# Patient Record
Sex: MALE | Race: OTHER | NOT HISPANIC OR LATINO | URBAN - METROPOLITAN AREA
[De-identification: names, ages, dates, MRNs, and addresses within clinical notes are randomized per-mention and may not be internally consistent; named-entity substitution may affect disease eponyms.]

---

## 2017-01-16 ENCOUNTER — INPATIENT (INPATIENT)
Facility: HOSPITAL | Age: 82
LOS: 3 days | Discharge: NURSING, REHAB OR LONG-TERM CARE | DRG: 193 | End: 2017-01-20
Attending: INTERNAL MEDICINE | Admitting: INTERNAL MEDICINE
Payer: MEDICARE

## 2017-01-16 VITALS
TEMPERATURE: 98 F | RESPIRATION RATE: 22 BRPM | OXYGEN SATURATION: 90 % | HEART RATE: 57 BPM | SYSTOLIC BLOOD PRESSURE: 172 MMHG | DIASTOLIC BLOOD PRESSURE: 81 MMHG

## 2017-01-16 DIAGNOSIS — I50.9 HEART FAILURE, UNSPECIFIED: ICD-10-CM

## 2017-01-16 DIAGNOSIS — Z98.49 CATARACT EXTRACTION STATUS, UNSPECIFIED EYE: Chronic | ICD-10-CM

## 2017-01-16 LAB
ALBUMIN SERPL ELPH-MCNC: 3.7 G/DL — SIGNIFICANT CHANGE UP (ref 3.3–5)
ALP SERPL-CCNC: 77 U/L — SIGNIFICANT CHANGE UP (ref 40–120)
ALT FLD-CCNC: 15 U/L RC — SIGNIFICANT CHANGE UP (ref 10–45)
ANION GAP SERPL CALC-SCNC: 18 MMOL/L — HIGH (ref 5–17)
APPEARANCE UR: ABNORMAL
APTT BLD: 39.4 SEC — HIGH (ref 27.5–37.4)
AST SERPL-CCNC: 21 U/L — SIGNIFICANT CHANGE UP (ref 10–40)
BACTERIA # UR AUTO: ABNORMAL /HPF
BASE EXCESS BLDV CALC-SCNC: 2.2 MMOL/L — HIGH (ref -2–2)
BASOPHILS # BLD AUTO: 0 K/UL — SIGNIFICANT CHANGE UP (ref 0–0.2)
BASOPHILS NFR BLD AUTO: 0.2 % — SIGNIFICANT CHANGE UP (ref 0–2)
BILIRUB SERPL-MCNC: 1.9 MG/DL — HIGH (ref 0.2–1.2)
BILIRUB UR-MCNC: NEGATIVE — SIGNIFICANT CHANGE UP
BUN SERPL-MCNC: 24 MG/DL — HIGH (ref 7–23)
CA-I SERPL-SCNC: 1.23 MMOL/L — SIGNIFICANT CHANGE UP (ref 1.12–1.3)
CALCIUM SERPL-MCNC: 9.3 MG/DL — SIGNIFICANT CHANGE UP (ref 8.4–10.5)
CHLORIDE BLDV-SCNC: 104 MMOL/L — SIGNIFICANT CHANGE UP (ref 96–108)
CHLORIDE SERPL-SCNC: 103 MMOL/L — SIGNIFICANT CHANGE UP (ref 96–108)
CK MB CFR SERPL CALC: 1.9 NG/ML — SIGNIFICANT CHANGE UP (ref 0–6.7)
CO2 BLDV-SCNC: 27 MMOL/L — SIGNIFICANT CHANGE UP (ref 22–30)
CO2 SERPL-SCNC: 21 MMOL/L — LOW (ref 22–31)
COLOR SPEC: YELLOW — SIGNIFICANT CHANGE UP
CREAT SERPL-MCNC: 1.25 MG/DL — SIGNIFICANT CHANGE UP (ref 0.5–1.3)
DIFF PNL FLD: ABNORMAL
EOSINOPHIL # BLD AUTO: 0 K/UL — SIGNIFICANT CHANGE UP (ref 0–0.5)
EOSINOPHIL NFR BLD AUTO: 0.2 % — SIGNIFICANT CHANGE UP (ref 0–6)
EPI CELLS # UR: SIGNIFICANT CHANGE UP /HPF
GAS PNL BLDV: 140 MMOL/L — SIGNIFICANT CHANGE UP (ref 136–145)
GAS PNL BLDV: SIGNIFICANT CHANGE UP
GAS PNL BLDV: SIGNIFICANT CHANGE UP
GLUCOSE BLDV-MCNC: 221 MG/DL — HIGH (ref 70–99)
GLUCOSE SERPL-MCNC: 207 MG/DL — HIGH (ref 70–99)
GLUCOSE UR QL: 100
HCO3 BLDV-SCNC: 26 MMOL/L — SIGNIFICANT CHANGE UP (ref 21–29)
HCT VFR BLD CALC: 47.6 % — SIGNIFICANT CHANGE UP (ref 39–50)
HCT VFR BLDA CALC: 49 % — SIGNIFICANT CHANGE UP (ref 39–50)
HGB BLD CALC-MCNC: 16 G/DL — SIGNIFICANT CHANGE UP (ref 13–17)
HGB BLD-MCNC: 15.9 G/DL — SIGNIFICANT CHANGE UP (ref 13–17)
INR BLD: 2.56 RATIO — HIGH (ref 0.88–1.16)
KETONES UR-MCNC: ABNORMAL
LACTATE BLDV-MCNC: 2.2 MMOL/L — HIGH (ref 0.7–2)
LEUKOCYTE ESTERASE UR-ACNC: ABNORMAL
LYMPHOCYTES # BLD AUTO: 1 K/UL — SIGNIFICANT CHANGE UP (ref 1–3.3)
LYMPHOCYTES # BLD AUTO: 4.5 % — LOW (ref 13–44)
MCHC RBC-ENTMCNC: 31.9 PG — SIGNIFICANT CHANGE UP (ref 27–34)
MCHC RBC-ENTMCNC: 33.5 GM/DL — SIGNIFICANT CHANGE UP (ref 32–36)
MCV RBC AUTO: 95.3 FL — SIGNIFICANT CHANGE UP (ref 80–100)
MONOCYTES # BLD AUTO: 1.7 K/UL — HIGH (ref 0–0.9)
MONOCYTES NFR BLD AUTO: 8 % — SIGNIFICANT CHANGE UP (ref 2–14)
NEUTROPHILS # BLD AUTO: 18.9 K/UL — HIGH (ref 1.8–7.4)
NEUTROPHILS NFR BLD AUTO: 87.1 % — HIGH (ref 43–77)
NITRITE UR-MCNC: NEGATIVE — SIGNIFICANT CHANGE UP
NT-PROBNP SERPL-SCNC: 1117 PG/ML — HIGH (ref 0–300)
OTHER CELLS CSF MANUAL: 18 ML/DL — SIGNIFICANT CHANGE UP (ref 18–22)
PCO2 BLDV: 40 MMHG — SIGNIFICANT CHANGE UP (ref 35–50)
PH BLDV: 7.43 — SIGNIFICANT CHANGE UP (ref 7.35–7.45)
PH UR: 6 — SIGNIFICANT CHANGE UP (ref 4.8–8)
PLAT MORPH BLD: NORMAL — SIGNIFICANT CHANGE UP
PLATELET # BLD AUTO: 133 K/UL — LOW (ref 150–400)
PO2 BLDV: 46 MMHG — HIGH (ref 25–45)
POTASSIUM BLDV-SCNC: 3.5 MMOL/L — SIGNIFICANT CHANGE UP (ref 3.5–5)
POTASSIUM SERPL-MCNC: 3.8 MMOL/L — SIGNIFICANT CHANGE UP (ref 3.5–5.3)
POTASSIUM SERPL-SCNC: 3.8 MMOL/L — SIGNIFICANT CHANGE UP (ref 3.5–5.3)
PROT SERPL-MCNC: 7 G/DL — SIGNIFICANT CHANGE UP (ref 6–8.3)
PROT UR-MCNC: 100 MG/DL
PROTHROM AB SERPL-ACNC: 28.2 SEC — HIGH (ref 10–13.1)
RAPID RVP RESULT: SIGNIFICANT CHANGE UP
RBC # BLD: 4.99 M/UL — SIGNIFICANT CHANGE UP (ref 4.2–5.8)
RBC # FLD: 12.5 % — SIGNIFICANT CHANGE UP (ref 10.3–14.5)
RBC BLD AUTO: SIGNIFICANT CHANGE UP
RBC CASTS # UR COMP ASSIST: ABNORMAL /HPF (ref 0–2)
SAO2 % BLDV: 81 % — SIGNIFICANT CHANGE UP (ref 67–88)
SODIUM SERPL-SCNC: 142 MMOL/L — SIGNIFICANT CHANGE UP (ref 135–145)
SP GR SPEC: 1.02 — SIGNIFICANT CHANGE UP (ref 1.01–1.02)
TROPONIN T SERPL-MCNC: <0.01 NG/ML — SIGNIFICANT CHANGE UP (ref 0–0.06)
UROBILINOGEN FLD QL: NEGATIVE — SIGNIFICANT CHANGE UP
WBC # BLD: 21.6 K/UL — HIGH (ref 3.8–10.5)
WBC # FLD AUTO: 21.6 K/UL — HIGH (ref 3.8–10.5)
WBC UR QL: ABNORMAL /HPF (ref 0–5)

## 2017-01-16 PROCEDURE — 99285 EMERGENCY DEPT VISIT HI MDM: CPT | Mod: 25

## 2017-01-16 PROCEDURE — 71010: CPT | Mod: 26

## 2017-01-16 PROCEDURE — 93010 ELECTROCARDIOGRAM REPORT: CPT

## 2017-01-16 RX ORDER — ASPIRIN/CALCIUM CARB/MAGNESIUM 324 MG
81 TABLET ORAL DAILY
Qty: 0 | Refills: 0 | Status: DISCONTINUED | OUTPATIENT
Start: 2017-01-16 | End: 2017-01-20

## 2017-01-16 RX ORDER — TAMSULOSIN HYDROCHLORIDE 0.4 MG/1
0.4 CAPSULE ORAL AT BEDTIME
Qty: 0 | Refills: 0 | Status: DISCONTINUED | OUTPATIENT
Start: 2017-01-16 | End: 2017-01-20

## 2017-01-16 RX ORDER — WARFARIN SODIUM 2.5 MG/1
5 TABLET ORAL ONCE
Qty: 0 | Refills: 0 | Status: COMPLETED | OUTPATIENT
Start: 2017-01-16 | End: 2017-01-16

## 2017-01-16 RX ORDER — LOSARTAN POTASSIUM 100 MG/1
50 TABLET, FILM COATED ORAL DAILY
Qty: 0 | Refills: 0 | Status: DISCONTINUED | OUTPATIENT
Start: 2017-01-16 | End: 2017-01-20

## 2017-01-16 RX ORDER — ACETAMINOPHEN 500 MG
1000 TABLET ORAL ONCE
Qty: 0 | Refills: 0 | Status: COMPLETED | OUTPATIENT
Start: 2017-01-16 | End: 2017-01-16

## 2017-01-16 RX ORDER — ATORVASTATIN CALCIUM 80 MG/1
20 TABLET, FILM COATED ORAL AT BEDTIME
Qty: 0 | Refills: 0 | Status: DISCONTINUED | OUTPATIENT
Start: 2017-01-16 | End: 2017-01-20

## 2017-01-16 RX ORDER — IPRATROPIUM/ALBUTEROL SULFATE 18-103MCG
3 AEROSOL WITH ADAPTER (GRAM) INHALATION
Qty: 0 | Refills: 0 | Status: COMPLETED | OUTPATIENT
Start: 2017-01-16 | End: 2017-01-16

## 2017-01-16 RX ORDER — SODIUM CHLORIDE 9 MG/ML
3 INJECTION INTRAMUSCULAR; INTRAVENOUS; SUBCUTANEOUS ONCE
Qty: 0 | Refills: 0 | Status: COMPLETED | OUTPATIENT
Start: 2017-01-16 | End: 2017-01-16

## 2017-01-16 RX ORDER — FUROSEMIDE 40 MG
20 TABLET ORAL DAILY
Qty: 0 | Refills: 0 | Status: DISCONTINUED | OUTPATIENT
Start: 2017-01-16 | End: 2017-01-17

## 2017-01-16 RX ORDER — IPRATROPIUM/ALBUTEROL SULFATE 18-103MCG
3 AEROSOL WITH ADAPTER (GRAM) INHALATION EVERY 6 HOURS
Qty: 0 | Refills: 0 | Status: DISCONTINUED | OUTPATIENT
Start: 2017-01-16 | End: 2017-01-18

## 2017-01-16 RX ORDER — BUDESONIDE, MICRONIZED 100 %
0.5 POWDER (GRAM) MISCELLANEOUS
Qty: 0 | Refills: 0 | Status: DISCONTINUED | OUTPATIENT
Start: 2017-01-16 | End: 2017-01-20

## 2017-01-16 RX ORDER — CEFTRIAXONE 500 MG/1
1 INJECTION, POWDER, FOR SOLUTION INTRAMUSCULAR; INTRAVENOUS ONCE
Qty: 0 | Refills: 0 | Status: COMPLETED | OUTPATIENT
Start: 2017-01-16 | End: 2017-01-16

## 2017-01-16 RX ORDER — AZITHROMYCIN 500 MG/1
1000 TABLET, FILM COATED ORAL ONCE
Qty: 0 | Refills: 0 | Status: COMPLETED | OUTPATIENT
Start: 2017-01-16 | End: 2017-01-16

## 2017-01-16 RX ADMIN — AZITHROMYCIN 250 MILLIGRAM(S): 500 TABLET, FILM COATED ORAL at 19:16

## 2017-01-16 RX ADMIN — Medication 3 MILLILITER(S): at 17:23

## 2017-01-16 RX ADMIN — Medication 3 MILLILITER(S): at 17:15

## 2017-01-16 RX ADMIN — Medication 3 MILLILITER(S): at 23:32

## 2017-01-16 RX ADMIN — SODIUM CHLORIDE 3 MILLILITER(S): 9 INJECTION INTRAMUSCULAR; INTRAVENOUS; SUBCUTANEOUS at 16:51

## 2017-01-16 RX ADMIN — Medication 3 MILLILITER(S): at 17:20

## 2017-01-16 RX ADMIN — Medication 400 MILLIGRAM(S): at 18:16

## 2017-01-16 RX ADMIN — WARFARIN SODIUM 5 MILLIGRAM(S): 2.5 TABLET ORAL at 23:32

## 2017-01-16 RX ADMIN — Medication 125 MILLIGRAM(S): at 17:23

## 2017-01-16 RX ADMIN — CEFTRIAXONE 100 GRAM(S): 500 INJECTION, POWDER, FOR SOLUTION INTRAMUSCULAR; INTRAVENOUS at 17:23

## 2017-01-16 RX ADMIN — TAMSULOSIN HYDROCHLORIDE 0.4 MILLIGRAM(S): 0.4 CAPSULE ORAL at 23:32

## 2017-01-16 NOTE — H&P ADULT. - ASSESSMENT
81M pmhx of DVT, HF, Left le pain. x 1 week. pw today because he is no longer able to ambulate. Last night also began having shortness of breath. Pts family at bedside primary concern is SOB. Not responsive to inhaler at home. Has chills but denies fever. Pt has had sciatica in past but pain radiated down back of leg. Pt pain today radiates down front. Denies cp,

## 2017-01-16 NOTE — ED ADULT NURSE NOTE - OBJECTIVE STATEMENT
80 yo male hx COPD not on home O2, CVA, DVT's on coumadin c/o L upper leg pain and fever/chills, unable to ambulate secondary to pain, and not "himself" according to family. pain in the leg x 1 week, worsening progressively, pt was unable to get out of the car without extensive help after driving to and from [place] yesterday with family. on exam no deformity to the leg, states pain is circumferential around the leg. weakness in L leg at baseline secondary to CVA. family states pt seems more lethargic, less talkative and jovial than his baseline. here pt is hypoxic and tachycardic, O2 improves on 4L nasal cannula. very small edema to bilat lower extremities, family states legs look better than usual. CM applied, EKG performed. pt appears to have increased work of breathing, poor lung sounds/air movement to all lung fields. abd rounded, mildly firm, no pain. pt vomited once on arrival here, denies nausea now, no diarrhea. family states low grade fevers/chills at home. no cough, no CP. pt states he feels no more SOB than he does at baseline. evaluated by MD, pending imaging, family at the bedside.

## 2017-01-16 NOTE — H&P ADULT. - PROBLEM SELECTOR PROBLEM 3
Deep vein thrombosis (DVT) of proximal lower extremity, unspecified chronicity, unspecified laterality

## 2017-01-16 NOTE — H&P ADULT. - NEUROLOGICAL DETAILS
sensation intact/alert and oriented x 3/normal strength/responds to verbal commands/deep reflexes intact

## 2017-01-16 NOTE — ED PROVIDER NOTE - OBJECTIVE STATEMENT
Left le pain. x 1 week. pw today because he is no longer able to ambulate. Last night also began having shortness of breath. Left le pain. x 1 week. pw today because he is no longer able to ambulate. Last night also began having shortness of breath. Pts family at bedside primary concern is SOB. Not responsive to inhaler at home. 81M pmhx of DVT, HF, Left le pain. x 1 week. pw today because he is no longer able to ambulate. Last night also began having shortness of breath. Pts family at bedside primary concern is SOB. Not responsive to inhaler at home. Has chills but denies fever. Pt has had sciatica in past but pain radiated down back of leg. Pt pain today radiates down front. Denies cp,

## 2017-01-16 NOTE — ED PROVIDER NOTE - ATTENDING CONTRIBUTION TO CARE
82 yo with sob and left leg pain for the past 2 -3 days, states has chronic back issues but has no been havig pain along the front side of the left, assoc with sob, and slightly ams. for the past 2 days, no cough but family reports chills at home. no documented fever, no nvd, EXAM: slightly sob at rest, rales bilaterally, no evidence of cellulitis to the leg. PLAN: sepsis worke up, r.o dvt as well.

## 2017-01-16 NOTE — H&P ADULT. - NEGATIVE NEUROLOGICAL SYMPTOMS
no paresthesias/no vertigo/no headache/no loss of sensation/no generalized seizures/no facial palsy/no difficulty walking/no tremors/no hemiparesis/no confusion/no weakness

## 2017-01-16 NOTE — ED ADULT NURSE NOTE - PMH
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

## 2017-01-16 NOTE — H&P ADULT. - NEGATIVE MUSCULOSKELETAL SYMPTOMS
no muscle weakness/no stiffness/no arthralgia/no myalgia/no arthritis/no joint swelling/no arm pain L/no arm pain R/no muscle cramps/no neck pain

## 2017-01-17 DIAGNOSIS — J44.9 CHRONIC OBSTRUCTIVE PULMONARY DISEASE, UNSPECIFIED: ICD-10-CM

## 2017-01-17 DIAGNOSIS — I82.4Y9 ACUTE EMBOLISM AND THROMBOSIS OF UNSPECIFIED DEEP VEINS OF UNSPECIFIED PROXIMAL LOWER EXTREMITY: ICD-10-CM

## 2017-01-17 DIAGNOSIS — M54.42 LUMBAGO WITH SCIATICA, LEFT SIDE: ICD-10-CM

## 2017-01-17 DIAGNOSIS — I50.33 ACUTE ON CHRONIC DIASTOLIC (CONGESTIVE) HEART FAILURE: ICD-10-CM

## 2017-01-17 LAB
ANION GAP SERPL CALC-SCNC: 20 MMOL/L — HIGH (ref 5–17)
BUN SERPL-MCNC: 29 MG/DL — HIGH (ref 7–23)
CALCIUM SERPL-MCNC: 8.9 MG/DL — SIGNIFICANT CHANGE UP (ref 8.4–10.5)
CHLORIDE SERPL-SCNC: 99 MMOL/L — SIGNIFICANT CHANGE UP (ref 96–108)
CHOLEST SERPL-MCNC: 118 MG/DL — SIGNIFICANT CHANGE UP (ref 10–199)
CO2 SERPL-SCNC: 21 MMOL/L — LOW (ref 22–31)
CREAT SERPL-MCNC: 1.26 MG/DL — SIGNIFICANT CHANGE UP (ref 0.5–1.3)
FOLATE SERPL-MCNC: >20 NG/ML — SIGNIFICANT CHANGE UP (ref 4.8–24.2)
GLUCOSE SERPL-MCNC: 369 MG/DL — HIGH (ref 70–99)
HCT VFR BLD CALC: 43.7 % — SIGNIFICANT CHANGE UP (ref 39–50)
HDLC SERPL-MCNC: 45 MG/DL — SIGNIFICANT CHANGE UP (ref 40–125)
HGB BLD-MCNC: 14.8 G/DL — SIGNIFICANT CHANGE UP (ref 13–17)
INR BLD: 1.88 RATIO — HIGH (ref 0.88–1.16)
LIPID PNL WITH DIRECT LDL SERPL: 56 MG/DL — SIGNIFICANT CHANGE UP
MAGNESIUM SERPL-MCNC: 1.8 MG/DL — SIGNIFICANT CHANGE UP (ref 1.6–2.6)
MCHC RBC-ENTMCNC: 31.8 PG — SIGNIFICANT CHANGE UP (ref 27–34)
MCHC RBC-ENTMCNC: 33.9 GM/DL — SIGNIFICANT CHANGE UP (ref 32–36)
MCV RBC AUTO: 93.8 FL — SIGNIFICANT CHANGE UP (ref 80–100)
NT-PROBNP SERPL-SCNC: 961 PG/ML — HIGH (ref 0–300)
PHOSPHATE SERPL-MCNC: 3.1 MG/DL — SIGNIFICANT CHANGE UP (ref 2.5–4.5)
PLATELET # BLD AUTO: 124 K/UL — LOW (ref 150–400)
POTASSIUM SERPL-MCNC: 4 MMOL/L — SIGNIFICANT CHANGE UP (ref 3.5–5.3)
POTASSIUM SERPL-SCNC: 4 MMOL/L — SIGNIFICANT CHANGE UP (ref 3.5–5.3)
PROTHROM AB SERPL-ACNC: 21.4 SEC — HIGH (ref 10–13.1)
RBC # BLD: 4.66 M/UL — SIGNIFICANT CHANGE UP (ref 4.2–5.8)
RBC # FLD: 13.9 % — SIGNIFICANT CHANGE UP (ref 10.3–14.5)
SODIUM SERPL-SCNC: 140 MMOL/L — SIGNIFICANT CHANGE UP (ref 135–145)
TOTAL CHOLESTEROL/HDL RATIO MEASUREMENT: 2.6 RATIO — LOW (ref 3.4–9.6)
TRIGL SERPL-MCNC: 83 MG/DL — SIGNIFICANT CHANGE UP (ref 10–149)
TSH SERPL-MCNC: 0.9 UIU/ML — SIGNIFICANT CHANGE UP (ref 0.27–4.2)
VIT B12 SERPL-MCNC: 438 PG/ML — SIGNIFICANT CHANGE UP (ref 243–894)
WBC # BLD: 12.92 K/UL — HIGH (ref 3.8–10.5)
WBC # FLD AUTO: 12.92 K/UL — HIGH (ref 3.8–10.5)

## 2017-01-17 PROCEDURE — 72148 MRI LUMBAR SPINE W/O DYE: CPT | Mod: 26

## 2017-01-17 RX ORDER — INSULIN GLARGINE 100 [IU]/ML
42 INJECTION, SOLUTION SUBCUTANEOUS AT BEDTIME
Qty: 0 | Refills: 0 | Status: DISCONTINUED | OUTPATIENT
Start: 2017-01-17 | End: 2017-01-20

## 2017-01-17 RX ORDER — SODIUM CHLORIDE 9 MG/ML
1000 INJECTION, SOLUTION INTRAVENOUS
Qty: 0 | Refills: 0 | Status: DISCONTINUED | OUTPATIENT
Start: 2017-01-17 | End: 2017-01-20

## 2017-01-17 RX ORDER — DEXTROSE 50 % IN WATER 50 %
25 SYRINGE (ML) INTRAVENOUS ONCE
Qty: 0 | Refills: 0 | Status: DISCONTINUED | OUTPATIENT
Start: 2017-01-17 | End: 2017-01-20

## 2017-01-17 RX ORDER — AZITHROMYCIN 500 MG/1
500 TABLET, FILM COATED ORAL EVERY 24 HOURS
Qty: 0 | Refills: 0 | Status: DISCONTINUED | OUTPATIENT
Start: 2017-01-17 | End: 2017-01-19

## 2017-01-17 RX ORDER — INSULIN LISPRO 100/ML
VIAL (ML) SUBCUTANEOUS
Qty: 0 | Refills: 0 | Status: DISCONTINUED | OUTPATIENT
Start: 2017-01-17 | End: 2017-01-20

## 2017-01-17 RX ORDER — CEFTRIAXONE 500 MG/1
1 INJECTION, POWDER, FOR SOLUTION INTRAMUSCULAR; INTRAVENOUS EVERY 24 HOURS
Qty: 0 | Refills: 0 | Status: DISCONTINUED | OUTPATIENT
Start: 2017-01-17 | End: 2017-01-19

## 2017-01-17 RX ORDER — DEXTROSE 50 % IN WATER 50 %
12.5 SYRINGE (ML) INTRAVENOUS ONCE
Qty: 0 | Refills: 0 | Status: DISCONTINUED | OUTPATIENT
Start: 2017-01-17 | End: 2017-01-20

## 2017-01-17 RX ORDER — INSULIN LISPRO 100/ML
VIAL (ML) SUBCUTANEOUS AT BEDTIME
Qty: 0 | Refills: 0 | Status: DISCONTINUED | OUTPATIENT
Start: 2017-01-17 | End: 2017-01-20

## 2017-01-17 RX ORDER — GLUCAGON INJECTION, SOLUTION 0.5 MG/.1ML
1 INJECTION, SOLUTION SUBCUTANEOUS ONCE
Qty: 0 | Refills: 0 | Status: DISCONTINUED | OUTPATIENT
Start: 2017-01-17 | End: 2017-01-20

## 2017-01-17 RX ORDER — DEXTROSE 50 % IN WATER 50 %
1 SYRINGE (ML) INTRAVENOUS ONCE
Qty: 0 | Refills: 0 | Status: DISCONTINUED | OUTPATIENT
Start: 2017-01-17 | End: 2017-01-20

## 2017-01-17 RX ORDER — WARFARIN SODIUM 2.5 MG/1
5 TABLET ORAL ONCE
Qty: 0 | Refills: 0 | Status: COMPLETED | OUTPATIENT
Start: 2017-01-17 | End: 2017-01-17

## 2017-01-17 RX ORDER — FUROSEMIDE 40 MG
20 TABLET ORAL DAILY
Qty: 0 | Refills: 0 | Status: DISCONTINUED | OUTPATIENT
Start: 2017-01-17 | End: 2017-01-20

## 2017-01-17 RX ORDER — ACETAMINOPHEN 500 MG
650 TABLET ORAL EVERY 6 HOURS
Qty: 0 | Refills: 0 | Status: DISCONTINUED | OUTPATIENT
Start: 2017-01-17 | End: 2017-01-20

## 2017-01-17 RX ADMIN — Medication 2: at 16:58

## 2017-01-17 RX ADMIN — Medication 3 MILLILITER(S): at 20:53

## 2017-01-17 RX ADMIN — Medication 0.5 MILLIGRAM(S): at 18:55

## 2017-01-17 RX ADMIN — AZITHROMYCIN 250 MILLIGRAM(S): 500 TABLET, FILM COATED ORAL at 18:55

## 2017-01-17 RX ADMIN — TAMSULOSIN HYDROCHLORIDE 0.4 MILLIGRAM(S): 0.4 CAPSULE ORAL at 23:14

## 2017-01-17 RX ADMIN — INSULIN GLARGINE 42 UNIT(S): 100 INJECTION, SOLUTION SUBCUTANEOUS at 23:15

## 2017-01-17 RX ADMIN — WARFARIN SODIUM 5 MILLIGRAM(S): 2.5 TABLET ORAL at 23:14

## 2017-01-17 RX ADMIN — CEFTRIAXONE 100 GRAM(S): 500 INJECTION, POWDER, FOR SOLUTION INTRAMUSCULAR; INTRAVENOUS at 17:04

## 2017-01-17 RX ADMIN — Medication: at 13:51

## 2017-01-17 RX ADMIN — Medication 0.5 MILLIGRAM(S): at 05:36

## 2017-01-17 RX ADMIN — LOSARTAN POTASSIUM 50 MILLIGRAM(S): 100 TABLET, FILM COATED ORAL at 05:36

## 2017-01-17 RX ADMIN — Medication 20 MILLIGRAM(S): at 17:04

## 2017-01-17 RX ADMIN — Medication 20 MILLIGRAM(S): at 05:36

## 2017-01-17 RX ADMIN — Medication 81 MILLIGRAM(S): at 11:18

## 2017-01-17 RX ADMIN — ATORVASTATIN CALCIUM 20 MILLIGRAM(S): 80 TABLET, FILM COATED ORAL at 23:14

## 2017-01-17 RX ADMIN — Medication: at 10:10

## 2017-01-18 LAB
ANION GAP SERPL CALC-SCNC: 15 MMOL/L — SIGNIFICANT CHANGE UP (ref 5–17)
BUN SERPL-MCNC: 38 MG/DL — HIGH (ref 7–23)
CALCIUM SERPL-MCNC: 9.3 MG/DL — SIGNIFICANT CHANGE UP (ref 8.4–10.5)
CHLORIDE SERPL-SCNC: 102 MMOL/L — SIGNIFICANT CHANGE UP (ref 96–108)
CO2 SERPL-SCNC: 25 MMOL/L — SIGNIFICANT CHANGE UP (ref 22–31)
CREAT SERPL-MCNC: 1.28 MG/DL — SIGNIFICANT CHANGE UP (ref 0.5–1.3)
GLUCOSE SERPL-MCNC: 237 MG/DL — HIGH (ref 70–99)
HBA1C BLD-MCNC: 8.3 % — HIGH (ref 4–5.6)
HCT VFR BLD CALC: 42.9 % — SIGNIFICANT CHANGE UP (ref 39–50)
HGB BLD-MCNC: 14 G/DL — SIGNIFICANT CHANGE UP (ref 13–17)
INR BLD: 2.04 RATIO — HIGH (ref 0.88–1.16)
MAGNESIUM SERPL-MCNC: 2.1 MG/DL — SIGNIFICANT CHANGE UP (ref 1.6–2.6)
MCHC RBC-ENTMCNC: 31 PG — SIGNIFICANT CHANGE UP (ref 27–34)
MCHC RBC-ENTMCNC: 32.6 GM/DL — SIGNIFICANT CHANGE UP (ref 32–36)
MCV RBC AUTO: 94.9 FL — SIGNIFICANT CHANGE UP (ref 80–100)
PHOSPHATE SERPL-MCNC: 2.6 MG/DL — SIGNIFICANT CHANGE UP (ref 2.5–4.5)
PLATELET # BLD AUTO: 135 K/UL — LOW (ref 150–400)
POTASSIUM SERPL-MCNC: 3.9 MMOL/L — SIGNIFICANT CHANGE UP (ref 3.5–5.3)
POTASSIUM SERPL-SCNC: 3.9 MMOL/L — SIGNIFICANT CHANGE UP (ref 3.5–5.3)
PROTHROM AB SERPL-ACNC: 23.2 SEC — HIGH (ref 10–13.1)
RBC # BLD: 4.52 M/UL — SIGNIFICANT CHANGE UP (ref 4.2–5.8)
RBC # FLD: 13.9 % — SIGNIFICANT CHANGE UP (ref 10.3–14.5)
SODIUM SERPL-SCNC: 142 MMOL/L — SIGNIFICANT CHANGE UP (ref 135–145)
WBC # BLD: 18.41 K/UL — HIGH (ref 3.8–10.5)
WBC # FLD AUTO: 18.41 K/UL — HIGH (ref 3.8–10.5)

## 2017-01-18 PROCEDURE — 93306 TTE W/DOPPLER COMPLETE: CPT | Mod: 26

## 2017-01-18 PROCEDURE — 71250 CT THORAX DX C-: CPT | Mod: 26

## 2017-01-18 RX ORDER — WARFARIN SODIUM 2.5 MG/1
5 TABLET ORAL ONCE
Qty: 0 | Refills: 0 | Status: COMPLETED | OUTPATIENT
Start: 2017-01-18 | End: 2017-01-18

## 2017-01-18 RX ORDER — GABAPENTIN 400 MG/1
100 CAPSULE ORAL THREE TIMES A DAY
Qty: 0 | Refills: 0 | Status: DISCONTINUED | OUTPATIENT
Start: 2017-01-18 | End: 2017-01-19

## 2017-01-18 RX ORDER — IPRATROPIUM/ALBUTEROL SULFATE 18-103MCG
3 AEROSOL WITH ADAPTER (GRAM) INHALATION EVERY 6 HOURS
Qty: 0 | Refills: 0 | Status: DISCONTINUED | OUTPATIENT
Start: 2017-01-18 | End: 2017-01-20

## 2017-01-18 RX ADMIN — TAMSULOSIN HYDROCHLORIDE 0.4 MILLIGRAM(S): 0.4 CAPSULE ORAL at 21:33

## 2017-01-18 RX ADMIN — GABAPENTIN 100 MILLIGRAM(S): 400 CAPSULE ORAL at 13:03

## 2017-01-18 RX ADMIN — Medication 3 MILLILITER(S): at 18:00

## 2017-01-18 RX ADMIN — GABAPENTIN 100 MILLIGRAM(S): 400 CAPSULE ORAL at 21:32

## 2017-01-18 RX ADMIN — Medication 81 MILLIGRAM(S): at 11:11

## 2017-01-18 RX ADMIN — Medication 650 MILLIGRAM(S): at 18:46

## 2017-01-18 RX ADMIN — Medication 3 MILLILITER(S): at 11:53

## 2017-01-18 RX ADMIN — Medication 2: at 07:52

## 2017-01-18 RX ADMIN — Medication 0.5 MILLIGRAM(S): at 07:19

## 2017-01-18 RX ADMIN — Medication 1: at 17:59

## 2017-01-18 RX ADMIN — Medication 1: at 11:53

## 2017-01-18 RX ADMIN — Medication 650 MILLIGRAM(S): at 08:23

## 2017-01-18 RX ADMIN — ATORVASTATIN CALCIUM 20 MILLIGRAM(S): 80 TABLET, FILM COATED ORAL at 21:32

## 2017-01-18 RX ADMIN — LOSARTAN POTASSIUM 50 MILLIGRAM(S): 100 TABLET, FILM COATED ORAL at 05:59

## 2017-01-18 RX ADMIN — Medication 650 MILLIGRAM(S): at 10:31

## 2017-01-18 RX ADMIN — AZITHROMYCIN 250 MILLIGRAM(S): 500 TABLET, FILM COATED ORAL at 18:00

## 2017-01-18 RX ADMIN — Medication 20 MILLIGRAM(S): at 05:59

## 2017-01-18 RX ADMIN — WARFARIN SODIUM 5 MILLIGRAM(S): 2.5 TABLET ORAL at 21:32

## 2017-01-18 RX ADMIN — Medication 0.5 MILLIGRAM(S): at 17:59

## 2017-01-18 RX ADMIN — Medication 650 MILLIGRAM(S): at 19:31

## 2017-01-18 RX ADMIN — INSULIN GLARGINE 42 UNIT(S): 100 INJECTION, SOLUTION SUBCUTANEOUS at 21:32

## 2017-01-18 RX ADMIN — CEFTRIAXONE 100 GRAM(S): 500 INJECTION, POWDER, FOR SOLUTION INTRAMUSCULAR; INTRAVENOUS at 17:59

## 2017-01-18 RX ADMIN — Medication 3 MILLILITER(S): at 21:32

## 2017-01-18 NOTE — DISCHARGE NOTE ADULT - PATIENT PORTAL LINK FT
“You can access the [facility] Patient Portal, offered by [facility], by registering with the following website: [email or web address]”

## 2017-01-18 NOTE — PHYSICAL THERAPY INITIAL EVALUATION ADULT - ADDITIONAL COMMENTS
(cont from above);MRILumbarSpine:Evidence of disc material in the far lateral compartment on the L at 4/5 blending imperceptibly with the L L4 exiting nerve root. Clinical correlation for L L4 radiculopathy. Synovial cyst at 5/1 on the L that causes mass impression on the lateral aspect of the thecal sac and causes mass impression on the intracanal left L5 root. Clinical correlation for a left L5 radiculopathy. CXR (-) (cont from above);MRILumbarSpine:Evidence of disc material in the far lateral compartment on the L at 4/5 blending imperceptibly with the L L4 exiting nerve root. Clinical correlation for L L4 radiculopathy. Synovial cyst at 5/1 on the L that causes mass impression on the lateral aspect of the thecal sac and causes mass impression on the intracanal left L5 root. Clinical correlation for a left L5 radiculopathy. CXR (-)      Pt lives in an apartment with his wife, 19 steps to enter with handrail, no steps inside.Pt states prior to admission he used a RW to ambulate and needed assist at times for transfers/ambulation. Pt required assist for dressing from wife.

## 2017-01-18 NOTE — DISCHARGE NOTE ADULT - MEDICATION SUMMARY - MEDICATIONS TO CHANGE
I will SWITCH the dose or number of times a day I take the medications listed below when I get home from the hospital:    Lantus Solostar Pen 100 units/mL subcutaneous solution  -- 40 unit(s) subcutaneous once a day (at bedtime)

## 2017-01-18 NOTE — DISCHARGE NOTE ADULT - CARE PLAN
Principal Discharge DX:	Sciatica  Instructions for follow-up, activity and diet:	Take all your medications as prescribed by your physician.  If you develop worsening back pain, please contact your physician or go to your nearest emergency room.  Secondary Diagnosis:	Chronic obstructive pulmonary disease, unspecified COPD type  Instructions for follow-up, activity and diet:	Call your Health Care provider upon arrival home to make a follow up appointment within one week.  Take all inhalers as prescribed by your Health Care Provider.  Take steroids as prescribed by your Health Care Provider.  If your cough increases infrequency and severity and/or you have shortness of breath or increased shortness of breath call your Health Care Provider.  If you develop fever, chills, night sweats, malaise, and/or change in mental status call your Health care Provider.  Nutrition is very important.  Eat small frequent meals.  Increase your activity as tolerated.  Do not stay in bed all day  Pt. verbalized an understanding of all instructions.  Secondary Diagnosis:	Deep vein thrombosis (DVT)  Instructions for follow-up, activity and diet:	Continue Coumadin 5 mg po daily; please keep the INR between 2-3.  Take your "blood thinners" as prescribed.  Walking is encouraged, increase activity as tolerated.  If you develop new leg pain, swelling, and/or redness contact your healthcare provider.  If you develop new chest pain with difficulty breathing, a rapid heart rate and/or a feeling of passing out call emergency medical services 911.  Secondary Diagnosis:	Diabetes mellitus  Instructions for follow-up, activity and diet:	HgA1C this admission was 8.3.  Make sure you get your HgA1c checked every three months.  If you take oral diabetes medications, check your blood glucose two times a day.  If you take insulin, check your blood glucose before meals and at bedtime.  It's important not to skip any meals.  Keep a log of your blood glucose results and always take it with you to your doctor appointments.  Keep a list of your current medications including injectables and over the counter medications and bring this medication list with you to all your doctor appointments.  If you have not seen your ophthalmologist this year call for appointment.  Check your feet daily for redness, sores, or openings. Do not self treat. If no improvement in two days call your primary care physician for an appointment.  Low blood sugar (hypoglycemia) is a blood sugar below 70mg/dl. Check your blood sugar if you feel signs/symptoms of hypoglycemia. If your blood sugar is below 70 take 15 grams of carbohydrates (ex 4 oz of apple juice, 3-4 glucose tablets, or 4-6 oz of regular soda) wait 15 minutes and repeat blood sugar to make sure it comes up above 70.  If your blood sugar is above 70 and you are due for a meal, have a meal.  If you are not due for a meal have a snack.  This snack helps keeps your blood sugar at a safe range. Principal Discharge DX:	Sciatica  Goal:	To remain pain free  Instructions for follow-up, activity and diet:	Take all your medications as prescribed by your physician.  If you develop worsening back pain, please contact your physician or go to your nearest emergency room.  Secondary Diagnosis:	Chronic obstructive pulmonary disease, unspecified COPD type  Instructions for follow-up, activity and diet:	Call your Health Care provider upon arrival home to make a follow up appointment within one week.  Take all inhalers as prescribed by your Health Care Provider.  Take steroids as prescribed by your Health Care Provider.  If your cough increases infrequency and severity and/or you have shortness of breath or increased shortness of breath call your Health Care Provider.  If you develop fever, chills, night sweats, malaise, and/or change in mental status call your Health care Provider.  Nutrition is very important.  Eat small frequent meals.  Increase your activity as tolerated.  Do not stay in bed all day  Pt. verbalized an understanding of all instructions.  Secondary Diagnosis:	Deep vein thrombosis (DVT)  Instructions for follow-up, activity and diet:	Continue Coumadin 5 mg po daily; please keep the INR between 2-3.  Take your "blood thinners" as prescribed.  Walking is encouraged, increase activity as tolerated.  If you develop new leg pain, swelling, and/or redness contact your healthcare provider.  If you develop new chest pain with difficulty breathing, a rapid heart rate and/or a feeling of passing out call emergency medical services 911.  Secondary Diagnosis:	Diabetes mellitus  Instructions for follow-up, activity and diet:	HgA1C this admission was 8.3.  Make sure you get your HgA1c checked every three months.  If you take oral diabetes medications, check your blood glucose two times a day.  If you take insulin, check your blood glucose before meals and at bedtime.  It's important not to skip any meals.  Keep a log of your blood glucose results and always take it with you to your doctor appointments.  Keep a list of your current medications including injectables and over the counter medications and bring this medication list with you to all your doctor appointments.  If you have not seen your ophthalmologist this year call for appointment.  Check your feet daily for redness, sores, or openings. Do not self treat. If no improvement in two days call your primary care physician for an appointment.  Low blood sugar (hypoglycemia) is a blood sugar below 70mg/dl. Check your blood sugar if you feel signs/symptoms of hypoglycemia. If your blood sugar is below 70 take 15 grams of carbohydrates (ex 4 oz of apple juice, 3-4 glucose tablets, or 4-6 oz of regular soda) wait 15 minutes and repeat blood sugar to make sure it comes up above 70.  If your blood sugar is above 70 and you are due for a meal, have a meal.  If you are not due for a meal have a snack.  This snack helps keeps your blood sugar at a safe range. Principal Discharge DX:	Sciatica  Goal:	To remain pain free.  Instructions for follow-up, activity and diet:	Take all your medications as prescribed by your physician.  If you develop worsening back pain, please contact your physician or go to your nearest emergency room.  Secondary Diagnosis:	Chronic obstructive pulmonary disease, unspecified COPD type  Instructions for follow-up, activity and diet:	Call your Health Care provider upon arrival home to make a follow up appointment within one week.  Take all inhalers as prescribed by your Health Care Provider.  Take steroids as prescribed by your Health Care Provider.  If your cough increases infrequency and severity and/or you have shortness of breath or increased shortness of breath call your Health Care Provider.  If you develop fever, chills, night sweats, malaise, and/or change in mental status call your Health care Provider.  Nutrition is very important.  Eat small frequent meals.  Increase your activity as tolerated.  Do not stay in bed all day  Pt. verbalized an understanding of all instructions.  Secondary Diagnosis:	Deep vein thrombosis (DVT)  Instructions for follow-up, activity and diet:	Continue Coumadin 5 mg po daily; please keep the INR between 2-3.  Take your "blood thinners" as prescribed.  Walking is encouraged, increase activity as tolerated.  If you develop new leg pain, swelling, and/or redness contact your healthcare provider.  If you develop new chest pain with difficulty breathing, a rapid heart rate and/or a feeling of passing out call emergency medical services 911.  Secondary Diagnosis:	Diabetes mellitus  Instructions for follow-up, activity and diet:	HgA1C this admission was 8.3.  Make sure you get your HgA1c checked every three months.  If you take oral diabetes medications, check your blood glucose two times a day.  If you take insulin, check your blood glucose before meals and at bedtime.  It's important not to skip any meals.  Keep a log of your blood glucose results and always take it with you to your doctor appointments.  Keep a list of your current medications including injectables and over the counter medications and bring this medication list with you to all your doctor appointments.  If you have not seen your ophthalmologist this year call for appointment.  Check your feet daily for redness, sores, or openings. Do not self treat. If no improvement in two days call your primary care physician for an appointment.  Low blood sugar (hypoglycemia) is a blood sugar below 70mg/dl. Check your blood sugar if you feel signs/symptoms of hypoglycemia. If your blood sugar is below 70 take 15 grams of carbohydrates (ex 4 oz of apple juice, 3-4 glucose tablets, or 4-6 oz of regular soda) wait 15 minutes and repeat blood sugar to make sure it comes up above 70.  If your blood sugar is above 70 and you are due for a meal, have a meal.  If you are not due for a meal have a snack.  This snack helps keeps your blood sugar at a safe range.

## 2017-01-18 NOTE — DISCHARGE NOTE ADULT - MEDICATION SUMMARY - MEDICATIONS TO TAKE
I will START or STAY ON the medications listed below when I get home from the hospital:    budesonide 0.5 mg/2 mL inhalation suspension  -- 0.5 milligram(s) inhaled 2 times a day  -- Indication: For COPD    methylPREDNISolone  -- 4 milligram(s) by mouth  before breakfast x 4 more doses  4 mg by mouth after lunch x 2 more doses  4 mg by mouth after dinner x 2 more doses  8 mg by mouth at bedtime x 1 more dose    Starting 1/21  4 mg by mouth at bedtime x 3 doses  -- Indication: For Sciatica pain    aspirin 81 mg oral tablet, chewable  -- 1 tab(s) by mouth once a day  -- Indication: For Heart    acetaminophen 325 mg oral tablet  -- 2 tab(s) by mouth every 6 hours, As needed, Moderate Pain (4 - 6)  -- Indication: For As needed for pain management    oxyCODONE 5 mg oral tablet  -- 1 tab(s) by mouth every 4 hours, As needed, Severe Pain (7 - 10)  -- Indication: For As needed for pain management    losartan 50 mg oral tablet  -- 1 tab(s) by mouth once a day  -- Indication: For High blood pressure    tamsulosin 0.4 mg oral capsule  -- 1 cap(s) by mouth once a day (at bedtime)  -- Indication: For BPH    Coumadin 5 mg oral tablet  -- 1 tab(s) by mouth once a day  -- Indication: For DVT    gabapentin 100 mg oral capsule  -- 2 cap(s) by mouth 3 times a day  -- Indication: For Neuropathy    metformin 500 mg oral tablet  -- 1 tab(s) by mouth once a day  -- Indication: For Diabetes mellitus    glyBURIDE 5 mg oral tablet  -- 1 tab(s) by mouth once a day  -- Indication: For Diabetes mellitus    Lantus Solostar Pen 100 units/mL subcutaneous solution  -- 42 unit(s) subcutaneous once a day (at bedtime)  -- Indication: For Diabetes mellitus    atorvastatin 20 mg oral tablet  -- 1 tab(s) by mouth once a day (at bedtime)  -- Indication: For High cholesterol    albuterol-ipratropium 2.5 mg-0.5 mg/3 mL inhalation solution  -- 3 milliliter(s) inhaled every 6 hours  -- Indication: For COPD    lidocaine 5% topical film  -- Apply on skin to affected area once a day  -- Indication: For Pain management    furosemide 20 mg oral tablet  -- 1 tab(s) by mouth once a day  -- Indication: For CHF    Fish Oil 1200 mg oral capsule  -- 1 cap(s) by mouth once a day  -- Indication: For Supplement    omeprazole 40 mg oral delayed release capsule  -- 1 cap(s) by mouth once a day  -- Indication: For GERD    Centrum Silver oral tablet  -- 1 tab(s) by mouth once a day  -- Indication: For Supplement

## 2017-01-18 NOTE — PHYSICAL THERAPY INITIAL EVALUATION ADULT - GENERAL OBSERVATIONS, REHAB EVAL
Pt received semi-supine in bed +tele monitor, IV lock, 2L O2 NC in NAD, agreeable to PT initial evaluation

## 2017-01-18 NOTE — DISCHARGE NOTE ADULT - CARE PROVIDER_API CALL
Codie Feng), [facility] Medicine; Internal Medicine  [address] [address]  Phone: [phone]    Sarabjit Wright), Internal Medicine; Pulmonary Disease  [address] [place] [address]  Phone: [phone]  Fax: [phone]

## 2017-01-18 NOTE — PHYSICAL THERAPY INITIAL EVALUATION ADULT - PERTINENT HX OF CURRENT PROBLEM, REHAB EVAL
81M Left le pain x 1wk, no longer able to ambulate a/w SOB. Pts family at bedside primary concern is SOB. Not responsive to inhaler at home. Has chills but denies fever. Pt has had sciatica in past but pain radiated down back of leg. Pt pain now radiates down front. Denies cp.(cont below)

## 2017-01-18 NOTE — DISCHARGE NOTE ADULT - PLAN OF CARE
Take all your medications as prescribed by your physician.  If you develop worsening back pain, please contact your physician or go to your nearest emergency room. Call your Health Care provider upon arrival home to make a follow up appointment within one week.  Take all inhalers as prescribed by your Health Care Provider.  Take steroids as prescribed by your Health Care Provider.  If your cough increases infrequency and severity and/or you have shortness of breath or increased shortness of breath call your Health Care Provider.  If you develop fever, chills, night sweats, malaise, and/or change in mental status call your Health care Provider.  Nutrition is very important.  Eat small frequent meals.  Increase your activity as tolerated.  Do not stay in bed all day  Pt. verbalized an understanding of all instructions. Continue Coumadin 5 mg po daily; please keep the INR between 2-3.  Take your "blood thinners" as prescribed.  Walking is encouraged, increase activity as tolerated.  If you develop new leg pain, swelling, and/or redness contact your healthcare provider.  If you develop new chest pain with difficulty breathing, a rapid heart rate and/or a feeling of passing out call emergency medical services 911. HgA1C this admission was 8.3.  Make sure you get your HgA1c checked every three months.  If you take oral diabetes medications, check your blood glucose two times a day.  If you take insulin, check your blood glucose before meals and at bedtime.  It's important not to skip any meals.  Keep a log of your blood glucose results and always take it with you to your doctor appointments.  Keep a list of your current medications including injectables and over the counter medications and bring this medication list with you to all your doctor appointments.  If you have not seen your ophthalmologist this year call for appointment.  Check your feet daily for redness, sores, or openings. Do not self treat. If no improvement in two days call your primary care physician for an appointment.  Low blood sugar (hypoglycemia) is a blood sugar below 70mg/dl. Check your blood sugar if you feel signs/symptoms of hypoglycemia. If your blood sugar is below 70 take 15 grams of carbohydrates (ex 4 oz of apple juice, 3-4 glucose tablets, or 4-6 oz of regular soda) wait 15 minutes and repeat blood sugar to make sure it comes up above 70.  If your blood sugar is above 70 and you are due for a meal, have a meal.  If you are not due for a meal have a snack.  This snack helps keeps your blood sugar at a safe range. To remain pain free To remain pain free.

## 2017-01-18 NOTE — DISCHARGE NOTE ADULT - HOSPITAL COURSE
attending to complete 81M pmhx of DVT, HF, Left le pain. x 1 week. pw today because he is no longer able to ambulate. Last night also began having shortness of breath. Pts family at bedside primary concern is SOB. Not responsive to inhaler at home. Has chills but denies fever. Pt has had sciatica in past but pain radiated down back of leg. Pt pain today radiates down front.     Dx: SOB        Weakness  1/18 chest CT: Centrilobular emphysema with a cluster groundglass nodules in the right lower lobe, some which appear high density may related to prior aspiration. 1 cm left lower lobe cyst with wall thickening is similar to 2014. 3 mm right lower lobe nodule. Recommend follow-up CT in 6 months for complete evaluation.  1/19; pt. now agrees to ALEXANDRA CAMEJO aware. Coumadin ordered

## 2017-01-18 NOTE — DISCHARGE NOTE ADULT - MEDICATION SUMMARY - MEDICATIONS TO STOP TAKING
I will STOP taking the medications listed below when I get home from the hospital:    Benicar 40 mg oral tablet  -- 1 tab(s) by mouth once a day    Crestor 5 mg oral tablet  -- 1 tab(s) by mouth once a day (at bedtime)

## 2017-01-18 NOTE — DISCHARGE NOTE ADULT - SECONDARY DIAGNOSIS.
Chronic obstructive pulmonary disease, unspecified COPD type Deep vein thrombosis (DVT) Diabetes mellitus

## 2017-01-18 NOTE — DISCHARGE NOTE ADULT - HOME CARE AGENCY
A nurse will visit you in your home from [facility].  They will contact you to confirm their visit.  Call [phone] or [phone] with any questions.

## 2017-01-18 NOTE — PHYSICAL THERAPY INITIAL EVALUATION ADULT - BED MOBILITY LIMITATIONS, REHAB EVAL
decreased ability to use arms for pushing/pulling/impaired ability to control trunk for mobility/decreased ability to use legs for bridging/pushing

## 2017-01-18 NOTE — DISCHARGE NOTE ADULT - NS AS DC VTE INSTRUCTION
Coumadin/Warfarin - Potential for adverse drug reactions and interactions/Coumadin/Warfarin - Compliance.../Coumadin/Warfarin - Dietary Advice.../Coumadin/Warfarin - Follow-up monitoring...

## 2017-01-19 LAB
ANION GAP SERPL CALC-SCNC: 14 MMOL/L — SIGNIFICANT CHANGE UP (ref 5–17)
BUN SERPL-MCNC: 32 MG/DL — HIGH (ref 7–23)
CALCIUM SERPL-MCNC: 9.3 MG/DL — SIGNIFICANT CHANGE UP (ref 8.4–10.5)
CHLORIDE SERPL-SCNC: 103 MMOL/L — SIGNIFICANT CHANGE UP (ref 96–108)
CO2 SERPL-SCNC: 27 MMOL/L — SIGNIFICANT CHANGE UP (ref 22–31)
CREAT SERPL-MCNC: 1.07 MG/DL — SIGNIFICANT CHANGE UP (ref 0.5–1.3)
GLUCOSE SERPL-MCNC: 145 MG/DL — HIGH (ref 70–99)
HCT VFR BLD CALC: 42.6 % — SIGNIFICANT CHANGE UP (ref 39–50)
HGB BLD-MCNC: 14.1 G/DL — SIGNIFICANT CHANGE UP (ref 13–17)
INR BLD: 2.65 RATIO — HIGH (ref 0.88–1.16)
MCHC RBC-ENTMCNC: 31.1 PG — SIGNIFICANT CHANGE UP (ref 27–34)
MCHC RBC-ENTMCNC: 33.1 GM/DL — SIGNIFICANT CHANGE UP (ref 32–36)
MCV RBC AUTO: 93.8 FL — SIGNIFICANT CHANGE UP (ref 80–100)
PLATELET # BLD AUTO: 149 K/UL — LOW (ref 150–400)
POTASSIUM SERPL-MCNC: 4 MMOL/L — SIGNIFICANT CHANGE UP (ref 3.5–5.3)
POTASSIUM SERPL-SCNC: 4 MMOL/L — SIGNIFICANT CHANGE UP (ref 3.5–5.3)
PROTHROM AB SERPL-ACNC: 30.2 SEC — HIGH (ref 10–13.1)
RBC # BLD: 4.54 M/UL — SIGNIFICANT CHANGE UP (ref 4.2–5.8)
RBC # FLD: 13.9 % — SIGNIFICANT CHANGE UP (ref 10.3–14.5)
SODIUM SERPL-SCNC: 144 MMOL/L — SIGNIFICANT CHANGE UP (ref 135–145)
WBC # BLD: 10.13 K/UL — SIGNIFICANT CHANGE UP (ref 3.8–10.5)
WBC # FLD AUTO: 10.13 K/UL — SIGNIFICANT CHANGE UP (ref 3.8–10.5)

## 2017-01-19 PROCEDURE — 94010 BREATHING CAPACITY TEST: CPT | Mod: 26

## 2017-01-19 RX ORDER — WARFARIN SODIUM 2.5 MG/1
5 TABLET ORAL ONCE
Qty: 0 | Refills: 0 | Status: COMPLETED | OUTPATIENT
Start: 2017-01-19 | End: 2017-01-19

## 2017-01-19 RX ORDER — GABAPENTIN 400 MG/1
200 CAPSULE ORAL THREE TIMES A DAY
Qty: 0 | Refills: 0 | Status: DISCONTINUED | OUTPATIENT
Start: 2017-01-19 | End: 2017-01-20

## 2017-01-19 RX ORDER — OXYCODONE HYDROCHLORIDE 5 MG/1
5 TABLET ORAL EVERY 4 HOURS
Qty: 0 | Refills: 0 | Status: DISCONTINUED | OUTPATIENT
Start: 2017-01-19 | End: 2017-01-20

## 2017-01-19 RX ORDER — LIDOCAINE 4 G/100G
1 CREAM TOPICAL DAILY
Qty: 0 | Refills: 0 | Status: DISCONTINUED | OUTPATIENT
Start: 2017-01-19 | End: 2017-01-20

## 2017-01-19 RX ADMIN — Medication 3 MILLILITER(S): at 06:26

## 2017-01-19 RX ADMIN — Medication 0.5 MILLIGRAM(S): at 18:42

## 2017-01-19 RX ADMIN — LOSARTAN POTASSIUM 50 MILLIGRAM(S): 100 TABLET, FILM COATED ORAL at 06:26

## 2017-01-19 RX ADMIN — GABAPENTIN 200 MILLIGRAM(S): 400 CAPSULE ORAL at 15:18

## 2017-01-19 RX ADMIN — GABAPENTIN 100 MILLIGRAM(S): 400 CAPSULE ORAL at 06:26

## 2017-01-19 RX ADMIN — ATORVASTATIN CALCIUM 20 MILLIGRAM(S): 80 TABLET, FILM COATED ORAL at 22:31

## 2017-01-19 RX ADMIN — LIDOCAINE 1 PATCH: 4 CREAM TOPICAL at 20:00

## 2017-01-19 RX ADMIN — Medication 20 MILLIGRAM(S): at 06:26

## 2017-01-19 RX ADMIN — Medication 0.5 MILLIGRAM(S): at 06:26

## 2017-01-19 RX ADMIN — INSULIN GLARGINE 42 UNIT(S): 100 INJECTION, SOLUTION SUBCUTANEOUS at 22:31

## 2017-01-19 RX ADMIN — Medication 1: at 18:42

## 2017-01-19 RX ADMIN — Medication 3 MILLILITER(S): at 23:07

## 2017-01-19 RX ADMIN — Medication 81 MILLIGRAM(S): at 12:47

## 2017-01-19 RX ADMIN — Medication 650 MILLIGRAM(S): at 09:40

## 2017-01-19 RX ADMIN — Medication 650 MILLIGRAM(S): at 09:04

## 2017-01-19 RX ADMIN — GABAPENTIN 200 MILLIGRAM(S): 400 CAPSULE ORAL at 22:38

## 2017-01-19 RX ADMIN — OXYCODONE HYDROCHLORIDE 5 MILLIGRAM(S): 5 TABLET ORAL at 23:12

## 2017-01-19 RX ADMIN — Medication 3 MILLILITER(S): at 12:47

## 2017-01-19 RX ADMIN — Medication 24 MILLIGRAM(S): at 22:32

## 2017-01-19 RX ADMIN — WARFARIN SODIUM 5 MILLIGRAM(S): 2.5 TABLET ORAL at 22:31

## 2017-01-19 RX ADMIN — Medication 2: at 12:47

## 2017-01-19 RX ADMIN — OXYCODONE HYDROCHLORIDE 5 MILLIGRAM(S): 5 TABLET ORAL at 18:43

## 2017-01-19 RX ADMIN — OXYCODONE HYDROCHLORIDE 5 MILLIGRAM(S): 5 TABLET ORAL at 19:20

## 2017-01-19 RX ADMIN — TAMSULOSIN HYDROCHLORIDE 0.4 MILLIGRAM(S): 0.4 CAPSULE ORAL at 22:31

## 2017-01-19 RX ADMIN — Medication 3 MILLILITER(S): at 18:42

## 2017-01-20 VITALS
SYSTOLIC BLOOD PRESSURE: 160 MMHG | HEART RATE: 91 BPM | OXYGEN SATURATION: 94 % | DIASTOLIC BLOOD PRESSURE: 80 MMHG | RESPIRATION RATE: 18 BRPM

## 2017-01-20 LAB
INR BLD: 2.38 RATIO — HIGH (ref 0.88–1.16)
PROTHROM AB SERPL-ACNC: 27.1 SEC — HIGH (ref 10–13.1)

## 2017-01-20 PROCEDURE — 83880 ASSAY OF NATRIURETIC PEPTIDE: CPT

## 2017-01-20 PROCEDURE — 82803 BLOOD GASES ANY COMBINATION: CPT

## 2017-01-20 PROCEDURE — 82947 ASSAY GLUCOSE BLOOD QUANT: CPT

## 2017-01-20 PROCEDURE — 84100 ASSAY OF PHOSPHORUS: CPT

## 2017-01-20 PROCEDURE — 83735 ASSAY OF MAGNESIUM: CPT

## 2017-01-20 PROCEDURE — 80053 COMPREHEN METABOLIC PANEL: CPT

## 2017-01-20 PROCEDURE — 97161 PT EVAL LOW COMPLEX 20 MIN: CPT

## 2017-01-20 PROCEDURE — 71045 X-RAY EXAM CHEST 1 VIEW: CPT

## 2017-01-20 PROCEDURE — 87486 CHLMYD PNEUM DNA AMP PROBE: CPT

## 2017-01-20 PROCEDURE — A9585: CPT

## 2017-01-20 PROCEDURE — 87633 RESP VIRUS 12-25 TARGETS: CPT

## 2017-01-20 PROCEDURE — 84484 ASSAY OF TROPONIN QUANT: CPT

## 2017-01-20 PROCEDURE — 87581 M.PNEUMON DNA AMP PROBE: CPT

## 2017-01-20 PROCEDURE — 83605 ASSAY OF LACTIC ACID: CPT

## 2017-01-20 PROCEDURE — 85610 PROTHROMBIN TIME: CPT

## 2017-01-20 PROCEDURE — 84295 ASSAY OF SERUM SODIUM: CPT

## 2017-01-20 PROCEDURE — 82330 ASSAY OF CALCIUM: CPT

## 2017-01-20 PROCEDURE — 84443 ASSAY THYROID STIM HORMONE: CPT

## 2017-01-20 PROCEDURE — 94640 AIRWAY INHALATION TREATMENT: CPT

## 2017-01-20 PROCEDURE — 93005 ELECTROCARDIOGRAM TRACING: CPT

## 2017-01-20 PROCEDURE — 96374 THER/PROPH/DIAG INJ IV PUSH: CPT

## 2017-01-20 PROCEDURE — 85730 THROMBOPLASTIN TIME PARTIAL: CPT

## 2017-01-20 PROCEDURE — 81001 URINALYSIS AUTO W/SCOPE: CPT

## 2017-01-20 PROCEDURE — 82746 ASSAY OF FOLIC ACID SERUM: CPT

## 2017-01-20 PROCEDURE — 82607 VITAMIN B-12: CPT

## 2017-01-20 PROCEDURE — 80061 LIPID PANEL: CPT

## 2017-01-20 PROCEDURE — 94010 BREATHING CAPACITY TEST: CPT

## 2017-01-20 PROCEDURE — 87798 DETECT AGENT NOS DNA AMP: CPT

## 2017-01-20 PROCEDURE — 80048 BASIC METABOLIC PNL TOTAL CA: CPT

## 2017-01-20 PROCEDURE — 85027 COMPLETE CBC AUTOMATED: CPT

## 2017-01-20 PROCEDURE — 93306 TTE W/DOPPLER COMPLETE: CPT

## 2017-01-20 PROCEDURE — 71250 CT THORAX DX C-: CPT

## 2017-01-20 PROCEDURE — 82553 CREATINE MB FRACTION: CPT

## 2017-01-20 PROCEDURE — 93971 EXTREMITY STUDY: CPT | Mod: 26

## 2017-01-20 PROCEDURE — 85014 HEMATOCRIT: CPT

## 2017-01-20 PROCEDURE — 87040 BLOOD CULTURE FOR BACTERIA: CPT

## 2017-01-20 PROCEDURE — 96375 TX/PRO/DX INJ NEW DRUG ADDON: CPT

## 2017-01-20 PROCEDURE — 72148 MRI LUMBAR SPINE W/O DYE: CPT

## 2017-01-20 PROCEDURE — 83036 HEMOGLOBIN GLYCOSYLATED A1C: CPT

## 2017-01-20 PROCEDURE — 82435 ASSAY OF BLOOD CHLORIDE: CPT

## 2017-01-20 PROCEDURE — 84132 ASSAY OF SERUM POTASSIUM: CPT

## 2017-01-20 PROCEDURE — 99285 EMERGENCY DEPT VISIT HI MDM: CPT | Mod: 25

## 2017-01-20 PROCEDURE — 93971 EXTREMITY STUDY: CPT

## 2017-01-20 RX ORDER — ACETAMINOPHEN 500 MG
2 TABLET ORAL
Qty: 0 | Refills: 0 | COMMUNITY
Start: 2017-01-20

## 2017-01-20 RX ORDER — LIDOCAINE 4 G/100G
1 CREAM TOPICAL
Qty: 0 | Refills: 0 | COMMUNITY
Start: 2017-01-20

## 2017-01-20 RX ORDER — LOSARTAN POTASSIUM 100 MG/1
50 TABLET, FILM COATED ORAL ONCE
Qty: 0 | Refills: 0 | Status: COMPLETED | OUTPATIENT
Start: 2017-01-20 | End: 2017-01-20

## 2017-01-20 RX ORDER — LOSARTAN POTASSIUM 100 MG/1
1 TABLET, FILM COATED ORAL
Qty: 0 | Refills: 0 | COMMUNITY
Start: 2017-01-20

## 2017-01-20 RX ORDER — ROSUVASTATIN CALCIUM 5 MG/1
1 TABLET ORAL
Qty: 0 | Refills: 0 | COMMUNITY

## 2017-01-20 RX ORDER — GABAPENTIN 400 MG/1
2 CAPSULE ORAL
Qty: 0 | Refills: 0 | DISCHARGE
Start: 2017-01-20

## 2017-01-20 RX ORDER — IPRATROPIUM/ALBUTEROL SULFATE 18-103MCG
3 AEROSOL WITH ADAPTER (GRAM) INHALATION
Qty: 0 | Refills: 0 | COMMUNITY

## 2017-01-20 RX ORDER — TAMSULOSIN HYDROCHLORIDE 0.4 MG/1
1 CAPSULE ORAL
Qty: 0 | Refills: 0 | COMMUNITY

## 2017-01-20 RX ORDER — MONTELUKAST 4 MG/1
1 TABLET, CHEWABLE ORAL
Qty: 0 | Refills: 0 | COMMUNITY

## 2017-01-20 RX ORDER — TAMSULOSIN HYDROCHLORIDE 0.4 MG/1
1 CAPSULE ORAL
Qty: 0 | Refills: 0 | COMMUNITY
Start: 2017-01-20

## 2017-01-20 RX ORDER — ACETAMINOPHEN 500 MG
2 TABLET ORAL
Qty: 0 | Refills: 0 | COMMUNITY

## 2017-01-20 RX ORDER — OXYCODONE HYDROCHLORIDE 5 MG/1
1 TABLET ORAL
Qty: 0 | Refills: 0 | COMMUNITY
Start: 2017-01-20

## 2017-01-20 RX ORDER — IPRATROPIUM/ALBUTEROL SULFATE 18-103MCG
3 AEROSOL WITH ADAPTER (GRAM) INHALATION
Qty: 0 | Refills: 0 | DISCHARGE
Start: 2017-01-20

## 2017-01-20 RX ORDER — ATORVASTATIN CALCIUM 80 MG/1
1 TABLET, FILM COATED ORAL
Qty: 0 | Refills: 0 | COMMUNITY
Start: 2017-01-20

## 2017-01-20 RX ADMIN — Medication 2: at 08:13

## 2017-01-20 RX ADMIN — GABAPENTIN 200 MILLIGRAM(S): 400 CAPSULE ORAL at 13:13

## 2017-01-20 RX ADMIN — LIDOCAINE 1 PATCH: 4 CREAM TOPICAL at 10:02

## 2017-01-20 RX ADMIN — Medication 4 MILLIGRAM(S): at 05:30

## 2017-01-20 RX ADMIN — Medication 650 MILLIGRAM(S): at 14:28

## 2017-01-20 RX ADMIN — Medication 81 MILLIGRAM(S): at 11:33

## 2017-01-20 RX ADMIN — GABAPENTIN 200 MILLIGRAM(S): 400 CAPSULE ORAL at 05:30

## 2017-01-20 RX ADMIN — Medication 650 MILLIGRAM(S): at 08:41

## 2017-01-20 RX ADMIN — OXYCODONE HYDROCHLORIDE 5 MILLIGRAM(S): 5 TABLET ORAL at 11:33

## 2017-01-20 RX ADMIN — OXYCODONE HYDROCHLORIDE 5 MILLIGRAM(S): 5 TABLET ORAL at 12:13

## 2017-01-20 RX ADMIN — LOSARTAN POTASSIUM 50 MILLIGRAM(S): 100 TABLET, FILM COATED ORAL at 14:31

## 2017-01-20 RX ADMIN — OXYCODONE HYDROCHLORIDE 5 MILLIGRAM(S): 5 TABLET ORAL at 00:12

## 2017-01-20 RX ADMIN — LOSARTAN POTASSIUM 50 MILLIGRAM(S): 100 TABLET, FILM COATED ORAL at 05:30

## 2017-01-20 RX ADMIN — Medication 0.5 MILLIGRAM(S): at 05:31

## 2017-01-20 RX ADMIN — Medication 4: at 11:44

## 2017-01-20 RX ADMIN — Medication 3 MILLILITER(S): at 05:31

## 2017-01-20 RX ADMIN — Medication 650 MILLIGRAM(S): at 08:13

## 2017-01-20 RX ADMIN — Medication 20 MILLIGRAM(S): at 05:31

## 2017-01-20 RX ADMIN — LIDOCAINE 1 PATCH: 4 CREAM TOPICAL at 11:23

## 2017-01-20 RX ADMIN — Medication 650 MILLIGRAM(S): at 15:08

## 2017-01-20 RX ADMIN — Medication 3 MILLILITER(S): at 11:23

## 2017-01-20 RX ADMIN — Medication 4 MILLIGRAM(S): at 13:14

## 2017-01-21 LAB
CULTURE RESULTS: SIGNIFICANT CHANGE UP
CULTURE RESULTS: SIGNIFICANT CHANGE UP
SPECIMEN SOURCE: SIGNIFICANT CHANGE UP
SPECIMEN SOURCE: SIGNIFICANT CHANGE UP

## 2017-03-09 ENCOUNTER — INPATIENT (INPATIENT)
Facility: HOSPITAL | Age: 82
LOS: 7 days | Discharge: NURSING, REHAB OR LONG-TERM CARE | DRG: 871 | End: 2017-03-17
Attending: INTERNAL MEDICINE | Admitting: INTERNAL MEDICINE
Payer: MEDICARE

## 2017-03-09 VITALS
RESPIRATION RATE: 20 BRPM | TEMPERATURE: 97 F | HEART RATE: 99 BPM | OXYGEN SATURATION: 94 % | SYSTOLIC BLOOD PRESSURE: 111 MMHG | DIASTOLIC BLOOD PRESSURE: 67 MMHG

## 2017-03-09 DIAGNOSIS — Z98.49 CATARACT EXTRACTION STATUS, UNSPECIFIED EYE: Chronic | ICD-10-CM

## 2017-03-09 LAB
APTT BLD: 49.1 SEC — HIGH (ref 27.5–37.4)
GAS PNL BLDV: SIGNIFICANT CHANGE UP
HCT VFR BLD CALC: 38.6 % — LOW (ref 39–50)
HGB BLD-MCNC: 12.9 G/DL — LOW (ref 13–17)
INR BLD: 3.64 RATIO — HIGH (ref 0.88–1.16)
MCHC RBC-ENTMCNC: 31.7 PG — SIGNIFICANT CHANGE UP (ref 27–34)
MCHC RBC-ENTMCNC: 33.4 GM/DL — SIGNIFICANT CHANGE UP (ref 32–36)
MCV RBC AUTO: 94.8 FL — SIGNIFICANT CHANGE UP (ref 80–100)
PLATELET # BLD AUTO: 185 K/UL — SIGNIFICANT CHANGE UP (ref 150–400)
PROTHROM AB SERPL-ACNC: 39.8 SEC — HIGH (ref 10–13.1)
RBC # BLD: 4.07 M/UL — LOW (ref 4.2–5.8)
RBC # FLD: 14.1 % — SIGNIFICANT CHANGE UP (ref 10.3–14.5)
WBC # BLD: 26.9 K/UL — HIGH (ref 3.8–10.5)
WBC # FLD AUTO: 26.9 K/UL — HIGH (ref 3.8–10.5)

## 2017-03-09 PROCEDURE — 93010 ELECTROCARDIOGRAM REPORT: CPT

## 2017-03-09 PROCEDURE — 99285 EMERGENCY DEPT VISIT HI MDM: CPT | Mod: 25

## 2017-03-09 RX ORDER — ONDANSETRON 8 MG/1
4 TABLET, FILM COATED ORAL ONCE
Qty: 0 | Refills: 0 | Status: DISCONTINUED | OUTPATIENT
Start: 2017-03-09 | End: 2017-03-09

## 2017-03-09 NOTE — ED PROVIDER NOTE - GASTROINTESTINAL, MLM
Abdomen soft, minimal tenderness in right upper quadrant without guarding. Obese. Small umbilical hernia that is non tender.

## 2017-03-09 NOTE — ED PROVIDER NOTE - MEDICAL DECISION MAKING DETAILS
MD Boo:  82 yo M, bib EMS after family noted him to be febrile and with less energy.  Tmax 102.  Recent admits for PNA/CHF.  Prolonged hospitalization/4wks in rehab.  Was re-treated with Abx at rehab for recurrent PNA.  Only been home for 12d.  Feels globally fatigued.  On warfarin for Hx of CVA.  VS:  HR 99, RR 20, 97.4, 92% on RA.  Physical Exam: adult M, fatigued-appearing, NAD, NCAT, PERRL, EOMI, Neck supple, CTA B, Abd S/ND/NT, trace lower extremity edema.  Impression:  possible recurrent PNA, given fever, fatigue, and borderline O2 sat.  Plan: cxr, labs, vbg, reassess.

## 2017-03-09 NOTE — ED PROVIDER NOTE - PROGRESS NOTE DETAILS
PMD = Dr. Savage.  Cardiologist = Dr. Waqar Patel. Jose Alejandro: CT abdomen/pelvis -findings c/w choledocholithiasis; vanc and zosyn given and will be admitted to medicine for GI eval for ERCP.

## 2017-03-09 NOTE — ED PROVIDER NOTE - OBJECTIVE STATEMENT
80 y/o male with PMH HTN, HLD, CAD on ASA, IDDM, CHF, COPD former smoker on lasix, and hx of DVT 17 years ago on Coumadin p/w vomiting. Per patient, has been vomiting all day. States any times he tries to eat or drink he vomits. Started this morning. Family reports an oral temp today of 101 - been taking tylenol. Denies any associated CP, SOB, abdominal pain, diarrhea, numbness, dysuria, hematuria. No hx of gastroparesis. No hx of abdominal surgeries. Last admitted in January w/ subsequent rehab for new onset CHF, pneumonia, and UTI.  PMD: Dr. Savage

## 2017-03-09 NOTE — ED ADULT NURSE NOTE - OBJECTIVE STATEMENT
82 y/o male presenting to ED for vomiting x today; patient denies any blood in vomit; family states patient was febrile to 100.0 today but afebrile upon arrival to ED; patient tachypneic with crackles throughout all lung fields; nonproductive wet cough noted; SPO2 88 placed on 2 liters of oxygen and SPO2 went to 95%; Patient has hx of CHF with recent admission for pneumonia as per family; no retractions noted; abdomen soft tender upon palpation in lower quadrants; patient denies chest pain, dizziness, nausea, chills; a&ox3; safety and comfort measures provided; family at bedside

## 2017-03-09 NOTE — ED PROVIDER NOTE - ATTENDING CONTRIBUTION TO CARE
MD Boo:  patient seen and evaluated with the resident.  I was present for key portions of the History & Physical, and I agree with the Impression & Plan.  MD Boo:  82 yo M, bib EMS after family noted him to be febrile and with less energy.  Tmax 102.  Recent admits for PNA/CHF.  Prolonged hospitalization/4wks in rehab.  Was re-treated with Abx at rehab for recurrent PNA.  Only been home for 12d.  Feels globally fatigued.  On warfarin for Hx of CVA.  VS:  HR 99, RR 20, 97.4, 92% on RA.  Physical Exam: adult M, fatigued-appearing, NAD, NCAT, PERRL, EOMI, Neck supple, CTA B, Abd S/ND/NT, trace lower extremity edema.  Impression:  possible recurrent PNA, given fever, fatigue, and borderline O2 sat.  Plan: cxr, labs, vbg, reassess.

## 2017-03-10 DIAGNOSIS — K80.50 CALCULUS OF BILE DUCT WITHOUT CHOLANGITIS OR CHOLECYSTITIS WITHOUT OBSTRUCTION: ICD-10-CM

## 2017-03-10 DIAGNOSIS — N17.9 ACUTE KIDNEY FAILURE, UNSPECIFIED: ICD-10-CM

## 2017-03-10 DIAGNOSIS — I10 ESSENTIAL (PRIMARY) HYPERTENSION: ICD-10-CM

## 2017-03-10 DIAGNOSIS — B34.9 VIRAL INFECTION, UNSPECIFIED: ICD-10-CM

## 2017-03-10 DIAGNOSIS — E11.9 TYPE 2 DIABETES MELLITUS WITHOUT COMPLICATIONS: ICD-10-CM

## 2017-03-10 DIAGNOSIS — R79.1 ABNORMAL COAGULATION PROFILE: ICD-10-CM

## 2017-03-10 DIAGNOSIS — A41.9 SEPSIS, UNSPECIFIED ORGANISM: ICD-10-CM

## 2017-03-10 DIAGNOSIS — Z29.9 ENCOUNTER FOR PROPHYLACTIC MEASURES, UNSPECIFIED: ICD-10-CM

## 2017-03-10 DIAGNOSIS — K80.31 CALCULUS OF BILE DUCT WITH CHOLANGITIS, UNSPECIFIED, WITH OBSTRUCTION: ICD-10-CM

## 2017-03-10 LAB
ALBUMIN SERPL ELPH-MCNC: 3.2 G/DL — LOW (ref 3.3–5)
ALBUMIN SERPL ELPH-MCNC: 3.3 G/DL — SIGNIFICANT CHANGE UP (ref 3.3–5)
ALBUMIN SERPL ELPH-MCNC: 3.4 G/DL — SIGNIFICANT CHANGE UP (ref 3.3–5)
ALP SERPL-CCNC: 330 U/L — HIGH (ref 40–120)
ALP SERPL-CCNC: 337 U/L — HIGH (ref 40–120)
ALP SERPL-CCNC: 353 U/L — HIGH (ref 40–120)
ALT FLD-CCNC: 201 U/L RC — HIGH (ref 10–45)
ALT FLD-CCNC: 206 U/L RC — HIGH (ref 10–45)
ALT FLD-CCNC: 235 U/L RC — HIGH (ref 10–45)
ANION GAP SERPL CALC-SCNC: 12 MMOL/L — SIGNIFICANT CHANGE UP (ref 5–17)
ANION GAP SERPL CALC-SCNC: 15 MMOL/L — SIGNIFICANT CHANGE UP (ref 5–17)
ANION GAP SERPL CALC-SCNC: 16 MMOL/L — SIGNIFICANT CHANGE UP (ref 5–17)
APPEARANCE UR: CLEAR — SIGNIFICANT CHANGE UP
APTT BLD: 46.4 SEC — HIGH (ref 27.5–37.4)
AST SERPL-CCNC: 188 U/L — HIGH (ref 10–40)
AST SERPL-CCNC: 193 U/L — HIGH (ref 10–40)
AST SERPL-CCNC: 317 U/L — HIGH (ref 10–40)
BACTERIA # UR AUTO: ABNORMAL /HPF
BASE EXCESS BLDV CALC-SCNC: 4 MMOL/L — HIGH (ref -2–2)
BASOPHILS # BLD AUTO: 0 K/UL — SIGNIFICANT CHANGE UP (ref 0–0.2)
BASOPHILS # BLD AUTO: 0.1 K/UL — SIGNIFICANT CHANGE UP (ref 0–0.2)
BASOPHILS NFR BLD AUTO: 0.4 % — SIGNIFICANT CHANGE UP (ref 0–2)
BILIRUB DIRECT SERPL-MCNC: 2.3 MG/DL — HIGH (ref 0–0.2)
BILIRUB INDIRECT FLD-MCNC: 1.6 MG/DL — HIGH (ref 0.2–1)
BILIRUB SERPL-MCNC: 3.1 MG/DL — HIGH (ref 0.2–1.2)
BILIRUB SERPL-MCNC: 3.9 MG/DL — HIGH (ref 0.2–1.2)
BILIRUB SERPL-MCNC: 4.1 MG/DL — HIGH (ref 0.2–1.2)
BILIRUB UR-MCNC: NEGATIVE — SIGNIFICANT CHANGE UP
BLD GP AB SCN SERPL QL: NEGATIVE — SIGNIFICANT CHANGE UP
BUN SERPL-MCNC: 20 MG/DL — SIGNIFICANT CHANGE UP (ref 7–23)
CA-I SERPL-SCNC: 1.16 MMOL/L — SIGNIFICANT CHANGE UP (ref 1.12–1.3)
CALCIUM SERPL-MCNC: 9.1 MG/DL — SIGNIFICANT CHANGE UP (ref 8.4–10.5)
CALCIUM SERPL-MCNC: 9.1 MG/DL — SIGNIFICANT CHANGE UP (ref 8.4–10.5)
CALCIUM SERPL-MCNC: 9.3 MG/DL — SIGNIFICANT CHANGE UP (ref 8.4–10.5)
CHLORIDE BLDV-SCNC: 107 MMOL/L — SIGNIFICANT CHANGE UP (ref 96–108)
CHLORIDE SERPL-SCNC: 104 MMOL/L — SIGNIFICANT CHANGE UP (ref 96–108)
CO2 BLDV-SCNC: 30 MMOL/L — SIGNIFICANT CHANGE UP (ref 22–30)
CO2 SERPL-SCNC: 24 MMOL/L — SIGNIFICANT CHANGE UP (ref 22–31)
CO2 SERPL-SCNC: 26 MMOL/L — SIGNIFICANT CHANGE UP (ref 22–31)
CO2 SERPL-SCNC: 28 MMOL/L — SIGNIFICANT CHANGE UP (ref 22–31)
COLOR SPEC: YELLOW — SIGNIFICANT CHANGE UP
CREAT SERPL-MCNC: 1.11 MG/DL — SIGNIFICANT CHANGE UP (ref 0.5–1.3)
CREAT SERPL-MCNC: 1.11 MG/DL — SIGNIFICANT CHANGE UP (ref 0.5–1.3)
CREAT SERPL-MCNC: 1.32 MG/DL — HIGH (ref 0.5–1.3)
DIFF PNL FLD: NEGATIVE — SIGNIFICANT CHANGE UP
EOSINOPHIL # BLD AUTO: 0 K/UL — SIGNIFICANT CHANGE UP (ref 0–0.5)
EOSINOPHIL # BLD AUTO: 0.2 K/UL — SIGNIFICANT CHANGE UP (ref 0–0.5)
EOSINOPHIL NFR BLD AUTO: 1 % — SIGNIFICANT CHANGE UP (ref 0–6)
GAS PNL BLDV: 138 MMOL/L — SIGNIFICANT CHANGE UP (ref 136–145)
GAS PNL BLDV: SIGNIFICANT CHANGE UP
GAS PNL BLDV: SIGNIFICANT CHANGE UP
GLUCOSE BLDV-MCNC: 197 MG/DL — HIGH (ref 70–99)
GLUCOSE SERPL-MCNC: 174 MG/DL — HIGH (ref 70–99)
GLUCOSE SERPL-MCNC: 98 MG/DL — SIGNIFICANT CHANGE UP (ref 70–99)
GLUCOSE SERPL-MCNC: 98 MG/DL — SIGNIFICANT CHANGE UP (ref 70–99)
GLUCOSE UR QL: NEGATIVE — SIGNIFICANT CHANGE UP
GRAM STN FLD: SIGNIFICANT CHANGE UP
HCO3 BLDV-SCNC: 29 MMOL/L — SIGNIFICANT CHANGE UP (ref 21–29)
HCOV OC43 RNA SPEC QL NAA+PROBE: DETECTED
HCT VFR BLD CALC: 40 % — SIGNIFICANT CHANGE UP (ref 39–50)
HCT VFR BLD CALC: 40.6 % — SIGNIFICANT CHANGE UP (ref 39–50)
HCT VFR BLDA CALC: 49 % — SIGNIFICANT CHANGE UP (ref 39–50)
HGB BLD CALC-MCNC: 15.9 G/DL — SIGNIFICANT CHANGE UP (ref 13–17)
HGB BLD-MCNC: 13.2 G/DL — SIGNIFICANT CHANGE UP (ref 13–17)
HGB BLD-MCNC: 13.3 G/DL — SIGNIFICANT CHANGE UP (ref 13–17)
HOROWITZ INDEX BLDV+IHG-RTO: SIGNIFICANT CHANGE UP
HYALINE CASTS # UR AUTO: ABNORMAL
INR BLD: 2.91 RATIO — HIGH (ref 0.88–1.16)
KETONES UR-MCNC: NEGATIVE — SIGNIFICANT CHANGE UP
LACTATE BLDV-MCNC: 1.7 MMOL/L — SIGNIFICANT CHANGE UP (ref 0.7–2)
LEUKOCYTE ESTERASE UR-ACNC: NEGATIVE — SIGNIFICANT CHANGE UP
LYMPHOCYTES # BLD AUTO: 0.8 K/UL — LOW (ref 1–3.3)
LYMPHOCYTES # BLD AUTO: 1.4 K/UL — SIGNIFICANT CHANGE UP (ref 1–3.3)
LYMPHOCYTES # BLD AUTO: 4 % — LOW (ref 13–44)
LYMPHOCYTES # BLD AUTO: 8.8 % — LOW (ref 13–44)
MAGNESIUM SERPL-MCNC: 1.7 MG/DL — SIGNIFICANT CHANGE UP (ref 1.6–2.6)
MCHC RBC-ENTMCNC: 31.4 PG — SIGNIFICANT CHANGE UP (ref 27–34)
MCHC RBC-ENTMCNC: 31.6 PG — SIGNIFICANT CHANGE UP (ref 27–34)
MCHC RBC-ENTMCNC: 32.6 GM/DL — SIGNIFICANT CHANGE UP (ref 32–36)
MCHC RBC-ENTMCNC: 33 GM/DL — SIGNIFICANT CHANGE UP (ref 32–36)
MCV RBC AUTO: 95.8 FL — SIGNIFICANT CHANGE UP (ref 80–100)
MCV RBC AUTO: 96.1 FL — SIGNIFICANT CHANGE UP (ref 80–100)
MONOCYTES # BLD AUTO: 0.8 K/UL — SIGNIFICANT CHANGE UP (ref 0–0.9)
MONOCYTES # BLD AUTO: 1.3 K/UL — HIGH (ref 0–0.9)
MONOCYTES NFR BLD AUTO: 4 % — SIGNIFICANT CHANGE UP (ref 2–14)
MONOCYTES NFR BLD AUTO: 4.8 % — SIGNIFICANT CHANGE UP (ref 2–14)
NEUTROPHILS # BLD AUTO: 13.5 K/UL — HIGH (ref 1.8–7.4)
NEUTROPHILS # BLD AUTO: 24.7 K/UL — HIGH (ref 1.8–7.4)
NEUTROPHILS NFR BLD AUTO: 85.1 % — HIGH (ref 43–77)
NEUTROPHILS NFR BLD AUTO: 86 % — HIGH (ref 43–77)
NEUTS BAND # BLD: 6 % — SIGNIFICANT CHANGE UP (ref 0–8)
NITRITE UR-MCNC: NEGATIVE — SIGNIFICANT CHANGE UP
NT-PROBNP SERPL-SCNC: 589 PG/ML — HIGH (ref 0–300)
OVALOCYTES BLD QL SMEAR: SLIGHT — SIGNIFICANT CHANGE UP
PCO2 BLDV: 44 MMHG — SIGNIFICANT CHANGE UP (ref 35–50)
PH BLDV: 7.42 — SIGNIFICANT CHANGE UP (ref 7.35–7.45)
PH UR: 6 — SIGNIFICANT CHANGE UP (ref 4.8–8)
PHOSPHATE SERPL-MCNC: 2.6 MG/DL — SIGNIFICANT CHANGE UP (ref 2.5–4.5)
PLAT MORPH BLD: NORMAL — SIGNIFICANT CHANGE UP
PLATELET # BLD AUTO: 174 K/UL — SIGNIFICANT CHANGE UP (ref 150–400)
PLATELET # BLD AUTO: 177 K/UL — SIGNIFICANT CHANGE UP (ref 150–400)
PO2 BLDV: 66 MMHG — HIGH (ref 25–45)
POLYCHROMASIA BLD QL SMEAR: SLIGHT — SIGNIFICANT CHANGE UP
POTASSIUM BLDV-SCNC: 3.6 MMOL/L — SIGNIFICANT CHANGE UP (ref 3.5–5)
POTASSIUM SERPL-MCNC: 3.6 MMOL/L — SIGNIFICANT CHANGE UP (ref 3.5–5.3)
POTASSIUM SERPL-MCNC: 3.8 MMOL/L — SIGNIFICANT CHANGE UP (ref 3.5–5.3)
POTASSIUM SERPL-MCNC: 3.9 MMOL/L — SIGNIFICANT CHANGE UP (ref 3.5–5.3)
POTASSIUM SERPL-SCNC: 3.6 MMOL/L — SIGNIFICANT CHANGE UP (ref 3.5–5.3)
POTASSIUM SERPL-SCNC: 3.8 MMOL/L — SIGNIFICANT CHANGE UP (ref 3.5–5.3)
POTASSIUM SERPL-SCNC: 3.9 MMOL/L — SIGNIFICANT CHANGE UP (ref 3.5–5.3)
PROT SERPL-MCNC: 6.4 G/DL — SIGNIFICANT CHANGE UP (ref 6–8.3)
PROT SERPL-MCNC: 6.4 G/DL — SIGNIFICANT CHANGE UP (ref 6–8.3)
PROT SERPL-MCNC: 6.5 G/DL — SIGNIFICANT CHANGE UP (ref 6–8.3)
PROT UR-MCNC: SIGNIFICANT CHANGE UP
PROTHROM AB SERPL-ACNC: 32.1 SEC — HIGH (ref 10–13.1)
RAPID RVP RESULT: DETECTED
RBC # BLD: 4.17 M/UL — LOW (ref 4.2–5.8)
RBC # BLD: 4.22 M/UL — SIGNIFICANT CHANGE UP (ref 4.2–5.8)
RBC # FLD: 14 % — SIGNIFICANT CHANGE UP (ref 10.3–14.5)
RBC # FLD: 14 % — SIGNIFICANT CHANGE UP (ref 10.3–14.5)
RBC BLD AUTO: SIGNIFICANT CHANGE UP
RBC CASTS # UR COMP ASSIST: SIGNIFICANT CHANGE UP /HPF (ref 0–2)
RH IG SCN BLD-IMP: POSITIVE — SIGNIFICANT CHANGE UP
SAO2 % BLDV: 92 % — HIGH (ref 67–88)
SODIUM SERPL-SCNC: 144 MMOL/L — SIGNIFICANT CHANGE UP (ref 135–145)
SODIUM SERPL-SCNC: 144 MMOL/L — SIGNIFICANT CHANGE UP (ref 135–145)
SODIUM SERPL-SCNC: 145 MMOL/L — SIGNIFICANT CHANGE UP (ref 135–145)
SP GR SPEC: 1.01 — SIGNIFICANT CHANGE UP (ref 1.01–1.02)
SPECIMEN SOURCE: SIGNIFICANT CHANGE UP
UROBILINOGEN FLD QL: NEGATIVE — SIGNIFICANT CHANGE UP
WBC # BLD: 15.9 K/UL — HIGH (ref 3.8–10.5)
WBC # BLD: 15.9 K/UL — HIGH (ref 3.8–10.5)
WBC # FLD AUTO: 15.9 K/UL — HIGH (ref 3.8–10.5)
WBC # FLD AUTO: 15.9 K/UL — HIGH (ref 3.8–10.5)
WBC UR QL: SIGNIFICANT CHANGE UP /HPF (ref 0–5)

## 2017-03-10 PROCEDURE — 74328 X-RAY BILE DUCT ENDOSCOPY: CPT | Mod: 26,GC

## 2017-03-10 PROCEDURE — 74177 CT ABD & PELVIS W/CONTRAST: CPT | Mod: 26

## 2017-03-10 PROCEDURE — 99223 1ST HOSP IP/OBS HIGH 75: CPT

## 2017-03-10 PROCEDURE — 99233 SBSQ HOSP IP/OBS HIGH 50: CPT

## 2017-03-10 PROCEDURE — 99222 1ST HOSP IP/OBS MODERATE 55: CPT

## 2017-03-10 PROCEDURE — 43274 ERCP DUCT STENT PLACEMENT: CPT | Mod: GC

## 2017-03-10 RX ORDER — DOCUSATE SODIUM 100 MG
100 CAPSULE ORAL THREE TIMES A DAY
Qty: 0 | Refills: 0 | Status: DISCONTINUED | OUTPATIENT
Start: 2017-03-10 | End: 2017-03-17

## 2017-03-10 RX ORDER — DEXTROSE 50 % IN WATER 50 %
25 SYRINGE (ML) INTRAVENOUS ONCE
Qty: 0 | Refills: 0 | Status: DISCONTINUED | OUTPATIENT
Start: 2017-03-10 | End: 2017-03-17

## 2017-03-10 RX ORDER — SENNA PLUS 8.6 MG/1
2 TABLET ORAL AT BEDTIME
Qty: 0 | Refills: 0 | Status: DISCONTINUED | OUTPATIENT
Start: 2017-03-10 | End: 2017-03-17

## 2017-03-10 RX ORDER — VANCOMYCIN HCL 1 G
1000 VIAL (EA) INTRAVENOUS ONCE
Qty: 0 | Refills: 0 | Status: COMPLETED | OUTPATIENT
Start: 2017-03-10 | End: 2017-03-10

## 2017-03-10 RX ORDER — GLUCAGON INJECTION, SOLUTION 0.5 MG/.1ML
1 INJECTION, SOLUTION SUBCUTANEOUS ONCE
Qty: 0 | Refills: 0 | Status: DISCONTINUED | OUTPATIENT
Start: 2017-03-10 | End: 2017-03-17

## 2017-03-10 RX ORDER — FUROSEMIDE 40 MG
20 TABLET ORAL DAILY
Qty: 0 | Refills: 0 | Status: DISCONTINUED | OUTPATIENT
Start: 2017-03-10 | End: 2017-03-10

## 2017-03-10 RX ORDER — GABAPENTIN 400 MG/1
200 CAPSULE ORAL THREE TIMES A DAY
Qty: 0 | Refills: 0 | Status: DISCONTINUED | OUTPATIENT
Start: 2017-03-10 | End: 2017-03-17

## 2017-03-10 RX ORDER — PIPERACILLIN AND TAZOBACTAM 4; .5 G/20ML; G/20ML
3.38 INJECTION, POWDER, LYOPHILIZED, FOR SOLUTION INTRAVENOUS EVERY 8 HOURS
Qty: 0 | Refills: 0 | Status: DISCONTINUED | OUTPATIENT
Start: 2017-03-10 | End: 2017-03-13

## 2017-03-10 RX ORDER — DEXTROSE 50 % IN WATER 50 %
1 SYRINGE (ML) INTRAVENOUS ONCE
Qty: 0 | Refills: 0 | Status: DISCONTINUED | OUTPATIENT
Start: 2017-03-10 | End: 2017-03-17

## 2017-03-10 RX ORDER — VANCOMYCIN HCL 1 G
1000 VIAL (EA) INTRAVENOUS EVERY 24 HOURS
Qty: 0 | Refills: 0 | Status: DISCONTINUED | OUTPATIENT
Start: 2017-03-10 | End: 2017-03-12

## 2017-03-10 RX ORDER — SODIUM CHLORIDE 9 MG/ML
1000 INJECTION, SOLUTION INTRAVENOUS
Qty: 0 | Refills: 0 | Status: DISCONTINUED | OUTPATIENT
Start: 2017-03-10 | End: 2017-03-17

## 2017-03-10 RX ORDER — ATORVASTATIN CALCIUM 80 MG/1
20 TABLET, FILM COATED ORAL AT BEDTIME
Qty: 0 | Refills: 0 | Status: DISCONTINUED | OUTPATIENT
Start: 2017-03-10 | End: 2017-03-17

## 2017-03-10 RX ORDER — PIPERACILLIN AND TAZOBACTAM 4; .5 G/20ML; G/20ML
3.38 INJECTION, POWDER, LYOPHILIZED, FOR SOLUTION INTRAVENOUS ONCE
Qty: 0 | Refills: 0 | Status: COMPLETED | OUTPATIENT
Start: 2017-03-10 | End: 2017-03-10

## 2017-03-10 RX ORDER — IPRATROPIUM/ALBUTEROL SULFATE 18-103MCG
3 AEROSOL WITH ADAPTER (GRAM) INHALATION EVERY 6 HOURS
Qty: 0 | Refills: 0 | Status: DISCONTINUED | OUTPATIENT
Start: 2017-03-10 | End: 2017-03-17

## 2017-03-10 RX ORDER — PHYTONADIONE (VIT K1) 5 MG
2.5 TABLET ORAL ONCE
Qty: 0 | Refills: 0 | Status: COMPLETED | OUTPATIENT
Start: 2017-03-10 | End: 2017-03-10

## 2017-03-10 RX ORDER — PANTOPRAZOLE SODIUM 20 MG/1
40 TABLET, DELAYED RELEASE ORAL
Qty: 0 | Refills: 0 | Status: DISCONTINUED | OUTPATIENT
Start: 2017-03-10 | End: 2017-03-17

## 2017-03-10 RX ORDER — ASPIRIN/CALCIUM CARB/MAGNESIUM 324 MG
81 TABLET ORAL DAILY
Qty: 0 | Refills: 0 | Status: DISCONTINUED | OUTPATIENT
Start: 2017-03-10 | End: 2017-03-17

## 2017-03-10 RX ORDER — SODIUM CHLORIDE 9 MG/ML
1000 INJECTION INTRAMUSCULAR; INTRAVENOUS; SUBCUTANEOUS
Qty: 0 | Refills: 0 | Status: DISCONTINUED | OUTPATIENT
Start: 2017-03-10 | End: 2017-03-10

## 2017-03-10 RX ORDER — ACETAMINOPHEN 500 MG
650 TABLET ORAL EVERY 6 HOURS
Qty: 0 | Refills: 0 | Status: DISCONTINUED | OUTPATIENT
Start: 2017-03-10 | End: 2017-03-17

## 2017-03-10 RX ORDER — ONDANSETRON 8 MG/1
4 TABLET, FILM COATED ORAL EVERY 6 HOURS
Qty: 0 | Refills: 0 | Status: DISCONTINUED | OUTPATIENT
Start: 2017-03-10 | End: 2017-03-17

## 2017-03-10 RX ORDER — TAMSULOSIN HYDROCHLORIDE 0.4 MG/1
0.4 CAPSULE ORAL AT BEDTIME
Qty: 0 | Refills: 0 | Status: DISCONTINUED | OUTPATIENT
Start: 2017-03-10 | End: 2017-03-17

## 2017-03-10 RX ORDER — LOSARTAN POTASSIUM 100 MG/1
50 TABLET, FILM COATED ORAL DAILY
Qty: 0 | Refills: 0 | Status: DISCONTINUED | OUTPATIENT
Start: 2017-03-10 | End: 2017-03-15

## 2017-03-10 RX ORDER — DEXTROSE 50 % IN WATER 50 %
12.5 SYRINGE (ML) INTRAVENOUS ONCE
Qty: 0 | Refills: 0 | Status: DISCONTINUED | OUTPATIENT
Start: 2017-03-10 | End: 2017-03-17

## 2017-03-10 RX ORDER — METOCLOPRAMIDE HCL 10 MG
10 TABLET ORAL EVERY 8 HOURS
Qty: 0 | Refills: 0 | Status: DISCONTINUED | OUTPATIENT
Start: 2017-03-10 | End: 2017-03-17

## 2017-03-10 RX ORDER — INSULIN LISPRO 100/ML
VIAL (ML) SUBCUTANEOUS AT BEDTIME
Qty: 0 | Refills: 0 | Status: DISCONTINUED | OUTPATIENT
Start: 2017-03-10 | End: 2017-03-17

## 2017-03-10 RX ORDER — INSULIN LISPRO 100/ML
VIAL (ML) SUBCUTANEOUS
Qty: 0 | Refills: 0 | Status: DISCONTINUED | OUTPATIENT
Start: 2017-03-10 | End: 2017-03-17

## 2017-03-10 RX ORDER — INSULIN GLARGINE 100 [IU]/ML
30 INJECTION, SOLUTION SUBCUTANEOUS AT BEDTIME
Qty: 0 | Refills: 0 | Status: DISCONTINUED | OUTPATIENT
Start: 2017-03-10 | End: 2017-03-17

## 2017-03-10 RX ORDER — BUDESONIDE, MICRONIZED 100 %
0.5 POWDER (GRAM) MISCELLANEOUS
Qty: 0 | Refills: 0 | Status: DISCONTINUED | OUTPATIENT
Start: 2017-03-10 | End: 2017-03-17

## 2017-03-10 RX ADMIN — Medication 2.5 MILLIGRAM(S): at 05:29

## 2017-03-10 RX ADMIN — LOSARTAN POTASSIUM 50 MILLIGRAM(S): 100 TABLET, FILM COATED ORAL at 08:39

## 2017-03-10 RX ADMIN — GABAPENTIN 200 MILLIGRAM(S): 400 CAPSULE ORAL at 22:40

## 2017-03-10 RX ADMIN — PIPERACILLIN AND TAZOBACTAM 25 GRAM(S): 4; .5 INJECTION, POWDER, LYOPHILIZED, FOR SOLUTION INTRAVENOUS at 08:39

## 2017-03-10 RX ADMIN — Medication 3 MILLILITER(S): at 13:45

## 2017-03-10 RX ADMIN — PANTOPRAZOLE SODIUM 40 MILLIGRAM(S): 20 TABLET, DELAYED RELEASE ORAL at 08:38

## 2017-03-10 RX ADMIN — Medication 0.5 MILLIGRAM(S): at 09:47

## 2017-03-10 RX ADMIN — ATORVASTATIN CALCIUM 20 MILLIGRAM(S): 80 TABLET, FILM COATED ORAL at 22:40

## 2017-03-10 RX ADMIN — Medication 0.5 MILLIGRAM(S): at 18:26

## 2017-03-10 RX ADMIN — PIPERACILLIN AND TAZOBACTAM 200 GRAM(S): 4; .5 INJECTION, POWDER, LYOPHILIZED, FOR SOLUTION INTRAVENOUS at 01:15

## 2017-03-10 RX ADMIN — GABAPENTIN 200 MILLIGRAM(S): 400 CAPSULE ORAL at 12:39

## 2017-03-10 RX ADMIN — TAMSULOSIN HYDROCHLORIDE 0.4 MILLIGRAM(S): 0.4 CAPSULE ORAL at 22:51

## 2017-03-10 RX ADMIN — INSULIN GLARGINE 30 UNIT(S): 100 INJECTION, SOLUTION SUBCUTANEOUS at 22:40

## 2017-03-10 RX ADMIN — Medication 81 MILLIGRAM(S): at 12:38

## 2017-03-10 RX ADMIN — Medication 250 MILLIGRAM(S): at 02:31

## 2017-03-10 RX ADMIN — GABAPENTIN 200 MILLIGRAM(S): 400 CAPSULE ORAL at 08:38

## 2017-03-10 RX ADMIN — PIPERACILLIN AND TAZOBACTAM 25 GRAM(S): 4; .5 INJECTION, POWDER, LYOPHILIZED, FOR SOLUTION INTRAVENOUS at 18:26

## 2017-03-10 NOTE — H&P ADULT. - PROBLEM SELECTOR PLAN 3
- likely in setting of dehydration 2/2 vomiting, decreased PO intake  - fluid hydration  - repeat BMP at this time

## 2017-03-10 NOTE — ED ADULT NURSE REASSESSMENT NOTE - NS ED NURSE REASSESS COMMENT FT1
Patient straight Catheterized for UA and UC under aseptic conditions; urine clear, yellow with no foul odor or sediment; patient off to CT scan; patient denies pain, SOB, difficulty breathing at this time; safety and comfort measures maintained
Per ED rep alberto second type and screen not requested
Patient appears to be resting comfortably in stretcher; awaiting transport upstairs; patient denies any pain at this time; patient denies difficulty breathing; safety and comfort measures maintained

## 2017-03-10 NOTE — H&P ADULT. - RS GEN PE MLT RESP DETAILS PC
respirations non-labored/good air movement/clear to auscultation bilaterally/normal/breath sounds equal/airway patent

## 2017-03-10 NOTE — H&P ADULT. - ASSESSMENT
82 y/o  M with PMHx of  HTN, HLD, CAD, T2DM, CHF (EF 40%), COPD , and hx of DVT 17 years ago on Coumadin p/w nausea and vomiting x 48 hours concerning for cholangitis with choledocholithiasis and coronovirus +

## 2017-03-10 NOTE — H&P ADULT. - PROBLEM SELECTOR PLAN 1
- c/w vanc/zosyn  - GI in am for ERCP  - surgery c/s, no role for surgical intervention at this time  - NPO  - symptomatic relief

## 2017-03-10 NOTE — H&P ADULT. - HISTORY OF PRESENT ILLNESS
82 y/o  M with PMHx of  HTN, HLD, CAD, T2DM, CHF (EF 40%), COPD , and hx of DVT 17 years ago on Coumadin p/w nausea and vomiting x 48 hours. Pt has never had an episode like this before, states it began right after breakfast and any times he tries to eat or drink he vomits. Endorses fevers and chills. Family reports an oral temp today of 101, but pt denies any associated CP, SOB, abdominal pain, diarrhea, dysuria, hematuria.      Pt recently discharged from rehab after being there post hospital stay for UTI.     At baseline, pt needs assistance from wife with daily ADLs, has a chair to go up and down stairs but slips off of it so does not feel comfortable using it.     In the ED:  Pt given Vanc/zosyn, pt afebrile  Vitamin K 2.5 given

## 2017-03-11 LAB
ALBUMIN SERPL ELPH-MCNC: 2.7 G/DL — LOW (ref 3.3–5)
ALP SERPL-CCNC: 284 U/L — HIGH (ref 40–120)
ALT FLD-CCNC: 146 U/L — HIGH (ref 10–45)
ANION GAP SERPL CALC-SCNC: 14 MMOL/L — SIGNIFICANT CHANGE UP (ref 5–17)
AST SERPL-CCNC: 104 U/L — HIGH (ref 10–40)
BILIRUB SERPL-MCNC: 4.3 MG/DL — HIGH (ref 0.2–1.2)
BUN SERPL-MCNC: 19 MG/DL — SIGNIFICANT CHANGE UP (ref 7–23)
CALCIUM SERPL-MCNC: 8.6 MG/DL — SIGNIFICANT CHANGE UP (ref 8.4–10.5)
CHLORIDE SERPL-SCNC: 103 MMOL/L — SIGNIFICANT CHANGE UP (ref 96–108)
CO2 SERPL-SCNC: 24 MMOL/L — SIGNIFICANT CHANGE UP (ref 22–31)
CREAT SERPL-MCNC: 0.98 MG/DL — SIGNIFICANT CHANGE UP (ref 0.5–1.3)
CULTURE RESULTS: NO GROWTH — SIGNIFICANT CHANGE UP
GLUCOSE SERPL-MCNC: 147 MG/DL — HIGH (ref 70–99)
HCT VFR BLD CALC: 36.2 % — LOW (ref 39–50)
HGB BLD-MCNC: 12 G/DL — LOW (ref 13–17)
INR BLD: 1.62 RATIO — HIGH (ref 0.88–1.16)
MCHC RBC-ENTMCNC: 31.3 PG — SIGNIFICANT CHANGE UP (ref 27–34)
MCHC RBC-ENTMCNC: 33.1 GM/DL — SIGNIFICANT CHANGE UP (ref 32–36)
MCV RBC AUTO: 94.5 FL — SIGNIFICANT CHANGE UP (ref 80–100)
PLATELET # BLD AUTO: 161 K/UL — SIGNIFICANT CHANGE UP (ref 150–400)
POTASSIUM SERPL-MCNC: 3.5 MMOL/L — SIGNIFICANT CHANGE UP (ref 3.5–5.3)
POTASSIUM SERPL-SCNC: 3.5 MMOL/L — SIGNIFICANT CHANGE UP (ref 3.5–5.3)
PROT SERPL-MCNC: 5.6 G/DL — LOW (ref 6–8.3)
PROTHROM AB SERPL-ACNC: 18.4 SEC — HIGH (ref 10–13.1)
RBC # BLD: 3.83 M/UL — LOW (ref 4.2–5.8)
RBC # FLD: 16.1 % — HIGH (ref 10.3–14.5)
SODIUM SERPL-SCNC: 142 MMOL/L — SIGNIFICANT CHANGE UP (ref 135–145)
SPECIMEN SOURCE: SIGNIFICANT CHANGE UP
WBC # BLD: 12.27 K/UL — HIGH (ref 3.8–10.5)
WBC # FLD AUTO: 12.27 K/UL — HIGH (ref 3.8–10.5)

## 2017-03-11 PROCEDURE — 99232 SBSQ HOSP IP/OBS MODERATE 35: CPT

## 2017-03-11 PROCEDURE — 99233 SBSQ HOSP IP/OBS HIGH 50: CPT

## 2017-03-11 PROCEDURE — 99232 SBSQ HOSP IP/OBS MODERATE 35: CPT | Mod: GC

## 2017-03-11 RX ORDER — FUROSEMIDE 40 MG
20 TABLET ORAL ONCE
Qty: 0 | Refills: 0 | Status: COMPLETED | OUTPATIENT
Start: 2017-03-11 | End: 2017-03-11

## 2017-03-11 RX ORDER — ENOXAPARIN SODIUM 100 MG/ML
100 INJECTION SUBCUTANEOUS EVERY 12 HOURS
Qty: 0 | Refills: 0 | Status: DISCONTINUED | OUTPATIENT
Start: 2017-03-11 | End: 2017-03-17

## 2017-03-11 RX ADMIN — PANTOPRAZOLE SODIUM 40 MILLIGRAM(S): 20 TABLET, DELAYED RELEASE ORAL at 06:21

## 2017-03-11 RX ADMIN — Medication 2: at 18:03

## 2017-03-11 RX ADMIN — INSULIN GLARGINE 30 UNIT(S): 100 INJECTION, SOLUTION SUBCUTANEOUS at 22:28

## 2017-03-11 RX ADMIN — Medication 81 MILLIGRAM(S): at 13:02

## 2017-03-11 RX ADMIN — Medication 3 MILLILITER(S): at 00:58

## 2017-03-11 RX ADMIN — GABAPENTIN 200 MILLIGRAM(S): 400 CAPSULE ORAL at 06:21

## 2017-03-11 RX ADMIN — PIPERACILLIN AND TAZOBACTAM 25 GRAM(S): 4; .5 INJECTION, POWDER, LYOPHILIZED, FOR SOLUTION INTRAVENOUS at 18:04

## 2017-03-11 RX ADMIN — Medication 250 MILLIGRAM(S): at 06:30

## 2017-03-11 RX ADMIN — ENOXAPARIN SODIUM 100 MILLIGRAM(S): 100 INJECTION SUBCUTANEOUS at 18:18

## 2017-03-11 RX ADMIN — Medication 3 MILLILITER(S): at 13:02

## 2017-03-11 RX ADMIN — PIPERACILLIN AND TAZOBACTAM 25 GRAM(S): 4; .5 INJECTION, POWDER, LYOPHILIZED, FOR SOLUTION INTRAVENOUS at 01:55

## 2017-03-11 RX ADMIN — LOSARTAN POTASSIUM 50 MILLIGRAM(S): 100 TABLET, FILM COATED ORAL at 06:21

## 2017-03-11 RX ADMIN — Medication 0.5 MILLIGRAM(S): at 18:05

## 2017-03-11 RX ADMIN — ATORVASTATIN CALCIUM 20 MILLIGRAM(S): 80 TABLET, FILM COATED ORAL at 22:24

## 2017-03-11 RX ADMIN — Medication 0.5 MILLIGRAM(S): at 06:21

## 2017-03-11 RX ADMIN — GABAPENTIN 200 MILLIGRAM(S): 400 CAPSULE ORAL at 13:01

## 2017-03-11 RX ADMIN — Medication 3 MILLILITER(S): at 23:23

## 2017-03-11 RX ADMIN — GABAPENTIN 200 MILLIGRAM(S): 400 CAPSULE ORAL at 22:24

## 2017-03-11 RX ADMIN — PIPERACILLIN AND TAZOBACTAM 25 GRAM(S): 4; .5 INJECTION, POWDER, LYOPHILIZED, FOR SOLUTION INTRAVENOUS at 08:26

## 2017-03-11 RX ADMIN — TAMSULOSIN HYDROCHLORIDE 0.4 MILLIGRAM(S): 0.4 CAPSULE ORAL at 22:24

## 2017-03-11 RX ADMIN — Medication 3: at 13:00

## 2017-03-11 RX ADMIN — Medication 3 MILLILITER(S): at 06:20

## 2017-03-11 RX ADMIN — Medication 20 MILLIGRAM(S): at 18:08

## 2017-03-11 RX ADMIN — Medication 3 MILLILITER(S): at 18:05

## 2017-03-11 NOTE — PHYSICAL THERAPY INITIAL EVALUATION ADULT - RANGE OF MOTION EXAMINATION, REHAB EVAL
Right LE ROM was WFL (within functional limits)/LLE limited to grossly 3/4 available ROM/bilateral upper extremity ROM was WFL (within functional limits)/deficits as listed below

## 2017-03-11 NOTE — PHYSICAL THERAPY INITIAL EVALUATION ADULT - ASR WT BEARING STATUS EVAL
no weight-bearing restrictions no weight-bearing restrictions/CT abdomen: Cholelithiasis. Dilated common bile duct with small densities in the distal common bile duct, likely representing choledocholithiasis. This can be further characterized on a follow-up MRCP/MRI. Nonspecific, mild prominence of the common bile duct wall. Clinical correlation is recommended to assess superimposed cholangitis.

## 2017-03-11 NOTE — PHYSICAL THERAPY INITIAL EVALUATION ADULT - ADDITIONAL COMMENTS
This is an 81 year old male who was recently DC from a short term rehab. Pt lives in a private house with his wife, house has 3 steps to negotiate + hand rail , Pt admits that he is able to independently ambulate with a walker, however requires assistance with IADL's more independent with BADL's. This is an 81 year old male who was recently DC from a short term rehab. Pt lives in a apartment with his wife, house has 3 steps to negotiate + hand rail on right, Pt then has 19 steps up with + hand rail on R to living floor. Pt admits that he is able to independently ambulate with a walker, however requires assistance with IADL's more independent with BADL's.

## 2017-03-11 NOTE — PHYSICAL THERAPY INITIAL EVALUATION ADULT - CRITERIA FOR SKILLED THERAPEUTIC INTERVENTIONS
anticipated equipment needs at discharge/predicted duration of therapy intervention/therapy frequency/anticipated discharge recommendation/risk reduction/prevention/functional limitations in following categories/rehab potential/impairments found

## 2017-03-11 NOTE — PHYSICAL THERAPY INITIAL EVALUATION ADULT - PERTINENT HX OF CURRENT PROBLEM, REHAB EVAL
80 y/o male with PMH HTN, HLD, CAD on ASA, IDDM, CHF, COPD former smoker on lasix, and hx of DVT 17 years ago on Coumadin p/w vomiting. Per patient, has been vomiting all day. States any times he tries to eat or drink he vomits. Started this morning. Family reports an oral temp today of 101 - been taking tylenol. Denies any associated CP, SOB, abdominal pain, diarrhea, numbness, dysuria, hematuria. Last admitted in January w/ subsequent rehab for new onset CHF, pneumonia, and UTI.

## 2017-03-11 NOTE — PHYSICAL THERAPY INITIAL EVALUATION ADULT - PRECAUTIONS/LIMITATIONS, REHAB EVAL
3/10 	GI-pt went for ERCP -biliary stent placed. *******If pt develops abdominal pain , recurrent vomiting melena, fever- obtain stat 	cbc/cmp/amylase/lipase chest xray/abdxray and page GI stat 3/11/17.  Pt will start on lovenox full dose and adv diet

## 2017-03-11 NOTE — PHYSICAL THERAPY INITIAL EVALUATION ADULT - ACTIVE RANGE OF MOTION EXAMINATION, REHAB EVAL
deficits as listed below/Right LE Active ROM was WFL (within functional limits)/bilateral upper extremity Active ROM was WFL (within functional limits)/LLE limited to grossly 3/4 available ROM

## 2017-03-11 NOTE — PHYSICAL THERAPY INITIAL EVALUATION ADULT - GAIT DEVIATIONS NOTED, PT EVAL
decreased step length/decreased weight-shifting ability/decreased velocity of limb motion/L foot decreased clearance,

## 2017-03-11 NOTE — PHYSICAL THERAPY INITIAL EVALUATION ADULT - DISCHARGE DISPOSITION, PT EVAL
Subacute Rehab. Pt not agreeable, pt will require assistance with mobility (expecially stairs) RW for gait (owned) Home PT for gait and strength progressions if going home./rehabilitation facility

## 2017-03-12 LAB
-  AMIKACIN: SIGNIFICANT CHANGE UP
-  AMPICILLIN/SULBACTAM: SIGNIFICANT CHANGE UP
-  AMPICILLIN: SIGNIFICANT CHANGE UP
-  AZTREONAM: SIGNIFICANT CHANGE UP
-  CEFAZOLIN: SIGNIFICANT CHANGE UP
-  CEFEPIME: SIGNIFICANT CHANGE UP
-  CEFOXITIN: SIGNIFICANT CHANGE UP
-  CEFTAZIDIME: SIGNIFICANT CHANGE UP
-  CEFTRIAXONE: SIGNIFICANT CHANGE UP
-  CIPROFLOXACIN: SIGNIFICANT CHANGE UP
-  ERTAPENEM: SIGNIFICANT CHANGE UP
-  GENTAMICIN: SIGNIFICANT CHANGE UP
-  IMIPENEM: SIGNIFICANT CHANGE UP
-  LEVOFLOXACIN: SIGNIFICANT CHANGE UP
-  MEROPENEM: SIGNIFICANT CHANGE UP
-  PIPERACILLIN/TAZOBACTAM: SIGNIFICANT CHANGE UP
-  TOBRAMYCIN: SIGNIFICANT CHANGE UP
-  TRIMETHOPRIM/SULFAMETHOXAZOLE: SIGNIFICANT CHANGE UP
ALBUMIN SERPL ELPH-MCNC: 2.7 G/DL — LOW (ref 3.3–5)
ALP SERPL-CCNC: 343 U/L — HIGH (ref 40–120)
ALT FLD-CCNC: 133 U/L — HIGH (ref 10–45)
ANION GAP SERPL CALC-SCNC: 16 MMOL/L — SIGNIFICANT CHANGE UP (ref 5–17)
AST SERPL-CCNC: 81 U/L — HIGH (ref 10–40)
BILIRUB DIRECT SERPL-MCNC: 1.5 MG/DL — HIGH (ref 0–0.2)
BILIRUB INDIRECT FLD-MCNC: 1.7 MG/DL — HIGH (ref 0.2–1)
BILIRUB SERPL-MCNC: 3.2 MG/DL — HIGH (ref 0.2–1.2)
BUN SERPL-MCNC: 17 MG/DL — SIGNIFICANT CHANGE UP (ref 7–23)
CALCIUM SERPL-MCNC: 9 MG/DL — SIGNIFICANT CHANGE UP (ref 8.4–10.5)
CHLORIDE SERPL-SCNC: 102 MMOL/L — SIGNIFICANT CHANGE UP (ref 96–108)
CO2 SERPL-SCNC: 24 MMOL/L — SIGNIFICANT CHANGE UP (ref 22–31)
CREAT SERPL-MCNC: 1.06 MG/DL — SIGNIFICANT CHANGE UP (ref 0.5–1.3)
CULTURE RESULTS: SIGNIFICANT CHANGE UP
GLUCOSE SERPL-MCNC: 116 MG/DL — HIGH (ref 70–99)
HCT VFR BLD CALC: 37.8 % — LOW (ref 39–50)
HGB BLD-MCNC: 12.3 G/DL — LOW (ref 13–17)
MCHC RBC-ENTMCNC: 31.1 PG — SIGNIFICANT CHANGE UP (ref 27–34)
MCHC RBC-ENTMCNC: 32.5 GM/DL — SIGNIFICANT CHANGE UP (ref 32–36)
MCV RBC AUTO: 95.5 FL — SIGNIFICANT CHANGE UP (ref 80–100)
METHOD TYPE: SIGNIFICANT CHANGE UP
ORGANISM # SPEC MICROSCOPIC CNT: SIGNIFICANT CHANGE UP
ORGANISM # SPEC MICROSCOPIC CNT: SIGNIFICANT CHANGE UP
PLATELET # BLD AUTO: 181 K/UL — SIGNIFICANT CHANGE UP (ref 150–400)
POTASSIUM SERPL-MCNC: 3.3 MMOL/L — LOW (ref 3.5–5.3)
POTASSIUM SERPL-SCNC: 3.3 MMOL/L — LOW (ref 3.5–5.3)
PROT SERPL-MCNC: 6.3 G/DL — SIGNIFICANT CHANGE UP (ref 6–8.3)
RBC # BLD: 3.96 M/UL — LOW (ref 4.2–5.8)
RBC # FLD: 15.9 % — HIGH (ref 10.3–14.5)
SODIUM SERPL-SCNC: 142 MMOL/L — SIGNIFICANT CHANGE UP (ref 135–145)
SPECIMEN SOURCE: SIGNIFICANT CHANGE UP
VANCOMYCIN TROUGH SERPL-MCNC: 4.7 UG/ML — LOW (ref 10–20)
WBC # BLD: 9.01 K/UL — SIGNIFICANT CHANGE UP (ref 3.8–10.5)
WBC # FLD AUTO: 9.01 K/UL — SIGNIFICANT CHANGE UP (ref 3.8–10.5)

## 2017-03-12 PROCEDURE — 99232 SBSQ HOSP IP/OBS MODERATE 35: CPT

## 2017-03-12 PROCEDURE — 99233 SBSQ HOSP IP/OBS HIGH 50: CPT

## 2017-03-12 RX ORDER — FUROSEMIDE 40 MG
40 TABLET ORAL DAILY
Qty: 0 | Refills: 0 | Status: DISCONTINUED | OUTPATIENT
Start: 2017-03-12 | End: 2017-03-17

## 2017-03-12 RX ORDER — POTASSIUM CHLORIDE 20 MEQ
40 PACKET (EA) ORAL ONCE
Qty: 0 | Refills: 0 | Status: COMPLETED | OUTPATIENT
Start: 2017-03-12 | End: 2017-03-12

## 2017-03-12 RX ADMIN — Medication 40 MILLIEQUIVALENT(S): at 17:54

## 2017-03-12 RX ADMIN — PIPERACILLIN AND TAZOBACTAM 25 GRAM(S): 4; .5 INJECTION, POWDER, LYOPHILIZED, FOR SOLUTION INTRAVENOUS at 01:28

## 2017-03-12 RX ADMIN — ATORVASTATIN CALCIUM 20 MILLIGRAM(S): 80 TABLET, FILM COATED ORAL at 21:45

## 2017-03-12 RX ADMIN — TAMSULOSIN HYDROCHLORIDE 0.4 MILLIGRAM(S): 0.4 CAPSULE ORAL at 21:46

## 2017-03-12 RX ADMIN — ENOXAPARIN SODIUM 100 MILLIGRAM(S): 100 INJECTION SUBCUTANEOUS at 06:40

## 2017-03-12 RX ADMIN — Medication 0.5 MILLIGRAM(S): at 17:54

## 2017-03-12 RX ADMIN — INSULIN GLARGINE 30 UNIT(S): 100 INJECTION, SOLUTION SUBCUTANEOUS at 22:12

## 2017-03-12 RX ADMIN — Medication 40 MILLIGRAM(S): at 17:53

## 2017-03-12 RX ADMIN — Medication 3 MILLILITER(S): at 06:40

## 2017-03-12 RX ADMIN — GABAPENTIN 200 MILLIGRAM(S): 400 CAPSULE ORAL at 21:45

## 2017-03-12 RX ADMIN — Medication 2: at 12:23

## 2017-03-12 RX ADMIN — Medication 81 MILLIGRAM(S): at 11:05

## 2017-03-12 RX ADMIN — Medication 3 MILLILITER(S): at 17:54

## 2017-03-12 RX ADMIN — LOSARTAN POTASSIUM 50 MILLIGRAM(S): 100 TABLET, FILM COATED ORAL at 06:48

## 2017-03-12 RX ADMIN — PIPERACILLIN AND TAZOBACTAM 25 GRAM(S): 4; .5 INJECTION, POWDER, LYOPHILIZED, FOR SOLUTION INTRAVENOUS at 18:27

## 2017-03-12 RX ADMIN — Medication 250 MILLIGRAM(S): at 06:49

## 2017-03-12 RX ADMIN — Medication 3 MILLILITER(S): at 11:05

## 2017-03-12 RX ADMIN — GABAPENTIN 200 MILLIGRAM(S): 400 CAPSULE ORAL at 06:40

## 2017-03-12 RX ADMIN — PANTOPRAZOLE SODIUM 40 MILLIGRAM(S): 20 TABLET, DELAYED RELEASE ORAL at 06:41

## 2017-03-12 RX ADMIN — ENOXAPARIN SODIUM 100 MILLIGRAM(S): 100 INJECTION SUBCUTANEOUS at 17:54

## 2017-03-12 RX ADMIN — Medication 3 MILLILITER(S): at 23:30

## 2017-03-12 RX ADMIN — GABAPENTIN 200 MILLIGRAM(S): 400 CAPSULE ORAL at 13:19

## 2017-03-12 RX ADMIN — PIPERACILLIN AND TAZOBACTAM 25 GRAM(S): 4; .5 INJECTION, POWDER, LYOPHILIZED, FOR SOLUTION INTRAVENOUS at 11:04

## 2017-03-12 RX ADMIN — Medication 1: at 17:54

## 2017-03-12 RX ADMIN — Medication 0.5 MILLIGRAM(S): at 06:40

## 2017-03-13 LAB
ANION GAP SERPL CALC-SCNC: 15 MMOL/L — SIGNIFICANT CHANGE UP (ref 5–17)
BUN SERPL-MCNC: 19 MG/DL — SIGNIFICANT CHANGE UP (ref 7–23)
CALCIUM SERPL-MCNC: 8.8 MG/DL — SIGNIFICANT CHANGE UP (ref 8.4–10.5)
CHLORIDE SERPL-SCNC: 102 MMOL/L — SIGNIFICANT CHANGE UP (ref 96–108)
CO2 SERPL-SCNC: 27 MMOL/L — SIGNIFICANT CHANGE UP (ref 22–31)
CREAT SERPL-MCNC: 1.1 MG/DL — SIGNIFICANT CHANGE UP (ref 0.5–1.3)
GLUCOSE SERPL-MCNC: 167 MG/DL — HIGH (ref 70–99)
POTASSIUM SERPL-MCNC: 3.7 MMOL/L — SIGNIFICANT CHANGE UP (ref 3.5–5.3)
POTASSIUM SERPL-SCNC: 3.7 MMOL/L — SIGNIFICANT CHANGE UP (ref 3.5–5.3)
SODIUM SERPL-SCNC: 144 MMOL/L — SIGNIFICANT CHANGE UP (ref 135–145)

## 2017-03-13 PROCEDURE — 99232 SBSQ HOSP IP/OBS MODERATE 35: CPT

## 2017-03-13 PROCEDURE — 99233 SBSQ HOSP IP/OBS HIGH 50: CPT

## 2017-03-13 RX ORDER — SODIUM CHLORIDE 0.65 %
1 AEROSOL, SPRAY (ML) NASAL
Qty: 0 | Refills: 0 | Status: DISCONTINUED | OUTPATIENT
Start: 2017-03-13 | End: 2017-03-17

## 2017-03-13 RX ORDER — IMIPENEM AND CILASTATIN 250; 250 MG/100ML; MG/100ML
500 INJECTION, POWDER, FOR SOLUTION INTRAVENOUS EVERY 6 HOURS
Qty: 0 | Refills: 0 | Status: DISCONTINUED | OUTPATIENT
Start: 2017-03-13 | End: 2017-03-13

## 2017-03-13 RX ORDER — ERTAPENEM SODIUM 1 G/1
1000 INJECTION, POWDER, LYOPHILIZED, FOR SOLUTION INTRAMUSCULAR; INTRAVENOUS EVERY 24 HOURS
Qty: 0 | Refills: 0 | Status: DISCONTINUED | OUTPATIENT
Start: 2017-03-13 | End: 2017-03-17

## 2017-03-13 RX ADMIN — TAMSULOSIN HYDROCHLORIDE 0.4 MILLIGRAM(S): 0.4 CAPSULE ORAL at 21:11

## 2017-03-13 RX ADMIN — Medication 0.5 MILLIGRAM(S): at 17:25

## 2017-03-13 RX ADMIN — Medication 3 MILLILITER(S): at 13:54

## 2017-03-13 RX ADMIN — LOSARTAN POTASSIUM 50 MILLIGRAM(S): 100 TABLET, FILM COATED ORAL at 06:23

## 2017-03-13 RX ADMIN — Medication 1: at 08:30

## 2017-03-13 RX ADMIN — ERTAPENEM SODIUM 120 MILLIGRAM(S): 1 INJECTION, POWDER, LYOPHILIZED, FOR SOLUTION INTRAMUSCULAR; INTRAVENOUS at 13:55

## 2017-03-13 RX ADMIN — Medication 0.5 MILLIGRAM(S): at 06:21

## 2017-03-13 RX ADMIN — Medication 3 MILLILITER(S): at 06:21

## 2017-03-13 RX ADMIN — PIPERACILLIN AND TAZOBACTAM 25 GRAM(S): 4; .5 INJECTION, POWDER, LYOPHILIZED, FOR SOLUTION INTRAVENOUS at 01:35

## 2017-03-13 RX ADMIN — ENOXAPARIN SODIUM 100 MILLIGRAM(S): 100 INJECTION SUBCUTANEOUS at 17:25

## 2017-03-13 RX ADMIN — Medication 3: at 16:57

## 2017-03-13 RX ADMIN — PANTOPRAZOLE SODIUM 40 MILLIGRAM(S): 20 TABLET, DELAYED RELEASE ORAL at 06:22

## 2017-03-13 RX ADMIN — ATORVASTATIN CALCIUM 20 MILLIGRAM(S): 80 TABLET, FILM COATED ORAL at 21:11

## 2017-03-13 RX ADMIN — INSULIN GLARGINE 30 UNIT(S): 100 INJECTION, SOLUTION SUBCUTANEOUS at 22:36

## 2017-03-13 RX ADMIN — GABAPENTIN 200 MILLIGRAM(S): 400 CAPSULE ORAL at 13:54

## 2017-03-13 RX ADMIN — Medication 3: at 12:35

## 2017-03-13 RX ADMIN — Medication 81 MILLIGRAM(S): at 13:54

## 2017-03-13 RX ADMIN — GABAPENTIN 200 MILLIGRAM(S): 400 CAPSULE ORAL at 21:11

## 2017-03-13 RX ADMIN — ENOXAPARIN SODIUM 100 MILLIGRAM(S): 100 INJECTION SUBCUTANEOUS at 06:21

## 2017-03-13 RX ADMIN — PIPERACILLIN AND TAZOBACTAM 25 GRAM(S): 4; .5 INJECTION, POWDER, LYOPHILIZED, FOR SOLUTION INTRAVENOUS at 08:56

## 2017-03-13 RX ADMIN — Medication 40 MILLIGRAM(S): at 06:24

## 2017-03-13 RX ADMIN — Medication 3 MILLILITER(S): at 17:25

## 2017-03-13 RX ADMIN — GABAPENTIN 200 MILLIGRAM(S): 400 CAPSULE ORAL at 06:22

## 2017-03-14 LAB
ALBUMIN SERPL ELPH-MCNC: 2.5 G/DL — LOW (ref 3.3–5)
ALP SERPL-CCNC: 327 U/L — HIGH (ref 40–120)
ALT FLD-CCNC: 81 U/L — HIGH (ref 10–45)
ANION GAP SERPL CALC-SCNC: 15 MMOL/L — SIGNIFICANT CHANGE UP (ref 5–17)
AST SERPL-CCNC: 39 U/L — SIGNIFICANT CHANGE UP (ref 10–40)
BILIRUB SERPL-MCNC: 1.3 MG/DL — HIGH (ref 0.2–1.2)
BUN SERPL-MCNC: 22 MG/DL — SIGNIFICANT CHANGE UP (ref 7–23)
CALCIUM SERPL-MCNC: 9.1 MG/DL — SIGNIFICANT CHANGE UP (ref 8.4–10.5)
CHLORIDE SERPL-SCNC: 102 MMOL/L — SIGNIFICANT CHANGE UP (ref 96–108)
CO2 SERPL-SCNC: 24 MMOL/L — SIGNIFICANT CHANGE UP (ref 22–31)
CREAT SERPL-MCNC: 1.2 MG/DL — SIGNIFICANT CHANGE UP (ref 0.5–1.3)
GLUCOSE SERPL-MCNC: 145 MG/DL — HIGH (ref 70–99)
HCT VFR BLD CALC: 34.4 % — LOW (ref 39–50)
HGB BLD-MCNC: 11.3 G/DL — LOW (ref 13–17)
INR BLD: 1.18 RATIO — HIGH (ref 0.88–1.16)
MCHC RBC-ENTMCNC: 31 PG — SIGNIFICANT CHANGE UP (ref 27–34)
MCHC RBC-ENTMCNC: 32.8 GM/DL — SIGNIFICANT CHANGE UP (ref 32–36)
MCV RBC AUTO: 94.5 FL — SIGNIFICANT CHANGE UP (ref 80–100)
PLATELET # BLD AUTO: 173 K/UL — SIGNIFICANT CHANGE UP (ref 150–400)
POTASSIUM SERPL-MCNC: 3.4 MMOL/L — LOW (ref 3.5–5.3)
POTASSIUM SERPL-SCNC: 3.4 MMOL/L — LOW (ref 3.5–5.3)
PROT SERPL-MCNC: 6 G/DL — SIGNIFICANT CHANGE UP (ref 6–8.3)
PROTHROM AB SERPL-ACNC: 13.3 SEC — HIGH (ref 10–13.1)
RBC # BLD: 3.64 M/UL — LOW (ref 4.2–5.8)
RBC # FLD: 16.4 % — HIGH (ref 10.3–14.5)
SODIUM SERPL-SCNC: 141 MMOL/L — SIGNIFICANT CHANGE UP (ref 135–145)
WBC # BLD: 7.71 K/UL — SIGNIFICANT CHANGE UP (ref 3.8–10.5)
WBC # FLD AUTO: 7.71 K/UL — SIGNIFICANT CHANGE UP (ref 3.8–10.5)

## 2017-03-14 PROCEDURE — 71010: CPT | Mod: 26

## 2017-03-14 PROCEDURE — 99232 SBSQ HOSP IP/OBS MODERATE 35: CPT

## 2017-03-14 PROCEDURE — 99233 SBSQ HOSP IP/OBS HIGH 50: CPT

## 2017-03-14 RX ORDER — WARFARIN SODIUM 2.5 MG/1
5 TABLET ORAL ONCE
Qty: 0 | Refills: 0 | Status: COMPLETED | OUTPATIENT
Start: 2017-03-14 | End: 2017-03-14

## 2017-03-14 RX ORDER — POTASSIUM CHLORIDE 20 MEQ
10 PACKET (EA) ORAL
Qty: 0 | Refills: 0 | Status: COMPLETED | OUTPATIENT
Start: 2017-03-14 | End: 2017-03-14

## 2017-03-14 RX ORDER — ERTAPENEM SODIUM 1 G/1
1 INJECTION, POWDER, LYOPHILIZED, FOR SOLUTION INTRAMUSCULAR; INTRAVENOUS
Qty: 1 | Refills: 0 | OUTPATIENT
Start: 2017-03-14 | End: 2017-03-23

## 2017-03-14 RX ADMIN — ATORVASTATIN CALCIUM 20 MILLIGRAM(S): 80 TABLET, FILM COATED ORAL at 21:04

## 2017-03-14 RX ADMIN — Medication 100 MILLIEQUIVALENT(S): at 19:39

## 2017-03-14 RX ADMIN — GABAPENTIN 200 MILLIGRAM(S): 400 CAPSULE ORAL at 21:04

## 2017-03-14 RX ADMIN — Medication 3 MILLILITER(S): at 17:37

## 2017-03-14 RX ADMIN — Medication 81 MILLIGRAM(S): at 12:16

## 2017-03-14 RX ADMIN — Medication 2: at 17:36

## 2017-03-14 RX ADMIN — Medication 3 MILLILITER(S): at 23:42

## 2017-03-14 RX ADMIN — Medication 3 MILLILITER(S): at 05:03

## 2017-03-14 RX ADMIN — LOSARTAN POTASSIUM 50 MILLIGRAM(S): 100 TABLET, FILM COATED ORAL at 05:02

## 2017-03-14 RX ADMIN — Medication 4: at 12:15

## 2017-03-14 RX ADMIN — TAMSULOSIN HYDROCHLORIDE 0.4 MILLIGRAM(S): 0.4 CAPSULE ORAL at 21:04

## 2017-03-14 RX ADMIN — Medication 3 MILLILITER(S): at 13:27

## 2017-03-14 RX ADMIN — WARFARIN SODIUM 5 MILLIGRAM(S): 2.5 TABLET ORAL at 21:11

## 2017-03-14 RX ADMIN — ENOXAPARIN SODIUM 100 MILLIGRAM(S): 100 INJECTION SUBCUTANEOUS at 17:37

## 2017-03-14 RX ADMIN — Medication 0.5 MILLIGRAM(S): at 05:03

## 2017-03-14 RX ADMIN — Medication 100 MILLIEQUIVALENT(S): at 21:02

## 2017-03-14 RX ADMIN — GABAPENTIN 200 MILLIGRAM(S): 400 CAPSULE ORAL at 13:27

## 2017-03-14 RX ADMIN — INSULIN GLARGINE 30 UNIT(S): 100 INJECTION, SOLUTION SUBCUTANEOUS at 22:14

## 2017-03-14 RX ADMIN — Medication 100 MILLIEQUIVALENT(S): at 18:49

## 2017-03-14 RX ADMIN — Medication 40 MILLIGRAM(S): at 05:02

## 2017-03-14 RX ADMIN — GABAPENTIN 200 MILLIGRAM(S): 400 CAPSULE ORAL at 05:02

## 2017-03-14 RX ADMIN — Medication 3 MILLILITER(S): at 00:19

## 2017-03-14 RX ADMIN — PANTOPRAZOLE SODIUM 40 MILLIGRAM(S): 20 TABLET, DELAYED RELEASE ORAL at 05:02

## 2017-03-14 RX ADMIN — ERTAPENEM SODIUM 120 MILLIGRAM(S): 1 INJECTION, POWDER, LYOPHILIZED, FOR SOLUTION INTRAMUSCULAR; INTRAVENOUS at 15:18

## 2017-03-14 RX ADMIN — Medication 0.5 MILLIGRAM(S): at 17:37

## 2017-03-14 NOTE — DISCHARGE NOTE ADULT - PLAN OF CARE
IMPROVED Continue iv antibiotics for bacteremia until 3/25/17. treated for cholangitis. HgA1C this admission.  Make sure you get your HgA1c checked every three months.  If you take oral diabetes medications, check your blood glucose two times a day.  If you take insulin, check your blood glucose before meals and at bedtime.  It's important not to skip any meals.  Keep a log of your blood glucose results and always take it with you to your doctor appointments.  Keep a list of your current medications including injectables and over the counter medications and bring this medication list with you to all your doctor appointments.  If you have not seen your ophthalmologist this year call for appointment.  Check your feet daily for redness, sores, or openings. Do not self treat. If no improvement in two days call your primary care physician for an appointment.  Low blood sugar (hypoglycemia) is a blood sugar below 70mg/dl. Check your blood sugar if you feel signs/symptoms of hypoglycemia. If your blood sugar is below 70 take 15 grams of carbohydrates (ex 4 oz of apple juice, 3-4 glucose tablets, or 4-6 oz of regular soda) wait 15 minutes and repeat blood sugar to make sure it comes up above 70.  If your blood sugar is above 70 and you are due for a meal, have a meal.  If you are not due for a meal have a snack.  This snack helps keeps your blood sugar at a safe range. Call your Health Care provider upon arrival home to make a follow up appointment within one week.  Take all inhalers as prescribed by your Health Care Provider.  Take steroids as prescribed by your Health Care Provider.  If your cough increases infrequency and severity and/or you have shortness of breath or increased shortness of breath call your Health Care Provider.  If you develop fever, chills, night sweats, malaise, and/or change in mental status call your Health care Provider.  Nutrition is very important.  Eat small frequent meals.  Increase your activity as tolerated.  Do not stay in bed all day Follow up with your medical doctor to establish long term blood pressure treatment goals. Take your "blood thinners" as prescribed.  Walking is encouraged, increase activity as tolerated.  If you develop new leg pain, swelling, and/or redness contact your healthcare provider.  If you develop new chest pain with difficulty breathing, a rapid heart rate and/or a feeling of passing out call emergency medical services 911.  Keep INR  2-3, Cont Lovenox and coumadin. Today's INR 1.22 Likely secondary to cholangitis/ESBL bacteremia. Continue iv antibiotics until 3/25/17. F/U with ID-

## 2017-03-14 NOTE — DISCHARGE NOTE ADULT - NS AS DC VTE INSTRUCTION
Coumadin/Warfarin - Compliance.../Coumadin/Warfarin - Follow-up monitoring.../Coumadin/Warfarin - Potential for adverse drug reactions and interactions/Coumadin/Warfarin - Dietary Advice...

## 2017-03-14 NOTE — DISCHARGE NOTE ADULT - HOSPITAL COURSE
80 y/o male with PMH HTN, HLD, CAD on ASA, IDDM, CHF, COPD former smoker on lasix, and hx of DVT 17 years ago on Coumadin p/w vomiting. Per patient, has been vomiting all day. States any times he tries to eat or drink he vomits. Started this morning. Family reports an oral temp today of 101 - been taking tylenol. Denies any associated CP, SOB, abdominal pain, diarrhea, numbness, dysuria, hematuria. No hx of gastroparesis. No hx of abdominal surgeries. Last admitted in January w/ subsequent rehab for new onset CHF, pneumonia, and UTI. PMD: Dr. Savage	  Admitted with cholangitis/cholodocholithiasis - No surgical intervention  Vanco/Zosyn  3/10 	GI-pt went for ERCP -biliary stent placed.   *******If pt develops abdominal pain , recurrent vomiting melena, fever- obtain stat 	cbc/cmp/amylase/lipase chest xray/abdxray and page GI stat  	Pulm- Dr. Wright consulted- pt wheezing, started on duonebs, as d/w Dr. Faustin- 	monitor signs of hypercapnia or respiratory distress- avoid narcotic analgesics   	Cards- ? when to restart coumadin - will hold tonight as d/w Dr. Faustin   ID- blood cx:anaerobic -gramvariable rods- continue with zosyn and vanco  f/u ID   3/11/17 Pt will start on lovenox full dose and adv diet  3/13 ID- changes abx to ertapenem - will need until 3/25 - PICC to be placed, ?(  )start coumadin after Picc is placed - D/w Dr. FAUSTIN     3/14: PICC line inserted-- c/w abx till 3/25--will resume lovenox            Dispo: pt refused rehab --d/c home   3/15/17 pt agreed to go to rehab in am , [facility]. , +/- HEART FAILURE VS COPD, H/O DVT ON LOVENOX  Pt's blood pressure persistently elevated , losarten increased to 100 mgs daily 80 y/o male with PMH HTN, HLD, CAD on ASA, IDDM, CHF, COPD former smoker on lasix, and hx of DVT 17 years ago on Coumadin p/w vomiting. Per patient, has been vomiting all day. States any times he tries to eat or drink he vomits. Started this morning. Family reports an oral temp today of 101 - been taking tylenol. Denies any associated CP, SOB, abdominal pain, diarrhea, numbness, dysuria, hematuria. No hx of gastroparesis. No hx of abdominal surgeries. Last admitted in January w/ subsequent rehab for new onset CHF, pneumonia, and UTI. PMD: Dr. Svaage	  Admitted with cholangitis/cholodocholithiasis - No surgical intervention  Vanco/Zosyn  3/10 	GI-pt went for ERCP -biliary stent placed.   *******If pt develops abdominal pain , recurrent vomiting melena, fever- obtain stat 	cbc/cmp/amylase/lipase chest xray/abdxray and page GI stat  	Pulm- Dr. Wright consulted- pt wheezing, started on duonebs, as d/w Dr. Melgoza- 	monitor signs of hypercapnia or respiratory distress- avoid narcotic analgesics   	Cards- ? when to restart coumadin - will hold tonight as d/w Dr. Melgoza   ID- blood cx:anaerobic -gramvariable rods- continue with zosyn and vanco  f/u ID   3/11/17 Pt will start on lovenox full dose and adv diet  3/13 ID- changes abx to ertapenem - will need until 3/25 - PICC to be placed, ?(  )start coumadin after Picc is placed -   3/14: PICC line inserted-- c/w abx till 3/25--will resume lovenox            Dispo: pt refused rehab --d/c home   3/15/17 pt agreed to go to rehab in am , [facility]. , +/- HEART FAILURE VS COPD, H/O DVT ON LOVENOX  Pt's blood pressure persistently elevated , losarten increased to 100 mgs daily

## 2017-03-14 NOTE — DISCHARGE NOTE ADULT - MEDICATION SUMMARY - MEDICATIONS TO STOP TAKING
I will STOP taking the medications listed below when I get home from the hospital:  None I will STOP taking the medications listed below when I get home from the hospital:    oxyCODONE 5 mg oral tablet  -- 1 tab(s) by mouth every 4 hours, As needed, Severe Pain (7 - 10)    lidocaine 5% topical film  -- Apply on skin to affected area once a day

## 2017-03-14 NOTE — DISCHARGE NOTE ADULT - SECONDARY DIAGNOSIS.
Type 2 diabetes mellitus without complication, unspecified long term insulin use status Chronic obstructive pulmonary disease (COPD) Hypertension Deep vein thrombosis (DVT)

## 2017-03-14 NOTE — DISCHARGE NOTE ADULT - CARE PROVIDER_API CALL
Francesco Savage), Family Medicine  [address] [place] [address]  Phone: [phone]  Fax: [phone] Francesco Savage), Family Medicine  [address] [place] [address]  Phone: [phone]  Fax: [phone]    Alicja Marks), Critical Care Medicine; Internal Medicine; [place] [address]  Phone: [phone]  Fax: [phone]

## 2017-03-14 NOTE — DISCHARGE NOTE ADULT - CARE PLAN
Principal Discharge DX:	Sepsis  Goal:	IMPROVED  Instructions for follow-up, activity and diet:	Continue iv antibiotics for bacteremia until 3/25/17. treated for cholangitis.  Secondary Diagnosis:	Type 2 diabetes mellitus without complication, unspecified long term insulin use status  Instructions for follow-up, activity and diet:	HgA1C this admission.  Make sure you get your HgA1c checked every three months.  If you take oral diabetes medications, check your blood glucose two times a day.  If you take insulin, check your blood glucose before meals and at bedtime.  It's important not to skip any meals.  Keep a log of your blood glucose results and always take it with you to your doctor appointments.  Keep a list of your current medications including injectables and over the counter medications and bring this medication list with you to all your doctor appointments.  If you have not seen your ophthalmologist this year call for appointment.  Check your feet daily for redness, sores, or openings. Do not self treat. If no improvement in two days call your primary care physician for an appointment.  Low blood sugar (hypoglycemia) is a blood sugar below 70mg/dl. Check your blood sugar if you feel signs/symptoms of hypoglycemia. If your blood sugar is below 70 take 15 grams of carbohydrates (ex 4 oz of apple juice, 3-4 glucose tablets, or 4-6 oz of regular soda) wait 15 minutes and repeat blood sugar to make sure it comes up above 70.  If your blood sugar is above 70 and you are due for a meal, have a meal.  If you are not due for a meal have a snack.  This snack helps keeps your blood sugar at a safe range.  Secondary Diagnosis:	Chronic obstructive pulmonary disease (COPD)  Instructions for follow-up, activity and diet:	Call your Health Care provider upon arrival home to make a follow up appointment within one week.  Take all inhalers as prescribed by your Health Care Provider.  Take steroids as prescribed by your Health Care Provider.  If your cough increases infrequency and severity and/or you have shortness of breath or increased shortness of breath call your Health Care Provider.  If you develop fever, chills, night sweats, malaise, and/or change in mental status call your Health care Provider.  Nutrition is very important.  Eat small frequent meals.  Increase your activity as tolerated.  Do not stay in bed all day  Secondary Diagnosis:	Hypertension  Instructions for follow-up, activity and diet:	Follow up with your medical doctor to establish long term blood pressure treatment goals. Principal Discharge DX:	Sepsis  Goal:	IMPROVED  Instructions for follow-up, activity and diet:	Continue iv antibiotics for bacteremia until 3/25/17. treated for cholangitis.  Secondary Diagnosis:	Type 2 diabetes mellitus without complication, unspecified long term insulin use status  Instructions for follow-up, activity and diet:	HgA1C this admission.  Make sure you get your HgA1c checked every three months.  If you take oral diabetes medications, check your blood glucose two times a day.  If you take insulin, check your blood glucose before meals and at bedtime.  It's important not to skip any meals.  Keep a log of your blood glucose results and always take it with you to your doctor appointments.  Keep a list of your current medications including injectables and over the counter medications and bring this medication list with you to all your doctor appointments.  If you have not seen your ophthalmologist this year call for appointment.  Check your feet daily for redness, sores, or openings. Do not self treat. If no improvement in two days call your primary care physician for an appointment.  Low blood sugar (hypoglycemia) is a blood sugar below 70mg/dl. Check your blood sugar if you feel signs/symptoms of hypoglycemia. If your blood sugar is below 70 take 15 grams of carbohydrates (ex 4 oz of apple juice, 3-4 glucose tablets, or 4-6 oz of regular soda) wait 15 minutes and repeat blood sugar to make sure it comes up above 70.  If your blood sugar is above 70 and you are due for a meal, have a meal.  If you are not due for a meal have a snack.  This snack helps keeps your blood sugar at a safe range.  Secondary Diagnosis:	Chronic obstructive pulmonary disease (COPD)  Instructions for follow-up, activity and diet:	Call your Health Care provider upon arrival home to make a follow up appointment within one week.  Take all inhalers as prescribed by your Health Care Provider.  Take steroids as prescribed by your Health Care Provider.  If your cough increases infrequency and severity and/or you have shortness of breath or increased shortness of breath call your Health Care Provider.  If you develop fever, chills, night sweats, malaise, and/or change in mental status call your Health care Provider.  Nutrition is very important.  Eat small frequent meals.  Increase your activity as tolerated.  Do not stay in bed all day  Secondary Diagnosis:	Hypertension  Instructions for follow-up, activity and diet:	Follow up with your medical doctor to establish long term blood pressure treatment goals.  Secondary Diagnosis:	Deep vein thrombosis (DVT)  Instructions for follow-up, activity and diet:	Take your "blood thinners" as prescribed.  Walking is encouraged, increase activity as tolerated.  If you develop new leg pain, swelling, and/or redness contact your healthcare provider.  If you develop new chest pain with difficulty breathing, a rapid heart rate and/or a feeling of passing out call emergency medical services 911.  Keep INR  2-3, Cont Lovenox and coumadin. Today's INR 1.22 Principal Discharge DX:	Sepsis  Goal:	IMPROVED  Instructions for follow-up, activity and diet:	Likely secondary to cholangitis/ESBL bacteremia. Continue iv antibiotics until 3/25/17. F/U with ID-  Secondary Diagnosis:	Type 2 diabetes mellitus without complication, unspecified long term insulin use status  Instructions for follow-up, activity and diet:	HgA1C this admission.  Make sure you get your HgA1c checked every three months.  If you take oral diabetes medications, check your blood glucose two times a day.  If you take insulin, check your blood glucose before meals and at bedtime.  It's important not to skip any meals.  Keep a log of your blood glucose results and always take it with you to your doctor appointments.  Keep a list of your current medications including injectables and over the counter medications and bring this medication list with you to all your doctor appointments.  If you have not seen your ophthalmologist this year call for appointment.  Check your feet daily for redness, sores, or openings. Do not self treat. If no improvement in two days call your primary care physician for an appointment.  Low blood sugar (hypoglycemia) is a blood sugar below 70mg/dl. Check your blood sugar if you feel signs/symptoms of hypoglycemia. If your blood sugar is below 70 take 15 grams of carbohydrates (ex 4 oz of apple juice, 3-4 glucose tablets, or 4-6 oz of regular soda) wait 15 minutes and repeat blood sugar to make sure it comes up above 70.  If your blood sugar is above 70 and you are due for a meal, have a meal.  If you are not due for a meal have a snack.  This snack helps keeps your blood sugar at a safe range.  Secondary Diagnosis:	Chronic obstructive pulmonary disease (COPD)  Instructions for follow-up, activity and diet:	Call your Health Care provider upon arrival home to make a follow up appointment within one week.  Take all inhalers as prescribed by your Health Care Provider.  Take steroids as prescribed by your Health Care Provider.  If your cough increases infrequency and severity and/or you have shortness of breath or increased shortness of breath call your Health Care Provider.  If you develop fever, chills, night sweats, malaise, and/or change in mental status call your Health care Provider.  Nutrition is very important.  Eat small frequent meals.  Increase your activity as tolerated.  Do not stay in bed all day  Secondary Diagnosis:	Hypertension  Instructions for follow-up, activity and diet:	Follow up with your medical doctor to establish long term blood pressure treatment goals.  Secondary Diagnosis:	Deep vein thrombosis (DVT)  Instructions for follow-up, activity and diet:	Take your "blood thinners" as prescribed.  Walking is encouraged, increase activity as tolerated.  If you develop new leg pain, swelling, and/or redness contact your healthcare provider.  If you develop new chest pain with difficulty breathing, a rapid heart rate and/or a feeling of passing out call emergency medical services 911.  Keep INR  2-3, Cont Lovenox and coumadin. Today's INR 1.22

## 2017-03-14 NOTE — DISCHARGE NOTE ADULT - MEDICATION SUMMARY - MEDICATIONS TO CHANGE
I will SWITCH the dose or number of times a day I take the medications listed below when I get home from the hospital:    Coumadin 5 mg oral tablet  -- 1 tab(s) by mouth once a day    furosemide 20 mg oral tablet  -- 1 tab(s) by mouth once a day    Lantus Solostar Pen 100 units/mL subcutaneous solution  -- 42 unit(s) subcutaneous once a day (at bedtime)    losartan 50 mg oral tablet  -- 1 tab(s) by mouth once a day    methylPREDNISolone  -- 4 milligram(s) by mouth  before breakfast x 4 more doses  4 mg by mouth after lunch x 2 more doses  4 mg by mouth after dinner x 2 more doses  8 mg by mouth at bedtime x 1 more dose    Starting 1/21  4 mg by mouth at bedtime x 3 doses I will SWITCH the dose or number of times a day I take the medications listed below when I get home from the hospital:    furosemide 20 mg oral tablet  -- 1 tab(s) by mouth once a day    Lantus Solostar Pen 100 units/mL subcutaneous solution  -- 42 unit(s) subcutaneous once a day (at bedtime)    losartan 50 mg oral tablet  -- 1 tab(s) by mouth once a day    methylPREDNISolone  -- 4 milligram(s) by mouth  before breakfast x 4 more doses  4 mg by mouth after lunch x 2 more doses  4 mg by mouth after dinner x 2 more doses  8 mg by mouth at bedtime x 1 more dose    Starting 1/21  4 mg by mouth at bedtime x 3 doses

## 2017-03-15 LAB
ANION GAP SERPL CALC-SCNC: 13 MMOL/L — SIGNIFICANT CHANGE UP (ref 5–17)
APTT BLD: 37.9 SEC — HIGH (ref 27.5–37.4)
BUN SERPL-MCNC: 20 MG/DL — SIGNIFICANT CHANGE UP (ref 7–23)
CALCIUM SERPL-MCNC: 9.5 MG/DL — SIGNIFICANT CHANGE UP (ref 8.4–10.5)
CHLORIDE SERPL-SCNC: 102 MMOL/L — SIGNIFICANT CHANGE UP (ref 96–108)
CO2 SERPL-SCNC: 27 MMOL/L — SIGNIFICANT CHANGE UP (ref 22–31)
CREAT SERPL-MCNC: 0.92 MG/DL — SIGNIFICANT CHANGE UP (ref 0.5–1.3)
CULTURE RESULTS: SIGNIFICANT CHANGE UP
GLUCOSE SERPL-MCNC: 169 MG/DL — HIGH (ref 70–99)
INR BLD: 1.08 RATIO — SIGNIFICANT CHANGE UP (ref 0.88–1.16)
MAGNESIUM SERPL-MCNC: 1.7 MG/DL — SIGNIFICANT CHANGE UP (ref 1.6–2.6)
POTASSIUM SERPL-MCNC: 3.6 MMOL/L — SIGNIFICANT CHANGE UP (ref 3.5–5.3)
POTASSIUM SERPL-SCNC: 3.6 MMOL/L — SIGNIFICANT CHANGE UP (ref 3.5–5.3)
PROTHROM AB SERPL-ACNC: 12.2 SEC — SIGNIFICANT CHANGE UP (ref 10–13.1)
SODIUM SERPL-SCNC: 142 MMOL/L — SIGNIFICANT CHANGE UP (ref 135–145)
SPECIMEN SOURCE: SIGNIFICANT CHANGE UP

## 2017-03-15 PROCEDURE — 99233 SBSQ HOSP IP/OBS HIGH 50: CPT

## 2017-03-15 RX ORDER — LOSARTAN POTASSIUM 100 MG/1
75 TABLET, FILM COATED ORAL DAILY
Qty: 0 | Refills: 0 | Status: DISCONTINUED | OUTPATIENT
Start: 2017-03-15 | End: 2017-03-17

## 2017-03-15 RX ORDER — WARFARIN SODIUM 2.5 MG/1
7 TABLET ORAL ONCE
Qty: 0 | Refills: 0 | Status: COMPLETED | OUTPATIENT
Start: 2017-03-15 | End: 2017-03-15

## 2017-03-15 RX ADMIN — GABAPENTIN 200 MILLIGRAM(S): 400 CAPSULE ORAL at 13:12

## 2017-03-15 RX ADMIN — INSULIN GLARGINE 30 UNIT(S): 100 INJECTION, SOLUTION SUBCUTANEOUS at 21:40

## 2017-03-15 RX ADMIN — Medication 3 MILLILITER(S): at 05:29

## 2017-03-15 RX ADMIN — Medication 3 MILLILITER(S): at 23:56

## 2017-03-15 RX ADMIN — Medication 3 MILLILITER(S): at 13:12

## 2017-03-15 RX ADMIN — WARFARIN SODIUM 7 MILLIGRAM(S): 2.5 TABLET ORAL at 21:39

## 2017-03-15 RX ADMIN — Medication 1: at 21:39

## 2017-03-15 RX ADMIN — ATORVASTATIN CALCIUM 20 MILLIGRAM(S): 80 TABLET, FILM COATED ORAL at 21:39

## 2017-03-15 RX ADMIN — LOSARTAN POTASSIUM 50 MILLIGRAM(S): 100 TABLET, FILM COATED ORAL at 05:43

## 2017-03-15 RX ADMIN — Medication 0.5 MILLIGRAM(S): at 18:04

## 2017-03-15 RX ADMIN — ENOXAPARIN SODIUM 100 MILLIGRAM(S): 100 INJECTION SUBCUTANEOUS at 17:11

## 2017-03-15 RX ADMIN — Medication 0.5 MILLIGRAM(S): at 05:38

## 2017-03-15 RX ADMIN — GABAPENTIN 200 MILLIGRAM(S): 400 CAPSULE ORAL at 05:37

## 2017-03-15 RX ADMIN — TAMSULOSIN HYDROCHLORIDE 0.4 MILLIGRAM(S): 0.4 CAPSULE ORAL at 21:39

## 2017-03-15 RX ADMIN — Medication 81 MILLIGRAM(S): at 12:21

## 2017-03-15 RX ADMIN — GABAPENTIN 200 MILLIGRAM(S): 400 CAPSULE ORAL at 21:39

## 2017-03-15 RX ADMIN — PANTOPRAZOLE SODIUM 40 MILLIGRAM(S): 20 TABLET, DELAYED RELEASE ORAL at 05:38

## 2017-03-15 RX ADMIN — ENOXAPARIN SODIUM 100 MILLIGRAM(S): 100 INJECTION SUBCUTANEOUS at 05:43

## 2017-03-15 RX ADMIN — Medication 24 MILLIGRAM(S): at 13:13

## 2017-03-15 RX ADMIN — ERTAPENEM SODIUM 120 MILLIGRAM(S): 1 INJECTION, POWDER, LYOPHILIZED, FOR SOLUTION INTRAMUSCULAR; INTRAVENOUS at 13:13

## 2017-03-15 RX ADMIN — Medication 3 MILLILITER(S): at 18:04

## 2017-03-15 RX ADMIN — Medication 3: at 12:21

## 2017-03-15 RX ADMIN — Medication 2: at 17:10

## 2017-03-15 RX ADMIN — Medication 1: at 09:00

## 2017-03-15 RX ADMIN — Medication 40 MILLIGRAM(S): at 05:30

## 2017-03-16 LAB
CULTURE RESULTS: SIGNIFICANT CHANGE UP
INR BLD: 1.15 RATIO — SIGNIFICANT CHANGE UP (ref 0.88–1.16)
PROTHROM AB SERPL-ACNC: 13 SEC — SIGNIFICANT CHANGE UP (ref 10–13.1)
SPECIMEN SOURCE: SIGNIFICANT CHANGE UP

## 2017-03-16 PROCEDURE — 99233 SBSQ HOSP IP/OBS HIGH 50: CPT

## 2017-03-16 RX ORDER — LOSARTAN POTASSIUM 100 MG/1
4 TABLET, FILM COATED ORAL
Qty: 0 | Refills: 0 | COMMUNITY
Start: 2017-03-16

## 2017-03-16 RX ORDER — INSULIN GLARGINE 100 [IU]/ML
30 INJECTION, SOLUTION SUBCUTANEOUS
Qty: 0 | Refills: 0 | COMMUNITY
Start: 2017-03-16

## 2017-03-16 RX ORDER — WARFARIN SODIUM 2.5 MG/1
8 TABLET ORAL ONCE
Qty: 0 | Refills: 0 | Status: COMPLETED | OUTPATIENT
Start: 2017-03-16 | End: 2017-03-16

## 2017-03-16 RX ORDER — ENOXAPARIN SODIUM 100 MG/ML
100 INJECTION SUBCUTANEOUS
Qty: 0 | Refills: 0 | COMMUNITY
Start: 2017-03-16

## 2017-03-16 RX ORDER — LOSARTAN POTASSIUM 100 MG/1
3 TABLET, FILM COATED ORAL
Qty: 0 | Refills: 0 | COMMUNITY
Start: 2017-03-16

## 2017-03-16 RX ORDER — ENOXAPARIN SODIUM 100 MG/ML
42 INJECTION SUBCUTANEOUS
Qty: 0 | Refills: 0 | COMMUNITY

## 2017-03-16 RX ORDER — OMEGA-3 ACID ETHYL ESTERS 1 G
1 CAPSULE ORAL
Qty: 0 | Refills: 0 | COMMUNITY

## 2017-03-16 RX ORDER — INSULIN GLARGINE 100 [IU]/ML
25 INJECTION, SOLUTION SUBCUTANEOUS
Qty: 0 | Refills: 0 | DISCHARGE
Start: 2017-03-16

## 2017-03-16 RX ADMIN — LOSARTAN POTASSIUM 75 MILLIGRAM(S): 100 TABLET, FILM COATED ORAL at 05:37

## 2017-03-16 RX ADMIN — ERTAPENEM SODIUM 120 MILLIGRAM(S): 1 INJECTION, POWDER, LYOPHILIZED, FOR SOLUTION INTRAMUSCULAR; INTRAVENOUS at 13:02

## 2017-03-16 RX ADMIN — TAMSULOSIN HYDROCHLORIDE 0.4 MILLIGRAM(S): 0.4 CAPSULE ORAL at 21:36

## 2017-03-16 RX ADMIN — Medication 2: at 22:08

## 2017-03-16 RX ADMIN — Medication 4 MILLIGRAM(S): at 16:59

## 2017-03-16 RX ADMIN — Medication 0.5 MILLIGRAM(S): at 17:00

## 2017-03-16 RX ADMIN — Medication 5: at 12:34

## 2017-03-16 RX ADMIN — WARFARIN SODIUM 8 MILLIGRAM(S): 2.5 TABLET ORAL at 21:36

## 2017-03-16 RX ADMIN — Medication 4 MILLIGRAM(S): at 06:45

## 2017-03-16 RX ADMIN — Medication 8 MILLIGRAM(S): at 21:35

## 2017-03-16 RX ADMIN — Medication 81 MILLIGRAM(S): at 12:07

## 2017-03-16 RX ADMIN — Medication 0.5 MILLIGRAM(S): at 05:36

## 2017-03-16 RX ADMIN — GABAPENTIN 200 MILLIGRAM(S): 400 CAPSULE ORAL at 13:02

## 2017-03-16 RX ADMIN — Medication 3 MILLILITER(S): at 23:08

## 2017-03-16 RX ADMIN — ENOXAPARIN SODIUM 100 MILLIGRAM(S): 100 INJECTION SUBCUTANEOUS at 05:36

## 2017-03-16 RX ADMIN — Medication 1 SPRAY(S): at 05:38

## 2017-03-16 RX ADMIN — PANTOPRAZOLE SODIUM 40 MILLIGRAM(S): 20 TABLET, DELAYED RELEASE ORAL at 05:36

## 2017-03-16 RX ADMIN — Medication 4 MILLIGRAM(S): at 13:02

## 2017-03-16 RX ADMIN — Medication 3 MILLILITER(S): at 12:07

## 2017-03-16 RX ADMIN — GABAPENTIN 200 MILLIGRAM(S): 400 CAPSULE ORAL at 05:37

## 2017-03-16 RX ADMIN — Medication 3 MILLILITER(S): at 17:00

## 2017-03-16 RX ADMIN — Medication 3 MILLILITER(S): at 05:36

## 2017-03-16 RX ADMIN — INSULIN GLARGINE 30 UNIT(S): 100 INJECTION, SOLUTION SUBCUTANEOUS at 22:07

## 2017-03-16 RX ADMIN — ATORVASTATIN CALCIUM 20 MILLIGRAM(S): 80 TABLET, FILM COATED ORAL at 21:35

## 2017-03-16 RX ADMIN — GABAPENTIN 200 MILLIGRAM(S): 400 CAPSULE ORAL at 21:35

## 2017-03-16 RX ADMIN — Medication 4: at 17:00

## 2017-03-16 RX ADMIN — ENOXAPARIN SODIUM 100 MILLIGRAM(S): 100 INJECTION SUBCUTANEOUS at 17:00

## 2017-03-16 RX ADMIN — Medication 3: at 08:25

## 2017-03-16 RX ADMIN — Medication 40 MILLIGRAM(S): at 05:36

## 2017-03-16 NOTE — PROVIDER CONTACT NOTE (OTHER) - ASSESSMENT
radial pulse regular.  Pt. asymptomatic with no complaints of pain.  Pt. continues with PIERCE and expiratory wheezing on duonebs and PO medrol
Pt asymptomatic

## 2017-03-16 NOTE — PROVIDER CONTACT NOTE (OTHER) - ACTION/TREATMENT ORDERED:
Recheck BP in one hour
Retake pulse in 1 hour and report findings; possible EKG if HR irregular and/or pt. continues with tachycardia.

## 2017-03-16 NOTE — PROVIDER CONTACT NOTE (OTHER) - BACKGROUND
Pt. with history of COPD and CHF.  Medrol initiated x 1 day ago;pt. administered 24mg PO medrol 3/15/2017

## 2017-03-17 VITALS
SYSTOLIC BLOOD PRESSURE: 157 MMHG | TEMPERATURE: 97 F | OXYGEN SATURATION: 93 % | HEART RATE: 87 BPM | RESPIRATION RATE: 18 BRPM | DIASTOLIC BLOOD PRESSURE: 81 MMHG

## 2017-03-17 LAB
APTT BLD: 40.8 SEC — HIGH (ref 27.5–37.4)
INR BLD: 1.22 RATIO — HIGH (ref 0.88–1.16)
PROTHROM AB SERPL-ACNC: 13.8 SEC — HIGH (ref 10–13.1)

## 2017-03-17 PROCEDURE — 85610 PROTHROMBIN TIME: CPT

## 2017-03-17 PROCEDURE — 86900 BLOOD TYPING SEROLOGIC ABO: CPT

## 2017-03-17 PROCEDURE — 99233 SBSQ HOSP IP/OBS HIGH 50: CPT

## 2017-03-17 PROCEDURE — 97162 PT EVAL MOD COMPLEX 30 MIN: CPT

## 2017-03-17 PROCEDURE — 87186 SC STD MICRODIL/AGAR DIL: CPT

## 2017-03-17 PROCEDURE — 97116 GAIT TRAINING THERAPY: CPT

## 2017-03-17 PROCEDURE — 87086 URINE CULTURE/COLONY COUNT: CPT

## 2017-03-17 PROCEDURE — 36569 INSJ PICC 5 YR+ W/O IMAGING: CPT

## 2017-03-17 PROCEDURE — 85014 HEMATOCRIT: CPT

## 2017-03-17 PROCEDURE — 83880 ASSAY OF NATRIURETIC PEPTIDE: CPT

## 2017-03-17 PROCEDURE — 74177 CT ABD & PELVIS W/CONTRAST: CPT

## 2017-03-17 PROCEDURE — 96375 TX/PRO/DX INJ NEW DRUG ADDON: CPT | Mod: XU

## 2017-03-17 PROCEDURE — 81001 URINALYSIS AUTO W/SCOPE: CPT

## 2017-03-17 PROCEDURE — 87798 DETECT AGENT NOS DNA AMP: CPT

## 2017-03-17 PROCEDURE — 87633 RESP VIRUS 12-25 TARGETS: CPT

## 2017-03-17 PROCEDURE — 51701 INSERT BLADDER CATHETER: CPT

## 2017-03-17 PROCEDURE — 80053 COMPREHEN METABOLIC PANEL: CPT

## 2017-03-17 PROCEDURE — 80048 BASIC METABOLIC PNL TOTAL CA: CPT

## 2017-03-17 PROCEDURE — 99285 EMERGENCY DEPT VISIT HI MDM: CPT | Mod: 25

## 2017-03-17 PROCEDURE — 84295 ASSAY OF SERUM SODIUM: CPT

## 2017-03-17 PROCEDURE — C1769: CPT

## 2017-03-17 PROCEDURE — 82330 ASSAY OF CALCIUM: CPT

## 2017-03-17 PROCEDURE — 85027 COMPLETE CBC AUTOMATED: CPT

## 2017-03-17 PROCEDURE — 85730 THROMBOPLASTIN TIME PARTIAL: CPT

## 2017-03-17 PROCEDURE — 82803 BLOOD GASES ANY COMBINATION: CPT

## 2017-03-17 PROCEDURE — 97530 THERAPEUTIC ACTIVITIES: CPT

## 2017-03-17 PROCEDURE — 80076 HEPATIC FUNCTION PANEL: CPT

## 2017-03-17 PROCEDURE — 84132 ASSAY OF SERUM POTASSIUM: CPT

## 2017-03-17 PROCEDURE — 71045 X-RAY EXAM CHEST 1 VIEW: CPT

## 2017-03-17 PROCEDURE — 86850 RBC ANTIBODY SCREEN: CPT

## 2017-03-17 PROCEDURE — 93005 ELECTROCARDIOGRAM TRACING: CPT | Mod: XU

## 2017-03-17 PROCEDURE — 74328 X-RAY BILE DUCT ENDOSCOPY: CPT

## 2017-03-17 PROCEDURE — 94640 AIRWAY INHALATION TREATMENT: CPT

## 2017-03-17 PROCEDURE — 80202 ASSAY OF VANCOMYCIN: CPT

## 2017-03-17 PROCEDURE — 82435 ASSAY OF BLOOD CHLORIDE: CPT

## 2017-03-17 PROCEDURE — 87040 BLOOD CULTURE FOR BACTERIA: CPT

## 2017-03-17 PROCEDURE — 84100 ASSAY OF PHOSPHORUS: CPT

## 2017-03-17 PROCEDURE — 87486 CHLMYD PNEUM DNA AMP PROBE: CPT

## 2017-03-17 PROCEDURE — 87581 M.PNEUMON DNA AMP PROBE: CPT

## 2017-03-17 PROCEDURE — 83605 ASSAY OF LACTIC ACID: CPT

## 2017-03-17 PROCEDURE — 82947 ASSAY GLUCOSE BLOOD QUANT: CPT

## 2017-03-17 PROCEDURE — 86901 BLOOD TYPING SEROLOGIC RH(D): CPT

## 2017-03-17 PROCEDURE — C1751: CPT

## 2017-03-17 PROCEDURE — 83735 ASSAY OF MAGNESIUM: CPT

## 2017-03-17 PROCEDURE — 96374 THER/PROPH/DIAG INJ IV PUSH: CPT | Mod: XU

## 2017-03-17 RX ORDER — SENNA PLUS 8.6 MG/1
2 TABLET ORAL
Qty: 0 | Refills: 0 | DISCHARGE
Start: 2017-03-17

## 2017-03-17 RX ORDER — WARFARIN SODIUM 2.5 MG/1
1 TABLET ORAL
Qty: 0 | Refills: 0 | COMMUNITY

## 2017-03-17 RX ORDER — LOSARTAN POTASSIUM 100 MG/1
1 TABLET, FILM COATED ORAL
Qty: 0 | Refills: 0 | COMMUNITY
Start: 2017-03-17

## 2017-03-17 RX ORDER — DOCUSATE SODIUM 100 MG
1 CAPSULE ORAL
Qty: 0 | Refills: 0 | DISCHARGE
Start: 2017-03-17

## 2017-03-17 RX ORDER — LOSARTAN POTASSIUM 100 MG/1
100 TABLET, FILM COATED ORAL DAILY
Qty: 0 | Refills: 0 | Status: DISCONTINUED | OUTPATIENT
Start: 2017-03-17 | End: 2017-03-17

## 2017-03-17 RX ADMIN — Medication 81 MILLIGRAM(S): at 11:37

## 2017-03-17 RX ADMIN — Medication 2: at 08:30

## 2017-03-17 RX ADMIN — Medication 4: at 12:14

## 2017-03-17 RX ADMIN — ENOXAPARIN SODIUM 100 MILLIGRAM(S): 100 INJECTION SUBCUTANEOUS at 05:14

## 2017-03-17 RX ADMIN — Medication 4 MILLIGRAM(S): at 06:36

## 2017-03-17 RX ADMIN — Medication 40 MILLIGRAM(S): at 05:15

## 2017-03-17 RX ADMIN — Medication 0.5 MILLIGRAM(S): at 05:14

## 2017-03-17 RX ADMIN — Medication 3 MILLILITER(S): at 11:41

## 2017-03-17 RX ADMIN — PANTOPRAZOLE SODIUM 40 MILLIGRAM(S): 20 TABLET, DELAYED RELEASE ORAL at 05:16

## 2017-03-17 RX ADMIN — LOSARTAN POTASSIUM 75 MILLIGRAM(S): 100 TABLET, FILM COATED ORAL at 05:15

## 2017-03-17 RX ADMIN — GABAPENTIN 200 MILLIGRAM(S): 400 CAPSULE ORAL at 05:15

## 2017-03-17 RX ADMIN — Medication 3 MILLILITER(S): at 05:14

## 2017-03-17 RX ADMIN — Medication 4 MILLIGRAM(S): at 12:50

## 2017-04-25 ENCOUNTER — APPOINTMENT (OUTPATIENT)
Dept: GASTROENTEROLOGY | Facility: CLINIC | Age: 82
End: 2017-04-25

## 2017-04-25 VITALS
SYSTOLIC BLOOD PRESSURE: 154 MMHG | HEIGHT: 68 IN | TEMPERATURE: 97.5 F | WEIGHT: 213 LBS | DIASTOLIC BLOOD PRESSURE: 78 MMHG | OXYGEN SATURATION: 94 % | RESPIRATION RATE: 16 BRPM | HEART RATE: 95 BPM | BODY MASS INDEX: 32.28 KG/M2

## 2017-04-25 DIAGNOSIS — Z98.890 OTHER SPECIFIED POSTPROCEDURAL STATES: ICD-10-CM

## 2017-04-25 DIAGNOSIS — Z86.718 PERSONAL HISTORY OF OTHER VENOUS THROMBOSIS AND EMBOLISM: ICD-10-CM

## 2017-04-25 DIAGNOSIS — K80.50 CALCULUS OF BILE DUCT W/OUT CHOLANGITIS OR CHOLECYSTITIS W/OUT OBSTRUCTION: ICD-10-CM

## 2017-04-25 DIAGNOSIS — Z46.89 ENCOUNTER FOR FITTING AND ADJUSTMENT OF OTHER SPECIFIED DEVICES: ICD-10-CM

## 2017-04-29 PROBLEM — Z86.718 HISTORY OF DEEP VENOUS THROMBOSIS: Status: RESOLVED | Noted: 2017-04-29 | Resolved: 2017-04-29

## 2017-04-29 RX ORDER — INSULIN GLARGINE 100 [IU]/ML
100 INJECTION, SOLUTION SUBCUTANEOUS
Refills: 0 | Status: ACTIVE | COMMUNITY
Start: 2017-04-29

## 2017-04-29 RX ORDER — ROSUVASTATIN CALCIUM 10 MG/1
10 TABLET, FILM COATED ORAL
Refills: 0 | Status: ACTIVE | COMMUNITY
Start: 2017-04-29

## 2017-04-29 RX ORDER — TAMSULOSIN HYDROCHLORIDE 0.4 MG/1
0.4 CAPSULE ORAL
Refills: 0 | Status: ACTIVE | COMMUNITY
Start: 2017-04-29

## 2017-04-29 RX ORDER — MONTELUKAST SODIUM 10 MG/1
10 TABLET, FILM COATED ORAL
Refills: 0 | Status: ACTIVE | COMMUNITY
Start: 2017-04-29

## 2017-04-29 RX ORDER — FUROSEMIDE 20 MG/1
20 TABLET ORAL DAILY
Refills: 0 | Status: ACTIVE | COMMUNITY
Start: 2017-04-29

## 2017-04-29 RX ORDER — WARFARIN SODIUM 5 MG/1
5 TABLET ORAL
Refills: 0 | Status: ACTIVE | COMMUNITY
Start: 2017-04-29

## 2017-05-17 ENCOUNTER — OUTPATIENT (OUTPATIENT)
Dept: OUTPATIENT SERVICES | Facility: HOSPITAL | Age: 82
LOS: 1 days | End: 2017-05-17

## 2017-05-17 VITALS
TEMPERATURE: 98 F | WEIGHT: 209 LBS | SYSTOLIC BLOOD PRESSURE: 128 MMHG | OXYGEN SATURATION: 96 % | HEART RATE: 89 BPM | DIASTOLIC BLOOD PRESSURE: 83 MMHG | RESPIRATION RATE: 21 BRPM | HEIGHT: 68 IN

## 2017-05-17 DIAGNOSIS — Z46.89 ENCOUNTER FOR FITTING AND ADJUSTMENT OF OTHER SPECIFIED DEVICES: ICD-10-CM

## 2017-05-17 DIAGNOSIS — Z98.890 OTHER SPECIFIED POSTPROCEDURAL STATES: Chronic | ICD-10-CM

## 2017-05-17 DIAGNOSIS — Z98.890 OTHER SPECIFIED POSTPROCEDURAL STATES: ICD-10-CM

## 2017-05-17 DIAGNOSIS — E11.9 TYPE 2 DIABETES MELLITUS WITHOUT COMPLICATIONS: ICD-10-CM

## 2017-05-17 DIAGNOSIS — K80.50 CALCULUS OF BILE DUCT WITHOUT CHOLANGITIS OR CHOLECYSTITIS WITHOUT OBSTRUCTION: ICD-10-CM

## 2017-05-17 DIAGNOSIS — J44.9 CHRONIC OBSTRUCTIVE PULMONARY DISEASE, UNSPECIFIED: ICD-10-CM

## 2017-05-17 DIAGNOSIS — Z98.49 CATARACT EXTRACTION STATUS, UNSPECIFIED EYE: Chronic | ICD-10-CM

## 2017-05-17 DIAGNOSIS — I82.409 ACUTE EMBOLISM AND THROMBOSIS OF UNSPECIFIED DEEP VEINS OF UNSPECIFIED LOWER EXTREMITY: ICD-10-CM

## 2017-05-17 RX ORDER — GLYBURIDE 5 MG
1 TABLET ORAL
Qty: 0 | Refills: 0 | COMMUNITY

## 2017-05-17 RX ORDER — FUROSEMIDE 40 MG
2 TABLET ORAL
Qty: 0 | Refills: 0 | COMMUNITY

## 2017-05-17 NOTE — H&P PST ADULT - PMH
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

## 2017-05-17 NOTE — H&P PST ADULT - NEGATIVE GASTROINTESTINAL SYMPTOMS
no diarrhea/no vomiting/no flatulence/no melena/no constipation/no jaundice/no nausea/no change in bowel habits/no abdominal pain

## 2017-05-17 NOTE — H&P PST ADULT - HISTORY OF PRESENT ILLNESS
81 y/o  M w/c bound accompanied by daughter with PMHx of  HTN, HLD, CAD, T2DM, CHF (EF 40%) CHF exacerbation in 1/2017, COPD , and hx of DVT 17 years ago on Coumadin, s/p recent hospitalization in 3/14/207 for cholangitis s/p ERCP in 3/14/2017 .Now coming in PST for F/U  ERCP on 5/24/2017.    Pt denies any CP, Palpitations ,Nausea vomiting abdominal pain ,fever or chills

## 2017-05-17 NOTE — H&P PST ADULT - RS GEN PE MLT RESP DETAILS PC
normal/clear to auscultation bilaterally/breath sounds equal/good air movement/respirations non-labored/airway patent

## 2017-05-17 NOTE — H&P PST ADULT - PROBLEM SELECTOR PLAN 2
Pt will stop coumadin 5 days before the procedure as per Dr Rowe  Pt will confirm with Dr Patel cardiologist for the coumadin plan ,  PT/INR q 2 weeks pt will bring the results the DOS

## 2017-05-17 NOTE — H&P PST ADULT - NSANTHOSAYNRD_GEN_A_CORE
No. ZACKARY screening performed.  STOP BANG Legend: 0-2 = LOW Risk; 3-4 = INTERMEDIATE Risk; 5-8 = HIGH Risk

## 2017-05-22 NOTE — DISCHARGE NOTE ADULT - PATIENT PORTAL LINK FT
“You can access the Follow[facility] Patient Portal, offered by [facility], by registering with the following website: [email or web address]” normal...

## 2017-05-24 ENCOUNTER — OUTPATIENT (OUTPATIENT)
Dept: OUTPATIENT SERVICES | Facility: HOSPITAL | Age: 82
LOS: 1 days | End: 2017-05-24
Payer: MEDICARE

## 2017-05-24 ENCOUNTER — APPOINTMENT (OUTPATIENT)
Dept: GASTROENTEROLOGY | Facility: HOSPITAL | Age: 82
End: 2017-05-24

## 2017-05-24 DIAGNOSIS — R10.9 UNSPECIFIED ABDOMINAL PAIN: ICD-10-CM

## 2017-05-24 DIAGNOSIS — Z98.890 OTHER SPECIFIED POSTPROCEDURAL STATES: Chronic | ICD-10-CM

## 2017-05-24 DIAGNOSIS — Z98.49 CATARACT EXTRACTION STATUS, UNSPECIFIED EYE: Chronic | ICD-10-CM

## 2017-05-24 PROCEDURE — C1769: CPT

## 2017-05-24 PROCEDURE — 43264 ERCP REMOVE DUCT CALCULI: CPT

## 2017-05-24 PROCEDURE — 43264 ERCP REMOVE DUCT CALCULI: CPT | Mod: GC

## 2017-05-24 PROCEDURE — 74330 X-RAY BILE/PANC ENDOSCOPY: CPT

## 2017-05-24 PROCEDURE — 43262 ENDO CHOLANGIOPANCREATOGRAPH: CPT

## 2017-05-24 PROCEDURE — 74328 X-RAY BILE DUCT ENDOSCOPY: CPT | Mod: 26,GC

## 2017-05-24 PROCEDURE — 43275 ERCP REMOVE FORGN BODY DUCT: CPT

## 2017-05-24 PROCEDURE — 43262 ENDO CHOLANGIOPANCREATOGRAPH: CPT | Mod: 59,GC

## 2017-05-24 PROCEDURE — 43275 ERCP REMOVE FORGN BODY DUCT: CPT | Mod: 59,GC

## 2017-08-01 ENCOUNTER — APPOINTMENT (OUTPATIENT)
Dept: GASTROENTEROLOGY | Facility: CLINIC | Age: 82
End: 2017-08-01

## 2017-12-20 NOTE — PHYSICAL THERAPY INITIAL EVALUATION ADULT - RISK REDUCTION/PREVENTION, PT EVAL
"Oncology Rooming Note    December 20, 2017 11:20 AM   Roc Parkinson is a 89 year old male who presents for:    Chief Complaint   Patient presents with     Oncology Clinic Visit     MDS (myelodysplastic syndrome) (H)     Initial Vitals: BP (P) 120/60  Pulse (P) 80  Temp (P) 98  F (36.7  C) (Tympanic)  Resp (P) 16  Wt (P) 92.4 kg (203 lb 9.6 oz)  BMI (P) 29.21 kg/m2 Estimated body mass index is 29.21 kg/(m^2) (pended) as calculated from the following:    Height as of 12/1/17: 1.778 m (5' 10\").    Weight as of this encounter: (P) 92.4 kg (203 lb 9.6 oz). Body surface area is 2.14 meters squared (pended).  Data Unavailable Comment: Data Unavailable   No LMP for male patient.  Allergies reviewed: Yes  Medications reviewed: Yes    Medications: Medication refills not needed today.  Pharmacy name entered into [facility]:    [facility] [id number] [place] [address] [address] AT [facility] [address] [place] [address] PHARMACY [place], [place] - [address]  [facility] [place], [place] - [address]    Clinical concerns: no    5 minutes for nursing intake (face to face time)       Winsome Diaz MA              " risk factors

## 2018-04-25 ENCOUNTER — RX RENEWAL (OUTPATIENT)
Age: 83
End: 2018-04-25

## 2018-08-07 PROBLEM — I50.9 HEART FAILURE, UNSPECIFIED: Chronic | Status: ACTIVE | Noted: 2017-05-17

## 2018-08-07 PROBLEM — N40.0 BENIGN PROSTATIC HYPERPLASIA WITHOUT LOWER URINARY TRACT SYMPTOMS: Chronic | Status: ACTIVE | Noted: 2017-05-17

## 2018-08-07 PROBLEM — K80.50 CALCULUS OF BILE DUCT WITHOUT CHOLANGITIS OR CHOLECYSTITIS WITHOUT OBSTRUCTION: Chronic | Status: ACTIVE | Noted: 2017-05-17

## 2018-08-07 PROBLEM — K21.9 GASTRO-ESOPHAGEAL REFLUX DISEASE WITHOUT ESOPHAGITIS: Chronic | Status: ACTIVE | Noted: 2017-05-17

## 2018-08-07 PROBLEM — K46.9 UNSPECIFIED ABDOMINAL HERNIA WITHOUT OBSTRUCTION OR GANGRENE: Chronic | Status: ACTIVE | Noted: 2017-05-17

## 2018-08-09 ENCOUNTER — APPOINTMENT (OUTPATIENT)
Dept: CARDIOLOGY | Facility: CLINIC | Age: 83
End: 2018-08-09
Payer: MEDICARE

## 2018-08-09 VITALS
DIASTOLIC BLOOD PRESSURE: 84 MMHG | HEIGHT: 68 IN | HEART RATE: 80 BPM | WEIGHT: 224 LBS | BODY MASS INDEX: 33.95 KG/M2 | SYSTOLIC BLOOD PRESSURE: 136 MMHG | RESPIRATION RATE: 15 BRPM

## 2018-08-09 PROCEDURE — 93000 ELECTROCARDIOGRAM COMPLETE: CPT

## 2018-08-09 PROCEDURE — 99214 OFFICE O/P EST MOD 30 MIN: CPT

## 2018-09-17 ENCOUNTER — RX RENEWAL (OUTPATIENT)
Age: 83
End: 2018-09-17

## 2018-11-27 NOTE — PHYSICAL THERAPY INITIAL EVALUATION ADULT - ONSET DATE, REHAB EVAL
Problem: Falls - Risk of: Intervention: Assess risk factors for falls  Patient is medium fall risk      Problem: Gas Exchange - Impaired:  Intervention: Administer oxygen therapy  Patient wears 2 liters oxygen via nasal cannula at night as needed . 10-Mar-2017

## 2019-02-21 ENCOUNTER — APPOINTMENT (OUTPATIENT)
Dept: CARDIOLOGY | Facility: CLINIC | Age: 84
End: 2019-02-21
Payer: MEDICARE

## 2019-02-21 PROCEDURE — 93000 ELECTROCARDIOGRAM COMPLETE: CPT

## 2019-02-21 PROCEDURE — 99214 OFFICE O/P EST MOD 30 MIN: CPT

## 2019-02-22 VITALS
BODY MASS INDEX: 32.58 KG/M2 | WEIGHT: 220 LBS | HEART RATE: 90 BPM | DIASTOLIC BLOOD PRESSURE: 85 MMHG | RESPIRATION RATE: 15 BRPM | SYSTOLIC BLOOD PRESSURE: 134 MMHG | HEIGHT: 69 IN

## 2019-02-25 ENCOUNTER — CLINICAL ADVICE (OUTPATIENT)
Age: 84
End: 2019-02-25

## 2019-02-25 RX ORDER — OLMESARTAN MEDOXOMIL 40 MG/1
40 TABLET, FILM COATED ORAL DAILY
Qty: 90 | Refills: 0 | Status: DISCONTINUED | COMMUNITY
End: 2019-02-25

## 2019-02-25 RX ORDER — OLMESARTAN MEDOXOMIL 40 MG/1
40 TABLET, FILM COATED ORAL
Qty: 90 | Refills: 0 | Status: DISCONTINUED | COMMUNITY
Start: 2018-04-25 | End: 2019-02-25

## 2019-03-19 DIAGNOSIS — R10.13 EPIGASTRIC PAIN: ICD-10-CM

## 2019-03-19 RX ORDER — GABAPENTIN 100 MG/1
100 CAPSULE ORAL
Refills: 0 | Status: COMPLETED | COMMUNITY
End: 2019-03-19

## 2019-03-19 RX ORDER — OMEPRAZOLE 40 MG/1
40 CAPSULE, DELAYED RELEASE ORAL
Qty: 90 | Refills: 1 | Status: ACTIVE | COMMUNITY

## 2019-03-19 RX ORDER — METFORMIN HYDROCHLORIDE 500 MG/1
500 TABLET, COATED ORAL DAILY
Refills: 0 | Status: ACTIVE | COMMUNITY

## 2019-03-19 RX ORDER — ASPIRIN 81 MG/1
81 TABLET ORAL
Refills: 0 | Status: ACTIVE | COMMUNITY

## 2019-03-19 RX ORDER — POTASSIUM CHLORIDE 750 MG/1
10 CAPSULE, EXTENDED RELEASE ORAL
Qty: 90 | Refills: 0 | Status: ACTIVE | COMMUNITY
Start: 2019-03-19

## 2019-05-13 NOTE — ED ADULT NURSE NOTE - PMH
none
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

## 2019-05-20 ENCOUNTER — APPOINTMENT (OUTPATIENT)
Dept: CARDIOLOGY | Facility: CLINIC | Age: 84
End: 2019-05-20
Payer: MEDICARE

## 2019-05-20 DIAGNOSIS — R01.1 CARDIAC MURMUR, UNSPECIFIED: ICD-10-CM

## 2019-05-20 PROCEDURE — 93306 TTE W/DOPPLER COMPLETE: CPT

## 2019-05-20 PROCEDURE — 93880 EXTRACRANIAL BILAT STUDY: CPT

## 2019-05-30 PROBLEM — R01.1 HEART MURMUR: Status: ACTIVE | Noted: 2018-08-09

## 2019-06-09 ENCOUNTER — INPATIENT (INPATIENT)
Facility: HOSPITAL | Age: 84
LOS: 1 days | Discharge: HOME | DRG: 190 | End: 2019-06-11
Attending: INTERNAL MEDICINE | Admitting: HOSPITALIST
Payer: MEDICARE

## 2019-06-09 VITALS
RESPIRATION RATE: 18 BRPM | DIASTOLIC BLOOD PRESSURE: 103 MMHG | OXYGEN SATURATION: 92 % | HEART RATE: 104 BPM | HEIGHT: 69 IN | SYSTOLIC BLOOD PRESSURE: 187 MMHG | WEIGHT: 229.06 LBS | TEMPERATURE: 98 F

## 2019-06-09 DIAGNOSIS — Z98.890 OTHER SPECIFIED POSTPROCEDURAL STATES: Chronic | ICD-10-CM

## 2019-06-09 DIAGNOSIS — Z98.49 CATARACT EXTRACTION STATUS, UNSPECIFIED EYE: Chronic | ICD-10-CM

## 2019-06-09 LAB
APTT BLD: 40.5 SEC — HIGH (ref 27.5–36.3)
HCT VFR BLD CALC: 44.5 % — SIGNIFICANT CHANGE UP (ref 39–50)
HGB BLD-MCNC: 14.5 G/DL — SIGNIFICANT CHANGE UP (ref 13–17)
INR BLD: 2.06 RATIO — HIGH (ref 0.88–1.16)
MCHC RBC-ENTMCNC: 31.2 PG — SIGNIFICANT CHANGE UP (ref 27–34)
MCHC RBC-ENTMCNC: 32.7 GM/DL — SIGNIFICANT CHANGE UP (ref 32–36)
MCV RBC AUTO: 95.7 FL — SIGNIFICANT CHANGE UP (ref 80–100)
PLATELET # BLD AUTO: 157 K/UL — SIGNIFICANT CHANGE UP (ref 150–400)
PROTHROM AB SERPL-ACNC: 24 SEC — HIGH (ref 10–12.9)
RBC # BLD: 4.65 M/UL — SIGNIFICANT CHANGE UP (ref 4.2–5.8)
RBC # FLD: 12.4 % — SIGNIFICANT CHANGE UP (ref 10.3–14.5)
WBC # BLD: 11.4 K/UL — HIGH (ref 3.8–10.5)
WBC # FLD AUTO: 11.4 K/UL — HIGH (ref 3.8–10.5)

## 2019-06-09 PROCEDURE — 71045 X-RAY EXAM CHEST 1 VIEW: CPT | Mod: 26

## 2019-06-09 PROCEDURE — 41250 REPAIR TONGUE LACERATION: CPT

## 2019-06-09 PROCEDURE — 99285 EMERGENCY DEPT VISIT HI MDM: CPT | Mod: 25

## 2019-06-09 RX ORDER — ALBUTEROL 90 UG/1
2.5 AEROSOL, METERED ORAL ONCE
Refills: 0 | Status: DISCONTINUED | OUTPATIENT
Start: 2019-06-09 | End: 2019-06-09

## 2019-06-09 RX ORDER — PENICILLIN V POTASSIUM 250 MG
1 TABLET ORAL
Qty: 20 | Refills: 0
Start: 2019-06-09 | End: 2019-06-13

## 2019-06-09 RX ORDER — CHLORHEXIDINE GLUCONATE 213 G/1000ML
15 SOLUTION TOPICAL
Qty: 1 | Refills: 0
Start: 2019-06-09 | End: 2019-06-15

## 2019-06-09 RX ORDER — IPRATROPIUM/ALBUTEROL SULFATE 18-103MCG
3 AEROSOL WITH ADAPTER (GRAM) INHALATION ONCE
Refills: 0 | Status: COMPLETED | OUTPATIENT
Start: 2019-06-09 | End: 2019-06-09

## 2019-06-09 RX ORDER — TRANEXAMIC ACID 100 MG/ML
5 INJECTION, SOLUTION INTRAVENOUS ONCE
Refills: 0 | Status: COMPLETED | OUTPATIENT
Start: 2019-06-09 | End: 2019-06-09

## 2019-06-09 RX ORDER — PENICILLIN V POTASSIUM 250 MG
500 TABLET ORAL ONCE
Refills: 0 | Status: COMPLETED | OUTPATIENT
Start: 2019-06-09 | End: 2019-06-09

## 2019-06-09 RX ADMIN — Medication 3 MILLILITER(S): at 23:04

## 2019-06-09 RX ADMIN — Medication 500 MILLIGRAM(S): at 23:54

## 2019-06-09 RX ADMIN — Medication 3 MILLILITER(S): at 23:54

## 2019-06-09 RX ADMIN — Medication 125 MILLIGRAM(S): at 23:04

## 2019-06-09 RX ADMIN — TRANEXAMIC ACID 5 MILLILITER(S): 100 INJECTION, SOLUTION INTRAVENOUS at 22:57

## 2019-06-09 RX ADMIN — Medication 3 MILLILITER(S): at 22:23

## 2019-06-09 NOTE — ED PROVIDER NOTE - OBJECTIVE STATEMENT
84 M w afib on coumadin, chf, copd presents w 3 hour onset of tongue bleeding after biting injury. He is not feeling light headed. He tried to put ice on the tongue to stop the bleeding which did not work. He was not able to get his usual nebulizer treatment at night today because of the tongue bleed. 84 M w afib on coumadin, chf, copd presents w 3 hour onset of tongue bleeding after biting injury. He is not feeling light headed. He tried to put ice on the tongue to stop the bleeding which did not work for past 3 hours. He was not able to get his usual nebulizer treatment at night today because of the tongue bleed. Patient has had URI symptoms for past week including running nose, cough and copd symptoms have been slightly more exacerbated as result.

## 2019-06-09 NOTE — ED PROVIDER NOTE - CLINICAL SUMMARY MEDICAL DECISION MAKING FREE TEXT BOX
Dc: Patient with tongue laceration after biting tongue, continued bleeding on coumadin for past few hours. will attempt lidocaine, txa. Dc: Patient with tongue laceration after biting tongue, continued bleeding on coumadin for past few hours. will attempt lidocaine, txa. patient not able to take regular duonebs at home because of bleeding in mouth. currently wheezing and mildly tachypneic. will get cxr, labs, steroids, duonebs, reassess.

## 2019-06-09 NOTE — ED ADULT NURSE NOTE - NSIMPLEMENTINTERV_GEN_ALL_ED
Implemented All Fall with Harm Risk Interventions:  [place] to call system. Call bell, personal items and telephone within reach. Instruct patient to call for assistance. Room bathroom lighting operational. Non-slip footwear when patient is off stretcher. Physically safe environment: no spills, clutter or unnecessary equipment. Stretcher in lowest position, wheels locked, appropriate side rails in place. Provide visual cue, wrist band, yellow gown, etc. Monitor gait and stability. Monitor for mental status changes and reorient to person, place, and time. Review medications for side effects contributing to fall risk. Reinforce activity limits and safety measures with patient and family. Provide visual clues: red socks.

## 2019-06-09 NOTE — ED PROVIDER NOTE - NSFOLLOWUPCLINICS_GEN_ALL_ED_FT
[facility] Pulmonolgy and Sleep Medicine  Pulmonology  [address], [facility] [address]  [place] [address]  Phone: [phone]  Fax:     [facility] - ENT  Otolaryngology (ENT)  [address] [place] [address]  Phone: [phone]  Fax:   Follow Up Time:

## 2019-06-09 NOTE — ED PROVIDER NOTE - PROGRESS NOTE DETAILS
tongue bleeding controlled with suture. Patient receivng duonebsa nd steroids. Patient wheezing improving and less tachypneic. will give oral abx, peridex, steroids. Attempted to discharge patient and saturations in 87%. will give additional albuterol treatments. Patient does not want to be admitted at this time. will reassess. Attempted to discharge patient and saturations in 87%. will give additional albuterol treatments. Patient does not want to be admitted at this time. will send blood work, will reassess. sign out to night team to reassess for admisison. tongue bleeding controlled with suture. Patient receiving duonebsa nd steroids. Attempted to discharge patient and saturations in 87%. will give additional albuterol treatments. Patient does not want to be admitted at this time. will send blood work, will reassess. sign out to night team to reassess for admission.

## 2019-06-09 NOTE — ED ADULT NURSE NOTE - OBJECTIVE STATEMENT
84M to ED s/p biting tongue. Pt is on coumadin and is bleeding. Patient is alert and oriented x3, calm/cooperative. Pt has 20 ga placed to RAC in ED. Family at bedside. 84M to ED s/p biting tongue. Pt is on coumadin and is bleeding. Patient is alert and oriented x3, calm/cooperative. Pt has 20 ga placed to RAC in ED. Family at bedside. BS rhonchi to auscultation.

## 2019-06-09 NOTE — ED PROVIDER NOTE - CARE PLAN
Principal Discharge DX:	Laceration of tongue, initial encounter  Secondary Diagnosis:	COPD with acute exacerbation

## 2019-06-09 NOTE — ED PROVIDER NOTE - NSFOLLOWUPINSTRUCTIONS_ED_ALL_ED_FT
Take antibiotics four times a day for 5 days as prescribed.   Use peridex solution 4 times a day to rinse mouth after food and before bedtime for one week.   The sutures placed in your tongue will fall out on their own.   Use prednisone as prescribed for the next four days once a day.   Use your nebulizer every 4 hours for wheezing.   Return to the ER immediately for worsening symptoms such as tongue swelling, pus from the wound, fevers, difficulty breathing.

## 2019-06-09 NOTE — ED PROVIDER NOTE - PMH
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

## 2019-06-10 DIAGNOSIS — S01.512A LACERATION WITHOUT FOREIGN BODY OF ORAL CAVITY, INITIAL ENCOUNTER: ICD-10-CM

## 2019-06-10 DIAGNOSIS — I48.2 CHRONIC ATRIAL FIBRILLATION: ICD-10-CM

## 2019-06-10 DIAGNOSIS — B34.1 ENTEROVIRUS INFECTION, UNSPECIFIED: ICD-10-CM

## 2019-06-10 DIAGNOSIS — I10 ESSENTIAL (PRIMARY) HYPERTENSION: ICD-10-CM

## 2019-06-10 DIAGNOSIS — J44.1 CHRONIC OBSTRUCTIVE PULMONARY DISEASE WITH (ACUTE) EXACERBATION: ICD-10-CM

## 2019-06-10 DIAGNOSIS — Z29.9 ENCOUNTER FOR PROPHYLACTIC MEASURES, UNSPECIFIED: ICD-10-CM

## 2019-06-10 DIAGNOSIS — E11.8 TYPE 2 DIABETES MELLITUS WITH UNSPECIFIED COMPLICATIONS: ICD-10-CM

## 2019-06-10 DIAGNOSIS — I63.9 CEREBRAL INFARCTION, UNSPECIFIED: ICD-10-CM

## 2019-06-10 DIAGNOSIS — I50.42 CHRONIC COMBINED SYSTOLIC (CONGESTIVE) AND DIASTOLIC (CONGESTIVE) HEART FAILURE: ICD-10-CM

## 2019-06-10 LAB
ALBUMIN SERPL ELPH-MCNC: 3.4 G/DL — SIGNIFICANT CHANGE UP (ref 3.3–5)
ALP SERPL-CCNC: 70 U/L — SIGNIFICANT CHANGE UP (ref 40–120)
ALT FLD-CCNC: 14 U/L — SIGNIFICANT CHANGE UP (ref 10–45)
ANION GAP SERPL CALC-SCNC: 13 MMOL/L — SIGNIFICANT CHANGE UP (ref 5–17)
AST SERPL-CCNC: 14 U/L — SIGNIFICANT CHANGE UP (ref 10–40)
BILIRUB SERPL-MCNC: 0.6 MG/DL — SIGNIFICANT CHANGE UP (ref 0.2–1.2)
BUN SERPL-MCNC: 26 MG/DL — HIGH (ref 7–23)
CALCIUM SERPL-MCNC: 8.7 MG/DL — SIGNIFICANT CHANGE UP (ref 8.4–10.5)
CHLORIDE SERPL-SCNC: 103 MMOL/L — SIGNIFICANT CHANGE UP (ref 96–108)
CO2 SERPL-SCNC: 25 MMOL/L — SIGNIFICANT CHANGE UP (ref 22–31)
CREAT SERPL-MCNC: 1.5 MG/DL — HIGH (ref 0.5–1.3)
GLUCOSE BLDC GLUCOMTR-MCNC: 251 MG/DL — HIGH (ref 70–99)
GLUCOSE BLDC GLUCOMTR-MCNC: 273 MG/DL — HIGH (ref 70–99)
GLUCOSE BLDC GLUCOMTR-MCNC: 371 MG/DL — HIGH (ref 70–99)
GLUCOSE BLDC GLUCOMTR-MCNC: 398 MG/DL — HIGH (ref 70–99)
GLUCOSE BLDC GLUCOMTR-MCNC: 463 MG/DL — CRITICAL HIGH (ref 70–99)
GLUCOSE BLDC GLUCOMTR-MCNC: 476 MG/DL — CRITICAL HIGH (ref 70–99)
GLUCOSE SERPL-MCNC: 237 MG/DL — HIGH (ref 70–99)
NT-PROBNP SERPL-SCNC: 389 PG/ML — HIGH (ref 0–300)
POTASSIUM SERPL-MCNC: 3.5 MMOL/L — SIGNIFICANT CHANGE UP (ref 3.5–5.3)
POTASSIUM SERPL-SCNC: 3.5 MMOL/L — SIGNIFICANT CHANGE UP (ref 3.5–5.3)
PROT SERPL-MCNC: 6.4 G/DL — SIGNIFICANT CHANGE UP (ref 6–8.3)
RAPID RVP RESULT: DETECTED
RV+EV RNA SPEC QL NAA+PROBE: DETECTED
SODIUM SERPL-SCNC: 141 MMOL/L — SIGNIFICANT CHANGE UP (ref 135–145)

## 2019-06-10 PROCEDURE — 99223 1ST HOSP IP/OBS HIGH 75: CPT

## 2019-06-10 RX ORDER — INSULIN GLARGINE 100 [IU]/ML
15 INJECTION, SOLUTION SUBCUTANEOUS AT BEDTIME
Refills: 0 | Status: DISCONTINUED | OUTPATIENT
Start: 2019-06-10 | End: 2019-06-11

## 2019-06-10 RX ORDER — IPRATROPIUM/ALBUTEROL SULFATE 18-103MCG
3 AEROSOL WITH ADAPTER (GRAM) INHALATION EVERY 6 HOURS
Refills: 0 | Status: DISCONTINUED | OUTPATIENT
Start: 2019-06-10 | End: 2019-06-11

## 2019-06-10 RX ORDER — TAMSULOSIN HYDROCHLORIDE 0.4 MG/1
0.4 CAPSULE ORAL AT BEDTIME
Refills: 0 | Status: DISCONTINUED | OUTPATIENT
Start: 2019-06-10 | End: 2019-06-11

## 2019-06-10 RX ORDER — WARFARIN SODIUM 2.5 MG/1
5 TABLET ORAL DAILY
Refills: 0 | Status: DISCONTINUED | OUTPATIENT
Start: 2019-06-10 | End: 2019-06-10

## 2019-06-10 RX ORDER — DEXTROSE 50 % IN WATER 50 %
12.5 SYRINGE (ML) INTRAVENOUS ONCE
Refills: 0 | Status: DISCONTINUED | OUTPATIENT
Start: 2019-06-10 | End: 2019-06-11

## 2019-06-10 RX ORDER — SODIUM CHLORIDE 9 MG/ML
1000 INJECTION, SOLUTION INTRAVENOUS
Refills: 0 | Status: DISCONTINUED | OUTPATIENT
Start: 2019-06-10 | End: 2019-06-11

## 2019-06-10 RX ORDER — GLYBURIDE 5 MG
1 TABLET ORAL
Qty: 0 | Refills: 0 | DISCHARGE

## 2019-06-10 RX ORDER — WARFARIN SODIUM 2.5 MG/1
5 TABLET ORAL ONCE
Refills: 0 | Status: COMPLETED | OUTPATIENT
Start: 2019-06-10 | End: 2019-06-10

## 2019-06-10 RX ORDER — ACETAMINOPHEN 500 MG
1000 TABLET ORAL EVERY 6 HOURS
Refills: 0 | Status: DISCONTINUED | OUTPATIENT
Start: 2019-06-10 | End: 2019-06-11

## 2019-06-10 RX ORDER — OLMESARTAN MEDOXOMIL 5 MG/1
1 TABLET, FILM COATED ORAL
Qty: 0 | Refills: 0 | DISCHARGE

## 2019-06-10 RX ORDER — BUDESONIDE, MICRONIZED 100 %
0.5 POWDER (GRAM) MISCELLANEOUS
Refills: 0 | Status: DISCONTINUED | OUTPATIENT
Start: 2019-06-10 | End: 2019-06-11

## 2019-06-10 RX ORDER — INSULIN LISPRO 100/ML
VIAL (ML) SUBCUTANEOUS
Refills: 0 | Status: DISCONTINUED | OUTPATIENT
Start: 2019-06-10 | End: 2019-06-11

## 2019-06-10 RX ORDER — ATORVASTATIN CALCIUM 80 MG/1
40 TABLET, FILM COATED ORAL AT BEDTIME
Refills: 0 | Status: DISCONTINUED | OUTPATIENT
Start: 2019-06-10 | End: 2019-06-11

## 2019-06-10 RX ORDER — FINASTERIDE 5 MG/1
5 TABLET, FILM COATED ORAL DAILY
Refills: 0 | Status: DISCONTINUED | OUTPATIENT
Start: 2019-06-10 | End: 2019-06-11

## 2019-06-10 RX ORDER — GLUCAGON INJECTION, SOLUTION 0.5 MG/.1ML
1 INJECTION, SOLUTION SUBCUTANEOUS ONCE
Refills: 0 | Status: DISCONTINUED | OUTPATIENT
Start: 2019-06-10 | End: 2019-06-11

## 2019-06-10 RX ORDER — FUROSEMIDE 40 MG
20 TABLET ORAL
Refills: 0 | Status: DISCONTINUED | OUTPATIENT
Start: 2019-06-10 | End: 2019-06-11

## 2019-06-10 RX ORDER — DEXTROSE 50 % IN WATER 50 %
15 SYRINGE (ML) INTRAVENOUS ONCE
Refills: 0 | Status: DISCONTINUED | OUTPATIENT
Start: 2019-06-10 | End: 2019-06-11

## 2019-06-10 RX ORDER — ALBUTEROL 90 UG/1
1 AEROSOL, METERED ORAL EVERY 4 HOURS
Refills: 0 | Status: DISCONTINUED | OUTPATIENT
Start: 2019-06-10 | End: 2019-06-11

## 2019-06-10 RX ORDER — DEXTROSE 50 % IN WATER 50 %
25 SYRINGE (ML) INTRAVENOUS ONCE
Refills: 0 | Status: DISCONTINUED | OUTPATIENT
Start: 2019-06-10 | End: 2019-06-11

## 2019-06-10 RX ORDER — INSULIN LISPRO 100/ML
VIAL (ML) SUBCUTANEOUS AT BEDTIME
Refills: 0 | Status: DISCONTINUED | OUTPATIENT
Start: 2019-06-10 | End: 2019-06-11

## 2019-06-10 RX ORDER — ASPIRIN/CALCIUM CARB/MAGNESIUM 324 MG
81 TABLET ORAL DAILY
Refills: 0 | Status: DISCONTINUED | OUTPATIENT
Start: 2019-06-10 | End: 2019-06-11

## 2019-06-10 RX ORDER — INSULIN LISPRO 100/ML
10 VIAL (ML) SUBCUTANEOUS ONCE
Refills: 0 | Status: COMPLETED | OUTPATIENT
Start: 2019-06-10 | End: 2019-06-10

## 2019-06-10 RX ORDER — LOSARTAN POTASSIUM 100 MG/1
50 TABLET, FILM COATED ORAL DAILY
Refills: 0 | Status: DISCONTINUED | OUTPATIENT
Start: 2019-06-10 | End: 2019-06-11

## 2019-06-10 RX ADMIN — Medication 3 MILLILITER(S): at 22:46

## 2019-06-10 RX ADMIN — TAMSULOSIN HYDROCHLORIDE 0.4 MILLIGRAM(S): 0.4 CAPSULE ORAL at 22:22

## 2019-06-10 RX ADMIN — ATORVASTATIN CALCIUM 40 MILLIGRAM(S): 80 TABLET, FILM COATED ORAL at 22:22

## 2019-06-10 RX ADMIN — Medication 3: at 22:38

## 2019-06-10 RX ADMIN — FINASTERIDE 5 MILLIGRAM(S): 5 TABLET, FILM COATED ORAL at 13:03

## 2019-06-10 RX ADMIN — LOSARTAN POTASSIUM 50 MILLIGRAM(S): 100 TABLET, FILM COATED ORAL at 13:03

## 2019-06-10 RX ADMIN — Medication 81 MILLIGRAM(S): at 13:03

## 2019-06-10 RX ADMIN — Medication 5: at 18:11

## 2019-06-10 RX ADMIN — Medication 20 MILLIGRAM(S): at 18:10

## 2019-06-10 RX ADMIN — Medication 3 MILLILITER(S): at 18:10

## 2019-06-10 RX ADMIN — Medication 3: at 09:29

## 2019-06-10 RX ADMIN — Medication 0.5 MILLIGRAM(S): at 22:21

## 2019-06-10 RX ADMIN — INSULIN GLARGINE 15 UNIT(S): 100 INJECTION, SOLUTION SUBCUTANEOUS at 22:39

## 2019-06-10 RX ADMIN — Medication 10 UNIT(S): at 13:04

## 2019-06-10 RX ADMIN — WARFARIN SODIUM 5 MILLIGRAM(S): 2.5 TABLET ORAL at 22:22

## 2019-06-10 RX ADMIN — Medication 3 MILLILITER(S): at 11:46

## 2019-06-10 NOTE — H&P ADULT - PROBLEM SELECTOR PLAN 1
-Secondary to acute Enterovirus infections as well as unable to take scheduled inhaler treatment  -Physical exam remarkable for diffuse wheezing with desaturation <90% on RA  -S/p Solumedrol 125mg IV once with DuoNeb x 3 treatment in the ED  -Continue DuoNeb Q6hr ATC with Albuterol Q4hr prn  -Prednisone 50mg daily x 5 days  -Monitor O2 sats

## 2019-06-10 NOTE — H&P ADULT - PROBLEM SELECTOR PLAN 4
-ECHO 2017 EF 40% with Systolic/Diastolic dysfunction  -Euvolemic  -Continue home medication -ECHO 2017 EF 40% with Systolic/Diastolic dysfunction  -Euvolemic  -Continue lasix 20mg BID   -Monitor renal function

## 2019-06-10 NOTE — H&P ADULT - NSICDXPASTSURGICALHX_GEN_ALL_CORE_FT
PAST SURGICAL HISTORY:  S/P cataract surgery, unspecified laterality     S/P ERCP 3/2017    S/P Hernia Repair

## 2019-06-10 NOTE — H&P ADULT - PROBLEM SELECTOR PLAN 7
-Chronic -Chronic  -Home Telmisartan switched to Losartan 50mg daily inpt  -Monitor vitals and renal function

## 2019-06-10 NOTE — H&P ADULT - NSICDXPASTMEDICALHX_GEN_ALL_CORE_FT
PAST MEDICAL HISTORY:  Atrial fibrillation     Calculus of bile duct without cholangitis or cholecystitis without obstruction     CHF (congestive heart failure) last exacerbation in 1/2017    Chronic Obstructive Pulmonary Disease (COPD)     CVA (Cerebral Vascular Accident) X 3 with left side weakness from  i st stroke in 17 yeras ago    Deep Vein Thrombosis (DVT) 17 yeras ago on Coumadin    Diabetes Mellitus     Enlarged prostate     GERD (gastroesophageal reflux disease)     Hernia umblical    Hypertension     Mycobacterium Avium-Intracellulare Infection 6/2009    Obstructive Sleep Apnea

## 2019-06-10 NOTE — H&P ADULT - HISTORY OF PRESENT ILLNESS
83yo male with hx of A fib, COPD, CHF, HTN, DM II, presented to the ED with complaints of persistent tongue bleeding post biting injury. Pt states while eating/chewing, he bit his tongue leading it to start bleeding. Unsuccessful at controlling the bleed with pressure and ice x 3hrs. Due to persistent bleeding, pt came to the ED. He denied symptoms of choking, light headed, dizziness.  Pt states he developed symptoms of sob and dyspnea for the past few days. He endorses his wife have flu like symptoms a week ago after which he developed similar symptoms. He denied cp, palpitations, abd pain, N/V, fever, chills, coughing. Because of his tongue biting incident associated with uncontrolled bleeding, he was not able to take his scheduled inhalers.   In the ED pt was noted to desaturate in the 80s% on RA as well as diffuse wheezing for which he was provided IV steroids + DuoNeb treatment x 3.

## 2019-06-10 NOTE — H&P ADULT - PROBLEM SELECTOR PLAN 3
-S/p repair with sutures in the ED  -Bleeding controlled -S/p repair with sutures in the ED  -Bleeding controlled  -S/p PCN x 1 in the ED  -No indication for Abx at this time

## 2019-06-10 NOTE — H&P ADULT - ASSESSMENT
83yo male with hx of A fib, COPD, CHF, HTN, DM II, presented to the ED with complaints of persistent tongue bleeding post biting injury as well as sob/dyspnea secondary to COPD exacerbation with + Enterovirus.

## 2019-06-11 ENCOUNTER — TRANSCRIPTION ENCOUNTER (OUTPATIENT)
Age: 84
End: 2019-06-11

## 2019-06-11 VITALS
DIASTOLIC BLOOD PRESSURE: 69 MMHG | SYSTOLIC BLOOD PRESSURE: 157 MMHG | HEART RATE: 85 BPM | RESPIRATION RATE: 19 BRPM | TEMPERATURE: 98 F | OXYGEN SATURATION: 91 %

## 2019-06-11 LAB
ANION GAP SERPL CALC-SCNC: 13 MMOL/L — SIGNIFICANT CHANGE UP (ref 5–17)
APTT BLD: 34.9 SEC — SIGNIFICANT CHANGE UP (ref 27.5–36.3)
B-OH-BUTYR SERPL-SCNC: 0.1 MMOL/L — SIGNIFICANT CHANGE UP
BUN SERPL-MCNC: 29 MG/DL — HIGH (ref 7–23)
CALCIUM SERPL-MCNC: 9.4 MG/DL — SIGNIFICANT CHANGE UP (ref 8.4–10.5)
CHLORIDE SERPL-SCNC: 105 MMOL/L — SIGNIFICANT CHANGE UP (ref 96–108)
CO2 SERPL-SCNC: 26 MMOL/L — SIGNIFICANT CHANGE UP (ref 22–31)
CREAT SERPL-MCNC: 1.25 MG/DL — SIGNIFICANT CHANGE UP (ref 0.5–1.3)
GAS PNL BLDV: SIGNIFICANT CHANGE UP
GLUCOSE BLDC GLUCOMTR-MCNC: 233 MG/DL — HIGH (ref 70–99)
GLUCOSE BLDC GLUCOMTR-MCNC: 290 MG/DL — HIGH (ref 70–99)
GLUCOSE BLDC GLUCOMTR-MCNC: 436 MG/DL — HIGH (ref 70–99)
GLUCOSE SERPL-MCNC: 262 MG/DL — HIGH (ref 70–99)
HBA1C BLD-MCNC: 8.6 % — HIGH (ref 4–5.6)
INR BLD: 2.03 RATIO — HIGH (ref 0.88–1.16)
POTASSIUM SERPL-MCNC: 3.8 MMOL/L — SIGNIFICANT CHANGE UP (ref 3.5–5.3)
POTASSIUM SERPL-SCNC: 3.8 MMOL/L — SIGNIFICANT CHANGE UP (ref 3.5–5.3)
PROTHROM AB SERPL-ACNC: 23.9 SEC — HIGH (ref 10–12.9)
SODIUM SERPL-SCNC: 144 MMOL/L — SIGNIFICANT CHANGE UP (ref 135–145)

## 2019-06-11 PROCEDURE — 80053 COMPREHEN METABOLIC PANEL: CPT

## 2019-06-11 PROCEDURE — 83880 ASSAY OF NATRIURETIC PEPTIDE: CPT

## 2019-06-11 PROCEDURE — 82010 KETONE BODYS QUAN: CPT

## 2019-06-11 PROCEDURE — 85027 COMPLETE CBC AUTOMATED: CPT

## 2019-06-11 PROCEDURE — 84295 ASSAY OF SERUM SODIUM: CPT

## 2019-06-11 PROCEDURE — 99285 EMERGENCY DEPT VISIT HI MDM: CPT | Mod: 25

## 2019-06-11 PROCEDURE — 83605 ASSAY OF LACTIC ACID: CPT

## 2019-06-11 PROCEDURE — 82330 ASSAY OF CALCIUM: CPT

## 2019-06-11 PROCEDURE — 84132 ASSAY OF SERUM POTASSIUM: CPT

## 2019-06-11 PROCEDURE — 82947 ASSAY GLUCOSE BLOOD QUANT: CPT

## 2019-06-11 PROCEDURE — 93005 ELECTROCARDIOGRAM TRACING: CPT | Mod: XU

## 2019-06-11 PROCEDURE — 80048 BASIC METABOLIC PNL TOTAL CA: CPT

## 2019-06-11 PROCEDURE — 82803 BLOOD GASES ANY COMBINATION: CPT

## 2019-06-11 PROCEDURE — 85014 HEMATOCRIT: CPT

## 2019-06-11 PROCEDURE — 96374 THER/PROPH/DIAG INJ IV PUSH: CPT | Mod: XU

## 2019-06-11 PROCEDURE — 85610 PROTHROMBIN TIME: CPT

## 2019-06-11 PROCEDURE — 82435 ASSAY OF BLOOD CHLORIDE: CPT

## 2019-06-11 PROCEDURE — 71045 X-RAY EXAM CHEST 1 VIEW: CPT

## 2019-06-11 PROCEDURE — 82962 GLUCOSE BLOOD TEST: CPT

## 2019-06-11 PROCEDURE — 41250 REPAIR TONGUE LACERATION: CPT

## 2019-06-11 PROCEDURE — 87486 CHLMYD PNEUM DNA AMP PROBE: CPT

## 2019-06-11 PROCEDURE — 87633 RESP VIRUS 12-25 TARGETS: CPT

## 2019-06-11 PROCEDURE — 82565 ASSAY OF CREATININE: CPT

## 2019-06-11 PROCEDURE — 87581 M.PNEUMON DNA AMP PROBE: CPT

## 2019-06-11 PROCEDURE — 94640 AIRWAY INHALATION TREATMENT: CPT

## 2019-06-11 PROCEDURE — 99239 HOSP IP/OBS DSCHRG MGMT >30: CPT

## 2019-06-11 PROCEDURE — 87798 DETECT AGENT NOS DNA AMP: CPT

## 2019-06-11 PROCEDURE — 83036 HEMOGLOBIN GLYCOSYLATED A1C: CPT

## 2019-06-11 PROCEDURE — 85730 THROMBOPLASTIN TIME PARTIAL: CPT

## 2019-06-11 RX ORDER — FUROSEMIDE 40 MG
1 TABLET ORAL
Qty: 0 | Refills: 0 | DISCHARGE
Start: 2019-06-11

## 2019-06-11 RX ORDER — FUROSEMIDE 40 MG
1 TABLET ORAL
Qty: 0 | Refills: 0 | DISCHARGE

## 2019-06-11 RX ADMIN — Medication 3 MILLILITER(S): at 12:22

## 2019-06-11 RX ADMIN — Medication 6: at 13:29

## 2019-06-11 RX ADMIN — Medication 3 MILLILITER(S): at 05:23

## 2019-06-11 RX ADMIN — Medication 81 MILLIGRAM(S): at 12:22

## 2019-06-11 RX ADMIN — Medication 20 MILLIGRAM(S): at 05:23

## 2019-06-11 RX ADMIN — FINASTERIDE 5 MILLIGRAM(S): 5 TABLET, FILM COATED ORAL at 12:22

## 2019-06-11 RX ADMIN — Medication 0.5 MILLIGRAM(S): at 05:23

## 2019-06-11 RX ADMIN — Medication 3: at 09:31

## 2019-06-11 RX ADMIN — LOSARTAN POTASSIUM 50 MILLIGRAM(S): 100 TABLET, FILM COATED ORAL at 05:23

## 2019-06-11 RX ADMIN — Medication 50 MILLIGRAM(S): at 05:23

## 2019-06-11 NOTE — PROGRESS NOTE ADULT - PROBLEM SELECTOR PLAN 8
-fs uncontrolled, 2' steroid use   - note: patient received half of his home dose plus steroid use. hba1c of 8.6. he will resume his home dose of lantus tonight and will complete his prednisone in 3 days.  - he can f/u with outpatient primary doctor for management of his diabetes. - Currently, no signs of DKA  -resume Lantus 34 units QHS with PO meds

## 2019-06-11 NOTE — PROGRESS NOTE ADULT - ASSESSMENT
83yo male with hx of A fib, COPD, CHF, HTN, DM II, presented to the ED with complaints of persistent tongue bleeding post biting injury presents with tongue laceration s/p repair. Also, with acute respiratory failure with hypoxia 2' COPD exacerbation with + Enterovirus.

## 2019-06-11 NOTE — PROGRESS NOTE ADULT - SUBJECTIVE AND OBJECTIVE BOX
Patient denies any shortness of breath or wheezing. He is mildly dyspneic with exertion however this is is baseline respiratory status.    GENERAL: No fevers, no chills.  EYES: No blurry vision,  No photophobia  ENT: No sore throat.  No dysphagia  Cardiovascular: No chest pain, palpitations, orthopnea  Pulmonary: No cough, no wheezing. mild shortness of breath with significant exertion   Gastrointestinal: No abdominal pain, no diarrhea, no constipation.    Musculoskeletal: No weakness.  No myalgias.  Dermatology:  No rashes.  Neuro: No Headache.  No vertigo.  No dizziness.  Psych: No anxiety, no depression.  Denies suicidal thoughts.    Vital Signs Last 24 Hrs  T(C): 37.3 (11 Jun 2019 05:21), Max: 37.3 (11 Jun 2019 05:21)  T(F): 99.2 (11 Jun 2019 05:21), Max: 99.2 (11 Jun 2019 05:21)  HR: 90 (11 Jun 2019 12:15) (66 - 90)  BP: 156/80 (11 Jun 2019 12:15) (156/80 - 179/93)  BP(mean): --  RR: 19 (11 Jun 2019 12:15) (18 - 19)  SpO2: 92% (11 Jun 2019 12:15) (92% - 97%)    GENERAL: NAD, well-developed  HEAD:  Atraumatic, Normocephalic  EYES: EOMI, PERRLA, conjunctiva and sclera clear  ENT: Pharynx not erythematous  PULMONARY:  No wheeze, mild rales at b/l lung bases   CARDIOVASCULAR: Regular rate and rhythm; No murmurs, rubs, or gallops  ABDOMEN: Soft, Nontender, Nondistended; Bowel sounds present  EXTREMITIES:  2+ Peripheral Pulses, No clubbing, cyanosis, or edema  MUSCULOSKELETAL: No calf tenderness  PSYCH: AAOx3, normal affect  SKIN: warm and dry, No rashes or lesions

## 2019-06-11 NOTE — DISCHARGE NOTE NURSING/CASE MANAGEMENT/SOCIAL WORK - NSDCPEPTCOWAR_GEN_ALL_CORE
Coumadin/Warfarin - Potential for adverse drug reactions and interactions/Coumadin/Warfarin - Follow up monitoring/Coumadin/Warfarin - Dietary Advice/Coumadin/Warfarin - Compliance

## 2019-06-11 NOTE — PROGRESS NOTE ADULT - ATTENDING COMMENTS
patient is saturating well on room air. stable respiratory status. his fs are uncontrolled but will stabilize once he is on his home dose of insulin and off steroid.    he is stable for discharge. discussed with floor NP Sarah. Patient to be discharged to home with wife and daughter. patient is saturating well on room air. stable respiratory status. his fs are uncontrolled but will stabilize once he is on his home dose of insulin and off steroid.    he is stable for discharge. discussed with floor NP Sarah. Patient to be discharged to home with wife and daughter. 40 minutes spent in preparation of discharge.

## 2019-06-11 NOTE — DISCHARGE NOTE PROVIDER - CARE PROVIDER_API CALL
Francesco Savage)  Family Medicine  [address] [place] [address]  Phone: [phone]  Fax: [phone]  Follow Up Time: 1 week

## 2019-06-11 NOTE — DISCHARGE NOTE PROVIDER - NSDCCPCAREPLAN_GEN_ALL_CORE_FT
PRINCIPAL DISCHARGE DIAGNOSIS  Diagnosis: Laceration of tongue, initial encounter  Assessment and Plan of Treatment: s/p sutering  Avoid eating meals that are too hot or cold  Notify your PMD for increased swelling, worsening pain or difficulty swallowing.      SECONDARY DISCHARGE DIAGNOSES  Diagnosis: COPD with acute exacerbation  Assessment and Plan of Treatment: improved  Continue Prednisone for 4 more days (total 5 days)  Continue your respiratory mediction as ordered.  Notify your PMD for worsening shortness of breathe and wheezing.    Diagnosis: Essential hypertension  Assessment and Plan of Treatment: Controlled  Take your medications as prescribed  Maintain a low low in sodium    Diagnosis: Chronic atrial fibrillation  Assessment and Plan of Treatment: Continue Coumadin as ordered  Check your INR as scheduled    Diagnosis: Type 2 diabetes mellitus with complication, with long-term current use of insulin  Assessment and Plan of Treatment: 06/11: HbA1c 8.6  Please note that your blood sugar may be higher while taking a steriod.  Please contact your PMD for values greater than 300 mg/dl.  For symptoms such as increased thirst, headaches, blurred vision, frequent urination

## 2019-06-11 NOTE — PROGRESS NOTE ADULT - PROBLEM SELECTOR PLAN 1
-Secondary to acute Enterovirus infection 2' missed duoneb treatments x 1 day  - wheezing resolved, can complete 5 day course of PO prednisone at home   - Continue home doses of DuoNeb Q6hr ATC  - 92% at room air

## 2019-06-14 ENCOUNTER — EMERGENCY (EMERGENCY)
Facility: HOSPITAL | Age: 84
LOS: 1 days | Discharge: HOME | End: 2019-06-14
Attending: EMERGENCY MEDICINE
Payer: MEDICARE

## 2019-06-14 VITALS
DIASTOLIC BLOOD PRESSURE: 93 MMHG | SYSTOLIC BLOOD PRESSURE: 182 MMHG | HEART RATE: 88 BPM | TEMPERATURE: 98 F | OXYGEN SATURATION: 96 %

## 2019-06-14 VITALS
HEIGHT: 69 IN | RESPIRATION RATE: 22 BRPM | HEART RATE: 87 BPM | OXYGEN SATURATION: 90 % | WEIGHT: 222.01 LBS | SYSTOLIC BLOOD PRESSURE: 153 MMHG | DIASTOLIC BLOOD PRESSURE: 79 MMHG

## 2019-06-14 DIAGNOSIS — Z98.890 OTHER SPECIFIED POSTPROCEDURAL STATES: Chronic | ICD-10-CM

## 2019-06-14 DIAGNOSIS — Z98.49 CATARACT EXTRACTION STATUS, UNSPECIFIED EYE: Chronic | ICD-10-CM

## 2019-06-14 PROBLEM — I48.91 UNSPECIFIED ATRIAL FIBRILLATION: Chronic | Status: ACTIVE | Noted: 2019-06-10

## 2019-06-14 LAB
ALBUMIN SERPL ELPH-MCNC: 3.4 G/DL — SIGNIFICANT CHANGE UP (ref 3.3–5)
ALP SERPL-CCNC: 77 U/L — SIGNIFICANT CHANGE UP (ref 40–120)
ALT FLD-CCNC: 17 U/L — SIGNIFICANT CHANGE UP (ref 10–45)
ANION GAP SERPL CALC-SCNC: 10 MMOL/L — SIGNIFICANT CHANGE UP (ref 5–17)
APTT BLD: 40.5 SEC — HIGH (ref 27.5–36.3)
AST SERPL-CCNC: 13 U/L — SIGNIFICANT CHANGE UP (ref 10–40)
BASOPHILS # BLD AUTO: 0 K/UL — SIGNIFICANT CHANGE UP (ref 0–0.2)
BASOPHILS NFR BLD AUTO: 0.3 % — SIGNIFICANT CHANGE UP (ref 0–2)
BILIRUB SERPL-MCNC: 0.6 MG/DL — SIGNIFICANT CHANGE UP (ref 0.2–1.2)
BUN SERPL-MCNC: 25 MG/DL — HIGH (ref 7–23)
CALCIUM SERPL-MCNC: 9 MG/DL — SIGNIFICANT CHANGE UP (ref 8.4–10.5)
CHLORIDE SERPL-SCNC: 101 MMOL/L — SIGNIFICANT CHANGE UP (ref 96–108)
CO2 SERPL-SCNC: 28 MMOL/L — SIGNIFICANT CHANGE UP (ref 22–31)
CREAT SERPL-MCNC: 1.18 MG/DL — SIGNIFICANT CHANGE UP (ref 0.5–1.3)
EOSINOPHIL # BLD AUTO: 0.1 K/UL — SIGNIFICANT CHANGE UP (ref 0–0.5)
EOSINOPHIL NFR BLD AUTO: 0.6 % — SIGNIFICANT CHANGE UP (ref 0–6)
GLUCOSE SERPL-MCNC: 201 MG/DL — HIGH (ref 70–99)
HCT VFR BLD CALC: 39 % — SIGNIFICANT CHANGE UP (ref 39–50)
HGB BLD-MCNC: 13.5 G/DL — SIGNIFICANT CHANGE UP (ref 13–17)
INR BLD: 4.12 RATIO — HIGH (ref 0.88–1.16)
LYMPHOCYTES # BLD AUTO: 1.1 K/UL — SIGNIFICANT CHANGE UP (ref 1–3.3)
LYMPHOCYTES # BLD AUTO: 8 % — LOW (ref 13–44)
MCHC RBC-ENTMCNC: 32.5 PG — SIGNIFICANT CHANGE UP (ref 27–34)
MCHC RBC-ENTMCNC: 34.6 GM/DL — SIGNIFICANT CHANGE UP (ref 32–36)
MCV RBC AUTO: 93.8 FL — SIGNIFICANT CHANGE UP (ref 80–100)
MONOCYTES # BLD AUTO: 0.3 K/UL — SIGNIFICANT CHANGE UP (ref 0–0.9)
MONOCYTES NFR BLD AUTO: 2.1 % — SIGNIFICANT CHANGE UP (ref 2–14)
NEUTROPHILS # BLD AUTO: 12.3 K/UL — HIGH (ref 1.8–7.4)
NEUTROPHILS NFR BLD AUTO: 89 % — HIGH (ref 43–77)
PLATELET # BLD AUTO: 212 K/UL — SIGNIFICANT CHANGE UP (ref 150–400)
POTASSIUM SERPL-MCNC: 3.6 MMOL/L — SIGNIFICANT CHANGE UP (ref 3.5–5.3)
POTASSIUM SERPL-SCNC: 3.6 MMOL/L — SIGNIFICANT CHANGE UP (ref 3.5–5.3)
PROT SERPL-MCNC: 6.3 G/DL — SIGNIFICANT CHANGE UP (ref 6–8.3)
PROTHROM AB SERPL-ACNC: 49.5 SEC — HIGH (ref 10–12.9)
RBC # BLD: 4.16 M/UL — LOW (ref 4.2–5.8)
RBC # FLD: 11.9 % — SIGNIFICANT CHANGE UP (ref 10.3–14.5)
SODIUM SERPL-SCNC: 139 MMOL/L — SIGNIFICANT CHANGE UP (ref 135–145)
WBC # BLD: 13.8 K/UL — HIGH (ref 3.8–10.5)
WBC # FLD AUTO: 13.8 K/UL — HIGH (ref 3.8–10.5)

## 2019-06-14 PROCEDURE — 99283 EMERGENCY DEPT VISIT LOW MDM: CPT

## 2019-06-14 PROCEDURE — 99284 EMERGENCY DEPT VISIT MOD MDM: CPT

## 2019-06-14 PROCEDURE — 80053 COMPREHEN METABOLIC PANEL: CPT

## 2019-06-14 PROCEDURE — 85610 PROTHROMBIN TIME: CPT

## 2019-06-14 PROCEDURE — 85730 THROMBOPLASTIN TIME PARTIAL: CPT

## 2019-06-14 PROCEDURE — 85027 COMPLETE CBC AUTOMATED: CPT

## 2019-06-14 NOTE — ED PROVIDER NOTE - CLINICAL SUMMARY MEDICAL DECISION MAKING FREE TEXT BOX
ADRIA Agustin MD: Pt is an 83 y/o male with PMH of of A fib, COPD, CHF, HTN, DM II who p/w c/o tongue bleeding. No obvious laceration or defect seen on tongue. Will check INR, basic labs, apply

## 2019-06-14 NOTE — ED PROVIDER NOTE - ENMT, MLM
Airway patent, Nasal mucosa clear. Mouth with normal mucosa. Throat has no vesicles, no oropharyngeal exudates and uvula is midline. No obvious laceration, +scant dark red blood to surface of tongue, no clots

## 2019-06-14 NOTE — DISCHARGE NOTE ADULT - LAUNCH MEDICATION RECONCILIATION
I will STOP taking the medications listed below when I get home from the hospital:  None <<-----Click here for Discharge Medication Review

## 2019-06-14 NOTE — ED PROVIDER NOTE - PMH
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

## 2019-06-14 NOTE — ED PROVIDER NOTE - PROGRESS NOTE DETAILS
ADRIA Agustin MD: Pt no longer with bleeding from tongue. ADRIA Agustin MD: Surgicell placed, pt observed after 1.5 hr, pt no longer with bleeding from tongue. INR=4. Hgb stable. Pt advised to hold coumadin x 1 night (tonight) and to f/u with PMD in 1-3 days. Spoke with ENT, they stated that since pt no longer bleeding, may f/u in their clinic. Will provide return precautions.

## 2019-06-14 NOTE — ED ADULT NURSE NOTE - OBJECTIVE STATEMENT
85 y/o male a/ox3 wife at bedside with a PMH Of A fib, COPD, CHF, HTN, DM II presenting to ed with tongue bleeding. patient and wife states this occurred this morning. was recently here and admitted for same issue. denies any trauma, biting of tongue. on coumadin, does not know INR level.  Denies: f/c, n/v/d, abd pain, HA, CP, SOB, cough, other bruising or bleeding, hematuria, bloody or black stools 85 y/o male a/ox3 wife at bedside with a PMH Of A fib, COPD, CHF, HTN, DM II presenting to ed with tongue bleeding. patient and wife states this occurred this morning. was recently here and admitted for same issue. denies any trauma, biting of tongue. on coumadin, does not know INR level.  Denies: f/c, n/v/d, abd pain, HA, CP, SOB, cough, other bruising or bleeding, hematuria, bloody or black stools. currently no active bleeding noted

## 2019-06-14 NOTE — ED PROVIDER NOTE - NSFOLLOWUPINSTRUCTIONS_ED_ALL_ED_FT
YOUR INR TODAY = 4.12.  HOLD YOUR COUMADIN DOSE TONIGHT (JUST ONE TIME). RESUME COUMADIN TOMORROW NIGHT. FOLLOW-UP WITH YOUR PRIMARY CARE PHYSICIAN IN 1-3 DAYS.   CALL ENT CLINIC at [phone] TO SCHEDULE AN APPOINTMENT WITHIN 1 WEEK.    IF YOU DEVELOP FURTHER BLEEDING FROM THE TONGUE, APPLY PRESSURE AND RETURN TO THE ER.    WHAT YOU NEED TO KNOW:    What is an elevated INR? The INR, or International Normalized Ratio, is a measure of how long it takes your blood to clot. A prothrombin time (PT) is a another blood test done to help measure your INR. The higher your PT or INR, the longer your blood takes to clot. An elevated PT or INR means your blood is taking longer to clot than your healthcare provider believes is healthy for you. When your PT or INR is too high, you have an increased risk of bleeding.    What increases my risk for an elevated INR?     Too much anticoagulant medicine, a type of blood thinner that helps prevent clots       Other medicines, such as aspirin, NSAIDs, and some antibiotics, when you also are using anticoagulants       Health conditions, such as liver failure or bleeding disorders       A sudden decrease of vitamin K in your diet     What are the signs and symptoms of an elevated INR? You may have small cuts that bleed more than normal, and for longer than normal. You may bruise easily, have frequent nosebleeds, or notice your gums bleeding.    How is an elevated INR treated? Treatment depends on whether you currently have bleeding and how severe it is. If you take an anticoagulant medicine, your healthcare provider may change your dose, or tell you to skip one or more doses. You may need one of the following treatments:     Vitamin K may be given to decrease your INR and bleeding.      Blood components may be given during a transfusion to help stop your bleeding. Blood components are the parts of blood that help it to clot. Examples are clotting factors, platelets, and plasma.    How can I prevent an elevated INR?     Have your INR measured regularly. Your healthcare provider may want your INR to be measured every few days until it is stable, and then only once a month. You may have blood drawn at your healthcare provider's office. Some people can test their blood at home.      If you take medicine, take it as directed. Contact your healthcare provider before you take other medicines or supplements, because they may elevate your INR.      Eat the same amount of vitamin K daily to keep your INR stable. Vitamin K changes how your blood clots and affects your INR. Vitamin K is found in green leafy vegetables, broccoli, grapes, and other foods. Ask your healthcare provider for more information about what to eat when you have an elevated INR.      Limit alcohol. Alcohol increases your INR. Ask your healthcare provider how much alcohol is safe for you.      Do not smoke. If you smoke, it is never too late to quit. Smoking can affect the way your blood clots. Ask for information if you need help quitting.    How can I decrease my risk of bleeding?     Avoid activities that may cause bleeding or bruising.      Brush and shave gently. Use a soft toothbrush and an electric razor to avoid bleeding.      Tell your dentist and other healthcare providers if you take anticoagulant medicine or have a bleeding disorder. Wear medical alert jewelry, or carry a card that gives this information. Ask where you can get these items.    When should I contact my healthcare provider?     Your menstrual period is heavier than normal.      You see blood in your urine.      Your bowel movement is bloody or black.      You bruise or bleed more than normal, your gums bleed, or you have frequent nosebleeds.      You have pain or swelling in your joints.      Your fingers or toes turn dark purple.      You have more headaches than normal, or your headaches are different than before.      You have questions or concerns about your care.    When should I seek immediate care or call 911?     You throw up blood, or your vomit looks like coffee grounds.      You have any kind of bleeding that does not stop in 15 minutes.      Your leg feels warm, tender, and painful. It may look swollen and red.      You have any of the following signs of a stroke:   Numbness or drooping on one side of your face       Weakness in an arm or leg      Confusion or difficulty speaking      Dizziness, a severe headache, or vision loss    CARE AGREEMENT:    You have the right to help plan your care. Learn about your health condition and how it may be treated. Discuss treatment options with your healthcare providers to decide what care you want to receive. You always have the right to refuse treatment.

## 2019-06-14 NOTE — ED PROVIDER NOTE - OBJECTIVE STATEMENT
Pt is an 83 y/o male with PMH of of A fib, COPD, CHF, HTN, DM II who p/w c/o tongue bleeding. Per patient and wife, sx began this AM. Pt does not recall biting or injuring his tongue today. Of note, pt seen here 5 days ago for same, and tongue was sutured to control bleeding. Has not had INR checked since. Denies: f/c, n/v/d, abd pain, HA, CP, SOB, cough, other bruising or bleeding, hematuria, bloody or black stools

## 2019-07-08 ENCOUNTER — RX RENEWAL (OUTPATIENT)
Age: 84
End: 2019-07-08

## 2019-07-11 ENCOUNTER — APPOINTMENT (OUTPATIENT)
Dept: CARDIOLOGY | Facility: CLINIC | Age: 84
End: 2019-07-11
Payer: MEDICARE

## 2019-07-11 VITALS
BODY MASS INDEX: 32.58 KG/M2 | WEIGHT: 220 LBS | HEART RATE: 70 BPM | SYSTOLIC BLOOD PRESSURE: 125 MMHG | DIASTOLIC BLOOD PRESSURE: 78 MMHG | RESPIRATION RATE: 15 BRPM | HEIGHT: 69 IN

## 2019-07-11 DIAGNOSIS — R09.89 OTHER SPECIFIED SYMPTOMS AND SIGNS INVOLVING THE CIRCULATORY AND RESPIRATORY SYSTEMS: ICD-10-CM

## 2019-07-11 PROCEDURE — 99214 OFFICE O/P EST MOD 30 MIN: CPT

## 2019-07-11 RX ORDER — METOLAZONE 2.5 MG/1
2.5 TABLET ORAL DAILY
Qty: 15 | Refills: 1 | Status: ACTIVE | COMMUNITY
Start: 2019-07-11 | End: 1900-01-01

## 2019-07-11 NOTE — REASON FOR VISIT
[Follow-Up - Clinic] : a clinic follow-up of [Atrial Fibrillation] : atrial fibrillation [Coronary Artery Disease] : coronary artery disease [Hyperlipidemia] : hyperlipidemia [Hypertension] : hypertension [Mitral Regurgitation] : mitral regurgitation [FreeTextEntry1] : This is an 83-year-old male with past medical history significant for diabetes mellitus, paroxysmal atrial fibrillation, hypertension, hyperlipidemia, mitral valve regurgitation, history of cerebrovascular event, cholelithiasis viscous bile duct stent, coronary artery disease, who comes in for cardiac followup evaluation.He denies chest pain, chest pressure, PND, dizziness or syncope. The patient is currently mostly wheelchair-bound. Pt presents with bilateral lower extremity edema.

## 2019-07-11 NOTE — DISCUSSION/SUMMARY
[FreeTextEntry1] : This is an 84-year-old male with past medical history significant for diabetes mellitus, paroxysmal atrial fibrillation, hypertension, hyperlipidemia, mitral valve regurgitation, history of cerebrovascular event, cholelithiasis viscous bile duct stent, coronary artery disease, who comes in for cardiac followup evaluation.He denies chest pain, chest pressure, PND, dizziness or syncope. The patient is currently mostly wheelchair-bound.\par The patient has bilateral pedal edema 2+/3 extending from his ankles to the lower tibial area.  He will start on Zaroxolyn 2.5 mg on Monday Wednesday and Friday with an additional potassium tablet on Monday Wednesday and Friday.  He will also increase his Lasix to 40 mg daily.  He will elevate his legs and limit his salt intake.  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  I recommended they see a vascular surgeon.  The wife reports that he has been evaluated for this before the surgeons did not want to operate.  They understand his risk of stroke, and death.\par The patient will followup with me in 3 weeks to reevaluate his edema.  He will have blood work done for electrolytes prior to that visit.\par He is currently hemodynamically stable and asymptomatic cardiac standpoint.\par Electrocardiogram done February 21, 2019 demonstrated normal sinus rhythm rate of 90 beats per minute with evidence for first degree atrioventricular block and nonspecific ST-T wave flattening.\par He has a CHADSVASC SCORE OF 6 and remains on anticoagulation with warfarin. He follows up with you regarding his INR.\par He had a cardiac catheterization 2001 which demonstrated 20-30% lesion the right coronary artery.\par . His LDL cholesterol goal should be less than 70 mg/dL.\par Leg elevation, salt restriction, and compression stockings were recommeneded.

## 2019-07-11 NOTE — PHYSICAL EXAM
[General Appearance - Well Developed] : well developed [Normal Appearance] : normal appearance [General Appearance - Well Nourished] : well nourished [Well Groomed] : well groomed [General Appearance - In No Acute Distress] : no acute distress [No Deformities] : no deformities [Normal Conjunctiva] : the conjunctiva exhibited no abnormalities [Normal Oral Mucosa] : normal oral mucosa [JVD Elevated _____cm] : JVD elevated [unfilled] ~U cm above clavicle [Normal Jugular Venous V Waves Present] : normal jugular venous V waves present [Exaggerated Use Of Accessory Muscles For Inspiration] : no accessory muscle use [Auscultation Breath Sounds / Voice Sounds] : lungs were clear to auscultation bilaterally [Respiration, Rhythm And Depth] : normal respiratory rhythm and effort [Bowel Sounds] : normal bowel sounds [Abdomen Soft] : soft [Abnormal Walk] : normal gait [Nail Clubbing] : no clubbing of the fingernails [Skin Color & Pigmentation] : normal skin color and pigmentation [Cyanosis, Localized] : no localized cyanosis [Skin Turgor] : normal skin turgor [] : no rash [Oriented To Time, Place, And Person] : oriented to person, place, and time [Affect] : the affect was normal [Mood] : the mood was normal [No Anxiety] : not feeling anxious [Normal] : normal [5th Left ICS - MCL] : palpated at the 5th LICS in the midclavicular line [No Precordial Heave] : no precordial heave was noted [Rhythm Regular] : regular [Normal Rate] : normal [Normal S2] : normal S2 [Normal S1] : normal S1 [No Gallop] : no gallop heard [II] : a grade 2 [No Abnormalities] : the abdominal aorta was not enlarged and no bruit was heard [___ +] : bilateral [unfilled]U+ pitting edema to the ankles [Normal Jugular Venous A Waves Present] : normal jugular venous A waves present [No Jugular Venous Moe A Waves] : no jugular venous moe A waves [2+] : left 2+ [S3] : no S3 [S4] : no S4 [Click] : no click [Right Carotid Bruit] : no bruit heard over the right carotid [Pericardial Rub] : no pericardial rub [Left Carotid Bruit] : no bruit heard over the left carotid [Left Femoral Bruit] : no bruit heard over the left femoral artery [Right Femoral Bruit] : no bruit heard over the right femoral artery

## 2019-07-11 NOTE — REVIEW OF SYSTEMS
No deformity or limitation of movement [Lower Ext Edema] : lower extremity edema [Negative] : Heme/Lymph [Shortness Of Breath] : no shortness of breath [Chest  Pressure] : no chest pressure [Dyspnea on exertion] : not dyspnea during exertion [Leg Claudication] : no intermittent leg claudication [Chest Pain] : no chest pain [Palpitations] : no palpitations

## 2019-09-08 ENCOUNTER — INPATIENT (INPATIENT)
Facility: HOSPITAL | Age: 84
LOS: 2 days | Discharge: HOME | DRG: 194 | End: 2019-09-11
Attending: INTERNAL MEDICINE | Admitting: INTERNAL MEDICINE
Payer: MEDICARE

## 2019-09-08 VITALS
TEMPERATURE: 100 F | WEIGHT: 214.95 LBS | SYSTOLIC BLOOD PRESSURE: 129 MMHG | RESPIRATION RATE: 18 BRPM | HEART RATE: 99 BPM | HEIGHT: 68 IN | OXYGEN SATURATION: 93 % | DIASTOLIC BLOOD PRESSURE: 76 MMHG

## 2019-09-08 DIAGNOSIS — Z98.49 CATARACT EXTRACTION STATUS, UNSPECIFIED EYE: Chronic | ICD-10-CM

## 2019-09-08 DIAGNOSIS — J18.9 PNEUMONIA, UNSPECIFIED ORGANISM: ICD-10-CM

## 2019-09-08 DIAGNOSIS — Z98.890 OTHER SPECIFIED POSTPROCEDURAL STATES: Chronic | ICD-10-CM

## 2019-09-08 LAB
ALBUMIN SERPL ELPH-MCNC: 3.6 G/DL — SIGNIFICANT CHANGE UP (ref 3.3–5)
ALP SERPL-CCNC: 76 U/L — SIGNIFICANT CHANGE UP (ref 40–120)
ALT FLD-CCNC: 13 U/L — SIGNIFICANT CHANGE UP (ref 10–45)
ANION GAP SERPL CALC-SCNC: 17 MMOL/L — SIGNIFICANT CHANGE UP (ref 5–17)
APPEARANCE UR: CLEAR — SIGNIFICANT CHANGE UP
APTT BLD: 43 SEC — HIGH (ref 27.5–36.3)
AST SERPL-CCNC: 11 U/L — SIGNIFICANT CHANGE UP (ref 10–40)
BACTERIA # UR AUTO: NEGATIVE — SIGNIFICANT CHANGE UP
BASE EXCESS BLDV CALC-SCNC: 2.5 MMOL/L — HIGH (ref -2–2)
BASE EXCESS BLDV CALC-SCNC: 2.9 MMOL/L — HIGH (ref -2–2)
BASE EXCESS BLDV CALC-SCNC: 4.2 MMOL/L — HIGH (ref -2–2)
BASOPHILS # BLD AUTO: 0.1 K/UL — SIGNIFICANT CHANGE UP (ref 0–0.2)
BILIRUB SERPL-MCNC: 0.6 MG/DL — SIGNIFICANT CHANGE UP (ref 0.2–1.2)
BILIRUB UR-MCNC: NEGATIVE — SIGNIFICANT CHANGE UP
BUN SERPL-MCNC: 57 MG/DL — HIGH (ref 7–23)
CA-I SERPL-SCNC: 1.05 MMOL/L — LOW (ref 1.12–1.3)
CA-I SERPL-SCNC: 1.07 MMOL/L — LOW (ref 1.12–1.3)
CA-I SERPL-SCNC: 1.11 MMOL/L — LOW (ref 1.12–1.3)
CALCIUM SERPL-MCNC: 9.2 MG/DL — SIGNIFICANT CHANGE UP (ref 8.4–10.5)
CHLORIDE BLDV-SCNC: 100 MMOL/L — SIGNIFICANT CHANGE UP (ref 96–108)
CHLORIDE BLDV-SCNC: 102 MMOL/L — SIGNIFICANT CHANGE UP (ref 96–108)
CHLORIDE BLDV-SCNC: 102 MMOL/L — SIGNIFICANT CHANGE UP (ref 96–108)
CHLORIDE SERPL-SCNC: 98 MMOL/L — SIGNIFICANT CHANGE UP (ref 96–108)
CK SERPL-CCNC: 81 U/L — SIGNIFICANT CHANGE UP (ref 30–200)
CO2 BLDV-SCNC: 29 MMOL/L — SIGNIFICANT CHANGE UP (ref 22–30)
CO2 BLDV-SCNC: 29 MMOL/L — SIGNIFICANT CHANGE UP (ref 22–30)
CO2 BLDV-SCNC: 30 MMOL/L — SIGNIFICANT CHANGE UP (ref 22–30)
CO2 SERPL-SCNC: 27 MMOL/L — SIGNIFICANT CHANGE UP (ref 22–31)
COLOR SPEC: YELLOW — SIGNIFICANT CHANGE UP
CREAT SERPL-MCNC: 2.09 MG/DL — HIGH (ref 0.5–1.3)
DIFF PNL FLD: NEGATIVE — SIGNIFICANT CHANGE UP
EOSINOPHIL # BLD AUTO: 0.1 K/UL — SIGNIFICANT CHANGE UP (ref 0–0.5)
EPI CELLS # UR: 0 /HPF — SIGNIFICANT CHANGE UP
GAS PNL BLDV: 138 MMOL/L — SIGNIFICANT CHANGE UP (ref 135–145)
GAS PNL BLDV: 139 MMOL/L — SIGNIFICANT CHANGE UP (ref 135–145)
GAS PNL BLDV: 141 MMOL/L — SIGNIFICANT CHANGE UP (ref 135–145)
GAS PNL BLDV: SIGNIFICANT CHANGE UP
GLUCOSE BLDC GLUCOMTR-MCNC: 174 MG/DL — HIGH (ref 70–99)
GLUCOSE BLDC GLUCOMTR-MCNC: 182 MG/DL — HIGH (ref 70–99)
GLUCOSE BLDC GLUCOMTR-MCNC: 193 MG/DL — HIGH (ref 70–99)
GLUCOSE BLDV-MCNC: 251 MG/DL — HIGH (ref 70–99)
GLUCOSE BLDV-MCNC: 260 MG/DL — HIGH (ref 70–99)
GLUCOSE BLDV-MCNC: 301 MG/DL — HIGH (ref 70–99)
GLUCOSE SERPL-MCNC: 270 MG/DL — HIGH (ref 70–99)
GLUCOSE UR QL: NEGATIVE — SIGNIFICANT CHANGE UP
HCO3 BLDV-SCNC: 28 MMOL/L — SIGNIFICANT CHANGE UP (ref 21–29)
HCT VFR BLD CALC: 42.1 % — SIGNIFICANT CHANGE UP (ref 39–50)
HCT VFR BLDA CALC: 38 % — LOW (ref 39–50)
HCT VFR BLDA CALC: 38 % — LOW (ref 39–50)
HCT VFR BLDA CALC: 44 % — SIGNIFICANT CHANGE UP (ref 39–50)
HGB BLD CALC-MCNC: 12.2 G/DL — LOW (ref 13–17)
HGB BLD CALC-MCNC: 12.5 G/DL — LOW (ref 13–17)
HGB BLD CALC-MCNC: 14.4 G/DL — SIGNIFICANT CHANGE UP (ref 13–17)
HGB BLD-MCNC: 14.1 G/DL — SIGNIFICANT CHANGE UP (ref 13–17)
HYALINE CASTS # UR AUTO: 3 /LPF — HIGH (ref 0–2)
INR BLD: 3.96 RATIO — HIGH (ref 0.88–1.16)
KETONES UR-MCNC: NEGATIVE — SIGNIFICANT CHANGE UP
LACTATE BLDV-MCNC: 2.8 MMOL/L — HIGH (ref 0.7–2)
LACTATE BLDV-MCNC: 3.3 MMOL/L — HIGH (ref 0.7–2)
LACTATE BLDV-MCNC: 3.4 MMOL/L — HIGH (ref 0.7–2)
LEUKOCYTE ESTERASE UR-ACNC: NEGATIVE — SIGNIFICANT CHANGE UP
LIDOCAIN IGE QN: 22 U/L — SIGNIFICANT CHANGE UP (ref 7–60)
LYMPHOCYTES # BLD AUTO: 0.9 K/UL — LOW (ref 1–3.3)
LYMPHOCYTES # BLD AUTO: 8 % — LOW (ref 13–44)
MCHC RBC-ENTMCNC: 31.2 PG — SIGNIFICANT CHANGE UP (ref 27–34)
MCHC RBC-ENTMCNC: 33.5 GM/DL — SIGNIFICANT CHANGE UP (ref 32–36)
MCV RBC AUTO: 93.1 FL — SIGNIFICANT CHANGE UP (ref 80–100)
MONOCYTES # BLD AUTO: 0.6 K/UL — SIGNIFICANT CHANGE UP (ref 0–0.9)
MONOCYTES NFR BLD AUTO: 6 % — SIGNIFICANT CHANGE UP (ref 2–14)
NEUTROPHILS # BLD AUTO: 13.1 K/UL — HIGH (ref 1.8–7.4)
NEUTROPHILS NFR BLD AUTO: 77 % — SIGNIFICANT CHANGE UP (ref 43–77)
NEUTS BAND # BLD: 9 % — HIGH (ref 0–8)
NITRITE UR-MCNC: NEGATIVE — SIGNIFICANT CHANGE UP
OTHER CELLS CSF MANUAL: 10 ML/DL — LOW (ref 18–22)
OTHER CELLS CSF MANUAL: 12 ML/DL — LOW (ref 18–22)
OTHER CELLS CSF MANUAL: 6 ML/DL — LOW (ref 18–22)
PCO2 BLDV: 40 MMHG — SIGNIFICANT CHANGE UP (ref 35–50)
PCO2 BLDV: 46 MMHG — SIGNIFICANT CHANGE UP (ref 35–50)
PCO2 BLDV: 49 MMHG — SIGNIFICANT CHANGE UP (ref 35–50)
PH BLDV: 7.38 — SIGNIFICANT CHANGE UP (ref 7.35–7.45)
PH BLDV: 7.39 — SIGNIFICANT CHANGE UP (ref 7.35–7.45)
PH BLDV: 7.46 — HIGH (ref 7.35–7.45)
PH UR: 6 — SIGNIFICANT CHANGE UP (ref 5–8)
PLAT MORPH BLD: NORMAL — SIGNIFICANT CHANGE UP
PLATELET # BLD AUTO: 172 K/UL — SIGNIFICANT CHANGE UP (ref 150–400)
PO2 BLDV: 24 MMHG — LOW (ref 25–45)
PO2 BLDV: 33 MMHG — SIGNIFICANT CHANGE UP (ref 25–45)
PO2 BLDV: 35 MMHG — SIGNIFICANT CHANGE UP (ref 25–45)
POTASSIUM BLDV-SCNC: 2.7 MMOL/L — CRITICAL LOW (ref 3.5–5.3)
POTASSIUM BLDV-SCNC: 2.9 MMOL/L — CRITICAL LOW (ref 3.5–5.3)
POTASSIUM BLDV-SCNC: 2.9 MMOL/L — CRITICAL LOW (ref 3.5–5.3)
POTASSIUM SERPL-MCNC: 3 MMOL/L — LOW (ref 3.5–5.3)
POTASSIUM SERPL-SCNC: 3 MMOL/L — LOW (ref 3.5–5.3)
PROT SERPL-MCNC: 6.8 G/DL — SIGNIFICANT CHANGE UP (ref 6–8.3)
PROT UR-MCNC: ABNORMAL
PROTHROM AB SERPL-ACNC: 47.5 SEC — HIGH (ref 10–12.9)
RAPID RVP RESULT: SIGNIFICANT CHANGE UP
RBC # BLD: 4.52 M/UL — SIGNIFICANT CHANGE UP (ref 4.2–5.8)
RBC # FLD: 12.8 % — SIGNIFICANT CHANGE UP (ref 10.3–14.5)
RBC BLD AUTO: SIGNIFICANT CHANGE UP
RBC CASTS # UR COMP ASSIST: 1 /HPF — SIGNIFICANT CHANGE UP (ref 0–4)
SAO2 % BLDV: 36 % — LOW (ref 67–88)
SAO2 % BLDV: 56 % — LOW (ref 67–88)
SAO2 % BLDV: 62 % — LOW (ref 67–88)
SODIUM SERPL-SCNC: 142 MMOL/L — SIGNIFICANT CHANGE UP (ref 135–145)
SP GR SPEC: 1.02 — SIGNIFICANT CHANGE UP (ref 1.01–1.02)
UROBILINOGEN FLD QL: NEGATIVE — SIGNIFICANT CHANGE UP
WBC # BLD: 14.9 K/UL — HIGH (ref 3.8–10.5)
WBC # FLD AUTO: 14.9 K/UL — HIGH (ref 3.8–10.5)
WBC UR QL: 1 /HPF — SIGNIFICANT CHANGE UP (ref 0–5)

## 2019-09-08 PROCEDURE — 72125 CT NECK SPINE W/O DYE: CPT | Mod: 26

## 2019-09-08 PROCEDURE — 70450 CT HEAD/BRAIN W/O DYE: CPT | Mod: 26

## 2019-09-08 PROCEDURE — 99291 CRITICAL CARE FIRST HOUR: CPT

## 2019-09-08 PROCEDURE — 71045 X-RAY EXAM CHEST 1 VIEW: CPT | Mod: 26

## 2019-09-08 PROCEDURE — 93010 ELECTROCARDIOGRAM REPORT: CPT

## 2019-09-08 PROCEDURE — 71250 CT THORAX DX C-: CPT | Mod: 26

## 2019-09-08 PROCEDURE — 72170 X-RAY EXAM OF PELVIS: CPT | Mod: 26

## 2019-09-08 RX ORDER — IPRATROPIUM/ALBUTEROL SULFATE 18-103MCG
3 AEROSOL WITH ADAPTER (GRAM) INHALATION EVERY 6 HOURS
Refills: 0 | Status: DISCONTINUED | OUTPATIENT
Start: 2019-09-08 | End: 2019-09-11

## 2019-09-08 RX ORDER — BUDESONIDE, MICRONIZED 100 %
0.25 POWDER (GRAM) MISCELLANEOUS
Refills: 0 | Status: DISCONTINUED | OUTPATIENT
Start: 2019-09-08 | End: 2019-09-10

## 2019-09-08 RX ORDER — INSULIN GLARGINE 100 [IU]/ML
34 INJECTION, SOLUTION SUBCUTANEOUS AT BEDTIME
Refills: 0 | Status: DISCONTINUED | OUTPATIENT
Start: 2019-09-08 | End: 2019-09-09

## 2019-09-08 RX ORDER — INSULIN LISPRO 100/ML
VIAL (ML) SUBCUTANEOUS
Refills: 0 | Status: DISCONTINUED | OUTPATIENT
Start: 2019-09-08 | End: 2019-09-11

## 2019-09-08 RX ORDER — LOSARTAN POTASSIUM 100 MG/1
25 TABLET, FILM COATED ORAL DAILY
Refills: 0 | Status: DISCONTINUED | OUTPATIENT
Start: 2019-09-08 | End: 2019-09-11

## 2019-09-08 RX ORDER — ACETAMINOPHEN 500 MG
975 TABLET ORAL ONCE
Refills: 0 | Status: COMPLETED | OUTPATIENT
Start: 2019-09-08 | End: 2019-09-08

## 2019-09-08 RX ORDER — CEFTRIAXONE 500 MG/1
1000 INJECTION, POWDER, FOR SOLUTION INTRAMUSCULAR; INTRAVENOUS EVERY 24 HOURS
Refills: 0 | Status: DISCONTINUED | OUTPATIENT
Start: 2019-09-08 | End: 2019-09-11

## 2019-09-08 RX ORDER — FUROSEMIDE 40 MG
20 TABLET ORAL
Refills: 0 | Status: DISCONTINUED | OUTPATIENT
Start: 2019-09-08 | End: 2019-09-08

## 2019-09-08 RX ORDER — SODIUM CHLORIDE 9 MG/ML
1000 INJECTION, SOLUTION INTRAVENOUS ONCE
Refills: 0 | Status: COMPLETED | OUTPATIENT
Start: 2019-09-08 | End: 2019-09-08

## 2019-09-08 RX ORDER — ASPIRIN/CALCIUM CARB/MAGNESIUM 324 MG
81 TABLET ORAL DAILY
Refills: 0 | Status: DISCONTINUED | OUTPATIENT
Start: 2019-09-08 | End: 2019-09-11

## 2019-09-08 RX ORDER — FUROSEMIDE 40 MG
20 TABLET ORAL DAILY
Refills: 0 | Status: DISCONTINUED | OUTPATIENT
Start: 2019-09-08 | End: 2019-09-11

## 2019-09-08 RX ORDER — MONTELUKAST 4 MG/1
10 TABLET, CHEWABLE ORAL DAILY
Refills: 0 | Status: DISCONTINUED | OUTPATIENT
Start: 2019-09-08 | End: 2019-09-11

## 2019-09-08 RX ORDER — DEXTROSE 50 % IN WATER 50 %
25 SYRINGE (ML) INTRAVENOUS ONCE
Refills: 0 | Status: DISCONTINUED | OUTPATIENT
Start: 2019-09-08 | End: 2019-09-11

## 2019-09-08 RX ORDER — SODIUM CHLORIDE 9 MG/ML
1000 INJECTION, SOLUTION INTRAVENOUS
Refills: 0 | Status: DISCONTINUED | OUTPATIENT
Start: 2019-09-08 | End: 2019-09-11

## 2019-09-08 RX ORDER — TAMSULOSIN HYDROCHLORIDE 0.4 MG/1
0.4 CAPSULE ORAL AT BEDTIME
Refills: 0 | Status: DISCONTINUED | OUTPATIENT
Start: 2019-09-08 | End: 2019-09-11

## 2019-09-08 RX ORDER — ACETAMINOPHEN 500 MG
2 TABLET ORAL
Qty: 0 | Refills: 0 | DISCHARGE

## 2019-09-08 RX ORDER — POTASSIUM CHLORIDE 20 MEQ
40 PACKET (EA) ORAL ONCE
Refills: 0 | Status: COMPLETED | OUTPATIENT
Start: 2019-09-08 | End: 2019-09-08

## 2019-09-08 RX ORDER — ALBUTEROL 90 UG/1
1 AEROSOL, METERED ORAL EVERY 4 HOURS
Refills: 0 | Status: DISCONTINUED | OUTPATIENT
Start: 2019-09-08 | End: 2019-09-09

## 2019-09-08 RX ORDER — LOSARTAN POTASSIUM 100 MG/1
100 TABLET, FILM COATED ORAL DAILY
Refills: 0 | Status: DISCONTINUED | OUTPATIENT
Start: 2019-09-08 | End: 2019-09-08

## 2019-09-08 RX ORDER — SODIUM CHLORIDE 9 MG/ML
500 INJECTION INTRAMUSCULAR; INTRAVENOUS; SUBCUTANEOUS ONCE
Refills: 0 | Status: COMPLETED | OUTPATIENT
Start: 2019-09-08 | End: 2019-09-08

## 2019-09-08 RX ORDER — CEFTRIAXONE 500 MG/1
1000 INJECTION, POWDER, FOR SOLUTION INTRAMUSCULAR; INTRAVENOUS ONCE
Refills: 0 | Status: COMPLETED | OUTPATIENT
Start: 2019-09-08 | End: 2019-09-08

## 2019-09-08 RX ORDER — IPRATROPIUM/ALBUTEROL SULFATE 18-103MCG
3 AEROSOL WITH ADAPTER (GRAM) INHALATION ONCE
Refills: 0 | Status: COMPLETED | OUTPATIENT
Start: 2019-09-08 | End: 2019-09-08

## 2019-09-08 RX ORDER — FINASTERIDE 5 MG/1
5 TABLET, FILM COATED ORAL DAILY
Refills: 0 | Status: DISCONTINUED | OUTPATIENT
Start: 2019-09-08 | End: 2019-09-11

## 2019-09-08 RX ORDER — AZITHROMYCIN 500 MG/1
500 TABLET, FILM COATED ORAL ONCE
Refills: 0 | Status: COMPLETED | OUTPATIENT
Start: 2019-09-08 | End: 2019-09-08

## 2019-09-08 RX ORDER — SODIUM CHLORIDE 9 MG/ML
500 INJECTION, SOLUTION INTRAVENOUS ONCE
Refills: 0 | Status: DISCONTINUED | OUTPATIENT
Start: 2019-09-08 | End: 2019-09-08

## 2019-09-08 RX ORDER — ATORVASTATIN CALCIUM 80 MG/1
40 TABLET, FILM COATED ORAL AT BEDTIME
Refills: 0 | Status: DISCONTINUED | OUTPATIENT
Start: 2019-09-08 | End: 2019-09-11

## 2019-09-08 RX ORDER — AZITHROMYCIN 500 MG/1
500 TABLET, FILM COATED ORAL DAILY
Refills: 0 | Status: DISCONTINUED | OUTPATIENT
Start: 2019-09-08 | End: 2019-09-11

## 2019-09-08 RX ORDER — TIOTROPIUM BROMIDE 18 UG/1
1 CAPSULE ORAL; RESPIRATORY (INHALATION) DAILY
Refills: 0 | Status: DISCONTINUED | OUTPATIENT
Start: 2019-09-08 | End: 2019-09-11

## 2019-09-08 RX ORDER — DEXTROSE 50 % IN WATER 50 %
15 SYRINGE (ML) INTRAVENOUS ONCE
Refills: 0 | Status: DISCONTINUED | OUTPATIENT
Start: 2019-09-08 | End: 2019-09-11

## 2019-09-08 RX ORDER — DEXTROSE 50 % IN WATER 50 %
12.5 SYRINGE (ML) INTRAVENOUS ONCE
Refills: 0 | Status: DISCONTINUED | OUTPATIENT
Start: 2019-09-08 | End: 2019-09-11

## 2019-09-08 RX ORDER — GLUCAGON INJECTION, SOLUTION 0.5 MG/.1ML
1 INJECTION, SOLUTION SUBCUTANEOUS ONCE
Refills: 0 | Status: DISCONTINUED | OUTPATIENT
Start: 2019-09-08 | End: 2019-09-11

## 2019-09-08 RX ADMIN — Medication 3 MILLILITER(S): at 09:44

## 2019-09-08 RX ADMIN — Medication 975 MILLIGRAM(S): at 09:30

## 2019-09-08 RX ADMIN — AZITHROMYCIN 250 MILLIGRAM(S): 500 TABLET, FILM COATED ORAL at 10:20

## 2019-09-08 RX ADMIN — SODIUM CHLORIDE 500 MILLILITER(S): 9 INJECTION INTRAMUSCULAR; INTRAVENOUS; SUBCUTANEOUS at 20:14

## 2019-09-08 RX ADMIN — Medication 40 MILLIEQUIVALENT(S): at 11:10

## 2019-09-08 RX ADMIN — SODIUM CHLORIDE 500 MILLILITER(S): 9 INJECTION INTRAMUSCULAR; INTRAVENOUS; SUBCUTANEOUS at 09:40

## 2019-09-08 RX ADMIN — FINASTERIDE 5 MILLIGRAM(S): 5 TABLET, FILM COATED ORAL at 22:57

## 2019-09-08 RX ADMIN — ATORVASTATIN CALCIUM 40 MILLIGRAM(S): 80 TABLET, FILM COATED ORAL at 22:57

## 2019-09-08 RX ADMIN — Medication 0.25 MILLIGRAM(S): at 22:58

## 2019-09-08 RX ADMIN — SODIUM CHLORIDE 1000 MILLILITER(S): 9 INJECTION, SOLUTION INTRAVENOUS at 12:12

## 2019-09-08 RX ADMIN — Medication 3 MILLILITER(S): at 09:31

## 2019-09-08 RX ADMIN — Medication 1: at 17:32

## 2019-09-08 RX ADMIN — TAMSULOSIN HYDROCHLORIDE 0.4 MILLIGRAM(S): 0.4 CAPSULE ORAL at 22:57

## 2019-09-08 RX ADMIN — INSULIN GLARGINE 34 UNIT(S): 100 INJECTION, SOLUTION SUBCUTANEOUS at 23:05

## 2019-09-08 RX ADMIN — CEFTRIAXONE 100 MILLIGRAM(S): 500 INJECTION, POWDER, FOR SOLUTION INTRAMUSCULAR; INTRAVENOUS at 09:50

## 2019-09-08 RX ADMIN — SODIUM CHLORIDE 500 MILLILITER(S): 9 INJECTION INTRAMUSCULAR; INTRAVENOUS; SUBCUTANEOUS at 11:10

## 2019-09-08 RX ADMIN — Medication 975 MILLIGRAM(S): at 20:14

## 2019-09-08 RX ADMIN — LOSARTAN POTASSIUM 25 MILLIGRAM(S): 100 TABLET, FILM COATED ORAL at 22:57

## 2019-09-08 RX ADMIN — SODIUM CHLORIDE 500 MILLILITER(S): 9 INJECTION INTRAMUSCULAR; INTRAVENOUS; SUBCUTANEOUS at 20:13

## 2019-09-08 RX ADMIN — Medication 3 MILLILITER(S): at 18:17

## 2019-09-08 RX ADMIN — MONTELUKAST 10 MILLIGRAM(S): 4 TABLET, CHEWABLE ORAL at 16:51

## 2019-09-08 NOTE — ED ADULT NURSE REASSESSMENT NOTE - NS ED NURSE REASSESS COMMENT FT1
pt returned from radiology place on continuous cardiac monitering pt remains alert verbal 3liters nasal canula in use with 2nd iv bolus 500ml in progress family at bedside droplet iso maintained

## 2019-09-08 NOTE — ED PROVIDER NOTE - CARE PLAN
Principal Discharge DX:	Pneumonia  Secondary Diagnosis:	KARLOS (acute kidney injury)  Secondary Diagnosis:	Fever

## 2019-09-08 NOTE — ED PROVIDER NOTE - PROGRESS NOTE DETAILS
Labs with KARLOS, hypokalemia.  Tolerating first 500 cc bolus well.  Will bolus 2nd 500 cc, also PO potassium.  Pending CXR at this time.   Lactate elevated to 2.8.  Will repeat after second 500cc bolus to guide further resuscitation.  Zain Grande M.D. patient with HR 89, bp 112/50, resting comfortably, got 1 L , repeating lactate now. - Jane Oliva PA-C Lactate rising after 1L intravenous fluids.  CXR read as negative however still suspect RLL PNA clinically.  CT chest ordered.  Patient admitted hospitalist, however in setting of rising lactate will continue intravenous fluids resucitation in Emergency Department. Zain Grande M.D.

## 2019-09-08 NOTE — H&P ADULT - HISTORY OF PRESENT ILLNESS
Patient presentiing  with generalized weakness.    h/o  afib/  on coumadin,  ,  htn, chf,  dm 2 , bph     Per patient "pressed the wrong button on my power chair" and slide down to the ground, then couldn't get up.  Family found him in morning and called EMS, believes he was on the ground for hours.   Reporting generalized weakness/malaise, some shortness of breath/cough and some mild generalized abdominal pains.   Family noted him to be vomiting this morning.  No recent travel, no sick contacts.  Slightly slurred speech at baseline per family.

## 2019-09-08 NOTE — ED PROVIDER NOTE - ATTENDING CONTRIBUTION TO CARE
Patient seen and evaluated with resident/NP/PA, however HPI, ROS, PE and MDM as documented authored by myself unless otherwise noted- Zain Grande MD

## 2019-09-08 NOTE — ED PROVIDER NOTE - CONSTITUTIONAL, MLM
normal... Ill appearing but non toxic, awake, alert, oriented to person, place, time/situation.  Febrile rectally, tachycardic, hypoxic on arrival

## 2019-09-08 NOTE — ED ADULT TRIAGE NOTE - CHIEF COMPLAINT QUOTE
witnessed fall off couch, no head injury or LOC. Patient c/o generalized weakness. Patient on coumadin and aspirin 81mg

## 2019-09-08 NOTE — ED PROVIDER NOTE - OBJECTIVE STATEMENT
Patient presenting BIBEMS with generalized weakness.  Per patient "pressed the wrong button on my power chair" and slide down to the ground, then couldn't get up.  Family found him in morning and called EMS, believes he was on the ground for hours.  Reporting generalized weakness/malaise, some shortness of breath/cough and some mild generalized abdominal pains.  Family noted him to be vomiting this morning.  No recent travel, no sick contacts.  Slightly slurred speech at baseline per family.

## 2019-09-08 NOTE — H&P ADULT - NSHPLABSRESULTS_GEN_ALL_CORE
LABS:                        14.1   14.9  )-----------( 172      ( 08 Sep 2019 09:22 )             42.1     09-08    142  |  98  |  57<H>  ----------------------------<  270<H>  3.0<L>   |  27  |  2.09<H>    Ca    9.2      08 Sep 2019 09:22    TPro  6.8  /  Alb  3.6  /  TBili  0.6  /  DBili  x   /  AST  11  /  ALT  13  /  AlkPhos  76  09-08    PT/INR - ( 08 Sep 2019 09:22 )   PT: 47.5 sec;   INR: 3.96 ratio         PTT - ( 08 Sep 2019 09:22 )  PTT:43.0 sec  CARDIAC MARKERS ( 08 Sep 2019 09:22 )  x     / x     / 81 U/L / x     / x                09-08 @ 11:46  2.9  33    Hemoglobin A1C, Whole Blood: 8.6 % (06-11 @ 08:55)

## 2019-09-08 NOTE — ED PROVIDER NOTE - NEUROLOGICAL, MLM
Alert and oriented, L wrist weakness but no arm drift, LE generally weak bilaterally but non focal, slightly slurred speech but understandable

## 2019-09-08 NOTE — ED ADULT NURSE NOTE - OBJECTIVE STATEMENT
pt 83 yo male from home via EMS pt slipped off chair was on floor for couple of hours pt streoke hx left side weaknesss with vomiting through the night per wife hx copd no oxygen use at home pt alert denies any pain pt states he pushed wrong button on chair and slipped to floor pt abd large vomitted x 4 per patient normal bowel movement yesterday wexp wheeze ascultated right side skin warm to touvh rectal temp 102.3 pt placed on continuous cardiac monitering upt placed on 3liters nasal canula on arrival for saturation 90% on room air pt states generalized weakness labs sent as ordered family at bedise

## 2019-09-08 NOTE — ED ADULT NURSE REASSESSMENT NOTE - NS ED NURSE REASSESS COMMENT FT1
pt given hygeine care pt pending room assignment remains alert verbal watching  tv oxygen 2 liters via nasal canula in use pt no complaints at this time no vomiting

## 2019-09-08 NOTE — H&P ADULT - NSHPPHYSICALEXAM_GEN_ALL_CORE
PHYSICAL EXAMINATION:  Vital Signs Last 24 Hrs  T(C): 39.1 (08 Sep 2019 09:33), Max: 39.1 (08 Sep 2019 09:33)  T(F): 102.3 (08 Sep 2019 09:33), Max: 102.3 (08 Sep 2019 09:33)  HR: 89 (08 Sep 2019 12:02) (89 - 107)  BP: 103/56 (08 Sep 2019 12:02) (96/55 - 132/76)  BP(mean): --  RR: 21 (08 Sep 2019 12:02) (18 - 28)  SpO2: 95% (08 Sep 2019 12:02) (90% - 100%)  CAPILLARY BLOOD GLUCOSE      POCT Blood Glucose.: 228 mg/dL (08 Sep 2019 09:03)        GENERAL: NAD, well-groomed,  HEAD:  atraumatic, normocephalic  EYES: sclera anicteric  ENMT: mucous membranes moist  NECK: supple, No JVD  CHEST/LUNG: clear to auscultation bilaterally;    no      rales   ,   no rhonchi,   HEART: normal S1, S2  ABDOMEN: BS+, soft, ND, NT   EXTREMITIES:    no    edema    b/l LEs  NEURO: awake, ,     moves all extremities  SKIN: no     rash

## 2019-09-08 NOTE — ED PROVIDER NOTE - CRITICAL CARE PROVIDED
direct patient care (not related to procedure)/consult w/ pt's family directly relating to pts condition/interpretation of diagnostic studies/documentation/consultation with other physicians/additional history taking

## 2019-09-08 NOTE — ED PROVIDER NOTE - CLINICAL SUMMARY MEDICAL DECISION MAKING FREE TEXT BOX
Patient presenting with generalized weakness and inability to stand, found to be febrile in Emergency Department with focal lung sounds at R base.  Suspect possible URI vs PNA.  Clinically likely septic/bacteremic - however review of prior records reveals CHF history with EF of 40% in 2017, given already demonstrating respiratory distress will hold on 30 cc/kg intravenous fluids bolus to avoid iatrogenic injury, will bolus 500cc at a time a reassess between.  Will treat empirically for PNA with antibiotics, nebulizers, acetaminophen for fevers.  Plan for pan cultures and RVP.  Also will obtain CK given down time and early decubitus noted on exam.  CXR for PNA, CT head/C spine for screening for occult traumatic injury, XR pelvis for same.  Will require admission for further care.

## 2019-09-09 LAB
ANION GAP SERPL CALC-SCNC: 10 MMOL/L — SIGNIFICANT CHANGE UP (ref 5–17)
ANION GAP SERPL CALC-SCNC: 14 MMOL/L — SIGNIFICANT CHANGE UP (ref 5–17)
BUN SERPL-MCNC: 37 MG/DL — HIGH (ref 7–23)
BUN SERPL-MCNC: 47 MG/DL — HIGH (ref 7–23)
CALCIUM SERPL-MCNC: 8.3 MG/DL — LOW (ref 8.4–10.5)
CALCIUM SERPL-MCNC: 8.5 MG/DL — SIGNIFICANT CHANGE UP (ref 8.4–10.5)
CHLORIDE SERPL-SCNC: 102 MMOL/L — SIGNIFICANT CHANGE UP (ref 96–108)
CHLORIDE SERPL-SCNC: 103 MMOL/L — SIGNIFICANT CHANGE UP (ref 96–108)
CO2 SERPL-SCNC: 26 MMOL/L — SIGNIFICANT CHANGE UP (ref 22–31)
CO2 SERPL-SCNC: 29 MMOL/L — SIGNIFICANT CHANGE UP (ref 22–31)
CREAT SERPL-MCNC: 1.39 MG/DL — HIGH (ref 0.5–1.3)
CREAT SERPL-MCNC: 1.61 MG/DL — HIGH (ref 0.5–1.3)
CULTURE RESULTS: SIGNIFICANT CHANGE UP
GLUCOSE BLDC GLUCOMTR-MCNC: 108 MG/DL — HIGH (ref 70–99)
GLUCOSE BLDC GLUCOMTR-MCNC: 135 MG/DL — HIGH (ref 70–99)
GLUCOSE BLDC GLUCOMTR-MCNC: 211 MG/DL — HIGH (ref 70–99)
GLUCOSE BLDC GLUCOMTR-MCNC: 213 MG/DL — HIGH (ref 70–99)
GLUCOSE SERPL-MCNC: 130 MG/DL — HIGH (ref 70–99)
GLUCOSE SERPL-MCNC: 160 MG/DL — HIGH (ref 70–99)
HBA1C BLD-MCNC: 9.2 % — HIGH (ref 4–5.6)
HCT VFR BLD CALC: 37.1 % — LOW (ref 39–50)
HGB BLD-MCNC: 11.7 G/DL — LOW (ref 13–17)
INR BLD: 3.98 RATIO — HIGH (ref 0.88–1.16)
MCHC RBC-ENTMCNC: 29.5 PG — SIGNIFICANT CHANGE UP (ref 27–34)
MCHC RBC-ENTMCNC: 31.5 GM/DL — LOW (ref 32–36)
MCV RBC AUTO: 93.5 FL — SIGNIFICANT CHANGE UP (ref 80–100)
PLATELET # BLD AUTO: 153 K/UL — SIGNIFICANT CHANGE UP (ref 150–400)
POTASSIUM SERPL-MCNC: 3 MMOL/L — LOW (ref 3.5–5.3)
POTASSIUM SERPL-MCNC: 3.7 MMOL/L — SIGNIFICANT CHANGE UP (ref 3.5–5.3)
POTASSIUM SERPL-SCNC: 3 MMOL/L — LOW (ref 3.5–5.3)
POTASSIUM SERPL-SCNC: 3.7 MMOL/L — SIGNIFICANT CHANGE UP (ref 3.5–5.3)
PROTHROM AB SERPL-ACNC: 47.3 SEC — HIGH (ref 10–12.9)
RBC # BLD: 3.97 M/UL — LOW (ref 4.2–5.8)
RBC # FLD: 14 % — SIGNIFICANT CHANGE UP (ref 10.3–14.5)
SODIUM SERPL-SCNC: 142 MMOL/L — SIGNIFICANT CHANGE UP (ref 135–145)
SODIUM SERPL-SCNC: 142 MMOL/L — SIGNIFICANT CHANGE UP (ref 135–145)
SPECIMEN SOURCE: SIGNIFICANT CHANGE UP
WBC # BLD: 10.99 K/UL — HIGH (ref 3.8–10.5)
WBC # FLD AUTO: 10.99 K/UL — HIGH (ref 3.8–10.5)

## 2019-09-09 PROCEDURE — 76770 US EXAM ABDO BACK WALL COMP: CPT | Mod: 26

## 2019-09-09 RX ORDER — POTASSIUM CHLORIDE 20 MEQ
20 PACKET (EA) ORAL ONCE
Refills: 0 | Status: COMPLETED | OUTPATIENT
Start: 2019-09-09 | End: 2019-09-09

## 2019-09-09 RX ORDER — INSULIN GLARGINE 100 [IU]/ML
32 INJECTION, SOLUTION SUBCUTANEOUS AT BEDTIME
Refills: 0 | Status: DISCONTINUED | OUTPATIENT
Start: 2019-09-09 | End: 2019-09-10

## 2019-09-09 RX ORDER — POTASSIUM CHLORIDE 20 MEQ
10 PACKET (EA) ORAL
Refills: 0 | Status: DISCONTINUED | OUTPATIENT
Start: 2019-09-09 | End: 2019-09-09

## 2019-09-09 RX ADMIN — MONTELUKAST 10 MILLIGRAM(S): 4 TABLET, CHEWABLE ORAL at 12:41

## 2019-09-09 RX ADMIN — Medication 3 MILLILITER(S): at 12:42

## 2019-09-09 RX ADMIN — Medication 100 MILLIEQUIVALENT(S): at 10:00

## 2019-09-09 RX ADMIN — Medication 2: at 12:42

## 2019-09-09 RX ADMIN — TAMSULOSIN HYDROCHLORIDE 0.4 MILLIGRAM(S): 0.4 CAPSULE ORAL at 21:22

## 2019-09-09 RX ADMIN — Medication 100 MILLIEQUIVALENT(S): at 08:35

## 2019-09-09 RX ADMIN — Medication 20 MILLIGRAM(S): at 06:04

## 2019-09-09 RX ADMIN — ATORVASTATIN CALCIUM 40 MILLIGRAM(S): 80 TABLET, FILM COATED ORAL at 21:22

## 2019-09-09 RX ADMIN — Medication 20 MILLIEQUIVALENT(S): at 08:35

## 2019-09-09 RX ADMIN — LOSARTAN POTASSIUM 25 MILLIGRAM(S): 100 TABLET, FILM COATED ORAL at 06:04

## 2019-09-09 RX ADMIN — Medication 3 MILLILITER(S): at 19:31

## 2019-09-09 RX ADMIN — AZITHROMYCIN 500 MILLIGRAM(S): 500 TABLET, FILM COATED ORAL at 14:30

## 2019-09-09 RX ADMIN — Medication 3 MILLILITER(S): at 06:03

## 2019-09-09 RX ADMIN — CEFTRIAXONE 100 MILLIGRAM(S): 500 INJECTION, POWDER, FOR SOLUTION INTRAMUSCULAR; INTRAVENOUS at 12:40

## 2019-09-09 RX ADMIN — FINASTERIDE 5 MILLIGRAM(S): 5 TABLET, FILM COATED ORAL at 12:41

## 2019-09-09 RX ADMIN — Medication 81 MILLIGRAM(S): at 12:46

## 2019-09-09 RX ADMIN — INSULIN GLARGINE 32 UNIT(S): 100 INJECTION, SOLUTION SUBCUTANEOUS at 21:29

## 2019-09-09 RX ADMIN — Medication 20 MILLIEQUIVALENT(S): at 12:41

## 2019-09-09 NOTE — PROGRESS NOTE ADULT - SUBJECTIVE AND OBJECTIVE BOX
note re written/  cancelled  in error  by rosmery selby better/ no cough    REVIEW OF SYSTEMS:  GEN: no fever,    no chills  RESP: no SOB,   no cough  CVS: no chest pain,   no palpitations  GI: no abdominal pain,   no nausea,   no vomiting,   no constipation,   no diarrhea  : no dysuria,   no frequency  NEURO: no headache,   no dizziness  PSYCH: no depression,   not anxious  Derm : no rash    MEDICATIONS  (STANDING):  ALBUTerol/ipratropium for Nebulization 3 milliLiter(s) Nebulizer every 6 hours  aspirin  chewable 81 milliGRAM(s) Oral daily  atorvastatin 40 milliGRAM(s) Oral at bedtime  azithromycin   Tablet 500 milliGRAM(s) Oral daily  buDESOnide    Inhalation Suspension 0.25 milliGRAM(s) Inhalation two times a day  cefTRIAXone   IVPB 1000 milliGRAM(s) IV Intermittent every 24 hours  dextrose 5%. 1000 milliLiter(s) (50 mL/Hr) IV Continuous <Continuous>  dextrose 50% Injectable 12.5 Gram(s) IV Push once  dextrose 50% Injectable 25 Gram(s) IV Push once  dextrose 50% Injectable 25 Gram(s) IV Push once  finasteride 5 milliGRAM(s) Oral daily  furosemide    Tablet 20 milliGRAM(s) Oral daily  insulin glargine Injectable (LANTUS) 32 Unit(s) SubCutaneous at bedtime  insulin lispro (HumaLOG) corrective regimen sliding scale   SubCutaneous three times a day before meals  losartan 25 milliGRAM(s) Oral daily  montelukast 10 milliGRAM(s) Oral daily  potassium chloride  10 mEq/100 mL IVPB 10 milliEquivalent(s) IV Intermittent every 1 hour  tamsulosin 0.4 milliGRAM(s) Oral at bedtime  tiotropium 18 MICROgram(s) Capsule 1 Capsule(s) Inhalation daily    MEDICATIONS  (PRN):  dextrose 40% Gel 15 Gram(s) Oral once PRN Blood Glucose LESS THAN 70 milliGRAM(s)/deciliter  glucagon  Injectable 1 milliGRAM(s) IntraMuscular once PRN Glucose LESS THAN 70 milligrams/deciliter      Vital Signs Last 24 Hrs  T(C): 36.6 (09 Sep 2019 04:55), Max: 37.1 (08 Sep 2019 14:39)  T(F): 97.8 (09 Sep 2019 04:55), Max: 98.8 (08 Sep 2019 14:39)  HR: 62 (09 Sep 2019 04:55) (62 - 94)  BP: 120/68 (09 Sep 2019 04:55) (96/55 - 141/74)  BP(mean): --  RR: 20 (09 Sep 2019 04:55) (14 - 24)  SpO2: 93% (09 Sep 2019 04:55) (93% - 100%)  CAPILLARY BLOOD GLUCOSE      POCT Blood Glucose.: 108 mg/dL (09 Sep 2019 09:02)  POCT Blood Glucose.: 182 mg/dL (08 Sep 2019 23:04)  POCT Blood Glucose.: 174 mg/dL (08 Sep 2019 21:19)  POCT Blood Glucose.: 193 mg/dL (08 Sep 2019 17:23)    I&O's Summary      PHYSICAL EXAM:  HEAD:  Atraumatic, Normocephalic  NECK: Supple, No   JVD  CHEST/LUNG:   no     rales,     no,    rhonchi  HEART: Regular rate and rhythm;         murmur  ABDOMEN: Soft, Nontender, ;   EXTREMITIES:     no   edema  NEUROLOGY:  alert    LABS:                        11.7   10.99 )-----------( 153      ( 09 Sep 2019 08:16 )             37.1         142  |  102  |  47<H>  ----------------------------<  130<H>  3.0<L>   |  26  |  1.61<H>    Ca    8.3<L>      09 Sep 2019 04:54    TPro  6.8  /  Alb  3.6  /  TBili  0.6  /  DBili  x   /  AST  11  /  ALT  13  /  AlkPhos  76  0908    PT/INR - ( 09 Sep 2019 09:09 )   PT: 47.3 sec;   INR: 3.98 ratio         PTT - ( 08 Sep 2019 09:22 )  PTT:43.0 sec  CARDIAC MARKERS ( 08 Sep 2019 09:22 )  x     / x     / 81 U/L / x     / x          Urinalysis Basic - ( 08 Sep 2019 13:40 )    Color: Yellow / Appearance: Clear / S.016 / pH: x  Gluc: x / Ketone: Negative  / Bili: Negative / Urobili: Negative   Blood: x / Protein: Trace / Nitrite: Negative   Leuk Esterase: Negative / RBC: 1 /hpf / WBC 1 /HPF   Sq Epi: x / Non Sq Epi: 0 /hpf / Bacteria: Negative           @ 13:45  2.7  24    Hemoglobin A1C, Whole Blood: 9.2 % ( @ 08:16)            Consultant(s) Notes Reviewed:      Care Discussed with Consultants/Other Providers:

## 2019-09-09 NOTE — PROGRESS NOTE ADULT - SUBJECTIVE AND OBJECTIVE BOX
INTERVAL HPI/OVERNIGHT EVENTS:  Pt seen and examined at bedside.     Allergies/Intolerance: No Known Allergies      MEDICATIONS  (STANDING):  ALBUTerol    90 MICROgram(s) HFA Inhaler 1 Puff(s) Inhalation every 4 hours  ALBUTerol/ipratropium for Nebulization 3 milliLiter(s) Nebulizer every 6 hours  aspirin  chewable 81 milliGRAM(s) Oral daily  atorvastatin 40 milliGRAM(s) Oral at bedtime  azithromycin   Tablet 500 milliGRAM(s) Oral daily  buDESOnide    Inhalation Suspension 0.25 milliGRAM(s) Inhalation two times a day  cefTRIAXone   IVPB 1000 milliGRAM(s) IV Intermittent every 24 hours  dextrose 5%. 1000 milliLiter(s) (50 mL/Hr) IV Continuous <Continuous>  dextrose 50% Injectable 12.5 Gram(s) IV Push once  dextrose 50% Injectable 25 Gram(s) IV Push once  dextrose 50% Injectable 25 Gram(s) IV Push once  finasteride 5 milliGRAM(s) Oral daily  furosemide    Tablet 20 milliGRAM(s) Oral daily  insulin glargine Injectable (LANTUS) 34 Unit(s) SubCutaneous at bedtime  insulin lispro (HumaLOG) corrective regimen sliding scale   SubCutaneous three times a day before meals  losartan 25 milliGRAM(s) Oral daily  montelukast 10 milliGRAM(s) Oral daily  potassium chloride    Tablet ER 20 milliEquivalent(s) Oral once  potassium chloride  10 mEq/100 mL IVPB 10 milliEquivalent(s) IV Intermittent every 1 hour  tamsulosin 0.4 milliGRAM(s) Oral at bedtime  tiotropium 18 MICROgram(s) Capsule 1 Capsule(s) Inhalation daily    MEDICATIONS  (PRN):  dextrose 40% Gel 15 Gram(s) Oral once PRN Blood Glucose LESS THAN 70 milliGRAM(s)/deciliter  glucagon  Injectable 1 milliGRAM(s) IntraMuscular once PRN Glucose LESS THAN 70 milligrams/deciliter        ROS: all systems reviewed and wnl      PHYSICAL EXAMINATION:  Vital Signs Last 24 Hrs  T(C): 36.6 (09 Sep 2019 04:55), Max: 39.1 (08 Sep 2019 09:33)  T(F): 97.8 (09 Sep 2019 04:55), Max: 102.3 (08 Sep 2019 09:33)  HR: 62 (09 Sep 2019 04:55) (62 - 107)  BP: 120/68 (09 Sep 2019 04:55) (96/55 - 141/74)  BP(mean): --  RR: 20 (09 Sep 2019 04:55) (14 - 28)  SpO2: 93% (09 Sep 2019 04:55) (90% - 100%)  CAPILLARY BLOOD GLUCOSE      POCT Blood Glucose.: 182 mg/dL (08 Sep 2019 23:04)  POCT Blood Glucose.: 174 mg/dL (08 Sep 2019 21:19)  POCT Blood Glucose.: 193 mg/dL (08 Sep 2019 17:23)  POCT Blood Glucose.: 228 mg/dL (08 Sep 2019 09:03)        GENERAL:   NECK: supple, No JVD  CHEST/LUNG: clear to auscultation bilaterally; no rales, rhonchi, or wheezing b/l  HEART: normal S1, S2  ABDOMEN: BS+, soft, ND, NT   EXTREMITIES:  pulses palpable; no clubbing, cyanosis, or edema b/l LEs  SKIN: no rashes or lesions      LABS:                        14.1   14.9  )-----------( 172      ( 08 Sep 2019 09:22 )             42.1     09    142  |  102  |  47<H>  ----------------------------<  130<H>  3.0<L>   |  26  |  1.61<H>    Ca    8.3<L>      09 Sep 2019 04:54    TPro  6.8  /  Alb  3.6  /  TBili  0.6  /  DBili  x   /  AST  11  /  ALT  13  /  AlkPhos  76  09-08    PT/INR - ( 08 Sep 2019 09:22 )   PT: 47.5 sec;   INR: 3.96 ratio         PTT - ( 08 Sep 2019 09:22 )  PTT:43.0 sec  Urinalysis Basic - ( 08 Sep 2019 13:40 )    Color: Yellow / Appearance: Clear / S.016 / pH: x  Gluc: x / Ketone: Negative  / Bili: Negative / Urobili: Negative   Blood: x / Protein: Trace / Nitrite: Negative   Leuk Esterase: Negative / RBC: 1 /hpf / WBC 1 /HPF   Sq Epi: x / Non Sq Epi: 0 /hpf / Bacteria: Negative

## 2019-09-09 NOTE — PROGRESS NOTE ADULT - SUBJECTIVE AND OBJECTIVE BOX
less  cough    REVIEW OF SYSTEMS:  GEN: no fever,    no chills  RESP: no SOB,   no cough  CVS: no chest pain,   no palpitations  GI: no abdominal pain,   no nausea,   no vomiting,   no constipation,   no diarrhea  : no dysuria,   no frequency  NEURO: no headache,   no dizziness  PSYCH: no depression,   not anxious  Derm : no rash    MEDICATIONS  (STANDING):  ALBUTerol    90 MICROgram(s) HFA Inhaler 1 Puff(s) Inhalation every 4 hours  ALBUTerol/ipratropium for Nebulization 3 milliLiter(s) Nebulizer every 6 hours  aspirin  chewable 81 milliGRAM(s) Oral daily  atorvastatin 40 milliGRAM(s) Oral at bedtime  azithromycin   Tablet 500 milliGRAM(s) Oral daily  buDESOnide    Inhalation Suspension 0.25 milliGRAM(s) Inhalation two times a day  cefTRIAXone   IVPB 1000 milliGRAM(s) IV Intermittent every 24 hours  dextrose 5%. 1000 milliLiter(s) (50 mL/Hr) IV Continuous <Continuous>  dextrose 50% Injectable 12.5 Gram(s) IV Push once  dextrose 50% Injectable 25 Gram(s) IV Push once  dextrose 50% Injectable 25 Gram(s) IV Push once  finasteride 5 milliGRAM(s) Oral daily  furosemide    Tablet 20 milliGRAM(s) Oral daily  insulin glargine Injectable (LANTUS) 34 Unit(s) SubCutaneous at bedtime  insulin lispro (HumaLOG) corrective regimen sliding scale   SubCutaneous three times a day before meals  losartan 25 milliGRAM(s) Oral daily  montelukast 10 milliGRAM(s) Oral daily  potassium chloride    Tablet ER 20 milliEquivalent(s) Oral once  potassium chloride  10 mEq/100 mL IVPB 10 milliEquivalent(s) IV Intermittent every 1 hour  tamsulosin 0.4 milliGRAM(s) Oral at bedtime  tiotropium 18 MICROgram(s) Capsule 1 Capsule(s) Inhalation daily    MEDICATIONS  (PRN):  dextrose 40% Gel 15 Gram(s) Oral once PRN Blood Glucose LESS THAN 70 milliGRAM(s)/deciliter  glucagon  Injectable 1 milliGRAM(s) IntraMuscular once PRN Glucose LESS THAN 70 milligrams/deciliter      Vital Signs Last 24 Hrs  T(C): 36.6 (09 Sep 2019 04:55), Max: 39.1 (08 Sep 2019 09:33)  T(F): 97.8 (09 Sep 2019 04:55), Max: 102.3 (08 Sep 2019 09:33)  HR: 62 (09 Sep 2019 04:55) (62 - 107)  BP: 120/68 (09 Sep 2019 04:55) (96/55 - 141/74)  BP(mean): --  RR: 20 (09 Sep 2019 04:55) (14 - 28)  SpO2: 93% (09 Sep 2019 04:55) (90% - 100%)  CAPILLARY BLOOD GLUCOSE      POCT Blood Glucose.: 182 mg/dL (08 Sep 2019 23:04)  POCT Blood Glucose.: 174 mg/dL (08 Sep 2019 21:19)  POCT Blood Glucose.: 193 mg/dL (08 Sep 2019 17:23)  POCT Blood Glucose.: 228 mg/dL (08 Sep 2019 09:03)    I&O's Summary      PHYSICAL EXAM:  HEAD:  Atraumatic, Normocephalic  NECK: Supple, No   JVD  CHEST/LUNG:   no     rales,     no,    rhonchi  HEART: Regular rate and rhythm;         murmur  ABDOMEN: Soft, Nontender, ;   EXTREMITIES:   no     edema  NEUROLOGY:  alert    LABS:                        14.1   14.9  )-----------( 172      ( 08 Sep 2019 09:22 )             42.1     09    142  |  102  |  47<H>  ----------------------------<  130<H>  3.0<L>   |  26  |  1.61<H>    Ca    8.3<L>      09 Sep 2019 04:54    TPro  6.8  /  Alb  3.6  /  TBili  0.6  /  DBili  x   /  AST  11  /  ALT  13  /  AlkPhos  76  09-08    PT/INR - ( 08 Sep 2019 09:22 )   PT: 47.5 sec;   INR: 3.96 ratio         PTT - ( 08 Sep 2019 09:22 )  PTT:43.0 sec  CARDIAC MARKERS ( 08 Sep 2019 09:22 )  x     / x     / 81 U/L / x     / x          Urinalysis Basic - ( 08 Sep 2019 13:40 )    Color: Yellow / Appearance: Clear / S.016 / pH: x  Gluc: x / Ketone: Negative  / Bili: Negative / Urobili: Negative   Blood: x / Protein: Trace / Nitrite: Negative   Leuk Esterase: Negative / RBC: 1 /hpf / WBC 1 /HPF   Sq Epi: x / Non Sq Epi: 0 /hpf / Bacteria: Negative           @ 13:45  2.7  24    Hemoglobin A1C, Whole Blood: 8.6 % ( @ 08:55)            Consultant(s) Notes Reviewed:      Care Discussed with Consultants/Other Providers:

## 2019-09-10 LAB
ANION GAP SERPL CALC-SCNC: 11 MMOL/L — SIGNIFICANT CHANGE UP (ref 5–17)
APTT BLD: 40 SEC — HIGH (ref 27.5–36.3)
BUN SERPL-MCNC: 32 MG/DL — HIGH (ref 7–23)
CALCIUM SERPL-MCNC: 8.9 MG/DL — SIGNIFICANT CHANGE UP (ref 8.4–10.5)
CHLORIDE SERPL-SCNC: 105 MMOL/L — SIGNIFICANT CHANGE UP (ref 96–108)
CO2 SERPL-SCNC: 30 MMOL/L — SIGNIFICANT CHANGE UP (ref 22–31)
CREAT SERPL-MCNC: 1.39 MG/DL — HIGH (ref 0.5–1.3)
GLUCOSE BLDC GLUCOMTR-MCNC: 155 MG/DL — HIGH (ref 70–99)
GLUCOSE BLDC GLUCOMTR-MCNC: 162 MG/DL — HIGH (ref 70–99)
GLUCOSE BLDC GLUCOMTR-MCNC: 211 MG/DL — HIGH (ref 70–99)
GLUCOSE BLDC GLUCOMTR-MCNC: 84 MG/DL — SIGNIFICANT CHANGE UP (ref 70–99)
GLUCOSE SERPL-MCNC: 73 MG/DL — SIGNIFICANT CHANGE UP (ref 70–99)
HCT VFR BLD CALC: 36.2 % — LOW (ref 39–50)
HGB BLD-MCNC: 11.8 G/DL — LOW (ref 13–17)
INR BLD: 2.7 RATIO — HIGH (ref 0.88–1.16)
MCHC RBC-ENTMCNC: 31.1 PG — SIGNIFICANT CHANGE UP (ref 27–34)
MCHC RBC-ENTMCNC: 32.6 GM/DL — SIGNIFICANT CHANGE UP (ref 32–36)
MCV RBC AUTO: 95.3 FL — SIGNIFICANT CHANGE UP (ref 80–100)
PLATELET # BLD AUTO: 158 K/UL — SIGNIFICANT CHANGE UP (ref 150–400)
POTASSIUM SERPL-MCNC: 3.3 MMOL/L — LOW (ref 3.5–5.3)
POTASSIUM SERPL-SCNC: 3.3 MMOL/L — LOW (ref 3.5–5.3)
PROTHROM AB SERPL-ACNC: 31.5 SEC — HIGH (ref 10–13.1)
RBC # BLD: 3.8 M/UL — LOW (ref 4.2–5.8)
RBC # FLD: 13.9 % — SIGNIFICANT CHANGE UP (ref 10.3–14.5)
SODIUM SERPL-SCNC: 146 MMOL/L — HIGH (ref 135–145)
WBC # BLD: 7.96 K/UL — SIGNIFICANT CHANGE UP (ref 3.8–10.5)
WBC # FLD AUTO: 7.96 K/UL — SIGNIFICANT CHANGE UP (ref 3.8–10.5)

## 2019-09-10 RX ORDER — POTASSIUM CHLORIDE 20 MEQ
20 PACKET (EA) ORAL ONCE
Refills: 0 | Status: DISCONTINUED | OUTPATIENT
Start: 2019-09-10 | End: 2019-09-10

## 2019-09-10 RX ORDER — BUDESONIDE, MICRONIZED 100 %
0.5 POWDER (GRAM) MISCELLANEOUS
Refills: 0 | Status: DISCONTINUED | OUTPATIENT
Start: 2019-09-10 | End: 2019-09-11

## 2019-09-10 RX ORDER — INFLUENZA VIRUS VACCINE 15; 15; 15; 15 UG/.5ML; UG/.5ML; UG/.5ML; UG/.5ML
0.5 SUSPENSION INTRAMUSCULAR ONCE
Refills: 0 | Status: COMPLETED | OUTPATIENT
Start: 2019-09-10 | End: 2019-09-11

## 2019-09-10 RX ORDER — INSULIN GLARGINE 100 [IU]/ML
25 INJECTION, SOLUTION SUBCUTANEOUS AT BEDTIME
Refills: 0 | Status: DISCONTINUED | OUTPATIENT
Start: 2019-09-10 | End: 2019-09-11

## 2019-09-10 RX ORDER — POTASSIUM CHLORIDE 20 MEQ
40 PACKET (EA) ORAL ONCE
Refills: 0 | Status: COMPLETED | OUTPATIENT
Start: 2019-09-10 | End: 2019-09-10

## 2019-09-10 RX ADMIN — Medication 40 MILLIEQUIVALENT(S): at 09:29

## 2019-09-10 RX ADMIN — AZITHROMYCIN 500 MILLIGRAM(S): 500 TABLET, FILM COATED ORAL at 11:48

## 2019-09-10 RX ADMIN — Medication 3 MILLILITER(S): at 17:23

## 2019-09-10 RX ADMIN — MONTELUKAST 10 MILLIGRAM(S): 4 TABLET, CHEWABLE ORAL at 11:47

## 2019-09-10 RX ADMIN — ATORVASTATIN CALCIUM 40 MILLIGRAM(S): 80 TABLET, FILM COATED ORAL at 21:31

## 2019-09-10 RX ADMIN — Medication 81 MILLIGRAM(S): at 11:48

## 2019-09-10 RX ADMIN — Medication 1: at 17:51

## 2019-09-10 RX ADMIN — Medication 1: at 13:54

## 2019-09-10 RX ADMIN — Medication 3 MILLILITER(S): at 11:48

## 2019-09-10 RX ADMIN — Medication 3 MILLILITER(S): at 05:29

## 2019-09-10 RX ADMIN — Medication 20 MILLIGRAM(S): at 05:29

## 2019-09-10 RX ADMIN — LOSARTAN POTASSIUM 25 MILLIGRAM(S): 100 TABLET, FILM COATED ORAL at 05:29

## 2019-09-10 RX ADMIN — INSULIN GLARGINE 25 UNIT(S): 100 INJECTION, SOLUTION SUBCUTANEOUS at 21:31

## 2019-09-10 RX ADMIN — TAMSULOSIN HYDROCHLORIDE 0.4 MILLIGRAM(S): 0.4 CAPSULE ORAL at 21:31

## 2019-09-10 RX ADMIN — CEFTRIAXONE 100 MILLIGRAM(S): 500 INJECTION, POWDER, FOR SOLUTION INTRAMUSCULAR; INTRAVENOUS at 09:29

## 2019-09-10 RX ADMIN — FINASTERIDE 5 MILLIGRAM(S): 5 TABLET, FILM COATED ORAL at 11:47

## 2019-09-10 RX ADMIN — Medication 0.25 MILLIGRAM(S): at 05:30

## 2019-09-10 RX ADMIN — Medication 0.5 MILLIGRAM(S): at 17:36

## 2019-09-10 NOTE — PHYSICAL THERAPY INITIAL EVALUATION ADULT - ADDITIONAL COMMENTS
as per pt, resides in an APT with spouse, bedroom on 2nd floor with railings, PTA, pt amb (I) with RW, distance limited to 50ft, required assist for ADls from spouse

## 2019-09-10 NOTE — PATIENT PROFILE ADULT - TRANSPORTATION
October 3, 2017    [address] [facility]    Dear Arcenio Puente: It was a pleasure speaking with you over the phone recently.  To follow up, I wanted to send you some contact information to utilize when you have a question a
no

## 2019-09-10 NOTE — PHYSICAL THERAPY INITIAL EVALUATION ADULT - PERTINENT HX OF CURRENT PROBLEM, REHAB EVAL
84yoM with h/o afib/on coumadin, htn, chf, dm 2, bph, p/w s/p fall, CT Chest 9/8, R lung PNA, emphysema, CT head/neck, chronic R infarct, CXR nonsignificant, XR hips, nonsignificant.

## 2019-09-10 NOTE — PHYSICAL THERAPY INITIAL EVALUATION ADULT - MODALITIES TREATMENT COMMENTS
pt seen for wound care for upper midline back seborrheic keratosis wound, measured 2.0 cmx2.0 cm, depth of wound nonmeasurable d/t wound bed raised from skin, noted wife has been using gauze to absorb the drainage, unknown for how long, no erythema, no purulent, no malodor, cleansed with NS, cavilon to periwound, applied allevyn foam to wound

## 2019-09-10 NOTE — PHYSICAL THERAPY INITIAL EVALUATION ADULT - PLANNED THERAPY INTERVENTIONS, PT EVAL
bed mobility training/stairs: GOAL: patient will be able to negotiated 12 stairs (I) with one HR in 2 weeks/gait training/transfer training

## 2019-09-10 NOTE — PROGRESS NOTE ADULT - SUBJECTIVE AND OBJECTIVE BOX
INTERVAL HPI/OVERNIGHT EVENTS:  Pt seen and examined at bedside.     Allergies/Intolerance: No Known Allergies      MEDICATIONS  (STANDING):  ALBUTerol/ipratropium for Nebulization 3 milliLiter(s) Nebulizer every 6 hours  aspirin  chewable 81 milliGRAM(s) Oral daily  atorvastatin 40 milliGRAM(s) Oral at bedtime  azithromycin   Tablet 500 milliGRAM(s) Oral daily  buDESOnide    Inhalation Suspension 0.25 milliGRAM(s) Inhalation two times a day  cefTRIAXone   IVPB 1000 milliGRAM(s) IV Intermittent every 24 hours  dextrose 5%. 1000 milliLiter(s) (50 mL/Hr) IV Continuous <Continuous>  dextrose 50% Injectable 12.5 Gram(s) IV Push once  dextrose 50% Injectable 25 Gram(s) IV Push once  dextrose 50% Injectable 25 Gram(s) IV Push once  finasteride 5 milliGRAM(s) Oral daily  furosemide    Tablet 20 milliGRAM(s) Oral daily  insulin glargine Injectable (LANTUS) 32 Unit(s) SubCutaneous at bedtime  insulin lispro (HumaLOG) corrective regimen sliding scale   SubCutaneous three times a day before meals  losartan 25 milliGRAM(s) Oral daily  montelukast 10 milliGRAM(s) Oral daily  tamsulosin 0.4 milliGRAM(s) Oral at bedtime  tiotropium 18 MICROgram(s) Capsule 1 Capsule(s) Inhalation daily    MEDICATIONS  (PRN):  dextrose 40% Gel 15 Gram(s) Oral once PRN Blood Glucose LESS THAN 70 milliGRAM(s)/deciliter  glucagon  Injectable 1 milliGRAM(s) IntraMuscular once PRN Glucose LESS THAN 70 milligrams/deciliter        ROS: all systems reviewed and wnl      PHYSICAL EXAMINATION:  Vital Signs Last 24 Hrs  T(C): 36.4 (10 Sep 2019 04:26), Max: 36.8 (09 Sep 2019 11:55)  T(F): 97.5 (10 Sep 2019 04:26), Max: 98.3 (09 Sep 2019 11:55)  HR: 80 (10 Sep 2019 04:26) (79 - 83)  BP: 114/57 (10 Sep 2019 04:26) (113/55 - 144/72)  BP(mean): --  RR: 18 (10 Sep 2019 04:26) (18 - 18)  SpO2: 98% (10 Sep 2019 04:26) (95% - 98%)  CAPILLARY BLOOD GLUCOSE      POCT Blood Glucose.: 213 mg/dL (09 Sep 2019 21:22)  POCT Blood Glucose.: 135 mg/dL (09 Sep 2019 17:42)  POCT Blood Glucose.: 211 mg/dL (09 Sep 2019 12:38)  POCT Blood Glucose.: 108 mg/dL (09 Sep 2019 09:02)       @ 07:01  -  0910 @ 07:00  --------------------------------------------------------  IN: 0 mL / OUT: 825 mL / NET: -825 mL        GENERAL:   NECK: supple, No JVD  CHEST/LUNG: clear to auscultation bilaterally; no rales, rhonchi, or wheezing b/l  HEART: normal S1, S2  ABDOMEN: BS+, soft, ND, NT   EXTREMITIES:  pulses palpable; no clubbing, cyanosis, or edema b/l LEs  SKIN: no rashes or lesions      LABS:                        11.7   10.99 )-----------( 153      ( 09 Sep 2019 08:16 )             37.1     09-10    146<H>  |  105  |  32<H>  ----------------------------<  73  3.3<L>   |  30  |  1.39<H>    Ca    8.9      10 Sep 2019 06:52    TPro  6.8  /  Alb  3.6  /  TBili  0.6  /  DBili  x   /  AST  11  /  ALT  13  /  AlkPhos  76  09-08    PT/INR - ( 09 Sep 2019 09:09 )   PT: 47.3 sec;   INR: 3.98 ratio         PTT - ( 08 Sep 2019 09:22 )  PTT:43.0 sec  Urinalysis Basic - ( 08 Sep 2019 13:40 )    Color: Yellow / Appearance: Clear / S.016 / pH: x  Gluc: x / Ketone: Negative  / Bili: Negative / Urobili: Negative   Blood: x / Protein: Trace / Nitrite: Negative   Leuk Esterase: Negative / RBC: 1 /hpf / WBC 1 /HPF   Sq Epi: x / Non Sq Epi: 0 /hpf / Bacteria: Negative INTERVAL HPI/OVERNIGHT EVENTS:  Pt seen and examined at bedside.     Allergies/Intolerance: No Known Allergies      MEDICATIONS  (STANDING):  ALBUTerol/ipratropium for Nebulization 3 milliLiter(s) Nebulizer every 6 hours  aspirin  chewable 81 milliGRAM(s) Oral daily  atorvastatin 40 milliGRAM(s) Oral at bedtime  azithromycin   Tablet 500 milliGRAM(s) Oral daily  buDESOnide    Inhalation Suspension 0.25 milliGRAM(s) Inhalation two times a day  cefTRIAXone   IVPB 1000 milliGRAM(s) IV Intermittent every 24 hours  dextrose 5%. 1000 milliLiter(s) (50 mL/Hr) IV Continuous <Continuous>  dextrose 50% Injectable 12.5 Gram(s) IV Push once  dextrose 50% Injectable 25 Gram(s) IV Push once  dextrose 50% Injectable 25 Gram(s) IV Push once  finasteride 5 milliGRAM(s) Oral daily  furosemide    Tablet 20 milliGRAM(s) Oral daily  insulin glargine Injectable (LANTUS) 32 Unit(s) SubCutaneous at bedtime  insulin lispro (HumaLOG) corrective regimen sliding scale   SubCutaneous three times a day before meals  losartan 25 milliGRAM(s) Oral daily  montelukast 10 milliGRAM(s) Oral daily  tamsulosin 0.4 milliGRAM(s) Oral at bedtime  tiotropium 18 MICROgram(s) Capsule 1 Capsule(s) Inhalation daily    MEDICATIONS  (PRN):  dextrose 40% Gel 15 Gram(s) Oral once PRN Blood Glucose LESS THAN 70 milliGRAM(s)/deciliter  glucagon  Injectable 1 milliGRAM(s) IntraMuscular once PRN Glucose LESS THAN 70 milligrams/deciliter        ROS: all systems reviewed and wnl      PHYSICAL EXAMINATION:  Vital Signs Last 24 Hrs  T(C): 36.4 (10 Sep 2019 04:26), Max: 36.8 (09 Sep 2019 11:55)  T(F): 97.5 (10 Sep 2019 04:26), Max: 98.3 (09 Sep 2019 11:55)  HR: 80 (10 Sep 2019 04:26) (79 - 83)  BP: 114/57 (10 Sep 2019 04:26) (113/55 - 144/72)  BP(mean): --  RR: 18 (10 Sep 2019 04:26) (18 - 18)  SpO2: 98% (10 Sep 2019 04:26) (95% - 98%)  CAPILLARY BLOOD GLUCOSE      POCT Blood Glucose.: 213 mg/dL (09 Sep 2019 21:22)  POCT Blood Glucose.: 135 mg/dL (09 Sep 2019 17:42)  POCT Blood Glucose.: 211 mg/dL (09 Sep 2019 12:38)  POCT Blood Glucose.: 108 mg/dL (09 Sep 2019 09:02)       @ 07:01  -  09-10 @ 07:00  --------------------------------------------------------  IN: 0 mL / OUT: 825 mL / NET: -825 mL        GENERAL: stable in bed at rest, no CP, fevers or SOB at rest  NECK: supple, No JVD  CHEST/LUNG: clear to auscultation bilaterally; no rales, rhonchi, or wheezing b/l  HEART: normal S1, S2  ABDOMEN: BS+, soft, ND, NT   EXTREMITIES:  pulses palpable; no clubbing, cyanosis, or edema b/l LEs  SKIN: no rashes or lesions      LABS:                        11.7   10.99 )-----------( 153      ( 09 Sep 2019 08:16 )             37.1     09-10    146<H>  |  105  |  32<H>  ----------------------------<  73  3.3<L>   |  30  |  1.39<H>    Ca    8.9      10 Sep 2019 06:52    TPro  6.8  /  Alb  3.6  /  TBili  0.6  /  DBili  x   /  AST  11  /  ALT  13  /  AlkPhos  76  09-08    PT/INR - ( 09 Sep 2019 09:09 )   PT: 47.3 sec;   INR: 3.98 ratio         PTT - ( 08 Sep 2019 09:22 )  PTT:43.0 sec  Urinalysis Basic - ( 08 Sep 2019 13:40 )    Color: Yellow / Appearance: Clear / S.016 / pH: x  Gluc: x / Ketone: Negative  / Bili: Negative / Urobili: Negative   Blood: x / Protein: Trace / Nitrite: Negative   Leuk Esterase: Negative / RBC: 1 /hpf / WBC 1 /HPF   Sq Epi: x / Non Sq Epi: 0 /hpf / Bacteria: Negative

## 2019-09-10 NOTE — CHART NOTE - NSCHARTNOTEFT_GEN_A_CORE
Np note- skin irritation    called by RN for open wound on the upper back. pt seen and examined in the bed. mili size of painless, opened wound, likely old mole which was scraped off by scratch. second black mole on mid back, armando size. small multiple moles on the upper chest, neck and face. all painless. d/w Dr. Feliz, hold off derm c/s at this time. ordered wound care c/s. will f/u.     NP. Braulio Braswell  [id number]

## 2019-09-11 ENCOUNTER — TRANSCRIPTION ENCOUNTER (OUTPATIENT)
Age: 84
End: 2019-09-11

## 2019-09-11 VITALS
TEMPERATURE: 97 F | RESPIRATION RATE: 18 BRPM | DIASTOLIC BLOOD PRESSURE: 69 MMHG | HEART RATE: 83 BPM | SYSTOLIC BLOOD PRESSURE: 124 MMHG | OXYGEN SATURATION: 96 %

## 2019-09-11 LAB
ANION GAP SERPL CALC-SCNC: 9 MMOL/L — SIGNIFICANT CHANGE UP (ref 5–17)
APTT BLD: 36.9 SEC — HIGH (ref 27.5–36.3)
BUN SERPL-MCNC: 29 MG/DL — HIGH (ref 7–23)
CALCIUM SERPL-MCNC: 8.8 MG/DL — SIGNIFICANT CHANGE UP (ref 8.4–10.5)
CHLORIDE SERPL-SCNC: 105 MMOL/L — SIGNIFICANT CHANGE UP (ref 96–108)
CO2 SERPL-SCNC: 30 MMOL/L — SIGNIFICANT CHANGE UP (ref 22–31)
CREAT SERPL-MCNC: 1.39 MG/DL — HIGH (ref 0.5–1.3)
GLUCOSE BLDC GLUCOMTR-MCNC: 127 MG/DL — HIGH (ref 70–99)
GLUCOSE BLDC GLUCOMTR-MCNC: 265 MG/DL — HIGH (ref 70–99)
GLUCOSE SERPL-MCNC: 153 MG/DL — HIGH (ref 70–99)
HCT VFR BLD CALC: 34.5 % — LOW (ref 39–50)
HGB BLD-MCNC: 11.2 G/DL — LOW (ref 13–17)
INR BLD: 1.81 RATIO — HIGH (ref 0.88–1.16)
MAGNESIUM SERPL-MCNC: 2 MG/DL — SIGNIFICANT CHANGE UP (ref 1.6–2.6)
MCHC RBC-ENTMCNC: 30 PG — SIGNIFICANT CHANGE UP (ref 27–34)
MCHC RBC-ENTMCNC: 32.5 GM/DL — SIGNIFICANT CHANGE UP (ref 32–36)
MCV RBC AUTO: 92.5 FL — SIGNIFICANT CHANGE UP (ref 80–100)
PHOSPHATE SERPL-MCNC: 2.9 MG/DL — SIGNIFICANT CHANGE UP (ref 2.5–4.5)
PLATELET # BLD AUTO: 157 K/UL — SIGNIFICANT CHANGE UP (ref 150–400)
POTASSIUM SERPL-MCNC: 3.3 MMOL/L — LOW (ref 3.5–5.3)
POTASSIUM SERPL-SCNC: 3.3 MMOL/L — LOW (ref 3.5–5.3)
PROTHROM AB SERPL-ACNC: 21.2 SEC — HIGH (ref 10–13.1)
RBC # BLD: 3.73 M/UL — LOW (ref 4.2–5.8)
RBC # FLD: 13.7 % — SIGNIFICANT CHANGE UP (ref 10.3–14.5)
SODIUM SERPL-SCNC: 144 MMOL/L — SIGNIFICANT CHANGE UP (ref 135–145)
WBC # BLD: 7.31 K/UL — SIGNIFICANT CHANGE UP (ref 3.8–10.5)
WBC # FLD AUTO: 7.31 K/UL — SIGNIFICANT CHANGE UP (ref 3.8–10.5)

## 2019-09-11 RX ORDER — WARFARIN SODIUM 2.5 MG/1
1 TABLET ORAL
Qty: 0 | Refills: 0 | DISCHARGE

## 2019-09-11 RX ORDER — WARFARIN SODIUM 2.5 MG/1
1 TABLET ORAL
Qty: 30 | Refills: 0
Start: 2019-09-11 | End: 2019-10-10

## 2019-09-11 RX ORDER — LOSARTAN POTASSIUM 100 MG/1
1 TABLET, FILM COATED ORAL
Qty: 30 | Refills: 0
Start: 2019-09-11 | End: 2019-10-10

## 2019-09-11 RX ORDER — FUROSEMIDE 40 MG
1 TABLET ORAL
Qty: 0 | Refills: 0 | DISCHARGE
Start: 2019-09-11 | End: 2019-10-10

## 2019-09-11 RX ORDER — INSULIN GLARGINE 100 [IU]/ML
34 INJECTION, SOLUTION SUBCUTANEOUS
Qty: 0 | Refills: 0 | DISCHARGE

## 2019-09-11 RX ORDER — FUROSEMIDE 40 MG
1 TABLET ORAL
Qty: 30 | Refills: 0
Start: 2019-09-11 | End: 2019-10-10

## 2019-09-11 RX ORDER — POTASSIUM CHLORIDE 20 MEQ
40 PACKET (EA) ORAL ONCE
Refills: 0 | Status: COMPLETED | OUTPATIENT
Start: 2019-09-11 | End: 2019-09-11

## 2019-09-11 RX ORDER — AZITHROMYCIN 500 MG/1
1 TABLET, FILM COATED ORAL
Qty: 2 | Refills: 0
Start: 2019-09-11 | End: 2019-09-12

## 2019-09-11 RX ORDER — FUROSEMIDE 40 MG
2 TABLET ORAL
Qty: 0 | Refills: 0 | DISCHARGE

## 2019-09-11 RX ORDER — TELMISARTAN 20 MG/1
1 TABLET ORAL
Qty: 0 | Refills: 0 | DISCHARGE

## 2019-09-11 RX ORDER — CEFUROXIME AXETIL 250 MG
1 TABLET ORAL
Qty: 10 | Refills: 0
Start: 2019-09-11 | End: 2019-09-15

## 2019-09-11 RX ADMIN — INFLUENZA VIRUS VACCINE 0.5 MILLILITER(S): 15; 15; 15; 15 SUSPENSION INTRAMUSCULAR at 13:29

## 2019-09-11 RX ADMIN — Medication 3 MILLILITER(S): at 05:22

## 2019-09-11 RX ADMIN — Medication 40 MILLIEQUIVALENT(S): at 13:29

## 2019-09-11 RX ADMIN — Medication 3 MILLILITER(S): at 01:01

## 2019-09-11 RX ADMIN — Medication 20 MILLIGRAM(S): at 05:22

## 2019-09-11 RX ADMIN — LOSARTAN POTASSIUM 25 MILLIGRAM(S): 100 TABLET, FILM COATED ORAL at 05:22

## 2019-09-11 RX ADMIN — MONTELUKAST 10 MILLIGRAM(S): 4 TABLET, CHEWABLE ORAL at 11:41

## 2019-09-11 RX ADMIN — Medication 3 MILLILITER(S): at 11:41

## 2019-09-11 RX ADMIN — Medication 81 MILLIGRAM(S): at 11:41

## 2019-09-11 RX ADMIN — FINASTERIDE 5 MILLIGRAM(S): 5 TABLET, FILM COATED ORAL at 11:41

## 2019-09-11 RX ADMIN — CEFTRIAXONE 100 MILLIGRAM(S): 500 INJECTION, POWDER, FOR SOLUTION INTRAMUSCULAR; INTRAVENOUS at 09:22

## 2019-09-11 RX ADMIN — Medication 3: at 13:33

## 2019-09-11 RX ADMIN — AZITHROMYCIN 500 MILLIGRAM(S): 500 TABLET, FILM COATED ORAL at 11:41

## 2019-09-11 RX ADMIN — Medication 0.5 MILLIGRAM(S): at 05:25

## 2019-09-11 NOTE — DISCHARGE NOTE PROVIDER - HOSPITAL COURSE
Patient presenting  with generalized weakness, PMH chronic afib on coumadin,  HTN, DM(II), BPH, s/p fall from power chair. Family found him in morning and called EMS, believes he was on the ground for hours. CT Chest reveals RML and RLL CAP, s/p course of IV ABX. Will be transitioned to oral antibiotics for another 5 days. RVP panel and blood culture negative. PT eval, recommended rehab but pt refused. Pt stable for discharge home with home PT. Patient presenting  with generalized weakness. Has chronic afib on coumadin,  HTN, DM(II), BPH, s/p fall from power chair. Family found him in morning and called EMS. Believed he was on the ground for hours.         CT chest reveals RML and RLL CAP and emphysema.  Was treated for likely gram negative pneumonia. CT head showed multiple old CVA's, no acute infarct or bleed. Was started on IV course of ABX. Will be transitioned to oral antibiotics for another 5 days. RVP panel and blood culture negative. INR at discharge 1.81. Coumadin level was high on arrival. Dose decreased upon discharge. Has CKD IV but creatinine improved to 1.61 with less diuretics. Was seen by PT who recommended rehab but pt refused. Pt stable for discharge home with home PT. See attached med list. 84 year old male presenting with generalized weakness. Has chronic afib on coumadin,  HTN, DM(II), BPH, s/p fall from power chair. Family found him in morning and called EMS. Believed he was on the ground for a while.          CT chest reveals RML and RLL CAP and emphysema.  Was treated for likely gram negative pneumonia. CT head showed multiple old CVA's, no acute infarct or bleed. Was started on IV course of ABX. Will be transitioned to oral antibiotics for another 5 days. RVP panel and blood culture negative. INR at discharge 1.81. Coumadin level was high on arrival. Dose decreased upon discharge. Has CKD IV but creatinine improved to 1.61 with less diuretics. Was seen by PT who recommended rehab but pt refused. Pt stable for discharge home with home PT. See attached med list.

## 2019-09-11 NOTE — DISCHARGE NOTE NURSING/CASE MANAGEMENT/SOCIAL WORK - PATIENT PORTAL LINK FT
You can access the FollowMyHealth Patient Portal offered by [facility] by registering at the following website: [email or web address]. By joining [facility]’s FollowMyHealth portal, you will also be able to view your health information using other applications (apps) compatible with our system.

## 2019-09-11 NOTE — DISCHARGE NOTE PROVIDER - NSDCCPCAREPLAN_GEN_ALL_CORE_FT
PRINCIPAL DISCHARGE DIAGNOSIS  Diagnosis: Pneumonia  Assessment and Plan of Treatment: Pneumonia is a lung infection that can cause a fever, cough, and trouble breathing.  Continue all antibiotics as ordered until complete.  Nutrition is important, eat small frequent meals.  Get lots of rest and drink fluids.  Call your health care provider upon arrival home from hospital and make a follow up appointment for one week.  If your cough worsens, you develop fever greater than 101', you have shaking chills, a fast heartbeat, trouble breathing and/or feel your are breathing much faster than usual, call your healthcare provider.  Make sure you wash your hands frequently.        SECONDARY DISCHARGE DIAGNOSES  Diagnosis: Diabetes  Assessment and Plan of Treatment: HgA1C this admission; 9.2  Make sure you get your HgA1c checked every three months.  If you take oral diabetes medications, check your blood glucose two times a day.  If you take insulin, check your blood glucose before meals and at bedtime.  It's important not to skip any meals.  Keep a log of your blood glucose results and always take it with you to your doctor appointments.  Keep a list of your current medications including injectables and over the counter medications and bring this medication list with you to all your doctor appointments.  If you have not seen your opthalmologist this year call for appointment.  Check your feet daily for redness, sores, or openings. Do not self treat. If no improvement in two days call your primary care physician for an appointment.  Low blood sugar (hypoglycemia) is a blood sugar below 70mg/dl. Check your blood sugar if you feel signs/symptoms of hypoglycemia. If your blood sugar is below 70 take 15 grams of carbohydrates (ex 4 oz of apple juice, 3-4 glucosr tablets, or 4-6 oz of regular soda) wait 15 minutes and repeat blood sugar to make sure it comes up above 70.  If your blood sugar is above 70 and you are due for a meal, have a meal.  If you are not due for a meal have a snack.  This snack helps keeps your blood sugar at a safe range.      Diagnosis: HTN (hypertension)  Assessment and Plan of Treatment: Low salt diet  Activity as tolerated.  Take all medication as prescribed.  Follow up with your medical doctor for routine blood pressure monitoring at your next visit.  Notify your doctor if you have any of the following symptoms:   Dizziness, Lightheadedness, Blurry vision, Headache, Chest pain, Shortness of breath      Diagnosis: Afib  Assessment and Plan of Treatment: Atrial fibrillation is the most common heart rhythm problem & has the risk of stroke & heart attack  It helps if you control your blood pressure, not drink more than 1-2 alcohol drinks per day, cut down on caffeine, getting treatment for over active thyroid gland, & getting exercise  Call your doctor if you feel your heart racing or beating unusually, chest tightness or pain, lightheaded, faint, shortness of breath especially with exercise  It is important to take your heart medication as prescribed  You may be on anticoagulation which is very important to take as directed - you may need blood work to monitor drug levels

## 2019-09-11 NOTE — PROGRESS NOTE ADULT - SUBJECTIVE AND OBJECTIVE BOX
INTERVAL HPI/OVERNIGHT EVENTS:  Pt seen and examined at bedside.     Allergies/Intolerance: No Known Allergies      MEDICATIONS  (STANDING):  ALBUTerol/ipratropium for Nebulization 3 milliLiter(s) Nebulizer every 6 hours  aspirin  chewable 81 milliGRAM(s) Oral daily  atorvastatin 40 milliGRAM(s) Oral at bedtime  azithromycin   Tablet 500 milliGRAM(s) Oral daily  buDESOnide    Inhalation Suspension 0.5 milliGRAM(s) Inhalation two times a day  cefTRIAXone   IVPB 1000 milliGRAM(s) IV Intermittent every 24 hours  dextrose 5%. 1000 milliLiter(s) (50 mL/Hr) IV Continuous <Continuous>  dextrose 50% Injectable 12.5 Gram(s) IV Push once  dextrose 50% Injectable 25 Gram(s) IV Push once  dextrose 50% Injectable 25 Gram(s) IV Push once  finasteride 5 milliGRAM(s) Oral daily  furosemide    Tablet 20 milliGRAM(s) Oral daily  influenza   Vaccine 0.5 milliLiter(s) IntraMuscular once  insulin glargine Injectable (LANTUS) 25 Unit(s) SubCutaneous at bedtime  insulin lispro (HumaLOG) corrective regimen sliding scale   SubCutaneous three times a day before meals  losartan 25 milliGRAM(s) Oral daily  montelukast 10 milliGRAM(s) Oral daily  tamsulosin 0.4 milliGRAM(s) Oral at bedtime  tiotropium 18 MICROgram(s) Capsule 1 Capsule(s) Inhalation daily    MEDICATIONS  (PRN):  dextrose 40% Gel 15 Gram(s) Oral once PRN Blood Glucose LESS THAN 70 milliGRAM(s)/deciliter  glucagon  Injectable 1 milliGRAM(s) IntraMuscular once PRN Glucose LESS THAN 70 milligrams/deciliter        ROS: all systems reviewed and wnl      PHYSICAL EXAMINATION:  Vital Signs Last 24 Hrs  T(C): 36.5 (11 Sep 2019 04:48), Max: 36.5 (10 Sep 2019 16:35)  T(F): 97.7 (11 Sep 2019 04:48), Max: 97.7 (10 Sep 2019 16:35)  HR: 80 (11 Sep 2019 04:48) (69 - 80)  BP: 121/65 (11 Sep 2019 04:48) (117/65 - 143/74)  BP(mean): --  RR: 18 (11 Sep 2019 04:48) (17 - 18)  SpO2: 99% (11 Sep 2019 04:48) (94% - 100%)  CAPILLARY BLOOD GLUCOSE      POCT Blood Glucose.: 127 mg/dL (11 Sep 2019 09:01)  POCT Blood Glucose.: 211 mg/dL (10 Sep 2019 21:27)  POCT Blood Glucose.: 162 mg/dL (10 Sep 2019 17:45)  POCT Blood Glucose.: 155 mg/dL (10 Sep 2019 13:18)      09-10 @ 07:01  -  09-11 @ 07:00  --------------------------------------------------------  IN: 0 mL / OUT: 1250 mL / NET: -1250 mL        GENERAL:   NECK: supple, No JVD  CHEST/LUNG: clear to auscultation bilaterally; no rales, rhonchi, or wheezing b/l  HEART: normal S1, S2  ABDOMEN: BS+, soft, ND, NT   EXTREMITIES:  pulses palpable; no clubbing, cyanosis, or edema b/l LEs  SKIN: no rashes or lesions      LABS:                        11.2   7.31  )-----------( 157      ( 11 Sep 2019 08:39 )             34.5     09-11    144  |  105  |  29<H>  ----------------------------<  153<H>  3.3<L>   |  30  |  1.39<H>    Ca    8.8      11 Sep 2019 05:47  Phos  2.9     09-11  Mg     2.0     09-11      PT/INR - ( 10 Sep 2019 08:33 )   PT: 31.5 sec;   INR: 2.70 ratio         PTT - ( 10 Sep 2019 08:33 )  PTT:40.0 sec INTERVAL HPI/OVERNIGHT EVENTS:  Pt seen and examined at bedside.     Allergies/Intolerance: No Known Allergies      MEDICATIONS  (STANDING):  ALBUTerol/ipratropium for Nebulization 3 milliLiter(s) Nebulizer every 6 hours  aspirin  chewable 81 milliGRAM(s) Oral daily  atorvastatin 40 milliGRAM(s) Oral at bedtime  azithromycin   Tablet 500 milliGRAM(s) Oral daily  buDESOnide    Inhalation Suspension 0.5 milliGRAM(s) Inhalation two times a day  cefTRIAXone   IVPB 1000 milliGRAM(s) IV Intermittent every 24 hours  dextrose 5%. 1000 milliLiter(s) (50 mL/Hr) IV Continuous <Continuous>  dextrose 50% Injectable 12.5 Gram(s) IV Push once  dextrose 50% Injectable 25 Gram(s) IV Push once  dextrose 50% Injectable 25 Gram(s) IV Push once  finasteride 5 milliGRAM(s) Oral daily  furosemide    Tablet 20 milliGRAM(s) Oral daily  influenza   Vaccine 0.5 milliLiter(s) IntraMuscular once  insulin glargine Injectable (LANTUS) 25 Unit(s) SubCutaneous at bedtime  insulin lispro (HumaLOG) corrective regimen sliding scale   SubCutaneous three times a day before meals  losartan 25 milliGRAM(s) Oral daily  montelukast 10 milliGRAM(s) Oral daily  tamsulosin 0.4 milliGRAM(s) Oral at bedtime  tiotropium 18 MICROgram(s) Capsule 1 Capsule(s) Inhalation daily    MEDICATIONS  (PRN):  dextrose 40% Gel 15 Gram(s) Oral once PRN Blood Glucose LESS THAN 70 milliGRAM(s)/deciliter  glucagon  Injectable 1 milliGRAM(s) IntraMuscular once PRN Glucose LESS THAN 70 milligrams/deciliter        ROS: all systems reviewed and wnl      PHYSICAL EXAMINATION:  Vital Signs Last 24 Hrs  T(C): 36.5 (11 Sep 2019 04:48), Max: 36.5 (10 Sep 2019 16:35)  T(F): 97.7 (11 Sep 2019 04:48), Max: 97.7 (10 Sep 2019 16:35)  HR: 80 (11 Sep 2019 04:48) (69 - 80)  BP: 121/65 (11 Sep 2019 04:48) (117/65 - 143/74)  BP(mean): --  RR: 18 (11 Sep 2019 04:48) (17 - 18)  SpO2: 99% (11 Sep 2019 04:48) (94% - 100%)  CAPILLARY BLOOD GLUCOSE      POCT Blood Glucose.: 127 mg/dL (11 Sep 2019 09:01)  POCT Blood Glucose.: 211 mg/dL (10 Sep 2019 21:27)  POCT Blood Glucose.: 162 mg/dL (10 Sep 2019 17:45)  POCT Blood Glucose.: 155 mg/dL (10 Sep 2019 13:18)      09-10 @ 07:01  -  09-11 @ 07:00  --------------------------------------------------------  IN: 0 mL / OUT: 1250 mL / NET: -1250 mL        GENERAL: stable in chair, no new complaints  NECK: supple, No JVD  CHEST/LUNG: clear to auscultation bilaterally; no rales, rhonchi, or wheezing b/l  HEART: normal S1, S2  ABDOMEN: BS+, soft, ND, NT   EXTREMITIES:  pulses palpable; no clubbing, cyanosis, or edema b/l LEs  SKIN: no rashes or lesions      LABS:                        11.2   7.31  )-----------( 157      ( 11 Sep 2019 08:39 )             34.5     09-11    144  |  105  |  29<H>  ----------------------------<  153<H>  3.3<L>   |  30  |  1.39<H>    Ca    8.8      11 Sep 2019 05:47  Phos  2.9     09-11  Mg     2.0     09-11      PT/INR - ( 10 Sep 2019 08:33 )   PT: 31.5 sec;   INR: 2.70 ratio         PTT - ( 10 Sep 2019 08:33 )  PTT:40.0 sec

## 2019-09-11 NOTE — PROGRESS NOTE ADULT - ASSESSMENT
83 y/o  M           h/o    HTN,   HLD,   CAD,  T2DM,  CHF (EF 40%) , COPD ,.  s/p  bile  duct stone/  removal   and hx of DVT 17 years ago on Coumadin  , complete  Right  ICA  occlusion.  right renal lesion     admitted  with  weakness/  found  on floor  for hours, a s  he had  slid  off  his  chair/  no head injury    in er  with  weakness,  fevers,  ,   elevated wbc  with  bandemia. KARLOS,   s/p  coagulopathy on arrival    cxr  , normal     CAP/  pna ,  afebrile now. less   cough  rocephin/  zmax/  nebs    ct  chest, ,   Right pna      Afib,  on coumadin  daily  inr     KARLOS, resolving    wbc ha s decreased   follow  bp  curve/  meds  lowered.  now sbp has improved  chronic  systolic  and  diastolic   chf/  ef  of  40  . on  lasix    hypoaklemia     renal  u/s.  to  f/o right renal  lesion  daily  INR      < from: CT Chest No Cont (09.08.19 @ 15:45) >  IMPRESSION:   Patchy and nodular opacities within the right medial and lower lobes   likely representing pneumonia. Recommend follow-up chest CT in 6-8 weeks   to determine resolution.  Emphysema.  < end of copied text >       < from: Transthoracic Echocardiogram (01.18.17 @ 22:50) >  onclusions:  1. Moderatly  reduced  left ventricular systolic function,  however, endocardial border definition is limited, and  segmental wall-motion assessment is limited.  2. Mild diastolic dysfunction (Stage I).  3. The right ventricle is not well visualized;       g< from: CT Chest No Cont (01.18.17 @ 15:35) >  IMPRESSION:  Centrilobular emphysema with a cluster groundglass nodules in the right   lower lobe, some which appear high density may related to prior   aspiration.   1 cm left lower lobe cyst with wall thickening is similar to 2014. 3 mm   right lower lobe nodule. Recommend follow-up CT in 6 months for complete   evaluation.  < end of copied text >         < from: CT Angio Head w/Cont (08.09.15 @ 17:48) >  IMPRESSION: Demonstration of complete right ICA occlusion. Cross fill to   the right anterior middle cerebral artery circulations from the left  20% left CCA origin narrowing. 33% narrowing proxima left CCA narrowing.  Atherosclerotic calcifications of left cavernous ICA.  No evidence of acute thrombus.  < end of copied text >
85 y/o  M           h/o    HTN,   HLD,   CAD,  T2DM,  CHF (EF 40%) , COPD ,.  s/p  bile  duct stone/  removal   and hx of DVT 17 years ago on Coumadin  , complete  Right  ICA  occlusion.  right renal lesion     admitted  with  weakness/  found  on floor  for hours, a s  he had  slid  off  his  chair/  no head injury    in er  with  weakness,  fevers,  ,   elevated wbc  with  bandemia. KARLOS,  coagulopathy    cxr  , normal    CAP/  pna  with  fevers/   cough/ sob  rocephin/  zmax/  nebs    ct  chest, pna    Afib, on coumadin  daily  inr    follow  bp  curve/  meds  lowered.  bp has improved  now  chronic  systolic  and  diastolic   chf/  ef  of  40,    on  lasix       renal  u/s.  to  f/o right renal  lesion  labs in am       < from: Transthoracic Echocardiogram (01.18.17 @ 22:50) >  onclusions:  1. Moderatly  reduced  left ventricular systolic function,  however, endocardial border definition is limited, and  segmental wall-motion assessment is limited.  2. Mild diastolic dysfunction (Stage I).  3. The right ventricle is not well visualized;       g< from: CT Chest No Cont (01.18.17 @ 15:35) >  IMPRESSION:  Centrilobular emphysema with a cluster groundglass nodules in the right   lower lobe, some which appear high density may related to prior   aspiration.   1 cm left lower lobe cyst with wall thickening is similar to 2014. 3 mm   right lower lobe nodule. Recommend follow-up CT in 6 months for complete   evaluation.  < end of copied text >         < from: CT Angio Head w/Cont (08.09.15 @ 17:48) >  IMPRESSION: Demonstration of complete right ICA occlusion. Cross fill to   the right anterior middle cerebral artery circulations from the left  20% left CCA origin narrowing. 33% narrowing proxima left CCA narrowing.  Atherosclerotic calcifications of left cavernous ICA.  No evidence of acute thrombus.  < end of copied text >
Patient presentiing  with generalized weakness.  PMH chronic afib on coumadin,  HTN, DM(II), BPH. Per patient "pressed the wrong button on my power chair" and slide down to the ground, then couldn't get up.  Family found him in morning and called EMS, believes he was on the ground for hours.   Reporting generalized weakness/malaise, some shortness of breath/cough and some mild generalized abdominal pains.   Family noted him to be vomiting this morning.  No recent travel, no sick contacts.      Pulmonary: CT chest confirms RML and RLL CAP. Continue Rocephin and Zithromax, Duonebs belen 6, Pulmicort bid and Spiriva. Oxygen   sats on RA > 92 %, no need for home oxygen.   RVP panel and blood culture negative.     CV: Stable, continue Lasix 20 mg/day and Cozaar 25 mg/day. INR 2.70, hold Coumadin, restart when INR < 2.50.     Urology: Continue Flomax .4 mg/day and Proscar 5 mg/day.     Endo: Continue Lantus to 25 units at night, BS this AM < 100. A1C not at goal 9.2. Needs insulin adjustments at home.     PT eval, home with PT in few days.
Patient presentiing  with generalized weakness.  PMH chronic afib on coumadin,  HTN, DM(II), BPH. Per patient "pressed the wrong button on my power chair" and slide down to the ground, then couldn't get up.  Family found him in morning and called EMS, believes he was on the ground for hours.   Reporting generalized weakness/malaise, some shortness of breath/cough and some mild generalized abdominal pains.   Family noted him to be vomiting this morning.  No recent travel, no sick contacts.      Pulmonary: CT chest confirms RML and RLL CAP. Continue Rocephin and Zithromax, Duonebs belen 6, Pulmicort bid and Spiriva. Oxygen   sats on RA > 92 %, no need for home oxygen.   RVP panel and blood culture negative.  Will change to oral ABX and discharge to home  later today.     CV: Stable, continue Lasix 20 mg/day and Cozaar 25 mg/day. INR 2.70, restart Coumadin 3.0 mg/day. INR < 2.50 today.     Urology: Continue Flomax .4 mg/day and Proscar 5 mg/day.     Endo: Continue Lantus to 25 units at night, BS this AM < 100. A1C not at goal 9.2. Needs insulin adjustments at home.     PT edwina, home with PT today. Family decline rehab.

## 2019-10-13 PROCEDURE — 87798 DETECT AGENT NOS DNA AMP: CPT

## 2019-10-13 PROCEDURE — 87633 RESP VIRUS 12-25 TARGETS: CPT

## 2019-10-13 PROCEDURE — 80048 BASIC METABOLIC PNL TOTAL CA: CPT

## 2019-10-13 PROCEDURE — 71250 CT THORAX DX C-: CPT

## 2019-10-13 PROCEDURE — 96374 THER/PROPH/DIAG INJ IV PUSH: CPT

## 2019-10-13 PROCEDURE — 83690 ASSAY OF LIPASE: CPT

## 2019-10-13 PROCEDURE — 83605 ASSAY OF LACTIC ACID: CPT

## 2019-10-13 PROCEDURE — 83036 HEMOGLOBIN GLYCOSYLATED A1C: CPT

## 2019-10-13 PROCEDURE — 85730 THROMBOPLASTIN TIME PARTIAL: CPT

## 2019-10-13 PROCEDURE — 81001 URINALYSIS AUTO W/SCOPE: CPT

## 2019-10-13 PROCEDURE — 80053 COMPREHEN METABOLIC PANEL: CPT

## 2019-10-13 PROCEDURE — 72170 X-RAY EXAM OF PELVIS: CPT

## 2019-10-13 PROCEDURE — 82947 ASSAY GLUCOSE BLOOD QUANT: CPT

## 2019-10-13 PROCEDURE — 99285 EMERGENCY DEPT VISIT HI MDM: CPT | Mod: 25

## 2019-10-13 PROCEDURE — 72125 CT NECK SPINE W/O DYE: CPT

## 2019-10-13 PROCEDURE — 82550 ASSAY OF CK (CPK): CPT

## 2019-10-13 PROCEDURE — 82330 ASSAY OF CALCIUM: CPT

## 2019-10-13 PROCEDURE — 87086 URINE CULTURE/COLONY COUNT: CPT

## 2019-10-13 PROCEDURE — 83735 ASSAY OF MAGNESIUM: CPT

## 2019-10-13 PROCEDURE — 96375 TX/PRO/DX INJ NEW DRUG ADDON: CPT

## 2019-10-13 PROCEDURE — 82962 GLUCOSE BLOOD TEST: CPT

## 2019-10-13 PROCEDURE — 70450 CT HEAD/BRAIN W/O DYE: CPT

## 2019-10-13 PROCEDURE — 93005 ELECTROCARDIOGRAM TRACING: CPT

## 2019-10-13 PROCEDURE — 76770 US EXAM ABDO BACK WALL COMP: CPT

## 2019-10-13 PROCEDURE — 82435 ASSAY OF BLOOD CHLORIDE: CPT

## 2019-10-13 PROCEDURE — 84295 ASSAY OF SERUM SODIUM: CPT

## 2019-10-13 PROCEDURE — 85027 COMPLETE CBC AUTOMATED: CPT

## 2019-10-13 PROCEDURE — 97162 PT EVAL MOD COMPLEX 30 MIN: CPT

## 2019-10-13 PROCEDURE — 87486 CHLMYD PNEUM DNA AMP PROBE: CPT

## 2019-10-13 PROCEDURE — 85610 PROTHROMBIN TIME: CPT

## 2019-10-13 PROCEDURE — 87040 BLOOD CULTURE FOR BACTERIA: CPT

## 2019-10-13 PROCEDURE — 71045 X-RAY EXAM CHEST 1 VIEW: CPT

## 2019-10-13 PROCEDURE — 94640 AIRWAY INHALATION TREATMENT: CPT

## 2019-10-13 PROCEDURE — 84132 ASSAY OF SERUM POTASSIUM: CPT

## 2019-10-13 PROCEDURE — 82803 BLOOD GASES ANY COMBINATION: CPT

## 2019-10-13 PROCEDURE — 85014 HEMATOCRIT: CPT

## 2019-10-13 PROCEDURE — 84100 ASSAY OF PHOSPHORUS: CPT

## 2019-10-13 PROCEDURE — 90686 IIV4 VACC NO PRSV 0.5 ML IM: CPT

## 2019-10-13 PROCEDURE — 87581 M.PNEUMON DNA AMP PROBE: CPT

## 2019-10-18 NOTE — ED PROVIDER NOTE - CPE EDP SKIN NORM
[facility]    History and Physical  Hospitalist       Date of Admission:  10/18/2019    Assessment & Plan   Arden Dee is a 43 year old male with a history of depression and asthma who presented to the ER today with a 3-week history of diarrhea.  He was noted to be hyponatremic, mildly anemic, and dehydrated in the ER.  Stool testing was obtained and is pending.  He was given IV fluids and a dose of Rocephin.  Hospitalist service was contacted to bring the patient in for further evaluation and management.    Diarrhea  -This has been going on for about 3 weeks now.  -He was diagnosed with Shigella at an urgent care center on October 10, 2019 and given a course of Bactrim, of which he completed 5-1/2 of the 7 prescribed days.  -Abdomen is tender to palpation but no rebound or guarding.  -Mildly anemic, white blood cell count within normal limits.  -CT scan shows wall thickening with variable degree of surrounding inflammatory change throughout the majority of the colon involving the ascending, transverse, descending portions of the colon, but without obvious sigmoid or rectal involvement  -Stool testing from the ER is pending, await results.  -He was given a dose of Rocephin in the ER, will hold off on further antibiotics for now.  -Given his recently diagnosed anemia, 40 pound weight loss over the last 3 months, persistent diarrhea, and abnormal CT scan, I am concerned for possible underlying inflammatory bowel disease.  Will ask GI to see the patient, appreciate their assistance.    Asthma  -No acute issues.  -Can order as needed albuterol inhaler as needed.    Anxiety/depression  -Continue PTA Paxil and as needed Klonopin.    Anemia  -Hemoglobin 11.7 today.  Was 13.3 on July 30, 2019.  -Denies seeing any blood in his stool.  -May be related to underlying inflammatory bowel disease as noted above.  -Will recheck in a.m. and monitor for any signs of blood loss.    Hyponatremia  -Mild,  asymptomatic.  -Hypovolemic.  -IV fluids.  -Recheck in a.m.    Weight loss  -Weight was 208 pounds in July 2019, now down to 165 pounds.  -Albumin 1.6.  -Concern for underlying GI disease contributing as noted above.  -GI consulted.    DVT Prophylaxis: Low Risk/Ambulatory with no VTE prophylaxis indicated  Code Status: Full Code  Expected discharge: 1-2 days, recommended to prior living arrangement once stool testing completed, GI evaluation completed.    Gael Nieto MD    Primary Care Physician   Physician No Ref-Primary    Chief Complaint   Diarrhea x 3 weeks    History is obtained from the patient    History of Present Illness   Arden Dee is a 43 year old male with a history of asthma and depression/anxiety who presents to the ER today with diarrhea.  He states he began feeling ill about 3 months ago.  He had an appointment with his primary care provider for a physical and was found to have a low hemoglobin.  He states he felt off ever since then.  He began losing weight.  Appetite was not very good.  Then on the weekend of September 28/29th he developed explosive diarrhea.  This persisted for a few days and then started to get a little better however then got worse again.  He went into an urgent care and was found to have Shigella on October 10, 2019.  He was prescribed Bactrim and took this for 5-1/2 out of 7 prescribed days because he felt like he developed a different diarrhea with abdominal cramping.  This change happened on Tuesday of this week.  He also thought that this was starting to get a little better but has gotten worse again.  He continues to have intermittent abdominal cramping and diarrhea.  He denies seeing any blood in the stool or dark tarry stool.  He was nauseous once but that has not been a persistent symptom for him.  Has had some sweats at night.  Denies any chest pain or shortness of breath.  No urinary symptoms.  No recent changes in his medications.  Denies any recent  antibiotic use other than the Bactrim as noted above.  He has never had a colonoscopy before.  He denies any family history of GI issues.  Due to the persistent symptoms that are not improving, he came into the ER today for evaluation.  In the ER, he was noted to be mildly hyponatremic and mildly anemic.  Platelet count was elevated.  Albumin was low at 1.6.  ALT mildly elevated, other LFTs within normal limits.  CT scan of the abdomen and pelvis showed wall thickening with variable degree of surrounding inflammatory changes of the majority of the colon.  A stool sample was obtained testing on that is pending at the moment.  Due to his persistent diarrhea and hyponatremia, the hospitalist service was contacted to bring the patient in the hospital for further evaluation management.    Past Medical History    I have reviewed this patient's medical history and updated it with pertinent information if needed.   Past Medical History:   Diagnosis Date     Anxiety      Asthma      Depression      Past Surgical History   I have reviewed this patient's surgical history and updated it with pertinent information if needed.  Past Surgical History:   Procedure Laterality Date     ANKLE SURGERY       AS REPAIR OF HYDROCELE,TUNICA       DENTAL SURGERY      wisdom teeth extraction     Prior to Admission Medications   Prior to Admission Medications   Prescriptions Last Dose Informant Patient Reported? Taking?   ALBUTEROL IN prn Self Yes No   Sig: Inhale 1-2 puffs into the lungs every 6 hours as needed    PARoxetine (PAXIL) 10 MG tablet 10/18/2019 at Unknown time Self Yes Yes   Sig: Take 20 mg by mouth every morning   chlorpheniramine (CHLOR-TRIMETON) 4 MG tablet prn Self Yes Yes   Sig: Take 4 mg by mouth every 6 hours as needed for allergies or rhinitis   clonazePAM (KLONOPIN) 0.5 MG tablet 10/18/2019 at Unknown time Self Yes Yes   Sig: Take 0.5 mg by mouth 2 times daily as needed for anxiety   hypromellose (ARTIFICIAL TEARS) 0.5 %  SOLN ophthalmic solution prn Self Yes Yes   Sig: Place 1 drop into both eyes every hour as needed for dry eyes   lactobacillus rhamnosus, GG, (CULTURELL) capsule 10/18/2019 at Unknown time Self Yes Yes   Sig: Take 1 capsule by mouth daily      Facility-Administered Medications: None     Allergies   Allergies   Allergen Reactions     Penicillins Shortness Of Breath and Hives     All cillin's medication.      Social History   I have reviewed this patient's social history and updated it with pertinent information if needed. Arden Dee  reports that he has quit smoking. He smoked 0.00 packs per day. He has never used smokeless tobacco. He reports previous drug use.    Family History   I have reviewed this patient's family history and updated it with pertinent information if needed.   History reviewed. No pertinent family history.    Review of Systems   The 10 point Review of Systems is negative other than noted in the HPI or here.    Physical Exam   Temp: 98.1  F (36.7  C) Temp src: Oral BP: 101/66 Pulse: 98   Resp: 16 SpO2: 97 %      Vital Signs with Ranges  Temp:  [98.1  F (36.7  C)] 98.1  F (36.7  C)  Pulse:  [98] 98  Resp:  [16] 16  BP: (101)/(66) 101/66  SpO2:  [97 %] 97 %  165 lbs 0 oz    Constitutional: awake, alert, cooperative, no apparent distress, and appears stated age  Eyes: pupils equal, round and reactive to light  ENT: oral pharynx with dry mucous membranes  Respiratory: clear to auscultation bilaterally, no crackles or wheezing  Cardiovascular: regular rate and rhythm, normal S1 and S2, and no murmur noted  GI: normal bowel sounds, soft, non-distended, generalized tenderness, no rebound or guarding  Skin: warm, dry  Musculoskeletal: no lower extremity pitting edema present  Neurologic: awake, alert, oriented to name, place and time    Data   Data reviewed today:  I personally reviewed no images or EKG's today.    Recent Labs   Lab 10/18/19  1129   WBC 8.2   HGB 11.7*   MCV 85   *   *    POTASSIUM 3.5   CHLORIDE 95   CO2 29   BUN 9   CR 0.52*   ANIONGAP 6   ALAYNA 7.9*   *   ALBUMIN 1.6*   PROTTOTAL 6.4*   BILITOTAL 0.4   ALKPHOS 86   ALT 87*   AST 40     Recent Results (from the past 24 hour(s))   CT Abdomen Pelvis w Contrast    Narrative    CT ABDOMEN AND PELVIS WITH CONTRAST 10/18/2019 1:06 PM    HISTORY:  43-year-old patient with acute generalized abdominal pain  with fever.    COMPARISON: None.    TECHNIQUE: Axial and coronal CT images obtained from the lung bases  through the abdomen and pelvis after the uneventful administration of  Isovue 370 intravenous contrast given for a total of 83 mL. Radiation  dose for this scan was reduced using automated exposure control,  adjustment of the mA and/or kV according to patient size, or iterative  reconstruction technique.    FINDINGS: The visible lung bases are clear. Heart size is normal. The  liver, gallbladder, spleen, adrenal glands, and pancreas are  unremarkable. Both kidneys are normally perfused. No hydronephrosis or  nephrolithiasis. Trace free fluid in the left paracolic gutter and  pelvis. The bladder is decompressed. No acute osseous abnormality.  There is wall thickening and mild distention in the colon at the  splenic flexure as well as possible involvement of both ascending and  descending portions of the colon. Some inflammatory changes do  surround majority of the colon, though seemingly with rectal sparing.  The appendix is normal in size and appearance. No abnormally dilated  loops of small bowel.      Impression    IMPRESSION:  Wall thickening with variable degree of surrounding  inflammatory change throughout majority of the colon involving  ascending, transverse, descending portions of the colon, though  without obvious sigmoid or rectal involvement. Differential diagnosis  includes inflammatory or infectious etiology as most likely. Ischemic  or neoplastic considered less likely. Appendix is normal in size  and  appearance.    DENISE SHAW MD      normal...

## 2020-01-03 ENCOUNTER — INPATIENT (INPATIENT)
Facility: HOSPITAL | Age: 85
LOS: 5 days | Discharge: HOME | DRG: 193 | End: 2020-01-09
Attending: INTERNAL MEDICINE | Admitting: INTERNAL MEDICINE
Payer: MEDICARE

## 2020-01-03 VITALS
HEIGHT: 70 IN | DIASTOLIC BLOOD PRESSURE: 63 MMHG | TEMPERATURE: 98 F | OXYGEN SATURATION: 94 % | HEART RATE: 100 BPM | RESPIRATION RATE: 22 BRPM | WEIGHT: 209 LBS | SYSTOLIC BLOOD PRESSURE: 94 MMHG

## 2020-01-03 DIAGNOSIS — Z98.49 CATARACT EXTRACTION STATUS, UNSPECIFIED EYE: Chronic | ICD-10-CM

## 2020-01-03 DIAGNOSIS — Z98.890 OTHER SPECIFIED POSTPROCEDURAL STATES: Chronic | ICD-10-CM

## 2020-01-03 DIAGNOSIS — J18.9 PNEUMONIA, UNSPECIFIED ORGANISM: ICD-10-CM

## 2020-01-03 LAB
ALBUMIN SERPL ELPH-MCNC: 3.5 G/DL — SIGNIFICANT CHANGE UP (ref 3.3–5)
ALP SERPL-CCNC: 62 U/L — SIGNIFICANT CHANGE UP (ref 40–120)
ALT FLD-CCNC: 16 U/L — SIGNIFICANT CHANGE UP (ref 10–45)
ANION GAP SERPL CALC-SCNC: 19 MMOL/L — HIGH (ref 5–17)
APPEARANCE UR: CLEAR — SIGNIFICANT CHANGE UP
APTT BLD: 41.4 SEC — HIGH (ref 27.5–36.3)
AST SERPL-CCNC: 16 U/L — SIGNIFICANT CHANGE UP (ref 10–40)
BASOPHILS # BLD AUTO: 0.12 K/UL — SIGNIFICANT CHANGE UP (ref 0–0.2)
BASOPHILS NFR BLD AUTO: 1.6 % — SIGNIFICANT CHANGE UP (ref 0–2)
BILIRUB SERPL-MCNC: 0.4 MG/DL — SIGNIFICANT CHANGE UP (ref 0.2–1.2)
BILIRUB UR-MCNC: NEGATIVE — SIGNIFICANT CHANGE UP
BUN SERPL-MCNC: 44 MG/DL — HIGH (ref 7–23)
CALCIUM SERPL-MCNC: 9.4 MG/DL — SIGNIFICANT CHANGE UP (ref 8.4–10.5)
CHLORIDE SERPL-SCNC: 97 MMOL/L — SIGNIFICANT CHANGE UP (ref 96–108)
CO2 SERPL-SCNC: 26 MMOL/L — SIGNIFICANT CHANGE UP (ref 22–31)
COLOR SPEC: SIGNIFICANT CHANGE UP
CREAT SERPL-MCNC: 1.78 MG/DL — HIGH (ref 0.5–1.3)
DIFF PNL FLD: NEGATIVE — SIGNIFICANT CHANGE UP
EOSINOPHIL # BLD AUTO: 0.16 K/UL — SIGNIFICANT CHANGE UP (ref 0–0.5)
EOSINOPHIL NFR BLD AUTO: 2.1 % — SIGNIFICANT CHANGE UP (ref 0–6)
FLU A RESULT: SIGNIFICANT CHANGE UP
FLU A RESULT: SIGNIFICANT CHANGE UP
FLUAV AG NPH QL: SIGNIFICANT CHANGE UP
FLUBV AG NPH QL: SIGNIFICANT CHANGE UP
GLUCOSE BLDC GLUCOMTR-MCNC: 157 MG/DL — HIGH (ref 70–99)
GLUCOSE SERPL-MCNC: 170 MG/DL — HIGH (ref 70–99)
GLUCOSE UR QL: NEGATIVE — SIGNIFICANT CHANGE UP
HCT VFR BLD CALC: 39.1 % — SIGNIFICANT CHANGE UP (ref 39–50)
HGB BLD-MCNC: 13 G/DL — SIGNIFICANT CHANGE UP (ref 13–17)
IMM GRANULOCYTES NFR BLD AUTO: 0.3 % — SIGNIFICANT CHANGE UP (ref 0–1.5)
INR BLD: 2.66 RATIO — HIGH (ref 0.88–1.16)
KETONES UR-MCNC: NEGATIVE — SIGNIFICANT CHANGE UP
LACTATE BLDV-MCNC: 3.4 MMOL/L — HIGH (ref 0.7–2)
LEUKOCYTE ESTERASE UR-ACNC: NEGATIVE — SIGNIFICANT CHANGE UP
LYMPHOCYTES # BLD AUTO: 1.22 K/UL — SIGNIFICANT CHANGE UP (ref 1–3.3)
LYMPHOCYTES # BLD AUTO: 16.2 % — SIGNIFICANT CHANGE UP (ref 13–44)
MCHC RBC-ENTMCNC: 31 PG — SIGNIFICANT CHANGE UP (ref 27–34)
MCHC RBC-ENTMCNC: 33.2 GM/DL — SIGNIFICANT CHANGE UP (ref 32–36)
MCV RBC AUTO: 93.1 FL — SIGNIFICANT CHANGE UP (ref 80–100)
MONOCYTES # BLD AUTO: 0.81 K/UL — SIGNIFICANT CHANGE UP (ref 0–0.9)
MONOCYTES NFR BLD AUTO: 10.7 % — SIGNIFICANT CHANGE UP (ref 2–14)
NEUTROPHILS # BLD AUTO: 5.21 K/UL — SIGNIFICANT CHANGE UP (ref 1.8–7.4)
NEUTROPHILS NFR BLD AUTO: 69.1 % — SIGNIFICANT CHANGE UP (ref 43–77)
NITRITE UR-MCNC: NEGATIVE — SIGNIFICANT CHANGE UP
NRBC # BLD: 0 /100 WBCS — SIGNIFICANT CHANGE UP (ref 0–0)
PH UR: 6 — SIGNIFICANT CHANGE UP (ref 5–8)
PLATELET # BLD AUTO: 172 K/UL — SIGNIFICANT CHANGE UP (ref 150–400)
POTASSIUM SERPL-MCNC: 3.3 MMOL/L — LOW (ref 3.5–5.3)
POTASSIUM SERPL-SCNC: 3.3 MMOL/L — LOW (ref 3.5–5.3)
PROT SERPL-MCNC: 6.6 G/DL — SIGNIFICANT CHANGE UP (ref 6–8.3)
PROT UR-MCNC: NEGATIVE — SIGNIFICANT CHANGE UP
PROTHROM AB SERPL-ACNC: 31.2 SEC — HIGH (ref 10–12.9)
RBC # BLD: 4.2 M/UL — SIGNIFICANT CHANGE UP (ref 4.2–5.8)
RBC # FLD: 14.3 % — SIGNIFICANT CHANGE UP (ref 10.3–14.5)
RSV RESULT: DETECTED
RSV RNA RESP QL NAA+PROBE: DETECTED
SODIUM SERPL-SCNC: 142 MMOL/L — SIGNIFICANT CHANGE UP (ref 135–145)
SP GR SPEC: 1.01 — SIGNIFICANT CHANGE UP (ref 1.01–1.02)
TROPONIN T, HIGH SENSITIVITY RESULT: 77 NG/L — HIGH (ref 0–51)
UROBILINOGEN FLD QL: NEGATIVE — SIGNIFICANT CHANGE UP
WBC # BLD: 7.54 K/UL — SIGNIFICANT CHANGE UP (ref 3.8–10.5)
WBC # FLD AUTO: 7.54 K/UL — SIGNIFICANT CHANGE UP (ref 3.8–10.5)

## 2020-01-03 PROCEDURE — 99285 EMERGENCY DEPT VISIT HI MDM: CPT

## 2020-01-03 PROCEDURE — 71045 X-RAY EXAM CHEST 1 VIEW: CPT | Mod: 26

## 2020-01-03 RX ORDER — FUROSEMIDE 40 MG
20 TABLET ORAL DAILY
Refills: 0 | Status: DISCONTINUED | OUTPATIENT
Start: 2020-01-04 | End: 2020-01-09

## 2020-01-03 RX ORDER — BUDESONIDE, MICRONIZED 100 %
0.5 POWDER (GRAM) MISCELLANEOUS
Refills: 0 | Status: DISCONTINUED | OUTPATIENT
Start: 2020-01-03 | End: 2020-01-09

## 2020-01-03 RX ORDER — INSULIN LISPRO 100/ML
VIAL (ML) SUBCUTANEOUS
Refills: 0 | Status: DISCONTINUED | OUTPATIENT
Start: 2020-01-03 | End: 2020-01-09

## 2020-01-03 RX ORDER — SODIUM CHLORIDE 9 MG/ML
500 INJECTION INTRAMUSCULAR; INTRAVENOUS; SUBCUTANEOUS ONCE
Refills: 0 | Status: COMPLETED | OUTPATIENT
Start: 2020-01-03 | End: 2020-01-03

## 2020-01-03 RX ORDER — ATORVASTATIN CALCIUM 80 MG/1
20 TABLET, FILM COATED ORAL AT BEDTIME
Refills: 0 | Status: DISCONTINUED | OUTPATIENT
Start: 2020-01-03 | End: 2020-01-09

## 2020-01-03 RX ORDER — DEXTROSE 50 % IN WATER 50 %
12.5 SYRINGE (ML) INTRAVENOUS ONCE
Refills: 0 | Status: DISCONTINUED | OUTPATIENT
Start: 2020-01-03 | End: 2020-01-09

## 2020-01-03 RX ORDER — FINASTERIDE 5 MG/1
5 TABLET, FILM COATED ORAL DAILY
Refills: 0 | Status: DISCONTINUED | OUTPATIENT
Start: 2020-01-03 | End: 2020-01-09

## 2020-01-03 RX ORDER — AZITHROMYCIN 500 MG/1
500 TABLET, FILM COATED ORAL EVERY 24 HOURS
Refills: 0 | Status: DISCONTINUED | OUTPATIENT
Start: 2020-01-04 | End: 2020-01-07

## 2020-01-03 RX ORDER — GLUCAGON INJECTION, SOLUTION 0.5 MG/.1ML
1 INJECTION, SOLUTION SUBCUTANEOUS ONCE
Refills: 0 | Status: DISCONTINUED | OUTPATIENT
Start: 2020-01-03 | End: 2020-01-09

## 2020-01-03 RX ORDER — POTASSIUM CHLORIDE 20 MEQ
10 PACKET (EA) ORAL DAILY
Refills: 0 | Status: DISCONTINUED | OUTPATIENT
Start: 2020-01-03 | End: 2020-01-09

## 2020-01-03 RX ORDER — ASPIRIN/CALCIUM CARB/MAGNESIUM 324 MG
81 TABLET ORAL DAILY
Refills: 0 | Status: DISCONTINUED | OUTPATIENT
Start: 2020-01-03 | End: 2020-01-09

## 2020-01-03 RX ORDER — AZITHROMYCIN 500 MG/1
500 TABLET, FILM COATED ORAL ONCE
Refills: 0 | Status: COMPLETED | OUTPATIENT
Start: 2020-01-03 | End: 2020-01-03

## 2020-01-03 RX ORDER — DEXTROSE 50 % IN WATER 50 %
25 SYRINGE (ML) INTRAVENOUS ONCE
Refills: 0 | Status: DISCONTINUED | OUTPATIENT
Start: 2020-01-03 | End: 2020-01-09

## 2020-01-03 RX ORDER — TAMSULOSIN HYDROCHLORIDE 0.4 MG/1
0.4 CAPSULE ORAL AT BEDTIME
Refills: 0 | Status: DISCONTINUED | OUTPATIENT
Start: 2020-01-03 | End: 2020-01-09

## 2020-01-03 RX ORDER — SODIUM CHLORIDE 9 MG/ML
1000 INJECTION, SOLUTION INTRAVENOUS
Refills: 0 | Status: DISCONTINUED | OUTPATIENT
Start: 2020-01-03 | End: 2020-01-09

## 2020-01-03 RX ORDER — PANTOPRAZOLE SODIUM 20 MG/1
40 TABLET, DELAYED RELEASE ORAL
Refills: 0 | Status: DISCONTINUED | OUTPATIENT
Start: 2020-01-03 | End: 2020-01-09

## 2020-01-03 RX ORDER — IPRATROPIUM/ALBUTEROL SULFATE 18-103MCG
3 AEROSOL WITH ADAPTER (GRAM) INHALATION EVERY 6 HOURS
Refills: 0 | Status: DISCONTINUED | OUTPATIENT
Start: 2020-01-03 | End: 2020-01-09

## 2020-01-03 RX ORDER — LOSARTAN POTASSIUM 100 MG/1
25 TABLET, FILM COATED ORAL DAILY
Refills: 0 | Status: DISCONTINUED | OUTPATIENT
Start: 2020-01-03 | End: 2020-01-09

## 2020-01-03 RX ORDER — DEXTROSE 50 % IN WATER 50 %
15 SYRINGE (ML) INTRAVENOUS ONCE
Refills: 0 | Status: DISCONTINUED | OUTPATIENT
Start: 2020-01-03 | End: 2020-01-09

## 2020-01-03 RX ORDER — INSULIN GLARGINE 100 [IU]/ML
25 INJECTION, SOLUTION SUBCUTANEOUS AT BEDTIME
Refills: 0 | Status: DISCONTINUED | OUTPATIENT
Start: 2020-01-03 | End: 2020-01-09

## 2020-01-03 RX ORDER — CEFTRIAXONE 500 MG/1
1000 INJECTION, POWDER, FOR SOLUTION INTRAMUSCULAR; INTRAVENOUS EVERY 24 HOURS
Refills: 0 | Status: DISCONTINUED | OUTPATIENT
Start: 2020-01-03 | End: 2020-01-08

## 2020-01-03 RX ORDER — MONTELUKAST 4 MG/1
10 TABLET, CHEWABLE ORAL DAILY
Refills: 0 | Status: DISCONTINUED | OUTPATIENT
Start: 2020-01-03 | End: 2020-01-09

## 2020-01-03 RX ORDER — WARFARIN SODIUM 2.5 MG/1
2 TABLET ORAL ONCE
Refills: 0 | Status: COMPLETED | OUTPATIENT
Start: 2020-01-03 | End: 2020-01-03

## 2020-01-03 RX ORDER — CEFTRIAXONE 500 MG/1
1000 INJECTION, POWDER, FOR SOLUTION INTRAMUSCULAR; INTRAVENOUS ONCE
Refills: 0 | Status: COMPLETED | OUTPATIENT
Start: 2020-01-03 | End: 2020-01-03

## 2020-01-03 RX ADMIN — CEFTRIAXONE 100 MILLIGRAM(S): 500 INJECTION, POWDER, FOR SOLUTION INTRAMUSCULAR; INTRAVENOUS at 15:31

## 2020-01-03 RX ADMIN — AZITHROMYCIN 250 MILLIGRAM(S): 500 TABLET, FILM COATED ORAL at 16:00

## 2020-01-03 RX ADMIN — ATORVASTATIN CALCIUM 20 MILLIGRAM(S): 80 TABLET, FILM COATED ORAL at 23:30

## 2020-01-03 RX ADMIN — Medication 3 MILLILITER(S): at 21:40

## 2020-01-03 RX ADMIN — INSULIN GLARGINE 25 UNIT(S): 100 INJECTION, SOLUTION SUBCUTANEOUS at 23:35

## 2020-01-03 RX ADMIN — TAMSULOSIN HYDROCHLORIDE 0.4 MILLIGRAM(S): 0.4 CAPSULE ORAL at 23:30

## 2020-01-03 RX ADMIN — Medication 10 MILLIEQUIVALENT(S): at 23:30

## 2020-01-03 RX ADMIN — SODIUM CHLORIDE 500 MILLILITER(S): 9 INJECTION INTRAMUSCULAR; INTRAVENOUS; SUBCUTANEOUS at 15:31

## 2020-01-03 RX ADMIN — WARFARIN SODIUM 2 MILLIGRAM(S): 2.5 TABLET ORAL at 23:33

## 2020-01-03 NOTE — ED ADULT NURSE NOTE - OBJECTIVE STATEMENT
Pt is an ambulatory 84 yr old male a/o X 3 c/o diff breathing and sob with subjective fevers.  Pt also started on PO keflex for cellulitis of fingers and PNA.  PERRL wnl, ragland withe qual strength.  LUNGS have wheezes expiratory at bases B/L.  NSR on cm at 99 bpm.  No chest pain.  SOB at rest with dry non productive cough.  Abdomen NT ND.  No urinary symptoms.  No N/V/F.  Peripheral pulses +2bl no edema.

## 2020-01-03 NOTE — ED PROVIDER NOTE - NS ED ATTENDING STATEMENT MOD
NO ACTIVE VASCULITIS SEEN - NO UVEITIS - NO CELLS  - CONT METHOTREXATE AND PREDNISONE - IN PAST HAS RECURRED WHEN MEDS WERE REDUCED - DISCUSSED F/U WITH DR GILLIS AND PT TO CONSIDER. I have personally seen and examined this patient.  I have fully participated in the care of this patient. I have reviewed all pertinent clinical information, including history, physical exam, plan and the Resident’s note and agree except as noted.

## 2020-01-03 NOTE — H&P ADULT - ASSESSMENT
The patient is a 84y Male complaining of difficulty breathing for a few days.    PNA:  IV Abx  F/up with Bl Cx    RSV inf:  Contact precautions    A Fib:  Coumadin as per INR    Acute CHf exa:  IV Lasix  [facility]

## 2020-01-03 NOTE — ED PROVIDER NOTE - SKIN COLOR
l hand with scaly rash noted with redness, no warmth or tendernes to l hand circumferential rash index finger

## 2020-01-03 NOTE — H&P ADULT - HISTORY OF PRESENT ILLNESS
Pt is 83 y/o male with h/o afib, chf, dm, htn presents with sob, cough since yesterday thought to have a pna, also started on keflex yesterday for cellulitis to fingers of l hand index finger. Subjective chills, no fevers at home.  RVP + for RSV. CXR: PNA

## 2020-01-03 NOTE — ED PROVIDER NOTE - ATTENDING CONTRIBUTION TO CARE
pt is 85 y/o male with h/o afib, chf, dm, htn presents with sob, cough since yesterday thought to have a pna, also started on keflex yesterday for cellulitis to fingers of l hand index finger with no warmth or tendernss, likely rash dermatitis. afebrile rectal, doesn't meet SS crtieria but hypoxic. lungs cta b/l.

## 2020-01-03 NOTE — ED PROVIDER NOTE - CARE PLAN
Principal Discharge DX:	PNA (pneumonia)  Secondary Diagnosis:	RSV (respiratory syncytial virus infection)  Secondary Diagnosis:	Shortness of breath

## 2020-01-03 NOTE — ED ADULT NURSE NOTE - NSIMPLEMENTINTERV_GEN_ALL_ED
Implemented All Fall Risk Interventions:  [place] to call system. Call bell, personal items and telephone within reach. Instruct patient to call for assistance. Room bathroom lighting operational. Non-slip footwear when patient is off stretcher. Physically safe environment: no spills, clutter or unnecessary equipment. Stretcher in lowest position, wheels locked, appropriate side rails in place. Provide visual cue, wrist band, yellow gown, etc. Monitor gait and stability. Monitor for mental status changes and reorient to person, place, and time. Review medications for side effects contributing to fall risk. Reinforce activity limits and safety measures with patient and family.

## 2020-01-03 NOTE — ED PROVIDER NOTE - OBJECTIVE STATEMENT
Pt is 85 y/o male with h/o afib, chf, dm, htn presents with sob, cough since yesterday thought to have a pna, also started on keflex yesterday for cellulitis to fingers of l hand index finger. Subjective chills, no fevers at home.

## 2020-01-03 NOTE — ED ADULT NURSE REASSESSMENT NOTE - NS ED NURSE REASSESS COMMENT FT1
Heparin started with ORACIO Lara at bedside. Actual weight documented in sunrise, ordered based off of that weight. Pt educated on reason for medication and pt educated not to get up without assistance. Pt aware of signs of bleeding and aware to tell RN if experiencing any of those signs. Safety and comfort measures provided, bed locked and in lowest position, side rails up for safety.

## 2020-01-03 NOTE — ED PROVIDER NOTE - PROGRESS NOTE DETAILS
Kisha, pgy3: pt RSV (+) along with possible RLL pna on CXR, given antibx to cover for CAP, pt tba for optimization given his old age and mult comorbidities Kisha, pgy3: trop 89, likely 2/2 infection/heart strain, no susp for ACS given lack of CP, lack of peripheral edema, EKG WNL, will admit on tele for monitoring and rp trop

## 2020-01-04 LAB
ANION GAP SERPL CALC-SCNC: 10 MMOL/L — SIGNIFICANT CHANGE UP (ref 5–17)
ANION GAP SERPL CALC-SCNC: 13 MMOL/L — SIGNIFICANT CHANGE UP (ref 5–17)
BUN SERPL-MCNC: 43 MG/DL — HIGH (ref 7–23)
BUN SERPL-MCNC: 43 MG/DL — HIGH (ref 7–23)
CALCIUM SERPL-MCNC: 8.6 MG/DL — SIGNIFICANT CHANGE UP (ref 8.4–10.5)
CALCIUM SERPL-MCNC: 9.1 MG/DL — SIGNIFICANT CHANGE UP (ref 8.4–10.5)
CHLORIDE SERPL-SCNC: 100 MMOL/L — SIGNIFICANT CHANGE UP (ref 96–108)
CHLORIDE SERPL-SCNC: 98 MMOL/L — SIGNIFICANT CHANGE UP (ref 96–108)
CO2 SERPL-SCNC: 28 MMOL/L — SIGNIFICANT CHANGE UP (ref 22–31)
CO2 SERPL-SCNC: 31 MMOL/L — SIGNIFICANT CHANGE UP (ref 22–31)
CREAT SERPL-MCNC: 1.69 MG/DL — HIGH (ref 0.5–1.3)
CREAT SERPL-MCNC: 1.74 MG/DL — HIGH (ref 0.5–1.3)
CULTURE RESULTS: NO GROWTH — SIGNIFICANT CHANGE UP
GLUCOSE BLDC GLUCOMTR-MCNC: 140 MG/DL — HIGH (ref 70–99)
GLUCOSE BLDC GLUCOMTR-MCNC: 227 MG/DL — HIGH (ref 70–99)
GLUCOSE BLDC GLUCOMTR-MCNC: 237 MG/DL — HIGH (ref 70–99)
GLUCOSE BLDC GLUCOMTR-MCNC: 281 MG/DL — HIGH (ref 70–99)
GLUCOSE SERPL-MCNC: 165 MG/DL — HIGH (ref 70–99)
GLUCOSE SERPL-MCNC: 187 MG/DL — HIGH (ref 70–99)
HBA1C BLD-MCNC: 7.8 % — HIGH (ref 4–5.6)
INR BLD: 2.26 RATIO — HIGH (ref 0.88–1.16)
LACTATE SERPL-SCNC: 1.4 MMOL/L — SIGNIFICANT CHANGE UP (ref 0.7–2)
MAGNESIUM SERPL-MCNC: 1.7 MG/DL — SIGNIFICANT CHANGE UP (ref 1.6–2.6)
POTASSIUM SERPL-MCNC: 2.6 MMOL/L — CRITICAL LOW (ref 3.5–5.3)
POTASSIUM SERPL-MCNC: 3 MMOL/L — LOW (ref 3.5–5.3)
POTASSIUM SERPL-MCNC: 3.6 MMOL/L — SIGNIFICANT CHANGE UP (ref 3.5–5.3)
POTASSIUM SERPL-SCNC: 2.6 MMOL/L — CRITICAL LOW (ref 3.5–5.3)
POTASSIUM SERPL-SCNC: 3 MMOL/L — LOW (ref 3.5–5.3)
POTASSIUM SERPL-SCNC: 3.6 MMOL/L — SIGNIFICANT CHANGE UP (ref 3.5–5.3)
PROTHROM AB SERPL-ACNC: 26.6 SEC — HIGH (ref 10–12.9)
SODIUM SERPL-SCNC: 139 MMOL/L — SIGNIFICANT CHANGE UP (ref 135–145)
SODIUM SERPL-SCNC: 141 MMOL/L — SIGNIFICANT CHANGE UP (ref 135–145)
SPECIMEN SOURCE: SIGNIFICANT CHANGE UP

## 2020-01-04 PROCEDURE — 93010 ELECTROCARDIOGRAM REPORT: CPT

## 2020-01-04 RX ORDER — POTASSIUM CHLORIDE 20 MEQ
10 PACKET (EA) ORAL ONCE
Refills: 0 | Status: COMPLETED | OUTPATIENT
Start: 2020-01-04 | End: 2020-01-04

## 2020-01-04 RX ORDER — WARFARIN SODIUM 2.5 MG/1
3 TABLET ORAL ONCE
Refills: 0 | Status: COMPLETED | OUTPATIENT
Start: 2020-01-04 | End: 2020-01-04

## 2020-01-04 RX ORDER — POTASSIUM CHLORIDE 20 MEQ
40 PACKET (EA) ORAL ONCE
Refills: 0 | Status: DISCONTINUED | OUTPATIENT
Start: 2020-01-04 | End: 2020-01-04

## 2020-01-04 RX ORDER — POTASSIUM CHLORIDE 20 MEQ
40 PACKET (EA) ORAL ONCE
Refills: 0 | Status: COMPLETED | OUTPATIENT
Start: 2020-01-04 | End: 2020-01-04

## 2020-01-04 RX ORDER — MAGNESIUM SULFATE 500 MG/ML
1 VIAL (ML) INJECTION ONCE
Refills: 0 | Status: COMPLETED | OUTPATIENT
Start: 2020-01-04 | End: 2020-01-04

## 2020-01-04 RX ADMIN — ATORVASTATIN CALCIUM 20 MILLIGRAM(S): 80 TABLET, FILM COATED ORAL at 21:13

## 2020-01-04 RX ADMIN — Medication 3 MILLILITER(S): at 17:08

## 2020-01-04 RX ADMIN — Medication 100 MILLIEQUIVALENT(S): at 03:08

## 2020-01-04 RX ADMIN — FINASTERIDE 5 MILLIGRAM(S): 5 TABLET, FILM COATED ORAL at 11:55

## 2020-01-04 RX ADMIN — Medication 3 MILLILITER(S): at 01:50

## 2020-01-04 RX ADMIN — TAMSULOSIN HYDROCHLORIDE 0.4 MILLIGRAM(S): 0.4 CAPSULE ORAL at 21:13

## 2020-01-04 RX ADMIN — Medication 10 MILLIEQUIVALENT(S): at 11:55

## 2020-01-04 RX ADMIN — Medication 100 MILLIEQUIVALENT(S): at 01:40

## 2020-01-04 RX ADMIN — Medication 50 MILLIEQUIVALENT(S): at 04:28

## 2020-01-04 RX ADMIN — Medication 3 MILLILITER(S): at 22:26

## 2020-01-04 RX ADMIN — Medication 3 MILLILITER(S): at 11:55

## 2020-01-04 RX ADMIN — INSULIN GLARGINE 25 UNIT(S): 100 INJECTION, SOLUTION SUBCUTANEOUS at 22:26

## 2020-01-04 RX ADMIN — Medication 3: at 17:08

## 2020-01-04 RX ADMIN — Medication 3 MILLILITER(S): at 06:31

## 2020-01-04 RX ADMIN — Medication 2: at 13:38

## 2020-01-04 RX ADMIN — AZITHROMYCIN 250 MILLIGRAM(S): 500 TABLET, FILM COATED ORAL at 13:37

## 2020-01-04 RX ADMIN — Medication 40 MILLIEQUIVALENT(S): at 01:41

## 2020-01-04 RX ADMIN — PANTOPRAZOLE SODIUM 40 MILLIGRAM(S): 20 TABLET, DELAYED RELEASE ORAL at 06:29

## 2020-01-04 RX ADMIN — Medication 0.5 MILLIGRAM(S): at 17:08

## 2020-01-04 RX ADMIN — WARFARIN SODIUM 3 MILLIGRAM(S): 2.5 TABLET ORAL at 21:13

## 2020-01-04 RX ADMIN — CEFTRIAXONE 100 MILLIGRAM(S): 500 INJECTION, POWDER, FOR SOLUTION INTRAMUSCULAR; INTRAVENOUS at 13:37

## 2020-01-04 RX ADMIN — Medication 100 GRAM(S): at 03:07

## 2020-01-04 RX ADMIN — Medication 20 MILLIGRAM(S): at 06:29

## 2020-01-04 RX ADMIN — Medication 81 MILLIGRAM(S): at 11:55

## 2020-01-04 RX ADMIN — LOSARTAN POTASSIUM 25 MILLIGRAM(S): 100 TABLET, FILM COATED ORAL at 06:29

## 2020-01-04 RX ADMIN — Medication 0.5 MILLIGRAM(S): at 06:29

## 2020-01-04 RX ADMIN — MONTELUKAST 10 MILLIGRAM(S): 4 TABLET, CHEWABLE ORAL at 11:55

## 2020-01-04 NOTE — PROGRESS NOTE ADULT - SUBJECTIVE AND OBJECTIVE BOX
Patient is a 84y old  Male who presents with a chief complaint of The patient is a 84y Male complaining of difficulty breathing for a few days (2020 22:02)      SUBJECTIVE / OVERNIGHT EVENTS:    Events noted.  CONSTITUTIONAL: No fever,  or fatigue  RESPIRATORY: No cough, wheezing,  SOB  CARDIOVASCULAR: No chest pain, palpitations, dizziness, or leg swelling  GASTROINTESTINAL: No abdominal or epigastric pain. No nausea, vomiting.  NEUROLOGICAL: No headaches,     MEDICATIONS  (STANDING):  albuterol/ipratropium for Nebulization 3 milliLiter(s) Nebulizer every 6 hours  aspirin  chewable 81 milliGRAM(s) Oral daily  atorvastatin 20 milliGRAM(s) Oral at bedtime  azithromycin  IVPB 500 milliGRAM(s) IV Intermittent every 24 hours  buDESOnide    Inhalation Suspension 0.5 milliGRAM(s) Inhalation two times a day  cefTRIAXone   IVPB 1000 milliGRAM(s) IV Intermittent every 24 hours  dextrose 5%. 1000 milliLiter(s) (50 mL/Hr) IV Continuous <Continuous>  dextrose 50% Injectable 12.5 Gram(s) IV Push once  dextrose 50% Injectable 25 Gram(s) IV Push once  dextrose 50% Injectable 25 Gram(s) IV Push once  finasteride 5 milliGRAM(s) Oral daily  furosemide   Injectable 20 milliGRAM(s) IV Push daily  insulin glargine Injectable (LANTUS) 25 Unit(s) SubCutaneous at bedtime  insulin lispro (HumaLOG) corrective regimen sliding scale   SubCutaneous three times a day before meals  losartan 25 milliGRAM(s) Oral daily  montelukast 10 milliGRAM(s) Oral daily  pantoprazole    Tablet 40 milliGRAM(s) Oral before breakfast  potassium chloride    Tablet ER 10 milliEquivalent(s) Oral daily  tamsulosin 0.4 milliGRAM(s) Oral at bedtime    MEDICATIONS  (PRN):  dextrose 40% Gel 15 Gram(s) Oral once PRN Blood Glucose LESS THAN 70 milliGRAM(s)/deciliter  glucagon  Injectable 1 milliGRAM(s) IntraMuscular once PRN Glucose LESS THAN 70 milligrams/deciliter        CAPILLARY BLOOD GLUCOSE      POCT Blood Glucose.: 227 mg/dL (2020 22:25)  POCT Blood Glucose.: 281 mg/dL (2020 17:07)  POCT Blood Glucose.: 237 mg/dL (2020 13:32)  POCT Blood Glucose.: 140 mg/dL (2020 09:53)    I&O's Summary    2020 07:01  -  2020 07:00  --------------------------------------------------------  IN: 0 mL / OUT: 525 mL / NET: -525 mL    2020 07:01  -  2020 00:23  --------------------------------------------------------  IN: 520 mL / OUT: 0 mL / NET: 520 mL        PHYSICAL EXAM:  GENERAL: NAD  NECK: Supple, No JVD  CHEST/LUNG: Clear to auscultation bilaterally; BL rhonchi  HEART: Regular rate and rhythm; No murmurs, rubs, or gallops  ABDOMEN: Soft, Nontender, Nondistended; Bowel sounds present  EXTREMITIES:   No edema  NEUROLOGY: AAO X 3      LABS:                        13.0   7.54  )-----------( 172      ( 2020 15:33 )             39.1     01-04    141  |  100  |  43<H>  ----------------------------<  165<H>  3.6   |  31  |  1.69<H>    Ca    9.1      2020 06:43  Mg     1.7     04    TPro  6.6  /  Alb  3.5  /  TBili  0.4  /  DBili  x   /  AST  16  /  ALT  16  /  AlkPhos  62  01-03    PT/INR - ( 2020 08:25 )   PT: 26.6 sec;   INR: 2.26 ratio         PTT - ( 2020 15:33 )  PTT:41.4 sec      Urinalysis Basic - ( 2020 22:23 )    Color: Light Yellow / Appearance: Clear / S.013 / pH: x  Gluc: x / Ketone: Negative  / Bili: Negative / Urobili: Negative   Blood: x / Protein: Negative / Nitrite: Negative   Leuk Esterase: Negative / RBC: x / WBC x   Sq Epi: x / Non Sq Epi: x / Bacteria: x      CAPILLARY BLOOD GLUCOSE      POCT Blood Glucose.: 227 mg/dL (2020 22:25)  POCT Blood Glucose.: 281 mg/dL (2020 17:07)  POCT Blood Glucose.: 237 mg/dL (2020 13:32)  POCT Blood Glucose.: 140 mg/dL (2020 09:53)     @ 00:37  Culture-urine --  Culture results   No growth  method type --  Organism --  Organism Identification --  Specimen source .Urine Clean Catch (Midstream)   @ 18:03  Culture-urine --  Culture results   No growth to date.  method type --  Organism --  Organism Identification --  Specimen source .Blood Blood-Peripheral       @ 00:37  Culture-CSF --  Culture results   No growth  Gram stain --  Gram stain spinal fluid --  Method type --  Organism --  Organism identification --  Specimen source .Urine Clean Catch (Midstream)        @ 00:37  Culture blood --  Culture results   No growth  Gram stain --  Gram stain blood --  Method type --  Organism --  Organism identification --  Specimen source .Urine Clean Catch (Midstream)    @ 18:03  Culture blood --  Culture results   No growth to date.  Gram stain --  Gram stain blood --  Method type --  Organism --  Organism identification --  Specimen source .Blood Blood-Peripheral      RADIOLOGY & ADDITIONAL TESTS:    Imaging Personally Reviewed:    Consultant(s) Notes Reviewed:      Care Discussed with Consultants/Other Providers:

## 2020-01-04 NOTE — PROGRESS NOTE ADULT - ASSESSMENT
· Assessment	  The patient is a 84y Male complaining of difficulty breathing for a few days.    PNA:  IV Abx  F/up with Bl Cx    RSV inf:  Contact precautions    A Fib:  Coumadin as per INR    Acute CHf exa:  IV Lasix  BMP

## 2020-01-05 LAB
ANION GAP SERPL CALC-SCNC: 14 MMOL/L — SIGNIFICANT CHANGE UP (ref 5–17)
BUN SERPL-MCNC: 40 MG/DL — HIGH (ref 7–23)
CALCIUM SERPL-MCNC: 9 MG/DL — SIGNIFICANT CHANGE UP (ref 8.4–10.5)
CHLORIDE SERPL-SCNC: 98 MMOL/L — SIGNIFICANT CHANGE UP (ref 96–108)
CO2 SERPL-SCNC: 31 MMOL/L — SIGNIFICANT CHANGE UP (ref 22–31)
CREAT SERPL-MCNC: 1.8 MG/DL — HIGH (ref 0.5–1.3)
GLUCOSE BLDC GLUCOMTR-MCNC: 145 MG/DL — HIGH (ref 70–99)
GLUCOSE BLDC GLUCOMTR-MCNC: 233 MG/DL — HIGH (ref 70–99)
GLUCOSE BLDC GLUCOMTR-MCNC: 303 MG/DL — HIGH (ref 70–99)
GLUCOSE SERPL-MCNC: 162 MG/DL — HIGH (ref 70–99)
INR BLD: 2.1 RATIO — HIGH (ref 0.88–1.16)
MAGNESIUM SERPL-MCNC: 1.9 MG/DL — SIGNIFICANT CHANGE UP (ref 1.6–2.6)
POTASSIUM SERPL-MCNC: 3 MMOL/L — LOW (ref 3.5–5.3)
POTASSIUM SERPL-SCNC: 3 MMOL/L — LOW (ref 3.5–5.3)
PROTHROM AB SERPL-ACNC: 24.7 SEC — HIGH (ref 10–13.1)
SODIUM SERPL-SCNC: 143 MMOL/L — SIGNIFICANT CHANGE UP (ref 135–145)

## 2020-01-05 RX ORDER — POTASSIUM CHLORIDE 20 MEQ
20 PACKET (EA) ORAL
Refills: 0 | Status: COMPLETED | OUTPATIENT
Start: 2020-01-05 | End: 2020-01-05

## 2020-01-05 RX ORDER — WARFARIN SODIUM 2.5 MG/1
3 TABLET ORAL ONCE
Refills: 0 | Status: COMPLETED | OUTPATIENT
Start: 2020-01-05 | End: 2020-01-05

## 2020-01-05 RX ADMIN — AZITHROMYCIN 250 MILLIGRAM(S): 500 TABLET, FILM COATED ORAL at 11:22

## 2020-01-05 RX ADMIN — Medication 10 MILLIEQUIVALENT(S): at 11:21

## 2020-01-05 RX ADMIN — INSULIN GLARGINE 25 UNIT(S): 100 INJECTION, SOLUTION SUBCUTANEOUS at 22:31

## 2020-01-05 RX ADMIN — Medication 20 MILLIEQUIVALENT(S): at 20:49

## 2020-01-05 RX ADMIN — TAMSULOSIN HYDROCHLORIDE 0.4 MILLIGRAM(S): 0.4 CAPSULE ORAL at 22:32

## 2020-01-05 RX ADMIN — LOSARTAN POTASSIUM 25 MILLIGRAM(S): 100 TABLET, FILM COATED ORAL at 05:47

## 2020-01-05 RX ADMIN — Medication 0.5 MILLIGRAM(S): at 05:47

## 2020-01-05 RX ADMIN — ATORVASTATIN CALCIUM 20 MILLIGRAM(S): 80 TABLET, FILM COATED ORAL at 22:32

## 2020-01-05 RX ADMIN — FINASTERIDE 5 MILLIGRAM(S): 5 TABLET, FILM COATED ORAL at 11:21

## 2020-01-05 RX ADMIN — Medication 0.5 MILLIGRAM(S): at 17:21

## 2020-01-05 RX ADMIN — PANTOPRAZOLE SODIUM 40 MILLIGRAM(S): 20 TABLET, DELAYED RELEASE ORAL at 05:47

## 2020-01-05 RX ADMIN — Medication 20 MILLIEQUIVALENT(S): at 17:21

## 2020-01-05 RX ADMIN — Medication 81 MILLIGRAM(S): at 11:21

## 2020-01-05 RX ADMIN — Medication 3 MILLILITER(S): at 05:47

## 2020-01-05 RX ADMIN — Medication 4: at 17:27

## 2020-01-05 RX ADMIN — Medication 3 MILLILITER(S): at 17:21

## 2020-01-05 RX ADMIN — CEFTRIAXONE 100 MILLIGRAM(S): 500 INJECTION, POWDER, FOR SOLUTION INTRAMUSCULAR; INTRAVENOUS at 11:22

## 2020-01-05 RX ADMIN — MONTELUKAST 10 MILLIGRAM(S): 4 TABLET, CHEWABLE ORAL at 11:21

## 2020-01-05 RX ADMIN — WARFARIN SODIUM 3 MILLIGRAM(S): 2.5 TABLET ORAL at 22:32

## 2020-01-05 RX ADMIN — Medication 20 MILLIEQUIVALENT(S): at 15:39

## 2020-01-05 RX ADMIN — Medication 3 MILLILITER(S): at 11:20

## 2020-01-05 RX ADMIN — Medication 20 MILLIGRAM(S): at 05:47

## 2020-01-05 RX ADMIN — Medication 3 MILLILITER(S): at 23:23

## 2020-01-05 NOTE — PROGRESS NOTE ADULT - SUBJECTIVE AND OBJECTIVE BOX
Patient is a 84y old  Male who presents with a chief complaint of The patient is a 84y Male complaining of difficulty breathing for a few days (04 Jan 2020 15:22)      SUBJECTIVE / OVERNIGHT EVENTS:    Events noted.  CONSTITUTIONAL: No fever,  or fatigue  RESPIRATORY: No cough, wheezing,  No shortness of breath  CARDIOVASCULAR: No chest pain, palpitations, dizziness, or leg swelling  GASTROINTESTINAL: No abdominal or epigastric pain. No nausea, vomiting.  NEUROLOGICAL: No headaches,     MEDICATIONS  (STANDING):  albuterol/ipratropium for Nebulization 3 milliLiter(s) Nebulizer every 6 hours  aspirin  chewable 81 milliGRAM(s) Oral daily  atorvastatin 20 milliGRAM(s) Oral at bedtime  azithromycin  IVPB 500 milliGRAM(s) IV Intermittent every 24 hours  buDESOnide    Inhalation Suspension 0.5 milliGRAM(s) Inhalation two times a day  cefTRIAXone   IVPB 1000 milliGRAM(s) IV Intermittent every 24 hours  dextrose 5%. 1000 milliLiter(s) (50 mL/Hr) IV Continuous <Continuous>  dextrose 50% Injectable 12.5 Gram(s) IV Push once  dextrose 50% Injectable 25 Gram(s) IV Push once  dextrose 50% Injectable 25 Gram(s) IV Push once  finasteride 5 milliGRAM(s) Oral daily  furosemide   Injectable 20 milliGRAM(s) IV Push daily  insulin glargine Injectable (LANTUS) 25 Unit(s) SubCutaneous at bedtime  insulin lispro (HumaLOG) corrective regimen sliding scale   SubCutaneous three times a day before meals  losartan 25 milliGRAM(s) Oral daily  montelukast 10 milliGRAM(s) Oral daily  pantoprazole    Tablet 40 milliGRAM(s) Oral before breakfast  potassium chloride    Tablet ER 10 milliEquivalent(s) Oral daily  tamsulosin 0.4 milliGRAM(s) Oral at bedtime    MEDICATIONS  (PRN):  dextrose 40% Gel 15 Gram(s) Oral once PRN Blood Glucose LESS THAN 70 milliGRAM(s)/deciliter  glucagon  Injectable 1 milliGRAM(s) IntraMuscular once PRN Glucose LESS THAN 70 milligrams/deciliter        CAPILLARY BLOOD GLUCOSE      POCT Blood Glucose.: 233 mg/dL (05 Jan 2020 21:46)  POCT Blood Glucose.: 303 mg/dL (05 Jan 2020 17:22)  POCT Blood Glucose.: 145 mg/dL (05 Jan 2020 08:39)    I&O's Summary    04 Jan 2020 07:01  -  05 Jan 2020 07:00  --------------------------------------------------------  IN: 520 mL / OUT: 500 mL / NET: 20 mL    05 Jan 2020 07:01  -  05 Jan 2020 22:53  --------------------------------------------------------  IN: 200 mL / OUT: 200 mL / NET: 0 mL        PHYSICAL EXAM:  GENERAL: NAD  NECK: Supple, No JVD  CHEST/LUNG: Clear to auscultation bilaterally; No wheezing.  HEART: Regular rate and rhythm; No murmurs, rubs, or gallops  ABDOMEN: Soft, Nontender, Nondistended; Bowel sounds present  EXTREMITIES:   No edema  NEUROLOGY: AAO X 3      LABS:    01-05    143  |  98  |  40<H>  ----------------------------<  162<H>  3.0<L>   |  31  |  1.80<H>    Ca    9.0      05 Jan 2020 07:29  Mg     1.9     01-05      PT/INR - ( 05 Jan 2020 09:57 )   PT: 24.7 sec;   INR: 2.10 ratio                 CAPILLARY BLOOD GLUCOSE      POCT Blood Glucose.: 233 mg/dL (05 Jan 2020 21:46)  POCT Blood Glucose.: 303 mg/dL (05 Jan 2020 17:22)  POCT Blood Glucose.: 145 mg/dL (05 Jan 2020 08:39)    01-04 @ 00:37  Culture-urine --  Culture results   No growth  method type --  Organism --  Organism Identification --  Specimen source .Urine Clean Catch (Midstream)  01-03 @ 18:03  Culture-urine --  Culture results   No growth to date.  method type --  Organism --  Organism Identification --  Specimen source .Blood Blood-Peripheral           01-04 @ 00:37  Culture blood --  Culture results   No growth  Gram stain --  Gram stain blood --  Method type --  Organism --  Organism identification --  Specimen source .Urine Clean Catch (Midstream)   01-03 @ 18:03  Culture blood --  Culture results   No growth to date.  Gram stain --  Gram stain blood --  Method type --  Organism --  Organism identification --  Specimen source .Blood Blood-Peripheral      RADIOLOGY & ADDITIONAL TESTS:    Imaging Personally Reviewed:    Consultant(s) Notes Reviewed:      Care Discussed with Consultants/Other Providers:

## 2020-01-06 ENCOUNTER — RX RENEWAL (OUTPATIENT)
Age: 85
End: 2020-01-06

## 2020-01-06 LAB
ANION GAP SERPL CALC-SCNC: 14 MMOL/L — SIGNIFICANT CHANGE UP (ref 5–17)
BUN SERPL-MCNC: 44 MG/DL — HIGH (ref 7–23)
CALCIUM SERPL-MCNC: 8.5 MG/DL — SIGNIFICANT CHANGE UP (ref 8.4–10.5)
CHLORIDE SERPL-SCNC: 98 MMOL/L — SIGNIFICANT CHANGE UP (ref 96–108)
CO2 SERPL-SCNC: 28 MMOL/L — SIGNIFICANT CHANGE UP (ref 22–31)
CREAT SERPL-MCNC: 2.04 MG/DL — HIGH (ref 0.5–1.3)
GLUCOSE BLDC GLUCOMTR-MCNC: 176 MG/DL — HIGH (ref 70–99)
GLUCOSE BLDC GLUCOMTR-MCNC: 249 MG/DL — HIGH (ref 70–99)
GLUCOSE BLDC GLUCOMTR-MCNC: 251 MG/DL — HIGH (ref 70–99)
GLUCOSE BLDC GLUCOMTR-MCNC: 279 MG/DL — HIGH (ref 70–99)
GLUCOSE SERPL-MCNC: 175 MG/DL — HIGH (ref 70–99)
INR BLD: 1.88 RATIO — HIGH (ref 0.88–1.16)
POTASSIUM SERPL-MCNC: 3.6 MMOL/L — SIGNIFICANT CHANGE UP (ref 3.5–5.3)
POTASSIUM SERPL-SCNC: 3.6 MMOL/L — SIGNIFICANT CHANGE UP (ref 3.5–5.3)
PROTHROM AB SERPL-ACNC: 21.9 SEC — HIGH (ref 10–13.1)
SODIUM SERPL-SCNC: 140 MMOL/L — SIGNIFICANT CHANGE UP (ref 135–145)

## 2020-01-06 RX ORDER — WARFARIN SODIUM 2.5 MG/1
4 TABLET ORAL ONCE
Refills: 0 | Status: DISCONTINUED | OUTPATIENT
Start: 2020-01-06 | End: 2020-01-06

## 2020-01-06 RX ORDER — WARFARIN SODIUM 2.5 MG/1
5 TABLET ORAL ONCE
Refills: 0 | Status: COMPLETED | OUTPATIENT
Start: 2020-01-06 | End: 2020-01-06

## 2020-01-06 RX ORDER — INSULIN LISPRO 100/ML
2 VIAL (ML) SUBCUTANEOUS
Refills: 0 | Status: DISCONTINUED | OUTPATIENT
Start: 2020-01-06 | End: 2020-01-08

## 2020-01-06 RX ADMIN — FINASTERIDE 5 MILLIGRAM(S): 5 TABLET, FILM COATED ORAL at 12:01

## 2020-01-06 RX ADMIN — WARFARIN SODIUM 5 MILLIGRAM(S): 2.5 TABLET ORAL at 21:34

## 2020-01-06 RX ADMIN — Medication 3 MILLILITER(S): at 17:52

## 2020-01-06 RX ADMIN — Medication 3: at 12:40

## 2020-01-06 RX ADMIN — Medication 3 MILLILITER(S): at 23:27

## 2020-01-06 RX ADMIN — Medication 2 UNIT(S): at 17:51

## 2020-01-06 RX ADMIN — PANTOPRAZOLE SODIUM 40 MILLIGRAM(S): 20 TABLET, DELAYED RELEASE ORAL at 05:51

## 2020-01-06 RX ADMIN — MONTELUKAST 10 MILLIGRAM(S): 4 TABLET, CHEWABLE ORAL at 12:01

## 2020-01-06 RX ADMIN — Medication 81 MILLIGRAM(S): at 12:01

## 2020-01-06 RX ADMIN — INSULIN GLARGINE 25 UNIT(S): 100 INJECTION, SOLUTION SUBCUTANEOUS at 21:34

## 2020-01-06 RX ADMIN — Medication 3 MILLILITER(S): at 12:01

## 2020-01-06 RX ADMIN — Medication 3 MILLILITER(S): at 05:50

## 2020-01-06 RX ADMIN — CEFTRIAXONE 100 MILLIGRAM(S): 500 INJECTION, POWDER, FOR SOLUTION INTRAMUSCULAR; INTRAVENOUS at 12:01

## 2020-01-06 RX ADMIN — Medication 3: at 17:51

## 2020-01-06 RX ADMIN — ATORVASTATIN CALCIUM 20 MILLIGRAM(S): 80 TABLET, FILM COATED ORAL at 21:34

## 2020-01-06 RX ADMIN — Medication 1: at 08:55

## 2020-01-06 RX ADMIN — AZITHROMYCIN 250 MILLIGRAM(S): 500 TABLET, FILM COATED ORAL at 12:34

## 2020-01-06 RX ADMIN — Medication 20 MILLIGRAM(S): at 05:52

## 2020-01-06 RX ADMIN — Medication 0.5 MILLIGRAM(S): at 17:52

## 2020-01-06 RX ADMIN — Medication 0.5 MILLIGRAM(S): at 05:50

## 2020-01-06 RX ADMIN — Medication 10 MILLIEQUIVALENT(S): at 12:02

## 2020-01-06 RX ADMIN — TAMSULOSIN HYDROCHLORIDE 0.4 MILLIGRAM(S): 0.4 CAPSULE ORAL at 21:34

## 2020-01-06 RX ADMIN — LOSARTAN POTASSIUM 25 MILLIGRAM(S): 100 TABLET, FILM COATED ORAL at 05:51

## 2020-01-06 NOTE — PHARMACOTHERAPY INTERVENTION NOTE - COMMENTS
Confirmed home medications with patient and pharmacy, updated in Outpatient Medication Review. Discrepancies communicated with PA. Patient unsure of what Lantus dose he is taking (but fasting BG currently controlled on 25 units QHS, recommended add Humalog 2 units TID for better postprandial BG control as running in 200s and patient takes glipizide and metformin in addition to Lantus at home). Also unclear whether patient is taking losartan or telmisartan, and whether he is taking metolazone 2.5 mg three times a week (filled by pharmacy).    Desi Armas, PharmD   [phone]

## 2020-01-06 NOTE — PROGRESS NOTE ADULT - SUBJECTIVE AND OBJECTIVE BOX
Patient is a 84y old  Male who presents with a chief complaint of The patient is a 84y Male complaining of difficulty breathing for a few days (04 Jan 2020 15:22)      SUBJECTIVE / OVERNIGHT EVENTS:    Events noted.  CONSTITUTIONAL: No fever,  or fatigue  RESPIRATORY: No cough, wheezing,  No shortness of breath  CARDIOVASCULAR: No chest pain, palpitations, dizziness, or leg swelling  GASTROINTESTINAL: No abdominal or epigastric pain. No nausea, vomiting.  NEUROLOGICAL: No headaches,     MEDICATIONS  (STANDING):  albuterol/ipratropium for Nebulization 3 milliLiter(s) Nebulizer every 6 hours  aspirin  chewable 81 milliGRAM(s) Oral daily  atorvastatin 20 milliGRAM(s) Oral at bedtime  azithromycin  IVPB 500 milliGRAM(s) IV Intermittent every 24 hours  buDESOnide    Inhalation Suspension 0.5 milliGRAM(s) Inhalation two times a day  cefTRIAXone   IVPB 1000 milliGRAM(s) IV Intermittent every 24 hours  dextrose 5%. 1000 milliLiter(s) (50 mL/Hr) IV Continuous <Continuous>  dextrose 50% Injectable 12.5 Gram(s) IV Push once  dextrose 50% Injectable 25 Gram(s) IV Push once  dextrose 50% Injectable 25 Gram(s) IV Push once  finasteride 5 milliGRAM(s) Oral daily  furosemide   Injectable 20 milliGRAM(s) IV Push daily  insulin glargine Injectable (LANTUS) 25 Unit(s) SubCutaneous at bedtime  insulin lispro (HumaLOG) corrective regimen sliding scale   SubCutaneous three times a day before meals  losartan 25 milliGRAM(s) Oral daily  montelukast 10 milliGRAM(s) Oral daily  pantoprazole    Tablet 40 milliGRAM(s) Oral before breakfast  potassium chloride    Tablet ER 10 milliEquivalent(s) Oral daily  tamsulosin 0.4 milliGRAM(s) Oral at bedtime    MEDICATIONS  (PRN):  dextrose 40% Gel 15 Gram(s) Oral once PRN Blood Glucose LESS THAN 70 milliGRAM(s)/deciliter  glucagon  Injectable 1 milliGRAM(s) IntraMuscular once PRN Glucose LESS THAN 70 milligrams/deciliter        CAPILLARY BLOOD GLUCOSE      POCT Blood Glucose.: 233 mg/dL (05 Jan 2020 21:46)  POCT Blood Glucose.: 303 mg/dL (05 Jan 2020 17:22)  POCT Blood Glucose.: 145 mg/dL (05 Jan 2020 08:39)    I&O's Summary    04 Jan 2020 07:01  -  05 Jan 2020 07:00  --------------------------------------------------------  IN: 520 mL / OUT: 500 mL / NET: 20 mL    05 Jan 2020 07:01  -  05 Jan 2020 22:53  --------------------------------------------------------  IN: 200 mL / OUT: 200 mL / NET: 0 mL        PHYSICAL EXAM:    NECK: Supple, No JVD  CHEST/LUNG: Clear to auscultation bilaterally; No wheezing.  HEART: Regular rate and rhythm; No murmurs, rubs, or gallops  ABDOMEN: Soft, Nontender, Nondistended; Bowel sounds present  EXTREMITIES:   No edema  NEUROLOGY: AAO X 3      LABS:    01-05    143  |  98  |  40<H>  ----------------------------<  162<H>  3.0<L>   |  31  |  1.80<H>    Ca    9.0      05 Jan 2020 07:29  Mg     1.9     01-05      PT/INR - ( 05 Jan 2020 09:57 )   PT: 24.7 sec;   INR: 2.10 ratio                 CAPILLARY BLOOD GLUCOSE      POCT Blood Glucose.: 233 mg/dL (05 Jan 2020 21:46)  POCT Blood Glucose.: 303 mg/dL (05 Jan 2020 17:22)  POCT Blood Glucose.: 145 mg/dL (05 Jan 2020 08:39)    01-04 @ 00:37  Culture-urine --  Culture results   No growth  method type --  Organism --  Organism Identification --  Specimen source .Urine Clean Catch (Midstream)  01-03 @ 18:03  Culture-urine --  Culture results   No growth to date.  method type --  Organism --  Organism Identification --  Specimen source .Blood Blood-Peripheral           01-04 @ 00:37  Culture blood --  Culture results   No growth  Gram stain --  Gram stain blood --  Method type --  Organism --  Organism identification --  Specimen source .Urine Clean Catch (Midstream)   01-03 @ 18:03  Culture blood --  Culture results   No growth to date.  Gram stain --  Gram stain blood --  Method type --  Organism --  Organism identification --  Specimen source .Blood Blood-Peripheral      RADIOLOGY & ADDITIONAL TESTS:    Imaging Personally Reviewed:    Consultant(s) Notes Reviewed:      Care Discussed with Consultants/Other Providers:

## 2020-01-07 LAB
ANION GAP SERPL CALC-SCNC: 13 MMOL/L — SIGNIFICANT CHANGE UP (ref 5–17)
APTT BLD: 32.7 SEC — SIGNIFICANT CHANGE UP (ref 27.5–36.3)
BUN SERPL-MCNC: 45 MG/DL — HIGH (ref 7–23)
CALCIUM SERPL-MCNC: 8.6 MG/DL — SIGNIFICANT CHANGE UP (ref 8.4–10.5)
CHLORIDE SERPL-SCNC: 100 MMOL/L — SIGNIFICANT CHANGE UP (ref 96–108)
CO2 SERPL-SCNC: 28 MMOL/L — SIGNIFICANT CHANGE UP (ref 22–31)
CREAT SERPL-MCNC: 1.85 MG/DL — HIGH (ref 0.5–1.3)
GLUCOSE BLDC GLUCOMTR-MCNC: 136 MG/DL — HIGH (ref 70–99)
GLUCOSE BLDC GLUCOMTR-MCNC: 208 MG/DL — HIGH (ref 70–99)
GLUCOSE BLDC GLUCOMTR-MCNC: 219 MG/DL — HIGH (ref 70–99)
GLUCOSE BLDC GLUCOMTR-MCNC: 268 MG/DL — HIGH (ref 70–99)
GLUCOSE SERPL-MCNC: 164 MG/DL — HIGH (ref 70–99)
INR BLD: 1.67 RATIO — HIGH (ref 0.88–1.16)
MAGNESIUM SERPL-MCNC: 2.1 MG/DL — SIGNIFICANT CHANGE UP (ref 1.6–2.6)
POTASSIUM SERPL-MCNC: 3.4 MMOL/L — LOW (ref 3.5–5.3)
POTASSIUM SERPL-SCNC: 3.4 MMOL/L — LOW (ref 3.5–5.3)
PROTHROM AB SERPL-ACNC: 19.2 SEC — HIGH (ref 10–13.1)
SODIUM SERPL-SCNC: 141 MMOL/L — SIGNIFICANT CHANGE UP (ref 135–145)

## 2020-01-07 RX ORDER — WARFARIN SODIUM 2.5 MG/1
6 TABLET ORAL ONCE
Refills: 0 | Status: COMPLETED | OUTPATIENT
Start: 2020-01-07 | End: 2020-01-07

## 2020-01-07 RX ORDER — POTASSIUM CHLORIDE 20 MEQ
10 PACKET (EA) ORAL ONCE
Refills: 0 | Status: COMPLETED | OUTPATIENT
Start: 2020-01-07 | End: 2020-01-07

## 2020-01-07 RX ADMIN — MONTELUKAST 10 MILLIGRAM(S): 4 TABLET, CHEWABLE ORAL at 11:56

## 2020-01-07 RX ADMIN — Medication 0.5 MILLIGRAM(S): at 05:40

## 2020-01-07 RX ADMIN — TAMSULOSIN HYDROCHLORIDE 0.4 MILLIGRAM(S): 0.4 CAPSULE ORAL at 21:47

## 2020-01-07 RX ADMIN — Medication 10 MILLIEQUIVALENT(S): at 09:25

## 2020-01-07 RX ADMIN — Medication 20 MILLIGRAM(S): at 05:41

## 2020-01-07 RX ADMIN — Medication 3: at 12:45

## 2020-01-07 RX ADMIN — Medication 2: at 17:12

## 2020-01-07 RX ADMIN — AZITHROMYCIN 250 MILLIGRAM(S): 500 TABLET, FILM COATED ORAL at 11:57

## 2020-01-07 RX ADMIN — Medication 3 MILLILITER(S): at 23:36

## 2020-01-07 RX ADMIN — WARFARIN SODIUM 6 MILLIGRAM(S): 2.5 TABLET ORAL at 21:47

## 2020-01-07 RX ADMIN — Medication 81 MILLIGRAM(S): at 11:56

## 2020-01-07 RX ADMIN — Medication 3 MILLILITER(S): at 11:56

## 2020-01-07 RX ADMIN — INSULIN GLARGINE 25 UNIT(S): 100 INJECTION, SOLUTION SUBCUTANEOUS at 21:48

## 2020-01-07 RX ADMIN — Medication 2 UNIT(S): at 12:45

## 2020-01-07 RX ADMIN — Medication 10 MILLIEQUIVALENT(S): at 11:56

## 2020-01-07 RX ADMIN — Medication 2 UNIT(S): at 17:12

## 2020-01-07 RX ADMIN — ATORVASTATIN CALCIUM 20 MILLIGRAM(S): 80 TABLET, FILM COATED ORAL at 21:48

## 2020-01-07 RX ADMIN — Medication 3 MILLILITER(S): at 17:13

## 2020-01-07 RX ADMIN — PANTOPRAZOLE SODIUM 40 MILLIGRAM(S): 20 TABLET, DELAYED RELEASE ORAL at 05:41

## 2020-01-07 RX ADMIN — Medication 2 UNIT(S): at 08:12

## 2020-01-07 RX ADMIN — CEFTRIAXONE 100 MILLIGRAM(S): 500 INJECTION, POWDER, FOR SOLUTION INTRAMUSCULAR; INTRAVENOUS at 11:58

## 2020-01-07 RX ADMIN — LOSARTAN POTASSIUM 25 MILLIGRAM(S): 100 TABLET, FILM COATED ORAL at 05:41

## 2020-01-07 RX ADMIN — Medication 0.5 MILLIGRAM(S): at 17:13

## 2020-01-07 RX ADMIN — FINASTERIDE 5 MILLIGRAM(S): 5 TABLET, FILM COATED ORAL at 12:00

## 2020-01-07 NOTE — PHYSICAL THERAPY INITIAL EVALUATION ADULT - ADDITIONAL COMMENTS
Lives with his wife and stated he is independent with ADL's and ambulation using a walker. Lives with his wife in an apartment, +17 steps to enter. Per pt, he is independent with ADL's and ambulation using a walker prior to admission. Pt states he was unable to climb 3 steps to enter his daughter's home prompting him to come to the hospital.

## 2020-01-07 NOTE — PROGRESS NOTE ADULT - SUBJECTIVE AND OBJECTIVE BOX
Patient is a 84y old  Male who presents with a chief complaint of The patient is a 84y Male complaining of difficulty breathing for a few days (04 Jan 2020 15:22)      SUBJECTIVE / OVERNIGHT EVENTS:    Events noted.  CONSTITUTIONAL: No fever,  or fatigue  RESPIRATORY: No cough, wheezing,  No shortness of breath  CARDIOVASCULAR: No chest pain, palpitations, dizziness, or leg swelling  GASTROINTESTINAL: No abdominal or epigastric pain.   NEUROLOGICAL: No headaches,     MEDICATIONS  (STANDING):  albuterol/ipratropium for Nebulization 3 milliLiter(s) Nebulizer every 6 hours  aspirin  chewable 81 milliGRAM(s) Oral daily  atorvastatin 20 milliGRAM(s) Oral at bedtime  azithromycin  IVPB 500 milliGRAM(s) IV Intermittent every 24 hours  buDESOnide    Inhalation Suspension 0.5 milliGRAM(s) Inhalation two times a day  cefTRIAXone   IVPB 1000 milliGRAM(s) IV Intermittent every 24 hours  dextrose 5%. 1000 milliLiter(s) (50 mL/Hr) IV Continuous <Continuous>  dextrose 50% Injectable 12.5 Gram(s) IV Push once  dextrose 50% Injectable 25 Gram(s) IV Push once  dextrose 50% Injectable 25 Gram(s) IV Push once  finasteride 5 milliGRAM(s) Oral daily  furosemide   Injectable 20 milliGRAM(s) IV Push daily  insulin glargine Injectable (LANTUS) 25 Unit(s) SubCutaneous at bedtime  insulin lispro (HumaLOG) corrective regimen sliding scale   SubCutaneous three times a day before meals  losartan 25 milliGRAM(s) Oral daily  montelukast 10 milliGRAM(s) Oral daily  pantoprazole    Tablet 40 milliGRAM(s) Oral before breakfast  potassium chloride    Tablet ER 10 milliEquivalent(s) Oral daily  tamsulosin 0.4 milliGRAM(s) Oral at bedtime    MEDICATIONS  (PRN):  dextrose 40% Gel 15 Gram(s) Oral once PRN Blood Glucose LESS THAN 70 milliGRAM(s)/deciliter  glucagon  Injectable 1 milliGRAM(s) IntraMuscular once PRN Glucose LESS THAN 70 milligrams/deciliter        CAPILLARY BLOOD GLUCOSE      POCT Blood Glucose.: 233 mg/dL (05 Jan 2020 21:46)  POCT Blood Glucose.: 303 mg/dL (05 Jan 2020 17:22)  POCT Blood Glucose.: 145 mg/dL (05 Jan 2020 08:39)    I&O's Summary    04 Jan 2020 07:01  -  05 Jan 2020 07:00  --------------------------------------------------------  IN: 520 mL / OUT: 500 mL / NET: 20 mL    05 Jan 2020 07:01  -  05 Jan 2020 22:53  --------------------------------------------------------  IN: 200 mL / OUT: 200 mL / NET: 0 mL        PHYSICAL EXAM:    NECK: Supple, No JVD  CHEST/LUNG: Clear to auscultation bilaterally; No wheezing.  HEART: Regular rate and rhythm; No murmurs, rubs, or gallops  ABDOMEN: Soft, Nontender, Nondistended; Bowel sounds present  EXTREMITIES:   No edema  NEUROLOGY: AAO       LABS:    01-05    143  |  98  |  40<H>  ----------------------------<  162<H>  3.0<L>   |  31  |  1.80<H>    Ca    9.0      05 Jan 2020 07:29  Mg     1.9     01-05      PT/INR - ( 05 Jan 2020 09:57 )   PT: 24.7 sec;   INR: 2.10 ratio                 CAPILLARY BLOOD GLUCOSE      POCT Blood Glucose.: 233 mg/dL (05 Jan 2020 21:46)  POCT Blood Glucose.: 303 mg/dL (05 Jan 2020 17:22)  POCT Blood Glucose.: 145 mg/dL (05 Jan 2020 08:39)    01-04 @ 00:37  Culture-urine --  Culture results   No growth  method type --  Organism --  Organism Identification --  Specimen source .Urine Clean Catch (Midstream)  01-03 @ 18:03  Culture-urine --  Culture results   No growth to date.  method type --  Organism --  Organism Identification --  Specimen source .Blood Blood-Peripheral           01-04 @ 00:37  Culture blood --  Culture results   No growth  Gram stain --  Gram stain blood --  Method type --  Organism --  Organism identification --  Specimen source .Urine Clean Catch (Midstream)   01-03 @ 18:03  Culture blood --  Culture results   No growth to date.  Gram stain --  Gram stain blood --  Method type --  Organism --  Organism identification --  Specimen source .Blood Blood-Peripheral      RADIOLOGY & ADDITIONAL TESTS:    Imaging Personally Reviewed:    Consultant(s) Notes Reviewed:      Care Discussed with Consultants/Other Providers:

## 2020-01-07 NOTE — PROGRESS NOTE ADULT - ASSESSMENT
· Assessment	  The patient is a 84y Male complaining of difficulty breathing for a few days.    PNA:  IV Abx  Will change to po levaquin from tomorrow for 3 days    RSV inf:  Contact precautions    A Fib:  Coumadin as per INR    Acute CHf exa:  IV Lasix  BMP

## 2020-01-07 NOTE — PHYSICAL THERAPY INITIAL EVALUATION ADULT - GAIT DEVIATIONS NOTED, PT EVAL
decreased weight-shifting ability/decreased nancy/decreased step length/increased time in double stance

## 2020-01-07 NOTE — PHYSICAL THERAPY INITIAL EVALUATION ADULT - PERTINENT HX OF CURRENT PROBLEM, REHAB EVAL
83 y/o M PMH afib, chf, dm, htn p/w sob, cough since yesterday thought to have a pna, also started on keflex yesterday for cellulitis to fingers of l hand index finger. Subjective chills, no fevers at home.  RVP + for RSV. CXR: PNA.

## 2020-01-07 NOTE — PHYSICAL THERAPY INITIAL EVALUATION ADULT - PLANNED THERAPY INTERVENTIONS, PT EVAL
gait training/transfer training/stair negotiation: GOAL: Pt will be able to Negotiate up/down 17 steps, w/ CGA assist, w/ rails/and appropriate assistive device, w/reciprocal/step-to gait pattern, in 4 weeks./balance training/bed mobility training

## 2020-01-08 ENCOUNTER — TRANSCRIPTION ENCOUNTER (OUTPATIENT)
Age: 85
End: 2020-01-08

## 2020-01-08 LAB
ANION GAP SERPL CALC-SCNC: 16 MMOL/L — SIGNIFICANT CHANGE UP (ref 5–17)
APTT BLD: 32.5 SEC — SIGNIFICANT CHANGE UP (ref 27.5–36.3)
BUN SERPL-MCNC: 41 MG/DL — HIGH (ref 7–23)
CALCIUM SERPL-MCNC: 8.5 MG/DL — SIGNIFICANT CHANGE UP (ref 8.4–10.5)
CHLORIDE SERPL-SCNC: 101 MMOL/L — SIGNIFICANT CHANGE UP (ref 96–108)
CO2 SERPL-SCNC: 27 MMOL/L — SIGNIFICANT CHANGE UP (ref 22–31)
CREAT SERPL-MCNC: 1.5 MG/DL — HIGH (ref 0.5–1.3)
CULTURE RESULTS: SIGNIFICANT CHANGE UP
CULTURE RESULTS: SIGNIFICANT CHANGE UP
GLUCOSE BLDC GLUCOMTR-MCNC: 141 MG/DL — HIGH (ref 70–99)
GLUCOSE BLDC GLUCOMTR-MCNC: 195 MG/DL — HIGH (ref 70–99)
GLUCOSE BLDC GLUCOMTR-MCNC: 231 MG/DL — HIGH (ref 70–99)
GLUCOSE BLDC GLUCOMTR-MCNC: 276 MG/DL — HIGH (ref 70–99)
GLUCOSE SERPL-MCNC: 162 MG/DL — HIGH (ref 70–99)
INR BLD: 1.54 RATIO — HIGH (ref 0.88–1.16)
MAGNESIUM SERPL-MCNC: 2.2 MG/DL — SIGNIFICANT CHANGE UP (ref 1.6–2.6)
POTASSIUM SERPL-MCNC: 3.6 MMOL/L — SIGNIFICANT CHANGE UP (ref 3.5–5.3)
POTASSIUM SERPL-SCNC: 3.6 MMOL/L — SIGNIFICANT CHANGE UP (ref 3.5–5.3)
PROTHROM AB SERPL-ACNC: 18 SEC — HIGH (ref 10–13.1)
SODIUM SERPL-SCNC: 144 MMOL/L — SIGNIFICANT CHANGE UP (ref 135–145)
SPECIMEN SOURCE: SIGNIFICANT CHANGE UP
SPECIMEN SOURCE: SIGNIFICANT CHANGE UP

## 2020-01-08 RX ORDER — LOSARTAN POTASSIUM 100 MG/1
1 TABLET, FILM COATED ORAL
Qty: 0 | Refills: 0 | DISCHARGE
Start: 2020-01-08

## 2020-01-08 RX ORDER — WARFARIN SODIUM 2.5 MG/1
6 TABLET ORAL ONCE
Refills: 0 | Status: COMPLETED | OUTPATIENT
Start: 2020-01-08 | End: 2020-01-08

## 2020-01-08 RX ORDER — POTASSIUM CHLORIDE 20 MEQ
20 PACKET (EA) ORAL ONCE
Refills: 0 | Status: COMPLETED | OUTPATIENT
Start: 2020-01-08 | End: 2020-01-08

## 2020-01-08 RX ORDER — INSULIN LISPRO 100/ML
4 VIAL (ML) SUBCUTANEOUS
Refills: 0 | Status: DISCONTINUED | OUTPATIENT
Start: 2020-01-08 | End: 2020-01-09

## 2020-01-08 RX ORDER — FINASTERIDE 5 MG/1
1 TABLET, FILM COATED ORAL
Qty: 0 | Refills: 0 | DISCHARGE
Start: 2020-01-08

## 2020-01-08 RX ADMIN — ATORVASTATIN CALCIUM 20 MILLIGRAM(S): 80 TABLET, FILM COATED ORAL at 21:54

## 2020-01-08 RX ADMIN — Medication 20 MILLIGRAM(S): at 05:22

## 2020-01-08 RX ADMIN — FINASTERIDE 5 MILLIGRAM(S): 5 TABLET, FILM COATED ORAL at 11:23

## 2020-01-08 RX ADMIN — Medication 3 MILLILITER(S): at 05:22

## 2020-01-08 RX ADMIN — TAMSULOSIN HYDROCHLORIDE 0.4 MILLIGRAM(S): 0.4 CAPSULE ORAL at 21:54

## 2020-01-08 RX ADMIN — Medication 3 MILLILITER(S): at 17:42

## 2020-01-08 RX ADMIN — Medication 81 MILLIGRAM(S): at 11:23

## 2020-01-08 RX ADMIN — Medication 20 MILLIEQUIVALENT(S): at 10:32

## 2020-01-08 RX ADMIN — WARFARIN SODIUM 6 MILLIGRAM(S): 2.5 TABLET ORAL at 21:54

## 2020-01-08 RX ADMIN — Medication 2 UNIT(S): at 08:34

## 2020-01-08 RX ADMIN — Medication 2 UNIT(S): at 12:29

## 2020-01-08 RX ADMIN — INSULIN GLARGINE 25 UNIT(S): 100 INJECTION, SOLUTION SUBCUTANEOUS at 21:54

## 2020-01-08 RX ADMIN — Medication 0.5 MILLIGRAM(S): at 17:42

## 2020-01-08 RX ADMIN — Medication 4 UNIT(S): at 17:41

## 2020-01-08 RX ADMIN — Medication 2: at 12:30

## 2020-01-08 RX ADMIN — PANTOPRAZOLE SODIUM 40 MILLIGRAM(S): 20 TABLET, DELAYED RELEASE ORAL at 05:21

## 2020-01-08 RX ADMIN — Medication 0.5 MILLIGRAM(S): at 05:22

## 2020-01-08 RX ADMIN — Medication 10 MILLIEQUIVALENT(S): at 11:23

## 2020-01-08 RX ADMIN — Medication 3 MILLILITER(S): at 11:23

## 2020-01-08 RX ADMIN — LOSARTAN POTASSIUM 25 MILLIGRAM(S): 100 TABLET, FILM COATED ORAL at 05:21

## 2020-01-08 RX ADMIN — Medication 1: at 17:41

## 2020-01-08 RX ADMIN — MONTELUKAST 10 MILLIGRAM(S): 4 TABLET, CHEWABLE ORAL at 11:23

## 2020-01-08 NOTE — DISCHARGE NOTE PROVIDER - NSDCCPCAREPLAN_GEN_ALL_CORE_FT
PRINCIPAL DISCHARGE DIAGNOSIS  Diagnosis: PNA (pneumonia)  Assessment and Plan of Treatment: Pneumonia is a lung infection that can cause a fever, cough, and trouble breathing.  Continue all antibiotics as ordered until complete.  Nutrition is important, eat small frequent meals.  Get lots of rest and drink fluids.  Call your health care provider upon arrival home from hospital and make a follow up appointment for one week.  If your cough worsens, you develop fever greater than 101', you have shaking chills, a fast heartbeat, trouble breathing and/or feel your are breathing much faster than usual, call your healthcare provider.  Make sure you wash your hands frequently.        SECONDARY DISCHARGE DIAGNOSES  Diagnosis: Shortness of breath  Assessment and Plan of Treatment: Likely secondary to RSV viral infection with penumonia. Please complete antibiotics as directed and follow up with your PCP in a week    Diagnosis: RSV (respiratory syncytial virus infection)  Assessment and Plan of Treatment: Drinking electrolyte-replacing fluids,not sugary sodas or sports drinks,regularly will prevent dehydration (abnormal loss of body fluids). Acetaminophen (Tylenol, for example) will help to reduce fever and relieve headache.Good handwashing can prevent spread the virus.

## 2020-01-08 NOTE — DISCHARGE NOTE PROVIDER - HOSPITAL COURSE
Pt is 83 y/o male with h/o Afib, CHF, DM, HTN presents with SOB. Positive RSV. CXR shows ?patchy opacity at the R lung base. Completed IV ABX for PNA. Patient wean off O2 and sat well on RA. PT recs VAUGHN, but patient adamantly requesting to DC home. Patient remains stable for DC with close follow up with PCP as per Dr. Pulliam

## 2020-01-08 NOTE — DISCHARGE NOTE PROVIDER - INSTRUCTIONS
Please continue a healthy and balanced diet that is low in sodium (salt) and cholesterol. Please keep your intake of carbohydrates (breads, pasta, rice) consistent. We recommend healthy or generous servings of fruits/vegetables (high-fiber containing foods)

## 2020-01-08 NOTE — DISCHARGE NOTE NURSING/CASE MANAGEMENT/SOCIAL WORK - PATIENT PORTAL LINK FT
You can access the FollowMyHealth Patient Portal offered by [facility] by registering at the following website: [email or web address]. By joining [facility]’s FollowMyHealth portal, you will also be able to view your health information using other applications (apps) compatible with our system.

## 2020-01-08 NOTE — DISCHARGE NOTE NURSING/CASE MANAGEMENT/SOCIAL WORK - NSSCTYPOFSERV_GEN_ALL_CORE
Visiting nurse, PT, RN to assess for HHA. They will call you to set up 1st appointment, requested for 1/9. Visiting nurse, PT, RN to assess for HHA. They will call you to set up 1st appointment, requested for 1/10.

## 2020-01-08 NOTE — DISCHARGE NOTE PROVIDER - NSDCHHHOMEBOUND_GEN_ALL_CORE
Shortness of breath with minimal ambulation/Ataxic gait/Chest pain/weakness during/after ambulation   greater than 20 feet/Pain greater than 7 on scale of 10 on ambulation/Fall risk

## 2020-01-08 NOTE — PROGRESS NOTE ADULT - ASSESSMENT
· Assessment	  The patient is a 84y Male complaining of difficulty breathing for a few days.    PNA:  PO Levaquin    RSV inf:  Contact precautions    A Fib:  Coumadin as per INR    Acute CHf exa:  IV Lasix  BMP    Uncontrolled DM II:  FSSS  Lantus

## 2020-01-08 NOTE — DISCHARGE NOTE PROVIDER - NSDCMRMEDTOKEN_GEN_ALL_CORE_FT
albuterol-ipratropium 2.5 mg-0.5 mg/3 mL inhalation solution: 3 milliliter(s) inhaled 2 times a day  aspirin 81 mg oral tablet, chewable: 1 tab(s) orally once a day  budesonide 0.5 mg/2 mL inhalation suspension: 0.5 milligram(s) inhaled 2 times a day  Centrum Silver oral tablet: 1 tab(s) orally once a day  cephalexin 500 mg oral tablet: 1 tab(s) orally 4 times a day for 14 days (prescribed 1/2/20)  Coumadin 5 mg oral tablet: 1 tab(s) orally once a day  Crestor 10 mg oral tablet: 1 tab(s) orally once a day (at bedtime)  finasteride 5 mg oral tablet: 1 tab(s) orally once a day  furosemide 20 mg oral tablet: 1 tab(s) orally 2 times a day     Note: patient unsure if taking once or twice a day  glipiZIDE 10 mg oral tablet: 1 tab(s) orally 2 times a day  insulin glargine: 25 unit(s) subcutaneous once a day (at bedtime)  losartan 25 mg oral tablet: 1 tab(s) orally once a day    Note: Unclear if patient is currently taking losartan 25 mg daily (filled Sep 2019) or telmisartan 80 mg daily (filled Nov 2019 but patient does not recall)  metformin 500 mg oral tablet: 1 tab(s) orally once a day  metOLazone 2.5 mg oral tablet: 1 tab(s) orally 3 times a week  montelukast 10 mg oral tablet: 1 tab(s) orally once a day  omeprazole 40 mg oral delayed release capsule: 1 cap(s) orally once a day  potassium chloride 10 mEq oral tablet, extended release: 1 tab(s) orally once a day  tamsulosin 0.4 mg oral capsule: 1 cap(s) orally 2 times a day albuterol-ipratropium 2.5 mg-0.5 mg/3 mL inhalation solution: 3 milliliter(s) inhaled 2 times a day  aspirin 81 mg oral tablet, chewable: 1 tab(s) orally once a day  budesonide 0.5 mg/2 mL inhalation suspension: 0.5 milligram(s) inhaled 2 times a day  Centrum Silver oral tablet: 1 tab(s) orally once a day  Coumadin 5 mg oral tablet: 1 tab(s) orally once a day  Crestor 10 mg oral tablet: 1 tab(s) orally once a day (at bedtime)  finasteride 5 mg oral tablet: 1 tab(s) orally once a day  furosemide 20 mg oral tablet: 1 tab(s) orally 2 times a day     Note: patient unsure if taking once or twice a day  glipiZIDE 10 mg oral tablet: 1 tab(s) orally 2 times a day  insulin glargine: 25 unit(s) subcutaneous once a day (at bedtime)  levoFLOXacin 250 mg oral tablet: 1 tab(s) orally every 24 hours  losartan 25 mg oral tablet: 1 tab(s) orally once a day    Note: Unclear if patient is currently taking losartan 25 mg daily (filled Sep 2019) or telmisartan 80 mg daily (filled Nov 2019 but patient does not recall)  metformin 500 mg oral tablet: 1 tab(s) orally once a day  metOLazone 2.5 mg oral tablet: 1 tab(s) orally 3 times a week  montelukast 10 mg oral tablet: 1 tab(s) orally once a day  omeprazole 40 mg oral delayed release capsule: 1 cap(s) orally once a day  potassium chloride 10 mEq oral tablet, extended release: 1 tab(s) orally once a day  tamsulosin 0.4 mg oral capsule: 1 cap(s) orally 2 times a day

## 2020-01-08 NOTE — DISCHARGE NOTE PROVIDER - CARE PROVIDER_API CALL
Francesco Savage)  Family Medicine  [address] [place] [address]  Phone: [phone]  Fax: [phone]  Follow Up Time: 2 weeks

## 2020-01-08 NOTE — PHARMACOTHERAPY INTERVENTION NOTE - COMMENTS
Patient with DM currently on Lantus 25 units QHS and Humalog 2 units TID AC. Fasting BG at goal, however postprandial BG consistently 200s. Recommended increase Humalog to 4 units TID AC.    Desi Armas, PharmD   [phone]

## 2020-01-09 VITALS
RESPIRATION RATE: 20 BRPM | HEART RATE: 86 BPM | DIASTOLIC BLOOD PRESSURE: 86 MMHG | OXYGEN SATURATION: 96 % | SYSTOLIC BLOOD PRESSURE: 150 MMHG

## 2020-01-09 LAB
ANION GAP SERPL CALC-SCNC: 9 MMOL/L — SIGNIFICANT CHANGE UP (ref 5–17)
APTT BLD: 34.2 SEC — SIGNIFICANT CHANGE UP (ref 27.5–36.3)
BUN SERPL-MCNC: 38 MG/DL — HIGH (ref 7–23)
CALCIUM SERPL-MCNC: 9 MG/DL — SIGNIFICANT CHANGE UP (ref 8.4–10.5)
CHLORIDE SERPL-SCNC: 105 MMOL/L — SIGNIFICANT CHANGE UP (ref 96–108)
CO2 SERPL-SCNC: 31 MMOL/L — SIGNIFICANT CHANGE UP (ref 22–31)
CREAT SERPL-MCNC: 1.43 MG/DL — HIGH (ref 0.5–1.3)
GLUCOSE BLDC GLUCOMTR-MCNC: 166 MG/DL — HIGH (ref 70–99)
GLUCOSE BLDC GLUCOMTR-MCNC: 169 MG/DL — HIGH (ref 70–99)
GLUCOSE SERPL-MCNC: 177 MG/DL — HIGH (ref 70–99)
INR BLD: 1.8 RATIO — HIGH (ref 0.88–1.16)
POTASSIUM SERPL-MCNC: 3.9 MMOL/L — SIGNIFICANT CHANGE UP (ref 3.5–5.3)
POTASSIUM SERPL-SCNC: 3.9 MMOL/L — SIGNIFICANT CHANGE UP (ref 3.5–5.3)
PROTHROM AB SERPL-ACNC: 20.7 SEC — HIGH (ref 10–13.1)
SODIUM SERPL-SCNC: 145 MMOL/L — SIGNIFICANT CHANGE UP (ref 135–145)

## 2020-01-09 PROCEDURE — 80053 COMPREHEN METABOLIC PANEL: CPT

## 2020-01-09 PROCEDURE — 94640 AIRWAY INHALATION TREATMENT: CPT

## 2020-01-09 PROCEDURE — 85610 PROTHROMBIN TIME: CPT

## 2020-01-09 PROCEDURE — 87086 URINE CULTURE/COLONY COUNT: CPT

## 2020-01-09 PROCEDURE — 93005 ELECTROCARDIOGRAM TRACING: CPT

## 2020-01-09 PROCEDURE — 83605 ASSAY OF LACTIC ACID: CPT

## 2020-01-09 PROCEDURE — 96374 THER/PROPH/DIAG INJ IV PUSH: CPT

## 2020-01-09 PROCEDURE — 85730 THROMBOPLASTIN TIME PARTIAL: CPT

## 2020-01-09 PROCEDURE — 83036 HEMOGLOBIN GLYCOSYLATED A1C: CPT

## 2020-01-09 PROCEDURE — 71045 X-RAY EXAM CHEST 1 VIEW: CPT

## 2020-01-09 PROCEDURE — 84132 ASSAY OF SERUM POTASSIUM: CPT

## 2020-01-09 PROCEDURE — 83880 ASSAY OF NATRIURETIC PEPTIDE: CPT

## 2020-01-09 PROCEDURE — 99238 HOSP IP/OBS DSCHRG MGMT 30/<: CPT

## 2020-01-09 PROCEDURE — 96375 TX/PRO/DX INJ NEW DRUG ADDON: CPT

## 2020-01-09 PROCEDURE — 99285 EMERGENCY DEPT VISIT HI MDM: CPT | Mod: 25

## 2020-01-09 PROCEDURE — 87040 BLOOD CULTURE FOR BACTERIA: CPT

## 2020-01-09 PROCEDURE — 84484 ASSAY OF TROPONIN QUANT: CPT

## 2020-01-09 PROCEDURE — 82962 GLUCOSE BLOOD TEST: CPT

## 2020-01-09 PROCEDURE — 81003 URINALYSIS AUTO W/O SCOPE: CPT

## 2020-01-09 PROCEDURE — 97162 PT EVAL MOD COMPLEX 30 MIN: CPT

## 2020-01-09 PROCEDURE — 87631 RESP VIRUS 3-5 TARGETS: CPT

## 2020-01-09 PROCEDURE — 85027 COMPLETE CBC AUTOMATED: CPT

## 2020-01-09 PROCEDURE — 83735 ASSAY OF MAGNESIUM: CPT

## 2020-01-09 PROCEDURE — 80048 BASIC METABOLIC PNL TOTAL CA: CPT

## 2020-01-09 RX ORDER — CEPHALEXIN 500 MG
1 CAPSULE ORAL
Qty: 0 | Refills: 0 | DISCHARGE

## 2020-01-09 RX ADMIN — Medication 3 MILLILITER(S): at 05:56

## 2020-01-09 RX ADMIN — Medication 3 MILLILITER(S): at 00:31

## 2020-01-09 RX ADMIN — Medication 0.5 MILLIGRAM(S): at 05:57

## 2020-01-09 RX ADMIN — MONTELUKAST 10 MILLIGRAM(S): 4 TABLET, CHEWABLE ORAL at 11:30

## 2020-01-09 RX ADMIN — Medication 20 MILLIGRAM(S): at 05:56

## 2020-01-09 RX ADMIN — Medication 81 MILLIGRAM(S): at 11:30

## 2020-01-09 RX ADMIN — Medication 3 MILLILITER(S): at 11:30

## 2020-01-09 RX ADMIN — PANTOPRAZOLE SODIUM 40 MILLIGRAM(S): 20 TABLET, DELAYED RELEASE ORAL at 05:56

## 2020-01-09 RX ADMIN — FINASTERIDE 5 MILLIGRAM(S): 5 TABLET, FILM COATED ORAL at 11:29

## 2020-01-09 RX ADMIN — Medication 1: at 08:08

## 2020-01-09 RX ADMIN — Medication 4 UNIT(S): at 08:08

## 2020-01-09 RX ADMIN — LOSARTAN POTASSIUM 25 MILLIGRAM(S): 100 TABLET, FILM COATED ORAL at 05:56

## 2020-01-09 RX ADMIN — Medication 1: at 12:42

## 2020-01-09 RX ADMIN — Medication 4 UNIT(S): at 12:43

## 2020-01-09 RX ADMIN — Medication 10 MILLIEQUIVALENT(S): at 11:29

## 2020-08-11 ENCOUNTER — LABORATORY RESULT (OUTPATIENT)
Age: 85
End: 2020-08-11

## 2020-08-12 ENCOUNTER — APPOINTMENT (OUTPATIENT)
Dept: CARDIOLOGY | Facility: CLINIC | Age: 85
End: 2020-08-12
Payer: MEDICARE

## 2020-08-12 VITALS
BODY MASS INDEX: 30.96 KG/M2 | HEIGHT: 69 IN | WEIGHT: 209 LBS | DIASTOLIC BLOOD PRESSURE: 82 MMHG | SYSTOLIC BLOOD PRESSURE: 126 MMHG | RESPIRATION RATE: 15 BRPM | HEART RATE: 72 BPM

## 2020-08-12 DIAGNOSIS — R60.0 LOCALIZED EDEMA: ICD-10-CM

## 2020-08-12 PROCEDURE — 99214 OFFICE O/P EST MOD 30 MIN: CPT

## 2020-08-12 PROCEDURE — 93000 ELECTROCARDIOGRAM COMPLETE: CPT

## 2020-08-12 NOTE — REVIEW OF SYSTEMS
[Lower Ext Edema] : lower extremity edema [Negative] : Heme/Lymph [Shortness Of Breath] : no shortness of breath [Dyspnea on exertion] : not dyspnea during exertion [Chest  Pressure] : no chest pressure [Chest Pain] : no chest pain [Leg Claudication] : no intermittent leg claudication [Palpitations] : no palpitations

## 2020-08-12 NOTE — PHYSICAL EXAM
[General Appearance - Well Developed] : well developed [Normal Appearance] : normal appearance [Well Groomed] : well groomed [General Appearance - Well Nourished] : well nourished [No Deformities] : no deformities [General Appearance - In No Acute Distress] : no acute distress [Normal Conjunctiva] : the conjunctiva exhibited no abnormalities [Normal Oral Mucosa] : normal oral mucosa [JVD Elevated _____cm] : JVD elevated [unfilled] ~U cm above clavicle [Normal Jugular Venous A Waves Present] : normal jugular venous A waves present [Normal Jugular Venous V Waves Present] : normal jugular venous V waves present [No Jugular Venous Moe A Waves] : no jugular venous moe A waves [Respiration, Rhythm And Depth] : normal respiratory rhythm and effort [Exaggerated Use Of Accessory Muscles For Inspiration] : no accessory muscle use [Auscultation Breath Sounds / Voice Sounds] : lungs were clear to auscultation bilaterally [Bowel Sounds] : normal bowel sounds [Abdomen Soft] : soft [Abnormal Walk] : normal gait [Nail Clubbing] : no clubbing of the fingernails [Skin Color & Pigmentation] : normal skin color and pigmentation [Cyanosis, Localized] : no localized cyanosis [Skin Turgor] : normal skin turgor [] : no rash [Oriented To Time, Place, And Person] : oriented to person, place, and time [Affect] : the affect was normal [Mood] : the mood was normal [5th Left ICS - MCL] : palpated at the 5th LICS in the midclavicular line [No Anxiety] : not feeling anxious [No Precordial Heave] : no precordial heave was noted [Normal] : normal [Normal Rate] : normal [Normal S1] : normal S1 [Normal S2] : normal S2 [Rhythm Regular] : regular [No Gallop] : no gallop heard [II] : a grade 2 [I] : a grade 1 [No Abnormalities] : the abdominal aorta was not enlarged and no bruit was heard [2+] : left 2+ [___ +] : bilateral [unfilled]U+ pretibial pitting edema [S3] : no S3 [S4] : no S4 [Click] : no click [Pericardial Rub] : no pericardial rub [Right Carotid Bruit] : no bruit heard over the right carotid [Left Carotid Bruit] : no bruit heard over the left carotid [Left Femoral Bruit] : no bruit heard over the left femoral artery [Right Femoral Bruit] : no bruit heard over the right femoral artery

## 2020-08-12 NOTE — DISCUSSION/SUMMARY
[FreeTextEntry1] : This is an 85-year-old male with past medical history significant for diabetes mellitus, status post permanent pacemaker in [place] (July 14, 2020 after syncopal episode when the patient was found to have Mobitz type II heart block),, paroxysmal atrial fibrillation, hypertension, hyperlipidemia, mitral valve regurgitation, history of cerebrovascular event, cholelithiasis viscous bile duct stent, coronary artery disease, who comes in for cardiac followup evaluation.He denies chest pain, chest pressure, PND, dizziness or syncope. The patient is currently mostly wheelchair-bound.\par Electrocardiogram done August 12, 2020 demonstrates evidence for atrial fibrillation with a ventricular paced rhythm at a rate of 70 bpm.  There is poor baseline.\par The patient has 2+ bilateral pedal to lower tibial edema.  He is on Zaroxolyn 2.5 mg Monday Wednesday and Friday and Lasix 40 mg daily.  He continues to get blood work for an INR on a regular basis.\par He is instructed to elevate his feet.  His daughter reports he is receiving wound care from skin breakdown above the ankle.\par The patient will see Dr. Snyder electrophysiology, to establish a relationship with electrophysiology team in [place].\par He is currently hemodynamically stable and will go for regular INR blood work on Coumadin.\par \par The patient has bilateral pedal edema 2+/3 extending from his ankles to the lower tibial area.  He will continue on on Zaroxolyn 2.5 mg on Monday Wednesday and Friday with an additional potassium tablet on Monday Wednesday and Friday,and  Lasix to 40 mg daily.  \par He will elevate his legs and limit his salt intake.  This is most likely dependent edema.\par \par I did inform the patient and his wife again carotid Doppler examination demonstrated a stenosis of 80-99% right internal carotid artery stenosis.  I recommended they see a vascular surgeon.  The wife reports that he has been evaluated for this before the surgeons did not want to operate.  They understand his risk of stroke, and death.\par \par He has a CHADSVASC SCORE OF 6 and remains on anticoagulation with warfarin. He follows up with you regarding his INR.\par He had a cardiac catheterization 2001 which demonstrated 20-30% lesion the right coronary artery.\par . His LDL cholesterol goal should be less than 70 mg/dL.\par Leg elevation, salt restriction, and compression stockings were recommeneded.

## 2020-09-03 ENCOUNTER — APPOINTMENT (OUTPATIENT)
Dept: ELECTROPHYSIOLOGY | Facility: CLINIC | Age: 85
End: 2020-09-03
Payer: MEDICARE

## 2020-09-03 ENCOUNTER — NON-APPOINTMENT (OUTPATIENT)
Age: 85
End: 2020-09-03

## 2020-09-03 VITALS
HEART RATE: 79 BPM | OXYGEN SATURATION: 94 % | SYSTOLIC BLOOD PRESSURE: 102 MMHG | HEIGHT: 62 IN | WEIGHT: 209 LBS | BODY MASS INDEX: 38.46 KG/M2 | DIASTOLIC BLOOD PRESSURE: 67 MMHG

## 2020-09-03 PROCEDURE — 99215 OFFICE O/P EST HI 40 MIN: CPT

## 2020-09-03 PROCEDURE — 93000 ELECTROCARDIOGRAM COMPLETE: CPT | Mod: 59

## 2020-09-03 PROCEDURE — 93280 PM DEVICE PROGR EVAL DUAL: CPT

## 2020-09-03 RX ORDER — GLIPIZIDE 10 MG/1
10 TABLET ORAL
Refills: 0 | Status: ACTIVE | COMMUNITY

## 2020-09-03 RX ORDER — BUDESONIDE 0.5 MG/2ML
0.5 SUSPENSION RESPIRATORY (INHALATION)
Refills: 0 | Status: ACTIVE | COMMUNITY

## 2020-09-03 RX ORDER — IPRATROPIUM BROMIDE AND ALBUTEROL SULFATE 2.5; .5 MG/3ML; MG/3ML
0.5-2.5 (3) SOLUTION RESPIRATORY (INHALATION)
Refills: 0 | Status: ACTIVE | COMMUNITY

## 2020-09-03 RX ORDER — FINASTERIDE 5 MG/1
5 TABLET, FILM COATED ORAL
Refills: 0 | Status: ACTIVE | COMMUNITY

## 2020-09-03 RX ORDER — GLYBURIDE 5 MG/1
5 TABLET ORAL TWICE DAILY
Refills: 0 | Status: DISCONTINUED | COMMUNITY
End: 2020-09-03

## 2020-09-04 NOTE — PHYSICAL EXAM
[General Appearance - Well Developed] : well developed [Well Groomed] : well groomed [Normal Appearance] : normal appearance [No Deformities] : no deformities [General Appearance - In No Acute Distress] : no acute distress [General Appearance - Well Nourished] : well nourished [Heart Sounds] : normal S1 and S2 [Heart Rate And Rhythm] : heart rate and rhythm were normal [Arterial Pulses Normal] : the arterial pulses were normal [Respiration, Rhythm And Depth] : normal respiratory rhythm and effort [Healing Well] : healing well [Left Infraclavicular] : left infraclavicular area [Exaggerated Use Of Accessory Muscles For Inspiration] : no accessory muscle use [Abdomen Soft] : soft [Abdomen Tenderness] : non-tender [] : no hepato-splenomegaly [Nail Clubbing] : no clubbing of the fingernails [Abdomen Mass (___ Cm)] : no abdominal mass palpated [Cyanosis, Localized] : no localized cyanosis [FreeTextEntry1] : systolic murmur heard at LLSB, b/l 2+ pitting edema

## 2020-09-04 NOTE — HISTORY OF PRESENT ILLNESS
[de-identified] : This is a 84 y/o male presenting today for heart block and afib evaluation. PMH of paroxysmal atrial fibrillation, CAD, hyperlipidemia, hypertension, cholelithiasis viscous bile duct stent, mitral regurgitation, h/o CVA (no residual symptoms), diabetes, B/L LE cellulitis. In July, while he was in [place] he had an episode of syncope. He was admitted to [facility] on 07/14/2020 and was noted to be in second degree AV block (Mobitz type II), which was most likely the etiology of his syncopal episode. He underwent  Medtronic dual chamber pacemaker on 07/16/2020. During the hospitalization, he was also noted to have mild lower extremities cellulitis and received antibiotic therapy. He is currently under the care of Dr. Patel. He presents today to establish care and management of his atrial fibrillation.\par \par He reports that he has been doing well since the pacemaker implant. He reports that he is usually not symptomatic while in Afib. His activities are limited due his his cellulitis. He denies any complains of chest pain, palpitations, lightheadedness, dizziness, orthopnea and syncope. \par \par Device interrogation revealed normal function with adequate sensing and pacing threshold. There are no events for review. His AT/AF burden is <0.1%.

## 2020-09-04 NOTE — PROCEDURE
[NSR] : normal sinus rhythm [DDDR] : DDDR [Pacemaker] : pacemaker [Longevity: ___ months] : The estimated remaining battery life is [unfilled] months [Lead Imp:  ___ohms] : lead impedance was [unfilled] ohms [Sensing Amplitude ___mv] : sensing amplitude was [unfilled] mv [___V @] : [unfilled] V [___ ms] : [unfilled] ms [de-identified] : with long first degree block [de-identified] : Medtronic [de-identified] : Dannielle EMERY DR MRI [id number] [de-identified] : 07/16/2020 [de-identified] : [id number] [de-identified] : 60 [de-identified] : Apaced - 9.5% and Vpaced 96.9%

## 2020-09-04 NOTE — DISCUSSION/SUMMARY
[FreeTextEntry1] : This is a 84 y/o pleasant male with recent diagnosis of a second degree AV block status post pacemaker. The device is functioning well with good HR distribution. We discussed remote monitoring device for continuous monitoring. As far as his atrial fibrillation is concerned, he is currently anticoagulated and also managed on BB. He is usually not symptomatic during Afib and has very low burden. We discussed that if his Afib becomes progressive or the burden were to increase over time, we would then revisit Afib management/treatment. \par

## 2020-11-03 ENCOUNTER — APPOINTMENT (OUTPATIENT)
Dept: CARDIOLOGY | Facility: CLINIC | Age: 85
End: 2020-11-03

## 2020-11-05 ENCOUNTER — APPOINTMENT (OUTPATIENT)
Dept: CARDIOLOGY | Facility: CLINIC | Age: 85
End: 2020-11-05

## 2020-12-03 ENCOUNTER — APPOINTMENT (OUTPATIENT)
Dept: ELECTROPHYSIOLOGY | Facility: CLINIC | Age: 85
End: 2020-12-03
Payer: MEDICARE

## 2020-12-03 PROCEDURE — 93294 REM INTERROG EVL PM/LDLS PM: CPT

## 2020-12-03 PROCEDURE — 93296 REM INTERROG EVL PM/IDS: CPT

## 2021-01-01 ENCOUNTER — APPOINTMENT (OUTPATIENT)
Dept: VASCULAR SURGERY | Facility: CLINIC | Age: 86
End: 2021-01-01

## 2021-01-01 ENCOUNTER — NON-APPOINTMENT (OUTPATIENT)
Age: 86
End: 2021-01-01

## 2021-01-01 ENCOUNTER — APPOINTMENT (OUTPATIENT)
Dept: CARDIOLOGY | Facility: CLINIC | Age: 86
End: 2021-01-01

## 2021-01-01 ENCOUNTER — APPOINTMENT (OUTPATIENT)
Dept: ELECTROPHYSIOLOGY | Facility: CLINIC | Age: 86
End: 2021-01-01

## 2021-01-01 ENCOUNTER — APPOINTMENT (OUTPATIENT)
Dept: ELECTROPHYSIOLOGY | Facility: CLINIC | Age: 86
End: 2021-01-01
Payer: MEDICARE

## 2021-01-01 ENCOUNTER — APPOINTMENT (OUTPATIENT)
Dept: VASCULAR SURGERY | Facility: CLINIC | Age: 86
End: 2021-01-01
Payer: MEDICARE

## 2021-01-01 VITALS
HEART RATE: 82 BPM | BODY MASS INDEX: 29.62 KG/M2 | DIASTOLIC BLOOD PRESSURE: 63 MMHG | WEIGHT: 200 LBS | SYSTOLIC BLOOD PRESSURE: 119 MMHG | TEMPERATURE: 97.6 F | HEIGHT: 69 IN

## 2021-01-01 PROCEDURE — 99213 OFFICE O/P EST LOW 20 MIN: CPT

## 2021-01-01 PROCEDURE — 93294 REM INTERROG EVL PM/LDLS PM: CPT | Mod: NC

## 2021-01-01 PROCEDURE — 93296 REM INTERROG EVL PM/IDS: CPT | Mod: NC

## 2021-03-03 ENCOUNTER — NON-APPOINTMENT (OUTPATIENT)
Age: 86
End: 2021-03-03

## 2021-03-03 ENCOUNTER — APPOINTMENT (OUTPATIENT)
Dept: ELECTROPHYSIOLOGY | Facility: CLINIC | Age: 86
End: 2021-03-03
Payer: MEDICARE

## 2021-03-03 PROCEDURE — 93294 REM INTERROG EVL PM/LDLS PM: CPT

## 2021-03-03 PROCEDURE — 93296 REM INTERROG EVL PM/IDS: CPT

## 2021-03-10 ENCOUNTER — APPOINTMENT (OUTPATIENT)
Dept: ELECTROPHYSIOLOGY | Facility: CLINIC | Age: 86
End: 2021-03-10

## 2021-03-19 ENCOUNTER — LABORATORY RESULT (OUTPATIENT)
Age: 86
End: 2021-03-19

## 2021-03-19 ENCOUNTER — NON-APPOINTMENT (OUTPATIENT)
Age: 86
End: 2021-03-19

## 2021-03-19 ENCOUNTER — APPOINTMENT (OUTPATIENT)
Dept: CARDIOLOGY | Facility: CLINIC | Age: 86
End: 2021-03-19
Payer: MEDICARE

## 2021-03-19 VITALS
BODY MASS INDEX: 29.92 KG/M2 | WEIGHT: 202 LBS | SYSTOLIC BLOOD PRESSURE: 129 MMHG | HEART RATE: 76 BPM | DIASTOLIC BLOOD PRESSURE: 81 MMHG | HEIGHT: 69 IN | RESPIRATION RATE: 16 BRPM | OXYGEN SATURATION: 97 %

## 2021-03-19 DIAGNOSIS — I48.0 PAROXYSMAL ATRIAL FIBRILLATION: ICD-10-CM

## 2021-03-19 DIAGNOSIS — Z79.01 LONG TERM (CURRENT) USE OF ANTICOAGULANTS: ICD-10-CM

## 2021-03-19 PROCEDURE — 99214 OFFICE O/P EST MOD 30 MIN: CPT

## 2021-03-19 PROCEDURE — 99072 ADDL SUPL MATRL&STAF TM PHE: CPT

## 2021-03-19 PROCEDURE — 93000 ELECTROCARDIOGRAM COMPLETE: CPT

## 2021-03-29 ENCOUNTER — NON-APPOINTMENT (OUTPATIENT)
Age: 86
End: 2021-03-29

## 2021-05-06 RX ORDER — METOPROLOL TARTRATE 25 MG/1
25 TABLET, FILM COATED ORAL
Qty: 90 | Refills: 1 | Status: ACTIVE | COMMUNITY
Start: 2020-08-18 | End: 1900-01-01

## 2021-05-10 ENCOUNTER — APPOINTMENT (OUTPATIENT)
Dept: ORTHOPEDIC SURGERY | Facility: CLINIC | Age: 86
End: 2021-05-10
Payer: MEDICARE

## 2021-05-10 VITALS
HEIGHT: 69 IN | HEART RATE: 87 BPM | OXYGEN SATURATION: 92 % | BODY MASS INDEX: 30.51 KG/M2 | WEIGHT: 206 LBS | DIASTOLIC BLOOD PRESSURE: 68 MMHG | SYSTOLIC BLOOD PRESSURE: 106 MMHG

## 2021-05-10 DIAGNOSIS — S93.402A SPRAIN OF UNSPECIFIED LIGAMENT OF LEFT ANKLE, INITIAL ENCOUNTER: ICD-10-CM

## 2021-05-10 DIAGNOSIS — Z00.00 ENCOUNTER FOR GENERAL ADULT MEDICAL EXAMINATION W/OUT ABNORMAL FINDINGS: ICD-10-CM

## 2021-05-10 PROCEDURE — 99204 OFFICE O/P NEW MOD 45 MIN: CPT

## 2021-05-10 PROCEDURE — 99072 ADDL SUPL MATRL&STAF TM PHE: CPT

## 2021-05-10 RX ORDER — LEVOTHYROXINE SODIUM 25 UG/1
25 TABLET ORAL
Qty: 90 | Refills: 0 | Status: ACTIVE | COMMUNITY
Start: 2021-05-03

## 2021-05-10 RX ORDER — TRAMADOL HYDROCHLORIDE 50 MG/1
50 TABLET, COATED ORAL TWICE DAILY
Qty: 28 | Refills: 0 | Status: ACTIVE | COMMUNITY
Start: 2021-05-10 | End: 1900-01-01

## 2021-05-14 ENCOUNTER — TRANSCRIPTION ENCOUNTER (OUTPATIENT)
Age: 86
End: 2021-05-14

## 2021-05-21 ENCOUNTER — APPOINTMENT (OUTPATIENT)
Dept: UROLOGY | Facility: CLINIC | Age: 86
End: 2021-05-21
Payer: MEDICARE

## 2021-05-21 VITALS
TEMPERATURE: 96 F | RESPIRATION RATE: 16 BRPM | WEIGHT: 206 LBS | HEIGHT: 69 IN | BODY MASS INDEX: 30.51 KG/M2 | DIASTOLIC BLOOD PRESSURE: 72 MMHG | HEART RATE: 80 BPM | SYSTOLIC BLOOD PRESSURE: 120 MMHG

## 2021-05-21 DIAGNOSIS — J44.9 CHRONIC OBSTRUCTIVE PULMONARY DISEASE, UNSPECIFIED: ICD-10-CM

## 2021-05-21 LAB
BILIRUB UR QL STRIP: NORMAL
CLARITY UR: CLEAR
COLLECTION METHOD: NORMAL
GLUCOSE UR-MCNC: NORMAL
HCG UR QL: 0.2 EU/DL
HGB UR QL STRIP.AUTO: NORMAL
KETONES UR-MCNC: NORMAL
LEUKOCYTE ESTERASE UR QL STRIP: NORMAL
NITRITE UR QL STRIP: NORMAL
PH UR STRIP: 5.5
PROT UR STRIP-MCNC: NORMAL
SP GR UR STRIP: 1.02

## 2021-05-21 PROCEDURE — 51798 US URINE CAPACITY MEASURE: CPT

## 2021-05-21 PROCEDURE — 99072 ADDL SUPL MATRL&STAF TM PHE: CPT

## 2021-05-21 PROCEDURE — 99214 OFFICE O/P EST MOD 30 MIN: CPT

## 2021-05-21 NOTE — REVIEW OF SYSTEMS
[Heart Rate Is Slow] : slow heart rate [see HPI] : see HPI [Seen by urologist before (Name)  ___] : Preciously seen by a urologist: [unfilled] [Wake up at night to urinate  How many times?  ___] : wakes up to urinate [unfilled] times during the night [Slow urine stream] : slow urine stream [Dizziness] : dizziness [Negative] : Heme/Lymph

## 2021-05-31 ENCOUNTER — OUTPATIENT (OUTPATIENT)
Dept: OUTPATIENT SERVICES | Facility: HOSPITAL | Age: 86
LOS: 1 days | End: 2021-05-31
Payer: MEDICARE

## 2021-05-31 ENCOUNTER — APPOINTMENT (OUTPATIENT)
Dept: ULTRASOUND IMAGING | Facility: IMAGING CENTER | Age: 86
End: 2021-05-31
Payer: MEDICARE

## 2021-05-31 DIAGNOSIS — Z98.49 CATARACT EXTRACTION STATUS, UNSPECIFIED EYE: Chronic | ICD-10-CM

## 2021-05-31 DIAGNOSIS — Z98.890 OTHER SPECIFIED POSTPROCEDURAL STATES: Chronic | ICD-10-CM

## 2021-05-31 DIAGNOSIS — R33.9 RETENTION OF URINE, UNSPECIFIED: ICD-10-CM

## 2021-05-31 PROCEDURE — 76775 US EXAM ABDO BACK WALL LIM: CPT

## 2021-05-31 PROCEDURE — 76775 US EXAM ABDO BACK WALL LIM: CPT | Mod: 26

## 2021-06-03 ENCOUNTER — APPOINTMENT (OUTPATIENT)
Dept: ELECTROPHYSIOLOGY | Facility: CLINIC | Age: 86
End: 2021-06-03
Payer: MEDICARE

## 2021-06-03 ENCOUNTER — NON-APPOINTMENT (OUTPATIENT)
Age: 86
End: 2021-06-03

## 2021-06-03 PROCEDURE — 93296 REM INTERROG EVL PM/IDS: CPT

## 2021-06-03 PROCEDURE — 93294 REM INTERROG EVL PM/LDLS PM: CPT

## 2021-06-03 NOTE — PHYSICAL EXAM
[General Appearance - Well Developed] : well developed [Edema] : no peripheral edema [Abdomen Soft] : soft [Normal Station and Gait] : the gait and station were normal for the patient's age [] : no rash [No Focal Deficits] : no focal deficits [Oriented To Time, Place, And Person] : oriented to person, place, and time [FreeTextEntry1] : rectal deferred, given age

## 2021-06-03 NOTE — HISTORY OF PRESENT ILLNESS
[None] : None [Urinary Incontinence] : urinary incontinence [Nocturia] : nocturia [Weak Stream] : weak stream [FreeTextEntry1] : 86 yr old male presents to establish care. Pt referred by PCP for elevated creatinine. Pt was seeing Urologist Dr. Enciso- BPH- on Flomax. Last seen 2 years ago. Pt complains of weak stream, incontinence and not emptying after voiding. \par \par Surgical hx: hernia repair, ERCP, pacemaker 2020 \par Medical hx: HTN, HLD, DM II, CVA 1999, COPD, hypothyroid\par Allergies: NKDA\par Social: Alcohol- occasionally, Smoking-quit 25 yrs, smoked 45 years x 0.5 ppd , Drug- none, Occupation- retired meat market \par Family hx: sister- Renal Ca, brother and sister - lung ca, brother- prostate ca \par \par \par Creatinine\par 1.55 - 5/21\par 1.48 -- 4/21\par 1.85 -- 3/21\par 1.41 -- 8/2020

## 2021-06-03 NOTE — ASSESSMENT
[FreeTextEntry1] : PVR- 705 ml \par \par Pt is comfortable- no fullness. \par Pt is taking Flomax 0.4 mg BID \par \par UA dipstick\par Nit- neg, Lillian- neg, pH 5.5, Blood- negative\par \par plan\par 1) Renal US to assess for hydro\par 2) cysto \par 3) UDS

## 2021-06-04 ENCOUNTER — NON-APPOINTMENT (OUTPATIENT)
Age: 86
End: 2021-06-04

## 2021-06-09 NOTE — ED ADULT TRIAGE NOTE - AS TEMP SITE
CCx: COPD follow up    HPI: Ms. Blackburn is a 61 year old female with severe COPD and chronic hypoxic respiratory failure.  She was recently hospitalized from Feb 23-25 and revisited the ER after completion of her prednisone on 03/05.  She was given more prednisone, but ultimately placed on 60 mg of prednisone a day 2 weeks ago and remains on this now.  She says she has good and bad days.  She coughs every day, but has been free of smoking for 4 months now.  She is wearing her oxygen at 4L a day 24/7 but showed up in clinic without it.  She wants a portable concentrator but states none go up to 4.  She uses her nebulizer 3-4 times a day and keeps up with her spiriva and symbicort.    ROS:  Review of Systems - History obtained from the patient  General ROS: negative  Psychological ROS: negative  ENT ROS: negative  Allergy and Immunology ROS: negative  Endocrine ROS: negative  Respiratory ROS: positive for - cough, shortness of breath and sputum changes  negative for - stridor or tachypnea  Cardiovascular ROS: no chest pain or palpitations  Gastrointestinal ROS: no abdominal pain, change in bowel habits, or black or bloody stools  Genito-Urinary ROS: no dysuria, trouble voiding, or hematuria  Musculoskeletal ROS: negative  Neurological ROS: no TIA or stroke symptoms  Dermatological ROS: negative      Current Meds:  Current Outpatient Prescriptions   Medication Sig     albuterol (PROVENTIL HFA;VENTOLIN HFA) 90 mcg/actuation inhaler Inhale 2 puffs every 4 (four) hours as needed for wheezing.     albuterol (PROVENTIL) 2.5 mg /3 mL (0.083 %) nebulizer solution Take 3 mL (2.5 mg total) by nebulization every 6 (six) hours as needed for wheezing.     bisacodyl (DULCOLAX) 5 mg EC tablet Take 5 mg by mouth daily as needed for constipation. Indications: Constipation     budesonide-formoterol (SYMBICORT) 160-4.5 mcg/actuation inhaler Inhale 1 puff 2 (two) times a day.     nebulizer and compressor Guadalupe Use with albuterol neb  solution up to every 4 hours.     OXYGEN-AIR DELIVERY SYSTEMS [facility] Inhale 3-4 L/hr continuous.      polyethylene glycol (GLYCOLAX) 17 gram/dose powder Take 17 g by mouth daily. Indications: Constipation     predniSONE (DELTASONE) 20 MG tablet Take 60 mg by mouth daily. Indications: COPD     tiotropium bromide (SPIRIVA RESPIMAT) 2.5 mcg/actuation Mist Inhale 1 puff daily.       Labs:  No results found for this or any previous visit (from the past 72 hour(s)).    I have personally reviewed all pertinent imaging studies and PFT results unless otherwise noted.    Imaging studies:  Xr Chest Pa And Lateral    Result Date: 3/5/2017  XR CHEST PA AND LATERAL 3/5/2017 10:05 AM INDICATION: Persistent wheezing. COMPARISON: 2/23/2017 FINDINGS: Heart size and pulmonary vascularity normal. Linear strand of fibrosis or atelectasis mid left lung. The lungs otherwise are clear. Scoliosis.        Physical Exam:  /62  Pulse 84  Resp 18  Wt 136 lb 14.4 oz (62.1 kg)  SpO2 (!) 88% Comment: RA-pt on 4L of O2 but she has it off to conserve tank  BMI 25.87 kg/m2  General - Well nourished  Ears/Mouth - TMs clear bilaterally,  OP pink moist, no thrush  Neck - no cervical lymphadenopathy  Lungs - scattered wheezes, poor air movement  CVS - regular rhythm with no murmurs, rubs or gallups  Abdomen - soft, NT, ND, NABS  Ext - no cyanosis, clubbing or edema  Skin - no rash  Psychology - alert and oriented, answers appropriate      Assessment and Plan:Soila Blackburn is a 61 y.o. with a past medical history significant for severe COPD who presents to clinic today for follow up of a hospitalization.  She was recently prescribed prednisone at a dose of 60mg a day, and has remained on this with no taper since the middle of March.  She needs to be on a taper of this to come down, and if she cannot tolerate lower doses, we will need to start PJP prophylaxis.  She has difficulty following complex care plans and I have worked to explain things as  simply as possible.  We have also redetermined her oxygen requirements today.    1) COPD - She needs to taper down her prednisone.  Starting tomorrow she will take 40 mg for a week, then 20 mg for a week, then 10 mg for a week.   2) Chronic hypoxic respiratory failure  Due to desaturation of SaO2 less than or equal to 88% on room air at rest from COPD, home oxygen therapy will benefit my patient's condition.  The patient has tried (or considered) other medications with limited success and oxygen is still required. The patient is mobile in the home and requires portability.She requires 1L of oxygen at rest and 2L with exertion.  3) RTC in one month        Electronically signed by:    Shane Cobos MD PhD  [facility] Medicine   oral

## 2021-06-09 NOTE — H&P ADULT - ASSESSMENT
85 y/o  M           h/o    HTN,   HLD,   CAD,  T2DM,  CHF (EF 40%) , COPD ,.  s/p  bile  duct stone/  removal   and hx of DVT 17 years ago on Coumadin  , complete  Right  ICA  occlusion.  right renal lesion     admitted  with  weakness/  found  on floor  for hours, a s  he had  slid  off  his  chair/  no head injury    in er  with  weakness,  fevers,  ,   elevated wbc  with  bandemia. KARLOS,  coagulopathy    cxr  , normal   probable pna  with  fevers/   cough/ sob  rocephin/  zmax/  nebs    ct c hest, pending    Afib,  hold  coumadin  daily  inr     follow   wbc   and  creatinine  in am  follow  bp  curve/  meds  lowered  chronic  systolic  and  diastolic   chf/  ef  of  40  . on  lasix       renal  u/s.  to  f/o right renal  lesion  labs in am       < from: Transthoracic Echocardiogram (01.18.17 @ 22:50) >  onclusions:  1. Moderatly  reduced  left ventricular systolic function,  however, endocardial border definition is limited, and  segmental wall-motion assessment is limited.  2. Mild diastolic dysfunction (Stage I).  3. The right ventricle is not well visualized;       g< from: CT Chest No Cont (01.18.17 @ 15:35) >  IMPRESSION:  Centrilobular emphysema with a cluster groundglass nodules in the right   lower lobe, some which appear high density may related to prior   aspiration.   1 cm left lower lobe cyst with wall thickening is similar to 2014. 3 mm   right lower lobe nodule. Recommend follow-up CT in 6 months for complete   evaluation.  < end of copied text >         < from: CT Angio Head w/Cont (08.09.15 @ 17:48) >  IMPRESSION: Demonstration of complete right ICA occlusion. Cross fill to   the right anterior middle cerebral artery circulations from the left  20% left CCA origin narrowing. 33% narrowing proxima left CCA narrowing.  Atherosclerotic calcifications of left cavernous ICA.  No evidence of acute thrombus.  < end of copied text > Posterior Auricular Interpolation Flap Division And Inset Text: Division and inset of the posterior auricular interpolation flap was performed to achieve optimal aesthetic result, restore normal anatomic appearance and avoid distortion of normal anatomy, expedite and facilitate wound healing, achieve optimal functional result and because linear closure either not possible or would produce suboptimal result. The patient was prepped and draped in the usual manner. The pedicle was infiltrated with local anesthesia. The pedicle was sectioned with a #15 blade. The pedicle was de-bulked and trimmed to match the shape of the defect. Hemostasis was achieved. The flap donor site and free margin of the flap were secured with deep buried sutures and the wound edges were re-approximated.

## 2021-07-19 NOTE — H&P ADULT - OPHTHALMOLOGIC
PATIENT CALLED AND STATED THAT THE GABAPENTIN IS NOT WORKING, WHAT'S  SOMETHING  ELSE STRONGER THAT YOU  CAN YOU GIVE HER   negative

## 2021-08-04 ENCOUNTER — OUTPATIENT (OUTPATIENT)
Dept: OUTPATIENT SERVICES | Facility: HOSPITAL | Age: 86
LOS: 1 days | End: 2021-08-04
Payer: MEDICARE

## 2021-08-04 ENCOUNTER — APPOINTMENT (OUTPATIENT)
Dept: UROLOGY | Facility: CLINIC | Age: 86
End: 2021-08-04
Payer: MEDICARE

## 2021-08-04 ENCOUNTER — APPOINTMENT (OUTPATIENT)
Dept: UROLOGY | Facility: CLINIC | Age: 86
End: 2021-08-04

## 2021-08-04 VITALS
HEIGHT: 69 IN | HEART RATE: 86 BPM | TEMPERATURE: 98 F | SYSTOLIC BLOOD PRESSURE: 156 MMHG | OXYGEN SATURATION: 95 % | RESPIRATION RATE: 16 BRPM | DIASTOLIC BLOOD PRESSURE: 76 MMHG | BODY MASS INDEX: 30.51 KG/M2 | WEIGHT: 206 LBS

## 2021-08-04 DIAGNOSIS — N40.1 BENIGN PROSTATIC HYPERPLASIA WITH LOWER URINARY TRACT SYMPTOMS: ICD-10-CM

## 2021-08-04 DIAGNOSIS — R33.9 RETENTION OF URINE, UNSPECIFIED: ICD-10-CM

## 2021-08-04 DIAGNOSIS — Z98.890 OTHER SPECIFIED POSTPROCEDURAL STATES: Chronic | ICD-10-CM

## 2021-08-04 DIAGNOSIS — I44.30 UNSPECIFIED ATRIOVENTRICULAR BLOCK: ICD-10-CM

## 2021-08-04 DIAGNOSIS — N40.1 BENIGN PROSTATIC HYPERPLASIA WITH LOWER URINARY TRACT SYMPMS: ICD-10-CM

## 2021-08-04 DIAGNOSIS — I48.91 UNSPECIFIED ATRIAL FIBRILLATION: ICD-10-CM

## 2021-08-04 DIAGNOSIS — Z98.49 CATARACT EXTRACTION STATUS, UNSPECIFIED EYE: Chronic | ICD-10-CM

## 2021-08-04 DIAGNOSIS — N13.8 BENIGN PROSTATIC HYPERPLASIA WITH LOWER URINARY TRACT SYMPMS: ICD-10-CM

## 2021-08-04 DIAGNOSIS — R35.0 FREQUENCY OF MICTURITION: ICD-10-CM

## 2021-08-04 DIAGNOSIS — N18.30 CHRONIC KIDNEY DISEASE, STAGE 3 UNSPECIFIED: ICD-10-CM

## 2021-08-04 PROCEDURE — 99214 OFFICE O/P EST MOD 30 MIN: CPT | Mod: 25

## 2021-08-04 PROCEDURE — 51741 ELECTRO-UROFLOWMETRY FIRST: CPT | Mod: 26

## 2021-08-04 PROCEDURE — 52000 CYSTOURETHROSCOPY: CPT

## 2021-08-04 PROCEDURE — 51784 ANAL/URINARY MUSCLE STUDY: CPT | Mod: 26

## 2021-08-04 PROCEDURE — 51728 CYSTOMETROGRAM W/VP: CPT

## 2021-08-04 PROCEDURE — 51728 CYSTOMETROGRAM W/VP: CPT | Mod: 26

## 2021-08-04 PROCEDURE — 51797 INTRAABDOMINAL PRESSURE TEST: CPT | Mod: 26

## 2021-08-06 ENCOUNTER — APPOINTMENT (OUTPATIENT)
Dept: UROLOGY | Facility: CLINIC | Age: 86
End: 2021-08-06

## 2021-08-07 ENCOUNTER — EMERGENCY (EMERGENCY)
Facility: HOSPITAL | Age: 86
LOS: 1 days | Discharge: HOME | End: 2021-08-07
Attending: EMERGENCY MEDICINE
Payer: MEDICARE

## 2021-08-07 VITALS
WEIGHT: 199.96 LBS | HEART RATE: 82 BPM | OXYGEN SATURATION: 95 % | DIASTOLIC BLOOD PRESSURE: 61 MMHG | TEMPERATURE: 98 F | SYSTOLIC BLOOD PRESSURE: 127 MMHG | HEIGHT: 70 IN | RESPIRATION RATE: 18 BRPM

## 2021-08-07 DIAGNOSIS — Z98.890 OTHER SPECIFIED POSTPROCEDURAL STATES: Chronic | ICD-10-CM

## 2021-08-07 DIAGNOSIS — Z98.49 CATARACT EXTRACTION STATUS, UNSPECIFIED EYE: Chronic | ICD-10-CM

## 2021-08-07 LAB
APTT BLD: 57.4 SEC — HIGH (ref 27.5–35.5)
INR BLD: 8.6 RATIO — CRITICAL HIGH (ref 0.88–1.16)
PROTHROM AB SERPL-ACNC: 93.2 SEC — HIGH (ref 10.6–13.6)

## 2021-08-07 PROCEDURE — 85610 PROTHROMBIN TIME: CPT

## 2021-08-07 PROCEDURE — 85730 THROMBOPLASTIN TIME PARTIAL: CPT

## 2021-08-07 PROCEDURE — 99284 EMERGENCY DEPT VISIT MOD MDM: CPT

## 2021-08-07 PROCEDURE — 99283 EMERGENCY DEPT VISIT LOW MDM: CPT

## 2021-08-07 NOTE — ED PROVIDER NOTE - NS ED ROS FT
CONSTITUTIONAL: No fevers, no chills, no lightheadedness, no dizziness  Eyes: no visual changes  Ears: no ear drainage, no ear pain  Nose: +nasal congestion, epistaxis  Mouth/Throat: no sore throat  CV: No chest pain, no palpitations  PULM: No SOB, no cough  GI: No n/v/d, no abd pain  : no dysuria, no hematuria  SKIN: no rashes  NEURO: no headache, no focal weakness or numbness  LYMPH/VASC: +LE swelling

## 2021-08-07 NOTE — ED PROVIDER NOTE - OBJECTIVE STATEMENT
87 y/o M with PMH of HTN, HLD, afib on warfarin, CHF, DM presenting with L sided epistaxis x3 days. States today had nose bleed for 3 hours and resolved on its own. Patient normally was able to control bleeding by pinching nose and using ice pack but today was unable to. Denies feeling lightheaded, weak. No SOB/difficulty breathing, cough, runny nose. Feels mild congestion of L nostril at this time.  Patient normally has INR checked every 3 weeks, usually well controlled but has not checked in over 1 month as [place].  Has b/l LE swelling at baseline 85 y/o M with PMH of HTN, HLD, afib on warfarin, CHF, DM presenting with L sided epistaxis x3 days. States today had nose bleed for 3 hours (was not holding pressure) and resolved on its own. Patient normally was able to control bleeding by pinching nose for a few seconds and using ice pack. Denies feeling lightheaded, weak. No SOB/difficulty breathing, cough, runny nose. Feels mild congestion of L nostril at this time.  Patient normally has INR checked every 3 weeks, usually well controlled but has not checked in over 1 month as patient has been out of state.  Has b/l LE swelling at baseline

## 2021-08-07 NOTE — ED ADULT NURSE NOTE - OBJECTIVE STATEMENT
87 yo presents to the ED from home. A&Ox4 via wheelchair c/o L sided epistaxis x3 days. States today had nose bleed for 3 hours and resolved on its own. Patient normally was able to control bleeding by pinching nose and using ice pack but today was unable to. Denies feeling lightheaded, weak. No SOB/difficulty breathing, cough, runny nose. Feels mild congestion of L nostril at this time. history of HTN, HLD, afib on warfarin, CHF, DM. Patient normally has INR checked every 3 weeks, usually well controlled but has not checked in over 1 month as patient has been out of state. pr has b/l LE swelling at baseline. wife at bedside. plan of care discussed. safety and comfort measures maintained.

## 2021-08-07 NOTE — ED ADULT NURSE NOTE - NSIMPLEMENTINTERV_GEN_ALL_ED
Implemented All Universal Safety Interventions:  Clint to call system. Call bell, personal items and telephone within reach. Instruct patient to call for assistance. Room bathroom lighting operational. Non-slip footwear when patient is off stretcher. Physically safe environment: no spills, clutter or unnecessary equipment. Stretcher in lowest position, wheels locked, appropriate side rails in place.

## 2021-08-07 NOTE — ED PROVIDER NOTE - CLINICAL SUMMARY MEDICAL DECISION MAKING FREE TEXT BOX
DO Brooks PGY-3: 87 y/o M presenting with epistaxis from left nare, now resolved. Will check INR to see if supratherapeutic. No intervention at this time. Will likely DC with supportive care, ENT clinic information if needed DO Brooks PGY-3: 85 y/o M presenting with epistaxis from left nare, now resolved. Will check INR to see if supratherapeutic. No intervention at this time. Will likely DC with supportive care, ENT clinic information if needed    Jane Lovell MD - Attending Physician: Pt here with intermittent nose bleeds x 3 days, on Warfarin. No active bleeding, no pallor, VSS. No ENT intervention needed at this time. Will check INR

## 2021-08-07 NOTE — ED ADULT NURSE NOTE - NS_SISCREENINGSR_GEN_ALL_ED
Infant'Negative

## 2021-08-07 NOTE — ED PROVIDER NOTE - CARE PLAN
Principal Discharge DX:	Epistaxis   Principal Discharge DX:	Epistaxis  Secondary Diagnosis:	Elevated INR

## 2021-08-07 NOTE — ED PROVIDER NOTE - PHYSICAL EXAMINATION
gen: obese  Mentation: AAO x 3  psych: mood appropriate  ENT: airway patent, no active bleeding noted, scant blood at left nare  Eyes: conjunctivae clear bilaterally  Cardio: RRR, no m/r/g  Resp: normal BS b/l  GI: s/nt/nd  Neuro: sensation and motor function intact  Skin: No evidence of rash  MSK: normal movement of all extremities  Lymph/Vasc: b/l LE edema

## 2021-08-07 NOTE — ED ADULT TRIAGE NOTE - CHIEF COMPLAINT QUOTE
Epistaxis on coumadin X 3 days, intermittent cessation of blood flow, wife reports this am bleeding was profuse

## 2021-08-07 NOTE — ED PROVIDER NOTE - PATIENT PORTAL LINK FT
You can access the FollowMyHealth Patient Portal offered by [facility] by registering at the following website: [email or web address]. By joining [facility]’s FollowMyHealth portal, you will also be able to view your health information using other applications (apps) compatible with our system.

## 2021-08-10 NOTE — ASSESSMENT
[FreeTextEntry1] : See cysto and urod notes.\par \par \par I had a long discussion with the patient about his voiding symptoms, bladder emptying, and medical and surgical treatments for BPH.\par \par The mechanism of action of Alpha blockers (including both medications and herbal supplement saw palmetto), 5 alpha reductase inhibitors (such as finasteride/dutasteride), and office and surgical procedures such as TURP, TUVP, laser enucleation, laser ablation, urolift, open/robotic excision, embolization of the prostate, all reviewed as far as risks, benefits, outcomes, and short and long term expectations. Chronic hudson, CIC, SP tube all options, and reviewed as to risks, benefits given pt comorbidities and frailties.\par \par Based on his symptoms, exam, lab values, and procedural findings.\par \par pt family will review as to how they wish to proceed; will stay with hudson for now.\par \par \par

## 2021-09-02 ENCOUNTER — INPATIENT (INPATIENT)
Facility: HOSPITAL | Age: 86
LOS: 12 days | Discharge: HOME HEALTH | DRG: 300 | End: 2021-09-15
Attending: INTERNAL MEDICINE | Admitting: INTERNAL MEDICINE
Payer: MEDICARE

## 2021-09-02 ENCOUNTER — APPOINTMENT (OUTPATIENT)
Dept: VASCULAR SURGERY | Facility: CLINIC | Age: 86
End: 2021-09-02
Payer: MEDICARE

## 2021-09-02 ENCOUNTER — APPOINTMENT (OUTPATIENT)
Dept: VASCULAR SURGERY | Facility: CLINIC | Age: 86
End: 2021-09-02

## 2021-09-02 VITALS
HEART RATE: 79 BPM | TEMPERATURE: 97.7 F | BODY MASS INDEX: 30.36 KG/M2 | WEIGHT: 205 LBS | DIASTOLIC BLOOD PRESSURE: 75 MMHG | SYSTOLIC BLOOD PRESSURE: 152 MMHG | HEIGHT: 69 IN

## 2021-09-02 VITALS
WEIGHT: 205.03 LBS | DIASTOLIC BLOOD PRESSURE: 60 MMHG | OXYGEN SATURATION: 95 % | HEIGHT: 70 IN | SYSTOLIC BLOOD PRESSURE: 124 MMHG | HEART RATE: 81 BPM | TEMPERATURE: 98 F | RESPIRATION RATE: 18 BRPM

## 2021-09-02 DIAGNOSIS — M79.674 PAIN IN RIGHT TOE(S): ICD-10-CM

## 2021-09-02 DIAGNOSIS — Z98.890 OTHER SPECIFIED POSTPROCEDURAL STATES: Chronic | ICD-10-CM

## 2021-09-02 DIAGNOSIS — I96 GANGRENE, NOT ELSEWHERE CLASSIFIED: ICD-10-CM

## 2021-09-02 DIAGNOSIS — Z98.49 CATARACT EXTRACTION STATUS, UNSPECIFIED EYE: Chronic | ICD-10-CM

## 2021-09-02 LAB
ALBUMIN SERPL ELPH-MCNC: 3.9 G/DL — SIGNIFICANT CHANGE UP (ref 3.3–5)
ALP SERPL-CCNC: 73 U/L — SIGNIFICANT CHANGE UP (ref 40–120)
ALT FLD-CCNC: 10 U/L — SIGNIFICANT CHANGE UP (ref 10–45)
ANION GAP SERPL CALC-SCNC: 18 MMOL/L — HIGH (ref 5–17)
APTT BLD: 49.2 SEC — HIGH (ref 27.5–35.5)
AST SERPL-CCNC: 15 U/L — SIGNIFICANT CHANGE UP (ref 10–40)
BASE EXCESS BLDV CALC-SCNC: 6.8 MMOL/L — HIGH (ref -2–2)
BASOPHILS # BLD AUTO: 0.1 K/UL — SIGNIFICANT CHANGE UP (ref 0–0.2)
BASOPHILS NFR BLD AUTO: 1 % — SIGNIFICANT CHANGE UP (ref 0–2)
BILIRUB SERPL-MCNC: 0.3 MG/DL — SIGNIFICANT CHANGE UP (ref 0.2–1.2)
BUN SERPL-MCNC: 60 MG/DL — HIGH (ref 7–23)
CA-I SERPL-SCNC: 1.17 MMOL/L — SIGNIFICANT CHANGE UP (ref 1.15–1.33)
CALCIUM SERPL-MCNC: 9.5 MG/DL — SIGNIFICANT CHANGE UP (ref 8.4–10.5)
CHLORIDE BLDV-SCNC: 100 MMOL/L — SIGNIFICANT CHANGE UP (ref 96–108)
CHLORIDE SERPL-SCNC: 99 MMOL/L — SIGNIFICANT CHANGE UP (ref 96–108)
CO2 BLDV-SCNC: 34 MMOL/L — HIGH (ref 22–26)
CO2 SERPL-SCNC: 25 MMOL/L — SIGNIFICANT CHANGE UP (ref 22–31)
CREAT SERPL-MCNC: 1.81 MG/DL — HIGH (ref 0.5–1.3)
CRP SERPL-MCNC: 28 MG/L — HIGH (ref 0–4)
EOSINOPHIL # BLD AUTO: 0.41 K/UL — SIGNIFICANT CHANGE UP (ref 0–0.5)
EOSINOPHIL NFR BLD AUTO: 4.2 % — SIGNIFICANT CHANGE UP (ref 0–6)
GAS PNL BLDV: 139 MMOL/L — SIGNIFICANT CHANGE UP (ref 136–145)
GAS PNL BLDV: SIGNIFICANT CHANGE UP
GAS PNL BLDV: SIGNIFICANT CHANGE UP
GLUCOSE BLDV-MCNC: 149 MG/DL — HIGH (ref 70–99)
GLUCOSE SERPL-MCNC: 152 MG/DL — HIGH (ref 70–99)
HCO3 BLDV-SCNC: 33 MMOL/L — HIGH (ref 22–29)
HCT VFR BLD CALC: 33.2 % — LOW (ref 39–50)
HCT VFR BLDA CALC: 33 % — LOW (ref 39–51)
HGB BLD CALC-MCNC: 11.1 G/DL — LOW (ref 12.6–17.4)
HGB BLD-MCNC: 10.3 G/DL — LOW (ref 13–17)
IMM GRANULOCYTES NFR BLD AUTO: 0.3 % — SIGNIFICANT CHANGE UP (ref 0–1.5)
INR BLD: 2.93 RATIO — HIGH (ref 0.88–1.16)
LACTATE BLDV-MCNC: 2.9 MMOL/L — HIGH (ref 0.7–2)
LYMPHOCYTES # BLD AUTO: 1.78 K/UL — SIGNIFICANT CHANGE UP (ref 1–3.3)
LYMPHOCYTES # BLD AUTO: 18.1 % — SIGNIFICANT CHANGE UP (ref 13–44)
MCHC RBC-ENTMCNC: 28 PG — SIGNIFICANT CHANGE UP (ref 27–34)
MCHC RBC-ENTMCNC: 31 GM/DL — LOW (ref 32–36)
MCV RBC AUTO: 90.2 FL — SIGNIFICANT CHANGE UP (ref 80–100)
MONOCYTES # BLD AUTO: 0.88 K/UL — SIGNIFICANT CHANGE UP (ref 0–0.9)
MONOCYTES NFR BLD AUTO: 8.9 % — SIGNIFICANT CHANGE UP (ref 2–14)
NEUTROPHILS # BLD AUTO: 6.64 K/UL — SIGNIFICANT CHANGE UP (ref 1.8–7.4)
NEUTROPHILS NFR BLD AUTO: 67.5 % — SIGNIFICANT CHANGE UP (ref 43–77)
NRBC # BLD: 0 /100 WBCS — SIGNIFICANT CHANGE UP (ref 0–0)
PCO2 BLDV: 53 MMHG — SIGNIFICANT CHANGE UP (ref 42–55)
PH BLDV: 7.4 — SIGNIFICANT CHANGE UP (ref 7.32–7.43)
PLATELET # BLD AUTO: 209 K/UL — SIGNIFICANT CHANGE UP (ref 150–400)
PO2 BLDV: 19 MMHG — LOW (ref 25–45)
POTASSIUM BLDV-SCNC: 3.7 MMOL/L — SIGNIFICANT CHANGE UP (ref 3.5–5.1)
POTASSIUM SERPL-MCNC: 3.5 MMOL/L — SIGNIFICANT CHANGE UP (ref 3.5–5.3)
POTASSIUM SERPL-SCNC: 3.5 MMOL/L — SIGNIFICANT CHANGE UP (ref 3.5–5.3)
PROT SERPL-MCNC: 6.9 G/DL — SIGNIFICANT CHANGE UP (ref 6–8.3)
PROTHROM AB SERPL-ACNC: 33.4 SEC — HIGH (ref 10.6–13.6)
RBC # BLD: 3.68 M/UL — LOW (ref 4.2–5.8)
RBC # FLD: 15.3 % — HIGH (ref 10.3–14.5)
SAO2 % BLDV: 22.6 % — LOW (ref 67–88)
SARS-COV-2 RNA SPEC QL NAA+PROBE: SIGNIFICANT CHANGE UP
SODIUM SERPL-SCNC: 142 MMOL/L — SIGNIFICANT CHANGE UP (ref 135–145)
WBC # BLD: 9.84 K/UL — SIGNIFICANT CHANGE UP (ref 3.8–10.5)
WBC # FLD AUTO: 9.84 K/UL — SIGNIFICANT CHANGE UP (ref 3.8–10.5)

## 2021-09-02 PROCEDURE — 99285 EMERGENCY DEPT VISIT HI MDM: CPT

## 2021-09-02 PROCEDURE — 99203 OFFICE O/P NEW LOW 30 MIN: CPT

## 2021-09-02 PROCEDURE — XXXXX: CPT

## 2021-09-02 PROCEDURE — 73630 X-RAY EXAM OF FOOT: CPT | Mod: 26,RT

## 2021-09-02 RX ORDER — ACETAMINOPHEN 500 MG
650 TABLET ORAL ONCE
Refills: 0 | Status: COMPLETED | OUTPATIENT
Start: 2021-09-02 | End: 2021-09-03

## 2021-09-02 NOTE — REASON FOR VISIT
[Initial Evaluation] : an initial evaluation [FreeTextEntry1] : peripheral vascular disease with right foot wound

## 2021-09-02 NOTE — PROGRESS NOTE ADULT - ASSESSMENT
Pt is 85yo who presents w/ possible early ischemic changes B/L forefoot  -pt was seen and examined  -Afebrile, WBC 9.8, CRP 28  - R forefoot cool to touch, w/ dependent rubor, 3rd digit dorsal erythema w/ no fluctuance, no acute signs of infection. L forefoot cool to touch w/ dependent rubor, small dry eschars at distal digits 1, 2, 3. B/L posterior ankle eschars, dry and stable.  Edema B/L lower extremities.  -No acute podiatric intervention at this time, pod stable for discharge  -Will defer to vascular surgery for further management and disposition  -Discussed w/ attending Pt is 87yo who presents w/ possible early ischemic changes B/L forefoot  -pt was seen and examined  -Afebrile, WBC 9.8, CRP 28  - R forefoot cool to touch, w/ dependent rubor, 3rd digit dorsal erythema w/ no fluctuance, no acute signs of infection. L forefoot cool to touch w/ dependent rubor, small dry eschars at distal digits 1, 2, 3. B/L posterior ankle eschars, dry and stable.  Edema B/L lower extremities.  -No acute podiatric intervention at this time, pod stable for discharge  -Will defer to vascular surgery for further management and disposition  -Can follow up w/ Dr. Lam at [phone]  -Discussed w/ attending Pt is 85yo who presents w/ possible early ischemic changes B/L forefoot  -pt was seen and examined  -Afebrile, WBC 9.8, CRP 28  - R forefoot cool to touch, w/ dependent rubor, 3rd digit dorsal erythema w/ no fluctuance, no acute signs of infection. L forefoot cool to touch w/ dependent rubor, small dry eschars at distal digits 1, 2, 3. B/L posterior ankle eschars, dry and stable.  Edema B/L lower extremities.  -No acute podiatric intervention at this time, pod stable for discharge  -Will defer to vascular surgery for further management and disposition  -Can follow up w/ Dr. Lam at [phone]  -Discussed w/ attending

## 2021-09-02 NOTE — ED PROVIDER NOTE - PHYSICAL EXAMINATION
Gen: WDWN, NAD  HEENT: EOMI, no nasal discharge, mucous membranes moist  CV:  2+ radial pulses b/l.   Resp: no accessory muscle use, no increased work of breathing  GI: Abdomen soft non-distended, NTTP  MSK: 1+ b/l LE edema. extremities warm/well perfused  Derm: + 1cm superficial abraison/ulceration to R 4th toe.   Neuro: A&Ox4, following commands, moving all four extremities spontaneously  Psych: appropriate mood

## 2021-09-02 NOTE — PHYSICAL EXAM
[JVD] : no jugular venous distention  [Respiratory Effort] : normal respiratory effort [Normal Rate and Rhythm] : normal rate and rhythm [2+] : left 2+ [0] : left 0 [Ankle Swelling (On Exam)] : present [Ankle Swelling Bilaterally] : bilaterally  [Varicose Veins Of Lower Extremities] : not present [] : bilaterally [Ankle Swelling On The Left] : moderate [Skin Ulcer] : ulcer [Alert] : alert [Oriented to Person] : oriented to person [Oriented to Place] : oriented to place [Oriented to Time] : oriented to time [Calm] : calm [de-identified] : appears stated age [de-identified] : normocephalic, atraumatic [de-identified] : right 2nd toe ischemic appearing with new blister formation. right third toe with gangrenous changes at tip. right 4th toe with superficial ulceration, clean, no pus.

## 2021-09-02 NOTE — PROGRESS NOTE ADULT - SUBJECTIVE AND OBJECTIVE BOX
Podiatry pager #: [phone]/ [phone]    Patient is a 86y old  Male who presents with a chief complaint of foot pain.    HPI:    85 y/o M with PMH of HTN, HLD, Afib on coumadin, CHF, DM presents to ED sent in by vascular (Dr. Tato Quinn) c/o R toe pain and discoloration. Was seen by vascular today with concern got PAD and gangrene.  Referred to ED for medicine admission. Denies fever, chills.    Patient was seen as a tele QDOC patient. The patient will be seen and further worked up in the main emergency department and their care will be completed by the main emergency department team along with a thorough physical exam. Receiving team will follow up on labs, analgesia, any clinical imaging, reassess and disposition as clinically indicated, all decisions regarding the progression of care will be made at their discretion.    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    Deep Vein Thrombosis (DVT)  17 yeras ago on Coumadin    CHF (congestive heart failure)  last exacerbation in 1/2017    Enlarged prostate    GERD (gastroesophageal reflux disease)    Hernia  umblical    Calculus of bile duct without cholangitis or cholecystitis without obstruction    Atrial fibrillation    S/P Hernia Repair    S/P cataract surgery, unspecified laterality    S/P ERCP  3/2017        MEDICATIONS  (STANDING):    MEDICATIONS  (PRN):      Allergies    No Known Allergies    Intolerances        VITALS:    Vital Signs Last 24 Hrs  T(C): 36.5 (02 Sep 2021 15:22), Max: 36.5 (02 Sep 2021 15:22)  T(F): 97.7 (02 Sep 2021 15:22), Max: 97.7 (02 Sep 2021 15:22)  HR: 81 (02 Sep 2021 15:22) (81 - 81)  BP: 124/60 (02 Sep 2021 15:22) (124/60 - 124/60)  BP(mean): --  RR: 18 (02 Sep 2021 15:22) (18 - 18)  SpO2: 95% (02 Sep 2021 15:22) (95% - 95%)    LABS:                          10.3   9.84  )-----------( 209      ( 02 Sep 2021 16:41 )             33.2       09-02    142  |  99  |  60<H>  ----------------------------<  152<H>  3.5   |  25  |  1.81<H>    Ca    9.5      02 Sep 2021 16:41    TPro  6.9  /  Alb  3.9  /  TBili  0.3  /  DBili  x   /  AST  15  /  ALT  10  /  AlkPhos  73  09-02      CAPILLARY BLOOD GLUCOSE          PT/INR - ( 02 Sep 2021 16:41 )   PT: 33.4 sec;   INR: 2.93 ratio         PTT - ( 02 Sep 2021 16:41 )  PTT:49.2 sec    LOWER EXTREMITY PHYSICAL EXAM:    Vasular: DP/PT NP, B/L, CFT >3s seconds B/L, Temperature gradient B/L forefoot cool to touch   Neuro: Epicritic sensation intact to the level of digits B/L.  Musculoskeletal/Ortho: unremarkable    Skin: R forefoot cool to touch, w/ dependent rubor, 3rd digit dorsal erythema w/ no fluctuance, no acute signs of infection. L forefoot cool to touch w/ dependent rubor, small dry eschars at distal digits 1, 2, 3. B/L posterior ankle eschars, dry and stable.  Edema B/L lower extremities.      RADIOLOGY & ADDITIONAL STUDIES:

## 2021-09-02 NOTE — ED ADULT NURSE NOTE - OBJECTIVE STATEMENT
85 yo M pmh CHF, COPD, CVA brought in via wheelchair to ED from PCP c/o right 3rd and 4rth toe pain and wound with concern for gangrene.  Pt states that he is able to ambulate at home with a walker.  Denies CP, back pain, SOB, fevers/chills, n/v/d, lightheadedness, dizziness, changes in urinary or bowel habits.  A&Ox4, pt requires 2 person assist to get up and move from wheelchair to bed, skin w/d, wound noted on the 3rd and 4rth right toes, tender to palpation.  +2 pitting edema noted BLE, difficulty obtaining peripheral pulses, MD acquiring doppler for further assessment. Abdomen soft, nondistended, obese, nontender, periumbilical hernia noted that is reducible by MD.  Pt has IV established for quick Doc RN.  Safety and comfort maintained.  Will continue to monitor.

## 2021-09-02 NOTE — ED ADULT NURSE REASSESSMENT NOTE - NS ED NURSE REASSESS COMMENT FT1
Pt is resting comfortably, denies needing anything at this time.  Pending recommendations from consult MDs.  Safety and comfort maintained.  will continue to monitor.

## 2021-09-02 NOTE — CONSULT NOTE ADULT - ASSESSMENT
Mr. Heart is a 87 y/o M with PMH of HTN, HLD, Afib on coumadin, CHF, DM presenting with worsening PAD and toe gangrene.    Recommendations  - admit to medicine for medical optimization   - plan for LE angio next week  - elevated lactate likely secondary to HF, less likely from toe wound  - ID consult for gangrenous toe  - cardiology consult for optimization prior to OR  - podiatry consulted - no acute intervention indicated  - please continue ASA and statin  - ALMAZ/PVR with toe pressures ordered  - discussed with fellow, Dr. Wolf Stiles, PGY2  Vascular Surgery  [phone]

## 2021-09-02 NOTE — ED PROVIDER NOTE - NS ED ROS FT
Gen: Denies fevers  Skin: + R 4th toe ulceration  Msk: + R foot pain  all other ROS negative unless indicated in HPI

## 2021-09-02 NOTE — ED PROVIDER NOTE - OBJECTIVE STATEMENT
85 y/o M with PMH of HTN, HLD, Afib on coumadin, CHF, DM presents to ED sent in by vascular (Dr. Tato Quinn) c/o R toe pain and discoloration. endorses chronic b/l feet pain, denies leg pain with ambulation. no fevers. + ulceration R 4th toe.

## 2021-09-02 NOTE — ED PROVIDER NOTE - CLINICAL SUMMARY MEDICAL DECISION MAKING FREE TEXT BOX
Ashwini Foster MD, PGY-2: 87 y/o M with PMH of HTN, HLD, Afib on coumadin, CHF, DM presents to ED sent in by vascular (Dr. Tato Quinn) c/o R toe pain and ulceration/discoloration. extremity warm/well perfused. suspect PAD, low suspicion necrotizing fasciitis given minimal erythema, low suspicion OM but possible given immunocompromised and open wound. toe does not appear consistent with gangrene (wet or dry). low suspicion ischemic foot given foot appears warm/well perfused. plan for vascular/podiatry consult, doppler for b/l pulses, basic labs, xray.

## 2021-09-02 NOTE — CONSULT NOTE ADULT - SUBJECTIVE AND OBJECTIVE BOX
Vascular Surgery Consult Note    History of Present Illness  Mr. Heart is a 85 y/o M with PMH of HTN, HLD, Afib on coumadin, CHF, DM sent in the ED by Dr. Quinn after being seen in office today with concern for worsening PAD and toe gangrene. He reports that he developed a blister on his right     Patient currently denies headache, dizziness, weakness, fevers, chills, shortness of breath, chest pain, abdominal pain, or nausea/vomiting.    PAST MEDICAL HISTORY: Diabetes Mellitus    Hypertension    CVA (Cerebral Vascular Accident)    Chronic Obstructive Pulmonary Disease (COPD)    Obstructive Sleep Apnea    Mycobacterium Avium-Intracellulare Infection    Deep Vein Thrombosis (DVT)    CHF (congestive heart failure)    Enlarged prostate    GERD (gastroesophageal reflux disease)    Hernia    Calculus of bile duct without cholangitis or cholecystitis without obstruction    Atrial fibrillation        PAST SURGICAL HISTORY: S/P Hernia Repair    S/P cataract surgery, unspecified laterality    S/P ERCP        HOME MEDICATIONS:    ALLERGIES: No Known Allergies      FAMILY HISTORY: No pertinent family history in first degree relatives    Family hx of lung cancer        SOCIAL HISTORY:    REVIEW OF SYSTEMS:    VITAL SIGNS:  ICU Vital Signs Last 24 Hrs  T(C): 36.5 (02 Sep 2021 15:22), Max: 36.5 (02 Sep 2021 15:22)  T(F): 97.7 (02 Sep 2021 15:22), Max: 97.7 (02 Sep 2021 15:22)  HR: 81 (02 Sep 2021 15:22) (81 - 81)  BP: 124/60 (02 Sep 2021 15:22) (124/60 - 124/60)  BP(mean): --  ABP: --  ABP(mean): --  RR: 18 (02 Sep 2021 15:22) (18 - 18)  SpO2: 95% (02 Sep 2021 15:22) (95% - 95%)      PHYSICAL EXAMINATION:  General - well-nourished, no acute distress  Neuro - awake, alert, oriented x4, no acute focal deficits  HEENT - normocephalic, PERRL, moist mucous membranes  Lungs - clear to auscultation bilaterally, right chest wall tenderness  Heart - regular rate and rhythm, S1S2 / irregularly irregular  Abdomen - soft, nontender, nondistended  Extremities - all four extremities are warm & pink with 2+ pulses, strength 5/5, sensation intact    LABS:                          10.3   9.84  )-----------( 209      ( 02 Sep 2021 16:41 )             33.2       09-02    142  |  99  |  60<H>  ----------------------------<  152<H>  3.5   |  25  |  1.81<H>    Ca    9.5      02 Sep 2021 16:41    TPro  6.9  /  Alb  3.9  /  TBili  0.3  /  DBili  x   /  AST  15  /  ALT  10  /  AlkPhos  73  09-02      PT/INR - ( 02 Sep 2021 16:41 )   PT: 33.4 sec;   INR: 2.93 ratio         PTT - ( 02 Sep 2021 16:41 )  PTT:49.2 sec        VBG - ( 02 Sep 2021 16:40 )  pH: 7.40  /  pCO2: 53    /  pO2: 19    / HCO3: 33    / Base Excess: 6.8   /  SaO2: 22.6   Lactate: 2.9                RECENT CULTURES:      CAPILLARY BLOOD GLUCOSE          IMAGING STUDIES: Vascular Surgery Consult Note    History of Present Illness  Mr. Heart is a 85 y/o M with PMH of HTN, HLD, Afib on coumadin, CHF, DM sent in the ED by Dr. Quinn after being seen in office today with concern for worsening PAD and toe gangrene. He reports that he has had months of swelling and discoloration of his toes. One month ago he developed a blister on his right fourth toe after his wife stepped on it. His podiatrist opened it up and it has not healed. He reports worsening pain in his right great toe at night. He ambulates with a walker and denies symptoms of claudication in bilateral lower extremities. He has had no changes in sensation but endorses some weakness of the R foot. He has not had fevers or chills. In the ED, he is afebrile and hemodynamically stable, labs notable for normal WBC, CRP 28 and lactate 2.9.    Patient currently denies headache, dizziness, weakness, fevers, chills, shortness of breath, chest pain, abdominal pain, or nausea/vomiting.    PAST MEDICAL HISTORY: Diabetes Mellitus    Hypertension    CVA (Cerebral Vascular Accident)    Chronic Obstructive Pulmonary Disease (COPD)    Obstructive Sleep Apnea    Mycobacterium Avium-Intracellulare Infection    Deep Vein Thrombosis (DVT)    CHF (congestive heart failure)    Enlarged prostate    GERD (gastroesophageal reflux disease)    Hernia    Calculus of bile duct without cholangitis or cholecystitis without obstruction    Atrial fibrillation        PAST SURGICAL HISTORY: S/P Hernia Repair    S/P cataract surgery, unspecified laterality    S/P ERCP        HOME MEDICATIONS:    ALLERGIES: No Known Allergies      FAMILY HISTORY: No pertinent family history in first degree relatives    Family hx of lung cancer        SOCIAL HISTORY:    REVIEW OF SYSTEMS:    VITAL SIGNS:  ICU Vital Signs Last 24 Hrs  T(C): 36.5 (02 Sep 2021 15:22), Max: 36.5 (02 Sep 2021 15:22)  T(F): 97.7 (02 Sep 2021 15:22), Max: 97.7 (02 Sep 2021 15:22)  HR: 81 (02 Sep 2021 15:22) (81 - 81)  BP: 124/60 (02 Sep 2021 15:22) (124/60 - 124/60)  BP(mean): --  ABP: --  ABP(mean): --  RR: 18 (02 Sep 2021 15:22) (18 - 18)  SpO2: 95% (02 Sep 2021 15:22) (95% - 95%)      PHYSICAL EXAMINATION:  General - lying in stretcher, not in acute distress  Neuro - awake, alert, oriented x4, no acute focal deficits  HEENT - normocephalic, PERRL, moist mucous membranes  Lungs - mild wheezing, saturating well on room air   Heart - regular rate on monitor  Abdomen - soft, nontender, umblical hernia present  Extremities - edema and changes of chronic venous insufficiency in bilateral lower extremities                     ischemic changes of toes bilaterally; right 2nd toe blister, gangrenous changes of right 3rd toe, superficial ulcer of right 4th toe; small areas of necrosis of left 1st and 2nd toes  Pulse exam  bilateral femoral pulses palpable  bilateral popliteal signals present  right DP signal present  no L DP or b/l PT signals    LABS:                          10.3   9.84  )-----------( 209      ( 02 Sep 2021 16:41 )             33.2       09-02    142  |  99  |  60<H>  ----------------------------<  152<H>  3.5   |  25  |  1.81<H>    Ca    9.5      02 Sep 2021 16:41    TPro  6.9  /  Alb  3.9  /  TBili  0.3  /  DBili  x   /  AST  15  /  ALT  10  /  AlkPhos  73  09-02      PT/INR - ( 02 Sep 2021 16:41 )   PT: 33.4 sec;   INR: 2.93 ratio         PTT - ( 02 Sep 2021 16:41 )  PTT:49.2 sec        VBG - ( 02 Sep 2021 16:40 )  pH: 7.40  /  pCO2: 53    /  pO2: 19    / HCO3: 33    / Base Excess: 6.8   /  SaO2: 22.6   Lactate: 2.9                RECENT CULTURES:      CAPILLARY BLOOD GLUCOSE          IMAGING STUDIES:

## 2021-09-02 NOTE — ED PROVIDER NOTE - RAPID ASSESSMENT
85 y/o M with PMH of HTN, HLD, Afib on coumadin, CHF, DM presents to ED sent in by vascular (Dr. Tato Quinn) c/o R toe pain and discoloration. Was seen by vascular today and referred to ED for medicine admission. Denies fever, chills.    Patient was seen as a tele QDOC patient. The patient will be seen and further worked up in the main emergency department and their care will be completed by the main emergency department team along with a thorough physical exam. Receiving team will follow up on labs, analgesia, any clinical imaging, reassess and disposition as clinically indicated, all decisions regarding the progression of care will be made at their discretion.    Scribe Statement: I, Beka Anna, attest that this documentation has been prepared under the direction and in the presence of Barry Smith(PA) 85 y/o M with PMH of HTN, HLD, Afib on coumadin, CHF, DM presents to ED sent in by vascular (Dr. Tato Quinn) c/o R toe pain and discoloration. Was seen by vascular today with concern got PAD and gangrene.  Referred to ED for medicine admission. Denies fever, chills.    Patient was seen as a tele QDOC patient. The patient will be seen and further worked up in the main emergency department and their care will be completed by the main emergency department team along with a thorough physical exam. Receiving team will follow up on labs, analgesia, any clinical imaging, reassess and disposition as clinically indicated, all decisions regarding the progression of care will be made at their discretion.    Scribe Statement: I, Beka Anna, attest that this documentation has been prepared under the direction and in the presence of Barry Smith(PA)    Rapid assessment performed by Barry miranda PA-C. Full evaluation to be performed in ED. Above documentation completed by scribe. I was present for and agree with documentation.   Barry Smith PA-C

## 2021-09-02 NOTE — HISTORY OF PRESENT ILLNESS
[FreeTextEntry1] : 86 year old male who presents for initial evaluation of peripheral vascular disease with right toe ischemia and ulceration. He states both his legs have been swollen and discolored for a very long time. However, in the last several months he has developed worsening discoloration of his toes on his right foot. More recently, he had a blister that was opened by his podiatrist and currently that wound is still open. He denies any fevers or chills. He denies pain or numbness in either of his feet. He denies claudication or rest pain.

## 2021-09-02 NOTE — ASSESSMENT
[FreeTextEntry1] : Problem #1 Peripheral vascular disease\par - Eliazar Class 5 chronic limb threatening ischemia\par - Very concerned about the appearance of his toes with new tissue loss and degree of ischemia\par - Mixed arterial and venous disease makes treatment more difficult\par - Encouraged patient to go to [facility] emergency room for medical admission\par - Will need medical and cardiac optimization and risk stratification, podiatry consult, imaging of his foot, cardiology consult, infectious disease consult and non-invasive arterial physiologic studies (ALMAZ/PVRs with toe pressures)\par - Will need right lower extremity angiogram with possible revascularization on this admission\par \par Problem #2 Chronic venous insufficiency\par - Both lower legs are edematous with skin darkening, dry and flaky skin\par - No venous ulcer discovered on exam\par - Cannot compress due to arterial disease\par - Will treat arterial disease first [Arterial/Venous Disease] : arterial/venous disease

## 2021-09-03 ENCOUNTER — TRANSCRIPTION ENCOUNTER (OUTPATIENT)
Age: 86
End: 2021-09-03

## 2021-09-03 DIAGNOSIS — L97.519 NON-PRESSURE CHRONIC ULCER OF OTHER PART OF RIGHT FOOT WITH UNSPECIFIED SEVERITY: ICD-10-CM

## 2021-09-03 DIAGNOSIS — J44.9 CHRONIC OBSTRUCTIVE PULMONARY DISEASE, UNSPECIFIED: ICD-10-CM

## 2021-09-03 DIAGNOSIS — R09.89 OTHER SPECIFIED SYMPTOMS AND SIGNS INVOLVING THE CIRCULATORY AND RESPIRATORY SYSTEMS: ICD-10-CM

## 2021-09-03 DIAGNOSIS — I50.42 CHRONIC COMBINED SYSTOLIC (CONGESTIVE) AND DIASTOLIC (CONGESTIVE) HEART FAILURE: ICD-10-CM

## 2021-09-03 DIAGNOSIS — N40.0 BENIGN PROSTATIC HYPERPLASIA WITHOUT LOWER URINARY TRACT SYMPTOMS: ICD-10-CM

## 2021-09-03 DIAGNOSIS — E11.9 TYPE 2 DIABETES MELLITUS WITHOUT COMPLICATIONS: ICD-10-CM

## 2021-09-03 DIAGNOSIS — I48.20 CHRONIC ATRIAL FIBRILLATION, UNSPECIFIED: ICD-10-CM

## 2021-09-03 DIAGNOSIS — I10 ESSENTIAL (PRIMARY) HYPERTENSION: ICD-10-CM

## 2021-09-03 DIAGNOSIS — I73.9 PERIPHERAL VASCULAR DISEASE, UNSPECIFIED: ICD-10-CM

## 2021-09-03 LAB
A1C WITH ESTIMATED AVERAGE GLUCOSE RESULT: 7.4 % — HIGH (ref 4–5.6)
ANION GAP SERPL CALC-SCNC: 11 MMOL/L — SIGNIFICANT CHANGE UP (ref 5–17)
ANION GAP SERPL CALC-SCNC: 14 MMOL/L — SIGNIFICANT CHANGE UP (ref 5–17)
APTT BLD: 48.3 SEC — HIGH (ref 27.5–35.5)
BUN SERPL-MCNC: 49 MG/DL — HIGH (ref 7–23)
BUN SERPL-MCNC: 50 MG/DL — HIGH (ref 7–23)
CALCIUM SERPL-MCNC: 9.5 MG/DL — SIGNIFICANT CHANGE UP (ref 8.4–10.5)
CALCIUM SERPL-MCNC: 9.6 MG/DL — SIGNIFICANT CHANGE UP (ref 8.4–10.5)
CHLORIDE SERPL-SCNC: 101 MMOL/L — SIGNIFICANT CHANGE UP (ref 96–108)
CHLORIDE SERPL-SCNC: 102 MMOL/L — SIGNIFICANT CHANGE UP (ref 96–108)
CO2 SERPL-SCNC: 27 MMOL/L — SIGNIFICANT CHANGE UP (ref 22–31)
CO2 SERPL-SCNC: 29 MMOL/L — SIGNIFICANT CHANGE UP (ref 22–31)
COVID-19 SPIKE DOMAIN AB INTERP: POSITIVE
COVID-19 SPIKE DOMAIN ANTIBODY RESULT: >250 U/ML — HIGH
CREAT SERPL-MCNC: 1.44 MG/DL — HIGH (ref 0.5–1.3)
CREAT SERPL-MCNC: 1.65 MG/DL — HIGH (ref 0.5–1.3)
ESTIMATED AVERAGE GLUCOSE: 166 MG/DL — HIGH (ref 68–114)
GLUCOSE BLDC GLUCOMTR-MCNC: 103 MG/DL — HIGH (ref 70–99)
GLUCOSE BLDC GLUCOMTR-MCNC: 249 MG/DL — HIGH (ref 70–99)
GLUCOSE BLDC GLUCOMTR-MCNC: 262 MG/DL — HIGH (ref 70–99)
GLUCOSE BLDC GLUCOMTR-MCNC: 283 MG/DL — HIGH (ref 70–99)
GLUCOSE BLDC GLUCOMTR-MCNC: 97 MG/DL — SIGNIFICANT CHANGE UP (ref 70–99)
GLUCOSE SERPL-MCNC: 251 MG/DL — HIGH (ref 70–99)
GLUCOSE SERPL-MCNC: 93 MG/DL — SIGNIFICANT CHANGE UP (ref 70–99)
HCT VFR BLD CALC: 33.1 % — LOW (ref 39–50)
HGB BLD-MCNC: 10.3 G/DL — LOW (ref 13–17)
INR BLD: 3.59 RATIO — HIGH (ref 0.88–1.16)
LACTATE BLDV-MCNC: 1.6 MMOL/L — SIGNIFICANT CHANGE UP (ref 0.7–2)
MAGNESIUM SERPL-MCNC: 1.8 MG/DL — SIGNIFICANT CHANGE UP (ref 1.6–2.6)
MCHC RBC-ENTMCNC: 28 PG — SIGNIFICANT CHANGE UP (ref 27–34)
MCHC RBC-ENTMCNC: 31.1 GM/DL — LOW (ref 32–36)
MCV RBC AUTO: 89.9 FL — SIGNIFICANT CHANGE UP (ref 80–100)
NRBC # BLD: 0 /100 WBCS — SIGNIFICANT CHANGE UP (ref 0–0)
PHOSPHATE SERPL-MCNC: 2.8 MG/DL — SIGNIFICANT CHANGE UP (ref 2.5–4.5)
PLATELET # BLD AUTO: 203 K/UL — SIGNIFICANT CHANGE UP (ref 150–400)
POTASSIUM SERPL-MCNC: 2.9 MMOL/L — CRITICAL LOW (ref 3.5–5.3)
POTASSIUM SERPL-MCNC: 3.8 MMOL/L — SIGNIFICANT CHANGE UP (ref 3.5–5.3)
POTASSIUM SERPL-SCNC: 2.9 MMOL/L — CRITICAL LOW (ref 3.5–5.3)
POTASSIUM SERPL-SCNC: 3.8 MMOL/L — SIGNIFICANT CHANGE UP (ref 3.5–5.3)
PROTHROM AB SERPL-ACNC: 40.5 SEC — HIGH (ref 10.6–13.6)
RBC # BLD: 3.68 M/UL — LOW (ref 4.2–5.8)
RBC # FLD: 15.1 % — HIGH (ref 10.3–14.5)
SARS-COV-2 IGG+IGM SERPL QL IA: >250 U/ML — HIGH
SARS-COV-2 IGG+IGM SERPL QL IA: POSITIVE
SODIUM SERPL-SCNC: 139 MMOL/L — SIGNIFICANT CHANGE UP (ref 135–145)
SODIUM SERPL-SCNC: 145 MMOL/L — SIGNIFICANT CHANGE UP (ref 135–145)
WBC # BLD: 6.96 K/UL — SIGNIFICANT CHANGE UP (ref 3.8–10.5)
WBC # FLD AUTO: 6.96 K/UL — SIGNIFICANT CHANGE UP (ref 3.8–10.5)

## 2021-09-03 PROCEDURE — 99223 1ST HOSP IP/OBS HIGH 75: CPT

## 2021-09-03 PROCEDURE — 93010 ELECTROCARDIOGRAM REPORT: CPT | Mod: 76

## 2021-09-03 PROCEDURE — 99231 SBSQ HOSP IP/OBS SF/LOW 25: CPT

## 2021-09-03 RX ORDER — DEXTROSE 50 % IN WATER 50 %
15 SYRINGE (ML) INTRAVENOUS ONCE
Refills: 0 | Status: DISCONTINUED | OUTPATIENT
Start: 2021-09-03 | End: 2021-09-08

## 2021-09-03 RX ORDER — ACETAMINOPHEN 500 MG
650 TABLET ORAL EVERY 6 HOURS
Refills: 0 | Status: COMPLETED | OUTPATIENT
Start: 2021-09-03 | End: 2021-09-07

## 2021-09-03 RX ORDER — LOSARTAN POTASSIUM 100 MG/1
100 TABLET, FILM COATED ORAL DAILY
Refills: 0 | Status: DISCONTINUED | OUTPATIENT
Start: 2021-09-03 | End: 2021-09-07

## 2021-09-03 RX ORDER — POTASSIUM CHLORIDE 20 MEQ
1 PACKET (EA) ORAL
Qty: 0 | Refills: 0 | DISCHARGE

## 2021-09-03 RX ORDER — IPRATROPIUM/ALBUTEROL SULFATE 18-103MCG
3 AEROSOL WITH ADAPTER (GRAM) INHALATION
Qty: 0 | Refills: 0 | DISCHARGE

## 2021-09-03 RX ORDER — POTASSIUM CHLORIDE 20 MEQ
10 PACKET (EA) ORAL
Refills: 0 | Status: COMPLETED | OUTPATIENT
Start: 2021-09-03 | End: 2021-09-03

## 2021-09-03 RX ORDER — FUROSEMIDE 40 MG
20 TABLET ORAL
Refills: 0 | Status: DISCONTINUED | OUTPATIENT
Start: 2021-09-03 | End: 2021-09-07

## 2021-09-03 RX ORDER — MONTELUKAST 4 MG/1
10 TABLET, CHEWABLE ORAL DAILY
Refills: 0 | Status: DISCONTINUED | OUTPATIENT
Start: 2021-09-03 | End: 2021-09-08

## 2021-09-03 RX ORDER — DEXTROSE 50 % IN WATER 50 %
12.5 SYRINGE (ML) INTRAVENOUS ONCE
Refills: 0 | Status: DISCONTINUED | OUTPATIENT
Start: 2021-09-03 | End: 2021-09-08

## 2021-09-03 RX ORDER — LANOLIN ALCOHOL/MO/W.PET/CERES
3 CREAM (GRAM) TOPICAL AT BEDTIME
Refills: 0 | Status: DISCONTINUED | OUTPATIENT
Start: 2021-09-03 | End: 2021-09-08

## 2021-09-03 RX ORDER — FINASTERIDE 5 MG/1
5 TABLET, FILM COATED ORAL DAILY
Refills: 0 | Status: DISCONTINUED | OUTPATIENT
Start: 2021-09-03 | End: 2021-09-08

## 2021-09-03 RX ORDER — SODIUM CHLORIDE 9 MG/ML
1000 INJECTION, SOLUTION INTRAVENOUS
Refills: 0 | Status: DISCONTINUED | OUTPATIENT
Start: 2021-09-03 | End: 2021-09-08

## 2021-09-03 RX ORDER — POTASSIUM CHLORIDE 20 MEQ
40 PACKET (EA) ORAL EVERY 4 HOURS
Refills: 0 | Status: COMPLETED | OUTPATIENT
Start: 2021-09-03 | End: 2021-09-04

## 2021-09-03 RX ORDER — GLUCAGON INJECTION, SOLUTION 0.5 MG/.1ML
1 INJECTION, SOLUTION SUBCUTANEOUS ONCE
Refills: 0 | Status: DISCONTINUED | OUTPATIENT
Start: 2021-09-03 | End: 2021-09-08

## 2021-09-03 RX ORDER — INSULIN LISPRO 100/ML
VIAL (ML) SUBCUTANEOUS AT BEDTIME
Refills: 0 | Status: DISCONTINUED | OUTPATIENT
Start: 2021-09-03 | End: 2021-09-08

## 2021-09-03 RX ORDER — BUDESONIDE, MICRONIZED 100 %
0.5 POWDER (GRAM) MISCELLANEOUS
Refills: 0 | Status: DISCONTINUED | OUTPATIENT
Start: 2021-09-03 | End: 2021-09-08

## 2021-09-03 RX ORDER — INSULIN GLARGINE 100 [IU]/ML
25 INJECTION, SOLUTION SUBCUTANEOUS
Qty: 0 | Refills: 0 | DISCHARGE

## 2021-09-03 RX ORDER — DEXTROSE 50 % IN WATER 50 %
25 SYRINGE (ML) INTRAVENOUS ONCE
Refills: 0 | Status: DISCONTINUED | OUTPATIENT
Start: 2021-09-03 | End: 2021-09-08

## 2021-09-03 RX ORDER — MUPIROCIN 20 MG/G
1 OINTMENT TOPICAL
Refills: 0 | Status: DISCONTINUED | OUTPATIENT
Start: 2021-09-03 | End: 2021-09-08

## 2021-09-03 RX ORDER — INSULIN GLARGINE 100 [IU]/ML
25 INJECTION, SOLUTION SUBCUTANEOUS AT BEDTIME
Refills: 0 | Status: DISCONTINUED | OUTPATIENT
Start: 2021-09-03 | End: 2021-09-08

## 2021-09-03 RX ORDER — ACETAMINOPHEN 500 MG
650 TABLET ORAL EVERY 6 HOURS
Refills: 0 | Status: DISCONTINUED | OUTPATIENT
Start: 2021-09-03 | End: 2021-09-03

## 2021-09-03 RX ORDER — ONDANSETRON 8 MG/1
4 TABLET, FILM COATED ORAL EVERY 8 HOURS
Refills: 0 | Status: DISCONTINUED | OUTPATIENT
Start: 2021-09-03 | End: 2021-09-08

## 2021-09-03 RX ORDER — ASPIRIN/CALCIUM CARB/MAGNESIUM 324 MG
81 TABLET ORAL DAILY
Refills: 0 | Status: DISCONTINUED | OUTPATIENT
Start: 2021-09-03 | End: 2021-09-08

## 2021-09-03 RX ORDER — LEVOTHYROXINE SODIUM 125 MCG
25 TABLET ORAL DAILY
Refills: 0 | Status: DISCONTINUED | OUTPATIENT
Start: 2021-09-03 | End: 2021-09-08

## 2021-09-03 RX ORDER — LANOLIN ALCOHOL/MO/W.PET/CERES
3 CREAM (GRAM) TOPICAL AT BEDTIME
Refills: 0 | Status: DISCONTINUED | OUTPATIENT
Start: 2021-09-03 | End: 2021-09-03

## 2021-09-03 RX ORDER — METOPROLOL TARTRATE 50 MG
25 TABLET ORAL DAILY
Refills: 0 | Status: DISCONTINUED | OUTPATIENT
Start: 2021-09-03 | End: 2021-09-08

## 2021-09-03 RX ORDER — ATORVASTATIN CALCIUM 80 MG/1
40 TABLET, FILM COATED ORAL AT BEDTIME
Refills: 0 | Status: DISCONTINUED | OUTPATIENT
Start: 2021-09-03 | End: 2021-09-08

## 2021-09-03 RX ORDER — PANTOPRAZOLE SODIUM 20 MG/1
40 TABLET, DELAYED RELEASE ORAL
Refills: 0 | Status: DISCONTINUED | OUTPATIENT
Start: 2021-09-03 | End: 2021-09-08

## 2021-09-03 RX ORDER — MAGNESIUM SULFATE 500 MG/ML
2 VIAL (ML) INJECTION ONCE
Refills: 0 | Status: COMPLETED | OUTPATIENT
Start: 2021-09-03 | End: 2021-09-03

## 2021-09-03 RX ORDER — TAMSULOSIN HYDROCHLORIDE 0.4 MG/1
0.8 CAPSULE ORAL AT BEDTIME
Refills: 0 | Status: DISCONTINUED | OUTPATIENT
Start: 2021-09-03 | End: 2021-09-08

## 2021-09-03 RX ORDER — INSULIN LISPRO 100/ML
VIAL (ML) SUBCUTANEOUS
Refills: 0 | Status: DISCONTINUED | OUTPATIENT
Start: 2021-09-03 | End: 2021-09-08

## 2021-09-03 RX ADMIN — TAMSULOSIN HYDROCHLORIDE 0.8 MILLIGRAM(S): 0.4 CAPSULE ORAL at 21:02

## 2021-09-03 RX ADMIN — Medication 40 MILLIEQUIVALENT(S): at 21:09

## 2021-09-03 RX ADMIN — PANTOPRAZOLE SODIUM 40 MILLIGRAM(S): 20 TABLET, DELAYED RELEASE ORAL at 06:59

## 2021-09-03 RX ADMIN — Medication 650 MILLIGRAM(S): at 14:00

## 2021-09-03 RX ADMIN — FINASTERIDE 5 MILLIGRAM(S): 5 TABLET, FILM COATED ORAL at 12:01

## 2021-09-03 RX ADMIN — Medication 50 GRAM(S): at 20:49

## 2021-09-03 RX ADMIN — Medication 20 MILLIGRAM(S): at 05:43

## 2021-09-03 RX ADMIN — Medication 20 MILLIGRAM(S): at 17:57

## 2021-09-03 RX ADMIN — MONTELUKAST 10 MILLIGRAM(S): 4 TABLET, CHEWABLE ORAL at 12:02

## 2021-09-03 RX ADMIN — Medication 650 MILLIGRAM(S): at 06:58

## 2021-09-03 RX ADMIN — Medication 650 MILLIGRAM(S): at 01:00

## 2021-09-03 RX ADMIN — Medication 25 MICROGRAM(S): at 05:44

## 2021-09-03 RX ADMIN — MUPIROCIN 1 APPLICATION(S): 20 OINTMENT TOPICAL at 17:56

## 2021-09-03 RX ADMIN — Medication 3: at 17:56

## 2021-09-03 RX ADMIN — Medication 100 MILLIEQUIVALENT(S): at 17:57

## 2021-09-03 RX ADMIN — ATORVASTATIN CALCIUM 40 MILLIGRAM(S): 80 TABLET, FILM COATED ORAL at 21:09

## 2021-09-03 RX ADMIN — Medication 650 MILLIGRAM(S): at 00:31

## 2021-09-03 RX ADMIN — Medication 3: at 12:51

## 2021-09-03 RX ADMIN — Medication 0.5 MILLIGRAM(S): at 17:57

## 2021-09-03 RX ADMIN — Medication 25 MILLIGRAM(S): at 05:44

## 2021-09-03 RX ADMIN — Medication 650 MILLIGRAM(S): at 12:51

## 2021-09-03 RX ADMIN — LOSARTAN POTASSIUM 100 MILLIGRAM(S): 100 TABLET, FILM COATED ORAL at 05:44

## 2021-09-03 RX ADMIN — Medication 81 MILLIGRAM(S): at 12:02

## 2021-09-03 RX ADMIN — Medication 0.5 MILLIGRAM(S): at 05:44

## 2021-09-03 RX ADMIN — Medication 100 MILLIEQUIVALENT(S): at 15:54

## 2021-09-03 RX ADMIN — INSULIN GLARGINE 25 UNIT(S): 100 INJECTION, SOLUTION SUBCUTANEOUS at 21:52

## 2021-09-03 RX ADMIN — Medication 650 MILLIGRAM(S): at 07:24

## 2021-09-03 RX ADMIN — Medication 100 MILLIEQUIVALENT(S): at 12:16

## 2021-09-03 NOTE — PROGRESS NOTE ADULT - ASSESSMENT
86M with PMH of HTN, HLD, Afib on coumadin, CHF, T2DM on insulin, COPD, CVA, BPH p/w R toe pain with non healing ulcer c/f PAD.  none

## 2021-09-03 NOTE — PROGRESS NOTE ADULT - SUBJECTIVE AND OBJECTIVE BOX
Patient is a 86y old  Male who presents with a chief complaint of R toe pain (03 Sep 2021 14:47)      SUBJECTIVE / OVERNIGHT EVENTS:    Events noted.  CONSTITUTIONAL: No fever,  or fatigue  RESPIRATORY: No cough, wheezing,  No shortness of breath  CARDIOVASCULAR: No chest pain, palpitations, dizziness, or leg swelling  GASTROINTESTINAL: No abdominal or epigastric pain. No nausea, vomiting.  NEUROLOGICAL: No headaches,     MEDICATIONS  (STANDING):  aspirin  chewable 81 milliGRAM(s) Oral daily  atorvastatin 40 milliGRAM(s) Oral at bedtime  buDESOnide    Inhalation Suspension 0.5 milliGRAM(s) Inhalation two times a day  dextrose 40% Gel 15 Gram(s) Oral once  dextrose 5%. 1000 milliLiter(s) (50 mL/Hr) IV Continuous <Continuous>  dextrose 5%. 1000 milliLiter(s) (100 mL/Hr) IV Continuous <Continuous>  dextrose 50% Injectable 25 Gram(s) IV Push once  dextrose 50% Injectable 12.5 Gram(s) IV Push once  dextrose 50% Injectable 25 Gram(s) IV Push once  finasteride 5 milliGRAM(s) Oral daily  furosemide    Tablet 20 milliGRAM(s) Oral two times a day  glucagon  Injectable 1 milliGRAM(s) IntraMuscular once  insulin glargine Injectable (LANTUS) 25 Unit(s) SubCutaneous at bedtime  insulin lispro (ADMELOG) corrective regimen sliding scale   SubCutaneous three times a day before meals  insulin lispro (ADMELOG) corrective regimen sliding scale   SubCutaneous at bedtime  levothyroxine 25 MICROGram(s) Oral daily  losartan 100 milliGRAM(s) Oral daily  metolazone 2.5 milliGRAM(s) Oral <User Schedule>  metoprolol succinate ER 25 milliGRAM(s) Oral daily  montelukast 10 milliGRAM(s) Oral daily  mupirocin 2% Ointment 1 Application(s) Topical two times a day  pantoprazole    Tablet 40 milliGRAM(s) Oral before breakfast  potassium chloride    Tablet ER 40 milliEquivalent(s) Oral every 4 hours  tamsulosin 0.8 milliGRAM(s) Oral at bedtime    MEDICATIONS  (PRN):  acetaminophen   Tablet .. 650 milliGRAM(s) Oral every 6 hours PRN Temp greater or equal to 38C (100.4F), Mild Pain (1 - 3)  aluminum hydroxide/magnesium hydroxide/simethicone Suspension 30 milliLiter(s) Oral every 4 hours PRN Dyspepsia  melatonin 3 milliGRAM(s) Oral at bedtime PRN Insomnia  ondansetron Injectable 4 milliGRAM(s) IV Push every 8 hours PRN Nausea and/or Vomiting        CAPILLARY BLOOD GLUCOSE      POCT Blood Glucose.: 249 mg/dL (03 Sep 2021 21:40)  POCT Blood Glucose.: 283 mg/dL (03 Sep 2021 17:14)  POCT Blood Glucose.: 262 mg/dL (03 Sep 2021 12:29)  POCT Blood Glucose.: 97 mg/dL (03 Sep 2021 08:37)  POCT Blood Glucose.: 103 mg/dL (03 Sep 2021 02:09)    I&O's Summary    02 Sep 2021 07:01  -  03 Sep 2021 07:00  --------------------------------------------------------  IN: 250 mL / OUT: 250 mL / NET: 0 mL    03 Sep 2021 07:01  -  03 Sep 2021 22:53  --------------------------------------------------------  IN: 480 mL / OUT: 200 mL / NET: 280 mL        T(C): 36.4 (09-03-21 @ 21:08), Max: 36.8 (09-03-21 @ 00:28)  HR: 76 (09-03-21 @ 21:08) (76 - 88)  BP: 139/68 (09-03-21 @ 21:08) (120/63 - 141/56)  RR: 18 (09-03-21 @ 21:08) (18 - 18)  SpO2: 95% (09-03-21 @ 21:08) (95% - 99%)    PHYSICAL EXAM:  GENERAL: NAD  NECK: Supple, No JVD  CHEST/LUNG: Clear to auscultation bilaterally; No wheezing.  HEART: Regular rate and rhythm; No murmurs, rubs, or gallops  ABDOMEN: Soft, Nontender, Nondistended; Bowel sounds present  EXTREMITIES:   No edema  NEUROLOGY: AAO X 3      LABS:                        10.3   6.96  )-----------( 203      ( 03 Sep 2021 11:00 )             33.1     09-03    139  |  101  |  50<H>  ----------------------------<  251<H>  3.8   |  27  |  1.65<H>    Ca    9.6      03 Sep 2021 21:23  Phos  2.8     09-03  Mg     1.8     09-03    TPro  6.9  /  Alb  3.9  /  TBili  0.3  /  DBili  x   /  AST  15  /  ALT  10  /  AlkPhos  73  09-02    PT/INR - ( 03 Sep 2021 11:00 )   PT: 40.5 sec;   INR: 3.59 ratio         PTT - ( 03 Sep 2021 11:00 )  PTT:48.3 sec        CAPILLARY BLOOD GLUCOSE      POCT Blood Glucose.: 249 mg/dL (03 Sep 2021 21:40)  POCT Blood Glucose.: 283 mg/dL (03 Sep 2021 17:14)  POCT Blood Glucose.: 262 mg/dL (03 Sep 2021 12:29)  POCT Blood Glucose.: 97 mg/dL (03 Sep 2021 08:37)  POCT Blood Glucose.: 103 mg/dL (03 Sep 2021 02:09)        RADIOLOGY & ADDITIONAL TESTS:    Imaging Personally Reviewed:    Consultant(s) Notes Reviewed:      Care Discussed with Consultants/Other Providers:    Graham Pulliam MD, CMD, FACP    [id number] [place] [address]  Office Tel: [phone]  Cell: [phone]

## 2021-09-03 NOTE — H&P ADULT - PROBLEM SELECTOR PLAN 1
pt with worsening R great toe pain, dependent rubor on toes of b/l feet and non healing ulcer c/f PAD   - vascular surgery following recs appreciated   - check ALMAZ/PVRs  - tentative plan for LE angiogram next week  - needs cardiology consult for clearance prior to procedure   - c/w ASA, statin  - will c/t follow

## 2021-09-03 NOTE — H&P ADULT - PROBLEM SELECTOR PLAN 2
pt with R 4th toe ulcer, appears clean, no purulence, no e/o overlying cellulitis - low suspicion for active infection   podiatry eval appreciated - no e/o acute infection, no podiatric interventions planned   c/w wound care

## 2021-09-03 NOTE — H&P ADULT - PROBLEM SELECTOR PLAN 4
chronic afib, currently rate controlled   c/w mteoprolol  c/w coumadin 5mg daily, INR 2.9 - trend daily  f/u with vascular if needs to be held prior to LE angiogram  cardiology consult in AM

## 2021-09-03 NOTE — DISCHARGE NOTE PROVIDER - CARE PROVIDERS DIRECT ADDRESSES
[email or web address]

## 2021-09-03 NOTE — DISCHARGE NOTE PROVIDER - NSDCFUADDINST_GEN_ALL_CORE_FT
Podiatry Discharge Instructions:  Follow up: Please follow up with Dr. Schwartz within 1 week of discharge from the hospital, please call [phone] for appointment and discuss that you recently were seen in the hospital.  Wound Care: please apply betadine to R 3rd toe every other day  Weight bearing: Please weight bear as tolerated

## 2021-09-03 NOTE — H&P ADULT - ASSESSMENT
86M with PMH of HTN, HLD, Afib on coumadin, CHF, T2DM on insulin, COPD, CVA, BPH p/w R toe pain with non healing ulcer c/f PAD.

## 2021-09-03 NOTE — DISCHARGE NOTE PROVIDER - HOSPITAL COURSE
86M with PMH of HTN, HLD, Afib on coumadin, CHF, T2DM on insulin, COPD, CVA, BPH p/w R toe pain with non healing ulcer c/f PAD.     Problem/Plan - 1:  ·  Problem: Peripheral artery disease.   ·  Plan: pt with worsening R great toe pain, dependent rubor on toes of b/l feet and non healing ulcer c/f PAD   - vascular surgery follow up appreciated.  - S/p LE angio  - c/w ASA, statin.     Problem/Plan - 2:  ·  Problem: Toe ulcer, right.   ·  Plan: pt with R 4th toe ulcer, appears clean, no purulence, no e/o overlying cellulitis -   podiatry/ID eval appreciated - no e/o acute infection, no podiatric interventions planned   c/w wound care  Monitor off abx.     Problem/Plan - 3:  ·  Problem: Chronic combined systolic and diastolic heart failure.   ·  Plan: h/o CHF with moderately reduced LV systolic dysfunction and stage 1 diastolic dysfunction   currently appears euvolemic  c/w lasix and metolazone   c/w GDMT with metoprolol and losartan.     Problem/Plan - 4:  ·  Problem: Chronic atrial fibrillation.   ·  Plan: chronic afib, currently rate controlled   c/w metoprolol  IV Heparin/Coumadin    Dc planning once INR therapeutic.     Problem/Plan - 5:  ·  Problem: Type 2 diabetes mellitus treated with insulin.   ·  Plan: FSSS  Lantus.     Problem/Plan - 6:  ·  Problem: HTN (hypertension).   ·  Plan: BP stable, at goal  c/w home medications  routine monitoring.     Problem/Plan - 7:  ·  Problem: Chronic obstructive pulmonary disease, unspecified COPD type.   ·  Plan: COPD, no wheezing on exam, denies SOB  c/w pulmicort and singulair.     Problem/Plan - 8:  ·  Problem: BPH (benign prostatic hyperplasia).   ·  Plan: c/w flomax and finasteride.      Medically cleared for discharge. PT recommended VAUGHN, pt requesting home with homecare 86M with PMH of HTN, HLD, Afib on coumadin, CHF, T2DM on insulin, COPD, CVA, BPH p/w R toe pain with non healing ulcer c/f PAD.     Peripheral artery disease.   ·  Plan: pt with worsening R great toe pain, dependent rubor on toes of b/l feet and non healing ulcer c/f PAD   - vascular surgery follow up appreciated.  - S/p LE angio  - c/w ASA, statin.    Toe ulcer, right.   ·  Plan: pt with R 4th toe ulcer, appears clean, no purulence, no e/o overlying cellulitis -   podiatry/ID eval appreciated - no e/o acute infection, no podiatric interventions planned   c/w wound care  Monitor off abx.    Chronic combined systolic and diastolic heart failure.   ·  Plan: h/o CHF with moderately reduced LV systolic dysfunction and stage 1 diastolic dysfunction   currently appears euvolemic  c/w lasix and metolazone   c/w GDMT with metoprolol and losartan.    Chronic atrial fibrillation.   ·  Plan: chronic afib, currently rate controlled   c/w metoprolol  IV Heparin/Coumadin    Type 2 diabetes mellitus treated with insulin.   ·  Plan: FSSS  Lantus.    HTN (hypertension).   ·  Plan: BP stable, at goal  c/w home medications  routine monitoring.    Chronic obstructive pulmonary disease, unspecified COPD type.   ·  Plan: COPD, no wheezing on exam, denies SOB  c/w pulmicort and singulair.    BPH (benign prostatic hyperplasia).   ·  Plan: c/w flomax and finasteride.      Medically cleared for discharge. PT recommended VAUGHN, pt requesting home with homecare 86M with PMH of HTN, HLD, Afib on coumadin, CHF, T2DM on insulin, COPD, CVA, BPH p/w R toe pain with non healing ulcer c/f PAD.     Peripheral artery disease.   ·  Plan: pt with worsening R great toe pain, dependent rubor on toes of b/l feet and non healing ulcer c/f PAD   - vascular surgery follow up appreciated.  - S/p diagnostic LE angio  - c/w ASA, statin.    Toe ulcer, right.   ·  Plan: pt with R 4th toe ulcer, appears clean, no purulence, no e/o overlying cellulitis -   podiatry/ID eval appreciated - no e/o acute infection, no podiatric interventions planned   c/w wound care  Monitor off abx.    Chronic combined systolic and diastolic heart failure.   ·  Plan: h/o CHF with moderately reduced LV systolic dysfunction and stage 1 diastolic dysfunction   currently appears euvolemic  c/w lasix and metolazone   c/w GDMT with metoprolol and losartan.    Chronic atrial fibrillation.   ·  Plan: chronic afib, currently rate controlled   c/w metoprolol  IV Heparin/Coumadin    Type 2 diabetes mellitus treated with insulin.   ·  Plan: FSSS  Lantus.    HTN (hypertension).   ·  Plan: BP stable, at goal  c/w home medications  routine monitoring.    Chronic obstructive pulmonary disease, unspecified COPD type.   ·  Plan: COPD, no wheezing on exam, denies SOB  c/w pulmicort and singulair.    BPH (benign prostatic hyperplasia).   ·  Plan: c/w flomax and finasteride.      Medically cleared for discharge. PT recommended VAUGHN, pt requesting home with homecare

## 2021-09-03 NOTE — PROGRESS NOTE ADULT - ASSESSMENT
86M with PMH of HTN, HLD, Afib on coumadin, CHF, T2DM on insulin, COPD, CVA, BPH p/w R toe pain with non healing ulcer c/f PAD.    Plan:  - Pain control  - Follow cardiology recs  - Follow podiatry recs.   - Follow ID recs  - Continue home meds  - Regular diet  - PT consult  - Fu on ALMAZ/ PCR  exam.       Vascular team    [id number]

## 2021-09-03 NOTE — H&P ADULT - PROBLEM SELECTOR PLAN 3
h/o CHF with moderately reduced LV systolic dysfunction and stage 1 diastolic dysfunction   currently appears euvolemic  c/w lasix and metolazone   c/w GDMT with metoprolol and losartan   cardiology consult in AM

## 2021-09-03 NOTE — H&P ADULT - NSHPPHYSICALEXAM_GEN_ALL_CORE
Vital Signs Last 24 Hrs  T(C): 36.8 (03 Sep 2021 00:28), Max: 36.8 (03 Sep 2021 00:28)  T(F): 98.2 (03 Sep 2021 00:28), Max: 98.2 (03 Sep 2021 00:28)  HR: 86 (03 Sep 2021 00:28) (81 - 88)  BP: 138/70 (03 Sep 2021 00:28) (124/60 - 141/56)  BP(mean): --  RR: 18 (03 Sep 2021 00:28) (18 - 18)  SpO2: 96% (03 Sep 2021 00:28) (95% - 96%)    PHYSICAL EXAM:  GENERAL: NAD, well-developed  HEAD:  Atraumatic, normocephalic  EYES: EOMI, conjunctiva and sclera clear  NECK: Supple, no JVD  CHEST/LUNG: Clear to auscultation bilaterally; no wheezing or rales  HEART: Regular rate and rhythm; no murmurs  ABDOMEN: Soft, nontender, nondistended; bowel sounds present  EXTREMITIES: +b/l LE edema, dependent rugor in b/l toes, +pain on palpation of R great toe, clean ulcer on R 4th toe without purulence or e/o infection    PSYCH: calm affect, not anxious  NEUROLOGY: non-focal, AAOx3, moving all extremities equally   SKIN: No rashes or lesions  MUSCULOSKELETAL: no back pain, no joint swelling or tenderness

## 2021-09-03 NOTE — CONSULT NOTE ADULT - SUBJECTIVE AND OBJECTIVE BOX
Patient is a 86y old  Male who presents with a chief complaint of R toe pain (03 Sep 2021 12:08)      HPI:  86M with PMH of HTN, HLD, Afib on coumadin, CHF, T2DM on insulin, COPD, CVA, BPH p/w R toe pain and ulcer. Pt states he has been having weeks of R great toe pain with discoloration - pain is worse at night, severe, disturbs his sleep, located only along R great toe without radiation; has noted all his toes have been more red during the same time, but denies any pain in his other toes or L foot. Pain is well controlled during the day with tylenol and pt is still able to ambulate as normal. A couple of week ago, pt wife stepped on his toe which led to a blister on his 4th R toe; went to podiatry who lanced the blister but it has not been healing well despite local wound care; denies any significant pain or drainage. Pt endorses intact sensation in b/l LE. Went to see vascular Dr Moy in the office and was referred to ED for c/f PAD and possible gangrene.   Denies any associated fevers, chills, nausea/vomiting, LE swelling outside his baseline, no calf pain, no other skin changes. Rest of ROS negative.  (03 Sep 2021 02:08)      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    Chronic Obstructive Pulmonary Disease (COPD)    Obstructive Sleep Apnea    Mycobacterium Avium-Intracellulare Infection  2009    Deep Vein Thrombosis (DVT)  17 yeras ago on Coumadin    CHF (congestive heart failure)  last exacerbation in 2017    Enlarged prostate    GERD (gastroesophageal reflux disease)    Hernia  umblical    Calculus of bile duct without cholangitis or cholecystitis without obstruction    Atrial fibrillation    S/P Hernia Repair    S/P cataract surgery, unspecified laterality    S/P ERCP  3/2017        Social history:   Marital Status:   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 Fevers absent    Skin: nonhealing ulcer right nares   	  Ophthalmologic:Denies any visual complaints,discharge redness or photophobia  	  ENMT:No nasal discharge,headache,sinus congestion or throat pain.No dental complaints    Respiratory and Thorax:No cough,sputum or chest pain.Denies shortness of breath  	  Cardiovascular:	No chest pain,palpitaions or dizziness    Gastrointestinal:	NO nausea,abdominal pain or diarrhea.    Genitourinary:	No dysuria,frequency. No flank pain    Musculoskeletal:	RLE weakness pain in toe    Neurological:No confusion,diziness.No extremity weakness.No bladder or bowel incontinence	    Psychiatric:No delusions or hallucinations	    Hematology/Lymphatics:	No LN swelling.No gum bleeding     Endocrine:	No recent weight gain or loss.No abnormal heat/cold intolerance    Allergic/Immunologic:	No hives or rash     Allergies    No Known Allergies    Intolerances        Antimicrobials:       MEDICATIONS  (prior antimicrobials ):                 MEDICATIONS  (STANDING):  aspirin  chewable 81 milliGRAM(s) Oral daily  atorvastatin 40 milliGRAM(s) Oral at bedtime  buDESOnide    Inhalation Suspension 0.5 milliGRAM(s) Inhalation two times a day  dextrose 40% Gel 15 Gram(s) Oral once  dextrose 5%. 1000 milliLiter(s) (50 mL/Hr) IV Continuous <Continuous>  dextrose 5%. 1000 milliLiter(s) (100 mL/Hr) IV Continuous <Continuous>  dextrose 50% Injectable 25 Gram(s) IV Push once  dextrose 50% Injectable 12.5 Gram(s) IV Push once  dextrose 50% Injectable 25 Gram(s) IV Push once  finasteride 5 milliGRAM(s) Oral daily  furosemide    Tablet 20 milliGRAM(s) Oral two times a day  glucagon  Injectable 1 milliGRAM(s) IntraMuscular once  insulin glargine Injectable (LANTUS) 25 Unit(s) SubCutaneous at bedtime  insulin lispro (ADMELOG) corrective regimen sliding scale   SubCutaneous three times a day before meals  insulin lispro (ADMELOG) corrective regimen sliding scale   SubCutaneous at bedtime  levothyroxine 25 MICROGram(s) Oral daily  losartan 100 milliGRAM(s) Oral daily  metolazone 2.5 milliGRAM(s) Oral <User Schedule>  metoprolol succinate ER 25 milliGRAM(s) Oral daily  montelukast 10 milliGRAM(s) Oral daily  pantoprazole    Tablet 40 milliGRAM(s) Oral before breakfast  potassium chloride  10 mEq/100 mL IVPB 10 milliEquivalent(s) IV Intermittent every 1 hour  tamsulosin 0.8 milliGRAM(s) Oral at bedtime    MEDICATIONS  (PRN):  acetaminophen   Tablet .. 650 milliGRAM(s) Oral every 6 hours PRN Temp greater or equal to 38C (100.4F), Mild Pain (1 - 3)  aluminum hydroxide/magnesium hydroxide/simethicone Suspension 30 milliLiter(s) Oral every 4 hours PRN Dyspepsia  melatonin 3 milliGRAM(s) Oral at bedtime PRN Insomnia  ondansetron Injectable 4 milliGRAM(s) IV Push every 8 hours PRN Nausea and/or Vomiting        Vital Signs Last 24 Hrs  T(C): 36.3 (03 Sep 2021 04:08), Max: 36.8 (03 Sep 2021 00:28)  T(F): 97.4 (03 Sep 2021 04:08), Max: 98.2 (03 Sep 2021 00:28)  HR: 84 (03 Sep 2021 04:08) (81 - 88)  BP: 129/73 (03 Sep 2021 04:08) (124/60 - 141/56)  BP(mean): --  RR: 18 (03 Sep 2021 04:08) (18 - 18)  SpO2: 99% (03 Sep 2021 04:08) (95% - 99%)    PHYSICAL EXAM:Pleasant patient in no acute distress.      Constitutional:Comfortable.Awake and alert  No cachexia     Eyes:PERRL EOMI.NO discharge or conjunctival injection    ENMT:No sinus tenderness.No thrush.No pharyngeal exudate or erythema.Fair dental hygiene    scab on right side of nose     Neck:Supple,No LN,no JVD      Respiratory:Good air entry bilaterally,CTA    Cardiovascular:S1 S2     Gastrointestinal:Soft BS(+) no tenderness  umbilical hernia    Extremities: bilateral edema, onychomycosis    Vascular:peripheral pulses felt    Neurological:AAO X 3,No grossly focal deficits    Skin: dry scaley erythematous skin, onychomycoiss, right fourth digit with ulcer   all toes are erythematous     Lymph Nodes:No palpable LNs    Musculoskeletal:No joint swelling or LOM    Psychiatric:Affect normal.                                10.3   6.96  )-----------( 203      ( 03 Sep 2021 11:00 )             33.1     LIVER FUNCTIONS - ( 02 Sep 2021 16:41 )  Alb: 3.9 g/dL / Pro: 6.9 g/dL / ALK PHOS: 73 U/L / ALT: 10 U/L / AST: 15 U/L / GGT: x                 145  |  102  |  49<H>  ----------------------------<  93  2.9<LL>   |  29  |  1.44<H>    Ca    9.5      03 Sep 2021 11:00  Phos  2.8       Mg     1.8         TPro  6.9  /  Alb  3.9  /  TBili  0.3  /  DBili  x   /  AST  15  /  ALT  10  /  AlkPhos  73          COVID-19 PCR (21 @ 16:42)    COVID-19 PCR: NotDetec: You can help in the fight against COVID-19. [facility] may contact  you to see if you are interested in voluntarily participating in one of  our clinical trials.  Testing is performed using polymerase chain reaction (PCR) or  transcription mediated amplification (TMA). This COVID-19 (SARS-CoV-2)  nucleic acid amplification test was validated by [facility] and is  in use under the FDA Emergency Use Authorization (EUA) for clinical labs  CLIA-certified to perform high complexity testing. Test results should be  correlated with clinical presentation, patient history, and epidemiology.      < from: Xray Foot AP + Lateral + Oblique, Right (21 @ 17:31) >  EXAM:  XR FOOT COMP MIN 3 VIEWS RT                            PROCEDURE DATE:  2021            INTERPRETATION:  CLINICAL INDICATION: Toe pain. Right second toe ischemic appearing with blister formation. Right third toe with gangrenous changesat the tip. Right fourth toe superficial ulceration. Rule out underlying osteomyelitis.    TECHNIQUE:  X-ray right foot 3 views.    COMPARISON: No previous studies available for comparison.    FINDINGS:  No acute displaced fracture or dislocation.  No subcutaneous gas or cortical bone erosion to suggest osteomyelitis.  Generalized osteopenia.  Joint spaces are maintained.  Superficial right second toe distal soft tissue defect.    IMPRESSION:  Generalized osteopenia limits evaluation, however no visualized cortical bone erosion to suggest osteomyelitis.    No acute displaced fracture or dislocation.    --- End of Report ---              ANITA INFANTE MD; Resident Radiologist  This document has been electronically signed.  CAROL HAYWARD MD; Attending Radiologist  This document has been electronically signed. Sep  2 2021  7:20PM    < end of copied text >    < from: US Renal (21 @ 12:31) >  IMPRESSION:    Small bilateral renal cysts. No hydronephrosis.    < end of copied text >                       Patient is a 86y old  Male who presents with a chief complaint of R toe pain (03 Sep 2021 12:08)      HPI:  86M with PMH of HTN, HLD, Afib on coumadin, CHF, T2DM on insulin, COPD, CVA, BPH p/w R toe pain and ulcer. Pt states he has been having weeks of R great toe pain with discoloration - pain is worse at night, severe, disturbs his sleep, located only along R great toe without radiation; has noted all his toes have been more red during the same time, but denies any pain in his other toes or L foot. Pain is well controlled during the day with tylenol and pt is still able to ambulate as normal. A couple of week ago, pt wife stepped on his toe which led to a blister on his 4th R toe; went to podiatry who lanced the blister but it has not been healing well despite local wound care; denies any significant pain or drainage. Pt endorses intact sensation in b/l LE. Went to see vascular Dr Moy in the office and was referred to ED for c/f PAD and possible gangrene.     Denies any associated fevers, chills, nausea/vomiting, LE swelling outside his baseline, no calf pain, no other skin changes. Rest of ROS negative.  (03 Sep 2021 02:08)      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    Chronic Obstructive Pulmonary Disease (COPD)    Obstructive Sleep Apnea    Mycobacterium Avium-Intracellulare Infection  2009    Deep Vein Thrombosis (DVT)  17 yeras ago on Coumadin    CHF (congestive heart failure)  last exacerbation in 2017    Enlarged prostate    GERD (gastroesophageal reflux disease)    Hernia  umblical    Calculus of bile duct without cholangitis or cholecystitis without obstruction    Atrial fibrillation    S/P Hernia Repair    S/P cataract surgery, unspecified laterality    S/P ERCP  3/2017        Social history:   Marital Status:   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 Fevers absent    Skin: nonhealing ulcer right nares   	  Ophthalmologic:Denies any visual complaints,discharge redness or photophobia  	  ENMT:No nasal discharge,headache,sinus congestion or throat pain.No dental complaints    Respiratory and Thorax:No cough,sputum or chest pain.Denies shortness of breath  	  Cardiovascular:	No chest pain,palpitaions or dizziness    Gastrointestinal:	NO nausea,abdominal pain or diarrhea.    Genitourinary:	No dysuria,frequency. No flank pain    Musculoskeletal:	RLE weakness pain in toe    Neurological:No confusion,diziness.No extremity weakness.No bladder or bowel incontinence	    Psychiatric:No delusions or hallucinations	    Hematology/Lymphatics:	No LN swelling.No gum bleeding     Endocrine:	No recent weight gain or loss.No abnormal heat/cold intolerance    Allergic/Immunologic:	No hives or rash     Allergies    No Known Allergies    Intolerances        Antimicrobials:       MEDICATIONS  (prior antimicrobials ):                 MEDICATIONS  (STANDING):  aspirin  chewable 81 milliGRAM(s) Oral daily  atorvastatin 40 milliGRAM(s) Oral at bedtime  buDESOnide    Inhalation Suspension 0.5 milliGRAM(s) Inhalation two times a day  dextrose 40% Gel 15 Gram(s) Oral once  dextrose 5%. 1000 milliLiter(s) (50 mL/Hr) IV Continuous <Continuous>  dextrose 5%. 1000 milliLiter(s) (100 mL/Hr) IV Continuous <Continuous>  dextrose 50% Injectable 25 Gram(s) IV Push once  dextrose 50% Injectable 12.5 Gram(s) IV Push once  dextrose 50% Injectable 25 Gram(s) IV Push once  finasteride 5 milliGRAM(s) Oral daily  furosemide    Tablet 20 milliGRAM(s) Oral two times a day  glucagon  Injectable 1 milliGRAM(s) IntraMuscular once  insulin glargine Injectable (LANTUS) 25 Unit(s) SubCutaneous at bedtime  insulin lispro (ADMELOG) corrective regimen sliding scale   SubCutaneous three times a day before meals  insulin lispro (ADMELOG) corrective regimen sliding scale   SubCutaneous at bedtime  levothyroxine 25 MICROGram(s) Oral daily  losartan 100 milliGRAM(s) Oral daily  metolazone 2.5 milliGRAM(s) Oral <User Schedule>  metoprolol succinate ER 25 milliGRAM(s) Oral daily  montelukast 10 milliGRAM(s) Oral daily  pantoprazole    Tablet 40 milliGRAM(s) Oral before breakfast  potassium chloride  10 mEq/100 mL IVPB 10 milliEquivalent(s) IV Intermittent every 1 hour  tamsulosin 0.8 milliGRAM(s) Oral at bedtime    MEDICATIONS  (PRN):  acetaminophen   Tablet .. 650 milliGRAM(s) Oral every 6 hours PRN Temp greater or equal to 38C (100.4F), Mild Pain (1 - 3)  aluminum hydroxide/magnesium hydroxide/simethicone Suspension 30 milliLiter(s) Oral every 4 hours PRN Dyspepsia  melatonin 3 milliGRAM(s) Oral at bedtime PRN Insomnia  ondansetron Injectable 4 milliGRAM(s) IV Push every 8 hours PRN Nausea and/or Vomiting        Vital Signs Last 24 Hrs  T(C): 36.3 (03 Sep 2021 04:08), Max: 36.8 (03 Sep 2021 00:28)  T(F): 97.4 (03 Sep 2021 04:08), Max: 98.2 (03 Sep 2021 00:28)  HR: 84 (03 Sep 2021 04:08) (81 - 88)  BP: 129/73 (03 Sep 2021 04:08) (124/60 - 141/56)  BP(mean): --  RR: 18 (03 Sep 2021 04:08) (18 - 18)  SpO2: 99% (03 Sep 2021 04:08) (95% - 99%)    PHYSICAL EXAM:Pleasant patient in no acute distress.      Constitutional:Comfortable.Awake and alert  No cachexia     Eyes:PERRL EOMI.NO discharge or conjunctival injection    ENMT:No sinus tenderness.No thrush.No pharyngeal exudate or erythema.Fair dental hygiene    scab on right side of nose     Neck:Supple,No LN,no JVD      Respiratory:Good air entry bilaterally,CTA    Cardiovascular:S1 S2     Gastrointestinal:Soft BS(+) no tenderness  umbilical hernia    Extremities: bilateral edema, onychomycosis    Vascular:peripheral pulses felt    Neurological:AAO X 3,No grossly focal deficits    Skin: dry scaley erythematous skin, onychomycoiss, right fourth digit with ulcer   all toes are erythematous     Lymph Nodes:No palpable LNs    Musculoskeletal:No joint swelling or LOM    Psychiatric:Affect normal.                                10.3   6.96  )-----------( 203      ( 03 Sep 2021 11:00 )             33.1     LIVER FUNCTIONS - ( 02 Sep 2021 16:41 )  Alb: 3.9 g/dL / Pro: 6.9 g/dL / ALK PHOS: 73 U/L / ALT: 10 U/L / AST: 15 U/L / GGT: x                 145  |  102  |  49<H>  ----------------------------<  93  2.9<LL>   |  29  |  1.44<H>    Ca    9.5      03 Sep 2021 11:00  Phos  2.8       Mg     1.8         TPro  6.9  /  Alb  3.9  /  TBili  0.3  /  DBili  x   /  AST  15  /  ALT  10  /  AlkPhos  73          COVID-19 PCR (21 @ 16:42)    COVID-19 PCR: NotDetec: You can help in the fight against COVID-19. [facility] may contact  you to see if you are interested in voluntarily participating in one of  our clinical trials.  Testing is performed using polymerase chain reaction (PCR) or  transcription mediated amplification (TMA). This COVID-19 (SARS-CoV-2)  nucleic acid amplification test was validated by [facility] and is  in use under the FDA Emergency Use Authorization (EUA) for clinical labs  CLIA-certified to perform high complexity testing. Test results should be  correlated with clinical presentation, patient history, and epidemiology.      < from: Xray Foot AP + Lateral + Oblique, Right (21 @ 17:31) >  EXAM:  XR FOOT COMP MIN 3 VIEWS RT                            PROCEDURE DATE:  2021            INTERPRETATION:  CLINICAL INDICATION: Toe pain. Right second toe ischemic appearing with blister formation. Right third toe with gangrenous changesat the tip. Right fourth toe superficial ulceration. Rule out underlying osteomyelitis.    TECHNIQUE:  X-ray right foot 3 views.    COMPARISON: No previous studies available for comparison.    FINDINGS:  No acute displaced fracture or dislocation.  No subcutaneous gas or cortical bone erosion to suggest osteomyelitis.  Generalized osteopenia.  Joint spaces are maintained.  Superficial right second toe distal soft tissue defect.    IMPRESSION:  Generalized osteopenia limits evaluation, however no visualized cortical bone erosion to suggest osteomyelitis.    No acute displaced fracture or dislocation.    --- End of Report ---              ANITA INFANTE MD; Resident Radiologist  This document has been electronically signed.  CAROL HAYWARD MD; Attending Radiologist  This document has been electronically signed. Sep  2 2021  7:20PM    < end of copied text >    < from: US Renal (21 @ 12:31) >  IMPRESSION:    Small bilateral renal cysts. No hydronephrosis.    < end of copied text >

## 2021-09-03 NOTE — PROGRESS NOTE ADULT - SUBJECTIVE AND OBJECTIVE BOX
Podiatry pager #: [phone] ([place])/ [phone] ([facility])    Patient is a 86y old  Male who presents with a chief complaint of R toe pain (03 Sep 2021 02:08)       INTERVAL HPI/OVERNIGHT EVENTS:  Patient seen and evaluated at bedside.  Pt is resting comfortable in NAD. Denies N/V/F/C.     Allergies    No Known Allergies    Intolerances        Vital Signs Last 24 Hrs  T(C): 36.3 (03 Sep 2021 04:08), Max: 36.8 (03 Sep 2021 00:28)  T(F): 97.4 (03 Sep 2021 04:08), Max: 98.2 (03 Sep 2021 00:28)  HR: 84 (03 Sep 2021 04:08) (81 - 88)  BP: 129/73 (03 Sep 2021 04:08) (124/60 - 141/56)  BP(mean): --  RR: 18 (03 Sep 2021 04:08) (18 - 18)  SpO2: 99% (03 Sep 2021 04:08) (95% - 99%)    LABS:                        10.3   6.96  )-----------( 203      ( 03 Sep 2021 11:00 )             33.1     09-02    142  |  99  |  60<H>  ----------------------------<  152<H>  3.5   |  25  |  1.81<H>    Ca    9.5      02 Sep 2021 16:41    TPro  6.9  /  Alb  3.9  /  TBili  0.3  /  DBili  x   /  AST  15  /  ALT  10  /  AlkPhos  73  09-02    PT/INR - ( 03 Sep 2021 11:00 )   PT: 40.5 sec;   INR: 3.59 ratio         PTT - ( 03 Sep 2021 11:00 )  PTT:48.3 sec    CAPILLARY BLOOD GLUCOSE      POCT Blood Glucose.: 97 mg/dL (03 Sep 2021 08:37)  POCT Blood Glucose.: 103 mg/dL (03 Sep 2021 02:09)      Lower Extremity Physical Exam:    Vascular: DP/PT NP, B/L, CFT >3s seconds B/L, Temperature gradient B/L forefoot cool to touch   Neuro: Epicritic sensation intact to the level of digits B/L.  Musculoskeletal/Ortho: unremarkable    Skin: R forefoot cool to touch, w/ dependent rubor, 3rd digit dorsal erythema w/ no fluctuance, no acute signs of infection. L forefoot cool to touch w/ dependent rubor, small dry eschars at distal digits 1, 2, 3. B/L posterior ankle eschars, dry and stable.  Edema B/L lower extremities.    RADIOLOGY & ADDITIONAL TESTS:

## 2021-09-03 NOTE — H&P ADULT - NSHPLABSRESULTS_GEN_ALL_CORE
Labs, imaging and EKG personally reviewed and interpreted by me.   labs notable for normal WBC, baseline Hb, BUN/cr 60/1.8 (baseline 1.5-1.8), normal LFTs.   XR R foot personally reviewed and interpreted - no fractures noted.   EKG                         10.3   9.84  )-----------( 209      ( 02 Sep 2021 16:41 )             33.2     09-02    142  |  99  |  60<H>  ----------------------------<  152<H>  3.5   |  25  |  1.81<H>    Ca    9.5      02 Sep 2021 16:41    TPro  6.9  /  Alb  3.9  /  TBili  0.3  /  DBili  x   /  AST  15  /  ALT  10  /  AlkPhos  73  09-02        PT/INR - ( 02 Sep 2021 16:41 )   PT: 33.4 sec;   INR: 2.93 ratio         PTT - ( 02 Sep 2021 16:41 )  PTT:49.2 sec      < from: Xray Foot AP + Lateral + Oblique, Right (09.02.21 @ 17:31) >    IMPRESSION:  Generalized osteopenia limits evaluation, however no visualized cortical bone erosion to suggest osteomyelitis.    No acute displaced fracture or dislocation.    < end of copied text >

## 2021-09-03 NOTE — DISCHARGE NOTE PROVIDER - NSDCMRMEDTOKEN_GEN_ALL_CORE_FT
aspirin 81 mg oral tablet, chewable: 1 tab(s) orally once a day  budesonide 0.5 mg/2 mL inhalation suspension: 0.5 milligram(s) inhaled 2 times a day  Centrum Silver oral tablet: 1 tab(s) orally once a day  Coumadin 5 mg oral tablet: 1 tab(s) orally once a day  Crestor 10 mg oral tablet: 1 tab(s) orally once a day (at bedtime)  finasteride 5 mg oral tablet: 1 tab(s) orally once a day  furosemide 20 mg oral tablet: 1 tab(s) orally 2 times a day    glipiZIDE 10 mg oral tablet: 1 tab(s) orally 2 times a day  insulin glargine: 30 unit(s) subcutaneous once a day (at bedtime)  metformin 500 mg oral tablet: 1 tab(s) orally 2 times a day  metOLazone 2.5 mg oral tablet: 1 tab(s) orally 3 times a week  metoprolol succinate 25 mg oral capsule, extended release: 1 cap(s) orally once a day  montelukast 10 mg oral tablet: 1 tab(s) orally once a day  omeprazole 40 mg oral delayed release capsule: 1 cap(s) orally once a day  Synthroid 25 mcg (0.025 mg) oral tablet: 1 tab(s) orally once a day  tamsulosin 0.4 mg oral capsule: 1 cap(s) orally 2 times a day  telmisartan 80 mg oral tablet: 1 tab(s) orally once a day   acetaminophen 325 mg oral tablet: 2 tab(s) orally every 6 hours, As needed, Mild Pain (1 - 3)  aspirin 81 mg oral tablet, chewable: 1 tab(s) orally once a day  budesonide 0.5 mg/2 mL inhalation suspension: 0.5 milligram(s) inhaled 2 times a day  Centrum Silver oral tablet: 1 tab(s) orally once a day  Coumadin 5 mg oral tablet: 1 tab(s) orally once a day (at bedtime)  Crestor 10 mg oral tablet: 1 tab(s) orally once a day (at bedtime)  finasteride 5 mg oral tablet: 1 tab(s) orally once a day  furosemide 20 mg oral tablet: 1 tab(s) orally 2 times a day    glipiZIDE 10 mg oral tablet: 1 tab(s) orally 2 times a day  insulin glargine: 22 unit(s) subcutaneous once a day (at bedtime)  metformin 500 mg oral tablet: 1 tab(s) orally 2 times a day  metOLazone 2.5 mg oral tablet: 1 tab(s) orally 3 times a week  metoprolol succinate 25 mg oral capsule, extended release: 1 cap(s) orally once a day  montelukast 10 mg oral tablet: 1 tab(s) orally once a day  mupirocin 2% topical ointment: 1 application topically 2 times a day  omeprazole 40 mg oral delayed release capsule: 1 cap(s) orally once a day  Synthroid 25 mcg (0.025 mg) oral tablet: 1 tab(s) orally once a day  tamsulosin 0.4 mg oral capsule: 2 cap(s) orally once a day (at bedtime)  telmisartan 80 mg oral tablet: 1 tab(s) orally once a day

## 2021-09-03 NOTE — DISCHARGE NOTE PROVIDER - NSDCFUADDAPPT_GEN_ALL_CORE_FT
APPTS ARE READY TO BE MADE: [x ] YES    Best Family or Patient Contact (if needed):    Additional Information about above appointments (if needed):    1: Podiatry follow up   2: Vascular follow up outpatient for further intervention  3:     Other comments or requests:    APPTS ARE READY TO BE MADE: [x ] YES    Best Family or Patient Contact (if needed):    Additional Information about above appointments (if needed):    1: Podiatry follow up   2: Vascular follow up outpatient for further intervention  3:         Other comments or requests:   Patient has own provider	  	Patient  was advised they currently have a specialist that they will follow up with. Dr [email or web address]-[phone])(Joseph [phone])

## 2021-09-03 NOTE — H&P ADULT - NSHPREVIEWOFSYSTEMS_GEN_ALL_CORE
REVIEW OF SYSTEMS:    CONSTITUTIONAL: No weakness, fevers, chills  EYES/ENT: No visual changes;  no throat pain   NECK: No pain or stiffness  RESPIRATORY: No cough, no shortness of breath  CARDIOVASCULAR: No chest pain or palpitations  GASTROINTESTINAL: no nausea, vomiting, no abdominal pain, no BRBPR  GENITOURINARY: no polyuria, no dysuria  NEUROLOGICAL: no numbness, no headaches, no confusion   MUSCULOSKELETAL: no back pain,  +R toe pain  SKIN: toe discoloration, no rash   PSYCH: no anxiety, depression  HEME: no gum bleeding, no bruising

## 2021-09-03 NOTE — DISCHARGE NOTE PROVIDER - PROVIDER TOKENS
PROVIDER:[TOKEN:[[id number]]] PROVIDER:[TOKEN:[[id number]],FOLLOWUP:[2 weeks]],PROVIDER:[TOKEN:[[id number]],FOLLOWUP:[1 week]]

## 2021-09-03 NOTE — DISCHARGE NOTE PROVIDER - NSDCFUSCHEDAPPT_GEN_ALL_CORE_FT
LIBRA PEÑA ; 09/10/2021 ; NPP Cardio [facility]  LIBRA PEÑA ; 09/14/2021 ; NPP Cardio Electro [address] LIBRA Sands ; 09/17/2021 ; NPP Surg Vasc [address] LIBRA Trejo ; 11/05/2021 ; NPP Rad  Opd Lkvl  LIBRA PEÑA ; 11/05/2021 ; NPP Urology [address] [facility] LIBRA PEÑA ; 09/14/2021 ; NPP Cardio Electro [address] LIBRA Sands ; 11/05/2021 ; NPP Rad  Opd Lkvl  LIBRA PEÑA ; 11/05/2021 ; NPP Urology [facility] LIBRA PEÑA ; 11/05/2021 ; NPP Rad  Opd Lkvl  LIBRA PEÑA ; 11/05/2021 ; NPP Urology [facility]

## 2021-09-03 NOTE — PROGRESS NOTE ADULT - ASSESSMENT
85yo M w/ early ischemic changes B/L forefoot  -pt was seen and examined  -Afebrile, no leukocytosis  - R forefoot cool to touch, w/ dependent rubor, 3rd digit dorsal erythema w/ no fluctuance, no acute signs of infection. L forefoot cool to touch w/ dependent rubor, small dry eschars at distal digits 1, 2, 3. B/L posterior ankle eschars, dry and stable.  Edema B/L lower extremities.  -No acute podiatric intervention at this time, pod stable for discharge  -Will defer to vascular surgery for further management and disposition  -Can follow up w/ Dr. Lam at [phone]  -Seen w/ attending

## 2021-09-03 NOTE — CONSULT NOTE ADULT - ASSESSMENT
86M with PMH of HTN, HLD, Afib on coumadin, CHF, T2DM on insulin, COPD, CVA, BPH p/w R toe pain and ulcer. Pt states he has been having weeks of R great toe pain with discoloration - pain is worse at night, severe, disturbs his sleep, located only along R great toe without radiation; has noted all his toes have been more red during the same time, but denies any pain in his other toes or L foot. Pain is well controlled during the day with tylenol and pt is still able to ambulate as normal. A couple of week ago, pt wife stepped on his toe which led to a blister on his 4th R toe; went to podiatry who lanced the blister but it has not been healing well despite local wound care; denies any significant pain or drainage. Pt endorses intact sensation in b/l LE. Went to see vascular Dr Moy in the office and was referred to ED for c/f PAD and possible gangrene.     Denies any associated fevers, chills, nausea/vomiting, LE swelling outside his baseline, no calf pain, no other skin changes. Rest of ROS negative.     A/P  #foot ulcer  DIscussed case with podiatry attending  no evidence of gangrene  yes- dependent rubor and superficial ulcer in the right fourth toe  no evidence of cellulitis  dry scaley skin  no fever and no elevated WBC  for now, will just use topical bactroban but will start systemic treatment if change in status    #PVD  check arterial dopplers    #LE edema  check venous dopplers  check echo    #onychomycosis  podiatry to comment    #NONHEALING ulcer right NARES  suggest dermatology input for right nares- has been there for months and for dry scaley skin in general    # right toe pain  from ulcer  feet are sensitive in general- likely from scaley cracked skin but would also check a uric acid    Sasha Macias M.D. ,   Pager [phone]     after 5PM/ weekends [phone]    Assessment and plan discussed with the primary team .    I will be away as of tomorrow. My associates will be covering. Please call [phone] with acute issues, questions.

## 2021-09-03 NOTE — PROGRESS NOTE ADULT - SUBJECTIVE AND OBJECTIVE BOX
SUBJECTIVE: No acute overnight events. Patient seen and examined at bedside by vascular team. Patient denies pain, nausea or vomiting.     Objective:    Vital Signs Last 24 Hrs  T(C): 36.7 (03 Sep 2021 13:02), Max: 36.8 (03 Sep 2021 00:28)  T(F): 98 (03 Sep 2021 13:02), Max: 98.2 (03 Sep 2021 00:28)  HR: 76 (03 Sep 2021 13:02) (76 - 88)  BP: 120/63 (03 Sep 2021 13:02) (120/63 - 141/56)  BP(mean): --  RR: 18 (03 Sep 2021 13:02) (18 - 18)  SpO2: 99% (03 Sep 2021 13:02) (95% - 99%)    I&O's Detail    02 Sep 2021 07:01  -  03 Sep 2021 07:00  --------------------------------------------------------  IN:    Oral Fluid: 250 mL  Total IN: 250 mL    OUT:    Voided (mL): 250 mL  Total OUT: 250 mL    Total NET: 0 mL      03 Sep 2021 07:01  -  03 Sep 2021 14:51  --------------------------------------------------------  IN:    Oral Fluid: 480 mL  Total IN: 480 mL    OUT:    Voided (mL): 200 mL  Total OUT: 200 mL    Total NET: 280 mL      PHYSICAL EXAM:    GENERAL: NAD, well-developed  CHEST/LUNG: Unlabored breathing.   EXTREMITIES: b/l LE edema, dependent rubor in b/l toes, pain on palpation of R great toe, clean ulcer on R 4th toe no signs of infection.            LABS:                        10.3   6.96  )-----------( 203      ( 03 Sep 2021 11:00 )             33.1     09-03    145  |  102  |  49<H>  ----------------------------<  93  2.9<LL>   |  29  |  1.44<H>    Ca    9.5      03 Sep 2021 11:00  Phos  2.8     09-03  Mg     1.8     09-03    TPro  6.9  /  Alb  3.9  /  TBili  0.3  /  DBili  x   /  AST  15  /  ALT  10  /  AlkPhos  73  09-02    PT/INR - ( 03 Sep 2021 11:00 )   PT: 40.5 sec;   INR: 3.59 ratio         PTT - ( 03 Sep 2021 11:00 )  PTT:48.3 sec        MEDS  acetaminophen   Tablet .. 650 milliGRAM(s) Oral every 6 hours PRN  aluminum hydroxide/magnesium hydroxide/simethicone Suspension 30 milliLiter(s) Oral every 4 hours PRN  aspirin  chewable 81 milliGRAM(s) Oral daily  atorvastatin 40 milliGRAM(s) Oral at bedtime  buDESOnide    Inhalation Suspension 0.5 milliGRAM(s) Inhalation two times a day  dextrose 40% Gel 15 Gram(s) Oral once  dextrose 5%. 1000 milliLiter(s) IV Continuous <Continuous>  dextrose 5%. 1000 milliLiter(s) IV Continuous <Continuous>  dextrose 50% Injectable 25 Gram(s) IV Push once  dextrose 50% Injectable 12.5 Gram(s) IV Push once  dextrose 50% Injectable 25 Gram(s) IV Push once  finasteride 5 milliGRAM(s) Oral daily  furosemide    Tablet 20 milliGRAM(s) Oral two times a day  glucagon  Injectable 1 milliGRAM(s) IntraMuscular once  insulin glargine Injectable (LANTUS) 25 Unit(s) SubCutaneous at bedtime  insulin lispro (ADMELOG) corrective regimen sliding scale   SubCutaneous three times a day before meals  insulin lispro (ADMELOG) corrective regimen sliding scale   SubCutaneous at bedtime  levothyroxine 25 MICROGram(s) Oral daily  losartan 100 milliGRAM(s) Oral daily  melatonin 3 milliGRAM(s) Oral at bedtime PRN  metolazone 2.5 milliGRAM(s) Oral <User Schedule>  metoprolol succinate ER 25 milliGRAM(s) Oral daily  montelukast 10 milliGRAM(s) Oral daily  mupirocin 2% Ointment 1 Application(s) Topical two times a day  ondansetron Injectable 4 milliGRAM(s) IV Push every 8 hours PRN  pantoprazole    Tablet 40 milliGRAM(s) Oral before breakfast  potassium chloride  10 mEq/100 mL IVPB 10 milliEquivalent(s) IV Intermittent every 1 hour  tamsulosin 0.8 milliGRAM(s) Oral at bedtime

## 2021-09-03 NOTE — H&P ADULT - PROBLEM SELECTOR PLAN 5
check A1c  hold oral home medications  on lantus 30units at home - dose reduce to 25units, titrate as needed  low sliding scale and monitor FS ac and hs

## 2021-09-03 NOTE — DISCHARGE NOTE PROVIDER - CARE PROVIDER_API CALL
Palomo Lam (DPM)  Podiatric Medicine and Surgery  [address] [place] [address]  Phone: [phone]  Fax: [phone]  Follow Up Time:    Palomo Lam (DPM)  Podiatric Medicine and Surgery  [address] [place] [address]  Phone: [phone]  Fax: [phone]  Follow Up Time: 2 weeks    Anmol Quinn)  Surgery  Vascular  [address] [place] [address]  Phone: [phone]  Fax: [phone]  Follow Up Time: 1 week

## 2021-09-03 NOTE — DISCHARGE NOTE PROVIDER - NSDCCPCAREPLAN_GEN_ALL_CORE_FT
Lab PRINCIPAL DISCHARGE DIAGNOSIS  Diagnosis: Toe pain, right  Assessment and Plan of Treatment: R 3rd toe ulcer, clean, no purulence, no e/o overlying cellulitis, no acute infection, no podiatric interventions planned   Wound care: please apply mupirocin to right 3rd toe followed by 4x4 guaze and my daily.      SECONDARY DISCHARGE DIAGNOSES  Diagnosis: Peripheral artery disease  Assessment and Plan of Treatment:     Diagnosis: Chronic atrial fibrillation  Assessment and Plan of Treatment: Atrial fibrillation is the most common heart rhythm problem.  The condition puts you at risk for has stroke and heart attack  It helps if you control your blood pressure, not drink more than 1-2 alcohol drinks per day, cut down on caffeine, getting treatment for over active thyroid gland, and get regular exercise  Call your doctor if you feel your heart racing or beating unusually, chest tightness or pain, lightheaded, faint, shortness of breath especially with exercise  It is important to take your heart medication as prescribed  You may be on anticoagulation which is very important to take as directed - you may need blood work to monitor drug levels     PRINCIPAL DISCHARGE DIAGNOSIS  Diagnosis: Toe pain, right  Assessment and Plan of Treatment: R 3rd toe ulcer, clean, no purulence, no e/o overlying cellulitis, no acute infection, no podiatric interventions planned   Wound care: please apply mupirocin to right 3rd toe followed by 4x4 guaze and my daily.      SECONDARY DISCHARGE DIAGNOSES  Diagnosis: Peripheral artery disease  Assessment and Plan of Treatment: Follow up with Dr. Quinn outpatient for further intervention.    Diagnosis: Chronic atrial fibrillation  Assessment and Plan of Treatment: Atrial fibrillation is the most common heart rhythm problem.  The condition puts you at risk for has stroke and heart attack  It helps if you control your blood pressure, not drink more than 1-2 alcohol drinks per day, cut down on caffeine, getting treatment for over active thyroid gland, and get regular exercise  Call your doctor if you feel your heart racing or beating unusually, chest tightness or pain, lightheaded, faint, shortness of breath especially with exercise  It is important to take your heart medication as prescribed  You may be on anticoagulation which is very important to take as directed - you may need blood work to monitor drug levels

## 2021-09-03 NOTE — H&P ADULT - HISTORY OF PRESENT ILLNESS
86M with PMH of HTN, HLD, Afib on coumadin, CHF, T2DM on insulin, COPD, CVA, BPH p/w R toe pain and ulcer. Pt states he has been having weeks of R great toe pain with discoloration - pain is worse at night, severe, disturbs his sleep, located only along R great toe without radiation; has noted all his toes have been more red during the same time, but denies any pain in his other toes or L foot. Pain is well controlled during the day with tylenol and pt is still able to ambulate as normal. A couple of week ago, pt wife stepped on his toe which led to a blister on his 4th R toe; went to podiatry who lanced the blister but it has not been healing well despite local wound care; denies any significant pain or drainage. Pt endorses intact sensation in b/l LE. Went to see vascular Dr Moy in the office and was referred to ED for c/f PAD and possible gangrene.   Denies any associated fevers, chills, nausea/vomiting, LE swelling outside his baseline, no calf pain, no other skin changes. Rest of ROS negative.

## 2021-09-04 LAB
-  STAPHYLOCOCCUS EPIDERMIDIS, METHICILLIN RESISTANT: SIGNIFICANT CHANGE UP
ANION GAP SERPL CALC-SCNC: 10 MMOL/L — SIGNIFICANT CHANGE UP (ref 5–17)
BUN SERPL-MCNC: 45 MG/DL — HIGH (ref 7–23)
CALCIUM SERPL-MCNC: 9.8 MG/DL — SIGNIFICANT CHANGE UP (ref 8.4–10.5)
CHLORIDE SERPL-SCNC: 104 MMOL/L — SIGNIFICANT CHANGE UP (ref 96–108)
CO2 SERPL-SCNC: 30 MMOL/L — SIGNIFICANT CHANGE UP (ref 22–31)
CREAT SERPL-MCNC: 1.51 MG/DL — HIGH (ref 0.5–1.3)
CULTURE RESULTS: SIGNIFICANT CHANGE UP
ERYTHROCYTE [SEDIMENTATION RATE] IN BLOOD: 80 MM/HR — HIGH (ref 0–20)
GLUCOSE BLDC GLUCOMTR-MCNC: 137 MG/DL — HIGH (ref 70–99)
GLUCOSE BLDC GLUCOMTR-MCNC: 174 MG/DL — HIGH (ref 70–99)
GLUCOSE BLDC GLUCOMTR-MCNC: 209 MG/DL — HIGH (ref 70–99)
GLUCOSE BLDC GLUCOMTR-MCNC: 258 MG/DL — HIGH (ref 70–99)
GLUCOSE SERPL-MCNC: 122 MG/DL — HIGH (ref 70–99)
GRAM STN FLD: SIGNIFICANT CHANGE UP
HCT VFR BLD CALC: 32.9 % — LOW (ref 39–50)
HGB BLD-MCNC: 10.2 G/DL — LOW (ref 13–17)
INR BLD: 2.15 RATIO — HIGH (ref 0.88–1.16)
MCHC RBC-ENTMCNC: 27.6 PG — SIGNIFICANT CHANGE UP (ref 27–34)
MCHC RBC-ENTMCNC: 31 GM/DL — LOW (ref 32–36)
MCV RBC AUTO: 89.2 FL — SIGNIFICANT CHANGE UP (ref 80–100)
METHOD TYPE: SIGNIFICANT CHANGE UP
NRBC # BLD: 0 /100 WBCS — SIGNIFICANT CHANGE UP (ref 0–0)
ORGANISM # SPEC MICROSCOPIC CNT: SIGNIFICANT CHANGE UP
ORGANISM # SPEC MICROSCOPIC CNT: SIGNIFICANT CHANGE UP
PLATELET # BLD AUTO: 184 K/UL — SIGNIFICANT CHANGE UP (ref 150–400)
POTASSIUM SERPL-MCNC: 3.9 MMOL/L — SIGNIFICANT CHANGE UP (ref 3.5–5.3)
POTASSIUM SERPL-SCNC: 3.9 MMOL/L — SIGNIFICANT CHANGE UP (ref 3.5–5.3)
PROTHROM AB SERPL-ACNC: 24.9 SEC — HIGH (ref 10.6–13.6)
RBC # BLD: 3.69 M/UL — LOW (ref 4.2–5.8)
RBC # FLD: 15.1 % — HIGH (ref 10.3–14.5)
SODIUM SERPL-SCNC: 144 MMOL/L — SIGNIFICANT CHANGE UP (ref 135–145)
SPECIMEN SOURCE: SIGNIFICANT CHANGE UP
WBC # BLD: 7.6 K/UL — SIGNIFICANT CHANGE UP (ref 3.8–10.5)
WBC # FLD AUTO: 7.6 K/UL — SIGNIFICANT CHANGE UP (ref 3.8–10.5)

## 2021-09-04 RX ORDER — WARFARIN SODIUM 2.5 MG/1
5 TABLET ORAL ONCE
Refills: 0 | Status: COMPLETED | OUTPATIENT
Start: 2021-09-04 | End: 2021-09-04

## 2021-09-04 RX ADMIN — Medication 81 MILLIGRAM(S): at 11:01

## 2021-09-04 RX ADMIN — TAMSULOSIN HYDROCHLORIDE 0.8 MILLIGRAM(S): 0.4 CAPSULE ORAL at 21:29

## 2021-09-04 RX ADMIN — ATORVASTATIN CALCIUM 40 MILLIGRAM(S): 80 TABLET, FILM COATED ORAL at 21:29

## 2021-09-04 RX ADMIN — Medication 650 MILLIGRAM(S): at 21:29

## 2021-09-04 RX ADMIN — Medication 1: at 17:43

## 2021-09-04 RX ADMIN — Medication 2: at 13:18

## 2021-09-04 RX ADMIN — Medication 20 MILLIGRAM(S): at 16:33

## 2021-09-04 RX ADMIN — Medication 1: at 22:26

## 2021-09-04 RX ADMIN — WARFARIN SODIUM 5 MILLIGRAM(S): 2.5 TABLET ORAL at 21:29

## 2021-09-04 RX ADMIN — FINASTERIDE 5 MILLIGRAM(S): 5 TABLET, FILM COATED ORAL at 11:02

## 2021-09-04 RX ADMIN — Medication 0.5 MILLIGRAM(S): at 16:33

## 2021-09-04 RX ADMIN — Medication 0.5 MILLIGRAM(S): at 05:47

## 2021-09-04 RX ADMIN — Medication 650 MILLIGRAM(S): at 22:37

## 2021-09-04 RX ADMIN — PANTOPRAZOLE SODIUM 40 MILLIGRAM(S): 20 TABLET, DELAYED RELEASE ORAL at 05:47

## 2021-09-04 RX ADMIN — Medication 650 MILLIGRAM(S): at 11:01

## 2021-09-04 RX ADMIN — MUPIROCIN 1 APPLICATION(S): 20 OINTMENT TOPICAL at 16:08

## 2021-09-04 RX ADMIN — MONTELUKAST 10 MILLIGRAM(S): 4 TABLET, CHEWABLE ORAL at 11:01

## 2021-09-04 RX ADMIN — Medication 25 MILLIGRAM(S): at 05:47

## 2021-09-04 RX ADMIN — LOSARTAN POTASSIUM 100 MILLIGRAM(S): 100 TABLET, FILM COATED ORAL at 05:47

## 2021-09-04 RX ADMIN — Medication 20 MILLIGRAM(S): at 05:47

## 2021-09-04 RX ADMIN — MUPIROCIN 1 APPLICATION(S): 20 OINTMENT TOPICAL at 05:47

## 2021-09-04 RX ADMIN — Medication 40 MILLIEQUIVALENT(S): at 01:48

## 2021-09-04 RX ADMIN — Medication 25 MICROGRAM(S): at 05:47

## 2021-09-04 RX ADMIN — INSULIN GLARGINE 25 UNIT(S): 100 INJECTION, SOLUTION SUBCUTANEOUS at 22:04

## 2021-09-04 RX ADMIN — Medication 650 MILLIGRAM(S): at 11:40

## 2021-09-04 NOTE — PROGRESS NOTE ADULT - SUBJECTIVE AND OBJECTIVE BOX
Patient is a 86y old  Male who presents with a chief complaint of R toe pain (04 Sep 2021 09:13)      SUBJECTIVE / OVERNIGHT EVENTS:    Events noted.  CONSTITUTIONAL: No fever,  or fatigue  RESPIRATORY: No cough, wheezing,  No shortness of breath  CARDIOVASCULAR: No chest pain, palpitations, dizziness, or leg swelling  GASTROINTESTINAL: No abdominal or epigastric pain.   NEUROLOGICAL: No headaches,     MEDICATIONS  (STANDING):  aspirin  chewable 81 milliGRAM(s) Oral daily  atorvastatin 40 milliGRAM(s) Oral at bedtime  buDESOnide    Inhalation Suspension 0.5 milliGRAM(s) Inhalation two times a day  dextrose 40% Gel 15 Gram(s) Oral once  dextrose 5%. 1000 milliLiter(s) (50 mL/Hr) IV Continuous <Continuous>  dextrose 5%. 1000 milliLiter(s) (100 mL/Hr) IV Continuous <Continuous>  dextrose 50% Injectable 25 Gram(s) IV Push once  dextrose 50% Injectable 12.5 Gram(s) IV Push once  dextrose 50% Injectable 25 Gram(s) IV Push once  finasteride 5 milliGRAM(s) Oral daily  furosemide    Tablet 20 milliGRAM(s) Oral two times a day  glucagon  Injectable 1 milliGRAM(s) IntraMuscular once  insulin glargine Injectable (LANTUS) 25 Unit(s) SubCutaneous at bedtime  insulin lispro (ADMELOG) corrective regimen sliding scale   SubCutaneous three times a day before meals  insulin lispro (ADMELOG) corrective regimen sliding scale   SubCutaneous at bedtime  levothyroxine 25 MICROGram(s) Oral daily  losartan 100 milliGRAM(s) Oral daily  metolazone 2.5 milliGRAM(s) Oral <User Schedule>  metoprolol succinate ER 25 milliGRAM(s) Oral daily  montelukast 10 milliGRAM(s) Oral daily  mupirocin 2% Ointment 1 Application(s) Topical two times a day  pantoprazole    Tablet 40 milliGRAM(s) Oral before breakfast  potassium chloride    Tablet ER 40 milliEquivalent(s) Oral once  tamsulosin 0.8 milliGRAM(s) Oral at bedtime  warfarin 5 milliGRAM(s) Oral once    MEDICATIONS  (PRN):  acetaminophen   Tablet .. 650 milliGRAM(s) Oral every 6 hours PRN Temp greater or equal to 38C (100.4F), Mild Pain (1 - 3)  aluminum hydroxide/magnesium hydroxide/simethicone Suspension 30 milliLiter(s) Oral every 4 hours PRN Dyspepsia  melatonin 3 milliGRAM(s) Oral at bedtime PRN Insomnia  ondansetron Injectable 4 milliGRAM(s) IV Push every 8 hours PRN Nausea and/or Vomiting        CAPILLARY BLOOD GLUCOSE      POCT Blood Glucose.: 124 mg/dL (05 Sep 2021 08:24)  POCT Blood Glucose.: 258 mg/dL (04 Sep 2021 21:55)  POCT Blood Glucose.: 174 mg/dL (04 Sep 2021 17:33)  POCT Blood Glucose.: 209 mg/dL (04 Sep 2021 13:15)    I&O's Summary    04 Sep 2021 07:01  -  05 Sep 2021 07:00  --------------------------------------------------------  IN: 120 mL / OUT: 1650 mL / NET: -1530 mL        T(C): 36.7 (09-05-21 @ 04:43), Max: 36.7 (09-05-21 @ 04:43)  HR: 74 (09-05-21 @ 04:43) (74 - 85)  BP: 135/80 (09-05-21 @ 04:43) (107/84 - 139/69)  RR: 17 (09-05-21 @ 04:43) (17 - 18)  SpO2: 93% (09-05-21 @ 04:43) (93% - 94%)    PHYSICAL EXAM:  GENERAL: NAD  NECK: Supple, No JVD  CHEST/LUNG: Clear to auscultation bilaterally; No wheezing.  HEART: Regular rate and rhythm; No murmurs, rubs, or gallops  ABDOMEN: Soft, Nontender, Nondistended; Bowel sounds present  EXTREMITIES:   No edema  NEUROLOGY: AAO X 3      LABS:                        9.3    7.35  )-----------( 181      ( 05 Sep 2021 07:19 )             29.9     09-05    145  |  105  |  49<H>  ----------------------------<  145<H>  3.4<L>   |  29  |  1.72<H>    Ca    9.8      05 Sep 2021 07:19  Phos  2.8     09-03  Mg     1.8     09-03      PT/INR - ( 05 Sep 2021 07:19 )   PT: 17.5 sec;   INR: 1.49 ratio         PTT - ( 03 Sep 2021 11:00 )  PTT:48.3 sec        CAPILLARY BLOOD GLUCOSE      POCT Blood Glucose.: 124 mg/dL (05 Sep 2021 08:24)  POCT Blood Glucose.: 258 mg/dL (04 Sep 2021 21:55)  POCT Blood Glucose.: 174 mg/dL (04 Sep 2021 17:33)  POCT Blood Glucose.: 209 mg/dL (04 Sep 2021 13:15)        RADIOLOGY & ADDITIONAL TESTS:    Imaging Personally Reviewed:    Consultant(s) Notes Reviewed:      Care Discussed with Consultants/Other Providers:    Graham Pulliam MD, CMD, FACP    [address] [address]  Office Tel: [phone]  Cell: [phone]

## 2021-09-04 NOTE — PHYSICAL THERAPY INITIAL EVALUATION ADULT - ADDITIONAL COMMENTS
patient lives with her  spouse in a apartment on the first floor with 4 steps to enter and no steps to  bedroom and bathroom, patient  needs assistance in ADL's, personal care and  uses a walker for  ambulation. patient lives with her spouse in a apartment on the first floor with 4 steps to enter and no steps to  bedroom and bathroom, patient  needs assistance in ADL's, personal care and  uses a walker for  ambulation. Spouse assisted him as needed.

## 2021-09-04 NOTE — PROGRESS NOTE ADULT - SUBJECTIVE AND OBJECTIVE BOX
SURGERY PROGRESS NOTE    SUBJECTIVE / 24H EVENTS:  Patient seen and examined on morning rounds. No acute events overnight.      OBJECTIVE:  VITAL SIGNS:  T(C): 36.7 (09-04-21 @ 05:02), Max: 36.7 (09-03-21 @ 13:02)  HR: 78 (09-04-21 @ 05:02) (76 - 78)  BP: 127/73 (09-04-21 @ 05:02) (120/63 - 139/68)  RR: 18 (09-04-21 @ 05:02) (18 - 18)  SpO2: 95% (09-04-21 @ 05:02) (95% - 99%)  Daily     Daily   POCT Blood Glucose.: 137 mg/dL (09-04-21 @ 08:59)  POCT Blood Glucose.: 249 mg/dL (09-03-21 @ 21:40)  POCT Blood Glucose.: 283 mg/dL (09-03-21 @ 17:14)      PHYSICAL EXAM:  GENERAL: NAD, well-developed  CHEST/LUNG: Unlabored breathing.   EXTREMITIES: b/l LE edema, dependent rubor in b/l toes, pain on palpation of R great toe, clean ulcer on R 4th toe no signs of infection.        09-03-21 @ 07:01  -  09-04-21 @ 07:00  --------------------------------------------------------  IN:    Oral Fluid: 480 mL  Total IN: 480 mL    OUT:    Voided (mL): 200 mL  Total OUT: 200 mL    Total NET: 280 mL          LAB VALUES:  09-04    144  |  104  |  45<H>  ----------------------------<  122<H>  3.9   |  30  |  1.51<H>    Ca    9.8      04 Sep 2021 07:12  Phos  2.8     09-03  Mg     1.8     09-03    TPro  6.9  /  Alb  3.9  /  TBili  0.3  /  DBili  x   /  AST  15  /  ALT  10  /  AlkPhos  73  09-02                               10.2   7.60  )-----------( 184      ( 04 Sep 2021 07:12 )             32.9     LIVER FUNCTIONS - ( 02 Sep 2021 16:41 )  Alb: 3.9 g/dL / Pro: 6.9 g/dL / ALK PHOS: 73 U/L / ALT: 10 U/L / AST: 15 U/L / GGT: x           PT/INR - ( 03 Sep 2021 11:00 )   PT: 40.5 sec;   INR: 3.59 ratio         PTT - ( 03 Sep 2021 11:00 )  PTT:48.3 sec            MICROBIOLOGY:    Culture - Blood (collected 02 Sep 2021 21:00)  Source: .Blood Blood-Venous  Gram Stain (04 Sep 2021 01:16):    Growth in aerobic bottle: Gram Positive Cocci in Clusters  Preliminary Report (04 Sep 2021 01:17):    Growth in aerobic bottle: Gram Positive Cocci in Clusters    ***Blood Panel PCR results on this specimen are available    approximately 3 hours after the Gram stain result.***    Gram stain, PCR, and/or culture results may not always    correspond due to difference in methodologies.    ************************************************************    This PCR assay was performed by multiplex PCR. This    Assay tests for 66 bacterial and resistance gene targets.    Please refer to the [facility] test directory    at [email or web address] for details.  Organism: Blood Culture PCR (04 Sep 2021 04:17)  Organism: Blood Culture PCR (04 Sep 2021 04:17)    Culture - Blood (collected 02 Sep 2021 19:02)  Source: .Blood Blood-Venous  Preliminary Report (03 Sep 2021 20:01):    No growth to date.        RADIOLOGY:        MEDICATIONS  (STANDING):  aspirin  chewable 81 milliGRAM(s) Oral daily  atorvastatin 40 milliGRAM(s) Oral at bedtime  buDESOnide    Inhalation Suspension 0.5 milliGRAM(s) Inhalation two times a day  dextrose 40% Gel 15 Gram(s) Oral once  dextrose 5%. 1000 milliLiter(s) (50 mL/Hr) IV Continuous <Continuous>  dextrose 5%. 1000 milliLiter(s) (100 mL/Hr) IV Continuous <Continuous>  dextrose 50% Injectable 25 Gram(s) IV Push once  dextrose 50% Injectable 12.5 Gram(s) IV Push once  dextrose 50% Injectable 25 Gram(s) IV Push once  finasteride 5 milliGRAM(s) Oral daily  furosemide    Tablet 20 milliGRAM(s) Oral two times a day  glucagon  Injectable 1 milliGRAM(s) IntraMuscular once  insulin glargine Injectable (LANTUS) 25 Unit(s) SubCutaneous at bedtime  insulin lispro (ADMELOG) corrective regimen sliding scale   SubCutaneous three times a day before meals  insulin lispro (ADMELOG) corrective regimen sliding scale   SubCutaneous at bedtime  levothyroxine 25 MICROGram(s) Oral daily  losartan 100 milliGRAM(s) Oral daily  metolazone 2.5 milliGRAM(s) Oral <User Schedule>  metoprolol succinate ER 25 milliGRAM(s) Oral daily  montelukast 10 milliGRAM(s) Oral daily  mupirocin 2% Ointment 1 Application(s) Topical two times a day  pantoprazole    Tablet 40 milliGRAM(s) Oral before breakfast  tamsulosin 0.8 milliGRAM(s) Oral at bedtime    MEDICATIONS  (PRN):  acetaminophen   Tablet .. 650 milliGRAM(s) Oral every 6 hours PRN Temp greater or equal to 38C (100.4F), Mild Pain (1 - 3)  aluminum hydroxide/magnesium hydroxide/simethicone Suspension 30 milliLiter(s) Oral every 4 hours PRN Dyspepsia  melatonin 3 milliGRAM(s) Oral at bedtime PRN Insomnia  ondansetron Injectable 4 milliGRAM(s) IV Push every 8 hours PRN Nausea and/or Vomiting

## 2021-09-04 NOTE — PROGRESS NOTE ADULT - ASSESSMENT
86M with PMH of HTN, HLD, Afib on coumadin, CHF, T2DM on insulin, COPD, CVA, BPH p/w R toe pain with non healing ulcer c/f PAD.    Plan:  - f/u ALMAZ/PVR with toe pressures  - Pain control  - Follow cardiology recs  - Follow podiatry recs.   - Follow ID recs  - plan for LE angio next week  - please continue ASA and statin      Vascular team    [id number]

## 2021-09-04 NOTE — PHYSICAL THERAPY INITIAL EVALUATION ADULT - PERTINENT HX OF CURRENT PROBLEM, REHAB EVAL
86M with PMH of HTN, HLD, Afib on coumadin, CHF, T2DM on insulin, COPD, CVA, BPH p/w R toe pain with non healing ulcer c/f PAD.   XRay R foot: Generalized osteopenia limits evaluation, however no visualized cortical bone erosion to suggest osteomyelitis. No acute displaced fracture or dislocation.

## 2021-09-05 LAB
ANION GAP SERPL CALC-SCNC: 11 MMOL/L — SIGNIFICANT CHANGE UP (ref 5–17)
APTT BLD: 30.3 SEC — SIGNIFICANT CHANGE UP (ref 27.5–35.5)
BUN SERPL-MCNC: 49 MG/DL — HIGH (ref 7–23)
CALCIUM SERPL-MCNC: 9.8 MG/DL — SIGNIFICANT CHANGE UP (ref 8.4–10.5)
CHLORIDE SERPL-SCNC: 105 MMOL/L — SIGNIFICANT CHANGE UP (ref 96–108)
CO2 SERPL-SCNC: 29 MMOL/L — SIGNIFICANT CHANGE UP (ref 22–31)
CREAT SERPL-MCNC: 1.72 MG/DL — HIGH (ref 0.5–1.3)
GLUCOSE BLDC GLUCOMTR-MCNC: 124 MG/DL — HIGH (ref 70–99)
GLUCOSE BLDC GLUCOMTR-MCNC: 204 MG/DL — HIGH (ref 70–99)
GLUCOSE BLDC GLUCOMTR-MCNC: 210 MG/DL — HIGH (ref 70–99)
GLUCOSE BLDC GLUCOMTR-MCNC: 234 MG/DL — HIGH (ref 70–99)
GLUCOSE BLDC GLUCOMTR-MCNC: 251 MG/DL — HIGH (ref 70–99)
GLUCOSE SERPL-MCNC: 145 MG/DL — HIGH (ref 70–99)
HCT VFR BLD CALC: 29.9 % — LOW (ref 39–50)
HGB BLD-MCNC: 9.3 G/DL — LOW (ref 13–17)
INR BLD: 1.49 RATIO — HIGH (ref 0.88–1.16)
MCHC RBC-ENTMCNC: 28.5 PG — SIGNIFICANT CHANGE UP (ref 27–34)
MCHC RBC-ENTMCNC: 31.1 GM/DL — LOW (ref 32–36)
MCV RBC AUTO: 91.7 FL — SIGNIFICANT CHANGE UP (ref 80–100)
NRBC # BLD: 0 /100 WBCS — SIGNIFICANT CHANGE UP (ref 0–0)
PLATELET # BLD AUTO: 181 K/UL — SIGNIFICANT CHANGE UP (ref 150–400)
POTASSIUM SERPL-MCNC: 3.4 MMOL/L — LOW (ref 3.5–5.3)
POTASSIUM SERPL-SCNC: 3.4 MMOL/L — LOW (ref 3.5–5.3)
PROTHROM AB SERPL-ACNC: 17.5 SEC — HIGH (ref 10.6–13.6)
RBC # BLD: 3.26 M/UL — LOW (ref 4.2–5.8)
RBC # FLD: 15.2 % — HIGH (ref 10.3–14.5)
SODIUM SERPL-SCNC: 145 MMOL/L — SIGNIFICANT CHANGE UP (ref 135–145)
WBC # BLD: 7.35 K/UL — SIGNIFICANT CHANGE UP (ref 3.8–10.5)
WBC # FLD AUTO: 7.35 K/UL — SIGNIFICANT CHANGE UP (ref 3.8–10.5)

## 2021-09-05 PROCEDURE — 71045 X-RAY EXAM CHEST 1 VIEW: CPT | Mod: 26

## 2021-09-05 PROCEDURE — 99232 SBSQ HOSP IP/OBS MODERATE 35: CPT

## 2021-09-05 RX ORDER — WARFARIN SODIUM 2.5 MG/1
5 TABLET ORAL ONCE
Refills: 0 | Status: DISCONTINUED | OUTPATIENT
Start: 2021-09-05 | End: 2021-09-05

## 2021-09-05 RX ORDER — HEPARIN SODIUM 5000 [USP'U]/ML
7500 INJECTION INTRAVENOUS; SUBCUTANEOUS EVERY 6 HOURS
Refills: 0 | Status: DISCONTINUED | OUTPATIENT
Start: 2021-09-05 | End: 2021-09-07

## 2021-09-05 RX ORDER — HEPARIN SODIUM 5000 [USP'U]/ML
INJECTION INTRAVENOUS; SUBCUTANEOUS
Qty: 25000 | Refills: 0 | Status: DISCONTINUED | OUTPATIENT
Start: 2021-09-05 | End: 2021-09-07

## 2021-09-05 RX ORDER — HEPARIN SODIUM 5000 [USP'U]/ML
3500 INJECTION INTRAVENOUS; SUBCUTANEOUS EVERY 6 HOURS
Refills: 0 | Status: DISCONTINUED | OUTPATIENT
Start: 2021-09-05 | End: 2021-09-07

## 2021-09-05 RX ORDER — POTASSIUM CHLORIDE 20 MEQ
40 PACKET (EA) ORAL ONCE
Refills: 0 | Status: COMPLETED | OUTPATIENT
Start: 2021-09-05 | End: 2021-09-05

## 2021-09-05 RX ADMIN — HEPARIN SODIUM 1700 UNIT(S)/HR: 5000 INJECTION INTRAVENOUS; SUBCUTANEOUS at 18:12

## 2021-09-05 RX ADMIN — TAMSULOSIN HYDROCHLORIDE 0.8 MILLIGRAM(S): 0.4 CAPSULE ORAL at 21:24

## 2021-09-05 RX ADMIN — Medication 1: at 22:27

## 2021-09-05 RX ADMIN — Medication 2: at 13:14

## 2021-09-05 RX ADMIN — Medication 2: at 19:36

## 2021-09-05 RX ADMIN — Medication 650 MILLIGRAM(S): at 21:25

## 2021-09-05 RX ADMIN — FINASTERIDE 5 MILLIGRAM(S): 5 TABLET, FILM COATED ORAL at 11:41

## 2021-09-05 RX ADMIN — Medication 0.5 MILLIGRAM(S): at 18:12

## 2021-09-05 RX ADMIN — Medication 25 MILLIGRAM(S): at 06:23

## 2021-09-05 RX ADMIN — Medication 650 MILLIGRAM(S): at 11:41

## 2021-09-05 RX ADMIN — MUPIROCIN 1 APPLICATION(S): 20 OINTMENT TOPICAL at 06:53

## 2021-09-05 RX ADMIN — PANTOPRAZOLE SODIUM 40 MILLIGRAM(S): 20 TABLET, DELAYED RELEASE ORAL at 06:23

## 2021-09-05 RX ADMIN — Medication 81 MILLIGRAM(S): at 11:40

## 2021-09-05 RX ADMIN — MUPIROCIN 1 APPLICATION(S): 20 OINTMENT TOPICAL at 18:12

## 2021-09-05 RX ADMIN — Medication 650 MILLIGRAM(S): at 12:41

## 2021-09-05 RX ADMIN — INSULIN GLARGINE 25 UNIT(S): 100 INJECTION, SOLUTION SUBCUTANEOUS at 22:26

## 2021-09-05 RX ADMIN — Medication 0: at 09:06

## 2021-09-05 RX ADMIN — LOSARTAN POTASSIUM 100 MILLIGRAM(S): 100 TABLET, FILM COATED ORAL at 06:23

## 2021-09-05 RX ADMIN — Medication 0.5 MILLIGRAM(S): at 06:23

## 2021-09-05 RX ADMIN — Medication 650 MILLIGRAM(S): at 22:28

## 2021-09-05 RX ADMIN — Medication 40 MILLIEQUIVALENT(S): at 10:59

## 2021-09-05 RX ADMIN — ATORVASTATIN CALCIUM 40 MILLIGRAM(S): 80 TABLET, FILM COATED ORAL at 21:25

## 2021-09-05 RX ADMIN — Medication 25 MICROGRAM(S): at 06:23

## 2021-09-05 RX ADMIN — MONTELUKAST 10 MILLIGRAM(S): 4 TABLET, CHEWABLE ORAL at 11:40

## 2021-09-05 RX ADMIN — Medication 20 MILLIGRAM(S): at 06:23

## 2021-09-05 RX ADMIN — Medication 20 MILLIGRAM(S): at 18:12

## 2021-09-05 NOTE — PROGRESS NOTE ADULT - SUBJECTIVE AND OBJECTIVE BOX
Follow Up:  Toe Wounds    Interval History: afebrile. no change in appearance of toes.     REVIEW OF SYSTEMS  [  ] ROS unobtainable because:    [ x ] All other systems negative except as noted below    Constitutional:  [ ] fever [ ] chills  [ ] weight loss  [ ] weakness  Skin:  [x ] rash [ ] phlebitis	  Eyes: [ ] icterus [ ] pain  [ ] discharge	  ENMT: [ ] sore throat  [ ] thrush [ ] ulcers [ ] exudates  Respiratory: [ ] dyspnea [ ] hemoptysis [ ] cough [ ] sputum	  Cardiovascular:  [ ] chest pain [ ] palpitations [ ] edema	  Gastrointestinal:  [ ] nausea [ ] vomiting [ ] diarrhea [ ] constipation [ ] pain	  Genitourinary:  [ ] dysuria [ ] frequency [ ] hematuria [ ] discharge [ ] flank pain  [ ] incontinence  Musculoskeletal:  [ ] myalgias [ ] arthralgias [ ] arthritis  [ ] back pain  Neurological:  [ ] headache [ ] seizures  [ ] confusion/altered mental status    Allergies  No Known Allergies        ANTIMICROBIALS:      OTHER MEDS:  MEDICATIONS  (STANDING):  acetaminophen   Tablet .. 650 every 6 hours PRN  aluminum hydroxide/magnesium hydroxide/simethicone Suspension 30 every 4 hours PRN  aspirin  chewable 81 daily  atorvastatin 40 at bedtime  buDESOnide    Inhalation Suspension 0.5 two times a day  dextrose 40% Gel 15 once  dextrose 50% Injectable 25 once  dextrose 50% Injectable 12.5 once  dextrose 50% Injectable 25 once  finasteride 5 daily  furosemide    Tablet 20 two times a day  glucagon  Injectable 1 once  insulin glargine Injectable (LANTUS) 25 at bedtime  insulin lispro (ADMELOG) corrective regimen sliding scale  three times a day before meals  insulin lispro (ADMELOG) corrective regimen sliding scale  at bedtime  levothyroxine 25 daily  losartan 100 daily  melatonin 3 at bedtime PRN  metolazone 2.5 <User Schedule>  metoprolol succinate ER 25 daily  montelukast 10 daily  ondansetron Injectable 4 every 8 hours PRN  pantoprazole    Tablet 40 before breakfast  tamsulosin 0.8 at bedtime  warfarin 5 once      Vital Signs Last 24 Hrs  T(C): 36.2 (05 Sep 2021 11:54), Max: 36.7 (05 Sep 2021 04:43)  T(F): 97.1 (05 Sep 2021 11:54), Max: 98 (05 Sep 2021 04:43)  HR: 76 (05 Sep 2021 11:54) (74 - 92)  BP: 103/65 (05 Sep 2021 11:54) (103/65 - 139/69)  BP(mean): --  RR: 17 (05 Sep 2021 11:54) (17 - 18)  SpO2: 95% (05 Sep 2021 11:54) (93% - 95%)    PHYSICAL EXAMINATION:  General: Alert and Awake, NAD  Cardiac: RRR, No M/R/G  Resp: CTAB, No Wh/Rh/Ra  Abdomen: NBS, NT/ND, No HSM, No rigidity or guarding  MSK: dry scaling, erythematous skin, onychomycosis, right fourth digit with ulcer, all toes are erythematous. No LE edema. No Calf tenderness  : No hudson  Skin: No rashes or lesions. Skin is warm and dry to the touch.   Neuro: Alert and Awake. CN 2-12 Grossly intact. Moves all four extremities spontaneously.  Psych: Calm, Pleasant, Cooperative                          9.3    7.35  )-----------( 181      ( 05 Sep 2021 07:19 )             29.9       09-05    145  |  105  |  49<H>  ----------------------------<  145<H>  3.4<L>   |  29  |  1.72<H>    Ca    9.8      05 Sep 2021 07:19      MICROBIOLOGY:    .Blood Blood-Venous  09-02-21   Growth in aerobic bottle: Staphylococcus epidermidis  Coag Negative Staphylococcus  Single set isolate, possible contaminant. Contact  Microbiology if susceptibility testing clinically  indicated.  ***Blood Panel PCR results on this specimen are available  approximately 3 hours after the Gram stain result.***  Gram stain, PCR, and/or culture results may not always  correspond due to difference in methodologies.  ************************************************************  This PCR assay was performed by multiplex PCR. This  Assay tests for 66 bacterial and resistance gene targets.  Please refer to the [facility] [employer] test directory  at [email or web address] for details.  --  Blood Culture PCR    .Blood Blood-Venous  09-02-21   No growth to date.  --  --    RADIOLOGY:    <The imaging below has been reviewed and visualized by me independently. Findings as detailed in report below>    EXAM:  XR FOOT COMP MIN 3 VIEWS RT                        PROCEDURE DATE:  09/02/2021    Generalized osteopenia limits evaluation, however no visualized cortical bone erosion to suggest osteomyelitis.  No acute displaced fracture or dislocation.

## 2021-09-05 NOTE — PROGRESS NOTE ADULT - ASSESSMENT
86M with PMH of HTN, HLD, Afib on coumadin, CHF, T2DM on insulin, COPD, CVA, BPH p/w R toe pain and ulcer. Pt states he has been having weeks of R great toe pain with discoloration - pain is worse at night, severe, disturbs his sleep, located only along R great toe without radiation; has noted all his toes have been more red during the same time, but denies any pain in his other toes or L foot. Pain is well controlled during the day with tylenol and pt is still able to ambulate as normal. A couple of week ago, pt wife stepped on his toe which led to a blister on his 4th R toe; went to podiatry who lanced the blister but it has not been healing well despite local wound care; denies any significant pain or drainage. Pt endorses intact sensation in b/l LE. Went to see vascular Dr Moy in the office and was referred to ED for c/f PAD and possible gangrene.     Denies any associated fevers, chills, nausea/vomiting, LE swelling outside his baseline, no calf pain, no other skin changes. Rest of ROS negative.     A/P    #Positive Blood Culture (Staph epi)  --Likely procurement contaminant  --Follow up on repeat BCx     #Foot Ulcer  Discussed case with podiatry attending  no evidence of gangrene  yes- dependent rubor and superficial ulcer in the right fourth toe  no evidence of cellulitis  dry scaley skin  no fever and no elevated WBC  for now, will just use topical Bactroban but will start systemic treatment if change in status    #PVD  check arterial dopplers    #LE edema  check venous dopplers  check echo    #onychomycosis  podiatry to comment    #NONHEALING ulcer right NARES  suggest dermatology input for right nares- has been there for months and for dry scaley skin in general    # right toe pain  from ulcer  feet are sensitive in general- likely from scaley cracked skin but would also check a uric acid    Omer Brewer M.D.  [facility] Division of Infectious Disease  8AM-5PM: Pager Number [phone]  After Hours (or if no response): Please contact the Infectious Diseases Office at [phone]     The above assessment and plan were discussed with Dr Pulliam

## 2021-09-05 NOTE — PROGRESS NOTE ADULT - SUBJECTIVE AND OBJECTIVE BOX
Patient is a 86y old  Male who presents with a chief complaint of R toe pain (05 Sep 2021 15:09)      SUBJECTIVE / OVERNIGHT EVENTS:    Events noted.  CONSTITUTIONAL: No fever,  or fatigue  RESPIRATORY: No cough, wheezing,  No shortness of breath  CARDIOVASCULAR: No chest pain, palpitations, dizziness, or leg swelling  GASTROINTESTINAL: No abdominal or epigastric pain. No nausea, vomiting.  NEUROLOGICAL: No headaches,     MEDICATIONS  (STANDING):  aspirin  chewable 81 milliGRAM(s) Oral daily  atorvastatin 40 milliGRAM(s) Oral at bedtime  buDESOnide    Inhalation Suspension 0.5 milliGRAM(s) Inhalation two times a day  dextrose 40% Gel 15 Gram(s) Oral once  dextrose 5%. 1000 milliLiter(s) (50 mL/Hr) IV Continuous <Continuous>  dextrose 5%. 1000 milliLiter(s) (100 mL/Hr) IV Continuous <Continuous>  dextrose 50% Injectable 25 Gram(s) IV Push once  dextrose 50% Injectable 12.5 Gram(s) IV Push once  dextrose 50% Injectable 25 Gram(s) IV Push once  finasteride 5 milliGRAM(s) Oral daily  furosemide    Tablet 20 milliGRAM(s) Oral two times a day  glucagon  Injectable 1 milliGRAM(s) IntraMuscular once  heparin  Infusion.  Unit(s)/Hr (17 mL/Hr) IV Continuous <Continuous>  insulin glargine Injectable (LANTUS) 25 Unit(s) SubCutaneous at bedtime  insulin lispro (ADMELOG) corrective regimen sliding scale   SubCutaneous three times a day before meals  insulin lispro (ADMELOG) corrective regimen sliding scale   SubCutaneous at bedtime  levothyroxine 25 MICROGram(s) Oral daily  losartan 100 milliGRAM(s) Oral daily  metolazone 2.5 milliGRAM(s) Oral <User Schedule>  metoprolol succinate ER 25 milliGRAM(s) Oral daily  montelukast 10 milliGRAM(s) Oral daily  mupirocin 2% Ointment 1 Application(s) Topical two times a day  pantoprazole    Tablet 40 milliGRAM(s) Oral before breakfast  tamsulosin 0.8 milliGRAM(s) Oral at bedtime    MEDICATIONS  (PRN):  acetaminophen   Tablet .. 650 milliGRAM(s) Oral every 6 hours PRN Temp greater or equal to 38C (100.4F), Mild Pain (1 - 3)  aluminum hydroxide/magnesium hydroxide/simethicone Suspension 30 milliLiter(s) Oral every 4 hours PRN Dyspepsia  heparin   Injectable 7500 Unit(s) IV Push every 6 hours PRN For aPTT less than 40  heparin   Injectable 3500 Unit(s) IV Push every 6 hours PRN For aPTT between 40 - 57  melatonin 3 milliGRAM(s) Oral at bedtime PRN Insomnia  ondansetron Injectable 4 milliGRAM(s) IV Push every 8 hours PRN Nausea and/or Vomiting        CAPILLARY BLOOD GLUCOSE      POCT Blood Glucose.: 251 mg/dL (05 Sep 2021 22:08)  POCT Blood Glucose.: 234 mg/dL (05 Sep 2021 19:34)  POCT Blood Glucose.: 204 mg/dL (05 Sep 2021 18:07)  POCT Blood Glucose.: 210 mg/dL (05 Sep 2021 12:37)  POCT Blood Glucose.: 124 mg/dL (05 Sep 2021 08:24)    I&O's Summary    04 Sep 2021 07:01  -  05 Sep 2021 07:00  --------------------------------------------------------  IN: 120 mL / OUT: 1650 mL / NET: -1530 mL        T(C): 36.4 (09-05-21 @ 21:02), Max: 36.7 (09-05-21 @ 04:43)  HR: 69 (09-05-21 @ 21:02) (69 - 92)  BP: 119/63 (09-05-21 @ 21:02) (103/65 - 135/80)  RR: 18 (09-05-21 @ 21:02) (17 - 18)  SpO2: 96% (09-05-21 @ 21:02) (93% - 96%)    PHYSICAL EXAM:  GENERAL: NAD  NECK: Supple, No JVD  CHEST/LUNG: Clear to auscultation bilaterally; No wheezing.  HEART: Regular rate and rhythm; No murmurs, rubs, or gallops  ABDOMEN: Soft, Nontender, Nondistended; Bowel sounds present  EXTREMITIES:   Pedal edema/Redness toes  NEUROLOGY: AAO X 3      LABS:                        9.3    7.35  )-----------( 181      ( 05 Sep 2021 07:19 )             29.9     09-05    145  |  105  |  49<H>  ----------------------------<  145<H>  3.4<L>   |  29  |  1.72<H>    Ca    9.8      05 Sep 2021 07:19      PT/INR - ( 05 Sep 2021 07:19 )   PT: 17.5 sec;   INR: 1.49 ratio         PTT - ( 05 Sep 2021 17:43 )  PTT:30.3 sec        CAPILLARY BLOOD GLUCOSE      POCT Blood Glucose.: 251 mg/dL (05 Sep 2021 22:08)  POCT Blood Glucose.: 234 mg/dL (05 Sep 2021 19:34)  POCT Blood Glucose.: 204 mg/dL (05 Sep 2021 18:07)  POCT Blood Glucose.: 210 mg/dL (05 Sep 2021 12:37)  POCT Blood Glucose.: 124 mg/dL (05 Sep 2021 08:24)        RADIOLOGY & ADDITIONAL TESTS:    Imaging Personally Reviewed:    Consultant(s) Notes Reviewed:      Care Discussed with Consultants/Other Providers:    Graham Pulliam MD, CMD, FACP    [address] [place] [address]  Office Tel: [phone]  Cell: [phone]

## 2021-09-06 LAB
ANION GAP SERPL CALC-SCNC: 13 MMOL/L — SIGNIFICANT CHANGE UP (ref 5–17)
APTT BLD: 103 SEC — HIGH (ref 27.5–35.5)
APTT BLD: 159.8 SEC — CRITICAL HIGH (ref 27.5–35.5)
APTT BLD: 172.2 SEC — CRITICAL HIGH (ref 27.5–35.5)
BUN SERPL-MCNC: 66 MG/DL — HIGH (ref 7–23)
CALCIUM SERPL-MCNC: 9.6 MG/DL — SIGNIFICANT CHANGE UP (ref 8.4–10.5)
CHLORIDE SERPL-SCNC: 103 MMOL/L — SIGNIFICANT CHANGE UP (ref 96–108)
CO2 SERPL-SCNC: 28 MMOL/L — SIGNIFICANT CHANGE UP (ref 22–31)
CREAT SERPL-MCNC: 1.98 MG/DL — HIGH (ref 0.5–1.3)
GLUCOSE BLDC GLUCOMTR-MCNC: 167 MG/DL — HIGH (ref 70–99)
GLUCOSE BLDC GLUCOMTR-MCNC: 178 MG/DL — HIGH (ref 70–99)
GLUCOSE BLDC GLUCOMTR-MCNC: 286 MG/DL — HIGH (ref 70–99)
GLUCOSE BLDC GLUCOMTR-MCNC: 312 MG/DL — HIGH (ref 70–99)
GLUCOSE SERPL-MCNC: 171 MG/DL — HIGH (ref 70–99)
HCT VFR BLD CALC: 30.5 % — LOW (ref 39–50)
HCT VFR BLD CALC: 31.6 % — LOW (ref 39–50)
HGB BLD-MCNC: 9.3 G/DL — LOW (ref 13–17)
HGB BLD-MCNC: 9.6 G/DL — LOW (ref 13–17)
MCHC RBC-ENTMCNC: 27.9 PG — SIGNIFICANT CHANGE UP (ref 27–34)
MCHC RBC-ENTMCNC: 28.3 PG — SIGNIFICANT CHANGE UP (ref 27–34)
MCHC RBC-ENTMCNC: 30.4 GM/DL — LOW (ref 32–36)
MCHC RBC-ENTMCNC: 30.5 GM/DL — LOW (ref 32–36)
MCV RBC AUTO: 91.6 FL — SIGNIFICANT CHANGE UP (ref 80–100)
MCV RBC AUTO: 93.2 FL — SIGNIFICANT CHANGE UP (ref 80–100)
NRBC # BLD: 0 /100 WBCS — SIGNIFICANT CHANGE UP (ref 0–0)
NRBC # BLD: 0 /100 WBCS — SIGNIFICANT CHANGE UP (ref 0–0)
PLATELET # BLD AUTO: 168 K/UL — SIGNIFICANT CHANGE UP (ref 150–400)
PLATELET # BLD AUTO: 174 K/UL — SIGNIFICANT CHANGE UP (ref 150–400)
POTASSIUM SERPL-MCNC: 3.7 MMOL/L — SIGNIFICANT CHANGE UP (ref 3.5–5.3)
POTASSIUM SERPL-SCNC: 3.7 MMOL/L — SIGNIFICANT CHANGE UP (ref 3.5–5.3)
RBC # BLD: 3.33 M/UL — LOW (ref 4.2–5.8)
RBC # BLD: 3.39 M/UL — LOW (ref 4.2–5.8)
RBC # FLD: 15.4 % — HIGH (ref 10.3–14.5)
RBC # FLD: 15.5 % — HIGH (ref 10.3–14.5)
SODIUM SERPL-SCNC: 144 MMOL/L — SIGNIFICANT CHANGE UP (ref 135–145)
WBC # BLD: 7.12 K/UL — SIGNIFICANT CHANGE UP (ref 3.8–10.5)
WBC # BLD: 7.73 K/UL — SIGNIFICANT CHANGE UP (ref 3.8–10.5)
WBC # FLD AUTO: 7.12 K/UL — SIGNIFICANT CHANGE UP (ref 3.8–10.5)
WBC # FLD AUTO: 7.73 K/UL — SIGNIFICANT CHANGE UP (ref 3.8–10.5)

## 2021-09-06 RX ADMIN — Medication 4: at 12:13

## 2021-09-06 RX ADMIN — Medication 650 MILLIGRAM(S): at 14:23

## 2021-09-06 RX ADMIN — HEPARIN SODIUM 0 UNIT(S)/HR: 5000 INJECTION INTRAVENOUS; SUBCUTANEOUS at 16:55

## 2021-09-06 RX ADMIN — Medication 25 MILLIGRAM(S): at 06:09

## 2021-09-06 RX ADMIN — Medication 650 MILLIGRAM(S): at 13:23

## 2021-09-06 RX ADMIN — Medication 20 MILLIGRAM(S): at 17:34

## 2021-09-06 RX ADMIN — HEPARIN SODIUM 1500 UNIT(S)/HR: 5000 INJECTION INTRAVENOUS; SUBCUTANEOUS at 00:50

## 2021-09-06 RX ADMIN — HEPARIN SODIUM 900 UNIT(S)/HR: 5000 INJECTION INTRAVENOUS; SUBCUTANEOUS at 18:13

## 2021-09-06 RX ADMIN — Medication 1: at 08:55

## 2021-09-06 RX ADMIN — Medication 81 MILLIGRAM(S): at 11:36

## 2021-09-06 RX ADMIN — Medication 0.5 MILLIGRAM(S): at 17:34

## 2021-09-06 RX ADMIN — HEPARIN SODIUM 0 UNIT(S)/HR: 5000 INJECTION INTRAVENOUS; SUBCUTANEOUS at 08:25

## 2021-09-06 RX ADMIN — HEPARIN SODIUM 1200 UNIT(S)/HR: 5000 INJECTION INTRAVENOUS; SUBCUTANEOUS at 09:45

## 2021-09-06 RX ADMIN — Medication 20 MILLIGRAM(S): at 06:14

## 2021-09-06 RX ADMIN — TAMSULOSIN HYDROCHLORIDE 0.8 MILLIGRAM(S): 0.4 CAPSULE ORAL at 22:39

## 2021-09-06 RX ADMIN — Medication 1: at 17:33

## 2021-09-06 RX ADMIN — MONTELUKAST 10 MILLIGRAM(S): 4 TABLET, CHEWABLE ORAL at 11:36

## 2021-09-06 RX ADMIN — INSULIN GLARGINE 25 UNIT(S): 100 INJECTION, SOLUTION SUBCUTANEOUS at 22:39

## 2021-09-06 RX ADMIN — Medication 25 MICROGRAM(S): at 06:10

## 2021-09-06 RX ADMIN — MUPIROCIN 1 APPLICATION(S): 20 OINTMENT TOPICAL at 17:34

## 2021-09-06 RX ADMIN — MUPIROCIN 1 APPLICATION(S): 20 OINTMENT TOPICAL at 06:10

## 2021-09-06 RX ADMIN — Medication 650 MILLIGRAM(S): at 06:09

## 2021-09-06 RX ADMIN — PANTOPRAZOLE SODIUM 40 MILLIGRAM(S): 20 TABLET, DELAYED RELEASE ORAL at 06:09

## 2021-09-06 RX ADMIN — ATORVASTATIN CALCIUM 40 MILLIGRAM(S): 80 TABLET, FILM COATED ORAL at 22:39

## 2021-09-06 RX ADMIN — Medication 0.5 MILLIGRAM(S): at 06:33

## 2021-09-06 RX ADMIN — LOSARTAN POTASSIUM 100 MILLIGRAM(S): 100 TABLET, FILM COATED ORAL at 06:10

## 2021-09-06 RX ADMIN — Medication 1: at 22:40

## 2021-09-06 RX ADMIN — Medication 650 MILLIGRAM(S): at 06:37

## 2021-09-06 RX ADMIN — FINASTERIDE 5 MILLIGRAM(S): 5 TABLET, FILM COATED ORAL at 11:37

## 2021-09-06 NOTE — PROGRESS NOTE ADULT - SUBJECTIVE AND OBJECTIVE BOX
Patient is a 86y old  Male who presents with a chief complaint of R toe pain (05 Sep 2021 16:07)      SUBJECTIVE / OVERNIGHT EVENTS:    Events noted.  CONSTITUTIONAL: No fever,  or fatigue  RESPIRATORY: No cough, wheezing,  No shortness of breath  CARDIOVASCULAR: No chest pain, palpitations,   GASTROINTESTINAL: No abdominal or epigastric pain.   NEUROLOGICAL: No headaches,     MEDICATIONS  (STANDING):  aspirin  chewable 81 milliGRAM(s) Oral daily  atorvastatin 40 milliGRAM(s) Oral at bedtime  buDESOnide    Inhalation Suspension 0.5 milliGRAM(s) Inhalation two times a day  dextrose 40% Gel 15 Gram(s) Oral once  dextrose 5%. 1000 milliLiter(s) (50 mL/Hr) IV Continuous <Continuous>  dextrose 5%. 1000 milliLiter(s) (100 mL/Hr) IV Continuous <Continuous>  dextrose 50% Injectable 25 Gram(s) IV Push once  dextrose 50% Injectable 12.5 Gram(s) IV Push once  dextrose 50% Injectable 25 Gram(s) IV Push once  finasteride 5 milliGRAM(s) Oral daily  furosemide    Tablet 20 milliGRAM(s) Oral two times a day  glucagon  Injectable 1 milliGRAM(s) IntraMuscular once  heparin  Infusion.  Unit(s)/Hr (17 mL/Hr) IV Continuous <Continuous>  insulin glargine Injectable (LANTUS) 25 Unit(s) SubCutaneous at bedtime  insulin lispro (ADMELOG) corrective regimen sliding scale   SubCutaneous three times a day before meals  insulin lispro (ADMELOG) corrective regimen sliding scale   SubCutaneous at bedtime  levothyroxine 25 MICROGram(s) Oral daily  losartan 100 milliGRAM(s) Oral daily  metolazone 2.5 milliGRAM(s) Oral <User Schedule>  metoprolol succinate ER 25 milliGRAM(s) Oral daily  montelukast 10 milliGRAM(s) Oral daily  mupirocin 2% Ointment 1 Application(s) Topical two times a day  pantoprazole    Tablet 40 milliGRAM(s) Oral before breakfast  tamsulosin 0.8 milliGRAM(s) Oral at bedtime    MEDICATIONS  (PRN):  acetaminophen   Tablet .. 650 milliGRAM(s) Oral every 6 hours PRN Temp greater or equal to 38C (100.4F), Mild Pain (1 - 3)  aluminum hydroxide/magnesium hydroxide/simethicone Suspension 30 milliLiter(s) Oral every 4 hours PRN Dyspepsia  heparin   Injectable 3500 Unit(s) IV Push every 6 hours PRN For aPTT between 40 - 57  heparin   Injectable 7500 Unit(s) IV Push every 6 hours PRN For aPTT less than 40  melatonin 3 milliGRAM(s) Oral at bedtime PRN Insomnia  ondansetron Injectable 4 milliGRAM(s) IV Push every 8 hours PRN Nausea and/or Vomiting        CAPILLARY BLOOD GLUCOSE      POCT Blood Glucose.: 286 mg/dL (06 Sep 2021 22:06)  POCT Blood Glucose.: 178 mg/dL (06 Sep 2021 17:26)  POCT Blood Glucose.: 312 mg/dL (06 Sep 2021 12:02)  POCT Blood Glucose.: 167 mg/dL (06 Sep 2021 08:38)    I&O's Summary    06 Sep 2021 07:01  -  06 Sep 2021 22:53  --------------------------------------------------------  IN: 0 mL / OUT: 400 mL / NET: -400 mL        T(C): 36.4 (09-06-21 @ 21:45), Max: 36.7 (09-06-21 @ 13:47)  HR: 84 (09-06-21 @ 21:45) (71 - 84)  BP: 122/72 (09-06-21 @ 21:45) (122/72 - 126/74)  RR: 18 (09-06-21 @ 21:45) (18 - 18)  SpO2: 97% (09-06-21 @ 21:45) (96% - 97%)    PHYSICAL EXAM:  GENERAL: NAD  NECK: Supple, No JVD  CHEST/LUNG: Clear to auscultation bilaterally; No wheezing.  HEART: Regular rate and rhythm; No murmurs, rubs, or gallops  ABDOMEN: Soft, Nontender, Nondistended; Bowel sounds present  EXTREMITIES:   No edema  NEUROLOGY: AAO X 3      LABS:                        9.6    7.12  )-----------( 168      ( 06 Sep 2021 07:10 )             31.6     09-06    144  |  103  |  66<H>  ----------------------------<  171<H>  3.7   |  28  |  1.98<H>    Ca    9.6      06 Sep 2021 07:09      PT/INR - ( 05 Sep 2021 07:19 )   PT: 17.5 sec;   INR: 1.49 ratio         PTT - ( 06 Sep 2021 16:19 )  PTT:159.8 sec        CAPILLARY BLOOD GLUCOSE      POCT Blood Glucose.: 286 mg/dL (06 Sep 2021 22:06)  POCT Blood Glucose.: 178 mg/dL (06 Sep 2021 17:26)  POCT Blood Glucose.: 312 mg/dL (06 Sep 2021 12:02)  POCT Blood Glucose.: 167 mg/dL (06 Sep 2021 08:38)        RADIOLOGY & ADDITIONAL TESTS:    Imaging Personally Reviewed:    Consultant(s) Notes Reviewed:      Care Discussed with Consultants/Other Providers:    Graham Pulliam MD, CMD, FACP    [address] [place] [address]  Office Tel: [phone]  Cell: [phone]

## 2021-09-07 ENCOUNTER — TRANSCRIPTION ENCOUNTER (OUTPATIENT)
Age: 86
End: 2021-09-07

## 2021-09-07 LAB
ANION GAP SERPL CALC-SCNC: 14 MMOL/L — SIGNIFICANT CHANGE UP (ref 5–17)
APPEARANCE UR: CLEAR — SIGNIFICANT CHANGE UP
APTT BLD: 105.4 SEC — HIGH (ref 27.5–35.5)
APTT BLD: 49.8 SEC — HIGH (ref 27.5–35.5)
APTT BLD: 55.4 SEC — HIGH (ref 27.5–35.5)
APTT BLD: 64.8 SEC — HIGH (ref 27.5–35.5)
BILIRUB UR-MCNC: NEGATIVE — SIGNIFICANT CHANGE UP
BLD GP AB SCN SERPL QL: NEGATIVE — SIGNIFICANT CHANGE UP
BUN SERPL-MCNC: 59 MG/DL — HIGH (ref 7–23)
CALCIUM SERPL-MCNC: 9.4 MG/DL — SIGNIFICANT CHANGE UP (ref 8.4–10.5)
CHLORIDE SERPL-SCNC: 103 MMOL/L — SIGNIFICANT CHANGE UP (ref 96–108)
CHLORIDE UR-SCNC: 57 MMOL/L — SIGNIFICANT CHANGE UP
CO2 SERPL-SCNC: 28 MMOL/L — SIGNIFICANT CHANGE UP (ref 22–31)
COLOR SPEC: SIGNIFICANT CHANGE UP
CREAT ?TM UR-MCNC: 60 MG/DL — SIGNIFICANT CHANGE UP
CREAT SERPL-MCNC: 1.75 MG/DL — HIGH (ref 0.5–1.3)
CULTURE RESULTS: SIGNIFICANT CHANGE UP
DIFF PNL FLD: NEGATIVE — SIGNIFICANT CHANGE UP
GLUCOSE BLDC GLUCOMTR-MCNC: 165 MG/DL — HIGH (ref 70–99)
GLUCOSE BLDC GLUCOMTR-MCNC: 189 MG/DL — HIGH (ref 70–99)
GLUCOSE BLDC GLUCOMTR-MCNC: 213 MG/DL — HIGH (ref 70–99)
GLUCOSE BLDC GLUCOMTR-MCNC: 229 MG/DL — HIGH (ref 70–99)
GLUCOSE BLDC GLUCOMTR-MCNC: 239 MG/DL — HIGH (ref 70–99)
GLUCOSE SERPL-MCNC: 245 MG/DL — HIGH (ref 70–99)
GLUCOSE UR QL: NEGATIVE — SIGNIFICANT CHANGE UP
HCT VFR BLD CALC: 30.9 % — LOW (ref 39–50)
HGB BLD-MCNC: 9.5 G/DL — LOW (ref 13–17)
KETONES UR-MCNC: NEGATIVE — SIGNIFICANT CHANGE UP
LEUKOCYTE ESTERASE UR-ACNC: NEGATIVE — SIGNIFICANT CHANGE UP
MCHC RBC-ENTMCNC: 28.2 PG — SIGNIFICANT CHANGE UP (ref 27–34)
MCHC RBC-ENTMCNC: 30.7 GM/DL — LOW (ref 32–36)
MCV RBC AUTO: 91.7 FL — SIGNIFICANT CHANGE UP (ref 80–100)
NITRITE UR-MCNC: NEGATIVE — SIGNIFICANT CHANGE UP
NRBC # BLD: 0 /100 WBCS — SIGNIFICANT CHANGE UP (ref 0–0)
OSMOLALITY UR: 478 MOS/KG — SIGNIFICANT CHANGE UP (ref 300–900)
PH UR: 6 — SIGNIFICANT CHANGE UP (ref 5–8)
PLATELET # BLD AUTO: 179 K/UL — SIGNIFICANT CHANGE UP (ref 150–400)
POTASSIUM SERPL-MCNC: 3.7 MMOL/L — SIGNIFICANT CHANGE UP (ref 3.5–5.3)
POTASSIUM SERPL-SCNC: 3.7 MMOL/L — SIGNIFICANT CHANGE UP (ref 3.5–5.3)
PROT UR-MCNC: NEGATIVE — SIGNIFICANT CHANGE UP
RBC # BLD: 3.37 M/UL — LOW (ref 4.2–5.8)
RBC # FLD: 15.2 % — HIGH (ref 10.3–14.5)
RH IG SCN BLD-IMP: POSITIVE — SIGNIFICANT CHANGE UP
SARS-COV-2 RNA SPEC QL NAA+PROBE: SIGNIFICANT CHANGE UP
SODIUM SERPL-SCNC: 145 MMOL/L — SIGNIFICANT CHANGE UP (ref 135–145)
SODIUM UR-SCNC: 65 MMOL/L — SIGNIFICANT CHANGE UP
SP GR SPEC: 1.02 — SIGNIFICANT CHANGE UP (ref 1.01–1.02)
SPECIMEN SOURCE: SIGNIFICANT CHANGE UP
UROBILINOGEN FLD QL: NEGATIVE — SIGNIFICANT CHANGE UP
WBC # BLD: 7.34 K/UL — SIGNIFICANT CHANGE UP (ref 3.8–10.5)
WBC # FLD AUTO: 7.34 K/UL — SIGNIFICANT CHANGE UP (ref 3.8–10.5)

## 2021-09-07 PROCEDURE — 99232 SBSQ HOSP IP/OBS MODERATE 35: CPT

## 2021-09-07 PROCEDURE — 99231 SBSQ HOSP IP/OBS SF/LOW 25: CPT | Mod: 57

## 2021-09-07 RX ORDER — HEPARIN SODIUM 5000 [USP'U]/ML
3500 INJECTION INTRAVENOUS; SUBCUTANEOUS EVERY 6 HOURS
Refills: 0 | Status: DISCONTINUED | OUTPATIENT
Start: 2021-09-07 | End: 2021-09-08

## 2021-09-07 RX ORDER — INSULIN LISPRO 100/ML
4 VIAL (ML) SUBCUTANEOUS
Refills: 0 | Status: DISCONTINUED | OUTPATIENT
Start: 2021-09-07 | End: 2021-09-08

## 2021-09-07 RX ORDER — ACETYLCYSTEINE 200 MG/ML
1200 VIAL (ML) MISCELLANEOUS EVERY 12 HOURS
Refills: 0 | Status: COMPLETED | OUTPATIENT
Start: 2021-09-07 | End: 2021-09-08

## 2021-09-07 RX ORDER — ACETYLCYSTEINE 200 MG/ML
1200 VIAL (ML) MISCELLANEOUS EVERY 12 HOURS
Refills: 0 | Status: DISCONTINUED | OUTPATIENT
Start: 2021-09-08 | End: 2021-09-08

## 2021-09-07 RX ORDER — HEPARIN SODIUM 5000 [USP'U]/ML
7000 INJECTION INTRAVENOUS; SUBCUTANEOUS EVERY 6 HOURS
Refills: 0 | Status: DISCONTINUED | OUTPATIENT
Start: 2021-09-07 | End: 2021-09-08

## 2021-09-07 RX ORDER — SODIUM CHLORIDE 9 MG/ML
1000 INJECTION INTRAMUSCULAR; INTRAVENOUS; SUBCUTANEOUS
Refills: 0 | Status: DISCONTINUED | OUTPATIENT
Start: 2021-09-07 | End: 2021-09-08

## 2021-09-07 RX ORDER — ACETAMINOPHEN 500 MG
650 TABLET ORAL ONCE
Refills: 0 | Status: COMPLETED | OUTPATIENT
Start: 2021-09-07 | End: 2021-09-07

## 2021-09-07 RX ORDER — HEPARIN SODIUM 5000 [USP'U]/ML
700 INJECTION INTRAVENOUS; SUBCUTANEOUS
Qty: 25000 | Refills: 0 | Status: DISCONTINUED | OUTPATIENT
Start: 2021-09-07 | End: 2021-09-08

## 2021-09-07 RX ORDER — SODIUM CHLORIDE 9 MG/ML
1000 INJECTION INTRAMUSCULAR; INTRAVENOUS; SUBCUTANEOUS
Refills: 0 | Status: COMPLETED | OUTPATIENT
Start: 2021-09-08 | End: 2021-09-08

## 2021-09-07 RX ADMIN — Medication 650 MILLIGRAM(S): at 11:04

## 2021-09-07 RX ADMIN — Medication 0.5 MILLIGRAM(S): at 05:56

## 2021-09-07 RX ADMIN — FINASTERIDE 5 MILLIGRAM(S): 5 TABLET, FILM COATED ORAL at 12:47

## 2021-09-07 RX ADMIN — Medication 1: at 13:04

## 2021-09-07 RX ADMIN — HEPARIN SODIUM 900 UNIT(S)/HR: 5000 INJECTION INTRAVENOUS; SUBCUTANEOUS at 10:35

## 2021-09-07 RX ADMIN — TAMSULOSIN HYDROCHLORIDE 0.8 MILLIGRAM(S): 0.4 CAPSULE ORAL at 22:39

## 2021-09-07 RX ADMIN — MUPIROCIN 1 APPLICATION(S): 20 OINTMENT TOPICAL at 18:02

## 2021-09-07 RX ADMIN — Medication 650 MILLIGRAM(S): at 17:47

## 2021-09-07 RX ADMIN — Medication 0.5 MILLIGRAM(S): at 17:08

## 2021-09-07 RX ADMIN — HEPARIN SODIUM 900 UNIT(S)/HR: 5000 INJECTION INTRAVENOUS; SUBCUTANEOUS at 18:01

## 2021-09-07 RX ADMIN — MUPIROCIN 1 APPLICATION(S): 20 OINTMENT TOPICAL at 05:56

## 2021-09-07 RX ADMIN — Medication 4 UNIT(S): at 18:16

## 2021-09-07 RX ADMIN — PANTOPRAZOLE SODIUM 40 MILLIGRAM(S): 20 TABLET, DELAYED RELEASE ORAL at 10:05

## 2021-09-07 RX ADMIN — Medication 2: at 18:02

## 2021-09-07 RX ADMIN — HEPARIN SODIUM 700 UNIT(S)/HR: 5000 INJECTION INTRAVENOUS; SUBCUTANEOUS at 03:04

## 2021-09-07 RX ADMIN — HEPARIN SODIUM 700 UNIT(S)/HR: 5000 INJECTION INTRAVENOUS; SUBCUTANEOUS at 02:51

## 2021-09-07 RX ADMIN — Medication 25 MICROGRAM(S): at 05:56

## 2021-09-07 RX ADMIN — Medication 1: at 08:44

## 2021-09-07 RX ADMIN — Medication 25 MILLIGRAM(S): at 05:57

## 2021-09-07 RX ADMIN — INSULIN GLARGINE 25 UNIT(S): 100 INJECTION, SOLUTION SUBCUTANEOUS at 22:38

## 2021-09-07 RX ADMIN — Medication 81 MILLIGRAM(S): at 12:47

## 2021-09-07 RX ADMIN — MONTELUKAST 10 MILLIGRAM(S): 4 TABLET, CHEWABLE ORAL at 12:47

## 2021-09-07 RX ADMIN — Medication 650 MILLIGRAM(S): at 10:04

## 2021-09-07 RX ADMIN — Medication 1200 MILLIGRAM(S): at 17:53

## 2021-09-07 RX ADMIN — Medication 650 MILLIGRAM(S): at 23:40

## 2021-09-07 RX ADMIN — ATORVASTATIN CALCIUM 40 MILLIGRAM(S): 80 TABLET, FILM COATED ORAL at 22:40

## 2021-09-07 RX ADMIN — Medication 20 MILLIGRAM(S): at 05:56

## 2021-09-07 RX ADMIN — LOSARTAN POTASSIUM 100 MILLIGRAM(S): 100 TABLET, FILM COATED ORAL at 05:56

## 2021-09-07 NOTE — PROGRESS NOTE ADULT - SUBJECTIVE AND OBJECTIVE BOX
Follow Up:  Toe Wounds    Interval History:  denies any fever.  occasional toe pain but none at the moment.  denies n/v/d.  Remainder of ROS otherwise negative.    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  Deep Vein Thrombosis (DVT) 17 years ago on Coumadin  CHF (congestive heart failure) last exacerbation in 1/2017  Enlarged prostate  GERD (gastroesophageal reflux disease)  Hernia umbilical  Calculus of bile duct without cholangitis or cholecystitis without obstruction  Atrial fibrillation  S/P Hernia Repair  S/P cataract surgery, unspecified laterality  S/P ERCP 3/2017    Allergies  No Known Allergies    ANTIMICROBIALS:    none    MEDICATIONS  (STANDING):  aspirin  chewable 81 daily  atorvastatin 40 at bedtime  buDESOnide    Inhalation Suspension 0.5 two times a day  finasteride 5 daily  furosemide    Tablet 20 two times a day  glucagon  Injectable 1 once  heparin  Infusion. 700 <Continuous>  insulin glargine Injectable (LANTUS) 25 at bedtime  insulin lispro (ADMELOG) corrective regimen sliding scale  three times a day before meals  insulin lispro (ADMELOG) corrective regimen sliding scale  at bedtime  levothyroxine 25 daily  losartan 100 daily  metolazone 2.5 <User Schedule>  metoprolol succinate ER 25 daily  montelukast 10 daily  pantoprazole    Tablet 40 before breakfast  tamsulosin 0.8 at bedtime    Vital Signs Last 24 Hrs  T(F): 97.8 (09-07-21 @ 07:05), Max: 98 (09-06-21 @ 13:47)  HR: 66 (09-07-21 @ 07:05)  BP: 111/74 (09-07-21 @ 07:05)  RR: 18 (09-07-21 @ 07:05)  SpO2: 97% (09-07-21 @ 07:05) (97% - 97%)    PHYSICAL EXAM:  Constitutional: non-toxic, in bed  HEAD/EYES: anicteric  ENT:  supple  Cardiovascular:   normal S1, S2,  Respiratory:  clear BS bilaterally  GI:  soft, distended but non-tender, normal bowel sounds  :  no hudson  Musculoskeletal:  right foot 4th toe with ulcer, toes 2-4 erythematous but not tender  Neurologic: awake and alert, grossly non-focal  Skin:  no rash, no erythema, no phlebitis  Psychiatric:  awake, alert, appropriate mood                        9.5    7.34  )-----------( 179      ( 07 Sep 2021 09:47 )             30.9 09-07    145  |  103  |  59  ----------------------------<  245  3.7   |  28  |  1.75  Ca    9.4      07 Sep 2021 09:47    Sedimentation Rate, Erythrocyte: 80 (09-04 @ 01:56)  C-Reactive Protein, Serum: 28 (09-02 @ 16:41)    MICROBIOLOGY:  Culture - Blood (collected 09-05-21 @ 10:17)  Source: .Blood Blood-Peripheral  Preliminary Report (09-06-21 @ 11:01):    No growth to date.    Culture - Blood (collected 09-05-21 @ 10:17)  Source: .Blood Blood-Peripheral  Preliminary Report (09-06-21 @ 11:01):    No growth to date.    Culture - Blood (collected 09-02-21 @ 21:00)  Source: .Blood Blood-Venous  Gram Stain (09-04-21 @ 01:16):    Growth in aerobic bottle: Gram Positive Cocci in Clusters  Final Report (09-04-21 @ 21:35):    Growth in aerobic bottle: Staphylococcus epidermidis    Coag Negative Staphylococcus    Single set isolate, possible contaminant. Contact    Microbiology if susceptibility testing clinically   indicated.  Organism: Blood Culture PCR (09-04-21 @ 21:35)      -  Staphylococcus epidermidis, Methicillin resistant: Detec      Method Type: PCR    Culture - Blood (collected 09-02-21 @ 19:02)  Source: .Blood Blood-Venous  Preliminary Report (09-03-21 @ 20:01):    No growth to date.    RADIOLOGY:  below radiology personally reviewed and agree with finding\    Xray Chest 1 View- PORTABLE-Routine (Xray Chest 1 View- PORTABLE-Routine in AM.) (09.05.21 @ 08:31) >  IMPRESSION:  New left cardiac device.  Atelectasis lung bases. No pleural effusion  Heart size cannot be accurately assessed in this projection.    Xray Foot AP + Lateral + Oblique, Right (09.02.21 @ 17:31) >  IMPRESSION:  Generalized osteopenia limits evaluation, however no visualized cortical bone erosion to suggest osteomyelitis.  No acute displaced fracture or dislocation.

## 2021-09-07 NOTE — PROGRESS NOTE ADULT - ASSESSMENT
86M with PMH of HTN, HLD, Afib on coumadin, CHF, T2DM on insulin, COPD, CVA, BPH p/w R toe pain with non healing ulcer c/f PAD.     Considering pt's medical condition and nature of the procedure, pt is at intermediate risk for the procedure. Pt is medically optimized for the procedure.

## 2021-09-07 NOTE — PHARMACOTHERAPY INTERVENTION NOTE - COMMENTS
85 yo M with DM A1C 7.4 on glipizide, meformin, and lantus 30 HS at home, currently admitted for R toe pain. Currently on lantus 25 units SQ HS and admelog low scale. BG in past 24 hours [id number], 286, 178, 312, 167. Recommend adding admelog 4 units SQ TID.    Wilder Bangura, PharmD, BCPS  [phone]  Available on [employer] Teams

## 2021-09-07 NOTE — CONSULT NOTE ADULT - ASSESSMENT
86M with PMH of HTN, HLD, Afib on coumadin, CHF, T2DM on insulin, COPD, CVA, BPH p/w R toe pain and ulcer after trauma.  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

## 2021-09-07 NOTE — PROGRESS NOTE ADULT - SUBJECTIVE AND OBJECTIVE BOX
Patient is a 86y old  Male who presents with a chief complaint of R toe pain (07 Sep 2021 13:17)      SUBJECTIVE / OVERNIGHT EVENTS:    Events noted.  CONSTITUTIONAL: No fever,  or fatigue  RESPIRATORY: No cough, wheezing,  No shortness of breath  CARDIOVASCULAR: No chest pain, palpitations,  GASTROINTESTINAL: No abdominal or epigastric pain.   NEUROLOGICAL: No headaches,     MEDICATIONS  (STANDING):  acetaminophen   Tablet .. 650 milliGRAM(s) Oral once  acetylcysteine  Oral Solution 1200 milliGRAM(s) Oral every 12 hours  aspirin  chewable 81 milliGRAM(s) Oral daily  atorvastatin 40 milliGRAM(s) Oral at bedtime  buDESOnide    Inhalation Suspension 0.5 milliGRAM(s) Inhalation two times a day  dextrose 40% Gel 15 Gram(s) Oral once  dextrose 5%. 1000 milliLiter(s) (50 mL/Hr) IV Continuous <Continuous>  dextrose 5%. 1000 milliLiter(s) (100 mL/Hr) IV Continuous <Continuous>  dextrose 50% Injectable 25 Gram(s) IV Push once  dextrose 50% Injectable 12.5 Gram(s) IV Push once  dextrose 50% Injectable 25 Gram(s) IV Push once  finasteride 5 milliGRAM(s) Oral daily  glucagon  Injectable 1 milliGRAM(s) IntraMuscular once  heparin  Infusion. 700 Unit(s)/Hr (7 mL/Hr) IV Continuous <Continuous>  insulin glargine Injectable (LANTUS) 25 Unit(s) SubCutaneous at bedtime  insulin lispro (ADMELOG) corrective regimen sliding scale   SubCutaneous three times a day before meals  insulin lispro (ADMELOG) corrective regimen sliding scale   SubCutaneous at bedtime  insulin lispro Injectable (ADMELOG) 4 Unit(s) SubCutaneous three times a day before meals  levothyroxine 25 MICROGram(s) Oral daily  metoprolol succinate ER 25 milliGRAM(s) Oral daily  montelukast 10 milliGRAM(s) Oral daily  mupirocin 2% Ointment 1 Application(s) Topical two times a day  pantoprazole    Tablet 40 milliGRAM(s) Oral before breakfast  sodium chloride 0.9%. 1000 milliLiter(s) (75 mL/Hr) IV Continuous <Continuous>  tamsulosin 0.8 milliGRAM(s) Oral at bedtime    MEDICATIONS  (PRN):  aluminum hydroxide/magnesium hydroxide/simethicone Suspension 30 milliLiter(s) Oral every 4 hours PRN Dyspepsia  heparin   Injectable 7000 Unit(s) IV Push every 6 hours PRN For aPTT less than 40  heparin   Injectable 3500 Unit(s) IV Push every 6 hours PRN For aPTT between 40 - 57  melatonin 3 milliGRAM(s) Oral at bedtime PRN Insomnia  ondansetron Injectable 4 milliGRAM(s) IV Push every 8 hours PRN Nausea and/or Vomiting        CAPILLARY BLOOD GLUCOSE      POCT Blood Glucose.: 239 mg/dL (07 Sep 2021 22:04)  POCT Blood Glucose.: 213 mg/dL (07 Sep 2021 17:51)  POCT Blood Glucose.: 189 mg/dL (07 Sep 2021 12:56)  POCT Blood Glucose.: 165 mg/dL (07 Sep 2021 08:23)    I&O's Summary    06 Sep 2021 07:01  -  07 Sep 2021 07:00  --------------------------------------------------------  IN: 0 mL / OUT: 400 mL / NET: -400 mL    07 Sep 2021 07:01  -  07 Sep 2021 23:35  --------------------------------------------------------  IN: 360 mL / OUT: 0 mL / NET: 360 mL        T(C): 36.7 (21 @ 21:32), Max: 36.7 (21 @ 21:32)  HR: 81 (21 @ 21:32) (66 - 81)  BP: 126/76 (21 @ 21:32) (111/74 - 126/76)  RR: 18 (21 @ 21:32) (18 - 18)  SpO2: 100% (21 @ 21:32) (97% - 100%)    PHYSICAL EXAM:  GENERAL: NAD  NECK: Supple, No JVD  CHEST/LUNG: Clear to auscultation bilaterally; No wheezing.  HEART: Regular rate and rhythm; No murmurs, rubs, or gallops  ABDOMEN: Soft, Nontender, Nondistended; Bowel sounds present  EXTREMITIES:  Redness toes  NEUROLOGY: AAO       LABS:                        9.5    7.34  )-----------( 179      ( 07 Sep 2021 09:47 )             30.9     -    145  |  103  |  59<H>  ----------------------------<  245<H>  3.7   |  28  |  1.75<H>    Ca    9.4      07 Sep 2021 09:47      PTT - ( 07 Sep 2021 16:45 )  PTT:64.8 sec      Urinalysis Basic - ( 07 Sep 2021 17:24 )    Color: Light Yellow / Appearance: Clear / S.016 / pH: x  Gluc: x / Ketone: Negative  / Bili: Negative / Urobili: Negative   Blood: x / Protein: Negative / Nitrite: Negative   Leuk Esterase: Negative / RBC: x / WBC x   Sq Epi: x / Non Sq Epi: x / Bacteria: x      CAPILLARY BLOOD GLUCOSE      POCT Blood Glucose.: 239 mg/dL (07 Sep 2021 22:04)  POCT Blood Glucose.: 213 mg/dL (07 Sep 2021 17:51)  POCT Blood Glucose.: 189 mg/dL (07 Sep 2021 12:56)  POCT Blood Glucose.: 165 mg/dL (07 Sep 2021 08:23)        RADIOLOGY & ADDITIONAL TESTS:    Imaging Personally Reviewed:    Consultant(s) Notes Reviewed:      Care Discussed with Consultants/Other Providers:    Graham Pulliam MD, CMD, FACP    [address] [place] [address]  Office Tel: [phone]  Cell: [phone]

## 2021-09-07 NOTE — CONSULT NOTE ADULT - SUBJECTIVE AND OBJECTIVE BOX
[facility] Consult Note Nephrology - CONSULTATION NOTE    86M with PMH of HTN, HLD, Afib on coumadin, CHF, T2DM on insulin, COPD, CVA, BPH p/w R toe pain and ulcer. Pt states he has been having weeks of R great toe pain with discoloration - pain is worse at night, severe, disturbs his sleep, located only along R great toe without radiation; has noted all his toes have been more red during the same time, but denies any pain in his other toes or L foot. Pain is well controlled during the day with tylenol and pt is still able to ambulate as normal. A couple of week ago, pt wife stepped on his toe which led to a blister on his 4th R toe; went to podiatry who lanced the blister but it has not been healing well despite local wound care; denies any significant pain or drainage. Pt endorses intact sensation in b/l LE. Went to see vascular Dr Moy in the office and was referred to ED for c/f PAD and possible gangrene.     Denies any associated fevers, chills, nausea/vomiting, LE swelling outside his baseline, no calf pain, no other skin changes. Rest of ROS negative.    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    Deep Vein Thrombosis (DVT)  17 yeras ago on Coumadin    CHF (congestive heart failure)  last exacerbation in 1/2017    Enlarged prostate    GERD (gastroesophageal reflux disease)    Hernia  umblical    Calculus of bile duct without cholangitis or cholecystitis without obstruction    Atrial fibrillation    S/P Hernia Repair    S/P cataract surgery, unspecified laterality    S/P ERCP  3/2017      No Known Allergies    Home Medications Reviewed  Hospital Medications:   MEDICATIONS  (STANDING):  aspirin  chewable 81 milliGRAM(s) Oral daily  atorvastatin 40 milliGRAM(s) Oral at bedtime  buDESOnide    Inhalation Suspension 0.5 milliGRAM(s) Inhalation two times a day  dextrose 40% Gel 15 Gram(s) Oral once  dextrose 5%. 1000 milliLiter(s) (50 mL/Hr) IV Continuous <Continuous>  dextrose 5%. 1000 milliLiter(s) (100 mL/Hr) IV Continuous <Continuous>  dextrose 50% Injectable 25 Gram(s) IV Push once  dextrose 50% Injectable 12.5 Gram(s) IV Push once  dextrose 50% Injectable 25 Gram(s) IV Push once  finasteride 5 milliGRAM(s) Oral daily  furosemide    Tablet 20 milliGRAM(s) Oral two times a day  glucagon  Injectable 1 milliGRAM(s) IntraMuscular once  heparin  Infusion. 700 Unit(s)/Hr (7 mL/Hr) IV Continuous <Continuous>  insulin glargine Injectable (LANTUS) 25 Unit(s) SubCutaneous at bedtime  insulin lispro (ADMELOG) corrective regimen sliding scale   SubCutaneous three times a day before meals  insulin lispro (ADMELOG) corrective regimen sliding scale   SubCutaneous at bedtime  insulin lispro Injectable (ADMELOG) 4 Unit(s) SubCutaneous three times a day before meals  levothyroxine 25 MICROGram(s) Oral daily  losartan 100 milliGRAM(s) Oral daily  metolazone 2.5 milliGRAM(s) Oral <User Schedule>  metoprolol succinate ER 25 milliGRAM(s) Oral daily  montelukast 10 milliGRAM(s) Oral daily  mupirocin 2% Ointment 1 Application(s) Topical two times a day  pantoprazole    Tablet 40 milliGRAM(s) Oral before breakfast  tamsulosin 0.8 milliGRAM(s) Oral at bedtime    SOCIAL HISTORY:  Denies ETOh,Smoking,   FAMILY HISTORY:  No pertinent family history in first degree relatives      REVIEW OF SYSTEMS:  CONSTITUTIONAL: No weakness, fevers or chills  EYES/ENT: No visual changes;  No vertigo or throat pain   NECK: No pain or stiffness  RESPIRATORY: No cough, wheezing, hemoptysis; No shortness of breath  CARDIOVASCULAR: No chest pain or palpitations.  GASTROINTESTINAL: No abdominal or epigastric pain. No nausea, vomiting, or hematemesis; No diarrhea or constipation. No melena or hematochezia.  GENITOURINARY: No dysuria, frequency, foamy urine, urinary urgency, incontinence or hematuria  NEUROLOGICAL: No numbness or weakness  SKIN: No itching, burning, rashes, or lesions   VASCULAR: No bilateral lower extremity edema.   All other review of systems is negative unless indicated above.    VITALS:  T(F): 97.7 (09-07-21 @ 11:30), Max: 98 (09-06-21 @ 13:47)  HR: 80 (09-07-21 @ 11:30)  BP: 115/67 (09-07-21 @ 11:30)  RR: 18 (09-07-21 @ 11:30)  SpO2: 100% (09-07-21 @ 11:30)  Wt(kg): --    09-06 @ 07:01  -  09-07 @ 07:00  --------------------------------------------------------  IN: 0 mL / OUT: 400 mL / NET: -400 mL    09-07 @ 07:01  -  09-07 @ 13:17  --------------------------------------------------------  IN: 120 mL / OUT: 0 mL / NET: 120 mL        Weight (kg): 89.4 (09-07 @ 02:11)  PHYSICAL EXAM:  Constitutional: NAD  HEENT: hard of hearing  Neck: No JVD  Respiratory: CTAB, no wheezes, rales or rhonchi  Cardiovascular: S1, S2, RRR  Gastrointestinal: BS+, soft, NT/ND  Extremities: +peripheral edema  Neurological: A/O x 3, no focal deficits  Psychiatric: Normal mood, normal affect  : No CVA tenderness. No hudson.       LABS:  09-07    145  |  103  |  59<H>  ----------------------------<  245<H>  3.7   |  28  |  1.75<H>    Ca    9.4      07 Sep 2021 09:47      Creatinine Trend: 1.75 <--, 1.98 <--, 1.72 <--, 1.51 <--, 1.65 <--, 1.44 <--, 1.81 <--                        9.5    7.34  )-----------( 179      ( 07 Sep 2021 09:47 )             30.9     Urine Studies:      RADIOLOGY & ADDITIONAL STUDIES:

## 2021-09-07 NOTE — PROGRESS NOTE ADULT - ASSESSMENT
86M with PMH of HTN, HLD, Afib on coumadin, CHF, T2DM on insulin, COPD, CVA, BPH p/w R toe pain and ulcer after trauma.  Xray negative for OM.  No fever.  No drainage.  Stable off antimicrobials     Toe Ulcer  - xray negative  - no definite infection  - monitor off antimicrobials  - podiatry f/u    Positive Blood Culture (Staph epi)  - Likely procurement contaminant  - repeat BCx negative

## 2021-09-08 ENCOUNTER — NON-APPOINTMENT (OUTPATIENT)
Age: 86
End: 2021-09-08

## 2021-09-08 LAB
ANION GAP SERPL CALC-SCNC: 12 MMOL/L — SIGNIFICANT CHANGE UP (ref 5–17)
APTT BLD: 55.3 SEC — HIGH (ref 27.5–35.5)
APTT BLD: >200 SEC — CRITICAL HIGH (ref 27.5–35.5)
BLD GP AB SCN SERPL QL: NEGATIVE — SIGNIFICANT CHANGE UP
BUN SERPL-MCNC: 58 MG/DL — HIGH (ref 7–23)
CALCIUM SERPL-MCNC: 9.7 MG/DL — SIGNIFICANT CHANGE UP (ref 8.4–10.5)
CHLORIDE SERPL-SCNC: 104 MMOL/L — SIGNIFICANT CHANGE UP (ref 96–108)
CO2 SERPL-SCNC: 29 MMOL/L — SIGNIFICANT CHANGE UP (ref 22–31)
CREAT SERPL-MCNC: 1.6 MG/DL — HIGH (ref 0.5–1.3)
GAS PNL BLDA: SIGNIFICANT CHANGE UP
GLUCOSE BLDC GLUCOMTR-MCNC: 211 MG/DL — HIGH (ref 70–99)
GLUCOSE BLDC GLUCOMTR-MCNC: 358 MG/DL — HIGH (ref 70–99)
GLUCOSE BLDC GLUCOMTR-MCNC: 364 MG/DL — HIGH (ref 70–99)
GLUCOSE SERPL-MCNC: 190 MG/DL — HIGH (ref 70–99)
HCT VFR BLD CALC: 30.1 % — LOW (ref 39–50)
HGB BLD-MCNC: 9.4 G/DL — LOW (ref 13–17)
INR BLD: 1.3 RATIO — HIGH (ref 0.88–1.16)
MAGNESIUM SERPL-MCNC: 2.2 MG/DL — SIGNIFICANT CHANGE UP (ref 1.6–2.6)
MCHC RBC-ENTMCNC: 28.3 PG — SIGNIFICANT CHANGE UP (ref 27–34)
MCHC RBC-ENTMCNC: 31.2 GM/DL — LOW (ref 32–36)
MCV RBC AUTO: 90.7 FL — SIGNIFICANT CHANGE UP (ref 80–100)
NRBC # BLD: 0 /100 WBCS — SIGNIFICANT CHANGE UP (ref 0–0)
PHOSPHATE SERPL-MCNC: 3.1 MG/DL — SIGNIFICANT CHANGE UP (ref 2.5–4.5)
PLATELET # BLD AUTO: 188 K/UL — SIGNIFICANT CHANGE UP (ref 150–400)
POTASSIUM SERPL-MCNC: 3.7 MMOL/L — SIGNIFICANT CHANGE UP (ref 3.5–5.3)
POTASSIUM SERPL-SCNC: 3.7 MMOL/L — SIGNIFICANT CHANGE UP (ref 3.5–5.3)
PROTHROM AB SERPL-ACNC: 15.4 SEC — HIGH (ref 10.6–13.6)
RBC # BLD: 3.32 M/UL — LOW (ref 4.2–5.8)
RBC # FLD: 15.2 % — HIGH (ref 10.3–14.5)
RH IG SCN BLD-IMP: POSITIVE — SIGNIFICANT CHANGE UP
SODIUM SERPL-SCNC: 145 MMOL/L — SIGNIFICANT CHANGE UP (ref 135–145)
WBC # BLD: 8.05 K/UL — SIGNIFICANT CHANGE UP (ref 3.8–10.5)
WBC # FLD AUTO: 8.05 K/UL — SIGNIFICANT CHANGE UP (ref 3.8–10.5)

## 2021-09-08 PROCEDURE — 99231 SBSQ HOSP IP/OBS SF/LOW 25: CPT

## 2021-09-08 PROCEDURE — 75710 ARTERY X-RAYS ARM/LEG: CPT | Mod: 26,RT

## 2021-09-08 PROCEDURE — 93923 UPR/LXTR ART STDY 3+ LVLS: CPT | Mod: 26

## 2021-09-08 PROCEDURE — 36246 INS CATH ABD/L-EXT ART 2ND: CPT

## 2021-09-08 PROCEDURE — 75625 CONTRAST EXAM ABDOMINL AORTA: CPT | Mod: 26

## 2021-09-08 RX ORDER — MUPIROCIN 20 MG/G
1 OINTMENT TOPICAL
Refills: 0 | Status: DISCONTINUED | OUTPATIENT
Start: 2021-09-08 | End: 2021-09-15

## 2021-09-08 RX ORDER — SODIUM CHLORIDE 9 MG/ML
1000 INJECTION INTRAMUSCULAR; INTRAVENOUS; SUBCUTANEOUS
Refills: 0 | Status: DISCONTINUED | OUTPATIENT
Start: 2021-09-08 | End: 2021-09-15

## 2021-09-08 RX ORDER — ONDANSETRON 8 MG/1
4 TABLET, FILM COATED ORAL EVERY 8 HOURS
Refills: 0 | Status: DISCONTINUED | OUTPATIENT
Start: 2021-09-08 | End: 2021-09-15

## 2021-09-08 RX ORDER — ATORVASTATIN CALCIUM 80 MG/1
40 TABLET, FILM COATED ORAL AT BEDTIME
Refills: 0 | Status: DISCONTINUED | OUTPATIENT
Start: 2021-09-08 | End: 2021-09-15

## 2021-09-08 RX ORDER — LEVOTHYROXINE SODIUM 125 MCG
25 TABLET ORAL DAILY
Refills: 0 | Status: DISCONTINUED | OUTPATIENT
Start: 2021-09-08 | End: 2021-09-15

## 2021-09-08 RX ORDER — DEXTROSE 50 % IN WATER 50 %
12.5 SYRINGE (ML) INTRAVENOUS ONCE
Refills: 0 | Status: DISCONTINUED | OUTPATIENT
Start: 2021-09-08 | End: 2021-09-15

## 2021-09-08 RX ORDER — ACETYLCYSTEINE 200 MG/ML
1200 VIAL (ML) MISCELLANEOUS EVERY 12 HOURS
Refills: 0 | Status: COMPLETED | OUTPATIENT
Start: 2021-09-08 | End: 2021-09-09

## 2021-09-08 RX ORDER — ACETAMINOPHEN 500 MG
650 TABLET ORAL ONCE
Refills: 0 | Status: COMPLETED | OUTPATIENT
Start: 2021-09-08 | End: 2021-09-08

## 2021-09-08 RX ORDER — ACETAMINOPHEN 500 MG
650 TABLET ORAL EVERY 6 HOURS
Refills: 0 | Status: DISCONTINUED | OUTPATIENT
Start: 2021-09-08 | End: 2021-09-15

## 2021-09-08 RX ORDER — SODIUM CHLORIDE 9 MG/ML
1000 INJECTION INTRAMUSCULAR; INTRAVENOUS; SUBCUTANEOUS
Refills: 0 | Status: COMPLETED | OUTPATIENT
Start: 2021-09-08

## 2021-09-08 RX ORDER — ACETYLCYSTEINE 200 MG/ML
1200 VIAL (ML) MISCELLANEOUS EVERY 12 HOURS
Refills: 0 | Status: COMPLETED | OUTPATIENT
Start: 2021-09-08

## 2021-09-08 RX ORDER — LANOLIN ALCOHOL/MO/W.PET/CERES
3 CREAM (GRAM) TOPICAL AT BEDTIME
Refills: 0 | Status: DISCONTINUED | OUTPATIENT
Start: 2021-09-08 | End: 2021-09-15

## 2021-09-08 RX ORDER — HEPARIN SODIUM 5000 [USP'U]/ML
700 INJECTION INTRAVENOUS; SUBCUTANEOUS
Qty: 25000 | Refills: 0 | Status: DISCONTINUED | OUTPATIENT
Start: 2021-09-08 | End: 2021-09-09

## 2021-09-08 RX ORDER — BUDESONIDE, MICRONIZED 100 %
0.5 POWDER (GRAM) MISCELLANEOUS
Refills: 0 | Status: DISCONTINUED | OUTPATIENT
Start: 2021-09-08 | End: 2021-09-15

## 2021-09-08 RX ORDER — INSULIN LISPRO 100/ML
VIAL (ML) SUBCUTANEOUS AT BEDTIME
Refills: 0 | Status: DISCONTINUED | OUTPATIENT
Start: 2021-09-08 | End: 2021-09-15

## 2021-09-08 RX ORDER — MONTELUKAST 4 MG/1
10 TABLET, CHEWABLE ORAL DAILY
Refills: 0 | Status: DISCONTINUED | OUTPATIENT
Start: 2021-09-08 | End: 2021-09-15

## 2021-09-08 RX ORDER — INSULIN LISPRO 100/ML
4 VIAL (ML) SUBCUTANEOUS
Refills: 0 | Status: DISCONTINUED | OUTPATIENT
Start: 2021-09-08 | End: 2021-09-09

## 2021-09-08 RX ORDER — INSULIN LISPRO 100/ML
VIAL (ML) SUBCUTANEOUS
Refills: 0 | Status: DISCONTINUED | OUTPATIENT
Start: 2021-09-08 | End: 2021-09-15

## 2021-09-08 RX ORDER — DEXTROSE 50 % IN WATER 50 %
25 SYRINGE (ML) INTRAVENOUS ONCE
Refills: 0 | Status: DISCONTINUED | OUTPATIENT
Start: 2021-09-08 | End: 2021-09-15

## 2021-09-08 RX ORDER — METOPROLOL TARTRATE 50 MG
25 TABLET ORAL DAILY
Refills: 0 | Status: DISCONTINUED | OUTPATIENT
Start: 2021-09-08 | End: 2021-09-15

## 2021-09-08 RX ORDER — INSULIN GLARGINE 100 [IU]/ML
25 INJECTION, SOLUTION SUBCUTANEOUS AT BEDTIME
Refills: 0 | Status: DISCONTINUED | OUTPATIENT
Start: 2021-09-08 | End: 2021-09-13

## 2021-09-08 RX ORDER — FINASTERIDE 5 MG/1
5 TABLET, FILM COATED ORAL DAILY
Refills: 0 | Status: DISCONTINUED | OUTPATIENT
Start: 2021-09-08 | End: 2021-09-15

## 2021-09-08 RX ORDER — TAMSULOSIN HYDROCHLORIDE 0.4 MG/1
0.8 CAPSULE ORAL AT BEDTIME
Refills: 0 | Status: DISCONTINUED | OUTPATIENT
Start: 2021-09-08 | End: 2021-09-15

## 2021-09-08 RX ORDER — PANTOPRAZOLE SODIUM 20 MG/1
40 TABLET, DELAYED RELEASE ORAL
Refills: 0 | Status: DISCONTINUED | OUTPATIENT
Start: 2021-09-08 | End: 2021-09-15

## 2021-09-08 RX ADMIN — MUPIROCIN 1 APPLICATION(S): 20 OINTMENT TOPICAL at 18:09

## 2021-09-08 RX ADMIN — PANTOPRAZOLE SODIUM 40 MILLIGRAM(S): 20 TABLET, DELAYED RELEASE ORAL at 06:18

## 2021-09-08 RX ADMIN — Medication 650 MILLIGRAM(S): at 00:10

## 2021-09-08 RX ADMIN — SODIUM CHLORIDE 75 MILLILITER(S): 9 INJECTION INTRAMUSCULAR; INTRAVENOUS; SUBCUTANEOUS at 06:29

## 2021-09-08 RX ADMIN — Medication 0.5 MILLIGRAM(S): at 06:07

## 2021-09-08 RX ADMIN — TAMSULOSIN HYDROCHLORIDE 0.8 MILLIGRAM(S): 0.4 CAPSULE ORAL at 22:15

## 2021-09-08 RX ADMIN — Medication 4 UNIT(S): at 18:09

## 2021-09-08 RX ADMIN — MONTELUKAST 10 MILLIGRAM(S): 4 TABLET, CHEWABLE ORAL at 15:28

## 2021-09-08 RX ADMIN — Medication 2: at 13:04

## 2021-09-08 RX ADMIN — INSULIN GLARGINE 25 UNIT(S): 100 INJECTION, SOLUTION SUBCUTANEOUS at 22:13

## 2021-09-08 RX ADMIN — Medication 650 MILLIGRAM(S): at 22:44

## 2021-09-08 RX ADMIN — Medication 25 MILLIGRAM(S): at 06:04

## 2021-09-08 RX ADMIN — Medication 5: at 18:08

## 2021-09-08 RX ADMIN — MUPIROCIN 1 APPLICATION(S): 20 OINTMENT TOPICAL at 06:24

## 2021-09-08 RX ADMIN — Medication 1200 MILLIGRAM(S): at 18:10

## 2021-09-08 RX ADMIN — Medication 650 MILLIGRAM(S): at 15:27

## 2021-09-08 RX ADMIN — HEPARIN SODIUM 700 UNIT(S)/HR: 5000 INJECTION INTRAVENOUS; SUBCUTANEOUS at 18:21

## 2021-09-08 RX ADMIN — Medication 0.5 MILLIGRAM(S): at 18:08

## 2021-09-08 RX ADMIN — Medication 25 MICROGRAM(S): at 06:04

## 2021-09-08 RX ADMIN — ATORVASTATIN CALCIUM 40 MILLIGRAM(S): 80 TABLET, FILM COATED ORAL at 22:16

## 2021-09-08 RX ADMIN — Medication 650 MILLIGRAM(S): at 16:27

## 2021-09-08 RX ADMIN — HEPARIN SODIUM 1100 UNIT(S)/HR: 5000 INJECTION INTRAVENOUS; SUBCUTANEOUS at 01:22

## 2021-09-08 RX ADMIN — Medication 650 MILLIGRAM(S): at 06:03

## 2021-09-08 RX ADMIN — HEPARIN SODIUM 3500 UNIT(S): 5000 INJECTION INTRAVENOUS; SUBCUTANEOUS at 01:25

## 2021-09-08 RX ADMIN — Medication 1200 MILLIGRAM(S): at 06:21

## 2021-09-08 RX ADMIN — FINASTERIDE 5 MILLIGRAM(S): 5 TABLET, FILM COATED ORAL at 15:28

## 2021-09-08 RX ADMIN — Medication 650 MILLIGRAM(S): at 06:33

## 2021-09-08 RX ADMIN — Medication 650 MILLIGRAM(S): at 22:14

## 2021-09-08 RX ADMIN — Medication 3: at 22:12

## 2021-09-08 NOTE — PROVIDER CONTACT NOTE (CRITICAL VALUE NOTIFICATION) - ASSESSMENT
Pt is A/Ox3. VS are WDL.
Patient alert and oriented in no distress
Pt is alert, VS are stable, Heparin drip at 12cc/hr, no acute distress noted
pt A&Ox4, VS WDL, no c/o pain, afebrile

## 2021-09-08 NOTE — PROGRESS NOTE ADULT - SUBJECTIVE AND OBJECTIVE BOX
Patient is a 86y old  Male who presents with a chief complaint of R toe pain (08 Sep 2021 14:01)      SUBJECTIVE / OVERNIGHT EVENTS:    Events noted.  CONSTITUTIONAL: S/p Diagnostic angio  RESPIRATORY: No cough, wheezing,  No shortness of breath  CARDIOVASCULAR: No chest pain, palpitations, dizziness, or leg swelling  GASTROINTESTINAL: No abdominal or epigastric pain. No nausea, vomiting.  NEUROLOGICAL: No headaches,     MEDICATIONS  (STANDING):  acetylcysteine  Oral Solution 1200 milliGRAM(s) Oral every 12 hours  atorvastatin 40 milliGRAM(s) Oral at bedtime  buDESOnide    Inhalation Suspension 0.5 milliGRAM(s) Inhalation two times a day  dextrose 50% Injectable 25 Gram(s) IV Push once  dextrose 50% Injectable 12.5 Gram(s) IV Push once  finasteride 5 milliGRAM(s) Oral daily  heparin  Infusion. 700 Unit(s)/Hr (7 mL/Hr) IV Continuous <Continuous>  insulin glargine Injectable (LANTUS) 25 Unit(s) SubCutaneous at bedtime  insulin lispro (ADMELOG) corrective regimen sliding scale   SubCutaneous at bedtime  insulin lispro (ADMELOG) corrective regimen sliding scale   SubCutaneous three times a day before meals  insulin lispro Injectable (ADMELOG) 4 Unit(s) SubCutaneous three times a day before meals  levothyroxine 25 MICROGram(s) Oral daily  metoprolol succinate ER 25 milliGRAM(s) Oral daily  montelukast 10 milliGRAM(s) Oral daily  mupirocin 2% Ointment 1 Application(s) Topical two times a day  pantoprazole    Tablet 40 milliGRAM(s) Oral before breakfast  sodium chloride 0.9%. 1000 milliLiter(s) (75 mL/Hr) IV Continuous <Continuous>  tamsulosin 0.8 milliGRAM(s) Oral at bedtime    MEDICATIONS  (PRN):  acetaminophen   Tablet .. 650 milliGRAM(s) Oral every 6 hours PRN Mild Pain (1 - 3)  aluminum hydroxide/magnesium hydroxide/simethicone Suspension 30 milliLiter(s) Oral every 4 hours PRN Dyspepsia  melatonin 3 milliGRAM(s) Oral at bedtime PRN Insomnia  ondansetron Injectable 4 milliGRAM(s) IV Push every 8 hours PRN Nausea and/or Vomiting        CAPILLARY BLOOD GLUCOSE      POCT Blood Glucose.: 358 mg/dL (08 Sep 2021 21:44)  POCT Blood Glucose.: 364 mg/dL (08 Sep 2021 17:41)  POCT Blood Glucose.: 211 mg/dL (08 Sep 2021 12:24)    I&O's Summary    07 Sep 2021 07:01  -  08 Sep 2021 07:00  --------------------------------------------------------  IN: 360 mL / OUT: 0 mL / NET: 360 mL    08 Sep 2021 07:01  -  08 Sep 2021 23:37  --------------------------------------------------------  IN: 0 mL / OUT: 350 mL / NET: -350 mL        T(C): 36.5 (21 @ 20:57), Max: 36.7 (21 @ 07:34)  HR: 80 (21 @ 20:57) (68 - 83)  BP: 111/64 (21 @ 20:57) (106/64 - 151/67)  RR: 17 (21 @ 20:57) (16 - 18)  SpO2: 96% (21 @ 20:57) (93% - 100%)    PHYSICAL EXAM:    NECK: Supple, No JVD  CHEST/LUNG: Clear to auscultation bilaterally; No wheezing.  HEART: Regular rate and rhythm; No murmurs, rubs, or gallops  ABDOMEN: Soft, Nontender, Nondistended; Bowel sounds present  EXTREMITIES: Redness feet  NEUROLOGY: AAO X 3      LABS:                        9.4    8.05  )-----------( 188      ( 08 Sep 2021 05:17 )             30.1         145  |  104  |  58<H>  ----------------------------<  190<H>  3.7   |  29  |  1.60<H>    Ca    9.7      08 Sep 2021 05:17  Phos  3.1     09-08  Mg     2.2     09-08      PT/INR - ( 08 Sep 2021 05:17 )   PT: 15.4 sec;   INR: 1.30 ratio         PTT - ( 08 Sep 2021 05:17 )  PTT:>200.0 sec      Urinalysis Basic - ( 07 Sep 2021 17:24 )    Color: Light Yellow / Appearance: Clear / S.016 / pH: x  Gluc: x / Ketone: Negative  / Bili: Negative / Urobili: Negative   Blood: x / Protein: Negative / Nitrite: Negative   Leuk Esterase: Negative / RBC: x / WBC x   Sq Epi: x / Non Sq Epi: x / Bacteria: x      CAPILLARY BLOOD GLUCOSE      POCT Blood Glucose.: 358 mg/dL (08 Sep 2021 21:44)  POCT Blood Glucose.: 364 mg/dL (08 Sep 2021 17:41)  POCT Blood Glucose.: 211 mg/dL (08 Sep 2021 12:24)        RADIOLOGY & ADDITIONAL TESTS:    Imaging Personally Reviewed:    Consultant(s) Notes Reviewed:      Care Discussed with Consultants/Other Providers:    Graham Pulliam MD, CMD, FACP    [address] [place] [address]  Office Tel: [phone]  Cell: [phone]

## 2021-09-08 NOTE — BRIEF OPERATIVE NOTE - NSICDXBRIEFPROCEDURE_GEN_ALL_CORE_FT
PROCEDURES:  Selective catheterization of first order branch of artery of lower extremity 08-Sep-2021 10:24:54  Jane Quan

## 2021-09-08 NOTE — PROGRESS NOTE ADULT - SUBJECTIVE AND OBJECTIVE BOX
Patient seen and examined  no complaints  In PACU s/p lower ext angio    No Known Allergies    Hospital Medications:   MEDICATIONS  (STANDING):  acetylcysteine  Oral Solution 1200 milliGRAM(s) Oral every 12 hours  atorvastatin 40 milliGRAM(s) Oral at bedtime  buDESOnide    Inhalation Suspension 0.5 milliGRAM(s) Inhalation two times a day  dextrose 50% Injectable 12.5 Gram(s) IV Push once  dextrose 50% Injectable 25 Gram(s) IV Push once  finasteride 5 milliGRAM(s) Oral daily  insulin glargine Injectable (LANTUS) 25 Unit(s) SubCutaneous at bedtime  insulin lispro (ADMELOG) corrective regimen sliding scale   SubCutaneous at bedtime  insulin lispro (ADMELOG) corrective regimen sliding scale   SubCutaneous three times a day before meals  insulin lispro Injectable (ADMELOG) 4 Unit(s) SubCutaneous three times a day before meals  levothyroxine 25 MICROGram(s) Oral daily  metoprolol succinate ER 25 milliGRAM(s) Oral daily  montelukast 10 milliGRAM(s) Oral daily  mupirocin 2% Ointment 1 Application(s) Topical two times a day  pantoprazole    Tablet 40 milliGRAM(s) Oral before breakfast  sodium chloride 0.9%. 1000 milliLiter(s) (75 mL/Hr) IV Continuous <Continuous>  tamsulosin 0.8 milliGRAM(s) Oral at bedtime        VITALS:  T(F): 96.8 (21 @ 10:03), Max: 98.1 (21 @ 07:34)  HR: 72 (21 @ 11:00)  BP: 142/64 (21 @ 10:30)  RR: 16 (21 @ 11:00)  SpO2: 96% (21 @ 11:00)  Wt(kg): --     @ 07:01  -   07:00  --------------------------------------------------------  IN: 360 mL / OUT: 0 mL / NET: 360 mL      Height (cm): 172.7 ( @ 07:32)  Weight (kg): 89.4 ( @ 07:32)  BMI (kg/m2): 30 ( @ 07:32)  BSA (m2): 2.03 ( @ 07:32)  PHYSICAL EXAM:  Constitutional: NAD  HEENT: hard of hearing  Neck: No JVD  Respiratory: CTAB, no wheezes, rales or rhonchi  Cardiovascular: S1, S2, RRR  Gastrointestinal: BS+, soft, NT/ND  Extremities: +peripheral edema  Neurological: A/O x 3, no focal deficits  Psychiatric: Normal mood, normal affect  : No CVA tenderness. No hudson.   LABS:      145  |  104  |  58<H>  ----------------------------<  190<H>  3.7   |  29  |  1.60<H>    Ca    9.7      08 Sep 2021 05:17  Phos  3.1       Mg     2.2           Creatinine Trend: 1.60 <--, 1.75 <--, 1.98 <--, 1.72 <--, 1.51 <--, 1.65 <--, 1.44 <--, 1.81 <--                        9.4    8.05  )-----------( 188      ( 08 Sep 2021 05:17 )             30.1     Urine Studies:  Urinalysis Basic - ( 07 Sep 2021 17:24 )    Color: Light Yellow / Appearance: Clear / S.016 / pH:   Gluc:  / Ketone: Negative  / Bili: Negative / Urobili: Negative   Blood:  / Protein: Negative / Nitrite: Negative   Leuk Esterase: Negative / RBC:  / WBC    Sq Epi:  / Non Sq Epi:  / Bacteria:       Creatinine, Random Urine: 60 mg/dL ( @ 17:26)  Osmolality, Random Urine: 478 mos/kg ( @ 17:26)  Sodium, Random Urine: 65 mmol/L ( @ 17:26)  Chloride, Random Urine: 57 mmol/L ( @ 17:26)    RADIOLOGY & ADDITIONAL STUDIES:

## 2021-09-08 NOTE — PROGRESS NOTE ADULT - SUBJECTIVE AND OBJECTIVE BOX
Patient is a 86y old  Male who presents with a chief complaint of R toe pain (08 Sep 2021 12:32)    Being followed by ID for        Interval history:  No other acute events      ROS:  No cough,SOB,CP  No N/V/D  No abd pain  No urinary complaints  No HA  No joint or limb pain  No other complaints    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    Chronic Obstructive Pulmonary Disease (COPD)    Obstructive Sleep Apnea    Mycobacterium Avium-Intracellulare Infection  2009    Deep Vein Thrombosis (DVT)  17 yeras ago on Coumadin    CHF (congestive heart failure)  last exacerbation in 2017    Enlarged prostate    GERD (gastroesophageal reflux disease)    Hernia  umblical    Calculus of bile duct without cholangitis or cholecystitis without obstruction    Atrial fibrillation    S/P Hernia Repair    S/P cataract surgery, unspecified laterality    S/P ERCP  3/2017      Allergies    No Known Allergies    Intolerances      Antimicrobials:      MEDICATIONS  (STANDING):  acetylcysteine  Oral Solution 1200 milliGRAM(s) Oral every 12 hours  atorvastatin 40 milliGRAM(s) Oral at bedtime  buDESOnide    Inhalation Suspension 0.5 milliGRAM(s) Inhalation two times a day  dextrose 50% Injectable 25 Gram(s) IV Push once  dextrose 50% Injectable 12.5 Gram(s) IV Push once  finasteride 5 milliGRAM(s) Oral daily  insulin glargine Injectable (LANTUS) 25 Unit(s) SubCutaneous at bedtime  insulin lispro (ADMELOG) corrective regimen sliding scale   SubCutaneous at bedtime  insulin lispro (ADMELOG) corrective regimen sliding scale   SubCutaneous three times a day before meals  insulin lispro Injectable (ADMELOG) 4 Unit(s) SubCutaneous three times a day before meals  levothyroxine 25 MICROGram(s) Oral daily  metoprolol succinate ER 25 milliGRAM(s) Oral daily  montelukast 10 milliGRAM(s) Oral daily  mupirocin 2% Ointment 1 Application(s) Topical two times a day  pantoprazole    Tablet 40 milliGRAM(s) Oral before breakfast  sodium chloride 0.9%. 1000 milliLiter(s) (75 mL/Hr) IV Continuous <Continuous>  tamsulosin 0.8 milliGRAM(s) Oral at bedtime      Vital Signs Last 24 Hrs  T(C): 36.1 (21 @ 13:30), Max: 36.7 (21 @ 21:32)  T(F): 97 (21 @ 13:30), Max: 98.1 (21 @ 07:34)  HR: 74 (21 @ 13:30) (68 - 83)  BP: 151/67 (21 @ 13:30) (106/64 - 151/67)  BP(mean): 96 (21 @ 13:30) (94 - 97)  RR: 16 (21 @ 13:30) (16 - 18)  SpO2: 97% (21 @ 13:30) (93% - 100%)    Physical Exam:    Constitutional well preserved,comfortable,pleasant    HEENT PERRLA EOMI,No pallor or icterus    No oral exudate or erythema    Neck supple no JVD or LN    Chest Good AE,CTA    CVS RRR S1 S2 WNl No murmur or rub or gallop    Abd soft BS normal No tenderness no masses    Ext No cyanosis clubbing or edema    IV site no erythema tenderness or discharge    Joints no swelling or LOM    CNS AAO X 3 no focal    Lab Data:                          9.4    8.05  )-----------( 188      ( 08 Sep 2021 05:17 )             30.1           145  |  104  |  58<H>  ----------------------------<  190<H>  3.7   |  29  |  1.60<H>    Ca    9.7      08 Sep 2021 05:17  Phos  3.1       Mg     2.2             Urinalysis Basic - ( 07 Sep 2021 17:24 )    Color: Light Yellow / Appearance: Clear / S.016 / pH: x  Gluc: x / Ketone: Negative  / Bili: Negative / Urobili: Negative   Blood: x / Protein: Negative / Nitrite: Negative   Leuk Esterase: Negative / RBC: x / WBC x   Sq Epi: x / Non Sq Epi: x / Bacteria: x        .Blood Blood-Peripheral  21   No growth to date.  --  --      .Blood Blood-Venous  21   Growth in aerobic bottle: Staphylococcus epidermidis  Coag Negative Staphylococcus  Single set isolate, possible contaminant. Contact  Microbiology if susceptibility testing clinically  indicated.  ***Blood Panel PCR results on this specimen are available  approximately 3 hours after the Gram stain result.***  Gram stain, PCR, and/or culture results may not always  correspond due to difference in methodologies.  ************************************************************  This PCR assay was performed by multiplex PCR. This  Assay tests for 66 bacterial and resistance gene targets.  Please refer to the [facility] test directory  at [email or web address] for details.  --  Blood Culture PCR      .Blood Blood-Venous  21   No Growth Final  --  --                    WBC Count: 8.05 (21 @ 05:17)  WBC Count: 7.34 (21 @ 09:47)  WBC Count: 7.12 (21 @ 07:10)  WBC Count: 7.73 (21 @ 00:16)  WBC Count: 7.35 (21 @ 07:19)  WBC Count: 7.60 (21 @ 07:12)  WBC Count: 6.96 (21 @ 11:00)  WBC Count: 9.84 (21 @ 16:41)             Patient is a 86y old  Male who presents with a chief complaint of R toe pain (08 Sep 2021 12:32)    Being followed by ID for        Interval history:  pt seen in the recovery room  s/p   · Operative Findings	Diagnostic angiogram of RLE via Left groin access. 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    Chronic Obstructive Pulmonary Disease (COPD)    Obstructive Sleep Apnea    Mycobacterium Avium-Intracellulare Infection  2009    Deep Vein Thrombosis (DVT)  17 yeras ago on Coumadin    CHF (congestive heart failure)  last exacerbation in 2017    Enlarged prostate    GERD (gastroesophageal reflux disease)    Hernia  umblical    Calculus of bile duct without cholangitis or cholecystitis without obstruction    Atrial fibrillation    S/P Hernia Repair    S/P cataract surgery, unspecified laterality    S/P ERCP  3/2017      Allergies    No Known Allergies    Intolerances      Antimicrobials:      MEDICATIONS  (STANDING):  acetylcysteine  Oral Solution 1200 milliGRAM(s) Oral every 12 hours  atorvastatin 40 milliGRAM(s) Oral at bedtime  buDESOnide    Inhalation Suspension 0.5 milliGRAM(s) Inhalation two times a day  dextrose 50% Injectable 25 Gram(s) IV Push once  dextrose 50% Injectable 12.5 Gram(s) IV Push once  finasteride 5 milliGRAM(s) Oral daily  insulin glargine Injectable (LANTUS) 25 Unit(s) SubCutaneous at bedtime  insulin lispro (ADMELOG) corrective regimen sliding scale   SubCutaneous at bedtime  insulin lispro (ADMELOG) corrective regimen sliding scale   SubCutaneous three times a day before meals  insulin lispro Injectable (ADMELOG) 4 Unit(s) SubCutaneous three times a day before meals  levothyroxine 25 MICROGram(s) Oral daily  metoprolol succinate ER 25 milliGRAM(s) Oral daily  montelukast 10 milliGRAM(s) Oral daily  mupirocin 2% Ointment 1 Application(s) Topical two times a day  pantoprazole    Tablet 40 milliGRAM(s) Oral before breakfast  sodium chloride 0.9%. 1000 milliLiter(s) (75 mL/Hr) IV Continuous <Continuous>  tamsulosin 0.8 milliGRAM(s) Oral at bedtime      Vital Signs Last 24 Hrs  T(C): 36.1 (21 @ 13:30), Max: 36.7 (21 @ 21:32)  T(F): 97 (21 @ 13:30), Max: 98.1 (21 @ 07:34)  HR: 74 (21 @ 13:30) (68 - 83)  BP: 151/67 (21 @ 13:30) (106/64 - 151/67)  BP(mean): 96 (21 @ 13:30) (94 - 97)  RR: 16 (21 @ 13:30) (16 - 18)  SpO2: 97% (21 @ 13:30) (93% - 100%)    Physical Exam:    Constitutional well preserved,comfortable,pleasant    HEENT PERRLA EOMI,No pallor or icterus    No oral exudate or erythema    Neck supple no JVD or LN    Chest Good AE,CTA    CVS  S1 S2     Abd soft BS normal No tenderness     Ext dependent rubor  warm    IV site no erythema tenderness or discharge      Lab Data:                          9.4    8.05  )-----------( 188      ( 08 Sep 2021 05:17 )             30.1           145  |  104  |  58<H>  ----------------------------<  190<H>  3.7   |  29  |  1.60<H>    Ca    9.7      08 Sep 2021 05:17  Phos  3.1       Mg     2.2             Urinalysis Basic - ( 07 Sep 2021 17:24 )    Color: Light Yellow / Appearance: Clear / S.016 / pH: x  Gluc: x / Ketone: Negative  / Bili: Negative / Urobili: Negative   Blood: x / Protein: Negative / Nitrite: Negative   Leuk Esterase: Negative / RBC: x / WBC x   Sq Epi: x / Non Sq Epi: x / Bacteria: x        .Blood Blood-Peripheral  21   No growth to date.  --  --      .Blood Blood-Venous  21   Growth in aerobic bottle: Staphylococcus epidermidis  Coag Negative Staphylococcus  Single set isolate, possible contaminant. Contact  Microbiology if susceptibility testing clinically  indicated.  ***Blood Panel PCR results on this specimen are available  approximately 3 hours after the Gram stain result.***  Gram stain, PCR, and/or culture results may not always  correspond due to difference in methodologies.  ************************************************************  This PCR assay was performed by multiplex PCR. This  Assay tests for 66 bacterial and resistance gene targets.  Please refer to the [facility] Labs test directory  at [email or web address] for details.  --  Blood Culture PCR      .Blood Blood-Venous  21   No Growth Final  --  --                    WBC Count: 8.05 (21 @ 05:17)  WBC Count: 7.34 (21 @ 09:47)  WBC Count: 7.12 (21 @ 07:10)  WBC Count: 7.73 (21 @ 00:16)  WBC Count: 7.35 (21 @ 07:19)  WBC Count: 7.60 (21 @ 07:12)  WBC Count: 6.96 (21 @ 11:00)  WBC Count: 9.84 (21 @ 16:41)

## 2021-09-08 NOTE — PROGRESS NOTE ADULT - SUBJECTIVE AND OBJECTIVE BOX
SURGERY PROGRESS NOTE    SUBJECTIVE / 24H EVENTS:  Patient seen and examined on morning rounds. ALMAZ/PVR with toe pressures performed last night, awaiting final read.       OBJECTIVE:  VITAL SIGNS:  T(C): 36 (21 @ 10:03), Max: 36.7 (21 @ 21:32)  HR: 72 (21 @ 10:08) (71 - 83)  BP: 144/67 (21 @ 10:08) (106/64 - 148/67)  RR: 16 (21 @ 10:08) (16 - 18)  SpO2: 98% (21 @ 10:08) (93% - 100%)  Daily Height in cm: 172.72 (08 Sep 2021 07:32)    Daily   POCT Blood Glucose.: 239 mg/dL (21 @ 22:04)  POCT Blood Glucose.: 213 mg/dL (21 @ 17:51)  POCT Blood Glucose.: 189 mg/dL (21 @ 12:56)      PHYSICAL EXAM:  Gen: NAD  LS: Respirations unlabored on RA  GI: Soft. Nontender. Nondistended  Ext: BLE edema, dependent rubor in b/l toes, pain on palpation of R great toe, clean ulcer on R 4th toe no signs of infection.          21 @ 07:01  -  21 @ 07:00  --------------------------------------------------------  IN:    Oral Fluid: 360 mL  Total IN: 360 mL    OUT:  Total OUT: 0 mL    Total NET: 360 mL          LAB VALUES:      145  |  104  |  58<H>  ----------------------------<  190<H>  3.7   |  29  |  1.60<H>    Ca    9.7      08 Sep 2021 05:17  Phos  3.1       Mg     2.2                                      9.4    8.05  )-----------( 188      ( 08 Sep 2021 05:17 )             30.1       PT/INR - ( 08 Sep 2021 05:17 )   PT: 15.4 sec;   INR: 1.30 ratio         PTT - ( 08 Sep 2021 05:17 )  PTT:>200.0 sec  ABG - ( 08 Sep 2021 09:02 )  pH, Arterial: 7.41  pH, Blood: x     /  pCO2: 53    /  pO2: 200   / HCO3: 34    / Base Excess: 7.9   /  SaO2: 100.0                 Urinalysis Basic - ( 07 Sep 2021 17:24 )    Color: Light Yellow / Appearance: Clear / S.016 / pH: x  Gluc: x / Ketone: Negative  / Bili: Negative / Urobili: Negative   Blood: x / Protein: Negative / Nitrite: Negative   Leuk Esterase: Negative / RBC: x / WBC x   Sq Epi: x / Non Sq Epi: x / Bacteria: x        MICROBIOLOGY:      RADIOLOGY:        MEDICATIONS  (STANDING):  acetylcysteine  Oral Solution 1200 milliGRAM(s) Oral every 12 hours  atorvastatin 40 milliGRAM(s) Oral at bedtime  buDESOnide    Inhalation Suspension 0.5 milliGRAM(s) Inhalation two times a day  dextrose 50% Injectable 12.5 Gram(s) IV Push once  dextrose 50% Injectable 25 Gram(s) IV Push once  finasteride 5 milliGRAM(s) Oral daily  insulin glargine Injectable (LANTUS) 25 Unit(s) SubCutaneous at bedtime  insulin lispro (ADMELOG) corrective regimen sliding scale   SubCutaneous at bedtime  insulin lispro (ADMELOG) corrective regimen sliding scale   SubCutaneous three times a day before meals  insulin lispro Injectable (ADMELOG) 4 Unit(s) SubCutaneous three times a day before meals  levothyroxine 25 MICROGram(s) Oral daily  metoprolol succinate ER 25 milliGRAM(s) Oral daily  montelukast 10 milliGRAM(s) Oral daily  mupirocin 2% Ointment 1 Application(s) Topical two times a day  pantoprazole    Tablet 40 milliGRAM(s) Oral before breakfast  sodium chloride 0.9%. 1000 milliLiter(s) (75 mL/Hr) IV Continuous <Continuous>  tamsulosin 0.8 milliGRAM(s) Oral at bedtime    MEDICATIONS  (PRN):  aluminum hydroxide/magnesium hydroxide/simethicone Suspension 30 milliLiter(s) Oral every 4 hours PRN Dyspepsia  melatonin 3 milliGRAM(s) Oral at bedtime PRN Insomnia  ondansetron Injectable 4 milliGRAM(s) IV Push every 8 hours PRN Nausea and/or Vomiting

## 2021-09-08 NOTE — PROVIDER CONTACT NOTE (CRITICAL VALUE NOTIFICATION) - BACKGROUND
Pt has hx of HTN, HLD, DM type 2
Pt admitted with pain of 3rd toe of right foot, history of AFib, CHF, DVT,COPD,sleep apnea, pt is on Heparin drip at 12cc/hr, not therapeutic
Patient admitted for R toe pain, history of Afib, CHF, calculus, DVT, hernia, ZACKARY
pt admitted for R foot pain

## 2021-09-08 NOTE — PROGRESS NOTE ADULT - ASSESSMENT
86M with PMH of HTN, HLD, Afib on coumadin, CHF, T2DM on insulin, COPD, CVA, BPH p/w R toe pain with non healing ulcer c/f PAD.    Plan:  - OR today for RLE Angiogram   - HD in PM after OR  - Pain control  - Follow cardiology recs  - Follow podiatry recs.   - Follow ID recs  - please continue ASA and statin      Vascular team   [id number]

## 2021-09-08 NOTE — BRIEF OPERATIVE NOTE - OPERATION/FINDINGS
Diagnostic angiogram of RLE via Left groin access. Single vessel AT runoff to foot  R AT signal at end of case

## 2021-09-08 NOTE — PROGRESS NOTE ADULT - ASSESSMENT
86M with PMH of HTN, HLD, Afib on coumadin, CHF, T2DM on insulin, COPD, CVA, BPH p/w R toe pain and ulcer after trauma.  Xray negative for OM.  No fever.  No drainage.  Stable off antimicrobials     Toe Ulcer  - xray negative  - no definite infection  - monitor off antimicrobials  - podiatry f/u    Positive Blood Culture (Staph epi)  - Likely procurement contaminant  - repeat BCx negative    Peripheral Vascular disease  -s/p angiogram today  await further vascular follow up    Sasha Macias M.D. ,   Pager [phone]     after 5PM/ weekends [phone]

## 2021-09-08 NOTE — PROGRESS NOTE ADULT - ASSESSMENT
86M with PMH of HTN, HLD, Afib on coumadin, CHF, T2DM on insulin, COPD, CVA, BPH p/w R toe pain and ulcer after trauma.  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

## 2021-09-09 LAB
ALBUMIN SERPL ELPH-MCNC: 3.2 G/DL — LOW (ref 3.3–5)
ALP SERPL-CCNC: 67 U/L — SIGNIFICANT CHANGE UP (ref 40–120)
ALT FLD-CCNC: 9 U/L — LOW (ref 10–45)
ANION GAP SERPL CALC-SCNC: 13 MMOL/L — SIGNIFICANT CHANGE UP (ref 5–17)
APTT BLD: 132.1 SEC — CRITICAL HIGH (ref 27.5–35.5)
APTT BLD: 43.5 SEC — HIGH (ref 27.5–35.5)
APTT BLD: 50.6 SEC — HIGH (ref 27.5–35.5)
APTT BLD: 80.1 SEC — HIGH (ref 27.5–35.5)
AST SERPL-CCNC: 10 U/L — SIGNIFICANT CHANGE UP (ref 10–40)
BILIRUB SERPL-MCNC: 0.3 MG/DL — SIGNIFICANT CHANGE UP (ref 0.2–1.2)
BUN SERPL-MCNC: 52 MG/DL — HIGH (ref 7–23)
CALCIUM SERPL-MCNC: 9.2 MG/DL — SIGNIFICANT CHANGE UP (ref 8.4–10.5)
CHLORIDE SERPL-SCNC: 105 MMOL/L — SIGNIFICANT CHANGE UP (ref 96–108)
CO2 SERPL-SCNC: 27 MMOL/L — SIGNIFICANT CHANGE UP (ref 22–31)
CREAT SERPL-MCNC: 1.45 MG/DL — HIGH (ref 0.5–1.3)
GLUCOSE BLDC GLUCOMTR-MCNC: 171 MG/DL — HIGH (ref 70–99)
GLUCOSE BLDC GLUCOMTR-MCNC: 195 MG/DL — HIGH (ref 70–99)
GLUCOSE BLDC GLUCOMTR-MCNC: 197 MG/DL — HIGH (ref 70–99)
GLUCOSE BLDC GLUCOMTR-MCNC: 272 MG/DL — HIGH (ref 70–99)
GLUCOSE SERPL-MCNC: 216 MG/DL — HIGH (ref 70–99)
HCT VFR BLD CALC: 28.2 % — LOW (ref 39–50)
HCT VFR BLD CALC: 28.6 % — LOW (ref 39–50)
HGB BLD-MCNC: 8.6 G/DL — LOW (ref 13–17)
HGB BLD-MCNC: 8.9 G/DL — LOW (ref 13–17)
INR BLD: 1.2 RATIO — HIGH (ref 0.88–1.16)
MCHC RBC-ENTMCNC: 28.2 PG — SIGNIFICANT CHANGE UP (ref 27–34)
MCHC RBC-ENTMCNC: 28.3 PG — SIGNIFICANT CHANGE UP (ref 27–34)
MCHC RBC-ENTMCNC: 30.5 GM/DL — LOW (ref 32–36)
MCHC RBC-ENTMCNC: 31.1 GM/DL — LOW (ref 32–36)
MCV RBC AUTO: 90.8 FL — SIGNIFICANT CHANGE UP (ref 80–100)
MCV RBC AUTO: 92.5 FL — SIGNIFICANT CHANGE UP (ref 80–100)
NRBC # BLD: 0 /100 WBCS — SIGNIFICANT CHANGE UP (ref 0–0)
NRBC # BLD: 0 /100 WBCS — SIGNIFICANT CHANGE UP (ref 0–0)
PLATELET # BLD AUTO: 183 K/UL — SIGNIFICANT CHANGE UP (ref 150–400)
PLATELET # BLD AUTO: 189 K/UL — SIGNIFICANT CHANGE UP (ref 150–400)
POTASSIUM SERPL-MCNC: 4 MMOL/L — SIGNIFICANT CHANGE UP (ref 3.5–5.3)
POTASSIUM SERPL-SCNC: 4 MMOL/L — SIGNIFICANT CHANGE UP (ref 3.5–5.3)
PROT SERPL-MCNC: 5.7 G/DL — LOW (ref 6–8.3)
PROTHROM AB SERPL-ACNC: 14.3 SEC — HIGH (ref 10.6–13.6)
RBC # BLD: 3.05 M/UL — LOW (ref 4.2–5.8)
RBC # BLD: 3.15 M/UL — LOW (ref 4.2–5.8)
RBC # FLD: 15.4 % — HIGH (ref 10.3–14.5)
RBC # FLD: 15.5 % — HIGH (ref 10.3–14.5)
SODIUM SERPL-SCNC: 145 MMOL/L — SIGNIFICANT CHANGE UP (ref 135–145)
WBC # BLD: 7.88 K/UL — SIGNIFICANT CHANGE UP (ref 3.8–10.5)
WBC # BLD: 8.16 K/UL — SIGNIFICANT CHANGE UP (ref 3.8–10.5)
WBC # FLD AUTO: 7.88 K/UL — SIGNIFICANT CHANGE UP (ref 3.8–10.5)
WBC # FLD AUTO: 8.16 K/UL — SIGNIFICANT CHANGE UP (ref 3.8–10.5)

## 2021-09-09 PROCEDURE — 99232 SBSQ HOSP IP/OBS MODERATE 35: CPT | Mod: GC

## 2021-09-09 PROCEDURE — 99231 SBSQ HOSP IP/OBS SF/LOW 25: CPT

## 2021-09-09 RX ORDER — INSULIN LISPRO 100/ML
6 VIAL (ML) SUBCUTANEOUS
Refills: 0 | Status: DISCONTINUED | OUTPATIENT
Start: 2021-09-09 | End: 2021-09-15

## 2021-09-09 RX ORDER — WARFARIN SODIUM 2.5 MG/1
5 TABLET ORAL AT BEDTIME
Refills: 0 | Status: DISCONTINUED | OUTPATIENT
Start: 2021-09-09 | End: 2021-09-10

## 2021-09-09 RX ORDER — HEPARIN SODIUM 5000 [USP'U]/ML
3500 INJECTION INTRAVENOUS; SUBCUTANEOUS EVERY 6 HOURS
Refills: 0 | Status: DISCONTINUED | OUTPATIENT
Start: 2021-09-09 | End: 2021-09-13

## 2021-09-09 RX ORDER — HEPARIN SODIUM 5000 [USP'U]/ML
900 INJECTION INTRAVENOUS; SUBCUTANEOUS
Qty: 25000 | Refills: 0 | Status: DISCONTINUED | OUTPATIENT
Start: 2021-09-09 | End: 2021-09-13

## 2021-09-09 RX ADMIN — Medication 650 MILLIGRAM(S): at 06:03

## 2021-09-09 RX ADMIN — HEPARIN SODIUM 900 UNIT(S)/HR: 5000 INJECTION INTRAVENOUS; SUBCUTANEOUS at 12:16

## 2021-09-09 RX ADMIN — Medication 1200 MILLIGRAM(S): at 05:14

## 2021-09-09 RX ADMIN — Medication 6 UNIT(S): at 17:55

## 2021-09-09 RX ADMIN — Medication 650 MILLIGRAM(S): at 07:32

## 2021-09-09 RX ADMIN — Medication 0.5 MILLIGRAM(S): at 05:19

## 2021-09-09 RX ADMIN — Medication 650 MILLIGRAM(S): at 21:53

## 2021-09-09 RX ADMIN — HEPARIN SODIUM 0 UNIT(S)/HR: 5000 INJECTION INTRAVENOUS; SUBCUTANEOUS at 09:27

## 2021-09-09 RX ADMIN — Medication 25 MICROGRAM(S): at 05:16

## 2021-09-09 RX ADMIN — Medication 650 MILLIGRAM(S): at 22:23

## 2021-09-09 RX ADMIN — HEPARIN SODIUM 1100 UNIT(S)/HR: 5000 INJECTION INTRAVENOUS; SUBCUTANEOUS at 17:56

## 2021-09-09 RX ADMIN — WARFARIN SODIUM 5 MILLIGRAM(S): 2.5 TABLET ORAL at 21:55

## 2021-09-09 RX ADMIN — FINASTERIDE 5 MILLIGRAM(S): 5 TABLET, FILM COATED ORAL at 12:52

## 2021-09-09 RX ADMIN — HEPARIN SODIUM 3500 UNIT(S): 5000 INJECTION INTRAVENOUS; SUBCUTANEOUS at 02:52

## 2021-09-09 RX ADMIN — INSULIN GLARGINE 25 UNIT(S): 100 INJECTION, SOLUTION SUBCUTANEOUS at 21:52

## 2021-09-09 RX ADMIN — HEPARIN SODIUM 900 UNIT(S)/HR: 5000 INJECTION INTRAVENOUS; SUBCUTANEOUS at 02:10

## 2021-09-09 RX ADMIN — MONTELUKAST 10 MILLIGRAM(S): 4 TABLET, CHEWABLE ORAL at 12:52

## 2021-09-09 RX ADMIN — MUPIROCIN 1 APPLICATION(S): 20 OINTMENT TOPICAL at 05:20

## 2021-09-09 RX ADMIN — Medication 1: at 21:51

## 2021-09-09 RX ADMIN — Medication 1: at 08:36

## 2021-09-09 RX ADMIN — TAMSULOSIN HYDROCHLORIDE 0.8 MILLIGRAM(S): 0.4 CAPSULE ORAL at 21:54

## 2021-09-09 RX ADMIN — Medication 4 UNIT(S): at 08:36

## 2021-09-09 RX ADMIN — Medication 0.5 MILLIGRAM(S): at 17:41

## 2021-09-09 RX ADMIN — MUPIROCIN 1 APPLICATION(S): 20 OINTMENT TOPICAL at 17:41

## 2021-09-09 RX ADMIN — Medication 1: at 17:39

## 2021-09-09 RX ADMIN — Medication 25 MILLIGRAM(S): at 05:12

## 2021-09-09 RX ADMIN — PANTOPRAZOLE SODIUM 40 MILLIGRAM(S): 20 TABLET, DELAYED RELEASE ORAL at 05:16

## 2021-09-09 RX ADMIN — HEPARIN SODIUM 3500 UNIT(S): 5000 INJECTION INTRAVENOUS; SUBCUTANEOUS at 17:56

## 2021-09-09 RX ADMIN — Medication 650 MILLIGRAM(S): at 06:33

## 2021-09-09 RX ADMIN — ATORVASTATIN CALCIUM 40 MILLIGRAM(S): 80 TABLET, FILM COATED ORAL at 21:55

## 2021-09-09 NOTE — PROVIDER CONTACT NOTE (CRITICAL VALUE NOTIFICATION) - ACTION/TREATMENT ORDERED:
Heparin was D/C
stop for 1 hour and lower rate by 3, new rate = 6.
Replete Potassium
awaiting order
follow Heparin protocol

## 2021-09-09 NOTE — PROGRESS NOTE ADULT - SUBJECTIVE AND OBJECTIVE BOX
SURGERY PROGRESS NOTE    SUBJECTIVE / 24H EVENTS:  Patient seen and examined on morning rounds. No acute events overnight.      OBJECTIVE:  VITAL SIGNS:  T(C): 36.4 (21 @ 04:15), Max: 36.5 (21 @ 20:57)  HR: 74 (21 @ 04:15) (70 - 80)  BP: 121/74 (21 @ 04:15) (111/64 - 151/67)  RR: 17 (21 @ 04:15) (16 - 17)  SpO2: 100% (21 @ 04:15) (96% - 100%)  Daily     Daily   POCT Blood Glucose.: 197 mg/dL (21 @ 12:24)  POCT Blood Glucose.: 195 mg/dL (21 @ 08:23)  POCT Blood Glucose.: 358 mg/dL (21 @ 21:44)      PHYSICAL EXAM:  Gen: NAD  LS: Respirations unlabored   GI: Soft. Nontender. Nondistended. BS+.  Ext: Warm, well perfused      21 @ 07:01  -  21 @ 07:00  --------------------------------------------------------  IN:  Total IN: 0 mL    OUT:    Voided (mL): 350 mL  Total OUT: 350 mL    Total NET: -350 mL          LAB VALUES:      145  |  105  |  52<H>  ----------------------------<  216<H>  4.0   |  27  |  1.45<H>    Ca    9.2      09 Sep 2021 07:07  Phos  3.1       Mg     2.2         TPro  5.7<L>  /  Alb  3.2<L>  /  TBili  0.3  /  DBili  x   /  AST  10  /  ALT  9<L>  /  AlkPhos  67                                 8.6    8.16  )-----------( 183      ( 09 Sep 2021 07:07 )             28.2     LIVER FUNCTIONS - ( 09 Sep 2021 07:07 )  Alb: 3.2 g/dL / Pro: 5.7 g/dL / ALK PHOS: 67 U/L / ALT: 9 U/L / AST: 10 U/L / GGT: x           PT/INR - ( 09 Sep 2021 07:08 )   PT: 14.3 sec;   INR: 1.20 ratio         PTT - ( 09 Sep 2021 09:52 )  PTT:80.1 sec  ABG - ( 08 Sep 2021 09:02 )  pH, Arterial: 7.41  pH, Blood: x     /  pCO2: 53    /  pO2: 200   / HCO3: 34    / Base Excess: 7.9   /  SaO2: 100.0                 Urinalysis Basic - ( 07 Sep 2021 17:24 )    Color: Light Yellow / Appearance: Clear / S.016 / pH: x  Gluc: x / Ketone: Negative  / Bili: Negative / Urobili: Negative   Blood: x / Protein: Negative / Nitrite: Negative   Leuk Esterase: Negative / RBC: x / WBC x   Sq Epi: x / Non Sq Epi: x / Bacteria: x        MICROBIOLOGY:      RADIOLOGY:  PACS Image: Image(s) Available (21 @ 17:00)        MEDICATIONS  (STANDING):  atorvastatin 40 milliGRAM(s) Oral at bedtime  buDESOnide    Inhalation Suspension 0.5 milliGRAM(s) Inhalation two times a day  dextrose 50% Injectable 25 Gram(s) IV Push once  dextrose 50% Injectable 12.5 Gram(s) IV Push once  finasteride 5 milliGRAM(s) Oral daily  heparin  Infusion. 900 Unit(s)/Hr (9 mL/Hr) IV Continuous <Continuous>  insulin glargine Injectable (LANTUS) 25 Unit(s) SubCutaneous at bedtime  insulin lispro (ADMELOG) corrective regimen sliding scale   SubCutaneous three times a day before meals  insulin lispro (ADMELOG) corrective regimen sliding scale   SubCutaneous at bedtime  insulin lispro Injectable (ADMELOG) 6 Unit(s) SubCutaneous three times a day before meals  levothyroxine 25 MICROGram(s) Oral daily  metoprolol succinate ER 25 milliGRAM(s) Oral daily  montelukast 10 milliGRAM(s) Oral daily  mupirocin 2% Ointment 1 Application(s) Topical two times a day  pantoprazole    Tablet 40 milliGRAM(s) Oral before breakfast  sodium chloride 0.9%. 1000 milliLiter(s) (75 mL/Hr) IV Continuous <Continuous>  tamsulosin 0.8 milliGRAM(s) Oral at bedtime  warfarin 5 milliGRAM(s) Oral at bedtime    MEDICATIONS  (PRN):  acetaminophen   Tablet .. 650 milliGRAM(s) Oral every 6 hours PRN Mild Pain (1 - 3)  aluminum hydroxide/magnesium hydroxide/simethicone Suspension 30 milliLiter(s) Oral every 4 hours PRN Dyspepsia  heparin   Injectable 3500 Unit(s) IV Push every 6 hours PRN For aPTT between 40 - 57  melatonin 3 milliGRAM(s) Oral at bedtime PRN Insomnia  ondansetron Injectable 4 milliGRAM(s) IV Push every 8 hours PRN Nausea and/or Vomiting        SURGERY PROGRESS NOTE    SUBJECTIVE / 24H EVENTS:  Patient seen and examined on morning rounds.     OBJECTIVE:  VITAL SIGNS:  T(C): 36.4 (21 @ 04:15), Max: 36.5 (21 @ 20:57)  HR: 74 (21 @ 04:15) (70 - 80)  BP: 121/74 (21 @ 04:15) (111/64 - 151/67)  RR: 17 (21 @ 04:15) (16 - 17)  SpO2: 100% (21 @ 04:15) (96% - 100%)  Daily     Daily   POCT Blood Glucose.: 197 mg/dL (21 @ 12:24)  POCT Blood Glucose.: 195 mg/dL (21 @ 08:23)  POCT Blood Glucose.: 358 mg/dL (21 @ 21:44)      PHYSICAL EXAM:  Gen: NAD  LS: Respirations unlabored on RA  GI: Soft. Nontender. Nondistended  Ext: BLE edema, dependent rubor in b/l toes, pain on palpation of R great toe, clean ulcer on R 4th toe no signs of infection.  2+ Fem b/l.       21 @ 07:01  -  21 @ 07:00  --------------------------------------------------------  IN:  Total IN: 0 mL    OUT:    Voided (mL): 350 mL  Total OUT: 350 mL    Total NET: -350 mL          LAB VALUES:      145  |  105  |  52<H>  ----------------------------<  216<H>  4.0   |  27  |  1.45<H>    Ca    9.2      09 Sep 2021 07:07  Phos  3.1       Mg     2.2         TPro  5.7<L>  /  Alb  3.2<L>  /  TBili  0.3  /  DBili  x   /  AST  10  /  ALT  9<L>  /  AlkPhos  67  09-09                               8.6    8.16  )-----------( 183      ( 09 Sep 2021 07:07 )             28.2     LIVER FUNCTIONS - ( 09 Sep 2021 07:07 )  Alb: 3.2 g/dL / Pro: 5.7 g/dL / ALK PHOS: 67 U/L / ALT: 9 U/L / AST: 10 U/L / GGT: x           PT/INR - ( 09 Sep 2021 07:08 )   PT: 14.3 sec;   INR: 1.20 ratio         PTT - ( 09 Sep 2021 09:52 )  PTT:80.1 sec  ABG - ( 08 Sep 2021 09:02 )  pH, Arterial: 7.41  pH, Blood: x     /  pCO2: 53    /  pO2: 200   / HCO3: 34    / Base Excess: 7.9   /  SaO2: 100.0                 Urinalysis Basic - ( 07 Sep 2021 17:24 )    Color: Light Yellow / Appearance: Clear / S.016 / pH: x  Gluc: x / Ketone: Negative  / Bili: Negative / Urobili: Negative   Blood: x / Protein: Negative / Nitrite: Negative   Leuk Esterase: Negative / RBC: x / WBC x   Sq Epi: x / Non Sq Epi: x / Bacteria: x        MICROBIOLOGY:      RADIOLOGY:  PACS Image: Image(s) Available (21 @ 17:00)        MEDICATIONS  (STANDING):  atorvastatin 40 milliGRAM(s) Oral at bedtime  buDESOnide    Inhalation Suspension 0.5 milliGRAM(s) Inhalation two times a day  dextrose 50% Injectable 25 Gram(s) IV Push once  dextrose 50% Injectable 12.5 Gram(s) IV Push once  finasteride 5 milliGRAM(s) Oral daily  heparin  Infusion. 900 Unit(s)/Hr (9 mL/Hr) IV Continuous <Continuous>  insulin glargine Injectable (LANTUS) 25 Unit(s) SubCutaneous at bedtime  insulin lispro (ADMELOG) corrective regimen sliding scale   SubCutaneous three times a day before meals  insulin lispro (ADMELOG) corrective regimen sliding scale   SubCutaneous at bedtime  insulin lispro Injectable (ADMELOG) 6 Unit(s) SubCutaneous three times a day before meals  levothyroxine 25 MICROGram(s) Oral daily  metoprolol succinate ER 25 milliGRAM(s) Oral daily  montelukast 10 milliGRAM(s) Oral daily  mupirocin 2% Ointment 1 Application(s) Topical two times a day  pantoprazole    Tablet 40 milliGRAM(s) Oral before breakfast  sodium chloride 0.9%. 1000 milliLiter(s) (75 mL/Hr) IV Continuous <Continuous>  tamsulosin 0.8 milliGRAM(s) Oral at bedtime  warfarin 5 milliGRAM(s) Oral at bedtime    MEDICATIONS  (PRN):  acetaminophen   Tablet .. 650 milliGRAM(s) Oral every 6 hours PRN Mild Pain (1 - 3)  aluminum hydroxide/magnesium hydroxide/simethicone Suspension 30 milliLiter(s) Oral every 4 hours PRN Dyspepsia  heparin   Injectable 3500 Unit(s) IV Push every 6 hours PRN For aPTT between 40 - 57  melatonin 3 milliGRAM(s) Oral at bedtime PRN Insomnia  ondansetron Injectable 4 milliGRAM(s) IV Push every 8 hours PRN Nausea and/or Vomiting

## 2021-09-09 NOTE — PHARMACOTHERAPY INTERVENTION NOTE - COMMENTS
87 yo M with DM A1C 7.4 on glipizide, meformin, and lantus 30 HS at home, currently admitted for R toe pain. Currently on lantus 25 units SQ HS, admelog 4 units SQ TID, and admelog low scale. BG in past 24 hours were 195, 358, 364, 211. Recommend increasing admelog to 6 units SQ TID.    Wilder Bangura, PharmD, BCPS  [phone]  Available on [employer] Teams

## 2021-09-09 NOTE — PROGRESS NOTE ADULT - SUBJECTIVE AND OBJECTIVE BOX
Patient is a 86y old  Male who presents with a chief complaint of R toe pain (09 Sep 2021 09:36)    Being followed by ID for        Interval history:  No other acute events      ROS:  No cough,SOB,CP  No N/V/D  No abd pain  No urinary complaints  No HA  No joint or limb pain  No other complaints    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    Chronic Obstructive Pulmonary Disease (COPD)    Obstructive Sleep Apnea    Mycobacterium Avium-Intracellulare Infection  2009    Deep Vein Thrombosis (DVT)  17 yeras ago on Coumadin    CHF (congestive heart failure)  last exacerbation in 2017    Enlarged prostate    GERD (gastroesophageal reflux disease)    Hernia  umblical    Calculus of bile duct without cholangitis or cholecystitis without obstruction    Atrial fibrillation    S/P Hernia Repair    S/P cataract surgery, unspecified laterality    S/P ERCP  3/2017      Allergies    No Known Allergies    Intolerances      Antimicrobials:      MEDICATIONS  (STANDING):  atorvastatin 40 milliGRAM(s) Oral at bedtime  buDESOnide    Inhalation Suspension 0.5 milliGRAM(s) Inhalation two times a day  dextrose 50% Injectable 25 Gram(s) IV Push once  dextrose 50% Injectable 12.5 Gram(s) IV Push once  finasteride 5 milliGRAM(s) Oral daily  heparin  Infusion. 700 Unit(s)/Hr (7 mL/Hr) IV Continuous <Continuous>  insulin glargine Injectable (LANTUS) 25 Unit(s) SubCutaneous at bedtime  insulin lispro (ADMELOG) corrective regimen sliding scale   SubCutaneous three times a day before meals  insulin lispro (ADMELOG) corrective regimen sliding scale   SubCutaneous at bedtime  insulin lispro Injectable (ADMELOG) 6 Unit(s) SubCutaneous three times a day before meals  levothyroxine 25 MICROGram(s) Oral daily  metoprolol succinate ER 25 milliGRAM(s) Oral daily  montelukast 10 milliGRAM(s) Oral daily  mupirocin 2% Ointment 1 Application(s) Topical two times a day  pantoprazole    Tablet 40 milliGRAM(s) Oral before breakfast  sodium chloride 0.9%. 1000 milliLiter(s) (75 mL/Hr) IV Continuous <Continuous>  tamsulosin 0.8 milliGRAM(s) Oral at bedtime  warfarin 5 milliGRAM(s) Oral at bedtime      Vital Signs Last 24 Hrs  T(C): 36.4 (21 @ 04:15), Max: 36.5 (21 @ 20:57)  T(F): 97.5 (21 @ 04:15), Max: 97.7 (21 @ 20:57)  HR: 74 (21 @ 04:15) (68 - 80)  BP: 121/74 (21 @ 04:15) (111/64 - 151/67)  BP(mean): 96 (21 @ 13:30) (94 - 97)  RR: 17 (21 @ 04:15) (16 - 17)  SpO2: 100% (21 @ 04:15) (93% - 100%)    Physical Exam:    Constitutional well preserved,comfortable,pleasant    HEENT PERRLA EOMI,No pallor or icterus    No oral exudate or erythema    Neck supple no JVD or LN    Chest Good AE,CTA    CVS RRR S1 S2 WNl No murmur or rub or gallop    Abd soft BS normal No tenderness no masses    Ext No cyanosis clubbing or edema    IV site no erythema tenderness or discharge    Joints no swelling or LOM    CNS AAO X 3 no focal    Lab Data:                          8.6    8.16  )-----------( 183      ( 09 Sep 2021 07:07 )             28.2           145  |  105  |  52<H>  ----------------------------<  216<H>  4.0   |  27  |  1.45<H>    Ca    9.2      09 Sep 2021 07:07  Phos  3.1       Mg     2.2         TPro  5.7<L>  /  Alb  3.2<L>  /  TBili  0.3  /  DBili  x   /  AST  10  /  ALT  9<L>  /  AlkPhos  67        Urinalysis Basic - ( 07 Sep 2021 17:24 )    Color: Light Yellow / Appearance: Clear / S.016 / pH: x  Gluc: x / Ketone: Negative  / Bili: Negative / Urobili: Negative   Blood: x / Protein: Negative / Nitrite: Negative   Leuk Esterase: Negative / RBC: x / WBC x   Sq Epi: x / Non Sq Epi: x / Bacteria: x        .Blood Blood-Peripheral  21   No growth to date.  --  --      .Blood Blood-Venous  21   Growth in aerobic bottle: Staphylococcus epidermidis  Coag Negative Staphylococcus  Single set isolate, possible contaminant. Contact  Microbiology if susceptibility testing clinically  indicated.  ***Blood Panel PCR results on this specimen are available  approximately 3 hours after the Gram stain result.***  Gram stain, PCR, and/or culture results may not always  correspond due to difference in methodologies.  ************************************************************  This PCR assay was performed by multiplex PCR. This  Assay tests for 66 bacterial and resistance gene targets.  Please refer to the [facility] Labs test directory  at [email or web address] for details.  --  Blood Culture PCR      .Blood Blood-Venous  21   No Growth Final  --  --      WBC Count: 8.16 (21 @ 07:07)  WBC Count: 7.88 (21 @ 01:17)  WBC Count: 8.05 (21 @ 05:17)  WBC Count: 7.34 (21 @ 09:47)  WBC Count: 7.12 (21 @ 07:10)  WBC Count: 7.73 (21 @ 00:16)  WBC Count: 7.35 (21 @ 07:19)  WBC Count: 7.60 (21 @ 07:12)  WBC Count: 6.96 (21 @ 11:00)  WBC Count: 9.84 (21 @ 16:41)             Patient is a 86y old  Male who presents with a chief complaint of R toe pain (09 Sep 2021 09:36)    Being followed by ID for        Interval history:  pt resting quietly  no specific complaints  awaiting for vascular plans  No other acute events        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    Chronic Obstructive Pulmonary Disease (COPD)    Obstructive Sleep Apnea    Mycobacterium Avium-Intracellulare Infection  2009    Deep Vein Thrombosis (DVT)  17 yeras ago on Coumadin    CHF (congestive heart failure)  last exacerbation in 2017    Enlarged prostate    GERD (gastroesophageal reflux disease)    Hernia  umblical    Calculus of bile duct without cholangitis or cholecystitis without obstruction    Atrial fibrillation    S/P Hernia Repair    S/P cataract surgery, unspecified laterality    S/P ERCP  3/2017      Allergies    No Known Allergies    Intolerances      Antimicrobials:      MEDICATIONS  (STANDING):  atorvastatin 40 milliGRAM(s) Oral at bedtime  buDESOnide    Inhalation Suspension 0.5 milliGRAM(s) Inhalation two times a day  dextrose 50% Injectable 25 Gram(s) IV Push once  dextrose 50% Injectable 12.5 Gram(s) IV Push once  finasteride 5 milliGRAM(s) Oral daily  heparin  Infusion. 700 Unit(s)/Hr (7 mL/Hr) IV Continuous <Continuous>  insulin glargine Injectable (LANTUS) 25 Unit(s) SubCutaneous at bedtime  insulin lispro (ADMELOG) corrective regimen sliding scale   SubCutaneous three times a day before meals  insulin lispro (ADMELOG) corrective regimen sliding scale   SubCutaneous at bedtime  insulin lispro Injectable (ADMELOG) 6 Unit(s) SubCutaneous three times a day before meals  levothyroxine 25 MICROGram(s) Oral daily  metoprolol succinate ER 25 milliGRAM(s) Oral daily  montelukast 10 milliGRAM(s) Oral daily  mupirocin 2% Ointment 1 Application(s) Topical two times a day  pantoprazole    Tablet 40 milliGRAM(s) Oral before breakfast  sodium chloride 0.9%. 1000 milliLiter(s) (75 mL/Hr) IV Continuous <Continuous>  tamsulosin 0.8 milliGRAM(s) Oral at bedtime  warfarin 5 milliGRAM(s) Oral at bedtime      Vital Signs Last 24 Hrs  T(C): 36.4 (21 @ 04:15), Max: 36.5 (21 @ 20:57)  T(F): 97.5 (21 @ 04:15), Max: 97.7 (21 @ 20:57)  HR: 74 (21 @ 04:15) (68 - 80)  BP: 121/74 (21 @ 04:15) (111/64 - 151/67)  BP(mean): 96 (21 @ 13:30) (94 - 97)  RR: 17 (21 @ 04:15) (16 - 17)  SpO2: 100% (21 @ 04:15) (93% - 100%)    Physical Exam:    Constitutional  resting quietly wearing nasal cannula    HEENT PERRLA EOMI,No pallor or icterus    No oral exudate or erythema    Neck supple no JVD or LN    Chest Good AE,CTA    CVS  S1 S2     Abd soft BS normal No tenderness     Ext No cyanosis clubbing or edema  feet dressed    IV site no erythema tenderness or discharge      Lab Data:                          8.6    8.16  )-----------( 183      ( 09 Sep 2021 07:07 )             28.2           145  |  105  |  52<H>  ----------------------------<  216<H>  4.0   |  27  |  1.45<H>    Ca    9.2      09 Sep 2021 07:07  Phos  3.1       Mg     2.2         TPro  5.7<L>  /  Alb  3.2<L>  /  TBili  0.3  /  DBili  x   /  AST  10  /  ALT  9<L>  /  AlkPhos  67        Urinalysis Basic - ( 07 Sep 2021 17:24 )    Color: Light Yellow / Appearance: Clear / S.016 / pH: x  Gluc: x / Ketone: Negative  / Bili: Negative / Urobili: Negative   Blood: x / Protein: Negative / Nitrite: Negative   Leuk Esterase: Negative / RBC: x / WBC x   Sq Epi: x / Non Sq Epi: x / Bacteria: x        .Blood Blood-Peripheral  21   No growth to date.  --  --      .Blood Blood-Venous  21   Growth in aerobic bottle: Staphylococcus epidermidis  Coag Negative Staphylococcus  Single set isolate, possible contaminant. Contact  Microbiology if susceptibility testing clinically  indicated.  ***Blood Panel PCR results on this specimen are available  approximately 3 hours after the Gram stain result.***  Gram stain, PCR, and/or culture results may not always  correspond due to difference in methodologies.  ************************************************************  This PCR assay was performed by multiplex PCR. This  Assay tests for 66 bacterial and resistance gene targets.  Please refer to the [facility] Labs test directory  at [email or web address] for details.  --  Blood Culture PCR      .Blood Blood-Venous  21   No Growth Final  --  --      WBC Count: 8.16 (21 @ 07:07)  WBC Count: 7.88 (21 @ 01:17)  WBC Count: 8.05 (21 @ 05:17)  WBC Count: 7.34 (21 @ 09:47)  WBC Count: 7.12 (21 @ 07:10)  WBC Count: 7.73 (21 @ 00:16)  WBC Count: 7.35 (21 @ 07:19)  WBC Count: 7.60 (21 @ 07:12)  WBC Count: 6.96 (21 @ 11:00)  WBC Count: 9.84 (21 @ 16:41)

## 2021-09-09 NOTE — PROGRESS NOTE ADULT - ASSESSMENT
86M with PMH of HTN, HLD, Afib on coumadin, CHF, T2DM on insulin, COPD, CVA, BPH p/w R toe pain with non healing ulcer c/f PAD.    Plan:   - Pain control  - Follow cardiology recs  - Follow podiatry recs.   - Follow ID recs  - please continue ASA and statin  - local wound care  - Will hold off on SAFARI for now, pt should follow up with Dr. Quinn outpatient.   - Care per primary team     Call for appointment   Vascular Surgery   Tato Quinn MD   Phone: [phone]    Pippa Zaragoza PGY3   Vascular Surgery   [id number] 86M with PMH of HTN, HLD, Afib on coumadin, CHF, T2DM on insulin, COPD, CVA, BPH p/w R toe pain with non healing ulcer c/f PAD.    Plan:   - Pain control  - Follow cardiology recs  - Follow podiatry recs.   - Follow ID recs  - please continue ASA and statin  - local wound care  - Pt should follow up with Dr. Quinn outpatient for further intervention.    - Care per primary team     Call for appointment   Vascular Surgery   Tato Quinn MD   Phone: [phone]    Pippa Zaragoza PGY3   Vascular Surgery   [id number]

## 2021-09-09 NOTE — PROGRESS NOTE ADULT - ASSESSMENT
86M with PMH of HTN, HLD, Afib on coumadin, CHF, T2DM on insulin, COPD, CVA, BPH p/w R toe pain and ulcer after trauma.  Xray negative for OM.  No fever.  No drainage.  Stable off antimicrobials     Toe Ulcer  - xray negative  - no definite infection  - monitor off antimicrobials  - podiatry f/u    Positive Blood Culture (Staph epi)  - Likely procurement contaminant  - repeat BCx negative    Peripheral Vascular disease  -s/p angiogram yesterday  await further vascular follow up    Sasha Macias M.D. ,   Pager [phone]     after 5PM/ weekends [phone]    ID will follow the patient PRN. Please recontact ID if we can be of further assistance . [phone]   86M with PMH of HTN, HLD, Afib on coumadin, CHF, T2DM on insulin, COPD, CVA, BPH p/w R toe pain and ulcer after trauma.  Xray negative for OM.  No fever.  No drainage.  Stable off antimicrobials     Toe Ulcer  - xray negative  - no definite infection  - monitor off antimicrobials  - podiatry f/u    Positive Blood Culture (Staph epi)  - Likely procurement contaminant  - repeat BCx negative    Peripheral Vascular disease  -s/p angiogram yesterday  await further vascular follow up    ANEMIA  - check B12, folate, iron studies, and guiac and haptoglobin  trend    Sasha Macias M.D. ,   Pager [phone]     after 5PM/ weekends [phone]    ID will follow the patient PRN. Please recontact ID if we can be of further assistance . [phone]

## 2021-09-09 NOTE — PROGRESS NOTE ADULT - SUBJECTIVE AND OBJECTIVE BOX
[facility] - ORESTES Wall / ORESTES Wynne / KARIE Melendez/ ORESTES Knight/ ORESTES Jang/ ADRIA Lee / TOÑITO Mora / ROBB Kapoor  ---------------------------------------------------------------------------------------------------------------  seen and examined today for CKD  Interval : serum creatinine stable to improving  VITALS:  T(F): 97.5 (09-09-21 @ 04:15), Max: 97.7 (09-08-21 @ 20:57)  HR: 74 (09-09-21 @ 04:15)  BP: 121/74 (09-09-21 @ 04:15)  RR: 17 (09-09-21 @ 04:15)  SpO2: 100% (09-09-21 @ 04:15)  Wt(kg): --    09-08 @ 07:01  -  09-09 @ 07:00  --------------------------------------------------------  IN: 0 mL / OUT: 350 mL / NET: -350 mL      Physical Exam :-  Constitutional: NAD  Neck: Supple.  Respiratory: Bilateral equal breath sounds,  Cardiovascular: S1, S2 normal,  Gastrointestinal: Bowel Sounds present, soft, non tender.  Extremities: No edema  Neurological: Alert and Oriented x 3, no focal deficits  Psychiatric: Normal mood, normal affect  Data:-  Allergies :   No Known Allergies    Hospital Medications:   MEDICATIONS  (STANDING):  atorvastatin 40 milliGRAM(s) Oral at bedtime  buDESOnide    Inhalation Suspension 0.5 milliGRAM(s) Inhalation two times a day  dextrose 50% Injectable 25 Gram(s) IV Push once  dextrose 50% Injectable 12.5 Gram(s) IV Push once  finasteride 5 milliGRAM(s) Oral daily  heparin  Infusion. 700 Unit(s)/Hr (7 mL/Hr) IV Continuous <Continuous>  insulin glargine Injectable (LANTUS) 25 Unit(s) SubCutaneous at bedtime  insulin lispro (ADMELOG) corrective regimen sliding scale   SubCutaneous three times a day before meals  insulin lispro (ADMELOG) corrective regimen sliding scale   SubCutaneous at bedtime  insulin lispro Injectable (ADMELOG) 6 Unit(s) SubCutaneous three times a day before meals  levothyroxine 25 MICROGram(s) Oral daily  metoprolol succinate ER 25 milliGRAM(s) Oral daily  montelukast 10 milliGRAM(s) Oral daily  mupirocin 2% Ointment 1 Application(s) Topical two times a day  pantoprazole    Tablet 40 milliGRAM(s) Oral before breakfast  sodium chloride 0.9%. 1000 milliLiter(s) (75 mL/Hr) IV Continuous <Continuous>  tamsulosin 0.8 milliGRAM(s) Oral at bedtime  warfarin 5 milliGRAM(s) Oral at bedtime    09-09    145  |  105  |  52<H>  ----------------------------<  216<H>  4.0   |  27  |  1.45<H>    Ca    9.2      09 Sep 2021 07:07  Phos  3.1     09-08  Mg     2.2     09-08    TPro  5.7<L>  /  Alb  3.2<L>  /  TBili  0.3  /  DBili      /  AST  10  /  ALT  9<L>  /  AlkPhos  67  09-09    Creatinine Trend: 1.45 <--, 1.60 <--, 1.75 <--, 1.98 <--, 1.72 <--, 1.51 <--, 1.65 <--, 1.44 <--, 1.81 <--                        8.6    8.16  )-----------( 183      ( 09 Sep 2021 07:07 )             28.2

## 2021-09-09 NOTE — PROVIDER CONTACT NOTE (CRITICAL VALUE NOTIFICATION) - PERSON GIVING RESULT:
Laura King from core labs
Lab елена Gomez
sunny
BLANCA Cerna
Mack Sauceda/ [facility]

## 2021-09-09 NOTE — PROGRESS NOTE ADULT - SUBJECTIVE AND OBJECTIVE BOX
Patient is a 86y old  Male who presents with a chief complaint of R toe pain (09 Sep 2021 12:50)      SUBJECTIVE / OVERNIGHT EVENTS:    Events noted.  CONSTITUTIONAL: No fever,  or fatigue  RESPIRATORY: No cough, wheezing,  No shortness of breath  CARDIOVASCULAR: No chest pain, palpitations  GASTROINTESTINAL: No abdominal or epigastric pain.   NEUROLOGICAL: No headaches,     MEDICATIONS  (STANDING):  atorvastatin 40 milliGRAM(s) Oral at bedtime  buDESOnide    Inhalation Suspension 0.5 milliGRAM(s) Inhalation two times a day  dextrose 50% Injectable 25 Gram(s) IV Push once  dextrose 50% Injectable 12.5 Gram(s) IV Push once  finasteride 5 milliGRAM(s) Oral daily  heparin  Infusion. 900 Unit(s)/Hr (9 mL/Hr) IV Continuous <Continuous>  insulin glargine Injectable (LANTUS) 25 Unit(s) SubCutaneous at bedtime  insulin lispro (ADMELOG) corrective regimen sliding scale   SubCutaneous at bedtime  insulin lispro (ADMELOG) corrective regimen sliding scale   SubCutaneous three times a day before meals  insulin lispro Injectable (ADMELOG) 6 Unit(s) SubCutaneous three times a day before meals  levothyroxine 25 MICROGram(s) Oral daily  metoprolol succinate ER 25 milliGRAM(s) Oral daily  montelukast 10 milliGRAM(s) Oral daily  mupirocin 2% Ointment 1 Application(s) Topical two times a day  pantoprazole    Tablet 40 milliGRAM(s) Oral before breakfast  sodium chloride 0.9%. 1000 milliLiter(s) (75 mL/Hr) IV Continuous <Continuous>  tamsulosin 0.8 milliGRAM(s) Oral at bedtime  warfarin 5 milliGRAM(s) Oral at bedtime    MEDICATIONS  (PRN):  acetaminophen   Tablet .. 650 milliGRAM(s) Oral every 6 hours PRN Mild Pain (1 - 3)  aluminum hydroxide/magnesium hydroxide/simethicone Suspension 30 milliLiter(s) Oral every 4 hours PRN Dyspepsia  heparin   Injectable 3500 Unit(s) IV Push every 6 hours PRN For aPTT between 40 - 57  melatonin 3 milliGRAM(s) Oral at bedtime PRN Insomnia  ondansetron Injectable 4 milliGRAM(s) IV Push every 8 hours PRN Nausea and/or Vomiting        CAPILLARY BLOOD GLUCOSE      POCT Blood Glucose.: 272 mg/dL (09 Sep 2021 21:35)  POCT Blood Glucose.: 171 mg/dL (09 Sep 2021 17:28)  POCT Blood Glucose.: 197 mg/dL (09 Sep 2021 12:24)  POCT Blood Glucose.: 195 mg/dL (09 Sep 2021 08:23)    I&O's Summary    08 Sep 2021 07:01  -  09 Sep 2021 07:00  --------------------------------------------------------  IN: 0 mL / OUT: 350 mL / NET: -350 mL    09 Sep 2021 07:01  -  09 Sep 2021 23:38  --------------------------------------------------------  IN: 240 mL / OUT: 400 mL / NET: -160 mL        T(C): 36.3 (09-09-21 @ 20:14), Max: 36.4 (09-09-21 @ 04:15)  HR: 82 (09-09-21 @ 20:14) (74 - 82)  BP: 132/82 (09-09-21 @ 20:14) (121/74 - 132/82)  RR: 18 (09-09-21 @ 20:14) (17 - 18)  SpO2: 100% (09-09-21 @ 20:14) (100% - 100%)    PHYSICAL EXAM:  GENERAL: NAD  NECK: Supple, No JVD  CHEST/LUNG: Clear to auscultation bilaterally; No wheezing.  HEART: Regular rate and rhythm; No murmurs, rubs, or gallops  ABDOMEN: Soft, Nontender, Nondistended; Bowel sounds present  EXTREMITIES:   No edema  NEUROLOGY: AAO X 3      LABS:                        8.6    8.16  )-----------( 183      ( 09 Sep 2021 07:07 )             28.2     09-09    145  |  105  |  52<H>  ----------------------------<  216<H>  4.0   |  27  |  1.45<H>    Ca    9.2      09 Sep 2021 07:07  Phos  3.1     09-08  Mg     2.2     09-08    TPro  5.7<L>  /  Alb  3.2<L>  /  TBili  0.3  /  DBili  x   /  AST  10  /  ALT  9<L>  /  AlkPhos  67  09-09    PT/INR - ( 09 Sep 2021 07:08 )   PT: 14.3 sec;   INR: 1.20 ratio         PTT - ( 09 Sep 2021 17:23 )  PTT:50.6 sec        CAPILLARY BLOOD GLUCOSE      POCT Blood Glucose.: 272 mg/dL (09 Sep 2021 21:35)  POCT Blood Glucose.: 171 mg/dL (09 Sep 2021 17:28)  POCT Blood Glucose.: 197 mg/dL (09 Sep 2021 12:24)  POCT Blood Glucose.: 195 mg/dL (09 Sep 2021 08:23)        RADIOLOGY & ADDITIONAL TESTS:    Imaging Personally Reviewed:    Consultant(s) Notes Reviewed:      Care Discussed with Consultants/Other Providers:    Graham Pulliam MD, CMD, FACP    [address] [place] [address]  Office Tel: [phone]  Cell: [phone]

## 2021-09-09 NOTE — PROGRESS NOTE ADULT - ATTENDING COMMENTS
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 AT reconstitutes in mid calf and gives rise to patent DP  poor surgical bypass candidate  SAFARI high risk given single vessel runoff  WIfI: 1 3 0, Clinical Stage 3, Amputation Risk: Moderate  Discussed with family who agree to manage conservatively for now, will discharge patient home and if rest pain becomes unbearable or patient develops worsening tissue loss will discuss further options as outpatient

## 2021-09-09 NOTE — PROVIDER CONTACT NOTE (CRITICAL VALUE NOTIFICATION) - SITUATION
as above
on heparin drip bridging to coumadin/.
BC + cocci in cluster
Potassium 2.9
Pt was admitted with cellulitis of low ext ,5th toes

## 2021-09-10 ENCOUNTER — APPOINTMENT (OUTPATIENT)
Dept: CARDIOLOGY | Facility: CLINIC | Age: 86
End: 2021-09-10

## 2021-09-10 ENCOUNTER — TRANSCRIPTION ENCOUNTER (OUTPATIENT)
Age: 86
End: 2021-09-10

## 2021-09-10 LAB
ANION GAP SERPL CALC-SCNC: 13 MMOL/L — SIGNIFICANT CHANGE UP (ref 5–17)
APTT BLD: 74.3 SEC — HIGH (ref 27.5–35.5)
APTT BLD: 81.1 SEC — HIGH (ref 27.5–35.5)
BUN SERPL-MCNC: 53 MG/DL — HIGH (ref 7–23)
CALCIUM SERPL-MCNC: 9.3 MG/DL — SIGNIFICANT CHANGE UP (ref 8.4–10.5)
CHLORIDE SERPL-SCNC: 107 MMOL/L — SIGNIFICANT CHANGE UP (ref 96–108)
CO2 SERPL-SCNC: 27 MMOL/L — SIGNIFICANT CHANGE UP (ref 22–31)
CREAT SERPL-MCNC: 1.65 MG/DL — HIGH (ref 0.5–1.3)
CULTURE RESULTS: SIGNIFICANT CHANGE UP
CULTURE RESULTS: SIGNIFICANT CHANGE UP
GLUCOSE BLDC GLUCOMTR-MCNC: 119 MG/DL — HIGH (ref 70–99)
GLUCOSE BLDC GLUCOMTR-MCNC: 204 MG/DL — HIGH (ref 70–99)
GLUCOSE BLDC GLUCOMTR-MCNC: 204 MG/DL — HIGH (ref 70–99)
GLUCOSE BLDC GLUCOMTR-MCNC: 93 MG/DL — SIGNIFICANT CHANGE UP (ref 70–99)
GLUCOSE SERPL-MCNC: 123 MG/DL — HIGH (ref 70–99)
HCT VFR BLD CALC: 28.6 % — LOW (ref 39–50)
HGB BLD-MCNC: 8.6 G/DL — LOW (ref 13–17)
INR BLD: 1.13 RATIO — SIGNIFICANT CHANGE UP (ref 0.88–1.16)
MCHC RBC-ENTMCNC: 27.7 PG — SIGNIFICANT CHANGE UP (ref 27–34)
MCHC RBC-ENTMCNC: 30.1 GM/DL — LOW (ref 32–36)
MCV RBC AUTO: 92 FL — SIGNIFICANT CHANGE UP (ref 80–100)
NRBC # BLD: 0 /100 WBCS — SIGNIFICANT CHANGE UP (ref 0–0)
PLATELET # BLD AUTO: 185 K/UL — SIGNIFICANT CHANGE UP (ref 150–400)
POTASSIUM SERPL-MCNC: 4.1 MMOL/L — SIGNIFICANT CHANGE UP (ref 3.5–5.3)
POTASSIUM SERPL-SCNC: 4.1 MMOL/L — SIGNIFICANT CHANGE UP (ref 3.5–5.3)
PROTHROM AB SERPL-ACNC: 13.5 SEC — SIGNIFICANT CHANGE UP (ref 10.6–13.6)
RBC # BLD: 3.11 M/UL — LOW (ref 4.2–5.8)
RBC # FLD: 15.6 % — HIGH (ref 10.3–14.5)
SODIUM SERPL-SCNC: 147 MMOL/L — HIGH (ref 135–145)
SPECIMEN SOURCE: SIGNIFICANT CHANGE UP
SPECIMEN SOURCE: SIGNIFICANT CHANGE UP
WBC # BLD: 8.29 K/UL — SIGNIFICANT CHANGE UP (ref 3.8–10.5)
WBC # FLD AUTO: 8.29 K/UL — SIGNIFICANT CHANGE UP (ref 3.8–10.5)

## 2021-09-10 PROCEDURE — 99231 SBSQ HOSP IP/OBS SF/LOW 25: CPT

## 2021-09-10 RX ORDER — WARFARIN SODIUM 2.5 MG/1
5 TABLET ORAL ONCE
Refills: 0 | Status: COMPLETED | OUTPATIENT
Start: 2021-09-10 | End: 2021-09-10

## 2021-09-10 RX ADMIN — TAMSULOSIN HYDROCHLORIDE 0.8 MILLIGRAM(S): 0.4 CAPSULE ORAL at 21:16

## 2021-09-10 RX ADMIN — HEPARIN SODIUM 1100 UNIT(S)/HR: 5000 INJECTION INTRAVENOUS; SUBCUTANEOUS at 08:58

## 2021-09-10 RX ADMIN — Medication 2: at 12:03

## 2021-09-10 RX ADMIN — WARFARIN SODIUM 5 MILLIGRAM(S): 2.5 TABLET ORAL at 21:17

## 2021-09-10 RX ADMIN — ATORVASTATIN CALCIUM 40 MILLIGRAM(S): 80 TABLET, FILM COATED ORAL at 21:17

## 2021-09-10 RX ADMIN — INSULIN GLARGINE 25 UNIT(S): 100 INJECTION, SOLUTION SUBCUTANEOUS at 21:51

## 2021-09-10 RX ADMIN — Medication 650 MILLIGRAM(S): at 22:13

## 2021-09-10 RX ADMIN — Medication 0.5 MILLIGRAM(S): at 17:37

## 2021-09-10 RX ADMIN — MONTELUKAST 10 MILLIGRAM(S): 4 TABLET, CHEWABLE ORAL at 12:02

## 2021-09-10 RX ADMIN — Medication 6 UNIT(S): at 08:18

## 2021-09-10 RX ADMIN — FINASTERIDE 5 MILLIGRAM(S): 5 TABLET, FILM COATED ORAL at 12:02

## 2021-09-10 RX ADMIN — Medication 650 MILLIGRAM(S): at 22:45

## 2021-09-10 RX ADMIN — Medication 25 MICROGRAM(S): at 05:39

## 2021-09-10 RX ADMIN — Medication 0.5 MILLIGRAM(S): at 05:42

## 2021-09-10 RX ADMIN — Medication 25 MILLIGRAM(S): at 05:39

## 2021-09-10 RX ADMIN — PANTOPRAZOLE SODIUM 40 MILLIGRAM(S): 20 TABLET, DELAYED RELEASE ORAL at 05:39

## 2021-09-10 RX ADMIN — Medication 650 MILLIGRAM(S): at 03:28

## 2021-09-10 RX ADMIN — Medication 6 UNIT(S): at 12:03

## 2021-09-10 RX ADMIN — MUPIROCIN 1 APPLICATION(S): 20 OINTMENT TOPICAL at 05:42

## 2021-09-10 RX ADMIN — Medication 6 UNIT(S): at 17:35

## 2021-09-10 RX ADMIN — HEPARIN SODIUM 1100 UNIT(S)/HR: 5000 INJECTION INTRAVENOUS; SUBCUTANEOUS at 01:33

## 2021-09-10 RX ADMIN — MUPIROCIN 1 APPLICATION(S): 20 OINTMENT TOPICAL at 17:37

## 2021-09-10 RX ADMIN — Medication 650 MILLIGRAM(S): at 02:58

## 2021-09-10 NOTE — PROGRESS NOTE ADULT - SUBJECTIVE AND OBJECTIVE BOX
Patient is a 86y old  Male who presents with a chief complaint of R toe pain (10 Sep 2021 11:06)       INTERVAL HPI/OVERNIGHT EVENTS:  Patient seen and evaluated at bedside.  Pt is resting comfortable in NAD.    Allergies    No Known Allergies    Intolerances        Vital Signs Last 24 Hrs  T(C): 36.5 (10 Sep 2021 05:03), Max: 36.5 (10 Sep 2021 05:03)  T(F): 97.7 (10 Sep 2021 05:03), Max: 97.7 (10 Sep 2021 05:03)  HR: 69 (10 Sep 2021 05:03) (69 - 82)  BP: 104/60 (10 Sep 2021 05:03) (104/60 - 132/82)  BP(mean): --  RR: 18 (10 Sep 2021 05:03) (18 - 18)  SpO2: 96% (10 Sep 2021 05:03) (96% - 100%)    LABS:                        8.6    8.29  )-----------( 185      ( 10 Sep 2021 07:27 )             28.6     09-10    147<H>  |  107  |  53<H>  ----------------------------<  123<H>  4.1   |  27  |  1.65<H>    Ca    9.3      10 Sep 2021 07:27    TPro  5.7<L>  /  Alb  3.2<L>  /  TBili  0.3  /  DBili  x   /  AST  10  /  ALT  9<L>  /  AlkPhos  67  09-09    PT/INR - ( 10 Sep 2021 07:27 )   PT: 13.5 sec;   INR: 1.13 ratio         PTT - ( 10 Sep 2021 07:27 )  PTT:81.1 sec    CAPILLARY BLOOD GLUCOSE      POCT Blood Glucose.: 93 mg/dL (10 Sep 2021 17:30)  POCT Blood Glucose.: 204 mg/dL (10 Sep 2021 11:55)  POCT Blood Glucose.: 119 mg/dL (10 Sep 2021 08:16)  POCT Blood Glucose.: 272 mg/dL (09 Sep 2021 21:35)      Lower Extremity Physical Exam:  Vascular: DP/PT NP, B/L, CFT >3s seconds B/L, Temperature gradient B/L forefoot cool to touch   Neuro: Epicritic sensation intact to the level of digits B/L.  Musculoskeletal/Ortho: unremarkable    Skin: R forefoot cool to touch, w/ dependent rubor, 3rd digit dorsal erythema w/ no fluctuance, no acute signs of infection. L forefoot w/ dependent rubor, small dry eschars at distal digits 1, 2, 3. B/L posterior ankle eschars, dry and stable.  Edema B/L lower extremities.

## 2021-09-10 NOTE — PROGRESS NOTE ADULT - SUBJECTIVE AND OBJECTIVE BOX
[facility] - ORESTES Wall / ORESTES Wynne / KARIE Melendez/ ORESTES Knight/ ORESTES Jang/ ADRIA Lee / TOÑITO Mora / ROBB Kapoor  ---------------------------------------------------------------------------------------------------------------  seen and examined today for CKD  Interval : ANd  VITALS:  T(F): 97.7 (09-10-21 @ 05:03), Max: 97.7 (09-10-21 @ 05:03)  HR: 69 (09-10-21 @ 05:03)  BP: 104/60 (09-10-21 @ 05:03)  RR: 18 (09-10-21 @ 05:03)  SpO2: 96% (09-10-21 @ 05:03)  Wt(kg): --    09-09 @ 07:01  -  09-10 @ 07:00  --------------------------------------------------------  IN: 240 mL / OUT: 500 mL / NET: -260 mL    09-10 @ 07:01  -  09-10 @ 11:07  --------------------------------------------------------  IN: 240 mL / OUT: 0 mL / NET: 240 mL      Physical Exam :-  Constitutional: NAD  Neck: Supple.  Respiratory: Bilateral equal breath sounds,  Cardiovascular: S1, S2 normal,  Gastrointestinal: Bowel Sounds present, soft, non tender.  Extremities: No edema  Neurological: Alert and Oriented x 3, no focal deficits  Psychiatric: Normal mood, normal affect  Data:-  Allergies :   No Known Allergies    Hospital Medications:   MEDICATIONS  (STANDING):  atorvastatin 40 milliGRAM(s) Oral at bedtime  buDESOnide    Inhalation Suspension 0.5 milliGRAM(s) Inhalation two times a day  dextrose 50% Injectable 25 Gram(s) IV Push once  dextrose 50% Injectable 12.5 Gram(s) IV Push once  finasteride 5 milliGRAM(s) Oral daily  heparin  Infusion. 900 Unit(s)/Hr (9 mL/Hr) IV Continuous <Continuous>  insulin glargine Injectable (LANTUS) 25 Unit(s) SubCutaneous at bedtime  insulin lispro (ADMELOG) corrective regimen sliding scale   SubCutaneous at bedtime  insulin lispro (ADMELOG) corrective regimen sliding scale   SubCutaneous three times a day before meals  insulin lispro Injectable (ADMELOG) 6 Unit(s) SubCutaneous three times a day before meals  levothyroxine 25 MICROGram(s) Oral daily  metoprolol succinate ER 25 milliGRAM(s) Oral daily  montelukast 10 milliGRAM(s) Oral daily  mupirocin 2% Ointment 1 Application(s) Topical two times a day  pantoprazole    Tablet 40 milliGRAM(s) Oral before breakfast  sodium chloride 0.9%. 1000 milliLiter(s) (75 mL/Hr) IV Continuous <Continuous>  tamsulosin 0.8 milliGRAM(s) Oral at bedtime  warfarin 5 milliGRAM(s) Oral once    09-10    147<H>  |  107  |  53<H>  ----------------------------<  123<H>  4.1   |  27  |  1.65<H>    Ca    9.3      10 Sep 2021 07:27    TPro  5.7<L>  /  Alb  3.2<L>  /  TBili  0.3  /  DBili      /  AST  10  /  ALT  9<L>  /  AlkPhos  67  09-09    Creatinine Trend: 1.65 <--, 1.45 <--, 1.60 <--, 1.75 <--, 1.98 <--, 1.72 <--, 1.51 <--, 1.65 <--                        8.6    8.29  )-----------( 185      ( 10 Sep 2021 07:27 )             28.6

## 2021-09-10 NOTE — PROGRESS NOTE ADULT - ASSESSMENT
86M with PMH of HTN, HLD, Afib on coumadin, CHF, T2DM on insulin, COPD, CVA, BPH p/w R toe pain and ulcer after trauma.  Xray negative for OM with CKD stage 3 ( b/l cr around 1.7mg/dl) per records awaiting lower ext angio    1) CKD stage 3  Likely Hypertensive /Diabetic nephropathy  b/l cr appears to be ranging around 1.7mg/dl  Cr stable s/p Angio on 9/8/21  ua and urine lytes reviewed  pt was taken off lasix/metolazone/losartan/LR to optimize for Angio -> can resume on DC  trend cr daily  hypernatremia noted, encouraged to increase fluid intake    2) Hypertension  bp stable  monitor blood pressure      For any question, call:  Cell # [phone]  Pager # [phone]  Callback # [phone]

## 2021-09-10 NOTE — DISCHARGE NOTE NURSING/CASE MANAGEMENT/SOCIAL WORK - NSDCPEPTCAREGIVEDUMATLIST _GEN_ALL_CORE
Heart Failure/Diabetes/Influenza Vaccination Heart Failure/Diabetes/Warfarin/Coumadin/Influenza Vaccination

## 2021-09-10 NOTE — DISCHARGE NOTE NURSING/CASE MANAGEMENT/SOCIAL WORK - NSDCFUADDAPPT_GEN_ALL_CORE_FT
APPTS ARE READY TO BE MADE: [x ] YES    Best Family or Patient Contact (if needed):    Additional Information about above appointments (if needed):    1: Podiatry follow up   2: Vascular follow up outpatient for further intervention  3:     Other comments or requests:

## 2021-09-10 NOTE — DISCHARGE NOTE NURSING/CASE MANAGEMENT/SOCIAL WORK - NSDCVIVACCINE_GEN_ALL_CORE_FT
influenza, injectable, quadrivalent, preservative free; 11-Sep-2019 13:29; Jerry Modi (ORACIO); [employer]; [id number] (Exp. Date: 30-Jun-2020); IntraMuscular; Deltoid Left.; 0.5 milliLiter(s); VIS (VIS Published: 15-Aug-2019, VIS Presented: 11-Sep-2019);

## 2021-09-10 NOTE — DISCHARGE NOTE NURSING/CASE MANAGEMENT/SOCIAL WORK - PATIENT PORTAL LINK FT
You can access the FollowMyHealth Patient Portal offered by [facility] by registering at the following website: [email or web address]. By joining [employer]’s FollowMyHealth portal, you will also be able to view your health information using other applications (apps) compatible with our system.

## 2021-09-10 NOTE — PROGRESS NOTE ADULT - SUBJECTIVE AND OBJECTIVE BOX
Patient is a 86y old  Male who presents with a chief complaint of R toe pain (09 Sep 2021 12:50)      SUBJECTIVE / OVERNIGHT EVENTS:    Events noted.  CONSTITUTIONAL: No fever,  or fatigue  RESPIRATORY: No cough, wheezing,  No shortness of breath  CARDIOVASCULAR: No chest pain, palpitations  GASTROINTESTINAL: No abdominal or epigastric pain.   NEUROLOGICAL: No headaches,     MEDICATIONS  (STANDING):  atorvastatin 40 milliGRAM(s) Oral at bedtime  buDESOnide    Inhalation Suspension 0.5 milliGRAM(s) Inhalation two times a day  dextrose 50% Injectable 25 Gram(s) IV Push once  dextrose 50% Injectable 12.5 Gram(s) IV Push once  finasteride 5 milliGRAM(s) Oral daily  heparin  Infusion. 900 Unit(s)/Hr (9 mL/Hr) IV Continuous <Continuous>  insulin glargine Injectable (LANTUS) 25 Unit(s) SubCutaneous at bedtime  insulin lispro (ADMELOG) corrective regimen sliding scale   SubCutaneous at bedtime  insulin lispro (ADMELOG) corrective regimen sliding scale   SubCutaneous three times a day before meals  insulin lispro Injectable (ADMELOG) 6 Unit(s) SubCutaneous three times a day before meals  levothyroxine 25 MICROGram(s) Oral daily  metoprolol succinate ER 25 milliGRAM(s) Oral daily  montelukast 10 milliGRAM(s) Oral daily  mupirocin 2% Ointment 1 Application(s) Topical two times a day  pantoprazole    Tablet 40 milliGRAM(s) Oral before breakfast  sodium chloride 0.9%. 1000 milliLiter(s) (75 mL/Hr) IV Continuous <Continuous>  tamsulosin 0.8 milliGRAM(s) Oral at bedtime  warfarin 5 milliGRAM(s) Oral at bedtime    MEDICATIONS  (PRN):  acetaminophen   Tablet .. 650 milliGRAM(s) Oral every 6 hours PRN Mild Pain (1 - 3)  aluminum hydroxide/magnesium hydroxide/simethicone Suspension 30 milliLiter(s) Oral every 4 hours PRN Dyspepsia  heparin   Injectable 3500 Unit(s) IV Push every 6 hours PRN For aPTT between 40 - 57  melatonin 3 milliGRAM(s) Oral at bedtime PRN Insomnia  ondansetron Injectable 4 milliGRAM(s) IV Push every 8 hours PRN Nausea and/or Vomiting        CAPILLARY BLOOD GLUCOSE      POCT Blood Glucose.: 272 mg/dL (09 Sep 2021 21:35)  POCT Blood Glucose.: 171 mg/dL (09 Sep 2021 17:28)  POCT Blood Glucose.: 197 mg/dL (09 Sep 2021 12:24)  POCT Blood Glucose.: 195 mg/dL (09 Sep 2021 08:23)    I&O's Summary    08 Sep 2021 07:01  -  09 Sep 2021 07:00  --------------------------------------------------------  IN: 0 mL / OUT: 350 mL / NET: -350 mL    09 Sep 2021 07:01  -  09 Sep 2021 23:38  --------------------------------------------------------  IN: 240 mL / OUT: 400 mL / NET: -160 mL        T(C): 36.3 (09-09-21 @ 20:14), Max: 36.4 (09-09-21 @ 04:15)  HR: 82 (09-09-21 @ 20:14) (74 - 82)  BP: 132/82 (09-09-21 @ 20:14) (121/74 - 132/82)  RR: 18 (09-09-21 @ 20:14) (17 - 18)  SpO2: 100% (09-09-21 @ 20:14) (100% - 100%)    PHYSICAL EXAM:  GENERAL: NAD  NECK: Supple, No JVD  CHEST/LUNG: Clear to auscultation bilaterally; No wheezing.  HEART: Regular rate and rhythm; No murmurs, rubs, or gallops  ABDOMEN: Soft, Nontender, Nondistended; Bowel sounds present  EXTREMITIES:   No edema  NEUROLOGY: AAO X 3      LABS:                        8.6    8.16  )-----------( 183      ( 09 Sep 2021 07:07 )             28.2     09-09    145  |  105  |  52<H>  ----------------------------<  216<H>  4.0   |  27  |  1.45<H>    Ca    9.2      09 Sep 2021 07:07  Phos  3.1     09-08  Mg     2.2     09-08    TPro  5.7<L>  /  Alb  3.2<L>  /  TBili  0.3  /  DBili  x   /  AST  10  /  ALT  9<L>  /  AlkPhos  67  09-09    PT/INR - ( 09 Sep 2021 07:08 )   PT: 14.3 sec;   INR: 1.20 ratio         PTT - ( 09 Sep 2021 17:23 )  PTT:50.6 sec        CAPILLARY BLOOD GLUCOSE      POCT Blood Glucose.: 272 mg/dL (09 Sep 2021 21:35)  POCT Blood Glucose.: 171 mg/dL (09 Sep 2021 17:28)  POCT Blood Glucose.: 197 mg/dL (09 Sep 2021 12:24)  POCT Blood Glucose.: 195 mg/dL (09 Sep 2021 08:23)        RADIOLOGY & ADDITIONAL TESTS:    Imaging Personally Reviewed:    Consultant(s) Notes Reviewed:      Care Discussed with Consultants/Other Providers:    Graham Pulliam MD, CMD, FACP    [address] [place] [address]  Office Tel: [phone]  Cell: [phone]

## 2021-09-10 NOTE — PROGRESS NOTE ADULT - ASSESSMENT
87yo M w/ early ischemic changes B/L forefoot  -pt was seen and examined  - R forefoot with ischemic changes, w/ dependent rubor, 3rd digit dorsal erythema w/ no fluctuance, no acute signs of infection. L forefoot cool to touch w/ dependent rubor, small dry eschars at distal digits 1, 2, 3. B/L posterior ankle eschars, dry and stable.  Edema B/L lower extremities.  -No acute podiatric intervention at this time, pod stable for discharge  -Vasc recs appreciated  -follow up as outpatient upon discharge

## 2021-09-11 LAB
ANION GAP SERPL CALC-SCNC: 12 MMOL/L — SIGNIFICANT CHANGE UP (ref 5–17)
APTT BLD: 80.1 SEC — HIGH (ref 27.5–35.5)
BUN SERPL-MCNC: 44 MG/DL — HIGH (ref 7–23)
CALCIUM SERPL-MCNC: 9.3 MG/DL — SIGNIFICANT CHANGE UP (ref 8.4–10.5)
CHLORIDE SERPL-SCNC: 108 MMOL/L — SIGNIFICANT CHANGE UP (ref 96–108)
CO2 SERPL-SCNC: 26 MMOL/L — SIGNIFICANT CHANGE UP (ref 22–31)
CREAT SERPL-MCNC: 1.28 MG/DL — SIGNIFICANT CHANGE UP (ref 0.5–1.3)
GLUCOSE BLDC GLUCOMTR-MCNC: 140 MG/DL — HIGH (ref 70–99)
GLUCOSE BLDC GLUCOMTR-MCNC: 152 MG/DL — HIGH (ref 70–99)
GLUCOSE BLDC GLUCOMTR-MCNC: 204 MG/DL — HIGH (ref 70–99)
GLUCOSE BLDC GLUCOMTR-MCNC: 74 MG/DL — SIGNIFICANT CHANGE UP (ref 70–99)
GLUCOSE SERPL-MCNC: 73 MG/DL — SIGNIFICANT CHANGE UP (ref 70–99)
HCT VFR BLD CALC: 29.3 % — LOW (ref 39–50)
HGB BLD-MCNC: 8.9 G/DL — LOW (ref 13–17)
INR BLD: 1.18 RATIO — HIGH (ref 0.88–1.16)
MCHC RBC-ENTMCNC: 27.7 PG — SIGNIFICANT CHANGE UP (ref 27–34)
MCHC RBC-ENTMCNC: 30.4 GM/DL — LOW (ref 32–36)
MCV RBC AUTO: 91.3 FL — SIGNIFICANT CHANGE UP (ref 80–100)
NRBC # BLD: 0 /100 WBCS — SIGNIFICANT CHANGE UP (ref 0–0)
PLATELET # BLD AUTO: 166 K/UL — SIGNIFICANT CHANGE UP (ref 150–400)
POTASSIUM SERPL-MCNC: 4.1 MMOL/L — SIGNIFICANT CHANGE UP (ref 3.5–5.3)
POTASSIUM SERPL-SCNC: 4.1 MMOL/L — SIGNIFICANT CHANGE UP (ref 3.5–5.3)
PROTHROM AB SERPL-ACNC: 14.1 SEC — HIGH (ref 10.6–13.6)
RBC # BLD: 3.21 M/UL — LOW (ref 4.2–5.8)
RBC # FLD: 15.4 % — HIGH (ref 10.3–14.5)
SODIUM SERPL-SCNC: 146 MMOL/L — HIGH (ref 135–145)
WBC # BLD: 7.11 K/UL — SIGNIFICANT CHANGE UP (ref 3.8–10.5)
WBC # FLD AUTO: 7.11 K/UL — SIGNIFICANT CHANGE UP (ref 3.8–10.5)

## 2021-09-11 RX ORDER — WARFARIN SODIUM 2.5 MG/1
5 TABLET ORAL ONCE
Refills: 0 | Status: COMPLETED | OUTPATIENT
Start: 2021-09-11 | End: 2021-09-11

## 2021-09-11 RX ADMIN — INSULIN GLARGINE 25 UNIT(S): 100 INJECTION, SOLUTION SUBCUTANEOUS at 22:01

## 2021-09-11 RX ADMIN — Medication 25 MILLIGRAM(S): at 06:09

## 2021-09-11 RX ADMIN — Medication 25 MICROGRAM(S): at 06:09

## 2021-09-11 RX ADMIN — Medication 0.5 MILLIGRAM(S): at 17:38

## 2021-09-11 RX ADMIN — TAMSULOSIN HYDROCHLORIDE 0.8 MILLIGRAM(S): 0.4 CAPSULE ORAL at 21:59

## 2021-09-11 RX ADMIN — Medication 650 MILLIGRAM(S): at 20:06

## 2021-09-11 RX ADMIN — Medication 650 MILLIGRAM(S): at 06:45

## 2021-09-11 RX ADMIN — Medication 0.5 MILLIGRAM(S): at 06:13

## 2021-09-11 RX ADMIN — PANTOPRAZOLE SODIUM 40 MILLIGRAM(S): 20 TABLET, DELAYED RELEASE ORAL at 06:09

## 2021-09-11 RX ADMIN — MONTELUKAST 10 MILLIGRAM(S): 4 TABLET, CHEWABLE ORAL at 12:57

## 2021-09-11 RX ADMIN — MUPIROCIN 1 APPLICATION(S): 20 OINTMENT TOPICAL at 17:39

## 2021-09-11 RX ADMIN — Medication 6 UNIT(S): at 12:56

## 2021-09-11 RX ADMIN — Medication 6 UNIT(S): at 17:38

## 2021-09-11 RX ADMIN — Medication 650 MILLIGRAM(S): at 06:15

## 2021-09-11 RX ADMIN — Medication 1: at 12:56

## 2021-09-11 RX ADMIN — HEPARIN SODIUM 1100 UNIT(S)/HR: 5000 INJECTION INTRAVENOUS; SUBCUTANEOUS at 07:35

## 2021-09-11 RX ADMIN — ATORVASTATIN CALCIUM 40 MILLIGRAM(S): 80 TABLET, FILM COATED ORAL at 21:59

## 2021-09-11 RX ADMIN — FINASTERIDE 5 MILLIGRAM(S): 5 TABLET, FILM COATED ORAL at 12:56

## 2021-09-11 RX ADMIN — Medication 650 MILLIGRAM(S): at 20:51

## 2021-09-11 RX ADMIN — MUPIROCIN 1 APPLICATION(S): 20 OINTMENT TOPICAL at 06:13

## 2021-09-11 RX ADMIN — WARFARIN SODIUM 5 MILLIGRAM(S): 2.5 TABLET ORAL at 21:59

## 2021-09-11 NOTE — PROGRESS NOTE ADULT - SUBJECTIVE AND OBJECTIVE BOX
Patient is a 86y old  Male who presents with a chief complaint of R toe pain (11 Sep 2021 11:20)      SUBJECTIVE / OVERNIGHT EVENTS:    Events noted.  CONSTITUTIONAL: No fever,  or fatigue  RESPIRATORY: No cough, wheezing,  No shortness of breath  CARDIOVASCULAR: No chest pain, palpitations  GASTROINTESTINAL: No abdominal or epigastric pain.   NEUROLOGICAL: No headaches,     MEDICATIONS  (STANDING):  atorvastatin 40 milliGRAM(s) Oral at bedtime  buDESOnide    Inhalation Suspension 0.5 milliGRAM(s) Inhalation two times a day  dextrose 50% Injectable 25 Gram(s) IV Push once  dextrose 50% Injectable 12.5 Gram(s) IV Push once  finasteride 5 milliGRAM(s) Oral daily  heparin  Infusion. 900 Unit(s)/Hr (9 mL/Hr) IV Continuous <Continuous>  insulin glargine Injectable (LANTUS) 25 Unit(s) SubCutaneous at bedtime  insulin lispro (ADMELOG) corrective regimen sliding scale   SubCutaneous at bedtime  insulin lispro (ADMELOG) corrective regimen sliding scale   SubCutaneous three times a day before meals  insulin lispro Injectable (ADMELOG) 6 Unit(s) SubCutaneous three times a day before meals  levothyroxine 25 MICROGram(s) Oral daily  metoprolol succinate ER 25 milliGRAM(s) Oral daily  montelukast 10 milliGRAM(s) Oral daily  mupirocin 2% Ointment 1 Application(s) Topical two times a day  pantoprazole    Tablet 40 milliGRAM(s) Oral before breakfast  sodium chloride 0.9%. 1000 milliLiter(s) (75 mL/Hr) IV Continuous <Continuous>  tamsulosin 0.8 milliGRAM(s) Oral at bedtime  warfarin 5 milliGRAM(s) Oral once    MEDICATIONS  (PRN):  acetaminophen   Tablet .. 650 milliGRAM(s) Oral every 6 hours PRN Mild Pain (1 - 3)  aluminum hydroxide/magnesium hydroxide/simethicone Suspension 30 milliLiter(s) Oral every 4 hours PRN Dyspepsia  heparin   Injectable 3500 Unit(s) IV Push every 6 hours PRN For aPTT between 40 - 57  melatonin 3 milliGRAM(s) Oral at bedtime PRN Insomnia  ondansetron Injectable 4 milliGRAM(s) IV Push every 8 hours PRN Nausea and/or Vomiting        CAPILLARY BLOOD GLUCOSE      POCT Blood Glucose.: 140 mg/dL (11 Sep 2021 17:24)  POCT Blood Glucose.: 152 mg/dL (11 Sep 2021 12:41)  POCT Blood Glucose.: 74 mg/dL (11 Sep 2021 08:24)  POCT Blood Glucose.: 204 mg/dL (10 Sep 2021 21:29)    I&O's Summary    10 Sep 2021 07:01  -  11 Sep 2021 07:00  --------------------------------------------------------  IN: 762 mL / OUT: 350 mL / NET: 412 mL    11 Sep 2021 07:01  -  11 Sep 2021 19:01  --------------------------------------------------------  IN: 600 mL / OUT: 450 mL / NET: 150 mL        T(C): 36.6 (09-11-21 @ 13:25), Max: 36.6 (09-11-21 @ 13:25)  HR: 72 (09-11-21 @ 13:25) (69 - 74)  BP: 145/78 (09-11-21 @ 13:25) (145/78 - 152/67)  RR: 18 (09-11-21 @ 13:25) (18 - 18)  SpO2: 98% (09-11-21 @ 13:25) (97% - 99%)    PHYSICAL EXAM:    NECK: Supple, No JVD  CHEST/LUNG: Clear to auscultation bilaterally; No wheezing.  HEART: Regular rate and rhythm; No murmurs, rubs, or gallops  ABDOMEN: Soft, Nontender, Nondistended; Bowel sounds present  EXTREMITIES:   No edema  NEUROLOGY: AAO       LABS:                        8.9    7.11  )-----------( 166      ( 11 Sep 2021 06:46 )             29.3     09-11    146<H>  |  108  |  44<H>  ----------------------------<  73  4.1   |  26  |  1.28    Ca    9.3      11 Sep 2021 06:45      PT/INR - ( 11 Sep 2021 06:46 )   PT: 14.1 sec;   INR: 1.18 ratio         PTT - ( 11 Sep 2021 06:46 )  PTT:80.1 sec        CAPILLARY BLOOD GLUCOSE      POCT Blood Glucose.: 140 mg/dL (11 Sep 2021 17:24)  POCT Blood Glucose.: 152 mg/dL (11 Sep 2021 12:41)  POCT Blood Glucose.: 74 mg/dL (11 Sep 2021 08:24)  POCT Blood Glucose.: 204 mg/dL (10 Sep 2021 21:29)        RADIOLOGY & ADDITIONAL TESTS:    Imaging Personally Reviewed:    Consultant(s) Notes Reviewed:      Care Discussed with Consultants/Other Providers:    Graham Pulliam MD, CMD, FACP    [address] [place] [address]  Office Tel: [phone]  Cell: [phone]

## 2021-09-11 NOTE — PROGRESS NOTE ADULT - SUBJECTIVE AND OBJECTIVE BOX
[facility] : Ans Serv [phone], Office [phone]  Dr Melendez/Dr Wall  /Dr Alex butt /Dr SHAHANA Knight/Dr Joshua Jang/Dr Jeremi Lee /Dr KENDALL Mora  _______________________________________________________________________________________________    seen and examined today for renal failure  Interval : improving Serum Creatinine level   VITALS:  T(F): 97.7 (09-11-21 @ 05:21), Max: 97.7 (09-11-21 @ 05:21)  HR: 69 (09-11-21 @ 05:21)  BP: 152/66 (09-11-21 @ 05:21)  RR: 18 (09-11-21 @ 05:21)  SpO2: 97% (09-11-21 @ 05:21)    09-10 @ 07:01  -  09-11 @ 07:00  --------------------------------------------------------  IN: 762 mL / OUT: 350 mL / NET: 412 mL    Physical Exam :-  Constitutional: NAD  Neck: Supple.  Respiratory: Bilateral equal breath sounds, few Crackles present.  Cardiovascular: S1, S2 normal, positive Murmur  Gastrointestinal: Bowel Sounds present, soft, non tender.  Extremities: no Edema Feet  Neurological: Alert   Psychiatric: Normal mood, normal affect  Data:-  Allergies :   No Known Allergies    Hospital Medications:   MEDICATIONS  (STANDING):  atorvastatin 40 milliGRAM(s) Oral at bedtime  buDESOnide    Inhalation Suspension 0.5 milliGRAM(s) Inhalation two times a day  dextrose 50% Injectable 25 Gram(s) IV Push once  dextrose 50% Injectable 12.5 Gram(s) IV Push once  finasteride 5 milliGRAM(s) Oral daily  heparin  Infusion. 900 Unit(s)/Hr (9 mL/Hr) IV Continuous <Continuous>  insulin glargine Injectable (LANTUS) 25 Unit(s) SubCutaneous at bedtime  insulin lispro (ADMELOG) corrective regimen sliding scale   SubCutaneous at bedtime  insulin lispro (ADMELOG) corrective regimen sliding scale   SubCutaneous three times a day before meals  insulin lispro Injectable (ADMELOG) 6 Unit(s) SubCutaneous three times a day before meals  levothyroxine 25 MICROGram(s) Oral daily  metoprolol succinate ER 25 milliGRAM(s) Oral daily  montelukast 10 milliGRAM(s) Oral daily  mupirocin 2% Ointment 1 Application(s) Topical two times a day  pantoprazole    Tablet 40 milliGRAM(s) Oral before breakfast  sodium chloride 0.9%. 1000 milliLiter(s) (75 mL/Hr) IV Continuous <Continuous>  tamsulosin 0.8 milliGRAM(s) Oral at bedtime  warfarin 5 milliGRAM(s) Oral once    09-11    146<H>  |  108  |  44<H>  ----------------------------<  73  4.1   |  26  |  1.28    Ca    9.3      11 Sep 2021 06:45      Creatinine Trend: 1.28 <--, 1.65 <--, 1.45 <--, 1.60 <--, 1.75 <--, 1.98 <--, 1.72 <--                        8.9    7.11  )-----------( 166      ( 11 Sep 2021 06:46 )             29.3

## 2021-09-11 NOTE — PROGRESS NOTE ADULT - ASSESSMENT
86M with PMH of HTN, HLD, Afib on coumadin, CHF, T2DM on insulin, COPD, CVA, BPH p/w R toe pain and ulcer after trauma.  Xray negative for OM with CKD stage 3 ( b/l cr around 1.7mg/dl) per records awaiting lower ext angio    1) CKD stage 3  Likely Hypertensive /Diabetic nephropathy  b/l cr appears to be ranging around 1.7mg/dl  Cr stable s/p Angio on 9/8/21  ua and urine lytes reviewed    Plan  pt was taken off lasix/metolazone/losartan/LR to optimize for Angio -> can resume on DC  monitor basic metabolic panel daily   hypernatremia improving serum na level,     2) Hypertension  bp stable  monitor blood pressure

## 2021-09-12 LAB
APTT BLD: 107.2 SEC — HIGH (ref 27.5–35.5)
APTT BLD: 39.7 SEC — HIGH (ref 27.5–35.5)
APTT BLD: 60.8 SEC — HIGH (ref 27.5–35.5)
GLUCOSE BLDC GLUCOMTR-MCNC: 129 MG/DL — HIGH (ref 70–99)
GLUCOSE BLDC GLUCOMTR-MCNC: 140 MG/DL — HIGH (ref 70–99)
GLUCOSE BLDC GLUCOMTR-MCNC: 265 MG/DL — HIGH (ref 70–99)
GLUCOSE BLDC GLUCOMTR-MCNC: 72 MG/DL — SIGNIFICANT CHANGE UP (ref 70–99)
HCT VFR BLD CALC: 31.1 % — LOW (ref 39–50)
HGB BLD-MCNC: 9.3 G/DL — LOW (ref 13–17)
INR BLD: 1.22 RATIO — HIGH (ref 0.88–1.16)
MCHC RBC-ENTMCNC: 27.5 PG — SIGNIFICANT CHANGE UP (ref 27–34)
MCHC RBC-ENTMCNC: 29.9 GM/DL — LOW (ref 32–36)
MCV RBC AUTO: 92 FL — SIGNIFICANT CHANGE UP (ref 80–100)
NRBC # BLD: 0 /100 WBCS — SIGNIFICANT CHANGE UP (ref 0–0)
PLATELET # BLD AUTO: 163 K/UL — SIGNIFICANT CHANGE UP (ref 150–400)
PROTHROM AB SERPL-ACNC: 14.5 SEC — HIGH (ref 10.6–13.6)
RBC # BLD: 3.38 M/UL — LOW (ref 4.2–5.8)
RBC # FLD: 15.7 % — HIGH (ref 10.3–14.5)
WBC # BLD: 7.05 K/UL — SIGNIFICANT CHANGE UP (ref 3.8–10.5)
WBC # FLD AUTO: 7.05 K/UL — SIGNIFICANT CHANGE UP (ref 3.8–10.5)

## 2021-09-12 RX ORDER — WARFARIN SODIUM 2.5 MG/1
10 TABLET ORAL ONCE
Refills: 0 | Status: DISCONTINUED | OUTPATIENT
Start: 2021-09-12 | End: 2021-09-12

## 2021-09-12 RX ORDER — WARFARIN SODIUM 2.5 MG/1
7.5 TABLET ORAL ONCE
Refills: 0 | Status: COMPLETED | OUTPATIENT
Start: 2021-09-12 | End: 2021-09-12

## 2021-09-12 RX ADMIN — HEPARIN SODIUM 3500 UNIT(S): 5000 INJECTION INTRAVENOUS; SUBCUTANEOUS at 21:28

## 2021-09-12 RX ADMIN — PANTOPRAZOLE SODIUM 40 MILLIGRAM(S): 20 TABLET, DELAYED RELEASE ORAL at 06:18

## 2021-09-12 RX ADMIN — HEPARIN SODIUM 900 UNIT(S)/HR: 5000 INJECTION INTRAVENOUS; SUBCUTANEOUS at 06:34

## 2021-09-12 RX ADMIN — Medication 650 MILLIGRAM(S): at 15:43

## 2021-09-12 RX ADMIN — Medication 0.5 MILLIGRAM(S): at 05:08

## 2021-09-12 RX ADMIN — MUPIROCIN 1 APPLICATION(S): 20 OINTMENT TOPICAL at 05:08

## 2021-09-12 RX ADMIN — MONTELUKAST 10 MILLIGRAM(S): 4 TABLET, CHEWABLE ORAL at 12:33

## 2021-09-12 RX ADMIN — HEPARIN SODIUM 1100 UNIT(S)/HR: 5000 INJECTION INTRAVENOUS; SUBCUTANEOUS at 21:28

## 2021-09-12 RX ADMIN — TAMSULOSIN HYDROCHLORIDE 0.8 MILLIGRAM(S): 0.4 CAPSULE ORAL at 21:22

## 2021-09-12 RX ADMIN — HEPARIN SODIUM 900 UNIT(S)/HR: 5000 INJECTION INTRAVENOUS; SUBCUTANEOUS at 14:34

## 2021-09-12 RX ADMIN — Medication 0.5 MILLIGRAM(S): at 17:42

## 2021-09-12 RX ADMIN — Medication 25 MICROGRAM(S): at 05:08

## 2021-09-12 RX ADMIN — Medication 650 MILLIGRAM(S): at 22:52

## 2021-09-12 RX ADMIN — ATORVASTATIN CALCIUM 40 MILLIGRAM(S): 80 TABLET, FILM COATED ORAL at 21:22

## 2021-09-12 RX ADMIN — Medication 650 MILLIGRAM(S): at 23:37

## 2021-09-12 RX ADMIN — Medication 1: at 21:24

## 2021-09-12 RX ADMIN — Medication 650 MILLIGRAM(S): at 17:41

## 2021-09-12 RX ADMIN — Medication 6 UNIT(S): at 17:40

## 2021-09-12 RX ADMIN — FINASTERIDE 5 MILLIGRAM(S): 5 TABLET, FILM COATED ORAL at 12:33

## 2021-09-12 RX ADMIN — Medication 25 MILLIGRAM(S): at 05:08

## 2021-09-12 RX ADMIN — WARFARIN SODIUM 7.5 MILLIGRAM(S): 2.5 TABLET ORAL at 21:22

## 2021-09-12 RX ADMIN — INSULIN GLARGINE 25 UNIT(S): 100 INJECTION, SOLUTION SUBCUTANEOUS at 21:22

## 2021-09-12 NOTE — PROGRESS NOTE ADULT - SUBJECTIVE AND OBJECTIVE BOX
[facility] : Ans Serv [phone], Office [phone]  Dr Melendez/Dr Wall  /Dr Alex butt /Dr SHAHANA Knight/Dr Joshua Jang/Dr Jeremi Lee /Dr KENDALL Mora  _______________________________________________________________________________________________    seen and examined today for Chronic Kidney Disease   Interval : Serum Creatinine stable  VITALS:  T(F): 97.6 (09-12-21 @ 04:42), Max: 97.8 (09-11-21 @ 13:25)  HR: 84 (09-12-21 @ 04:42)  BP: 158/77 (09-12-21 @ 04:42)  RR: 18 (09-12-21 @ 04:42)  SpO2: 98% (09-12-21 @ 04:42)    09-11 @ 07:01  -  09-12 @ 07:00  --------------------------------------------------------  IN: 792 mL / OUT: 650 mL / NET: 142 mL    Physical Exam :-  Constitutional: NAD  Neck: Supple.  Respiratory: Bilateral equal breath sounds, no Crackles present.  Cardiovascular: S1, S2 normal, positive Murmur  Gastrointestinal: Bowel Sounds present, soft, non tender.  Extremities: no Edema Feet  Neurological: Alert   Psychiatric: Normal mood, normal affect  Data:-  Allergies :   No Known Allergies    Hospital Medications:   MEDICATIONS  (STANDING):  atorvastatin 40 milliGRAM(s) Oral at bedtime  buDESOnide    Inhalation Suspension 0.5 milliGRAM(s) Inhalation two times a day  dextrose 50% Injectable 25 Gram(s) IV Push once  dextrose 50% Injectable 12.5 Gram(s) IV Push once  finasteride 5 milliGRAM(s) Oral daily  heparin  Infusion. 900 Unit(s)/Hr (9 mL/Hr) IV Continuous <Continuous>  insulin glargine Injectable (LANTUS) 25 Unit(s) SubCutaneous at bedtime  insulin lispro (ADMELOG) corrective regimen sliding scale   SubCutaneous three times a day before meals  insulin lispro (ADMELOG) corrective regimen sliding scale   SubCutaneous at bedtime  insulin lispro Injectable (ADMELOG) 6 Unit(s) SubCutaneous three times a day before meals  levothyroxine 25 MICROGram(s) Oral daily  metoprolol succinate ER 25 milliGRAM(s) Oral daily  montelukast 10 milliGRAM(s) Oral daily  mupirocin 2% Ointment 1 Application(s) Topical two times a day  pantoprazole    Tablet 40 milliGRAM(s) Oral before breakfast  sodium chloride 0.9%. 1000 milliLiter(s) (75 mL/Hr) IV Continuous <Continuous>  tamsulosin 0.8 milliGRAM(s) Oral at bedtime  warfarin 7.5 milliGRAM(s) Oral once    09-11    146<H>  |  108  |  44<H>  ----------------------------<  73  4.1   |  26  |  1.28    Ca    9.3      11 Sep 2021 06:45      Creatinine Trend: 1.28 <--, 1.65 <--, 1.45 <--, 1.60 <--, 1.75 <--, 1.98 <--                        9.3    7.05  )-----------( 163      ( 12 Sep 2021 07:18 )             31.1

## 2021-09-12 NOTE — PROGRESS NOTE ADULT - NSPROGADDITIONALINFOA_GEN_ALL_CORE
contact with question   cell [phone]  [facility]- [phone]
Dr Melendez  Nephrology   cell [phone]  office [phone]  [facility]- [phone]  [email or web address] ( wkend coverage for [facility])
Dw family.

## 2021-09-12 NOTE — PROGRESS NOTE ADULT - SUBJECTIVE AND OBJECTIVE BOX
Patient is a 86y old  Male who presents with a chief complaint of R toe pain (12 Sep 2021 11:59)      SUBJECTIVE / OVERNIGHT EVENTS:    Events noted.  CONSTITUTIONAL: No fever,  or fatigue  RESPIRATORY: No cough, wheezing,  No shortness of breath  CARDIOVASCULAR: No chest pain, palpitations, dizziness, or leg swelling  GASTROINTESTINAL: No abdominal or epigastric pain. No nausea, vomiting.  NEUROLOGICAL: No headaches,     MEDICATIONS  (STANDING):  atorvastatin 40 milliGRAM(s) Oral at bedtime  buDESOnide    Inhalation Suspension 0.5 milliGRAM(s) Inhalation two times a day  dextrose 50% Injectable 25 Gram(s) IV Push once  dextrose 50% Injectable 12.5 Gram(s) IV Push once  finasteride 5 milliGRAM(s) Oral daily  heparin  Infusion. 900 Unit(s)/Hr (9 mL/Hr) IV Continuous <Continuous>  insulin glargine Injectable (LANTUS) 25 Unit(s) SubCutaneous at bedtime  insulin lispro (ADMELOG) corrective regimen sliding scale   SubCutaneous three times a day before meals  insulin lispro (ADMELOG) corrective regimen sliding scale   SubCutaneous at bedtime  insulin lispro Injectable (ADMELOG) 6 Unit(s) SubCutaneous three times a day before meals  levothyroxine 25 MICROGram(s) Oral daily  metoprolol succinate ER 25 milliGRAM(s) Oral daily  montelukast 10 milliGRAM(s) Oral daily  mupirocin 2% Ointment 1 Application(s) Topical two times a day  pantoprazole    Tablet 40 milliGRAM(s) Oral before breakfast  sodium chloride 0.9%. 1000 milliLiter(s) (75 mL/Hr) IV Continuous <Continuous>  tamsulosin 0.8 milliGRAM(s) Oral at bedtime    MEDICATIONS  (PRN):  acetaminophen   Tablet .. 650 milliGRAM(s) Oral every 6 hours PRN Mild Pain (1 - 3)  aluminum hydroxide/magnesium hydroxide/simethicone Suspension 30 milliLiter(s) Oral every 4 hours PRN Dyspepsia  heparin   Injectable 3500 Unit(s) IV Push every 6 hours PRN For aPTT between 40 - 57  melatonin 3 milliGRAM(s) Oral at bedtime PRN Insomnia  ondansetron Injectable 4 milliGRAM(s) IV Push every 8 hours PRN Nausea and/or Vomiting        CAPILLARY BLOOD GLUCOSE      POCT Blood Glucose.: 265 mg/dL (12 Sep 2021 21:15)  POCT Blood Glucose.: 129 mg/dL (12 Sep 2021 17:27)  POCT Blood Glucose.: 140 mg/dL (12 Sep 2021 12:39)  POCT Blood Glucose.: 72 mg/dL (12 Sep 2021 08:38)    I&O's Summary    11 Sep 2021 07:01  -  12 Sep 2021 07:00  --------------------------------------------------------  IN: 792 mL / OUT: 650 mL / NET: 142 mL        T(C): 36.8 (09-12-21 @ 21:40), Max: 36.8 (09-12-21 @ 21:40)  HR: 81 (09-12-21 @ 21:40) (81 - 87)  BP: 141/65 (09-12-21 @ 21:40) (141/65 - 158/77)  RR: 18 (09-12-21 @ 21:40) (18 - 18)  SpO2: 96% (09-12-21 @ 21:40) (96% - 98%)    PHYSICAL EXAM:  GENERAL: NAD  NECK: Supple, No JVD  CHEST/LUNG: Clear to auscultation bilaterally; No wheezing.  HEART: Regular rate and rhythm; No murmurs, rubs, or gallops  ABDOMEN: Soft, Nontender, Nondistended; Bowel sounds present  EXTREMITIES:   No edema  NEUROLOGY: AAO       LABS:                        9.3    7.05  )-----------( 163      ( 12 Sep 2021 07:18 )             31.1     09-11    146<H>  |  108  |  44<H>  ----------------------------<  73  4.1   |  26  |  1.28    Ca    9.3      11 Sep 2021 06:45      PT/INR - ( 12 Sep 2021 07:20 )   PT: 14.5 sec;   INR: 1.22 ratio         PTT - ( 12 Sep 2021 21:00 )  PTT:39.7 sec        CAPILLARY BLOOD GLUCOSE      POCT Blood Glucose.: 265 mg/dL (12 Sep 2021 21:15)  POCT Blood Glucose.: 129 mg/dL (12 Sep 2021 17:27)  POCT Blood Glucose.: 140 mg/dL (12 Sep 2021 12:39)  POCT Blood Glucose.: 72 mg/dL (12 Sep 2021 08:38)        RADIOLOGY & ADDITIONAL TESTS:    Imaging Personally Reviewed:    Consultant(s) Notes Reviewed:      Care Discussed with Consultants/Other Providers:    Graham Pulliam MD, CMD, FACP    [address] [place] [address]  Office Tel: [phone]  Cell: [phone]

## 2021-09-13 LAB
ANION GAP SERPL CALC-SCNC: 11 MMOL/L — SIGNIFICANT CHANGE UP (ref 5–17)
APTT BLD: 83 SEC — HIGH (ref 27.5–35.5)
APTT BLD: 85.2 SEC — HIGH (ref 27.5–35.5)
BUN SERPL-MCNC: 42 MG/DL — HIGH (ref 7–23)
CALCIUM SERPL-MCNC: 9.4 MG/DL — SIGNIFICANT CHANGE UP (ref 8.4–10.5)
CHLORIDE SERPL-SCNC: 106 MMOL/L — SIGNIFICANT CHANGE UP (ref 96–108)
CO2 SERPL-SCNC: 25 MMOL/L — SIGNIFICANT CHANGE UP (ref 22–31)
CREAT SERPL-MCNC: 1.46 MG/DL — HIGH (ref 0.5–1.3)
GLUCOSE BLDC GLUCOMTR-MCNC: 116 MG/DL — HIGH (ref 70–99)
GLUCOSE BLDC GLUCOMTR-MCNC: 134 MG/DL — HIGH (ref 70–99)
GLUCOSE BLDC GLUCOMTR-MCNC: 188 MG/DL — HIGH (ref 70–99)
GLUCOSE BLDC GLUCOMTR-MCNC: 256 MG/DL — HIGH (ref 70–99)
GLUCOSE SERPL-MCNC: 148 MG/DL — HIGH (ref 70–99)
HCT VFR BLD CALC: 28.9 % — LOW (ref 39–50)
HGB BLD-MCNC: 8.8 G/DL — LOW (ref 13–17)
INR BLD: 1.35 RATIO — HIGH (ref 0.88–1.16)
MCHC RBC-ENTMCNC: 28 PG — SIGNIFICANT CHANGE UP (ref 27–34)
MCHC RBC-ENTMCNC: 30.4 GM/DL — LOW (ref 32–36)
MCV RBC AUTO: 92 FL — SIGNIFICANT CHANGE UP (ref 80–100)
NRBC # BLD: 0 /100 WBCS — SIGNIFICANT CHANGE UP (ref 0–0)
PLATELET # BLD AUTO: 170 K/UL — SIGNIFICANT CHANGE UP (ref 150–400)
POTASSIUM SERPL-MCNC: 4.2 MMOL/L — SIGNIFICANT CHANGE UP (ref 3.5–5.3)
POTASSIUM SERPL-SCNC: 4.2 MMOL/L — SIGNIFICANT CHANGE UP (ref 3.5–5.3)
PROTHROM AB SERPL-ACNC: 16 SEC — HIGH (ref 10.6–13.6)
RBC # BLD: 3.14 M/UL — LOW (ref 4.2–5.8)
RBC # FLD: 15.6 % — HIGH (ref 10.3–14.5)
SODIUM SERPL-SCNC: 142 MMOL/L — SIGNIFICANT CHANGE UP (ref 135–145)
WBC # BLD: 8.85 K/UL — SIGNIFICANT CHANGE UP (ref 3.8–10.5)
WBC # FLD AUTO: 8.85 K/UL — SIGNIFICANT CHANGE UP (ref 3.8–10.5)

## 2021-09-13 PROCEDURE — 93283 PRGRMG EVAL IMPLANTABLE DFB: CPT | Mod: 26

## 2021-09-13 RX ORDER — WARFARIN SODIUM 2.5 MG/1
7.5 TABLET ORAL ONCE
Refills: 0 | Status: COMPLETED | OUTPATIENT
Start: 2021-09-13 | End: 2021-09-13

## 2021-09-13 RX ORDER — INSULIN GLARGINE 100 [IU]/ML
22 INJECTION, SOLUTION SUBCUTANEOUS AT BEDTIME
Refills: 0 | Status: DISCONTINUED | OUTPATIENT
Start: 2021-09-13 | End: 2021-09-15

## 2021-09-13 RX ORDER — ENOXAPARIN SODIUM 100 MG/ML
90 INJECTION SUBCUTANEOUS
Refills: 0 | Status: DISCONTINUED | OUTPATIENT
Start: 2021-09-13 | End: 2021-09-15

## 2021-09-13 RX ADMIN — FINASTERIDE 5 MILLIGRAM(S): 5 TABLET, FILM COATED ORAL at 11:56

## 2021-09-13 RX ADMIN — Medication 25 MILLIGRAM(S): at 05:02

## 2021-09-13 RX ADMIN — INSULIN GLARGINE 22 UNIT(S): 100 INJECTION, SOLUTION SUBCUTANEOUS at 21:52

## 2021-09-13 RX ADMIN — MUPIROCIN 1 APPLICATION(S): 20 OINTMENT TOPICAL at 17:43

## 2021-09-13 RX ADMIN — HEPARIN SODIUM 1100 UNIT(S)/HR: 5000 INJECTION INTRAVENOUS; SUBCUTANEOUS at 04:38

## 2021-09-13 RX ADMIN — ENOXAPARIN SODIUM 90 MILLIGRAM(S): 100 INJECTION SUBCUTANEOUS at 23:42

## 2021-09-13 RX ADMIN — MUPIROCIN 1 APPLICATION(S): 20 OINTMENT TOPICAL at 05:09

## 2021-09-13 RX ADMIN — MONTELUKAST 10 MILLIGRAM(S): 4 TABLET, CHEWABLE ORAL at 11:53

## 2021-09-13 RX ADMIN — WARFARIN SODIUM 7.5 MILLIGRAM(S): 2.5 TABLET ORAL at 21:56

## 2021-09-13 RX ADMIN — ENOXAPARIN SODIUM 90 MILLIGRAM(S): 100 INJECTION SUBCUTANEOUS at 12:13

## 2021-09-13 RX ADMIN — Medication 0.5 MILLIGRAM(S): at 05:03

## 2021-09-13 RX ADMIN — Medication 3: at 12:12

## 2021-09-13 RX ADMIN — ATORVASTATIN CALCIUM 40 MILLIGRAM(S): 80 TABLET, FILM COATED ORAL at 21:52

## 2021-09-13 RX ADMIN — Medication 6 UNIT(S): at 09:00

## 2021-09-13 RX ADMIN — Medication 6 UNIT(S): at 18:10

## 2021-09-13 RX ADMIN — Medication 650 MILLIGRAM(S): at 16:33

## 2021-09-13 RX ADMIN — Medication 0.5 MILLIGRAM(S): at 17:37

## 2021-09-13 RX ADMIN — Medication 650 MILLIGRAM(S): at 17:35

## 2021-09-13 RX ADMIN — Medication 650 MILLIGRAM(S): at 23:00

## 2021-09-13 RX ADMIN — Medication 6 UNIT(S): at 12:12

## 2021-09-13 RX ADMIN — PANTOPRAZOLE SODIUM 40 MILLIGRAM(S): 20 TABLET, DELAYED RELEASE ORAL at 05:02

## 2021-09-13 RX ADMIN — Medication 650 MILLIGRAM(S): at 22:28

## 2021-09-13 RX ADMIN — Medication 25 MICROGRAM(S): at 05:02

## 2021-09-13 RX ADMIN — TAMSULOSIN HYDROCHLORIDE 0.8 MILLIGRAM(S): 0.4 CAPSULE ORAL at 21:53

## 2021-09-13 NOTE — PHARMACOTHERAPY INTERVENTION NOTE - COMMENTS
87 yo M with DM A1C 7.4 on glipizide, meformin, and lantus 30 HS at home, admitted for R toe pain. Currently on lantus 25 units SQ HS, admelog 6 units SQ TID, and admelog low scale. BG in past 24 hours were 134, 265, 129, 140; AM BGs have been trending low in past 3 days 134, 72, 74. Recommend decreasing lantus to 22 units SQ TID.    Wilder Bangura, PharmD, BCPS  [phone]

## 2021-09-13 NOTE — CHART NOTE - NSCHARTNOTEFT_GEN_A_CORE
-Received call from RN to state that PTT was 49.8 sec. with  Heparin dose @ 9 mL/H, vs. .8 at same dose 9/6 eight hrs. earlier  -RN requested to re-weigh pt., and to send a rpt STAT PTT  -Pt's new wt. is now 89.4 kg, less than what was entered on admission   Rpt PTT is 105.4  -Based on Heparin Full Anticoagulation nomogram, based on pt's wt. & PTT value, instructions are to decrease drip by 2 mL/H  -As such, new orders entered for Heparin gtt - Full Anticoagulation, to start @ 7 mL/H, and will rpt PTT in 6 hrs. to maintain it bet. 58-99 sec.    -Above discussed with RN  -Will endorse to day team
Patient seen and examined at bedside. Clinically stable. Complains of intermittent right toe pain.    Physical Exam:  General: AAOx2. Appears stated age. No acute distress  Neck: Supple  Respiratory: Non labored  Chest: Regular rate, rhythm  Vascular: 2+ femoral pulses palpable bilaterally  Extremities: Stable ulceration of right 4th toe. Ischemic appearing right 2nd toe. Gangrenous changes to right 3rd toe.    Assessment: CLTI of RLE, Eliazar V    Plan:  - Nephrology consult for recommendations for perioperative optimization in setting of chronic kidney disease   - Awaiting ALMAZ/PVRs with toe pressures  - Tentatively scheduled for OR tomorrow for RLE angiogram, possible revascularization
SURGERY POST-OP NOTE:    S: Patient underwent and tolerated procedure without issue and sent to PACU then floor. Patient denies chest pain, shortness of breath, nausea, vomiting, lightheadedness, or dizziness. Pain was well controlled.      O:  T(C): 36.1 (09-08-21 @ 13:30), Max: 36.1 (09-08-21 @ 13:30)  HR: 72 (09-08-21 @ 14:19) (68 - 78)  BP: 138/80 (09-08-21 @ 14:19) (138/80 - 151/67)  RR: 17 (09-08-21 @ 14:19) (16 - 17)  SpO2: 100% (09-08-21 @ 14:19) (93% - 100%)  Wt(kg): --                        9.4    8.05  )-----------( 188      ( 08 Sep 2021 05:17 )             30.1        09-08    145  |  104  |  58<H>  ----------------------------<  190<H>  3.7   |  29  |  1.60<H>    Ca    9.7      08 Sep 2021 05:17  Phos  3.1     09-08  Mg     2.2     09-08      Gen: NAD, resting in bed, alert and responding appropriately  Resp: Non-labored respirations on RA  Abd: Soft, nontender, nondistended  Ext: L groin soft, dressing c/d/i. No swelling, ecchymosis, erythema, or other evidence of hematoma    Assessment/Plan:  86y Male now POD#0 s/p RLE diagnostic angiogram via L groin access    - Ok to restart heparin gtt  - No vascular contraindication to transition back to coumadin as early as tonight or whenver cleared by primary team  - continu prophylactic mucomyst, hydration  - Pain control  - Regular diet  - DVT ppx: Lovenox  - Out of bed and encourage early ambulation  - Incentive spirometry    Dispo: [facility]    Vascular Surgery  [id number]
Discussed with Dr. Pulliam and Heparin gtt discontinued. Lovenox 90mg BID ordered and Coumadin dosed at 7.5mg PO tonight. Will monitor INR in the AM. Discussed with Floor Pharmacist.     JONATHAN Pondc  [id number]
[place]    Notified by RN patient O2 sat 89% on RA. No c/o cp or sob. Other VSS.      Interventions taken     O2 2L via NC and O2 sat back to 95%  f/u CXR  cont assess and monitor  f/u with am team.                  Gage Ruth ANP-BC  Spectralink #[id number]
LIB[place]    Notified by RN patient with critical lab result: blood Cx positive for G+ cocci in clusters on 9/2/21 specimen. VSS. no fever.      Interventions taken     Repeat Blood cultures in Am.  cont assess and monitor  f/u with am team.                  Gage Ruth [facility] #[id number]
Patient seen and examined. He is only complaining of pain in the right foot and toes. He denies any fevers or chills. He denies numbness in his right foot or toes.    Physical exam:  General: No acute distress. AAOx3.  Neck: Supple  Respirations: Non labored  Cardiac: Regular rate and rhythm  Vascular: 2+ femoral pulses bilaterally  Extremities: Right 2-4 toes appear ischemic, foot is warm, brisk capillary refill.    A/P: Eliazar RODRIGEZ 5  - Appreciate input from podiatry and infectious disease  - Appreciate medical and cardiac optimization and risk stratification for OR  - Needs RLE angiogram and possible revascularization next week  - Will follow
Vascular Surgery Preop Note    Patient is a 86y old  Male who presents with a chief complaint of R toe pain (06 Sep 2021 15:44)    Diagnosis: R foot non healing wound  Procedure: RLE Angiogram  Surgeon: Cheyenne                          [id number]    7.12  )-----------( 168      ( 06 Sep 2021 07:10 )             31.6     09-06    144  |  103  |  66<H>  ----------------------------<  171<H>  3.7   |  28  |  1.98<H>    Ca    9.6      06 Sep 2021 07:09      PTT - ( 07 Sep 2021 02:21 )  PTT:105.4 sec         MEDICATIONS  (STANDING):  aspirin  chewable 81 milliGRAM(s) Oral daily  atorvastatin 40 milliGRAM(s) Oral at bedtime  buDESOnide    Inhalation Suspension 0.5 milliGRAM(s) Inhalation two times a day  dextrose 40% Gel 15 Gram(s) Oral once  dextrose 5%. 1000 milliLiter(s) (50 mL/Hr) IV Continuous <Continuous>  dextrose 5%. 1000 milliLiter(s) (100 mL/Hr) IV Continuous <Continuous>  dextrose 50% Injectable 25 Gram(s) IV Push once  dextrose 50% Injectable 12.5 Gram(s) IV Push once  dextrose 50% Injectable 25 Gram(s) IV Push once  finasteride 5 milliGRAM(s) Oral daily  furosemide    Tablet 20 milliGRAM(s) Oral two times a day  glucagon  Injectable 1 milliGRAM(s) IntraMuscular once  heparin  Infusion. 700 Unit(s)/Hr (7 mL/Hr) IV Continuous <Continuous>  insulin glargine Injectable (LANTUS) 25 Unit(s) SubCutaneous at bedtime  insulin lispro (ADMELOG) corrective regimen sliding scale   SubCutaneous three times a day before meals  insulin lispro (ADMELOG) corrective regimen sliding scale   SubCutaneous at bedtime  levothyroxine 25 MICROGram(s) Oral daily  losartan 100 milliGRAM(s) Oral daily  metolazone 2.5 milliGRAM(s) Oral <User Schedule>  metoprolol succinate ER 25 milliGRAM(s) Oral daily  montelukast 10 milliGRAM(s) Oral daily  mupirocin 2% Ointment 1 Application(s) Topical two times a day  pantoprazole    Tablet 40 milliGRAM(s) Oral before breakfast  tamsulosin 0.8 milliGRAM(s) Oral at bedtime    MEDICATIONS  (PRN):  acetaminophen   Tablet .. 650 milliGRAM(s) Oral every 6 hours PRN Temp greater or equal to 38C (100.4F), Mild Pain (1 - 3)  aluminum hydroxide/magnesium hydroxide/simethicone Suspension 30 milliLiter(s) Oral every 4 hours PRN Dyspepsia  heparin   Injectable 7000 Unit(s) IV Push every 6 hours PRN For aPTT less than 40  heparin   Injectable 3500 Unit(s) IV Push every 6 hours PRN For aPTT between 40 - 57  melatonin 3 milliGRAM(s) Oral at bedtime PRN Insomnia  ondansetron Injectable 4 milliGRAM(s) IV Push every 8 hours PRN Nausea and/or Vomiting      Assessment & Plan:  86y Male with HTN, HLD, Afib on coumadin, CHF, T2DM on insulin, COPD, CVA, non healing R foot ulcer. Patient is planned for RLE angiogram tomorrow.     [x] NPO after midnight  [x] IVF  [x] CXR -- done 09/05/21  [x] EKG -- done 09/03/21  [x] T&S -- 1 ordered now, 1 ordered AM  [x] AM CBC -- ordered  [x] AM BMP -- ordered  [x] AM PT, PTT, INR -- ordered  [X] COVID negative 09/02, repeat pending  [ ] Documentation of medical optimization -- IM note 09/07/21  [ ] Consent signed and in chart    Vascular Surgery  [phone]

## 2021-09-13 NOTE — PROGRESS NOTE ADULT - ASSESSMENT
86M with PMH of HTN, HLD, Afib on coumadin, CHF, T2DM on insulin, COPD, CVA, BPH p/w R toe pain and ulcer after trauma.  Xray negative for OM with CKD stage 3 ( b/l cr around 1.7mg/dl) per records awaiting lower ext angio    1) CKD stage 3  Likely Hypertensive /Diabetic nephropathy  b/l cr appears to be ranging around 1.7mg/dl  Cr stable s/p Angio on 9/8/21  ua and urine lytes reviewed    Plan  pt was taken off lasix/metolazone/losartan/LR to optimize for Angio -> can resume on DC  monitor basic metabolic panel daily   hypernatremia resolved    2) Hypertension  bp stable  monitor blood pressure      For any question, call:  Cell # [phone]  Pager # [phone]  Callback # [phone]

## 2021-09-13 NOTE — PROCEDURE NOTE - ADDITIONAL PROCEDURE DETAILS
1. Battery longevity 11.2 years  2. Lead impedances WNL  3. Sensing and pacing thresholds WNL  4. Apaced 9.9%, Vpaced 98.3%  5. One nonsustained episode stored on 7/15/21 lasting 5 secs, marker channel most likely NSVT at 162 bpm  6. Normal pacemaker function  7. Follow up with Dr Jayden Snyder in 6 weeks    Missy Velázquez, ANP-C  #[id number]

## 2021-09-13 NOTE — CHART NOTE - NSCHARTNOTESELECT_GEN_ALL_CORE
Event Note
Pre-Op
Vascular Surgery/Event Note
Heparin Dose adjustment/Event Note
Post-Op Check
Vascular Surgery/Event Note

## 2021-09-13 NOTE — PROGRESS NOTE ADULT - SUBJECTIVE AND OBJECTIVE BOX
[facility] - ORESTES Wall / ORESTES Wynne / KARIE Melendez/ ORESTES Knight/ ORESTES Jang/ ADRIA Lee / TOÑITO Mora / ROBB Kapoor  ---------------------------------------------------------------------------------------------------------------  seen and examined today for CKD  Interval : NAD, serum creatinine stable  VITALS:  T(F): 97.6 (09-13-21 @ 04:40), Max: 98.2 (09-12-21 @ 21:40)  HR: 66 (09-13-21 @ 11:20)  BP: 139/74 (09-13-21 @ 04:40)  RR: 20 (09-13-21 @ 11:20)  SpO2: 95% (09-13-21 @ 11:20)  Wt(kg): --    09-12 @ 07:01  -  09-13 @ 07:00  --------------------------------------------------------  IN: 0 mL / OUT: 400 mL / NET: -400 mL      Physical Exam :-  Constitutional: NAD  Neck: Supple.  Respiratory: Bilateral equal breath sounds,  Cardiovascular: S1, S2 normal,  Gastrointestinal: Bowel Sounds present, soft, non tender.  Extremities: 1+ edema  Neurological: Alert and Oriented x 3, no focal deficits  Psychiatric: Normal mood, normal affect  Data:-  Allergies :   No Known Allergies    Hospital Medications:   MEDICATIONS  (STANDING):  atorvastatin 40 milliGRAM(s) Oral at bedtime  buDESOnide    Inhalation Suspension 0.5 milliGRAM(s) Inhalation two times a day  dextrose 50% Injectable 25 Gram(s) IV Push once  dextrose 50% Injectable 12.5 Gram(s) IV Push once  enoxaparin Injectable 90 milliGRAM(s) SubCutaneous two times a day  finasteride 5 milliGRAM(s) Oral daily  insulin glargine Injectable (LANTUS) 22 Unit(s) SubCutaneous at bedtime  insulin lispro (ADMELOG) corrective regimen sliding scale   SubCutaneous at bedtime  insulin lispro (ADMELOG) corrective regimen sliding scale   SubCutaneous three times a day before meals  insulin lispro Injectable (ADMELOG) 6 Unit(s) SubCutaneous three times a day before meals  levothyroxine 25 MICROGram(s) Oral daily  metoprolol succinate ER 25 milliGRAM(s) Oral daily  montelukast 10 milliGRAM(s) Oral daily  mupirocin 2% Ointment 1 Application(s) Topical two times a day  pantoprazole    Tablet 40 milliGRAM(s) Oral before breakfast  sodium chloride 0.9%. 1000 milliLiter(s) (75 mL/Hr) IV Continuous <Continuous>  tamsulosin 0.8 milliGRAM(s) Oral at bedtime  warfarin 7.5 milliGRAM(s) Oral once    09-13    142  |  106  |  42<H>  ----------------------------<  148<H>  4.2   |  25  |  1.46<H>    Ca    9.4      13 Sep 2021 04:11      Creatinine Trend: 1.46 <--, 1.28 <--, 1.65 <--, 1.45 <--, 1.60 <--, 1.75 <--                        8.8    8.85  )-----------( 170      ( 13 Sep 2021 04:11 )             28.9

## 2021-09-13 NOTE — PROGRESS NOTE ADULT - SUBJECTIVE AND OBJECTIVE BOX
Patient is a 86y old  Male who presents with a chief complaint of R toe pain (12 Sep 2021 11:59)      SUBJECTIVE / OVERNIGHT EVENTS:    Events noted.  CONSTITUTIONAL: No fever,  or fatigue  RESPIRATORY: No cough, wheezing,  No shortness of breath  CARDIOVASCULAR: No chest pain, palpitations, dizziness, or leg swelling  GASTROINTESTINAL: No abdominal or epigastric pain. No nausea, vomiting.  NEUROLOGICAL: No headaches,     MEDICATIONS  (STANDING):  atorvastatin 40 milliGRAM(s) Oral at bedtime  buDESOnide    Inhalation Suspension 0.5 milliGRAM(s) Inhalation two times a day  dextrose 50% Injectable 25 Gram(s) IV Push once  dextrose 50% Injectable 12.5 Gram(s) IV Push once  finasteride 5 milliGRAM(s) Oral daily  heparin  Infusion. 900 Unit(s)/Hr (9 mL/Hr) IV Continuous <Continuous>  insulin glargine Injectable (LANTUS) 25 Unit(s) SubCutaneous at bedtime  insulin lispro (ADMELOG) corrective regimen sliding scale   SubCutaneous three times a day before meals  insulin lispro (ADMELOG) corrective regimen sliding scale   SubCutaneous at bedtime  insulin lispro Injectable (ADMELOG) 6 Unit(s) SubCutaneous three times a day before meals  levothyroxine 25 MICROGram(s) Oral daily  metoprolol succinate ER 25 milliGRAM(s) Oral daily  montelukast 10 milliGRAM(s) Oral daily  mupirocin 2% Ointment 1 Application(s) Topical two times a day  pantoprazole    Tablet 40 milliGRAM(s) Oral before breakfast  sodium chloride 0.9%. 1000 milliLiter(s) (75 mL/Hr) IV Continuous <Continuous>  tamsulosin 0.8 milliGRAM(s) Oral at bedtime    MEDICATIONS  (PRN):  acetaminophen   Tablet .. 650 milliGRAM(s) Oral every 6 hours PRN Mild Pain (1 - 3)  aluminum hydroxide/magnesium hydroxide/simethicone Suspension 30 milliLiter(s) Oral every 4 hours PRN Dyspepsia  heparin   Injectable 3500 Unit(s) IV Push every 6 hours PRN For aPTT between 40 - 57  melatonin 3 milliGRAM(s) Oral at bedtime PRN Insomnia  ondansetron Injectable 4 milliGRAM(s) IV Push every 8 hours PRN Nausea and/or Vomiting        CAPILLARY BLOOD GLUCOSE      POCT Blood Glucose.: 265 mg/dL (12 Sep 2021 21:15)  POCT Blood Glucose.: 129 mg/dL (12 Sep 2021 17:27)  POCT Blood Glucose.: 140 mg/dL (12 Sep 2021 12:39)  POCT Blood Glucose.: 72 mg/dL (12 Sep 2021 08:38)    I&O's Summary    11 Sep 2021 07:01  -  12 Sep 2021 07:00  --------------------------------------------------------  IN: 792 mL / OUT: 650 mL / NET: 142 mL        T(C): 36.8 (09-12-21 @ 21:40), Max: 36.8 (09-12-21 @ 21:40)  HR: 81 (09-12-21 @ 21:40) (81 - 87)  BP: 141/65 (09-12-21 @ 21:40) (141/65 - 158/77)  RR: 18 (09-12-21 @ 21:40) (18 - 18)  SpO2: 96% (09-12-21 @ 21:40) (96% - 98%)    PHYSICAL EXAM:  GENERAL: NAD  NECK: Supple, No JVD  CHEST/LUNG: Clear to auscultation bilaterally; No wheezing.  HEART: Regular rate and rhythm; No murmurs, rubs, or gallops  ABDOMEN: Soft, Nontender, Nondistended; Bowel sounds present  EXTREMITIES:   No edema  NEUROLOGY: AAO       LABS:                        9.3    7.05  )-----------( 163      ( 12 Sep 2021 07:18 )             31.1     09-11    146<H>  |  108  |  44<H>  ----------------------------<  73  4.1   |  26  |  1.28    Ca    9.3      11 Sep 2021 06:45      PT/INR - ( 12 Sep 2021 07:20 )   PT: 14.5 sec;   INR: 1.22 ratio         PTT - ( 12 Sep 2021 21:00 )  PTT:39.7 sec        CAPILLARY BLOOD GLUCOSE      POCT Blood Glucose.: 265 mg/dL (12 Sep 2021 21:15)  POCT Blood Glucose.: 129 mg/dL (12 Sep 2021 17:27)  POCT Blood Glucose.: 140 mg/dL (12 Sep 2021 12:39)  POCT Blood Glucose.: 72 mg/dL (12 Sep 2021 08:38)        RADIOLOGY & ADDITIONAL TESTS:    Imaging Personally Reviewed:    Consultant(s) Notes Reviewed:      Care Discussed with Consultants/Other Providers:    Graham Pulliam MD, CMD, FACP    [address] [place] [address]  Office Tel: [phone]  Cell: [phone]

## 2021-09-14 ENCOUNTER — APPOINTMENT (OUTPATIENT)
Dept: ELECTROPHYSIOLOGY | Facility: CLINIC | Age: 86
End: 2021-09-14

## 2021-09-14 LAB
ANION GAP SERPL CALC-SCNC: 13 MMOL/L — SIGNIFICANT CHANGE UP (ref 5–17)
BUN SERPL-MCNC: 41 MG/DL — HIGH (ref 7–23)
CALCIUM SERPL-MCNC: 10 MG/DL — SIGNIFICANT CHANGE UP (ref 8.4–10.5)
CHLORIDE SERPL-SCNC: 106 MMOL/L — SIGNIFICANT CHANGE UP (ref 96–108)
CO2 SERPL-SCNC: 25 MMOL/L — SIGNIFICANT CHANGE UP (ref 22–31)
CREAT SERPL-MCNC: 1.35 MG/DL — HIGH (ref 0.5–1.3)
GLUCOSE BLDC GLUCOMTR-MCNC: 104 MG/DL — HIGH (ref 70–99)
GLUCOSE BLDC GLUCOMTR-MCNC: 125 MG/DL — HIGH (ref 70–99)
GLUCOSE BLDC GLUCOMTR-MCNC: 155 MG/DL — HIGH (ref 70–99)
GLUCOSE BLDC GLUCOMTR-MCNC: 184 MG/DL — HIGH (ref 70–99)
GLUCOSE SERPL-MCNC: 113 MG/DL — HIGH (ref 70–99)
HCT VFR BLD CALC: 31.4 % — LOW (ref 39–50)
HGB BLD-MCNC: 9.5 G/DL — LOW (ref 13–17)
INR BLD: 1.64 RATIO — HIGH (ref 0.88–1.16)
MCHC RBC-ENTMCNC: 27.9 PG — SIGNIFICANT CHANGE UP (ref 27–34)
MCHC RBC-ENTMCNC: 30.3 GM/DL — LOW (ref 32–36)
MCV RBC AUTO: 92.4 FL — SIGNIFICANT CHANGE UP (ref 80–100)
NRBC # BLD: 0 /100 WBCS — SIGNIFICANT CHANGE UP (ref 0–0)
PLATELET # BLD AUTO: 188 K/UL — SIGNIFICANT CHANGE UP (ref 150–400)
POTASSIUM SERPL-MCNC: 4.3 MMOL/L — SIGNIFICANT CHANGE UP (ref 3.5–5.3)
POTASSIUM SERPL-SCNC: 4.3 MMOL/L — SIGNIFICANT CHANGE UP (ref 3.5–5.3)
PROTHROM AB SERPL-ACNC: 19.2 SEC — HIGH (ref 10.6–13.6)
RBC # BLD: 3.4 M/UL — LOW (ref 4.2–5.8)
RBC # FLD: 16 % — HIGH (ref 10.3–14.5)
SODIUM SERPL-SCNC: 144 MMOL/L — SIGNIFICANT CHANGE UP (ref 135–145)
WBC # BLD: 7.57 K/UL — SIGNIFICANT CHANGE UP (ref 3.8–10.5)
WBC # FLD AUTO: 7.57 K/UL — SIGNIFICANT CHANGE UP (ref 3.8–10.5)

## 2021-09-14 RX ORDER — WARFARIN SODIUM 2.5 MG/1
7.5 TABLET ORAL ONCE
Refills: 0 | Status: COMPLETED | OUTPATIENT
Start: 2021-09-14 | End: 2021-09-14

## 2021-09-14 RX ADMIN — Medication 25 MICROGRAM(S): at 05:30

## 2021-09-14 RX ADMIN — Medication 650 MILLIGRAM(S): at 08:39

## 2021-09-14 RX ADMIN — Medication 0.5 MILLIGRAM(S): at 05:31

## 2021-09-14 RX ADMIN — Medication 650 MILLIGRAM(S): at 21:46

## 2021-09-14 RX ADMIN — Medication 650 MILLIGRAM(S): at 16:00

## 2021-09-14 RX ADMIN — MUPIROCIN 1 APPLICATION(S): 20 OINTMENT TOPICAL at 05:36

## 2021-09-14 RX ADMIN — FINASTERIDE 5 MILLIGRAM(S): 5 TABLET, FILM COATED ORAL at 11:36

## 2021-09-14 RX ADMIN — PANTOPRAZOLE SODIUM 40 MILLIGRAM(S): 20 TABLET, DELAYED RELEASE ORAL at 05:30

## 2021-09-14 RX ADMIN — Medication 0.5 MILLIGRAM(S): at 17:55

## 2021-09-14 RX ADMIN — Medication 1: at 12:57

## 2021-09-14 RX ADMIN — ENOXAPARIN SODIUM 90 MILLIGRAM(S): 100 INJECTION SUBCUTANEOUS at 11:35

## 2021-09-14 RX ADMIN — Medication 650 MILLIGRAM(S): at 15:03

## 2021-09-14 RX ADMIN — ATORVASTATIN CALCIUM 40 MILLIGRAM(S): 80 TABLET, FILM COATED ORAL at 21:47

## 2021-09-14 RX ADMIN — Medication 25 MILLIGRAM(S): at 05:30

## 2021-09-14 RX ADMIN — Medication 6 UNIT(S): at 08:38

## 2021-09-14 RX ADMIN — ENOXAPARIN SODIUM 90 MILLIGRAM(S): 100 INJECTION SUBCUTANEOUS at 23:18

## 2021-09-14 RX ADMIN — INSULIN GLARGINE 22 UNIT(S): 100 INJECTION, SOLUTION SUBCUTANEOUS at 21:47

## 2021-09-14 RX ADMIN — Medication 650 MILLIGRAM(S): at 22:15

## 2021-09-14 RX ADMIN — MONTELUKAST 10 MILLIGRAM(S): 4 TABLET, CHEWABLE ORAL at 11:35

## 2021-09-14 RX ADMIN — Medication 6 UNIT(S): at 17:53

## 2021-09-14 RX ADMIN — Medication 650 MILLIGRAM(S): at 09:40

## 2021-09-14 RX ADMIN — Medication 6 UNIT(S): at 12:57

## 2021-09-14 RX ADMIN — WARFARIN SODIUM 7.5 MILLIGRAM(S): 2.5 TABLET ORAL at 21:46

## 2021-09-14 RX ADMIN — TAMSULOSIN HYDROCHLORIDE 0.8 MILLIGRAM(S): 0.4 CAPSULE ORAL at 21:46

## 2021-09-14 RX ADMIN — MUPIROCIN 1 APPLICATION(S): 20 OINTMENT TOPICAL at 17:55

## 2021-09-14 NOTE — PROGRESS NOTE ADULT - PROBLEM SELECTOR PLAN 6
BP stable, at goal  c/w home medications  routine monitoring

## 2021-09-14 NOTE — PROGRESS NOTE ADULT - SUBJECTIVE AND OBJECTIVE BOX
Patient is a 86y old  Male who presents with a chief complaint of R toe pain (14 Sep 2021 13:59)      SUBJECTIVE / OVERNIGHT EVENTS:    Events noted.  CONSTITUTIONAL: No fever,  or fatigue  RESPIRATORY: No cough, wheezing,  No shortness of breath  CARDIOVASCULAR: No chest pain, palpitations  GASTROINTESTINAL: No abdominal or epigastric pain.   NEUROLOGICAL: No headaches,     MEDICATIONS  (STANDING):  atorvastatin 40 milliGRAM(s) Oral at bedtime  buDESOnide    Inhalation Suspension 0.5 milliGRAM(s) Inhalation two times a day  dextrose 50% Injectable 25 Gram(s) IV Push once  dextrose 50% Injectable 12.5 Gram(s) IV Push once  enoxaparin Injectable 90 milliGRAM(s) SubCutaneous two times a day  finasteride 5 milliGRAM(s) Oral daily  insulin glargine Injectable (LANTUS) 22 Unit(s) SubCutaneous at bedtime  insulin lispro (ADMELOG) corrective regimen sliding scale   SubCutaneous three times a day before meals  insulin lispro (ADMELOG) corrective regimen sliding scale   SubCutaneous at bedtime  insulin lispro Injectable (ADMELOG) 6 Unit(s) SubCutaneous three times a day before meals  levothyroxine 25 MICROGram(s) Oral daily  metoprolol succinate ER 25 milliGRAM(s) Oral daily  montelukast 10 milliGRAM(s) Oral daily  mupirocin 2% Ointment 1 Application(s) Topical two times a day  pantoprazole    Tablet 40 milliGRAM(s) Oral before breakfast  sodium chloride 0.9%. 1000 milliLiter(s) (75 mL/Hr) IV Continuous <Continuous>  tamsulosin 0.8 milliGRAM(s) Oral at bedtime    MEDICATIONS  (PRN):  acetaminophen   Tablet .. 650 milliGRAM(s) Oral every 6 hours PRN Mild Pain (1 - 3)  aluminum hydroxide/magnesium hydroxide/simethicone Suspension 30 milliLiter(s) Oral every 4 hours PRN Dyspepsia  melatonin 3 milliGRAM(s) Oral at bedtime PRN Insomnia  ondansetron Injectable 4 milliGRAM(s) IV Push every 8 hours PRN Nausea and/or Vomiting        CAPILLARY BLOOD GLUCOSE      POCT Blood Glucose.: 184 mg/dL (14 Sep 2021 21:30)  POCT Blood Glucose.: 125 mg/dL (14 Sep 2021 17:47)  POCT Blood Glucose.: 155 mg/dL (14 Sep 2021 12:18)  POCT Blood Glucose.: 104 mg/dL (14 Sep 2021 08:21)    I&O's Summary    13 Sep 2021 07:01  -  14 Sep 2021 07:00  --------------------------------------------------------  IN: 480 mL / OUT: 250 mL / NET: 230 mL    14 Sep 2021 07:01  -  15 Sep 2021 00:53  --------------------------------------------------------  IN: 0 mL / OUT: 250 mL / NET: -250 mL        T(C): 36.7 (09-14-21 @ 21:44), Max: 36.7 (09-14-21 @ 21:44)  HR: 84 (09-14-21 @ 21:44) (67 - 84)  BP: 169/68 (09-14-21 @ 21:44) (137/75 - 169/68)  RR: 18 (09-14-21 @ 21:44) (18 - 20)  SpO2: 98% (09-14-21 @ 21:44) (94% - 99%)    PHYSICAL EXAM:    NECK: Supple, No JVD  CHEST/LUNG: Clear to auscultation bilaterally; No wheezing.  HEART: Regular rate and rhythm; No murmurs, rubs, or gallops  ABDOMEN: Soft, Nontender, Nondistended; Bowel sounds present  EXTREMITIES:   No edema  NEUROLOGY: AAO       LABS:                        9.5    7.57  )-----------( 188      ( 14 Sep 2021 07:07 )             31.4     09-14    144  |  106  |  41<H>  ----------------------------<  113<H>  4.3   |  25  |  1.35<H>    Ca    10.0      14 Sep 2021 07:06      PT/INR - ( 14 Sep 2021 07:07 )   PT: 19.2 sec;   INR: 1.64 ratio         PTT - ( 13 Sep 2021 11:29 )  PTT:83.0 sec        CAPILLARY BLOOD GLUCOSE      POCT Blood Glucose.: 184 mg/dL (14 Sep 2021 21:30)  POCT Blood Glucose.: 125 mg/dL (14 Sep 2021 17:47)  POCT Blood Glucose.: 155 mg/dL (14 Sep 2021 12:18)  POCT Blood Glucose.: 104 mg/dL (14 Sep 2021 08:21)        RADIOLOGY & ADDITIONAL TESTS:    Imaging Personally Reviewed:    Consultant(s) Notes Reviewed:      Care Discussed with Consultants/Other Providers:    Graham Pulliam MD, CMD, FACP    [address] [place] [address]  Office Tel: [phone]  Cell: [phone]

## 2021-09-14 NOTE — PROGRESS NOTE ADULT - ASSESSMENT
86M with PMH of HTN, HLD, Afib on coumadin, CHF, T2DM on insulin, COPD, CVA, BPH p/w R toe pain and ulcer after trauma.  Xray negative for OM with CKD stage 3 ( b/l cr around 1.7mg/dl) per records awaiting lower ext angio    1) CKD stage 3  Likely Hypertensive /Diabetic nephropathy  b/l cr appears to be ranging around 1.7mg/dl  Cr stable s/p Angio on 9/8/21  ua and urine lytes reviewed    Plan  pt was taken off lasix/metolazone/losartan/LR to optimize for Angio -> can resume on DC  monitor basic metabolic panel daily   hypernatremia resolved    2) Hypertension  bp stable  monitor blood pressure      For any question, call:  Cell # [phone]  Pager # [phone]  Callback # [phone]

## 2021-09-14 NOTE — PROGRESS NOTE ADULT - PROBLEM SELECTOR PROBLEM 5
Type 2 diabetes mellitus treated with insulin

## 2021-09-14 NOTE — PROGRESS NOTE ADULT - PROBLEM SELECTOR PROBLEM 2
Toe ulcer, right

## 2021-09-14 NOTE — PROGRESS NOTE ADULT - PROBLEM SELECTOR PLAN 7
COPD, no wheezing on exam, denies SOB  c/w pulmicort and singulair

## 2021-09-14 NOTE — PROGRESS NOTE ADULT - PROBLEM SELECTOR PLAN 2
pt with R 4th toe ulcer, appears clean, no purulence, no e/o overlying cellulitis -   podiatry/ID eval appreciated - no e/o acute infection, no podiatric interventions planned   c/w wound care  Monitor off abx

## 2021-09-14 NOTE — PROGRESS NOTE ADULT - PROBLEM SELECTOR PLAN 3
h/o CHF with moderately reduced LV systolic dysfunction and stage 1 diastolic dysfunction   currently appears euvolemic  c/w lasix and metolazone   c/w GDMT with metoprolol and losartan

## 2021-09-14 NOTE — PROGRESS NOTE ADULT - PROBLEM SELECTOR PROBLEM 1
Peripheral artery disease

## 2021-09-14 NOTE — PROGRESS NOTE ADULT - PROBLEM SELECTOR PROBLEM 7
Chronic obstructive pulmonary disease, unspecified COPD type

## 2021-09-14 NOTE — PROGRESS NOTE ADULT - SUBJECTIVE AND OBJECTIVE BOX
[facility] - ORESTES Wall / ORESTES Wynne / KARIE Melendez/ ORESTES Knight/ ORESTES Jang/ ADRIA Lee / TOÑITO Mora / ROBB Kapoor  ---------------------------------------------------------------------------------------------------------------  seen and examined today for CKD 3  Interval : serum creatinine stable  VITALS:  T(F): 97.5 (09-14-21 @ 05:40), Max: 97.5 (09-13-21 @ 21:05)  HR: 67 (09-14-21 @ 05:40)  BP: 163/71 (09-14-21 @ 05:40)  RR: 18 (09-14-21 @ 09:00)  SpO2: 96% (09-14-21 @ 09:00)  Wt(kg): --    09-13 @ 07:01  -  09-14 @ 07:00  --------------------------------------------------------  IN: 480 mL / OUT: 250 mL / NET: 230 mL    09-14 @ 07:01  -  09-14 @ 13:59  --------------------------------------------------------  IN: 0 mL / OUT: 250 mL / NET: -250 mL      Physical Exam :-  Constitutional: NAD  Neck: Supple.  Respiratory: Bilateral equal breath sounds,  Cardiovascular: S1, S2 normal,  Gastrointestinal: Bowel Sounds present, soft, non tender.  Extremities: trace edema  Neurological: Alert and Oriented x 3, no focal deficits  Psychiatric: Normal mood, normal affect  Data:-  Allergies :   No Known Allergies    Hospital Medications:   MEDICATIONS  (STANDING):  atorvastatin 40 milliGRAM(s) Oral at bedtime  buDESOnide    Inhalation Suspension 0.5 milliGRAM(s) Inhalation two times a day  dextrose 50% Injectable 25 Gram(s) IV Push once  dextrose 50% Injectable 12.5 Gram(s) IV Push once  enoxaparin Injectable 90 milliGRAM(s) SubCutaneous two times a day  finasteride 5 milliGRAM(s) Oral daily  insulin glargine Injectable (LANTUS) 22 Unit(s) SubCutaneous at bedtime  insulin lispro (ADMELOG) corrective regimen sliding scale   SubCutaneous three times a day before meals  insulin lispro (ADMELOG) corrective regimen sliding scale   SubCutaneous at bedtime  insulin lispro Injectable (ADMELOG) 6 Unit(s) SubCutaneous three times a day before meals  levothyroxine 25 MICROGram(s) Oral daily  metoprolol succinate ER 25 milliGRAM(s) Oral daily  montelukast 10 milliGRAM(s) Oral daily  mupirocin 2% Ointment 1 Application(s) Topical two times a day  pantoprazole    Tablet 40 milliGRAM(s) Oral before breakfast  sodium chloride 0.9%. 1000 milliLiter(s) (75 mL/Hr) IV Continuous <Continuous>  tamsulosin 0.8 milliGRAM(s) Oral at bedtime  warfarin 7.5 milliGRAM(s) Oral once    09-14    144  |  106  |  41<H>  ----------------------------<  113<H>  4.3   |  25  |  1.35<H>    Ca    10.0      14 Sep 2021 07:06      Creatinine Trend: 1.35 <--, 1.46 <--, 1.28 <--, 1.65 <--, 1.45 <--, 1.60 <--                        9.5    7.57  )-----------( 188      ( 14 Sep 2021 07:07 )             31.4

## 2021-09-14 NOTE — PROGRESS NOTE ADULT - PROBLEM SELECTOR PROBLEM 8
BPH (benign prostatic hyperplasia)

## 2021-09-14 NOTE — PROGRESS NOTE ADULT - PROBLEM SELECTOR PROBLEM 3
Chronic combined systolic and diastolic heart failure

## 2021-09-14 NOTE — PROGRESS NOTE ADULT - PROBLEM SELECTOR PLAN 4
chronic afib, currently rate controlled   c/w metoprolol  IV Heparin
chronic afib, currently rate controlled   c/w metoprolol  IV Heparin/Coumadin    Dc planning
chronic afib, currently rate controlled   c/w mteoprolol  c/w coumadin 5mg daily, INR 2.9 - trend daily  f/u with vascular if needs to be held prior to LE angiogram
chronic afib, currently rate controlled   c/w metoprolol  SQ Lovenox/Coumadin    Dc planning once INR therapeutic.
chronic afib, currently rate controlled   c/w metoprolol  IV Heparin
chronic afib, currently rate controlled   c/w metoprolol  IV Heparin/Coumadin    Dc planning once INR therapeutic.
chronic afib, currently rate controlled   c/w metoprolol  IV Heparin/Coumadin    Dc planning
chronic afib, currently rate controlled   c/w metoprolol  IV Heparin
chronic afib, currently rate controlled   c/w metoprolol  IV Heparin
chronic afib, currently rate controlled   c/w mteoprolol  c/w coumadin 5mg daily, INR 2.9 - trend daily  f/u with vascular if needs to be held prior to LE angiogram

## 2021-09-14 NOTE — PROGRESS NOTE ADULT - PROBLEM SELECTOR PLAN 8
c/w flomax and finasteride

## 2021-09-14 NOTE — PROGRESS NOTE ADULT - PROBLEM SELECTOR PLAN 1
pt with worsening R great toe pain, dependent rubor on toes of b/l feet and non healing ulcer c/f PAD   - vascular surgery follow up appreciated.  - S/p LE angio  - c/w ASA, statin
pt with worsening R great toe pain, dependent rubor on toes of b/l feet and non healing ulcer c/f PAD   - vascular surgery follow up appreciated.  - S/p LE angio  - c/w ASA, statin
pt with worsening R great toe pain, dependent rubor on toes of b/l feet and non healing ulcer c/f PAD   - vascular surgery following recs appreciated / LE dopplers  - tentative plan for LE angiogram next week  - c/w ASA, statin
pt with worsening R great toe pain, dependent rubor on toes of b/l feet and non healing ulcer c/f PAD   - vascular surgery follow up appreciated.  - S/p LE angio  - c/w ASA, statin
pt with worsening R great toe pain, dependent rubor on toes of b/l feet and non healing ulcer c/f PAD   - vascular surgery follow up appreciated.  - S/p LE angio  - c/w ASA, statin
pt with worsening R great toe pain, dependent rubor on toes of b/l feet and non healing ulcer c/f PAD   - vascular surgery following recs appreciated / LE dopplers  - tentative plan for LE angiogram wednesday  - c/w ASA, statin
pt with worsening R great toe pain, dependent rubor on toes of b/l feet and non healing ulcer c/f PAD   - vascular surgery follow up appreciated.  - S/p LE angio  - c/w ASA, statin
pt with worsening R great toe pain, dependent rubor on toes of b/l feet and non healing ulcer c/f PAD   - vascular surgery follow up appreciated.  - S/p LE angio  - c/w ASA, statin
pt with worsening R great toe pain, dependent rubor on toes of b/l feet and non healing ulcer c/f PAD   - vascular surgery following recs appreciated   - check ALMAZ/PVRs  - tentative plan for LE angiogram next week  - c/w ASA, statin
pt with worsening R great toe pain, dependent rubor on toes of b/l feet and non healing ulcer c/f PAD   - vascular surgery following recs appreciated / LE dopplers  - tentative plan for LE angiogram wednesday  - c/w ASA, statin
pt with worsening R great toe pain, dependent rubor on toes of b/l feet and non healing ulcer c/f PAD   - vascular surgery following recs appreciated   - tentative plan for LE angiogram next week  - c/w ASA, statin
pt with worsening R great toe pain, dependent rubor on toes of b/l feet and non healing ulcer c/f PAD   - vascular surgery follow up appreciated.  - S/p LE angio  - c/w ASA, statin

## 2021-09-14 NOTE — PROGRESS NOTE ADULT - PROBLEM SELECTOR PLAN 5
[facility]  Lantus
[facility]  Lantus
hold oral home medications  on lantus 30units at home - dose reduce to 25units, titrate as needed  low sliding scale and monitor FS ac and hs
[facility]  Lantus
[facility]  Lantus
hold oral home medications  on lantus 30units at home - dose reduce to 25units, titrate as needed  low sliding scale and monitor FS ac and hs
[facility]  Lantus

## 2021-09-14 NOTE — PROGRESS NOTE ADULT - PROBLEM SELECTOR PROBLEM 4
Chronic atrial fibrillation

## 2021-09-15 VITALS
DIASTOLIC BLOOD PRESSURE: 73 MMHG | SYSTOLIC BLOOD PRESSURE: 155 MMHG | OXYGEN SATURATION: 97 % | TEMPERATURE: 98 F | RESPIRATION RATE: 18 BRPM | HEART RATE: 74 BPM

## 2021-09-15 LAB
ANION GAP SERPL CALC-SCNC: 14 MMOL/L — SIGNIFICANT CHANGE UP (ref 5–17)
BUN SERPL-MCNC: 35 MG/DL — HIGH (ref 7–23)
CALCIUM SERPL-MCNC: 9.5 MG/DL — SIGNIFICANT CHANGE UP (ref 8.4–10.5)
CHLORIDE SERPL-SCNC: 108 MMOL/L — SIGNIFICANT CHANGE UP (ref 96–108)
CO2 SERPL-SCNC: 23 MMOL/L — SIGNIFICANT CHANGE UP (ref 22–31)
CREAT SERPL-MCNC: 1.17 MG/DL — SIGNIFICANT CHANGE UP (ref 0.5–1.3)
GLUCOSE BLDC GLUCOMTR-MCNC: 137 MG/DL — HIGH (ref 70–99)
GLUCOSE BLDC GLUCOMTR-MCNC: 183 MG/DL — HIGH (ref 70–99)
GLUCOSE SERPL-MCNC: 123 MG/DL — HIGH (ref 70–99)
HCT VFR BLD CALC: 32 % — LOW (ref 39–50)
HGB BLD-MCNC: 9.7 G/DL — LOW (ref 13–17)
INR BLD: 1.99 RATIO — HIGH (ref 0.88–1.16)
MCHC RBC-ENTMCNC: 27.9 PG — SIGNIFICANT CHANGE UP (ref 27–34)
MCHC RBC-ENTMCNC: 30.3 GM/DL — LOW (ref 32–36)
MCV RBC AUTO: 92 FL — SIGNIFICANT CHANGE UP (ref 80–100)
NRBC # BLD: 0 /100 WBCS — SIGNIFICANT CHANGE UP (ref 0–0)
PLATELET # BLD AUTO: 210 K/UL — SIGNIFICANT CHANGE UP (ref 150–400)
POTASSIUM SERPL-MCNC: 4.3 MMOL/L — SIGNIFICANT CHANGE UP (ref 3.5–5.3)
POTASSIUM SERPL-SCNC: 4.3 MMOL/L — SIGNIFICANT CHANGE UP (ref 3.5–5.3)
PROTHROM AB SERPL-ACNC: 23.1 SEC — HIGH (ref 10.6–13.6)
RBC # BLD: 3.48 M/UL — LOW (ref 4.2–5.8)
RBC # FLD: 16 % — HIGH (ref 10.3–14.5)
SARS-COV-2 RNA SPEC QL NAA+PROBE: SIGNIFICANT CHANGE UP
SODIUM SERPL-SCNC: 145 MMOL/L — SIGNIFICANT CHANGE UP (ref 135–145)
WBC # BLD: 7.51 K/UL — SIGNIFICANT CHANGE UP (ref 3.8–10.5)
WBC # FLD AUTO: 7.51 K/UL — SIGNIFICANT CHANGE UP (ref 3.8–10.5)

## 2021-09-15 PROCEDURE — U0005: CPT

## 2021-09-15 PROCEDURE — 73630 X-RAY EXAM OF FOOT: CPT

## 2021-09-15 PROCEDURE — 82570 ASSAY OF URINE CREATININE: CPT

## 2021-09-15 PROCEDURE — 86850 RBC ANTIBODY SCREEN: CPT

## 2021-09-15 PROCEDURE — 99285 EMERGENCY DEPT VISIT HI MDM: CPT | Mod: 25

## 2021-09-15 PROCEDURE — 97530 THERAPEUTIC ACTIVITIES: CPT

## 2021-09-15 PROCEDURE — 71045 X-RAY EXAM CHEST 1 VIEW: CPT

## 2021-09-15 PROCEDURE — C1760: CPT

## 2021-09-15 PROCEDURE — 86769 SARS-COV-2 COVID-19 ANTIBODY: CPT

## 2021-09-15 PROCEDURE — 82803 BLOOD GASES ANY COMBINATION: CPT

## 2021-09-15 PROCEDURE — C1769: CPT

## 2021-09-15 PROCEDURE — 85014 HEMATOCRIT: CPT

## 2021-09-15 PROCEDURE — 80048 BASIC METABOLIC PNL TOTAL CA: CPT

## 2021-09-15 PROCEDURE — 84295 ASSAY OF SERUM SODIUM: CPT

## 2021-09-15 PROCEDURE — 84300 ASSAY OF URINE SODIUM: CPT

## 2021-09-15 PROCEDURE — 86901 BLOOD TYPING SEROLOGIC RH(D): CPT

## 2021-09-15 PROCEDURE — 80053 COMPREHEN METABOLIC PANEL: CPT

## 2021-09-15 PROCEDURE — 97116 GAIT TRAINING THERAPY: CPT

## 2021-09-15 PROCEDURE — 83735 ASSAY OF MAGNESIUM: CPT

## 2021-09-15 PROCEDURE — 83605 ASSAY OF LACTIC ACID: CPT

## 2021-09-15 PROCEDURE — 85018 HEMOGLOBIN: CPT

## 2021-09-15 PROCEDURE — 93005 ELECTROCARDIOGRAM TRACING: CPT

## 2021-09-15 PROCEDURE — 82330 ASSAY OF CALCIUM: CPT

## 2021-09-15 PROCEDURE — 87150 DNA/RNA AMPLIFIED PROBE: CPT

## 2021-09-15 PROCEDURE — 87077 CULTURE AEROBIC IDENTIFY: CPT

## 2021-09-15 PROCEDURE — U0003: CPT

## 2021-09-15 PROCEDURE — 85025 COMPLETE CBC W/AUTO DIFF WBC: CPT

## 2021-09-15 PROCEDURE — 97162 PT EVAL MOD COMPLEX 30 MIN: CPT

## 2021-09-15 PROCEDURE — 84100 ASSAY OF PHOSPHORUS: CPT

## 2021-09-15 PROCEDURE — 83935 ASSAY OF URINE OSMOLALITY: CPT

## 2021-09-15 PROCEDURE — 86900 BLOOD TYPING SEROLOGIC ABO: CPT

## 2021-09-15 PROCEDURE — 82962 GLUCOSE BLOOD TEST: CPT

## 2021-09-15 PROCEDURE — 85730 THROMBOPLASTIN TIME PARTIAL: CPT

## 2021-09-15 PROCEDURE — 87040 BLOOD CULTURE FOR BACTERIA: CPT

## 2021-09-15 PROCEDURE — 81003 URINALYSIS AUTO W/O SCOPE: CPT

## 2021-09-15 PROCEDURE — C1894: CPT

## 2021-09-15 PROCEDURE — 84132 ASSAY OF SERUM POTASSIUM: CPT

## 2021-09-15 PROCEDURE — 76000 FLUOROSCOPY <1 HR PHYS/QHP: CPT

## 2021-09-15 PROCEDURE — C1887: CPT

## 2021-09-15 PROCEDURE — 86140 C-REACTIVE PROTEIN: CPT

## 2021-09-15 PROCEDURE — 85027 COMPLETE CBC AUTOMATED: CPT

## 2021-09-15 PROCEDURE — 94640 AIRWAY INHALATION TREATMENT: CPT

## 2021-09-15 PROCEDURE — 85610 PROTHROMBIN TIME: CPT

## 2021-09-15 PROCEDURE — 93923 UPR/LXTR ART STDY 3+ LVLS: CPT

## 2021-09-15 PROCEDURE — 83036 HEMOGLOBIN GLYCOSYLATED A1C: CPT

## 2021-09-15 PROCEDURE — 82435 ASSAY OF BLOOD CHLORIDE: CPT

## 2021-09-15 PROCEDURE — 82436 ASSAY OF URINE CHLORIDE: CPT

## 2021-09-15 PROCEDURE — 82947 ASSAY GLUCOSE BLOOD QUANT: CPT

## 2021-09-15 PROCEDURE — 85652 RBC SED RATE AUTOMATED: CPT

## 2021-09-15 PROCEDURE — 82565 ASSAY OF CREATININE: CPT

## 2021-09-15 RX ORDER — TAMSULOSIN HYDROCHLORIDE 0.4 MG/1
2 CAPSULE ORAL
Qty: 0 | Refills: 0 | DISCHARGE
Start: 2021-09-15

## 2021-09-15 RX ORDER — WARFARIN SODIUM 2.5 MG/1
1 TABLET ORAL
Qty: 0 | Refills: 0 | DISCHARGE

## 2021-09-15 RX ORDER — MUPIROCIN 20 MG/G
1 OINTMENT TOPICAL
Qty: 0 | Refills: 0 | DISCHARGE
Start: 2021-09-15

## 2021-09-15 RX ORDER — TAMSULOSIN HYDROCHLORIDE 0.4 MG/1
1 CAPSULE ORAL
Qty: 0 | Refills: 0 | DISCHARGE

## 2021-09-15 RX ORDER — ACETAMINOPHEN 500 MG
2 TABLET ORAL
Qty: 0 | Refills: 0 | DISCHARGE
Start: 2021-09-15

## 2021-09-15 RX ORDER — INSULIN GLARGINE 100 [IU]/ML
30 INJECTION, SOLUTION SUBCUTANEOUS
Qty: 0 | Refills: 0 | DISCHARGE

## 2021-09-15 RX ADMIN — MONTELUKAST 10 MILLIGRAM(S): 4 TABLET, CHEWABLE ORAL at 11:21

## 2021-09-15 RX ADMIN — PANTOPRAZOLE SODIUM 40 MILLIGRAM(S): 20 TABLET, DELAYED RELEASE ORAL at 05:19

## 2021-09-15 RX ADMIN — Medication 0.5 MILLIGRAM(S): at 05:20

## 2021-09-15 RX ADMIN — Medication 650 MILLIGRAM(S): at 08:45

## 2021-09-15 RX ADMIN — ENOXAPARIN SODIUM 90 MILLIGRAM(S): 100 INJECTION SUBCUTANEOUS at 11:21

## 2021-09-15 RX ADMIN — FINASTERIDE 5 MILLIGRAM(S): 5 TABLET, FILM COATED ORAL at 11:21

## 2021-09-15 RX ADMIN — Medication 1: at 12:32

## 2021-09-15 RX ADMIN — Medication 25 MICROGRAM(S): at 05:20

## 2021-09-15 RX ADMIN — Medication 6 UNIT(S): at 12:32

## 2021-09-15 RX ADMIN — MUPIROCIN 1 APPLICATION(S): 20 OINTMENT TOPICAL at 05:21

## 2021-09-15 RX ADMIN — Medication 25 MILLIGRAM(S): at 05:20

## 2021-09-15 RX ADMIN — Medication 6 UNIT(S): at 08:44

## 2021-09-15 NOTE — PROGRESS NOTE ADULT - PROVIDER SPECIALTY LIST ADULT
Nephrology
Nephrology
Podiatry
Vascular Surgery
Nephrology
Nephrology
Podiatry
Vascular Surgery
Infectious Disease
Internal Medicine
Internal Medicine
Nephrology
Nephrology
Surgery
Infectious Disease
Nephrology
Nephrology
Podiatry
Surgery
Internal Medicine

## 2021-09-15 NOTE — PROGRESS NOTE ADULT - SUBJECTIVE AND OBJECTIVE BOX
[facility] - ORESTES Wall / ORESTES Wynne / KARIE Melendez/ ORESTES Knight/ ORESTES Jang/ ADRIA Lee / TOÑITO Mora / ROBB Kapoor  ---------------------------------------------------------------------------------------------------------------  seen and examined today for KARLOS on CKD  Interval : serum creatinine improved  VITALS:  T(F): 97.5 (09-15-21 @ 10:29), Max: 98 (09-14-21 @ 21:44)  HR: 74 (09-15-21 @ 10:29)  BP: 155/73 (09-15-21 @ 10:29)  RR: 18 (09-15-21 @ 10:29)  SpO2: 97% (09-15-21 @ 10:29)  Wt(kg): --    09-14 @ 07:01  -  09-15 @ 07:00  --------------------------------------------------------  IN: 100 mL / OUT: 550 mL / NET: -450 mL    09-15 @ 07:01  -  09-15 @ 12:58  --------------------------------------------------------  IN: 240 mL / OUT: 0 mL / NET: 240 mL      Physical Exam :-  Constitutional: NAD  Neck: Supple.  Respiratory: Bilateral equal breath sounds,  Cardiovascular: S1, S2 normal,  Gastrointestinal: Bowel Sounds present, soft, non tender.  Extremities: No edema  Neurological: Alert and Oriented x 3, no focal deficits  Psychiatric: Normal mood, normal affect  Data:-  Allergies :   No Known Allergies    Hospital Medications:   MEDICATIONS  (STANDING):  atorvastatin 40 milliGRAM(s) Oral at bedtime  buDESOnide    Inhalation Suspension 0.5 milliGRAM(s) Inhalation two times a day  dextrose 50% Injectable 25 Gram(s) IV Push once  dextrose 50% Injectable 12.5 Gram(s) IV Push once  enoxaparin Injectable 90 milliGRAM(s) SubCutaneous two times a day  finasteride 5 milliGRAM(s) Oral daily  insulin glargine Injectable (LANTUS) 22 Unit(s) SubCutaneous at bedtime  insulin lispro (ADMELOG) corrective regimen sliding scale   SubCutaneous at bedtime  insulin lispro (ADMELOG) corrective regimen sliding scale   SubCutaneous three times a day before meals  insulin lispro Injectable (ADMELOG) 6 Unit(s) SubCutaneous three times a day before meals  levothyroxine 25 MICROGram(s) Oral daily  metoprolol succinate ER 25 milliGRAM(s) Oral daily  montelukast 10 milliGRAM(s) Oral daily  mupirocin 2% Ointment 1 Application(s) Topical two times a day  pantoprazole    Tablet 40 milliGRAM(s) Oral before breakfast  sodium chloride 0.9%. 1000 milliLiter(s) (75 mL/Hr) IV Continuous <Continuous>  tamsulosin 0.8 milliGRAM(s) Oral at bedtime    09-15    145  |  108  |  35<H>  ----------------------------<  123<H>  4.3   |  23  |  1.17    Ca    9.5      15 Sep 2021 07:07      Creatinine Trend: 1.17 <--, 1.35 <--, 1.46 <--, 1.28 <--, 1.65 <--, 1.45 <--                        9.7    7.51  )-----------( 210      ( 15 Sep 2021 07:07 )             32.0

## 2021-09-15 NOTE — PROGRESS NOTE ADULT - REASON FOR ADMISSION
R toe pain

## 2021-09-15 NOTE — PROGRESS NOTE ADULT - ASSESSMENT
86M with PMH of HTN, HLD, Afib on coumadin, CHF, T2DM on insulin, COPD, CVA, BPH p/w R toe pain and ulcer after trauma.  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

## 2021-09-15 NOTE — PHARMACOTHERAPY INTERVENTION NOTE - COMMENTS
87 yo M with PAD, CHF, afib on coumandin here for toe ulcer. Currently bridging with lovenox 90mg SQ Q12H and warfarin. INR 1.99 today (from 1.64). Home dose of warfarin 5mg daily, but now therapeutic with 7.5mg dosing. Recommend discontinuing lovenox and discharge with warfarin 7.5mg PO HS (alternatively can give next of lovenox dose at noon prior to discharge).    Wilder Bangura, PharmD, BCPS  [phone]  Available on [employer] 85 yo M with PAD, CHF, afib on coumandin here for toe ulcer. Currently bridging with lovenox 90mg SQ Q12H and warfarin. INR 1.99 today (from 1.64). Home dose of warfarin 5mg daily, but now therapeutic with 7.5mg dosing. Recommend discontinuing lovenox and discharge with warfarin 7.5mg PO HS (alternatively can give next lovenox dose at noon prior to discharge).    Wilder Bangura, PharmD, BCPS  [phone]  Available on [employer]

## 2021-09-15 NOTE — PHARMACOTHERAPY INTERVENTION NOTE - INTERVENTION TYPE RECOOMEND
Dose Optimization/Non-renal Dose Adjustment
Therapy Discontinuation Recommended - No indication
Therapy Recommended - Drug indicated but not ordered
Dose Optimization/Non-renal Dose Adjustment

## 2021-09-17 ENCOUNTER — APPOINTMENT (OUTPATIENT)
Dept: VASCULAR SURGERY | Facility: CLINIC | Age: 86
End: 2021-09-17

## 2021-09-30 ENCOUNTER — APPOINTMENT (OUTPATIENT)
Dept: VASCULAR SURGERY | Facility: CLINIC | Age: 86
End: 2021-09-30
Payer: MEDICARE

## 2021-09-30 VITALS
WEIGHT: 206 LBS | HEART RATE: 93 BPM | BODY MASS INDEX: 30.51 KG/M2 | DIASTOLIC BLOOD PRESSURE: 71 MMHG | TEMPERATURE: 97.6 F | SYSTOLIC BLOOD PRESSURE: 119 MMHG | HEIGHT: 69 IN

## 2021-09-30 PROCEDURE — 99212 OFFICE O/P EST SF 10 MIN: CPT

## 2021-09-30 NOTE — REASON FOR VISIT
[de-identified] : RLE diagnostic angiogram [de-identified] : 09/08/21 [de-identified] : Ongoing moderate to severe pain in right toes. Worsening discoloration of right toes. No new wounds [Spouse] : spouse

## 2021-09-30 NOTE — PHYSICAL EXAM
[JVD] : no jugular venous distention  [Respiratory Effort] : normal respiratory effort [Normal Rate and Rhythm] : normal rate and rhythm [2+] : left 2+ [0] : left 0 [Ankle Swelling (On Exam)] : present [Ankle Swelling Bilaterally] : bilaterally  [Varicose Veins Of Lower Extremities] : not present [] : bilaterally [Ankle Swelling On The Left] : moderate [Skin Ulcer] : ulcer [Alert] : alert [Oriented to Person] : oriented to person [Oriented to Place] : oriented to place [Oriented to Time] : oriented to time [Calm] : calm [de-identified] : appears stated age [de-identified] : normocephalic, atraumatic [de-identified] : worsening ischemic changes to right 2-4th toes, forefoot ischemia. no signs of infection

## 2021-09-30 NOTE — DISCUSSION/SUMMARY
[FreeTextEntry1] : Problem #1 PVD\par - Pt is seeing his podiatrist tomorrow\par - Reasonable to allow local wound care for now\par - However if there is suspicion or diagnosis of osteomyelitis I have counseled the patient and his family that a right below knee amputation is inevitable given his degree of ischemia without revascularization options as he is too high risk for surgical bypass and not a candidate for endovascular revascularization

## 2021-10-14 ENCOUNTER — APPOINTMENT (OUTPATIENT)
Dept: VASCULAR SURGERY | Facility: CLINIC | Age: 86
End: 2021-10-14
Payer: MEDICARE

## 2021-10-14 VITALS
HEIGHT: 69 IN | WEIGHT: 200 LBS | DIASTOLIC BLOOD PRESSURE: 67 MMHG | BODY MASS INDEX: 29.62 KG/M2 | TEMPERATURE: 97.5 F | SYSTOLIC BLOOD PRESSURE: 128 MMHG | HEART RATE: 73 BPM

## 2021-10-14 DIAGNOSIS — Z80.9 FAMILY HISTORY OF MALIGNANT NEOPLASM, UNSPECIFIED: ICD-10-CM

## 2021-10-14 DIAGNOSIS — Z78.9 OTHER SPECIFIED HEALTH STATUS: ICD-10-CM

## 2021-10-14 DIAGNOSIS — Z87.891 PERSONAL HISTORY OF NICOTINE DEPENDENCE: ICD-10-CM

## 2021-10-14 PROCEDURE — 99213 OFFICE O/P EST LOW 20 MIN: CPT

## 2021-10-14 NOTE — HISTORY OF PRESENT ILLNESS
[FreeTextEntry1] : 86 year old male with hx of PVD\par presented with RLE ischemia affecting his R 2nd toe\par underwent diagnostic angiogram\par not a candidate for revascularization\par  [de-identified] : presents for routine follow-up without any new complaints. denies pain in his right foot. denies fevers or chills.

## 2021-10-14 NOTE — ASSESSMENT
[FreeTextEntry1] : Problem #1 Peripheral vascular disease\par - Stable right foot ischemia\par - no ulcerations present\par - patient was seen by his podiatrist who reportedly wants to manage conservatively \par - no evidence of infection\par - follow up in one month for re-evaluation\par - instructed to call back sooner if pt develops worsening right foot pain, discoloration, drainage, fevers, or chills

## 2021-10-14 NOTE — PHYSICAL EXAM
[JVD] : no jugular venous distention  [Respiratory Effort] : normal respiratory effort [Normal Rate and Rhythm] : normal rate and rhythm [2+] : left 2+ [0] : left 0 [Ankle Swelling (On Exam)] : present [Ankle Swelling Bilaterally] : bilaterally  [Varicose Veins Of Lower Extremities] : not present [] : bilaterally [Ankle Swelling On The Left] : moderate [Skin Ulcer] : ulcer [Alert] : alert [Oriented to Person] : oriented to person [Oriented to Place] : oriented to place [Oriented to Time] : oriented to time [Calm] : calm [de-identified] : appears stated age [de-identified] : normocephalic, atraumatic [de-identified] : stable ischemic changes to right toes. slight increase in right foot edema without erythema. no signs of infection

## 2021-11-02 NOTE — PHYSICAL THERAPY INITIAL EVALUATION ADULT - ASSISTIVE DEVICE FOR TRANSFER: GAIT, REHAB EVAL
rolling walker Split-Thickness Skin Graft Text: The defect edges were debeveled with a #15 scalpel blade.  Given the location of the defect, shape of the defect and the proximity to free margins a split thickness skin graft was deemed most appropriate.  Using a sterile surgical marker, the primary defect shape was transferred to the donor site. The split thickness graft was then harvested.  The skin graft was then placed in the primary defect and oriented appropriately.

## 2021-11-05 ENCOUNTER — APPOINTMENT (OUTPATIENT)
Dept: ULTRASOUND IMAGING | Facility: IMAGING CENTER | Age: 86
End: 2021-11-05
Payer: MEDICARE

## 2021-11-05 ENCOUNTER — APPOINTMENT (OUTPATIENT)
Dept: UROLOGY | Facility: CLINIC | Age: 86
End: 2021-11-05
Payer: MEDICARE

## 2021-11-05 ENCOUNTER — OUTPATIENT (OUTPATIENT)
Dept: OUTPATIENT SERVICES | Facility: HOSPITAL | Age: 86
LOS: 1 days | End: 2021-11-05
Payer: MEDICARE

## 2021-11-05 ENCOUNTER — APPOINTMENT (OUTPATIENT)
Dept: CARDIOLOGY | Facility: CLINIC | Age: 86
End: 2021-11-05

## 2021-11-05 DIAGNOSIS — N40.0 BENIGN PROSTATIC HYPERPLASIA WITHOUT LOWER URINARY TRACT SYMPMS: ICD-10-CM

## 2021-11-05 DIAGNOSIS — R33.9 RETENTION OF URINE, UNSPECIFIED: ICD-10-CM

## 2021-11-05 DIAGNOSIS — N18.30 CHRONIC KIDNEY DISEASE, STAGE 3 UNSPECIFIED: ICD-10-CM

## 2021-11-05 DIAGNOSIS — Z98.49 CATARACT EXTRACTION STATUS, UNSPECIFIED EYE: Chronic | ICD-10-CM

## 2021-11-05 DIAGNOSIS — Z98.890 OTHER SPECIFIED POSTPROCEDURAL STATES: Chronic | ICD-10-CM

## 2021-11-05 DIAGNOSIS — N40.1 BENIGN PROSTATIC HYPERPLASIA WITH LOWER URINARY TRACT SYMPTOMS: ICD-10-CM

## 2021-11-05 PROCEDURE — 76775 US EXAM ABDO BACK WALL LIM: CPT

## 2021-11-05 PROCEDURE — 76775 US EXAM ABDO BACK WALL LIM: CPT | Mod: 26

## 2021-11-05 PROCEDURE — 99213 OFFICE O/P EST LOW 20 MIN: CPT

## 2021-11-05 NOTE — HISTORY OF PRESENT ILLNESS
[FreeTextEntry1] : 86 yr old male presents to establish care. Pt referred by PCP for elevated creatinine, however has been stable.\par \par retention, but managing well- returns today for renal US at radiology to eval upper tracts. O/w comfortable. No hematuria, dysuria, fever, discomfort/pain.\par \par Creatinine\par 1.55 - 5/21\par 1.48 -- 4/21\par 1.85 -- 3/21\par 1.41 -- 8/2020 \par

## 2021-11-05 NOTE — PHYSICAL EXAM
[General Appearance - Well Developed] : well developed [General Appearance - Well Nourished] : well nourished [Normal Appearance] : normal appearance [Well Groomed] : well groomed [General Appearance - In No Acute Distress] : no acute distress [Abdomen Soft] : soft [Abdomen Tenderness] : non-tender [Costovertebral Angle Tenderness] : no ~M costovertebral angle tenderness [Urinary Bladder Findings] : the bladder was normal on palpation [Edema] : no peripheral edema [] : no respiratory distress [Respiration, Rhythm And Depth] : normal respiratory rhythm and effort [Exaggerated Use Of Accessory Muscles For Inspiration] : no accessory muscle use [Oriented To Time, Place, And Person] : oriented to person, place, and time [Affect] : the affect was normal [Mood] : the mood was normal [Not Anxious] : not anxious [No Focal Deficits] : no focal deficits

## 2021-11-18 NOTE — PHYSICAL EXAM
[JVD] : no jugular venous distention  [Respiratory Effort] : normal respiratory effort [Normal Rate and Rhythm] : normal rate and rhythm [2+] : left 2+ [0] : left 0 [Ankle Swelling (On Exam)] : present [Ankle Swelling Bilaterally] : bilaterally  [Varicose Veins Of Lower Extremities] : not present [] : bilaterally [Ankle Swelling On The Left] : moderate [Skin Ulcer] : ulcer [Alert] : alert [Oriented to Person] : oriented to person [Oriented to Place] : oriented to place [Oriented to Time] : oriented to time [Calm] : calm [de-identified] : appears stated age [de-identified] : normocephalic, atraumatic [de-identified] : stable ischemic changes to right toes. stable right foot edema without erythema. no signs of infection.

## 2021-11-18 NOTE — END OF VISIT
[facility]  INFECTIOUS DISEASES   [address] [place] [address]  Tel: [phone]     Fax: [phone]  =======================================================    MRN-[id number]  DAVID DRISCOLL     Follow up; Pancreatitis    Liver enzymes and Amylase normalizing, but leukocytosis is worse today 19k and had fever over the weekend.   Pain in epigastric area.     PAST MEDICAL & SURGICAL HISTORY:  H/O cholecystitis  History of cholestasis during pregnancy  S/P D&amp;C (status post dilation and curettage)  x2 for miscarriages  History of cholecystectomy    Social Hx: no smoking, ETOH or drugs     FAMILY HISTORY:  No pertinent family history in first degree relatives    Allergies  cephalosporins (Rash (Moderate))  Cipro (Rash (Moderate))    Antibiotics:  None      REVIEW OF SYSTEMS:  CONSTITUTIONAL:  No Fever or chills  HEENT:  No diplopia or blurred vision.  No sore throat or runny nose.  CARDIOVASCULAR:  No chest pain or SOB.  RESPIRATORY:  No cough, shortness of breath, PND or orthopnea.  GASTROINTESTINAL:  No nausea, vomiting or diarrhea. + abdominal pain   GENITOURINARY:  No dysuria, frequency or urgency. No Blood in urine  MUSCULOSKELETAL:  no joint aches, no muscle pain  SKIN:  No change in skin, hair or nails.  NEUROLOGIC:  No paresthesias, fasciculations, seizures or weakness.  PSYCHIATRIC:  No disorder of thought or mood.  ENDOCRINE:  No heat or cold intolerance, polyuria or polydipsia.  HEMATOLOGICAL:  No easy bruising or bleeding.     Physical Exam:  Vital Signs Last 24 Hrs  T(C): 37.6 (16 Nov 2020 14:04), Max: 37.6 (16 Nov 2020 14:04)  T(F): 99.6 (16 Nov 2020 14:04), Max: 99.6 (16 Nov 2020 14:04)  HR: 107 (16 Nov 2020 14:04) (95 - 108)  BP: 105/69 (16 Nov 2020 14:04) (98/62 - 105/69)  BP(mean): --  RR: 18 (16 Nov 2020 14:04) (17 - 18)  SpO2: 94% (16 Nov 2020 14:04) (93% - 94%)  GEN: NAD  HEENT: normocephalic and atraumatic. EOMI. PERRL.    NECK: Supple.  No lymphadenopathy   LUNGS: Clear to auscultation.  HEART: Regular rate and rhythm without murmur.  ABDOMEN: Soft, epigastric tenderness better, nondistended.  Positive bowel sounds.    : No CVA tenderness  EXTREMITIES: Without any cyanosis, clubbing, rash, lesions or edema.  NEUROLOGIC: grossly intact.  PSYCHIATRIC: Appropriate affect .  SKIN: No ulceration or induration present.    Labs:                        11.0   19.91 )-----------( 506      ( 16 Nov 2020 09:39 )             33.2      11-16    134<L>  |  99  |  4<L>  ----------------------------<  73  3.3<L>   |  23  |  0.44<L>    Ca    8.6      16 Nov 2020 09:39    TPro  6.3  /  Alb  2.0<L>  /  TBili  1.0  /  DBili  x   /  AST  37  /  ALT  65  /  AlkPhos  395<H>  11-15    Culture - Urine (collected 11-15-20 @ 05:15)  Source: .Urine Clean Catch (Midstream)  Final Report (11-16-20 @ 08:19):    <10,000 CFU/mL Normal Urogenital Megan    Culture - Blood (collected 11-15-20 @ 00:35)  Source: .Blood Blood-Peripheral    Culture - Blood (collected 11-15-20 @ 00:35)  Source: .Blood Blood-Peripheral    Culture - Blood (collected 11-12-20 @ 18:44)  Source: .Blood Blood-Peripheral 2anaerobic    Culture - Blood (collected 11-12-20 @ 18:44)  Source: .Blood Blood-Peripheral    Culture - Urine (collected 11-11-20 @ 19:10)  Source: .Urine Clean Catch (Midstream)  Final Report (11-12-20 @ 15:13):    <10,000 CFU/mL Normal Urogenital Megan    WBC Count: 19.91 K/uL (11-16-20 @ 09:39)  WBC Count: 16.89 K/uL (11-15-20 @ 09:18)  WBC Count: 16.54 K/uL (11-14-20 @ 21:19)  WBC Count: 17.24 K/uL (11-14-20 @ 09:32)  WBC Count: 15.58 K/uL (11-13-20 @ 08:11)  WBC Count: 14.83 K/uL (11-12-20 @ 08:50)    Creatinine, Serum: 0.44 mg/dL (11-16-20 @ 09:39)  Creatinine, Serum: 0.43 mg/dL (11-15-20 @ 09:18)  Creatinine, Serum: 0.49 mg/dL (11-14-20 @ 09:32)  Creatinine, Serum: 0.35 mg/dL (11-13-20 @ 08:11)  Creatinine, Serum: 0.60 mg/dL (11-12-20 @ 08:50)    Procalcitonin, Serum: <0.05 (11-09-20 @ 08:38)    COVID-19 IgG Antibody Index: 0.09 Index (11-08-20 @ 14:09)  COVID-19 IgG Antibody Interpretation: Negative (11-08-20 @ 14:09)  COVID-19 PCR: NotDetec (11-08-20 @ 06:03)    All imaging and other data have been reviewed.  < from: CT Abdomen and Pelvis No Cont (11.10.20 @ 01:36) >  IMPRESSION:  Status post cholecystectomy.  Findings compatible with acute interstitial pancreatitis.  The evaluation for pancreatic necrosis is limited without intravenous contrast.  Free intraperitoneal air, likely related to recent postoperative status.  Small amount of free fluid.    Assessment and Plan:   23 T/o woman with PMH of cholestasis of pregnancy 2 years ago, cholecystitis s/p cholecystectomy on 11/6 in [place], was admitted with acute RUQ pain on 11/8. She was diagnosed with retained CBD stone and had ERCP for remove stone with stent placement on 11/9. After procedure she had pancreatitis with severe epigastric pain, lipase went up to >69108. The same day had leukocytosis as well.   I believe leukocytosis is reactional to inflammation in pancreas, as lipase is going down and inflammation is resolving, WBC will start trending down as well. If leukocytosis continues with persistent fever or any positive culture will start her on ABx otherwise will watch off antibiotics.     11/16: had fever 2 days ago, leukocytosis worse. CT with multiple collections around pancreas, encapsulated.     Pancreatitis, CBD stone s/p ERCP and stent   - Blood and urine cultures are NGTD  - Will follow leukocytosis   - Liver enzymes are normalizing  - Amylase is normal   - Repeat CT with collection but no necrosis   - Will start zosyn 3.375gm q8h   - GI following     Will follow PRN.     Ting Olea MD  Division of Infectious Diseases   Cell [phone] between 8am and 6pm      [Time Spent: ___ minutes] : I have spent [unfilled] minutes of time on the encounter. [facility]  INFECTIOUS DISEASES   [address] [place] [address]  Tel: [phone]     Fax: [phone]  =======================================================    MRN-[id number]  DAVID DRISCOLL     Follow up; Pancreatitis    Liver enzymes and Amylase normalizing, but leukocytosis is worse today 19k and had fever over the weekend.   Pain in epigastric area.     PAST MEDICAL & SURGICAL HISTORY:  H/O cholecystitis  History of cholestasis during pregnancy  S/P D&amp;C (status post dilation and curettage)  x2 for miscarriages  History of cholecystectomy    Social Hx: no smoking, ETOH or drugs     FAMILY HISTORY:  No pertinent family history in first degree relatives    Allergies  cephalosporins (Rash (Moderate))  Cipro (Rash (Moderate))    Antibiotics:  None      REVIEW OF SYSTEMS:  CONSTITUTIONAL:  No Fever or chills  HEENT:  No diplopia or blurred vision.  No sore throat or runny nose.  CARDIOVASCULAR:  No chest pain or SOB.  RESPIRATORY:  No cough, shortness of breath, PND or orthopnea.  GASTROINTESTINAL:  No nausea, vomiting or diarrhea. + abdominal pain   GENITOURINARY:  No dysuria, frequency or urgency. No Blood in urine  MUSCULOSKELETAL:  no joint aches, no muscle pain  SKIN:  No change in skin, hair or nails.  NEUROLOGIC:  No paresthesias, fasciculations, seizures or weakness.  PSYCHIATRIC:  No disorder of thought or mood.  ENDOCRINE:  No heat or cold intolerance, polyuria or polydipsia.  HEMATOLOGICAL:  No easy bruising or bleeding.     Physical Exam:  Vital Signs Last 24 Hrs  T(C): 37.6 (16 Nov 2020 14:04), Max: 37.6 (16 Nov 2020 14:04)  T(F): 99.6 (16 Nov 2020 14:04), Max: 99.6 (16 Nov 2020 14:04)  HR: 107 (16 Nov 2020 14:04) (95 - 108)  BP: 105/69 (16 Nov 2020 14:04) (98/62 - 105/69)  BP(mean): --  RR: 18 (16 Nov 2020 14:04) (17 - 18)  SpO2: 94% (16 Nov 2020 14:04) (93% - 94%)  GEN: NAD  HEENT: normocephalic and atraumatic. EOMI. PERRL.    NECK: Supple.  No lymphadenopathy   LUNGS: Clear to auscultation.  HEART: Regular rate and rhythm without murmur.  ABDOMEN: Soft, epigastric tenderness better, nondistended.  Positive bowel sounds.    : No CVA tenderness  EXTREMITIES: Without any cyanosis, clubbing, rash, lesions or edema.  NEUROLOGIC: grossly intact.  PSYCHIATRIC: Appropriate affect .  SKIN: No ulceration or induration present.    Labs:                        11.0   19.91 )-----------( 506      ( 16 Nov 2020 09:39 )             33.2      11-16    134<L>  |  99  |  4<L>  ----------------------------<  73  3.3<L>   |  23  |  0.44<L>    Ca    8.6      16 Nov 2020 09:39    TPro  6.3  /  Alb  2.0<L>  /  TBili  1.0  /  DBili  x   /  AST  37  /  ALT  65  /  AlkPhos  395<H>  11-15    Culture - Urine (collected 11-15-20 @ 05:15)  Source: .Urine Clean Catch (Midstream)  Final Report (11-16-20 @ 08:19):    <10,000 CFU/mL Normal Urogenital Megan    Culture - Blood (collected 11-15-20 @ 00:35)  Source: .Blood Blood-Peripheral    Culture - Blood (collected 11-15-20 @ 00:35)  Source: .Blood Blood-Peripheral    Culture - Blood (collected 11-12-20 @ 18:44)  Source: .Blood Blood-Peripheral 2anaerobic    Culture - Blood (collected 11-12-20 @ 18:44)  Source: .Blood Blood-Peripheral    Culture - Urine (collected 11-11-20 @ 19:10)  Source: .Urine Clean Catch (Midstream)  Final Report (11-12-20 @ 15:13):    <10,000 CFU/mL Normal Urogenital Megan    WBC Count: 19.91 K/uL (11-16-20 @ 09:39)  WBC Count: 16.89 K/uL (11-15-20 @ 09:18)  WBC Count: 16.54 K/uL (11-14-20 @ 21:19)  WBC Count: 17.24 K/uL (11-14-20 @ 09:32)  WBC Count: 15.58 K/uL (11-13-20 @ 08:11)  WBC Count: 14.83 K/uL (11-12-20 @ 08:50)    Creatinine, Serum: 0.44 mg/dL (11-16-20 @ 09:39)  Creatinine, Serum: 0.43 mg/dL (11-15-20 @ 09:18)  Creatinine, Serum: 0.49 mg/dL (11-14-20 @ 09:32)  Creatinine, Serum: 0.35 mg/dL (11-13-20 @ 08:11)  Creatinine, Serum: 0.60 mg/dL (11-12-20 @ 08:50)    Procalcitonin, Serum: <0.05 (11-09-20 @ 08:38)    COVID-19 IgG Antibody Index: 0.09 Index (11-08-20 @ 14:09)  COVID-19 IgG Antibody Interpretation: Negative (11-08-20 @ 14:09)  COVID-19 PCR: NotDetec (11-08-20 @ 06:03)    All imaging and other data have been reviewed.  < from: CT Abdomen and Pelvis No Cont (11.10.20 @ 01:36) >  IMPRESSION:  Status post cholecystectomy.  Findings compatible with acute interstitial pancreatitis.  The evaluation for pancreatic necrosis is limited without intravenous contrast.  Free intraperitoneal air, likely related to recent postoperative status.  Small amount of free fluid.    Assessment and Plan:   23 T/o woman with PMH of cholestasis of pregnancy 2 years ago, cholecystitis s/p cholecystectomy on 11/6 in [place], was admitted with acute RUQ pain on 11/8. She was diagnosed with retained CBD stone and had ERCP for remove stone with stent placement on 11/9. After procedure she had pancreatitis with severe epigastric pain, lipase went up to >74752. The same day had leukocytosis as well.   I believe leukocytosis is reactional to inflammation in pancreas, as lipase is going down and inflammation is resolving, WBC will start trending down as well. If leukocytosis continues with persistent fever or any positive culture will start her on ABx otherwise will watch off antibiotics.     11/16: had fever 2 days ago, leukocytosis worse. CT with multiple collections around pancreas, no necrosis, clinically better on regular food with no nausea or vomiting.     Pancreatitis, CBD stone s/p ERCP and stent   - Blood and urine cultures are NGTD  - Will follow leukocytosis   - Liver enzymes are normalizing  - Amylase is normal   - Repeat CT with collection but no necrosis   - Will watch off antibiotics   - GI following     Will follow PRN.     Ting Olea MD  Division of Infectious Diseases   Cell [phone] between 8am and 6pm

## 2021-11-18 NOTE — HISTORY OF PRESENT ILLNESS
[FreeTextEntry1] : 86 year old male with hx of PVD\par presented with RLE ischemia affecting his R 2nd toe\par underwent diagnostic angiogram\par not a candidate for revascularization\par  [de-identified] : presents for routine follow-up. now having rest pain in right foot on some nights but not others. denies fevers or chills.

## 2021-11-18 NOTE — ASSESSMENT
[FreeTextEntry1] : Problem #1 Chronic limb threatening ischemia of right lower extremity\par - stable ischemic changes to right forefoot and toes\par - no signs of infection\par - now has developed intermittent rest pain at night\par - does not want to pursue amputation at this time\par - remains very high risk for limb loss\par - will see his podiatrist in 2 weeks\par - follow up with me in one month. encouraged to call office sooner if he develops any signs of local or systemic infection or progression of rest pain that becomes unbearable

## 2022-01-01 ENCOUNTER — APPOINTMENT (OUTPATIENT)
Dept: MEDICATION MANAGEMENT | Facility: CLINIC | Age: 87
End: 2022-01-01

## 2022-01-01 ENCOUNTER — LABORATORY RESULT (OUTPATIENT)
Age: 87
End: 2022-01-01

## 2022-01-01 ENCOUNTER — APPOINTMENT (OUTPATIENT)
Dept: CARDIOLOGY | Facility: CLINIC | Age: 87
End: 2022-01-01

## 2022-01-01 ENCOUNTER — INPATIENT (INPATIENT)
Facility: HOSPITAL | Age: 87
LOS: 8 days | Discharge: NURSING, REHAB OR LONG-TERM CARE | DRG: 291 | End: 2022-09-23
Attending: INTERNAL MEDICINE | Admitting: INTERNAL MEDICINE
Payer: MEDICARE

## 2022-01-01 ENCOUNTER — APPOINTMENT (OUTPATIENT)
Dept: VASCULAR SURGERY | Facility: CLINIC | Age: 87
End: 2022-01-01
Payer: MEDICARE

## 2022-01-01 ENCOUNTER — APPOINTMENT (OUTPATIENT)
Dept: ELECTROPHYSIOLOGY | Facility: CLINIC | Age: 87
End: 2022-01-01
Payer: MEDICARE

## 2022-01-01 ENCOUNTER — APPOINTMENT (OUTPATIENT)
Dept: VASCULAR SURGERY | Facility: CLINIC | Age: 87
End: 2022-01-01

## 2022-01-01 ENCOUNTER — OUTPATIENT (OUTPATIENT)
Dept: OUTPATIENT SERVICES | Facility: HOSPITAL | Age: 87
LOS: 1 days | Discharge: HOME | End: 2022-01-01

## 2022-01-01 ENCOUNTER — APPOINTMENT (OUTPATIENT)
Dept: CARDIOLOGY | Facility: CLINIC | Age: 87
End: 2022-01-01
Payer: MEDICARE

## 2022-01-01 ENCOUNTER — NON-APPOINTMENT (OUTPATIENT)
Age: 87
End: 2022-01-01

## 2022-01-01 ENCOUNTER — TRANSCRIPTION ENCOUNTER (OUTPATIENT)
Age: 87
End: 2022-01-01

## 2022-01-01 ENCOUNTER — RESULT REVIEW (OUTPATIENT)
Age: 87
End: 2022-01-01

## 2022-01-01 ENCOUNTER — INPATIENT (INPATIENT)
Facility: HOSPITAL | Age: 87
LOS: 31 days | Discharge: NURSING, REHAB OR LONG-TERM CARE | DRG: 239 | End: 2022-05-03
Attending: STUDENT IN AN ORGANIZED HEALTH CARE EDUCATION/TRAINING PROGRAM
Payer: MEDICARE

## 2022-01-01 ENCOUNTER — APPOINTMENT (OUTPATIENT)
Dept: THORACIC SURGERY | Facility: HOSPITAL | Age: 87
End: 2022-01-01

## 2022-01-01 ENCOUNTER — OUTPATIENT (OUTPATIENT)
Dept: OUTPATIENT SERVICES | Facility: HOSPITAL | Age: 87
LOS: 1 days | End: 2022-01-01
Payer: MEDICARE

## 2022-01-01 ENCOUNTER — INPATIENT (INPATIENT)
Facility: HOSPITAL | Age: 87
LOS: 19 days | Discharge: EXPIRED | End: 2022-11-13
Attending: HOSPITALIST | Admitting: HOSPITALIST

## 2022-01-01 ENCOUNTER — APPOINTMENT (OUTPATIENT)
Dept: UROLOGY | Facility: CLINIC | Age: 87
End: 2022-01-01

## 2022-01-01 ENCOUNTER — APPOINTMENT (OUTPATIENT)
Dept: ELECTROPHYSIOLOGY | Facility: CLINIC | Age: 87
End: 2022-01-01

## 2022-01-01 ENCOUNTER — RX RENEWAL (OUTPATIENT)
Age: 87
End: 2022-01-01

## 2022-01-01 VITALS
HEIGHT: 69 IN | TEMPERATURE: 97.6 F | DIASTOLIC BLOOD PRESSURE: 68 MMHG | WEIGHT: 206 LBS | BODY MASS INDEX: 30.51 KG/M2 | SYSTOLIC BLOOD PRESSURE: 144 MMHG | HEART RATE: 77 BPM

## 2022-01-01 VITALS
WEIGHT: 195 LBS | DIASTOLIC BLOOD PRESSURE: 56 MMHG | SYSTOLIC BLOOD PRESSURE: 130 MMHG | HEART RATE: 76 BPM | HEIGHT: 69 IN | BODY MASS INDEX: 28.88 KG/M2 | TEMPERATURE: 95.5 F

## 2022-01-01 VITALS
TEMPERATURE: 97 F | SYSTOLIC BLOOD PRESSURE: 90 MMHG | HEART RATE: 61 BPM | RESPIRATION RATE: 18 BRPM | OXYGEN SATURATION: 98 % | DIASTOLIC BLOOD PRESSURE: 50 MMHG

## 2022-01-01 VITALS
TEMPERATURE: 98 F | RESPIRATION RATE: 18 BRPM | OXYGEN SATURATION: 96 % | SYSTOLIC BLOOD PRESSURE: 154 MMHG | DIASTOLIC BLOOD PRESSURE: 80 MMHG | HEART RATE: 88 BPM

## 2022-01-01 VITALS
DIASTOLIC BLOOD PRESSURE: 77 MMHG | BODY MASS INDEX: 29.03 KG/M2 | SYSTOLIC BLOOD PRESSURE: 132 MMHG | HEART RATE: 80 BPM | HEIGHT: 69 IN | TEMPERATURE: 94.8 F | WEIGHT: 196 LBS

## 2022-01-01 VITALS
OXYGEN SATURATION: 97 % | SYSTOLIC BLOOD PRESSURE: 118 MMHG | TEMPERATURE: 98 F | RESPIRATION RATE: 20 BRPM | HEART RATE: 77 BPM | WEIGHT: 190.04 LBS | HEIGHT: 68 IN | DIASTOLIC BLOOD PRESSURE: 69 MMHG

## 2022-01-01 VITALS
WEIGHT: 196 LBS | HEIGHT: 69 IN | DIASTOLIC BLOOD PRESSURE: 56 MMHG | TEMPERATURE: 98 F | BODY MASS INDEX: 29.03 KG/M2 | SYSTOLIC BLOOD PRESSURE: 129 MMHG | HEART RATE: 87 BPM

## 2022-01-01 VITALS
SYSTOLIC BLOOD PRESSURE: 136 MMHG | BODY MASS INDEX: 28.88 KG/M2 | TEMPERATURE: 95.9 F | WEIGHT: 195 LBS | HEART RATE: 86 BPM | HEIGHT: 69 IN | DIASTOLIC BLOOD PRESSURE: 63 MMHG

## 2022-01-01 VITALS
OXYGEN SATURATION: 94 % | DIASTOLIC BLOOD PRESSURE: 67 MMHG | SYSTOLIC BLOOD PRESSURE: 129 MMHG | RESPIRATION RATE: 18 BRPM | HEART RATE: 70 BPM | TEMPERATURE: 98 F

## 2022-01-01 VITALS
SYSTOLIC BLOOD PRESSURE: 145 MMHG | TEMPERATURE: 98 F | DIASTOLIC BLOOD PRESSURE: 77 MMHG | OXYGEN SATURATION: 95 % | RESPIRATION RATE: 20 BRPM | HEART RATE: 86 BPM

## 2022-01-01 VITALS
RESPIRATION RATE: 16 BRPM | TEMPERATURE: 97.7 F | BODY MASS INDEX: 30.51 KG/M2 | DIASTOLIC BLOOD PRESSURE: 79 MMHG | HEART RATE: 100 BPM | HEIGHT: 69 IN | OXYGEN SATURATION: 95 % | WEIGHT: 206 LBS | SYSTOLIC BLOOD PRESSURE: 122 MMHG

## 2022-01-01 VITALS
OXYGEN SATURATION: 98 % | HEART RATE: 118 BPM | DIASTOLIC BLOOD PRESSURE: 63 MMHG | TEMPERATURE: 100 F | RESPIRATION RATE: 18 BRPM | SYSTOLIC BLOOD PRESSURE: 116 MMHG

## 2022-01-01 VITALS
DIASTOLIC BLOOD PRESSURE: 69 MMHG | HEIGHT: 69 IN | TEMPERATURE: 97.2 F | HEART RATE: 98 BPM | WEIGHT: 195 LBS | BODY MASS INDEX: 28.88 KG/M2 | SYSTOLIC BLOOD PRESSURE: 120 MMHG

## 2022-01-01 VITALS
HEART RATE: 90 BPM | BODY MASS INDEX: 29.03 KG/M2 | SYSTOLIC BLOOD PRESSURE: 111 MMHG | DIASTOLIC BLOOD PRESSURE: 60 MMHG | HEIGHT: 69 IN | WEIGHT: 196 LBS | TEMPERATURE: 97.6 F

## 2022-01-01 VITALS — HEART RATE: 80 BPM | TEMPERATURE: 96.2 F | SYSTOLIC BLOOD PRESSURE: 131 MMHG | DIASTOLIC BLOOD PRESSURE: 66 MMHG

## 2022-01-01 VITALS
OXYGEN SATURATION: 98 % | WEIGHT: 206 LBS | SYSTOLIC BLOOD PRESSURE: 133 MMHG | BODY MASS INDEX: 30.51 KG/M2 | DIASTOLIC BLOOD PRESSURE: 73 MMHG | HEART RATE: 68 BPM | HEIGHT: 69 IN | RESPIRATION RATE: 15 BRPM

## 2022-01-01 DIAGNOSIS — Z79.01 LONG TERM (CURRENT) USE OF ANTICOAGULANTS: ICD-10-CM

## 2022-01-01 DIAGNOSIS — Z98.890 OTHER SPECIFIED POSTPROCEDURAL STATES: Chronic | ICD-10-CM

## 2022-01-01 DIAGNOSIS — Z86.73 PERSONAL HISTORY OF TRANSIENT ISCHEMIC ATTACK (TIA), AND CEREBRAL INFARCTION WITHOUT RESIDUAL DEFICITS: ICD-10-CM

## 2022-01-01 DIAGNOSIS — Z98.49 CATARACT EXTRACTION STATUS, UNSPECIFIED EYE: Chronic | ICD-10-CM

## 2022-01-01 DIAGNOSIS — Z71.89 OTHER SPECIFIED COUNSELING: ICD-10-CM

## 2022-01-01 DIAGNOSIS — I10 ESSENTIAL (PRIMARY) HYPERTENSION: ICD-10-CM

## 2022-01-01 DIAGNOSIS — I50.9 HEART FAILURE, UNSPECIFIED: ICD-10-CM

## 2022-01-01 DIAGNOSIS — I25.10 ATHEROSCLEROTIC HEART DISEASE OF NATIVE CORONARY ARTERY W/OUT ANGINA PECTORIS: ICD-10-CM

## 2022-01-01 DIAGNOSIS — Z95.0 PRESENCE OF CARDIAC PACEMAKER: ICD-10-CM

## 2022-01-01 DIAGNOSIS — E78.5 HYPERLIPIDEMIA, UNSPECIFIED: ICD-10-CM

## 2022-01-01 DIAGNOSIS — Z51.81 ENCOUNTER FOR THERAPEUTIC DRUG LEVEL MONITORING: ICD-10-CM

## 2022-01-01 DIAGNOSIS — J90 PLEURAL EFFUSION, NOT ELSEWHERE CLASSIFIED: ICD-10-CM

## 2022-01-01 DIAGNOSIS — Z51.81 ENCOUNTER FOR THERAPEUTIC DRUG LVL MONITORING: ICD-10-CM

## 2022-01-01 DIAGNOSIS — I48.91 UNSPECIFIED ATRIAL FIBRILLATION: ICD-10-CM

## 2022-01-01 DIAGNOSIS — I48.20 CHRONIC ATRIAL FIBRILLATION, UNSPECIFIED: ICD-10-CM

## 2022-01-01 DIAGNOSIS — N40.0 BENIGN PROSTATIC HYPERPLASIA WITHOUT LOWER URINARY TRACT SYMPTOMS: ICD-10-CM

## 2022-01-01 DIAGNOSIS — N18.30 CHRONIC KIDNEY DISEASE, STAGE 3 UNSPECIFIED: ICD-10-CM

## 2022-01-01 DIAGNOSIS — R73.9 HYPERGLYCEMIA, UNSPECIFIED: ICD-10-CM

## 2022-01-01 DIAGNOSIS — I73.9 PERIPHERAL VASCULAR DISEASE, UNSPECIFIED: ICD-10-CM

## 2022-01-01 DIAGNOSIS — U07.1 COVID-19: ICD-10-CM

## 2022-01-01 DIAGNOSIS — I96 GANGRENE, NOT ELSEWHERE CLASSIFIED: ICD-10-CM

## 2022-01-01 DIAGNOSIS — J44.9 CHRONIC OBSTRUCTIVE PULMONARY DISEASE, UNSPECIFIED: ICD-10-CM

## 2022-01-01 DIAGNOSIS — E11.9 TYPE 2 DIABETES MELLITUS W/OUT COMPLICATIONS: ICD-10-CM

## 2022-01-01 DIAGNOSIS — I34.0 NONRHEUMATIC MITRAL (VALVE) INSUFFICIENCY: ICD-10-CM

## 2022-01-01 DIAGNOSIS — E03.9 HYPOTHYROIDISM, UNSPECIFIED: ICD-10-CM

## 2022-01-01 DIAGNOSIS — Z51.5 ENCOUNTER FOR PALLIATIVE CARE: ICD-10-CM

## 2022-01-01 DIAGNOSIS — J96.01 ACUTE RESPIRATORY FAILURE WITH HYPOXIA: ICD-10-CM

## 2022-01-01 DIAGNOSIS — Z86.79 PERSONAL HISTORY OF OTHER DISEASES OF THE CIRCULATORY SYSTEM: ICD-10-CM

## 2022-01-01 DIAGNOSIS — Z11.52 ENCOUNTER FOR SCREENING FOR COVID-19: ICD-10-CM

## 2022-01-01 DIAGNOSIS — E11.65 TYPE 2 DIABETES MELLITUS WITH HYPERGLYCEMIA: ICD-10-CM

## 2022-01-01 DIAGNOSIS — R63.8 OTHER SYMPTOMS AND SIGNS CONCERNING FOOD AND FLUID INTAKE: ICD-10-CM

## 2022-01-01 DIAGNOSIS — R53.81 OTHER MALAISE: ICD-10-CM

## 2022-01-01 DIAGNOSIS — Z79.01 ENCOUNTER FOR THERAPEUTIC DRUG LVL MONITORING: ICD-10-CM

## 2022-01-01 DIAGNOSIS — I65.23 OCCLUSION AND STENOSIS OF BILATERAL CAROTID ARTERIES: ICD-10-CM

## 2022-01-01 DIAGNOSIS — Z86.73 PERSONAL HISTORY OF TRANSIENT ISCHEMIC ATTACK (TIA), AND CEREBRAL INFARCTION W/OUT RESIDUAL DEFICITS: ICD-10-CM

## 2022-01-01 DIAGNOSIS — I44.30 UNSPECIFIED ATRIOVENTRICULAR BLOCK: ICD-10-CM

## 2022-01-01 DIAGNOSIS — R52 PAIN, UNSPECIFIED: ICD-10-CM

## 2022-01-01 LAB
A1C WITH ESTIMATED AVERAGE GLUCOSE RESULT: 6.8 % — HIGH (ref 4–5.6)
A1C WITH ESTIMATED AVERAGE GLUCOSE RESULT: 9.1 % — HIGH (ref 4–5.6)
ALBUMIN FLD-MCNC: 1.2 G/DL — SIGNIFICANT CHANGE UP
ALBUMIN SERPL ELPH-MCNC: 2.2 G/DL — LOW (ref 3.3–5)
ALBUMIN SERPL ELPH-MCNC: 2.5 G/DL — LOW (ref 3.3–5)
ALBUMIN SERPL ELPH-MCNC: 2.5 G/DL — LOW (ref 3.3–5)
ALBUMIN SERPL ELPH-MCNC: 2.6 G/DL — LOW (ref 3.3–5)
ALP SERPL-CCNC: 55 U/L — SIGNIFICANT CHANGE UP (ref 40–120)
ALP SERPL-CCNC: 79 U/L — SIGNIFICANT CHANGE UP (ref 40–120)
ALP SERPL-CCNC: 90 U/L — SIGNIFICANT CHANGE UP (ref 40–120)
ALP SERPL-CCNC: 93 U/L — SIGNIFICANT CHANGE UP (ref 40–120)
ALT FLD-CCNC: 11 U/L — SIGNIFICANT CHANGE UP (ref 10–45)
ALT FLD-CCNC: 14 U/L — SIGNIFICANT CHANGE UP (ref 10–45)
ALT FLD-CCNC: 7 U/L — SIGNIFICANT CHANGE UP (ref 4–41)
ALT FLD-CCNC: 8 U/L — SIGNIFICANT CHANGE UP (ref 4–41)
ANION GAP SERPL CALC-SCNC: 10 MMOL/L — SIGNIFICANT CHANGE UP (ref 5–17)
ANION GAP SERPL CALC-SCNC: 10 MMOL/L — SIGNIFICANT CHANGE UP (ref 5–17)
ANION GAP SERPL CALC-SCNC: 10 MMOL/L — SIGNIFICANT CHANGE UP (ref 7–14)
ANION GAP SERPL CALC-SCNC: 11 MMOL/L — SIGNIFICANT CHANGE UP (ref 5–17)
ANION GAP SERPL CALC-SCNC: 11 MMOL/L — SIGNIFICANT CHANGE UP (ref 7–14)
ANION GAP SERPL CALC-SCNC: 12 MMOL/L — SIGNIFICANT CHANGE UP (ref 5–17)
ANION GAP SERPL CALC-SCNC: 13 MMOL/L — SIGNIFICANT CHANGE UP (ref 5–17)
ANION GAP SERPL CALC-SCNC: 13 MMOL/L — SIGNIFICANT CHANGE UP (ref 7–14)
ANION GAP SERPL CALC-SCNC: 13 MMOL/L — SIGNIFICANT CHANGE UP (ref 7–14)
ANION GAP SERPL CALC-SCNC: 14 MMOL/L — SIGNIFICANT CHANGE UP (ref 5–17)
ANION GAP SERPL CALC-SCNC: 15 MMOL/L — HIGH (ref 7–14)
ANION GAP SERPL CALC-SCNC: 15 MMOL/L — SIGNIFICANT CHANGE UP (ref 5–17)
ANION GAP SERPL CALC-SCNC: 16 MMOL/L — SIGNIFICANT CHANGE UP (ref 5–17)
ANION GAP SERPL CALC-SCNC: 16 MMOL/L — SIGNIFICANT CHANGE UP (ref 5–17)
ANION GAP SERPL CALC-SCNC: 17 MMOL/L — HIGH (ref 7–14)
ANION GAP SERPL CALC-SCNC: 17 MMOL/L — SIGNIFICANT CHANGE UP (ref 5–17)
ANION GAP SERPL CALC-SCNC: 18 MMOL/L — HIGH (ref 5–17)
ANION GAP SERPL CALC-SCNC: 18 MMOL/L — HIGH (ref 7–14)
ANION GAP SERPL CALC-SCNC: 2 MMOL/L — LOW (ref 7–14)
ANION GAP SERPL CALC-SCNC: 6 MMOL/L — LOW (ref 7–14)
ANION GAP SERPL CALC-SCNC: 7 MMOL/L — SIGNIFICANT CHANGE UP (ref 7–14)
ANION GAP SERPL CALC-SCNC: 8 MMOL/L — SIGNIFICANT CHANGE UP (ref 5–17)
ANION GAP SERPL CALC-SCNC: 8 MMOL/L — SIGNIFICANT CHANGE UP (ref 5–17)
ANION GAP SERPL CALC-SCNC: 8 MMOL/L — SIGNIFICANT CHANGE UP (ref 7–14)
ANION GAP SERPL CALC-SCNC: 9 MMOL/L — SIGNIFICANT CHANGE UP (ref 5–17)
ANION GAP SERPL CALC-SCNC: 9 MMOL/L — SIGNIFICANT CHANGE UP (ref 7–14)
APPEARANCE UR: ABNORMAL
APPEARANCE UR: CLEAR — SIGNIFICANT CHANGE UP
APTT BLD: 100.8 SEC — HIGH (ref 27.5–35.5)
APTT BLD: 104.9 SEC — HIGH (ref 27.5–35.5)
APTT BLD: 104.9 SEC — HIGH (ref 27.5–35.5)
APTT BLD: 115.4 SEC — HIGH (ref 27.5–35.5)
APTT BLD: 133.4 SEC — CRITICAL HIGH (ref 27.5–35.5)
APTT BLD: 147.3 SEC — CRITICAL HIGH (ref 27.5–35.5)
APTT BLD: 191.3 SEC — CRITICAL HIGH (ref 27.5–35.5)
APTT BLD: 27.6 SEC — SIGNIFICANT CHANGE UP (ref 27.5–35.5)
APTT BLD: 28 SEC — SIGNIFICANT CHANGE UP (ref 27.5–35.5)
APTT BLD: 28 SEC — SIGNIFICANT CHANGE UP (ref 27.5–35.5)
APTT BLD: 30 SEC — SIGNIFICANT CHANGE UP (ref 27.5–35.5)
APTT BLD: 30.1 SEC — SIGNIFICANT CHANGE UP (ref 27.5–35.5)
APTT BLD: 30.4 SEC — SIGNIFICANT CHANGE UP (ref 27.5–35.5)
APTT BLD: 31 SEC — SIGNIFICANT CHANGE UP (ref 27.5–35.5)
APTT BLD: 33.4 SEC — SIGNIFICANT CHANGE UP (ref 27–36.3)
APTT BLD: 33.6 SEC — SIGNIFICANT CHANGE UP (ref 27–36.3)
APTT BLD: 34.1 SEC — SIGNIFICANT CHANGE UP (ref 27–36.3)
APTT BLD: 34.2 SEC — SIGNIFICANT CHANGE UP (ref 27–36.3)
APTT BLD: 34.2 SEC — SIGNIFICANT CHANGE UP (ref 27–36.3)
APTT BLD: 34.5 SEC — SIGNIFICANT CHANGE UP (ref 27–36.3)
APTT BLD: 34.8 SEC — SIGNIFICANT CHANGE UP (ref 27.5–35.5)
APTT BLD: 35 SEC — SIGNIFICANT CHANGE UP (ref 27.5–35.5)
APTT BLD: 35.1 SEC — SIGNIFICANT CHANGE UP (ref 27.5–35.5)
APTT BLD: 35.4 SEC — SIGNIFICANT CHANGE UP (ref 27.5–35.5)
APTT BLD: 35.6 SEC — SIGNIFICANT CHANGE UP (ref 27–36.3)
APTT BLD: 36.5 SEC — HIGH (ref 27–36.3)
APTT BLD: 36.7 SEC — HIGH (ref 27.5–35.5)
APTT BLD: 40.7 SEC — HIGH (ref 27.5–35.5)
APTT BLD: 45 SEC — HIGH (ref 27.5–35.5)
APTT BLD: 48.2 SEC — HIGH (ref 27.5–35.5)
APTT BLD: 48.7 SEC — HIGH (ref 27.5–35.5)
APTT BLD: 49 SEC — HIGH (ref 27.5–35.5)
APTT BLD: 49.2 SEC — HIGH (ref 27.5–35.5)
APTT BLD: 49.4 SEC — HIGH (ref 27.5–35.5)
APTT BLD: 50.9 SEC — HIGH (ref 27.5–35.5)
APTT BLD: 51.8 SEC — HIGH (ref 27.5–35.5)
APTT BLD: 52.1 SEC — HIGH (ref 27.5–35.5)
APTT BLD: 53 SEC — HIGH (ref 27.5–35.5)
APTT BLD: 53.6 SEC — HIGH (ref 27.5–35.5)
APTT BLD: 57.5 SEC — HIGH (ref 27.5–35.5)
APTT BLD: 58.3 SEC — HIGH (ref 27.5–35.5)
APTT BLD: 59.1 SEC — HIGH (ref 27.5–35.5)
APTT BLD: 60 SEC — HIGH (ref 27.5–35.5)
APTT BLD: 60.3 SEC — HIGH (ref 27.5–35.5)
APTT BLD: 61.2 SEC — HIGH (ref 27.5–35.5)
APTT BLD: 61.5 SEC — HIGH (ref 27.5–35.5)
APTT BLD: 61.6 SEC — HIGH (ref 27.5–35.5)
APTT BLD: 63.7 SEC — HIGH (ref 27.5–35.5)
APTT BLD: 66.3 SEC — HIGH (ref 27.5–35.5)
APTT BLD: 66.4 SEC — HIGH (ref 27.5–35.5)
APTT BLD: 66.6 SEC — HIGH (ref 27.5–35.5)
APTT BLD: 67.3 SEC — HIGH (ref 27.5–35.5)
APTT BLD: 67.9 SEC — HIGH (ref 27.5–35.5)
APTT BLD: 71.2 SEC — HIGH (ref 27.5–35.5)
APTT BLD: 72.5 SEC — HIGH (ref 27.5–35.5)
APTT BLD: 72.9 SEC — HIGH (ref 27.5–35.5)
APTT BLD: 74.6 SEC — HIGH (ref 27.5–35.5)
APTT BLD: 74.7 SEC — HIGH (ref 27.5–35.5)
APTT BLD: 74.8 SEC — HIGH (ref 27.5–35.5)
APTT BLD: 75.9 SEC — HIGH (ref 27.5–35.5)
APTT BLD: 76.2 SEC — HIGH (ref 27.5–35.5)
APTT BLD: 76.8 SEC — HIGH (ref 27.5–35.5)
APTT BLD: 77.6 SEC — HIGH (ref 27.5–35.5)
APTT BLD: 79.9 SEC — HIGH (ref 27.5–35.5)
APTT BLD: 81.9 SEC — HIGH (ref 27.5–35.5)
APTT BLD: 82 SEC — HIGH (ref 27.5–35.5)
APTT BLD: 82 SEC — HIGH (ref 27.5–35.5)
APTT BLD: 82.1 SEC — HIGH (ref 27.5–35.5)
APTT BLD: 83.6 SEC — HIGH (ref 27.5–35.5)
APTT BLD: 84.4 SEC — HIGH (ref 27.5–35.5)
APTT BLD: 85.6 SEC — HIGH (ref 27.5–35.5)
APTT BLD: 85.8 SEC — HIGH (ref 27.5–35.5)
APTT BLD: 86.4 SEC — HIGH (ref 27.5–35.5)
APTT BLD: 88 SEC — HIGH (ref 27.5–35.5)
APTT BLD: 96.3 SEC — HIGH (ref 27.5–35.5)
AST SERPL-CCNC: 12 U/L — SIGNIFICANT CHANGE UP (ref 10–40)
AST SERPL-CCNC: 13 U/L — SIGNIFICANT CHANGE UP (ref 4–40)
AST SERPL-CCNC: 31 U/L — SIGNIFICANT CHANGE UP (ref 4–40)
AST SERPL-CCNC: 9 U/L — LOW (ref 10–40)
B PERT DNA SPEC QL NAA+PROBE: SIGNIFICANT CHANGE UP
B PERT IGG+IGM PNL SER: ABNORMAL
B PERT+PARAPERT DNA PNL SPEC NAA+PROBE: SIGNIFICANT CHANGE UP
BACTERIA # UR AUTO: ABNORMAL
BACTERIA # UR AUTO: NEGATIVE — SIGNIFICANT CHANGE UP
BASE EXCESS BLDA CALC-SCNC: 6.4 MMOL/L — HIGH (ref -2–3)
BASE EXCESS BLDA CALC-SCNC: 8.8 MMOL/L — HIGH (ref -2–3)
BASE EXCESS BLDV CALC-SCNC: -0.5 MMOL/L — SIGNIFICANT CHANGE UP (ref -2–2)
BASE EXCESS BLDV CALC-SCNC: 0.3 MMOL/L — SIGNIFICANT CHANGE UP (ref -2–2)
BASE EXCESS BLDV CALC-SCNC: 12.7 MMOL/L — HIGH (ref -2–3)
BASE EXCESS BLDV CALC-SCNC: 5 MMOL/L — HIGH (ref -2–2)
BASE EXCESS BLDV CALC-SCNC: 5.7 MMOL/L — HIGH (ref -2–2)
BASE EXCESS BLDV CALC-SCNC: 7.6 MMOL/L — HIGH (ref -2–3)
BASE EXCESS BLDV CALC-SCNC: 7.6 MMOL/L — HIGH (ref -2–3)
BASE EXCESS BLDV CALC-SCNC: 9 MMOL/L — HIGH (ref -2–3)
BASOPHILS # BLD AUTO: 0.02 K/UL — SIGNIFICANT CHANGE UP (ref 0–0.2)
BASOPHILS # BLD AUTO: 0.02 K/UL — SIGNIFICANT CHANGE UP (ref 0–0.2)
BASOPHILS # BLD AUTO: 0.03 K/UL — SIGNIFICANT CHANGE UP (ref 0–0.2)
BASOPHILS # BLD AUTO: 0.07 K/UL — SIGNIFICANT CHANGE UP (ref 0–0.2)
BASOPHILS # BLD AUTO: 0.07 K/UL — SIGNIFICANT CHANGE UP (ref 0–0.2)
BASOPHILS # BLD AUTO: 0.09 K/UL — SIGNIFICANT CHANGE UP (ref 0–0.2)
BASOPHILS NFR BLD AUTO: 0.1 % — SIGNIFICANT CHANGE UP (ref 0–2)
BASOPHILS NFR BLD AUTO: 0.3 % — SIGNIFICANT CHANGE UP (ref 0–2)
BASOPHILS NFR BLD AUTO: 0.4 % — SIGNIFICANT CHANGE UP (ref 0–2)
BASOPHILS NFR BLD AUTO: 0.8 % — SIGNIFICANT CHANGE UP (ref 0–2)
BASOPHILS NFR BLD AUTO: 0.9 % — SIGNIFICANT CHANGE UP (ref 0–2)
BASOPHILS NFR BLD AUTO: 1 % — SIGNIFICANT CHANGE UP (ref 0–2)
BILIRUB SERPL-MCNC: 0.3 MG/DL — SIGNIFICANT CHANGE UP (ref 0.2–1.2)
BILIRUB SERPL-MCNC: 0.5 MG/DL — SIGNIFICANT CHANGE UP (ref 0.2–1.2)
BILIRUB UR-MCNC: NEGATIVE — SIGNIFICANT CHANGE UP
BLD GP AB SCN SERPL QL: NEGATIVE — SIGNIFICANT CHANGE UP
BLOOD GAS VENOUS - CREATININE: SIGNIFICANT CHANGE UP MG/DL (ref 0.5–1.3)
BLOOD GAS VENOUS COMPREHENSIVE RESULT: SIGNIFICANT CHANGE UP
BORDETELLA PARAPERTUSSIS (RAPRVP): SIGNIFICANT CHANGE UP
BUN SERPL-MCNC: 100 MG/DL — HIGH (ref 7–23)
BUN SERPL-MCNC: 102 MG/DL — HIGH (ref 7–23)
BUN SERPL-MCNC: 103 MG/DL — HIGH (ref 7–23)
BUN SERPL-MCNC: 14 MG/DL — SIGNIFICANT CHANGE UP (ref 7–23)
BUN SERPL-MCNC: 15 MG/DL — SIGNIFICANT CHANGE UP (ref 7–23)
BUN SERPL-MCNC: 18 MG/DL — SIGNIFICANT CHANGE UP (ref 7–23)
BUN SERPL-MCNC: 19 MG/DL — SIGNIFICANT CHANGE UP (ref 7–23)
BUN SERPL-MCNC: 20 MG/DL — SIGNIFICANT CHANGE UP (ref 7–23)
BUN SERPL-MCNC: 21 MG/DL — SIGNIFICANT CHANGE UP (ref 7–23)
BUN SERPL-MCNC: 22 MG/DL — SIGNIFICANT CHANGE UP (ref 7–23)
BUN SERPL-MCNC: 22 MG/DL — SIGNIFICANT CHANGE UP (ref 7–23)
BUN SERPL-MCNC: 23 MG/DL — SIGNIFICANT CHANGE UP (ref 7–23)
BUN SERPL-MCNC: 24 MG/DL — HIGH (ref 7–23)
BUN SERPL-MCNC: 24 MG/DL — HIGH (ref 7–23)
BUN SERPL-MCNC: 26 MG/DL — HIGH (ref 7–23)
BUN SERPL-MCNC: 26 MG/DL — HIGH (ref 7–23)
BUN SERPL-MCNC: 27 MG/DL — HIGH (ref 7–23)
BUN SERPL-MCNC: 27 MG/DL — HIGH (ref 7–23)
BUN SERPL-MCNC: 29 MG/DL — HIGH (ref 7–23)
BUN SERPL-MCNC: 30 MG/DL — HIGH (ref 7–23)
BUN SERPL-MCNC: 31 MG/DL — HIGH (ref 7–23)
BUN SERPL-MCNC: 34 MG/DL — HIGH (ref 7–23)
BUN SERPL-MCNC: 35 MG/DL — HIGH (ref 7–23)
BUN SERPL-MCNC: 36 MG/DL — HIGH (ref 7–23)
BUN SERPL-MCNC: 37 MG/DL — HIGH (ref 7–23)
BUN SERPL-MCNC: 38 MG/DL — HIGH (ref 7–23)
BUN SERPL-MCNC: 39 MG/DL — HIGH (ref 7–23)
BUN SERPL-MCNC: 40 MG/DL — HIGH (ref 7–23)
BUN SERPL-MCNC: 40 MG/DL — HIGH (ref 7–23)
BUN SERPL-MCNC: 41 MG/DL — HIGH (ref 7–23)
BUN SERPL-MCNC: 41 MG/DL — HIGH (ref 7–23)
BUN SERPL-MCNC: 42 MG/DL — HIGH (ref 7–23)
BUN SERPL-MCNC: 43 MG/DL — HIGH (ref 7–23)
BUN SERPL-MCNC: 44 MG/DL — HIGH (ref 7–23)
BUN SERPL-MCNC: 45 MG/DL — HIGH (ref 7–23)
BUN SERPL-MCNC: 49 MG/DL — HIGH (ref 7–23)
BUN SERPL-MCNC: 49 MG/DL — HIGH (ref 7–23)
BUN SERPL-MCNC: 51 MG/DL — HIGH (ref 7–23)
BUN SERPL-MCNC: 53 MG/DL — HIGH (ref 7–23)
BUN SERPL-MCNC: 53 MG/DL — HIGH (ref 7–23)
BUN SERPL-MCNC: 56 MG/DL — HIGH (ref 7–23)
BUN SERPL-MCNC: 56 MG/DL — HIGH (ref 7–23)
BUN SERPL-MCNC: 60 MG/DL — HIGH (ref 7–23)
BUN SERPL-MCNC: 63 MG/DL — HIGH (ref 7–23)
BUN SERPL-MCNC: 64 MG/DL — HIGH (ref 7–23)
BUN SERPL-MCNC: 70 MG/DL — HIGH (ref 7–23)
BUN SERPL-MCNC: 71 MG/DL — HIGH (ref 7–23)
BUN SERPL-MCNC: 78 MG/DL — HIGH (ref 7–23)
BUN SERPL-MCNC: 78 MG/DL — HIGH (ref 7–23)
BUN SERPL-MCNC: 79 MG/DL — HIGH (ref 7–23)
BUN SERPL-MCNC: 80 MG/DL — HIGH (ref 7–23)
BUN SERPL-MCNC: 82 MG/DL — HIGH (ref 7–23)
BUN SERPL-MCNC: 84 MG/DL — HIGH (ref 7–23)
BUN SERPL-MCNC: 86 MG/DL — HIGH (ref 7–23)
BUN SERPL-MCNC: 88 MG/DL — HIGH (ref 7–23)
BUN SERPL-MCNC: 91 MG/DL — HIGH (ref 7–23)
BUN SERPL-MCNC: 94 MG/DL — HIGH (ref 7–23)
BUN SERPL-MCNC: 96 MG/DL — HIGH (ref 7–23)
BUN SERPL-MCNC: 98 MG/DL — HIGH (ref 7–23)
BUN SERPL-MCNC: 98 MG/DL — HIGH (ref 7–23)
BUN SERPL-MCNC: 99 MG/DL — HIGH (ref 7–23)
C PNEUM DNA SPEC QL NAA+PROBE: SIGNIFICANT CHANGE UP
CA-I SERPL-SCNC: 1.15 MMOL/L — SIGNIFICANT CHANGE UP (ref 1.15–1.33)
CA-I SERPL-SCNC: 1.18 MMOL/L — SIGNIFICANT CHANGE UP (ref 1.15–1.33)
CA-I SERPL-SCNC: 1.19 MMOL/L — SIGNIFICANT CHANGE UP (ref 1.15–1.33)
CA-I SERPL-SCNC: 1.2 MMOL/L — SIGNIFICANT CHANGE UP (ref 1.15–1.33)
CA-I SERPL-SCNC: 1.24 MMOL/L — SIGNIFICANT CHANGE UP (ref 1.15–1.33)
CA-I SERPL-SCNC: 1.26 MMOL/L — SIGNIFICANT CHANGE UP (ref 1.15–1.33)
CA-I SERPL-SCNC: 1.28 MMOL/L — SIGNIFICANT CHANGE UP (ref 1.15–1.33)
CALCIUM SERPL-MCNC: 7.5 MG/DL — LOW (ref 8.4–10.5)
CALCIUM SERPL-MCNC: 7.7 MG/DL — LOW (ref 8.4–10.5)
CALCIUM SERPL-MCNC: 7.8 MG/DL — LOW (ref 8.4–10.5)
CALCIUM SERPL-MCNC: 7.9 MG/DL — LOW (ref 8.4–10.5)
CALCIUM SERPL-MCNC: 8 MG/DL — LOW (ref 8.4–10.5)
CALCIUM SERPL-MCNC: 8 MG/DL — LOW (ref 8.4–10.5)
CALCIUM SERPL-MCNC: 8.1 MG/DL — LOW (ref 8.4–10.5)
CALCIUM SERPL-MCNC: 8.2 MG/DL — LOW (ref 8.4–10.5)
CALCIUM SERPL-MCNC: 8.3 MG/DL — LOW (ref 8.4–10.5)
CALCIUM SERPL-MCNC: 8.4 MG/DL — SIGNIFICANT CHANGE UP (ref 8.4–10.5)
CALCIUM SERPL-MCNC: 8.5 MG/DL — SIGNIFICANT CHANGE UP (ref 8.4–10.5)
CALCIUM SERPL-MCNC: 8.6 MG/DL — SIGNIFICANT CHANGE UP (ref 8.4–10.5)
CALCIUM SERPL-MCNC: 8.7 MG/DL — SIGNIFICANT CHANGE UP (ref 8.4–10.5)
CALCIUM SERPL-MCNC: 8.8 MG/DL — SIGNIFICANT CHANGE UP (ref 8.4–10.5)
CALCIUM SERPL-MCNC: 8.8 MG/DL — SIGNIFICANT CHANGE UP (ref 8.4–10.5)
CALCIUM SERPL-MCNC: 8.9 MG/DL — SIGNIFICANT CHANGE UP (ref 8.4–10.5)
CALCIUM SERPL-MCNC: 9 MG/DL — SIGNIFICANT CHANGE UP (ref 8.4–10.5)
CALCIUM SERPL-MCNC: 9.1 MG/DL — SIGNIFICANT CHANGE UP (ref 8.4–10.5)
CALCIUM SERPL-MCNC: 9.2 MG/DL — SIGNIFICANT CHANGE UP (ref 8.4–10.5)
CALCIUM SERPL-MCNC: 9.2 MG/DL — SIGNIFICANT CHANGE UP (ref 8.4–10.5)
CALCIUM SERPL-MCNC: 9.3 MG/DL — SIGNIFICANT CHANGE UP (ref 8.4–10.5)
CALCIUM SERPL-MCNC: 9.5 MG/DL — SIGNIFICANT CHANGE UP (ref 8.4–10.5)
CHLORIDE BLDV-SCNC: 102 MMOL/L — SIGNIFICANT CHANGE UP (ref 96–108)
CHLORIDE BLDV-SCNC: 103 MMOL/L — SIGNIFICANT CHANGE UP (ref 96–108)
CHLORIDE BLDV-SCNC: 104 MMOL/L — SIGNIFICANT CHANGE UP (ref 96–108)
CHLORIDE BLDV-SCNC: 106 MMOL/L — SIGNIFICANT CHANGE UP (ref 96–108)
CHLORIDE BLDV-SCNC: 107 MMOL/L — SIGNIFICANT CHANGE UP (ref 96–108)
CHLORIDE BLDV-SCNC: 109 MMOL/L — HIGH (ref 96–108)
CHLORIDE BLDV-SCNC: 112 MMOL/L — HIGH (ref 96–108)
CHLORIDE BLDV-SCNC: 112 MMOL/L — HIGH (ref 96–108)
CHLORIDE SERPL-SCNC: 100 MMOL/L — SIGNIFICANT CHANGE UP (ref 96–108)
CHLORIDE SERPL-SCNC: 100 MMOL/L — SIGNIFICANT CHANGE UP (ref 98–107)
CHLORIDE SERPL-SCNC: 101 MMOL/L — SIGNIFICANT CHANGE UP (ref 96–108)
CHLORIDE SERPL-SCNC: 101 MMOL/L — SIGNIFICANT CHANGE UP (ref 98–107)
CHLORIDE SERPL-SCNC: 101 MMOL/L — SIGNIFICANT CHANGE UP (ref 98–107)
CHLORIDE SERPL-SCNC: 102 MMOL/L — SIGNIFICANT CHANGE UP (ref 96–108)
CHLORIDE SERPL-SCNC: 102 MMOL/L — SIGNIFICANT CHANGE UP (ref 98–107)
CHLORIDE SERPL-SCNC: 102 MMOL/L — SIGNIFICANT CHANGE UP (ref 98–107)
CHLORIDE SERPL-SCNC: 103 MMOL/L — SIGNIFICANT CHANGE UP (ref 96–108)
CHLORIDE SERPL-SCNC: 103 MMOL/L — SIGNIFICANT CHANGE UP (ref 98–107)
CHLORIDE SERPL-SCNC: 103 MMOL/L — SIGNIFICANT CHANGE UP (ref 98–107)
CHLORIDE SERPL-SCNC: 104 MMOL/L — SIGNIFICANT CHANGE UP (ref 96–108)
CHLORIDE SERPL-SCNC: 105 MMOL/L — SIGNIFICANT CHANGE UP (ref 96–108)
CHLORIDE SERPL-SCNC: 105 MMOL/L — SIGNIFICANT CHANGE UP (ref 98–107)
CHLORIDE SERPL-SCNC: 106 MMOL/L — SIGNIFICANT CHANGE UP (ref 96–108)
CHLORIDE SERPL-SCNC: 106 MMOL/L — SIGNIFICANT CHANGE UP (ref 98–107)
CHLORIDE SERPL-SCNC: 107 MMOL/L — SIGNIFICANT CHANGE UP (ref 96–108)
CHLORIDE SERPL-SCNC: 109 MMOL/L — HIGH (ref 96–108)
CHLORIDE SERPL-SCNC: 109 MMOL/L — HIGH (ref 96–108)
CHLORIDE SERPL-SCNC: 109 MMOL/L — HIGH (ref 98–107)
CHLORIDE SERPL-SCNC: 110 MMOL/L — HIGH (ref 96–108)
CHLORIDE SERPL-SCNC: 110 MMOL/L — HIGH (ref 98–107)
CHLORIDE SERPL-SCNC: 111 MMOL/L — HIGH (ref 98–107)
CHLORIDE SERPL-SCNC: 112 MMOL/L — HIGH (ref 96–108)
CHLORIDE SERPL-SCNC: 112 MMOL/L — HIGH (ref 98–107)
CHLORIDE SERPL-SCNC: 112 MMOL/L — HIGH (ref 98–107)
CHLORIDE SERPL-SCNC: 96 MMOL/L — LOW (ref 98–107)
CHLORIDE SERPL-SCNC: 99 MMOL/L — SIGNIFICANT CHANGE UP (ref 96–108)
CHLORIDE SERPL-SCNC: 99 MMOL/L — SIGNIFICANT CHANGE UP (ref 98–107)
CHLORIDE UR-SCNC: 21 MMOL/L — SIGNIFICANT CHANGE UP
CHOLEST SERPL-MCNC: 91 MG/DL — SIGNIFICANT CHANGE UP
CK MB CFR SERPL CALC: 2.9 NG/ML — SIGNIFICANT CHANGE UP (ref 0–6.7)
CO2 BLDA-SCNC: 32 MMOL/L — HIGH (ref 19–24)
CO2 BLDA-SCNC: 34 MMOL/L — HIGH (ref 19–24)
CO2 BLDV-SCNC: 25 MMOL/L — SIGNIFICANT CHANGE UP (ref 22–26)
CO2 BLDV-SCNC: 27 MMOL/L — HIGH (ref 22–26)
CO2 BLDV-SCNC: 32 MMOL/L — HIGH (ref 22–26)
CO2 BLDV-SCNC: 33 MMOL/L — HIGH (ref 22–26)
CO2 BLDV-SCNC: 35 MMOL/L — HIGH (ref 22–26)
CO2 BLDV-SCNC: 35.9 MMOL/L — HIGH (ref 22–26)
CO2 BLDV-SCNC: 37.3 MMOL/L — HIGH (ref 22–26)
CO2 BLDV-SCNC: 41.3 MMOL/L — HIGH (ref 22–26)
CO2 SERPL-SCNC: 19 MMOL/L — LOW (ref 22–31)
CO2 SERPL-SCNC: 19 MMOL/L — LOW (ref 22–31)
CO2 SERPL-SCNC: 20 MMOL/L — LOW (ref 22–31)
CO2 SERPL-SCNC: 21 MMOL/L — LOW (ref 22–31)
CO2 SERPL-SCNC: 22 MMOL/L — SIGNIFICANT CHANGE UP (ref 22–31)
CO2 SERPL-SCNC: 23 MMOL/L — SIGNIFICANT CHANGE UP (ref 22–31)
CO2 SERPL-SCNC: 23 MMOL/L — SIGNIFICANT CHANGE UP (ref 22–31)
CO2 SERPL-SCNC: 24 MMOL/L — SIGNIFICANT CHANGE UP (ref 22–31)
CO2 SERPL-SCNC: 25 MMOL/L — SIGNIFICANT CHANGE UP (ref 22–31)
CO2 SERPL-SCNC: 25 MMOL/L — SIGNIFICANT CHANGE UP (ref 22–31)
CO2 SERPL-SCNC: 26 MMOL/L — SIGNIFICANT CHANGE UP (ref 22–31)
CO2 SERPL-SCNC: 27 MMOL/L — SIGNIFICANT CHANGE UP (ref 22–31)
CO2 SERPL-SCNC: 28 MMOL/L — SIGNIFICANT CHANGE UP (ref 22–31)
CO2 SERPL-SCNC: 29 MMOL/L — SIGNIFICANT CHANGE UP (ref 22–31)
CO2 SERPL-SCNC: 30 MMOL/L — SIGNIFICANT CHANGE UP (ref 22–31)
CO2 SERPL-SCNC: 31 MMOL/L — SIGNIFICANT CHANGE UP (ref 22–31)
CO2 SERPL-SCNC: 31 MMOL/L — SIGNIFICANT CHANGE UP (ref 22–31)
CO2 SERPL-SCNC: 32 MMOL/L — HIGH (ref 22–31)
CO2 SERPL-SCNC: 33 MMOL/L — HIGH (ref 22–31)
CO2 SERPL-SCNC: 33 MMOL/L — HIGH (ref 22–31)
CO2 SERPL-SCNC: 34 MMOL/L — HIGH (ref 22–31)
CO2 SERPL-SCNC: 35 MMOL/L — HIGH (ref 22–31)
CO2 SERPL-SCNC: 36 MMOL/L — HIGH (ref 22–31)
CO2 SERPL-SCNC: 37 MMOL/L — HIGH (ref 22–31)
CO2 SERPL-SCNC: 38 MMOL/L — HIGH (ref 22–31)
CO2 SERPL-SCNC: 39 MMOL/L — HIGH (ref 22–31)
COLOR FLD: YELLOW
COLOR SPEC: SIGNIFICANT CHANGE UP
COLOR SPEC: SIGNIFICANT CHANGE UP
COLOR SPEC: YELLOW — SIGNIFICANT CHANGE UP
COLOR SPEC: YELLOW — SIGNIFICANT CHANGE UP
COMMENT - FLUIDS: SIGNIFICANT CHANGE UP
CREAT ?TM UR-MCNC: 42 MG/DL — SIGNIFICANT CHANGE UP
CREAT SERPL-MCNC: 0.76 MG/DL — SIGNIFICANT CHANGE UP (ref 0.5–1.3)
CREAT SERPL-MCNC: 0.82 MG/DL — SIGNIFICANT CHANGE UP (ref 0.5–1.3)
CREAT SERPL-MCNC: 0.88 MG/DL — SIGNIFICANT CHANGE UP (ref 0.5–1.3)
CREAT SERPL-MCNC: 0.89 MG/DL — SIGNIFICANT CHANGE UP (ref 0.5–1.3)
CREAT SERPL-MCNC: 0.9 MG/DL — SIGNIFICANT CHANGE UP (ref 0.5–1.3)
CREAT SERPL-MCNC: 0.9 MG/DL — SIGNIFICANT CHANGE UP (ref 0.5–1.3)
CREAT SERPL-MCNC: 0.91 MG/DL — SIGNIFICANT CHANGE UP (ref 0.5–1.3)
CREAT SERPL-MCNC: 0.92 MG/DL — SIGNIFICANT CHANGE UP (ref 0.5–1.3)
CREAT SERPL-MCNC: 0.92 MG/DL — SIGNIFICANT CHANGE UP (ref 0.5–1.3)
CREAT SERPL-MCNC: 0.93 MG/DL — SIGNIFICANT CHANGE UP (ref 0.5–1.3)
CREAT SERPL-MCNC: 0.93 MG/DL — SIGNIFICANT CHANGE UP (ref 0.5–1.3)
CREAT SERPL-MCNC: 0.94 MG/DL — SIGNIFICANT CHANGE UP (ref 0.5–1.3)
CREAT SERPL-MCNC: 0.95 MG/DL — SIGNIFICANT CHANGE UP (ref 0.5–1.3)
CREAT SERPL-MCNC: 0.95 MG/DL — SIGNIFICANT CHANGE UP (ref 0.5–1.3)
CREAT SERPL-MCNC: 0.96 MG/DL — SIGNIFICANT CHANGE UP (ref 0.5–1.3)
CREAT SERPL-MCNC: 0.97 MG/DL — SIGNIFICANT CHANGE UP (ref 0.5–1.3)
CREAT SERPL-MCNC: 0.98 MG/DL — SIGNIFICANT CHANGE UP (ref 0.5–1.3)
CREAT SERPL-MCNC: 0.99 MG/DL — SIGNIFICANT CHANGE UP (ref 0.5–1.3)
CREAT SERPL-MCNC: 0.99 MG/DL — SIGNIFICANT CHANGE UP (ref 0.5–1.3)
CREAT SERPL-MCNC: 1 MG/DL — SIGNIFICANT CHANGE UP (ref 0.5–1.3)
CREAT SERPL-MCNC: 1.01 MG/DL — SIGNIFICANT CHANGE UP (ref 0.5–1.3)
CREAT SERPL-MCNC: 1.01 MG/DL — SIGNIFICANT CHANGE UP (ref 0.5–1.3)
CREAT SERPL-MCNC: 1.04 MG/DL — SIGNIFICANT CHANGE UP (ref 0.5–1.3)
CREAT SERPL-MCNC: 1.06 MG/DL — SIGNIFICANT CHANGE UP (ref 0.5–1.3)
CREAT SERPL-MCNC: 1.08 MG/DL — SIGNIFICANT CHANGE UP (ref 0.5–1.3)
CREAT SERPL-MCNC: 1.1 MG/DL — SIGNIFICANT CHANGE UP (ref 0.5–1.3)
CREAT SERPL-MCNC: 1.1 MG/DL — SIGNIFICANT CHANGE UP (ref 0.5–1.3)
CREAT SERPL-MCNC: 1.11 MG/DL — SIGNIFICANT CHANGE UP (ref 0.5–1.3)
CREAT SERPL-MCNC: 1.11 MG/DL — SIGNIFICANT CHANGE UP (ref 0.5–1.3)
CREAT SERPL-MCNC: 1.24 MG/DL — SIGNIFICANT CHANGE UP (ref 0.5–1.3)
CREAT SERPL-MCNC: 1.27 MG/DL — SIGNIFICANT CHANGE UP (ref 0.5–1.3)
CREAT SERPL-MCNC: 1.27 MG/DL — SIGNIFICANT CHANGE UP (ref 0.5–1.3)
CREAT SERPL-MCNC: 1.29 MG/DL — SIGNIFICANT CHANGE UP (ref 0.5–1.3)
CREAT SERPL-MCNC: 1.33 MG/DL — HIGH (ref 0.5–1.3)
CREAT SERPL-MCNC: 1.33 MG/DL — HIGH (ref 0.5–1.3)
CREAT SERPL-MCNC: 1.44 MG/DL — HIGH (ref 0.5–1.3)
CREAT SERPL-MCNC: 1.47 MG/DL — HIGH (ref 0.5–1.3)
CREAT SERPL-MCNC: 1.48 MG/DL — HIGH (ref 0.5–1.3)
CREAT SERPL-MCNC: 1.48 MG/DL — HIGH (ref 0.5–1.3)
CREAT SERPL-MCNC: 1.52 MG/DL — HIGH (ref 0.5–1.3)
CREAT SERPL-MCNC: 1.56 MG/DL — HIGH (ref 0.5–1.3)
CREAT SERPL-MCNC: 1.56 MG/DL — HIGH (ref 0.5–1.3)
CREAT SERPL-MCNC: 1.59 MG/DL — HIGH (ref 0.5–1.3)
CREAT SERPL-MCNC: 1.62 MG/DL — HIGH (ref 0.5–1.3)
CREAT SERPL-MCNC: 1.66 MG/DL — HIGH (ref 0.5–1.3)
CREAT SERPL-MCNC: 1.67 MG/DL — HIGH (ref 0.5–1.3)
CREAT SERPL-MCNC: 1.68 MG/DL — HIGH (ref 0.5–1.3)
CREAT SERPL-MCNC: 1.7 MG/DL — HIGH (ref 0.5–1.3)
CREAT SERPL-MCNC: 1.72 MG/DL — HIGH (ref 0.5–1.3)
CREAT SERPL-MCNC: 1.73 MG/DL — HIGH (ref 0.5–1.3)
CREAT SERPL-MCNC: 1.73 MG/DL — HIGH (ref 0.5–1.3)
CREAT SERPL-MCNC: 1.75 MG/DL — HIGH (ref 0.5–1.3)
CREAT SERPL-MCNC: 1.75 MG/DL — HIGH (ref 0.5–1.3)
CREAT SERPL-MCNC: 1.76 MG/DL — HIGH (ref 0.5–1.3)
CREAT SERPL-MCNC: 1.78 MG/DL — HIGH (ref 0.5–1.3)
CREAT SERPL-MCNC: 1.82 MG/DL — HIGH (ref 0.5–1.3)
CREAT SERPL-MCNC: 1.82 MG/DL — HIGH (ref 0.5–1.3)
CREAT SERPL-MCNC: 1.85 MG/DL — HIGH (ref 0.5–1.3)
CREAT SERPL-MCNC: 1.87 MG/DL — HIGH (ref 0.5–1.3)
CREAT SERPL-MCNC: 1.87 MG/DL — HIGH (ref 0.5–1.3)
CREAT SERPL-MCNC: 1.89 MG/DL — HIGH (ref 0.5–1.3)
CREAT SERPL-MCNC: 2.24 MG/DL — HIGH (ref 0.5–1.3)
CREAT SERPL-MCNC: 2.25 MG/DL — HIGH (ref 0.5–1.3)
CREAT SERPL-MCNC: 2.28 MG/DL — HIGH (ref 0.5–1.3)
CREAT SERPL-MCNC: 2.44 MG/DL — HIGH (ref 0.5–1.3)
CULTURE RESULTS: SIGNIFICANT CHANGE UP
D DIMER BLD IA.RAPID-MCNC: 399 NG/ML DDU — HIGH
DIFF PNL FLD: ABNORMAL
DIFF PNL FLD: NEGATIVE — SIGNIFICANT CHANGE UP
EGFR: 25 ML/MIN/1.73M2 — LOW
EGFR: 27 ML/MIN/1.73M2 — LOW
EGFR: 28 ML/MIN/1.73M2 — LOW
EGFR: 28 ML/MIN/1.73M2 — LOW
EGFR: 34 ML/MIN/1.73M2 — LOW
EGFR: 34 ML/MIN/1.73M2 — LOW
EGFR: 35 ML/MIN/1.73M2 — LOW
EGFR: 35 ML/MIN/1.73M2 — LOW
EGFR: 36 ML/MIN/1.73M2 — LOW
EGFR: 36 ML/MIN/1.73M2 — LOW
EGFR: 37 ML/MIN/1.73M2 — LOW
EGFR: 38 ML/MIN/1.73M2 — LOW
EGFR: 39 ML/MIN/1.73M2 — LOW
EGFR: 40 ML/MIN/1.73M2 — LOW
EGFR: 41 ML/MIN/1.73M2 — LOW
EGFR: 42 ML/MIN/1.73M2 — LOW
EGFR: 43 ML/MIN/1.73M2 — LOW
EGFR: 43 ML/MIN/1.73M2 — LOW
EGFR: 44 ML/MIN/1.73M2 — LOW
EGFR: 46 ML/MIN/1.73M2 — LOW
EGFR: 47 ML/MIN/1.73M2 — LOW
EGFR: 52 ML/MIN/1.73M2 — LOW
EGFR: 52 ML/MIN/1.73M2 — LOW
EGFR: 54 ML/MIN/1.73M2 — LOW
EGFR: 55 ML/MIN/1.73M2 — LOW
EGFR: 55 ML/MIN/1.73M2 — LOW
EGFR: 56 ML/MIN/1.73M2 — LOW
EGFR: 64 ML/MIN/1.73M2 — SIGNIFICANT CHANGE UP
EGFR: 64 ML/MIN/1.73M2 — SIGNIFICANT CHANGE UP
EGFR: 65 ML/MIN/1.73M2 — SIGNIFICANT CHANGE UP
EGFR: 65 ML/MIN/1.73M2 — SIGNIFICANT CHANGE UP
EGFR: 66 ML/MIN/1.73M2 — SIGNIFICANT CHANGE UP
EGFR: 68 ML/MIN/1.73M2 — SIGNIFICANT CHANGE UP
EGFR: 69 ML/MIN/1.73M2 — SIGNIFICANT CHANGE UP
EGFR: 72 ML/MIN/1.73M2 — SIGNIFICANT CHANGE UP
EGFR: 72 ML/MIN/1.73M2 — SIGNIFICANT CHANGE UP
EGFR: 73 ML/MIN/1.73M2 — SIGNIFICANT CHANGE UP
EGFR: 74 ML/MIN/1.73M2 — SIGNIFICANT CHANGE UP
EGFR: 74 ML/MIN/1.73M2 — SIGNIFICANT CHANGE UP
EGFR: 75 ML/MIN/1.73M2 — SIGNIFICANT CHANGE UP
EGFR: 76 ML/MIN/1.73M2 — SIGNIFICANT CHANGE UP
EGFR: 76 ML/MIN/1.73M2 — SIGNIFICANT CHANGE UP
EGFR: 77 ML/MIN/1.73M2 — SIGNIFICANT CHANGE UP
EGFR: 77 ML/MIN/1.73M2 — SIGNIFICANT CHANGE UP
EGFR: 78 ML/MIN/1.73M2 — SIGNIFICANT CHANGE UP
EGFR: 79 ML/MIN/1.73M2 — SIGNIFICANT CHANGE UP
EGFR: 79 ML/MIN/1.73M2 — SIGNIFICANT CHANGE UP
EGFR: 81 ML/MIN/1.73M2 — SIGNIFICANT CHANGE UP
EGFR: 81 ML/MIN/1.73M2 — SIGNIFICANT CHANGE UP
EGFR: 82 ML/MIN/1.73M2 — SIGNIFICANT CHANGE UP
EGFR: 83 ML/MIN/1.73M2 — SIGNIFICANT CHANGE UP
EGFR: 85 ML/MIN/1.73M2 — SIGNIFICANT CHANGE UP
EGFR: 87 ML/MIN/1.73M2 — SIGNIFICANT CHANGE UP
EOSINOPHIL # BLD AUTO: 0 K/UL — SIGNIFICANT CHANGE UP (ref 0–0.5)
EOSINOPHIL # BLD AUTO: 0.09 K/UL — SIGNIFICANT CHANGE UP (ref 0–0.5)
EOSINOPHIL # BLD AUTO: 0.09 K/UL — SIGNIFICANT CHANGE UP (ref 0–0.5)
EOSINOPHIL # BLD AUTO: 0.17 K/UL — SIGNIFICANT CHANGE UP (ref 0–0.5)
EOSINOPHIL # BLD AUTO: 0.26 K/UL — SIGNIFICANT CHANGE UP (ref 0–0.5)
EOSINOPHIL # BLD AUTO: 0.41 K/UL — SIGNIFICANT CHANGE UP (ref 0–0.5)
EOSINOPHIL NFR BLD AUTO: 0 % — SIGNIFICANT CHANGE UP (ref 0–6)
EOSINOPHIL NFR BLD AUTO: 0.5 % — SIGNIFICANT CHANGE UP (ref 0–6)
EOSINOPHIL NFR BLD AUTO: 1 % — SIGNIFICANT CHANGE UP (ref 0–6)
EOSINOPHIL NFR BLD AUTO: 2 % — SIGNIFICANT CHANGE UP (ref 0–6)
EOSINOPHIL NFR BLD AUTO: 3.5 % — SIGNIFICANT CHANGE UP (ref 0–6)
EOSINOPHIL NFR BLD AUTO: 4.4 % — SIGNIFICANT CHANGE UP (ref 0–6)
EPI CELLS # UR: 0 /HPF — SIGNIFICANT CHANGE UP
EPI CELLS # UR: 1 /HPF — SIGNIFICANT CHANGE UP (ref 0–5)
ESTIMATED AVERAGE GLUCOSE: 148 MG/DL — HIGH (ref 68–114)
ESTIMATED AVERAGE GLUCOSE: 214 MG/DL — HIGH (ref 68–114)
FERRITIN SERPL-MCNC: 44 NG/ML — SIGNIFICANT CHANGE UP (ref 30–400)
FLUAV SUBTYP SPEC NAA+PROBE: SIGNIFICANT CHANGE UP
FLUBV RNA SPEC QL NAA+PROBE: SIGNIFICANT CHANGE UP
FLUID INTAKE SUBSTANCE CLASS: SIGNIFICANT CHANGE UP
GAS PNL BLDA: SIGNIFICANT CHANGE UP
GAS PNL BLDV: 137 MMOL/L — SIGNIFICANT CHANGE UP (ref 136–145)
GAS PNL BLDV: 139 MMOL/L — SIGNIFICANT CHANGE UP (ref 136–145)
GAS PNL BLDV: 139 MMOL/L — SIGNIFICANT CHANGE UP (ref 136–145)
GAS PNL BLDV: 142 MMOL/L — SIGNIFICANT CHANGE UP (ref 136–145)
GAS PNL BLDV: 150 MMOL/L — HIGH (ref 136–145)
GAS PNL BLDV: 151 MMOL/L — HIGH (ref 136–145)
GAS PNL BLDV: SIGNIFICANT CHANGE UP
GIANT PLATELETS BLD QL SMEAR: PRESENT — SIGNIFICANT CHANGE UP
GLUCOSE BLDC GLUCOMTR-MCNC: 101 MG/DL — HIGH (ref 70–99)
GLUCOSE BLDC GLUCOMTR-MCNC: 102 MG/DL — HIGH (ref 70–99)
GLUCOSE BLDC GLUCOMTR-MCNC: 102 MG/DL — HIGH (ref 70–99)
GLUCOSE BLDC GLUCOMTR-MCNC: 103 MG/DL — HIGH (ref 70–99)
GLUCOSE BLDC GLUCOMTR-MCNC: 103 MG/DL — HIGH (ref 70–99)
GLUCOSE BLDC GLUCOMTR-MCNC: 107 MG/DL — HIGH (ref 70–99)
GLUCOSE BLDC GLUCOMTR-MCNC: 108 MG/DL — HIGH (ref 70–99)
GLUCOSE BLDC GLUCOMTR-MCNC: 109 MG/DL — HIGH (ref 70–99)
GLUCOSE BLDC GLUCOMTR-MCNC: 109 MG/DL — HIGH (ref 70–99)
GLUCOSE BLDC GLUCOMTR-MCNC: 110 MG/DL — HIGH (ref 70–99)
GLUCOSE BLDC GLUCOMTR-MCNC: 111 MG/DL — HIGH (ref 70–99)
GLUCOSE BLDC GLUCOMTR-MCNC: 111 MG/DL — HIGH (ref 70–99)
GLUCOSE BLDC GLUCOMTR-MCNC: 112 MG/DL — HIGH (ref 70–99)
GLUCOSE BLDC GLUCOMTR-MCNC: 113 MG/DL — HIGH (ref 70–99)
GLUCOSE BLDC GLUCOMTR-MCNC: 113 MG/DL — HIGH (ref 70–99)
GLUCOSE BLDC GLUCOMTR-MCNC: 114 MG/DL — HIGH (ref 70–99)
GLUCOSE BLDC GLUCOMTR-MCNC: 115 MG/DL — HIGH (ref 70–99)
GLUCOSE BLDC GLUCOMTR-MCNC: 115 MG/DL — HIGH (ref 70–99)
GLUCOSE BLDC GLUCOMTR-MCNC: 116 MG/DL — HIGH (ref 70–99)
GLUCOSE BLDC GLUCOMTR-MCNC: 117 MG/DL — HIGH (ref 70–99)
GLUCOSE BLDC GLUCOMTR-MCNC: 117 MG/DL — HIGH (ref 70–99)
GLUCOSE BLDC GLUCOMTR-MCNC: 118 MG/DL — HIGH (ref 70–99)
GLUCOSE BLDC GLUCOMTR-MCNC: 119 MG/DL — HIGH (ref 70–99)
GLUCOSE BLDC GLUCOMTR-MCNC: 120 MG/DL — HIGH (ref 70–99)
GLUCOSE BLDC GLUCOMTR-MCNC: 121 MG/DL — HIGH (ref 70–99)
GLUCOSE BLDC GLUCOMTR-MCNC: 122 MG/DL — HIGH (ref 70–99)
GLUCOSE BLDC GLUCOMTR-MCNC: 123 MG/DL — HIGH (ref 70–99)
GLUCOSE BLDC GLUCOMTR-MCNC: 125 MG/DL — HIGH (ref 70–99)
GLUCOSE BLDC GLUCOMTR-MCNC: 125 MG/DL — HIGH (ref 70–99)
GLUCOSE BLDC GLUCOMTR-MCNC: 126 MG/DL — HIGH (ref 70–99)
GLUCOSE BLDC GLUCOMTR-MCNC: 127 MG/DL — HIGH (ref 70–99)
GLUCOSE BLDC GLUCOMTR-MCNC: 128 MG/DL — HIGH (ref 70–99)
GLUCOSE BLDC GLUCOMTR-MCNC: 129 MG/DL — HIGH (ref 70–99)
GLUCOSE BLDC GLUCOMTR-MCNC: 130 MG/DL — HIGH (ref 70–99)
GLUCOSE BLDC GLUCOMTR-MCNC: 131 MG/DL — HIGH (ref 70–99)
GLUCOSE BLDC GLUCOMTR-MCNC: 132 MG/DL — HIGH (ref 70–99)
GLUCOSE BLDC GLUCOMTR-MCNC: 133 MG/DL — HIGH (ref 70–99)
GLUCOSE BLDC GLUCOMTR-MCNC: 134 MG/DL — HIGH (ref 70–99)
GLUCOSE BLDC GLUCOMTR-MCNC: 135 MG/DL — HIGH (ref 70–99)
GLUCOSE BLDC GLUCOMTR-MCNC: 136 MG/DL — HIGH (ref 70–99)
GLUCOSE BLDC GLUCOMTR-MCNC: 138 MG/DL — HIGH (ref 70–99)
GLUCOSE BLDC GLUCOMTR-MCNC: 139 MG/DL — HIGH (ref 70–99)
GLUCOSE BLDC GLUCOMTR-MCNC: 140 MG/DL — HIGH (ref 70–99)
GLUCOSE BLDC GLUCOMTR-MCNC: 141 MG/DL — HIGH (ref 70–99)
GLUCOSE BLDC GLUCOMTR-MCNC: 141 MG/DL — HIGH (ref 70–99)
GLUCOSE BLDC GLUCOMTR-MCNC: 142 MG/DL — HIGH (ref 70–99)
GLUCOSE BLDC GLUCOMTR-MCNC: 142 MG/DL — HIGH (ref 70–99)
GLUCOSE BLDC GLUCOMTR-MCNC: 143 MG/DL — HIGH (ref 70–99)
GLUCOSE BLDC GLUCOMTR-MCNC: 144 MG/DL — HIGH (ref 70–99)
GLUCOSE BLDC GLUCOMTR-MCNC: 145 MG/DL — HIGH (ref 70–99)
GLUCOSE BLDC GLUCOMTR-MCNC: 146 MG/DL — HIGH (ref 70–99)
GLUCOSE BLDC GLUCOMTR-MCNC: 147 MG/DL — HIGH (ref 70–99)
GLUCOSE BLDC GLUCOMTR-MCNC: 148 MG/DL — HIGH (ref 70–99)
GLUCOSE BLDC GLUCOMTR-MCNC: 149 MG/DL — HIGH (ref 70–99)
GLUCOSE BLDC GLUCOMTR-MCNC: 149 MG/DL — HIGH (ref 70–99)
GLUCOSE BLDC GLUCOMTR-MCNC: 150 MG/DL — HIGH (ref 70–99)
GLUCOSE BLDC GLUCOMTR-MCNC: 151 MG/DL — HIGH (ref 70–99)
GLUCOSE BLDC GLUCOMTR-MCNC: 151 MG/DL — HIGH (ref 70–99)
GLUCOSE BLDC GLUCOMTR-MCNC: 152 MG/DL — HIGH (ref 70–99)
GLUCOSE BLDC GLUCOMTR-MCNC: 153 MG/DL — HIGH (ref 70–99)
GLUCOSE BLDC GLUCOMTR-MCNC: 154 MG/DL — HIGH (ref 70–99)
GLUCOSE BLDC GLUCOMTR-MCNC: 155 MG/DL — HIGH (ref 70–99)
GLUCOSE BLDC GLUCOMTR-MCNC: 156 MG/DL — HIGH (ref 70–99)
GLUCOSE BLDC GLUCOMTR-MCNC: 157 MG/DL — HIGH (ref 70–99)
GLUCOSE BLDC GLUCOMTR-MCNC: 158 MG/DL — HIGH (ref 70–99)
GLUCOSE BLDC GLUCOMTR-MCNC: 159 MG/DL — HIGH (ref 70–99)
GLUCOSE BLDC GLUCOMTR-MCNC: 160 MG/DL — HIGH (ref 70–99)
GLUCOSE BLDC GLUCOMTR-MCNC: 162 MG/DL — HIGH (ref 70–99)
GLUCOSE BLDC GLUCOMTR-MCNC: 162 MG/DL — HIGH (ref 70–99)
GLUCOSE BLDC GLUCOMTR-MCNC: 164 MG/DL — HIGH (ref 70–99)
GLUCOSE BLDC GLUCOMTR-MCNC: 165 MG/DL — HIGH (ref 70–99)
GLUCOSE BLDC GLUCOMTR-MCNC: 166 MG/DL — HIGH (ref 70–99)
GLUCOSE BLDC GLUCOMTR-MCNC: 167 MG/DL — HIGH (ref 70–99)
GLUCOSE BLDC GLUCOMTR-MCNC: 168 MG/DL — HIGH (ref 70–99)
GLUCOSE BLDC GLUCOMTR-MCNC: 169 MG/DL — HIGH (ref 70–99)
GLUCOSE BLDC GLUCOMTR-MCNC: 170 MG/DL — HIGH (ref 70–99)
GLUCOSE BLDC GLUCOMTR-MCNC: 170 MG/DL — HIGH (ref 70–99)
GLUCOSE BLDC GLUCOMTR-MCNC: 171 MG/DL — HIGH (ref 70–99)
GLUCOSE BLDC GLUCOMTR-MCNC: 172 MG/DL — HIGH (ref 70–99)
GLUCOSE BLDC GLUCOMTR-MCNC: 172 MG/DL — HIGH (ref 70–99)
GLUCOSE BLDC GLUCOMTR-MCNC: 173 MG/DL — HIGH (ref 70–99)
GLUCOSE BLDC GLUCOMTR-MCNC: 174 MG/DL — HIGH (ref 70–99)
GLUCOSE BLDC GLUCOMTR-MCNC: 175 MG/DL — HIGH (ref 70–99)
GLUCOSE BLDC GLUCOMTR-MCNC: 175 MG/DL — HIGH (ref 70–99)
GLUCOSE BLDC GLUCOMTR-MCNC: 176 MG/DL — HIGH (ref 70–99)
GLUCOSE BLDC GLUCOMTR-MCNC: 176 MG/DL — HIGH (ref 70–99)
GLUCOSE BLDC GLUCOMTR-MCNC: 177 MG/DL — HIGH (ref 70–99)
GLUCOSE BLDC GLUCOMTR-MCNC: 178 MG/DL — HIGH (ref 70–99)
GLUCOSE BLDC GLUCOMTR-MCNC: 179 MG/DL — HIGH (ref 70–99)
GLUCOSE BLDC GLUCOMTR-MCNC: 180 MG/DL — HIGH (ref 70–99)
GLUCOSE BLDC GLUCOMTR-MCNC: 181 MG/DL — HIGH (ref 70–99)
GLUCOSE BLDC GLUCOMTR-MCNC: 182 MG/DL — HIGH (ref 70–99)
GLUCOSE BLDC GLUCOMTR-MCNC: 183 MG/DL — HIGH (ref 70–99)
GLUCOSE BLDC GLUCOMTR-MCNC: 185 MG/DL — HIGH (ref 70–99)
GLUCOSE BLDC GLUCOMTR-MCNC: 186 MG/DL — HIGH (ref 70–99)
GLUCOSE BLDC GLUCOMTR-MCNC: 187 MG/DL — HIGH (ref 70–99)
GLUCOSE BLDC GLUCOMTR-MCNC: 190 MG/DL — HIGH (ref 70–99)
GLUCOSE BLDC GLUCOMTR-MCNC: 191 MG/DL — HIGH (ref 70–99)
GLUCOSE BLDC GLUCOMTR-MCNC: 193 MG/DL — HIGH (ref 70–99)
GLUCOSE BLDC GLUCOMTR-MCNC: 195 MG/DL — HIGH (ref 70–99)
GLUCOSE BLDC GLUCOMTR-MCNC: 195 MG/DL — HIGH (ref 70–99)
GLUCOSE BLDC GLUCOMTR-MCNC: 196 MG/DL — HIGH (ref 70–99)
GLUCOSE BLDC GLUCOMTR-MCNC: 197 MG/DL — HIGH (ref 70–99)
GLUCOSE BLDC GLUCOMTR-MCNC: 199 MG/DL — HIGH (ref 70–99)
GLUCOSE BLDC GLUCOMTR-MCNC: 200 MG/DL — HIGH (ref 70–99)
GLUCOSE BLDC GLUCOMTR-MCNC: 201 MG/DL — HIGH (ref 70–99)
GLUCOSE BLDC GLUCOMTR-MCNC: 201 MG/DL — HIGH (ref 70–99)
GLUCOSE BLDC GLUCOMTR-MCNC: 202 MG/DL — HIGH (ref 70–99)
GLUCOSE BLDC GLUCOMTR-MCNC: 204 MG/DL — HIGH (ref 70–99)
GLUCOSE BLDC GLUCOMTR-MCNC: 205 MG/DL — HIGH (ref 70–99)
GLUCOSE BLDC GLUCOMTR-MCNC: 206 MG/DL — HIGH (ref 70–99)
GLUCOSE BLDC GLUCOMTR-MCNC: 206 MG/DL — HIGH (ref 70–99)
GLUCOSE BLDC GLUCOMTR-MCNC: 207 MG/DL — HIGH (ref 70–99)
GLUCOSE BLDC GLUCOMTR-MCNC: 207 MG/DL — HIGH (ref 70–99)
GLUCOSE BLDC GLUCOMTR-MCNC: 208 MG/DL — HIGH (ref 70–99)
GLUCOSE BLDC GLUCOMTR-MCNC: 211 MG/DL — HIGH (ref 70–99)
GLUCOSE BLDC GLUCOMTR-MCNC: 212 MG/DL — HIGH (ref 70–99)
GLUCOSE BLDC GLUCOMTR-MCNC: 212 MG/DL — HIGH (ref 70–99)
GLUCOSE BLDC GLUCOMTR-MCNC: 213 MG/DL — HIGH (ref 70–99)
GLUCOSE BLDC GLUCOMTR-MCNC: 214 MG/DL — HIGH (ref 70–99)
GLUCOSE BLDC GLUCOMTR-MCNC: 215 MG/DL — HIGH (ref 70–99)
GLUCOSE BLDC GLUCOMTR-MCNC: 217 MG/DL — HIGH (ref 70–99)
GLUCOSE BLDC GLUCOMTR-MCNC: 217 MG/DL — HIGH (ref 70–99)
GLUCOSE BLDC GLUCOMTR-MCNC: 220 MG/DL — HIGH (ref 70–99)
GLUCOSE BLDC GLUCOMTR-MCNC: 220 MG/DL — HIGH (ref 70–99)
GLUCOSE BLDC GLUCOMTR-MCNC: 229 MG/DL — HIGH (ref 70–99)
GLUCOSE BLDC GLUCOMTR-MCNC: 230 MG/DL — HIGH (ref 70–99)
GLUCOSE BLDC GLUCOMTR-MCNC: 230 MG/DL — HIGH (ref 70–99)
GLUCOSE BLDC GLUCOMTR-MCNC: 231 MG/DL — HIGH (ref 70–99)
GLUCOSE BLDC GLUCOMTR-MCNC: 231 MG/DL — HIGH (ref 70–99)
GLUCOSE BLDC GLUCOMTR-MCNC: 232 MG/DL — HIGH (ref 70–99)
GLUCOSE BLDC GLUCOMTR-MCNC: 234 MG/DL — HIGH (ref 70–99)
GLUCOSE BLDC GLUCOMTR-MCNC: 234 MG/DL — HIGH (ref 70–99)
GLUCOSE BLDC GLUCOMTR-MCNC: 235 MG/DL — HIGH (ref 70–99)
GLUCOSE BLDC GLUCOMTR-MCNC: 235 MG/DL — HIGH (ref 70–99)
GLUCOSE BLDC GLUCOMTR-MCNC: 237 MG/DL — HIGH (ref 70–99)
GLUCOSE BLDC GLUCOMTR-MCNC: 237 MG/DL — HIGH (ref 70–99)
GLUCOSE BLDC GLUCOMTR-MCNC: 238 MG/DL — HIGH (ref 70–99)
GLUCOSE BLDC GLUCOMTR-MCNC: 240 MG/DL — HIGH (ref 70–99)
GLUCOSE BLDC GLUCOMTR-MCNC: 242 MG/DL — HIGH (ref 70–99)
GLUCOSE BLDC GLUCOMTR-MCNC: 242 MG/DL — HIGH (ref 70–99)
GLUCOSE BLDC GLUCOMTR-MCNC: 247 MG/DL — HIGH (ref 70–99)
GLUCOSE BLDC GLUCOMTR-MCNC: 247 MG/DL — HIGH (ref 70–99)
GLUCOSE BLDC GLUCOMTR-MCNC: 251 MG/DL — HIGH (ref 70–99)
GLUCOSE BLDC GLUCOMTR-MCNC: 251 MG/DL — HIGH (ref 70–99)
GLUCOSE BLDC GLUCOMTR-MCNC: 252 MG/DL — HIGH (ref 70–99)
GLUCOSE BLDC GLUCOMTR-MCNC: 254 MG/DL — HIGH (ref 70–99)
GLUCOSE BLDC GLUCOMTR-MCNC: 256 MG/DL — HIGH (ref 70–99)
GLUCOSE BLDC GLUCOMTR-MCNC: 258 MG/DL — HIGH (ref 70–99)
GLUCOSE BLDC GLUCOMTR-MCNC: 258 MG/DL — HIGH (ref 70–99)
GLUCOSE BLDC GLUCOMTR-MCNC: 259 MG/DL — HIGH (ref 70–99)
GLUCOSE BLDC GLUCOMTR-MCNC: 262 MG/DL — HIGH (ref 70–99)
GLUCOSE BLDC GLUCOMTR-MCNC: 266 MG/DL — HIGH (ref 70–99)
GLUCOSE BLDC GLUCOMTR-MCNC: 267 MG/DL — HIGH (ref 70–99)
GLUCOSE BLDC GLUCOMTR-MCNC: 268 MG/DL — HIGH (ref 70–99)
GLUCOSE BLDC GLUCOMTR-MCNC: 270 MG/DL — HIGH (ref 70–99)
GLUCOSE BLDC GLUCOMTR-MCNC: 271 MG/DL — HIGH (ref 70–99)
GLUCOSE BLDC GLUCOMTR-MCNC: 278 MG/DL — HIGH (ref 70–99)
GLUCOSE BLDC GLUCOMTR-MCNC: 282 MG/DL — HIGH (ref 70–99)
GLUCOSE BLDC GLUCOMTR-MCNC: 283 MG/DL — HIGH (ref 70–99)
GLUCOSE BLDC GLUCOMTR-MCNC: 285 MG/DL — HIGH (ref 70–99)
GLUCOSE BLDC GLUCOMTR-MCNC: 286 MG/DL — HIGH (ref 70–99)
GLUCOSE BLDC GLUCOMTR-MCNC: 288 MG/DL — HIGH (ref 70–99)
GLUCOSE BLDC GLUCOMTR-MCNC: 293 MG/DL — HIGH (ref 70–99)
GLUCOSE BLDC GLUCOMTR-MCNC: 298 MG/DL — HIGH (ref 70–99)
GLUCOSE BLDC GLUCOMTR-MCNC: 299 MG/DL — HIGH (ref 70–99)
GLUCOSE BLDC GLUCOMTR-MCNC: 299 MG/DL — HIGH (ref 70–99)
GLUCOSE BLDC GLUCOMTR-MCNC: 301 MG/DL — HIGH (ref 70–99)
GLUCOSE BLDC GLUCOMTR-MCNC: 302 MG/DL — HIGH (ref 70–99)
GLUCOSE BLDC GLUCOMTR-MCNC: 305 MG/DL — HIGH (ref 70–99)
GLUCOSE BLDC GLUCOMTR-MCNC: 308 MG/DL — HIGH (ref 70–99)
GLUCOSE BLDC GLUCOMTR-MCNC: 309 MG/DL — HIGH (ref 70–99)
GLUCOSE BLDC GLUCOMTR-MCNC: 311 MG/DL — HIGH (ref 70–99)
GLUCOSE BLDC GLUCOMTR-MCNC: 319 MG/DL — HIGH (ref 70–99)
GLUCOSE BLDC GLUCOMTR-MCNC: 319 MG/DL — HIGH (ref 70–99)
GLUCOSE BLDC GLUCOMTR-MCNC: 327 MG/DL — HIGH (ref 70–99)
GLUCOSE BLDC GLUCOMTR-MCNC: 328 MG/DL — HIGH (ref 70–99)
GLUCOSE BLDC GLUCOMTR-MCNC: 328 MG/DL — HIGH (ref 70–99)
GLUCOSE BLDC GLUCOMTR-MCNC: 330 MG/DL — HIGH (ref 70–99)
GLUCOSE BLDC GLUCOMTR-MCNC: 331 MG/DL — HIGH (ref 70–99)
GLUCOSE BLDC GLUCOMTR-MCNC: 337 MG/DL — HIGH (ref 70–99)
GLUCOSE BLDC GLUCOMTR-MCNC: 339 MG/DL — HIGH (ref 70–99)
GLUCOSE BLDC GLUCOMTR-MCNC: 342 MG/DL — HIGH (ref 70–99)
GLUCOSE BLDC GLUCOMTR-MCNC: 343 MG/DL — HIGH (ref 70–99)
GLUCOSE BLDC GLUCOMTR-MCNC: 343 MG/DL — HIGH (ref 70–99)
GLUCOSE BLDC GLUCOMTR-MCNC: 349 MG/DL — HIGH (ref 70–99)
GLUCOSE BLDC GLUCOMTR-MCNC: 358 MG/DL — HIGH (ref 70–99)
GLUCOSE BLDC GLUCOMTR-MCNC: 361 MG/DL — HIGH (ref 70–99)
GLUCOSE BLDC GLUCOMTR-MCNC: 364 MG/DL — HIGH (ref 70–99)
GLUCOSE BLDC GLUCOMTR-MCNC: 366 MG/DL — HIGH (ref 70–99)
GLUCOSE BLDC GLUCOMTR-MCNC: 366 MG/DL — HIGH (ref 70–99)
GLUCOSE BLDC GLUCOMTR-MCNC: 371 MG/DL — HIGH (ref 70–99)
GLUCOSE BLDC GLUCOMTR-MCNC: 381 MG/DL — HIGH (ref 70–99)
GLUCOSE BLDC GLUCOMTR-MCNC: 385 MG/DL — HIGH (ref 70–99)
GLUCOSE BLDC GLUCOMTR-MCNC: 387 MG/DL — HIGH (ref 70–99)
GLUCOSE BLDC GLUCOMTR-MCNC: 392 MG/DL — HIGH (ref 70–99)
GLUCOSE BLDC GLUCOMTR-MCNC: 393 MG/DL — HIGH (ref 70–99)
GLUCOSE BLDC GLUCOMTR-MCNC: 409 MG/DL — HIGH (ref 70–99)
GLUCOSE BLDC GLUCOMTR-MCNC: 427 MG/DL — HIGH (ref 70–99)
GLUCOSE BLDC GLUCOMTR-MCNC: 442 MG/DL — HIGH (ref 70–99)
GLUCOSE BLDC GLUCOMTR-MCNC: 462 MG/DL — CRITICAL HIGH (ref 70–99)
GLUCOSE BLDC GLUCOMTR-MCNC: 53 MG/DL — CRITICAL LOW (ref 70–99)
GLUCOSE BLDC GLUCOMTR-MCNC: 53 MG/DL — CRITICAL LOW (ref 70–99)
GLUCOSE BLDC GLUCOMTR-MCNC: 54 MG/DL — CRITICAL LOW (ref 70–99)
GLUCOSE BLDC GLUCOMTR-MCNC: 56 MG/DL — LOW (ref 70–99)
GLUCOSE BLDC GLUCOMTR-MCNC: 57 MG/DL — LOW (ref 70–99)
GLUCOSE BLDC GLUCOMTR-MCNC: 73 MG/DL — SIGNIFICANT CHANGE UP (ref 70–99)
GLUCOSE BLDC GLUCOMTR-MCNC: 73 MG/DL — SIGNIFICANT CHANGE UP (ref 70–99)
GLUCOSE BLDC GLUCOMTR-MCNC: 85 MG/DL — SIGNIFICANT CHANGE UP (ref 70–99)
GLUCOSE BLDC GLUCOMTR-MCNC: 87 MG/DL — SIGNIFICANT CHANGE UP (ref 70–99)
GLUCOSE BLDC GLUCOMTR-MCNC: 93 MG/DL — SIGNIFICANT CHANGE UP (ref 70–99)
GLUCOSE BLDC GLUCOMTR-MCNC: 94 MG/DL — SIGNIFICANT CHANGE UP (ref 70–99)
GLUCOSE BLDC GLUCOMTR-MCNC: 96 MG/DL — SIGNIFICANT CHANGE UP (ref 70–99)
GLUCOSE BLDC GLUCOMTR-MCNC: 97 MG/DL — SIGNIFICANT CHANGE UP (ref 70–99)
GLUCOSE BLDV-MCNC: 126 MG/DL — HIGH (ref 70–99)
GLUCOSE BLDV-MCNC: 144 MG/DL — HIGH (ref 70–99)
GLUCOSE BLDV-MCNC: 162 MG/DL — HIGH (ref 70–99)
GLUCOSE BLDV-MCNC: 174 MG/DL — HIGH (ref 70–99)
GLUCOSE BLDV-MCNC: 181 MG/DL — HIGH (ref 70–99)
GLUCOSE BLDV-MCNC: 333 MG/DL — HIGH (ref 70–99)
GLUCOSE BLDV-MCNC: 63 MG/DL — LOW (ref 70–99)
GLUCOSE BLDV-MCNC: 97 MG/DL — SIGNIFICANT CHANGE UP (ref 70–99)
GLUCOSE FLD-MCNC: 169 MG/DL — SIGNIFICANT CHANGE UP
GLUCOSE SERPL-MCNC: 105 MG/DL — HIGH (ref 70–99)
GLUCOSE SERPL-MCNC: 106 MG/DL — HIGH (ref 70–99)
GLUCOSE SERPL-MCNC: 111 MG/DL — HIGH (ref 70–99)
GLUCOSE SERPL-MCNC: 115 MG/DL — HIGH (ref 70–99)
GLUCOSE SERPL-MCNC: 117 MG/DL — HIGH (ref 70–99)
GLUCOSE SERPL-MCNC: 119 MG/DL — HIGH (ref 70–99)
GLUCOSE SERPL-MCNC: 127 MG/DL — HIGH (ref 70–99)
GLUCOSE SERPL-MCNC: 128 MG/DL — HIGH (ref 70–99)
GLUCOSE SERPL-MCNC: 128 MG/DL — HIGH (ref 70–99)
GLUCOSE SERPL-MCNC: 129 MG/DL — HIGH (ref 70–99)
GLUCOSE SERPL-MCNC: 129 MG/DL — HIGH (ref 70–99)
GLUCOSE SERPL-MCNC: 135 MG/DL — HIGH (ref 70–99)
GLUCOSE SERPL-MCNC: 135 MG/DL — HIGH (ref 70–99)
GLUCOSE SERPL-MCNC: 136 MG/DL — HIGH (ref 70–99)
GLUCOSE SERPL-MCNC: 141 MG/DL — HIGH (ref 70–99)
GLUCOSE SERPL-MCNC: 143 MG/DL — HIGH (ref 70–99)
GLUCOSE SERPL-MCNC: 148 MG/DL — HIGH (ref 70–99)
GLUCOSE SERPL-MCNC: 150 MG/DL — HIGH (ref 70–99)
GLUCOSE SERPL-MCNC: 151 MG/DL — HIGH (ref 70–99)
GLUCOSE SERPL-MCNC: 152 MG/DL — HIGH (ref 70–99)
GLUCOSE SERPL-MCNC: 155 MG/DL — HIGH (ref 70–99)
GLUCOSE SERPL-MCNC: 157 MG/DL — HIGH (ref 70–99)
GLUCOSE SERPL-MCNC: 158 MG/DL — HIGH (ref 70–99)
GLUCOSE SERPL-MCNC: 161 MG/DL — HIGH (ref 70–99)
GLUCOSE SERPL-MCNC: 164 MG/DL — HIGH (ref 70–99)
GLUCOSE SERPL-MCNC: 164 MG/DL — HIGH (ref 70–99)
GLUCOSE SERPL-MCNC: 167 MG/DL — HIGH (ref 70–99)
GLUCOSE SERPL-MCNC: 168 MG/DL — HIGH (ref 70–99)
GLUCOSE SERPL-MCNC: 175 MG/DL — HIGH (ref 70–99)
GLUCOSE SERPL-MCNC: 175 MG/DL — HIGH (ref 70–99)
GLUCOSE SERPL-MCNC: 177 MG/DL — HIGH (ref 70–99)
GLUCOSE SERPL-MCNC: 180 MG/DL — HIGH (ref 70–99)
GLUCOSE SERPL-MCNC: 186 MG/DL — HIGH (ref 70–99)
GLUCOSE SERPL-MCNC: 187 MG/DL — HIGH (ref 70–99)
GLUCOSE SERPL-MCNC: 190 MG/DL — HIGH (ref 70–99)
GLUCOSE SERPL-MCNC: 191 MG/DL — HIGH (ref 70–99)
GLUCOSE SERPL-MCNC: 193 MG/DL — HIGH (ref 70–99)
GLUCOSE SERPL-MCNC: 194 MG/DL — HIGH (ref 70–99)
GLUCOSE SERPL-MCNC: 196 MG/DL — HIGH (ref 70–99)
GLUCOSE SERPL-MCNC: 207 MG/DL — HIGH (ref 70–99)
GLUCOSE SERPL-MCNC: 209 MG/DL — HIGH (ref 70–99)
GLUCOSE SERPL-MCNC: 211 MG/DL — HIGH (ref 70–99)
GLUCOSE SERPL-MCNC: 215 MG/DL — HIGH (ref 70–99)
GLUCOSE SERPL-MCNC: 218 MG/DL — HIGH (ref 70–99)
GLUCOSE SERPL-MCNC: 219 MG/DL — HIGH (ref 70–99)
GLUCOSE SERPL-MCNC: 222 MG/DL — HIGH (ref 70–99)
GLUCOSE SERPL-MCNC: 232 MG/DL — HIGH (ref 70–99)
GLUCOSE SERPL-MCNC: 240 MG/DL — HIGH (ref 70–99)
GLUCOSE SERPL-MCNC: 241 MG/DL — HIGH (ref 70–99)
GLUCOSE SERPL-MCNC: 245 MG/DL — HIGH (ref 70–99)
GLUCOSE SERPL-MCNC: 252 MG/DL — HIGH (ref 70–99)
GLUCOSE SERPL-MCNC: 252 MG/DL — HIGH (ref 70–99)
GLUCOSE SERPL-MCNC: 280 MG/DL — HIGH (ref 70–99)
GLUCOSE SERPL-MCNC: 326 MG/DL — HIGH (ref 70–99)
GLUCOSE SERPL-MCNC: 338 MG/DL — HIGH (ref 70–99)
GLUCOSE SERPL-MCNC: 342 MG/DL — HIGH (ref 70–99)
GLUCOSE SERPL-MCNC: 343 MG/DL — HIGH (ref 70–99)
GLUCOSE SERPL-MCNC: 400 MG/DL — HIGH (ref 70–99)
GLUCOSE SERPL-MCNC: 41 MG/DL — CRITICAL LOW (ref 70–99)
GLUCOSE SERPL-MCNC: 51 MG/DL — CRITICAL LOW (ref 70–99)
GLUCOSE SERPL-MCNC: 519 MG/DL — CRITICAL HIGH (ref 70–99)
GLUCOSE SERPL-MCNC: 58 MG/DL — LOW (ref 70–99)
GLUCOSE SERPL-MCNC: 59 MG/DL — LOW (ref 70–99)
GLUCOSE SERPL-MCNC: 63 MG/DL — LOW (ref 70–99)
GLUCOSE SERPL-MCNC: 665 MG/DL — CRITICAL HIGH (ref 70–99)
GLUCOSE SERPL-MCNC: 71 MG/DL — SIGNIFICANT CHANGE UP (ref 70–99)
GLUCOSE SERPL-MCNC: 75 MG/DL — SIGNIFICANT CHANGE UP (ref 70–99)
GLUCOSE SERPL-MCNC: 75 MG/DL — SIGNIFICANT CHANGE UP (ref 70–99)
GLUCOSE SERPL-MCNC: 96 MG/DL — SIGNIFICANT CHANGE UP (ref 70–99)
GLUCOSE UR QL: ABNORMAL
GLUCOSE UR QL: NEGATIVE — SIGNIFICANT CHANGE UP
GRAM STN FLD: SIGNIFICANT CHANGE UP
HADV DNA SPEC QL NAA+PROBE: SIGNIFICANT CHANGE UP
HCO3 BLDA-SCNC: 30 MMOL/L — HIGH (ref 21–28)
HCO3 BLDA-SCNC: 33 MMOL/L — HIGH (ref 21–28)
HCO3 BLDV-SCNC: 24 MMOL/L — SIGNIFICANT CHANGE UP (ref 22–29)
HCO3 BLDV-SCNC: 25 MMOL/L — SIGNIFICANT CHANGE UP (ref 22–29)
HCO3 BLDV-SCNC: 30 MMOL/L — HIGH (ref 22–29)
HCO3 BLDV-SCNC: 32 MMOL/L — HIGH (ref 22–29)
HCO3 BLDV-SCNC: 34 MMOL/L — HIGH (ref 22–29)
HCO3 BLDV-SCNC: 34 MMOL/L — HIGH (ref 22–29)
HCO3 BLDV-SCNC: 36 MMOL/L — HIGH (ref 22–29)
HCO3 BLDV-SCNC: 39 MMOL/L — HIGH (ref 22–29)
HCOV 229E RNA SPEC QL NAA+PROBE: SIGNIFICANT CHANGE UP
HCOV HKU1 RNA SPEC QL NAA+PROBE: SIGNIFICANT CHANGE UP
HCOV NL63 RNA SPEC QL NAA+PROBE: SIGNIFICANT CHANGE UP
HCOV OC43 RNA SPEC QL NAA+PROBE: SIGNIFICANT CHANGE UP
HCT VFR BLD CALC: 23.5 % — LOW (ref 39–50)
HCT VFR BLD CALC: 23.5 % — LOW (ref 39–50)
HCT VFR BLD CALC: 24 % — LOW (ref 39–50)
HCT VFR BLD CALC: 24.1 % — LOW (ref 39–50)
HCT VFR BLD CALC: 24.5 % — LOW (ref 39–50)
HCT VFR BLD CALC: 24.7 % — LOW (ref 39–50)
HCT VFR BLD CALC: 24.9 % — LOW (ref 39–50)
HCT VFR BLD CALC: 25.2 % — LOW (ref 39–50)
HCT VFR BLD CALC: 25.3 % — LOW (ref 39–50)
HCT VFR BLD CALC: 25.4 % — LOW (ref 39–50)
HCT VFR BLD CALC: 25.5 % — LOW (ref 39–50)
HCT VFR BLD CALC: 25.6 % — LOW (ref 39–50)
HCT VFR BLD CALC: 25.9 % — LOW (ref 39–50)
HCT VFR BLD CALC: 26.1 % — LOW (ref 39–50)
HCT VFR BLD CALC: 26.1 % — LOW (ref 39–50)
HCT VFR BLD CALC: 26.2 % — LOW (ref 39–50)
HCT VFR BLD CALC: 26.3 % — LOW (ref 39–50)
HCT VFR BLD CALC: 26.4 % — LOW (ref 39–50)
HCT VFR BLD CALC: 26.5 % — LOW (ref 39–50)
HCT VFR BLD CALC: 26.6 % — LOW (ref 39–50)
HCT VFR BLD CALC: 26.7 % — LOW (ref 39–50)
HCT VFR BLD CALC: 26.7 % — LOW (ref 39–50)
HCT VFR BLD CALC: 27 % — LOW (ref 39–50)
HCT VFR BLD CALC: 27.2 % — LOW (ref 39–50)
HCT VFR BLD CALC: 27.5 % — LOW (ref 39–50)
HCT VFR BLD CALC: 27.5 % — LOW (ref 39–50)
HCT VFR BLD CALC: 27.6 % — LOW (ref 39–50)
HCT VFR BLD CALC: 27.8 % — LOW (ref 39–50)
HCT VFR BLD CALC: 27.8 % — LOW (ref 39–50)
HCT VFR BLD CALC: 28.3 % — LOW (ref 39–50)
HCT VFR BLD CALC: 28.4 % — LOW (ref 39–50)
HCT VFR BLD CALC: 28.5 % — LOW (ref 39–50)
HCT VFR BLD CALC: 28.7 % — LOW (ref 39–50)
HCT VFR BLD CALC: 29 % — LOW (ref 39–50)
HCT VFR BLD CALC: 29.1 % — LOW (ref 39–50)
HCT VFR BLD CALC: 29.5 % — LOW (ref 39–50)
HCT VFR BLD CALC: 30 % — LOW (ref 39–50)
HCT VFR BLD CALC: 30.1 % — LOW (ref 39–50)
HCT VFR BLD CALC: 30.2 % — LOW (ref 39–50)
HCT VFR BLD CALC: 30.4 % — LOW (ref 39–50)
HCT VFR BLD CALC: 31.3 % — LOW (ref 39–50)
HCT VFR BLD CALC: 31.4 % — LOW (ref 39–50)
HCT VFR BLD CALC: 31.4 % — LOW (ref 39–50)
HCT VFR BLD CALC: 31.7 % — LOW (ref 39–50)
HCT VFR BLD CALC: 33.5 % — LOW (ref 39–50)
HCT VFR BLD CALC: 34.4 % — LOW (ref 39–50)
HCT VFR BLD CALC: 34.5 % — LOW (ref 39–50)
HCT VFR BLD CALC: 38.4 % — LOW (ref 39–50)
HCT VFR BLDA CALC: 22 % — LOW (ref 39–51)
HCT VFR BLDA CALC: 23 % — LOW (ref 39–51)
HCT VFR BLDA CALC: 24 % — LOW (ref 39–51)
HCT VFR BLDA CALC: 25 % — LOW (ref 39–51)
HCT VFR BLDA CALC: 26 % — LOW (ref 39–51)
HCT VFR BLDA CALC: 26 % — LOW (ref 39–51)
HCT VFR BLDA CALC: 27 % — LOW (ref 39–51)
HCT VFR BLDA CALC: 28 % — LOW (ref 39–51)
HDLC SERPL-MCNC: 26 MG/DL — LOW
HGB BLD CALC-MCNC: 7.3 G/DL — LOW (ref 12.6–17.4)
HGB BLD CALC-MCNC: 7.5 G/DL — LOW (ref 12.6–17.4)
HGB BLD CALC-MCNC: 8.1 G/DL — LOW (ref 12.6–17.4)
HGB BLD CALC-MCNC: 8.2 G/DL — LOW (ref 13–17)
HGB BLD CALC-MCNC: 8.7 G/DL — LOW (ref 12.6–17.4)
HGB BLD CALC-MCNC: 8.8 G/DL — LOW (ref 13–17)
HGB BLD CALC-MCNC: 9.1 G/DL — LOW (ref 13–17)
HGB BLD CALC-MCNC: 9.2 G/DL — LOW (ref 12.6–17.4)
HGB BLD-MCNC: 10.5 G/DL — LOW (ref 13–17)
HGB BLD-MCNC: 6.9 G/DL — CRITICAL LOW (ref 13–17)
HGB BLD-MCNC: 6.9 G/DL — CRITICAL LOW (ref 13–17)
HGB BLD-MCNC: 7.1 G/DL — LOW (ref 13–17)
HGB BLD-MCNC: 7.1 G/DL — LOW (ref 13–17)
HGB BLD-MCNC: 7.2 G/DL — LOW (ref 13–17)
HGB BLD-MCNC: 7.3 G/DL — LOW (ref 13–17)
HGB BLD-MCNC: 7.3 G/DL — LOW (ref 13–17)
HGB BLD-MCNC: 7.4 G/DL — LOW (ref 13–17)
HGB BLD-MCNC: 7.5 G/DL — LOW (ref 13–17)
HGB BLD-MCNC: 7.7 G/DL — LOW (ref 13–17)
HGB BLD-MCNC: 7.8 G/DL — LOW (ref 13–17)
HGB BLD-MCNC: 7.9 G/DL — LOW (ref 13–17)
HGB BLD-MCNC: 8 G/DL — LOW (ref 13–17)
HGB BLD-MCNC: 8.1 G/DL — LOW (ref 13–17)
HGB BLD-MCNC: 8.1 G/DL — LOW (ref 13–17)
HGB BLD-MCNC: 8.2 G/DL — LOW (ref 13–17)
HGB BLD-MCNC: 8.2 G/DL — LOW (ref 13–17)
HGB BLD-MCNC: 8.3 G/DL — LOW (ref 13–17)
HGB BLD-MCNC: 8.3 G/DL — LOW (ref 13–17)
HGB BLD-MCNC: 8.4 G/DL — LOW (ref 13–17)
HGB BLD-MCNC: 8.5 G/DL — LOW (ref 13–17)
HGB BLD-MCNC: 8.6 G/DL — LOW (ref 13–17)
HGB BLD-MCNC: 8.7 G/DL — LOW (ref 13–17)
HGB BLD-MCNC: 8.7 G/DL — LOW (ref 13–17)
HGB BLD-MCNC: 8.9 G/DL — LOW (ref 13–17)
HGB BLD-MCNC: 8.9 G/DL — LOW (ref 13–17)
HGB BLD-MCNC: 9 G/DL — LOW (ref 13–17)
HGB BLD-MCNC: 9.4 G/DL — LOW (ref 13–17)
HGB BLD-MCNC: 9.8 G/DL — LOW (ref 13–17)
HGB BLD-MCNC: 9.8 G/DL — LOW (ref 13–17)
HMPV RNA SPEC QL NAA+PROBE: SIGNIFICANT CHANGE UP
HOROWITZ INDEX BLDA+IHG-RTO: 28 — SIGNIFICANT CHANGE UP
HOROWITZ INDEX BLDV+IHG-RTO: 25 — SIGNIFICANT CHANGE UP
HOROWITZ INDEX BLDV+IHG-RTO: SIGNIFICANT CHANGE UP
HPIV1 RNA SPEC QL NAA+PROBE: SIGNIFICANT CHANGE UP
HPIV2 RNA SPEC QL NAA+PROBE: SIGNIFICANT CHANGE UP
HPIV3 RNA SPEC QL NAA+PROBE: SIGNIFICANT CHANGE UP
HPIV4 RNA SPEC QL NAA+PROBE: SIGNIFICANT CHANGE UP
HYALINE CASTS # UR AUTO: 1 /LPF — SIGNIFICANT CHANGE UP (ref 0–2)
HYALINE CASTS # UR AUTO: 6 /LPF — SIGNIFICANT CHANGE UP (ref 0–7)
IANC: 6.11 K/UL — SIGNIFICANT CHANGE UP (ref 1.8–7.4)
IANC: 7.37 K/UL — SIGNIFICANT CHANGE UP (ref 1.8–7.4)
IMM GRANULOCYTES NFR BLD AUTO: 0.3 % — SIGNIFICANT CHANGE UP (ref 0–0.9)
IMM GRANULOCYTES NFR BLD AUTO: 0.3 % — SIGNIFICANT CHANGE UP (ref 0–1.5)
IMM GRANULOCYTES NFR BLD AUTO: 0.4 % — SIGNIFICANT CHANGE UP (ref 0–0.9)
IMM GRANULOCYTES NFR BLD AUTO: 0.5 % — SIGNIFICANT CHANGE UP (ref 0–1.5)
IMM GRANULOCYTES NFR BLD AUTO: 1.7 % — HIGH (ref 0–1.5)
INR BLD: 1.01 RATIO — SIGNIFICANT CHANGE UP (ref 0.88–1.16)
INR BLD: 1.05 RATIO — SIGNIFICANT CHANGE UP (ref 0.88–1.16)
INR BLD: 1.07 RATIO — SIGNIFICANT CHANGE UP (ref 0.88–1.16)
INR BLD: 1.09 RATIO — SIGNIFICANT CHANGE UP (ref 0.88–1.16)
INR BLD: 1.13 RATIO — SIGNIFICANT CHANGE UP (ref 0.88–1.16)
INR BLD: 1.14 RATIO — SIGNIFICANT CHANGE UP (ref 0.88–1.16)
INR BLD: 1.16 RATIO — SIGNIFICANT CHANGE UP (ref 0.88–1.16)
INR BLD: 1.16 RATIO — SIGNIFICANT CHANGE UP (ref 0.88–1.16)
INR BLD: 1.18 RATIO — HIGH (ref 0.88–1.16)
INR BLD: 1.2 RATIO — HIGH (ref 0.88–1.16)
INR BLD: 1.2 RATIO — HIGH (ref 0.88–1.16)
INR BLD: 1.21 RATIO — HIGH (ref 0.88–1.16)
INR BLD: 1.24 RATIO — HIGH (ref 0.88–1.16)
INR BLD: 1.25 RATIO — HIGH (ref 0.88–1.16)
INR BLD: 1.3 RATIO — HIGH (ref 0.88–1.16)
INR BLD: 1.32 RATIO — HIGH (ref 0.88–1.16)
INR BLD: 1.4 RATIO — HIGH (ref 0.88–1.16)
INR BLD: 1.45 RATIO — HIGH (ref 0.88–1.16)
INR BLD: 1.57 RATIO — HIGH (ref 0.88–1.16)
INR BLD: 1.58 RATIO — HIGH (ref 0.88–1.16)
INR BLD: 1.63 RATIO — HIGH (ref 0.88–1.16)
INR BLD: 1.63 RATIO — HIGH (ref 0.88–1.16)
INR BLD: 1.71 RATIO — HIGH (ref 0.88–1.16)
INR BLD: 1.93 RATIO — HIGH (ref 0.88–1.16)
INR BLD: 2 RATIO — HIGH (ref 0.88–1.16)
INR BLD: 2.05 RATIO — HIGH (ref 0.88–1.16)
INR BLD: 2.07 RATIO — HIGH (ref 0.88–1.16)
INR BLD: 2.12 RATIO — HIGH (ref 0.88–1.16)
INR BLD: 2.27 RATIO — HIGH (ref 0.88–1.16)
INR BLD: 2.43 RATIO — HIGH (ref 0.88–1.16)
INR BLD: 2.52 RATIO — HIGH (ref 0.88–1.16)
INR BLD: 2.53 RATIO — HIGH (ref 0.88–1.16)
INR BLD: 2.63 RATIO — HIGH (ref 0.88–1.16)
INR BLD: 2.95 RATIO — HIGH (ref 0.88–1.16)
INR BLD: 3.22 RATIO — HIGH (ref 0.88–1.16)
INR BLD: 3.67 RATIO — HIGH (ref 0.88–1.16)
INR BLD: 3.97 RATIO — HIGH (ref 0.88–1.16)
INR BLD: 4.58 RATIO — HIGH (ref 0.88–1.16)
INR PPP: 3 RATIO
INR PPP: 4.94
IRON SATN MFR SERPL: 21 UG/DL — LOW (ref 45–165)
IRON SATN MFR SERPL: 8 % — LOW (ref 14–50)
KETONES UR-MCNC: ABNORMAL
KETONES UR-MCNC: NEGATIVE — SIGNIFICANT CHANGE UP
LACTATE BLDV-MCNC: 1 MMOL/L — SIGNIFICANT CHANGE UP (ref 0.7–2)
LACTATE BLDV-MCNC: 1.2 MMOL/L — SIGNIFICANT CHANGE UP (ref 0.5–2)
LACTATE BLDV-MCNC: 1.5 MMOL/L — SIGNIFICANT CHANGE UP (ref 0.7–2)
LACTATE BLDV-MCNC: 1.6 MMOL/L — SIGNIFICANT CHANGE UP (ref 0.5–2)
LACTATE BLDV-MCNC: 1.7 MMOL/L — SIGNIFICANT CHANGE UP (ref 0.5–2)
LACTATE BLDV-MCNC: 2.2 MMOL/L — HIGH (ref 0.5–2)
LACTATE BLDV-MCNC: 2.4 MMOL/L — HIGH (ref 0.7–2)
LACTATE BLDV-MCNC: 2.5 MMOL/L — HIGH (ref 0.7–2)
LDH SERPL L TO P-CCNC: 124 U/L — SIGNIFICANT CHANGE UP
LDH SERPL L TO P-CCNC: 189 U/L — SIGNIFICANT CHANGE UP (ref 135–225)
LEUKOCYTE ESTERASE UR-ACNC: ABNORMAL
LEUKOCYTE ESTERASE UR-ACNC: ABNORMAL
LEUKOCYTE ESTERASE UR-ACNC: NEGATIVE — SIGNIFICANT CHANGE UP
LEUKOCYTE ESTERASE UR-ACNC: NEGATIVE — SIGNIFICANT CHANGE UP
LIDOCAIN IGE QN: 14 U/L — SIGNIFICANT CHANGE UP (ref 7–60)
LIPID PNL WITH DIRECT LDL SERPL: 44 MG/DL — SIGNIFICANT CHANGE UP
LYMPHOCYTES # BLD AUTO: 0.4 K/UL — LOW (ref 1–3.3)
LYMPHOCYTES # BLD AUTO: 0.98 K/UL — LOW (ref 1–3.3)
LYMPHOCYTES # BLD AUTO: 0.99 K/UL — LOW (ref 1–3.3)
LYMPHOCYTES # BLD AUTO: 1.55 K/UL — SIGNIFICANT CHANGE UP (ref 1–3.3)
LYMPHOCYTES # BLD AUTO: 1.84 K/UL — SIGNIFICANT CHANGE UP (ref 1–3.3)
LYMPHOCYTES # BLD AUTO: 1.92 K/UL — SIGNIFICANT CHANGE UP (ref 1–3.3)
LYMPHOCYTES # BLD AUTO: 11.6 % — LOW (ref 13–44)
LYMPHOCYTES # BLD AUTO: 13.2 % — SIGNIFICANT CHANGE UP (ref 13–44)
LYMPHOCYTES # BLD AUTO: 17.9 % — SIGNIFICANT CHANGE UP (ref 13–44)
LYMPHOCYTES # BLD AUTO: 19.6 % — SIGNIFICANT CHANGE UP (ref 13–44)
LYMPHOCYTES # BLD AUTO: 5.1 % — LOW (ref 13–44)
LYMPHOCYTES # BLD AUTO: 9.6 % — LOW (ref 13–44)
LYMPHOCYTES # FLD: 25 % — SIGNIFICANT CHANGE UP
M PNEUMO DNA SPEC QL NAA+PROBE: SIGNIFICANT CHANGE UP
MAGNESIUM SERPL-MCNC: 1.5 MG/DL — LOW (ref 1.6–2.6)
MAGNESIUM SERPL-MCNC: 1.5 MG/DL — LOW (ref 1.6–2.6)
MAGNESIUM SERPL-MCNC: 1.6 MG/DL — SIGNIFICANT CHANGE UP (ref 1.6–2.6)
MAGNESIUM SERPL-MCNC: 1.7 MG/DL — SIGNIFICANT CHANGE UP (ref 1.6–2.6)
MAGNESIUM SERPL-MCNC: 1.8 MG/DL — SIGNIFICANT CHANGE UP (ref 1.6–2.6)
MAGNESIUM SERPL-MCNC: 1.9 MG/DL — SIGNIFICANT CHANGE UP (ref 1.6–2.6)
MAGNESIUM SERPL-MCNC: 1.9 MG/DL — SIGNIFICANT CHANGE UP (ref 1.6–2.6)
MAGNESIUM SERPL-MCNC: 2 MG/DL — SIGNIFICANT CHANGE UP (ref 1.6–2.6)
MAGNESIUM SERPL-MCNC: 2.1 MG/DL — SIGNIFICANT CHANGE UP (ref 1.6–2.6)
MAGNESIUM SERPL-MCNC: 2.2 MG/DL — SIGNIFICANT CHANGE UP (ref 1.6–2.6)
MAGNESIUM SERPL-MCNC: 2.3 MG/DL — SIGNIFICANT CHANGE UP (ref 1.6–2.6)
MAGNESIUM SERPL-MCNC: 2.4 MG/DL — SIGNIFICANT CHANGE UP (ref 1.6–2.6)
MAGNESIUM SERPL-MCNC: 2.5 MG/DL — SIGNIFICANT CHANGE UP (ref 1.6–2.6)
MAGNESIUM SERPL-MCNC: 2.5 MG/DL — SIGNIFICANT CHANGE UP (ref 1.6–2.6)
MAGNESIUM SERPL-MCNC: 2.6 MG/DL — SIGNIFICANT CHANGE UP (ref 1.6–2.6)
MANUAL SMEAR VERIFICATION: SIGNIFICANT CHANGE UP
MCHC RBC-ENTMCNC: 22.5 PG — LOW (ref 27–34)
MCHC RBC-ENTMCNC: 22.7 PG — LOW (ref 27–34)
MCHC RBC-ENTMCNC: 22.7 PG — LOW (ref 27–34)
MCHC RBC-ENTMCNC: 22.8 PG — LOW (ref 27–34)
MCHC RBC-ENTMCNC: 23.1 PG — LOW (ref 27–34)
MCHC RBC-ENTMCNC: 23.7 PG — LOW (ref 27–34)
MCHC RBC-ENTMCNC: 23.8 PG — LOW (ref 27–34)
MCHC RBC-ENTMCNC: 23.8 PG — LOW (ref 27–34)
MCHC RBC-ENTMCNC: 23.9 PG — LOW (ref 27–34)
MCHC RBC-ENTMCNC: 23.9 PG — LOW (ref 27–34)
MCHC RBC-ENTMCNC: 24 PG — LOW (ref 27–34)
MCHC RBC-ENTMCNC: 24 PG — LOW (ref 27–34)
MCHC RBC-ENTMCNC: 24.1 PG — LOW (ref 27–34)
MCHC RBC-ENTMCNC: 24.2 PG — LOW (ref 27–34)
MCHC RBC-ENTMCNC: 24.3 PG — LOW (ref 27–34)
MCHC RBC-ENTMCNC: 24.4 PG — LOW (ref 27–34)
MCHC RBC-ENTMCNC: 24.4 PG — LOW (ref 27–34)
MCHC RBC-ENTMCNC: 24.5 PG — LOW (ref 27–34)
MCHC RBC-ENTMCNC: 24.6 PG — LOW (ref 27–34)
MCHC RBC-ENTMCNC: 24.7 PG — LOW (ref 27–34)
MCHC RBC-ENTMCNC: 24.7 PG — LOW (ref 27–34)
MCHC RBC-ENTMCNC: 24.8 PG — LOW (ref 27–34)
MCHC RBC-ENTMCNC: 24.9 PG — LOW (ref 27–34)
MCHC RBC-ENTMCNC: 25 PG — LOW (ref 27–34)
MCHC RBC-ENTMCNC: 25 PG — LOW (ref 27–34)
MCHC RBC-ENTMCNC: 25.1 PG — LOW (ref 27–34)
MCHC RBC-ENTMCNC: 25.2 PG — LOW (ref 27–34)
MCHC RBC-ENTMCNC: 25.2 PG — LOW (ref 27–34)
MCHC RBC-ENTMCNC: 25.3 PG — LOW (ref 27–34)
MCHC RBC-ENTMCNC: 25.4 PG — LOW (ref 27–34)
MCHC RBC-ENTMCNC: 25.5 PG — LOW (ref 27–34)
MCHC RBC-ENTMCNC: 25.6 PG — LOW (ref 27–34)
MCHC RBC-ENTMCNC: 25.8 PG — LOW (ref 27–34)
MCHC RBC-ENTMCNC: 25.8 PG — LOW (ref 27–34)
MCHC RBC-ENTMCNC: 25.9 PG — LOW (ref 27–34)
MCHC RBC-ENTMCNC: 25.9 PG — LOW (ref 27–34)
MCHC RBC-ENTMCNC: 26 PG — LOW (ref 27–34)
MCHC RBC-ENTMCNC: 26.1 PG — LOW (ref 27–34)
MCHC RBC-ENTMCNC: 26.8 GM/DL — LOW (ref 32–36)
MCHC RBC-ENTMCNC: 27.3 GM/DL — LOW (ref 32–36)
MCHC RBC-ENTMCNC: 27.3 GM/DL — LOW (ref 32–36)
MCHC RBC-ENTMCNC: 27.6 GM/DL — LOW (ref 32–36)
MCHC RBC-ENTMCNC: 27.8 GM/DL — LOW (ref 32–36)
MCHC RBC-ENTMCNC: 27.8 GM/DL — LOW (ref 32–36)
MCHC RBC-ENTMCNC: 28 GM/DL — LOW (ref 32–36)
MCHC RBC-ENTMCNC: 28.1 GM/DL — LOW (ref 32–36)
MCHC RBC-ENTMCNC: 28.2 GM/DL — LOW (ref 32–36)
MCHC RBC-ENTMCNC: 28.3 GM/DL — LOW (ref 32–36)
MCHC RBC-ENTMCNC: 28.3 GM/DL — LOW (ref 32–36)
MCHC RBC-ENTMCNC: 28.4 GM/DL — LOW (ref 32–36)
MCHC RBC-ENTMCNC: 28.4 GM/DL — LOW (ref 32–36)
MCHC RBC-ENTMCNC: 28.5 GM/DL — LOW (ref 32–36)
MCHC RBC-ENTMCNC: 28.5 GM/DL — LOW (ref 32–36)
MCHC RBC-ENTMCNC: 28.7 GM/DL — LOW (ref 32–36)
MCHC RBC-ENTMCNC: 28.7 GM/DL — LOW (ref 32–36)
MCHC RBC-ENTMCNC: 28.8 GM/DL — LOW (ref 32–36)
MCHC RBC-ENTMCNC: 28.8 GM/DL — LOW (ref 32–36)
MCHC RBC-ENTMCNC: 29 GM/DL — LOW (ref 32–36)
MCHC RBC-ENTMCNC: 29 GM/DL — LOW (ref 32–36)
MCHC RBC-ENTMCNC: 29.1 GM/DL — LOW (ref 32–36)
MCHC RBC-ENTMCNC: 29.2 GM/DL — LOW (ref 32–36)
MCHC RBC-ENTMCNC: 29.3 GM/DL — LOW (ref 32–36)
MCHC RBC-ENTMCNC: 29.4 GM/DL — LOW (ref 32–36)
MCHC RBC-ENTMCNC: 29.4 GM/DL — LOW (ref 32–36)
MCHC RBC-ENTMCNC: 29.5 GM/DL — LOW (ref 32–36)
MCHC RBC-ENTMCNC: 29.6 GM/DL — LOW (ref 32–36)
MCHC RBC-ENTMCNC: 29.7 GM/DL — LOW (ref 32–36)
MCHC RBC-ENTMCNC: 29.8 GM/DL — LOW (ref 32–36)
MCHC RBC-ENTMCNC: 30 GM/DL — LOW (ref 32–36)
MCHC RBC-ENTMCNC: 30.1 GM/DL — LOW (ref 32–36)
MCHC RBC-ENTMCNC: 30.2 GM/DL — LOW (ref 32–36)
MCHC RBC-ENTMCNC: 30.2 GM/DL — LOW (ref 32–36)
MCHC RBC-ENTMCNC: 30.3 GM/DL — LOW (ref 32–36)
MCHC RBC-ENTMCNC: 30.5 GM/DL — LOW (ref 32–36)
MCHC RBC-ENTMCNC: 30.6 GM/DL — LOW (ref 32–36)
MCHC RBC-ENTMCNC: 30.6 GM/DL — LOW (ref 32–36)
MCHC RBC-ENTMCNC: 30.7 GM/DL — LOW (ref 32–36)
MCHC RBC-ENTMCNC: 30.9 GM/DL — LOW (ref 32–36)
MCHC RBC-ENTMCNC: 31.1 GM/DL — LOW (ref 32–36)
MCHC RBC-ENTMCNC: 31.2 GM/DL — LOW (ref 32–36)
MCV RBC AUTO: 78.9 FL — LOW (ref 80–100)
MCV RBC AUTO: 80 FL — SIGNIFICANT CHANGE UP (ref 80–100)
MCV RBC AUTO: 80.5 FL — SIGNIFICANT CHANGE UP (ref 80–100)
MCV RBC AUTO: 81.4 FL — SIGNIFICANT CHANGE UP (ref 80–100)
MCV RBC AUTO: 81.6 FL — SIGNIFICANT CHANGE UP (ref 80–100)
MCV RBC AUTO: 81.9 FL — SIGNIFICANT CHANGE UP (ref 80–100)
MCV RBC AUTO: 82.2 FL — SIGNIFICANT CHANGE UP (ref 80–100)
MCV RBC AUTO: 82.4 FL — SIGNIFICANT CHANGE UP (ref 80–100)
MCV RBC AUTO: 82.5 FL — SIGNIFICANT CHANGE UP (ref 80–100)
MCV RBC AUTO: 82.6 FL — SIGNIFICANT CHANGE UP (ref 80–100)
MCV RBC AUTO: 83.1 FL — SIGNIFICANT CHANGE UP (ref 80–100)
MCV RBC AUTO: 83.1 FL — SIGNIFICANT CHANGE UP (ref 80–100)
MCV RBC AUTO: 83.2 FL — SIGNIFICANT CHANGE UP (ref 80–100)
MCV RBC AUTO: 83.2 FL — SIGNIFICANT CHANGE UP (ref 80–100)
MCV RBC AUTO: 83.3 FL — SIGNIFICANT CHANGE UP (ref 80–100)
MCV RBC AUTO: 83.3 FL — SIGNIFICANT CHANGE UP (ref 80–100)
MCV RBC AUTO: 83.4 FL — SIGNIFICANT CHANGE UP (ref 80–100)
MCV RBC AUTO: 83.4 FL — SIGNIFICANT CHANGE UP (ref 80–100)
MCV RBC AUTO: 83.5 FL — SIGNIFICANT CHANGE UP (ref 80–100)
MCV RBC AUTO: 83.7 FL — SIGNIFICANT CHANGE UP (ref 80–100)
MCV RBC AUTO: 83.8 FL — SIGNIFICANT CHANGE UP (ref 80–100)
MCV RBC AUTO: 84 FL — SIGNIFICANT CHANGE UP (ref 80–100)
MCV RBC AUTO: 84 FL — SIGNIFICANT CHANGE UP (ref 80–100)
MCV RBC AUTO: 84.1 FL — SIGNIFICANT CHANGE UP (ref 80–100)
MCV RBC AUTO: 84.1 FL — SIGNIFICANT CHANGE UP (ref 80–100)
MCV RBC AUTO: 84.3 FL — SIGNIFICANT CHANGE UP (ref 80–100)
MCV RBC AUTO: 84.5 FL — SIGNIFICANT CHANGE UP (ref 80–100)
MCV RBC AUTO: 84.5 FL — SIGNIFICANT CHANGE UP (ref 80–100)
MCV RBC AUTO: 84.6 FL — SIGNIFICANT CHANGE UP (ref 80–100)
MCV RBC AUTO: 84.7 FL — SIGNIFICANT CHANGE UP (ref 80–100)
MCV RBC AUTO: 84.8 FL — SIGNIFICANT CHANGE UP (ref 80–100)
MCV RBC AUTO: 85.4 FL — SIGNIFICANT CHANGE UP (ref 80–100)
MCV RBC AUTO: 85.6 FL — SIGNIFICANT CHANGE UP (ref 80–100)
MCV RBC AUTO: 85.6 FL — SIGNIFICANT CHANGE UP (ref 80–100)
MCV RBC AUTO: 85.8 FL — SIGNIFICANT CHANGE UP (ref 80–100)
MCV RBC AUTO: 85.8 FL — SIGNIFICANT CHANGE UP (ref 80–100)
MCV RBC AUTO: 85.9 FL — SIGNIFICANT CHANGE UP (ref 80–100)
MCV RBC AUTO: 85.9 FL — SIGNIFICANT CHANGE UP (ref 80–100)
MCV RBC AUTO: 86 FL — SIGNIFICANT CHANGE UP (ref 80–100)
MCV RBC AUTO: 86.2 FL — SIGNIFICANT CHANGE UP (ref 80–100)
MCV RBC AUTO: 86.9 FL — SIGNIFICANT CHANGE UP (ref 80–100)
MCV RBC AUTO: 87.1 FL — SIGNIFICANT CHANGE UP (ref 80–100)
MCV RBC AUTO: 87.2 FL — SIGNIFICANT CHANGE UP (ref 80–100)
MCV RBC AUTO: 87.2 FL — SIGNIFICANT CHANGE UP (ref 80–100)
MCV RBC AUTO: 87.3 FL — SIGNIFICANT CHANGE UP (ref 80–100)
MCV RBC AUTO: 87.7 FL — SIGNIFICANT CHANGE UP (ref 80–100)
MCV RBC AUTO: 88.5 FL — SIGNIFICANT CHANGE UP (ref 80–100)
MESOTHL CELL # FLD: 17 % — SIGNIFICANT CHANGE UP
MONOCYTES # BLD AUTO: 0.03 K/UL — SIGNIFICANT CHANGE UP (ref 0–0.9)
MONOCYTES # BLD AUTO: 0.26 K/UL — SIGNIFICANT CHANGE UP (ref 0–0.9)
MONOCYTES # BLD AUTO: 0.58 K/UL — SIGNIFICANT CHANGE UP (ref 0–0.9)
MONOCYTES # BLD AUTO: 0.58 K/UL — SIGNIFICANT CHANGE UP (ref 0–0.9)
MONOCYTES # BLD AUTO: 0.82 K/UL — SIGNIFICANT CHANGE UP (ref 0–0.9)
MONOCYTES # BLD AUTO: 1.22 K/UL — HIGH (ref 0–0.9)
MONOCYTES NFR BLD AUTO: 0.4 % — LOW (ref 2–14)
MONOCYTES NFR BLD AUTO: 3.5 % — SIGNIFICANT CHANGE UP (ref 2–14)
MONOCYTES NFR BLD AUTO: 6.1 % — SIGNIFICANT CHANGE UP (ref 2–14)
MONOCYTES NFR BLD AUTO: 6.2 % — SIGNIFICANT CHANGE UP (ref 2–14)
MONOCYTES NFR BLD AUTO: 6.9 % — SIGNIFICANT CHANGE UP (ref 2–14)
MONOCYTES NFR BLD AUTO: 9.5 % — SIGNIFICANT CHANGE UP (ref 2–14)
MONOS+MACROS # FLD: 37 % — SIGNIFICANT CHANGE UP
MRSA PCR RESULT.: SIGNIFICANT CHANGE UP
NEUTROPHILS # BLD AUTO: 16.41 K/UL — HIGH (ref 1.8–7.4)
NEUTROPHILS # BLD AUTO: 5.91 K/UL — SIGNIFICANT CHANGE UP (ref 1.8–7.4)
NEUTROPHILS # BLD AUTO: 6.11 K/UL — SIGNIFICANT CHANGE UP (ref 1.8–7.4)
NEUTROPHILS # BLD AUTO: 6.45 K/UL — SIGNIFICANT CHANGE UP (ref 1.8–7.4)
NEUTROPHILS # BLD AUTO: 6.62 K/UL — SIGNIFICANT CHANGE UP (ref 1.8–7.4)
NEUTROPHILS # BLD AUTO: 7.37 K/UL — SIGNIFICANT CHANGE UP (ref 1.8–7.4)
NEUTROPHILS NFR BLD AUTO: 68.5 % — SIGNIFICANT CHANGE UP (ref 43–77)
NEUTROPHILS NFR BLD AUTO: 70.5 % — SIGNIFICANT CHANGE UP (ref 43–77)
NEUTROPHILS NFR BLD AUTO: 78.6 % — HIGH (ref 43–77)
NEUTROPHILS NFR BLD AUTO: 78.9 % — HIGH (ref 43–77)
NEUTROPHILS NFR BLD AUTO: 82 % — HIGH (ref 43–77)
NEUTROPHILS NFR BLD AUTO: 93.8 % — HIGH (ref 43–77)
NEUTROPHILS-BODY FLUID: 21 % — SIGNIFICANT CHANGE UP
NITRITE UR-MCNC: NEGATIVE — SIGNIFICANT CHANGE UP
NON HDL CHOLESTEROL: 65 MG/DL — SIGNIFICANT CHANGE UP
NON-GYNECOLOGICAL CYTOLOGY STUDY: SIGNIFICANT CHANGE UP
NRBC # BLD: 0 /100 WBCS — SIGNIFICANT CHANGE UP (ref 0–0)
NRBC # FLD: 0 K/UL — SIGNIFICANT CHANGE UP (ref 0–0)
NT-PROBNP SERPL-SCNC: 2388 PG/ML — HIGH (ref 0–300)
NT-PROBNP SERPL-SCNC: 4737 PG/ML — HIGH
OSMOLALITY UR: 455 MOS/KG — SIGNIFICANT CHANGE UP (ref 300–900)
PCO2 BLDA: 41 MMHG — SIGNIFICANT CHANGE UP (ref 35–48)
PCO2 BLDA: 41 MMHG — SIGNIFICANT CHANGE UP (ref 35–48)
PCO2 BLDV: 39 MMHG — LOW (ref 42–55)
PCO2 BLDV: 42 MMHG — SIGNIFICANT CHANGE UP (ref 42–55)
PCO2 BLDV: 49 MMHG — SIGNIFICANT CHANGE UP (ref 42–55)
PCO2 BLDV: 52 MMHG — SIGNIFICANT CHANGE UP (ref 42–55)
PCO2 BLDV: 57 MMHG — HIGH (ref 42–55)
PCO2 BLDV: 59 MMHG — HIGH (ref 42–55)
PCO2 BLDV: 60 MMHG — HIGH (ref 42–55)
PCO2 BLDV: 65 MMHG — HIGH (ref 42–55)
PH BLDA: 7.48 — HIGH (ref 7.35–7.45)
PH BLDA: 7.51 — HIGH (ref 7.35–7.45)
PH BLDV: 7.37 — SIGNIFICANT CHANGE UP (ref 7.32–7.43)
PH BLDV: 7.38 — SIGNIFICANT CHANGE UP (ref 7.32–7.43)
PH BLDV: 7.38 — SIGNIFICANT CHANGE UP (ref 7.32–7.43)
PH BLDV: 7.39 — SIGNIFICANT CHANGE UP (ref 7.32–7.43)
PH BLDV: 7.4 — SIGNIFICANT CHANGE UP (ref 7.32–7.43)
PH BLDV: 7.4 — SIGNIFICANT CHANGE UP (ref 7.32–7.43)
PH FLD: 8.4 — SIGNIFICANT CHANGE UP
PH UR: 6 — SIGNIFICANT CHANGE UP (ref 5–8)
PH UR: 7 — SIGNIFICANT CHANGE UP (ref 5–8)
PHOSPHATE SERPL-MCNC: 2 MG/DL — LOW (ref 2.5–4.5)
PHOSPHATE SERPL-MCNC: 2.2 MG/DL — LOW (ref 2.5–4.5)
PHOSPHATE SERPL-MCNC: 2.3 MG/DL — LOW (ref 2.5–4.5)
PHOSPHATE SERPL-MCNC: 2.4 MG/DL — LOW (ref 2.5–4.5)
PHOSPHATE SERPL-MCNC: 2.6 MG/DL — SIGNIFICANT CHANGE UP (ref 2.5–4.5)
PHOSPHATE SERPL-MCNC: 2.7 MG/DL — SIGNIFICANT CHANGE UP (ref 2.5–4.5)
PHOSPHATE SERPL-MCNC: 2.7 MG/DL — SIGNIFICANT CHANGE UP (ref 2.5–4.5)
PHOSPHATE SERPL-MCNC: 2.8 MG/DL — SIGNIFICANT CHANGE UP (ref 2.5–4.5)
PHOSPHATE SERPL-MCNC: 3 MG/DL — SIGNIFICANT CHANGE UP (ref 2.5–4.5)
PHOSPHATE SERPL-MCNC: 3.1 MG/DL — SIGNIFICANT CHANGE UP (ref 2.5–4.5)
PHOSPHATE SERPL-MCNC: 3.1 MG/DL — SIGNIFICANT CHANGE UP (ref 2.5–4.5)
PHOSPHATE SERPL-MCNC: 3.2 MG/DL — SIGNIFICANT CHANGE UP (ref 2.5–4.5)
PHOSPHATE SERPL-MCNC: 3.3 MG/DL — SIGNIFICANT CHANGE UP (ref 2.5–4.5)
PHOSPHATE SERPL-MCNC: 3.4 MG/DL — SIGNIFICANT CHANGE UP (ref 2.5–4.5)
PHOSPHATE SERPL-MCNC: 3.5 MG/DL — SIGNIFICANT CHANGE UP (ref 2.5–4.5)
PHOSPHATE SERPL-MCNC: 3.6 MG/DL — SIGNIFICANT CHANGE UP (ref 2.5–4.5)
PHOSPHATE SERPL-MCNC: 3.7 MG/DL — SIGNIFICANT CHANGE UP (ref 2.5–4.5)
PHOSPHATE SERPL-MCNC: 3.7 MG/DL — SIGNIFICANT CHANGE UP (ref 2.5–4.5)
PHOSPHATE SERPL-MCNC: 3.8 MG/DL — SIGNIFICANT CHANGE UP (ref 2.5–4.5)
PHOSPHATE SERPL-MCNC: 4 MG/DL — SIGNIFICANT CHANGE UP (ref 2.5–4.5)
PHOSPHATE SERPL-MCNC: 4.2 MG/DL — SIGNIFICANT CHANGE UP (ref 2.5–4.5)
PHOSPHATE SERPL-MCNC: 4.4 MG/DL — SIGNIFICANT CHANGE UP (ref 2.5–4.5)
PHOSPHATE SERPL-MCNC: 4.4 MG/DL — SIGNIFICANT CHANGE UP (ref 2.5–4.5)
PHOSPHATE SERPL-MCNC: 4.6 MG/DL — HIGH (ref 2.5–4.5)
PHOSPHATE SERPL-MCNC: 4.6 MG/DL — HIGH (ref 2.5–4.5)
PHOSPHATE SERPL-MCNC: 4.7 MG/DL — HIGH (ref 2.5–4.5)
PHOSPHATE SERPL-MCNC: 4.7 MG/DL — HIGH (ref 2.5–4.5)
PHOSPHATE SERPL-MCNC: 4.9 MG/DL — HIGH (ref 2.5–4.5)
PHOSPHATE SERPL-MCNC: 5 MG/DL — HIGH (ref 2.5–4.5)
PHOSPHATE SERPL-MCNC: 5 MG/DL — HIGH (ref 2.5–4.5)
PLAT MORPH BLD: ABNORMAL
PLATELET # BLD AUTO: 104 K/UL — LOW (ref 150–400)
PLATELET # BLD AUTO: 105 K/UL — LOW (ref 150–400)
PLATELET # BLD AUTO: 106 K/UL — LOW (ref 150–400)
PLATELET # BLD AUTO: 115 K/UL — LOW (ref 150–400)
PLATELET # BLD AUTO: 115 K/UL — LOW (ref 150–400)
PLATELET # BLD AUTO: 118 K/UL — LOW (ref 150–400)
PLATELET # BLD AUTO: 123 K/UL — LOW (ref 150–400)
PLATELET # BLD AUTO: 125 K/UL — LOW (ref 150–400)
PLATELET # BLD AUTO: 127 K/UL — LOW (ref 150–400)
PLATELET # BLD AUTO: 127 K/UL — LOW (ref 150–400)
PLATELET # BLD AUTO: 137 K/UL — LOW (ref 150–400)
PLATELET # BLD AUTO: 138 K/UL — LOW (ref 150–400)
PLATELET # BLD AUTO: 140 K/UL — LOW (ref 150–400)
PLATELET # BLD AUTO: 141 K/UL — LOW (ref 150–400)
PLATELET # BLD AUTO: 152 K/UL — SIGNIFICANT CHANGE UP (ref 150–400)
PLATELET # BLD AUTO: 169 K/UL — SIGNIFICANT CHANGE UP (ref 150–400)
PLATELET # BLD AUTO: 180 K/UL — SIGNIFICANT CHANGE UP (ref 150–400)
PLATELET # BLD AUTO: 185 K/UL — SIGNIFICANT CHANGE UP (ref 150–400)
PLATELET # BLD AUTO: 193 K/UL — SIGNIFICANT CHANGE UP (ref 150–400)
PLATELET # BLD AUTO: 206 K/UL — SIGNIFICANT CHANGE UP (ref 150–400)
PLATELET # BLD AUTO: 222 K/UL — SIGNIFICANT CHANGE UP (ref 150–400)
PLATELET # BLD AUTO: 228 K/UL — SIGNIFICANT CHANGE UP (ref 150–400)
PLATELET # BLD AUTO: 230 K/UL — SIGNIFICANT CHANGE UP (ref 150–400)
PLATELET # BLD AUTO: 231 K/UL — SIGNIFICANT CHANGE UP (ref 150–400)
PLATELET # BLD AUTO: 237 K/UL — SIGNIFICANT CHANGE UP (ref 150–400)
PLATELET # BLD AUTO: 238 K/UL — SIGNIFICANT CHANGE UP (ref 150–400)
PLATELET # BLD AUTO: 240 K/UL — SIGNIFICANT CHANGE UP (ref 150–400)
PLATELET # BLD AUTO: 242 K/UL — SIGNIFICANT CHANGE UP (ref 150–400)
PLATELET # BLD AUTO: 243 K/UL — SIGNIFICANT CHANGE UP (ref 150–400)
PLATELET # BLD AUTO: 246 K/UL — SIGNIFICANT CHANGE UP (ref 150–400)
PLATELET # BLD AUTO: 250 K/UL — SIGNIFICANT CHANGE UP (ref 150–400)
PLATELET # BLD AUTO: 250 K/UL — SIGNIFICANT CHANGE UP (ref 150–400)
PLATELET # BLD AUTO: 252 K/UL — SIGNIFICANT CHANGE UP (ref 150–400)
PLATELET # BLD AUTO: 255 K/UL — SIGNIFICANT CHANGE UP (ref 150–400)
PLATELET # BLD AUTO: 256 K/UL — SIGNIFICANT CHANGE UP (ref 150–400)
PLATELET # BLD AUTO: 258 K/UL — SIGNIFICANT CHANGE UP (ref 150–400)
PLATELET # BLD AUTO: 264 K/UL — SIGNIFICANT CHANGE UP (ref 150–400)
PLATELET # BLD AUTO: 265 K/UL — SIGNIFICANT CHANGE UP (ref 150–400)
PLATELET # BLD AUTO: 267 K/UL — SIGNIFICANT CHANGE UP (ref 150–400)
PLATELET # BLD AUTO: 277 K/UL — SIGNIFICANT CHANGE UP (ref 150–400)
PLATELET # BLD AUTO: 279 K/UL — SIGNIFICANT CHANGE UP (ref 150–400)
PLATELET # BLD AUTO: 281 K/UL — SIGNIFICANT CHANGE UP (ref 150–400)
PLATELET # BLD AUTO: 285 K/UL — SIGNIFICANT CHANGE UP (ref 150–400)
PLATELET # BLD AUTO: 287 K/UL — SIGNIFICANT CHANGE UP (ref 150–400)
PLATELET # BLD AUTO: 293 K/UL — SIGNIFICANT CHANGE UP (ref 150–400)
PLATELET # BLD AUTO: 293 K/UL — SIGNIFICANT CHANGE UP (ref 150–400)
PLATELET # BLD AUTO: 297 K/UL — SIGNIFICANT CHANGE UP (ref 150–400)
PLATELET # BLD AUTO: 298 K/UL — SIGNIFICANT CHANGE UP (ref 150–400)
PLATELET # BLD AUTO: 299 K/UL — SIGNIFICANT CHANGE UP (ref 150–400)
PLATELET # BLD AUTO: 302 K/UL — SIGNIFICANT CHANGE UP (ref 150–400)
PLATELET # BLD AUTO: 320 K/UL — SIGNIFICANT CHANGE UP (ref 150–400)
PLATELET # BLD AUTO: 324 K/UL — SIGNIFICANT CHANGE UP (ref 150–400)
PLATELET # BLD AUTO: 96 K/UL — LOW (ref 150–400)
PO2 BLDA: 106 MMHG — SIGNIFICANT CHANGE UP (ref 83–108)
PO2 BLDA: 71 MMHG — LOW (ref 83–108)
PO2 BLDV: 141 MMHG — HIGH (ref 25–45)
PO2 BLDV: 31 MMHG — SIGNIFICANT CHANGE UP (ref 25–45)
PO2 BLDV: 35 MMHG — SIGNIFICANT CHANGE UP
PO2 BLDV: 38 MMHG — SIGNIFICANT CHANGE UP (ref 25–45)
PO2 BLDV: 40 MMHG — SIGNIFICANT CHANGE UP
PO2 BLDV: 44 MMHG — SIGNIFICANT CHANGE UP (ref 25–45)
PO2 BLDV: 45 MMHG — SIGNIFICANT CHANGE UP (ref 25–45)
PO2 BLDV: 73 MMHG — SIGNIFICANT CHANGE UP
POTASSIUM BLDV-SCNC: 3.2 MMOL/L — LOW (ref 3.5–5.1)
POTASSIUM BLDV-SCNC: 3.4 MMOL/L — LOW (ref 3.5–5.1)
POTASSIUM BLDV-SCNC: 3.4 MMOL/L — LOW (ref 3.5–5.1)
POTASSIUM BLDV-SCNC: 3.7 MMOL/L — SIGNIFICANT CHANGE UP (ref 3.5–5.1)
POTASSIUM BLDV-SCNC: 4 MMOL/L — SIGNIFICANT CHANGE UP (ref 3.5–5.1)
POTASSIUM BLDV-SCNC: 4 MMOL/L — SIGNIFICANT CHANGE UP (ref 3.5–5.1)
POTASSIUM BLDV-SCNC: 4.5 MMOL/L — SIGNIFICANT CHANGE UP (ref 3.5–5.1)
POTASSIUM BLDV-SCNC: 4.7 MMOL/L — SIGNIFICANT CHANGE UP (ref 3.5–5.1)
POTASSIUM SERPL-MCNC: 2.9 MMOL/L — CRITICAL LOW (ref 3.5–5.3)
POTASSIUM SERPL-MCNC: 3 MMOL/L — LOW (ref 3.5–5.3)
POTASSIUM SERPL-MCNC: 3.1 MMOL/L — LOW (ref 3.5–5.3)
POTASSIUM SERPL-MCNC: 3.2 MMOL/L — LOW (ref 3.5–5.3)
POTASSIUM SERPL-MCNC: 3.3 MMOL/L — LOW (ref 3.5–5.3)
POTASSIUM SERPL-MCNC: 3.4 MMOL/L — LOW (ref 3.5–5.3)
POTASSIUM SERPL-MCNC: 3.5 MMOL/L — SIGNIFICANT CHANGE UP (ref 3.5–5.3)
POTASSIUM SERPL-MCNC: 3.6 MMOL/L — SIGNIFICANT CHANGE UP (ref 3.5–5.3)
POTASSIUM SERPL-MCNC: 3.7 MMOL/L — SIGNIFICANT CHANGE UP (ref 3.5–5.3)
POTASSIUM SERPL-MCNC: 3.8 MMOL/L — SIGNIFICANT CHANGE UP (ref 3.5–5.3)
POTASSIUM SERPL-MCNC: 3.9 MMOL/L — SIGNIFICANT CHANGE UP (ref 3.5–5.3)
POTASSIUM SERPL-MCNC: 4 MMOL/L — SIGNIFICANT CHANGE UP (ref 3.5–5.3)
POTASSIUM SERPL-MCNC: 4.1 MMOL/L — SIGNIFICANT CHANGE UP (ref 3.5–5.3)
POTASSIUM SERPL-MCNC: 4.2 MMOL/L — SIGNIFICANT CHANGE UP (ref 3.5–5.3)
POTASSIUM SERPL-MCNC: 4.3 MMOL/L — SIGNIFICANT CHANGE UP (ref 3.5–5.3)
POTASSIUM SERPL-MCNC: 4.3 MMOL/L — SIGNIFICANT CHANGE UP (ref 3.5–5.3)
POTASSIUM SERPL-MCNC: 4.4 MMOL/L — SIGNIFICANT CHANGE UP (ref 3.5–5.3)
POTASSIUM SERPL-MCNC: 4.6 MMOL/L — SIGNIFICANT CHANGE UP (ref 3.5–5.3)
POTASSIUM SERPL-MCNC: 4.7 MMOL/L — SIGNIFICANT CHANGE UP (ref 3.5–5.3)
POTASSIUM SERPL-MCNC: 4.9 MMOL/L — SIGNIFICANT CHANGE UP (ref 3.5–5.3)
POTASSIUM SERPL-MCNC: 5 MMOL/L — SIGNIFICANT CHANGE UP (ref 3.5–5.3)
POTASSIUM SERPL-MCNC: 5.1 MMOL/L — SIGNIFICANT CHANGE UP (ref 3.5–5.3)
POTASSIUM SERPL-MCNC: 5.2 MMOL/L — SIGNIFICANT CHANGE UP (ref 3.5–5.3)
POTASSIUM SERPL-MCNC: 5.3 MMOL/L — SIGNIFICANT CHANGE UP (ref 3.5–5.3)
POTASSIUM SERPL-MCNC: 6.8 MMOL/L — CRITICAL HIGH (ref 3.5–5.3)
POTASSIUM SERPL-SCNC: 2.9 MMOL/L — CRITICAL LOW (ref 3.5–5.3)
POTASSIUM SERPL-SCNC: 3 MMOL/L — LOW (ref 3.5–5.3)
POTASSIUM SERPL-SCNC: 3.1 MMOL/L — LOW (ref 3.5–5.3)
POTASSIUM SERPL-SCNC: 3.2 MMOL/L — LOW (ref 3.5–5.3)
POTASSIUM SERPL-SCNC: 3.3 MMOL/L — LOW (ref 3.5–5.3)
POTASSIUM SERPL-SCNC: 3.4 MMOL/L — LOW (ref 3.5–5.3)
POTASSIUM SERPL-SCNC: 3.5 MMOL/L — SIGNIFICANT CHANGE UP (ref 3.5–5.3)
POTASSIUM SERPL-SCNC: 3.6 MMOL/L — SIGNIFICANT CHANGE UP (ref 3.5–5.3)
POTASSIUM SERPL-SCNC: 3.7 MMOL/L — SIGNIFICANT CHANGE UP (ref 3.5–5.3)
POTASSIUM SERPL-SCNC: 3.8 MMOL/L — SIGNIFICANT CHANGE UP (ref 3.5–5.3)
POTASSIUM SERPL-SCNC: 3.9 MMOL/L — SIGNIFICANT CHANGE UP (ref 3.5–5.3)
POTASSIUM SERPL-SCNC: 4 MMOL/L — SIGNIFICANT CHANGE UP (ref 3.5–5.3)
POTASSIUM SERPL-SCNC: 4.1 MMOL/L — SIGNIFICANT CHANGE UP (ref 3.5–5.3)
POTASSIUM SERPL-SCNC: 4.2 MMOL/L — SIGNIFICANT CHANGE UP (ref 3.5–5.3)
POTASSIUM SERPL-SCNC: 4.3 MMOL/L — SIGNIFICANT CHANGE UP (ref 3.5–5.3)
POTASSIUM SERPL-SCNC: 4.3 MMOL/L — SIGNIFICANT CHANGE UP (ref 3.5–5.3)
POTASSIUM SERPL-SCNC: 4.4 MMOL/L — SIGNIFICANT CHANGE UP (ref 3.5–5.3)
POTASSIUM SERPL-SCNC: 4.6 MMOL/L — SIGNIFICANT CHANGE UP (ref 3.5–5.3)
POTASSIUM SERPL-SCNC: 4.7 MMOL/L — SIGNIFICANT CHANGE UP (ref 3.5–5.3)
POTASSIUM SERPL-SCNC: 4.9 MMOL/L — SIGNIFICANT CHANGE UP (ref 3.5–5.3)
POTASSIUM SERPL-SCNC: 5 MMOL/L — SIGNIFICANT CHANGE UP (ref 3.5–5.3)
POTASSIUM SERPL-SCNC: 5.1 MMOL/L — SIGNIFICANT CHANGE UP (ref 3.5–5.3)
POTASSIUM SERPL-SCNC: 5.2 MMOL/L — SIGNIFICANT CHANGE UP (ref 3.5–5.3)
POTASSIUM SERPL-SCNC: 5.3 MMOL/L — SIGNIFICANT CHANGE UP (ref 3.5–5.3)
POTASSIUM SERPL-SCNC: 6.8 MMOL/L — CRITICAL HIGH (ref 3.5–5.3)
POTASSIUM UR-SCNC: 35 MMOL/L — SIGNIFICANT CHANGE UP
PROCALCITONIN SERPL-MCNC: 0.33 NG/ML — HIGH (ref 0.02–0.1)
PROCALCITONIN SERPL-MCNC: 0.37 NG/ML — HIGH (ref 0.02–0.1)
PROT ?TM UR-MCNC: <7 MG/DL — SIGNIFICANT CHANGE UP (ref 0–12)
PROT FLD-MCNC: 2.5 G/DL — SIGNIFICANT CHANGE UP
PROT SERPL-MCNC: 5.3 G/DL — LOW (ref 6–8.3)
PROT SERPL-MCNC: 5.6 G/DL — LOW (ref 6–8.3)
PROT SERPL-MCNC: 5.7 G/DL — LOW (ref 6–8.3)
PROT SERPL-MCNC: 5.9 G/DL — LOW (ref 6–8.3)
PROT SERPL-MCNC: 6 G/DL — SIGNIFICANT CHANGE UP (ref 6–8.3)
PROT UR-MCNC: ABNORMAL
PROT UR-MCNC: NEGATIVE — SIGNIFICANT CHANGE UP
PROT/CREAT UR-RTO: <0.2 RATIO — SIGNIFICANT CHANGE UP (ref 0–0.2)
PROTHROM AB SERPL-ACNC: 11.6 SEC — SIGNIFICANT CHANGE UP (ref 10.5–13.4)
PROTHROM AB SERPL-ACNC: 12.1 SEC — SIGNIFICANT CHANGE UP (ref 10.5–13.4)
PROTHROM AB SERPL-ACNC: 12.1 SEC — SIGNIFICANT CHANGE UP (ref 10.5–13.4)
PROTHROM AB SERPL-ACNC: 12.2 SEC — SIGNIFICANT CHANGE UP (ref 10.5–13.4)
PROTHROM AB SERPL-ACNC: 12.2 SEC — SIGNIFICANT CHANGE UP (ref 10.5–13.4)
PROTHROM AB SERPL-ACNC: 12.3 SEC — SIGNIFICANT CHANGE UP (ref 10.5–13.4)
PROTHROM AB SERPL-ACNC: 12.5 SEC — SIGNIFICANT CHANGE UP (ref 10.5–13.4)
PROTHROM AB SERPL-ACNC: 12.6 SEC — SIGNIFICANT CHANGE UP (ref 10.5–13.4)
PROTHROM AB SERPL-ACNC: 12.6 SEC — SIGNIFICANT CHANGE UP (ref 10.5–13.4)
PROTHROM AB SERPL-ACNC: 13.1 SEC — SIGNIFICANT CHANGE UP (ref 10.5–13.4)
PROTHROM AB SERPL-ACNC: 13.1 SEC — SIGNIFICANT CHANGE UP (ref 10.5–13.4)
PROTHROM AB SERPL-ACNC: 13.4 SEC — SIGNIFICANT CHANGE UP (ref 10.5–13.4)
PROTHROM AB SERPL-ACNC: 13.5 SEC — HIGH (ref 10.5–13.4)
PROTHROM AB SERPL-ACNC: 13.7 SEC — HIGH (ref 10.5–13.4)
PROTHROM AB SERPL-ACNC: 13.8 SEC — HIGH (ref 10.5–13.4)
PROTHROM AB SERPL-ACNC: 13.9 SEC — HIGH (ref 10.5–13.4)
PROTHROM AB SERPL-ACNC: 14.1 SEC — HIGH (ref 10.5–13.4)
PROTHROM AB SERPL-ACNC: 14.4 SEC — HIGH (ref 10.5–13.4)
PROTHROM AB SERPL-ACNC: 14.5 SEC — HIGH (ref 10.5–13.4)
PROTHROM AB SERPL-ACNC: 15 SEC — HIGH (ref 10.5–13.4)
PROTHROM AB SERPL-ACNC: 15.3 SEC — HIGH (ref 10.5–13.4)
PROTHROM AB SERPL-ACNC: 16.3 SEC — HIGH (ref 10.5–13.4)
PROTHROM AB SERPL-ACNC: 16.9 SEC — HIGH (ref 10.5–13.4)
PROTHROM AB SERPL-ACNC: 18.2 SEC — HIGH (ref 10.5–13.4)
PROTHROM AB SERPL-ACNC: 18.3 SEC — HIGH (ref 10.5–13.4)
PROTHROM AB SERPL-ACNC: 18.8 SEC — HIGH (ref 10.5–13.4)
PROTHROM AB SERPL-ACNC: 19 SEC — HIGH (ref 10.5–13.4)
PROTHROM AB SERPL-ACNC: 19.9 SEC — HIGH (ref 10.5–13.4)
PROTHROM AB SERPL-ACNC: 22.4 SEC — HIGH (ref 10.5–13.4)
PROTHROM AB SERPL-ACNC: 23.2 SEC — HIGH (ref 10.5–13.4)
PROTHROM AB SERPL-ACNC: 24 SEC — HIGH (ref 10.5–13.4)
PROTHROM AB SERPL-ACNC: 24.2 SEC — HIGH (ref 10.5–13.4)
PROTHROM AB SERPL-ACNC: 24.8 SEC — HIGH (ref 10.5–13.4)
PROTHROM AB SERPL-ACNC: 26.6 SEC — HIGH (ref 10.5–13.4)
PROTHROM AB SERPL-ACNC: 28.4 SEC — HIGH (ref 10.5–13.4)
PROTHROM AB SERPL-ACNC: 29.5 SEC — HIGH (ref 10.5–13.4)
PROTHROM AB SERPL-ACNC: 29.6 SEC — HIGH (ref 10.5–13.4)
PROTHROM AB SERPL-ACNC: 30.8 SEC — HIGH (ref 10.5–13.4)
PROTHROM AB SERPL-ACNC: 34.3 SEC — HIGH (ref 10.5–13.4)
PROTHROM AB SERPL-ACNC: 37.8 SEC — HIGH (ref 10.5–13.4)
PROTHROM AB SERPL-ACNC: 42.8 SEC — HIGH (ref 10.5–13.4)
PROTHROM AB SERPL-ACNC: 46.3 SEC — HIGH (ref 10.5–13.4)
PROTHROM AB SERPL-ACNC: 53.5 SEC — HIGH (ref 10.5–13.4)
PT BLD: 45.9
QUALITY CONTROL: YES
RAPID RVP RESULT: SIGNIFICANT CHANGE UP
RAPID RVP RESULT: SIGNIFICANT CHANGE UP
RBC # BLD: 2.74 M/UL — LOW (ref 4.2–5.8)
RBC # BLD: 2.85 M/UL — LOW (ref 4.2–5.8)
RBC # BLD: 2.86 M/UL — LOW (ref 4.2–5.8)
RBC # BLD: 2.9 M/UL — LOW (ref 4.2–5.8)
RBC # BLD: 2.92 M/UL — LOW (ref 4.2–5.8)
RBC # BLD: 2.93 M/UL — LOW (ref 4.2–5.8)
RBC # BLD: 2.96 M/UL — LOW (ref 4.2–5.8)
RBC # BLD: 2.97 M/UL — LOW (ref 4.2–5.8)
RBC # BLD: 2.98 M/UL — LOW (ref 4.2–5.8)
RBC # BLD: 2.98 M/UL — LOW (ref 4.2–5.8)
RBC # BLD: 2.99 M/UL — LOW (ref 4.2–5.8)
RBC # BLD: 3.04 M/UL — LOW (ref 4.2–5.8)
RBC # BLD: 3.04 M/UL — LOW (ref 4.2–5.8)
RBC # BLD: 3.05 M/UL — LOW (ref 4.2–5.8)
RBC # BLD: 3.07 M/UL — LOW (ref 4.2–5.8)
RBC # BLD: 3.1 M/UL — LOW (ref 4.2–5.8)
RBC # BLD: 3.12 M/UL — LOW (ref 4.2–5.8)
RBC # BLD: 3.12 M/UL — LOW (ref 4.2–5.8)
RBC # BLD: 3.14 M/UL — LOW (ref 4.2–5.8)
RBC # BLD: 3.16 M/UL — LOW (ref 4.2–5.8)
RBC # BLD: 3.16 M/UL — LOW (ref 4.2–5.8)
RBC # BLD: 3.19 M/UL — LOW (ref 4.2–5.8)
RBC # BLD: 3.2 M/UL — LOW (ref 4.2–5.8)
RBC # BLD: 3.21 M/UL — LOW (ref 4.2–5.8)
RBC # BLD: 3.25 M/UL — LOW (ref 4.2–5.8)
RBC # BLD: 3.29 M/UL — LOW (ref 4.2–5.8)
RBC # BLD: 3.31 M/UL — LOW (ref 4.2–5.8)
RBC # BLD: 3.31 M/UL — LOW (ref 4.2–5.8)
RBC # BLD: 3.32 M/UL — LOW (ref 4.2–5.8)
RBC # BLD: 3.32 M/UL — LOW (ref 4.2–5.8)
RBC # BLD: 3.35 M/UL — LOW (ref 4.2–5.8)
RBC # BLD: 3.37 M/UL — LOW (ref 4.2–5.8)
RBC # BLD: 3.38 M/UL — LOW (ref 4.2–5.8)
RBC # BLD: 3.38 M/UL — LOW (ref 4.2–5.8)
RBC # BLD: 3.48 M/UL — LOW (ref 4.2–5.8)
RBC # BLD: 3.52 M/UL — LOW (ref 4.2–5.8)
RBC # BLD: 3.54 M/UL — LOW (ref 4.2–5.8)
RBC # BLD: 3.55 M/UL — LOW (ref 4.2–5.8)
RBC # BLD: 3.55 M/UL — LOW (ref 4.2–5.8)
RBC # BLD: 3.58 M/UL — LOW (ref 4.2–5.8)
RBC # BLD: 3.59 M/UL — LOW (ref 4.2–5.8)
RBC # BLD: 3.61 M/UL — LOW (ref 4.2–5.8)
RBC # BLD: 3.74 M/UL — LOW (ref 4.2–5.8)
RBC # BLD: 3.74 M/UL — LOW (ref 4.2–5.8)
RBC # BLD: 3.75 M/UL — LOW (ref 4.2–5.8)
RBC # BLD: 3.9 M/UL — LOW (ref 4.2–5.8)
RBC # BLD: 4.31 M/UL — SIGNIFICANT CHANGE UP (ref 4.2–5.8)
RBC # BLD: 4.36 M/UL — SIGNIFICANT CHANGE UP (ref 4.2–5.8)
RBC # BLD: 4.41 M/UL — SIGNIFICANT CHANGE UP (ref 4.2–5.8)
RBC # FLD: 18.6 % — HIGH (ref 10.3–14.5)
RBC # FLD: 18.8 % — HIGH (ref 10.3–14.5)
RBC # FLD: 19.2 % — HIGH (ref 10.3–14.5)
RBC # FLD: 19.2 % — HIGH (ref 10.3–14.5)
RBC # FLD: 19.3 % — HIGH (ref 10.3–14.5)
RBC # FLD: 19.8 % — HIGH (ref 10.3–14.5)
RBC # FLD: 19.9 % — HIGH (ref 10.3–14.5)
RBC # FLD: 20 % — HIGH (ref 10.3–14.5)
RBC # FLD: 20.1 % — HIGH (ref 10.3–14.5)
RBC # FLD: 20.2 % — HIGH (ref 10.3–14.5)
RBC # FLD: 20.2 % — HIGH (ref 10.3–14.5)
RBC # FLD: 20.3 % — HIGH (ref 10.3–14.5)
RBC # FLD: 20.3 % — HIGH (ref 10.3–14.5)
RBC # FLD: 20.4 % — HIGH (ref 10.3–14.5)
RBC # FLD: 21 % — HIGH (ref 10.3–14.5)
RBC # FLD: 21.1 % — HIGH (ref 10.3–14.5)
RBC # FLD: 21.2 % — HIGH (ref 10.3–14.5)
RBC # FLD: 21.2 % — HIGH (ref 10.3–14.5)
RBC # FLD: 21.6 % — HIGH (ref 10.3–14.5)
RBC # FLD: 21.7 % — HIGH (ref 10.3–14.5)
RBC # FLD: 21.8 % — HIGH (ref 10.3–14.5)
RBC # FLD: 22.1 % — HIGH (ref 10.3–14.5)
RBC # FLD: 22.2 % — HIGH (ref 10.3–14.5)
RBC # FLD: 22.2 % — HIGH (ref 10.3–14.5)
RBC # FLD: 22.3 % — HIGH (ref 10.3–14.5)
RBC # FLD: 22.7 % — HIGH (ref 10.3–14.5)
RBC # FLD: 22.9 % — HIGH (ref 10.3–14.5)
RBC # FLD: 23 % — HIGH (ref 10.3–14.5)
RBC # FLD: 23.1 % — HIGH (ref 10.3–14.5)
RBC # FLD: 23.2 % — HIGH (ref 10.3–14.5)
RBC # FLD: 23.3 % — HIGH (ref 10.3–14.5)
RBC # FLD: 23.3 % — HIGH (ref 10.3–14.5)
RBC # FLD: 23.4 % — HIGH (ref 10.3–14.5)
RBC # FLD: 23.4 % — HIGH (ref 10.3–14.5)
RBC # FLD: 23.5 % — HIGH (ref 10.3–14.5)
RBC # FLD: 23.5 % — HIGH (ref 10.3–14.5)
RBC # FLD: 23.7 % — HIGH (ref 10.3–14.5)
RBC # FLD: 23.9 % — HIGH (ref 10.3–14.5)
RBC # FLD: 24.1 % — HIGH (ref 10.3–14.5)
RBC # FLD: 24.2 % — HIGH (ref 10.3–14.5)
RBC # FLD: 25.4 % — HIGH (ref 10.3–14.5)
RBC BLD AUTO: SIGNIFICANT CHANGE UP
RBC CASTS # UR COMP ASSIST: 12 /HPF — HIGH (ref 0–4)
RBC CASTS # UR COMP ASSIST: 2 /HPF — SIGNIFICANT CHANGE UP (ref 0–4)
RCV VOL RI: 8000 CELLS/UL — HIGH (ref 0–5)
RH IG SCN BLD-IMP: POSITIVE — SIGNIFICANT CHANGE UP
RSV RNA SPEC QL NAA+PROBE: SIGNIFICANT CHANGE UP
RV+EV RNA SPEC QL NAA+PROBE: SIGNIFICANT CHANGE UP
S AUREUS DNA NOSE QL NAA+PROBE: SIGNIFICANT CHANGE UP
SAO2 % BLDA: 97.1 % — SIGNIFICANT CHANGE UP (ref 94–98)
SAO2 % BLDA: 99.3 % — HIGH (ref 94–98)
SAO2 % BLDV: 45.9 % — LOW (ref 67–88)
SAO2 % BLDV: 49.6 % — SIGNIFICANT CHANGE UP
SAO2 % BLDV: 61.8 % — LOW (ref 67–88)
SAO2 % BLDV: 63.2 % — SIGNIFICANT CHANGE UP
SAO2 % BLDV: 72.2 % — SIGNIFICANT CHANGE UP (ref 67–88)
SAO2 % BLDV: 78.1 % — SIGNIFICANT CHANGE UP (ref 67–88)
SAO2 % BLDV: 95.3 % — SIGNIFICANT CHANGE UP
SAO2 % BLDV: 99.5 % — HIGH (ref 67–88)
SARS-COV-2 RNA SPEC QL NAA+PROBE: DETECTED
SARS-COV-2 RNA SPEC QL NAA+PROBE: DETECTED
SARS-COV-2 RNA SPEC QL NAA+PROBE: SIGNIFICANT CHANGE UP
SODIUM SERPL-SCNC: 136 MMOL/L — SIGNIFICANT CHANGE UP (ref 135–145)
SODIUM SERPL-SCNC: 137 MMOL/L — SIGNIFICANT CHANGE UP (ref 135–145)
SODIUM SERPL-SCNC: 137 MMOL/L — SIGNIFICANT CHANGE UP (ref 135–145)
SODIUM SERPL-SCNC: 138 MMOL/L — SIGNIFICANT CHANGE UP (ref 135–145)
SODIUM SERPL-SCNC: 139 MMOL/L — SIGNIFICANT CHANGE UP (ref 135–145)
SODIUM SERPL-SCNC: 140 MMOL/L — SIGNIFICANT CHANGE UP (ref 135–145)
SODIUM SERPL-SCNC: 141 MMOL/L — SIGNIFICANT CHANGE UP (ref 135–145)
SODIUM SERPL-SCNC: 142 MMOL/L — SIGNIFICANT CHANGE UP (ref 135–145)
SODIUM SERPL-SCNC: 143 MMOL/L — SIGNIFICANT CHANGE UP (ref 135–145)
SODIUM SERPL-SCNC: 144 MMOL/L — SIGNIFICANT CHANGE UP (ref 135–145)
SODIUM SERPL-SCNC: 145 MMOL/L — SIGNIFICANT CHANGE UP (ref 135–145)
SODIUM SERPL-SCNC: 146 MMOL/L — HIGH (ref 135–145)
SODIUM SERPL-SCNC: 147 MMOL/L — HIGH (ref 135–145)
SODIUM SERPL-SCNC: 148 MMOL/L — HIGH (ref 135–145)
SODIUM SERPL-SCNC: 149 MMOL/L — HIGH (ref 135–145)
SODIUM SERPL-SCNC: 149 MMOL/L — HIGH (ref 135–145)
SODIUM SERPL-SCNC: 152 MMOL/L — HIGH (ref 135–145)
SODIUM SERPL-SCNC: 152 MMOL/L — HIGH (ref 135–145)
SODIUM SERPL-SCNC: 153 MMOL/L — HIGH (ref 135–145)
SODIUM SERPL-SCNC: 153 MMOL/L — HIGH (ref 135–145)
SODIUM SERPL-SCNC: 154 MMOL/L — HIGH (ref 135–145)
SODIUM SERPL-SCNC: 155 MMOL/L — HIGH (ref 135–145)
SODIUM SERPL-SCNC: 156 MMOL/L — HIGH (ref 135–145)
SODIUM SERPL-SCNC: 157 MMOL/L — HIGH (ref 135–145)
SODIUM UR-SCNC: 22 MMOL/L — SIGNIFICANT CHANGE UP
SP GR SPEC: 1.01 — SIGNIFICANT CHANGE UP (ref 1.01–1.05)
SP GR SPEC: 1.02 — SIGNIFICANT CHANGE UP (ref 1.01–1.02)
SPECIMEN SOURCE: SIGNIFICANT CHANGE UP
T4 FREE SERPL-MCNC: 0.9 NG/DL — SIGNIFICANT CHANGE UP (ref 0.9–1.8)
TIBC SERPL-MCNC: 279 UG/DL — SIGNIFICANT CHANGE UP (ref 220–430)
TOTAL NUCLEATED CELL COUNT, BODY FLUID: 152 CELLS/UL — HIGH (ref 0–5)
TRANSFERRIN SERPL-MCNC: 199 MG/DL — LOW (ref 200–360)
TRIGL SERPL-MCNC: 107 MG/DL — SIGNIFICANT CHANGE UP
TROPONIN T, HIGH SENSITIVITY RESULT: 145 NG/L — HIGH (ref 0–51)
TROPONIN T, HIGH SENSITIVITY RESULT: 154 NG/L — HIGH (ref 0–51)
TROPONIN T, HIGH SENSITIVITY RESULT: 165 NG/L — HIGH (ref 0–51)
TROPONIN T, HIGH SENSITIVITY RESULT: 204 NG/L — HIGH (ref 0–51)
TROPONIN T, HIGH SENSITIVITY RESULT: 228 NG/L — CRITICAL HIGH
TROPONIN T, HIGH SENSITIVITY RESULT: 288 NG/L — CRITICAL HIGH
TROPONIN T, HIGH SENSITIVITY RESULT: 290 NG/L — CRITICAL HIGH
TSH SERPL-MCNC: 1.66 UIU/ML — SIGNIFICANT CHANGE UP (ref 0.27–4.2)
TSH SERPL-MCNC: 1.71 UIU/ML — SIGNIFICANT CHANGE UP (ref 0.27–4.2)
TUBE TYPE: SIGNIFICANT CHANGE UP
UIBC SERPL-MCNC: 258 UG/DL — SIGNIFICANT CHANGE UP (ref 110–370)
UROBILINOGEN FLD QL: NEGATIVE — SIGNIFICANT CHANGE UP
UROBILINOGEN FLD QL: SIGNIFICANT CHANGE UP
UUN UR-MCNC: 756 MG/DL — SIGNIFICANT CHANGE UP
WBC # BLD: 10.57 K/UL — HIGH (ref 3.8–10.5)
WBC # BLD: 10.58 K/UL — HIGH (ref 3.8–10.5)
WBC # BLD: 10.71 K/UL — HIGH (ref 3.8–10.5)
WBC # BLD: 10.93 K/UL — HIGH (ref 3.8–10.5)
WBC # BLD: 11.11 K/UL — HIGH (ref 3.8–10.5)
WBC # BLD: 11.13 K/UL — HIGH (ref 3.8–10.5)
WBC # BLD: 11.16 K/UL — HIGH (ref 3.8–10.5)
WBC # BLD: 11.82 K/UL — HIGH (ref 3.8–10.5)
WBC # BLD: 12.65 K/UL — HIGH (ref 3.8–10.5)
WBC # BLD: 13.12 K/UL — HIGH (ref 3.8–10.5)
WBC # BLD: 15.11 K/UL — HIGH (ref 3.8–10.5)
WBC # BLD: 15.28 K/UL — HIGH (ref 3.8–10.5)
WBC # BLD: 15.37 K/UL — HIGH (ref 3.8–10.5)
WBC # BLD: 17.02 K/UL — HIGH (ref 3.8–10.5)
WBC # BLD: 17.15 K/UL — HIGH (ref 3.8–10.5)
WBC # BLD: 17.75 K/UL — HIGH (ref 3.8–10.5)
WBC # BLD: 18.18 K/UL — HIGH (ref 3.8–10.5)
WBC # BLD: 19.73 K/UL — HIGH (ref 3.8–10.5)
WBC # BLD: 19.99 K/UL — HIGH (ref 3.8–10.5)
WBC # BLD: 20.97 K/UL — HIGH (ref 3.8–10.5)
WBC # BLD: 21.2 K/UL — HIGH (ref 3.8–10.5)
WBC # BLD: 21.4 K/UL — HIGH (ref 3.8–10.5)
WBC # BLD: 21.9 K/UL — HIGH (ref 3.8–10.5)
WBC # BLD: 22.51 K/UL — HIGH (ref 3.8–10.5)
WBC # BLD: 22.61 K/UL — HIGH (ref 3.8–10.5)
WBC # BLD: 22.85 K/UL — HIGH (ref 3.8–10.5)
WBC # BLD: 4.15 K/UL — SIGNIFICANT CHANGE UP (ref 3.8–10.5)
WBC # BLD: 4.38 K/UL — SIGNIFICANT CHANGE UP (ref 3.8–10.5)
WBC # BLD: 5.42 K/UL — SIGNIFICANT CHANGE UP (ref 3.8–10.5)
WBC # BLD: 5.64 K/UL — SIGNIFICANT CHANGE UP (ref 3.8–10.5)
WBC # BLD: 5.9 K/UL — SIGNIFICANT CHANGE UP (ref 3.8–10.5)
WBC # BLD: 6.03 K/UL — SIGNIFICANT CHANGE UP (ref 3.8–10.5)
WBC # BLD: 6.63 K/UL — SIGNIFICANT CHANGE UP (ref 3.8–10.5)
WBC # BLD: 6.97 K/UL — SIGNIFICANT CHANGE UP (ref 3.8–10.5)
WBC # BLD: 6.99 K/UL — SIGNIFICANT CHANGE UP (ref 3.8–10.5)
WBC # BLD: 7.27 K/UL — SIGNIFICANT CHANGE UP (ref 3.8–10.5)
WBC # BLD: 7.33 K/UL — SIGNIFICANT CHANGE UP (ref 3.8–10.5)
WBC # BLD: 7.44 K/UL — SIGNIFICANT CHANGE UP (ref 3.8–10.5)
WBC # BLD: 7.49 K/UL — SIGNIFICANT CHANGE UP (ref 3.8–10.5)
WBC # BLD: 7.61 K/UL — SIGNIFICANT CHANGE UP (ref 3.8–10.5)
WBC # BLD: 7.62 K/UL — SIGNIFICANT CHANGE UP (ref 3.8–10.5)
WBC # BLD: 7.69 K/UL — SIGNIFICANT CHANGE UP (ref 3.8–10.5)
WBC # BLD: 7.7 K/UL — SIGNIFICANT CHANGE UP (ref 3.8–10.5)
WBC # BLD: 7.73 K/UL — SIGNIFICANT CHANGE UP (ref 3.8–10.5)
WBC # BLD: 7.85 K/UL — SIGNIFICANT CHANGE UP (ref 3.8–10.5)
WBC # BLD: 8.18 K/UL — SIGNIFICANT CHANGE UP (ref 3.8–10.5)
WBC # BLD: 8.42 K/UL — SIGNIFICANT CHANGE UP (ref 3.8–10.5)
WBC # BLD: 8.66 K/UL — SIGNIFICANT CHANGE UP (ref 3.8–10.5)
WBC # BLD: 8.67 K/UL — SIGNIFICANT CHANGE UP (ref 3.8–10.5)
WBC # BLD: 9.15 K/UL — SIGNIFICANT CHANGE UP (ref 3.8–10.5)
WBC # BLD: 9.29 K/UL — SIGNIFICANT CHANGE UP (ref 3.8–10.5)
WBC # BLD: 9.32 K/UL — SIGNIFICANT CHANGE UP (ref 3.8–10.5)
WBC # BLD: 9.4 K/UL — SIGNIFICANT CHANGE UP (ref 3.8–10.5)
WBC # FLD AUTO: 10.57 K/UL — HIGH (ref 3.8–10.5)
WBC # FLD AUTO: 10.58 K/UL — HIGH (ref 3.8–10.5)
WBC # FLD AUTO: 10.71 K/UL — HIGH (ref 3.8–10.5)
WBC # FLD AUTO: 10.93 K/UL — HIGH (ref 3.8–10.5)
WBC # FLD AUTO: 11.11 K/UL — HIGH (ref 3.8–10.5)
WBC # FLD AUTO: 11.13 K/UL — HIGH (ref 3.8–10.5)
WBC # FLD AUTO: 11.16 K/UL — HIGH (ref 3.8–10.5)
WBC # FLD AUTO: 11.82 K/UL — HIGH (ref 3.8–10.5)
WBC # FLD AUTO: 12.65 K/UL — HIGH (ref 3.8–10.5)
WBC # FLD AUTO: 13.12 K/UL — HIGH (ref 3.8–10.5)
WBC # FLD AUTO: 15.11 K/UL — HIGH (ref 3.8–10.5)
WBC # FLD AUTO: 15.28 K/UL — HIGH (ref 3.8–10.5)
WBC # FLD AUTO: 15.37 K/UL — HIGH (ref 3.8–10.5)
WBC # FLD AUTO: 17.02 K/UL — HIGH (ref 3.8–10.5)
WBC # FLD AUTO: 17.15 K/UL — HIGH (ref 3.8–10.5)
WBC # FLD AUTO: 17.75 K/UL — HIGH (ref 3.8–10.5)
WBC # FLD AUTO: 18.18 K/UL — HIGH (ref 3.8–10.5)
WBC # FLD AUTO: 19.73 K/UL — HIGH (ref 3.8–10.5)
WBC # FLD AUTO: 19.99 K/UL — HIGH (ref 3.8–10.5)
WBC # FLD AUTO: 20.97 K/UL — HIGH (ref 3.8–10.5)
WBC # FLD AUTO: 21.2 K/UL — HIGH (ref 3.8–10.5)
WBC # FLD AUTO: 21.4 K/UL — HIGH (ref 3.8–10.5)
WBC # FLD AUTO: 21.9 K/UL — HIGH (ref 3.8–10.5)
WBC # FLD AUTO: 22.51 K/UL — HIGH (ref 3.8–10.5)
WBC # FLD AUTO: 22.61 K/UL — HIGH (ref 3.8–10.5)
WBC # FLD AUTO: 22.85 K/UL — HIGH (ref 3.8–10.5)
WBC # FLD AUTO: 4.15 K/UL — SIGNIFICANT CHANGE UP (ref 3.8–10.5)
WBC # FLD AUTO: 4.38 K/UL — SIGNIFICANT CHANGE UP (ref 3.8–10.5)
WBC # FLD AUTO: 5.42 K/UL — SIGNIFICANT CHANGE UP (ref 3.8–10.5)
WBC # FLD AUTO: 5.64 K/UL — SIGNIFICANT CHANGE UP (ref 3.8–10.5)
WBC # FLD AUTO: 5.9 K/UL — SIGNIFICANT CHANGE UP (ref 3.8–10.5)
WBC # FLD AUTO: 6.03 K/UL — SIGNIFICANT CHANGE UP (ref 3.8–10.5)
WBC # FLD AUTO: 6.63 K/UL — SIGNIFICANT CHANGE UP (ref 3.8–10.5)
WBC # FLD AUTO: 6.97 K/UL — SIGNIFICANT CHANGE UP (ref 3.8–10.5)
WBC # FLD AUTO: 6.99 K/UL — SIGNIFICANT CHANGE UP (ref 3.8–10.5)
WBC # FLD AUTO: 7.27 K/UL — SIGNIFICANT CHANGE UP (ref 3.8–10.5)
WBC # FLD AUTO: 7.33 K/UL — SIGNIFICANT CHANGE UP (ref 3.8–10.5)
WBC # FLD AUTO: 7.44 K/UL — SIGNIFICANT CHANGE UP (ref 3.8–10.5)
WBC # FLD AUTO: 7.49 K/UL — SIGNIFICANT CHANGE UP (ref 3.8–10.5)
WBC # FLD AUTO: 7.61 K/UL — SIGNIFICANT CHANGE UP (ref 3.8–10.5)
WBC # FLD AUTO: 7.62 K/UL — SIGNIFICANT CHANGE UP (ref 3.8–10.5)
WBC # FLD AUTO: 7.69 K/UL — SIGNIFICANT CHANGE UP (ref 3.8–10.5)
WBC # FLD AUTO: 7.7 K/UL — SIGNIFICANT CHANGE UP (ref 3.8–10.5)
WBC # FLD AUTO: 7.73 K/UL — SIGNIFICANT CHANGE UP (ref 3.8–10.5)
WBC # FLD AUTO: 7.85 K/UL — SIGNIFICANT CHANGE UP (ref 3.8–10.5)
WBC # FLD AUTO: 8.18 K/UL — SIGNIFICANT CHANGE UP (ref 3.8–10.5)
WBC # FLD AUTO: 8.42 K/UL — SIGNIFICANT CHANGE UP (ref 3.8–10.5)
WBC # FLD AUTO: 8.66 K/UL — SIGNIFICANT CHANGE UP (ref 3.8–10.5)
WBC # FLD AUTO: 8.67 K/UL — SIGNIFICANT CHANGE UP (ref 3.8–10.5)
WBC # FLD AUTO: 9.15 K/UL — SIGNIFICANT CHANGE UP (ref 3.8–10.5)
WBC # FLD AUTO: 9.29 K/UL — SIGNIFICANT CHANGE UP (ref 3.8–10.5)
WBC # FLD AUTO: 9.32 K/UL — SIGNIFICANT CHANGE UP (ref 3.8–10.5)
WBC # FLD AUTO: 9.4 K/UL — SIGNIFICANT CHANGE UP (ref 3.8–10.5)
WBC UR QL: 2 /HPF — SIGNIFICANT CHANGE UP (ref 0–5)
WBC UR QL: 99 /HPF — HIGH (ref 0–5)

## 2022-01-01 PROCEDURE — 82962 GLUCOSE BLOOD TEST: CPT

## 2022-01-01 PROCEDURE — 82570 ASSAY OF URINE CREATININE: CPT

## 2022-01-01 PROCEDURE — 82330 ASSAY OF CALCIUM: CPT

## 2022-01-01 PROCEDURE — 80053 COMPREHEN METABOLIC PANEL: CPT

## 2022-01-01 PROCEDURE — 80048 BASIC METABOLIC PNL TOTAL CA: CPT

## 2022-01-01 PROCEDURE — 84484 ASSAY OF TROPONIN QUANT: CPT

## 2022-01-01 PROCEDURE — 99221 1ST HOSP IP/OBS SF/LOW 40: CPT

## 2022-01-01 PROCEDURE — 71045 X-RAY EXAM CHEST 1 VIEW: CPT | Mod: 26

## 2022-01-01 PROCEDURE — 84295 ASSAY OF SERUM SODIUM: CPT

## 2022-01-01 PROCEDURE — 93306 TTE W/DOPPLER COMPLETE: CPT | Mod: 26

## 2022-01-01 PROCEDURE — 93296 REM INTERROG EVL PM/IDS: CPT

## 2022-01-01 PROCEDURE — 80061 LIPID PANEL: CPT

## 2022-01-01 PROCEDURE — 82803 BLOOD GASES ANY COMBINATION: CPT

## 2022-01-01 PROCEDURE — 83880 ASSAY OF NATRIURETIC PEPTIDE: CPT

## 2022-01-01 PROCEDURE — 92526 ORAL FUNCTION THERAPY: CPT

## 2022-01-01 PROCEDURE — 83874 ASSAY OF MYOGLOBIN: CPT

## 2022-01-01 PROCEDURE — 99232 SBSQ HOSP IP/OBS MODERATE 35: CPT

## 2022-01-01 PROCEDURE — 82553 CREATINE MB FRACTION: CPT

## 2022-01-01 PROCEDURE — 88307 TISSUE EXAM BY PATHOLOGIST: CPT

## 2022-01-01 PROCEDURE — 71250 CT THORAX DX C-: CPT | Mod: 26

## 2022-01-01 PROCEDURE — 99222 1ST HOSP IP/OBS MODERATE 55: CPT

## 2022-01-01 PROCEDURE — 82947 ASSAY GLUCOSE BLOOD QUANT: CPT

## 2022-01-01 PROCEDURE — 86901 BLOOD TYPING SEROLOGIC RH(D): CPT

## 2022-01-01 PROCEDURE — 71045 X-RAY EXAM CHEST 1 VIEW: CPT

## 2022-01-01 PROCEDURE — 99223 1ST HOSP IP/OBS HIGH 75: CPT

## 2022-01-01 PROCEDURE — 84132 ASSAY OF SERUM POTASSIUM: CPT

## 2022-01-01 PROCEDURE — 93971 EXTREMITY STUDY: CPT

## 2022-01-01 PROCEDURE — 95816 EEG AWAKE AND DROWSY: CPT

## 2022-01-01 PROCEDURE — 99222 1ST HOSP IP/OBS MODERATE 55: CPT | Mod: GC

## 2022-01-01 PROCEDURE — 97112 NEUROMUSCULAR REEDUCATION: CPT

## 2022-01-01 PROCEDURE — 88307 TISSUE EXAM BY PATHOLOGIST: CPT | Mod: 26

## 2022-01-01 PROCEDURE — 97110 THERAPEUTIC EXERCISES: CPT

## 2022-01-01 PROCEDURE — 84540 ASSAY OF URINE/UREA-N: CPT

## 2022-01-01 PROCEDURE — 93010 ELECTROCARDIOGRAM REPORT: CPT

## 2022-01-01 PROCEDURE — 99214 OFFICE O/P EST MOD 30 MIN: CPT

## 2022-01-01 PROCEDURE — 36430 TRANSFUSION BLD/BLD COMPNT: CPT

## 2022-01-01 PROCEDURE — 84133 ASSAY OF URINE POTASSIUM: CPT

## 2022-01-01 PROCEDURE — 84300 ASSAY OF URINE SODIUM: CPT

## 2022-01-01 PROCEDURE — 86850 RBC ANTIBODY SCREEN: CPT

## 2022-01-01 PROCEDURE — 99213 OFFICE O/P EST LOW 20 MIN: CPT

## 2022-01-01 PROCEDURE — 99285 EMERGENCY DEPT VISIT HI MDM: CPT

## 2022-01-01 PROCEDURE — 99232 SBSQ HOSP IP/OBS MODERATE 35: CPT | Mod: GC

## 2022-01-01 PROCEDURE — 82436 ASSAY OF URINE CHLORIDE: CPT

## 2022-01-01 PROCEDURE — 70450 CT HEAD/BRAIN W/O DYE: CPT

## 2022-01-01 PROCEDURE — 93005 ELECTROCARDIOGRAM TRACING: CPT

## 2022-01-01 PROCEDURE — 97530 THERAPEUTIC ACTIVITIES: CPT

## 2022-01-01 PROCEDURE — 70450 CT HEAD/BRAIN W/O DYE: CPT | Mod: 26

## 2022-01-01 PROCEDURE — U0005: CPT

## 2022-01-01 PROCEDURE — 84439 ASSAY OF FREE THYROXINE: CPT

## 2022-01-01 PROCEDURE — 99024 POSTOP FOLLOW-UP VISIT: CPT

## 2022-01-01 PROCEDURE — P9016: CPT

## 2022-01-01 PROCEDURE — 99291 CRITICAL CARE FIRST HOUR: CPT

## 2022-01-01 PROCEDURE — 83935 ASSAY OF URINE OSMOLALITY: CPT

## 2022-01-01 PROCEDURE — 97161 PT EVAL LOW COMPLEX 20 MIN: CPT

## 2022-01-01 PROCEDURE — 83036 HEMOGLOBIN GLYCOSYLATED A1C: CPT

## 2022-01-01 PROCEDURE — 85025 COMPLETE CBC W/AUTO DIFF WBC: CPT

## 2022-01-01 PROCEDURE — 84100 ASSAY OF PHOSPHORUS: CPT

## 2022-01-01 PROCEDURE — 85018 HEMOGLOBIN: CPT

## 2022-01-01 PROCEDURE — 71275 CT ANGIOGRAPHY CHEST: CPT | Mod: 26,MG

## 2022-01-01 PROCEDURE — C1769: CPT

## 2022-01-01 PROCEDURE — 87040 BLOOD CULTURE FOR BACTERIA: CPT

## 2022-01-01 PROCEDURE — U0003: CPT

## 2022-01-01 PROCEDURE — 93000 ELECTROCARDIOGRAM COMPLETE: CPT

## 2022-01-01 PROCEDURE — 27884 AMPUTATION FOLLOW-UP SURGERY: CPT | Mod: RT,58

## 2022-01-01 PROCEDURE — 83735 ASSAY OF MAGNESIUM: CPT

## 2022-01-01 PROCEDURE — 81001 URINALYSIS AUTO W/SCOPE: CPT

## 2022-01-01 PROCEDURE — 93306 TTE W/DOPPLER COMPLETE: CPT

## 2022-01-01 PROCEDURE — 93280 PM DEVICE PROGR EVAL DUAL: CPT

## 2022-01-01 PROCEDURE — 88112 CYTOPATH CELL ENHANCE TECH: CPT | Mod: 26

## 2022-01-01 PROCEDURE — 93294 REM INTERROG EVL PM/LDLS PM: CPT

## 2022-01-01 PROCEDURE — 85014 HEMATOCRIT: CPT

## 2022-01-01 PROCEDURE — 94640 AIRWAY INHALATION TREATMENT: CPT

## 2022-01-01 PROCEDURE — 82435 ASSAY OF BLOOD CHLORIDE: CPT

## 2022-01-01 PROCEDURE — 83605 ASSAY OF LACTIC ACID: CPT

## 2022-01-01 PROCEDURE — C9399: CPT

## 2022-01-01 PROCEDURE — 85027 COMPLETE CBC AUTOMATED: CPT

## 2022-01-01 PROCEDURE — 84145 PROCALCITONIN (PCT): CPT

## 2022-01-01 PROCEDURE — 88305 TISSUE EXAM BY PATHOLOGIST: CPT | Mod: 26

## 2022-01-01 PROCEDURE — 86923 COMPATIBILITY TEST ELECTRIC: CPT

## 2022-01-01 PROCEDURE — 99497 ADVNCD CARE PLAN 30 MIN: CPT | Mod: 25

## 2022-01-01 PROCEDURE — 84156 ASSAY OF PROTEIN URINE: CPT

## 2022-01-01 PROCEDURE — C8929: CPT

## 2022-01-01 PROCEDURE — G1004: CPT

## 2022-01-01 PROCEDURE — 92610 EVALUATE SWALLOWING FUNCTION: CPT

## 2022-01-01 PROCEDURE — 86900 BLOOD TYPING SEROLOGIC ABO: CPT

## 2022-01-01 PROCEDURE — 95816 EEG AWAKE AND DROWSY: CPT | Mod: 26

## 2022-01-01 PROCEDURE — 85610 PROTHROMBIN TIME: CPT

## 2022-01-01 PROCEDURE — 87640 STAPH A DNA AMP PROBE: CPT

## 2022-01-01 PROCEDURE — 97116 GAIT TRAINING THERAPY: CPT

## 2022-01-01 PROCEDURE — C1889: CPT

## 2022-01-01 PROCEDURE — 71250 CT THORAX DX C-: CPT

## 2022-01-01 PROCEDURE — 32555 ASPIRATE PLEURA W/ IMAGING: CPT

## 2022-01-01 PROCEDURE — 84443 ASSAY THYROID STIM HORMONE: CPT

## 2022-01-01 PROCEDURE — 99215 OFFICE O/P EST HI 40 MIN: CPT

## 2022-01-01 PROCEDURE — 99233 SBSQ HOSP IP/OBS HIGH 50: CPT

## 2022-01-01 PROCEDURE — 93010 ELECTROCARDIOGRAM REPORT: CPT | Mod: 76

## 2022-01-01 PROCEDURE — 73630 X-RAY EXAM OF FOOT: CPT | Mod: 26,LT

## 2022-01-01 PROCEDURE — 96374 THER/PROPH/DIAG INJ IV PUSH: CPT

## 2022-01-01 PROCEDURE — 36600 WITHDRAWAL OF ARTERIAL BLOOD: CPT

## 2022-01-01 PROCEDURE — 97168 OT RE-EVAL EST PLAN CARE: CPT

## 2022-01-01 PROCEDURE — 97606 NEG PRS WND THER DME>50 SQCM: CPT

## 2022-01-01 PROCEDURE — 36415 COLL VENOUS BLD VENIPUNCTURE: CPT

## 2022-01-01 PROCEDURE — 97167 OT EVAL HIGH COMPLEX 60 MIN: CPT

## 2022-01-01 PROCEDURE — 71275 CT ANGIOGRAPHY CHEST: CPT | Mod: MG

## 2022-01-01 PROCEDURE — 99233 SBSQ HOSP IP/OBS HIGH 50: CPT | Mod: GC

## 2022-01-01 PROCEDURE — 97535 SELF CARE MNGMENT TRAINING: CPT

## 2022-01-01 PROCEDURE — 99214 OFFICE O/P EST MOD 30 MIN: CPT | Mod: 24

## 2022-01-01 PROCEDURE — 85730 THROMBOPLASTIN TIME PARTIAL: CPT

## 2022-01-01 PROCEDURE — C9803: CPT

## 2022-01-01 PROCEDURE — 87641 MR-STAPH DNA AMP PROBE: CPT

## 2022-01-01 PROCEDURE — 93971 EXTREMITY STUDY: CPT | Mod: 26

## 2022-01-01 PROCEDURE — 81003 URINALYSIS AUTO W/O SCOPE: CPT

## 2022-01-01 PROCEDURE — 27882 AMPUTATION OF LOWER LEG: CPT | Mod: RT

## 2022-01-01 PROCEDURE — 82565 ASSAY OF CREATININE: CPT

## 2022-01-01 PROCEDURE — 0225U NFCT DS DNA&RNA 21 SARSCOV2: CPT

## 2022-01-01 PROCEDURE — 93971 EXTREMITY STUDY: CPT | Mod: 26,LT

## 2022-01-01 PROCEDURE — 97163 PT EVAL HIGH COMPLEX 45 MIN: CPT

## 2022-01-01 DEVICE — SURGICEL NU-KNIT 6 X 9": Type: IMPLANTABLE DEVICE | Site: RIGHT | Status: FUNCTIONAL

## 2022-01-01 DEVICE — SURGICEL FIBRILLAR 2 X 4": Type: IMPLANTABLE DEVICE | Site: RIGHT | Status: FUNCTIONAL

## 2022-01-01 DEVICE — BONE WAX 2.5GM: Type: IMPLANTABLE DEVICE | Site: RIGHT | Status: FUNCTIONAL

## 2022-01-01 DEVICE — KIT A-LINE 1LUM 20G X 12CM SAFE KIT: Type: IMPLANTABLE DEVICE | Site: RIGHT | Status: FUNCTIONAL

## 2022-01-01 RX ORDER — PANTOPRAZOLE SODIUM 20 MG/1
40 TABLET, DELAYED RELEASE ORAL
Refills: 0 | Status: DISCONTINUED | OUTPATIENT
Start: 2022-01-01 | End: 2022-01-01

## 2022-01-01 RX ORDER — CEFTRIAXONE 500 MG/1
INJECTION, POWDER, FOR SOLUTION INTRAMUSCULAR; INTRAVENOUS
Refills: 0 | Status: COMPLETED | OUTPATIENT
Start: 2022-01-01 | End: 2022-01-01

## 2022-01-01 RX ORDER — MULTIVIT-MIN/FERROUS GLUCONATE 9 MG/15 ML
1 LIQUID (ML) ORAL DAILY
Refills: 0 | Status: DISCONTINUED | OUTPATIENT
Start: 2022-01-01 | End: 2022-01-01

## 2022-01-01 RX ORDER — WARFARIN SODIUM 2.5 MG/1
5 TABLET ORAL ONCE
Refills: 0 | Status: DISCONTINUED | OUTPATIENT
Start: 2022-01-01 | End: 2022-01-01

## 2022-01-01 RX ORDER — INSULIN LISPRO 100/ML
14 VIAL (ML) SUBCUTANEOUS
Qty: 0 | Refills: 0 | DISCHARGE

## 2022-01-01 RX ORDER — MULTIVIT-MIN/FERROUS GLUCONATE 9 MG/15 ML
1 LIQUID (ML) ORAL
Qty: 0 | Refills: 0 | DISCHARGE
Start: 2022-01-01

## 2022-01-01 RX ORDER — INSULIN LISPRO 100/ML
15 VIAL (ML) SUBCUTANEOUS
Refills: 0 | Status: DISCONTINUED | OUTPATIENT
Start: 2022-01-01 | End: 2022-01-01

## 2022-01-01 RX ORDER — ASPIRIN/CALCIUM CARB/MAGNESIUM 324 MG
1 TABLET ORAL
Qty: 0 | Refills: 0 | DISCHARGE

## 2022-01-01 RX ORDER — SENNA PLUS 8.6 MG/1
2 TABLET ORAL
Qty: 0 | Refills: 0 | DISCHARGE
Start: 2022-01-01

## 2022-01-01 RX ORDER — ASPIRIN/CALCIUM CARB/MAGNESIUM 324 MG
1 TABLET ORAL
Qty: 0 | Refills: 0 | DISCHARGE
Start: 2022-01-01

## 2022-01-01 RX ORDER — ZINC OXIDE 200 MG/G
1 OINTMENT TOPICAL
Qty: 0 | Refills: 0 | DISCHARGE

## 2022-01-01 RX ORDER — ACETAMINOPHEN 500 MG
1000 TABLET ORAL ONCE
Refills: 0 | Status: COMPLETED | OUTPATIENT
Start: 2022-01-01 | End: 2022-01-01

## 2022-01-01 RX ORDER — POTASSIUM CHLORIDE 20 MEQ
10 PACKET (EA) ORAL ONCE
Refills: 0 | Status: COMPLETED | OUTPATIENT
Start: 2022-01-01 | End: 2022-01-01

## 2022-01-01 RX ORDER — FINASTERIDE 5 MG/1
1 TABLET, FILM COATED ORAL
Qty: 0 | Refills: 0 | DISCHARGE

## 2022-01-01 RX ORDER — POLYETHYLENE GLYCOL 3350 17 G/17G
17 POWDER, FOR SOLUTION ORAL DAILY
Refills: 0 | Status: DISCONTINUED | OUTPATIENT
Start: 2022-01-01 | End: 2022-01-01

## 2022-01-01 RX ORDER — FUROSEMIDE 40 MG
20 TABLET ORAL
Refills: 0 | Status: DISCONTINUED | OUTPATIENT
Start: 2022-01-01 | End: 2022-01-01

## 2022-01-01 RX ORDER — OXYCODONE AND ACETAMINOPHEN 5; 325 MG/1; MG/1
2 TABLET ORAL ONCE
Refills: 0 | Status: DISCONTINUED | OUTPATIENT
Start: 2022-01-01 | End: 2022-01-01

## 2022-01-01 RX ORDER — ENOXAPARIN SODIUM 100 MG/ML
90 INJECTION SUBCUTANEOUS EVERY 12 HOURS
Refills: 0 | Status: DISCONTINUED | OUTPATIENT
Start: 2022-01-01 | End: 2022-01-01

## 2022-01-01 RX ORDER — INSULIN LISPRO 100/ML
8 VIAL (ML) SUBCUTANEOUS
Refills: 0 | Status: DISCONTINUED | OUTPATIENT
Start: 2022-01-01 | End: 2022-01-01

## 2022-01-01 RX ORDER — INSULIN LISPRO 100/ML
1 VIAL (ML) SUBCUTANEOUS
Refills: 0 | Status: DISCONTINUED | OUTPATIENT
Start: 2022-01-01 | End: 2022-01-01

## 2022-01-01 RX ORDER — SODIUM,POTASSIUM PHOSPHATES 278-250MG
1 POWDER IN PACKET (EA) ORAL
Refills: 0 | Status: DISCONTINUED | OUTPATIENT
Start: 2022-01-01 | End: 2022-01-01

## 2022-01-01 RX ORDER — FINASTERIDE 5 MG/1
5 TABLET, FILM COATED ORAL DAILY
Refills: 0 | Status: DISCONTINUED | OUTPATIENT
Start: 2022-01-01 | End: 2022-01-01

## 2022-01-01 RX ORDER — SODIUM CHLORIDE 9 MG/ML
1000 INJECTION, SOLUTION INTRAVENOUS
Refills: 0 | Status: DISCONTINUED | OUTPATIENT
Start: 2022-01-01 | End: 2022-01-01

## 2022-01-01 RX ORDER — DEXTROSE 50 % IN WATER 50 %
15 SYRINGE (ML) INTRAVENOUS ONCE
Refills: 0 | Status: DISCONTINUED | OUTPATIENT
Start: 2022-01-01 | End: 2022-01-01

## 2022-01-01 RX ORDER — ROBINUL 0.2 MG/ML
1 INJECTION INTRAMUSCULAR; INTRAVENOUS
Refills: 0 | Status: DISCONTINUED | OUTPATIENT
Start: 2022-01-01 | End: 2022-01-01

## 2022-01-01 RX ORDER — PIPERACILLIN AND TAZOBACTAM 4; .5 G/20ML; G/20ML
3.38 INJECTION, POWDER, LYOPHILIZED, FOR SOLUTION INTRAVENOUS EVERY 8 HOURS
Refills: 0 | Status: DISCONTINUED | OUTPATIENT
Start: 2022-01-01 | End: 2022-01-01

## 2022-01-01 RX ORDER — WARFARIN SODIUM 2.5 MG/1
7.5 TABLET ORAL ONCE
Refills: 0 | Status: COMPLETED | OUTPATIENT
Start: 2022-01-01 | End: 2022-01-01

## 2022-01-01 RX ORDER — METOPROLOL TARTRATE 50 MG
25 TABLET ORAL DAILY
Refills: 0 | Status: DISCONTINUED | OUTPATIENT
Start: 2022-01-01 | End: 2022-01-01

## 2022-01-01 RX ORDER — INSULIN LISPRO 100/ML
13 VIAL (ML) SUBCUTANEOUS
Refills: 0 | Status: DISCONTINUED | OUTPATIENT
Start: 2022-01-01 | End: 2022-01-01

## 2022-01-01 RX ORDER — HEPARIN SODIUM 5000 [USP'U]/ML
1250 INJECTION INTRAVENOUS; SUBCUTANEOUS
Qty: 25000 | Refills: 0 | Status: DISCONTINUED | OUTPATIENT
Start: 2022-01-01 | End: 2022-01-01

## 2022-01-01 RX ORDER — SODIUM CHLORIDE 0.65 %
1 AEROSOL, SPRAY (ML) NASAL EVERY 4 HOURS
Refills: 0 | Status: DISCONTINUED | OUTPATIENT
Start: 2022-01-01 | End: 2022-01-01

## 2022-01-01 RX ORDER — INSULIN LISPRO 100/ML
VIAL (ML) SUBCUTANEOUS
Refills: 0 | Status: DISCONTINUED | OUTPATIENT
Start: 2022-01-01 | End: 2022-01-01

## 2022-01-01 RX ORDER — DEXTROSE 50 % IN WATER 50 %
25 SYRINGE (ML) INTRAVENOUS ONCE
Refills: 0 | Status: COMPLETED | OUTPATIENT
Start: 2022-01-01 | End: 2022-01-01

## 2022-01-01 RX ORDER — LEVOTHYROXINE SODIUM 125 MCG
25 TABLET ORAL DAILY
Refills: 0 | Status: DISCONTINUED | OUTPATIENT
Start: 2022-01-01 | End: 2022-01-01

## 2022-01-01 RX ORDER — FUROSEMIDE 40 MG
40 TABLET ORAL ONCE
Refills: 0 | Status: COMPLETED | OUTPATIENT
Start: 2022-01-01 | End: 2022-01-01

## 2022-01-01 RX ORDER — WARFARIN SODIUM 2.5 MG/1
5 TABLET ORAL ONCE
Refills: 0 | Status: COMPLETED | OUTPATIENT
Start: 2022-01-01 | End: 2022-01-01

## 2022-01-01 RX ORDER — GLUCAGON INJECTION, SOLUTION 0.5 MG/.1ML
1 INJECTION, SOLUTION SUBCUTANEOUS ONCE
Refills: 0 | Status: DISCONTINUED | OUTPATIENT
Start: 2022-01-01 | End: 2022-01-01

## 2022-01-01 RX ORDER — PANTOPRAZOLE SODIUM 20 MG/1
1 TABLET, DELAYED RELEASE ORAL
Qty: 0 | Refills: 0 | DISCHARGE
Start: 2022-01-01

## 2022-01-01 RX ORDER — OXYCODONE HYDROCHLORIDE 5 MG/1
5 TABLET ORAL EVERY 6 HOURS
Refills: 0 | Status: DISCONTINUED | OUTPATIENT
Start: 2022-01-01 | End: 2022-01-01

## 2022-01-01 RX ORDER — FUROSEMIDE 40 MG
1 TABLET ORAL
Qty: 0 | Refills: 0 | DISCHARGE

## 2022-01-01 RX ORDER — INSULIN GLARGINE 100 [IU]/ML
40 INJECTION, SOLUTION SUBCUTANEOUS AT BEDTIME
Refills: 0 | Status: DISCONTINUED | OUTPATIENT
Start: 2022-01-01 | End: 2022-01-01

## 2022-01-01 RX ORDER — INSULIN LISPRO 100/ML
20 VIAL (ML) SUBCUTANEOUS
Refills: 0 | Status: DISCONTINUED | OUTPATIENT
Start: 2022-01-01 | End: 2022-01-01

## 2022-01-01 RX ORDER — PEN NEEDLE, DIABETIC 32GX 5/32"
32G X 6 MM NEEDLE, DISPOSABLE MISCELLANEOUS
Qty: 100 | Refills: 0 | Status: COMPLETED | COMMUNITY
Start: 2021-01-22 | End: 2022-01-01

## 2022-01-01 RX ORDER — IPRATROPIUM/ALBUTEROL SULFATE 18-103MCG
3 AEROSOL WITH ADAPTER (GRAM) INHALATION EVERY 6 HOURS
Refills: 0 | Status: DISCONTINUED | OUTPATIENT
Start: 2022-01-01 | End: 2022-01-01

## 2022-01-01 RX ORDER — FUROSEMIDE 40 MG
40 TABLET ORAL EVERY 12 HOURS
Refills: 0 | Status: DISCONTINUED | OUTPATIENT
Start: 2022-01-01 | End: 2022-01-01

## 2022-01-01 RX ORDER — INSULIN LISPRO 100/ML
14 VIAL (ML) SUBCUTANEOUS
Refills: 0 | Status: DISCONTINUED | OUTPATIENT
Start: 2022-01-01 | End: 2022-01-01

## 2022-01-01 RX ORDER — CADEXOMER IODINE 0.9 %
1 PADS, MEDICATED (EA) TOPICAL DAILY
Refills: 0 | Status: DISCONTINUED | OUTPATIENT
Start: 2022-01-01 | End: 2022-01-01

## 2022-01-01 RX ORDER — MAGNESIUM SULFATE 500 MG/ML
1 VIAL (ML) INJECTION ONCE
Refills: 0 | Status: COMPLETED | OUTPATIENT
Start: 2022-01-01 | End: 2022-01-01

## 2022-01-01 RX ORDER — FUROSEMIDE 40 MG
80 TABLET ORAL
Refills: 0 | Status: DISCONTINUED | OUTPATIENT
Start: 2022-01-01 | End: 2022-01-01

## 2022-01-01 RX ORDER — HYDROMORPHONE HYDROCHLORIDE 2 MG/ML
0.5 INJECTION INTRAMUSCULAR; INTRAVENOUS; SUBCUTANEOUS ONCE
Refills: 0 | Status: DISCONTINUED | OUTPATIENT
Start: 2022-01-01 | End: 2022-01-01

## 2022-01-01 RX ORDER — WARFARIN SODIUM 2.5 MG/1
10 TABLET ORAL ONCE
Refills: 0 | Status: COMPLETED | OUTPATIENT
Start: 2022-01-01 | End: 2022-01-01

## 2022-01-01 RX ORDER — INSULIN LISPRO 100/ML
5 VIAL (ML) SUBCUTANEOUS
Refills: 0 | Status: DISCONTINUED | OUTPATIENT
Start: 2022-01-01 | End: 2022-01-01

## 2022-01-01 RX ORDER — METOPROLOL TARTRATE 50 MG
25 TABLET ORAL ONCE
Refills: 0 | Status: COMPLETED | OUTPATIENT
Start: 2022-01-01 | End: 2022-01-01

## 2022-01-01 RX ORDER — INSULIN LISPRO 100/ML
VIAL (ML) SUBCUTANEOUS
Refills: 0 | Status: ACTIVE | OUTPATIENT
Start: 2022-01-01 | End: 2023-08-13

## 2022-01-01 RX ORDER — INSULIN GLARGINE 100 [IU]/ML
10 INJECTION, SOLUTION SUBCUTANEOUS AT BEDTIME
Refills: 0 | Status: DISCONTINUED | OUTPATIENT
Start: 2022-01-01 | End: 2022-01-01

## 2022-01-01 RX ORDER — TELMISARTAN 80 MG/1
80 TABLET ORAL DAILY
Qty: 90 | Refills: 1 | Status: ACTIVE | COMMUNITY
Start: 2019-02-25 | End: 1900-01-01

## 2022-01-01 RX ORDER — TAMSULOSIN HYDROCHLORIDE 0.4 MG/1
0.8 CAPSULE ORAL AT BEDTIME
Refills: 0 | Status: DISCONTINUED | OUTPATIENT
Start: 2022-01-01 | End: 2022-01-01

## 2022-01-01 RX ORDER — INSULIN LISPRO 100/ML
10 VIAL (ML) SUBCUTANEOUS
Refills: 0 | Status: DISCONTINUED | OUTPATIENT
Start: 2022-01-01 | End: 2022-01-01

## 2022-01-01 RX ORDER — POTASSIUM CHLORIDE 20 MEQ
40 PACKET (EA) ORAL EVERY 4 HOURS
Refills: 0 | Status: COMPLETED | OUTPATIENT
Start: 2022-01-01 | End: 2022-01-01

## 2022-01-01 RX ORDER — METOPROLOL TARTRATE 50 MG
25 TABLET ORAL
Refills: 0 | Status: DISCONTINUED | OUTPATIENT
Start: 2022-01-01 | End: 2022-01-01

## 2022-01-01 RX ORDER — FUROSEMIDE 40 MG
15 TABLET ORAL ONCE
Refills: 0 | Status: DISCONTINUED | OUTPATIENT
Start: 2022-01-01 | End: 2022-01-01

## 2022-01-01 RX ORDER — SODIUM CHLORIDE 9 MG/ML
4 INJECTION INTRAMUSCULAR; INTRAVENOUS; SUBCUTANEOUS THREE TIMES A DAY
Refills: 0 | Status: DISCONTINUED | OUTPATIENT
Start: 2022-01-01 | End: 2022-01-01

## 2022-01-01 RX ORDER — BACITRACIN ZINC 500 UNIT/G
1 OINTMENT IN PACKET (EA) TOPICAL
Qty: 0 | Refills: 0 | DISCHARGE

## 2022-01-01 RX ORDER — ALBUTEROL 90 UG/1
2 AEROSOL, METERED ORAL EVERY 6 HOURS
Refills: 0 | Status: DISCONTINUED | OUTPATIENT
Start: 2022-01-01 | End: 2022-01-01

## 2022-01-01 RX ORDER — POTASSIUM PHOSPHATE, MONOBASIC POTASSIUM PHOSPHATE, DIBASIC 236; 224 MG/ML; MG/ML
30 INJECTION, SOLUTION INTRAVENOUS ONCE
Refills: 0 | Status: COMPLETED | OUTPATIENT
Start: 2022-01-01 | End: 2022-01-01

## 2022-01-01 RX ORDER — MAGNESIUM SULFATE 500 MG/ML
2 VIAL (ML) INJECTION ONCE
Refills: 0 | Status: COMPLETED | OUTPATIENT
Start: 2022-01-01 | End: 2022-01-01

## 2022-01-01 RX ORDER — INSULIN LISPRO 100/ML
8 VIAL (ML) SUBCUTANEOUS ONCE
Refills: 0 | Status: COMPLETED | OUTPATIENT
Start: 2022-01-01 | End: 2022-01-01

## 2022-01-01 RX ORDER — OXYCODONE HYDROCHLORIDE 5 MG/1
2.5 TABLET ORAL ONCE
Refills: 0 | Status: DISCONTINUED | OUTPATIENT
Start: 2022-01-01 | End: 2022-01-01

## 2022-01-01 RX ORDER — IPRATROPIUM/ALBUTEROL SULFATE 18-103MCG
3 AEROSOL WITH ADAPTER (GRAM) INHALATION
Refills: 0 | Status: DISCONTINUED | OUTPATIENT
Start: 2022-01-01 | End: 2022-01-01

## 2022-01-01 RX ORDER — ASPIRIN/CALCIUM CARB/MAGNESIUM 324 MG
81 TABLET ORAL DAILY
Refills: 0 | Status: DISCONTINUED | OUTPATIENT
Start: 2022-01-01 | End: 2022-01-01

## 2022-01-01 RX ORDER — DEXTROSE 50 % IN WATER 50 %
12.5 SYRINGE (ML) INTRAVENOUS ONCE
Refills: 0 | Status: DISCONTINUED | OUTPATIENT
Start: 2022-01-01 | End: 2022-01-01

## 2022-01-01 RX ORDER — INSULIN GLARGINE 100 [IU]/ML
26 INJECTION, SOLUTION SUBCUTANEOUS AT BEDTIME
Refills: 0 | Status: DISCONTINUED | OUTPATIENT
Start: 2022-01-01 | End: 2022-01-01

## 2022-01-01 RX ORDER — CEFTRIAXONE 500 MG/1
1000 INJECTION, POWDER, FOR SOLUTION INTRAMUSCULAR; INTRAVENOUS EVERY 24 HOURS
Refills: 0 | Status: DISCONTINUED | OUTPATIENT
Start: 2022-01-01 | End: 2022-01-01

## 2022-01-01 RX ORDER — TAMSULOSIN HYDROCHLORIDE 0.4 MG/1
1 CAPSULE ORAL
Qty: 0 | Refills: 0 | DISCHARGE

## 2022-01-01 RX ORDER — INSULIN GLARGINE 100 [IU]/ML
37 INJECTION, SOLUTION SUBCUTANEOUS AT BEDTIME
Refills: 0 | Status: DISCONTINUED | OUTPATIENT
Start: 2022-01-01 | End: 2022-01-01

## 2022-01-01 RX ORDER — METOPROLOL TARTRATE 50 MG
1 TABLET ORAL
Qty: 0 | Refills: 0 | DISCHARGE

## 2022-01-01 RX ORDER — FUROSEMIDE 40 MG
40 TABLET ORAL
Refills: 0 | Status: DISCONTINUED | OUTPATIENT
Start: 2022-01-01 | End: 2022-01-01

## 2022-01-01 RX ORDER — GABAPENTIN 400 MG/1
100 CAPSULE ORAL ONCE
Refills: 0 | Status: COMPLETED | OUTPATIENT
Start: 2022-01-01 | End: 2022-01-01

## 2022-01-01 RX ORDER — OXYCODONE HYDROCHLORIDE 5 MG/1
5 TABLET ORAL ONCE
Refills: 0 | Status: DISCONTINUED | OUTPATIENT
Start: 2022-01-01 | End: 2022-01-01

## 2022-01-01 RX ORDER — POTASSIUM CHLORIDE 20 MEQ
40 PACKET (EA) ORAL ONCE
Refills: 0 | Status: COMPLETED | OUTPATIENT
Start: 2022-01-01 | End: 2022-01-01

## 2022-01-01 RX ORDER — INSULIN LISPRO 100/ML
VIAL (ML) SUBCUTANEOUS AT BEDTIME
Refills: 0 | Status: DISCONTINUED | OUTPATIENT
Start: 2022-01-01 | End: 2022-01-01

## 2022-01-01 RX ORDER — INSULIN GLARGINE 100 [IU]/ML
30 INJECTION, SOLUTION SUBCUTANEOUS AT BEDTIME
Refills: 0 | Status: DISCONTINUED | OUTPATIENT
Start: 2022-01-01 | End: 2022-01-01

## 2022-01-01 RX ORDER — AZITHROMYCIN 500 MG/1
500 TABLET, FILM COATED ORAL ONCE
Refills: 0 | Status: COMPLETED | OUTPATIENT
Start: 2022-01-01 | End: 2022-01-01

## 2022-01-01 RX ORDER — INSULIN HUMAN 100 [IU]/ML
3 INJECTION, SOLUTION SUBCUTANEOUS ONCE
Refills: 0 | Status: COMPLETED | OUTPATIENT
Start: 2022-01-01 | End: 2022-01-01

## 2022-01-01 RX ORDER — POTASSIUM CHLORIDE 20 MEQ
10 PACKET (EA) ORAL
Refills: 0 | Status: COMPLETED | OUTPATIENT
Start: 2022-01-01 | End: 2022-01-01

## 2022-01-01 RX ORDER — GABAPENTIN 400 MG/1
300 CAPSULE ORAL EVERY 8 HOURS
Refills: 0 | Status: DISCONTINUED | OUTPATIENT
Start: 2022-01-01 | End: 2022-01-01

## 2022-01-01 RX ORDER — LIDOCAINE HCL 20 MG/ML
10 VIAL (ML) INJECTION ONCE
Refills: 0 | Status: DISCONTINUED | OUTPATIENT
Start: 2022-01-01 | End: 2022-01-01

## 2022-01-01 RX ORDER — PANTOPRAZOLE SODIUM 20 MG/1
1 TABLET, DELAYED RELEASE ORAL
Qty: 0 | Refills: 0 | DISCHARGE

## 2022-01-01 RX ORDER — FUROSEMIDE 40 MG
40 TABLET ORAL EVERY 24 HOURS
Refills: 0 | Status: DISCONTINUED | OUTPATIENT
Start: 2022-01-01 | End: 2022-01-01

## 2022-01-01 RX ORDER — BUDESONIDE, MICRONIZED 100 %
0.5 POWDER (GRAM) MISCELLANEOUS
Refills: 0 | Status: DISCONTINUED | OUTPATIENT
Start: 2022-01-01 | End: 2022-01-01

## 2022-01-01 RX ORDER — DEXTROSE 50 % IN WATER 50 %
12.5 SYRINGE (ML) INTRAVENOUS ONCE
Refills: 0 | Status: COMPLETED | OUTPATIENT
Start: 2022-01-01 | End: 2022-01-01

## 2022-01-01 RX ORDER — DEXTROSE 50 % IN WATER 50 %
25 SYRINGE (ML) INTRAVENOUS ONCE
Refills: 0 | Status: DISCONTINUED | OUTPATIENT
Start: 2022-01-01 | End: 2022-01-01

## 2022-01-01 RX ORDER — TELMISARTAN 20 MG/1
1 TABLET ORAL
Qty: 0 | Refills: 0 | DISCHARGE

## 2022-01-01 RX ORDER — ACETAMINOPHEN 500 MG
650 TABLET ORAL EVERY 6 HOURS
Refills: 0 | Status: DISCONTINUED | OUTPATIENT
Start: 2022-01-01 | End: 2022-01-01

## 2022-01-01 RX ORDER — FUROSEMIDE 40 MG
20 TABLET ORAL ONCE
Refills: 0 | Status: COMPLETED | OUTPATIENT
Start: 2022-01-01 | End: 2022-01-01

## 2022-01-01 RX ORDER — INSULIN LISPRO 100/ML
12 VIAL (ML) SUBCUTANEOUS
Qty: 0 | Refills: 0 | DISCHARGE
Start: 2022-01-01

## 2022-01-01 RX ORDER — CEFTRIAXONE 500 MG/1
1000 INJECTION, POWDER, FOR SOLUTION INTRAMUSCULAR; INTRAVENOUS ONCE
Refills: 0 | Status: COMPLETED | OUTPATIENT
Start: 2022-01-01 | End: 2022-01-01

## 2022-01-01 RX ORDER — POLYETHYLENE GLYCOL 3350 17 G/17G
17 POWDER, FOR SOLUTION ORAL EVERY 12 HOURS
Refills: 0 | Status: DISCONTINUED | OUTPATIENT
Start: 2022-01-01 | End: 2022-01-01

## 2022-01-01 RX ORDER — WARFARIN SODIUM 2.5 MG/1
1 TABLET ORAL
Qty: 0 | Refills: 0 | DISCHARGE

## 2022-01-01 RX ORDER — INSULIN LISPRO 100/ML
3 VIAL (ML) SUBCUTANEOUS
Qty: 0 | Refills: 0 | DISCHARGE

## 2022-01-01 RX ORDER — OMEPRAZOLE 10 MG/1
1 CAPSULE, DELAYED RELEASE ORAL
Qty: 0 | Refills: 0 | DISCHARGE

## 2022-01-01 RX ORDER — INSULIN GLARGINE 100 [IU]/ML
22 INJECTION, SOLUTION SUBCUTANEOUS
Qty: 0 | Refills: 0 | DISCHARGE

## 2022-01-01 RX ORDER — PIPERACILLIN AND TAZOBACTAM 4; .5 G/20ML; G/20ML
3.38 INJECTION, POWDER, LYOPHILIZED, FOR SOLUTION INTRAVENOUS ONCE
Refills: 0 | Status: COMPLETED | OUTPATIENT
Start: 2022-01-01 | End: 2022-01-01

## 2022-01-01 RX ORDER — INSULIN LISPRO 100/ML
14 VIAL (ML) SUBCUTANEOUS
Qty: 0 | Refills: 0 | DISCHARGE
Start: 2022-01-01

## 2022-01-01 RX ORDER — ACETAMINOPHEN 500 MG
975 TABLET ORAL EVERY 8 HOURS
Refills: 0 | Status: DISCONTINUED | OUTPATIENT
Start: 2022-01-01 | End: 2022-01-01

## 2022-01-01 RX ORDER — POLYETHYLENE GLYCOL 3350 17 G/17G
17 POWDER, FOR SOLUTION ORAL
Qty: 0 | Refills: 0 | DISCHARGE
Start: 2022-01-01

## 2022-01-01 RX ORDER — GABAPENTIN 400 MG/1
100 CAPSULE ORAL EVERY 12 HOURS
Refills: 0 | Status: DISCONTINUED | OUTPATIENT
Start: 2022-01-01 | End: 2022-01-01

## 2022-01-01 RX ORDER — HEPARIN SODIUM 5000 [USP'U]/ML
1550 INJECTION INTRAVENOUS; SUBCUTANEOUS
Qty: 25000 | Refills: 0 | Status: DISCONTINUED | OUTPATIENT
Start: 2022-01-01 | End: 2022-01-01

## 2022-01-01 RX ORDER — WARFARIN SODIUM 2.5 MG/1
5 TABLET ORAL AT BEDTIME
Refills: 0 | Status: COMPLETED | OUTPATIENT
Start: 2022-01-01 | End: 2022-01-01

## 2022-01-01 RX ORDER — ACETYLCYSTEINE 200 MG/ML
4 VIAL (ML) MISCELLANEOUS THREE TIMES A DAY
Refills: 0 | Status: DISCONTINUED | OUTPATIENT
Start: 2022-01-01 | End: 2022-01-01

## 2022-01-01 RX ORDER — TRAMADOL HYDROCHLORIDE 50 MG/1
50 TABLET ORAL EVERY 12 HOURS
Refills: 0 | Status: DISCONTINUED | OUTPATIENT
Start: 2022-01-01 | End: 2022-01-01

## 2022-01-01 RX ORDER — GABAPENTIN 400 MG/1
600 CAPSULE ORAL EVERY 8 HOURS
Refills: 0 | Status: DISCONTINUED | OUTPATIENT
Start: 2022-01-01 | End: 2022-01-01

## 2022-01-01 RX ORDER — TRAMADOL HYDROCHLORIDE 50 MG/1
25 TABLET ORAL EVERY 4 HOURS
Refills: 0 | Status: DISCONTINUED | OUTPATIENT
Start: 2022-01-01 | End: 2022-01-01

## 2022-01-01 RX ORDER — INSULIN LISPRO 100/ML
3 VIAL (ML) SUBCUTANEOUS
Refills: 0 | Status: DISCONTINUED | OUTPATIENT
Start: 2022-01-01 | End: 2022-01-01

## 2022-01-01 RX ORDER — CEFTRIAXONE 500 MG/1
INJECTION, POWDER, FOR SOLUTION INTRAMUSCULAR; INTRAVENOUS
Refills: 0 | Status: DISCONTINUED | OUTPATIENT
Start: 2022-01-01 | End: 2022-01-01

## 2022-01-01 RX ORDER — POTASSIUM CHLORIDE 20 MEQ
10 PACKET (EA) ORAL
Refills: 0 | Status: DISCONTINUED | OUTPATIENT
Start: 2022-01-01 | End: 2022-01-01

## 2022-01-01 RX ORDER — INSULIN LISPRO 100/ML
2 VIAL (ML) SUBCUTANEOUS
Refills: 0 | Status: DISCONTINUED | OUTPATIENT
Start: 2022-01-01 | End: 2022-01-01

## 2022-01-01 RX ORDER — AZITHROMYCIN 500 MG/1
TABLET, FILM COATED ORAL
Refills: 0 | Status: DISCONTINUED | OUTPATIENT
Start: 2022-01-01 | End: 2022-01-01

## 2022-01-01 RX ORDER — IPRATROPIUM/ALBUTEROL SULFATE 18-103MCG
3 AEROSOL WITH ADAPTER (GRAM) INHALATION ONCE
Refills: 0 | Status: COMPLETED | OUTPATIENT
Start: 2022-01-01 | End: 2022-01-01

## 2022-01-01 RX ORDER — FENTANYL CITRATE 50 UG/ML
50 INJECTION INTRAVENOUS
Refills: 0 | Status: DISCONTINUED | OUTPATIENT
Start: 2022-01-01 | End: 2022-01-01

## 2022-01-01 RX ORDER — HEPARIN SODIUM 5000 [USP'U]/ML
800 INJECTION INTRAVENOUS; SUBCUTANEOUS
Qty: 25000 | Refills: 0 | Status: DISCONTINUED | OUTPATIENT
Start: 2022-01-01 | End: 2022-01-01

## 2022-01-01 RX ORDER — INSULIN GLARGINE 100 [IU]/ML
20 INJECTION, SOLUTION SUBCUTANEOUS AT BEDTIME
Refills: 0 | Status: DISCONTINUED | OUTPATIENT
Start: 2022-01-01 | End: 2022-01-01

## 2022-01-01 RX ORDER — GABAPENTIN 400 MG/1
300 CAPSULE ORAL THREE TIMES A DAY
Refills: 0 | Status: DISCONTINUED | OUTPATIENT
Start: 2022-01-01 | End: 2022-01-01

## 2022-01-01 RX ORDER — WARFARIN SODIUM 2.5 MG/1
7.5 TABLET ORAL ONCE
Refills: 0 | Status: DISCONTINUED | OUTPATIENT
Start: 2022-01-01 | End: 2022-01-01

## 2022-01-01 RX ORDER — HYDROMORPHONE HYDROCHLORIDE 2 MG/ML
0.25 INJECTION INTRAMUSCULAR; INTRAVENOUS; SUBCUTANEOUS
Refills: 0 | Status: DISCONTINUED | OUTPATIENT
Start: 2022-01-01 | End: 2022-01-01

## 2022-01-01 RX ORDER — TRAMADOL HYDROCHLORIDE 50 MG/1
50 TABLET ORAL EVERY 4 HOURS
Refills: 0 | Status: DISCONTINUED | OUTPATIENT
Start: 2022-01-01 | End: 2022-01-01

## 2022-01-01 RX ORDER — ONDANSETRON 8 MG/1
4 TABLET, FILM COATED ORAL ONCE
Refills: 0 | Status: COMPLETED | OUTPATIENT
Start: 2022-01-01 | End: 2022-01-01

## 2022-01-01 RX ORDER — INSULIN GLARGINE 100 [IU]/ML
34 INJECTION, SOLUTION SUBCUTANEOUS AT BEDTIME
Refills: 0 | Status: DISCONTINUED | OUTPATIENT
Start: 2022-01-01 | End: 2022-01-01

## 2022-01-01 RX ORDER — TAMSULOSIN HYDROCHLORIDE 0.4 MG/1
0.4 CAPSULE ORAL AT BEDTIME
Refills: 0 | Status: DISCONTINUED | OUTPATIENT
Start: 2022-01-01 | End: 2022-01-01

## 2022-01-01 RX ORDER — INSULIN LISPRO 100/ML
20 VIAL (ML) SUBCUTANEOUS
Qty: 0 | Refills: 0 | DISCHARGE
Start: 2022-01-01

## 2022-01-01 RX ORDER — IPRATROPIUM BROMIDE 0.2 MG/ML
2.5 SOLUTION, NON-ORAL INHALATION
Qty: 0 | Refills: 0 | DISCHARGE

## 2022-01-01 RX ORDER — INSULIN GLARGINE 100 [IU]/ML
10 INJECTION, SOLUTION SUBCUTANEOUS
Qty: 0 | Refills: 0 | DISCHARGE

## 2022-01-01 RX ORDER — HEPARIN SODIUM 5000 [USP'U]/ML
1650 INJECTION INTRAVENOUS; SUBCUTANEOUS
Qty: 25000 | Refills: 0 | Status: DISCONTINUED | OUTPATIENT
Start: 2022-01-01 | End: 2022-01-01

## 2022-01-01 RX ORDER — GABAPENTIN 400 MG/1
100 CAPSULE ORAL
Refills: 0 | Status: DISCONTINUED | OUTPATIENT
Start: 2022-01-01 | End: 2022-01-01

## 2022-01-01 RX ORDER — HEPARIN SODIUM 5000 [USP'U]/ML
1450 INJECTION INTRAVENOUS; SUBCUTANEOUS
Qty: 25000 | Refills: 0 | Status: DISCONTINUED | OUTPATIENT
Start: 2022-01-01 | End: 2022-01-01

## 2022-01-01 RX ORDER — LOSARTAN POTASSIUM 100 MG/1
1 TABLET, FILM COATED ORAL
Qty: 0 | Refills: 0 | DISCHARGE

## 2022-01-01 RX ORDER — WARFARIN SODIUM 2.5 MG/1
3 TABLET ORAL ONCE
Refills: 0 | Status: COMPLETED | OUTPATIENT
Start: 2022-01-01 | End: 2022-01-01

## 2022-01-01 RX ORDER — INSULIN DETEMIR 100/ML (3)
30 INSULIN PEN (ML) SUBCUTANEOUS
Qty: 0 | Refills: 0 | DISCHARGE

## 2022-01-01 RX ORDER — POTASSIUM CHLORIDE 20 MEQ
20 PACKET (EA) ORAL ONCE
Refills: 0 | Status: COMPLETED | OUTPATIENT
Start: 2022-01-01 | End: 2022-01-01

## 2022-01-01 RX ORDER — INSULIN GLARGINE 100 [IU]/ML
13 INJECTION, SOLUTION SUBCUTANEOUS AT BEDTIME
Refills: 0 | Status: DISCONTINUED | OUTPATIENT
Start: 2022-01-01 | End: 2022-01-01

## 2022-01-01 RX ORDER — CEFTRIAXONE 500 MG/1
1000 INJECTION, POWDER, FOR SOLUTION INTRAMUSCULAR; INTRAVENOUS EVERY 24 HOURS
Refills: 0 | Status: COMPLETED | OUTPATIENT
Start: 2022-01-01 | End: 2022-01-01

## 2022-01-01 RX ORDER — MULTIVIT-MIN/FERROUS GLUCONATE 9 MG/15 ML
1 LIQUID (ML) ORAL
Qty: 0 | Refills: 0 | DISCHARGE

## 2022-01-01 RX ORDER — METOPROLOL TARTRATE 50 MG
50 TABLET ORAL
Refills: 0 | Status: DISCONTINUED | OUTPATIENT
Start: 2022-01-01 | End: 2022-01-01

## 2022-01-01 RX ORDER — ACETAMINOPHEN 500 MG
975 TABLET ORAL EVERY 6 HOURS
Refills: 0 | Status: DISCONTINUED | OUTPATIENT
Start: 2022-01-01 | End: 2022-01-01

## 2022-01-01 RX ORDER — LEVOTHYROXINE SODIUM 125 MCG
18.75 TABLET ORAL AT BEDTIME
Refills: 0 | Status: DISCONTINUED | OUTPATIENT
Start: 2022-01-01 | End: 2022-01-01

## 2022-01-01 RX ORDER — INSULIN LISPRO 100/ML
18 VIAL (ML) SUBCUTANEOUS
Refills: 0 | Status: DISCONTINUED | OUTPATIENT
Start: 2022-01-01 | End: 2022-01-01

## 2022-01-01 RX ORDER — ACETAMINOPHEN 500 MG
325 TABLET ORAL ONCE
Refills: 0 | Status: COMPLETED | OUTPATIENT
Start: 2022-01-01 | End: 2022-01-01

## 2022-01-01 RX ORDER — ERYTHROPOIETIN 10000 [IU]/ML
20000 INJECTION, SOLUTION INTRAVENOUS; SUBCUTANEOUS ONCE
Refills: 0 | Status: COMPLETED | OUTPATIENT
Start: 2022-01-01 | End: 2022-01-01

## 2022-01-01 RX ORDER — OXYCODONE HYDROCHLORIDE 5 MG/1
5 TABLET ORAL EVERY 4 HOURS
Refills: 0 | Status: DISCONTINUED | OUTPATIENT
Start: 2022-01-01 | End: 2022-01-01

## 2022-01-01 RX ORDER — INSULIN GLARGINE 100 [IU]/ML
12 INJECTION, SOLUTION SUBCUTANEOUS AT BEDTIME
Refills: 0 | Status: DISCONTINUED | OUTPATIENT
Start: 2022-01-01 | End: 2022-01-01

## 2022-01-01 RX ORDER — HYDROMORPHONE HYDROCHLORIDE 2 MG/ML
0.5 INJECTION INTRAMUSCULAR; INTRAVENOUS; SUBCUTANEOUS DAILY
Refills: 0 | Status: DISCONTINUED | OUTPATIENT
Start: 2022-01-01 | End: 2022-01-01

## 2022-01-01 RX ORDER — IPRATROPIUM/ALBUTEROL SULFATE 18-103MCG
3 AEROSOL WITH ADAPTER (GRAM) INHALATION
Qty: 0 | Refills: 0 | DISCHARGE
Start: 2022-01-01

## 2022-01-01 RX ORDER — VANCOMYCIN HCL 1 G
500 VIAL (EA) INTRAVENOUS EVERY 12 HOURS
Refills: 0 | Status: DISCONTINUED | OUTPATIENT
Start: 2022-01-01 | End: 2022-01-01

## 2022-01-01 RX ORDER — ACETAMINOPHEN 500 MG
650 TABLET ORAL ONCE
Refills: 0 | Status: COMPLETED | OUTPATIENT
Start: 2022-01-01 | End: 2022-01-01

## 2022-01-01 RX ORDER — FUROSEMIDE 40 MG
80 TABLET ORAL ONCE
Refills: 0 | Status: COMPLETED | OUTPATIENT
Start: 2022-01-01 | End: 2022-01-01

## 2022-01-01 RX ORDER — SODIUM,POTASSIUM PHOSPHATES 278-250MG
1 POWDER IN PACKET (EA) ORAL ONCE
Refills: 0 | Status: COMPLETED | OUTPATIENT
Start: 2022-01-01 | End: 2022-01-01

## 2022-01-01 RX ORDER — INSULIN LISPRO 100/ML
12 VIAL (ML) SUBCUTANEOUS
Refills: 0 | Status: DISCONTINUED | OUTPATIENT
Start: 2022-01-01 | End: 2022-01-01

## 2022-01-01 RX ORDER — BACITRACIN ZINC 500 UNIT/G
1 OINTMENT IN PACKET (EA) TOPICAL DAILY
Refills: 0 | Status: DISCONTINUED | OUTPATIENT
Start: 2022-01-01 | End: 2022-01-01

## 2022-01-01 RX ORDER — SENNA PLUS 8.6 MG/1
2 TABLET ORAL AT BEDTIME
Refills: 0 | Status: DISCONTINUED | OUTPATIENT
Start: 2022-01-01 | End: 2022-01-01

## 2022-01-01 RX ORDER — MAGNESIUM OXIDE 400 MG ORAL TABLET 241.3 MG
400 TABLET ORAL ONCE
Refills: 0 | Status: COMPLETED | OUTPATIENT
Start: 2022-01-01 | End: 2022-01-01

## 2022-01-01 RX ORDER — GABAPENTIN 400 MG/1
1 CAPSULE ORAL
Qty: 0 | Refills: 0 | DISCHARGE

## 2022-01-01 RX ORDER — COLLAGENASE CLOSTRIDIUM HIST. 250 UNIT/G
1 OINTMENT (GRAM) TOPICAL DAILY
Refills: 0 | Status: DISCONTINUED | OUTPATIENT
Start: 2022-01-01 | End: 2022-01-01

## 2022-01-01 RX ORDER — VANCOMYCIN HCL 1 G
1250 VIAL (EA) INTRAVENOUS EVERY 12 HOURS
Refills: 0 | Status: DISCONTINUED | OUTPATIENT
Start: 2022-01-01 | End: 2022-01-01

## 2022-01-01 RX ORDER — IRON SUCROSE 20 MG/ML
200 INJECTION, SOLUTION INTRAVENOUS ONCE
Refills: 0 | Status: COMPLETED | OUTPATIENT
Start: 2022-01-01 | End: 2022-01-01

## 2022-01-01 RX ORDER — INSULIN GLARGINE 100 [IU]/ML
24 INJECTION, SOLUTION SUBCUTANEOUS AT BEDTIME
Refills: 0 | Status: ACTIVE | OUTPATIENT
Start: 2022-01-01 | End: 2023-08-14

## 2022-01-01 RX ORDER — INSULIN GLARGINE 100 [IU]/ML
36 INJECTION, SOLUTION SUBCUTANEOUS AT BEDTIME
Refills: 0 | Status: DISCONTINUED | OUTPATIENT
Start: 2022-01-01 | End: 2022-01-01

## 2022-01-01 RX ORDER — TRAMADOL HYDROCHLORIDE 50 MG/1
1 TABLET ORAL
Qty: 0 | Refills: 0 | DISCHARGE

## 2022-01-01 RX ORDER — TRAMADOL HYDROCHLORIDE 50 MG/1
25 TABLET ORAL ONCE
Refills: 0 | Status: DISCONTINUED | OUTPATIENT
Start: 2022-01-01 | End: 2022-01-01

## 2022-01-01 RX ORDER — INSULIN GLARGINE 100 [IU]/ML
30 INJECTION, SOLUTION SUBCUTANEOUS ONCE
Refills: 0 | Status: COMPLETED | OUTPATIENT
Start: 2022-01-01 | End: 2022-01-01

## 2022-01-01 RX ORDER — BUDESONIDE, MICRONIZED 100 %
0.5 POWDER (GRAM) MISCELLANEOUS
Qty: 0 | Refills: 0 | DISCHARGE

## 2022-01-01 RX ORDER — MONTELUKAST 4 MG/1
1 TABLET, CHEWABLE ORAL
Qty: 0 | Refills: 0 | DISCHARGE

## 2022-01-01 RX ORDER — INSULIN GLARGINE 100 [IU]/ML
7 INJECTION, SOLUTION SUBCUTANEOUS AT BEDTIME
Refills: 0 | Status: DISCONTINUED | OUTPATIENT
Start: 2022-01-01 | End: 2022-01-01

## 2022-01-01 RX ORDER — VANCOMYCIN HCL 1 G
1000 VIAL (EA) INTRAVENOUS ONCE
Refills: 0 | Status: DISCONTINUED | OUTPATIENT
Start: 2022-01-01 | End: 2022-01-01

## 2022-01-01 RX ORDER — INSULIN LISPRO 100/ML
11 VIAL (ML) SUBCUTANEOUS
Refills: 0 | Status: DISCONTINUED | OUTPATIENT
Start: 2022-01-01 | End: 2022-01-01

## 2022-01-01 RX ORDER — METFORMIN HYDROCHLORIDE 850 MG/1
1 TABLET ORAL
Qty: 0 | Refills: 0 | DISCHARGE

## 2022-01-01 RX ORDER — POTASSIUM CHLORIDE 20 MEQ
40 PACKET (EA) ORAL EVERY 4 HOURS
Refills: 0 | Status: DISCONTINUED | OUTPATIENT
Start: 2022-01-01 | End: 2022-01-01

## 2022-01-01 RX ORDER — LOSARTAN POTASSIUM 100 MG/1
100 TABLET, FILM COATED ORAL DAILY
Refills: 0 | Status: DISCONTINUED | OUTPATIENT
Start: 2022-01-01 | End: 2022-01-01

## 2022-01-01 RX ORDER — INSULIN LISPRO 100/ML
18 VIAL (ML) SUBCUTANEOUS
Qty: 0 | Refills: 0 | DISCHARGE
Start: 2022-01-01

## 2022-01-01 RX ORDER — POTASSIUM PHOSPHATE, MONOBASIC POTASSIUM PHOSPHATE, DIBASIC 236; 224 MG/ML; MG/ML
15 INJECTION, SOLUTION INTRAVENOUS ONCE
Refills: 0 | Status: COMPLETED | OUTPATIENT
Start: 2022-01-01 | End: 2022-01-01

## 2022-01-01 RX ORDER — ACETAMINOPHEN 500 MG
2 TABLET ORAL
Qty: 0 | Refills: 0 | DISCHARGE
Start: 2022-01-01

## 2022-01-01 RX ORDER — MONTELUKAST 4 MG/1
10 TABLET, CHEWABLE ORAL AT BEDTIME
Refills: 0 | Status: DISCONTINUED | OUTPATIENT
Start: 2022-01-01 | End: 2022-01-01

## 2022-01-01 RX ORDER — GUAIFENESIN/DEXTROMETHORPHAN 600MG-30MG
10 TABLET, EXTENDED RELEASE 12 HR ORAL EVERY 6 HOURS
Refills: 0 | Status: DISCONTINUED | OUTPATIENT
Start: 2022-01-01 | End: 2022-01-01

## 2022-01-01 RX ORDER — TIOTROPIUM BROMIDE 18 UG/1
1 CAPSULE ORAL; RESPIRATORY (INHALATION) DAILY
Refills: 0 | Status: DISCONTINUED | OUTPATIENT
Start: 2022-01-01 | End: 2022-01-01

## 2022-01-01 RX ORDER — METOPROLOL TARTRATE 50 MG
1 TABLET ORAL
Qty: 0 | Refills: 0 | DISCHARGE
Start: 2022-01-01

## 2022-01-01 RX ORDER — CADEXOMER IODINE 0.9 %
1 PADS, MEDICATED (EA) TOPICAL
Qty: 0 | Refills: 0 | DISCHARGE
Start: 2022-01-01

## 2022-01-01 RX ORDER — LEVOTHYROXINE SODIUM 125 MCG
1 TABLET ORAL
Qty: 0 | Refills: 0 | DISCHARGE

## 2022-01-01 RX ORDER — SODIUM CHLORIDE 9 MG/ML
4 INJECTION INTRAMUSCULAR; INTRAVENOUS; SUBCUTANEOUS
Qty: 0 | Refills: 0 | DISCHARGE
Start: 2022-01-01

## 2022-01-01 RX ORDER — MONTELUKAST 4 MG/1
10 TABLET, CHEWABLE ORAL DAILY
Refills: 0 | Status: DISCONTINUED | OUTPATIENT
Start: 2022-01-01 | End: 2022-01-01

## 2022-01-01 RX ORDER — INSULIN GLARGINE 100 [IU]/ML
15 INJECTION, SOLUTION SUBCUTANEOUS AT BEDTIME
Refills: 0 | Status: DISCONTINUED | OUTPATIENT
Start: 2022-01-01 | End: 2022-01-01

## 2022-01-01 RX ORDER — INSULIN HUMAN 100 [IU]/ML
3 INJECTION, SOLUTION SUBCUTANEOUS
Qty: 100 | Refills: 0 | Status: DISCONTINUED | OUTPATIENT
Start: 2022-01-01 | End: 2022-01-01

## 2022-01-01 RX ORDER — INSULIN LISPRO 100/ML
6 VIAL (ML) SUBCUTANEOUS
Refills: 0 | Status: DISCONTINUED | OUTPATIENT
Start: 2022-01-01 | End: 2022-01-01

## 2022-01-01 RX ORDER — ROSUVASTATIN CALCIUM 5 MG/1
1 TABLET ORAL
Qty: 0 | Refills: 0 | DISCHARGE

## 2022-01-01 RX ORDER — INSULIN GLARGINE 100 [IU]/ML
10 INJECTION, SOLUTION SUBCUTANEOUS
Qty: 0 | Refills: 0 | DISCHARGE
Start: 2022-01-01

## 2022-01-01 RX ORDER — ALBUTEROL 90 UG/1
3 AEROSOL, METERED ORAL
Qty: 0 | Refills: 0 | DISCHARGE

## 2022-01-01 RX ORDER — METOPROLOL SUCCINATE 25 MG/1
25 TABLET, EXTENDED RELEASE ORAL
Qty: 90 | Refills: 0 | Status: ACTIVE | COMMUNITY
Start: 2022-01-01

## 2022-01-01 RX ORDER — BUDESONIDE, MICRONIZED 100 %
0.5 POWDER (GRAM) MISCELLANEOUS EVERY 12 HOURS
Refills: 0 | Status: DISCONTINUED | OUTPATIENT
Start: 2022-01-01 | End: 2022-01-01

## 2022-01-01 RX ORDER — FUROSEMIDE 40 MG
40 TABLET ORAL ONCE
Refills: 0 | Status: DISCONTINUED | OUTPATIENT
Start: 2022-01-01 | End: 2022-01-01

## 2022-01-01 RX ORDER — CHLORHEXIDINE GLUCONATE 213 G/1000ML
1 SOLUTION TOPICAL
Refills: 0 | Status: DISCONTINUED | OUTPATIENT
Start: 2022-01-01 | End: 2022-01-01

## 2022-01-01 RX ORDER — MORPHINE SULFATE 50 MG/1
2 CAPSULE, EXTENDED RELEASE ORAL EVERY 4 HOURS
Refills: 0 | Status: DISCONTINUED | OUTPATIENT
Start: 2022-01-01 | End: 2022-01-01

## 2022-01-01 RX ORDER — HEPARIN SODIUM 5000 [USP'U]/ML
1500 INJECTION INTRAVENOUS; SUBCUTANEOUS
Qty: 25000 | Refills: 0 | Status: DISCONTINUED | OUTPATIENT
Start: 2022-01-01 | End: 2022-01-01

## 2022-01-01 RX ORDER — MONTELUKAST 4 MG/1
1 TABLET, CHEWABLE ORAL
Qty: 0 | Refills: 0 | DISCHARGE
Start: 2022-01-01

## 2022-01-01 RX ORDER — INSULIN LISPRO 100/ML
7 VIAL (ML) SUBCUTANEOUS
Refills: 0 | Status: DISCONTINUED | OUTPATIENT
Start: 2022-01-01 | End: 2022-01-01

## 2022-01-01 RX ORDER — LEVOTHYROXINE SODIUM 125 MCG
20 TABLET ORAL AT BEDTIME
Refills: 0 | Status: DISCONTINUED | OUTPATIENT
Start: 2022-01-01 | End: 2022-01-01

## 2022-01-01 RX ORDER — COLLAGENASE CLOSTRIDIUM HIST. 250 UNIT/G
1 OINTMENT (GRAM) TOPICAL
Qty: 0 | Refills: 0 | DISCHARGE

## 2022-01-01 RX ORDER — INSULIN LISPRO 100/ML
4 VIAL (ML) SUBCUTANEOUS
Refills: 0 | Status: DISCONTINUED | OUTPATIENT
Start: 2022-01-01 | End: 2022-01-01

## 2022-01-01 RX ORDER — ENOXAPARIN SODIUM 100 MG/ML
30 INJECTION SUBCUTANEOUS EVERY 24 HOURS
Refills: 0 | Status: DISCONTINUED | OUTPATIENT
Start: 2022-01-01 | End: 2022-01-01

## 2022-01-01 RX ORDER — INSULIN GLARGINE 100 [IU]/ML
23 INJECTION, SOLUTION SUBCUTANEOUS AT BEDTIME
Refills: 0 | Status: DISCONTINUED | OUTPATIENT
Start: 2022-01-01 | End: 2022-01-01

## 2022-01-01 RX ORDER — PIPERACILLIN AND TAZOBACTAM 4; .5 G/20ML; G/20ML
3.38 INJECTION, POWDER, LYOPHILIZED, FOR SOLUTION INTRAVENOUS EVERY 8 HOURS
Refills: 0 | Status: COMPLETED | OUTPATIENT
Start: 2022-01-01 | End: 2022-01-01

## 2022-01-01 RX ORDER — HEPARIN SODIUM 5000 [USP'U]/ML
1800 INJECTION INTRAVENOUS; SUBCUTANEOUS
Qty: 25000 | Refills: 0 | Status: DISCONTINUED | OUTPATIENT
Start: 2022-01-01 | End: 2022-01-01

## 2022-01-01 RX ORDER — ATORVASTATIN CALCIUM 80 MG/1
40 TABLET, FILM COATED ORAL AT BEDTIME
Refills: 0 | Status: DISCONTINUED | OUTPATIENT
Start: 2022-01-01 | End: 2022-01-01

## 2022-01-01 RX ORDER — FENTANYL CITRATE 50 UG/ML
25 INJECTION INTRAVENOUS
Refills: 0 | Status: DISCONTINUED | OUTPATIENT
Start: 2022-01-01 | End: 2022-01-01

## 2022-01-01 RX ORDER — GABAPENTIN 400 MG/1
100 CAPSULE ORAL EVERY 8 HOURS
Refills: 0 | Status: DISCONTINUED | OUTPATIENT
Start: 2022-01-01 | End: 2022-01-01

## 2022-01-01 RX ORDER — ACETAMINOPHEN 500 MG
975 TABLET ORAL ONCE
Refills: 0 | Status: COMPLETED | OUTPATIENT
Start: 2022-01-01 | End: 2022-01-01

## 2022-01-01 RX ORDER — POTASSIUM CHLORIDE 20 MEQ
10 PACKET (EA) ORAL ONCE
Refills: 0 | Status: DISCONTINUED | OUTPATIENT
Start: 2022-01-01 | End: 2022-01-01

## 2022-01-01 RX ORDER — ROBINUL 0.2 MG/ML
0.1 INJECTION INTRAMUSCULAR; INTRAVENOUS
Refills: 0 | Status: COMPLETED | OUTPATIENT
Start: 2022-01-01 | End: 2022-01-01

## 2022-01-01 RX ORDER — HEPARIN SODIUM 5000 [USP'U]/ML
1200 INJECTION INTRAVENOUS; SUBCUTANEOUS
Qty: 25000 | Refills: 0 | Status: DISCONTINUED | OUTPATIENT
Start: 2022-01-01 | End: 2022-01-01

## 2022-01-01 RX ORDER — WARFARIN SODIUM 2.5 MG/1
3 TABLET ORAL ONCE
Refills: 0 | Status: DISCONTINUED | OUTPATIENT
Start: 2022-01-01 | End: 2022-01-01

## 2022-01-01 RX ORDER — OXYCODONE HYDROCHLORIDE 5 MG/1
10 TABLET ORAL EVERY 4 HOURS
Refills: 0 | Status: DISCONTINUED | OUTPATIENT
Start: 2022-01-01 | End: 2022-01-01

## 2022-01-01 RX ORDER — ATORVASTATIN CALCIUM 80 MG/1
80 TABLET, FILM COATED ORAL AT BEDTIME
Refills: 0 | Status: DISCONTINUED | OUTPATIENT
Start: 2022-01-01 | End: 2022-01-01

## 2022-01-01 RX ORDER — ASPIRIN/CALCIUM CARB/MAGNESIUM 324 MG
162 TABLET ORAL ONCE
Refills: 0 | Status: COMPLETED | OUTPATIENT
Start: 2022-01-01 | End: 2022-01-01

## 2022-01-01 RX ORDER — DEXTROSE MONOHYDRATE, SODIUM CHLORIDE, AND POTASSIUM CHLORIDE 50; .745; 4.5 G/1000ML; G/1000ML; G/1000ML
1000 INJECTION, SOLUTION INTRAVENOUS
Refills: 0 | Status: DISCONTINUED | OUTPATIENT
Start: 2022-01-01 | End: 2022-01-01

## 2022-01-01 RX ORDER — INSULIN LISPRO 100/ML
15 VIAL (ML) SUBCUTANEOUS ONCE
Refills: 0 | Status: COMPLETED | OUTPATIENT
Start: 2022-01-01 | End: 2022-01-01

## 2022-01-01 RX ORDER — ROBINUL 0.2 MG/ML
1 INJECTION INTRAMUSCULAR; INTRAVENOUS
Qty: 0 | Refills: 0 | DISCHARGE
Start: 2022-01-01

## 2022-01-01 RX ORDER — GABAPENTIN 100 MG/1
100 CAPSULE ORAL DAILY
Refills: 0 | Status: ACTIVE | COMMUNITY
Start: 2022-01-01

## 2022-01-01 RX ORDER — INSULIN GLARGINE 100 [IU]/ML
10 INJECTION, SOLUTION SUBCUTANEOUS ONCE
Refills: 0 | Status: COMPLETED | OUTPATIENT
Start: 2022-01-01 | End: 2022-01-01

## 2022-01-01 RX ORDER — ONDANSETRON 8 MG/1
4 TABLET, FILM COATED ORAL ONCE
Refills: 0 | Status: DISCONTINUED | OUTPATIENT
Start: 2022-01-01 | End: 2022-01-01

## 2022-01-01 RX ORDER — ROBINUL 0.2 MG/ML
0.4 INJECTION INTRAMUSCULAR; INTRAVENOUS EVERY 6 HOURS
Refills: 0 | Status: DISCONTINUED | OUTPATIENT
Start: 2022-01-01 | End: 2022-01-01

## 2022-01-01 RX ORDER — CADEXOMER IODINE 0.9 %
0 PADS, MEDICATED (EA) TOPICAL
Qty: 0 | Refills: 0 | DISCHARGE

## 2022-01-01 RX ORDER — PANTOPRAZOLE SODIUM 20 MG/1
40 TABLET, DELAYED RELEASE ORAL EVERY 24 HOURS
Refills: 0 | Status: DISCONTINUED | OUTPATIENT
Start: 2022-01-01 | End: 2022-01-01

## 2022-01-01 RX ORDER — POTASSIUM CHLORIDE 20 MEQ
20 PACKET (EA) ORAL
Refills: 0 | Status: COMPLETED | OUTPATIENT
Start: 2022-01-01 | End: 2022-01-01

## 2022-01-01 RX ORDER — SODIUM CHLORIDE 9 MG/ML
4 INJECTION INTRAMUSCULAR; INTRAVENOUS; SUBCUTANEOUS EVERY 12 HOURS
Refills: 0 | Status: DISCONTINUED | OUTPATIENT
Start: 2022-01-01 | End: 2022-01-01

## 2022-01-01 RX ORDER — ACETYLCYSTEINE 200 MG/ML
4 VIAL (ML) MISCELLANEOUS
Qty: 0 | Refills: 0 | DISCHARGE
Start: 2022-01-01

## 2022-01-01 RX ORDER — ACETYLCYSTEINE 200 MG/ML
4 VIAL (ML) MISCELLANEOUS
Qty: 0 | Refills: 0 | DISCHARGE

## 2022-01-01 RX ORDER — ATORVASTATIN CALCIUM 80 MG/1
1 TABLET, FILM COATED ORAL
Qty: 0 | Refills: 0 | DISCHARGE

## 2022-01-01 RX ORDER — BUDESONIDE, MICRONIZED 100 %
2 POWDER (GRAM) MISCELLANEOUS
Qty: 0 | Refills: 0 | DISCHARGE

## 2022-01-01 RX ORDER — MORPHINE SULFATE 50 MG/1
2 CAPSULE, EXTENDED RELEASE ORAL
Refills: 0 | Status: DISCONTINUED | OUTPATIENT
Start: 2022-01-01 | End: 2022-01-01

## 2022-01-01 RX ORDER — ATORVASTATIN CALCIUM 80 MG/1
1 TABLET, FILM COATED ORAL
Qty: 0 | Refills: 0 | DISCHARGE
Start: 2022-01-01

## 2022-01-01 RX ORDER — AMOXICILLIN AND CLAVULANATE POTASSIUM 875; 125 MG/1; MG/1
875-125 TABLET, COATED ORAL
Qty: 14 | Refills: 0 | Status: ACTIVE | COMMUNITY
Start: 2022-01-01

## 2022-01-01 RX ORDER — ACETAMINOPHEN 500 MG
1000 TABLET ORAL ONCE
Refills: 0 | Status: DISCONTINUED | OUTPATIENT
Start: 2022-01-01 | End: 2022-01-01

## 2022-01-01 RX ORDER — INSULIN GLARGINE 100 [IU]/ML
40 INJECTION, SOLUTION SUBCUTANEOUS ONCE
Refills: 0 | Status: COMPLETED | OUTPATIENT
Start: 2022-01-01 | End: 2022-01-01

## 2022-01-01 RX ORDER — INSULIN LISPRO 100/ML
24 VIAL (ML) SUBCUTANEOUS
Refills: 0 | Status: DISCONTINUED | OUTPATIENT
Start: 2022-01-01 | End: 2022-01-01

## 2022-01-01 RX ORDER — GABAPENTIN 400 MG/1
100 CAPSULE ORAL THREE TIMES A DAY
Refills: 0 | Status: DISCONTINUED | OUTPATIENT
Start: 2022-01-01 | End: 2022-01-01

## 2022-01-01 RX ORDER — HEPARIN SODIUM 5000 [USP'U]/ML
5000 INJECTION INTRAVENOUS; SUBCUTANEOUS EVERY 8 HOURS
Refills: 0 | Status: DISCONTINUED | OUTPATIENT
Start: 2022-01-01 | End: 2022-01-01

## 2022-01-01 RX ORDER — GUAIFENESIN/DEXTROMETHORPHAN 600MG-30MG
10 TABLET, EXTENDED RELEASE 12 HR ORAL
Qty: 0 | Refills: 0 | DISCHARGE
Start: 2022-01-01

## 2022-01-01 RX ORDER — PHYTONADIONE (VIT K1) 5 MG
5 TABLET ORAL ONCE
Refills: 0 | Status: COMPLETED | OUTPATIENT
Start: 2022-01-01 | End: 2022-01-01

## 2022-01-01 RX ORDER — SACUBITRIL AND VALSARTAN 24; 26 MG/1; MG/1
1 TABLET, FILM COATED ORAL
Refills: 0 | Status: DISCONTINUED | OUTPATIENT
Start: 2022-01-01 | End: 2022-01-01

## 2022-01-01 RX ORDER — INSULIN GLARGINE 100 [IU]/ML
22 INJECTION, SOLUTION SUBCUTANEOUS AT BEDTIME
Refills: 0 | Status: DISCONTINUED | OUTPATIENT
Start: 2022-01-01 | End: 2022-01-01

## 2022-01-01 RX ORDER — INSULIN GLARGINE 100 [IU]/ML
28 INJECTION, SOLUTION SUBCUTANEOUS AT BEDTIME
Refills: 0 | Status: DISCONTINUED | OUTPATIENT
Start: 2022-01-01 | End: 2022-01-01

## 2022-01-01 RX ORDER — INSULIN LISPRO 100/ML
7 VIAL (ML) SUBCUTANEOUS
Refills: 0 | Status: ACTIVE | OUTPATIENT
Start: 2022-01-01 | End: 2023-08-14

## 2022-01-01 RX ORDER — INSULIN GLARGINE 100 [IU]/ML
30 INJECTION, SOLUTION SUBCUTANEOUS
Qty: 0 | Refills: 0 | DISCHARGE

## 2022-01-01 RX ORDER — IPRATROPIUM BROMIDE 0.2 MG/ML
0 SOLUTION, NON-ORAL INHALATION
Qty: 0 | Refills: 0 | DISCHARGE

## 2022-01-01 RX ORDER — OXYCODONE HYDROCHLORIDE 5 MG/1
1 TABLET ORAL
Qty: 0 | Refills: 0 | DISCHARGE
Start: 2022-01-01

## 2022-01-01 RX ORDER — HEPARIN SODIUM 5000 [USP'U]/ML
400 INJECTION INTRAVENOUS; SUBCUTANEOUS
Qty: 25000 | Refills: 0 | Status: DISCONTINUED | OUTPATIENT
Start: 2022-01-01 | End: 2022-01-01

## 2022-01-01 RX ORDER — CEFEPIME 1 G/1
2000 INJECTION, POWDER, FOR SOLUTION INTRAMUSCULAR; INTRAVENOUS ONCE
Refills: 0 | Status: DISCONTINUED | OUTPATIENT
Start: 2022-01-01 | End: 2022-01-01

## 2022-01-01 RX ADMIN — Medication 975 MILLIGRAM(S): at 14:40

## 2022-01-01 RX ADMIN — Medication 3: at 08:57

## 2022-01-01 RX ADMIN — SODIUM CHLORIDE 4 MILLILITER(S): 9 INJECTION INTRAMUSCULAR; INTRAVENOUS; SUBCUTANEOUS at 22:09

## 2022-01-01 RX ADMIN — Medication 3 MILLILITER(S): at 18:05

## 2022-01-01 RX ADMIN — INSULIN GLARGINE 37 UNIT(S): 100 INJECTION, SOLUTION SUBCUTANEOUS at 21:55

## 2022-01-01 RX ADMIN — INSULIN GLARGINE 20 UNIT(S): 100 INJECTION, SOLUTION SUBCUTANEOUS at 21:43

## 2022-01-01 RX ADMIN — PIPERACILLIN AND TAZOBACTAM 25 GRAM(S): 4; .5 INJECTION, POWDER, LYOPHILIZED, FOR SOLUTION INTRAVENOUS at 05:15

## 2022-01-01 RX ADMIN — Medication 25 MICROGRAM(S): at 04:28

## 2022-01-01 RX ADMIN — HEPARIN SODIUM 11 UNIT(S)/HR: 5000 INJECTION INTRAVENOUS; SUBCUTANEOUS at 01:21

## 2022-01-01 RX ADMIN — Medication 975 MILLIGRAM(S): at 05:11

## 2022-01-01 RX ADMIN — ATORVASTATIN CALCIUM 80 MILLIGRAM(S): 80 TABLET, FILM COATED ORAL at 23:34

## 2022-01-01 RX ADMIN — Medication 20 MILLIGRAM(S): at 00:28

## 2022-01-01 RX ADMIN — TAMSULOSIN HYDROCHLORIDE 0.8 MILLIGRAM(S): 0.4 CAPSULE ORAL at 21:02

## 2022-01-01 RX ADMIN — Medication 975 MILLIGRAM(S): at 05:37

## 2022-01-01 RX ADMIN — Medication 40 MILLIGRAM(S): at 18:26

## 2022-01-01 RX ADMIN — INSULIN GLARGINE 20 UNIT(S): 100 INJECTION, SOLUTION SUBCUTANEOUS at 21:12

## 2022-01-01 RX ADMIN — Medication 24 UNIT(S): at 18:14

## 2022-01-01 RX ADMIN — OXYCODONE HYDROCHLORIDE 5 MILLIGRAM(S): 5 TABLET ORAL at 20:08

## 2022-01-01 RX ADMIN — Medication 80 MILLIGRAM(S): at 18:17

## 2022-01-01 RX ADMIN — Medication 1200 MILLIGRAM(S): at 17:46

## 2022-01-01 RX ADMIN — Medication 0.5 MILLIGRAM(S): at 09:06

## 2022-01-01 RX ADMIN — Medication 10 MILLILITER(S): at 05:50

## 2022-01-01 RX ADMIN — TAMSULOSIN HYDROCHLORIDE 0.4 MILLIGRAM(S): 0.4 CAPSULE ORAL at 21:43

## 2022-01-01 RX ADMIN — SODIUM CHLORIDE 50 MILLILITER(S): 9 INJECTION, SOLUTION INTRAVENOUS at 09:12

## 2022-01-01 RX ADMIN — Medication 6 UNIT(S): at 09:03

## 2022-01-01 RX ADMIN — TAMSULOSIN HYDROCHLORIDE 0.4 MILLIGRAM(S): 0.4 CAPSULE ORAL at 22:48

## 2022-01-01 RX ADMIN — GABAPENTIN 100 MILLIGRAM(S): 400 CAPSULE ORAL at 18:18

## 2022-01-01 RX ADMIN — Medication 975 MILLIGRAM(S): at 11:15

## 2022-01-01 RX ADMIN — Medication 25 MILLIGRAM(S): at 06:18

## 2022-01-01 RX ADMIN — Medication 1 TABLET(S): at 12:13

## 2022-01-01 RX ADMIN — Medication 650 MILLIGRAM(S): at 02:14

## 2022-01-01 RX ADMIN — GABAPENTIN 600 MILLIGRAM(S): 400 CAPSULE ORAL at 21:36

## 2022-01-01 RX ADMIN — ATORVASTATIN CALCIUM 80 MILLIGRAM(S): 80 TABLET, FILM COATED ORAL at 21:35

## 2022-01-01 RX ADMIN — Medication 3 MILLILITER(S): at 05:42

## 2022-01-01 RX ADMIN — GABAPENTIN 300 MILLIGRAM(S): 400 CAPSULE ORAL at 22:56

## 2022-01-01 RX ADMIN — OXYCODONE HYDROCHLORIDE 5 MILLIGRAM(S): 5 TABLET ORAL at 15:37

## 2022-01-01 RX ADMIN — HEPARIN SODIUM 18 UNIT(S)/HR: 5000 INJECTION INTRAVENOUS; SUBCUTANEOUS at 07:40

## 2022-01-01 RX ADMIN — Medication 650 MILLIGRAM(S): at 11:00

## 2022-01-01 RX ADMIN — Medication 100 MILLIGRAM(S): at 23:35

## 2022-01-01 RX ADMIN — Medication 25 MILLIGRAM(S): at 05:16

## 2022-01-01 RX ADMIN — Medication 650 MILLIGRAM(S): at 05:00

## 2022-01-01 RX ADMIN — Medication 1200 MILLIGRAM(S): at 17:49

## 2022-01-01 RX ADMIN — Medication 25 MICROGRAM(S): at 05:49

## 2022-01-01 RX ADMIN — Medication 0.5 MILLIGRAM(S): at 10:17

## 2022-01-01 RX ADMIN — Medication 3 MILLILITER(S): at 12:50

## 2022-01-01 RX ADMIN — TRAMADOL HYDROCHLORIDE 50 MILLIGRAM(S): 50 TABLET ORAL at 14:06

## 2022-01-01 RX ADMIN — Medication 0.5 MILLIGRAM(S): at 17:49

## 2022-01-01 RX ADMIN — Medication 10 MILLILITER(S): at 11:24

## 2022-01-01 RX ADMIN — Medication 3 MILLILITER(S): at 17:06

## 2022-01-01 RX ADMIN — GABAPENTIN 100 MILLIGRAM(S): 400 CAPSULE ORAL at 22:17

## 2022-01-01 RX ADMIN — Medication 40 MILLIGRAM(S): at 12:37

## 2022-01-01 RX ADMIN — Medication 975 MILLIGRAM(S): at 22:56

## 2022-01-01 RX ADMIN — Medication 0.5 MILLIGRAM(S): at 05:49

## 2022-01-01 RX ADMIN — Medication 1 TABLET(S): at 11:18

## 2022-01-01 RX ADMIN — Medication 975 MILLIGRAM(S): at 06:26

## 2022-01-01 RX ADMIN — Medication 20 MICROGRAM(S): at 22:23

## 2022-01-01 RX ADMIN — Medication 975 MILLIGRAM(S): at 06:10

## 2022-01-01 RX ADMIN — Medication 2: at 21:40

## 2022-01-01 RX ADMIN — Medication 50 MILLIGRAM(S): at 17:45

## 2022-01-01 RX ADMIN — Medication 81 MILLIGRAM(S): at 10:32

## 2022-01-01 RX ADMIN — Medication 50 MILLIGRAM(S): at 17:52

## 2022-01-01 RX ADMIN — Medication 0.5 MILLIGRAM(S): at 05:47

## 2022-01-01 RX ADMIN — TAMSULOSIN HYDROCHLORIDE 0.8 MILLIGRAM(S): 0.4 CAPSULE ORAL at 21:36

## 2022-01-01 RX ADMIN — Medication 10 MILLILITER(S): at 14:20

## 2022-01-01 RX ADMIN — Medication 20 MILLIGRAM(S): at 18:48

## 2022-01-01 RX ADMIN — Medication 3 MILLILITER(S): at 15:10

## 2022-01-01 RX ADMIN — Medication 3 MILLILITER(S): at 00:21

## 2022-01-01 RX ADMIN — OXYCODONE HYDROCHLORIDE 5 MILLIGRAM(S): 5 TABLET ORAL at 04:01

## 2022-01-01 RX ADMIN — PIPERACILLIN AND TAZOBACTAM 25 GRAM(S): 4; .5 INJECTION, POWDER, LYOPHILIZED, FOR SOLUTION INTRAVENOUS at 21:00

## 2022-01-01 RX ADMIN — SODIUM CHLORIDE 75 MILLILITER(S): 9 INJECTION, SOLUTION INTRAVENOUS at 09:05

## 2022-01-01 RX ADMIN — OXYCODONE HYDROCHLORIDE 10 MILLIGRAM(S): 5 TABLET ORAL at 15:30

## 2022-01-01 RX ADMIN — GABAPENTIN 300 MILLIGRAM(S): 400 CAPSULE ORAL at 14:37

## 2022-01-01 RX ADMIN — Medication 80 MILLIGRAM(S): at 18:05

## 2022-01-01 RX ADMIN — TRAMADOL HYDROCHLORIDE 50 MILLIGRAM(S): 50 TABLET ORAL at 11:30

## 2022-01-01 RX ADMIN — INSULIN GLARGINE 26 UNIT(S): 100 INJECTION, SOLUTION SUBCUTANEOUS at 22:59

## 2022-01-01 RX ADMIN — Medication 3 MILLILITER(S): at 12:41

## 2022-01-01 RX ADMIN — Medication 0.5 MILLIGRAM(S): at 05:25

## 2022-01-01 RX ADMIN — Medication 975 MILLIGRAM(S): at 05:07

## 2022-01-01 RX ADMIN — FINASTERIDE 5 MILLIGRAM(S): 5 TABLET, FILM COATED ORAL at 11:18

## 2022-01-01 RX ADMIN — OXYCODONE HYDROCHLORIDE 5 MILLIGRAM(S): 5 TABLET ORAL at 06:01

## 2022-01-01 RX ADMIN — Medication 975 MILLIGRAM(S): at 22:57

## 2022-01-01 RX ADMIN — PANTOPRAZOLE SODIUM 40 MILLIGRAM(S): 20 TABLET, DELAYED RELEASE ORAL at 05:17

## 2022-01-01 RX ADMIN — Medication 975 MILLIGRAM(S): at 12:13

## 2022-01-01 RX ADMIN — FINASTERIDE 5 MILLIGRAM(S): 5 TABLET, FILM COATED ORAL at 12:06

## 2022-01-01 RX ADMIN — MONTELUKAST 10 MILLIGRAM(S): 4 TABLET, CHEWABLE ORAL at 22:53

## 2022-01-01 RX ADMIN — POLYETHYLENE GLYCOL 3350 17 GRAM(S): 17 POWDER, FOR SOLUTION ORAL at 17:23

## 2022-01-01 RX ADMIN — SACUBITRIL AND VALSARTAN 1 TABLET(S): 24; 26 TABLET, FILM COATED ORAL at 06:25

## 2022-01-01 RX ADMIN — GABAPENTIN 600 MILLIGRAM(S): 400 CAPSULE ORAL at 21:38

## 2022-01-01 RX ADMIN — Medication 40 MILLIGRAM(S): at 18:17

## 2022-01-01 RX ADMIN — Medication 1 TABLET(S): at 13:52

## 2022-01-01 RX ADMIN — Medication 2: at 13:40

## 2022-01-01 RX ADMIN — PANTOPRAZOLE SODIUM 40 MILLIGRAM(S): 20 TABLET, DELAYED RELEASE ORAL at 05:03

## 2022-01-01 RX ADMIN — Medication 0.5 MILLIGRAM(S): at 17:34

## 2022-01-01 RX ADMIN — Medication 1 PACKET(S): at 13:12

## 2022-01-01 RX ADMIN — Medication 3 MILLILITER(S): at 05:34

## 2022-01-01 RX ADMIN — PANTOPRAZOLE SODIUM 40 MILLIGRAM(S): 20 TABLET, DELAYED RELEASE ORAL at 06:11

## 2022-01-01 RX ADMIN — POLYETHYLENE GLYCOL 3350 17 GRAM(S): 17 POWDER, FOR SOLUTION ORAL at 11:02

## 2022-01-01 RX ADMIN — Medication 20 MILLIGRAM(S): at 12:53

## 2022-01-01 RX ADMIN — Medication 1 APPLICATION(S): at 10:48

## 2022-01-01 RX ADMIN — Medication 1 APPLICATION(S): at 11:33

## 2022-01-01 RX ADMIN — ENOXAPARIN SODIUM 90 MILLIGRAM(S): 100 INJECTION SUBCUTANEOUS at 17:52

## 2022-01-01 RX ADMIN — PIPERACILLIN AND TAZOBACTAM 25 GRAM(S): 4; .5 INJECTION, POWDER, LYOPHILIZED, FOR SOLUTION INTRAVENOUS at 21:34

## 2022-01-01 RX ADMIN — Medication 40 MILLIGRAM(S): at 17:11

## 2022-01-01 RX ADMIN — Medication 975 MILLIGRAM(S): at 13:15

## 2022-01-01 RX ADMIN — Medication 1 APPLICATION(S): at 12:33

## 2022-01-01 RX ADMIN — Medication 20 MILLIGRAM(S): at 23:35

## 2022-01-01 RX ADMIN — OXYCODONE HYDROCHLORIDE 5 MILLIGRAM(S): 5 TABLET ORAL at 17:34

## 2022-01-01 RX ADMIN — PANTOPRAZOLE SODIUM 40 MILLIGRAM(S): 20 TABLET, DELAYED RELEASE ORAL at 06:00

## 2022-01-01 RX ADMIN — FINASTERIDE 5 MILLIGRAM(S): 5 TABLET, FILM COATED ORAL at 12:33

## 2022-01-01 RX ADMIN — Medication 650 MILLIGRAM(S): at 01:45

## 2022-01-01 RX ADMIN — Medication 20 MILLIGRAM(S): at 00:10

## 2022-01-01 RX ADMIN — MONTELUKAST 10 MILLIGRAM(S): 4 TABLET, CHEWABLE ORAL at 11:14

## 2022-01-01 RX ADMIN — Medication 3 MILLILITER(S): at 13:05

## 2022-01-01 RX ADMIN — Medication 40 MILLIGRAM(S): at 02:14

## 2022-01-01 RX ADMIN — POLYETHYLENE GLYCOL 3350 17 GRAM(S): 17 POWDER, FOR SOLUTION ORAL at 05:47

## 2022-01-01 RX ADMIN — OXYCODONE HYDROCHLORIDE 5 MILLIGRAM(S): 5 TABLET ORAL at 06:05

## 2022-01-01 RX ADMIN — TAMSULOSIN HYDROCHLORIDE 0.8 MILLIGRAM(S): 0.4 CAPSULE ORAL at 21:34

## 2022-01-01 RX ADMIN — Medication 25 MICROGRAM(S): at 06:24

## 2022-01-01 RX ADMIN — PIPERACILLIN AND TAZOBACTAM 200 GRAM(S): 4; .5 INJECTION, POWDER, LYOPHILIZED, FOR SOLUTION INTRAVENOUS at 22:21

## 2022-01-01 RX ADMIN — TRAMADOL HYDROCHLORIDE 25 MILLIGRAM(S): 50 TABLET ORAL at 01:42

## 2022-01-01 RX ADMIN — Medication 40 MILLIGRAM(S): at 10:21

## 2022-01-01 RX ADMIN — Medication 25 MILLIGRAM(S): at 06:24

## 2022-01-01 RX ADMIN — CEFTRIAXONE 100 MILLIGRAM(S): 500 INJECTION, POWDER, FOR SOLUTION INTRAMUSCULAR; INTRAVENOUS at 18:54

## 2022-01-01 RX ADMIN — Medication 80 MILLIGRAM(S): at 05:21

## 2022-01-01 RX ADMIN — TAMSULOSIN HYDROCHLORIDE 0.8 MILLIGRAM(S): 0.4 CAPSULE ORAL at 21:12

## 2022-01-01 RX ADMIN — Medication 975 MILLIGRAM(S): at 23:00

## 2022-01-01 RX ADMIN — ATORVASTATIN CALCIUM 80 MILLIGRAM(S): 80 TABLET, FILM COATED ORAL at 21:46

## 2022-01-01 RX ADMIN — OXYCODONE HYDROCHLORIDE 5 MILLIGRAM(S): 5 TABLET ORAL at 18:01

## 2022-01-01 RX ADMIN — TAMSULOSIN HYDROCHLORIDE 0.8 MILLIGRAM(S): 0.4 CAPSULE ORAL at 21:35

## 2022-01-01 RX ADMIN — ALBUTEROL 2 PUFF(S): 90 AEROSOL, METERED ORAL at 06:17

## 2022-01-01 RX ADMIN — Medication 10 MILLILITER(S): at 12:50

## 2022-01-01 RX ADMIN — Medication 3 MILLILITER(S): at 11:17

## 2022-01-01 RX ADMIN — Medication 975 MILLIGRAM(S): at 06:02

## 2022-01-01 RX ADMIN — Medication 10 MILLILITER(S): at 05:33

## 2022-01-01 RX ADMIN — POLYETHYLENE GLYCOL 3350 17 GRAM(S): 17 POWDER, FOR SOLUTION ORAL at 17:41

## 2022-01-01 RX ADMIN — Medication 650 MILLIGRAM(S): at 17:34

## 2022-01-01 RX ADMIN — Medication 5 MILLIGRAM(S): at 22:09

## 2022-01-01 RX ADMIN — PANTOPRAZOLE SODIUM 40 MILLIGRAM(S): 20 TABLET, DELAYED RELEASE ORAL at 05:06

## 2022-01-01 RX ADMIN — Medication 1 TABLET(S): at 11:11

## 2022-01-01 RX ADMIN — PANTOPRAZOLE SODIUM 40 MILLIGRAM(S): 20 TABLET, DELAYED RELEASE ORAL at 03:56

## 2022-01-01 RX ADMIN — Medication 81 MILLIGRAM(S): at 13:45

## 2022-01-01 RX ADMIN — TAMSULOSIN HYDROCHLORIDE 0.8 MILLIGRAM(S): 0.4 CAPSULE ORAL at 22:56

## 2022-01-01 RX ADMIN — TRAMADOL HYDROCHLORIDE 25 MILLIGRAM(S): 50 TABLET ORAL at 08:18

## 2022-01-01 RX ADMIN — SODIUM CHLORIDE 4 MILLILITER(S): 9 INJECTION INTRAMUSCULAR; INTRAVENOUS; SUBCUTANEOUS at 17:52

## 2022-01-01 RX ADMIN — INSULIN GLARGINE 30 UNIT(S): 100 INJECTION, SOLUTION SUBCUTANEOUS at 21:10

## 2022-01-01 RX ADMIN — Medication 260 MILLIGRAM(S): at 21:59

## 2022-01-01 RX ADMIN — HEPARIN SODIUM 15 UNIT(S)/HR: 5000 INJECTION INTRAVENOUS; SUBCUTANEOUS at 19:30

## 2022-01-01 RX ADMIN — Medication 25 MICROGRAM(S): at 05:41

## 2022-01-01 RX ADMIN — Medication 7 UNIT(S): at 08:36

## 2022-01-01 RX ADMIN — Medication 20 MILLIEQUIVALENT(S): at 10:33

## 2022-01-01 RX ADMIN — PANTOPRAZOLE SODIUM 40 MILLIGRAM(S): 20 TABLET, DELAYED RELEASE ORAL at 04:19

## 2022-01-01 RX ADMIN — Medication 650 MILLIGRAM(S): at 15:33

## 2022-01-01 RX ADMIN — PIPERACILLIN AND TAZOBACTAM 200 GRAM(S): 4; .5 INJECTION, POWDER, LYOPHILIZED, FOR SOLUTION INTRAVENOUS at 20:53

## 2022-01-01 RX ADMIN — Medication 3 MILLILITER(S): at 20:40

## 2022-01-01 RX ADMIN — MONTELUKAST 10 MILLIGRAM(S): 4 TABLET, CHEWABLE ORAL at 14:13

## 2022-01-01 RX ADMIN — HEPARIN SODIUM 16 UNIT(S)/HR: 5000 INJECTION INTRAVENOUS; SUBCUTANEOUS at 08:02

## 2022-01-01 RX ADMIN — ATORVASTATIN CALCIUM 40 MILLIGRAM(S): 80 TABLET, FILM COATED ORAL at 21:08

## 2022-01-01 RX ADMIN — Medication 25 MILLIGRAM(S): at 18:54

## 2022-01-01 RX ADMIN — FINASTERIDE 5 MILLIGRAM(S): 5 TABLET, FILM COATED ORAL at 11:33

## 2022-01-01 RX ADMIN — HEPARIN SODIUM 14.5 UNIT(S)/HR: 5000 INJECTION INTRAVENOUS; SUBCUTANEOUS at 01:06

## 2022-01-01 RX ADMIN — MAGNESIUM OXIDE 400 MG ORAL TABLET 400 MILLIGRAM(S): 241.3 TABLET ORAL at 12:50

## 2022-01-01 RX ADMIN — Medication 975 MILLIGRAM(S): at 18:09

## 2022-01-01 RX ADMIN — Medication 25 MILLIGRAM(S): at 06:16

## 2022-01-01 RX ADMIN — Medication 3 MILLILITER(S): at 09:00

## 2022-01-01 RX ADMIN — Medication 0.5 MILLIGRAM(S): at 18:29

## 2022-01-01 RX ADMIN — Medication 975 MILLIGRAM(S): at 05:42

## 2022-01-01 RX ADMIN — Medication 50 MILLIGRAM(S): at 05:47

## 2022-01-01 RX ADMIN — Medication 4 MILLILITER(S): at 13:28

## 2022-01-01 RX ADMIN — FINASTERIDE 5 MILLIGRAM(S): 5 TABLET, FILM COATED ORAL at 12:21

## 2022-01-01 RX ADMIN — Medication 0.5 MILLIGRAM(S): at 21:15

## 2022-01-01 RX ADMIN — LOSARTAN POTASSIUM 100 MILLIGRAM(S): 100 TABLET, FILM COATED ORAL at 05:16

## 2022-01-01 RX ADMIN — GABAPENTIN 600 MILLIGRAM(S): 400 CAPSULE ORAL at 06:17

## 2022-01-01 RX ADMIN — HEPARIN SODIUM 12 UNIT(S)/HR: 5000 INJECTION INTRAVENOUS; SUBCUTANEOUS at 02:43

## 2022-01-01 RX ADMIN — Medication 20 MILLIGRAM(S): at 05:30

## 2022-01-01 RX ADMIN — HEPARIN SODIUM 12.5 UNIT(S)/HR: 5000 INJECTION INTRAVENOUS; SUBCUTANEOUS at 08:51

## 2022-01-01 RX ADMIN — GABAPENTIN 300 MILLIGRAM(S): 400 CAPSULE ORAL at 21:04

## 2022-01-01 RX ADMIN — Medication 3 MILLILITER(S): at 18:11

## 2022-01-01 RX ADMIN — Medication 3 MILLILITER(S): at 17:25

## 2022-01-01 RX ADMIN — Medication 650 MILLIGRAM(S): at 01:27

## 2022-01-01 RX ADMIN — Medication 50 MILLIGRAM(S): at 17:54

## 2022-01-01 RX ADMIN — PANTOPRAZOLE SODIUM 40 MILLIGRAM(S): 20 TABLET, DELAYED RELEASE ORAL at 06:52

## 2022-01-01 RX ADMIN — SENNA PLUS 2 TABLET(S): 8.6 TABLET ORAL at 21:08

## 2022-01-01 RX ADMIN — SENNA PLUS 2 TABLET(S): 8.6 TABLET ORAL at 21:34

## 2022-01-01 RX ADMIN — Medication 40 MILLIGRAM(S): at 03:07

## 2022-01-01 RX ADMIN — FINASTERIDE 5 MILLIGRAM(S): 5 TABLET, FILM COATED ORAL at 11:26

## 2022-01-01 RX ADMIN — Medication 100 MILLIGRAM(S): at 09:09

## 2022-01-01 RX ADMIN — GABAPENTIN 100 MILLIGRAM(S): 400 CAPSULE ORAL at 05:51

## 2022-01-01 RX ADMIN — ATORVASTATIN CALCIUM 40 MILLIGRAM(S): 80 TABLET, FILM COATED ORAL at 21:07

## 2022-01-01 RX ADMIN — Medication 650 MILLIGRAM(S): at 10:59

## 2022-01-01 RX ADMIN — Medication 4 MILLILITER(S): at 13:30

## 2022-01-01 RX ADMIN — INSULIN GLARGINE 13 UNIT(S): 100 INJECTION, SOLUTION SUBCUTANEOUS at 21:57

## 2022-01-01 RX ADMIN — Medication 2: at 09:01

## 2022-01-01 RX ADMIN — Medication 3 MILLILITER(S): at 17:04

## 2022-01-01 RX ADMIN — Medication 25 MILLIGRAM(S): at 05:21

## 2022-01-01 RX ADMIN — Medication 10 MILLILITER(S): at 12:20

## 2022-01-01 RX ADMIN — Medication 20 MICROGRAM(S): at 21:24

## 2022-01-01 RX ADMIN — Medication 10 MILLILITER(S): at 12:53

## 2022-01-01 RX ADMIN — ATORVASTATIN CALCIUM 40 MILLIGRAM(S): 80 TABLET, FILM COATED ORAL at 21:35

## 2022-01-01 RX ADMIN — SENNA PLUS 2 TABLET(S): 8.6 TABLET ORAL at 22:18

## 2022-01-01 RX ADMIN — TAMSULOSIN HYDROCHLORIDE 0.4 MILLIGRAM(S): 0.4 CAPSULE ORAL at 21:46

## 2022-01-01 RX ADMIN — PIPERACILLIN AND TAZOBACTAM 25 GRAM(S): 4; .5 INJECTION, POWDER, LYOPHILIZED, FOR SOLUTION INTRAVENOUS at 21:16

## 2022-01-01 RX ADMIN — MORPHINE SULFATE 2 MILLIGRAM(S): 50 CAPSULE, EXTENDED RELEASE ORAL at 17:20

## 2022-01-01 RX ADMIN — Medication 2: at 12:31

## 2022-01-01 RX ADMIN — GABAPENTIN 300 MILLIGRAM(S): 400 CAPSULE ORAL at 13:20

## 2022-01-01 RX ADMIN — Medication 0.5 MILLIGRAM(S): at 06:06

## 2022-01-01 RX ADMIN — MONTELUKAST 10 MILLIGRAM(S): 4 TABLET, CHEWABLE ORAL at 11:21

## 2022-01-01 RX ADMIN — LOSARTAN POTASSIUM 100 MILLIGRAM(S): 100 TABLET, FILM COATED ORAL at 05:29

## 2022-01-01 RX ADMIN — TAMSULOSIN HYDROCHLORIDE 0.8 MILLIGRAM(S): 0.4 CAPSULE ORAL at 21:17

## 2022-01-01 RX ADMIN — Medication 81 MILLIGRAM(S): at 11:03

## 2022-01-01 RX ADMIN — Medication 3 MILLILITER(S): at 21:51

## 2022-01-01 RX ADMIN — Medication 15 UNIT(S): at 17:14

## 2022-01-01 RX ADMIN — Medication 0.5 MILLIGRAM(S): at 17:46

## 2022-01-01 RX ADMIN — FINASTERIDE 5 MILLIGRAM(S): 5 TABLET, FILM COATED ORAL at 12:19

## 2022-01-01 RX ADMIN — PANTOPRAZOLE SODIUM 40 MILLIGRAM(S): 20 TABLET, DELAYED RELEASE ORAL at 03:09

## 2022-01-01 RX ADMIN — Medication 25 MICROGRAM(S): at 05:31

## 2022-01-01 RX ADMIN — Medication 3 MILLILITER(S): at 23:29

## 2022-01-01 RX ADMIN — HEPARIN SODIUM 14.5 UNIT(S)/HR: 5000 INJECTION INTRAVENOUS; SUBCUTANEOUS at 17:46

## 2022-01-01 RX ADMIN — Medication 975 MILLIGRAM(S): at 18:01

## 2022-01-01 RX ADMIN — Medication 1 ENEMA: at 00:57

## 2022-01-01 RX ADMIN — MONTELUKAST 10 MILLIGRAM(S): 4 TABLET, CHEWABLE ORAL at 11:43

## 2022-01-01 RX ADMIN — Medication 3 MILLILITER(S): at 15:28

## 2022-01-01 RX ADMIN — Medication 3 MILLILITER(S): at 18:18

## 2022-01-01 RX ADMIN — GABAPENTIN 300 MILLIGRAM(S): 400 CAPSULE ORAL at 22:47

## 2022-01-01 RX ADMIN — Medication 1 TABLET(S): at 13:21

## 2022-01-01 RX ADMIN — Medication 975 MILLIGRAM(S): at 17:45

## 2022-01-01 RX ADMIN — HEPARIN SODIUM 18 UNIT(S)/HR: 5000 INJECTION INTRAVENOUS; SUBCUTANEOUS at 09:37

## 2022-01-01 RX ADMIN — MONTELUKAST 10 MILLIGRAM(S): 4 TABLET, CHEWABLE ORAL at 11:32

## 2022-01-01 RX ADMIN — TAMSULOSIN HYDROCHLORIDE 0.4 MILLIGRAM(S): 0.4 CAPSULE ORAL at 22:51

## 2022-01-01 RX ADMIN — GABAPENTIN 300 MILLIGRAM(S): 400 CAPSULE ORAL at 06:33

## 2022-01-01 RX ADMIN — Medication 1200 MILLIGRAM(S): at 05:39

## 2022-01-01 RX ADMIN — Medication 100 MILLIEQUIVALENT(S): at 08:07

## 2022-01-01 RX ADMIN — Medication 15 UNIT(S): at 13:45

## 2022-01-01 RX ADMIN — Medication 650 MILLIGRAM(S): at 11:25

## 2022-01-01 RX ADMIN — Medication 3: at 13:14

## 2022-01-01 RX ADMIN — MONTELUKAST 10 MILLIGRAM(S): 4 TABLET, CHEWABLE ORAL at 12:33

## 2022-01-01 RX ADMIN — Medication 3 MILLILITER(S): at 22:57

## 2022-01-01 RX ADMIN — Medication 20 MILLIEQUIVALENT(S): at 09:53

## 2022-01-01 RX ADMIN — Medication 1200 MILLIGRAM(S): at 05:28

## 2022-01-01 RX ADMIN — Medication 3 MILLILITER(S): at 03:38

## 2022-01-01 RX ADMIN — Medication 3 MILLILITER(S): at 06:15

## 2022-01-01 RX ADMIN — PANTOPRAZOLE SODIUM 40 MILLIGRAM(S): 20 TABLET, DELAYED RELEASE ORAL at 05:30

## 2022-01-01 RX ADMIN — Medication 5 UNIT(S): at 12:33

## 2022-01-01 RX ADMIN — Medication 25 MILLIGRAM(S): at 05:10

## 2022-01-01 RX ADMIN — POLYETHYLENE GLYCOL 3350 17 GRAM(S): 17 POWDER, FOR SOLUTION ORAL at 18:29

## 2022-01-01 RX ADMIN — Medication 975 MILLIGRAM(S): at 06:15

## 2022-01-01 RX ADMIN — FINASTERIDE 5 MILLIGRAM(S): 5 TABLET, FILM COATED ORAL at 13:22

## 2022-01-01 RX ADMIN — Medication 7 UNIT(S): at 08:38

## 2022-01-01 RX ADMIN — Medication 20 MILLIGRAM(S): at 18:43

## 2022-01-01 RX ADMIN — Medication 3 UNIT(S): at 18:07

## 2022-01-01 RX ADMIN — GABAPENTIN 300 MILLIGRAM(S): 400 CAPSULE ORAL at 13:39

## 2022-01-01 RX ADMIN — Medication 40 MILLIGRAM(S): at 05:42

## 2022-01-01 RX ADMIN — Medication 1200 MILLIGRAM(S): at 17:38

## 2022-01-01 RX ADMIN — Medication 10 MILLILITER(S): at 01:13

## 2022-01-01 RX ADMIN — Medication 975 MILLIGRAM(S): at 23:35

## 2022-01-01 RX ADMIN — Medication 975 MILLIGRAM(S): at 05:06

## 2022-01-01 RX ADMIN — Medication 3 MILLILITER(S): at 14:09

## 2022-01-01 RX ADMIN — ATORVASTATIN CALCIUM 40 MILLIGRAM(S): 80 TABLET, FILM COATED ORAL at 22:47

## 2022-01-01 RX ADMIN — Medication 650 MILLIGRAM(S): at 21:45

## 2022-01-01 RX ADMIN — Medication 3: at 12:30

## 2022-01-01 RX ADMIN — Medication 1 APPLICATION(S): at 13:03

## 2022-01-01 RX ADMIN — MONTELUKAST 10 MILLIGRAM(S): 4 TABLET, CHEWABLE ORAL at 22:14

## 2022-01-01 RX ADMIN — SACUBITRIL AND VALSARTAN 1 TABLET(S): 24; 26 TABLET, FILM COATED ORAL at 18:01

## 2022-01-01 RX ADMIN — Medication 25 MICROGRAM(S): at 05:08

## 2022-01-01 RX ADMIN — Medication 100 MILLIGRAM(S): at 05:13

## 2022-01-01 RX ADMIN — OXYCODONE HYDROCHLORIDE 5 MILLIGRAM(S): 5 TABLET ORAL at 16:04

## 2022-01-01 RX ADMIN — OXYCODONE HYDROCHLORIDE 5 MILLIGRAM(S): 5 TABLET ORAL at 13:40

## 2022-01-01 RX ADMIN — Medication 20 MILLIGRAM(S): at 06:33

## 2022-01-01 RX ADMIN — Medication 0.5 MILLIGRAM(S): at 06:17

## 2022-01-01 RX ADMIN — INSULIN GLARGINE 10 UNIT(S): 100 INJECTION, SOLUTION SUBCUTANEOUS at 22:50

## 2022-01-01 RX ADMIN — Medication 25 MILLIGRAM(S): at 05:06

## 2022-01-01 RX ADMIN — Medication 650 MILLIGRAM(S): at 09:50

## 2022-01-01 RX ADMIN — HEPARIN SODIUM 14 UNIT(S)/HR: 5000 INJECTION INTRAVENOUS; SUBCUTANEOUS at 02:55

## 2022-01-01 RX ADMIN — POLYETHYLENE GLYCOL 3350 17 GRAM(S): 17 POWDER, FOR SOLUTION ORAL at 18:59

## 2022-01-01 RX ADMIN — Medication 975 MILLIGRAM(S): at 14:13

## 2022-01-01 RX ADMIN — Medication 650 MILLIGRAM(S): at 16:59

## 2022-01-01 RX ADMIN — Medication 25 MICROGRAM(S): at 05:04

## 2022-01-01 RX ADMIN — Medication 20 MILLIGRAM(S): at 05:14

## 2022-01-01 RX ADMIN — Medication 1200 MILLIGRAM(S): at 18:12

## 2022-01-01 RX ADMIN — Medication 975 MILLIGRAM(S): at 11:20

## 2022-01-01 RX ADMIN — Medication 100 MILLIEQUIVALENT(S): at 09:28

## 2022-01-01 RX ADMIN — Medication 650 MILLIGRAM(S): at 23:04

## 2022-01-01 RX ADMIN — INSULIN HUMAN 3 UNIT(S)/HR: 100 INJECTION, SOLUTION SUBCUTANEOUS at 08:02

## 2022-01-01 RX ADMIN — OXYCODONE HYDROCHLORIDE 5 MILLIGRAM(S): 5 TABLET ORAL at 08:30

## 2022-01-01 RX ADMIN — Medication 2: at 17:13

## 2022-01-01 RX ADMIN — Medication 650 MILLIGRAM(S): at 00:00

## 2022-01-01 RX ADMIN — Medication 3 MILLILITER(S): at 17:13

## 2022-01-01 RX ADMIN — Medication 25 MILLIGRAM(S): at 05:39

## 2022-01-01 RX ADMIN — Medication 975 MILLIGRAM(S): at 12:08

## 2022-01-01 RX ADMIN — Medication 975 MILLIGRAM(S): at 17:51

## 2022-01-01 RX ADMIN — MONTELUKAST 10 MILLIGRAM(S): 4 TABLET, CHEWABLE ORAL at 13:10

## 2022-01-01 RX ADMIN — HEPARIN SODIUM 4 UNIT(S)/HR: 5000 INJECTION INTRAVENOUS; SUBCUTANEOUS at 20:08

## 2022-01-01 RX ADMIN — Medication 2: at 13:23

## 2022-01-01 RX ADMIN — Medication 25 MICROGRAM(S): at 06:53

## 2022-01-01 RX ADMIN — OXYCODONE HYDROCHLORIDE 5 MILLIGRAM(S): 5 TABLET ORAL at 20:10

## 2022-01-01 RX ADMIN — ATORVASTATIN CALCIUM 40 MILLIGRAM(S): 80 TABLET, FILM COATED ORAL at 21:25

## 2022-01-01 RX ADMIN — Medication 20 MILLIGRAM(S): at 11:29

## 2022-01-01 RX ADMIN — Medication 975 MILLIGRAM(S): at 14:35

## 2022-01-01 RX ADMIN — Medication 3 MILLILITER(S): at 05:14

## 2022-01-01 RX ADMIN — Medication 2: at 18:23

## 2022-01-01 RX ADMIN — MONTELUKAST 10 MILLIGRAM(S): 4 TABLET, CHEWABLE ORAL at 12:40

## 2022-01-01 RX ADMIN — Medication 81 MILLIGRAM(S): at 11:17

## 2022-01-01 RX ADMIN — Medication 12 UNIT(S): at 12:53

## 2022-01-01 RX ADMIN — Medication 5 UNIT(S): at 13:36

## 2022-01-01 RX ADMIN — ATORVASTATIN CALCIUM 40 MILLIGRAM(S): 80 TABLET, FILM COATED ORAL at 21:01

## 2022-01-01 RX ADMIN — Medication 25 MICROGRAM(S): at 05:14

## 2022-01-01 RX ADMIN — Medication 20 MILLIGRAM(S): at 05:06

## 2022-01-01 RX ADMIN — Medication 650 MILLIGRAM(S): at 10:03

## 2022-01-01 RX ADMIN — TAMSULOSIN HYDROCHLORIDE 0.8 MILLIGRAM(S): 0.4 CAPSULE ORAL at 21:50

## 2022-01-01 RX ADMIN — Medication 650 MILLIGRAM(S): at 16:25

## 2022-01-01 RX ADMIN — HEPARIN SODIUM 15.5 UNIT(S)/HR: 5000 INJECTION INTRAVENOUS; SUBCUTANEOUS at 23:17

## 2022-01-01 RX ADMIN — POLYETHYLENE GLYCOL 3350 17 GRAM(S): 17 POWDER, FOR SOLUTION ORAL at 11:49

## 2022-01-01 RX ADMIN — MORPHINE SULFATE 2 MILLIGRAM(S): 50 CAPSULE, EXTENDED RELEASE ORAL at 23:43

## 2022-01-01 RX ADMIN — PIPERACILLIN AND TAZOBACTAM 25 GRAM(S): 4; .5 INJECTION, POWDER, LYOPHILIZED, FOR SOLUTION INTRAVENOUS at 16:00

## 2022-01-01 RX ADMIN — Medication 325 MILLIGRAM(S): at 00:28

## 2022-01-01 RX ADMIN — Medication 1 TABLET(S): at 11:26

## 2022-01-01 RX ADMIN — PANTOPRAZOLE SODIUM 40 MILLIGRAM(S): 20 TABLET, DELAYED RELEASE ORAL at 06:13

## 2022-01-01 RX ADMIN — GABAPENTIN 300 MILLIGRAM(S): 400 CAPSULE ORAL at 13:51

## 2022-01-01 RX ADMIN — PIPERACILLIN AND TAZOBACTAM 200 GRAM(S): 4; .5 INJECTION, POWDER, LYOPHILIZED, FOR SOLUTION INTRAVENOUS at 09:04

## 2022-01-01 RX ADMIN — Medication 0.5 MILLIGRAM(S): at 21:18

## 2022-01-01 RX ADMIN — Medication 975 MILLIGRAM(S): at 11:19

## 2022-01-01 RX ADMIN — Medication 1200 MILLIGRAM(S): at 17:57

## 2022-01-01 RX ADMIN — Medication 25 MILLIGRAM(S): at 06:43

## 2022-01-01 RX ADMIN — SENNA PLUS 2 TABLET(S): 8.6 TABLET ORAL at 22:55

## 2022-01-01 RX ADMIN — Medication 975 MILLIGRAM(S): at 17:04

## 2022-01-01 RX ADMIN — Medication 3 MILLILITER(S): at 18:06

## 2022-01-01 RX ADMIN — OXYCODONE HYDROCHLORIDE 5 MILLIGRAM(S): 5 TABLET ORAL at 14:11

## 2022-01-01 RX ADMIN — PANTOPRAZOLE SODIUM 40 MILLIGRAM(S): 20 TABLET, DELAYED RELEASE ORAL at 05:23

## 2022-01-01 RX ADMIN — FINASTERIDE 5 MILLIGRAM(S): 5 TABLET, FILM COATED ORAL at 12:58

## 2022-01-01 RX ADMIN — Medication 40 MILLIGRAM(S): at 06:23

## 2022-01-01 RX ADMIN — PANTOPRAZOLE SODIUM 40 MILLIGRAM(S): 20 TABLET, DELAYED RELEASE ORAL at 05:22

## 2022-01-01 RX ADMIN — OXYCODONE HYDROCHLORIDE 5 MILLIGRAM(S): 5 TABLET ORAL at 15:10

## 2022-01-01 RX ADMIN — Medication 3 MILLILITER(S): at 11:30

## 2022-01-01 RX ADMIN — Medication 1200 MILLIGRAM(S): at 05:08

## 2022-01-01 RX ADMIN — Medication 1200 MILLIGRAM(S): at 05:06

## 2022-01-01 RX ADMIN — Medication 975 MILLIGRAM(S): at 11:30

## 2022-01-01 RX ADMIN — OXYCODONE HYDROCHLORIDE 5 MILLIGRAM(S): 5 TABLET ORAL at 18:31

## 2022-01-01 RX ADMIN — Medication 25 MILLIGRAM(S): at 18:01

## 2022-01-01 RX ADMIN — FINASTERIDE 5 MILLIGRAM(S): 5 TABLET, FILM COATED ORAL at 15:10

## 2022-01-01 RX ADMIN — SENNA PLUS 2 TABLET(S): 8.6 TABLET ORAL at 22:15

## 2022-01-01 RX ADMIN — Medication 1000 MILLIGRAM(S): at 22:34

## 2022-01-01 RX ADMIN — Medication 0.5 MILLIGRAM(S): at 06:42

## 2022-01-01 RX ADMIN — OXYCODONE HYDROCHLORIDE 5 MILLIGRAM(S): 5 TABLET ORAL at 00:15

## 2022-01-01 RX ADMIN — Medication 2: at 06:58

## 2022-01-01 RX ADMIN — Medication 1200 MILLIGRAM(S): at 05:15

## 2022-01-01 RX ADMIN — Medication 975 MILLIGRAM(S): at 00:02

## 2022-01-01 RX ADMIN — Medication 10 MILLILITER(S): at 23:35

## 2022-01-01 RX ADMIN — Medication 975 MILLIGRAM(S): at 11:33

## 2022-01-01 RX ADMIN — Medication 15 UNIT(S): at 17:50

## 2022-01-01 RX ADMIN — Medication 3 MILLILITER(S): at 17:46

## 2022-01-01 RX ADMIN — Medication 50 MILLIGRAM(S): at 10:41

## 2022-01-01 RX ADMIN — Medication 7 UNIT(S): at 08:47

## 2022-01-01 RX ADMIN — Medication 40 MILLIGRAM(S): at 21:28

## 2022-01-01 RX ADMIN — Medication 10 UNIT(S): at 13:05

## 2022-01-01 RX ADMIN — INSULIN GLARGINE 24 UNIT(S): 100 INJECTION, SOLUTION SUBCUTANEOUS at 22:11

## 2022-01-01 RX ADMIN — FENTANYL CITRATE 25 MICROGRAM(S): 50 INJECTION INTRAVENOUS at 15:12

## 2022-01-01 RX ADMIN — GABAPENTIN 300 MILLIGRAM(S): 400 CAPSULE ORAL at 15:03

## 2022-01-01 RX ADMIN — Medication 1200 MILLIGRAM(S): at 17:07

## 2022-01-01 RX ADMIN — Medication 400 MILLIGRAM(S): at 11:35

## 2022-01-01 RX ADMIN — Medication 25 MICROGRAM(S): at 05:06

## 2022-01-01 RX ADMIN — GABAPENTIN 300 MILLIGRAM(S): 400 CAPSULE ORAL at 21:16

## 2022-01-01 RX ADMIN — FINASTERIDE 5 MILLIGRAM(S): 5 TABLET, FILM COATED ORAL at 11:21

## 2022-01-01 RX ADMIN — Medication 20 MILLIEQUIVALENT(S): at 14:10

## 2022-01-01 RX ADMIN — Medication 20 UNIT(S): at 09:26

## 2022-01-01 RX ADMIN — Medication 0.5 MILLIGRAM(S): at 18:27

## 2022-01-01 RX ADMIN — GABAPENTIN 300 MILLIGRAM(S): 400 CAPSULE ORAL at 13:15

## 2022-01-01 RX ADMIN — Medication 1200 MILLIGRAM(S): at 17:45

## 2022-01-01 RX ADMIN — Medication 3 MILLILITER(S): at 18:40

## 2022-01-01 RX ADMIN — Medication 50 MILLIGRAM(S): at 05:30

## 2022-01-01 RX ADMIN — PIPERACILLIN AND TAZOBACTAM 25 GRAM(S): 4; .5 INJECTION, POWDER, LYOPHILIZED, FOR SOLUTION INTRAVENOUS at 14:00

## 2022-01-01 RX ADMIN — GABAPENTIN 300 MILLIGRAM(S): 400 CAPSULE ORAL at 12:13

## 2022-01-01 RX ADMIN — TAMSULOSIN HYDROCHLORIDE 0.8 MILLIGRAM(S): 0.4 CAPSULE ORAL at 23:04

## 2022-01-01 RX ADMIN — Medication 12 UNIT(S): at 17:50

## 2022-01-01 RX ADMIN — Medication 975 MILLIGRAM(S): at 11:18

## 2022-01-01 RX ADMIN — Medication 975 MILLIGRAM(S): at 18:22

## 2022-01-01 RX ADMIN — Medication 1 TABLET(S): at 13:53

## 2022-01-01 RX ADMIN — Medication 3 MILLILITER(S): at 12:26

## 2022-01-01 RX ADMIN — PIPERACILLIN AND TAZOBACTAM 25 GRAM(S): 4; .5 INJECTION, POWDER, LYOPHILIZED, FOR SOLUTION INTRAVENOUS at 06:57

## 2022-01-01 RX ADMIN — Medication 20 MILLIGRAM(S): at 06:23

## 2022-01-01 RX ADMIN — Medication 2: at 21:34

## 2022-01-01 RX ADMIN — Medication 3 MILLILITER(S): at 05:41

## 2022-01-01 RX ADMIN — INSULIN GLARGINE 24 UNIT(S): 100 INJECTION, SOLUTION SUBCUTANEOUS at 23:35

## 2022-01-01 RX ADMIN — Medication 1 APPLICATION(S): at 12:22

## 2022-01-01 RX ADMIN — Medication 0.5 MILLIGRAM(S): at 21:50

## 2022-01-01 RX ADMIN — TRAMADOL HYDROCHLORIDE 50 MILLIGRAM(S): 50 TABLET ORAL at 01:17

## 2022-01-01 RX ADMIN — GABAPENTIN 300 MILLIGRAM(S): 400 CAPSULE ORAL at 21:01

## 2022-01-01 RX ADMIN — HEPARIN SODIUM 14.5 UNIT(S)/HR: 5000 INJECTION INTRAVENOUS; SUBCUTANEOUS at 08:46

## 2022-01-01 RX ADMIN — Medication 1200 MILLIGRAM(S): at 05:37

## 2022-01-01 RX ADMIN — Medication 3: at 09:01

## 2022-01-01 RX ADMIN — Medication 1 TABLET(S): at 11:50

## 2022-01-01 RX ADMIN — PIPERACILLIN AND TAZOBACTAM 25 GRAM(S): 4; .5 INJECTION, POWDER, LYOPHILIZED, FOR SOLUTION INTRAVENOUS at 22:46

## 2022-01-01 RX ADMIN — ENOXAPARIN SODIUM 90 MILLIGRAM(S): 100 INJECTION SUBCUTANEOUS at 17:33

## 2022-01-01 RX ADMIN — Medication 3 MILLILITER(S): at 22:29

## 2022-01-01 RX ADMIN — Medication 12 UNIT(S): at 12:20

## 2022-01-01 RX ADMIN — Medication 10 MILLILITER(S): at 06:54

## 2022-01-01 RX ADMIN — Medication 650 MILLIGRAM(S): at 12:55

## 2022-01-01 RX ADMIN — ATORVASTATIN CALCIUM 80 MILLIGRAM(S): 80 TABLET, FILM COATED ORAL at 22:00

## 2022-01-01 RX ADMIN — ROBINUL 0.1 MILLIGRAM(S): 0.2 INJECTION INTRAMUSCULAR; INTRAVENOUS at 18:38

## 2022-01-01 RX ADMIN — INSULIN GLARGINE 10 UNIT(S): 100 INJECTION, SOLUTION SUBCUTANEOUS at 22:08

## 2022-01-01 RX ADMIN — Medication 0.5 MILLIGRAM(S): at 18:18

## 2022-01-01 RX ADMIN — Medication 1: at 10:10

## 2022-01-01 RX ADMIN — Medication 6: at 13:29

## 2022-01-01 RX ADMIN — SACUBITRIL AND VALSARTAN 1 TABLET(S): 24; 26 TABLET, FILM COATED ORAL at 17:43

## 2022-01-01 RX ADMIN — OXYCODONE HYDROCHLORIDE 5 MILLIGRAM(S): 5 TABLET ORAL at 07:01

## 2022-01-01 RX ADMIN — TAMSULOSIN HYDROCHLORIDE 0.4 MILLIGRAM(S): 0.4 CAPSULE ORAL at 22:33

## 2022-01-01 RX ADMIN — Medication 1 TABLET(S): at 12:21

## 2022-01-01 RX ADMIN — Medication 1 APPLICATION(S): at 13:07

## 2022-01-01 RX ADMIN — Medication 20 MILLIGRAM(S): at 05:16

## 2022-01-01 RX ADMIN — TAMSULOSIN HYDROCHLORIDE 0.8 MILLIGRAM(S): 0.4 CAPSULE ORAL at 21:38

## 2022-01-01 RX ADMIN — POLYETHYLENE GLYCOL 3350 17 GRAM(S): 17 POWDER, FOR SOLUTION ORAL at 12:21

## 2022-01-01 RX ADMIN — Medication 650 MILLIGRAM(S): at 01:07

## 2022-01-01 RX ADMIN — Medication 0.5 MILLIGRAM(S): at 09:22

## 2022-01-01 RX ADMIN — Medication 1 TABLET(S): at 12:00

## 2022-01-01 RX ADMIN — Medication 2: at 12:25

## 2022-01-01 RX ADMIN — GABAPENTIN 100 MILLIGRAM(S): 400 CAPSULE ORAL at 05:16

## 2022-01-01 RX ADMIN — Medication 975 MILLIGRAM(S): at 05:14

## 2022-01-01 RX ADMIN — Medication 4 MILLILITER(S): at 05:47

## 2022-01-01 RX ADMIN — Medication 25 MICROGRAM(S): at 06:10

## 2022-01-01 RX ADMIN — Medication 650 MILLIGRAM(S): at 12:24

## 2022-01-01 RX ADMIN — Medication 10 MILLILITER(S): at 13:22

## 2022-01-01 RX ADMIN — TRAMADOL HYDROCHLORIDE 50 MILLIGRAM(S): 50 TABLET ORAL at 05:10

## 2022-01-01 RX ADMIN — PIPERACILLIN AND TAZOBACTAM 25 GRAM(S): 4; .5 INJECTION, POWDER, LYOPHILIZED, FOR SOLUTION INTRAVENOUS at 05:11

## 2022-01-01 RX ADMIN — Medication 81 MILLIGRAM(S): at 10:49

## 2022-01-01 RX ADMIN — Medication 10 MILLILITER(S): at 22:58

## 2022-01-01 RX ADMIN — Medication 3 MILLILITER(S): at 17:12

## 2022-01-01 RX ADMIN — Medication 25 MILLIGRAM(S): at 06:52

## 2022-01-01 RX ADMIN — GABAPENTIN 600 MILLIGRAM(S): 400 CAPSULE ORAL at 21:43

## 2022-01-01 RX ADMIN — Medication 3 MILLILITER(S): at 08:40

## 2022-01-01 RX ADMIN — Medication 10 MILLILITER(S): at 17:43

## 2022-01-01 RX ADMIN — Medication 50 MILLIGRAM(S): at 05:50

## 2022-01-01 RX ADMIN — Medication 1: at 09:33

## 2022-01-01 RX ADMIN — Medication 1 APPLICATION(S): at 13:18

## 2022-01-01 RX ADMIN — Medication 40 MILLIGRAM(S): at 17:38

## 2022-01-01 RX ADMIN — Medication 20 UNIT(S): at 08:07

## 2022-01-01 RX ADMIN — TAMSULOSIN HYDROCHLORIDE 0.8 MILLIGRAM(S): 0.4 CAPSULE ORAL at 22:08

## 2022-01-01 RX ADMIN — HEPARIN SODIUM 15 UNIT(S)/HR: 5000 INJECTION INTRAVENOUS; SUBCUTANEOUS at 16:01

## 2022-01-01 RX ADMIN — Medication 3 MILLILITER(S): at 09:22

## 2022-01-01 RX ADMIN — HEPARIN SODIUM 18 UNIT(S)/HR: 5000 INJECTION INTRAVENOUS; SUBCUTANEOUS at 07:16

## 2022-01-01 RX ADMIN — Medication 20 MILLIGRAM(S): at 13:44

## 2022-01-01 RX ADMIN — Medication 1: at 21:59

## 2022-01-01 RX ADMIN — Medication 0.5 MILLIGRAM(S): at 05:32

## 2022-01-01 RX ADMIN — ENOXAPARIN SODIUM 90 MILLIGRAM(S): 100 INJECTION SUBCUTANEOUS at 05:22

## 2022-01-01 RX ADMIN — HEPARIN SODIUM 16 UNIT(S)/HR: 5000 INJECTION INTRAVENOUS; SUBCUTANEOUS at 13:54

## 2022-01-01 RX ADMIN — Medication 20 MICROGRAM(S): at 23:17

## 2022-01-01 RX ADMIN — Medication 3 MILLILITER(S): at 23:00

## 2022-01-01 RX ADMIN — Medication 20 MILLIGRAM(S): at 05:08

## 2022-01-01 RX ADMIN — HEPARIN SODIUM 15.5 UNIT(S)/HR: 5000 INJECTION INTRAVENOUS; SUBCUTANEOUS at 10:55

## 2022-01-01 RX ADMIN — Medication 3 MILLILITER(S): at 18:03

## 2022-01-01 RX ADMIN — HEPARIN SODIUM 15.5 UNIT(S)/HR: 5000 INJECTION INTRAVENOUS; SUBCUTANEOUS at 07:43

## 2022-01-01 RX ADMIN — HEPARIN SODIUM 12 UNIT(S)/HR: 5000 INJECTION INTRAVENOUS; SUBCUTANEOUS at 19:52

## 2022-01-01 RX ADMIN — WARFARIN SODIUM 3 MILLIGRAM(S): 2.5 TABLET ORAL at 22:59

## 2022-01-01 RX ADMIN — Medication 975 MILLIGRAM(S): at 12:20

## 2022-01-01 RX ADMIN — Medication 3 MILLILITER(S): at 15:15

## 2022-01-01 RX ADMIN — CEFTRIAXONE 100 MILLIGRAM(S): 500 INJECTION, POWDER, FOR SOLUTION INTRAMUSCULAR; INTRAVENOUS at 09:04

## 2022-01-01 RX ADMIN — ENOXAPARIN SODIUM 90 MILLIGRAM(S): 100 INJECTION SUBCUTANEOUS at 18:11

## 2022-01-01 RX ADMIN — Medication 5: at 09:08

## 2022-01-01 RX ADMIN — ENOXAPARIN SODIUM 90 MILLIGRAM(S): 100 INJECTION SUBCUTANEOUS at 18:19

## 2022-01-01 RX ADMIN — PIPERACILLIN AND TAZOBACTAM 25 GRAM(S): 4; .5 INJECTION, POWDER, LYOPHILIZED, FOR SOLUTION INTRAVENOUS at 06:32

## 2022-01-01 RX ADMIN — MONTELUKAST 10 MILLIGRAM(S): 4 TABLET, CHEWABLE ORAL at 22:57

## 2022-01-01 RX ADMIN — WARFARIN SODIUM 7.5 MILLIGRAM(S): 2.5 TABLET ORAL at 22:02

## 2022-01-01 RX ADMIN — Medication 20 MILLIGRAM(S): at 21:25

## 2022-01-01 RX ADMIN — GABAPENTIN 100 MILLIGRAM(S): 400 CAPSULE ORAL at 09:07

## 2022-01-01 RX ADMIN — LOSARTAN POTASSIUM 100 MILLIGRAM(S): 100 TABLET, FILM COATED ORAL at 05:25

## 2022-01-01 RX ADMIN — Medication 6: at 13:39

## 2022-01-01 RX ADMIN — Medication 30 MILLIGRAM(S): at 05:21

## 2022-01-01 RX ADMIN — Medication 975 MILLIGRAM(S): at 14:17

## 2022-01-01 RX ADMIN — Medication 3 MILLILITER(S): at 09:06

## 2022-01-01 RX ADMIN — FINASTERIDE 5 MILLIGRAM(S): 5 TABLET, FILM COATED ORAL at 12:35

## 2022-01-01 RX ADMIN — Medication 40 MILLIEQUIVALENT(S): at 17:33

## 2022-01-01 RX ADMIN — Medication 50 MILLIGRAM(S): at 17:51

## 2022-01-01 RX ADMIN — Medication 1 TABLET(S): at 13:14

## 2022-01-01 RX ADMIN — Medication 25 MILLIGRAM(S): at 06:45

## 2022-01-01 RX ADMIN — Medication 20 MILLIGRAM(S): at 18:16

## 2022-01-01 RX ADMIN — PANTOPRAZOLE SODIUM 40 MILLIGRAM(S): 20 TABLET, DELAYED RELEASE ORAL at 06:33

## 2022-01-01 RX ADMIN — Medication 20 MILLIGRAM(S): at 05:03

## 2022-01-01 RX ADMIN — Medication 3 MILLILITER(S): at 04:20

## 2022-01-01 RX ADMIN — Medication 0.5 MILLIGRAM(S): at 17:29

## 2022-01-01 RX ADMIN — Medication 20 MILLIGRAM(S): at 19:25

## 2022-01-01 RX ADMIN — CHLORHEXIDINE GLUCONATE 1 APPLICATION(S): 213 SOLUTION TOPICAL at 08:20

## 2022-01-01 RX ADMIN — PANTOPRAZOLE SODIUM 40 MILLIGRAM(S): 20 TABLET, DELAYED RELEASE ORAL at 05:25

## 2022-01-01 RX ADMIN — Medication 975 MILLIGRAM(S): at 17:52

## 2022-01-01 RX ADMIN — Medication 50 MILLIGRAM(S): at 05:16

## 2022-01-01 RX ADMIN — Medication 1: at 17:58

## 2022-01-01 RX ADMIN — LOSARTAN POTASSIUM 100 MILLIGRAM(S): 100 TABLET, FILM COATED ORAL at 06:06

## 2022-01-01 RX ADMIN — Medication 1 TABLET(S): at 18:48

## 2022-01-01 RX ADMIN — HEPARIN SODIUM 12 UNIT(S)/HR: 5000 INJECTION INTRAVENOUS; SUBCUTANEOUS at 05:23

## 2022-01-01 RX ADMIN — Medication 0.5 MILLIGRAM(S): at 05:53

## 2022-01-01 RX ADMIN — Medication 100 MILLIGRAM(S): at 18:28

## 2022-01-01 RX ADMIN — Medication 25 MICROGRAM(S): at 06:52

## 2022-01-01 RX ADMIN — GABAPENTIN 600 MILLIGRAM(S): 400 CAPSULE ORAL at 13:58

## 2022-01-01 RX ADMIN — Medication 3 MILLILITER(S): at 06:02

## 2022-01-01 RX ADMIN — SODIUM CHLORIDE 4 MILLILITER(S): 9 INJECTION INTRAMUSCULAR; INTRAVENOUS; SUBCUTANEOUS at 17:43

## 2022-01-01 RX ADMIN — TAMSULOSIN HYDROCHLORIDE 0.8 MILLIGRAM(S): 0.4 CAPSULE ORAL at 21:08

## 2022-01-01 RX ADMIN — Medication 975 MILLIGRAM(S): at 12:25

## 2022-01-01 RX ADMIN — INSULIN GLARGINE 10 UNIT(S): 100 INJECTION, SOLUTION SUBCUTANEOUS at 22:48

## 2022-01-01 RX ADMIN — PANTOPRAZOLE SODIUM 40 MILLIGRAM(S): 20 TABLET, DELAYED RELEASE ORAL at 06:23

## 2022-01-01 RX ADMIN — POLYETHYLENE GLYCOL 3350 17 GRAM(S): 17 POWDER, FOR SOLUTION ORAL at 11:15

## 2022-01-01 RX ADMIN — Medication 25 MICROGRAM(S): at 05:17

## 2022-01-01 RX ADMIN — Medication 20 MICROGRAM(S): at 21:54

## 2022-01-01 RX ADMIN — Medication 1 TABLET(S): at 11:43

## 2022-01-01 RX ADMIN — PIPERACILLIN AND TAZOBACTAM 25 GRAM(S): 4; .5 INJECTION, POWDER, LYOPHILIZED, FOR SOLUTION INTRAVENOUS at 14:40

## 2022-01-01 RX ADMIN — TAMSULOSIN HYDROCHLORIDE 0.8 MILLIGRAM(S): 0.4 CAPSULE ORAL at 22:47

## 2022-01-01 RX ADMIN — Medication 975 MILLIGRAM(S): at 05:28

## 2022-01-01 RX ADMIN — Medication 6 UNIT(S): at 09:21

## 2022-01-01 RX ADMIN — Medication 975 MILLIGRAM(S): at 06:00

## 2022-01-01 RX ADMIN — Medication 50 MILLIGRAM(S): at 06:54

## 2022-01-01 RX ADMIN — PIPERACILLIN AND TAZOBACTAM 25 GRAM(S): 4; .5 INJECTION, POWDER, LYOPHILIZED, FOR SOLUTION INTRAVENOUS at 06:52

## 2022-01-01 RX ADMIN — PANTOPRAZOLE SODIUM 40 MILLIGRAM(S): 20 TABLET, DELAYED RELEASE ORAL at 06:30

## 2022-01-01 RX ADMIN — Medication 975 MILLIGRAM(S): at 05:22

## 2022-01-01 RX ADMIN — Medication 1200 MILLIGRAM(S): at 06:52

## 2022-01-01 RX ADMIN — Medication 3 MILLILITER(S): at 00:37

## 2022-01-01 RX ADMIN — Medication 20 MICROGRAM(S): at 22:43

## 2022-01-01 RX ADMIN — Medication 3 MILLILITER(S): at 04:04

## 2022-01-01 RX ADMIN — Medication 20 MICROGRAM(S): at 01:34

## 2022-01-01 RX ADMIN — HEPARIN SODIUM 12 UNIT(S)/HR: 5000 INJECTION INTRAVENOUS; SUBCUTANEOUS at 13:04

## 2022-01-01 RX ADMIN — Medication 3 MILLILITER(S): at 05:13

## 2022-01-01 RX ADMIN — Medication 24 UNIT(S): at 09:12

## 2022-01-01 RX ADMIN — PANTOPRAZOLE SODIUM 40 MILLIGRAM(S): 20 TABLET, DELAYED RELEASE ORAL at 05:10

## 2022-01-01 RX ADMIN — PIPERACILLIN AND TAZOBACTAM 25 GRAM(S): 4; .5 INJECTION, POWDER, LYOPHILIZED, FOR SOLUTION INTRAVENOUS at 22:59

## 2022-01-01 RX ADMIN — SACUBITRIL AND VALSARTAN 1 TABLET(S): 24; 26 TABLET, FILM COATED ORAL at 06:36

## 2022-01-01 RX ADMIN — Medication 6: at 18:05

## 2022-01-01 RX ADMIN — Medication 20 MILLIGRAM(S): at 05:51

## 2022-01-01 RX ADMIN — IRON SUCROSE 200 MILLIGRAM(S): 20 INJECTION, SOLUTION INTRAVENOUS at 22:51

## 2022-01-01 RX ADMIN — PIPERACILLIN AND TAZOBACTAM 25 GRAM(S): 4; .5 INJECTION, POWDER, LYOPHILIZED, FOR SOLUTION INTRAVENOUS at 05:27

## 2022-01-01 RX ADMIN — Medication 3 MILLILITER(S): at 22:21

## 2022-01-01 RX ADMIN — PIPERACILLIN AND TAZOBACTAM 25 GRAM(S): 4; .5 INJECTION, POWDER, LYOPHILIZED, FOR SOLUTION INTRAVENOUS at 13:27

## 2022-01-01 RX ADMIN — PIPERACILLIN AND TAZOBACTAM 25 GRAM(S): 4; .5 INJECTION, POWDER, LYOPHILIZED, FOR SOLUTION INTRAVENOUS at 22:31

## 2022-01-01 RX ADMIN — PIPERACILLIN AND TAZOBACTAM 25 GRAM(S): 4; .5 INJECTION, POWDER, LYOPHILIZED, FOR SOLUTION INTRAVENOUS at 06:31

## 2022-01-01 RX ADMIN — Medication 1: at 08:32

## 2022-01-01 RX ADMIN — OXYCODONE HYDROCHLORIDE 10 MILLIGRAM(S): 5 TABLET ORAL at 10:28

## 2022-01-01 RX ADMIN — Medication 40 MILLIGRAM(S): at 01:48

## 2022-01-01 RX ADMIN — MONTELUKAST 10 MILLIGRAM(S): 4 TABLET, CHEWABLE ORAL at 16:07

## 2022-01-01 RX ADMIN — Medication 650 MILLIGRAM(S): at 18:31

## 2022-01-01 RX ADMIN — TRAMADOL HYDROCHLORIDE 50 MILLIGRAM(S): 50 TABLET ORAL at 20:16

## 2022-01-01 RX ADMIN — Medication 20 MILLIGRAM(S): at 05:28

## 2022-01-01 RX ADMIN — Medication 25 MILLIGRAM(S): at 05:29

## 2022-01-01 RX ADMIN — Medication 975 MILLIGRAM(S): at 07:22

## 2022-01-01 RX ADMIN — Medication 3 MILLILITER(S): at 23:35

## 2022-01-01 RX ADMIN — MONTELUKAST 10 MILLIGRAM(S): 4 TABLET, CHEWABLE ORAL at 12:59

## 2022-01-01 RX ADMIN — OXYCODONE HYDROCHLORIDE 5 MILLIGRAM(S): 5 TABLET ORAL at 05:55

## 2022-01-01 RX ADMIN — Medication 2: at 17:42

## 2022-01-01 RX ADMIN — Medication 0.5 MILLIGRAM(S): at 20:40

## 2022-01-01 RX ADMIN — Medication 650 MILLIGRAM(S): at 21:51

## 2022-01-01 RX ADMIN — GABAPENTIN 300 MILLIGRAM(S): 400 CAPSULE ORAL at 05:43

## 2022-01-01 RX ADMIN — TRAMADOL HYDROCHLORIDE 50 MILLIGRAM(S): 50 TABLET ORAL at 11:32

## 2022-01-01 RX ADMIN — HYDROMORPHONE HYDROCHLORIDE 0.5 MILLIGRAM(S): 2 INJECTION INTRAMUSCULAR; INTRAVENOUS; SUBCUTANEOUS at 10:31

## 2022-01-01 RX ADMIN — TAMSULOSIN HYDROCHLORIDE 0.4 MILLIGRAM(S): 0.4 CAPSULE ORAL at 21:39

## 2022-01-01 RX ADMIN — Medication 25 MILLIGRAM(S): at 05:51

## 2022-01-01 RX ADMIN — GABAPENTIN 100 MILLIGRAM(S): 400 CAPSULE ORAL at 13:30

## 2022-01-01 RX ADMIN — ATORVASTATIN CALCIUM 40 MILLIGRAM(S): 80 TABLET, FILM COATED ORAL at 22:09

## 2022-01-01 RX ADMIN — TRAMADOL HYDROCHLORIDE 25 MILLIGRAM(S): 50 TABLET ORAL at 05:37

## 2022-01-01 RX ADMIN — HEPARIN SODIUM 12 UNIT(S)/HR: 5000 INJECTION INTRAVENOUS; SUBCUTANEOUS at 08:26

## 2022-01-01 RX ADMIN — OXYCODONE HYDROCHLORIDE 5 MILLIGRAM(S): 5 TABLET ORAL at 08:00

## 2022-01-01 RX ADMIN — Medication 20 MICROGRAM(S): at 22:48

## 2022-01-01 RX ADMIN — ATORVASTATIN CALCIUM 80 MILLIGRAM(S): 80 TABLET, FILM COATED ORAL at 21:24

## 2022-01-01 RX ADMIN — TRAMADOL HYDROCHLORIDE 50 MILLIGRAM(S): 50 TABLET ORAL at 07:01

## 2022-01-01 RX ADMIN — Medication 4 MILLILITER(S): at 22:09

## 2022-01-01 RX ADMIN — GABAPENTIN 100 MILLIGRAM(S): 400 CAPSULE ORAL at 05:25

## 2022-01-01 RX ADMIN — Medication 25 MICROGRAM(S): at 05:07

## 2022-01-01 RX ADMIN — Medication 3 MILLILITER(S): at 13:39

## 2022-01-01 RX ADMIN — ATORVASTATIN CALCIUM 40 MILLIGRAM(S): 80 TABLET, FILM COATED ORAL at 22:56

## 2022-01-01 RX ADMIN — PIPERACILLIN AND TAZOBACTAM 25 GRAM(S): 4; .5 INJECTION, POWDER, LYOPHILIZED, FOR SOLUTION INTRAVENOUS at 13:11

## 2022-01-01 RX ADMIN — Medication 20 MILLIGRAM(S): at 06:11

## 2022-01-01 RX ADMIN — Medication 20 MILLIGRAM(S): at 11:20

## 2022-01-01 RX ADMIN — OXYCODONE HYDROCHLORIDE 5 MILLIGRAM(S): 5 TABLET ORAL at 02:17

## 2022-01-01 RX ADMIN — INSULIN GLARGINE 30 UNIT(S): 100 INJECTION, SOLUTION SUBCUTANEOUS at 21:58

## 2022-01-01 RX ADMIN — TRAMADOL HYDROCHLORIDE 50 MILLIGRAM(S): 50 TABLET ORAL at 13:14

## 2022-01-01 RX ADMIN — Medication 0.5 MILLIGRAM(S): at 05:35

## 2022-01-01 RX ADMIN — FINASTERIDE 5 MILLIGRAM(S): 5 TABLET, FILM COATED ORAL at 11:50

## 2022-01-01 RX ADMIN — PANTOPRAZOLE SODIUM 40 MILLIGRAM(S): 20 TABLET, DELAYED RELEASE ORAL at 05:44

## 2022-01-01 RX ADMIN — PIPERACILLIN AND TAZOBACTAM 25 GRAM(S): 4; .5 INJECTION, POWDER, LYOPHILIZED, FOR SOLUTION INTRAVENOUS at 22:56

## 2022-01-01 RX ADMIN — Medication 3 MILLILITER(S): at 11:45

## 2022-01-01 RX ADMIN — TRAMADOL HYDROCHLORIDE 50 MILLIGRAM(S): 50 TABLET ORAL at 23:11

## 2022-01-01 RX ADMIN — TAMSULOSIN HYDROCHLORIDE 0.8 MILLIGRAM(S): 0.4 CAPSULE ORAL at 21:00

## 2022-01-01 RX ADMIN — HYDROMORPHONE HYDROCHLORIDE 0.5 MILLIGRAM(S): 2 INJECTION INTRAMUSCULAR; INTRAVENOUS; SUBCUTANEOUS at 15:30

## 2022-01-01 RX ADMIN — Medication 0.5 MILLIGRAM(S): at 17:14

## 2022-01-01 RX ADMIN — Medication 20 MICROGRAM(S): at 22:12

## 2022-01-01 RX ADMIN — HEPARIN SODIUM 18 UNIT(S)/HR: 5000 INJECTION INTRAVENOUS; SUBCUTANEOUS at 22:33

## 2022-01-01 RX ADMIN — INSULIN HUMAN 3 UNIT(S)/HR: 100 INJECTION, SOLUTION SUBCUTANEOUS at 19:36

## 2022-01-01 RX ADMIN — Medication 0.5 MILLIGRAM(S): at 17:11

## 2022-01-01 RX ADMIN — Medication 4: at 13:13

## 2022-01-01 RX ADMIN — Medication 1 APPLICATION(S): at 17:33

## 2022-01-01 RX ADMIN — GABAPENTIN 300 MILLIGRAM(S): 400 CAPSULE ORAL at 06:16

## 2022-01-01 RX ADMIN — GABAPENTIN 100 MILLIGRAM(S): 400 CAPSULE ORAL at 06:24

## 2022-01-01 RX ADMIN — FINASTERIDE 5 MILLIGRAM(S): 5 TABLET, FILM COATED ORAL at 14:11

## 2022-01-01 RX ADMIN — GABAPENTIN 100 MILLIGRAM(S): 400 CAPSULE ORAL at 22:15

## 2022-01-01 RX ADMIN — Medication 1 APPLICATION(S): at 12:55

## 2022-01-01 RX ADMIN — OXYCODONE HYDROCHLORIDE 5 MILLIGRAM(S): 5 TABLET ORAL at 21:00

## 2022-01-01 RX ADMIN — Medication 40 MILLIEQUIVALENT(S): at 01:24

## 2022-01-01 RX ADMIN — Medication 20 MILLIGRAM(S): at 17:49

## 2022-01-01 RX ADMIN — Medication 3 MILLILITER(S): at 06:06

## 2022-01-01 RX ADMIN — PANTOPRAZOLE SODIUM 40 MILLIGRAM(S): 20 TABLET, DELAYED RELEASE ORAL at 06:34

## 2022-01-01 RX ADMIN — GABAPENTIN 600 MILLIGRAM(S): 400 CAPSULE ORAL at 12:07

## 2022-01-01 RX ADMIN — Medication 25 MICROGRAM(S): at 05:22

## 2022-01-01 RX ADMIN — FINASTERIDE 5 MILLIGRAM(S): 5 TABLET, FILM COATED ORAL at 11:02

## 2022-01-01 RX ADMIN — CEFTRIAXONE 100 MILLIGRAM(S): 500 INJECTION, POWDER, FOR SOLUTION INTRAMUSCULAR; INTRAVENOUS at 10:31

## 2022-01-01 RX ADMIN — Medication 975 MILLIGRAM(S): at 03:16

## 2022-01-01 RX ADMIN — Medication 1 TABLET(S): at 17:50

## 2022-01-01 RX ADMIN — Medication 0.5 MILLIGRAM(S): at 06:12

## 2022-01-01 RX ADMIN — HEPARIN SODIUM 18 UNIT(S)/HR: 5000 INJECTION INTRAVENOUS; SUBCUTANEOUS at 15:52

## 2022-01-01 RX ADMIN — Medication 25 MILLIGRAM(S): at 06:01

## 2022-01-01 RX ADMIN — Medication 3 MILLILITER(S): at 05:48

## 2022-01-01 RX ADMIN — Medication 20 MICROGRAM(S): at 23:32

## 2022-01-01 RX ADMIN — Medication 3 MILLILITER(S): at 22:12

## 2022-01-01 RX ADMIN — Medication 40 MILLIGRAM(S): at 06:53

## 2022-01-01 RX ADMIN — Medication 25 MICROGRAM(S): at 05:10

## 2022-01-01 RX ADMIN — Medication 1 TABLET(S): at 12:50

## 2022-01-01 RX ADMIN — Medication 4: at 18:50

## 2022-01-01 RX ADMIN — Medication 20 MILLIGRAM(S): at 00:17

## 2022-01-01 RX ADMIN — TRAMADOL HYDROCHLORIDE 50 MILLIGRAM(S): 50 TABLET ORAL at 21:42

## 2022-01-01 RX ADMIN — Medication 3 MILLILITER(S): at 08:32

## 2022-01-01 RX ADMIN — Medication 4: at 13:22

## 2022-01-01 RX ADMIN — SENNA PLUS 2 TABLET(S): 8.6 TABLET ORAL at 21:43

## 2022-01-01 RX ADMIN — Medication 1 APPLICATION(S): at 17:43

## 2022-01-01 RX ADMIN — Medication 1200 MILLIGRAM(S): at 18:08

## 2022-01-01 RX ADMIN — Medication 650 MILLIGRAM(S): at 22:30

## 2022-01-01 RX ADMIN — Medication 1200 MILLIGRAM(S): at 05:38

## 2022-01-01 RX ADMIN — POLYETHYLENE GLYCOL 3350 17 GRAM(S): 17 POWDER, FOR SOLUTION ORAL at 12:20

## 2022-01-01 RX ADMIN — Medication 1200 MILLIGRAM(S): at 18:21

## 2022-01-01 RX ADMIN — Medication 1 TABLET(S): at 11:04

## 2022-01-01 RX ADMIN — HEPARIN SODIUM 14 UNIT(S)/HR: 5000 INJECTION INTRAVENOUS; SUBCUTANEOUS at 22:51

## 2022-01-01 RX ADMIN — Medication 975 MILLIGRAM(S): at 13:50

## 2022-01-01 RX ADMIN — SENNA PLUS 2 TABLET(S): 8.6 TABLET ORAL at 21:37

## 2022-01-01 RX ADMIN — Medication 1200 MILLIGRAM(S): at 17:08

## 2022-01-01 RX ADMIN — GABAPENTIN 100 MILLIGRAM(S): 400 CAPSULE ORAL at 18:25

## 2022-01-01 RX ADMIN — Medication 2 UNIT(S): at 18:07

## 2022-01-01 RX ADMIN — INSULIN GLARGINE 23 UNIT(S): 100 INJECTION, SOLUTION SUBCUTANEOUS at 21:35

## 2022-01-01 RX ADMIN — MONTELUKAST 10 MILLIGRAM(S): 4 TABLET, CHEWABLE ORAL at 11:24

## 2022-01-01 RX ADMIN — INSULIN GLARGINE 15 UNIT(S): 100 INJECTION, SOLUTION SUBCUTANEOUS at 21:41

## 2022-01-01 RX ADMIN — Medication 20 MILLIGRAM(S): at 12:58

## 2022-01-01 RX ADMIN — Medication 650 MILLIGRAM(S): at 02:15

## 2022-01-01 RX ADMIN — Medication 0.5 MILLIGRAM(S): at 06:53

## 2022-01-01 RX ADMIN — Medication 100 GRAM(S): at 12:39

## 2022-01-01 RX ADMIN — Medication 10 MILLILITER(S): at 00:38

## 2022-01-01 RX ADMIN — Medication 20 MILLIGRAM(S): at 05:10

## 2022-01-01 RX ADMIN — ATORVASTATIN CALCIUM 80 MILLIGRAM(S): 80 TABLET, FILM COATED ORAL at 21:07

## 2022-01-01 RX ADMIN — OXYCODONE HYDROCHLORIDE 10 MILLIGRAM(S): 5 TABLET ORAL at 14:30

## 2022-01-01 RX ADMIN — Medication 1 APPLICATION(S): at 12:20

## 2022-01-01 RX ADMIN — Medication 3 MILLILITER(S): at 06:51

## 2022-01-01 RX ADMIN — Medication 100 MILLIGRAM(S): at 06:25

## 2022-01-01 RX ADMIN — TAMSULOSIN HYDROCHLORIDE 0.8 MILLIGRAM(S): 0.4 CAPSULE ORAL at 21:19

## 2022-01-01 RX ADMIN — POLYETHYLENE GLYCOL 3350 17 GRAM(S): 17 POWDER, FOR SOLUTION ORAL at 12:30

## 2022-01-01 RX ADMIN — ATORVASTATIN CALCIUM 40 MILLIGRAM(S): 80 TABLET, FILM COATED ORAL at 21:04

## 2022-01-01 RX ADMIN — Medication 0.5 MILLIGRAM(S): at 09:00

## 2022-01-01 RX ADMIN — MONTELUKAST 10 MILLIGRAM(S): 4 TABLET, CHEWABLE ORAL at 12:13

## 2022-01-01 RX ADMIN — Medication 975 MILLIGRAM(S): at 12:26

## 2022-01-01 RX ADMIN — HEPARIN SODIUM 14 UNIT(S)/HR: 5000 INJECTION INTRAVENOUS; SUBCUTANEOUS at 19:12

## 2022-01-01 RX ADMIN — Medication 81 MILLIGRAM(S): at 12:50

## 2022-01-01 RX ADMIN — Medication 3 MILLILITER(S): at 13:10

## 2022-01-01 RX ADMIN — Medication 100 MILLIGRAM(S): at 18:27

## 2022-01-01 RX ADMIN — Medication 975 MILLIGRAM(S): at 23:53

## 2022-01-01 RX ADMIN — Medication 975 MILLIGRAM(S): at 12:10

## 2022-01-01 RX ADMIN — HEPARIN SODIUM 15 UNIT(S)/HR: 5000 INJECTION INTRAVENOUS; SUBCUTANEOUS at 19:45

## 2022-01-01 RX ADMIN — PANTOPRAZOLE SODIUM 40 MILLIGRAM(S): 20 TABLET, DELAYED RELEASE ORAL at 05:39

## 2022-01-01 RX ADMIN — HYDROMORPHONE HYDROCHLORIDE 0.5 MILLIGRAM(S): 2 INJECTION INTRAMUSCULAR; INTRAVENOUS; SUBCUTANEOUS at 10:51

## 2022-01-01 RX ADMIN — MORPHINE SULFATE 2 MILLIGRAM(S): 50 CAPSULE, EXTENDED RELEASE ORAL at 08:50

## 2022-01-01 RX ADMIN — Medication 3 MILLILITER(S): at 15:36

## 2022-01-01 RX ADMIN — Medication 15 UNIT(S): at 13:22

## 2022-01-01 RX ADMIN — Medication 40 MILLIGRAM(S): at 05:03

## 2022-01-01 RX ADMIN — Medication 1 PACKET(S): at 09:28

## 2022-01-01 RX ADMIN — OXYCODONE HYDROCHLORIDE 5 MILLIGRAM(S): 5 TABLET ORAL at 22:19

## 2022-01-01 RX ADMIN — Medication 400 MILLIGRAM(S): at 16:43

## 2022-01-01 RX ADMIN — Medication 40 MILLIGRAM(S): at 01:24

## 2022-01-01 RX ADMIN — GABAPENTIN 100 MILLIGRAM(S): 400 CAPSULE ORAL at 06:06

## 2022-01-01 RX ADMIN — Medication 20 MICROGRAM(S): at 22:14

## 2022-01-01 RX ADMIN — Medication 975 MILLIGRAM(S): at 17:16

## 2022-01-01 RX ADMIN — SODIUM CHLORIDE 100 MILLILITER(S): 9 INJECTION, SOLUTION INTRAVENOUS at 17:29

## 2022-01-01 RX ADMIN — MONTELUKAST 10 MILLIGRAM(S): 4 TABLET, CHEWABLE ORAL at 12:00

## 2022-01-01 RX ADMIN — Medication 20 MILLIGRAM(S): at 15:03

## 2022-01-01 RX ADMIN — Medication 1000 MILLIGRAM(S): at 17:03

## 2022-01-01 RX ADMIN — OXYCODONE HYDROCHLORIDE 2.5 MILLIGRAM(S): 5 TABLET ORAL at 03:03

## 2022-01-01 RX ADMIN — INSULIN HUMAN 3 UNIT(S): 100 INJECTION, SOLUTION SUBCUTANEOUS at 20:17

## 2022-01-01 RX ADMIN — FINASTERIDE 5 MILLIGRAM(S): 5 TABLET, FILM COATED ORAL at 18:12

## 2022-01-01 RX ADMIN — POLYETHYLENE GLYCOL 3350 17 GRAM(S): 17 POWDER, FOR SOLUTION ORAL at 11:26

## 2022-01-01 RX ADMIN — FINASTERIDE 5 MILLIGRAM(S): 5 TABLET, FILM COATED ORAL at 10:33

## 2022-01-01 RX ADMIN — Medication 25 MILLIGRAM(S): at 05:07

## 2022-01-01 RX ADMIN — Medication 650 MILLIGRAM(S): at 13:43

## 2022-01-01 RX ADMIN — HEPARIN SODIUM 15.5 UNIT(S)/HR: 5000 INJECTION INTRAVENOUS; SUBCUTANEOUS at 07:25

## 2022-01-01 RX ADMIN — Medication 50 MILLIGRAM(S): at 05:42

## 2022-01-01 RX ADMIN — FINASTERIDE 5 MILLIGRAM(S): 5 TABLET, FILM COATED ORAL at 12:27

## 2022-01-01 RX ADMIN — SACUBITRIL AND VALSARTAN 1 TABLET(S): 24; 26 TABLET, FILM COATED ORAL at 18:57

## 2022-01-01 RX ADMIN — Medication 1200 MILLIGRAM(S): at 18:05

## 2022-01-01 RX ADMIN — Medication 3 UNIT(S): at 13:13

## 2022-01-01 RX ADMIN — Medication 100 MILLIGRAM(S): at 05:33

## 2022-01-01 RX ADMIN — Medication 2 UNIT(S): at 13:41

## 2022-01-01 RX ADMIN — MORPHINE SULFATE 2 MILLIGRAM(S): 50 CAPSULE, EXTENDED RELEASE ORAL at 00:00

## 2022-01-01 RX ADMIN — ALBUTEROL 2 PUFF(S): 90 AEROSOL, METERED ORAL at 09:18

## 2022-01-01 RX ADMIN — OXYCODONE HYDROCHLORIDE 2.5 MILLIGRAM(S): 5 TABLET ORAL at 01:13

## 2022-01-01 RX ADMIN — Medication 3 MILLILITER(S): at 21:30

## 2022-01-01 RX ADMIN — HEPARIN SODIUM 14.5 UNIT(S)/HR: 5000 INJECTION INTRAVENOUS; SUBCUTANEOUS at 16:32

## 2022-01-01 RX ADMIN — INSULIN HUMAN 3 UNIT(S)/HR: 100 INJECTION, SOLUTION SUBCUTANEOUS at 01:15

## 2022-01-01 RX ADMIN — POLYETHYLENE GLYCOL 3350 17 GRAM(S): 17 POWDER, FOR SOLUTION ORAL at 12:51

## 2022-01-01 RX ADMIN — HYDROMORPHONE HYDROCHLORIDE 0.5 MILLIGRAM(S): 2 INJECTION INTRAMUSCULAR; INTRAVENOUS; SUBCUTANEOUS at 06:43

## 2022-01-01 RX ADMIN — Medication 50 MILLIGRAM(S): at 05:03

## 2022-01-01 RX ADMIN — OXYCODONE HYDROCHLORIDE 5 MILLIGRAM(S): 5 TABLET ORAL at 03:17

## 2022-01-01 RX ADMIN — Medication 975 MILLIGRAM(S): at 01:15

## 2022-01-01 RX ADMIN — Medication 20 MILLIGRAM(S): at 05:22

## 2022-01-01 RX ADMIN — PIPERACILLIN AND TAZOBACTAM 25 GRAM(S): 4; .5 INJECTION, POWDER, LYOPHILIZED, FOR SOLUTION INTRAVENOUS at 13:04

## 2022-01-01 RX ADMIN — Medication 10 MILLILITER(S): at 06:34

## 2022-01-01 RX ADMIN — Medication 20 UNIT(S): at 13:46

## 2022-01-01 RX ADMIN — SENNA PLUS 2 TABLET(S): 8.6 TABLET ORAL at 22:09

## 2022-01-01 RX ADMIN — GABAPENTIN 100 MILLIGRAM(S): 400 CAPSULE ORAL at 05:04

## 2022-01-01 RX ADMIN — MONTELUKAST 10 MILLIGRAM(S): 4 TABLET, CHEWABLE ORAL at 12:11

## 2022-01-01 RX ADMIN — Medication 100 MILLIGRAM(S): at 05:52

## 2022-01-01 RX ADMIN — Medication 80 MILLIGRAM(S): at 17:51

## 2022-01-01 RX ADMIN — Medication 4 MILLILITER(S): at 06:54

## 2022-01-01 RX ADMIN — Medication 650 MILLIGRAM(S): at 18:34

## 2022-01-01 RX ADMIN — Medication 20 MILLIGRAM(S): at 18:41

## 2022-01-01 RX ADMIN — Medication 0.5 MILLIGRAM(S): at 21:48

## 2022-01-01 RX ADMIN — Medication 1 APPLICATION(S): at 12:52

## 2022-01-01 RX ADMIN — Medication 975 MILLIGRAM(S): at 06:22

## 2022-01-01 RX ADMIN — POLYETHYLENE GLYCOL 3350 17 GRAM(S): 17 POWDER, FOR SOLUTION ORAL at 05:09

## 2022-01-01 RX ADMIN — Medication 50 MILLIGRAM(S): at 05:49

## 2022-01-01 RX ADMIN — OXYCODONE HYDROCHLORIDE 5 MILLIGRAM(S): 5 TABLET ORAL at 14:10

## 2022-01-01 RX ADMIN — OXYCODONE HYDROCHLORIDE 5 MILLIGRAM(S): 5 TABLET ORAL at 01:00

## 2022-01-01 RX ADMIN — HEPARIN SODIUM 11 UNIT(S)/HR: 5000 INJECTION INTRAVENOUS; SUBCUTANEOUS at 17:28

## 2022-01-01 RX ADMIN — Medication 3 MILLILITER(S): at 06:24

## 2022-01-01 RX ADMIN — PIPERACILLIN AND TAZOBACTAM 25 GRAM(S): 4; .5 INJECTION, POWDER, LYOPHILIZED, FOR SOLUTION INTRAVENOUS at 06:22

## 2022-01-01 RX ADMIN — Medication 975 MILLIGRAM(S): at 22:50

## 2022-01-01 RX ADMIN — Medication 4 MILLILITER(S): at 06:00

## 2022-01-01 RX ADMIN — Medication 81 MILLIGRAM(S): at 13:23

## 2022-01-01 RX ADMIN — INSULIN GLARGINE 20 UNIT(S): 100 INJECTION, SOLUTION SUBCUTANEOUS at 22:19

## 2022-01-01 RX ADMIN — Medication 6: at 14:44

## 2022-01-01 RX ADMIN — Medication 2: at 17:43

## 2022-01-01 RX ADMIN — Medication 20 UNIT(S): at 18:19

## 2022-01-01 RX ADMIN — Medication 975 MILLIGRAM(S): at 22:48

## 2022-01-01 RX ADMIN — Medication 3 MILLILITER(S): at 03:50

## 2022-01-01 RX ADMIN — HEPARIN SODIUM 15.5 UNIT(S)/HR: 5000 INJECTION INTRAVENOUS; SUBCUTANEOUS at 19:37

## 2022-01-01 RX ADMIN — Medication 3 MILLILITER(S): at 01:00

## 2022-01-01 RX ADMIN — Medication 20 MILLIGRAM(S): at 00:04

## 2022-01-01 RX ADMIN — Medication 81 MILLIGRAM(S): at 12:32

## 2022-01-01 RX ADMIN — MONTELUKAST 10 MILLIGRAM(S): 4 TABLET, CHEWABLE ORAL at 12:31

## 2022-01-01 RX ADMIN — HEPARIN SODIUM 18 UNIT(S)/HR: 5000 INJECTION INTRAVENOUS; SUBCUTANEOUS at 08:17

## 2022-01-01 RX ADMIN — PIPERACILLIN AND TAZOBACTAM 25 GRAM(S): 4; .5 INJECTION, POWDER, LYOPHILIZED, FOR SOLUTION INTRAVENOUS at 22:17

## 2022-01-01 RX ADMIN — Medication 50 MILLIGRAM(S): at 18:06

## 2022-01-01 RX ADMIN — FINASTERIDE 5 MILLIGRAM(S): 5 TABLET, FILM COATED ORAL at 13:56

## 2022-01-01 RX ADMIN — SENNA PLUS 2 TABLET(S): 8.6 TABLET ORAL at 21:00

## 2022-01-01 RX ADMIN — INSULIN GLARGINE 7 UNIT(S): 100 INJECTION, SOLUTION SUBCUTANEOUS at 21:46

## 2022-01-01 RX ADMIN — SENNA PLUS 2 TABLET(S): 8.6 TABLET ORAL at 21:26

## 2022-01-01 RX ADMIN — Medication 3 MILLILITER(S): at 13:28

## 2022-01-01 RX ADMIN — Medication 7 UNIT(S): at 18:05

## 2022-01-01 RX ADMIN — HEPARIN SODIUM 9 UNIT(S)/HR: 5000 INJECTION INTRAVENOUS; SUBCUTANEOUS at 10:33

## 2022-01-01 RX ADMIN — HEPARIN SODIUM 15 UNIT(S)/HR: 5000 INJECTION INTRAVENOUS; SUBCUTANEOUS at 07:59

## 2022-01-01 RX ADMIN — Medication 650 MILLIGRAM(S): at 19:31

## 2022-01-01 RX ADMIN — Medication 25 MILLIGRAM(S): at 05:25

## 2022-01-01 RX ADMIN — Medication 25 MICROGRAM(S): at 04:38

## 2022-01-01 RX ADMIN — ATORVASTATIN CALCIUM 40 MILLIGRAM(S): 80 TABLET, FILM COATED ORAL at 21:44

## 2022-01-01 RX ADMIN — Medication 25 MICROGRAM(S): at 06:43

## 2022-01-01 RX ADMIN — GABAPENTIN 300 MILLIGRAM(S): 400 CAPSULE ORAL at 06:10

## 2022-01-01 RX ADMIN — HEPARIN SODIUM 16 UNIT(S)/HR: 5000 INJECTION INTRAVENOUS; SUBCUTANEOUS at 20:16

## 2022-01-01 RX ADMIN — HYDROMORPHONE HYDROCHLORIDE 0.5 MILLIGRAM(S): 2 INJECTION INTRAMUSCULAR; INTRAVENOUS; SUBCUTANEOUS at 06:05

## 2022-01-01 RX ADMIN — CHLORHEXIDINE GLUCONATE 1 APPLICATION(S): 213 SOLUTION TOPICAL at 08:29

## 2022-01-01 RX ADMIN — ATORVASTATIN CALCIUM 80 MILLIGRAM(S): 80 TABLET, FILM COATED ORAL at 22:17

## 2022-01-01 RX ADMIN — Medication 975 MILLIGRAM(S): at 05:25

## 2022-01-01 RX ADMIN — Medication 3 MILLILITER(S): at 22:20

## 2022-01-01 RX ADMIN — ROBINUL 1 MILLIGRAM(S): 0.2 INJECTION INTRAMUSCULAR; INTRAVENOUS at 09:04

## 2022-01-01 RX ADMIN — MONTELUKAST 10 MILLIGRAM(S): 4 TABLET, CHEWABLE ORAL at 13:14

## 2022-01-01 RX ADMIN — POLYETHYLENE GLYCOL 3350 17 GRAM(S): 17 POWDER, FOR SOLUTION ORAL at 05:32

## 2022-01-01 RX ADMIN — Medication 20 MILLIGRAM(S): at 05:50

## 2022-01-01 RX ADMIN — Medication 10 MILLILITER(S): at 18:02

## 2022-01-01 RX ADMIN — HEPARIN SODIUM 10 UNIT(S)/HR: 5000 INJECTION INTRAVENOUS; SUBCUTANEOUS at 02:46

## 2022-01-01 RX ADMIN — Medication 1200 MILLIGRAM(S): at 17:15

## 2022-01-01 RX ADMIN — SODIUM CHLORIDE 60 MILLILITER(S): 9 INJECTION, SOLUTION INTRAVENOUS at 10:03

## 2022-01-01 RX ADMIN — ATORVASTATIN CALCIUM 40 MILLIGRAM(S): 80 TABLET, FILM COATED ORAL at 21:53

## 2022-01-01 RX ADMIN — Medication 2: at 17:25

## 2022-01-01 RX ADMIN — PIPERACILLIN AND TAZOBACTAM 25 GRAM(S): 4; .5 INJECTION, POWDER, LYOPHILIZED, FOR SOLUTION INTRAVENOUS at 14:02

## 2022-01-01 RX ADMIN — Medication 0.5 MILLIGRAM(S): at 06:33

## 2022-01-01 RX ADMIN — MONTELUKAST 10 MILLIGRAM(S): 4 TABLET, CHEWABLE ORAL at 11:18

## 2022-01-01 RX ADMIN — Medication 1: at 12:54

## 2022-01-01 RX ADMIN — LOSARTAN POTASSIUM 100 MILLIGRAM(S): 100 TABLET, FILM COATED ORAL at 06:23

## 2022-01-01 RX ADMIN — Medication 3 MILLILITER(S): at 13:14

## 2022-01-01 RX ADMIN — Medication 1200 MILLIGRAM(S): at 18:30

## 2022-01-01 RX ADMIN — FINASTERIDE 5 MILLIGRAM(S): 5 TABLET, FILM COATED ORAL at 12:13

## 2022-01-01 RX ADMIN — Medication 40 MILLIGRAM(S): at 17:37

## 2022-01-01 RX ADMIN — Medication 5 UNIT(S): at 18:09

## 2022-01-01 RX ADMIN — Medication 3 MILLILITER(S): at 23:37

## 2022-01-01 RX ADMIN — ONDANSETRON 4 MILLIGRAM(S): 8 TABLET, FILM COATED ORAL at 15:59

## 2022-01-01 RX ADMIN — Medication 3: at 09:24

## 2022-01-01 RX ADMIN — GABAPENTIN 300 MILLIGRAM(S): 400 CAPSULE ORAL at 14:43

## 2022-01-01 RX ADMIN — TAMSULOSIN HYDROCHLORIDE 0.8 MILLIGRAM(S): 0.4 CAPSULE ORAL at 22:17

## 2022-01-01 RX ADMIN — OXYCODONE HYDROCHLORIDE 5 MILLIGRAM(S): 5 TABLET ORAL at 17:04

## 2022-01-01 RX ADMIN — PIPERACILLIN AND TAZOBACTAM 25 GRAM(S): 4; .5 INJECTION, POWDER, LYOPHILIZED, FOR SOLUTION INTRAVENOUS at 05:07

## 2022-01-01 RX ADMIN — Medication 650 MILLIGRAM(S): at 23:32

## 2022-01-01 RX ADMIN — ENOXAPARIN SODIUM 90 MILLIGRAM(S): 100 INJECTION SUBCUTANEOUS at 18:13

## 2022-01-01 RX ADMIN — FINASTERIDE 5 MILLIGRAM(S): 5 TABLET, FILM COATED ORAL at 12:07

## 2022-01-01 RX ADMIN — Medication 5 UNIT(S): at 18:46

## 2022-01-01 RX ADMIN — TAMSULOSIN HYDROCHLORIDE 0.8 MILLIGRAM(S): 0.4 CAPSULE ORAL at 22:15

## 2022-01-01 RX ADMIN — PANTOPRAZOLE SODIUM 40 MILLIGRAM(S): 20 TABLET, DELAYED RELEASE ORAL at 05:15

## 2022-01-01 RX ADMIN — Medication 20 MILLIGRAM(S): at 05:17

## 2022-01-01 RX ADMIN — ATORVASTATIN CALCIUM 80 MILLIGRAM(S): 80 TABLET, FILM COATED ORAL at 22:57

## 2022-01-01 RX ADMIN — Medication 4: at 11:55

## 2022-01-01 RX ADMIN — Medication 40 MILLIGRAM(S): at 17:21

## 2022-01-01 RX ADMIN — Medication 40 MILLIEQUIVALENT(S): at 10:21

## 2022-01-01 RX ADMIN — INSULIN HUMAN 3 UNIT(S): 100 INJECTION, SOLUTION SUBCUTANEOUS at 01:14

## 2022-01-01 RX ADMIN — PIPERACILLIN AND TAZOBACTAM 25 GRAM(S): 4; .5 INJECTION, POWDER, LYOPHILIZED, FOR SOLUTION INTRAVENOUS at 22:16

## 2022-01-01 RX ADMIN — TAMSULOSIN HYDROCHLORIDE 0.8 MILLIGRAM(S): 0.4 CAPSULE ORAL at 22:57

## 2022-01-01 RX ADMIN — Medication 1200 MILLIGRAM(S): at 05:11

## 2022-01-01 RX ADMIN — Medication 975 MILLIGRAM(S): at 18:14

## 2022-01-01 RX ADMIN — Medication 20 MILLIGRAM(S): at 05:37

## 2022-01-01 RX ADMIN — Medication 2: at 22:07

## 2022-01-01 RX ADMIN — Medication 2: at 16:07

## 2022-01-01 RX ADMIN — Medication 100 MILLIEQUIVALENT(S): at 05:12

## 2022-01-01 RX ADMIN — Medication 100 MILLIGRAM(S): at 06:53

## 2022-01-01 RX ADMIN — PIPERACILLIN AND TAZOBACTAM 25 GRAM(S): 4; .5 INJECTION, POWDER, LYOPHILIZED, FOR SOLUTION INTRAVENOUS at 21:41

## 2022-01-01 RX ADMIN — Medication 40 MILLIEQUIVALENT(S): at 09:28

## 2022-01-01 RX ADMIN — Medication 650 MILLIGRAM(S): at 12:10

## 2022-01-01 RX ADMIN — Medication 3 MILLILITER(S): at 05:25

## 2022-01-01 RX ADMIN — Medication 50 MILLIGRAM(S): at 06:52

## 2022-01-01 RX ADMIN — INSULIN GLARGINE 26 UNIT(S): 100 INJECTION, SOLUTION SUBCUTANEOUS at 22:31

## 2022-01-01 RX ADMIN — ATORVASTATIN CALCIUM 80 MILLIGRAM(S): 80 TABLET, FILM COATED ORAL at 22:53

## 2022-01-01 RX ADMIN — ATORVASTATIN CALCIUM 40 MILLIGRAM(S): 80 TABLET, FILM COATED ORAL at 22:15

## 2022-01-01 RX ADMIN — Medication 3 MILLILITER(S): at 05:07

## 2022-01-01 RX ADMIN — Medication 4 MILLILITER(S): at 17:51

## 2022-01-01 RX ADMIN — FINASTERIDE 5 MILLIGRAM(S): 5 TABLET, FILM COATED ORAL at 11:24

## 2022-01-01 RX ADMIN — Medication 25 GRAM(S): at 19:35

## 2022-01-01 RX ADMIN — INSULIN GLARGINE 7 UNIT(S): 100 INJECTION, SOLUTION SUBCUTANEOUS at 22:28

## 2022-01-01 RX ADMIN — Medication 15 UNIT(S): at 09:10

## 2022-01-01 RX ADMIN — Medication 3 MILLILITER(S): at 17:29

## 2022-01-01 RX ADMIN — Medication 20 MICROGRAM(S): at 22:10

## 2022-01-01 RX ADMIN — OXYCODONE HYDROCHLORIDE 5 MILLIGRAM(S): 5 TABLET ORAL at 11:33

## 2022-01-01 RX ADMIN — TAMSULOSIN HYDROCHLORIDE 0.4 MILLIGRAM(S): 0.4 CAPSULE ORAL at 21:38

## 2022-01-01 RX ADMIN — Medication 3 MILLILITER(S): at 12:07

## 2022-01-01 RX ADMIN — SODIUM CHLORIDE 75 MILLILITER(S): 9 INJECTION, SOLUTION INTRAVENOUS at 23:13

## 2022-01-01 RX ADMIN — Medication 975 MILLIGRAM(S): at 18:00

## 2022-01-01 RX ADMIN — Medication 2: at 10:32

## 2022-01-01 RX ADMIN — Medication 0.5 MILLIGRAM(S): at 05:26

## 2022-01-01 RX ADMIN — FINASTERIDE 5 MILLIGRAM(S): 5 TABLET, FILM COATED ORAL at 17:51

## 2022-01-01 RX ADMIN — Medication 3 MILLILITER(S): at 12:55

## 2022-01-01 RX ADMIN — Medication 30 MILLIGRAM(S): at 05:28

## 2022-01-01 RX ADMIN — Medication 81 MILLIGRAM(S): at 12:28

## 2022-01-01 RX ADMIN — PANTOPRAZOLE SODIUM 40 MILLIGRAM(S): 20 TABLET, DELAYED RELEASE ORAL at 18:15

## 2022-01-01 RX ADMIN — Medication 975 MILLIGRAM(S): at 23:30

## 2022-01-01 RX ADMIN — Medication 1200 MILLIGRAM(S): at 05:16

## 2022-01-01 RX ADMIN — Medication 2: at 21:47

## 2022-01-01 RX ADMIN — Medication 1200 MILLIGRAM(S): at 06:02

## 2022-01-01 RX ADMIN — TRAMADOL HYDROCHLORIDE 25 MILLIGRAM(S): 50 TABLET ORAL at 22:08

## 2022-01-01 RX ADMIN — Medication 40 MILLIGRAM(S): at 23:34

## 2022-01-01 RX ADMIN — Medication 1200 MILLIGRAM(S): at 17:13

## 2022-01-01 RX ADMIN — Medication 20 MILLIGRAM(S): at 21:00

## 2022-01-01 RX ADMIN — Medication 2: at 13:09

## 2022-01-01 RX ADMIN — Medication 40 MILLIGRAM(S): at 13:23

## 2022-01-01 RX ADMIN — OXYCODONE HYDROCHLORIDE 5 MILLIGRAM(S): 5 TABLET ORAL at 14:17

## 2022-01-01 RX ADMIN — TRAMADOL HYDROCHLORIDE 50 MILLIGRAM(S): 50 TABLET ORAL at 10:05

## 2022-01-01 RX ADMIN — MONTELUKAST 10 MILLIGRAM(S): 4 TABLET, CHEWABLE ORAL at 12:27

## 2022-01-01 RX ADMIN — Medication 3 MILLILITER(S): at 05:20

## 2022-01-01 RX ADMIN — OXYCODONE HYDROCHLORIDE 5 MILLIGRAM(S): 5 TABLET ORAL at 19:37

## 2022-01-01 RX ADMIN — MONTELUKAST 10 MILLIGRAM(S): 4 TABLET, CHEWABLE ORAL at 12:06

## 2022-01-01 RX ADMIN — ROBINUL 1 MILLIGRAM(S): 0.2 INJECTION INTRAMUSCULAR; INTRAVENOUS at 05:22

## 2022-01-01 RX ADMIN — HEPARIN SODIUM 14 UNIT(S)/HR: 5000 INJECTION INTRAVENOUS; SUBCUTANEOUS at 19:59

## 2022-01-01 RX ADMIN — Medication 975 MILLIGRAM(S): at 14:03

## 2022-01-01 RX ADMIN — OXYCODONE HYDROCHLORIDE 5 MILLIGRAM(S): 5 TABLET ORAL at 14:41

## 2022-01-01 RX ADMIN — HEPARIN SODIUM 12 UNIT(S)/HR: 5000 INJECTION INTRAVENOUS; SUBCUTANEOUS at 18:37

## 2022-01-01 RX ADMIN — Medication 8: at 13:46

## 2022-01-01 RX ADMIN — Medication 40 MILLIEQUIVALENT(S): at 13:45

## 2022-01-01 RX ADMIN — Medication 1: at 23:34

## 2022-01-01 RX ADMIN — HEPARIN SODIUM 14 UNIT(S)/HR: 5000 INJECTION INTRAVENOUS; SUBCUTANEOUS at 07:33

## 2022-01-01 RX ADMIN — Medication 20 MILLIGRAM(S): at 17:16

## 2022-01-01 RX ADMIN — Medication 25 MICROGRAM(S): at 05:15

## 2022-01-01 RX ADMIN — Medication 3 MILLILITER(S): at 20:35

## 2022-01-01 RX ADMIN — Medication 1: at 22:49

## 2022-01-01 RX ADMIN — FINASTERIDE 5 MILLIGRAM(S): 5 TABLET, FILM COATED ORAL at 11:03

## 2022-01-01 RX ADMIN — Medication 40 MILLIGRAM(S): at 05:16

## 2022-01-01 RX ADMIN — SACUBITRIL AND VALSARTAN 1 TABLET(S): 24; 26 TABLET, FILM COATED ORAL at 18:27

## 2022-01-01 RX ADMIN — Medication 1200 MILLIGRAM(S): at 05:22

## 2022-01-01 RX ADMIN — Medication 1200 MILLIGRAM(S): at 18:16

## 2022-01-01 RX ADMIN — Medication 1: at 13:13

## 2022-01-01 RX ADMIN — Medication 50 MILLIGRAM(S): at 05:15

## 2022-01-01 RX ADMIN — Medication 25 MICROGRAM(S): at 05:29

## 2022-01-01 RX ADMIN — Medication 162 MILLIGRAM(S): at 16:13

## 2022-01-01 RX ADMIN — MONTELUKAST 10 MILLIGRAM(S): 4 TABLET, CHEWABLE ORAL at 12:07

## 2022-01-01 RX ADMIN — Medication 3 MILLILITER(S): at 11:41

## 2022-01-01 RX ADMIN — Medication 50 MILLIGRAM(S): at 18:07

## 2022-01-01 RX ADMIN — ROBINUL 0.1 MILLIGRAM(S): 0.2 INJECTION INTRAMUSCULAR; INTRAVENOUS at 05:55

## 2022-01-01 RX ADMIN — Medication 975 MILLIGRAM(S): at 21:35

## 2022-01-01 RX ADMIN — Medication 4: at 18:08

## 2022-01-01 RX ADMIN — HEPARIN SODIUM 18 UNIT(S)/HR: 5000 INJECTION INTRAVENOUS; SUBCUTANEOUS at 10:13

## 2022-01-01 RX ADMIN — ALBUTEROL 2 PUFF(S): 90 AEROSOL, METERED ORAL at 22:11

## 2022-01-01 RX ADMIN — Medication 1200 MILLIGRAM(S): at 07:35

## 2022-01-01 RX ADMIN — Medication 1 TABLET(S): at 14:10

## 2022-01-01 RX ADMIN — INSULIN GLARGINE 7 UNIT(S): 100 INJECTION, SOLUTION SUBCUTANEOUS at 21:59

## 2022-01-01 RX ADMIN — TAMSULOSIN HYDROCHLORIDE 0.8 MILLIGRAM(S): 0.4 CAPSULE ORAL at 21:16

## 2022-01-01 RX ADMIN — Medication 81 MILLIGRAM(S): at 12:05

## 2022-01-01 RX ADMIN — PIPERACILLIN AND TAZOBACTAM 25 GRAM(S): 4; .5 INJECTION, POWDER, LYOPHILIZED, FOR SOLUTION INTRAVENOUS at 05:34

## 2022-01-01 RX ADMIN — SENNA PLUS 2 TABLET(S): 8.6 TABLET ORAL at 21:17

## 2022-01-01 RX ADMIN — HEPARIN SODIUM 15.5 UNIT(S)/HR: 5000 INJECTION INTRAVENOUS; SUBCUTANEOUS at 08:34

## 2022-01-01 RX ADMIN — INSULIN GLARGINE 10 UNIT(S): 100 INJECTION, SOLUTION SUBCUTANEOUS at 22:26

## 2022-01-01 RX ADMIN — HYDROMORPHONE HYDROCHLORIDE 0.5 MILLIGRAM(S): 2 INJECTION INTRAMUSCULAR; INTRAVENOUS; SUBCUTANEOUS at 12:08

## 2022-01-01 RX ADMIN — OXYCODONE HYDROCHLORIDE 5 MILLIGRAM(S): 5 TABLET ORAL at 01:30

## 2022-01-01 RX ADMIN — PANTOPRAZOLE SODIUM 40 MILLIGRAM(S): 20 TABLET, DELAYED RELEASE ORAL at 05:35

## 2022-01-01 RX ADMIN — Medication 20 MILLIGRAM(S): at 17:45

## 2022-01-01 RX ADMIN — Medication 975 MILLIGRAM(S): at 17:30

## 2022-01-01 RX ADMIN — WARFARIN SODIUM 7.5 MILLIGRAM(S): 2.5 TABLET ORAL at 21:44

## 2022-01-01 RX ADMIN — Medication 3 MILLILITER(S): at 23:18

## 2022-01-01 RX ADMIN — MORPHINE SULFATE 2 MILLIGRAM(S): 50 CAPSULE, EXTENDED RELEASE ORAL at 08:35

## 2022-01-01 RX ADMIN — INSULIN GLARGINE 10 UNIT(S): 100 INJECTION, SOLUTION SUBCUTANEOUS at 22:09

## 2022-01-01 RX ADMIN — PANTOPRAZOLE SODIUM 40 MILLIGRAM(S): 20 TABLET, DELAYED RELEASE ORAL at 06:45

## 2022-01-01 RX ADMIN — Medication 25 GRAM(S): at 06:20

## 2022-01-01 RX ADMIN — Medication 0.5 MILLIGRAM(S): at 05:05

## 2022-01-01 RX ADMIN — POLYETHYLENE GLYCOL 3350 17 GRAM(S): 17 POWDER, FOR SOLUTION ORAL at 11:33

## 2022-01-01 RX ADMIN — Medication 40 MILLIGRAM(S): at 18:01

## 2022-01-01 RX ADMIN — Medication 10 MILLIGRAM(S): at 06:43

## 2022-01-01 RX ADMIN — SENNA PLUS 2 TABLET(S): 8.6 TABLET ORAL at 22:47

## 2022-01-01 RX ADMIN — Medication 975 MILLIGRAM(S): at 15:08

## 2022-01-01 RX ADMIN — TRAMADOL HYDROCHLORIDE 25 MILLIGRAM(S): 50 TABLET ORAL at 02:25

## 2022-01-01 RX ADMIN — Medication 100 MILLIEQUIVALENT(S): at 05:10

## 2022-01-01 RX ADMIN — ENOXAPARIN SODIUM 90 MILLIGRAM(S): 100 INJECTION SUBCUTANEOUS at 17:51

## 2022-01-01 RX ADMIN — MONTELUKAST 10 MILLIGRAM(S): 4 TABLET, CHEWABLE ORAL at 13:53

## 2022-01-01 RX ADMIN — Medication 975 MILLIGRAM(S): at 19:05

## 2022-01-01 RX ADMIN — Medication 0.5 MILLIGRAM(S): at 20:35

## 2022-01-01 RX ADMIN — TRAMADOL HYDROCHLORIDE 50 MILLIGRAM(S): 50 TABLET ORAL at 20:03

## 2022-01-01 RX ADMIN — Medication 3 MILLILITER(S): at 05:53

## 2022-01-01 RX ADMIN — SODIUM CHLORIDE 4 MILLILITER(S): 9 INJECTION INTRAMUSCULAR; INTRAVENOUS; SUBCUTANEOUS at 09:11

## 2022-01-01 RX ADMIN — Medication 975 MILLIGRAM(S): at 06:48

## 2022-01-01 RX ADMIN — Medication 81 MILLIGRAM(S): at 12:20

## 2022-01-01 RX ADMIN — HEPARIN SODIUM 14.5 UNIT(S)/HR: 5000 INJECTION INTRAVENOUS; SUBCUTANEOUS at 19:29

## 2022-01-01 RX ADMIN — Medication 0.5 MILLIGRAM(S): at 11:17

## 2022-01-01 RX ADMIN — FINASTERIDE 5 MILLIGRAM(S): 5 TABLET, FILM COATED ORAL at 12:25

## 2022-01-01 RX ADMIN — Medication 40 MILLIGRAM(S): at 18:05

## 2022-01-01 RX ADMIN — Medication 975 MILLIGRAM(S): at 13:45

## 2022-01-01 RX ADMIN — Medication 3 MILLILITER(S): at 13:47

## 2022-01-01 RX ADMIN — ATORVASTATIN CALCIUM 80 MILLIGRAM(S): 80 TABLET, FILM COATED ORAL at 21:39

## 2022-01-01 RX ADMIN — Medication 50 MILLIGRAM(S): at 18:59

## 2022-01-01 RX ADMIN — TRAMADOL HYDROCHLORIDE 50 MILLIGRAM(S): 50 TABLET ORAL at 11:00

## 2022-01-01 RX ADMIN — Medication 81 MILLIGRAM(S): at 12:49

## 2022-01-01 RX ADMIN — Medication 975 MILLIGRAM(S): at 17:06

## 2022-01-01 RX ADMIN — Medication 25 MILLIGRAM(S): at 06:23

## 2022-01-01 RX ADMIN — Medication 20 MILLIGRAM(S): at 17:29

## 2022-01-01 RX ADMIN — Medication 975 MILLIGRAM(S): at 10:10

## 2022-01-01 RX ADMIN — GABAPENTIN 300 MILLIGRAM(S): 400 CAPSULE ORAL at 05:14

## 2022-01-01 RX ADMIN — Medication 100 MILLIEQUIVALENT(S): at 10:43

## 2022-01-01 RX ADMIN — Medication 4: at 17:22

## 2022-01-01 RX ADMIN — MONTELUKAST 10 MILLIGRAM(S): 4 TABLET, CHEWABLE ORAL at 12:19

## 2022-01-01 RX ADMIN — Medication 4: at 13:04

## 2022-01-01 RX ADMIN — Medication 4 MILLILITER(S): at 22:27

## 2022-01-01 RX ADMIN — ATORVASTATIN CALCIUM 80 MILLIGRAM(S): 80 TABLET, FILM COATED ORAL at 22:08

## 2022-01-01 RX ADMIN — Medication 650 MILLIGRAM(S): at 23:37

## 2022-01-01 RX ADMIN — Medication 100 MILLIGRAM(S): at 00:11

## 2022-01-01 RX ADMIN — HEPARIN SODIUM 16 UNIT(S)/HR: 5000 INJECTION INTRAVENOUS; SUBCUTANEOUS at 19:37

## 2022-01-01 RX ADMIN — Medication 7 UNIT(S): at 09:09

## 2022-01-01 RX ADMIN — ATORVASTATIN CALCIUM 80 MILLIGRAM(S): 80 TABLET, FILM COATED ORAL at 22:50

## 2022-01-01 RX ADMIN — Medication 975 MILLIGRAM(S): at 22:16

## 2022-01-01 RX ADMIN — Medication 3 MILLILITER(S): at 23:53

## 2022-01-01 RX ADMIN — HYDROMORPHONE HYDROCHLORIDE 0.5 MILLIGRAM(S): 2 INJECTION INTRAMUSCULAR; INTRAVENOUS; SUBCUTANEOUS at 05:50

## 2022-01-01 RX ADMIN — Medication 50 MILLIGRAM(S): at 06:53

## 2022-01-01 RX ADMIN — Medication 100 MILLIEQUIVALENT(S): at 06:43

## 2022-01-01 RX ADMIN — GABAPENTIN 300 MILLIGRAM(S): 400 CAPSULE ORAL at 21:08

## 2022-01-01 RX ADMIN — MONTELUKAST 10 MILLIGRAM(S): 4 TABLET, CHEWABLE ORAL at 13:12

## 2022-01-01 RX ADMIN — Medication 25 MICROGRAM(S): at 06:14

## 2022-01-01 RX ADMIN — Medication 975 MILLIGRAM(S): at 23:27

## 2022-01-01 RX ADMIN — SODIUM CHLORIDE 4 MILLILITER(S): 9 INJECTION INTRAMUSCULAR; INTRAVENOUS; SUBCUTANEOUS at 06:17

## 2022-01-01 RX ADMIN — SENNA PLUS 2 TABLET(S): 8.6 TABLET ORAL at 21:15

## 2022-01-01 RX ADMIN — Medication 650 MILLIGRAM(S): at 19:00

## 2022-01-01 RX ADMIN — TRAMADOL HYDROCHLORIDE 50 MILLIGRAM(S): 50 TABLET ORAL at 19:28

## 2022-01-01 RX ADMIN — GABAPENTIN 300 MILLIGRAM(S): 400 CAPSULE ORAL at 05:07

## 2022-01-01 RX ADMIN — Medication 975 MILLIGRAM(S): at 12:15

## 2022-01-01 RX ADMIN — Medication 81 MILLIGRAM(S): at 13:12

## 2022-01-01 RX ADMIN — PANTOPRAZOLE SODIUM 40 MILLIGRAM(S): 20 TABLET, DELAYED RELEASE ORAL at 08:37

## 2022-01-01 RX ADMIN — INSULIN GLARGINE 10 UNIT(S): 100 INJECTION, SOLUTION SUBCUTANEOUS at 23:57

## 2022-01-01 RX ADMIN — Medication 20 MILLIGRAM(S): at 10:31

## 2022-01-01 RX ADMIN — MONTELUKAST 10 MILLIGRAM(S): 4 TABLET, CHEWABLE ORAL at 12:35

## 2022-01-01 RX ADMIN — Medication 5: at 09:17

## 2022-01-01 RX ADMIN — Medication 3 MILLILITER(S): at 18:37

## 2022-01-01 RX ADMIN — Medication 81 MILLIGRAM(S): at 12:34

## 2022-01-01 RX ADMIN — Medication 125 MILLIGRAM(S): at 21:28

## 2022-01-01 RX ADMIN — Medication 1 TABLET(S): at 13:22

## 2022-01-01 RX ADMIN — Medication 10 MILLIGRAM(S): at 05:40

## 2022-01-01 RX ADMIN — PIPERACILLIN AND TAZOBACTAM 25 GRAM(S): 4; .5 INJECTION, POWDER, LYOPHILIZED, FOR SOLUTION INTRAVENOUS at 21:19

## 2022-01-01 RX ADMIN — OXYCODONE HYDROCHLORIDE 5 MILLIGRAM(S): 5 TABLET ORAL at 20:36

## 2022-01-01 RX ADMIN — Medication 25 GRAM(S): at 09:31

## 2022-01-01 RX ADMIN — FINASTERIDE 5 MILLIGRAM(S): 5 TABLET, FILM COATED ORAL at 14:30

## 2022-01-01 RX ADMIN — TRAMADOL HYDROCHLORIDE 50 MILLIGRAM(S): 50 TABLET ORAL at 17:10

## 2022-01-01 RX ADMIN — Medication 10 MILLILITER(S): at 12:22

## 2022-01-01 RX ADMIN — HEPARIN SODIUM 19 UNIT(S)/HR: 5000 INJECTION INTRAVENOUS; SUBCUTANEOUS at 16:25

## 2022-01-01 RX ADMIN — OXYCODONE HYDROCHLORIDE 5 MILLIGRAM(S): 5 TABLET ORAL at 19:38

## 2022-01-01 RX ADMIN — FINASTERIDE 5 MILLIGRAM(S): 5 TABLET, FILM COATED ORAL at 18:07

## 2022-01-01 RX ADMIN — MORPHINE SULFATE 2 MILLIGRAM(S): 50 CAPSULE, EXTENDED RELEASE ORAL at 13:33

## 2022-01-01 RX ADMIN — Medication 650 MILLIGRAM(S): at 00:21

## 2022-01-01 RX ADMIN — OXYCODONE HYDROCHLORIDE 5 MILLIGRAM(S): 5 TABLET ORAL at 06:54

## 2022-01-01 RX ADMIN — HYDROMORPHONE HYDROCHLORIDE 0.5 MILLIGRAM(S): 2 INJECTION INTRAMUSCULAR; INTRAVENOUS; SUBCUTANEOUS at 15:01

## 2022-01-01 RX ADMIN — Medication 10 MILLILITER(S): at 17:34

## 2022-01-01 RX ADMIN — HEPARIN SODIUM 16 UNIT(S)/HR: 5000 INJECTION INTRAVENOUS; SUBCUTANEOUS at 01:15

## 2022-01-01 RX ADMIN — FINASTERIDE 5 MILLIGRAM(S): 5 TABLET, FILM COATED ORAL at 11:20

## 2022-01-01 RX ADMIN — FINASTERIDE 5 MILLIGRAM(S): 5 TABLET, FILM COATED ORAL at 12:51

## 2022-01-01 RX ADMIN — OXYCODONE HYDROCHLORIDE 5 MILLIGRAM(S): 5 TABLET ORAL at 06:45

## 2022-01-01 RX ADMIN — HEPARIN SODIUM 16 UNIT(S)/HR: 5000 INJECTION INTRAVENOUS; SUBCUTANEOUS at 21:01

## 2022-01-01 RX ADMIN — TAMSULOSIN HYDROCHLORIDE 0.8 MILLIGRAM(S): 0.4 CAPSULE ORAL at 21:15

## 2022-01-01 RX ADMIN — CEFTRIAXONE 100 MILLIGRAM(S): 500 INJECTION, POWDER, FOR SOLUTION INTRAMUSCULAR; INTRAVENOUS at 13:02

## 2022-01-01 RX ADMIN — ATORVASTATIN CALCIUM 80 MILLIGRAM(S): 80 TABLET, FILM COATED ORAL at 22:33

## 2022-01-01 RX ADMIN — PANTOPRAZOLE SODIUM 40 MILLIGRAM(S): 20 TABLET, DELAYED RELEASE ORAL at 07:16

## 2022-01-01 RX ADMIN — Medication 3 MILLILITER(S): at 17:28

## 2022-01-01 RX ADMIN — Medication 12.5 GRAM(S): at 17:52

## 2022-01-01 RX ADMIN — ROBINUL 0.1 MILLIGRAM(S): 0.2 INJECTION INTRAMUSCULAR; INTRAVENOUS at 05:12

## 2022-01-01 RX ADMIN — HEPARIN SODIUM 18 UNIT(S)/HR: 5000 INJECTION INTRAVENOUS; SUBCUTANEOUS at 15:05

## 2022-01-01 RX ADMIN — Medication 1 TABLET(S): at 12:19

## 2022-01-01 RX ADMIN — ATORVASTATIN CALCIUM 80 MILLIGRAM(S): 80 TABLET, FILM COATED ORAL at 21:43

## 2022-01-01 RX ADMIN — Medication 400 MILLIGRAM(S): at 21:38

## 2022-01-01 RX ADMIN — GABAPENTIN 600 MILLIGRAM(S): 400 CAPSULE ORAL at 15:08

## 2022-01-01 RX ADMIN — Medication 3 MILLILITER(S): at 23:28

## 2022-01-01 RX ADMIN — OXYCODONE HYDROCHLORIDE 5 MILLIGRAM(S): 5 TABLET ORAL at 21:30

## 2022-01-01 RX ADMIN — Medication 1200 MILLIGRAM(S): at 06:22

## 2022-01-01 RX ADMIN — GABAPENTIN 300 MILLIGRAM(S): 400 CAPSULE ORAL at 21:33

## 2022-01-01 RX ADMIN — Medication 1 APPLICATION(S): at 13:16

## 2022-01-01 RX ADMIN — Medication 3 MILLILITER(S): at 15:03

## 2022-01-01 RX ADMIN — FINASTERIDE 5 MILLIGRAM(S): 5 TABLET, FILM COATED ORAL at 11:05

## 2022-01-01 RX ADMIN — GABAPENTIN 300 MILLIGRAM(S): 400 CAPSULE ORAL at 14:16

## 2022-01-01 RX ADMIN — HYDROMORPHONE HYDROCHLORIDE 0.5 MILLIGRAM(S): 2 INJECTION INTRAMUSCULAR; INTRAVENOUS; SUBCUTANEOUS at 14:22

## 2022-01-01 RX ADMIN — Medication 81 MILLIGRAM(S): at 12:23

## 2022-01-01 RX ADMIN — OXYCODONE HYDROCHLORIDE 5 MILLIGRAM(S): 5 TABLET ORAL at 20:07

## 2022-01-01 RX ADMIN — Medication 3 UNIT(S): at 18:43

## 2022-01-01 RX ADMIN — HYDROMORPHONE HYDROCHLORIDE 0.5 MILLIGRAM(S): 2 INJECTION INTRAMUSCULAR; INTRAVENOUS; SUBCUTANEOUS at 06:59

## 2022-01-01 RX ADMIN — Medication 975 MILLIGRAM(S): at 21:43

## 2022-01-01 RX ADMIN — Medication 50 MILLIGRAM(S): at 18:18

## 2022-01-01 RX ADMIN — PIPERACILLIN AND TAZOBACTAM 25 GRAM(S): 4; .5 INJECTION, POWDER, LYOPHILIZED, FOR SOLUTION INTRAVENOUS at 05:50

## 2022-01-01 RX ADMIN — HEPARIN SODIUM 15.5 UNIT(S)/HR: 5000 INJECTION INTRAVENOUS; SUBCUTANEOUS at 07:14

## 2022-01-01 RX ADMIN — Medication 3 MILLILITER(S): at 05:29

## 2022-01-01 RX ADMIN — Medication 975 MILLIGRAM(S): at 12:18

## 2022-01-01 RX ADMIN — Medication 975 MILLIGRAM(S): at 06:34

## 2022-01-01 RX ADMIN — MORPHINE SULFATE 2 MILLIGRAM(S): 50 CAPSULE, EXTENDED RELEASE ORAL at 04:47

## 2022-01-01 RX ADMIN — Medication 10 MILLILITER(S): at 18:17

## 2022-01-01 RX ADMIN — Medication 40 MILLIGRAM(S): at 18:29

## 2022-01-01 RX ADMIN — Medication 81 MILLIGRAM(S): at 12:13

## 2022-01-01 RX ADMIN — GABAPENTIN 300 MILLIGRAM(S): 400 CAPSULE ORAL at 13:47

## 2022-01-01 RX ADMIN — HEPARIN SODIUM 8 UNIT(S)/HR: 5000 INJECTION INTRAVENOUS; SUBCUTANEOUS at 08:00

## 2022-01-01 RX ADMIN — Medication 20 MICROGRAM(S): at 22:29

## 2022-01-01 RX ADMIN — OXYCODONE HYDROCHLORIDE 5 MILLIGRAM(S): 5 TABLET ORAL at 23:00

## 2022-01-01 RX ADMIN — Medication 0.5 MILLIGRAM(S): at 06:00

## 2022-01-01 RX ADMIN — HYDROMORPHONE HYDROCHLORIDE 0.5 MILLIGRAM(S): 2 INJECTION INTRAMUSCULAR; INTRAVENOUS; SUBCUTANEOUS at 19:30

## 2022-01-01 RX ADMIN — Medication 10 MILLIEQUIVALENT(S): at 06:49

## 2022-01-01 RX ADMIN — HYDROMORPHONE HYDROCHLORIDE 0.5 MILLIGRAM(S): 2 INJECTION INTRAMUSCULAR; INTRAVENOUS; SUBCUTANEOUS at 19:45

## 2022-01-01 RX ADMIN — OXYCODONE HYDROCHLORIDE 5 MILLIGRAM(S): 5 TABLET ORAL at 13:49

## 2022-01-01 RX ADMIN — PANTOPRAZOLE SODIUM 40 MILLIGRAM(S): 20 TABLET, DELAYED RELEASE ORAL at 06:16

## 2022-01-01 RX ADMIN — MORPHINE SULFATE 2 MILLIGRAM(S): 50 CAPSULE, EXTENDED RELEASE ORAL at 03:54

## 2022-01-01 RX ADMIN — HEPARIN SODIUM 16 UNIT(S)/HR: 5000 INJECTION INTRAVENOUS; SUBCUTANEOUS at 22:08

## 2022-01-01 RX ADMIN — PANTOPRAZOLE SODIUM 40 MILLIGRAM(S): 20 TABLET, DELAYED RELEASE ORAL at 07:04

## 2022-01-01 RX ADMIN — Medication 80 MILLIGRAM(S): at 05:14

## 2022-01-01 RX ADMIN — Medication 20 MILLIGRAM(S): at 06:56

## 2022-01-01 RX ADMIN — Medication 50 MILLIGRAM(S): at 05:17

## 2022-01-01 RX ADMIN — Medication 1 APPLICATION(S): at 13:06

## 2022-01-01 RX ADMIN — GABAPENTIN 300 MILLIGRAM(S): 400 CAPSULE ORAL at 13:22

## 2022-01-01 RX ADMIN — MONTELUKAST 10 MILLIGRAM(S): 4 TABLET, CHEWABLE ORAL at 12:49

## 2022-01-01 RX ADMIN — ATORVASTATIN CALCIUM 80 MILLIGRAM(S): 80 TABLET, FILM COATED ORAL at 21:33

## 2022-01-01 RX ADMIN — Medication 3 MILLILITER(S): at 00:06

## 2022-01-01 RX ADMIN — Medication 0.5 MILLIGRAM(S): at 18:11

## 2022-01-01 RX ADMIN — Medication 4: at 09:43

## 2022-01-01 RX ADMIN — POLYETHYLENE GLYCOL 3350 17 GRAM(S): 17 POWDER, FOR SOLUTION ORAL at 05:17

## 2022-01-01 RX ADMIN — HEPARIN SODIUM 14.5 UNIT(S)/HR: 5000 INJECTION INTRAVENOUS; SUBCUTANEOUS at 19:33

## 2022-01-01 RX ADMIN — Medication 50 MILLIGRAM(S): at 06:33

## 2022-01-01 RX ADMIN — Medication 20 MILLIGRAM(S): at 17:46

## 2022-01-01 RX ADMIN — Medication 3 MILLILITER(S): at 11:02

## 2022-01-01 RX ADMIN — Medication 1 APPLICATION(S): at 13:45

## 2022-01-01 RX ADMIN — Medication 6 UNIT(S): at 12:31

## 2022-01-01 RX ADMIN — MORPHINE SULFATE 2 MILLIGRAM(S): 50 CAPSULE, EXTENDED RELEASE ORAL at 04:00

## 2022-01-01 RX ADMIN — Medication 3 MILLILITER(S): at 06:12

## 2022-01-01 RX ADMIN — Medication 975 MILLIGRAM(S): at 11:03

## 2022-01-01 RX ADMIN — OXYCODONE HYDROCHLORIDE 5 MILLIGRAM(S): 5 TABLET ORAL at 10:30

## 2022-01-01 RX ADMIN — Medication 40 MILLIEQUIVALENT(S): at 13:10

## 2022-01-01 RX ADMIN — Medication 18 UNIT(S): at 09:05

## 2022-01-01 RX ADMIN — Medication 1: at 22:23

## 2022-01-01 RX ADMIN — Medication 0.5 MILLIGRAM(S): at 18:02

## 2022-01-01 RX ADMIN — Medication 3 MILLILITER(S): at 10:14

## 2022-01-01 RX ADMIN — Medication 975 MILLIGRAM(S): at 21:01

## 2022-01-01 RX ADMIN — OXYCODONE HYDROCHLORIDE 5 MILLIGRAM(S): 5 TABLET ORAL at 21:39

## 2022-01-01 RX ADMIN — Medication 2: at 12:53

## 2022-01-01 RX ADMIN — Medication 0.5 MILLIGRAM(S): at 17:52

## 2022-01-01 RX ADMIN — Medication 3 MILLILITER(S): at 17:05

## 2022-01-01 RX ADMIN — Medication 650 MILLIGRAM(S): at 16:32

## 2022-01-01 RX ADMIN — Medication 0.5 MILLIGRAM(S): at 18:37

## 2022-01-01 RX ADMIN — Medication 20 MILLIGRAM(S): at 13:51

## 2022-01-01 RX ADMIN — Medication 975 MILLIGRAM(S): at 17:34

## 2022-01-01 RX ADMIN — Medication 6 UNIT(S): at 18:15

## 2022-01-01 RX ADMIN — Medication 50 MILLIGRAM(S): at 06:39

## 2022-01-01 RX ADMIN — Medication 40 MILLIEQUIVALENT(S): at 12:50

## 2022-01-01 RX ADMIN — TRAMADOL HYDROCHLORIDE 50 MILLIGRAM(S): 50 TABLET ORAL at 21:35

## 2022-01-01 RX ADMIN — ATORVASTATIN CALCIUM 40 MILLIGRAM(S): 80 TABLET, FILM COATED ORAL at 23:05

## 2022-01-01 RX ADMIN — GABAPENTIN 100 MILLIGRAM(S): 400 CAPSULE ORAL at 05:07

## 2022-01-01 RX ADMIN — Medication 975 MILLIGRAM(S): at 00:21

## 2022-01-01 RX ADMIN — GABAPENTIN 100 MILLIGRAM(S): 400 CAPSULE ORAL at 06:30

## 2022-01-01 RX ADMIN — POLYETHYLENE GLYCOL 3350 17 GRAM(S): 17 POWDER, FOR SOLUTION ORAL at 05:20

## 2022-01-01 RX ADMIN — Medication 3 MILLILITER(S): at 10:52

## 2022-01-01 RX ADMIN — Medication 3 MILLILITER(S): at 23:20

## 2022-01-01 RX ADMIN — ENOXAPARIN SODIUM 90 MILLIGRAM(S): 100 INJECTION SUBCUTANEOUS at 05:14

## 2022-01-01 RX ADMIN — Medication 1 TABLET(S): at 11:24

## 2022-01-01 RX ADMIN — TAMSULOSIN HYDROCHLORIDE 0.8 MILLIGRAM(S): 0.4 CAPSULE ORAL at 21:43

## 2022-01-01 RX ADMIN — ATORVASTATIN CALCIUM 80 MILLIGRAM(S): 80 TABLET, FILM COATED ORAL at 21:40

## 2022-01-01 RX ADMIN — MONTELUKAST 10 MILLIGRAM(S): 4 TABLET, CHEWABLE ORAL at 12:17

## 2022-01-01 RX ADMIN — FINASTERIDE 5 MILLIGRAM(S): 5 TABLET, FILM COATED ORAL at 12:08

## 2022-01-01 RX ADMIN — Medication 250 MILLIGRAM(S): at 05:38

## 2022-01-01 RX ADMIN — GABAPENTIN 100 MILLIGRAM(S): 400 CAPSULE ORAL at 17:52

## 2022-01-01 RX ADMIN — FINASTERIDE 5 MILLIGRAM(S): 5 TABLET, FILM COATED ORAL at 12:11

## 2022-01-01 RX ADMIN — Medication 20 MILLIGRAM(S): at 18:02

## 2022-01-01 RX ADMIN — Medication 650 MILLIGRAM(S): at 12:04

## 2022-01-01 RX ADMIN — OXYCODONE HYDROCHLORIDE 5 MILLIGRAM(S): 5 TABLET ORAL at 06:32

## 2022-01-01 RX ADMIN — Medication 0.5 MILLIGRAM(S): at 17:05

## 2022-01-01 RX ADMIN — MORPHINE SULFATE 2 MILLIGRAM(S): 50 CAPSULE, EXTENDED RELEASE ORAL at 17:00

## 2022-01-01 RX ADMIN — Medication 80 MILLIGRAM(S): at 00:40

## 2022-01-01 RX ADMIN — TAMSULOSIN HYDROCHLORIDE 0.8 MILLIGRAM(S): 0.4 CAPSULE ORAL at 21:04

## 2022-01-01 RX ADMIN — OXYCODONE HYDROCHLORIDE 2.5 MILLIGRAM(S): 5 TABLET ORAL at 02:33

## 2022-01-01 RX ADMIN — Medication 3 MILLILITER(S): at 12:11

## 2022-01-01 RX ADMIN — POLYETHYLENE GLYCOL 3350 17 GRAM(S): 17 POWDER, FOR SOLUTION ORAL at 13:22

## 2022-01-01 RX ADMIN — PIPERACILLIN AND TAZOBACTAM 25 GRAM(S): 4; .5 INJECTION, POWDER, LYOPHILIZED, FOR SOLUTION INTRAVENOUS at 06:09

## 2022-01-01 RX ADMIN — PIPERACILLIN AND TAZOBACTAM 25 GRAM(S): 4; .5 INJECTION, POWDER, LYOPHILIZED, FOR SOLUTION INTRAVENOUS at 22:01

## 2022-01-01 RX ADMIN — HEPARIN SODIUM 14 UNIT(S)/HR: 5000 INJECTION INTRAVENOUS; SUBCUTANEOUS at 07:21

## 2022-01-01 RX ADMIN — POLYETHYLENE GLYCOL 3350 17 GRAM(S): 17 POWDER, FOR SOLUTION ORAL at 12:12

## 2022-01-01 RX ADMIN — Medication 2: at 09:25

## 2022-01-01 RX ADMIN — Medication 975 MILLIGRAM(S): at 00:15

## 2022-01-01 RX ADMIN — Medication 975 MILLIGRAM(S): at 15:30

## 2022-01-01 RX ADMIN — Medication 1: at 13:33

## 2022-01-01 RX ADMIN — MORPHINE SULFATE 2 MILLIGRAM(S): 50 CAPSULE, EXTENDED RELEASE ORAL at 04:15

## 2022-01-01 RX ADMIN — Medication 1 TABLET(S): at 12:26

## 2022-01-01 RX ADMIN — PIPERACILLIN AND TAZOBACTAM 25 GRAM(S): 4; .5 INJECTION, POWDER, LYOPHILIZED, FOR SOLUTION INTRAVENOUS at 14:14

## 2022-01-01 RX ADMIN — POLYETHYLENE GLYCOL 3350 17 GRAM(S): 17 POWDER, FOR SOLUTION ORAL at 18:19

## 2022-01-01 RX ADMIN — Medication 25 MICROGRAM(S): at 05:16

## 2022-01-01 RX ADMIN — Medication 1: at 09:04

## 2022-01-01 RX ADMIN — OXYCODONE HYDROCHLORIDE 5 MILLIGRAM(S): 5 TABLET ORAL at 07:02

## 2022-01-01 RX ADMIN — Medication 975 MILLIGRAM(S): at 06:33

## 2022-01-01 RX ADMIN — Medication 1 APPLICATION(S): at 18:34

## 2022-01-01 RX ADMIN — Medication 400 MILLIGRAM(S): at 22:04

## 2022-01-01 RX ADMIN — Medication 650 MILLIGRAM(S): at 13:24

## 2022-01-01 RX ADMIN — Medication 3: at 12:53

## 2022-01-01 RX ADMIN — Medication 20 MICROGRAM(S): at 22:34

## 2022-01-01 RX ADMIN — AZITHROMYCIN 255 MILLIGRAM(S): 500 TABLET, FILM COATED ORAL at 19:31

## 2022-01-01 RX ADMIN — HEPARIN SODIUM 15.5 UNIT(S)/HR: 5000 INJECTION INTRAVENOUS; SUBCUTANEOUS at 18:58

## 2022-01-01 RX ADMIN — SENNA PLUS 2 TABLET(S): 8.6 TABLET ORAL at 21:02

## 2022-01-01 RX ADMIN — PANTOPRAZOLE SODIUM 40 MILLIGRAM(S): 20 TABLET, DELAYED RELEASE ORAL at 04:15

## 2022-01-01 RX ADMIN — Medication 3 MILLILITER(S): at 12:10

## 2022-01-01 RX ADMIN — OXYCODONE HYDROCHLORIDE 5 MILLIGRAM(S): 5 TABLET ORAL at 04:31

## 2022-01-01 RX ADMIN — Medication 40 MILLIEQUIVALENT(S): at 18:16

## 2022-01-01 RX ADMIN — HEPARIN SODIUM 16 UNIT(S)/HR: 5000 INJECTION INTRAVENOUS; SUBCUTANEOUS at 18:23

## 2022-01-01 RX ADMIN — SENNA PLUS 2 TABLET(S): 8.6 TABLET ORAL at 21:01

## 2022-01-01 RX ADMIN — TAMSULOSIN HYDROCHLORIDE 0.8 MILLIGRAM(S): 0.4 CAPSULE ORAL at 22:14

## 2022-01-01 RX ADMIN — TRAMADOL HYDROCHLORIDE 50 MILLIGRAM(S): 50 TABLET ORAL at 05:00

## 2022-01-01 RX ADMIN — Medication 0.5 MILLIGRAM(S): at 05:07

## 2022-01-01 RX ADMIN — GABAPENTIN 100 MILLIGRAM(S): 400 CAPSULE ORAL at 11:08

## 2022-01-01 RX ADMIN — HEPARIN SODIUM 5000 UNIT(S): 5000 INJECTION INTRAVENOUS; SUBCUTANEOUS at 23:26

## 2022-01-01 RX ADMIN — Medication 6 UNIT(S): at 17:42

## 2022-01-01 RX ADMIN — Medication 20 MILLIGRAM(S): at 13:21

## 2022-01-01 RX ADMIN — Medication 25 MILLIGRAM(S): at 05:37

## 2022-01-01 RX ADMIN — TRAMADOL HYDROCHLORIDE 50 MILLIGRAM(S): 50 TABLET ORAL at 20:00

## 2022-01-01 RX ADMIN — Medication 1 APPLICATION(S): at 11:58

## 2022-01-01 RX ADMIN — OXYCODONE HYDROCHLORIDE 5 MILLIGRAM(S): 5 TABLET ORAL at 21:49

## 2022-01-01 RX ADMIN — TRAMADOL HYDROCHLORIDE 50 MILLIGRAM(S): 50 TABLET ORAL at 04:28

## 2022-01-01 RX ADMIN — POLYETHYLENE GLYCOL 3350 17 GRAM(S): 17 POWDER, FOR SOLUTION ORAL at 11:43

## 2022-01-01 RX ADMIN — OXYCODONE HYDROCHLORIDE 5 MILLIGRAM(S): 5 TABLET ORAL at 16:34

## 2022-01-01 RX ADMIN — Medication 3 MILLILITER(S): at 03:51

## 2022-01-01 RX ADMIN — LOSARTAN POTASSIUM 100 MILLIGRAM(S): 100 TABLET, FILM COATED ORAL at 05:04

## 2022-01-01 RX ADMIN — INSULIN GLARGINE 26 UNIT(S): 100 INJECTION, SOLUTION SUBCUTANEOUS at 22:57

## 2022-01-01 RX ADMIN — Medication 20 MICROGRAM(S): at 21:56

## 2022-01-01 RX ADMIN — Medication 3: at 21:55

## 2022-01-01 RX ADMIN — Medication 3 MILLILITER(S): at 00:20

## 2022-01-01 RX ADMIN — INSULIN HUMAN 3 UNIT(S)/HR: 100 INJECTION, SOLUTION SUBCUTANEOUS at 05:26

## 2022-01-01 RX ADMIN — Medication 250 MILLIGRAM(S): at 23:00

## 2022-01-01 RX ADMIN — Medication 10 UNIT(S): at 18:29

## 2022-01-01 RX ADMIN — ATORVASTATIN CALCIUM 40 MILLIGRAM(S): 80 TABLET, FILM COATED ORAL at 21:49

## 2022-01-01 RX ADMIN — HEPARIN SODIUM 15.5 UNIT(S)/HR: 5000 INJECTION INTRAVENOUS; SUBCUTANEOUS at 09:06

## 2022-01-01 RX ADMIN — Medication 0.5 MILLIGRAM(S): at 09:20

## 2022-01-01 RX ADMIN — WARFARIN SODIUM 7.5 MILLIGRAM(S): 2.5 TABLET ORAL at 22:09

## 2022-01-01 RX ADMIN — Medication 975 MILLIGRAM(S): at 14:58

## 2022-01-01 RX ADMIN — Medication 25 MILLIGRAM(S): at 06:15

## 2022-01-01 RX ADMIN — TRAMADOL HYDROCHLORIDE 25 MILLIGRAM(S): 50 TABLET ORAL at 17:17

## 2022-01-01 RX ADMIN — Medication 650 MILLIGRAM(S): at 06:33

## 2022-01-01 RX ADMIN — Medication 13 UNIT(S): at 18:04

## 2022-01-01 RX ADMIN — ROBINUL 0.1 MILLIGRAM(S): 0.2 INJECTION INTRAMUSCULAR; INTRAVENOUS at 17:46

## 2022-01-01 RX ADMIN — Medication 3 MILLILITER(S): at 23:04

## 2022-01-01 RX ADMIN — Medication 400 MILLIGRAM(S): at 06:38

## 2022-01-01 RX ADMIN — MONTELUKAST 10 MILLIGRAM(S): 4 TABLET, CHEWABLE ORAL at 22:59

## 2022-01-01 RX ADMIN — Medication 650 MILLIGRAM(S): at 13:12

## 2022-01-01 RX ADMIN — GABAPENTIN 100 MILLIGRAM(S): 400 CAPSULE ORAL at 18:04

## 2022-01-01 RX ADMIN — Medication 650 MILLIGRAM(S): at 18:12

## 2022-01-01 RX ADMIN — Medication 3 MILLILITER(S): at 10:31

## 2022-01-01 RX ADMIN — TRAMADOL HYDROCHLORIDE 25 MILLIGRAM(S): 50 TABLET ORAL at 04:16

## 2022-01-01 RX ADMIN — Medication 650 MILLIGRAM(S): at 22:59

## 2022-01-01 RX ADMIN — POLYETHYLENE GLYCOL 3350 17 GRAM(S): 17 POWDER, FOR SOLUTION ORAL at 12:40

## 2022-01-01 RX ADMIN — Medication 2: at 18:47

## 2022-01-01 RX ADMIN — Medication 650 MILLIGRAM(S): at 08:41

## 2022-01-01 RX ADMIN — Medication 650 MILLIGRAM(S): at 18:27

## 2022-01-01 RX ADMIN — Medication 1200 MILLIGRAM(S): at 17:14

## 2022-01-01 RX ADMIN — Medication 20 UNIT(S): at 18:24

## 2022-01-01 RX ADMIN — Medication 975 MILLIGRAM(S): at 05:30

## 2022-01-01 RX ADMIN — HEPARIN SODIUM 10 UNIT(S)/HR: 5000 INJECTION INTRAVENOUS; SUBCUTANEOUS at 10:31

## 2022-01-01 RX ADMIN — Medication 80 MILLIGRAM(S): at 18:12

## 2022-01-01 RX ADMIN — GABAPENTIN 300 MILLIGRAM(S): 400 CAPSULE ORAL at 05:23

## 2022-01-01 RX ADMIN — CHLORHEXIDINE GLUCONATE 1 APPLICATION(S): 213 SOLUTION TOPICAL at 06:10

## 2022-01-01 RX ADMIN — ALBUTEROL 2 PUFF(S): 90 AEROSOL, METERED ORAL at 17:00

## 2022-01-01 RX ADMIN — Medication 0.5 MILLIGRAM(S): at 22:20

## 2022-01-01 RX ADMIN — MONTELUKAST 10 MILLIGRAM(S): 4 TABLET, CHEWABLE ORAL at 11:26

## 2022-01-01 RX ADMIN — PIPERACILLIN AND TAZOBACTAM 25 GRAM(S): 4; .5 INJECTION, POWDER, LYOPHILIZED, FOR SOLUTION INTRAVENOUS at 13:07

## 2022-01-01 RX ADMIN — ENOXAPARIN SODIUM 90 MILLIGRAM(S): 100 INJECTION SUBCUTANEOUS at 05:03

## 2022-01-01 RX ADMIN — POLYETHYLENE GLYCOL 3350 17 GRAM(S): 17 POWDER, FOR SOLUTION ORAL at 17:51

## 2022-01-01 RX ADMIN — SENNA PLUS 2 TABLET(S): 8.6 TABLET ORAL at 20:42

## 2022-01-01 RX ADMIN — Medication 975 MILLIGRAM(S): at 21:37

## 2022-01-01 RX ADMIN — Medication 3 MILLILITER(S): at 17:49

## 2022-01-01 RX ADMIN — POLYETHYLENE GLYCOL 3350 17 GRAM(S): 17 POWDER, FOR SOLUTION ORAL at 06:35

## 2022-01-01 RX ADMIN — Medication 1 PACKET(S): at 11:23

## 2022-01-01 RX ADMIN — INSULIN GLARGINE 7 UNIT(S): 100 INJECTION, SOLUTION SUBCUTANEOUS at 21:40

## 2022-01-01 RX ADMIN — TRAMADOL HYDROCHLORIDE 50 MILLIGRAM(S): 50 TABLET ORAL at 13:18

## 2022-01-01 RX ADMIN — FINASTERIDE 5 MILLIGRAM(S): 5 TABLET, FILM COATED ORAL at 12:28

## 2022-01-01 RX ADMIN — Medication 3 MILLILITER(S): at 10:32

## 2022-01-01 RX ADMIN — Medication 50 MILLIGRAM(S): at 18:28

## 2022-01-01 RX ADMIN — Medication 3 MILLILITER(S): at 05:27

## 2022-01-01 RX ADMIN — Medication 25 GRAM(S): at 01:20

## 2022-01-01 RX ADMIN — GABAPENTIN 300 MILLIGRAM(S): 400 CAPSULE ORAL at 14:57

## 2022-01-01 RX ADMIN — Medication 0.5 MILLIGRAM(S): at 08:32

## 2022-01-01 RX ADMIN — Medication 3 MILLILITER(S): at 13:53

## 2022-01-01 RX ADMIN — Medication 975 MILLIGRAM(S): at 00:16

## 2022-01-01 RX ADMIN — Medication 0.5 MILLIGRAM(S): at 08:02

## 2022-01-01 RX ADMIN — Medication 25 MILLIGRAM(S): at 05:54

## 2022-01-01 RX ADMIN — Medication 50 MILLIGRAM(S): at 17:34

## 2022-01-01 RX ADMIN — Medication 3 MILLILITER(S): at 13:18

## 2022-01-01 RX ADMIN — TRAMADOL HYDROCHLORIDE 50 MILLIGRAM(S): 50 TABLET ORAL at 21:16

## 2022-01-01 RX ADMIN — FINASTERIDE 5 MILLIGRAM(S): 5 TABLET, FILM COATED ORAL at 11:43

## 2022-01-01 RX ADMIN — Medication 0.5 MILLIGRAM(S): at 05:41

## 2022-01-01 RX ADMIN — SODIUM CHLORIDE 75 MILLILITER(S): 9 INJECTION, SOLUTION INTRAVENOUS at 12:59

## 2022-01-01 RX ADMIN — ROBINUL 1 MILLIGRAM(S): 0.2 INJECTION INTRAMUSCULAR; INTRAVENOUS at 17:05

## 2022-01-01 RX ADMIN — Medication 2: at 18:46

## 2022-01-01 RX ADMIN — Medication 0.5 MILLIGRAM(S): at 17:25

## 2022-01-01 RX ADMIN — Medication 100 MILLIEQUIVALENT(S): at 03:43

## 2022-01-01 RX ADMIN — Medication 50 MILLIGRAM(S): at 05:27

## 2022-01-01 RX ADMIN — Medication 20 MILLIGRAM(S): at 06:24

## 2022-01-01 RX ADMIN — GABAPENTIN 100 MILLIGRAM(S): 400 CAPSULE ORAL at 06:53

## 2022-01-01 RX ADMIN — INSULIN GLARGINE 20 UNIT(S): 100 INJECTION, SOLUTION SUBCUTANEOUS at 21:37

## 2022-01-01 RX ADMIN — Medication 6 UNIT(S): at 09:05

## 2022-01-01 RX ADMIN — POTASSIUM PHOSPHATE, MONOBASIC POTASSIUM PHOSPHATE, DIBASIC 62.5 MILLIMOLE(S): 236; 224 INJECTION, SOLUTION INTRAVENOUS at 17:30

## 2022-01-01 RX ADMIN — INSULIN HUMAN 3 UNIT(S)/HR: 100 INJECTION, SOLUTION SUBCUTANEOUS at 12:32

## 2022-01-01 RX ADMIN — TRAMADOL HYDROCHLORIDE 50 MILLIGRAM(S): 50 TABLET ORAL at 23:45

## 2022-01-01 RX ADMIN — FINASTERIDE 5 MILLIGRAM(S): 5 TABLET, FILM COATED ORAL at 14:13

## 2022-01-01 RX ADMIN — Medication 3 MILLILITER(S): at 00:14

## 2022-01-01 RX ADMIN — MONTELUKAST 10 MILLIGRAM(S): 4 TABLET, CHEWABLE ORAL at 11:19

## 2022-01-01 RX ADMIN — TRAMADOL HYDROCHLORIDE 50 MILLIGRAM(S): 50 TABLET ORAL at 13:50

## 2022-01-01 RX ADMIN — HYDROMORPHONE HYDROCHLORIDE 0.5 MILLIGRAM(S): 2 INJECTION INTRAMUSCULAR; INTRAVENOUS; SUBCUTANEOUS at 12:15

## 2022-01-01 RX ADMIN — Medication 975 MILLIGRAM(S): at 11:26

## 2022-01-01 RX ADMIN — Medication 1200 MILLIGRAM(S): at 17:50

## 2022-01-01 RX ADMIN — Medication 4: at 09:26

## 2022-01-01 RX ADMIN — TAMSULOSIN HYDROCHLORIDE 0.4 MILLIGRAM(S): 0.4 CAPSULE ORAL at 21:06

## 2022-01-01 RX ADMIN — TRAMADOL HYDROCHLORIDE 25 MILLIGRAM(S): 50 TABLET ORAL at 12:10

## 2022-01-01 RX ADMIN — FINASTERIDE 5 MILLIGRAM(S): 5 TABLET, FILM COATED ORAL at 11:15

## 2022-01-01 RX ADMIN — Medication 20 MILLIGRAM(S): at 21:01

## 2022-01-01 RX ADMIN — Medication 81 MILLIGRAM(S): at 12:35

## 2022-01-01 RX ADMIN — Medication 2 UNIT(S): at 10:11

## 2022-01-01 RX ADMIN — Medication 8 UNIT(S): at 22:00

## 2022-01-01 RX ADMIN — Medication 3 MILLILITER(S): at 22:19

## 2022-01-01 RX ADMIN — HYDROMORPHONE HYDROCHLORIDE 0.5 MILLIGRAM(S): 2 INJECTION INTRAMUSCULAR; INTRAVENOUS; SUBCUTANEOUS at 07:00

## 2022-01-01 RX ADMIN — ATORVASTATIN CALCIUM 40 MILLIGRAM(S): 80 TABLET, FILM COATED ORAL at 21:37

## 2022-01-01 RX ADMIN — Medication 3 MILLILITER(S): at 12:20

## 2022-01-01 RX ADMIN — Medication 975 MILLIGRAM(S): at 13:03

## 2022-01-01 RX ADMIN — Medication 3 MILLILITER(S): at 01:14

## 2022-01-01 RX ADMIN — TRAMADOL HYDROCHLORIDE 50 MILLIGRAM(S): 50 TABLET ORAL at 09:45

## 2022-01-01 RX ADMIN — GABAPENTIN 100 MILLIGRAM(S): 400 CAPSULE ORAL at 06:01

## 2022-01-01 RX ADMIN — MONTELUKAST 10 MILLIGRAM(S): 4 TABLET, CHEWABLE ORAL at 11:03

## 2022-01-01 RX ADMIN — SENNA PLUS 2 TABLET(S): 8.6 TABLET ORAL at 23:04

## 2022-01-01 RX ADMIN — Medication 50 MILLIGRAM(S): at 17:58

## 2022-01-01 RX ADMIN — Medication 1200 MILLIGRAM(S): at 12:13

## 2022-01-01 RX ADMIN — TRAMADOL HYDROCHLORIDE 50 MILLIGRAM(S): 50 TABLET ORAL at 12:02

## 2022-01-01 RX ADMIN — Medication 975 MILLIGRAM(S): at 06:05

## 2022-01-01 RX ADMIN — TAMSULOSIN HYDROCHLORIDE 0.4 MILLIGRAM(S): 0.4 CAPSULE ORAL at 22:00

## 2022-01-01 RX ADMIN — HYDROMORPHONE HYDROCHLORIDE 0.5 MILLIGRAM(S): 2 INJECTION INTRAMUSCULAR; INTRAVENOUS; SUBCUTANEOUS at 11:38

## 2022-01-01 RX ADMIN — SENNA PLUS 2 TABLET(S): 8.6 TABLET ORAL at 21:39

## 2022-01-01 RX ADMIN — Medication 650 MILLIGRAM(S): at 03:47

## 2022-01-01 RX ADMIN — Medication 3 MILLILITER(S): at 05:28

## 2022-01-01 RX ADMIN — INSULIN GLARGINE 7 UNIT(S): 100 INJECTION, SOLUTION SUBCUTANEOUS at 21:39

## 2022-01-01 RX ADMIN — ENOXAPARIN SODIUM 90 MILLIGRAM(S): 100 INJECTION SUBCUTANEOUS at 05:24

## 2022-01-01 RX ADMIN — Medication 3 MILLILITER(S): at 18:17

## 2022-01-01 RX ADMIN — Medication 1: at 17:41

## 2022-01-01 RX ADMIN — Medication 975 MILLIGRAM(S): at 05:10

## 2022-01-01 RX ADMIN — Medication 40 MILLIGRAM(S): at 17:49

## 2022-01-01 RX ADMIN — Medication 25 MILLIGRAM(S): at 05:30

## 2022-01-01 RX ADMIN — Medication 100 MILLIEQUIVALENT(S): at 01:53

## 2022-01-01 RX ADMIN — Medication 975 MILLIGRAM(S): at 06:01

## 2022-01-01 RX ADMIN — Medication 1 TABLET(S): at 13:45

## 2022-01-01 RX ADMIN — OXYCODONE HYDROCHLORIDE 10 MILLIGRAM(S): 5 TABLET ORAL at 11:28

## 2022-01-01 RX ADMIN — Medication 4: at 18:05

## 2022-01-01 RX ADMIN — Medication 975 MILLIGRAM(S): at 06:16

## 2022-01-01 RX ADMIN — Medication 81 MILLIGRAM(S): at 12:21

## 2022-01-01 RX ADMIN — Medication 975 MILLIGRAM(S): at 18:05

## 2022-01-01 RX ADMIN — ATORVASTATIN CALCIUM 40 MILLIGRAM(S): 80 TABLET, FILM COATED ORAL at 21:33

## 2022-01-01 RX ADMIN — Medication 6 UNIT(S): at 13:14

## 2022-01-01 RX ADMIN — TAMSULOSIN HYDROCHLORIDE 0.8 MILLIGRAM(S): 0.4 CAPSULE ORAL at 20:42

## 2022-01-01 RX ADMIN — Medication 20 MILLIGRAM(S): at 05:23

## 2022-01-01 RX ADMIN — POLYETHYLENE GLYCOL 3350 17 GRAM(S): 17 POWDER, FOR SOLUTION ORAL at 18:01

## 2022-01-01 RX ADMIN — Medication 40 MILLIEQUIVALENT(S): at 09:11

## 2022-01-01 RX ADMIN — INSULIN GLARGINE 10 UNIT(S): 100 INJECTION, SOLUTION SUBCUTANEOUS at 22:24

## 2022-01-01 RX ADMIN — Medication 50 MILLIGRAM(S): at 05:25

## 2022-01-01 RX ADMIN — Medication 40 MILLIGRAM(S): at 17:14

## 2022-01-01 RX ADMIN — Medication 975 MILLIGRAM(S): at 04:26

## 2022-01-01 RX ADMIN — Medication 650 MILLIGRAM(S): at 00:37

## 2022-01-01 RX ADMIN — Medication 40 MILLIEQUIVALENT(S): at 17:54

## 2022-01-01 RX ADMIN — HEPARIN SODIUM 18 UNIT(S)/HR: 5000 INJECTION INTRAVENOUS; SUBCUTANEOUS at 09:16

## 2022-01-01 RX ADMIN — Medication 81 MILLIGRAM(S): at 12:06

## 2022-01-01 RX ADMIN — Medication 650 MILLIGRAM(S): at 08:29

## 2022-01-01 RX ADMIN — Medication 25 MICROGRAM(S): at 05:51

## 2022-01-01 RX ADMIN — Medication 1200 MILLIGRAM(S): at 17:25

## 2022-01-01 RX ADMIN — MONTELUKAST 10 MILLIGRAM(S): 4 TABLET, CHEWABLE ORAL at 11:02

## 2022-01-01 RX ADMIN — POLYETHYLENE GLYCOL 3350 17 GRAM(S): 17 POWDER, FOR SOLUTION ORAL at 05:42

## 2022-01-01 RX ADMIN — Medication 40 MILLIGRAM(S): at 06:30

## 2022-01-01 RX ADMIN — Medication 1 TABLET(S): at 12:54

## 2022-01-01 RX ADMIN — PIPERACILLIN AND TAZOBACTAM 25 GRAM(S): 4; .5 INJECTION, POWDER, LYOPHILIZED, FOR SOLUTION INTRAVENOUS at 14:57

## 2022-01-01 RX ADMIN — Medication 1 TABLET(S): at 11:33

## 2022-01-01 RX ADMIN — Medication 1 TABLET(S): at 11:06

## 2022-01-01 RX ADMIN — Medication 1 TABLET(S): at 13:28

## 2022-01-01 RX ADMIN — Medication 50 MILLIGRAM(S): at 06:11

## 2022-01-01 RX ADMIN — FINASTERIDE 5 MILLIGRAM(S): 5 TABLET, FILM COATED ORAL at 13:23

## 2022-01-01 RX ADMIN — Medication 975 MILLIGRAM(S): at 23:04

## 2022-01-01 RX ADMIN — WARFARIN SODIUM 5 MILLIGRAM(S): 2.5 TABLET ORAL at 23:45

## 2022-01-01 RX ADMIN — PANTOPRAZOLE SODIUM 40 MILLIGRAM(S): 20 TABLET, DELAYED RELEASE ORAL at 09:10

## 2022-01-01 RX ADMIN — Medication 3 MILLILITER(S): at 12:31

## 2022-01-01 RX ADMIN — HEPARIN SODIUM 18 UNIT(S)/HR: 5000 INJECTION INTRAVENOUS; SUBCUTANEOUS at 06:24

## 2022-01-01 RX ADMIN — HEPARIN SODIUM 5000 UNIT(S): 5000 INJECTION INTRAVENOUS; SUBCUTANEOUS at 16:07

## 2022-01-01 RX ADMIN — Medication 975 MILLIGRAM(S): at 01:24

## 2022-01-01 RX ADMIN — OXYCODONE AND ACETAMINOPHEN 2 TABLET(S): 5; 325 TABLET ORAL at 22:18

## 2022-01-01 RX ADMIN — Medication 1200 MILLIGRAM(S): at 06:15

## 2022-01-01 RX ADMIN — SENNA PLUS 2 TABLET(S): 8.6 TABLET ORAL at 21:35

## 2022-01-01 RX ADMIN — Medication 3 MILLILITER(S): at 06:29

## 2022-01-01 RX ADMIN — Medication 15 UNIT(S): at 08:01

## 2022-01-01 RX ADMIN — ALBUTEROL 2 PUFF(S): 90 AEROSOL, METERED ORAL at 22:33

## 2022-01-01 RX ADMIN — OXYCODONE HYDROCHLORIDE 5 MILLIGRAM(S): 5 TABLET ORAL at 06:25

## 2022-01-01 RX ADMIN — Medication 3 MILLILITER(S): at 00:38

## 2022-01-01 RX ADMIN — GABAPENTIN 600 MILLIGRAM(S): 400 CAPSULE ORAL at 06:44

## 2022-01-01 RX ADMIN — SENNA PLUS 2 TABLET(S): 8.6 TABLET ORAL at 22:14

## 2022-01-01 RX ADMIN — Medication 3 MILLILITER(S): at 12:21

## 2022-01-01 RX ADMIN — Medication 10 MILLILITER(S): at 18:24

## 2022-01-01 RX ADMIN — TAMSULOSIN HYDROCHLORIDE 0.4 MILLIGRAM(S): 0.4 CAPSULE ORAL at 21:23

## 2022-01-01 RX ADMIN — TRAMADOL HYDROCHLORIDE 50 MILLIGRAM(S): 50 TABLET ORAL at 07:31

## 2022-01-01 RX ADMIN — GABAPENTIN 300 MILLIGRAM(S): 400 CAPSULE ORAL at 21:25

## 2022-01-01 RX ADMIN — Medication 975 MILLIGRAM(S): at 05:39

## 2022-01-01 RX ADMIN — Medication 1 TABLET(S): at 12:08

## 2022-01-01 RX ADMIN — Medication 1 APPLICATION(S): at 13:00

## 2022-01-01 RX ADMIN — POLYETHYLENE GLYCOL 3350 17 GRAM(S): 17 POWDER, FOR SOLUTION ORAL at 14:15

## 2022-01-01 RX ADMIN — MONTELUKAST 10 MILLIGRAM(S): 4 TABLET, CHEWABLE ORAL at 22:17

## 2022-01-01 RX ADMIN — CEFTRIAXONE 100 MILLIGRAM(S): 500 INJECTION, POWDER, FOR SOLUTION INTRAMUSCULAR; INTRAVENOUS at 09:00

## 2022-01-01 RX ADMIN — Medication 20 MILLIGRAM(S): at 23:18

## 2022-01-01 RX ADMIN — POTASSIUM PHOSPHATE, MONOBASIC POTASSIUM PHOSPHATE, DIBASIC 83.33 MILLIMOLE(S): 236; 224 INJECTION, SOLUTION INTRAVENOUS at 10:32

## 2022-01-01 RX ADMIN — INSULIN GLARGINE 7 UNIT(S): 100 INJECTION, SOLUTION SUBCUTANEOUS at 21:03

## 2022-01-01 RX ADMIN — OXYCODONE HYDROCHLORIDE 5 MILLIGRAM(S): 5 TABLET ORAL at 19:40

## 2022-01-01 RX ADMIN — Medication 0.5 MILLIGRAM(S): at 21:23

## 2022-01-01 RX ADMIN — SACUBITRIL AND VALSARTAN 1 TABLET(S): 24; 26 TABLET, FILM COATED ORAL at 06:53

## 2022-01-01 RX ADMIN — Medication 25 MILLIGRAM(S): at 06:06

## 2022-01-01 RX ADMIN — Medication 650 MILLIGRAM(S): at 18:25

## 2022-01-01 RX ADMIN — INSULIN HUMAN 3 UNIT(S)/HR: 100 INJECTION, SOLUTION SUBCUTANEOUS at 19:38

## 2022-01-01 RX ADMIN — Medication 1 APPLICATION(S): at 18:29

## 2022-01-01 RX ADMIN — MONTELUKAST 10 MILLIGRAM(S): 4 TABLET, CHEWABLE ORAL at 11:50

## 2022-01-01 RX ADMIN — PANTOPRAZOLE SODIUM 40 MILLIGRAM(S): 20 TABLET, DELAYED RELEASE ORAL at 05:04

## 2022-01-01 RX ADMIN — Medication 20 MILLIGRAM(S): at 23:06

## 2022-01-01 RX ADMIN — GABAPENTIN 300 MILLIGRAM(S): 400 CAPSULE ORAL at 21:17

## 2022-01-01 RX ADMIN — Medication 250 MILLIGRAM(S): at 22:11

## 2022-01-01 RX ADMIN — SACUBITRIL AND VALSARTAN 1 TABLET(S): 24; 26 TABLET, FILM COATED ORAL at 06:57

## 2022-01-01 RX ADMIN — Medication 0.5 MILLIGRAM(S): at 18:38

## 2022-01-01 RX ADMIN — Medication 50 MILLIGRAM(S): at 05:22

## 2022-01-01 RX ADMIN — GABAPENTIN 100 MILLIGRAM(S): 400 CAPSULE ORAL at 05:23

## 2022-01-01 RX ADMIN — Medication 10 MILLILITER(S): at 05:15

## 2022-01-01 RX ADMIN — INSULIN GLARGINE 40 UNIT(S): 100 INJECTION, SOLUTION SUBCUTANEOUS at 03:25

## 2022-01-01 RX ADMIN — Medication 3 MILLILITER(S): at 00:28

## 2022-01-01 RX ADMIN — Medication 975 MILLIGRAM(S): at 06:45

## 2022-01-01 RX ADMIN — Medication 3 UNIT(S): at 12:35

## 2022-01-01 RX ADMIN — TRAMADOL HYDROCHLORIDE 50 MILLIGRAM(S): 50 TABLET ORAL at 20:33

## 2022-01-01 RX ADMIN — HEPARIN SODIUM 15.5 UNIT(S)/HR: 5000 INJECTION INTRAVENOUS; SUBCUTANEOUS at 19:40

## 2022-01-01 RX ADMIN — Medication 975 MILLIGRAM(S): at 22:15

## 2022-01-01 RX ADMIN — PIPERACILLIN AND TAZOBACTAM 25 GRAM(S): 4; .5 INJECTION, POWDER, LYOPHILIZED, FOR SOLUTION INTRAVENOUS at 06:19

## 2022-01-01 RX ADMIN — Medication 3 MILLILITER(S): at 21:40

## 2022-01-01 RX ADMIN — Medication 3 MILLILITER(S): at 18:25

## 2022-01-01 RX ADMIN — MONTELUKAST 10 MILLIGRAM(S): 4 TABLET, CHEWABLE ORAL at 11:05

## 2022-01-01 RX ADMIN — Medication 10 MILLILITER(S): at 06:07

## 2022-01-01 RX ADMIN — Medication 975 MILLIGRAM(S): at 05:36

## 2022-01-01 RX ADMIN — SODIUM CHLORIDE 4 MILLILITER(S): 9 INJECTION INTRAMUSCULAR; INTRAVENOUS; SUBCUTANEOUS at 07:04

## 2022-01-01 RX ADMIN — Medication 650 MILLIGRAM(S): at 18:44

## 2022-01-01 RX ADMIN — TAMSULOSIN HYDROCHLORIDE 0.4 MILLIGRAM(S): 0.4 CAPSULE ORAL at 21:40

## 2022-01-01 RX ADMIN — TRAMADOL HYDROCHLORIDE 50 MILLIGRAM(S): 50 TABLET ORAL at 06:26

## 2022-01-01 RX ADMIN — PANTOPRAZOLE SODIUM 40 MILLIGRAM(S): 20 TABLET, DELAYED RELEASE ORAL at 06:25

## 2022-01-01 RX ADMIN — Medication 13 UNIT(S): at 08:20

## 2022-01-01 RX ADMIN — ROBINUL 0.4 MILLIGRAM(S): 0.2 INJECTION INTRAMUSCULAR; INTRAVENOUS at 16:58

## 2022-01-01 RX ADMIN — Medication 25 MICROGRAM(S): at 05:25

## 2022-01-01 RX ADMIN — GABAPENTIN 300 MILLIGRAM(S): 400 CAPSULE ORAL at 21:49

## 2022-01-01 RX ADMIN — Medication 3 MILLILITER(S): at 13:22

## 2022-01-01 RX ADMIN — Medication 1000 MILLIGRAM(S): at 23:49

## 2022-01-01 RX ADMIN — INSULIN GLARGINE 30 UNIT(S): 100 INJECTION, SOLUTION SUBCUTANEOUS at 21:49

## 2022-01-01 RX ADMIN — MONTELUKAST 10 MILLIGRAM(S): 4 TABLET, CHEWABLE ORAL at 10:32

## 2022-01-01 RX ADMIN — INSULIN GLARGINE 30 UNIT(S): 100 INJECTION, SOLUTION SUBCUTANEOUS at 16:09

## 2022-01-01 RX ADMIN — Medication 975 MILLIGRAM(S): at 17:15

## 2022-01-01 RX ADMIN — Medication 0.5 MILLIGRAM(S): at 08:40

## 2022-01-01 RX ADMIN — Medication 1: at 09:53

## 2022-01-01 RX ADMIN — GABAPENTIN 100 MILLIGRAM(S): 400 CAPSULE ORAL at 17:05

## 2022-01-01 RX ADMIN — HEPARIN SODIUM 20 UNIT(S)/HR: 5000 INJECTION INTRAVENOUS; SUBCUTANEOUS at 13:37

## 2022-01-01 RX ADMIN — OXYCODONE HYDROCHLORIDE 5 MILLIGRAM(S): 5 TABLET ORAL at 05:00

## 2022-01-01 RX ADMIN — TAMSULOSIN HYDROCHLORIDE 0.8 MILLIGRAM(S): 0.4 CAPSULE ORAL at 23:33

## 2022-01-01 RX ADMIN — Medication 1200 MILLIGRAM(S): at 05:56

## 2022-01-01 RX ADMIN — TRAMADOL HYDROCHLORIDE 50 MILLIGRAM(S): 50 TABLET ORAL at 01:54

## 2022-01-01 RX ADMIN — Medication 40 MILLIGRAM(S): at 06:52

## 2022-01-01 RX ADMIN — Medication 1: at 12:26

## 2022-01-01 RX ADMIN — Medication 3 MILLILITER(S): at 05:22

## 2022-01-01 RX ADMIN — Medication 20 MILLIGRAM(S): at 06:05

## 2022-01-01 RX ADMIN — PANTOPRAZOLE SODIUM 40 MILLIGRAM(S): 20 TABLET, DELAYED RELEASE ORAL at 05:41

## 2022-01-01 RX ADMIN — Medication 7 UNIT(S): at 13:31

## 2022-01-01 RX ADMIN — PANTOPRAZOLE SODIUM 40 MILLIGRAM(S): 20 TABLET, DELAYED RELEASE ORAL at 05:53

## 2022-01-01 RX ADMIN — GABAPENTIN 600 MILLIGRAM(S): 400 CAPSULE ORAL at 05:28

## 2022-01-01 RX ADMIN — Medication 3 MILLILITER(S): at 20:52

## 2022-01-01 RX ADMIN — Medication 81 MILLIGRAM(S): at 12:12

## 2022-01-01 RX ADMIN — Medication 3 MILLILITER(S): at 06:43

## 2022-01-01 RX ADMIN — ATORVASTATIN CALCIUM 80 MILLIGRAM(S): 80 TABLET, FILM COATED ORAL at 22:48

## 2022-01-01 RX ADMIN — TRAMADOL HYDROCHLORIDE 25 MILLIGRAM(S): 50 TABLET ORAL at 09:18

## 2022-01-01 RX ADMIN — ENOXAPARIN SODIUM 90 MILLIGRAM(S): 100 INJECTION SUBCUTANEOUS at 05:29

## 2022-01-01 RX ADMIN — Medication 10 MILLIGRAM(S): at 02:39

## 2022-01-01 RX ADMIN — HEPARIN SODIUM 18 UNIT(S)/HR: 5000 INJECTION INTRAVENOUS; SUBCUTANEOUS at 01:34

## 2022-01-01 RX ADMIN — TRAMADOL HYDROCHLORIDE 50 MILLIGRAM(S): 50 TABLET ORAL at 04:38

## 2022-01-01 RX ADMIN — LOSARTAN POTASSIUM 100 MILLIGRAM(S): 100 TABLET, FILM COATED ORAL at 05:07

## 2022-01-01 RX ADMIN — PIPERACILLIN AND TAZOBACTAM 25 GRAM(S): 4; .5 INJECTION, POWDER, LYOPHILIZED, FOR SOLUTION INTRAVENOUS at 05:36

## 2022-01-01 RX ADMIN — GABAPENTIN 100 MILLIGRAM(S): 400 CAPSULE ORAL at 17:50

## 2022-01-01 RX ADMIN — Medication 2: at 09:36

## 2022-01-01 RX ADMIN — OXYCODONE HYDROCHLORIDE 5 MILLIGRAM(S): 5 TABLET ORAL at 14:40

## 2022-01-01 RX ADMIN — OXYCODONE HYDROCHLORIDE 5 MILLIGRAM(S): 5 TABLET ORAL at 22:37

## 2022-01-01 RX ADMIN — MONTELUKAST 10 MILLIGRAM(S): 4 TABLET, CHEWABLE ORAL at 22:02

## 2022-01-01 RX ADMIN — Medication 0.5 MILLIGRAM(S): at 05:14

## 2022-01-01 RX ADMIN — Medication 3 MILLILITER(S): at 00:08

## 2022-01-01 RX ADMIN — Medication 0.5 MILLIGRAM(S): at 18:17

## 2022-01-01 RX ADMIN — TAMSULOSIN HYDROCHLORIDE 0.8 MILLIGRAM(S): 0.4 CAPSULE ORAL at 22:59

## 2022-01-01 RX ADMIN — Medication 2: at 13:36

## 2022-01-01 RX ADMIN — Medication 1 TABLET(S): at 12:20

## 2022-01-01 RX ADMIN — PANTOPRAZOLE SODIUM 40 MILLIGRAM(S): 20 TABLET, DELAYED RELEASE ORAL at 05:46

## 2022-01-01 RX ADMIN — PANTOPRAZOLE SODIUM 40 MILLIGRAM(S): 20 TABLET, DELAYED RELEASE ORAL at 06:06

## 2022-01-01 RX ADMIN — Medication 250 MILLIGRAM(S): at 06:16

## 2022-01-01 RX ADMIN — PANTOPRAZOLE SODIUM 40 MILLIGRAM(S): 20 TABLET, DELAYED RELEASE ORAL at 05:07

## 2022-01-01 RX ADMIN — GABAPENTIN 300 MILLIGRAM(S): 400 CAPSULE ORAL at 21:00

## 2022-01-01 RX ADMIN — POLYETHYLENE GLYCOL 3350 17 GRAM(S): 17 POWDER, FOR SOLUTION ORAL at 13:15

## 2022-01-01 RX ADMIN — Medication 25 MICROGRAM(S): at 06:01

## 2022-01-01 RX ADMIN — POLYETHYLENE GLYCOL 3350 17 GRAM(S): 17 POWDER, FOR SOLUTION ORAL at 12:07

## 2022-01-01 RX ADMIN — Medication 50 MILLIGRAM(S): at 18:19

## 2022-01-01 RX ADMIN — Medication 25 MILLIGRAM(S): at 18:18

## 2022-01-01 RX ADMIN — PIPERACILLIN AND TAZOBACTAM 25 GRAM(S): 4; .5 INJECTION, POWDER, LYOPHILIZED, FOR SOLUTION INTRAVENOUS at 22:57

## 2022-01-01 RX ADMIN — Medication 20 MICROGRAM(S): at 01:24

## 2022-01-01 RX ADMIN — HEPARIN SODIUM 16 UNIT(S)/HR: 5000 INJECTION INTRAVENOUS; SUBCUTANEOUS at 19:27

## 2022-01-01 RX ADMIN — Medication 1 APPLICATION(S): at 16:58

## 2022-01-01 RX ADMIN — Medication 20 MILLIGRAM(S): at 18:18

## 2022-01-01 RX ADMIN — Medication 50 MILLIGRAM(S): at 18:26

## 2022-01-01 RX ADMIN — INSULIN GLARGINE 20 UNIT(S): 100 INJECTION, SOLUTION SUBCUTANEOUS at 22:57

## 2022-01-01 RX ADMIN — Medication 3 MILLILITER(S): at 06:57

## 2022-01-01 RX ADMIN — Medication 40 MILLIGRAM(S): at 02:00

## 2022-01-01 RX ADMIN — TRAMADOL HYDROCHLORIDE 25 MILLIGRAM(S): 50 TABLET ORAL at 11:14

## 2022-01-01 RX ADMIN — GABAPENTIN 300 MILLIGRAM(S): 400 CAPSULE ORAL at 21:43

## 2022-01-01 RX ADMIN — Medication 40 MILLIGRAM(S): at 17:13

## 2022-01-01 RX ADMIN — Medication 650 MILLIGRAM(S): at 09:15

## 2022-01-01 RX ADMIN — INSULIN HUMAN 3 UNIT(S): 100 INJECTION, SOLUTION SUBCUTANEOUS at 05:25

## 2022-01-01 RX ADMIN — Medication 20 MILLIEQUIVALENT(S): at 16:32

## 2022-01-01 RX ADMIN — ATORVASTATIN CALCIUM 80 MILLIGRAM(S): 80 TABLET, FILM COATED ORAL at 22:02

## 2022-01-01 RX ADMIN — TIOTROPIUM BROMIDE 1 CAPSULE(S): 18 CAPSULE ORAL; RESPIRATORY (INHALATION) at 09:18

## 2022-01-01 RX ADMIN — CEFTRIAXONE 100 MILLIGRAM(S): 500 INJECTION, POWDER, FOR SOLUTION INTRAMUSCULAR; INTRAVENOUS at 09:08

## 2022-01-01 RX ADMIN — Medication 1 TABLET(S): at 12:31

## 2022-01-01 RX ADMIN — Medication 0.5 MILLIGRAM(S): at 08:58

## 2022-01-01 RX ADMIN — INSULIN GLARGINE 12 UNIT(S): 100 INJECTION, SOLUTION SUBCUTANEOUS at 22:16

## 2022-01-01 RX ADMIN — Medication 5 UNIT(S): at 12:31

## 2022-01-01 RX ADMIN — PIPERACILLIN AND TAZOBACTAM 25 GRAM(S): 4; .5 INJECTION, POWDER, LYOPHILIZED, FOR SOLUTION INTRAVENOUS at 14:36

## 2022-01-01 RX ADMIN — Medication 975 MILLIGRAM(S): at 13:05

## 2022-01-01 RX ADMIN — Medication 25 MICROGRAM(S): at 05:24

## 2022-01-01 RX ADMIN — Medication 3 MILLILITER(S): at 21:16

## 2022-01-01 RX ADMIN — GABAPENTIN 300 MILLIGRAM(S): 400 CAPSULE ORAL at 05:17

## 2022-01-01 RX ADMIN — TAMSULOSIN HYDROCHLORIDE 0.4 MILLIGRAM(S): 0.4 CAPSULE ORAL at 21:35

## 2022-01-01 RX ADMIN — Medication 40 MILLIGRAM(S): at 18:16

## 2022-01-01 RX ADMIN — ATORVASTATIN CALCIUM 40 MILLIGRAM(S): 80 TABLET, FILM COATED ORAL at 21:17

## 2022-01-01 RX ADMIN — Medication 975 MILLIGRAM(S): at 18:30

## 2022-01-01 RX ADMIN — ATORVASTATIN CALCIUM 40 MILLIGRAM(S): 80 TABLET, FILM COATED ORAL at 21:15

## 2022-01-01 RX ADMIN — Medication 0.5 MILLIGRAM(S): at 05:54

## 2022-01-01 RX ADMIN — Medication 0.5 MILLIGRAM(S): at 05:27

## 2022-01-01 RX ADMIN — Medication 3 MILLILITER(S): at 21:48

## 2022-01-01 RX ADMIN — Medication 15 UNIT(S): at 17:24

## 2022-01-01 RX ADMIN — MONTELUKAST 10 MILLIGRAM(S): 4 TABLET, CHEWABLE ORAL at 12:25

## 2022-01-01 RX ADMIN — Medication 0.5 MILLIGRAM(S): at 09:01

## 2022-01-01 RX ADMIN — Medication 20 MILLIGRAM(S): at 06:15

## 2022-01-01 RX ADMIN — Medication 975 MILLIGRAM(S): at 13:20

## 2022-01-01 RX ADMIN — HEPARIN SODIUM 15 UNIT(S)/HR: 5000 INJECTION INTRAVENOUS; SUBCUTANEOUS at 12:06

## 2022-01-01 RX ADMIN — OXYCODONE HYDROCHLORIDE 5 MILLIGRAM(S): 5 TABLET ORAL at 07:39

## 2022-01-01 RX ADMIN — INSULIN GLARGINE 36 UNIT(S): 100 INJECTION, SOLUTION SUBCUTANEOUS at 22:39

## 2022-01-01 RX ADMIN — Medication 81 MILLIGRAM(S): at 13:21

## 2022-01-01 RX ADMIN — Medication 3 MILLILITER(S): at 05:46

## 2022-01-01 RX ADMIN — GABAPENTIN 600 MILLIGRAM(S): 400 CAPSULE ORAL at 12:21

## 2022-01-01 RX ADMIN — MONTELUKAST 10 MILLIGRAM(S): 4 TABLET, CHEWABLE ORAL at 21:39

## 2022-01-01 RX ADMIN — Medication 1 TABLET(S): at 11:25

## 2022-01-01 RX ADMIN — CHLORHEXIDINE GLUCONATE 1 APPLICATION(S): 213 SOLUTION TOPICAL at 06:44

## 2022-01-01 RX ADMIN — Medication 25 GRAM(S): at 11:23

## 2022-01-01 RX ADMIN — HEPARIN SODIUM 15.5 UNIT(S)/HR: 5000 INJECTION INTRAVENOUS; SUBCUTANEOUS at 06:53

## 2022-01-01 RX ADMIN — POLYETHYLENE GLYCOL 3350 17 GRAM(S): 17 POWDER, FOR SOLUTION ORAL at 13:45

## 2022-01-01 RX ADMIN — Medication 1 TABLET(S): at 12:07

## 2022-01-01 RX ADMIN — SACUBITRIL AND VALSARTAN 1 TABLET(S): 24; 26 TABLET, FILM COATED ORAL at 18:25

## 2022-01-01 RX ADMIN — Medication 20 MICROGRAM(S): at 21:38

## 2022-01-01 RX ADMIN — Medication 3 MILLILITER(S): at 05:55

## 2022-01-01 RX ADMIN — Medication 12 UNIT(S): at 12:18

## 2022-01-01 RX ADMIN — Medication 3 MILLILITER(S): at 00:29

## 2022-01-01 RX ADMIN — Medication 50 MILLIGRAM(S): at 05:09

## 2022-01-01 RX ADMIN — Medication 3 MILLILITER(S): at 17:32

## 2022-01-01 RX ADMIN — HEPARIN SODIUM 16 UNIT(S)/HR: 5000 INJECTION INTRAVENOUS; SUBCUTANEOUS at 09:07

## 2022-01-01 RX ADMIN — Medication 975 MILLIGRAM(S): at 21:34

## 2022-01-01 RX ADMIN — ATORVASTATIN CALCIUM 40 MILLIGRAM(S): 80 TABLET, FILM COATED ORAL at 21:43

## 2022-01-01 RX ADMIN — Medication 975 MILLIGRAM(S): at 03:46

## 2022-01-01 RX ADMIN — Medication 166.67 MILLIGRAM(S): at 05:36

## 2022-01-01 RX ADMIN — HEPARIN SODIUM 18 UNIT(S)/HR: 5000 INJECTION INTRAVENOUS; SUBCUTANEOUS at 15:31

## 2022-01-01 RX ADMIN — Medication 80 MILLIGRAM(S): at 17:34

## 2022-01-01 RX ADMIN — Medication 20 MILLIGRAM(S): at 00:38

## 2022-01-01 RX ADMIN — Medication 10 MILLILITER(S): at 06:23

## 2022-01-01 RX ADMIN — Medication 20 MILLIGRAM(S): at 21:17

## 2022-01-01 RX ADMIN — Medication 1 APPLICATION(S): at 12:05

## 2022-01-01 RX ADMIN — Medication 4 UNIT(S): at 11:27

## 2022-01-01 RX ADMIN — INSULIN HUMAN 3 UNIT(S)/HR: 100 INJECTION, SOLUTION SUBCUTANEOUS at 21:01

## 2022-01-01 RX ADMIN — INSULIN GLARGINE 40 UNIT(S): 100 INJECTION, SOLUTION SUBCUTANEOUS at 21:50

## 2022-01-01 RX ADMIN — Medication 7 UNIT(S): at 09:18

## 2022-01-01 RX ADMIN — INSULIN HUMAN 3 UNIT(S)/HR: 100 INJECTION, SOLUTION SUBCUTANEOUS at 09:08

## 2022-01-01 RX ADMIN — OXYCODONE HYDROCHLORIDE 5 MILLIGRAM(S): 5 TABLET ORAL at 20:41

## 2022-01-01 RX ADMIN — GABAPENTIN 300 MILLIGRAM(S): 400 CAPSULE ORAL at 21:36

## 2022-01-01 RX ADMIN — INSULIN GLARGINE 40 UNIT(S): 100 INJECTION, SOLUTION SUBCUTANEOUS at 23:04

## 2022-01-01 RX ADMIN — GABAPENTIN 300 MILLIGRAM(S): 400 CAPSULE ORAL at 06:23

## 2022-01-01 RX ADMIN — Medication 20 MILLIGRAM(S): at 17:59

## 2022-01-01 RX ADMIN — Medication 650 MILLIGRAM(S): at 05:56

## 2022-01-01 RX ADMIN — GABAPENTIN 300 MILLIGRAM(S): 400 CAPSULE ORAL at 06:51

## 2022-01-01 RX ADMIN — CHLORHEXIDINE GLUCONATE 1 APPLICATION(S): 213 SOLUTION TOPICAL at 09:14

## 2022-01-01 RX ADMIN — PIPERACILLIN AND TAZOBACTAM 25 GRAM(S): 4; .5 INJECTION, POWDER, LYOPHILIZED, FOR SOLUTION INTRAVENOUS at 12:24

## 2022-01-01 RX ADMIN — Medication 1: at 14:14

## 2022-01-01 RX ADMIN — Medication 975 MILLIGRAM(S): at 05:15

## 2022-01-01 RX ADMIN — Medication 650 MILLIGRAM(S): at 19:12

## 2022-01-01 RX ADMIN — Medication 20 MILLIGRAM(S): at 17:07

## 2022-01-01 RX ADMIN — POLYETHYLENE GLYCOL 3350 17 GRAM(S): 17 POWDER, FOR SOLUTION ORAL at 11:23

## 2022-01-01 RX ADMIN — SENNA PLUS 2 TABLET(S): 8.6 TABLET ORAL at 22:17

## 2022-01-01 RX ADMIN — Medication 975 MILLIGRAM(S): at 12:19

## 2022-01-01 RX ADMIN — Medication 10 MILLILITER(S): at 17:51

## 2022-01-01 RX ADMIN — Medication 8: at 13:21

## 2022-01-01 RX ADMIN — ENOXAPARIN SODIUM 90 MILLIGRAM(S): 100 INJECTION SUBCUTANEOUS at 05:46

## 2022-01-01 RX ADMIN — Medication 25 MILLIGRAM(S): at 05:14

## 2022-01-01 RX ADMIN — Medication 3 MILLILITER(S): at 17:16

## 2022-01-01 RX ADMIN — Medication 81 MILLIGRAM(S): at 11:20

## 2022-01-01 RX ADMIN — Medication 650 MILLIGRAM(S): at 09:59

## 2022-01-01 RX ADMIN — PANTOPRAZOLE SODIUM 40 MILLIGRAM(S): 20 TABLET, DELAYED RELEASE ORAL at 06:21

## 2022-01-01 RX ADMIN — Medication 2: at 08:19

## 2022-01-01 RX ADMIN — Medication 3 MILLILITER(S): at 22:09

## 2022-01-01 RX ADMIN — FENTANYL CITRATE 25 MICROGRAM(S): 50 INJECTION INTRAVENOUS at 15:56

## 2022-01-01 RX ADMIN — Medication 24 UNIT(S): at 13:22

## 2022-01-01 RX ADMIN — TAMSULOSIN HYDROCHLORIDE 0.4 MILLIGRAM(S): 0.4 CAPSULE ORAL at 21:33

## 2022-01-01 RX ADMIN — HEPARIN SODIUM 14 UNIT(S)/HR: 5000 INJECTION INTRAVENOUS; SUBCUTANEOUS at 09:39

## 2022-01-01 RX ADMIN — ATORVASTATIN CALCIUM 80 MILLIGRAM(S): 80 TABLET, FILM COATED ORAL at 21:41

## 2022-01-01 RX ADMIN — Medication 100 MILLIGRAM(S): at 06:31

## 2022-01-01 RX ADMIN — PANTOPRAZOLE SODIUM 40 MILLIGRAM(S): 20 TABLET, DELAYED RELEASE ORAL at 05:40

## 2022-01-01 RX ADMIN — Medication 1200 MILLIGRAM(S): at 18:41

## 2022-01-01 RX ADMIN — Medication 3: at 13:06

## 2022-01-01 RX ADMIN — OXYCODONE HYDROCHLORIDE 10 MILLIGRAM(S): 5 TABLET ORAL at 05:37

## 2022-01-01 RX ADMIN — ALBUTEROL 2 PUFF(S): 90 AEROSOL, METERED ORAL at 05:23

## 2022-01-01 RX ADMIN — Medication 40 MILLIEQUIVALENT(S): at 17:23

## 2022-01-01 RX ADMIN — PANTOPRAZOLE SODIUM 40 MILLIGRAM(S): 20 TABLET, DELAYED RELEASE ORAL at 05:31

## 2022-01-01 RX ADMIN — Medication 3 MILLILITER(S): at 12:19

## 2022-01-01 RX ADMIN — Medication 20 MILLIGRAM(S): at 16:13

## 2022-01-01 RX ADMIN — MONTELUKAST 10 MILLIGRAM(S): 4 TABLET, CHEWABLE ORAL at 12:23

## 2022-01-01 RX ADMIN — OXYCODONE HYDROCHLORIDE 5 MILLIGRAM(S): 5 TABLET ORAL at 21:11

## 2022-01-01 RX ADMIN — Medication 975 MILLIGRAM(S): at 11:05

## 2022-01-01 RX ADMIN — Medication 5: at 12:16

## 2022-01-01 RX ADMIN — Medication 1 APPLICATION(S): at 12:51

## 2022-01-01 RX ADMIN — Medication 1 APPLICATION(S): at 16:59

## 2022-01-01 RX ADMIN — Medication 6 UNIT(S): at 17:55

## 2022-01-01 RX ADMIN — GABAPENTIN 100 MILLIGRAM(S): 400 CAPSULE ORAL at 18:48

## 2022-01-01 RX ADMIN — PANTOPRAZOLE SODIUM 40 MILLIGRAM(S): 20 TABLET, DELAYED RELEASE ORAL at 05:16

## 2022-01-01 RX ADMIN — HEPARIN SODIUM 16.5 UNIT(S)/HR: 5000 INJECTION INTRAVENOUS; SUBCUTANEOUS at 09:16

## 2022-01-01 RX ADMIN — ATORVASTATIN CALCIUM 40 MILLIGRAM(S): 80 TABLET, FILM COATED ORAL at 21:19

## 2022-01-01 RX ADMIN — Medication 3 MILLILITER(S): at 17:37

## 2022-01-01 RX ADMIN — GABAPENTIN 300 MILLIGRAM(S): 400 CAPSULE ORAL at 13:09

## 2022-01-01 RX ADMIN — Medication 975 MILLIGRAM(S): at 21:44

## 2022-01-01 RX ADMIN — HYDROMORPHONE HYDROCHLORIDE 0.25 MILLIGRAM(S): 2 INJECTION INTRAMUSCULAR; INTRAVENOUS; SUBCUTANEOUS at 13:00

## 2022-01-01 RX ADMIN — TRAMADOL HYDROCHLORIDE 50 MILLIGRAM(S): 50 TABLET ORAL at 18:15

## 2022-01-01 RX ADMIN — PIPERACILLIN AND TAZOBACTAM 25 GRAM(S): 4; .5 INJECTION, POWDER, LYOPHILIZED, FOR SOLUTION INTRAVENOUS at 23:41

## 2022-01-01 RX ADMIN — TRAMADOL HYDROCHLORIDE 25 MILLIGRAM(S): 50 TABLET ORAL at 18:17

## 2022-01-01 RX ADMIN — HEPARIN SODIUM 16 UNIT(S)/HR: 5000 INJECTION INTRAVENOUS; SUBCUTANEOUS at 11:06

## 2022-01-01 RX ADMIN — FINASTERIDE 5 MILLIGRAM(S): 5 TABLET, FILM COATED ORAL at 12:49

## 2022-01-01 RX ADMIN — OXYCODONE HYDROCHLORIDE 5 MILLIGRAM(S): 5 TABLET ORAL at 16:30

## 2022-01-01 RX ADMIN — INSULIN HUMAN 3 UNIT(S)/HR: 100 INJECTION, SOLUTION SUBCUTANEOUS at 11:06

## 2022-01-01 RX ADMIN — SODIUM CHLORIDE 4 MILLILITER(S): 9 INJECTION INTRAMUSCULAR; INTRAVENOUS; SUBCUTANEOUS at 13:28

## 2022-01-01 RX ADMIN — Medication 1200 MILLIGRAM(S): at 17:42

## 2022-01-01 RX ADMIN — Medication 3 MILLILITER(S): at 12:08

## 2022-01-01 RX ADMIN — Medication 0.5 MILLIGRAM(S): at 06:54

## 2022-01-01 RX ADMIN — ATORVASTATIN CALCIUM 40 MILLIGRAM(S): 80 TABLET, FILM COATED ORAL at 20:41

## 2022-01-01 RX ADMIN — Medication 975 MILLIGRAM(S): at 07:18

## 2022-01-01 RX ADMIN — Medication 3 MILLILITER(S): at 09:52

## 2022-01-01 RX ADMIN — Medication 0.5 MILLIGRAM(S): at 05:24

## 2022-01-01 RX ADMIN — Medication 50 MILLIGRAM(S): at 05:52

## 2022-01-01 RX ADMIN — Medication 975 MILLIGRAM(S): at 14:37

## 2022-01-01 RX ADMIN — Medication 1 TABLET(S): at 13:10

## 2022-01-01 RX ADMIN — Medication 50 MILLIGRAM(S): at 17:49

## 2022-01-01 RX ADMIN — Medication 3 MILLILITER(S): at 17:35

## 2022-01-01 RX ADMIN — PANTOPRAZOLE SODIUM 40 MILLIGRAM(S): 20 TABLET, DELAYED RELEASE ORAL at 06:10

## 2022-01-01 RX ADMIN — HEPARIN SODIUM 12 UNIT(S)/HR: 5000 INJECTION INTRAVENOUS; SUBCUTANEOUS at 19:33

## 2022-01-01 RX ADMIN — Medication 10 MILLILITER(S): at 00:28

## 2022-01-01 RX ADMIN — PIPERACILLIN AND TAZOBACTAM 25 GRAM(S): 4; .5 INJECTION, POWDER, LYOPHILIZED, FOR SOLUTION INTRAVENOUS at 05:52

## 2022-01-01 RX ADMIN — Medication 3 MILLILITER(S): at 06:14

## 2022-01-01 RX ADMIN — Medication 4 MILLILITER(S): at 13:47

## 2022-01-01 RX ADMIN — OXYCODONE HYDROCHLORIDE 5 MILLIGRAM(S): 5 TABLET ORAL at 20:45

## 2022-01-01 RX ADMIN — Medication 3 MILLILITER(S): at 04:53

## 2022-01-01 RX ADMIN — Medication 650 MILLIGRAM(S): at 21:40

## 2022-01-01 RX ADMIN — Medication 3 MILLILITER(S): at 14:21

## 2022-01-01 RX ADMIN — Medication 25 MICROGRAM(S): at 06:31

## 2022-01-01 RX ADMIN — Medication 0.5 MILLIGRAM(S): at 21:52

## 2022-01-01 RX ADMIN — Medication 10 MILLILITER(S): at 18:27

## 2022-01-01 RX ADMIN — MORPHINE SULFATE 2 MILLIGRAM(S): 50 CAPSULE, EXTENDED RELEASE ORAL at 13:18

## 2022-01-01 RX ADMIN — Medication 3: at 18:04

## 2022-01-01 RX ADMIN — INSULIN GLARGINE 30 UNIT(S): 100 INJECTION, SOLUTION SUBCUTANEOUS at 21:55

## 2022-01-01 RX ADMIN — WARFARIN SODIUM 5 MILLIGRAM(S): 2.5 TABLET ORAL at 21:40

## 2022-01-01 RX ADMIN — TRAMADOL HYDROCHLORIDE 50 MILLIGRAM(S): 50 TABLET ORAL at 02:26

## 2022-01-01 RX ADMIN — Medication 15 UNIT(S): at 08:00

## 2022-01-01 RX ADMIN — Medication 650 MILLIGRAM(S): at 12:30

## 2022-01-01 RX ADMIN — TAMSULOSIN HYDROCHLORIDE 0.8 MILLIGRAM(S): 0.4 CAPSULE ORAL at 21:25

## 2022-01-01 RX ADMIN — HEPARIN SODIUM 15.5 UNIT(S)/HR: 5000 INJECTION INTRAVENOUS; SUBCUTANEOUS at 21:11

## 2022-01-01 RX ADMIN — IRON SUCROSE 200 MILLIGRAM(S): 20 INJECTION, SOLUTION INTRAVENOUS at 18:31

## 2022-01-01 RX ADMIN — Medication 2: at 09:11

## 2022-01-01 RX ADMIN — POLYETHYLENE GLYCOL 3350 17 GRAM(S): 17 POWDER, FOR SOLUTION ORAL at 18:06

## 2022-01-01 RX ADMIN — Medication 4: at 18:18

## 2022-01-01 RX ADMIN — Medication 25 MICROGRAM(S): at 06:25

## 2022-01-01 RX ADMIN — ENOXAPARIN SODIUM 90 MILLIGRAM(S): 100 INJECTION SUBCUTANEOUS at 06:33

## 2022-01-01 RX ADMIN — GABAPENTIN 300 MILLIGRAM(S): 400 CAPSULE ORAL at 05:39

## 2022-01-01 RX ADMIN — Medication 975 MILLIGRAM(S): at 12:30

## 2022-01-01 RX ADMIN — Medication 81 MILLIGRAM(S): at 12:54

## 2022-01-01 RX ADMIN — Medication 0.5 MILLIGRAM(S): at 17:51

## 2022-01-01 RX ADMIN — HEPARIN SODIUM 14.5 UNIT(S)/HR: 5000 INJECTION INTRAVENOUS; SUBCUTANEOUS at 01:33

## 2022-01-01 RX ADMIN — FINASTERIDE 5 MILLIGRAM(S): 5 TABLET, FILM COATED ORAL at 12:00

## 2022-01-01 RX ADMIN — HEPARIN SODIUM 10 UNIT(S)/HR: 5000 INJECTION INTRAVENOUS; SUBCUTANEOUS at 18:47

## 2022-01-01 RX ADMIN — FINASTERIDE 5 MILLIGRAM(S): 5 TABLET, FILM COATED ORAL at 12:56

## 2022-01-01 RX ADMIN — Medication 20 MICROGRAM(S): at 22:49

## 2022-01-01 RX ADMIN — GABAPENTIN 100 MILLIGRAM(S): 400 CAPSULE ORAL at 23:39

## 2022-01-01 RX ADMIN — INSULIN GLARGINE 30 UNIT(S): 100 INJECTION, SOLUTION SUBCUTANEOUS at 21:50

## 2022-01-01 RX ADMIN — Medication 25 MICROGRAM(S): at 06:16

## 2022-01-01 RX ADMIN — Medication 81 MILLIGRAM(S): at 13:10

## 2022-01-01 RX ADMIN — Medication 25 MICROGRAM(S): at 06:33

## 2022-01-01 RX ADMIN — Medication 0.5 MILLIGRAM(S): at 22:12

## 2022-01-01 RX ADMIN — Medication 650 MILLIGRAM(S): at 22:51

## 2022-01-01 RX ADMIN — PIPERACILLIN AND TAZOBACTAM 25 GRAM(S): 4; .5 INJECTION, POWDER, LYOPHILIZED, FOR SOLUTION INTRAVENOUS at 05:24

## 2022-01-01 RX ADMIN — HEPARIN SODIUM 18 UNIT(S)/HR: 5000 INJECTION INTRAVENOUS; SUBCUTANEOUS at 07:35

## 2022-01-01 RX ADMIN — GABAPENTIN 300 MILLIGRAM(S): 400 CAPSULE ORAL at 05:29

## 2022-01-01 RX ADMIN — Medication 0.5 MILLIGRAM(S): at 08:14

## 2022-01-01 RX ADMIN — SENNA PLUS 2 TABLET(S): 8.6 TABLET ORAL at 21:07

## 2022-01-01 RX ADMIN — Medication 1 APPLICATION(S): at 12:16

## 2022-01-01 RX ADMIN — Medication 5 UNIT(S): at 18:24

## 2022-01-01 RX ADMIN — Medication 81 MILLIGRAM(S): at 12:17

## 2022-01-01 RX ADMIN — INSULIN GLARGINE 30 UNIT(S): 100 INJECTION, SOLUTION SUBCUTANEOUS at 21:26

## 2022-01-01 RX ADMIN — ENOXAPARIN SODIUM 90 MILLIGRAM(S): 100 INJECTION SUBCUTANEOUS at 18:02

## 2022-01-01 RX ADMIN — CEFTRIAXONE 100 MILLIGRAM(S): 500 INJECTION, POWDER, FOR SOLUTION INTRAMUSCULAR; INTRAVENOUS at 09:02

## 2022-01-01 RX ADMIN — INSULIN GLARGINE 7 UNIT(S): 100 INJECTION, SOLUTION SUBCUTANEOUS at 21:33

## 2022-01-01 RX ADMIN — Medication 260 MILLIGRAM(S): at 04:24

## 2022-01-01 RX ADMIN — MONTELUKAST 10 MILLIGRAM(S): 4 TABLET, CHEWABLE ORAL at 14:11

## 2022-01-01 RX ADMIN — Medication 0.5 MILLIGRAM(S): at 21:42

## 2022-01-01 RX ADMIN — GABAPENTIN 300 MILLIGRAM(S): 400 CAPSULE ORAL at 17:37

## 2022-01-01 RX ADMIN — SENNA PLUS 2 TABLET(S): 8.6 TABLET ORAL at 23:33

## 2022-01-01 RX ADMIN — Medication 1200 MILLIGRAM(S): at 06:48

## 2022-01-01 RX ADMIN — Medication 1 APPLICATION(S): at 12:27

## 2022-01-01 RX ADMIN — Medication 975 MILLIGRAM(S): at 22:08

## 2022-01-01 RX ADMIN — Medication 166.67 MILLIGRAM(S): at 17:30

## 2022-01-01 RX ADMIN — PIPERACILLIN AND TAZOBACTAM 200 GRAM(S): 4; .5 INJECTION, POWDER, LYOPHILIZED, FOR SOLUTION INTRAVENOUS at 07:19

## 2022-01-01 RX ADMIN — Medication 1 APPLICATION(S): at 12:11

## 2022-01-01 RX ADMIN — TAMSULOSIN HYDROCHLORIDE 0.4 MILLIGRAM(S): 0.4 CAPSULE ORAL at 22:08

## 2022-01-01 RX ADMIN — Medication 1 APPLICATION(S): at 15:14

## 2022-01-01 RX ADMIN — Medication 1 APPLICATION(S): at 19:08

## 2022-01-01 RX ADMIN — Medication 975 MILLIGRAM(S): at 10:03

## 2022-01-01 RX ADMIN — Medication 3 MILLILITER(S): at 17:14

## 2022-01-01 RX ADMIN — Medication 4: at 09:16

## 2022-01-01 RX ADMIN — HYDROMORPHONE HYDROCHLORIDE 0.5 MILLIGRAM(S): 2 INJECTION INTRAMUSCULAR; INTRAVENOUS; SUBCUTANEOUS at 12:00

## 2022-01-01 RX ADMIN — Medication 20 MILLIGRAM(S): at 17:34

## 2022-01-01 RX ADMIN — Medication 8 UNIT(S): at 14:45

## 2022-01-01 RX ADMIN — Medication 40 MILLIEQUIVALENT(S): at 05:15

## 2022-01-01 RX ADMIN — GABAPENTIN 100 MILLIGRAM(S): 400 CAPSULE ORAL at 18:00

## 2022-01-01 RX ADMIN — Medication 1200 MILLIGRAM(S): at 05:40

## 2022-01-01 RX ADMIN — FINASTERIDE 5 MILLIGRAM(S): 5 TABLET, FILM COATED ORAL at 11:32

## 2022-01-01 RX ADMIN — TAMSULOSIN HYDROCHLORIDE 0.8 MILLIGRAM(S): 0.4 CAPSULE ORAL at 22:01

## 2022-01-01 RX ADMIN — Medication 25 MILLIGRAM(S): at 06:21

## 2022-01-01 RX ADMIN — INSULIN HUMAN 3 UNIT(S)/HR: 100 INJECTION, SOLUTION SUBCUTANEOUS at 20:15

## 2022-01-01 RX ADMIN — Medication 1 UNIT(S): at 09:03

## 2022-01-01 RX ADMIN — Medication 1: at 22:27

## 2022-01-01 RX ADMIN — Medication 400 MILLIGRAM(S): at 23:19

## 2022-01-01 RX ADMIN — Medication 2: at 18:14

## 2022-01-01 RX ADMIN — Medication 25 MICROGRAM(S): at 06:00

## 2022-01-01 RX ADMIN — Medication 1 UNIT(S): at 18:41

## 2022-01-01 RX ADMIN — Medication 0.5 MILLIGRAM(S): at 11:03

## 2022-01-01 RX ADMIN — POLYETHYLENE GLYCOL 3350 17 GRAM(S): 17 POWDER, FOR SOLUTION ORAL at 14:13

## 2022-01-01 RX ADMIN — Medication 975 MILLIGRAM(S): at 05:40

## 2022-01-01 RX ADMIN — Medication 3 MILLILITER(S): at 17:51

## 2022-01-01 RX ADMIN — PIPERACILLIN AND TAZOBACTAM 25 GRAM(S): 4; .5 INJECTION, POWDER, LYOPHILIZED, FOR SOLUTION INTRAVENOUS at 14:16

## 2022-01-01 RX ADMIN — Medication 1 APPLICATION(S): at 12:31

## 2022-01-01 RX ADMIN — Medication 3 MILLILITER(S): at 17:42

## 2022-01-01 RX ADMIN — Medication 10 MILLILITER(S): at 23:19

## 2022-01-01 RX ADMIN — Medication 20 MILLIGRAM(S): at 17:06

## 2022-01-01 RX ADMIN — Medication 975 MILLIGRAM(S): at 19:00

## 2022-01-01 RX ADMIN — Medication 20 MILLIGRAM(S): at 05:31

## 2022-01-01 RX ADMIN — Medication 40 MILLIEQUIVALENT(S): at 07:06

## 2022-01-01 RX ADMIN — Medication 81 MILLIGRAM(S): at 11:25

## 2022-01-01 RX ADMIN — OXYCODONE HYDROCHLORIDE 5 MILLIGRAM(S): 5 TABLET ORAL at 19:44

## 2022-01-01 RX ADMIN — Medication 7 UNIT(S): at 13:24

## 2022-01-01 RX ADMIN — GABAPENTIN 100 MILLIGRAM(S): 400 CAPSULE ORAL at 05:29

## 2022-01-01 RX ADMIN — Medication 0.5 MILLIGRAM(S): at 06:25

## 2022-01-01 RX ADMIN — Medication 975 MILLIGRAM(S): at 11:27

## 2022-01-01 RX ADMIN — TRAMADOL HYDROCHLORIDE 50 MILLIGRAM(S): 50 TABLET ORAL at 22:15

## 2022-01-01 RX ADMIN — POLYETHYLENE GLYCOL 3350 17 GRAM(S): 17 POWDER, FOR SOLUTION ORAL at 17:54

## 2022-01-01 RX ADMIN — Medication 20 MICROGRAM(S): at 21:40

## 2022-01-01 RX ADMIN — ATORVASTATIN CALCIUM 40 MILLIGRAM(S): 80 TABLET, FILM COATED ORAL at 21:12

## 2022-01-01 RX ADMIN — PANTOPRAZOLE SODIUM 40 MILLIGRAM(S): 20 TABLET, DELAYED RELEASE ORAL at 05:28

## 2022-01-01 RX ADMIN — Medication 80 MILLIGRAM(S): at 06:09

## 2022-01-01 RX ADMIN — TAMSULOSIN HYDROCHLORIDE 0.8 MILLIGRAM(S): 0.4 CAPSULE ORAL at 21:07

## 2022-01-01 RX ADMIN — GABAPENTIN 100 MILLIGRAM(S): 400 CAPSULE ORAL at 17:06

## 2022-01-01 RX ADMIN — GABAPENTIN 300 MILLIGRAM(S): 400 CAPSULE ORAL at 05:06

## 2022-01-01 RX ADMIN — HEPARIN SODIUM 16 UNIT(S)/HR: 5000 INJECTION INTRAVENOUS; SUBCUTANEOUS at 09:06

## 2022-01-01 RX ADMIN — Medication 650 MILLIGRAM(S): at 01:14

## 2022-01-01 RX ADMIN — TAMSULOSIN HYDROCHLORIDE 0.8 MILLIGRAM(S): 0.4 CAPSULE ORAL at 21:49

## 2022-01-01 RX ADMIN — MONTELUKAST 10 MILLIGRAM(S): 4 TABLET, CHEWABLE ORAL at 22:31

## 2022-01-01 RX ADMIN — TRAMADOL HYDROCHLORIDE 25 MILLIGRAM(S): 50 TABLET ORAL at 01:09

## 2022-01-01 RX ADMIN — HEPARIN SODIUM 14.5 UNIT(S)/HR: 5000 INJECTION INTRAVENOUS; SUBCUTANEOUS at 08:12

## 2022-01-01 RX ADMIN — MORPHINE SULFATE 2 MILLIGRAM(S): 50 CAPSULE, EXTENDED RELEASE ORAL at 22:18

## 2022-01-01 RX ADMIN — Medication 3 MILLILITER(S): at 21:23

## 2022-01-01 RX ADMIN — Medication 12 UNIT(S): at 17:13

## 2022-01-01 RX ADMIN — Medication 975 MILLIGRAM(S): at 17:07

## 2022-01-01 RX ADMIN — SODIUM CHLORIDE 60 MILLILITER(S): 9 INJECTION, SOLUTION INTRAVENOUS at 20:41

## 2022-01-01 RX ADMIN — HEPARIN SODIUM 8 UNIT(S)/HR: 5000 INJECTION INTRAVENOUS; SUBCUTANEOUS at 02:57

## 2022-01-01 RX ADMIN — Medication 1: at 09:06

## 2022-01-01 RX ADMIN — Medication 2: at 18:40

## 2022-01-01 RX ADMIN — Medication 50 MILLIGRAM(S): at 06:34

## 2022-01-01 RX ADMIN — ATORVASTATIN CALCIUM 80 MILLIGRAM(S): 80 TABLET, FILM COATED ORAL at 22:58

## 2022-01-01 RX ADMIN — SODIUM CHLORIDE 4 MILLILITER(S): 9 INJECTION INTRAMUSCULAR; INTRAVENOUS; SUBCUTANEOUS at 13:46

## 2022-01-01 RX ADMIN — Medication 1000 MILLIGRAM(S): at 22:08

## 2022-01-01 RX ADMIN — PIPERACILLIN AND TAZOBACTAM 25 GRAM(S): 4; .5 INJECTION, POWDER, LYOPHILIZED, FOR SOLUTION INTRAVENOUS at 05:03

## 2022-01-01 RX ADMIN — Medication 20 MILLIGRAM(S): at 00:06

## 2022-01-01 RX ADMIN — PIPERACILLIN AND TAZOBACTAM 25 GRAM(S): 4; .5 INJECTION, POWDER, LYOPHILIZED, FOR SOLUTION INTRAVENOUS at 05:19

## 2022-01-01 RX ADMIN — Medication 40 MILLIGRAM(S): at 18:08

## 2022-01-01 RX ADMIN — Medication 40 MILLIGRAM(S): at 17:26

## 2022-01-01 RX ADMIN — Medication 3 MILLILITER(S): at 12:12

## 2022-01-01 RX ADMIN — Medication 1200 MILLIGRAM(S): at 17:12

## 2022-01-01 RX ADMIN — GABAPENTIN 100 MILLIGRAM(S): 400 CAPSULE ORAL at 18:17

## 2022-01-01 RX ADMIN — FINASTERIDE 5 MILLIGRAM(S): 5 TABLET, FILM COATED ORAL at 13:46

## 2022-01-01 RX ADMIN — Medication 3 MILLILITER(S): at 10:50

## 2022-01-01 RX ADMIN — Medication 40 MILLIGRAM(S): at 09:07

## 2022-01-01 RX ADMIN — Medication 4 MILLILITER(S): at 22:59

## 2022-01-01 RX ADMIN — Medication 4: at 12:17

## 2022-01-01 RX ADMIN — Medication 1: at 13:03

## 2022-01-01 RX ADMIN — POLYETHYLENE GLYCOL 3350 17 GRAM(S): 17 POWDER, FOR SOLUTION ORAL at 13:52

## 2022-01-01 RX ADMIN — Medication 2 UNIT(S): at 12:26

## 2022-01-01 RX ADMIN — Medication 0.5 MILLIGRAM(S): at 05:16

## 2022-01-01 RX ADMIN — Medication 3 MILLILITER(S): at 05:15

## 2022-01-01 RX ADMIN — HYDROMORPHONE HYDROCHLORIDE 0.25 MILLIGRAM(S): 2 INJECTION INTRAMUSCULAR; INTRAVENOUS; SUBCUTANEOUS at 12:43

## 2022-01-01 RX ADMIN — Medication 650 MILLIGRAM(S): at 02:24

## 2022-01-01 RX ADMIN — MONTELUKAST 10 MILLIGRAM(S): 4 TABLET, CHEWABLE ORAL at 23:33

## 2022-01-01 RX ADMIN — PIPERACILLIN AND TAZOBACTAM 25 GRAM(S): 4; .5 INJECTION, POWDER, LYOPHILIZED, FOR SOLUTION INTRAVENOUS at 23:05

## 2022-01-01 RX ADMIN — Medication 975 MILLIGRAM(S): at 00:00

## 2022-01-01 RX ADMIN — Medication 975 MILLIGRAM(S): at 11:48

## 2022-01-01 RX ADMIN — Medication 20 MILLIGRAM(S): at 13:22

## 2022-01-01 RX ADMIN — INSULIN GLARGINE 7 UNIT(S): 100 INJECTION, SOLUTION SUBCUTANEOUS at 22:49

## 2022-01-01 RX ADMIN — INSULIN GLARGINE 26 UNIT(S): 100 INJECTION, SOLUTION SUBCUTANEOUS at 22:02

## 2022-01-01 RX ADMIN — Medication 3 MILLILITER(S): at 00:39

## 2022-01-01 RX ADMIN — GABAPENTIN 300 MILLIGRAM(S): 400 CAPSULE ORAL at 13:45

## 2022-01-01 RX ADMIN — Medication 975 MILLIGRAM(S): at 06:51

## 2022-01-01 RX ADMIN — PIPERACILLIN AND TAZOBACTAM 25 GRAM(S): 4; .5 INJECTION, POWDER, LYOPHILIZED, FOR SOLUTION INTRAVENOUS at 22:10

## 2022-01-01 RX ADMIN — Medication 3 MILLILITER(S): at 06:16

## 2022-01-01 RX ADMIN — OXYCODONE HYDROCHLORIDE 5 MILLIGRAM(S): 5 TABLET ORAL at 10:00

## 2022-01-01 RX ADMIN — Medication 3 MILLILITER(S): at 11:33

## 2022-01-01 RX ADMIN — Medication 1200 MILLIGRAM(S): at 05:41

## 2022-01-01 RX ADMIN — Medication 40 MILLIGRAM(S): at 05:51

## 2022-01-01 RX ADMIN — Medication 3 MILLILITER(S): at 08:58

## 2022-01-01 RX ADMIN — GABAPENTIN 300 MILLIGRAM(S): 400 CAPSULE ORAL at 22:15

## 2022-01-01 RX ADMIN — Medication 0.5 MILLIGRAM(S): at 18:06

## 2022-01-01 RX ADMIN — POLYETHYLENE GLYCOL 3350 17 GRAM(S): 17 POWDER, FOR SOLUTION ORAL at 17:33

## 2022-01-01 RX ADMIN — TRAMADOL HYDROCHLORIDE 50 MILLIGRAM(S): 50 TABLET ORAL at 10:33

## 2022-01-01 RX ADMIN — Medication 20 UNIT(S): at 09:11

## 2022-01-01 RX ADMIN — PANTOPRAZOLE SODIUM 40 MILLIGRAM(S): 20 TABLET, DELAYED RELEASE ORAL at 05:09

## 2022-01-01 RX ADMIN — Medication 3 MILLILITER(S): at 05:44

## 2022-01-01 RX ADMIN — Medication 4: at 17:49

## 2022-01-01 RX ADMIN — Medication 0.5 MILLIGRAM(S): at 06:16

## 2022-01-01 RX ADMIN — Medication 1: at 14:49

## 2022-01-01 RX ADMIN — Medication 975 MILLIGRAM(S): at 22:46

## 2022-01-01 RX ADMIN — SACUBITRIL AND VALSARTAN 1 TABLET(S): 24; 26 TABLET, FILM COATED ORAL at 05:16

## 2022-01-01 RX ADMIN — Medication 6 UNIT(S): at 13:32

## 2022-01-01 RX ADMIN — Medication 975 MILLIGRAM(S): at 23:57

## 2022-01-01 RX ADMIN — OXYCODONE HYDROCHLORIDE 5 MILLIGRAM(S): 5 TABLET ORAL at 13:17

## 2022-01-01 RX ADMIN — OXYCODONE HYDROCHLORIDE 10 MILLIGRAM(S): 5 TABLET ORAL at 05:07

## 2022-01-01 RX ADMIN — Medication 1 TABLET(S): at 12:40

## 2022-01-01 RX ADMIN — Medication 650 MILLIGRAM(S): at 06:26

## 2022-01-01 RX ADMIN — Medication 650 MILLIGRAM(S): at 16:33

## 2022-01-01 RX ADMIN — GABAPENTIN 300 MILLIGRAM(S): 400 CAPSULE ORAL at 05:30

## 2022-01-01 RX ADMIN — OXYCODONE HYDROCHLORIDE 5 MILLIGRAM(S): 5 TABLET ORAL at 05:33

## 2022-01-01 RX ADMIN — FINASTERIDE 5 MILLIGRAM(S): 5 TABLET, FILM COATED ORAL at 12:41

## 2022-01-01 RX ADMIN — HEPARIN SODIUM 20 UNIT(S)/HR: 5000 INJECTION INTRAVENOUS; SUBCUTANEOUS at 17:17

## 2022-01-01 RX ADMIN — TRAMADOL HYDROCHLORIDE 25 MILLIGRAM(S): 50 TABLET ORAL at 01:45

## 2022-01-01 RX ADMIN — ATORVASTATIN CALCIUM 80 MILLIGRAM(S): 80 TABLET, FILM COATED ORAL at 22:14

## 2022-01-01 RX ADMIN — Medication 1 APPLICATION(S): at 12:06

## 2022-01-01 RX ADMIN — Medication 975 MILLIGRAM(S): at 14:09

## 2022-01-01 RX ADMIN — SODIUM CHLORIDE 4 MILLILITER(S): 9 INJECTION INTRAMUSCULAR; INTRAVENOUS; SUBCUTANEOUS at 06:03

## 2022-01-01 RX ADMIN — Medication 2: at 09:40

## 2022-01-01 RX ADMIN — Medication 6: at 18:01

## 2022-01-01 RX ADMIN — Medication 81 MILLIGRAM(S): at 16:06

## 2022-01-01 RX ADMIN — MONTELUKAST 10 MILLIGRAM(S): 4 TABLET, CHEWABLE ORAL at 11:27

## 2022-01-01 RX ADMIN — Medication 1 APPLICATION(S): at 12:18

## 2022-01-01 RX ADMIN — PANTOPRAZOLE SODIUM 40 MILLIGRAM(S): 20 TABLET, DELAYED RELEASE ORAL at 03:58

## 2022-01-01 RX ADMIN — MONTELUKAST 10 MILLIGRAM(S): 4 TABLET, CHEWABLE ORAL at 11:33

## 2022-01-01 RX ADMIN — PANTOPRAZOLE SODIUM 40 MILLIGRAM(S): 20 TABLET, DELAYED RELEASE ORAL at 06:53

## 2022-01-01 RX ADMIN — ATORVASTATIN CALCIUM 80 MILLIGRAM(S): 80 TABLET, FILM COATED ORAL at 21:38

## 2022-01-01 RX ADMIN — Medication 650 MILLIGRAM(S): at 09:41

## 2022-01-01 RX ADMIN — Medication 0.5 MILLIGRAM(S): at 17:17

## 2022-01-01 RX ADMIN — Medication 650 MILLIGRAM(S): at 09:27

## 2022-01-01 RX ADMIN — Medication 40 MILLIGRAM(S): at 12:33

## 2022-01-01 RX ADMIN — Medication 650 MILLIGRAM(S): at 13:19

## 2022-01-01 RX ADMIN — POLYETHYLENE GLYCOL 3350 17 GRAM(S): 17 POWDER, FOR SOLUTION ORAL at 12:27

## 2022-01-01 RX ADMIN — SODIUM CHLORIDE 75 MILLILITER(S): 9 INJECTION, SOLUTION INTRAVENOUS at 11:38

## 2022-01-01 RX ADMIN — Medication 975 MILLIGRAM(S): at 09:03

## 2022-01-01 RX ADMIN — HEPARIN SODIUM 12 UNIT(S)/HR: 5000 INJECTION INTRAVENOUS; SUBCUTANEOUS at 07:51

## 2022-01-01 RX ADMIN — Medication 25 MILLIGRAM(S): at 05:42

## 2022-01-01 RX ADMIN — Medication 2: at 08:16

## 2022-01-01 RX ADMIN — OXYCODONE HYDROCHLORIDE 5 MILLIGRAM(S): 5 TABLET ORAL at 06:24

## 2022-01-01 RX ADMIN — CEFTRIAXONE 100 MILLIGRAM(S): 500 INJECTION, POWDER, FOR SOLUTION INTRAMUSCULAR; INTRAVENOUS at 08:58

## 2022-01-01 RX ADMIN — Medication 4 MILLILITER(S): at 09:11

## 2022-01-01 RX ADMIN — INSULIN GLARGINE 34 UNIT(S): 100 INJECTION, SOLUTION SUBCUTANEOUS at 21:16

## 2022-01-01 RX ADMIN — Medication 3: at 21:03

## 2022-01-01 RX ADMIN — Medication 3 MILLILITER(S): at 11:43

## 2022-01-01 RX ADMIN — Medication 0.5 MILLIGRAM(S): at 06:15

## 2022-01-01 RX ADMIN — Medication 1 APPLICATION(S): at 12:34

## 2022-01-01 RX ADMIN — Medication 0.5 MILLIGRAM(S): at 13:20

## 2022-01-01 RX ADMIN — Medication 4: at 17:50

## 2022-01-01 RX ADMIN — Medication 20 MILLIGRAM(S): at 06:21

## 2022-01-01 RX ADMIN — Medication 50 MILLIGRAM(S): at 05:32

## 2022-01-01 RX ADMIN — GABAPENTIN 100 MILLIGRAM(S): 400 CAPSULE ORAL at 14:37

## 2022-01-01 RX ADMIN — PIPERACILLIN AND TAZOBACTAM 25 GRAM(S): 4; .5 INJECTION, POWDER, LYOPHILIZED, FOR SOLUTION INTRAVENOUS at 05:53

## 2022-01-01 RX ADMIN — Medication 0.5 MILLIGRAM(S): at 06:52

## 2022-01-01 RX ADMIN — Medication 25 MICROGRAM(S): at 06:06

## 2022-01-01 RX ADMIN — HEPARIN SODIUM 12 UNIT(S)/HR: 5000 INJECTION INTRAVENOUS; SUBCUTANEOUS at 08:07

## 2022-01-01 RX ADMIN — INSULIN GLARGINE 10 UNIT(S): 100 INJECTION, SOLUTION SUBCUTANEOUS at 22:37

## 2022-01-01 RX ADMIN — Medication 2: at 10:10

## 2022-01-01 RX ADMIN — Medication 81 MILLIGRAM(S): at 12:59

## 2022-01-01 RX ADMIN — Medication 20 MILLIGRAM(S): at 18:17

## 2022-01-01 RX ADMIN — Medication 0.5 MILLIGRAM(S): at 17:06

## 2022-01-01 RX ADMIN — TRAMADOL HYDROCHLORIDE 50 MILLIGRAM(S): 50 TABLET ORAL at 11:20

## 2022-01-01 RX ADMIN — MONTELUKAST 10 MILLIGRAM(S): 4 TABLET, CHEWABLE ORAL at 12:08

## 2022-01-01 RX ADMIN — SACUBITRIL AND VALSARTAN 1 TABLET(S): 24; 26 TABLET, FILM COATED ORAL at 05:51

## 2022-01-01 RX ADMIN — Medication 100 MILLIEQUIVALENT(S): at 01:48

## 2022-01-01 RX ADMIN — GABAPENTIN 300 MILLIGRAM(S): 400 CAPSULE ORAL at 13:05

## 2022-01-01 RX ADMIN — ATORVASTATIN CALCIUM 40 MILLIGRAM(S): 80 TABLET, FILM COATED ORAL at 21:03

## 2022-01-01 RX ADMIN — HEPARIN SODIUM 12 UNIT(S)/HR: 5000 INJECTION INTRAVENOUS; SUBCUTANEOUS at 01:29

## 2022-01-01 RX ADMIN — Medication 1 APPLICATION(S): at 17:44

## 2022-01-01 RX ADMIN — Medication 975 MILLIGRAM(S): at 18:31

## 2022-01-01 RX ADMIN — PANTOPRAZOLE SODIUM 40 MILLIGRAM(S): 20 TABLET, DELAYED RELEASE ORAL at 05:37

## 2022-01-01 RX ADMIN — Medication 650 MILLIGRAM(S): at 21:12

## 2022-01-01 RX ADMIN — TRAMADOL HYDROCHLORIDE 50 MILLIGRAM(S): 50 TABLET ORAL at 09:04

## 2022-01-01 RX ADMIN — Medication 4: at 12:36

## 2022-01-01 RX ADMIN — Medication 5 UNIT(S): at 14:17

## 2022-01-01 RX ADMIN — Medication 15 UNIT(S): at 13:19

## 2022-01-01 RX ADMIN — ATORVASTATIN CALCIUM 80 MILLIGRAM(S): 80 TABLET, FILM COATED ORAL at 22:31

## 2022-01-01 RX ADMIN — OXYCODONE HYDROCHLORIDE 5 MILLIGRAM(S): 5 TABLET ORAL at 23:33

## 2022-01-01 RX ADMIN — HEPARIN SODIUM 12 UNIT(S)/HR: 5000 INJECTION INTRAVENOUS; SUBCUTANEOUS at 11:24

## 2022-01-01 RX ADMIN — Medication 10 UNIT(S): at 18:06

## 2022-01-01 RX ADMIN — PIPERACILLIN AND TAZOBACTAM 25 GRAM(S): 4; .5 INJECTION, POWDER, LYOPHILIZED, FOR SOLUTION INTRAVENOUS at 13:44

## 2022-01-01 RX ADMIN — Medication 650 MILLIGRAM(S): at 04:35

## 2022-01-01 RX ADMIN — Medication 3 MILLILITER(S): at 05:32

## 2022-01-01 RX ADMIN — OXYCODONE HYDROCHLORIDE 5 MILLIGRAM(S): 5 TABLET ORAL at 06:02

## 2022-01-01 RX ADMIN — Medication 975 MILLIGRAM(S): at 23:05

## 2022-01-01 RX ADMIN — GABAPENTIN 100 MILLIGRAM(S): 400 CAPSULE ORAL at 12:41

## 2022-01-01 RX ADMIN — ATORVASTATIN CALCIUM 40 MILLIGRAM(S): 80 TABLET, FILM COATED ORAL at 21:00

## 2022-01-01 RX ADMIN — Medication 10 MILLILITER(S): at 06:51

## 2022-01-01 RX ADMIN — Medication 3 MILLILITER(S): at 06:05

## 2022-01-01 RX ADMIN — Medication 3 MILLILITER(S): at 17:11

## 2022-01-01 RX ADMIN — Medication 6: at 13:24

## 2022-01-01 RX ADMIN — TRAMADOL HYDROCHLORIDE 50 MILLIGRAM(S): 50 TABLET ORAL at 21:02

## 2022-01-01 RX ADMIN — FINASTERIDE 5 MILLIGRAM(S): 5 TABLET, FILM COATED ORAL at 13:10

## 2022-01-01 RX ADMIN — Medication 1: at 17:59

## 2022-01-01 RX ADMIN — GABAPENTIN 300 MILLIGRAM(S): 400 CAPSULE ORAL at 05:40

## 2022-01-01 RX ADMIN — Medication 4: at 13:19

## 2022-01-01 RX ADMIN — HEPARIN SODIUM 12 UNIT(S)/HR: 5000 INJECTION INTRAVENOUS; SUBCUTANEOUS at 07:02

## 2022-01-01 RX ADMIN — Medication 3: at 18:11

## 2022-01-01 RX ADMIN — Medication 3 MILLILITER(S): at 18:48

## 2022-01-01 RX ADMIN — TAMSULOSIN HYDROCHLORIDE 0.8 MILLIGRAM(S): 0.4 CAPSULE ORAL at 22:31

## 2022-01-01 RX ADMIN — PANTOPRAZOLE SODIUM 40 MILLIGRAM(S): 20 TABLET, DELAYED RELEASE ORAL at 05:14

## 2022-01-01 RX ADMIN — Medication 0.5 MILLIGRAM(S): at 21:17

## 2022-01-01 RX ADMIN — HEPARIN SODIUM 18 UNIT(S)/HR: 5000 INJECTION INTRAVENOUS; SUBCUTANEOUS at 19:28

## 2022-01-01 RX ADMIN — WARFARIN SODIUM 10 MILLIGRAM(S): 2.5 TABLET ORAL at 22:17

## 2022-01-01 RX ADMIN — INSULIN GLARGINE 40 UNIT(S): 100 INJECTION, SOLUTION SUBCUTANEOUS at 21:33

## 2022-01-01 RX ADMIN — PANTOPRAZOLE SODIUM 40 MILLIGRAM(S): 20 TABLET, DELAYED RELEASE ORAL at 06:01

## 2022-01-01 RX ADMIN — Medication 3 MILLILITER(S): at 18:29

## 2022-01-01 RX ADMIN — Medication 1 TABLET(S): at 14:14

## 2022-01-01 RX ADMIN — Medication 25 MICROGRAM(S): at 05:05

## 2022-01-01 RX ADMIN — Medication 3 MILLILITER(S): at 09:34

## 2022-01-01 RX ADMIN — Medication 0.5 MILLIGRAM(S): at 21:49

## 2022-01-01 RX ADMIN — WARFARIN SODIUM 5 MILLIGRAM(S): 2.5 TABLET ORAL at 21:43

## 2022-01-01 RX ADMIN — POLYETHYLENE GLYCOL 3350 17 GRAM(S): 17 POWDER, FOR SOLUTION ORAL at 05:52

## 2022-01-01 RX ADMIN — FINASTERIDE 5 MILLIGRAM(S): 5 TABLET, FILM COATED ORAL at 16:07

## 2022-01-01 RX ADMIN — POLYETHYLENE GLYCOL 3350 17 GRAM(S): 17 POWDER, FOR SOLUTION ORAL at 17:15

## 2022-01-01 RX ADMIN — Medication 3 MILLILITER(S): at 06:33

## 2022-01-01 RX ADMIN — Medication 0.5 MILLIGRAM(S): at 18:39

## 2022-01-01 RX ADMIN — Medication 20 MILLIGRAM(S): at 11:14

## 2022-01-01 RX ADMIN — Medication 10 MILLILITER(S): at 00:06

## 2022-01-01 RX ADMIN — Medication 25 MICROGRAM(S): at 06:05

## 2022-01-01 RX ADMIN — Medication 3: at 09:12

## 2022-01-01 RX ADMIN — Medication 50 MILLIGRAM(S): at 17:15

## 2022-01-01 RX ADMIN — POLYETHYLENE GLYCOL 3350 17 GRAM(S): 17 POWDER, FOR SOLUTION ORAL at 05:30

## 2022-01-01 RX ADMIN — Medication 975 MILLIGRAM(S): at 06:21

## 2022-01-01 RX ADMIN — Medication 650 MILLIGRAM(S): at 09:08

## 2022-01-01 RX ADMIN — Medication 1 TABLET(S): at 12:27

## 2022-01-01 RX ADMIN — Medication 1 TABLET(S): at 12:47

## 2022-01-01 RX ADMIN — Medication 250 MILLIGRAM(S): at 06:34

## 2022-01-01 RX ADMIN — Medication 20 UNIT(S): at 13:17

## 2022-01-01 RX ADMIN — OXYCODONE HYDROCHLORIDE 5 MILLIGRAM(S): 5 TABLET ORAL at 11:03

## 2022-01-01 RX ADMIN — SENNA PLUS 2 TABLET(S): 8.6 TABLET ORAL at 22:58

## 2022-01-01 RX ADMIN — Medication 650 MILLIGRAM(S): at 17:33

## 2022-01-01 RX ADMIN — Medication 3 MILLILITER(S): at 04:12

## 2022-01-01 RX ADMIN — Medication 40 MILLIEQUIVALENT(S): at 16:27

## 2022-01-01 RX ADMIN — FINASTERIDE 5 MILLIGRAM(S): 5 TABLET, FILM COATED ORAL at 13:14

## 2022-01-01 RX ADMIN — Medication 975 MILLIGRAM(S): at 21:15

## 2022-01-01 RX ADMIN — LOSARTAN POTASSIUM 100 MILLIGRAM(S): 100 TABLET, FILM COATED ORAL at 06:02

## 2022-01-01 RX ADMIN — Medication 20 MILLIGRAM(S): at 05:54

## 2022-01-01 RX ADMIN — TRAMADOL HYDROCHLORIDE 50 MILLIGRAM(S): 50 TABLET ORAL at 12:11

## 2022-01-01 RX ADMIN — Medication 650 MILLIGRAM(S): at 18:04

## 2022-01-01 RX ADMIN — TRAMADOL HYDROCHLORIDE 50 MILLIGRAM(S): 50 TABLET ORAL at 13:36

## 2022-01-01 RX ADMIN — Medication 2: at 18:19

## 2022-01-01 RX ADMIN — Medication 3 MILLILITER(S): at 15:50

## 2022-01-01 RX ADMIN — POLYETHYLENE GLYCOL 3350 17 GRAM(S): 17 POWDER, FOR SOLUTION ORAL at 06:09

## 2022-01-01 RX ADMIN — Medication 10 MILLIGRAM(S): at 17:47

## 2022-01-01 RX ADMIN — Medication 40 MILLIGRAM(S): at 05:50

## 2022-01-01 RX ADMIN — ERYTHROPOIETIN 20000 UNIT(S): 10000 INJECTION, SOLUTION INTRAVENOUS; SUBCUTANEOUS at 13:58

## 2022-01-01 RX ADMIN — WARFARIN SODIUM 5 MILLIGRAM(S): 2.5 TABLET ORAL at 21:08

## 2022-01-01 RX ADMIN — ATORVASTATIN CALCIUM 40 MILLIGRAM(S): 80 TABLET, FILM COATED ORAL at 21:36

## 2022-01-01 RX ADMIN — Medication 3 MILLILITER(S): at 09:14

## 2022-01-01 RX ADMIN — GABAPENTIN 300 MILLIGRAM(S): 400 CAPSULE ORAL at 13:42

## 2022-01-01 RX ADMIN — GABAPENTIN 100 MILLIGRAM(S): 400 CAPSULE ORAL at 17:45

## 2022-01-01 RX ADMIN — HEPARIN SODIUM 16 UNIT(S)/HR: 5000 INJECTION INTRAVENOUS; SUBCUTANEOUS at 19:38

## 2022-01-01 RX ADMIN — INSULIN HUMAN 3 UNIT(S)/HR: 100 INJECTION, SOLUTION SUBCUTANEOUS at 18:03

## 2022-01-01 RX ADMIN — HYDROMORPHONE HYDROCHLORIDE 0.5 MILLIGRAM(S): 2 INJECTION INTRAMUSCULAR; INTRAVENOUS; SUBCUTANEOUS at 14:07

## 2022-01-01 RX ADMIN — Medication 100 MILLIEQUIVALENT(S): at 02:17

## 2022-01-01 RX ADMIN — GABAPENTIN 300 MILLIGRAM(S): 400 CAPSULE ORAL at 05:11

## 2022-01-01 RX ADMIN — Medication 650 MILLIGRAM(S): at 04:05

## 2022-01-01 RX ADMIN — Medication 3 MILLILITER(S): at 17:52

## 2022-01-01 RX ADMIN — Medication 25 MILLIGRAM(S): at 05:04

## 2022-01-01 RX ADMIN — Medication 25 MICROGRAM(S): at 05:37

## 2022-01-01 RX ADMIN — Medication 2: at 17:54

## 2022-01-01 RX ADMIN — Medication 0.5 MILLIGRAM(S): at 05:44

## 2022-01-01 RX ADMIN — TAMSULOSIN HYDROCHLORIDE 0.8 MILLIGRAM(S): 0.4 CAPSULE ORAL at 22:18

## 2022-01-01 RX ADMIN — OXYCODONE AND ACETAMINOPHEN 2 TABLET(S): 5; 325 TABLET ORAL at 23:05

## 2022-01-01 RX ADMIN — Medication 0.5 MILLIGRAM(S): at 17:37

## 2022-01-01 RX ADMIN — Medication 975 MILLIGRAM(S): at 18:16

## 2022-01-01 RX ADMIN — Medication 80 MILLIGRAM(S): at 05:28

## 2022-01-01 RX ADMIN — Medication 1 TABLET(S): at 12:11

## 2022-01-01 RX ADMIN — Medication 20 MILLIGRAM(S): at 05:29

## 2022-01-01 RX ADMIN — ENOXAPARIN SODIUM 90 MILLIGRAM(S): 100 INJECTION SUBCUTANEOUS at 18:06

## 2022-01-01 RX ADMIN — Medication 40 MILLIEQUIVALENT(S): at 06:32

## 2022-01-01 RX ADMIN — Medication 1: at 11:28

## 2022-01-01 RX ADMIN — Medication 20 MILLIGRAM(S): at 01:01

## 2022-01-01 RX ADMIN — Medication 100 MILLIGRAM(S): at 18:15

## 2022-01-01 RX ADMIN — ATORVASTATIN CALCIUM 40 MILLIGRAM(S): 80 TABLET, FILM COATED ORAL at 22:17

## 2022-01-01 RX ADMIN — MONTELUKAST 10 MILLIGRAM(S): 4 TABLET, CHEWABLE ORAL at 10:50

## 2022-01-01 RX ADMIN — Medication 25 MICROGRAM(S): at 06:21

## 2022-01-01 RX ADMIN — PANTOPRAZOLE SODIUM 40 MILLIGRAM(S): 20 TABLET, DELAYED RELEASE ORAL at 03:11

## 2022-01-01 RX ADMIN — HEPARIN SODIUM 18 UNIT(S)/HR: 5000 INJECTION INTRAVENOUS; SUBCUTANEOUS at 20:47

## 2022-01-01 RX ADMIN — MONTELUKAST 10 MILLIGRAM(S): 4 TABLET, CHEWABLE ORAL at 12:12

## 2022-01-01 RX ADMIN — Medication 3 MILLILITER(S): at 09:41

## 2022-01-01 RX ADMIN — Medication 3 MILLILITER(S): at 11:32

## 2022-01-01 RX ADMIN — Medication 3 MILLILITER(S): at 11:22

## 2022-01-01 RX ADMIN — TRAMADOL HYDROCHLORIDE 25 MILLIGRAM(S): 50 TABLET ORAL at 22:45

## 2022-01-01 RX ADMIN — TRAMADOL HYDROCHLORIDE 50 MILLIGRAM(S): 50 TABLET ORAL at 06:56

## 2022-01-01 RX ADMIN — GABAPENTIN 300 MILLIGRAM(S): 400 CAPSULE ORAL at 23:05

## 2022-01-01 RX ADMIN — Medication 1 APPLICATION(S): at 12:32

## 2022-01-01 RX ADMIN — Medication 25 MILLIGRAM(S): at 05:11

## 2022-01-01 RX ADMIN — Medication 0.5 MILLIGRAM(S): at 06:30

## 2022-01-01 RX ADMIN — Medication 25 MILLIGRAM(S): at 05:23

## 2022-01-01 RX ADMIN — Medication 5 UNIT(S): at 09:07

## 2022-01-01 RX ADMIN — TRAMADOL HYDROCHLORIDE 50 MILLIGRAM(S): 50 TABLET ORAL at 00:47

## 2022-01-01 RX ADMIN — PANTOPRAZOLE SODIUM 40 MILLIGRAM(S): 20 TABLET, DELAYED RELEASE ORAL at 05:29

## 2022-01-01 RX ADMIN — Medication 650 MILLIGRAM(S): at 21:06

## 2022-01-01 RX ADMIN — Medication 3 MILLILITER(S): at 20:41

## 2022-01-01 RX ADMIN — Medication 10: at 18:04

## 2022-01-01 RX ADMIN — TRAMADOL HYDROCHLORIDE 50 MILLIGRAM(S): 50 TABLET ORAL at 10:38

## 2022-01-01 RX ADMIN — Medication 1200 MILLIGRAM(S): at 06:26

## 2022-01-01 RX ADMIN — Medication 13 UNIT(S): at 09:43

## 2022-01-01 RX ADMIN — Medication 975 MILLIGRAM(S): at 23:06

## 2022-01-01 RX ADMIN — Medication 975 MILLIGRAM(S): at 22:00

## 2022-01-01 RX ADMIN — Medication 0.5 MILLIGRAM(S): at 05:42

## 2022-01-01 RX ADMIN — Medication 3 MILLILITER(S): at 15:04

## 2022-01-01 RX ADMIN — Medication 3 MILLILITER(S): at 22:16

## 2022-01-01 RX ADMIN — FINASTERIDE 5 MILLIGRAM(S): 5 TABLET, FILM COATED ORAL at 12:24

## 2022-01-01 RX ADMIN — ENOXAPARIN SODIUM 90 MILLIGRAM(S): 100 INJECTION SUBCUTANEOUS at 06:10

## 2022-01-01 RX ADMIN — Medication 20 MILLIGRAM(S): at 12:21

## 2022-01-01 RX ADMIN — INSULIN GLARGINE 30 UNIT(S): 100 INJECTION, SOLUTION SUBCUTANEOUS at 21:24

## 2022-01-01 RX ADMIN — Medication 650 MILLIGRAM(S): at 13:04

## 2022-01-01 RX ADMIN — CHLORHEXIDINE GLUCONATE 1 APPLICATION(S): 213 SOLUTION TOPICAL at 05:15

## 2022-01-01 RX ADMIN — Medication 2: at 08:28

## 2022-01-01 RX ADMIN — Medication 1 APPLICATION(S): at 13:21

## 2022-01-01 RX ADMIN — HEPARIN SODIUM 14.5 UNIT(S)/HR: 5000 INJECTION INTRAVENOUS; SUBCUTANEOUS at 09:31

## 2022-01-01 RX ADMIN — HEPARIN SODIUM 20 UNIT(S)/HR: 5000 INJECTION INTRAVENOUS; SUBCUTANEOUS at 21:24

## 2022-01-01 RX ADMIN — Medication 20 MILLIGRAM(S): at 05:39

## 2022-01-01 RX ADMIN — MORPHINE SULFATE 2 MILLIGRAM(S): 50 CAPSULE, EXTENDED RELEASE ORAL at 22:03

## 2022-01-01 RX ADMIN — TAMSULOSIN HYDROCHLORIDE 0.8 MILLIGRAM(S): 0.4 CAPSULE ORAL at 22:53

## 2022-01-01 RX ADMIN — Medication 10 UNIT(S): at 17:22

## 2022-01-01 RX ADMIN — Medication 975 MILLIGRAM(S): at 21:02

## 2022-01-01 RX ADMIN — Medication 1: at 13:14

## 2022-01-01 RX ADMIN — GABAPENTIN 300 MILLIGRAM(S): 400 CAPSULE ORAL at 21:03

## 2022-01-01 RX ADMIN — PANTOPRAZOLE SODIUM 40 MILLIGRAM(S): 20 TABLET, DELAYED RELEASE ORAL at 06:15

## 2022-01-01 RX ADMIN — Medication 3 MILLILITER(S): at 05:05

## 2022-01-01 RX ADMIN — GABAPENTIN 300 MILLIGRAM(S): 400 CAPSULE ORAL at 06:01

## 2022-01-01 RX ADMIN — Medication 40 MILLIGRAM(S): at 18:55

## 2022-01-01 RX ADMIN — Medication 1 APPLICATION(S): at 12:48

## 2022-01-01 RX ADMIN — Medication 100 MILLIEQUIVALENT(S): at 05:36

## 2022-01-01 RX ADMIN — PIPERACILLIN AND TAZOBACTAM 25 GRAM(S): 4; .5 INJECTION, POWDER, LYOPHILIZED, FOR SOLUTION INTRAVENOUS at 20:40

## 2022-01-01 RX ADMIN — Medication 100 MILLIGRAM(S): at 18:29

## 2022-01-01 RX ADMIN — GABAPENTIN 300 MILLIGRAM(S): 400 CAPSULE ORAL at 13:03

## 2022-02-15 PROBLEM — I10 HYPERTENSION: Status: ACTIVE | Noted: 2018-04-25

## 2022-02-15 PROBLEM — Z95.0 S/P PLACEMENT OF CARDIAC PACEMAKER: Status: ACTIVE | Noted: 2020-08-12

## 2022-02-15 PROBLEM — E78.5 HYPERLIPIDEMIA: Status: ACTIVE | Noted: 2018-08-09

## 2022-02-15 PROBLEM — N18.30 CKD (CHRONIC KIDNEY DISEASE) STAGE 3, GFR 30-59 ML/MIN: Status: ACTIVE | Noted: 2021-05-21

## 2022-02-15 PROBLEM — Z86.73 CEREBROVASCULAR ACCIDENT, OLD: Status: ACTIVE | Noted: 2018-08-09

## 2022-02-15 PROBLEM — I34.0 MITRAL REGURGITATION: Status: ACTIVE | Noted: 2019-07-11

## 2022-02-15 PROBLEM — I25.10 CAD (CORONARY ARTERY DISEASE): Status: ACTIVE | Noted: 2017-04-29

## 2022-02-15 PROBLEM — E11.9 DIABETES: Status: ACTIVE | Noted: 2017-04-29

## 2022-02-16 PROBLEM — I65.23 BILATERAL CAROTID ARTERY STENOSIS: Status: ACTIVE | Noted: 2019-07-11

## 2022-02-16 NOTE — DISCUSSION/SUMMARY
[FreeTextEntry1] : This is an 86-year-old male with past medical history significant for diabetes mellitus, status post permanent pacemaker in [place] (July 14, 2020 after syncopal episode when the patient was found to have Mobitz type II heart block),, paroxysmal atrial fibrillation, hypertension, hyperlipidemia, mitral valve regurgitation, history of cerebrovascular event, cholelithiasis viscous bile duct stent, coronary artery disease, who comes in for cardiac followup evaluation.\par He denies chest pain, chest pressure, PND, dizziness or syncope. The patient is currently mostly wheelchair-bound.\par Electrocardiogram done February 15, 2022 demonstrated evidence for ventricular pacemaker at a rate of 100 bpm.\par Patient gets regular INR blood test through his primary care physician in [place].\par He will continue on his current medications at this point in time.  He will have new blood work done for lipid panel, SMA-20, and TSH prior to his next visit.\par \par He will continue on his current dose of Crestor 10 mg/day in addition to his pulmonary medication.  \par Electrocardiogram done March 19, 2021 demonstrates sinus rhythm at a rate of 76 bpm with ventricular pacing.\par The patient will see Dr. 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  I recommended they see a vascular surgeon.  The wife reports that he has been evaluated for this before the surgeons did not want to operate.  They understand his risk of stroke, and death.\par \par He has a CHADSVASC SCORE OF 6 and remains on anticoagulation with warfarin. He follows up with you regarding his INR.\par He had a cardiac catheterization 2001 which demonstrated 20-30% lesion the right coronary artery.\par His LDL cholesterol goal should be less than 70 mg/dL.\par Leg elevation, salt restriction, and compression stockings were recommeneded.

## 2022-02-16 NOTE — PHYSICAL EXAM
[General Appearance - Well Developed] : well developed [Normal Appearance] : normal appearance [Well Groomed] : well groomed [General Appearance - Well Nourished] : well nourished [No Deformities] : no deformities [General Appearance - In No Acute Distress] : no acute distress [Normal Conjunctiva] : the conjunctiva exhibited no abnormalities [Normal Oral Mucosa] : normal oral mucosa [JVD Elevated _____cm] : JVD elevated [unfilled] ~U cm above clavicle [Normal Jugular Venous A Waves Present] : normal jugular venous A waves present [Normal Jugular Venous V Waves Present] : normal jugular venous V waves present [No Jugular Venous Moe A Waves] : no jugular venous moe A waves [Respiration, Rhythm And Depth] : normal respiratory rhythm and effort [Exaggerated Use Of Accessory Muscles For Inspiration] : no accessory muscle use [Auscultation Breath Sounds / Voice Sounds] : lungs were clear to auscultation bilaterally [Bowel Sounds] : normal bowel sounds [Abdomen Soft] : soft [Abnormal Walk] : normal gait [Nail Clubbing] : no clubbing of the fingernails [Cyanosis, Localized] : no localized cyanosis [Skin Color & Pigmentation] : normal skin color and pigmentation [Skin Turgor] : normal skin turgor [] : no rash [Oriented To Time, Place, And Person] : oriented to person, place, and time [Affect] : the affect was normal [Mood] : the mood was normal [No Anxiety] : not feeling anxious [5th Left ICS - MCL] : palpated at the 5th LICS in the midclavicular line [Normal] : normal [No Precordial Heave] : no precordial heave was noted [Normal Rate] : normal [Rhythm Regular] : regular [Normal S1] : normal S1 [Normal S2] : normal S2 [No Gallop] : no gallop heard [II] : a grade 2 [I] : a grade 1 [2+] : left 2+ [No Abnormalities] : the abdominal aorta was not enlarged and no bruit was heard [___ +] : bilateral [unfilled]U+ pretibial pitting edema [S3] : no S3 [S4] : no S4 [Click] : no click [Pericardial Rub] : no pericardial rub [Right Carotid Bruit] : no bruit heard over the right carotid [Left Carotid Bruit] : no bruit heard over the left carotid [Right Femoral Bruit] : no bruit heard over the right femoral artery [Left Femoral Bruit] : no bruit heard over the left femoral artery

## 2022-02-16 NOTE — REASON FOR VISIT
[Follow-Up - Clinic] : a clinic follow-up of [Atrial Fibrillation] : atrial fibrillation [Coronary Artery Disease] : coronary artery disease [Hyperlipidemia] : hyperlipidemia [Hypertension] : hypertension [Mitral Regurgitation] : mitral regurgitation [FreeTextEntry1] : bilateral lower extremity edema, h/o CVA, Diabetes

## 2022-02-18 PROBLEM — Z51.81 ANTICOAGULATION GOAL OF INR 2 TO 3: Status: ACTIVE | Noted: 2022-01-01

## 2022-02-18 PROBLEM — Z79.01 LONG TERM (CURRENT) USE OF ANTICOAGULANTS: Status: ACTIVE | Noted: 2022-01-01

## 2022-03-10 NOTE — PHYSICAL EXAM
[JVD] : no jugular venous distention  [Respiratory Effort] : normal respiratory effort [Normal Rate and Rhythm] : normal rate and rhythm [2+] : left 2+ [0] : left 0 [Ankle Swelling (On Exam)] : present [Ankle Swelling Bilaterally] : bilaterally  [Varicose Veins Of Lower Extremities] : not present [] : bilaterally [Ankle Swelling On The Left] : moderate [Skin Ulcer] : ulcer [Alert] : alert [Oriented to Person] : oriented to person [Oriented to Place] : oriented to place [Oriented to Time] : oriented to time [Calm] : calm [de-identified] : appears stated age [de-identified] : normocephalic, atraumatic [de-identified] : progression of right 1-3 toe gangrene, new anterior shin ulceration, right leg very edematous

## 2022-03-10 NOTE — HISTORY OF PRESENT ILLNESS
[FreeTextEntry1] : 86 year old male with hx of PVD presented with RLE ischemia affecting his R 2nd toe underwent diagnostic angiogram not a candidate for revascularization d [de-identified] : Rest pain in right toes and foot getting worse. Podiatrist now concerned foot is now longer salvageable. Patient presents for evaluation for amputation.

## 2022-03-10 NOTE — ASSESSMENT
[FreeTextEntry1] : Problem #1 chronic limb threatening ischemia of right leg\par - over 30 minutes spent face to face discussing right lower extremity amputation below the knee\par - will likely need to perform right open guillotine BKA given amount of edema \par - right deep femoral artery patent with in line flow so I suspect he will heal a BKA\par - will refer to [facility] \par - over 30 minutes spent in chart review and coordination of care

## 2022-03-15 PROBLEM — I48.91 ATRIAL FIBRILLATION: Status: ACTIVE | Noted: 2020-08-18

## 2022-03-15 PROBLEM — I44.30 AV BLOCK: Status: ACTIVE | Noted: 2020-09-04

## 2022-03-15 NOTE — DISCUSSION/SUMMARY
[FreeTextEntry1] : 85 y/o pleasant male with recent diagnosis of a second degree AV block status post pacemaker. The device is functioning well with good HR distribution. We discussed remote monitoring device for continuous monitoring. As far as his atrial fibrillation is concerned, he is currently anticoagulated and also managed on BB. He is usually not symptomatic during Afib and has very low burden. We discussed that if his Afib becomes progressive or the burden were to increase over time, we would then revisit Afib management/treatment. \par \par Interrogation today shows a slow underlying rhythm.  No AF. Normal function with adequate safety margins.  Follow up remote check in 6 months, in the office in 1 year. \par \par

## 2022-03-15 NOTE — HISTORY OF PRESENT ILLNESS
[de-identified] : 85 y/o male presenting today for heart block and afib evaluation. PMH of paroxysmal atrial fibrillation, CAD, hyperlipidemia, hypertension, cholelithiasis viscous bile duct stent, mitral regurgitation, h/o CVA (no residual symptoms), diabetes, B/L LE cellulitis. For observed type II second degree AV block and associated syncope, he underwent  Medtronic dual chamber pacemaker on 07/16/2020. \par \par He is anticipation partial RLE amputation in 2 weeks.  No other complaints. \par

## 2022-03-15 NOTE — PHYSICAL EXAM
[General Appearance - Well Developed] : well developed [Normal Appearance] : normal appearance [Well Groomed] : well groomed [General Appearance - Well Nourished] : well nourished [No Deformities] : no deformities [General Appearance - In No Acute Distress] : no acute distress [Heart Rate And Rhythm] : heart rate and rhythm were normal [Heart Sounds] : normal S1 and S2 [Arterial Pulses Normal] : the arterial pulses were normal [Respiration, Rhythm And Depth] : normal respiratory rhythm and effort [Exaggerated Use Of Accessory Muscles For Inspiration] : no accessory muscle use [Left Infraclavicular] : left infraclavicular area [Healing Well] : healing well [Abdomen Soft] : soft [Abdomen Tenderness] : non-tender [] : no hepato-splenomegaly [Abdomen Mass (___ Cm)] : no abdominal mass palpated [Nail Clubbing] : no clubbing of the fingernails [Cyanosis, Localized] : no localized cyanosis [FreeTextEntry1] : systolic murmur heard at LLSB, b/l 2+ pitting edema

## 2022-03-15 NOTE — PROCEDURE
[NSR] : normal sinus rhythm [Pacemaker] : pacemaker [DDDR] : DDDR [Longevity: ___ months] : The estimated remaining battery life is [unfilled] months [Lead Imp:  ___ohms] : lead impedance was [unfilled] ohms [Sensing Amplitude ___mv] : sensing amplitude was [unfilled] mv [___V @] : [unfilled] V [___ ms] : [unfilled] ms [de-identified] : with long first degree block [de-identified] : Medtronic [de-identified] : Dannielle EMERY DR MRI [id number] [de-identified] : [id number] [de-identified] : 07/16/2020 [de-identified] : 60

## 2022-04-01 NOTE — H&P ADULT - NSHPPHYSICALEXAM_GEN_ALL_CORE
Gen: NAD pleasant and conversant  HEENT: NC/AT EOMI  Neck: supple, FROM  Chest: symmetric chest rise, non-agonal on Room air  Abd: Soft, nontender, nondistended  Vascular: Unable to appreciate RLE pulses, 1+LLE pulses, pitting edema to knees bilaterally

## 2022-04-01 NOTE — H&P ADULT - ATTENDING COMMENTS
late entry  87 yo M w/ hx of PAD   long standing right foot ischemia  not a candidate for revascularization  tissue loss has progressed as outpatient  now being admitted for preoperative optimization for right above knee amputation on this admission  appreciate medical and cardiac risk stratification and clearance late entry  85 yo M w/ hx of PAD   long standing right foot ischemia  not a candidate for revascularization  tissue loss has progressed as outpatient  now being admitted for preoperative optimization for right below knee amputation on this admission  appreciate medical and cardiac risk stratification and clearance

## 2022-04-01 NOTE — PATIENT PROFILE ADULT - FALL HARM RISK - HARM RISK INTERVENTIONS

## 2022-04-01 NOTE — H&P ADULT - HISTORY OF PRESENT ILLNESS
CC: Sent in from office for right above knee amputation    HPI: Mr. Heart is an 86 year old gentleman with a PMH DM, HTN, HLD who has been followed for the past 6 months for foot wounds and leg pain. He underwent and angiogram with Dr. Quinn in september of 2021 and has been followed in the office. He reports having pain in the RLE mostly in the toes and has open wounds with some purulence and gangrene. The pain is worse at night when lying in bed, and improves with tylenol and dangling the foot off the bed. He is non-ambulatory at home.  He presents today from the office for preoperative planning and optimization for a RLE AKA on Monday 4/4 with Dr. Quinn.

## 2022-04-01 NOTE — H&P ADULT - TIME BILLING
right above knee amputation planning  prosthetic discussion  medical and cardiac risk stratification and optimization right below knee amputation planning  prosthetic discussion  medical and cardiac risk stratification and optimization

## 2022-04-01 NOTE — H&P ADULT - ASSESSMENT
Assessment: 86 year old man with HTN HLD DM and nonhealing wounds of RLE who is non-ambulatory at home presents for preoperative assessment and right above knee amputation.     Plan:  - Resume home meds  - Diabetic diet   - Holding coumadin, hep gtt  - Cardiology consult for perioperative risk assessment  - Medicine consult for perioperative risk assessment  - Plan for OR Monday 4/4 for right above knee amputation with Dr. Cheyenne Sultana MD, PGY1  Vascular Surgery   [id number]

## 2022-04-02 NOTE — CONSULT NOTE ADULT - ATTENDING COMMENTS
I have examined pt and agree with above exam and plan.  85 yo male ex smoker > 20 years with COPD for R AKA due PVD. Pt says he has had difficulty walking since last May. Prior to May he could walk one block on flat surface without SOB chest pain or difficulty. He continues do minor chores around the house without sob cough or wheezing. He has never been admitted to hospital or intubated for COPD. He does not have a chronic cough , wheezes or abnormal sputum production. He does not use bronchodilators. No h/o asthma. Pt does not remember having pulmonary function tests.      A/P. 85 yo ex smoker with h/o COPD for AKA.. Pt clinically with mild COPD. Recommend: 1. ABG to make sure pt not hypercapnic. 2. CXR.    Will follow with you.

## 2022-04-02 NOTE — PROGRESS NOTE ADULT - SUBJECTIVE AND OBJECTIVE BOX
Surgery Progress Note     Subjective/24hour Events: Patient seen and examined at the bedside this morning. No acute events overnight. Pain controlled.     Vital Signs:  Vital Signs Last 24 Hrs  T(C): 36.5 (02 Apr 2022 01:02), Max: 36.7 (01 Apr 2022 16:50)  T(F): 97.7 (02 Apr 2022 01:02), Max: 98 (01 Apr 2022 16:50)  HR: 95 (02 Apr 2022 01:02) (88 - 95)  BP: 131/64 (02 Apr 2022 01:02) (131/64 - 160/68)  BP(mean): --  RR: 18 (02 Apr 2022 01:02) (18 - 18)  SpO2: 92% (02 Apr 2022 01:02) (92% - 96%)        I&O's Detail    01 Apr 2022 07:01  -  02 Apr 2022 01:41  --------------------------------------------------------  IN:    Oral Fluid: 440 mL  Total IN: 440 mL    OUT:    Voided (mL): 500 mL  Total OUT: 500 mL    Total NET: -60 mL        Physical Exam: Gen: NAD pleasant and conversant  HEENT: NC/AT EOMI  Neck: supple, FROM  Chest: symmetric chest rise, non-agonal on Room air  Abd: Soft, nontender, nondistended  Vascular: Unable to appreciate RLE pulses, 1+LLE pulses, pitting edema to knees bilaterally    Labs:          CAPILLARY BLOOD GLUCOSE      POCT Blood Glucose.: 170 mg/dL (01 Apr 2022 21:38)  POCT Blood Glucose.: 136 mg/dL (01 Apr 2022 18:39)        PT/INR - ( 01 Apr 2022 18:23 )   PT: 24.0 sec;   INR: 2.05 ratio         PTT - ( 01 Apr 2022 18:23 )  PTT:35.1 sec

## 2022-04-02 NOTE — CONSULT NOTE ADULT - SUBJECTIVE AND OBJECTIVE BOX
PULMONARY SERVICE INITIAL CONSULT NOTE    HPI  86M with PMH of COPD (not on home o2), prior smoking history (40 pack years), HTN, HLD, Afib, CHF, T2DM, CVA, BPH, and PAD admitted for elective RLE AKA. The patient denies dyspnea or chest pain. Denies any fever, chills, nausea vomiting or diarrhea. Reports having a PPM place prior without issue. Denies any other primary pulmonary issue. Denies ever having a pneumonia. No recent hospitalizations.      REVIEW OF SYSTEMS:  All additional ROS negative.    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    MEDICATIONS:  Pulmonary:  albuterol/ipratropium for Nebulization 3 milliLiter(s) Nebulizer every 6 hours  buDESOnide    Inhalation Suspension 0.5 milliGRAM(s) Inhalation every 12 hours  montelukast 10 milliGRAM(s) Oral daily    Antimicrobials:    Anticoagulants:  aspirin  chewable 81 milliGRAM(s) Oral daily  heparin  Infusion 800 Unit(s)/Hr IV Continuous <Continuous>    Onc:    GI/:  pantoprazole    Tablet 40 milliGRAM(s) Oral before breakfast    Endocrine:  atorvastatin 40 milliGRAM(s) Oral at bedtime  dextrose 50% Injectable 25 Gram(s) IV Push once  dextrose 50% Injectable 12.5 Gram(s) IV Push once  dextrose 50% Injectable 25 Gram(s) IV Push once  dextrose 50% Injectable 25 Gram(s) IV Push once  dextrose Oral Gel 15 Gram(s) Oral once PRN  dextrose Oral Gel 15 Gram(s) Oral once PRN  finasteride 5 milliGRAM(s) Oral daily  glucagon  Injectable 1 milliGRAM(s) IntraMuscular once  glucagon  Injectable 1 milliGRAM(s) IntraMuscular once  insulin glargine Injectable (LANTUS) 30 Unit(s) SubCutaneous at bedtime  insulin lispro (ADMELOG) corrective regimen sliding scale   SubCutaneous three times a day before meals  levothyroxine 25 MICROGram(s) Oral daily    Cardiac:  furosemide    Tablet 20 milliGRAM(s) Oral two times a day  losartan 100 milliGRAM(s) Oral daily  metolazone 2.5 milliGRAM(s) Oral <User Schedule>  metoprolol succinate ER 25 milliGRAM(s) Oral daily  tamsulosin 0.8 milliGRAM(s) Oral at bedtime    Other Medications:  acetaminophen     Tablet .. 650 milliGRAM(s) Oral every 6 hours PRN  dextrose 5%. 1000 milliLiter(s) IV Continuous <Continuous>  dextrose 5%. 1000 milliLiter(s) IV Continuous <Continuous>  gabapentin 100 milliGRAM(s) Oral every 12 hours  multivitamin/minerals 1 Tablet(s) Oral daily  traMADol 50 milliGRAM(s) Oral every 12 hours PRN      Allergies    No Known Allergies    Intolerances        PHYSICAL EXAM  Vital Signs Last 24 Hrs  T(C): 36.4 (02 Apr 2022 13:23), Max: 36.7 (01 Apr 2022 16:50)  T(F): 97.6 (02 Apr 2022 13:23), Max: 98 (01 Apr 2022 16:50)  HR: 88 (02 Apr 2022 13:23) (81 - 95)  BP: 120/64 (02 Apr 2022 13:23) (116/56 - 160/68)  BP(mean): --  RR: 18 (02 Apr 2022 13:23) (18 - 18)  SpO2: 92% (02 Apr 2022 13:23) (92% - 96%)    04-01 @ 07:01 - 04-02 @ 07:00  --------------------------------------------------------  IN: 540 mL / OUT: 800 mL / NET: -260 mL    04-02 @ 07:01 - 04-02 @ 16:43  --------------------------------------------------------  IN: 240 mL / OUT: 300 mL / NET: -60 mL          CONSTITUTIONAL: No acute distress.   HEENT:  Conjunctiva clear B/L.  Moist oral mucosa.   Cardiovascular: RRR with no murmurs. No JVD noted. 2+ Lower extremity edema B/L. Extremities are warm and well perfused.    Respiratory: Dec bs at bases. Moving air well b/l. No wheezing or rales or rhonchi. No accessory muscle use.   Gastrointestinal:  Soft, nontender. Non-distended. Non-rigid.    Neurologic:  Alert and awake. Moving all extremities. Following commands.        LABS:      CBC Full  -  ( 02 Apr 2022 08:22 )  WBC Count : 7.27 K/uL  RBC Count : 3.21 M/uL  Hemoglobin : 7.9 g/dL  Hematocrit : 26.7 %  Platelet Count - Automated : 246 K/uL  Mean Cell Volume : 83.2 fl  Mean Cell Hemoglobin : 24.6 pg  Mean Cell Hemoglobin Concentration : 29.6 gm/dL  Auto Neutrophil # : x  Auto Lymphocyte # : x  Auto Monocyte # : x  Auto Eosinophil # : x  Auto Basophil # : x  Auto Neutrophil % : x  Auto Lymphocyte % : x  Auto Monocyte % : x  Auto Eosinophil % : x  Auto Basophil % : x    04-02    146<H>  |  104  |  41<H>  ----------------------------<  105<H>  3.4<L>   |  29  |  1.33<H>    Ca    9.3      02 Apr 2022 08:12  Phos  3.1     04-02  Mg     2.0     04-02      PT/INR - ( 01 Apr 2022 18:23 )   PT: 24.0 sec;   INR: 2.05 ratio         PTT - ( 02 Apr 2022 08:12 )  PTT:52.1 sec                  RADIOLOGY & ADDITIONAL STUDIES:

## 2022-04-02 NOTE — CONSULT NOTE ADULT - ASSESSMENT
86M with PMH of COPD (not on home o2), prior smoking history (40 pack years), HTN, HLD, Afib, CHF, T2DM, CVA, BPH, and PAD admitted for elective RLE AKA. Pulmonary called for preoperative evaluation.    Recommendations  -Not in acute copd exacerbation. Doesn't appear to have any acute pulmonary process at this time.   -Please obtain ABG and CXR  -C/w home montelukast and budesonide  -Can c/w duonebs q6 standing.   -All consultant contribution to care greatly appreciated.   -AKA planned for 4/4.   -Pulmonary will comment prior to OR date.     Dr. Jack Bertrand, DO  Pulmonary and Critical Care Medicine Fellow   Available via [employer] Teams - **Preferred**  Pulmonary [facility] #[id number] ([facility]) / [phone] ([facility])  Pager:  [phone]

## 2022-04-02 NOTE — CONSULT NOTE ADULT - ASSESSMENT
EKG - not in chart   Echo - pending     a/p     1) Preop clearance for AKA - pt has h/o moderate LV dysfunction as per echo 2017, no chest pains would get 2d echo and 12 lead EKG , will get PPM interrogated     2) HTN - continue losartan and metoprolol     3) Chronic systolic CHF - continue lasix, toprol and losartan     4) ?Atrial fib - coumadin on hold on IV heparin

## 2022-04-02 NOTE — CONSULT NOTE ADULT - SUBJECTIVE AND OBJECTIVE BOX
Ritesh Bell MD  Interventional Cardiology / Advance Heart Failure and Cardiac Transplant Specialist  [place] Office : [phone] [place] [address] [place] [address]  Tel:   [place] Office : [phone] [facility] [place] [address]  Tel: [phone]         HISTORY OF PRESENTING ILLNESS:  HPI:  HPI: Mr. Heart is an 86 year old gentleman with a PMH DM, HTN, HLD who has been followed for the past 6 months for foot wounds and leg pain. He underwent and angiogram with Dr. Quinn in september of 2021 and has been followed in the office. He reports having pain in the RLE mostly in the toes and has open wounds with some purulence and gangrene. The pain is worse at night when lying in bed, and improves with tylenol and dangling the foot off the bed. He is non-ambulatory at home.  He presents today from the office for preoperative planning and optimization for a RLE AKA on Monday 4/4 with Dr. Quinn.   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    Deep Vein Thrombosis (DVT)  17 yeras ago on Coumadin    CHF (congestive heart failure)  last exacerbation in 1/2017    Enlarged prostate    GERD (gastroesophageal reflux disease)    Hernia  umblical    Calculus of bile duct without cholangitis or cholecystitis without obstruction    Atrial fibrillation    S/P Hernia Repair    S/P cataract surgery, unspecified laterality    S/P ERCP  3/2017        SOCIAL HISTORY: Substance Use (street drugs): ( x ) never used  (  ) other:    FAMILY HISTORY:      REVIEW OF SYSTEMS:  CONSTITUTIONAL: No fever, weight loss, or fatigue  EYES: No eye pain, visual disturbances, or discharge  ENMT:  No difficulty hearing, tinnitus, vertigo; No sinus or throat pain  BREASTS: No pain, masses, or nipple discharge  GASTROINTESTINAL: No abdominal or epigastric pain. No nausea, vomiting, or hematemesis; No diarrhea or constipation. No melena or hematochezia.  GENITOURINARY: No dysuria, frequency, hematuria, or incontinence  NEUROLOGICAL: No headaches, memory loss, loss of strength, numbness, or tremors  ENDOCRINE: No heat or cold intolerance; No hair loss  MUSCULOSKELETAL: right toe gangrene   PSYCHIATRIC: No depression, anxiety, mood swings, or difficulty sleeping  HEME/LYMPH: No easy bruising, or bleeding gums  All others negative    MEDICATIONS:  aspirin  chewable 81 milliGRAM(s) Oral daily  furosemide    Tablet 20 milliGRAM(s) Oral two times a day  heparin  Infusion 800 Unit(s)/Hr IV Continuous <Continuous>  losartan 100 milliGRAM(s) Oral daily  metolazone 2.5 milliGRAM(s) Oral <User Schedule>  metoprolol succinate ER 25 milliGRAM(s) Oral daily  tamsulosin 0.8 milliGRAM(s) Oral at bedtime      albuterol/ipratropium for Nebulization 3 milliLiter(s) Nebulizer every 6 hours  buDESOnide    Inhalation Suspension 0.5 milliGRAM(s) Inhalation every 12 hours  montelukast 10 milliGRAM(s) Oral daily    acetaminophen     Tablet .. 650 milliGRAM(s) Oral every 6 hours PRN  gabapentin 100 milliGRAM(s) Oral every 12 hours  traMADol 50 milliGRAM(s) Oral every 12 hours PRN    pantoprazole    Tablet 40 milliGRAM(s) Oral before breakfast    atorvastatin 40 milliGRAM(s) Oral at bedtime  dextrose 50% Injectable 25 Gram(s) IV Push once  dextrose 50% Injectable 12.5 Gram(s) IV Push once  dextrose 50% Injectable 25 Gram(s) IV Push once  dextrose 50% Injectable 25 Gram(s) IV Push once  dextrose Oral Gel 15 Gram(s) Oral once PRN  dextrose Oral Gel 15 Gram(s) Oral once PRN  finasteride 5 milliGRAM(s) Oral daily  glucagon  Injectable 1 milliGRAM(s) IntraMuscular once  glucagon  Injectable 1 milliGRAM(s) IntraMuscular once  insulin glargine Injectable (LANTUS) 30 Unit(s) SubCutaneous at bedtime  insulin lispro (ADMELOG) corrective regimen sliding scale   SubCutaneous three times a day before meals  levothyroxine 25 MICROGram(s) Oral daily    dextrose 5%. 1000 milliLiter(s) IV Continuous <Continuous>  dextrose 5%. 1000 milliLiter(s) IV Continuous <Continuous>  multivitamin/minerals 1 Tablet(s) Oral daily      FAMILY HISTORY:        Allergies    No Known Allergies    Intolerances    	      PHYSICAL EXAM:  T(C): 36.4 (04-02-22 @ 13:23), Max: 36.7 (04-01-22 @ 16:50)  HR: 88 (04-02-22 @ 13:23) (81 - 95)  BP: 120/64 (04-02-22 @ 13:23) (116/56 - 160/68)  RR: 18 (04-02-22 @ 13:23) (18 - 18)  SpO2: 92% (04-02-22 @ 13:23) (92% - 96%)  Wt(kg): --  I&O's Summary    01 Apr 2022 07:01  -  02 Apr 2022 07:00  --------------------------------------------------------  IN: 540 mL / OUT: 800 mL / NET: -260 mL    02 Apr 2022 07:01  -  02 Apr 2022 14:57  --------------------------------------------------------  IN: 240 mL / OUT: 300 mL / NET: -60 mL        GENERAL: NAD   EYES: EOMI, PERRLA, conjunctiva and sclera clear  ENMT: No tonsillar erythema, exudates, or enlargement; Moist mucous membranes, Good dentition, No lesions  Cardiovascular: Normal S1 S2, No JVD, No murmurs, No edema  Respiratory: mild b/l scattered wheezing   Gastrointestinal:  Soft, Non-tender, + BS	  Extremities: right foot wound covered       LABS:	 	    CARDIAC MARKERS:                                  7.9    7.27  )-----------( 246      ( 02 Apr 2022 08:22 )             26.7     04-02    146<H>  |  104  |  41<H>  ----------------------------<  105<H>  3.4<L>   |  29  |  1.33<H>    Ca    9.3      02 Apr 2022 08:12  Phos  3.1     04-02  Mg     2.0     04-02      proBNP:   Lipid Profile:   HgA1c:   TSH:     Consultant(s) Notes Reviewed:  [x ] YES  [ ] NO    Care Discussed with Consultants/Other Providers [ x] YES  [ ] NO    Imaging Personally Reviewed independently:  [x] YES  [ ] NO    All labs, radiologic studies, vitals, orders and medications list reviewed. Patient is seen and examined at bedside. Case discussed with medical team.    ASSESSMENT/PLAN:

## 2022-04-02 NOTE — CONSULT NOTE ADULT - SUBJECTIVE AND OBJECTIVE BOX
[facility] - [facility] / Ricci S /D Nora/ S Antonia/ S Suhail/ Jeremi Lee / KENDALL Haynesu/ O Antwon  service -[phone], office [phone]  ---------------------------------------------------------------------------------------------------------------    Patient is a 86y Male whom presented to the hospital with   Denied recent NSAID use/Abx use/iv contrast studies.    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    Deep Vein Thrombosis (DVT)  17 yeras ago on Coumadin    CHF (congestive heart failure)  last exacerbation in 1/2017    Enlarged prostate    GERD (gastroesophageal reflux disease)    Hernia  umblical    Calculus of bile duct without cholangitis or cholecystitis without obstruction    Atrial fibrillation    S/P Hernia Repair    S/P cataract surgery, unspecified laterality    S/P ERCP  3/2017        Allergies: No Known Allergies    Home Medications Reviewed  Hospital Medications:   MEDICATIONS  (STANDING):  albuterol/ipratropium for Nebulization 3 milliLiter(s) Nebulizer every 6 hours  aspirin  chewable 81 milliGRAM(s) Oral daily  atorvastatin 40 milliGRAM(s) Oral at bedtime  buDESOnide    Inhalation Suspension 0.5 milliGRAM(s) Inhalation every 12 hours  dextrose 5%. 1000 milliLiter(s) (100 mL/Hr) IV Continuous <Continuous>  dextrose 5%. 1000 milliLiter(s) (50 mL/Hr) IV Continuous <Continuous>  dextrose 50% Injectable 25 Gram(s) IV Push once  dextrose 50% Injectable 12.5 Gram(s) IV Push once  dextrose 50% Injectable 25 Gram(s) IV Push once  dextrose 50% Injectable 25 Gram(s) IV Push once  finasteride 5 milliGRAM(s) Oral daily  furosemide    Tablet 20 milliGRAM(s) Oral two times a day  gabapentin 100 milliGRAM(s) Oral every 12 hours  glucagon  Injectable 1 milliGRAM(s) IntraMuscular once  glucagon  Injectable 1 milliGRAM(s) IntraMuscular once  heparin  Infusion 800 Unit(s)/Hr (10 mL/Hr) IV Continuous <Continuous>  insulin glargine Injectable (LANTUS) 30 Unit(s) SubCutaneous at bedtime  insulin lispro (ADMELOG) corrective regimen sliding scale   SubCutaneous three times a day before meals  levothyroxine 25 MICROGram(s) Oral daily  losartan 100 milliGRAM(s) Oral daily  metolazone 2.5 milliGRAM(s) Oral <User Schedule>  metoprolol succinate ER 25 milliGRAM(s) Oral daily  montelukast 10 milliGRAM(s) Oral daily  multivitamin/minerals 1 Tablet(s) Oral daily  pantoprazole    Tablet 40 milliGRAM(s) Oral before breakfast  tamsulosin 0.8 milliGRAM(s) Oral at bedtime    SOCIAL HISTORY:  Denies ETOh,Smoking, illicit drug use  FAMILY HISTORY:      REVIEW OF SYSTEMS:  CONSTITUTIONAL: No weakness, fevers or chills  EYES/ENT: No visual changes;  No vertigo or throat pain   NECK: No pain or stiffness  RESPIRATORY: No cough, wheezing, hemoptysis; No shortness of breath  CARDIOVASCULAR: No chest pain or palpitations.  GASTROINTESTINAL: No abdominal or epigastric pain. No nausea, vomiting, or hematemesis; No diarrhea or constipation. No melena or hematochezia.  GENITOURINARY: No dysuria, frequency, foamy urine, urinary urgency, incontinence or hematuria  NEUROLOGICAL: No numbness or weakness  SKIN: No itching, burning, rashes, or lesions   VASCULAR: No bilateral lower extremity edema.   All other review of systems is negative unless indicated above.    VITALS:  T(F): 97.8 (04-02-22 @ 09:56), Max: 98 (04-01-22 @ 16:50)  HR: 81 (04-02-22 @ 09:56)  BP: 116/56 (04-02-22 @ 09:56)  RR: 18 (04-02-22 @ 09:56)  SpO2: 92% (04-02-22 @ 09:56)  Wt(kg): --    04-01 @ 07:01  -  04-02 @ 07:00  --------------------------------------------------------  IN: 540 mL / OUT: 800 mL / NET: -260 mL    04-02 @ 07:01  -  04-02 @ 12:14  --------------------------------------------------------  IN: 240 mL / OUT: 0 mL / NET: 240 mL      Height (cm): 172.7 (04-01 @ 18:22)  Weight (kg): 92.4 (04-01 @ 18:22)  BMI (kg/m2): 31 (04-01 @ 18:22)  BSA (m2): 2.06 (04-01 @ 18:22)    PHYSICAL EXAM:  Constitutional: NAD  HEENT: anicteric sclera, oropharynx clear, MMM  Neck: No JVD  Respiratory: CTAB, no wheezes, rales or rhonchi  Cardiovascular: S1, S2, RRR  Gastrointestinal: BS+, soft, NT/ND  Extremities: No cyanosis or clubbing. No peripheral edema  Neurological: A/O x 3, no focal deficits  Psychiatric: Normal mood, normal affect  : No CVA tenderness. No hudson.   Skin: No rashes  Vascular Access:    LABS:  04-02    146<H>  |  104  |  41<H>  ----------------------------<  105<H>  3.4<L>   |  29  |  1.33<H>    Ca    9.3      02 Apr 2022 08:12  Phos  3.1     04-02  Mg     2.0     04-02      Creatinine Trend: 1.33 <--                        7.9    7.27  )-----------( 246      ( 02 Apr 2022 08:22 )             26.7     Urine Studies:        RADIOLOGY & ADDITIONAL STUDIES:                 [facility] - [facility] / Ricci S /D Nora/ S Antonia/ S Suhail/ Jeremi Lee / KENDALL Haynesu/ O Antwon  service -[phone], office [phone]  ---------------------------------------------------------------------------------------------------------------  Patient seen and examined bedside    86 year old gentleman with a PMH DM, HTN, HLD, w/ foot wounds and leg pain. He underwent and angiogram with Dr. Quinn in september of 2021 and has been followed in the office. He reports having pain in the RLE mostly in the toes and has open wounds with some purulence and gangrene. The pain is worse at night when lying in bed, and improves with tylenol and dangling the foot off the bed. He is non-ambulatory at home.  He presented 4/1 from the office for preoperative planning and optimization for a RLE AKA on Monday. Renal consulted for CKD Mx. Pt known to our gp from last hospitalization. has known h/o CKD. currently denies sob/cp/urinary sxs/V/D.     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    Deep Vein Thrombosis (DVT)  17 yeras ago on Coumadin    CHF (congestive heart failure)  last exacerbation in 1/2017    Enlarged prostate    GERD (gastroesophageal reflux disease)    Hernia  umblical    Calculus of bile duct without cholangitis or cholecystitis without obstruction    Atrial fibrillation    S/P Hernia Repair    S/P cataract surgery, unspecified laterality    S/P ERCP  3/2017    Allergies: No Known Allergies    Home Medications Reviewed  Hospital Medications:   MEDICATIONS  (STANDING):  albuterol/ipratropium for Nebulization 3 milliLiter(s) Nebulizer every 6 hours  aspirin  chewable 81 milliGRAM(s) Oral daily  atorvastatin 40 milliGRAM(s) Oral at bedtime  buDESOnide    Inhalation Suspension 0.5 milliGRAM(s) Inhalation every 12 hours  dextrose 5%. 1000 milliLiter(s) (100 mL/Hr) IV Continuous <Continuous>  dextrose 5%. 1000 milliLiter(s) (50 mL/Hr) IV Continuous <Continuous>  dextrose 50% Injectable 25 Gram(s) IV Push once  dextrose 50% Injectable 12.5 Gram(s) IV Push once  dextrose 50% Injectable 25 Gram(s) IV Push once  dextrose 50% Injectable 25 Gram(s) IV Push once  finasteride 5 milliGRAM(s) Oral daily  furosemide    Tablet 20 milliGRAM(s) Oral two times a day  gabapentin 100 milliGRAM(s) Oral every 12 hours  glucagon  Injectable 1 milliGRAM(s) IntraMuscular once  glucagon  Injectable 1 milliGRAM(s) IntraMuscular once  heparin  Infusion 800 Unit(s)/Hr (10 mL/Hr) IV Continuous <Continuous>  insulin glargine Injectable (LANTUS) 30 Unit(s) SubCutaneous at bedtime  insulin lispro (ADMELOG) corrective regimen sliding scale   SubCutaneous three times a day before meals  levothyroxine 25 MICROGram(s) Oral daily  losartan 100 milliGRAM(s) Oral daily  metolazone 2.5 milliGRAM(s) Oral <User Schedule>  metoprolol succinate ER 25 milliGRAM(s) Oral daily  montelukast 10 milliGRAM(s) Oral daily  multivitamin/minerals 1 Tablet(s) Oral daily  pantoprazole    Tablet 40 milliGRAM(s) Oral before breakfast  tamsulosin 0.8 milliGRAM(s) Oral at bedtime    SOCIAL HISTORY:  Denies ETOh,Smoking, illicit drug use  FAMILY HISTORY:    REVIEW OF SYSTEMS:  CONSTITUTIONAL: No weakness, fevers or chills  EYES/ENT: No visual changes;  No vertigo or throat pain   NECK: No pain or stiffness  RESPIRATORY: No cough, wheezing, hemoptysis; No shortness of breath  CARDIOVASCULAR: No chest pain or palpitations.  GASTROINTESTINAL: No abdominal or epigastric pain. No nausea, vomiting, or hematemesis; No diarrhea or constipation. No melena or hematochezia.  GENITOURINARY: No dysuria, frequency, foamy urine, urinary urgency, incontinence or hematuria  NEUROLOGICAL: No numbness or weakness  VASCULAR: + bilateral lower extremity edema.   All other review of systems is negative unless indicated above.    VITALS:  T(F): 97.8 (04-02-22 @ 09:56), Max: 98 (04-01-22 @ 16:50)  HR: 81 (04-02-22 @ 09:56)  BP: 116/56 (04-02-22 @ 09:56)  RR: 18 (04-02-22 @ 09:56)  SpO2: 92% (04-02-22 @ 09:56)  Wt(kg): --    04-01 @ 07:01  -  04-02 @ 07:00  --------------------------------------------------------  IN: 540 mL / OUT: 800 mL / NET: -260 mL    04-02 @ 07:01  -  04-02 @ 12:14  --------------------------------------------------------  IN: 240 mL / OUT: 0 mL / NET: 240 mL      Height (cm): 172.7 (04-01 @ 18:22)  Weight (kg): 92.4 (04-01 @ 18:22)  BMI (kg/m2): 31 (04-01 @ 18:22)  BSA (m2): 2.06 (04-01 @ 18:22)    PHYSICAL EXAM:  Constitutional: NAD  HEENT: anicteric sclera  Neck: No JVD  Respiratory: CTAB, no wheezes, rales or rhonchi  Cardiovascular: S1, S2, RRR  Gastrointestinal: BS+, soft, NT/ND  Extremities: 3+ peripheral edema b/l; Rt foot dressing+   Neurological: A/O x 3, no focal deficits  Psychiatric: Normal mood, normal affect  : No hudson.       LABS:  04-02    146<H>  |  104  |  41<H>  ----------------------------<  105<H>  3.4<L>   |  29  |  1.33<H>    Ca    9.3      02 Apr 2022 08:12  Phos  3.1     04-02  Mg     2.0     04-02      Creatinine Trend: 1.33 <--                        7.9    7.27  )-----------( 246      ( 02 Apr 2022 08:22 )             26.7     Urine Studies:    RADIOLOGY & ADDITIONAL STUDIES:

## 2022-04-02 NOTE — CONSULT NOTE ADULT - SUBJECTIVE AND OBJECTIVE BOX
Patient is a 86y old  Male who presents with a chief complaint of Preoperative Planning for Right above knee amputation (02 Apr 2022 15:43)      HPI:  CC: Sent in from office for right above knee amputation    HPI: Mr. Heart is an 86 year old gentleman with a PMH DM, HTN, HLD who has been followed for the past 6 months for foot wounds and leg pain. He underwent and angiogram with Dr. Quinn in september of 2021 and has been followed in the office. He reports having pain in the RLE mostly in the toes and has open wounds with some purulence and gangrene. The pain is worse at night when lying in bed, and improves with tylenol and dangling the foot off the bed. He is non-ambulatory at home.  He presents today from the office for preoperative planning and optimization for a RLE AKA on Monday.       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    Deep Vein Thrombosis (DVT)  17 yeras ago on Coumadin    CHF (congestive heart failure)  last exacerbation in 1/2017    Enlarged prostate    GERD (gastroesophageal reflux disease)    Hernia  umblical    Calculus of bile duct without cholangitis or cholecystitis without obstruction    Atrial fibrillation    S/P Hernia Repair    S/P cataract surgery, unspecified laterality    S/P ERCP  3/2017        Review of Systems:   CONSTITUTIONAL: No fever,  or fatigue  RESPIRATORY: No cough,  No shortness of breath  CARDIOVASCULAR: No chest pain, palpitations, dizziness, or leg swelling  GASTROINTESTINAL: No abdominal  pain. No nausea, vomiting.            Allergies    No Known Allergies    Intolerances        Social History:     FAMILY HISTORY:      MEDICATIONS  (STANDING):  albuterol/ipratropium for Nebulization 3 milliLiter(s) Nebulizer every 6 hours  aspirin  chewable 81 milliGRAM(s) Oral daily  atorvastatin 40 milliGRAM(s) Oral at bedtime  buDESOnide    Inhalation Suspension 0.5 milliGRAM(s) Inhalation every 12 hours  dextrose 5%. 1000 milliLiter(s) (100 mL/Hr) IV Continuous <Continuous>  dextrose 5%. 1000 milliLiter(s) (50 mL/Hr) IV Continuous <Continuous>  dextrose 50% Injectable 25 Gram(s) IV Push once  dextrose 50% Injectable 12.5 Gram(s) IV Push once  dextrose 50% Injectable 25 Gram(s) IV Push once  dextrose 50% Injectable 25 Gram(s) IV Push once  finasteride 5 milliGRAM(s) Oral daily  furosemide    Tablet 20 milliGRAM(s) Oral two times a day  gabapentin 100 milliGRAM(s) Oral every 12 hours  glucagon  Injectable 1 milliGRAM(s) IntraMuscular once  glucagon  Injectable 1 milliGRAM(s) IntraMuscular once  heparin  Infusion 800 Unit(s)/Hr (10 mL/Hr) IV Continuous <Continuous>  insulin glargine Injectable (LANTUS) 30 Unit(s) SubCutaneous at bedtime  insulin lispro (ADMELOG) corrective regimen sliding scale   SubCutaneous three times a day before meals  levothyroxine 25 MICROGram(s) Oral daily  losartan 100 milliGRAM(s) Oral daily  metolazone 2.5 milliGRAM(s) Oral <User Schedule>  metoprolol succinate ER 25 milliGRAM(s) Oral daily  montelukast 10 milliGRAM(s) Oral daily  multivitamin/minerals 1 Tablet(s) Oral daily  pantoprazole    Tablet 40 milliGRAM(s) Oral before breakfast  tamsulosin 0.8 milliGRAM(s) Oral at bedtime    MEDICATIONS  (PRN):  acetaminophen     Tablet .. 650 milliGRAM(s) Oral every 6 hours PRN Mild Pain (1 - 3), Moderate Pain (4 - 6)  dextrose Oral Gel 15 Gram(s) Oral once PRN Blood Glucose LESS THAN 70 milliGRAM(s)/deciliter  dextrose Oral Gel 15 Gram(s) Oral once PRN Blood Glucose LESS THAN 70 milliGRAM(s)/deciliter  traMADol 50 milliGRAM(s) Oral every 12 hours PRN Moderate Pain (4 - 6)      CAPILLARY BLOOD GLUCOSE      POCT Blood Glucose.: 220 mg/dL (02 Apr 2022 14:46)  POCT Blood Glucose.: 110 mg/dL (02 Apr 2022 08:30)  POCT Blood Glucose.: 170 mg/dL (01 Apr 2022 21:38)  POCT Blood Glucose.: 136 mg/dL (01 Apr 2022 18:39)    I&O's Summary    01 Apr 2022 07:01  -  02 Apr 2022 07:00  --------------------------------------------------------  IN: 540 mL / OUT: 800 mL / NET: -260 mL    02 Apr 2022 07:01  -  02 Apr 2022 16:52  --------------------------------------------------------  IN: 240 mL / OUT: 300 mL / NET: -60 mL        T(C): 36.4 (04-02-22 @ 13:23), Max: 36.7 (04-02-22 @ 05:27)  HR: 88 (04-02-22 @ 13:23) (81 - 95)  BP: 120/64 (04-02-22 @ 13:23) (116/56 - 160/68)  RR: 18 (04-02-22 @ 13:23) (18 - 18)  SpO2: 92% (04-02-22 @ 13:23) (92% - 94%)    PHYSICAL EXAM:      NECK: Supple, No JVD  CHEST/LUNG: Clear to auscultation bilaterally; No wheezing.  HEART: Regular rate and rhythm; No murmurs, rubs, or gallops  ABDOMEN: Soft, Nontender, Nondistended; Bowel sounds present  EXTREMITIES: RLE wound  NEUROLOGY: AAO      LABS:                        7.9    7.27  )-----------( 246      ( 02 Apr 2022 08:22 )             26.7     04-02    146<H>  |  104  |  41<H>  ----------------------------<  105<H>  3.4<L>   |  29  |  1.33<H>    Ca    9.3      02 Apr 2022 08:12  Phos  3.1     04-02  Mg     2.0     04-02      PT/INR - ( 01 Apr 2022 18:23 )   PT: 24.0 sec;   INR: 2.05 ratio         PTT - ( 02 Apr 2022 08:12 )  PTT:52.1 sec        CAPILLARY BLOOD GLUCOSE      POCT Blood Glucose.: 220 mg/dL (02 Apr 2022 14:46)  POCT Blood Glucose.: 110 mg/dL (02 Apr 2022 08:30)  POCT Blood Glucose.: 170 mg/dL (01 Apr 2022 21:38)  POCT Blood Glucose.: 136 mg/dL (01 Apr 2022 18:39)        RADIOLOGY & ADDITIONAL TESTS:    Imaging Personally Reviewed:    Consultant(s) Notes Reviewed:      Care Discussed with Consultants/Other Providers:    Thanks for consult. Will follow.

## 2022-04-02 NOTE — PROGRESS NOTE ADULT - ASSESSMENT
Assessment: 86 year old man with HTN HLD DM and nonhealing wounds of RLE who is non-ambulatory at home presents for preoperative assessment and right above knee amputation.     Plan:  - Resume home meds  - Diabetic diet   - Holding coumadin, hep gtt  - Cardiology consult for perioperative risk assessment  - Medicine consult for perioperative risk assessment  - Plan for OR Monday 4/4 for right above knee amputation with Dr. Quinn      Vascular Surgery   [id number] Assessment: 86 year old man with HTN HLD DM and nonhealing wounds of RLE who is non-ambulatory at home presents for preoperative assessment and right BELOW knee amputation.     Plan:  - Resume home meds  - Diabetic diet   - Holding coumadin, hep gtt  - Cardiology consult for perioperative risk assessment, TTE and EKG  - Medicine consult for perioperative risk assessment  - Pulm consult for perioperative risk assessment  - Plan for OR Monday 4/4 for right BELOW knee amputation with Dr. Quinn      Vascular Surgery   [id number]

## 2022-04-02 NOTE — CONSULT NOTE ADULT - ASSESSMENT
86M with PMH of COPD (not on home o2), prior smoking history (40 pack years), HTN, HLD, Afib, CHF, T2DM, CVA, BPH, and PAD admitted for elective RLE AKA. Renal consulted for CKD Mx.    CKD 3, stable  baseline Cr ~1.7  k low  Hypervolemic clinically    c/w losartan 100mg daily  c/w lasix 20mg bid po w/metolazone  monitor BMP daily and u/o   dose all meds for eGFR  avoid NSAIDs/Nephrotoxics.    Hypokalemia -magnesium adequate. s/p po kcl 20meq x1  HTN, controlled. bp optimal   continue ARB, lasix, BB. monitor bp    Rt LE nonhealing wound:  Vascular/Cardio f/up noted.  ECHO  For Rt AKA Monday  optimized renal stand point for OR. keep K ~4.0    A Fib: on IV Heparin  Cw Metoprolol. f/u w/cardio    poc d/w pt  Thanks for consulting. will closely follow up.   labs, rad, chart reviewed  For any question, call:  Cell # [phone]  Pager # [phone]

## 2022-04-02 NOTE — CONSULT NOTE ADULT - ASSESSMENT
Patient is a 86y old  Male who presents with a chief complaint of Preoperative Planning for Right above knee amputation.    Rt LE nonhealing wound:    Vascular/Cardio  Patient is a 86y old  Male who presents with a chief complaint of Preoperative Planning for Right above knee amputation.    Rt LE nonhealing wound:    Vascular/Cardio f/up noted.  ECHO  For Rt AKA    A Fib:    IV Heparin  Cw Metoprolol    COPD:    Cw Duoneb  Pulmicort    Considering pt's medical condition and nature of Sx, Pt is at intermediate risk for the surgery pending ECHO.

## 2022-04-03 NOTE — PROGRESS NOTE ADULT - SUBJECTIVE AND OBJECTIVE BOX
Surgery Progress Note     Subjective/24hour Events: No acute events overnight, seen and examined at bedside this am.     Vital Signs Last 24 Hrs  T(C): 36.8 (02 Apr 2022 21:41), Max: 36.8 (02 Apr 2022 21:41)  T(F): 98.2 (02 Apr 2022 21:41), Max: 98.2 (02 Apr 2022 21:41)  HR: 78 (02 Apr 2022 21:41) (74 - 93)  BP: 134/58 (02 Apr 2022 21:41) (116/56 - 148/68)  BP(mean): --  RR: 18 (02 Apr 2022 21:41) (18 - 18)  SpO2: 92% (02 Apr 2022 21:41) (92% - 93%)    I&O's Detail    01 Apr 2022 07:01  -  02 Apr 2022 07:00  --------------------------------------------------------  IN:    Oral Fluid: 540 mL  Total IN: 540 mL    OUT:    Voided (mL): 800 mL  Total OUT: 800 mL    Total NET: -260 mL      02 Apr 2022 07:01  -  03 Apr 2022 01:15  --------------------------------------------------------  IN:    Oral Fluid: 480 mL  Total IN: 480 mL    OUT:    Voided (mL): 300 mL  Total OUT: 300 mL    Total NET: 180 mL    Physical Exam: Gen: NAD pleasant and conversant  HEENT: NC/AT EOMI  Neck: supple, FROM  Chest: symmetric chest rise, non-agonal on Room air  Abd: Soft, nontender, nondistended  Vascular: Unable to appreciate RLE pulses, 1+LLE pulses, pitting edema to knees bilaterally    Labs:                        7.9    7.27  )-----------( 246      ( 02 Apr 2022 08:22 )             26.7   04-02    146<H>  |  104  |  41<H>  ----------------------------<  105<H>  3.4<L>   |  29  |  1.33<H>    Ca    9.3      02 Apr 2022 08:12  Phos  3.1     04-02  Mg     2.0     04-02      PT/INR - ( 01 Apr 2022 18:23 )   PT: 24.0 sec;   INR: 2.05 ratio         PTT - ( 02 Apr 2022 17:47 )  PTT:61.2 sec

## 2022-04-03 NOTE — PROGRESS NOTE ADULT - ASSESSMENT
86M with PMH of COPD (not on home o2), prior smoking history (40 pack years), HTN, HLD, Afib, CHF, T2DM, CVA, BPH, and PAD admitted for elective RLE AKA. Pulmonary called for preoperative evaluation.    Recommendations  -Not in acute copd exacerbation. Doesn't appear to have any acute pulmonary process at this time.   -CXR with linear atelectasis. Continue incentive spirometer pre and post-op.   -ABG suggestive of metabolic alkalosis. Favor holding lasix for now.   -C/w home montelukast and budesonide  -CW duonebs q6 standing.   -AKA planned for 4/4.   -Patient overall low risk for post operative pulmonary compilations including respiratory failure, infection, pleural effusion, atelectasis, ptx, and bronchospasm. Although the patient is at elevated pulmonary risk, there is no absolute pulmonary contraindication to proceed.   -opioid-sparing analgesic techniques, and avoidance of concurrent administration of sedatives.  -a restrictive or goal-directed strategy for perioperative fluid therapy using fluids with relatively lower salt content (ie, Ringer's Lactate or Plasmalyte rather than normal saline) is preferred to avoid rostral fluid shifts in the neck.    -Extubation to BIPAP  -Full perioperative tele monitoring including end tidal co2.  -Routine anesthesia input into the case is appreciated.  -Pulmonary to sign off. Please reconsult if needed.     Dr. Jack Bertrand, DO  Pulmonary and Critical Care Medicine Fellow   Available via [employer] Teams - **Preferred**  [facility] #[id number] ([facility]) / [phone] ([facility])  Pager:  [phone]

## 2022-04-03 NOTE — PROGRESS NOTE ADULT - SUBJECTIVE AND OBJECTIVE BOX
PULMONARY SERVICE FOLLOW UP CONSULT NOTE    SUBJECTIVE:  Denies chest pain, dyspnea, cough, or wheezing.  No acute complaints.     REVIEW OF SYSTEMS:  All additional ROS negative.    MEDICATIONS:  Pulmonary:  albuterol/ipratropium for Nebulization 3 milliLiter(s) Nebulizer every 6 hours  buDESOnide    Inhalation Suspension 0.5 milliGRAM(s) Inhalation every 12 hours  montelukast 10 milliGRAM(s) Oral daily    Antimicrobials:    Anticoagulants:  aspirin  chewable 81 milliGRAM(s) Oral daily  heparin  Infusion 800 Unit(s)/Hr IV Continuous <Continuous>    Onc:    GI/:  pantoprazole    Tablet 40 milliGRAM(s) Oral before breakfast    Endocrine:  atorvastatin 40 milliGRAM(s) Oral at bedtime  dextrose 50% Injectable 25 Gram(s) IV Push once  dextrose 50% Injectable 12.5 Gram(s) IV Push once  dextrose 50% Injectable 25 Gram(s) IV Push once  dextrose 50% Injectable 25 Gram(s) IV Push once  dextrose Oral Gel 15 Gram(s) Oral once PRN  dextrose Oral Gel 15 Gram(s) Oral once PRN  finasteride 5 milliGRAM(s) Oral daily  glucagon  Injectable 1 milliGRAM(s) IntraMuscular once  glucagon  Injectable 1 milliGRAM(s) IntraMuscular once  insulin glargine Injectable (LANTUS) 30 Unit(s) SubCutaneous at bedtime  insulin lispro (ADMELOG) corrective regimen sliding scale   SubCutaneous three times a day before meals  levothyroxine 25 MICROGram(s) Oral daily    Cardiac:  furosemide    Tablet 20 milliGRAM(s) Oral two times a day  losartan 100 milliGRAM(s) Oral daily  metolazone 2.5 milliGRAM(s) Oral <User Schedule>  metoprolol succinate ER 25 milliGRAM(s) Oral daily  tamsulosin 0.8 milliGRAM(s) Oral at bedtime    Other Medications:  acetaminophen     Tablet .. 650 milliGRAM(s) Oral every 6 hours PRN  dextrose 5%. 1000 milliLiter(s) IV Continuous <Continuous>  dextrose 5%. 1000 milliLiter(s) IV Continuous <Continuous>  gabapentin 100 milliGRAM(s) Oral every 12 hours  multivitamin/minerals 1 Tablet(s) Oral daily  potassium chloride    Tablet ER 20 milliEquivalent(s) Oral once  traMADol 50 milliGRAM(s) Oral every 12 hours PRN      PHYSICAL EXAM  Vital Signs Last 24 Hrs  T(C): 36.4 (03 Apr 2022 14:14), Max: 37.1 (03 Apr 2022 01:24)  T(F): 97.5 (03 Apr 2022 14:14), Max: 98.8 (03 Apr 2022 01:24)  HR: 80 (03 Apr 2022 14:14) (74 - 98)  BP: 137/62 (03 Apr 2022 14:14) (115/70 - 149/76)  BP(mean): --  RR: 18 (03 Apr 2022 14:14) (16 - 18)  SpO2: 92% (03 Apr 2022 14:14) (92% - 98%)    04-02 @ 07:01  -  04-03 @ 07:00  --------------------------------------------------------  IN: 860 mL / OUT: 300 mL / NET: 560 mL    04-03 @ 07:01 - 04-03 @ 15:27  --------------------------------------------------------  IN: 549 mL / OUT: 400 mL / NET: 149 mL          CONSTITUTIONAL: No acute distress.   HEENT:  Conjunctiva clear B/L.  Moist oral mucosa.   Cardiovascular: RRR with no murmurs. No JVD noted. 2+ Lower extremity edema B/L. Extremities are warm and well perfused.    Respiratory: Dec bs at bases. Moving air well b/l. No wheezing or rales or rhonchi. No accessory muscle use.   Gastrointestinal:  Soft, nontender. Non-distended. Non-rigid.    Neurologic:  Alert and awake. Moving all extremities. Following commands.      LABS:  ABG - ( 03 Apr 2022 11:22 )  pH, Arterial: 7.51  pH, Blood: x     /  pCO2: 41    /  pO2: 71    / HCO3: 33    / Base Excess: 8.8   /  SaO2: 97.1                CBC Full  -  ( 03 Apr 2022 07:53 )  WBC Count : 7.62 K/uL  RBC Count : 3.61 M/uL  Hemoglobin : 8.7 g/dL  Hematocrit : 30.2 %  Platelet Count - Automated : 265 K/uL  Mean Cell Volume : 83.7 fl  Mean Cell Hemoglobin : 24.1 pg  Mean Cell Hemoglobin Concentration : 28.8 gm/dL  Auto Neutrophil # : x  Auto Lymphocyte # : x  Auto Monocyte # : x  Auto Eosinophil # : x  Auto Basophil # : x  Auto Neutrophil % : x  Auto Lymphocyte % : x  Auto Monocyte % : x  Auto Eosinophil % : x  Auto Basophil % : x    04-03    146<H>  |  104  |  39<H>  ----------------------------<  96  3.5   |  32<H>  |  1.48<H>    Ca    9.2      03 Apr 2022 07:53  Phos  3.0     04-03  Mg     2.1     04-03      PT/INR - ( 01 Apr 2022 18:23 )   PT: 24.0 sec;   INR: 2.05 ratio         PTT - ( 03 Apr 2022 07:53 )  PTT:191.3 sec                  RADIOLOGY & ADDITIONAL STUDIES:

## 2022-04-03 NOTE — PROGRESS NOTE ADULT - ASSESSMENT
Assessment: 86 year old man with HTN HLD DM and nonhealing wounds of RLE who is non-ambulatory at home presents for preoperative assessment and right BELOW knee amputation.     Plan:  - c/w home meds  - Diabetic diet   - Holding coumadin, hep gtt, f/u ptt  - Cardiology consult for perioperative risk assessment, TTE and EKG  - Medicine consult for perioperative risk assessment, recs appreciated   - Pulm consult for perioperative risk assessment, CXR and ABG   - Plan for OR Monday 4/4 for right BELOW knee amputation with Dr. Quinn    Vascular Surgery   [id number] Assessment: 86 year old man with HTN HLD DM and nonhealing wounds of RLE who is non-ambulatory at home presents for preoperative assessment and right BELOW knee amputation.     Plan:  - c/w home meds  - Diabetic diet   - Holding coumadin, hep gtt, f/u ptt  - f/u Cardiology consult for perioperative risk assessment, TTE pending and EKG done  - Medicine consult for perioperative risk assessment, recs appreciated   - f/u Pulm consult for perioperative risk assessment, CXR and ABG done  - Plan for OR Monday 4/4 for right BELOW knee amputation with Dr. Quinn    Vascular Surgery   [id number]

## 2022-04-03 NOTE — PROGRESS NOTE ADULT - ATTENDING COMMENTS
I have examined pt and agree with above exam and plan above.    85 yo male with clinically mild COPD for AKA. CXR is without infiltrates or hyperinflation. ABG without hypercapnia. Pt with metabolic alkalosis. Agree with holding diuretics.  Pt is a low risk for  perioperative pulmonary complications such as pneumonia or prolonged mechanical ventilation.  Continue inhaled steroids and bronchodilators.

## 2022-04-03 NOTE — PROGRESS NOTE ADULT - SUBJECTIVE AND OBJECTIVE BOX
Patient is a 86y old  Male who presents with a chief complaint of Preoperative Planning for Right above knee amputation (03 Apr 2022 15:25)      SUBJECTIVE / OVERNIGHT EVENTS:    Events noted.  CONSTITUTIONAL: No fever,  or fatigue  RESPIRATORY: No cough, wheezing,  No shortness of breath  CARDIOVASCULAR: No chest pain, palpitations, dizziness, or leg swelling  GASTROINTESTINAL: No abdominal or epigastric pain. No nausea, vomiting.  NEUROLOGICAL: No headache    MEDICATIONS  (STANDING):  albuterol/ipratropium for Nebulization 3 milliLiter(s) Nebulizer every 6 hours  aspirin  chewable 81 milliGRAM(s) Oral daily  atorvastatin 40 milliGRAM(s) Oral at bedtime  buDESOnide    Inhalation Suspension 0.5 milliGRAM(s) Inhalation every 12 hours  dextrose 5%. 1000 milliLiter(s) (100 mL/Hr) IV Continuous <Continuous>  dextrose 5%. 1000 milliLiter(s) (50 mL/Hr) IV Continuous <Continuous>  dextrose 50% Injectable 25 Gram(s) IV Push once  dextrose 50% Injectable 12.5 Gram(s) IV Push once  dextrose 50% Injectable 25 Gram(s) IV Push once  dextrose 50% Injectable 25 Gram(s) IV Push once  finasteride 5 milliGRAM(s) Oral daily  gabapentin 100 milliGRAM(s) Oral every 12 hours  glucagon  Injectable 1 milliGRAM(s) IntraMuscular once  glucagon  Injectable 1 milliGRAM(s) IntraMuscular once  heparin  Infusion 800 Unit(s)/Hr (11 mL/Hr) IV Continuous <Continuous>  insulin glargine Injectable (LANTUS) 30 Unit(s) SubCutaneous at bedtime  insulin lispro (ADMELOG) corrective regimen sliding scale   SubCutaneous three times a day before meals  levothyroxine 25 MICROGram(s) Oral daily  losartan 100 milliGRAM(s) Oral daily  metolazone 2.5 milliGRAM(s) Oral <User Schedule>  metoprolol succinate ER 25 milliGRAM(s) Oral daily  montelukast 10 milliGRAM(s) Oral daily  multivitamin/minerals 1 Tablet(s) Oral daily  pantoprazole    Tablet 40 milliGRAM(s) Oral before breakfast  tamsulosin 0.8 milliGRAM(s) Oral at bedtime    MEDICATIONS  (PRN):  acetaminophen     Tablet .. 650 milliGRAM(s) Oral every 6 hours PRN Mild Pain (1 - 3), Moderate Pain (4 - 6)  dextrose Oral Gel 15 Gram(s) Oral once PRN Blood Glucose LESS THAN 70 milliGRAM(s)/deciliter  dextrose Oral Gel 15 Gram(s) Oral once PRN Blood Glucose LESS THAN 70 milliGRAM(s)/deciliter  traMADol 50 milliGRAM(s) Oral every 12 hours PRN Moderate Pain (4 - 6)        CAPILLARY BLOOD GLUCOSE      POCT Blood Glucose.: 231 mg/dL (03 Apr 2022 21:20)  POCT Blood Glucose.: 187 mg/dL (03 Apr 2022 17:54)  POCT Blood Glucose.: 156 mg/dL (03 Apr 2022 13:08)  POCT Blood Glucose.: 108 mg/dL (03 Apr 2022 08:25)    I&O's Summary    02 Apr 2022 07:01  -  03 Apr 2022 07:00  --------------------------------------------------------  IN: 860 mL / OUT: 300 mL / NET: 560 mL    03 Apr 2022 07:01  -  03 Apr 2022 22:11  --------------------------------------------------------  IN: 729 mL / OUT: 700 mL / NET: 29 mL        T(C): 36.6 (04-03-22 @ 21:25), Max: 37.1 (04-03-22 @ 01:24)  HR: 95 (04-03-22 @ 21:25) (80 - 98)  BP: 142/63 (04-03-22 @ 21:25) (111/58 - 149/76)  RR: 18 (04-03-22 @ 21:25) (16 - 18)  SpO2: 92% (04-03-22 @ 21:25) (92% - 98%)    PHYSICAL EXAM:    NECK: Supple, No JVD  CHEST/LUNG: Clear to auscultation bilaterally; No wheezing.  HEART: Regular rate and rhythm; No murmurs, rubs, or gallops  ABDOMEN: Soft, Nontender, Nondistended; Bowel sounds present  EXTREMITIES:   RT LE wound  NEUROLOGY: AAO X 3      LABS:                        8.7    7.62  )-----------( 265      ( 03 Apr 2022 07:53 )             30.2     04-03    146<H>  |  104  |  39<H>  ----------------------------<  96  3.5   |  32<H>  |  1.48<H>    Ca    9.2      03 Apr 2022 07:53  Phos  3.0     04-03  Mg     2.1     04-03      PTT - ( 03 Apr 2022 16:36 )  PTT:50.9 sec        CAPILLARY BLOOD GLUCOSE      POCT Blood Glucose.: 231 mg/dL (03 Apr 2022 21:20)  POCT Blood Glucose.: 187 mg/dL (03 Apr 2022 17:54)  POCT Blood Glucose.: 156 mg/dL (03 Apr 2022 13:08)  POCT Blood Glucose.: 108 mg/dL (03 Apr 2022 08:25)        RADIOLOGY & ADDITIONAL TESTS:    Imaging Personally Reviewed:    Consultant(s) Notes Reviewed:      Care Discussed with Consultants/Other Providers:    Graham Pulliam MD, CMD, FACP    [address] [place] [address]  Office Tel: [phone]  Cell: [phone]

## 2022-04-03 NOTE — PROGRESS NOTE ADULT - SUBJECTIVE AND OBJECTIVE BOX
Ritesh Bell MD  Interventional Cardiology / Advance Heart Failure and Cardiac Transplant Specialist  [place] Office : [address] [place] [address] [place] [address]  Tel:   [place] Office : [address] [facility] [place] [address]  Tel: [phone]       Pt is lying in bed comfortable not in distress, no chest pains no SOB no palpitations  	  MEDICATIONS:  aspirin  chewable 81 milliGRAM(s) Oral daily  furosemide    Tablet 20 milliGRAM(s) Oral two times a day  heparin  Infusion 800 Unit(s)/Hr IV Continuous <Continuous>  losartan 100 milliGRAM(s) Oral daily  metolazone 2.5 milliGRAM(s) Oral <User Schedule>  metoprolol succinate ER 25 milliGRAM(s) Oral daily  tamsulosin 0.8 milliGRAM(s) Oral at bedtime      albuterol/ipratropium for Nebulization 3 milliLiter(s) Nebulizer every 6 hours  buDESOnide    Inhalation Suspension 0.5 milliGRAM(s) Inhalation every 12 hours  montelukast 10 milliGRAM(s) Oral daily    acetaminophen     Tablet .. 650 milliGRAM(s) Oral every 6 hours PRN  gabapentin 100 milliGRAM(s) Oral every 12 hours  traMADol 50 milliGRAM(s) Oral every 12 hours PRN    pantoprazole    Tablet 40 milliGRAM(s) Oral before breakfast    atorvastatin 40 milliGRAM(s) Oral at bedtime  dextrose 50% Injectable 25 Gram(s) IV Push once  dextrose 50% Injectable 12.5 Gram(s) IV Push once  dextrose 50% Injectable 25 Gram(s) IV Push once  dextrose 50% Injectable 25 Gram(s) IV Push once  dextrose Oral Gel 15 Gram(s) Oral once PRN  dextrose Oral Gel 15 Gram(s) Oral once PRN  finasteride 5 milliGRAM(s) Oral daily  glucagon  Injectable 1 milliGRAM(s) IntraMuscular once  glucagon  Injectable 1 milliGRAM(s) IntraMuscular once  insulin glargine Injectable (LANTUS) 30 Unit(s) SubCutaneous at bedtime  insulin lispro (ADMELOG) corrective regimen sliding scale   SubCutaneous three times a day before meals  levothyroxine 25 MICROGram(s) Oral daily    dextrose 5%. 1000 milliLiter(s) IV Continuous <Continuous>  dextrose 5%. 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    Deep Vein Thrombosis (DVT)  17 yeras ago on Coumadin    CHF (congestive heart failure)  last exacerbation in 1/2017    Enlarged prostate    GERD (gastroesophageal reflux disease)    Hernia  umblical    Calculus of bile duct without cholangitis or cholecystitis without obstruction    Atrial fibrillation    S/P Hernia Repair    S/P cataract surgery, unspecified laterality    S/P ERCP  3/2017        SOCIAL HISTORY: Substance Use (street drugs): ( x ) never used  (  ) other:    FAMILY HISTORY:      REVIEW OF SYSTEMS:  CONSTITUTIONAL: No fever, weight loss, or fatigue  EYES: No eye pain, visual disturbances, or discharge  ENMT:  No difficulty hearing, tinnitus, vertigo; No sinus or throat pain  BREASTS: No pain, masses, or nipple discharge  GASTROINTESTINAL: No abdominal or epigastric pain. No nausea, vomiting, or hematemesis; No diarrhea or constipation. No melena or hematochezia.  GENITOURINARY: No dysuria, frequency, hematuria, or incontinence  NEUROLOGICAL: No headaches, memory loss, loss of strength, numbness, or tremors  ENDOCRINE: No heat or cold intolerance; No hair loss  MUSCULOSKELETAL: No joint pain or swelling; No muscle, back, or extremity pain  PSYCHIATRIC: No depression, anxiety, mood swings, or difficulty sleeping  HEME/LYMPH: No easy bruising, or bleeding gums       PHYSICAL EXAM:  T(C): 36.6 (04-03-22 @ 09:06), Max: 37.1 (04-03-22 @ 01:24)  HR: 86 (04-03-22 @ 09:06) (74 - 98)  BP: 115/70 (04-03-22 @ 09:06) (115/70 - 149/76)  RR: 16 (04-03-22 @ 09:06) (16 - 18)  SpO2: 92% (04-03-22 @ 09:06) (92% - 98%)  Wt(kg): --  I&O's Summary    02 Apr 2022 07:01  -  03 Apr 2022 07:00  --------------------------------------------------------  IN: 860 mL / OUT: 300 mL / NET: 560 mL    03 Apr 2022 07:01  -  03 Apr 2022 13:55  --------------------------------------------------------  IN: 282 mL / OUT: 400 mL / NET: -118 mL          GENERAL: NAD  EYES:   PERRLA   ENMT:   Moist mucous membranes, Good dentition, No lesions  Cardiovascular: Normal S1 S2, No JVD, No murmurs, No edema  Respiratory: mild scattered wheezing   Gastrointestinal:  Soft, Non-tender, + BS	  Extremities: right foot wound covered                                    8.7    7.62  )-----------( 265      ( 03 Apr 2022 07:53 )             30.2     04-03    146<H>  |  104  |  39<H>  ----------------------------<  96  3.5   |  32<H>  |  1.48<H>    Ca    9.2      03 Apr 2022 07:53  Phos  3.0     04-03  Mg     2.1     04-03      proBNP:   Lipid Profile:   HgA1c:   TSH:     Consultant(s) Notes Reviewed:  [x ] YES  [ ] NO    Care Discussed with Consultants/Other Providers [ x] YES  [ ] NO    Imaging Personally Reviewed independently:  [x] YES  [ ] NO    All labs, radiologic studies, vitals, orders and medications list reviewed. Patient is seen and examined at bedside. Case discussed with medical team.

## 2022-04-03 NOTE — CHART NOTE - NSCHARTNOTEFT_GEN_A_CORE
Pre-operative Note    Preop Dx: Right foot wound  Surgeon: Dr. Quinn  Procedure: R ERIKA    Vital Signs Last 24 Hrs  T(C): 36.6 (03 Apr 2022 09:06), Max: 37.1 (03 Apr 2022 01:24)  T(F): 97.8 (03 Apr 2022 09:06), Max: 98.8 (03 Apr 2022 01:24)  HR: 86 (03 Apr 2022 09:06) (74 - 98)  BP: 115/70 (03 Apr 2022 09:06) (115/70 - 149/76)  BP(mean): --  RR: 16 (03 Apr 2022 09:06) (16 - 18)  SpO2: 92% (03 Apr 2022 09:06) (92% - 98%)                        8.7    7.62  )-----------( 265      ( 03 Apr 2022 07:53 )             30.2     04-03    146<H>  |  104  |  39<H>  ----------------------------<  96  3.5   |  32<H>  |  1.48<H>    Ca    9.2      03 Apr 2022 07:53  Phos  3.0     04-03  Mg     2.1     04-03      PT/INR - ( 01 Apr 2022 18:23 )   PT: 24.0 sec;   INR: 2.05 ratio         PTT - ( 03 Apr 2022 07:53 )  PTT:191.3 sec  Daily     Daily     EKG: in chart  CXR:  in chart  Type and Screen: 2 pending        A/P: 86y Male     - OR 4/4/2022 for ___  R ERIKA__________ with  __[place]___  - NPO past midnight, except medications  - Consent signed and in chart  - cardiology clearance for OR pending

## 2022-04-03 NOTE — PROGRESS NOTE ADULT - ASSESSMENT
Patient is a 86y old  Male who presents with a chief complaint of Preoperative Planning for Right above knee amputation.    Rt LE nonhealing wound:    Vascular/Cardio f/up noted.  ECHO  For Rt AKA    A Fib:    IV Heparin  Cw Metoprolol    COPD:    Cw Duoneb  Pulmicort    Considering pt's medical condition and nature of Sx, Pt is at intermediate risk for the surgery pending ECHO.

## 2022-04-03 NOTE — PROGRESS NOTE ADULT - ASSESSMENT
EKG -  A sense V paced PVC's  Echo - pending     a/p     1) Preop clearance for AKA - pt has h/o moderate LV dysfunction as per echo 2017, no chest pains would get 2d echo , 12 lead EKG ok, get PPM interrogated     2) HTN - continue losartan and metoprolol     3) Chronic systolic CHF - continue lasix, toprol and losartan     4) ?Atrial fib - coumadin on hold on IV heparin  EKG -  A sense V paced PVC's  Echo - Mild LV dysfunction mild aortic stenosis    a/p     1) Preop clearance for AKA - pt has h/o moderate LV dysfunction as per echo 2017, no chest pains 2d echo now shows mild LV dysfunction  , 12 lead EKG ok, get PPM interrogated , Pt is moderate risk for AKA tomorrow    2) HTN - continue losartan and metoprolol     3) Chronic systolic CHF - continue lasix, toprol and losartan     4) ?Atrial fib - coumadin on hold on IV heparin

## 2022-04-04 NOTE — PROGRESS NOTE ADULT - SUBJECTIVE AND OBJECTIVE BOX
Vascular Surgery Progress Note    INTERVAL EVENTS:   No acute events overnight.    SUBJECTIVE: Patient seen and examined at bedside with surgical team    OBJECTIVE:    Vital Signs Last 24 Hrs  T(C): 36.3 (04 Apr 2022 01:12), Max: 36.7 (03 Apr 2022 17:58)  T(F): 97.4 (04 Apr 2022 01:12), Max: 98.1 (03 Apr 2022 17:58)  HR: 86 (04 Apr 2022 01:12) (80 - 95)  BP: 161/72 (04 Apr 2022 01:12) (111/58 - 161/72)  BP(mean): --  RR: 18 (04 Apr 2022 01:12) (16 - 18)  SpO2: 92% (04 Apr 2022 01:12) (92% - 94%)I&O's Detail    02 Apr 2022 07:01  -  03 Apr 2022 07:00  --------------------------------------------------------  IN:    Heparin: 140 mL    Oral Fluid: 720 mL  Total IN: 860 mL    OUT:    Voided (mL): 300 mL  Total OUT: 300 mL    Total NET: 560 mL      03 Apr 2022 07:01  -  04 Apr 2022 04:11  --------------------------------------------------------  IN:    Heparin: 162 mL    Oral Fluid: 720 mL  Total IN: 882 mL    OUT:    Voided (mL): 900 mL  Total OUT: 900 mL    Total NET: -18 mL      MEDICATIONS  (STANDING):  albuterol/ipratropium for Nebulization 3 milliLiter(s) Nebulizer every 6 hours  aspirin  chewable 81 milliGRAM(s) Oral daily  atorvastatin 40 milliGRAM(s) Oral at bedtime  buDESOnide    Inhalation Suspension 0.5 milliGRAM(s) Inhalation every 12 hours  dextrose 5%. 1000 milliLiter(s) (100 mL/Hr) IV Continuous <Continuous>  dextrose 5%. 1000 milliLiter(s) (50 mL/Hr) IV Continuous <Continuous>  dextrose 50% Injectable 25 Gram(s) IV Push once  dextrose 50% Injectable 12.5 Gram(s) IV Push once  dextrose 50% Injectable 25 Gram(s) IV Push once  dextrose 50% Injectable 25 Gram(s) IV Push once  finasteride 5 milliGRAM(s) Oral daily  gabapentin 100 milliGRAM(s) Oral every 12 hours  glucagon  Injectable 1 milliGRAM(s) IntraMuscular once  glucagon  Injectable 1 milliGRAM(s) IntraMuscular once  heparin  Infusion 800 Unit(s)/Hr (12 mL/Hr) IV Continuous <Continuous>  insulin glargine Injectable (LANTUS) 30 Unit(s) SubCutaneous at bedtime  insulin lispro (ADMELOG) corrective regimen sliding scale   SubCutaneous three times a day before meals  levothyroxine 25 MICROGram(s) Oral daily  losartan 100 milliGRAM(s) Oral daily  metolazone 2.5 milliGRAM(s) Oral <User Schedule>  metoprolol succinate ER 25 milliGRAM(s) Oral daily  montelukast 10 milliGRAM(s) Oral daily  multivitamin/minerals 1 Tablet(s) Oral daily  pantoprazole    Tablet 40 milliGRAM(s) Oral before breakfast  tamsulosin 0.8 milliGRAM(s) Oral at bedtime    MEDICATIONS  (PRN):  acetaminophen     Tablet .. 650 milliGRAM(s) Oral every 6 hours PRN Mild Pain (1 - 3), Moderate Pain (4 - 6)  dextrose Oral Gel 15 Gram(s) Oral once PRN Blood Glucose LESS THAN 70 milliGRAM(s)/deciliter  dextrose Oral Gel 15 Gram(s) Oral once PRN Blood Glucose LESS THAN 70 milliGRAM(s)/deciliter  traMADol 50 milliGRAM(s) Oral every 12 hours PRN Moderate Pain (4 - 6)      PHYSICAL EXAM:  Constitutional:   Respiratory:   Abdomen:  Extremities:     LABS:                        8.7    7.62  )-----------( 265      ( 03 Apr 2022 07:53 )             30.2     04-03    146<H>  |  104  |  39<H>  ----------------------------<  96  3.5   |  32<H>  |  1.48<H>    Ca    9.2      03 Apr 2022 07:53  Phos  3.0     04-03  Mg     2.1     04-03      PTT - ( 04 Apr 2022 00:20 )  PTT:45.0 sec      ABO Interpretation: B (04-03-22 @ 16:29)      IMAGING:     Vascular Surgery Progress Note    INTERVAL EVENTS:   No acute events overnight.    SUBJECTIVE: Patient seen and examined at bedside with surgical team    OBJECTIVE:    Vital Signs Last 24 Hrs  T(C): 36.3 (04 Apr 2022 01:12), Max: 36.7 (03 Apr 2022 17:58)  T(F): 97.4 (04 Apr 2022 01:12), Max: 98.1 (03 Apr 2022 17:58)  HR: 86 (04 Apr 2022 01:12) (80 - 95)  BP: 161/72 (04 Apr 2022 01:12) (111/58 - 161/72)  BP(mean): --  RR: 18 (04 Apr 2022 01:12) (16 - 18)  SpO2: 92% (04 Apr 2022 01:12) (92% - 94%)I&O's Detail    02 Apr 2022 07:01  -  03 Apr 2022 07:00  --------------------------------------------------------  IN:    Heparin: 140 mL    Oral Fluid: 720 mL  Total IN: 860 mL    OUT:    Voided (mL): 300 mL  Total OUT: 300 mL    Total NET: 560 mL      03 Apr 2022 07:01  -  04 Apr 2022 04:11  --------------------------------------------------------  IN:    Heparin: 162 mL    Oral Fluid: 720 mL  Total IN: 882 mL    OUT:    Voided (mL): 900 mL  Total OUT: 900 mL    Total NET: -18 mL      MEDICATIONS  (STANDING):  albuterol/ipratropium for Nebulization 3 milliLiter(s) Nebulizer every 6 hours  aspirin  chewable 81 milliGRAM(s) Oral daily  atorvastatin 40 milliGRAM(s) Oral at bedtime  buDESOnide    Inhalation Suspension 0.5 milliGRAM(s) Inhalation every 12 hours  dextrose 5%. 1000 milliLiter(s) (100 mL/Hr) IV Continuous <Continuous>  dextrose 5%. 1000 milliLiter(s) (50 mL/Hr) IV Continuous <Continuous>  dextrose 50% Injectable 25 Gram(s) IV Push once  dextrose 50% Injectable 12.5 Gram(s) IV Push once  dextrose 50% Injectable 25 Gram(s) IV Push once  dextrose 50% Injectable 25 Gram(s) IV Push once  finasteride 5 milliGRAM(s) Oral daily  gabapentin 100 milliGRAM(s) Oral every 12 hours  glucagon  Injectable 1 milliGRAM(s) IntraMuscular once  glucagon  Injectable 1 milliGRAM(s) IntraMuscular once  heparin  Infusion 800 Unit(s)/Hr (12 mL/Hr) IV Continuous <Continuous>  insulin glargine Injectable (LANTUS) 30 Unit(s) SubCutaneous at bedtime  insulin lispro (ADMELOG) corrective regimen sliding scale   SubCutaneous three times a day before meals  levothyroxine 25 MICROGram(s) Oral daily  losartan 100 milliGRAM(s) Oral daily  metolazone 2.5 milliGRAM(s) Oral <User Schedule>  metoprolol succinate ER 25 milliGRAM(s) Oral daily  montelukast 10 milliGRAM(s) Oral daily  multivitamin/minerals 1 Tablet(s) Oral daily  pantoprazole    Tablet 40 milliGRAM(s) Oral before breakfast  tamsulosin 0.8 milliGRAM(s) Oral at bedtime    MEDICATIONS  (PRN):  acetaminophen     Tablet .. 650 milliGRAM(s) Oral every 6 hours PRN Mild Pain (1 - 3), Moderate Pain (4 - 6)  dextrose Oral Gel 15 Gram(s) Oral once PRN Blood Glucose LESS THAN 70 milliGRAM(s)/deciliter  dextrose Oral Gel 15 Gram(s) Oral once PRN Blood Glucose LESS THAN 70 milliGRAM(s)/deciliter  traMADol 50 milliGRAM(s) Oral every 12 hours PRN Moderate Pain (4 - 6)      PHYSICAL EXAM:  Gen: NAD pleasant and conversant  HEENT: NC/AT EOMI  Neck: supple, FROM  Chest: symmetric chest rise, non-agonal on Room air  Abd: Soft, nontender, nondistended  Vascular: Unable to appreciate RLE pulses, 1+LLE pulses, pitting edema to knees bilaterally      LABS:                          7.7    6.99  )-----------( 250      ( 04 Apr 2022 04:36 )             26.5     04-04    148<H>  |  107  |  43<H>  ----------------------------<  127<H>  3.9   |  28  |  1.52<H>    Ca    9.0      04 Apr 2022 04:36  Phos  3.3     04-04  Mg     2.2     04-04        PTT - ( 04 Apr 2022 00:20 )  PTT:45.0 sec      ABO Interpretation: JAMILAH (04-03-22 @ 16:29)      IMAGING:

## 2022-04-04 NOTE — PROGRESS NOTE ADULT - SUBJECTIVE AND OBJECTIVE BOX
Patient is a 86y old  Male who presents with a chief complaint of Preoperative Planning for Right above knee amputation (04 Apr 2022 17:01)      SUBJECTIVE / OVERNIGHT EVENTS:    Events noted. S/p right guillotine below knee amputation  CONSTITUTIONAL: No fever,  or fatigue  RESPIRATORY: No cough, wheezing,  No shortness of breath  CARDIOVASCULAR: No chest pain, palpitations, dizziness, or leg swelling  GASTROINTESTINAL: No abdominal or epigastric pain. No nausea, vomiting.      MEDICATIONS  (STANDING):  acetaminophen     Tablet .. 975 milliGRAM(s) Oral every 6 hours  albuterol/ipratropium for Nebulization 3 milliLiter(s) Nebulizer every 6 hours  aspirin  chewable 81 milliGRAM(s) Oral daily  atorvastatin 40 milliGRAM(s) Oral at bedtime  buDESOnide    Inhalation Suspension 0.5 milliGRAM(s) Inhalation every 12 hours  dextrose 5%. 1000 milliLiter(s) (100 mL/Hr) IV Continuous <Continuous>  dextrose 5%. 1000 milliLiter(s) (50 mL/Hr) IV Continuous <Continuous>  dextrose 50% Injectable 25 Gram(s) IV Push once  dextrose 50% Injectable 25 Gram(s) IV Push once  dextrose 50% Injectable 12.5 Gram(s) IV Push once  dextrose 50% Injectable 25 Gram(s) IV Push once  finasteride 5 milliGRAM(s) Oral daily  gabapentin 100 milliGRAM(s) Oral every 12 hours  glucagon  Injectable 1 milliGRAM(s) IntraMuscular once  glucagon  Injectable 1 milliGRAM(s) IntraMuscular once  heparin   Injectable 5000 Unit(s) SubCutaneous every 8 hours  insulin glargine Injectable (LANTUS) 30 Unit(s) SubCutaneous at bedtime  insulin lispro (ADMELOG) corrective regimen sliding scale   SubCutaneous three times a day before meals  levothyroxine 25 MICROGram(s) Oral daily  losartan 100 milliGRAM(s) Oral daily  metolazone 2.5 milliGRAM(s) Oral <User Schedule>  metoprolol succinate ER 25 milliGRAM(s) Oral daily  montelukast 10 milliGRAM(s) Oral daily  multivitamin/minerals 1 Tablet(s) Oral daily  pantoprazole    Tablet 40 milliGRAM(s) Oral before breakfast  tamsulosin 0.8 milliGRAM(s) Oral at bedtime    MEDICATIONS  (PRN):  dextrose Oral Gel 15 Gram(s) Oral once PRN Blood Glucose LESS THAN 70 milliGRAM(s)/deciliter  dextrose Oral Gel 15 Gram(s) Oral once PRN Blood Glucose LESS THAN 70 milliGRAM(s)/deciliter  oxyCODONE    IR 5 milliGRAM(s) Oral every 4 hours PRN Severe Pain (7 - 10)  traMADol 50 milliGRAM(s) Oral every 12 hours PRN Moderate Pain (4 - 6)        CAPILLARY BLOOD GLUCOSE      POCT Blood Glucose.: 242 mg/dL (04 Apr 2022 21:26)  POCT Blood Glucose.: 186 mg/dL (04 Apr 2022 16:05)  POCT Blood Glucose.: 144 mg/dL (04 Apr 2022 12:30)  POCT Blood Glucose.: 115 mg/dL (04 Apr 2022 05:50)    I&O's Summary    03 Apr 2022 07:01  -  04 Apr 2022 07:00  --------------------------------------------------------  IN: 963 mL / OUT: 1050 mL / NET: -87 mL    04 Apr 2022 07:01  -  04 Apr 2022 22:23  --------------------------------------------------------  IN: 1000 mL / OUT: 800 mL / NET: 200 mL        T(C): 36.3 (04-04-22 @ 21:49), Max: 36.8 (04-04-22 @ 05:04)  HR: 76 (04-04-22 @ 21:49) (59 - 92)  BP: 105/66 (04-04-22 @ 21:49) (100/54 - 161/72)  RR: 18 (04-04-22 @ 21:49) (12 - 18)  SpO2: 94% (04-04-22 @ 21:49) (90% - 100%)    PHYSICAL EXAM:    NECK: Supple, No JVD  CHEST/LUNG: Clear to auscultation bilaterally; No wheezing.  HEART: Regular rate and rhythm; No murmurs, rubs, or gallops  ABDOMEN: Soft, Nontender, Nondistended; Bowel sounds present  EXTREMITIES: S/p right guillotine below knee amputation  NEUROLOGY: AAO X 3      LABS:                        7.7    6.99  )-----------( 250      ( 04 Apr 2022 04:36 )             26.5     04-04    148<H>  |  107  |  43<H>  ----------------------------<  127<H>  3.9   |  28  |  1.52<H>    Ca    9.0      04 Apr 2022 04:36  Phos  3.3     04-04  Mg     2.2     04-04      PT/INR - ( 04 Apr 2022 04:36 )   PT: 15.3 sec;   INR: 1.32 ratio         PTT - ( 04 Apr 2022 04:36 )  PTT:61.6 sec        CAPILLARY BLOOD GLUCOSE      POCT Blood Glucose.: 242 mg/dL (04 Apr 2022 21:26)  POCT Blood Glucose.: 186 mg/dL (04 Apr 2022 16:05)  POCT Blood Glucose.: 144 mg/dL (04 Apr 2022 12:30)  POCT Blood Glucose.: 115 mg/dL (04 Apr 2022 05:50)        RADIOLOGY & ADDITIONAL TESTS:    Imaging Personally Reviewed:    Consultant(s) Notes Reviewed:      Care Discussed with Consultants/Other Providers:    Graham Pulliam MD, CMD, FACP    [address] [place] [address]  Office Tel: [phone]  Cell: [phone]

## 2022-04-04 NOTE — PROGRESS NOTE ADULT - ASSESSMENT
86M with PMH of COPD (not on home o2), prior smoking history (40 pack years), HTN, HLD, Afib, CHF, T2DM, CVA, BPH, and PAD admitted for elective RLE AKA. Renal consulted for CKD Mx.    CKD 3, stable  baseline Cr ~1.7  k stable  Hypervolemic clinically  c/w losartan 100mg daily  off lasix and on metolazone  monitor BMP daily and u/o   dose all meds for eGFR  avoid NSAIDs/Nephrotoxics.    HYpernatremia- encourage free h20 intake  Hypokalemia -magnesium  stabke  HTN, controlled. bp optimal   continue ARB BB. monitor bp    Rt LE nonhealing wound:  Vascular/Cardio f/up noted.  ECHO  For Rt BKA today  optimized renal stand point for OR. keep K ~4.0    Dr Knight  [phone]

## 2022-04-04 NOTE — PROGRESS NOTE ADULT - SUBJECTIVE AND OBJECTIVE BOX
Ritesh Bell MD  Interventional Cardiology / Endovascular Specialist  [place] Office : [phone] [place] [address] [place] [address]  Tel:   [place] Office : [phone] [facility] [place] [address]  Tel: [phone]    Pt is lying in bed comfortable not in distress, no chest pains no SOB no palpitations  	  MEDICATIONS:  aspirin  chewable 81 milliGRAM(s) Oral daily  heparin   Injectable 5000 Unit(s) SubCutaneous every 8 hours  losartan 100 milliGRAM(s) Oral daily  metolazone 2.5 milliGRAM(s) Oral <User Schedule>  metoprolol succinate ER 25 milliGRAM(s) Oral daily  tamsulosin 0.8 milliGRAM(s) Oral at bedtime      albuterol/ipratropium for Nebulization 3 milliLiter(s) Nebulizer every 6 hours  buDESOnide    Inhalation Suspension 0.5 milliGRAM(s) Inhalation every 12 hours  montelukast 10 milliGRAM(s) Oral daily    acetaminophen     Tablet .. 975 milliGRAM(s) Oral every 6 hours  gabapentin 100 milliGRAM(s) Oral every 12 hours  oxyCODONE    IR 5 milliGRAM(s) Oral every 4 hours PRN  traMADol 50 milliGRAM(s) Oral every 12 hours PRN    pantoprazole    Tablet 40 milliGRAM(s) Oral before breakfast    atorvastatin 40 milliGRAM(s) Oral at bedtime  dextrose 50% Injectable 12.5 Gram(s) IV Push once  dextrose 50% Injectable 25 Gram(s) IV Push once  dextrose 50% Injectable 25 Gram(s) IV Push once  dextrose 50% Injectable 25 Gram(s) IV Push once  dextrose Oral Gel 15 Gram(s) Oral once PRN  dextrose Oral Gel 15 Gram(s) Oral once PRN  finasteride 5 milliGRAM(s) Oral daily  glucagon  Injectable 1 milliGRAM(s) IntraMuscular once  glucagon  Injectable 1 milliGRAM(s) IntraMuscular once  insulin glargine Injectable (LANTUS) 30 Unit(s) SubCutaneous at bedtime  insulin lispro (ADMELOG) corrective regimen sliding scale   SubCutaneous three times a day before meals  levothyroxine 25 MICROGram(s) Oral daily    dextrose 5%. 1000 milliLiter(s) IV Continuous <Continuous>  dextrose 5%. 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    Deep Vein Thrombosis (DVT)  17 yeras ago on Coumadin    CHF (congestive heart failure)  last exacerbation in 1/2017    Enlarged prostate    GERD (gastroesophageal reflux disease)    Hernia  umblical    Calculus of bile duct without cholangitis or cholecystitis without obstruction    Atrial fibrillation    S/P Hernia Repair    S/P cataract surgery, unspecified laterality    S/P ERCP  3/2017        SOCIAL HISTORY: Substance Use (street drugs): ( x ) never used  (  ) other:    FAMILY HISTORY:      REVIEW OF SYSTEMS:  CONSTITUTIONAL: No fever, weight loss, or fatigue  EYES: No eye pain, visual disturbances, or discharge  ENMT:  No difficulty hearing, tinnitus, vertigo; No sinus or throat pain  BREASTS: No pain, masses, or nipple discharge  GASTROINTESTINAL: No abdominal or epigastric pain. No nausea, vomiting, or hematemesis; No diarrhea or constipation. No melena or hematochezia.  GENITOURINARY: No dysuria, frequency, hematuria, or incontinence  NEUROLOGICAL: No headaches, memory loss, loss of strength, numbness, or tremors  ENDOCRINE: No heat or cold intolerance; No hair loss  MUSCULOSKELETAL: No joint pain or swelling; No muscle, back, or extremity pain  PSYCHIATRIC: No depression, anxiety, mood swings, or difficulty sleeping  HEME/LYMPH: No easy bruising, or bleeding gums  All others negative    PHYSICAL EXAM:  T(C): 36.3 (04-05-22 @ 05:26), Max: 36.7 (04-04-22 @ 10:18)  HR: 93 (04-05-22 @ 05:26) (59 - 93)  BP: 156/64 (04-05-22 @ 05:26) (100/54 - 156/67)  RR: 18 (04-05-22 @ 05:26) (12 - 18)  SpO2: 92% (04-05-22 @ 05:26) (90% - 100%)  Wt(kg): --  I&O's Summary    03 Apr 2022 07:01  -  04 Apr 2022 07:00  --------------------------------------------------------  IN: 963 mL / OUT: 1050 mL / NET: -87 mL    04 Apr 2022 07:01  -  05 Apr 2022 05:37  --------------------------------------------------------  IN: 1120 mL / OUT: 1200 mL / NET: -80 mL      Height (cm): 172.7 (04-04 @ 11:00)  Weight (kg): 92.4 (04-04 @ 11:00)  BMI (kg/m2): 31 (04-04 @ 11:00)  BSA (m2): 2.06 (04-04 @ 11:00)        GENERAL: NAD  EYES:   PERRLA   ENMT:   Moist mucous membranes, Good dentition, No lesions  Cardiovascular: Normal S1 S2, No JVD, No murmurs, No edema  Respiratory: mild scattered wheezing   Gastrointestinal:  Soft, Non-tender, + BS	  Extremities: right foot wound covered                                  7.7    6.99  )-----------( 250      ( 04 Apr 2022 04:36 )             26.5     04-04    148<H>  |  107  |  43<H>  ----------------------------<  127<H>  3.9   |  28  |  1.52<H>    Ca    9.0      04 Apr 2022 04:36  Phos  3.3     04-04  Mg     2.2     04-04      proBNP:   Lipid Profile:   HgA1c:   TSH:     Consultant(s) Notes Reviewed:  [x ] YES  [ ] NO    Care Discussed with Consultants/Other Providers [ x] YES  [ ] NO    Imaging Personally Reviewed independently:  [x] YES  [ ] NO    All labs, radiologic studies, vitals, orders and medications list reviewed. Patient is seen and examined at bedside. Case discussed with medical team.

## 2022-04-04 NOTE — PROGRESS NOTE ADULT - ASSESSMENT
Patient is a 86y old  Male who presents with a chief complaint of Preoperative Planning for Right above knee amputation.    Rt LE nonhealing wound:    Vascular/Cardio f/up noted.  S/p Rt AKA    A Fib:    SQ Heparin  Cw Metoprolol    COPD:    Cw Duoneb  Pulmicort

## 2022-04-04 NOTE — CHART NOTE - NSCHARTNOTEFT_GEN_A_CORE
POST-OP NOTE    LIBRA PEÑA | [id number] | [facility] 9MON 910 D1    Procedure: s/p R Aliya JAMES    Subjective: Patient seen and examined at bedside. Complains of mild-moderate leg pain, denies nausea, vomit, SOB or chest pain. Patient tolerating diet. Voided properly.      Vital Signs Last 24 Hrs  T(C): 36.3 (04 Apr 2022 18:07), Max: 36.8 (04 Apr 2022 05:04)  T(F): 97.3 (04 Apr 2022 18:07), Max: 98.3 (04 Apr 2022 05:04)  HR: 73 (04 Apr 2022 18:07) (59 - 95)  BP: 152/82 (04 Apr 2022 18:07) (100/54 - 161/72)  BP(mean): 97 (04 Apr 2022 14:30) (74 - 102)  RR: 18 (04 Apr 2022 18:07) (12 - 18)  SpO2: 96% (04 Apr 2022 18:07) (90% - 100%)  I&O's Summary    03 Apr 2022 07:01  -  04 Apr 2022 07:00  --------------------------------------------------------  IN: 963 mL / OUT: 1050 mL / NET: -87 mL    04 Apr 2022 07:01  -  04 Apr 2022 20:10  --------------------------------------------------------  IN: 760 mL / OUT: 800 mL / NET: -40 mL                            7.7    6.99  )-----------( 250      ( 04 Apr 2022 04:36 )             26.5     04-04    148<H>  |  107  |  43<H>  ----------------------------<  127<H>  3.9   |  28  |  1.52<H>    Ca    9.0      04 Apr 2022 04:36  Phos  3.3     04-04  Mg     2.2     04-04     PT/INR - ( 04 Apr 2022 04:36 )   PT: 15.3 sec;   INR: 1.32 ratio         PTT - ( 04 Apr 2022 04:36 )  PTT:61.6 sec    PHYSICAL EXAM:  Gen: NAD  Resp: breathing easily, no stridor  CV: RRR  Abdomen: soft, nontender, nondistended  Extremity: RLE BKA with clean dry, intact dressing. No bleeding noted on dressing.         Vascular surgery   [id number] POST-OP NOTE    LIBRA PEÑA | [id number] | [facility] 9MON 910 D1    Procedure: s/p R Aliya JAMES    Subjective: Patient seen and examined at bedside. Complains of mild-moderate leg pain, denies nausea, vomit, SOB or chest pain. Patient tolerating diet. Voided properly.      Vital Signs Last 24 Hrs  T(C): 36.3 (04 Apr 2022 18:07), Max: 36.8 (04 Apr 2022 05:04)  T(F): 97.3 (04 Apr 2022 18:07), Max: 98.3 (04 Apr 2022 05:04)  HR: 73 (04 Apr 2022 18:07) (59 - 95)  BP: 152/82 (04 Apr 2022 18:07) (100/54 - 161/72)  BP(mean): 97 (04 Apr 2022 14:30) (74 - 102)  RR: 18 (04 Apr 2022 18:07) (12 - 18)  SpO2: 96% (04 Apr 2022 18:07) (90% - 100%)  I&O's Summary    03 Apr 2022 07:01  -  04 Apr 2022 07:00  --------------------------------------------------------  IN: 963 mL / OUT: 1050 mL / NET: -87 mL    04 Apr 2022 07:01  -  04 Apr 2022 20:10  --------------------------------------------------------  IN: 760 mL / OUT: 800 mL / NET: -40 mL                            7.7    6.99  )-----------( 250      ( 04 Apr 2022 04:36 )             26.5     04-04    148<H>  |  107  |  43<H>  ----------------------------<  127<H>  3.9   |  28  |  1.52<H>    Ca    9.0      04 Apr 2022 04:36  Phos  3.3     04-04  Mg     2.2     04-04     PT/INR - ( 04 Apr 2022 04:36 )   PT: 15.3 sec;   INR: 1.32 ratio         PTT - ( 04 Apr 2022 04:36 )  PTT:61.6 sec    PHYSICAL EXAM:  Gen: NAD  Resp: breathing easily, no stridor  CV: RRR  Abdomen: soft, nontender, nondistended  Extremity: RLE BKA with clean dry, intact dressing. No bleeding noted on dressing. Knee immobilizer in place.         Vascular surgery   [id number]

## 2022-04-04 NOTE — PROGRESS NOTE ADULT - ASSESSMENT
Assessment: 86 year old man with HTN HLD DM and nonhealing wounds of RLE who is non-ambulatory at home presents for preoperative assessment and right BELOW knee amputation.     Plan:  - OR Today  for right BELOW knee amputation with Dr. Quinn    Vascular Surgery   [id number] Assessment: 86 year old man with HTN HLD DM and nonhealing wounds of RLE who is non-ambulatory at home presents for preoperative assessment and right BELOW knee amputation.     Plan:  - OR Today  for right BELOW knee amputation with Dr. Quinn  - Vern EP, Jour XT DR Warner PPM interrogated von 3/15/22.  - NPO for OR  - cont baby Asa  - Pain control      Vascular Surgery   [id number]

## 2022-04-04 NOTE — PROGRESS NOTE ADULT - ASSESSMENT
EKG -  A sense V paced PVC's  Echo - Mild LV dysfunction mild aortic stenosis    a/p     1) Preop clearance for AKA - pt has h/o moderate LV dysfunction as per echo 2017, no chest pains 2d echo now shows mild LV dysfunction  , 12 lead EKG ok,  PPM interrogated , s/p AKA no events     2) HTN - continue losartan and metoprolol     3) Chronic systolic CHF - continue lasix, toprol and losartan     4) ?Atrial fib - coumadin on hold on IV heparin

## 2022-04-04 NOTE — PROGRESS NOTE ADULT - SUBJECTIVE AND OBJECTIVE BOX
Patient seen and examined  no complaints    No Known Allergies    Hospital Medications:   MEDICATIONS  (STANDING):  acetaminophen     Tablet .. 975 milliGRAM(s) Oral every 6 hours  albuterol/ipratropium for Nebulization 3 milliLiter(s) Nebulizer every 6 hours  aspirin  chewable 81 milliGRAM(s) Oral daily  atorvastatin 40 milliGRAM(s) Oral at bedtime  buDESOnide    Inhalation Suspension 0.5 milliGRAM(s) Inhalation every 12 hours  dextrose 5%. 1000 milliLiter(s) (100 mL/Hr) IV Continuous <Continuous>  dextrose 5%. 1000 milliLiter(s) (50 mL/Hr) IV Continuous <Continuous>  dextrose 50% Injectable 25 Gram(s) IV Push once  dextrose 50% Injectable 12.5 Gram(s) IV Push once  dextrose 50% Injectable 25 Gram(s) IV Push once  dextrose 50% Injectable 25 Gram(s) IV Push once  finasteride 5 milliGRAM(s) Oral daily  gabapentin 100 milliGRAM(s) Oral every 12 hours  glucagon  Injectable 1 milliGRAM(s) IntraMuscular once  glucagon  Injectable 1 milliGRAM(s) IntraMuscular once  heparin   Injectable 5000 Unit(s) SubCutaneous every 8 hours  insulin glargine Injectable (LANTUS) 30 Unit(s) SubCutaneous at bedtime  insulin lispro (ADMELOG) corrective regimen sliding scale   SubCutaneous three times a day before meals  levothyroxine 25 MICROGram(s) Oral daily  losartan 100 milliGRAM(s) Oral daily  metolazone 2.5 milliGRAM(s) Oral <User Schedule>  metoprolol succinate ER 25 milliGRAM(s) Oral daily  montelukast 10 milliGRAM(s) Oral daily  multivitamin/minerals 1 Tablet(s) Oral daily  pantoprazole    Tablet 40 milliGRAM(s) Oral before breakfast  tamsulosin 0.8 milliGRAM(s) Oral at bedtime        VITALS:  T(F): 97.4 (04-04-22 @ 16:15), Max: 98.3 (04-04-22 @ 05:04)  HR: 73 (04-04-22 @ 16:15)  BP: 119/70 (04-04-22 @ 16:15)  RR: 18 (04-04-22 @ 16:15)  SpO2: 93% (04-04-22 @ 16:15)  Wt(kg): --    04-03 @ 07:01  -  04-04 @ 07:00  --------------------------------------------------------  IN: 963 mL / OUT: 1050 mL / NET: -87 mL    04-04 @ 07:01  -  04-04 @ 17:02  --------------------------------------------------------  IN: 520 mL / OUT: 700 mL / NET: -180 mL      Height (cm): 172.7 (04-04 @ 11:00)  Weight (kg): 92.4 (04-04 @ 11:00)  BMI (kg/m2): 31 (04-04 @ 11:00)  BSA (m2): 2.06 (04-04 @ 11:00)  PHYSICAL EXAM:  Constitutional: NAD  HEENT: anicteric sclera  Neck: No JVD  Respiratory: CTAB, no wheezes, rales or rhonchi  Cardiovascular: S1, S2, RRR  Gastrointestinal: BS+, soft, NT/ND  Extremities: 3+ peripheral edema b/l; Rt foot dressing+   Neurological: A/O x 3, no focal deficits  Psychiatric: Normal mood, normal affect  : No hudson.     LABS:  04-04    148<H>  |  107  |  43<H>  ----------------------------<  127<H>  3.9   |  28  |  1.52<H>    Ca    9.0      04 Apr 2022 04:36  Phos  3.3     04-04  Mg     2.2     04-04      Creatinine Trend: 1.52 <--, 1.48 <--, 1.33 <--                        7.7    6.99  )-----------( 250      ( 04 Apr 2022 04:36 )             26.5     Urine Studies:      RADIOLOGY & ADDITIONAL STUDIES:

## 2022-04-05 NOTE — PROGRESS NOTE ADULT - SUBJECTIVE AND OBJECTIVE BOX
Patient is a 86y old  Male who presents with a chief complaint of Preoperative Planning for Right above knee amputation (04 Apr 2022 17:01)      SUBJECTIVE / OVERNIGHT EVENTS:    Events noted. S/p right guillotine below knee amputation  CONSTITUTIONAL: No fever,  or fatigue  RESPIRATORY: No cough, wheezing,  No shortness of breath  CARDIOVASCULAR: No chest pain, palpitations, dizziness, or leg swelling  GASTROINTESTINAL: No abdominal or epigastric pain.       MEDICATIONS  (STANDING):  acetaminophen     Tablet .. 975 milliGRAM(s) Oral every 6 hours  albuterol/ipratropium for Nebulization 3 milliLiter(s) Nebulizer every 6 hours  aspirin  chewable 81 milliGRAM(s) Oral daily  atorvastatin 40 milliGRAM(s) Oral at bedtime  buDESOnide    Inhalation Suspension 0.5 milliGRAM(s) Inhalation every 12 hours  dextrose 5%. 1000 milliLiter(s) (100 mL/Hr) IV Continuous <Continuous>  dextrose 5%. 1000 milliLiter(s) (50 mL/Hr) IV Continuous <Continuous>  dextrose 50% Injectable 25 Gram(s) IV Push once  dextrose 50% Injectable 25 Gram(s) IV Push once  dextrose 50% Injectable 12.5 Gram(s) IV Push once  dextrose 50% Injectable 25 Gram(s) IV Push once  finasteride 5 milliGRAM(s) Oral daily  gabapentin 100 milliGRAM(s) Oral every 12 hours  glucagon  Injectable 1 milliGRAM(s) IntraMuscular once  glucagon  Injectable 1 milliGRAM(s) IntraMuscular once  heparin   Injectable 5000 Unit(s) SubCutaneous every 8 hours  insulin glargine Injectable (LANTUS) 30 Unit(s) SubCutaneous at bedtime  insulin lispro (ADMELOG) corrective regimen sliding scale   SubCutaneous three times a day before meals  levothyroxine 25 MICROGram(s) Oral daily  losartan 100 milliGRAM(s) Oral daily  metolazone 2.5 milliGRAM(s) Oral <User Schedule>  metoprolol succinate ER 25 milliGRAM(s) Oral daily  montelukast 10 milliGRAM(s) Oral daily  multivitamin/minerals 1 Tablet(s) Oral daily  pantoprazole    Tablet 40 milliGRAM(s) Oral before breakfast  tamsulosin 0.8 milliGRAM(s) Oral at bedtime    MEDICATIONS  (PRN):  dextrose Oral Gel 15 Gram(s) Oral once PRN Blood Glucose LESS THAN 70 milliGRAM(s)/deciliter  dextrose Oral Gel 15 Gram(s) Oral once PRN Blood Glucose LESS THAN 70 milliGRAM(s)/deciliter  oxyCODONE    IR 5 milliGRAM(s) Oral every 4 hours PRN Severe Pain (7 - 10)  traMADol 50 milliGRAM(s) Oral every 12 hours PRN Moderate Pain (4 - 6)        CAPILLARY BLOOD GLUCOSE      POCT Blood Glucose.: 242 mg/dL (04 Apr 2022 21:26)  POCT Blood Glucose.: 186 mg/dL (04 Apr 2022 16:05)  POCT Blood Glucose.: 144 mg/dL (04 Apr 2022 12:30)  POCT Blood Glucose.: 115 mg/dL (04 Apr 2022 05:50)    I&O's Summary    03 Apr 2022 07:01  -  04 Apr 2022 07:00  --------------------------------------------------------  IN: 963 mL / OUT: 1050 mL / NET: -87 mL    04 Apr 2022 07:01  -  04 Apr 2022 22:23  --------------------------------------------------------  IN: 1000 mL / OUT: 800 mL / NET: 200 mL        T(C): 36.3 (04-04-22 @ 21:49), Max: 36.8 (04-04-22 @ 05:04)  HR: 76 (04-04-22 @ 21:49) (59 - 92)  BP: 105/66 (04-04-22 @ 21:49) (100/54 - 161/72)  RR: 18 (04-04-22 @ 21:49) (12 - 18)  SpO2: 94% (04-04-22 @ 21:49) (90% - 100%)    PHYSICAL EXAM:    NECK: Supple, No JVD  CHEST/LUNG: Clear to auscultation bilaterally; No wheezing.  HEART: Regular rate and rhythm; No murmurs, rubs, or gallops  ABDOMEN: Soft, Nontender, Nondistended; Bowel sounds present  EXTREMITIES: S/p right guillotine below knee amputation  NEUROLOGY: AAO X 3      LABS:                        7.7    6.99  )-----------( 250      ( 04 Apr 2022 04:36 )             26.5     04-04    148<H>  |  107  |  43<H>  ----------------------------<  127<H>  3.9   |  28  |  1.52<H>    Ca    9.0      04 Apr 2022 04:36  Phos  3.3     04-04  Mg     2.2     04-04      PT/INR - ( 04 Apr 2022 04:36 )   PT: 15.3 sec;   INR: 1.32 ratio         PTT - ( 04 Apr 2022 04:36 )  PTT:61.6 sec        CAPILLARY BLOOD GLUCOSE      POCT Blood Glucose.: 242 mg/dL (04 Apr 2022 21:26)  POCT Blood Glucose.: 186 mg/dL (04 Apr 2022 16:05)  POCT Blood Glucose.: 144 mg/dL (04 Apr 2022 12:30)  POCT Blood Glucose.: 115 mg/dL (04 Apr 2022 05:50)        RADIOLOGY & ADDITIONAL TESTS:    Imaging Personally Reviewed:    Consultant(s) Notes Reviewed:      Care Discussed with Consultants/Other Providers:    Graham Pulliam MD, CMD, FACP    [address] [place] [address]  Office Tel: [phone]  Cell: [phone]

## 2022-04-05 NOTE — PHYSICAL THERAPY INITIAL EVALUATION ADULT - GENERAL OBSERVATIONS, REHAB EVAL
Pt s/p R BKA on 4/4, NWB RLE. Pt was receievd semi-supine in bed in NAD, on 2L O2 NC saturating at 85%, +IV, +R Knee immobilizer, A&Ox4, wife and daughter at bedside, agreeable to PT eval-ORACIO Childers made aware of Spo2%-increased O2 to 4L, O2 stats increased to 87-88%.

## 2022-04-05 NOTE — PROGRESS NOTE ADULT - SUBJECTIVE AND OBJECTIVE BOX
Ritesh Bell MD  Interventional Cardiology / Endovascular Specialist  [place] Office : [address] [place] [address] [place] [address]  Tel:   [place] Office : [address] [facility] [place] [address]  Tel: [phone]    Pt is lying in bed comfortable not in distress, no chest pains no SOB no palpitations  	  MEDICATIONS:  aspirin  chewable 81 milliGRAM(s) Oral daily  heparin  Infusion 1200 Unit(s)/Hr IV Continuous <Continuous>  losartan 100 milliGRAM(s) Oral daily  metolazone 2.5 milliGRAM(s) Oral <User Schedule>  metoprolol succinate ER 25 milliGRAM(s) Oral daily  tamsulosin 0.8 milliGRAM(s) Oral at bedtime      albuterol/ipratropium for Nebulization 3 milliLiter(s) Nebulizer every 6 hours  buDESOnide    Inhalation Suspension 0.5 milliGRAM(s) Inhalation every 12 hours  montelukast 10 milliGRAM(s) Oral daily    acetaminophen     Tablet .. 975 milliGRAM(s) Oral every 6 hours  gabapentin 100 milliGRAM(s) Oral every 12 hours  oxyCODONE    IR 5 milliGRAM(s) Oral every 4 hours PRN  traMADol 50 milliGRAM(s) Oral every 12 hours PRN    pantoprazole    Tablet 40 milliGRAM(s) Oral before breakfast    atorvastatin 40 milliGRAM(s) Oral at bedtime  dextrose 50% Injectable 25 Gram(s) IV Push once  dextrose 50% Injectable 12.5 Gram(s) IV Push once  dextrose 50% Injectable 25 Gram(s) IV Push once  dextrose 50% Injectable 25 Gram(s) IV Push once  dextrose Oral Gel 15 Gram(s) Oral once PRN  dextrose Oral Gel 15 Gram(s) Oral once PRN  finasteride 5 milliGRAM(s) Oral daily  glucagon  Injectable 1 milliGRAM(s) IntraMuscular once  glucagon  Injectable 1 milliGRAM(s) IntraMuscular once  insulin glargine Injectable (LANTUS) 30 Unit(s) SubCutaneous at bedtime  insulin lispro (ADMELOG) corrective regimen sliding scale   SubCutaneous three times a day before meals  levothyroxine 25 MICROGram(s) Oral daily    dextrose 5%. 1000 milliLiter(s) IV Continuous <Continuous>  dextrose 5%. 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    Deep Vein Thrombosis (DVT)  17 yeras ago on Coumadin    CHF (congestive heart failure)  last exacerbation in 1/2017    Enlarged prostate    GERD (gastroesophageal reflux disease)    Hernia  umblical    Calculus of bile duct without cholangitis or cholecystitis without obstruction    Atrial fibrillation    S/P Hernia Repair    S/P cataract surgery, unspecified laterality    S/P ERCP  3/2017        SOCIAL HISTORY: Substance Use (street drugs): ( x ) never used  (  ) other:    FAMILY HISTORY:      REVIEW OF SYSTEMS:  CONSTITUTIONAL: No fever, weight loss, or fatigue  EYES: No eye pain, visual disturbances, or discharge  ENMT:  No difficulty hearing, tinnitus, vertigo; No sinus or throat pain  BREASTS: No pain, masses, or nipple discharge  GASTROINTESTINAL: No abdominal or epigastric pain. No nausea, vomiting, or hematemesis; No diarrhea or constipation. No melena or hematochezia.  GENITOURINARY: No dysuria, frequency, hematuria, or incontinence  NEUROLOGICAL: No headaches, memory loss, loss of strength, numbness, or tremors  ENDOCRINE: No heat or cold intolerance; No hair loss  MUSCULOSKELETAL: No joint pain or swelling; No muscle, back, or extremity pain  PSYCHIATRIC: No depression, anxiety, mood swings, or difficulty sleeping  HEME/LYMPH: No easy bruising, or bleeding gums  All others negative    PHYSICAL EXAM:  T(C): 36.8 (04-05-22 @ 21:11), Max: 36.8 (04-05-22 @ 11:00)  HR: 96 (04-05-22 @ 21:11) (71 - 96)  BP: 154/69 (04-05-22 @ 21:11) (96/56 - 156/64)  RR: 18 (04-05-22 @ 21:11) (17 - 20)  SpO2: 91% (04-05-22 @ 21:11) (84% - 93%)  Wt(kg): --  I&O's Summary    04 Apr 2022 07:01  -  05 Apr 2022 07:00  --------------------------------------------------------  IN: 1120 mL / OUT: 1300 mL / NET: -180 mL    05 Apr 2022 07:01  -  06 Apr 2022 00:24  --------------------------------------------------------  IN: 904 mL / OUT: 150 mL / NET: 754 mL      GENERAL: NAD  EYES:   PERRLA   ENMT:   Moist mucous membranes, Good dentition, No lesions  Cardiovascular: Normal S1 S2, No JVD, No murmurs, No edema  Respiratory: scattered wheezing   Gastrointestinal:  Soft, Non-tender, + BS	  Extremities: right bka                               7.2    9.15  )-----------( 230      ( 05 Apr 2022 19:17 )             25.9     04-05    143  |  105  |  41<H>  ----------------------------<  196<H>  4.2   |  28  |  1.56<H>    Ca    8.6      05 Apr 2022 07:24  Phos  3.5     04-05  Mg     2.2     04-05      proBNP:   Lipid Profile:   HgA1c:   TSH:     Consultant(s) Notes Reviewed:  [x ] YES  [ ] NO    Care Discussed with Consultants/Other Providers [ x] YES  [ ] NO    Imaging Personally Reviewed independently:  [x] YES  [ ] NO    All labs, radiologic studies, vitals, orders and medications list reviewed. Patient is seen and examined at bedside. Case discussed with medical team.

## 2022-04-05 NOTE — PHYSICAL THERAPY INITIAL EVALUATION ADULT - ADDITIONAL COMMENTS
PTA pt has not ambulated, transfers stand pivot to/from bed to/from wheelchair wth his wife and son in laws assistance, wife sponge bathes patient, pt has a normal bed with a rail to assist with bed mobility, pt receives assistance for all mobility and ADLs. Pt owns rolling walker & wheelchair

## 2022-04-05 NOTE — PROGRESS NOTE ADULT - ASSESSMENT
86M with PMH of COPD (not on home o2), prior smoking history (40 pack years), HTN, HLD, Afib, CHF, T2DM, CVA, BPH, and PAD admitted for elective RLE AKA. Renal consulted for CKD Mx.    CKD 3, stable  baseline Cr ~1.7  k stable  Hypervolemic clinically  c/w losartan 100mg daily  off lasix and on metolazone  monitor BMP daily and u/o   dose all meds for eGFR  avoid NSAIDs/Nephrotoxics.    HYpernatremia- encourage free h20 intake  Hypokalemia -magnesium  stabke  HTN, controlled. bp optimal   continue ARB BB. monitor bp    Rt LE nonhealing wound:  Vascular/Cardio f/up noted.  ECHO  awaiting AKA  optimized renal stand point for OR. keep K ~4.0    Dr Knight  [phone]

## 2022-04-05 NOTE — PROGRESS NOTE ADULT - SUBJECTIVE AND OBJECTIVE BOX
Patient seen and examined  no complaints      No Known Allergies    Hospital Medications:   MEDICATIONS  (STANDING):  acetaminophen     Tablet .. 975 milliGRAM(s) Oral every 6 hours  albuterol/ipratropium for Nebulization 3 milliLiter(s) Nebulizer every 6 hours  aspirin  chewable 81 milliGRAM(s) Oral daily  atorvastatin 40 milliGRAM(s) Oral at bedtime  buDESOnide    Inhalation Suspension 0.5 milliGRAM(s) Inhalation every 12 hours  dextrose 5%. 1000 milliLiter(s) (100 mL/Hr) IV Continuous <Continuous>  dextrose 5%. 1000 milliLiter(s) (50 mL/Hr) IV Continuous <Continuous>  dextrose 50% Injectable 25 Gram(s) IV Push once  dextrose 50% Injectable 12.5 Gram(s) IV Push once  dextrose 50% Injectable 25 Gram(s) IV Push once  dextrose 50% Injectable 25 Gram(s) IV Push once  finasteride 5 milliGRAM(s) Oral daily  gabapentin 100 milliGRAM(s) Oral every 12 hours  glucagon  Injectable 1 milliGRAM(s) IntraMuscular once  glucagon  Injectable 1 milliGRAM(s) IntraMuscular once  heparin  Infusion 1200 Unit(s)/Hr (12 mL/Hr) IV Continuous <Continuous>  insulin glargine Injectable (LANTUS) 30 Unit(s) SubCutaneous at bedtime  insulin lispro (ADMELOG) corrective regimen sliding scale   SubCutaneous three times a day before meals  levothyroxine 25 MICROGram(s) Oral daily  losartan 100 milliGRAM(s) Oral daily  metolazone 2.5 milliGRAM(s) Oral <User Schedule>  metoprolol succinate ER 25 milliGRAM(s) Oral daily  montelukast 10 milliGRAM(s) Oral daily  multivitamin/minerals 1 Tablet(s) Oral daily  pantoprazole    Tablet 40 milliGRAM(s) Oral before breakfast  tamsulosin 0.8 milliGRAM(s) Oral at bedtime        VITALS:  T(F): 98.2 (04-05-22 @ 11:00), Max: 98.2 (04-05-22 @ 11:00)  HR: 71 (04-05-22 @ 11:00)  BP: 144/64 (04-05-22 @ 11:00)  RR: 18 (04-05-22 @ 11:00)  SpO2: 92% (04-05-22 @ 11:00)  Wt(kg): --    04-04 @ 07:01  -  04-05 @ 07:00  --------------------------------------------------------  IN: 1120 mL / OUT: 1300 mL / NET: -180 mL    04-05 @ 07:01  -  04-05 @ 12:40  --------------------------------------------------------  IN: 0 mL / OUT: 150 mL / NET: -150 mL      PHYSICAL EXAM:  Constitutional: NAD  HEENT: anicteric sclera  Neck: No JVD  Respiratory: CTAB, no wheezes, rales or rhonchi  Cardiovascular: S1, S2, RRR  Gastrointestinal: BS+, soft, NT/ND  Extremities: 3+ peripheral edema b/l; Rt foot dressing+   Neurological: A/O x 3, no focal deficits  Psychiatric: Normal mood, normal affect  : No hudson    LABS:  04-05    143  |  105  |  41<H>  ----------------------------<  196<H>  4.2   |  28  |  1.56<H>    Ca    8.6      05 Apr 2022 07:24  Phos  3.5     04-05  Mg     2.2     04-05      Creatinine Trend: 1.56 <--, 1.52 <--, 1.48 <--, 1.33 <--                        7.4    7.49  )-----------( 237      ( 05 Apr 2022 07:24 )             26.2     Urine Studies:      RADIOLOGY & ADDITIONAL STUDIES:

## 2022-04-05 NOTE — PHYSICAL THERAPY INITIAL EVALUATION ADULT - PERTINENT HX OF CURRENT PROBLEM, REHAB EVAL
86 year old man with HTN HLD DM and nonhealing wounds of RLE who is non-ambulatory at home. Now s/p R BKA on 04/05

## 2022-04-05 NOTE — PROGRESS NOTE ADULT - ASSESSMENT
Patient is a 86y old  Male who presents with a chief complaint of Preoperative Planning for Right above knee amputation.    Rt LE nonhealing wound:    Vascular/Cardio f/up noted.  S/p Rt AKA    A Fib:    SQ Heparin  Cw Metoprolol    COPD:    Cw Duoneb  Pulmicort       Patient is a 86y old  Male who presents with a chief complaint of Preoperative Planning for Right above knee amputation.    Rt LE nonhealing wound:    Vascular/Cardio f/up noted.  S/p Rt AKA    A Fib:    IV Heparin  Cw Metoprolol    COPD:    Cw Duoneb  Pulmicort

## 2022-04-05 NOTE — PHYSICAL THERAPY INITIAL EVALUATION ADULT - RANGE OF MOTION EXAMINATION, REHAB EVAL
except LLE limited hip flex, knee ext 2/2 residual weakness from prior CVAs, limited L ankle DF/bilateral upper extremity ROM was WFL (within functional limits)/bilateral lower extremity ROM was WFL (within functional limits)

## 2022-04-05 NOTE — PHYSICAL THERAPY INITIAL EVALUATION ADULT - IMPAIRMENTS FOUND, PT EVAL
COVID-19 Test Result   Telephone Encounter    Patient Name: Maya Fields   : 1988   MRN: [id number]     COVID19   Date Value Ref Range Status   2020 Not Detected Not Detected - Ref. Range Final        Patient was counseled as follows:  • (-) negative COVID-19 test result with or without symptoms   • The test is not perfect, so there is a chance it could be falsely negative or the virus level is too low for detection due to being very early in the infectious process.   • The optimal duration of home isolation is uncertain. The [place] Centers for Disease Control and Prevention (CDC) has issued recommendations on discontinuation of home isolation.   • For this reason, Maya is strongly encouraged to practice the safest standards in protecting their health and others given the current pandemic concerns. She is advised to:   o Practice social distancing in the community by staying at least 6 feet away from people   o Encouraged to use face mask while out in public   o Continue to wash their hands frequently with soap and hot water, and cover their mouth while coughing.   • If Maya is asymptomatic, she should self isolate for a total of 14 days from time of potential contact with Covid-19.   • If Maya is symptomatic then she may discontinue home isolation when the following criteria are met:   o At least seven days have passed since symptoms first appeared AND   o At least three days (72 hours) have passed since recovery of symptoms (defined as resolution of fever without the use of fever-reducing medications and improvement in respiratory symptoms [e.g., cough, shortness of breath])   • If Maya has been asymptomatic but then develops non-emergent symptoms such as mild increased shortness of breath, fever, cough, or for other questions, she  was asked to please call their primary care physician’s office or the [place] [facility] at [phone].   • Patient is feeling OK at time of telephone encounter.   She was asleep yesterday when I tried to contact her with results.  · Questions were engaged and answered to the best of my ability. She         expressed verbal understanding of their test results and my advice.    Primary Care Physician verified as being: Anand Zimmer MD      Electronically signed by MARGOT Fleming, 07/03/20, 8:46 AM.     aerobic capacity/endurance/gait, locomotion, and balance/muscle strength/posture/ROM aerobic capacity/endurance/gait, locomotion, and balance/integumentary integrity/muscle strength/posture/ROM

## 2022-04-05 NOTE — PHYSICAL THERAPY INITIAL EVALUATION ADULT - MANUAL MUSCLE TESTING RESULTS, REHAB EVAL
UE all grossly assessed to be at least 3+/5, LLE hip & knee 2/5, ankle DF 2-/5, PF 2/5, R hip flex2/5, knee ext 2/5/grossly assessed due to

## 2022-04-05 NOTE — CHART NOTE - NSCHARTNOTEFT_GEN_A_CORE
Pt seen for wheezing. Pt denies any sob beyond baseline. States that he has been wheezing since working with PT. Per family at bedside, pt baseline 90-94% at home. Went down to mid 80s oxygen saturation. NC requirements increasing from 2-4L. VS otherwise wnl. On exam, pt is in NAD, breathing on NC 4L, mild wheezing heard, no accessory muscle use; on auscultation, end expiratory wheezing heard throughout all lobes. In chronic COPD, likely baseline oxygen sat goal is 88-92%. Will get CXR. Will put on continuous pulse ox and tele (required for continuous pulse ox). If pt desaturates to less than goal, will increase oxygenation via HFNC and call Pulmonology.    ORESTES Barry, PGY-1  Vascular Surg  [id number]

## 2022-04-05 NOTE — PHYSICAL THERAPY INITIAL EVALUATION ADULT - MODALITIES TREATMENT COMMENTS
pt regine VAC application fair. pt flagged by SICU team for VAC application. pt rec'd in bed, premedicated by TANYA Montejo, seen w/ Vascular MD Barry. pt presents s/p R BKA 4/4. dressing rec'd C/D/I, no erythema, purulence, induration, or malodor. wound measured 8 x 7 x 2 cm. wound 30% slough, rest mixture of cauterized tissue vs pink tissue. wound cleansed w/ NS, cavilon to periwound, black foam w/ good seal at 125mmHg continuous at provider orders, secured via tape to anterior tibia, wrapped w/ my to secure. Pt left in bed, alarm, RN chana, NAD, all lines intact, cb in reach, all needs met/5Ps.

## 2022-04-05 NOTE — PROGRESS NOTE ADULT - ASSESSMENT
EKG -  A sense V paced PVC's  Echo - Mild LV dysfunction mild aortic stenosis    a/p     1) Preop clearance for AKA - pt has h/o moderate LV dysfunction as per echo 2017, no chest pains 2d echo now shows mild LV dysfunction  , 12 lead EKG ok,  PPM interrogated , s/p BKA , pt desaturating today and wheezing b/l f/u pulm recs can hold metoprolol if needed     2) HTN - continue losartan and metoprolol     3) Chronic systolic CHF - continue lasix, toprol and losartan     4) ?Atrial fib - coumadin on hold on IV heparin

## 2022-04-05 NOTE — PROGRESS NOTE ADULT - ASSESSMENT
Assessment: 86 year old man with HTN HLD DM and nonhealing wounds of RLE who is non-ambulatory at home. 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     Plan:                Vascular Surgery   [id number] Assessment: 86 year old man with HTN HLD DM and nonhealing wounds of RLE who is non-ambulatory at home. 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     Plan:  - SQH, baby ASA  - restart hep gtt  - dressing takedown Wednesday 4/6  - AKA planning next week  - pain control  - Reg diet  - AM labs      Vascular Surgery   [id number] Assessment: 86 year old man with HTN HLD DM and nonhealing wounds of RLE who is non-ambulatory at home. 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     Plan:  - SQH, baby ASA  - restart hep gtt  - dressing takedown Wednesday 4/6  - AKA planning next week  - pain control  - Reg diet  - AM labs  - PT eval      Vascular Surgery   [id number]

## 2022-04-05 NOTE — PHYSICAL THERAPY INITIAL EVALUATION ADULT - LIVES WITH, PROFILE
Pt lives with his daughter and her family and his wife in a 1 level home with ramp entry/children/spouse

## 2022-04-06 NOTE — DIETITIAN INITIAL EVALUATION ADULT - PERTINENT LABORATORY DATA
04-06    144  |  107  |  49<H>  ----------------------------<  152<H>  4.4   |  29  |  1.68<H>    Ca    8.7      06 Apr 2022 08:51  Phos  3.4     04-06  Mg     2.4     04-06    POCT Blood Glucose.: 268 mg/dL (04-06-22 @ 13:38)  A1C with Estimated Average Glucose Result: 6.8 % (04-02-22 @ 12:21)  A1C with Estimated Average Glucose Result: 7.4 % (09-03-21 @ 19:03)

## 2022-04-06 NOTE — CONSULT NOTE ADULT - SUBJECTIVE AND OBJECTIVE BOX
[facility] - [facility] - CONSULT NOTE    HPI: 86 year old gentleman, former smoker, followed by Dr. Alicja Rob of our practice for asthma/COPD overlap  syndrome. He has been stable from a pulmonary perspective and maintained on budesonide and duoneb once daily adn if needed. He also has a history of HTN, HLD, DM, CKD, CVA, CHF (mild systolic dysfunction) and atrial fibrillation with sick sinus syndrome s/p pacemaker implantation. He has been followed for many months for chronic foot wounds and leg pain now with some purulence and gangrene. The pain is exacerbated when laying in bed and improves with tylenol and when hanging the legs off of the bed. He is no longer able to ambulate. 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 The patient has no shortness of breath or hypoxemia on room air. He has a cough productive of scant sputum. He has chest congestion and wheeze. No fevers, chills or sweats. No chest pain/pressure or palpitations. Called by the patient's family and asked to be involved with his pulmonary care josh-operatively.    PMHX:  Chronic Obstructive Pulmonary Disease (COPD)  Asthma  Mycobacterium Avium-Intracellulare Infection  Obesity (mild)  Obstructive Sleep Apnea  Deep Vein Thrombosis (DVT)  Atrial fibrillation  Sick sinus syndrome  Hypertension  Hyperlipidemia  Diabetes Mellitus  Chronic kidney disease  CVA (Cerebral Vascular Accident) x 2  CHF (congestive heart failure)  BPH (benign prostatic hypertrophy)  GERD (gastroesophageal reflux disease)  Hernia  Biliary stones    PSHX:  Hernia Repair  Cataract surgery  ERCP for biliary stent  Pacemaker implanation    FAMILY HISTORY:  no pertinent past medical history in first degree relatives    SOCIAL HISTORY:  former smoker    Pulmonary Medications:   albuterol/ipratropium for Nebulization 3 milliLiter(s) Nebulizer every 6 hours  buDESOnide    Inhalation Suspension 0.5 milliGRAM(s) Inhalation every 12 hours  montelukast 10 milliGRAM(s) Oral daily    Antimicrobials:      Cardiology:  losartan 100 milliGRAM(s) Oral daily  metolazone 2.5 milliGRAM(s) Oral <User Schedule>  metoprolol succinate ER 25 milliGRAM(s) Oral daily  tamsulosin 0.8 milliGRAM(s) Oral at bedtime      Other:  acetaminophen     Tablet .. 975 milliGRAM(s) Oral every 6 hours  aspirin  chewable 81 milliGRAM(s) Oral daily  atorvastatin 40 milliGRAM(s) Oral at bedtime  dextrose 5%. 1000 milliLiter(s) IV Continuous <Continuous>  dextrose 5%. 1000 milliLiter(s) IV Continuous <Continuous>  dextrose 50% Injectable 25 Gram(s) IV Push once  dextrose 50% Injectable 12.5 Gram(s) IV Push once  dextrose 50% Injectable 25 Gram(s) IV Push once  dextrose 50% Injectable 25 Gram(s) IV Push once  finasteride 5 milliGRAM(s) Oral daily  gabapentin 300 milliGRAM(s) Oral every 8 hours  glucagon  Injectable 1 milliGRAM(s) IntraMuscular once  glucagon  Injectable 1 milliGRAM(s) IntraMuscular once  heparin  Infusion 1200 Unit(s)/Hr IV Continuous <Continuous>  insulin glargine Injectable (LANTUS) 30 Unit(s) SubCutaneous at bedtime  insulin lispro (ADMELOG) corrective regimen sliding scale   SubCutaneous three times a day before meals  levothyroxine 25 MICROGram(s) Oral daily  multivitamin/minerals 1 Tablet(s) Oral daily  pantoprazole    Tablet 40 milliGRAM(s) Oral before breakfast      Prn:  MEDICATIONS  (PRN):  dextrose Oral Gel 15 Gram(s) Oral once PRN Blood Glucose LESS THAN 70 milliGRAM(s)/deciliter  dextrose Oral Gel 15 Gram(s) Oral once PRN Blood Glucose LESS THAN 70 milliGRAM(s)/deciliter  HYDROmorphone  Injectable 0.5 milliGRAM(s) IV Push daily PRN AM dressing change  oxyCODONE    IR 5 milliGRAM(s) Oral every 4 hours PRN Severe Pain (7 - 10)  traMADol 50 milliGRAM(s) Oral every 12 hours PRN Moderate Pain (4 - 6)      Allergies    No Known Allergies    Intolerances        HOME MEDICATIONS: see  H & P    REVIEW OF SYSTEMS:  Constitutional: As per HPI  HEENT: Within normal limits  CV: As per HPI  Resp: As per HPI  GI: Within normal limits   : Within normal limits  Musculoskeletal: Within normal limits  Skin: Within normal limits  Neurological: Within normal limits  Psychiatric: Within normal limits  Endocrine: Within normal limits  Hematologic/Lymphatic: Within normal limits  Allergic/Immunologic: Within normal limits    [x] All other systems negative    OBJECTIVE:    Daily Weight in k.1 (2022 10:12)    POCT Blood Glucose.: 278 mg/dL (2022 17:55)  POCT Blood Glucose.: 268 mg/dL (2022 13:38)  POCT Blood Glucose.: 147 mg/dL (2022 09:05)  POCT Blood Glucose.: 266 mg/dL (2022 21:20)      PHYSICAL EXAM:  ICU Vital Signs Last 24 Hrs  T(C): 37.2 (2022 17:05), Max: 37.2 (2022 10:12)  T(F): 99 (2022 17:05), Max: 99 (2022 10:12)  HR: 74 (2022 17:05) (74 - 96)  BP: 128/61 (2022 17:05) (106/55 - 154/69)  BP(mean): --  ABP: --  ABP(mean): --  RR: 18 (2022 17:05) (18 - 18)  SpO2: 98% (2022 17:05) (91% - 98%)    General: Awake. Alert. Cooperative. No distress. Appears stated age 	  HEENT:   Atraumatic. Normocephalic. Anicteric. Normal oral mucosa. PERRL. EOMI.  Neck: Supple. Trachea midline. Thyroid without enlargement/tenderness/nodules. No carotid bruit. No JVD.	  Cardiovascular: Regular rate and rhythm. S1 S2 normal. No murmurs, rubs or gallops.  Respiratory: Respirations unlabored. Clear to auscultation and percussion bilaterally. No curvature.  Abdomen: Soft. Non-tender. Non-distended. No organomegaly. No masses. Normal bowel sounds.  Extremities: Warm to touch. No clubbing or cyanosis. No pedal edema.  Pulses: 2+ peripheral pulses all extremities.	  Skin: Normal skin color. No rashes or lesions. No ecchymoses. No cyanosis. Warm to touch.  Lymph Nodes: Cervical, supraclavicular and axillary nodes normal  Neurological: Motor and sensory examination equal and normal. A and O x 3  Psychiatry: Appropriate mood and affect.      LABS:                          7.4    10.93 )-----------( 228      ( 2022 08:51 )             26.1     CBC    WBC  10.93 <==, 9.15 <==, 7.49 <==, 6.99 <==, 7.62 <==, 7.27 <==    Hemoglobin  7.4 <<==, 7.2 <<==, 7.4 <<==, 7.7 <<==, 8.7 <<==, 7.9 <<==    Hematocrit  26.1 <==, 25.9 <==, 26.2 <==, 26.5 <==, 30.2 <==, 26.7 <==    Platelets  228 <==, 230 <==, 237 <==, 250 <==, 265 <==, 246 <==      144  |  107  |  49<H>  ----------------------------<  152<H>    04-06  4.4   |  29  |  1.68<H>    LYTES    sodium  144 <==, 143 <==, 148 <==, 146 <==, 146 <==    potassium   4.4 <==, 4.2 <==, 3.9 <==, 3.5 <==, 3.4 <==    chloride  107 <==, 105 <==, 107 <==, 104 <==, 104 <==    carbon dioxide  29 <==, 28 <==, 28 <==, 32 <==, 29 <==    =============================================================================================  RENAL FUNCTION:    Creatinine:   1.68  <<==, 1.56  <<==, 1.52  <<==, 1.48  <<==, 1.33  <<==    BUN:   49 <==, 41 <==, 43 <==, 39 <==, 41 <==    ============================================================================================    calcium   8.7 <==, 8.6 <==, 9.0 <==, 9.2 <==, 9.3 <==    phos   3.4 <==, 3.5 <==, 3.3 <==, 3.0 <==, 3.1 <==    mag   2.4 <==, 2.2 <==, 2.2 <==, 2.1 <==, 2.0 <==    ============================================================================================  PT/INR - ( 2022 10:30 )   PT: 14.4 sec;   INR: 1.24 ratio       PTT - ( 2022 08:52 )  PTT:70.7 sec    < from: TTE with Doppler (w/Cont) (22 @ 15:07) >    Patient name: Martir Heart  YOB: 1935   Age: 86 (M)   MR#: [id number]  Study Date: 4/3/2022  Location: [address] [place], [place] [id number]: [facility]  Study quality: Technically difficult  Referring Physician: Anmol Quinn MD  BloodPressure: 115/70 mmHg  Height: 173 cm  Weight: 92 kg  BSA: 2.1 m2  ------------------------------------------------------------------------  PROCEDURE: Transthoracic echocardiogram with 2-D, M-Mode  and complete spectral and color flow Doppler. Verbal  consent was obtained for injection of  Ultrasonic Enhancing  Agent following a discussion of risks and benefits.  Following intravenous injection of Ultrasonic Enhancing  Agent, harmonic imaging was performed.  INDICATION: Cardiomyopathy, unspecified (I42.9)  ------------------------------------------------------------------------  Dimensions:    Normal Values:  LA:     3.1    2.0 - 4.0 cm  Ao:     3.5    2.0 - 3.8 cm  SEPTUM: 1.1    0.6 - 1.2 cm  PWT:    1.3    0.6 - 1.1 cm  LVIDd:  4.3    3.0- 5.6 cm  LVIDs:  3.2    1.8 - 4.0 cm  Derived variables:  LVMI: 90 g/m2  RWT: 0.60  Fractional short: 26 %  EF (Visual Estimate): NWV %  Doppler Peak Velocity (m/sec): MV=1.6 AoV=1.4  ------------------------------------------------------------------------  Observations:  Mitral Valve: Mitral valve not well visualized. Mitral  annular calcification. Peak mitral valve gradient equals 10  mm Hg, mean transmitral valve gradient equals 4 mm Hg,  consistent with mild mitral stenosis.  Aortic Valve/Aorta: Aortic valve not well visualized;  appears calcified. Peak transaortic valve gradient equals 8  mm Hg, mean transaortic valve gradient equals 3 mm Hg,  estimated aortic valve area equals 2 sqcm (by continuity  equation), aortic valve velocity time integral equals 22  cm, consistent with mild aortic stenosis. Peak left  ventricular outflow tract gradient equals 3 mm Hg, mean  gradient is equal to 1 mm Hg, LVOT velocity time integral  equals 12 cm.  Aortic Root: 3.5 cm.  Left Atrium: Normal left atrium.  Left Ventricle: Endocardial visualization enhanced with  intravenous injection of Ultrasonic Enhancing Agent  (Definity). Mild left ventricular systolic dysfunction. The  inferior wall, and the inferoseptum are hypokinetic.  Normal left ventricular internal dimensions and wall  thicknesses. Unable to evaluate diastology.  Right Heart: A device wire is noted in the right heart. The  right ventricle is not well visualized; grossly normal  right ventricular systolic function. Tricuspid valve not  well visualized. Pulmonic valve not well visualized,  probably normal.  Pericardium/Pleura: Normal pericardium with trace  pericardial effusion.  Hemodynamic: Estimated right atrial pressure is 8 mm Hg.  ------------------------------------------------------------------------  Conclusions:  1. Aortic valve not well visualized; appears calcified.  Peak transaortic valve gradient equals 8 mm Hg, mean  transaortic valve gradient equals 3 mm Hg, estimated aortic  valve area equals 2 sqcm (by continuity equation), aortic  valve velocity time integral equals 22 cm, consistent with  mild aortic stenosis.  2. Endocardial visualization enhanced with intravenous  injection of Ultrasonic Enhancing Agent (Definity). Mild  left ventricular systolic dysfunction. The inferior wall,  and the inferoseptum are hypokinetic.  3. The right ventricle is not well visualized; grossly  normal right ventricular systolic function.  ------------------------------------------------------------------------  Confirmed on  4/3/2022 - 17:12:14 by BRITTANY Giraldo  ------------------------------------------------------------------------  --------------------------------------------------------------------------------------------------------------  ---------------------------------------------------------------------------------------------------------------  MICROBIOLOGY:     COVID-19 PCR . (22 @ 18:23)   COVID-19 PCR: NotDetec:       RADIOLOGY:  [x ] Chest radiographs reviewed and interpreted by me    EXAM:  XR CHEST PORTABLE URGENT 1V                          PROCEDURE DATE:  2022      FINDINGS:  Left chest wall dual-lead pacemaker.  The heart is normal in size.  The lungs are clear.  There is no pneumothorax or pleural effusion.    IMPRESSION:  Clear lungs.    KENA CARRINGTON MD; Resident Radiologist  This document has been electronically signed.  SATURNINO CHERRY MD; Attending Radiologist  This document has been electronically signed. 2022 11:40AM  ---------------------------------------------------------------------------------------------------------------  EXAM:  XR CHEST PORTABLE URGENT 1V                          PROCEDURE DATE:  2022      FINDINGS:    Appropriate course of dual-chamber pacemaker. Unremarkable   cardiomediastinal silhouette. Minimal left basilar atelectasis. No   pleural effusion or pneumothorax.      IMPRESSION:    Mild left basilar atelectasis.    JOSEPH GAMINO M.D., ATTENDING RADIOGIST  This document has been electronically signed. Apr  3 2022  9:00AM  ---------------------------------------------------------------------------------------------------------------           [facility] - [facility] - CONSULT NOTE    HPI: 86 year old gentleman, former smoker, followed by Dr. Alicja Rob of our practice for asthma/COPD overlap  syndrome and obstructive sleep apnea being treated conservatively. He has no history of TOM colonization/infection as listed in the "past medical history". He has been stable from a pulmonary perspective and maintained on budesonide and duoneb once daily and if needed. He also has a history of HTN, HLD, DM, CKD, CVA, CHF (mild systolic dysfunction) and atrial fibrillation with sick sinus syndrome s/p pacemaker implantation. He has been followed for many months for chronic foot wounds and leg pain now with some purulence and gangrene. The pain is exacerbated when laying in bed and improves with tylenol and when hanging the legs off of the bed. He is no longer able to ambulate. 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 The patient has developed marked shortness of breath with severe hypoxemia currently requiring a nasal canula @ 5lpm to maintain saturation @ 90%. He has a cough with copious mucous in his chest which he is unable to expectorate. He has chest congestion and wheeze. No fevers, chills or sweats. No chest pain/pressure or palpitations. Called by the patient's family and asked to be involved with his pulmonary care.    PMHX:  Chronic Obstructive Pulmonary Disease (COPD)  Asthma  Mycobacterium Avium-Intracellulare Infection (?)  Obesity (mild)  Obstructive Sleep Apnea  Deep Vein Thrombosis (DVT)  Atrial fibrillation  Sick sinus syndrome  Hypertension  Hyperlipidemia  Diabetes Mellitus  Chronic kidney disease  CVA (Cerebral Vascular Accident) x 2  CHF (congestive heart failure)  BPH (benign prostatic hypertrophy)  GERD (gastroesophageal reflux disease)  Hernia  Biliary stones    PSHX:  Hernia Repair  Cataract surgery  ERCP for biliary stent  Pacemaker implanation    FAMILY HISTORY:  no pertinent past medical history in first degree relatives    SOCIAL HISTORY:  former smoker;  - lives with his wife    Pulmonary Medications:   albuterol/ipratropium for Nebulization 3 milliLiter(s) Nebulizer every 6 hours  buDESOnide    Inhalation Suspension 0.5 milliGRAM(s) Inhalation every 12 hours  montelukast 10 milliGRAM(s) Oral daily    Antimicrobials:      Cardiology:  losartan 100 milliGRAM(s) Oral daily  metolazone 2.5 milliGRAM(s) Oral <User Schedule>  metoprolol succinate ER 25 milliGRAM(s) Oral daily  tamsulosin 0.8 milliGRAM(s) Oral at bedtime      Other:  acetaminophen     Tablet .. 975 milliGRAM(s) Oral every 6 hours  aspirin  chewable 81 milliGRAM(s) Oral daily  atorvastatin 40 milliGRAM(s) Oral at bedtime  dextrose 5%. 1000 milliLiter(s) IV Continuous <Continuous>  dextrose 5%. 1000 milliLiter(s) IV Continuous <Continuous>  dextrose 50% Injectable 25 Gram(s) IV Push once  dextrose 50% Injectable 12.5 Gram(s) IV Push once  dextrose 50% Injectable 25 Gram(s) IV Push once  dextrose 50% Injectable 25 Gram(s) IV Push once  finasteride 5 milliGRAM(s) Oral daily  gabapentin 300 milliGRAM(s) Oral every 8 hours  glucagon  Injectable 1 milliGRAM(s) IntraMuscular once  glucagon  Injectable 1 milliGRAM(s) IntraMuscular once  heparin  Infusion 1200 Unit(s)/Hr IV Continuous <Continuous>  insulin glargine Injectable (LANTUS) 30 Unit(s) SubCutaneous at bedtime  insulin lispro (ADMELOG) corrective regimen sliding scale   SubCutaneous three times a day before meals  levothyroxine 25 MICROGram(s) Oral daily  multivitamin/minerals 1 Tablet(s) Oral daily  pantoprazole    Tablet 40 milliGRAM(s) Oral before breakfast      Prn:  MEDICATIONS  (PRN):  dextrose Oral Gel 15 Gram(s) Oral once PRN Blood Glucose LESS THAN 70 milliGRAM(s)/deciliter  dextrose Oral Gel 15 Gram(s) Oral once PRN Blood Glucose LESS THAN 70 milliGRAM(s)/deciliter  HYDROmorphone  Injectable 0.5 milliGRAM(s) IV Push daily PRN AM dressing change  oxyCODONE    IR 5 milliGRAM(s) Oral every 4 hours PRN Severe Pain (7 - 10)  traMADol 50 milliGRAM(s) Oral every 12 hours PRN Moderate Pain (4 - 6)    Allergies    No Known Allergies    HOME MEDICATIONS: see  H & P    REVIEW OF SYSTEMS:  Constitutional: As per HPI  HEENT: Within normal limits  CV: As per HPI  Resp: As per HPI  GI: Within normal limits   : Within normal limits  Musculoskeletal: s/p right BKA  Skin: right foot gangrene  Neurological: Within normal limits  Psychiatric: Within normal limits  Endocrine: Within normal limits  Hematologic/Lymphatic: Within normal limits  Allergic/Immunologic: Within normal limits    [x] All other systems negative    OBJECTIVE:    Daily Weight in k.1 (2022 10:12)    POCT Blood Glucose.: 278 mg/dL (2022 17:55)  POCT Blood Glucose.: 268 mg/dL (2022 13:38)  POCT Blood Glucose.: 147 mg/dL (2022 09:05)  POCT Blood Glucose.: 266 mg/dL (2022 21:20)      PHYSICAL EXAM:  ICU Vital Signs Last 24 Hrs  T(C): 37.2 (2022 17:05), Max: 37.2 (2022 10:12)  T(F): 99 (2022 17:05), Max: 99 (2022 10:12)  HR: 74 (2022 17:05) (74 - 96)  BP: 128/61 (2022 17:05) (106/55 - 154/69)  BP(mean): --  ABP: --  ABP(mean): --  RR: 18 (2022 17:05) (18 - 18)  SpO2: 98% (2022 17:05) 87% on 4lpm nasal canula -> 90% on 5lpm nasal canula    General: Awake. Alert. Cooperative. Tachypneic. Appears stated age. Audible wheeze. Obese  HEENT: Atraumatic. Normocephalic. Anicteric. Normal oral mucosa. PERRL. EOMI.  Neck: Supple. Trachea midline. Thyroid without enlargement/tenderness/nodules. No carotid bruit. No JVD.	  Cardiovascular: Regular rate and rhythm. Distant S1 S2. No murmurs, rubs or gallops.  Respiratory: Using accessory muscles of respiration. Diffuse rhonchi and wheeze. No curvature.  Abdomen: Soft. Non-tender. Non-distended. No organomegaly. No masses. Normal bowel sounds.  Extremities: Warm to touch. No clubbing or cyanosis. No pedal edema. Right BKA  Pulses: Decreased peripheral pulses LLE  Skin: Venous stasis changes left lower extremity  Lymph Nodes: Cervical, supraclavicular and axillary nodes normal  Neurological: Motor and sensory examination equal and normal. A and O x 3  Psychiatry: Appropriate mood and affect.      LABS:                          7.4    10.93 )-----------( 228      ( 2022 08:51 )             26.1     CBC    WBC  10.93 <==, 9.15 <==, 7.49 <==, 6.99 <==, 7.62 <==, 7.27 <==    Hemoglobin  7.4 <<==, 7.2 <<==, 7.4 <<==, 7.7 <<==, 8.7 <<==, 7.9 <<==    Hematocrit  26.1 <==, 25.9 <==, 26.2 <==, 26.5 <==, 30.2 <==, 26.7 <==    Platelets  228 <==, 230 <==, 237 <==, 250 <==, 265 <==, 246 <==      144  |  107  |  49<H>  ----------------------------<  152<H>      4.4   |  29  |  1.68<H>    LYTES    sodium  144 <==, 143 <==, 148 <==, 146 <==, 146 <==    potassium   4.4 <==, 4.2 <==, 3.9 <==, 3.5 <==, 3.4 <==    chloride  107 <==, 105 <==, 107 <==, 104 <==, 104 <==    carbon dioxide  29 <==, 28 <==, 28 <==, 32 <==, 29 <==    =============================================================================================  RENAL FUNCTION:    Creatinine:   1.68  <<==, 1.56  <<==, 1.52  <<==, 1.48  <<==, 1.33  <<==    BUN:   49 <==, 41 <==, 43 <==, 39 <==, 41 <==    ============================================================================================    calcium   8.7 <==, 8.6 <==, 9.0 <==, 9.2 <==, 9.3 <==    phos   3.4 <==, 3.5 <==, 3.3 <==, 3.0 <==, 3.1 <==    mag   2.4 <==, 2.2 <==, 2.2 <==, 2.1 <==, 2.0 <==    ============================================================================================  PT/INR - ( 2022 10:30 )   PT: 14.4 sec;   INR: 1.24 ratio       PTT - ( 2022 08:52 )  PTT:70.7 sec    < from: TTE with Doppler (w/Cont) (22 @ 15:07) >    Patient name: Martir Heart  YOB: 1935   Age: 86 (M)   MR#: [id number]  Study Date: 4/3/2022  Location: [address] [place], [place] [id number]: Angie Olivarez RDCS  Study quality: Technically difficult  Referring Physician: Anmol Quinn MD  BloodPressure: 115/70 mmHg  Height: 173 cm  Weight: 92 kg  BSA: 2.1 m2  ------------------------------------------------------------------------  PROCEDURE: Transthoracic echocardiogram with 2-D, M-Mode  and complete spectral and color flow Doppler. Verbal  consent was obtained for injection of  Ultrasonic Enhancing  Agent following a discussion of risks and benefits.  Following intravenous injection of Ultrasonic Enhancing  Agent, harmonic imaging was performed.  INDICATION: Cardiomyopathy, unspecified (I42.9)  ------------------------------------------------------------------------  Dimensions:    Normal Values:  LA:     3.1    2.0 - 4.0 cm  Ao:     3.5    2.0 - 3.8 cm  SEPTUM: 1.1    0.6 - 1.2 cm  PWT:    1.3    0.6 - 1.1 cm  LVIDd:  4.3    3.0- 5.6 cm  LVIDs:  3.2    1.8 - 4.0 cm  Derived variables:  LVMI: 90 g/m2  RWT: 0.60  Fractional short: 26 %  EF (Visual Estimate): NWV %  Doppler Peak Velocity (m/sec): MV=1.6 AoV=1.4  ------------------------------------------------------------------------  Observations:  Mitral Valve: Mitral valve not well visualized. Mitral  annular calcification. Peak mitral valve gradient equals 10  mm Hg, mean transmitral valve gradient equals 4 mm Hg,  consistent with mild mitral stenosis.  Aortic Valve/Aorta: Aortic valve not well visualized;  appears calcified. Peak transaortic valve gradient equals 8  mm Hg, mean transaortic valve gradient equals 3 mm Hg,  estimated aortic valve area equals 2 sqcm (by continuity  equation), aortic valve velocity time integral equals 22  cm, consistent with mild aortic stenosis. Peak left  ventricular outflow tract gradient equals 3 mm Hg, mean  gradient is equal to 1 mm Hg, LVOT velocity time integral  equals 12 cm.  Aortic Root: 3.5 cm.  Left Atrium: Normal left atrium.  Left Ventricle: Endocardial visualization enhanced with  intravenous injection of Ultrasonic Enhancing Agent  (Definity). Mild left ventricular systolic dysfunction. The  inferior wall, and the inferoseptum are hypokinetic.  Normal left ventricular internal dimensions and wall  thicknesses. Unable to evaluate diastology.  Right Heart: A device wire is noted in the right heart. The  right ventricle is not well visualized; grossly normal  right ventricular systolic function. Tricuspid valve not  well visualized. Pulmonic valve not well visualized,  probably normal.  Pericardium/Pleura: Normal pericardium with trace  pericardial effusion.  Hemodynamic: Estimated right atrial pressure is 8 mm Hg.  ------------------------------------------------------------------------  Conclusions:  1. Aortic valve not well visualized; appears calcified.  Peak transaortic valve gradient equals 8 mm Hg, mean  transaortic valve gradient equals 3 mm Hg, estimated aortic  valve area equals 2 sqcm (by continuity equation), aortic  valve velocity time integral equals 22 cm, consistent with  mild aortic stenosis.  2. Endocardial visualization enhanced with intravenous  injection of Ultrasonic Enhancing Agent (Definity). Mild  left ventricular systolic dysfunction. The inferior wall,  and the inferoseptum are hypokinetic.  3. The right ventricle is not well visualized; grossly  normal right ventricular systolic function.  ------------------------------------------------------------------------  Confirmed on  4/3/2022 - 17:12:14 by BRITTANY Giraldo  ------------------------------------------------------------------------  --------------------------------------------------------------------------------------------------------------  ---------------------------------------------------------------------------------------------------------------  MICROBIOLOGY:     COVID-19 PCR . (22 @ 18:23)   COVID-19 PCR: NotDetec:       RADIOLOGY:  [x ] Chest radiographs reviewed and interpreted by me    EXAM:  XR CHEST PORTABLE URGENT 1V                          PROCEDURE DATE:  2022      FINDINGS:  Left chest wall dual-lead pacemaker.  The heart is normal in size.  The lungs are clear.  There is no pneumothorax or pleural effusion.    IMPRESSION:  Clear lungs.    KENA CARRINGTON MD; Resident Radiologist  This document has been electronically signed.  SATURNINO CHERRY MD; Attending Radiologist  This document has been electronically signed. 2022 11:40AM  ---------------------------------------------------------------------------------------------------------------  EXAM:  XR CHEST PORTABLE URGENT 1V                          PROCEDURE DATE:  2022      FINDINGS:    Appropriate course of dual-chamber pacemaker. Unremarkable   cardiomediastinal silhouette. Minimal left basilar atelectasis. No   pleural effusion or pneumothorax.      IMPRESSION:    Mild left basilar atelectasis.    JOSEPH GAMINO M.D., ATTENDING RADIOGIST  This document has been electronically signed. Apr  3 2022  9:00AM  ---------------------------------------------------------------------------------------------------------------

## 2022-04-06 NOTE — PROGRESS NOTE ADULT - SUBJECTIVE AND OBJECTIVE BOX
Vascular Surgery Progress Note    INTERVAL EVENTS:   No acute events overnight.    SUBJECTIVE: Patient seen and examined at bedside with surgical team    OBJECTIVE:    Vital Signs Last 24 Hrs  T(C): 37 (06 Apr 2022 01:02), Max: 37 (06 Apr 2022 01:02)  T(F): 98.6 (06 Apr 2022 01:02), Max: 98.6 (06 Apr 2022 01:02)  HR: 91 (06 Apr 2022 01:02) (71 - 96)  BP: 121/60 (06 Apr 2022 01:02) (96/56 - 156/64)  BP(mean): --  RR: 18 (06 Apr 2022 01:02) (17 - 20)  SpO2: 94% (06 Apr 2022 01:02) (84% - 94%)I&O's Detail    04 Apr 2022 07:01  -  05 Apr 2022 07:00  --------------------------------------------------------  IN:    Oral Fluid: 1120 mL  Total IN: 1120 mL    OUT:    Voided (mL): 1300 mL  Total OUT: 1300 mL    Total NET: -180 mL      05 Apr 2022 07:01  -  06 Apr 2022 03:06  --------------------------------------------------------  IN:    Heparin: 84 mL    Oral Fluid: 940 mL  Total IN: 1024 mL    OUT:    Voided (mL): 350 mL  Total OUT: 350 mL    Total NET: 674 mL      MEDICATIONS  (STANDING):  acetaminophen     Tablet .. 975 milliGRAM(s) Oral every 6 hours  albuterol/ipratropium for Nebulization 3 milliLiter(s) Nebulizer every 6 hours  aspirin  chewable 81 milliGRAM(s) Oral daily  atorvastatin 40 milliGRAM(s) Oral at bedtime  buDESOnide    Inhalation Suspension 0.5 milliGRAM(s) Inhalation every 12 hours  dextrose 5%. 1000 milliLiter(s) (100 mL/Hr) IV Continuous <Continuous>  dextrose 5%. 1000 milliLiter(s) (50 mL/Hr) IV Continuous <Continuous>  dextrose 50% Injectable 25 Gram(s) IV Push once  dextrose 50% Injectable 12.5 Gram(s) IV Push once  dextrose 50% Injectable 25 Gram(s) IV Push once  dextrose 50% Injectable 25 Gram(s) IV Push once  finasteride 5 milliGRAM(s) Oral daily  gabapentin 100 milliGRAM(s) Oral every 12 hours  glucagon  Injectable 1 milliGRAM(s) IntraMuscular once  glucagon  Injectable 1 milliGRAM(s) IntraMuscular once  heparin  Infusion 1200 Unit(s)/Hr (14 mL/Hr) IV Continuous <Continuous>  insulin glargine Injectable (LANTUS) 30 Unit(s) SubCutaneous at bedtime  insulin lispro (ADMELOG) corrective regimen sliding scale   SubCutaneous three times a day before meals  levothyroxine 25 MICROGram(s) Oral daily  losartan 100 milliGRAM(s) Oral daily  metolazone 2.5 milliGRAM(s) Oral <User Schedule>  metoprolol succinate ER 25 milliGRAM(s) Oral daily  montelukast 10 milliGRAM(s) Oral daily  multivitamin/minerals 1 Tablet(s) Oral daily  pantoprazole    Tablet 40 milliGRAM(s) Oral before breakfast  tamsulosin 0.8 milliGRAM(s) Oral at bedtime    MEDICATIONS  (PRN):  dextrose Oral Gel 15 Gram(s) Oral once PRN Blood Glucose LESS THAN 70 milliGRAM(s)/deciliter  dextrose Oral Gel 15 Gram(s) Oral once PRN Blood Glucose LESS THAN 70 milliGRAM(s)/deciliter  oxyCODONE    IR 5 milliGRAM(s) Oral every 4 hours PRN Severe Pain (7 - 10)  traMADol 50 milliGRAM(s) Oral every 12 hours PRN Moderate Pain (4 - 6)      PHYSICAL EXAM:  Constitutional:   Respiratory:   Abdomen:   Extremities:    LABS:                        7.2    9.15  )-----------( 230      ( 05 Apr 2022 19:17 )             25.9     04-05    143  |  105  |  41<H>  ----------------------------<  196<H>  4.2   |  28  |  1.56<H>    Ca    8.6      05 Apr 2022 07:24  Phos  3.5     04-05  Mg     2.2     04-05      PT/INR - ( 05 Apr 2022 10:30 )   PT: 14.4 sec;   INR: 1.24 ratio         PTT - ( 06 Apr 2022 02:15 )  PTT:66.6 sec          IMAGING:     INTERVAL EVENTS:   No acute events overnight.    SUBJECTIVE: Patient seen and examined at bedside with surgical team. Denies any new complaints.     OBJECTIVE:    Vital Signs Last 24 Hrs  T(C): 37 (06 Apr 2022 01:02), Max: 37 (06 Apr 2022 01:02)  T(F): 98.6 (06 Apr 2022 01:02), Max: 98.6 (06 Apr 2022 01:02)  HR: 91 (06 Apr 2022 01:02) (71 - 96)  BP: 121/60 (06 Apr 2022 01:02) (96/56 - 156/64)  BP(mean): --  RR: 18 (06 Apr 2022 01:02) (17 - 20)  SpO2: 94% (06 Apr 2022 01:02) (84% - 94%)I&O's Detail    04 Apr 2022 07:01  -  05 Apr 2022 07:00  --------------------------------------------------------  IN:    Oral Fluid: 1120 mL  Total IN: 1120 mL    OUT:    Voided (mL): 1300 mL  Total OUT: 1300 mL    Total NET: -180 mL      05 Apr 2022 07:01  -  06 Apr 2022 03:06  --------------------------------------------------------  IN:    Heparin: 84 mL    Oral Fluid: 940 mL  Total IN: 1024 mL    OUT:    Voided (mL): 350 mL  Total OUT: 350 mL    Total NET: 674 mL      MEDICATIONS  (STANDING):  acetaminophen     Tablet .. 975 milliGRAM(s) Oral every 6 hours  albuterol/ipratropium for Nebulization 3 milliLiter(s) Nebulizer every 6 hours  aspirin  chewable 81 milliGRAM(s) Oral daily  atorvastatin 40 milliGRAM(s) Oral at bedtime  buDESOnide    Inhalation Suspension 0.5 milliGRAM(s) Inhalation every 12 hours  dextrose 5%. 1000 milliLiter(s) (100 mL/Hr) IV Continuous <Continuous>  dextrose 5%. 1000 milliLiter(s) (50 mL/Hr) IV Continuous <Continuous>  dextrose 50% Injectable 25 Gram(s) IV Push once  dextrose 50% Injectable 12.5 Gram(s) IV Push once  dextrose 50% Injectable 25 Gram(s) IV Push once  dextrose 50% Injectable 25 Gram(s) IV Push once  finasteride 5 milliGRAM(s) Oral daily  gabapentin 100 milliGRAM(s) Oral every 12 hours  glucagon  Injectable 1 milliGRAM(s) IntraMuscular once  glucagon  Injectable 1 milliGRAM(s) IntraMuscular once  heparin  Infusion 1200 Unit(s)/Hr (14 mL/Hr) IV Continuous <Continuous>  insulin glargine Injectable (LANTUS) 30 Unit(s) SubCutaneous at bedtime  insulin lispro (ADMELOG) corrective regimen sliding scale   SubCutaneous three times a day before meals  levothyroxine 25 MICROGram(s) Oral daily  losartan 100 milliGRAM(s) Oral daily  metolazone 2.5 milliGRAM(s) Oral <User Schedule>  metoprolol succinate ER 25 milliGRAM(s) Oral daily  montelukast 10 milliGRAM(s) Oral daily  multivitamin/minerals 1 Tablet(s) Oral daily  pantoprazole    Tablet 40 milliGRAM(s) Oral before breakfast  tamsulosin 0.8 milliGRAM(s) Oral at bedtime    MEDICATIONS  (PRN):  dextrose Oral Gel 15 Gram(s) Oral once PRN Blood Glucose LESS THAN 70 milliGRAM(s)/deciliter  dextrose Oral Gel 15 Gram(s) Oral once PRN Blood Glucose LESS THAN 70 milliGRAM(s)/deciliter  oxyCODONE    IR 5 milliGRAM(s) Oral every 4 hours PRN Severe Pain (7 - 10)  traMADol 50 milliGRAM(s) Oral every 12 hours PRN Moderate Pain (4 - 6)      PHYSICAL EXAM:  Constitutional: NAD  Respiratory: breathing on 4L NC, slight wheeze persists, no accessory muscle use  Abdomen: Soft, nd, nttp, no g/r  Extremities: RLE amputation site clean, no purulence, few drops of discharge. Pain when leg was unwrapped.     LABS:                        7.2    9.15  )-----------( 230      ( 05 Apr 2022 19:17 )             25.9     04-05    143  |  105  |  41<H>  ----------------------------<  196<H>  4.2   |  28  |  1.56<H>    Ca    8.6      05 Apr 2022 07:24  Phos  3.5     04-05  Mg     2.2     04-05      PT/INR - ( 05 Apr 2022 10:30 )   PT: 14.4 sec;   INR: 1.24 ratio         PTT - ( 06 Apr 2022 02:15 )  PTT:66.6 sec          IMAGING:

## 2022-04-06 NOTE — CONSULT NOTE ADULT - ASSESSMENT
ASSESSMENT:    86 year old gentleman, former smoker, followed by Dr. Alicja Rob of our practice for asthma/COPD overlap  syndrome. He has been stable from a pulmonary perspective and maintained on budesonide and duoneb once daily adn if needed. He also has a history of HTN, HLD, DM, CKD, CVA, CHF (mild systolic dysfunction) and atrial fibrillation with sick sinus syndrome s/p pacemaker implantation. He has been followed for many months for chronic foot wounds and leg pain now with some purulence and gangrene. The pain is exacerbated when laying in bed and improves with tylenol and when hanging the legs off of the bed. He is no longer able to ambulate. 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 The patient has no shortness of breath or hypoxemia on room air. He has a cough productive of scant sputum. He has chest congestion and wheeze. No fevers, chills or sweats. No chest pain/pressure or palpitations. Called by the patient's family and asked to be involved with his pulmonary care josh-operatively. ASSESSMENT:    86 year old gentleman, former smoker, followed by Dr. Alicja Rob of our practice for asthma/COPD overlap  syndrome and obstructive sleep apnea being treated conservatively. He has no history of TOM colonization/infection as listed in the "past medical history". He has been stable from a pulmonary perspective and maintained on budesonide and duoneb once daily and if needed. He also has a history of HTN, HLD, DM, CKD, CVA, CHF (mild systolic dysfunction) and atrial fibrillation with sick sinus syndrome s/p pacemaker implantation. He has been followed for many months for chronic foot wounds and leg pain now with some purulence and gangrene. The pain is exacerbated when laying in bed and improves with tylenol and when hanging the legs off of the bed. He is no longer able to ambulate. 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 The patient has developed marked shortness of breath with severe hypoxemia currently requiring a nasal canula @ 5lpm to maintain saturation @ 90%. He has a cough with copious mucous in his chest which he is unable to expectorate. He has chest congestion and wheeze. No fevers, chills or sweats. No chest pain/pressure or palpitations. Called by the patient's family and asked to be involved with his pulmonary care.    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 limiting diaphragmatic excursion and respiratory muscle weakness decreasing chest wall expansion -> atelectasis  3) no evidence of pulmonary edema, pleural effusions or pneumonia on the most recent CXR    PLAN/RECOMMENDATIONS:    oxygen supplementation to keep saturation greater than 92% to avoid limb ischemia - may need a high flow nasal canula   ceftriaxone x 5 days  solumedrol 20mg IVP q6h - glucose control on steroids  albuterol/atrovent nebs q4h holding 2am dose  pulmicort 0.5mg nebs q12h - give after duoneb - rinse mouth after use  mucinex 1200mg 2 times daily  singulair 10mg @ bedtime  acapella device/incentive spirometer  cardiac meds: ASA/lipitor/losartan/toprol XL/metolazone  flomax/proscar  vascular surgery follow-up    needs to be further optimized prior to surgery    heparin gtt    needs better pain control  GI prophylaxis - protonix    Thank you for the courtesy of this referral. Plan of care discussed with the patient at bedside     Sarabjit Wright MD, [facility]  [phone]  Pulmonary Medicine       ASSESSMENT:    86 year old gentleman, former smoker, followed by Dr. Alicja Rob of our practice for asthma/COPD overlap  syndrome and obstructive sleep apnea being treated conservatively. He has no history of TOM colonization/infection as listed in the "past medical history". He has been stable from a pulmonary perspective and maintained on budesonide and duoneb once daily and if needed. He also has a history of HTN, HLD, DM, CKD, CVA, CHF (mild systolic dysfunction) and atrial fibrillation with sick sinus syndrome s/p pacemaker implantation. He has been followed for many months for chronic foot wounds and leg pain now with some purulence and gangrene. The pain is exacerbated when laying in bed and improves with tylenol and when hanging the legs off of the bed. He is no longer able to ambulate. 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 The patient has developed marked shortness of breath with severe hypoxemia currently requiring a nasal canula @ 5lpm to maintain saturation @ 90%. He has a cough with copious mucous in his chest which he is unable to expectorate. He has chest congestion and wheeze. No fevers, chills or sweats. No chest pain/pressure or palpitations. Called by the patient's family and asked to be involved with his pulmonary care.    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 limiting diaphragmatic excursion and respiratory muscle weakness decreasing chest wall expansion -> atelectasis  3) no evidence of pulmonary edema, pleural effusions or pneumonia on the most recent CXR    PLAN/RECOMMENDATIONS:    oxygen supplementation to keep saturation greater than 92% to avoid limb ischemia - may need a high flow nasal canula   no indication for diuresis especially in the setting of chronic kidney disease and euvolemia  ceftriaxone x 5 days  solumedrol 20mg IVP q6h - glucose control on steroids  albuterol/atrovent nebs q4h holding 2am dose  pulmicort 0.5mg nebs q12h - give after duoneb - rinse mouth after use  mucinex 1200mg 2 times daily  singulair 10mg @ bedtime  acapella device/incentive spirometer  cardiac meds: ASA/lipitor/losartan/toprol XL/metolazone  flomax/proscar  vascular surgery follow-up    needs to be further optimized prior to surgery    heparin gtt    needs better pain control  GI prophylaxis - protonix    Thank you for the courtesy of this referral. Plan of care discussed with the patient at bedside     Sarabjit Wright MD, [facility]  [phone]  Pulmonary Medicine

## 2022-04-06 NOTE — PROGRESS NOTE ADULT - ASSESSMENT
Assessment: 86 year old man with HTN HLD DM and nonhealing wounds of RLE who is non-ambulatory at home. 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     Plan:  - SQH, baby ASA  - Continue hep gtt, f/u PTT  - dressing takedown TODAY  - AKA planning next week  - pain control  - Reg diet  - AM labs  - PT eval      Vascular Surgery   [id number] Assessment: 86 year old man with HTN HLD DM and nonhealing wounds of RLE who is non-ambulatory at home. 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     - SQH, baby ASA  - Continue hep gtt, PTT therapeutic x2  - AKA planning next week  - pain control  - Reg diet  - PT eval post AKA    Vascular Surgery   [id number]

## 2022-04-06 NOTE — PROGRESS NOTE ADULT - SUBJECTIVE AND OBJECTIVE BOX
Patient is a 86y old  Male who presents with a chief complaint of Preoperative Planning for Right above knee amputation (06 Apr 2022 14:35)      SUBJECTIVE / OVERNIGHT EVENTS:    Events noted.  CONSTITUTIONAL: No fever,  or fatigue  RESPIRATORY: No cough, wheezing,  No shortness of breath  CARDIOVASCULAR: No chest pain, palpitations, dizziness, or leg swelling  GASTROINTESTINAL: No abdominal or epigastric pain. No nausea, vomiting.      MEDICATIONS  (STANDING):  acetaminophen     Tablet .. 975 milliGRAM(s) Oral every 6 hours  albuterol/ipratropium for Nebulization 3 milliLiter(s) Nebulizer every 6 hours  aspirin  chewable 81 milliGRAM(s) Oral daily  atorvastatin 40 milliGRAM(s) Oral at bedtime  buDESOnide    Inhalation Suspension 0.5 milliGRAM(s) Inhalation every 12 hours  dextrose 5%. 1000 milliLiter(s) (100 mL/Hr) IV Continuous <Continuous>  dextrose 5%. 1000 milliLiter(s) (50 mL/Hr) IV Continuous <Continuous>  dextrose 50% Injectable 25 Gram(s) IV Push once  dextrose 50% Injectable 12.5 Gram(s) IV Push once  dextrose 50% Injectable 25 Gram(s) IV Push once  dextrose 50% Injectable 25 Gram(s) IV Push once  finasteride 5 milliGRAM(s) Oral daily  gabapentin 300 milliGRAM(s) Oral every 8 hours  glucagon  Injectable 1 milliGRAM(s) IntraMuscular once  glucagon  Injectable 1 milliGRAM(s) IntraMuscular once  heparin  Infusion 1200 Unit(s)/Hr (14 mL/Hr) IV Continuous <Continuous>  insulin glargine Injectable (LANTUS) 30 Unit(s) SubCutaneous at bedtime  insulin lispro (ADMELOG) corrective regimen sliding scale   SubCutaneous three times a day before meals  levothyroxine 25 MICROGram(s) Oral daily  losartan 100 milliGRAM(s) Oral daily  metolazone 2.5 milliGRAM(s) Oral <User Schedule>  metoprolol succinate ER 25 milliGRAM(s) Oral daily  montelukast 10 milliGRAM(s) Oral daily  multivitamin/minerals 1 Tablet(s) Oral daily  pantoprazole    Tablet 40 milliGRAM(s) Oral before breakfast  tamsulosin 0.8 milliGRAM(s) Oral at bedtime    MEDICATIONS  (PRN):  dextrose Oral Gel 15 Gram(s) Oral once PRN Blood Glucose LESS THAN 70 milliGRAM(s)/deciliter  dextrose Oral Gel 15 Gram(s) Oral once PRN Blood Glucose LESS THAN 70 milliGRAM(s)/deciliter  HYDROmorphone  Injectable 0.5 milliGRAM(s) IV Push daily PRN AM dressing change  oxyCODONE    IR 5 milliGRAM(s) Oral every 4 hours PRN Severe Pain (7 - 10)  traMADol 50 milliGRAM(s) Oral every 12 hours PRN Moderate Pain (4 - 6)        CAPILLARY BLOOD GLUCOSE      POCT Blood Glucose.: 283 mg/dL (06 Apr 2022 21:19)  POCT Blood Glucose.: 278 mg/dL (06 Apr 2022 17:55)  POCT Blood Glucose.: 268 mg/dL (06 Apr 2022 13:38)  POCT Blood Glucose.: 147 mg/dL (06 Apr 2022 09:05)    I&O's Summary    05 Apr 2022 07:01  -  06 Apr 2022 07:00  --------------------------------------------------------  IN: 1312 mL / OUT: 450 mL / NET: 862 mL    06 Apr 2022 07:01  -  06 Apr 2022 21:54  --------------------------------------------------------  IN: 854 mL / OUT: 200 mL / NET: 654 mL        T(C): 37.1 (04-06-22 @ 21:39), Max: 37.2 (04-06-22 @ 10:12)  HR: 87 (04-06-22 @ 21:39) (74 - 92)  BP: 111/70 (04-06-22 @ 21:39) (106/55 - 128/61)  RR: 18 (04-06-22 @ 21:39) (18 - 18)  SpO2: 93% (04-06-22 @ 21:39) (91% - 98%)    PHYSICAL EXAM:    NECK: Supple, No JVD  CHEST/LUNG: Clear to auscultation bilaterally; No wheezing.  HEART: Regular rate and rhythm; No murmurs, rubs, or gallops  ABDOMEN: Soft, Nontender, Nondistended; Bowel sounds present  EXTREMITIES:   No edema  NEUROLOGY: AAO       LABS:                        7.4    10.93 )-----------( 228      ( 06 Apr 2022 08:51 )             26.1     04-06    144  |  107  |  49<H>  ----------------------------<  152<H>  4.4   |  29  |  1.68<H>    Ca    8.7      06 Apr 2022 08:51  Phos  3.4     04-06  Mg     2.4     04-06      PT/INR - ( 05 Apr 2022 10:30 )   PT: 14.4 sec;   INR: 1.24 ratio         PTT - ( 06 Apr 2022 08:52 )  PTT:70.7 sec        CAPILLARY BLOOD GLUCOSE      POCT Blood Glucose.: 283 mg/dL (06 Apr 2022 21:19)  POCT Blood Glucose.: 278 mg/dL (06 Apr 2022 17:55)  POCT Blood Glucose.: 268 mg/dL (06 Apr 2022 13:38)  POCT Blood Glucose.: 147 mg/dL (06 Apr 2022 09:05)        RADIOLOGY & ADDITIONAL TESTS:    Imaging Personally Reviewed:    Consultant(s) Notes Reviewed:      Care Discussed with Consultants/Other Providers:    Graham Pulliam MD, CMD, FACP    [id number] [id number]  Office Tel: [phone]  Cell: [phone]

## 2022-04-06 NOTE — PROGRESS NOTE ADULT - SUBJECTIVE AND OBJECTIVE BOX
Patient seen and examined  no complaints    No Known Allergies    Hospital Medications:   MEDICATIONS  (STANDING):  acetaminophen     Tablet .. 975 milliGRAM(s) Oral every 6 hours  albuterol/ipratropium for Nebulization 3 milliLiter(s) Nebulizer every 6 hours  aspirin  chewable 81 milliGRAM(s) Oral daily  atorvastatin 40 milliGRAM(s) Oral at bedtime  buDESOnide    Inhalation Suspension 0.5 milliGRAM(s) Inhalation every 12 hours  dextrose 5%. 1000 milliLiter(s) (100 mL/Hr) IV Continuous <Continuous>  dextrose 5%. 1000 milliLiter(s) (50 mL/Hr) IV Continuous <Continuous>  dextrose 50% Injectable 25 Gram(s) IV Push once  dextrose 50% Injectable 12.5 Gram(s) IV Push once  dextrose 50% Injectable 25 Gram(s) IV Push once  dextrose 50% Injectable 25 Gram(s) IV Push once  finasteride 5 milliGRAM(s) Oral daily  gabapentin 300 milliGRAM(s) Oral every 8 hours  glucagon  Injectable 1 milliGRAM(s) IntraMuscular once  glucagon  Injectable 1 milliGRAM(s) IntraMuscular once  heparin  Infusion 1200 Unit(s)/Hr (14 mL/Hr) IV Continuous <Continuous>  insulin glargine Injectable (LANTUS) 30 Unit(s) SubCutaneous at bedtime  insulin lispro (ADMELOG) corrective regimen sliding scale   SubCutaneous three times a day before meals  levothyroxine 25 MICROGram(s) Oral daily  losartan 100 milliGRAM(s) Oral daily  metolazone 2.5 milliGRAM(s) Oral <User Schedule>  metoprolol succinate ER 25 milliGRAM(s) Oral daily  montelukast 10 milliGRAM(s) Oral daily  multivitamin/minerals 1 Tablet(s) Oral daily  pantoprazole    Tablet 40 milliGRAM(s) Oral before breakfast  tamsulosin 0.8 milliGRAM(s) Oral at bedtime        VITALS:  T(F): 98.6 (04-06-22 @ 13:07), Max: 99 (04-06-22 @ 10:12)  HR: 86 (04-06-22 @ 13:07)  BP: 124/66 (04-06-22 @ 13:07)  RR: 18 (04-06-22 @ 13:07)  SpO2: 91% (04-06-22 @ 13:07)  Wt(kg): --    04-05 @ 07:01 - 04-06 @ 07:00  --------------------------------------------------------  IN: 1312 mL / OUT: 450 mL / NET: 862 mL    04-06 @ 07:01  -  04-06 @ 14:35  --------------------------------------------------------  IN: 480 mL / OUT: 200 mL / NET: 280 mL      PHYSICAL EXAM:  Constitutional: NAD  HEENT: anicteric sclera  Neck: No JVD  Respiratory: CTAB, no wheezes, rales or rhonchi  Cardiovascular: S1, S2, RRR  Gastrointestinal: BS+, soft, NT/ND  Extremities: 3+ peripheral edema b/l; Rt foot dressing+   Neurological: A/O x 3, no focal deficits  Psychiatric: Normal mood, normal affect  : No hudson    LABS:  04-06    144  |  107  |  49<H>  ----------------------------<  152<H>  4.4   |  29  |  1.68<H>    Ca    8.7      06 Apr 2022 08:51  Phos  3.4     04-06  Mg     2.4     04-06      Creatinine Trend: 1.68 <--, 1.56 <--, 1.52 <--, 1.48 <--, 1.33 <--                        7.4    10.93 )-----------( 228      ( 06 Apr 2022 08:51 )             26.1     Urine Studies:      RADIOLOGY & ADDITIONAL STUDIES:

## 2022-04-06 NOTE — DIETITIAN INITIAL EVALUATION ADULT - PERSON TAUGHT/METHOD
Discussed importance of PO intake and intake of adequate protein for wound healing. Reviewed sources of protein-rich foods. Discussed importance of BG control for wound healing./verbal instruction/patient instructed/daughter instructed

## 2022-04-06 NOTE — DIETITIAN INITIAL EVALUATION ADULT - ORAL INTAKE PTA/DIET HISTORY
Pt seen with daughter at bedside, reports good PO intake and appetite, consumes regular diet at home. Tries to limit intake of concentrated sweets. On coumadin at home. NKFA

## 2022-04-06 NOTE — DIETITIAN INITIAL EVALUATION ADULT - NS FNS DIET ORDER
Diet, Regular:   Consistent Carbohydrate {Evening Snack} (CSTCHOSN) (04-01-22 @ 17:53) [Active]

## 2022-04-06 NOTE — DIETITIAN INITIAL EVALUATION ADULT - PERTINENT MEDS FT
MEDICATIONS  (STANDING):  acetaminophen     Tablet .. 975 milliGRAM(s) Oral every 6 hours  albuterol/ipratropium for Nebulization 3 milliLiter(s) Nebulizer every 6 hours  aspirin  chewable 81 milliGRAM(s) Oral daily  atorvastatin 40 milliGRAM(s) Oral at bedtime  buDESOnide    Inhalation Suspension 0.5 milliGRAM(s) Inhalation every 12 hours  dextrose 5%. 1000 milliLiter(s) (100 mL/Hr) IV Continuous <Continuous>  dextrose 5%. 1000 milliLiter(s) (50 mL/Hr) IV Continuous <Continuous>  dextrose 50% Injectable 25 Gram(s) IV Push once  dextrose 50% Injectable 12.5 Gram(s) IV Push once  dextrose 50% Injectable 25 Gram(s) IV Push once  dextrose 50% Injectable 25 Gram(s) IV Push once  finasteride 5 milliGRAM(s) Oral daily  gabapentin 300 milliGRAM(s) Oral every 8 hours  glucagon  Injectable 1 milliGRAM(s) IntraMuscular once  glucagon  Injectable 1 milliGRAM(s) IntraMuscular once  heparin  Infusion 1200 Unit(s)/Hr (14 mL/Hr) IV Continuous <Continuous>  insulin glargine Injectable (LANTUS) 30 Unit(s) SubCutaneous at bedtime  insulin lispro (ADMELOG) corrective regimen sliding scale   SubCutaneous three times a day before meals  levothyroxine 25 MICROGram(s) Oral daily  losartan 100 milliGRAM(s) Oral daily  metolazone 2.5 milliGRAM(s) Oral <User Schedule>  metoprolol succinate ER 25 milliGRAM(s) Oral daily  montelukast 10 milliGRAM(s) Oral daily  multivitamin/minerals 1 Tablet(s) Oral daily  pantoprazole    Tablet 40 milliGRAM(s) Oral before breakfast  tamsulosin 0.8 milliGRAM(s) Oral at bedtime    MEDICATIONS  (PRN):  dextrose Oral Gel 15 Gram(s) Oral once PRN Blood Glucose LESS THAN 70 milliGRAM(s)/deciliter  dextrose Oral Gel 15 Gram(s) Oral once PRN Blood Glucose LESS THAN 70 milliGRAM(s)/deciliter  HYDROmorphone  Injectable 0.5 milliGRAM(s) IV Push daily PRN AM dressing change  oxyCODONE    IR 5 milliGRAM(s) Oral every 4 hours PRN Severe Pain (7 - 10)  traMADol 50 milliGRAM(s) Oral every 12 hours PRN Moderate Pain (4 - 6)

## 2022-04-06 NOTE — CONSULT NOTE ADULT - CONSULT REASON
pulmonary management josh right AKA; acute hypoxic respiratory failure; COPD exacerbation; mucous plugging; atelectasis; weak cough acute hypoxic respiratory failure; COPD exacerbation; mucous plugging; pleural effusion; atelectasis; weak cough

## 2022-04-06 NOTE — DIETITIAN INITIAL EVALUATION ADULT - ADD RECOMMEND
1) Continue current diet as tolerated. 2) Recommend addition of Glucerna BID to supplement PO intake. 3) Encourage PO intake of protein-rich foods. 4) Continue Multivitamin once daily, recommend addition of ascorbic acid daily 5) RD to remain available and follow-up as medically appropriate.

## 2022-04-06 NOTE — PROGRESS NOTE ADULT - ASSESSMENT
Patient is a 86y old  Male who presents with a chief complaint of Preoperative Planning for Right above knee amputation.    Rt LE nonhealing wound:    Vascular/Cardio f/up noted.  S/p Rt AKA    A Fib:    IV Heparin  Cw Metoprolol    COPD:    Cw Duoneb  Pulmicort

## 2022-04-06 NOTE — PROGRESS NOTE ADULT - SUBJECTIVE AND OBJECTIVE BOX
Ritesh Bell MD  Interventional Cardiology / Endovascular Specialist  [place] Office : [phone] [place] [address] [place] [address]  Tel:   [place] Office : [phone] [facility] [place] [address]  Tel: [phone]    Pt is lying in bed comfortable not in distress, no chest pains no SOB no palpitations  	  MEDICATIONS:  aspirin  chewable 81 milliGRAM(s) Oral daily  heparin  Infusion 1200 Unit(s)/Hr IV Continuous <Continuous>  losartan 100 milliGRAM(s) Oral daily  metolazone 2.5 milliGRAM(s) Oral <User Schedule>  metoprolol succinate ER 25 milliGRAM(s) Oral daily  tamsulosin 0.8 milliGRAM(s) Oral at bedtime      albuterol/ipratropium for Nebulization 3 milliLiter(s) Nebulizer every 6 hours  buDESOnide    Inhalation Suspension 0.5 milliGRAM(s) Inhalation every 12 hours  montelukast 10 milliGRAM(s) Oral daily    acetaminophen     Tablet .. 975 milliGRAM(s) Oral every 6 hours  gabapentin 300 milliGRAM(s) Oral every 8 hours  HYDROmorphone  Injectable 0.5 milliGRAM(s) IV Push daily PRN  oxyCODONE    IR 5 milliGRAM(s) Oral every 4 hours PRN  traMADol 50 milliGRAM(s) Oral every 12 hours PRN    pantoprazole    Tablet 40 milliGRAM(s) Oral before breakfast    atorvastatin 40 milliGRAM(s) Oral at bedtime  dextrose 50% Injectable 25 Gram(s) IV Push once  dextrose 50% Injectable 12.5 Gram(s) IV Push once  dextrose 50% Injectable 25 Gram(s) IV Push once  dextrose 50% Injectable 25 Gram(s) IV Push once  dextrose Oral Gel 15 Gram(s) Oral once PRN  dextrose Oral Gel 15 Gram(s) Oral once PRN  finasteride 5 milliGRAM(s) Oral daily  glucagon  Injectable 1 milliGRAM(s) IntraMuscular once  glucagon  Injectable 1 milliGRAM(s) IntraMuscular once  insulin glargine Injectable (LANTUS) 30 Unit(s) SubCutaneous at bedtime  insulin lispro (ADMELOG) corrective regimen sliding scale   SubCutaneous three times a day before meals  levothyroxine 25 MICROGram(s) Oral daily    dextrose 5%. 1000 milliLiter(s) IV Continuous <Continuous>  dextrose 5%. 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    Deep Vein Thrombosis (DVT)  17 yeras ago on Coumadin    CHF (congestive heart failure)  last exacerbation in 1/2017    Enlarged prostate    GERD (gastroesophageal reflux disease)    Hernia  umblical    Calculus of bile duct without cholangitis or cholecystitis without obstruction    Atrial fibrillation    S/P Hernia Repair    S/P cataract surgery, unspecified laterality    S/P ERCP  3/2017        SOCIAL HISTORY: Substance Use (street drugs): ( x ) never used  (  ) other:    FAMILY HISTORY:      REVIEW OF SYSTEMS:  CONSTITUTIONAL: No fever, weight loss, or fatigue  EYES: No eye pain, visual disturbances, or discharge  ENMT:  No difficulty hearing, tinnitus, vertigo; No sinus or throat pain  BREASTS: No pain, masses, or nipple discharge  GASTROINTESTINAL: No abdominal or epigastric pain. No nausea, vomiting, or hematemesis; No diarrhea or constipation. No melena or hematochezia.  GENITOURINARY: No dysuria, frequency, hematuria, or incontinence  NEUROLOGICAL: No headaches, memory loss, loss of strength, numbness, or tremors  ENDOCRINE: No heat or cold intolerance; No hair loss  MUSCULOSKELETAL: No joint pain or swelling; No muscle, back, or extremity pain  PSYCHIATRIC: No depression, anxiety, mood swings, or difficulty sleeping  HEME/LYMPH: No easy bruising, or bleeding gums  All others negative    PHYSICAL EXAM:  T(C): 37.1 (04-06-22 @ 21:39), Max: 37.2 (04-06-22 @ 10:12)  HR: 87 (04-06-22 @ 21:39) (74 - 92)  BP: 111/70 (04-06-22 @ 21:39) (106/55 - 128/61)  RR: 18 (04-06-22 @ 21:39) (18 - 18)  SpO2: 93% (04-06-22 @ 21:39) (91% - 98%)  Wt(kg): --  I&O's Summary    05 Apr 2022 07:01 - 06 Apr 2022 07:00  --------------------------------------------------------  IN: 1312 mL / OUT: 450 mL / NET: 862 mL    06 Apr 2022 07:01  -  07 Apr 2022 00:31  --------------------------------------------------------  IN: 854 mL / OUT: 200 mL / NET: 654 mL    GENERAL: NAD  EYES:   PERRLA   ENMT:   Moist mucous membranes, Good dentition, No lesions  Cardiovascular: Normal S1 S2, No JVD, No murmurs, No edema  Respiratory: scattered wheezing   Gastrointestinal:  Soft, Non-tender, + BS	  Extremities: right bka                             7.4    10.93 )-----------( 228      ( 06 Apr 2022 08:51 )             26.1     04-06    144  |  107  |  49<H>  ----------------------------<  152<H>  4.4   |  29  |  1.68<H>    Ca    8.7      06 Apr 2022 08:51  Phos  3.4     04-06  Mg     2.4     04-06      proBNP:   Lipid Profile:   HgA1c:   TSH:     Consultant(s) Notes Reviewed:  [x ] YES  [ ] NO    Care Discussed with Consultants/Other Providers [ x] YES  [ ] NO    Imaging Personally Reviewed independently:  [x] YES  [ ] NO    All labs, radiologic studies, vitals, orders and medications list reviewed. Patient is seen and examined at bedside. Case discussed with medical team.

## 2022-04-06 NOTE — PROGRESS NOTE ADULT - ASSESSMENT
86M with PMH of COPD (not on home o2), prior smoking history (40 pack years), HTN, HLD, Afib, CHF, T2DM, CVA, BPH, and PAD admitted for elective RLE AKA. Renal consulted for CKD Mx.    CKD 3, stable  baseline Cr ~1.7  k stable  Hypervolemic clinically  c/w losartan 100mg daily  off lasix and on metolazone  monitor BMP daily and u/o   dose all meds for eGFR  avoid NSAIDs/Nephrotoxics.    HYpernatremia- encourage free h20 intake  Hypokalemia -magnesium  stabke  HTN, controlled. bp optimal   continue ARB BB. monitor bp    Rt LE nonhealing wound:  Vascular/Cardio f/up noted.  ECHO  awaiting AKA  optimized renal stand point for OR. keep K ~4.0    Dr Knight  [phone]

## 2022-04-06 NOTE — DIETITIAN INITIAL EVALUATION ADULT - ENERGY INTAKE
Pt reports fair PO intake and appetite in-house, requesting fruit for dinner. Discussed importance of adequate protein intake. Daughter at bedside to encourage pt to consume some chicken for lunch. Reviewed menu ordering procedures, pt encouraged to participate in meal selection. Daughter to assist with meal selections as able. Pt receptive to consuming Glucerna shakes to supplement PO intake.

## 2022-04-06 NOTE — PROGRESS NOTE ADULT - ASSESSMENT
EKG -  A sense V paced PVC's  Echo - Mild LV dysfunction mild aortic stenosis    a/p     1) Preop clearance for AKA - pt has h/o moderate LV dysfunction as per echo 2017, no chest pains 2d echo now shows mild LV dysfunction  , 12 lead EKG ok,  PPM interrogated , s/p BKA , pt wheezing b/l f/u pulm recs can hold metoprolol if needed consider steroids     2) HTN - continue losartan and metoprolol     3) Chronic systolic CHF - continue lasix, toprol and losartan     4) ?Atrial fib - coumadin on hold on IV heparin

## 2022-04-06 NOTE — DIETITIAN INITIAL EVALUATION ADULT - OTHER INFO
Weight: weight noted as 233lbs (6/11/14), current dosing weight is 203lbs (prior to BKA), weight today is 216lbs --? accuracy. Pt denies any significant weight changes.

## 2022-04-06 NOTE — DIETITIAN INITIAL EVALUATION ADULT - REASON FOR ADMISSION
This is a "86 year old man with HTN HLD DM and nonhealing wounds of RLE who is non-ambulatory at home. Now s/p R BKA on 04/05"

## 2022-04-07 NOTE — CONSULT NOTE ADULT - SUBJECTIVE AND OBJECTIVE BOX
SICU Consultation Note  =====================================================  HPI: 86M with a PMH DM, HTN, HLD who has been followed for the past 6 months for foot wounds and leg pain. He underwent and angiogram with Dr. Quinn in september of 2021 and has been followed in the office. He reports having pain in the RLE mostly in the toes and has open wounds with some purulence and gangrene. The pain is worse at night when lying in bed, and improves with Tylenol and dangling the foot off the bed. He is non-ambulatory at home.  He presents today from the office for preoperative planning and optimization for a RLE AKA on Monday 4/4 with Dr. Quinn.     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    Deep Vein Thrombosis (DVT)  17 yeras ago on Coumadin    CHF (congestive heart failure)  last exacerbation in 1/2017    Enlarged prostate    GERD (gastroesophageal reflux disease)    Hernia  umblical    Calculus of bile duct without cholangitis or cholecystitis without obstruction    Atrial fibrillation    S/P Hernia Repair    S/P cataract surgery, unspecified laterality    S/P ERCP  3/2017      Home Meds: Home Medications:  acetaminophen 325 mg oral tablet: 2 tab(s) orally every 6 hours, As needed, Mild Pain (1 - 3) (15 Sep 2021 09:47)  aspirin 81 mg oral tablet, chewable: 1 tab(s) orally once a day (03 Sep 2021 00:27)  budesonide 0.5 mg/2 mL inhalation suspension: 0.5 milligram(s) inhaled 2 times a day (03 Sep 2021 00:27)  Centrum Silver oral tablet: 1 tab(s) orally once a day (03 Sep 2021 00:27)  Coumadin 5 mg oral tablet: 1 tab(s) orally once a day (at bedtime) (15 Sep 2021 09:47)  Crestor 10 mg oral tablet: 1 tab(s) orally once a day (at bedtime) (03 Sep 2021 00:27)  finasteride 5 mg oral tablet: 1 tab(s) orally once a day (03 Sep 2021 00:27)  furosemide 20 mg oral tablet: 1 tab(s) orally 2 times a day   (03 Sep 2021 00:27)  gabapentin 100 mg oral tablet: 1 tab(s) orally 2 times a day (04 Apr 2022 13:25)  glipiZIDE 10 mg oral tablet: 1 tab(s) orally 2 times a day (03 Sep 2021 00:27)  insulin glargine: 30 unit(s) subcutaneous once a day (at bedtime) (04 Apr 2022 13:26)  metformin 500 mg oral tablet: 1 tab(s) orally 2 times a day (03 Sep 2021 00:27)  metOLazone 2.5 mg oral tablet: 1 tab(s) orally 3 times a week (03 Sep 2021 00:27)  metoprolol succinate 25 mg oral capsule, extended release: 1 cap(s) orally once a day (03 Sep 2021 00:27)  montelukast 10 mg oral tablet: 1 tab(s) orally once a day (03 Sep 2021 00:27)  omeprazole 40 mg oral delayed release capsule: 1 cap(s) orally once a day (03 Sep 2021 00:27)  Synthroid 25 mcg (0.025 mg) oral tablet: 1 tab(s) orally once a day (03 Sep 2021 00:27)  tamsulosin 0.4 mg oral capsule: 2 cap(s) orally once a day (at bedtime) (15 Sep 2021 09:47)  telmisartan 80 mg oral tablet: 1 tab(s) orally once a day (03 Sep 2021 00:27)  traMADol 50 mg oral tablet: 1 tab(s) orally every 6 hours, As Needed (04 Apr 2022 13:27)    Allergies: Allergies    No Known Allergies    Intolerances      Soc:   Advanced Directives: Presumed Full Code     ROS:    REVIEW OF SYSTEMS    [X] A ten-point review of systems was otherwise negative except as noted.  [ ] Due to altered mental status/intubation, subjective information were not able to be obtained from the patient. History was obtained, to the extent possible, from review of the chart and collateral sources of information.      CURRENT MEDICATIONS:   --------------------------------------------------------------------------------------  Neurologic Medications  acetaminophen     Tablet .. 975 milliGRAM(s) Oral every 6 hours  gabapentin 300 milliGRAM(s) Oral every 8 hours  HYDROmorphone  Injectable 0.5 milliGRAM(s) IV Push daily PRN AM dressing change  oxyCODONE    IR 5 milliGRAM(s) Oral every 4 hours PRN Moderate Pain (4 - 6)  oxyCODONE    IR 10 milliGRAM(s) Oral every 4 hours PRN Severe Pain (7 - 10)  traMADol 50 milliGRAM(s) Oral every 12 hours PRN Moderate Pain (4 - 6)    Respiratory Medications  albuterol/ipratropium for Nebulization 3 milliLiter(s) Nebulizer <User Schedule>  buDESOnide    Inhalation Suspension 0.5 milliGRAM(s) Inhalation every 12 hours  guaiFENesin ER 1200 milliGRAM(s) Oral every 12 hours  montelukast 10 milliGRAM(s) Oral daily    Cardiovascular Medications  losartan 100 milliGRAM(s) Oral daily  metolazone 2.5 milliGRAM(s) Oral <User Schedule>  metoprolol succinate ER 25 milliGRAM(s) Oral daily  tamsulosin 0.8 milliGRAM(s) Oral at bedtime    Gastrointestinal Medications  dextrose 5%. 1000 milliLiter(s) IV Continuous <Continuous>  dextrose 5%. 1000 milliLiter(s) IV Continuous <Continuous>  multivitamin/minerals 1 Tablet(s) Oral daily  pantoprazole    Tablet 40 milliGRAM(s) Oral before breakfast    Genitourinary Medications    Hematologic/Oncologic Medications  aspirin  chewable 81 milliGRAM(s) Oral daily  heparin  Infusion 1200 Unit(s)/Hr IV Continuous <Continuous>    Antimicrobial/Immunologic Medications  cefTRIAXone   IVPB        Endocrine/Metabolic Medications  atorvastatin 40 milliGRAM(s) Oral at bedtime  dextrose 50% Injectable 25 Gram(s) IV Push once  dextrose 50% Injectable 25 Gram(s) IV Push once  dextrose 50% Injectable 12.5 Gram(s) IV Push once  dextrose 50% Injectable 25 Gram(s) IV Push once  dextrose Oral Gel 15 Gram(s) Oral once PRN Blood Glucose LESS THAN 70 milliGRAM(s)/deciliter  dextrose Oral Gel 15 Gram(s) Oral once PRN Blood Glucose LESS THAN 70 milliGRAM(s)/deciliter  finasteride 5 milliGRAM(s) Oral daily  glucagon  Injectable 1 milliGRAM(s) IntraMuscular once  glucagon  Injectable 1 milliGRAM(s) IntraMuscular once  insulin glargine Injectable (LANTUS) 30 Unit(s) SubCutaneous at bedtime  insulin lispro (ADMELOG) corrective regimen sliding scale   SubCutaneous three times a day before meals  levothyroxine 25 MICROGram(s) Oral daily  methylPREDNISolone sodium succinate Injectable 20 milliGRAM(s) IV Push every 6 hours    Topical/Other Medications    --------------------------------------------------------------------------------------  VITAL SIGNS, INS/OUTS (last 24 hours):  --------------------------------------------------------------------------------------  ICU Vital Signs Last 24 Hrs  T(C): 36.3 (07 Apr 2022 14:27), Max: 37.8 (07 Apr 2022 11:11)  T(F): 97.4 (07 Apr 2022 14:27), Max: 100 (07 Apr 2022 11:11)  HR: 68 (07 Apr 2022 14:27) (67 - 98)  BP: 110/50 (07 Apr 2022 14:27) (100/44 - 123/67)  BP(mean): --  ABP: --  ABP(mean): --  RR: 18 (07 Apr 2022 14:27) (18 - 18)  SpO2: 96% (07 Apr 2022 14:27) (92% - 100%)    I&O's Summary    06 Apr 2022 07:01  -  07 Apr 2022 07:00  --------------------------------------------------------  IN: 1122 mL / OUT: 200 mL / NET: 922 mL      --------------------------------------------------------------------------------------  EXAM:  General/Neuro  Exam: Normal, NAD, alert, oriented x 3, no focal deficits. PERRLA     Respiratory  Exam: Lungs clear to auscultation, Normal expansion/effort.   HFNC 40/40    Cardiovascular  Exam: S1, S2.  Regular rate and rhythm.  Peripheral edema    Cardiac Rhythm: Normal Sinus Rhythm    GI  Exam: Abdomen soft, Non-tender, Non-distended.     Tubes/Lines/Drains  [x] Peripheral IV  [] Central Venous Line     	[] R	[] L	[] IJ	[] Fem	[] SC        Type:	    Date Placed:   [] Arterial Line		[] R	[] L	[] Fem	[] Rad	[] Ax	Date Placed:   [] PICC:         	[] Midline		[] Mediport           [] Urinary Catheter		Date Placed:     Extremities  Exam: Extremities warm, pink, well-perfused.      Derm:  Exam: Good skin turgor, no skin breakdown.      :   Exam: Adair catheter in place.     LABS  --------------------------------------------------------------------------------------                        7.3    11.16 )-----------( 242      ( 07 Apr 2022 07:16 )             25.2   04-07    141  |  102  |  60<H>  ----------------------------<  177<H>  4.2   |  25  |  1.89<H>    Ca    9.0      07 Apr 2022 07:18  Phos  3.4     04-07  Mg     2.4     04-07    PTT - ( 07 Apr 2022 16:11 )  PTT:66.3 sec  -------------------------------------------------------------------------------------- SICU Consultation Note  =====================================================  HPI: 86M with a PMH DM, HTN, HLD who has been followed for the past 6 months for foot wounds and leg pain. He underwent and angiogram with Dr. Quinn in september of 2021 and has been followed in the office. He reports having pain in the RLE mostly in the toes and has open wounds with some purulence and gangrene. 4/4/22 patient was taken to the operating room for a right guillotine BKA with plans for a stages AKA formalization. Postoperative course c/b severe COPD exacerbation requiring excalation of O2 supplementation with HFNC. SICU consulted for close respiratory monitoring in the setting of COPD exacerbation.    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  Deep Vein Thrombosis (DVT)  17 yeras ago on Coumadin  CHF (congestive heart failure)  last exacerbation in 1/2017  Enlarged prostate  GERD (gastroesophageal reflux disease)  Hernia  umblical  Calculus of bile duct without cholangitis or cholecystitis without obstruction  Atrial fibrillation  S/P Hernia Repair  S/P cataract surgery, unspecified laterality  S/P ERCP  3/2017    Home Meds: Home Medications:  acetaminophen 325 mg oral tablet: 2 tab(s) orally every 6 hours, As needed, Mild Pain (1 - 3) (15 Sep 2021 09:47)  aspirin 81 mg oral tablet, chewable: 1 tab(s) orally once a day (03 Sep 2021 00:27)  budesonide 0.5 mg/2 mL inhalation suspension: 0.5 milligram(s) inhaled 2 times a day (03 Sep 2021 00:27)  Centrum Silver oral tablet: 1 tab(s) orally once a day (03 Sep 2021 00:27)  Coumadin 5 mg oral tablet: 1 tab(s) orally once a day (at bedtime) (15 Sep 2021 09:47)  Crestor 10 mg oral tablet: 1 tab(s) orally once a day (at bedtime) (03 Sep 2021 00:27)  finasteride 5 mg oral tablet: 1 tab(s) orally once a day (03 Sep 2021 00:27)  furosemide 20 mg oral tablet: 1 tab(s) orally 2 times a day   (03 Sep 2021 00:27)  gabapentin 100 mg oral tablet: 1 tab(s) orally 2 times a day (04 Apr 2022 13:25)  glipiZIDE 10 mg oral tablet: 1 tab(s) orally 2 times a day (03 Sep 2021 00:27)  insulin glargine: 30 unit(s) subcutaneous once a day (at bedtime) (04 Apr 2022 13:26)  metformin 500 mg oral tablet: 1 tab(s) orally 2 times a day (03 Sep 2021 00:27)  metOLazone 2.5 mg oral tablet: 1 tab(s) orally 3 times a week (03 Sep 2021 00:27)  metoprolol succinate 25 mg oral capsule, extended release: 1 cap(s) orally once a day (03 Sep 2021 00:27)  montelukast 10 mg oral tablet: 1 tab(s) orally once a day (03 Sep 2021 00:27)  omeprazole 40 mg oral delayed release capsule: 1 cap(s) orally once a day (03 Sep 2021 00:27)  Synthroid 25 mcg (0.025 mg) oral tablet: 1 tab(s) orally once a day (03 Sep 2021 00:27)  tamsulosin 0.4 mg oral capsule: 2 cap(s) orally once a day (at bedtime) (15 Sep 2021 09:47)  telmisartan 80 mg oral tablet: 1 tab(s) orally once a day (03 Sep 2021 00:27)  traMADol 50 mg oral tablet: 1 tab(s) orally every 6 hours, As Needed (04 Apr 2022 13:27)    Allergies: Allergies    No Known Allergies    Intolerances    Soc:   Advanced Directives: Presumed Full Code     ROS:    REVIEW OF SYSTEMS  [X] A ten-point review of systems was otherwise negative except as noted.  [ ] Due to altered mental status/intubation, subjective information were not able to be obtained from the patient. History was obtained, to the extent possible, from review of the chart and collateral sources of information.      CURRENT MEDICATIONS:   --------------------------------------------------------------------------------------  Neurologic Medications  acetaminophen     Tablet .. 975 milliGRAM(s) Oral every 6 hours  gabapentin 300 milliGRAM(s) Oral every 8 hours  HYDROmorphone  Injectable 0.5 milliGRAM(s) IV Push daily PRN AM dressing change  oxyCODONE    IR 5 milliGRAM(s) Oral every 4 hours PRN Moderate Pain (4 - 6)  oxyCODONE    IR 10 milliGRAM(s) Oral every 4 hours PRN Severe Pain (7 - 10)  traMADol 50 milliGRAM(s) Oral every 12 hours PRN Moderate Pain (4 - 6)    Respiratory Medications  albuterol/ipratropium for Nebulization 3 milliLiter(s) Nebulizer <User Schedule>  buDESOnide    Inhalation Suspension 0.5 milliGRAM(s) Inhalation every 12 hours  guaiFENesin ER 1200 milliGRAM(s) Oral every 12 hours  montelukast 10 milliGRAM(s) Oral daily    Cardiovascular Medications  losartan 100 milliGRAM(s) Oral daily  metolazone 2.5 milliGRAM(s) Oral <User Schedule>  metoprolol succinate ER 25 milliGRAM(s) Oral daily  tamsulosin 0.8 milliGRAM(s) Oral at bedtime    Gastrointestinal Medications  dextrose 5%. 1000 milliLiter(s) IV Continuous <Continuous>  dextrose 5%. 1000 milliLiter(s) IV Continuous <Continuous>  multivitamin/minerals 1 Tablet(s) Oral daily  pantoprazole    Tablet 40 milliGRAM(s) Oral before breakfast    Genitourinary Medications    Hematologic/Oncologic Medications  aspirin  chewable 81 milliGRAM(s) Oral daily  heparin  Infusion 1200 Unit(s)/Hr IV Continuous <Continuous>    Antimicrobial/Immunologic Medications  cefTRIAXone   IVPB        Endocrine/Metabolic Medications  atorvastatin 40 milliGRAM(s) Oral at bedtime  dextrose 50% Injectable 25 Gram(s) IV Push once  dextrose 50% Injectable 25 Gram(s) IV Push once  dextrose 50% Injectable 12.5 Gram(s) IV Push once  dextrose 50% Injectable 25 Gram(s) IV Push once  dextrose Oral Gel 15 Gram(s) Oral once PRN Blood Glucose LESS THAN 70 milliGRAM(s)/deciliter  dextrose Oral Gel 15 Gram(s) Oral once PRN Blood Glucose LESS THAN 70 milliGRAM(s)/deciliter  finasteride 5 milliGRAM(s) Oral daily  glucagon  Injectable 1 milliGRAM(s) IntraMuscular once  glucagon  Injectable 1 milliGRAM(s) IntraMuscular once  insulin glargine Injectable (LANTUS) 30 Unit(s) SubCutaneous at bedtime  insulin lispro (ADMELOG) corrective regimen sliding scale   SubCutaneous three times a day before meals  levothyroxine 25 MICROGram(s) Oral daily  methylPREDNISolone sodium succinate Injectable 20 milliGRAM(s) IV Push every 6 hours    Topical/Other Medications    --------------------------------------------------------------------------------------  VITAL SIGNS, INS/OUTS (last 24 hours):  --------------------------------------------------------------------------------------  ICU Vital Signs Last 24 Hrs  T(C): 36.3 (07 Apr 2022 14:27), Max: 37.8 (07 Apr 2022 11:11)  T(F): 97.4 (07 Apr 2022 14:27), Max: 100 (07 Apr 2022 11:11)  HR: 68 (07 Apr 2022 14:27) (67 - 98)  BP: 110/50 (07 Apr 2022 14:27) (100/44 - 123/67)  BP(mean): --  ABP: --  ABP(mean): --  RR: 18 (07 Apr 2022 14:27) (18 - 18)  SpO2: 96% (07 Apr 2022 14:27) (92% - 100%)    I&O's Summary    06 Apr 2022 07:01  -  07 Apr 2022 07:00  --------------------------------------------------------  IN: 1122 mL / OUT: 200 mL / NET: 922 mL    --------------------------------------------------------------------------------------  EXAM:  General/Neuro  Exam: Normal, NAD, alert, oriented x 3, no focal deficits. PERRLA     Respiratory  Exam: Lungs clear to auscultation, Normal expansion/effort.   HFNC 40/40    Cardiovascular  Exam: S1, S2.  Regular rate and rhythm.  Peripheral edema    Cardiac Rhythm: Normal Sinus Rhythm    GI  Exam: Abdomen soft, Non-tender, Non-distended.     Tubes/Lines/Drains  [x] Peripheral IV  [] Central Venous Line     	[] R	[] L	[] IJ	[] Fem	[] SC        Type:	    Date Placed:   [] Arterial Line		[] R	[] L	[] Fem	[] Rad	[] Ax	Date Placed:   [] PICC:         	[] Midline		[] Mediport           [] Urinary Catheter		Date Placed:     Extremities  Exam: Extremities warm, pink, well-perfused.  s/p R laura JAMES    Derm:  Exam: Good skin turgor, no skin breakdown.      :   Exam: Adair catheter in place.     LABS  --------------------------------------------------------------------------------------                        7.3    11.16 )-----------( 242      ( 07 Apr 2022 07:16 )             25.2   04-07    141  |  102  |  60<H>  ----------------------------<  177<H>  4.2   |  25  |  1.89<H>    Ca    9.0      07 Apr 2022 07:18  Phos  3.4     04-07  Mg     2.4     04-07    PTT - ( 07 Apr 2022 16:11 )  PTT:66.3 sec  --------------------------------------------------------------------------------------

## 2022-04-07 NOTE — PROGRESS NOTE ADULT - SUBJECTIVE AND OBJECTIVE BOX
Patient is a 86y old  Male who presents with a chief complaint of Preoperative Planning for Right above knee amputation (07 Apr 2022 17:06)      SUBJECTIVE / OVERNIGHT EVENTS:    Events noted.  CONSTITUTIONAL: No fever,  or fatigue  RESPIRATORY: Cough/On supp O2  CARDIOVASCULAR: No chest pain, palpitations, dizziness, or leg swelling  GASTROINTESTINAL: No abdominal or epigastric pain. No nausea, vomiting.      MEDICATIONS  (STANDING):  acetaminophen     Tablet .. 975 milliGRAM(s) Oral every 6 hours  albuterol/ipratropium for Nebulization 3 milliLiter(s) Nebulizer <User Schedule>  aspirin  chewable 81 milliGRAM(s) Oral daily  atorvastatin 40 milliGRAM(s) Oral at bedtime  buDESOnide    Inhalation Suspension 0.5 milliGRAM(s) Inhalation every 12 hours  cefTRIAXone   IVPB      dextrose 5%. 1000 milliLiter(s) (50 mL/Hr) IV Continuous <Continuous>  dextrose 5%. 1000 milliLiter(s) (100 mL/Hr) IV Continuous <Continuous>  dextrose 50% Injectable 25 Gram(s) IV Push once  dextrose 50% Injectable 25 Gram(s) IV Push once  dextrose 50% Injectable 12.5 Gram(s) IV Push once  dextrose 50% Injectable 25 Gram(s) IV Push once  finasteride 5 milliGRAM(s) Oral daily  gabapentin 300 milliGRAM(s) Oral every 8 hours  glucagon  Injectable 1 milliGRAM(s) IntraMuscular once  glucagon  Injectable 1 milliGRAM(s) IntraMuscular once  guaiFENesin ER 1200 milliGRAM(s) Oral every 12 hours  heparin  Infusion 1200 Unit(s)/Hr (16 mL/Hr) IV Continuous <Continuous>  insulin glargine Injectable (LANTUS) 30 Unit(s) SubCutaneous at bedtime  insulin lispro (ADMELOG) corrective regimen sliding scale   SubCutaneous three times a day before meals  insulin lispro (ADMELOG) corrective regimen sliding scale   SubCutaneous at bedtime  levothyroxine 25 MICROGram(s) Oral daily  losartan 100 milliGRAM(s) Oral daily  methylPREDNISolone sodium succinate Injectable 20 milliGRAM(s) IV Push every 6 hours  metolazone 2.5 milliGRAM(s) Oral <User Schedule>  metoprolol succinate ER 25 milliGRAM(s) Oral daily  montelukast 10 milliGRAM(s) Oral daily  multivitamin/minerals 1 Tablet(s) Oral daily  pantoprazole    Tablet 40 milliGRAM(s) Oral before breakfast  tamsulosin 0.8 milliGRAM(s) Oral at bedtime    MEDICATIONS  (PRN):  dextrose Oral Gel 15 Gram(s) Oral once PRN Blood Glucose LESS THAN 70 milliGRAM(s)/deciliter  dextrose Oral Gel 15 Gram(s) Oral once PRN Blood Glucose LESS THAN 70 milliGRAM(s)/deciliter  HYDROmorphone  Injectable 0.5 milliGRAM(s) IV Push daily PRN AM dressing change  oxyCODONE    IR 10 milliGRAM(s) Oral every 4 hours PRN Severe Pain (7 - 10)  oxyCODONE    IR 5 milliGRAM(s) Oral every 4 hours PRN Moderate Pain (4 - 6)  traMADol 50 milliGRAM(s) Oral every 12 hours PRN Moderate Pain (4 - 6)        CAPILLARY BLOOD GLUCOSE      POCT Blood Glucose.: 366 mg/dL (07 Apr 2022 21:54)  POCT Blood Glucose.: 364 mg/dL (07 Apr 2022 21:53)  POCT Blood Glucose.: 247 mg/dL (07 Apr 2022 18:07)  POCT Blood Glucose.: 199 mg/dL (07 Apr 2022 13:25)  POCT Blood Glucose.: 187 mg/dL (07 Apr 2022 09:56)    I&O's Summary    06 Apr 2022 07:01  -  07 Apr 2022 07:00  --------------------------------------------------------  IN: 1122 mL / OUT: 200 mL / NET: 922 mL    07 Apr 2022 07:01  -  07 Apr 2022 22:18  --------------------------------------------------------  IN: 630 mL / OUT: 300 mL / NET: 330 mL        T(C): 36.9 (04-07-22 @ 21:56), Max: 37.8 (04-07-22 @ 11:11)  HR: 78 (04-07-22 @ 21:56) (67 - 98)  BP: 133/68 (04-07-22 @ 21:56) (100/44 - 133/68)  RR: 19 (04-07-22 @ 21:56) (18 - 19)  SpO2: 93% (04-07-22 @ 21:56) (91% - 100%)    PHYSICAL EXAM:  GENERAL: NAD  NECK: Supple, No JVD  CHEST/LUNG: Clear to auscultation bilaterally; No wheezing.  HEART: Regular rate and rhythm; No murmurs, rubs, or gallops  ABDOMEN: Soft, Nontender, Nondistended; Bowel sounds present  EXTREMITIES:  Rt AKA  NEUROLOGY: AAO       LABS:                        7.3    11.16 )-----------( 242      ( 07 Apr 2022 07:16 )             25.2     04-07    141  |  102  |  60<H>  ----------------------------<  177<H>  4.2   |  25  |  1.89<H>    Ca    9.0      07 Apr 2022 07:18  Phos  3.4     04-07  Mg     2.4     04-07      PTT - ( 07 Apr 2022 16:11 )  PTT:66.3 sec        CAPILLARY BLOOD GLUCOSE      POCT Blood Glucose.: 366 mg/dL (07 Apr 2022 21:54)  POCT Blood Glucose.: 364 mg/dL (07 Apr 2022 21:53)  POCT Blood Glucose.: 247 mg/dL (07 Apr 2022 18:07)  POCT Blood Glucose.: 199 mg/dL (07 Apr 2022 13:25)  POCT Blood Glucose.: 187 mg/dL (07 Apr 2022 09:56)        RADIOLOGY & ADDITIONAL TESTS:    Imaging Personally Reviewed:    Consultant(s) Notes Reviewed:      Care Discussed with Consultants/Other Providers:    Graham Pulliam MD, CMD, FACP    [address] [place] [address]  Office Tel: [phone]  Cell: [phone]

## 2022-04-07 NOTE — PROGRESS NOTE ADULT - ASSESSMENT
EKG -  A sense V paced PVC's  Echo - Mild LV dysfunction mild aortic stenosis    a/p     1) Preop clearance for AKA - pt has h/o moderate LV dysfunction as per echo 2017, no chest pains 2d echo now shows mild LV dysfunction  , 12 lead EKG ok,  PPM interrogated , s/p BKA , pt wheezing b/l f/u pulm recs now on steroids, needs ABG to assess PCO2     2) HTN - continue losartan and metoprolol     3) Chronic systolic CHF - euvolemic on exam hold metolazone     4) ?Atrial fib - coumadin on hold on IV heparin     5) KARLOS : would hold losartan and metolazone

## 2022-04-07 NOTE — PROGRESS NOTE ADULT - ASSESSMENT
Patient is a 86y old  Male who presents with a chief complaint of Preoperative Planning for Right above knee amputation.    Rt LE nonhealing wound:    Vascular/Cardio f/up noted.  S/p Rt AKA    A Fib:    IV Heparin  Cw Metoprolol    COPD Exa:    Cw Duoneb  Pulmicort  On High flow NC  Pul eval appreciated

## 2022-04-07 NOTE — PROGRESS NOTE ADULT - ASSESSMENT
86M with PMH of COPD (not on home o2), prior smoking history (40 pack years), HTN, HLD, Afib, CHF, T2DM, CVA, BPH, and PAD admitted for elective RLE AKA. Renal consulted for CKD Mx.    CKD 3, stable  baseline Cr ~1.7  k stable  Hypervolemic clinically  c/w losartan 100mg daily  off lasix and  will also d/c metolazone  has been tolerating po well--> watch off IVF  Fluctuations in cr expected  monitor BMP daily and u/o   dose all meds for eGFR  avoid NSAIDs/Nephrotoxics.    HYpernatremia- encourage free h20 intake  Hypokalemia -magnesium  stable; k stable  HTN, controlled. bp optimal   continue ARB BB. monitor bp    Rt LE nonhealing wound:  Vascular/Cardio f/up noted.  ECHO  awaiting AKA  optimized renal stand point for OR. keep K ~4.0    Dr Knight  [phone]

## 2022-04-07 NOTE — PROGRESS NOTE ADULT - SUBJECTIVE AND OBJECTIVE BOX
Vascular  Surgery Progress Note    INTERVAL EVENTS:   No acute events overnight.    SUBJECTIVE: Patient seen and examined at bedside with surgical team    OBJECTIVE:    Vital Signs Last 24 Hrs  T(C): 37.2 (07 Apr 2022 01:09), Max: 37.2 (06 Apr 2022 10:12)  T(F): 99 (07 Apr 2022 01:09), Max: 99 (06 Apr 2022 10:12)  HR: 88 (07 Apr 2022 01:09) (74 - 92)  BP: 122/59 (07 Apr 2022 01:09) (106/55 - 128/61)  BP(mean): --  RR: 18 (07 Apr 2022 01:09) (18 - 18)  SpO2: 93% (07 Apr 2022 01:09) (91% - 98%)I&O's Detail    05 Apr 2022 07:01  -  06 Apr 2022 07:00  --------------------------------------------------------  IN:    Heparin: 252 mL    Oral Fluid: 1060 mL  Total IN: 1312 mL    OUT:    Voided (mL): 450 mL  Total OUT: 450 mL    Total NET: 862 mL      06 Apr 2022 07:01  -  07 Apr 2022 01:33  --------------------------------------------------------  IN:    Heparin: 154 mL    Oral Fluid: 700 mL  Total IN: 854 mL    OUT:    Voided (mL): 200 mL  Total OUT: 200 mL    Total NET: 654 mL      MEDICATIONS  (STANDING):  acetaminophen     Tablet .. 975 milliGRAM(s) Oral every 6 hours  albuterol/ipratropium for Nebulization 3 milliLiter(s) Nebulizer every 6 hours  aspirin  chewable 81 milliGRAM(s) Oral daily  atorvastatin 40 milliGRAM(s) Oral at bedtime  buDESOnide    Inhalation Suspension 0.5 milliGRAM(s) Inhalation every 12 hours  dextrose 5%. 1000 milliLiter(s) (100 mL/Hr) IV Continuous <Continuous>  dextrose 5%. 1000 milliLiter(s) (50 mL/Hr) IV Continuous <Continuous>  dextrose 50% Injectable 25 Gram(s) IV Push once  dextrose 50% Injectable 12.5 Gram(s) IV Push once  dextrose 50% Injectable 25 Gram(s) IV Push once  dextrose 50% Injectable 25 Gram(s) IV Push once  finasteride 5 milliGRAM(s) Oral daily  gabapentin 300 milliGRAM(s) Oral every 8 hours  glucagon  Injectable 1 milliGRAM(s) IntraMuscular once  glucagon  Injectable 1 milliGRAM(s) IntraMuscular once  heparin  Infusion 1200 Unit(s)/Hr (14 mL/Hr) IV Continuous <Continuous>  insulin glargine Injectable (LANTUS) 30 Unit(s) SubCutaneous at bedtime  insulin lispro (ADMELOG) corrective regimen sliding scale   SubCutaneous three times a day before meals  levothyroxine 25 MICROGram(s) Oral daily  losartan 100 milliGRAM(s) Oral daily  metolazone 2.5 milliGRAM(s) Oral <User Schedule>  metoprolol succinate ER 25 milliGRAM(s) Oral daily  montelukast 10 milliGRAM(s) Oral daily  multivitamin/minerals 1 Tablet(s) Oral daily  pantoprazole    Tablet 40 milliGRAM(s) Oral before breakfast  tamsulosin 0.8 milliGRAM(s) Oral at bedtime    MEDICATIONS  (PRN):  dextrose Oral Gel 15 Gram(s) Oral once PRN Blood Glucose LESS THAN 70 milliGRAM(s)/deciliter  dextrose Oral Gel 15 Gram(s) Oral once PRN Blood Glucose LESS THAN 70 milliGRAM(s)/deciliter  HYDROmorphone  Injectable 0.5 milliGRAM(s) IV Push daily PRN AM dressing change  oxyCODONE    IR 5 milliGRAM(s) Oral every 4 hours PRN Severe Pain (7 - 10)  traMADol 50 milliGRAM(s) Oral every 12 hours PRN Moderate Pain (4 - 6)      PHYSICAL EXAM:  Constitutional:   Respiratory:   Abdomen:  Extremities:     LABS:                        7.4    10.93 )-----------( 228      ( 06 Apr 2022 08:51 )             26.1     04-06    144  |  107  |  49<H>  ----------------------------<  152<H>  4.4   |  29  |  1.68<H>    Ca    8.7      06 Apr 2022 08:51  Phos  3.4     04-06  Mg     2.4     04-06      PT/INR - ( 05 Apr 2022 10:30 )   PT: 14.4 sec;   INR: 1.24 ratio         PTT - ( 06 Apr 2022 08:52 )  PTT:70.7 sec          IMAGING:     Vascular  Surgery Progress Note    INTERVAL EVENTS:   No acute events overnight.    SUBJECTIVE: Patient seen and examined at bedside with surgical team. Denies any new complaints.     OBJECTIVE:    Vital Signs Last 24 Hrs  T(C): 37.2 (07 Apr 2022 01:09), Max: 37.2 (06 Apr 2022 10:12)  T(F): 99 (07 Apr 2022 01:09), Max: 99 (06 Apr 2022 10:12)  HR: 88 (07 Apr 2022 01:09) (74 - 92)  BP: 122/59 (07 Apr 2022 01:09) (106/55 - 128/61)  BP(mean): --  RR: 18 (07 Apr 2022 01:09) (18 - 18)  SpO2: 93% (07 Apr 2022 01:09) (91% - 98%)I&O's Detail    05 Apr 2022 07:01  -  06 Apr 2022 07:00  --------------------------------------------------------  IN:    Heparin: 252 mL    Oral Fluid: 1060 mL  Total IN: 1312 mL    OUT:    Voided (mL): 450 mL  Total OUT: 450 mL    Total NET: 862 mL      06 Apr 2022 07:01  -  07 Apr 2022 01:33  --------------------------------------------------------  IN:    Heparin: 154 mL    Oral Fluid: 700 mL  Total IN: 854 mL    OUT:    Voided (mL): 200 mL  Total OUT: 200 mL    Total NET: 654 mL      MEDICATIONS  (STANDING):  acetaminophen     Tablet .. 975 milliGRAM(s) Oral every 6 hours  albuterol/ipratropium for Nebulization 3 milliLiter(s) Nebulizer every 6 hours  aspirin  chewable 81 milliGRAM(s) Oral daily  atorvastatin 40 milliGRAM(s) Oral at bedtime  buDESOnide    Inhalation Suspension 0.5 milliGRAM(s) Inhalation every 12 hours  dextrose 5%. 1000 milliLiter(s) (100 mL/Hr) IV Continuous <Continuous>  dextrose 5%. 1000 milliLiter(s) (50 mL/Hr) IV Continuous <Continuous>  dextrose 50% Injectable 25 Gram(s) IV Push once  dextrose 50% Injectable 12.5 Gram(s) IV Push once  dextrose 50% Injectable 25 Gram(s) IV Push once  dextrose 50% Injectable 25 Gram(s) IV Push once  finasteride 5 milliGRAM(s) Oral daily  gabapentin 300 milliGRAM(s) Oral every 8 hours  glucagon  Injectable 1 milliGRAM(s) IntraMuscular once  glucagon  Injectable 1 milliGRAM(s) IntraMuscular once  heparin  Infusion 1200 Unit(s)/Hr (14 mL/Hr) IV Continuous <Continuous>  insulin glargine Injectable (LANTUS) 30 Unit(s) SubCutaneous at bedtime  insulin lispro (ADMELOG) corrective regimen sliding scale   SubCutaneous three times a day before meals  levothyroxine 25 MICROGram(s) Oral daily  losartan 100 milliGRAM(s) Oral daily  metolazone 2.5 milliGRAM(s) Oral <User Schedule>  metoprolol succinate ER 25 milliGRAM(s) Oral daily  montelukast 10 milliGRAM(s) Oral daily  multivitamin/minerals 1 Tablet(s) Oral daily  pantoprazole    Tablet 40 milliGRAM(s) Oral before breakfast  tamsulosin 0.8 milliGRAM(s) Oral at bedtime    MEDICATIONS  (PRN):  dextrose Oral Gel 15 Gram(s) Oral once PRN Blood Glucose LESS THAN 70 milliGRAM(s)/deciliter  dextrose Oral Gel 15 Gram(s) Oral once PRN Blood Glucose LESS THAN 70 milliGRAM(s)/deciliter  HYDROmorphone  Injectable 0.5 milliGRAM(s) IV Push daily PRN AM dressing change  oxyCODONE    IR 5 milliGRAM(s) Oral every 4 hours PRN Severe Pain (7 - 10)  traMADol 50 milliGRAM(s) Oral every 12 hours PRN Moderate Pain (4 - 6)      PHYSICAL EXAM:  Constitutional: NAD  Respiratory: Using nebulizer during exam, saturating well w/ 4L NC, no accessory muscle use. Less wheezing heard than yesterday.   Abdomen: soft, nd, nttp, no g/r  Extremities: RLE dressing undone w/premedication, amputation site clean, no purulence or bleeding. Dressing changed.     LABS:                        7.4    10.93 )-----------( 228      ( 06 Apr 2022 08:51 )             26.1     04-06    144  |  107  |  49<H>  ----------------------------<  152<H>  4.4   |  29  |  1.68<H>    Ca    8.7      06 Apr 2022 08:51  Phos  3.4     04-06  Mg     2.4     04-06      PT/INR - ( 05 Apr 2022 10:30 )   PT: 14.4 sec;   INR: 1.24 ratio         PTT - ( 06 Apr 2022 08:52 )  PTT:70.7 sec          IMAGING:

## 2022-04-07 NOTE — PROGRESS NOTE ADULT - SUBJECTIVE AND OBJECTIVE BOX
Patient seen and examined  feels well  denies any sob or cp    No Known Allergies    Hospital Medications:   MEDICATIONS  (STANDING):  acetaminophen     Tablet .. 975 milliGRAM(s) Oral every 6 hours  albuterol/ipratropium for Nebulization 3 milliLiter(s) Nebulizer <User Schedule>  aspirin  chewable 81 milliGRAM(s) Oral daily  atorvastatin 40 milliGRAM(s) Oral at bedtime  buDESOnide    Inhalation Suspension 0.5 milliGRAM(s) Inhalation every 12 hours  cefTRIAXone   IVPB 1000 milliGRAM(s) IV Intermittent once  cefTRIAXone   IVPB      dextrose 5%. 1000 milliLiter(s) (100 mL/Hr) IV Continuous <Continuous>  dextrose 5%. 1000 milliLiter(s) (50 mL/Hr) IV Continuous <Continuous>  dextrose 50% Injectable 25 Gram(s) IV Push once  dextrose 50% Injectable 12.5 Gram(s) IV Push once  dextrose 50% Injectable 25 Gram(s) IV Push once  dextrose 50% Injectable 25 Gram(s) IV Push once  finasteride 5 milliGRAM(s) Oral daily  gabapentin 300 milliGRAM(s) Oral every 8 hours  glucagon  Injectable 1 milliGRAM(s) IntraMuscular once  glucagon  Injectable 1 milliGRAM(s) IntraMuscular once  guaiFENesin ER 1200 milliGRAM(s) Oral every 12 hours  heparin  Infusion 1200 Unit(s)/Hr (16 mL/Hr) IV Continuous <Continuous>  insulin glargine Injectable (LANTUS) 30 Unit(s) SubCutaneous at bedtime  insulin lispro (ADMELOG) corrective regimen sliding scale   SubCutaneous three times a day before meals  levothyroxine 25 MICROGram(s) Oral daily  losartan 100 milliGRAM(s) Oral daily  methylPREDNISolone sodium succinate Injectable 20 milliGRAM(s) IV Push every 6 hours  metolazone 2.5 milliGRAM(s) Oral <User Schedule>  metoprolol succinate ER 25 milliGRAM(s) Oral daily  montelukast 10 milliGRAM(s) Oral daily  multivitamin/minerals 1 Tablet(s) Oral daily  pantoprazole    Tablet 40 milliGRAM(s) Oral before breakfast  tamsulosin 0.8 milliGRAM(s) Oral at bedtime      VITALS:  T(F): 98.1 (04-07-22 @ 05:52), Max: 99 (04-06-22 @ 10:12)  HR: 98 (04-07-22 @ 05:52)  BP: 123/67 (04-07-22 @ 05:52)  RR: 18 (04-07-22 @ 05:52)  SpO2: 92% (04-07-22 @ 05:52)  Wt(kg): --    04-06 @ 07:01  -  04-07 @ 07:00  --------------------------------------------------------  IN: 1122 mL / OUT: 200 mL / NET: 922 mL    PHYSICAL EXAM:  Constitutional: NAD  HEENT: anicteric sclera  Neck: No JVD  Respiratory: CTAB, no wheezes, rales or rhonchi  Cardiovascular: S1, S2, RRR  Gastrointestinal: BS+, soft, NT/ND  Extremities: 3+ peripheral edema b/l; Rt foot dressing+   Neurological: A/O x 3, no focal deficits  Psychiatric: Normal mood, normal affect  : No hudson    LABS:  04-07    141  |  102  |  60<H>  ----------------------------<  177<H>  4.2   |  25  |  1.89<H>    Ca    9.0      07 Apr 2022 07:18  Phos  3.4     04-07  Mg     2.4     04-07      Creatinine Trend: 1.89 <--, 1.68 <--, 1.56 <--, 1.52 <--, 1.48 <--, 1.33 <--                        7.3    11.16 )-----------( 242      ( 07 Apr 2022 07:16 )             25.2     Urine Studies:      RADIOLOGY & ADDITIONAL STUDIES:

## 2022-04-07 NOTE — PROGRESS NOTE ADULT - SUBJECTIVE AND OBJECTIVE BOX
Ritesh Bell MD  Interventional Cardiology / Endovascular Specialist  [place] Office : [phone] [place] [address] [place] [address]  Tel:   [place] Office : [address] [facility] [place] [address]  Tel: [phone]    Pt is lying in bed lethargic on HFNC   	  MEDICATIONS:  aspirin  chewable 81 milliGRAM(s) Oral daily  heparin  Infusion 1200 Unit(s)/Hr IV Continuous <Continuous>  losartan 100 milliGRAM(s) Oral daily  metolazone 2.5 milliGRAM(s) Oral <User Schedule>  metoprolol succinate ER 25 milliGRAM(s) Oral daily  tamsulosin 0.8 milliGRAM(s) Oral at bedtime    cefTRIAXone   IVPB        albuterol/ipratropium for Nebulization 3 milliLiter(s) Nebulizer <User Schedule>  buDESOnide    Inhalation Suspension 0.5 milliGRAM(s) Inhalation every 12 hours  guaiFENesin ER 1200 milliGRAM(s) Oral every 12 hours  montelukast 10 milliGRAM(s) Oral daily    acetaminophen     Tablet .. 975 milliGRAM(s) Oral every 6 hours  gabapentin 300 milliGRAM(s) Oral every 8 hours  HYDROmorphone  Injectable 0.5 milliGRAM(s) IV Push daily PRN  oxyCODONE    IR 5 milliGRAM(s) Oral every 4 hours PRN  oxyCODONE    IR 10 milliGRAM(s) Oral every 4 hours PRN  traMADol 50 milliGRAM(s) Oral every 12 hours PRN    pantoprazole    Tablet 40 milliGRAM(s) Oral before breakfast    atorvastatin 40 milliGRAM(s) Oral at bedtime  dextrose 50% Injectable 25 Gram(s) IV Push once  dextrose 50% Injectable 25 Gram(s) IV Push once  dextrose 50% Injectable 12.5 Gram(s) IV Push once  dextrose 50% Injectable 25 Gram(s) IV Push once  dextrose Oral Gel 15 Gram(s) Oral once PRN  dextrose Oral Gel 15 Gram(s) Oral once PRN  finasteride 5 milliGRAM(s) Oral daily  glucagon  Injectable 1 milliGRAM(s) IntraMuscular once  glucagon  Injectable 1 milliGRAM(s) IntraMuscular once  insulin glargine Injectable (LANTUS) 30 Unit(s) SubCutaneous at bedtime  insulin lispro (ADMELOG) corrective regimen sliding scale   SubCutaneous three times a day before meals  levothyroxine 25 MICROGram(s) Oral daily  methylPREDNISolone sodium succinate Injectable 20 milliGRAM(s) IV Push every 6 hours    dextrose 5%. 1000 milliLiter(s) IV Continuous <Continuous>  dextrose 5%. 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    Deep Vein Thrombosis (DVT)  17 yeras ago on Coumadin    CHF (congestive heart failure)  last exacerbation in 1/2017    Enlarged prostate    GERD (gastroesophageal reflux disease)    Hernia  umblical    Calculus of bile duct without cholangitis or cholecystitis without obstruction    Atrial fibrillation    S/P Hernia Repair    S/P cataract surgery, unspecified laterality    S/P ERCP  3/2017        SOCIAL HISTORY: Substance Use (street drugs): ( x ) never used  (  ) other:    FAMILY HISTORY:      REVIEW OF SYSTEMS:  unable to accurately obtain as pt lethargic     PHYSICAL EXAM:  T(C): 36.3 (04-07-22 @ 14:27), Max: 37.8 (04-07-22 @ 11:11)  HR: 68 (04-07-22 @ 14:27) (67 - 98)  BP: 110/50 (04-07-22 @ 14:27) (100/44 - 128/61)  RR: 18 (04-07-22 @ 14:27) (18 - 18)  SpO2: 96% (04-07-22 @ 14:27) (92% - 100%)  Wt(kg): --  I&O's Summary    06 Apr 2022 07:01  -  07 Apr 2022 07:00  --------------------------------------------------------  IN: 1122 mL / OUT: 200 mL / NET: 922 mL      GENERAL: NAD  EYES:   PERRLA   ENMT:   Moist mucous membranes, Good dentition, No lesions  Cardiovascular: Normal S1 S2, No JVD, No murmurs, No edema  Respiratory: scattered wheezing   Gastrointestinal:  Soft, Non-tender, + BS	  Extremities: right bka                               7.3    11.16 )-----------( 242      ( 07 Apr 2022 07:16 )             25.2     04-07    141  |  102  |  60<H>  ----------------------------<  177<H>  4.2   |  25  |  1.89<H>    Ca    9.0      07 Apr 2022 07:18  Phos  3.4     04-07  Mg     2.4     04-07      proBNP:   Lipid Profile:   HgA1c:   TSH:     Consultant(s) Notes Reviewed:  [x ] YES  [ ] NO    Care Discussed with Consultants/Other Providers [ x] YES  [ ] NO    Imaging Personally Reviewed independently:  [x] YES  [ ] NO    All labs, radiologic studies, vitals, orders and medications list reviewed. Patient is seen and examined at bedside. Case discussed with medical team.

## 2022-04-07 NOTE — PROGRESS NOTE ADULT - ASSESSMENT
Assessment: 86 year old man with HTN HLD DM and nonhealing wounds of RLE who is non-ambulatory at home. 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     PLAN:  - SQH, baby ASA  - Continue hep gtt, PTT therapeutic x2  - AKA planning next week  - pain control  - Reg diet  - PT eval post AKA    Vascular Surgery   [id number] 86M with HTN HLD DM and nonhealing wounds of RLE who is non-ambulatory at home. 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 Course complicated by wheezing, using 4L NC and nebulizer.     - SQH, baby ASA  - Continue hep gtt, PTT therapeutic   - AKA planning next week  - pain control  - Reg diet  - PT eval post AKA  -Tylenol, Oxycodone, dilaudid for dressing changes  -F/U Pulm recs    Vascular Surgery   [id number]

## 2022-04-07 NOTE — CONSULT NOTE ADULT - ATTENDING COMMENTS
86yr M hx of DM and COPD s/o Rt BKA SICU consulted for hypoxia and AMS. per wife at bedside, pt was sleepy earlier the day and now back to his baseline. confusion resolved. denies cp, sob, dizziness, abd pain.    on exam, HFNC 40/40, mildly increased work of breathing, diffuse crackles. no peripheral edema. aaox4, and non focal neuro exam.  per family pt is on lasix at home.  treated with bronchodilators.   CXR today non focal however indicates pulm edema.  recommend IV lasix, monitor for urine output, re eval in 3 hours, and attempt to wean off HFNC. likely pulm edema. low concern for intra cranial etiology (?poly pharmacy, post op, vs hypoxia) that would warrant CT imaging. repeat ABG is recommended.  Patient may remain on the floor as there is no ICU needs at this time. however if the patient's status changes or you do have any questions please don't hesitate to contact us. 86yr M hx of DM and COPD s/o Rt BKA SICU consulted for hypoxia and AMS. per wife at bedside, pt was sleepy earlier the day and now back to his baseline. confusion resolved. denies cp, sob, dizziness, abd pain.    on exam, HFNC 40/40, mildly increased work of breathing, diffuse crackles. no peripheral edema. aaox4, and non focal neuro exam.  per family pt is on lasix at home.  treated with bronchodilators.   CXR today non focal however indicates pulm edema.  recommend IV lasix, monitor for urine output, re eval in 3 hours, and attempt to wean off HFNC. likely pulm edema. low concern for intra cranial etiology (?poly pharmacy, post op, vs hypoxia) that would warrant CT imaging. repeat ABG is recommended.  Patient may remain on the floor as there is no ICU needs at this time. however if the patient's status changes or you do have any questions please don't hesitate to contact us.    addendum: on re eval, pt is again hyperglycemic and disoriented.  transferred to SICU for closer monitoring given increased O2 requirements.  cont to monitor for mental status change  repeat labs  insulin correction  lasix 80 mg, crackles bilat and didn't respond to 20mg lasix IV  will do bedside echo for volume assessment

## 2022-04-08 NOTE — PROGRESS NOTE ADULT - SUBJECTIVE AND OBJECTIVE BOX
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    INTERVAL HISTORY: transferred to the ICU after developing lethargy and confusion in the setting of a COPD exacerbation with acute hypoxic respiratory failure and hyperglycemia; the patient is now awake and alert eating breakfast; he has no shortness of breath or hypoxemia on a 50% high flow nasal canula; decreasing cough but still with difficulty expectorating mucous; improved chest congestion and wheeze; no fevers, chills or sweats; no chest pain/pressure or palpitations; he is still having severe right lower extremity pain; CXR is with a small left pleural effusion with atelectasis - there is no evidence of pulmonary edema (given diuretics x2 in the ICU thus far and has developed KARLOS); ABG is without an acute respiratory acidosis and with adequate oxygenation    REVIEW OF SYSTEMS:  Constitutional: As per interval history  HEENT: Within normal limits  CV: As per interval history  Resp: As per interval history  GI: Within normal limits   : KARLOS  Musculoskeletal: s/p right BKA  Skin: Within normal limits  Neurological: Within normal limits  Psychiatric: Within normal limits  Endocrine: Within normal limits  Hematologic/Lymphatic: Within normal limits  Allergic/Immunologic: Within normal limits    MEDICATIONS:     Pulmonary "  albuterol/ipratropium for Nebulization 3 milliLiter(s) Nebulizer <User Schedule>  buDESOnide    Inhalation Suspension 0.5 milliGRAM(s) Inhalation every 12 hours  guaiFENesin ER 1200 milliGRAM(s) Oral every 12 hours  montelukast 10 milliGRAM(s) Oral daily    Anti-microbials:  cefTRIAXone   IVPB 1000 milliGRAM(s) IV Intermittent every 24 hours  cefTRIAXone   IVPB        Cardiovascular:  losartan 100 milliGRAM(s) Oral daily  metolazone 2.5 milliGRAM(s) Oral <User Schedule>  metoprolol succinate ER 25 milliGRAM(s) Oral daily  tamsulosin 0.8 milliGRAM(s) Oral at bedtime    Other:  acetaminophen     Tablet .. 975 milliGRAM(s) Oral every 6 hours  aspirin  chewable 81 milliGRAM(s) Oral daily  atorvastatin 40 milliGRAM(s) Oral at bedtime  finasteride 5 milliGRAM(s) Oral daily  gabapentin 300 milliGRAM(s) Oral every 8 hours  heparin  Infusion 1200 Unit(s)/Hr IV Continuous <Continuous>  insulin regular Infusion 3 Unit(s)/Hr IV Continuous <Continuous>  levothyroxine 25 MICROGram(s) Oral daily  methylPREDNISolone sodium succinate Injectable 20 milliGRAM(s) IV Push every 6 hours  multivitamin/minerals 1 Tablet(s) Oral daily  pantoprazole    Tablet 40 milliGRAM(s) Oral before breakfast    OBJECTIVE:    Daily Weight in k.9 (2022 02:00)    POCT Blood Glucose.: 169 mg/dL (2022 08:08)  POCT Blood Glucose.: 205 mg/dL (2022 06:57)  POCT Blood Glucose.: 252 mg/dL (2022 05:58)  POCT Blood Glucose.: 270 mg/dL (2022 05:02)  POCT Blood Glucose.: 301 mg/dL (2022 04:01)  POCT Blood Glucose.: 328 mg/dL (2022 02:58)  POCT Blood Glucose.: 328 mg/dL (2022 01:57)  POCT Blood Glucose.: 305 mg/dL (2022 01:08)  POCT Blood Glucose.: 361 mg/dL (2022 23:20)  POCT Blood Glucose.: 366 mg/dL (2022 21:54)  POCT Blood Glucose.: 364 mg/dL (2022 21:53)  POCT Blood Glucose.: 247 mg/dL (2022 18:07)  POCT Blood Glucose.: 199 mg/dL (2022 13:25)  POCT Blood Glucose.: 187 mg/dL (2022 09:56)      PHYSICAL EXAM:       ICU Vital Signs Last 24 Hrs  T(C): 37.2 (2022 03:00), Max: 37.8 (2022 11:11)  T(F): 99 (2022 03:00), Max: 100 (2022 11:11)  HR: 80 (2022 07:00) (67 - 92)  BP: 116/56 (2022 07:00) (100/44 - 158/65)  BP(mean): 81 (2022 07:00) (81 - 101)  ABP: --  ABP(mean): --  RR: 17 (2022 07:00) (17 - 33)  SpO2: 94% (2022 07:00) (91% - 100%) on 50% high flow nasal canula     General: Awake. Alert. Cooperative. No distress. Appears stated age. Obese  HEENT: Atraumatic. Normocephalic. Anicteric. Normal oral mucosa. PERRL. EOMI.  Neck: Supple. Trachea midline. Thyroid without enlargement/tenderness/nodules. No carotid bruit. No JVD.	  Cardiovascular: Regular rate and rhythm. Distant S1 S2. No murmurs, rubs or gallops.  Respiratory: Using accessory muscles of respiration. Improved bilateral rhonchi and wheeze. No curvature.  Abdomen: Soft. Non-tender. Non-distended. No organomegaly. No masses. Normal bowel sounds.  Extremities: Warm to touch. No clubbing or cyanosis. No pedal edema. Right BKA  Pulses: Decreased peripheral pulses LLE  Skin: Venous stasis changes left lower extremity  Lymph Nodes: Cervical, supraclavicular and axillary nodes normal  Neurological: Motor and sensory examination equal and normal. A and O x 3  Psychiatry: Appropriate mood and affect.    LABS:                          7.4    17.02 )-----------( 240      ( 2022 07:55 )             25.3     CBC    WBC  17.02 <==, 10.58 <==, 11.16 <==, 10.93 <==, 9.15 <==, 7.49 <==, 6.99 <==    Hemoglobin  7.4 <<==, 7.7 <<==, 7.3 <<==, 7.4 <<==, 7.2 <<==, 7.4 <<==, 7.7 <<==    Hematocrit  25.3 <==, 26.4 <==, 25.2 <==, 26.1 <==, 25.9 <==, 26.2 <==, 26.5 <==    Platelets  240 <==, 231 <==, 242 <==, 228 <==, 230 <==, 237 <==, 250 <==      141  |  102  |  78<H>  ----------------------------<  222<H>    04-08  4.0   |  24  |  2.28<H>      LYTES    sodium  141 <==, 138 <==, 141 <==, 144 <==, 143 <==, 148 <==, 146 <==    potassium   4.0 <==, 4.6 <==, 4.2 <==, 4.4 <==, 4.2 <==, 3.9 <==, 3.5 <==    chloride  102 <==, 99 <==, 102 <==, 107 <==, 105 <==, 107 <==, 104 <==    carbon dioxide  24 <==, 21 <==, 25 <==, 29 <==, 28 <==, 28 <==, 32 <==    =============================================================================================  RENAL FUNCTION:    Creatinine:   2.28  <<==, 2.25  <<==, 1.89  <<==, 1.68  <<==, 1.56  <<==, 1.52  <<==, 1.48  <<==    BUN:   78 <==, 71 <==, 60 <==, 49 <==, 41 <==, 43 <==, 39 <==    ============================================================================================    calcium   9.1 <==, 9.1 <==, 9.0 <==, 8.7 <==, 8.6 <==, 9.0 <==, 9.2 <==    phos   4.2 <==, 5.0 <==, 3.4 <==, 3.4 <==, 3.5 <==, 3.3 <==, 3.0 <==    mag   2.4 <==, 2.4 <==, 2.4 <==, 2.4 <==, 2.2 <==, 2.2 <==, 2.1 <==    ============================================================================================  PT/INR - ( 2022 07:55 )   PT: 13.8 sec;   INR: 1.20 ratio       PTT - ( 2022 07:55 )  PTT:27.6 sec    ABG - ( 2022 06:20 )  pH: 7.47  /  pCO2: 38    /  pO2: 67    / HCO3: 28    / Base Excess: 3.8   /  SaO2: 94.3      ABG - ( 2022 23:24 )  pH: 7.43  /  pCO2: 36    /  pO2: 79    / HCO3: 24    / Base Excess: -0.1  /  SaO2: 97.2      ABG - ( 2022 18:53 )  pH: 7.44  /  pCO2: 39    /  pO2: 61    / HCO3: 26    / Base Excess: 2.2   /  SaO2: 92.4      < from: TTE with Doppler (w/Cont) (22 @ 15:07) >    Patient name: Martir Heart  YOB: 1935   Age: 86 (M)   MR#: [id number]  Study Date: 4/3/2022  Location: [address] [place], [place] [id number]: Angie Olivarez RDCS  Study quality: Technically difficult  Referring Physician: Anmol Quinn MD  BloodPressure: 115/70 mmHg  Height: 173 cm  Weight: 92 kg  BSA: 2.1 m2  ------------------------------------------------------------------------  PROCEDURE: Transthoracic echocardiogram with 2-D, M-Mode  and complete spectral and color flow Doppler. Verbal  consent was obtained for injection of  Ultrasonic Enhancing  Agent following a discussion of risks and benefits.  Following intravenous injection of Ultrasonic Enhancing  Agent, harmonic imaging was performed.  INDICATION: Cardiomyopathy, unspecified (I42.9)  ------------------------------------------------------------------------  Dimensions:    Normal Values:  LA:     3.1    2.0 - 4.0 cm  Ao:     3.5    2.0 - 3.8 cm  SEPTUM: 1.1    0.6 - 1.2 cm  PWT:    1.3    0.6 - 1.1 cm  LVIDd:  4.3    3.0- 5.6 cm  LVIDs:  3.2    1.8 - 4.0 cm  Derived variables:  LVMI: 90 g/m2  RWT: 0.60  Fractional short: 26 %  EF (Visual Estimate): NWV %  Doppler Peak Velocity (m/sec): MV=1.6 AoV=1.4  ------------------------------------------------------------------------  Observations:  Mitral Valve: Mitral valve not well visualized. Mitral  annular calcification. Peak mitral valve gradient equals 10  mm Hg, mean transmitral valve gradient equals 4 mm Hg,  consistent with mild mitral stenosis.  Aortic Valve/Aorta: Aortic valve not well visualized;  appears calcified. Peak transaortic valve gradient equals 8  mm Hg, mean transaortic valve gradient equals 3 mm Hg,  estimated aortic valve area equals 2 sqcm (by continuity  equation), aortic valve velocity time integral equals 22  cm, consistent with mild aortic stenosis. Peak left  ventricular outflow tract gradient equals 3 mm Hg, mean  gradient is equal to 1 mm Hg, LVOT velocity time integral  equals 12 cm.  Aortic Root: 3.5 cm.  Left Atrium: Normal left atrium.  Left Ventricle: Endocardial visualization enhanced with  intravenous injection of Ultrasonic Enhancing Agent  (Definity). Mild left ventricular systolic dysfunction. The  inferior wall, and the inferoseptum are hypokinetic.  Normal left ventricular internal dimensions and wall  thicknesses. Unable to evaluate diastology.  Right Heart: A device wire is noted in the right heart. The  right ventricle is not well visualized; grossly normal  right ventricular systolic function. Tricuspid valve not  well visualized. Pulmonic valve not well visualized,  probably normal.  Pericardium/Pleura: Normal pericardium with trace  pericardial effusion.  Hemodynamic: Estimated right atrial pressure is 8 mm Hg.  ------------------------------------------------------------------------  Conclusions:  1. Aortic valve not well visualized; appears calcified.  Peak transaortic valve gradient equals 8 mm Hg, mean  transaortic valve gradient equals 3 mm Hg, estimated aortic  valve area equals 2 sqcm (by continuity equation), aortic  valve velocity time integral equals 22 cm, consistent with  mild aortic stenosis.  2. Endocardial visualization enhanced with intravenous  injection of Ultrasonic Enhancing Agent (Definity). Mild  left ventricular systolic dysfunction. The inferior wall,  and the inferoseptum are hypokinetic.  3. The right ventricle is not well visualized; grossly  normal right ventricular systolic function.  ------------------------------------------------------------------------  Confirmed on  4/3/2022 - 17:12:14 by BRITTANY Giraldo  ------------------------------------------------------------------------  --------------------------------------------------------------------------------------------------------------  ---------------------------------------------------------------------------------------------------------------  MICROBIOLOGY:     COVID-19 PCR . (22 @ 18:23)   COVID-19 PCR: NotDetec:       RADIOLOGY:  [x ] Chest radiographs reviewed and interpreted by me    EXAM:  XR CHEST PORTABLE URGENT 1V                          PROCEDURE DATE:  2022      FINDINGS:  Left chest wall dual-lead pacemaker. Rotated exam limits assessment for   lead tip.    Small left pleural effusion with adjacent opacity. No pneumothorax.    Cardiac size cannot accurately be assessed in this projection.  Aortic   calcifications.      IMPRESSION: Small left pleural effusion with adjacent atelectasis   obscuring evaluation of the underlying lung.    DER SANCHEZ MD; Resident Radiology  This document has been electronically signed.  WAGNER LAM MD; Attending Radiologist  This document has been electronically signed. 2022  5:54PM  ---------------------------------------------------------------------------------------------------------------  EXAM:  XR CHEST PORTABLE URGENT 1V                          PROCEDURE DATE:  2022      FINDINGS:  Left chest wall dual-lead pacemaker.  The heart is normal in size.  The lungs are clear.  There is no pneumothorax or pleural effusion.    IMPRESSION:  Clear lungs.    KENA CARRINGTON MD; Resident Radiologist  This document has been electronically signed.  SATURNINO CHERRY MD; Attending Radiologist  This document has been electronically signed. 2022 11:40AM  ---------------------------------------------------------------------------------------------------------------  EXAM:  XR CHEST PORTABLE URGENT 1V                          PROCEDURE DATE:  2022      FINDINGS:    Appropriate course of dual-chamber pacemaker. Unremarkable   cardiomediastinal silhouette. Minimal left basilar atelectasis. No   pleural effusion or pneumothorax.      IMPRESSION:    Mild left basilar atelectasis.    JOSEPH GAMINO M.D., ATTENDING RADIOGIST  This document has been electronically signed. Apr  3 2022  9:00AM  ---------------------------------------------------------------------------------------------------------------

## 2022-04-08 NOTE — PROGRESS NOTE ADULT - SUBJECTIVE AND OBJECTIVE BOX
HPI:  86y M with Hx DM, HTN, COPD, and HLD who presented with RLE pain (s/p angio 9/2021), found to have wet gangrene s/p R laura JAMES 4/4. Post-op course c/b severe COPD exacerbation requiring HFNC. SICU consulted for respiratory monitoring.     24 HOUR EVENTS:  - Started on HFNC for desaturation on the floor  - Diuresed with Lasix 20mg x1, then 80mg x1   - Adair placed for strict I/O monitoring   - Started on insulin gtt    SUBJECTIVE/ROS:  Patient seen at bedside this AM. Denies nausea, vomiting, chest pain, or dizziness.     OBJECTIVE:    VITALS:  Vital Signs Last 24 Hrs  T(C): 37.2 (08 Apr 2022 03:00), Max: 37.8 (07 Apr 2022 11:11)  T(F): 99 (08 Apr 2022 03:00), Max: 100 (07 Apr 2022 11:11)  HR: 84 (08 Apr 2022 04:00) (67 - 98)  BP: 138/64 (08 Apr 2022 04:00) (100/44 - 158/65)  BP(mean): 92 (08 Apr 2022 04:00) (89 - 101)  RR: 18 (08 Apr 2022 04:00) (18 - 33)  SpO2: 95% (08 Apr 2022 04:00) (91% - 100%)    I&O's Summary    06 Apr 2022 07:01  -  07 Apr 2022 07:00  --------------------------------------------------------  IN: 1122 mL / OUT: 200 mL / NET: 922 mL    07 Apr 2022 07:01  -  08 Apr 2022 04:51  --------------------------------------------------------  IN: 861 mL / OUT: 2105 mL / NET: -1244 mL      PHYSICAL EXAM:    NEURO  Exam: awake, alert, oriented    RESPIRATORY  Exam: no increased WOB on HFNC 40L/50%    CARDIOVASCULAR  Exam: warm, well-perfused; RLE BKA dressing with sanguineous strikethrough   Cardiac Rhythm: AF noted on cardiac monitor    GI/NUTRITION  Exam: soft, non-distended, non-tender    GENITOURINARY  Exam: Adair catheter in place, draining clear yellow urine     ACCESS DEVICES:  [2] Peripheral IV  [ ] Central Venous Line	[ ] R	[ ] L	[ ] IJ	[ ] Fem	[ ] SC	Placed:   [ ] Arterial Line		[ ] R	[ ] L	[ ] Fem	[ ] Rad	[ ] Ax	Placed:   [ ] PICC:					[ ] Mediport  [x] Urinary Catheter, Date Placed: 4/7  [x] Necessity of urinary, arterial, and venous catheters discussed      LABS:             7.7    10.58 )-----------( 231      ( 07 Apr 2022 23:25 )             26.4     138  |  99  |  71<H>  ----------------------------<  343<H>  4.6   |  21<L>  |  2.25<H>    Ca    9.1      07 Apr 2022 23:25  Phos  5.0     04-07  Mg     2.4     04-07    POCT Blood Glucose.: 301 mg/dL (08 Apr 2022 04:01)  POCT Blood Glucose.: 328 mg/dL (08 Apr 2022 02:58)  POCT Blood Glucose.: 328 mg/dL (08 Apr 2022 01:57)  POCT Blood Glucose.: 305 mg/dL (08 Apr 2022 01:08)  POCT Blood Glucose.: 361 mg/dL (07 Apr 2022 23:20)  POCT Blood Glucose.: 366 mg/dL (07 Apr 2022 21:54)  POCT Blood Glucose.: 364 mg/dL (07 Apr 2022 21:53)  POCT Blood Glucose.: 247 mg/dL (07 Apr 2022 18:07)  POCT Blood Glucose.: 199 mg/dL (07 Apr 2022 13:25)  POCT Blood Glucose.: 187 mg/dL (07 Apr 2022 09:56)      MEDICATIONS:   MEDICATIONS  (STANDING):  acetaminophen     Tablet .. 975 milliGRAM(s) Oral every 6 hours  albuterol/ipratropium for Nebulization 3 milliLiter(s) Nebulizer <User Schedule>  aspirin  chewable 81 milliGRAM(s) Oral daily  atorvastatin 40 milliGRAM(s) Oral at bedtime  buDESOnide    Inhalation Suspension 0.5 milliGRAM(s) Inhalation every 12 hours  cefTRIAXone   IVPB 1000 milliGRAM(s) IV Intermittent every 24 hours  cefTRIAXone   IVPB      finasteride 5 milliGRAM(s) Oral daily  gabapentin 300 milliGRAM(s) Oral every 8 hours  guaiFENesin ER 1200 milliGRAM(s) Oral every 12 hours  heparin  Infusion 1200 Unit(s)/Hr (16 mL/Hr) IV Continuous <Continuous>  insulin regular Infusion 3 Unit(s)/Hr (3 mL/Hr) IV Continuous <Continuous>  levothyroxine 25 MICROGram(s) Oral daily  losartan 100 milliGRAM(s) Oral daily  methylPREDNISolone sodium succinate Injectable 20 milliGRAM(s) IV Push every 6 hours  metolazone 2.5 milliGRAM(s) Oral <User Schedule>  metoprolol succinate ER 25 milliGRAM(s) Oral daily  montelukast 10 milliGRAM(s) Oral daily  multivitamin/minerals 1 Tablet(s) Oral daily  pantoprazole    Tablet 40 milliGRAM(s) Oral before breakfast  tamsulosin 0.8 milliGRAM(s) Oral at bedtime

## 2022-04-08 NOTE — PROGRESS NOTE ADULT - ASSESSMENT
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 Post-op course c/b severe COPD exacerbation requiring HFNC. SICU consulted for respiratory monitoring.     PLAN:   Neuro: post-op pain  - Pain control: Tylenol ATC, gabapentin,     Respiratory: COPD exacerbation   - HFNC 40L/50%   - Monitor respiratory status  - Duonebs, mucinex, pulmicort, montelukast, methylprenidsolone 20mg IV q6h for COPD exacerbation    Cardiovascular: h/o HTN, HLD, AF  - Monitor vitals signs  - C/w home losartan, metoprolol, metolazone for BP control  - C/w home atorvastatin for HLD    Gastrointestinal: no active issues   - Diet: Regular    Genitourinary/Renal: h/o urinary retention  - IVL'd  - C/w home flomax, finasteride  - Monitor UOP, Adair for strict I/O monitoring  - Trend electrolytes on BMP qD, replete PRN    Heme: no active issues   - Hep gtt for AF, monitor PTT qD  - ASA 81  - Trend H/H on CBC qD    ID: RLE wet gangrene s/p guillotine R BKA 4/4  - Abx: ceftriaxone  - Trend WBC on CBC qD  - Monitor fever curve    Endocrine: h/o DM, hypothyroidism  - Insulin gtt, wean as tolerated  - C/w home Synthroid 25mcg PO qD    Lines:   - PIV x 2  - Adair    Code Status: Full Code  Dispo: RICHARD Santiago, PGY-2  Surgical ICU  #[id number]

## 2022-04-08 NOTE — PROGRESS NOTE ADULT - SUBJECTIVE AND OBJECTIVE BOX
Ritesh Bell MD  Interventional Cardiology / Endovascular Specialist  [place] Office : [phone] [place] [address] [place] [address]  Tel:   [place] Office : [address] [facility] [place] [address]  Tel: [phone]    Pt is lying in bed better MS on HFNC transferred to SICU     	  MEDICATIONS:  heparin  Infusion 1200 Unit(s)/Hr IV Continuous <Continuous>  metoprolol succinate ER 25 milliGRAM(s) Oral daily  tamsulosin 0.8 milliGRAM(s) Oral at bedtime    cefTRIAXone   IVPB      cefTRIAXone   IVPB 1000 milliGRAM(s) IV Intermittent every 24 hours    albuterol/ipratropium for Nebulization 3 milliLiter(s) Nebulizer <User Schedule>  buDESOnide    Inhalation Suspension 0.5 milliGRAM(s) Inhalation every 12 hours  guaiFENesin ER 1200 milliGRAM(s) Oral every 12 hours  montelukast 10 milliGRAM(s) Oral daily    acetaminophen     Tablet .. 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    Deep Vein Thrombosis (DVT)  17 yeras ago on Coumadin    CHF (congestive heart failure)  last exacerbation in 1/2017    Enlarged prostate    GERD (gastroesophageal reflux disease)    Hernia  umblical    Calculus of bile duct without cholangitis or cholecystitis without obstruction    Atrial fibrillation    S/P Hernia Repair    S/P cataract surgery, unspecified laterality    S/P ERCP  3/2017        SOCIAL HISTORY: Substance Use (street drugs): ( x ) never used  (  ) other:    FAMILY HISTORY:      REVIEW OF SYSTEMS:  CONSTITUTIONAL: No fever, weight loss, or fatigue  EYES: No eye pain, visual disturbances, or discharge  ENMT:  No difficulty hearing, tinnitus, vertigo; No sinus or throat pain  BREASTS: No pain, masses, or nipple discharge  GASTROINTESTINAL: No abdominal or epigastric pain. No nausea, vomiting, or hematemesis; No diarrhea or constipation. No melena or hematochezia.  GENITOURINARY: No dysuria, frequency, hematuria, or incontinence  NEUROLOGICAL: No headaches, memory loss, loss of strength, numbness, or tremors  ENDOCRINE: No heat or cold intolerance; No hair loss  MUSCULOSKELETAL: No joint pain or swelling; No muscle, back, or extremity pain  PSYCHIATRIC: No depression, anxiety, mood swings, or difficulty sleeping  HEME/LYMPH: No easy bruising, or bleeding gums  All others negative    PHYSICAL EXAM:  T(C): 37.3 (04-08-22 @ 19:00), Max: 37.3 (04-08-22 @ 08:00)  HR: 81 (04-08-22 @ 22:01) (68 - 90)  BP: 121/60 (04-08-22 @ 22:00) (92/61 - 158/65)  RR: 16 (04-08-22 @ 22:00) (13 - 32)  SpO2: 98% (04-08-22 @ 22:01) (87% - 99%)  Wt(kg): --  I&O's Summary    07 Apr 2022 07:01  -  08 Apr 2022 07:00  --------------------------------------------------------  IN: 1056 mL / OUT: 2430 mL / NET: -1374 mL    08 Apr 2022 07:01  -  08 Apr 2022 23:01  --------------------------------------------------------  IN: 1233.5 mL / OUT: 855 mL / NET: 378.5 mL        GENERAL: NAD  EYES:   PERRLA   ENMT:   Moist mucous membranes, Good dentition, No lesions  Cardiovascular: Normal S1 S2, No JVD, No murmurs, No edema  Respiratory: scattered wheezing   Gastrointestinal:  Soft, Non-tender, + BS	  Extremities: right bka                             7.4    17.02 )-----------( 240      ( 08 Apr 2022 07:55 )             25.3     04-08    140  |  102  |  88<H>  ----------------------------<  194<H>  4.4   |  24  |  2.44<H>    Ca    9.2      08 Apr 2022 16:26  Phos  4.2     04-08  Mg     2.4     04-08      proBNP:   Lipid Profile:   HgA1c:   TSH:     Consultant(s) Notes Reviewed:  [x ] YES  [ ] NO    Care Discussed with Consultants/Other Providers [ x] YES  [ ] NO    Imaging Personally Reviewed independently:  [x] YES  [ ] NO    All labs, radiologic studies, vitals, orders and medications list reviewed. Patient is seen and examined at bedside. Case discussed with medical team.

## 2022-04-08 NOTE — CONSULT NOTE ADULT - PROBLEM SELECTOR RECOMMENDATION 9
Diabetes Education and Nutrition Eval  Glucose and insulin requirements are extremely variable with high doses of steroids. Would continue with insulin gtt for now and assess insulin requirements once more stable.   Goal glucose 100-180  Outpt. endo follow-up  Outpt. optho, podiatry, nephrology  Plan to d/c on basal + orals including SGLT2 given CKD. Would avoid metformin and sulfonylurea.

## 2022-04-08 NOTE — PROGRESS NOTE ADULT - CRITICAL CARE ATTENDING COMMENT
I have personally seen and examined the patient.  I fully participated in the care of this patient.  I have made amendments to the documentation where necessary, and agree with the history, physical exam, and plan as documented by the Resident.     86M admitted to the unit overnight for COPD exacerbation POD 3 s/p R laura JAMES. Respiratory status improved s/p 80 of lasix and HiFLO nasal cannula.     A&Ox4. Pain controlled with Tylenol and gabapentin  COPD exacerbation improving s/p nebs, steroids, 80 lasix and currently on HiFlO at 40/45%. Will titrate to SpO2 > 92% according to pulmonary recs  Tolerating regular diet   Hx of Afib on home metoprolol   KARLOS - Cr 2.28 from 1.8. Will continue to trend.  On heparin drip for hx of Afib on coumadin at home. Plan to return to OR for formal RLE closure next week.   On ceftriaxone for COPD exacerbation. Afebrile, WBC 10  Hx of diabetes, on insulin drip at 10. Will obtain endocrine consult. I have personally seen and examined the patient.  I fully participated in the care of this patient.  I have made amendments to the documentation where necessary, and agree with the history, physical exam, and plan as documented by the Resident.     86M admitted to the unit overnight for COPD exacerbation POD 3 s/p R laura JAMES. Respiratory status improved s/p 80 of lasix and HiFLO nasal cannula.     A&Ox4. Pain controlled with Tylenol and gabapentin  COPD exacerbation requiring high degree of supplemental oxygen   s/p nebs, steroids, 80 lasix and currently on HiFlO at 40/45%. Will titrate to SpO2 > 92% according to pulmonary recs  Tolerating regular diet   Hx of Afib on home metoprolol   KARLOS - Cr 2.28 from 1.8. Will continue to trend.  On heparin drip for hx of Afib on coumadin at home. Plan to return to OR for formal RLE closure next week.   On ceftriaxone for COPD exacerbation. Afebrile, WBC 10  Hx of diabetes, on insulin drip for hyperglycemia in the setting of critical illness. Will obtain endocrine consult.

## 2022-04-08 NOTE — CONSULT NOTE ADULT - SUBJECTIVE AND OBJECTIVE BOX
HPI:  87 y/o M w/ hx of Type 2 DM x 25 years complicated by neuropathy, nephropathy, cataract. At home on Lantus 28 units    HPI: Mr. Heart is an 86 year old gentleman with a PMH DM, HTN, HLD who has been followed for the past 6 months for foot wounds and leg pain. He underwent and angiogram with Dr. Quinn in september of 2021 and has been followed in the office. He reports having pain in the RLE mostly in the toes and has open wounds with some purulence and gangrene. The pain is worse at night when lying in bed, and improves with tylenol and dangling the foot off the bed. He is non-ambulatory at home.  He presents today from the office for preoperative planning and optimization for a RLE AKA on Monday 4/4 with Dr. Quinn.      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 975 milliGRAM(s) Oral every 6 hours  albuterol/ipratropium for Nebulization 3 milliLiter(s) Nebulizer <User Schedule>  atorvastatin 40 milliGRAM(s) Oral at bedtime  buDESOnide    Inhalation Suspension 0.5 milliGRAM(s) Inhalation every 12 hours  cefTRIAXone   IVPB 1000 milliGRAM(s) IV Intermittent every 24 hours  cefTRIAXone   IVPB      finasteride 5 milliGRAM(s) Oral daily  gabapentin 300 milliGRAM(s) Oral every 8 hours  guaiFENesin ER 1200 milliGRAM(s) Oral every 12 hours  heparin  Infusion 1200 Unit(s)/Hr (20 mL/Hr) IV Continuous <Continuous>  insulin regular Infusion 3 Unit(s)/Hr (3 mL/Hr) IV Continuous <Continuous>  levothyroxine 25 MICROGram(s) Oral daily  losartan 100 milliGRAM(s) Oral daily  methylPREDNISolone sodium succinate Injectable 20 milliGRAM(s) IV Push every 6 hours  metoprolol succinate ER 25 milliGRAM(s) Oral daily  montelukast 10 milliGRAM(s) Oral daily  multivitamin/minerals 1 Tablet(s) Oral daily  pantoprazole    Tablet 40 milliGRAM(s) Oral before breakfast  tamsulosin 0.8 milliGRAM(s) Oral at bedtime    MEDICATIONS  (PRN):      Allergies    No Known Allergies    Intolerances      Review of Systems:  Constitutional: No fever, No change in weight  Eyes: No blurry vision  Neuro: No headache, No paresthesias  HEENT: No throat pain  Cardiovascular: No chest pain  Respiratory: No SOB  GI: No nausea or vomiting  : No polyuria  Skin: no rash  Psych: no depression  Endocrine: No polydipsia, No heat or cold intolerance, rest as noted in HPI  Hem/lymph: no swelling    All other review of systems negative      PHYSICAL EXAM:  VITALS: T(C): 37.3 (04-08-22 @ 19:00)  T(F): 99.1 (04-08-22 @ 19:00), Max: 99.1 (04-08-22 @ 08:00)  HR: 82 (04-08-22 @ 19:00) (68 - 92)  BP: 114/58 (04-08-22 @ 19:00) (112/58 - 158/65)  RR:  (13 - 33)  SpO2:  (87% - 99%)  Wt(kg): --  GENERAL: NAD at this time  EYES: No proptosis, EOMI  HEENT:  Atraumatic, Normocephalic,   THYROID: Normal size, no palpable nodules  RESPIRATORY: Clear to auscultation bilaterally, full excursion, non-labored  CARDIOVASCULAR: Regular rhythm; No murmurs; no peripheral edema  GI: Soft, nontender, non distended, normal bowel sounds  SKIN: Dry, intact, No rashes or lesions  MUSCULOSKELETAL: normal strength  NEURO: follows commands, no tremor, normal reflexes  PSYCH: Alert and oriented x 3, normal affect, normal mood  CUSHING'S SIGNS: no striae      POCT Blood Glucose.: 349 mg/dL (04-08-22 @ 20:01)  POCT Blood Glucose.: 299 mg/dL (04-08-22 @ 18:56)  POCT Blood Glucose.: 247 mg/dL (04-08-22 @ 18:01)  POCT Blood Glucose.: 208 mg/dL (04-08-22 @ 17:04)  POCT Blood Glucose.: 204 mg/dL (04-08-22 @ 15:58)  POCT Blood Glucose.: 181 mg/dL (04-08-22 @ 15:24)  POCT Blood Glucose.: 155 mg/dL (04-08-22 @ 14:10)  POCT Blood Glucose.: 93 mg/dL (04-08-22 @ 12:57)  POCT Blood Glucose.: 87 mg/dL (04-08-22 @ 11:58)  POCT Blood Glucose.: 121 mg/dL (04-08-22 @ 11:08)  POCT Blood Glucose.: 153 mg/dL (04-08-22 @ 10:10)  POCT Blood Glucose.: 165 mg/dL (04-08-22 @ 09:10)  POCT Blood Glucose.: 169 mg/dL (04-08-22 @ 08:08)  POCT Blood Glucose.: 205 mg/dL (04-08-22 @ 06:57)  POCT Blood Glucose.: 252 mg/dL (04-08-22 @ 05:58)  POCT Blood Glucose.: 270 mg/dL (04-08-22 @ 05:02)  POCT Blood Glucose.: 301 mg/dL (04-08-22 @ 04:01)  POCT Blood Glucose.: 328 mg/dL (04-08-22 @ 02:58)  POCT Blood Glucose.: 328 mg/dL (04-08-22 @ 01:57)  POCT Blood Glucose.: 305 mg/dL (04-08-22 @ 01:08)  POCT Blood Glucose.: 361 mg/dL (04-07-22 @ 23:20)  POCT Blood Glucose.: 366 mg/dL (04-07-22 @ 21:54)  POCT Blood Glucose.: 364 mg/dL (04-07-22 @ 21:53)  POCT Blood Glucose.: 247 mg/dL (04-07-22 @ 18:07)  POCT Blood Glucose.: 199 mg/dL (04-07-22 @ 13:25)  POCT Blood Glucose.: 187 mg/dL (04-07-22 @ 09:56)  POCT Blood Glucose.: 283 mg/dL (04-06-22 @ 21:19)  POCT Blood Glucose.: 278 mg/dL (04-06-22 @ 17:55)  POCT Blood Glucose.: 268 mg/dL (04-06-22 @ 13:38)  POCT Blood Glucose.: 147 mg/dL (04-06-22 @ 09:05)  POCT Blood Glucose.: 266 mg/dL (04-05-22 @ 21:20)                              7.4    17.02 )-----------( 240      ( 08 Apr 2022 07:55 )             25.3       04-08    140  |  102  |  88<H>  ----------------------------<  194<H>  4.4   |  24  |  2.44<H>    EGFR if : x   EGFR if non : x     Ca    9.2      04-08  Mg     2.4     04-08  Phos  4.2     04-08      Thyroid Function Tests:            Radiology:                  HPI:  87 y/o M w/ hx of Type 2 DM x 25 years complicated by neuropathy, nephropathy, cataract. At home on Lantus 28 units, metformin and glipizide. Managed by PCP. Glucose values at home generally 90's-120 although he does have symptoms in the morning of hypoglycemia, but does not check. Wife will empirically treat with juice or food and symptoms improve. Eats a lot of carbs but in small portions. Sister with Type 2 DM. No polyuria or polydipsia. No nausea or vomiting. Remains on insulin gtt on high dose steroids with hyperglycemia.     Hx of hypothyroidism on levothyroxine 25 mcg daily    Also hx of HTN, HLD who has been followed for the past 6 months for foot wounds and leg pain. He underwent and angiogram with Dr. Quinn in september of 2021 and has been followed in the office. He reports having pain in the RLE mostly in the toes and has open wounds with some purulence and gangrene. The pain is worse at night when lying in bed, and improves with tylenol and dangling the foot off the bed. He is non-ambulatory at home.  He presents today from the office for preoperative planning and optimization for a RLE AKA on Monday 4/4 with Dr. Quinn.      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 No alcohol use    Outpatient Medications:  · 	acetaminophen 325 mg oral tablet: 2 tab(s) orally every 6 hours, As needed, Mild Pain (1 - 3)  · 	tamsulosin 0.4 mg oral capsule: 2 cap(s) orally once a day (at bedtime)  · 	furosemide 20 mg oral tablet: 1 tab(s) orally 2 times a day  	  · 	aspirin 81 mg oral tablet, chewable: 1 tab(s) orally once a day  · 	omeprazole 40 mg oral delayed release capsule: 1 cap(s) orally once a day  · 	Centrum Silver oral tablet: 1 tab(s) orally once a day  · 	budesonide 0.5 mg/2 mL inhalation suspension: 0.5 milligram(s) inhaled 2 times a day  · 	Crestor 10 mg oral tablet: 1 tab(s) orally once a day (at bedtime)  · 	finasteride 5 mg oral tablet: 1 tab(s) orally once a day  · 	glipiZIDE 10 mg oral tablet: 1 tab(s) orally 2 times a day  · 	montelukast 10 mg oral tablet: 1 tab(s) orally once a day  · 	metOLazone 2.5 mg oral tablet: 1 tab(s) orally 3 times a week  · 	Coumadin 5 mg oral tablet: 1 tab(s) orally once a day (at bedtime)  · 	metformin 500 mg oral tablet: 1 tab(s) orally 2 times a day  · 	telmisartan 80 mg oral tablet: 1 tab(s) orally once a day  · 	metoprolol succinate 25 mg oral capsule, extended release: 1 cap(s) orally once a day  · 	Synthroid 25 mcg (0.025 mg) oral tablet: 1 tab(s) orally once a day    MEDICATIONS  (STANDING):  acetaminophen     Tablet .. 975 milliGRAM(s) Oral every 6 hours  albuterol/ipratropium for Nebulization 3 milliLiter(s) Nebulizer <User Schedule>  atorvastatin 40 milliGRAM(s) Oral at bedtime  buDESOnide    Inhalation Suspension 0.5 milliGRAM(s) Inhalation every 12 hours  cefTRIAXone   IVPB 1000 milliGRAM(s) IV Intermittent every 24 hours  cefTRIAXone   IVPB      finasteride 5 milliGRAM(s) Oral daily  gabapentin 300 milliGRAM(s) Oral every 8 hours  guaiFENesin ER 1200 milliGRAM(s) Oral every 12 hours  heparin  Infusion 1200 Unit(s)/Hr (20 mL/Hr) IV Continuous <Continuous>  insulin regular Infusion 3 Unit(s)/Hr (3 mL/Hr) IV Continuous <Continuous>  levothyroxine 25 MICROGram(s) Oral daily  losartan 100 milliGRAM(s) Oral daily  methylPREDNISolone sodium succinate Injectable 20 milliGRAM(s) IV Push every 6 hours  metoprolol succinate ER 25 milliGRAM(s) Oral daily  montelukast 10 milliGRAM(s) Oral daily  multivitamin/minerals 1 Tablet(s) Oral daily  pantoprazole    Tablet 40 milliGRAM(s) Oral before breakfast  tamsulosin 0.8 milliGRAM(s) Oral at bedtime    MEDICATIONS  (PRN):      Allergies    No Known Allergies    Intolerances      Review of Systems:  Constitutional: No fever, No change in weight  Eyes: +cataract  Neuro: No headache, +neuropathy  HEENT: No throat pain  Cardiovascular: No chest pain  Respiratory: No SOB  GI: No nausea or vomiting  : No polyuria  Skin: no rash  Psych: no depression  Endocrine: No polydipsia, No heat or cold intolerance, rest as noted in HPI  Hem/lymph: no swelling    All other review of systems negative      PHYSICAL EXAM:  VITALS: T(C): 37.3 (04-08-22 @ 19:00)  T(F): 99.1 (04-08-22 @ 19:00), Max: 99.1 (04-08-22 @ 08:00)  HR: 82 (04-08-22 @ 19:00) (68 - 92)  BP: 114/58 (04-08-22 @ 19:00) (112/58 - 158/65)  RR:  (13 - 33)  SpO2:  (87% - 99%)  Wt(kg): --  GENERAL: NAD at this time  EYES: No proptosis, EOMI  HEENT:  Atraumatic, Normocephalic,   THYROID: Normal size, no palpable nodules  RESPIRATORY: Clear to auscultation bilaterally, full excursion, non-labored  CARDIOVASCULAR: Regular rhythm; No murmurs; R AKA  GI: Soft, nontender, non distended, normal bowel sounds  SKIN: Dry, intact  MUSCULOSKELETAL: Right AKA  NEURO: follows commands  PSYCH: Alert and oriented x 3, normal affect, normal mood  CUSHING'S SIGNS: no striae      POCT Blood Glucose.: 349 mg/dL (04-08-22 @ 20:01)  POCT Blood Glucose.: 299 mg/dL (04-08-22 @ 18:56)  POCT Blood Glucose.: 247 mg/dL (04-08-22 @ 18:01)  POCT Blood Glucose.: 208 mg/dL (04-08-22 @ 17:04)  POCT Blood Glucose.: 204 mg/dL (04-08-22 @ 15:58)  POCT Blood Glucose.: 181 mg/dL (04-08-22 @ 15:24)  POCT Blood Glucose.: 155 mg/dL (04-08-22 @ 14:10)  POCT Blood Glucose.: 93 mg/dL (04-08-22 @ 12:57)  POCT Blood Glucose.: 87 mg/dL (04-08-22 @ 11:58)  POCT Blood Glucose.: 121 mg/dL (04-08-22 @ 11:08)  POCT Blood Glucose.: 153 mg/dL (04-08-22 @ 10:10)  POCT Blood Glucose.: 165 mg/dL (04-08-22 @ 09:10)  POCT Blood Glucose.: 169 mg/dL (04-08-22 @ 08:08)  POCT Blood Glucose.: 205 mg/dL (04-08-22 @ 06:57)  POCT Blood Glucose.: 252 mg/dL (04-08-22 @ 05:58)  POCT Blood Glucose.: 270 mg/dL (04-08-22 @ 05:02)  POCT Blood Glucose.: 301 mg/dL (04-08-22 @ 04:01)  POCT Blood Glucose.: 328 mg/dL (04-08-22 @ 02:58)  POCT Blood Glucose.: 328 mg/dL (04-08-22 @ 01:57)  POCT Blood Glucose.: 305 mg/dL (04-08-22 @ 01:08)  POCT Blood Glucose.: 361 mg/dL (04-07-22 @ 23:20)  POCT Blood Glucose.: 366 mg/dL (04-07-22 @ 21:54)  POCT Blood Glucose.: 364 mg/dL (04-07-22 @ 21:53)  POCT Blood Glucose.: 247 mg/dL (04-07-22 @ 18:07)  POCT Blood Glucose.: 199 mg/dL (04-07-22 @ 13:25)  POCT Blood Glucose.: 187 mg/dL (04-07-22 @ 09:56)  POCT Blood Glucose.: 283 mg/dL (04-06-22 @ 21:19)  POCT Blood Glucose.: 278 mg/dL (04-06-22 @ 17:55)  POCT Blood Glucose.: 268 mg/dL (04-06-22 @ 13:38)  POCT Blood Glucose.: 147 mg/dL (04-06-22 @ 09:05)  POCT Blood Glucose.: 266 mg/dL (04-05-22 @ 21:20)                              7.4    17.02 )-----------( 240      ( 08 Apr 2022 07:55 )             25.3       04-08    140  |  102  |  88<H>  ----------------------------<  194<H>  4.4   |  24  |  2.44<H>    EGFR if : x   EGFR if non : x     Ca    9.2      04-08  Mg     2.4     04-08  Phos  4.2     04-08      Thyroid Function Tests:            Radiology:

## 2022-04-08 NOTE — PROGRESS NOTE ADULT - ASSESSMENT
86y male with PMHx of HTN, HLD, DM2 and nonhealing wounds of RLE who is non-ambulatory at home now s/p right guillotine BELOW knee amputation. Postoperative course c/b severe COPD exacerbation requiring excalation of O2 supplementation with HFNC. SICU consulted for close respiratory monitoring in the setting of COPD exacerbation.      PLAN  - heparin gtt  - ceftriaxone  - insulin infusion  - AKA planning next week  - pain control  - PT eval post AKA  - f/u pulm recs  - appreciate SICU care      VASCULAR  [phone]    86y male with PMHx of HTN, HLD, DM2 and nonhealing wounds of RLE who is non-ambulatory at home now s/p right guillotine BELOW knee amputation. Postoperative course c/b severe COPD exacerbation requiring excalation of O2 supplementation with HFNC. SICU consulted for close respiratory monitoring in the setting of COPD exacerbation. Improved saturation and work of breathing, on HFNC.     - heparin gtt  - ceftriaxone  - insulin infusion  - AKA planning next week  - pain control  - PT eval post AKA  - f/u pulm recs  - would suggest wound care for vac placement, premedicate before changes for pain  - appreciate SICU care    VASCULAR  0900

## 2022-04-08 NOTE — PROGRESS NOTE ADULT - ASSESSMENT
86M with PMH of COPD (not on home o2), prior smoking history (40 pack years), HTN, HLD, Afib, CHF, T2DM, CVA, BPH, and PAD admitted for elective RLE AKA. Renal consulted for CKD Mx.    KARLOS on CKD 3,   baseline Cr ~1.7  k and bicarb stable  rise likely 2/2 hemodynamic changes--> ATN  s/p hudson --> 40 to 50cc urineoutput  s/p lasix iv earlier  now in sicu--> monitor strict i/o's--> pt is tolerating po intake--> monitor off fluids  IF cr continues to rise then discontinue losartan  monitor BMP daily and u/o   dose all meds for eGFR  avoid NSAIDs/Nephrotoxics.    HYpernatremia- improved; trend NA  Hypokalemia -magnesium  stable; k stable  HTN, controlled. bp optimal   continue ARB BB. monitor bp    Rt LE nonhealing wound:  Vascular/Cardio f/up noted.  ECHO  awaiting AKA  optimized renal stand point for OR. keep K ~4.0    Dr Knight  [phone]

## 2022-04-08 NOTE — CONSULT NOTE ADULT - ASSESSMENT
87 y/o M w/ uncontrolled Type 2 DM complicated by neuropathy and nephropathy with hyperglycemia exacerbated by steroids, HTN, HLD, hypothyroidism admitted for right LE AKA

## 2022-04-08 NOTE — PROGRESS NOTE ADULT - ASSESSMENT
ASSESSMENT:    86 year old gentleman, former smoker, followed by Dr. Alicja Rob of our practice for asthma/COPD overlap  syndrome and obstructive sleep apnea being treated conservatively. He has no history of TOM colonization/infection as listed in the "past medical history". He has been stable from a pulmonary perspective and maintained on budesonide and duoneb once daily and if needed. He also has a history of HTN, HLD, DM, CKD, CVA, CHF (mild systolic dysfunction) and atrial fibrillation with sick sinus syndrome s/p pacemaker implantation. He has been followed for many months for chronic foot wounds and leg pain now with some purulence and gangrene. The pain is exacerbated when laying in bed and improves with tylenol and when hanging the legs off of the bed. He is no longer able to ambulate. 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 The patient has developed marked shortness of breath with severe hypoxemia currently requiring a nasal canula @ 5lpm to maintain saturation @ 90%. He has a cough with copious mucous in his chest which he is unable to expectorate. He has chest congestion and wheeze. No fevers, chills or sweats. No chest pain/pressure or palpitations. Called by the patient's family and asked to be involved with his pulmonary care.    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 limiting diaphragmatic excursion and respiratory muscle weakness decreasing chest wall expansion -> atelectasis  3) no evidence of pulmonary edema, pleural effusions or pneumonia on the most recent CXR    leukocytosis more likely related to systemic steroids than infection    CKD/KARLOS due to diuresis in the setting of euvolemia    PLAN/RECOMMENDATIONS:    oxygen supplementation to keep saturation greater than 92% to avoid limb ischemia - currently on a 50% high flow nasal canula with borderline oxygenation - may need to increase to improve oxygen delivery to an ischemic limb  no further diuresis  ceftriaxone x 5 days  solumedrol 20mg IVP q6h - glucose control on steroids  albuterol/atrovent nebs q6h  pulmicort 0.5mg nebs q12h - give after duoneb - rinse mouth after use  mucinex 1200mg 2 times daily  singulair 10mg @ bedtime  acapella device/incentive spirometer  cardiac meds: ASA/lipitor/losartan/toprol XL/metolazone (would discontinue)  flomax/proscar  vascular surgery follow-up    respiratory status needs to be further optimized prior to surgery    heparin gtt    needs better pain control  GI prophylaxis - protonix    Will follow with you. Plan of care discussed with the patient at bedside and with the ICU team.    Sarabjit Wright MD, [facility]  [phone]  Pulmonary Medicine

## 2022-04-08 NOTE — PROGRESS NOTE ADULT - ASSESSMENT
EKG -  A sense V paced PVC's  Echo - Mild LV dysfunction mild aortic stenosis    a/p     1) Preop clearance for AKA - pt has h/o moderate LV dysfunction as per echo 2017, no chest pains 2d echo now shows mild LV dysfunction  , 12 lead EKG ok,  PPM interrogated , s/p BKA , pt wheezing b/l f/u pulm recs now on steroids, On HFNC consider CT ctest non con     2) HTN - continue losartan and metoprolol     3) Chronic systolic CHF - euvolemic on exam hold metolazone     4) ?Atrial fib - coumadin on hold on IV heparin     5) KARLOS : would hold losartan and metolazone

## 2022-04-08 NOTE — PROGRESS NOTE ADULT - SUBJECTIVE AND OBJECTIVE BOX
Patient seen and examiend  no complaints      No Known Allergies    Hospital Medications:   MEDICATIONS  (STANDING):  acetaminophen     Tablet .. 975 milliGRAM(s) Oral every 6 hours  albuterol/ipratropium for Nebulization 3 milliLiter(s) Nebulizer <User Schedule>  atorvastatin 40 milliGRAM(s) Oral at bedtime  buDESOnide    Inhalation Suspension 0.5 milliGRAM(s) Inhalation every 12 hours  cefTRIAXone   IVPB 1000 milliGRAM(s) IV Intermittent every 24 hours  cefTRIAXone   IVPB      finasteride 5 milliGRAM(s) Oral daily  gabapentin 300 milliGRAM(s) Oral every 8 hours  guaiFENesin ER 1200 milliGRAM(s) Oral every 12 hours  heparin  Infusion 1200 Unit(s)/Hr (18 mL/Hr) IV Continuous <Continuous>  insulin regular Infusion 3 Unit(s)/Hr (3 mL/Hr) IV Continuous <Continuous>  levothyroxine 25 MICROGram(s) Oral daily  losartan 100 milliGRAM(s) Oral daily  methylPREDNISolone sodium succinate Injectable 20 milliGRAM(s) IV Push every 6 hours  metolazone 2.5 milliGRAM(s) Oral <User Schedule>  metoprolol succinate ER 25 milliGRAM(s) Oral daily  montelukast 10 milliGRAM(s) Oral daily  multivitamin/minerals 1 Tablet(s) Oral daily  pantoprazole    Tablet 40 milliGRAM(s) Oral before breakfast  tamsulosin 0.8 milliGRAM(s) Oral at bedtime      VITALS:  T(F): 98.6 (04-08-22 @ 11:00), Max: 99.1 (04-08-22 @ 08:00)  HR: 68 (04-08-22 @ 12:00)  BP: 124/57 (04-08-22 @ 12:00)  RR: 15 (04-08-22 @ 12:00)  SpO2: 95% (04-08-22 @ 12:00)  Wt(kg): --    04-07 @ 07:01  -  04-08 @ 07:00  --------------------------------------------------------  IN: 1056 mL / OUT: 2430 mL / NET: -1374 mL    04-08 @ 07:01  -  04-08 @ 12:35  --------------------------------------------------------  IN: 428 mL / OUT: 215 mL / NET: 213 mL        PHYSICAL EXAM:  Constitutional: NAD  HEENT: anicteric sclera  Neck: No JVD  Respiratory: CTAB, no wheezes, rales or rhonchi  Cardiovascular: S1, S2, RRR  Gastrointestinal: BS+, soft, NT/ND  Extremities: 3+ peripheral edema b/l; right bka   Neurological: A/O x 3, no focal deficits  Psychiatric: Normal mood, normal affect  :+ indwelling hudson    LABS:  04-08    141  |  102  |  78<H>  ----------------------------<  222<H>  4.0   |  24  |  2.28<H>    Ca    9.1      08 Apr 2022 06:32  Phos  4.2     04-08  Mg     2.4     04-08      Creatinine Trend: 2.28 <--, 2.25 <--, 1.89 <--, 1.68 <--, 1.56 <--, 1.52 <--, 1.48 <--, 1.33 <--                        7.4    17.02 )-----------( 240      ( 08 Apr 2022 07:55 )             25.3     Urine Studies:      RADIOLOGY & ADDITIONAL STUDIES:

## 2022-04-08 NOTE — PROGRESS NOTE ADULT - SUBJECTIVE AND OBJECTIVE BOX
VASCULAR SURGERY PROGRESS NOTE  POD#4 Guillotine amputation below knee      SUBJECTIVE  Pt seen and examined at bedside. No complaints.  No acute events overnight.         OBJECTIVE:    PHYSICAL EXAM   General Appearance: Appears well, NAD  Resp: HFNC 40/40, mildly increased work of breathing, diffuse crackles  CV: RRR  Abdomen: Soft, Nontender, Nondistended  Extremity: RLE BKA with clean dry, intact dressing. No bleeding noted on dressing. Knee immobilizer in place      Vital Signs Last 24 Hrs  T(C): 37.2 (08 Apr 2022 03:00), Max: 37.8 (07 Apr 2022 11:11)  T(F): 99 (08 Apr 2022 03:00), Max: 100 (07 Apr 2022 11:11)  HR: 84 (08 Apr 2022 03:00) (67 - 98)  BP: 152/67 (08 Apr 2022 03:00) (100/44 - 158/65)  BP(mean): 96 (08 Apr 2022 03:00) (89 - 101)  RR: 19 (08 Apr 2022 03:00) (18 - 33)  SpO2: 95% (08 Apr 2022 03:00) (91% - 100%)    I's & O's    04-06-22 @ 07:01  -  04-07-22 @ 07:00  --------------------------------------------------------  IN:    Heparin: 322 mL    Oral Fluid: 800 mL  Total IN: 1122 mL    OUT:    Voided (mL): 200 mL  Total OUT: 200 mL    Total NET: 922 mL      04-07-22 @ 07:01  -  04-08-22 @ 03:56  --------------------------------------------------------  IN:    Heparin: 320 mL    Insulin: 8 mL    IV PiggyBack: 50 mL    Oral Fluid: 460 mL  Total IN: 838 mL    OUT:    Indwelling Catheter - Urethral (mL): 1630 mL    Voided (mL): 300 mL  Total OUT: 1930 mL    Total NET: -1092 mL      MEDICATIONS:  ·	DVT PROPHYLAXIS: heparin  Infusion 1200 Unit(s)/Hr  ·	GI PROPHYLAXIS: pantoprazole    Tablet 40 milliGRAM(s)  ·	ANTIBIOTICS: cefTRIAXone   IVPB 1000 milliGRAM(s)      LAB/STUDIES:                        7.7    10.58 )-----------( 231      ( 07 Apr 2022 23:25 )             26.4     138  |  99  |  71<H>  ----------------------------<  343<H>  4.6   |  21<L>  |  2.25<H>    Ca    9.1      07 Apr 2022 23:25  Phos  5.0     04-07  Mg     2.4     04-07      PT/INR - ( 07 Apr 2022 23:25 )   PT: 14.1 sec;   INR: 1.21 ratio    PTT - ( 07 Apr 2022 23:25 )  PTT:84.4 sec      ABG - ( 07 Apr 2022 23:24 )  pH, Arterial: 7.43  pH, Blood: x     /  pCO2: 36    /  pO2: 79    / HCO3: 24    / Base Excess: -0.1  /  SaO2: 97.2         VASCULAR SURGERY PROGRESS NOTE  POD#4 Guillotine amputation below knee    SUBJECTIVE  Pt seen and examined at bedside. No complaints. Endorses improved breathing.   No acute events overnight.       OBJECTIVE:    PHYSICAL EXAM   General Appearance: NAD  Resp: HFNC 40/40, mildly increased work of breathing, diffuse crackles  CV: RRR  Abdomen: Soft, Nontender, Nondistended  Extremity: RLE BKA with clean dry, intact dressing. No bleeding noted on dressing. Knee immobilizer in place      Vital Signs Last 24 Hrs  T(C): 37.2 (08 Apr 2022 03:00), Max: 37.8 (07 Apr 2022 11:11)  T(F): 99 (08 Apr 2022 03:00), Max: 100 (07 Apr 2022 11:11)  HR: 84 (08 Apr 2022 03:00) (67 - 98)  BP: 152/67 (08 Apr 2022 03:00) (100/44 - 158/65)  BP(mean): 96 (08 Apr 2022 03:00) (89 - 101)  RR: 19 (08 Apr 2022 03:00) (18 - 33)  SpO2: 95% (08 Apr 2022 03:00) (91% - 100%)    I's & O's    04-06-22 @ 07:01  -  04-07-22 @ 07:00  --------------------------------------------------------  IN:    Heparin: 322 mL    Oral Fluid: 800 mL  Total IN: 1122 mL    OUT:    Voided (mL): 200 mL  Total OUT: 200 mL    Total NET: 922 mL      04-07-22 @ 07:01  -  04-08-22 @ 03:56  --------------------------------------------------------  IN:    Heparin: 320 mL    Insulin: 8 mL    IV PiggyBack: 50 mL    Oral Fluid: 460 mL  Total IN: 838 mL    OUT:    Indwelling Catheter - Urethral (mL): 1630 mL    Voided (mL): 300 mL  Total OUT: 1930 mL    Total NET: -1092 mL      MEDICATIONS:  ·	DVT PROPHYLAXIS: heparin  Infusion 1200 Unit(s)/Hr  ·	GI PROPHYLAXIS: pantoprazole    Tablet 40 milliGRAM(s)  ·	ANTIBIOTICS: cefTRIAXone   IVPB 1000 milliGRAM(s)      LAB/STUDIES:                        7.7    10.58 )-----------( 231      ( 07 Apr 2022 23:25 )             26.4     138  |  99  |  71<H>  ----------------------------<  343<H>  4.6   |  21<L>  |  2.25<H>    Ca    9.1      07 Apr 2022 23:25  Phos  5.0     04-07  Mg     2.4     04-07      PT/INR - ( 07 Apr 2022 23:25 )   PT: 14.1 sec;   INR: 1.21 ratio    PTT - ( 07 Apr 2022 23:25 )  PTT:84.4 sec      ABG - ( 07 Apr 2022 23:24 )  pH, Arterial: 7.43  pH, Blood: x     /  pCO2: 36    /  pO2: 79    / HCO3: 24    / Base Excess: -0.1  /  SaO2: 97.2

## 2022-04-09 NOTE — CHART NOTE - NSCHARTNOTEFT_GEN_A_CORE
Endocrine following for diabetes management. Pt. remains on steroids with extremely high insulin gtt rates. Continue with insulin gtt until rates come down.      Jaxon Pereira D.O  [phone]

## 2022-04-09 NOTE — PROGRESS NOTE ADULT - SUBJECTIVE AND OBJECTIVE BOX
Ritesh Bell MD  Interventional Cardiology / Endovascular Specialist  [place] Office : [phone] [place] [address] [place] [address]  Tel:   [place] Office : [address] [facility] [place] [address]  Tel: [phone]    Pt is lying in bed better MS on HFNC euvolemic on exam     MEDICATIONS:  heparin  Infusion 1200 Unit(s)/Hr IV Continuous <Continuous>  metoprolol succinate ER 25 milliGRAM(s) Oral daily  tamsulosin 0.8 milliGRAM(s) Oral at bedtime    cefTRIAXone   IVPB      cefTRIAXone   IVPB 1000 milliGRAM(s) IV Intermittent every 24 hours    albuterol/ipratropium for Nebulization 3 milliLiter(s) Nebulizer <User Schedule>  buDESOnide    Inhalation Suspension 0.5 milliGRAM(s) Inhalation every 12 hours  guaiFENesin ER 1200 milliGRAM(s) Oral every 12 hours  montelukast 10 milliGRAM(s) Oral daily    acetaminophen     Tablet .. 975 milliGRAM(s) Oral every 6 hours  gabapentin 300 milliGRAM(s) Oral every 8 hours    pantoprazole    Tablet 40 milliGRAM(s) Oral before breakfast  polyethylene glycol 3350 17 Gram(s) Oral daily  senna 2 Tablet(s) Oral at bedtime    atorvastatin 40 milliGRAM(s) Oral at bedtime  finasteride 5 milliGRAM(s) Oral daily  insulin regular Infusion 3 Unit(s)/Hr IV Continuous <Continuous>  levothyroxine 25 MICROGram(s) Oral daily  methylPREDNISolone sodium succinate Injectable 20 milliGRAM(s) IV Push every 6 hours    multivitamin/minerals 1 Tablet(s) Oral daily      PAST MEDICAL/SURGICAL HISTORY  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    Deep Vein Thrombosis (DVT)  17 yeras ago on Coumadin    CHF (congestive heart failure)  last exacerbation in 1/2017    Enlarged prostate    GERD (gastroesophageal reflux disease)    Hernia  umblical    Calculus of bile duct without cholangitis or cholecystitis without obstruction    Atrial fibrillation    S/P Hernia Repair    S/P cataract surgery, unspecified laterality    S/P ERCP  3/2017        SOCIAL HISTORY: Substance Use (street drugs): ( x ) never used  (  ) other:    FAMILY HISTORY:      REVIEW OF SYSTEMS:  CONSTITUTIONAL: No fever, weight loss, or fatigue  EYES: No eye pain, visual disturbances, or discharge  ENMT:  No difficulty hearing, tinnitus, vertigo; No sinus or throat pain  BREASTS: No pain, masses, or nipple discharge  GASTROINTESTINAL: No abdominal or epigastric pain. No nausea, vomiting, or hematemesis; No diarrhea or constipation. No melena or hematochezia.  GENITOURINARY: No dysuria, frequency, hematuria, or incontinence  NEUROLOGICAL: No headaches, memory loss, loss of strength, numbness, or tremors  ENDOCRINE: No heat or cold intolerance; No hair loss  MUSCULOSKELETAL: No joint pain or swelling; No muscle, back, or extremity pain  PSYCHIATRIC: No depression, anxiety, mood swings, or difficulty sleeping  HEME/LYMPH: No easy bruising, or bleeding gums  All others negative    PHYSICAL EXAM:  T(C): 36.4 (04-09-22 @ 15:00), Max: 37.3 (04-08-22 @ 19:00)  HR: 63 (04-09-22 @ 15:00) (60 - 99)  BP: 143/65 (04-09-22 @ 15:00) (92/61 - 158/70)  RR: 16 (04-09-22 @ 15:00) (14 - 50)  SpO2: 94% (04-09-22 @ 15:00) (89% - 99%)  Wt(kg): --  I&O's Summary    08 Apr 2022 07:01  -  09 Apr 2022 07:00  --------------------------------------------------------  IN: 1682.5 mL / OUT: 1445 mL / NET: 237.5 mL    09 Apr 2022 07:01  -  09 Apr 2022 16:00  --------------------------------------------------------  IN: 275 mL / OUT: 495 mL / NET: -220 mL      GENERAL: NAD  EYES:   PERRLA   ENMT:   Moist mucous membranes, Good dentition, No lesions  Cardiovascular: Normal S1 S2, No JVD, No murmurs, No edema  Respiratory: scattered wheezing   Gastrointestinal:  Soft, Non-tender, + BS	  Extremities: right bka                           7.1    13.12 )-----------( 252      ( 09 Apr 2022 04:03 )             24.0     04-09    139  |  101  |  98<H>  ----------------------------<  241<H>  3.9   |  21<L>  |  2.24<H>    Ca    9.1      09 Apr 2022 04:03  Phos  4.4     04-09  Mg     2.5     04-09      proBNP:   Lipid Profile:   HgA1c:   TSH:     Consultant(s) Notes Reviewed:  [x ] YES  [ ] NO    Care Discussed with Consultants/Other Providers [ x] YES  [ ] NO    Imaging Personally Reviewed independently:  [x] YES  [ ] NO    All labs, radiologic studies, vitals, orders and medications list reviewed. Patient is seen and examined at bedside. Case discussed with medical team.

## 2022-04-09 NOTE — PROGRESS NOTE ADULT - ASSESSMENT
86y M with Hx DM, AF, HTN, COPD, and HLD who presented with RLE pain (s/p angio 9/2021), found to have wet gangrene s/p R laura JAMES 4/4. Post-op course c/b severe COPD exacerbation requiring HFNC. SICU consulted for respiratory monitoring.     PLAN:   Neuro: post-op pain  - Pain control: Tylenol ATC, gabapentin    Respiratory: COPD exacerbation   - HFNC 40L/50%   - Monitor respiratory status  - Duonebs, mucinex, pulmicort, montelukast, methylprenidsolone 20mg IV q6h for COPD exacerbation    Cardiovascular: h/o HTN, HLD, AF  - Monitor vitals signs  - C/w home metoprolol for BP control -- metolazone, losartan HELD iso worsening KARLOS  - C/w home atorvastatin for HLD    Gastrointestinal: no active issues   - Diet: Regular    Genitourinary/Renal: h/o urinary retention  - IVL'd  - C/w home flomax, finasteride  - Monitor UOP, [place] for strict I/O monitoring  - Trend electrolytes on BMP qD, replete PRN    Heme: no active issues   - Hep gtt for AF, monitor PTT qD  - ASA 81 HELD  - Trend H/H on CBC qD    ID: RLE wet gangrene s/p laura R ERIKA 4/4  - Abx: ceftriaxone  - Trend WBC on CBC qD  - Monitor fever curve    Endocrine: h/o DM, hypothyroidism  - Insulin gtt, wean as tolerated  - C/w home Synthroid 25mcg PO qD    Lines:   - PIV x 2  - [place]air    Code Status: Full Code  Dispo: RICHARD Santiago, PGY-2  Surgical ICU  #[id number]

## 2022-04-09 NOTE — PROGRESS NOTE ADULT - ASSESSMENT
86y male with PMHx of HTN, HLD, DM2 and nonhealing wounds of RLE who is non-ambulatory at home now s/p right guillotine BELOW knee amputation. Postoperative course c/b severe COPD exacerbation requiring excalation of O2 supplementation with HFNC. SICU consulted for close respiratory monitoring in the setting of COPD exacerbation. Improved saturation and work of breathing, on HFNC.     PLAN:  - heparin gtt  - ceftriaxone  - insulin infusion  - AKA planning next week  - pain control  - PT eval post AKA  - f/u pulm recs  - would suggest wound care for vac placement, premedicate before changes for pain  - appreciate SICU care    VASCULAR  [id number]    86y male with PMHx of HTN, HLD, DM2 and nonhealing wounds of RLE who is non-ambulatory at home now s/p right guillotine BELOW knee amputation. Postoperative course c/b severe COPD exacerbation requiring excalation of O2 supplementation with HFNC. SICU consulted for close respiratory monitoring in the setting of COPD exacerbation. Improved saturation and work of breathing, on HFNC.     Plan:  - Continue hep gtt, goal aPTT 59-99  - Continue IV abx: Rocephin  - Pain control  - Regular diet  - Glucose control  - Planning for completion AKA next week  - Appreciate pulmonary recs  - Appreciate excellent SICU care      Vascular Surgery  [id number] 86y male with PMHx of HTN, HLD, DM2 and nonhealing wounds of RLE who is non-ambulatory at home now s/p right guillotine BELOW knee amputation. Postoperative course c/b severe COPD exacerbation requiring excalation of O2 supplementation with HFNC. SICU consulted for close respiratory monitoring in the setting of COPD exacerbation. Improved saturation and work of breathing, on HFNC.     Plan:  - Maintain wound vac, next vac change Monday  - Continue hep gtt, goal aPTT 59-99  - Continue IV abx: Rocephin  - Pain control  - Regular diet  - Glucose control  - Planning for completion AKA next week  - Appreciate pulmonary recs  - Appreciate excellent SICU care      Vascular Surgery  [id number]

## 2022-04-09 NOTE — PROGRESS NOTE ADULT - SUBJECTIVE AND OBJECTIVE BOX
[facility] : [facility] [phone], Office [phone]  Dr Melendez/Dr Wall  /Dr Alex butt /Dr SHAHANA Knight/Dr Joshua Jang/Dr Jeremi Lee /Dr KENDALL Mora  _______________________________________________________________________________________________    seen and examined today for renal failure  Interval : last 3 days Serum Creatinine similar    VITALS:  T(F): 98.1 (04-09-22 @ 11:00), Max: 99.1 (04-08-22 @ 19:00)  HR: 73 (04-09-22 @ 11:02)  BP: 129/71 (04-09-22 @ 11:00)  RR: 22 (04-09-22 @ 11:00)  SpO2: 94% (04-09-22 @ 11:02)  Wt(kg): --    04-08 @ 07:01  -  04-09 @ 07:00  --------------------------------------------------------  IN: 1682.5 mL / OUT: 1445 mL / NET: 237.5 mL    04-09 @ 07:01  -  04-09 @ 12:26  --------------------------------------------------------  IN: 165 mL / OUT: 235 mL / NET: -70 mL    Physical Exam :-  Constitutional: NAD  Respiratory: Bilateral equal breath sounds, few Crackles present.  Cardiovascular: S1, S2 normal, positive Murmur  Gastrointestinal: Bowel Sounds present, soft, non tender.  Extremities: no Edema Feet, right leg amputation status   Neurological: Alert and Oriented x 3  Psychiatric: Normal mood, normal affect  Data:-  Allergies :   No Known Allergies    Hospital Medications:   MEDICATIONS  (STANDING):  acetaminophen     Tablet .. 975 milliGRAM(s) Oral every 6 hours  albuterol/ipratropium for Nebulization 3 milliLiter(s) Nebulizer <User Schedule>  atorvastatin 40 milliGRAM(s) Oral at bedtime  buDESOnide    Inhalation Suspension 0.5 milliGRAM(s) Inhalation every 12 hours  cefTRIAXone   IVPB      cefTRIAXone   IVPB 1000 milliGRAM(s) IV Intermittent every 24 hours  finasteride 5 milliGRAM(s) Oral daily  gabapentin 300 milliGRAM(s) Oral every 8 hours  guaiFENesin ER 1200 milliGRAM(s) Oral every 12 hours  heparin  Infusion 1200 Unit(s)/Hr (16 mL/Hr) IV Continuous <Continuous>  insulin regular Infusion 3 Unit(s)/Hr (3 mL/Hr) IV Continuous <Continuous>  levothyroxine 25 MICROGram(s) Oral daily  methylPREDNISolone sodium succinate Injectable 20 milliGRAM(s) IV Push every 6 hours  metoprolol succinate ER 25 milliGRAM(s) Oral daily  montelukast 10 milliGRAM(s) Oral daily  multivitamin/minerals 1 Tablet(s) Oral daily  pantoprazole    Tablet 40 milliGRAM(s) Oral before breakfast  polyethylene glycol 3350 17 Gram(s) Oral daily  senna 2 Tablet(s) Oral at bedtime  tamsulosin 0.8 milliGRAM(s) Oral at bedtime    04-09    139  |  101  |  98<H>  ----------------------------<  241<H>  3.9   |  21<L>  |  2.24<H>    Ca    9.1      09 Apr 2022 04:03  Phos  4.4     04-09  Mg     2.5     04-09      Creatinine Trend: 2.24 <--, 2.44 <--, 2.28 <--, 2.25 <--, 1.89 <--, 1.68 <--, 1.56 <--, 1.52 <--, 1.48 <--                        7.1    13.12 )-----------( 252      ( 09 Apr 2022 04:03 )             24.0

## 2022-04-09 NOTE — PROGRESS NOTE ADULT - SUBJECTIVE AND OBJECTIVE BOX
Follow-up Pulm Progress Note    The patient was seen and examined. Notes reviewed and discussed with staff/team as applicable      Pt is arousable. On HFNC    ROS limited but denies chest pain or dyspnea    Vital Signs Last 24 Hrs  T(C): 36.7 (09 Apr 2022 11:00), Max: 37.3 (08 Apr 2022 19:00)  T(F): 98.1 (09 Apr 2022 11:00), Max: 99.1 (08 Apr 2022 19:00)  HR: 77 (09 Apr 2022 13:00) (60 - 99)  BP: 129/76 (09 Apr 2022 13:00) (92/61 - 158/70)  BP(mean): 98 (09 Apr 2022 13:00) (70 - 101)  RR: 24 (09 Apr 2022 13:00) (13 - 50)  SpO2: 92% (09 Apr 2022 13:00) (87% - 99%)    ABG - ( 09 Apr 2022 03:35 )  pH, Arterial: 7.43  pH, Blood: x     /  pCO2: 37    /  pO2: 103   / HCO3: 25    / Base Excess: 0.3   /  SaO2: 99.6                  04-08 @ 07:01  -  04-09 @ 07:00  --------------------------------------------------------  IN: 1682.5 mL / OUT: 1445 mL / NET: 237.5 mL          Medications:  MEDICATIONS  (STANDING):  acetaminophen     Tablet .. 975 milliGRAM(s) Oral every 6 hours  albuterol/ipratropium for Nebulization 3 milliLiter(s) Nebulizer <User Schedule>  atorvastatin 40 milliGRAM(s) Oral at bedtime  buDESOnide    Inhalation Suspension 0.5 milliGRAM(s) Inhalation every 12 hours  cefTRIAXone   IVPB      cefTRIAXone   IVPB 1000 milliGRAM(s) IV Intermittent every 24 hours  finasteride 5 milliGRAM(s) Oral daily  gabapentin 300 milliGRAM(s) Oral every 8 hours  guaiFENesin ER 1200 milliGRAM(s) Oral every 12 hours  heparin  Infusion 1200 Unit(s)/Hr (16 mL/Hr) IV Continuous <Continuous>  insulin regular Infusion 3 Unit(s)/Hr (3 mL/Hr) IV Continuous <Continuous>  levothyroxine 25 MICROGram(s) Oral daily  methylPREDNISolone sodium succinate Injectable 20 milliGRAM(s) IV Push every 6 hours  metoprolol succinate ER 25 milliGRAM(s) Oral daily  montelukast 10 milliGRAM(s) Oral daily  multivitamin/minerals 1 Tablet(s) Oral daily  pantoprazole    Tablet 40 milliGRAM(s) Oral before breakfast  polyethylene glycol 3350 17 Gram(s) Oral daily  senna 2 Tablet(s) Oral at bedtime  tamsulosin 0.8 milliGRAM(s) Oral at bedtime    MEDICATIONS  (PRN):      Allergies    No Known Allergies    Intolerances      Physical Examination:    Elderly white man, in SICU on HFNC  Neck: no JVD, LAD, accessory muscle use  PULM: Clear to auscultation bilaterally anteriorly  CVS: Regular rate and rhythm, S1S2, no murmurs, rubs, or gallops  Abdomen: soft,   Extremities: s/p amputation R BKA  Neuro: arousable, moves extrem      LABS:                        7.1    13.12 )-----------( 252      ( 09 Apr 2022 04:03 )             24.0     04-09    139  |  101  |  98<H>  ----------------------------<  241<H>  3.9   |  21<L>  |  2.24<H>    Ca    9.1      09 Apr 2022 04:03  Phos  4.4     04-09  Mg     2.5     04-09            CAPILLARY BLOOD GLUCOSE      POCT Blood Glucose.: 238 mg/dL (09 Apr 2022 13:00)    PT/INR - ( 09 Apr 2022 10:33 )   PT: 13.9 sec;   INR: 1.20 ratio         PTT - ( 09 Apr 2022 10:33 )  PTT:82.1 sec      RADIOLOGY REVIEWED    CXR:    < from: Xray Chest 1 View AP/PA (04.09.22 @ 06:52) >    ACC: [id number] EXAM:  XR CHEST AP OR PA 1V                          PROCEDURE DATE:  04/09/2022          INTERPRETATION:  CHEST X-RAY: AP PORTABLE    INDICATION: eval atelectasis.    COMPARISON: Chest x-ray 04/07/2022.    FINDINGS:  Left chest wall cardiac pacemaker with its leads only partially imaged.    Heart size cannot be accurately assessed in this projection.    The left lung base and costophrenic angle are excluded from the image.   There is otherwise no focal consolidation.    IMPRESSION:  Limited evaluation of the left lung base. Within this limitation, there   is no focal consolidation.    --- End of Report ---          ANITA SMITH MD; Resident Radiologist  This document has been electronically signed.  ESCOBAR MILLER M.D., ATTENDING INTERVENTIONAL RADIOLOGIST  This document has been electronically signed. Apr 9 2022 12:36PM

## 2022-04-09 NOTE — PROGRESS NOTE ADULT - SUBJECTIVE AND OBJECTIVE BOX
HPI:  86y Male    24 HOUR EVENTS:    SUBJECTIVE/ROS:  Patient seen at bedside this AM.     [ ] Due to altered mental status/intubation, subjective information were not able to be obtained from the patient. History was obtained, to the extent possible, from review of the chart and collateral sources of information.    OBJECTIVE:    VITALS:  Vital Signs Last 24 Hrs  T(C): 37.2 (08 Apr 2022 23:00), Max: 37.3 (08 Apr 2022 08:00)  T(F): 99 (08 Apr 2022 23:00), Max: 99.1 (08 Apr 2022 08:00)  HR: 87 (09 Apr 2022 02:00) (68 - 90)  BP: 158/70 (09 Apr 2022 02:00) (92/61 - 158/70)  BP(mean): 101 (09 Apr 2022 02:00) (70 - 101)  RR: 25 (09 Apr 2022 02:00) (13 - 32)  SpO2: 94% (09 Apr 2022 02:00) (87% - 99%)    I&O's Summary    07 Apr 2022 07:01  -  08 Apr 2022 07:00  --------------------------------------------------------  IN: 1056 mL / OUT: 2430 mL / NET: -1374 mL    08 Apr 2022 07:01  -  09 Apr 2022 03:00  --------------------------------------------------------  IN: 1442.5 mL / OUT: 1135 mL / NET: 307.5 mL        PHYSICAL EXAM:    NEURO  RASS:     GCS:     CAM ICU:  Exam: awake, alert, oriented    RESPIRATORY  Exam: no increased WOB on RA  Mechanical Ventilation:     CARDIOVASCULAR  Exam: warm, well-perfused  Cardiac Rhythm: normal sinus rhythm noted on cardiac monitor    GI/NUTRITION  Exam: soft, non-distended, non-tender    GENITOURINARY  Exam:     ACCESS DEVICES:  [ ] Peripheral IV  [ ] Central Venous Line	[ ] R	[ ] L	[ ] IJ	[ ] Fem	[ ] SC	Placed:   [ ] Arterial Line		[ ] R	[ ] L	[ ] Fem	[ ] Rad	[ ] Ax	Placed:   [ ] PICC:					[ ] Mediport  [ ] Urinary Catheter, Date Placed:   [x] Necessity of urinary, arterial, and venous catheters discussed      LABS:                        7.4    17.02 )-----------( 240      ( 08 Apr 2022 07:55 )             25.3   04-08    140  |  102  |  88<H>  ----------------------------<  194<H>  4.4   |  24  |  2.44<H>    Ca    9.2      08 Apr 2022 16:26  Phos  4.2     04-08  Mg     2.4     04-08      CAPILLARY BLOOD GLUCOSE      POCT Blood Glucose.: 299 mg/dL (09 Apr 2022 02:00)  POCT Blood Glucose.: 327 mg/dL (09 Apr 2022 01:06)  POCT Blood Glucose.: 309 mg/dL (09 Apr 2022 00:04)  POCT Blood Glucose.: 319 mg/dL (08 Apr 2022 22:58)  POCT Blood Glucose.: 371 mg/dL (08 Apr 2022 22:00)  POCT Blood Glucose.: 358 mg/dL (08 Apr 2022 21:02)  POCT Blood Glucose.: 349 mg/dL (08 Apr 2022 20:01)  POCT Blood Glucose.: 299 mg/dL (08 Apr 2022 18:56)  POCT Blood Glucose.: 247 mg/dL (08 Apr 2022 18:01)  POCT Blood Glucose.: 208 mg/dL (08 Apr 2022 17:04)  POCT Blood Glucose.: 204 mg/dL (08 Apr 2022 15:58)  POCT Blood Glucose.: 181 mg/dL (08 Apr 2022 15:24)  POCT Blood Glucose.: 155 mg/dL (08 Apr 2022 14:10)  POCT Blood Glucose.: 93 mg/dL (08 Apr 2022 12:57)  POCT Blood Glucose.: 87 mg/dL (08 Apr 2022 11:58)  POCT Blood Glucose.: 121 mg/dL (08 Apr 2022 11:08)  POCT Blood Glucose.: 153 mg/dL (08 Apr 2022 10:10)  POCT Blood Glucose.: 165 mg/dL (08 Apr 2022 09:10)  POCT Blood Glucose.: 169 mg/dL (08 Apr 2022 08:08)  POCT Blood Glucose.: 205 mg/dL (08 Apr 2022 06:57)  POCT Blood Glucose.: 252 mg/dL (08 Apr 2022 05:58)  POCT Blood Glucose.: 270 mg/dL (08 Apr 2022 05:02)  POCT Blood Glucose.: 301 mg/dL (08 Apr 2022 04:01)    MEDICATIONS:   MEDICATIONS  (STANDING):  acetaminophen     Tablet .. 975 milliGRAM(s) Oral every 6 hours  albuterol/ipratropium for Nebulization 3 milliLiter(s) Nebulizer <User Schedule>  atorvastatin 40 milliGRAM(s) Oral at bedtime  buDESOnide    Inhalation Suspension 0.5 milliGRAM(s) Inhalation every 12 hours  cefTRIAXone   IVPB      cefTRIAXone   IVPB 1000 milliGRAM(s) IV Intermittent every 24 hours  finasteride 5 milliGRAM(s) Oral daily  gabapentin 300 milliGRAM(s) Oral every 8 hours  guaiFENesin ER 1200 milliGRAM(s) Oral every 12 hours  heparin  Infusion 1200 Unit(s)/Hr (16 mL/Hr) IV Continuous <Continuous>  insulin regular Infusion 3 Unit(s)/Hr (3 mL/Hr) IV Continuous <Continuous>  levothyroxine 25 MICROGram(s) Oral daily  methylPREDNISolone sodium succinate Injectable 20 milliGRAM(s) IV Push every 6 hours  metoprolol succinate ER 25 milliGRAM(s) Oral daily  montelukast 10 milliGRAM(s) Oral daily  multivitamin/minerals 1 Tablet(s) Oral daily  pantoprazole    Tablet 40 milliGRAM(s) Oral before breakfast  tamsulosin 0.8 milliGRAM(s) Oral at bedtime    MEDICATIONS  (PRN):      IMAGING: HPI:  86y M with Hx DM, HTN, COPD, and HLD who presented with RLE pain (s/p angio 9/2021), found to have wet gangrene s/p R laura JAMES 4/4. Post-op course c/b severe COPD exacerbation requiring HFNC. SICU consulted for respiratory monitoring.     24 HOUR EVENTS:  - Metolazone and losartan d/c'd due to nephrotoxicity iso worsening KARLOS     SUBJECTIVE/ROS:  Patient seen at bedside this AM.     [x] Due to altered mental status/intubation, subjective information were not able to be obtained from the patient. History was obtained, to the extent possible, from review of the chart and collateral sources of information.    OBJECTIVE:    VITALS:  Vital Signs Last 24 Hrs  T(C): 37.2 (08 Apr 2022 23:00), Max: 37.3 (08 Apr 2022 08:00)  T(F): 99 (08 Apr 2022 23:00), Max: 99.1 (08 Apr 2022 08:00)  HR: 87 (09 Apr 2022 02:00) (68 - 90)  BP: 158/70 (09 Apr 2022 02:00) (92/61 - 158/70)  BP(mean): 101 (09 Apr 2022 02:00) (70 - 101)  RR: 25 (09 Apr 2022 02:00) (13 - 32)  SpO2: 94% (09 Apr 2022 02:00) (87% - 99%)    I&O's Summary    07 Apr 2022 07:01  -  08 Apr 2022 07:00  --------------------------------------------------------  IN: 1056 mL / OUT: 2430 mL / NET: -1374 mL    08 Apr 2022 07:01  -  09 Apr 2022 03:00  --------------------------------------------------------  IN: 1442.5 mL / OUT: 1135 mL / NET: 307.5 mL      PHYSICAL EXAM:    NEURO  Exam: awake, alert, oriented    RESPIRATORY  Exam: no increased WOB on HFNC 40L/50%    CARDIOVASCULAR  Exam: warm, well-perfused; RLE BKA dressing with sanguineous strikethrough   Cardiac Rhythm: AF noted on cardiac monitor    GI/NUTRITION  Exam: soft, non-distended, non-tender    GENITOURINARY  Exam: Adair catheter in place, draining clear yellow urine     ACCESS DEVICES:  [2] Peripheral IV  [ ] Central Venous Line	[ ] R	[ ] L	[ ] IJ	[ ] Fem	[ ] SC	Placed:   [ ] Arterial Line		[ ] R	[ ] L	[ ] Fem	[ ] Rad	[ ] Ax	Placed:   [ ] PICC:					[ ] Mediport  [x] Urinary Catheter, Date Placed: 4/7  [x] Necessity of urinary, arterial, and venous catheters discussed      LABS:             7.4    17.02 )-----------( 240      ( 08 Apr 2022 07:55 )             25.3     140  |  102  |  88<H>  ----------------------------<  194<H>  4.4   |  24  |  2.44<H>    Ca    9.2      08 Apr 2022 16:26  Phos  4.2     04-08  Mg     2.4     04-08    POCT Blood Glucose.: 299 mg/dL (09 Apr 2022 02:00)  POCT Blood Glucose.: 327 mg/dL (09 Apr 2022 01:06)  POCT Blood Glucose.: 309 mg/dL (09 Apr 2022 00:04)  POCT Blood Glucose.: 319 mg/dL (08 Apr 2022 22:58)  POCT Blood Glucose.: 371 mg/dL (08 Apr 2022 22:00)  POCT Blood Glucose.: 358 mg/dL (08 Apr 2022 21:02)  POCT Blood Glucose.: 349 mg/dL (08 Apr 2022 20:01)  POCT Blood Glucose.: 299 mg/dL (08 Apr 2022 18:56)  POCT Blood Glucose.: 247 mg/dL (08 Apr 2022 18:01)  POCT Blood Glucose.: 208 mg/dL (08 Apr 2022 17:04)  POCT Blood Glucose.: 204 mg/dL (08 Apr 2022 15:58)  POCT Blood Glucose.: 181 mg/dL (08 Apr 2022 15:24)  POCT Blood Glucose.: 155 mg/dL (08 Apr 2022 14:10)  POCT Blood Glucose.: 93 mg/dL (08 Apr 2022 12:57)  POCT Blood Glucose.: 87 mg/dL (08 Apr 2022 11:58)  POCT Blood Glucose.: 121 mg/dL (08 Apr 2022 11:08)  POCT Blood Glucose.: 153 mg/dL (08 Apr 2022 10:10)  POCT Blood Glucose.: 165 mg/dL (08 Apr 2022 09:10)  POCT Blood Glucose.: 169 mg/dL (08 Apr 2022 08:08)  POCT Blood Glucose.: 205 mg/dL (08 Apr 2022 06:57)  POCT Blood Glucose.: 252 mg/dL (08 Apr 2022 05:58)  POCT Blood Glucose.: 270 mg/dL (08 Apr 2022 05:02)  POCT Blood Glucose.: 301 mg/dL (08 Apr 2022 04:01)    MEDICATIONS:   MEDICATIONS  (STANDING):  acetaminophen     Tablet .. 975 milliGRAM(s) Oral every 6 hours  albuterol/ipratropium for Nebulization 3 milliLiter(s) Nebulizer <User Schedule>  atorvastatin 40 milliGRAM(s) Oral at bedtime  buDESOnide    Inhalation Suspension 0.5 milliGRAM(s) Inhalation every 12 hours  cefTRIAXone   IVPB      cefTRIAXone   IVPB 1000 milliGRAM(s) IV Intermittent every 24 hours  finasteride 5 milliGRAM(s) Oral daily  gabapentin 300 milliGRAM(s) Oral every 8 hours  guaiFENesin ER 1200 milliGRAM(s) Oral every 12 hours  heparin  Infusion 1200 Unit(s)/Hr (16 mL/Hr) IV Continuous <Continuous>  insulin regular Infusion 3 Unit(s)/Hr (3 mL/Hr) IV Continuous <Continuous>  levothyroxine 25 MICROGram(s) Oral daily  methylPREDNISolone sodium succinate Injectable 20 milliGRAM(s) IV Push every 6 hours  metoprolol succinate ER 25 milliGRAM(s) Oral daily  montelukast 10 milliGRAM(s) Oral daily  multivitamin/minerals 1 Tablet(s) Oral daily  pantoprazole    Tablet 40 milliGRAM(s) Oral before breakfast  tamsulosin 0.8 milliGRAM(s) Oral at bedtime

## 2022-04-09 NOTE — PROGRESS NOTE ADULT - ASSESSMENT
86M with PMH of COPD (not on home o2), prior smoking history (40 pack years), HTN, HLD, Afib, CHF, T2DM, CVA, BPH, and PAD admitted for elective RLE AKA. Renal consulted for CKD Mx.    KARLOS on CKD 3,   baseline Cr ~1.7  serum k stable  rise likely 2/2 hemodynamic changes--> ATN  s/p hudson --> 40 to 50cc urineoutput  today Serum Creatinine improving    Plan  cont monitor Serum Creatinine   off losartan   monitor BMP daily and u/o   dose all meds for eGFR  avoid NSAIDs/Nephrotoxics.    HTN, controlled. bp optimal   continue ARB BB. monitor bp    Rt LE nonhealing wound:  s/p amputation  follow up with vascular

## 2022-04-09 NOTE — PROGRESS NOTE ADULT - ASSESSMENT
ASSESSMENT:    86 year old gentleman, former smoker, followed by Dr. Alicja Rob of our practice for asthma/COPD overlap  syndrome and obstructive sleep apnea being treated conservatively. He has no history of TOM colonization/infection as listed in the "past medical history". He has been stable from a pulmonary perspective and maintained on budesonide and duoneb once daily and if needed. He also has a history of HTN, HLD, DM, CKD, CVA, CHF (mild systolic dysfunction) and atrial fibrillation with sick sinus syndrome s/p pacemaker implantation. He has been followed for many months for chronic foot wounds and leg pain now with some purulence and gangrene. The pain is exacerbated when laying in bed and improves with tylenol and when hanging the legs off of the bed. He is no longer able to ambulate. 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 The patient has developed marked shortness of breath with severe hypoxemia currently requiring a nasal canula @ 5lpm to maintain saturation @ 90%. He has a cough with copious mucous in his chest which he is unable to expectorate. He has chest congestion and wheeze. No fevers, chills or sweats. No chest pain/pressure or palpitations. Called by the patient's family and asked to be involved with his pulmonary care.    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 limiting diaphragmatic excursion and respiratory muscle weakness decreasing chest wall expansion -> atelectasis  3) no evidence of pulmonary edema, pleural effusions or pneumonia on the most recent CXR    leukocytosis more likely related to systemic steroids than infection    CKD/KARLOS due to diuresis in the setting of euvolemia    PLAN/RECOMMENDATIONS:    oxygen supplementation to keep saturation greater than 92% to avoid limb ischemia - continue HFNC for sat >92%  ceftriaxone x 5 days  solumedrol 20mg IVP q6h - glucose control on steroids, hopefully taper tomorrow  albuterol/atrovent nebs q6h  pulmicort 0.5mg nebs q12h - give after duoneb - rinse mouth after use  mucinex 1200mg 2 times daily  singulair 10mg @ bedtime  acapella device/incentive spirometer  vascular surgery follow-up noted    respiratory status needs to be further optimized prior to surgery    heparin gtt  pain control  GI prophylaxis - protonix    Javier Bazzi MD  Pulmonary Medicine  [phone]

## 2022-04-09 NOTE — PROGRESS NOTE ADULT - ASSESSMENT
EKG -  A sense V paced PVC's  Echo - Mild LV dysfunction mild aortic stenosis    a/p     1) Preop clearance for AKA - pt has h/o moderate LV dysfunction as per echo 2017, no chest pains 2d echo now shows mild LV dysfunction  , 12 lead EKG ok,  PPM interrogated , s/p BKA , pt wheezing b/l f/u pulm recs now on steroids, On HFNC consider CT ctest non con     2) HTN - continue losartan and metoprolol     3) Chronic systolic CHF - euvolemic on exam hold metolazone     4) ?Atrial fib - coumadin on hold on IV heparin     5) KARLOS : would hold losartan and metolazone renal function improving

## 2022-04-09 NOTE — PROGRESS NOTE ADULT - SUBJECTIVE AND OBJECTIVE BOX
Vascular Surgery Progress Note    INTERVAL EVENTS:   No acute events overnight    SUBJECTIVE: Patient seen and examined at bedside with surgical team    OBJECTIVE:    Vital Signs Last 24 Hrs  T(C): 36.2 (09 Apr 2022 03:00), Max: 37.3 (08 Apr 2022 08:00)  T(F): 97.2 (09 Apr 2022 03:00), Max: 99.1 (08 Apr 2022 08:00)  HR: 85 (09 Apr 2022 04:32) (68 - 90)  BP: 158/70 (09 Apr 2022 02:00) (92/61 - 158/70)  BP(mean): 101 (09 Apr 2022 02:00) (70 - 101)  RR: 26 (09 Apr 2022 04:32) (13 - 32)  SpO2: 96% (09 Apr 2022 04:32) (87% - 99%)I&O's Detail    07 Apr 2022 07:01  -  08 Apr 2022 07:00  --------------------------------------------------------  IN:    Heparin: 384 mL    Insulin: 42 mL    IV PiggyBack: 50 mL    Oral Fluid: 580 mL  Total IN: 1056 mL    OUT:    Indwelling Catheter - Urethral (mL): 2130 mL    Voided (mL): 300 mL  Total OUT: 2430 mL    Total NET: -1374 mL      08 Apr 2022 07:01  -  09 Apr 2022 04:44  --------------------------------------------------------  IN:    Heparin: 346 mL    Insulin: 110.5 mL    IV PiggyBack: 50 mL    Oral Fluid: 960 mL  Total IN: 1466.5 mL    OUT:    Indwelling Catheter - Urethral (mL): 1195 mL  Total OUT: 1195 mL    Total NET: 271.5 mL      MEDICATIONS  (STANDING):  acetaminophen     Tablet .. 975 milliGRAM(s) Oral every 6 hours  albuterol/ipratropium for Nebulization 3 milliLiter(s) Nebulizer <User Schedule>  atorvastatin 40 milliGRAM(s) Oral at bedtime  buDESOnide    Inhalation Suspension 0.5 milliGRAM(s) Inhalation every 12 hours  cefTRIAXone   IVPB 1000 milliGRAM(s) IV Intermittent every 24 hours  cefTRIAXone   IVPB      finasteride 5 milliGRAM(s) Oral daily  gabapentin 300 milliGRAM(s) Oral every 8 hours  guaiFENesin ER 1200 milliGRAM(s) Oral every 12 hours  heparin  Infusion 1200 Unit(s)/Hr (16 mL/Hr) IV Continuous <Continuous>  insulin regular Infusion 3 Unit(s)/Hr (3 mL/Hr) IV Continuous <Continuous>  levothyroxine 25 MICROGram(s) Oral daily  methylPREDNISolone sodium succinate Injectable 20 milliGRAM(s) IV Push every 6 hours  metoprolol succinate ER 25 milliGRAM(s) Oral daily  montelukast 10 milliGRAM(s) Oral daily  multivitamin/minerals 1 Tablet(s) Oral daily  pantoprazole    Tablet 40 milliGRAM(s) Oral before breakfast  tamsulosin 0.8 milliGRAM(s) Oral at bedtime    MEDICATIONS  (PRN):      PHYSICAL EXAM:  Constitutional:  Respiratory:   Abdomen:   Extremities:     LABS:                        7.4    17.02 )-----------( 240      ( 08 Apr 2022 07:55 )             25.3     04-08    140  |  102  |  88<H>  ----------------------------<  194<H>  4.4   |  24  |  2.44<H>    Ca    9.2      08 Apr 2022 16:26  Phos  4.2     04-08  Mg     2.4     04-08      PT/INR - ( 08 Apr 2022 16:26 )   PT: 13.4 sec;   INR: 1.16 ratio         PTT - ( 08 Apr 2022 16:26 )  PTT:28.0 sec      ABO Interpretation: B (04-08-22 @ 07:08)      IMAGING:     Vascular Surgery Progress Note    INTERVAL EVENTS:   - Metolazone and losartan d/c'd due to nephrotoxicity iso worsening KARLOS     SUBJECTIVE: Patient seen and examined at bedside with surgical team    OBJECTIVE:    Vital Signs Last 24 Hrs  T(C): 36.2 (09 Apr 2022 03:00), Max: 37.3 (08 Apr 2022 08:00)  T(F): 97.2 (09 Apr 2022 03:00), Max: 99.1 (08 Apr 2022 08:00)  HR: 85 (09 Apr 2022 04:32) (68 - 90)  BP: 158/70 (09 Apr 2022 02:00) (92/61 - 158/70)  BP(mean): 101 (09 Apr 2022 02:00) (70 - 101)  RR: 26 (09 Apr 2022 04:32) (13 - 32)  SpO2: 96% (09 Apr 2022 04:32) (87% - 99%)I&O's Detail    07 Apr 2022 07:01  -  08 Apr 2022 07:00  --------------------------------------------------------  IN:    Heparin: 384 mL    Insulin: 42 mL    IV PiggyBack: 50 mL    Oral Fluid: 580 mL  Total IN: 1056 mL    OUT:    Indwelling Catheter - Urethral (mL): 2130 mL    Voided (mL): 300 mL  Total OUT: 2430 mL    Total NET: -1374 mL      08 Apr 2022 07:01  -  09 Apr 2022 04:44  --------------------------------------------------------  IN:    Heparin: 346 mL    Insulin: 110.5 mL    IV PiggyBack: 50 mL    Oral Fluid: 960 mL  Total IN: 1466.5 mL    OUT:    Indwelling Catheter - Urethral (mL): 1195 mL  Total OUT: 1195 mL    Total NET: 271.5 mL      MEDICATIONS  (STANDING):  acetaminophen     Tablet .. 975 milliGRAM(s) Oral every 6 hours  albuterol/ipratropium for Nebulization 3 milliLiter(s) Nebulizer <User Schedule>  atorvastatin 40 milliGRAM(s) Oral at bedtime  buDESOnide    Inhalation Suspension 0.5 milliGRAM(s) Inhalation every 12 hours  cefTRIAXone   IVPB 1000 milliGRAM(s) IV Intermittent every 24 hours  cefTRIAXone   IVPB      finasteride 5 milliGRAM(s) Oral daily  gabapentin 300 milliGRAM(s) Oral every 8 hours  guaiFENesin ER 1200 milliGRAM(s) Oral every 12 hours  heparin  Infusion 1200 Unit(s)/Hr (16 mL/Hr) IV Continuous <Continuous>  insulin regular Infusion 3 Unit(s)/Hr (3 mL/Hr) IV Continuous <Continuous>  levothyroxine 25 MICROGram(s) Oral daily  methylPREDNISolone sodium succinate Injectable 20 milliGRAM(s) IV Push every 6 hours  metoprolol succinate ER 25 milliGRAM(s) Oral daily  montelukast 10 milliGRAM(s) Oral daily  multivitamin/minerals 1 Tablet(s) Oral daily  pantoprazole    Tablet 40 milliGRAM(s) Oral before breakfast  tamsulosin 0.8 milliGRAM(s) Oral at bedtime    MEDICATIONS  (PRN):      PHYSICAL EXAM:  Constitutional:  Respiratory:   Abdomen:   Extremities:     LABS:                        7.4    17.02 )-----------( 240      ( 08 Apr 2022 07:55 )             25.3     04-08    140  |  102  |  88<H>  ----------------------------<  194<H>  4.4   |  24  |  2.44<H>    Ca    9.2      08 Apr 2022 16:26  Phos  4.2     04-08  Mg     2.4     04-08      PT/INR - ( 08 Apr 2022 16:26 )   PT: 13.4 sec;   INR: 1.16 ratio         PTT - ( 08 Apr 2022 16:26 )  PTT:28.0 sec      ABO Interpretation: B (04-08-22 @ 07:08)      IMAGING:     Vascular Surgery Progress Note    Subjective/24hour Events: Patient seen and examined at bedside on AM rounds. No acute events overnight. Pain better.      Vital Signs:  Vital Signs Last 24 Hrs  T(C): 36.7 (09 Apr 2022 11:00), Max: 37.3 (08 Apr 2022 19:00)  T(F): 98.1 (09 Apr 2022 11:00), Max: 99.1 (08 Apr 2022 19:00)  HR: 73 (09 Apr 2022 11:02) (60 - 99)  BP: 129/71 (09 Apr 2022 11:00) (92/61 - 158/70)  BP(mean): 93 (09 Apr 2022 11:00) (70 - 101)  RR: 22 (09 Apr 2022 11:00) (13 - 50)  SpO2: 94% (09 Apr 2022 11:02) (87% - 99%)    CAPILLARY BLOOD GLUCOSE      POCT Blood Glucose.: 254 mg/dL (09 Apr 2022 12:12)  POCT Blood Glucose.: 267 mg/dL (09 Apr 2022 11:19)  POCT Blood Glucose.: 197 mg/dL (09 Apr 2022 10:17)  POCT Blood Glucose.: 129 mg/dL (09 Apr 2022 09:03)  POCT Blood Glucose.: 147 mg/dL (09 Apr 2022 08:13)  POCT Blood Glucose.: 170 mg/dL (09 Apr 2022 06:51)  POCT Blood Glucose.: 185 mg/dL (09 Apr 2022 06:05)  POCT Blood Glucose.: 190 mg/dL (09 Apr 2022 05:00)  POCT Blood Glucose.: 235 mg/dL (09 Apr 2022 03:58)  POCT Blood Glucose.: 267 mg/dL (09 Apr 2022 03:01)  POCT Blood Glucose.: 299 mg/dL (09 Apr 2022 02:00)  POCT Blood Glucose.: 327 mg/dL (09 Apr 2022 01:06)  POCT Blood Glucose.: 309 mg/dL (09 Apr 2022 00:04)  POCT Blood Glucose.: 319 mg/dL (08 Apr 2022 22:58)  POCT Blood Glucose.: 371 mg/dL (08 Apr 2022 22:00)  POCT Blood Glucose.: 358 mg/dL (08 Apr 2022 21:02)  POCT Blood Glucose.: 349 mg/dL (08 Apr 2022 20:01)  POCT Blood Glucose.: 299 mg/dL (08 Apr 2022 18:56)  POCT Blood Glucose.: 247 mg/dL (08 Apr 2022 18:01)  POCT Blood Glucose.: 208 mg/dL (08 Apr 2022 17:04)  POCT Blood Glucose.: 204 mg/dL (08 Apr 2022 15:58)  POCT Blood Glucose.: 181 mg/dL (08 Apr 2022 15:24)  POCT Blood Glucose.: 155 mg/dL (08 Apr 2022 14:10)  POCT Blood Glucose.: 93 mg/dL (08 Apr 2022 12:57)      I&O's Detail    08 Apr 2022 07:01  -  09 Apr 2022 07:00  --------------------------------------------------------  IN:    Heparin: 410 mL    Insulin: 142.5 mL    IV PiggyBack: 50 mL    Oral Fluid: 1080 mL  Total IN: 1682.5 mL    OUT:    Indwelling Catheter - Urethral (mL): 1445 mL  Total OUT: 1445 mL    Total NET: 237.5 mL      09 Apr 2022 07:01  -  09 Apr 2022 12:24  --------------------------------------------------------  IN:    Heparin: 80 mL    Insulin: 35 mL    IV PiggyBack: 50 mL  Total IN: 165 mL    OUT:    Indwelling Catheter - Urethral (mL): 235 mL  Total OUT: 235 mL    Total NET: -70 mL          Physical Exam:  Gen: NAD, resting comfortably in bed  CV: Regular rate  Resp: High flow nasal canula in place  Ext: RLE BKA stump with wound vac in place holding appropriate suction      Labs:    04-09    139  |  101  |  98<H>  ----------------------------<  241<H>  3.9   |  21<L>  |  2.24<H>    Ca    9.1      09 Apr 2022 04:03  Phos  4.4     04-09  Mg     2.5     04-09                              7.1    13.12 )-----------( 252      ( 09 Apr 2022 04:03 )             24.0     PT/INR - ( 09 Apr 2022 10:33 )   PT: 13.9 sec;   INR: 1.20 ratio         PTT - ( 09 Apr 2022 10:33 )  PTT:82.1 sec

## 2022-04-10 NOTE — CHART NOTE - NSCHARTNOTEFT_GEN_A_CORE
85 y/o M w/ uncontrolled Type 2 DM complicated by neuropathy and nephropathy with hyperglycemia exacerbated by steroids, HTN, HLD, hypothyroidism admitted for right LE AKA      Endocrine following for diabetes management.  POC glucose, insulin requirements, lab values reviewed.    Uncontrolled type 2 diabetes mellitus with hyperglycemia.   Glucose and insulin requirements are extremely variable with high doses of steroids( past 24 hours requiring 6-21 units per hr on gtt). Would continue with insulin gtt for now and assess insulin requirements once more stable.   per d/w team plan to discontinue steroids tomorrow, endocrine to f/u   Goal glucose 100-180  Outpt. endo follow-up  Outpt. optho, podiatry, nephrology  Plan to d/c on basal + orals including SGLT2 given CKD. Would avoid metformin and sulfonylurea.      d/w SICU Provider Cassandra        CAPILLARY BLOOD GLUCOSE      POCT Blood Glucose.: 193 mg/dL (10 Apr 2022 14:53)  POCT Blood Glucose.: 190 mg/dL (10 Apr 2022 13:17)  POCT Blood Glucose.: 190 mg/dL (10 Apr 2022 12:00)  POCT Blood Glucose.: 212 mg/dL (10 Apr 2022 11:08)  POCT Blood Glucose.: 237 mg/dL (10 Apr 2022 10:01)  POCT Blood Glucose.: 205 mg/dL (10 Apr 2022 08:58)  POCT Blood Glucose.: 134 mg/dL (10 Apr 2022 07:51)  POCT Blood Glucose.: 133 mg/dL (10 Apr 2022 06:51)  POCT Blood Glucose.: 150 mg/dL (10 Apr 2022 06:03)  POCT Blood Glucose.: 108 mg/dL (10 Apr 2022 05:08)  POCT Blood Glucose.: 127 mg/dL (10 Apr 2022 04:02)  POCT Blood Glucose.: 120 mg/dL (10 Apr 2022 02:58)  POCT Blood Glucose.: 152 mg/dL (10 Apr 2022 02:09)  POCT Blood Glucose.: 155 mg/dL (10 Apr 2022 01:06)  POCT Blood Glucose.: 154 mg/dL (10 Apr 2022 00:00)  POCT Blood Glucose.: 205 mg/dL (09 Apr 2022 22:57)  POCT Blood Glucose.: 213 mg/dL (09 Apr 2022 22:03)  POCT Blood Glucose.: 240 mg/dL (09 Apr 2022 20:56)  POCT Blood Glucose.: 251 mg/dL (09 Apr 2022 20:03)  POCT Blood Glucose.: 204 mg/dL (09 Apr 2022 18:53)  POCT Blood Glucose.: 165 mg/dL (09 Apr 2022 18:01)  POCT Blood Glucose.: 125 mg/dL (09 Apr 2022 17:11)  POCT Blood Glucose.: 153 mg/dL (09 Apr 2022 16:01)  POCT Blood Glucose.: 179 mg/dL (09 Apr 2022 15:07)

## 2022-04-10 NOTE — PROGRESS NOTE ADULT - ASSESSMENT
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 Post-op course c/b severe COPD exacerbation requiring HFNC. SICU consulted for respiratory monitoring.     PLAN:   Neuro: post-op pain  - Pain control: Tylenol ATC, gabapentin    Respiratory: COPD exacerbation   - 3L NC  - Monitor respiratory status  - Duonebs, mucinex, pulmicort, montelukast, methylprenidsolone 20mg IV q6h for COPD exacerbation    Cardiovascular: h/o HTN, HLD, AF  - Monitor vitals signs  - C/w home metoprolol for BP control -- metolazone, losartan HELD iso worsening KARLOS  - C/w home atorvastatin for HLD    Gastrointestinal: no active issues   - Diet: Regular  - Protonix 40mg PO qD'  - Bowel regimen: senna, miralax    Genitourinary/Renal: h/o urinary retention  - IVL'd  - C/w home flomax, finasteride  - Monitor UOP, Adair for strict I/O monitoring  - Trend electrolytes on BMP qD, replete PRN    Heme: no active issues   - Hep gtt for AF, monitor PTT qD  - ASA 81 HELD  - Trend H/H on CBC qD    ID: RLE 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

## 2022-04-10 NOTE — PROGRESS NOTE ADULT - SUBJECTIVE AND OBJECTIVE BOX
Ritesh Bell MD  Interventional Cardiology / Endovascular Specialist  [place] Office : [phone] [place] [address] [place] [address]  Tel:   [place] Office : [address] [facility] [place] [address]  Tel: [phone]    Pt is lying in bed in NAD   	  MEDICATIONS:  heparin  Infusion 1200 Unit(s)/Hr IV Continuous <Continuous>  metoprolol succinate ER 25 milliGRAM(s) Oral daily  tamsulosin 0.8 milliGRAM(s) Oral at bedtime    cefTRIAXone   IVPB      cefTRIAXone   IVPB 1000 milliGRAM(s) IV Intermittent every 24 hours    albuterol/ipratropium for Nebulization 3 milliLiter(s) Nebulizer <User Schedule>  buDESOnide    Inhalation Suspension 0.5 milliGRAM(s) Inhalation every 12 hours  guaiFENesin ER 1200 milliGRAM(s) Oral every 12 hours  montelukast 10 milliGRAM(s) Oral daily    acetaminophen     Tablet .. 975 milliGRAM(s) Oral every 6 hours  gabapentin 300 milliGRAM(s) Oral every 8 hours    pantoprazole    Tablet 40 milliGRAM(s) Oral before breakfast  polyethylene glycol 3350 17 Gram(s) Oral daily  senna 2 Tablet(s) Oral at bedtime    atorvastatin 40 milliGRAM(s) Oral at bedtime  finasteride 5 milliGRAM(s) Oral daily  insulin regular Infusion 3 Unit(s)/Hr IV Continuous <Continuous>  levothyroxine 25 MICROGram(s) Oral daily  methylPREDNISolone sodium succinate Injectable 20 milliGRAM(s) IV Push every 8 hours    multivitamin/minerals 1 Tablet(s) Oral daily      PAST MEDICAL/SURGICAL HISTORY  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    Deep Vein Thrombosis (DVT)  17 yeras ago on Coumadin    CHF (congestive heart failure)  last exacerbation in 1/2017    Enlarged prostate    GERD (gastroesophageal reflux disease)    Hernia  umblical    Calculus of bile duct without cholangitis or cholecystitis without obstruction    Atrial fibrillation    S/P Hernia Repair    S/P cataract surgery, unspecified laterality    S/P ERCP  3/2017        SOCIAL HISTORY: Substance Use (street drugs): ( x ) never used  (  ) other:    FAMILY HISTORY:      REVIEW OF SYSTEMS:  CONSTITUTIONAL: No fever, weight loss, or fatigue  EYES: No eye pain, visual disturbances, or discharge  ENMT:  No difficulty hearing, tinnitus, vertigo; No sinus or throat pain  BREASTS: No pain, masses, or nipple discharge  GASTROINTESTINAL: No abdominal or epigastric pain. No nausea, vomiting, or hematemesis; No diarrhea or constipation. No melena or hematochezia.  GENITOURINARY: No dysuria, frequency, hematuria, or incontinence  NEUROLOGICAL: No headaches, memory loss, loss of strength, numbness, or tremors  ENDOCRINE: No heat or cold intolerance; No hair loss  MUSCULOSKELETAL: No joint pain or swelling; No muscle, back, or extremity pain  PSYCHIATRIC: No depression, anxiety, mood swings, or difficulty sleeping  HEME/LYMPH: No easy bruising, or bleeding gums  All others negative    PHYSICAL EXAM:  T(C): 36.3 (04-10-22 @ 11:00), Max: 36.6 (04-09-22 @ 19:00)  HR: 72 (04-10-22 @ 15:12) (66 - 100)  BP: 131/61 (04-10-22 @ 14:00) (107/54 - 158/69)  RR: 23 (04-10-22 @ 14:00) (14 - 32)  SpO2: 94% (04-10-22 @ 15:12) (88% - 98%)  Wt(kg): --  I&O's Summary    09 Apr 2022 07:01  -  10 Apr 2022 07:00  --------------------------------------------------------  IN: 1817.5 mL / OUT: 1410 mL / NET: 407.5 mL    10 Apr 2022 07:01  -  10 Apr 2022 15:34  --------------------------------------------------------  IN: 617 mL / OUT: 775 mL / NET: -158 mL        GENERAL: NAD  EYES:   PERRLA   ENMT:   Moist mucous membranes, Good dentition, No lesions  Cardiovascular: Normal S1 S2, No JVD, No murmurs, No edema  Respiratory: scattered wheezing   Gastrointestinal:  Soft, Non-tender, + BS	  Extremities: right bka                               8.6    11.11 )-----------( 255      ( 10 Apr 2022 06:11 )             29.0     04-10    141  |  103  |  103<H>  ----------------------------<  151<H>  3.6   |  22  |  1.82<H>    Ca    9.1      10 Apr 2022 00:45  Phos  3.6     04-10  Mg     2.4     04-10      proBNP:   Lipid Profile:   HgA1c:   TSH:     Consultant(s) Notes Reviewed:  [x ] YES  [ ] NO    Care Discussed with Consultants/Other Providers [ x] YES  [ ] NO    Imaging Personally Reviewed independently:  [x] YES  [ ] NO    All labs, radiologic studies, vitals, orders and medications list reviewed. Patient is seen and examined at bedside. Case discussed with medical team.

## 2022-04-10 NOTE — PROGRESS NOTE ADULT - SUBJECTIVE AND OBJECTIVE BOX
HPI:  86y M with Hx DM, HTN, COPD, and HLD who presented with RLE pain (s/p angio 9/2021), found to have wet gangrene s/p R laura JAMES 4/4. Post-op course c/b severe COPD exacerbation requiring HFNC. SICU consulted for respiratory monitoring.     24 HOUR EVENTS:  - Weaned from HFNC to 3L NC   - 1u pRBC for H/H 6.9/23.5    SUBJECTIVE/ROS:  Patient seen at bedside this AM.     OBJECTIVE:    VITALS:  Vital Signs Last 24 Hrs  T(C): 36.6 (09 Apr 2022 23:00), Max: 36.7 (09 Apr 2022 11:00)  T(F): 97.9 (09 Apr 2022 23:00), Max: 98.1 (09 Apr 2022 11:00)  HR: 72 (10 Apr 2022 02:00) (60 - 100)  BP: 107/54 (10 Apr 2022 02:00) (107/54 - 158/69)  BP(mean): 78 (10 Apr 2022 02:00) (78 - 99)  RR: 14 (10 Apr 2022 02:00) (14 - 50)  SpO2: 91% (10 Apr 2022 02:00) (90% - 99%)    I&O's Summary    08 Apr 2022 07:01  -  09 Apr 2022 07:00  --------------------------------------------------------  IN: 1682.5 mL / OUT: 1445 mL / NET: 237.5 mL    09 Apr 2022 07:01  -  10 Apr 2022 02:52  --------------------------------------------------------  IN: 1282 mL / OUT: 1410 mL / NET: -128 mL      PHYSICAL EXAM:    NEURO  Exam: awake, alert, oriented    RESPIRATORY  Exam: no increased WOB on HFNC 40L/50%    CARDIOVASCULAR  Exam: warm, well-perfused; RLE BKA dressing with sanguineous strikethrough   Cardiac Rhythm: AF noted on cardiac monitor    GI/NUTRITION  Exam: soft, non-distended, non-tender    GENITOURINARY  Exam: Adair catheter in place, draining clear yellow urine     ACCESS DEVICES:  [2] Peripheral IV  [ ] Central Venous Line	[ ] R	[ ] L	[ ] IJ	[ ] Fem	[ ] SC	Placed:   [ ] Arterial Line		[ ] R	[ ] L	[ ] Fem	[ ] Rad	[ ] Ax	Placed:   [ ] PICC:					[ ] Mediport  [x] Urinary Catheter, Date Placed: 4/7  [x] Necessity of urinary, arterial, and venous catheters discussed      LABS:             6.9    11.13 )-----------( 243      ( 10 Apr 2022 00:45 )             23.5     141  |  103  |  103<H>  ----------------------------<  151<H>  3.6   |  22  |  1.82<H>    Ca    9.1      10 Apr 2022 00:45  Phos  3.6     04-10  Mg     2.4     04-10    POCT Blood Glucose.: 152 mg/dL (10 Apr 2022 02:09)  POCT Blood Glucose.: 155 mg/dL (10 Apr 2022 01:06)  POCT Blood Glucose.: 154 mg/dL (10 Apr 2022 00:00)  POCT Blood Glucose.: 205 mg/dL (09 Apr 2022 22:57)  POCT Blood Glucose.: 213 mg/dL (09 Apr 2022 22:03)  POCT Blood Glucose.: 240 mg/dL (09 Apr 2022 20:56)  POCT Blood Glucose.: 251 mg/dL (09 Apr 2022 20:03)  POCT Blood Glucose.: 204 mg/dL (09 Apr 2022 18:53)  POCT Blood Glucose.: 165 mg/dL (09 Apr 2022 18:01)  POCT Blood Glucose.: 125 mg/dL (09 Apr 2022 17:11)  POCT Blood Glucose.: 153 mg/dL (09 Apr 2022 16:01)  POCT Blood Glucose.: 179 mg/dL (09 Apr 2022 15:07)  POCT Blood Glucose.: 202 mg/dL (09 Apr 2022 14:00)  POCT Blood Glucose.: 238 mg/dL (09 Apr 2022 13:00)  POCT Blood Glucose.: 254 mg/dL (09 Apr 2022 12:12)  POCT Blood Glucose.: 267 mg/dL (09 Apr 2022 11:19)  POCT Blood Glucose.: 197 mg/dL (09 Apr 2022 10:17)  POCT Blood Glucose.: 129 mg/dL (09 Apr 2022 09:03)  POCT Blood Glucose.: 147 mg/dL (09 Apr 2022 08:13)  POCT Blood Glucose.: 170 mg/dL (09 Apr 2022 06:51)  POCT Blood Glucose.: 185 mg/dL (09 Apr 2022 06:05)  POCT Blood Glucose.: 190 mg/dL (09 Apr 2022 05:00)  POCT Blood Glucose.: 235 mg/dL (09 Apr 2022 03:58)  POCT Blood Glucose.: 267 mg/dL (09 Apr 2022 03:01)      MEDICATIONS:   MEDICATIONS  (STANDING):  acetaminophen     Tablet .. 975 milliGRAM(s) Oral every 6 hours  albuterol/ipratropium for Nebulization 3 milliLiter(s) Nebulizer <User Schedule>  atorvastatin 40 milliGRAM(s) Oral at bedtime  buDESOnide    Inhalation Suspension 0.5 milliGRAM(s) Inhalation every 12 hours  cefTRIAXone   IVPB 1000 milliGRAM(s) IV Intermittent every 24 hours  cefTRIAXone   IVPB      finasteride 5 milliGRAM(s) Oral daily  gabapentin 300 milliGRAM(s) Oral every 8 hours  guaiFENesin ER 1200 milliGRAM(s) Oral every 12 hours  heparin  Infusion 1200 Unit(s)/Hr (16 mL/Hr) IV Continuous <Continuous>  insulin regular Infusion 3 Unit(s)/Hr (3 mL/Hr) IV Continuous <Continuous>  levothyroxine 25 MICROGram(s) Oral daily  methylPREDNISolone sodium succinate Injectable 20 milliGRAM(s) IV Push every 6 hours  metoprolol succinate ER 25 milliGRAM(s) Oral daily  montelukast 10 milliGRAM(s) Oral daily  multivitamin/minerals 1 Tablet(s) Oral daily  pantoprazole    Tablet 40 milliGRAM(s) Oral before breakfast  polyethylene glycol 3350 17 Gram(s) Oral daily  potassium chloride    Tablet ER 40 milliEquivalent(s) Oral once  senna 2 Tablet(s) Oral at bedtime  tamsulosin 0.8 milliGRAM(s) Oral at bedtime

## 2022-04-10 NOTE — PROGRESS NOTE ADULT - ASSESSMENT
86M with PMH of COPD (not on home o2), prior smoking history (40 pack years), HTN, HLD, Afib, CHF, T2DM, CVA, BPH, and PAD admitted for elective RLE AKA. Renal consulted for CKD Mx.    KARLOS on CKD 3,   baseline Cr ~1.7  serum k stable  rise likely 2/2 hemodynamic changes--> ATN  s/p hudson --> 40 to 50cc urineoutput  today Serum Creatinine improving    Plan  cont monitor Serum Creatinine   plan to add diuretics once Serum Creatinine stable  off losartan   monitor BMP daily and u/o   dose all meds for eGFR  avoid NSAIDs/Nephrotoxics.    HTN, controlled. bp optimal   continue ARB BB. monitor bp    Rt LE nonhealing wound:  s/p amputation  follow up with vascular

## 2022-04-10 NOTE — PROGRESS NOTE ADULT - ASSESSMENT
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 limiting diaphragmatic excursion and respiratory muscle weakness decreasing chest wall expansion -> atelectasis  3) no evidence of pulmonary edema, pleural effusions or pneumonia on the most recent CXR    leukocytosis more likely related to systemic steroids than infection    CKD/KARLOS due to diuresis in the setting of euvolemia    PLAN/RECOMMENDATIONS:    oxygen supplementation to keep saturation greater than 92% to avoid limb ischemia - continue HFNC for sat >92%  ceftriaxone x 5 days  Decrease solumedrol 20mg IVP q8h - glucose control on steroids,   albuterol/atrovent nebs q6h  pulmicort 0.5mg nebs q12h - give after duoneb - rinse mouth after use  mucinex 1200mg 2 times daily  singulair 10mg @ bedtime  acapella device/incentive spirometer  vascular surgery follow-up noted    respiratory status needs to be further optimized prior to surgery    heparin gtt  pain control  GI prophylaxis - protonix    Javier Bazzi MD  Pulmonary Medicine  [phone]   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 limiting diaphragmatic excursion and respiratory muscle weakness decreasing chest wall expansion -> atelectasis  3) no evidence of pulmonary edema, pleural effusions or pneumonia on the most recent CXR    leukocytosis more likely related to systemic steroids than infection    CKD/KARLOS due to diuresis in the setting of euvolemia    PLAN/RECOMMENDATIONS:    oxygen supplementation to keep saturation greater than 92% to avoid limb ischemia - continue HFNC for sat >92%  ceftriaxone x 5 days  Decrease solumedrol 20mg IVP q8h - glucose control on steroids,   albuterol/atrovent nebs q6h  pulmicort 0.5mg nebs q12h - give after duoneb - rinse mouth after use  mucinex 1200mg 2 times daily  singulair 10mg @ bedtime  acapella device/incentive spirometer  vascular surgery follow-up noted    respiratory status needs to be further optimized prior to surgery    heparin gtt  pain control  GI prophylaxis - protonix  CXR looks similar to 4/9    Javier Bazzi MD  Pulmonary Medicine  [phone]

## 2022-04-10 NOTE — PROGRESS NOTE ADULT - SUBJECTIVE AND OBJECTIVE BOX
Vascular Surgery Progress Note    Subjective/24hour Events:       Vital Signs:  Vital Signs Last 24 Hrs  T(C): 36 (10 Apr 2022 03:00), Max: 36.7 (09 Apr 2022 11:00)  T(F): 96.8 (10 Apr 2022 03:00), Max: 98.1 (09 Apr 2022 11:00)  HR: 70 (10 Apr 2022 04:00) (60 - 100)  BP: 147/64 (10 Apr 2022 04:00) (107/54 - 158/69)  BP(mean): 92 (10 Apr 2022 04:00) (78 - 99)  RR: 14 (10 Apr 2022 04:00) (14 - 50)  SpO2: 95% (10 Apr 2022 04:00) (90% - 99%)    CAPILLARY BLOOD GLUCOSE      POCT Blood Glucose.: 127 mg/dL (10 Apr 2022 04:02)  POCT Blood Glucose.: 120 mg/dL (10 Apr 2022 02:58)  POCT Blood Glucose.: 152 mg/dL (10 Apr 2022 02:09)  POCT Blood Glucose.: 155 mg/dL (10 Apr 2022 01:06)  POCT Blood Glucose.: 154 mg/dL (10 Apr 2022 00:00)  POCT Blood Glucose.: 205 mg/dL (09 Apr 2022 22:57)  POCT Blood Glucose.: 213 mg/dL (09 Apr 2022 22:03)  POCT Blood Glucose.: 240 mg/dL (09 Apr 2022 20:56)  POCT Blood Glucose.: 251 mg/dL (09 Apr 2022 20:03)  POCT Blood Glucose.: 204 mg/dL (09 Apr 2022 18:53)  POCT Blood Glucose.: 165 mg/dL (09 Apr 2022 18:01)  POCT Blood Glucose.: 125 mg/dL (09 Apr 2022 17:11)  POCT Blood Glucose.: 153 mg/dL (09 Apr 2022 16:01)  POCT Blood Glucose.: 179 mg/dL (09 Apr 2022 15:07)  POCT Blood Glucose.: 202 mg/dL (09 Apr 2022 14:00)  POCT Blood Glucose.: 238 mg/dL (09 Apr 2022 13:00)  POCT Blood Glucose.: 254 mg/dL (09 Apr 2022 12:12)  POCT Blood Glucose.: 267 mg/dL (09 Apr 2022 11:19)  POCT Blood Glucose.: 197 mg/dL (09 Apr 2022 10:17)  POCT Blood Glucose.: 129 mg/dL (09 Apr 2022 09:03)  POCT Blood Glucose.: 147 mg/dL (09 Apr 2022 08:13)  POCT Blood Glucose.: 170 mg/dL (09 Apr 2022 06:51)  POCT Blood Glucose.: 185 mg/dL (09 Apr 2022 06:05)      I&O's Detail    08 Apr 2022 07:01  -  09 Apr 2022 07:00  --------------------------------------------------------  IN:    Heparin: 410 mL    Insulin: 142.5 mL    IV PiggyBack: 50 mL    Oral Fluid: 1080 mL  Total IN: 1682.5 mL    OUT:    Indwelling Catheter - Urethral (mL): 1445 mL  Total OUT: 1445 mL    Total NET: 237.5 mL      09 Apr 2022 07:01  -  10 Apr 2022 05:07  --------------------------------------------------------  IN:    Heparin: 352 mL    Insulin: 234.5 mL    IV PiggyBack: 50 mL    Oral Fluid: 720 mL    PRBCs (Packed Red Blood Cells): 300 mL  Total IN: 1656.5 mL    OUT:    Indwelling Catheter - Urethral (mL): 1410 mL  Total OUT: 1410 mL    Total NET: 246.5 mL          Physical Exam:  Gen: NAD, resting comfortably in bed  CV: Regular rate  Resp: High flow nasal canula in place  Ext: RLE BKA stump with wound vac in place holding appropriate suction      Labs:    04-10    141  |  103  |  103<H>  ----------------------------<  151<H>  3.6   |  22  |  1.82<H>    Ca    9.1      10 Apr 2022 00:45  Phos  3.6     04-10  Mg     2.4     04-10                              6.9    11.13 )-----------( 243      ( 10 Apr 2022 00:45 )             23.5     PT/INR - ( 10 Apr 2022 00:43 )   PT: 12.6 sec;   INR: 1.09 ratio         PTT - ( 10 Apr 2022 00:43 )  PTT:79.9 sec   Vascular Surgery Progress Note    Subjective/24hour Events: Denies any new complaints. Tolerating breakfast.       Vital Signs:  Vital Signs Last 24 Hrs  T(C): 36 (10 Apr 2022 03:00), Max: 36.7 (09 Apr 2022 11:00)  T(F): 96.8 (10 Apr 2022 03:00), Max: 98.1 (09 Apr 2022 11:00)  HR: 70 (10 Apr 2022 04:00) (60 - 100)  BP: 147/64 (10 Apr 2022 04:00) (107/54 - 158/69)  BP(mean): 92 (10 Apr 2022 04:00) (78 - 99)  RR: 14 (10 Apr 2022 04:00) (14 - 50)  SpO2: 95% (10 Apr 2022 04:00) (90% - 99%)    CAPILLARY BLOOD GLUCOSE      POCT Blood Glucose.: 127 mg/dL (10 Apr 2022 04:02)  POCT Blood Glucose.: 120 mg/dL (10 Apr 2022 02:58)  POCT Blood Glucose.: 152 mg/dL (10 Apr 2022 02:09)  POCT Blood Glucose.: 155 mg/dL (10 Apr 2022 01:06)  POCT Blood Glucose.: 154 mg/dL (10 Apr 2022 00:00)  POCT Blood Glucose.: 205 mg/dL (09 Apr 2022 22:57)  POCT Blood Glucose.: 213 mg/dL (09 Apr 2022 22:03)  POCT Blood Glucose.: 240 mg/dL (09 Apr 2022 20:56)  POCT Blood Glucose.: 251 mg/dL (09 Apr 2022 20:03)  POCT Blood Glucose.: 204 mg/dL (09 Apr 2022 18:53)  POCT Blood Glucose.: 165 mg/dL (09 Apr 2022 18:01)  POCT Blood Glucose.: 125 mg/dL (09 Apr 2022 17:11)  POCT Blood Glucose.: 153 mg/dL (09 Apr 2022 16:01)  POCT Blood Glucose.: 179 mg/dL (09 Apr 2022 15:07)  POCT Blood Glucose.: 202 mg/dL (09 Apr 2022 14:00)  POCT Blood Glucose.: 238 mg/dL (09 Apr 2022 13:00)  POCT Blood Glucose.: 254 mg/dL (09 Apr 2022 12:12)  POCT Blood Glucose.: 267 mg/dL (09 Apr 2022 11:19)  POCT Blood Glucose.: 197 mg/dL (09 Apr 2022 10:17)  POCT Blood Glucose.: 129 mg/dL (09 Apr 2022 09:03)  POCT Blood Glucose.: 147 mg/dL (09 Apr 2022 08:13)  POCT Blood Glucose.: 170 mg/dL (09 Apr 2022 06:51)  POCT Blood Glucose.: 185 mg/dL (09 Apr 2022 06:05)      I&O's Detail    08 Apr 2022 07:01  -  09 Apr 2022 07:00  --------------------------------------------------------  IN:    Heparin: 410 mL    Insulin: 142.5 mL    IV PiggyBack: 50 mL    Oral Fluid: 1080 mL  Total IN: 1682.5 mL    OUT:    Indwelling Catheter - Urethral (mL): 1445 mL  Total OUT: 1445 mL    Total NET: 237.5 mL      09 Apr 2022 07:01  -  10 Apr 2022 05:07  --------------------------------------------------------  IN:    Heparin: 352 mL    Insulin: 234.5 mL    IV PiggyBack: 50 mL    Oral Fluid: 720 mL    PRBCs (Packed Red Blood Cells): 300 mL  Total IN: 1656.5 mL    OUT:    Indwelling Catheter - Urethral (mL): 1410 mL  Total OUT: 1410 mL    Total NET: 246.5 mL          Physical Exam:  Gen: NAD  CV: Regular rate  Resp: breathing comfortably on room air  Ext: RLE BKA stump with wound vac in place holding appropriate suction      Labs:    04-10    141  |  103  |  103<H>  ----------------------------<  151<H>  3.6   |  22  |  1.82<H>    Ca    9.1      10 Apr 2022 00:45  Phos  3.6     04-10  Mg     2.4     04-10                              6.9    11.13 )-----------( 243      ( 10 Apr 2022 00:45 )             23.5     PT/INR - ( 10 Apr 2022 00:43 )   PT: 12.6 sec;   INR: 1.09 ratio         PTT - ( 10 Apr 2022 00:43 )  PTT:79.9 sec

## 2022-04-10 NOTE — PROGRESS NOTE ADULT - ASSESSMENT
EKG -  A sense V paced PVC's  Echo - Mild LV dysfunction mild aortic stenosis    a/p     1) Preop clearance for AKA - pt has h/o moderate LV dysfunction as per echo 2017, no chest pains 2d echo now shows mild LV dysfunction  , 12 lead EKG ok,  PPM interrogated , s/p BKA , pt wheezing b/l f/u pulm recs now on steroids,  consider CT ctest non con     2) HTN - continue losartan and metoprolol     3) Chronic systolic CHF - euvolemic on exam hold metolazone     4) ?Atrial fib - coumadin on hold on IV heparin     5) KARLOS : held losartan and metolazone renal function improving

## 2022-04-10 NOTE — PROGRESS NOTE ADULT - ASSESSMENT
86y male with PMHx of HTN, HLD, DM2 and nonhealing wounds of RLE who is non-ambulatory at home now s/p right guillotine BELOW knee amputation. Postoperative course c/b severe COPD exacerbation requiring excalation of O2 supplementation with HFNC. SICU consulted for close respiratory monitoring in the setting of COPD exacerbation. Improved saturation and work of breathing, on HFNC.     Plan:  - Maintain wound vac, next vac change Monday  - Continue hep gtt, goal aPTT 59-99  - Continue IV abx: Rocephin  - Pain control  - Regular diet  - Glucose control  - Planning for completion AKA next week  - Appreciate pulmonary recs  - Appreciate excellent SICU care      Vascular Surgery  [id number] 86y male with PMHx of HTN, HLD, DM2 and nonhealing wounds of RLE who is non-ambulatory at home now s/p right guillotine BELOW knee amputation. Postoperative course c/b severe COPD exacerbation requiring excalation of O2 supplementation with HFNC. SICU consulted for close respiratory monitoring in the setting of COPD exacerbation. Improved saturation and work of breathing, now on room air.     - Maintain wound vac, next vac change Monday  - Continue hep gtt, goal aPTT 59-99  - Continue IV abx: Rocephin  - Pain control  - Regular diet  - Glucose control  - Planning for completion AKA next week  - Appreciate pulmonary recs  - Appreciate excellent SICU care    Vascular Surgery  [id number]

## 2022-04-10 NOTE — PROGRESS NOTE ADULT - SUBJECTIVE AND OBJECTIVE BOX
Follow-up Pulm Progress Note    The patient was seen and examined. Notes reviewed and discussed with staff/team as applicable      No new respiratory events overnight.  Sitting in chair, no distress on NC O2. Oriented to place    Denies: increased SOB, Chest pain, increased cough, colored phlegm, hemoptysis, N/V/D,       Vital Signs Last 24 Hrs  T(C): 36.3 (10 Apr 2022 08:00), Max: 36.7 (09 Apr 2022 11:00)  T(F): 97.3 (10 Apr 2022 08:00), Max: 98.1 (09 Apr 2022 11:00)  HR: 84 (10 Apr 2022 09:00) (60 - 100)  BP: 132/60 (10 Apr 2022 09:00) (107/54 - 158/69)  BP(mean): 87 (10 Apr 2022 09:00) (78 - 99)  RR: 22 (10 Apr 2022 09:00) (14 - 50)  SpO2: 88% (10 Apr 2022 09:00) (88% - 98%)    ABG - ( 09 Apr 2022 03:35 )  pH, Arterial: 7.43  pH, Blood: x     /  pCO2: 37    /  pO2: 103   / HCO3: 25    / Base Excess: 0.3   /  SaO2: 99.6                  04-09 @ 07:01  -  04-10 @ 07:00  --------------------------------------------------------  IN: 1817.5 mL / OUT: 1410 mL / NET: 407.5 mL          Medications:  MEDICATIONS  (STANDING):  acetaminophen     Tablet .. 975 milliGRAM(s) Oral every 6 hours  albuterol/ipratropium for Nebulization 3 milliLiter(s) Nebulizer <User Schedule>  atorvastatin 40 milliGRAM(s) Oral at bedtime  buDESOnide    Inhalation Suspension 0.5 milliGRAM(s) Inhalation every 12 hours  cefTRIAXone   IVPB      cefTRIAXone   IVPB 1000 milliGRAM(s) IV Intermittent every 24 hours  finasteride 5 milliGRAM(s) Oral daily  gabapentin 300 milliGRAM(s) Oral every 8 hours  guaiFENesin ER 1200 milliGRAM(s) Oral every 12 hours  heparin  Infusion 1200 Unit(s)/Hr (16 mL/Hr) IV Continuous <Continuous>  insulin regular Infusion 3 Unit(s)/Hr (3 mL/Hr) IV Continuous <Continuous>  levothyroxine 25 MICROGram(s) Oral daily  methylPREDNISolone sodium succinate Injectable 20 milliGRAM(s) IV Push every 6 hours  metoprolol succinate ER 25 milliGRAM(s) Oral daily  montelukast 10 milliGRAM(s) Oral daily  multivitamin/minerals 1 Tablet(s) Oral daily  pantoprazole    Tablet 40 milliGRAM(s) Oral before breakfast  polyethylene glycol 3350 17 Gram(s) Oral daily  senna 2 Tablet(s) Oral at bedtime  tamsulosin 0.8 milliGRAM(s) Oral at bedtime    MEDICATIONS  (PRN):      Allergies    No Known Allergies    Intolerances    Physical Examination:    Pleasant elderly man, in chair on O2  Neck: no JVD, LAD, accessory muscle use  PULM: Decreased BS bases, no wheezes  CVS: Regular rate and rhythm, S1S2, no murmurs, rubs, or gallops  Abdomen: obese, soft, NT  Extremities: s/p R BKA,         LABS:                        8.6    11.11 )-----------( 255      ( 10 Apr 2022 06:11 )             29.0     04-10    141  |  103  |  103<H>  ----------------------------<  151<H>  3.6   |  22  |  1.82<H>    Ca    9.1      10 Apr 2022 00:45  Phos  3.6     04-10  Mg     2.4     04-10            CAPILLARY BLOOD GLUCOSE      POCT Blood Glucose.: 205 mg/dL (10 Apr 2022 08:58)    PT/INR - ( 10 Apr 2022 00:43 )   PT: 12.6 sec;   INR: 1.09 ratio         PTT - ( 10 Apr 2022 00:43 )  PTT:79.9 sec    RADIOLOGY REVIEWED    CXR:    < from: Xray Chest 1 View- PORTABLE-Routine (Xray Chest 1 View- PORTABLE-Routine in AM.) (04.10.22 @ 07:06) >  ACC: [id number] EXAM:  XR CHEST PORTABLE ROUTINE 1V                          PROCEDURE DATE:  04/10/2022          INTERPRETATION:  EXAMINATION: XR CHEST    CLINICAL INDICATION: Evaluate atelectasis.    TECHNIQUE: Single frontal portable view of the chest from 4/10/2022 7:06   AM    COMPARISON: Chest x-ray 4/9/2022.    FINDINGS:  Left pacemaker with leads in the right atrium and ventricle.    The heart size is not accurately assessed on this projection.  Bilateral lower lung field patchy opacities, left greater than right.  There is no pneumothorax. Trace left pleural effusion.    IMPRESSION:  Bilateral patchy opacities, left greater than right, differential   includes atelectasis or infection.    --- End of Report ---          JAYDON MONTEMAYOR MD; Resident Radiologist  This document has been electronically signed.  SATURNINO CHERRY MD; Attending Radiologist  This document has been electronically signed. Apr 10 2022  9:14AM    < end of copied text >

## 2022-04-10 NOTE — PROGRESS NOTE ADULT - SUBJECTIVE AND OBJECTIVE BOX
[facility] : Ans Serv [phone], Office [phone]  Dr Melendez/Dr Wall  /Dr Alex butt /Dr SHAHANA Knight/Dr Johsua Jang/Dr Jeremi Lee /Dr KENDALL Mora  _______________________________________________________________________________________________    seen and examined today for renal failure  Interval : Serum Creatinine improving   VITALS:  T(F): 97.3 (04-10-22 @ 08:00), Max: 97.9 (04-09-22 @ 19:00)  HR: 78 (04-10-22 @ 11:00)  BP: 134/61 (04-10-22 @ 11:00)  RR: 19 (04-10-22 @ 11:00)  SpO2: 91% (04-10-22 @ 11:00)    04-09 @ 07:01  -  04-10 @ 07:00  --------------------------------------------------------  IN: 1817.5 mL / OUT: 1410 mL / NET: 407.5 mL    04-10 @ 07:01  -  04-10 @ 11:17  --------------------------------------------------------  IN: 164 mL / OUT: 775 mL / NET: -611 mL    Physical Exam :-  Constitutional: NAD  Respiratory: Bilateral equal breath sounds, no Crackles present.  Cardiovascular: S1, S2 normal, positive Murmur  Gastrointestinal: Bowel Sounds present, soft, non tender.  Extremities: + Edema Feet left lower ext   Neurological: Alert and Oriented x 3  Psychiatric: Normal mood, normal affect  Data:-  Allergies :   No Known Allergies    Hospital Medications:   MEDICATIONS  (STANDING):  acetaminophen     Tablet .. 975 milliGRAM(s) Oral every 6 hours  albuterol/ipratropium for Nebulization 3 milliLiter(s) Nebulizer <User Schedule>  atorvastatin 40 milliGRAM(s) Oral at bedtime  buDESOnide    Inhalation Suspension 0.5 milliGRAM(s) Inhalation every 12 hours  cefTRIAXone   IVPB 1000 milliGRAM(s) IV Intermittent every 24 hours  cefTRIAXone   IVPB      finasteride 5 milliGRAM(s) Oral daily  gabapentin 300 milliGRAM(s) Oral every 8 hours  guaiFENesin ER 1200 milliGRAM(s) Oral every 12 hours  heparin  Infusion 1200 Unit(s)/Hr (16 mL/Hr) IV Continuous <Continuous>  insulin regular Infusion 3 Unit(s)/Hr (3 mL/Hr) IV Continuous <Continuous>  levothyroxine 25 MICROGram(s) Oral daily  methylPREDNISolone sodium succinate Injectable 20 milliGRAM(s) IV Push every 8 hours  metoprolol succinate ER 25 milliGRAM(s) Oral daily  montelukast 10 milliGRAM(s) Oral daily  multivitamin/minerals 1 Tablet(s) Oral daily  pantoprazole    Tablet 40 milliGRAM(s) Oral before breakfast  polyethylene glycol 3350 17 Gram(s) Oral daily  senna 2 Tablet(s) Oral at bedtime  tamsulosin 0.8 milliGRAM(s) Oral at bedtime    04-10    141  |  103  |  103<H>  ----------------------------<  151<H>  3.6   |  22  |  1.82<H>    Ca    9.1      10 Apr 2022 00:45  Phos  3.6     04-10  Mg     2.4     04-10      Creatinine Trend: 1.82 <--, 2.24 <--, 2.44 <--, 2.28 <--, 2.25 <--, 1.89 <--, 1.68 <--, 1.56 <--, 1.52 <--                        8.6    11.11 )-----------( 255      ( 10 Apr 2022 06:11 )             29.0    No

## 2022-04-11 NOTE — PROGRESS NOTE ADULT - SUBJECTIVE AND OBJECTIVE BOX
Ritesh Bell MD  Interventional Cardiology / Advance Heart Failure and Cardiac Transplant Specialist  [place] Office : [phone] [place] [address] [place] [address]  Tel:   [place] Office : [address] [facility] [place] [address]  Tel: [phone]       Pt is lying in bed comfortable some SOB  	  MEDICATIONS:  heparin  Infusion 1200 Unit(s)/Hr IV Continuous <Continuous>  metoprolol succinate ER 25 milliGRAM(s) Oral daily  tamsulosin 0.8 milliGRAM(s) Oral at bedtime    cefTRIAXone   IVPB 1000 milliGRAM(s) IV Intermittent every 24 hours  cefTRIAXone   IVPB        albuterol/ipratropium for Nebulization 3 milliLiter(s) Nebulizer <User Schedule>  buDESOnide    Inhalation Suspension 0.5 milliGRAM(s) Inhalation every 12 hours  guaiFENesin ER 1200 milliGRAM(s) Oral every 12 hours  montelukast 10 milliGRAM(s) Oral daily    acetaminophen     Tablet .. 975 milliGRAM(s) Oral every 6 hours  gabapentin 300 milliGRAM(s) Oral every 8 hours  oxyCODONE    IR 5 milliGRAM(s) Oral every 4 hours PRN    pantoprazole    Tablet 40 milliGRAM(s) Oral before breakfast  polyethylene glycol 3350 17 Gram(s) Oral daily  senna 2 Tablet(s) Oral at bedtime    atorvastatin 40 milliGRAM(s) Oral at bedtime  finasteride 5 milliGRAM(s) Oral daily  insulin regular Infusion 3 Unit(s)/Hr IV Continuous <Continuous>  levothyroxine 25 MICROGram(s) Oral daily  methylPREDNISolone sodium succinate Injectable 20 milliGRAM(s) IV Push every 8 hours    multivitamin/minerals 1 Tablet(s) Oral daily      PAST MEDICAL/SURGICAL HISTORY  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    Deep Vein Thrombosis (DVT)  17 yeras ago on Coumadin    CHF (congestive heart failure)  last exacerbation in 1/2017    Enlarged prostate    GERD (gastroesophageal reflux disease)    Hernia  umblical    Calculus of bile duct without cholangitis or cholecystitis without obstruction    Atrial fibrillation    S/P Hernia Repair    S/P cataract surgery, unspecified laterality    S/P ERCP  3/2017        SOCIAL HISTORY: Substance Use (street drugs): ( x ) never used  (  ) other:    FAMILY HISTORY:         PHYSICAL EXAM:  T(C): 36.9 (04-11-22 @ 19:00), Max: 36.9 (04-11-22 @ 19:00)  HR: 80 (04-11-22 @ 22:23) (60 - 119)  BP: 139/65 (04-11-22 @ 21:00) (114/55 - 184/81)  RR: 24 (04-11-22 @ 21:00) (12 - 26)  SpO2: 97% (04-11-22 @ 22:23) (90% - 99%)  Wt(kg): --  I&O's Summary    10 Apr 2022 07:01  -  11 Apr 2022 07:00  --------------------------------------------------------  IN: 1153 mL / OUT: 3022 mL / NET: -1869 mL    11 Apr 2022 07:01  -  11 Apr 2022 22:25  --------------------------------------------------------  IN: 478.5 mL / OUT: 922 mL / NET: -443.5 mL             EYES:   PERRLA   ENMT:   Moist mucous membranes, Good dentition, No lesions  Cardiovascular: Normal S1 S2, No JVD, No murmurs, No edema  Respiratory: b/l rhonchi   Gastrointestinal:  Soft, Non-tender, + BS	  Extremities: s/p BKA                                    8.9    15.11 )-----------( 285      ( 10 Apr 2022 23:21 )             29.0     04-10    141  |  104  |  98<H>  ----------------------------<  155<H>  4.1   |  20<L>  |  1.73<H>    Ca    9.5      10 Apr 2022 23:21  Phos  3.3     04-10  Mg     2.3     04-10      proBNP:   Lipid Profile:   HgA1c:   TSH:     Consultant(s) Notes Reviewed:  [x ] YES  [ ] NO    Care Discussed with Consultants/Other Providers [ x] YES  [ ] NO    Imaging Personally Reviewed independently:  [x] YES  [ ] NO    All labs, radiologic studies, vitals, orders and medications list reviewed. Patient is seen and examined at bedside. Case discussed with medical team.

## 2022-04-11 NOTE — PROGRESS NOTE ADULT - SUBJECTIVE AND OBJECTIVE BOX
Patient  seen and examined  no complaints    No Known Allergies    Hospital Medications:   MEDICATIONS  (STANDING):  acetaminophen     Tablet .. 975 milliGRAM(s) Oral every 6 hours  albuterol/ipratropium for Nebulization 3 milliLiter(s) Nebulizer <User Schedule>  atorvastatin 40 milliGRAM(s) Oral at bedtime  buDESOnide    Inhalation Suspension 0.5 milliGRAM(s) Inhalation every 12 hours  cefTRIAXone   IVPB 1000 milliGRAM(s) IV Intermittent every 24 hours  cefTRIAXone   IVPB      finasteride 5 milliGRAM(s) Oral daily  gabapentin 300 milliGRAM(s) Oral every 8 hours  guaiFENesin ER 1200 milliGRAM(s) Oral every 12 hours  heparin  Infusion 1200 Unit(s)/Hr (16 mL/Hr) IV Continuous <Continuous>  insulin regular Infusion 3 Unit(s)/Hr (3 mL/Hr) IV Continuous <Continuous>  levothyroxine 25 MICROGram(s) Oral daily  methylPREDNISolone sodium succinate Injectable 20 milliGRAM(s) IV Push every 12 hours  metoprolol succinate ER 25 milliGRAM(s) Oral daily  montelukast 10 milliGRAM(s) Oral daily  multivitamin/minerals 1 Tablet(s) Oral daily  pantoprazole    Tablet 40 milliGRAM(s) Oral before breakfast  polyethylene glycol 3350 17 Gram(s) Oral daily  senna 2 Tablet(s) Oral at bedtime  tamsulosin 0.8 milliGRAM(s) Oral at bedtime        VITALS:  T(F): 96.8 (04-11-22 @ 11:00), Max: 97.5 (04-10-22 @ 15:00)  HR: 110 (04-11-22 @ 14:25)  BP: 184/81 (04-11-22 @ 12:00)  RR: 20 (04-11-22 @ 12:00)  SpO2: 99% (04-11-22 @ 14:25)  Wt(kg): --    04-10 @ 07:01  -  04-11 @ 07:00  --------------------------------------------------------  IN: 1153 mL / OUT: 3022 mL / NET: -1869 mL    04-11 @ 07:01  -  04-11 @ 14:32  --------------------------------------------------------  IN: 236 mL / OUT: 210 mL / NET: 26 mL      Physical Exam :-  Constitutional: NAD  Respiratory: Bilateral equal breath sounds, no Crackles present.  Cardiovascular: S1, S2 normal, positive Murmur  Gastrointestinal: Bowel Sounds present, soft, non tender.  Extremities: + Edema Feet left lower ext ; right bka- with wound vac  Neurological: Alert and Oriented x 3  Psychiatric: Normal mood, normal affect  LABS:  04-10    141  |  104  |  98<H>  ----------------------------<  155<H>  4.1   |  20<L>  |  1.73<H>    Ca    9.5      10 Apr 2022 23:21  Phos  3.3     04-10  Mg     2.3     04-10      Creatinine Trend: 1.73 <--, 1.82 <--, 2.24 <--, 2.44 <--, 2.28 <--, 2.25 <--, 1.89 <--, 1.68 <--, 1.56 <--                        8.9    15.11 )-----------( 285      ( 10 Apr 2022 23:21 )             29.0     Urine Studies:      RADIOLOGY & ADDITIONAL STUDIES:

## 2022-04-11 NOTE — PROGRESS NOTE ADULT - ASSESSMENT
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 Post-op course c/b severe COPD exacerbation requiring HFNC. SICU consulted for respiratory monitoring.     PLAN:   Neuro: post-op pain  - Pain control: Tylenol 975mg PO q6hr ATC, Gabapentin 300mg PO q8hr  - Multivitamin    Respiratory: COPD exacerbation   - 3L NC  - Monitor respiratory status  - Duonebs, mucinex, pulmicort, montelukast,    Cardiovascular: h/o HTN, HLD, AF  - Monitor vitals signs  - C/w home metoprolol 25mg PO daily for BP control -- metolazone, losartan HELD iso worsening KARLOS  - C/w home atorvastatin 40mg PO HS for HLD    Gastrointestinal: no active issues   - Diet: Regular  - Protonix 40mg PO qD  - Bowel regimen: Senna, Miralax    Genitourinary/Renal: h/o urinary retention  - C/w home flomax, finasteride  - Monitor UOP, Adair for strict I/O monitoring  - Trend electrolytes on BMP qD, replete PRN    Heme: no active issues   - Hep gtt for AF, monitor PTT qD  - ASA 81 HELD  - Trend H/H on CBC qD    ID: RLE wet gangrene s/p laura R ERIKA 4/4  - Abx: ceftriaxone  - Trend WBC on CBC qD  - Monitor fever curve    Endocrine: h/o DM, hypothyroidism  - Insulin gtt, wean as tolerated  - Solumedrol 20mg IV q8h for COPD exacerbation   - C/w home Synthroid 25mcg PO qD    Lines:   - PIV x 2  - Adair    Code Status: Full Code  Dispo: SICU

## 2022-04-11 NOTE — PROGRESS NOTE ADULT - ASSESSMENT
86y male with PMHx of HTN, HLD, DM2 and nonhealing wounds of RLE who is non-ambulatory at home now s/p right guillotine BELOW knee amputation. Postoperative course c/b severe COPD exacerbation requiring excalation of O2 supplementation with HFNC. SICU consulted for close respiratory monitoring in the setting of COPD exacerbation. Improved saturation and work of breathing, now on room air.     PLAN:            - Appreciate excellent SICU care        Vascular Surgery  [id number] 86y male with PMHx of HTN, HLD, DM2 and nonhealing wounds of RLE who is non-ambulatory at home now s/p right guillotine BELOW knee amputation. Postoperative course c/b severe COPD exacerbation requiring excalation of O2 supplementation with HFNC. SICU consulted for close respiratory monitoring in the setting of COPD exacerbation. Improved saturation and work of breathing, now on room air.     PLAN:  - Maintain wound vac, next vac change today  - Continue hep gtt, goal aPTT 59-99  - Continue IV abx: Rocephin  - Pain control  - Regular diet  - Glucose control  - Planning for completion AKA TBD  - Will speak to pulmonary about medical clearance for completion of BKA  - Appreciate excellent SICU care    Vascular Surgery  [id number]        - Appreciate excellent SICU care        Vascular Surgery  [id number] 86y male with PMHx of HTN, HLD, DM2 and nonhealing wounds of RLE who is non-ambulatory at home now s/p right guillotine BELOW knee amputation. Postoperative course c/b severe COPD exacerbation requiring excalation of O2 supplementation with HFNC. SICU consulted for close respiratory monitoring in the setting of COPD exacerbation. Improved saturation and work of breathing, now on room air.     PLAN:  - Maintain wound vac, next vac change today  - Continue hep gtt, goal aPTT 59-99  - Continue IV abx: Rocephin  - Pain control  - Regular diet  - Glucose control  - Planning for completion AKA TBD  - Will speak to pulmonary about medical clearance for completion of BKA  - Appreciate excellent SICU care    Vascular Surgery  [id number]

## 2022-04-11 NOTE — PROGRESS NOTE ADULT - ASSESSMENT
ASSESSMENT:    86 year old gentleman, former smoker, followed by Dr. Alicja Rob of our practice for asthma/COPD overlap  syndrome and obstructive sleep apnea being treated conservatively. He has no history of TOM colonization/infection as listed in the "past medical history". He has been stable from a pulmonary perspective and maintained on budesonide and duoneb once daily and if needed. He also has a history of HTN, HLD, DM, CKD, CVA, CHF (mild systolic dysfunction) and atrial fibrillation with sick sinus syndrome s/p pacemaker implantation. He has been followed for many months for chronic foot wounds and leg pain now with some purulence and gangrene. The pain is exacerbated when laying in bed and improves with tylenol and when hanging the legs off of the bed. He is no longer able to ambulate. 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 The patient has developed marked shortness of breath with severe hypoxemia currently requiring a nasal canula @ 5lpm to maintain saturation @ 90%. He has a cough with copious mucous in his chest which he is unable to expectorate. He has chest congestion and wheeze. No fevers, chills or sweats. No chest pain/pressure or palpitations. Called by the patient's family and asked to be involved with his pulmonary care.    acute hypoxic respiratory failure -> improved    1) severe COPD exacerbation  2) 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 limiting diaphragmatic excursion and respiratory muscle weakness decreasing chest wall expansion -> bibasilar atelectasis  3) no evidence of pulmonary edema or pneumonia on the most recent CXR    leukocytosis more likely related to systemic steroids than infection    CKD/KARLOS due to diuresis in the setting of euvolemia -> improved    PLAN/RECOMMENDATIONS:    oxygen supplementation to keep saturation greater than 92% to avoid limb ischemia - currently on a 1lpm nasal canula  no further diuresis  ceftriaxone x 5 days  solumedrol 20mg IVP q12h - please do not taper further - the patient is quite bronchospastic - glucose control on steroids  albuterol/atrovent nebs q4h holding 2am dose  pulmicort 0.5mg nebs q12h - give after duoneb - rinse mouth after use  mucinex 1200mg 2 times daily  singulair 10mg @ bedtime  acapella device/incentive spirometer  cardiac meds: lipitor/toprol XL  flomax/proscar  vascular surgery follow-up    respiratory status needs to be further optimized prior to surgery - i do not anticipate that the patient's bronchospasm will resolve quickly - i think that he should be discharged to rehab until his respiratory status improves in order to tolerate an AKA    heparin gtt    insulin gtt for hyperglycemioa    needs better pain control  GI prophylaxis - protonix  proscar/flomax  bowel regimen    Will follow with you. Plan of care discussed with the patient at bedside and with the vascular team.    Sarabjit Wright MD, [facility]  [phone]  Pulmonary Medicine     ASSESSMENT:    86 year old gentleman, former smoker, followed by Dr. Alicja Rob of our practice for asthma/COPD overlap  syndrome and obstructive sleep apnea being treated conservatively. He has no history of TOM colonization/infection as listed in the "past medical history". He has been stable from a pulmonary perspective and maintained on budesonide and duoneb once daily and if needed. He also has a history of HTN, HLD, DM, CKD, CVA, CHF (mild systolic dysfunction) and atrial fibrillation with sick sinus syndrome s/p pacemaker implantation. He has been followed for many months for chronic foot wounds and leg pain now with some purulence and gangrene. The pain is exacerbated when laying in bed and improves with tylenol and when hanging the legs off of the bed. He is no longer able to ambulate. 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 The patient has developed marked shortness of breath with severe hypoxemia currently requiring a nasal canula @ 5lpm to maintain saturation @ 90%. He has a cough with copious mucous in his chest which he is unable to expectorate. He has chest congestion and wheeze. No fevers, chills or sweats. No chest pain/pressure or palpitations. Called by the patient's family and asked to be involved with his pulmonary care.    acute hypoxic respiratory failure -> improved    1) severe COPD exacerbation  2) 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 limiting diaphragmatic excursion and respiratory muscle weakness decreasing chest wall expansion -> bibasilar atelectasis  3) no evidence of pulmonary edema or pneumonia on the most recent CXR    leukocytosis more likely related to systemic steroids than infection    CKD/KARLOS due to diuresis in the setting of euvolemia -> improved    PLAN/RECOMMENDATIONS:    oxygen supplementation to keep saturation greater than 92% to avoid limb ischemia - currently on a 1lpm nasal canula  no further diuresis  ceftriaxone x 5 days  solumedrol 20mg IVP q8h - please do not taper further - the patient is quite bronchospastic - glucose control on steroids  albuterol/atrovent nebs q4h holding 2am dose  pulmicort 0.5mg nebs q12h - give after duoneb - rinse mouth after use  mucinex 1200mg 2 times daily  singulair 10mg @ bedtime  acapella device/incentive spirometer  cardiac meds: lipitor/toprol XL  flomax/proscar  vascular surgery follow-up    respiratory status needs to be further optimized prior to surgery - i do not anticipate that the patient's bronchospasm will resolve quickly - i think that he should be discharged to rehab until his respiratory status improves in order to tolerate an AKA    heparin gtt    insulin gtt for hyperglycemioa    needs better pain control  GI prophylaxis - protonix  proscar/flomax  bowel regimen    Will follow with you. Plan of care discussed with the patient at bedside and with the vascular team.    Sarabjit Wright MD, [facility]  [phone]  Pulmonary Medicine

## 2022-04-11 NOTE — PROGRESS NOTE ADULT - ASSESSMENT
85 y/o M w/ uncontrolled Type 2 DM complicated by neuropathy and nephropathy with hyperglycemia exacerbated by steroids, HTN, HLD, hypothyroidism admitted for right LE AKA       Problem/Recommendation - 1:  ·  Problem: Uncontrolled type 2 diabetes mellitus with hyperglycemia.   ·  Recommendation: Diabetes Education and Nutrition Eval  Glucose and insulin requirements are extremely variable with high doses of steroids. Would continue with insulin gtt for now and assess insulin requirements once more stable. Tight glucose control especially important in setting of recent AKA.  Goal glucose 140-180  Outpt. endo follow-up  Outpt. optho, podiatry, nephrology  Plan to d/c on basal + orals including SGLT2 given CKD. Would avoid metformin and sulfonylurea.     Problem/Recommendation - 2:  ·  Problem: Essential hypertension.   ·  Recommendation: Goal BP <140/90, on metoprolol     Problem/Recommendation - 3:  ·  Problem: Hyperlipidemia.   ·  Recommendation: on statin.     Problem/Recommendation - 4:  ·  Problem: Hypothyroidism.   ·  Recommendation: continue levothyroxine. Repeat TFTs as outpatient. 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   ·  Recommendation: Diabetes Education and Nutrition Eval  Glucose and insulin requirements are extremely variable with high doses of steroids. Steroids getting tapered. Would continue with insulin gtt for now and assess insulin requirements once more stable. Tight glucose control especially important in setting of recent AKA.  Goal glucose 140-180  Outpt. endo follow-up  Outpt. optho, podiatry, nephrology  Plan to d/c on basal + orals including SGLT2 given CKD. Would avoid metformin and sulfonylurea.     Problem/Recommendation - 2:  ·  Problem: Essential hypertension.   ·  Recommendation: Goal BP <140/90, on metoprolol     Problem/Recommendation - 3:  ·  Problem: Hyperlipidemia.   ·  Recommendation: on statin.     Problem/Recommendation - 4:  ·  Problem: Hypothyroidism.   ·  Recommendation: continue levothyroxine. Repeat TFTs as outpatient.

## 2022-04-11 NOTE — PROGRESS NOTE ADULT - SUBJECTIVE AND OBJECTIVE BOX
Vascular Surgery Progress Note      SUBJECTIVE: Patient seen and examined at bedside with surgical team    OBJECTIVE:    Vital Signs Last 24 Hrs  T(C): 36.1 (11 Apr 2022 03:00), Max: 36.4 (10 Apr 2022 15:00)  T(F): 97 (11 Apr 2022 03:00), Max: 97.5 (10 Apr 2022 15:00)  HR: 71 (11 Apr 2022 04:00) (67 - 89)  BP: 135/60 (11 Apr 2022 04:00) (114/55 - 162/58)  BP(mean): 86 (11 Apr 2022 04:00) (79 - 100)  RR: 14 (11 Apr 2022 04:00) (14 - 32)  SpO2: 93% (11 Apr 2022 04:00) (88% - 98%)I&O's Detail    09 Apr 2022 07:01  -  10 Apr 2022 07:00  --------------------------------------------------------  IN:    Heparin: 384 mL    Insulin: 243.5 mL    IV PiggyBack: 50 mL    Oral Fluid: 840 mL    PRBCs (Packed Red Blood Cells): 300 mL  Total IN: 1817.5 mL    OUT:    Indwelling Catheter - Urethral (mL): 1410 mL  Total OUT: 1410 mL    Total NET: 407.5 mL      10 Apr 2022 07:01  -  11 Apr 2022 04:52  --------------------------------------------------------  IN:    Heparin: 336 mL    Insulin: 225 mL    IV PiggyBack: 50 mL    Oral Fluid: 480 mL  Total IN: 1091 mL    OUT:    Indwelling Catheter - Urethral (mL): 1205 mL    Intermittent Catheterization - Urethral (mL): 1575 mL    Stool (mL): 1 mL  Total OUT: 2781 mL    Total NET: -1690 mL      MEDICATIONS  (STANDING):  acetaminophen     Tablet .. 975 milliGRAM(s) Oral every 6 hours  albuterol/ipratropium for Nebulization 3 milliLiter(s) Nebulizer <User Schedule>  atorvastatin 40 milliGRAM(s) Oral at bedtime  buDESOnide    Inhalation Suspension 0.5 milliGRAM(s) Inhalation every 12 hours  cefTRIAXone   IVPB 1000 milliGRAM(s) IV Intermittent every 24 hours  cefTRIAXone   IVPB      finasteride 5 milliGRAM(s) Oral daily  gabapentin 300 milliGRAM(s) Oral every 8 hours  guaiFENesin ER 1200 milliGRAM(s) Oral every 12 hours  heparin  Infusion 1200 Unit(s)/Hr (16 mL/Hr) IV Continuous <Continuous>  insulin regular Infusion 3 Unit(s)/Hr (3 mL/Hr) IV Continuous <Continuous>  levothyroxine 25 MICROGram(s) Oral daily  methylPREDNISolone sodium succinate Injectable 20 milliGRAM(s) IV Push every 8 hours  metoprolol succinate ER 25 milliGRAM(s) Oral daily  montelukast 10 milliGRAM(s) Oral daily  multivitamin/minerals 1 Tablet(s) Oral daily  pantoprazole    Tablet 40 milliGRAM(s) Oral before breakfast  polyethylene glycol 3350 17 Gram(s) Oral daily  senna 2 Tablet(s) Oral at bedtime  tamsulosin 0.8 milliGRAM(s) Oral at bedtime    MEDICATIONS  (PRN):      PHYSICAL EXAM:  Constitutional: A&Ox3, NAD  Respiratory: Unlabored breathing  Abdomen:   Extremities:     LABS:                        8.9    15.11 )-----------( 285      ( 10 Apr 2022 23:21 )             29.0     04-10    141  |  104  |  98<H>  ----------------------------<  155<H>  4.1   |  20<L>  |  1.73<H>    Ca    9.5      10 Apr 2022 23:21  Phos  3.3     04-10  Mg     2.3     04-10      PT/INR - ( 10 Apr 2022 23:21 )   PT: 12.3 sec;   INR: 1.07 ratio         PTT - ( 10 Apr 2022 23:21 )  PTT:75.9 sec          IMAGING:     Vascular Surgery Progress Note    Interval/Overnight Events:     - Failed trial of void x3, hudson catheter reinserted  - No acute events overnight    SUBJECTIVE: Patient seen and examined at bedside. He is sitting up in his chair comfortably. He is tolerating a regular diet. His pain is controlled. He has no complaints this am. He denies any CP, SOB, numbness/tingling in b/l limbs, loss of sensation or motor function, n/v/d      OBJECTIVE:    Vital Signs Last 24 Hrs  T(C): 36.1 (11 Apr 2022 03:00), Max: 36.4 (10 Apr 2022 15:00)  T(F): 97 (11 Apr 2022 03:00), Max: 97.5 (10 Apr 2022 15:00)  HR: 71 (11 Apr 2022 04:00) (67 - 89)  BP: 135/60 (11 Apr 2022 04:00) (114/55 - 162/58)  BP(mean): 86 (11 Apr 2022 04:00) (79 - 100)  RR: 14 (11 Apr 2022 04:00) (14 - 32)  SpO2: 93% (11 Apr 2022 04:00) (88% - 98%)I&O's Detail    09 Apr 2022 07:01  -  10 Apr 2022 07:00  --------------------------------------------------------  IN:    Heparin: 384 mL    Insulin: 243.5 mL    IV PiggyBack: 50 mL    Oral Fluid: 840 mL    PRBCs (Packed Red Blood Cells): 300 mL  Total IN: 1817.5 mL    OUT:    Indwelling Catheter - Urethral (mL): 1410 mL  Total OUT: 1410 mL    Total NET: 407.5 mL      10 Apr 2022 07:01  -  11 Apr 2022 04:52  --------------------------------------------------------  IN:    Heparin: 336 mL    Insulin: 225 mL    IV PiggyBack: 50 mL    Oral Fluid: 480 mL  Total IN: 1091 mL    OUT:    Indwelling Catheter - Urethral (mL): 1205 mL    Intermittent Catheterization - Urethral (mL): 1575 mL    Stool (mL): 1 mL  Total OUT: 2781 mL    Total NET: -1690 mL      MEDICATIONS  (STANDING):  acetaminophen     Tablet .. 975 milliGRAM(s) Oral every 6 hours  albuterol/ipratropium for Nebulization 3 milliLiter(s) Nebulizer <User Schedule>  atorvastatin 40 milliGRAM(s) Oral at bedtime  buDESOnide    Inhalation Suspension 0.5 milliGRAM(s) Inhalation every 12 hours  cefTRIAXone   IVPB 1000 milliGRAM(s) IV Intermittent every 24 hours  cefTRIAXone   IVPB      finasteride 5 milliGRAM(s) Oral daily  gabapentin 300 milliGRAM(s) Oral every 8 hours  guaiFENesin ER 1200 milliGRAM(s) Oral every 12 hours  heparin  Infusion 1200 Unit(s)/Hr (16 mL/Hr) IV Continuous <Continuous>  insulin regular Infusion 3 Unit(s)/Hr (3 mL/Hr) IV Continuous <Continuous>  levothyroxine 25 MICROGram(s) Oral daily  methylPREDNISolone sodium succinate Injectable 20 milliGRAM(s) IV Push every 8 hours  metoprolol succinate ER 25 milliGRAM(s) Oral daily  montelukast 10 milliGRAM(s) Oral daily  multivitamin/minerals 1 Tablet(s) Oral daily  pantoprazole    Tablet 40 milliGRAM(s) Oral before breakfast  polyethylene glycol 3350 17 Gram(s) Oral daily  senna 2 Tablet(s) Oral at bedtime  tamsulosin 0.8 milliGRAM(s) Oral at bedtime    MEDICATIONS  (PRN):      Physical Exam:  Gen: NAD  CV: Regular rate  Resp: breathing comfortably on room air  Ext: RLE BKA stump with wound vac in place holding appropriate suction    LABS:                        8.9    15.11 )-----------( 285      ( 10 Apr 2022 23:21 )             29.0     04-10    141  |  104  |  98<H>  ----------------------------<  155<H>  4.1   |  20<L>  |  1.73<H>    Ca    9.5      10 Apr 2022 23:21  Phos  3.3     04-10  Mg     2.3     04-10      PT/INR - ( 10 Apr 2022 23:21 )   PT: 12.3 sec;   INR: 1.07 ratio         PTT - ( 10 Apr 2022 23:21 )  PTT:75.9 sec          IMAGING:

## 2022-04-11 NOTE — PROGRESS NOTE ADULT - ATTENDING COMMENTS
86M initially admitted to vascular service for R BKA. Admitted to SICU for post operative respiratory failure due to CODP exacerbation    NEURO: Mental status baseline. Pain controlled on Tylenol and gabapentin  RESP: Weaned off hIFLO, now satting well on 3L NC. CXR unremarkable. Will continue nebs    CVS: HD stable. On metoprolol for Afib   GI: Tolerating regular diet,   : KARLOS on CKD. Cr 1.7 (baseline 1.3). Adequate urine output, normal. -1.8L/24 hours   HEME: Hb 8.9, Heparin drip for Afib    ID: WBC 15 up from 11, Afebrile, on Ceftriaxone day 3/5 for COPD exacerbation   ENDO: History of DM exacerbated by steroids for COPD exacerbation. On insulin drip for hyperglycemia. Will wean steroids     Patient's respiratory status significantly improved. Main ICU requirement is insulin. Likely to improve as steroids are weakened. 86M initially admitted to vascular service for R BKA. Admitted to SICU for post operative respiratory failure due to CODP exacerbation    NEURO: Mental status baseline. Pain controlled on Tylenol and gabapentin  RESP: Weaned off HFNC, now satting well on 3L NC. CXR unremarkable. Will continue nebs    CVS: HD stable. On metoprolol for Afib   GI: Tolerating regular diet,   : KARLOS on CKD. Cr 1.7 (baseline 1.3). Adequate urine output, normal. -1.8L/24 hours   HEME: Hb 8.9, Heparin drip for Afib    ID: WBC 15 up from 11, Afebrile, on Ceftriaxone day 3/5 for COPD exacerbation   ENDO: History of DM exacerbated by steroids for COPD exacerbation. On insulin drip for hyperglycemia. Will wean steroids

## 2022-04-11 NOTE — PROGRESS NOTE ADULT - ASSESSMENT
86M with PMH of COPD (not on home o2), prior smoking history (40 pack years), HTN, HLD, Afib, CHF, T2DM, CVA, BPH, and PAD admitted for elective RLE AKA. Renal consulted for CKD Mx.    KARLOS on CKD 3,   baseline Cr ~1.7  serum k stable  rise likely 2/2 hemodynamic changes--> ATN  c/w hudson-- good urine output  today Serum Creatinine pending  cont monitor Serum Creatinine   plan to add diuretics once Serum Creatinine stable  off losartan   monitor BMP daily and u/o   dose all meds for eGFR  avoid NSAIDs/Nephrotoxics.    HTN, controlled. bp optimal   continue  BB. monitor bp    Rt LE nonhealing wound:  s/p amputation  follow up with vascular    Dr Knight  [phone]

## 2022-04-11 NOTE — PROGRESS NOTE ADULT - SUBJECTIVE AND OBJECTIVE BOX
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    INTERVAL HISTORY: transferred to the ICU after developing lethargy and confusion in the setting of a COPD exacerbation with acute hypoxic respiratory failure and hyperglycemia; now awake and alert; no shortness of breath or hypoxemia on a 1lpm nasal canula; decreasing cough but still with difficulty expectorating mucous; ongoing chest congestion and wheeze; no fevers, chills or sweats; no chest pain/pressure or palpitations; he is still having severe right lower extremity pain especially with the VAC change this morning; CXR is with a small left pleural effusion with bibasilar atelectasis - there is no evidence of pulmonary edema; insulin gtt for hyperglycemia; heparin gtt for PAD    REVIEW OF SYSTEMS:  Constitutional: As per interval history  HEENT: Within normal limits  CV: As per interval history  Resp: As per interval history  GI: Within normal limits   : KARLOS  Musculoskeletal: s/p right BKA  Skin: Within normal limits  Neurological: Within normal limits  Psychiatric: Within normal limits  Endocrine: hyperglycemia  Hematologic/Lymphatic: Within normal limits  Allergic/Immunologic: Within normal limits    MEDICATIONS:     Pulmonary "  albuterol/ipratropium for Nebulization 3 milliLiter(s) Nebulizer <User Schedule>  buDESOnide    Inhalation Suspension 0.5 milliGRAM(s) Inhalation every 12 hours  guaiFENesin ER 1200 milliGRAM(s) Oral every 12 hours  montelukast 10 milliGRAM(s) Oral daily    Anti-microbials:  cefTRIAXone   IVPB 1000 milliGRAM(s) IV Intermittent every 24 hours  cefTRIAXone   IVPB        Cardiovascular:  metoprolol succinate ER 25 milliGRAM(s) Oral daily  tamsulosin 0.8 milliGRAM(s) Oral at bedtime    Other:  acetaminophen     Tablet .. 975 milliGRAM(s) Oral every 6 hours  atorvastatin 40 milliGRAM(s) Oral at bedtime  finasteride 5 milliGRAM(s) Oral daily  gabapentin 300 milliGRAM(s) Oral every 8 hours  heparin  Infusion 1200 Unit(s)/Hr IV Continuous <Continuous>  insulin regular Infusion 3 Unit(s)/Hr IV Continuous <Continuous>  levothyroxine 25 MICROGram(s) Oral daily  methylPREDNISolone sodium succinate Injectable 20 milliGRAM(s) IV Push every 12 hours  multivitamin/minerals 1 Tablet(s) Oral daily  pantoprazole    Tablet 40 milliGRAM(s) Oral before breakfast  polyethylene glycol 3350 17 Gram(s) Oral daily  senna 2 Tablet(s) Oral at bedtime    MEDICATIONS  (PRN):  oxyCODONE    IR 5 milliGRAM(s) Oral every 4 hours PRN Moderate Pain (4 - 6)    OBJECTIVE:    POCT Blood Glucose.: 166 mg/dL (11 Apr 2022 14:09)  POCT Blood Glucose.: 131 mg/dL (11 Apr 2022 13:11)  POCT Blood Glucose.: 162 mg/dL (11 Apr 2022 12:02)  POCT Blood Glucose.: 175 mg/dL (11 Apr 2022 10:57)  POCT Blood Glucose.: 200 mg/dL (11 Apr 2022 10:04)  POCT Blood Glucose.: 206 mg/dL (11 Apr 2022 09:02)  POCT Blood Glucose.: 113 mg/dL (11 Apr 2022 08:30)  POCT Blood Glucose.: 118 mg/dL (11 Apr 2022 06:54)  POCT Blood Glucose.: 126 mg/dL (11 Apr 2022 05:59)  POCT Blood Glucose.: 112 mg/dL (11 Apr 2022 05:02)  POCT Blood Glucose.: 138 mg/dL (11 Apr 2022 04:07)  POCT Blood Glucose.: 147 mg/dL (11 Apr 2022 02:58)  POCT Blood Glucose.: 141 mg/dL (11 Apr 2022 01:57)  POCT Blood Glucose.: 120 mg/dL (11 Apr 2022 00:59)  POCT Blood Glucose.: 151 mg/dL (10 Apr 2022 23:54)  POCT Blood Glucose.: 136 mg/dL (10 Apr 2022 23:03)  POCT Blood Glucose.: 168 mg/dL (10 Apr 2022 22:02)  POCT Blood Glucose.: 178 mg/dL (10 Apr 2022 20:58)  POCT Blood Glucose.: 179 mg/dL (10 Apr 2022 19:57)  POCT Blood Glucose.: 159 mg/dL (10 Apr 2022 18:53)  POCT Blood Glucose.: 139 mg/dL (10 Apr 2022 17:54)  POCT Blood Glucose.: 154 mg/dL (10 Apr 2022 16:59)  POCT Blood Glucose.: 160 mg/dL (10 Apr 2022 16:06)  POCT Blood Glucose.: 193 mg/dL (10 Apr 2022 14:53)      PHYSICAL EXAM:       ICU Vital Signs Last 24 Hrs  T(C): 36 (11 Apr 2022 11:00), Max: 36.4 (10 Apr 2022 15:00)  T(F): 96.8 (11 Apr 2022 11:00), Max: 97.5 (10 Apr 2022 15:00)  HR: 80 (11 Apr 2022 12:00) (60 - 89)  BP: 184/81 (11 Apr 2022 12:00) (114/55 - 184/81)  BP(mean): 116 (11 Apr 2022 12:00) (79 - 116)  ABP: --  ABP(mean): --  RR: 20 (11 Apr 2022 12:00) (12 - 32)  SpO2: 96% (11 Apr 2022 12:00) (90% - 99%) on 1lpm     General: Awake. Alert. Cooperative. No distress. Appears stated age. Obese  HEENT: Atraumatic. Normocephalic. Anicteric. Normal oral mucosa. PERRL. EOMI.  Neck: Supple. Trachea midline. Thyroid without enlargement/tenderness/nodules. No carotid bruit. No JVD.	  Cardiovascular: Regular rate and rhythm. Distant S1 S2. No murmurs, rubs or gallops.  Respiratory: Respirations unlaboured. Bilateral rhonchi and wheeze. No curvature.  Abdomen: Soft. Non-tender. Non-distended. No organomegaly. No masses. Normal bowel sounds.  Extremities: Warm to touch. No clubbing or cyanosis. No pedal edema. Right BKA with VAC dressing  Pulses: Decreased peripheral pulses LLE  Skin: Venous stasis changes left lower extremity  Lymph Nodes: Cervical, supraclavicular and axillary nodes normal  Neurological: Motor and sensory examination equal and normal. A and O x 3  Psychiatry: Appropriate mood and affect.    LABS:                          8.9    15.11 )-----------( 285      ( 10 Apr 2022 23:21 )             29.0     CBC    WBC  15.11 <==, 11.11 <==, 11.13 <==, 13.12 <==, 17.02 <==, 10.58 <==, 11.16 <==    Hemoglobin  8.9 <<==, 8.6 <<==, 6.9 <<==, 7.1 <<==, 7.4 <<==, 7.7 <<==, 7.3 <<==    Hematocrit  29.0 <==, 29.0 <==, 23.5 <==, 24.0 <==, 25.3 <==, 26.4 <==, 25.2 <==    Platelets  285 <==, 255 <==, 243 <==, 252 <==, 240 <==, 231 <==, 242 <==      141  |  104  |  98<H>  ----------------------------<  155<H>    04-10  4.1   |  20<L>  |  1.73<H>      LYTES    sodium  141 <==, 141 <==, 139 <==, 140 <==, 141 <==, 138 <==, 141 <==    potassium   4.1 <==, 3.6 <==, 3.9 <==, 4.4 <==, 4.0 <==, 4.6 <==, 4.2 <==    chloride  104 <==, 103 <==, 101 <==, 102 <==, 102 <==, 99 <==, 102 <==    carbon dioxide  20 <==, 22 <==, 21 <==, 24 <==, 24 <==, 21 <==, 25 <==    =============================================================================================  RENAL FUNCTION:    Creatinine:   1.73  <<==, 1.82  <<==, 2.24  <<==, 2.44  <<==, 2.28  <<==, 2.25  <<==, 1.89  <<==    BUN:   98 <==, 103 <==, 98 <==, 88 <==, 78 <==, 71 <==, 60 <==    ============================================================================================    calcium   9.5 <==, 9.1 <==, 9.1 <==, 9.2 <==, 9.1 <==, 9.1 <==, 9.0 <==    phos   3.3 <==, 3.6 <==, 4.4 <==, 4.2 <==, 5.0 <==, 3.4 <==, 3.4 <==    mag   2.3 <==, 2.4 <==, 2.5 <==, 2.4 <==, 2.4 <==, 2.4 <==, 2.4 <==    ============================================================================================  PT/INR - ( 10 Apr 2022 23:21 )   PT: 12.3 sec;   INR: 1.07 ratio      PTT - ( 10 Apr 2022 23:21 )  PTT:75.9 sec    Venous Blood Gas:  04-10 @ 23:19  7.39/42/44/25/72.2  VBG Lactate: 2.5    ABG - ( 08 Apr 2022 06:20 )  pH: 7.47  /  pCO2: 38    /  pO2: 67    / HCO3: 28    / Base Excess: 3.8   /  SaO2: 94.3      ABG - ( 07 Apr 2022 23:24 )  pH: 7.43  /  pCO2: 36    /  pO2: 79    / HCO3: 24    / Base Excess: -0.1  /  SaO2: 97.2      ABG - ( 07 Apr 2022 18:53 )  pH: 7.44  /  pCO2: 39    /  pO2: 61    / HCO3: 26    / Base Excess: 2.2   /  SaO2: 92.4      < from: TTE with Doppler (w/Cont) (04.03.22 @ 15:07) >    Patient name: Martir Heart  YOB: 1935   Age: 86 (M)   MR#: [id number]  Study Date: 4/3/2022  Location: [address] [place], [place] [id number]: Angie Olivarez RDCS  Study quality: Technically difficult  Referring Physician: Anmol Quinn MD  BloodPressure: 115/70 mmHg  Height: 173 cm  Weight: 92 kg  BSA: 2.1 m2  ------------------------------------------------------------------------  PROCEDURE: Transthoracic echocardiogram with 2-D, M-Mode  and complete spectral and color flow Doppler. Verbal  consent was obtained for injection of  Ultrasonic Enhancing  Agent following a discussion of risks and benefits.  Following intravenous injection of Ultrasonic Enhancing  Agent, harmonic imaging was performed.  INDICATION: Cardiomyopathy, unspecified (I42.9)  ------------------------------------------------------------------------  Dimensions:    Normal Values:  LA:     3.1    2.0 - 4.0 cm  Ao:     3.5    2.0 - 3.8 cm  SEPTUM: 1.1    0.6 - 1.2 cm  PWT:    1.3    0.6 - 1.1 cm  LVIDd:  4.3    3.0- 5.6 cm  LVIDs:  3.2    1.8 - 4.0 cm  Derived variables:  LVMI: 90 g/m2  RWT: 0.60  Fractional short: 26 %  EF (Visual Estimate): NWV %  Doppler Peak Velocity (m/sec): MV=1.6 AoV=1.4  ------------------------------------------------------------------------  Observations:  Mitral Valve: Mitral valve not well visualized. Mitral  annular calcification. Peak mitral valve gradient equals 10  mm Hg, mean transmitral valve gradient equals 4 mm Hg,  consistent with mild mitral stenosis.  Aortic Valve/Aorta: Aortic valve not well visualized;  appears calcified. Peak transaortic valve gradient equals 8  mm Hg, mean transaortic valve gradient equals 3 mm Hg,  estimated aortic valve area equals 2 sqcm (by continuity  equation), aortic valve velocity time integral equals 22  cm, consistent with mild aortic stenosis. Peak left  ventricular outflow tract gradient equals 3 mm Hg, mean  gradient is equal to 1 mm Hg, LVOT velocity time integral  equals 12 cm.  Aortic Root: 3.5 cm.  Left Atrium: Normal left atrium.  Left Ventricle: Endocardial visualization enhanced with  intravenous injection of Ultrasonic Enhancing Agent  (Definity). Mild left ventricular systolic dysfunction. The  inferior wall, and the inferoseptum are hypokinetic.  Normal left ventricular internal dimensions and wall  thicknesses. Unable to evaluate diastology.  Right Heart: A device wire is noted in the right heart. The  right ventricle is not well visualized; grossly normal  right ventricular systolic function. Tricuspid valve not  well visualized. Pulmonic valve not well visualized,  probably normal.  Pericardium/Pleura: Normal pericardium with trace  pericardial effusion.  Hemodynamic: Estimated right atrial pressure is 8 mm Hg.  ------------------------------------------------------------------------  Conclusions:  1. Aortic valve not well visualized; appears calcified.  Peak transaortic valve gradient equals 8 mm Hg, mean  transaortic valve gradient equals 3 mm Hg, estimated aortic  valve area equals 2 sqcm (by continuity equation), aortic  valve velocity time integral equals 22 cm, consistent with  mild aortic stenosis.  2. Endocardial visualization enhanced with intravenous  injection of Ultrasonic Enhancing Agent (Definity). Mild  left ventricular systolic dysfunction. The inferior wall,  and the inferoseptum are hypokinetic.  3. The right ventricle is not well visualized; grossly  normal right ventricular systolic function.  ------------------------------------------------------------------------  Confirmed on  4/3/2022 - 17:12:14 by BRITTANY Giraldo  ------------------------------------------------------------------------  --------------------------------------------------------------------------------------------------------------  ---------------------------------------------------------------------------------------------------------------  MICROBIOLOGY:     COVID-19 PCR . (04.01.22 @ 18:23)   COVID-19 PCR: NotDete:     RADIOLOGY:  [x] Chest radiographs reviewed and interpreted by me    EXAM:  XR CHEST PORTABLE ROUTINE 1V                          PROCEDURE DATE:  04/10/2022      FINDINGS:  Left pacemaker with leads in the right atrium and ventricle.    The heart size is not accurately assessed on this projection.  Bilateral lower lung field patchy opacities, left greater than right.  There is no pneumothorax. Trace left pleural effusion.    IMPRESSION:  Bilateral patchy opacities, left greater than right, differential   includes atelectasis or infection.    JAYDON MONTEMAYOR MD; Resident Radiologist  This document has been electronically signed.  SATURNINO CHERRY MD; Attending Radiologist  This document has been electronically signed. Apr 10 2022  9:14AM  --------------------------------------------------------------------------------------------  EXAM:  XR CHEST PORTABLE URGENT 1V                          PROCEDURE DATE:  04/07/2022      FINDINGS:  Left chest wall dual-lead pacemaker. Rotated exam limits assessment for   lead tip.    Small left pleural effusion with adjacent opacity. No pneumothorax.    Cardiac size cannot accurately be assessed in this projection.  Aortic   calcifications.      IMPRESSION: Small left pleural effusion with adjacent atelectasis   obscuring evaluation of the underlying lung.    DRE SANCHEZ MD; Resident Radiology  This document has been electronically signed.  WAGNER LAM MD; Attending Radiologist  This document has been electronically signed. Apr 7 2022  5:54PM  ---------------------------------------------------------------------------------------------------------------  EXAM:  XR CHEST PORTABLE URGENT 1V                          PROCEDURE DATE:  04/05/2022      FINDINGS:  Left chest wall dual-lead pacemaker.  The heart is normal in size.  The lungs are clear.  There is no pneumothorax or pleural effusion.    IMPRESSION:  Clear lungs.    KENA CARRINGTON MD; Resident Radiologist  This document has been electronically signed.  SATURNINO CHERRY MD; Attending Radiologist  This document has been electronically signed. Apr 6 2022 11:40AM  ---------------------------------------------------------------------------------------------------------------  EXAM:  XR CHEST PORTABLE URGENT 1V                          PROCEDURE DATE:  04/02/2022      FINDINGS:    Appropriate course of dual-chamber pacemaker. Unremarkable   cardiomediastinal silhouette. Minimal left basilar atelectasis. No   pleural effusion or pneumothorax.      IMPRESSION:    Mild left basilar atelectasis.    JOSEPH GAMINO M.D., ATTENDING RADIOGIST  This document has been electronically signed. Apr  3 2022  9:00AM  ---------------------------------------------------------------------------------------------------------------

## 2022-04-11 NOTE — PROGRESS NOTE ADULT - ASSESSMENT
EKG -  A sense V paced PVC's  Echo - Mild LV dysfunction mild aortic stenosis    a/p     1) Preop clearance for AKA - pt has h/o moderate LV dysfunction as per echo 2017, no chest pains 2d echo now shows mild LV dysfunction  , 12 lead EKG ok,  PPM interrogated , s/p BKA , pt wheezing b/l f/u pulm recs now on steroids,  consider CT ctest non con     2) HTN - continue losartan and metoprolol     3) Chronic systolic CHF - euvolemic on exam hold metolazone     4) ?Atrial fib - coumadin on hold on IV heparin     5) KALROS : held losartan and metolazone renal function improving

## 2022-04-11 NOTE — PROGRESS NOTE ADULT - SUBJECTIVE AND OBJECTIVE BOX
Chief Complaint: Hyperglycemia    History: Eating.    MEDICATIONS  (STANDING):  acetaminophen     Tablet .. 975 milliGRAM(s) Oral every 6 hours  albuterol/ipratropium for Nebulization 3 milliLiter(s) Nebulizer <User Schedule>  atorvastatin 40 milliGRAM(s) Oral at bedtime  buDESOnide    Inhalation Suspension 0.5 milliGRAM(s) Inhalation every 12 hours  cefTRIAXone   IVPB 1000 milliGRAM(s) IV Intermittent every 24 hours  cefTRIAXone   IVPB      finasteride 5 milliGRAM(s) Oral daily  gabapentin 300 milliGRAM(s) Oral every 8 hours  guaiFENesin ER 1200 milliGRAM(s) Oral every 12 hours  heparin  Infusion 1200 Unit(s)/Hr (16 mL/Hr) IV Continuous <Continuous>  insulin regular Infusion 3 Unit(s)/Hr (3 mL/Hr) IV Continuous <Continuous>  levothyroxine 25 MICROGram(s) Oral daily  methylPREDNISolone sodium succinate Injectable 20 milliGRAM(s) IV Push every 8 hours  metoprolol succinate ER 25 milliGRAM(s) Oral daily  montelukast 10 milliGRAM(s) Oral daily  multivitamin/minerals 1 Tablet(s) Oral daily  pantoprazole    Tablet 40 milliGRAM(s) Oral before breakfast  polyethylene glycol 3350 17 Gram(s) Oral daily  senna 2 Tablet(s) Oral at bedtime  tamsulosin 0.8 milliGRAM(s) Oral at bedtime    MEDICATIONS  (PRN):  oxyCODONE    IR 5 milliGRAM(s) Oral every 4 hours PRN Moderate Pain (4 - 6)      PHYSICAL EXAM:  VITALS: T(C): 36.5 (04-11-22 @ 15:00)  T(F): 97.7 (04-11-22 @ 15:00), Max: 97.7 (04-11-22 @ 15:00)  HR: 74 (04-11-22 @ 15:00) (60 - 118)  BP: 145/65 (04-11-22 @ 15:00) (114/55 - 184/81)  RR:  (12 - 32)  SpO2:  (90% - 99%)  Wt(kg): --  General: Well-developed male, No acute distress, Speaking full sentences.   Eye:  Extraocular movements are intact, No proptosis or lid lag, No scleral icterus.   Respiratory:  Respirations are non-labored, Symmetric chest wall expansion.  Cardiovascular:  Normal rate, Regular rhythm, No edema.  Gastrointestinal:  Soft, Non-tender, severely distended.   Integumentary:  Warm, dry.    POCT Blood Glucose.: 259 mg/dL (04-11-22 @ 17:20)  POCT Blood Glucose.: 183 mg/dL (04-11-22 @ 16:20)  POCT Blood Glucose.: 181 mg/dL (04-11-22 @ 14:55)  POCT Blood Glucose.: 166 mg/dL (04-11-22 @ 14:09)  POCT Blood Glucose.: 131 mg/dL (04-11-22 @ 13:11)  POCT Blood Glucose.: 162 mg/dL (04-11-22 @ 12:02)  POCT Blood Glucose.: 175 mg/dL (04-11-22 @ 10:57)  POCT Blood Glucose.: 200 mg/dL (04-11-22 @ 10:04)  POCT Blood Glucose.: 206 mg/dL (04-11-22 @ 09:02)  POCT Blood Glucose.: 113 mg/dL (04-11-22 @ 08:30)  POCT Blood Glucose.: 118 mg/dL (04-11-22 @ 06:54)  POCT Blood Glucose.: 126 mg/dL (04-11-22 @ 05:59)  POCT Blood Glucose.: 112 mg/dL (04-11-22 @ 05:02)  POCT Blood Glucose.: 138 mg/dL (04-11-22 @ 04:07)  POCT Blood Glucose.: 147 mg/dL (04-11-22 @ 02:58)  POCT Blood Glucose.: 141 mg/dL (04-11-22 @ 01:57)  POCT Blood Glucose.: 120 mg/dL (04-11-22 @ 00:59)  POCT Blood Glucose.: 151 mg/dL (04-10-22 @ 23:54)  POCT Blood Glucose.: 136 mg/dL (04-10-22 @ 23:03)  POCT Blood Glucose.: 168 mg/dL (04-10-22 @ 22:02)  POCT Blood Glucose.: 178 mg/dL (04-10-22 @ 20:58)  POCT Blood Glucose.: 179 mg/dL (04-10-22 @ 19:57)  POCT Blood Glucose.: 159 mg/dL (04-10-22 @ 18:53)  POCT Blood Glucose.: 139 mg/dL (04-10-22 @ 17:54)  POCT Blood Glucose.: 154 mg/dL (04-10-22 @ 16:59)  POCT Blood Glucose.: 160 mg/dL (04-10-22 @ 16:06)  POCT Blood Glucose.: 193 mg/dL (04-10-22 @ 14:53)  POCT Blood Glucose.: 190 mg/dL (04-10-22 @ 13:17)  POCT Blood Glucose.: 190 mg/dL (04-10-22 @ 12:00)  POCT Blood Glucose.: 212 mg/dL (04-10-22 @ 11:08)  POCT Blood Glucose.: 237 mg/dL (04-10-22 @ 10:01)  POCT Blood Glucose.: 205 mg/dL (04-10-22 @ 08:58)  POCT Blood Glucose.: 134 mg/dL (04-10-22 @ 07:51)  POCT Blood Glucose.: 133 mg/dL (04-10-22 @ 06:51)  POCT Blood Glucose.: 150 mg/dL (04-10-22 @ 06:03)  POCT Blood Glucose.: 108 mg/dL (04-10-22 @ 05:08)  POCT Blood Glucose.: 127 mg/dL (04-10-22 @ 04:02)  POCT Blood Glucose.: 120 mg/dL (04-10-22 @ 02:58)  POCT Blood Glucose.: 152 mg/dL (04-10-22 @ 02:09)  POCT Blood Glucose.: 155 mg/dL (04-10-22 @ 01:06)  POCT Blood Glucose.: 154 mg/dL (04-10-22 @ 00:00)  POCT Blood Glucose.: 205 mg/dL (04-09-22 @ 22:57)  POCT Blood Glucose.: 213 mg/dL (04-09-22 @ 22:03)  POCT Blood Glucose.: 240 mg/dL (04-09-22 @ 20:56)  POCT Blood Glucose.: 251 mg/dL (04-09-22 @ 20:03)  POCT Blood Glucose.: 204 mg/dL (04-09-22 @ 18:53)  POCT Blood Glucose.: 165 mg/dL (04-09-22 @ 18:01)  POCT Blood Glucose.: 125 mg/dL (04-09-22 @ 17:11)  POCT Blood Glucose.: 153 mg/dL (04-09-22 @ 16:01)  POCT Blood Glucose.: 179 mg/dL (04-09-22 @ 15:07)  POCT Blood Glucose.: 202 mg/dL (04-09-22 @ 14:00)  POCT Blood Glucose.: 238 mg/dL (04-09-22 @ 13:00)  POCT Blood Glucose.: 254 mg/dL (04-09-22 @ 12:12)  POCT Blood Glucose.: 267 mg/dL (04-09-22 @ 11:19)  POCT Blood Glucose.: 197 mg/dL (04-09-22 @ 10:17)  POCT Blood Glucose.: 129 mg/dL (04-09-22 @ 09:03)  POCT Blood Glucose.: 147 mg/dL (04-09-22 @ 08:13)  POCT Blood Glucose.: 170 mg/dL (04-09-22 @ 06:51)  POCT Blood Glucose.: 185 mg/dL (04-09-22 @ 06:05)  POCT Blood Glucose.: 190 mg/dL (04-09-22 @ 05:00)  POCT Blood Glucose.: 235 mg/dL (04-09-22 @ 03:58)  POCT Blood Glucose.: 267 mg/dL (04-09-22 @ 03:01)  POCT Blood Glucose.: 299 mg/dL (04-09-22 @ 02:00)  POCT Blood Glucose.: 327 mg/dL (04-09-22 @ 01:06)  POCT Blood Glucose.: 309 mg/dL (04-09-22 @ 00:04)  POCT Blood Glucose.: 319 mg/dL (04-08-22 @ 22:58)  POCT Blood Glucose.: 371 mg/dL (04-08-22 @ 22:00)  POCT Blood Glucose.: 358 mg/dL (04-08-22 @ 21:02)  POCT Blood Glucose.: 349 mg/dL (04-08-22 @ 20:01)  POCT Blood Glucose.: 299 mg/dL (04-08-22 @ 18:56)  POCT Blood Glucose.: 247 mg/dL (04-08-22 @ 18:01)      04-10    141  |  104  |  98<H>  ----------------------------<  155<H>  4.1   |  20<L>  |  1.73<H>    EGFR if : x   EGFR if non : x     Ca    9.5      04-10  Mg     2.3     04-10  Phos  3.3     04-10            Thyroid Function Tests:

## 2022-04-11 NOTE — PROGRESS NOTE ADULT - SUBJECTIVE AND OBJECTIVE BOX
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 Post-op course c/b severe COPD exacerbation requiring HFNC. SICU consulted for respiratory monitoring.     Allergies:   No Known Allergies    MEDICATIONS:   --------------------------------------------------------------------------------------  Neurologic Medications  acetaminophen     Tablet .. 975 milliGRAM(s) Oral every 6 hours  gabapentin 300 milliGRAM(s) Oral every 8 hours    Respiratory Medications  albuterol/ipratropium for Nebulization 3 milliLiter(s) Nebulizer <User Schedule>  buDESOnide    Inhalation Suspension 0.5 milliGRAM(s) Inhalation every 12 hours  guaiFENesin ER 1200 milliGRAM(s) Oral every 12 hours  montelukast 10 milliGRAM(s) Oral daily    Cardiovascular Medications  metoprolol succinate ER 25 milliGRAM(s) Oral daily  tamsulosin 0.8 milliGRAM(s) Oral at bedtime    Gastrointestinal Medications  multivitamin/minerals 1 Tablet(s) Oral daily  pantoprazole    Tablet 40 milliGRAM(s) Oral before breakfast  polyethylene glycol 3350 17 Gram(s) Oral daily  senna 2 Tablet(s) Oral at bedtime    Genitourinary Medications    Hematologic/Oncologic Medications  heparin  Infusion 1200 Unit(s)/Hr IV Continuous <Continuous>    Antimicrobial/Immunologic Medications  cefTRIAXone   IVPB 1000 milliGRAM(s) IV Intermittent every 24 hours  cefTRIAXone   IVPB        Endocrine/Metabolic Medications  atorvastatin 40 milliGRAM(s) Oral at bedtime  finasteride 5 milliGRAM(s) Oral daily  insulin regular Infusion 3 Unit(s)/Hr IV Continuous <Continuous>  levothyroxine 25 MICROGram(s) Oral daily  methylPREDNISolone sodium succinate Injectable 20 milliGRAM(s) IV Push every 8 hours    Topical/Other Medications    --------------------------------------------------------------------------------------    VITAL SIGNS, INS/OUTS (last 24 hours):  --------------------------------------------------------------------------------------  T(C): 36.1 (04-11-22 @ 03:00), Max: 36.4 (04-10-22 @ 15:00)  HR: 71 (04-11-22 @ 04:00) (67 - 89)  BP: 135/60 (04-11-22 @ 04:00) (114/55 - 162/58)  RR: 14 (04-11-22 @ 04:00) (14 - 32)  SpO2: 93% (04-11-22 @ 04:00) (88% - 98%)    04-09-22 @ 07:01  -  04-10-22 @ 07:00  --------------------------------------------------------  IN: 1817.5 mL / OUT: 1410 mL / NET: 407.5 mL    04-10-22 @ 07:01  -  04-11-22 @ 05:17  --------------------------------------------------------  IN: 1091 mL / OUT: 2781 mL / NET: -1690 mL      --------------------------------------------------------------------------------------  EXAM  NEUROLOGY  Exam: Normal, NAD, alert, oriented x3, no focal deficits.    HEENT  Exam: Normocephalic, atraumatic, EOMI.     RESPIRATORY  Exam: Normal expansion/effort.  Mechanical Ventilation:     CARDIOVASCULAR  Exam: Regular rate and rhythm.       GI/NUTRITION  Exam: Abdomen soft, Non-tender, Non-distended.     VASCULAR  Exam: Extremities warm, pink, well-perfused.     MUSCULOSKELETAL  Exam: All extremities moving spontaneously without limitations.     SKIN  Exam: Good skin turgor, no skin breakdown.     LABS  --------------------------------------------------------------------------------------                        8.9    15.11 )-----------( 285      ( 10 Apr 2022 23:21 )             29.0   04-10    141  |  104  |  98<H>  ----------------------------<  155<H>  4.1   |  20<L>  |  1.73<H>    Ca    9.5      10 Apr 2022 23:21  Phos  3.3     04-10  Mg     2.3     04-10    PT/INR - ( 10 Apr 2022 23:21 )   PT: 12.3 sec;   INR: 1.07 ratio         PTT - ( 10 Apr 2022 23:21 )  PTT:75.9 sec  --------------------------------------------------------------------------------------

## 2022-04-12 NOTE — PROGRESS NOTE ADULT - SUBJECTIVE AND OBJECTIVE BOX
Patient seen and examined  no complaints    No Known Allergies    Hospital Medications:   MEDICATIONS  (STANDING):  acetaminophen     Tablet .. 975 milliGRAM(s) Oral every 6 hours  acetylcysteine 20%  Inhalation 4 milliLiter(s) Inhalation three times a day  albuterol/ipratropium for Nebulization 3 milliLiter(s) Nebulizer <User Schedule>  atorvastatin 40 milliGRAM(s) Oral at bedtime  buDESOnide    Inhalation Suspension 0.5 milliGRAM(s) Inhalation every 12 hours  finasteride 5 milliGRAM(s) Oral daily  gabapentin 300 milliGRAM(s) Oral every 8 hours  guaiFENesin ER 1200 milliGRAM(s) Oral every 12 hours  heparin  Infusion 1200 Unit(s)/Hr (16 mL/Hr) IV Continuous <Continuous>  insulin regular Infusion 3 Unit(s)/Hr (3 mL/Hr) IV Continuous <Continuous>  levothyroxine 25 MICROGram(s) Oral daily  methylPREDNISolone sodium succinate Injectable 20 milliGRAM(s) IV Push every 8 hours  metoprolol succinate ER 25 milliGRAM(s) Oral daily  montelukast 10 milliGRAM(s) Oral daily  multivitamin/minerals 1 Tablet(s) Oral daily  pantoprazole    Tablet 40 milliGRAM(s) Oral before breakfast  polyethylene glycol 3350 17 Gram(s) Oral daily  senna 2 Tablet(s) Oral at bedtime  sodium chloride 3%  Inhalation 4 milliLiter(s) Inhalation three times a day  tamsulosin 0.8 milliGRAM(s) Oral at bedtime      VITALS:  T(F): 96.8 (04-12-22 @ 07:00), Max: 98.4 (04-11-22 @ 19:00)  HR: 73 (04-12-22 @ 11:00)  BP: 149/65 (04-12-22 @ 11:00)  RR: 17 (04-12-22 @ 11:00)  SpO2: 96% (04-12-22 @ 11:00)  Wt(kg): --    04-11 @ 07:01  -  04-12 @ 07:00  --------------------------------------------------------  IN: 1124.5 mL / OUT: 1683 mL / NET: -558.5 mL    04-12 @ 07:01  -  04-12 @ 13:17  --------------------------------------------------------  IN: 286 mL / OUT: 200 mL / NET: 86 mL      Physical Exam :-  Constitutional: NAD  Respiratory: Bilateral equal breath sounds, no Crackles present.  Cardiovascular: S1, S2 normal, positive Murmur  Gastrointestinal: Bowel Sounds present, soft, non tender.  Extremities: + Edema Feet left lower ext ; right bka- with wound vac  Neurological: Alert and Oriented x 3  Psychiatric: Normal mood, normal affect    LABS:  04-11    139  |  103  |  102<H>  ----------------------------<  342<H>  4.7   |  21<L>  |  1.78<H>    Ca    9.1      11 Apr 2022 22:22  Phos  3.4     04-11  Mg     2.4     04-11      Creatinine Trend: 1.78 <--, 1.73 <--, 1.82 <--, 2.24 <--, 2.44 <--, 2.28 <--, 2.25 <--, 1.89 <--, 1.68 <--                        8.4    17.15 )-----------( 258      ( 11 Apr 2022 22:22 )             27.8     Urine Studies:      RADIOLOGY & ADDITIONAL STUDIES:

## 2022-04-12 NOTE — PROGRESS NOTE ADULT - ATTENDING COMMENTS
86M initially admitted to vascular service for R BKA. Admitted to SICU for post operative respiratory failure due to CODP exacerbation    NEURO: Mental status baseline. On Tylenol and gabapentin. Will add Oxy for better multimodal pain control   RESP: COPD exacerbation significantly improved. Weaned off HFNC, now satting well >88% on room air. CXR unremarkable. Will continue nebs    CVS: HD stable. On metoprolol 25Qd for Afib   GI: Tolerating regular diet,   : KARLOS on CKD. Cr lateral at 1.7 (baseline 1.3). Adequate urine output. NET -500cc/24hrs   HEME: Hb 8.9, On coumadin at home afib. Currently on Heparin drip. PTT therapeutic    ID: WBC 17 from 15, Afebrile, last dose of ceftriaxone for COPD today  ENDO: History of DM exacerbated by steroids for COPD exacerbation. On insulin drip for hyperglycemia. Will wean steroids. 86M initially admitted to vascular service for R BKA. Admitted to SICU for post operative respiratory failure due to CODP exacerbation    NEURO: Mental status baseline. On Tylenol and gabapentin. Will add Oxycodone for better multimodal pain control.  RESP: COPD exacerbation significantly improved. Weaned off HFNC, now satting well 90% on room air. CXR unremarkable. Will continue nebs.    CVS: On metoprolol 25Qd for Afib.  GI: Tolerating carb diet.  : KARLOS on CKD. Cr lateral at 1.7 (baseline 1.3). Adequate urine output. NET -500cc/24hrs   HEME: Hb 8.9, On coumadin at home for afib. Currently on Heparin drip. PTT therapeutic    ID: WBC 17 from 15, Afebrile, last dose of ceftriaxone for COPD today  ENDO: History of DM exacerbated by steroids for COPD exacerbation. On insulin drip for hyperglycemia, change to ISS. Will wean steroids.

## 2022-04-12 NOTE — PROGRESS NOTE ADULT - ASSESSMENT
ASSESSMENT:    86 year old gentleman, former smoker, followed by Dr. Alicja Rob of our practice for asthma/COPD overlap  syndrome and obstructive sleep apnea being treated conservatively. He has no history of TOM colonization/infection as listed in the "past medical history". He has been stable from a pulmonary perspective and maintained on budesonide and duoneb once daily and if needed. He also has a history of HTN, HLD, DM, CKD, CVA, CHF (mild systolic dysfunction) and atrial fibrillation with sick sinus syndrome s/p pacemaker implantation. He has been followed for many months for chronic foot wounds and leg pain now with some purulence and gangrene. The pain is exacerbated when laying in bed and improves with tylenol and when hanging the legs off of the bed. He is no longer able to ambulate. 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 The patient has developed marked shortness of breath with severe hypoxemia currently requiring a nasal canula @ 5lpm to maintain saturation @ 90%. He has a cough with copious mucous in his chest which he is unable to expectorate. He has chest congestion and wheeze. No fevers, chills or sweats. No chest pain/pressure or palpitations. Called by the patient's family and asked to be involved with his pulmonary care.    acute hypoxic respiratory failure -> improved    1) severe COPD exacerbation -> slowly improving  2) 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 limiting diaphragmatic excursion and respiratory muscle weakness decreasing chest wall expansion -> bibasilar atelectasis  3) no evidence of pulmonary edema or pneumonia on the most recent CXR    leukocytosis more likely related to systemic steroids than infection    CKD/KARLOS due to diuresis in the setting of euvolemia -> improved    PLAN/RECOMMENDATIONS:    oxygen supplementation to keep saturation greater than 92% to avoid limb ischemia - trial on room air  no further diuresis  ceftriaxone x 5 days  solumedrol 20mg IVP q8h - please do not taper further - the patient remains bronchospastic - glucose control on steroids  albuterol/atrovent nebs q4h holding 2am dose  pulmicort 0.5mg nebs q12h - give after duoneb - rinse mouth after use  add mucomyst and hypertonic saline nebs to facilitate mucous clearance  mucinex 1200mg 2 times daily  singulair 10mg @ bedtime  acapella device/incentive spirometer  respiratory status continues to slowly improve - his hypoxemia has resolved and bronchospasm continues to improve - while he needs further respiratory optimization prior to surgery i now believe he should be able to have the proposed AKA near the end of the week  cardiac meds: lipitor/toprol XL  flomax/proscar  vascular surgery follow-up    heparin gtt    insulin gtt for hyperglycemia - perhaps this should converted to SQ insulin    pain control  GI prophylaxis - protonix  proscar/flomax  bowel regimen  out of bed and into the chair    Will follow with you. Plan of care discussed with the patient at bedside and with the vascular team.    Sarabjit Wright MD, [facility]  [phone]  Pulmonary Medicine

## 2022-04-12 NOTE — PROGRESS NOTE ADULT - ASSESSMENT
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 Post-op course c/b severe COPD exacerbation requiring HFNC. SICU consulted for respiratory monitoring.     PLAN:   Neuro: post-op pain  - Pain control: Tylenol 975mg PO q6hr ATC, Oxycodone 5mg q4hr PRN, Gabapentin 300mg PO q8hr  - Multivitamin    Respiratory: COPD exacerbation   - 3L NC  - Monitor respiratory status  - Duonebs, mucinex, pulmicort, montelukast,    Cardiovascular: h/o HTN, HLD, AF  - Monitor vitals signs  - C/w home metoprolol 25mg PO daily for BP control -- metolazone, losartan HELD iso worsening KARLOS  - C/w home atorvastatin 40mg PO HS for HLD    Gastrointestinal: no active issues   - Diet: Regular CC  - Protonix 40mg PO qD  - Bowel regimen: Senna, Miralax    Genitourinary/Renal: h/o urinary retention  - C/w home flomax, finasteride  - Monitor UOP, Adair for strict I/O monitoring  - Trend electrolytes on BMP qD, replete PRN    Heme: no active issues   - Hep gtt for AF, monitor PTT qD  - ASA 81 HELD  - Trend H/H on CBC qD    ID: RLE wet gangrene s/p guilyunierine R BKA 4/4  - Abx: ceftriaxone 1g IV daily  - Trend WBC on CBC qD  - Monitor fever curve    Endocrine: h/o DM, hypothyroidism  - Insulin gtt, wean as tolerated  - Solumedrol 20mg IV q8h for COPD exacerbation   - C/w home Synthroid 25mcg PO qD    Lines:   - PIV x 2  - Adair    Code Status: Full Code  Dispo: SICU

## 2022-04-12 NOTE — PROGRESS NOTE ADULT - SUBJECTIVE AND OBJECTIVE BOX
SICU Daily Progress Note  =====================================================  Interval/Overnight Events:     - C/w insulin gtt requirements  - No acute events overnight    HPI: 86y M with Hx DM, HTN, COPD, and HLD who presented with RLE pain (s/p angio 9/2021), found to have wet gangrene s/p R laura JAMES 4/4. Post-op course c/b severe COPD exacerbation requiring HFNC. SICU consulted for respiratory monitoring.     Allergies:   No Known Allergies    MEDICATIONS:   --------------------------------------------------------------------------------------  Neurologic Medications  acetaminophen     Tablet .. 975 milliGRAM(s) Oral every 6 hours  gabapentin 300 milliGRAM(s) Oral every 8 hours  oxyCODONE    IR 5 milliGRAM(s) Oral every 4 hours PRN Moderate Pain (4 - 6)    Respiratory Medications  albuterol/ipratropium for Nebulization 3 milliLiter(s) Nebulizer <User Schedule>  buDESOnide    Inhalation Suspension 0.5 milliGRAM(s) Inhalation every 12 hours  guaiFENesin ER 1200 milliGRAM(s) Oral every 12 hours  montelukast 10 milliGRAM(s) Oral daily    Cardiovascular Medications  metoprolol succinate ER 25 milliGRAM(s) Oral daily  tamsulosin 0.8 milliGRAM(s) Oral at bedtime    Gastrointestinal Medications  multivitamin/minerals 1 Tablet(s) Oral daily  pantoprazole    Tablet 40 milliGRAM(s) Oral before breakfast  polyethylene glycol 3350 17 Gram(s) Oral daily  senna 2 Tablet(s) Oral at bedtime    Genitourinary Medications    Hematologic/Oncologic Medications  heparin  Infusion 1200 Unit(s)/Hr IV Continuous <Continuous>    Antimicrobial/Immunologic Medications  cefTRIAXone   IVPB 1000 milliGRAM(s) IV Intermittent every 24 hours  cefTRIAXone   IVPB        Endocrine/Metabolic Medications  atorvastatin 40 milliGRAM(s) Oral at bedtime  finasteride 5 milliGRAM(s) Oral daily  insulin regular Infusion 3 Unit(s)/Hr IV Continuous <Continuous>  levothyroxine 25 MICROGram(s) Oral daily  methylPREDNISolone sodium succinate Injectable 20 milliGRAM(s) IV Push every 8 hours    Topical/Other Medications    --------------------------------------------------------------------------------------  VITAL SIGNS, INS/OUTS (last 24 hours):  --------------------------------------------------------------------------------------  T(C): 36.7 (04-12-22 @ 03:00), Max: 36.9 (04-11-22 @ 19:00)  HR: 80 (04-12-22 @ 04:00) (60 - 119)  BP: 156/68 (04-12-22 @ 04:00) (119/58 - 184/81)  RR: 17 (04-12-22 @ 04:00) (12 - 26)  SpO2: 95% (04-12-22 @ 04:00) (90% - 99%)    04-10-22 @ 07:01  -  04-11-22 @ 07:00  --------------------------------------------------------  IN: 1153 mL / OUT: 3022 mL / NET: -1869 mL    04-11-22 @ 07:01  -  04-12-22 @ 04:27  --------------------------------------------------------  IN: 638 mL / OUT: 1477 mL / NET: -839 mL      --------------------------------------------------------------------------------------  EXAM  NEUROLOGY  Exam: Normal, NAD, alert, oriented x3, no focal deficits.    HEENT  Exam: Normocephalic, atraumatic, EOMI.     RESPIRATORY  Exam: Normal expansion/effort.  Mechanical Ventilation:     CARDIOVASCULAR  Exam: Regular rate and rhythm.       GI/NUTRITION  Exam: Abdomen soft, Non-tender, Non-distended.     VASCULAR  Exam: Extremities warm, pink, well-perfused.     MUSCULOSKELETAL  Exam: All extremities moving spontaneously without limitations.     SKIN  Exam: Good skin turgor, no skin breakdown.     LABS  --------------------------------------------------------------------------------------                        8.4    17.15 )-----------( 258      ( 11 Apr 2022 22:22 )             27.8   04-11    139  |  103  |  102<H>  ----------------------------<  342<H>  4.7   |  21<L>  |  1.78<H>    Ca    9.1      11 Apr 2022 22:22  Phos  3.4     04-11  Mg     2.4     04-11    PT/INR - ( 11 Apr 2022 22:22 )   PT: 12.2 sec;   INR: 1.05 ratio         PTT - ( 11 Apr 2022 22:22 )  PTT:63.7 sec  --------------------------------------------------------------------------------------

## 2022-04-12 NOTE — PROGRESS NOTE ADULT - SUBJECTIVE AND OBJECTIVE BOX
Ritesh Bell MD  Interventional Cardiology / Advance Heart Failure and Cardiac Transplant Specialist  [place] Office : [phone] [place] [address] [place]. [address]  Tel:   [place] Office : [address] [facility] [place] [address]  Tel: [phone]      Subjective/Overnight events: Pt is sitting up in chair. denies CP. SOB improving   	  MEDICATIONS:  heparin  Infusion 1200 Unit(s)/Hr IV Continuous <Continuous>  metoprolol succinate ER 25 milliGRAM(s) Oral daily  tamsulosin 0.8 milliGRAM(s) Oral at bedtime      acetylcysteine 20%  Inhalation 4 milliLiter(s) Inhalation three times a day  albuterol/ipratropium for Nebulization 3 milliLiter(s) Nebulizer <User Schedule>  buDESOnide    Inhalation Suspension 0.5 milliGRAM(s) Inhalation every 12 hours  guaiFENesin ER 1200 milliGRAM(s) Oral every 12 hours  montelukast 10 milliGRAM(s) Oral daily  sodium chloride 3%  Inhalation 4 milliLiter(s) Inhalation three times a day    acetaminophen     Tablet .. 975 milliGRAM(s) Oral every 6 hours  gabapentin 300 milliGRAM(s) Oral every 8 hours  oxyCODONE    IR 5 milliGRAM(s) Oral every 4 hours PRN    pantoprazole    Tablet 40 milliGRAM(s) Oral before breakfast  polyethylene glycol 3350 17 Gram(s) Oral daily  senna 2 Tablet(s) Oral at bedtime    atorvastatin 40 milliGRAM(s) Oral at bedtime  finasteride 5 milliGRAM(s) Oral daily  insulin regular Infusion 3 Unit(s)/Hr IV Continuous <Continuous>  levothyroxine 25 MICROGram(s) Oral daily  methylPREDNISolone sodium succinate Injectable 20 milliGRAM(s) IV Push every 8 hours    multivitamin/minerals 1 Tablet(s) Oral daily      PAST MEDICAL/SURGICAL HISTORY  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    Deep Vein Thrombosis (DVT)  17 yeras ago on Coumadin    CHF (congestive heart failure)  last exacerbation in 1/2017    Enlarged prostate    GERD (gastroesophageal reflux disease)    Hernia  umblical    Calculus of bile duct without cholangitis or cholecystitis without obstruction    Atrial fibrillation    S/P Hernia Repair    S/P cataract surgery, unspecified laterality    S/P ERCP  3/2017        SOCIAL HISTORY: Substance Use (street drugs): ( x ) never used  (  ) other:    FAMILY HISTORY:          PHYSICAL EXAM:  T(C): 36 (04-12-22 @ 07:00), Max: 36.9 (04-11-22 @ 19:00)  HR: 86 (04-12-22 @ 09:00) (67 - 119)  BP: 151/68 (04-12-22 @ 09:00) (119/58 - 184/81)  RR: 26 (04-12-22 @ 09:00) (12 - 26)  SpO2: 92% (04-12-22 @ 09:00) (89% - 99%)  Wt(kg): --  I&O's Summary    11 Apr 2022 07:01  -  12 Apr 2022 07:00  --------------------------------------------------------  IN: 1124.5 mL / OUT: 1683 mL / NET: -558.5 mL    12 Apr 2022 07:01  -  12 Apr 2022 10:31  --------------------------------------------------------  IN: 20.5 mL / OUT: 50 mL / NET: -29.5 mL         EYES:   PERRLA   ENMT:   Moist mucous membranes, Good dentition, No lesions  Cardiovascular: Normal S1 S2, No JVD, No murmurs, No edema  Respiratory: b/l rhonchi   Gastrointestinal:  Soft, Non-tender, + BS	  Extremities: s/p BKA                                  8.4    17.15 )-----------( 258      ( 11 Apr 2022 22:22 )             27.8     04-11    139  |  103  |  102<H>  ----------------------------<  342<H>  4.7   |  21<L>  |  1.78<H>    Ca    9.1      11 Apr 2022 22:22  Phos  3.4     04-11  Mg     2.4     04-11      proBNP:   Lipid Profile:   HgA1c:   TSH:     Consultant(s) Notes Reviewed:  [x ] YES  [ ] NO    Care Discussed with Consultants/Other Providers [ x] YES  [ ] NO    Imaging Personally Reviewed independently:  [x] YES  [ ] NO    All labs, radiologic studies, vitals, orders and medications list reviewed. Patient is seen and examined at bedside. Case discussed with medical team.

## 2022-04-12 NOTE — PROGRESS NOTE ADULT - ASSESSMENT
86M with PMH of COPD (not on home o2), prior smoking history (40 pack years), HTN, HLD, Afib, CHF, T2DM, CVA, BPH, and PAD admitted for elective RLE AKA. Renal consulted for CKD Mx.    KARLOS on CKD 3,   baseline Cr ~1.7  serum k stable  rise likely 2/2 hemodynamic changes--> ATN  c/w hudson-- good urine output  today Serum Creatinine pending  cont monitor Serum Creatinine   plan to add diuretics once Serum Creatinine stable  off losartan   monitor BMP daily and u/o   dose all meds for eGFR  avoid NSAIDs/Nephrotoxics.    HTN, controlled. bp optimal   continue  BB. monitor bp    Rt LE nonhealing wound:  s/p amputation  follow up with vascular    Dr Knight  [phone]

## 2022-04-12 NOTE — PROGRESS NOTE ADULT - ASSESSMENT
86 year old gentleman with a PMH DM, HTN, HLD who has been followed for the past 6 months for foot wounds and leg pain.      EKG -  A sense V paced PVC's  Echo - Mild LV dysfunction mild aortic stenosis    1) Post op assessment   -pt has h/o moderate LV dysfunction as per echo 2017, no chest pains 2d echo now shows mild LV dysfunction  -12 lead EKG ok,  PPM interrogated  -s/p BKA , pt wheezing b/l f/u pulm recs now on steroids,  consider CT chest non con     2) HTN  -controlled  -c/w metoprolol  -continue to monitor BP    3) Chronic systolic CHF   - euvolemic on exam  - hold metolazone     4) ?Atrial fib   - coumadin on hold on IV heparin     5) KARLOS  - held losartan and metolazone  - renal function improving   -f/u labs today

## 2022-04-12 NOTE — PROGRESS NOTE ADULT - SUBJECTIVE AND OBJECTIVE BOX
Vascular Surgery Progress Note    INTERVAL EVENTS:   No acute events overnight.    SUBJECTIVE: Patient seen and examined at bedside with surgical team    OBJECTIVE:    Vital Signs Last 24 Hrs  T(C): 36.4 (11 Apr 2022 23:00), Max: 36.9 (11 Apr 2022 19:00)  T(F): 97.6 (11 Apr 2022 23:00), Max: 98.4 (11 Apr 2022 19:00)  HR: 85 (12 Apr 2022 00:00) (60 - 119)  BP: 143/66 (12 Apr 2022 00:00) (119/58 - 184/81)  BP(mean): 95 (12 Apr 2022 00:00) (80 - 116)  RR: 23 (12 Apr 2022 00:00) (12 - 26)  SpO2: 92% (12 Apr 2022 00:00) (90% - 99%)I&O's Detail    10 Apr 2022 07:01  -  11 Apr 2022 07:00  --------------------------------------------------------  IN:    Heparin: 384 mL    Insulin: 239 mL    IV PiggyBack: 50 mL    Oral Fluid: 480 mL  Total IN: 1153 mL    OUT:    Indwelling Catheter - Urethral (mL): 1445 mL    Intermittent Catheterization - Urethral (mL): 1575 mL    Stool (mL): 2 mL  Total OUT: 3022 mL    Total NET: -1869 mL      11 Apr 2022 07:01  -  12 Apr 2022 00:32  --------------------------------------------------------  IN:    Heparin: 272 mL    Insulin: 125.5 mL    IV PiggyBack: 50 mL    Oral Fluid: 100 mL  Total IN: 547.5 mL    OUT:    Indwelling Catheter - Urethral (mL): 1100 mL    Stool (mL): 2 mL  Total OUT: 1102 mL    Total NET: -554.5 mL      MEDICATIONS  (STANDING):  acetaminophen     Tablet .. 975 milliGRAM(s) Oral every 6 hours  albuterol/ipratropium for Nebulization 3 milliLiter(s) Nebulizer <User Schedule>  atorvastatin 40 milliGRAM(s) Oral at bedtime  buDESOnide    Inhalation Suspension 0.5 milliGRAM(s) Inhalation every 12 hours  cefTRIAXone   IVPB 1000 milliGRAM(s) IV Intermittent every 24 hours  cefTRIAXone   IVPB      finasteride 5 milliGRAM(s) Oral daily  gabapentin 300 milliGRAM(s) Oral every 8 hours  guaiFENesin ER 1200 milliGRAM(s) Oral every 12 hours  heparin  Infusion 1200 Unit(s)/Hr (16 mL/Hr) IV Continuous <Continuous>  insulin regular Infusion 3 Unit(s)/Hr (3 mL/Hr) IV Continuous <Continuous>  levothyroxine 25 MICROGram(s) Oral daily  methylPREDNISolone sodium succinate Injectable 20 milliGRAM(s) IV Push every 8 hours  metoprolol succinate ER 25 milliGRAM(s) Oral daily  montelukast 10 milliGRAM(s) Oral daily  multivitamin/minerals 1 Tablet(s) Oral daily  pantoprazole    Tablet 40 milliGRAM(s) Oral before breakfast  polyethylene glycol 3350 17 Gram(s) Oral daily  senna 2 Tablet(s) Oral at bedtime  tamsulosin 0.8 milliGRAM(s) Oral at bedtime    MEDICATIONS  (PRN):  oxyCODONE    IR 5 milliGRAM(s) Oral every 4 hours PRN Moderate Pain (4 - 6)      PHYSICAL EXAM:  Constitutional: A&Ox3, NAD  Respiratory:  Abdomen:  Extremities:     LABS:                        8.4    17.15 )-----------( 258      ( 11 Apr 2022 22:22 )             27.8     04-11    139  |  103  |  102<H>  ----------------------------<  342<H>  4.7   |  21<L>  |  1.78<H>    Ca    9.1      11 Apr 2022 22:22  Phos  3.4     04-11  Mg     2.4     04-11      PT/INR - ( 11 Apr 2022 22:22 )   PT: 12.2 sec;   INR: 1.05 ratio         PTT - ( 11 Apr 2022 22:22 )  PTT:63.7 sec          IMAGING:     Vascular Surgery Progress Note    Interval/Overnight Events:     - C/w insulin gtt requirements  - No acute events overnight    SUBJECTIVE: Patient seen and examined at bedside. He was sitting up in his chair comfortably He is off o2 and is breathing comfortably on RA. He has no complaints. His pain is controlled. He denies any CP, SOB, numbness/tingling in b/l limbs, loss of sensation or motor function, n/v/d      OBJECTIVE:    Vital Signs Last 24 Hrs  T(C): 36.4 (11 Apr 2022 23:00), Max: 36.9 (11 Apr 2022 19:00)  T(F): 97.6 (11 Apr 2022 23:00), Max: 98.4 (11 Apr 2022 19:00)  HR: 85 (12 Apr 2022 00:00) (60 - 119)  BP: 143/66 (12 Apr 2022 00:00) (119/58 - 184/81)  BP(mean): 95 (12 Apr 2022 00:00) (80 - 116)  RR: 23 (12 Apr 2022 00:00) (12 - 26)  SpO2: 92% (12 Apr 2022 00:00) (90% - 99%)I&O's Detail    10 Apr 2022 07:01  -  11 Apr 2022 07:00  --------------------------------------------------------  IN:    Heparin: 384 mL    Insulin: 239 mL    IV PiggyBack: 50 mL    Oral Fluid: 480 mL  Total IN: 1153 mL    OUT:    Indwelling Catheter - Urethral (mL): 1445 mL    Intermittent Catheterization - Urethral (mL): 1575 mL    Stool (mL): 2 mL  Total OUT: 3022 mL    Total NET: -1869 mL      11 Apr 2022 07:01  -  12 Apr 2022 00:32  --------------------------------------------------------  IN:    Heparin: 272 mL    Insulin: 125.5 mL    IV PiggyBack: 50 mL    Oral Fluid: 100 mL  Total IN: 547.5 mL    OUT:    Indwelling Catheter - Urethral (mL): 1100 mL    Stool (mL): 2 mL  Total OUT: 1102 mL    Total NET: -554.5 mL      MEDICATIONS  (STANDING):  acetaminophen     Tablet .. 975 milliGRAM(s) Oral every 6 hours  albuterol/ipratropium for Nebulization 3 milliLiter(s) Nebulizer <User Schedule>  atorvastatin 40 milliGRAM(s) Oral at bedtime  buDESOnide    Inhalation Suspension 0.5 milliGRAM(s) Inhalation every 12 hours  cefTRIAXone   IVPB 1000 milliGRAM(s) IV Intermittent every 24 hours  cefTRIAXone   IVPB      finasteride 5 milliGRAM(s) Oral daily  gabapentin 300 milliGRAM(s) Oral every 8 hours  guaiFENesin ER 1200 milliGRAM(s) Oral every 12 hours  heparin  Infusion 1200 Unit(s)/Hr (16 mL/Hr) IV Continuous <Continuous>  insulin regular Infusion 3 Unit(s)/Hr (3 mL/Hr) IV Continuous <Continuous>  levothyroxine 25 MICROGram(s) Oral daily  methylPREDNISolone sodium succinate Injectable 20 milliGRAM(s) IV Push every 8 hours  metoprolol succinate ER 25 milliGRAM(s) Oral daily  montelukast 10 milliGRAM(s) Oral daily  multivitamin/minerals 1 Tablet(s) Oral daily  pantoprazole    Tablet 40 milliGRAM(s) Oral before breakfast  polyethylene glycol 3350 17 Gram(s) Oral daily  senna 2 Tablet(s) Oral at bedtime  tamsulosin 0.8 milliGRAM(s) Oral at bedtime    MEDICATIONS  (PRN):  oxyCODONE    IR 5 milliGRAM(s) Oral every 4 hours PRN Moderate Pain (4 - 6)      PHYSICAL EXAM:  Gen: NAD  CV: Regular rate  Resp: breathing comfortably on room air  Ext: RLE BKA stump with wound vac in place holding appropriate suction    LABS:                        8.4    17.15 )-----------( 258      ( 11 Apr 2022 22:22 )             27.8     04-11    139  |  103  |  102<H>  ----------------------------<  342<H>  4.7   |  21<L>  |  1.78<H>    Ca    9.1      11 Apr 2022 22:22  Phos  3.4     04-11  Mg     2.4     04-11      PT/INR - ( 11 Apr 2022 22:22 )   PT: 12.2 sec;   INR: 1.05 ratio         PTT - ( 11 Apr 2022 22:22 )  PTT:63.7 sec          IMAGING:

## 2022-04-12 NOTE — PROGRESS NOTE ADULT - ASSESSMENT
86y male with PMHx of HTN, HLD, DM2 and nonhealing wounds of RLE who is non-ambulatory at home now s/p right guillotine BELOW knee amputation. Postoperative course c/b severe COPD exacerbation requiring excalation of O2 supplementation with HFNC. SICU consulted for close respiratory monitoring in the setting of COPD exacerbation. Improved saturation and work of breathing, now on room air.     PLAN:    - Continue hep gtt, goal aPTT 59-99  - Continue IV abx: Rocephin  - Pain control  - Regular diet  - Glucose control  - Planning for completion AKA TBD  - Appreciate excellent SICU care    Vascular Surgery  [id number]       86y male with PMHx of HTN, HLD, DM2 and nonhealing wounds of RLE who is non-ambulatory at home now s/p right guillotine BELOW knee amputation. Postoperative course c/b severe COPD exacerbation requiring excalation of O2 supplementation with HFNC. SICU consulted for close respiratory monitoring in the setting of COPD exacerbation. Improved saturation and work of breathing, now on room air.     PLAN:  - Continue hep gtt, goal aPTT 59-99  - Continue IV abx: Rocephin  - Pain control  - Regular diet  - Glucose control  - No plan for AKA on this admission, pt will need to be optimized from a pulmonary standpoint before formalization   -Wean off steriods  -Wean off insulin gtt  - Pt can go to the floor once off insulin gtt   - Appreciate excellent SICU care    Vascular Surgery  [id number]

## 2022-04-12 NOTE — PROGRESS NOTE ADULT - SUBJECTIVE AND OBJECTIVE BOX
[facility] - PROGRESS NOTE    CHIEF COMPLAINT: acute hypoxic respiratory failure; COPD exacerbation; mucous plugging; atelectasis; weak cough; pleural effusion; right foot gangrene s/p BKA and awaiting AKA    INTERVAL HISTORY: transferred to the ICU after developing lethargy and confusion in the setting of a COPD exacerbation with acute hypoxic respiratory failure and hyperglycemia; not yet transferred back to the floor due to ongoing use of a insulin infusion; awake and alert sitting in the chair eating breakfast and suctioning secretions out of his mouth; no shortness of breath or hypoxemia on room air; decreasing cough but still with difficulty expectorating mucous; some improvement in chest congestion and wheeze; no fevers, chills or sweats; no chest pain/pressure or palpitations; he is still having severe right lower extremity pain; CXR is with a small left pleural effusion with bibasilar atelectasis - there is no evidence of pulmonary edema; heparin gtt for PAD    REVIEW OF SYSTEMS:  Constitutional: As per interval history  HEENT: Within normal limits  CV: As per interval history  Resp: As per interval history  GI: Within normal limits   : KARLOS ->resolved  Musculoskeletal: s/p right BKA  Skin: Within normal limits  Neurological: Within normal limits  Psychiatric: Within normal limits  Endocrine: hyperglycemia  Hematologic/Lymphatic: Within normal limits  Allergic/Immunologic: Within normal limits    MEDICATIONS:     Pulmonary "  albuterol/ipratropium for Nebulization 3 milliLiter(s) Nebulizer <User Schedule>  buDESOnide    Inhalation Suspension 0.5 milliGRAM(s) Inhalation every 12 hours  guaiFENesin ER 1200 milliGRAM(s) Oral every 12 hours  montelukast 10 milliGRAM(s) Oral daily    Anti-microbials:  cefTRIAXone   IVPB 1000 milliGRAM(s) IV Intermittent every 24 hours  cefTRIAXone   IVPB        Cardiovascular:  metoprolol succinate ER 25 milliGRAM(s) Oral daily  tamsulosin 0.8 milliGRAM(s) Oral at bedtime    Other:  acetaminophen     Tablet .. 975 milliGRAM(s) Oral every 6 hours  atorvastatin 40 milliGRAM(s) Oral at bedtime  finasteride 5 milliGRAM(s) Oral daily  gabapentin 300 milliGRAM(s) Oral every 8 hours  heparin  Infusion 1200 Unit(s)/Hr IV Continuous <Continuous>  insulin regular Infusion 3 Unit(s)/Hr IV Continuous <Continuous>  levothyroxine 25 MICROGram(s) Oral daily  methylPREDNISolone sodium succinate Injectable 20 milliGRAM(s) IV Push every 8 hours  multivitamin/minerals 1 Tablet(s) Oral daily  pantoprazole    Tablet 40 milliGRAM(s) Oral before breakfast  polyethylene glycol 3350 17 Gram(s) Oral daily  senna 2 Tablet(s) Oral at bedtime    MEDICATIONS  (PRN):  oxyCODONE    IR 5 milliGRAM(s) Oral every 4 hours PRN Moderate Pain (4 - 6)    OBJECTIVE:    Daily Weight in k.6 (2022 04:00)    POCT Blood Glucose.: 160 mg/dL (2022 07:58)  POCT Blood Glucose.: 171 mg/dL (2022 06:52)  POCT Blood Glucose.: 110 mg/dL (2022 05:57)  POCT Blood Glucose.: 135 mg/dL (2022 04:59)  POCT Blood Glucose.: 146 mg/dL (2022 03:58)  POCT Blood Glucose.: 172 mg/dL (2022 02:59)  POCT Blood Glucose.: 201 mg/dL (2022 01:53)  POCT Blood Glucose.: 231 mg/dL (2022 00:57)  POCT Blood Glucose.: 256 mg/dL (2022 00:07)  POCT Blood Glucose.: 293 mg/dL (2022 23:03)  POCT Blood Glucose.: 342 mg/dL (2022 21:57)  POCT Blood Glucose.: 393 mg/dL (2022 20:59)  POCT Blood Glucose.: 385 mg/dL (2022 19:56)  POCT Blood Glucose.: 330 mg/dL (2022 19:11)  POCT Blood Glucose.: 286 mg/dL (2022 18:09)  POCT Blood Glucose.: 259 mg/dL (2022 17:20)  POCT Blood Glucose.: 183 mg/dL (2022 16:20)  POCT Blood Glucose.: 181 mg/dL (2022 14:55)  POCT Blood Glucose.: 166 mg/dL (2022 14:09)  POCT Blood Glucose.: 131 mg/dL (2022 13:11)  POCT Blood Glucose.: 162 mg/dL (2022 12:02)  POCT Blood Glucose.: 175 mg/dL (2022 10:57)  POCT Blood Glucose.: 200 mg/dL (2022 10:04)  POCT Blood Glucose.: 206 mg/dL (2022 09:02)      PHYSICAL EXAM:       ICU Vital Signs Last 24 Hrs  T(C): 36.7 (2022 03:00), Max: 36.9 (2022 19:00)  T(F): 98.1 (2022 03:00), Max: 98.4 (2022 19:00)  HR: 71 (2022 08:00) (61 - 119)  BP: 139/65 (2022 08:00) (119/58 - 184/81)  BP(mean): 94 (2022 08:00) (82 - 116)  ABP: --  ABP(mean): --  RR: 14 (2022 08:00) (12 - 26)  SpO2: 91% (2022 08:00) (89% - 99%) on 1lpm nasal canula -> room air     General: Awake. Alert. Cooperative. No distress. Appears stated age. Obese. Sitting in the chair eating breakfast. Suctioning secretions from his mouth  HEENT: Atraumatic. Normocephalic. Anicteric. Normal oral mucosa. PERRL. EOMI.  Neck: Supple. Trachea midline. Thyroid without enlargement/tenderness/nodules. No carotid bruit. No JVD.	  Cardiovascular: Regular rate and rhythm. Distant S1 S2. No murmurs, rubs or gallops.  Respiratory: Respirations unlabored. Improved bilateral rhonchi and wheeze. No curvature.  Abdomen: Soft. Non-tender. Non-distended. No organomegaly. No masses. Normal bowel sounds.  Extremities: Warm to touch. No clubbing or cyanosis. No pedal edema. Right BKA with VAC dressing  Pulses: Decreased peripheral pulses LLE  Skin: Venous stasis changes left lower extremity  Lymph Nodes: Cervical, supraclavicular and axillary nodes normal  Neurological: Motor and sensory examination equal and normal. A and O x 3  Psychiatry: Appropriate mood and affect.    LABS:                          8.4    17.15 )-----------( 258      ( 2022 22:22 )             27.8     CBC    WBC  17.15 <==, 15.11 <==, 11.11 <==, 11.13 <==, 13.12 <==, 17.02 <==, 10.58 <==    Hemoglobin  8.4 <<==, 8.9 <<==, 8.6 <<==, 6.9 <<==, 7.1 <<==, 7.4 <<==, 7.7 <<==    Hematocrit  27.8 <==, 29.0 <==, 29.0 <==, 23.5 <==, 24.0 <==, 25.3 <==, 26.4 <==    Platelets  258 <==, 285 <==, 255 <==, 243 <==, 252 <==, 240 <==, 231 <==      139  |  103  |  102<H>  ----------------------------<  342<H>    04-11  4.7   |  21<L>  |  1.78<H>      LYTES    sodium  139 <==, 141 <==, 141 <==, 139 <==, 140 <==, 141 <==, 138 <==    potassium   4.7 <==, 4.1 <==, 3.6 <==, 3.9 <==, 4.4 <==, 4.0 <==, 4.6 <==    chloride  103 <==, 104 <==, 103 <==, 101 <==, 102 <==, 102 <==, 99 <==    carbon dioxide  21 <==, 20 <==, 22 <==, 21 <==, 24 <==, 24 <==, 21 <==    =============================================================================================  RENAL FUNCTION:    Creatinine:   1.78  <<==, 1.73  <<==, 1.82  <<==, 2.24  <<==, 2.44  <<==, 2.28  <<==, 2.25  <<==    BUN:   102 <==, 98 <==, 103 <==, 98 <==, 88 <==, 78 <==, 71 <==    ============================================================================================    calcium   9.1 <==, 9.5 <==, 9.1 <==, 9.1 <==, 9.2 <==, 9.1 <==, 9.1 <==    phos   3.4 <==, 3.3 <==, 3.6 <==, 4.4 <==, 4.2 <==, 5.0 <==, 3.4 <==    mag   2.4 <==, 2.3 <==, 2.4 <==, 2.5 <==, 2.4 <==, 2.4 <==, 2.4 <==    ============================================================================================  PT/INR - ( 2022 22:22 )   PT: 12.2 sec;   INR: 1.05 ratio       PTT - ( 2022 22:22 )  PTT:63.7 sec    Venous Blood Gas:   @ 22:23  7.40/39/45/24/78.1  VBG Lactate: 2.4    Venous Blood Gas:  04-10 @ 23:19  7.39/42/44/25/72.2  VBG Lactate: 2.5    ABG - ( 2022 06:20 )  pH: 7.47  /  pCO2: 38    /  pO2: 67    / HCO3: 28    / Base Excess: 3.8   /  SaO2: 94.3      ABG - ( 2022 23:24 )  pH: 7.43  /  pCO2: 36    /  pO2: 79    / HCO3: 24    / Base Excess: -0.1  /  SaO2: 97.2      ABG - ( 2022 18:53 )  pH: 7.44  /  pCO2: 39    /  pO2: 61    / HCO3: 26    / Base Excess: 2.2   /  SaO2: 92.4      < from: TTE with Doppler (w/Cont) (22 @ 15:07) >    Patient name: Martir Heart  YOB: 1935   Age: 86 (M)   MR#: [id number]  Study Date: 4/3/2022  Location: [address] [place], [place] [id number]: Angie Olivarez [facility]  Study quality: Technically difficult  Referring Physician: Anmol Quinn MD  BloodPressure: 115/70 mmHg  Height: 173 cm  Weight: 92 kg  BSA: 2.1 m2  ------------------------------------------------------------------------  PROCEDURE: Transthoracic echocardiogram with 2-D, M-Mode  and complete spectral and color flow Doppler. Verbal  consent was obtained for injection of  Ultrasonic Enhancing  Agent following a discussion of risks and benefits.  Following intravenous injection of Ultrasonic Enhancing  Agent, harmonic imaging was performed.  INDICATION: Cardiomyopathy, unspecified (I42.9)  ------------------------------------------------------------------------  Dimensions:    Normal Values:  LA:     3.1    2.0 - 4.0 cm  Ao:     3.5    2.0 - 3.8 cm  SEPTUM: 1.1    0.6 - 1.2 cm  PWT:    1.3    0.6 - 1.1 cm  LVIDd:  4.3    3.0- 5.6 cm  LVIDs:  3.2    1.8 - 4.0 cm  Derived variables:  LVMI: 90 g/m2  RWT: 0.60  Fractional short: 26 %  EF (Visual Estimate): NWV %  Doppler Peak Velocity (m/sec): MV=1.6 AoV=1.4  ------------------------------------------------------------------------  Observations:  Mitral Valve: Mitral valve not well visualized. Mitral  annular calcification. Peak mitral valve gradient equals 10  mm Hg, mean transmitral valve gradient equals 4 mm Hg,  consistent with mild mitral stenosis.  Aortic Valve/Aorta: Aortic valve not well visualized;  appears calcified. Peak transaortic valve gradient equals 8  mm Hg, mean transaortic valve gradient equals 3 mm Hg,  estimated aortic valve area equals 2 sqcm (by continuity  equation), aortic valve velocity time integral equals 22  cm, consistent with mild aortic stenosis. Peak left  ventricular outflow tract gradient equals 3 mm Hg, mean  gradient is equal to 1 mm Hg, LVOT velocity time integral  equals 12 cm.  Aortic Root: 3.5 cm.  Left Atrium: Normal left atrium.  Left Ventricle: Endocardial visualization enhanced with  intravenous injection of Ultrasonic Enhancing Agent  (Definity). Mild left ventricular systolic dysfunction. The  inferior wall, and the inferoseptum are hypokinetic.  Normal left ventricular internal dimensions and wall  thicknesses. Unable to evaluate diastology.  Right Heart: A device wire is noted in the right heart. The  right ventricle is not well visualized; grossly normal  right ventricular systolic function. Tricuspid valve not  well visualized. Pulmonic valve not well visualized,  probably normal.  Pericardium/Pleura: Normal pericardium with trace  pericardial effusion.  Hemodynamic: Estimated right atrial pressure is 8 mm Hg.  ------------------------------------------------------------------------  Conclusions:  1. Aortic valve not well visualized; appears calcified.  Peak transaortic valve gradient equals 8 mm Hg, mean  transaortic valve gradient equals 3 mm Hg, estimated aortic  valve area equals 2 sqcm (by continuity equation), aortic  valve velocity time integral equals 22 cm, consistent with  mild aortic stenosis.  2. Endocardial visualization enhanced with intravenous  injection of Ultrasonic Enhancing Agent (Definity). Mild  left ventricular systolic dysfunction. The inferior wall,  and the inferoseptum are hypokinetic.  3. The right ventricle is not well visualized; grossly  normal right ventricular systolic function.  ------------------------------------------------------------------------  Confirmed on  4/3/2022 - 17:12:14 by BRITTANY Giraldo  ------------------------------------------------------------------------  --------------------------------------------------------------------------------------------------------------  ---------------------------------------------------------------------------------------------------------------  MICROBIOLOGY:     COVID-19 PCR . (22 @ 18:23)   COVID-19 PCR: [facility][1]:     RADIOLOGY:  [x] Chest radiographs reviewed and interpreted by me     EXAM:  XR CHEST PORTABLE ROUTINE 1V                          PROCEDURE DATE:  2022      FINDINGS:    Support devices: A pacemaker overlies the left chest wall with its leads   intact.    Cardiac/mediastinum/hilum: Heart size cannot be accurately assessed on   this projection.    Lung parenchyma/Pleura: Right lower lung hazy opacities. Bibasilar   atelectasis. Trace left pleural effusion, unchanged. There is no   pneumothorax.    Skeleton/soft tissues: No acute osseous abnormalities.    IMPRESSION:    Right lower lung hazy opacities, which may represent atelectasis versus   infection.    Unchanged trace left pleural effusion.    EDITH HER MD; Resident Radiologist  This document has been electronically signed.  EVON MAN MD; Attending Radiologist  This document has been electronically signed. 2022  5:11PM  ---------------------------------------------------------------------------------------------------------------  EXAM:  XR CHEST PORTABLE ROUTINE 1V                          PROCEDURE DATE:  04/10/2022      FINDINGS:  Left pacemaker with leads in the right atrium and ventricle.    The heart size is not accurately assessed on this projection.  Bilateral lower lung field patchy opacities, left greater than right.  There is no pneumothorax. Trace left pleural effusion.    IMPRESSION:  Bilateral patchy opacities, left greater than right, differential   includes atelectasis or infection.    JAYDON MONTEMAYOR MD; Resident Radiologist  This document has been electronically signed.  SATURNINO CHERRY MD; Attending Radiologist  This document has been electronically signed. Apr 10 2022  9:14AM  --------------------------------------------------------------------------------------------  EXAM:  XR CHEST PORTABLE URGENT 1V                          PROCEDURE DATE:  2022      FINDINGS:  Left chest wall dual-lead pacemaker. Rotated exam limits assessment for   lead tip.    Small left pleural effusion with adjacent opacity. No pneumothorax.    Cardiac size cannot accurately be assessed in this projection.  Aortic   calcifications.      IMPRESSION: Small left pleural effusion with adjacent atelectasis   obscuring evaluation of the underlying lung.    DRE SANCHEZ MD; Resident Radiology  This document has been electronically signed.  WAGNER LAM MD; Attending Radiologist  This document has been electronically signed. 2022  5:54PM  ---------------------------------------------------------------------------------------------------------------  EXAM:  XR CHEST PORTABLE URGENT 1V                          PROCEDURE DATE:  2022      FINDINGS:  Left chest wall dual-lead pacemaker.  The heart is normal in size.  The lungs are clear.  There is no pneumothorax or pleural effusion.    IMPRESSION:  Clear lungs.    KENA CARRINGTON MD; Resident Radiologist  This document has been electronically signed.  SATURNINO CHERRY MD; Attending Radiologist  This document has been electronically signed. 2022 11:40AM  ---------------------------------------------------------------------------------------------------------------  EXAM:  XR CHEST PORTABLE URGENT 1V                          PROCEDURE DATE:  2022      FINDINGS:    Appropriate course of dual-chamber pacemaker. Unremarkable   cardiomediastinal silhouette. Minimal left basilar atelectasis. No   pleural effusion or pneumothorax.      IMPRESSION:    Mild left basilar atelectasis.    JOSEPH GAMINO M.D., ATTENDING RADIOGIST  This document has been electronically signed. Apr  3 2022  9:00AM  ---------------------------------------------------------------------------------------------------------------

## 2022-04-13 NOTE — PROGRESS NOTE ADULT - ASSESSMENT
86M with PMH of COPD (not on home o2), prior smoking history (40 pack years), HTN, HLD, Afib, CHF, T2DM, CVA, BPH, and PAD admitted for elective RLE AKA. Renal consulted for CKD Mx.    KARLOS on CKD 3,   baseline Cr ~1.7  serum k stable  rise likely 2/2 hemodynamic changes--> ATN  c/w hudson-- good urine output  cont monitor Serum Creatinine   eventual diuretics  off losartan   monitor BMP daily and u/o   dose all meds for eGFR  avoid NSAIDs/Nephrotoxics.    HTN, controlled. bp optimal   continue  BB. monitor bp    Rt LE nonhealing wound:  s/p amputation  follow up with vascular    Dr Knight  [phone]

## 2022-04-13 NOTE — PROGRESS NOTE ADULT - ASSESSMENT
ASSESSMENT:    86 year old gentleman, former smoker, followed by Dr. Alicja Rob of our practice for asthma/COPD overlap  syndrome and obstructive sleep apnea being treated conservatively. He has no history of TOM colonization/infection as listed in the "past medical history". He has been stable from a pulmonary perspective and maintained on budesonide and duoneb once daily and if needed. He also has a history of HTN, HLD, DM, CKD, CVA, CHF (mild systolic dysfunction) and atrial fibrillation with sick sinus syndrome s/p pacemaker implantation. He has been followed for many months for chronic foot wounds and leg pain now with some purulence and gangrene. The pain is exacerbated when laying in bed and improves with tylenol and when hanging the legs off of the bed. He is no longer able to ambulate. 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 The patient has developed marked shortness of breath with severe hypoxemia currently requiring a nasal canula @ 5lpm to maintain saturation @ 90%. He has a cough with copious mucous in his chest which he is unable to expectorate. He has chest congestion and wheeze. No fevers, chills or sweats. No chest pain/pressure or palpitations. Called by the patient's family and asked to be involved with his pulmonary care.    acute hypoxic respiratory failure -> resolved    1) severe COPD exacerbation -> slowly improving  2) 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 limiting diaphragmatic excursion and respiratory muscle weakness decreasing chest wall expansion -> bibasilar atelectasis  3) no evidence of pulmonary edema or pneumonia on the most recent CXR    leukocytosis more likely related to systemic steroids than infection    steroid induced hyperglycemia    CKD/KARLOS due to diuresis in the setting of euvolemia -> improved    PLAN/RECOMMENDATIONS:    stable oxygenation on room air  no further diuresis  ceftriaxone x 5 days  transition solumedrol -> prednisone 50mg daily - glucose control on steroids  albuterol/atrovent nebs q4h holding 2am dose  pulmicort 0.5mg nebs q12h - give after duoneb - rinse mouth after use  discontinue mucomyst and hypertonic saline nebs  mucinex 1200mg 2 times daily  singulair 10mg @ bedtime  acapella device/incentive spirometer  respiratory status continues to slowly improve - his hypoxemia has resolved and bronchospasm continues to improve - at this point, there is no pulmonary contraindication to the proposed AKA  cardiac meds: lipitor/toprol XL  flomax/proscar  vascular surgery follow-up    heparin gtt    insulin gtt for hyperglycemia - perhaps this can converted to SQ insulin with reduced dose of steroids    pain control  GI prophylaxis - protonix  proscar/flomax  bowel regimen  out of bed and into the chair    Will follow with you. Plan of care discussed with the patient and his family at bedside and with the ICU team.    Sarabjit Wright MD, [facility]  [phone]  Pulmonary Medicine

## 2022-04-13 NOTE — PROGRESS NOTE ADULT - SUBJECTIVE AND OBJECTIVE BOX
Vascular surgery Surgery Progress Note    INTERVAL EVENTS:   No acute events overnight.    SUBJECTIVE: Patient seen and examined at bedside with surgical team    OBJECTIVE:    Vital Signs Last 24 Hrs  T(C): 36.9 (12 Apr 2022 23:00), Max: 36.9 (12 Apr 2022 19:00)  T(F): 98.5 (12 Apr 2022 23:00), Max: 98.5 (12 Apr 2022 23:00)  HR: 79 (13 Apr 2022 00:00) (67 - 91)  BP: 161/70 (13 Apr 2022 00:00) (119/59 - 169/72)  BP(mean): 101 (13 Apr 2022 00:00) (84 - 119)  RR: 14 (13 Apr 2022 00:00) (12 - 34)  SpO2: 91% (13 Apr 2022 00:00) (84% - 98%)I&O's Detail    11 Apr 2022 07:01  -  12 Apr 2022 07:00  --------------------------------------------------------  IN:    Heparin: 384 mL    Insulin: 165.5 mL    IV PiggyBack: 50 mL    Oral Fluid: 525 mL  Total IN: 1124.5 mL    OUT:    Indwelling Catheter - Urethral (mL): 1680 mL    Stool (mL): 3 mL  Total OUT: 1683 mL    Total NET: -558.5 mL      12 Apr 2022 07:01  -  13 Apr 2022 00:09  --------------------------------------------------------  IN:    Heparin: 274 mL    Insulin: 130 mL    IV PiggyBack: 50 mL    Oral Fluid: 1150 mL  Total IN: 1604 mL    OUT:    Indwelling Catheter - Urethral (mL): 1215 mL    Stool (mL): 1 mL  Total OUT: 1216 mL    Total NET: 388 mL      MEDICATIONS  (STANDING):  acetaminophen     Tablet .. 975 milliGRAM(s) Oral every 6 hours  acetylcysteine 20%  Inhalation 4 milliLiter(s) Inhalation three times a day  albuterol/ipratropium for Nebulization 3 milliLiter(s) Nebulizer <User Schedule>  atorvastatin 40 milliGRAM(s) Oral at bedtime  buDESOnide    Inhalation Suspension 0.5 milliGRAM(s) Inhalation every 12 hours  finasteride 5 milliGRAM(s) Oral daily  gabapentin 300 milliGRAM(s) Oral every 8 hours  guaiFENesin ER 1200 milliGRAM(s) Oral every 12 hours  heparin  Infusion 1200 Unit(s)/Hr (18 mL/Hr) IV Continuous <Continuous>  insulin regular Infusion 3 Unit(s)/Hr (3 mL/Hr) IV Continuous <Continuous>  levothyroxine 25 MICROGram(s) Oral daily  methylPREDNISolone sodium succinate Injectable 20 milliGRAM(s) IV Push every 8 hours  metoprolol succinate ER 25 milliGRAM(s) Oral daily  montelukast 10 milliGRAM(s) Oral daily  multivitamin/minerals 1 Tablet(s) Oral daily  pantoprazole    Tablet 40 milliGRAM(s) Oral before breakfast  polyethylene glycol 3350 17 Gram(s) Oral daily  senna 2 Tablet(s) Oral at bedtime  sodium chloride 3%  Inhalation 4 milliLiter(s) Inhalation three times a day  tamsulosin 0.8 milliGRAM(s) Oral at bedtime    MEDICATIONS  (PRN):  oxyCODONE    IR 5 milliGRAM(s) Oral every 4 hours PRN Moderate Pain (4 - 6)  oxyCODONE    IR 10 milliGRAM(s) Oral every 4 hours PRN Severe Pain (7 - 10)      PHYSICAL EXAM:  Constitutional:   Respiratory:   Abdomen:   Extremities:     LABS:                        8.3    19.73 )-----------( 264      ( 12 Apr 2022 22:24 )             27.6     04-12    140  |  105  |  100<H>  ----------------------------<  129<H>  5.1   |  22  |  1.87<H>    Ca    9.0      12 Apr 2022 22:24  Phos  3.6     04-12  Mg     2.4     04-12      PT/INR - ( 12 Apr 2022 22:24 )   PT: 11.6 sec;   INR: 1.01 ratio         PTT - ( 12 Apr 2022 22:24 )  PTT:49.4 sec      ABO Interpretation: B (04-12-22 @ 22:24)      IMAGING:     Vascular surgery Surgery Progress Note    INTERVAL EVENTS:   No acute events overnight.    SUBJECTIVE: Patient seen and examined at bedside with surgical team. Patient noted to be sitting oob to chair. He reports minimal pain and well controlled. Denies any complaints.     OBJECTIVE:  Vital Signs Last 24 Hrs  T(C): 36.9 (12 Apr 2022 23:00), Max: 36.9 (12 Apr 2022 19:00)  T(F): 98.5 (12 Apr 2022 23:00), Max: 98.5 (12 Apr 2022 23:00)  HR: 79 (13 Apr 2022 00:00) (67 - 91)  BP: 161/70 (13 Apr 2022 00:00) (119/59 - 169/72)  BP(mean): 101 (13 Apr 2022 00:00) (84 - 119)  RR: 14 (13 Apr 2022 00:00) (12 - 34)  SpO2: 91% (13 Apr 2022 00:00) (84% - 98%)I&O's Detail    11 Apr 2022 07:01  -  12 Apr 2022 07:00  --------------------------------------------------------  IN:    Heparin: 384 mL    Insulin: 165.5 mL    IV PiggyBack: 50 mL    Oral Fluid: 525 mL  Total IN: 1124.5 mL    OUT:    Indwelling Catheter - Urethral (mL): 1680 mL    Stool (mL): 3 mL  Total OUT: 1683 mL    Total NET: -558.5 mL      12 Apr 2022 07:01  -  13 Apr 2022 00:09  --------------------------------------------------------  IN:    Heparin: 274 mL    Insulin: 130 mL    IV PiggyBack: 50 mL    Oral Fluid: 1150 mL  Total IN: 1604 mL    OUT:    Indwelling Catheter - Urethral (mL): 1215 mL    Stool (mL): 1 mL  Total OUT: 1216 mL    Total NET: 388 mL      MEDICATIONS  (STANDING):  acetaminophen     Tablet .. 975 milliGRAM(s) Oral every 6 hours  acetylcysteine 20%  Inhalation 4 milliLiter(s) Inhalation three times a day  albuterol/ipratropium for Nebulization 3 milliLiter(s) Nebulizer <User Schedule>  atorvastatin 40 milliGRAM(s) Oral at bedtime  buDESOnide    Inhalation Suspension 0.5 milliGRAM(s) Inhalation every 12 hours  finasteride 5 milliGRAM(s) Oral daily  gabapentin 300 milliGRAM(s) Oral every 8 hours  guaiFENesin ER 1200 milliGRAM(s) Oral every 12 hours  heparin  Infusion 1200 Unit(s)/Hr (18 mL/Hr) IV Continuous <Continuous>  insulin regular Infusion 3 Unit(s)/Hr (3 mL/Hr) IV Continuous <Continuous>  levothyroxine 25 MICROGram(s) Oral daily  methylPREDNISolone sodium succinate Injectable 20 milliGRAM(s) IV Push every 8 hours  metoprolol succinate ER 25 milliGRAM(s) Oral daily  montelukast 10 milliGRAM(s) Oral daily  multivitamin/minerals 1 Tablet(s) Oral daily  pantoprazole    Tablet 40 milliGRAM(s) Oral before breakfast  polyethylene glycol 3350 17 Gram(s) Oral daily  senna 2 Tablet(s) Oral at bedtime  sodium chloride 3%  Inhalation 4 milliLiter(s) Inhalation three times a day  tamsulosin 0.8 milliGRAM(s) Oral at bedtime    MEDICATIONS  (PRN):  oxyCODONE    IR 5 milliGRAM(s) Oral every 4 hours PRN Moderate Pain (4 - 6)  oxyCODONE    IR 10 milliGRAM(s) Oral every 4 hours PRN Severe Pain (7 - 10)      PHYSICAL EXAM:  Gen: NAD  CV: Regular rate  Resp: breathing comfortably on room air  Ext: RLE BKA stump with wound vac in place holding appropriate suctio    LABS:                        8.3    19.73 )-----------( 264      ( 12 Apr 2022 22:24 )             27.6     04-12    140  |  105  |  100<H>  ----------------------------<  129<H>  5.1   |  22  |  1.87<H>    Ca    9.0      12 Apr 2022 22:24  Phos  3.6     04-12  Mg     2.4     04-12      PT/INR - ( 12 Apr 2022 22:24 )   PT: 11.6 sec;   INR: 1.01 ratio         PTT - ( 12 Apr 2022 22:24 )  PTT:49.4 sec      ABO Interpretation: B (04-12-22 @ 22:24)      IMAGING:

## 2022-04-13 NOTE — PROGRESS NOTE ADULT - ATTENDING COMMENTS
85 yo m, s/p laura ADAMES for wet gangrene, pending revision. COPD exacerbation.    - Multimodal pain management. On parenteral Dilaudid PRN.  - AAOx3.   - COPD, solumedrol q8h. 3L NC sat >90. Back on COPD home meds. CXR B atelectasis.  - Lactate 1.8. Monitor hemodynamics.  - Ramez PO.  - PPI ppx.  - Heparin gtt per protocol.  - Net -114. Cr 1.87, at baseline.  - Hgb 8.3. Monitor.  - HR 60-90. NSR. Continue metoprolol.  - WBC 19, continue ceftriaxone.    - Insulin gtt wean. Endocrinology following.

## 2022-04-13 NOTE — OCCUPATIONAL THERAPY INITIAL EVALUATION ADULT - IMPAIRED TRANSFERS: BED/CHAIR, REHAB EVAL
impaired balance/cognition/impaired coordination/impaired motor control/pain/impaired postural control/decreased ROM/impaired sensory feedback/decreased strength

## 2022-04-13 NOTE — PROGRESS NOTE ADULT - SUBJECTIVE AND OBJECTIVE BOX
Patient seen and examined  no complaints    No Known Allergies    Hospital Medications:   MEDICATIONS  (STANDING):  acetaminophen     Tablet .. 975 milliGRAM(s) Oral every 6 hours  acetylcysteine 20%  Inhalation 4 milliLiter(s) Inhalation three times a day  albuterol/ipratropium for Nebulization 3 milliLiter(s) Nebulizer <User Schedule>  atorvastatin 40 milliGRAM(s) Oral at bedtime  buDESOnide    Inhalation Suspension 0.5 milliGRAM(s) Inhalation every 12 hours  cefTRIAXone   IVPB 1000 milliGRAM(s) IV Intermittent every 24 hours  finasteride 5 milliGRAM(s) Oral daily  gabapentin 300 milliGRAM(s) Oral every 8 hours  guaiFENesin ER 1200 milliGRAM(s) Oral every 12 hours  heparin  Infusion 1200 Unit(s)/Hr (18 mL/Hr) IV Continuous <Continuous>  insulin regular Infusion 3 Unit(s)/Hr (3 mL/Hr) IV Continuous <Continuous>  levothyroxine 25 MICROGram(s) Oral daily  methylPREDNISolone sodium succinate Injectable 20 milliGRAM(s) IV Push every 8 hours  metoprolol succinate ER 25 milliGRAM(s) Oral daily  montelukast 10 milliGRAM(s) Oral daily  multivitamin/minerals 1 Tablet(s) Oral daily  pantoprazole    Tablet 40 milliGRAM(s) Oral before breakfast  polyethylene glycol 3350 17 Gram(s) Oral daily  senna 2 Tablet(s) Oral at bedtime  sodium chloride 3%  Inhalation 4 milliLiter(s) Inhalation three times a day  tamsulosin 0.8 milliGRAM(s) Oral at bedtime        VITALS:  T(F): 97.5 (04-13-22 @ 12:00), Max: 98.5 (04-12-22 @ 23:00)  HR: 88 (04-13-22 @ 13:00)  BP: 167/70 (04-13-22 @ 13:00)  RR: 26 (04-13-22 @ 13:00)  SpO2: 91% (04-13-22 @ 13:00)  Wt(kg): --    04-12 @ 07:01  -  04-13 @ 07:00  --------------------------------------------------------  IN: 1767 mL / OUT: 2047 mL / NET: -280 mL    04-13 @ 07:01  -  04-13 @ 13:09  --------------------------------------------------------  IN: 46 mL / OUT: 220 mL / NET: -174 mL      Physical Exam :-  Constitutional: NAD  Respiratory: Bilateral equal breath sounds, no Crackles present.  Cardiovascular: S1, S2 normal, positive Murmur  Gastrointestinal: Bowel Sounds present, soft, non tender.  Extremities: + Edema Feet left lower ext ; right bka- with wound vac  Neurological: Alert and Oriented x 3  Psychiatric: Normal mood, normal affect    LABS:  04-12    140  |  105  |  100<H>  ----------------------------<  129<H>  5.1   |  22  |  1.87<H>    Ca    9.0      12 Apr 2022 22:24  Phos  3.6     04-12  Mg     2.4     04-12      Creatinine Trend: 1.87 <--, 1.78 <--, 1.73 <--, 1.82 <--, 2.24 <--, 2.44 <--, 2.28 <--, 2.25 <--, 1.89 <--                        8.3    19.73 )-----------( 264      ( 12 Apr 2022 22:24 )             27.6     Urine Studies:      RADIOLOGY & ADDITIONAL STUDIES:

## 2022-04-13 NOTE — PROGRESS NOTE ADULT - ASSESSMENT
86y male with PMHx of HTN, HLD, DM2 and nonhealing wounds of RLE who is non-ambulatory at home now s/p right guillotine BELOW knee amputation. Postoperative course c/b severe COPD exacerbation requiring excalation of O2 supplementation with HFNC. SICU consulted for close respiratory monitoring in the setting of COPD exacerbation. Improved saturation and work of breathing, now on room air.     PLAN:              - Appreciate excellent SICU care    Vascular Surgery  [id number]       86y male with PMHx of HTN, HLD, DM2 and nonhealing wounds of RLE who is non-ambulatory at home now s/p right guillotine BELOW knee amputation. Postoperative course c/b severe COPD exacerbation requiring excalation of O2 supplementation with HFNC. SICU consulted for close respiratory monitoring in the setting of COPD exacerbation. Improved saturation and work of breathing, now on room air.     PLAN:  - Continue hep gtt, goal aPTT 59-99  - Continue IV abx: Rocephin  - Pain control  - Regular diet  - Glucose control  - No plan for AKA on this admission.  - Wean off steriods  - Wean off insulin gtt hopefully today. appreciate endo recs  - Pt can go to the floor once off insulin gtt   - Appreciate excellent SICU care    Vascular Surgery  [id number]              - Appreciate excellent SICU care    Vascular Surgery  900

## 2022-04-13 NOTE — PROGRESS NOTE ADULT - SUBJECTIVE AND OBJECTIVE BOX
SICU Daily Progress Note  =====================================================  Interval/Overnight Events:     - C/w insulin gtt requirements  - No acute events overnight    HPI: 86y M with Hx DM, HTN, COPD, and HLD who presented with RLE pain (s/p angio 9/2021), found to have wet gangrene s/p R laura GOODMANA 4/4. Post-op course c/b severe COPD exacerbation requiring HFNC. SICU consulted for respiratory monitoring.     Allergies:   No Known Allergies    MEDICATIONS:   --------------------------------------------------------------------------------------  Neurologic Medications  acetaminophen     Tablet .. 975 milliGRAM(s) Oral every 6 hours  gabapentin 300 milliGRAM(s) Oral every 8 hours  oxyCODONE    IR 5 milliGRAM(s) Oral every 4 hours PRN Moderate Pain (4 - 6)  oxyCODONE    IR 10 milliGRAM(s) Oral every 4 hours PRN Severe Pain (7 - 10)    Respiratory Medications  acetylcysteine 20%  Inhalation 4 milliLiter(s) Inhalation three times a day  albuterol/ipratropium for Nebulization 3 milliLiter(s) Nebulizer <User Schedule>  buDESOnide    Inhalation Suspension 0.5 milliGRAM(s) Inhalation every 12 hours  guaiFENesin ER 1200 milliGRAM(s) Oral every 12 hours  montelukast 10 milliGRAM(s) Oral daily  sodium chloride 3%  Inhalation 4 milliLiter(s) Inhalation three times a day    Cardiovascular Medications  metoprolol succinate ER 25 milliGRAM(s) Oral daily  tamsulosin 0.8 milliGRAM(s) Oral at bedtime    Gastrointestinal Medications  multivitamin/minerals 1 Tablet(s) Oral daily  pantoprazole    Tablet 40 milliGRAM(s) Oral before breakfast  polyethylene glycol 3350 17 Gram(s) Oral daily  senna 2 Tablet(s) Oral at bedtime    Genitourinary Medications    Hematologic/Oncologic Medications  heparin  Infusion 1200 Unit(s)/Hr IV Continuous <Continuous>    Antimicrobial/Immunologic Medications    Endocrine/Metabolic Medications  atorvastatin 40 milliGRAM(s) Oral at bedtime  finasteride 5 milliGRAM(s) Oral daily  insulin regular Infusion 3 Unit(s)/Hr IV Continuous <Continuous>  levothyroxine 25 MICROGram(s) Oral daily  methylPREDNISolone sodium succinate Injectable 20 milliGRAM(s) IV Push every 8 hours    Topical/Other Medications    --------------------------------------------------------------------------------------  VITAL SIGNS, INS/OUTS (last 24 hours):  --------------------------------------------------------------------------------------  T(C): 36.9 (04-12-22 @ 23:00), Max: 36.9 (04-12-22 @ 19:00)  HR: 76 (04-13-22 @ 02:00) (67 - 91)  BP: 171/72 (04-13-22 @ 02:00) (119/59 - 171/72)  RR: 14 (04-13-22 @ 02:00) (12 - 34)  SpO2: 91% (04-13-22 @ 02:00) (84% - 98%)    04-11-22 @ 07:01  -  04-12-22 @ 07:00  --------------------------------------------------------  IN: 1124.5 mL / OUT: 1683 mL / NET: -558.5 mL    04-12-22 @ 07:01  -  04-13-22 @ 03:37  --------------------------------------------------------  IN: 1674 mL / OUT: 1591 mL / NET: 83 mL      --------------------------------------------------------------------------------------  EXAM  NEUROLOGY  Exam: Normal, NAD, alert, oriented x3, no focal deficits.    HEENT  Exam: Normocephalic, atraumatic, EOMI.     RESPIRATORY  Exam: Normal expansion/effort.  Mechanical Ventilation:     CARDIOVASCULAR  Exam: Regular rate and rhythm.       GI/NUTRITION  Exam: Abdomen soft, Non-tender, Non-distended.     VASCULAR  Exam: Extremities warm, pink, well-perfused.     MUSCULOSKELETAL  Exam: All extremities moving spontaneously without limitations.     SKIN  Exam: Good skin turgor, no skin breakdown.     LABS  --------------------------------------------------------------------------------------                        8.3    19.73 )-----------( 264      ( 12 Apr 2022 22:24 )             27.6   04-12    140  |  105  |  100<H>  ----------------------------<  129<H>  5.1   |  22  |  1.87<H>    Ca    9.0      12 Apr 2022 22:24  Phos  3.6     04-12  Mg     2.4     04-12    PT/INR - ( 12 Apr 2022 22:24 )   PT: 11.6 sec;   INR: 1.01 ratio         PTT - ( 12 Apr 2022 22:24 )  PTT:49.4 sec  --------------------------------------------------------------------------------------

## 2022-04-13 NOTE — OCCUPATIONAL THERAPY INITIAL EVALUATION ADULT - PERTINENT HX OF CURRENT PROBLEM, REHAB EVAL
86y M with Hx DM, HTN, COPD, and HLD who presented with RLE pain (s/p angio 9/2021), found to have wet gangrene s/p R laura JAMES 4/4. Post-op course c/b severe COPD exacerbation requiring HFNC. SICU consulted for respiratory monitoring.

## 2022-04-13 NOTE — PROGRESS NOTE ADULT - SUBJECTIVE AND OBJECTIVE BOX
Ritesh Bell MD  Interventional Cardiology / Advance Heart Failure and Cardiac Transplant Specialist  [place] Office : [phone] [place] [address] [place] [address]  Tel:   [place] Office : [address] [facility] [place] [address]  Tel: [phone]       Pt is lying in bed comfortable not in distress, some SOB   	  MEDICATIONS:  heparin  Infusion 1200 Unit(s)/Hr IV Continuous <Continuous>  metoprolol succinate ER 25 milliGRAM(s) Oral daily  tamsulosin 0.8 milliGRAM(s) Oral at bedtime    cefTRIAXone   IVPB 1000 milliGRAM(s) IV Intermittent every 24 hours    albuterol/ipratropium for Nebulization 3 milliLiter(s) Nebulizer every 6 hours  buDESOnide    Inhalation Suspension 0.5 milliGRAM(s) Inhalation every 12 hours  guaiFENesin ER 1200 milliGRAM(s) Oral every 12 hours  montelukast 10 milliGRAM(s) Oral daily    acetaminophen     Tablet .. 975 milliGRAM(s) Oral every 6 hours  gabapentin 300 milliGRAM(s) Oral every 8 hours  oxyCODONE    IR 5 milliGRAM(s) Oral every 4 hours PRN  oxyCODONE    IR 10 milliGRAM(s) Oral every 4 hours PRN    pantoprazole    Tablet 40 milliGRAM(s) Oral before breakfast  polyethylene glycol 3350 17 Gram(s) Oral daily  senna 2 Tablet(s) Oral at bedtime    atorvastatin 40 milliGRAM(s) Oral at bedtime  finasteride 5 milliGRAM(s) Oral daily  insulin glargine Injectable (LANTUS) 10 Unit(s) SubCutaneous once  insulin lispro (ADMELOG) corrective regimen sliding scale   SubCutaneous three times a day before meals  insulin lispro (ADMELOG) corrective regimen sliding scale   SubCutaneous at bedtime  insulin lispro Injectable (ADMELOG) 12 Unit(s) SubCutaneous three times a day before meals  insulin lispro Injectable (ADMELOG) 8 Unit(s) SubCutaneous once  levothyroxine 25 MICROGram(s) Oral daily  predniSONE   Tablet 50 milliGRAM(s) Oral daily    multivitamin/minerals 1 Tablet(s) Oral daily      PAST MEDICAL/SURGICAL HISTORY  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    Deep Vein Thrombosis (DVT)  17 yeras ago on Coumadin    CHF (congestive heart failure)  last exacerbation in 1/2017    Enlarged prostate    GERD (gastroesophageal reflux disease)    Hernia  umblical    Calculus of bile duct without cholangitis or cholecystitis without obstruction    Atrial fibrillation    S/P Hernia Repair    S/P cataract surgery, unspecified laterality    S/P ERCP  3/2017        SOCIAL HISTORY: Substance Use (street drugs): ( x ) never used  (  ) other:    FAMILY HISTORY:         PHYSICAL EXAM:  T(C): 36.2 (04-13-22 @ 20:00), Max: 36.9 (04-12-22 @ 23:00)  HR: 83 (04-13-22 @ 22:00) (67 - 98)  BP: 154/68 (04-13-22 @ 22:00) (134/72 - 179/79)  RR: 16 (04-13-22 @ 22:00) (10 - 30)  SpO2: 92% (04-13-22 @ 22:00) (88% - 100%)  Wt(kg): --  I&O's Summary    12 Apr 2022 07:01  -  13 Apr 2022 07:00  --------------------------------------------------------  IN: 1767 mL / OUT: 2047 mL / NET: -280 mL    13 Apr 2022 07:01  -  13 Apr 2022 22:50  --------------------------------------------------------  IN: 1289 mL / OUT: 1240 mL / NET: 49 mL             EYES:   PERRLA   ENMT:   Moist mucous membranes, Good dentition, No lesions  Cardiovascular: Normal S1 S2, No JVD, No murmurs, No edema  Respiratory: b/l rhonchi   Gastrointestinal:  Soft, Non-tender, + BS	  Extremities: s/p BKA                                     8.3    19.73 )-----------( 264      ( 12 Apr 2022 22:24 )             27.6     04-12    140  |  105  |  100<H>  ----------------------------<  129<H>  5.1   |  22  |  1.87<H>    Ca    9.0      12 Apr 2022 22:24  Phos  3.6     04-12  Mg     2.4     04-12      proBNP:   Lipid Profile:   HgA1c:   TSH:     Consultant(s) Notes Reviewed:  [x ] YES  [ ] NO    Care Discussed with Consultants/Other Providers [ x] YES  [ ] NO    Imaging Personally Reviewed independently:  [x] YES  [ ] NO    All labs, radiologic studies, vitals, orders and medications list reviewed. Patient is seen and examined at bedside. Case discussed with medical team.

## 2022-04-13 NOTE — PROGRESS NOTE ADULT - ASSESSMENT
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 Post-op course c/b severe COPD exacerbation requiring HFNC. SICU consulted for respiratory monitoring.     PLAN:   Neuro: post-op pain  - Pain control: Tylenol 975mg PO q6hr ATC, Oxycodone 5/10mg q4hr PRN, Gabapentin 300mg PO q8hr  - Multivitamin    Respiratory: COPD exacerbation   - 3L NC  - Monitor respiratory status  - Duonebs, mucinex, pulmicort, montelukast,    Cardiovascular: h/o HTN, HLD, AF  - Monitor vitals signs  - C/w home metoprolol 25mg PO daily for BP control -- metolazone, losartan HELD iso worsening KARLOS  - C/w home atorvastatin 40mg PO HS for HLD    Gastrointestinal: no active issues   - Diet: Regular CC  - Protonix 40mg PO qD  - Bowel regimen: Senna, Miralax    Genitourinary/Renal: h/o urinary retention  - C/w home flomax, finasteride  - Monitor UOP, [place] for strict I/O monitoring  - Trend electrolytes on BMP qD, replete PRN    Heme: no active issues   - Hep gtt for AF, monitor PTT qD  - ASA 81 HELD  - Trend H/H on CBC qD    ID: RLE wet gangrene s/p guilyunierine R BKA 4/4  - Abx: None  - Trend WBC on CBC qD  - Monitor fever curve    Endocrine: h/o DM, hypothyroidism  - Insulin gtt, wean as tolerated  - Solumedrol 20mg IV q8h for COPD exacerbation   - C/w home Synthroid 25mcg PO qD    Lines:   - PIV x 2  - [place]    Code Status: Full Code  Dispo: SICU

## 2022-04-13 NOTE — PROGRESS NOTE ADULT - SUBJECTIVE AND OBJECTIVE BOX
[facility] - PROGRESS NOTE    CHIEF COMPLAINT: acute hypoxic respiratory failure; COPD exacerbation; mucous plugging; atelectasis; weak cough; pleural effusion; right foot gangrene s/p BKA and awaiting AKA    INTERVAL HISTORY: remains in the ICU due to the ongoing use of an insulin infusion for steroid induced hyperglycemia; awake and alert sitting in the chair and suctioning secretions out of his mouth; awaiting VAC dressing exchange; diffuse pain; no shortness of breath or hypoxemia on room air; decreasing cough but still with difficulty expectorating mucous; improved chest congestion and wheeze; no fevers, chills or sweats; no chest pain/pressure or palpitations; CXR is with a small left pleural effusion with bibasilar atelectasis - there is no evidence of pulmonary edema; heparin gtt for PAD    REVIEW OF SYSTEMS:  Constitutional: As per interval history  HEENT: Within normal limits  CV: As per interval history  Resp: As per interval history  GI: Within normal limits   : KARLOS -> resolved  Musculoskeletal: s/p right BKA  Skin: Within normal limits  Neurological: Within normal limits  Psychiatric: Within normal limits  Endocrine: hyperglycemia  Hematologic/Lymphatic: Within normal limits  Allergic/Immunologic: Within normal limits        MEDICATIONS:     Pulmonary "  albuterol/ipratropium for Nebulization 3 milliLiter(s) Nebulizer <User Schedule>  buDESOnide    Inhalation Suspension 0.5 milliGRAM(s) Inhalation every 12 hours  guaiFENesin ER 1200 milliGRAM(s) Oral every 12 hours  montelukast 10 milliGRAM(s) Oral daily    Anti-microbials:  cefTRIAXone   IVPB 1000 milliGRAM(s) IV Intermittent every 24 hours    Cardiovascular:  metoprolol succinate ER 25 milliGRAM(s) Oral daily  tamsulosin 0.8 milliGRAM(s) Oral at bedtime    Other:  acetaminophen     Tablet .. 975 milliGRAM(s) Oral every 6 hours  atorvastatin 40 milliGRAM(s) Oral at bedtime  finasteride 5 milliGRAM(s) Oral daily  gabapentin 300 milliGRAM(s) Oral every 8 hours  heparin  Infusion 1200 Unit(s)/Hr IV Continuous <Continuous>  insulin glargine Injectable (LANTUS) 30 Unit(s) SubCutaneous once  insulin lispro (ADMELOG) corrective regimen sliding scale   SubCutaneous at bedtime  insulin lispro (ADMELOG) corrective regimen sliding scale   SubCutaneous three times a day before meals  insulin lispro Injectable (ADMELOG) 12 Unit(s) SubCutaneous three times a day before meals  levothyroxine 25 MICROGram(s) Oral daily  multivitamin/minerals 1 Tablet(s) Oral daily  pantoprazole    Tablet 40 milliGRAM(s) Oral before breakfast  polyethylene glycol 3350 17 Gram(s) Oral daily  senna 2 Tablet(s) Oral at bedtime    MEDICATIONS  (PRN):  oxyCODONE    IR 10 milliGRAM(s) Oral every 4 hours PRN Severe Pain (7 - 10)  oxyCODONE    IR 5 milliGRAM(s) Oral every 4 hours PRN Moderate Pain (4 - 6)        OBJECTIVE:    POCT Blood Glucose.: 123 mg/dL (13 Apr 2022 13:56)  POCT Blood Glucose.: 120 mg/dL (13 Apr 2022 12:59)  POCT Blood Glucose.: 130 mg/dL (13 Apr 2022 11:57)  POCT Blood Glucose.: 151 mg/dL (13 Apr 2022 10:54)  POCT Blood Glucose.: 153 mg/dL (13 Apr 2022 10:01)  POCT Blood Glucose.: 135 mg/dL (13 Apr 2022 09:02)  POCT Blood Glucose.: 130 mg/dL (13 Apr 2022 07:57)  POCT Blood Glucose.: 142 mg/dL (13 Apr 2022 06:56)  POCT Blood Glucose.: 103 mg/dL (13 Apr 2022 06:03)  POCT Blood Glucose.: 132 mg/dL (13 Apr 2022 04:59)  POCT Blood Glucose.: 155 mg/dL (13 Apr 2022 04:03)  POCT Blood Glucose.: 162 mg/dL (13 Apr 2022 02:55)  POCT Blood Glucose.: 148 mg/dL (13 Apr 2022 01:58)  POCT Blood Glucose.: 146 mg/dL (13 Apr 2022 00:55)  POCT Blood Glucose.: 144 mg/dL (12 Apr 2022 23:58)  POCT Blood Glucose.: 109 mg/dL (12 Apr 2022 23:04)  POCT Blood Glucose.: 126 mg/dL (12 Apr 2022 21:59)  POCT Blood Glucose.: 140 mg/dL (12 Apr 2022 20:57)  POCT Blood Glucose.: 139 mg/dL (12 Apr 2022 20:01)  POCT Blood Glucose.: 183 mg/dL (12 Apr 2022 19:04)  POCT Blood Glucose.: 174 mg/dL (12 Apr 2022 18:18)  POCT Blood Glucose.: 148 mg/dL (12 Apr 2022 17:14)  POCT Blood Glucose.: 145 mg/dL (12 Apr 2022 16:12)  POCT Blood Glucose.: 207 mg/dL (12 Apr 2022 15:09)      PHYSICAL EXAM:       ICU Vital Signs Last 24 Hrs  T(C): 36.4 (13 Apr 2022 12:00), Max: 36.9 (12 Apr 2022 19:00)  T(F): 97.5 (13 Apr 2022 12:00), Max: 98.5 (12 Apr 2022 23:00)  HR: 85 (13 Apr 2022 14:00) (67 - 91)  BP: 162/70 (13 Apr 2022 14:00) (134/72 - 179/79)  BP(mean): 100 (13 Apr 2022 14:00) (85 - 113)  ABP: --  ABP(mean): --  RR: 24 (13 Apr 2022 14:00) (11 - 34)  SpO2: 89% (13 Apr 2022 14:00) (84% - 100%) on room air    General: Awake. Alert. Cooperative. No distress. Appears stated age. Obese. Sitting in the chair eating breakfast. Suctioning secretions from his mouth  HEENT: Atraumatic. Normocephalic. Anicteric. Normal oral mucosa. PERRL. EOMI.  Neck: Supple. Trachea midline. Thyroid without enlargement/tenderness/nodules. No carotid bruit. No JVD.	  Cardiovascular: Regular rate and rhythm. Distant S1 S2. No murmurs, rubs or gallops.  Respiratory: Respirations unlabored. Improved bilateral rhonchi and wheeze. No curvature.  Abdomen: Soft. Non-tender. Non-distended. No organomegaly. No masses. Normal bowel sounds.  Extremities: Warm to touch. No clubbing or cyanosis. No pedal edema. Right BKA with VAC dressing  Pulses: Decreased peripheral pulses LLE  Skin: Venous stasis changes left lower extremity  Lymph Nodes: Cervical, supraclavicular and axillary nodes normal  Neurological: Motor and sensory examination equal and normal. A and O x 3  Psychiatry: Appropriate mood and affect.    LABS:                          8.3    19.73 )-----------( 264      ( 12 Apr 2022 22:24 )             27.6     CBC    WBC  19.73 <==, 17.15 <==, 15.11 <==, 11.11 <==, 11.13 <==, 13.12 <==, 17.02 <==    Hemoglobin  8.3 <<==, 8.4 <<==, 8.9 <<==, 8.6 <<==, 6.9 <<==, 7.1 <<==, 7.4 <<==    Hematocrit  27.6 <==, 27.8 <==, 29.0 <==, 29.0 <==, 23.5 <==, 24.0 <==, 25.3 <==    Platelets  264 <==, 258 <==, 285 <==, 255 <==, 243 <==, 252 <==, 240 <==      140  |  105  |  100<H>  ----------------------------<  129<H>    04-12  5.1   |  22  |  1.87<H>      LYTES    sodium  140 <==, 139 <==, 141 <==, 141 <==, 139 <==, 140 <==, 141 <==    potassium   5.1 <==, 4.7 <==, 4.1 <==, 3.6 <==, 3.9 <==, 4.4 <==, 4.0 <==    chloride  105 <==, 103 <==, 104 <==, 103 <==, 101 <==, 102 <==, 102 <==    carbon dioxide  22 <==, 21 <==, 20 <==, 22 <==, 21 <==, 24 <==, 24 <==    =============================================================================================  RENAL FUNCTION:    Creatinine:   1.87  <<==, 1.78  <<==, 1.73  <<==, 1.82  <<==, 2.24  <<==, 2.44  <<==, 2.28  <<==    BUN:   100 <==, 102 <==, 98 <==, 103 <==, 98 <==, 88 <==, 78 <==    ============================================================================================    calcium   9.0 <==, 9.1 <==, 9.5 <==, 9.1 <==, 9.1 <==, 9.2 <==, 9.1 <==    phos   3.6 <==, 3.4 <==, 3.3 <==, 3.6 <==, 4.4 <==, 4.2 <==, 5.0 <==    mag   2.4 <==, 2.4 <==, 2.3 <==, 2.4 <==, 2.5 <==, 2.4 <==, 2.4 <==    ============================================================================================  PT/INR - ( 12 Apr 2022 22:24 )   PT: 11.6 sec;   INR: 1.01 ratio         PTT - ( 13 Apr 2022 12:14 )  PTT:53.6 sec    Venous Blood Gas:  04-12 @ 22:10  7.40/41/52/25/84.4  VBG Lactate: 1.8    Venous Blood Gas:  04-11 @ 22:23  7.40/39/45/24/78.1  VBG Lactate: 2.4    Venous Blood Gas:  04-11 @ 22:23  7.40/39/45/24/78.1  VBG Lactate: 2.4    Venous Blood Gas:  04-10 @ 23:19  7.39/42/44/25/72.2  VBG Lactate: 2.5    < from: TTE with Doppler (w/Cont) (04.03.22 @ 15:07) >    Patient name: Martir Heart  YOB: 1935   Age: 86 (M)   MR#: [id number]  Study Date: 4/3/2022  Location: [address] [place], [place] [id number]: Angie Olivarez RDCS  Study quality: Technically difficult  Referring Physician: Anmol Quinn MD  BloodPressure: 115/70 mmHg  Height: 173 cm  Weight: 92 kg  BSA: 2.1 m2  ------------------------------------------------------------------------  PROCEDURE: Transthoracic echocardiogram with 2-D, M-Mode  and complete spectral and color flow Doppler. Verbal  consent was obtained for injection of  Ultrasonic Enhancing  Agent following a discussion of risks and benefits.  Following intravenous injection of Ultrasonic Enhancing  Agent, harmonic imaging was performed.  INDICATION: Cardiomyopathy, unspecified (I42.9)  ------------------------------------------------------------------------  Dimensions:    Normal Values:  LA:     3.1    2.0 - 4.0 cm  Ao:     3.5    2.0 - 3.8 cm  SEPTUM: 1.1    0.6 - 1.2 cm  PWT:    1.3    0.6 - 1.1 cm  LVIDd:  4.3    3.0- 5.6 cm  LVIDs:  3.2    1.8 - 4.0 cm  Derived variables:  LVMI: 90 g/m2  RWT: 0.60  Fractional short: 26 %  EF (Visual Estimate): NWV %  Doppler Peak Velocity (m/sec): MV=1.6 AoV=1.4  ------------------------------------------------------------------------  Observations:  Mitral Valve: Mitral valve not well visualized. Mitral  annular calcification. Peak mitral valve gradient equals 10  mm Hg, mean transmitral valve gradient equals 4 mm Hg,  consistent with mild mitral stenosis.  Aortic Valve/Aorta: Aortic valve not well visualized;  appears calcified. Peak transaortic valve gradient equals 8  mm Hg, mean transaortic valve gradient equals 3 mm Hg,  estimated aortic valve area equals 2 sqcm (by continuity  equation), aortic valve velocity time integral equals 22  cm, consistent with mild aortic stenosis. Peak left  ventricular outflow tract gradient equals 3 mm Hg, mean  gradient is equal to 1 mm Hg, LVOT velocity time integral  equals 12 cm.  Aortic Root: 3.5 cm.  Left Atrium: Normal left atrium.  Left Ventricle: Endocardial visualization enhanced with  intravenous injection of Ultrasonic Enhancing Agent  (Definity). Mild left ventricular systolic dysfunction. The  inferior wall, and the inferoseptum are hypokinetic.  Normal left ventricular internal dimensions and wall  thicknesses. Unable to evaluate diastology.  Right Heart: A device wire is noted in the right heart. The  right ventricle is not well visualized; grossly normal  right ventricular systolic function. Tricuspid valve not  well visualized. Pulmonic valve not well visualized,  probably normal.  Pericardium/Pleura: Normal pericardium with trace  pericardial effusion.  Hemodynamic: Estimated right atrial pressure is 8 mm Hg.  ------------------------------------------------------------------------  Conclusions:  1. Aortic valve not well visualized; appears calcified.  Peak transaortic valve gradient equals 8 mm Hg, mean  transaortic valve gradient equals 3 mm Hg, estimated aortic  valve area equals 2 sqcm (by continuity equation), aortic  valve velocity time integral equals 22 cm, consistent with  mild aortic stenosis.  2. Endocardial visualization enhanced with intravenous  injection of Ultrasonic Enhancing Agent (Definity). Mild  left ventricular systolic dysfunction. The inferior wall,  and the inferoseptum are hypokinetic.  3. The right ventricle is not well visualized; grossly  normal right ventricular systolic function.  ------------------------------------------------------------------------  Confirmed on  4/3/2022 - 17:12:14 by BRITTANY Giraldo  ------------------------------------------------------------------------  --------------------------------------------------------------------------------------------------------------  ---------------------------------------------------------------------------------------------------------------  MICROBIOLOGY:     COVID-19 PCR . (04.01.22 @ 18:23)   COVID-19 PCR: NotDetec:     RADIOLOGY:  [x] Chest radiographs reviewed and interpreted by me    EXAM:  XR CHEST PORTABLE ROUTINE 1V                          PROCEDURE DATE:  04/13/2022      FINDINGS:    Support devices: A pacemaker overlies left chest wall with its leads   intact.    Cardiac/mediastinum/hilum: Heart size cannot be accurately assessed on   this projection.    Lung parenchyma/Pleura: Bilateral lower lung hazy opacities, unchanged.   There is no pneumothorax.    Skeleton/soft tissues: No acute osseous abnormalities.    IMPRESSION:    Unchanged bilateral lower lung hazy opacities may represent atelectasis   versus infection.    EDITH HER MD; Resident Radiologist  This document has been electronically signed.  SATURNINO CHERRY MD; Attending Radiologist  This document has been electronically signed. Apr 13 2022 10:54AM  ---------------------------------------------------------------------------------------------------------------   EXAM:  XR CHEST PORTABLE ROUTINE 1V                          PROCEDURE DATE:  04/11/2022      FINDINGS:    Support devices: A pacemaker overlies the left chest wall with its leads   intact.    Cardiac/mediastinum/hilum: Heart size cannot be accurately assessed on   this projection.    Lung parenchyma/Pleura: Right lower lung hazy opacities. Bibasilar   atelectasis. Trace left pleural effusion, unchanged. There is no   pneumothorax.    Skeleton/soft tissues: No acute osseous abnormalities.    IMPRESSION:    Right lower lung hazy opacities, which may represent atelectasis versus   infection.    Unchanged trace left pleural effusion.    EDITH HER MD; Resident Radiologist  This document has been electronically signed.  EVON MAN MD; Attending Radiologist  This document has been electronically signed. Apr 11 2022  5:11PM  ---------------------------------------------------------------------------------------------------------------  EXAM:  XR CHEST PORTABLE ROUTINE 1V                          PROCEDURE DATE:  04/10/2022      FINDINGS:  Left pacemaker with leads in the right atrium and ventricle.    The heart size is not accurately assessed on this projection.  Bilateral lower lung field patchy opacities, left greater than right.  There is no pneumothorax. Trace left pleural effusion.    IMPRESSION:  Bilateral patchy opacities, left greater than right, differential   includes atelectasis or infection.    JAYDON MONETMAYOR MD; Resident Radiologist  This document has been electronically signed.  SATURNINO CHERRY MD; Attending Radiologist  This document has been electronically signed. Apr 10 2022  9:14AM  --------------------------------------------------------------------------------------------  EXAM:  XR CHEST PORTABLE URGENT 1V                          PROCEDURE DATE:  04/07/2022      FINDINGS:  Left chest wall dual-lead pacemaker. Rotated exam limits assessment for   lead tip.    Small left pleural effusion with adjacent opacity. No pneumothorax.    Cardiac size cannot accurately be assessed in this projection.  Aortic   calcifications.      IMPRESSION: Small left pleural effusion with adjacent atelectasis   obscuring evaluation of the underlying lung.    DRE SANCHEZ MD; Resident Radiology  This document has been electronically signed.  WAGNER LAM MD; Attending Radiologist  This document has been electronically signed. Apr 7 2022  5:54PM  ---------------------------------------------------------------------------------------------------------------  EXAM:  XR CHEST PORTABLE URGENT 1V                          PROCEDURE DATE:  04/05/2022      FINDINGS:  Left chest wall dual-lead pacemaker.  The heart is normal in size.  The lungs are clear.  There is no pneumothorax or pleural effusion.    IMPRESSION:  Clear lungs.    KENA CARRINGTON MD; Resident Radiologist  This document has been electronically signed.  SATURNINO CHERRY MD; Attending Radiologist  This document has been electronically signed. Apr 6 2022 11:40AM  ---------------------------------------------------------------------------------------------------------------  EXAM:  XR CHEST PORTABLE URGENT 1V                          PROCEDURE DATE:  04/02/2022      FINDINGS:    Appropriate course of dual-chamber pacemaker. Unremarkable   cardiomediastinal silhouette. Minimal left basilar atelectasis. No   pleural effusion or pneumothorax.      IMPRESSION:    Mild left basilar atelectasis.    JOSEPH GAMINO M.D., ATTENDING RADIOGIST  This document has been electronically signed. Apr  3 2022  9:00AM  ---------------------------------------------------------------------------------------------------------------

## 2022-04-14 NOTE — PROGRESS NOTE ADULT - SUBJECTIVE AND OBJECTIVE BOX
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    INTERVAL HISTORY: remains in the ICU; continues on an insulin infusion for steroid induced hyperglycemia; worsening of his mental status and chronic left sided weakness and left facial droop after analgesics prior to the VAC change yesterday; CT scan and EEG are unremarkable; started on zosyn for possible "sepsis"; now awake and alert sitting in the chair and back to his baseline mental status; no shortness of breath or hypoxemia on room air; occasional cough productive of scant sputum; minimal chest congestion and wheeze; no fevers, chills or sweats; no chest pain/pressure or palpitations; CXR is with a small left pleural effusion with bibasilar atelectasis - there is no evidence of pulmonary edema; on heparin gtt for PAD    REVIEW OF SYSTEMS:  Constitutional: As per interval history  HEENT: Within normal limits  CV: As per interval history  Resp: As per interval history  GI: Within normal limits   : KARLOS -> resolved  Musculoskeletal: s/p right BKA  Skin: Within normal limits  Neurological: Within normal limits  Psychiatric: Within normal limits  Endocrine: hyperglycemia  Hematologic/Lymphatic: Within normal limits  Allergic/Immunologic: Within normal limits    MEDICATIONS:     Pulmonary "  albuterol/ipratropium for Nebulization 3 milliLiter(s) Nebulizer every 6 hours  buDESOnide    Inhalation Suspension 0.5 milliGRAM(s) Inhalation every 12 hours  guaiFENesin ER 1200 milliGRAM(s) Oral every 12 hours  montelukast 10 milliGRAM(s) Oral daily    Anti-microbials:  piperacillin/tazobactam IVPB.. 3.375 Gram(s) IV Intermittent every 8 hours    Cardiovascular:  metoprolol succinate ER 25 milliGRAM(s) Oral daily  tamsulosin 0.8 milliGRAM(s) Oral at bedtime    Other:  acetaminophen     Tablet .. 975 milliGRAM(s) Oral every 8 hours  atorvastatin 40 milliGRAM(s) Oral at bedtime  chlorhexidine 2% Cloths 1 Application(s) Topical <User Schedule>  finasteride 5 milliGRAM(s) Oral daily  gabapentin 300 milliGRAM(s) Oral every 8 hours  heparin  Infusion 1200 Unit(s)/Hr IV Continuous <Continuous>  insulin regular Infusion 3 Unit(s)/Hr IV Continuous <Continuous>  levothyroxine 25 MICROGram(s) Oral daily  multivitamin/minerals 1 Tablet(s) Oral daily  pantoprazole    Tablet 40 milliGRAM(s) Oral before breakfast  polyethylene glycol 3350 17 Gram(s) Oral daily  predniSONE   Tablet 50 milliGRAM(s) Oral daily  senna 2 Tablet(s) Oral at bedtime    MEDICATIONS  (PRN):  oxyCODONE    IR 10 milliGRAM(s) Oral every 4 hours PRN Severe Pain (7 - 10)  oxyCODONE    IR 5 milliGRAM(s) Oral every 4 hours PRN Moderate Pain (4 - 6)    OBJECTIVE:    POCT Blood Glucose.: 153 mg/dL (2022 15:13)  POCT Blood Glucose.: 174 mg/dL (2022 14:02)  POCT Blood Glucose.: 122 mg/dL (2022 12:57)  POCT Blood Glucose.: 181 mg/dL (2022 12:04)  POCT Blood Glucose.: 258 mg/dL (2022 10:55)  POCT Blood Glucose.: 251 mg/dL (2022 10:00)  POCT Blood Glucose.: 258 mg/dL (2022 08:59)  POCT Blood Glucose.: 302 mg/dL (2022 08:11)  POCT Blood Glucose.: 282 mg/dL (2022 06:51)  POCT Blood Glucose.: 311 mg/dL (2022 05:24)  POCT Blood Glucose.: 331 mg/dL (2022 21:46)  POCT Blood Glucose.: 149 mg/dL (2022 17:15)  POCT Blood Glucose.: 150 mg/dL (2022 16:06)      PHYSICAL EXAM:       ICU Vital Signs Last 24 Hrs  T(C): 37 (2022 12:00), Max: 37 (2022 12:00)  T(F): 98.6 (2022 12:00), Max: 98.6 (2022 12:00)  HR: 74 (2022 15:00) (69 - 99)  BP: 156/67 (2022 15:00) (125/58 - 161/69)  BP(mean): 97 (2022 15:00) (83 - 107)  ABP: --  ABP(mean): --  RR: 21 (2022 15:00) (10 - 35)  SpO2: 91% (2022 15:00) (90% - 100%) on room air     General: Awake. Alert. Cooperative. No distress. Appears stated age. Obese. Sitting in the chair talking with family.  HEENT: Atraumatic. Normocephalic. Anicteric. Normal oral mucosa. PERRL. EOMI.  Neck: Supple. Trachea midline. Thyroid without enlargement/tenderness/nodules. No carotid bruit. No JVD.	  Cardiovascular: Regular rate and rhythm. Distant S1 S2. No murmurs, rubs or gallops.  Respiratory: Respirations unlabored. Mild bilateral rhonchi and wheeze. No curvature.  Abdomen: Soft. Non-tender. Non-distended. No organomegaly. No masses. Normal bowel sounds.  Extremities: Warm to touch. No clubbing or cyanosis. No pedal edema. Right BKA with VAC dressing  Pulses: Decreased peripheral pulses LLE  Skin: Venous stasis changes left lower extremity  Lymph Nodes: Cervical, supraclavicular and axillary nodes normal  Neurological: Left sided weakness left > arm. Left facial droop. A and O x 3  Psychiatry: Appropriate mood and affect.    LABS:                          7.8    21.40 )-----------( 281      ( 2022 03:55 )             25.6     CBC    WBC  21.40 <==, 19.73 <==, 17.15 <==, 15.11 <==, 11.11 <==, 11.13 <==, 13.12 <==    Hemoglobin  7.8 <<==, 8.3 <<==, 8.4 <<==, 8.9 <<==, 8.6 <<==, 6.9 <<==, 7.1 <<==    Hematocrit  25.6 <==, 27.6 <==, 27.8 <==, 29.0 <==, 29.0 <==, 23.5 <==, 24.0 <==    Platelets  281 <==, 264 <==, 258 <==, 285 <==, 255 <==, 243 <==, 252 <==      144  |  109<H>  |  99<H>  ----------------------------<  180<H>    04-14  4.6   |  21<L>  |  1.82<H>      LYTES    sodium  144 <==, 140 <==, 141 <==, 140 <==, 139 <==, 141 <==, 141 <==    potassium   4.6 <==, 6.8 <==, 5.2 <==, 5.1 <==, 4.7 <==, 4.1 <==, 3.6 <==    chloride  109 <==, 106 <==, 106 <==, 105 <==, 103 <==, 104 <==, 103 <==    carbon dioxide  21 <==, 20 <==, 21 <==, 22 <==, 21 <==, 20 <==, 22 <==    =============================================================================================  RENAL FUNCTION:    Creatinine:   1.82  <<==, 1.59  <<==, 1.75  <<==, 1.87  <<==, 1.78  <<==, 1.73  <<==, 1.82  <<==    BUN:   99 <==, 99 <==, 99 <==, 100 <==, 102 <==, 98 <==, 103 <==    ============================================================================================    calcium   9.0 <==, 8.9 <==, 9.0 <==, 9.0 <==, 9.1 <==, 9.5 <==, 9.1 <==    phos   4.2 <==, 4.6 <==, 3.6 <==, 3.4 <==, 3.3 <==, 3.6 <==, 4.4 <==    mag   2.5 <==, 2.6 <==, 2.4 <==, 2.4 <==, 2.3 <==, 2.4 <==, 2.5 <==    ============================================================================================  PT/INR - ( 2022 03:55 )   PT: 13.1 sec;   INR: 1.13 ratio       PTT - ( 2022 10:13 )  PTT:104.9 sec    ABG - ( 2022 18:43 )  pH: 7.43  /  pCO2: 35    /  pO2: 80    / HCO3: 23    / Base Excess: -0.8  /  SaO2: 99.0      Venous Blood Gas:   @ 22:10  7.40/41/52/25/84.4  VBG Lactate: 1.8    Venous Blood Gas:   @ 22:23  7.40/39/45/24/78.1  VBG Lactate: 2.4    Venous Blood Gas:   @ 22:23  7.40/39/45/24/78.1  VBG Lactate: 2.4    Venous Blood Gas:  04-10 @ 23:19  7.39/42/44/25/72.2  VBG Lactate: 2.5    < from: TTE with Doppler (w/Cont) (22 @ 15:07) >    Patient name: Martir Heart  YOB: 1935   Age: 86 (M)   MR#: [id number]  Study Date: 4/3/2022  Location: [address] [place], [place] [id number]: Angie Olivarez [facility]  Study quality: Technically difficult  Referring Physician: Anmol Quinn MD  BloodPressure: 115/70 mmHg  Height: 173 cm  Weight: 92 kg  BSA: 2.1 m2  ------------------------------------------------------------------------  PROCEDURE: Transthoracic echocardiogram with 2-D, M-Mode  and complete spectral and color flow Doppler. Verbal  consent was obtained for injection of  Ultrasonic Enhancing  Agent following a discussion of risks and benefits.  Following intravenous injection of Ultrasonic Enhancing  Agent, harmonic imaging was performed.  INDICATION: Cardiomyopathy, unspecified (I42.9)  ------------------------------------------------------------------------  Dimensions:    Normal Values:  LA:     3.1    2.0 - 4.0 cm  Ao:     3.5    2.0 - 3.8 cm  SEPTUM: 1.1    0.6 - 1.2 cm  PWT:    1.3    0.6 - 1.1 cm  LVIDd:  4.3    3.0- 5.6 cm  LVIDs:  3.2    1.8 - 4.0 cm  Derived variables:  LVMI: 90 g/m2  RWT: 0.60  Fractional short: 26 %  EF (Visual Estimate): NWV %  Doppler Peak Velocity (m/sec): MV=1.6 AoV=1.4  ------------------------------------------------------------------------  Observations:  Mitral Valve: Mitral valve not well visualized. Mitral  annular calcification. Peak mitral valve gradient equals 10  mm Hg, mean transmitral valve gradient equals 4 mm Hg,  consistent with mild mitral stenosis.  Aortic Valve/Aorta: Aortic valve not well visualized;  appears calcified. Peak transaortic valve gradient equals 8  mm Hg, mean transaortic valve gradient equals 3 mm Hg,  estimated aortic valve area equals 2 sqcm (by continuity  equation), aortic valve velocity time integral equals 22  cm, consistent with mild aortic stenosis. Peak left  ventricular outflow tract gradient equals 3 mm Hg, mean  gradient is equal to 1 mm Hg, LVOT velocity time integral  equals 12 cm.  Aortic Root: 3.5 cm.  Left Atrium: Normal left atrium.  Left Ventricle: Endocardial visualization enhanced with  intravenous injection of Ultrasonic Enhancing Agent  (Definity). Mild left ventricular systolic dysfunction. The  inferior wall, and the inferoseptum are hypokinetic.  Normal left ventricular internal dimensions and wall  thicknesses. Unable to evaluate diastology.  Right Heart: A device wire is noted in the right heart. The  right ventricle is not well visualized; grossly normal  right ventricular systolic function. Tricuspid valve not  well visualized. Pulmonic valve not well visualized,  probably normal.  Pericardium/Pleura: Normal pericardium with trace  pericardial effusion.  Hemodynamic: Estimated right atrial pressure is 8 mm Hg.  ------------------------------------------------------------------------  Conclusions:  1. Aortic valve not well visualized; appears calcified.  Peak transaortic valve gradient equals 8 mm Hg, mean  transaortic valve gradient equals 3 mm Hg, estimated aortic  valve area equals 2 sqcm (by continuity equation), aortic  valve velocity time integral equals 22 cm, consistent with  mild aortic stenosis.  2. Endocardial visualization enhanced with intravenous  injection of Ultrasonic Enhancing Agent (Definity). Mild  left ventricular systolic dysfunction. The inferior wall,  and the inferoseptum are hypokinetic.  3. The right ventricle is not well visualized; grossly  normal right ventricular systolic function.  ------------------------------------------------------------------------  Confirmed on  4/3/2022 - 17:12:14 by Krupa Nelson M.D.FASE  ------------------------------------------------------------------------  --------------------------------------------------------------------------------------------------------------  ---------------------------------------------------------------------------------------------------------------  MICROBIOLOGY:     COVID-19 PCR . (22 @ 18:23)   COVID-19 PCR: NotDetec:     Urinalysis Basic - ( 2022 06:05 )    Color: Light Yellow / Appearance: Clear / S.021 / pH: x  Gluc: x / Ketone: Negative  / Bili: Negative / Urobili: Negative   Blood: x / Protein: Trace / Nitrite: Negative   Leuk Esterase: Moderate / RBC: 185 /hpf / WBC 12 /HPF   Sq Epi: x / Non Sq Epi: 2 /hpf / Bacteria: Negative    RADIOLOGY:  [x] Chest radiographs reviewed and interpreted by me    EXAM:  XR CHEST PORTABLE ROUTINE 1V                          PROCEDURE DATE:  2022      FINDINGS:    Support devices: A pacemaker overlies left chest wall with its leads   intact.    Cardiac/mediastinum/hilum: Heart size cannot be accurately assessed on   this projection.    Lung parenchyma/Pleura: Bilateral lower lung hazy opacities, unchanged.   There is no pneumothorax.    Skeleton/soft tissues: No acute osseous abnormalities.    IMPRESSION:    Unchanged bilateral lower lung hazy opacities may represent atelectasis   versus infection.    EDITH HER MD; Resident Radiologist  This document has been electronically signed.  SATURNINO CHERRY MD; Attending Radiologist  This document has been electronically signed. 2022 10:54AM  ---------------------------------------------------------------------------------------------------------------   EXAM:  CT BRAIN                          PROCEDURE DATE:  2022      FINDINGS:    Prominence of ventricles and subarachnoid spaces, compatible with   atrophy. Periventricular small vessel white matter ischemic changes.   Encephalomalacia and gliosis is appreciated bilaterally compatible with   old regions of infarction, noted in a high left posterior frontal   location, right posterior parietal/occipital location, and right   cerebellar location. Old ischemic event is also appreciated along the   anterior limb of the internal capsule on the left and along the external   capsular region on the right.    There is prominence of the bifrontal extra-axial regions, suggestive of   bifrontal subdural hygromas, more prominent on the left than the   right-stable compared with prior. Prominent deposition of calcified   plaque within the bilateral carotid siphons, as on prior.    Deposition of calcium appreciated within the bilateral basal ganglia, as   on prior.    No acute intracranial hemorrhage. No intraparenchymal mass lesion, mass   effect, or midline shift.    Appearance of the bilateral optic lenses suggests the patient has   previously undergone prior cataract surgery.    Imaged paranasal sinuses, bilateral mastoid air cells, middle ear   cavities are clear.    IMPRESSION:  1. No acute intracranial hemorrhage.    2. 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 No evidence of an acute ischemic event.    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    DAWN BEHR-VENTURA MD; Attending Radiologist  This document has been electronically signed. 2022  8:25PM  ---------------------------------------------------------------------------------------------------------------   EXAM:  XR CHEST PORTABLE ROUTINE 1V                          PROCEDURE DATE:  2022      FINDINGS:    Support devices: A pacemaker overlies the left chest wall with its leads   intact.    Cardiac/mediastinum/hilum: Heart size cannot be accurately assessed on   this projection.    Lung parenchyma/Pleura: Right lower lung hazy opacities. Bibasilar   atelectasis. Trace left pleural effusion, unchanged. There is no   pneumothorax.    Skeleton/soft tissues: No acute osseous abnormalities.    IMPRESSION:    Right lower lung hazy opacities, which may represent atelectasis versus   infection.    Unchanged trace left pleural effusion.    EDITH HER MD; Resident Radiologist  This document has been electronically signed.  EVON MAN MD; Attending Radiologist  This document has been electronically signed. 2022  5:11PM  ---------------------------------------------------------------------------------------------------------------  EXAM:  XR CHEST PORTABLE ROUTINE 1V                          PROCEDURE DATE:  04/10/2022      FINDINGS:  Left pacemaker with leads in the right atrium and ventricle.    The heart size is not accurately assessed on this projection.  Bilateral lower lung field patchy opacities, left greater than right.  There is no pneumothorax. Trace left pleural effusion.    IMPRESSION:  Bilateral patchy opacities, left greater than right, differential   includes atelectasis or infection.    JAYDON MONTEMAYOR MD; Resident Radiologist  This document has been electronically signed.  SATURNINO CHERRY MD; Attending Radiologist  This document has been electronically signed. Apr 10 2022  9:14AM  --------------------------------------------------------------------------------------------  EXAM:  XR CHEST PORTABLE URGENT 1V                          PROCEDURE DATE:  2022      FINDINGS:  Left chest wall dual-lead pacemaker. Rotated exam limits assessment for   lead tip.    Small left pleural effusion with adjacent opacity. No pneumothorax.    Cardiac size cannot accurately be assessed in this projection.  Aortic   calcifications.      IMPRESSION: Small left pleural effusion with adjacent atelectasis   obscuring evaluation of the underlying lung.    DRE SANCHEZ MD; Resident Radiology  This document has been electronically signed.  WAGNER LAM MD; Attending Radiologist  This document has been electronically signed. 2022  5:54PM  ---------------------------------------------------------------------------------------------------------------  EXAM:  XR CHEST PORTABLE URGENT 1V                          PROCEDURE DATE:  2022      FINDINGS:  Left chest wall dual-lead pacemaker.  The heart is normal in size.  The lungs are clear.  There is no pneumothorax or pleural effusion.    IMPRESSION:  Clear lungs.    KENA CARRINGTON MD; Resident Radiologist  This document has been electronically signed.  SATURNINO CHERRY MD; Attending Radiologist  This document has been electronically signed. 2022 11:40AM  ---------------------------------------------------------------------------------------------------------------  EXAM:  XR CHEST PORTABLE URGENT 1V                          PROCEDURE DATE:  2022      FINDINGS:    Appropriate course of dual-chamber pacemaker. Unremarkable   cardiomediastinal silhouette. Minimal left basilar atelectasis. No   pleural effusion or pneumothorax.      IMPRESSION:    Mild left basilar atelectasis.    JOSEPH GAMINO M.D., ATTENDING RADIOGIST  This document has been electronically signed. Apr  3 2022  9:00AM  ---------------------------------------------------------------------------------------------------------------

## 2022-04-14 NOTE — PROGRESS NOTE ADULT - ASSESSMENT
86 year old gentleman with a PMH DM, HTN, HLD who has been followed for the past 6 months for foot wounds and leg pain.      EKG -  A sense V paced PVC's  Echo - Mild LV dysfunction mild aortic stenosis    1) Post op assessment   -pt has h/o moderate LV dysfunction as per echo 2017, no chest pains 2d echo now shows mild LV dysfunction  -12 lead EKG ok,  PPM interrogated  -s/p BKA , pt wheezing b/l f/u pulm recs now on steroids,  consider CT chest non con     2) HTN  -controlled  -c/w metoprolol  -continue to monitor BP    3) Chronic systolic CHF   - hold metolazone   -monitor fluid status closesly     4) ?Atrial fib   - coumadin on hold on IV heparin     5) KARLOS  - held losartan and metolazone  - renal function improving

## 2022-04-14 NOTE — EEG REPORT - NS EEG TEXT BOX
*** [facility] ***   COMPREHENSIVE EPILEPSY CENTER   REPORT OF ROUTINE VIDEO EEG     [facility]: [address], [address], [place] [address], Ph#: [phone]  [facility]: [address], [place] [address], Ph#: [phone]  [facility]: [address] [place] [address], Ph#: [phone]    Patient Name: LIBRA PEÑA  Age and : 87y (35)  MRN #: [id number]  Location: [place] [address]  Referring Physician: Anmol Quinn    Study Date: 22    _____________________________________________________________  TECHNICAL INFORMATION    Placement and Labeling of Electrodes:  The EEG was performed utilizing 20 channels referential EEG connections (coronal over temporal over parasagittal montage) using all standard 10-20 electrode placements with EKG.  Recording was at a sampling rate of 256 samples per second per channel.  Time synchronized digital video recording was done simultaneously with EEG recording.  A low light infrared camera was used for low light recording.  Son and seizure detection algorithms were utilized.    _____________________________________________________________  HISTORY    Patient is a 87y old  Male who presents with a chief complaint of Preoperative Planning for Right above knee amputation (2022 10:30)      PERTINENT MEDICATION:  gabapentin 300 milliGRAM(s) Oral every 8 hours    _____________________________________________________________  STUDY INTERPRETATION    Findings: The background was continuous and reactive. During wakefulness, the posterior dominant rhythm consisted of symmetric, poorly-modulated 6.5Hz activity.    Background Slowing:  Background predominantly consisted of theta, delta and faster activities.    Focal Slowing:   None were present.    Sleep Background:  Drowsiness was characterized by fragmentation, attenuation, and slowing of the background activity.    Stage II sleep transients were not recorded.    Other Non-Epileptiform Findings:  None were present.    Interictal Epileptiform Activity:   None were present.    Events:  Clinical events: None recorded.  Seizures: None recorded.    Activation Procedures:   Hyperventilation was not performed.    Photic stimulation was performed and did not elicit any abnormality.     Artifacts:  Intermittent myogenic and movement artifacts were noted.    ECG:  The heart rate on single channel ECG was predominantly between 60-80 BPM.    _____________________________________________________________  **EEG SUMMARY/CLASSIFICATION**    Abnormal EEG in an encephalopathic patient.     - Background slowing, generalized.  __________________________________________________________  **EEG IMPRESSION/CLINICAL CORRELATE**    Abnormal EEG study.    - Mild to moderate nonspecific diffuse or multifocal cerebral dysfunction.   - No epileptiform patterns or seizures recorded.  _____________________________________________________________    Justin Ray MD, MARIO  Fellow | [facility] [place] *** [facility] ***   COMPREHENSIVE EPILEPSY CENTER   REPORT OF ROUTINE VIDEO EEG     [facility]: [address], [place] [address], Ph#: [phone]  [facility]: [address], [place] [address], Ph#: [phone]  [facility]: [address] [place] [address], Ph#: [phone]    Patient Name: LIBRA PEÑA  Age and : 87y (35)  MRN #: [id number]  Location: [place] [address]  Referring Physician: Anmol Quinn    Study Date: 22    _____________________________________________________________  TECHNICAL INFORMATION    Placement and Labeling of Electrodes:  The EEG was performed utilizing 20 channels referential EEG connections (coronal over temporal over parasagittal montage) using all standard 10-20 electrode placements with EKG.  Recording was at a sampling rate of 256 samples per second per channel.  Time synchronized digital video recording was done simultaneously with EEG recording.  A low light infrared camera was used for low light recording.  Son and seizure detection algorithms were utilized.    _____________________________________________________________  HISTORY    Patient is a 87y old  Male who presents with a chief complaint of Preoperative Planning for Right above knee amputation (2022 10:30)      PERTINENT MEDICATION:  gabapentin 300 milliGRAM(s) Oral every 8 hours    _____________________________________________________________  STUDY INTERPRETATION    Findings: The background was continuous and reactive. During wakefulness, the posterior dominant rhythm consisted of symmetric, poorly-modulated 6.5Hz activity.    Background Slowing:  Background predominantly consisted of theta, delta and faster activities.    Focal Slowing:   None were present.    Sleep Background:  Drowsiness was characterized by fragmentation, attenuation, and slowing of the background activity.    Stage II sleep transients were not recorded.    Other Non-Epileptiform Findings:  None were present.    Interictal Epileptiform Activity:   None were present.    Events:  Clinical events: None recorded.  Seizures: None recorded.    Activation Procedures:   Hyperventilation was not performed.    Photic stimulation was performed and did not elicit any abnormality.     Artifacts:  Intermittent myogenic and movement artifacts were noted.    ECG:  The heart rate on single channel ECG was predominantly between 60-80 BPM.    _____________________________________________________________  **EEG SUMMARY/CLASSIFICATION**    Abnormal EEG in an encephalopathic patient.     - Background slowing, generalized.  __________________________________________________________  **EEG IMPRESSION/CLINICAL CORRELATE**    Abnormal EEG study.    - Mild to moderate nonspecific diffuse or multifocal cerebral dysfunction.   - No epileptiform patterns or seizures recorded.  _____________________________________________________________    Justin Ray MD, MARIO  Fellow | [facility]    Palomo Phipps MD  EEG/Epilepsy Attending

## 2022-04-14 NOTE — PROGRESS NOTE ADULT - ASSESSMENT
86y male with PMHx of HTN, HLD, DM2 and nonhealing wounds of RLE who is non-ambulatory at home now s/p right guillotine BELOW knee amputation. Postoperative course c/b severe COPD exacerbation requiring excalation of O2 supplementation with HFNC. SICU consulted for close respiratory monitoring in the setting of COPD exacerbation. Improved saturation and work of breathing, now on room air.     PLAN:  - Continue hep gtt, goal aPTT 59-99  - Continue IV abx: Rocephin  - Pain control  - Regular diet  - Glucose control  - No plan for AKA on this admission.  - Wean off steriods  - Wean off insulin gtt, appreciate endo recs  - Pt can go to the floor once off insulin gtt   - Appreciate excellent SICU care    Vascular Surgery  900              - Appreciate excellent SICU care    Vascular Surgery  9002       86y male with PMHx of HTN, HLD, DM2 and nonhealing wounds of RLE who is non-ambulatory at home now s/p right guillotine BELOW knee amputation. Postoperative course c/b severe COPD exacerbation requiring excalation of O2 supplementation with HFNC. SICU consulted for close respiratory monitoring in the setting of COPD exacerbation. Improved saturation and work of breathing, now on room air.     PLAN:  - Continue hep gtt, goal aPTT 59-99  - Continue IV abx: Rocephin  - Pain control  - Regular diet  - Glucose control  - No plan for AKA on this admission.  - Wean off steriods  - Appreciate excellent SICU care    Vascular Surgery  9008              - Appreciate excellent SICU care    Vascular Surgery  900       86M PMH HTN, HLD, DM2 and nonhealing wounds of RLE who is non-ambulatory at home now s/p right guillotine BELOW knee amputation. Postoperative course c/b severe COPD exacerbation requiring excalation of O2 supplementation with HFNC. SICU consulted for close respiratory monitoring in the setting of COPD exacerbation. Overnight attempted weaning of insulin gtt but unsuccessful. Concern for CVA however CT negative. Blood cx sent, zosyn started.     - Continue hep gtt, goal aPTT 59-99  - zosyn  - Pain control  - Regular diet  - Glucose control  - No plan for AKA on this admission.  - Wean off steriods  - Appreciate excellent SICU care    Vascular Surgery  9007              - Appreciate excellent SICU care    Vascular Surgery  9002

## 2022-04-14 NOTE — PROGRESS NOTE ADULT - SUBJECTIVE AND OBJECTIVE BOX
DIABETES FOLLOW UP : Seen earlier today    INTERVAL HX:received MTP 20mg in evening x1, now on prednisone 50mg po qd, received a total of 40 units lantus (30 units at 1600 and 10 units at 2300) as well as admelog in attempt to transition off insulin gtt overnight, then w/ hyperglycemia to 300s over night, received humulin R, and eventual restarting of insulin gtt this am per team.  per d/w team would like to c/w insulin gtt for another 24 hours and reassess ability to wean to subq insulin tomorrow , pt also having eeg set up at bedside , had facial droop and CTH this am. per RN at bedside pt tolerating po      Review of Systems:  General: As above  Cardiovascular: No chest pain  Respiratory: No SOB  GI: No nausea, vomiting  Endocrine: no  S&Sx of hypoglycemia    Allergies    No Known Allergies    Intolerances      MEDICATIONS  (STANDING):  acetaminophen     Tablet .. 975 milliGRAM(s) Oral every 8 hours  albuterol/ipratropium for Nebulization 3 milliLiter(s) Nebulizer every 6 hours  atorvastatin 40 milliGRAM(s) Oral at bedtime  buDESOnide    Inhalation Suspension 0.5 milliGRAM(s) Inhalation every 12 hours  chlorhexidine 2% Cloths 1 Application(s) Topical <User Schedule>  finasteride 5 milliGRAM(s) Oral daily  gabapentin 300 milliGRAM(s) Oral every 8 hours  guaiFENesin ER 1200 milliGRAM(s) Oral every 12 hours  heparin  Infusion 1200 Unit(s)/Hr (19 mL/Hr) IV Continuous <Continuous>  insulin regular Infusion 3 Unit(s)/Hr (3 mL/Hr) IV Continuous <Continuous>  levothyroxine 25 MICROGram(s) Oral daily  metoprolol succinate ER 25 milliGRAM(s) Oral daily  montelukast 10 milliGRAM(s) Oral daily  multivitamin/minerals 1 Tablet(s) Oral daily  pantoprazole    Tablet 40 milliGRAM(s) Oral before breakfast  piperacillin/tazobactam IVPB.. 3.375 Gram(s) IV Intermittent every 8 hours  polyethylene glycol 3350 17 Gram(s) Oral daily  predniSONE   Tablet 50 milliGRAM(s) Oral daily  senna 2 Tablet(s) Oral at bedtime  tamsulosin 0.8 milliGRAM(s) Oral at bedtime      PHYSICAL EXAM:  VITALS: T(C): 37 (04-14-22 @ 12:00)  T(F): 98.6 (04-14-22 @ 12:00), Max: 98.6 (04-14-22 @ 12:00)  HR: 76 (04-14-22 @ 14:00) (69 - 99)  BP: 146/66 (04-14-22 @ 14:00) (125/58 - 170/77)  RR:  (10 - 35)  SpO2:  (88% - 100%)  Wt(kg): --  GENERAL: male laying in bed in NAD  Abdomen: Soft, nontender, non distended  Extremities: Warm, R BKA immobilizer present  NEURO: Alert appropriate    LABS:  POCT Blood Glucose.: 174 mg/dL (04-14-22 @ 14:02)  POCT Blood Glucose.: 122 mg/dL (04-14-22 @ 12:57)  POCT Blood Glucose.: 181 mg/dL (04-14-22 @ 12:04)  POCT Blood Glucose.: 258 mg/dL (04-14-22 @ 10:55)  POCT Blood Glucose.: 251 mg/dL (04-14-22 @ 10:00)  POCT Blood Glucose.: 258 mg/dL (04-14-22 @ 08:59)  POCT Blood Glucose.: 302 mg/dL (04-14-22 @ 08:11)  POCT Blood Glucose.: 282 mg/dL (04-14-22 @ 06:51)  POCT Blood Glucose.: 311 mg/dL (04-14-22 @ 05:24)  POCT Blood Glucose.: 331 mg/dL (04-13-22 @ 21:46)  POCT Blood Glucose.: 149 mg/dL (04-13-22 @ 17:15)  POCT Blood Glucose.: 150 mg/dL (04-13-22 @ 16:06)  POCT Blood Glucose.: 164 mg/dL (04-13-22 @ 15:02)  POCT Blood Glucose.: 123 mg/dL (04-13-22 @ 13:56)  POCT Blood Glucose.: 120 mg/dL (04-13-22 @ 12:59)  POCT Blood Glucose.: 130 mg/dL (04-13-22 @ 11:57)  POCT Blood Glucose.: 151 mg/dL (04-13-22 @ 10:54)  POCT Blood Glucose.: 153 mg/dL (04-13-22 @ 10:01)  POCT Blood Glucose.: 135 mg/dL (04-13-22 @ 09:02)  POCT Blood Glucose.: 130 mg/dL (04-13-22 @ 07:57)  POCT Blood Glucose.: 142 mg/dL (04-13-22 @ 06:56)  POCT Blood Glucose.: 103 mg/dL (04-13-22 @ 06:03)  POCT Blood Glucose.: 132 mg/dL (04-13-22 @ 04:59)  POCT Blood Glucose.: 155 mg/dL (04-13-22 @ 04:03)  POCT Blood Glucose.: 162 mg/dL (04-13-22 @ 02:55)  POCT Blood Glucose.: 148 mg/dL (04-13-22 @ 01:58)  POCT Blood Glucose.: 146 mg/dL (04-13-22 @ 00:55)  POCT Blood Glucose.: 144 mg/dL (04-12-22 @ 23:58)  POCT Blood Glucose.: 109 mg/dL (04-12-22 @ 23:04)  POCT Blood Glucose.: 126 mg/dL (04-12-22 @ 21:59)  POCT Blood Glucose.: 140 mg/dL (04-12-22 @ 20:57)  POCT Blood Glucose.: 139 mg/dL (04-12-22 @ 20:01)  POCT Blood Glucose.: 183 mg/dL (04-12-22 @ 19:04)  POCT Blood Glucose.: 174 mg/dL (04-12-22 @ 18:18)  POCT Blood Glucose.: 148 mg/dL (04-12-22 @ 17:14)  POCT Blood Glucose.: 145 mg/dL (04-12-22 @ 16:12)  POCT Blood Glucose.: 207 mg/dL (04-12-22 @ 15:09)  POCT Blood Glucose.: 232 mg/dL (04-12-22 @ 14:13)  POCT Blood Glucose.: 235 mg/dL (04-12-22 @ 13:20)  POCT Blood Glucose.: 196 mg/dL (04-12-22 @ 12:12)  POCT Blood Glucose.: 175 mg/dL (04-12-22 @ 11:12)  POCT Blood Glucose.: 237 mg/dL (04-12-22 @ 10:10)  POCT Blood Glucose.: 179 mg/dL (04-12-22 @ 09:08)  POCT Blood Glucose.: 160 mg/dL (04-12-22 @ 07:58)  POCT Blood Glucose.: 171 mg/dL (04-12-22 @ 06:52)  POCT Blood Glucose.: 110 mg/dL (04-12-22 @ 05:57)  POCT Blood Glucose.: 135 mg/dL (04-12-22 @ 04:59)  POCT Blood Glucose.: 146 mg/dL (04-12-22 @ 03:58)  POCT Blood Glucose.: 172 mg/dL (04-12-22 @ 02:59)  POCT Blood Glucose.: 201 mg/dL (04-12-22 @ 01:53)  POCT Blood Glucose.: 231 mg/dL (04-12-22 @ 00:57)  POCT Blood Glucose.: 256 mg/dL (04-12-22 @ 00:07)  POCT Blood Glucose.: 293 mg/dL (04-11-22 @ 23:03)  POCT Blood Glucose.: 342 mg/dL (04-11-22 @ 21:57)  POCT Blood Glucose.: 393 mg/dL (04-11-22 @ 20:59)  POCT Blood Glucose.: 385 mg/dL (04-11-22 @ 19:56)  POCT Blood Glucose.: 330 mg/dL (04-11-22 @ 19:11)  POCT Blood Glucose.: 286 mg/dL (04-11-22 @ 18:09)  POCT Blood Glucose.: 259 mg/dL (04-11-22 @ 17:20)  POCT Blood Glucose.: 183 mg/dL (04-11-22 @ 16:20)  POCT Blood Glucose.: 181 mg/dL (04-11-22 @ 14:55)                            7.8    21.40 )-----------( 281      ( 14 Apr 2022 03:55 )             25.6       04-14    140  |  106  |  99<H>  ----------------------------<  129<H>  6.8<HH>   |  20<L>  |  1.59<H>    Ca    8.9      14 Apr 2022 12:56  Phos  4.6     04-14  Mg     2.6     04-14        eGFR: 42 mL/min/1.73m2 (14 Apr 2022 12:56)  eGFR: 37 mL/min/1.73m2 (14 Apr 2022 03:55)          Thyroid Function Tests:          A1C with Estimated Average Glucose Result: 6.8 % (04-02-22 @ 12:21)      Estimated Average Glucose: 148 mg/dL (04-02-22 @ 12:21)                         DIABETES FOLLOW UP : Seen earlier today    INTERVAL HX:was on MTP 20mg q8h last dose received MTP 20mg  x1 4/13 2100, now on prednisone 50mg po qd, received a total of 40 units lantus (30 units at 1600 and 10 units at 2300) as well as admelog in attempt to transition off insulin gtt overnight, then w/ hyperglycemia to 300s over night, received humulin R, and eventual restarting of insulin gtt this am per team.  per d/w team would like to c/w insulin gtt for another 24 hours and reassess ability to wean to subq insulin tomorrow , pt also having eeg set up at bedside , had facial droop and CTH this am. per RN at bedside pt tolerating po      Review of Systems:  General: As above  Cardiovascular: No chest pain  Respiratory: No SOB  GI: No nausea, vomiting  Endocrine: no  S&Sx of hypoglycemia    Allergies    No Known Allergies    Intolerances      MEDICATIONS  (STANDING):  acetaminophen     Tablet .. 975 milliGRAM(s) Oral every 8 hours  albuterol/ipratropium for Nebulization 3 milliLiter(s) Nebulizer every 6 hours  atorvastatin 40 milliGRAM(s) Oral at bedtime  buDESOnide    Inhalation Suspension 0.5 milliGRAM(s) Inhalation every 12 hours  chlorhexidine 2% Cloths 1 Application(s) Topical <User Schedule>  finasteride 5 milliGRAM(s) Oral daily  gabapentin 300 milliGRAM(s) Oral every 8 hours  guaiFENesin ER 1200 milliGRAM(s) Oral every 12 hours  heparin  Infusion 1200 Unit(s)/Hr (19 mL/Hr) IV Continuous <Continuous>  insulin regular Infusion 3 Unit(s)/Hr (3 mL/Hr) IV Continuous <Continuous>  levothyroxine 25 MICROGram(s) Oral daily  metoprolol succinate ER 25 milliGRAM(s) Oral daily  montelukast 10 milliGRAM(s) Oral daily  multivitamin/minerals 1 Tablet(s) Oral daily  pantoprazole    Tablet 40 milliGRAM(s) Oral before breakfast  piperacillin/tazobactam IVPB.. 3.375 Gram(s) IV Intermittent every 8 hours  polyethylene glycol 3350 17 Gram(s) Oral daily  predniSONE   Tablet 50 milliGRAM(s) Oral daily  senna 2 Tablet(s) Oral at bedtime  tamsulosin 0.8 milliGRAM(s) Oral at bedtime      PHYSICAL EXAM:  VITALS: T(C): 37 (04-14-22 @ 12:00)  T(F): 98.6 (04-14-22 @ 12:00), Max: 98.6 (04-14-22 @ 12:00)  HR: 76 (04-14-22 @ 14:00) (69 - 99)  BP: 146/66 (04-14-22 @ 14:00) (125/58 - 170/77)  RR:  (10 - 35)  SpO2:  (88% - 100%)  Wt(kg): --  GENERAL: male laying in bed in NAD  Abdomen: Soft, nontender, non distended  Extremities: Warm, R BKA immobilizer present  NEURO: Alert appropriate    LABS:  POCT Blood Glucose.: 174 mg/dL (04-14-22 @ 14:02)  POCT Blood Glucose.: 122 mg/dL (04-14-22 @ 12:57)  POCT Blood Glucose.: 181 mg/dL (04-14-22 @ 12:04)  POCT Blood Glucose.: 258 mg/dL (04-14-22 @ 10:55)  POCT Blood Glucose.: 251 mg/dL (04-14-22 @ 10:00)  POCT Blood Glucose.: 258 mg/dL (04-14-22 @ 08:59)  POCT Blood Glucose.: 302 mg/dL (04-14-22 @ 08:11)  POCT Blood Glucose.: 282 mg/dL (04-14-22 @ 06:51)  POCT Blood Glucose.: 311 mg/dL (04-14-22 @ 05:24)  POCT Blood Glucose.: 331 mg/dL (04-13-22 @ 21:46)  POCT Blood Glucose.: 149 mg/dL (04-13-22 @ 17:15)  POCT Blood Glucose.: 150 mg/dL (04-13-22 @ 16:06)  POCT Blood Glucose.: 164 mg/dL (04-13-22 @ 15:02)  POCT Blood Glucose.: 123 mg/dL (04-13-22 @ 13:56)  POCT Blood Glucose.: 120 mg/dL (04-13-22 @ 12:59)  POCT Blood Glucose.: 130 mg/dL (04-13-22 @ 11:57)  POCT Blood Glucose.: 151 mg/dL (04-13-22 @ 10:54)  POCT Blood Glucose.: 153 mg/dL (04-13-22 @ 10:01)  POCT Blood Glucose.: 135 mg/dL (04-13-22 @ 09:02)  POCT Blood Glucose.: 130 mg/dL (04-13-22 @ 07:57)  POCT Blood Glucose.: 142 mg/dL (04-13-22 @ 06:56)  POCT Blood Glucose.: 103 mg/dL (04-13-22 @ 06:03)  POCT Blood Glucose.: 132 mg/dL (04-13-22 @ 04:59)  POCT Blood Glucose.: 155 mg/dL (04-13-22 @ 04:03)  POCT Blood Glucose.: 162 mg/dL (04-13-22 @ 02:55)  POCT Blood Glucose.: 148 mg/dL (04-13-22 @ 01:58)  POCT Blood Glucose.: 146 mg/dL (04-13-22 @ 00:55)  POCT Blood Glucose.: 144 mg/dL (04-12-22 @ 23:58)  POCT Blood Glucose.: 109 mg/dL (04-12-22 @ 23:04)  POCT Blood Glucose.: 126 mg/dL (04-12-22 @ 21:59)  POCT Blood Glucose.: 140 mg/dL (04-12-22 @ 20:57)  POCT Blood Glucose.: 139 mg/dL (04-12-22 @ 20:01)  POCT Blood Glucose.: 183 mg/dL (04-12-22 @ 19:04)  POCT Blood Glucose.: 174 mg/dL (04-12-22 @ 18:18)  POCT Blood Glucose.: 148 mg/dL (04-12-22 @ 17:14)  POCT Blood Glucose.: 145 mg/dL (04-12-22 @ 16:12)  POCT Blood Glucose.: 207 mg/dL (04-12-22 @ 15:09)  POCT Blood Glucose.: 232 mg/dL (04-12-22 @ 14:13)  POCT Blood Glucose.: 235 mg/dL (04-12-22 @ 13:20)  POCT Blood Glucose.: 196 mg/dL (04-12-22 @ 12:12)  POCT Blood Glucose.: 175 mg/dL (04-12-22 @ 11:12)  POCT Blood Glucose.: 237 mg/dL (04-12-22 @ 10:10)  POCT Blood Glucose.: 179 mg/dL (04-12-22 @ 09:08)  POCT Blood Glucose.: 160 mg/dL (04-12-22 @ 07:58)  POCT Blood Glucose.: 171 mg/dL (04-12-22 @ 06:52)  POCT Blood Glucose.: 110 mg/dL (04-12-22 @ 05:57)  POCT Blood Glucose.: 135 mg/dL (04-12-22 @ 04:59)  POCT Blood Glucose.: 146 mg/dL (04-12-22 @ 03:58)  POCT Blood Glucose.: 172 mg/dL (04-12-22 @ 02:59)  POCT Blood Glucose.: 201 mg/dL (04-12-22 @ 01:53)  POCT Blood Glucose.: 231 mg/dL (04-12-22 @ 00:57)  POCT Blood Glucose.: 256 mg/dL (04-12-22 @ 00:07)  POCT Blood Glucose.: 293 mg/dL (04-11-22 @ 23:03)  POCT Blood Glucose.: 342 mg/dL (04-11-22 @ 21:57)  POCT Blood Glucose.: 393 mg/dL (04-11-22 @ 20:59)  POCT Blood Glucose.: 385 mg/dL (04-11-22 @ 19:56)  POCT Blood Glucose.: 330 mg/dL (04-11-22 @ 19:11)  POCT Blood Glucose.: 286 mg/dL (04-11-22 @ 18:09)  POCT Blood Glucose.: 259 mg/dL (04-11-22 @ 17:20)  POCT Blood Glucose.: 183 mg/dL (04-11-22 @ 16:20)  POCT Blood Glucose.: 181 mg/dL (04-11-22 @ 14:55)                            7.8    21.40 )-----------( 281      ( 14 Apr 2022 03:55 )             25.6       04-14    140  |  106  |  99<H>  ----------------------------<  129<H>  6.8<HH>   |  20<L>  |  1.59<H>    Ca    8.9      14 Apr 2022 12:56  Phos  4.6     04-14  Mg     2.6     04-14        eGFR: 42 mL/min/1.73m2 (14 Apr 2022 12:56)  eGFR: 37 mL/min/1.73m2 (14 Apr 2022 03:55)          Thyroid Function Tests:          A1C with Estimated Average Glucose Result: 6.8 % (04-02-22 @ 12:21)      Estimated Average Glucose: 148 mg/dL (04-02-22 @ 12:21)

## 2022-04-14 NOTE — PROGRESS NOTE ADULT - ASSESSMENT
86M with PMH of COPD (not on home o2), prior smoking history (40 pack years), HTN, HLD, Afib, CHF, T2DM, CVA, BPH, and PAD admitted for elective RLE AKA. Renal consulted for CKD Mx.    KARLOS on CKD 3,   baseline Cr ~1.7  serum k stable  rise likely 2/2 hemodynamic changes--> ATN  c/w hudson-- good urine output  cont monitor Serum Creatinine   eventual diuretics  off losartan   monitor BMP daily and u/o   dose all meds for eGFR  avoid NSAIDs/Nephrotoxics.    HTN, controlled. bp optimal   continue  BB. monitor bp    Rt LE nonhealing wound:  s/p amputation  follow up with vascular    Hyperkalemia  would give lokelma 10 grams po x 1  trend k  Dr Knight  [phone]

## 2022-04-14 NOTE — PROGRESS NOTE ADULT - SUBJECTIVE AND OBJECTIVE BOX
Patient is a 87y old  Male who presents with a chief complaint of Preoperative Planning for Right above knee amputation (2022 14:21)      SUBJECTIVE / OVERNIGHT EVENTS:    MEDICATIONS  (STANDING):  acetaminophen     Tablet .. 975 milliGRAM(s) Oral every 8 hours  albuterol/ipratropium for Nebulization 3 milliLiter(s) Nebulizer every 6 hours  atorvastatin 40 milliGRAM(s) Oral at bedtime  buDESOnide    Inhalation Suspension 0.5 milliGRAM(s) Inhalation every 12 hours  chlorhexidine 2% Cloths 1 Application(s) Topical <User Schedule>  finasteride 5 milliGRAM(s) Oral daily  gabapentin 300 milliGRAM(s) Oral every 8 hours  guaiFENesin ER 1200 milliGRAM(s) Oral every 12 hours  heparin  Infusion 1200 Unit(s)/Hr (19 mL/Hr) IV Continuous <Continuous>  insulin regular Infusion 3 Unit(s)/Hr (3 mL/Hr) IV Continuous <Continuous>  levothyroxine 25 MICROGram(s) Oral daily  metoprolol succinate ER 25 milliGRAM(s) Oral daily  montelukast 10 milliGRAM(s) Oral daily  multivitamin/minerals 1 Tablet(s) Oral daily  pantoprazole    Tablet 40 milliGRAM(s) Oral before breakfast  piperacillin/tazobactam IVPB.. 3.375 Gram(s) IV Intermittent every 8 hours  polyethylene glycol 3350 17 Gram(s) Oral daily  predniSONE   Tablet   Oral   predniSONE   Tablet 50 milliGRAM(s) Oral daily  senna 2 Tablet(s) Oral at bedtime  tamsulosin 0.8 milliGRAM(s) Oral at bedtime    MEDICATIONS  (PRN):  oxyCODONE    IR 10 milliGRAM(s) Oral every 4 hours PRN Severe Pain (7 - 10)  oxyCODONE    IR 5 milliGRAM(s) Oral every 4 hours PRN Moderate Pain (4 - 6)      Vital Signs Last 24 Hrs  T(F): 98.8 (22 @ 16:00), Max: 98.8 (22 @ 16:00)  HR: 75 (22 @ 18:00) (65 - 99)  BP: 154/68 (22 @ 18:00) (125/58 - 161/69)  RR: 24 (22 @ 18:00) (10 - 35)  SpO2: 91% (22 @ 18:00) (90% - 100%)  Telemetry:   CAPILLARY BLOOD GLUCOSE      POCT Blood Glucose.: 131 mg/dL (2022 18:03)  POCT Blood Glucose.: 149 mg/dL (2022 17:06)  POCT Blood Glucose.: 138 mg/dL (2022 16:06)  POCT Blood Glucose.: 153 mg/dL (2022 15:13)  POCT Blood Glucose.: 174 mg/dL (2022 14:02)  POCT Blood Glucose.: 122 mg/dL (2022 12:57)  POCT Blood Glucose.: 181 mg/dL (2022 12:04)  POCT Blood Glucose.: 258 mg/dL (2022 10:55)  POCT Blood Glucose.: 251 mg/dL (2022 10:00)  POCT Blood Glucose.: 258 mg/dL (2022 08:59)  POCT Blood Glucose.: 302 mg/dL (2022 08:11)  POCT Blood Glucose.: 282 mg/dL (2022 06:51)  POCT Blood Glucose.: 311 mg/dL (2022 05:24)  POCT Blood Glucose.: 331 mg/dL (2022 21:46)    I&O's Summary    2022 07:01  -  2022 07:00  --------------------------------------------------------  IN: 1455 mL / OUT: 2565 mL / NET: -1110 mL    2022 07:01  -  2022 18:24  --------------------------------------------------------  IN: 788 mL / OUT: 920 mL / NET: -132 mL        PHYSICAL EXAM:  GENERAL: NAD, well-developed  HEAD:  Atraumatic, Normocephalic  EYES: EOMI, PERRLA, conjunctiva and sclera clear  NECK: Supple, No JVD  CHEST/LUNG: Clear to auscultation bilaterally; No wheeze  HEART: Regular rate and rhythm; No murmurs, rubs, or gallops  ABDOMEN: Soft, Nontender, Nondistended; Bowel sounds present  EXTREMITIES:  2+ Peripheral Pulses, No clubbing, cyanosis, or edema  PSYCH: AAOx3  NEUROLOGY: non-focal  SKIN: No rashes or lesions    LABS:                        7.8    21.40 )-----------( 281      ( 2022 03:55 )             25.6     04-14    144  |  109<H>  |  99<H>  ----------------------------<  180<H>  4.6   |  21<L>  |  1.82<H>    Ca    9.0      2022 14:08  Phos  4.2     04-14  Mg     2.5     04-14      PT/INR - ( 2022 03:55 )   PT: 13.1 sec;   INR: 1.13 ratio         PTT - ( 2022 10:13 )  PTT:104.9 sec      Urinalysis Basic - ( 2022 06:05 )    Color: Light Yellow / Appearance: Clear / S.021 / pH: x  Gluc: x / Ketone: Negative  / Bili: Negative / Urobili: Negative   Blood: x / Protein: Trace / Nitrite: Negative   Leuk Esterase: Moderate / RBC: 185 /hpf / WBC 12 /HPF   Sq Epi: x / Non Sq Epi: 2 /hpf / Bacteria: Negative        RADIOLOGY & ADDITIONAL TESTS:    Imaging Personally Reviewed:    Consultant(s) Notes Reviewed:      Care Discussed with Consultants/Other Providers:

## 2022-04-14 NOTE — PROGRESS NOTE ADULT - SUBJECTIVE AND OBJECTIVE BOX
Vascular Surgery Progress Note    INTERVAL EVENTS:   No acute events overnight.    SUBJECTIVE: Patient seen and examined at bedside with surgical team,    OBJECTIVE:    Vital Signs Last 24 Hrs  T(C): 36.2 (14 Apr 2022 00:00), Max: 36.7 (13 Apr 2022 16:00)  T(F): 97.1 (14 Apr 2022 00:00), Max: 98 (13 Apr 2022 16:00)  HR: 99 (14 Apr 2022 00:00) (67 - 99)  BP: 144/65 (14 Apr 2022 00:00) (134/72 - 179/79)  BP(mean): 94 (14 Apr 2022 00:00) (88 - 113)  RR: 14 (14 Apr 2022 00:00) (10 - 30)  SpO2: 93% (14 Apr 2022 00:00) (88% - 100%)I&O's Detail    12 Apr 2022 07:01  -  13 Apr 2022 07:00  --------------------------------------------------------  IN:    Heparin: 400 mL    Insulin: 167 mL    IV PiggyBack: 50 mL    Oral Fluid: 1150 mL  Total IN: 1767 mL    OUT:    Indwelling Catheter - Urethral (mL): 2045 mL    Stool (mL): 2 mL  Total OUT: 2047 mL    Total NET: -280 mL      13 Apr 2022 07:01  -  14 Apr 2022 03:05  --------------------------------------------------------  IN:    Heparin: 326 mL    Insulin: 43 mL    IV PiggyBack: 50 mL    Oral Fluid: 950 mL  Total IN: 1369 mL    OUT:    Indwelling Catheter - Urethral (mL): 640 mL    Intermittent Catheterization - Urethral (mL): 1150 mL  Total OUT: 1790 mL    Total NET: -421 mL      MEDICATIONS  (STANDING):  acetaminophen     Tablet .. 975 milliGRAM(s) Oral every 8 hours  albuterol/ipratropium for Nebulization 3 milliLiter(s) Nebulizer every 6 hours  atorvastatin 40 milliGRAM(s) Oral at bedtime  buDESOnide    Inhalation Suspension 0.5 milliGRAM(s) Inhalation every 12 hours  cefTRIAXone   IVPB 1000 milliGRAM(s) IV Intermittent every 24 hours  chlorhexidine 2% Cloths 1 Application(s) Topical <User Schedule>  finasteride 5 milliGRAM(s) Oral daily  gabapentin 300 milliGRAM(s) Oral every 8 hours  guaiFENesin ER 1200 milliGRAM(s) Oral every 12 hours  heparin  Infusion 1200 Unit(s)/Hr (20 mL/Hr) IV Continuous <Continuous>  insulin glargine Injectable (LANTUS) 40 Unit(s) SubCutaneous at bedtime  insulin lispro (ADMELOG) corrective regimen sliding scale   SubCutaneous three times a day before meals  insulin lispro (ADMELOG) corrective regimen sliding scale   SubCutaneous at bedtime  insulin lispro Injectable (ADMELOG) 12 Unit(s) SubCutaneous three times a day before meals  levothyroxine 25 MICROGram(s) Oral daily  metoprolol succinate ER 25 milliGRAM(s) Oral daily  montelukast 10 milliGRAM(s) Oral daily  multivitamin/minerals 1 Tablet(s) Oral daily  pantoprazole    Tablet 40 milliGRAM(s) Oral before breakfast  polyethylene glycol 3350 17 Gram(s) Oral daily  predniSONE   Tablet 50 milliGRAM(s) Oral daily  senna 2 Tablet(s) Oral at bedtime  tamsulosin 0.8 milliGRAM(s) Oral at bedtime    MEDICATIONS  (PRN):  oxyCODONE    IR 5 milliGRAM(s) Oral every 4 hours PRN Moderate Pain (4 - 6)  oxyCODONE    IR 10 milliGRAM(s) Oral every 4 hours PRN Severe Pain (7 - 10)      PHYSICAL EXAM:  Constitutional:   Respiratory:   Abdomen:   Extremities:    LABS:                        8.3    19.73 )-----------( 264      ( 12 Apr 2022 22:24 )             27.6     04-12    140  |  105  |  100<H>  ----------------------------<  129<H>  5.1   |  22  |  1.87<H>    Ca    9.0      12 Apr 2022 22:24  Phos  3.6     04-12  Mg     2.4     04-12      PT/INR - ( 12 Apr 2022 22:24 )   PT: 11.6 sec;   INR: 1.01 ratio         PTT - ( 13 Apr 2022 22:00 )  PTT:51.8 sec          IMAGING:     Vascular Surgery Progress Note    INTERVAL EVENTS:   Episode of left side face drop, normal CT head. Off insulin drip.     SUBJECTIVE: Patient seen and examined at bedside with surgical team,    OBJECTIVE:    Vital Signs Last 24 Hrs  T(C): 36.2 (14 Apr 2022 00:00), Max: 36.7 (13 Apr 2022 16:00)  T(F): 97.1 (14 Apr 2022 00:00), Max: 98 (13 Apr 2022 16:00)  HR: 99 (14 Apr 2022 00:00) (67 - 99)  BP: 144/65 (14 Apr 2022 00:00) (134/72 - 179/79)  BP(mean): 94 (14 Apr 2022 00:00) (88 - 113)  RR: 14 (14 Apr 2022 00:00) (10 - 30)  SpO2: 93% (14 Apr 2022 00:00) (88% - 100%)I&O's Detail    12 Apr 2022 07:01  -  13 Apr 2022 07:00  --------------------------------------------------------  IN:    Heparin: 400 mL    Insulin: 167 mL    IV PiggyBack: 50 mL    Oral Fluid: 1150 mL  Total IN: 1767 mL    OUT:    Indwelling Catheter - Urethral (mL): 2045 mL    Stool (mL): 2 mL  Total OUT: 2047 mL    Total NET: -280 mL      13 Apr 2022 07:01  -  14 Apr 2022 03:05  --------------------------------------------------------  IN:    Heparin: 326 mL    Insulin: 43 mL    IV PiggyBack: 50 mL    Oral Fluid: 950 mL  Total IN: 1369 mL    OUT:    Indwelling Catheter - Urethral (mL): 640 mL    Intermittent Catheterization - Urethral (mL): 1150 mL  Total OUT: 1790 mL    Total NET: -421 mL      MEDICATIONS  (STANDING):  acetaminophen     Tablet .. 975 milliGRAM(s) Oral every 8 hours  albuterol/ipratropium for Nebulization 3 milliLiter(s) Nebulizer every 6 hours  atorvastatin 40 milliGRAM(s) Oral at bedtime  buDESOnide    Inhalation Suspension 0.5 milliGRAM(s) Inhalation every 12 hours  cefTRIAXone   IVPB 1000 milliGRAM(s) IV Intermittent every 24 hours  chlorhexidine 2% Cloths 1 Application(s) Topical <User Schedule>  finasteride 5 milliGRAM(s) Oral daily  gabapentin 300 milliGRAM(s) Oral every 8 hours  guaiFENesin ER 1200 milliGRAM(s) Oral every 12 hours  heparin  Infusion 1200 Unit(s)/Hr (20 mL/Hr) IV Continuous <Continuous>  insulin glargine Injectable (LANTUS) 40 Unit(s) SubCutaneous at bedtime  insulin lispro (ADMELOG) corrective regimen sliding scale   SubCutaneous three times a day before meals  insulin lispro (ADMELOG) corrective regimen sliding scale   SubCutaneous at bedtime  insulin lispro Injectable (ADMELOG) 12 Unit(s) SubCutaneous three times a day before meals  levothyroxine 25 MICROGram(s) Oral daily  metoprolol succinate ER 25 milliGRAM(s) Oral daily  montelukast 10 milliGRAM(s) Oral daily  multivitamin/minerals 1 Tablet(s) Oral daily  pantoprazole    Tablet 40 milliGRAM(s) Oral before breakfast  polyethylene glycol 3350 17 Gram(s) Oral daily  predniSONE   Tablet 50 milliGRAM(s) Oral daily  senna 2 Tablet(s) Oral at bedtime  tamsulosin 0.8 milliGRAM(s) Oral at bedtime    MEDICATIONS  (PRN):  oxyCODONE    IR 5 milliGRAM(s) Oral every 4 hours PRN Moderate Pain (4 - 6)  oxyCODONE    IR 10 milliGRAM(s) Oral every 4 hours PRN Severe Pain (7 - 10)      PHYSICAL EXAM:  Constitutional:   Respiratory:   Abdomen:   Extremities:    LABS:                        8.3    19.73 )-----------( 264      ( 12 Apr 2022 22:24 )             27.6     04-12    140  |  105  |  100<H>  ----------------------------<  129<H>  5.1   |  22  |  1.87<H>    Ca    9.0      12 Apr 2022 22:24  Phos  3.6     04-12  Mg     2.4     04-12      PT/INR - ( 12 Apr 2022 22:24 )   PT: 11.6 sec;   INR: 1.01 ratio         PTT - ( 13 Apr 2022 22:00 )  PTT:51.8 sec          IMAGING:     Vascular Surgery Progress Note    INTERVAL EVENTS:   Episode of left side face drop, normal CT head. Off insulin drip.     SUBJECTIVE: Patient seen and examined at bedside with surgical team, denies any new complaints.    OBJECTIVE:    Vital Signs Last 24 Hrs  T(C): 36.2 (14 Apr 2022 00:00), Max: 36.7 (13 Apr 2022 16:00)  T(F): 97.1 (14 Apr 2022 00:00), Max: 98 (13 Apr 2022 16:00)  HR: 99 (14 Apr 2022 00:00) (67 - 99)  BP: 144/65 (14 Apr 2022 00:00) (134/72 - 179/79)  BP(mean): 94 (14 Apr 2022 00:00) (88 - 113)  RR: 14 (14 Apr 2022 00:00) (10 - 30)  SpO2: 93% (14 Apr 2022 00:00) (88% - 100%)I&O's Detail    12 Apr 2022 07:01  -  13 Apr 2022 07:00  --------------------------------------------------------  IN:    Heparin: 400 mL    Insulin: 167 mL    IV PiggyBack: 50 mL    Oral Fluid: 1150 mL  Total IN: 1767 mL    OUT:    Indwelling Catheter - Urethral (mL): 2045 mL    Stool (mL): 2 mL  Total OUT: 2047 mL    Total NET: -280 mL      13 Apr 2022 07:01  -  14 Apr 2022 03:05  --------------------------------------------------------  IN:    Heparin: 326 mL    Insulin: 43 mL    IV PiggyBack: 50 mL    Oral Fluid: 950 mL  Total IN: 1369 mL    OUT:    Indwelling Catheter - Urethral (mL): 640 mL    Intermittent Catheterization - Urethral (mL): 1150 mL  Total OUT: 1790 mL    Total NET: -421 mL      MEDICATIONS  (STANDING):  acetaminophen     Tablet .. 975 milliGRAM(s) Oral every 8 hours  albuterol/ipratropium for Nebulization 3 milliLiter(s) Nebulizer every 6 hours  atorvastatin 40 milliGRAM(s) Oral at bedtime  buDESOnide    Inhalation Suspension 0.5 milliGRAM(s) Inhalation every 12 hours  cefTRIAXone   IVPB 1000 milliGRAM(s) IV Intermittent every 24 hours  chlorhexidine 2% Cloths 1 Application(s) Topical <User Schedule>  finasteride 5 milliGRAM(s) Oral daily  gabapentin 300 milliGRAM(s) Oral every 8 hours  guaiFENesin ER 1200 milliGRAM(s) Oral every 12 hours  heparin  Infusion 1200 Unit(s)/Hr (20 mL/Hr) IV Continuous <Continuous>  insulin glargine Injectable (LANTUS) 40 Unit(s) SubCutaneous at bedtime  insulin lispro (ADMELOG) corrective regimen sliding scale   SubCutaneous three times a day before meals  insulin lispro (ADMELOG) corrective regimen sliding scale   SubCutaneous at bedtime  insulin lispro Injectable (ADMELOG) 12 Unit(s) SubCutaneous three times a day before meals  levothyroxine 25 MICROGram(s) Oral daily  metoprolol succinate ER 25 milliGRAM(s) Oral daily  montelukast 10 milliGRAM(s) Oral daily  multivitamin/minerals 1 Tablet(s) Oral daily  pantoprazole    Tablet 40 milliGRAM(s) Oral before breakfast  polyethylene glycol 3350 17 Gram(s) Oral daily  predniSONE   Tablet 50 milliGRAM(s) Oral daily  senna 2 Tablet(s) Oral at bedtime  tamsulosin 0.8 milliGRAM(s) Oral at bedtime    MEDICATIONS  (PRN):  oxyCODONE    IR 5 milliGRAM(s) Oral every 4 hours PRN Moderate Pain (4 - 6)  oxyCODONE    IR 10 milliGRAM(s) Oral every 4 hours PRN Severe Pain (7 - 10)      PHYSICAL EXAM:  Constitutional: NAD  Respiratory: breathing comfortably on 1L NC  Abdomen: soft, nd, nttp, no g/r  Extremities: RLE ace wrap in place w/o strikethrough. Both legs in boots.     LABS:                        8.3    19.73 )-----------( 264      ( 12 Apr 2022 22:24 )             27.6     04-12    140  |  105  |  100<H>  ----------------------------<  129<H>  5.1   |  22  |  1.87<H>    Ca    9.0      12 Apr 2022 22:24  Phos  3.6     04-12  Mg     2.4     04-12      PT/INR - ( 12 Apr 2022 22:24 )   PT: 11.6 sec;   INR: 1.01 ratio         PTT - ( 13 Apr 2022 22:00 )  PTT:51.8 sec          IMAGING:

## 2022-04-14 NOTE — PROGRESS NOTE ADULT - SUBJECTIVE AND OBJECTIVE BOX
Patient seen and examined  no complaints    No Known Allergies    Hospital Medications:   MEDICATIONS  (STANDING):  acetaminophen     Tablet .. 975 milliGRAM(s) Oral every 8 hours  albuterol/ipratropium for Nebulization 3 milliLiter(s) Nebulizer every 6 hours  atorvastatin 40 milliGRAM(s) Oral at bedtime  buDESOnide    Inhalation Suspension 0.5 milliGRAM(s) Inhalation every 12 hours  chlorhexidine 2% Cloths 1 Application(s) Topical <User Schedule>  finasteride 5 milliGRAM(s) Oral daily  gabapentin 300 milliGRAM(s) Oral every 8 hours  guaiFENesin ER 1200 milliGRAM(s) Oral every 12 hours  heparin  Infusion 1200 Unit(s)/Hr (20 mL/Hr) IV Continuous <Continuous>  insulin regular Infusion 3 Unit(s)/Hr (3 mL/Hr) IV Continuous <Continuous>  levothyroxine 25 MICROGram(s) Oral daily  metoprolol succinate ER 25 milliGRAM(s) Oral daily  montelukast 10 milliGRAM(s) Oral daily  multivitamin/minerals 1 Tablet(s) Oral daily  pantoprazole    Tablet 40 milliGRAM(s) Oral before breakfast  piperacillin/tazobactam IVPB.. 3.375 Gram(s) IV Intermittent every 8 hours  polyethylene glycol 3350 17 Gram(s) Oral daily  predniSONE   Tablet 50 milliGRAM(s) Oral daily  senna 2 Tablet(s) Oral at bedtime  tamsulosin 0.8 milliGRAM(s) Oral at bedtime      VITALS:  T(F): 98.1 (22 @ 08:00), Max: 98.1 (22 @ 08:00)  HR: 79 (22 @ 09:00)  BP: 151/70 (22 @ 09:00)  RR: 19 (22 @ 09:00)  SpO2: 92% (22 @ 09:00)  Wt(kg): --     @ 07:01  -   @ 07:00  --------------------------------------------------------  IN: 1455 mL / OUT: 2565 mL / NET: -1110 mL     @ 07:01  -   @ 09:28  --------------------------------------------------------  IN: 148 mL / OUT: 175 mL / NET: -27 mL        Physical Exam :-  Constitutional: NAD  Respiratory: Bilateral equal breath sounds, no Crackles present.  Cardiovascular: S1, S2 normal, positive Murmur  Gastrointestinal: Bowel Sounds present, soft, non tender.  Extremities: + Edema Feet left lower ext ; right bka- with wound vac  Neurological: Alert and Oriented x 3  Psychiatric: Normal mood, normal affect    LABS:      141  |  106  |  99<H>  ----------------------------<  326<H>  5.2   |  21<L>  |  1.75<H>    Ca    9.0      2022 03:55  Phos  4.6       Mg     2.6           Creatinine Trend: 1.75 <--, 1.87 <--, 1.78 <--, 1.73 <--, 1.82 <--, 2.24 <--, 2.44 <--, 2.28 <--, 2.25 <--                        7.8    21.40 )-----------( 281      ( 2022 03:55 )             25.6     Urine Studies:  Urinalysis Basic - ( 2022 06:05 )    Color: Light Yellow / Appearance: Clear / S.021 / pH:   Gluc:  / Ketone: Negative  / Bili: Negative / Urobili: Negative   Blood:  / Protein: Trace / Nitrite: Negative   Leuk Esterase: Moderate / RBC: 185 /hpf / WBC 12 /HPF   Sq Epi:  / Non Sq Epi: 2 /hpf / Bacteria: Negative        RADIOLOGY & ADDITIONAL STUDIES:

## 2022-04-14 NOTE — PROGRESS NOTE ADULT - ASSESSMENT
ASSESSMENT:    86 year old gentleman, former smoker, followed by Dr. Alicja Rob of our practice for asthma/COPD overlap  syndrome and obstructive sleep apnea being treated conservatively. He has no history of TOM colonization/infection as listed in the "past medical history". He has been stable from a pulmonary perspective and maintained on budesonide and duoneb once daily and if needed. He also has a history of HTN, HLD, DM, CKD, CVA, CHF (mild systolic dysfunction) and atrial fibrillation with sick sinus syndrome s/p pacemaker implantation. He has been followed for many months for chronic foot wounds and leg pain now with some purulence and gangrene. The pain is exacerbated when laying in bed and improves with tylenol and when hanging the legs off of the bed. He is no longer able to ambulate. 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 The patient has developed marked shortness of breath with severe hypoxemia currently requiring a nasal canula @ 5lpm to maintain saturation @ 90%. He has a cough with copious mucous in his chest which he is unable to expectorate. He has chest congestion and wheeze. No fevers, chills or sweats. No chest pain/pressure or palpitations. Called by the patient's family and asked to be involved with his pulmonary care.    acute hypoxic respiratory failure -> resolved    1) severe COPD exacerbation -> slowly improving  2) 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 limiting diaphragmatic excursion and respiratory muscle weakness decreasing chest wall expansion -> bibasilar atelectasis  3) no evidence of pulmonary edema or pneumonia on the most recent CXR    leukocytosis more likely related to systemic steroids than infection    steroid induced hyperglycemia    CKD/KARLOS due to diuresis in the setting of euvolemia -> improved    PLAN/RECOMMENDATIONS:    stable oxygenation on room air  EEG -> mild to moderate nonspecific diffuse or multifocal cerebral dysfunction -  no epileptiform patterns or seizures recorded.  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, stable in appearance compared with prior dated 9/8/2019  started on zosyn for "sepsis" given increasing leukocytosis - await blood and urine cultures  no further diuresis  prednisone 50mg daily - taper by 10mg every 2 days - glucose control on steroids  albuterol/atrovent nebs q4h holding 2am dose  pulmicort 0.5mg nebs q12h - give after duoneb - rinse mouth after use  discontinue mucomyst and hypertonic saline nebs  mucinex 1200mg 2 times daily  singulair 10mg @ bedtime  acapella device/incentive spirometer  respiratory status continues to improve - his hypoxemia has resolved and bronchospasm has improved - at this point, there is no pulmonary contraindication to the proposed AKA  cardiac meds: lipitor/toprol XL  flomax/proscar  vascular surgery follow-up    heparin gtt    insulin gtt for hyperglycemia - perhaps this can converted to SQ insulin with reduced dose of steroids    pain control  GI prophylaxis - protonix  proscar/flomax  bowel regimen  out of bed and into the chair    Will follow with you. Plan of care discussed with the patient and his family at bedside and with the ICU team.    Sarabjit Wright MD, [facility]  [phone]  Pulmonary Medicine     ASSESSMENT:    86 year old gentleman, former smoker, followed by Dr. Alicja Rob of our practice for asthma/COPD overlap  syndrome and obstructive sleep apnea being treated conservatively. He has no history of TOM colonization/infection as listed in the "past medical history". He has been stable from a pulmonary perspective and maintained on budesonide and duoneb once daily and if needed. He also has a history of HTN, HLD, DM, CKD, CVA, CHF (mild systolic dysfunction) and atrial fibrillation with sick sinus syndrome s/p pacemaker implantation. He has been followed for many months for chronic foot wounds and leg pain now with some purulence and gangrene. The pain is exacerbated when laying in bed and improves with tylenol and when hanging the legs off of the bed. He is no longer able to ambulate. 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 The patient has developed marked shortness of breath with severe hypoxemia currently requiring a nasal canula @ 5lpm to maintain saturation @ 90%. He has a cough with copious mucous in his chest which he is unable to expectorate. He has chest congestion and wheeze. No fevers, chills or sweats. No chest pain/pressure or palpitations. Called by the patient's family and asked to be involved with his pulmonary care.    acute hypoxic respiratory failure -> resolved    1) severe COPD exacerbation -> slowly improving  2) 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 limiting diaphragmatic excursion and respiratory muscle weakness decreasing chest wall expansion -> bibasilar atelectasis  3) no evidence of pulmonary edema or pneumonia on the most recent CXR    leukocytosis more likely related to systemic steroids than infection    steroid induced hyperglycemia    CKD/KARLOS due to diuresis in the setting of euvolemia -> improved    4/14 - remains in the ICU; continues on an insulin infusion for steroid induced hyperglycemia; worsening of his mental status and chronic left sided weakness and left facial droop after analgesics prior to the VAC change yesterday; CT scan and EEG are unremarkable; started on zosyn for possible "sepsis"; now awake and alert sitting in the chair and back to his baseline mental status; no shortness of breath or hypoxemia on room air; occasional cough productive of scant sputum; minimal chest congestion and wheeze; no fevers, chills or sweats; no chest pain/pressure or palpitations; CXR now has bibasilar atelectasis - there is no evidence of pulmonary edema; on heparin gtt for PAD    PLAN/RECOMMENDATIONS:    stable oxygenation on room air  EEG -> mild to moderate nonspecific diffuse or multifocal cerebral dysfunction -  no epileptiform patterns or seizures recorded.  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, stable in appearance compared with prior dated 9/8/2019  started on zosyn for "sepsis" given increasing leukocytosis - await blood and urine cultures  no further diuresis  prednisone 50mg daily - taper by 10mg every 2 days - glucose control on steroids  albuterol/atrovent nebs q4h holding 2am dose  pulmicort 0.5mg nebs q12h - give after duoneb - rinse mouth after use  discontinue mucomyst and hypertonic saline nebs  mucinex 1200mg 2 times daily  singulair 10mg @ bedtime  acapella device/incentive spirometer  respiratory status continues to improve - his hypoxemia has resolved and bronchospasm has improved - at this point, there is no pulmonary contraindication to the proposed AKA  cardiac meds: lipitor/toprol XL  flomax/proscar  vascular surgery follow-up    heparin gtt    insulin gtt for hyperglycemia - perhaps this can converted to SQ insulin with reduced dose of steroids    pain control  GI prophylaxis - protonix  proscar/flomax  bowel regimen  out of bed and into the chair    Will follow with you. Plan of care discussed with the patient and his family at bedside and with the ICU team.    Sarabjit Wright MD, [facility]  [phone]  Pulmonary Medicine

## 2022-04-14 NOTE — PROGRESS NOTE ADULT - ASSESSMENT
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 likely marked hyperglycemia overnight as pt was on MTP 20mg q8h received last dose 4/13 2100, now transitioned to prednisone 50mg so insulin requirements can be expected to fluctuate.      Problem/Recommendation - 1:  ·  Problem: Uncontrolled type 2 diabetes mellitus with hyperglycemia.   ·  Recommendation: Diabetes Education and Nutrition Eval  Per d/w SICU Team team wishes to continue insulin gtt for another 24 hours before attempting to transition off gtt due to hyperglycemia  -c/w insulin gtt at this time  -On prednisone 50mg po qd   -Endocrine to assist w/ transition to basal bolus based on insulin requirements over next 24 hours   Goal glucose 140-180  Outpt. endo follow-up  Outpt. optho, podiatry, nephrology  Plan to d/c on basal + orals including SGLT2 given CKD. Would avoid metformin and sulfonylurea.     Problem/Recommendation - 2:  ·  Problem: Essential hypertension.   ·  Recommendation: Goal BP <140/90, on metoprolol     Problem/Recommendation - 3:  ·  Problem: Hyperlipidemia.   ·  Recommendation: on statin.     Problem/Recommendation - 4:  ·  Problem: Hypothyroidism.   ·  Recommendation: continue levothyroxine. Repeat TFTs as outpatient.    Discussed with patient and primary  team SICDARRION Toro   Contact via [employer] Teams during business hours  On evenings and weekends, please call [phone] or page endocrine fellow on call.   Please note that this patient may be followed by different provider tomorrow.    Time spent on encounter: 28  minutes spent on total encounter; The necessity of the time spent during the encounter on this date of service was due to development of plan of care/coordination of care/glycemic control through review of labs, blood glucose values and vital signs.  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 likely marked hyperglycemia overnight as pt was on MTP 20mg q8h received last dose 4/13 2100, now transitioned to prednisone 50mg so insulin requirements can be expected to fluctuate.      Problem/Recommendation - 1:  ·  Problem: Uncontrolled type 2 diabetes mellitus with hyperglycemia.   ·  Recommendation: Diabetes Education and Nutrition Eval  -consider changing IVABx to a solution other than D5  Per d/w SICU Team team wishes to continue insulin gtt for another 24 hours before attempting to transition off gtt due to hyperglycemia  -c/w insulin gtt at this time  -On prednisone 50mg po qd   -Endocrine to assist w/ transition to basal bolus based on insulin requirements over next 24 hours   Goal glucose 140-180  Outpt. endo follow-up  Outpt. optho, podiatry, nephrology  Plan to d/c on basal + orals including SGLT2 given CKD. Would avoid metformin and sulfonylurea.     Problem/Recommendation - 2:  ·  Problem: Essential hypertension.   ·  Recommendation: Goal BP <140/90, on metoprolol     Problem/Recommendation - 3:  ·  Problem: Hyperlipidemia.   ·  Recommendation: on statin.     Problem/Recommendation - 4:  ·  Problem: Hypothyroidism.   ·  Recommendation: continue levothyroxine. Repeat TFTs as outpatient.    Discussed with patient and primary  team RICHARD Toro   Contact via [employer] Teams during business hours  On evenings and weekends, please call [phone] or page endocrine fellow on call.   Please note that this patient may be followed by different provider tomorrow.    Time spent on encounter: 28  minutes spent on total encounter; The necessity of the time spent during the encounter on this date of service was due to development of plan of care/coordination of care/glycemic control through review of labs, blood glucose values and vital signs.

## 2022-04-14 NOTE — PROGRESS NOTE ADULT - SUBJECTIVE AND OBJECTIVE BOX
SICU Daily Progress Note  =====================================================  Interval/Overnight Events:     - Transitioned off insulin gtt, started on Admelog 12 and Lantus 30  - Hyperglycemic overnight, extra 10U Lantus and 8U Admelog given  - Left labial facial droop, CTH overnight negative.    HPI: 86y M with Hx DM, HTN, COPD, and HLD who presented with RLE pain (s/p angio 9/2021), found to have wet gangrene s/p R guilyunierine BKA 4/4. Post-op course c/b severe COPD exacerbation requiring HFNC. SICU consulted for respiratory monitoring.     Allergies:   No Known Allergies    MEDICATIONS:   --------------------------------------------------------------------------------------  Neurologic Medications  acetaminophen     Tablet .. 975 milliGRAM(s) Oral every 8 hours  gabapentin 300 milliGRAM(s) Oral every 8 hours  oxyCODONE    IR 5 milliGRAM(s) Oral every 4 hours PRN Moderate Pain (4 - 6)  oxyCODONE    IR 10 milliGRAM(s) Oral every 4 hours PRN Severe Pain (7 - 10)    Respiratory Medications  albuterol/ipratropium for Nebulization 3 milliLiter(s) Nebulizer every 6 hours  buDESOnide    Inhalation Suspension 0.5 milliGRAM(s) Inhalation every 12 hours  guaiFENesin ER 1200 milliGRAM(s) Oral every 12 hours  montelukast 10 milliGRAM(s) Oral daily    Cardiovascular Medications  metoprolol succinate ER 25 milliGRAM(s) Oral daily  tamsulosin 0.8 milliGRAM(s) Oral at bedtime    Gastrointestinal Medications  multivitamin/minerals 1 Tablet(s) Oral daily  pantoprazole    Tablet 40 milliGRAM(s) Oral before breakfast  polyethylene glycol 3350 17 Gram(s) Oral daily  senna 2 Tablet(s) Oral at bedtime    Genitourinary Medications    Hematologic/Oncologic Medications  heparin  Infusion 1200 Unit(s)/Hr IV Continuous <Continuous>    Antimicrobial/Immunologic Medications  cefTRIAXone   IVPB 1000 milliGRAM(s) IV Intermittent every 24 hours    Endocrine/Metabolic Medications  atorvastatin 40 milliGRAM(s) Oral at bedtime  finasteride 5 milliGRAM(s) Oral daily  insulin glargine Injectable (LANTUS) 40 Unit(s) SubCutaneous at bedtime  insulin lispro (ADMELOG) corrective regimen sliding scale   SubCutaneous three times a day before meals  insulin lispro (ADMELOG) corrective regimen sliding scale   SubCutaneous at bedtime  insulin lispro Injectable (ADMELOG) 12 Unit(s) SubCutaneous three times a day before meals  levothyroxine 25 MICROGram(s) Oral daily  predniSONE   Tablet 50 milliGRAM(s) Oral daily    Topical/Other Medications  chlorhexidine 2% Cloths 1 Application(s) Topical <User Schedule>    --------------------------------------------------------------------------------------  VITAL SIGNS, INS/OUTS (last 24 hours):  --------------------------------------------------------------------------------------  T(C): 36.2 (04-14-22 @ 00:00), Max: 36.7 (04-13-22 @ 16:00)  HR: 99 (04-14-22 @ 00:00) (67 - 99)  BP: 144/65 (04-14-22 @ 00:00) (134/72 - 179/79)  RR: 14 (04-14-22 @ 00:00) (10 - 30)  SpO2: 93% (04-14-22 @ 00:00) (88% - 100%)    04-12-22 @ 07:01  -  04-13-22 @ 07:00  --------------------------------------------------------  IN: 1767 mL / OUT: 2047 mL / NET: -280 mL    04-13-22 @ 07:01  -  04-14-22 @ 04:20  --------------------------------------------------------  IN: 1369 mL / OUT: 1790 mL / NET: -421 mL      --------------------------------------------------------------------------------------  EXAM  NEUROLOGY  Exam: Normal, NAD, alert, oriented x3, no focal deficits.    HEENT  Exam: Normocephalic, atraumatic, EOMI.     RESPIRATORY  Exam: Normal expansion/effort.    CARDIOVASCULAR  Exam: Regular rate and rhythm.       GI/NUTRITION  Exam: Abdomen soft, Non-tender, Non-distended.     VASCULAR  Exam: Extremities warm, pink, well-perfused.     MUSCULOSKELETAL  Exam: All extremities moving spontaneously without limitations.     SKIN  Exam: Good skin turgor, no skin breakdown.     LABS  --------------------------------------------------------------------------------------                        7.8    21.40 )-----------( 281      ( 14 Apr 2022 03:55 )             25.6   04-12    140  |  105  |  100<H>  ----------------------------<  129<H>  5.1   |  22  |  1.87<H>    Ca    9.0      12 Apr 2022 22:24  Phos  3.6     04-12  Mg     2.4     04-12    ABG - ( 13 Apr 2022 18:43 )  pH, Arterial: 7.43  pH, Blood: x     /  pCO2: 35    /  pO2: 80    / HCO3: 23    / Base Excess: -0.8  /  SaO2: 99.0            PT/INR - ( 14 Apr 2022 03:55 )   PT: 13.1 sec;   INR: 1.13 ratio         PTT - ( 14 Apr 2022 03:55 )  PTT:81.9 sec  --------------------------------------------------------------------------------------     SICU Daily Progress Note  =====================================================  Interval/Overnight Events:     - Transitioned off insulin gtt, started on Admelog 12 and Lantus 30  - Hyperglycemic overnight, extra 10U Lantus and 8U Admelog given  - Insulin gtt restarted 2/2 persistent hyperglycemia  - Increasing leukocytosis, BCx sent and started on zZosyn  - Left labial facial droop, CTH overnight negative.    HPI: 86y M with Hx DM, HTN, COPD, and HLD who presented with RLE pain (s/p angio 9/2021), found to have wet gangrene s/p R laura JAMES 4/4. Post-op course c/b severe COPD exacerbation requiring HFNC. SICU consulted for respiratory monitoring.     Allergies:   No Known Allergies    MEDICATIONS:   --------------------------------------------------------------------------------------  Neurologic Medications  acetaminophen     Tablet .. 975 milliGRAM(s) Oral every 8 hours  gabapentin 300 milliGRAM(s) Oral every 8 hours  oxyCODONE    IR 5 milliGRAM(s) Oral every 4 hours PRN Moderate Pain (4 - 6)  oxyCODONE    IR 10 milliGRAM(s) Oral every 4 hours PRN Severe Pain (7 - 10)    Respiratory Medications  albuterol/ipratropium for Nebulization 3 milliLiter(s) Nebulizer every 6 hours  buDESOnide    Inhalation Suspension 0.5 milliGRAM(s) Inhalation every 12 hours  guaiFENesin ER 1200 milliGRAM(s) Oral every 12 hours  montelukast 10 milliGRAM(s) Oral daily    Cardiovascular Medications  metoprolol succinate ER 25 milliGRAM(s) Oral daily  tamsulosin 0.8 milliGRAM(s) Oral at bedtime    Gastrointestinal Medications  multivitamin/minerals 1 Tablet(s) Oral daily  pantoprazole    Tablet 40 milliGRAM(s) Oral before breakfast  polyethylene glycol 3350 17 Gram(s) Oral daily  senna 2 Tablet(s) Oral at bedtime    Genitourinary Medications    Hematologic/Oncologic Medications  heparin  Infusion 1200 Unit(s)/Hr IV Continuous <Continuous>    Antimicrobial/Immunologic Medications  cefTRIAXone   IVPB 1000 milliGRAM(s) IV Intermittent every 24 hours    Endocrine/Metabolic Medications  atorvastatin 40 milliGRAM(s) Oral at bedtime  finasteride 5 milliGRAM(s) Oral daily  insulin glargine Injectable (LANTUS) 40 Unit(s) SubCutaneous at bedtime  insulin lispro (ADMELOG) corrective regimen sliding scale   SubCutaneous three times a day before meals  insulin lispro (ADMELOG) corrective regimen sliding scale   SubCutaneous at bedtime  insulin lispro Injectable (ADMELOG) 12 Unit(s) SubCutaneous three times a day before meals  levothyroxine 25 MICROGram(s) Oral daily  predniSONE   Tablet 50 milliGRAM(s) Oral daily    Topical/Other Medications  chlorhexidine 2% Cloths 1 Application(s) Topical <User Schedule>    --------------------------------------------------------------------------------------  VITAL SIGNS, INS/OUTS (last 24 hours):  --------------------------------------------------------------------------------------  T(C): 36.2 (04-14-22 @ 00:00), Max: 36.7 (04-13-22 @ 16:00)  HR: 99 (04-14-22 @ 00:00) (67 - 99)  BP: 144/65 (04-14-22 @ 00:00) (134/72 - 179/79)  RR: 14 (04-14-22 @ 00:00) (10 - 30)  SpO2: 93% (04-14-22 @ 00:00) (88% - 100%)    04-12-22 @ 07:01  -  04-13-22 @ 07:00  --------------------------------------------------------  IN: 1767 mL / OUT: 2047 mL / NET: -280 mL    04-13-22 @ 07:01  -  04-14-22 @ 04:20  --------------------------------------------------------  IN: 1369 mL / OUT: 1790 mL / NET: -421 mL      --------------------------------------------------------------------------------------  EXAM  NEUROLOGY  Exam: Normal, NAD, alert, oriented x3, no focal deficits.    HEENT  Exam: Normocephalic, atraumatic, EOMI.     RESPIRATORY  Exam: Normal expansion/effort.    CARDIOVASCULAR  Exam: Regular rate and rhythm.       GI/NUTRITION  Exam: Abdomen soft, Non-tender, Non-distended.     VASCULAR  Exam: Extremities warm, pink, well-perfused.     MUSCULOSKELETAL  Exam: All extremities moving spontaneously without limitations.     SKIN  Exam: Good skin turgor, no skin breakdown.     LABS  --------------------------------------------------------------------------------------                        7.8    21.40 )-----------( 281      ( 14 Apr 2022 03:55 )             25.6   04-12    140  |  105  |  100<H>  ----------------------------<  129<H>  5.1   |  22  |  1.87<H>    Ca    9.0      12 Apr 2022 22:24  Phos  3.6     04-12  Mg     2.4     04-12    ABG - ( 13 Apr 2022 18:43 )  pH, Arterial: 7.43  pH, Blood: x     /  pCO2: 35    /  pO2: 80    / HCO3: 23    / Base Excess: -0.8  /  SaO2: 99.0            PT/INR - ( 14 Apr 2022 03:55 )   PT: 13.1 sec;   INR: 1.13 ratio         PTT - ( 14 Apr 2022 03:55 )  PTT:81.9 sec  --------------------------------------------------------------------------------------

## 2022-04-14 NOTE — PROGRESS NOTE ADULT - ATTENDING COMMENTS
- On insulin gtt, endocrinology consult.  - Head CT overnight negative.   - Started Zosyn, AMS, cultures pending.

## 2022-04-14 NOTE — CHART NOTE - NSCHARTNOTEFT_GEN_A_CORE
at 7pm patient was noted to have left sided facial droop, otherwise neuro exam at baseline per family (left sided mild weakness), given being on heparin infusion, it was stopped and emergent CT of head was performed which showed no ICH, and no signs of acute ischemic stroke. This is likely recrudescence. if symptoms persist or worsen, will consult neurology.

## 2022-04-14 NOTE — PROGRESS NOTE ADULT - ASSESSMENT
87 yo male ex  smoker, h/o HTN, HLD, DM, CKD, CVA, CHF (mild systolic dysfunction) and atrial fibrillation with sick sinus syndrome s/p pacemaker implantation.  h/o COPD/ZACKARY, TOM colonization/infection, admitted on 4/1 with RLE wet gangrene, s/p R guillotine BKA, and is awaiting a staged right lower extremity AKA. on heparin gtt for PAD  post op course complicated by acute hypoxic respiratory failure 2/2 COPD exacerbation. now resolved  ptn seen in 8ICU  Ptn is now stable on po prednisone 50mg daily - taper by 10mg every 2 days   albuterol/atrovent nebs   pulmicort 0.5mg nebs q12h   mucinex 1200mg 2 times daily  singulair 10mg @ bedtime  acapella device/incentive spirometer  on RA 91-92% pulse Ox  steroid induced hyperglycemia, on insulin drip  placed on Zosyn 2/2 possible sepsis after he was found with worsening mental status   its possible AMS on 4/13 was 2/2 analgesics prior to the VAC change yesterday. today MS back at baseline  CKD3/s/p KARLOS due to diuresis in the setting of euvolemia -> improved, renal following  HTN, systolic CHF, Afib stable, cardiology following

## 2022-04-14 NOTE — PROGRESS NOTE ADULT - ASSESSMENT
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 Post-op course c/b severe COPD exacerbation requiring HFNC. SICU consulted for respiratory monitoring.     PLAN:   Neuro: post-op pain  - Pain control: Tylenol 975mg PO q6hr ATC, Oxycodone 5/10mg q4hr PRN, Gabapentin 300mg PO q8hr  - Multivitamin    Respiratory: COPD exacerbation   - 3L NC  - Monitor respiratory status  - Duonebs, mucinex, pulmicort, montelukast,    Cardiovascular: h/o HTN, HLD, AF  - Monitor vitals signs  - C/w home Metoprolol 25mg PO daily for BP control  - C/w home Atorvastatin 40mg PO HS for HLD    Gastrointestinal: no active issues   - Diet: Regular CC  - Protonix 40mg PO qD  - Bowel regimen: Senna, Miralax    Genitourinary/Renal: h/o urinary retention  - C/w home Flomax and Finasteride  - Monitor UOP, [place] for strict I/O monitoring  - Trend electrolytes on BMP qD, replete PRN    Heme: no active issues   - Hep gtt for AF, monitor PTT qD  - Trend H/H on CBC qD    ID: RLE wet gangrene s/p guilbutch R MAXINEA 4/4  - Abx: Ceftriaxone  - Trend WBC on CBC qD  - Monitor fever curve    Endocrine: h/o DM, hypothyroidism  - Admelog 12U TID and Lantus 40U HS  - Moderate ISS w/ Meals and HS  - Prednisone 50mg PO daily for COPD exacerbation   - C/w home Synthroid 25mcg PO qD    Lines:   - PIV x 2  - Intermittently straight cathed    Code Status: Full Code  Dispo: SICU 86y M with Hx DM, AF, HTN, COPD, and HLD who presented with RLE pain (s/p angio 9/2021), 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 Post-op course c/b severe COPD exacerbation requiring HFNC. SICU consulted for respiratory monitoring.     PLAN:   Neuro: post-op pain  - Pain control: Tylenol 975mg PO q6hr ATC, Oxycodone 5/10mg q4hr PRN, Gabapentin 300mg PO q8hr  - Multivitamin    Respiratory: COPD exacerbation   - 3L NC  - Monitor respiratory status  - Duonebs, mucinex, pulmicort, montelukast,    Cardiovascular: h/o HTN, HLD, AF  - Monitor vitals signs  - C/w home Metoprolol 25mg PO daily for BP control  - C/w home Atorvastatin 40mg PO HS for HLD    Gastrointestinal: no active issues   - Diet: Regular CC  - Protonix 40mg PO qD  - Bowel regimen: Senna, Miralax    Genitourinary/Renal: h/o urinary retention  - C/w home Flomax and Finasteride  - Monitor UOP, [place] for strict I/O monitoring  - Trend electrolytes on BMP qD, replete PRN    Heme: no active issues   - Hep gtt for AF, monitor PTT qD  - Trend H/H on CBC qD    ID: RLE wet gangrene s/p laura R ERIKA 4/4  - Abx: Zosyn 3.375g IV q8hr  - Trend WBC on CBC qD  - Monitor fever curve    Endocrine: h/o DM, hypothyroidism  - Insulin gtt  - Prednisone 50mg PO daily for COPD exacerbation   - C/w home Synthroid 25mcg PO qD    Lines:   - PIV x 2  - Intermittently straight cathed    Code Status: Full Code  Dispo: SICU

## 2022-04-15 NOTE — PROGRESS NOTE ADULT - SUBJECTIVE AND OBJECTIVE BOX
Vascular Surgery Progress Note    INTERVAL EVENTS:   No acute events overnight.    SUBJECTIVE: Patient seen and examined at bedside with surgical team,    OBJECTIVE:    Vital Signs Last 24 Hrs  T(C): 36.8 (2022 22:00), Max: 37.1 (2022 16:00)  T(F): 98.2 (2022 22:00), Max: 98.8 (2022 16:00)  HR: 75 (15 Apr 2022 00:44) (65 - 85)  BP: 176/79 (2022 22:00) (125/58 - 176/79)  BP(mean): 113 (2022 22:00) (83 - 113)  RR: 20 (:00) (13 - 35)  SpO2: 100% (15 Apr 2022 00:44) (90% - 100%)I&O's Detail    2022 07:01  -  2022 07:00  --------------------------------------------------------  IN:    Heparin: 406 mL    Insulin: 43 mL    Insulin: 6 mL    IV PiggyBack: 50 mL    Oral Fluid: 950 mL  Total IN: 1455 mL    OUT:    Indwelling Catheter - Urethral (mL): 715 mL    Intermittent Catheterization - Urethral (mL): 1850 mL  Total OUT: 2565 mL    Total NET: -1110 mL      2022 07:01  -  15 Apr 2022 02:12  --------------------------------------------------------  IN:    Heparin: 271 mL    Insulin: 65 mL    IV PiggyBack: 240 mL    Oral Fluid: 500 mL  Total IN: 1076 mL    OUT:    Indwelling Catheter - Urethral (mL): 1195 mL  Total OUT: 1195 mL    Total NET: -119 mL      MEDICATIONS  (STANDING):  acetaminophen     Tablet .. 975 milliGRAM(s) Oral every 8 hours  albuterol/ipratropium for Nebulization 3 milliLiter(s) Nebulizer every 6 hours  atorvastatin 40 milliGRAM(s) Oral at bedtime  buDESOnide    Inhalation Suspension 0.5 milliGRAM(s) Inhalation every 12 hours  chlorhexidine 2% Cloths 1 Application(s) Topical <User Schedule>  finasteride 5 milliGRAM(s) Oral daily  gabapentin 300 milliGRAM(s) Oral every 8 hours  guaiFENesin ER 1200 milliGRAM(s) Oral every 12 hours  heparin  Infusion 1800 Unit(s)/Hr (18 mL/Hr) IV Continuous <Continuous>  insulin regular Infusion 3 Unit(s)/Hr (3 mL/Hr) IV Continuous <Continuous>  levothyroxine 25 MICROGram(s) Oral daily  metoprolol succinate ER 25 milliGRAM(s) Oral daily  montelukast 10 milliGRAM(s) Oral daily  multivitamin/minerals 1 Tablet(s) Oral daily  pantoprazole    Tablet 40 milliGRAM(s) Oral before breakfast  piperacillin/tazobactam IVPB.. 3.375 Gram(s) IV Intermittent every 8 hours  polyethylene glycol 3350 17 Gram(s) Oral daily  predniSONE   Tablet   Oral   predniSONE   Tablet 50 milliGRAM(s) Oral daily  senna 2 Tablet(s) Oral at bedtime  tamsulosin 0.8 milliGRAM(s) Oral at bedtime    MEDICATIONS  (PRN):  oxyCODONE    IR 10 milliGRAM(s) Oral every 4 hours PRN Severe Pain (7 - 10)  oxyCODONE    IR 5 milliGRAM(s) Oral every 4 hours PRN Moderate Pain (4 - 6)      PHYSICAL EXAM:  Constitutional: A&Ox3, NAD  Respiratory: Unlabored breathing  Abdomen:   Extremities:     LABS:                        7.9    20.97 )-----------( 320      ( 15 Apr 2022 01:11 )             25.4     04-15    145  |  112<H>  |  96<H>  ----------------------------<  111<H>  5.0   |  19<L>  |  1.87<H>    Ca    9.1      15 Apr 2022 01:11  Phos  4.4     04-15  Mg     2.6     04-15      PT/INR - ( 15 Apr 2022 01:11 )   PT: 12.6 sec;   INR: 1.09 ratio         PTT - ( 15 Apr 2022 01:11 )  PTT:104.9 sec    Urinalysis Basic - ( 2022 06:05 )    Color: Light Yellow / Appearance: Clear / S.021 / pH: x  Gluc: x / Ketone: Negative  / Bili: Negative / Urobili: Negative   Blood: x / Protein: Trace / Nitrite: Negative   Leuk Esterase: Moderate / RBC: 185 /hpf / WBC 12 /HPF   Sq Epi: x / Non Sq Epi: 2 /hpf / Bacteria: Negative          IMAGING:     Vascular Surgery Progress Note    INTERVAL EVENTS:   No acute events overnight.  - d/c insulin gtt, now on ISS and lantus pre meal    SUBJECTIVE: Patient seen and examined at bedside with surgical team,    OBJECTIVE:    Vital Signs Last 24 Hrs  T(C): 36.8 (2022 22:00), Max: 37.1 (2022 16:00)  T(F): 98.2 (2022 22:00), Max: 98.8 (2022 16:00)  HR: 75 (15 Apr 2022 00:44) (65 - 85)  BP: 176/79 (2022 22:00) (125/58 - 176/79)  BP(mean): 113 (:00) (83 - 113)  RR: 20 (2022 22:00) (13 - 35)  SpO2: 100% (15 Apr 2022 00:44) (90% - 100%)I&O's Detail    2022 07:01  -  2022 07:00  --------------------------------------------------------  IN:    Heparin: 406 mL    Insulin: 43 mL    Insulin: 6 mL    IV PiggyBack: 50 mL    Oral Fluid: 950 mL  Total IN: 1455 mL    OUT:    Indwelling Catheter - Urethral (mL): 715 mL    Intermittent Catheterization - Urethral (mL): 1850 mL  Total OUT: 2565 mL    Total NET: -1110 mL      2022 07:01  -  15 Apr 2022 02:12  --------------------------------------------------------  IN:    Heparin: 271 mL    Insulin: 65 mL    IV PiggyBack: 240 mL    Oral Fluid: 500 mL  Total IN: 1076 mL    OUT:    Indwelling Catheter - Urethral (mL): 1195 mL  Total OUT: 1195 mL    Total NET: -119 mL      MEDICATIONS  (STANDING):  acetaminophen     Tablet .. 975 milliGRAM(s) Oral every 8 hours  albuterol/ipratropium for Nebulization 3 milliLiter(s) Nebulizer every 6 hours  atorvastatin 40 milliGRAM(s) Oral at bedtime  buDESOnide    Inhalation Suspension 0.5 milliGRAM(s) Inhalation every 12 hours  chlorhexidine 2% Cloths 1 Application(s) Topical <User Schedule>  finasteride 5 milliGRAM(s) Oral daily  gabapentin 300 milliGRAM(s) Oral every 8 hours  guaiFENesin ER 1200 milliGRAM(s) Oral every 12 hours  heparin  Infusion 1800 Unit(s)/Hr (18 mL/Hr) IV Continuous <Continuous>  insulin regular Infusion 3 Unit(s)/Hr (3 mL/Hr) IV Continuous <Continuous>  levothyroxine 25 MICROGram(s) Oral daily  metoprolol succinate ER 25 milliGRAM(s) Oral daily  montelukast 10 milliGRAM(s) Oral daily  multivitamin/minerals 1 Tablet(s) Oral daily  pantoprazole    Tablet 40 milliGRAM(s) Oral before breakfast  piperacillin/tazobactam IVPB.. 3.375 Gram(s) IV Intermittent every 8 hours  polyethylene glycol 3350 17 Gram(s) Oral daily  predniSONE   Tablet   Oral   predniSONE   Tablet 50 milliGRAM(s) Oral daily  senna 2 Tablet(s) Oral at bedtime  tamsulosin 0.8 milliGRAM(s) Oral at bedtime    MEDICATIONS  (PRN):  oxyCODONE    IR 10 milliGRAM(s) Oral every 4 hours PRN Severe Pain (7 - 10)  oxyCODONE    IR 5 milliGRAM(s) Oral every 4 hours PRN Moderate Pain (4 - 6)      PHYSICAL EXAM:  Constitutional: A&Ox3, NAD  Respiratory: Unlabored breathing  Abdomen: soft, nd, nttp, no g/r  Extremities: RLE ace wrap in place w/o strikethrough. Both legs in boots. Vac holding suction.    LABS:                        7.9    20.97 )-----------( 320      ( 15 Apr 2022 01:11 )             25.4     04-15    145  |  112<H>  |  96<H>  ----------------------------<  111<H>  5.0   |  19<L>  |  1.87<H>    Ca    9.1      15 Apr 2022 01:11  Phos  4.4     04-15  Mg     2.6     04-15      PT/INR - ( 15 Apr 2022 01:11 )   PT: 12.6 sec;   INR: 1.09 ratio         PTT - ( 15 Apr 2022 01:11 )  PTT:104.9 sec    Urinalysis Basic - ( 2022 06:05 )    Color: Light Yellow / Appearance: Clear / S.021 / pH: x  Gluc: x / Ketone: Negative  / Bili: Negative / Urobili: Negative   Blood: x / Protein: Trace / Nitrite: Negative   Leuk Esterase: Moderate / RBC: 185 /hpf / WBC 12 /HPF   Sq Epi: x / Non Sq Epi: 2 /hpf / Bacteria: Negative          IMAGING:

## 2022-04-15 NOTE — PROGRESS NOTE ADULT - ASSESSMENT
86 year old gentleman with a PMH DM, HTN, HLD who has been followed for the past 6 months for foot wounds and leg pain.      EKG -  A sense V paced PVC's  Echo - Mild LV dysfunction mild aortic stenosis    1) Post op assessment   -pt has h/o moderate LV dysfunction as per echo 2017, no chest pains 2d echo now shows mild LV dysfunction  -12 lead EKG ok,  PPM interrogated  -s/p BKA , pt wheezing b/l f/u pulm recs now on steroids,       2) HTN  -controlled  -c/w metoprolol  -continue to monitor BP    3) Chronic systolic CHF   - hold metolazone   -monitor fluid status closesly     4) ?Atrial fib   - coumadin on hold on IV heparin     5) KARLOS  - held losartan and metolazone  - renal function improving

## 2022-04-15 NOTE — PROGRESS NOTE ADULT - ASSESSMENT
86M with PMH of COPD (not on home o2), prior smoking history (40 pack years), HTN, HLD, Afib, CHF, T2DM, CVA, BPH, and PAD admitted for elective RLE AKA. Renal consulted for CKD Mx.    KARLOS on CKD 3,   baseline Cr ~1.7  serum k stable  rise likely 2/2 hemodynamic changes--> ATN  c/w hudson-- good urine output  cont monitor Serum Creatinine   will start lasix 40mg iv qd for now given lower ext edema  off losartan   monitor BMP daily and u/o   dose all meds for eGFR  avoid NSAIDs/Nephrotoxics.    HTN, controlled. bp optimal   continue  BB. monitor bp    Rt LE nonhealing wound:  s/p amputation  follow up with vascular    Hyperkalemia  s/p lokelma  k better  trend k      Dr Knight  [phone]

## 2022-04-15 NOTE — PROGRESS NOTE ADULT - SUBJECTIVE AND OBJECTIVE BOX
Patient seen and examined  no complaints    No Known Allergies    Hospital Medications:   MEDICATIONS  (STANDING):  acetaminophen     Tablet .. 975 milliGRAM(s) Oral every 8 hours  albuterol/ipratropium for Nebulization 3 milliLiter(s) Nebulizer every 6 hours  atorvastatin 40 milliGRAM(s) Oral at bedtime  buDESOnide    Inhalation Suspension 0.5 milliGRAM(s) Inhalation every 12 hours  chlorhexidine 2% Cloths 1 Application(s) Topical <User Schedule>  finasteride 5 milliGRAM(s) Oral daily  gabapentin 300 milliGRAM(s) Oral every 8 hours  guaiFENesin ER 1200 milliGRAM(s) Oral every 12 hours  heparin  Infusion 1500 Unit(s)/Hr (15 mL/Hr) IV Continuous <Continuous>  insulin glargine Injectable (LANTUS) 40 Unit(s) SubCutaneous at bedtime  insulin lispro (ADMELOG) corrective regimen sliding scale   SubCutaneous three times a day before meals  insulin lispro (ADMELOG) corrective regimen sliding scale   SubCutaneous at bedtime  insulin lispro Injectable (ADMELOG) 15 Unit(s) SubCutaneous three times a day before meals  levothyroxine 25 MICROGram(s) Oral daily  metoprolol succinate ER 25 milliGRAM(s) Oral daily  montelukast 10 milliGRAM(s) Oral daily  multivitamin/minerals 1 Tablet(s) Oral daily  pantoprazole    Tablet 40 milliGRAM(s) Oral before breakfast  piperacillin/tazobactam IVPB.. 3.375 Gram(s) IV Intermittent every 8 hours  polyethylene glycol 3350 17 Gram(s) Oral daily  predniSONE   Tablet 50 milliGRAM(s) Oral daily  predniSONE   Tablet   Oral   senna 2 Tablet(s) Oral at bedtime  tamsulosin 0.8 milliGRAM(s) Oral at bedtime      VITALS:  T(F): 97.4 (04-15-22 @ 13:07), Max: 98.8 (22 @ 16:00)  HR: 71 (04-15-22 @ 13:07)  BP: 153/69 (04-15-22 @ 13:07)  RR: 16 (04-15-22 @ 13:07)  SpO2: 95% (04-15-22 @ 13:07)  Wt(kg): --     @ 07:01  -  04-15 @ 07:00  --------------------------------------------------------  IN: 1279 mL / OUT: 1470 mL / NET: -191 mL    04-15 @ 07:01  -  04-15 @ 13:29  --------------------------------------------------------  IN: 638 mL / OUT: 450 mL / NET: 188 mL    Physical Exam :-  Constitutional: NAD  Respiratory: Bilateral equal breath sounds, no Crackles present.  Cardiovascular: S1, S2 normal, positive Murmur  Gastrointestinal: Bowel Sounds present, soft, non tender.  Extremities: + Edema Feet left lower ext ; right bka- with wound vac  Neurological: Alert and Oriented x 3  Psychiatric: Normal mood, normal affect    LABS:  04-15    145  |  112<H>  |  96<H>  ----------------------------<  111<H>  5.0   |  19<L>  |  1.87<H>    Ca    9.1      15 Apr 2022 01:11  Phos  4.4     04-15  Mg     2.6     04-15      Creatinine Trend: 1.87 <--, 1.82 <--, 1.59 <--, 1.75 <--, 1.87 <--, 1.78 <--, 1.73 <--, 1.82 <--, 2.24 <--, 2.44 <--                        7.9    20.97 )-----------( 320      ( 15 Apr 2022 01:11 )             25.4     Urine Studies:  Urinalysis Basic - ( 2022 06:05 )    Color: Light Yellow / Appearance: Clear / S.021 / pH:   Gluc:  / Ketone: Negative  / Bili: Negative / Urobili: Negative   Blood:  / Protein: Trace / Nitrite: Negative   Leuk Esterase: Moderate / RBC: 185 /hpf / WBC 12 /HPF   Sq Epi:  / Non Sq Epi: 2 /hpf / Bacteria: Negative        RADIOLOGY & ADDITIONAL STUDIES:

## 2022-04-15 NOTE — PROGRESS NOTE ADULT - SUBJECTIVE AND OBJECTIVE BOX
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    INTERVAL HISTORY: transferred to the surgical floor; mental status is back to baseline; left facial droop and left sided weakness are no worse than usual; continues on zosyn for possible "sepsis"; awake and alert sitting in the chair; no shortness of breath or hypoxemia on room air; occasional cough productive of scant sputum; minimal chest congestion and wheeze; no fevers, chills or sweats; no chest pain/pressure or palpitations; CXR is with a small left pleural effusion and bibasilar atelectasis - there is no evidence of pulmonary edema; on heparin gtt for PAD    REVIEW OF SYSTEMS:  Constitutional: As per interval history  HEENT: Within normal limits  CV: As per interval history  Resp: As per interval history  GI: Within normal limits   : KARLOS -> resolved  Musculoskeletal: s/p right BKA  Skin: Within normal limits  Neurological: Within normal limits  Psychiatric: Within normal limits  Endocrine: hyperglycemia  Hematologic/Lymphatic: Within normal limits  Allergic/Immunologic: Within normal limits    MEDICATIONS:     Pulmonary "  albuterol/ipratropium for Nebulization 3 milliLiter(s) Nebulizer every 6 hours  buDESOnide    Inhalation Suspension 0.5 milliGRAM(s) Inhalation every 12 hours  guaiFENesin ER 1200 milliGRAM(s) Oral every 12 hours  montelukast 10 milliGRAM(s) Oral daily    Anti-microbials:  piperacillin/tazobactam IVPB.. 3.375 Gram(s) IV Intermittent every 8 hours    Cardiovascular:  furosemide   Injectable 40 milliGRAM(s) IV Push every 24 hours  metoprolol succinate ER 25 milliGRAM(s) Oral daily  tamsulosin 0.8 milliGRAM(s) Oral at bedtime    Other:  acetaminophen     Tablet .. 975 milliGRAM(s) Oral every 8 hours  atorvastatin 40 milliGRAM(s) Oral at bedtime  chlorhexidine 2% Cloths 1 Application(s) Topical <User Schedule>  finasteride 5 milliGRAM(s) Oral daily  gabapentin 300 milliGRAM(s) Oral every 8 hours  heparin  Infusion 1500 Unit(s)/Hr IV Continuous <Continuous>  insulin glargine Injectable (LANTUS) 40 Unit(s) SubCutaneous at bedtime  insulin lispro (ADMELOG) corrective regimen sliding scale   SubCutaneous three times a day before meals  insulin lispro (ADMELOG) corrective regimen sliding scale   SubCutaneous at bedtime  insulin lispro Injectable (ADMELOG) 15 Unit(s) SubCutaneous three times a day before meals  levothyroxine 25 MICROGram(s) Oral daily  multivitamin/minerals 1 Tablet(s) Oral daily  pantoprazole    Tablet 40 milliGRAM(s) Oral before breakfast  polyethylene glycol 3350 17 Gram(s) Oral daily  predniSONE   Tablet   Oral   predniSONE   Tablet 50 milliGRAM(s) Oral daily  senna 2 Tablet(s) Oral at bedtime    MEDICATIONS  (PRN):  oxyCODONE    IR 5 milliGRAM(s) Oral every 4 hours PRN Moderate Pain (4 - 6)    OBJECTIVE:    PHYSICAL EXAM:       ICU Vital Signs Last 24 Hrs  T(C): 36.3 (15 Apr 2022 13:07), Max: 37.1 (2022 16:00)  T(F): 97.4 (15 Apr 2022 13:07), Max: 98.8 (2022 16:00)  HR: 71 (15 Apr 2022 13:07) (60 - 93)  BP: 153/69 (15 Apr 2022 13:07) (142/65 - 195/80)  BP(mean): 103 (15 Apr 2022 12:00) (94 - 115)  ABP: --  ABP(mean): --  RR: 16 (15 Apr 2022 13:07) (13 - 25)  SpO2: 95% (15 Apr 2022 13:07) (88% - 100%) on room air     General: Awake. Alert. Cooperative. No distress. Appears stated age. Obese. Sitting in the chair talking with family.  HEENT: Atraumatic. Normocephalic. Anicteric. Normal oral mucosa. PERRL. EOMI.  Neck: Supple. Trachea midline. Thyroid without enlargement/tenderness/nodules. No carotid bruit. No JVD.	  Cardiovascular: Regular rate and rhythm. Distant S1 S2. No murmurs, rubs or gallops.  Respiratory: Respirations unlabored. Mild bilateral rhonchi and wheeze. No curvature.  Abdomen: Soft. Non-tender. Non-distended. No organomegaly. No masses. Normal bowel sounds.  Extremities: Warm to touch. No clubbing or cyanosis. No pedal edema. Right BKA with VAC dressing  Pulses: Decreased peripheral pulses LLE  Skin: Venous stasis changes left lower extremity  Lymph Nodes: Cervical, supraclavicular and axillary nodes normal  Neurological: Left sided weakness left > arm. Left facial droop. A and O x 3  Psychiatry: Appropriate mood and affect.    LABS:                          7.9    20.97 )-----------( 320      ( 15 Apr 2022 01:11 )             25.4     CBC    WBC  20.97 <==, 21.40 <==, 19.73 <==, 17.15 <==, 15.11 <==, 11.11 <==, 11.13 <==    Hemoglobin  7.9 <<==, 7.8 <<==, 8.3 <<==, 8.4 <<==, 8.9 <<==, 8.6 <<==, 6.9 <<==    Hematocrit  25.4 <==, 25.6 <==, 27.6 <==, 27.8 <==, 29.0 <==, 29.0 <==, 23.5 <==    Platelets  320 <==, 281 <==, 264 <==, 258 <==, 285 <==, 255 <==, 243 <==      145  |  112<H>  |  96<H>  ----------------------------<  111<H>    04-15  5.0   |  19<L>  |  1.87<H>      LYTES    sodium  145 <==, 144 <==, 140 <==, 141 <==, 140 <==, 139 <==, 141 <==    potassium   5.0 <==, 4.6 <==, 6.8 <==, 5.2 <==, 5.1 <==, 4.7 <==, 4.1 <==    chloride  112 <==, 109 <==, 106 <==, 106 <==, 105 <==, 103 <==, 104 <==    carbon dioxide  19 <==, 21 <==, 20 <==, 21 <==, 22 <==, 21 <==, 20 <==    =============================================================================================  RENAL FUNCTION:    Creatinine:   1.87  <<==, 1.82  <<==, 1.59  <<==, 1.75  <<==, 1.87  <<==, 1.78  <<==, 1.73  <<==    BUN:   96 <==, 99 <==, 99 <==, 99 <==, 100 <==, 102 <==, 98 <==    ============================================================================================    calcium   9.1 <==, 9.0 <==, 8.9 <==, 9.0 <==, 9.0 <==, 9.1 <==, 9.5 <==    phos   4.4 <==, 4.2 <==, 4.6 <==, 3.6 <==, 3.4 <==, 3.3 <==, 3.6 <==    mag   2.6 <==, 2.5 <==, 2.6 <==, 2.4 <==, 2.4 <==, 2.3 <==, 2.4 <==    ============================================================================================  PT/INR - ( 15 Apr 2022 01:11 )   PT: 12.6 sec;   INR: 1.09 ratio       PTT - ( 15 Apr 2022 07:12 )  PTT:115.4 sec    ABG - ( 2022 18:43 )  pH: 7.43  /  pCO2: 35    /  pO2: 80    / HCO3: 23    / Base Excess: -0.8  /  SaO2: 99.0      Venous Blood Gas:   @ 22:10  7.40/41/52/25/84.4  VBG Lactate: 1.8    Venous Blood Gas:   @ 22:23  7.40/39/45/24/78.1  VBG Lactate: 2.4    Venous Blood Gas:   @ 22:23  7.40/39/45/24/78.1  VBG Lactate: 2.4    Venous Blood Gas:  04-10 @ 23:19  7.39/42/44/25/72.2  VBG Lactate: 2.5    < from: TTE with Doppler (w/Cont) (22 @ 15:07) >    Patient name: Martir Heart  YOB: 1935   Age: 86 (M)   MR#: [id number]  Study Date: 4/3/2022  Location: [address] [place], [place] [id number]: Angie Olivarez RDCS  Study quality: Technically difficult  Referring Physician: Anmol Quinn MD  BloodPressure: 115/70 mmHg  Height: 173 cm  Weight: 92 kg  BSA: 2.1 m2  ------------------------------------------------------------------------  PROCEDURE: Transthoracic echocardiogram with 2-D, M-Mode  and complete spectral and color flow Doppler. Verbal  consent was obtained for injection of  Ultrasonic Enhancing  Agent following a discussion of risks and benefits.  Following intravenous injection of Ultrasonic Enhancing  Agent, harmonic imaging was performed.  INDICATION: Cardiomyopathy, unspecified (I42.9)  ------------------------------------------------------------------------  Dimensions:    Normal Values:  LA:     3.1    2.0 - 4.0 cm  Ao:     3.5    2.0 - 3.8 cm  SEPTUM: 1.1    0.6 - 1.2 cm  PWT:    1.3    0.6 - 1.1 cm  LVIDd:  4.3    3.0- 5.6 cm  LVIDs:  3.2    1.8 - 4.0 cm  Derived variables:  LVMI: 90 g/m2  RWT: 0.60  Fractional short: 26 %  EF (Visual Estimate): NWV %  Doppler Peak Velocity (m/sec): MV=1.6 AoV=1.4  ------------------------------------------------------------------------  Observations:  Mitral Valve: Mitral valve not well visualized. Mitral  annular calcification. Peak mitral valve gradient equals 10  mm Hg, mean transmitral valve gradient equals 4 mm Hg,  consistent with mild mitral stenosis.  Aortic Valve/Aorta: Aortic valve not well visualized;  appears calcified. Peak transaortic valve gradient equals 8  mm Hg, mean transaortic valve gradient equals 3 mm Hg,  estimated aortic valve area equals 2 sqcm (by continuity  equation), aortic valve velocity time integral equals 22  cm, consistent with mild aortic stenosis. Peak left  ventricular outflow tract gradient equals 3 mm Hg, mean  gradient is equal to 1 mm Hg, LVOT velocity time integral  equals 12 cm.  Aortic Root: 3.5 cm.  Left Atrium: Normal left atrium.  Left Ventricle: Endocardial visualization enhanced with  intravenous injection of Ultrasonic Enhancing Agent  (Definity). Mild left ventricular systolic dysfunction. The  inferior wall, and the inferoseptum are hypokinetic.  Normal left ventricular internal dimensions and wall  thicknesses. Unable to evaluate diastology.  Right Heart: A device wire is noted in the right heart. The  right ventricle is not well visualized; grossly normal  right ventricular systolic function. Tricuspid valve not  well visualized. Pulmonic valve not well visualized,  probably normal.  Pericardium/Pleura: Normal pericardium with trace  pericardial effusion.  Hemodynamic: Estimated right atrial pressure is 8 mm Hg.  ------------------------------------------------------------------------  Conclusions:  1. Aortic valve not well visualized; appears calcified.  Peak transaortic valve gradient equals 8 mm Hg, mean  transaortic valve gradient equals 3 mm Hg, estimated aortic  valve area equals 2 sqcm (by continuity equation), aortic  valve velocity time integral equals 22 cm, consistent with  mild aortic stenosis.  2. Endocardial visualization enhanced with intravenous  injection of Ultrasonic Enhancing Agent (Definity). Mild  left ventricular systolic dysfunction. The inferior wall,  and the inferoseptum are hypokinetic.  3. The right ventricle is not well visualized; grossly  normal right ventricular systolic function.  ------------------------------------------------------------------------  Confirmed on  4/3/2022 - 17:12:14 by BRITTANY Giraldo  ------------------------------------------------------------------------  --------------------------------------------------------------------------------------------------------------  ---------------------------------------------------------------------------------------------------------------  MICROBIOLOGY:     COVID-19 PCR . (22 @ 18:23)   COVID-19 PCR: NotDetec:     Urinalysis Basic - ( 2022 06:05 )    Color: Light Yellow / Appearance: Clear / S.021 / pH: x  Gluc: x / Ketone: Negative  / Bili: Negative / Urobili: Negative   Blood: x / Protein: Trace / Nitrite: Negative   Leuk Esterase: Moderate / RBC: 185 /hpf / WBC 12 /HPF   Sq Epi: x / Non Sq Epi: 2 /hpf / Bacteria: Negative    Culture - Blood (22 @ 11:16)   Specimen Source: .Blood Blood-Peripheral   Culture Results:   No growth to date.     Culture - Blood (22 @ 11:16)   Specimen Source: .Blood Blood-Peripheral   Culture Results:   No growth to date.     RADIOLOGY:  [x] Chest radiographs reviewed and interpreted by me    EXAM:  XR CHEST PORTABLE ROUTINE 1V                          PROCEDURE DATE:  2022      FINDINGS:    Support devices: A pacemaker overlies left chest wall with its leads   intact.    Cardiac/mediastinum/hilum: Heart size cannot be accurately assessed on   this projection.    Lung parenchyma/Pleura: Bilateral lower lung hazy opacities, unchanged.   There is no pneumothorax.    Skeleton/soft tissues: No acute osseous abnormalities.    IMPRESSION:    Unchanged bilateral lower lung hazy opacities may represent atelectasis   versus infection.    EDITH HER MD; Resident Radiologist  This document has been electronically signed.  SATURNINO CHERRY MD; Attending Radiologist  This document has been electronically signed. 2022 10:54AM  ---------------------------------------------------------------------------------------------------------------   EXAM:  CT BRAIN                          PROCEDURE DATE:  2022      FINDINGS:    Prominence of ventricles and subarachnoid spaces, compatible with   atrophy. Periventricular small vessel white matter ischemic changes.   Encephalomalacia and gliosis is appreciated bilaterally compatible with   old regions of infarction, noted in a high left posterior frontal   location, right posterior parietal/occipital location, and right   cerebellar location. Old ischemic event is also appreciated along the   anterior limb of the internal capsule on the left and along the external   capsular region on the right.    There is prominence of the bifrontal extra-axial regions, suggestive of   bifrontal subdural hygromas, more prominent on the left than the   right-stable compared with prior. Prominent deposition of calcified   plaque within the bilateral carotid siphons, as on prior.    Deposition of calcium appreciated within the bilateral basal ganglia, as   on prior.    No acute intracranial hemorrhage. No intraparenchymal mass lesion, mass   effect, or midline shift.    Appearance of the bilateral optic lenses suggests the patient has   previously undergone prior cataract surgery.    Imaged paranasal sinuses, bilateral mastoid air cells, middle ear   cavities are clear.    IMPRESSION:  1. No acute intracranial hemorrhage.    2. 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 No evidence of an acute ischemic event.    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    DAWN BEHR-VENTURA MD; Attending Radiologist  This document has been electronically signed. 2022  8:25PM  ---------------------------------------------------------------------------------------------------------------   EXAM:  XR CHEST PORTABLE ROUTINE 1V                          PROCEDURE DATE:  2022      FINDINGS:    Support devices: A pacemaker overlies the left chest wall with its leads   intact.    Cardiac/mediastinum/hilum: Heart size cannot be accurately assessed on   this projection.    Lung parenchyma/Pleura: Right lower lung hazy opacities. Bibasilar   atelectasis. Trace left pleural effusion, unchanged. There is no   pneumothorax.    Skeleton/soft tissues: No acute osseous abnormalities.    IMPRESSION:    Right lower lung hazy opacities, which may represent atelectasis versus   infection.    Unchanged trace left pleural effusion.    EDITH HER MD; Resident Radiologist  This document has been electronically signed.  EVON MAN MD; Attending Radiologist  This document has been electronically signed. 2022  5:11PM  ---------------------------------------------------------------------------------------------------------------  EXAM:  XR CHEST PORTABLE ROUTINE 1V                          PROCEDURE DATE:  04/10/2022      FINDINGS:  Left pacemaker with leads in the right atrium and ventricle.    The heart size is not accurately assessed on this projection.  Bilateral lower lung field patchy opacities, left greater than right.  There is no pneumothorax. Trace left pleural effusion.    IMPRESSION:  Bilateral patchy opacities, left greater than right, differential   includes atelectasis or infection.    JAYDON MONTEMAYOR MD; Resident Radiologist  This document has been electronically signed.  SATURNINO CHERRY MD; Attending Radiologist  This document has been electronically signed. Apr 10 2022  9:14AM  --------------------------------------------------------------------------------------------  EXAM:  XR CHEST PORTABLE URGENT 1V                          PROCEDURE DATE:  2022      FINDINGS:  Left chest wall dual-lead pacemaker. Rotated exam limits assessment for   lead tip.    Small left pleural effusion with adjacent opacity. No pneumothorax.    Cardiac size cannot accurately be assessed in this projection.  Aortic   calcifications.      IMPRESSION: Small left pleural effusion with adjacent atelectasis   obscuring evaluation of the underlying lung.    DRE SANCHEZ MD; Resident Radiology  This document has been electronically signed.  WAGNER LAM MD; Attending Radiologist  This document has been electronically signed. 2022  5:54PM  ---------------------------------------------------------------------------------------------------------------  EXAM:  XR CHEST PORTABLE URGENT 1V                          PROCEDURE DATE:  2022      FINDINGS:  Left chest wall dual-lead pacemaker.  The heart is normal in size.  The lungs are clear.  There is no pneumothorax or pleural effusion.    IMPRESSION:  Clear lungs.    KENA CARRINGTON MD; Resident Radiologist  This document has been electronically signed.  SATURNINO CHERRY MD; Attending Radiologist  This document has been electronically signed. 2022 11:40AM  ---------------------------------------------------------------------------------------------------------------  EXAM:  XR CHEST PORTABLE URGENT 1V                          PROCEDURE DATE:  2022      FINDINGS:    Appropriate course of dual-chamber pacemaker. Unremarkable   cardiomediastinal silhouette. Minimal left basilar atelectasis. No   pleural effusion or pneumothorax.      IMPRESSION:    Mild left basilar atelectasis.    JOSEPH GAMINO M.D., ATTENDING RADIOGIST  This document has been electronically signed. Apr  3 2022  9:00AM  ---------------------------------------------------------------------------------------------------------------

## 2022-04-15 NOTE — PROGRESS NOTE ADULT - SUBJECTIVE AND OBJECTIVE BOX
DIABETES FOLLOW UP : Seen earlier today    INTERVAL HX: pt tolerating po   transitioned off gtt by sicu over night, based on insulin gtt rates spoke with team to lower prandial insulin dose to 15 units . pt reports he is ok , RN also at bedside endorses he is tolerating po, planning to move to floor today .     Review of Systems:  General: As above  Cardiovascular: No chest pain  Respiratory: No SOB  GI: No nausea, vomiting  Endocrine: no  S&Sx of hypoglycemia    Allergies    No Known Allergies    Intolerances      MEDICATIONS  (STANDING):  acetaminophen     Tablet .. 975 milliGRAM(s) Oral every 8 hours  albuterol/ipratropium for Nebulization 3 milliLiter(s) Nebulizer every 6 hours  atorvastatin 40 milliGRAM(s) Oral at bedtime  buDESOnide    Inhalation Suspension 0.5 milliGRAM(s) Inhalation every 12 hours  chlorhexidine 2% Cloths 1 Application(s) Topical <User Schedule>  finasteride 5 milliGRAM(s) Oral daily  furosemide   Injectable 40 milliGRAM(s) IV Push every 24 hours  gabapentin 300 milliGRAM(s) Oral every 8 hours  guaiFENesin ER 1200 milliGRAM(s) Oral every 12 hours  heparin  Infusion 1500 Unit(s)/Hr (15 mL/Hr) IV Continuous <Continuous>  insulin glargine Injectable (LANTUS) 40 Unit(s) SubCutaneous at bedtime  insulin lispro (ADMELOG) corrective regimen sliding scale   SubCutaneous three times a day before meals  insulin lispro (ADMELOG) corrective regimen sliding scale   SubCutaneous at bedtime  insulin lispro Injectable (ADMELOG) 15 Unit(s) SubCutaneous three times a day before meals  levothyroxine 25 MICROGram(s) Oral daily  metoprolol succinate ER 25 milliGRAM(s) Oral daily  montelukast 10 milliGRAM(s) Oral daily  multivitamin/minerals 1 Tablet(s) Oral daily  pantoprazole    Tablet 40 milliGRAM(s) Oral before breakfast  piperacillin/tazobactam IVPB.. 3.375 Gram(s) IV Intermittent every 8 hours  polyethylene glycol 3350 17 Gram(s) Oral daily  predniSONE   Tablet   Oral   predniSONE   Tablet 50 milliGRAM(s) Oral daily  senna 2 Tablet(s) Oral at bedtime  tamsulosin 0.8 milliGRAM(s) Oral at bedtime      PHYSICAL EXAM:  VITALS: T(C): 36.3 (04-15-22 @ 13:07)  T(F): 97.4 (04-15-22 @ 13:07), Max: 98.4 (04-14-22 @ 20:00)  HR: 71 (04-15-22 @ 13:07) (60 - 93)  BP: 153/69 (04-15-22 @ 13:07) (142/65 - 195/80)  RR:  (13 - 25)  SpO2:  (88% - 100%)  Wt(kg): --  GENERAL: male sitting in chair in NAD  Abdomen: Soft, nontender, non distended  Extremities: Warm, R bka immobilizer present   NEURO: A&O X3    LABS:  POCT Blood Glucose.: 103 mg/dL (04-15-22 @ 13:12)  POCT Blood Glucose.: 111 mg/dL (04-15-22 @ 11:40)  POCT Blood Glucose.: 173 mg/dL (04-15-22 @ 06:56)  POCT Blood Glucose.: 109 mg/dL (04-15-22 @ 03:29)  POCT Blood Glucose.: 96 mg/dL (04-15-22 @ 02:38)  POCT Blood Glucose.: 110 mg/dL (04-15-22 @ 01:20)  POCT Blood Glucose.: 119 mg/dL (04-15-22 @ 00:39)  POCT Blood Glucose.: 134 mg/dL (04-14-22 @ 23:04)  POCT Blood Glucose.: 154 mg/dL (04-14-22 @ 22:22)  POCT Blood Glucose.: 150 mg/dL (04-14-22 @ 21:08)  POCT Blood Glucose.: 158 mg/dL (04-14-22 @ 20:10)  POCT Blood Glucose.: 154 mg/dL (04-14-22 @ 18:56)  POCT Blood Glucose.: 131 mg/dL (04-14-22 @ 18:03)  POCT Blood Glucose.: 149 mg/dL (04-14-22 @ 17:06)  POCT Blood Glucose.: 138 mg/dL (04-14-22 @ 16:06)  POCT Blood Glucose.: 153 mg/dL (04-14-22 @ 15:13)  POCT Blood Glucose.: 174 mg/dL (04-14-22 @ 14:02)  POCT Blood Glucose.: 122 mg/dL (04-14-22 @ 12:57)  POCT Blood Glucose.: 181 mg/dL (04-14-22 @ 12:04)  POCT Blood Glucose.: 258 mg/dL (04-14-22 @ 10:55)  POCT Blood Glucose.: 251 mg/dL (04-14-22 @ 10:00)  POCT Blood Glucose.: 258 mg/dL (04-14-22 @ 08:59)  POCT Blood Glucose.: 302 mg/dL (04-14-22 @ 08:11)  POCT Blood Glucose.: 282 mg/dL (04-14-22 @ 06:51)  POCT Blood Glucose.: 311 mg/dL (04-14-22 @ 05:24)  POCT Blood Glucose.: 331 mg/dL (04-13-22 @ 21:46)  POCT Blood Glucose.: 149 mg/dL (04-13-22 @ 17:15)  POCT Blood Glucose.: 150 mg/dL (04-13-22 @ 16:06)  POCT Blood Glucose.: 164 mg/dL (04-13-22 @ 15:02)  POCT Blood Glucose.: 123 mg/dL (04-13-22 @ 13:56)  POCT Blood Glucose.: 120 mg/dL (04-13-22 @ 12:59)  POCT Blood Glucose.: 130 mg/dL (04-13-22 @ 11:57)  POCT Blood Glucose.: 151 mg/dL (04-13-22 @ 10:54)  POCT Blood Glucose.: 153 mg/dL (04-13-22 @ 10:01)  POCT Blood Glucose.: 135 mg/dL (04-13-22 @ 09:02)  POCT Blood Glucose.: 130 mg/dL (04-13-22 @ 07:57)  POCT Blood Glucose.: 142 mg/dL (04-13-22 @ 06:56)  POCT Blood Glucose.: 103 mg/dL (04-13-22 @ 06:03)  POCT Blood Glucose.: 132 mg/dL (04-13-22 @ 04:59)  POCT Blood Glucose.: 155 mg/dL (04-13-22 @ 04:03)  POCT Blood Glucose.: 162 mg/dL (04-13-22 @ 02:55)  POCT Blood Glucose.: 148 mg/dL (04-13-22 @ 01:58)  POCT Blood Glucose.: 146 mg/dL (04-13-22 @ 00:55)  POCT Blood Glucose.: 144 mg/dL (04-12-22 @ 23:58)  POCT Blood Glucose.: 109 mg/dL (04-12-22 @ 23:04)  POCT Blood Glucose.: 126 mg/dL (04-12-22 @ 21:59)  POCT Blood Glucose.: 140 mg/dL (04-12-22 @ 20:57)  POCT Blood Glucose.: 139 mg/dL (04-12-22 @ 20:01)  POCT Blood Glucose.: 183 mg/dL (04-12-22 @ 19:04)  POCT Blood Glucose.: 174 mg/dL (04-12-22 @ 18:18)  POCT Blood Glucose.: 148 mg/dL (04-12-22 @ 17:14)  POCT Blood Glucose.: 145 mg/dL (04-12-22 @ 16:12)                            7.9    20.97 )-----------( 320      ( 15 Apr 2022 01:11 )             25.4       04-15    145  |  112<H>  |  96<H>  ----------------------------<  111<H>  5.0   |  19<L>  |  1.87<H>    Ca    9.1      15 Apr 2022 01:11  Phos  4.4     04-15  Mg     2.6     04-15        eGFR: 34 mL/min/1.73m2 (15 Apr 2022 01:11)          Thyroid Function Tests:          A1C with Estimated Average Glucose Result: 6.8 % (04-02-22 @ 12:21)      Estimated Average Glucose: 148 mg/dL (04-02-22 @ 12:21)

## 2022-04-15 NOTE — PROGRESS NOTE ADULT - ASSESSMENT
87 y/o M w/ uncontrolled Type 2 DM complicated by neuropathy and nephropathy with hyperglycemia exacerbated by steroids, HTN, HLD, hypothyroidism admitted for right LE AKA  , transitioned to basal bolus overnight by team, now w/ tightly controlled BG at lunch, lowered prandial admelog, noted plan to decrease prednisone to 40mg on SUNDAY 4/17       Problem/Recommendation - 1:  ·  Problem: Uncontrolled type 2 diabetes mellitus with hyperglycemia.   ·  Recommendation: Diabetes Education and Nutrition Eval  -consider changing IVABx to a solution other than D5  -On prednisone 50mg po qd last dose 4/16  -c/w Lantus 40 units qhs   -Admelog 15 units TID AC   -MDSS TID AC and MDSS qHS   Goal glucose 100-180  Outpt. endo follow-up  Outpt. optho, podiatry, nephrology  Plan to d/c on basal + orals including SGLT2 given CKD. Would avoid metformin and sulfonylurea.     Problem/Recommendation - 2:  ·  Problem: Essential hypertension.   ·  Recommendation: Goal BP <140/90, on metoprolol     Problem/Recommendation - 3:  ·  Problem: Hyperlipidemia.   ·  Recommendation: on statin.     Problem/Recommendation - 4:  ·  Problem: Hypothyroidism.   ·  Recommendation: continue levothyroxine. Repeat TFTs as outpatient.    Discussed with patient and primary  team RICHARD Bradley  Contact via [employer] Teams during business hours  On evenings and weekends, please call [phone] or page endocrine fellow on call.   Please note that this patient may be followed by different provider tomorrow.    Time spent on encounter: 28  minutes spent on total encounter; The necessity of the time spent during the encounter on this date of service was due to development of plan of care/coordination of care/glycemic control through review of labs, blood glucose values and vital signs.

## 2022-04-15 NOTE — PROGRESS NOTE ADULT - ASSESSMENT
85 yo male ex  smoker, h/o HTN, HLD, DM, CKD, CVA, CHF (mild systolic dysfunction) and atrial fibrillation with sick sinus syndrome s/p pacemaker implantation.  h/o COPD/ZACKARY, TOM colonization/infection, admitted on 4/1 with RLE wet gangrene, s/p R guillotine BKA, and is awaiting a staged right lower extremity AKA. on heparin gtt for PAD  post op course complicated by acute hypoxic respiratory failure 2/2 COPD exacerbation. now resolved  ptn seen in 8ICU  Ptn is now stable on po prednisone 50mg daily - taper by 10mg every 2 days   albuterol/atrovent nebs   pulmicort 0.5mg nebs q12h   mucinex 1200mg 2 times daily  singulair 10mg @ bedtime  acapella device/incentive spirometer  on RA 91-92% pulse Ox  steroid induced hyperglycemia, now off insulin drip, on Lantus and prandial Admelog  placed on Zosyn 2/2 possible sepsis after he was found with worsening mental status   its possible AMS on 4/13 was 2/2 analgesics prior to the VAC change on 4/13. since 4/14 MS back at baseline  CKD3/s/p KARLOS due to diuresis in the setting of euvolemia -> improved, renal following  HTN, systolic CHF, Afib stable, cardiology following

## 2022-04-15 NOTE — PROGRESS NOTE ADULT - SUBJECTIVE AND OBJECTIVE BOX
SICU Daily Progress Note  =====================================================  Interval/Overnight Events:     - d/c insulin gtt, now on ISS and lantus pre meal    HPI: 86y M with Hx DM, HTN, COPD, and HLD who presented with RLE pain (s/p angio 2021), found to have wet gangrene s/p R laura JAMES . Post-op course c/b severe COPD exacerbation requiring HFNC. SICU consulted for respiratory monitoring.     Allergies: No Known Allergies      MEDICATIONS:   --------------------------------------------------------------------------------------  Neurologic Medications  acetaminophen     Tablet .. 975 milliGRAM(s) Oral every 8 hours  gabapentin 300 milliGRAM(s) Oral every 8 hours  oxyCODONE    IR 10 milliGRAM(s) Oral every 4 hours PRN Severe Pain (7 - 10)  oxyCODONE    IR 5 milliGRAM(s) Oral every 4 hours PRN Moderate Pain (4 - 6)    Respiratory Medications  albuterol/ipratropium for Nebulization 3 milliLiter(s) Nebulizer every 6 hours  buDESOnide    Inhalation Suspension 0.5 milliGRAM(s) Inhalation every 12 hours  guaiFENesin ER 1200 milliGRAM(s) Oral every 12 hours  montelukast 10 milliGRAM(s) Oral daily    Cardiovascular Medications  metoprolol succinate ER 25 milliGRAM(s) Oral daily  tamsulosin 0.8 milliGRAM(s) Oral at bedtime    Gastrointestinal Medications  multivitamin/minerals 1 Tablet(s) Oral daily  pantoprazole    Tablet 40 milliGRAM(s) Oral before breakfast  polyethylene glycol 3350 17 Gram(s) Oral daily  senna 2 Tablet(s) Oral at bedtime    Genitourinary Medications    Hematologic/Oncologic Medications  heparin  Infusion 1800 Unit(s)/Hr IV Continuous <Continuous>    Antimicrobial/Immunologic Medications  piperacillin/tazobactam IVPB.. 3.375 Gram(s) IV Intermittent every 8 hours    Endocrine/Metabolic Medications  atorvastatin 40 milliGRAM(s) Oral at bedtime  finasteride 5 milliGRAM(s) Oral daily  insulin glargine Injectable (LANTUS) 40 Unit(s) SubCutaneous once  insulin lispro (ADMELOG) corrective regimen sliding scale   SubCutaneous three times a day before meals  insulin lispro (ADMELOG) corrective regimen sliding scale   SubCutaneous at bedtime  insulin lispro Injectable (ADMELOG) 20 Unit(s) SubCutaneous three times a day before meals  levothyroxine 25 MICROGram(s) Oral daily  predniSONE   Tablet   Oral   predniSONE   Tablet 50 milliGRAM(s) Oral daily    Topical/Other Medications  chlorhexidine 2% Cloths 1 Application(s) Topical <User Schedule>    --------------------------------------------------------------------------------------    VITAL SIGNS, INS/OUTS (last 24 hours):  --------------------------------------------------------------------------------------  T(C): 36.3 (04-15-22 @ 02:00), Max: 37.1 (22 @ 16:00)  HR: 66 (04-15-22 @ 02:00) (60 - 85)  BP: 163/72 (04-15-22 @ 02:00) (125/58 - 176/79)  RR: 13 (04-15-22 @ 02:00) (13 - 35)  SpO2: 96% (04-15-22 @ 02:00) (90% - 100%)    22 @ 07:01  -  22 @ 07:00  --------------------------------------------------------  IN: 1455 mL / OUT: 2565 mL / NET: -1110 mL    22 @ 07:01  -  04-15-22 @ 03:01  --------------------------------------------------------  IN: 1207 mL / OUT: 1470 mL / NET: -263 mL      --------------------------------------------------------------------------------------    EXAM  NEUROLOGY  Exam: Normal, NAD, alert, oriented x3, no focal deficits.    HEENT  Exam: Normocephalic, atraumatic, EOMI.     RESPIRATORY  Exam: Normal expansion/effort.  Mechanical Ventilation:     CARDIOVASCULAR  Exam: Regular rate and rhythm.       GI/NUTRITION  Exam: Abdomen soft, Non-tender, Non-distended.     VASCULAR  Exam: Extremities warm, pink, well-perfused.     MUSCULOSKELETAL  Exam: All extremities moving spontaneously without limitations.     SKIN  Exam: Good skin turgor, no skin breakdown.       LABS  --------------------------------------------------------------------------------------                        7.9    20.97 )-----------( 320      ( 15 Apr 2022 01:11 )             25.4   04-15    145  |  112<H>  |  96<H>  ----------------------------<  111<H>  5.0   |  19<L>  |  1.87<H>    Ca    9.1      15 Apr 2022 01:11  Phos  4.4     -15  Mg     2.6     -15    ABG - ( 2022 18:43 )  pH, Arterial: 7.43  pH, Blood: x     /  pCO2: 35    /  pO2: 80    / HCO3: 23    / Base Excess: -0.8  /  SaO2: 99.0            Urinalysis Basic - ( 2022 06:05 )    Color: Light Yellow / Appearance: Clear / S.021 / pH: x  Gluc: x / Ketone: Negative  / Bili: Negative / Urobili: Negative   Blood: x / Protein: Trace / Nitrite: Negative   Leuk Esterase: Moderate / RBC: 185 /hpf / WBC 12 /HPF   Sq Epi: x / Non Sq Epi: 2 /hpf / Bacteria: Negative    PT/INR - ( 15 Apr 2022 01:11 )   PT: 12.6 sec;   INR: 1.09 ratio         PTT - ( 15 Apr 2022 01:11 )  PTT:104.9 sec  --------------------------------------------------------------------------------------

## 2022-04-15 NOTE — PROGRESS NOTE ADULT - ASSESSMENT
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 Post-op course c/b severe COPD exacerbation requiring HFNC. SICU consulted for respiratory monitoring.     PLAN:   Neuro: post-op pain  - Pain control: Tylenol 975mg PO q6hr ATC, Oxycodone 5/10mg q4hr PRN, Gabapentin 300mg PO q8hr  - Multivitamin    Respiratory: COPD exacerbation   - 3L NC  - Monitor respiratory status  - Duonebs, mucinex, pulmicort, montelukast,  - Incentive spirometry  - NC 3 L/min    Cardiovascular: h/o HTN, HLD, AF  - Monitor vitals signs  - C/w home Metoprolol 25mg PO daily for BP control  - C/w home Atorvastatin 40mg PO HS for HLD    Gastrointestinal: no active issues   - Diet: Regular CC  - Protonix 40mg PO qD  - Bowel regimen: Senna, Miralax    Genitourinary/Renal: h/o urinary retention  - C/w home Flomax and Finasteride  - Monitor UOP, Adair for strict I/O monitoring  - Trend electrolytes on BMP qD, replete PRN    Heme: no active issues   - Hep gtt for AF, monitor PTT qD  - Trend H/H on CBC qD    ID: RLE wet gangrene s/p laura R ERIKA 4/4  - Abx: Zosyn 3.375g IV q8hr  - Trend WBC on CBC qD  - Monitor fever curve    Endocrine: h/o DM, hypothyroidism  - ISS  - Prednisone 50mg PO daily for COPD exacerbation   - C/w home Synthroid 25mcg PO qD    Lines:   - PIV x 2  - Intermittently straight cathed    Code Status: Full Code  Dispo: SICU

## 2022-04-15 NOTE — PROGRESS NOTE ADULT - ASSESSMENT
86M PMH HTN, HLD, DM2 and nonhealing wounds of RLE who is non-ambulatory at home now s/p right guillotine BELOW knee amputation. Postoperative course c/b severe COPD exacerbation requiring excalation of O2 supplementation with HFNC. SICU consulted for close respiratory monitoring in the setting of COPD exacerbation. Overnight attempted weaning of insulin gtt but unsuccessful. Concern for CVA however CT negative. Blood cx sent, zosyn started.     PLAN  - Continue hep gtt, goal aPTT 59-99  - zosyn  - Pain control  - Regular diet  - Glucose control  - No plan for AKA on this admission.  - Wean off steriods  - Appreciate excellent SICU care    Vascular Surgery  9007              - Appreciate excellent SICU care    Vascular Surgery  9005       86M PMH HTN, HLD, DM2 and nonhealing wounds of RLE who is non-ambulatory at home now s/p right guillotine BELOW knee amputation. Postoperative course c/b severe COPD exacerbation requiring excalation of O2 supplementation with HFNC. SICU consulted for close respiratory monitoring in the setting of COPD exacerbation. Overnight attempted weaning of insulin gtt but unsuccessful. Concern for CVA however CT negative. Blood cx sent, zosyn started.     PLAN  - Continue hep gtt, goal aPTT 59-99  - zosyn  - Pain control  - Regular diet  - Glucose control  - Cont vac changes M W F  - No plan for AKA on this admission.  - Wean off steriods  - Appreciate excellent SICU care    Vascular Surgery  9001              - Appreciate excellent SICU care    Vascular Surgery  900

## 2022-04-15 NOTE — PROGRESS NOTE ADULT - ATTENDING COMMENTS
88 yo m, s/p R laura JAMES, here for glycemic control on insulin gtt.    N Baseline, new focal findings.  P RA sat >90.  C Off pressors, paced at 60.  G Ramez PO, thick liquids.  R Cr 1.87, UOP 1.5L/24h. Net even. Urinary retention, failing void trials, on Flomax, Proscar, f/u Urology, continue Adair.  DVT heparin gtt.  E Off insulin gtt. ISS moderate. Endocrinology following. Monitor glycemia.  MSK pending plan for amputation revision.

## 2022-04-15 NOTE — PROGRESS NOTE ADULT - ASSESSMENT
ASSESSMENT:    86 year old gentleman, former smoker, followed by Dr. Alicja Rob of our practice for asthma/COPD overlap  syndrome and obstructive sleep apnea being treated conservatively. He has no history of TOM colonization/infection as listed in the "past medical history". He has been stable from a pulmonary perspective and maintained on budesonide and duoneb once daily and if needed. He also has a history of HTN, HLD, DM, CKD, CVA, CHF (mild systolic dysfunction) and atrial fibrillation with sick sinus syndrome s/p pacemaker implantation. He has been followed for many months for chronic foot wounds and leg pain now with some purulence and gangrene. The pain is exacerbated when laying in bed and improves with tylenol and when hanging the legs off of the bed. He is no longer able to ambulate. 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 The patient has developed marked shortness of breath with severe hypoxemia currently requiring a nasal canula @ 5lpm to maintain saturation @ 90%. He has a cough with copious mucous in his chest which he is unable to expectorate. He has chest congestion and wheeze. No fevers, chills or sweats. No chest pain/pressure or palpitations. Called by the patient's family and asked to be involved with his pulmonary care.    acute hypoxic respiratory failure -> resolved    1) severe COPD exacerbation -> slowly improving  2) 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 limiting diaphragmatic excursion and respiratory muscle weakness decreasing chest wall expansion -> bibasilar atelectasis  3) no evidence of pulmonary edema or pneumonia on the most recent CXR    leukocytosis more likely related to systemic steroids than infection    steroid induced hyperglycemia    CKD/KARLOS due to diuresis in the setting of euvolemia -> improved    4/14 - remains in the ICU; continues on an insulin infusion for steroid induced hyperglycemia; worsening of his mental status and chronic left sided weakness and left facial droop after analgesics prior to the VAC change yesterday; CT scan and EEG are unremarkable; started on zosyn for possible "sepsis"; now awake and alert sitting in the chair and back to his baseline mental status; no shortness of breath or hypoxemia on room air; occasional cough productive of scant sputum; minimal chest congestion and wheeze; no fevers, chills or sweats; no chest pain/pressure or palpitations; CXR now has bibasilar atelectasis - there is no evidence of pulmonary edema; on heparin gtt for PAD    4/15 - transferred to the surgical floor; mental status is back to baseline; left facial droop and left sided weakness are no worse than usual; continues on zosyn for possible "sepsis"; awake and alert sitting in the chair; no shortness of breath or hypoxemia on room air; occasional cough productive of scant sputum; minimal chest congestion and wheeze; no fevers, chills or sweats; no chest pain/pressure or palpitations; CXR is with a small left pleural effusion and bibasilar atelectasis - there is no evidence of pulmonary edema; on heparin gtt for PAD    PLAN/RECOMMENDATIONS:    stable oxygenation on room air  EEG -> mild to moderate nonspecific diffuse or multifocal cerebral dysfunction -  no epileptiform patterns or seizures recorded.  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, stable in appearance compared with prior dated 9/8/2019  started on zosyn for "sepsis" given increasing leukocytosis - blood cultures are negative - no evidence of a UTI on U/A  prednisone 50mg daily - taper by 10mg every 2 days - glucose control on steroids has improved - off an insulin gtt  albuterol/atrovent nebs q6h  pulmicort 0.5mg nebs q12h - give after duoneb - rinse mouth after use  mucinex 1200mg 2 times daily  singulair 10mg @ bedtime  acapella device/incentive spirometer  respiratory status continues to improve - his hypoxemia has resolved and bronchospasm has improved - at this point, there is no pulmonary contraindication to the proposed AKA  cardiac meds: lipitor/toprol XL/lasix  flomax/proscar  vascular surgery follow-up    heparin gtt    pain control  GI prophylaxis - protonix  proscar/flomax  bowel regimen  out of bed and into the chair    Will follow with you. Plan of care discussed with the patient and his family at bedside.    The patient's respiratory status remains stable. Please call [phone] over the weekend with questions or clinical changes. Dr. Abelardo John be covering the service.      Sarabjit Wright MD, [facility]  [phone]  Pulmonary Medicine

## 2022-04-15 NOTE — PROGRESS NOTE ADULT - SUBJECTIVE AND OBJECTIVE BOX
Ritesh Bell MD  Interventional Cardiology / Advance Heart Failure and Cardiac Transplant Specialist  [place] Office : [phone] [place] [address] [place]. [address]  Tel:   [place] Office : [address] [facility] [place] [address]  Tel: [phone]       Pt is lying in bed comfortable not in distress, no chest pains some SOB no palpitations, pain at BKA site   	  MEDICATIONS:  furosemide   Injectable 40 milliGRAM(s) IV Push every 24 hours  heparin  Infusion 1500 Unit(s)/Hr IV Continuous <Continuous>  metoprolol succinate ER 25 milliGRAM(s) Oral daily  tamsulosin 0.8 milliGRAM(s) Oral at bedtime    piperacillin/tazobactam IVPB.. 3.375 Gram(s) IV Intermittent every 8 hours    albuterol/ipratropium for Nebulization 3 milliLiter(s) Nebulizer every 6 hours  buDESOnide    Inhalation Suspension 0.5 milliGRAM(s) Inhalation every 12 hours  guaiFENesin ER 1200 milliGRAM(s) Oral every 12 hours  montelukast 10 milliGRAM(s) Oral daily    acetaminophen     Tablet .. 975 milliGRAM(s) Oral every 8 hours  gabapentin 300 milliGRAM(s) Oral every 8 hours  oxyCODONE    IR 5 milliGRAM(s) Oral every 4 hours PRN    pantoprazole    Tablet 40 milliGRAM(s) Oral before breakfast  polyethylene glycol 3350 17 Gram(s) Oral daily  senna 2 Tablet(s) Oral at bedtime    atorvastatin 40 milliGRAM(s) Oral at bedtime  finasteride 5 milliGRAM(s) Oral daily  insulin glargine Injectable (LANTUS) 40 Unit(s) SubCutaneous at bedtime  insulin lispro (ADMELOG) corrective regimen sliding scale   SubCutaneous three times a day before meals  insulin lispro (ADMELOG) corrective regimen sliding scale   SubCutaneous at bedtime  insulin lispro Injectable (ADMELOG) 15 Unit(s) SubCutaneous three times a day before meals  levothyroxine 25 MICROGram(s) Oral daily  predniSONE   Tablet   Oral   predniSONE   Tablet 50 milliGRAM(s) Oral daily    chlorhexidine 2% Cloths 1 Application(s) Topical <User Schedule>  multivitamin/minerals 1 Tablet(s) Oral daily      PAST MEDICAL/SURGICAL HISTORY  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    Deep Vein Thrombosis (DVT)  17 yeras ago on Coumadin    CHF (congestive heart failure)  last exacerbation in 1/2017    Enlarged prostate    GERD (gastroesophageal reflux disease)    Hernia  umblical    Calculus of bile duct without cholangitis or cholecystitis without obstruction    Atrial fibrillation    S/P Hernia Repair    S/P cataract surgery, unspecified laterality    S/P ERCP  3/2017        SOCIAL HISTORY: Substance Use (street drugs): ( x ) never used  (  ) other:    FAMILY HISTORY:            PHYSICAL EXAM:  T(C): 36.4 (04-15-22 @ 21:00), Max: 36.6 (04-15-22 @ 17:42)  HR: 76 (04-15-22 @ 21:00) (60 - 93)  BP: 151/73 (04-15-22 @ 21:00) (150/66 - 195/80)  RR: 18 (04-15-22 @ 21:00) (13 - 25)  SpO2: 95% (04-15-22 @ 21:00) (88% - 100%)  Wt(kg): --  I&O's Summary    14 Apr 2022 07:01  -  15 Apr 2022 07:00  --------------------------------------------------------  IN: 1279 mL / OUT: 1470 mL / NET: -191 mL    15 Apr 2022 07:01  -  15 Apr 2022 22:54  --------------------------------------------------------  IN: 1583 mL / OUT: 1850 mL / NET: -267 mL             EYES:   PERRLA   ENMT:   Moist mucous membranes, Good dentition, No lesions  Cardiovascular: Normal S1 S2, No JVD, No murmurs, No edema  Respiratory: b/l rhonchi   Gastrointestinal:  Soft, Non-tender, + BS	  Extremities: s/p BKA                                     7.9    20.97 )-----------( 320      ( 15 Apr 2022 01:11 )             25.4     04-15    145  |  112<H>  |  96<H>  ----------------------------<  111<H>  5.0   |  19<L>  |  1.87<H>    Ca    9.1      15 Apr 2022 01:11  Phos  4.4     04-15  Mg     2.6     04-15      proBNP:   Lipid Profile:   HgA1c:   TSH:     Consultant(s) Notes Reviewed:  [x ] YES  [ ] NO    Care Discussed with Consultants/Other Providers [ x] YES  [ ] NO    Imaging Personally Reviewed independently:  [x] YES  [ ] NO    All labs, radiologic studies, vitals, orders and medications list reviewed. Patient is seen and examined at bedside. Case discussed with medical team.

## 2022-04-15 NOTE — PROGRESS NOTE ADULT - SUBJECTIVE AND OBJECTIVE BOX
Patient is a 87y old  Male who presents with a chief complaint of Preoperative Planning for Right above knee amputation (15 Apr 2022 15:59)      SUBJECTIVE / OVERNIGHT EVENTS: off insulin drip, now on lantus and prandial admelog. awaiting transfer to floor, on prednisone taper    MEDICATIONS  (STANDING):  acetaminophen     Tablet .. 975 milliGRAM(s) Oral every 8 hours  albuterol/ipratropium for Nebulization 3 milliLiter(s) Nebulizer every 6 hours  atorvastatin 40 milliGRAM(s) Oral at bedtime  buDESOnide    Inhalation Suspension 0.5 milliGRAM(s) Inhalation every 12 hours  chlorhexidine 2% Cloths 1 Application(s) Topical <User Schedule>  finasteride 5 milliGRAM(s) Oral daily  furosemide   Injectable 40 milliGRAM(s) IV Push every 24 hours  gabapentin 300 milliGRAM(s) Oral every 8 hours  guaiFENesin ER 1200 milliGRAM(s) Oral every 12 hours  heparin  Infusion 1500 Unit(s)/Hr (15 mL/Hr) IV Continuous <Continuous>  insulin glargine Injectable (LANTUS) 40 Unit(s) SubCutaneous at bedtime  insulin glargine Injectable (LANTUS) 40 Unit(s) SubCutaneous at bedtime  insulin lispro (ADMELOG) corrective regimen sliding scale   SubCutaneous three times a day before meals  insulin lispro (ADMELOG) corrective regimen sliding scale   SubCutaneous at bedtime  insulin lispro Injectable (ADMELOG) 15 Unit(s) SubCutaneous three times a day before meals  levothyroxine 25 MICROGram(s) Oral daily  metoprolol succinate ER 25 milliGRAM(s) Oral daily  montelukast 10 milliGRAM(s) Oral daily  multivitamin/minerals 1 Tablet(s) Oral daily  pantoprazole    Tablet 40 milliGRAM(s) Oral before breakfast  piperacillin/tazobactam IVPB.. 3.375 Gram(s) IV Intermittent every 8 hours  polyethylene glycol 3350 17 Gram(s) Oral daily  predniSONE   Tablet   Oral   predniSONE   Tablet 50 milliGRAM(s) Oral daily  senna 2 Tablet(s) Oral at bedtime  tamsulosin 0.8 milliGRAM(s) Oral at bedtime    MEDICATIONS  (PRN):  oxyCODONE    IR 5 milliGRAM(s) Oral every 4 hours PRN Moderate Pain (4 - 6)      Vital Signs Last 24 Hrs  T(F): 97.4 (04-15-22 @ 13:07), Max: 98.4 (22 @ 20:00)  HR: 71 (04-15-22 @ 13:07) (60 - 93)  BP: 153/69 (04-15-22 @ 13:07) (142/65 - 195/80)  RR: 16 (04-15-22 @ 13:07) (13 - 25)  SpO2: 95% (04-15-22 @ 13:07) (88% - 100%)  Telemetry:   CAPILLARY BLOOD GLUCOSE      POCT Blood Glucose.: 103 mg/dL (15 Apr 2022 13:12)  POCT Blood Glucose.: 111 mg/dL (15 Apr 2022 11:40)  POCT Blood Glucose.: 173 mg/dL (15 Apr 2022 06:56)  POCT Blood Glucose.: 109 mg/dL (15 Apr 2022 03:29)  POCT Blood Glucose.: 96 mg/dL (15 Apr 2022 02:38)  POCT Blood Glucose.: 110 mg/dL (15 Apr 2022 01:20)  POCT Blood Glucose.: 119 mg/dL (15 Apr 2022 00:39)  POCT Blood Glucose.: 134 mg/dL (2022 23:04)  POCT Blood Glucose.: 154 mg/dL (2022 22:22)  POCT Blood Glucose.: 150 mg/dL (2022 21:08)  POCT Blood Glucose.: 158 mg/dL (2022 20:10)  POCT Blood Glucose.: 154 mg/dL (2022 18:56)  POCT Blood Glucose.: 131 mg/dL (2022 18:03)  POCT Blood Glucose.: 149 mg/dL (2022 17:06)    I&O's Summary    2022 07:01  -  15 Apr 2022 07:00  --------------------------------------------------------  IN: 1279 mL / OUT: 1470 mL / NET: -191 mL    15 Apr 2022 07:01  -  15 Apr 2022 16:17  --------------------------------------------------------  IN: 698 mL / OUT: 450 mL / NET: 248 mL        PHYSICAL EXAM:  GENERAL: NAD, well-developed  HEAD:  Atraumatic, Normocephalic  EYES: EOMI, PERRLA, conjunctiva and sclera clear  NECK: Supple, No JVD  CHEST/LUNG: Clear to auscultation bilaterally; No wheeze  HEART: Regular rate and rhythm; No murmurs, rubs, or gallops  ABDOMEN: Soft, Nontender, Nondistended; Bowel sounds present  EXTREMITIES:  2+ Peripheral Pulses, No clubbing, cyanosis, or edema  PSYCH: AAOx3  NEUROLOGY: non-focal  SKIN: No rashes or lesions    LABS:                        7.9    20.97 )-----------( 320      ( 15 Apr 2022 01:11 )             25.4     04-15    145  |  112<H>  |  96<H>  ----------------------------<  111<H>  5.0   |  19<L>  |  1.87<H>    Ca    9.1      15 Apr 2022 01:11  Phos  4.4     04-15  Mg     2.6     04-15      PT/INR - ( 15 Apr 2022 01:11 )   PT: 12.6 sec;   INR: 1.09 ratio         PTT - ( 15 Apr 2022 15:20 )  PTT:72.9 sec      Urinalysis Basic - ( 2022 06:05 )    Color: Light Yellow / Appearance: Clear / S.021 / pH: x  Gluc: x / Ketone: Negative  / Bili: Negative / Urobili: Negative   Blood: x / Protein: Trace / Nitrite: Negative   Leuk Esterase: Moderate / RBC: 185 /hpf / WBC 12 /HPF   Sq Epi: x / Non Sq Epi: 2 /hpf / Bacteria: Negative        RADIOLOGY & ADDITIONAL TESTS:    Imaging Personally Reviewed:    Consultant(s) Notes Reviewed:      Care Discussed with Consultants/Other Providers:

## 2022-04-16 NOTE — PROGRESS NOTE ADULT - SUBJECTIVE AND OBJECTIVE BOX
[facility] : Progress Note :: [phone], (office [phone]),   Dr Mora / Dr Lee / Dr Jang / Dr Wynne / Dr Antonia HARKINS / Dr Melendez / Dr Wall / Dr Den butt  _____________________________________________________________________________________________    offers no complains     No Known Allergies    Hospital Medications:   MEDICATIONS  (STANDING):  acetaminophen     Tablet .. 975 milliGRAM(s) Oral every 8 hours  albuterol/ipratropium for Nebulization 3 milliLiter(s) Nebulizer every 6 hours  atorvastatin 40 milliGRAM(s) Oral at bedtime  buDESOnide    Inhalation Suspension 0.5 milliGRAM(s) Inhalation every 12 hours  chlorhexidine 2% Cloths 1 Application(s) Topical <User Schedule>  finasteride 5 milliGRAM(s) Oral daily  furosemide   Injectable 40 milliGRAM(s) IV Push every 24 hours  gabapentin 300 milliGRAM(s) Oral every 8 hours  guaiFENesin ER 1200 milliGRAM(s) Oral every 12 hours  heparin  Infusion 1500 Unit(s)/Hr (15 mL/Hr) IV Continuous <Continuous>  insulin glargine Injectable (LANTUS) 40 Unit(s) SubCutaneous at bedtime  insulin lispro (ADMELOG) corrective regimen sliding scale   SubCutaneous three times a day before meals  insulin lispro (ADMELOG) corrective regimen sliding scale   SubCutaneous at bedtime  insulin lispro Injectable (ADMELOG) 15 Unit(s) SubCutaneous three times a day before meals  levothyroxine 25 MICROGram(s) Oral daily  metoprolol succinate ER 25 milliGRAM(s) Oral daily  montelukast 10 milliGRAM(s) Oral daily  multivitamin/minerals 1 Tablet(s) Oral daily  pantoprazole    Tablet 40 milliGRAM(s) Oral before breakfast  piperacillin/tazobactam IVPB.. 3.375 Gram(s) IV Intermittent every 8 hours  polyethylene glycol 3350 17 Gram(s) Oral daily  predniSONE   Tablet   Oral   senna 2 Tablet(s) Oral at bedtime  tamsulosin 0.8 milliGRAM(s) Oral at bedtime        VITALS:  T(F): 97.5 (22 @ 09:07), Max: 97.8 (04-15-22 @ 17:42)  HR: 67 (22 @ 09:07)  BP: 131/76 (22 @ 09:07)  RR: 18 (22 @ 09:07)  SpO2: 95% (22 @ 09:07)  Wt(kg): --    04-15 @ 07:01  -   @ 07:00  --------------------------------------------------------  IN: 1803 mL / OUT: 3210 mL / NET: -1407 mL     @ 07:01  -   @ 11:51  --------------------------------------------------------  IN: 180 mL / OUT: 250 mL / NET: -70 mL        PHYSICAL EXAM:  Constitutional: NAD  HEENT: anicteric sclera, oropharynx clear.  Neck: No JVD  Respiratory: CTAB, no wheezes, rales or rhonchi  Cardiovascular: S1, S2, RRR  Gastrointestinal: BS+, soft, NT/ND  Extremities: No peripheral edema  Neurological: A/O x 3, no focal deficits  : No CVA tenderness.  hudson+      LABS:      149<H>  |  112<H>  |  91<H>  ----------------------------<  136<H>  4.1   |  20<L>  |  1.62<H>    Ca    8.8      2022 07:11  Phos  5.0       Mg     2.6           Creatinine Trend: 1.62 <--, 1.87 <--, 1.82 <--, 1.59 <--, 1.75 <--, 1.87 <--, 1.78 <--, 1.73 <--, 1.82 <--                        8.4    17.75 )-----------( 287      ( 2022 07:11 )             29.0     Urine Studies:  Urinalysis Basic - ( 2022 06:05 )    Color: Light Yellow / Appearance: Clear / S.021 / pH:   Gluc:  / Ketone: Negative  / Bili: Negative / Urobili: Negative   Blood:  / Protein: Trace / Nitrite: Negative   Leuk Esterase: Moderate / RBC: 185 /hpf / WBC 12 /HPF   Sq Epi:  / Non Sq Epi: 2 /hpf / Bacteria: Negative        RADIOLOGY & ADDITIONAL STUDIES:

## 2022-04-16 NOTE — PROGRESS NOTE ADULT - SUBJECTIVE AND OBJECTIVE BOX
Patient is a 87y old  Male who presents with a chief complaint of Preoperative Planning for Right above knee amputation (16 Apr 2022 11:51)      SUBJECTIVE / OVERNIGHT EVENTS:    MEDICATIONS  (STANDING):  acetaminophen     Tablet .. 975 milliGRAM(s) Oral every 8 hours  albuterol/ipratropium for Nebulization 3 milliLiter(s) Nebulizer every 6 hours  atorvastatin 40 milliGRAM(s) Oral at bedtime  buDESOnide    Inhalation Suspension 0.5 milliGRAM(s) Inhalation every 12 hours  chlorhexidine 2% Cloths 1 Application(s) Topical <User Schedule>  finasteride 5 milliGRAM(s) Oral daily  furosemide   Injectable 40 milliGRAM(s) IV Push every 24 hours  gabapentin 300 milliGRAM(s) Oral every 8 hours  guaiFENesin ER 1200 milliGRAM(s) Oral every 12 hours  heparin  Infusion 1500 Unit(s)/Hr (15 mL/Hr) IV Continuous <Continuous>  insulin glargine Injectable (LANTUS) 40 Unit(s) SubCutaneous at bedtime  insulin lispro (ADMELOG) corrective regimen sliding scale   SubCutaneous three times a day before meals  insulin lispro (ADMELOG) corrective regimen sliding scale   SubCutaneous at bedtime  levothyroxine 25 MICROGram(s) Oral daily  metoprolol succinate ER 25 milliGRAM(s) Oral daily  montelukast 10 milliGRAM(s) Oral daily  multivitamin/minerals 1 Tablet(s) Oral daily  pantoprazole    Tablet 40 milliGRAM(s) Oral before breakfast  piperacillin/tazobactam IVPB.. 3.375 Gram(s) IV Intermittent every 8 hours  polyethylene glycol 3350 17 Gram(s) Oral daily  predniSONE   Tablet   Oral   senna 2 Tablet(s) Oral at bedtime  tamsulosin 0.8 milliGRAM(s) Oral at bedtime    MEDICATIONS  (PRN):  traMADol 25 milliGRAM(s) Oral every 4 hours PRN Moderate Pain (4 - 6)  traMADol 50 milliGRAM(s) Oral every 4 hours PRN Severe Pain (7 - 10)      Vital Signs Last 24 Hrs  T(F): 98.4 (04-16-22 @ 17:45), Max: 98.4 (04-16-22 @ 17:45)  HR: 83 (04-16-22 @ 17:45) (67 - 83)  BP: 158/71 (04-16-22 @ 17:45) (131/76 - 159/69)  RR: 18 (04-16-22 @ 17:45) (18 - 20)  SpO2: 92% (04-16-22 @ 17:45) (92% - 97%)  Telemetry:   CAPILLARY BLOOD GLUCOSE      POCT Blood Glucose.: 117 mg/dL (16 Apr 2022 17:10)  POCT Blood Glucose.: 206 mg/dL (16 Apr 2022 13:15)  POCT Blood Glucose.: 177 mg/dL (16 Apr 2022 08:56)  POCT Blood Glucose.: 148 mg/dL (15 Apr 2022 21:12)    I&O's Summary    15 Apr 2022 07:01  -  16 Apr 2022 07:00  --------------------------------------------------------  IN: 1803 mL / OUT: 3210 mL / NET: -1407 mL    16 Apr 2022 07:01  -  16 Apr 2022 18:47  --------------------------------------------------------  IN: 360 mL / OUT: 1251 mL / NET: -891 mL        PHYSICAL EXAM:  GENERAL: NAD, well-developed  HEAD:  Atraumatic, Normocephalic  EYES: EOMI, PERRLA, conjunctiva and sclera clear  NECK: Supple, No JVD  CHEST/LUNG: Clear to auscultation bilaterally; No wheeze  HEART: Regular rate and rhythm; No murmurs, rubs, or gallops  ABDOMEN: Soft, Nontender, Nondistended; Bowel sounds present  EXTREMITIES:  2+ Peripheral Pulses, No clubbing, cyanosis, or edema  PSYCH: AAOx3  NEUROLOGY: non-focal  SKIN: No rashes or lesions    LABS:                        8.4    17.75 )-----------( 287      ( 16 Apr 2022 07:11 )             29.0     04-16    149<H>  |  112<H>  |  91<H>  ----------------------------<  136<H>  4.1   |  20<L>  |  1.62<H>    Ca    8.8      16 Apr 2022 07:11  Phos  5.0     04-16  Mg     2.6     04-16      PT/INR - ( 16 Apr 2022 07:11 )   PT: 12.1 sec;   INR: 1.05 ratio         PTT - ( 16 Apr 2022 07:11 )  PTT:85.6 sec          RADIOLOGY & ADDITIONAL TESTS:    Imaging Personally Reviewed:    Consultant(s) Notes Reviewed:      Care Discussed with Consultants/Other Providers:

## 2022-04-16 NOTE — PROGRESS NOTE ADULT - SUBJECTIVE AND OBJECTIVE BOX
Ritesh Bell MD  Interventional Cardiology / Advance Heart Failure and Cardiac Transplant Specialist  [place] Office : [phone] [place] [address] [place]. [address]  Tel:   [place] Office : [address] [facility] [place] [address]  Tel: [phone]      Subjective/Overnight events: Pt is lying in bed comfortable not in distress  	  MEDICATIONS:  furosemide   Injectable 40 milliGRAM(s) IV Push every 24 hours  heparin  Infusion 1500 Unit(s)/Hr IV Continuous <Continuous>  metoprolol succinate ER 25 milliGRAM(s) Oral daily  tamsulosin 0.8 milliGRAM(s) Oral at bedtime    piperacillin/tazobactam IVPB.. 3.375 Gram(s) IV Intermittent every 8 hours    albuterol/ipratropium for Nebulization 3 milliLiter(s) Nebulizer every 6 hours  buDESOnide    Inhalation Suspension 0.5 milliGRAM(s) Inhalation every 12 hours  guaiFENesin ER 1200 milliGRAM(s) Oral every 12 hours  montelukast 10 milliGRAM(s) Oral daily    acetaminophen     Tablet .. 975 milliGRAM(s) Oral every 8 hours  gabapentin 300 milliGRAM(s) Oral every 8 hours  oxyCODONE    IR 5 milliGRAM(s) Oral every 4 hours PRN    pantoprazole    Tablet 40 milliGRAM(s) Oral before breakfast  polyethylene glycol 3350 17 Gram(s) Oral daily  senna 2 Tablet(s) Oral at bedtime    atorvastatin 40 milliGRAM(s) Oral at bedtime  finasteride 5 milliGRAM(s) Oral daily  insulin glargine Injectable (LANTUS) 40 Unit(s) SubCutaneous at bedtime  insulin lispro (ADMELOG) corrective regimen sliding scale   SubCutaneous three times a day before meals  insulin lispro (ADMELOG) corrective regimen sliding scale   SubCutaneous at bedtime  insulin lispro Injectable (ADMELOG) 15 Unit(s) SubCutaneous three times a day before meals  levothyroxine 25 MICROGram(s) Oral daily  predniSONE   Tablet   Oral     chlorhexidine 2% Cloths 1 Application(s) Topical <User Schedule>  multivitamin/minerals 1 Tablet(s) Oral daily      PAST MEDICAL/SURGICAL HISTORY  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    Deep Vein Thrombosis (DVT)  17 yeras ago on Coumadin    CHF (congestive heart failure)  last exacerbation in 1/2017    Enlarged prostate    GERD (gastroesophageal reflux disease)    Hernia  umblical    Calculus of bile duct without cholangitis or cholecystitis without obstruction    Atrial fibrillation    S/P Hernia Repair    S/P cataract surgery, unspecified laterality    S/P ERCP  3/2017        SOCIAL HISTORY: Substance Use (street drugs): ( x ) never used  (  ) other:    FAMILY HISTORY:          PHYSICAL EXAM:  T(C): 36.4 (04-16-22 @ 09:07), Max: 36.6 (04-15-22 @ 17:42)  HR: 67 (04-16-22 @ 09:07) (60 - 76)  BP: 131/76 (04-16-22 @ 09:07) (131/76 - 164/69)  RR: 18 (04-16-22 @ 09:07) (13 - 20)  SpO2: 95% (04-16-22 @ 09:07) (92% - 100%)  Wt(kg): --  I&O's Summary    15 Apr 2022 07:01  -  16 Apr 2022 07:00  --------------------------------------------------------  IN: 1803 mL / OUT: 3210 mL / NET: -1407 mL    16 Apr 2022 07:01  -  16 Apr 2022 10:06  --------------------------------------------------------  IN: 180 mL / OUT: 250 mL / NET: -70 mL        EYES:   PERRLA   ENMT:   Moist mucous membranes, Good dentition, No lesions  Cardiovascular: Normal S1 S2, No JVD, No murmurs, No edema  Respiratory: b/l rhonchi   Gastrointestinal:  Soft, Non-tender, + BS	  Extremities: s/p right BKA                                     8.4    17.75 )-----------( 287      ( 16 Apr 2022 07:11 )             29.0     04-16    149<H>  |  112<H>  |  91<H>  ----------------------------<  136<H>  4.1   |  20<L>  |  1.62<H>    Ca    8.8      16 Apr 2022 07:11  Phos  5.0     04-16  Mg     2.6     04-16      proBNP:   Lipid Profile:   HgA1c:   TSH:     Consultant(s) Notes Reviewed:  [x ] YES  [ ] NO    Care Discussed with Consultants/Other Providers [ x] YES  [ ] NO    Imaging Personally Reviewed independently:  [x] YES  [ ] NO    All labs, radiologic studies, vitals, orders and medications list reviewed. Patient is seen and examined at bedside. Case discussed with medical team.

## 2022-04-16 NOTE — CHART NOTE - NSCHARTNOTEFT_GEN_A_CORE
BG values at/close to goal. Prednisone to be reduced to 40mg daily starting 4/17.   Recommend:  c/w Lantus 40 units QHS  Adjust Admelog 15-12-12 on 4/17  -c/w Admelog moderate correction scale AC and mod HS scale  Outpt. endo follow-up  Outpt. optho, podiatry, nephrology  Plan to d/c on basal + orals including SGLT2 given CKD. Would avoid metformin and sulfonylurea.      Can be reached via TEAMS/pager: [phone]   office:  [phone] (M-F 9a-5pm)               [phone] (nights/weekends)   [email or web address] password [email or web address]

## 2022-04-16 NOTE — PROGRESS NOTE ADULT - ASSESSMENT
86M PMH HTN, HLD, DM2 and nonhealing wounds of RLE who is non-ambulatory at home now s/p right guillotine BELOW knee amputation. Postoperative course c/b severe COPD exacerbation requiring excalation of O2 supplementation with HFNC. SICU consulted for close respiratory monitoring in the setting of COPD exacerbation. Overnight attempted weaning of insulin gtt but unsuccessful. Concern for CVA however CT negative. Blood cx sent, zosyn started.     PLAN  - Continue hep gtt, goal aPTT 59-99  - zosyn  - Pain control  - Regular diet  - Glucose control  - Cont vac changes M W F  - No plan for AKA on this admission.      Vascular Surgery  [id number]              - Appreciate excellent SICU care    Vascular Surgery  [id number]       86M PMH HTN, HLD, DM2 and nonhealing wounds of RLE who is non-ambulatory at home now s/p right guillotine BELOW knee amputation. Postoperative course c/b severe COPD exacerbation requiring excalation of O2 supplementation with HFNC. SICU consulted for close respiratory monitoring in the setting of COPD exacerbation. Off insulin gtt, on room air.     - Continue hep gtt, goal aPTT 59-99  - zosyn  - Pain control  - Regular diet  - Glucose control  - Cont vac changes M W F  - No plan for AKA on this admission.    Vascular Surgery  [id number]              - Appreciate excellent SICU care    Vascular Surgery  [id number]

## 2022-04-16 NOTE — PROGRESS NOTE ADULT - ASSESSMENT
86 year old gentleman with a PMH DM, HTN, HLD who has been followed for the past 6 months for foot wounds and leg pain.      EKG -  A sense V paced PVC's  Echo - Mild LV dysfunction mild aortic stenosis    1) Post op assessment   -pt has h/o moderate LV dysfunction as per echo 2017, no chest pains 2d echo now shows mild LV dysfunction  -12 lead EKG ok,  PPM interrogated  -s/p BKA , pt wheezing b/l f/u pulm recs now on steroids,       2) HTN  -controlled  -c/w metoprolol  -continue to monitor BP    3) Chronic systolic CHF   - hold metolazone   -c/w IV lasix 40mg daily. monitor I&Os    4) ?Atrial fib   - coumadin on hold on IV heparin     5) KARLOS  - held losartan and metolazone  - renal function improving

## 2022-04-16 NOTE — PROGRESS NOTE ADULT - ASSESSMENT
87 yo male ex  smoker, h/o HTN, HLD, DM, CKD, CVA, CHF (mild systolic dysfunction) and atrial fibrillation with sick sinus syndrome s/p pacemaker implantation.  h/o COPD/ZACKARY, TOM colonization/infection, admitted on 4/1 with RLE wet gangrene, s/p R guillotine BKA, and is awaiting a staged right lower extremity AKA. on heparin gtt for PAD  post op course complicated by acute hypoxic respiratory failure 2/2 COPD exacerbation. now resolved  ptn seen in 8ICU  Ptn is now stable on po prednisone 50mg daily - taper by 10mg every 2 days   albuterol/atrovent nebs   pulmicort 0.5mg nebs q12h   mucinex 1200mg 2 times daily  singulair 10mg @ bedtime  acapella device/incentive spirometer  on RA 91-92% pulse Ox  steroid induced hyperglycemia, now off insulin drip, on Lantus and prandial Admelog  placed on Zosyn 2/2 possible sepsis after he was found with worsening mental status   its possible AMS on 4/13 was 2/2 analgesics prior to the VAC change on 4/13. since 4/14 MS back at baseline  CKD3/s/p KARLOS due to diuresis in the setting of euvolemia -> improved, renal following  HTN, systolic CHF, Afib stable, cardiology following

## 2022-04-16 NOTE — PROGRESS NOTE ADULT - SUBJECTIVE AND OBJECTIVE BOX
Vascular Surgery Progress Note    INTERVAL EVENTS:   No acute events overnight.    SUBJECTIVE: Patient seen and examined at bedside with surgical team    OBJECTIVE:    Vital Signs Last 24 Hrs  T(C): 36.6 (2022 01:34), Max: 36.6 (15 Apr 2022 17:42)  T(F): 97.8 (:34), Max: 97.8 (15 Apr 2022 17:42)  HR: 71 (:34) (60 - 93)  BP: 148/74 (:34) (148/74 - 195/80)  BP(mean): 103 (15 Apr 2022 12:00) (98 - 115)  RR: 18 (:34) (13 - 25)  SpO2: 93% (:34) (88% - 100%)I&O's Detail    2022 07:01  -  15 Apr 2022 07:00  --------------------------------------------------------  IN:    Heparin: 108 mL    Heparin: 309 mL    Insulin: 82 mL    IV PiggyBack: 280 mL    Oral Fluid: 500 mL  Total IN: 1279 mL    OUT:    Indwelling Catheter - Urethral (mL): 1470 mL  Total OUT: 1470 mL    Total NET: -191 mL      15 Apr 2022 07:01  -  2022 03:34  --------------------------------------------------------  IN:    Heparin: 33 mL    Heparin: 165 mL    IV PiggyBack: 175 mL    Oral Fluid: 1210 mL  Total IN: 1583 mL    OUT:    Indwelling Catheter - Urethral (mL): 2850 mL    Stool (mL): 0 mL    VAC (Vacuum Assisted Closure) System (mL): 0 mL  Total OUT: 2850 mL    Total NET: -1267 mL      MEDICATIONS  (STANDING):  acetaminophen     Tablet .. 975 milliGRAM(s) Oral every 8 hours  albuterol/ipratropium for Nebulization 3 milliLiter(s) Nebulizer every 6 hours  atorvastatin 40 milliGRAM(s) Oral at bedtime  buDESOnide    Inhalation Suspension 0.5 milliGRAM(s) Inhalation every 12 hours  chlorhexidine 2% Cloths 1 Application(s) Topical <User Schedule>  finasteride 5 milliGRAM(s) Oral daily  furosemide   Injectable 40 milliGRAM(s) IV Push every 24 hours  gabapentin 300 milliGRAM(s) Oral every 8 hours  guaiFENesin ER 1200 milliGRAM(s) Oral every 12 hours  heparin  Infusion 1500 Unit(s)/Hr (15 mL/Hr) IV Continuous <Continuous>  insulin glargine Injectable (LANTUS) 40 Unit(s) SubCutaneous at bedtime  insulin lispro (ADMELOG) corrective regimen sliding scale   SubCutaneous three times a day before meals  insulin lispro (ADMELOG) corrective regimen sliding scale   SubCutaneous at bedtime  insulin lispro Injectable (ADMELOG) 15 Unit(s) SubCutaneous three times a day before meals  levothyroxine 25 MICROGram(s) Oral daily  metoprolol succinate ER 25 milliGRAM(s) Oral daily  montelukast 10 milliGRAM(s) Oral daily  multivitamin/minerals 1 Tablet(s) Oral daily  pantoprazole    Tablet 40 milliGRAM(s) Oral before breakfast  piperacillin/tazobactam IVPB.. 3.375 Gram(s) IV Intermittent every 8 hours  polyethylene glycol 3350 17 Gram(s) Oral daily  predniSONE   Tablet   Oral   predniSONE   Tablet 50 milliGRAM(s) Oral daily  senna 2 Tablet(s) Oral at bedtime  tamsulosin 0.8 milliGRAM(s) Oral at bedtime    MEDICATIONS  (PRN):  oxyCODONE    IR 5 milliGRAM(s) Oral every 4 hours PRN Moderate Pain (4 - 6)      PHYSICAL EXAM:  Constitutional: A&Ox3, NAD  Respiratory: Unlabored breathing  Abdomen:   Extremities:     LABS:                        7.9    20.97 )-----------( 320      ( 15 Apr 2022 01:11 )             25.4     04-15    145  |  112<H>  |  96<H>  ----------------------------<  111<H>  5.0   |  19<L>  |  1.87<H>    Ca    9.1      15 Apr 2022 01:11  Phos  4.4     04-15  Mg     2.6     04-15      PT/INR - ( 15 Apr 2022 01:11 )   PT: 12.6 sec;   INR: 1.09 ratio         PTT - ( 15 Apr 2022 22:42 )  PTT:77.6 sec    Urinalysis Basic - ( 2022 06:05 )    Color: Light Yellow / Appearance: Clear / S.021 / pH: x  Gluc: x / Ketone: Negative  / Bili: Negative / Urobili: Negative   Blood: x / Protein: Trace / Nitrite: Negative   Leuk Esterase: Moderate / RBC: 185 /hpf / WBC 12 /HPF   Sq Epi: x / Non Sq Epi: 2 /hpf / Bacteria: Negative          IMAGING:     Vascular Surgery Progress Note    INTERVAL EVENTS:   No acute events overnight.    SUBJECTIVE: Patient seen and examined at bedside with surgical team. Denies any new complaints.     OBJECTIVE:    Vital Signs Last 24 Hrs  T(C): 36.6 (2022 01:34), Max: 36.6 (15 Apr 2022 17:42)  T(F): 97.8 (:34), Max: 97.8 (15 Apr 2022 17:42)  HR: 71 (:34) (60 - 93)  BP: 148/74 (:34) (148/74 - 195/80)  BP(mean): 103 (15 Apr 2022 12:00) (98 - 115)  RR: 18 (:34) (13 - 25)  SpO2: 93% (:34) (88% - 100%)I&O's Detail    2022 07:01  -  15 Apr 2022 07:00  --------------------------------------------------------  IN:    Heparin: 108 mL    Heparin: 309 mL    Insulin: 82 mL    IV PiggyBack: 280 mL    Oral Fluid: 500 mL  Total IN: 1279 mL    OUT:    Indwelling Catheter - Urethral (mL): 1470 mL  Total OUT: 1470 mL    Total NET: -191 mL      15 Apr 2022 07:01  -  2022 03:34  --------------------------------------------------------  IN:    Heparin: 33 mL    Heparin: 165 mL    IV PiggyBack: 175 mL    Oral Fluid: 1210 mL  Total IN: 1583 mL    OUT:    Indwelling Catheter - Urethral (mL): 2850 mL    Stool (mL): 0 mL    VAC (Vacuum Assisted Closure) System (mL): 0 mL  Total OUT: 2850 mL    Total NET: -1267 mL      MEDICATIONS  (STANDING):  acetaminophen     Tablet .. 975 milliGRAM(s) Oral every 8 hours  albuterol/ipratropium for Nebulization 3 milliLiter(s) Nebulizer every 6 hours  atorvastatin 40 milliGRAM(s) Oral at bedtime  buDESOnide    Inhalation Suspension 0.5 milliGRAM(s) Inhalation every 12 hours  chlorhexidine 2% Cloths 1 Application(s) Topical <User Schedule>  finasteride 5 milliGRAM(s) Oral daily  furosemide   Injectable 40 milliGRAM(s) IV Push every 24 hours  gabapentin 300 milliGRAM(s) Oral every 8 hours  guaiFENesin ER 1200 milliGRAM(s) Oral every 12 hours  heparin  Infusion 1500 Unit(s)/Hr (15 mL/Hr) IV Continuous <Continuous>  insulin glargine Injectable (LANTUS) 40 Unit(s) SubCutaneous at bedtime  insulin lispro (ADMELOG) corrective regimen sliding scale   SubCutaneous three times a day before meals  insulin lispro (ADMELOG) corrective regimen sliding scale   SubCutaneous at bedtime  insulin lispro Injectable (ADMELOG) 15 Unit(s) SubCutaneous three times a day before meals  levothyroxine 25 MICROGram(s) Oral daily  metoprolol succinate ER 25 milliGRAM(s) Oral daily  montelukast 10 milliGRAM(s) Oral daily  multivitamin/minerals 1 Tablet(s) Oral daily  pantoprazole    Tablet 40 milliGRAM(s) Oral before breakfast  piperacillin/tazobactam IVPB.. 3.375 Gram(s) IV Intermittent every 8 hours  polyethylene glycol 3350 17 Gram(s) Oral daily  predniSONE   Tablet   Oral   predniSONE   Tablet 50 milliGRAM(s) Oral daily  senna 2 Tablet(s) Oral at bedtime  tamsulosin 0.8 milliGRAM(s) Oral at bedtime    MEDICATIONS  (PRN):  oxyCODONE    IR 5 milliGRAM(s) Oral every 4 hours PRN Moderate Pain (4 - 6)      PHYSICAL EXAM:  Constitutional: A&Ox3, NAD  Respiratory: Unlabored breathing  Abdomen: abd soft, nd, nttp  Extremities: RLE ace dressing clean, vac in place, b/l boots in place    LABS:                        7.9    20.97 )-----------( 320      ( 15 Apr 2022 01:11 )             25.4     04-15    145  |  112<H>  |  96<H>  ----------------------------<  111<H>  5.0   |  19<L>  |  1.87<H>    Ca    9.1      15 Apr 2022 01:11  Phos  4.4     04-15  Mg     2.6     04-15      PT/INR - ( 15 Apr 2022 01:11 )   PT: 12.6 sec;   INR: 1.09 ratio         PTT - ( 15 Apr 2022 22:42 )  PTT:77.6 sec    Urinalysis Basic - ( 2022 06:05 )    Color: Light Yellow / Appearance: Clear / S.021 / pH: x  Gluc: x / Ketone: Negative  / Bili: Negative / Urobili: Negative   Blood: x / Protein: Trace / Nitrite: Negative   Leuk Esterase: Moderate / RBC: 185 /hpf / WBC 12 /HPF   Sq Epi: x / Non Sq Epi: 2 /hpf / Bacteria: Negative          IMAGING:

## 2022-04-16 NOTE — PROGRESS NOTE ADULT - ASSESSMENT
86M with PMH of COPD (not on home o2), prior smoking history (40 pack years), HTN, HLD, Afib, CHF, T2DM, CVA, BPH, and PAD admitted for elective RLE AKA. Renal consulted for CKD Mx.    KARLOS on CKD 3,   baseline Cr ~1.7  serum k stable  bicarb stable  rise likely 2/2 hemodynamic changes--> ATN  c/w hudson-- good urine output ( 3L/ in past 24 hrs)  cont monitor Serum Creatinine   cont  lasix 40mg iv qd for now given lower ext edema  mild hypernatremia- tolerating PO intake, will monitor with AM labs   off losartan   monitor BMP daily and u/o   dose all meds for eGFR  avoid NSAIDs/Nephrotoxics.      Rt LE nonhealing wound:  s/p amputation  follow up with vascular          Dr Mora  [phone]

## 2022-04-17 NOTE — PROGRESS NOTE ADULT - ASSESSMENT
86M PMH HTN, HLD, DM2 and nonhealing wounds of RLE who is non-ambulatory at home now s/p right guillotine BELOW knee amputation. Postoperative course c/b severe COPD exacerbation requiring excalation of O2 supplementation with HFNC. SICU consulted for close respiratory monitoring in the setting of COPD exacerbation. Off insulin gtt, on room air.     - Continue hep gtt, goal aPTT 59-99  - zosyn  - Pain control  - Regular diet  - Glucose control  - Cont vac changes M W F  - No plan for AKA on this admission.    Vascular Surgery  [id number]              - Appreciate excellent SICU care    Vascular Surgery  2379       86M PMH HTN, HLD, DM2 and nonhealing wounds of RLE who is non-ambulatory at home now s/p right guillotine BELOW knee amputation. Postoperative course c/b severe COPD exacerbation requiring excalation of O2 supplementation with HFNC. SICU consulted for close respiratory monitoring in the setting of COPD exacerbation. Off insulin gtt, on room air.     Plan:  - Continue wound vac changes M/W/F  - Continue hep gtt, goal aPTT 59-99  - Continue IV abx: Zosyn  - Pain control  - Consistent carb diet  - Insulin sliding scale, basal + pre-meal insulin  - Bowel regimen  - Continue steroid taper, respiratory treatment  - Replete electrolytes prn  - Appreciate cards, pulm, neprho, and medicine recs  - Dispo: Will discuss transitioning to oral AC and abx in preparation for discharge planning soon; no plan for AKA on this admission, will plan for outpatient formalization      Vascular Surgery  [id number]

## 2022-04-17 NOTE — PROGRESS NOTE ADULT - SUBJECTIVE AND OBJECTIVE BOX
Ritesh Bell MD  Interventional Cardiology / Advance Heart Failure and Cardiac Transplant Specialist  [place] Office : [address] [place] [address] [place]. [address]  Tel:   [place] Office : [phone] [facility] [place] [address]  Tel: [phone]      Subjective/Overnight events: Pt is lying in bed comfortable not in distress  	  MEDICATIONS:  furosemide   Injectable 40 milliGRAM(s) IV Push every 24 hours  heparin  Infusion 1500 Unit(s)/Hr IV Continuous <Continuous>  metoprolol succinate ER 25 milliGRAM(s) Oral daily  tamsulosin 0.8 milliGRAM(s) Oral at bedtime    piperacillin/tazobactam IVPB.. 3.375 Gram(s) IV Intermittent every 8 hours    albuterol/ipratropium for Nebulization 3 milliLiter(s) Nebulizer every 6 hours  buDESOnide    Inhalation Suspension 0.5 milliGRAM(s) Inhalation every 12 hours  guaiFENesin ER 1200 milliGRAM(s) Oral every 12 hours  montelukast 10 milliGRAM(s) Oral daily    acetaminophen     Tablet .. 975 milliGRAM(s) Oral every 8 hours  gabapentin 300 milliGRAM(s) Oral every 8 hours  traMADol 25 milliGRAM(s) Oral every 4 hours PRN  traMADol 50 milliGRAM(s) Oral every 4 hours PRN    pantoprazole    Tablet 40 milliGRAM(s) Oral before breakfast  polyethylene glycol 3350 17 Gram(s) Oral daily  senna 2 Tablet(s) Oral at bedtime    atorvastatin 40 milliGRAM(s) Oral at bedtime  finasteride 5 milliGRAM(s) Oral daily  insulin glargine Injectable (LANTUS) 40 Unit(s) SubCutaneous at bedtime  insulin lispro (ADMELOG) corrective regimen sliding scale   SubCutaneous three times a day before meals  insulin lispro (ADMELOG) corrective regimen sliding scale   SubCutaneous at bedtime  insulin lispro Injectable (ADMELOG) 15 Unit(s) SubCutaneous before breakfast  insulin lispro Injectable (ADMELOG) 12 Unit(s) SubCutaneous before lunch  insulin lispro Injectable (ADMELOG) 12 Unit(s) SubCutaneous before dinner  levothyroxine 25 MICROGram(s) Oral daily  predniSONE   Tablet   Oral   predniSONE   Tablet 40 milliGRAM(s) Oral daily    chlorhexidine 2% Cloths 1 Application(s) Topical <User Schedule>  multivitamin/minerals 1 Tablet(s) Oral daily      PAST MEDICAL/SURGICAL HISTORY  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    Deep Vein Thrombosis (DVT)  17 yeras ago on Coumadin    CHF (congestive heart failure)  last exacerbation in 1/2017    Enlarged prostate    GERD (gastroesophageal reflux disease)    Hernia  umblical    Calculus of bile duct without cholangitis or cholecystitis without obstruction    Atrial fibrillation    S/P Hernia Repair    S/P cataract surgery, unspecified laterality    S/P ERCP  3/2017        SOCIAL HISTORY: Substance Use (street drugs): ( x ) never used  (  ) other:    FAMILY HISTORY:          PHYSICAL EXAM:  T(C): 36.3 (04-17-22 @ 05:08), Max: 36.9 (04-16-22 @ 17:45)  HR: 69 (04-17-22 @ 05:08) (69 - 83)  BP: 144/72 (04-17-22 @ 05:08) (144/72 - 159/69)  RR: 18 (04-17-22 @ 05:08) (18 - 18)  SpO2: 95% (04-17-22 @ 05:08) (91% - 97%)  Wt(kg): --  I&O's Summary    16 Apr 2022 07:01  -  17 Apr 2022 07:00  --------------------------------------------------------  IN: 1440 mL / OUT: 3201 mL / NET: -1761 mL          EYES:   PERRLA   ENMT:   Moist mucous membranes, Good dentition, No lesions  Cardiovascular: Normal S1 S2, No JVD, No murmurs, No edema  Respiratory: b/l rhonchi   Gastrointestinal:  Soft, Non-tender, + BS	  Extremities: s/p right BKA                                       8.4    19.99 )-----------( 302      ( 17 Apr 2022 07:30 )             28.5     04-17    149<H>  |  112<H>  |  86<H>  ----------------------------<  115<H>  3.9   |  20<L>  |  1.75<H>    Ca    8.7      17 Apr 2022 07:37  Phos  4.9     04-17  Mg     2.5     04-17      proBNP:   Lipid Profile:   HgA1c:   TSH:     Consultant(s) Notes Reviewed:  [x ] YES  [ ] NO    Care Discussed with Consultants/Other Providers [ x] YES  [ ] NO    Imaging Personally Reviewed independently:  [x] YES  [ ] NO    All labs, radiologic studies, vitals, orders and medications list reviewed. Patient is seen and examined at bedside. Case discussed with medical team.

## 2022-04-17 NOTE — PROGRESS NOTE ADULT - SUBJECTIVE AND OBJECTIVE BOX
VASCULAR SURGERY PROGRESS NOTE   ___________________________________________________________________    LIBRA PEÑA | 87y Male   [facility] 9MON [address]   1935 | [id number]     LOS 16d    Attending: Anmol Quinn    ___________________________________________________________________      SUBJECTIVE:   Patient seen today during morning rounds at bedside and found to be without acute distress. Denies any new complaints.     Allergies:  NKDA    OBJECTIVE:  Vitals:    T(C): 36.4 (22 @ 01:05), Max: 36.9 (22 @ 17:45)  HR: 76 (22 @ 01:05) (67 - 83)  BP: 150/70 (22 @ 01:05) (131/76 - 159/69)  RR: 18 (22 @ 01:05) (18 - 20)  SpO2: 91% (22 @ 01:05) (91% - 97%)      OUT:    Indwelling Catheter - Urethral (mL): 3210 mL    Stool (mL): 0 mL    VAC (Vacuum Assisted Closure) System (mL): 0 mL  Total OUT: 3210 mL      OUT:    Indwelling Catheter - Urethral (mL): 2650 mL    Stool (mL): 1 mL  Total OUT: 2651 mL          Medications:  acetaminophen     Tablet .. 975 milliGRAM(s) Oral every 8 hours  albuterol/ipratropium for Nebulization 3 milliLiter(s) Nebulizer every 6 hours  buDESOnide    Inhalation Suspension 0.5 milliGRAM(s) Inhalation every 12 hours  furosemide   Injectable 40 milliGRAM(s) IV Push every 24 hours  gabapentin 300 milliGRAM(s) Oral every 8 hours  guaiFENesin ER 1200 milliGRAM(s) Oral every 12 hours  metoprolol succinate ER 25 milliGRAM(s) Oral daily  montelukast 10 milliGRAM(s) Oral daily  pantoprazole    Tablet 40 milliGRAM(s) Oral before breakfast  polyethylene glycol 3350 17 Gram(s) Oral daily  senna 2 Tablet(s) Oral at bedtime  tamsulosin 0.8 milliGRAM(s) Oral at bedtime          Laboratory:  WBC: 17.75 H&H: 8.4/29.0 Plt: 287  WBC: 20.97 H&H: 7.9/25.4 Plt: 320    Chemistry:                               Phos: 5.0 M.6  149  |  112  |  91  ----------------------------<  136  4.1   |  20  |  1.62        , 04-15                             Phos: 4.4 M.6  145  |  112  |  96  ----------------------------<  111  5.0   |  19  |  1.87          PTT 85.6 PT/INR 12.1/1.05          Reviewed laboratory and imaging    heparin  Infusion 1500 IV Continuous <Continuous>  piperacillin/tazobactam IVPB.. 3.375 IV Intermittent every 8 hours      COVID-19 PCR: NotDetec (2022 18:23)        PHYSICAL EXAM:  Constitutional: A&Ox3, NAD  Respiratory: Unlabored breathing  Abdomen: abd soft, nd, nttp  Extremities: RLE ace dressing clean, vac in place, b/l boots in place     Vascular Surgery Progress Note    Subjective/24hour Events: Patient seen and examined at bedside on AM rounds. No acute events overnight. Pain controlled.      Vital Signs:  Vital Signs Last 24 Hrs  T(C): 36.3 (17 Apr 2022 05:08), Max: 36.9 (16 Apr 2022 17:45)  T(F): 97.4 (17 Apr 2022 05:08), Max: 98.4 (16 Apr 2022 17:45)  HR: 69 (17 Apr 2022 05:08) (69 - 83)  BP: 144/72 (17 Apr 2022 05:08) (144/72 - 159/69)  BP(mean): --  RR: 18 (17 Apr 2022 05:08) (18 - 18)  SpO2: 95% (17 Apr 2022 05:08) (91% - 97%)    CAPILLARY BLOOD GLUCOSE      POCT Blood Glucose.: 133 mg/dL (17 Apr 2022 07:55)  POCT Blood Glucose.: 139 mg/dL (16 Apr 2022 21:41)  POCT Blood Glucose.: 117 mg/dL (16 Apr 2022 17:10)  POCT Blood Glucose.: 206 mg/dL (16 Apr 2022 13:15)      I&O's Detail    16 Apr 2022 07:01  -  17 Apr 2022 07:00  --------------------------------------------------------  IN:    Heparin: 360 mL    Oral Fluid: 1080 mL  Total IN: 1440 mL    OUT:    Indwelling Catheter - Urethral (mL): 3200 mL    Stool (mL): 1 mL  Total OUT: 3201 mL    Total NET: -1761 mL          Physical Exam:  Gen: NAD, resting comfortably in bed  CV: Regular rate  Resp: Nonlabored breathing on room air  Ext: MARIAMA JAMES stump with wound vac in place holding appropriate suction, knee immobilizer in place, erythema improved around stump site        Labs:    04-17    149<H>  |  112<H>  |  86<H>  ----------------------------<  115<H>  3.9   |  20<L>  |  1.75<H>    Ca    8.7      17 Apr 2022 07:37  Phos  4.9     04-17  Mg     2.5     04-17                              8.4    19.99 )-----------( 302      ( 17 Apr 2022 07:30 )             28.5     PT/INR - ( 16 Apr 2022 07:11 )   PT: 12.1 sec;   INR: 1.05 ratio         PTT - ( 16 Apr 2022 07:11 )  PTT:85.6 sec

## 2022-04-17 NOTE — PROGRESS NOTE ADULT - ASSESSMENT
86 year old gentleman with a PMH DM, HTN, HLD who has been followed for the past 6 months for foot wounds and leg pain.      EKG -  A sense V paced PVC's  Echo - Mild LV dysfunction mild aortic stenosis    1) Post op assessment   -pt has h/o moderate LV dysfunction as per echo 2017, no chest pains 2d echo now shows mild LV dysfunction  -12 lead EKG ok,  PPM interrogated  -s/p BKA , pt had wheezing b/l f/u pulm recs now on steroids,       2) HTN  -controlled  -c/w metoprolol  -continue to monitor BP    3) Chronic systolic CHF   - hold metolazone   -c/w IV lasix 40mg daily. monitor I&Os    4) ?Atrial fib   - coumadin on hold on IV heparin     5) KARLOS  - held losartan and metolazone  -f/u renal

## 2022-04-17 NOTE — PROGRESS NOTE ADULT - ASSESSMENT
85 yo male ex  smoker, h/o HTN, HLD, DM, CKD, CVA, CHF (mild systolic dysfunction) and atrial fibrillation with sick sinus syndrome s/p pacemaker implantation.  h/o COPD/ZACKARY, TOM colonization/infection, admitted on 4/1 with RLE wet gangrene, s/p R guillotine BKA, and is awaiting a staged right lower extremity AKA. on heparin gtt for PAD  post op course complicated by acute hypoxic respiratory failure 2/2 COPD exacerbation. now resolved  ptn seen in 8ICU  Ptn is now stable on po prednisone 50mg daily - taper by 10mg every 2 days   albuterol/atrovent nebs   pulmicort 0.5mg nebs q12h   mucinex 1200mg 2 times daily  singulair 10mg @ bedtime  acapella device/incentive spirometer  on RA 91-92% pulse Ox  steroid induced hyperglycemia, now off insulin drip, on Lantus and prandial Admelog  placed on Zosyn 2/2 possible sepsis after he was found with worsening mental status   wbc remains elevated, would get ID consult  its possible AMS on 4/13 was 2/2 analgesics prior to the VAC change on 4/13. since 4/14 MS back at baseline  CKD3/s/p KARLOS due to diuresis in the setting of euvolemia -> improved, renal following  HTN, systolic CHF, Afib stable, cardiology following

## 2022-04-17 NOTE — PROGRESS NOTE ADULT - SUBJECTIVE AND OBJECTIVE BOX
Patient is a 87y old  Male who presents with a chief complaint of Preoperative Planning for Right above knee amputation (17 Apr 2022 09:42)      SUBJECTIVE / OVERNIGHT EVENTS: no new c/o, wbc remains elevated    MEDICATIONS  (STANDING):  acetaminophen     Tablet .. 975 milliGRAM(s) Oral every 8 hours  albuterol/ipratropium for Nebulization 3 milliLiter(s) Nebulizer every 6 hours  atorvastatin 40 milliGRAM(s) Oral at bedtime  buDESOnide    Inhalation Suspension 0.5 milliGRAM(s) Inhalation every 12 hours  chlorhexidine 2% Cloths 1 Application(s) Topical <User Schedule>  finasteride 5 milliGRAM(s) Oral daily  furosemide   Injectable 40 milliGRAM(s) IV Push every 24 hours  gabapentin 300 milliGRAM(s) Oral every 8 hours  guaiFENesin ER 1200 milliGRAM(s) Oral every 12 hours  heparin  Infusion 1500 Unit(s)/Hr (12 mL/Hr) IV Continuous <Continuous>  insulin glargine Injectable (LANTUS) 40 Unit(s) SubCutaneous at bedtime  insulin lispro (ADMELOG) corrective regimen sliding scale   SubCutaneous three times a day before meals  insulin lispro (ADMELOG) corrective regimen sliding scale   SubCutaneous at bedtime  insulin lispro Injectable (ADMELOG) 15 Unit(s) SubCutaneous before breakfast  insulin lispro Injectable (ADMELOG) 12 Unit(s) SubCutaneous before lunch  insulin lispro Injectable (ADMELOG) 12 Unit(s) SubCutaneous before dinner  levothyroxine 25 MICROGram(s) Oral daily  metoprolol succinate ER 25 milliGRAM(s) Oral daily  montelukast 10 milliGRAM(s) Oral daily  multivitamin/minerals 1 Tablet(s) Oral daily  pantoprazole    Tablet 40 milliGRAM(s) Oral before breakfast  piperacillin/tazobactam IVPB.. 3.375 Gram(s) IV Intermittent every 8 hours  polyethylene glycol 3350 17 Gram(s) Oral daily  predniSONE   Tablet   Oral   predniSONE   Tablet 40 milliGRAM(s) Oral daily  senna 2 Tablet(s) Oral at bedtime  tamsulosin 0.8 milliGRAM(s) Oral at bedtime    MEDICATIONS  (PRN):  traMADol 50 milliGRAM(s) Oral every 4 hours PRN Severe Pain (7 - 10)  traMADol 25 milliGRAM(s) Oral every 4 hours PRN Moderate Pain (4 - 6)      Vital Signs Last 24 Hrs  T(F): 97.4 (04-17-22 @ 13:43), Max: 98.4 (04-16-22 @ 17:45)  HR: 74 (04-17-22 @ 13:43) (66 - 83)  BP: 148/60 (04-17-22 @ 13:43) (128/64 - 158/71)  RR: 18 (04-17-22 @ 13:43) (18 - 18)  SpO2: 94% (04-17-22 @ 13:43) (91% - 95%)  Telemetry:   CAPILLARY BLOOD GLUCOSE      POCT Blood Glucose.: 146 mg/dL (17 Apr 2022 12:16)  POCT Blood Glucose.: 133 mg/dL (17 Apr 2022 07:55)  POCT Blood Glucose.: 139 mg/dL (16 Apr 2022 21:41)  POCT Blood Glucose.: 117 mg/dL (16 Apr 2022 17:10)    I&O's Summary    16 Apr 2022 07:01  -  17 Apr 2022 07:00  --------------------------------------------------------  IN: 1440 mL / OUT: 3201 mL / NET: -1761 mL    17 Apr 2022 07:01  -  17 Apr 2022 15:42  --------------------------------------------------------  IN: 582 mL / OUT: 750 mL / NET: -168 mL        PHYSICAL EXAM:  GENERAL: NAD, well-developed  HEAD:  Atraumatic, Normocephalic  EYES: EOMI, PERRLA, conjunctiva and sclera clear  NECK: Supple, No JVD  CHEST/LUNG: Clear to auscultation bilaterally; No wheeze  HEART: Regular rate and rhythm; No murmurs, rubs, or gallops  ABDOMEN: Soft, Nontender, Nondistended; Bowel sounds present  EXTREMITIES:  2+ Peripheral Pulses, No clubbing, cyanosis, or edema  PSYCH: AAOx3  NEUROLOGY: non-focal  SKIN: No rashes or lesions    LABS:                        8.4    19.99 )-----------( 302      ( 17 Apr 2022 07:30 )             28.5     04-17    149<H>  |  112<H>  |  86<H>  ----------------------------<  115<H>  3.9   |  20<L>  |  1.75<H>    Ca    8.7      17 Apr 2022 07:37  Phos  4.9     04-17  Mg     2.5     04-17      PT/INR - ( 16 Apr 2022 07:11 )   PT: 12.1 sec;   INR: 1.05 ratio         PTT - ( 17 Apr 2022 09:28 )  PTT:147.3 sec          RADIOLOGY & ADDITIONAL TESTS:    Imaging Personally Reviewed:    Consultant(s) Notes Reviewed:      Care Discussed with Consultants/Other Providers:

## 2022-04-18 NOTE — CONSULT NOTE ADULT - SUBJECTIVE AND OBJECTIVE BOX
Patient is a 87y old  Male who presents with a chief complaint of Preoperative Planning for Right above knee amputation (18 Apr 2022 15:42)      HPI:  CC: Sent in from office for right above knee amputation    HPI: Mr. Heart is an 86 year old gentleman with a PMH DM, HTN, HLD who has been followed for the past 6 months for foot wounds and leg pain. He underwent and angiogram with Dr. Quinn in september of 2021 and has been followed in the office. He reports having pain in the RLE mostly in the toes and has open wounds with some purulence and gangrene. The pain is worse at night when lying in bed, and improves with tylenol and dangling the foot off the bed. He is non-ambulatory at home.  He presents today from the office for preoperative planning and optimization for a RLE AKA on Monday 4/4 with Dr. Quinn.      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 975 milliGRAM(s) Oral every 8 hours  albuterol/ipratropium for Nebulization 3 milliLiter(s) Nebulizer every 6 hours  atorvastatin 40 milliGRAM(s) Oral at bedtime  buDESOnide    Inhalation Suspension 0.5 milliGRAM(s) Inhalation every 12 hours  chlorhexidine 2% Cloths 1 Application(s) Topical <User Schedule>  finasteride 5 milliGRAM(s) Oral daily  furosemide   Injectable 40 milliGRAM(s) IV Push every 24 hours  gabapentin 300 milliGRAM(s) Oral every 8 hours  guaiFENesin ER 1200 milliGRAM(s) Oral every 12 hours  heparin  Infusion 1500 Unit(s)/Hr (12 mL/Hr) IV Continuous <Continuous>  insulin glargine Injectable (LANTUS) 36 Unit(s) SubCutaneous at bedtime  insulin lispro (ADMELOG) corrective regimen sliding scale   SubCutaneous three times a day before meals  insulin lispro (ADMELOG) corrective regimen sliding scale   SubCutaneous at bedtime  insulin lispro Injectable (ADMELOG) 10 Unit(s) SubCutaneous before dinner  levothyroxine 25 MICROGram(s) Oral daily  metoprolol succinate ER 25 milliGRAM(s) Oral daily  montelukast 10 milliGRAM(s) Oral daily  multivitamin/minerals 1 Tablet(s) Oral daily  pantoprazole    Tablet 40 milliGRAM(s) Oral before breakfast  piperacillin/tazobactam IVPB.. 3.375 Gram(s) IV Intermittent every 8 hours  polyethylene glycol 3350 17 Gram(s) Oral daily  predniSONE   Tablet   Oral   senna 2 Tablet(s) Oral at bedtime  tamsulosin 0.8 milliGRAM(s) Oral at bedtime    MEDICATIONS  (PRN):  traMADol 25 milliGRAM(s) Oral every 4 hours PRN Moderate Pain (4 - 6)  traMADol 50 milliGRAM(s) Oral every 4 hours PRN Severe Pain (7 - 10)      Allergies    No Known Allergies    Intolerances        VITALS:    Vital Signs Last 24 Hrs  T(C): 36.7 (18 Apr 2022 13:55), Max: 36.7 (17 Apr 2022 22:20)  T(F): 98.1 (18 Apr 2022 13:55), Max: 98.1 (18 Apr 2022 13:55)  HR: 71 (18 Apr 2022 13:55) (62 - 71)  BP: 127/69 (18 Apr 2022 13:55) (114/52 - 158/67)  BP(mean): --  RR: 16 (18 Apr 2022 13:55) (16 - 19)  SpO2: 93% (18 Apr 2022 13:55) (92% - 95%)    LABS:                          8.5    22.51 )-----------( 297      ( 18 Apr 2022 08:07 )             27.8       04-18    148<H>  |  110<H>  |  78<H>  ----------------------------<  71  4.2   |  22  |  1.67<H>    Ca    8.7      18 Apr 2022 08:07  Phos  4.6     04-18  Mg     2.4     04-18        CAPILLARY BLOOD GLUCOSE      POCT Blood Glucose.: 107 mg/dL (18 Apr 2022 12:01)  POCT Blood Glucose.: 85 mg/dL (18 Apr 2022 07:53)  POCT Blood Glucose.: 141 mg/dL (17 Apr 2022 22:27)  POCT Blood Glucose.: 187 mg/dL (17 Apr 2022 17:12)      PT/INR - ( 18 Apr 2022 08:07 )   PT: 12.5 sec;   INR: 1.09 ratio         PTT - ( 18 Apr 2022 08:07 )  PTT:67.3 sec    LOWER EXTREMITY PHYSICAL EXAM:  s/p RLE BKA amputation  left foot exam:  Vasular: DP/PT 0/4, CFT <3 seconds, Temperature gradient wnl.   Neuro: Epicritic sensation decreased to the level of foot  Musculoskeletal/Ortho: hammertoes 2-5   Skin: left foot heel partial thickness discoloration secondary to ischemia with no signs of infection    Patient is a 87y old  Male who presents with a chief complaint of Preoperative Planning for Right above knee amputation (18 Apr 2022 15:42)      HPI:  CC: Sent in from office for right above knee amputation    HPI: Mr. Heart is an 86 year old gentleman with a PMH DM, HTN, HLD who has been followed for the past 6 months for foot wounds and leg pain. He underwent and angiogram with Dr. Quinn in september of 2021 and has been followed in the office. He reports having pain in the RLE mostly in the toes and has open wounds with some purulence and gangrene. The pain is worse at night when lying in bed, and improves with tylenol and dangling the foot off the bed. He is non-ambulatory at home.  He presents today from the office for preoperative planning and optimization for a RLE AKA on Monday 4/4 with Dr. Quinn.      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 975 milliGRAM(s) Oral every 8 hours  albuterol/ipratropium for Nebulization 3 milliLiter(s) Nebulizer every 6 hours  atorvastatin 40 milliGRAM(s) Oral at bedtime  buDESOnide    Inhalation Suspension 0.5 milliGRAM(s) Inhalation every 12 hours  chlorhexidine 2% Cloths 1 Application(s) Topical <User Schedule>  finasteride 5 milliGRAM(s) Oral daily  furosemide   Injectable 40 milliGRAM(s) IV Push every 24 hours  gabapentin 300 milliGRAM(s) Oral every 8 hours  guaiFENesin ER 1200 milliGRAM(s) Oral every 12 hours  heparin  Infusion 1500 Unit(s)/Hr (12 mL/Hr) IV Continuous <Continuous>  insulin glargine Injectable (LANTUS) 36 Unit(s) SubCutaneous at bedtime  insulin lispro (ADMELOG) corrective regimen sliding scale   SubCutaneous three times a day before meals  insulin lispro (ADMELOG) corrective regimen sliding scale   SubCutaneous at bedtime  insulin lispro Injectable (ADMELOG) 10 Unit(s) SubCutaneous before dinner  levothyroxine 25 MICROGram(s) Oral daily  metoprolol succinate ER 25 milliGRAM(s) Oral daily  montelukast 10 milliGRAM(s) Oral daily  multivitamin/minerals 1 Tablet(s) Oral daily  pantoprazole    Tablet 40 milliGRAM(s) Oral before breakfast  piperacillin/tazobactam IVPB.. 3.375 Gram(s) IV Intermittent every 8 hours  polyethylene glycol 3350 17 Gram(s) Oral daily  predniSONE   Tablet   Oral   senna 2 Tablet(s) Oral at bedtime  tamsulosin 0.8 milliGRAM(s) Oral at bedtime    MEDICATIONS  (PRN):  traMADol 25 milliGRAM(s) Oral every 4 hours PRN Moderate Pain (4 - 6)  traMADol 50 milliGRAM(s) Oral every 4 hours PRN Severe Pain (7 - 10)      Allergies    No Known Allergies    Intolerances        VITALS:    Vital Signs Last 24 Hrs  T(C): 36.7 (18 Apr 2022 13:55), Max: 36.7 (17 Apr 2022 22:20)  T(F): 98.1 (18 Apr 2022 13:55), Max: 98.1 (18 Apr 2022 13:55)  HR: 71 (18 Apr 2022 13:55) (62 - 71)  BP: 127/69 (18 Apr 2022 13:55) (114/52 - 158/67)  BP(mean): --  RR: 16 (18 Apr 2022 13:55) (16 - 19)  SpO2: 93% (18 Apr 2022 13:55) (92% - 95%)    LABS:                          8.5    22.51 )-----------( 297      ( 18 Apr 2022 08:07 )             27.8       04-18    148<H>  |  110<H>  |  78<H>  ----------------------------<  71  4.2   |  22  |  1.67<H>    Ca    8.7      18 Apr 2022 08:07  Phos  4.6     04-18  Mg     2.4     04-18        CAPILLARY BLOOD GLUCOSE      POCT Blood Glucose.: 107 mg/dL (18 Apr 2022 12:01)  POCT Blood Glucose.: 85 mg/dL (18 Apr 2022 07:53)  POCT Blood Glucose.: 141 mg/dL (17 Apr 2022 22:27)  POCT Blood Glucose.: 187 mg/dL (17 Apr 2022 17:12)      PT/INR - ( 18 Apr 2022 08:07 )   PT: 12.5 sec;   INR: 1.09 ratio         PTT - ( 18 Apr 2022 08:07 )  PTT:67.3 sec    LOWER EXTREMITY PHYSICAL EXAM:  s/p RLE BKA amputation  left foot exam:  Vasular: DP 1/4, PT 0/4 CFT <3 seconds, Temperature gradient wnl.   Neuro: Epicritic sensation decreased to the level of foot  Musculoskeletal/Ortho: hammertoes 2-5   Skin: left foot heel partial thickness discoloration secondary to ischemia with no signs of infection

## 2022-04-18 NOTE — PROGRESS NOTE ADULT - ASSESSMENT
ASSESSMENT:    86 year old gentleman, former smoker, followed by Dr. Alicja Rob of our practice for asthma/COPD overlap  syndrome and obstructive sleep apnea being treated conservatively. He has no history of TOM colonization/infection as listed in the "past medical history". He has been stable from a pulmonary perspective and maintained on budesonide and duoneb once daily and if needed. He also has a history of HTN, HLD, DM, CKD, CVA, CHF (mild systolic dysfunction) and atrial fibrillation with sick sinus syndrome s/p pacemaker implantation. He has been followed for many months for chronic foot wounds and leg pain now with some purulence and gangrene. The pain is exacerbated when laying in bed and improves with tylenol and when hanging the legs off of the bed. He is no longer able to ambulate. 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 The patient has developed marked shortness of breath with severe hypoxemia currently requiring a nasal canula @ 5lpm to maintain saturation @ 90%. He has a cough with copious mucous in his chest which he is unable to expectorate. He has chest congestion and wheeze. No fevers, chills or sweats. No chest pain/pressure or palpitations. Called by the patient's family and asked to be involved with his pulmonary care.    acute hypoxic respiratory failure -> resolved    1) severe COPD exacerbation -> slowly improving  2) 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 limiting diaphragmatic excursion and respiratory muscle weakness decreasing chest wall expansion -> bibasilar atelectasis  3) no evidence of pulmonary edema or pneumonia on the most recent CXR  4) dysphagia with dyspnea, cough and chest gurgling after eating    leukocytosis more likely related to systemic steroids than infection    steroid induced hyperglycemia    CKD/KARLOS due to diuresis in the setting of euvolemia -> improved    4/14 - remains in the ICU; continues on an insulin infusion for steroid induced hyperglycemia; worsening of his mental status and chronic left sided weakness and left facial droop after analgesics prior to the VAC change yesterday; CT scan and EEG are unremarkable; started on zosyn for possible "sepsis"; now awake and alert sitting in the chair and back to his baseline mental status; no shortness of breath or hypoxemia on room air; occasional cough productive of scant sputum; minimal chest congestion and wheeze; no fevers, chills or sweats; no chest pain/pressure or palpitations; CXR now has bibasilar atelectasis - there is no evidence of pulmonary edema; on heparin gtt for PAD    4/15 - transferred to the surgical floor; mental status is back to baseline; left facial droop and left sided weakness are no worse than usual; continues on zosyn for possible "sepsis"; awake and alert sitting in the chair; no shortness of breath or hypoxemia on room air; occasional cough productive of scant sputum; minimal chest congestion and wheeze; no fevers, chills or sweats; no chest pain/pressure or palpitations; CXR is with a small left pleural effusion and bibasilar atelectasis - there is no evidence of pulmonary edema; on heparin gtt for PAD    PLAN/RECOMMENDATIONS:    stable oxygenation on room air  EEG -> mild to moderate nonspecific diffuse or multifocal cerebral dysfunction -  no epileptiform patterns or seizures recorded.  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, stable in appearance compared with prior dated 9/8/2019  started on zosyn for "sepsis" given increasing leukocytosis - blood cultures are negative - no evidence of a UTI on U/A - would consider observing off antibiotics - ID evaluation has been called  prednisone 50mg daily - taper by 10mg every 2 days - glucose control on steroids has improved - off an insulin gtt  albuterol/atrovent nebs q6h  pulmicort 0.5mg nebs q12h - give after duoneb - rinse mouth after use  mucinex 1200mg 2 times daily  singulair 10mg @ bedtime  acapella device/incentive spirometer  speech and swallow evaluation if not previously done  respiratory status continues to improve - his hypoxemia has resolved and bronchospasm has improved - at this point, there is no pulmonary contraindication to the proposed AKA  cardiac meds: lipitor/toprol XL/lasix  flomax/proscar  vascular surgery follow-up    heparin gtt    pain control    AKA scheduled for 4/22  GI prophylaxis - protonix  proscar/flomax  bowel regimen  out of bed and into the chair    Will follow with you. Plan of care discussed with the patient and his family at bedside and with Dr. Spaulding.    [place]abjit Wright MD, [facility] [place]  [phone]  Pulmonary Medicine

## 2022-04-18 NOTE — PROGRESS NOTE ADULT - SUBJECTIVE AND OBJECTIVE BOX
Patient is a 87y old  Male who presents with a chief complaint of Preoperative Planning for Right above knee amputation (18 Apr 2022 16:55)      SUBJECTIVE / OVERNIGHT EVENTS: no new developments    MEDICATIONS  (STANDING):  acetaminophen     Tablet .. 975 milliGRAM(s) Oral every 8 hours  albuterol/ipratropium for Nebulization 3 milliLiter(s) Nebulizer every 6 hours  atorvastatin 40 milliGRAM(s) Oral at bedtime  buDESOnide    Inhalation Suspension 0.5 milliGRAM(s) Inhalation every 12 hours  chlorhexidine 2% Cloths 1 Application(s) Topical <User Schedule>  finasteride 5 milliGRAM(s) Oral daily  furosemide   Injectable 40 milliGRAM(s) IV Push every 24 hours  gabapentin 300 milliGRAM(s) Oral every 8 hours  guaiFENesin ER 1200 milliGRAM(s) Oral every 12 hours  heparin  Infusion 1500 Unit(s)/Hr (12 mL/Hr) IV Continuous <Continuous>  insulin glargine Injectable (LANTUS) 36 Unit(s) SubCutaneous at bedtime  insulin lispro (ADMELOG) corrective regimen sliding scale   SubCutaneous three times a day before meals  insulin lispro (ADMELOG) corrective regimen sliding scale   SubCutaneous at bedtime  insulin lispro Injectable (ADMELOG) 10 Unit(s) SubCutaneous before dinner  levothyroxine 25 MICROGram(s) Oral daily  metoprolol succinate ER 25 milliGRAM(s) Oral daily  montelukast 10 milliGRAM(s) Oral daily  multivitamin/minerals 1 Tablet(s) Oral daily  pantoprazole    Tablet 40 milliGRAM(s) Oral before breakfast  piperacillin/tazobactam IVPB.. 3.375 Gram(s) IV Intermittent every 8 hours  polyethylene glycol 3350 17 Gram(s) Oral daily  predniSONE   Tablet   Oral   senna 2 Tablet(s) Oral at bedtime  tamsulosin 0.8 milliGRAM(s) Oral at bedtime    MEDICATIONS  (PRN):  traMADol 25 milliGRAM(s) Oral every 4 hours PRN Moderate Pain (4 - 6)  traMADol 50 milliGRAM(s) Oral every 4 hours PRN Severe Pain (7 - 10)      Vital Signs Last 24 Hrs  T(F): 97.5 (04-18-22 @ 17:09), Max: 98.1 (04-18-22 @ 13:55)  HR: 67 (04-18-22 @ 17:09) (62 - 71)  BP: 128/52 (04-18-22 @ 17:09) (114/52 - 158/67)  RR: 18 (04-18-22 @ 17:09) (16 - 19)  SpO2: 91% (04-18-22 @ 17:09) (91% - 94%)  Telemetry:   CAPILLARY BLOOD GLUCOSE      POCT Blood Glucose.: 142 mg/dL (18 Apr 2022 17:20)  POCT Blood Glucose.: 107 mg/dL (18 Apr 2022 12:01)  POCT Blood Glucose.: 85 mg/dL (18 Apr 2022 07:53)  POCT Blood Glucose.: 141 mg/dL (17 Apr 2022 22:27)    I&O's Summary    17 Apr 2022 07:01  -  18 Apr 2022 07:00  --------------------------------------------------------  IN: 1622 mL / OUT: 3450 mL / NET: -1828 mL    18 Apr 2022 07:01  -  18 Apr 2022 21:23  --------------------------------------------------------  IN: 864 mL / OUT: 825 mL / NET: 39 mL        PHYSICAL EXAM:  GENERAL: NAD, well-developed  HEAD:  Atraumatic, Normocephalic  EYES: EOMI, PERRLA, conjunctiva and sclera clear  NECK: Supple, No JVD  CHEST/LUNG: Clear to auscultation bilaterally; No wheeze  HEART: Regular rate and rhythm; No murmurs, rubs, or gallops  ABDOMEN: Soft, Nontender, Nondistended; Bowel sounds present  EXTREMITIES:  2+ Peripheral Pulses, No clubbing, cyanosis, or edema  PSYCH: AAOx3  NEUROLOGY: non-focal  SKIN: No rashes or lesions    LABS:                        8.5    22.51 )-----------( 297      ( 18 Apr 2022 08:07 )             27.8     04-18    148<H>  |  110<H>  |  78<H>  ----------------------------<  71  4.2   |  22  |  1.67<H>    Ca    8.7      18 Apr 2022 08:07  Phos  4.6     04-18  Mg     2.4     04-18      PT/INR - ( 18 Apr 2022 08:07 )   PT: 12.5 sec;   INR: 1.09 ratio         PTT - ( 18 Apr 2022 08:07 )  PTT:67.3 sec          RADIOLOGY & ADDITIONAL TESTS:    Imaging Personally Reviewed:    Consultant(s) Notes Reviewed:      Care Discussed with Consultants/Other Providers:

## 2022-04-18 NOTE — PROGRESS NOTE ADULT - ASSESSMENT
86M PMH HTN, HLD, DM2 and nonhealing wounds of RLE who is non-ambulatory at home now s/p right guillotine BELOW knee amputation. Postoperative course c/b severe COPD exacerbation requiring excalation of O2 supplementation with HFNC. SICU consulted for close respiratory monitoring in the setting of COPD exacerbation. Off insulin gtt, on room air.     Plan:  - Continue wound vac changes M/W/F  - Continue hep gtt, goal aPTT 59-99  - Continue IV abx: Zosyn  - Pain control  - Consistent carb diet  - Insulin sliding scale, basal + pre-meal insulin  - Bowel regimen  - Continue steroid taper, respiratory treatment  - Replete electrolytes prn  - Appreciate cards, pulm, neprho, and medicine recs  - Dispo: Will discuss transitioning to oral AC and abx in preparation for discharge planning soon; no plan for AKA on this admission, will plan for outpatient formalization      Vascular Surgery  [id number] 86M PMH HTN, HLD, DM2 and nonhealing wounds of RLE who is non-ambulatory at home now s/p right guillotine BELOW knee amputation. Postoperative course c/b severe COPD exacerbation requiring excalation of O2 supplementation with HFNC. SICU consulted for close respiratory monitoring in the setting of COPD exacerbation. Off insulin gtt, on room air.     Plan:  - OR for R AKA completion on Friday.   - Needs cardiology and medicine clearance.   - Continue wound vac changes M/W/F  - Continue hep gtt, goal aPTT 59-99  - Continue IV abx: Zosyn  - ID consult for AB regimen.   - Pain control  - Consistent carb diet  - Insulin sliding scale, basal + pre-meal insulin  - Bowel regimen  - Continue steroid taper, respiratory treatment  - Replete electrolytes prn  - Appreciate cards, pulm, neprho, and medicine recs        Vascular Surgery  [id number]

## 2022-04-18 NOTE — CONSULT NOTE ADULT - ASSESSMENT
86 year old with PVD, COPD, admitted with progressive infected wounds for right BKA. Also with history of TOM (not being treated at present AF, ERCP for stones. Pt had guillotine BKA. Post op had exacerbation of COPD. Received Prednisone in the icu. Has had increasing wbc last several days  (22K)    wound had vac over it but was described as having some erythema .  Also is coughing has rhonchi and lower lobe infiltrates.    WBC could be due to steroids, pna or open wd       continue zosyn for now  WBC may come down off steroids  would get follow up chest x-ray

## 2022-04-18 NOTE — PROGRESS NOTE ADULT - SUBJECTIVE AND OBJECTIVE BOX
Vascular Surgery Progress Note    Subjective/24hour Events: Patient seen and examined on AM rounds. No acute events overnight. Pain controlled.      Vital Signs:  Vital Signs Last 24 Hrs  T(C): 36.4 (18 Apr 2022 00:15), Max: 36.7 (17 Apr 2022 22:20)  T(F): 97.5 (18 Apr 2022 00:15), Max: 98 (17 Apr 2022 22:20)  HR: 70 (18 Apr 2022 00:15) (66 - 76)  BP: 158/67 (18 Apr 2022 00:15) (128/64 - 158/67)  BP(mean): --  RR: 18 (18 Apr 2022 00:15) (16 - 18)  SpO2: 93% (18 Apr 2022 00:15) (91% - 95%)    CAPILLARY BLOOD GLUCOSE      POCT Blood Glucose.: 141 mg/dL (17 Apr 2022 22:27)  POCT Blood Glucose.: 187 mg/dL (17 Apr 2022 17:12)  POCT Blood Glucose.: 146 mg/dL (17 Apr 2022 12:16)  POCT Blood Glucose.: 133 mg/dL (17 Apr 2022 07:55)      I&O's Detail    16 Apr 2022 07:01  -  17 Apr 2022 07:00  --------------------------------------------------------  IN:    Heparin: 360 mL    Oral Fluid: 1080 mL  Total IN: 1440 mL    OUT:    Indwelling Catheter - Urethral (mL): 3200 mL    Stool (mL): 1 mL  Total OUT: 3201 mL    Total NET: -1761 mL      17 Apr 2022 07:01  -  18 Apr 2022 00:52  --------------------------------------------------------  IN:    Heparin: 138 mL    Oral Fluid: 900 mL  Total IN: 1038 mL    OUT:    Indwelling Catheter - Urethral (mL): 2850 mL  Total OUT: 2850 mL    Total NET: -1812 mL          Physical Exam:  Gen: NAD, resting comfortably in bed  CV: Regular rate  Resp: Nonlabored breathing on room air  Ext: MARIAMA JAMES stump with wound vac in place holding appropriate suction, knee immobilizer in place, erythema improved around stump site      Labs:    04-17    149<H>  |  112<H>  |  86<H>  ----------------------------<  115<H>  3.9   |  20<L>  |  1.75<H>    Ca    8.7      17 Apr 2022 07:37  Phos  4.9     04-17  Mg     2.5     04-17                              8.4    19.99 )-----------( 302      ( 17 Apr 2022 07:30 )             28.5     PT/INR - ( 16 Apr 2022 07:11 )   PT: 12.1 sec;   INR: 1.05 ratio         PTT - ( 17 Apr 2022 18:13 )  PTT:85.8 sec

## 2022-04-18 NOTE — PROGRESS NOTE ADULT - ASSESSMENT
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 Pt on prednisone taper, being reduced to 30mg daily starting 4/19. BG values tightly controlled today. Tolerating POs. BG goal (100-180mg/dl).

## 2022-04-18 NOTE — CONSULT NOTE ADULT - SUBJECTIVE AND OBJECTIVE BOX
Patient is a 87y old  Male who presents with a chief complaint of Preoperative Planning for Right above knee amputation (18 Apr 2022 00:52)    HPI:  CC: Sent in from office for right above knee amputation    HPI: Mr. Heart is an 86 year old gentleman with a PMH DM, HTN, HLD who has been followed for the past 6 months for foot wounds and leg pain. He underwent and angiogram with Dr. Quinn in september of 2021 and has been followed in the office. He reports having pain in the RLE mostly in the toes and has open wounds with some purulence and gangrene. The pain is worse at night when lying in bed, and improves with tylenol and dangling the foot off the bed. He is non-ambulatory at home.  He presents today from the office for preoperative planning and optimization for a RLE AKA on Monday 4/4 with Dr. Quinn.      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 3.375 Gram(s) IV Intermittent every 8 hours        Vital Signs Last 24 Hrs  T(C): 36.4 (18 Apr 2022 10:11), Max: 36.7 (17 Apr 2022 22:20)  T(F): 97.5 (18 Apr 2022 10:11), Max: 98 (17 Apr 2022 22:20)  HR: 66 (18 Apr 2022 10:11) (62 - 74)  BP: 114/52 (18 Apr 2022 10:11) (114/52 - 158/67)  BP(mean): --  RR: 19 (18 Apr 2022 10:11) (16 - 19)  SpO2: 92% (18 Apr 2022 10:11) (92% - 95%)              No cachexia     Eyes:PERRL EOMI.NO discharge or conjunctival injection    ENMT:No sinus tenderness.No thrush.No pharyngeal exudate or erythema.Fair dental hygiene    Neck:Supple,No LN,no JVD      Respiratory:Good rhonchi    Cardiovascular:S1 S2 wnl, No murmurs,rub or gallops    Gastrointestinal:Soft BS(+) no tenderness no masses ,No rebound or guarding    Genitourinary:No CVA tendereness                                         8.5    22.51 )-----------( 297      ( 18 Apr 2022 08:07 )             27.8         04-18    148<H>  |  110<H>  |  78<H>  ----------------------------<  71  4.2   |  22  |  1.67<H>    Ca    8.7      18 Apr 2022 08:07  Phos  4.6     04-18  Mg     2.4     04-18        RECENT CULTURES:  04-14 @ 11:16  .Blood Blood-Peripheral  --  --  --    No growth to date.  --      MICROBIOLOGY:  Culture Results:   No growth to date. (04-14 @ 11:16)  Culture Results:   No growth to date. (04-14 @ 11:16)          Radiology:      Assessment:        Recommendations and Plan:    Pager [phone]  After 5 pm/weekends or if no response :[phone]

## 2022-04-18 NOTE — PROGRESS NOTE ADULT - ASSESSMENT
85 yo male ex  smoker, h/o HTN, HLD, DM, CKD, CVA, CHF (mild systolic dysfunction) and atrial fibrillation with sick sinus syndrome s/p pacemaker implantation.  h/o COPD/ZACKARY, TOM colonization/infection, admitted on 4/1 with RLE wet gangrene, s/p R guillotine BKA, and is awaiting a staged right lower extremity AKA. on heparin gtt for PAD  post op course complicated by acute hypoxic respiratory failure 2/2 COPD exacerbation. now resolved  ptn seen on 9 Jaime  Ptn is now stable on po prednisone taper, pulm following  albuterol/atrovent nebs   pulmicort 0.5mg nebs q12h   mucinex 1200mg 2 times daily  singulair 10mg @ bedtime  acapella device/incentive spirometer  on RA 91-92% pulse Ox  steroid induced hyperglycemia, now off insulin drip, on Lantus and prandial Admelog  placed on Zosyn 2/2 possible sepsis after he was found with worsening mental status   wbc remains elevated, ID on board, recommend cont ZOsyn  its possible AMS on 4/13 was 2/2 analgesics prior to the VAC change on 4/13. since 4/14 MS back at baseline  CKD3/s/p KARLOS due to diuresis in the setting of euvolemia -> improved, renal following  HTN, systolic CHF, Afib stable, cardiology following  awaiting a staged right lower extremity AKA. medically cleared for surgery pending ID clearance

## 2022-04-18 NOTE — PROGRESS NOTE ADULT - SUBJECTIVE AND OBJECTIVE BOX
Diabetes Follow up note:    Chief complaint: T2DM w/steroid induced hyperglycemia    Interval Hx: Fasting glucose in 80s this AM. Pt seen at bedside this afternoon. Reports able to tolerate POs.     Review of Systems:  General: + R leg pain  GI: Tolerating POs. Denies N/V/D/Abd pain  Resp: + SOB  ENDO: No S&Sx of hypoglycemia    MEDS:  atorvastatin 40 milliGRAM(s) Oral at bedtime  finasteride 5 milliGRAM(s) Oral daily  insulin glargine Injectable (LANTUS) 40 Unit(s) SubCutaneous at bedtime  insulin lispro (ADMELOG) corrective regimen sliding scale   SubCutaneous three times a day before meals  insulin lispro (ADMELOG) corrective regimen sliding scale   SubCutaneous at bedtime  insulin lispro Injectable (ADMELOG) 15 Unit(s) SubCutaneous before breakfast  insulin lispro Injectable (ADMELOG) 12 Unit(s) SubCutaneous before lunch  insulin lispro Injectable (ADMELOG) 12 Unit(s) SubCutaneous before dinner  levothyroxine 25 MICROGram(s) Oral daily  predniSONE   Tablet   Oral     piperacillin/tazobactam IVPB.. 3.375 Gram(s) IV Intermittent every 8 hours    Allergies    No Known Allergies          PE:  General: Male lying in bed. NAD.   Vital Signs Last 24 Hrs  T(C): 36.7 (18 Apr 2022 13:55), Max: 36.7 (17 Apr 2022 22:20)  T(F): 98.1 (18 Apr 2022 13:55), Max: 98.1 (18 Apr 2022 13:55)  HR: 71 (18 Apr 2022 13:55) (62 - 71)  BP: 127/69 (18 Apr 2022 13:55) (114/52 - 158/67)  BP(mean): --  RR: 16 (18 Apr 2022 13:55) (16 - 19)  SpO2: 93% (18 Apr 2022 13:55) (92% - 95%)  Resp: + accessory muscle use.   Abd: Soft, NT,ND, Central adiposity.   Extremities: Warm. R Ankle disarticulation w/wound vac c/d/i  Neuro: A&O X3    LABS:  POCT Blood Glucose.: 107 mg/dL (04-18-22 @ 12:01)  POCT Blood Glucose.: 85 mg/dL (04-18-22 @ 07:53)  POCT Blood Glucose.: 141 mg/dL (04-17-22 @ 22:27)  POCT Blood Glucose.: 187 mg/dL (04-17-22 @ 17:12)  POCT Blood Glucose.: 146 mg/dL (04-17-22 @ 12:16)  POCT Blood Glucose.: 133 mg/dL (04-17-22 @ 07:55)  POCT Blood Glucose.: 139 mg/dL (04-16-22 @ 21:41)  POCT Blood Glucose.: 117 mg/dL (04-16-22 @ 17:10)  POCT Blood Glucose.: 206 mg/dL (04-16-22 @ 13:15)  POCT Blood Glucose.: 177 mg/dL (04-16-22 @ 08:56)  POCT Blood Glucose.: 148 mg/dL (04-15-22 @ 21:12)  POCT Blood Glucose.: 167 mg/dL (04-15-22 @ 17:03)                            8.5    22.51 )-----------( 297      ( 18 Apr 2022 08:07 )             27.8       04-18    148<H>  |  110<H>  |  78<H>  ----------------------------<  71  4.2   |  22  |  1.67<H>    Ca    8.7      18 Apr 2022 08:07  Phos  4.6     04-18  Mg     2.4     04-18      A1C with Estimated Average Glucose Result: 6.8 % (04-02-22 @ 12:21)  A1C with Estimated Average Glucose Result: 7.4 % (09-03-21 @ 19:03)          Contact number: daily [phone] or [phone]

## 2022-04-18 NOTE — PROGRESS NOTE ADULT - ASSESSMENT
86M with PMH of COPD (not on home o2), prior smoking history (40 pack years), HTN, HLD, Afib, CHF, T2DM, CVA, BPH, and PAD admitted for elective RLE AKA. Renal consulted for CKD Mx.    KARLOS on CKD 3,   baseline Cr ~1.7  serum k stable  bicarb stable  rise likely 2/2 hemodynamic changes--> ATN  c/w hudson-- good urine output   cont monitor Serum Creatinine   cont  lasix 40mg iv qd for now given lower ext edema  mild hypernatremia- tolerating PO intake, will monitor with AM labs   off losartan   monitor BMP daily and u/o   dose all meds for eGFR  avoid NSAIDs/Nephrotoxics.      Rt LE nonhealing wound:  s/p amputation  follow up with vascular      Dr Knight  [phone]

## 2022-04-18 NOTE — PROGRESS NOTE ADULT - SUBJECTIVE AND OBJECTIVE BOX
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    INTERVAL HISTORY: short of breath, coughing and gurgling after eating chicken salad for lunch -> resolved over several minutes; mental status is back to baseline; left facial droop and left sided weakness are no worse than usual; continues on zosyn for possible "sepsis"; awake and alert sitting in the chair; no shortness of breath or hypoxemia on room air; occasional cough productive of scant sputum; minimal chest congestion and wheeze; no fevers, chills or sweats; no chest pain/pressure or palpitations; CXR is with a small left pleural effusion and bibasilar atelectasis - there is no evidence of pulmonary edema; on heparin gtt for PAD    REVIEW OF SYSTEMS:  Constitutional: As per interval history  HEENT: Within normal limits  CV: As per interval history  Resp: As per interval history  GI: dysphagia  : KARLOS -> resolved  Musculoskeletal: s/p right BKA  Skin: Within normal limits  Neurological: Within normal limits  Psychiatric: Within normal limits  Endocrine: hyperglycemia  Hematologic/Lymphatic: Within normal limits  Allergic/Immunologic: Within normal limits    MEDICATIONS:     Pulmonary "  albuterol/ipratropium for Nebulization 3 milliLiter(s) Nebulizer every 6 hours  buDESOnide    Inhalation Suspension 0.5 milliGRAM(s) Inhalation every 12 hours  guaiFENesin ER 1200 milliGRAM(s) Oral every 12 hours  montelukast 10 milliGRAM(s) Oral daily    Anti-microbials:  piperacillin/tazobactam IVPB.. 3.375 Gram(s) IV Intermittent every 8 hours    Cardiovascular:  furosemide   Injectable 40 milliGRAM(s) IV Push every 24 hours  metoprolol succinate ER 25 milliGRAM(s) Oral daily  tamsulosin 0.8 milliGRAM(s) Oral at bedtime    Other:  acetaminophen     Tablet .. 975 milliGRAM(s) Oral every 8 hours  atorvastatin 40 milliGRAM(s) Oral at bedtime  chlorhexidine 2% Cloths 1 Application(s) Topical <User Schedule>  finasteride 5 milliGRAM(s) Oral daily  gabapentin 300 milliGRAM(s) Oral every 8 hours  heparin  Infusion 1500 Unit(s)/Hr IV Continuous <Continuous>  insulin glargine Injectable (LANTUS) 40 Unit(s) SubCutaneous at bedtime  insulin lispro (ADMELOG) corrective regimen sliding scale   SubCutaneous three times a day before meals  insulin lispro (ADMELOG) corrective regimen sliding scale   SubCutaneous at bedtime  insulin lispro Injectable (ADMELOG) 15 Unit(s) SubCutaneous before breakfast  insulin lispro Injectable (ADMELOG) 12 Unit(s) SubCutaneous before lunch  insulin lispro Injectable (ADMELOG) 12 Unit(s) SubCutaneous before dinner  levothyroxine 25 MICROGram(s) Oral daily  multivitamin/minerals 1 Tablet(s) Oral daily  pantoprazole    Tablet 40 milliGRAM(s) Oral before breakfast  polyethylene glycol 3350 17 Gram(s) Oral daily  predniSONE   Tablet   Oral   senna 2 Tablet(s) Oral at bedtime    MEDICATIONS  (PRN):  traMADol 25 milliGRAM(s) Oral every 4 hours PRN Moderate Pain (4 - 6)  traMADol 50 milliGRAM(s) Oral every 4 hours PRN Severe Pain (7 - 10)    OBJECTIVE:    POCT Blood Glucose.: 107 mg/dL (2022 12:01)  POCT Blood Glucose.: 85 mg/dL (2022 07:53)  POCT Blood Glucose.: 141 mg/dL (2022 22:27)  POCT Blood Glucose.: 187 mg/dL (2022 17:12)    PHYSICAL EXAM:       ICU Vital Signs Last 24 Hrs  T(C): 36.4 (2022 10:11), Max: 36.7 (2022 22:20)  T(F): 97.5 (2022 10:11), Max: 98 (2022 22:20)  HR: 66 (2022 10:11) (62 - 71)  BP: 114/52 (2022 10:11) (114/52 - 158/67)  BP(mean): --  ABP: --  ABP(mean): --  RR: 19 (2022 10:11) (16 - 19)  SpO2: 92% (2022 10:11) (92% - 95%) on room air     General: Awake. Alert. Cooperative. Coughing and gurgling after eating chicken salad. Appears stated age. Obese.   HEENT: Atraumatic. Normocephalic. Anicteric. Normal oral mucosa. PERRL. EOMI.  Neck: Supple. Trachea midline. Thyroid without enlargement/tenderness/nodules. No carotid bruit. No JVD.	  Cardiovascular: Regular rate and rhythm. Distant S1 S2. No murmurs, rubs or gallops.  Respiratory: Respirations unlabored. Bilateral rhonchi and wheeze. No curvature.  Abdomen: Soft. Non-tender. Non-distended. No organomegaly. No masses. Normal bowel sounds.  Extremities: Warm to touch. No clubbing or cyanosis. No pedal edema. Right BKA with VAC dressing  Pulses: Decreased peripheral pulses LLE  Skin: Venous stasis changes left lower extremity  Lymph Nodes: Cervical, supraclavicular and axillary nodes normal  Neurological: Left sided weakness left > arm. Left facial droop. A and O x 3  Psychiatry: Appropriate mood and affect.    LABS:                          8.5    22.51 )-----------( 297      ( 2022 08:07 )             27.8     CBC    WBC  22.51 <==, 19.99 <==, 17.75 <==, 20.97 <==, 21.40 <==, 19.73 <==, 17.15 <==    Hemoglobin  8.5 <<==, 8.4 <<==, 8.4 <<==, 7.9 <<==, 7.8 <<==, 8.3 <<==, 8.4 <<==    Hematocrit  27.8 <==, 28.5 <==, 29.0 <==, 25.4 <==, 25.6 <==, 27.6 <==, 27.8 <==    Platelets  297 <==, 302 <==, 287 <==, 320 <==, 281 <==, 264 <==, 258 <==      148<H>  |  110<H>  |  78<H>  ----------------------------<  71    04-18  4.2   |  22  |  1.67<H>      LYTES    sodium  148 <==, 149 <==, 149 <==, 145 <==, 144 <==, 140 <==, 141 <==    potassium   4.2 <==, 3.9 <==, 4.1 <==, 5.0 <==, 4.6 <==, 6.8 <==, 5.2 <==    chloride  110 <==, 112 <==, 112 <==, 112 <==, 109 <==, 106 <==, 106 <==    carbon dioxide  22 <==, 20 <==, 20 <==, 19 <==, 21 <==, 20 <==, 21 <==    =============================================================================================  RENAL FUNCTION:    Creatinine:   1.67  <<==, 1.75  <<==, 1.62  <<==, 1.87  <<==, 1.82  <<==, 1.59  <<==, 1.75  <<==    BUN:   78 <==, 86 <==, 91 <==, 96 <==, 99 <==, 99 <==, 99 <==    ============================================================================================    calcium   8.7 <==, 8.7 <==, 8.8 <==, 9.1 <==, 9.0 <==, 8.9 <==, 9.0 <==    phos   4.6 <==, 4.9 <==, 5.0 <==, 4.4 <==, 4.2 <==, 4.6 <==, 3.6 <==    mag   2.4 <==, 2.5 <==, 2.6 <==, 2.6 <==, 2.5 <==, 2.6 <==, 2.4 <==    ============================================================================================  PT/INR - ( 2022 08:07 )   PT: 12.5 sec;   INR: 1.09 ratio       PTT - ( 2022 08:07 )  PTT: 67.3 sec    ABG - ( 2022 18:43 )  pH: 7.43  /  pCO2: 35    /  pO2: 80    / HCO3: 23    / Base Excess: -0.8  /  SaO2: 99.0      Venous Blood Gas:   @ 22:10  7.40/41/52/25/84.4  VBG Lactate: 1.8    Venous Blood Gas:   @ 22:23  7.40/39/45/24/78.1  VBG Lactate: 2.4    Venous Blood Gas:   @ 22:23  7.40/39/45/24/78.1  VBG Lactate: 2.4    Venous Blood Gas:  04-10 @ 23:19  7.39/42/44/25/72.2  VBG Lactate: 2.5    < from: TTE with Doppler (w/Cont) (22 @ 15:07) >    Patient name: Martir Heart  YOB: 1935   Age: 86 (M)   MR#: [id number]  Study Date: 4/3/2022  Location: [address] [place], [place] [id number]: Angie Olivarez RDCS  Study quality: Technically difficult  Referring Physician: Anmol Quinn MD  BloodPressure: 115/70 mmHg  Height: 173 cm  Weight: 92 kg  BSA: 2.1 m2  ------------------------------------------------------------------------  PROCEDURE: Transthoracic echocardiogram with 2-D, M-Mode  and complete spectral and color flow Doppler. Verbal  consent was obtained for injection of  Ultrasonic Enhancing  Agent following a discussion of risks and benefits.  Following intravenous injection of Ultrasonic Enhancing  Agent, harmonic imaging was performed.  INDICATION: Cardiomyopathy, unspecified (I42.9)  ------------------------------------------------------------------------  Dimensions:    Normal Values:  LA:     3.1    2.0 - 4.0 cm  Ao:     3.5    2.0 - 3.8 cm  SEPTUM: 1.1    0.6 - 1.2 cm  PWT:    1.3    0.6 - 1.1 cm  LVIDd:  4.3    3.0- 5.6 cm  LVIDs:  3.2    1.8 - 4.0 cm  Derived variables:  LVMI: 90 g/m2  RWT: 0.60  Fractional short: 26 %  EF (Visual Estimate): NWV %  Doppler Peak Velocity (m/sec): MV=1.6 AoV=1.4  ------------------------------------------------------------------------  Observations:  Mitral Valve: Mitral valve not well visualized. Mitral  annular calcification. Peak mitral valve gradient equals 10  mm Hg, mean transmitral valve gradient equals 4 mm Hg,  consistent with mild mitral stenosis.  Aortic Valve/Aorta: Aortic valve not well visualized;  appears calcified. Peak transaortic valve gradient equals 8  mm Hg, mean transaortic valve gradient equals 3 mm Hg,  estimated aortic valve area equals 2 sqcm (by continuity  equation), aortic valve velocity time integral equals 22  cm, consistent with mild aortic stenosis. Peak left  ventricular outflow tract gradient equals 3 mm Hg, mean  gradient is equal to 1 mm Hg, LVOT velocity time integral  equals 12 cm.  Aortic Root: 3.5 cm.  Left Atrium: Normal left atrium.  Left Ventricle: Endocardial visualization enhanced with  intravenous injection of Ultrasonic Enhancing Agent  (Definity). Mild left ventricular systolic dysfunction. The  inferior wall, and the inferoseptum are hypokinetic.  Normal left ventricular internal dimensions and wall  thicknesses. Unable to evaluate diastology.  Right Heart: A device wire is noted in the right heart. The  right ventricle is not well visualized; grossly normal  right ventricular systolic function. Tricuspid valve not  well visualized. Pulmonic valve not well visualized,  probably normal.  Pericardium/Pleura: Normal pericardium with trace  pericardial effusion.  Hemodynamic: Estimated right atrial pressure is 8 mm Hg.  ------------------------------------------------------------------------  Conclusions:  1. Aortic valve not well visualized; appears calcified.  Peak transaortic valve gradient equals 8 mm Hg, mean  transaortic valve gradient equals 3 mm Hg, estimated aortic  valve area equals 2 sqcm (by continuity equation), aortic  valve velocity time integral equals 22 cm, consistent with  mild aortic stenosis.  2. Endocardial visualization enhanced with intravenous  injection of Ultrasonic Enhancing Agent (Definity). Mild  left ventricular systolic dysfunction. The inferior wall,  and the inferoseptum are hypokinetic.  3. The right ventricle is not well visualized; grossly  normal right ventricular systolic function.  ------------------------------------------------------------------------  Confirmed on  4/3/2022 - 17:12:14 by BRITTANY Giraldo  ------------------------------------------------------------------------  --------------------------------------------------------------------------------------------------------------  ---------------------------------------------------------------------------------------------------------------  MICROBIOLOGY:     COVID-19 PCR . (22 @ 18:23)   COVID-19 PCR: NotDetec:     Urinalysis Basic - ( 2022 06:05 )    Color: Light Yellow / Appearance: Clear / S.021 / pH: x  Gluc: x / Ketone: Negative  / Bili: Negative / Urobili: Negative   Blood: x / Protein: Trace / Nitrite: Negative   Leuk Esterase: Moderate / RBC: 185 /hpf / WBC 12 /HPF   Sq Epi: x / Non Sq Epi: 2 /hpf / Bacteria: Negative    Culture - Blood (22 @ 11:16)   Specimen Source: .Blood Blood-Peripheral   Culture Results:   No growth to date.     Culture - Blood (22 @ 11:16)   Specimen Source: .Blood Blood-Peripheral   Culture Results:   No growth to date.     RADIOLOGY:  [x] Chest radiographs reviewed and interpreted by me    EXAM:  XR CHEST PORTABLE ROUTINE 1V                          PROCEDURE DATE:  2022      FINDINGS:    Support devices: A pacemaker overlies left chest wall with its leads   intact.    Cardiac/mediastinum/hilum: Heart size cannot be accurately assessed on   this projection.    Lung parenchyma/Pleura: Bilateral lower lung hazy opacities, unchanged.   There is no pneumothorax.    Skeleton/soft tissues: No acute osseous abnormalities.    IMPRESSION:    Unchanged bilateral lower lung hazy opacities may represent atelectasis   versus infection.    EDITH HER MD; Resident Radiologist  This document has been electronically signed.  SATURNINO CHERRY MD; Attending Radiologist  This document has been electronically signed. 2022 10:54AM  ---------------------------------------------------------------------------------------------------------------   EXAM:  CT BRAIN                          PROCEDURE DATE:  2022      FINDINGS:    Prominence of ventricles and subarachnoid spaces, compatible with   atrophy. Periventricular small vessel white matter ischemic changes.   Encephalomalacia and gliosis is appreciated bilaterally compatible with   old regions of infarction, noted in a high left posterior frontal   location, right posterior parietal/occipital location, and right   cerebellar location. Old ischemic event is also appreciated along the   anterior limb of the internal capsule on the left and along the external   capsular region on the right.    There is prominence of the bifrontal extra-axial regions, suggestive of   bifrontal subdural hygromas, more prominent on the left than the   right-stable compared with prior. Prominent deposition of calcified   plaque within the bilateral carotid siphons, as on prior.    Deposition of calcium appreciated within the bilateral basal ganglia, as   on prior.    No acute intracranial hemorrhage. No intraparenchymal mass lesion, mass   effect, or midline shift.    Appearance of the bilateral optic lenses suggests the patient has   previously undergone prior cataract surgery.    Imaged paranasal sinuses, bilateral mastoid air cells, middle ear   cavities are clear.    IMPRESSION:  1. No acute intracranial hemorrhage.    2. 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 No evidence of an acute ischemic event.    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    DAWN BEHR-VENTURA MD; Attending Radiologist  This document has been electronically signed. 2022  8:25PM  ---------------------------------------------------------------------------------------------------------------   EXAM:  XR CHEST PORTABLE ROUTINE 1V                          PROCEDURE DATE:  2022      FINDINGS:    Support devices: A pacemaker overlies the left chest wall with its leads   intact.    Cardiac/mediastinum/hilum: Heart size cannot be accurately assessed on   this projection.    Lung parenchyma/Pleura: Right lower lung hazy opacities. Bibasilar   atelectasis. Trace left pleural effusion, unchanged. There is no   pneumothorax.    Skeleton/soft tissues: No acute osseous abnormalities.    IMPRESSION:    Right lower lung hazy opacities, which may represent atelectasis versus   infection.    Unchanged trace left pleural effusion.    EDITH HER MD; Resident Radiologist  This document has been electronically signed.  EVON MAN MD; Attending Radiologist  This document has been electronically signed. 2022  5:11PM  ---------------------------------------------------------------------------------------------------------------  EXAM:  XR CHEST PORTABLE ROUTINE 1V                          PROCEDURE DATE:  04/10/2022      FINDINGS:  Left pacemaker with leads in the right atrium and ventricle.    The heart size is not accurately assessed on this projection.  Bilateral lower lung field patchy opacities, left greater than right.  There is no pneumothorax. Trace left pleural effusion.    IMPRESSION:  Bilateral patchy opacities, left greater than right, differential   includes atelectasis or infection.    JAYDON MONTEMAYOR MD; Resident Radiologist  This document has been electronically signed.  SATURNINO CHERRY MD; Attending Radiologist  This document has been electronically signed. Apr 10 2022  9:14AM  --------------------------------------------------------------------------------------------  EXAM:  XR CHEST PORTABLE URGENT 1V                          PROCEDURE DATE:  2022      FINDINGS:  Left chest wall dual-lead pacemaker. Rotated exam limits assessment for   lead tip.    Small left pleural effusion with adjacent opacity. No pneumothorax.    Cardiac size cannot accurately be assessed in this projection.  Aortic   calcifications.      IMPRESSION: Small left pleural effusion with adjacent atelectasis   obscuring evaluation of the underlying lung.    DRE SANCHEZ MD; Resident Radiology  This document has been electronically signed.  WAGNER LAM MD; Attending Radiologist  This document has been electronically signed. 2022  5:54PM  ---------------------------------------------------------------------------------------------------------------  EXAM:  XR CHEST PORTABLE URGENT 1V                          PROCEDURE DATE:  2022      FINDINGS:  Left chest wall dual-lead pacemaker.  The heart is normal in size.  The lungs are clear.  There is no pneumothorax or pleural effusion.    IMPRESSION:  Clear lungs.    KENA CARRINGTON MD; Resident Radiologist  This document has been electronically signed.  SATURNINO CHERRY MD; Attending Radiologist  This document has been electronically signed. 2022 11:40AM  ---------------------------------------------------------------------------------------------------------------  EXAM:  XR CHEST PORTABLE URGENT 1V                          PROCEDURE DATE:  2022      FINDINGS:    Appropriate course of dual-chamber pacemaker. Unremarkable   cardiomediastinal silhouette. Minimal left basilar atelectasis. No   pleural effusion or pneumothorax.      IMPRESSION:    Mild left basilar atelectasis.    JOSEPH GAMINO M.D., ATTENDING RADIOGIST  This document has been electronically signed. Apr  3 2022  9:00AM  ---------------------------------------------------------------------------------------------------------------

## 2022-04-18 NOTE — PROGRESS NOTE ADULT - SUBJECTIVE AND OBJECTIVE BOX
Patient seen and examined  no complaints    No Known Allergies    Hospital Medications:   MEDICATIONS  (STANDING):  acetaminophen     Tablet .. 975 milliGRAM(s) Oral every 8 hours  albuterol/ipratropium for Nebulization 3 milliLiter(s) Nebulizer every 6 hours  atorvastatin 40 milliGRAM(s) Oral at bedtime  buDESOnide    Inhalation Suspension 0.5 milliGRAM(s) Inhalation every 12 hours  chlorhexidine 2% Cloths 1 Application(s) Topical <User Schedule>  finasteride 5 milliGRAM(s) Oral daily  furosemide   Injectable 40 milliGRAM(s) IV Push every 24 hours  gabapentin 300 milliGRAM(s) Oral every 8 hours  guaiFENesin ER 1200 milliGRAM(s) Oral every 12 hours  heparin  Infusion 1500 Unit(s)/Hr (12 mL/Hr) IV Continuous <Continuous>  insulin glargine Injectable (LANTUS) 40 Unit(s) SubCutaneous at bedtime  insulin lispro (ADMELOG) corrective regimen sliding scale   SubCutaneous three times a day before meals  insulin lispro (ADMELOG) corrective regimen sliding scale   SubCutaneous at bedtime  insulin lispro Injectable (ADMELOG) 15 Unit(s) SubCutaneous before breakfast  insulin lispro Injectable (ADMELOG) 12 Unit(s) SubCutaneous before lunch  insulin lispro Injectable (ADMELOG) 12 Unit(s) SubCutaneous before dinner  levothyroxine 25 MICROGram(s) Oral daily  metoprolol succinate ER 25 milliGRAM(s) Oral daily  montelukast 10 milliGRAM(s) Oral daily  multivitamin/minerals 1 Tablet(s) Oral daily  pantoprazole    Tablet 40 milliGRAM(s) Oral before breakfast  piperacillin/tazobactam IVPB.. 3.375 Gram(s) IV Intermittent every 8 hours  polyethylene glycol 3350 17 Gram(s) Oral daily  predniSONE   Tablet   Oral   senna 2 Tablet(s) Oral at bedtime  tamsulosin 0.8 milliGRAM(s) Oral at bedtime        VITALS:  T(F): 97.5 (22 @ 10:11), Max: 98 (22 @ 22:20)  HR: 66 (22 @ 10:11)  BP: 114/52 (22 @ 10:11)  RR: 19 (22 @ 10:11)  SpO2: 92% (22 @ 10:11)  Wt(kg): --     @ 07:01  -   @ 07:00  --------------------------------------------------------  IN: 1622 mL / OUT: 3450 mL / NET: -1828 mL     @ 07:01  -   @ 13:48  --------------------------------------------------------  IN: 564 mL / OUT: 550 mL / NET: 14 mL      PHYSICAL EXAM:  Constitutional: NAD  HEENT: anicteric sclera, oropharynx clear.  Neck: No JVD  Respiratory: CTAB, no wheezes, rales or rhonchi  Cardiovascular: S1, S2, RRR  Gastrointestinal: BS+, soft, NT/ND  Extremities: right BKA  Neurological: A/O x 3, no focal deficits  : No CVA tenderness.  hudson+    LABS:      148<H>  |  110<H>  |  78<H>  ----------------------------<  71  4.2   |  22  |  1.67<H>    Ca    8.7      2022 08:07  Phos  4.6     18  Mg     2.4     18      Creatinine Trend: 1.67 <--, 1.75 <--, 1.62 <--, 1.87 <--, 1.82 <--, 1.59 <--, 1.75 <--, 1.87 <--, 1.78 <--                        8.5    22.51 )-----------( 297      ( 2022 08:07 )             27.8     Urine Studies:  Urinalysis Basic - ( 2022 06:05 )    Color: Light Yellow / Appearance: Clear / S.021 / pH:   Gluc:  / Ketone: Negative  / Bili: Negative / Urobili: Negative   Blood:  / Protein: Trace / Nitrite: Negative   Leuk Esterase: Moderate / RBC: 185 /hpf / WBC 12 /HPF   Sq Epi:  / Non Sq Epi: 2 /hpf / Bacteria: Negative        RADIOLOGY & ADDITIONAL STUDIES:

## 2022-04-18 NOTE — CONSULT NOTE ADULT - ASSESSMENT
88 y/o male pt with left heel discoloration   - pt seen and evaluated  - discoloration, changes to skin of left heel secondary to ischemic changes   - rec cont with z flow boot in bed at all times  - leave open to air at this time  - will monitor periodically for any signs of worsening during this admission

## 2022-04-19 NOTE — PROGRESS NOTE ADULT - ASSESSMENT
86 year old gentleman with a PMH DM, HTN, HLD who has been followed for the past 6 months for foot wounds and leg pain.      EKG -  A sense V paced PVC's  Echo - Mild LV dysfunction mild aortic stenosis    1) pre-op assessment   -pt has h/o moderate LV dysfunction as per echo 2017, no chest pains 2d echo now shows mild LV dysfunction  -12 lead EKG ok,  PPM interrogated  -s/p BKA, optimized from cardiac standpoint, for BKA formalization , recommend pulm optimization as well planned for friday      2) HTN  -controlled  -c/w metoprolol  -continue to monitor BP    3) Chronic systolic CHF   - hold metolazone   -c/w IV lasix 40mg daily. monitor I&Os    4) ?Atrial fib   - coumadin on hold on IV heparin     5) KARLOS  - held losartan and metolazone  -f/u renal

## 2022-04-19 NOTE — SWALLOW BEDSIDE ASSESSMENT ADULT - SLP PERTINENT HISTORY OF CURRENT PROBLEM
86M with a PMH DM, HTN, HLD who has been followed for the past 6 months for foot wounds and leg pain. He underwent and angiogram with Dr. Quinn in september of 2021 and has been followed in the office. He reports having pain in the RLE mostly in the toes and has open wounds with some purulence and gangrene. 4/4/22 patient was taken to the operating room for a right guillotine BKA with plans for a stages AKA formalization. Postoperative course c/b severe COPD exacerbation requiring excalation of O2 supplementation with HFNC. SICU consulted for close respiratory monitoring in the setting of COPD exacerbation.

## 2022-04-19 NOTE — PROGRESS NOTE ADULT - SUBJECTIVE AND OBJECTIVE BOX
VASCULAR SURGERY DAILY PROGRESS NOTE    SUBJECTIVE: Pt seen and examined at bedside. No complaints. Denies chest pain, SOB, palpitations, HA, fever, chills, N/V.      OBJECTIVE:  Vital Signs Last 24 Hrs  T(C): 36.7 (19 Apr 2022 05:20), Max: 36.7 (18 Apr 2022 13:55)  T(F): 98.1 (19 Apr 2022 05:20), Max: 98.1 (18 Apr 2022 13:55)  HR: 69 (19 Apr 2022 05:20) (66 - 78)  BP: 153/74 (19 Apr 2022 05:20) (114/52 - 164/58)  BP(mean): --  RR: 18 (19 Apr 2022 05:20) (16 - 19)  SpO2: 91% (19 Apr 2022 05:20) (91% - 93%)    I&O's Summary    18 Apr 2022 07:01  -  19 Apr 2022 07:00  --------------------------------------------------------  IN: 2008 mL / OUT: 2575 mL / NET: -567 mL      Physical Exam:  Gen: NAD, resting comfortably in bed  CV: Regular rate  Resp: Nonlabored breathing on room air  Ext: MARIAMA JAMES stump with wound vac in place holding appropriate suction, knee immobilizer in place, erythema improved around stump site    LABS:                        7.8    22.85 )-----------( 298      ( 19 Apr 2022 07:26 )             26.4     04-19    144  |  107  |  82<H>  ----------------------------<  175<H>  4.3   |  22  |  1.66<H>    Ca    8.6      19 Apr 2022 07:24  Phos  4.7     04-19  Mg     2.3     04-19      PT/INR - ( 19 Apr 2022 07:26 )   PT: 12.2 sec;   INR: 1.05 ratio         PTT - ( 19 Apr 2022 07:26 )  PTT:57.5 sec      RADIOLOGY & ADDITIONAL STUDIES: VASCULAR SURGERY DAILY PROGRESS NOTE    SUBJECTIVE: Pt seen and examined at bedside. c/o LUE swelling. Denies chest pain, SOB, palpitations, HA, fever, chills, N/V.      OBJECTIVE:  Vital Signs Last 24 Hrs  T(C): 36.7 (19 Apr 2022 05:20), Max: 36.7 (18 Apr 2022 13:55)  T(F): 98.1 (19 Apr 2022 05:20), Max: 98.1 (18 Apr 2022 13:55)  HR: 69 (19 Apr 2022 05:20) (66 - 78)  BP: 153/74 (19 Apr 2022 05:20) (114/52 - 164/58)  BP(mean): --  RR: 18 (19 Apr 2022 05:20) (16 - 19)  SpO2: 91% (19 Apr 2022 05:20) (91% - 93%)    I&O's Summary    18 Apr 2022 07:01  -  19 Apr 2022 07:00  --------------------------------------------------------  IN: 2008 mL / OUT: 2575 mL / NET: -567 mL      Physical Exam:  Gen: NAD, resting comfortably in bed  CV: Regular rate  Resp: Nonlabored breathing on room air  Ext: RLE laura BKA stump with wound vac in place holding appropriate suction, knee immobilizer in place, erythema improved around stump site    LABS:                        7.8    22.85 )-----------( 298      ( 19 Apr 2022 07:26 )             26.4     04-19    144  |  107  |  82<H>  ----------------------------<  175<H>  4.3   |  22  |  1.66<H>    Ca    8.6      19 Apr 2022 07:24  Phos  4.7     04-19  Mg     2.3     04-19      PT/INR - ( 19 Apr 2022 07:26 )   PT: 12.2 sec;   INR: 1.05 ratio         PTT - ( 19 Apr 2022 07:26 )  PTT:57.5 sec      RADIOLOGY & ADDITIONAL STUDIES:

## 2022-04-19 NOTE — CHART NOTE - NSCHARTNOTEFT_GEN_A_CORE
Wound Care Team Note:    Request for wound care consult for foot wound received and referred to wound care team Podiatrists, Marina Charlton/Genaro/Tiffanie. Will defer to Podiatry for management.    Nena Montalvo NP-C, [id number]

## 2022-04-19 NOTE — SWALLOW BEDSIDE ASSESSMENT ADULT - PHARYNGEAL PHASE
increased wheezing/Delayed cough post oral intake increased wheezing -> resolved after ~30 seconds and pt left in NAD/Delayed cough post oral intake

## 2022-04-19 NOTE — SWALLOW BEDSIDE ASSESSMENT ADULT - COMMENTS
4/15: Overnight attempted weaning of insulin gtt but unsuccessful. Concern for CVA however CT negative. Blood cx sent, zosyn started. Transferred to floor.  4/18: ID consulted for elevated WBC - wound had vac over it but was described as having some erythema; Also is coughing has rhonchi and lower lobe infiltrates; WBC could be due to steroids, pna or open wd.    **Swallow hx: patient unknown to this service

## 2022-04-19 NOTE — PROGRESS NOTE ADULT - ASSESSMENT
ASSESSMENT:    86 year old gentleman, former smoker, followed by Dr. Alicja Rob of our practice for asthma/COPD overlap  syndrome and obstructive sleep apnea being treated conservatively. He has no history of TOM colonization/infection as listed in the "past medical history". He has been stable from a pulmonary perspective and maintained on budesonide and duoneb once daily and if needed. He also has a history of HTN, HLD, DM, CKD, CVA, CHF (mild systolic dysfunction) and atrial fibrillation with sick sinus syndrome s/p pacemaker implantation. He has been followed for many months for chronic foot wounds and leg pain now with some purulence and gangrene. The pain is exacerbated when laying in bed and improves with tylenol and when hanging the legs off of the bed. He is no longer able to ambulate. 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 The patient has developed marked shortness of breath with severe hypoxemia currently requiring a nasal canula @ 5lpm to maintain saturation @ 90%. He has a cough with copious mucous in his chest which he is unable to expectorate. He has chest congestion and wheeze. No fevers, chills or sweats. No chest pain/pressure or palpitations. Called by the patient's family and asked to be involved with his pulmonary care.    acute hypoxic respiratory failure -> resolved    1) severe COPD exacerbation -> slowly improving  2) 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 limiting diaphragmatic excursion and respiratory muscle weakness decreasing chest wall expansion -> bibasilar atelectasis  3) no evidence of pulmonary edema or pneumonia on the most recent CXR  4) dysphagia with dyspnea, cough and chest gurgling after eating    leukocytosis more likely related to systemic steroids than infection    steroid induced hyperglycemia    CKD/KARLOS due to diuresis in the setting of euvolemia -> improved    4/14 - remains in the ICU; continues on an insulin infusion for steroid induced hyperglycemia; worsening of his mental status and chronic left sided weakness and left facial droop after analgesics prior to the VAC change yesterday; CT scan and EEG are unremarkable; started on zosyn for possible "sepsis"; now awake and alert sitting in the chair and back to his baseline mental status; no shortness of breath or hypoxemia on room air; occasional cough productive of scant sputum; minimal chest congestion and wheeze; no fevers, chills or sweats; no chest pain/pressure or palpitations; CXR now has bibasilar atelectasis - there is no evidence of pulmonary edema; on heparin gtt for PAD    4/15 - transferred to the surgical floor; mental status is back to baseline; left facial droop and left sided weakness are no worse than usual; continues on zosyn for possible "sepsis"; awake and alert sitting in the chair; no shortness of breath or hypoxemia on room air; occasional cough productive of scant sputum; minimal chest congestion and wheeze; no fevers, chills or sweats; no chest pain/pressure or palpitations; CXR is with a small left pleural effusion and bibasilar atelectasis - there is no evidence of pulmonary edema; on heparin gtt for PAD    PLAN/RECOMMENDATIONS:    stable oxygenation on room air  EEG -> mild to moderate nonspecific diffuse or multifocal cerebral dysfunction -  no epileptiform patterns or seizures recorded.  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, stable in appearance compared with prior dated 9/8/2019  CXR 4/18 - improving bilateral lower lobe opacaities  started on zosyn for "sepsis" given increasing leukocytosis - blood cultures are negative - no evidence of a UTI on U/A - ID evaluation noted  prednisone 30mg daily - taper by 10mg every 2 days - glucose control on steroids has improved - off an insulin gtt  albuterol/atrovent nebs q6h  pulmicort 0.5mg nebs q12h - give after duoneb - rinse mouth after use  mucinex 1200mg 2 times daily  singulair 10mg @ bedtime  acapella device/incentive spirometer  speech and swallow evaluation ongoing  respiratory status continues to improve - his hypoxemia has resolved and bronchospasm has improved - at this point, there is no pulmonary contraindication to the proposed AKA  cardiac meds: lipitor/toprol XL/lasix  flomax/proscar  vascular surgery follow-up    heparin gtt    pain control    AKA scheduled for 4/22  GI prophylaxis - protonix  proscar/flomax  bowel regimen  out of bed and into the chair  LUE Duplex    Will follow with you. Plan of care discussed with the patient and his family at bedside and with Dr. Spaulding.    Sarabjit Wright MD, [facility]  [phone]  Pulmonary Medicine

## 2022-04-19 NOTE — PROVIDER CONTACT NOTE (OTHER) - ACTION/TREATMENT ORDERED:
Continue to monitor Ho Barry MD stated he will follow up with the day team. Continue to monitor at this time

## 2022-04-19 NOTE — SWALLOW BEDSIDE ASSESSMENT ADULT - SWALLOW EVAL: DIAGNOSIS
Patient is s/p BKA; postoperative course c/b severe COPD exacerbation. Scheduled for AKA completion on 4/22. Patient seen for bedside swallow evaluation 2/2 reports of coughing/gurgling during PO intake. Patient presents with a suspected pharyngeal dysphagia characterized by delayed congested coughing and increased wheezing post intake of most conservative PO textures.

## 2022-04-19 NOTE — SWALLOW BEDSIDE ASSESSMENT ADULT - SLP GENERAL OBSERVATIONS
Patient encountered awake and alert, OOB in chair, on RA, leaning to L side despite being propped with pillows (left sided weakness 2/2 hx of CVA). A&Ox4, +wheezing at baseline (pt with COPD). Lunch tray present and pt noted with food on chest/stomach - SLP removed food debris. Pt denied feeling SOB despite obvious wheezing. Able to follow commands and make wants/needs known. Vocal quality strained/hypophonic likely 2/2 poor breath support.

## 2022-04-19 NOTE — PROGRESS NOTE ADULT - SUBJECTIVE AND OBJECTIVE BOX
[facility] - PROGRESS NOTE    CHIEF COMPLAINT: acute hypoxic respiratory failure; COPD exacerbation; mucous plugging; atelectasis; weak cough; pleural effusion; dysphagia; right foot gangrene s/p BKA and awaiting AKA    INTERVAL HISTORY: left arm swelling -> awaiting a Duplex of the left upper extremity; FEEST scheduled for tomorrow to better evaluate dysphagia; mental status is back to baseline; left facial droop and left sided weakness are no worse than usual; continues on zosyn for possible "sepsis"; awake and alert sitting in the chair; no shortness of breath or hypoxemia on room air; occasional cough productive of scant sputum; minimal chest congestion and wheeze; no fevers, chills or sweats; no chest pain/pressure or palpitations; CXR is with a small left pleural effusion and bibasilar atelectasis - there is no evidence of pulmonary edema; on heparin gtt for PAD    REVIEW OF SYSTEMS:  Constitutional: As per interval history  HEENT: Within normal limits  CV: As per interval history  Resp: As per interval history  GI: dysphagia  : KARLOS -> resolved  Musculoskeletal: s/p right BKA  Skin: Within normal limits  Neurological: Within normal limits  Psychiatric: Within normal limits  Endocrine: hyperglycemia  Hematologic/Lymphatic: Within normal limits  Allergic/Immunologic: Within normal limits    MEDICATIONS:     Pulmonary "  albuterol/ipratropium for Nebulization 3 milliLiter(s) Nebulizer every 6 hours  buDESOnide    Inhalation Suspension 0.5 milliGRAM(s) Inhalation every 12 hours  guaiFENesin ER 1200 milliGRAM(s) Oral every 12 hours  montelukast 10 milliGRAM(s) Oral daily    Anti-microbials:  piperacillin/tazobactam IVPB.. 3.375 Gram(s) IV Intermittent every 8 hours    Cardiovascular:  furosemide   Injectable 40 milliGRAM(s) IV Push every 24 hours  metoprolol succinate ER 25 milliGRAM(s) Oral daily  tamsulosin 0.8 milliGRAM(s) Oral at bedtime    Other:  acetaminophen     Tablet .. 975 milliGRAM(s) Oral every 8 hours  atorvastatin 40 milliGRAM(s) Oral at bedtime  chlorhexidine 2% Cloths 1 Application(s) Topical <User Schedule>  finasteride 5 milliGRAM(s) Oral daily  gabapentin 300 milliGRAM(s) Oral every 8 hours  heparin  Infusion 1250 Unit(s)/Hr IV Continuous <Continuous>  insulin glargine Injectable (LANTUS) 37 Unit(s) SubCutaneous at bedtime  insulin lispro (ADMELOG) corrective regimen sliding scale   SubCutaneous three times a day before meals  insulin lispro (ADMELOG) corrective regimen sliding scale   SubCutaneous at bedtime  insulin lispro Injectable (ADMELOG) 13 Unit(s) SubCutaneous before breakfast  insulin lispro Injectable (ADMELOG) 10 Unit(s) SubCutaneous before lunch  insulin lispro Injectable (ADMELOG) 10 Unit(s) SubCutaneous before dinner  levothyroxine 25 MICROGram(s) Oral daily  multivitamin/minerals 1 Tablet(s) Oral daily  pantoprazole    Tablet 40 milliGRAM(s) Oral before breakfast  polyethylene glycol 3350 17 Gram(s) Oral daily  predniSONE   Tablet 30 milliGRAM(s) Oral daily  predniSONE   Tablet   Oral   senna 2 Tablet(s) Oral at bedtime    MEDICATIONS  (PRN):  traMADol 25 milliGRAM(s) Oral every 4 hours PRN Moderate Pain (4 - 6)  traMADol 50 milliGRAM(s) Oral every 4 hours PRN Severe Pain (7 - 10)    OBJECTIVE:    POCT Blood Glucose.: 131 mg/dL (2022 13:03)  POCT Blood Glucose.: 190 mg/dL (2022 07:44)  POCT Blood Glucose.: 205 mg/dL (2022 22:13)      PHYSICAL EXAM:       ICU Vital Signs Last 24 Hrs  T(C): 36.7 (2022 13:08), Max: 36.9 (2022 10:02)  T(F): 98.1 (2022 13:08), Max: 98.5 (2022 10:02)  HR: 59 (2022 13:08) (59 - 78)  BP: 140/71 (2022 13:08) (123/63 - 164/58)  BP(mean): --  ABP: --  ABP(mean): --  RR: 18 (2022 13:08) (18 - 18)  SpO2: 94% (2022 13:08) (91% - 94%) on room air     General: Awake. Alert. Cooperative. No distress. Appears stated age. Obese.   HEENT: Atraumatic. Normocephalic. Anicteric. Normal oral mucosa. PERRL. EOMI.  Neck: Supple. Trachea midline. Thyroid without enlargement/tenderness/nodules. No carotid bruit. No JVD.	  Cardiovascular: Regular rate and rhythm. Distant S1 S2. No murmurs, rubs or gallops.  Respiratory: Respirations unlabored. Bilateral rhonchi and wheeze. No curvature.  Abdomen: Soft. Non-tender. Non-distended. No organomegaly. No masses. Normal bowel sounds.  Extremities: Warm to touch. No clubbing or cyanosis. No pedal edema. Right BKA with VAC dressing. Swelling and pitting edema of the left upper extremity  Pulses: Decreased peripheral pulses LLE  Skin: Venous stasis changes left lower extremity  Lymph Nodes: Cervical, supraclavicular and axillary nodes normal  Neurological: Left sided weakness left > arm. Left facial droop. A and O x 3  Psychiatry: Appropriate mood and affect.    LABS:                          7.8    22.85 )-----------( 298      ( 2022 07:26 )             26.4     CBC    WBC  22.85 <==, 22.51 <==, 19.99 <==, 17.75 <==, 20.97 <==, 21.40 <==, 19.73 <==    Hemoglobin  7.8 <<==, 8.5 <<==, 8.4 <<==, 8.4 <<==, 7.9 <<==, 7.8 <<==, 8.3 <<==    Hematocrit  26.4 <==, 27.8 <==, 28.5 <==, 29.0 <==, 25.4 <==, 25.6 <==, 27.6 <==    Platelets  298 <==, 297 <==, 302 <==, 287 <==, 320 <==, 281 <==, 264 <==      144  |  107  |  82<H>  ----------------------------<  175<H>    04-19  4.3   |  22  |  1.66<H>      LYTES    sodium  144 <==, 148 <==, 149 <==, 149 <==, 145 <==, 144 <==, 140 <==    potassium   4.3 <==, 4.2 <==, 3.9 <==, 4.1 <==, 5.0 <==, 4.6 <==, 6.8 <==    chloride  107 <==, 110 <==, 112 <==, 112 <==, 112 <==, 109 <==, 106 <==    carbon dioxide  22 <==, 22 <==, 20 <==, 20 <==, 19 <==, 21 <==, 20 <==    =============================================================================================  RENAL FUNCTION:    Creatinine:   1.66  <<==, 1.67  <<==, 1.75  <<==, 1.62  <<==, 1.87  <<==, 1.82  <<==, 1.59  <<==    BUN:   82 <==, 78 <==, 86 <==, 91 <==, 96 <==, 99 <==, 99 <==    ============================================================================================    calcium   8.6 <==, 8.7 <==, 8.7 <==, 8.8 <==, 9.1 <==, 9.0 <==, 8.9 <==    phos   4.7 <==, 4.6 <==, 4.9 <==, 5.0 <==, 4.4 <==, 4.2 <==, 4.6 <==    mag   2.3 <==, 2.4 <==, 2.5 <==, 2.6 <==, 2.6 <==, 2.5 <==, 2.6 <==    ============================================================================================  PT/INR - ( 2022 07:26 )   PT: 12.2 sec;   INR: 1.05 ratio       PTT - ( 2022 16:11 )  PTT:49.2 sec    ABG - ( 2022 18:43 )  pH: 7.43  /  pCO2: 35    /  pO2: 80    / HCO3: 23    / Base Excess: -0.8  /  SaO2: 99.0      Venous Blood Gas:   @ 22:10  7.40/41/52/25/84.4  VBG Lactate: 1.8    Venous Blood Gas:   @ 22:23  7.40/39/45/24/78.1  VBG Lactate: 2.4    Venous Blood Gas:   @ 22:23  7.40/39/45/24/78.1  VBG Lactate: 2.4    Venous Blood Gas:  04-10 @ 23:19  7.39/42/44/25/72.2  VBG Lactate: 2.5    < from: TTE with Doppler (w/Cont) (22 @ 15:07) >    Patient name: Martir Heart  YOB: 1935   Age: 86 (M)   MR#: [id number]  Study Date: 4/3/2022  Location: [address] [place], [place] [id number]: Angie Olivarez RDCS  Study quality: Technically difficult  Referring Physician: Anmol Quinn MD  BloodPressure: 115/70 mmHg  Height: 173 cm  Weight: 92 kg  BSA: 2.1 m2  ------------------------------------------------------------------------  PROCEDURE: Transthoracic echocardiogram with 2-D, M-Mode  and complete spectral and color flow Doppler. Verbal  consent was obtained for injection of  Ultrasonic Enhancing  Agent following a discussion of risks and benefits.  Following intravenous injection of Ultrasonic Enhancing  Agent, harmonic imaging was performed.  INDICATION: Cardiomyopathy, unspecified (I42.9)  ------------------------------------------------------------------------  Dimensions:    Normal Values:  LA:     3.1    2.0 - 4.0 cm  Ao:     3.5    2.0 - 3.8 cm  SEPTUM: 1.1    0.6 - 1.2 cm  PWT:    1.3    0.6 - 1.1 cm  LVIDd:  4.3    3.0- 5.6 cm  LVIDs:  3.2    1.8 - 4.0 cm  Derived variables:  LVMI: 90 g/m2  RWT: 0.60  Fractional short: 26 %  EF (Visual Estimate): NWV %  Doppler Peak Velocity (m/sec): MV=1.6 AoV=1.4  ------------------------------------------------------------------------  Observations:  Mitral Valve: Mitral valve not well visualized. Mitral  annular calcification. Peak mitral valve gradient equals 10  mm Hg, mean transmitral valve gradient equals 4 mm Hg,  consistent with mild mitral stenosis.  Aortic Valve/Aorta: Aortic valve not well visualized;  appears calcified. Peak transaortic valve gradient equals 8  mm Hg, mean transaortic valve gradient equals 3 mm Hg,  estimated aortic valve area equals 2 sqcm (by continuity  equation), aortic valve velocity time integral equals 22  cm, consistent with mild aortic stenosis. Peak left  ventricular outflow tract gradient equals 3 mm Hg, mean  gradient is equal to 1 mm Hg, LVOT velocity time integral  equals 12 cm.  Aortic Root: 3.5 cm.  Left Atrium: Normal left atrium.  Left Ventricle: Endocardial visualization enhanced with  intravenous injection of Ultrasonic Enhancing Agent  (Definity). Mild left ventricular systolic dysfunction. The  inferior wall, and the inferoseptum are hypokinetic.  Normal left ventricular internal dimensions and wall  thicknesses. Unable to evaluate diastology.  Right Heart: A device wire is noted in the right heart. The  right ventricle is not well visualized; grossly normal  right ventricular systolic function. Tricuspid valve not  well visualized. Pulmonic valve not well visualized,  probably normal.  Pericardium/Pleura: Normal pericardium with trace  pericardial effusion.  Hemodynamic: Estimated right atrial pressure is 8 mm Hg.  ------------------------------------------------------------------------  Conclusions:  1. Aortic valve not well visualized; appears calcified.  Peak transaortic valve gradient equals 8 mm Hg, mean  transaortic valve gradient equals 3 mm Hg, estimated aortic  valve area equals 2 sqcm (by continuity equation), aortic  valve velocity time integral equals 22 cm, consistent with  mild aortic stenosis.  2. Endocardial visualization enhanced with intravenous  injection of Ultrasonic Enhancing Agent (Definity). Mild  left ventricular systolic dysfunction. The inferior wall,  and the inferoseptum are hypokinetic.  3. The right ventricle is not well visualized; grossly  normal right ventricular systolic function.  ------------------------------------------------------------------------  Confirmed on  4/3/2022 - 17:12:14 by BRITTANY Giraldo  ------------------------------------------------------------------------  --------------------------------------------------------------------------------------------------------------  ---------------------------------------------------------------------------------------------------------------  MICROBIOLOGY:     COVID-19 PCR . (22 @ 18:23)   COVID-19 PCR: NotDetec:     Urinalysis Basic - ( 2022 06:05 )    Color: Light Yellow / Appearance: Clear / S.021 / pH: x  Gluc: x / Ketone: Negative  / Bili: Negative / Urobili: Negative   Blood: x / Protein: Trace / Nitrite: Negative   Leuk Esterase: Moderate / RBC: 185 /hpf / WBC 12 /HPF   Sq Epi: x / Non Sq Epi: 2 /hpf / Bacteria: Negative    Culture - Blood (22 @ 11:16)   Specimen Source: .Blood Blood-Peripheral   Culture Results:   No growth to date.     Culture - Blood (22 @ 11:16)   Specimen Source: .Blood Blood-Peripheral   Culture Results:   No growth to date.     RADIOLOGY:  [x] Chest radiographs reviewed and interpreted by me    EXAM:  XR CHEST PORTABLE URGENT 1V                          PROCEDURE DATE:  2022      FINDINGS:  Left chest wall pacemaker.  The heart size cannot be accurately evaluated on this projection.  Bilateral lower lung hazy opacities, reduced since 2022.  There is no pneumothorax.    IMPRESSION:  Partial clearing of the bases and left effusion.    ANITA INFANTE MD; Resident Radiologist  This document has been electronically signed.  HORACIO HELMS MD; Attending Interventional Radiologist  Thisdocument has been electronically signed. 2022  3:57PM  --------------------------------------------------------------------------------------------------------------  EXAM:  CT BRAIN                          PROCEDURE DATE:  2022      FINDINGS:    Prominence of ventricles and subarachnoid spaces, compatible with   atrophy. Periventricular small vessel white matter ischemic changes.   Encephalomalacia and gliosis is appreciated bilaterally compatible with   old regions of infarction, noted in a high left posterior frontal   location, right posterior parietal/occipital location, and right   cerebellar location. Old ischemic event is also appreciated along the   anterior limb of the internal capsule on the left and along the external   capsular region on the right.    There is prominence of the bifrontal extra-axial regions, suggestive of   bifrontal subdural hygromas, more prominent on the left than the   right-stable compared with prior. Prominent deposition of calcified   plaque within the bilateral carotid siphons, as on prior.    Deposition of calcium appreciated within the bilateral basal ganglia, as   on prior.    No acute intracranial hemorrhage. No intraparenchymal mass lesion, mass   effect, or midline shift.    Appearance of the bilateral optic lenses suggests the patient has   previously undergone prior cataract surgery.    Imaged paranasal sinuses, bilateral mastoid air cells, middle ear   cavities are clear.    IMPRESSION:  1. No acute intracranial hemorrhage.    2. 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 No evidence of an acute ischemic event.    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    DAWN BEHR-VENTURA MD; Attending Radiologist  This document has been electronically signed. 2022  8:25PM  ---------------------------------------------------------------------------------------------------------------   EXAM:  XR CHEST PORTABLE ROUTINE 1V                          PROCEDURE DATE:  2022      FINDINGS:    Support devices: A pacemaker overlies the left chest wall with its leads   intact.    Cardiac/mediastinum/hilum: Heart size cannot be accurately assessed on   this projection.    Lung parenchyma/Pleura: Right lower lung hazy opacities. Bibasilar   atelectasis. Trace left pleural effusion, unchanged. There is no   pneumothorax.    Skeleton/soft tissues: No acute osseous abnormalities.    IMPRESSION:    Right lower lung hazy opacities, which may represent atelectasis versus   infection.    Unchanged trace left pleural effusion.    EDITH HER MD; Resident Radiologist  This document has been electronically signed.  EVON MAN MD; Attending Radiologist  This document has been electronically signed. 2022  5:11PM  ---------------------------------------------------------------------------------------------------------------  EXAM:  XR CHEST PORTABLE ROUTINE 1V                          PROCEDURE DATE:  04/10/2022      FINDINGS:  Left pacemaker with leads in the right atrium and ventricle.    The heart size is not accurately assessed on this projection.  Bilateral lower lung field patchy opacities, left greater than right.  There is no pneumothorax. Trace left pleural effusion.    IMPRESSION:  Bilateral patchy opacities, left greater than right, differential   includes atelectasis or infection.    JAYDON MONTEMAYOR MD; Resident Radiologist  This document has been electronically signed.  SATURNINO CHERRY MD; Attending Radiologist  This document has been electronically signed. Apr 10 2022  9:14AM  --------------------------------------------------------------------------------------------  EXAM:  XR CHEST PORTABLE URGENT 1V                          PROCEDURE DATE:  2022      FINDINGS:  Left chest wall dual-lead pacemaker. Rotated exam limits assessment for   lead tip.    Small left pleural effusion with adjacent opacity. No pneumothorax.    Cardiac size cannot accurately be assessed in this projection.  Aortic   calcifications.      IMPRESSION: Small left pleural effusion with adjacent atelectasis   obscuring evaluation of the underlying lung.    DRE SANCHEZ MD; Resident Radiology  This document has been electronically signed.  WAGNER LAM MD; Attending Radiologist  This document has been electronically signed. 2022  5:54PM  ---------------------------------------------------------------------------------------------------------------  EXAM:  XR CHEST PORTABLE URGENT 1V                          PROCEDURE DATE:  2022      FINDINGS:  Left chest wall dual-lead pacemaker.  The heart is normal in size.  The lungs are clear.  There is no pneumothorax or pleural effusion.    IMPRESSION:  Clear lungs.    KENA CARRINGTON MD; Resident Radiologist  This document has been electronically signed.  SATURNINO CHERRY MD; Attending Radiologist  This document has been electronically signed. 2022 11:40AM  ---------------------------------------------------------------------------------------------------------------  EXAM:  XR CHEST PORTABLE URGENT 1V                          PROCEDURE DATE:  2022      FINDINGS:    Appropriate course of dual-chamber pacemaker. Unremarkable   cardiomediastinal silhouette. Minimal left basilar atelectasis. No   pleural effusion or pneumothorax.      IMPRESSION:    Mild left basilar atelectasis.    JOSEPH GAMINO M.D., ATTENDING RADIOGIST  This document has been electronically signed. Apr  3 2022  9:00AM  ---------------------------------------------------------------------------------------------------------------

## 2022-04-19 NOTE — SWALLOW BEDSIDE ASSESSMENT ADULT - SWALLOW EVAL: PATIENT/FAMILY GOALS STATEMENT
Questionable historian. Pt endorsed coughing during PO intake at baseline, but denied dysphagia being related to hx of CVA. Stated he never "wheezed this much" prior to hospitalization.

## 2022-04-19 NOTE — PROGRESS NOTE ADULT - ASSESSMENT
87 yo male ex  smoker, h/o HTN, HLD, DM, CKD, CVA, CHF (mild systolic dysfunction) and atrial fibrillation with sick sinus syndrome s/p pacemaker implantation.  h/o COPD/ZACKARY, TOM colonization/infection, admitted on 4/1 with RLE wet gangrene, s/p R guillotine BKA, and is awaiting a staged right lower extremity AKA. on heparin gtt for PAD  post op course complicated by acute hypoxic respiratory failure 2/2 COPD exacerbation. now resolved  ptn seen on 9 Jaime  Ptn is now stable on po prednisone taper, pulm following  albuterol/atrovent nebs   pulmicort 0.5mg nebs q12h   mucinex 1200mg 2 times daily  singulair 10mg @ bedtime  acapella device/incentive spirometer  on RA 91-92% pulse Ox  steroid induced hyperglycemia, now off insulin drip, on Lantus and prandial Admelog  on IV Zosyn, cleared by Pulm, card, ID for planned for staged RLE AKA. medically cleared  wbc remains elevated, prob 2/2 steroids. ID on board,   MS back at baseline  CKD3/s/p KARLOS due to diuresis in the setting of euvolemia -> improved, renal following  HTN, systolic CHF, Afib stable, cardiology following  awaiting a staged right lower extremity AKA. medically cleared for surgery pending ID clearance

## 2022-04-19 NOTE — SWALLOW BEDSIDE ASSESSMENT ADULT - ASR SWALLOW RECOMMEND DIAG
Recommend FEES to further assess the swallow mechanism given evidence of potentially severe pharyngeal dysphagia during bedside swallow evaluation and reduced trunk control in chair resulting in significant leaning to L side. Explained procedure and rationale - pt agreeable./FEES

## 2022-04-19 NOTE — PROGRESS NOTE ADULT - SUBJECTIVE AND OBJECTIVE BOX
Patient seen and examined  no complaints    No Known Allergies    Hospital Medications:   MEDICATIONS  (STANDING):  acetaminophen     Tablet .. 975 milliGRAM(s) Oral every 8 hours  albuterol/ipratropium for Nebulization 3 milliLiter(s) Nebulizer every 6 hours  atorvastatin 40 milliGRAM(s) Oral at bedtime  buDESOnide    Inhalation Suspension 0.5 milliGRAM(s) Inhalation every 12 hours  chlorhexidine 2% Cloths 1 Application(s) Topical <User Schedule>  finasteride 5 milliGRAM(s) Oral daily  furosemide   Injectable 40 milliGRAM(s) IV Push every 24 hours  gabapentin 300 milliGRAM(s) Oral every 8 hours  guaiFENesin ER 1200 milliGRAM(s) Oral every 12 hours  heparin  Infusion 1250 Unit(s)/Hr (12.5 mL/Hr) IV Continuous <Continuous>  insulin glargine Injectable (LANTUS) 36 Unit(s) SubCutaneous at bedtime  insulin lispro (ADMELOG) corrective regimen sliding scale   SubCutaneous three times a day before meals  insulin lispro (ADMELOG) corrective regimen sliding scale   SubCutaneous at bedtime  insulin lispro Injectable (ADMELOG) 13 Unit(s) SubCutaneous before breakfast  insulin lispro Injectable (ADMELOG) 10 Unit(s) SubCutaneous before lunch  insulin lispro Injectable (ADMELOG) 10 Unit(s) SubCutaneous before dinner  levothyroxine 25 MICROGram(s) Oral daily  metoprolol succinate ER 25 milliGRAM(s) Oral daily  montelukast 10 milliGRAM(s) Oral daily  multivitamin/minerals 1 Tablet(s) Oral daily  pantoprazole    Tablet 40 milliGRAM(s) Oral before breakfast  piperacillin/tazobactam IVPB.. 3.375 Gram(s) IV Intermittent every 8 hours  polyethylene glycol 3350 17 Gram(s) Oral daily  predniSONE   Tablet 30 milliGRAM(s) Oral daily  predniSONE   Tablet   Oral   senna 2 Tablet(s) Oral at bedtime  tamsulosin 0.8 milliGRAM(s) Oral at bedtime        VITALS:  T(F): 98.5 (22 @ 10:02), Max: 98.5 (22 @ 10:02)  HR: 65 (22 @ 10:02)  BP: 123/63 (22 @ 10:02)  RR: 18 (22 @ 10:02)  SpO2: 93% (22 @ 12:00)  Wt(kg): --     @ 07:01  -   @ 07:00  --------------------------------------------------------  IN: 2008 mL / OUT: 2575 mL / NET: -567 mL     @ 07:01  -   @ 13:08  --------------------------------------------------------  IN: 480 mL / OUT: 1000 mL / NET: -520 mL    PHYSICAL EXAM:  Constitutional: NAD  HEENT: anicteric sclera, oropharynx clear.  Neck: No JVD  Respiratory: CTAB, no wheezes, rales or rhonchi  Cardiovascular: S1, S2, RRR  Gastrointestinal: BS+, soft, NT/ND  Extremities: right BKA  Neurological: A/O x 3, no focal deficits      LABS:      144  |  107  |  82<H>  ----------------------------<  175<H>  4.3   |  22  |  1.66<H>    Ca    8.6      2022 07:24  Phos  4.7       Mg     2.3           Creatinine Trend: 1.66 <--, 1.67 <--, 1.75 <--, 1.62 <--, 1.87 <--, 1.82 <--, 1.59 <--, 1.75 <--, 1.87 <--                        7.8    22.85 )-----------( 298      ( 2022 07:26 )             26.4     Urine Studies:  Urinalysis Basic - ( 2022 06:05 )    Color: Light Yellow / Appearance: Clear / S.021 / pH:   Gluc:  / Ketone: Negative  / Bili: Negative / Urobili: Negative   Blood:  / Protein: Trace / Nitrite: Negative   Leuk Esterase: Moderate / RBC: 185 /hpf / WBC 12 /HPF   Sq Epi:  / Non Sq Epi: 2 /hpf / Bacteria: Negative        RADIOLOGY & ADDITIONAL STUDIES:

## 2022-04-19 NOTE — PROGRESS NOTE ADULT - ASSESSMENT
86 year old with PVD, COPD, admitted with progressive infected wounds for right BKA. Also with history of TOM (not being treated at present AF, ERCP for stones. Pt had guillotine BKA. Post op had exacerbation of COPD. Received Prednisone in the icu. Has had increasing wbc last several days  (22K)    wound had vac over it but was described as having some erythema .  Also is coughing has rhonchi and lower lobe infiltrates.    WBC could be due to steroids, pna or open wd       continue zosyn for now      follow up chest x-ray better  can we taper  steroids ?

## 2022-04-19 NOTE — PROGRESS NOTE ADULT - SUBJECTIVE AND OBJECTIVE BOX
Patient is a 87y old  Male who presents with a chief complaint of Preoperative Planning for Right above knee amputation (19 Apr 2022 16:31)      SUBJECTIVE / OVERNIGHT EVENTS: no new events, on prednisone taper, on IV Zosyn, cleared by Pulm, card, ID for planned for staged RLE AKA    MEDICATIONS  (STANDING):  acetaminophen     Tablet .. 975 milliGRAM(s) Oral every 8 hours  albuterol/ipratropium for Nebulization 3 milliLiter(s) Nebulizer every 6 hours  atorvastatin 40 milliGRAM(s) Oral at bedtime  buDESOnide    Inhalation Suspension 0.5 milliGRAM(s) Inhalation every 12 hours  chlorhexidine 2% Cloths 1 Application(s) Topical <User Schedule>  finasteride 5 milliGRAM(s) Oral daily  furosemide   Injectable 40 milliGRAM(s) IV Push every 24 hours  gabapentin 300 milliGRAM(s) Oral every 8 hours  guaiFENesin ER 1200 milliGRAM(s) Oral every 12 hours  heparin  Infusion 1250 Unit(s)/Hr (14.5 mL/Hr) IV Continuous <Continuous>  insulin glargine Injectable (LANTUS) 37 Unit(s) SubCutaneous at bedtime  insulin lispro (ADMELOG) corrective regimen sliding scale   SubCutaneous three times a day before meals  insulin lispro (ADMELOG) corrective regimen sliding scale   SubCutaneous at bedtime  insulin lispro Injectable (ADMELOG) 13 Unit(s) SubCutaneous before breakfast  insulin lispro Injectable (ADMELOG) 10 Unit(s) SubCutaneous before lunch  insulin lispro Injectable (ADMELOG) 10 Unit(s) SubCutaneous before dinner  levothyroxine 25 MICROGram(s) Oral daily  metoprolol succinate ER 25 milliGRAM(s) Oral daily  montelukast 10 milliGRAM(s) Oral daily  multivitamin/minerals 1 Tablet(s) Oral daily  pantoprazole    Tablet 40 milliGRAM(s) Oral before breakfast  piperacillin/tazobactam IVPB.. 3.375 Gram(s) IV Intermittent every 8 hours  polyethylene glycol 3350 17 Gram(s) Oral daily  predniSONE   Tablet 30 milliGRAM(s) Oral daily  predniSONE   Tablet   Oral   senna 2 Tablet(s) Oral at bedtime  tamsulosin 0.8 milliGRAM(s) Oral at bedtime    MEDICATIONS  (PRN):  traMADol 25 milliGRAM(s) Oral every 4 hours PRN Moderate Pain (4 - 6)  traMADol 50 milliGRAM(s) Oral every 4 hours PRN Severe Pain (7 - 10)      Vital Signs Last 24 Hrs  T(F): 98.2 (04-19-22 @ 21:34), Max: 98.5 (04-19-22 @ 10:02)  HR: 73 (04-19-22 @ 21:34) (59 - 78)  BP: 167/74 (04-19-22 @ 21:34) (123/63 - 167/74)  RR: 18 (04-19-22 @ 21:34) (18 - 18)  SpO2: 96% (04-19-22 @ 21:34) (91% - 96%)  Telemetry:   CAPILLARY BLOOD GLUCOSE      POCT Blood Glucose.: 152 mg/dL (19 Apr 2022 21:12)  POCT Blood Glucose.: 97 mg/dL (19 Apr 2022 17:51)  POCT Blood Glucose.: 131 mg/dL (19 Apr 2022 13:03)  POCT Blood Glucose.: 190 mg/dL (19 Apr 2022 07:44)  POCT Blood Glucose.: 205 mg/dL (18 Apr 2022 22:13)    I&O's Summary    18 Apr 2022 07:01  -  19 Apr 2022 07:00  --------------------------------------------------------  IN: 2008 mL / OUT: 2575 mL / NET: -567 mL    19 Apr 2022 07:01  -  19 Apr 2022 21:55  --------------------------------------------------------  IN: 1060 mL / OUT: 2800 mL / NET: -1740 mL        PHYSICAL EXAM:  GENERAL: NAD, well-developed  HEAD:  Atraumatic, Normocephalic  EYES: EOMI, PERRLA, conjunctiva and sclera clear  NECK: Supple, No JVD  CHEST/LUNG: Clear to auscultation bilaterally; No wheeze  HEART: Regular rate and rhythm; No murmurs, rubs, or gallops  ABDOMEN: Soft, Nontender, Nondistended; Bowel sounds present  EXTREMITIES:  2+ Peripheral Pulses, No clubbing, cyanosis, or edema  PSYCH: AAOx3  NEUROLOGY: non-focal  SKIN: No rashes or lesions    LABS:                        7.8    22.85 )-----------( 298      ( 19 Apr 2022 07:26 )             26.4     04-19    144  |  107  |  82<H>  ----------------------------<  175<H>  4.3   |  22  |  1.66<H>    Ca    8.6      19 Apr 2022 07:24  Phos  4.7     04-19  Mg     2.3     04-19      PT/INR - ( 19 Apr 2022 07:26 )   PT: 12.2 sec;   INR: 1.05 ratio         PTT - ( 19 Apr 2022 16:11 )  PTT:49.2 sec          RADIOLOGY & ADDITIONAL TESTS:    Imaging Personally Reviewed:    Consultant(s) Notes Reviewed:      Care Discussed with Consultants/Other Providers:

## 2022-04-19 NOTE — PROGRESS NOTE ADULT - SUBJECTIVE AND OBJECTIVE BOX
DIABETES FOLLOW UP : Seen earlier today    INTERVAL HX:  spoke with team pt for FEES tomorrow, will remain on diet today per team; BG mostly at goal 131-205 past 24 hours.  pt reports tolerating po , PT also at bedside during visit , OR planning friday    Review of Systems:  General: As above  Cardiovascular: No chest pain  Respiratory: No SOB  GI: No nausea, vomiting  Endocrine: no  S&Sx of hypoglycemia    Allergies    No Known Allergies    Intolerances      MEDICATIONS  (STANDING):  acetaminophen     Tablet .. 975 milliGRAM(s) Oral every 8 hours  albuterol/ipratropium for Nebulization 3 milliLiter(s) Nebulizer every 6 hours  atorvastatin 40 milliGRAM(s) Oral at bedtime  buDESOnide    Inhalation Suspension 0.5 milliGRAM(s) Inhalation every 12 hours  chlorhexidine 2% Cloths 1 Application(s) Topical <User Schedule>  finasteride 5 milliGRAM(s) Oral daily  furosemide   Injectable 40 milliGRAM(s) IV Push every 24 hours  gabapentin 300 milliGRAM(s) Oral every 8 hours  guaiFENesin ER 1200 milliGRAM(s) Oral every 12 hours  heparin  Infusion 1250 Unit(s)/Hr (12.5 mL/Hr) IV Continuous <Continuous>  insulin glargine Injectable (LANTUS) 36 Unit(s) SubCutaneous at bedtime  insulin lispro (ADMELOG) corrective regimen sliding scale   SubCutaneous three times a day before meals  insulin lispro (ADMELOG) corrective regimen sliding scale   SubCutaneous at bedtime  insulin lispro Injectable (ADMELOG) 13 Unit(s) SubCutaneous before breakfast  insulin lispro Injectable (ADMELOG) 10 Unit(s) SubCutaneous before lunch  insulin lispro Injectable (ADMELOG) 10 Unit(s) SubCutaneous before dinner  levothyroxine 25 MICROGram(s) Oral daily  metoprolol succinate ER 25 milliGRAM(s) Oral daily  montelukast 10 milliGRAM(s) Oral daily  multivitamin/minerals 1 Tablet(s) Oral daily  pantoprazole    Tablet 40 milliGRAM(s) Oral before breakfast  piperacillin/tazobactam IVPB.. 3.375 Gram(s) IV Intermittent every 8 hours  polyethylene glycol 3350 17 Gram(s) Oral daily  predniSONE   Tablet 30 milliGRAM(s) Oral daily  predniSONE   Tablet   Oral   senna 2 Tablet(s) Oral at bedtime  tamsulosin 0.8 milliGRAM(s) Oral at bedtime      PHYSICAL EXAM:  VITALS: T(C): 36.7 (04-19-22 @ 13:08)  T(F): 98.1 (04-19-22 @ 13:08), Max: 98.5 (04-19-22 @ 10:02)  HR: 59 (04-19-22 @ 13:08) (59 - 78)  BP: 140/71 (04-19-22 @ 13:08) (123/63 - 164/58)  RR:  (18 - 18)  SpO2:  (91% - 94%)  Wt(kg): --  GENERAL: male laying in bed in NAD  Abdomen: Soft, nontender, non distended  Extremities: Warm; right AKA immobilizer present  NEURO: Alert appropriate     LABS:  POCT Blood Glucose.: 131 mg/dL (04-19-22 @ 13:03)  POCT Blood Glucose.: 190 mg/dL (04-19-22 @ 07:44)  POCT Blood Glucose.: 205 mg/dL (04-18-22 @ 22:13)  POCT Blood Glucose.: 142 mg/dL (04-18-22 @ 17:20)  POCT Blood Glucose.: 107 mg/dL (04-18-22 @ 12:01)  POCT Blood Glucose.: 85 mg/dL (04-18-22 @ 07:53)  POCT Blood Glucose.: 141 mg/dL (04-17-22 @ 22:27)  POCT Blood Glucose.: 187 mg/dL (04-17-22 @ 17:12)  POCT Blood Glucose.: 146 mg/dL (04-17-22 @ 12:16)  POCT Blood Glucose.: 133 mg/dL (04-17-22 @ 07:55)  POCT Blood Glucose.: 139 mg/dL (04-16-22 @ 21:41)  POCT Blood Glucose.: 117 mg/dL (04-16-22 @ 17:10)                            7.8    22.85 )-----------( 298      ( 19 Apr 2022 07:26 )             26.4       04-19    144  |  107  |  82<H>  ----------------------------<  175<H>  4.3   |  22  |  1.66<H>    Ca    8.6      19 Apr 2022 07:24  Phos  4.7     04-19  Mg     2.3     04-19        eGFR: 40 mL/min/1.73m2 (19 Apr 2022 07:24)          Thyroid Function Tests:          A1C with Estimated Average Glucose Result: 6.8 % (04-02-22 @ 12:21)      Estimated Average Glucose: 148 mg/dL (04-02-22 @ 12:21)

## 2022-04-19 NOTE — PROGRESS NOTE ADULT - ASSESSMENT
86M with PMH of COPD (not on home o2), prior smoking history (40 pack years), HTN, HLD, Afib, CHF, T2DM, CVA, BPH, and PAD admitted for elective RLE AKA. Renal consulted for CKD Mx.    KARLOS on CKD 3,   baseline Cr ~1.7  serum k stable  bicarb stable  rise likely 2/2 hemodynamic changes--> ATN  cont monitor Serum Creatinine   cont  lasix 40mg iv qd for now given lower ext edema  Na is 144meq  off losartan   monitor BMP daily and u/o   dose all meds for eGFR  avoid NSAIDs/Nephrotoxics.      Rt LE nonhealing wound:  s/p amputation  follow up with vascular  Plan AKA friday      Dr Knight  [phone]

## 2022-04-19 NOTE — PROGRESS NOTE ADULT - SUBJECTIVE AND OBJECTIVE BOX
Ritesh Bell MD  Interventional Cardiology / Endovascular Specialist  [place] Office : [phone] [place] [address] [place] [address]  Tel:   [place] Office : [phone] [facility] [place] [address]  Tel: [phone]    Subjective/Overnight events: Pt is lying in bed comfortable not in distress  	  MEDICATIONS:  furosemide   Injectable 40 milliGRAM(s) IV Push every 24 hours  heparin  Infusion 1250 Unit(s)/Hr IV Continuous <Continuous>  metoprolol succinate ER 25 milliGRAM(s) Oral daily  tamsulosin 0.8 milliGRAM(s) Oral at bedtime    piperacillin/tazobactam IVPB.. 3.375 Gram(s) IV Intermittent every 8 hours    albuterol/ipratropium for Nebulization 3 milliLiter(s) Nebulizer every 6 hours  buDESOnide    Inhalation Suspension 0.5 milliGRAM(s) Inhalation every 12 hours  guaiFENesin ER 1200 milliGRAM(s) Oral every 12 hours  montelukast 10 milliGRAM(s) Oral daily    acetaminophen     Tablet .. 975 milliGRAM(s) Oral every 8 hours  gabapentin 300 milliGRAM(s) Oral every 8 hours  traMADol 25 milliGRAM(s) Oral every 4 hours PRN  traMADol 50 milliGRAM(s) Oral every 4 hours PRN    pantoprazole    Tablet 40 milliGRAM(s) Oral before breakfast  polyethylene glycol 3350 17 Gram(s) Oral daily  senna 2 Tablet(s) Oral at bedtime    atorvastatin 40 milliGRAM(s) Oral at bedtime  finasteride 5 milliGRAM(s) Oral daily  insulin glargine Injectable (LANTUS) 37 Unit(s) SubCutaneous at bedtime  insulin lispro (ADMELOG) corrective regimen sliding scale   SubCutaneous three times a day before meals  insulin lispro (ADMELOG) corrective regimen sliding scale   SubCutaneous at bedtime  insulin lispro Injectable (ADMELOG) 13 Unit(s) SubCutaneous before breakfast  insulin lispro Injectable (ADMELOG) 10 Unit(s) SubCutaneous before lunch  insulin lispro Injectable (ADMELOG) 10 Unit(s) SubCutaneous before dinner  levothyroxine 25 MICROGram(s) Oral daily  predniSONE   Tablet 30 milliGRAM(s) Oral daily  predniSONE   Tablet   Oral     chlorhexidine 2% Cloths 1 Application(s) Topical <User Schedule>  multivitamin/minerals 1 Tablet(s) Oral daily      PAST MEDICAL/SURGICAL HISTORY  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    Deep Vein Thrombosis (DVT)  17 yeras ago on Coumadin    CHF (congestive heart failure)  last exacerbation in 1/2017    Enlarged prostate    GERD (gastroesophageal reflux disease)    Hernia  umblical    Calculus of bile duct without cholangitis or cholecystitis without obstruction    Atrial fibrillation    S/P Hernia Repair    S/P cataract surgery, unspecified laterality    S/P ERCP  3/2017        SOCIAL HISTORY: Substance Use (street drugs): ( x ) never used  (  ) other:    FAMILY HISTORY:      REVIEW OF SYSTEMS:  CONSTITUTIONAL: No fever, weight loss, or fatigue  EYES: No eye pain, visual disturbances, or discharge  ENMT:  No difficulty hearing, tinnitus, vertigo; No sinus or throat pain  BREASTS: No pain, masses, or nipple discharge  GASTROINTESTINAL: No abdominal or epigastric pain. No nausea, vomiting, or hematemesis; No diarrhea or constipation. No melena or hematochezia.  GENITOURINARY: No dysuria, frequency, hematuria, or incontinence  NEUROLOGICAL: No headaches, memory loss, loss of strength, numbness, or tremors  ENDOCRINE: No heat or cold intolerance; No hair loss  MUSCULOSKELETAL: No joint pain or swelling; No muscle, back, or extremity pain  PSYCHIATRIC: No depression, anxiety, mood swings, or difficulty sleeping  HEME/LYMPH: No easy bruising, or bleeding gums  All others negative    PHYSICAL EXAM:  T(C): 36.7 (04-19-22 @ 13:08), Max: 36.9 (04-19-22 @ 10:02)  HR: 59 (04-19-22 @ 13:08) (59 - 78)  BP: 140/71 (04-19-22 @ 13:08) (123/63 - 164/58)  RR: 18 (04-19-22 @ 13:08) (18 - 18)  SpO2: 94% (04-19-22 @ 13:08) (91% - 94%)  Wt(kg): --  I&O's Summary    18 Apr 2022 07:01  -  19 Apr 2022 07:00  --------------------------------------------------------  IN: 2008 mL / OUT: 2575 mL / NET: -567 mL    19 Apr 2022 07:01  -  19 Apr 2022 16:31  --------------------------------------------------------  IN: 720 mL / OUT: 1000 mL / NET: -280 mL        EYES:   PERRLA   ENMT:   Moist mucous membranes, Good dentition, No lesions  Cardiovascular: Normal S1 S2, No JVD, No murmurs, No edema  Respiratory: b/l rhonchi   Gastrointestinal:  Soft, Non-tender, + BS	  Extremities: s/p right BKA                             7.8    22.85 )-----------( 298      ( 19 Apr 2022 07:26 )             26.4     04-19    144  |  107  |  82<H>  ----------------------------<  175<H>  4.3   |  22  |  1.66<H>    Ca    8.6      19 Apr 2022 07:24  Phos  4.7     04-19  Mg     2.3     04-19      proBNP:   Lipid Profile:   HgA1c:   TSH:     Consultant(s) Notes Reviewed:  [x ] YES  [ ] NO    Care Discussed with Consultants/Other Providers [ x] YES  [ ] NO    Imaging Personally Reviewed independently:  [x] YES  [ ] NO    All labs, radiologic studies, vitals, orders and medications list reviewed. Patient is seen and examined at bedside. Case discussed with medical team.

## 2022-04-19 NOTE — PROGRESS NOTE ADULT - ASSESSMENT
87 y/o M w/ uncontrolled Type 2 DM complicated by neuropathy and nephropathy with hyperglycemia exacerbated by steroids, HTN, HLD, hypothyroidism admitted for right LE AKA now on basal/bolus. BG goal (100-180mg/dl).       Uncontrolled type 2 diabetes mellitus with hyperglycemia.    Pt on prednisone taper reduced to 30mg daily 4/19-4/21. Tolerating POs; OR planning Friday  -test BG AC/HS  -Increase Lantus 37 units QHS  -c/w Admelog 13-10-10 w/meals  -c/w Admelog moderate correction scale AC and mod HS scale  -cons carb diet  -Prednisone taper ongoing  Outpt. endo follow-up  Outpt. optho, podiatry, nephrology  Plan to d/c on basal + orals including SGLT2 given CKD. Would avoid metformin and sulfonylurea.    Essential hypertension.   ·  Plan: Goal BP <140/90, on metoprolol.    Hyperlipidemia.   ·  Plan: on statin.    Hypothyroidism.   ·  Plan: continue levothyroxine. Repeat TFTs as outpatient    Discussed with patient and primary  team Vascular   Contact via [employer] Teams during business hours  On evenings and weekends, please call [phone] or page endocrine fellow on call.   Please note that this patient may be followed by different provider tomorrow.    Time spent on encounter: 28  minutes spent on total encounter; The necessity of the time spent during the encounter on this date of service was due to development of plan of care/coordination of care/glycemic control through review of labs, blood glucose values and vital signs.

## 2022-04-19 NOTE — PROGRESS NOTE ADULT - SUBJECTIVE AND OBJECTIVE BOX
infectious diseases progress note:    Patient is a 87y old  Male who presents with a chief complaint of Preoperative Planning for Right above knee amputation (18 Apr 2022 21:23)        Gangrene, not elsewhere classified    Peripheral vascular disease               Allergies    No Known Allergies    Intolerances        ANTIBIOTICS/RELEVANT:  antimicrobials  piperacillin/tazobactam IVPB.. 3.375 Gram(s) IV Intermittent every 8 hours    immunologic:    OTHER:  acetaminophen     Tablet .. 975 milliGRAM(s) Oral every 8 hours  albuterol/ipratropium for Nebulization 3 milliLiter(s) Nebulizer every 6 hours  atorvastatin 40 milliGRAM(s) Oral at bedtime  buDESOnide    Inhalation Suspension 0.5 milliGRAM(s) Inhalation every 12 hours  chlorhexidine 2% Cloths 1 Application(s) Topical <User Schedule>  finasteride 5 milliGRAM(s) Oral daily  furosemide   Injectable 40 milliGRAM(s) IV Push every 24 hours  gabapentin 300 milliGRAM(s) Oral every 8 hours  guaiFENesin ER 1200 milliGRAM(s) Oral every 12 hours  heparin  Infusion 1250 Unit(s)/Hr IV Continuous <Continuous>  insulin glargine Injectable (LANTUS) 36 Unit(s) SubCutaneous at bedtime  insulin lispro (ADMELOG) corrective regimen sliding scale   SubCutaneous three times a day before meals  insulin lispro (ADMELOG) corrective regimen sliding scale   SubCutaneous at bedtime  insulin lispro Injectable (ADMELOG) 13 Unit(s) SubCutaneous before breakfast  insulin lispro Injectable (ADMELOG) 10 Unit(s) SubCutaneous before lunch  insulin lispro Injectable (ADMELOG) 10 Unit(s) SubCutaneous before dinner  levothyroxine 25 MICROGram(s) Oral daily  metoprolol succinate ER 25 milliGRAM(s) Oral daily  montelukast 10 milliGRAM(s) Oral daily  multivitamin/minerals 1 Tablet(s) Oral daily  pantoprazole    Tablet 40 milliGRAM(s) Oral before breakfast  polyethylene glycol 3350 17 Gram(s) Oral daily  predniSONE   Tablet 30 milliGRAM(s) Oral daily  predniSONE   Tablet   Oral   senna 2 Tablet(s) Oral at bedtime  tamsulosin 0.8 milliGRAM(s) Oral at bedtime  traMADol 25 milliGRAM(s) Oral every 4 hours PRN  traMADol 50 milliGRAM(s) Oral every 4 hours PRN      Objective:  Vital Signs Last 24 Hrs  T(C): 36.7 (19 Apr 2022 05:20), Max: 36.7 (18 Apr 2022 13:55)  T(F): 98.1 (19 Apr 2022 05:20), Max: 98.1 (18 Apr 2022 13:55)  HR: 69 (19 Apr 2022 05:20) (66 - 78)  BP: 153/74 (19 Apr 2022 05:20) (114/52 - 164/58)  BP(mean): --  RR: 18 (19 Apr 2022 05:20) (16 - 19)  SpO2: 91% (19 Apr 2022 05:20) (91% - 93%)       Eyes:PETRONA, EOMI  Ear/Nose/Throat: no oral lesion, no sinus tenderness on percussion	  Neck:no JVD, no lymphadenopathy, supple  Respiratory: CTA leatha  Cardiovascular: S1S2 RRR, no murmurs  Gastrointestinal:soft, (+) BS, no HSM  Extremities:no e/e/c        LABS:                        7.8    22.85 )-----------( 298      ( 19 Apr 2022 07:26 )             26.4     04-19    144  |  107  |  82<H>  ----------------------------<  175<H>  4.3   |  22  |  1.66<H>    Ca    8.6      19 Apr 2022 07:24  Phos  4.7     04-19  Mg     2.3     04-19      PT/INR - ( 19 Apr 2022 07:26 )   PT: 12.2 sec;   INR: 1.05 ratio         PTT - ( 19 Apr 2022 07:26 )  PTT:57.5 sec        MICROBIOLOGY:    RECENT CULTURES:  04-14 @ 11:16 .Blood Blood-Peripheral                No growth to date.          RESPIRATORY CULTURES:              RADIOLOGY & ADDITIONAL STUDIES:        Pager [phone]  After 5 pm/weekends or if no response :[phone]

## 2022-04-19 NOTE — PROVIDER CONTACT NOTE (OTHER) - SITUATION
Ho Barry MD was informed of 3+ pitting edema to the left arm and index finger pain. MD came to access pt, he will follow up with plan of care.

## 2022-04-19 NOTE — PROGRESS NOTE ADULT - ASSESSMENT
ASSESSMENT/PLAN: 86M PMH HTN, HLD, DM2 and nonhealing wounds of RLE who is non-ambulatory at home now s/p right guillotine BELOW knee amputation. Postoperative course c/b severe COPD exacerbation requiring excalation of O2 supplementation with HFNC. SICU consulted for close respiratory monitoring in the setting of COPD exacerbation. Off insulin gtt, on room air.     - OR for R AKA completion on Friday.   - Needs cardiology and medicine clearance.   - Continue wound vac changes M/W/F  - Continue hep gtt, goal aPTT 59-99  - Continue IV abx: Zosyn per ID rec  - Pain control  - Consistent carb diet  - Insulin sliding scale, basal + pre-meal insulin  - Bowel regimen  - Continue steroid taper, respiratory treatment  - Replete electrolytes prn  - Per PODS re: left heel discoloration- cont with z flow boot in bed at all times, leave open to air  - Appreciate cards, pulm, nephro, and medicine recs      Vascular Surgery  [id number]       ASSESSMENT/PLAN: 86M PMH HTN, HLD, DM2 and nonhealing wounds of RLE who is non-ambulatory at home now s/p right guillotine BELOW knee amputation. Postoperative course c/b severe COPD exacerbation requiring excalation of O2 supplementation with HFNC. SICU consulted for close respiratory monitoring in the setting of COPD exacerbation. Off insulin gtt, on room air.     - f/u LUE duplex r/o dvt  - OR for R AKA completion on Friday.   - Needs cardiology and medicine clearance.   - Continue wound vac changes M/W/F  - Continue hep gtt, goal aPTT 59-99  - Continue IV abx: Zosyn per ID rec  - Pain control  - Consistent carb diet  - Insulin sliding scale, basal + pre-meal insulin  - Bowel regimen  - Continue steroid taper, respiratory treatment  - Replete electrolytes prn  - Per PODS re: left heel discoloration- cont with z flow boot in bed at all times, leave open to air  - Appreciate cards, pulm, nephro, and medicine recs      Vascular Surgery  [id number]

## 2022-04-20 NOTE — PROGRESS NOTE ADULT - SUBJECTIVE AND OBJECTIVE BOX
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    INTERVAL HISTORY: left arm swelling ->no evidence of DVT on Duplex; FEEST revealed severe dysphagia -> non oral nutrition recommended -> the family has elected to continue oral feeding understanding the risks of aspiration; mental status is back to baseline; left facial droop and left sided weakness are no worse than usual; continues on zosyn for possible "sepsis"; awake and alert sitting in the chair; no shortness of breath or hypoxemia on room air; occasional cough productive of scant sputum; mild chest congestion and wheeze especially after eating; no fevers, chills or sweats; no chest pain/pressure or palpitations; CXR reveals improved bibasilar atelectasis - there is no evidence of pulmonary edema; on heparin gtt for PAD    REVIEW OF SYSTEMS:  Constitutional: As per interval history  HEENT: Within normal limits  CV: As per interval history  Resp: As per interval history  GI: dysphagia  : KARLOS -> resolved  Musculoskeletal: s/p right BKA  Skin: Within normal limits  Neurological: Within normal limits  Psychiatric: Within normal limits  Endocrine: hyperglycemia  Hematologic/Lymphatic: Within normal limits  Allergic/Immunologic: Within normal limits    MEDICATIONS:     Pulmonary "  albuterol/ipratropium for Nebulization 3 milliLiter(s) Nebulizer every 6 hours  buDESOnide    Inhalation Suspension 0.5 milliGRAM(s) Inhalation every 12 hours  guaiFENesin ER 1200 milliGRAM(s) Oral every 12 hours  montelukast 10 milliGRAM(s) Oral daily    Anti-microbials:  piperacillin/tazobactam IVPB.. 3.375 Gram(s) IV Intermittent every 8 hours    Cardiovascular:  furosemide   Injectable 40 milliGRAM(s) IV Push every 24 hours  metoprolol succinate ER 25 milliGRAM(s) Oral daily  tamsulosin 0.8 milliGRAM(s) Oral at bedtime    Other:  acetaminophen     Tablet .. 975 milliGRAM(s) Oral every 8 hours  atorvastatin 40 milliGRAM(s) Oral at bedtime  chlorhexidine 2% Cloths 1 Application(s) Topical <User Schedule>  finasteride 5 milliGRAM(s) Oral daily  gabapentin 300 milliGRAM(s) Oral every 8 hours  heparin  Infusion 1250 Unit(s)/Hr IV Continuous <Continuous>  insulin glargine Injectable (LANTUS) 34 Unit(s) SubCutaneous at bedtime  insulin lispro (ADMELOG) corrective regimen sliding scale   SubCutaneous three times a day before meals  insulin lispro (ADMELOG) corrective regimen sliding scale   SubCutaneous at bedtime  insulin lispro Injectable (ADMELOG) 10 Unit(s) SubCutaneous before dinner  insulin lispro Injectable (ADMELOG) 8 Unit(s) SubCutaneous before lunch  levothyroxine 25 MICROGram(s) Oral daily  multivitamin/minerals 1 Tablet(s) Oral daily  pantoprazole    Tablet 40 milliGRAM(s) Oral before breakfast  polyethylene glycol 3350 17 Gram(s) Oral daily  predniSONE   Tablet   Oral   senna 2 Tablet(s) Oral at bedtime    MEDICATIONS  (PRN):  traMADol 25 milliGRAM(s) Oral every 4 hours PRN Moderate Pain (4 - 6)  traMADol 50 milliGRAM(s) Oral every 4 hours PRN Severe Pain (7 - 10)      OBJECTIVE:    Daily Weight in k.1 (2022 10:25)    POCT Blood Glucose.: 285 mg/dL (2022 13:46)  POCT Blood Glucose.: 138 mg/dL (2022 08:00)  POCT Blood Glucose.: 152 mg/dL (2022 21:12)  POCT Blood Glucose.: 97 mg/dL (2022 17:51)      PHYSICAL EXAM:       ICU Vital Signs Last 24 Hrs  T(C): 36.3 (2022 14:04), Max: 36.8 (2022 18:10)  T(F): 97.4 (2022 14:04), Max: 98.3 (2022 18:10)  HR: 75 (2022 14:04) (71 - 75)  BP: 165/69 (2022 14:04) (145/79 - 167/74)  BP(mean): --  ABP: --  ABP(mean): --  RR: 18 (2022 14:04) (17 - 18)  SpO2: 93% (2022 14:04) (91% - 96%) on room air     General: Awake. Alert. Cooperative. Severe cough after eating thickened fluids. Appears stated age. Obese. Sitting in the chair  HEENT: Atraumatic. Normocephalic. Anicteric. Normal oral mucosa. PERRL. EOMI.  Neck: Supple. Trachea midline. Thyroid without enlargement/tenderness/nodules. No carotid bruit. No JVD.	  Cardiovascular: Regular rate and rhythm. Distant S1 S2. No murmurs, rubs or gallops.  Respiratory: Respirations unlabored. Bilateral rhonchi and wheeze. No curvature.  Abdomen: Soft. Non-tender. Non-distended. No organomegaly. No masses. Normal bowel sounds.  Extremities: Warm to touch. No clubbing or cyanosis. No pedal edema. Right BKA with VAC dressing. Swelling and pitting edema of the left upper extremity  Pulses: Decreased peripheral pulses LLE  Skin: Venous stasis changes left lower extremity  Lymph Nodes: Cervical, supraclavicular and axillary nodes normal  Neurological: Left sided weakness left > arm. Left facial droop. A and O x 3  Psychiatry: Appropriate mood and affect.    LABS:                          7.8    21.90 )-----------( 279      ( 2022 07:33 )             26.6     CBC    WBC  21.90 <==, 22.85 <==, 22.51 <==, 19.99 <==, 17.75 <==, 20.97 <==, 21.40 <==    Hemoglobin  7.8 <<==, 7.8 <<==, 8.5 <<==, 8.4 <<==, 8.4 <<==, 7.9 <<==, 7.8 <<==    Hematocrit  26.6 <==, 26.4 <==, 27.8 <==, 28.5 <==, 29.0 <==, 25.4 <==, 25.6 <==    Platelets  279 <==, 298 <==, 297 <==, 302 <==, 287 <==, 320 <==, 281 <==      143  |  107  |  79<H>  ----------------------------<  128<H>    04-20  4.2   |  22  |  1.72<H>      LYTES    sodium  143 <==, 144 <==, 148 <==, 149 <==, 149 <==, 145 <==, 144 <==    potassium   4.2 <==, 4.3 <==, 4.2 <==, 3.9 <==, 4.1 <==, 5.0 <==, 4.6 <==    chloride  107 <==, 107 <==, 110 <==, 112 <==, 112 <==, 112 <==, 109 <==    carbon dioxide  22 <==, 22 <==, 22 <==, 20 <==, 20 <==, 19 <==, 21 <==    =============================================================================================  RENAL FUNCTION:    Creatinine:   1.72  <<==, 1.66  <<==, 1.67  <<==, 1.75  <<==, 1.62  <<==, 1.87  <<==, 1.82  <<==    BUN:   79 <==, 82 <==, 78 <==, 86 <==, 91 <==, 96 <==, 99 <==    ============================================================================================    calcium   8.4 <==, 8.6 <==, 8.7 <==, 8.7 <==, 8.8 <==, 9.1 <==, 9.0 <==    phos   4.7 <==, 4.7 <==, 4.6 <==, 4.9 <==, 5.0 <==, 4.4 <==, 4.2 <==    mag   2.4 <==, 2.3 <==, 2.4 <==, 2.5 <==, 2.6 <==, 2.6 <==, 2.5 <==    ============================================================================================  PT/INR - ( 2022 07:26 )   PT: 12.2 sec;   INR: 1.05 ratio       PTT - ( 2022 08:58 )  PTT: 74.7 sec    ABG - ( 2022 18:43 )  pH: 7.43  /  pCO2: 35    /  pO2: 80    / HCO3: 23    / Base Excess: -0.8  /  SaO2: 99.0      Venous Blood Gas:   @ 22:10  7.40/41/52/25/84.4  VBG Lactate: 1.8    Venous Blood Gas:   @ 22:23  7.40/39/45/24/78.1  VBG Lactate: 2.4    Venous Blood Gas:   @ 22:23  7.40/39/45/24/78.1  VBG Lactate: 2.4    Venous Blood Gas:  04-10 @ 23:19  7.39/42/44/25/72.2  VBG Lactate: 2.5    < from: TTE with Doppler (w/Cont) (22 @ 15:07) >    Patient name: Martir Heart  YOB: 1935   Age: 86 (M)   MR#: [id number]  Study Date: 4/3/2022  Location: [address] [place], [place] [id number]: Angie Olivarez RDCS  Study quality: Technically difficult  Referring Physician: Anmol Quinn MD  BloodPressure: 115/70 mmHg  Height: 173 cm  Weight: 92 kg  BSA: 2.1 m2  ------------------------------------------------------------------------  PROCEDURE: Transthoracic echocardiogram with 2-D, M-Mode  and complete spectral and color flow Doppler. Verbal  consent was obtained for injection of  Ultrasonic Enhancing  Agent following a discussion of risks and benefits.  Following intravenous injection of Ultrasonic Enhancing  Agent, harmonic imaging was performed.  INDICATION: Cardiomyopathy, unspecified (I42.9)  ------------------------------------------------------------------------  Dimensions:    Normal Values:  LA:     3.1    2.0 - 4.0 cm  Ao:     3.5    2.0 - 3.8 cm  SEPTUM: 1.1    0.6 - 1.2 cm  PWT:    1.3    0.6 - 1.1 cm  LVIDd:  4.3    3.0- 5.6 cm  LVIDs:  3.2    1.8 - 4.0 cm  Derived variables:  LVMI: 90 g/m2  RWT: 0.60  Fractional short: 26 %  EF (Visual Estimate): NWV %  Doppler Peak Velocity (m/sec): MV=1.6 AoV=1.4  ------------------------------------------------------------------------  Conclusions:  1. Aortic valve not well visualized; appears calcified.  Peak transaortic valve gradient equals 8 mm Hg, mean  transaortic valve gradient equals 3 mm Hg, estimated aortic  valve area equals 2 sqcm (by continuity equation), aortic  valve velocity time integral equals 22 cm, consistent with  mild aortic stenosis.  2. Endocardial visualization enhanced with intravenous  injection of Ultrasonic Enhancing Agent (Definity). Mild  left ventricular systolic dysfunction. The inferior wall,  and the inferoseptum are hypokinetic.  3. The right ventricle is not well visualized; grossly  normal right ventricular systolic function.  ------------------------------------------------------------------------  Confirmed on  4/3/2022 - 17:12:14 by BRITTANY Giraldo  ------------------------------------------------------------------------  -----------------------------------------------------------  MICROBIOLOGY:     COVID-19 PCR . (22 @ 18:23)   COVID-19 PCR: NotDetec:     Urinalysis Basic - ( 2022 06:05 )    Color: Light Yellow / Appearance: Clear / S.021 / pH: x  Gluc: x / Ketone: Negative  / Bili: Negative / Urobili: Negative   Blood: x / Protein: Trace / Nitrite: Negative   Leuk Esterase: Moderate / RBC: 185 /hpf / WBC 12 /HPF   Sq Epi: x / Non Sq Epi: 2 /hpf / Bacteria: Negative    Culture - Blood (22 @ 11:16)   Specimen Source: .Blood Blood-Peripheral   Culture Results:   No growth to date.     Culture - Blood (22 @ 11:16)   Specimen Source: .Blood Blood-Peripheral   Culture Results:   No growth to date.     RADIOLOGY:  [x] Chest radiographs reviewed and interpreted by me    EXAM:  DUPLEX EXT VEINS UPPER LT                          PROCEDURE DATE:  2022      IMPRESSION:  No evidence of left upper extremity deep venous thrombosis.    NARCISO MUÑOZ MD; Attending Radiologist  This document has been electronically signed. 2022  8:48PM  ---------------------------------------------------------------------------------------------------------------  EXAM:  XR CHEST PORTABLE URGENT 1V                          PROCEDURE DATE:  2022      FINDINGS:  Left chest wall pacemaker.  The heart size cannot be accurately evaluated on this projection.  Bilateral lower lung hazy opacities, reduced since 2022.  There is no pneumothorax.    IMPRESSION:  Partial clearing of the bases and left effusion.    ANITA INFANTE MD; Resident Radiologist  This document has been electronically signed.  HORACIO HELMS MD; Attending Interventional Radiologist  Thisdocument has been electronically signed. 2022  3:57PM  --------------------------------------------------------------------------------------------------------------  EXAM:  CT BRAIN                          PROCEDURE DATE:  2022      FINDINGS:    Prominence of ventricles and subarachnoid spaces, compatible with   atrophy. Periventricular small vessel white matter ischemic changes.   Encephalomalacia and gliosis is appreciated bilaterally compatible with   old regions of infarction, noted in a high left posterior frontal   location, right posterior parietal/occipital location, and right   cerebellar location. Old ischemic event is also appreciated along the   anterior limb of the internal capsule on the left and along the external   capsular region on the right.    There is prominence of the bifrontal extra-axial regions, suggestive of   bifrontal subdural hygromas, more prominent on the left than the   right-stable compared with prior. Prominent deposition of calcified   plaque within the bilateral carotid siphons, as on prior.    Deposition of calcium appreciated within the bilateral basal ganglia, as   on prior.    No acute intracranial hemorrhage. No intraparenchymal mass lesion, mass   effect, or midline shift.    Appearance of the bilateral optic lenses suggests the patient has   previously undergone prior cataract surgery.    Imaged paranasal sinuses, bilateral mastoid air cells, middle ear   cavities are clear.    IMPRESSION:  1. No acute intracranial hemorrhage.    2. 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 No evidence of an acute ischemic event.    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    DAWN BEHR-VENTURA MD; Attending Radiologist  This document has been electronically signed. 2022  8:25PM  ---------------------------------------------------------------------------------------------------------------   EXAM:  XR CHEST PORTABLE ROUTINE 1V                          PROCEDURE DATE:  2022      FINDINGS:    Support devices: A pacemaker overlies the left chest wall with its leads   intact.    Cardiac/mediastinum/hilum: Heart size cannot be accurately assessed on   this projection.    Lung parenchyma/Pleura: Right lower lung hazy opacities. Bibasilar   atelectasis. Trace left pleural effusion, unchanged. There is no   pneumothorax.    Skeleton/soft tissues: No acute osseous abnormalities.    IMPRESSION:    Right lower lung hazy opacities, which may represent atelectasis versus   infection.    Unchanged trace left pleural effusion.    EDITH HER MD; Resident Radiologist  This document has been electronically signed.  EVON MAN MD; Attending Radiologist  This document has been electronically signed. 2022  5:11PM  ---------------------------------------------------------------------------------------------------------------  EXAM:  XR CHEST PORTABLE ROUTINE 1V                          PROCEDURE DATE:  04/10/2022      FINDINGS:  Left pacemaker with leads in the right atrium and ventricle.    The heart size is not accurately assessed on this projection.  Bilateral lower lung field patchy opacities, left greater than right.  There is no pneumothorax. Trace left pleural effusion.    IMPRESSION:  Bilateral patchy opacities, left greater than right, differential   includes atelectasis or infection.    JAYDON MONTEMAYOR MD; Resident Radiologist  This document has been electronically signed.  SATURNINO CHERRY MD; Attending Radiologist  This document has been electronically signed. Apr 10 2022  9:14AM  --------------------------------------------------------------------------------------------  EXAM:  XR CHEST PORTABLE URGENT 1V                          PROCEDURE DATE:  2022      FINDINGS:  Left chest wall dual-lead pacemaker. Rotated exam limits assessment for   lead tip.    Small left pleural effusion with adjacent opacity. No pneumothorax.    Cardiac size cannot accurately be assessed in this projection.  Aortic   calcifications.      IMPRESSION: Small left pleural effusion with adjacent atelectasis   obscuring evaluation of the underlying lung.    DRE SANCHEZ MD; Resident Radiology  This document has been electronically signed.  WAGNER LAM MD; Attending Radiologist  This document has been electronically signed. 2022  5:54PM  ---------------------------------------------------------------------------------------------------------------  EXAM:  XR CHEST PORTABLE URGENT 1V                          PROCEDURE DATE:  2022      FINDINGS:  Left chest wall dual-lead pacemaker.  The heart is normal in size.  The lungs are clear.  There is no pneumothorax or pleural effusion.    IMPRESSION:  Clear lungs.    KENA CARRINGTON MD; Resident Radiologist  This document has been electronically signed.  SATURNINO CHERRY MD; Attending Radiologist  This document has been electronically signed. 2022 11:40AM  ---------------------------------------------------------------------------------------------------------------  EXAM:  XR CHEST PORTABLE URGENT 1V                          PROCEDURE DATE:  2022      FINDINGS:    Appropriate course of dual-chamber pacemaker. Unremarkable   cardiomediastinal silhouette. Minimal left basilar atelectasis. No   pleural effusion or pneumothorax.      IMPRESSION:    Mild left basilar atelectasis.    JOSEPH GAMINO M.D., ATTENDING RADIOGIST  This document has been electronically signed. Apr  3 2022  9:00AM  ---------------------------------------------------------------------------------------------------------------

## 2022-04-20 NOTE — SWALLOW FEES ASSESSMENT ADULT - COMMENTS
4/15: Overnight attempted weaning of insulin gtt but unsuccessful. Concern for CVA however CT negative. Blood cx sent, zosyn started. Transferred to floor.  4/18: ID consulted for elevated WBC - wound had vac over it but was described as having some erythema; Also is coughing has rhonchi and lower lobe infiltrates; WBC could be due to steroids, pna or open wd.    **Swallow hx: patient unknown to this service +trace dried yellow secretions on posterior pharyngeal wall   +small glottic gap

## 2022-04-20 NOTE — SWALLOW FEES ASSESSMENT ADULT - UNSUCCESSFUL STRATEGIES TRIALED DURING PROCEDURE
Chin tuck was unsuccessful in improving airway protection. There was deep penetration to the vocal folds with trace residue remaining on b/l vocal folds, laryngeal surface of the arytenoids, b/l AE folds, and in the interarytenoid space

## 2022-04-20 NOTE — CHART NOTE - NSCHARTNOTEFT_GEN_A_CORE
87 y/o M w/ uncontrolled Type 2 DM complicated by neuropathy and nephropathy with hyperglycemia exacerbated by steroids, HTN, HLD, hypothyroidism admitted for right LE AKA now on basal/bolus. BG goal (100-180mg/dl).      POC glucose, insulin requirements, lab values reviewed. BG  past 24 hours, noted tight control at dinner, lunch admelog adjusted,  steroids to be decreased to prednisone 20mg starting tomorrow, now on puree diet after FEES and GOC discussion. Hyperglycemic at lunch as breakfast prandial insulin held, pt received puree/thick for FEES testing.  Noted lunch admelog held as pt NPO at lunch time, expect BG at dinner to be elevated if pt now eating lunch without coverage.  Lower regimen to prevent tightly controlled BG as steroids decreased.     Uncontrolled type 2 diabetes mellitus with hyperglycemia.    Pt on prednisone taper ordered for reduction to 20mg daily to start 4/21. OR planning Friday for R AKA completion  -test BG AC/HS  -Decrease Lantus 34 units QHS  -c/w Admelog 11-8-10 w/meals> after pt receives dinner admelog dose today lower admelog to 8 units at dinner to start tomorrow 4/21  -c/w Admelog moderate correction scale AC and mod HS scale  -on puree diet, please  add CHO   -Prednisone taper ongoing  Outpt. endo follow-up  Outpt. optho, podiatry, nephrology  Plan to d/c on basal + orals including SGLT2 given CKD. Would avoid metformin and sulfonylurea.    d/w Resident To [facility]iss    CAPILLARY BLOOD GLUCOSE      POCT Blood Glucose.: 285 mg/dL (20 Apr 2022 13:46)  POCT Blood Glucose.: 138 mg/dL (20 Apr 2022 08:00)  POCT Blood Glucose.: 152 mg/dL (19 Apr 2022 21:12)  POCT Blood Glucose.: 97 mg/dL (19 Apr 2022 17:51) 87 y/o M w/ uncontrolled Type 2 DM complicated by neuropathy and nephropathy with hyperglycemia exacerbated by steroids, HTN, HLD, hypothyroidism admitted for right LE AKA now on basal/bolus. BG goal (100-180mg/dl).      POC glucose, insulin requirements, lab values reviewed. BG  past 24 hours, noted tight control at dinner, lunch admelog adjusted,  steroids to be decreased to prednisone 20mg starting tomorrow, now on puree diet after FEES and GOC discussion. Hyperglycemic at lunch as breakfast prandial insulin held, pt received puree/thick for FEES testing.  Noted lunch admelog held as pt NPO at lunch time, expect BG at dinner to be elevated if pt now eating lunch without coverage.  Noted D5 1/2NS gtt started this am .Lower regimen to prevent tightly controlled BG as steroids decreased.     Uncontrolled type 2 diabetes mellitus with hyperglycemia.    Pt on prednisone taper ordered for reduction to 20mg daily to start 4/21. OR planning Friday for R AKA completion  -pt on D5 1/2NS + Diet, consider changing to different IVF If IVF is needed to prevent hyperglycemia  -test BG AC/HS  -Decrease Lantus 34 units QHS  -c/w Admelog 11-8-10 w/meals> after pt receives dinner admelog dose today lower admelog to 8 units at dinner to start tomorrow 4/21  -c/w Admelog moderate correction scale AC and mod HS scale  -on puree diet, please  add CHO   -Prednisone taper ongoing  Outpt. endo follow-up  Outpt. optho, podiatry, nephrology  Plan to d/c on basal + orals including SGLT2 given CKD. Would avoid metformin and sulfonylurea.    d/w Resident To [facility]iss    CAPILLARY BLOOD GLUCOSE      POCT Blood Glucose.: 285 mg/dL (20 Apr 2022 13:46)  POCT Blood Glucose.: 138 mg/dL (20 Apr 2022 08:00)  POCT Blood Glucose.: 152 mg/dL (19 Apr 2022 21:12)  POCT Blood Glucose.: 97 mg/dL (19 Apr 2022 17:51)

## 2022-04-20 NOTE — SWALLOW FEES ASSESSMENT ADULT - ORAL PHASE
Uncontrolled spillover of >50% of bolus from the laryngeal surface of the epiglottis Uncontrolled spillover of mild amount of material to the pyriform sinus Uncontrolled spillover to AE fold from R channel

## 2022-04-20 NOTE — PROGRESS NOTE ADULT - ASSESSMENT
86M PMH HTN, HLD, DM2 and nonhealing wounds of RLE who is non-ambulatory at home now s/p right guillotine BELOW knee amputation. Postoperative course c/b severe COPD exacerbation requiring excalation of O2 supplementation with HFNC.     - LUE duplex neg dvt  - OR for R AKA completion on Friday.   - Needs cardiology and medicine clearance.   - vac changes M/W/F  - hep gtt  - Zosyn per ID rec  - Pain control  - CC diet  - Insulin sliding scale, basal + pre-meal insulin  - Continue steroid taper, respiratory treatment  - Per PODS re: left heel discoloration- cont with z flow boot in bed at all times, leave open to air  - Appreciate cards, pulm, nephro, and medicine recs    Vascular Surgery  [id number]       86M PMH HTN, HLD, DM2 and nonhealing wounds of RLE who is non-ambulatory at home now s/p right guillotine BELOW knee amputation. Postoperative course c/b severe COPD exacerbation requiring excalation of O2 supplementation with HFNC.     - LUE duplex neg dvt  - OR for R AKA completion on Friday.   - Needs cardiology and medicine clearance.   - ID rec: cont zosyn, cleared for OR  - Per PODS re: left heel discoloration- cont with z flow boot in bed at all times, leave open to air  - Per cards: post op ok to do coumadin or eliquis AND aspirin  - vac changes M/W/F  - hep gtt  - Pain control  - CC diet  - Insulin sliding scale, basal + pre-meal insulin  - Continue steroid taper, respiratory treatment  - Appreciate cards, pulm, nephro, and medicine recs    Vascular Surgery  [id number]       86M PMH HTN, HLD, DM2 and nonhealing wounds of RLE who is non-ambulatory at home now s/p right guillotine BELOW knee amputation. Postoperative course c/b severe COPD exacerbation requiring excalation of O2 supplementation with HFNC.     - FEES today  - LUE duplex neg dvt  - OR for R AKA completion on Friday.   - Needs cardiology and medicine clearance.   - ID rec: cont zosyn, cleared for OR  - Per PODS re: left heel discoloration- cont with z flow boot in bed at all times, leave open to air  - Per cards: post op ok to do coumadin or eliquis AND aspirin  - vac changes M/W/F  - hep gtt  - Pain control  - CC diet  - Insulin sliding scale, basal + pre-meal insulin  - Continue steroid taper, respiratory treatment  - Appreciate cards, pulm, nephro, and medicine recs    Vascular Surgery  [id number]

## 2022-04-20 NOTE — PROGRESS NOTE ADULT - SUBJECTIVE AND OBJECTIVE BOX
infectious diseases progress note:    Patient is a 87y old  Male who presents with a chief complaint of Preoperative Planning for Right above knee amputation (20 Apr 2022 00:37)        Gangrene, not elsewhere classified    Peripheral vascular disease               Allergies    No Known Allergies    Intolerances        ANTIBIOTICS/RELEVANT:  antimicrobials  piperacillin/tazobactam IVPB.. 3.375 Gram(s) IV Intermittent every 8 hours    immunologic:    OTHER:  acetaminophen     Tablet .. 975 milliGRAM(s) Oral every 8 hours  albuterol/ipratropium for Nebulization 3 milliLiter(s) Nebulizer every 6 hours  atorvastatin 40 milliGRAM(s) Oral at bedtime  buDESOnide    Inhalation Suspension 0.5 milliGRAM(s) Inhalation every 12 hours  chlorhexidine 2% Cloths 1 Application(s) Topical <User Schedule>  finasteride 5 milliGRAM(s) Oral daily  furosemide   Injectable 40 milliGRAM(s) IV Push every 24 hours  gabapentin 300 milliGRAM(s) Oral every 8 hours  guaiFENesin ER 1200 milliGRAM(s) Oral every 12 hours  heparin  Infusion 1250 Unit(s)/Hr IV Continuous <Continuous>  insulin glargine Injectable (LANTUS) 37 Unit(s) SubCutaneous at bedtime  insulin lispro (ADMELOG) corrective regimen sliding scale   SubCutaneous three times a day before meals  insulin lispro (ADMELOG) corrective regimen sliding scale   SubCutaneous at bedtime  insulin lispro Injectable (ADMELOG) 13 Unit(s) SubCutaneous before breakfast  insulin lispro Injectable (ADMELOG) 10 Unit(s) SubCutaneous before lunch  insulin lispro Injectable (ADMELOG) 10 Unit(s) SubCutaneous before dinner  levothyroxine 25 MICROGram(s) Oral daily  metoprolol succinate ER 25 milliGRAM(s) Oral daily  montelukast 10 milliGRAM(s) Oral daily  multivitamin/minerals 1 Tablet(s) Oral daily  pantoprazole    Tablet 40 milliGRAM(s) Oral before breakfast  polyethylene glycol 3350 17 Gram(s) Oral daily  predniSONE   Tablet   Oral   senna 2 Tablet(s) Oral at bedtime  tamsulosin 0.8 milliGRAM(s) Oral at bedtime  traMADol 25 milliGRAM(s) Oral every 4 hours PRN  traMADol 50 milliGRAM(s) Oral every 4 hours PRN      Objective:  Vital Signs Last 24 Hrs  T(C): 36.6 (20 Apr 2022 06:39), Max: 36.9 (19 Apr 2022 10:02)  T(F): 97.9 (20 Apr 2022 06:39), Max: 98.5 (19 Apr 2022 10:02)  HR: 75 (20 Apr 2022 06:39) (59 - 75)  BP: 162/77 (20 Apr 2022 06:39) (123/63 - 167/74)  BP(mean): --  RR: 17 (20 Apr 2022 06:39) (17 - 18)  SpO2: 93% (20 Apr 2022 06:39) (91% - 96%)       Eyes:PETRONA, EOMI  Ear/Nose/Throat: no oral lesion, no sinus tenderness on percussion	  Neck:no JVD, no lymphadenopathy, supple  Respiratory: CTA leatha  Cardiovascular: S1S2 RRR, no murmurs  Gastrointestinal:soft, (+) BS, no HSM  Extremities: no cellulitis  vac over wound         LABS:                        7.8    21.90 )-----------( 279      ( 20 Apr 2022 07:33 )             26.6     04-20    143  |  107  |  79<H>  ----------------------------<  128<H>  4.2   |  22  |  1.72<H>    Ca    8.4      20 Apr 2022 07:31  Phos  4.7     04-20  Mg     2.4     04-20      PT/INR - ( 19 Apr 2022 07:26 )   PT: 12.2 sec;   INR: 1.05 ratio         PTT - ( 20 Apr 2022 00:57 )  PTT:67.9 sec        MICROBIOLOGY:    RECENT CULTURES:  04-14 @ 11:16 .Blood Blood-Peripheral                No Growth Final          RESPIRATORY CULTURES:              RADIOLOGY & ADDITIONAL STUDIES:        Pager [phone]  After 5 pm/weekends or if no response :[phone]

## 2022-04-20 NOTE — SWALLOW FEES ASSESSMENT ADULT - LARYNGEAL PENETRATION BEFORE THE SWALLOW - SILENT
Penetration before the swallow from over the AE folds (L>R) Deep penetration of >50% of bolus from the laryngeal surface of the epiglottis before the swallow

## 2022-04-20 NOTE — SWALLOW FEES ASSESSMENT ADULT - RECOMMENDED CONSISTENCY
NPO, with non-oral nutrition/hydration/medications. Recommend GOC discussion with pt/family/medical team regarding pt's wishes for long term means of nutrition/hydration/medication.    If pt were to elect to consume a PO diet despite risks of aspiration, would recommend a conservative PO diet (puree and moderately thick liquids) which may assist with reducing, though would not eliminate aspiration.

## 2022-04-20 NOTE — SWALLOW FEES ASSESSMENT ADULT - SLP GENERAL OBSERVATIONS
Pt was received at bedside awake and alert. +room air, +R BKA with wound VAC. He was AAOx3 to self, location, and reason for hospital admission. Unable to recall date (stated it was February of 2020). He was mildly confused. Able to follow basic commands with cueing. Vocal quality deemed hoarse. Exam was completed in conjunction with Lauren Caceres MS, CCC-SLP.

## 2022-04-20 NOTE — PROGRESS NOTE ADULT - ASSESSMENT
86M with PMH of COPD (not on home o2), prior smoking history (40 pack years), HTN, HLD, Afib, CHF, T2DM, CVA, BPH, and PAD admitted for elective RLE AKA. Renal consulted for CKD Mx.    KARLOS on CKD 3,   baseline Cr ~1.7  serum k stable  bicarb stable  rise likely 2/2 hemodynamic changes--> ATN  cont monitor Serum Creatinine   cont  lasix 40mg iv qd for now given lower ext edema  off losartan   monitor BMP daily and u/o   dose all meds for eGFR  avoid NSAIDs/Nephrotoxics.      Rt LE nonhealing wound:  s/p amputation  follow up with vascular  Plan AKA friday      Dr Knight  [phone]

## 2022-04-20 NOTE — PROGRESS NOTE ADULT - SUBJECTIVE AND OBJECTIVE BOX
Ritesh Bell MD  Interventional Cardiology / Endovascular Specialist  [place] Office : [phone] [place] [address] [place] [address]  Tel:   [place] Office : [address] [facility] [place] [address]  Tel: [phone]    Subjective/Overnight events: Pt is lying in bed comfortable not in distress  	  MEDICATIONS:  furosemide   Injectable 40 milliGRAM(s) IV Push every 24 hours  heparin  Infusion 1250 Unit(s)/Hr IV Continuous <Continuous>  metoprolol succinate ER 25 milliGRAM(s) Oral daily  tamsulosin 0.8 milliGRAM(s) Oral at bedtime    piperacillin/tazobactam IVPB.. 3.375 Gram(s) IV Intermittent every 8 hours    albuterol/ipratropium for Nebulization 3 milliLiter(s) Nebulizer every 6 hours  buDESOnide    Inhalation Suspension 0.5 milliGRAM(s) Inhalation every 12 hours  guaiFENesin ER 1200 milliGRAM(s) Oral every 12 hours  montelukast 10 milliGRAM(s) Oral daily    acetaminophen     Tablet .. 975 milliGRAM(s) Oral every 8 hours  gabapentin 300 milliGRAM(s) Oral every 8 hours  traMADol 25 milliGRAM(s) Oral every 4 hours PRN  traMADol 50 milliGRAM(s) Oral every 4 hours PRN    pantoprazole    Tablet 40 milliGRAM(s) Oral before breakfast  polyethylene glycol 3350 17 Gram(s) Oral daily  senna 2 Tablet(s) Oral at bedtime    atorvastatin 40 milliGRAM(s) Oral at bedtime  finasteride 5 milliGRAM(s) Oral daily  insulin glargine Injectable (LANTUS) 34 Unit(s) SubCutaneous at bedtime  insulin lispro (ADMELOG) corrective regimen sliding scale   SubCutaneous three times a day before meals  insulin lispro (ADMELOG) corrective regimen sliding scale   SubCutaneous at bedtime  insulin lispro Injectable (ADMELOG) 10 Unit(s) SubCutaneous before dinner  insulin lispro Injectable (ADMELOG) 8 Unit(s) SubCutaneous before lunch  levothyroxine 25 MICROGram(s) Oral daily  predniSONE   Tablet   Oral     chlorhexidine 2% Cloths 1 Application(s) Topical <User Schedule>  multivitamin/minerals 1 Tablet(s) Oral daily      PAST MEDICAL/SURGICAL HISTORY  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    Deep Vein Thrombosis (DVT)  17 yeras ago on Coumadin    CHF (congestive heart failure)  last exacerbation in 1/2017    Enlarged prostate    GERD (gastroesophageal reflux disease)    Hernia  umblical    Calculus of bile duct without cholangitis or cholecystitis without obstruction    Atrial fibrillation    S/P Hernia Repair    S/P cataract surgery, unspecified laterality    S/P ERCP  3/2017        SOCIAL HISTORY: Substance Use (street drugs): ( x ) never used  (  ) other:    FAMILY HISTORY:      REVIEW OF SYSTEMS:  CONSTITUTIONAL: No fever, weight loss, or fatigue  EYES: No eye pain, visual disturbances, or discharge  ENMT:  No difficulty hearing, tinnitus, vertigo; No sinus or throat pain  BREASTS: No pain, masses, or nipple discharge  GASTROINTESTINAL: No abdominal or epigastric pain. No nausea, vomiting, or hematemesis; No diarrhea or constipation. No melena or hematochezia.  GENITOURINARY: No dysuria, frequency, hematuria, or incontinence  NEUROLOGICAL: No headaches, memory loss, loss of strength, numbness, or tremors  ENDOCRINE: No heat or cold intolerance; No hair loss  MUSCULOSKELETAL: No joint pain or swelling; No muscle, back, or extremity pain  PSYCHIATRIC: No depression, anxiety, mood swings, or difficulty sleeping  HEME/LYMPH: No easy bruising, or bleeding gums  All others negative    PHYSICAL EXAM:  T(C): 36.8 (04-20-22 @ 17:04), Max: 36.8 (04-19-22 @ 18:10)  HR: 73 (04-20-22 @ 17:04) (71 - 75)  BP: 142/67 (04-20-22 @ 17:04) (142/67 - 167/74)  RR: 18 (04-20-22 @ 17:04) (17 - 18)  SpO2: 94% (04-20-22 @ 17:04) (91% - 96%)  Wt(kg): --  I&O's Summary    19 Apr 2022 07:01  -  20 Apr 2022 07:00  --------------------------------------------------------  IN: 1234 mL / OUT: 4200 mL / NET: -2966 mL    20 Apr 2022 07:01  -  20 Apr 2022 17:33  --------------------------------------------------------  IN: 120 mL / OUT: 950 mL / NET: -830 mL      EYES:   PERRLA   ENMT:   Moist mucous membranes, Good dentition, No lesions  Cardiovascular: Normal S1 S2, No JVD, No murmurs, No edema  Respiratory: b/l expiratory wheeze   Gastrointestinal:  Soft, Non-tender, + BS	  Extremities: s/p right BKA                            7.8    21.90 )-----------( 279      ( 20 Apr 2022 07:33 )             26.6     04-20    143  |  107  |  79<H>  ----------------------------<  128<H>  4.2   |  22  |  1.72<H>    Ca    8.4      20 Apr 2022 07:31  Phos  4.7     04-20  Mg     2.4     04-20      proBNP:   Lipid Profile:   HgA1c:   TSH:     Consultant(s) Notes Reviewed:  [x ] YES  [ ] NO    Care Discussed with Consultants/Other Providers [ x] YES  [ ] NO    Imaging Personally Reviewed independently:  [x] YES  [ ] NO    All labs, radiologic studies, vitals, orders and medications list reviewed. Patient is seen and examined at bedside. Case discussed with medical team.

## 2022-04-20 NOTE — CHART NOTE - NSCHARTNOTEFT_GEN_A_CORE
Per Missy CAMEJO, Vascular Surgery: "Family had goals of care discussion about swallowing difficulty, does not wish to proceed with a PEG tube at this time. They are aware of risks of aspiration, such as aspiration pneumonia or death. Will start patient on pureed diet and moderately thickened liquids." This service will f/u for dysphagia rehab.

## 2022-04-20 NOTE — PROGRESS NOTE ADULT - SUBJECTIVE AND OBJECTIVE BOX
Patient is a 87y old  Male who presents with a chief complaint of Preoperative Planning for Right above knee amputation (20 Apr 2022 17:32)      SUBJECTIVE / OVERNIGHT EVENTS: cont prednisone taper    MEDICATIONS  (STANDING):  acetaminophen     Tablet .. 975 milliGRAM(s) Oral every 8 hours  albuterol/ipratropium for Nebulization 3 milliLiter(s) Nebulizer every 6 hours  atorvastatin 40 milliGRAM(s) Oral at bedtime  buDESOnide    Inhalation Suspension 0.5 milliGRAM(s) Inhalation every 12 hours  chlorhexidine 2% Cloths 1 Application(s) Topical <User Schedule>  finasteride 5 milliGRAM(s) Oral daily  furosemide   Injectable 40 milliGRAM(s) IV Push every 24 hours  gabapentin 300 milliGRAM(s) Oral every 8 hours  guaiFENesin ER 1200 milliGRAM(s) Oral every 12 hours  heparin  Infusion 1250 Unit(s)/Hr (14.5 mL/Hr) IV Continuous <Continuous>  insulin glargine Injectable (LANTUS) 34 Unit(s) SubCutaneous at bedtime  insulin lispro (ADMELOG) corrective regimen sliding scale   SubCutaneous three times a day before meals  insulin lispro (ADMELOG) corrective regimen sliding scale   SubCutaneous at bedtime  insulin lispro Injectable (ADMELOG) 10 Unit(s) SubCutaneous before dinner  insulin lispro Injectable (ADMELOG) 8 Unit(s) SubCutaneous before lunch  levothyroxine 25 MICROGram(s) Oral daily  metoprolol succinate ER 25 milliGRAM(s) Oral daily  montelukast 10 milliGRAM(s) Oral daily  multivitamin/minerals 1 Tablet(s) Oral daily  pantoprazole    Tablet 40 milliGRAM(s) Oral before breakfast  piperacillin/tazobactam IVPB.. 3.375 Gram(s) IV Intermittent every 8 hours  polyethylene glycol 3350 17 Gram(s) Oral daily  predniSONE   Tablet   Oral   senna 2 Tablet(s) Oral at bedtime  tamsulosin 0.8 milliGRAM(s) Oral at bedtime    MEDICATIONS  (PRN):  traMADol 25 milliGRAM(s) Oral every 4 hours PRN Moderate Pain (4 - 6)  traMADol 50 milliGRAM(s) Oral every 4 hours PRN Severe Pain (7 - 10)      Vital Signs Last 24 Hrs  T(F): 98.3 (04-20-22 @ 17:04), Max: 98.3 (04-20-22 @ 10:25)  HR: 73 (04-20-22 @ 17:04) (72 - 75)  BP: 142/67 (04-20-22 @ 17:04) (142/67 - 167/74)  RR: 18 (04-20-22 @ 17:04) (17 - 18)  SpO2: 94% (04-20-22 @ 17:04) (91% - 96%)  Telemetry:   CAPILLARY BLOOD GLUCOSE      POCT Blood Glucose.: 271 mg/dL (20 Apr 2022 18:01)  POCT Blood Glucose.: 285 mg/dL (20 Apr 2022 13:46)  POCT Blood Glucose.: 138 mg/dL (20 Apr 2022 08:00)  POCT Blood Glucose.: 152 mg/dL (19 Apr 2022 21:12)    I&O's Summary    19 Apr 2022 07:01  -  20 Apr 2022 07:00  --------------------------------------------------------  IN: 1234 mL / OUT: 4200 mL / NET: -2966 mL    20 Apr 2022 07:01  -  20 Apr 2022 19:18  --------------------------------------------------------  IN: 120 mL / OUT: 950 mL / NET: -830 mL        PHYSICAL EXAM:  GENERAL: NAD, well-developed  HEAD:  Atraumatic, Normocephalic  EYES: EOMI, PERRLA, conjunctiva and sclera clear  NECK: Supple, No JVD  CHEST/LUNG: Clear to auscultation bilaterally; No wheeze  HEART: Regular rate and rhythm; No murmurs, rubs, or gallops  ABDOMEN: Soft, Nontender, Nondistended; Bowel sounds present  EXTREMITIES:  2+ Peripheral Pulses, No clubbing, cyanosis, or edema  PSYCH: AAOx3  NEUROLOGY: non-focal  SKIN: No rashes or lesions    LABS:                        7.8    21.90 )-----------( 279      ( 20 Apr 2022 07:33 )             26.6     04-20    143  |  107  |  79<H>  ----------------------------<  128<H>  4.2   |  22  |  1.72<H>    Ca    8.4      20 Apr 2022 07:31  Phos  4.7     04-20  Mg     2.4     04-20      PT/INR - ( 19 Apr 2022 07:26 )   PT: 12.2 sec;   INR: 1.05 ratio         PTT - ( 20 Apr 2022 08:58 )  PTT:74.7 sec          RADIOLOGY & ADDITIONAL TESTS:    Imaging Personally Reviewed:    Consultant(s) Notes Reviewed:      Care Discussed with Consultants/Other Providers:

## 2022-04-20 NOTE — PROGRESS NOTE ADULT - SUBJECTIVE AND OBJECTIVE BOX
LIBRA PEÑA | [id number] | 87y Male | [facility] 9MON 909 W1 | LOS 19d    Attending: Anmol Quinn    ___________________________________________________________________    CC: Patient is a 87y old  Male who presents with a chief complaint of Preoperative Planning for Right above knee amputation (2022 21:55)      SUBJECTIVE:   Patient seen today during morning rounds at bedside and found to be without acute distress. Denies chest pain, fever, severe pain, or SOB.     Overnight: Unremarkable    Allegies:  NKDA    OBJECTIVE:  Vitals:    T(C): 36.8 (22 @ 21:34), Max: 36.9 (22 @ 10:02)  HR: 73 (22 @ 21:34) (59 - 76)  BP: 167/74 (22 @ 21:34) (123/63 - 167/74)  RR: 18 (22 @ 21:34) (18 - 18)  SpO2: 96% (22 @ 21:34) (91% - 96%)      OUT:    Indwelling Catheter - Urethral (mL): 2575 mL    VAC (Vacuum Assisted Closure) System (mL): 0 mL  Total OUT: 2575 mL      OUT:    Indwelling Catheter - Urethral (mL): 3700 mL  Total OUT: 3700 mL        Physical Exam:  Gen: NAD, resting comfortably in bed  CV: Regular rate  Resp: Nonlabored breathing on room air  Ext: RLE laura JAMES stump with wound vac in place holding appropriate suction, knee immobilizer in place, erythema improved around stump site      Medications:  heparin  Infusion 1250 IV Continuous <Continuous>  piperacillin/tazobactam IVPB.. 3.375 IV Intermittent every 8 hours    acetaminophen     Tablet .. 975 milliGRAM(s) Oral every 8 hours  albuterol/ipratropium for Nebulization 3 milliLiter(s) Nebulizer every 6 hours  buDESOnide    Inhalation Suspension 0.5 milliGRAM(s) Inhalation every 12 hours  furosemide   Injectable 40 milliGRAM(s) IV Push every 24 hours  gabapentin 300 milliGRAM(s) Oral every 8 hours  guaiFENesin ER 1200 milliGRAM(s) Oral every 12 hours  metoprolol succinate ER 25 milliGRAM(s) Oral daily  montelukast 10 milliGRAM(s) Oral daily  pantoprazole    Tablet 40 milliGRAM(s) Oral before breakfast  polyethylene glycol 3350 17 Gram(s) Oral daily  senna 2 Tablet(s) Oral at bedtime  tamsulosin 0.8 milliGRAM(s) Oral at bedtime        Laboratory:  WBC: 22.85 H&H: 7.8/26.4 Plt: 298  WBC: 22.51 H&H: 8.5/27.8 Plt: 297    Chemistry:                               Phos: 4.7 M.3  144  |  107  |  82  ----------------------------<  175  4.3   |  22  |  1.66        ,                              Phos: 4.6 M.4  148  |  110  |  78  ----------------------------<  71  4.2   |  22  |  1.67          PTT 49.2 PT/INR ---/---          Reviewed laboratory and imaging     LIBRA PEÑA | [id number] | 87y Male | [facility] 9MON 909 W1 | LOS 19d    Attending: Anmol Quinn    ___________________________________________________________________    CC: Patient is a 87y old  Male who presents with a chief complaint of Preoperative Planning for Right above knee amputation (19 Apr 2022 21:55)      SUBJECTIVE:   Patient seen today during morning rounds at bedside and found to be without acute distress. Denies chest pain, fever, severe pain, or SOB.     Overnight: Unremarkable    Allegies:  NKDA    OBJECTIVE:  Vitals:    T(C): 36.8 (04-19-22 @ 21:34), Max: 36.9 (04-19-22 @ 10:02)  HR: 73 (04-19-22 @ 21:34) (59 - 76)  BP: 167/74 (04-19-22 @ 21:34) (123/63 - 167/74)  RR: 18 (04-19-22 @ 21:34) (18 - 18)  SpO2: 96% (04-19-22 @ 21:34) (91% - 96%)      OUT:    Indwelling Catheter - Urethral (mL): 2575 mL    VAC (Vacuum Assisted Closure) System (mL): 0 mL  Total OUT: 2575 mL      OUT:    Indwelling Catheter - Urethral (mL): 3700 mL  Total OUT: 3700 mL        Physical Exam:  Gen: NAD, resting comfortably in bed  CV: Regular rate  Resp: Nonlabored breathing on room air  Ext: RLE laura JAMES stump with wound vac in place holding appropriate suction, knee immobilizer in place, erythema improved around stump site      Medications:  heparin  Infusion 1250 IV Continuous <Continuous>  piperacillin/tazobactam IVPB.. 3.375 IV Intermittent every 8 hours    acetaminophen     Tablet .. 975 milliGRAM(s) Oral every 8 hours  albuterol/ipratropium for Nebulization 3 milliLiter(s) Nebulizer every 6 hours  buDESOnide    Inhalation Suspension 0.5 milliGRAM(s) Inhalation every 12 hours  furosemide   Injectable 40 milliGRAM(s) IV Push every 24 hours  gabapentin 300 milliGRAM(s) Oral every 8 hours  guaiFENesin ER 1200 milliGRAM(s) Oral every 12 hours  metoprolol succinate ER 25 milliGRAM(s) Oral daily  montelukast 10 milliGRAM(s) Oral daily  pantoprazole    Tablet 40 milliGRAM(s) Oral before breakfast  polyethylene glycol 3350 17 Gram(s) Oral daily  senna 2 Tablet(s) Oral at bedtime  tamsulosin 0.8 milliGRAM(s) Oral at bedtime        Laboratory:                          7.8    21.90 )-----------( 279      ( 20 Apr 2022 07:33 )             26.6     04-20    143  |  107  |  79<H>  ----------------------------<  128<H>  4.2   |  22  |  1.72<H>    Ca    8.4      20 Apr 2022 07:31  Phos  4.7     04-20  Mg     2.4     04-20    PT/INR - ( 19 Apr 2022 07:26 )   PT: 12.2 sec;   INR: 1.05 ratio         PTT - ( 20 Apr 2022 00:57 )  PTT:67.9 sec   LIBRA PEÑA | [id number] | 87y Male | [facility] 9MON 909 W1 | LOS 19d    Attending: Anmol Quinn    ___________________________________________________________________    CC: Patient is a 87y old  Male who presents with a chief complaint of Preoperative Planning for Right above knee amputation (19 Apr 2022 21:55)      SUBJECTIVE:   Patient seen today during morning rounds at bedside and found to be without acute distress. Denies chest pain, fever, severe pain, or SOB.  Patient had coughing/wheeze during eating and Speech&Swallow eval recommended.    Overnight: Unremarkable    Allegies:  NKDA    OBJECTIVE:  Vitals:    T(C): 36.8 (04-19-22 @ 21:34), Max: 36.9 (04-19-22 @ 10:02)  HR: 73 (04-19-22 @ 21:34) (59 - 76)  BP: 167/74 (04-19-22 @ 21:34) (123/63 - 167/74)  RR: 18 (04-19-22 @ 21:34) (18 - 18)  SpO2: 96% (04-19-22 @ 21:34) (91% - 96%)      OUT:    Indwelling Catheter - Urethral (mL): 2575 mL    VAC (Vacuum Assisted Closure) System (mL): 0 mL  Total OUT: 2575 mL      OUT:    Indwelling Catheter - Urethral (mL): 3700 mL  Total OUT: 3700 mL        Physical Exam:  Gen: NAD, resting comfortably in bed  CV: Regular rate  Resp: Nonlabored breathing on room air  Ext: RLE guillotine BKA stump with wound vac in place holding appropriate suction, knee immobilizer in place, erythema improved around stump site      Medications:  heparin  Infusion 1250 IV Continuous <Continuous>  piperacillin/tazobactam IVPB.. 3.375 IV Intermittent every 8 hours    acetaminophen     Tablet .. 975 milliGRAM(s) Oral every 8 hours  albuterol/ipratropium for Nebulization 3 milliLiter(s) Nebulizer every 6 hours  buDESOnide    Inhalation Suspension 0.5 milliGRAM(s) Inhalation every 12 hours  furosemide   Injectable 40 milliGRAM(s) IV Push every 24 hours  gabapentin 300 milliGRAM(s) Oral every 8 hours  guaiFENesin ER 1200 milliGRAM(s) Oral every 12 hours  metoprolol succinate ER 25 milliGRAM(s) Oral daily  montelukast 10 milliGRAM(s) Oral daily  pantoprazole    Tablet 40 milliGRAM(s) Oral before breakfast  polyethylene glycol 3350 17 Gram(s) Oral daily  senna 2 Tablet(s) Oral at bedtime  tamsulosin 0.8 milliGRAM(s) Oral at bedtime        Laboratory:                          7.8    21.90 )-----------( 279      ( 20 Apr 2022 07:33 )             26.6     04-20    143  |  107  |  79<H>  ----------------------------<  128<H>  4.2   |  22  |  1.72<H>    Ca    8.4      20 Apr 2022 07:31  Phos  4.7     04-20  Mg     2.4     04-20    PT/INR - ( 19 Apr 2022 07:26 )   PT: 12.2 sec;   INR: 1.05 ratio         PTT - ( 20 Apr 2022 00:57 )  PTT:67.9 sec

## 2022-04-20 NOTE — CHART NOTE - NSCHARTNOTEFT_GEN_A_CORE
Family had goals of care discussion about swallowing difficulty, does not wish to proceed with a PEG tube at this time. They are aware of risks of aspiration, such as aspiration pneumonia or death. Will start patient of pureed, moderately thickened liquids, per speech and swallow recommendations.       Missy Ariza PGY1  Vascular Surgery  [id number] Family had goals of care discussion about swallowing difficulty, does not wish to proceed with a PEG tube at this time. They are aware of risks of aspiration, such as aspiration pneumonia or death. Will start patient on pureed diet, moderately thickened liquids, per speech and swallow recommendations.       Missy Ariza PGY1  Vascular Surgery  [id number]

## 2022-04-20 NOTE — PROGRESS NOTE ADULT - ASSESSMENT
86 year old with PVD, COPD, admitted with progressive infected wounds for right BKA. Also with history of TOM (not being treated at present AF, ERCP for stones. Pt had guillotine BKA. Post op had exacerbation of COPD. Received Prednisone in the icu. Has had increasing wbc last several days  (22K)    wound had vac over it but was described as having some erythema .  Also is coughing has rhonchi and lower lobe infiltrates.    WBC could be due to steroids, pna or open wd       continue zosyn for now      follow up chest x-ray better  steroids being  tapered   no contraindication to second stage of amputaiton

## 2022-04-20 NOTE — PROGRESS NOTE ADULT - SUBJECTIVE AND OBJECTIVE BOX
Patient seen and examined  no complaints    No Known Allergies    Hospital Medications:   MEDICATIONS  (STANDING):  acetaminophen     Tablet .. 975 milliGRAM(s) Oral every 8 hours  albuterol/ipratropium for Nebulization 3 milliLiter(s) Nebulizer every 6 hours  atorvastatin 40 milliGRAM(s) Oral at bedtime  buDESOnide    Inhalation Suspension 0.5 milliGRAM(s) Inhalation every 12 hours  chlorhexidine 2% Cloths 1 Application(s) Topical <User Schedule>  finasteride 5 milliGRAM(s) Oral daily  furosemide   Injectable 40 milliGRAM(s) IV Push every 24 hours  gabapentin 300 milliGRAM(s) Oral every 8 hours  guaiFENesin ER 1200 milliGRAM(s) Oral every 12 hours  heparin  Infusion 1250 Unit(s)/Hr (14.5 mL/Hr) IV Continuous <Continuous>  insulin glargine Injectable (LANTUS) 34 Unit(s) SubCutaneous at bedtime  insulin lispro (ADMELOG) corrective regimen sliding scale   SubCutaneous three times a day before meals  insulin lispro (ADMELOG) corrective regimen sliding scale   SubCutaneous at bedtime  insulin lispro Injectable (ADMELOG) 10 Unit(s) SubCutaneous before dinner  insulin lispro Injectable (ADMELOG) 8 Unit(s) SubCutaneous before lunch  levothyroxine 25 MICROGram(s) Oral daily  metoprolol succinate ER 25 milliGRAM(s) Oral daily  montelukast 10 milliGRAM(s) Oral daily  multivitamin/minerals 1 Tablet(s) Oral daily  pantoprazole    Tablet 40 milliGRAM(s) Oral before breakfast  piperacillin/tazobactam IVPB.. 3.375 Gram(s) IV Intermittent every 8 hours  polyethylene glycol 3350 17 Gram(s) Oral daily  predniSONE   Tablet   Oral   senna 2 Tablet(s) Oral at bedtime  tamsulosin 0.8 milliGRAM(s) Oral at bedtime        VITALS:  T(F): 97.4 (22 @ 14:04), Max: 98.3 (22 @ 18:10)  HR: 75 (22 @ 14:04)  BP: 165/69 (22 @ 14:04)  RR: 18 (22 @ 14:04)  SpO2: 93% (22 @ 14:04)  Wt(kg): --     @ 07: @ 07:00  --------------------------------------------------------  IN: 1234 mL / OUT: 4200 mL / NET: -2966 mL     @ 07: @ 15:19  --------------------------------------------------------  IN: 120 mL / OUT: 550 mL / NET: -430 mL        PHYSICAL EXAM:  Constitutional: NAD  HEENT: anicteric sclera, oropharynx clear.  Neck: No JVD  Respiratory: CTAB, no wheezes, rales or rhonchi  Cardiovascular: S1, S2, RRR  Gastrointestinal: BS+, soft, NT/ND  Extremities: right BKA  Neurological: A/O x 3, no focal deficits    LABS:      143  |  107  |  79<H>  ----------------------------<  128<H>  4.2   |  22  |  1.72<H>    Ca    8.4      2022 07:31  Phos  4.7     -20  Mg     2.4     -20      Creatinine Trend: 1.72 <--, 1.66 <--, 1.67 <--, 1.75 <--, 1.62 <--, 1.87 <--, 1.82 <--, 1.59 <--, 1.75 <--                        7.8    21.90 )-----------( 279      ( 2022 07:33 )             26.6     Urine Studies:  Urinalysis Basic - ( 2022 06:05 )    Color: Light Yellow / Appearance: Clear / S.021 / pH:   Gluc:  / Ketone: Negative  / Bili: Negative / Urobili: Negative   Blood:  / Protein: Trace / Nitrite: Negative   Leuk Esterase: Moderate / RBC: 185 /hpf / WBC 12 /HPF   Sq Epi:  / Non Sq Epi: 2 /hpf / Bacteria: Negative        RADIOLOGY & ADDITIONAL STUDIES:

## 2022-04-20 NOTE — PROGRESS NOTE ADULT - ASSESSMENT
ASSESSMENT:    86 year old gentleman, former smoker, followed by Dr. Alicja Rob of our practice for asthma/COPD overlap  syndrome and obstructive sleep apnea being treated conservatively. He has no history of TOM colonization/infection as listed in the "past medical history". He has been stable from a pulmonary perspective and maintained on budesonide and duoneb once daily and if needed. He also has a history of HTN, HLD, DM, CKD, CVA, CHF (mild systolic dysfunction) and atrial fibrillation with sick sinus syndrome s/p pacemaker implantation. He has been followed for many months for chronic foot wounds and leg pain now with some purulence and gangrene. The pain is exacerbated when laying in bed and improves with tylenol and when hanging the legs off of the bed. He is no longer able to ambulate. 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 The patient has developed marked shortness of breath with severe hypoxemia currently requiring a nasal canula @ 5lpm to maintain saturation @ 90%. He has a cough with copious mucous in his chest which he is unable to expectorate. He has chest congestion and wheeze. No fevers, chills or sweats. No chest pain/pressure or palpitations. Called by the patient's family and asked to be involved with his pulmonary care.    acute hypoxic respiratory failure -> resolved    1) severe COPD exacerbation -> slowly improving but exacerbated by ongoing aspiratipm  2) 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 limiting diaphragmatic excursion and respiratory muscle weakness decreasing chest wall expansion -> bibasilar atelectasis  3) no evidence of pulmonary edema or pneumonia on the most recent CXR  4) dysphagia with dyspnea, cough and chest gurgling after eating    leukocytosis more likely related to systemic steroids than infection    steroid induced hyperglycemia    CKD/KARLOS due to diuresis in the setting of euvolemia -> improved    4/14 - remains in the ICU; continues on an insulin infusion for steroid induced hyperglycemia; worsening of his mental status and chronic left sided weakness and left facial droop after analgesics prior to the VAC change yesterday; CT scan and EEG are unremarkable; started on zosyn for possible "sepsis"; now awake and alert sitting in the chair and back to his baseline mental status; no shortness of breath or hypoxemia on room air; occasional cough productive of scant sputum; minimal chest congestion and wheeze; no fevers, chills or sweats; no chest pain/pressure or palpitations; CXR now has bibasilar atelectasis - there is no evidence of pulmonary edema; on heparin gtt for PAD    4/15 - transferred to the surgical floor; mental status is back to baseline; left facial droop and left sided weakness are no worse than usual; continues on zosyn for possible "sepsis"; awake and alert sitting in the chair; no shortness of breath or hypoxemia on room air; occasional cough productive of scant sputum; minimal chest congestion and wheeze; no fevers, chills or sweats; no chest pain/pressure or palpitations; CXR is with a small left pleural effusion and bibasilar atelectasis - there is no evidence of pulmonary edema; on heparin gtt for PAD    4/20 -  left arm swelling ->no evidence of DVT on Duplex; FEEST revealed severe dysphagia -> non oral nutrition recommended -> the family has elected to continue oral feeding understanding the risks of aspiration; mental status is back to baseline; left facial droop and left sided weakness are no worse than usual; continues on zosyn for possible "sepsis"; awake and alert sitting in the chair; no shortness of breath or hypoxemia on room air; occasional cough productive of scant sputum; mild chest congestion and wheeze especially after eating; no fevers, chills or sweats; no chest pain/pressure or palpitations; CXR reveals improved bibasilar atelectasis - there is no evidence of pulmonary edema; on heparin gtt for PAD    PLAN/RECOMMENDATIONS:    stable oxygenation on room air  EEG -> mild to moderate nonspecific diffuse or multifocal cerebral dysfunction -  no epileptiform patterns or seizures recorded.  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, stable in appearance compared with prior dated 9/8/2019  CXR 4/18 - improving bilateral lower lobe opacities (atelectasis)  started on zosyn for "sepsis" given increasing leukocytosis - blood cultures are negative - no evidence of a UTI on U/A - ID evaluation noted  prednisone 30mg daily - taper by 10mg every 2 days - glucose control on steroids has improved - off an insulin gtt  albuterol/atrovent nebs q6h  pulmicort 0.5mg nebs q12h - give after duoneb - rinse mouth after use  mucinex 1200mg 2 times daily  singulair 10mg @ bedtime  acapella device/incentive spirometer  speech and swallow evaluation noted - severe dysphagia - NPO with non-oral feeding recommended - I explained the ongoing risk of aspiration with possible pneumonia, worsening bronchospasm, hypoxemia, etc to the patient and family - they do not want placement of a NGT or PEG and wish to continue an oral diet - written for a bite sized and easy to chew diet with moderately thickened fluids - small bites - no straws - chin tuck maneuver explained and demonstated  cardiac meds: lipitor/toprol XL/lasix  flomax/proscar  vascular surgery follow-up    heparin gtt    pain control    AKA scheduled for 4/22    respiratory status continues to improve - his hypoxemia has resolved and bronchospasm has improved - at this point, there is no pulmonary contraindication to the proposed AKA  GI prophylaxis - protonix  proscar/flomax  bowel regimen  out of bed and into the chair  LUE Duplex is without DVT    Will follow with you. Plan of care discussed with the patient and his family at bedside and with Dr. Spaulding.    Sarabjit Wright MD, [facility]  [phone]  Pulmonary Medicine

## 2022-04-21 NOTE — PROGRESS NOTE ADULT - ASSESSMENT
86 year old with PVD, COPD, admitted with progressive infected wounds for right BKA. Also with history of TOM (not being treated at present AF, ERCP for stones. Pt had guillotine BKA. Post op had exacerbation of COPD. Received Prednisone in the icu. Has had increasing wbc last several days  (22K)    wound had vac over it but was described as having some erythema .  Also is coughing has rhonchi and lower lobe infiltrates.    WBC could be due to steroids, pna or open wd       continue zosyn for now      follow up chest x-ray better  steroids being  tapered   no contraindication to second stage of amputation   wbc benign followed  perhaps related to steroids

## 2022-04-21 NOTE — PROGRESS NOTE ADULT - ASSESSMENT
87 yo male ex  smoker, h/o HTN, HLD, DM, CKD, CVA, CHF (mild systolic dysfunction) and atrial fibrillation with sick sinus syndrome s/p pacemaker implantation.  h/o COPD/ZACKARY, TOM colonization/infection, admitted on 4/1 with RLE wet gangrene, s/p R guillotine BKA, and is awaiting a staged right lower extremity AKA. on heparin gtt for PAD  post op course complicated by acute hypoxic respiratory failure 2/2 COPD exacerbation. now resolved  ptn seen on 9 Jaime  Ptn is now stable on po prednisone taper, pulm following  albuterol/atrovent nebs   pulmicort 0.5mg nebs q12h   mucinex 1200mg 2 times daily  singulair 10mg @ bedtime  acapella device/incentive spirometer  on RA 91-92% pulse Ox  steroid induced hyperglycemia, now off insulin drip, on Lantus and prandial Admelog  off ABx, cleared by Pulm, card, ID for planned for staged RLE AKA. medically cleared  wbc remains elevated, prob 2/2 steroids. ID on board,   MS back at baseline  CKD3/s/p KARLOS due to diuresis in the setting of euvolemia -> improved, renal following  HTN, systolic CHF, Afib stable, cardiology following

## 2022-04-21 NOTE — PROGRESS NOTE ADULT - SUBJECTIVE AND OBJECTIVE BOX
Patient  seen and examined  no complaints    No Known Allergies    Hospital Medications:   MEDICATIONS  (STANDING):  acetaminophen     Tablet .. 975 milliGRAM(s) Oral every 8 hours  albuterol/ipratropium for Nebulization 3 milliLiter(s) Nebulizer every 6 hours  atorvastatin 40 milliGRAM(s) Oral at bedtime  buDESOnide    Inhalation Suspension 0.5 milliGRAM(s) Inhalation every 12 hours  chlorhexidine 2% Cloths 1 Application(s) Topical <User Schedule>  finasteride 5 milliGRAM(s) Oral daily  furosemide   Injectable 40 milliGRAM(s) IV Push every 24 hours  gabapentin 300 milliGRAM(s) Oral every 8 hours  guaiFENesin ER 1200 milliGRAM(s) Oral every 12 hours  heparin  Infusion 1650 Unit(s)/Hr (16.5 mL/Hr) IV Continuous <Continuous>  insulin glargine Injectable (LANTUS) 34 Unit(s) SubCutaneous at bedtime  insulin lispro (ADMELOG) corrective regimen sliding scale   SubCutaneous three times a day before meals  insulin lispro (ADMELOG) corrective regimen sliding scale   SubCutaneous at bedtime  insulin lispro Injectable (ADMELOG) 10 Unit(s) SubCutaneous before dinner  insulin lispro Injectable (ADMELOG) 8 Unit(s) SubCutaneous before lunch  insulin lispro Injectable (ADMELOG) 11 Unit(s) SubCutaneous before breakfast  levothyroxine 25 MICROGram(s) Oral daily  metoprolol succinate ER 25 milliGRAM(s) Oral daily  montelukast 10 milliGRAM(s) Oral daily  multivitamin/minerals 1 Tablet(s) Oral daily  pantoprazole    Tablet 40 milliGRAM(s) Oral before breakfast  polyethylene glycol 3350 17 Gram(s) Oral daily  predniSONE   Tablet   Oral   predniSONE   Tablet 20 milliGRAM(s) Oral daily  senna 2 Tablet(s) Oral at bedtime  tamsulosin 0.8 milliGRAM(s) Oral at bedtime        VITALS:  T(F): 97.5 (04-21-22 @ 05:27), Max: 98.3 (04-20-22 @ 10:25)  HR: 66 (04-21-22 @ 05:27)  BP: 126/67 (04-21-22 @ 05:27)  RR: 18 (04-21-22 @ 05:27)  SpO2: 94% (04-21-22 @ 05:27)  Wt(kg): --    04-20 @ 07:01  -  04-21 @ 07:00  --------------------------------------------------------  IN: 694 mL / OUT: 2650 mL / NET: -1956 mL    PHYSICAL EXAM:  Constitutional: NAD  HEENT: anicteric sclera, oropharynx clear.  Neck: No JVD  Respiratory: CTAB, no wheezes, rales or rhonchi  Cardiovascular: S1, S2, RRR  Gastrointestinal: BS+, soft, NT/ND  Extremities: right BKA  Neurological: A/O x 3, no focal deficits    LABS:  04-21    146<H>  |  109<H>  |  70<H>  ----------------------------<  59<L>  4.3   |  22  |  1.47<H>    Ca    8.6      21 Apr 2022 07:09  Phos  4.2     04-21  Mg     2.3     04-21      Creatinine Trend: 1.47 <--, 1.72 <--, 1.66 <--, 1.67 <--, 1.75 <--, 1.62 <--, 1.87 <--, 1.82 <--, 1.59 <--                        8.1    22.61 )-----------( 277      ( 21 Apr 2022 07:07 )             27.2     Urine Studies:      RADIOLOGY & ADDITIONAL STUDIES:

## 2022-04-21 NOTE — PROGRESS NOTE ADULT - SUBJECTIVE AND OBJECTIVE BOX
Patient is a 87y old  Male who presents with a chief complaint of Preoperative Planning for Right above knee amputation (21 Apr 2022 18:45)      SUBJECTIVE / OVERNIGHT EVENTS: cleared by card and pulm for AKA    MEDICATIONS  (STANDING):  acetaminophen     Tablet .. 975 milliGRAM(s) Oral every 8 hours  albuterol/ipratropium for Nebulization 3 milliLiter(s) Nebulizer every 6 hours  atorvastatin 40 milliGRAM(s) Oral at bedtime  buDESOnide    Inhalation Suspension 0.5 milliGRAM(s) Inhalation every 12 hours  chlorhexidine 2% Cloths 1 Application(s) Topical <User Schedule>  finasteride 5 milliGRAM(s) Oral daily  furosemide   Injectable 40 milliGRAM(s) IV Push every 24 hours  gabapentin 300 milliGRAM(s) Oral every 8 hours  guaiFENesin ER 1200 milliGRAM(s) Oral every 12 hours  heparin  Infusion 1450 Unit(s)/Hr (14.5 mL/Hr) IV Continuous <Continuous>  insulin glargine Injectable (LANTUS) 23 Unit(s) SubCutaneous at bedtime  insulin lispro (ADMELOG) corrective regimen sliding scale   SubCutaneous three times a day before meals  insulin lispro (ADMELOG) corrective regimen sliding scale   SubCutaneous at bedtime  insulin lispro Injectable (ADMELOG) 5 Unit(s) SubCutaneous before lunch  insulin lispro Injectable (ADMELOG) 5 Unit(s) SubCutaneous before dinner  levothyroxine 25 MICROGram(s) Oral daily  metoprolol succinate ER 25 milliGRAM(s) Oral daily  montelukast 10 milliGRAM(s) Oral daily  multivitamin/minerals 1 Tablet(s) Oral daily  pantoprazole    Tablet 40 milliGRAM(s) Oral before breakfast  polyethylene glycol 3350 17 Gram(s) Oral daily  predniSONE   Tablet   Oral   predniSONE   Tablet 20 milliGRAM(s) Oral daily  senna 2 Tablet(s) Oral at bedtime  tamsulosin 0.8 milliGRAM(s) Oral at bedtime    MEDICATIONS  (PRN):  traMADol 25 milliGRAM(s) Oral every 4 hours PRN Moderate Pain (4 - 6)  traMADol 50 milliGRAM(s) Oral every 4 hours PRN Severe Pain (7 - 10)      Vital Signs Last 24 Hrs  T(F): 97.6 (04-21-22 @ 17:10), Max: 97.9 (04-21-22 @ 10:01)  HR: 64 (04-21-22 @ 17:10) (64 - 79)  BP: 139/67 (04-21-22 @ 17:10) (126/67 - 149/69)  RR: 18 (04-21-22 @ 17:10) (17 - 18)  SpO2: 96% (04-21-22 @ 17:10) (92% - 96%)  Telemetry:   CAPILLARY BLOOD GLUCOSE      POCT Blood Glucose.: 204 mg/dL (21 Apr 2022 18:03)  POCT Blood Glucose.: 123 mg/dL (21 Apr 2022 13:34)  POCT Blood Glucose.: 122 mg/dL (21 Apr 2022 10:26)  POCT Blood Glucose.: 73 mg/dL (21 Apr 2022 08:17)  POCT Blood Glucose.: 196 mg/dL (20 Apr 2022 21:13)    I&O's Summary    20 Apr 2022 07:01  -  21 Apr 2022 07:00  --------------------------------------------------------  IN: 694 mL / OUT: 2650 mL / NET: -1956 mL    21 Apr 2022 07:01  -  21 Apr 2022 20:00  --------------------------------------------------------  IN: 561 mL / OUT: 1400 mL / NET: -839 mL        PHYSICAL EXAM:  GENERAL: NAD, well-developed  HEAD:  Atraumatic, Normocephalic  EYES: EOMI, PERRLA, conjunctiva and sclera clear  NECK: Supple, No JVD  CHEST/LUNG: Clear to auscultation bilaterally; No wheeze  HEART: Regular rate and rhythm; No murmurs, rubs, or gallops  ABDOMEN: Soft, Nontender, Nondistended; Bowel sounds present  EXTREMITIES:  2+ Peripheral Pulses, No clubbing, cyanosis, or edema  PSYCH: AAOx3  NEUROLOGY: non-focal  SKIN: No rashes or lesions    LABS:                        8.1    22.61 )-----------( 277      ( 21 Apr 2022 07:07 )             27.2     04-21    146<H>  |  109<H>  |  70<H>  ----------------------------<  59<L>  4.3   |  22  |  1.47<H>    Ca    8.6      21 Apr 2022 07:09  Phos  4.2     04-21  Mg     2.3     04-21      PTT - ( 21 Apr 2022 15:10 )  PTT:100.8 sec          RADIOLOGY & ADDITIONAL TESTS:    Imaging Personally Reviewed:    Consultant(s) Notes Reviewed:      Care Discussed with Consultants/Other Providers:

## 2022-04-21 NOTE — PROGRESS NOTE ADULT - SUBJECTIVE AND OBJECTIVE BOX
DIABETES FOLLOW UP : Seen earlier today    INTERVAL HX: noted pt receiving synthroid at same time at protonix , d/w RN and team to change timing of sythroid to 1 hour prior to all meds team aware. pt reports RLE pain  RN made aware . FBG tightly controlled this am 73 prandial insulin held this am although received prednisone 20mg today , npo p mn for AKA completion tomorrow . tolerating puree diet       Review of Systems:  General: As above  Cardiovascular: No chest pain  Respiratory: No SOB  GI: no nausea no vomiting   Endocrine: no  S&Sx of hypoglycemia    Allergies    No Known Allergies    Intolerances      MEDICATIONS  (STANDING):  acetaminophen     Tablet .. 975 milliGRAM(s) Oral every 8 hours  albuterol/ipratropium for Nebulization 3 milliLiter(s) Nebulizer every 6 hours  atorvastatin 40 milliGRAM(s) Oral at bedtime  buDESOnide    Inhalation Suspension 0.5 milliGRAM(s) Inhalation every 12 hours  chlorhexidine 2% Cloths 1 Application(s) Topical <User Schedule>  finasteride 5 milliGRAM(s) Oral daily  furosemide   Injectable 40 milliGRAM(s) IV Push every 24 hours  gabapentin 300 milliGRAM(s) Oral every 8 hours  guaiFENesin ER 1200 milliGRAM(s) Oral every 12 hours  heparin  Infusion 1650 Unit(s)/Hr (16.5 mL/Hr) IV Continuous <Continuous>  insulin glargine Injectable (LANTUS) 26 Unit(s) SubCutaneous at bedtime  insulin lispro (ADMELOG) corrective regimen sliding scale   SubCutaneous three times a day before meals  insulin lispro (ADMELOG) corrective regimen sliding scale   SubCutaneous at bedtime  insulin lispro Injectable (ADMELOG) 8 Unit(s) SubCutaneous before dinner  insulin lispro Injectable (ADMELOG) 8 Unit(s) SubCutaneous before lunch  insulin lispro Injectable (ADMELOG) 11 Unit(s) SubCutaneous before breakfast  levothyroxine 25 MICROGram(s) Oral daily  metoprolol succinate ER 25 milliGRAM(s) Oral daily  montelukast 10 milliGRAM(s) Oral daily  multivitamin/minerals 1 Tablet(s) Oral daily  pantoprazole    Tablet 40 milliGRAM(s) Oral before breakfast  polyethylene glycol 3350 17 Gram(s) Oral daily  predniSONE   Tablet   Oral   predniSONE   Tablet 20 milliGRAM(s) Oral daily  senna 2 Tablet(s) Oral at bedtime  tamsulosin 0.8 milliGRAM(s) Oral at bedtime      PHYSICAL EXAM:  VITALS: T(C): 36.6 (04-21-22 @ 10:01)  T(F): 97.9 (04-21-22 @ 10:01), Max: 98.3 (04-20-22 @ 17:04)  HR: 66 (04-21-22 @ 10:01) (66 - 79)  BP: 135/74 (04-21-22 @ 10:01) (126/67 - 165/69)  RR:  (17 - 18)  SpO2:  (92% - 95%)  Wt(kg): --  GENERAL: male laying in bed  in NAD  Abdomen: Soft, nontender, non distended  Extremities: Warm,R AKA  NEURO: A&O X3    LABS:  POCT Blood Glucose.: 122 mg/dL (04-21-22 @ 10:26)  POCT Blood Glucose.: 73 mg/dL (04-21-22 @ 08:17)  POCT Blood Glucose.: 196 mg/dL (04-20-22 @ 21:13)  POCT Blood Glucose.: 271 mg/dL (04-20-22 @ 18:01)  POCT Blood Glucose.: 285 mg/dL (04-20-22 @ 13:46)  POCT Blood Glucose.: 138 mg/dL (04-20-22 @ 08:00)  POCT Blood Glucose.: 152 mg/dL (04-19-22 @ 21:12)  POCT Blood Glucose.: 97 mg/dL (04-19-22 @ 17:51)  POCT Blood Glucose.: 131 mg/dL (04-19-22 @ 13:03)  POCT Blood Glucose.: 190 mg/dL (04-19-22 @ 07:44)  POCT Blood Glucose.: 205 mg/dL (04-18-22 @ 22:13)  POCT Blood Glucose.: 142 mg/dL (04-18-22 @ 17:20)                            8.1    22.61 )-----------( 277      ( 21 Apr 2022 07:07 )             27.2       04-21    146<H>  |  109<H>  |  70<H>  ----------------------------<  59<L>  4.3   |  22  |  1.47<H>    Ca    8.6      21 Apr 2022 07:09  Phos  4.2     04-21  Mg     2.3     04-21        eGFR: 46 mL/min/1.73m2 (21 Apr 2022 07:09)          Thyroid Function Tests:          A1C with Estimated Average Glucose Result: 6.8 % (04-02-22 @ 12:21)      Estimated Average Glucose: 148 mg/dL (04-02-22 @ 12:21)                         DIABETES FOLLOW UP : Seen earlier today    INTERVAL HX: noted pt receiving synthroid at same time at [facility] , d/w RN and team to change timing of sythroid to 1 hour prior to all meds team aware. pt reports RLE pain  RN made aware . FBG tightly controlled this am 73 prandial insulin held this am although received prednisone 20mg today with lunch BG at goal , pt tolerating puree diet  , npo p mn for AKA completion tomorrow .      Review of Systems:  General: As above  Cardiovascular: No chest pain  Respiratory: No SOB  GI: no nausea no vomiting   Endocrine: no  S&Sx of hypoglycemia    Allergies    No Known Allergies    Intolerances      MEDICATIONS  (STANDING):  acetaminophen     Tablet .. 975 milliGRAM(s) Oral every 8 hours  albuterol/ipratropium for Nebulization 3 milliLiter(s) Nebulizer every 6 hours  atorvastatin 40 milliGRAM(s) Oral at bedtime  buDESOnide    Inhalation Suspension 0.5 milliGRAM(s) Inhalation every 12 hours  chlorhexidine 2% Cloths 1 Application(s) Topical <User Schedule>  finasteride 5 milliGRAM(s) Oral daily  furosemide   Injectable 40 milliGRAM(s) IV Push every 24 hours  gabapentin 300 milliGRAM(s) Oral every 8 hours  guaiFENesin ER 1200 milliGRAM(s) Oral every 12 hours  heparin  Infusion 1650 Unit(s)/Hr (16.5 mL/Hr) IV Continuous <Continuous>  insulin glargine Injectable (LANTUS) 26 Unit(s) SubCutaneous at bedtime  insulin lispro (ADMELOG) corrective regimen sliding scale   SubCutaneous three times a day before meals  insulin lispro (ADMELOG) corrective regimen sliding scale   SubCutaneous at bedtime  insulin lispro Injectable (ADMELOG) 8 Unit(s) SubCutaneous before dinner  insulin lispro Injectable (ADMELOG) 8 Unit(s) SubCutaneous before lunch  insulin lispro Injectable (ADMELOG) 11 Unit(s) SubCutaneous before breakfast  levothyroxine 25 MICROGram(s) Oral daily  metoprolol succinate ER 25 milliGRAM(s) Oral daily  montelukast 10 milliGRAM(s) Oral daily  multivitamin/minerals 1 Tablet(s) Oral daily  pantoprazole    Tablet 40 milliGRAM(s) Oral before breakfast  polyethylene glycol 3350 17 Gram(s) Oral daily  predniSONE   Tablet   Oral   predniSONE   Tablet 20 milliGRAM(s) Oral daily  senna 2 Tablet(s) Oral at bedtime  tamsulosin 0.8 milliGRAM(s) Oral at bedtime      PHYSICAL EXAM:  VITALS: T(C): 36.6 (04-21-22 @ 10:01)  T(F): 97.9 (04-21-22 @ 10:01), Max: 98.3 (04-20-22 @ 17:04)  HR: 66 (04-21-22 @ 10:01) (66 - 79)  BP: 135/74 (04-21-22 @ 10:01) (126/67 - 165/69)  RR:  (17 - 18)  SpO2:  (92% - 95%)  Wt(kg): --  GENERAL: male laying in bed  in NAD  Abdomen: Soft, nontender, non distended  Extremities: Warm,R AKA  NEURO: A&O X3    LABS:  POCT Blood Glucose.: 122 mg/dL (04-21-22 @ 10:26)  POCT Blood Glucose.: 73 mg/dL (04-21-22 @ 08:17)  POCT Blood Glucose.: 196 mg/dL (04-20-22 @ 21:13)  POCT Blood Glucose.: 271 mg/dL (04-20-22 @ 18:01)  POCT Blood Glucose.: 285 mg/dL (04-20-22 @ 13:46)  POCT Blood Glucose.: 138 mg/dL (04-20-22 @ 08:00)  POCT Blood Glucose.: 152 mg/dL (04-19-22 @ 21:12)  POCT Blood Glucose.: 97 mg/dL (04-19-22 @ 17:51)  POCT Blood Glucose.: 131 mg/dL (04-19-22 @ 13:03)  POCT Blood Glucose.: 190 mg/dL (04-19-22 @ 07:44)  POCT Blood Glucose.: 205 mg/dL (04-18-22 @ 22:13)  POCT Blood Glucose.: 142 mg/dL (04-18-22 @ 17:20)                            8.1    22.61 )-----------( 277      ( 21 Apr 2022 07:07 )             27.2       04-21    146<H>  |  109<H>  |  70<H>  ----------------------------<  59<L>  4.3   |  22  |  1.47<H>    Ca    8.6      21 Apr 2022 07:09  Phos  4.2     04-21  Mg     2.3     04-21        eGFR: 46 mL/min/1.73m2 (21 Apr 2022 07:09)          Thyroid Function Tests:          A1C with Estimated Average Glucose Result: 6.8 % (04-02-22 @ 12:21)      Estimated Average Glucose: 148 mg/dL (04-02-22 @ 12:21)

## 2022-04-21 NOTE — PROGRESS NOTE ADULT - SUBJECTIVE AND OBJECTIVE BOX
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    INTERVAL HISTORY: decreased left arm swelling ->no evidence of DVT on Duplex; FEEST revealed severe dysphagia -> non oral nutrition recommended -> the family has elected to continue oral feeding understanding the risks of aspiration (small bites and easy to chew diet with moderately thickened fluids ordered); mental status is back to baseline; left facial droop and left sided weakness are no worse than usual; continues on zosyn for possible "sepsis"; awake and alert sitting in the chair; no shortness of breath or hypoxemia on room air; occasional cough productive of scant sputum; mild chest congestion and wheeze especially after eating; no fevers, chills or sweats; no chest pain/pressure or palpitations; CXR reveals improved bibasilar atelectasis - there is no evidence of pulmonary edema; on heparin gtt for PAD    REVIEW OF SYSTEMS:  Constitutional: As per interval history  HEENT: Within normal limits  CV: As per interval history  Resp: As per interval history  GI: dysphagia  : KARLOS -> resolved  Musculoskeletal: s/p right BKA  Skin: Within normal limits  Neurological: Within normal limits  Psychiatric: Within normal limits  Endocrine: hyperglycemia  Hematologic/Lymphatic: Within normal limits  Allergic/Immunologic: Within normal limits    MEDICATIONS:     Pulmonary "  albuterol/ipratropium for Nebulization 3 milliLiter(s) Nebulizer every 6 hours  buDESOnide    Inhalation Suspension 0.5 milliGRAM(s) Inhalation every 12 hours  guaiFENesin ER 1200 milliGRAM(s) Oral every 12 hours  montelukast 10 milliGRAM(s) Oral daily    Anti-microbials:    Cardiovascular:  furosemide   Injectable 40 milliGRAM(s) IV Push every 24 hours  metoprolol succinate ER 25 milliGRAM(s) Oral daily  tamsulosin 0.8 milliGRAM(s) Oral at bedtime    Other:  acetaminophen     Tablet .. 975 milliGRAM(s) Oral every 8 hours  atorvastatin 40 milliGRAM(s) Oral at bedtime  chlorhexidine 2% Cloths 1 Application(s) Topical <User Schedule>  finasteride 5 milliGRAM(s) Oral daily  gabapentin 300 milliGRAM(s) Oral every 8 hours  heparin  Infusion 1450 Unit(s)/Hr IV Continuous <Continuous>  insulin glargine Injectable (LANTUS) 23 Unit(s) SubCutaneous at bedtime  insulin lispro (ADMELOG) corrective regimen sliding scale   SubCutaneous three times a day before meals  insulin lispro (ADMELOG) corrective regimen sliding scale   SubCutaneous at bedtime  insulin lispro Injectable (ADMELOG) 5 Unit(s) SubCutaneous before lunch  insulin lispro Injectable (ADMELOG) 5 Unit(s) SubCutaneous before dinner  levothyroxine 25 MICROGram(s) Oral daily  multivitamin/minerals 1 Tablet(s) Oral daily  pantoprazole    Tablet 40 milliGRAM(s) Oral before breakfast  polyethylene glycol 3350 17 Gram(s) Oral daily  predniSONE   Tablet   Oral   predniSONE   Tablet 20 milliGRAM(s) Oral daily  senna 2 Tablet(s) Oral at bedtime    MEDICATIONS  (PRN):  traMADol 25 milliGRAM(s) Oral every 4 hours PRN Moderate Pain (4 - 6)  traMADol 50 milliGRAM(s) Oral every 4 hours PRN Severe Pain (7 - 10)    OBJECTIVE:    POCT Blood Glucose.: 204 mg/dL (2022 18:03)  POCT Blood Glucose.: 123 mg/dL (2022 13:34)  POCT Blood Glucose.: 122 mg/dL (2022 10:26)  POCT Blood Glucose.: 73 mg/dL (2022 08:17)  POCT Blood Glucose.: 196 mg/dL (2022 21:13)      PHYSICAL EXAM:       ICU Vital Signs Last 24 Hrs  T(C): 36.4 (2022 17:10), Max: 36.6 (2022 10:01)  T(F): 97.6 (2022 17:10), Max: 97.9 (2022 10:01)  HR: 64 (2022 17:10) (64 - 79)  BP: 139/67 (2022 17:10) (126/67 - 149/69)  BP(mean): --  ABP: --  ABP(mean): --  RR: 18 (2022 17:10) (17 - 18)  SpO2: 96% (2022 17:10) (92% - 96%) on room air     General: Awake. Alert. Cooperative. Severe cough after eating thickened fluids. Appears stated age. Obese. Sitting in the chair  HEENT: Atraumatic. Normocephalic. Anicteric. Normal oral mucosa. PERRL. EOMI.  Neck: Supple. Trachea midline. Thyroid without enlargement/tenderness/nodules. No carotid bruit. No JVD.	  Cardiovascular: Regular rate and rhythm. Distant S1 S2. No murmurs, rubs or gallops.  Respiratory: Respirations unlabored. Mild bilateral rhonchi and wheeze. No curvature.  Abdomen: Soft. Non-tender. Non-distended. No organomegaly. No masses. Normal bowel sounds.  Extremities: Warm to touch. No clubbing or cyanosis. No pedal edema. Right BKA with VAC dressing. Swelling and pitting edema of the left upper extremity  Pulses: Decreased peripheral pulses LLE  Skin: Venous stasis changes left lower extremity  Lymph Nodes: Cervical, supraclavicular and axillary nodes normal  Neurological: Left sided weakness left > arm. Left facial droop. A and O x 3  Psychiatry: Appropriate mood and affect.    LABS:                          8.1    22.61 )-----------( 277      ( 2022 07:07 )             27.2     CBC    WBC  22.61 <==, 21.90 <==, 22.85 <==, 22.51 <==, 19.99 <==, 17.75 <==, 20.97 <==    Hemoglobin  8.1 <<==, 7.8 <<==, 7.8 <<==, 8.5 <<==, 8.4 <<==, 8.4 <<==, 7.9 <<==    Hematocrit  27.2 <==, 26.6 <==, 26.4 <==, 27.8 <==, 28.5 <==, 29.0 <==, 25.4 <==    Platelets  277 <==, 279 <==, 298 <==, 297 <==, 302 <==, 287 <==, 320 <==      146<H>  |  109<H>  |  70<H>  ----------------------------<  59<L>    04-21  4.3   |  22  |  1.47<H>      LYTES    sodium  146 <==, 143 <==, 144 <==, 148 <==, 149 <==, 149 <==, 145 <==    potassium   4.3 <==, 4.2 <==, 4.3 <==, 4.2 <==, 3.9 <==, 4.1 <==, 5.0 <==    chloride  109 <==, 107 <==, 107 <==, 110 <==, 112 <==, 112 <==, 112 <==    carbon dioxide  22 <==, 22 <==, 22 <==, 22 <==, 20 <==, 20 <==, 19 <==    =============================================================================================  RENAL FUNCTION:    Creatinine:   1.47  <<==, 1.72  <<==, 1.66  <<==, 1.67  <<==, 1.75  <<==, 1.62  <<==, 1.87  <<==    BUN:   70 <==, 79 <==, 82 <==, 78 <==, 86 <==, 91 <==, 96 <==    ============================================================================================    calcium   8.6 <==, 8.4 <==, 8.6 <==, 8.7 <==, 8.7 <==, 8.8 <==, 9.1 <==    phos   4.2 <==, 4.7 <==, 4.7 <==, 4.6 <==, 4.9 <==, 5.0 <==, 4.4 <==    mag   2.3 <==, 2.4 <==, 2.3 <==, 2.4 <==, 2.5 <==, 2.6 <==, 2.6 <==    ============================================================================================  PT/INR - ( 2022 07:26 )   PT: 12.2 sec;   INR: 1.05 ratio       PTT - ( 2022 15:10 )  PTT:100.8 sec    ABG - ( 2022 18:43 )  pH: 7.43  /  pCO2: 35    /  pO2: 80    / HCO3: 23    / Base Excess: -0.8  /  SaO2: 99.0      Venous Blood Gas:   @ 22:10  7.40/41/52/25/84.4  VBG Lactate: 1.8    Venous Blood Gas:   @ 22:23  7.40/39/45/24/78.1  VBG Lactate: 2.4    Venous Blood Gas:   @ 22:23  7.40/39/45/24/78.1  VBG Lactate: 2.4    Venous Blood Gas:  04-10 @ 23:19  7.39/42/44/25/72.2  VBG Lactate: 2.5    < from: TTE with Doppler (w/Cont) (22 @ 15:07) >    Patient name: Martir Heart  YOB: 1935   Age: 86 (M)   MR#: [id number]  Study Date: 4/3/2022  Location: [address] [place], [place] [id number]: Angie Olivarez RDCS  Study quality: Technically difficult  Referring Physician: Anmol Quinn MD  BloodPressure: 115/70 mmHg  Height: 173 cm  Weight: 92 kg  BSA: 2.1 m2  ------------------------------------------------------------------------  PROCEDURE: Transthoracic echocardiogram with 2-D, M-Mode  and complete spectral and color flow Doppler. Verbal  consent was obtained for injection of  Ultrasonic Enhancing  Agent following a discussion of risks and benefits.  Following intravenous injection of Ultrasonic Enhancing  Agent, harmonic imaging was performed.  INDICATION: Cardiomyopathy, unspecified (I42.9)  ------------------------------------------------------------------------  Dimensions:    Normal Values:  LA:     3.1    2.0 - 4.0 cm  Ao:     3.5    2.0 - 3.8 cm  SEPTUM: 1.1    0.6 - 1.2 cm  PWT:    1.3    0.6 - 1.1 cm  LVIDd:  4.3    3.0- 5.6 cm  LVIDs:  3.2    1.8 - 4.0 cm  Derived variables:  LVMI: 90 g/m2  RWT: 0.60  Fractional short: 26 %  EF (Visual Estimate): NWV %  Doppler Peak Velocity (m/sec): MV=1.6 AoV=1.4  ------------------------------------------------------------------------  Conclusions:  1. Aortic valve not well visualized; appears calcified.  Peak transaortic valve gradient equals 8 mm Hg, mean  transaortic valve gradient equals 3 mm Hg, estimated aortic  valve area equals 2 sqcm (by continuity equation), aortic  valve velocity time integral equals 22 cm, consistent with  mild aortic stenosis.  2. Endocardial visualization enhanced with intravenous  injection of Ultrasonic Enhancing Agent (Definity). Mild  left ventricular systolic dysfunction. The inferior wall,  and the inferoseptum are hypokinetic.  3. The right ventricle is not well visualized; grossly  normal right ventricular systolic function.  ------------------------------------------------------------------------  Confirmed on  4/3/2022 - 17:12:14 by BRITTANY Giraldo  ------------------------------------------------------------------------  -----------------------------------------------------------  MICROBIOLOGY:     COVID-19 PCR . (22 @ 18:23)   COVID-19 PCR: NotDetec:     Urinalysis Basic - ( 2022 06:05 )    Color: Light Yellow / Appearance: Clear / S.021 / pH: x  Gluc: x / Ketone: Negative  / Bili: Negative / Urobili: Negative   Blood: x / Protein: Trace / Nitrite: Negative   Leuk Esterase: Moderate / RBC: 185 /hpf / WBC 12 /HPF   Sq Epi: x / Non Sq Epi: 2 /hpf / Bacteria: Negative    Culture - Blood (22 @ 11:16)   Specimen Source: .Blood Blood-Peripheral   Culture Results:   No growth to date.     Culture - Blood (22 @ 11:16)   Specimen Source: .Blood Blood-Peripheral   Culture Results:   No growth to date.     RADIOLOGY:  [x] Chest radiographs reviewed and interpreted by me    EXAM:  DUPLEX EXT VEINS UPPER LT                          PROCEDURE DATE:  2022      IMPRESSION:  No evidence of left upper extremity deep venous thrombosis.    NARCISO MUÑOZ MD; Attending Radiologist  This document has been electronically signed. 2022  8:48PM  ---------------------------------------------------------------------------------------------------------------  EXAM:  XR CHEST PORTABLE URGENT 1V                          PROCEDURE DATE:  2022      FINDINGS:  Left chest wall pacemaker.  The heart size cannot be accurately evaluated on this projection.  Bilateral lower lung hazy opacities, reduced since 2022.  There is no pneumothorax.    IMPRESSION:  Partial clearing of the bases and left effusion.    ANITA INFANTE MD; Resident Radiologist  This document has been electronically signed.  HORACIO HELMS MD; Attending Interventional Radiologist  Thisdocument has been electronically signed. 2022  3:57PM  --------------------------------------------------------------------------------------------------------------  EXAM:  CT BRAIN                          PROCEDURE DATE:  2022      FINDINGS:    Prominence of ventricles and subarachnoid spaces, compatible with   atrophy. Periventricular small vessel white matter ischemic changes.   Encephalomalacia and gliosis is appreciated bilaterally compatible with   old regions of infarction, noted in a high left posterior frontal   location, right posterior parietal/occipital location, and right   cerebellar location. Old ischemic event is also appreciated along the   anterior limb of the internal capsule on the left and along the external   capsular region on the right.    There is prominence of the bifrontal extra-axial regions, suggestive of   bifrontal subdural hygromas, more prominent on the left than the   right-stable compared with prior. Prominent deposition of calcified   plaque within the bilateral carotid siphons, as on prior.    Deposition of calcium appreciated within the bilateral basal ganglia, as   on prior.    No acute intracranial hemorrhage. No intraparenchymal mass lesion, mass   effect, or midline shift.    Appearance of the bilateral optic lenses suggests the patient has   previously undergone prior cataract surgery.    Imaged paranasal sinuses, bilateral mastoid air cells, middle ear   cavities are clear.    IMPRESSION:  1. No acute intracranial hemorrhage.    2. 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 No evidence of an acute ischemic event.    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    DAWN BEHR-VENTURA MD; Attending Radiologist  This document has been electronically signed. 2022  8:25PM  ---------------------------------------------------------------------------------------------------------------   EXAM:  XR CHEST PORTABLE ROUTINE 1V                          PROCEDURE DATE:  2022      FINDINGS:    Support devices: A pacemaker overlies the left chest wall with its leads   intact.    Cardiac/mediastinum/hilum: Heart size cannot be accurately assessed on   this projection.    Lung parenchyma/Pleura: Right lower lung hazy opacities. Bibasilar   atelectasis. Trace left pleural effusion, unchanged. There is no   pneumothorax.    Skeleton/soft tissues: No acute osseous abnormalities.    IMPRESSION:    Right lower lung hazy opacities, which may represent atelectasis versus   infection.    Unchanged trace left pleural effusion.    EDITH HER MD; Resident Radiologist  This document has been electronically signed.  EVON MAN MD; Attending Radiologist  This document has been electronically signed. 2022  5:11PM  ---------------------------------------------------------------------------------------------------------------  EXAM:  XR CHEST PORTABLE ROUTINE 1V                          PROCEDURE DATE:  04/10/2022      FINDINGS:  Left pacemaker with leads in the right atrium and ventricle.    The heart size is not accurately assessed on this projection.  Bilateral lower lung field patchy opacities, left greater than right.  There is no pneumothorax. Trace left pleural effusion.    IMPRESSION:  Bilateral patchy opacities, left greater than right, differential   includes atelectasis or infection.    JAYDON MONTEMAYOR MD; Resident Radiologist  This document has been electronically signed.  SATURNINO CHERRY MD; Attending Radiologist  This document has been electronically signed. Apr 10 2022  9:14AM  --------------------------------------------------------------------------------------------  EXAM:  XR CHEST PORTABLE URGENT 1V                          PROCEDURE DATE:  2022      FINDINGS:  Left chest wall dual-lead pacemaker. Rotated exam limits assessment for   lead tip.    Small left pleural effusion with adjacent opacity. No pneumothorax.    Cardiac size cannot accurately be assessed in this projection.  Aortic   calcifications.      IMPRESSION: Small left pleural effusion with adjacent atelectasis   obscuring evaluation of the underlying lung.    DRE SANCHEZ MD; Resident Radiology  This document has been electronically signed.  WAGNER LAM MD; Attending Radiologist  This document has been electronically signed. 2022  5:54PM  ---------------------------------------------------------------------------------------------------------------  EXAM:  XR CHEST PORTABLE URGENT 1V                          PROCEDURE DATE:  2022      FINDINGS:  Left chest wall dual-lead pacemaker.  The heart is normal in size.  The lungs are clear.  There is no pneumothorax or pleural effusion.    IMPRESSION:  Clear lungs.    KENA CARRINGTON MD; Resident Radiologist  This document has been electronically signed.  SATURNINO CHERRY MD; Attending Radiologist  This document has been electronically signed. 2022 11:40AM  ---------------------------------------------------------------------------------------------------------------  EXAM:  XR CHEST PORTABLE URGENT 1V                          PROCEDURE DATE:  2022      FINDINGS:    Appropriate course of dual-chamber pacemaker. Unremarkable   cardiomediastinal silhouette. Minimal left basilar atelectasis. No   pleural effusion or pneumothorax.      IMPRESSION:    Mild left basilar atelectasis.    JOSEPH GAMINO M.D., ATTENDING RADIOGIST  This document has been electronically signed. Apr  3 2022  9:00AM  ---------------------------------------------------------------------------------------------------------------

## 2022-04-21 NOTE — PROGRESS NOTE ADULT - SUBJECTIVE AND OBJECTIVE BOX
infectious diseases progress note:    Patient is a 87y old  Male who presents with a chief complaint of Preoperative Planning for Right above knee amputation (21 Apr 2022 04:15)        Gangrene, not elsewhere classified    Peripheral vascular disease               Allergies    No Known Allergies    Intolerances        ANTIBIOTICS/RELEVANT:  antimicrobials    immunologic:    OTHER:  acetaminophen     Tablet .. 975 milliGRAM(s) Oral every 8 hours  albuterol/ipratropium for Nebulization 3 milliLiter(s) Nebulizer every 6 hours  atorvastatin 40 milliGRAM(s) Oral at bedtime  buDESOnide    Inhalation Suspension 0.5 milliGRAM(s) Inhalation every 12 hours  chlorhexidine 2% Cloths 1 Application(s) Topical <User Schedule>  finasteride 5 milliGRAM(s) Oral daily  furosemide   Injectable 40 milliGRAM(s) IV Push every 24 hours  gabapentin 300 milliGRAM(s) Oral every 8 hours  guaiFENesin ER 1200 milliGRAM(s) Oral every 12 hours  heparin  Infusion 1250 Unit(s)/Hr IV Continuous <Continuous>  insulin glargine Injectable (LANTUS) 34 Unit(s) SubCutaneous at bedtime  insulin lispro (ADMELOG) corrective regimen sliding scale   SubCutaneous three times a day before meals  insulin lispro (ADMELOG) corrective regimen sliding scale   SubCutaneous at bedtime  insulin lispro Injectable (ADMELOG) 10 Unit(s) SubCutaneous before dinner  insulin lispro Injectable (ADMELOG) 8 Unit(s) SubCutaneous before lunch  insulin lispro Injectable (ADMELOG) 11 Unit(s) SubCutaneous before breakfast  levothyroxine 25 MICROGram(s) Oral daily  metoprolol succinate ER 25 milliGRAM(s) Oral daily  montelukast 10 milliGRAM(s) Oral daily  multivitamin/minerals 1 Tablet(s) Oral daily  pantoprazole    Tablet 40 milliGRAM(s) Oral before breakfast  polyethylene glycol 3350 17 Gram(s) Oral daily  predniSONE   Tablet   Oral   predniSONE   Tablet 20 milliGRAM(s) Oral daily  senna 2 Tablet(s) Oral at bedtime  tamsulosin 0.8 milliGRAM(s) Oral at bedtime  traMADol 50 milliGRAM(s) Oral every 4 hours PRN  traMADol 25 milliGRAM(s) Oral every 4 hours PRN      Objective:  Vital Signs Last 24 Hrs  T(C): 36.4 (21 Apr 2022 05:27), Max: 36.8 (20 Apr 2022 10:25)  T(F): 97.5 (21 Apr 2022 05:27), Max: 98.3 (20 Apr 2022 10:25)  HR: 66 (21 Apr 2022 05:27) (66 - 79)  BP: 126/67 (21 Apr 2022 05:27) (126/67 - 165/69)  BP(mean): --  RR: 18 (21 Apr 2022 05:27) (17 - 18)  SpO2: 94% (21 Apr 2022 05:27) (92% - 95%)       Ear/Nose/Throat: no oral lesion, no sinus tenderness on percussion	  Neck:no JVD, no lymphadenopathy, supple  Respiratory: CTA leatha  Cardiovascular: S1S2 RRR, no murmurs  Gastrointestinal:soft, (+) BS, no HSM  Extremities:no e/e/c        LABS:                        8.1    22.61 )-----------( 277      ( 21 Apr 2022 07:07 )             27.2     04-21    146<H>  |  109<H>  |  70<H>  ----------------------------<  59<L>  4.3   |  22  |  1.47<H>    Ca    8.6      21 Apr 2022 07:09  Phos  4.2     04-21  Mg     2.3     04-21      PTT - ( 20 Apr 2022 08:58 )  PTT:74.7 sec        MICROBIOLOGY:    RECENT CULTURES:  04-14 @ 11:16 .Blood Blood-Peripheral                No Growth Final          RESPIRATORY CULTURES:              RADIOLOGY & ADDITIONAL STUDIES:        Pager [phone]  After 5 pm/weekends or if no response :[phone]

## 2022-04-21 NOTE — PROGRESS NOTE ADULT - SUBJECTIVE AND OBJECTIVE BOX
GENERAL SURGERY PROGRESS NOTE   ___________________________________________________________________    LIBRA PEÑA | [id number] | 87y Male | [facility] 9MON [address] W1 | LOS 20d    Attending: Anmol Quinn    ___________________________________________________________________    CC: Patient is a 87y old  Male who presents with a chief complaint of Preoperative Planning for Right above knee amputation (2022 19:18)      SUBJECTIVE:   Patient seen today during morning rounds at bedside and found to be without acute distress. Denies chest pain, fever, severe pain, or SOB.     Overnight: Unremarkable    Allegies:  NKDA    OBJECTIVE:  Vitals:    T(C): 36.3 (22 @ 01:03), Max: 36.8 (22 @ 10:25)  HR: 69 (22 @ 01:03) (69 - 79)  BP: 140/65 (22 @ 01:03) (140/65 - 165/69)  RR: 17 (22 @ 01:03) (17 - 18)  SpO2: 95% (22 @ 01:03) (92% - 95%)      OUT:    Indwelling Catheter - Urethral (mL): 4200 mL  Total OUT: 4200 mL      OUT:    Indwelling Catheter - Urethral (mL): 2150 mL  Total OUT: 2150 mL        Physical Exam:   Gen: NAD, resting comfortably in bed  CV: Regular rate  Resp: Nonlabored breathing on room air  Ext: RLE laura GOODMANA stump with wound vac in place holding appropriate suction, knee immobilizer in place, erythema improved around stump site    Medications:  heparin  Infusion 1250 IV Continuous <Continuous>  piperacillin/tazobactam IVPB.. 3.375 IV Intermittent every 8 hours    acetaminophen     Tablet .. 975 milliGRAM(s) Oral every 8 hours  albuterol/ipratropium for Nebulization 3 milliLiter(s) Nebulizer every 6 hours  buDESOnide    Inhalation Suspension 0.5 milliGRAM(s) Inhalation every 12 hours  furosemide   Injectable 40 milliGRAM(s) IV Push every 24 hours  gabapentin 300 milliGRAM(s) Oral every 8 hours  guaiFENesin ER 1200 milliGRAM(s) Oral every 12 hours  metoprolol succinate ER 25 milliGRAM(s) Oral daily  montelukast 10 milliGRAM(s) Oral daily  pantoprazole    Tablet 40 milliGRAM(s) Oral before breakfast  polyethylene glycol 3350 17 Gram(s) Oral daily  senna 2 Tablet(s) Oral at bedtime  tamsulosin 0.8 milliGRAM(s) Oral at bedtime        Laboratory:  WBC: 21.90 H&H: 7.8/26.6 Plt: 279  WBC: 22.85 H&H: 7.8/26.4 Plt: 298    Chemistry:                               Phos: 4.7 M.4  143  |  107  |  79  ----------------------------<  128  4.2   |  22  |  1.72        ,                              Phos: 4.7 M.3  144  |  107  |  82  ----------------------------<  175  4.3   |  22  |  1.66          PTT 74.7 PT/INR ---/---          Reviewed laboratory and imaging     VASCULAR SURGERY PROGRESS NOTE   ___________________________________________________________________    LIBRA PEÑA | [id number] | 87y Male | [facility] [address] W1 | LOS 20d    Attending: Anmol Quinn    ___________________________________________________________________    CC: Patient is a 87y old  Male who presents with a chief complaint of Preoperative Planning for Right above knee amputation (2022 19:18)      SUBJECTIVE:   Patient seen today during morning rounds at bedside and found to be without acute distress. Denies chest pain, fever, severe pain, or SOB.     Overnight: Unremarkable    Allegies:  NKDA    OBJECTIVE:  Vitals:  T(C): 36.3 (22 @ 01:03), Max: 36.8 (22 @ 10:25)  HR: 69 (22 @ 01:03) (69 - 79)  BP: 140/65 (22 @ 01:03) (140/65 - 165/69)  RR: 17 (22 @ 01:03) (17 - 18)  SpO2: 95% (22 @ 01:03) (92% - 95%)      OUT:    Indwelling Catheter - Urethral (mL): 4200 mL  Total OUT: 4200 mL      OUT:    Indwelling Catheter - Urethral (mL): 2150 mL  Total OUT: 2150 mL    Physical Exam:   Gen: NAD, resting comfortably in bed  CV: Regular rate  Resp: Nonlabored breathing on room air  Ext: RLE laura GOODMANA stump with wound vac in place holding appropriate suction, knee immobilizer in place, erythema improved around stump site    Medications:  heparin  Infusion 1250 IV Continuous <Continuous>  piperacillin/tazobactam IVPB.. 3.375 IV Intermittent every 8 hours    acetaminophen     Tablet .. 975 milliGRAM(s) Oral every 8 hours  albuterol/ipratropium for Nebulization 3 milliLiter(s) Nebulizer every 6 hours  buDESOnide    Inhalation Suspension 0.5 milliGRAM(s) Inhalation every 12 hours  furosemide   Injectable 40 milliGRAM(s) IV Push every 24 hours  gabapentin 300 milliGRAM(s) Oral every 8 hours  guaiFENesin ER 1200 milliGRAM(s) Oral every 12 hours  metoprolol succinate ER 25 milliGRAM(s) Oral daily  montelukast 10 milliGRAM(s) Oral daily  pantoprazole    Tablet 40 milliGRAM(s) Oral before breakfast  polyethylene glycol 3350 17 Gram(s) Oral daily  senna 2 Tablet(s) Oral at bedtime  tamsulosin 0.8 milliGRAM(s) Oral at bedtime        Laboratory:  WBC: 21.90 H&H: 7.8/26.6 Plt: 279  WBC: 22.85 H&H: 7.8/26.4 Plt: 298    Chemistry:                               Phos: 4.7 M.4  143  |  107  |  79  ----------------------------<  128  4.2   |  22  |  1.72        ,                              Phos: 4.7 M.3  144  |  107  |  82  ----------------------------<  175  4.3   |  22  |  1.66      PTT 74.7 PT/INR ---/---    Reviewed laboratory and imaging

## 2022-04-21 NOTE — PROGRESS NOTE ADULT - ASSESSMENT
ASSESSMENT:    86 year old gentleman, former smoker, followed by Dr. Alicja Rob of our practice for asthma/COPD overlap  syndrome and obstructive sleep apnea being treated conservatively. He has no history of TOM colonization/infection as listed in the "past medical history". He has been stable from a pulmonary perspective and maintained on budesonide and duoneb once daily and if needed. He also has a history of HTN, HLD, DM, CKD, CVA, CHF (mild systolic dysfunction) and atrial fibrillation with sick sinus syndrome s/p pacemaker implantation. He has been followed for many months for chronic foot wounds and leg pain now with some purulence and gangrene. The pain is exacerbated when laying in bed and improves with tylenol and when hanging the legs off of the bed. He is no longer able to ambulate. 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 The patient has developed marked shortness of breath with severe hypoxemia currently requiring a nasal canula @ 5lpm to maintain saturation @ 90%. He has a cough with copious mucous in his chest which he is unable to expectorate. He has chest congestion and wheeze. No fevers, chills or sweats. No chest pain/pressure or palpitations. Called by the patient's family and asked to be involved with his pulmonary care.    acute hypoxic respiratory failure -> resolved    1) severe COPD exacerbation -> slowly improving but exacerbated by ongoing aspiratipm  2) 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 limiting diaphragmatic excursion and respiratory muscle weakness decreasing chest wall expansion -> bibasilar atelectasis  3) no evidence of pulmonary edema or pneumonia on the most recent CXR  4) dysphagia with dyspnea, cough and chest gurgling after eating    leukocytosis more likely related to systemic steroids than infection    steroid induced hyperglycemia    CKD/KARLOS due to diuresis in the setting of euvolemia -> improved    4/14 - remains in the ICU; continues on an insulin infusion for steroid induced hyperglycemia; worsening of his mental status and chronic left sided weakness and left facial droop after analgesics prior to the VAC change yesterday; CT scan and EEG are unremarkable; started on zosyn for possible "sepsis"; now awake and alert sitting in the chair and back to his baseline mental status; no shortness of breath or hypoxemia on room air; occasional cough productive of scant sputum; minimal chest congestion and wheeze; no fevers, chills or sweats; no chest pain/pressure or palpitations; CXR now has bibasilar atelectasis - there is no evidence of pulmonary edema; on heparin gtt for PAD    4/15 - transferred to the surgical floor; mental status is back to baseline; left facial droop and left sided weakness are no worse than usual; continues on zosyn for possible "sepsis"; awake and alert sitting in the chair; no shortness of breath or hypoxemia on room air; occasional cough productive of scant sputum; minimal chest congestion and wheeze; no fevers, chills or sweats; no chest pain/pressure or palpitations; CXR is with a small left pleural effusion and bibasilar atelectasis - there is no evidence of pulmonary edema; on heparin gtt for PAD    4/20 -  left arm swelling ->no evidence of DVT on Duplex; FEEST revealed severe dysphagia -> non oral nutrition recommended -> the family has elected to continue oral feeding understanding the risks of aspiration; mental status is back to baseline; left facial droop and left sided weakness are no worse than usual; continues on zosyn for possible "sepsis"; awake and alert sitting in the chair; no shortness of breath or hypoxemia on room air; occasional cough productive of scant sputum; mild chest congestion and wheeze especially after eating; no fevers, chills or sweats; no chest pain/pressure or palpitations; CXR reveals improved bibasilar atelectasis - there is no evidence of pulmonary edema; on heparin gtt for PAD    PLAN/RECOMMENDATIONS:    stable oxygenation on room air  EEG -> mild to moderate nonspecific diffuse or multifocal cerebral dysfunction -  no epileptiform patterns or seizures recorded.  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, stable in appearance compared with prior dated 9/8/2019  CXR 4/18 - improving bilateral lower lobe opacities (atelectasis)  started on zosyn for "sepsis" given increasing leukocytosis - blood cultures are negative - no evidence of a UTI on U/A - ID evaluation noted  prednisone 20mg daily - taper by 10mg every 2 days - glucose control on steroids has improved - off an insulin gtt  albuterol/atrovent nebs q6h  pulmicort 0.5mg nebs q12h - give after duoneb - rinse mouth after use  mucinex 1200mg 2 times daily  singulair 10mg @ bedtime  acapella device/incentive spirometer  speech and swallow evaluation noted - severe dysphagia - NPO with non-oral feeding recommended - I explained the ongoing risk of aspiration with possible pneumonia, worsening bronchospasm, hypoxemia, etc to the patient and family - they do not want placement of a NGT or PEG and wish to continue an oral diet - written for a bite sized and easy to chew diet with moderately thickened fluids - small bites - no straws - chin tuck maneuver explained and demonstated  cardiac meds: lipitor/toprol XL/lasix  flomax/proscar  vascular surgery follow-up    heparin gtt    pain control    AKA scheduled for 4/22    respiratory status continues to improve - his hypoxemia has resolved and bronchospasm has improved - at this point, there is no pulmonary contraindication to the proposed AKA  GI prophylaxis - protonix  proscar/flomax  bowel regimen  out of bed and into the chair  LUE Duplex is without DVT    Will follow with you. Plan of care discussed with the patient and his family at bedside and with Dr. Spaulding.    Sarabjit Wright MD, [facility]  [phone]  Pulmonary Medicine

## 2022-04-21 NOTE — CHART NOTE - NSCHARTNOTEFT_GEN_A_CORE
VASCULAR SURGERY PRE-OP NOTE    Preop Diagnosis: Non healing wounds, RLE  Planned Procedure: BKA completion    Pertinent Labs:                        8.1    22.61 )-----------( 277      ( 21 Apr 2022 07:07 )             27.2       04-21    146<H>  |  109<H>  |  70<H>  ----------------------------<  59<L>  4.3   |  22  |  1.47<H>    Ca    8.6      21 Apr 2022 07:09  Phos  4.2     04-21  Mg     2.3     04-21    PTT - ( 21 Apr 2022 07:06 )  PTT:53.0 sec  ------------------------------------------------------------   12 Lead ECG (04.02.22 @ 16:02)   Ventricular Rate 82 BPM  Atrial Rate 82 BPM  P-R Interval 232 ms  QRS Duration 170 ms  Q-T Interval 448 ms  QTC Calculation(Bazett) 523 ms  P Axis 45 degrees  R Axis -36 degrees  T Axis 90 degrees    Diagnosis Line Atrial-sensed ventricular-paced rhythm with prolonged AV conduction WITH OCCASIONAL PREMATURE VENTRICULAR COMPLEXES  ABNORMAL ECG  WHEN COMPARED WITH ECG OF `9/3/21 06:38  PVCs noted  Confirmed by VALENCIA SALAZAR ADAM ([id number]) on 4/4/2022 9:27:24 PM    ------------------------------------------------------------  COVID-19 PCR (04.20.22 @ 07:27)    COVID-19 PCR: NotDetec:   ------------------------------------------------------------  Type + Screen (04.12.22 @ 22:24)    ABO Interpretation: B    Rh Interpretation: Positive    Antibody Screen: Negative  ------------------------------------------------------------    TTE with Doppler (w/Cont) (04.03.22 @ 15:07)   Conclusions:  1. Aortic valve not well visualized; appears calcified.  Peak transaortic valve gradient equals 8 mm Hg, mean  transaortic valve gradient equals 3 mm Hg, estimated aortic  valve area equals 2 sqcm (by continuity equation), aortic  valve velocity time integral equals 22 cm, consistent with  mild aortic stenosis.  2. Endocardial visualization enhanced with intravenous  injection of Ultrasonic Enhancing Agent (Definity). Mild  left ventricular systolicdysfunction. The inferior wall,  and the inferoseptum are hypokinetic.  3. The right ventricle is not well visualized; grossly  normal right ventricular systolic function.  ------------------------------------------------------------    Pulmonology clearance as per : "at this point, there is no pulmonary contraindication to the proposed AKA"  Medicine clearance as per : pt cleared  Cardiology clearance: "s/p BKA, optimized from cardiac standpoint, for BKA formalization"      Plan:  86M PMH HTN, HLD, DM2 and nonhealing wounds of RLE who is non-ambulatory at home now s/p right guillotine BELOW knee amputation. Now scheduled for BKA, completion 4/22.     - AM labs ordered, cbc, bmp, mag, phos, type&s   - NPO/IVF after midnight  - Consent obtained    [phone]  Vascular Surgery

## 2022-04-21 NOTE — PROGRESS NOTE ADULT - ASSESSMENT
87 y/o M w/ uncontrolled Type 2 DM complicated by neuropathy and nephropathy with hyperglycemia exacerbated by steroids, HTN, HLD, hypothyroidism admitted for right LE AKA now on basal/bolus. BG goal (100-180mg/dl).       Uncontrolled type 2 diabetes mellitus with hyperglycemia.    Pt on prednisone taper 20mg daily4/21-4/23. Tolerating POs; OR planning Friday  -test BG AC/HS  -decrease Lantus 26 units QHS for npo status  -Adjusted Admelog 11-8-8 w/meals  -c/w Admelog moderate correction scale AC and mod HS scale  -on soft /bite sized diet ADD CHO restriction  -Prednisone taper ongoing  Outpt. endo follow-up  Outpt. optho, podiatry, nephrology  Plan to d/c on basal + orals including SGLT2 given CKD. Would avoid metformin and sulfonylurea.    Essential hypertension.   ·  Plan: Goal BP <140/90, on metoprolol.    Hyperlipidemia.   ·  Plan: on statin.    Hypothyroidism.   ·  Plan: continue levothyroxine. Repeat TFTs as outpatient  pt has been admitted for prolonged length of time, noted pt has been receiving synthroid at same time as protonix & other medications which alters synthroid absorption  please ensure pt is receiving synthroid 1 hour prior to all other medications on an empty stomach   repeat TFT as outpatient as pt on steroids which can make interpretation difficult so no need to check inpatient at this time     Discussed with patient and primary  team Vascular PA  Contact via [employer] Teams during business hours  On evenings and weekends, please call [phone] or page endocrine fellow on call.   Please note that this patient may be followed by different provider tomorrow.    Time spent on encounter: 28  minutes spent on total encounter; The necessity of the time spent during the encounter on this date of service was due to development of plan of care/coordination of care/glycemic control through review of labs, blood glucose values and vital signs.  87 y/o M w/ uncontrolled Type 2 DM complicated by neuropathy and nephropathy with hyperglycemia exacerbated by steroids, HTN, HLD, hypothyroidism admitted for right LE AKA now on basal/bolus. BG goal (100-180mg/dl).       Uncontrolled type 2 diabetes mellitus with hyperglycemia.    Pt on prednisone taper 20mg daily4/21-4/23. Tolerating POs; OR planning Friday  pt with decreasing insulin requirements past 24 hours , may be due to diet change, also decrease of steroids. tolerating po. decrease regimen   -test BG AC/HS  -decrease Lantus 23 units QHS for npo status  -Adjusted Admelog 7-5-5 w/meals  -c/w Admelog moderate correction scale AC and mod HS scale  -on soft /bite sized diet ADD CHO restriction  -Prednisone taper ongoing  Outpt. endo follow-up  Outpt. optho, podiatry, nephrology  Plan to d/c on basal + orals including SGLT2 given CKD. Would avoid metformin and sulfonylurea.    Essential hypertension.   ·  Plan: Goal BP <140/90, on metoprolol.    Hyperlipidemia.   ·  Plan: on statin.    Hypothyroidism.   ·  Plan: continue levothyroxine. Repeat TFTs as outpatient  pt has been admitted for prolonged length of time, noted pt has been receiving synthroid at same time as protonix & other medications which alters synthroid absorption  please ensure pt is receiving synthroid 1 hour prior to all other medications on an empty stomach   repeat TFT as outpatient as pt on steroids which can make interpretation difficult so no need to check inpatient at this time     Discussed with patient and primary  team Vascular PA  Contact via [employer] Teams during business hours  On evenings and weekends, please call [phone] or page endocrine fellow on call.   Please note that this patient may be followed by different provider tomorrow.    Time spent on encounter: 28  minutes spent on total encounter; The necessity of the time spent during the encounter on this date of service was due to development of plan of care/coordination of care/glycemic control through review of labs, blood glucose values and vital signs.

## 2022-04-21 NOTE — PROGRESS NOTE ADULT - ASSESSMENT
86M with PMH of COPD (not on home o2), prior smoking history (40 pack years), HTN, HLD, Afib, CHF, T2DM, CVA, BPH, and PAD admitted for elective RLE AKA. Renal consulted for CKD Mx.    KARLOS on CKD 3,   baseline Cr ~1.7  serum k stable  bicarb stable  cont monitor Serum Creatinine   cont  lasix 40mg iv qd for now given lower ext edema  off losartan   monitor BMP daily and u/o   dose all meds for eGFR  avoid NSAIDs/Nephrotoxics.      Rt LE nonhealing wound:  s/p amputation  follow up with vascular  Plan AKA friday      Dr Knight  [phone]

## 2022-04-21 NOTE — PROGRESS NOTE ADULT - ASSESSMENT
86M PMH HTN, HLD, DM2 and nonhealing wounds of RLE who is non-ambulatory at home now s/p right guillotine BELOW knee amputation. Postoperative course c/b severe COPD exacerbation requiring excalation of O2 supplementation with HFNC.     - FEES today  - LUE duplex neg dvt  - OR for R AKA completion on Friday.   - Needs cardiology and medicine clearance.   - ID rec: cont [email or web address], cleared for OR  - Per PODS re: left heel discoloration- cont with z flow boot in bed at all times, leave open to air  - Per cards: post op ok to do coumadin or eliquis AND aspirin  - vac changes M/W/F  - hep gtt  - Pain control  - CC diet  - Insulin sliding scale, basal + pre-meal insulin  - Continue steroid taper, respiratory treatment  - Appreciate cards, pulm, nephro, and medicine recs    Vascular Surgery  [id number]       86M PMH HTN, HLD, DM2 and nonhealing wounds of RLE who is non-ambulatory at home now s/p right guillotine BELOW knee amputation. Postoperative course c/b severe COPD exacerbation requiring excalation of O2 supplementation with HFNC.     - LUE duplex neg dvt  - OR for R AKA completion tomorrow  - Clearances obtained  - ID rec: cont [email or web address], cleared for OR  - Per PODS re: left heel discoloration- cont with z flow boot in bed at all times, leave open to air  - Per cards: post op ok to do coumadin or eliquis AND aspirin  - vac changes M/W/F  - Continue heparin drip  - Pain control  - CC diet  - Insulin sliding scale, basal + pre-meal insulin  - Continue steroid taper, respiratory treatment  - Appreciate cards, pulm, nephro, and medicine recs    Vascular Surgery  [id number]

## 2022-04-21 NOTE — PROGRESS NOTE ADULT - SUBJECTIVE AND OBJECTIVE BOX
Ritesh Bell MD  Interventional Cardiology / Endovascular Specialist  [place] Office : [address] [place] [address] [place] [address]  Tel:   [place] Office : [address] [facility] [place] [address]  Tel: [phone]    Subjective/Overnight events: Pt is lying in bed comfortable not in distress  	  MEDICATIONS:  furosemide   Injectable 40 milliGRAM(s) IV Push every 24 hours  heparin  Infusion 1450 Unit(s)/Hr IV Continuous <Continuous>  metoprolol succinate ER 25 milliGRAM(s) Oral daily  tamsulosin 0.8 milliGRAM(s) Oral at bedtime      albuterol/ipratropium for Nebulization 3 milliLiter(s) Nebulizer every 6 hours  buDESOnide    Inhalation Suspension 0.5 milliGRAM(s) Inhalation every 12 hours  guaiFENesin ER 1200 milliGRAM(s) Oral every 12 hours  montelukast 10 milliGRAM(s) Oral daily    acetaminophen     Tablet .. 975 milliGRAM(s) Oral every 8 hours  gabapentin 300 milliGRAM(s) Oral every 8 hours  traMADol 25 milliGRAM(s) Oral every 4 hours PRN  traMADol 50 milliGRAM(s) Oral every 4 hours PRN    pantoprazole    Tablet 40 milliGRAM(s) Oral before breakfast  polyethylene glycol 3350 17 Gram(s) Oral daily  senna 2 Tablet(s) Oral at bedtime    atorvastatin 40 milliGRAM(s) Oral at bedtime  finasteride 5 milliGRAM(s) Oral daily  insulin glargine Injectable (LANTUS) 23 Unit(s) SubCutaneous at bedtime  insulin lispro (ADMELOG) corrective regimen sliding scale   SubCutaneous three times a day before meals  insulin lispro (ADMELOG) corrective regimen sliding scale   SubCutaneous at bedtime  insulin lispro Injectable (ADMELOG) 5 Unit(s) SubCutaneous before lunch  insulin lispro Injectable (ADMELOG) 5 Unit(s) SubCutaneous before dinner  levothyroxine 25 MICROGram(s) Oral daily  predniSONE   Tablet   Oral   predniSONE   Tablet 20 milliGRAM(s) Oral daily    chlorhexidine 2% Cloths 1 Application(s) Topical <User Schedule>  multivitamin/minerals 1 Tablet(s) Oral daily      PAST MEDICAL/SURGICAL HISTORY  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    Deep Vein Thrombosis (DVT)  17 yeras ago on Coumadin    CHF (congestive heart failure)  last exacerbation in 1/2017    Enlarged prostate    GERD (gastroesophageal reflux disease)    Hernia  umblical    Calculus of bile duct without cholangitis or cholecystitis without obstruction    Atrial fibrillation    S/P Hernia Repair    S/P cataract surgery, unspecified laterality    S/P ERCP  3/2017        SOCIAL HISTORY: Substance Use (street drugs): ( x ) never used  (  ) other:    FAMILY HISTORY:      REVIEW OF SYSTEMS:  CONSTITUTIONAL: No fever, weight loss, or fatigue  EYES: No eye pain, visual disturbances, or discharge  ENMT:  No difficulty hearing, tinnitus, vertigo; No sinus or throat pain  BREASTS: No pain, masses, or nipple discharge  GASTROINTESTINAL: No abdominal or epigastric pain. No nausea, vomiting, or hematemesis; No diarrhea or constipation. No melena or hematochezia.  GENITOURINARY: No dysuria, frequency, hematuria, or incontinence  NEUROLOGICAL: No headaches, memory loss, loss of strength, numbness, or tremors  ENDOCRINE: No heat or cold intolerance; No hair loss  MUSCULOSKELETAL: No joint pain or swelling; No muscle, back, or extremity pain  PSYCHIATRIC: No depression, anxiety, mood swings, or difficulty sleeping  HEME/LYMPH: No easy bruising, or bleeding gums  All others negative    PHYSICAL EXAM:  T(C): 36.4 (04-21-22 @ 17:10), Max: 36.6 (04-21-22 @ 10:01)  HR: 64 (04-21-22 @ 17:10) (64 - 79)  BP: 139/67 (04-21-22 @ 17:10) (126/67 - 149/69)  RR: 18 (04-21-22 @ 17:10) (17 - 18)  SpO2: 96% (04-21-22 @ 17:10) (92% - 96%)  Wt(kg): --  I&O's Summary    20 Apr 2022 07:01  -  21 Apr 2022 07:00  --------------------------------------------------------  IN: 694 mL / OUT: 2650 mL / NET: -1956 mL    21 Apr 2022 07:01  -  21 Apr 2022 18:45  --------------------------------------------------------  IN: 561 mL / OUT: 1200 mL / NET: -639 mL    EYES:   PERRLA   ENMT:   Moist mucous membranes, Good dentition, No lesions  Cardiovascular: Normal S1 S2, No JVD, No murmurs, No edema  Respiratory: b/l expiratory wheeze   Gastrointestinal:  Soft, Non-tender, + BS	  Extremities: s/p right BKA                              8.1    22.61 )-----------( 277      ( 21 Apr 2022 07:07 )             27.2     04-21    146<H>  |  109<H>  |  70<H>  ----------------------------<  59<L>  4.3   |  22  |  1.47<H>    Ca    8.6      21 Apr 2022 07:09  Phos  4.2     04-21  Mg     2.3     04-21      proBNP:   Lipid Profile:   HgA1c:   TSH:     Consultant(s) Notes Reviewed:  [x ] YES  [ ] NO    Care Discussed with Consultants/Other Providers [ x] YES  [ ] NO    Imaging Personally Reviewed independently:  [x] YES  [ ] NO    All labs, radiologic studies, vitals, orders and medications list reviewed. Patient is seen and examined at bedside. Case discussed with medical team.

## 2022-04-22 NOTE — PRE-ANESTHESIA EVALUATION ADULT - NSANTHLABRESULTSFT_GEN_ALL_CORE
COVID-19 PCR: NotDetec (20 Apr 2022 07:27)  COVID-19 PCR: NotDetec (01 Apr 2022 18:23)  COVID-19 PCR: NotDetec (30 Mar 2022 16:23)

## 2022-04-22 NOTE — PROGRESS NOTE ADULT - ASSESSMENT
86 year old with PVD, COPD, admitted with progressive infected wounds for right BKA. Also with history of TOM (not being treated at present AF, ERCP for stones. Pt had guillotine BKA. Post op had exacerbation of COPD. Received Prednisone in the icu. Has had increasing wbc last several days  (22K)    wound had vac over it but was described as having some erythema .  Also is coughing has rhonchi and lower lobe infiltrates.    WBC could be due to steroids, pna or open wd       continue zosyn for now can stop 48 hr post op if stable       follow up chest x-ray better  steroids being  tapered   no contraindication to second stage of amputation   wbc being  followed  perhaps related to steroids

## 2022-04-22 NOTE — PROGRESS NOTE ADULT - ASSESSMENT
ASSESSMENT:    86 year old gentleman, former smoker, followed by Dr. Alicja Rob of our practice for asthma/COPD overlap  syndrome and obstructive sleep apnea being treated conservatively. He has no history of TOM colonization/infection as listed in the "past medical history". He has been stable from a pulmonary perspective and maintained on budesonide and duoneb once daily and if needed. He also has a history of HTN, HLD, DM, CKD, CVA, CHF (mild systolic dysfunction) and atrial fibrillation with sick sinus syndrome s/p pacemaker implantation. He has been followed for many months for chronic foot wounds and leg pain now with some purulence and gangrene. The pain is exacerbated when laying in bed and improves with tylenol and when hanging the legs off of the bed. He is no longer able to ambulate. 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 The patient has developed marked shortness of breath with severe hypoxemia currently requiring a nasal canula @ 5lpm to maintain saturation @ 90%. He has a cough with copious mucous in his chest which he is unable to expectorate. He has chest congestion and wheeze. No fevers, chills or sweats. No chest pain/pressure or palpitations. Called by the patient's family and asked to be involved with his pulmonary care.    acute hypoxic respiratory failure -> resolved    1) severe COPD exacerbation -> slowly improving but exacerbated by ongoing aspiration  2) 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 and respiratory muscle weakness limiting diaphragmatic excursion and chest wall expansion -> bibasilar atelectasis has improved on repeat CXR  3) no evidence of pulmonary edema or pneumonia    leukocytosis more likely related to systemic steroids than infection - wonder about an underlying hematologic process    steroid induced hyperglycemia    CKD/KARLOS due to diuresis in the setting of euvolemia -> improved    4/14 - remains in the ICU; continues on an insulin infusion for steroid induced hyperglycemia; worsening of his mental status and chronic left sided weakness and left facial droop after analgesics prior to the VAC change yesterday; CT scan and EEG are unremarkable; started on zosyn for possible "sepsis"; now awake and alert sitting in the chair and back to his baseline mental status; no shortness of breath or hypoxemia on room air; occasional cough productive of scant sputum; minimal chest congestion and wheeze; no fevers, chills or sweats; no chest pain/pressure or palpitations; CXR now has bibasilar atelectasis - there is no evidence of pulmonary edema; on heparin gtt for PAD    4/15 - transferred to the surgical floor; mental status is back to baseline; left facial droop and left sided weakness are no worse than usual; continues on zosyn for possible "sepsis"; awake and alert sitting in the chair; no shortness of breath or hypoxemia on room air; occasional cough productive of scant sputum; minimal chest congestion and wheeze; no fevers, chills or sweats; no chest pain/pressure or palpitations; CXR is with a small left pleural effusion and bibasilar atelectasis - there is no evidence of pulmonary edema; on heparin gtt for PAD    4/20 -  left arm swelling ->no evidence of DVT on Duplex; FEEST revealed severe dysphagia -> non oral nutrition recommended -> the family has elected to continue oral feeding understanding the risks of aspiration; mental status is back to baseline; left facial droop and left sided weakness are no worse than usual; continues on zosyn for possible "sepsis"; awake and alert sitting in the chair; no shortness of breath or hypoxemia on room air; occasional cough productive of scant sputum; mild chest congestion and wheeze especially after eating; no fevers, chills or sweats; no chest pain/pressure or palpitations; CXR reveals improved bibasilar atelectasis - there is no evidence of pulmonary edema; on heparin gtt for PAD    4/22 - NPO and off heparin gtt awaiting AKA; awake and alert sitting up in bed; no shortness of breath or hypoxemia on room air; occasional cough productive of scant sputum; chest congestion and wheeze iis much improved and exacerbated when eating; no fevers, chills or sweats; no chest pain/pressure or palpitations; decreased left arm swelling ->no evidence of DVT on Duplex;     PLAN/RECOMMENDATIONS:    stable oxygenation on room air  prednisone 20mg daily - taper by 10mg every 2 days - glucose control on steroids has improved - off an insulin gtt  albuterol/atrovent nebs q6h  pulmicort 0.5mg nebs q12h - give after duoneb - rinse mouth after use  mucinex 1200mg 2 times daily  singulair 10mg @ bedtime  acapella device/incentive spirometer  speech and swallow evaluation noted - severe dysphagia - NPO with non-oral feeding recommended - I explained the ongoing risk of aspiration with possible pneumonia, worsening bronchospasm, hypoxemia, respiratory failure etc to the patient and family - they do not want placement of a NGT or PEG and wish to continue an oral diet - written for a bite sized and easy to chew diet with moderately thickened fluids - small bites - no straws - chin tuck maneuver explained and demonstrated  cardiac meds: lipitor/toprol XL/lasix  flomax/proscar  EEG -> mild to moderate nonspecific diffuse or multifocal cerebral dysfunction -  no epileptiform patterns or seizures recorded.  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, stable in appearance compared with prior dated 9/8/2019  CXR 4/18 - improving bilateral lower lobe opacities (atelectasis)  off zosyn - ID evaluation noted - would consider a hematology evaluation for persistent leukocytosis in the absence of obvious infection and on decreasing dose of steroids  vascular surgery follow-up    heparin gtt -> on hold for OR    pain control    AKA today    respiratory status is stable - his hypoxemia has resolved and bronchospasm has improved - at this point, there is no pulmonary contraindication to the proposed AKA  GI prophylaxis - protonix  proscar/flomax  bowel regimen  out of bed and into the chair  LUE Duplex is without DVT    Will follow with you. Plan of care discussed with the patient and his family at bedside and with Dr. Spaulding.    Sarabjit Wright MD, [facility]  [phone]  Pulmonary Medicine

## 2022-04-22 NOTE — PROGRESS NOTE ADULT - SUBJECTIVE AND OBJECTIVE BOX
Vascular Surgery Progress Note    Subjective/24hour Events:   Patient seen and examined at bedside on AM rounds. No acute events overnight. Pain controlled.     Vital Signs:  Vital Signs Last 24 Hrs  T(C): 36.4 (22 Apr 2022 08:25), Max: 36.8 (21 Apr 2022 21:43)  T(F): 97.5 (22 Apr 2022 08:25), Max: 98.3 (21 Apr 2022 21:43)  HR: 62 (22 Apr 2022 08:25) (62 - 72)  BP: 106/57 (22 Apr 2022 08:25) (106/57 - 149/74)  BP(mean): --  RR: 18 (22 Apr 2022 08:25) (18 - 18)  SpO2: 90% (22 Apr 2022 08:25) (90% - 96%)    CAPILLARY BLOOD GLUCOSE      POCT Blood Glucose.: 176 mg/dL (21 Apr 2022 21:25)  POCT Blood Glucose.: 204 mg/dL (21 Apr 2022 18:03)  POCT Blood Glucose.: 123 mg/dL (21 Apr 2022 13:34)  POCT Blood Glucose.: 122 mg/dL (21 Apr 2022 10:26)      I&O's Detail    21 Apr 2022 07:01  -  22 Apr 2022 07:00  --------------------------------------------------------  IN:    Heparin: 29 mL    Heparin: 148.5 mL    Heparin: 217.5 mL    Oral Fluid: 460 mL  Total IN: 855 mL    OUT:    Indwelling Catheter - Urethral (mL): 3300 mL    VAC (Vacuum Assisted Closure) System (mL): 0 mL  Total OUT: 3300 mL    Total NET: -2445 mL          Physical Exam:  Gen: NAD, resting comfortably in bed  CV: Regular rate  Resp: Nonlabored breathing on room air  Ext: ILANE laura JAMES stump with wound vac in place holding appropriate suction, knee immobilizer in place, erythema improved around stump site      Labs:    04-22    141  |  105  |  64<H>  ----------------------------<  75  4.1   |  23  |  1.33<H>    Ca    8.7      22 Apr 2022 06:53  Phos  3.8     04-22  Mg     2.2     04-22                              8.0    21.20 )-----------( 250      ( 22 Apr 2022 06:53 )             26.4     PT/INR - ( 22 Apr 2022 06:53 )   PT: 12.1 sec;   INR: 1.05 ratio         PTT - ( 22 Apr 2022 06:53 )  PTT:76.8 sec

## 2022-04-22 NOTE — BRIEF OPERATIVE NOTE - NSICDXBRIEFPROCEDURE_GEN_ALL_CORE_FT
PROCEDURES:  Below knee amputation 22-Apr-2022 15:31:23  Jane Quan  
PROCEDURES:  Guilyunierine amputation below knee 04-Apr-2022 12:30:56  Felicitas Arboleda

## 2022-04-22 NOTE — PROGRESS NOTE ADULT - ASSESSMENT
86M PMH HTN, HLD, DM2 and nonhealing wounds of RLE who is non-ambulatory at home now s/p right guillotine BELOW knee amputation. Postoperative course c/b severe COPD exacerbation requiring excalation of O2 supplementation with HFNC.    Plan:  - OR today for R AKA completion  - Hold hep gtt for OR  - Continue Abx per ID recs: Zosyn  - NPO  - Insulin sliding scale, basal + pre-meal insulin  - Per PODS re: left heel discoloration- cont with z flow boot in bed at all times, leave open to air  - Per cards: post op ok to do coumadin or eliquis AND aspirin  - Pain control      Vascular Surgery  [id number]

## 2022-04-22 NOTE — CHART NOTE - NSCHARTNOTEFT_GEN_A_CORE
GENERAL SURGERY POST-OPERATIVE NOTE    LIBRA PEÑA | [id number] | [facility] 9MON [address] W[address]    Procedure: s/p RLE completion BKA    SUBJECTIVE: Patient seen after procedure. Patient tolerated procedure well. Denies any new complaints.   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    Deep Vein Thrombosis (DVT)  17 yeras ago on Coumadin    CHF (congestive heart failure)  last exacerbation in 1/2017    Enlarged prostate    GERD (gastroesophageal reflux disease)    Hernia  umblical    Calculus of bile duct without cholangitis or cholecystitis without obstruction    Atrial fibrillation    S/P Hernia Repair    S/P cataract surgery, unspecified laterality    S/P ERCP  3/2017        PHYSICAL EXAM:  Gen: NAD   Resp: breathing easily   CV: RRR   Abdomen: soft, nontender, nondistended  Vasc: RLE in ace, c/d/i, boots in place b/l  Skin: Normal color, no rashes or cyanosis    Vital Signs Last 24 Hrs  T(C): 36.3 (22 Apr 2022 22:25), Max: 36.6 (22 Apr 2022 01:00)  T(F): 97.4 (22 Apr 2022 22:25), Max: 97.8 (22 Apr 2022 01:00)  HR: 72 (22 Apr 2022 22:25) (59 - 85)  BP: 151/69 (22 Apr 2022 22:25) (106/57 - 181/83)  BP(mean): 92 (22 Apr 2022 16:45) (85 - 119)  RR: 18 (22 Apr 2022 22:25) (16 - 24)  SpO2: 94% (22 Apr 2022 22:25) (89% - 100%)  I&O's Summary    21 Apr 2022 07:01  -  22 Apr 2022 07:00  --------------------------------------------------------  IN: 855 mL / OUT: 3300 mL / NET: -2445 mL    22 Apr 2022 07:01  -  23 Apr 2022 00:22  --------------------------------------------------------  IN: 360 mL / OUT: 1975 mL / NET: -1615 mL      I&O's Detail    21 Apr 2022 07:01  -  22 Apr 2022 07:00  --------------------------------------------------------  IN:    Heparin: 29 mL    Heparin: 148.5 mL    Heparin: 217.5 mL    Oral Fluid: 460 mL  Total IN: 855 mL    OUT:    Indwelling Catheter - Urethral (mL): 3300 mL    VAC (Vacuum Assisted Closure) System (mL): 0 mL  Total OUT: 3300 mL    Total NET: -2445 mL      22 Apr 2022 07:01  -  23 Apr 2022 00:22  --------------------------------------------------------  IN:    Oral Fluid: 360 mL  Total IN: 360 mL    OUT:    Indwelling Catheter - Urethral (mL): 1975 mL  Total OUT: 1975 mL    Total NET: -1615 mL                              8.0    21.20 )-----------( 250      ( 22 Apr 2022 06:53 )             26.4     04-22    141  |  105  |  64<H>  ----------------------------<  75  4.1   |  23  |  1.33<H>    Ca    8.7      22 Apr 2022 06:53  Phos  3.8     04-22  Mg     2.2     04-22     PT/INR - ( 22 Apr 2022 06:53 )   PT: 12.1 sec;   INR: 1.05 ratio         PTT - ( 22 Apr 2022 06:53 )  PTT:76.8 sec    Assessment:  The patient is a 87y M who is now several hours post-op from a completion BKA. Recovering.     Plan:  - Pain control as needed  - Diet: Regs  - Hep GTT tomorrow  - F/U Labs  - IS    ORESTES Barry, PGY-1  Vascular  [id number] GENERAL SURGERY POST-OPERATIVE NOTE    LIBRA PEÑA | [id number] | [facility] 9MON [address]09 W1    Procedure: s/p RLE completion BKA    SUBJECTIVE: Patient seen after procedure. Patient tolerated procedure well. Denies any new complaints.   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    Deep Vein Thrombosis (DVT)  17 yeras ago on Coumadin    CHF (congestive heart failure)  last exacerbation in 1/2017    Enlarged prostate    GERD (gastroesophageal reflux disease)    Hernia  umblical    Calculus of bile duct without cholangitis or cholecystitis without obstruction    Atrial fibrillation    S/P Hernia Repair    S/P cataract surgery, unspecified laterality    S/P ERCP  3/2017        PHYSICAL EXAM:  Gen: NAD   Resp: breathing easily   CV: RRR   Abdomen: soft, nontender, nondistended  Vasc: RLE skin well healing, gauze c/d/i, boots in place  Skin: Normal color, no rashes or cyanosis    Vital Signs Last 24 Hrs  T(C): 36.3 (22 Apr 2022 22:25), Max: 36.6 (22 Apr 2022 01:00)  T(F): 97.4 (22 Apr 2022 22:25), Max: 97.8 (22 Apr 2022 01:00)  HR: 72 (22 Apr 2022 22:25) (59 - 85)  BP: 151/69 (22 Apr 2022 22:25) (106/57 - 181/83)  BP(mean): 92 (22 Apr 2022 16:45) (85 - 119)  RR: 18 (22 Apr 2022 22:25) (16 - 24)  SpO2: 94% (22 Apr 2022 22:25) (89% - 100%)  I&O's Summary    21 Apr 2022 07:01  -  22 Apr 2022 07:00  --------------------------------------------------------  IN: 855 mL / OUT: 3300 mL / NET: -2445 mL    22 Apr 2022 07:01  -  23 Apr 2022 00:22  --------------------------------------------------------  IN: 360 mL / OUT: 1975 mL / NET: -1615 mL      I&O's Detail    21 Apr 2022 07:01  -  22 Apr 2022 07:00  --------------------------------------------------------  IN:    Heparin: 29 mL    Heparin: 148.5 mL    Heparin: 217.5 mL    Oral Fluid: 460 mL  Total IN: 855 mL    OUT:    Indwelling Catheter - Urethral (mL): 3300 mL    VAC (Vacuum Assisted Closure) System (mL): 0 mL  Total OUT: 3300 mL    Total NET: -2445 mL      22 Apr 2022 07:01  -  23 Apr 2022 00:22  --------------------------------------------------------  IN:    Oral Fluid: 360 mL  Total IN: 360 mL    OUT:    Indwelling Catheter - Urethral (mL): 1975 mL  Total OUT: 1975 mL    Total NET: -1615 mL                              8.0    21.20 )-----------( 250      ( 22 Apr 2022 06:53 )             26.4     04-22    141  |  105  |  64<H>  ----------------------------<  75  4.1   |  23  |  1.33<H>    Ca    8.7      22 Apr 2022 06:53  Phos  3.8     04-22  Mg     2.2     04-22     PT/INR - ( 22 Apr 2022 06:53 )   PT: 12.1 sec;   INR: 1.05 ratio         PTT - ( 22 Apr 2022 06:53 )  PTT:76.8 sec    Assessment:  The patient is a 87y M who is now several hours post-op from a completion BKA. Recovering.     Plan:  - Pain control as needed  - Diet: Regs  - Hep GTT tomorrow  - F/U Labs  - IS    ORESTES Barry, PGY-1  Vascular  [id number]

## 2022-04-22 NOTE — BRIEF OPERATIVE NOTE - NSICDXBRIEFPREOP_GEN_ALL_CORE_FT
PRE-OP DIAGNOSIS:  Gangrene of right foot 04-Apr-2022 12:32:09  Felicitas Arboleda  
PRE-OP DIAGNOSIS:  Gangrene of right foot 04-Apr-2022 12:32:09  Felicitas Arboleda

## 2022-04-22 NOTE — PRE-ANESTHESIA EVALUATION ADULT - NSANTHPEFT_GEN_ALL_CORE
GENERAL: NAD  HEAD:  Atraumatic, Normocephalic  CHEST/LUNG: Rhonchi throughout  HEART: Normal S1/S2  PSYCH: AAOx3  NEUROLOGY: decreased sensation and strength LUE and LLE  SKIN: No obvious rashes or lesions

## 2022-04-22 NOTE — PROGRESS NOTE ADULT - SUBJECTIVE AND OBJECTIVE BOX
infectious diseases progress note:    Patient is a 87y old  Male who presents with a chief complaint of Preoperative Planning for Right above knee amputation (21 Apr 2022 20:00)        Gangrene, not elsewhere classified    Peripheral vascular disease             Allergies    No Known Allergies    Intolerances        ANTIBIOTICS/RELEVANT:  antimicrobials    immunologic:    OTHER:  acetaminophen     Tablet .. 975 milliGRAM(s) Oral every 8 hours  albuterol/ipratropium for Nebulization 3 milliLiter(s) Nebulizer every 6 hours  atorvastatin 40 milliGRAM(s) Oral at bedtime  buDESOnide    Inhalation Suspension 0.5 milliGRAM(s) Inhalation every 12 hours  chlorhexidine 2% Cloths 1 Application(s) Topical <User Schedule>  finasteride 5 milliGRAM(s) Oral daily  furosemide   Injectable 40 milliGRAM(s) IV Push every 24 hours  gabapentin 300 milliGRAM(s) Oral every 8 hours  guaiFENesin ER 1200 milliGRAM(s) Oral every 12 hours  insulin glargine Injectable (LANTUS) 23 Unit(s) SubCutaneous at bedtime  insulin lispro (ADMELOG) corrective regimen sliding scale   SubCutaneous three times a day before meals  insulin lispro (ADMELOG) corrective regimen sliding scale   SubCutaneous at bedtime  insulin lispro Injectable (ADMELOG) 5 Unit(s) SubCutaneous before lunch  insulin lispro Injectable (ADMELOG) 5 Unit(s) SubCutaneous before dinner  insulin lispro Injectable (ADMELOG) 7 Unit(s) SubCutaneous before breakfast  levothyroxine 25 MICROGram(s) Oral daily  metoprolol succinate ER 25 milliGRAM(s) Oral daily  montelukast 10 milliGRAM(s) Oral daily  multivitamin/minerals 1 Tablet(s) Oral daily  pantoprazole    Tablet 40 milliGRAM(s) Oral before breakfast  polyethylene glycol 3350 17 Gram(s) Oral daily  predniSONE   Tablet   Oral   senna 2 Tablet(s) Oral at bedtime  tamsulosin 0.8 milliGRAM(s) Oral at bedtime  traMADol 25 milliGRAM(s) Oral every 4 hours PRN  traMADol 50 milliGRAM(s) Oral every 4 hours PRN      Objective:  Vital Signs Last 24 Hrs  T(C): 36.4 (22 Apr 2022 08:25), Max: 36.8 (21 Apr 2022 21:43)  T(F): 97.5 (22 Apr 2022 08:25), Max: 98.3 (21 Apr 2022 21:43)  HR: 62 (22 Apr 2022 08:25) (62 - 72)  BP: 106/57 (22 Apr 2022 08:25) (106/57 - 149/74)  BP(mean): --  RR: 18 (22 Apr 2022 08:25) (18 - 18)  SpO2: 90% (22 Apr 2022 08:25) (90% - 96%)       Eyes:PETRONA, EOMI  Ear/Nose/Throat: no oral lesion, no sinus tenderness on percussion	  Neck:no JVD, no lymphadenopathy, supple  Respiratory: CTA leatha  Cardiovascular: S1S2 RRR, no murmurs  Gastrointestinal:soft, (+) BS, no HSM  Extremities:no e/e/c        LABS:                        8.0    21.20 )-----------( 250      ( 22 Apr 2022 06:53 )             26.4     04-22    141  |  105  |  64<H>  ----------------------------<  75  4.1   |  23  |  1.33<H>    Ca    8.7      22 Apr 2022 06:53  Phos  3.8     04-22  Mg     2.2     04-22      PT/INR - ( 22 Apr 2022 06:53 )   PT: 12.1 sec;   INR: 1.05 ratio         PTT - ( 22 Apr 2022 06:53 )  PTT:76.8 sec        MICROBIOLOGY:    RECENT CULTURES:        RESPIRATORY CULTURES:              RADIOLOGY & ADDITIONAL STUDIES:        Pager [phone]  After 5 pm/weekends or if no response :[phone]

## 2022-04-22 NOTE — PRE-ANESTHESIA EVALUATION ADULT - NSANTHPMHFT_GEN_ALL_CORE
Postop course from initial right BKA c/b hypoxic respiratory failure secondary to a COPD exacerbation that has since resolved as per pulmonary physician  9/13/2021 PPM Interrogation in SCM: DDDR at 60 bpm, 11.2 years of battery, underlying SB in 40s  h/o CVA - residual left hemiparesis, both upper and lower extremity    4/21 Note from Dr. Padmaja Pedroza in SCM: "off ABx, cleared by Pulm, card, ID for planned for staged RLE AKA. medically cleared"  4/22 Note from Dr. Sarabjit Wright (Pulm) in SCM: "respiratory status is stable - his hypoxemia has resolved and bronchospasm has improved - at this point, there is no pulmonary contraindication to the proposed AKA"  4/22 Note from Dr. Ritesh Bell (Cardiology) in SCM: "s/p BKA, optimized from cardiac standpoint, for BKA formalization , recommend pulm optimization as well planned for friday"

## 2022-04-22 NOTE — PROGRESS NOTE ADULT - ASSESSMENT
85 yo male ex  smoker, h/o HTN, HLD, DM, CKD, CVA, CHF (mild systolic dysfunction) and atrial fibrillation with sick sinus syndrome s/p pacemaker implantation.  h/o COPD/ZACKARY, TOM colonization/infection, admitted on 4/1 with RLE wet gangrene, s/p R guillotine BKA, and is awaiting a staged right lower extremity AKA. on heparin gtt for PAD  post op course complicated by acute hypoxic respiratory failure 2/2 COPD exacerbation. now resolved  ptn seen on 9 Jaime  Ptn is now stable on po prednisone taper, pulm following  albuterol/atrovent nebs   pulmicort 0.5mg nebs q12h   mucinex 1200mg 2 times daily  singulair 10mg @ bedtime  acapella device/incentive spirometer  on RA 91-92% pulse Ox  steroid induced hyperglycemia, now off insulin drip, on Lantus and prandial Admelog  off ABx, cleared by Pulm, card, ID for planned for staged RLE AKA. medically cleared  wbc remains elevated, prob 2/2 steroids. ID on board,   MS back at baseline  CKD3/s/p KARLOS due to diuresis in the setting of euvolemia -> improved, renal following  HTN, systolic CHF, Afib stable, cardiology following

## 2022-04-22 NOTE — PROGRESS NOTE ADULT - SUBJECTIVE AND OBJECTIVE BOX
[facility] - PROGRESS NOTE    CHIEF COMPLAINT: acute hypoxic respiratory failure; COPD exacerbation; mucous plugging; atelectasis; weak cough; pleural effusion; dysphagia; right foot gangrene s/p BKA and awaiting AKA    INTERVAL HISTORY: NPO and off heparin gtt awaiting AKA; awake and alert sitting up in bed; no shortness of breath or hypoxemia on room air; occasional cough productive of scant sputum; chest congestion and wheeze iis much improved and exacerbated when eating; no fevers, chills or sweats; no chest pain/pressure or palpitations; decreased left arm swelling ->no evidence of DVT on Duplex; FEEST revealed severe dysphagia -> non oral nutrition recommended -> the family has elected to continue oral feeding understanding the risks of aspiration (small bites and easy to chew diet with moderately thickened fluids ordered); mental status is back to baseline; left facial droop and left sided weakness are no worse than usual;     REVIEW OF SYSTEMS:  Constitutional: As per interval history  HEENT: Within normal limits  CV: As per interval history  Resp: As per interval history  GI: dysphagia  : KARLOS -> resolved  Musculoskeletal: s/p right BKA  Skin: Within normal limits  Neurological: Within normal limits  Psychiatric: Within normal limits  Endocrine: hyperglycemia  Hematologic/Lymphatic: Within normal limits  Allergic/Immunologic: Within normal limits      MEDICATIONS:     Pulmonary "  albuterol/ipratropium for Nebulization 3 milliLiter(s) Nebulizer every 6 hours  buDESOnide    Inhalation Suspension 0.5 milliGRAM(s) Inhalation every 12 hours  guaiFENesin ER 1200 milliGRAM(s) Oral every 12 hours  montelukast 10 milliGRAM(s) Oral daily    Anti-microbials:    Cardiovascular:  furosemide   Injectable 40 milliGRAM(s) IV Push every 24 hours  metoprolol succinate ER 25 milliGRAM(s) Oral daily  tamsulosin 0.8 milliGRAM(s) Oral at bedtime    Other:  acetaminophen     Tablet .. 975 milliGRAM(s) Oral every 8 hours  atorvastatin 40 milliGRAM(s) Oral at bedtime  chlorhexidine 2% Cloths 1 Application(s) Topical <User Schedule>  finasteride 5 milliGRAM(s) Oral daily  gabapentin 300 milliGRAM(s) Oral every 8 hours  insulin glargine Injectable (LANTUS) 23 Unit(s) SubCutaneous at bedtime  insulin lispro (ADMELOG) corrective regimen sliding scale   SubCutaneous three times a day before meals  insulin lispro (ADMELOG) corrective regimen sliding scale   SubCutaneous at bedtime  insulin lispro Injectable (ADMELOG) 5 Unit(s) SubCutaneous before lunch  insulin lispro Injectable (ADMELOG) 5 Unit(s) SubCutaneous before dinner  insulin lispro Injectable (ADMELOG) 7 Unit(s) SubCutaneous before breakfast  levothyroxine 25 MICROGram(s) Oral daily  multivitamin/minerals 1 Tablet(s) Oral daily  pantoprazole    Tablet 40 milliGRAM(s) Oral before breakfast  polyethylene glycol 3350 17 Gram(s) Oral daily  predniSONE   Tablet   Oral   senna 2 Tablet(s) Oral at bedtime    MEDICATIONS  (PRN):  traMADol 50 milliGRAM(s) Oral every 4 hours PRN Severe Pain (7 - 10)  traMADol 25 milliGRAM(s) Oral every 4 hours PRN Moderate Pain (4 - 6)    OBJECTIVE:    POCT Blood Glucose.: 176 mg/dL (2022 21:25)  POCT Blood Glucose.: 204 mg/dL (2022 18:03)  POCT Blood Glucose.: 123 mg/dL (2022 13:34)  POCT Blood Glucose.: 122 mg/dL (2022 10:26)  POCT Blood Glucose.: 73 mg/dL (2022 08:17)    PHYSICAL EXAM:       ICU Vital Signs Last 24 Hrs  T(C): 36.4 (2022 05:29), Max: 36.8 (2022 21:43)  T(F): 97.6 (2022 05:29), Max: 98.3 (2022 21:43)  HR: 62 (2022 05:29) (62 - 72)  BP: 112/67 (2022 05:29) (112/67 - 149/74)  BP(mean): --  ABP: --  ABP(mean): --  RR: 18 (2022 05:29) (18 - 18)  SpO2: 91% (2022 05:29) (91% - 96%) on room air     General: Awake. Alert. Cooperative. Severe cough and wheeze when eating. Appears stated age. Obese. Sitting up in bed  HEENT: Atraumatic. Normocephalic. Anicteric. Normal oral mucosa. PERRL. EOMI.  Neck: Supple. Trachea midline. Thyroid without enlargement/tenderness/nodules. No carotid bruit. No JVD.	  Cardiovascular: Regular rate and rhythm. Distant S1 S2. No murmurs, rubs or gallops.  Respiratory: Respirations unlabored. Minimal bilateral rhonchi and wheeze. No curvature.  Abdomen: Soft. Non-tender. Non-distended. No organomegaly. No masses. Normal bowel sounds.  Extremities: Warm to touch. No clubbing or cyanosis. No pedal edema. Right BKA with VAC dressing in hard brace. Decreased swelling and pitting edema of the left upper extremity  Pulses: Decreased peripheral pulses LLE  Skin: Venous stasis changes left lower extremity  Lymph Nodes: Cervical, supraclavicular and axillary nodes normal  Neurological: Left sided weakness left > arm. Left facial droop. A and O x 3  Psychiatry: Appropriate mood and affect.    LABS:                          8.0    21.20 )-----------( 250      ( 2022 06:53 )             26.4     CBC    WBC  21.20 <==, 22.61 <==, 21.90 <==, 22.85 <==, 22.51 <==, 19.99 <==, 17.75 <==    Hemoglobin  8.0 <<==, 8.1 <<==, 7.8 <<==, 7.8 <<==, 8.5 <<==, 8.4 <<==, 8.4 <<==    Hematocrit  26.4 <==, 27.2 <==, 26.6 <==, 26.4 <==, 27.8 <==, 28.5 <==, 29.0 <==    Platelets  250 <==, 277 <==, 279 <==, 298 <==, 297 <==, 302 <==, 287 <==      146<H>  |  109<H>  |  70<H>  ----------------------------<  59<L>    04-  4.3   |  22  |  1.47<H>      LYTES    sodium  146 <==, 143 <==, 144 <==, 148 <==, 149 <==, 149 <==    potassium   4.3 <==, 4.2 <==, 4.3 <==, 4.2 <==, 3.9 <==, 4.1 <==    chloride  109 <==, 107 <==, 107 <==, 110 <==, 112 <==, 112 <==    carbon dioxide  22 <==, 22 <==, 22 <==, 22 <==, 20 <==, 20 <==    =============================================================================================  RENAL FUNCTION:    Creatinine:   1.47  <<==, 1.72  <<==, 1.66  <<==, 1.67  <<==, 1.75  <<==, 1.62  <<==    BUN:   70 <==, 79 <==, 82 <==, 78 <==, 86 <==, 91 <==    ============================================================================================    calcium   8.6 <==, 8.4 <==, 8.6 <==, 8.7 <==, 8.7 <==, 8.8 <==    phos   4.2 <==, 4.7 <==, 4.7 <==, 4.6 <==, 4.9 <==, 5.0 <==    mag   2.3 <==, 2.4 <==, 2.3 <==, 2.4 <==, 2.5 <==, 2.6 <==    ============================================================================================  PT/INR - ( 2022 07:26 )   PT: 12.2 sec;   INR: 1.05 ratio       PTT - ( 2022 23:50 )  PTT:88.0 sec    ABG - ( 2022 18:43 )  pH: 7.43  /  pCO2: 35    /  pO2: 80    / HCO3: 23    / Base Excess: -0.8  /  SaO2: 99.0      Venous Blood Gas:   @ 22:10  7.40/41/52/25/84.4  VBG Lactate: 1.8    Venous Blood Gas:   @ 22:23  7.40/39/45/24/78.1  VBG Lactate: 2.4    Venous Blood Gas:   @ 22:23  7.40/39/45/24/78.1  VBG Lactate: 2.4    Venous Blood Gas:  04-10 @ 23:19  7.39/42/44/25/72.2  VBG Lactate: 2.5    < from: TTE with Doppler (w/Cont) (22 @ 15:07) >    Patient name: Martir Heart  YOB: 1935   Age: 86 (M)   MR#: [id number]  Study Date: 4/3/2022  Location: [address] [place], [place] [id number]: Angie Olivarez RDCS  Study quality: Technically difficult  Referring Physician: Anmol Quinn MD  BloodPressure: 115/70 mmHg  Height: 173 cm  Weight: 92 kg  BSA: 2.1 m2  ------------------------------------------------------------------------  PROCEDURE: Transthoracic echocardiogram with 2-D, M-Mode  and complete spectral and color flow Doppler. Verbal  consent was obtained for injection of  Ultrasonic Enhancing  Agent following a discussion of risks and benefits.  Following intravenous injection of Ultrasonic Enhancing  Agent, harmonic imaging was performed.  INDICATION: Cardiomyopathy, unspecified (I42.9)  ------------------------------------------------------------------------  Dimensions:    Normal Values:  LA:     3.1    2.0 - 4.0 cm  Ao:     3.5    2.0 - 3.8 cm  SEPTUM: 1.1    0.6 - 1.2 cm  PWT:    1.3    0.6 - 1.1 cm  LVIDd:  4.3    3.0- 5.6 cm  LVIDs:  3.2    1.8 - 4.0 cm  Derived variables:  LVMI: 90 g/m2  RWT: 0.60  Fractional short: 26 %  EF (Visual Estimate): NWV %  Doppler Peak Velocity (m/sec): MV=1.6 AoV=1.4  ------------------------------------------------------------------------  Conclusions:  1. Aortic valve not well visualized; appears calcified.  Peak transaortic valve gradient equals 8 mm Hg, mean  transaortic valve gradient equals 3 mm Hg, estimated aortic  valve area equals 2 sqcm (by continuity equation), aortic  valve velocity time integral equals 22 cm, consistent with  mild aortic stenosis.  2. Endocardial visualization enhanced with intravenous  injection of Ultrasonic Enhancing Agent (Definity). Mild  left ventricular systolic dysfunction. The inferior wall,  and the inferoseptum are hypokinetic.  3. The right ventricle is not well visualized; grossly  normal right ventricular systolic function.  ------------------------------------------------------------------------  Confirmed on  4/3/2022 - 17:12:14 by Krupa Nelson M.D.FASE  ------------------------------------------------------------------------  -----------------------------------------------------------  MICROBIOLOGY:     COVID-19 PCR . (22 @ 18:23)   COVID-19 PCR: NotDetec:     Urinalysis Basic - ( 2022 06:05 )    Color: Light Yellow / Appearance: Clear / S.021 / pH: x  Gluc: x / Ketone: Negative  / Bili: Negative / Urobili: Negative   Blood: x / Protein: Trace / Nitrite: Negative   Leuk Esterase: Moderate / RBC: 185 /hpf / WBC 12 /HPF   Sq Epi: x / Non Sq Epi: 2 /hpf / Bacteria: Negative    Culture - Blood (22 @ 11:16)   Specimen Source: .Blood Blood-Peripheral   Culture Results:   No growth to date.     Culture - Blood (22 @ 11:16)   Specimen Source: .Blood Blood-Peripheral   Culture Results:   No growth to date.     RADIOLOGY:  [x] Chest radiographs reviewed and interpreted by me    EXAM:  DUPLEX EXT VEINS UPPER LT                          PROCEDURE DATE:  2022      IMPRESSION:  No evidence of left upper extremity deep venous thrombosis.    NARCISO MUÑOZ MD; Attending Radiologist  This document has been electronically signed. 2022  8:48PM  ---------------------------------------------------------------------------------------------------------------  EXAM:  XR CHEST PORTABLE URGENT 1V                          PROCEDURE DATE:  2022      FINDINGS:  Left chest wall pacemaker.  The heart size cannot be accurately evaluated on this projection.  Bilateral lower lung hazy opacities, reduced since 2022.  There is no pneumothorax.    IMPRESSION:  Partial clearing of the bases and left effusion.    ANITA INFANTE MD; Resident Radiologist  This document has been electronically signed.  HORACIO HELMS MD; Attending Interventional Radiologist  Thisdocument has been electronically signed. 2022  3:57PM  --------------------------------------------------------------------------------------------------------------  EXAM:  CT BRAIN                          PROCEDURE DATE:  2022      FINDINGS:    Prominence of ventricles and subarachnoid spaces, compatible with   atrophy. Periventricular small vessel white matter ischemic changes.   Encephalomalacia and gliosis is appreciated bilaterally compatible with   old regions of infarction, noted in a high left posterior frontal   location, right posterior parietal/occipital location, and right   cerebellar location. Old ischemic event is also appreciated along the   anterior limb of the internal capsule on the left and along the external   capsular region on the right.    There is prominence of the bifrontal extra-axial regions, suggestive of   bifrontal subdural hygromas, more prominent on the left than the   right-stable compared with prior. Prominent deposition of calcified   plaque within the bilateral carotid siphons, as on prior.    Deposition of calcium appreciated within the bilateral basal ganglia, as   on prior.    No acute intracranial hemorrhage. No intraparenchymal mass lesion, mass   effect, or midline shift.    Appearance of the bilateral optic lenses suggests the patient has   previously undergone prior cataract surgery.    Imaged paranasal sinuses, bilateral mastoid air cells, middle ear   cavities are clear.    IMPRESSION:  1. No acute intracranial hemorrhage.    2. 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 No evidence of an acute ischemic event.    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    DAWN BEHR-VENTURA MD; Attending Radiologist  This document has been electronically signed. 2022  8:25PM  ---------------------------------------------------------------------------------------------------------------   EXAM:  XR CHEST PORTABLE ROUTINE 1V                          PROCEDURE DATE:  2022      FINDINGS:    Support devices: A pacemaker overlies the left chest wall with its leads   intact.    Cardiac/mediastinum/hilum: Heart size cannot be accurately assessed on   this projection.    Lung parenchyma/Pleura: Right lower lung hazy opacities. Bibasilar   atelectasis. Trace left pleural effusion, unchanged. There is no   pneumothorax.    Skeleton/soft tissues: No acute osseous abnormalities.    IMPRESSION:    Right lower lung hazy opacities, which may represent atelectasis versus   infection.    Unchanged trace left pleural effusion.    EDITH HER MD; Resident Radiologist  This document has been electronically signed.  EVON MAN MD; Attending Radiologist  This document has been electronically signed. 2022  5:11PM  ---------------------------------------------------------------------------------------------------------------  EXAM:  XR CHEST PORTABLE ROUTINE 1V                          PROCEDURE DATE:  04/10/2022      FINDINGS:  Left pacemaker with leads in the right atrium and ventricle.    The heart size is not accurately assessed on this projection.  Bilateral lower lung field patchy opacities, left greater than right.  There is no pneumothorax. Trace left pleural effusion.    IMPRESSION:  Bilateral patchy opacities, left greater than right, differential   includes atelectasis or infection.    JAYDON MONTEMAYOR MD; Resident Radiologist  This document has been electronically signed.  SATURNINO CHERRY MD; Attending Radiologist  This document has been electronically signed. Apr 10 2022  9:14AM  --------------------------------------------------------------------------------------------  EXAM:  XR CHEST PORTABLE URGENT 1V                          PROCEDURE DATE:  2022      FINDINGS:  Left chest wall dual-lead pacemaker. Rotated exam limits assessment for   lead tip.    Small left pleural effusion with adjacent opacity. No pneumothorax.    Cardiac size cannot accurately be assessed in this projection.  Aortic   calcifications.      IMPRESSION: Small left pleural effusion with adjacent atelectasis   obscuring evaluation of the underlying lung.    DRE SANCHEZ MD; Resident Radiology  This document has been electronically signed.  WAGNER LAM MD; Attending Radiologist  This document has been electronically signed. 2022  5:54PM  ---------------------------------------------------------------------------------------------------------------  EXAM:  XR CHEST PORTABLE URGENT 1V                          PROCEDURE DATE:  2022      FINDINGS:  Left chest wall dual-lead pacemaker.  The heart is normal in size.  The lungs are clear.  There is no pneumothorax or pleural effusion.    IMPRESSION:  Clear lungs.    KENA CARRINGTON MD; Resident Radiologist  This document has been electronically signed.  SATURNINO CHERRY MD; Attending Radiologist  This document has been electronically signed. 2022 11:40AM  ---------------------------------------------------------------------------------------------------------------  EXAM:  XR CHEST PORTABLE URGENT 1V                          PROCEDURE DATE:  2022      FINDINGS:    Appropriate course of dual-chamber pacemaker. Unremarkable   cardiomediastinal silhouette. Minimal left basilar atelectasis. No   pleural effusion or pneumothorax.      IMPRESSION:    Mild left basilar atelectasis.    JOSEPH GAMINO M.D., ATTENDING RADIOGIST  This document has been electronically signed. Apr  3 2022  9:00AM  ---------------------------------------------------------------------------------------------------------------

## 2022-04-22 NOTE — CHART NOTE - NSCHARTNOTEFT_GEN_A_CORE
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 s/p RLE completion BKA 4/22. BG goal (100-180mg/dl).       Uncontrolled type 2 diabetes mellitus with hyperglycemia.    Pt on prednisone taper to be decreased to 10mg daily x2 days starting 4/23 , s/p RLE Completion BKA today with BG tightly controlled on BMP (Serum BG 75 w/o correlating FS) . Steroids tapered to prednisone 10mg qd x2days starting tomorrow, pt had decreasing insulin requirements yesterday despite  regine PO yesterday; adjust insulin regimen to prevent tightly controlled BG <100   -test BG AC/HS  -decrease Lantus 20 units QHS , given tightly controlled serum FBG while NPO + Steroid decrease  -c/w Admelog 7-5-5 w/meals HOLD IF NOT TOLERATING PO; doses were reduced signficantly yesterday, pt has only received lunch/dinner dose w/o glycemic pattern for adjustment; may need further adjustment tomorrow;   -c/w Admelog moderate correction scale AC and mod HS scale  -on soft /bite sized diet + CHO restriction  -Prednisone taper ongoing  Outpt. endo follow-up  Outpt. optho, podiatry, nephrology  Plan to d/c on basal + orals including SGLT2 given CKD. Would avoid metformin and sulfonylurea.    d/w Vascular    CAPILLARY BLOOD GLUCOSE      POCT Blood Glucose.: 111 mg/dL (22 Apr 2022 15:54)  POCT Blood Glucose.: 176 mg/dL (21 Apr 2022 21:25)  POCT Blood Glucose.: 204 mg/dL (21 Apr 2022 18:03)

## 2022-04-22 NOTE — PROGRESS NOTE ADULT - SUBJECTIVE AND OBJECTIVE BOX
Patient is a 87y old  Male who presents with a chief complaint of Preoperative Planning for Right above knee amputation (22 Apr 2022 09:03)      SUBJECTIVE / OVERNIGHT EVENTS: awaiting AKA    MEDICATIONS  (STANDING):  acetaminophen     Tablet .. 975 milliGRAM(s) Oral every 8 hours  albuterol/ipratropium for Nebulization 3 milliLiter(s) Nebulizer every 6 hours  atorvastatin 40 milliGRAM(s) Oral at bedtime  buDESOnide    Inhalation Suspension 0.5 milliGRAM(s) Inhalation every 12 hours  finasteride 5 milliGRAM(s) Oral daily  furosemide   Injectable 40 milliGRAM(s) IV Push every 24 hours  gabapentin 300 milliGRAM(s) Oral every 8 hours  guaiFENesin ER 1200 milliGRAM(s) Oral every 12 hours  insulin glargine Injectable (LANTUS) 20 Unit(s) SubCutaneous at bedtime  insulin lispro (ADMELOG) corrective regimen sliding scale   SubCutaneous at bedtime  insulin lispro (ADMELOG) corrective regimen sliding scale   SubCutaneous three times a day before meals  insulin lispro Injectable (ADMELOG) 7 Unit(s) SubCutaneous before breakfast  insulin lispro Injectable (ADMELOG) 5 Unit(s) SubCutaneous before lunch  insulin lispro Injectable (ADMELOG) 5 Unit(s) SubCutaneous before dinner  levothyroxine 25 MICROGram(s) Oral daily  metoprolol succinate ER 25 milliGRAM(s) Oral daily  montelukast 10 milliGRAM(s) Oral daily  multivitamin/minerals 1 Tablet(s) Oral daily  pantoprazole    Tablet 40 milliGRAM(s) Oral before breakfast  polyethylene glycol 3350 17 Gram(s) Oral daily  senna 2 Tablet(s) Oral at bedtime  tamsulosin 0.8 milliGRAM(s) Oral at bedtime    MEDICATIONS  (PRN):  fentaNYL    Injectable 25 MICROGram(s) IV Push every 15 minutes PRN Moderate Pain (4 - 6)  fentaNYL    Injectable 50 MICROGram(s) IV Push every 15 minutes PRN Severe Pain (7 - 10)  traMADol 25 milliGRAM(s) Oral every 4 hours PRN Moderate Pain (4 - 6)  traMADol 50 milliGRAM(s) Oral every 4 hours PRN Severe Pain (7 - 10)      Vital Signs Last 24 Hrs  T(F): 97 (04-22-22 @ 14:17), Max: 98.3 (04-21-22 @ 21:43)  HR: 60 (04-22-22 @ 15:45) (59 - 76)  BP: 154/71 (04-22-22 @ 15:00) (106/57 - 181/83)  RR: 16 (04-22-22 @ 15:45) (16 - 24)  SpO2: 96% (04-22-22 @ 15:45) (89% - 100%)  Telemetry:   CAPILLARY BLOOD GLUCOSE      POCT Blood Glucose.: 111 mg/dL (22 Apr 2022 15:54)  POCT Blood Glucose.: 176 mg/dL (21 Apr 2022 21:25)  POCT Blood Glucose.: 204 mg/dL (21 Apr 2022 18:03)    I&O's Summary    21 Apr 2022 07:01  -  22 Apr 2022 07:00  --------------------------------------------------------  IN: 855 mL / OUT: 3300 mL / NET: -2445 mL    22 Apr 2022 07:01  -  22 Apr 2022 16:13  --------------------------------------------------------  IN: 0 mL / OUT: 450 mL / NET: -450 mL        PHYSICAL EXAM:  GENERAL: NAD, well-developed  HEAD:  Atraumatic, Normocephalic  EYES: EOMI, PERRLA, conjunctiva and sclera clear  NECK: Supple, No JVD  CHEST/LUNG: Clear to auscultation bilaterally; No wheeze  HEART: Regular rate and rhythm; No murmurs, rubs, or gallops  ABDOMEN: Soft, Nontender, Nondistended; Bowel sounds present  EXTREMITIES:  2+ Peripheral Pulses, No clubbing, cyanosis, or edema  PSYCH: AAOx3  NEUROLOGY: non-focal  SKIN: No rashes or lesions    LABS:                        8.0    21.20 )-----------( 250      ( 22 Apr 2022 06:53 )             26.4     04-22    141  |  105  |  64<H>  ----------------------------<  75  4.1   |  23  |  1.33<H>    Ca    8.7      22 Apr 2022 06:53  Phos  3.8     04-22  Mg     2.2     04-22      PT/INR - ( 22 Apr 2022 06:53 )   PT: 12.1 sec;   INR: 1.05 ratio         PTT - ( 22 Apr 2022 06:53 )  PTT:76.8 sec          RADIOLOGY & ADDITIONAL TESTS:    Imaging Personally Reviewed:    Consultant(s) Notes Reviewed:      Care Discussed with Consultants/Other Providers:

## 2022-04-22 NOTE — PRE-ANESTHESIA EVALUATION ADULT - NSRADCARDRESULTSFT_GEN_ALL_CORE
4/3/22 TTE:   Conclusions:  1. Aortic valve not well visualized; appears calcified.  Peak transaortic valve gradient equals 8 mm Hg, mean  transaortic valve gradient equals 3 mm Hg, estimated aortic  valve area equals 2 sqcm (by continuity equation), aortic  valve velocity time integral equals 22 cm, consistent with  mild aortic stenosis.  2. Endocardial visualization enhanced with intravenous  injection of Ultrasonic Enhancing Agent (Definity). Mild  left ventricular systolicdysfunction. The inferior wall,  and the inferoseptum are hypokinetic.  3. The right ventricle is not well visualized; grossly  normal right ventricular systolic function.

## 2022-04-23 NOTE — PROGRESS NOTE ADULT - ASSESSMENT
86M PMH HTN, HLD, DM2 and nonhealing wounds of RLE who is non-ambulatory at home now s/p right guillotine BELOW knee amputation. Postoperative course c/b severe COPD exacerbation requiring excalation of O2 supplementation with HFNC. S/p R. AKA completion 4/22.     - hep gtt to resume today  - Continue Abx per ID recs: Zosyn  - regs  - Insulin sliding scale, basal + pre-meal insulin  - Per PODS re: left heel discoloration- cont with z flow boot in bed at all times, leave open to air  - Per cards: post op ok to do coumadin or eliquis AND aspirin  - Pain control      Vascular Surgery  [id number] 86M PMH HTN, HLD, DM2 and nonhealing wounds of RLE who is non-ambulatory at home now s/p right guillotine BELOW knee amputation. Postoperative course c/b severe COPD exacerbation requiring excalation of O2 supplementation with HFNC. S/p R. AKA completion 4/22.     Plan:  - hep gtt to resume today  - Continue Abx per ID recs: Zosyn  - regs  - Insulin sliding scale, basal + pre-meal insulin  - Per PODS re: left heel discoloration- cont with z flow boot in bed at all times, leave open to air  - Per cards: post op ok to do coumadin or eliquis AND aspirin  - Pain control  - Increase dose of Gabapentin to 600 mg TID      Vascular Surgery  [id number] 86M PMH HTN, HLD, DM2 and nonhealing wounds of RLE who is non-ambulatory at home now s/p right guillotine BELOW knee amputation. Postoperative course c/b severe COPD exacerbation requiring excalation of O2 supplementation with HFNC. S/p R. AKA completion 4/22.     Plan:  - hep gtt to resume today  - regs  - Insulin sliding scale, basal + pre-meal insulin  - Per PODS re: left heel discoloration- cont with z flow boot in bed at all times, leave open to air  - Per cards: post op ok to do coumadin or eliquis AND aspirin  - Pain control  - Increase dose of Gabapentin to 600 mg TID      Vascular Surgery  [id number]

## 2022-04-23 NOTE — OCCUPATIONAL THERAPY INITIAL EVALUATION ADULT - ORIENTATION, REHAB EVAL
person/place/time
pt incorrectly reports year (2020) and unable to name [facility]. able to recall pt in hospital/person/place/situation

## 2022-04-23 NOTE — OCCUPATIONAL THERAPY INITIAL EVALUATION ADULT - IMPAIRMENTS CONTRIBUTING IMPAIRED BED MOBILITY, REHAB EVAL
impaired balance/cognition/impaired coordination/impaired motor control/pain/decreased ROM/decreased strength

## 2022-04-23 NOTE — OCCUPATIONAL THERAPY INITIAL EVALUATION ADULT - BED MOBILITY TRAINING, PT EVAL
GOAL: Patient will perform bed mobility with MOD A in 4 weeks
pt will perform supine<>sit with ModAx2 in 4 weeks

## 2022-04-23 NOTE — OCCUPATIONAL THERAPY INITIAL EVALUATION ADULT - DIAGNOSIS, OT EVAL
Pt presents with pain, decreased command following, impaired cognition, vision, decreased ROM, strength, endurance, balance, and coordination, all impacting ability to perform ADLs and functional mobility.
Pt presents with pain, decreased command following, impaired cognition, vision, decreased ROM, strength, endurance, balance, and coordination, all impacting ability to perform ADLs and functional mobility.

## 2022-04-23 NOTE — OCCUPATIONAL THERAPY INITIAL EVALUATION ADULT - RANGE OF MOTION EXAMINATION, UPPER EXTREMITY
R shoulder limited to ~90*/bilateral UE Active ROM was WFL  (within functional limits)
R shoulder limited to ~90*/bilateral UE Active ROM was WFL  (within functional limits)

## 2022-04-23 NOTE — PROGRESS NOTE ADULT - SUBJECTIVE AND OBJECTIVE BOX
Follow-up Pulm Progress Note    No new respiratory events overnight.  Denies increased SOB, chest pain, cough or mucus.    Medications:  MEDICATIONS  (STANDING):  acetaminophen     Tablet .. 975 milliGRAM(s) Oral every 8 hours  albuterol/ipratropium for Nebulization 3 milliLiter(s) Nebulizer every 6 hours  atorvastatin 40 milliGRAM(s) Oral at bedtime  buDESOnide    Inhalation Suspension 0.5 milliGRAM(s) Inhalation every 12 hours  finasteride 5 milliGRAM(s) Oral daily  furosemide   Injectable 40 milliGRAM(s) IV Push every 24 hours  gabapentin 600 milliGRAM(s) Oral every 8 hours  guaiFENesin ER 1200 milliGRAM(s) Oral every 12 hours  heparin  Infusion 1450 Unit(s)/Hr (14.5 mL/Hr) IV Continuous <Continuous>  insulin glargine Injectable (LANTUS) 20 Unit(s) SubCutaneous at bedtime  insulin lispro (ADMELOG) corrective regimen sliding scale   SubCutaneous at bedtime  insulin lispro (ADMELOG) corrective regimen sliding scale   SubCutaneous three times a day before meals  insulin lispro Injectable (ADMELOG) 7 Unit(s) SubCutaneous before breakfast  insulin lispro Injectable (ADMELOG) 5 Unit(s) SubCutaneous before lunch  insulin lispro Injectable (ADMELOG) 5 Unit(s) SubCutaneous before dinner  levothyroxine 25 MICROGram(s) Oral daily  metoprolol succinate ER 25 milliGRAM(s) Oral daily  montelukast 10 milliGRAM(s) Oral daily  multivitamin/minerals 1 Tablet(s) Oral daily  pantoprazole    Tablet 40 milliGRAM(s) Oral before breakfast  polyethylene glycol 3350 17 Gram(s) Oral daily  predniSONE   Tablet 10 milliGRAM(s) Oral daily  senna 2 Tablet(s) Oral at bedtime  tamsulosin 0.8 milliGRAM(s) Oral at bedtime    MEDICATIONS  (PRN):  traMADol 25 milliGRAM(s) Oral every 4 hours PRN Moderate Pain (4 - 6)  traMADol 50 milliGRAM(s) Oral every 4 hours PRN Severe Pain (7 - 10)      Vent settings (if applicable)      Vital Signs Last 24 Hrs  T(C): 36.3 (23 Apr 2022 13:27), Max: 36.6 (23 Apr 2022 01:29)  T(F): 97.4 (23 Apr 2022 13:27), Max: 97.8 (23 Apr 2022 01:29)  HR: 74 (23 Apr 2022 13:27) (59 - 85)  BP: 115/55 (23 Apr 2022 13:27) (108/64 - 181/83)  BP(mean): 92 (22 Apr 2022 16:45) (85 - 119)  RR: 18 (23 Apr 2022 13:27) (16 - 20)  SpO2: 91% (23 Apr 2022 13:27) (90% - 100%)    ABG - ( 22 Apr 2022 12:05 )  pH, Arterial: 7.46  pH, Blood: x     /  pCO2: 36    /  pO2: 66    / HCO3: 26    / Base Excess: 1.8   /  SaO2: 95.4                  04-22 @ 07:01  -  04-23 @ 07:00  --------------------------------------------------------  IN: 480 mL / OUT: 2825 mL / NET: -2345 mL          LABS:                        7.8    18.18 )-----------( 256      ( 23 Apr 2022 06:41 )             26.7     04-23    143  |  106  |  53<H>  ----------------------------<  117<H>  4.4   |  24  |  1.27    Ca    8.8      23 Apr 2022 06:41  Phos  4.2     04-23  Mg     2.2     04-23            CAPILLARY BLOOD GLUCOSE      POCT Blood Glucose.: 135 mg/dL (23 Apr 2022 12:13)    PT/INR - ( 22 Apr 2022 06:53 )   PT: 12.1 sec;   INR: 1.05 ratio         PTT - ( 22 Apr 2022 06:53 )  PTT:76.8 sec          CULTURES:        Physical Examination:  Awake and alert, generally comfortable  HEENT: unremarkable  PULM: Clear to auscultation bilaterally, no significant sputum production  CVS: Regular rate and rhythm, no murmurs, rubs, or gallops  Abd:  soft, non tender  Extrem:s/p right BKA    RADIOLOGY REVIEWED  CXR:    CT chest:     Follow-up Pulm Progress Note    No new respiratory events overnight.  Denies increased SOB, chest pain, cough or mucus.    Medications:  MEDICATIONS  (STANDING):  acetaminophen     Tablet .. 975 milliGRAM(s) Oral every 8 hours  albuterol/ipratropium for Nebulization 3 milliLiter(s) Nebulizer every 6 hours  atorvastatin 40 milliGRAM(s) Oral at bedtime  buDESOnide    Inhalation Suspension 0.5 milliGRAM(s) Inhalation every 12 hours  finasteride 5 milliGRAM(s) Oral daily  furosemide   Injectable 40 milliGRAM(s) IV Push every 24 hours  gabapentin 600 milliGRAM(s) Oral every 8 hours  guaiFENesin ER 1200 milliGRAM(s) Oral every 12 hours  heparin  Infusion 1450 Unit(s)/Hr (14.5 mL/Hr) IV Continuous <Continuous>  insulin glargine Injectable (LANTUS) 20 Unit(s) SubCutaneous at bedtime  insulin lispro (ADMELOG) corrective regimen sliding scale   SubCutaneous at bedtime  insulin lispro (ADMELOG) corrective regimen sliding scale   SubCutaneous three times a day before meals  insulin lispro Injectable (ADMELOG) 7 Unit(s) SubCutaneous before breakfast  insulin lispro Injectable (ADMELOG) 5 Unit(s) SubCutaneous before lunch  insulin lispro Injectable (ADMELOG) 5 Unit(s) SubCutaneous before dinner  levothyroxine 25 MICROGram(s) Oral daily  metoprolol succinate ER 25 milliGRAM(s) Oral daily  montelukast 10 milliGRAM(s) Oral daily  multivitamin/minerals 1 Tablet(s) Oral daily  pantoprazole    Tablet 40 milliGRAM(s) Oral before breakfast  polyethylene glycol 3350 17 Gram(s) Oral daily  predniSONE   Tablet 10 milliGRAM(s) Oral daily  senna 2 Tablet(s) Oral at bedtime  tamsulosin 0.8 milliGRAM(s) Oral at bedtime    MEDICATIONS  (PRN):  traMADol 25 milliGRAM(s) Oral every 4 hours PRN Moderate Pain (4 - 6)  traMADol 50 milliGRAM(s) Oral every 4 hours PRN Severe Pain (7 - 10)      Vent settings (if applicable)      Vital Signs Last 24 Hrs  T(C): 36.3 (23 Apr 2022 13:27), Max: 36.6 (23 Apr 2022 01:29)  T(F): 97.4 (23 Apr 2022 13:27), Max: 97.8 (23 Apr 2022 01:29)  HR: 74 (23 Apr 2022 13:27) (59 - 85)  BP: 115/55 (23 Apr 2022 13:27) (108/64 - 181/83)  BP(mean): 92 (22 Apr 2022 16:45) (85 - 119)  RR: 18 (23 Apr 2022 13:27) (16 - 20)  SpO2: 91% (23 Apr 2022 13:27) (90% - 100%)    ABG - ( 22 Apr 2022 12:05 )  pH, Arterial: 7.46  pH, Blood: x     /  pCO2: 36    /  pO2: 66    / HCO3: 26    / Base Excess: 1.8   /  SaO2: 95.4                  04-22 @ 07:01  -  04-23 @ 07:00  --------------------------------------------------------  IN: 480 mL / OUT: 2825 mL / NET: -2345 mL          LABS:                        7.8    18.18 )-----------( 256      ( 23 Apr 2022 06:41 )             26.7     04-23    143  |  106  |  53<H>  ----------------------------<  117<H>  4.4   |  24  |  1.27    Ca    8.8      23 Apr 2022 06:41  Phos  4.2     04-23  Mg     2.2     04-23            CAPILLARY BLOOD GLUCOSE      POCT Blood Glucose.: 135 mg/dL (23 Apr 2022 12:13)    PT/INR - ( 22 Apr 2022 06:53 )   PT: 12.1 sec;   INR: 1.05 ratio         PTT - ( 22 Apr 2022 06:53 )  PTT:76.8 sec          CULTURES:        Physical Examination:  Awake and alert, generally comfortable  HEENT: unremarkable  PULM: Clear to auscultation bilaterally, no significant sputum production  CVS: Regular rate and rhythm, no murmurs, rubs, or gallops  Abd:  soft, non tender  Extrem:s/p right AKA    RADIOLOGY REVIEWED  CXR:    CT chest:

## 2022-04-23 NOTE — PROGRESS NOTE ADULT - ASSESSMENT
85 yo male ex  smoker, h/o HTN, HLD, DM, CKD, CVA, CHF (mild systolic dysfunction) and atrial fibrillation with sick sinus syndrome s/p pacemaker implantation.  h/o COPD/ZACKARY, TOM colonization/infection, admitted on 4/1 with RLE wet gangrene, s/p R VALERIA MagdalenoA completed on 4/22  on heparin gtt for PAD, transition to DOAC as per Cardiology/Vascular  s/p COPD exacerbation. now stable  on  prednisone taper, pulm following  albuterol/atrovent nebs   pulmicort 0.5mg nebs q12h   mucinex 1200mg 2 times daily  singulair 10mg @ bedtime  acapella device/incentive spirometer  on RA 91-92% pulse Ox  steroid induced hyperglycemia, now off insulin drip, on Lantus and prandial Admelog  CKD3/s/p KARLOS due to diuresis in the setting of euvolemia -> improved, renal following  HTN, systolic CHF, Afib stable, cardiology following

## 2022-04-23 NOTE — OCCUPATIONAL THERAPY INITIAL EVALUATION ADULT - ADL RETRAINING, OT EVAL
GOAL: Patient will require set-up assist for UB dressing within 4 weeks  GOAL: Patient will perform grooming standing sink level independently in 4 weeks
pt will perform feeding using AE/techniques with Mariza in 4 weeks

## 2022-04-23 NOTE — PROGRESS NOTE ADULT - SUBJECTIVE AND OBJECTIVE BOX
Ritesh Bell MD  Interventional Cardiology / Endovascular Specialist  [place] Office : [phone] [place] [address] [place] [address]  Tel:   [place] Office : [address] [facility] [place] [address]  Tel: [phone]    Subjective/Overnight events: Pt is lying in bed comfortable not in distress  	  MEDICATIONS:  furosemide   Injectable 40 milliGRAM(s) IV Push every 24 hours  heparin  Infusion 1450 Unit(s)/Hr IV Continuous <Continuous>  metoprolol succinate ER 25 milliGRAM(s) Oral daily  tamsulosin 0.8 milliGRAM(s) Oral at bedtime      albuterol/ipratropium for Nebulization 3 milliLiter(s) Nebulizer every 6 hours  buDESOnide    Inhalation Suspension 0.5 milliGRAM(s) Inhalation every 12 hours  guaiFENesin ER 1200 milliGRAM(s) Oral every 12 hours  montelukast 10 milliGRAM(s) Oral daily    acetaminophen     Tablet .. 975 milliGRAM(s) Oral every 8 hours  gabapentin 600 milliGRAM(s) Oral every 8 hours  traMADol 25 milliGRAM(s) Oral every 4 hours PRN  traMADol 50 milliGRAM(s) Oral every 4 hours PRN    pantoprazole    Tablet 40 milliGRAM(s) Oral before breakfast  polyethylene glycol 3350 17 Gram(s) Oral daily  senna 2 Tablet(s) Oral at bedtime    atorvastatin 40 milliGRAM(s) Oral at bedtime  finasteride 5 milliGRAM(s) Oral daily  insulin glargine Injectable (LANTUS) 20 Unit(s) SubCutaneous at bedtime  insulin lispro (ADMELOG) corrective regimen sliding scale   SubCutaneous at bedtime  insulin lispro (ADMELOG) corrective regimen sliding scale   SubCutaneous three times a day before meals  insulin lispro Injectable (ADMELOG) 7 Unit(s) SubCutaneous before breakfast  insulin lispro Injectable (ADMELOG) 5 Unit(s) SubCutaneous before lunch  insulin lispro Injectable (ADMELOG) 5 Unit(s) SubCutaneous before dinner  levothyroxine 25 MICROGram(s) Oral daily  predniSONE   Tablet 10 milliGRAM(s) Oral daily    multivitamin/minerals 1 Tablet(s) Oral daily      PAST MEDICAL/SURGICAL HISTORY  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    Deep Vein Thrombosis (DVT)  17 yeras ago on Coumadin    CHF (congestive heart failure)  last exacerbation in 1/2017    Enlarged prostate    GERD (gastroesophageal reflux disease)    Hernia  umblical    Calculus of bile duct without cholangitis or cholecystitis without obstruction    Atrial fibrillation    S/P Hernia Repair    S/P cataract surgery, unspecified laterality    S/P ERCP  3/2017        SOCIAL HISTORY: Substance Use (street drugs): ( x ) never used  (  ) other:    FAMILY HISTORY:      REVIEW OF SYSTEMS:  CONSTITUTIONAL: No fever, weight loss, or fatigue  EYES: No eye pain, visual disturbances, or discharge  ENMT:  No difficulty hearing, tinnitus, vertigo; No sinus or throat pain  BREASTS: No pain, masses, or nipple discharge  GASTROINTESTINAL: No abdominal or epigastric pain. No nausea, vomiting, or hematemesis; No diarrhea or constipation. No melena or hematochezia.  GENITOURINARY: No dysuria, frequency, hematuria, or incontinence  NEUROLOGICAL: No headaches, memory loss, loss of strength, numbness, or tremors  ENDOCRINE: No heat or cold intolerance; No hair loss  MUSCULOSKELETAL: No joint pain or swelling; No muscle, back, or extremity pain  PSYCHIATRIC: No depression, anxiety, mood swings, or difficulty sleeping  HEME/LYMPH: No easy bruising, or bleeding gums  All others negative    PHYSICAL EXAM:  T(C): 36.3 (04-23-22 @ 13:27), Max: 36.6 (04-23-22 @ 01:29)  HR: 74 (04-23-22 @ 13:27) (60 - 85)  BP: 115/55 (04-23-22 @ 13:27) (108/64 - 152/73)  RR: 18 (04-23-22 @ 13:27) (16 - 18)  SpO2: 91% (04-23-22 @ 13:27) (90% - 97%)  Wt(kg): --  I&O's Summary    22 Apr 2022 07:01  -  23 Apr 2022 07:00  --------------------------------------------------------  IN: 480 mL / OUT: 2825 mL / NET: -2345 mL    23 Apr 2022 07:01  -  23 Apr 2022 15:34  --------------------------------------------------------  IN: 480 mL / OUT: 350 mL / NET: 130 mL      EYES:   PERRLA   ENMT:   Moist mucous membranes, Good dentition, No lesions  Cardiovascular: Normal S1 S2, No JVD, No murmurs, No edema  Respiratory: b/l expiratory wheeze   Gastrointestinal:  Soft, Non-tender, + BS	  Extremities: s/p right BKA                              7.8    18.18 )-----------( 256      ( 23 Apr 2022 06:41 )             26.7     04-23    143  |  106  |  53<H>  ----------------------------<  117<H>  4.4   |  24  |  1.27    Ca    8.8      23 Apr 2022 06:41  Phos  4.2     04-23  Mg     2.2     04-23      proBNP:   Lipid Profile:   HgA1c:   TSH:     Consultant(s) Notes Reviewed:  [x ] YES  [ ] NO    Care Discussed with Consultants/Other Providers [ x] YES  [ ] NO    Imaging Personally Reviewed independently:  [x] YES  [ ] NO    All labs, radiologic studies, vitals, orders and medications list reviewed. Patient is seen and examined at bedside. Case discussed with medical team.

## 2022-04-23 NOTE — OCCUPATIONAL THERAPY INITIAL EVALUATION ADULT - RANGE OF MOTION EXAMINATION, LOWER EXTREMITY
bilateral LE Active Assistive ROM was WFL  (within functional limits)
bilateral LE Active Assistive ROM was WFL  (within functional limits)

## 2022-04-23 NOTE — OCCUPATIONAL THERAPY INITIAL EVALUATION ADULT - PERTINENT HX OF CURRENT PROBLEM, REHAB EVAL
86y M with Hx DM, HTN, COPD, and HLD who presented with RLE pain (s/p angio 9/2021), found to have wet gangrene s/p R laura JAMES 4/4. Post-op course c/b severe COPD exacerbation requiring HFNC. SICU consulted for respiratory monitoring. 86y M with Hx DM, HTN, COPD, and HLD who presented with RLE pain (s/p angio 9/2021), found to have wet gangrene s/p R laura JAMES 4/4. Post-op course c/b severe COPD exacerbation requiring HFNC. SICU consulted for respiratory monitoring. Now s/p 4/22 R AKA. 86y M with Hx DM, HTN, COPD, and HLD who presented with RLE pain (s/p angio 9/2021), found to have wet gangrene s/p R laura JAMES 4/4. Post-op course c/b severe COPD exacerbation requiring HFNC. SICU consulted for respiratory monitoring. Now s/p 4/22 RLE completion ERIKA

## 2022-04-23 NOTE — OCCUPATIONAL THERAPY INITIAL EVALUATION ADULT - BALANCE DISTURBANCE, IDENTIFIED IMPAIRMENT CONTRIBUTE, REHAB EVAL
pain/impaired postural control/decreased ROM/decreased strength
pain/impaired postural control/decreased ROM/decreased strength

## 2022-04-23 NOTE — OCCUPATIONAL THERAPY INITIAL EVALUATION ADULT - PLANNED THERAPY INTERVENTIONS, OT EVAL
ADL retraining/balance training/bed mobility training/fine motor coordination training/transfer training
ADL retraining/bed mobility training/cognitive, visual perceptual/neuromuscular re-education/transfer training

## 2022-04-23 NOTE — PROGRESS NOTE ADULT - ASSESSMENT
86M with PMH of COPD (not on home o2), prior smoking history (40 pack years), HTN, HLD, Afib, CHF, T2DM, CVA, BPH, and PAD admitted for elective RLE AKA. Renal consulted for CKD Mx.    KARLOS on CKD 3,   baseline Cr ~1.7  serum k stable  bicarb stable  cont monitor Serum Creatinine   cont  lasix 40mg iv qd for now given lower ext edema  off losartan   monitor BMP daily and u/o   dose all meds for eGFR  avoid NSAIDs/Nephrotoxics.      Rt LE nonhealing wound:  s/p amputation  follow up with vascular  S/P BKA      For any question, call:  Cell # [phone]  Pager # [phone]  Callback # [phone]

## 2022-04-23 NOTE — PROGRESS NOTE ADULT - SUBJECTIVE AND OBJECTIVE BOX
Patient is a 87y old  Male who presents with a chief complaint of Preoperative Planning for Right above knee amputation (23 Apr 2022 15:34)      SUBJECTIVE / OVERNIGHT EVENTS: s/p R AKA 4/22, POD1    MEDICATIONS  (STANDING):  acetaminophen     Tablet .. 975 milliGRAM(s) Oral every 8 hours  albuterol/ipratropium for Nebulization 3 milliLiter(s) Nebulizer every 6 hours  atorvastatin 40 milliGRAM(s) Oral at bedtime  buDESOnide    Inhalation Suspension 0.5 milliGRAM(s) Inhalation every 12 hours  finasteride 5 milliGRAM(s) Oral daily  furosemide   Injectable 40 milliGRAM(s) IV Push every 24 hours  gabapentin 600 milliGRAM(s) Oral every 8 hours  guaiFENesin ER 1200 milliGRAM(s) Oral every 12 hours  heparin  Infusion 1450 Unit(s)/Hr (18 mL/Hr) IV Continuous <Continuous>  insulin glargine Injectable (LANTUS) 20 Unit(s) SubCutaneous at bedtime  insulin lispro (ADMELOG) corrective regimen sliding scale   SubCutaneous at bedtime  insulin lispro (ADMELOG) corrective regimen sliding scale   SubCutaneous three times a day before meals  insulin lispro Injectable (ADMELOG) 7 Unit(s) SubCutaneous before breakfast  insulin lispro Injectable (ADMELOG) 5 Unit(s) SubCutaneous before lunch  insulin lispro Injectable (ADMELOG) 5 Unit(s) SubCutaneous before dinner  levothyroxine 25 MICROGram(s) Oral daily  metoprolol succinate ER 25 milliGRAM(s) Oral daily  montelukast 10 milliGRAM(s) Oral daily  multivitamin/minerals 1 Tablet(s) Oral daily  pantoprazole    Tablet 40 milliGRAM(s) Oral before breakfast  polyethylene glycol 3350 17 Gram(s) Oral daily  predniSONE   Tablet 10 milliGRAM(s) Oral daily  senna 2 Tablet(s) Oral at bedtime  tamsulosin 0.8 milliGRAM(s) Oral at bedtime    MEDICATIONS  (PRN):  traMADol 25 milliGRAM(s) Oral every 4 hours PRN Moderate Pain (4 - 6)  traMADol 50 milliGRAM(s) Oral every 4 hours PRN Severe Pain (7 - 10)      Vital Signs Last 24 Hrs  T(F): 97.4 (04-23-22 @ 13:27), Max: 97.8 (04-23-22 @ 01:29)  HR: 70 (04-23-22 @ 14:45) (60 - 74)  BP: 151/57 (04-23-22 @ 14:45) (108/64 - 152/73)  RR: 18 (04-23-22 @ 13:27) (17 - 18)  SpO2: 98% (04-23-22 @ 14:45) (90% - 98%)  Telemetry:   CAPILLARY BLOOD GLUCOSE      POCT Blood Glucose.: 135 mg/dL (23 Apr 2022 12:13)  POCT Blood Glucose.: 130 mg/dL (23 Apr 2022 08:29)  POCT Blood Glucose.: 181 mg/dL (22 Apr 2022 21:11)  POCT Blood Glucose.: 102 mg/dL (22 Apr 2022 17:53)    I&O's Summary    22 Apr 2022 07:01  -  23 Apr 2022 07:00  --------------------------------------------------------  IN: 480 mL / OUT: 2825 mL / NET: -2345 mL    23 Apr 2022 07:01  -  23 Apr 2022 16:53  --------------------------------------------------------  IN: 480 mL / OUT: 350 mL / NET: 130 mL        PHYSICAL EXAM:  GENERAL: NAD, well-developed  HEAD:  Atraumatic, Normocephalic  EYES: EOMI, PERRLA, conjunctiva and sclera clear  NECK: Supple, No JVD  CHEST/LUNG: Clear to auscultation bilaterally; No wheeze  HEART: Regular rate and rhythm; No murmurs, rubs, or gallops  ABDOMEN: Soft, Nontender, Nondistended; Bowel sounds present  EXTREMITIES:  2+ Peripheral Pulses, No clubbing, cyanosis, or edema  PSYCH: AAOx3  NEUROLOGY: non-focal  SKIN: No rashes or lesions    LABS:                        7.8    18.18 )-----------( 256      ( 23 Apr 2022 06:41 )             26.7     04-23    143  |  106  |  53<H>  ----------------------------<  117<H>  4.4   |  24  |  1.27    Ca    8.8      23 Apr 2022 06:41  Phos  4.2     04-23  Mg     2.2     04-23      PT/INR - ( 22 Apr 2022 06:53 )   PT: 12.1 sec;   INR: 1.05 ratio         PTT - ( 23 Apr 2022 14:50 )  PTT:30.0 sec          RADIOLOGY & ADDITIONAL TESTS:    Imaging Personally Reviewed:    Consultant(s) Notes Reviewed:      Care Discussed with Consultants/Other Providers:

## 2022-04-23 NOTE — PROGRESS NOTE ADULT - ASSESSMENT
ASSESSMENT:    86 year old gentleman, former smoker, followed by Dr. Alicja Rob of our practice for asthma/COPD overlap  syndrome and obstructive sleep apnea being treated conservatively. He has no history of TOM colonization/infection as listed in the "past medical history". He has been stable from a pulmonary perspective and maintained on budesonide and duoneb once daily and if needed. He also has a history of HTN, HLD, DM, CKD, CVA, CHF (mild systolic dysfunction) and atrial fibrillation with sick sinus syndrome s/p pacemaker implantation. He has been followed for many months for chronic foot wounds and leg pain now with some purulence and gangrene. The pain is exacerbated when laying in bed and improves with tylenol and when hanging the legs off of the bed. He is no longer able to ambulate. 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 The patient has developed marked shortness of breath with severe hypoxemia currently requiring a nasal canula @ 5lpm to maintain saturation @ 90%. He has a cough with copious mucous in his chest which he is unable to expectorate. He has chest congestion and wheeze. No fevers, chills or sweats. No chest pain/pressure or palpitations. Called by the patient's family and asked to be involved with his pulmonary care.    acute hypoxic respiratory failure -> resolved    1) severe COPD exacerbation -> slowly improving but exacerbated by ongoing aspiration  2) 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 and respiratory muscle weakness limiting diaphragmatic excursion and chest wall expansion -> bibasilar atelectasis has improved on repeat CXR  3) no evidence of pulmonary edema or pneumonia    leukocytosis more likely related to systemic steroids than infection - wonder about an underlying hematologic process    steroid induced hyperglycemia    CKD/KARLOS due to diuresis in the setting of euvolemia -> improved    4/14 - remains in the ICU; continues on an insulin infusion for steroid induced hyperglycemia; worsening of his mental status and chronic left sided weakness and left facial droop after analgesics prior to the VAC change yesterday; CT scan and EEG are unremarkable; started on zosyn for possible "sepsis"; now awake and alert sitting in the chair and back to his baseline mental status; no shortness of breath or hypoxemia on room air; occasional cough productive of scant sputum; minimal chest congestion and wheeze; no fevers, chills or sweats; no chest pain/pressure or palpitations; CXR now has bibasilar atelectasis - there is no evidence of pulmonary edema; on heparin gtt for PAD    4/15 - transferred to the surgical floor; mental status is back to baseline; left facial droop and left sided weakness are no worse than usual; continues on zosyn for possible "sepsis"; awake and alert sitting in the chair; no shortness of breath or hypoxemia on room air; occasional cough productive of scant sputum; minimal chest congestion and wheeze; no fevers, chills or sweats; no chest pain/pressure or palpitations; CXR is with a small left pleural effusion and bibasilar atelectasis - there is no evidence of pulmonary edema; on heparin gtt for PAD    4/20 -  left arm swelling ->no evidence of DVT on Duplex; FEEST revealed severe dysphagia -> non oral nutrition recommended -> the family has elected to continue oral feeding understanding the risks of aspiration; mental status is back to baseline; left facial droop and left sided weakness are no worse than usual; continues on zosyn for possible "sepsis"; awake and alert sitting in the chair; no shortness of breath or hypoxemia on room air; occasional cough productive of scant sputum; mild chest congestion and wheeze especially after eating; no fevers, chills or sweats; no chest pain/pressure or palpitations; CXR reveals improved bibasilar atelectasis - there is no evidence of pulmonary edema; on heparin gtt for PAD    4/22 - NPO and off heparin gtt awaiting AKA; awake and alert sitting up in bed; no shortness of breath or hypoxemia on room air; occasional cough productive of scant sputum; chest congestion and wheeze iis much improved and exacerbated when eating; no fevers, chills or sweats; no chest pain/pressure or palpitations; decreased left arm swelling ->no evidence of DVT on Duplex;     PLAN/RECOMMENDATIONS:    stable oxygenation on room air  prednisone 20mg daily - taper by 10mg every 2 days - glucose control on steroids has improved - off an insulin gtt  albuterol/atrovent nebs q6h  pulmicort 0.5mg nebs q12h - give after duoneb - rinse mouth after use  mucinex 1200mg 2 times daily  singulair 10mg @ bedtime  acapella device/incentive spirometer  speech and swallow evaluation noted - severe dysphagia - NPO with non-oral feeding recommended -  pt and familt do not want placement of a NGT or PEG and wish to continue an oral diet - written for a bite sized and easy to chew diet with moderately thickened fluids - small bites - no straws - chin tuck maneuver explained and demonstrated  cardiac meds: lipitor/toprol XL/lasix  flomax/proscar  EEG -> mild to moderate nonspecific diffuse or multifocal cerebral dysfunction -  no epileptiform patterns or seizures recorded.  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, stable in appearance compared with prior dated 9/8/2019  CXR 4/18 - improving bilateral lower lobe opacities (atelectasis)  off zosyn - ID evaluation noted - would consider a hematology evaluation for persistent leukocytosis in the absence of obvious infection and on decreasing dose of steroids  vascular surgery follow-up    heparin gtt -> on hold for OR    pain control    AKA today    respiratory status is stable - his hypoxemia has resolved and bronchospasm has improved - at this point, there is no pulmonary contraindication to the proposed AKA  GI prophylaxis - protonix  proscar/flomax  bowel regimen  out of bed and into the chair  LUE Duplex is without DVT    Nia Rice MD, [facility]  [phone]  Pulmonary Medicine

## 2022-04-23 NOTE — OCCUPATIONAL THERAPY INITIAL EVALUATION ADULT - LIVES WITH, PROFILE
Pt lives in a house with his daughter and her family, +ramp to enter. PTA pt would pivot from bed<>w/c, required assistance for all ADL, would wear diapers and do sponge bathing
Pt lives in a house with his daughter and her family, +ramp to enter. PTA pt would pivot from bed<>w/c, required assistance for all ADL, would wear diapers and do sponge bathing

## 2022-04-23 NOTE — OCCUPATIONAL THERAPY INITIAL EVALUATION ADULT - VISUAL ACUITY
Pt reports h/o cataracts. Able to read time on wall clock however difficulty focusing vision t/o session (eyes wandering)
not tested

## 2022-04-23 NOTE — PROGRESS NOTE ADULT - SUBJECTIVE AND OBJECTIVE BOX
GENERAL SURGERY PROGRESS NOTE   ___________________________________________________________________    LIBRA PEÑA | [id number] | 87y Male | [facility] 9MON 909 W1 | LOS 22d    Attending: Anmol Quinn    ___________________________________________________________________    CC: Patient is a 87y old  Male who presents with a chief complaint of Preoperative Planning for Right above knee amputation (2022 16:13)      SUBJECTIVE:   Patient seen today during morning rounds at bedside and found to be without acute distress. Denies chest pain, fever, severe pain, or SOB.     Overnight: Unremarkable    Allegies:  NKDA    OBJECTIVE:  Vitals:  Height (cm): 172.7  Weight (kg): 92.4  BMI (kg/m2): 31  T(C): 36.6 (22 @ 01:29), Max: 36.6 (22 @ 01:29)  HR: 72 (22 @ 01:29) (59 - 85)  BP: 152/73 (22 @ 01:29) (106/57 - 181/83)  RR: 18 (22 @ 01:29) (16 - 24)  SpO2: 95% (22 @ 01:29) (89% - 100%)      OUT:    Indwelling Catheter - Urethral (mL): 3300 mL    VAC (Vacuum Assisted Closure) System (mL): 0 mL  Total OUT: 3300 mL      OUT:    Indwelling Catheter - Urethral (mL): 2425 mL  Total OUT: 2425 mL        Physical Exam:   Constitutional: resting in bed with no acute distress  Respiratory: unlabored breathing, clear respiration  Gastrointestinal: Abdomen soft, non distended, non-tender  Extremities:  No edema, no calf tenderness  Skin: no cyanosis or rash observed    Medications:    acetaminophen     Tablet .. 975 milliGRAM(s) Oral every 8 hours  albuterol/ipratropium for Nebulization 3 milliLiter(s) Nebulizer every 6 hours  buDESOnide    Inhalation Suspension 0.5 milliGRAM(s) Inhalation every 12 hours  furosemide   Injectable 40 milliGRAM(s) IV Push every 24 hours  gabapentin 300 milliGRAM(s) Oral every 8 hours  guaiFENesin ER 1200 milliGRAM(s) Oral every 12 hours  metoprolol succinate ER 25 milliGRAM(s) Oral daily  montelukast 10 milliGRAM(s) Oral daily  pantoprazole    Tablet 40 milliGRAM(s) Oral before breakfast  polyethylene glycol 3350 17 Gram(s) Oral daily  senna 2 Tablet(s) Oral at bedtime  tamsulosin 0.8 milliGRAM(s) Oral at bedtime        Laboratory:  WBC: 21.20 H&H: 8.0/26.4 Plt: 250  WBC: 22.61 H&H: 8.1/27.2 Plt: 277    Chemistry:                               Phos: 3.8 M.2  141  |  105  |  64  ----------------------------<  75  4.1   |  23  |  1.33        ,                              Phos: 4.2 M.3  146  |  109  |  70  ----------------------------<  59  4.3   |  22  |  1.47          PTT 76.8 PT/INR 12.1/1.05    ABG - ( 2022 12:05 )  pH, Arterial: 7.46  pH, Blood: x     /  pCO2: 36    /  pO2: 66    / HCO3: 26    / Base Excess: 1.8   /  SaO2: 95.4                  Reviewed laboratory and imaging     GENERAL SURGERY PROGRESS NOTE   ___________________________________________________________________    LIBRA PEÑA | [id number] | 87y Male | [facility] 9MON 909 W1 | LOS 22d    Attending: Anmol Quinn    ___________________________________________________________________    CC: Patient is a 87y old  Male who presents with a chief complaint of Preoperative Planning for Right above knee amputation (2022 16:13)      SUBJECTIVE:   Patient seen today during morning rounds at bedside and found to be without acute distress. Complains of pain in RLE. Denies chest pain, fever, severe pain, or SOB.     Overnight: Unremarkable    Allegies:  NKDA    OBJECTIVE:  Vitals:  Height (cm): 172.7  Weight (kg): 92.4  BMI (kg/m2): 31  T(C): 36.6 (22 @ 01:29), Max: 36.6 (22 @ 01:29)  HR: 72 (22 @ 01:29) (59 - 85)  BP: 152/73 (22 @ 01:29) (106/57 - 181/83)  RR: 18 (22 @ 01:29) (16 - 24)  SpO2: 95% (22 @ 01:29) (89% - 100%)      OUT:    Indwelling Catheter - Urethral (mL): 3300 mL    VAC (Vacuum Assisted Closure) System (mL): 0 mL  Total OUT: 3300 mL      OUT:    Indwelling Catheter - Urethral (mL): 2425 mL  Total OUT: 2425 mL        Physical Exam:   Constitutional: resting in bed with no acute distress  Respiratory: unlabored breathing, clear respiration  Gastrointestinal: Abdomen soft, non distended, non-tender  Extremities:  Right BKA with c/i/d dressing. Knee brace in place.   Skin: no cyanosis or rash observed    Medications:    acetaminophen     Tablet .. 975 milliGRAM(s) Oral every 8 hours  albuterol/ipratropium for Nebulization 3 milliLiter(s) Nebulizer every 6 hours  buDESOnide    Inhalation Suspension 0.5 milliGRAM(s) Inhalation every 12 hours  furosemide   Injectable 40 milliGRAM(s) IV Push every 24 hours  gabapentin 300 milliGRAM(s) Oral every 8 hours  guaiFENesin ER 1200 milliGRAM(s) Oral every 12 hours  metoprolol succinate ER 25 milliGRAM(s) Oral daily  montelukast 10 milliGRAM(s) Oral daily  pantoprazole    Tablet 40 milliGRAM(s) Oral before breakfast  polyethylene glycol 3350 17 Gram(s) Oral daily  senna 2 Tablet(s) Oral at bedtime  tamsulosin 0.8 milliGRAM(s) Oral at bedtime        Laboratory:  WBC: 21.20 H&H: 8.0/26.4 Plt: 250  WBC: 22.61 H&H: 8.1/27.2 Plt: 277    Chemistry:                               Phos: 3.8 M.2  141  |  105  |  64  ----------------------------<  75  4.1   |  23  |  1.33        ,                              Phos: 4.2 M.3  146  |  109  |  70  ----------------------------<  59  4.3   |  22  |  1.47          PTT 76.8 PT/INR 12.1/1.05    ABG - ( 2022 12:05 )  pH, Arterial: 7.46  pH, Blood: x     /  pCO2: 36    /  pO2: 66    / HCO3: 26    / Base Excess: 1.8   /  SaO2: 95.4                  Reviewed laboratory and imaging

## 2022-04-23 NOTE — PROGRESS NOTE ADULT - SUBJECTIVE AND OBJECTIVE BOX
[facility] - ORESTES Wall / ORESTES Wynne / KARIE Melendez/ ORESTES Knight/ ORESTES Jang/ ADRIA Lee / TOÑITO Mora / ROBB Kapoor  ---------------------------------------------------------------------------------------------------------------  seen and examined today for KARLOS  Interval : serum creatinine improving  VITALS:  T(F): 97.4 (04-23-22 @ 13:27), Max: 97.8 (04-23-22 @ 01:29)  HR: 74 (04-23-22 @ 13:27)  BP: 115/55 (04-23-22 @ 13:27)  RR: 18 (04-23-22 @ 13:27)  SpO2: 91% (04-23-22 @ 13:27)  Wt(kg): --    04-22 @ 07:01  -  04-23 @ 07:00  --------------------------------------------------------  IN: 480 mL / OUT: 2825 mL / NET: -2345 mL    04-23 @ 07:01  -  04-23 @ 13:44  --------------------------------------------------------  IN: 480 mL / OUT: 350 mL / NET: 130 mL      Physical Exam :-  Constitutional: NAD  Neck: Supple.  Respiratory: Bilateral equal breath sounds,  Cardiovascular: S1, S2 normal,  Gastrointestinal: Bowel Sounds present, soft, non tender.  Extremities: No edema, right BKA  Neurological: Alert and Oriented  Psychiatric: Normal mood, normal affect  Data:-  Allergies :   No Known Allergies    Hospital Medications:   MEDICATIONS  (STANDING):  acetaminophen     Tablet .. 975 milliGRAM(s) Oral every 8 hours  albuterol/ipratropium for Nebulization 3 milliLiter(s) Nebulizer every 6 hours  atorvastatin 40 milliGRAM(s) Oral at bedtime  buDESOnide    Inhalation Suspension 0.5 milliGRAM(s) Inhalation every 12 hours  finasteride 5 milliGRAM(s) Oral daily  furosemide   Injectable 40 milliGRAM(s) IV Push every 24 hours  gabapentin 600 milliGRAM(s) Oral every 8 hours  guaiFENesin ER 1200 milliGRAM(s) Oral every 12 hours  heparin  Infusion 1450 Unit(s)/Hr (14.5 mL/Hr) IV Continuous <Continuous>  insulin glargine Injectable (LANTUS) 20 Unit(s) SubCutaneous at bedtime  insulin lispro (ADMELOG) corrective regimen sliding scale   SubCutaneous at bedtime  insulin lispro (ADMELOG) corrective regimen sliding scale   SubCutaneous three times a day before meals  insulin lispro Injectable (ADMELOG) 7 Unit(s) SubCutaneous before breakfast  insulin lispro Injectable (ADMELOG) 5 Unit(s) SubCutaneous before lunch  insulin lispro Injectable (ADMELOG) 5 Unit(s) SubCutaneous before dinner  levothyroxine 25 MICROGram(s) Oral daily  metoprolol succinate ER 25 milliGRAM(s) Oral daily  montelukast 10 milliGRAM(s) Oral daily  multivitamin/minerals 1 Tablet(s) Oral daily  pantoprazole    Tablet 40 milliGRAM(s) Oral before breakfast  polyethylene glycol 3350 17 Gram(s) Oral daily  predniSONE   Tablet 10 milliGRAM(s) Oral daily  senna 2 Tablet(s) Oral at bedtime  tamsulosin 0.8 milliGRAM(s) Oral at bedtime    04-23    143  |  106  |  53<H>  ----------------------------<  117<H>  4.4   |  24  |  1.27    Ca    8.8      23 Apr 2022 06:41  Phos  4.2     04-23  Mg     2.2     04-23      Creatinine Trend: 1.27 <--, 1.33 <--, 1.47 <--, 1.72 <--, 1.66 <--, 1.67 <--, 1.75 <--                        7.8    18.18 )-----------( 256      ( 23 Apr 2022 06:41 )             26.7

## 2022-04-23 NOTE — OCCUPATIONAL THERAPY INITIAL EVALUATION ADULT - GENERAL OBSERVATIONS, REHAB EVAL
Pt received seated in chair, +IV, +ICU monitoring, +wound vac to R LE, +hudson
Pt received semi-supine in bed +ROSANGELA jaime.

## 2022-04-24 NOTE — PROGRESS NOTE ADULT - SUBJECTIVE AND OBJECTIVE BOX
Vascular Surgery Progress Note    Subjective/24hour Events: Patient seen and examined at bedside on AM rounds. No acute events overnight. Pain controlled.      Vital Signs:  Vital Signs Last 24 Hrs  T(C): 36.4 (24 Apr 2022 10:38), Max: 37.2 (24 Apr 2022 01:32)  T(F): 97.5 (24 Apr 2022 10:38), Max: 98.9 (24 Apr 2022 01:32)  HR: 71 (24 Apr 2022 10:38) (70 - 80)  BP: 128/61 (24 Apr 2022 10:38) (111/50 - 156/70)  BP(mean): --  RR: 18 (24 Apr 2022 10:38) (18 - 18)  SpO2: 91% (24 Apr 2022 10:38) (91% - 98%)    CAPILLARY BLOOD GLUCOSE      POCT Blood Glucose.: 150 mg/dL (24 Apr 2022 08:33)  POCT Blood Glucose.: 113 mg/dL (23 Apr 2022 21:31)  POCT Blood Glucose.: 125 mg/dL (23 Apr 2022 17:51)  POCT Blood Glucose.: 135 mg/dL (23 Apr 2022 12:13)      I&O's Detail    23 Apr 2022 07:01  -  24 Apr 2022 07:00  --------------------------------------------------------  IN:    Heparin: 270 mL    Oral Fluid: 680 mL  Total IN: 950 mL    OUT:    Indwelling Catheter - Urethral (mL): 2250 mL  Total OUT: 2250 mL    Total NET: -1300 mL      24 Apr 2022 07:01  -  24 Apr 2022 11:45  --------------------------------------------------------  IN:    Oral Fluid: 240 mL  Total IN: 240 mL    OUT:  Total OUT: 0 mL    Total NET: 240 mL          Physical Exam:  Gen: NAD, resting comfortably in bed  CV: Regular rate  Resp: Nonlabored breathing with face mask in place  Ext: R BKA stump with operative dressing in place that is c/d/i, no strikethrough noted, stump appears to be healing well, knee immobilizer in place      Labs:    04-24    143  |  105  |  51<H>  ----------------------------<  141<H>  4.2   |  26  |  1.29    Ca    8.6      24 Apr 2022 04:45  Phos  3.7     04-24  Mg     2.1     04-24                              7.1    15.37 )-----------( 206      ( 24 Apr 2022 04:45 )             24.1     PTT - ( 24 Apr 2022 04:45 )  PTT:83.6 sec

## 2022-04-24 NOTE — PROGRESS NOTE ADULT - SUBJECTIVE AND OBJECTIVE BOX
Follow-up Pulm Progress Note    No new respiratory events overnight.  Denies increased SOB, chest pain, cough or mucus.    Medications:  MEDICATIONS  (STANDING):  acetaminophen     Tablet .. 975 milliGRAM(s) Oral every 8 hours  albuterol/ipratropium for Nebulization 3 milliLiter(s) Nebulizer every 6 hours  atorvastatin 40 milliGRAM(s) Oral at bedtime  buDESOnide    Inhalation Suspension 0.5 milliGRAM(s) Inhalation every 12 hours  finasteride 5 milliGRAM(s) Oral daily  furosemide   Injectable 40 milliGRAM(s) IV Push every 24 hours  gabapentin 600 milliGRAM(s) Oral every 8 hours  guaiFENesin ER 1200 milliGRAM(s) Oral every 12 hours  heparin  Infusion 1450 Unit(s)/Hr (18 mL/Hr) IV Continuous <Continuous>  insulin glargine Injectable (LANTUS) 20 Unit(s) SubCutaneous at bedtime  insulin lispro (ADMELOG) corrective regimen sliding scale   SubCutaneous three times a day before meals  insulin lispro (ADMELOG) corrective regimen sliding scale   SubCutaneous at bedtime  insulin lispro Injectable (ADMELOG) 5 Unit(s) SubCutaneous before lunch  insulin lispro Injectable (ADMELOG) 5 Unit(s) SubCutaneous before dinner  insulin lispro Injectable (ADMELOG) 7 Unit(s) SubCutaneous before breakfast  levothyroxine 25 MICROGram(s) Oral daily  metoprolol succinate ER 25 milliGRAM(s) Oral daily  montelukast 10 milliGRAM(s) Oral daily  multivitamin/minerals 1 Tablet(s) Oral daily  pantoprazole    Tablet 40 milliGRAM(s) Oral before breakfast  polyethylene glycol 3350 17 Gram(s) Oral daily  senna 2 Tablet(s) Oral at bedtime  tamsulosin 0.8 milliGRAM(s) Oral at bedtime    MEDICATIONS  (PRN):  traMADol 25 milliGRAM(s) Oral every 4 hours PRN Moderate Pain (4 - 6)  traMADol 50 milliGRAM(s) Oral every 4 hours PRN Severe Pain (7 - 10)      Vent settings (if applicable)      Vital Signs Last 24 Hrs  T(C): 36.8 (24 Apr 2022 13:22), Max: 37.2 (24 Apr 2022 01:32)  T(F): 98.2 (24 Apr 2022 13:22), Max: 98.9 (24 Apr 2022 01:32)  HR: 75 (24 Apr 2022 13:22) (70 - 80)  BP: 124/65 (24 Apr 2022 13:22) (111/50 - 156/70)  BP(mean): --  RR: 18 (24 Apr 2022 13:22) (18 - 18)  SpO2: 91% (24 Apr 2022 13:22) (91% - 98%)          04-23 @ 07:01  -  04-24 @ 07:00  --------------------------------------------------------  IN: 950 mL / OUT: 2250 mL / NET: -1300 mL          LABS:                        7.1    15.37 )-----------( 206      ( 24 Apr 2022 04:45 )             24.1     04-24    143  |  105  |  51<H>  ----------------------------<  141<H>  4.2   |  26  |  1.29    Ca    8.6      24 Apr 2022 04:45  Phos  3.7     04-24  Mg     2.1     04-24            CAPILLARY BLOOD GLUCOSE      POCT Blood Glucose.: 143 mg/dL (24 Apr 2022 13:32)    PTT - ( 24 Apr 2022 04:45 )  PTT:83.6 sec          CULTURES:        Physical Examination:  Awake and alert, generally comfortable  HEENT: unremarkable  PULM: Clear to auscultation bilaterally, no significant sputum production  CVS: Regular rate and rhythm, no murmurs, rubs, or gallops  Abd:  soft, non tender  Extrem: No CCE, R AKA    RADIOLOGY REVIEWED  CXR:    CT chest:

## 2022-04-24 NOTE — PROGRESS NOTE ADULT - SUBJECTIVE AND OBJECTIVE BOX
Patient is a 87y old  Male who presents with a chief complaint of Preoperative Planning for Right above knee amputation (24 Apr 2022 11:45)      SUBJECTIVE / OVERNIGHT EVENTS: POD 2 post R AKA, wound healing well, pain controlled, as per surgery, will start DOAC. podiatry following left heel discoloration, wearing z flow boots in bed    MEDICATIONS  (STANDING):  acetaminophen     Tablet .. 975 milliGRAM(s) Oral every 8 hours  albuterol/ipratropium for Nebulization 3 milliLiter(s) Nebulizer every 6 hours  atorvastatin 40 milliGRAM(s) Oral at bedtime  buDESOnide    Inhalation Suspension 0.5 milliGRAM(s) Inhalation every 12 hours  finasteride 5 milliGRAM(s) Oral daily  furosemide   Injectable 40 milliGRAM(s) IV Push every 24 hours  gabapentin 600 milliGRAM(s) Oral every 8 hours  guaiFENesin ER 1200 milliGRAM(s) Oral every 12 hours  heparin  Infusion 1450 Unit(s)/Hr (18 mL/Hr) IV Continuous <Continuous>  insulin glargine Injectable (LANTUS) 20 Unit(s) SubCutaneous at bedtime  insulin lispro (ADMELOG) corrective regimen sliding scale   SubCutaneous three times a day before meals  insulin lispro (ADMELOG) corrective regimen sliding scale   SubCutaneous at bedtime  insulin lispro Injectable (ADMELOG) 5 Unit(s) SubCutaneous before lunch  insulin lispro Injectable (ADMELOG) 5 Unit(s) SubCutaneous before dinner  insulin lispro Injectable (ADMELOG) 7 Unit(s) SubCutaneous before breakfast  levothyroxine 25 MICROGram(s) Oral daily  metoprolol succinate ER 25 milliGRAM(s) Oral daily  montelukast 10 milliGRAM(s) Oral daily  multivitamin/minerals 1 Tablet(s) Oral daily  pantoprazole    Tablet 40 milliGRAM(s) Oral before breakfast  polyethylene glycol 3350 17 Gram(s) Oral daily  senna 2 Tablet(s) Oral at bedtime  tamsulosin 0.8 milliGRAM(s) Oral at bedtime    MEDICATIONS  (PRN):  traMADol 25 milliGRAM(s) Oral every 4 hours PRN Moderate Pain (4 - 6)  traMADol 50 milliGRAM(s) Oral every 4 hours PRN Severe Pain (7 - 10)      Vital Signs Last 24 Hrs  T(F): 97.5 (04-24-22 @ 10:38), Max: 98.9 (04-24-22 @ 01:32)  HR: 71 (04-24-22 @ 10:38) (70 - 80)  BP: 128/61 (04-24-22 @ 10:38) (111/50 - 156/70)  RR: 18 (04-24-22 @ 10:38) (18 - 18)  SpO2: 91% (04-24-22 @ 10:38) (91% - 98%)  Telemetry:   CAPILLARY BLOOD GLUCOSE      POCT Blood Glucose.: 150 mg/dL (24 Apr 2022 08:33)  POCT Blood Glucose.: 113 mg/dL (23 Apr 2022 21:31)  POCT Blood Glucose.: 125 mg/dL (23 Apr 2022 17:51)    I&O's Summary    23 Apr 2022 07:01  -  24 Apr 2022 07:00  --------------------------------------------------------  IN: 950 mL / OUT: 2250 mL / NET: -1300 mL    24 Apr 2022 07:01  -  24 Apr 2022 13:18  --------------------------------------------------------  IN: 240 mL / OUT: 0 mL / NET: 240 mL        PHYSICAL EXAM:  GENERAL: NAD, well-developed  HEAD:  Atraumatic, Normocephalic  EYES: EOMI, PERRLA, conjunctiva and sclera clear  NECK: Supple, No JVD  CHEST/LUNG: Clear to auscultation bilaterally; No wheeze  HEART: Regular rate and rhythm; No murmurs, rubs, or gallops  ABDOMEN: Soft, Nontender, Nondistended; Bowel sounds present  EXTREMITIES:  2+ Peripheral Pulses, No clubbing, cyanosis, or edema  PSYCH: AAOx3  NEUROLOGY: non-focal  SKIN: No rashes or lesions    LABS:                        7.1    15.37 )-----------( 206      ( 24 Apr 2022 04:45 )             24.1     04-24    143  |  105  |  51<H>  ----------------------------<  141<H>  4.2   |  26  |  1.29    Ca    8.6      24 Apr 2022 04:45  Phos  3.7     04-24  Mg     2.1     04-24      PTT - ( 24 Apr 2022 04:45 )  PTT:83.6 sec          RADIOLOGY & ADDITIONAL TESTS:    Imaging Personally Reviewed:    Consultant(s) Notes Reviewed:      Care Discussed with Consultants/Other Providers:

## 2022-04-24 NOTE — PROGRESS NOTE ADULT - ASSESSMENT
85 yo male ex  smoker, h/o HTN, HLD, DM, CKD, CVA, CHF (mild systolic dysfunction) and atrial fibrillation with sick sinus syndrome s/p pacemaker implantation.  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   podiatry following left heel discoloration, wearing z flow boots in bed  on heparin gtt for PAD, transition to DOAC as per surgery  s/p COPD exacerbation. now stable  prednisone taper completed  albuterol/atrovent nebs   pulmicort 0.5mg nebs q12h   mucinex 1200mg 2 times daily  singulair 10mg @ bedtime  acapella device/incentive spirometer  on RA 91-92% pulse Ox  steroid induced hyperglycemia, now off insulin drip, on Lantus and prandial Admelog  CKD3/s/p KARLOS due to diuresis in the setting of euvolemia -> improved, renal following  HTN, systolic CHF, Afib stable, cardiology following

## 2022-04-24 NOTE — PROGRESS NOTE ADULT - ASSESSMENT
86M with PMH of COPD (not on home o2), prior smoking history (40 pack years), HTN, HLD, Afib, CHF, T2DM, CVA, BPH, and PAD admitted for elective RLE AKA. Renal consulted for CKD Mx.    KARLOS on CKD 3,   baseline Cr ~1.7  serum k stable  bicarb stable  cont monitor Serum Creatinine   cont  lasix 40mg iv qd for now given lower ext edema  off losartan   monitor BMP daily and u/o   dose all meds for eGFR  avoid NSAIDs/Nephrotoxics.    Rt LE nonhealing wound:  follow up with vascular  S/P BKA      For any question, call:  Cell # [phone]  Pager # [phone]  Callback # [phone]

## 2022-04-24 NOTE — PROGRESS NOTE ADULT - ASSESSMENT
86 year old gentleman with a PMH DM, HTN, HLD who has been followed for the past 6 months for foot wounds and leg pain.      EKG -  A sense V paced PVC's  Echo - Mild LV dysfunction mild aortic stenosis    1) pre-op assessment   -pt has h/o moderate LV dysfunction as per echo 2017, no chest pains 2d echo now shows mild LV dysfunction  -12 lead EKG ok,  PPM interrogated  -s/p BKA     2) HTN  -controlled  -c/w metoprolol  -continue to monitor BP    3) Chronic systolic CHF   - hold metolazone   -c/w IV lasix 40mg daily. monitor I&Os    4) ?Atrial fib   - coumadin on hold on IV heparin     5) KARLOS  - held losartan and metolazone  -f/u renal

## 2022-04-24 NOTE — PROGRESS NOTE ADULT - ASSESSMENT
86M PMH HTN, HLD, DM2 and nonhealing wounds of RLE who is non-ambulatory at home now s/p right guillotine BELOW knee amputation. Postoperative course c/b severe COPD exacerbation requiring excalation of O2 supplementation with HFNC. Now s/p R BKA completion 4/22.     Plan:  - Will change dressing tomorrow  - Pain control, gabapentin 600 TID  - Continue hep gtt, goal aPTT 58-99  - Consistent carb diet  - Insulin sliding scale, basal + pre-meal insulin  - Bowel regimen  - Per podiatry re: left heel discoloration- cont with z flow boot in bed at all times, leave open to air  - Per cards: post op ok to transition to coumadin or Eliquis AND aspirin  - Continue home meds as ordered      Vascular Surgery  [id number]

## 2022-04-24 NOTE — PROGRESS NOTE ADULT - ASSESSMENT
ASSESSMENT:    86 year old gentleman, former smoker, followed by Dr. Alicja Rob of our practice for asthma/COPD overlap  syndrome and obstructive sleep apnea being treated conservatively. He has no history of TOM colonization/infection as listed in the "past medical history". He has been stable from a pulmonary perspective and maintained on budesonide and duoneb once daily and if needed. He also has a history of HTN, HLD, DM, CKD, CVA, CHF (mild systolic dysfunction) and atrial fibrillation with sick sinus syndrome s/p pacemaker implantation. He has been followed for many months for chronic foot wounds and leg pain now with some purulence and gangrene. The pain is exacerbated when laying in bed and improves with tylenol and when hanging the legs off of the bed. He is no longer able to ambulate. 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 The patient has developed marked shortness of breath with severe hypoxemia currently requiring a nasal canula @ 5lpm to maintain saturation @ 90%. He has a cough with copious mucous in his chest which he is unable to expectorate. He has chest congestion and wheeze. No fevers, chills or sweats. No chest pain/pressure or palpitations. Called by the patient's family and asked to be involved with his pulmonary care.    acute hypoxic respiratory failure -> resolved    1) severe COPD exacerbation -> slowly improving but exacerbated by ongoing aspiration  2) 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 and respiratory muscle weakness limiting diaphragmatic excursion and chest wall expansion -> bibasilar atelectasis has improved on repeat CXR  3) no evidence of pulmonary edema or pneumonia    leukocytosis more likely related to systemic steroids than infection - wonder about an underlying hematologic process    steroid induced hyperglycemia    CKD/KARLOS due to diuresis in the setting of euvolemia -> improved    4/14 - remains in the ICU; continues on an insulin infusion for steroid induced hyperglycemia; worsening of his mental status and chronic left sided weakness and left facial droop after analgesics prior to the VAC change yesterday; CT scan and EEG are unremarkable; started on zosyn for possible "sepsis"; now awake and alert sitting in the chair and back to his baseline mental status; no shortness of breath or hypoxemia on room air; occasional cough productive of scant sputum; minimal chest congestion and wheeze; no fevers, chills or sweats; no chest pain/pressure or palpitations; CXR now has bibasilar atelectasis - there is no evidence of pulmonary edema; on heparin gtt for PAD    4/15 - transferred to the surgical floor; mental status is back to baseline; left facial droop and left sided weakness are no worse than usual; continues on zosyn for possible "sepsis"; awake and alert sitting in the chair; no shortness of breath or hypoxemia on room air; occasional cough productive of scant sputum; minimal chest congestion and wheeze; no fevers, chills or sweats; no chest pain/pressure or palpitations; CXR is with a small left pleural effusion and bibasilar atelectasis - there is no evidence of pulmonary edema; on heparin gtt for PAD    4/20 -  left arm swelling ->no evidence of DVT on Duplex; FEEST revealed severe dysphagia -> non oral nutrition recommended -> the family has elected to continue oral feeding understanding the risks of aspiration; mental status is back to baseline; left facial droop and left sided weakness are no worse than usual; continues on zosyn for possible "sepsis"; awake and alert sitting in the chair; no shortness of breath or hypoxemia on room air; occasional cough productive of scant sputum; mild chest congestion and wheeze especially after eating; no fevers, chills or sweats; no chest pain/pressure or palpitations; CXR reveals improved bibasilar atelectasis - there is no evidence of pulmonary edema; on heparin gtt for PAD    4/22 - NPO and off heparin gtt awaiting AKA; awake and alert sitting up in bed; no shortness of breath or hypoxemia on room air; occasional cough productive of scant sputum; chest congestion and wheeze iis much improved and exacerbated when eating; no fevers, chills or sweats; no chest pain/pressure or palpitations; decreased left arm swelling ->no evidence of DVT on Duplex;     PLAN/RECOMMENDATIONS:    stable oxygenation on room air  prednisone 20mg daily - taper by 10mg every 2 days - glucose control on steroids has improved - off an insulin gtt  albuterol/atrovent nebs q6h  pulmicort 0.5mg nebs q12h - give after duoneb - rinse mouth after use  mucinex 1200mg 2 times daily  singulair 10mg @ bedtime  acapella device/incentive spirometer  speech and swallow evaluation noted - severe dysphagia - NPO with non-oral feeding recommended -  pt and familt do not want placement of a NGT or PEG and wish to continue an oral diet - written for a bite sized and easy to chew diet with moderately thickened fluids - small bites - no straws - chin tuck maneuver explained and demonstrated  cardiac meds: lipitor/toprol XL/lasix  flomax/proscar  EEG -> mild to moderate nonspecific diffuse or multifocal cerebral dysfunction -  no epileptiform patterns or seizures recorded.  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, stable in appearance compared with prior dated 9/8/2019  CXR 4/18 - improving bilateral lower lobe opacities (atelectasis)  off zosyn - ID evaluation noted - would consider a hematology evaluation for persistent leukocytosis in the absence of obvious infection and on decreasing dose of steroids  vascular surgery follow-up    heparin gtt -> on hold for OR    pain control    AKA today    respiratory status is stable - his hypoxemia has resolved and bronchospasm has improved - at this point, there is no pulmonary contraindication to the proposed AKA  GI prophylaxis - protonix  proscar/flomax  bowel regimen  out of bed and into the chair  LUE Duplex is without DVT    Nia Rice MD, [facility]  [phone]  Pulmonary Medicine

## 2022-04-24 NOTE — PROGRESS NOTE ADULT - SUBJECTIVE AND OBJECTIVE BOX
Ritesh Bell MD  Interventional Cardiology / Endovascular Specialist  [employer] Office : [phone] [place] [address] [place] [address]  Tel:   [place] Office : [address] [facility] [place] [address]  Tel: [phone]    Subjective/Overnight events: Pt is lying in bed comfortable not in distress  	  MEDICATIONS:  furosemide   Injectable 40 milliGRAM(s) IV Push every 24 hours  heparin  Infusion 1450 Unit(s)/Hr IV Continuous <Continuous>  metoprolol succinate ER 25 milliGRAM(s) Oral daily  tamsulosin 0.8 milliGRAM(s) Oral at bedtime      albuterol/ipratropium for Nebulization 3 milliLiter(s) Nebulizer every 6 hours  buDESOnide    Inhalation Suspension 0.5 milliGRAM(s) Inhalation every 12 hours  guaiFENesin ER 1200 milliGRAM(s) Oral every 12 hours  montelukast 10 milliGRAM(s) Oral daily    acetaminophen     Tablet .. 975 milliGRAM(s) Oral every 8 hours  gabapentin 600 milliGRAM(s) Oral every 8 hours  traMADol 25 milliGRAM(s) Oral every 4 hours PRN  traMADol 50 milliGRAM(s) Oral every 4 hours PRN    pantoprazole    Tablet 40 milliGRAM(s) Oral before breakfast  polyethylene glycol 3350 17 Gram(s) Oral daily  senna 2 Tablet(s) Oral at bedtime    atorvastatin 40 milliGRAM(s) Oral at bedtime  finasteride 5 milliGRAM(s) Oral daily  insulin glargine Injectable (LANTUS) 20 Unit(s) SubCutaneous at bedtime  insulin lispro (ADMELOG) corrective regimen sliding scale   SubCutaneous at bedtime  insulin lispro (ADMELOG) corrective regimen sliding scale   SubCutaneous three times a day before meals  insulin lispro Injectable (ADMELOG) 7 Unit(s) SubCutaneous before breakfast  insulin lispro Injectable (ADMELOG) 5 Unit(s) SubCutaneous before lunch  insulin lispro Injectable (ADMELOG) 5 Unit(s) SubCutaneous before dinner  levothyroxine 25 MICROGram(s) Oral daily    multivitamin/minerals 1 Tablet(s) Oral daily      PAST MEDICAL/SURGICAL HISTORY  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    Deep Vein Thrombosis (DVT)  17 yeras ago on Coumadin    CHF (congestive heart failure)  last exacerbation in 1/2017    Enlarged prostate    GERD (gastroesophageal reflux disease)    Hernia  umblical    Calculus of bile duct without cholangitis or cholecystitis without obstruction    Atrial fibrillation    S/P Hernia Repair    S/P cataract surgery, unspecified laterality    S/P ERCP  3/2017        SOCIAL HISTORY: Substance Use (street drugs): ( x ) never used  (  ) other:    FAMILY HISTORY:      REVIEW OF SYSTEMS:  CONSTITUTIONAL: No fever, weight loss, or fatigue  EYES: No eye pain, visual disturbances, or discharge  ENMT:  No difficulty hearing, tinnitus, vertigo; No sinus or throat pain  BREASTS: No pain, masses, or nipple discharge  GASTROINTESTINAL: No abdominal or epigastric pain. No nausea, vomiting, or hematemesis; No diarrhea or constipation. No melena or hematochezia.  GENITOURINARY: No dysuria, frequency, hematuria, or incontinence  NEUROLOGICAL: No headaches, memory loss, loss of strength, numbness, or tremors  ENDOCRINE: No heat or cold intolerance; No hair loss  MUSCULOSKELETAL: No joint pain or swelling; No muscle, back, or extremity pain  PSYCHIATRIC: No depression, anxiety, mood swings, or difficulty sleeping  HEME/LYMPH: No easy bruising, or bleeding gums  All others negative    PHYSICAL EXAM:  T(C): 36.8 (04-24-22 @ 21:56), Max: 37.2 (04-24-22 @ 01:32)  HR: 98 (04-24-22 @ 21:56) (71 - 98)  BP: 129/75 (04-24-22 @ 21:56) (111/50 - 130/62)  RR: 18 (04-24-22 @ 21:56) (18 - 18)  SpO2: 92% (04-24-22 @ 21:56) (91% - 93%)  Wt(kg): --  I&O's Summary    23 Apr 2022 07:01  -  24 Apr 2022 07:00  --------------------------------------------------------  IN: 950 mL / OUT: 2250 mL / NET: -1300 mL    24 Apr 2022 07:01  -  24 Apr 2022 22:22  --------------------------------------------------------  IN: 1186 mL / OUT: 1250 mL / NET: -64 mL      EYES:   PERRLA   ENMT:   Moist mucous membranes, Good dentition, No lesions  Cardiovascular: Normal S1 S2, No JVD, No murmurs, No edema  Respiratory: b/l expiratory wheeze   Gastrointestinal:  Soft, Non-tender, + BS	  Extremities: s/p right BKA                            7.1    15.37 )-----------( 206      ( 24 Apr 2022 04:45 )             24.1     04-24    143  |  105  |  51<H>  ----------------------------<  141<H>  4.2   |  26  |  1.29    Ca    8.6      24 Apr 2022 04:45  Phos  3.7     04-24  Mg     2.1     04-24      proBNP:   Lipid Profile:   HgA1c:   TSH:     Consultant(s) Notes Reviewed:  [x ] YES  [ ] NO    Care Discussed with Consultants/Other Providers [ x] YES  [ ] NO    Imaging Personally Reviewed independently:  [x] YES  [ ] NO    All labs, radiologic studies, vitals, orders and medications list reviewed. Patient is seen and examined at bedside. Case discussed with medical team.

## 2022-04-24 NOTE — PROGRESS NOTE ADULT - SUBJECTIVE AND OBJECTIVE BOX
[facility] - ORESTES Wall / ORESTES Wynne / KARIE Melendez/ ORESTES Knight/ ORESTES Jang/ ADRIA Lee / TOÑITO Mora / ROBB Kapoor  ---------------------------------------------------------------------------------------------------------------  seen and examined today for KARLOS  Interval : serum creatinine stable  VITALS:  T(F): 97.5 (04-24-22 @ 10:38), Max: 98.9 (04-24-22 @ 01:32)  HR: 71 (04-24-22 @ 10:38)  BP: 128/61 (04-24-22 @ 10:38)  RR: 18 (04-24-22 @ 10:38)  SpO2: 91% (04-24-22 @ 10:38)  Wt(kg): --    04-23 @ 07:01  -  04-24 @ 07:00  --------------------------------------------------------  IN: 950 mL / OUT: 2250 mL / NET: -1300 mL    04-24 @ 07:01  -  04-24 @ 10:42  --------------------------------------------------------  IN: 240 mL / OUT: 0 mL / NET: 240 mL      Physical Exam :-  Constitutional: NAD  Neck: Supple.  Respiratory: Bilateral equal breath sounds,  Cardiovascular: S1, S2 normal,  Gastrointestinal: Bowel Sounds present, soft, non tender.  Extremities: Right BKA  Neurological: Alert and Oriented x 3, no focal deficits  Psychiatric: Normal mood, normal affect  Data:-  Allergies :   No Known Allergies    Hospital Medications:   MEDICATIONS  (STANDING):  acetaminophen     Tablet .. 975 milliGRAM(s) Oral every 8 hours  albuterol/ipratropium for Nebulization 3 milliLiter(s) Nebulizer every 6 hours  atorvastatin 40 milliGRAM(s) Oral at bedtime  buDESOnide    Inhalation Suspension 0.5 milliGRAM(s) Inhalation every 12 hours  finasteride 5 milliGRAM(s) Oral daily  furosemide   Injectable 40 milliGRAM(s) IV Push every 24 hours  gabapentin 600 milliGRAM(s) Oral every 8 hours  guaiFENesin ER 1200 milliGRAM(s) Oral every 12 hours  heparin  Infusion 1450 Unit(s)/Hr (18 mL/Hr) IV Continuous <Continuous>  insulin glargine Injectable (LANTUS) 20 Unit(s) SubCutaneous at bedtime  insulin lispro (ADMELOG) corrective regimen sliding scale   SubCutaneous at bedtime  insulin lispro (ADMELOG) corrective regimen sliding scale   SubCutaneous three times a day before meals  insulin lispro Injectable (ADMELOG) 7 Unit(s) SubCutaneous before breakfast  insulin lispro Injectable (ADMELOG) 5 Unit(s) SubCutaneous before lunch  insulin lispro Injectable (ADMELOG) 5 Unit(s) SubCutaneous before dinner  levothyroxine 25 MICROGram(s) Oral daily  metoprolol succinate ER 25 milliGRAM(s) Oral daily  montelukast 10 milliGRAM(s) Oral daily  multivitamin/minerals 1 Tablet(s) Oral daily  pantoprazole    Tablet 40 milliGRAM(s) Oral before breakfast  polyethylene glycol 3350 17 Gram(s) Oral daily  senna 2 Tablet(s) Oral at bedtime  tamsulosin 0.8 milliGRAM(s) Oral at bedtime    04-24    143  |  105  |  51<H>  ----------------------------<  141<H>  4.2   |  26  |  1.29    Ca    8.6      24 Apr 2022 04:45  Phos  3.7     04-24  Mg     2.1     04-24      Creatinine Trend: 1.29 <--, 1.27 <--, 1.33 <--, 1.47 <--, 1.72 <--, 1.66 <--, 1.67 <--                        7.1    15.37 )-----------( 206      ( 24 Apr 2022 04:45 )             24.1

## 2022-04-25 NOTE — CONSULT NOTE ADULT - CONSULT REQUESTED DATE/TIME
02-Apr-2022
06-Apr-2022 18:48
02-Apr-2022 15:25
07-Apr-2022 17:06
08-Apr-2022 16:06
18-Apr-2022
18-Apr-2022 16:56
25-Apr-2022 14:31
02-Apr-2022 11:57
02-Apr-2022 16:42

## 2022-04-25 NOTE — PROGRESS NOTE ADULT - SUBJECTIVE AND OBJECTIVE BOX
infectious diseases progress note:    Patient is a 87y old  Male who presents with a chief complaint of Preoperative Planning for Right above knee amputation (25 Apr 2022 01:54)        Gangrene, not elsewhere classified    Peripheral vascular disease          R     Allergies    No Known Allergies    Intolerances        ANTIBIOTICS/RELEVANT:  antimicrobials    immunologic:    OTHER:  acetaminophen     Tablet .. 975 milliGRAM(s) Oral every 8 hours  albuterol/ipratropium for Nebulization 3 milliLiter(s) Nebulizer every 6 hours  atorvastatin 40 milliGRAM(s) Oral at bedtime  buDESOnide    Inhalation Suspension 0.5 milliGRAM(s) Inhalation every 12 hours  finasteride 5 milliGRAM(s) Oral daily  furosemide   Injectable 40 milliGRAM(s) IV Push every 24 hours  gabapentin 600 milliGRAM(s) Oral every 8 hours  guaiFENesin ER 1200 milliGRAM(s) Oral every 12 hours  heparin  Infusion 1450 Unit(s)/Hr IV Continuous <Continuous>  insulin glargine Injectable (LANTUS) 20 Unit(s) SubCutaneous at bedtime  insulin lispro (ADMELOG) corrective regimen sliding scale   SubCutaneous at bedtime  insulin lispro (ADMELOG) corrective regimen sliding scale   SubCutaneous three times a day before meals  insulin lispro Injectable (ADMELOG) 7 Unit(s) SubCutaneous before breakfast  insulin lispro Injectable (ADMELOG) 5 Unit(s) SubCutaneous before lunch  insulin lispro Injectable (ADMELOG) 5 Unit(s) SubCutaneous before dinner  levothyroxine 25 MICROGram(s) Oral daily  metoprolol succinate ER 25 milliGRAM(s) Oral daily  montelukast 10 milliGRAM(s) Oral daily  multivitamin/minerals 1 Tablet(s) Oral daily  pantoprazole    Tablet 40 milliGRAM(s) Oral before breakfast  polyethylene glycol 3350 17 Gram(s) Oral daily  senna 2 Tablet(s) Oral at bedtime  tamsulosin 0.8 milliGRAM(s) Oral at bedtime  traMADol 25 milliGRAM(s) Oral every 4 hours PRN  traMADol 50 milliGRAM(s) Oral every 4 hours PRN  warfarin 5 milliGRAM(s) Oral once      Objective:  Vital Signs Last 24 Hrs  T(C): 36.6 (25 Apr 2022 05:26), Max: 36.9 (25 Apr 2022 01:11)  T(F): 97.9 (25 Apr 2022 05:26), Max: 98.4 (25 Apr 2022 01:11)  HR: 80 (25 Apr 2022 05:26) (71 - 98)  BP: 130/71 (25 Apr 2022 05:26) (114/60 - 130/71)  BP(mean): --  RR: 18 (25 Apr 2022 05:26) (18 - 18)  SpO2: 95% (25 Apr 2022 05:26) (91% - 95%)       Eyes:PETRONA, EOMI  Ear/Nose/Throat: no oral lesion, no sinus tenderness on percussion	  Neck:no JVD, no lymphadenopathy, supple  Respiratory: CTA leatha  Cardiovascular: S1S2 RRR, no murmurs  Gastrointestinal:soft, (+) BS, no HSM  Extremities:no e/e/c        LABS:                        7.3    15.28 )-----------( 180      ( 25 Apr 2022 07:29 )             24.7     04-25    145  |  106  |  53<H>  ----------------------------<  128<H>  4.0   |  26  |  1.24    Ca    8.4      25 Apr 2022 07:29  Phos  3.1     04-25  Mg     2.0     04-25      PTT - ( 24 Apr 2022 04:45 )  PTT:83.6 sec        MICROBIOLOGY:    RECENT CULTURES:        RESPIRATORY CULTURES:              RADIOLOGY & ADDITIONAL STUDIES:        Pager [phone]  After 5 pm/weekends or if no response :[phone]

## 2022-04-25 NOTE — PROGRESS NOTE ADULT - ASSESSMENT
ASSESSMENT:    86 year old gentleman, former smoker, followed by Dr. Alicja Rob of our practice for asthma/COPD overlap  syndrome and obstructive sleep apnea being treated conservatively. He has no history of TOM colonization/infection as listed in the "past medical history". He has been stable from a pulmonary perspective and maintained on budesonide and duoneb once daily and if needed. He also has a history of HTN, HLD, DM, CKD, CVA, CHF (mild systolic dysfunction) and atrial fibrillation with sick sinus syndrome s/p pacemaker implantation. He has been followed for many months for chronic foot wounds and leg pain now with some purulence and gangrene. The pain is exacerbated when laying in bed and improves with tylenol and when hanging the legs off of the bed. He is no longer able to ambulate. 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 The patient has developed marked shortness of breath with severe hypoxemia currently requiring a nasal canula @ 5lpm to maintain saturation @ 90%. He has a cough with copious mucous in his chest which he is unable to expectorate. He has chest congestion and wheeze. No fevers, chills or sweats. No chest pain/pressure or palpitations. Called by the patient's family and asked to be involved with his pulmonary care.    acute hypoxic respiratory failure -> resolved    1) severe COPD exacerbation -> slowly improving but exacerbated by ongoing aspiration  2) 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 and respiratory muscle weakness limiting diaphragmatic excursion and chest wall expansion -> bibasilar atelectasis has improved on repeat CXR  3) no evidence of pulmonary edema or pneumonia    leukocytosis more likely related to systemic steroids and physiologic stress than infection - wonder about an underlying hematologic process    steroid induced hyperglycemia    CKD/KARLOS due to diuresis in the setting of euvolemia -> improved    4/14 - remains in the ICU; continues on an insulin infusion for steroid induced hyperglycemia; worsening of his mental status and chronic left sided weakness and left facial droop after analgesics prior to the VAC change yesterday; CT scan and EEG are unremarkable; started on zosyn for possible "sepsis"; now awake and alert sitting in the chair and back to his baseline mental status; no shortness of breath or hypoxemia on room air; occasional cough productive of scant sputum; minimal chest congestion and wheeze; no fevers, chills or sweats; no chest pain/pressure or palpitations; CXR now has bibasilar atelectasis - there is no evidence of pulmonary edema; on heparin gtt for PAD    4/15 - transferred to the surgical floor; mental status is back to baseline; left facial droop and left sided weakness are no worse than usual; continues on zosyn for possible "sepsis"; awake and alert sitting in the chair; no shortness of breath or hypoxemia on room air; occasional cough productive of scant sputum; minimal chest congestion and wheeze; no fevers, chills or sweats; no chest pain/pressure or palpitations; CXR is with a small left pleural effusion and bibasilar atelectasis - there is no evidence of pulmonary edema; on heparin gtt for PAD    4/20 -  left arm swelling ->no evidence of DVT on Duplex; FEEST revealed severe dysphagia -> non oral nutrition recommended -> the family has elected to continue oral feeding understanding the risks of aspiration; mental status is back to baseline; left facial droop and left sided weakness are no worse than usual; continues on zosyn for possible "sepsis"; awake and alert sitting in the chair; no shortness of breath or hypoxemia on room air; occasional cough productive of scant sputum; mild chest congestion and wheeze especially after eating; no fevers, chills or sweats; no chest pain/pressure or palpitations; CXR reveals improved bibasilar atelectasis - there is no evidence of pulmonary edema; on heparin gtt for PAD    4/22 - NPO and off heparin gtt awaiting AKA; awake and alert sitting up in bed; no shortness of breath or hypoxemia on room air; occasional cough productive of scant sputum; chest congestion and wheeze iis much improved and exacerbated when eating; no fevers, chills or sweats; no chest pain/pressure or palpitations; decreased left arm swelling ->no evidence of DVT on Duplex    4/25 - 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; seems sedated on an increased dose of gabapentin and "as needed" ultram for ongoing leg pain; remains on a heparin gtt now being bridged to coumadin; no shortness of breath or hypoxemia on a 2lpm nasal canula; occasional weak cough productive of scant sputum; chest congestion and wheeze is much improved and exacerbated when eating;    PLAN/RECOMMENDATIONS:    stable oxygenation on a nasal canula @ 2lpm -> trial on room air  has completed a steroid taper  albuterol/atrovent nebs q6h  pulmicort 0.5mg nebs q12h - give after duoneb - rinse mouth after use  mucinex 1200mg 2 times daily  singulair 10mg @ bedtime  acapella device/incentive spirometer  speech and swallow evaluation noted - severe dysphagia - NPO with non-oral feeding recommended - I explained the ongoing risk of aspiration with possible pneumonia, worsening bronchospasm, hypoxemia, respiratory failure etc to the patient and family - they do not want placement of a NGT or PEG and wish to continue an oral diet - written for a bite sized and easy to chew diet with moderately thickened fluids - small bites - no straws - chin tuck maneuver explained and demonstrated  cardiac meds: lipitor/toprol XL/lasix - still holding cozaar and metolazone  flomax/proscar  EEG -> mild to moderate nonspecific diffuse or multifocal cerebral dysfunction -  no epileptiform patterns or seizures recorded.  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, stable in appearance compared with prior dated 9/8/2019  CXR 4/18 - improving bilateral lower lobe opacities (atelectasis)  off zosyn - ID evaluation noted - would consider a hematology evaluation for persistent leukocytosis in the absence of obvious infection and on decreasing dose of steroids  vascular surgery follow-up    heparin gtt -> bridging to coumadin (or ASA and eliquis)    pain control - gabapentin/ultram as needed - watch for sedation    s/p completion BKA today    wound care  GI prophylaxis - protonix  proscar/flomax  bowel regimen  glucose control  out of bed and into the chair  LUE Duplex is without DVT    Will follow with you. Plan of care discussed with the patient and his family at bedside and with Dr. Spaulding.    Sarabjit Wright MD, [facility]  [phone]  Pulmonary Medicine

## 2022-04-25 NOTE — PROGRESS NOTE ADULT - ASSESSMENT
86M with PMH of COPD (not on home o2), prior smoking history (40 pack years), HTN, HLD, Afib, CHF, T2DM, CVA, BPH, and PAD admitted for elective RLE AKA. Renal consulted for CKD Mx.    KARLOS on CKD 3,   baseline Cr ~1.7  serum k stable  bicarb stable  cont monitor Serum Creatinine   cont  lasix 40mg iv qd for now given lower ext edema  off losartan   monitor BMP daily and u/o   dose all meds for eGFR  avoid NSAIDs/Nephrotoxics.    Rt LE nonhealing wound:  follow up with vascular  S/P BKA      For any question, call:  Cell # [phone]  Dr Knight

## 2022-04-25 NOTE — PROGRESS NOTE ADULT - SUBJECTIVE AND OBJECTIVE BOX
Ritesh Bell MD  Interventional Cardiology / Advance Heart Failure and Cardiac Transplant Specialist  [place] Office : [phone] [place] [address] [place] [address]  Tel:   [place] Office : [address] [facility] [place] [address]  Tel: [phone]      Subjective/Overnight events: Pt is lying in bed comfortable not in distress  	  MEDICATIONS:  furosemide   Injectable 40 milliGRAM(s) IV Push every 24 hours  heparin  Infusion 1450 Unit(s)/Hr IV Continuous <Continuous>  metoprolol succinate ER 25 milliGRAM(s) Oral daily  tamsulosin 0.8 milliGRAM(s) Oral at bedtime  warfarin 5 milliGRAM(s) Oral once      albuterol/ipratropium for Nebulization 3 milliLiter(s) Nebulizer every 6 hours  buDESOnide    Inhalation Suspension 0.5 milliGRAM(s) Inhalation every 12 hours  guaiFENesin ER 1200 milliGRAM(s) Oral every 12 hours  montelukast 10 milliGRAM(s) Oral daily    acetaminophen     Tablet .. 975 milliGRAM(s) Oral every 8 hours  gabapentin 600 milliGRAM(s) Oral every 8 hours  traMADol 50 milliGRAM(s) Oral every 4 hours PRN  traMADol 25 milliGRAM(s) Oral every 4 hours PRN    pantoprazole    Tablet 40 milliGRAM(s) Oral before breakfast  polyethylene glycol 3350 17 Gram(s) Oral daily  senna 2 Tablet(s) Oral at bedtime    atorvastatin 40 milliGRAM(s) Oral at bedtime  finasteride 5 milliGRAM(s) Oral daily  insulin glargine Injectable (LANTUS) 20 Unit(s) SubCutaneous at bedtime  insulin lispro (ADMELOG) corrective regimen sliding scale   SubCutaneous three times a day before meals  insulin lispro (ADMELOG) corrective regimen sliding scale   SubCutaneous at bedtime  insulin lispro Injectable (ADMELOG) 7 Unit(s) SubCutaneous before breakfast  insulin lispro Injectable (ADMELOG) 5 Unit(s) SubCutaneous before lunch  insulin lispro Injectable (ADMELOG) 5 Unit(s) SubCutaneous before dinner  levothyroxine 25 MICROGram(s) Oral daily    multivitamin/minerals 1 Tablet(s) Oral daily      PAST MEDICAL/SURGICAL HISTORY  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    Deep Vein Thrombosis (DVT)  17 yeras ago on Coumadin    CHF (congestive heart failure)  last exacerbation in 1/2017    Enlarged prostate    GERD (gastroesophageal reflux disease)    Hernia  umblical    Calculus of bile duct without cholangitis or cholecystitis without obstruction    Atrial fibrillation    S/P Hernia Repair    S/P cataract surgery, unspecified laterality    S/P ERCP  3/2017        SOCIAL HISTORY: Substance Use (street drugs): ( x ) never used  (  ) other:    FAMILY HISTORY:          PHYSICAL EXAM:  T(C): 36.6 (04-25-22 @ 09:32), Max: 36.9 (04-25-22 @ 01:11)  HR: 92 (04-25-22 @ 09:32) (71 - 98)  BP: 118/73 (04-25-22 @ 09:32) (114/60 - 130/71)  RR: 19 (04-25-22 @ 09:32) (18 - 19)  SpO2: 93% (04-25-22 @ 09:32) (91% - 95%)  Wt(kg): --  I&O's Summary    24 Apr 2022 07:01  -  25 Apr 2022 07:00  --------------------------------------------------------  IN: 1744 mL / OUT: 2000 mL / NET: -256 mL          EYES:   PERRLA   ENMT:   Moist mucous membranes, Good dentition, No lesions  Cardiovascular: Normal S1 S2, No JVD, No murmurs, No edema  Respiratory: b/l expiratory wheeze   Gastrointestinal:  Soft, Non-tender, + BS	  Extremities: s/p right BKA                                7.3    15.28 )-----------( 180      ( 25 Apr 2022 07:29 )             24.7     04-25    145  |  106  |  53<H>  ----------------------------<  128<H>  4.0   |  26  |  1.24    Ca    8.4      25 Apr 2022 07:29  Phos  3.1     04-25  Mg     2.0     04-25      proBNP:   Lipid Profile:   HgA1c:   TSH:     Consultant(s) Notes Reviewed:  [x ] YES  [ ] NO    Care Discussed with Consultants/Other Providers [ x] YES  [ ] NO    Imaging Personally Reviewed independently:  [x] YES  [ ] NO    All labs, radiologic studies, vitals, orders and medications list reviewed. Patient is seen and examined at bedside. Case discussed with medical team.

## 2022-04-25 NOTE — CHART NOTE - NSCHARTNOTEFT_GEN_A_CORE
Diabetes Follow up note: Chart note-non-billable visit    Chief complaint: T2DM    Interval Hx: BG values at goal over past 3 days.     MEDS:  atorvastatin 40 milliGRAM(s) Oral at bedtime  finasteride 5 milliGRAM(s) Oral daily  insulin glargine Injectable (LANTUS) 20 Unit(s) SubCutaneous at bedtime  insulin lispro (ADMELOG) corrective regimen sliding scale   SubCutaneous three times a day before meals  insulin lispro (ADMELOG) corrective regimen sliding scale   SubCutaneous at bedtime  insulin lispro Injectable (ADMELOG) 7 Unit(s) SubCutaneous before breakfast  insulin lispro Injectable (ADMELOG) 5 Unit(s) SubCutaneous before lunch  insulin lispro Injectable (ADMELOG) 5 Unit(s) SubCutaneous before dinner  levothyroxine 25 MICROGram(s) Oral daily      Allergies    No Known Allergies        LABS:  POCT Blood Glucose.: 114 mg/dL (04-25-22 @ 12:26)  POCT Blood Glucose.: 150 mg/dL (04-25-22 @ 08:26)  POCT Blood Glucose.: 208 mg/dL (04-24-22 @ 21:39)  POCT Blood Glucose.: 181 mg/dL (04-24-22 @ 18:42)  POCT Blood Glucose.: 143 mg/dL (04-24-22 @ 13:32)  POCT Blood Glucose.: 150 mg/dL (04-24-22 @ 08:33)  POCT Blood Glucose.: 113 mg/dL (04-23-22 @ 21:31)  POCT Blood Glucose.: 125 mg/dL (04-23-22 @ 17:51)  POCT Blood Glucose.: 135 mg/dL (04-23-22 @ 12:13)  POCT Blood Glucose.: 130 mg/dL (04-23-22 @ 08:29)  POCT Blood Glucose.: 181 mg/dL (04-22-22 @ 21:11)  POCT Blood Glucose.: 102 mg/dL (04-22-22 @ 17:53)  POCT Blood Glucose.: 111 mg/dL (04-22-22 @ 15:54)                            7.3    15.28 )-----------( 180      ( 25 Apr 2022 07:29 )             24.7       04-25    145  |  106  |  53<H>  ----------------------------<  128<H>  4.0   |  26  |  1.24    Ca    8.4      25 Apr 2022 07:29  Phos  3.1     04-25  Mg     2.0     04-25        A1C with Estimated Average Glucose Result: 6.8 % (04-02-22 @ 12:21)  A1C with Estimated Average Glucose Result: 7.4 % (09-03-21 @ 19:03)      Plan:  T2DM:  Pt now off Prednisone taper. Adjust insulin as follows.   -test BG AC/HS  -Decrease Lantus 15 units QHS  -Decrease Admelog 5-3-3 w/meals  -change Admelog to low correction scale AC and low HS scale  -cons carb diet  Outpt. endo follow-up  Outpt. optho, podiatry, nephrology  Plan to d/c on basal + orals including SGLT2 given CKD. Would avoid metformin and sulfonylurea.      Can be reached via TEAMS/pager: [phone]   office:  [phone] (M-F 9a-5pm)               [phone] (nights/weekends)   [email or web address] password [email or web address]

## 2022-04-25 NOTE — PROGRESS NOTE ADULT - SUBJECTIVE AND OBJECTIVE BOX
Patient seen and examined  no complaints    No Known Allergies    Hospital Medications:   MEDICATIONS  (STANDING):  acetaminophen     Tablet .. 975 milliGRAM(s) Oral every 8 hours  albuterol/ipratropium for Nebulization 3 milliLiter(s) Nebulizer every 6 hours  atorvastatin 40 milliGRAM(s) Oral at bedtime  buDESOnide    Inhalation Suspension 0.5 milliGRAM(s) Inhalation every 12 hours  finasteride 5 milliGRAM(s) Oral daily  furosemide   Injectable 40 milliGRAM(s) IV Push every 24 hours  gabapentin 600 milliGRAM(s) Oral every 8 hours  guaiFENesin ER 1200 milliGRAM(s) Oral every 12 hours  heparin  Infusion 1450 Unit(s)/Hr (18 mL/Hr) IV Continuous <Continuous>  insulin glargine Injectable (LANTUS) 15 Unit(s) SubCutaneous at bedtime  insulin lispro (ADMELOG) corrective regimen sliding scale   SubCutaneous at bedtime  insulin lispro (ADMELOG) corrective regimen sliding scale   SubCutaneous three times a day before meals  insulin lispro Injectable (ADMELOG) 3 Unit(s) SubCutaneous before dinner  levothyroxine 25 MICROGram(s) Oral daily  metoprolol succinate ER 25 milliGRAM(s) Oral daily  montelukast 10 milliGRAM(s) Oral daily  multivitamin/minerals 1 Tablet(s) Oral daily  pantoprazole    Tablet 40 milliGRAM(s) Oral before breakfast  polyethylene glycol 3350 17 Gram(s) Oral daily  senna 2 Tablet(s) Oral at bedtime  tamsulosin 0.8 milliGRAM(s) Oral at bedtime  warfarin 5 milliGRAM(s) Oral once      VITALS:  T(F): 98.2 (04-25-22 @ 13:05), Max: 98.4 (04-25-22 @ 01:11)  HR: 77 (04-25-22 @ 13:05)  BP: 130/70 (04-25-22 @ 13:05)  RR: 18 (04-25-22 @ 13:05)  SpO2: 96% (04-25-22 @ 13:05)  Wt(kg): --    04-24 @ 07:01  -  04-25 @ 07:00  --------------------------------------------------------  IN: 1744 mL / OUT: 2000 mL / NET: -256 mL    04-25 @ 07:01  -  04-25 @ 15:53  --------------------------------------------------------  IN: 240 mL / OUT: 0 mL / NET: 240 mL        Physical Exam :-  Constitutional: NAD  Neck: Supple.  Respiratory: Bilateral equal breath sounds,  Cardiovascular: S1, S2 normal,  Gastrointestinal: Bowel Sounds present, soft, non tender.  Extremities: Right BKA    LABS:  04-25    145  |  106  |  53<H>  ----------------------------<  128<H>  4.0   |  26  |  1.24    Ca    8.4      25 Apr 2022 07:29  Phos  3.1     04-25  Mg     2.0     04-25      Creatinine Trend: 1.24 <--, 1.29 <--, 1.27 <--, 1.33 <--, 1.47 <--, 1.72 <--, 1.66 <--                        7.3    15.28 )-----------( 180      ( 25 Apr 2022 07:29 )             24.7     Urine Studies:      RADIOLOGY & ADDITIONAL STUDIES:

## 2022-04-25 NOTE — CONSULT NOTE ADULT - REASON FOR ADMISSION
Preoperative Planning for Right above knee amputation
right foot gangrene s/p BKA and awaiting AKA

## 2022-04-25 NOTE — PROGRESS NOTE ADULT - SUBJECTIVE AND OBJECTIVE BOX
[facility] - PROGRESS NOTE    CHIEF COMPLAINT: acute hypoxic respiratory failure; COPD exacerbation; mucous plugging; atelectasis; weak cough; pleural effusion; dysphagia; right foot gangrene s/p BKA    INTERVAL HISTORY: s/p completion right BKA ; seems sedated on an increased dose of gabapentin and "as needed" ultram for ongoing leg pain; remains on a heparin gtt now being bridged to coumadin; no shortness of breath or hypoxemia on a 2lpm nasal canula; occasional weak cough productive of scant sputum; chest congestion and wheeze is much improved and exacerbated when eating; no fevers, chills or sweats; no chest pain/pressure or palpitations; FEEST revealed severe dysphagia -> non oral nutrition recommended -> the family has elected to continue oral feeding understanding the risks of aspiration (small bites and easy to chew diet with moderately thickened fluids ordered); mental status is back to baseline; left facial droop and left sided weakness are no worse than usual;       REVIEW OF SYSTEMS:  Constitutional: As per interval history  HEENT: Within normal limits  CV: As per interval history  Resp: As per interval history  GI: Within normal limits   : Within normal limits  Musculoskeletal: Within normal limits  Skin: Within normal limits  Neurological: Within normal limits  Psychiatric: Within normal limits  Endocrine: Within normal limits  Hematologic/Lymphatic: Within normal limits  Allergic/Immunologic: Within normal limits    MEDICATIONS:     Pulmonary "  albuterol/ipratropium for Nebulization 3 milliLiter(s) Nebulizer every 6 hours  buDESOnide    Inhalation Suspension 0.5 milliGRAM(s) Inhalation every 12 hours  guaiFENesin ER 1200 milliGRAM(s) Oral every 12 hours  montelukast 10 milliGRAM(s) Oral daily    Anti-microbials:    Cardiovascular:  furosemide   Injectable 40 milliGRAM(s) IV Push every 24 hours  metoprolol succinate ER 25 milliGRAM(s) Oral daily  tamsulosin 0.8 milliGRAM(s) Oral at bedtime    Other:  acetaminophen     Tablet .. 975 milliGRAM(s) Oral every 8 hours  atorvastatin 40 milliGRAM(s) Oral at bedtime  finasteride 5 milliGRAM(s) Oral daily  gabapentin 600 milliGRAM(s) Oral every 8 hours  heparin  Infusion 1450 Unit(s)/Hr IV Continuous <Continuous>  insulin glargine Injectable (LANTUS) 15 Unit(s) SubCutaneous at bedtime  insulin lispro (ADMELOG) corrective regimen sliding scale   SubCutaneous at bedtime  insulin lispro (ADMELOG) corrective regimen sliding scale   SubCutaneous three times a day before meals  insulin lispro Injectable (ADMELOG) 3 Unit(s) SubCutaneous before dinner  levothyroxine 25 MICROGram(s) Oral daily  multivitamin/minerals 1 Tablet(s) Oral daily  pantoprazole    Tablet 40 milliGRAM(s) Oral before breakfast  polyethylene glycol 3350 17 Gram(s) Oral daily  senna 2 Tablet(s) Oral at bedtime  warfarin 5 milliGRAM(s) Oral once    MEDICATIONS  (PRN):  traMADol 25 milliGRAM(s) Oral every 4 hours PRN Moderate Pain (4 - 6)  traMADol 50 milliGRAM(s) Oral every 4 hours PRN Severe Pain (7 - 10)        OBJECTIVE:    POCT Blood Glucose.: 114 mg/dL (2022 12:26)  POCT Blood Glucose.: 150 mg/dL (2022 08:26)  POCT Blood Glucose.: 208 mg/dL (2022 21:39)  POCT Blood Glucose.: 181 mg/dL (2022 18:42)      PHYSICAL EXAM:       ICU Vital Signs Last 24 Hrs  T(C): 37 (2022 16:05), Max: 37 (2022 16:05)  T(F): 98.6 (2022 16:05), Max: 98.6 (2022 16:05)  HR: 69 (2022 16:05) (69 - 98)  BP: 129/70 (2022 16:05) (114/60 - 130/71)  BP(mean): --  ABP: --  ABP(mean): --  RR: 18 (2022 16:05) (18 - 19)  SpO2: 97% (2022 16:05) (92% - 97%) on 2lpm nasal canula     General: Awake. Alert. Mildly sedated. Cooperative. No distress. Appears stated age. Obese. Sitting up in bed  HEENT: Atraumatic. Normocephalic. Anicteric. Normal oral mucosa. PERRL. EOMI.  Neck: Supple. Trachea midline. Thyroid without enlargement/tenderness/nodules. No carotid bruit. No JVD.	  Cardiovascular: Regular rate and rhythm. Distant S1 S2. No murmurs, rubs or gallops.  Respiratory: Respirations unlabored. Minimal bilateral rhonchi and wheeze. No curvature.  Abdomen: Soft. Non-tender. Non-distended. No organomegaly. No masses. Normal bowel sounds.  Extremities: Warm to touch. No clubbing or cyanosis. No pedal edema. Right BKA bandaged. Decreased swelling and pitting edema of the left upper extremity  Pulses: Decreased peripheral pulses LLE  Skin: Venous stasis changes left lower extremity  Lymph Nodes: Cervical, supraclavicular and axillary nodes normal  Neurological: Left sided weakness left > arm. Left facial droop. A and O x 3  Psychiatry: Appropriate mood and affect.    LABS:                          7.3    15.28 )-----------( 180      ( 2022 07:29 )             24.7     CBC    WBC  15.28 <==, 15.37 <==, 18.18 <==, 21.20 <==, 22.61 <==, 21.90 <==, 22.85 <==    Hemoglobin  7.3 <<==, 7.1 <<==, 7.8 <<==, 8.0 <<==, 8.1 <<==, 7.8 <<==, 7.8 <<==    Hematocrit  24.7 <==, 24.1 <==, 26.7 <==, 26.4 <==, 27.2 <==, 26.6 <==, 26.4 <==    Platelets  180 <==, 206 <==, 256 <==, 250 <==, 277 <==, 279 <==, 298 <==      145  |  106  |  53<H>  ----------------------------<  128<H>    04-25  4.0   |  26  |  1.24      LYTES    sodium  145 <==, 143 <==, 143 <==, 141 <==, 146 <==, 143 <==, 144 <==    potassium   4.0 <==, 4.2 <==, 4.4 <==, 4.1 <==, 4.3 <==, 4.2 <==, 4.3 <==    chloride  106 <==, 105 <==, 106 <==, 105 <==, 109 <==, 107 <==, 107 <==    carbon dioxide  26 <==, 26 <==, 24 <==, 23 <==, 22 <==, 22 <==, 22 <==    =============================================================================================  RENAL FUNCTION:    Creatinine:   1.24  <<==, 1.29  <<==, 1.27  <<==, 1.33  <<==, 1.47  <<==, 1.72  <<==, 1.66  <<==    BUN:   53 <==, 51 <==, 53 <==, 64 <==, 70 <==, 79 <==, 82 <==    ============================================================================================    calcium   8.4 <==, 8.6 <==, 8.8 <==, 8.7 <==, 8.6 <==, 8.4 <==, 8.6 <==    phos   3.1 <==, 3.7 <==, 4.2 <==, 3.8 <==, 4.2 <==, 4.7 <==, 4.7 <==    mag   2.0 <==, 2.1 <==, 2.2 <==, 2.2 <==, 2.3 <==, 2.4 <==, 2.3 <==    ============================================================================================  PT/INR - ( 2022 07:29 )   PT: 13.5 sec;   INR: 1.16 ratio       PTT - ( 2022 07:29 )  PTT: 59.1 sec    ABG - ( 2022 18:43 )  pH: 7.43  /  pCO2: 35    /  pO2: 80    / HCO3: 23    / Base Excess: -0.8  /  SaO2: 99.0      Venous Blood Gas:   @ 22:10  7.40/41/52/25/84.4  VBG Lactate: 1.8    Venous Blood Gas:   @ 22:23  7.40/39/45/24/78.1  VBG Lactate: 2.4    Venous Blood Gas:   @ 22:23  7.40/39/45/24/78.1  VBG Lactate: 2.4    Venous Blood Gas:  04-10 @ 23:19  7.39/42/44/25/72.2  VBG Lactate: 2.5    < from: TTE with Doppler (w/Cont) (22 @ 15:07) >    Patient name: Martir Heart  YOB: 1935   Age: 86 (M)   MR#: [id number]  Study Date: 4/3/2022  Location: [address] [place], [place] [id number]: Angie Olivarez RDCS  Study quality: Technically difficult  Referring Physician: Anmol Quinn MD  BloodPressure: 115/70 mmHg  Height: 173 cm  Weight: 92 kg  BSA: 2.1 m2  ------------------------------------------------------------------------  PROCEDURE: Transthoracic echocardiogram with 2-D, M-Mode  and complete spectral and color flow Doppler. Verbal  consent was obtained for injection of  Ultrasonic Enhancing  Agent following a discussion of risks and benefits.  Following intravenous injection of Ultrasonic Enhancing  Agent, harmonic imaging was performed.  INDICATION: Cardiomyopathy, unspecified (I42.9)  ------------------------------------------------------------------------  Dimensions:    Normal Values:  LA:     3.1    2.0 - 4.0 cm  Ao:     3.5    2.0 - 3.8 cm  SEPTUM: 1.1    0.6 - 1.2 cm  PWT:    1.3    0.6 - 1.1 cm  LVIDd:  4.3    3.0- 5.6 cm  LVIDs:  3.2    1.8 - 4.0 cm  Derived variables:  LVMI: 90 g/m2  RWT: 0.60  Fractional short: 26 %  EF (Visual Estimate): NWV %  Doppler Peak Velocity (m/sec): MV=1.6 AoV=1.4  ------------------------------------------------------------------------  Conclusions:  1. Aortic valve not well visualized; appears calcified.  Peak transaortic valve gradient equals 8 mm Hg, mean  transaortic valve gradient equals 3 mm Hg, estimated aortic  valve area equals 2 sqcm (by continuity equation), aortic  valve velocity time integral equals 22 cm, consistent with  mild aortic stenosis.  2. Endocardial visualization enhanced with intravenous  injection of Ultrasonic Enhancing Agent (Definity). Mild  left ventricular systolic dysfunction. The inferior wall,  and the inferoseptum are hypokinetic.  3. The right ventricle is not well visualized; grossly  normal right ventricular systolic function.  ------------------------------------------------------------------------  Confirmed on  4/3/2022 - 17:12:14 by BRITTANY Giraldo  ------------------------------------------------------------------------  -----------------------------------------------------------  MICROBIOLOGY:     COVID-19 PCR . (22 @ 18:23)   COVID-19 PCR: NotDetec:     Urinalysis Basic - ( 2022 06:05 )    Color: Light Yellow / Appearance: Clear / S.021 / pH: x  Gluc: x / Ketone: Negative  / Bili: Negative / Urobili: Negative   Blood: x / Protein: Trace / Nitrite: Negative   Leuk Esterase: Moderate / RBC: 185 /hpf / WBC 12 /HPF   Sq Epi: x / Non Sq Epi: 2 /hpf / Bacteria: Negative    Culture - Blood (22 @ 11:16)   Specimen Source: .Blood Blood-Peripheral   Culture Results:   No growth to date.     Culture - Blood (22 @ 11:16)   Specimen Source: .Blood Blood-Peripheral   Culture Results:   No growth to date.     RADIOLOGY:  [x] Chest radiographs reviewed and interpreted by me    EXAM:  DUPLEX EXT VEINS UPPER LT                          PROCEDURE DATE:  2022      IMPRESSION:  No evidence of left upper extremity deep venous thrombosis.    NARCISO MUÑOZ MD; Attending Radiologist  This document has been electronically signed. 2022  8:48PM  ---------------------------------------------------------------------------------------------------------------  EXAM:  XR CHEST PORTABLE URGENT 1V                          PROCEDURE DATE:  2022      FINDINGS:  Left chest wall pacemaker.  The heart size cannot be accurately evaluated on this projection.  Bilateral lower lung hazy opacities, reduced since 2022.  There is no pneumothorax.    IMPRESSION:  Partial clearing of the bases and left effusion.    ANITA INFANTE MD; Resident Radiologist  This document has been electronically signed.  HORACIO HELMS MD; Attending Interventional Radiologist  Thisdocument has been electronically signed. 2022  3:57PM  --------------------------------------------------------------------------------------------------------------  EXAM:  CT BRAIN                          PROCEDURE DATE:  2022      FINDINGS:    Prominence of ventricles and subarachnoid spaces, compatible with   atrophy. Periventricular small vessel white matter ischemic changes.   Encephalomalacia and gliosis is appreciated bilaterally compatible with   old regions of infarction, noted in a high left posterior frontal   location, right posterior parietal/occipital location, and right   cerebellar location. Old ischemic event is also appreciated along the   anterior limb of the internal capsule on the left and along the external   capsular region on the right.    There is prominence of the bifrontal extra-axial regions, suggestive of   bifrontal subdural hygromas, more prominent on the left than the   right-stable compared with prior. Prominent deposition of calcified   plaque within the bilateral carotid siphons, as on prior.    Deposition of calcium appreciated within the bilateral basal ganglia, as   on prior.    No acute intracranial hemorrhage. No intraparenchymal mass lesion, mass   effect, or midline shift.    Appearance of the bilateral optic lenses suggests the patient has   previously undergone prior cataract surgery.    Imaged paranasal sinuses, bilateral mastoid air cells, middle ear   cavities are clear.    IMPRESSION:  1. No acute intracranial hemorrhage.    2. 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 No evidence of an acute ischemic event.    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    DAWN BEHR-VENTURA MD; Attending Radiologist  This document has been electronically signed. 2022  8:25PM  ---------------------------------------------------------------------------------------------------------------   EXAM:  XR CHEST PORTABLE ROUTINE 1V                          PROCEDURE DATE:  2022      FINDINGS:    Support devices: A pacemaker overlies the left chest wall with its leads   intact.    Cardiac/mediastinum/hilum: Heart size cannot be accurately assessed on   this projection.    Lung parenchyma/Pleura: Right lower lung hazy opacities. Bibasilar   atelectasis. Trace left pleural effusion, unchanged. There is no   pneumothorax.    Skeleton/soft tissues: No acute osseous abnormalities.    IMPRESSION:    Right lower lung hazy opacities, which may represent atelectasis versus   infection.    Unchanged trace left pleural effusion.    EDITH HER MD; Resident Radiologist  This document has been electronically signed.  EVON MAN MD; Attending Radiologist  This document has been electronically signed. 2022  5:11PM  ---------------------------------------------------------------------------------------------------------------  EXAM:  XR CHEST PORTABLE ROUTINE 1V                          PROCEDURE DATE:  04/10/2022      FINDINGS:  Left pacemaker with leads in the right atrium and ventricle.    The heart size is not accurately assessed on this projection.  Bilateral lower lung field patchy opacities, left greater than right.  There is no pneumothorax. Trace left pleural effusion.    IMPRESSION:  Bilateral patchy opacities, left greater than right, differential   includes atelectasis or infection.    JAYDON MONTEMAYOR MD; Resident Radiologist  This document has been electronically signed.  SATURNINO CHERRY MD; Attending Radiologist  This document has been electronically signed. Apr 10 2022  9:14AM  --------------------------------------------------------------------------------------------  EXAM:  XR CHEST PORTABLE URGENT 1V                          PROCEDURE DATE:  2022      FINDINGS:  Left chest wall dual-lead pacemaker. Rotated exam limits assessment for   lead tip.    Small left pleural effusion with adjacent opacity. No pneumothorax.    Cardiac size cannot accurately be assessed in this projection.  Aortic   calcifications.      IMPRESSION: Small left pleural effusion with adjacent atelectasis   obscuring evaluation of the underlying lung.    DRE SANCHEZ MD; Resident Radiology  This document has been electronically signed.  WAGNER LAM MD; Attending Radiologist  This document has been electronically signed. 2022  5:54PM  ---------------------------------------------------------------------------------------------------------------  EXAM:  XR CHEST PORTABLE URGENT 1V                          PROCEDURE DATE:  2022      FINDINGS:  Left chest wall dual-lead pacemaker.  The heart is normal in size.  The lungs are clear.  There is no pneumothorax or pleural effusion.    IMPRESSION:  Clear lungs.    KENA CARRINGTON MD; Resident Radiologist  This document has been electronically signed.  SATURNINO CHERRY MD; Attending Radiologist  This document has been electronically signed. 2022 11:40AM  ---------------------------------------------------------------------------------------------------------------  EXAM:  XR CHEST PORTABLE URGENT 1V                          PROCEDURE DATE:  2022      FINDINGS:    Appropriate course of dual-chamber pacemaker. Unremarkable   cardiomediastinal silhouette. Minimal left basilar atelectasis. No   pleural effusion or pneumothorax.      IMPRESSION:    Mild left basilar atelectasis.    JOSEPH GAMINO M.D., ATTENDING RADIOGIST  This document has been electronically signed. Apr  3 2022  9:00AM  ---------------------------------------------------------------------------------------------------------------

## 2022-04-25 NOTE — PROGRESS NOTE ADULT - ASSESSMENT
86 y/o male pt with left heel discoloration   - pt seen and evaluated  - discoloration, changes to skin of left heel secondary to ischemic changes   - rec cont with z flow boot in bed at all times  - leave open to air at this time  - will monitor periodically for any signs of worsening during this admission

## 2022-04-25 NOTE — PROGRESS NOTE ADULT - ASSESSMENT
87 yo male ex  smoker, h/o HTN, HLD, DM, CKD, CVA, CHF (mild systolic dysfunction) and atrial fibrillation with sick sinus syndrome s/p pacemaker implantation.  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   podiatry following left heel discoloration, wearing z flow boots in bed  on heparin gtt for PAD, transition to coumadin as per surgery  s/p COPD exacerbation. now stable  prednisone taper completed  albuterol/atrovent nebs   pulmicort 0.5mg nebs q12h   mucinex 1200mg 2 times daily  singulair 10mg @ bedtime  acapella device/incentive spirometer  on RA 91-92% pulse Ox  steroid induced hyperglycemia, now off insulin drip, on Lantus and prandial Admelog  CKD3/s/p KARLOS due to diuresis in the setting of euvolemia -> improved, renal following  HTN, systolic CHF, Afib stable, cardiology following

## 2022-04-25 NOTE — PROGRESS NOTE ADULT - ASSESSMENT
86M PMH HTN, HLD, DM2 and nonhealing wounds of RLE who is non-ambulatory at home now s/p right guillotine BELOW knee amputation. Postoperative course c/b severe COPD exacerbation requiring excalation of O2 supplementation with HFNC. Now s/p R BKA completion 4/22.     - Dressing change today  - Pain control   - Continue hep gtt   - CC Regs  - ISS  - Per podiatry re: left heel discoloration- cont with z flow boot in bed at all times, leave open to air  - Per cards: post op ok to transition to coumadin or Eliquis AND aspirin  - Home meds     Vascular Surgery  [id number]

## 2022-04-25 NOTE — CONSULT NOTE ADULT - PROVIDER SPECIALTY LIST ADULT
Internal Medicine
SICU
Nephrology
Pulmonology
Endocrinology
Infectious Disease
Pulmonology
Rehab Medicine
Podiatry
Cardiology

## 2022-04-25 NOTE — CHART NOTE - NSCHARTNOTEFT_GEN_A_CORE
Pt seen at bedside. Has not urinated since removal of hudson. Bladder scan performed, retained 552 ml. Will straight cath x1. Will follow.    ORESTES Barry, PGY-1  Vascular Surgery  [id number]

## 2022-04-25 NOTE — PROGRESS NOTE ADULT - ASSESSMENT
86 year old gentleman with a PMH DM, HTN, HLD who has been followed for the past 6 months for foot wounds and leg pain.      EKG -  A sense V paced PVC's  Echo - Mild LV dysfunction mild aortic stenosis    1) Post-op assessment   -pt has h/o moderate LV dysfunction as per echo 2017, no chest pains 2d echo now shows mild LV dysfunction  -12 lead EKG ok,  PPM interrogated  -s/p BKA     2) HTN  -controlled  -c/w metoprolol  -continue to monitor BP    3) Chronic systolic CHF   - hold metolazone   -c/w IV lasix 40mg daily. monitor I&Os    4) ?Atrial fib   -c/w hep to coumadin bridge   -monitor INR     5) KARLOS  -improving  -continue to hold losartan and metolazone  -f/u renal

## 2022-04-25 NOTE — CONSULT NOTE ADULT - SUBJECTIVE AND OBJECTIVE BOX
HPI: Mr. Heart is an 86 year old gentleman with a PMH DM, HTN, HLD who has been followed for the past 6 months for foot wounds and leg pain. He underwent and angiogram with Dr. Quinn in september of 2021 and has been followed in the office. He reports having pain in the RLE mostly in the toes and has open wounds with some purulence and gangrene. The pain is worse at night when lying in bed, and improves with tylenol and dangling the foot off the bed. He is non-ambulatory at home.  He presents today from the office for preoperative planning and optimization for a RLE AKA on Monday 4/4 with Dr. Quinn.     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 Patient seen today, denies pain.     REVIEW OF SYSTEMS  Constitutional - No fever, No weight loss, No fatigue  HEENT - No eye pain, No visual disturbances, No difficulty hearing, No tinnitus, No vertigo, No neck pain  Respiratory - No cough, No wheezing, No shortness of breath  Cardiovascular - No chest pain, No palpitations  Gastrointestinal - No abdominal pain, No nausea, No vomiting, No diarrhea, No constipation  Genitourinary - No dysuria, No frequency, No hematuria, No incontinence  Psychiatric - No depression, No anxiety    VITALS  T(C): 36.8 (04-25-22 @ 13:05), Max: 36.9 (04-25-22 @ 01:11)  HR: 77 (04-25-22 @ 13:05) (77 - 98)  BP: 130/70 (04-25-22 @ 13:05) (114/60 - 130/71)  RR: 18 (04-25-22 @ 13:05) (18 - 19)  SpO2: 96% (04-25-22 @ 13:05) (92% - 96%)  Wt(kg): --    PAST MEDICAL & SURGICAL HISTORY  Diabetes Mellitus    Hypertension    CVA (Cerebral Vascular Accident)    Chronic Obstructive Pulmonary Disease (COPD)    Obstructive Sleep Apnea    Mycobacterium Avium-Intracellulare Infection    Deep Vein Thrombosis (DVT)    CHF (congestive heart failure)    Enlarged prostate    GERD (gastroesophageal reflux disease)    Hernia    Calculus of bile duct without cholangitis or cholecystitis without obstruction    Atrial fibrillation    S/P Hernia Repair    S/P cataract surgery, unspecified laterality    S/P ERCP        SOCIAL HISTORY  Smoking - Denied  EtOH - Denied   Drugs - Denied    FUNCTIONAL HISTORY  Lives with family  needs assist with ADLs, transfers to chair     CURRENT FUNCTIONAL STATUS  4/24 SLP  oropharyngeal dysphagia  NPO  patient and family accepting risk of PO diet     4/23 OT  bed mobility max assist x 2      4/19 PT  sitting EOB x 10 min  lateral left lean  [facility]     FAMILY HISTORY   No pertinent family history in first degree relatives    Family hx of lung cancer      RECENT LABS/IMAGING  CBC Full  -  ( 25 Apr 2022 07:29 )  WBC Count : 15.28 K/uL  RBC Count : 2.92 M/uL  Hemoglobin : 7.3 g/dL  Hematocrit : 24.7 %  Platelet Count - Automated : 180 K/uL  Mean Cell Volume : 84.6 fl  Mean Cell Hemoglobin : 25.0 pg  Mean Cell Hemoglobin Concentration : 29.6 gm/dL  Auto Neutrophil # : x  Auto Lymphocyte # : x  Auto Monocyte # : x  Auto Eosinophil # : x  Auto Basophil # : x  Auto Neutrophil % : x  Auto Lymphocyte % : x  Auto Monocyte % : x  Auto Eosinophil % : x  Auto Basophil % : x    04-25    145  |  106  |  53<H>  ----------------------------<  128<H>  4.0   |  26  |  1.24    Ca    8.4      25 Apr 2022 07:29  Phos  3.1     04-25  Mg     2.0     04-25    < from: CT Head No Cont (04.13.22 @ 19:54) >    IMPRESSION:  1. No acute intracranial hemorrhage.    2. 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 No evidence of an acute ischemic event.    3.Bifrontal subdural hygromas, more prominent on the left than the   right, stable in appearance compared with prior dated 9/8/2019.      < end of copied text >        ALLERGIES  No Known Allergies      MEDICATIONS   acetaminophen     Tablet .. 975 milliGRAM(s) Oral every 8 hours  albuterol/ipratropium for Nebulization 3 milliLiter(s) Nebulizer every 6 hours  atorvastatin 40 milliGRAM(s) Oral at bedtime  buDESOnide    Inhalation Suspension 0.5 milliGRAM(s) Inhalation every 12 hours  finasteride 5 milliGRAM(s) Oral daily  furosemide   Injectable 40 milliGRAM(s) IV Push every 24 hours  gabapentin 600 milliGRAM(s) Oral every 8 hours  guaiFENesin ER 1200 milliGRAM(s) Oral every 12 hours  heparin  Infusion 1450 Unit(s)/Hr IV Continuous <Continuous>  insulin glargine Injectable (LANTUS) 20 Unit(s) SubCutaneous at bedtime  insulin lispro (ADMELOG) corrective regimen sliding scale   SubCutaneous at bedtime  insulin lispro (ADMELOG) corrective regimen sliding scale   SubCutaneous three times a day before meals  insulin lispro Injectable (ADMELOG) 7 Unit(s) SubCutaneous before breakfast  insulin lispro Injectable (ADMELOG) 5 Unit(s) SubCutaneous before lunch  insulin lispro Injectable (ADMELOG) 5 Unit(s) SubCutaneous before dinner  levothyroxine 25 MICROGram(s) Oral daily  metoprolol succinate ER 25 milliGRAM(s) Oral daily  montelukast 10 milliGRAM(s) Oral daily  multivitamin/minerals 1 Tablet(s) Oral daily  pantoprazole    Tablet 40 milliGRAM(s) Oral before breakfast  polyethylene glycol 3350 17 Gram(s) Oral daily  senna 2 Tablet(s) Oral at bedtime  tamsulosin 0.8 milliGRAM(s) Oral at bedtime  traMADol 25 milliGRAM(s) Oral every 4 hours PRN  traMADol 50 milliGRAM(s) Oral every 4 hours PRN  warfarin 5 milliGRAM(s) Oral once      ----------------------------------------------------------------------------------------  PHYSICAL EXAM  Constitutional - NAD, Comfortable, in bed   Chest - Breathing comfortably, room air   Cardiovascular - S1S2   Abdomen - Soft   Extremities - right BKA, in knee immobilizer   Neurologic Exam -                    Cognitive - Awake, Alert, AAO to self, place, date, year, situation     Communication - Fluent, No dysarthria        Motor - general weakness, 3/5      Sensory - Intact to LT     Psychiatric - Mood stable, Affect WNL  ----------------------------------------------------------------------------------------  ASSESSMENT/PLAN  87yMale h/o PVD, COPD with functional deficits after right BKA  COPD exacerbation, steroid taper   left heel discoloration, zflow boot in bed   dysphagia, NPO, patient and family refusing PEG   Pain - Tylenol, tramadol, gabapentin   DVT PPX - SCDs, heparin gtt   Rehab - Will continue to follow for ongoing rehab needs and recommendations.   continue bedside therapy  out of bed to chair daily  patient would benefit from VAUGHN, requires max assist with bed mobility, would not be able to tolerate three hours of therapy daily and needs a longer period of inpatient rehabilitation

## 2022-04-25 NOTE — PROGRESS NOTE ADULT - SUBJECTIVE AND OBJECTIVE BOX
Patient is a 87y old  Male who presents with a chief complaint of Preoperative Planning for Right above knee amputation (25 Apr 2022 19:29)      SUBJECTIVE / OVERNIGHT EVENTS:    Events noted.  CONSTITUTIONAL: No fever,  or fatigue  RESPIRATORY: On supp O2  CARDIOVASCULAR: No chest pain, palpitations.  GASTROINTESTINAL: No abdominal or epigastric pain.       MEDICATIONS  (STANDING):  acetaminophen     Tablet .. 975 milliGRAM(s) Oral every 8 hours  albuterol/ipratropium for Nebulization 3 milliLiter(s) Nebulizer every 6 hours  atorvastatin 40 milliGRAM(s) Oral at bedtime  buDESOnide    Inhalation Suspension 0.5 milliGRAM(s) Inhalation every 12 hours  finasteride 5 milliGRAM(s) Oral daily  furosemide   Injectable 40 milliGRAM(s) IV Push every 24 hours  gabapentin 600 milliGRAM(s) Oral every 8 hours  guaiFENesin ER 1200 milliGRAM(s) Oral every 12 hours  heparin  Infusion 1450 Unit(s)/Hr (18 mL/Hr) IV Continuous <Continuous>  insulin glargine Injectable (LANTUS) 15 Unit(s) SubCutaneous at bedtime  insulin lispro (ADMELOG) corrective regimen sliding scale   SubCutaneous at bedtime  insulin lispro (ADMELOG) corrective regimen sliding scale   SubCutaneous three times a day before meals  insulin lispro Injectable (ADMELOG) 3 Unit(s) SubCutaneous before dinner  levothyroxine 25 MICROGram(s) Oral daily  metoprolol succinate ER 25 milliGRAM(s) Oral daily  montelukast 10 milliGRAM(s) Oral daily  multivitamin/minerals 1 Tablet(s) Oral daily  pantoprazole    Tablet 40 milliGRAM(s) Oral before breakfast  polyethylene glycol 3350 17 Gram(s) Oral daily  senna 2 Tablet(s) Oral at bedtime  tamsulosin 0.8 milliGRAM(s) Oral at bedtime    MEDICATIONS  (PRN):  traMADol 25 milliGRAM(s) Oral every 4 hours PRN Moderate Pain (4 - 6)  traMADol 50 milliGRAM(s) Oral every 4 hours PRN Severe Pain (7 - 10)        CAPILLARY BLOOD GLUCOSE      POCT Blood Glucose.: 191 mg/dL (25 Apr 2022 21:19)  POCT Blood Glucose.: 146 mg/dL (25 Apr 2022 17:12)  POCT Blood Glucose.: 114 mg/dL (25 Apr 2022 12:26)  POCT Blood Glucose.: 150 mg/dL (25 Apr 2022 08:26)    I&O's Summary    24 Apr 2022 07:01  -  25 Apr 2022 07:00  --------------------------------------------------------  IN: 1744 mL / OUT: 2000 mL / NET: -256 mL    25 Apr 2022 07:01  -  25 Apr 2022 22:47  --------------------------------------------------------  IN: 696 mL / OUT: 700 mL / NET: -4 mL        T(C): 36.7 (04-25-22 @ 21:21), Max: 37 (04-25-22 @ 16:05)  HR: 91 (04-25-22 @ 21:21) (69 - 92)  BP: 134/68 (04-25-22 @ 21:21) (114/60 - 134/68)  RR: 20 (04-25-22 @ 21:21) (18 - 20)  SpO2: 94% (04-25-22 @ 21:21) (93% - 97%)    PHYSICAL EXAM:  GENERAL: NAD  NECK: Supple, No JVD  CHEST/LUNG: Clear to auscultation bilaterally; No wheezing.  HEART: Regular rate and rhythm; No murmurs, rubs, or gallops  ABDOMEN: Soft, Nontender, Nondistended; Bowel sounds present  EXTREMITIES:   Rt BKA  NEUROLOGY: Awake      LABS:                        7.3    15.28 )-----------( 180      ( 25 Apr 2022 07:29 )             24.7     04-25    145  |  106  |  53<H>  ----------------------------<  128<H>  4.0   |  26  |  1.24    Ca    8.4      25 Apr 2022 07:29  Phos  3.1     04-25  Mg     2.0     04-25      PT/INR - ( 25 Apr 2022 07:29 )   PT: 13.5 sec;   INR: 1.16 ratio         PTT - ( 25 Apr 2022 07:29 )  PTT:59.1 sec        CAPILLARY BLOOD GLUCOSE      POCT Blood Glucose.: 191 mg/dL (25 Apr 2022 21:19)  POCT Blood Glucose.: 146 mg/dL (25 Apr 2022 17:12)  POCT Blood Glucose.: 114 mg/dL (25 Apr 2022 12:26)  POCT Blood Glucose.: 150 mg/dL (25 Apr 2022 08:26)        RADIOLOGY & ADDITIONAL TESTS:    Imaging Personally Reviewed:    Consultant(s) Notes Reviewed:      Care Discussed with Consultants/Other Providers:    Graham Pulliam MD, CMD, FACP    [address] [place] [address]  Office Tel: [phone]  Cell: [phone]

## 2022-04-25 NOTE — PROGRESS NOTE ADULT - SUBJECTIVE AND OBJECTIVE BOX
GENERAL SURGERY PROGRESS NOTE   ___________________________________________________________________    LIBRA PEÑA | [id number] | 87y Male | [facility] 9MON 909 W1 | LOS 24d    Attending: Anmol Quinn    ___________________________________________________________________    CC: Patient is a 87y old  Male who presents with a chief complaint of Preoperative Planning for Right above knee amputation (2022 14:22)      SUBJECTIVE:   Patient seen today during morning rounds at bedside and found to be without acute distress. Denies chest pain, fever, severe pain, or SOB.     Overnight: Unremarkable    Allegies:  NKDA    OBJECTIVE:  Vitals:    T(C): 36.8 (22 @ 21:56), Max: 37 (22 @ 05:00)  HR: 98 (22 @ 21:56) (71 - 98)  BP: 129/75 (22 @ 21:56) (124/65 - 130/62)  RR: 18 (22 @ 21:56) (18 - 18)  SpO2: 92% (22 @ 21:56) (91% - 93%)      OUT:    Indwelling Catheter - Urethral (mL): 2250 mL  Total OUT: 2250 mL      OUT:    Indwelling Catheter - Urethral (mL): 1250 mL  Total OUT: 1250 mL        Physical Exam:   Gen: NAD   CV: Regular rate  Resp: Nonlabored breathing with face mask in place  Ext: R BKA stump with operative dressing in place that is c/d/i, no strikethrough noted, stump appears to be healing well, knee immobilizer in place    Medications:  heparin  Infusion 1450 IV Continuous <Continuous>    acetaminophen     Tablet .. 975 milliGRAM(s) Oral every 8 hours  albuterol/ipratropium for Nebulization 3 milliLiter(s) Nebulizer every 6 hours  buDESOnide    Inhalation Suspension 0.5 milliGRAM(s) Inhalation every 12 hours  furosemide   Injectable 40 milliGRAM(s) IV Push every 24 hours  gabapentin 600 milliGRAM(s) Oral every 8 hours  guaiFENesin ER 1200 milliGRAM(s) Oral every 12 hours  metoprolol succinate ER 25 milliGRAM(s) Oral daily  montelukast 10 milliGRAM(s) Oral daily  pantoprazole    Tablet 40 milliGRAM(s) Oral before breakfast  polyethylene glycol 3350 17 Gram(s) Oral daily  senna 2 Tablet(s) Oral at bedtime  tamsulosin 0.8 milliGRAM(s) Oral at bedtime        Laboratory:  WBC: 15.37 H&H: 7.1/24.1 Plt: 206  WBC: 18.18 H&H: 7.8/26.7 Plt: 256    Chemistry:                               Phos: 3.7 M.1  143  |  105  |  51  ----------------------------<  141  4.2   |  26  |  1.29        ,                              Phos: 4.2 M.2  143  |  106  |  53  ----------------------------<  117  4.4   |  24  |  1.27          PTT 83.6 PT/INR ---/---          Reviewed laboratory and imaging

## 2022-04-25 NOTE — PROGRESS NOTE ADULT - SUBJECTIVE AND OBJECTIVE BOX
Patient is a 87y old  Male who presents with a chief complaint of Preoperative Planning for Right above knee amputation (25 Apr 2022 15:53)       INTERVAL HPI/OVERNIGHT EVENTS:  Patient seen and evaluated at bedside.  Pt is resting comfortable in NAD.     Allergies    No Known Allergies    Intolerances        Vital Signs Last 24 Hrs  T(C): 37 (25 Apr 2022 16:05), Max: 37 (25 Apr 2022 16:05)  T(F): 98.6 (25 Apr 2022 16:05), Max: 98.6 (25 Apr 2022 16:05)  HR: 69 (25 Apr 2022 16:05) (69 - 98)  BP: 129/70 (25 Apr 2022 16:05) (114/60 - 130/71)  BP(mean): --  RR: 18 (25 Apr 2022 16:05) (18 - 19)  SpO2: 97% (25 Apr 2022 16:05) (92% - 97%)    LABS:                        7.3    15.28 )-----------( 180      ( 25 Apr 2022 07:29 )             24.7     04-25    145  |  106  |  53<H>  ----------------------------<  128<H>  4.0   |  26  |  1.24    Ca    8.4      25 Apr 2022 07:29  Phos  3.1     04-25  Mg     2.0     04-25      PT/INR - ( 25 Apr 2022 07:29 )   PT: 13.5 sec;   INR: 1.16 ratio         PTT - ( 25 Apr 2022 07:29 )  PTT:59.1 sec    CAPILLARY BLOOD GLUCOSE      POCT Blood Glucose.: 146 mg/dL (25 Apr 2022 17:12)  POCT Blood Glucose.: 114 mg/dL (25 Apr 2022 12:26)  POCT Blood Glucose.: 150 mg/dL (25 Apr 2022 08:26)  POCT Blood Glucose.: 208 mg/dL (24 Apr 2022 21:39)      Lower Extremity Physical Exam:  s/p RLE BKA amputation  left foot exam:  Vasular: DP 1/4, PT 0/4 CFT <3 seconds, Temperature gradient wnl.   Neuro: Epicritic sensation decreased to the level of foot  Musculoskeletal/Ortho: hammertoes 2-5   Skin: left foot heel discoloration secondary to ischemia with no signs of infection, ischemic changes noted at plantar submet 5 as well

## 2022-04-25 NOTE — PROGRESS NOTE ADULT - ASSESSMENT
86 year old with PVD, COPD, admitted with progressive infected wounds for right BKA. Also with history of TOM (not being treated at present AF, ERCP for stones. Pt had guillotine BKA. Post op had exacerbation of COPD. Received Prednisone in the icu. Has had increasing wbc last several days  (22K)    wound had vac over it but was described as having some erythema .  Also is coughing has rhonchi and lower lobe infiltrates.    WBC could be due to steroids, pna or open wd                steroids being  tapered    yanci off ab after closure of wound   wd is clean  wbc still elevated   continue to watch off ab  please call for any clinical change

## 2022-04-25 NOTE — CONSULT NOTE ADULT - CONSULT REQUESTED BY NAME
Dr. Quinn
Anmol Quinn
Anmol Moy  [address]
medicine
Dr Quinn Worley
Dr. Quinn
Vascular
[place]
Dr Quinn
primary
Unknown

## 2022-04-26 NOTE — PROGRESS NOTE ADULT - SUBJECTIVE AND OBJECTIVE BOX
GENERAL SURGERY PROGRESS NOTE   ___________________________________________________________________    LIBRA PEÑA | [id number] | 87y Male | [facility] 909 W1 | LOS 25d    Attending: Anmol Quinn    ___________________________________________________________________    CC: Patient is a 87y old  Male who presents with a chief complaint of Preoperative Planning for Right above knee amputation (2022 19:29)      SUBJECTIVE:   Patient seen today during morning rounds at bedside and found to be without acute distress. Denies chest pain, fever, severe pain, or SOB.     Overnight: Unremarkable    Allegies:  NKDA    OBJECTIVE:  Vitals:    T(C): 36.7 (22 @ 21:21), Max: 37 (22 @ 16:05)  HR: 91 (22 @ 21:21) (69 - 92)  BP: 134/68 (22 @ 21:21) (114/60 - 134/68)  RR: 20 (22 @ 21:21) (18 - 20)  SpO2: 94% (22 @ 21:21) (93% - 97%)      OUT:    Indwelling Catheter - Urethral (mL): 2000 mL  Total OUT: 2000 mL      OUT:    Post-Void Residual per Intermittent Catheterization (mL): 700 mL  Total OUT: 700 mL        Physical Exam:   Gen: NAD   CV: Regular rate  Resp: Nonlabored breathing with face mask in place  Ext: R BKA stump with operative dressing in place that is c/d/i, no strikethrough noted, stump appears to be healing well, knee immobilizer in place      Medications:  heparin  Infusion 1450 IV Continuous <Continuous>    acetaminophen     Tablet .. 975 milliGRAM(s) Oral every 8 hours  albuterol/ipratropium for Nebulization 3 milliLiter(s) Nebulizer every 6 hours  buDESOnide    Inhalation Suspension 0.5 milliGRAM(s) Inhalation every 12 hours  furosemide   Injectable 40 milliGRAM(s) IV Push every 24 hours  gabapentin 600 milliGRAM(s) Oral every 8 hours  guaiFENesin ER 1200 milliGRAM(s) Oral every 12 hours  metoprolol succinate ER 25 milliGRAM(s) Oral daily  montelukast 10 milliGRAM(s) Oral daily  pantoprazole    Tablet 40 milliGRAM(s) Oral before breakfast  polyethylene glycol 3350 17 Gram(s) Oral daily  senna 2 Tablet(s) Oral at bedtime  tamsulosin 0.8 milliGRAM(s) Oral at bedtime        Laboratory:  WBC: 15.28 H&H: 7.3/24.7 Plt: 180  WBC: 15.37 H&H: 7.1/24.1 Plt: 206    Chemistry:                               Phos: 3.1 M.0  145  |  106  |  53  ----------------------------<  128  4.0   |  26  |  1.24        ,                              Phos: 3.7 M.1  143  |  105  |  51  ----------------------------<  141  4.2   |  26  |  1.29          PTT 59.1 PT/INR 13.5/1.16          Reviewed laboratory and imaging     VASCULAR SURGERY PROGRESS NOTE   ___________________________________________________________________    LIBRA PEÑA | [id number] | 87y Male | [facility] 909 W1 | LOS 25d    Attending: Anmol Quinn    ___________________________________________________________________    CC: Patient is a 87y old  Male who presents with a chief complaint of Preoperative Planning for Right above knee amputation (2022 19:29)      SUBJECTIVE:   Patient seen today during morning rounds at bedside and found to be without acute distress. Denies chest pain, fever, severe pain, or SOB.     Overnight: Unremarkable    Allegies:  NKDA    OBJECTIVE:  Vitals:    T(C): 36.7 (22 @ 21:21), Max: 37 (22 @ 16:05)  HR: 91 (22 @ 21:21) (69 - 92)  BP: 134/68 (22 @ 21:21) (114/60 - 134/68)  RR: 20 (22 @ 21:21) (18 - 20)  SpO2: 94% (22 @ 21:21) (93% - 97%)      OUT:    Indwelling Catheter - Urethral (mL): 2000 mL  Total OUT: 2000 mL      OUT:    Post-Void Residual per Intermittent Catheterization (mL): 700 mL  Total OUT: 700 mL        Physical Exam:   Gen: NAD   CV: Regular rate  Resp: Nonlabored breathing with face mask in place  Ext: R BKA stump with operative dressing in place that is c/d/i, no strikethrough noted, stump appears to be healing well, knee immobilizer in place      Medications:  heparin  Infusion 1450 IV Continuous <Continuous>    acetaminophen     Tablet .. 975 milliGRAM(s) Oral every 8 hours  albuterol/ipratropium for Nebulization 3 milliLiter(s) Nebulizer every 6 hours  buDESOnide    Inhalation Suspension 0.5 milliGRAM(s) Inhalation every 12 hours  furosemide   Injectable 40 milliGRAM(s) IV Push every 24 hours  gabapentin 600 milliGRAM(s) Oral every 8 hours  guaiFENesin ER 1200 milliGRAM(s) Oral every 12 hours  metoprolol succinate ER 25 milliGRAM(s) Oral daily  montelukast 10 milliGRAM(s) Oral daily  pantoprazole    Tablet 40 milliGRAM(s) Oral before breakfast  polyethylene glycol 3350 17 Gram(s) Oral daily  senna 2 Tablet(s) Oral at bedtime  tamsulosin 0.8 milliGRAM(s) Oral at bedtime        Laboratory:      WBC: 15.28 H&H: 7.3/24.7 Plt: 180  WBC: 15.37 H&H: 7.1/24.1 Plt: 206    Chemistry:                               Phos: 3.1 M.0  145  |  106  |  53  ----------------------------<  128  4.0   |  26  |  1.24        ,                              Phos: 3.7 M.1  143  |  105  |  51  ----------------------------<  141  4.2   |  26  |  1.29          PTT 59.1 PT/INR 13.5/1.16          Reviewed laboratory and imaging

## 2022-04-26 NOTE — PROGRESS NOTE ADULT - SUBJECTIVE AND OBJECTIVE BOX
DIABETES FOLLOW UP : Seen earlier today    INTERVAL HX: bg mostly at goal past3-4 days, spoke with student nurse on floor reports he fed pt breakfast and lunch pt ate 100% of meal , wife/daughter at bedside report pt has been tolerating PO . pt just received gabapentin and is sleeping currently       Review of Systems:  sleeping , unable     Allergies    No Known Allergies    Intolerances      MEDICATIONS  (STANDING):  acetaminophen     Tablet .. 975 milliGRAM(s) Oral every 8 hours  albuterol/ipratropium for Nebulization 3 milliLiter(s) Nebulizer every 6 hours  atorvastatin 40 milliGRAM(s) Oral at bedtime  buDESOnide    Inhalation Suspension 0.5 milliGRAM(s) Inhalation every 12 hours  finasteride 5 milliGRAM(s) Oral daily  furosemide    Tablet 20 milliGRAM(s) Oral two times a day  gabapentin 600 milliGRAM(s) Oral every 8 hours  guaiFENesin ER 1200 milliGRAM(s) Oral every 12 hours  heparin  Infusion 1450 Unit(s)/Hr (18 mL/Hr) IV Continuous <Continuous>  insulin glargine Injectable (LANTUS) 15 Unit(s) SubCutaneous at bedtime  insulin lispro (ADMELOG) corrective regimen sliding scale   SubCutaneous at bedtime  insulin lispro (ADMELOG) corrective regimen sliding scale   SubCutaneous three times a day before meals  insulin lispro Injectable (ADMELOG) 3 Unit(s) SubCutaneous before lunch  insulin lispro Injectable (ADMELOG) 3 Unit(s) SubCutaneous before dinner  insulin lispro Injectable (ADMELOG) 5 Unit(s) SubCutaneous before breakfast  levothyroxine 25 MICROGram(s) Oral daily  metoprolol succinate ER 25 milliGRAM(s) Oral daily  montelukast 10 milliGRAM(s) Oral daily  multivitamin/minerals 1 Tablet(s) Oral daily  pantoprazole    Tablet 40 milliGRAM(s) Oral before breakfast  polyethylene glycol 3350 17 Gram(s) Oral daily  senna 2 Tablet(s) Oral at bedtime  tamsulosin 0.8 milliGRAM(s) Oral at bedtime  warfarin 7.5 milliGRAM(s) Oral once      PHYSICAL EXAM:  VITALS: T(C): 36.4 (04-26-22 @ 13:18)  T(F): 97.5 (04-26-22 @ 13:18), Max: 98.6 (04-25-22 @ 16:05)  HR: 71 (04-26-22 @ 13:18) (69 - 91)  BP: 138/71 (04-26-22 @ 13:18) (122/68 - 138/71)  RR:  (18 - 20)  SpO2:  (94% - 97%)  Wt(kg): --  GENERAL: male laying in bed in NAD  Abdomen: Soft, nontender, non distended  Extremities: Warm AKA   NEURO: sleeping     LABS:  POCT Blood Glucose.: 117 mg/dL (04-26-22 @ 12:31)  POCT Blood Glucose.: 122 mg/dL (04-26-22 @ 08:41)  POCT Blood Glucose.: 191 mg/dL (04-25-22 @ 21:19)  POCT Blood Glucose.: 146 mg/dL (04-25-22 @ 17:12)  POCT Blood Glucose.: 114 mg/dL (04-25-22 @ 12:26)  POCT Blood Glucose.: 150 mg/dL (04-25-22 @ 08:26)  POCT Blood Glucose.: 208 mg/dL (04-24-22 @ 21:39)  POCT Blood Glucose.: 181 mg/dL (04-24-22 @ 18:42)  POCT Blood Glucose.: 143 mg/dL (04-24-22 @ 13:32)  POCT Blood Glucose.: 150 mg/dL (04-24-22 @ 08:33)  POCT Blood Glucose.: 113 mg/dL (04-23-22 @ 21:31)  POCT Blood Glucose.: 125 mg/dL (04-23-22 @ 17:51)                            6.9    11.82 )-----------( 152      ( 26 Apr 2022 07:23 )             23.5       04-26    144  |  105  |  45<H>  ----------------------------<  106<H>  3.9   |  26  |  1.10    Ca    8.5      26 Apr 2022 07:13  Phos  3.0     04-26  Mg     2.0     04-26        eGFR: 65 mL/min/1.73m2 (26 Apr 2022 07:13)        A1C with Estimated Average Glucose Result: 6.8 % (04-02-22 @ 12:21)      Estimated Average Glucose: 148 mg/dL (04-02-22 @ 12:21)

## 2022-04-26 NOTE — PROGRESS NOTE ADULT - SUBJECTIVE AND OBJECTIVE BOX
Patient seen and examined  no complaints    No Known Allergies    Hospital Medications:   MEDICATIONS  (STANDING):  acetaminophen     Tablet .. 975 milliGRAM(s) Oral every 8 hours  albuterol/ipratropium for Nebulization 3 milliLiter(s) Nebulizer every 6 hours  atorvastatin 40 milliGRAM(s) Oral at bedtime  buDESOnide    Inhalation Suspension 0.5 milliGRAM(s) Inhalation every 12 hours  finasteride 5 milliGRAM(s) Oral daily  furosemide   Injectable 40 milliGRAM(s) IV Push every 24 hours  gabapentin 600 milliGRAM(s) Oral every 8 hours  guaiFENesin ER 1200 milliGRAM(s) Oral every 12 hours  heparin  Infusion 1450 Unit(s)/Hr (18 mL/Hr) IV Continuous <Continuous>  insulin glargine Injectable (LANTUS) 15 Unit(s) SubCutaneous at bedtime  insulin lispro (ADMELOG) corrective regimen sliding scale   SubCutaneous at bedtime  insulin lispro (ADMELOG) corrective regimen sliding scale   SubCutaneous three times a day before meals  insulin lispro Injectable (ADMELOG) 3 Unit(s) SubCutaneous before lunch  insulin lispro Injectable (ADMELOG) 3 Unit(s) SubCutaneous before dinner  insulin lispro Injectable (ADMELOG) 5 Unit(s) SubCutaneous before breakfast  levothyroxine 25 MICROGram(s) Oral daily  metoprolol succinate ER 25 milliGRAM(s) Oral daily  montelukast 10 milliGRAM(s) Oral daily  multivitamin/minerals 1 Tablet(s) Oral daily  pantoprazole    Tablet 40 milliGRAM(s) Oral before breakfast  polyethylene glycol 3350 17 Gram(s) Oral daily  senna 2 Tablet(s) Oral at bedtime  tamsulosin 0.8 milliGRAM(s) Oral at bedtime  warfarin 7.5 milliGRAM(s) Oral once        VITALS:  T(F): 98.2 (04-26-22 @ 09:46), Max: 98.6 (04-25-22 @ 16:05)  HR: 74 (04-26-22 @ 09:46)  BP: 138/68 (04-26-22 @ 09:46)  RR: 18 (04-26-22 @ 09:46)  SpO2: 97% (04-26-22 @ 09:46)  Wt(kg): --    04-25 @ 07:01  -  04-26 @ 07:00  --------------------------------------------------------  IN: 696 mL / OUT: 1900 mL / NET: -1204 mL    04-26 @ 07:01  -  04-26 @ 12:32  --------------------------------------------------------  IN: 240 mL / OUT: 450 mL / NET: -210 mL      Physical Exam :-  Constitutional: NAD  Neck: Supple.  Respiratory: Bilateral equal breath sounds,  Cardiovascular: S1, S2 normal,  Gastrointestinal: Bowel Sounds present, soft, non tender.  Extremities: Right BKA    LABS:  04-26    144  |  105  |  45<H>  ----------------------------<  106<H>  3.9   |  26  |  1.10    Ca    8.5      26 Apr 2022 07:13  Phos  3.0     04-26  Mg     2.0     04-26      Creatinine Trend: 1.10 <--, 1.24 <--, 1.29 <--, 1.27 <--, 1.33 <--, 1.47 <--, 1.72 <--                        6.9    11.82 )-----------( 152      ( 26 Apr 2022 07:23 )             23.5     Urine Studies:      RADIOLOGY & ADDITIONAL STUDIES:

## 2022-04-26 NOTE — PROGRESS NOTE ADULT - ASSESSMENT
87 y/o M w/ uncontrolled Type 2 DM complicated by neuropathy and nephropathy with hyperglycemia exacerbated by steroids, HTN, HLD, hypothyroidism admitted for right LE AKA now on basal/bolus. BG goal (100-180mg/dl). s/p completed prednisone taper       Uncontrolled type 2 diabetes mellitus with hyperglycemia. -test BG AC/HS  -adjust Lantus 13 units   -Admelog 4                                          -3- 3 w/meals  -c/w Admelog moderate correction scale AC and mod HS scale  -on soft /bite sized diet ADD CHO restriction  Outpt. endo follow-up  Outpt. optho, podiatry, nephrology  Plan to d/c on basal + orals including SGLT2 given CKD. Would avoid metformin and sulfonylurea.  -dispo planning to rehab- at rehab continue basal bolus following doses: Lantus 13 units sq qhs; and admelog 4-3-3 at rehab    Essential hypertension.   ·  Plan: Goal BP <140/90, on metoprolol.    Hyperlipidemia.   ·  Plan: on statin.    Hypothyroidism.   ·  Plan: continue levothyroxine. Repeat TFTs as outpatient  pt has been admitted for prolonged length of time, noted pt has been receiving synthroid at same time as protonix & other medications which alters synthroid absorption  please ensure pt is receiving synthroid 1 hour prior to all other medications on an empty stomach   repeat TFT as outpatient as pt on steroids which can make interpretation difficult so no need to check inpatient at this time     Discussed with patient and primary  team Vascular PA  Contact via [employer] Teams during business hours  On evenings and weekends, please call [phone] or page endocrine fellow on call.   Please note that this patient may be followed by different provider tomorrow.    Time spent on encounter: 28  minutes spent on total encounter; The necessity of the time spent during the encounter on this date of service was due to development of plan of care/coordination of care/glycemic control through review of labs, blood glucose values and vital signs.  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 BG goal (100-180mg/dl). s/p completed prednisone taper       Uncontrolled type 2 diabetes mellitus with hyperglycemia. -test BG AC/HS  -adjust Lantus 13 units   -Admelog 4                                          -3- 3 w/meals  -c/w Admelog moderate correction scale AC and mod HS scale  -on soft /bite sized diet ADD CHO restriction  Outpt. endo follow-up  Outpt. optho, podiatry, nephrology  Plan to d/c on basal + orals including SGLT2 given CKD. Would avoid metformin and sulfonylurea.  -dispo planning to rehab- at rehab continue basal bolus following doses: Lantus 13 units sq qhs; and admelog 4-3-3 at rehab    ENDOCRINE FOLLOW UP  CALL PATIENT ACCESS SERVICES  [phone]  For all [facility] phone numbers and locations throughout [facility], and [place].      If patient wishes to follow up with [facility] at [facility]    [facility]  [address] [place] [address], [address], [place] [address]  [phone]   Please call to schedule appointment with CDE/Nutritionist and MD appointment next available     Essential hypertension.   ·  Plan: Goal BP <140/90, on metoprolol.    Hyperlipidemia.   ·  Plan: on statin.    Hypothyroidism.   ·  Plan: continue levothyroxine. Repeat TFTs as outpatient  pt has been admitted for prolonged length of time, noted pt has been receiving synthroid at same time as protonix & other medications which alters synthroid absorption  please ensure pt is receiving synthroid 1 hour prior to all other medications on an empty stomach   repeat TFT as outpatient as pt on steroids which can make interpretation difficult so no need to check inpatient at this time     Discussed with patient and primary  team Vascular PA  Contact via [employer] Teams during business hours  On evenings and weekends, please call [phone] or page endocrine fellow on call.   Please note that this patient may be followed by different provider tomorrow.    Time spent on encounter: 28  minutes spent on total encounter; The necessity of the time spent during the encounter on this date of service was due to development of plan of care/coordination of care/glycemic control through review of labs, blood glucose values and vital signs.

## 2022-04-26 NOTE — PROGRESS NOTE ADULT - ASSESSMENT
87 yo male ex  smoker, h/o HTN, HLD, DM, CKD, CVA, CHF (mild systolic dysfunction) and atrial fibrillation with sick sinus syndrome s/p pacemaker implantation.  h/o COPD/ZACKARY, TOM colonization/infection, admitted on 4/1 with RLE wet gangrene, s/p R guillotine BKA,  AKA completed on 4/22  POD4 post R AKA, wound healing well,   pain controlled, as per surgery, will start DOAC.   podiatry following left heel discoloration, wearing z flow boots in bed  off heparin gtt for PAD, transitioned to coumadin, INR subtherapeutic  s/p COPD exacerbation. now stable  prednisone taper completed  albuterol/atrovent nebs   pulmicort 0.5mg nebs q12h   mucinex 1200mg 2 times daily  singulair 10mg @ bedtime  acapella device/incentive spirometer  on RA 91-92% pulse Ox  steroid induced hyperglycemia, now off insulin drip, on Lantus and prandial Admelog  CKD3/s/p KARLOS due to diuresis in the setting of euvolemia -> improved, renal following  HTN, systolic CHF, Afib stable, cardiology following  dispo to [facility]

## 2022-04-26 NOTE — PROGRESS NOTE ADULT - ASSESSMENT
86M with PMH of COPD (not on home o2), prior smoking history (40 pack years), HTN, HLD, Afib, CHF, T2DM, CVA, BPH, and PAD admitted for elective RLE AKA. Renal consulted for CKD Mx.    KARLOS on CKD 3,   baseline Cr ~1.7  serum k stable  bicarb stable  cont monitor Serum Creatinine   cont  lasix 40mg iv qd for now given lower ext edema  off losartan   monitor BMP daily and u/o   dose all meds for eGFR  avoid NSAIDs/Nephrotoxics.    Rt LE nonhealing wound:  follow up with vascular  S/P BKA      Anemia  will give epo 20k sub x 1  prbc per primary team    For any question, call:  Cell # [phone]  Dr Knight

## 2022-04-26 NOTE — PROGRESS NOTE ADULT - SUBJECTIVE AND OBJECTIVE BOX
Patient is a 87y old  Male who presents with a chief complaint of Preoperative Planning for Right above knee amputation (26 Apr 2022 15:37)      SUBJECTIVE / OVERNIGHT EVENTS:    MEDICATIONS  (STANDING):  acetaminophen     Tablet .. 975 milliGRAM(s) Oral every 8 hours  albuterol/ipratropium for Nebulization 3 milliLiter(s) Nebulizer every 6 hours  atorvastatin 40 milliGRAM(s) Oral at bedtime  buDESOnide    Inhalation Suspension 0.5 milliGRAM(s) Inhalation every 12 hours  finasteride 5 milliGRAM(s) Oral daily  furosemide    Tablet 20 milliGRAM(s) Oral two times a day  gabapentin 300 milliGRAM(s) Oral three times a day  guaiFENesin ER 1200 milliGRAM(s) Oral every 12 hours  heparin  Infusion 1450 Unit(s)/Hr (18 mL/Hr) IV Continuous <Continuous>  insulin glargine Injectable (LANTUS) 13 Unit(s) SubCutaneous at bedtime  insulin lispro (ADMELOG) corrective regimen sliding scale   SubCutaneous at bedtime  insulin lispro (ADMELOG) corrective regimen sliding scale   SubCutaneous three times a day before meals  insulin lispro Injectable (ADMELOG) 3 Unit(s) SubCutaneous before lunch  insulin lispro Injectable (ADMELOG) 3 Unit(s) SubCutaneous before dinner  levothyroxine 25 MICROGram(s) Oral daily  metoprolol succinate ER 25 milliGRAM(s) Oral daily  montelukast 10 milliGRAM(s) Oral daily  multivitamin/minerals 1 Tablet(s) Oral daily  naproxen 250 milliGRAM(s) Oral every 12 hours  pantoprazole    Tablet 40 milliGRAM(s) Oral before breakfast  polyethylene glycol 3350 17 Gram(s) Oral daily  senna 2 Tablet(s) Oral at bedtime  tamsulosin 0.8 milliGRAM(s) Oral at bedtime  warfarin 7.5 milliGRAM(s) Oral once    MEDICATIONS  (PRN):  traMADol 25 milliGRAM(s) Oral every 4 hours PRN Moderate Pain (4 - 6)      Vital Signs Last 24 Hrs  T(F): 98 (04-26-22 @ 17:53), Max: 98.3 (04-26-22 @ 01:04)  HR: 81 (04-26-22 @ 17:53) (71 - 91)  BP: 151/74 (04-26-22 @ 17:53) (122/68 - 151/74)  RR: 18 (04-26-22 @ 17:53) (18 - 20)  SpO2: 94% (04-26-22 @ 17:53) (94% - 97%)  Telemetry:   CAPILLARY BLOOD GLUCOSE      POCT Blood Glucose.: 130 mg/dL (26 Apr 2022 18:25)  POCT Blood Glucose.: 117 mg/dL (26 Apr 2022 12:31)  POCT Blood Glucose.: 122 mg/dL (26 Apr 2022 08:41)  POCT Blood Glucose.: 191 mg/dL (25 Apr 2022 21:19)    I&O's Summary    25 Apr 2022 07:01  -  26 Apr 2022 07:00  --------------------------------------------------------  IN: 696 mL / OUT: 1900 mL / NET: -1204 mL    26 Apr 2022 07:01  -  26 Apr 2022 19:20  --------------------------------------------------------  IN: 720 mL / OUT: 450 mL / NET: 270 mL        PHYSICAL EXAM:  GENERAL: NAD, well-developed  HEAD:  Atraumatic, Normocephalic  EYES: EOMI, PERRLA, conjunctiva and sclera clear  NECK: Supple, No JVD  CHEST/LUNG: Clear to auscultation bilaterally; No wheeze  HEART: Regular rate and rhythm; No murmurs, rubs, or gallops  ABDOMEN: Soft, Nontender, Nondistended; Bowel sounds present  EXTREMITIES:  2+ Peripheral Pulses, No clubbing, cyanosis, or edema  PSYCH: AAOx3  NEUROLOGY: non-focal  SKIN: No rashes or lesions    LABS:                        6.9    11.82 )-----------( 152      ( 26 Apr 2022 07:23 )             23.5     04-26    144  |  105  |  45<H>  ----------------------------<  106<H>  3.9   |  26  |  1.10    Ca    8.5      26 Apr 2022 07:13  Phos  3.0     04-26  Mg     2.0     04-26      PT/INR - ( 26 Apr 2022 07:23 )   PT: 13.7 sec;   INR: 1.18 ratio         PTT - ( 26 Apr 2022 07:23 )  PTT:76.2 sec          RADIOLOGY & ADDITIONAL TESTS:    Imaging Personally Reviewed:    Consultant(s) Notes Reviewed:      Care Discussed with Consultants/Other Providers:

## 2022-04-26 NOTE — PROGRESS NOTE ADULT - SUBJECTIVE AND OBJECTIVE BOX
[facility] - [facility] - PROGRESS NOTE    CHIEF COMPLAINT: acute hypoxic respiratory failure; COPD exacerbation; mucous plugging; atelectasis; weak cough; pleural effusion; dysphagia; right foot gangrene s/p BKA    INTERVAL HISTORY: s/p completion right BKA ; sedated and confused on an increased dose of gabapentin and "as needed" ultram for ongoing leg pain; remains on a heparin gtt now being bridged to coumadin; no shortness of breath or hypoxemia on a 2lpm nasal canula; occasional weak cough productive of scant sputum; chest congestion and wheeze is much improved and exacerbated when eating; no fevers, chills or sweats; no chest pain/pressure or palpitations; FEEST revealed severe dysphagia -> non oral nutrition recommended -> the family has elected to continue oral feeding understanding the risks of aspiration (small bites and easy to chew diet with moderately thickened fluids ordered); left facial droop and left sided weakness are no worse than usual;       REVIEW OF SYSTEMS:  Constitutional: As per interval history  HEENT: Within normal limits  CV: As per interval history  Resp: As per interval history  GI: Within normal limits   : Within normal limits  Musculoskeletal: Within normal limits  Skin: Within normal limits  Neurological: confused - lethargic  Psychiatric: Within normal limits  Endocrine: Within normal limits  Hematologic/Lymphatic: Within normal limits  Allergic/Immunologic: Within normal limits    MEDICATIONS:     Pulmonary "  albuterol/ipratropium for Nebulization 3 milliLiter(s) Nebulizer every 6 hours  buDESOnide    Inhalation Suspension 0.5 milliGRAM(s) Inhalation every 12 hours  guaiFENesin ER 1200 milliGRAM(s) Oral every 12 hours  montelukast 10 milliGRAM(s) Oral daily    Anti-microbials:    Cardiovascular:  furosemide    Tablet 20 milliGRAM(s) Oral two times a day  metoprolol succinate ER 25 milliGRAM(s) Oral daily  tamsulosin 0.8 milliGRAM(s) Oral at bedtime    Other:  acetaminophen     Tablet .. 975 milliGRAM(s) Oral every 8 hours  atorvastatin 40 milliGRAM(s) Oral at bedtime  finasteride 5 milliGRAM(s) Oral daily  gabapentin 300 milliGRAM(s) Oral three times a day  heparin  Infusion 1450 Unit(s)/Hr IV Continuous <Continuous>  insulin glargine Injectable (LANTUS) 13 Unit(s) SubCutaneous at bedtime  insulin lispro (ADMELOG) corrective regimen sliding scale   SubCutaneous three times a day before meals  insulin lispro (ADMELOG) corrective regimen sliding scale   SubCutaneous at bedtime  insulin lispro Injectable (ADMELOG) 3 Unit(s) SubCutaneous before dinner  insulin lispro Injectable (ADMELOG) 3 Unit(s) SubCutaneous before lunch  levothyroxine 25 MICROGram(s) Oral daily  multivitamin/minerals 1 Tablet(s) Oral daily  naproxen 250 milliGRAM(s) Oral every 12 hours  pantoprazole    Tablet 40 milliGRAM(s) Oral before breakfast  polyethylene glycol 3350 17 Gram(s) Oral daily  senna 2 Tablet(s) Oral at bedtime  warfarin 7.5 milliGRAM(s) Oral once    MEDICATIONS  (PRN):  traMADol 25 milliGRAM(s) Oral every 4 hours PRN Moderate Pain (4 - 6)    OBJECTIVE:    POCT Blood Glucose.: 117 mg/dL (2022 12:31)  POCT Blood Glucose.: 122 mg/dL (2022 08:41)  POCT Blood Glucose.: 191 mg/dL (2022 21:19)  POCT Blood Glucose.: 146 mg/dL (2022 17:12)    PHYSICAL EXAM:       ICU Vital Signs Last 24 Hrs  T(C): 36.4 (2022 13:18), Max: 36.8 (2022 01:04)  T(F): 97.5 (2022 13:18), Max: 98.3 (2022 01:04)  HR: 71 (2022 13:18) (71 - 91)  BP: 138/71 (2022 13:18) (122/68 - 138/71)  BP(mean): --  ABP: --  ABP(mean): --  RR: 18 (2022 13:18) (18 - 20)  SpO2: 96% (2022 13:18) (94% - 97%) on 2lpm nasal canula     General: Awake. Alert. Sedated. Confused. Not cooperative. No distress. Appears stated age. Obese. Laying flat in bed  HEENT: Atraumatic. Normocephalic. Anicteric. Normal oral mucosa. PERRL. EOMI.  Neck: Supple. Trachea midline. Thyroid without enlargement/tenderness/nodules. No carotid bruit. No JVD.	  Cardiovascular: Regular rate and rhythm. Distant S1 S2. No murmurs, rubs or gallops.  Respiratory: Respirations unlabored. Minimal bilateral rhonchi and wheeze. No curvature.  Abdomen: Soft. Non-tender. Non-distended. No organomegaly. No masses. Normal bowel sounds.  Extremities: Warm to touch. No clubbing or cyanosis. No pedal edema. Right BKA bandaged. Decreased swelling and pitting edema of the left upper extremity  Pulses: Decreased peripheral pulses LLE  Skin: Venous stasis changes left lower extremity  Lymph Nodes: Cervical, supraclavicular and axillary nodes normal  Neurological: Left sided weakness left > arm. Left facial droop. A and O x 1  Psychiatry: Appropriate mood and affect.    LABS:                          6.9    11.82 )-----------( 152      ( 2022 07:23 )             23.5     CBC    WBC  11.82 <==, 15.28 <==, 15.37 <==, 18.18 <==, 21.20 <==, 22.61 <==, 21.90 <==    Hemoglobin  6.9 <<==, 7.3 <<==, 7.1 <<==, 7.8 <<==, 8.0 <<==, 8.1 <<==, 7.8 <<==    Hematocrit  23.5 <==, 24.7 <==, 24.1 <==, 26.7 <==, 26.4 <==, 27.2 <==, 26.6 <==    Platelets  152 <==, 180 <==, 206 <==, 256 <==, 250 <==, 277 <==, 279 <==      144  |  105  |  45<H>  ----------------------------<  106<H>    04-26  3.9   |  26  |  1.10      LYTES    sodium  144 <==, 145 <==, 143 <==, 143 <==, 141 <==, 146 <==, 143 <==    potassium   3.9 <==, 4.0 <==, 4.2 <==, 4.4 <==, 4.1 <==, 4.3 <==, 4.2 <==    chloride  105 <==, 106 <==, 105 <==, 106 <==, 105 <==, 109 <==, 107 <==    carbon dioxide  26 <==, 26 <==, 26 <==, 24 <==, 23 <==, 22 <==, 22 <==    =============================================================================================  RENAL FUNCTION:    Creatinine:   1.10  <<==, 1.24  <<==, 1.29  <<==, 1.27  <<==, 1.33  <<==, 1.47  <<==, 1.72  <<==    BUN:   45 <==, 53 <==, 51 <==, 53 <==, 64 <==, 70 <==, 79 <==  ============================================================================================  calcium   8.5 <==, 8.4 <==, 8.6 <==, 8.8 <==, 8.7 <==, 8.6 <==, 8.4 <==    phos   3.0 <==, 3.1 <==, 3.7 <==, 4.2 <==, 3.8 <==, 4.2 <==, 4.7 <==    mag   2.0 <==, 2.0 <==, 2.1 <==, 2.2 <==, 2.2 <==, 2.3 <==, 2.4 <==  ============================================================================================  PT/INR - ( 2022 07:23 )   PT: 13.7 sec;   INR: 1.18 ratio       PTT - ( 2022 07:23 )  PTT:76.2 sec    ABG - ( 2022 18:43 )  pH: 7.43  /  pCO2: 35    /  pO2: 80    / HCO3: 23    / Base Excess: -0.8  /  SaO2: 99.0      Venous Blood Gas:   @ 22:10  7.40/41/52/25/84.4  VBG Lactate: 1.8    Venous Blood Gas:   @ 22:23  7.40/39/45/24/78.1  VBG Lactate: 2.4    Venous Blood Gas:   @ 22:23  7.40/39/45/24/78.1  VBG Lactate: 2.4    Venous Blood Gas:  04-10 @ 23:19  7.39/42/44/25/72.2  VBG Lactate: 2.5    < from: TTE with Doppler (w/Cont) (22 @ 15:07) >    Patient name: Martir Heart  YOB: 1935   Age: 86 (M)   MR#: [id number]  Study Date: 4/3/2022  Location: [address] [place], [place] [id number]: Angie Olivarez RDCS  Study quality: Technically difficult  Referring Physician: Anmol Quinn MD  BloodPressure: 115/70 mmHg  Height: 173 cm  Weight: 92 kg  BSA: 2.1 m2  ------------------------------------------------------------------------  PROCEDURE: Transthoracic echocardiogram with 2-D, M-Mode  and complete spectral and color flow Doppler. Verbal  consent was obtained for injection of  Ultrasonic Enhancing  Agent following a discussion of risks and benefits.  Following intravenous injection of Ultrasonic Enhancing  Agent, harmonic imaging was performed.  INDICATION: Cardiomyopathy, unspecified (I42.9)  ------------------------------------------------------------------------  Dimensions:    Normal Values:  LA:     3.1    2.0 - 4.0 cm  Ao:     3.5    2.0 - 3.8 cm  SEPTUM: 1.1    0.6 - 1.2 cm  PWT:    1.3    0.6 - 1.1 cm  LVIDd:  4.3    3.0- 5.6 cm  LVIDs:  3.2    1.8 - 4.0 cm  Derived variables:  LVMI: 90 g/m2  RWT: 0.60  Fractional short: 26 %  EF (Visual Estimate): NWV %  Doppler Peak Velocity (m/sec): MV=1.6 AoV=1.4  ------------------------------------------------------------------------  Conclusions:  1. Aortic valve not well visualized; appears calcified.  Peak transaortic valve gradient equals 8 mm Hg, mean  transaortic valve gradient equals 3 mm Hg, estimated aortic  valve area equals 2 sqcm (by continuity equation), aortic  valve velocity time integral equals 22 cm, consistent with  mild aortic stenosis.  2. Endocardial visualization enhanced with intravenous  injection of Ultrasonic Enhancing Agent (Definity). Mild  left ventricular systolic dysfunction. The inferior wall,  and the inferoseptum are hypokinetic.  3. The right ventricle is not well visualized; grossly  normal right ventricular systolic function.  ------------------------------------------------------------------------  Confirmed on  4/3/2022 - 17:12:14 by BRITTANY Giraldo  ------------------------------------------------------------------------  -----------------------------------------------------------  MICROBIOLOGY:     COVID-19 PCR . (22 @ 18:23)   COVID-19 PCR: NotDetec:     Urinalysis Basic - ( 2022 06:05 )    Color: Light Yellow / Appearance: Clear / S.021 / pH: x  Gluc: x / Ketone: Negative  / Bili: Negative / Urobili: Negative   Blood: x / Protein: Trace / Nitrite: Negative   Leuk Esterase: Moderate / RBC: 185 /hpf / WBC 12 /HPF   Sq Epi: x / Non Sq Epi: 2 /hpf / Bacteria: Negative    Culture - Blood (22 @ 11:16)   Specimen Source: .Blood Blood-Peripheral   Culture Results:   No growth to date.     Culture - Blood (22 @ 11:16)   Specimen Source: .Blood Blood-Peripheral   Culture Results:   No growth to date.     RADIOLOGY:  [x] Chest radiographs reviewed and interpreted by me    EXAM:  DUPLEX EXT VEINS UPPER LT                          PROCEDURE DATE:  2022      IMPRESSION:  No evidence of left upper extremity deep venous thrombosis.    NARCISO MUÑOZ MD; Attending Radiologist  This document has been electronically signed. 2022  8:48PM  ---------------------------------------------------------------------------------------------------------------  EXAM:  XR CHEST PORTABLE URGENT 1V                          PROCEDURE DATE:  2022      FINDINGS:  Left chest wall pacemaker.  The heart size cannot be accurately evaluated on this projection.  Bilateral lower lung hazy opacities, reduced since 2022.  There is no pneumothorax.    IMPRESSION:  Partial clearing of the bases and left effusion.    ANITA INFANTE MD; Resident Radiologist  This document has been electronically signed.  HORACIO HELMS MD; Attending Interventional Radiologist  Thisdocument has been electronically signed. 2022  3:57PM  --------------------------------------------------------------------------------------------------------------  EXAM:  CT BRAIN                          PROCEDURE DATE:  2022      FINDINGS:    Prominence of ventricles and subarachnoid spaces, compatible with   atrophy. Periventricular small vessel white matter ischemic changes.   Encephalomalacia and gliosis is appreciated bilaterally compatible with   old regions of infarction, noted in a high left posterior frontal   location, right posterior parietal/occipital location, and right   cerebellar location. Old ischemic event is also appreciated along the   anterior limb of the internal capsule on the left and along the external   capsular region on the right.    There is prominence of the bifrontal extra-axial regions, suggestive of   bifrontal subdural hygromas, more prominent on the left than the   right-stable compared with prior. Prominent deposition of calcified   plaque within the bilateral carotid siphons, as on prior.    Deposition of calcium appreciated within the bilateral basal ganglia, as   on prior.    No acute intracranial hemorrhage. No intraparenchymal mass lesion, mass   effect, or midline shift.    Appearance of the bilateral optic lenses suggests the patient has   previously undergone prior cataract surgery.    Imaged paranasal sinuses, bilateral mastoid air cells, middle ear   cavities are clear.    IMPRESSION:  1. No acute intracranial hemorrhage.    2. 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 No evidence of an acute ischemic event.    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    DAWN BEHR-VENTURA MD; Attending Radiologist  This document has been electronically signed. 2022  8:25PM  ---------------------------------------------------------------------------------------------------------------   EXAM:  XR CHEST PORTABLE ROUTINE 1V                          PROCEDURE DATE:  2022      FINDINGS:    Support devices: A pacemaker overlies the left chest wall with its leads   intact.    Cardiac/mediastinum/hilum: Heart size cannot be accurately assessed on   this projection.    Lung parenchyma/Pleura: Right lower lung hazy opacities. Bibasilar   atelectasis. Trace left pleural effusion, unchanged. There is no   pneumothorax.    Skeleton/soft tissues: No acute osseous abnormalities.    IMPRESSION:    Right lower lung hazy opacities, which may represent atelectasis versus   infection.    Unchanged trace left pleural effusion.    EDITH HER MD; Resident Radiologist  This document has been electronically signed.  EVON MAN MD; Attending Radiologist  This document has been electronically signed. 2022  5:11PM  ---------------------------------------------------------------------------------------------------------------  EXAM:  XR CHEST PORTABLE ROUTINE 1V                          PROCEDURE DATE:  04/10/2022      FINDINGS:  Left pacemaker with leads in the right atrium and ventricle.    The heart size is not accurately assessed on this projection.  Bilateral lower lung field patchy opacities, left greater than right.  There is no pneumothorax. Trace left pleural effusion.    IMPRESSION:  Bilateral patchy opacities, left greater than right, differential   includes atelectasis or infection.    JAYDON MONTEMAYOR MD; Resident Radiologist  This document has been electronically signed.  SATURNINO CHERRY MD; Attending Radiologist  This document has been electronically signed. Apr 10 2022  9:14AM  --------------------------------------------------------------------------------------------  EXAM:  XR CHEST PORTABLE URGENT 1V                          PROCEDURE DATE:  2022      FINDINGS:  Left chest wall dual-lead pacemaker. Rotated exam limits assessment for   lead tip.    Small left pleural effusion with adjacent opacity. No pneumothorax.    Cardiac size cannot accurately be assessed in this projection.  Aortic   calcifications.      IMPRESSION: Small left pleural effusion with adjacent atelectasis   obscuring evaluation of the underlying lung.    DRE SANCHEZ MD; Resident Radiology  This document has been electronically signed.  WAGNER LAM MD; Attending Radiologist  This document has been electronically signed. 2022  5:54PM  ---------------------------------------------------------------------------------------------------------------  EXAM:  XR CHEST PORTABLE URGENT 1V                          PROCEDURE DATE:  2022      FINDINGS:  Left chest wall dual-lead pacemaker.  The heart is normal in size.  The lungs are clear.  There is no pneumothorax or pleural effusion.    IMPRESSION:  Clear lungs.    KENA CARRINGTON MD; Resident Radiologist  This document has been electronically signed.  SATURNINO CHERRY MD; Attending Radiologist  This document has been electronically signed. 2022 11:40AM  ---------------------------------------------------------------------------------------------------------------  EXAM:  XR CHEST PORTABLE URGENT 1V                          PROCEDURE DATE:  2022      FINDINGS:    Appropriate course of dual-chamber pacemaker. Unremarkable   cardiomediastinal silhouette. Minimal left basilar atelectasis. No   pleural effusion or pneumothorax.      IMPRESSION:    Mild left basilar atelectasis.    JOSEPH GAMINO M.D., ATTENDING RADIOGIST  This document has been electronically signed. Apr  3 2022  9:00AM  ---------------------------------------------------------------------------------------------------------------

## 2022-04-26 NOTE — CHART NOTE - NSCHARTNOTEFT_GEN_A_CORE
Pt Hb 6.9 mg/dl this AM (range over past 3 days 6.9-7.3 mg/dl). These values confidence intervals may overlap and thus may not reflect different values. However, given prior to admission hx of anemia, is certainly prudent to transfuse pt. RN staff states that new consent is necessary for transfusion. Pt has been consented for multiple operations during this admission and has agreed to transfusions if necessary. RN staff states that this consent is not valid however and pt must be reconsented for transfusion. Spoke w/ pt and family at bedside. Explained why transfusion was being suggested as well as risks and benefits. Pt did consent to transfusion however AOx2 and wife was consented for transfusion. Personally witnessed unit being hung.    Additionally, pt has failed third TOV earlier in AM. 4th TOV due at 18:30. RN staff states that pt has not urinated at this time and they will bladder scan the pt. Recommended to place hudson catheter if TOV failed. RN Laura Dacosta states that it is policy that hudson cannot be placed in calender day of removal. It has been greater than 24 hrs since the hudson was removed at the time of this writing. 9MONTI NM is adamant that a hudson cannot be placed the same calender day to decrease "the risk of CAUTI". Senior resident mentioned that instrumentation of bladder with catheter is also a risk factor for CAUTI and that additional instrumentations if hudson is necessary may not be in pt best interest. Additionally, there may be pt benefit in placing a hudson during the evening while pt is awake and oriented rather than waking him up in the middle of the night to place the hudson catheter. NM insists on proceeding with hudson placement after midnight.     ORESTES Barry, PGY-1  Vascular Surgery  [id number]

## 2022-04-26 NOTE — PROGRESS NOTE ADULT - SUBJECTIVE AND OBJECTIVE BOX
Ritesh Bell MD  Interventional Cardiology / Advance Heart Failure and Cardiac Transplant Specialist  [place] Office : [phone] [place] [address] [place] [address]  Tel:   [place] Office : [address] [facility] [place] [address]  Tel: [phone]      Subjective/Overnight events: Pt is lying in bed comfortable not in distress  	  MEDICATIONS:  furosemide   Injectable 40 milliGRAM(s) IV Push every 24 hours  heparin  Infusion 1450 Unit(s)/Hr IV Continuous <Continuous>  metoprolol succinate ER 25 milliGRAM(s) Oral daily  tamsulosin 0.8 milliGRAM(s) Oral at bedtime  warfarin 7.5 milliGRAM(s) Oral once      albuterol/ipratropium for Nebulization 3 milliLiter(s) Nebulizer every 6 hours  buDESOnide    Inhalation Suspension 0.5 milliGRAM(s) Inhalation every 12 hours  guaiFENesin ER 1200 milliGRAM(s) Oral every 12 hours  montelukast 10 milliGRAM(s) Oral daily    acetaminophen     Tablet .. 975 milliGRAM(s) Oral every 8 hours  gabapentin 600 milliGRAM(s) Oral every 8 hours  traMADol 25 milliGRAM(s) Oral every 4 hours PRN  traMADol 50 milliGRAM(s) Oral every 4 hours PRN    pantoprazole    Tablet 40 milliGRAM(s) Oral before breakfast  polyethylene glycol 3350 17 Gram(s) Oral daily  senna 2 Tablet(s) Oral at bedtime    atorvastatin 40 milliGRAM(s) Oral at bedtime  finasteride 5 milliGRAM(s) Oral daily  insulin glargine Injectable (LANTUS) 15 Unit(s) SubCutaneous at bedtime  insulin lispro (ADMELOG) corrective regimen sliding scale   SubCutaneous at bedtime  insulin lispro (ADMELOG) corrective regimen sliding scale   SubCutaneous three times a day before meals  insulin lispro Injectable (ADMELOG) 3 Unit(s) SubCutaneous before lunch  insulin lispro Injectable (ADMELOG) 3 Unit(s) SubCutaneous before dinner  insulin lispro Injectable (ADMELOG) 5 Unit(s) SubCutaneous before breakfast  levothyroxine 25 MICROGram(s) Oral daily    multivitamin/minerals 1 Tablet(s) Oral daily      PAST MEDICAL/SURGICAL HISTORY  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    Deep Vein Thrombosis (DVT)  17 yeras ago on Coumadin    CHF (congestive heart failure)  last exacerbation in 1/2017    Enlarged prostate    GERD (gastroesophageal reflux disease)    Hernia  umblical    Calculus of bile duct without cholangitis or cholecystitis without obstruction    Atrial fibrillation    S/P Hernia Repair    S/P cataract surgery, unspecified laterality    S/P ERCP  3/2017        SOCIAL HISTORY: Substance Use (street drugs): ( x ) never used  (  ) other:    FAMILY HISTORY:          PHYSICAL EXAM:  T(C): 36.4 (04-26-22 @ 13:18), Max: 37 (04-25-22 @ 16:05)  HR: 71 (04-26-22 @ 13:18) (69 - 91)  BP: 138/71 (04-26-22 @ 13:18) (122/68 - 138/71)  RR: 18 (04-26-22 @ 13:18) (18 - 20)  SpO2: 96% (04-26-22 @ 13:18) (94% - 97%)  Wt(kg): --  I&O's Summary    25 Apr 2022 07:01  -  26 Apr 2022 07:00  --------------------------------------------------------  IN: 696 mL / OUT: 1900 mL / NET: -1204 mL    26 Apr 2022 07:01  -  26 Apr 2022 14:26  --------------------------------------------------------  IN: 480 mL / OUT: 450 mL / NET: 30 mL          EYES:   PERRLA   ENMT:   Moist mucous membranes, Good dentition, No lesions  Cardiovascular: Normal S1 S2, No JVD, No murmurs, No edema  Respiratory: b/l expiratory wheeze   Gastrointestinal:  Soft, Non-tender, + BS	  Extremities: s/p right BKA                                6.9    11.82 )-----------( 152      ( 26 Apr 2022 07:23 )             23.5     04-26    144  |  105  |  45<H>  ----------------------------<  106<H>  3.9   |  26  |  1.10    Ca    8.5      26 Apr 2022 07:13  Phos  3.0     04-26  Mg     2.0     04-26      proBNP:   Lipid Profile:   HgA1c:   TSH:     Consultant(s) Notes Reviewed:  [x ] YES  [ ] NO    Care Discussed with Consultants/Other Providers [ x] YES  [ ] NO    Imaging Personally Reviewed independently:  [x] YES  [ ] NO    All labs, radiologic studies, vitals, orders and medications list reviewed. Patient is seen and examined at bedside. Case discussed with medical team.

## 2022-04-26 NOTE — PROGRESS NOTE ADULT - ASSESSMENT
86M PMH HTN, HLD, DM2 and nonhealing wounds of RLE who is non-ambulatory at home now s/p right guillotine BELOW knee amputation. Postoperative course c/b severe COPD exacerbation requiring excalation of O2 supplementation with HFNC. Now s/p R BKA completion 4/22.     - Dressing change today  - Pain control   - Continue hep gtt, bridging to warfarin  - CC Regs  - ISS  - Per podiatry re: left heel discoloration- cont with z flow boot in bed at all times, leave open to air  - Per cards: post op ok to transition to coumadin or Eliquis AND aspirin  - Home meds     Vascular Surgery  [id number]   86M PMH HTN, HLD, DM2 and nonhealing wounds of RLE who is non-ambulatory at home now s/p right guillotine BELOW knee amputation. Postoperative course c/b severe COPD exacerbation requiring excalation of O2 supplementation with HFNC. Now s/p R BKA completion 4/22.     - If patient fails TOV, replace hudson  - Dressing change today  - Pain control   - Continue hep gtt, bridging to warfarin (dosed 5mg 4/25)  - CC Regs  - ISS  - Per podiatry re: left heel discoloration- cont with z flow boot in bed at all times, leave open to air  - Per cards: post op ok to transition to coumadin or Eliquis AND aspirin  - Home meds   - PT Re-eval: VAUGHN  - PM&R eval: agree on VAUGHN  - Dispo: VAUGHN, family given choices    Vascular Surgery  [id number]

## 2022-04-26 NOTE — PROGRESS NOTE ADULT - ASSESSMENT
ASSESSMENT:    86 year old gentleman, former smoker, followed by Dr. Alicja Rob of our practice for asthma/COPD overlap  syndrome and obstructive sleep apnea being treated conservatively. He has no history of TOM colonization/infection as listed in the "past medical history". He has been stable from a pulmonary perspective and maintained on budesonide and duoneb once daily and if needed. He also has a history of HTN, HLD, DM, CKD, CVA, CHF (mild systolic dysfunction) and atrial fibrillation with sick sinus syndrome s/p pacemaker implantation. He has been followed for many months for chronic foot wounds and leg pain now with some purulence and gangrene. The pain is exacerbated when laying in bed and improves with tylenol and when hanging the legs off of the bed. He is no longer able to ambulate. 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 The patient has developed marked shortness of breath with severe hypoxemia currently requiring a nasal canula @ 5lpm to maintain saturation @ 90%. He has a cough with copious mucous in his chest which he is unable to expectorate. He has chest congestion and wheeze. No fevers, chills or sweats. No chest pain/pressure or palpitations. Called by the patient's family and asked to be involved with his pulmonary care.    acute hypoxic respiratory failure -> resolved    1) severe COPD exacerbation -> slowly improving but exacerbated by ongoing aspiration  2) 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 and respiratory muscle weakness limiting diaphragmatic excursion and chest wall expansion -> bibasilar atelectasis has improved on repeat CXR  3) no evidence of pulmonary edema or pneumonia    leukocytosis more likely related to systemic steroids and physiologic stress than infection - wonder about an underlying hematologic process    steroid induced hyperglycemia    CKD/KARLOS due to diuresis in the setting of euvolemia -> improved    4/14 - remains in the ICU; continues on an insulin infusion for steroid induced hyperglycemia; worsening of his mental status and chronic left sided weakness and left facial droop after analgesics prior to the VAC change yesterday; CT scan and EEG are unremarkable; started on zosyn for possible "sepsis"; now awake and alert sitting in the chair and back to his baseline mental status; no shortness of breath or hypoxemia on room air; occasional cough productive of scant sputum; minimal chest congestion and wheeze; no fevers, chills or sweats; no chest pain/pressure or palpitations; CXR now has bibasilar atelectasis - there is no evidence of pulmonary edema; on heparin gtt for PAD    4/15 - transferred to the surgical floor; mental status is back to baseline; left facial droop and left sided weakness are no worse than usual; continues on zosyn for possible "sepsis"; awake and alert sitting in the chair; no shortness of breath or hypoxemia on room air; occasional cough productive of scant sputum; minimal chest congestion and wheeze; no fevers, chills or sweats; no chest pain/pressure or palpitations; CXR is with a small left pleural effusion and bibasilar atelectasis - there is no evidence of pulmonary edema; on heparin gtt for PAD    4/20 -  left arm swelling ->no evidence of DVT on Duplex; FEEST revealed severe dysphagia -> non oral nutrition recommended -> the family has elected to continue oral feeding understanding the risks of aspiration; mental status is back to baseline; left facial droop and left sided weakness are no worse than usual; continues on zosyn for possible "sepsis"; awake and alert sitting in the chair; no shortness of breath or hypoxemia on room air; occasional cough productive of scant sputum; mild chest congestion and wheeze especially after eating; no fevers, chills or sweats; no chest pain/pressure or palpitations; CXR reveals improved bibasilar atelectasis - there is no evidence of pulmonary edema; on heparin gtt for PAD    4/22 - NPO and off heparin gtt awaiting AKA; awake and alert sitting up in bed; no shortness of breath or hypoxemia on room air; occasional cough productive of scant sputum; chest congestion and wheeze iis much improved and exacerbated when eating; no fevers, chills or sweats; no chest pain/pressure or palpitations; decreased left arm swelling ->no evidence of DVT on Duplex    4/25 - 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; seems sedated on an increased dose of gabapentin and "as needed" ultram for ongoing leg pain; remains on a heparin gtt now being bridged to coumadin; no shortness of breath or hypoxemia on a 2lpm nasal canula; occasional weak cough productive of scant sputum; chest congestion and wheeze is much improved and exacerbated when eating;    PLAN/RECOMMENDATIONS:    stable oxygenation on a nasal canula @ 2lpm -> trial on room air  has completed a steroid taper  albuterol/atrovent nebs q6h  pulmicort 0.5mg nebs q12h - give after duoneb - rinse mouth after use  mucinex 1200mg 2 times daily  singulair 10mg @ bedtime  acapella device/incentive spirometer  speech and swallow evaluation noted - severe dysphagia - NPO with non-oral feeding recommended - I explained the ongoing risk of aspiration with possible pneumonia, worsening bronchospasm, hypoxemia, respiratory failure etc to the patient and family - they do not want placement of a NGT or PEG and wish to continue an oral diet - written for a bite sized and easy to chew diet with moderately thickened fluids - small bites - no straws - chin tuck maneuver explained and demonstrated  cardiac meds: lipitor/toprol XL/lasix - still holding cozaar and metolazone  flomax/proscar  EEG -> mild to moderate nonspecific diffuse or multifocal cerebral dysfunction -  no epileptiform patterns or seizures recorded.  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, stable in appearance compared with prior dated 9/8/2019  CXR 4/18 - improving bilateral lower lobe opacities (atelectasis)  off zosyn - ID evaluation noted - leukocytosis has resolved off steroids  vascular surgery follow-up    heparin gtt -> bridging to coumadin (or ASA and eliquis)    pain control - gabapentin (dose decreased)/tylenol/naprosyn - patient is sedated and confused on an increased dose of gabapentin    s/p completion BKA    wound care  GI prophylaxis - protonix  proscar/flomax  bowel regimen  glucose control  out of bed and into the chair  LUE Duplex is without DVT    Will follow with you. Plan of care discussed with the patient and his family at bedside and with Dr. Spaulding.    Sarabjit Wright MD, [facility]  [phone]  Pulmonary Medicine

## 2022-04-27 NOTE — PROVIDER CONTACT NOTE (CRITICAL VALUE NOTIFICATION) - RECOMMENDATIONS
Notify provider, suggest holding heparin for an hour and changing rate per nomogram guidelines
hold heparin drip for 1 hr and decrease rate

## 2022-04-27 NOTE — PROGRESS NOTE ADULT - ASSESSMENT
86M PMH HTN, HLD, DM2 and nonhealing wounds of RLE who is non-ambulatory at home now s/p right guillotine BELOW knee amputation. Postoperative course c/b severe COPD exacerbation requiring excalation of O2 supplementation with HFNC. Now s/p R BKA completion 4/22. Received 1U yesterday. Pt has failed numerous TOV. Eventual placement of hudson catheter.     - F/u Hb  - Dressing change today  - Pain control   - Continue hep gtt, bridging to warfarin (dosed 7.55mg 4/26)  - CC Regs  - ISS  - Per podiatry re: left heel discoloration- cont with z flow boot in bed at all times, leave open to air  - Per cards: post op ok to transition to coumadin or Eliquis AND aspirin  - Home meds   - PT Re-eval: VAUGHN  - PM&R eval: agree on VAUGHN  - Dispo: VAUGHN, family given choices    Vascular Surgery  [id number]   86M PMH HTN, HLD, DM2 and nonhealing wounds of RLE who is non-ambulatory at home now s/p right guillotine BELOW knee amputation. Postoperative course c/b severe COPD exacerbation requiring excalation of O2 supplementation with HFNC. Now s/p R BKA completion 4/22. Received 1U yesterday. Pt has failed numerous TOV. Eventual placement of hudson catheter.     - F/u Hb  - Dressing change today  - Pain control   - Continue hep gtt, bridging to warfarin (dosed 7.55mg 4/26)  - CC Regs  - Hudson in place will be dc with Hudson   - ISS  - Per podiatry re: left heel discoloration- cont with z flow boot in bed at all times, leave open to air  - Per cards: post op ok to transition to coumadin or Eliquis AND aspirin  - Home meds   - PT Re-eval: VAUGHN  - PM&R eval: agree on VAUGHN  - Dispo: VAUGHN, family given choices    Vascular Surgery  [id number]

## 2022-04-27 NOTE — PROGRESS NOTE ADULT - SUBJECTIVE AND OBJECTIVE BOX
Ritesh Bell MD  Interventional Cardiology / Advance Heart Failure and Cardiac Transplant Specialist  [place] Office : [address] [place] [address] [place]. [address]  Tel:   [place] Office : [address] [facility] [place] [address]  Tel: [phone]      Subjective/Overnight events: Pt is lying in bed comfortable not in distress    MEDICATIONS:  furosemide    Tablet 20 milliGRAM(s) Oral two times a day  heparin  Infusion 1450 Unit(s)/Hr IV Continuous <Continuous>  metoprolol succinate ER 25 milliGRAM(s) Oral daily  tamsulosin 0.8 milliGRAM(s) Oral at bedtime      albuterol/ipratropium for Nebulization 3 milliLiter(s) Nebulizer every 6 hours  buDESOnide    Inhalation Suspension 0.5 milliGRAM(s) Inhalation every 12 hours  guaiFENesin ER 1200 milliGRAM(s) Oral every 12 hours  montelukast 10 milliGRAM(s) Oral daily    acetaminophen     Tablet .. 975 milliGRAM(s) Oral every 8 hours  gabapentin 300 milliGRAM(s) Oral three times a day  naproxen 250 milliGRAM(s) Oral every 12 hours  traMADol 25 milliGRAM(s) Oral every 4 hours PRN    pantoprazole    Tablet 40 milliGRAM(s) Oral before breakfast  polyethylene glycol 3350 17 Gram(s) Oral daily  senna 2 Tablet(s) Oral at bedtime    atorvastatin 40 milliGRAM(s) Oral at bedtime  finasteride 5 milliGRAM(s) Oral daily  insulin glargine Injectable (LANTUS) 13 Unit(s) SubCutaneous at bedtime  insulin lispro (ADMELOG) corrective regimen sliding scale   SubCutaneous at bedtime  insulin lispro (ADMELOG) corrective regimen sliding scale   SubCutaneous three times a day before meals  insulin lispro Injectable (ADMELOG) 3 Unit(s) SubCutaneous before lunch  insulin lispro Injectable (ADMELOG) 3 Unit(s) SubCutaneous before dinner  insulin lispro Injectable (ADMELOG) 4 Unit(s) SubCutaneous before breakfast  levothyroxine 25 MICROGram(s) Oral daily    multivitamin/minerals 1 Tablet(s) Oral daily  potassium phosphate / sodium phosphate Powder (PHOS-NaK) 1 Packet(s) Oral once      PAST MEDICAL/SURGICAL HISTORY  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    Deep Vein Thrombosis (DVT)  17 yeras ago on Coumadin    CHF (congestive heart failure)  last exacerbation in 1/2017    Enlarged prostate    GERD (gastroesophageal reflux disease)    Hernia  umblical    Calculus of bile duct without cholangitis or cholecystitis without obstruction    Atrial fibrillation    S/P Hernia Repair    S/P cataract surgery, unspecified laterality    S/P ERCP  3/2017        SOCIAL HISTORY: Substance Use (street drugs): ( x ) never used  (  ) other:    FAMILY HISTORY:        PHYSICAL EXAM:  T(C): 36.3 (04-27-22 @ 06:19), Max: 37 (04-26-22 @ 21:33)  HR: 86 (04-27-22 @ 06:19) (71 - 90)  BP: 149/71 (04-27-22 @ 06:19) (133/72 - 151/74)  RR: 18 (04-27-22 @ 06:19) (18 - 18)  SpO2: 97% (04-27-22 @ 06:19) (94% - 97%)  Wt(kg): --  I&O's Summary    26 Apr 2022 07:01  -  27 Apr 2022 07:00  --------------------------------------------------------  IN: 1536 mL / OUT: 2050 mL / NET: -514 mL          EYES:   PERRLA   ENMT:   Moist mucous membranes, Good dentition, No lesions  Cardiovascular: Normal S1 S2, No JVD, No murmurs, No edema  Respiratory: b/l expiratory wheeze   Gastrointestinal:  Soft, Non-tender, + BS	  Extremities: s/p right BKA                                8.2    10.71 )-----------( 127      ( 27 Apr 2022 06:47 )             26.3     04-27    142  |  105  |  39<H>  ----------------------------<  164<H>  3.9   |  26  |  0.99    Ca    8.5      27 Apr 2022 06:44  Phos  2.8     04-27  Mg     2.0     04-27      proBNP:   Lipid Profile:   HgA1c:   TSH:     Consultant(s) Notes Reviewed:  [x ] YES  [ ] NO    Care Discussed with Consultants/Other Providers [ x] YES  [ ] NO    Imaging Personally Reviewed independently:  [x] YES  [ ] NO    All labs, radiologic studies, vitals, orders and medications list reviewed. Patient is seen and examined at bedside. Case discussed with medical team.

## 2022-04-27 NOTE — PROVIDER CONTACT NOTE (CRITICAL VALUE NOTIFICATION) - ACTION/TREATMENT ORDERED:
hold heparin for 1 hr and decrease rate
Dr zayas, pending orders for new rate/adjustment.
MD aware, reviewed other lab values drawn during RRT.  Requesting RN to hold heparin gtt for one hour and restart at new ordered rate. MD to come to bedside to evaluate.

## 2022-04-27 NOTE — PROVIDER CONTACT NOTE (CRITICAL VALUE NOTIFICATION) - SITUATION
MD Osborn
Aptt 191.3
Pt with critical aPTT level of 133.4 - pt is s/p RRT this evening for hypoglycemia, nonresponsive
HG 6.9 and HCT 23.5

## 2022-04-27 NOTE — PROGRESS NOTE ADULT - SUBJECTIVE AND OBJECTIVE BOX
GENERAL SURGERY PROGRESS NOTE   ___________________________________________________________________    LIBRA PEÑA | [id number] | 87y Male | [facility] 9MON [address] W1 | LOS 26d    Attending: Anmol Quinn    ___________________________________________________________________    CC: Patient is a 87y old  Male who presents with a chief complaint of Preoperative Planning for Right above knee amputation (2022 19:20)      SUBJECTIVE:   Patient seen today during morning rounds at bedside and found to be without acute distress. Denies chest pain, fever, severe pain, or SOB.     Overnight: Unremarkable    Allegies:  NKDA    OBJECTIVE:  Vitals:    T(C): 36.8 (22 @ 00:44), Max: 37 (22 @ 21:33)  HR: 86 (22 @ 00:44) (71 - 90)  BP: 133/72 (22 @ 00:44) (133/72 - 151/74)  RR: 18 (22 @ 00:44) (18 - 18)  SpO2: 97% (22 @ 00:44) (94% - 97%)      OUT:    Post-Void Residual per Intermittent Catheterization (mL): 1900 mL  Total OUT: 1900 mL      OUT:    Post-Void Residual per Intermittent Catheterization (mL): 1450 mL  Total OUT: 1450 mL        Physical Exam:   Gen: NAD   CV: Regular rate  Resp: Nonlabored breathing with face mask in place  Ext: R BKA stump with operative dressing in place that is c/d/i, no strikethrough noted, stump appears to be healing well, knee immobilizer in place    Medications:  heparin  Infusion 1450 IV Continuous <Continuous>    acetaminophen     Tablet .. 975 milliGRAM(s) Oral every 8 hours  albuterol/ipratropium for Nebulization 3 milliLiter(s) Nebulizer every 6 hours  buDESOnide    Inhalation Suspension 0.5 milliGRAM(s) Inhalation every 12 hours  furosemide    Tablet 20 milliGRAM(s) Oral two times a day  gabapentin 300 milliGRAM(s) Oral three times a day  guaiFENesin ER 1200 milliGRAM(s) Oral every 12 hours  metoprolol succinate ER 25 milliGRAM(s) Oral daily  montelukast 10 milliGRAM(s) Oral daily  naproxen 250 milliGRAM(s) Oral every 12 hours  pantoprazole    Tablet 40 milliGRAM(s) Oral before breakfast  polyethylene glycol 3350 17 Gram(s) Oral daily  senna 2 Tablet(s) Oral at bedtime  tamsulosin 0.8 milliGRAM(s) Oral at bedtime        Laboratory:  WBC: 11.82 H&H: 6.9/23.5 Plt: 152  WBC: 15.28 H&H: 7.3/24.7 Plt: 180    Chemistry:                               Phos: 3.0 M.0  144  |  105  |  45  ----------------------------<  106  3.9   |  26  |  1.10        ,                              Phos: 3.1 M.0  145  |  106  |  53  ----------------------------<  128  4.0   |  26  |  1.24          PTT 76.2 PT/INR 13.7/1.18          Reviewed laboratory and imaging     GENERAL SURGERY PROGRESS NOTE   ___________________________________________________________________    LIBRA PEÑA | [id number] | 87y Male | [facility] 9MON 909 W1 | LOS 26d    Attending: Anmol Quinn    ___________________________________________________________________    CC: Patient is a 87y old  Male who presents with a chief complaint of Preoperative Planning for Right above knee amputation (2022 19:20)      SUBJECTIVE:   Patient seen today during morning rounds at bedside and found to be without acute distress. He states he does not feel great this am. He Denies chest pain, fever, severe pain, or SOB.     Overnight: Unremarkable    Allegies:  NKDA    OBJECTIVE:  Vitals:    T(C): 36.8 (22 @ 00:44), Max: 37 (22 @ 21:33)  HR: 86 (22 @ 00:44) (71 - 90)  BP: 133/72 (22 @ 00:44) (133/72 - 151/74)  RR: 18 (22 @ 00:44) (18 - 18)  SpO2: 97% (22 @ 00:44) (94% - 97%)      OUT:    Post-Void Residual per Intermittent Catheterization (mL): 1900 mL  Total OUT: 1900 mL      OUT:    Post-Void Residual per Intermittent Catheterization (mL): 1450 mL  Total OUT: 1450 mL        Physical Exam:   Gen: NAD   CV: Regular rate  Resp: Nonlabored breathing with face mask in place  Ext: R BKA stump with operative dressing in place that is c/d/i, no strikethrough noted, stump appears swollen, erythematous and with some skin discoloration along the stump line, staples are intact with out any purulent or serosanguinous discharge, knee immobilizer in place    Medications:  heparin  Infusion 1450 IV Continuous <Continuous>    acetaminophen     Tablet .. 975 milliGRAM(s) Oral every 8 hours  albuterol/ipratropium for Nebulization 3 milliLiter(s) Nebulizer every 6 hours  buDESOnide    Inhalation Suspension 0.5 milliGRAM(s) Inhalation every 12 hours  furosemide    Tablet 20 milliGRAM(s) Oral two times a day  gabapentin 300 milliGRAM(s) Oral three times a day  guaiFENesin ER 1200 milliGRAM(s) Oral every 12 hours  metoprolol succinate ER 25 milliGRAM(s) Oral daily  montelukast 10 milliGRAM(s) Oral daily  naproxen 250 milliGRAM(s) Oral every 12 hours  pantoprazole    Tablet 40 milliGRAM(s) Oral before breakfast  polyethylene glycol 3350 17 Gram(s) Oral daily  senna 2 Tablet(s) Oral at bedtime  tamsulosin 0.8 milliGRAM(s) Oral at bedtime        Laboratory:  WBC: 11.82 H&H: 6.9/23.5 Plt: 152  WBC: 15.28 H&H: 7.3/24.7 Plt: 180    Chemistry:                               Phos: 3.0 M.0  144  |  105  |  45  ----------------------------<  106  3.9   |  26  |  1.10        ,                              Phos: 3.1 M.0  145  |  106  |  53  ----------------------------<  128  4.0   |  26  |  1.24          PTT 76.2 PT/INR 13.7/1.18          Reviewed laboratory and imaging

## 2022-04-27 NOTE — PROGRESS NOTE ADULT - SUBJECTIVE AND OBJECTIVE BOX
Patient is a 87y old  Male who presents with a chief complaint of Preoperative Planning for Right above knee amputation (2022 09:25)      SUBJECTIVE / OVERNIGHT EVENTS:    Events noted.  CONSTITUTIONAL: No fever,  or fatigue  RESPIRATORY: On supp O2  CARDIOVASCULAR: No chest pain, palpitations  GASTROINTESTINAL: No abdominal or epigastric pain.       MEDICATIONS  (STANDING):  acetaminophen     Tablet .. 975 milliGRAM(s) Oral every 6 hours  albuterol/ipratropium for Nebulization 3 milliLiter(s) Nebulizer every 6 hours  atorvastatin 40 milliGRAM(s) Oral at bedtime  buDESOnide    Inhalation Suspension 0.5 milliGRAM(s) Inhalation every 12 hours  finasteride 5 milliGRAM(s) Oral daily  furosemide    Tablet 20 milliGRAM(s) Oral two times a day  furosemide   Injectable 15 milliGRAM(s) IV Push once  guaiFENesin ER 1200 milliGRAM(s) Oral every 12 hours  heparin  Infusion 1550 Unit(s)/Hr (15.5 mL/Hr) IV Continuous <Continuous>  insulin glargine Injectable (LANTUS) 10 Unit(s) SubCutaneous at bedtime  insulin lispro (ADMELOG) corrective regimen sliding scale   SubCutaneous at bedtime  insulin lispro (ADMELOG) corrective regimen sliding scale   SubCutaneous three times a day before meals  levothyroxine 25 MICROGram(s) Oral daily  metoprolol succinate ER 25 milliGRAM(s) Oral daily  montelukast 10 milliGRAM(s) Oral daily  multivitamin/minerals 1 Tablet(s) Oral daily  naproxen 250 milliGRAM(s) Oral every 12 hours  pantoprazole    Tablet 40 milliGRAM(s) Oral before breakfast  piperacillin/tazobactam IVPB.. 3.375 Gram(s) IV Intermittent every 8 hours  polyethylene glycol 3350 17 Gram(s) Oral daily  senna 2 Tablet(s) Oral at bedtime  tamsulosin 0.8 milliGRAM(s) Oral at bedtime  vancomycin  IVPB 1250 milliGRAM(s) IV Intermittent every 12 hours  warfarin 7.5 milliGRAM(s) Oral once    MEDICATIONS  (PRN):  traMADol 25 milliGRAM(s) Oral every 4 hours PRN Moderate Pain (4 - 6)        CAPILLARY BLOOD GLUCOSE      POCT Blood Glucose.: 123 mg/dL (2022 21:17)  POCT Blood Glucose.: 108 mg/dL (2022 19:30)  POCT Blood Glucose.: 144 mg/dL (2022 18:39)  POCT Blood Glucose.: 217 mg/dL (2022 17:58)  POCT Blood Glucose.: 53 mg/dL (2022 17:43)  POCT Blood Glucose.: 54 mg/dL (2022 17:42)  POCT Blood Glucose.: 160 mg/dL (2022 13:50)  POCT Blood Glucose.: 166 mg/dL (2022 13:12)  POCT Blood Glucose.: 183 mg/dL (2022 10:13)    I&O's Summary    2022 07:01  -  2022 07:00  --------------------------------------------------------  IN: 1536 mL / OUT: 2050 mL / NET: -514 mL    2022 07:01  -  2022 22:02  --------------------------------------------------------  IN: 120 mL / OUT: 800 mL / NET: -680 mL        T(C): 36.4 (22 @ 20:50), Max: 37.1 (22 @ 10:15)  HR: 77 (22 @ 20:50) (60 - 86)  BP: 148/67 (22 @ 20:50) (122/62 - 149/71)  RR: 18 (22 @ 20:50) (18 - 18)  SpO2: 97% (22 @ 20:50) (94% - 97%)    PHYSICAL EXAM:    NECK: Supple, No JVD  CHEST/LUNG: Clear to auscultation bilaterally; No wheezing.  HEART: Regular rate and rhythm; No murmurs, rubs, or gallops  ABDOMEN: Soft, Nontender, Nondistended; Bowel sounds present  EXTREMITIES:   No edema  NEUROLOGY: AAO       LABS:                        7.8    8.42  )-----------( 104      ( 2022 18:27 )             26.4         144  |  106  |  39<H>  ----------------------------<  157<H>  3.7   |  26  |  0.98    Ca    8.4      2022 18:27  Phos  3.6       Mg     2.0         TPro  5.3<L>  /  Alb  2.2<L>  /  TBili  0.5  /  DBili  x   /  AST  9<L>  /  ALT  14  /  AlkPhos  55      PT/INR - ( 2022 18:27 )   PT: 16.9 sec;   INR: 1.45 ratio         PTT - ( 2022 18:27 )  PTT:133.4 sec  CARDIAC MARKERS ( 2022 20:08 )  x     / x     / x     / x     / 2.9 ng/mL      Urinalysis Basic - ( 2022 02:47 )    Color: Yellow / Appearance: Clear / S.021 / pH: x  Gluc: x / Ketone: Negative  / Bili: Negative / Urobili: Negative   Blood: x / Protein: 30 mg/dL / Nitrite: Negative   Leuk Esterase: Negative / RBC: 12 /hpf / WBC 2 /HPF   Sq Epi: x / Non Sq Epi: 0 /hpf / Bacteria: Negative      CAPILLARY BLOOD GLUCOSE      POCT Blood Glucose.: 123 mg/dL (2022 21:17)  POCT Blood Glucose.: 108 mg/dL (2022 19:30)  POCT Blood Glucose.: 144 mg/dL (2022 18:39)  POCT Blood Glucose.: 217 mg/dL (2022 17:58)  POCT Blood Glucose.: 53 mg/dL (2022 17:43)  POCT Blood Glucose.: 54 mg/dL (2022 17:42)  POCT Blood Glucose.: 160 mg/dL (2022 13:50)  POCT Blood Glucose.: 166 mg/dL (2022 13:12)  POCT Blood Glucose.: 183 mg/dL (2022 10:13)        RADIOLOGY & ADDITIONAL TESTS:    Imaging Personally Reviewed:    Consultant(s) Notes Reviewed:      Care Discussed with Consultants/Other Providers:    Graham Pulliam MD, CMD, FACP    [address] [place] [address]  Office Tel: [phone]  Cell: [phone]

## 2022-04-27 NOTE — PROVIDER CONTACT NOTE (CRITICAL VALUE NOTIFICATION) - ASSESSMENT
Aptt 191.3. heparin gtt currently ordered for 12ml/hr, pt specific nomogram. no signs of bleeding noted.
NO INTERVENTION AT THIS TIME
Pt remains lethargic, heparin gtt in progress at 18ml/hr, currently on coumadin bridge ordered for 7.5mg this evening
VSS, no s/s of bleeding noted

## 2022-04-27 NOTE — PROGRESS NOTE ADULT - ASSESSMENT
ASSESSMENT:    86 year old gentleman, former smoker, followed by Dr. Alicja Rob of our practice for asthma/COPD overlap  syndrome and obstructive sleep apnea being treated conservatively. He has no history of TOM colonization/infection as listed in the "past medical history". He has been stable from a pulmonary perspective and maintained on budesonide and duoneb once daily and if needed. He also has a history of HTN, HLD, DM, CKD, CVA, CHF (mild systolic dysfunction) and atrial fibrillation with sick sinus syndrome s/p pacemaker implantation. He has been followed for many months for chronic foot wounds and leg pain now with some purulence and gangrene. The pain is exacerbated when laying in bed and improves with tylenol and when hanging the legs off of the bed. He is no longer able to ambulate. 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 The patient has developed marked shortness of breath with severe hypoxemia currently requiring a nasal canula @ 5lpm to maintain saturation @ 90%. He has a cough with copious mucous in his chest which he is unable to expectorate. He has chest congestion and wheeze. No fevers, chills or sweats. No chest pain/pressure or palpitations. Called by the patient's family and asked to be involved with his pulmonary care.    acute hypoxic respiratory failure -> resolved    1) severe COPD exacerbation -> slowly improving but exacerbated by ongoing aspiration  2) 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 and respiratory muscle weakness limiting diaphragmatic excursion and chest wall expansion -> bibasilar atelectasis has improved on repeat CXR  3) no evidence of pulmonary edema or pneumonia    leukocytosis     steroid induced hyperglycemia    CKD/KARLOS due to diuresis in the setting of euvolemia -> improved    4/14 - remains in the ICU; continues on an insulin infusion for steroid induced hyperglycemia; worsening of his mental status and chronic left sided weakness and left facial droop after analgesics prior to the VAC change yesterday; CT scan and EEG are unremarkable; started on zosyn for possible "sepsis"; now awake and alert sitting in the chair and back to his baseline mental status; no shortness of breath or hypoxemia on room air; occasional cough productive of scant sputum; minimal chest congestion and wheeze; no fevers, chills or sweats; no chest pain/pressure or palpitations; CXR now has bibasilar atelectasis - there is no evidence of pulmonary edema; on heparin gtt for PAD    4/15 - transferred to the surgical floor; mental status is back to baseline; left facial droop and left sided weakness are no worse than usual; continues on zosyn for possible "sepsis"; awake and alert sitting in the chair; no shortness of breath or hypoxemia on room air; occasional cough productive of scant sputum; minimal chest congestion and wheeze; no fevers, chills or sweats; no chest pain/pressure or palpitations; CXR is with a small left pleural effusion and bibasilar atelectasis - there is no evidence of pulmonary edema; on heparin gtt for PAD    4/20 -  left arm swelling ->no evidence of DVT on Duplex; FEEST revealed severe dysphagia -> non oral nutrition recommended -> the family has elected to continue oral feeding understanding the risks of aspiration; mental status is back to baseline; left facial droop and left sided weakness are no worse than usual; continues on zosyn for possible "sepsis"; awake and alert sitting in the chair; no shortness of breath or hypoxemia on room air; occasional cough productive of scant sputum; mild chest congestion and wheeze especially after eating; no fevers, chills or sweats; no chest pain/pressure or palpitations; CXR reveals improved bibasilar atelectasis - there is no evidence of pulmonary edema; on heparin gtt for PAD    4/22 - NPO and off heparin gtt awaiting AKA; awake and alert sitting up in bed; no shortness of breath or hypoxemia on room air; occasional cough productive of scant sputum; chest congestion and wheeze iis much improved and exacerbated when eating; no fevers, chills or sweats; no chest pain/pressure or palpitations; decreased left arm swelling ->no evidence of DVT on Duplex    4/25 - 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; seems sedated on an increased dose of gabapentin and "as needed" ultram for ongoing leg pain; remains on a heparin gtt now being bridged to coumadin; no shortness of breath or hypoxemia on a 2lpm nasal canula; occasional weak cough productive of scant sputum; chest congestion and wheeze is much improved and exacerbated when eating;    PLAN/RECOMMENDATIONS:    stable oxygenation on a nasal canula @ 2lpm -> trial on room air  has completed a steroid taper  albuterol/atrovent nebs q6h  pulmicort 0.5mg nebs q12h - give after duoneb - rinse mouth after use  mucinex 1200mg 2 times daily  singulair 10mg @ bedtime  acapella device/incentive spirometer  speech and swallow evaluation noted - severe dysphagia - NPO with non-oral feeding recommended - I explained the ongoing risk of aspiration with possible pneumonia, worsening bronchospasm, hypoxemia, respiratory failure etc to the patient and family - they do not want placement of a NGT or PEG and wish to continue an oral diet - written for a bite sized and easy to chew diet with moderately thickened fluids - small bites - no straws - chin tuck maneuver explained and demonstrated  cardiac meds: lipitor/toprol XL/lasix - still holding cozaar and metolazone  flomax/proscar -> hudson replaced due to urinary retention  EEG -> mild to moderate nonspecific diffuse or multifocal cerebral dysfunction -  no epileptiform patterns or seizures recorded.  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, stable in appearance compared with prior dated 9/8/2019  CXR 4/18 - improving bilateral lower lobe opacities (atelectasis)  off zosyn - ID evaluation noted - leukocytosis has resolved off steroids  vascular surgery follow-up    heparin gtt -> bridging to coumadin (or ASA and eliquis)    pain control - gabapentin (dose decreased)/tylenol/naprosyn - following mental status, liver function test, hemodynamics and H/H    s/p completion BKA    wound care  GI prophylaxis - protonix  bowel regimen  glucose control  out of bed and into the chair  LUE Duplex is without DVT    Will follow with you. Plan of care discussed with the patient and his family at bedside and with vascular surgery. Discharge planning.    Sarabjit Wright MD, [facility]  [phone]  Pulmonary Medicine

## 2022-04-27 NOTE — CHART NOTE - NSCHARTNOTEFT_GEN_A_CORE
Nutrition Follow Up Note  Patient seen for: nutrition follow-up    Chart reviewed, events noted. This is a "86M PMH HTN, HLD, DM2 and nonhealing wounds of RLE who is non-ambulatory at home now s/p right guillotine BELOW knee amputation. Postoperative course c/b severe COPD exacerbation requiring excalation of O2 supplementation with HFNC. Now s/p R BKA completion ."    Source: [x] Patient --confused at times       [x] EMR        [] RN        [] Family at bedside       [] Other:    -If unable to interview patient: [] Trach/Vent/BiPAP  [] Disoriented/confused/inappropriate to interview    Diet Order: Soft and Bit Sized Diet with Moderately thick liquids, + carbohydrate consistent with evening snack     - Is current order appropriate/adequate? [x Yes  []  No:     - PO intake :   [x] >75%  Adequate    [] 50-75%  Fair       [] <50%  Poor    - Nutrition-related concerns:  -pt initially on regular textured diet with thin liquids from -, noted with AMS, placed on regular diet with moderately thick liquid on -. Pt seen by SLP on  due to coughing during PO intake, recommend to remain NPO pending FEES. Pt s/p FEES on , recommended to remain NPO with alternative means for nutrition.   -Per resident chart note  Family had goals of care discussion about swallowing difficulty, does not wish to proceed with a PEG tube at this time" family in agreement for PO diet despite risks of aspiration.   - Pt on pureed diet with moderately thick liquids on , now on soft and bite sized diet with moderately thick liquids since . Pt tolerating diet well, requires assistance with meals.     GI:  Last BM ___.   Bowel Regimen? [] Yes   [] No      Weights:   Daily Weight: 205lbs (-), Weight: 217.6lbs (-)  dosing weight 203lbs--prior to BKA      Nutritionally Pertinent MEDICATIONS  (STANDING):  atorvastatin  finasteride  furosemide    Tablet  insulin glargine Injectable (LANTUS)  insulin lispro (ADMELOG) corrective regimen sliding scale  insulin lispro (ADMELOG) corrective regimen sliding scale  insulin lispro Injectable (ADMELOG)  insulin lispro Injectable (ADMELOG)  insulin lispro Injectable (ADMELOG)  levothyroxine  metoprolol succinate ER  multivitamin/minerals  pantoprazole    Tablet  polyethylene glycol 3350  senna  tamsulosin    Pertinent Labs:  @ 06:44: Na 142, BUN 39<H>, Cr 0.99, <H>, K+ 3.9, Phos 2.8, Mg 2.0, Alk Phos --, ALT/SGPT --, AST/SGOT --, HbA1c --    A1C with Estimated Average Glucose Result: 6.8 % (22 @ 12:21)    Finger Sticks:  POCT Blood Glucose.: 160 mg/dL ( @ 13:50)  POCT Blood Glucose.: 166 mg/dL ( @ 13:12)  POCT Blood Glucose.: 183 mg/dL ( @ 10:13)  POCT Blood Glucose.: 174 mg/dL ( @ 21:33)  POCT Blood Glucose.: 130 mg/dL ( @ 18:25)      Skin per nursing documentation: noted with "blanchable erythema" to ccocyx, right BKA  Edema: +1 generalized, +2 left hand     Estimated Needs:   [] no change since previous assessment  [x] recalculated: with consideration for BKA, IBW adjusted for BKA used for calculations   30-35Kcal/kKcal   1.2-1.4gm/k-94gm     Previous Nutrition Diagnosis: Increased Nutrient Needs  Nutrition Diagnosis is: [x] ongoing  [] resolved [] not applicable     New Nutrition Diagnosis: [x] Not applicable    Nutrition Care Plan:  [x] In Progress  [] Achieved  [] Not applicable    Nutrition Interventions:     Education Provided:       [] Yes:  [x] No:        Recommendations:         [x] Continue current diet order. Defer diet texture/consistency to team      [x] Add oral nutrition supplement:  Mighty Shake (honey thick) TID      [x] Continue current micronutrient supplementation: multivitamin, consider addition of vitamin C     [x] RD to remain available and follow-up as medically appropriate.     Monitoring and Evaluation:   Continue to monitor nutritional intake, tolerance to diet prescription, weights, labs, skin integrity      RD remains available upon request and will follow up per protocol  Latisha Mitchell RD, CDN, Pager # [phone]

## 2022-04-27 NOTE — CHART NOTE - NSCHARTNOTEFT_GEN_A_CORE
BG stable until this pm with hypoglycemia.  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 BG goal (100-180mg/dl). s/p completed prednisone taper. Noted hypoglycemic event this pm with BG 50s corrected to 100s. Spoke to RN who stated pt didn't eat much for lunch but he received premeal insulin doses (4 units). Will discontinue premeal doses for now until pt able to eat consistently . Will preventively decrease Lantus dose as well.     POCT Blood Glucose.: 144 mg/dL (04-27-22 @ 18:39)  POCT Blood Glucose.: 217 mg/dL (04-27-22 @ 17:58)  POCT Blood Glucose.: 53 mg/dL (04-27-22 @ 17:43)  POCT Blood Glucose.: 54 mg/dL (04-27-22 @ 17:42)  POCT Blood Glucose.: 160 mg/dL (04-27-22 @ 13:50)  POCT Blood Glucose.: 166 mg/dL (04-27-22 @ 13:12)  POCT Blood Glucose.: 183 mg/dL (04-27-22 @ 10:13)  POCT Blood Glucose.: 174 mg/dL (04-26-22 @ 21:33)  POCT Blood Glucose.: 130 mg/dL (04-26-22 @ 18:25)  POCT Blood Glucose.: 117 mg/dL (04-26-22 @ 12:31)  POCT Blood Glucose.: 122 mg/dL (04-26-22 @ 08:41)  POCT Blood Glucose.: 191 mg/dL (04-25-22 @ 21:19)         PLAN:  -test BG AC/HS  -Decrease Lantus dose to 10 units  -Discontinue Admelog ac meals for now  -c/w Admelog low dose correction scale AC and HS scale  Discharge:   Outpt. endo follow-up  Outpt. optho, podiatry, nephrology  Plan to d/c on basal + orals including SGLT2 given CKD. Would avoid metformin and sulfonylurea.  -dispo planning to rehab- insulin TBD

## 2022-04-27 NOTE — PROVIDER CONTACT NOTE (OTHER) - SITUATION
Adair Catheter inserted prior to DTV time at 0300. Provider was made aware that next DTV time was at 0300 as per last straight cath insertion.

## 2022-04-27 NOTE — PROGRESS NOTE ADULT - SUBJECTIVE AND OBJECTIVE BOX
[facility] - PROGRESS NOTE    CHIEF COMPLAINT: acute hypoxic respiratory failure; COPD exacerbation; mucous plugging; atelectasis; weak cough; pleural effusion; dysphagia; right foot gangrene s/p BKA    INTERVAL HISTORY: s/p completion right BKA ; awake and alert on a reduced dose of gabapentin; significant right leg pain continues; remains on a heparin gtt being bridged to coumadin; no shortness of breath or hypoxemia on a 2lpm nasal canula; occasional weak cough productive of scant sputum; persistent chest congestion and wheeze is exacerbated after eating; no fevers, chills or sweats; no chest pain/pressure or palpitations; FEEST revealed severe dysphagia -> non oral nutrition recommended -> the family has elected to continue oral feeding understanding the risks of aspiration (small bites and easy to chew diet with moderately thickened fluids ordered); left facial droop and left sided weakness are at baseline); received 1 unit PRBCs  for post-operative anemia; hudson remains in place due to urinary retention      REVIEW OF SYSTEMS:  Constitutional: As per interval history  HEENT: Within normal limits  CV: As per interval history  Resp: As per interval history  GI: dysphagia  : Within normal limits  Musculoskeletal: Within normal limits  Skin: Within normal limits  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 - left facial droop - dysphagia  Psychiatric: Within normal limits  Endocrine: Within normal limits  Hematologic/Lymphatic: Within normal limits  Allergic/Immunologic: Within normal limits    MEDICATIONS:     Pulmonary "  albuterol/ipratropium for Nebulization 3 milliLiter(s) Nebulizer every 6 hours  buDESOnide    Inhalation Suspension 0.5 milliGRAM(s) Inhalation every 12 hours  guaiFENesin ER 1200 milliGRAM(s) Oral every 12 hours  montelukast 10 milliGRAM(s) Oral daily    Anti-microbials:    Cardiovascular:  furosemide    Tablet 20 milliGRAM(s) Oral two times a day  metoprolol succinate ER 25 milliGRAM(s) Oral daily  tamsulosin 0.8 milliGRAM(s) Oral at bedtime    Other:  acetaminophen     Tablet .. 975 milliGRAM(s) Oral every 8 hours  atorvastatin 40 milliGRAM(s) Oral at bedtime  finasteride 5 milliGRAM(s) Oral daily  gabapentin 300 milliGRAM(s) Oral three times a day  heparin  Infusion 1450 Unit(s)/Hr IV Continuous <Continuous>  insulin glargine Injectable (LANTUS) 13 Unit(s) SubCutaneous at bedtime  insulin lispro (ADMELOG) corrective regimen sliding scale   SubCutaneous at bedtime  insulin lispro (ADMELOG) corrective regimen sliding scale   SubCutaneous three times a day before meals  insulin lispro Injectable (ADMELOG) 4 Unit(s) SubCutaneous before breakfast  insulin lispro Injectable (ADMELOG) 3 Unit(s) SubCutaneous before lunch  insulin lispro Injectable (ADMELOG) 3 Unit(s) SubCutaneous before dinner  levothyroxine 25 MICROGram(s) Oral daily  multivitamin/minerals 1 Tablet(s) Oral daily  naproxen 250 milliGRAM(s) Oral every 12 hours  pantoprazole    Tablet 40 milliGRAM(s) Oral before breakfast  polyethylene glycol 3350 17 Gram(s) Oral daily  senna 2 Tablet(s) Oral at bedtime    MEDICATIONS  (PRN):  traMADol 25 milliGRAM(s) Oral every 4 hours PRN Moderate Pain (4 - 6)    OBJECTIVE:    POCT Blood Glucose.: 174 mg/dL (2022 21:33)  POCT Blood Glucose.: 130 mg/dL (2022 18:25)  POCT Blood Glucose.: 117 mg/dL (2022 12:31)  POCT Blood Glucose.: 122 mg/dL (2022 08:41)      PHYSICAL EXAM:       ICU Vital Signs Last 24 Hrs  T(C): 36.3 (2022 06:19), Max: 37 (2022 21:33)  T(F): 97.3 (2022 06:19), Max: 98.6 (2022 21:33)  HR: 86 (2022 06:19) (71 - 90)  BP: 149/71 (2022 06:19) (133/72 - 151/74)  BP(mean): --  ABP: --  ABP(mean): --  RR: 18 (2022 06:19) (18 - 18)  SpO2: 97% (2022 06:19) (94% - 97%) on 2lpm nasal canula     General: Awake. Alert. Cooperative. No distress. Appears stated age. Obese. Laying flat in bed  HEENT: Atraumatic. Normocephalic. Anicteric. Normal oral mucosa. PERRL. EOMI.  Neck: Supple. Trachea midline. Thyroid without enlargement/tenderness/nodules. No carotid bruit. No JVD.	  Cardiovascular: Regular rate and rhythm. Distant S1 S2. No murmurs, rubs or gallops.  Respiratory: Respirations unlabored. Bilateral rhonchi and wheeze. No curvature.  Abdomen: Soft. Non-tender. Non-distended. No organomegaly. No masses. Normal bowel sounds.  Extremities: Warm to touch. No clubbing or cyanosis. No pedal edema. Right BKA bandaged. Decreased swelling and pitting edema of the left upper extremity  Pulses: Decreased peripheral pulses LLE  Skin: Venous stasis changes left lower extremity  Lymph Nodes: Cervical, supraclavicular and axillary nodes normal  Neurological: Left sided weakness left > arm. Left facial droop. A and O x 2  Psychiatry: Appropriate mood and affect.    LABS:                          6.9    11.82 )-----------( 152      ( 2022 07:23 )             23.5     CBC    WBC  11.82 <==, 15.28 <==, 15.37 <==, 18.18 <==, 21.20 <==, 22.61 <==, 21.90 <==    Hemoglobin  6.9 <<==, 7.3 <<==, 7.1 <<==, 7.8 <<==, 8.0 <<==, 8.1 <<==, 7.8 <<==    Hematocrit  23.5 <==, 24.7 <==, 24.1 <==, 26.7 <==, 26.4 <==, 27.2 <==, 26.6 <==    Platelets  152 <==, 180 <==, 206 <==, 256 <==, 250 <==, 277 <==, 279 <==      144  |  105  |  45<H>  ----------------------------<  106<H>    04-26  3.9   |  26  |  1.10      LYTES    sodium  144 <==, 145 <==, 143 <==, 143 <==, 141 <==, 146 <==, 143 <==    potassium   3.9 <==, 4.0 <==, 4.2 <==, 4.4 <==, 4.1 <==, 4.3 <==, 4.2 <==    chloride  105 <==, 106 <==, 105 <==, 106 <==, 105 <==, 109 <==, 107 <==    carbon dioxide  26 <==, 26 <==, 26 <==, 24 <==, 23 <==, 22 <==, 22 <==    =============================================================================================  RENAL FUNCTION:    Creatinine:   1.10  <<==, 1.24  <<==, 1.29  <<==, 1.27  <<==, 1.33  <<==, 1.47  <<==, 1.72  <<==    BUN:   45 <==, 53 <==, 51 <==, 53 <==, 64 <==, 70 <==, 79 <==    ============================================================================================    calcium   8.5 <==, 8.4 <==, 8.6 <==, 8.8 <==, 8.7 <==, 8.6 <==, 8.4 <==    phos   3.0 <==, 3.1 <==, 3.7 <==, 4.2 <==, 3.8 <==, 4.2 <==, 4.7 <==    mag   2.0 <==, 2.0 <==, 2.1 <==, 2.2 <==, 2.2 <==, 2.3 <==, 2.4 <==    ============================================================================================  PT/INR - ( 2022 07:23 )   PT: 13.7 sec;   INR: 1.18 ratio       PTT - ( 2022 07:23 )  PTT:76.2 sec    ABG - ( 2022 18:43 )  pH: 7.43  /  pCO2: 35    /  pO2: 80    / HCO3: 23    / Base Excess: -0.8  /  SaO2: 99.0      Venous Blood Gas:   @ 22:10  7.40/41/52/25/84.4  VBG Lactate: 1.8    Venous Blood Gas:   @ 22:23  7.40/39/45/24/78.1  VBG Lactate: 2.4    Venous Blood Gas:   @ 22:23  7.40/39/45/24/78.1  VBG Lactate: 2.4    Venous Blood Gas:  04-10 @ 23:19  7.39/42/44/25/72.2  VBG Lactate: 2.5    < from: TTE with Doppler (w/Cont) (22 @ 15:07) >    Patient name: Martir Heart  YOB: 1935   Age: 86 (M)   MR#: [id number]  Study Date: 4/3/2022  Location: [address] [place], [place] [id number]: Angie Olivarez RDCS  Study quality: Technically difficult  Referring Physician: Anmol Quinn MD  BloodPressure: 115/70 mmHg  Height: 173 cm  Weight: 92 kg  BSA: 2.1 m2  ------------------------------------------------------------------------  PROCEDURE: Transthoracic echocardiogram with 2-D, M-Mode  and complete spectral and color flow Doppler. Verbal  consent was obtained for injection of  Ultrasonic Enhancing  Agent following a discussion of risks and benefits.  Following intravenous injection of Ultrasonic Enhancing  Agent, harmonic imaging was performed.  INDICATION: Cardiomyopathy, unspecified (I42.9)  ------------------------------------------------------------------------  Dimensions:    Normal Values:  LA:     3.1    2.0 - 4.0 cm  Ao:     3.5    2.0 - 3.8 cm  SEPTUM: 1.1    0.6 - 1.2 cm  PWT:    1.3    0.6 - 1.1 cm  LVIDd:  4.3    3.0- 5.6 cm  LVIDs:  3.2    1.8 - 4.0 cm  Derived variables:  LVMI: 90 g/m2  RWT: 0.60  Fractional short: 26 %  EF (Visual Estimate): NWV %  Doppler Peak Velocity (m/sec): MV=1.6 AoV=1.4  ------------------------------------------------------------------------  Conclusions:  1. Aortic valve not well visualized; appears calcified.  Peak transaortic valve gradient equals 8 mm Hg, mean  transaortic valve gradient equals 3 mm Hg, estimated aortic  valve area equals 2 sqcm (by continuity equation), aortic  valve velocity time integral equals 22 cm, consistent with  mild aortic stenosis.  2. Endocardial visualization enhanced with intravenous  injection of Ultrasonic Enhancing Agent (Definity). Mild  left ventricular systolic dysfunction. The inferior wall,  and the inferoseptum are hypokinetic.  3. The right ventricle is not well visualized; grossly  normal right ventricular systolic function.  ------------------------------------------------------------------------  Confirmed on  4/3/2022 - 17:12:14 by BRITTANY Giraldo  ------------------------------------------------------------------------  -----------------------------------------------------------  MICROBIOLOGY:     COVID-19 PCR . (22 @ 18:23)   COVID-19 PCR: NotDetec:     Urinalysis Basic - ( 2022 02:47 )    Color: Yellow / Appearance: Clear / S.021 / pH: x  Gluc: x / Ketone: Negative  / Bili: Negative / Urobili: Negative   Blood: x / Protein: 30 mg/dL / Nitrite: Negative   Leuk Esterase: Negative / RBC: 12 /hpf / WBC 2 /HPF   Sq Epi: x / Non Sq Epi: 0 /hpf / Bacteria: Negative    Culture - Blood (22 @ 11:16)   Specimen Source: .Blood Blood-Peripheral   Culture Results:   No growth to date.     Culture - Blood (22 @ 11:16)   Specimen Source: .Blood Blood-Peripheral   Culture Results:   No growth to date.     RADIOLOGY:  [x] Chest radiographs reviewed and interpreted by me    EXAM:  DUPLEX EXT VEINS UPPER LT                          PROCEDURE DATE:  2022      IMPRESSION:  No evidence of left upper extremity deep venous thrombosis.    NARCISO MUÑOZ MD; Attending Radiologist  This document has been electronically signed. 2022  8:48PM  ---------------------------------------------------------------------------------------------------------------  EXAM:  XR CHEST PORTABLE URGENT 1V                          PROCEDURE DATE:  2022      FINDINGS:  Left chest wall pacemaker.  The heart size cannot be accurately evaluated on this projection.  Bilateral lower lung hazy opacities, reduced since 2022.  There is no pneumothorax.    IMPRESSION:  Partial clearing of the bases and left effusion.    ANITA INFANTE MD; Resident Radiologist  This document has been electronically signed.  HORACIO HELMS MD; Attending Interventional Radiologist  Thisdocument has been electronically signed. 2022  3:57PM  --------------------------------------------------------------------------------------------------------------  EXAM:  CT BRAIN                          PROCEDURE DATE:  2022      FINDINGS:    Prominence of ventricles and subarachnoid spaces, compatible with   atrophy. Periventricular small vessel white matter ischemic changes.   Encephalomalacia and gliosis is appreciated bilaterally compatible with   old regions of infarction, noted in a high left posterior frontal   location, right posterior parietal/occipital location, and right   cerebellar location. Old ischemic event is also appreciated along the   anterior limb of the internal capsule on the left and along the external   capsular region on the right.    There is prominence of the bifrontal extra-axial regions, suggestive of   bifrontal subdural hygromas, more prominent on the left than the   right-stable compared with prior. Prominent deposition of calcified   plaque within the bilateral carotid siphons, as on prior.    Deposition of calcium appreciated within the bilateral basal ganglia, as   on prior.    No acute intracranial hemorrhage. No intraparenchymal mass lesion, mass   effect, or midline shift.    Appearance of the bilateral optic lenses suggests the patient has   previously undergone prior cataract surgery.    Imaged paranasal sinuses, bilateral mastoid air cells, middle ear   cavities are clear.    IMPRESSION:  1. No acute intracranial hemorrhage.    2. 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 No evidence of an acute ischemic event.    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    DAWN BEHR-VENTURA MD; Attending Radiologist  This document has been electronically signed. 2022  8:25PM  ---------------------------------------------------------------------------------------------------------------   EXAM:  XR CHEST PORTABLE ROUTINE 1V                          PROCEDURE DATE:  2022      FINDINGS:    Support devices: A pacemaker overlies the left chest wall with its leads   intact.    Cardiac/mediastinum/hilum: Heart size cannot be accurately assessed on   this projection.    Lung parenchyma/Pleura: Right lower lung hazy opacities. Bibasilar   atelectasis. Trace left pleural effusion, unchanged. There is no   pneumothorax.    Skeleton/soft tissues: No acute osseous abnormalities.    IMPRESSION:    Right lower lung hazy opacities, which may represent atelectasis versus   infection.    Unchanged trace left pleural effusion.    EDITH HER MD; Resident Radiologist  This document has been electronically signed.  EVON MAN MD; Attending Radiologist  This document has been electronically signed. 2022  5:11PM  ---------------------------------------------------------------------------------------------------------------  EXAM:  XR CHEST PORTABLE ROUTINE 1V                          PROCEDURE DATE:  04/10/2022      FINDINGS:  Left pacemaker with leads in the right atrium and ventricle.    The heart size is not accurately assessed on this projection.  Bilateral lower lung field patchy opacities, left greater than right.  There is no pneumothorax. Trace left pleural effusion.    IMPRESSION:  Bilateral patchy opacities, left greater than right, differential   includes atelectasis or infection.    JAYDON MONTEMAYOR MD; Resident Radiologist  This document has been electronically signed.  SATURNINO CHERRY MD; Attending Radiologist  This document has been electronically signed. Apr 10 2022  9:14AM  --------------------------------------------------------------------------------------------  EXAM:  XR CHEST PORTABLE URGENT 1V                          PROCEDURE DATE:  2022      FINDINGS:  Left chest wall dual-lead pacemaker. Rotated exam limits assessment for   lead tip.    Small left pleural effusion with adjacent opacity. No pneumothorax.    Cardiac size cannot accurately be assessed in this projection.  Aortic   calcifications.      IMPRESSION: Small left pleural effusion with adjacent atelectasis   obscuring evaluation of the underlying lung.    DRE SANCHEZ MD; Resident Radiology  This document has been electronically signed.  WAGNER LAM MD; Attending Radiologist  This document has been electronically signed. 2022  5:54PM  ---------------------------------------------------------------------------------------------------------------  EXAM:  XR CHEST PORTABLE URGENT 1V                          PROCEDURE DATE:  2022      FINDINGS:  Left chest wall dual-lead pacemaker.  The heart is normal in size.  The lungs are clear.  There is no pneumothorax or pleural effusion.    IMPRESSION:  Clear lungs.    KENA CARRINGTON MD; Resident Radiologist  This document has been electronically signed.  SATURNINO CHERRY MD; Attending Radiologist  This document has been electronically signed. 2022 11:40AM  ---------------------------------------------------------------------------------------------------------------  EXAM:  XR CHEST PORTABLE URGENT 1V                          PROCEDURE DATE:  2022      FINDINGS:    Appropriate course of dual-chamber pacemaker. Unremarkable   cardiomediastinal silhouette. Minimal left basilar atelectasis. No   pleural effusion or pneumothorax.      IMPRESSION:    Mild left basilar atelectasis.    JOSEPH GAMINO M.D., ATTENDING RADIOGIST  This document has been electronically signed. Apr  3 2022  9:00AM  ---------------------------------------------------------------------------------------------------------------

## 2022-04-27 NOTE — CHART NOTE - NSCHARTNOTEFT_GEN_A_CORE
RRT called around 17:30 for AMS; pt found to be hypoglycemic to 53 mg/dl. Per RN, pt had received premeal insulin however did not eat much lunch. Vitals wnl on 2l NC. Pt arousable w/sternal rub. Pt sounds congested by breathing on 2L NC. RLE stump w/some dark fringes around erythematous base, no drainage. Labs notable for troponin of 204, .4. EKG w/o ischemic changes. Gave amp of D50 with improvement of glucose to 217 mg/dl. RRT ended.     Around 1.5hrs later, a further RRT was called for AMS. Pt arousable again to  sternal rub. POC glucose in 100's. ABG drawn, unremarkable. CXR ordered, no signs of pulm edema or pleural effusion. No FND. Pt responsive however AOx1, (baseline AOx2). CKMB and Myoglobin sent. RRT ended, per discussion w/fellow and attending, will obtain CT head non con, Abx, aspiration precautions, chest pt, and ICU consult.     ORESTES Barry, PGY-1  Vascular Surgery  [id number]

## 2022-04-28 NOTE — PROGRESS NOTE ADULT - SUBJECTIVE AND OBJECTIVE BOX
[facility] - PROGRESS NOTE    CHIEF COMPLAINT: acute hypoxic respiratory failure; COPD exacerbation; mucous plugging; atelectasis; weak cough; pleural effusion; dysphagia; right foot gangrene s/p BKA    INTERVAL HISTORY: confusion yesterday initially due to hypoglycemia off steroids -> improved after glucose -> insulin adjusted; a second episode of altered mental followed with normal glucose -> started on antibiotics due to concern of a stump infection and transferred to the ICU for observation; now more awake and alert but not back to baseline mental status sitting in the chair; s/p completion right BKA  -> significant right leg pain continues now off narcotics and gabapentin due to confusion; no shortness of breath or hypoxemia on a 2lpm nasal canula; occasional weak cough productive of scant sputum; persistent chest congestion and wheeze exacerbated when eating; no fevers, chills or sweats; no chest pain/pressure or palpitations; FEEST revealed severe dysphagia -> non oral nutrition recommended -> the family has elected to continue oral feeding understanding the risks of aspiration (small bites and easy to chew diet with moderately thickened fluids ordered); left facial droop and left sided weakness are at baseline); received 1 unit PRBCs  for post-operative anemia; hudson remains in place due to urinary retention      REVIEW OF SYSTEMS:  Constitutional: As per interval history  HEENT: Within normal limits  CV: As per interval history  Resp: As per interval history  GI: dysphagia  : Within normal limits  Musculoskeletal: Within normal limits  Skin: Within normal limits  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 3.375 Gram(s) IV Intermittent every 8 hours  vancomycin  IVPB 1250 milliGRAM(s) IV Intermittent every 12 hours    Cardiovascular:  furosemide    Tablet 20 milliGRAM(s) Oral two times a day  metoprolol succinate ER 25 milliGRAM(s) Oral daily  tamsulosin 0.8 milliGRAM(s) Oral at bedtime    Other:  acetaminophen     Tablet .. 975 milliGRAM(s) Oral every 6 hours  atorvastatin 40 milliGRAM(s) Oral at bedtime  finasteride 5 milliGRAM(s) Oral daily  heparin  Infusion 1550 Unit(s)/Hr IV Continuous <Continuous>  insulin lispro (ADMELOG) corrective regimen sliding scale   SubCutaneous at bedtime  insulin lispro (ADMELOG) corrective regimen sliding scale   SubCutaneous three times a day before meals  levothyroxine 25 MICROGram(s) Oral daily  multivitamin/minerals 1 Tablet(s) Oral daily  pantoprazole    Tablet 40 milliGRAM(s) Oral before breakfast  polyethylene glycol 3350 17 Gram(s) Oral daily  senna 2 Tablet(s) Oral at bedtime    OBJECTIVE:    POCT Blood Glucose.: 166 mg/dL (2022 12:22)  POCT Blood Glucose.: 193 mg/dL (2022 08:28)  POCT Blood Glucose.: 133 mg/dL (2022 23:26)  POCT Blood Glucose.: 123 mg/dL (2022 21:17)  POCT Blood Glucose.: 108 mg/dL (2022 19:30)  POCT Blood Glucose.: 144 mg/dL (2022 18:39)  POCT Blood Glucose.: 217 mg/dL (2022 17:58)  POCT Blood Glucose.: 53 mg/dL (2022 17:43)  POCT Blood Glucose.: 54 mg/dL (2022 17:42)      PHYSICAL EXAM:       ICU Vital Signs Last 24 Hrs  T(C): 36.3 (2022 12:00), Max: 36.6 (2022 16:29)  T(F): 97.4 (2022 12:00), Max: 97.9 (2022 16:29)  HR: 85 (2022 14:00) (60 - 86)  BP: 122/59 (2022 14:00) (122/59 - 174/73)  BP(mean): 85 (2022 14:00) (84 - 105)  ABP: --  ABP(mean): --  RR: 20 (2022 14:00) (12 - 39)  SpO2: 93% (2022 14:00) (86% - 100%) on 2lpm nasal canula     General: Awake. Alert. Confused. Cooperative. No distress. Appears stated age. Obese. Sitting in the chair  HEENT: Atraumatic. Normocephalic. Anicteric. Normal oral mucosa. PERRL. EOMI.  Neck: Supple. Trachea midline. Thyroid without enlargement/tenderness/nodules. No carotid bruit. No JVD.	  Cardiovascular: Regular rate and rhythm. Distant S1 S2. No murmurs, rubs or gallops.  Respiratory: Respirations unlabored. Bilateral rhonchi and wheeze. No curvature.  Abdomen: Soft. Non-tender. Non-distended. No organomegaly. No masses. Normal bowel sounds.  Extremities: R BKA stump with operative dressing in place that is c/d/i - no strikethrough noted - stump appears swollen, erythematous and discolored along the stump line - staples are intact with out any purulent or serosanguinous discharge - knee immobilizer in place. Decreased swelling and pitting edema of the left upper extremity  Pulses: Decreased peripheral pulses LLE  Skin: Venous stasis changes left lower extremity  Lymph Nodes: Cervical, supraclavicular and axillary nodes normal  Neurological: Left sided weakness left > arm. Left facial droop. A and O x 1  Psychiatry: Appropriate mood and affect.    LABS:                          8.5    9.29  )-----------( 118      ( 2022 03:35 )             28.7     CBC    WBC  9.29 <==, 8.42 <==, 10.71 <==, 11.82 <==, 15.28 <==, 15.37 <==, 18.18 <==    Hemoglobin  8.5 <<==, 7.8 <<==, 8.2 <<==, 6.9 <<==, 7.3 <<==, 7.1 <<==, 7.8 <<==    Hematocrit  28.7 <==, 26.4 <==, 26.3 <==, 23.5 <==, 24.7 <==, 24.1 <==, 26.7 <==    Platelets  118 <==, 104 <==, 127 <==, 152 <==, 180 <==, 206 <==, 256 <==      145  |  106  |  34<H>  ----------------------------<  135<H>    04-28  3.7   |  26  |  0.93      LYTES    sodium  145 <==, 144 <==, 142 <==, 144 <==, 145 <==, 143 <==, 143 <==    potassium   3.7 <==, 3.7 <==, 3.9 <==, 3.9 <==, 4.0 <==, 4.2 <==, 4.4 <==    chloride  106 <==, 106 <==, 105 <==, 105 <==, 106 <==, 105 <==, 106 <==    carbon dioxide  26 <==, 26 <==, 26 <==, 26 <==, 26 <==, 26 <==, 24 <==    =============================================================================================  RENAL FUNCTION:    Creatinine:   0.93  <<==, 0.98  <<==, 0.99  <<==, 1.10  <<==, 1.24  <<==, 1.29  <<==, 1.27  <<==    BUN:   34 <==, 39 <==, 39 <==, 45 <==, 53 <==, 51 <==, 53 <==    ============================================================================================    calcium   8.9 <==, 8.4 <==, 8.5 <==, 8.5 <==, 8.4 <==, 8.6 <==, 8.8 <==    phos   3.7 <==, 3.6 <==, 2.8 <==, 3.0 <==, 3.1 <==, 3.7 <==, 4.2 <==    mag   2.0 <==, 2.0 <==, 2.0 <==, 2.0 <==, 2.0 <==, 2.1 <==, 2.2 <==    ============================================================================================  LFTs    AST:   9 <==     ALT:  14  <==     AP:  55  <=    Bili:  0.5  <=    PT/INR - ( 2022 03:35 )   PT: 16.3 sec;   INR: 1.40 ratio       PTT - ( 2022 09:57 )  PTT:82.0 sec    Venous Blood Gas:   @ 03:32  7.39/52/38/32/61.8  VBG Lactate: 1.0    ABG - ( 2022 19:45 )  pH: 7.48  /  pCO2: 41    /  pO2: 106   / HCO3: 30    / Base Excess: 6.4   /  SaO2: 99.3      Venous Blood Gas:   @ 22:10  7.40/41/52/25/84.4  VBG Lactate: 1.8    Venous Blood Gas:   @ 22:23  7.40/39/45/24/78.1  VBG Lactate: 2.4    Venous Blood Gas:   @ 22:23  7.40/39/45/24/78.1  VBG Lactate: 2.4    Venous Blood Gas:  04-10 @ 23:19  7.39/42/44/25/72.2  VBG Lactate: 2.5    Procalcitonin, Serum: 0.33 ng/mL ( @ 03:35)    CARDIAC MARKERS ( 2022 20:08 )  CPK x     /CKMB x     /CKMB Units 2.9 ng/mL    troponin x        CARDIAC MARKERS ( 2022 18:27 )  CPK x     /CKMB x     /CKMB Units x        troponin 204 ng/L    < from: TTE with Doppler (w/Cont) (22 @ 15:07) >    Patient name: Martir Heart  YOB: 1935   Age: 86 (M)   MR#: [id number]  Study Date: 4/3/2022  Location: [address] [place], [place] [id number]: Angie Olivarez MAGO  Study quality: Technically difficult  Referring Physician: Anmol Quinn MD  BloodPressure: 115/70 mmHg  Height: 173 cm  Weight: 92 kg  BSA: 2.1 m2  ------------------------------------------------------------------------  PROCEDURE: Transthoracic echocardiogram with 2-D, M-Mode  and complete spectral and color flow Doppler. Verbal  consent was obtained for injection of  Ultrasonic Enhancing  Agent following a discussion of risks and benefits.  Following intravenous injection of Ultrasonic Enhancing  Agent, harmonic imaging was performed.  INDICATION: Cardiomyopathy, unspecified (I42.9)  ------------------------------------------------------------------------  Dimensions:    Normal Values:  LA:     3.1    2.0 - 4.0 cm  Ao:     3.5    2.0 - 3.8 cm  SEPTUM: 1.1    0.6 - 1.2 cm  PWT:    1.3    0.6 - 1.1 cm  LVIDd:  4.3    3.0- 5.6 cm  LVIDs:  3.2    1.8 - 4.0 cm  Derived variables:  LVMI: 90 g/m2  RWT: 0.60  Fractional short: 26 %  EF (Visual Estimate): NWV %  Doppler Peak Velocity (m/sec): MV=1.6 AoV=1.4  ------------------------------------------------------------------------  Conclusions:  1. Aortic valve not well visualized; appears calcified.  Peak transaortic valve gradient equals 8 mm Hg, mean  transaortic valve gradient equals 3 mm Hg, estimated aortic  valve area equals 2 sqcm (by continuity equation), aortic  valve velocity time integral equals 22 cm, consistent with  mild aortic stenosis.  2. Endocardial visualization enhanced with intravenous  injection of Ultrasonic Enhancing Agent (Definity). Mild  left ventricular systolic dysfunction. The inferior wall,  and the inferoseptum are hypokinetic.  3. The right ventricle is not well visualized; grossly  normal right ventricular systolic function.  ------------------------------------------------------------------------  Confirmed on  4/3/2022 - 17:12:14 by BRITTANY Giraldo  ------------------------------------------------------------------------  -----------------------------------------------------------  MICROBIOLOGY:     COVID-19 PCR . (22 @ 18:23)   COVID-19 PCR: NotDetec:     Urinalysis Basic - ( 2022 02:47 )    Color: Yellow / Appearance: Clear / S.021 / pH: x  Gluc: x / Ketone: Negative  / Bili: Negative / Urobili: Negative   Blood: x / Protein: 30 mg/dL / Nitrite: Negative   Leuk Esterase: Negative / RBC: 12 /hpf / WBC 2 /HPF   Sq Epi: x / Non Sq Epi: 0 /hpf / Bacteria: Negative    RADIOLOGY:  [x] Chest radiographs reviewed and interpreted by me    EXAM:  XR CHEST PORTABLE URGENT 1V                          PROCEDURE DATE:  2022      FINDINGS:    The cardiomediastinal silhouette is not well evaluated in this   projection. Thoracic aortic calcifications. 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  New patchy right basilar opacities.  Worsening patchy left perihilar opacities, most pronounced in the mid to   lower lung.  No right pleural effusion.  There is continued left basilar and retrocardiac opacity.  No pneumothorax.  No acute bony abnormality.    IMPRESSION:  New patchy right basilar opacities which could be due to subsegmental   atelectasis or pneumonia.    Worsening patchy left perihilar opacities, most pronounced in the mid to   lower lung, possibly asymmetric pulmonary edema, atelectasis, and/or   pneumonia.    Continued left basilar and retrocardiac opacity which may be due to a   left pleural effusion with passive atelectasis, atelectasis of other   cause, and/or pneumonia.    DUSTY ZUÑIGA DO;Resident Radiologist  This document has been electronically signed.  IGLESIA DEL ROSARIO MD; Attending Radiologist  This document has been electronically signed. 2022  1:32PM  ---------------------------------------------------------------------------------------------------------------  EXAM:  CT BRAIN                          PROCEDURE DATE:  2022      FINDINGS:    Redemonstration of right cerebellar hemisphere, right greater than left   occipital, and high left posterior frontal chronic infarcts    Similar low density left lateral convexity subdural collection measuring   1 cm in greatest depth.    No midline shift or effacement of basal cisterns. No hydrocephalus.    No acute intracranial hemorrhage or brain edema. Moderate white matter   microvascular ischemic disease.    IMPRESSION:    No hydrocephalus, acute intracranial hemorrhage, mass effect, or brain   edema.    Similar low density left lateral convexity subdural collection measuring   1 cm in greatest depth.    No midline shift or effacement of basal cisterns. No hydrocephalus.    Chronic right cerebellar hemisphere, right greater than left occipital,   and high left posterior frontal infarcts.    KENA CASAS MD; Attending Radiologist  This document has been electronically signed. 2022  9:00AM  ---------------------------------------------------------------------------------------------------------------  EXAM:  DUPLEX EXT VEINS UPPER LT                          PROCEDURE DATE:  2022      IMPRESSION:  No evidence of left upper extremity deep venous thrombosis.    NARCISO MUÑOZ MD; Attending Radiologist  This document has been electronically signed. 2022  8:48PM  ---------------------------------------------------------------------------------------------------------------  EXAM:  XR CHEST PORTABLE URGENT 1V                          PROCEDURE DATE:  2022      FINDINGS:  Left chest wall pacemaker.  The heart size cannot be accurately evaluated on this projection.  Bilateral lower lung hazy opacities, reduced since 2022.  There is no pneumothorax.    IMPRESSION:  Partial clearing of the bases and left effusion.    ANITA INFANTE MD; Resident Radiologist  This document has been electronically signed.  HORACIO HELMS MD; Attending Interventional Radiologist  Thisdocument has been electronically signed. 2022  3:57PM  --------------------------------------------------------------------------------------------------------------

## 2022-04-28 NOTE — PROGRESS NOTE ADULT - ASSESSMENT
86 year old gentleman with a PMH DM, HTN, HLD who has been followed for the past 6 months for foot wounds and leg pain.      EKG -  A sense V paced PVC's  Echo - Mild LV dysfunction mild aortic stenosis    1) Post-op assessment   -pt has h/o moderate LV dysfunction as per echo 2017, no chest pains 2d echo now shows mild LV dysfunction  -12 lead EKG ok,  PPM interrogated  -s/p BKA     2) HTN  -controlled  -c/w metoprolol  -continue to monitor BP    3) Chronic systolic CHF   - hold metolazone   -c/w PO lasix 20mg BID  -monitor I&Os    4) ?Atrial fib   -c/w hep to coumadin bridge   -monitor INR     5) KARLOS  -improving  -continue to hold losartan and metolazone  -f/u renal

## 2022-04-28 NOTE — PROGRESS NOTE ADULT - SUBJECTIVE AND OBJECTIVE BOX
Vascular Surgery     SUBJECTIVE: Pt seen and examined at bedside. He was laying in bed comfortably. He is more alert today. He states he has pain in his right leg. He denies any CP, SOB, numbness/tingling in b/l limbs, loss of sensation or motor function, n/v/d      Vital Signs Last 24 Hrs  T(C): 36.1 (2022 03:00), Max: 37.1 (2022 10:15)  T(F): 97 (2022 03:00), Max: 98.8 (2022 10:15)  HR: 71 (2022 07:00) (60 - 86)  BP: 129/60 (2022 07:00) (122/62 - 174/73)  BP(mean): 86 (2022 07:00) (86 - 105)  RR: 22 (2022 07:00) (15 - 39)  SpO2: 93% (2022 07:00) (86% - 100%)  I&O's Summary    2022 07:01  -  2022 07:00  --------------------------------------------------------  IN: 894 mL / OUT: 1315 mL / NET: -421 mL      I&O's Detail    2022 07:01  -  2022 07:00  --------------------------------------------------------  IN:    Heparin: 124 mL    IV PiggyBack: 650 mL    Oral Fluid: 120 mL  Total IN: 894 mL    OUT:    Indwelling Catheter - Urethral (mL): 1315 mL  Total OUT: 1315 mL    Total NET: -421 mL          Labs:                         8.5    9.29  )-----------( 118      ( 2022 03:35 )             28.7     04-28    145  |  106  |  34<H>  ----------------------------<  135<H>  3.7   |  26  |  0.93    Ca    8.9      2022 03:35  Phos  3.7     -  Mg     2.0         TPro  5.3<L>  /  Alb  2.2<L>  /  TBili  0.5  /  DBili  x   /  AST  9<L>  /  ALT  14  /  AlkPhos  55  04-27    PT/INR - ( 2022 03:35 )   PT: 16.3 sec;   INR: 1.40 ratio         PTT - ( 2022 03:35 )  PTT:66.4 sec  Urinalysis Basic - ( 2022 02:47 )    Color: Yellow / Appearance: Clear / S.021 / pH: x  Gluc: x / Ketone: Negative  / Bili: Negative / Urobili: Negative   Blood: x / Protein: 30 mg/dL / Nitrite: Negative   Leuk Esterase: Negative / RBC: 12 /hpf / WBC 2 /HPF   Sq Epi: x / Non Sq Epi: 0 /hpf / Bacteria: Negative      Physical Exam:  Gen: NAD   CV: Regular rate  Resp: Nonlabored breathing with face mask in place  Ext: R BKA stump with operative dressing in place that is c/d/i, no strikethrough noted, stump appears swollen, erythematous and with increase skin discoloration along the stump line, staples are intact with out any purulent or serosanguinous discharge, knee immobilizer in place

## 2022-04-28 NOTE — PROVIDER CONTACT NOTE (MEDICATION) - REASON
Infiltrated IV for heparin w/ subtherapeutic aPTT x2.
Insulin gtt disconnected from pt w/ increasing hyperglycemia
22:00 medications not given on 9Monti

## 2022-04-28 NOTE — PROGRESS NOTE ADULT - ASSESSMENT
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 Post-op course c/b severe COPD exacerbation requiring respiratory support and continuous monitoring in SICU. After improvement, pt sent to the floor where the patient's pulmonary status was optimized by pulmonology and the RLE BKA was formalized on 4/22. On 4/27, rapid response called for lethargy 2/2 hypoglycemia which improved upon glucose administration. Shortly afterwards, pt with additional episode of lethargy despite normal blood glucose and stable vital signs/labs. Pt taken for CTH, then transferred to SICU for close monitoring.     PLAN:  Neuro: post-operative pain, lethargy 2/2 sepsis 2/2 RLE stump infection?  - Monitor mental status  - Pain control as needed with acetaminophen and naproxen  - Avoiding opioids and gabapentin iso lethargy  -  Q4hrs neuro checks iso lethargy    Resp: hx COPD  - Monitor pulse oximeter  - Continue Duoneb, Pulmicort, montelukast for COPD  - Diuresis with PO furosemide 20mg BID  - Chest PT, robitussin for secretions  - Out of bed to chair, ambulate as tolerated, and incentive spirometry to prevent atelectasis    CV: hx HLD  - Continue metoprolol ER 50mg daily  - Monitor vital signs    GI: no acute issues  - Soft and Bite sized (consistent carb) diet as tolerated  - Bowel regimen with senna & Miralax  - Continue home protonix    Renal: urinary retention  - Monitor I&Os  - Monitor electrolytes and replete as necessary  - Adair replaced 4/27 for retention; continue tamsulosin and finasteride    Heme:  - Monitor CBC and coags  - Heparin infusion for a-fib  - Was being transitioned to warfarin, although held due to concern for infected RLE stump with potential for RTOR    ID: possible RLE stump infection  - Monitor for clinical evidence of active infection  - Empiric vancomycin and zosyn  - F/u MRSA swab  - BCx sent 4/28  - UA negative 4/27  - F/u procal    Endo: hx DM, hx HLD, hx hypothyroidism  - Home statin  - Monitor glucose qACHS with ISS    Disposition: Transfer to SICU    Code Status: Full code

## 2022-04-28 NOTE — PROVIDER CONTACT NOTE (OTHER) - BACKGROUND
Pt s/p BKA
admitted with ROSANGELA JAMES, hx of COPD
Informed Ho Barry MD of 3+ pitting edema to the left arm and index finger pain.
Pt is AOx3, confused at times. Pt is s/p AKA. Pt has been unable to void since last hudson catheter was removed on 4/25.
S/p Right BKA

## 2022-04-28 NOTE — PROGRESS NOTE ADULT - ASSESSMENT
ASSESSMENT:    86 year old gentleman, former smoker, followed by Dr. Alicja Rob of our practice for asthma/COPD overlap  syndrome and obstructive sleep apnea being treated conservatively. He has no history of TOM colonization/infection as listed in the "past medical history". He has been stable from a pulmonary perspective and maintained on budesonide and duoneb once daily and if needed. He also has a history of HTN, HLD, DM, CKD, CVA, CHF (mild systolic dysfunction) and atrial fibrillation with sick sinus syndrome s/p pacemaker implantation. He has been followed for many months for chronic foot wounds and leg pain now with some purulence and gangrene. The pain is exacerbated when laying in bed and improves with tylenol and when hanging the legs off of the bed. He is no longer able to ambulate. 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 The patient has developed marked shortness of breath with severe hypoxemia currently requiring a nasal canula @ 5lpm to maintain saturation @ 90%. He has a cough with copious mucous in his chest which he is unable to expectorate. He has chest congestion and wheeze. No fevers, chills or sweats. No chest pain/pressure or palpitations. Called by the patient's family and asked to be involved with his pulmonary care.    acute hypoxic respiratory failure    1) severe COPD exacerbation -> slowly improving but exacerbated by ongoing aspiration  2) 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 and respiratory muscle weakness limiting diaphragmatic excursion and chest wall expansion -> bibasilar atelectasis has improved on repeat CXR  3) no evidence of pulmonary edema or pneumonia  4) 4/28 -> possible aspiration pneumonia    leukocytosis     steroid induced hyperglycemia    CKD/KARLOS due to diuresis in the setting of euvolemia -> improved    4/14 - remains in the ICU; continues on an insulin infusion for steroid induced hyperglycemia; worsening of his mental status and chronic left sided weakness and left facial droop after analgesics prior to the VAC change yesterday; CT scan and EEG are unremarkable; started on zosyn for possible "sepsis"; now awake and alert sitting in the chair and back to his baseline mental status; no shortness of breath or hypoxemia on room air; occasional cough productive of scant sputum; minimal chest congestion and wheeze; no fevers, chills or sweats; no chest pain/pressure or palpitations; CXR now has bibasilar atelectasis - there is no evidence of pulmonary edema; on heparin gtt for PAD    4/15 - transferred to the surgical floor; mental status is back to baseline; left facial droop and left sided weakness are no worse than usual; continues on zosyn for possible "sepsis"; awake and alert sitting in the chair; no shortness of breath or hypoxemia on room air; occasional cough productive of scant sputum; minimal chest congestion and wheeze; no fevers, chills or sweats; no chest pain/pressure or palpitations; CXR is with a small left pleural effusion and bibasilar atelectasis - there is no evidence of pulmonary edema; on heparin gtt for PAD    4/20 -  left arm swelling ->no evidence of DVT on Duplex; FEEST revealed severe dysphagia -> non oral nutrition recommended -> the family has elected to continue oral feeding understanding the risks of aspiration; mental status is back to baseline; left facial droop and left sided weakness are no worse than usual; continues on zosyn for possible "sepsis"; awake and alert sitting in the chair; no shortness of breath or hypoxemia on room air; occasional cough productive of scant sputum; mild chest congestion and wheeze especially after eating; no fevers, chills or sweats; no chest pain/pressure or palpitations; CXR reveals improved bibasilar atelectasis - there is no evidence of pulmonary edema; on heparin gtt for PAD    4/22 - NPO and off heparin gtt awaiting AKA; awake and alert sitting up in bed; no shortness of breath or hypoxemia on room air; occasional cough productive of scant sputum; chest congestion and wheeze iis much improved and exacerbated when eating; no fevers, chills or sweats; no chest pain/pressure or palpitations; decreased left arm swelling ->no evidence of DVT on Duplex    4/25 - 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; seems sedated on an increased dose of gabapentin and "as needed" ultram for ongoing leg pain; remains on a heparin gtt now being bridged to coumadin; no shortness of breath or hypoxemia on a 2lpm nasal canula; occasional weak cough productive of scant sputum; chest congestion and wheeze is much improved and exacerbated when eating;    4/28 -  confusion yesterday initially due to hypoglycemia off steroids -> improved after glucose -> insulin adjusted; a second episode of altered mental followed with normal glucose -> started on antibiotics due to concern of a stump infection and transferred to the ICU for observation; now more awake and alert but not back to baseline mental status sitting in the chair; 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 -> significant right leg pain continues now off narcotics and gabapentin due to confusion; no shortness of breath or hypoxemia on a 2lpm nasal canula; occasional weak cough productive of scant sputum; persistent chest congestion and wheeze exacerbated when eating; no fevers, chills or sweats; no chest pain/pressure or palpitations; FEEST revealed severe dysphagia -> non oral nutrition recommended -> the family has elected to continue oral feeding understanding the risks of aspiration (small bites and easy to chew diet with moderately thickened fluids ordered); left facial droop and left sided weakness are at baseline); received 1 unit PRBCs 4/26 for post-operative anemia; hudson remains in place due to urinary retention      PLAN/RECOMMENDATIONS:    stable oxygenation on a nasal canula @ 2lpm  has completed a steroid taper  albuterol/atrovent nebs q6h  pulmicort 0.5mg nebs q12h - give after duoneb - rinse mouth after use  mucinex 1200mg 2 times daily  singulair 10mg @ bedtime  acapella device/incentive spirometer  vancomycin/zosyn for possible aspiration pneumonia and/or right BKD stump infection - await blood and wound cultures  speech and swallow evaluation noted - severe dysphagia - NPO with non-oral feeding recommended - I explained the ongoing risk of aspiration with possible pneumonia, worsening bronchospasm, hypoxemia, respiratory failure etc to the patient and family - they do not want placement of a NGT or PEG and wish to continue an oral diet - written for a bite sized and easy to chew diet with moderately thickened fluids - small bites - no straws - chin tuck maneuver explained and demonstrated  cardiac meds: lipitor/toprol XL/lasix - still holding cozaar and metolazone  flomax/proscar -> hudson replaced due to urinary retention  EEG -> mild to moderate nonspecific diffuse or multifocal cerebral dysfunction -  no epileptiform patterns or seizures recorded.  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, stable in appearance compared with prior dated 9/8/2019  CXR 4/18 - improving bilateral lower lobe opacities (atelectasis)  vascular surgery follow-up    anticoagulation discontinued in case RTOR is necessar    pain control - tylenol - following mental status, liver function test, hemodynamics and H/H    s/p completion BKA    wound care  GI prophylaxis - protonix  bowel regimen  glucose control  out of bed and into the chair  LUE Duplex is without DVT    Will follow with you. Plan of care discussed with the patient and his family at bedside and with Dr. Spaulding. Discharge planning.    Sarabjit Wright MD, [facility]  [phone]  Pulmonary Medicine

## 2022-04-28 NOTE — PROVIDER CONTACT NOTE (MEDICATION) - RECOMMENDATIONS
Reassess aPTT at 0000 as per pt specific normogram order.
D/c insulin gtt at current rate, restart at lower rate w/ 1x IVP regular insulin.
DWAYNE Landers notified.

## 2022-04-28 NOTE — PROGRESS NOTE ADULT - SUBJECTIVE AND OBJECTIVE BOX
HISTORY  86y M with Hx DM, HTN, COPD, and HLD who presented with RLE pain (s/p angio 9/2021), found to have wet gangrene s/p R laura BKA 4/4. Post-op course c/b severe COPD exacerbation requiring respiratory support and continuous monitoring in SICU. After improvement, pt sent to the floor where the patient's pulmonary status was optimized by pulmonology and the RLE BKA was formalized on 4/22.    On 4/27, rapid response called for lethargy 2/2 hypoglycemia which improved upon glucose administration. Shortly afterwards, pt with additional episode of lethargy despite normal blood glucose and stable vital signs/labs. Pt taken for CTH, then transferred to SICU for close monitoring.       NEURO  Exam: AOx3, intermittently confused. Following commands. GCS 14 (M6 V4 E4)  Meds: acetaminophen     Tablet .. 975 milliGRAM(s) Oral every 6 hours  naproxen 250 milliGRAM(s) Oral every 12 hours  [x] Adequacy of sedation and pain control has been assessed and adjusted      RESPIRATORY  RR: 15 (04-28-22 @ 01:03) (15 - 20)  SpO2: 98% (04-28-22 @ 01:03) (86% - 98%)  Exam: unlabored respirations on 2L via nasal cannula  ABG - ( 27 Apr 2022 19:45 )  pH: 7.48  /  pCO2: 41    /  pO2: 106   / HCO3: 30    / Base Excess: 6.4   /  SaO2: 99.3    Meds: albuterol/ipratropium for Nebulization 3 milliLiter(s) Nebulizer every 6 hours  buDESOnide    Inhalation Suspension 0.5 milliGRAM(s) Inhalation every 12 hours  guaiFENesin ER 1200 milliGRAM(s) Oral every 12 hours  montelukast 10 milliGRAM(s) Oral daily      CARDIOVASCULAR  HR: 69 (04-28-22 @ 01:03) (60 - 86)  BP: 152/67 (04-28-22 @ 01:03) (122/62 - 152/67)  BP(mean): 97 (04-28-22 @ 01:03) (97 - 97)  Exam: RRR. RLE stump erythematous and tender. No drainage from wound, which is intact  Cardiac Rhythm: normal sinus  Perfusion     [x]Adequate   [ ]Inadequate  Mentation   [x]Baseline       [ ]Reduced  Extremities  [x]Warm         [ ]Cool  Volume Status [ ]Hypervolemic [x]Euvolemic [ ]Hypovolemic  Meds: furosemide    Tablet 20 milliGRAM(s) Oral two times a day  metoprolol succinate ER 25 milliGRAM(s) Oral daily  tamsulosin 0.8 milliGRAM(s) Oral at bedtime      GI/NUTRITION  Exam: soft, nontender, nondistended  Diet: Soft and bite-sized  Meds: pantoprazole    Tablet 40 milliGRAM(s) Oral before breakfast  polyethylene glycol 3350 17 Gram(s) Oral daily  senna 2 Tablet(s) Oral at bedtime      GENITOURINARY  I&O's Detail    04-26 @ 07:01 - 04-27 @ 07:00  --------------------------------------------------------  IN:    Heparin: 216 mL    Oral Fluid: 1020 mL    PRBCs (Packed Red Blood Cells): 300 mL  Total IN: 1536 mL    OUT:    Indwelling Catheter - Urethral (mL): 600 mL    Post-Void Residual per Intermittent Catheterization (mL): 1450 mL  Total OUT: 2050 mL    Total NET: -514 mL    04-27 @ 07:01 - 04-28 @ 01:51  --------------------------------------------------------  IN:    Heparin: 31 mL    IV PiggyBack: 100 mL    Oral Fluid: 120 mL  Total IN: 251 mL    OUT:    Indwelling Catheter - Urethral (mL): 1050 mL  Total OUT: 1050 mL    Total NET: -799 mL    04-27    144  |  106  |  39<H>  ----------------------------<  157<H>  3.7   |  26  |  0.98    Ca    8.4      27 Apr 2022 18:27  Phos  3.6     04-27  Mg     2.0     04-27    TPro  5.3<L>  /  Alb  2.2<L>  /  TBili  0.5  /  DBili  x   /  AST  9<L>  /  ALT  14  /  AlkPhos  55  04-27    [x] Adair catheter, indication: retention  Meds: multivitamin/minerals 1 Tablet(s) Oral daily      HEMATOLOGIC  Meds: heparin  Infusion 1550 Unit(s)/Hr IV Continuous <Continuous>  [x] VTE Prophylaxis                        7.8    8.42  )-----------( 104      ( 27 Apr 2022 18:27 )             26.4     PT/INR - ( 27 Apr 2022 18:27 )   PT: 16.9 sec;   INR: 1.45 ratio    PTT - ( 27 Apr 2022 18:27 )  PTT:133.4 sec  Transfusion     [ ] PRBC   [ ] Platelets   [ ] FFP   [ ] Cryoprecipitate      INFECTIOUS DISEASES  T(C): 36.4 (04-28-22 @ 00:10), Max: 37.1 (04-27-22 @ 10:15)  WBC Count: 8.42 K/uL (04-27 @ 18:27)  WBC Count: 10.71 K/uL (04-27 @ 06:47)    Recent Cultures:    Meds: piperacillin/tazobactam IVPB.. 3.375 Gram(s) IV Intermittent every 8 hours  vancomycin  IVPB 1250 milliGRAM(s) IV Intermittent every 12 hours      ENDOCRINE  Capillary Blood Glucose    Meds: atorvastatin 40 milliGRAM(s) Oral at bedtime  finasteride 5 milliGRAM(s) Oral daily  insulin lispro (ADMELOG) corrective regimen sliding scale   SubCutaneous at bedtime  insulin lispro (ADMELOG) corrective regimen sliding scale   SubCutaneous three times a day before meals  levothyroxine 25 MICROGram(s) Oral daily      ACCESS DEVICES:  [x] Peripheral IV  [ ] Central Venous Line	[ ] R	[ ] L	[ ] IJ	[ ] Fem	[ ] SC	Placed:   [ ] Arterial Line		[ ] R	[ ] L	[ ] Fem	[ ] Rad	[ ] Ax	Placed:   [ ] PICC:					[ ] Mediport  [x] Urinary Catheter, Date Placed: 4/27 for retention  [x] Necessity of urinary, arterial, and venous catheters discussed      OTHER MEDICATIONS:    CODE STATUS: full code    IMAGING:

## 2022-04-28 NOTE — PROVIDER CONTACT NOTE (MEDICATION) - ASSESSMENT
Pt shows no active s/s bleeding, infiltrated IV d/c. No c/o pain or discomfort at former IV site. Heparin gtt re-initiated at alternate IV site.
See VS flowsheet. No c/o pain, adequate urine output, last FS @ approx 23:40 133.
Pt shows no active s/s hyperglycemia, IV in which insulin had been infusing last night is patent w/ blood return.

## 2022-04-28 NOTE — PROVIDER CONTACT NOTE (MEDICATION) - ACTION/TREATMENT ORDERED:
3 units regular insulin IVP, restart insulin gtt @3 units/hr. Will continue to monitor per policy.
As per PA Anshul do not given 22:00 meds that were missed. Will restart medications on 4/28 in am. Will continue to monitor pt.
Restart heparin gtt @1600 units/hr, as pt was therapeutic at that dose, draw aPTT 6 hours after change in rate, ~0200. Will continue to monitor.

## 2022-04-28 NOTE — PROVIDER CONTACT NOTE (MEDICATION) - SITUATION
Pt's PIV in which heparin was infusing was infiltrated for unknown amount of time, currently @2000 units/hr.
22:00 medications not given on 9 Jaime d/t RRT. Meds not given include lipitor, flowmax, coumadin, lantus, senna, tylenol, IVP Lasix. Should I give medications now.
Pt's insulin gtt was disconnected from IV for unknown amount of time, had been reconnected ~1930 @ 7 units/hr. Pt has increasing hyperglycemia for several hours, now in 300s for 2000.

## 2022-04-28 NOTE — PROGRESS NOTE ADULT - SUBJECTIVE AND OBJECTIVE BOX
Ritesh Bell MD  Interventional Cardiology / Advance Heart Failure and Cardiac Transplant Specialist  [place] Office : [phone] [place] [address] [place] [address]  Tel:   [place] Office : [address] [facility] [place] [address]  Tel: [phone]      Subjective/Overnight events: Pt is lying in bed s/p RRTs 4/27 for AMS now in SICU. patient awake and alert today. denies CP, SOB or palpitations  	  MEDICATIONS:  furosemide    Tablet 20 milliGRAM(s) Oral two times a day  heparin  Infusion 1550 Unit(s)/Hr IV Continuous <Continuous>  metoprolol succinate ER 25 milliGRAM(s) Oral daily  tamsulosin 0.8 milliGRAM(s) Oral at bedtime    piperacillin/tazobactam IVPB.. 3.375 Gram(s) IV Intermittent every 8 hours  vancomycin  IVPB 1250 milliGRAM(s) IV Intermittent every 12 hours    albuterol/ipratropium for Nebulization 3 milliLiter(s) Nebulizer every 6 hours  buDESOnide    Inhalation Suspension 0.5 milliGRAM(s) Inhalation every 12 hours  guaiFENesin ER 1200 milliGRAM(s) Oral every 12 hours  montelukast 10 milliGRAM(s) Oral daily    acetaminophen     Tablet .. 975 milliGRAM(s) Oral every 6 hours    pantoprazole    Tablet 40 milliGRAM(s) Oral before breakfast  polyethylene glycol 3350 17 Gram(s) Oral daily  senna 2 Tablet(s) Oral at bedtime    atorvastatin 40 milliGRAM(s) Oral at bedtime  finasteride 5 milliGRAM(s) Oral daily  insulin lispro (ADMELOG) corrective regimen sliding scale   SubCutaneous at bedtime  insulin lispro (ADMELOG) corrective regimen sliding scale   SubCutaneous three times a day before meals  levothyroxine 25 MICROGram(s) Oral daily    multivitamin/minerals 1 Tablet(s) Oral daily      PAST MEDICAL/SURGICAL HISTORY  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    Deep Vein Thrombosis (DVT)  17 yeras ago on Coumadin    CHF (congestive heart failure)  last exacerbation in 1/2017    Enlarged prostate    GERD (gastroesophageal reflux disease)    Hernia  umblical    Calculus of bile duct without cholangitis or cholecystitis without obstruction    Atrial fibrillation    S/P Hernia Repair    S/P cataract surgery, unspecified laterality    S/P ERCP  3/2017        SOCIAL HISTORY: Substance Use (street drugs): ( x ) never used  (  ) other:    FAMILY HISTORY:          PHYSICAL EXAM:  T(C): 36.6 (04-28-22 @ 08:00), Max: 36.6 (04-27-22 @ 16:29)  HR: 64 (04-28-22 @ 10:00) (60 - 86)  BP: 133/60 (04-28-22 @ 10:00) (122/62 - 174/73)  RR: 18 (04-28-22 @ 10:00) (12 - 39)  SpO2: 95% (04-28-22 @ 10:00) (86% - 100%)  Wt(kg): --  I&O's Summary    27 Apr 2022 07:01  -  28 Apr 2022 07:00  --------------------------------------------------------  IN: 894 mL / OUT: 1315 mL / NET: -421 mL    28 Apr 2022 07:01  -  28 Apr 2022 11:00  --------------------------------------------------------  IN: 146.5 mL / OUT: 105 mL / NET: 41.5 mL          EYES:   PERRLA   ENMT:   Moist mucous membranes, Good dentition, No lesions  Cardiovascular: Normal S1 S2, No JVD, No murmurs, No edema  Respiratory: b/l expiratory wheeze   Gastrointestinal:  Soft, Non-tender, + BS	  Extremities: s/p right BKA                                    8.5    9.29  )-----------( 118      ( 28 Apr 2022 03:35 )             28.7     04-28    145  |  106  |  34<H>  ----------------------------<  135<H>  3.7   |  26  |  0.93    Ca    8.9      28 Apr 2022 03:35  Phos  3.7     04-28  Mg     2.0     04-28    TPro  5.3<L>  /  Alb  2.2<L>  /  TBili  0.5  /  DBili  x   /  AST  9<L>  /  ALT  14  /  AlkPhos  55  04-27    proBNP:   Lipid Profile:   HgA1c:   TSH:     Consultant(s) Notes Reviewed:  [x ] YES  [ ] NO    Care Discussed with Consultants/Other Providers [ x] YES  [ ] NO    Imaging Personally Reviewed independently:  [x] YES  [ ] NO    All labs, radiologic studies, vitals, orders and medications list reviewed. Patient is seen and examined at bedside. Case discussed with medical team.

## 2022-04-28 NOTE — PROVIDER CONTACT NOTE (MEDICATION) - BACKGROUND
See H+P.
Pt was therapeutic x1 on 4/7 @1600 units/hr.
Pt is maintained on insulin gtt for uncontrolled hyperglycemia for 1 day. Pt had become increasingly hyperglycemic throughout day after blood glucose came under control during the day.

## 2022-04-28 NOTE — PROGRESS NOTE ADULT - TIME BILLING
continues to make gradual improvements  continuing to wean down on oxygen  encouraging mobilization  is on an insulin drip - this is likely in relation to the steroids needed for COPD exacerbation
patient sustained two RRT activations both for lethargy  overall etiology unclear but patient markedly deconditioned, weak.  has underlying COPD and recently admitted to SICU for COPD exacerbation  vascular team notified to evaluate surgical site if this could be source of septic response  will tightly regulate glucose levels  will continue to monitor in SICU setting
patient extubated this morning after performing a spontaneous breathing trial  patient is breathing comfortably  I spoke with patients daughter and updated with events  local wound care to right lower extremity amputation site  will monitor breathing closely in view of recent ARDS

## 2022-04-28 NOTE — PROVIDER CONTACT NOTE (OTHER) - REASON
RRT
3+ pitting edema to the left arm, index finger pain, distal to L20G IV insertion site.
Pt with expiratory wheezes and decreased oxygen saturation
insulin gtt not infusing
Adair Catheter inserted prior to DTV time at 0300

## 2022-04-28 NOTE — PROGRESS NOTE ADULT - ASSESSMENT
86M PMH HTN, HLD, DM2 and nonhealing wounds of RLE who is non-ambulatory at home now s/p right guillotine BELOW knee amputation. Postoperative course c/b severe COPD exacerbation requiring excalation of O2 supplementation with HFNC. Now s/p R BKA completion 4/22. Received 1U yesterday. Pt has failed numerous TOV. Eventual placement of hudson catheter. On 4/27 he had 2 rapids called on him and was transferred to the SICU for monitoring. He is stable and doing better.    Plan:  - F/u Hb  - Daily dressing changes  - Pain control   - Continue hep gtt, bridging to warfarin (dosed 7.55mg 4/26)  - CC Regs  - Hudson in place will be dc with Hudson   - ISS  - Per podiatry re: left heel discoloration- cont with z flow boot in bed at all times, leave open to air  - Per cards: post op ok to transition to coumadin or Eliquis AND aspirin  - Home meds   - PT Re-eval: VAUGHN  - PM&R eval: agree on VAUGHN  - Dispo: VAUGHN, family given choices    Vascular Surgery  2225

## 2022-04-28 NOTE — PROVIDER CONTACT NOTE (OTHER) - ASSESSMENT
FS currently 330 on glucometer. Insulin gtt infusing bed.
Pt was bladder scanned prior to hudson insertion and was retaining 452.
Flushed L20g IV insertion site - no resistant present, no redness, no tenderness.
Pt was not responsive to sternal rub, wouldn't open eyes and wound not respond verbally.
Pt with expiratory wheezes noted, oxygen saturation 84% on 2L NC, improved to 90% with 4L, pt denies chest pain or SOB

## 2022-04-29 NOTE — PROGRESS NOTE ADULT - ASSESSMENT
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 Post-op course c/b severe COPD exacerbation requiring respiratory support and continuous monitoring in SICU. After improvement, pt sent to the floor where the patient's pulmonary status was optimized by pulmonology and the RLE BKA was formalized on 4/22. On 4/27, rapid response called for lethargy 2/2 hypoglycemia which improved upon glucose administration. Shortly afterwards, pt with additional episode of lethargy despite normal blood glucose and stable vital signs/labs. Pt taken for CTH, then transferred to SICU for close monitoring.     PLAN:  Neuro: post-operative pain, lethargy 2/2 sepsis 2/2 RLE stump infection?  - Monitor mental status  - Pain control as needed with acetaminophen, oxycodone prn q6hrs  - Avoiding opioids and gabapentin iso lethargy  - Q4hrs neuro checks iso lethargy    Resp: hx COPD  - Monitor pulse oximeter  - Continue Duoneb, Pulmicort, montelukast for COPD  - Diuresis with PO furosemide 20mg BID  - Chest PT, robitussin for secretions  - Out of bed to chair, ambulate as tolerated, and incentive spirometry to prevent atelectasis    CV: hx HLD  - Continue metoprolol ER 25mg daily  - Monitor vital signs    GI: no acute issues  - Soft and Bite sized (consistent carb) diet as tolerated  - Bowel regimen with senna & Miralax  - Continue home protonix    Renal: urinary retention  - Monitor I&Os  - Monitor electrolytes and replete as necessary  - Adair replaced 4/27 for retention; continue tamsulosin and finasteride    Heme:  - Monitor CBC and coags  - Heparin infusion for a-fib  - Was being transitioned to warfarin, although held due to concern for infected RLE stump with potential for RTOR    ID: possible RLE stump infection  - Monitor for clinical evidence of active infection  - Empiric zosyn (4/27 - ?)  - MRSA swab negative, vancomycin discontinued (4/27 - 4/28)  - BCx sent 4/28  - UA negative 4/27  - Procal 0.37 on readmission to SICU    Endo: hx DM, hx HLD, hx hypothyroidism  - Home statin  - Monitor glucose qACHS with ISS  - Home synthroid    Disposition: Transfer to SICU    Code Status: Full code

## 2022-04-29 NOTE — PROGRESS NOTE ADULT - SUBJECTIVE AND OBJECTIVE BOX
[facility] - PROGRESS NOTE    CHIEF COMPLAINT: acute hypoxic respiratory failure; COPD exacerbation; mucous plugging; atelectasis; weak cough; pleural effusion; dysphagia; right foot gangrene s/p BKA    INTERVAL HISTORY: out of bed and into the chair for many hours earlier - now up in bed; much more awake and alert but still with some confusion; continues on antibiotics due to concern of a stump infection; s/p completion right BKA  -> right leg pain is improved despite being off narcotics and gabapentin; no shortness of breath or hypoxemia on room air; occasional weak cough productive of scant sputum; persistent chest congestion and wheeze exacerbated when eating; no fevers, chills or sweats; no chest pain/pressure or palpitations; FEEST revealed severe dysphagia -> non oral nutrition recommended -> the family has elected to continue oral feeding understanding the risks of aspiration (small bites and easy to chew diet with moderately thickened fluids ordered); left facial droop and left sided weakness are at baseline); received 1 unit PRBCs  for post-operative anemia; hudson remains in place due to urinary retention      REVIEW OF SYSTEMS:  Constitutional: As per interval history  HEENT: Within normal limits  CV: As per interval history  Resp: As per interval history  GI: dysphagia  : Within normal limits  Musculoskeletal: Within normal limits  Skin: Within normal limits  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 3.375 Gram(s) IV Intermittent every 8 hours    Cardiovascular:  furosemide    Tablet 20 milliGRAM(s) Oral two times a day  metoprolol succinate ER 25 milliGRAM(s) Oral daily  tamsulosin 0.8 milliGRAM(s) Oral at bedtime    Other:  acetaminophen     Tablet .. 975 milliGRAM(s) Oral every 6 hours  atorvastatin 40 milliGRAM(s) Oral at bedtime  finasteride 5 milliGRAM(s) Oral daily  heparin  Infusion 1550 Unit(s)/Hr IV Continuous <Continuous>  insulin lispro (ADMELOG) corrective regimen sliding scale   SubCutaneous at bedtime  insulin lispro (ADMELOG) corrective regimen sliding scale   SubCutaneous three times a day before meals  levothyroxine 25 MICROGram(s) Oral daily  multivitamin/minerals 1 Tablet(s) Oral daily  pantoprazole    Tablet 40 milliGRAM(s) Oral before breakfast  polyethylene glycol 3350 17 Gram(s) Oral daily  senna 2 Tablet(s) Oral at bedtime  warfarin 5 milliGRAM(s) Oral once    MEDICATIONS  (PRN):  oxyCODONE    IR 5 milliGRAM(s) Oral every 6 hours PRN Severe Pain (7 - 10)    OBJECTIVE:    POCT Blood Glucose.: 133 mg/dL (2022 17:40)  POCT Blood Glucose.: 230 mg/dL (2022 11:54)  POCT Blood Glucose.: 230 mg/dL (2022 21:53)      PHYSICAL EXAM:       ICU Vital Signs Last 24 Hrs  T(C): 36.6 (2022 11:00), Max: 36.7 (2022 20:00)  T(F): 97.9 (2022 11:00), Max: 98.1 (2022 20:00)  HR: 78 (2022 17:44) (63 - 102)  BP: 109/70 (2022 15:00) (105/51 - 156/70)  BP(mean): 85 (2022 15:00) (74 - 127)  ABP: --  ABP(mean): --  RR: 13 (2022 15:00) (13 - 48)  SpO2: 96% (2022 17:44) (87% - 99%) on room air     General: Awake. Alert. Much less confused. Cooperative. No distress. Appears stated age. Obese.   HEENT: Atraumatic. Normocephalic. Anicteric. Normal oral mucosa. PERRL. EOMI.  Neck: Supple. Trachea midline. Thyroid without enlargement/tenderness/nodules. No carotid bruit. No JVD.	  Cardiovascular: Regular rate and rhythm. Distant S1 S2. No murmurs, rubs or gallops.  Respiratory: Respirations unlabored. Bilateral rhonchi and wheeze. No curvature.  Abdomen: Soft. Non-tender. Non-distended. No organomegaly. No masses. Normal bowel sounds.  Extremities: R BKA stump with operative dressing in place that is c/d/i - no strikethrough noted - stump appears swollen, erythematous and discolored along the stump line - staples are intact with out any purulent or serosanguinous discharge - knee immobilizer in place. Decreased swelling and pitting edema of the left upper extremity  Pulses: Decreased peripheral pulses LLE  Skin: Venous stasis changes left lower extremity  Lymph Nodes: Cervical, supraclavicular and axillary nodes normal  Neurological: Left sided weakness left > arm. Left facial droop. A and O x 1  Psychiatry: Appropriate mood and affect.    LABS:                          7.5    9.32  )-----------( 105      ( 2022 00:36 )             24.5     CBC    WBC  9.32 <==, 9.29 <==, 8.42 <==, 10.71 <==, 11.82 <==, 15.28 <==, 15.37 <==    Hemoglobin  7.5 <<==, 8.5 <<==, 7.8 <<==, 8.2 <<==, 6.9 <<==, 7.3 <<==, 7.1 <<==    Hematocrit  24.5 <==, 28.7 <==, 26.4 <==, 26.3 <==, 23.5 <==, 24.7 <==, 24.1 <==    Platelets  105 <==, 118 <==, 104 <==, 127 <==, 152 <==, 180 <==, 206 <==      141  |  104  |  42<H>  ----------------------------<  232<H>      4.0   |  22  |  1.27      LYTES    sodium  141 <==, 145 <==, 144 <==, 142 <==, 144 <==, 145 <==, 143 <==    potassium   4.0 <==, 3.7 <==, 3.7 <==, 3.9 <==, 3.9 <==, 4.0 <==, 4.2 <==    chloride  104 <==, 106 <==, 106 <==, 105 <==, 105 <==, 106 <==, 105 <==    carbon dioxide  22 <==, 26 <==, 26 <==, 26 <==, 26 <==, 26 <==, 26 <==    =============================================================================================  RENAL FUNCTION:    Creatinine:   1.27  <<==, 0.93  <<==, 0.98  <<==, 0.99  <<==, 1.10  <<==, 1.24  <<==, 1.29  <<==    BUN:   42 <==, 34 <==, 39 <==, 39 <==, 45 <==, 53 <==, 51 <==    ============================================================================================    calcium   8.2 <==, 8.9 <==, 8.4 <==, 8.5 <==, 8.5 <==, 8.4 <==, 8.6 <==    phos   3.0 <==, 3.7 <==, 3.6 <==, 2.8 <==, 3.0 <==, 3.1 <==, 3.7 <==    mag   1.8 <==, 2.0 <==, 2.0 <==, 2.0 <==, 2.0 <==, 2.0 <==, 2.1 <==    ============================================================================================  LFTs    AST:   9 <==     ALT:  14  <==     AP:  55  <=    Bili:  0.5  <=    PT/INR - ( 2022 00:36 )   PT: 18.2 sec;   INR: 1.58 ratio       PTT - ( 2022 00:36 )  PTT: 74.6 sec    Venous Blood Gas:   @ 03:32  7.39/52/38/32/61.8  VBG Lactate: 1.0    ABG - ( 2022 19:45 )  pH: 7.48  /  pCO2: 41    /  pO2: 106   / HCO3: 30    / Base Excess: 6.4   /  SaO2: 99.3      Venous Blood Gas:   @ 22:10  7.40/41/52/25/84.4  VBG Lactate: 1.8    Venous Blood Gas:   @ 22:23  7.40/39/45/24/78.1  VBG Lactate: 2.4    Venous Blood Gas:   @ 22:23  7.40/39/45/24/78.1  VBG Lactate: 2.4    Venous Blood Gas:  04-10 @ 23:19  7.39/42/44/25/72.2  VBG Lactate: 2.5    Procalcitonin, Serum: 0.33 ng/mL ( @ 03:35)    CARDIAC MARKERS ( 2022 20:08 )  CPK x     /CKMB x     /CKMB Units 2.9 ng/mL    troponin x        CARDIAC MARKERS ( 2022 18:27 )  CPK x     /CKMB x     /CKMB Units x        troponin 204 ng/L    < from: TTE with Doppler (w/Cont) (22 @ 15:07) >    Patient name: Martir Heart  YOB: 1935   Age: 86 (M)   MR#: [id number]  Study Date: 4/3/2022  Location: [address] [place], [place] [id number]: Angie Olivarez MAGO  Study quality: Technically difficult  Referring Physician: Anmol Quinn MD  BloodPressure: 115/70 mmHg  Height: 173 cm  Weight: 92 kg  BSA: 2.1 m2  ------------------------------------------------------------------------  PROCEDURE: Transthoracic echocardiogram with 2-D, M-Mode  and complete spectral and color flow Doppler. Verbal  consent was obtained for injection of  Ultrasonic Enhancing  Agent following a discussion of risks and benefits.  Following intravenous injection of Ultrasonic Enhancing  Agent, harmonic imaging was performed.  INDICATION: Cardiomyopathy, unspecified (I42.9)  ------------------------------------------------------------------------  Dimensions:    Normal Values:  LA:     3.1    2.0 - 4.0 cm  Ao:     3.5    2.0 - 3.8 cm  SEPTUM: 1.1    0.6 - 1.2 cm  PWT:    1.3    0.6 - 1.1 cm  LVIDd:  4.3    3.0- 5.6 cm  LVIDs:  3.2    1.8 - 4.0 cm  Derived variables:  LVMI: 90 g/m2  RWT: 0.60  Fractional short: 26 %  EF (Visual Estimate): NWV %  Doppler Peak Velocity (m/sec): MV=1.6 AoV=1.4  ------------------------------------------------------------------------  Conclusions:  1. Aortic valve not well visualized; appears calcified.  Peak transaortic valve gradient equals 8 mm Hg, mean  transaortic valve gradient equals 3 mm Hg, estimated aortic  valve area equals 2 sqcm (by continuity equation), aortic  valve velocity time integral equals 22 cm, consistent with  mild aortic stenosis.  2. Endocardial visualization enhanced with intravenous  injection of Ultrasonic Enhancing Agent (Definity). Mild  left ventricular systolic dysfunction. The inferior wall,  and the inferoseptum are hypokinetic.  3. The right ventricle is not well visualized; grossly  normal right ventricular systolic function.  ------------------------------------------------------------------------  Confirmed on  4/3/2022 - 17:12:14 by BRITTANY Giraldo  ------------------------------------------------------------------------  -----------------------------------------------------------  MICROBIOLOGY:     COVID-19 PCR . (22 @ 18:23)   COVID-19 PCR: NotDetec:     Urinalysis Basic - ( 2022 02:47 )    Color: Yellow / Appearance: Clear / S.021 / pH: x  Gluc: x / Ketone: Negative  / Bili: Negative / Urobili: Negative   Blood: x / Protein: 30 mg/dL / Nitrite: Negative   Leuk Esterase: Negative / RBC: 12 /hpf / WBC 2 /HPF   Sq Epi: x / Non Sq Epi: 0 /hpf / Bacteria: Negative    Culture - Blood (22 @ 09:14)   Specimen Source: .Blood Blood-Peripheral   Culture Results:   No growth to date.     Culture - Blood (22 @ 09:14)   Specimen Source: .Blood Blood-Peripheral   Culture Results:   No growth to date.     RADIOLOGY:  [x] Chest radiographs reviewed and interpreted by me    EXAM:  XR CHEST PORTABLE URGENT 1V                          PROCEDURE DATE:  2022      FINDINGS:    The cardiomediastinal silhouette is not well evaluated in this   projection. Thoracic aortic calcifications. 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  New patchy right basilar opacities.  Worsening patchy left perihilar opacities, most pronounced in the mid to   lower lung.  No right pleural effusion.  There is continued left basilar and retrocardiac opacity.  No pneumothorax.  No acute bony abnormality.    IMPRESSION:  New patchy right basilar opacities which could be due to subsegmental   atelectasis or pneumonia.    Worsening patchy left perihilar opacities, most pronounced in the mid to   lower lung, possibly asymmetric pulmonary edema, atelectasis, and/or   pneumonia.    Continued left basilar and retrocardiac opacity which may be due to a   left pleural effusion with passive atelectasis, atelectasis of other   cause, and/or pneumonia.    DUSTY ZUÑIGA DO;Resident Radiologist  This document has been electronically signed.  IGLESIA DEL ROSARIO MD; Attending Radiologist  This document has been electronically signed. 2022  1:32PM  ---------------------------------------------------------------------------------------------------------------  EXAM:  CT BRAIN                          PROCEDURE DATE:  2022      FINDINGS:    Redemonstration of right cerebellar hemisphere, right greater than left   occipital, and high left posterior frontal chronic infarcts    Similar low density left lateral convexity subdural collection measuring   1 cm in greatest depth.    No midline shift or effacement of basal cisterns. No hydrocephalus.    No acute intracranial hemorrhage or brain edema. Moderate white matter   microvascular ischemic disease.    IMPRESSION:    No hydrocephalus, acute intracranial hemorrhage, mass effect, or brain   edema.    Similar low density left lateral convexity subdural collection measuring   1 cm in greatest depth.    No midline shift or effacement of basal cisterns. No hydrocephalus.    Chronic right cerebellar hemisphere, right greater than left occipital,   and high left posterior frontal infarcts.    KENA CASAS MD; Attending Radiologist  This document has been electronically signed. 2022  9:00AM  ---------------------------------------------------------------------------------------------------------------  EXAM:  DUPLEX EXT VEINS UPPER LT                          PROCEDURE DATE:  2022      IMPRESSION:  No evidence of left upper extremity deep venous thrombosis.    NARCISO MUÑOZ MD; Attending Radiologist  This document has been electronically signed. 2022  8:48PM  ---------------------------------------------------------------------------------------------------------------  EXAM:  XR CHEST PORTABLE URGENT 1V                          PROCEDURE DATE:  2022      FINDINGS:  Left chest wall pacemaker.  The heart size cannot be accurately evaluated on this projection.  Bilateral lower lung hazy opacities, reduced since 2022.  There is no pneumothorax.    IMPRESSION:  Partial clearing of the bases and left effusion.    ANITA INFANTE MD; Resident Radiologist  This document has been electronically signed.  HORACIO HELMS MD; Attending Interventional Radiologist  Thisdocument has been electronically signed. 2022  3:57PM  --------------------------------------------------------------------------------------------------------------

## 2022-04-29 NOTE — PROGRESS NOTE ADULT - ASSESSMENT
86M PMH HTN, HLD, DM2 and nonhealing wounds of RLE who is non-ambulatory at home now s/p right guillotine BELOW knee amputation. Postoperative course c/b severe COPD exacerbation requiring excalation of O2 supplementation with HFNC. Now s/p R BKA completion 4/22. Received 1U yesterday. Pt has failed numerous TOV. Eventual placement of hudson catheter. On 4/27 he had 2 rapids called on him and was transferred to the SICU for monitoring. He is stable and doing better.    - Daily dressing changes  - Pain control   - Continue hep gtt, bridging to warfarin   - CC Regs  - Hudson in place will be dc with Hudson   - ISS  - Per podiatry re: left heel discoloration- cont with z flow boot in bed at all times, leave open to air  - Per cards: post op ok to transition to coumadin or Eliquis AND aspirin  - Home meds   - PT Re-eval: VAUGHN  - PM&R eval: agree on VAUGHN  - Dispo: VAUGHN, family given choices    Vascular Surgery  [id number] 86M PMH HTN, HLD, DM2 and nonhealing wounds of RLE who is non-ambulatory at home now s/p right guillotine BELOW knee amputation. Postoperative course c/b severe COPD exacerbation requiring excalation of O2 supplementation with HFNC. Now s/p R BKA completion 4/22. Received 1U yesterday. Pt has failed numerous TOV. Eventual placement of hudson catheter. On 4/27 he had 2 rapids called on him and was transferred to the SICU for monitoring. He is stable and doing better.    PLAN:  - Daily dressing changes  - Pain control   - Continue hep gtt, bridging to warfarin   - CC Regs  - Hudson in place will be dc with Hudson   - ISS  - Per podiatry re: left heel discoloration- cont with z flow boot in bed at all times, leave open to air  - Per cards: post op ok to transition to coumadin or Eliquis AND aspirin  - Home meds   - PT Re-eval: VAUGHN  - PM&R eval: agree on VAUGHN  - Dispo: VAUGHN, family given [facility]    Vascular Surgery  [id number]

## 2022-04-29 NOTE — PROGRESS NOTE ADULT - ATTENDING COMMENTS
clinically improving  pain control  physical therapy  monitor ischemia to stump  offload stump, knee immobilizer  discharge planning

## 2022-04-29 NOTE — PROGRESS NOTE ADULT - ASSESSMENT
ASSESSMENT:    86 year old gentleman, former smoker, followed by Dr. Alicja Rob of our practice for asthma/COPD overlap  syndrome and obstructive sleep apnea being treated conservatively. He has no history of TOM colonization/infection as listed in the "past medical history". He has been stable from a pulmonary perspective and maintained on budesonide and duoneb once daily and if needed. He also has a history of HTN, HLD, DM, CKD, CVA, CHF (mild systolic dysfunction) and atrial fibrillation with sick sinus syndrome s/p pacemaker implantation. He has been followed for many months for chronic foot wounds and leg pain now with some purulence and gangrene. The pain is exacerbated when laying in bed and improves with tylenol and when hanging the legs off of the bed. He is no longer able to ambulate. 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 The patient has developed marked shortness of breath with severe hypoxemia currently requiring a nasal canula @ 5lpm to maintain saturation @ 90%. He has a cough with copious mucous in his chest which he is unable to expectorate. He has chest congestion and wheeze. No fevers, chills or sweats. No chest pain/pressure or palpitations. Called by the patient's family and asked to be involved with his pulmonary care.    acute hypoxic respiratory failure    1) severe COPD exacerbation -> slowly improving but exacerbated by ongoing aspiration  2) 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 and respiratory muscle weakness limiting diaphragmatic excursion and chest wall expansion -> bibasilar atelectasis has improved on repeat CXR  3) no evidence of pulmonary edema or pneumonia  4) 4/28 -> possible aspiration pneumonia    leukocytosis     steroid induced hyperglycemia    CKD/KARLOS due to diuresis in the setting of euvolemia -> improved    4/14 - remains in the ICU; continues on an insulin infusion for steroid induced hyperglycemia; worsening of his mental status and chronic left sided weakness and left facial droop after analgesics prior to the VAC change yesterday; CT scan and EEG are unremarkable; started on zosyn for possible "sepsis"; now awake and alert sitting in the chair and back to his baseline mental status; no shortness of breath or hypoxemia on room air; occasional cough productive of scant sputum; minimal chest congestion and wheeze; no fevers, chills or sweats; no chest pain/pressure or palpitations; CXR now has bibasilar atelectasis - there is no evidence of pulmonary edema; on heparin gtt for PAD    4/15 - transferred to the surgical floor; mental status is back to baseline; left facial droop and left sided weakness are no worse than usual; continues on zosyn for possible "sepsis"; awake and alert sitting in the chair; no shortness of breath or hypoxemia on room air; occasional cough productive of scant sputum; minimal chest congestion and wheeze; no fevers, chills or sweats; no chest pain/pressure or palpitations; CXR is with a small left pleural effusion and bibasilar atelectasis - there is no evidence of pulmonary edema; on heparin gtt for PAD    4/20 -  left arm swelling ->no evidence of DVT on Duplex; FEEST revealed severe dysphagia -> non oral nutrition recommended -> the family has elected to continue oral feeding understanding the risks of aspiration; mental status is back to baseline; left facial droop and left sided weakness are no worse than usual; continues on zosyn for possible "sepsis"; awake and alert sitting in the chair; no shortness of breath or hypoxemia on room air; occasional cough productive of scant sputum; mild chest congestion and wheeze especially after eating; no fevers, chills or sweats; no chest pain/pressure or palpitations; CXR reveals improved bibasilar atelectasis - there is no evidence of pulmonary edema; on heparin gtt for PAD    4/22 - NPO and off heparin gtt awaiting AKA; awake and alert sitting up in bed; no shortness of breath or hypoxemia on room air; occasional cough productive of scant sputum; chest congestion and wheeze iis much improved and exacerbated when eating; no fevers, chills or sweats; no chest pain/pressure or palpitations; decreased left arm swelling ->no evidence of DVT on Duplex    4/25 - 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; seems sedated on an increased dose of gabapentin and "as needed" ultram for ongoing leg pain; remains on a heparin gtt now being bridged to coumadin; no shortness of breath or hypoxemia on a 2lpm nasal canula; occasional weak cough productive of scant sputum; chest congestion and wheeze is much improved and exacerbated when eating;    4/28 -  confusion yesterday initially due to hypoglycemia off steroids -> improved after glucose -> insulin adjusted; a second episode of altered mental followed with normal glucose -> started on antibiotics due to concern of a stump infection and transferred to the ICU for observation; now more awake and alert but not back to baseline mental status sitting in the chair; 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 -> significant right leg pain continues now off narcotics and gabapentin due to confusion; no shortness of breath or hypoxemia on a 2lpm nasal canula; occasional weak cough productive of scant sputum; persistent chest congestion and wheeze exacerbated when eating; no fevers, chills or sweats; no chest pain/pressure or palpitations; FEEST revealed severe dysphagia -> non oral nutrition recommended -> the family has elected to continue oral feeding understanding the risks of aspiration (small bites and easy to chew diet with moderately thickened fluids ordered); left facial droop and left sided weakness are at baseline); received 1 unit PRBCs 4/26 for post-operative anemia; hudson remains in place due to urinary retention      PLAN/RECOMMENDATIONS:    stable oxygenation on room air  has completed a steroid taper  albuterol/atrovent nebs q6h  pulmicort 0.5mg nebs q12h - give after duoneb - rinse mouth after use  mucinex 1200mg 2 times daily  singulair 10mg @ bedtime  trial of glycopyrrolate 0.1mg IVP q12h x 4 doses - watch for tachycardia, extreme dryness, urinary retention, etc  acapella device/incentive spirometer  zosyn for possible aspiration pneumonia and/or right BKD stump infection - blood cultures are negative - MRSA nasal swab is negative - vancomycin discontinue  speech and swallow evaluation noted - severe dysphagia - NPO with non-oral feeding recommended - I explained the ongoing risk of aspiration with possible pneumonia, worsening bronchospasm, hypoxemia, respiratory failure etc to the patient and family - they do not want placement of a NGT or PEG and wish to continue an oral diet - written for a bite sized and easy to chew diet with moderately thickened fluids - small bites - no straws - chin tuck maneuver explained and demonstrated  cardiac meds: lipitor/toprol XL/lasix - still holding cozaar and metolazone  flomax/proscar -> hudson replaced due to urinary retention  EEG -> mild to moderate nonspecific diffuse or multifocal cerebral dysfunction -  no epileptiform patterns or seizures recorded.  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, stable in appearance compared with prior dated 9/8/2019  CXR 4/18 - improving bilateral lower lobe opacities (atelectasis)  vascular surgery follow-up    heparin gtt bridging to coumadin    pain control - tylenol - oxycodone as needed    s/p completion BKA    wound care  GI prophylaxis - protonix  bowel regimen  glucose control  out of bed and into the chair  LUE Duplex is without DVT    Will follow with you. Plan of care discussed with the patient and his family at bedside and with Dr. Spaulding. Discharge planning.    Sarabjit Wright MD, [facility]  [phone]  Pulmonary Medicine

## 2022-04-29 NOTE — PROGRESS NOTE ADULT - SUBJECTIVE AND OBJECTIVE BOX
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 Post-op course c/b severe COPD exacerbation requiring respiratory support and continuous monitoring in SICU. After improvement, pt sent to the floor where the patient's pulmonary status was optimized by pulmonology and the RLE BKA was formalized on 4/22.    On 4/27, rapid response called for lethargy 2/2 hypoglycemia which improved upon glucose administration. Shortly afterwards, pt with additional episode of lethargy despite normal blood glucose and stable vital signs/labs. Pt taken for CTH, then transferred to SICU for close monitoring.      24 HOUR EVENTS:  - Metoprolol decreased from 50mg to 25mg ER daily  - Naproxen discontinued to preserve kidney function      NEURO  Exam: AOx4, intermittently confused, following commands  Meds: acetaminophen     Tablet .. 975 milliGRAM(s) Oral every 6 hours  oxyCODONE    IR 5 milliGRAM(s) Oral every 6 hours PRN Severe Pain (7 - 10)  [x] Adequacy of sedation and pain control has been assessed and adjusted      RESPIRATORY  RR: 19 (04-29-22 @ 00:00) (12 - 39)  SpO2: 88% (04-29-22 @ 00:00) (88% - 100%)  Exam:   ABG - ( 27 Apr 2022 19:45 )  pH: 7.48  /  pCO2: 41    /  pO2: 106   / HCO3: 30    / Base Excess: 6.4   /  SaO2: 99.3    Meds: albuterol/ipratropium for Nebulization 3 milliLiter(s) Nebulizer every 6 hours  buDESOnide    Inhalation Suspension 0.5 milliGRAM(s) Inhalation every 12 hours  guaiFENesin ER 1200 milliGRAM(s) Oral every 12 hours  montelukast 10 milliGRAM(s) Oral daily      CARDIOVASCULAR  HR: 79 (04-29-22 @ 00:00) (60 - 102)  BP: 135/64 (04-29-22 @ 00:00) (122/59 - 174/73)  BP(mean): 92 (04-29-22 @ 00:00) (84 - 110)  VBG - ( 28 Apr 2022 03:32 )  pH: 7.39  /  pCO2: 52    /  pO2: 38    / HCO3: 32    / Base Excess: 5.7   /  SaO2: 61.8   Lactate: 1.0    Exam: RRR. RLE stump erythematous and tender. No drainage from wound, which is intact  Cardiac Rhythm: normal sinus  Perfusion     [x]Adequate   [ ]Inadequate  Mentation   [x]Baseline       [ ]Reduced  Extremities  [x]Warm         [ ]Cool  Volume Status [ ]Hypervolemic [x]Euvolemic [ ]Hypovolemic  Meds: furosemide    Tablet 20 milliGRAM(s) Oral two times a day  metoprolol succinate ER 25 milliGRAM(s) Oral daily  tamsulosin 0.8 milliGRAM(s) Oral at bedtime      GI/NUTRITION  Exam: soft, nontender, nondistended  Diet: Soft and bite-sized, consistent carb  Meds: pantoprazole    Tablet 40 milliGRAM(s) Oral before breakfast  polyethylene glycol 3350 17 Gram(s) Oral daily  senna 2 Tablet(s) Oral at bedtime      GENITOURINARY  I&O's Detail    04-27 @ 07:01  -  04-28 @ 07:00  --------------------------------------------------------  IN:    Heparin: 124 mL    IV PiggyBack: 650 mL    Oral Fluid: 120 mL  Total IN: 894 mL    OUT:    Indwelling Catheter - Urethral (mL): 1315 mL  Total OUT: 1315 mL    Total NET: -421 mL    04-28 @ 07:01 - 04-29 @ 01:47  --------------------------------------------------------  IN:    Heparin: 263.5 mL    IV PiggyBack: 550 mL    Oral Fluid: 470 mL  Total IN: 1283.5 mL    OUT:    Indwelling Catheter - Urethral (mL): 495 mL  Total OUT: 495 mL    Total NET: 788.5 mL    04-29    141  |  104  |  42<H>  ----------------------------<  232<H>  4.0   |  22  |  1.27    Ca    8.2<L>      29 Apr 2022 00:36  Phos  3.0     04-29  Mg     1.8     04-29    TPro  5.3<L>  /  Alb  2.2<L>  /  TBili  0.5  /  DBili  x   /  AST  9<L>  /  ALT  14  /  AlkPhos  55  04-27    [x] Adair catheter, indication: retention  Meds: magnesium sulfate  IVPB 2 Gram(s) IV Intermittent once  multivitamin/minerals 1 Tablet(s) Oral daily      HEMATOLOGIC  Meds: heparin  Infusion 1550 Unit(s)/Hr IV Continuous <Continuous>  [x] VTE Prophylaxis                        7.5    9.32  )-----------( 105      ( 29 Apr 2022 00:36 )             24.5     PT/INR - ( 29 Apr 2022 00:36 )   PT: 18.2 sec;   INR: 1.58 ratio    PTT - ( 29 Apr 2022 00:36 )  PTT:74.6 sec  Transfusion     [ ] PRBC   [ ] Platelets   [ ] FFP   [ ] Cryoprecipitate      INFECTIOUS DISEASES  T(C): 36.7 (04-29-22 @ 00:00), Max: 36.7 (04-28-22 @ 20:00)  WBC Count: 9.32 K/uL (04-29 @ 00:36)  WBC Count: 9.29 K/uL (04-28 @ 03:35)    Recent Cultures:    Meds: piperacillin/tazobactam IVPB.. 3.375 Gram(s) IV Intermittent every 8 hours      ENDOCRINE  Capillary Blood Glucose    Meds: atorvastatin 40 milliGRAM(s) Oral at bedtime  finasteride 5 milliGRAM(s) Oral daily  insulin lispro (ADMELOG) corrective regimen sliding scale   SubCutaneous at bedtime  insulin lispro (ADMELOG) corrective regimen sliding scale   SubCutaneous three times a day before meals  levothyroxine 25 MICROGram(s) Oral daily      ACCESS DEVICES:  [x] Peripheral IV  [ ] Central Venous Line	[ ] R	[ ] L	[ ] IJ	[ ] Fem	[ ] SC	Placed:   [ ] Arterial Line		[ ] R	[ ] L	[ ] Fem	[ ] Rad	[ ] Ax	Placed:   [ ] PICC:					[ ] Mediport  [x] Urinary Catheter, Date Placed: 4/27 for retention  [x] Necessity of urinary, arterial, and venous catheters discussed      OTHER MEDICATIONS:    CODE STATUS: full code    IMAGING:

## 2022-04-29 NOTE — PROGRESS NOTE ADULT - SUBJECTIVE AND OBJECTIVE BOX
Ritesh Bell MD  Interventional Cardiology / Advance Heart Failure and Cardiac Transplant Specialist  [place] Office : [address] [place] [address] [place] [address]  Tel:   [place] Office : [address] [facility] [place] [address]  Tel: [phone]       Pt is lying in bed slightly confused  	  MEDICATIONS:  furosemide    Tablet 20 milliGRAM(s) Oral two times a day  heparin  Infusion 1550 Unit(s)/Hr IV Continuous <Continuous>  metoprolol succinate ER 25 milliGRAM(s) Oral daily  tamsulosin 0.8 milliGRAM(s) Oral at bedtime    piperacillin/tazobactam IVPB.. 3.375 Gram(s) IV Intermittent every 8 hours    albuterol/ipratropium for Nebulization 3 milliLiter(s) Nebulizer every 6 hours  buDESOnide    Inhalation Suspension 0.5 milliGRAM(s) Inhalation every 12 hours  guaiFENesin ER 1200 milliGRAM(s) Oral every 12 hours  montelukast 10 milliGRAM(s) Oral daily    acetaminophen     Tablet .. 975 milliGRAM(s) Oral every 6 hours  oxyCODONE    IR 5 milliGRAM(s) Oral every 6 hours PRN    glycopyrrolate Injectable 0.1 milliGRAM(s) IV Push two times a day  pantoprazole    Tablet 40 milliGRAM(s) Oral before breakfast  polyethylene glycol 3350 17 Gram(s) Oral daily  senna 2 Tablet(s) Oral at bedtime    atorvastatin 40 milliGRAM(s) Oral at bedtime  finasteride 5 milliGRAM(s) Oral daily  insulin glargine Injectable (LANTUS) 10 Unit(s) SubCutaneous at bedtime  insulin lispro (ADMELOG) corrective regimen sliding scale   SubCutaneous three times a day before meals  insulin lispro (ADMELOG) corrective regimen sliding scale   SubCutaneous at bedtime  levothyroxine 25 MICROGram(s) Oral daily    multivitamin/minerals 1 Tablet(s) Oral daily      PAST MEDICAL/SURGICAL HISTORY  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    Deep Vein Thrombosis (DVT)  17 yeras ago on Coumadin    CHF (congestive heart failure)  last exacerbation in 1/2017    Enlarged prostate    GERD (gastroesophageal reflux disease)    Hernia  umblical    Calculus of bile duct without cholangitis or cholecystitis without obstruction    Atrial fibrillation    S/P Hernia Repair    S/P cataract surgery, unspecified laterality    S/P ERCP  3/2017        SOCIAL HISTORY: Substance Use (street drugs): ( x ) never used  (  ) other:    FAMILY HISTORY:         PHYSICAL EXAM:  T(C): 36.8 (04-29-22 @ 23:00), Max: 36.9 (04-29-22 @ 19:00)  HR: 85 (04-29-22 @ 23:00) (63 - 90)  BP: 147/80 (04-29-22 @ 23:00) (105/51 - 156/70)  RR: 18 (04-29-22 @ 23:00) (13 - 48)  SpO2: 92% (04-29-22 @ 23:00) (87% - 99%)  Wt(kg): --  I&O's Summary    28 Apr 2022 07:01  -  29 Apr 2022 07:00  --------------------------------------------------------  IN: 1592 mL / OUT: 745 mL / NET: 847 mL    29 Apr 2022 07:01  -  30 Apr 2022 00:04  --------------------------------------------------------  IN: 232.5 mL / OUT: 705 mL / NET: -472.5 mL          GENERAL: NAD  EYES:   PERRLA   ENMT:   Moist mucous membranes, Good dentition, No lesions  Cardiovascular: Normal S1 S2, No JVD, No murmurs, No edema  Respiratory: b/l rhonchi   Gastrointestinal:  Soft, Non-tender, + BS	  Extremities: s/p AKA                                    7.5    9.32  )-----------( 105      ( 29 Apr 2022 00:36 )             24.5     04-29    141  |  104  |  42<H>  ----------------------------<  232<H>  4.0   |  22  |  1.27    Ca    8.2<L>      29 Apr 2022 00:36  Phos  3.0     04-29  Mg     1.8     04-29      proBNP:   Lipid Profile:   HgA1c:   TSH:     Consultant(s) Notes Reviewed:  [x ] YES  [ ] NO    Care Discussed with Consultants/Other Providers [ x] YES  [ ] NO    Imaging Personally Reviewed independently:  [x] YES  [ ] NO    All labs, radiologic studies, vitals, orders and medications list reviewed. Patient is seen and examined at bedside. Case discussed with medical team.

## 2022-04-29 NOTE — PROGRESS NOTE ADULT - SUBJECTIVE AND OBJECTIVE BOX
GENERAL SURGERY PROGRESS NOTE   ___________________________________________________________________    LIBRA PEÑA | [id number] | 87y Male | [facility] 8ICU 14 | LOS 28d    Attending: Anmol Quinn    ___________________________________________________________________    CC: Patient is a 87y old  Male who presents with a chief complaint of Preoperative Planning for Right above knee amputation (2022 01:47)      SUBJECTIVE:   Patient seen today during morning rounds at bedside and found to be without acute distress. Denies chest pain, fever, severe pain, or SOB.     Overnight: Unremarkable    Allegies:  NKDA    OBJECTIVE:  Vitals:    T(C): 36.7 (22 @ 00:00), Max: 36.7 (22 @ 20:00)  HR: 72 (22 @ 03:00) (60 - 102)  BP: 122/56 (22 @ 03:00) (116/59 - 153/72)  RR: 14 (22 @ 03:00) (12 - 25)  SpO2: 87% (22 @ 03:00) (87% - 100%)      OUT:    Indwelling Catheter - Urethral (mL): 1315 mL  Total OUT: 1315 mL      OUT:    Indwelling Catheter - Urethral (mL): 580 mL  Total OUT: 580 mL        Physical Exam:  Gen: NAD   CV: Regular rate  Resp: Nonlabored breathing with face mask in place  Ext: R BKA stump with operative dressing in place that is c/d/i, no strikethrough noted, stump appears swollen, erythematous and with increase skin discoloration along the stump line, staples are intact with out any purulent or serosanguinous discharge, knee immobilizer in place    Medications:  heparin  Infusion 1550 IV Continuous <Continuous>  piperacillin/tazobactam IVPB.. 3.375 IV Intermittent every 8 hours    acetaminophen     Tablet .. 975 milliGRAM(s) Oral every 6 hours  albuterol/ipratropium for Nebulization 3 milliLiter(s) Nebulizer every 6 hours  buDESOnide    Inhalation Suspension 0.5 milliGRAM(s) Inhalation every 12 hours  furosemide    Tablet 20 milliGRAM(s) Oral two times a day  guaiFENesin ER 1200 milliGRAM(s) Oral every 12 hours  metoprolol succinate ER 25 milliGRAM(s) Oral daily  montelukast 10 milliGRAM(s) Oral daily  pantoprazole    Tablet 40 milliGRAM(s) Oral before breakfast  polyethylene glycol 3350 17 Gram(s) Oral daily  senna 2 Tablet(s) Oral at bedtime  tamsulosin 0.8 milliGRAM(s) Oral at bedtime        Laboratory:  WBC: 9.32 H&H: 7.5/24.5 Plt: 105  WBC: 9.29 H&H: 8.5/28.7 Plt: 118    Chemistry:                               Phos: 3.0 M.8  141  |  104  |  42  ----------------------------<  232  4.0   |  22  |  1.27        ,                              Phos: 3.7 M.0  145  |  106  |  34  ----------------------------<  135  3.7   |  26  |  0.93             TPro 5.3<L> / Alb 2.2<L> / TBili 0.5 / DBili x  / AST/AST 9<L>/14 / AlkPhos 55  PTT 74.6 PT/INR 18.2/1.58    ABG - ( 2022 19:45 )  pH, Arterial: 7.48  pH, Blood: x     /  pCO2: 41    /  pO2: 106   / HCO3: 30    / Base Excess: 6.4   /  SaO2: 99.3                  Reviewed laboratory and imaging     GENERAL SURGERY PROGRESS NOTE   ___________________________________________________________________    LIBRA PEÑA | [id number] | 87y Male | [facility] 8[facility] 14 | LOS 28d    Attending: Anmol Quinn    ___________________________________________________________________    CC: Patient is a 87y old  Male who presents with a chief complaint of Preoperative Planning for Right above knee amputation (2022 01:47)      SUBJECTIVE:   Patient seen today during morning rounds at bedside and found to be without acute distress. Complains of mild pain in the RLE. Patient OOB in chair.   Overnight: Unremarkable    Allegies:  NKDA    OBJECTIVE:  Vitals:    T(C): 36.7 (22 @ 00:00), Max: 36.7 (22 @ 20:00)  HR: 72 (22 @ 03:00) (60 - 102)  BP: 122/56 (22 @ 03:00) (116/59 - 153/72)  RR: 14 (22 @ 03:00) (12 - 25)  SpO2: 87% (22 @ 03:00) (87% - 100%)      OUT:    Indwelling Catheter - Urethral (mL): 1315 mL  Total OUT: 1315 mL      OUT:    Indwelling Catheter - Urethral (mL): 580 mL  Total OUT: 580 mL        Physical Exam:  Gen: NAD   CV: Regular rate  Resp: Nonlabored breathing with face mask in place  Ext: R BKA stump with operative dressing in place that is c/d/i, no strikethrough noted, stump appears swollen, erythematous and with increase skin discoloration along the stump line, staples are intact with out any purulent or serosanguinous discharge, knee immobilizer in place    Medications:  heparin  Infusion 1550 IV Continuous <Continuous>  piperacillin/tazobactam IVPB.. 3.375 IV Intermittent every 8 hours    acetaminophen     Tablet .. 975 milliGRAM(s) Oral every 6 hours  albuterol/ipratropium for Nebulization 3 milliLiter(s) Nebulizer every 6 hours  buDESOnide    Inhalation Suspension 0.5 milliGRAM(s) Inhalation every 12 hours  furosemide    Tablet 20 milliGRAM(s) Oral two times a day  guaiFENesin ER 1200 milliGRAM(s) Oral every 12 hours  metoprolol succinate ER 25 milliGRAM(s) Oral daily  montelukast 10 milliGRAM(s) Oral daily  pantoprazole    Tablet 40 milliGRAM(s) Oral before breakfast  polyethylene glycol 3350 17 Gram(s) Oral daily  senna 2 Tablet(s) Oral at bedtime  tamsulosin 0.8 milliGRAM(s) Oral at bedtime        Laboratory:  WBC: 9.32 H&H: 7.5/24.5 Plt: 105  WBC: 9.29 H&H: 8.5/28.7 Plt: 118    Chemistry:                               Phos: 3.0 M.8  141  |  104  |  42  ----------------------------<  232  4.0   |  22  |  1.27        ,                              Phos: 3.7 M.0  145  |  106  |  34  ----------------------------<  135  3.7   |  26  |  0.93             TPro 5.3<L> / Alb 2.2<L> / TBili 0.5 / DBili x  / AST/AST 9<L>/14 / AlkPhos 55  PTT 74.6 PT/INR 18.2/1.58    ABG - ( 2022 19:45 )  pH, Arterial: 7.48  pH, Blood: x     /  pCO2: 41    /  pO2: 106   / HCO3: 30    / Base Excess: 6.4   /  SaO2: 99.3                  Reviewed laboratory and imaging

## 2022-04-29 NOTE — PROGRESS NOTE ADULT - ASSESSMENT
85 y/o M w/ uncontrolled Type 2 DM complicated by neuropathy and nephropathy with hyperglycemia exacerbated by steroids> off steroids now. Alos HTN, HLD, hypothyroidism admitted for R BKA completion 4/22. On 4/27 he had 2 rapids called on him and was transferred to the SICU for monitoring. He is stable and doing better. Tolerating POs but remains with poor PO intake. BG in last 24 high 100s to low 200s while on correction scale. Would  c/w present regimen for now [facility] ebased on pt's age and comorbidities his BG goal should be 100s to low 200s. Recommending to administer antibiotic in NS instead of D5 and administer Synthroid  at least one hour apart from food and other meds to ensure absorption. Discussed with ICU team

## 2022-04-29 NOTE — PROGRESS NOTE ADULT - SUBJECTIVE AND OBJECTIVE BOX
DIABETES FOLLOW UP NOTE: Saw pt earlier today    Chief Complaint: Endocrine consult requested for management of T2DM    INTERVAL HX: Pt stable, saw pt in SICU. Per pt and RN eating <50% of meals. Noted no POC done ac breakfast even though pt ate> 12MN glucose 232 by BMP and  AC lunch 230. No further hypoglycemia. Pt noted to be off basal insulin at this time and on correctional scale requiring  1 unit correction most of the time with BG mid 100s to low 200s. Pt states feeling slightly better but weak with surgical pain in RLE on and off   Also noted Synthroid given with Protonix in am. On Zosyn given in D5 infusion      Review of Systems:  General: As above  Cardiovascular: No chest pain, palpitations  Respiratory: No SOB, no cough  GI: No nausea, vomiting, abdominal pain  Endocrine: No S&Sx of hypoglycemia    Allergies    No Known Allergies    Intolerances      MEDICATIONS:  atorvastatin 40 milliGRAM(s) Oral at bedtime  finasteride 5 milliGRAM(s) Oral daily  insulin lispro (ADMELOG) corrective regimen sliding scale   SubCutaneous at bedtime  insulin lispro (ADMELOG) corrective regimen sliding scale   SubCutaneous three times a day before meals  levothyroxine 25 MICROGram(s) Oral daily  piperacillin/tazobactam IVPB.. 3.375 Gram(s) IV Intermittent every 8 hours      PHYSICAL EXAM:  VITALS: T(C): 36.6 (04-29-22 @ 11:00)  T(F): 97.9 (04-29-22 @ 11:00), Max: 98.1 (04-28-22 @ 20:00)  HR: 65 (04-29-22 @ 15:00) (63 - 102)  BP: 109/70 (04-29-22 @ 15:00) (105/51 - 156/70)  RR:  (13 - 48)  SpO2:  (87% - 100%)  Wt(kg): --  GENERAL: Male laying in bed in NAD. Team just moved pt from chair to bed at time of visit  Abdomen: Soft, nontender, non distended, obese  Extremities: Warm, R AKA on ace bandage with immobilizer   NEURO: Alert and able to answer simple questions.     LABS:  POCT Blood Glucose.: 230 mg/dL (04-29-22 @ 11:54)  POCT Blood Glucose.: 230 mg/dL (04-28-22 @ 21:53)  POCT Blood Glucose.: 193 mg/dL (04-28-22 @ 17:36)  POCT Blood Glucose.: 166 mg/dL (04-28-22 @ 12:22)  POCT Blood Glucose.: 193 mg/dL (04-28-22 @ 08:28)  POCT Blood Glucose.: 133 mg/dL (04-27-22 @ 23:26)  POCT Blood Glucose.: 123 mg/dL (04-27-22 @ 21:17)  POCT Blood Glucose.: 108 mg/dL (04-27-22 @ 19:30)  POCT Blood Glucose.: 144 mg/dL (04-27-22 @ 18:39)  POCT Blood Glucose.: 217 mg/dL (04-27-22 @ 17:58)  POCT Blood Glucose.: 53 mg/dL (04-27-22 @ 17:43)  POCT Blood Glucose.: 54 mg/dL (04-27-22 @ 17:42)  POCT Blood Glucose.: 160 mg/dL (04-27-22 @ 13:50)  POCT Blood Glucose.: 166 mg/dL (04-27-22 @ 13:12)  POCT Blood Glucose.: 183 mg/dL (04-27-22 @ 10:13)  POCT Blood Glucose.: 174 mg/dL (04-26-22 @ 21:33)  POCT Blood Glucose.: 130 mg/dL (04-26-22 @ 18:25)                            7.5    9.32  )-----------( 105      ( 29 Apr 2022 00:36 )             24.5       04-29    141  |  104  |  42<H>  ----------------------------<  232<H>  4.0   |  22  |  1.27      Ca    8.2<L>      04-29  Mg     1.8     04-29  Phos  3.0     04-29    TPro  5.3<L>  /  Alb  2.2<L>  /  TBili  0.5  /  DBili  x   /  AST  9<L>  /  ALT  14  /  AlkPhos  55  04-27      eGFR: 55 mL/min/1.73m2 (04-29-22 @ 00:36)        A1C with Estimated Average Glucose Result: 6.8 % (04-02-22 @ 12:21)      Estimated Average Glucose: 148 mg/dL (04-02-22 @ 12:21)

## 2022-04-29 NOTE — PROGRESS NOTE ADULT - SUBJECTIVE AND OBJECTIVE BOX
[facility] - ORESTES Wall / ORESTES Wynne / KARIE Melendez/ ORESTES Knight/ ORESTES Jang/ ADRIA Lee / TOÑITO Mora / ROBB Kapoor  ---------------------------------------------------------------------------------------------------------------  seen and examined today for KARLOS  Interval : serum creatinine and BUN rising  VITALS:  T(F): 97.7 (04-29-22 @ 07:00), Max: 98.1 (04-28-22 @ 20:00)  HR: 65 (04-29-22 @ 10:00)  BP: 120/60 (04-29-22 @ 10:00)  RR: 16 (04-29-22 @ 10:00)  SpO2: 94% (04-29-22 @ 10:00)  Wt(kg): --    04-28 @ 07:01  -  04-29 @ 07:00  --------------------------------------------------------  IN: 1592 mL / OUT: 745 mL / NET: 847 mL    04-29 @ 07:01  -  04-29 @ 11:11  --------------------------------------------------------  IN: 62 mL / OUT: 0 mL / NET: 62 mL      Physical Exam :-  Constitutional: NAD  Neck: Supple.  Respiratory: Bilateral equal breath sounds,  Cardiovascular: S1, S2 normal,  Gastrointestinal: Bowel Sounds present, soft, non tender.  Extremities: 2+ edema, right BKA  Neurological: Alert and Oriented  Psychiatric: Normal mood, normal affect  Data:-  Allergies :   No Known Allergies    Hospital Medications:   MEDICATIONS  (STANDING):  acetaminophen     Tablet .. 975 milliGRAM(s) Oral every 6 hours  albuterol/ipratropium for Nebulization 3 milliLiter(s) Nebulizer every 6 hours  atorvastatin 40 milliGRAM(s) Oral at bedtime  buDESOnide    Inhalation Suspension 0.5 milliGRAM(s) Inhalation every 12 hours  finasteride 5 milliGRAM(s) Oral daily  furosemide    Tablet 20 milliGRAM(s) Oral two times a day  guaiFENesin ER 1200 milliGRAM(s) Oral every 12 hours  heparin  Infusion 1550 Unit(s)/Hr (15.5 mL/Hr) IV Continuous <Continuous>  insulin lispro (ADMELOG) corrective regimen sliding scale   SubCutaneous at bedtime  insulin lispro (ADMELOG) corrective regimen sliding scale   SubCutaneous three times a day before meals  levothyroxine 25 MICROGram(s) Oral daily  metoprolol succinate ER 25 milliGRAM(s) Oral daily  montelukast 10 milliGRAM(s) Oral daily  multivitamin/minerals 1 Tablet(s) Oral daily  pantoprazole    Tablet 40 milliGRAM(s) Oral before breakfast  piperacillin/tazobactam IVPB.. 3.375 Gram(s) IV Intermittent every 8 hours  polyethylene glycol 3350 17 Gram(s) Oral daily  senna 2 Tablet(s) Oral at bedtime  tamsulosin 0.8 milliGRAM(s) Oral at bedtime    04-29    141  |  104  |  42<H>  ----------------------------<  232<H>  4.0   |  22  |  1.27    Ca    8.2<L>      29 Apr 2022 00:36  Phos  3.0     04-29  Mg     1.8     04-29    TPro  5.3<L>  /  Alb  2.2<L>  /  TBili  0.5  /  DBili      /  AST  9<L>  /  ALT  14  /  AlkPhos  55  04-27    Creatinine Trend: 1.27 <--, 0.93 <--, 0.98 <--, 0.99 <--, 1.10 <--, 1.24 <--, 1.29 <--, 1.27 <--                        7.5    9.32  )-----------( 105      ( 29 Apr 2022 00:36 )             24.5

## 2022-04-29 NOTE — PROGRESS NOTE ADULT - ASSESSMENT
86M with PMH of COPD (not on home o2), prior smoking history (40 pack years), HTN, HLD, Afib, CHF, T2DM, CVA, BPH, and PAD admitted for elective RLE AKA. Renal consulted for CKD Mx.    KARLOS on CKD 3,   serum k stable  bicarb stable  cont monitor Serum Creatinine   Edema present, on lasix 20 IV BID  off losartan  rising serum creatinine noted  BP decreasing  Advised to hold lasix for now in light of decreasing blood pressure   monitor BMP daily and u/o   dose all meds for eGFR  avoid NSAIDs/Nephrotoxics.    Rt LE nonhealing wound:  follow up with vascular  S/P BKA    Anemia  prbc per primary team      For any question, call:  Cell # [phone]  Pager # [phone]  Callback # [phone]

## 2022-04-30 NOTE — PROGRESS NOTE ADULT - ASSESSMENT
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 Post-op course c/b severe COPD exacerbation requiring respiratory support and continuous monitoring in SICU. After improvement, pt sent to the floor where the patient's pulmonary status was optimized by pulmonology and the RLE BKA was formalized on 4/22. On 4/27, rapid response called for lethargy 2/2 hypoglycemia which improved upon glucose administration. Shortly afterwards, pt with additional episode of lethargy despite normal blood glucose and stable vital signs/labs. Pt taken for CTH, then transferred to SICU for close monitoring.     PLAN:  Neuro: post-operative pain, lethargy 2/2 sepsis 2/2 RLE stump infection?  - Mental status improving. Will continue to monitor mental status  - Pain control as needed with acetaminophen, oxycodone prn q6hrs  - Avoiding opioids and gabapentin iso lethargy  - Q4hrs neuro checks iso lethargy    Resp: hx COPD  - Monitor pulse oximeter  - Continue Duoneb, Pulmicort, montelukast for COPD  - Diuresis with PO furosemide 20mg BID  - Chest PT, robitussin for secretions  - Out of bed to chair, ambulate as tolerated, and incentive spirometry to prevent atelectasis    CV: hx HLD  - Continue metoprolol ER 25mg daily  - Monitor vital signs    GI: no acute issues  - Soft and Bite sized (consistent carb) diet as tolerated  - Bowel regimen with senna & Miralax  - Continue home protonix    Renal: urinary retention  - Monitor I&Os  - Monitor electrolytes and replete as necessary  - Adair replaced 4/27 for retention; continue tamsulosin and finasteride    Heme:  - Monitor CBC and coags  - Heparin infusion for a-fib  - Per cards: post op ok to transition to coumadin or Eliquis AND aspirin    ID: possible RLE stump infection  - Monitor for clinical evidence of active infection  - Empiric zosyn (4/27 - ?)  - MRSA swab negative, vancomycin discontinued (4/27 - 4/28)  - BCx sent 4/28  - UA negative 4/27  - Procal 0.37 on readmission to SICU    Endo: hx DM, hx HLD, hx hypothyroidism  - Home statin  - Hyperglycemic now receiving Lantus 10u qHS, ISS pre meals and ISS HS. Abx now administered with NS.   - Home synthroid    Disposition: Transfer to SICU    Code Status: Full code

## 2022-04-30 NOTE — PROGRESS NOTE ADULT - SUBJECTIVE AND OBJECTIVE BOX
GENERAL SURGERY PROGRESS NOTE   ___________________________________________________________________    LIBRA PEÑA | [id number] | 87y Male | [facility] 8ICU 14 | LOS 29d    Attending: Anmol Quinn    ___________________________________________________________________    CC: Patient is a 87y old  Male who presents with a chief complaint of Right above knee amputation (2022 16:09)      SUBJECTIVE:   Patient seen today during morning rounds at bedside and found to be without acute distress. Denies chest pain, fever, severe pain, or SOB.     Overnight: Unremarkable    Allegies:  NKDA    OBJECTIVE:  Vitals:    T(C): 37 (22 @ 03:00), Max: 37 (22 @ 03:00)  HR: 82 (22 @ 03:00) (63 - 90)  BP: 147/63 (22 @ 03:00) (105/51 - 156/70)  RR: 17 (22 @ 03:00) (13 - 48)  SpO2: 93% (22 @ 03:00) (88% - 99%)      OUT:    Indwelling Catheter - Urethral (mL): 745 mL  Total OUT: 745 mL      OUT:    Indwelling Catheter - Urethral (mL): 1015 mL  Total OUT: 1015 mL        Physical Exam:  Gen: NAD   CV: Regular rate  Resp: Nonlabored breathing with face mask in place  Ext: R BKA stump with operative dressing in place that is c/d/i, no strikethrough noted, stump appears swollen, erythematous and with increase skin discoloration along the stump line, staples are intact with out any purulent or serosanguinous discharge, knee immobilizer in place    Medications:  heparin  Infusion 1550 IV Continuous <Continuous>  piperacillin/tazobactam IVPB.. 3.375 IV Intermittent every 8 hours    acetaminophen     Tablet .. 975 milliGRAM(s) Oral every 6 hours  albuterol/ipratropium for Nebulization 3 milliLiter(s) Nebulizer every 6 hours  buDESOnide    Inhalation Suspension 0.5 milliGRAM(s) Inhalation every 12 hours  furosemide    Tablet 20 milliGRAM(s) Oral two times a day  glycopyrrolate Injectable 0.1 milliGRAM(s) IV Push two times a day  guaiFENesin ER 1200 milliGRAM(s) Oral every 12 hours  metoprolol succinate ER 25 milliGRAM(s) Oral daily  montelukast 10 milliGRAM(s) Oral daily  pantoprazole    Tablet 40 milliGRAM(s) Oral before breakfast  polyethylene glycol 3350 17 Gram(s) Oral daily  senna 2 Tablet(s) Oral at bedtime  tamsulosin 0.8 milliGRAM(s) Oral at bedtime        Laboratory:  WBC: 7.69 H&H: 7.7/24.9 Plt: 96  WBC: 9.32 H&H: 7.5/24.5 Plt: 105    Chemistry:                               Phos: 3.5 M.1  138  |  102  |  38  ----------------------------<  186  3.9   |  22  |  1.10        ,                              Phos: 3.0 M.8  141  |  104  |  42  ----------------------------<  232  4.0   |  22  |  1.27             TPro 5.3<L> / Alb 2.2<L> / TBili 0.5 / DBili x  / AST/AST 9<L>/14 / AlkPhos 55  PTT 71.2 PT/INR 19.9/1.71          Reviewed laboratory and imaging     VASCULAR SURGERY PROGRESS NOTE     CC: Patient is a 87y old  Male who presents with a chief complaint of Right above knee amputation (2022 16:09)      SUBJECTIVE:   Patient seen today during morning rounds at bedside. Complains of mild leg pain. Dressing changed this AM.     Overnight: Unremarkable    Allegies:  NKDA    OBJECTIVE:  Vitals:    T(C): 37 (22 @ 03:00), Max: 37 (22 @ 03:00)  HR: 82 (22 @ 03:00) (63 - 90)  BP: 147/63 (22 @ 03:00) (105/51 - 156/70)  RR: 17 (22 @ 03:00) (13 - 48)  SpO2: 93% (22 @ 03:00) (88% - 99%)      OUT:    Indwelling Catheter - Urethral (mL): 745 mL  Total OUT: 745 mL      OUT:    Indwelling Catheter - Urethral (mL): 1015 mL  Total OUT: 1015 mL        Physical Exam:  Gen: NAD   CV: Regular rate  Resp: Nonlabored breathing with face mask in place  Ext: R BKA stump with operative dressing in place that is c/d/i, no strikethrough noted, stump appears swollen, erythematous and with increase skin discoloration along the stump line, staples are intact with out any purulent or serosanguinous discharge, knee immobilizer in place    Medications:  heparin  Infusion 1550 IV Continuous <Continuous>  piperacillin/tazobactam IVPB.. 3.375 IV Intermittent every 8 hours    acetaminophen     Tablet .. 975 milliGRAM(s) Oral every 6 hours  albuterol/ipratropium for Nebulization 3 milliLiter(s) Nebulizer every 6 hours  buDESOnide    Inhalation Suspension 0.5 milliGRAM(s) Inhalation every 12 hours  furosemide    Tablet 20 milliGRAM(s) Oral two times a day  glycopyrrolate Injectable 0.1 milliGRAM(s) IV Push two times a day  guaiFENesin ER 1200 milliGRAM(s) Oral every 12 hours  metoprolol succinate ER 25 milliGRAM(s) Oral daily  montelukast 10 milliGRAM(s) Oral daily  pantoprazole    Tablet 40 milliGRAM(s) Oral before breakfast  polyethylene glycol 3350 17 Gram(s) Oral daily  senna 2 Tablet(s) Oral at bedtime  tamsulosin 0.8 milliGRAM(s) Oral at bedtime        Laboratory:  WBC: 7.69 H&H: 7.7/24.9 Plt: 96  WBC: 9.32 H&H: 7.5/24.5 Plt: 105    Chemistry:                               Phos: 3.5 M.1  138  |  102  |  38  ----------------------------<  186  3.9   |  22  |  1.10        ,                              Phos: 3.0 M.8  141  |  104  |  42  ----------------------------<  232  4.0   |  22  |  1.27             TPro 5.3<L> / Alb 2.2<L> / TBili 0.5 / DBili x  / AST/AST 9<L>/14 / AlkPhos 55  PTT 71.2 PT/INR 19.9/1.71          Reviewed laboratory and imaging

## 2022-04-30 NOTE — PROGRESS NOTE ADULT - ASSESSMENT
86 year old gentleman with a PMH DM, HTN, HLD who has been followed for the past 6 months for foot wounds and leg pain.      EKG -  A sense V paced PVC's  Echo - Mild LV dysfunction mild aortic stenosis    1) Post-op assessment   -pt has h/o moderate LV dysfunction as per echo 2017, no chest pains 2d echo now shows mild LV dysfunction  -12 lead EKG ok,  PPM interrogated  -s/p BKA     2) HTN  -controlled  -c/w metoprolol  -continue to monitor BP    3) Chronic systolic CHF   - hold metolazone   -c/w PO lasix 20mg BID  -monitor I&Os    4) ?Atrial fib   -c/w  coumadin bridge   -monitor INR     5) KARLOS  -improving  -continue to hold losartan and metolazone  -f/u renal

## 2022-04-30 NOTE — PROGRESS NOTE ADULT - ASSESSMENT
86M PMH HTN, HLD, DM2 and nonhealing wounds of RLE who is non-ambulatory at home now s/p right guillotine BELOW knee amputation. Postoperative course c/b severe COPD exacerbation requiring excalation of O2 supplementation with HFNC. Now s/p R BKA completion 4/22. Received 1U yesterday. Pt has failed numerous TOV. Eventual placement of hudson catheter. On 4/27 he had 2 rapids called on him and was transferred to the SICU for monitoring. He is stable and doing better.    - Daily dressing changes  - Pain control   - Continue hep gtt   - CC Regs  - Hudson in place will be dc with Hudson   - ISS  - Per podiatry re: left heel discoloration- cont with z flow boot in bed at all times, leave open to air  - Per cards: post op ok to transition to coumadin or Eliquis AND aspirin  - Home meds   - PT Re-eval: [facility]  - PM&R eval: agree on [facility]  - Dispo: [facility], family given choices    Vascular Surgery  [id number] 86M PMH HTN, HLD, DM2 and nonhealing wounds of RLE who is non-ambulatory at home now s/p right guillotine BELOW knee amputation. Postoperative course c/b severe COPD exacerbation requiring excalation of O2 supplementation with HFNC. Now s/p R BKA completion 4/22. Received 1U yesterday. Pt has failed numerous TOV. Eventual placement of hudson catheter. On 4/27 he had 2 rapids called on him and was transferred to the SICU for monitoring. He is stable and doing better.    PLAN:  - Daily dressing changes  - Pain control   - Continue hep gtt, Coumadin 7.5 mg today.   - CC Regs  - ISS  - Per podiatry re: left heel discoloration- cont with z flow boot in bed at all times, leave open to air  - Per cards: post op ok to transition to coumadin or Eliquis AND aspirin  - Home meds   - PT Re-eval: [facility]  - PM&R eval: agree on [facility]  - Dispo: [facility], family given choices    Vascular Surgery  [id number]

## 2022-04-30 NOTE — PROGRESS NOTE ADULT - ASSESSMENT
85 yo male ex  smoker, h/o HTN, HLD, DM, CKD, CVA, CHF (mild systolic dysfunction) and atrial fibrillation with sick sinus syndrome s/p pacemaker implantation.  h/o COPD/ZACKARY, TOM colonization/infection, admitted on 4/1 with RLE wet gangrene, s/p R guillotine BKA,  AKA completed on 4/22  POD8 post R AKA, on ZOsyn for stump infection   pain controlled,   podiatry following left heel discoloration, wearing z flow boots in bed  s/p COPD exacerbation. completed Prednisone taper  albuterol/atrovent nebs   pulmicort 0.5mg nebs q12h   mucinex 1200mg 2 times daily  singulair 10mg @ bedtime  acapella device/incentive spirometer  on RA 91-92% pulse Ox  steroid induced hyperglycemia, now resolved and had hypoglycemia  CKD3/s/p KARLOS due to diuresis in the setting of euvolemia   has Adair for urinary retention  ptn transferred to the floor out of SICU, vitals are stable, glucose is stable, remains on dysphagia diet, would probably advance it to dysphgagia 1 diet since ptn failed FEEST. has a high risk of aspiration.   on Abx for poss ASP PNA and/or stump infection ( Zosyn),   on Hep drip being bridged to COumadin  HTN, systolic CHF, on lasix  Afib stable, cardiology following  dispo to [facility]

## 2022-04-30 NOTE — PROGRESS NOTE ADULT - SUBJECTIVE AND OBJECTIVE BOX
HISTORY:  87y M with Hx DM, HTN, COPD, and HLD who presented with RLE pain (s/p angio 9/2021), found to have wet gangrene s/p R laura BKA 4/4. Post-op course c/b severe COPD exacerbation requiring respiratory support and continuous monitoring in SICU. After improvement, pt sent to the floor where the patient's pulmonary status was optimized by pulmonology and the RLE BKA was formalized on 4/22.    On 4/27, rapid response called for lethargy 2/2 hypoglycemia which improved upon glucose administration. Shortly afterwards, pt with additional episode of lethargy despite normal blood glucose and stable vital signs/labs. Pt taken for CTH, then transferred to SICU for close monitoring.      24 HOUR EVENTS:  - Mental status improving  - Per Cards ok to transition to coumadin or Eliquis AND aspirin  - Hyperglycemic-> started on Lantus and Abx now administered with NS    NEURO  Exam: AOx4, following commands  Meds: acetaminophen     Tablet .. 975 milliGRAM(s) Oral every 6 hours  oxyCODONE    IR 5 milliGRAM(s) Oral every 6 hours PRN Severe Pain (7 - 10)      RESPIRATORY  RR: 17 (04-30-22 @ 03:00) (13 - 48)  SpO2: 93% (04-30-22 @ 03:00) (88% - 99%)  Wt(kg): --  Exam: BL clear lungs      Meds: albuterol/ipratropium for Nebulization 3 milliLiter(s) Nebulizer every 6 hours  buDESOnide    Inhalation Suspension 0.5 milliGRAM(s) Inhalation every 12 hours  guaiFENesin ER 1200 milliGRAM(s) Oral every 12 hours  montelukast 10 milliGRAM(s) Oral daily      CARDIOVASCULAR  HR: 82 (04-30-22 @ 03:00) (63 - 90)  BP: 147/63 (04-30-22 @ 03:00) (105/51 - 156/70)  BP(mean): 91 (04-30-22 @ 03:00) (74 - 127)  ABP: --  ABP(mean): --  Wt(kg): --  CVP(cm H2O): --      Exam: RRR. RLE stump erythematous and tender. No drainage from wound, which is intact  Cardiac Rhythm: normal sinus  Perfusion     [x]Adequate   [ ]Inadequate  Mentation   [x]Baseline       [ ]Reduced  Extremities  [x]Warm         [ ]Cool  Volume Status [ ]Hypervolemic [x]Euvolemic [ ]Hypovolemic  Meds: furosemide    Tablet 20 milliGRAM(s) Oral two times a day  metoprolol succinate ER 25 milliGRAM(s) Oral daily  tamsulosin 0.8 milliGRAM(s) Oral at bedtime      GI/NUTRITION  Exam: soft, nontender, nondistended  Diet: Soft and bite-sized, consistent carb  Meds: glycopyrrolate Injectable 0.1 milliGRAM(s) IV Push two times a day  pantoprazole    Tablet 40 milliGRAM(s) Oral before breakfast  polyethylene glycol 3350 17 Gram(s) Oral daily  senna 2 Tablet(s) Oral at bedtime      GENITOURINARY  I&O's Detail    04-28 @ 07:01  -  04-29 @ 07:00  --------------------------------------------------------  IN:    Heparin: 372 mL    IV PiggyBack: 650 mL    Oral Fluid: 570 mL  Total IN: 1592 mL    OUT:    Indwelling Catheter - Urethral (mL): 745 mL  Total OUT: 745 mL    Total NET: 847 mL      04-29 @ 07:01 - 04-30 @ 03:53  --------------------------------------------------------  IN:    Heparin: 294.5 mL    IV PiggyBack: 100 mL  Total IN: 394.5 mL    OUT:    Indwelling Catheter - Urethral (mL): 1015 mL  Total OUT: 1015 mL    Total NET: -620.5 mL          04-30    138  |  102  |  38<H>  ----------------------------<  186<H>  3.9   |  22  |  1.10    Ca    8.3<L>      30 Apr 2022 00:33  Phos  3.5     04-30  Mg     2.1     04-30      Meds: multivitamin/minerals 1 Tablet(s) Oral daily      HEMATOLOGIC  Meds: heparin  Infusion 1550 Unit(s)/Hr IV Continuous <Continuous>                          7.7    7.69  )-----------( 96       ( 30 Apr 2022 00:33 )             24.9     PT/INR - ( 30 Apr 2022 00:33 )   PT: 19.9 sec;   INR: 1.71 ratio         PTT - ( 30 Apr 2022 00:33 )  PTT:71.2 sec    INFECTIOUS DISEASES  T(C): 37 (04-30-22 @ 03:00), Max: 37 (04-30-22 @ 03:00)  Wt(kg): --  WBC Count: 7.69 K/uL (04-30 @ 00:33)    Recent Cultures:  Specimen Source: .Blood Blood-Peripheral, 04-28 @ 09:14; Results   No growth to date.; Gram Stain: --; Organism: --    Meds: piperacillin/tazobactam IVPB.. 3.375 Gram(s) IV Intermittent every 8 hours      ENDOCRINE  Capillary Blood Glucose    Meds: atorvastatin 40 milliGRAM(s) Oral at bedtime  finasteride 5 milliGRAM(s) Oral daily  insulin glargine Injectable (LANTUS) 10 Unit(s) SubCutaneous at bedtime  insulin lispro (ADMELOG) corrective regimen sliding scale   SubCutaneous at bedtime  insulin lispro (ADMELOG) corrective regimen sliding scale   SubCutaneous three times a day before meals  levothyroxine 25 MICROGram(s) Oral daily      ACCESS DEVICES:  [x] Peripheral IV  [ ] Central Venous Line	[ ] R	[ ] L	[ ] IJ	[ ] Fem	[ ] SC	Placed:   [ ] Arterial Line		[ ] R	[ ] L	[ ] Fem	[ ] Rad	[ ] Ax	Placed:   [ ] PICC:					[ ] Mediport  [x] Urinary Catheter, Date Placed: 4/27 for retention  [x] Necessity of urinary, arterial, and venous catheters discussed    OTHER MEDICATIONS:      IMAGING:

## 2022-04-30 NOTE — PROGRESS NOTE ADULT - SUBJECTIVE AND OBJECTIVE BOX
Ritesh Bell MD  Interventional Cardiology / Advance Heart Failure and Cardiac Transplant Specialist  [place] Office : [phone] [place] [address] [place] [address]  Tel:   [place] Office : [address] [facility] [place] [address]  Tel: [phone]       Pt is lying in bed comfortable not in distress, no chest pains some SOB no palpitations  	  MEDICATIONS:  furosemide    Tablet 20 milliGRAM(s) Oral two times a day  heparin  Infusion 1550 Unit(s)/Hr IV Continuous <Continuous>  metoprolol succinate ER 25 milliGRAM(s) Oral daily  tamsulosin 0.8 milliGRAM(s) Oral at bedtime    piperacillin/tazobactam IVPB.. 3.375 Gram(s) IV Intermittent every 8 hours    albuterol/ipratropium for Nebulization 3 milliLiter(s) Nebulizer every 6 hours  buDESOnide    Inhalation Suspension 0.5 milliGRAM(s) Inhalation every 12 hours  guaiFENesin ER 1200 milliGRAM(s) Oral every 12 hours  montelukast 10 milliGRAM(s) Oral daily    acetaminophen     Tablet .. 975 milliGRAM(s) Oral every 6 hours  oxyCODONE    IR 5 milliGRAM(s) Oral every 6 hours PRN    glycopyrrolate Injectable 0.1 milliGRAM(s) IV Push two times a day  pantoprazole    Tablet 40 milliGRAM(s) Oral before breakfast  polyethylene glycol 3350 17 Gram(s) Oral daily  senna 2 Tablet(s) Oral at bedtime    atorvastatin 40 milliGRAM(s) Oral at bedtime  finasteride 5 milliGRAM(s) Oral daily  insulin glargine Injectable (LANTUS) 10 Unit(s) SubCutaneous at bedtime  insulin lispro (ADMELOG) corrective regimen sliding scale   SubCutaneous three times a day before meals  insulin lispro (ADMELOG) corrective regimen sliding scale   SubCutaneous at bedtime    multivitamin/minerals 1 Tablet(s) Oral daily      PAST MEDICAL/SURGICAL HISTORY  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    Deep Vein Thrombosis (DVT)  17 yeras ago on Coumadin    CHF (congestive heart failure)  last exacerbation in 1/2017    Enlarged prostate    GERD (gastroesophageal reflux disease)    Hernia  umblical    Calculus of bile duct without cholangitis or cholecystitis without obstruction    Atrial fibrillation    S/P Hernia Repair    S/P cataract surgery, unspecified laterality    S/P ERCP  3/2017        SOCIAL HISTORY: Substance Use (street drugs): ( x ) never used  (  ) other:    FAMILY HISTORY:      gums       PHYSICAL EXAM:  T(C): 36.9 (04-30-22 @ 21:07), Max: 37.1 (04-30-22 @ 17:42)  HR: 87 (04-30-22 @ 21:07) (70 - 94)  BP: 134/65 (04-30-22 @ 21:07) (131/63 - 165/69)  RR: 18 (04-30-22 @ 21:07) (13 - 28)  SpO2: 95% (04-30-22 @ 21:07) (88% - 96%)  Wt(kg): --  I&O's Summary    29 Apr 2022 07:01  -  30 Apr 2022 07:00  --------------------------------------------------------  IN: 550.5 mL / OUT: 1120 mL / NET: -569.5 mL    30 Apr 2022 07:01  -  30 Apr 2022 22:16  --------------------------------------------------------  IN: 540 mL / OUT: 1050 mL / NET: -510 mL             EYES:   PERRLA   ENMT:   Moist mucous membranes, Good dentition, No lesions  Cardiovascular: Normal S1 S2, No JVD, No murmurs, No edema  Respiratory: b/l rhonchi   Gastrointestinal:  Soft, Non-tender, + BS	  Extremities: no edema                                    7.7    7.69  )-----------( 96       ( 30 Apr 2022 00:33 )             24.9     04-30    138  |  102  |  38<H>  ----------------------------<  186<H>  3.9   |  22  |  1.10    Ca    8.3<L>      30 Apr 2022 00:33  Phos  3.5     04-30  Mg     2.1     04-30      proBNP:   Lipid Profile:   HgA1c:   TSH:     Consultant(s) Notes Reviewed:  [x ] YES  [ ] NO    Care Discussed with Consultants/Other Providers [ x] YES  [ ] NO    Imaging Personally Reviewed independently:  [x] YES  [ ] NO    All labs, radiologic studies, vitals, orders and medications list reviewed. Patient is seen and examined at bedside. Case discussed with medical team.

## 2022-04-30 NOTE — PROGRESS NOTE ADULT - SUBJECTIVE AND OBJECTIVE BOX
[facility] : [facility] [phone], Office [phone]  Dr Melendez/Dr Wall  /Dr Alex butt /Dr SHAHANA Knight/Dr Joshua Jang/Dr Jeremi Lee /Dr KENDALL Mora  _______________________________________________________________________________________________    seen and examined today for renal function  Interval : Serum Creatinine stable  VITALS:  T(F): 97.8 (04-30-22 @ 09:10), Max: 98.6 (04-30-22 @ 03:00)  HR: 70 (04-30-22 @ 09:10)  BP: 148/70 (04-30-22 @ 09:10)  RR: 20 (04-30-22 @ 09:10)  SpO2: 93% (04-30-22 @ 09:10)    04-29 @ 07:01  -  04-30 @ 07:00  --------------------------------------------------------  IN: 550.5 mL / OUT: 1120 mL / NET: -569.5 mL    Physical Exam :-  Constitutional: NAD  Respiratory: Bilateral equal breath sounds, no Crackles present.  Cardiovascular: S1, S2 normal, positive Murmur  Gastrointestinal: Bowel Sounds present, soft, non tender.  Extremities: + Edema Feet  Data:-  Allergies :   No Known Allergies    Hospital Medications:   MEDICATIONS  (STANDING):  acetaminophen     Tablet .. 975 milliGRAM(s) Oral every 6 hours  albuterol/ipratropium for Nebulization 3 milliLiter(s) Nebulizer every 6 hours  atorvastatin 40 milliGRAM(s) Oral at bedtime  buDESOnide    Inhalation Suspension 0.5 milliGRAM(s) Inhalation every 12 hours  finasteride 5 milliGRAM(s) Oral daily  furosemide    Tablet 20 milliGRAM(s) Oral two times a day  glycopyrrolate Injectable 0.1 milliGRAM(s) IV Push two times a day  guaiFENesin ER 1200 milliGRAM(s) Oral every 12 hours  heparin  Infusion 1550 Unit(s)/Hr (15.5 mL/Hr) IV Continuous <Continuous>  insulin glargine Injectable (LANTUS) 10 Unit(s) SubCutaneous at bedtime  insulin lispro (ADMELOG) corrective regimen sliding scale   SubCutaneous at bedtime  insulin lispro (ADMELOG) corrective regimen sliding scale   SubCutaneous three times a day before meals  levothyroxine 25 MICROGram(s) Oral daily  metoprolol succinate ER 25 milliGRAM(s) Oral daily  montelukast 10 milliGRAM(s) Oral daily  multivitamin/minerals 1 Tablet(s) Oral daily  pantoprazole    Tablet 40 milliGRAM(s) Oral before breakfast  piperacillin/tazobactam IVPB.. 3.375 Gram(s) IV Intermittent every 8 hours  polyethylene glycol 3350 17 Gram(s) Oral daily  senna 2 Tablet(s) Oral at bedtime  tamsulosin 0.8 milliGRAM(s) Oral at bedtime  warfarin 7.5 milliGRAM(s) Oral once    04-30    138  |  102  |  38<H>  ----------------------------<  186<H>  3.9   |  22  |  1.10    Ca    8.3<L>      30 Apr 2022 00:33  Phos  3.5     04-30  Mg     2.1     04-30      Creatinine Trend: 1.10 <--, 1.27 <--, 0.93 <--, 0.98 <--, 0.99 <--, 1.10 <--, 1.24 <--, 1.29 <--                        7.7    7.69  )-----------( 96       ( 30 Apr 2022 00:33 )             24.9

## 2022-04-30 NOTE — PROGRESS NOTE ADULT - SUBJECTIVE AND OBJECTIVE BOX
DIABETES FOLLOW UP : Seen earlier today    INTERVAL HX: d/w pca & RN pt tolerated about 60% of breakfast, pt reports he is feeling ok and eating good . -242 since dinner on scale/basal only . c/o right leg pain Rn notified , does not remember last time he had bowel movement       Review of Systems:  General: As above  Cardiovascular: No chest pain  Respiratory: No SOB  GI: No nausea, vomiting  Endocrine: no  S&Sx of hypoglycemia    Allergies    No Known Allergies    Intolerances      MEDICATIONS  (STANDING):  acetaminophen     Tablet .. 975 milliGRAM(s) Oral every 6 hours  albuterol/ipratropium for Nebulization 3 milliLiter(s) Nebulizer every 6 hours  atorvastatin 40 milliGRAM(s) Oral at bedtime  buDESOnide    Inhalation Suspension 0.5 milliGRAM(s) Inhalation every 12 hours  finasteride 5 milliGRAM(s) Oral daily  furosemide    Tablet 20 milliGRAM(s) Oral two times a day  glycopyrrolate Injectable 0.1 milliGRAM(s) IV Push two times a day  guaiFENesin ER 1200 milliGRAM(s) Oral every 12 hours  heparin  Infusion 1550 Unit(s)/Hr (15.5 mL/Hr) IV Continuous <Continuous>  insulin glargine Injectable (LANTUS) 10 Unit(s) SubCutaneous at bedtime  insulin lispro (ADMELOG) corrective regimen sliding scale   SubCutaneous at bedtime  insulin lispro (ADMELOG) corrective regimen sliding scale   SubCutaneous three times a day before meals  levothyroxine 25 MICROGram(s) Oral daily  metoprolol succinate ER 25 milliGRAM(s) Oral daily  montelukast 10 milliGRAM(s) Oral daily  multivitamin/minerals 1 Tablet(s) Oral daily  pantoprazole    Tablet 40 milliGRAM(s) Oral before breakfast  piperacillin/tazobactam IVPB.. 3.375 Gram(s) IV Intermittent every 8 hours  polyethylene glycol 3350 17 Gram(s) Oral daily  senna 2 Tablet(s) Oral at bedtime  tamsulosin 0.8 milliGRAM(s) Oral at bedtime  warfarin 7.5 milliGRAM(s) Oral once      PHYSICAL EXAM:  VITALS: T(C): 36.6 (04-30-22 @ 13:01)  T(F): 97.9 (04-30-22 @ 13:01), Max: 98.6 (04-30-22 @ 03:00)  HR: 75 (04-30-22 @ 13:01) (65 - 94)  BP: 165/69 (04-30-22 @ 13:01) (108/51 - 165/69)  RR:  (13 - 38)  SpO2:  (88% - 97%)  Wt(kg): --  GENERAL: male laying in bed  in NAD  Abdomen: distended  Extremities: Warm, R bka  NEURO: alert appropriate    LABS:  POCT Blood Glucose.: 157 mg/dL (04-30-22 @ 09:20)  POCT Blood Glucose.: 242 mg/dL (04-29-22 @ 21:42)  POCT Blood Glucose.: 133 mg/dL (04-29-22 @ 17:40)  POCT Blood Glucose.: 230 mg/dL (04-29-22 @ 11:54)  POCT Blood Glucose.: 230 mg/dL (04-28-22 @ 21:53)  POCT Blood Glucose.: 193 mg/dL (04-28-22 @ 17:36)  POCT Blood Glucose.: 166 mg/dL (04-28-22 @ 12:22)  POCT Blood Glucose.: 193 mg/dL (04-28-22 @ 08:28)  POCT Blood Glucose.: 133 mg/dL (04-27-22 @ 23:26)  POCT Blood Glucose.: 123 mg/dL (04-27-22 @ 21:17)  POCT Blood Glucose.: 108 mg/dL (04-27-22 @ 19:30)  POCT Blood Glucose.: 144 mg/dL (04-27-22 @ 18:39)  POCT Blood Glucose.: 217 mg/dL (04-27-22 @ 17:58)  POCT Blood Glucose.: 53 mg/dL (04-27-22 @ 17:43)  POCT Blood Glucose.: 54 mg/dL (04-27-22 @ 17:42)  POCT Blood Glucose.: 160 mg/dL (04-27-22 @ 13:50)                            7.7    7.69  )-----------( 96       ( 30 Apr 2022 00:33 )             24.9       04-30    138  |  102  |  38<H>  ----------------------------<  186<H>  3.9   |  22  |  1.10    Ca    8.3<L>      30 Apr 2022 00:33  Phos  3.5     04-30  Mg     2.1     04-30        eGFR: 65 mL/min/1.73m2 (30 Apr 2022 00:33)        A1C with Estimated Average Glucose Result: 6.8 % (04-02-22 @ 12:21)      Estimated Average Glucose: 148 mg/dL (04-02-22 @ 12:21)

## 2022-04-30 NOTE — PROGRESS NOTE ADULT - NS ATTEND AMEND GEN_ALL_CORE FT
I reviewed the overnight course of events on the unit, re-confirming the patient history. I discussed the care with the patient and their family. The plan of care was discussed with the ACP team and modifications were made to the notation where appropriate. Differential diagnosis and plan of care discussed with patient after the evaluation. Advanced care planning was discussed with patient and family.  Advanced care planning forms were reviewed and discussed.  Risks, benefits and alternatives of cardiac procedures were discussed in detail and all questions were answered. 35 minutes spent on total encounter of which more than fifty percent of the encounter was spent counseling and/or coordinating care by the attending physician.
Patient seen and examined and agree with above.     Current management includes:  Neuro-improved neurologic status   AAO x2-3  Oxycodone PRN added overnight and controlled at this time.   CV- Hemodynamically stable   Pulm - room air with SpO2 95%  COPD- continue duonebs  -goal SpO2 92%  GI-continue consistent carb diet with thick liquids   -last BM 04/28  ID- continue zosyn   WBC 9  - euvolemic  -slight increase in creatinine  Endocrine - BG > 200  Currently on heparin drip and therapeutic - will restart coumadin.   Out of bed to chair.
I reviewed the overnight course of events on the unit, re-confirming the patient history. I discussed the care with the patient and their family. The plan of care was discussed with the ACP team and modifications were made to the notation where appropriate. Differential diagnosis and plan of care discussed with patient after the evaluation. Advanced care planning was discussed with patient and family.  Advanced care planning forms were reviewed and discussed.  Risks, benefits and alternatives of cardiac procedures were discussed in detail and all questions were answered. 35 minutes spent on total encounter of which more than fifty percent of the encounter was spent counseling and/or coordinating care by the attending physician.

## 2022-04-30 NOTE — PROGRESS NOTE ADULT - ASSESSMENT
ASSESSMENT:    86 year old gentleman, former smoker, followed by Dr. Alicja Rob of our practice for asthma/COPD overlap  syndrome and obstructive sleep apnea being treated conservatively. He has no history of TOM colonization/infection as listed in the "past medical history". He has been stable from a pulmonary perspective and maintained on budesonide and duoneb once daily and if needed. He also has a history of HTN, HLD, DM, CKD, CVA, CHF (mild systolic dysfunction) and atrial fibrillation with sick sinus syndrome s/p pacemaker implantation. He has been followed for many months for chronic foot wounds and leg pain now with some purulence and gangrene. The pain is exacerbated when laying in bed and improves with tylenol and when hanging the legs off of the bed. He is no longer able to ambulate. 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 The patient has developed marked shortness of breath with severe hypoxemia currently requiring a nasal canula @ 5lpm to maintain saturation @ 90%. He has a cough with copious mucous in his chest which he is unable to expectorate. He has chest congestion and wheeze. No fevers, chills or sweats. No chest pain/pressure or palpitations. Called by the patient's family and asked to be involved with his pulmonary care.    acute hypoxic respiratory failure    1) severe COPD exacerbation -> slowly improving but exacerbated by ongoing aspiration  2) 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 and respiratory muscle weakness limiting diaphragmatic excursion and chest wall expansion -> bibasilar atelectasis has improved on repeat CXR  3) no evidence of pulmonary edema or pneumonia  4) 4/28 -> possible aspiration pneumonia    leukocytosis -> resolved    steroid induced hyperglycemia    CKD/KARLOS due to diuresis in the setting of euvolemia -> improved    4/14 - remains in the ICU; continues on an insulin infusion for steroid induced hyperglycemia; worsening of his mental status and chronic left sided weakness and left facial droop after analgesics prior to the VAC change yesterday; CT scan and EEG are unremarkable; started on zosyn for possible "sepsis"; now awake and alert sitting in the chair and back to his baseline mental status; no shortness of breath or hypoxemia on room air; occasional cough productive of scant sputum; minimal chest congestion and wheeze; no fevers, chills or sweats; no chest pain/pressure or palpitations; CXR now has bibasilar atelectasis - there is no evidence of pulmonary edema; on heparin gtt for PAD    4/15 - transferred to the surgical floor; mental status is back to baseline; left facial droop and left sided weakness are no worse than usual; continues on zosyn for possible "sepsis"; awake and alert sitting in the chair; no shortness of breath or hypoxemia on room air; occasional cough productive of scant sputum; minimal chest congestion and wheeze; no fevers, chills or sweats; no chest pain/pressure or palpitations; CXR is with a small left pleural effusion and bibasilar atelectasis - there is no evidence of pulmonary edema; on heparin gtt for PAD    4/20 -  left arm swelling ->no evidence of DVT on Duplex; FEEST revealed severe dysphagia -> non oral nutrition recommended -> the family has elected to continue oral feeding understanding the risks of aspiration; mental status is back to baseline; left facial droop and left sided weakness are no worse than usual; continues on zosyn for possible "sepsis"; awake and alert sitting in the chair; no shortness of breath or hypoxemia on room air; occasional cough productive of scant sputum; mild chest congestion and wheeze especially after eating; no fevers, chills or sweats; no chest pain/pressure or palpitations; CXR reveals improved bibasilar atelectasis - there is no evidence of pulmonary edema; on heparin gtt for PAD    4/22 - NPO and off heparin gtt awaiting AKA; awake and alert sitting up in bed; no shortness of breath or hypoxemia on room air; occasional cough productive of scant sputum; chest congestion and wheeze iis much improved and exacerbated when eating; no fevers, chills or sweats; no chest pain/pressure or palpitations; decreased left arm swelling ->no evidence of DVT on Duplex    4/25 - 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; seems sedated on an increased dose of gabapentin and "as needed" ultram for ongoing leg pain; remains on a heparin gtt now being bridged to coumadin; no shortness of breath or hypoxemia on a 2lpm nasal canula; occasional weak cough productive of scant sputum; chest congestion and wheeze is much improved and exacerbated when eating;    4/28 -  confusion yesterday initially due to hypoglycemia off steroids -> improved after glucose -> insulin adjusted; a second episode of altered mental followed with normal glucose -> started on antibiotics due to concern of a stump infection and transferred to the ICU for observation; now more awake and alert but not back to baseline mental status sitting in the chair; 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 -> significant right leg pain continues now off narcotics and gabapentin due to confusion; no shortness of breath or hypoxemia on a 2lpm nasal canula; occasional weak cough productive of scant sputum; persistent chest congestion and wheeze exacerbated when eating; no fevers, chills or sweats; no chest pain/pressure or palpitations; FEEST revealed severe dysphagia -> non oral nutrition recommended -> the family has elected to continue oral feeding understanding the risks of aspiration (small bites and easy to chew diet with moderately thickened fluids ordered); left facial droop and left sided weakness are at baseline); received 1 unit PRBCs 4/26 for post-operative anemia; hudson remains in place due to urinary retention      PLAN/RECOMMENDATIONS:    stable oxygenation on room air  has completed a steroid taper  albuterol/atrovent nebs q6h  pulmicort 0.5mg nebs q12h - give after duoneb - rinse mouth after use  mucinex 1200mg 2 times daily  singulair 10mg @ bedtime  trial of glycopyrrolate 0.1mg IVP q12h x 4 doses - watch for tachycardia, extreme dryness, urinary retention, etc  acapella device/incentive spirometer  zosyn for possible aspiration pneumonia and/or right BKD stump infection - blood cultures are negative - MRSA nasal swab is negative - vancomycin discontinue  speech and swallow evaluation noted - severe dysphagia - NPO with non-oral feeding recommended - I explained the ongoing risk of aspiration with possible pneumonia, worsening bronchospasm, hypoxemia, respiratory failure etc to the patient and family - they do not want placement of a NGT or PEG and wish to continue an oral diet - written for a bite sized and easy to chew diet with moderately thickened fluids - small bites - no straws - chin tuck maneuver explained and demonstrated  cardiac meds: lipitor/toprol XL/lasix - still holding cozaar and metolazone  flomax/proscar -> hudson replaced due to urinary retention  EEG -> mild to moderate nonspecific diffuse or multifocal cerebral dysfunction -  no epileptiform patterns or seizures recorded.  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, stable in appearance compared with prior dated 9/8/2019  CXR 4/18 - improving bilateral lower lobe opacities (atelectasis)  vascular surgery follow-up    heparin gtt bridging to coumadin    pain control - tylenol - oxycodone as needed    s/p completion BKA    wound care  GI prophylaxis - protonix  bowel regimen  glucose control  out of bed and into the chair  LUE Duplex is without DVT    Will follow with you. Plan of care discussed with the patient and his family at bedside and with Dr. Spaulding. Discharge planning.    Sarabjit Wright MD, [facility]  [phone]  Pulmonary Medicine

## 2022-04-30 NOTE — PROGRESS NOTE ADULT - SUBJECTIVE AND OBJECTIVE BOX
[facility] - PROGRESS NOTE    INTERVAL HISTORY: transferred from the surgical ICU to the surgical floor; up in bed; much more awake and alert with minimal confusion; continues on antibiotics due to concern of a stump infection and aspiration pneumonia; s/p completion right BKA  -> right leg pain is improved; no shortness of breath or hypoxemia on room air; occasional weak cough productive of scant sputum; persistent chest congestion and wheeze exacerbated when eating; no fevers, chills or sweats; no chest pain/pressure or palpitations; FEEST revealed severe dysphagia -> non oral nutrition recommended -> the family has elected to continue oral feeding understanding the risks of aspiration (small bites and easy to chew diet with moderately thickened fluids ordered); left facial droop and left sided weakness are at baseline); hudson remains in place due to urinary retention      REVIEW OF SYSTEMS:  Constitutional: As per interval history  HEENT: Within normal limits  CV: As per interval history  Resp: As per interval history  GI: dysphagia  : Within normal limits  Musculoskeletal: Within normal limits  Skin: Within normal limits  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 - left facial droop - dysphagia - confusion  Psychiatric: Within normal limits  Endocrine: diabetes -> hyperglycemia -> symptomatic hypoglycemia  Hematologic/Lymphatic: Within normal limits  Allergic/Immunologic: Within normal limits      MEDICATIONS:     Pulmonary "  albuterol/ipratropium for Nebulization 3 milliLiter(s) Nebulizer every 6 hours  buDESOnide    Inhalation Suspension 0.5 milliGRAM(s) Inhalation every 12 hours  guaiFENesin ER 1200 milliGRAM(s) Oral every 12 hours  montelukast 10 milliGRAM(s) Oral daily    Anti-microbials:  piperacillin/tazobactam IVPB.. 3.375 Gram(s) IV Intermittent every 8 hours    Cardiovascular:  furosemide    Tablet 20 milliGRAM(s) Oral two times a day  metoprolol succinate ER 25 milliGRAM(s) Oral daily  tamsulosin 0.8 milliGRAM(s) Oral at bedtime    Other:  acetaminophen     Tablet .. 975 milliGRAM(s) Oral every 6 hours  atorvastatin 40 milliGRAM(s) Oral at bedtime  finasteride 5 milliGRAM(s) Oral daily  glycopyrrolate Injectable 0.1 milliGRAM(s) IV Push two times a day  heparin  Infusion 1550 Unit(s)/Hr IV Continuous <Continuous>  insulin glargine Injectable (LANTUS) 10 Unit(s) SubCutaneous at bedtime  insulin lispro (ADMELOG) corrective regimen sliding scale   SubCutaneous three times a day before meals  insulin lispro (ADMELOG) corrective regimen sliding scale   SubCutaneous at bedtime  levothyroxine 25 MICROGram(s) Oral daily  multivitamin/minerals 1 Tablet(s) Oral daily  pantoprazole    Tablet 40 milliGRAM(s) Oral before breakfast  polyethylene glycol 3350 17 Gram(s) Oral daily  senna 2 Tablet(s) Oral at bedtime    MEDICATIONS  (PRN):  oxyCODONE    IR 5 milliGRAM(s) Oral every 6 hours PRN Severe Pain (7 - 10)        OBJECTIVE:    POCT Blood Glucose.: 157 mg/dL (2022 09:20)  POCT Blood Glucose.: 242 mg/dL (2022 21:42)  POCT Blood Glucose.: 133 mg/dL (2022 17:40)  POCT Blood Glucose.: 230 mg/dL (2022 11:54)    PHYSICAL EXAM:       ICU Vital Signs Last 24 Hrs  T(C): 36.6 (2022 09:10), Max: 37 (2022 03:00)  T(F): 97.8 (2022 09:10), Max: 98.6 (2022 03:00)  HR: 70 (2022 09:10) (63 - 94)  BP: 148/70 (2022 09:10) (108/51 - 159/76)  BP(mean): 94 (2022 05:00) (74 - 127)  ABP: --  ABP(mean): --  RR: 20 (2022 09:10) (13 - 38)  SpO2: 93% (2022 09:10) (88% - 99%) on room air     General: Awake. Alert. Much less confused. Cooperative. No distress. Appears stated age. Obese.   HEENT: Atraumatic. Normocephalic. Anicteric. Normal oral mucosa. PERRL. EOMI.  Neck: Supple. Trachea midline. Thyroid without enlargement/tenderness/nodules. No carotid bruit. No JVD.	  Cardiovascular: Regular rate and rhythm. Distant S1 S2. No murmurs, rubs or gallops.  Respiratory: Respirations unlabored. Bilateral rhonchi and wheeze. No curvature.  Abdomen: Soft. Non-tender. Non-distended. No organomegaly. No masses. Normal bowel sounds.  Extremities: R BKA stump with operative dressing in place that is c/d/i - no strikethrough noted - stump appears swollen, erythematous and discolored along the stump line - staples are intact with out any purulent or serosanguinous discharge - knee immobilizer in place. Decreased swelling and pitting edema of the left upper extremity  Pulses: Decreased peripheral pulses LLE  Skin: Venous stasis changes left lower extremity  Lymph Nodes: Cervical, supraclavicular and axillary nodes normal  Neurological: Left sided weakness left > arm. Left facial droop. A and O x 1  Psychiatry: Appropriate mood and affect.    LABS:                          7.7    7.69  )-----------( 96       ( 2022 00:33 )             24.9     CBC    WBC  7.69 <==, 9.32 <==, 9.29 <==, 8.42 <==, 10.71 <==, 11.82 <==, 15.28 <==    Hemoglobin  7.7 <<==, 7.5 <<==, 8.5 <<==, 7.8 <<==, 8.2 <<==, 6.9 <<==, 7.3 <<==    Hematocrit  24.9 <==, 24.5 <==, 28.7 <==, 26.4 <==, 26.3 <==, 23.5 <==, 24.7 <==    Platelets  96 <==, 105 <==, 118 <==, 104 <==, 127 <==, 152 <==, 180 <==      138  |  102  |  38<H>  ----------------------------<  186<H>    04-30  3.9   |  22  |  1.10      LYTES    sodium  138 <==, 141 <==, 145 <==, 144 <==, 142 <==, 144 <==, 145 <==    potassium   3.9 <==, 4.0 <==, 3.7 <==, 3.7 <==, 3.9 <==, 3.9 <==, 4.0 <==    chloride  102 <==, 104 <==, 106 <==, 106 <==, 105 <==, 105 <==, 106 <==    carbon dioxide  22 <==, 22 <==, 26 <==, 26 <==, 26 <==, 26 <==, 26 <==    =============================================================================================  RENAL FUNCTION:    Creatinine:   1.10  <<==, 1.27  <<==, 0.93  <<==, 0.98  <<==, 0.99  <<==, 1.10  <<==, 1.24  <<==    BUN:   38 <==, 42 <==, 34 <==, 39 <==, 39 <==, 45 <==, 53 <==    ============================================================================================    calcium   8.3 <==, 8.2 <==, 8.9 <==, 8.4 <==, 8.5 <==, 8.5 <==, 8.4 <==    phos   3.5 <==, 3.0 <==, 3.7 <==, 3.6 <==, 2.8 <==, 3.0 <==, 3.1 <==    mag   2.1 <==, 1.8 <==, 2.0 <==, 2.0 <==, 2.0 <==, 2.0 <==, 2.0 <==    ============================================================================================  LFTs    AST:   9 <==     ALT:  14  <==     AP:  55  <=    Bili:  0.5  <=    PT/INR - ( 2022 00:33 )   PT: 19.9 sec;   INR: 1.71 ratio       PTT - ( 2022 00:33 )  PTT: 71.2 sec    Venous Blood Gas:   @ 03:32  7.39/52/38/32/61.8  VBG Lactate: 1.0    ABG - ( 2022 19:45 )  pH: 7.48  /  pCO2: 41    /  pO2: 106   / HCO3: 30    / Base Excess: 6.4   /  SaO2: 99.3      Venous Blood Gas:   @ 22:10  7.40/41/52/25/84.4  VBG Lactate: 1.8    Venous Blood Gas:   @ 22:23  7.40/39/45/24/78.1  VBG Lactate: 2.4    Venous Blood Gas:   @ 22:23  7.40/39/45/24/78.1  VBG Lactate: 2.4    Venous Blood Gas:  04-10 @ 23:19  7.39/42/44/25/72.2  VBG Lactate: 2.5    Procalcitonin, Serum: 0.33 ng/mL ( @ 03:35)    CARDIAC MARKERS ( 2022 20:08 )  CPK x     /CKMB x     /CKMB Units 2.9 ng/mL    troponin x        CARDIAC MARKERS ( 2022 18:27 )  CPK x     /CKMB x     /CKMB Units x        troponin 204 ng/L    < from: TTE with Doppler (w/Cont) (22 @ 15:07) >    Patient name: Martir Heart  YOB: 1935   Age: 86 (M)   MR#: [id number]  Study Date: 4/3/2022  Location: [address] [place], [place] [id number]: [facility]  Study quality: Technically difficult  Referring Physician: Anmol Quinn MD  BloodPressure: 115/70 mmHg  Height: 173 cm  Weight: 92 kg  BSA: 2.1 m2  ------------------------------------------------------------------------  PROCEDURE: Transthoracic echocardiogram with 2-D, M-Mode  and complete spectral and color flow Doppler. Verbal  consent was obtained for injection of  Ultrasonic Enhancing  Agent following a discussion of risks and benefits.  Following intravenous injection of Ultrasonic Enhancing  Agent, harmonic imaging was performed.  INDICATION: Cardiomyopathy, unspecified (I42.9)  ------------------------------------------------------------------------  Dimensions:    Normal Values:  LA:     3.1    2.0 - 4.0 cm  Ao:     3.5    2.0 - 3.8 cm  SEPTUM: 1.1    0.6 - 1.2 cm  PWT:    1.3    0.6 - 1.1 cm  LVIDd:  4.3    3.0- 5.6 cm  LVIDs:  3.2    1.8 - 4.0 cm  Derived variables:  LVMI: 90 g/m2  RWT: 0.60  Fractional short: 26 %  EF (Visual Estimate): NWV %  Doppler Peak Velocity (m/sec): MV=1.6 AoV=1.4  ------------------------------------------------------------------------  Conclusions:  1. Aortic valve not well visualized; appears calcified.  Peak transaortic valve gradient equals 8 mm Hg, mean  transaortic valve gradient equals 3 mm Hg, estimated aortic  valve area equals 2 sqcm (by continuity equation), aortic  valve velocity time integral equals 22 cm, consistent with  mild aortic stenosis.  2. Endocardial visualization enhanced with intravenous  injection of Ultrasonic Enhancing Agent (Definity). Mild  left ventricular systolic dysfunction. The inferior wall,  and the inferoseptum are hypokinetic.  3. The right ventricle is not well visualized; grossly  normal right ventricular systolic function.  ------------------------------------------------------------------------  Confirmed on  4/3/2022 - 17:12:14 by BRITTANY Giraldo  ------------------------------------------------------------------------  -----------------------------------------------------------  MICROBIOLOGY:     COVID-19 PCR . (22 @ 18:23)   COVID-19 PCR: NotDetec:     Urinalysis Basic - ( 2022 02:47 )    Color: Yellow / Appearance: Clear / S.021 / pH: x  Gluc: x / Ketone: Negative  / Bili: Negative / Urobili: Negative   Blood: x / Protein: 30 mg/dL / Nitrite: Negative   Leuk Esterase: Negative / RBC: 12 /hpf / WBC 2 /HPF   Sq Epi: x / Non Sq Epi: 0 /hpf / Bacteria: Negative    Culture - Blood (22 @ 09:14)   Specimen Source: .Blood Blood-Peripheral   Culture Results:   No growth to date.     Culture - Blood (22 @ 09:14)   Specimen Source: .Blood Blood-Peripheral   Culture Results:   No growth to date.     RADIOLOGY:  [x] Chest radiographs reviewed and interpreted by me    EXAM:  XR CHEST PORTABLE URGENT 1V                          PROCEDURE DATE:  2022      FINDINGS:    The cardiomediastinal silhouette is not well evaluated in this   projection. Thoracic aortic calcifications. 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  New patchy right basilar opacities.  Worsening patchy left perihilar opacities, most pronounced in the mid to   lower lung.  No right pleural effusion.  There is continued left basilar and retrocardiac opacity.  No pneumothorax.  No acute bony abnormality.    IMPRESSION:  New patchy right basilar opacities which could be due to subsegmental   atelectasis or pneumonia.    Worsening patchy left perihilar opacities, most pronounced in the mid to   lower lung, possibly asymmetric pulmonary edema, atelectasis, and/or   pneumonia.    Continued left basilar and retrocardiac opacity which may be due to a   left pleural effusion with passive atelectasis, atelectasis of other   cause, and/or pneumonia.    DUSTY ZUÑIGA DO;Resident Radiologist  This document has been electronically signed.  IGLESIA DEL ROSARIO MD; Attending Radiologist  This document has been electronically signed. 2022  1:32PM  ---------------------------------------------------------------------------------------------------------------  EXAM:  CT BRAIN                          PROCEDURE DATE:  2022      FINDINGS:    Redemonstration of right cerebellar hemisphere, right greater than left   occipital, and high left posterior frontal chronic infarcts    Similar low density left lateral convexity subdural collection measuring   1 cm in greatest depth.    No midline shift or effacement of basal cisterns. No hydrocephalus.    No acute intracranial hemorrhage or brain edema. Moderate white matter   microvascular ischemic disease.    IMPRESSION:    No hydrocephalus, acute intracranial hemorrhage, mass effect, or brain   edema.    Similar low density left lateral convexity subdural collection measuring   1 cm in greatest depth.    No midline shift or effacement of basal cisterns. No hydrocephalus.    Chronic right cerebellar hemisphere, right greater than left occipital,   and high left posterior frontal infarcts.    KENA CASAS MD; Attending Radiologist  This document has been electronically signed. 2022  9:00AM  ---------------------------------------------------------------------------------------------------------------  EXAM:  DUPLEX EXT VEINS UPPER LT                          PROCEDURE DATE:  2022      IMPRESSION:  No evidence of left upper extremity deep venous thrombosis.    NARCISO MUÑOZ MD; Attending Radiologist  This document has been electronically signed. 2022  8:48PM  ---------------------------------------------------------------------------------------------------------------  EXAM:  XR CHEST PORTABLE URGENT 1V                          PROCEDURE DATE:  2022      FINDINGS:  Left chest wall pacemaker.  The heart size cannot be accurately evaluated on this projection.  Bilateral lower lung hazy opacities, reduced since 2022.  There is no pneumothorax.    IMPRESSION:  Partial clearing of the bases and left effusion.    ANITA INFANTE MD; Resident Radiologist  This document has been electronically signed.  HORACIO HELMS MD; Attending Interventional Radiologist  Thisdocument has been electronically signed. 2022  3:57PM  --------------------------------------------------------------------------------------------------------------

## 2022-04-30 NOTE — PROGRESS NOTE ADULT - NS ATTEND OPT1 GEN_ALL_CORE
I attest my time as attending is greater than 50% of the total combined time spent on qualifying patient care activities by the PA/NP and attending.

## 2022-04-30 NOTE — PROGRESS NOTE ADULT - ASSESSMENT
86M with PMH of COPD (not on home o2), prior smoking history (40 pack years), HTN, HLD, Afib, CHF, T2DM, CVA, BPH, and PAD admitted for elective RLE AKA. Renal consulted for CKD Mx.    KARLOS on CKD 3,   serum k stable  bicarb stable    Plan  cont monitor Serum Creatinine   Edema present, on lasix 20 IV BID  off losartan  Advised to hold lasix for now in light of decreasing blood pressure   monitor BMP daily and u/o   dose all meds for eGFR  avoid NSAIDs/Nephrotoxics.    Rt LE nonhealing wound:  follow up with vascular  S/P MAXINEA

## 2022-04-30 NOTE — PROGRESS NOTE ADULT - ASSESSMENT
85 y/o M w/ uncontrolled Type 2 DM complicated by neuropathy and nephropathy with hyperglycemia exacerbated by steroids> off steroids now. Also HTN, HLD, hypothyroidism admitted for R BKA completion 4/22. On 4/27 he had 2 rapids called on him and was transferred to the SICU for monitoring. He is now stable and doing better and back on floors. Tolerating POs but remains with poor PO intake.  Would  c/w present regimen for now since based on pt's age and comorbidities his BG goal should be 100s to low 200s.        Problem/Plan - 1:  ·  Problem: Uncontrolled type 2 diabetes mellitus with hyperglycemia.   ·  Plan: -test BG AC/HS  -c/w Admelog low dose correction scale AC and HS scale  -c/w lantus 10 units sq qhs   Discharge:   Will depend on PO intake/bg levels and insulin needs. Likely going to rehab  Outpt. endo follow-up  Outpt. optho, podiatry, nephrology.     Problem/Plan - 2:  ·  Problem: Essential hypertension.   ·  Plan: Goal BP <140/90, on metoprolol.  Manage per primary team.     Problem/Plan - 3:  ·  Problem: Hyperlipidemia.   ·  Plan: C/w atorvastatin 40 milliGRAM(s) Oral at bedtime  -F/u levels as out pt.     Problem/Plan - 4:  ·  Problem: Hypothyroidism.   ·  Plan: -C/w levothyroxine 25 MICROGram(s) Oral daily    Repeat TFTs as outpatient  -Pt still receiving synthroid at same time as protonix & other medications which alters synthroid absorption - please ensure pt is receiving synthroid 1 hour prior to all other medications on an empty stomach       Discussed with patient and primary  team Vascular regarding retiming synthroid and consider laxative if appropriate since abd is distended, vascular to assess   Contact via [employer] Teams during business hours  On evenings and weekends, please call [phone] or page endocrine fellow on call.   Please note that this patient may be followed by different provider tomorrow.    Time spent on encounter: 28  minutes spent on total encounter; The necessity of the time spent during the encounter on this date of service was due to development of plan of care/coordination of care/glycemic control through review of labs, blood glucose values and vital signs.

## 2022-05-01 NOTE — PROGRESS NOTE ADULT - ASSESSMENT
ASSESSMENT:    86 year old gentleman, former smoker, followed by Dr. Alicja Rob of our practice for asthma/COPD overlap  syndrome and obstructive sleep apnea being treated conservatively. He has no history of TOM colonization/infection as listed in the "past medical history". He has been stable from a pulmonary perspective and maintained on budesonide and duoneb once daily and if needed. He also has a history of HTN, HLD, DM, CKD, CVA, CHF (mild systolic dysfunction) and atrial fibrillation with sick sinus syndrome s/p pacemaker implantation. He has been followed for many months for chronic foot wounds and leg pain now with some purulence and gangrene. The pain is exacerbated when laying in bed and improves with tylenol and when hanging the legs off of the bed. He is no longer able to ambulate. 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 The patient has developed marked shortness of breath with severe hypoxemia currently requiring a nasal canula @ 5lpm to maintain saturation @ 90%. He has a cough with copious mucous in his chest which he is unable to expectorate. He has chest congestion and wheeze. No fevers, chills or sweats. No chest pain/pressure or palpitations. Called by the patient's family and asked to be involved with his pulmonary care.    acute hypoxic respiratory failure    1) severe COPD exacerbation -> slowly improving but exacerbated by ongoing aspiration  2) 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 and respiratory muscle weakness limiting diaphragmatic excursion and chest wall expansion -> bibasilar atelectasis has improved on repeat CXR  3) no evidence of pulmonary edema or pneumonia  4) 4/28 -> possible aspiration pneumonia    leukocytosis -> resolved    steroid induced hyperglycemia -> resolved    CKD/KARLOS due to diuresis in the setting of euvolemia -> improved    4/14 - remains in the ICU; continues on an insulin infusion for steroid induced hyperglycemia; worsening of his mental status and chronic left sided weakness and left facial droop after analgesics prior to the VAC change yesterday; CT scan and EEG are unremarkable; started on zosyn for possible "sepsis"; now awake and alert sitting in the chair and back to his baseline mental status; no shortness of breath or hypoxemia on room air; occasional cough productive of scant sputum; minimal chest congestion and wheeze; no fevers, chills or sweats; no chest pain/pressure or palpitations; CXR now has bibasilar atelectasis - there is no evidence of pulmonary edema; on heparin gtt for PAD    4/15 - transferred to the surgical floor; mental status is back to baseline; left facial droop and left sided weakness are no worse than usual; continues on zosyn for possible "sepsis"; awake and alert sitting in the chair; no shortness of breath or hypoxemia on room air; occasional cough productive of scant sputum; minimal chest congestion and wheeze; no fevers, chills or sweats; no chest pain/pressure or palpitations; CXR is with a small left pleural effusion and bibasilar atelectasis - there is no evidence of pulmonary edema; on heparin gtt for PAD    4/20 -  left arm swelling ->no evidence of DVT on Duplex; FEEST revealed severe dysphagia -> non oral nutrition recommended -> the family has elected to continue oral feeding understanding the risks of aspiration; mental status is back to baseline; left facial droop and left sided weakness are no worse than usual; continues on zosyn for possible "sepsis"; awake and alert sitting in the chair; no shortness of breath or hypoxemia on room air; occasional cough productive of scant sputum; mild chest congestion and wheeze especially after eating; no fevers, chills or sweats; no chest pain/pressure or palpitations; CXR reveals improved bibasilar atelectasis - there is no evidence of pulmonary edema; on heparin gtt for PAD    4/22 - NPO and off heparin gtt awaiting AKA; awake and alert sitting up in bed; no shortness of breath or hypoxemia on room air; occasional cough productive of scant sputum; chest congestion and wheeze iis much improved and exacerbated when eating; no fevers, chills or sweats; no chest pain/pressure or palpitations; decreased left arm swelling ->no evidence of DVT on Duplex    4/25 - 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; seems sedated on an increased dose of gabapentin and "as needed" ultram for ongoing leg pain; remains on a heparin gtt now being bridged to coumadin; no shortness of breath or hypoxemia on a 2lpm nasal canula; occasional weak cough productive of scant sputum; chest congestion and wheeze is much improved and exacerbated when eating;    4/28 -  confusion yesterday initially due to hypoglycemia off steroids -> improved after glucose -> insulin adjusted; a second episode of altered mental followed with normal glucose -> started on antibiotics due to concern of a stump infection and transferred to the ICU for observation; now more awake and alert but not back to baseline mental status sitting in the chair; 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 -> significant right leg pain continues now off narcotics and gabapentin due to confusion; no shortness of breath or hypoxemia on a 2lpm nasal canula; occasional weak cough productive of scant sputum; persistent chest congestion and wheeze exacerbated when eating; no fevers, chills or sweats; no chest pain/pressure or palpitations; FEEST revealed severe dysphagia -> non oral nutrition recommended -> the family has elected to continue oral feeding understanding the risks of aspiration (small bites and easy to chew diet with moderately thickened fluids ordered); left facial droop and left sided weakness are at baseline); received 1 unit PRBCs 4/26 for post-operative anemia; hudson remains in place due to urinary retention      PLAN/RECOMMENDATIONS:    stable oxygenation on room air  has completed a steroid taper - glucose control is improved  albuterol/atrovent nebs q6h  pulmicort 0.5mg nebs q12h - give after duoneb - rinse mouth after use  mucinex 1200mg 2 times daily  singulair 10mg @ bedtime  glycopyrrolate 0.1mg IVP q12h x 4 doses -> secretions seem improved -> transition to glycopyrrolate 1mg 2 times daily po in AM - watch for tachycardia, extreme dryness, urinary retention, etc  acapella device/incentive spirometer  zosyn for possible aspiration pneumonia and/or right BKD stump infection - blood cultures are negative - MRSA nasal swab is negative - vancomycin discontinued  cardiac meds: lipitor/toprol XL/lasix - still holding cozaar and metolazone  flomax/proscar -> hudson replaced due to urinary retention  GI prophylaxis - protonix  bowel regimen  glucose control    speech and swallow evaluation noted - severe dysphagia - NPO with non-oral feeding recommended - I explained the ongoing risk of aspiration with possible pneumonia, worsening bronchospasm, hypoxemia, respiratory failure etc to the patient and family - they do not want placement of a NGT or PEG and wish to continue an oral diet - written for a bite sized and easy to chew diet with moderately thickened fluids - small bites - no straws - chin tuck maneuver explained and demonstrated    EEG -> mild to moderate nonspecific diffuse or multifocal cerebral dysfunction -  no epileptiform patterns or seizures recorded.  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, stable in appearance compared with prior dated 9/8/2019  CXR 4/18 - improving bilateral lower lobe opacities (atelectasis)  vascular surgery follow-up    heparin gtt bridging to coumadin    pain control - tylenol - oxycodone as needed    s/p completion BKA    wound care  out of bed and into the chair  LUE Duplex is without DVT    Will follow with you. Plan of care discussed with the patient and his family at bedside. Discharge planning.    Sarabjit Wright MD, [facility]  [phone]  Pulmonary Medicine

## 2022-05-01 NOTE — PROGRESS NOTE ADULT - SUBJECTIVE AND OBJECTIVE BOX
Surgery Progress Note    SUBJECTIVE: Pt seen and examined at bedside. Patient comfortable and in no-apparent distress. No acute events.    Vital Signs Last 24 Hrs  T(C): 36.9 (30 Apr 2022 21:07), Max: 37.1 (30 Apr 2022 17:42)  T(F): 98.5 (30 Apr 2022 21:07), Max: 98.7 (30 Apr 2022 17:42)  HR: 87 (30 Apr 2022 21:07) (70 - 94)  BP: 134/65 (30 Apr 2022 21:07) (131/63 - 165/69)  BP(mean): 94 (30 Apr 2022 05:00) (88 - 94)  RR: 18 (30 Apr 2022 21:07) (13 - 26)  SpO2: 95% (30 Apr 2022 21:07) (88% - 96%)    Physical Exam:  General Appearance: Appears well, NAD  Respiratory: breathing comfortably on room air  CV: Pulse regularly present   R BKA stump with operative dressing in place that is c/d/i, no strikethrough noted, stump appears swollen, erythematous and with increase skin discoloration along the stump line, staples are intact with out any purulent or serosanguinous discharge, knee immobilizer in place    LABS:                        7.7    7.69  )-----------( 96       ( 30 Apr 2022 00:33 )             24.9     04-30    138  |  102  |  38<H>  ----------------------------<  186<H>  3.9   |  22  |  1.10    Ca    8.3<L>      30 Apr 2022 00:33  Phos  3.5     04-30  Mg     2.1     04-30      PT/INR - ( 30 Apr 2022 00:33 )   PT: 19.9 sec;   INR: 1.71 ratio         PTT - ( 30 Apr 2022 00:33 )  PTT:71.2 sec      INs and OUTs:    04-29-22 @ 07:01  -  04-30-22 @ 07:00  --------------------------------------------------------  IN: 550.5 mL / OUT: 1120 mL / NET: -569.5 mL    04-30-22 @ 07:01  -  05-01-22 @ 00:06  --------------------------------------------------------  IN: 540 mL / OUT: 1050 mL / NET: -510 mL

## 2022-05-01 NOTE — PROGRESS NOTE ADULT - SUBJECTIVE AND OBJECTIVE BOX
Ritesh Bell MD  Interventional Cardiology / Advance Heart Failure and Cardiac Transplant Specialist  [place] Office : [phone] [place] [address] [place] [address]  Tel:   [place] Office : [address] [facility] [place] [address]  Tel: [phone]       Pt is lying in bed comfortable not in distress, no chest pains no SOB no palpitations complains of burning at amputation site  	  MEDICATIONS:  furosemide    Tablet 20 milliGRAM(s) Oral two times a day  heparin  Infusion 1550 Unit(s)/Hr IV Continuous <Continuous>  metoprolol succinate ER 25 milliGRAM(s) Oral daily  tamsulosin 0.8 milliGRAM(s) Oral at bedtime  warfarin 10 milliGRAM(s) Oral once    piperacillin/tazobactam IVPB.. 3.375 Gram(s) IV Intermittent every 8 hours    albuterol/ipratropium for Nebulization 3 milliLiter(s) Nebulizer every 6 hours  buDESOnide    Inhalation Suspension 0.5 milliGRAM(s) Inhalation every 12 hours  guaiFENesin ER 1200 milliGRAM(s) Oral every 12 hours  montelukast 10 milliGRAM(s) Oral daily    acetaminophen     Tablet .. 975 milliGRAM(s) Oral every 6 hours  gabapentin 100 milliGRAM(s) Oral every 8 hours  oxyCODONE    IR 5 milliGRAM(s) Oral every 6 hours PRN    glycopyrrolate Injectable 0.1 milliGRAM(s) IV Push two times a day  pantoprazole    Tablet 40 milliGRAM(s) Oral before breakfast  polyethylene glycol 3350 17 Gram(s) Oral daily  senna 2 Tablet(s) Oral at bedtime    atorvastatin 40 milliGRAM(s) Oral at bedtime  finasteride 5 milliGRAM(s) Oral daily  insulin glargine Injectable (LANTUS) 10 Unit(s) SubCutaneous at bedtime  insulin lispro (ADMELOG) corrective regimen sliding scale   SubCutaneous three times a day before meals  insulin lispro (ADMELOG) corrective regimen sliding scale   SubCutaneous at bedtime  insulin lispro Injectable (ADMELOG) 1 Unit(s) SubCutaneous three times a day before meals  levothyroxine 25 MICROGram(s) Oral daily    multivitamin/minerals 1 Tablet(s) Oral daily  potassium chloride    Tablet ER 20 milliEquivalent(s) Oral every 2 hours      PAST MEDICAL/SURGICAL HISTORY  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    Deep Vein Thrombosis (DVT)  17 yeras ago on Coumadin    CHF (congestive heart failure)  last exacerbation in 1/2017    Enlarged prostate    GERD (gastroesophageal reflux disease)    Hernia  umblical    Calculus of bile duct without cholangitis or cholecystitis without obstruction    Atrial fibrillation    S/P Hernia Repair    S/P cataract surgery, unspecified laterality    S/P ERCP  3/2017        SOCIAL HISTORY: Substance Use (street drugs): ( x ) never used  (  ) other:    FAMILY HISTORY:       PHYSICAL EXAM:  T(C): 36.7 (05-01-22 @ 12:56), Max: 37.2 (05-01-22 @ 00:50)  HR: 78 (05-01-22 @ 12:56) (78 - 87)  BP: 148/67 (05-01-22 @ 12:56) (129/65 - 159/63)  RR: 18 (05-01-22 @ 12:56) (18 - 18)  SpO2: 96% (05-01-22 @ 12:56) (94% - 99%)  Wt(kg): --  I&O's Summary    30 Apr 2022 07:01  -  01 May 2022 07:00  --------------------------------------------------------  IN: 540 mL / OUT: 1600 mL / NET: -1060 mL    01 May 2022 07:01  -  01 May 2022 13:40  --------------------------------------------------------  IN: 240 mL / OUT: 600 mL / NET: -360 mL           EYES:   PERRLA   ENMT:   Moist mucous membranes, Good dentition, No lesions  Cardiovascular: Normal S1 S2, No JVD, No murmurs, No edema  Respiratory: b/l rhonchi   Gastrointestinal:  Soft, Non-tender, + BS	  Extremities: s/p AKA                                    7.7    5.90  )-----------( 106      ( 01 May 2022 07:28 )             26.1     05-01    141  |  105  |  30<H>  ----------------------------<  135<H>  3.7   |  23  |  1.01    Ca    8.4      01 May 2022 07:11  Phos  3.0     05-01  Mg     2.0     05-01      proBNP:   Lipid Profile:   HgA1c:   TSH:     Consultant(s) Notes Reviewed:  [x ] YES  [ ] NO    Care Discussed with Consultants/Other Providers [ x] YES  [ ] NO    Imaging Personally Reviewed independently:  [x] YES  [ ] NO    All labs, radiologic studies, vitals, orders and medications list reviewed. Patient is seen and examined at bedside. Case discussed with medical team.

## 2022-05-01 NOTE — PROGRESS NOTE ADULT - ASSESSMENT
87 yo male ex  smoker, h/o HTN, HLD, DM, CKD, CVA, CHF (mild systolic dysfunction) and atrial fibrillation with sick sinus syndrome s/p pacemaker implantation.  h/o COPD/ZACKARY, TOM colonization/infection, admitted on 4/1 with RLE wet gangrene, s/p R guillotine BKA,  AKA completed on 4/22  POD8 post R AKA, on ZOsyn for stump infection   pain controlled,   podiatry following left heel discoloration, wearing z flow boots in bed  s/p COPD exacerbation. completed Prednisone taper  albuterol/atrovent nebs   pulmicort 0.5mg nebs q12h   mucinex 1200mg 2 times daily  singulair 10mg @ bedtime  acapella device/incentive spirometer  on RA 91-92% pulse Ox  steroid induced hyperglycemia, now resolved and had hypoglycemia  CKD3/s/p KARLOS due to diuresis in the setting of euvolemia   has [place] for urinary retention  ptn transferred to the floor out of SICU, vitals are stable, glucose is stable, remains on dysphagia diet, would probably advance it to dysphgagia 1 diet since ptn failed FEEST. has a high risk of aspiration.   on Abx for poss ASP PNA and/or stump infection ( Zosyn),   on Hep drip being bridged to COumadin  HTN, systolic CHF, on lasix  Afib stable, cardiology following  dispo to [facility]

## 2022-05-01 NOTE — PROGRESS NOTE ADULT - SUBJECTIVE AND OBJECTIVE BOX
[facility] - PROGRESS NOTE    CHIEF COMPLAINT: acute hypoxic respiratory failure; COPD exacerbation; mucous plugging; atelectasis; weak cough; pleural effusion; dysphagia; right foot gangrene s/p BKA    INTERVAL HISTORY: up in bed - nebulizer mask on his face not attached to a nebulizer or oxygen; much more awake and alert with minimal confusion; continues on antibiotics due to concern of a stump infection and aspiration pneumonia; s/p completion right BKA  -> right leg pain is improved; no shortness of breath or hypoxemia on room air; occasional weak cough productive of scant sputum; persistent chest congestion and wheeze exacerbated when eating; no fevers, chills or sweats; no chest pain/pressure or palpitations; FEEST revealed severe dysphagia -> non oral nutrition recommended -> the family has elected to continue oral feeding understanding the risks of aspiration (small bites and easy to chew diet with moderately thickened fluids ordered); left facial droop and left sided weakness are at baseline); hudson remains in place due to urinary retention      REVIEW OF SYSTEMS:  Constitutional: As per interval history  HEENT: Within normal limits  CV: As per interval history  Resp: As per interval history  GI: dysphagia  : Within normal limits  Musculoskeletal: Within normal limits  Skin: Within normal limits  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 3.375 Gram(s) IV Intermittent every 8 hours    Cardiovascular:  furosemide    Tablet 20 milliGRAM(s) Oral two times a day  metoprolol succinate ER 25 milliGRAM(s) Oral daily  tamsulosin 0.8 milliGRAM(s) Oral at bedtime    Other:  acetaminophen     Tablet .. 975 milliGRAM(s) Oral every 6 hours  atorvastatin 40 milliGRAM(s) Oral at bedtime  finasteride 5 milliGRAM(s) Oral daily  glycopyrrolate Injectable 0.1 milliGRAM(s) IV Push two times a day  heparin  Infusion 1550 Unit(s)/Hr IV Continuous <Continuous>  insulin glargine Injectable (LANTUS) 10 Unit(s) SubCutaneous at bedtime  insulin lispro (ADMELOG) corrective regimen sliding scale   SubCutaneous three times a day before meals  insulin lispro (ADMELOG) corrective regimen sliding scale   SubCutaneous at bedtime  levothyroxine 25 MICROGram(s) Oral daily  multivitamin/minerals 1 Tablet(s) Oral daily  pantoprazole    Tablet 40 milliGRAM(s) Oral before breakfast  polyethylene glycol 3350 17 Gram(s) Oral daily  senna 2 Tablet(s) Oral at bedtime    MEDICATIONS  (PRN):  oxyCODONE    IR 5 milliGRAM(s) Oral every 6 hours PRN Severe Pain (7 - 10)    OBJECTIVE:    POCT Blood Glucose.: 196 mg/dL (2022 22:07)  POCT Blood Glucose.: 208 mg/dL (2022 17:40)  POCT Blood Glucose.: 195 mg/dL (2022 14:48)  POCT Blood Glucose.: 157 mg/dL (2022 09:20)      PHYSICAL EXAM:       ICU Vital Signs Last 24 Hrs  T(C): 37.2 (01 May 2022 00:50), Max: 37.2 (01 May 2022 00:50)  T(F): 98.9 (01 May 2022 00:50), Max: 98.9 (01 May 2022 00:50)  HR: 84 (01 May 2022 00:50) (70 - 87)  BP: 159/63 (01 May 2022 00:50) (134/65 - 165/69)  BP(mean): --  ABP: --  ABP(mean): --  RR: 18 (01 May 2022 00:50) (18 - 20)  SpO2: 99% (01 May 2022 00:50) (93% - 99%) on room air     General: Awake. Alert. Much less confused. Cooperative. No distress. Appears stated age. Obese.   HEENT: Atraumatic. Normocephalic. Anicteric. Normal oral mucosa. PERRL. EOMI.  Neck: Supple. Trachea midline. Thyroid without enlargement/tenderness/nodules. No carotid bruit. No JVD.	  Cardiovascular: Regular rate and rhythm. Distant S1 S2. No murmurs, rubs or gallops.  Respiratory: Respirations unlabored. Bilateral rhonchi and wheeze. No curvature.  Abdomen: Soft. Non-tender. Non-distended. No organomegaly. No masses. Normal bowel sounds.  Extremities: R BKA stump with operative dressing in place that is c/d/i - no strikethrough noted - stump appears swollen, erythematous and discolored along the stump line - staples are intact with out any purulent or serosanguinous discharge - knee immobilizer in place. Decreased swelling and pitting edema of the left upper extremity  Pulses: Decreased peripheral pulses LLE  Skin: Venous stasis changes left lower extremity  Lymph Nodes: Cervical, supraclavicular and axillary nodes normal  Neurological: Left sided weakness left > arm. Left facial droop. A and O x 1  Psychiatry: Appropriate mood and affect.    LABS:                          7.7    7.69  )-----------( 96       ( 2022 00:33 )             24.9     CBC    WBC  7.69 <==, 9.32 <==, 9.29 <==, 8.42 <==, 10.71 <==, 11.82 <==, 15.28 <==    Hemoglobin  7.7 <<==, 7.5 <<==, 8.5 <<==, 7.8 <<==, 8.2 <<==, 6.9 <<==, 7.3 <<==    Hematocrit  24.9 <==, 24.5 <==, 28.7 <==, 26.4 <==, 26.3 <==, 23.5 <==, 24.7 <==    Platelets  96 <==, 105 <==, 118 <==, 104 <==, 127 <==, 152 <==, 180 <==      141  |  105  |  30<H>  ----------------------------<  135<H>    05-  3.7   |  23  |  1.01      LYTES    sodium  141 <==, 138 <==, 141 <==, 145 <==, 144 <==, 142 <==, 144 <==    potassium   3.7 <==, 3.9 <==, 4.0 <==, 3.7 <==, 3.7 <==, 3.9 <==, 3.9 <==    chloride  105 <==, 102 <==, 104 <==, 106 <==, 106 <==, 105 <==, 105 <==    carbon dioxide  23 <==, 22 <==, 22 <==, 26 <==, 26 <==, 26 <==, 26 <==    =============================================================================================  RENAL FUNCTION:    Creatinine:   1.01  <<==, 1.10  <<==, 1.27  <<==, 0.93  <<==, 0.98  <<==, 0.99  <<==, 1.10  <<==    BUN:   30 <==, 38 <==, 42 <==, 34 <==, 39 <==, 39 <==, 45 <==    ============================================================================================    calcium   8.4 <==, 8.3 <==, 8.2 <==, 8.9 <==, 8.4 <==, 8.5 <==, 8.5 <==    phos   3.0 <==, 3.5 <==, 3.0 <==, 3.7 <==, 3.6 <==, 2.8 <==, 3.0 <==    mag   2.0 <==, 2.1 <==, 1.8 <==, 2.0 <==, 2.0 <==, 2.0 <==, 2.0 <==    ============================================================================================  LFTs    AST:   9 <==     ALT:  14  <==     AP:  55  <=    Bili:  0.5  <=      PT/INR - ( 2022 00:33 )   PT: 19.9 sec;   INR: 1.71 ratio       PTT - ( 2022 00:33 )  PTT: 71.2 sec    Venous Blood Gas:   @ 03:32  7.39/52/38/32/61.8  VBG Lactate: 1.0    ABG - ( 2022 19:45 )  pH: 7.48  /  pCO2: 41    /  pO2: 106   / HCO3: 30    / Base Excess: 6.4   /  SaO2: 99.3      Venous Blood Gas:   @ 22:10  7.40/41/52/25/84.4  VBG Lactate: 1.8    Venous Blood Gas:   @ 22:23  7.40/39/45/24/78.1  VBG Lactate: 2.4    Venous Blood Gas:   @ 22:23  7.40/39/45/24/78.1  VBG Lactate: 2.4    Venous Blood Gas:  04-10 @ 23:19  7.39/42/44/25/72.2  VBG Lactate: 2.5    Procalcitonin, Serum: 0.33 ng/mL ( @ 03:35)    CARDIAC MARKERS ( 2022 20:08 )  CPK x     /CKMB x     /CKMB Units 2.9 ng/mL    troponin x        CARDIAC MARKERS ( 2022 18:27 )  CPK x     /CKMB x     /CKMB Units x        troponin 204 ng/L    < from: TTE with Doppler (w/Cont) (22 @ 15:07) >    Patient name: Martir Heart  YOB: 1935   Age: 86 (M)   MR#: [id number]  Study Date: 4/3/2022  Location: [address] [place], [place] [id number]: Angie Olivarez [facility]  Study quality: Technically difficult  Referring Physician: Anmol Quinn MD  BloodPressure: 115/70 mmHg  Height: 173 cm  Weight: 92 kg  BSA: 2.1 m2  ------------------------------------------------------------------------  PROCEDURE: Transthoracic echocardiogram with 2-D, M-Mode  and complete spectral and color flow Doppler. Verbal  consent was obtained for injection of  Ultrasonic Enhancing  Agent following a discussion of risks and benefits.  Following intravenous injection of Ultrasonic Enhancing  Agent, harmonic imaging was performed.  INDICATION: Cardiomyopathy, unspecified (I42.9)  ------------------------------------------------------------------------  Dimensions:    Normal Values:  LA:     3.1    2.0 - 4.0 cm  Ao:     3.5    2.0 - 3.8 cm  SEPTUM: 1.1    0.6 - 1.2 cm  PWT:    1.3    0.6 - 1.1 cm  LVIDd:  4.3    3.0- 5.6 cm  LVIDs:  3.2    1.8 - 4.0 cm  Derived variables:  LVMI: 90 g/m2  RWT: 0.60  Fractional short: 26 %  EF (Visual Estimate): NWV %  Doppler Peak Velocity (m/sec): MV=1.6 AoV=1.4  ------------------------------------------------------------------------  Conclusions:  1. Aortic valve not well visualized; appears calcified.  Peak transaortic valve gradient equals 8 mm Hg, mean  transaortic valve gradient equals 3 mm Hg, estimated aortic  valve area equals 2 sqcm (by continuity equation), aortic  valve velocity time integral equals 22 cm, consistent with  mild aortic stenosis.  2. Endocardial visualization enhanced with intravenous  injection of Ultrasonic Enhancing Agent (Definity). Mild  left ventricular systolic dysfunction. The inferior wall,  and the inferoseptum are hypokinetic.  3. The right ventricle is not well visualized; grossly  normal right ventricular systolic function.  ------------------------------------------------------------------------  Confirmed on  4/3/2022 - 17:12:14 by BRITTANY Giraldo  ------------------------------------------------------------------------  -----------------------------------------------------------  MICROBIOLOGY:     COVID-19 PCR . (22 @ 18:23)   COVID-19 PCR: NotDetec:     Urinalysis Basic - ( 2022 02:47 )    Color: Yellow / Appearance: Clear / S.021 / pH: x  Gluc: x / Ketone: Negative  / Bili: Negative / Urobili: Negative   Blood: x / Protein: 30 mg/dL / Nitrite: Negative   Leuk Esterase: Negative / RBC: 12 /hpf / WBC 2 /HPF   Sq Epi: x / Non Sq Epi: 0 /hpf / Bacteria: Negative    Culture - Blood (22 @ 09:14)   Specimen Source: .Blood Blood-Peripheral   Culture Results:   No growth to date.     Culture - Blood (22 @ 09:14)   Specimen Source: .Blood Blood-Peripheral   Culture Results:   No growth to date.     RADIOLOGY:  [x] Chest radiographs reviewed and interpreted by me    EXAM:  XR CHEST PORTABLE URGENT 1V                          PROCEDURE DATE:  2022      FINDINGS:    The cardiomediastinal silhouette is not well evaluated in this   projection. Thoracic aortic calcifications. 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  New patchy right basilar opacities.  Worsening patchy left perihilar opacities, most pronounced in the mid to   lower lung.  No right pleural effusion.  There is continued left basilar and retrocardiac opacity.  No pneumothorax.  No acute bony abnormality.    IMPRESSION:  New patchy right basilar opacities which could be due to subsegmental   atelectasis or pneumonia.    Worsening patchy left perihilar opacities, most pronounced in the mid to   lower lung, possibly asymmetric pulmonary edema, atelectasis, and/or   pneumonia.    Continued left basilar and retrocardiac opacity which may be due to a   left pleural effusion with passive atelectasis, atelectasis of other   cause, and/or pneumonia.    DUSTY ZUÑIGA DO;Resident Radiologist  This document has been electronically signed.  IGLESIA DEL ROSARIO MD; Attending Radiologist  This document has been electronically signed. 2022  1:32PM  ---------------------------------------------------------------------------------------------------------------  EXAM:  CT BRAIN                          PROCEDURE DATE:  2022      FINDINGS:    Redemonstration of right cerebellar hemisphere, right greater than left   occipital, and high left posterior frontal chronic infarcts    Similar low density left lateral convexity subdural collection measuring   1 cm in greatest depth.    No midline shift or effacement of basal cisterns. No hydrocephalus.    No acute intracranial hemorrhage or brain edema. Moderate white matter   microvascular ischemic disease.    IMPRESSION:    No hydrocephalus, acute intracranial hemorrhage, mass effect, or brain   edema.    Similar low density left lateral convexity subdural collection measuring   1 cm in greatest depth.    No midline shift or effacement of basal cisterns. No hydrocephalus.    Chronic right cerebellar hemisphere, right greater than left occipital,   and high left posterior frontal infarcts.    KENA CASAS MD; Attending Radiologist  This document has been electronically signed. 2022  9:00AM  ---------------------------------------------------------------------------------------------------------------  EXAM:  DUPLEX EXT VEINS UPPER LT                          PROCEDURE DATE:  2022      IMPRESSION:  No evidence of left upper extremity deep venous thrombosis.    NARCISO MUÑOZ MD; Attending Radiologist  This document has been electronically signed. 2022  8:48PM  ---------------------------------------------------------------------------------------------------------------  EXAM:  XR CHEST PORTABLE URGENT 1V                          PROCEDURE DATE:  2022      FINDINGS:  Left chest wall pacemaker.  The heart size cannot be accurately evaluated on this projection.  Bilateral lower lung hazy opacities, reduced since 2022.  There is no pneumothorax.    IMPRESSION:  Partial clearing of the bases and left effusion.    ANITA INFANTE MD; Resident Radiologist  This document has been electronically signed.  HORACIO HELMS MD; Attending Interventional Radiologist  Thisdocument has been electronically signed. 2022  3:57PM  --------------------------------------------------------------------------------------------------------------

## 2022-05-01 NOTE — PROGRESS NOTE ADULT - ASSESSMENT
86M PMH HTN, HLD, DM2 and nonhealing wounds of RLE who is now s/p right laura BKA 4/4 and formalization 4/22. Hospital course complicated by severe COPD exacerbation requiring SICU admission. Now on the floor pending dispo.    PLAN:  - Daily dressing changes  - Pain control   - Continue hep gtt, Coumadin 7.5 mg today.   - CC Regs  - ISS  - Per podiatry re: left heel discoloration- cont with z flow boot in bed at all times, leave open to air  - Per cards: post op ok to transition to coumadin or Eliquis AND aspirin  - Home meds   - Dispo: [facility], family given choices    Vascular Surgery  [phone]

## 2022-05-01 NOTE — PROGRESS NOTE ADULT - ASSESSMENT
86M with PMH of HTN, HLD, Afib on coumadin, CHF, T2DM on insulin, COPD, CVA, BPH p/w R toe pain and ulcer after trauma.  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

## 2022-05-01 NOTE — PROGRESS NOTE ADULT - SUBJECTIVE AND OBJECTIVE BOX
Patient is a 87y old  Male who presents with a chief complaint of Preoperative Planning for Right above knee amputation (30 Apr 2022 10:54)      SUBJECTIVE / OVERNIGHT EVENTS: ptn transferred to the floor, vitals are stable, glucose is stable, remains on dysphagia diet, would probably advance it to dysphgagia 1 diet since ptn failed FEEST. on Abx for poss ASP PNA and or stump infection ( Zosyn), completed prednisone taper, hypoglycemia has resolved, on Hep drip being bridged to COumadin    MEDICATIONS  (STANDING):  acetaminophen     Tablet .. 975 milliGRAM(s) Oral every 6 hours  albuterol/ipratropium for Nebulization 3 milliLiter(s) Nebulizer every 6 hours  atorvastatin 40 milliGRAM(s) Oral at bedtime  buDESOnide    Inhalation Suspension 0.5 milliGRAM(s) Inhalation every 12 hours  finasteride 5 milliGRAM(s) Oral daily  furosemide    Tablet 20 milliGRAM(s) Oral two times a day  glycopyrrolate Injectable 0.1 milliGRAM(s) IV Push two times a day  guaiFENesin ER 1200 milliGRAM(s) Oral every 12 hours  heparin  Infusion 1550 Unit(s)/Hr (15.5 mL/Hr) IV Continuous <Continuous>  insulin glargine Injectable (LANTUS) 10 Unit(s) SubCutaneous at bedtime  insulin lispro (ADMELOG) corrective regimen sliding scale   SubCutaneous at bedtime  insulin lispro (ADMELOG) corrective regimen sliding scale   SubCutaneous three times a day before meals  levothyroxine 25 MICROGram(s) Oral daily  metoprolol succinate ER 25 milliGRAM(s) Oral daily  montelukast 10 milliGRAM(s) Oral daily  multivitamin/minerals 1 Tablet(s) Oral daily  pantoprazole    Tablet 40 milliGRAM(s) Oral before breakfast  piperacillin/tazobactam IVPB.. 3.375 Gram(s) IV Intermittent every 8 hours  polyethylene glycol 3350 17 Gram(s) Oral daily  senna 2 Tablet(s) Oral at bedtime  tamsulosin 0.8 milliGRAM(s) Oral at bedtime  warfarin 7.5 milliGRAM(s) Oral once    MEDICATIONS  (PRN):  oxyCODONE    IR 5 milliGRAM(s) Oral every 6 hours PRN Severe Pain (7 - 10)      Vital Signs Last 24 Hrs  T(F): 97.8 (04-30-22 @ 09:10), Max: 98.6 (04-30-22 @ 03:00)  HR: 70 (04-30-22 @ 09:10) (65 - 94)  BP: 148/70 (04-30-22 @ 09:10) (108/51 - 159/76)  RR: 20 (04-30-22 @ 09:10) (13 - 38)  SpO2: 93% (04-30-22 @ 09:10) (88% - 97%)  Telemetry:   CAPILLARY BLOOD GLUCOSE      POCT Blood Glucose.: 157 mg/dL (30 Apr 2022 09:20)  POCT Blood Glucose.: 242 mg/dL (29 Apr 2022 21:42)  POCT Blood Glucose.: 133 mg/dL (29 Apr 2022 17:40)    I&O's Summary    29 Apr 2022 07:01  -  30 Apr 2022 07:00  --------------------------------------------------------  IN: 550.5 mL / OUT: 1120 mL / NET: -569.5 mL        PHYSICAL EXAM:  GENERAL: NAD, well-developed  HEAD:  Atraumatic, Normocephalic  EYES: EOMI, PERRLA, conjunctiva and sclera clear  NECK: Supple, No JVD  CHEST/LUNG: Clear to auscultation bilaterally; No wheeze  HEART: Regular rate and rhythm; No murmurs, rubs, or gallops  ABDOMEN: Soft, Nontender, Nondistended; Bowel sounds present  EXTREMITIES:  2+ Peripheral Pulses, No clubbing, cyanosis, or edema  PSYCH: AAOx3  NEUROLOGY: non-focal  SKIN: No rashes or lesions    LABS:                        7.7    7.69  )-----------( 96       ( 30 Apr 2022 00:33 )             24.9     04-30    138  |  102  |  38<H>  ----------------------------<  186<H>  3.9   |  22  |  1.10    Ca    8.3<L>      30 Apr 2022 00:33  Phos  3.5     04-30  Mg     2.1     04-30      PT/INR - ( 30 Apr 2022 00:33 )   PT: 19.9 sec;   INR: 1.71 ratio         PTT - ( 30 Apr 2022 00:33 )  PTT:71.2 sec          RADIOLOGY & ADDITIONAL TESTS:    Imaging Personally Reviewed:    Consultant(s) Notes Reviewed:      Care Discussed with Consultants/Other Providers:

## 2022-05-01 NOTE — PROGRESS NOTE ADULT - ASSESSMENT
87 y/o M w/ uncontrolled Type 2 DM complicated by neuropathy and nephropathy with hyperglycemia exacerbated by steroids> off steroids now. Also HTN, HLD, hypothyroidism admitted for R BKA completion 4/22. On 4/27 he had 2 rapids called on him and was transferred to the SICU for monitoring. He is now stable and doing better and back on floors. Tolerating POs w/ improving po intake.  Adding low dose premeal insulin. bg 100-200mg/ dl based on age/comorbidities        Problem/Plan - 1:  ·  Problem: Uncontrolled type 2 diabetes mellitus with hyperglycemia.   ·  Plan: -test BG AC/HS  -added admelog 1 unit TID AC hold if not tolerating po   -c/w Admelog low dose correction scale AC and HS scale  -c/w lantus 10 units sq qhs   Discharge:   Will depend on PO intake/bg levels and insulin needs. Likely going to rehab  Outpt. endo follow-up  Outpt. optho, podiatry, nephrology.     Problem/Plan - 2:  ·  Problem: Essential hypertension.   ·  Plan: Goal BP <140/90, on metoprolol.  Manage per primary team.     Problem/Plan - 3:  ·  Problem: Hyperlipidemia.   ·  Plan: C/w atorvastatin 40 milliGRAM(s) Oral at bedtime  -F/u levels as out pt.     Problem/Plan - 4:  ·  Problem: Hypothyroidism.   ·  Plan: -C/w levothyroxine 25 MICROGram(s) Oral daily    Repeat TFTs as outpatient  -synthroid received w/ other medications this am but 1 hour prior to protonix, synthroid should be given by itself 1 hour prior to any medications  avoid administering protonix and synthroid together as protonix will alter absorption of synthroid     Discussed with patient , RN and primary  team Vascular  Contact via [employer] Teams during business hours  On evenings and weekends, please call [phone] or page endocrine fellow on call.   Please note that this patient may be followed by different provider tomorrow.    Time spent on encounter: 28  minutes spent on total encounter; The necessity of the time spent during the encounter on this date of service was due to development of plan of care/coordination of care/glycemic control through review of labs, blood glucose values and vital signs.

## 2022-05-01 NOTE — PROGRESS NOTE ADULT - SUBJECTIVE AND OBJECTIVE BOX
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 per RN at bedside pt ate 100%of breakfast, pt also reports tolerating better po today, noted FBG at goal, prandial BG 190s-208       Review of Systems:  General: As above  Cardiovascular: No chest pain  Respiratory: No SOB  GI: No nausea, vomiting  Endocrine: no  S&Sx of hypoglycemia    Allergies    No Known Allergies    Intolerances      MEDICATIONS  (STANDING):  acetaminophen     Tablet .. 975 milliGRAM(s) Oral every 6 hours  albuterol/ipratropium for Nebulization 3 milliLiter(s) Nebulizer every 6 hours  atorvastatin 40 milliGRAM(s) Oral at bedtime  buDESOnide    Inhalation Suspension 0.5 milliGRAM(s) Inhalation every 12 hours  finasteride 5 milliGRAM(s) Oral daily  furosemide    Tablet 20 milliGRAM(s) Oral two times a day  gabapentin 100 milliGRAM(s) Oral every 8 hours  glycopyrrolate Injectable 0.1 milliGRAM(s) IV Push two times a day  guaiFENesin ER 1200 milliGRAM(s) Oral every 12 hours  heparin  Infusion 1550 Unit(s)/Hr (15.5 mL/Hr) IV Continuous <Continuous>  insulin glargine Injectable (LANTUS) 10 Unit(s) SubCutaneous at bedtime  insulin lispro (ADMELOG) corrective regimen sliding scale   SubCutaneous at bedtime  insulin lispro (ADMELOG) corrective regimen sliding scale   SubCutaneous three times a day before meals  levothyroxine 25 MICROGram(s) Oral daily  metoprolol succinate ER 25 milliGRAM(s) Oral daily  montelukast 10 milliGRAM(s) Oral daily  multivitamin/minerals 1 Tablet(s) Oral daily  pantoprazole    Tablet 40 milliGRAM(s) Oral before breakfast  piperacillin/tazobactam IVPB.. 3.375 Gram(s) IV Intermittent every 8 hours  polyethylene glycol 3350 17 Gram(s) Oral daily  potassium chloride    Tablet ER 20 milliEquivalent(s) Oral every 2 hours  senna 2 Tablet(s) Oral at bedtime  tamsulosin 0.8 milliGRAM(s) Oral at bedtime  warfarin 10 milliGRAM(s) Oral once      PHYSICAL EXAM:  VITALS: T(C): 36.5 (05-01-22 @ 09:00)  T(F): 97.7 (05-01-22 @ 09:00), Max: 98.9 (05-01-22 @ 00:50)  HR: 82 (05-01-22 @ 09:00) (75 - 87)  BP: 129/65 (05-01-22 @ 09:00) (129/65 - 165/69)  RR:  (18 - 18)  SpO2:  (94% - 99%)  Wt(kg): --  GENERAL: male laying in bed in NAD  Abdomen: Soft, nontender, non distended  Extremities: Warm,  R BKA  NEURO: Alert appropriate     LABS:  POCT Blood Glucose.: 152 mg/dL (05-01-22 @ 09:43)  POCT Blood Glucose.: 196 mg/dL (04-30-22 @ 22:07)  POCT Blood Glucose.: 208 mg/dL (04-30-22 @ 17:40)  POCT Blood Glucose.: 195 mg/dL (04-30-22 @ 14:48)  POCT Blood Glucose.: 157 mg/dL (04-30-22 @ 09:20)  POCT Blood Glucose.: 242 mg/dL (04-29-22 @ 21:42)  POCT Blood Glucose.: 133 mg/dL (04-29-22 @ 17:40)  POCT Blood Glucose.: 230 mg/dL (04-29-22 @ 11:54)  POCT Blood Glucose.: 230 mg/dL (04-28-22 @ 21:53)  POCT Blood Glucose.: 193 mg/dL (04-28-22 @ 17:36)                            7.7    5.90  )-----------( 106      ( 01 May 2022 07:28 )             26.1       05-01    141  |  105  |  30<H>  ----------------------------<  135<H>  3.7   |  23  |  1.01    Ca    8.4      01 May 2022 07:11  Phos  3.0     05-01  Mg     2.0     05-01        eGFR: 72 mL/min/1.73m2 (01 May 2022 07:11)          Thyroid Function Tests:          A1C with Estimated Average Glucose Result: 6.8 % (04-02-22 @ 12:21)      Estimated Average Glucose: 148 mg/dL (04-02-22 @ 12:21)

## 2022-05-02 NOTE — DISCHARGE NOTE PROVIDER - CARE PROVIDER_API CALL
Anmol Quinn)  Surgery  Vascular  [address] [place] [address]  Phone: [phone]  Fax: [phone]  Follow Up Time: 1 week    Den Kapoor)  Internal Medicine; Nephrology  [address] [address]  Phone: [phone]  Fax: [phone]  Follow Up Time: 1 week    Sarabjit Wright)  Internal Medicine; Pulmonary Disease  [address] [facility], [address] [address]  Phone: [phone]  Fax: [phone]  Follow Up Time: 1 week    Ritesh Bell  CARDIOVASCULAR DISEASE  [address] [place] [address]  Phone: [phone]  Fax: [phone]  Follow Up Time: 1 week

## 2022-05-02 NOTE — DISCHARGE NOTE PROVIDER - NSDCCPTREATMENT_GEN_ALL_CORE_FT
PRINCIPAL PROCEDURE  Procedure: Guillotine amputation below knee  Findings and Treatment: WOUND CARE: staples to be removed at outpatient office follow up visit; daily dressing changes to right stump- cover with gauze and paper tape, maintain knee immobilizer  BATHING: You may shower daily. Let warm soapy water run over incisions in shower. Do not scrub incisions directly. Pat dry. Do not take tub baths or submerge your incisions in water for 4 weeks to ensure proper healing.  ACTIVITY: No heavy lifting anything more than 10-15lbs (about the weight of a gallon of milk) or straining. Avoid strenuous activity until cleared by your surgeon.   DIET: carbohydrate consistent diet soft and bite sized + moderately thickened liquids  NOTIFY YOUR SURGEON IF: You have any bleeding that does not stop, any pus draining from your wound, any fever (over 100.4 F) or chills, persistent nausea/vomiting with inability to tolerate food or liquids, persistent diarrhea, or if your pain is not controlled on your discharge pain medications.  FOLLOW-UP:  - Please call to make a follow-up appointment within 1-2 weeks of discharge  with your surgeon Dr Quinn  - Please follow up with your primary care physician in 1-2 weeks regarding your hospitalization      SECONDARY PROCEDURE  Procedure: Below knee amputation  Findings and Treatment:

## 2022-05-02 NOTE — PROGRESS NOTE ADULT - SUBJECTIVE AND OBJECTIVE BOX
GENERAL SURGERY DAILY PROGRESS NOTE:    Interval:  No acute events overnight.    Subjective:  Patient seen and examined. Reports pain is well controlled. Denies N/V.    Vital Signs Last 24 Hrs  T(C): 37.1 (01 May 2022 21:15), Max: 37.2 (01 May 2022 00:50)  T(F): 98.7 (01 May 2022 21:15), Max: 98.9 (01 May 2022 00:50)  HR: 85 (01 May 2022 21:15) (78 - 85)  BP: 153/69 (01 May 2022 21:15) (129/65 - 159/63)  BP(mean): --  RR: 18 (01 May 2022 21:15) (18 - 18)  SpO2: 95% (01 May 2022 21:15) (94% - 99%)    Physical Exam:  General Appearance: Appears well, NAD  Respiratory: breathing comfortably on room air  CV: Pulse regularly present   R BKA stump with operative dressing in place that is c/d/i, no strikethrough noted, stump appears swollen, erythematous and with increase skin discoloration along the stump line, staples are intact with out any purulent or serosanguinous discharge, knee immobilizer in place    I&O's Detail    30 Apr 2022 07:01  -  01 May 2022 07:00  --------------------------------------------------------  IN:    Oral Fluid: 540 mL  Total IN: 540 mL    OUT:    Indwelling Catheter - Urethral (mL): 1600 mL  Total OUT: 1600 mL    Total NET: -1060 mL      01 May 2022 07:01  -  02 May 2022 00:37  --------------------------------------------------------  IN:    Oral Fluid: 660 mL  Total IN: 660 mL    OUT:    Indwelling Catheter - Urethral (mL): 1300 mL  Total OUT: 1300 mL    Total NET: -640 mL          Daily     Daily     MEDICATIONS  (STANDING):  acetaminophen     Tablet .. 975 milliGRAM(s) Oral every 6 hours  albuterol/ipratropium for Nebulization 3 milliLiter(s) Nebulizer every 6 hours  atorvastatin 40 milliGRAM(s) Oral at bedtime  buDESOnide    Inhalation Suspension 0.5 milliGRAM(s) Inhalation every 12 hours  finasteride 5 milliGRAM(s) Oral daily  furosemide    Tablet 20 milliGRAM(s) Oral two times a day  gabapentin 100 milliGRAM(s) Oral every 8 hours  glycopyrrolate 1 milliGRAM(s) Oral two times a day  guaiFENesin ER 1200 milliGRAM(s) Oral every 12 hours  heparin  Infusion 1550 Unit(s)/Hr (15.5 mL/Hr) IV Continuous <Continuous>  insulin glargine Injectable (LANTUS) 10 Unit(s) SubCutaneous at bedtime  insulin lispro (ADMELOG) corrective regimen sliding scale   SubCutaneous three times a day before meals  insulin lispro (ADMELOG) corrective regimen sliding scale   SubCutaneous at bedtime  insulin lispro Injectable (ADMELOG) 1 Unit(s) SubCutaneous three times a day before meals  levothyroxine 25 MICROGram(s) Oral daily  metoprolol succinate ER 25 milliGRAM(s) Oral daily  montelukast 10 milliGRAM(s) Oral daily  multivitamin/minerals 1 Tablet(s) Oral daily  pantoprazole    Tablet 40 milliGRAM(s) Oral before breakfast  piperacillin/tazobactam IVPB.. 3.375 Gram(s) IV Intermittent every 8 hours  polyethylene glycol 3350 17 Gram(s) Oral daily  senna 2 Tablet(s) Oral at bedtime  tamsulosin 0.8 milliGRAM(s) Oral at bedtime    MEDICATIONS  (PRN):  acetaminophen   IVPB .. 1000 milliGRAM(s) IV Intermittent once PRN Mild Pain (1 - 3)  oxyCODONE    IR 5 milliGRAM(s) Oral every 6 hours PRN Severe Pain (7 - 10)      LABS:                        7.7    5.90  )-----------( 106      ( 01 May 2022 07:28 )             26.1     05-01    141  |  105  |  30<H>  ----------------------------<  135<H>  3.7   |  23  |  1.01    Ca    8.4      01 May 2022 07:11  Phos  3.0     05-01  Mg     2.0     05-01      PT/INR - ( 01 May 2022 07:29 )   PT: 19.0 sec;   INR: 1.63 ratio         PTT - ( 01 May 2022 07:29 )  PTT:60.3 sec    86M PMH HTN, HLD, DM2 and nonhealing wounds of RLE who is now s/p right laura [facility]MA[place] 4/4 and formalization 4/22. Hospital course complicated by severe COPD exacerbation requiring SICU admission. Now on the floor pending dispo.    PLAN:  - Daily dressing changes  - Pain control   - f/u INR and redose warfarin accordingly  - CC Regs  - ISS  - Per podiatry re: left heel discoloration- cont with z flow boot in bed at all times, leave open to air  - Per cards: post op ok to transition to coumadin or Eliquis AND aspirin  - Home meds   - Dispo: [facility], family given choices    Vascular Surgery  [phone]   GENERAL SURGERY DAILY PROGRESS NOTE:    Interval:  No acute events overnight.    Subjective:  Patient seen and examined. Reports pain is well controlled. Denies N/V.    Vital Signs Last 24 Hrs  T(C): 37.1 (01 May 2022 21:15), Max: 37.2 (01 May 2022 00:50)  T(F): 98.7 (01 May 2022 21:15), Max: 98.9 (01 May 2022 00:50)  HR: 85 (01 May 2022 21:15) (78 - 85)  BP: 153/69 (01 May 2022 21:15) (129/65 - 159/63)  BP(mean): --  RR: 18 (01 May 2022 21:15) (18 - 18)  SpO2: 95% (01 May 2022 21:15) (94% - 99%)    Physical Exam:  General lying in bed in nad  Respiratory: non labored   R BKA stump with operative dressing in place that is c/d/i, no strikethrough noted, stump appears swollen, erythematous and with some skin discoloration along the stump line, staples are intact with out any purulent or serosanguinous discharge, knee immobilizer in place    I&O's Detail    30 Apr 2022 07:01  -  01 May 2022 07:00  --------------------------------------------------------  IN:    Oral Fluid: 540 mL  Total IN: 540 mL    OUT:    Indwelling Catheter - Urethral (mL): 1600 mL  Total OUT: 1600 mL    Total NET: -1060 mL      01 May 2022 07:01  -  02 May 2022 00:37  --------------------------------------------------------  IN:    Oral Fluid: 660 mL  Total IN: 660 mL    OUT:    Indwelling Catheter - Urethral (mL): 1300 mL  Total OUT: 1300 mL    Total NET: -640 mL          Daily     Daily     MEDICATIONS  (STANDING):  acetaminophen     Tablet .. 975 milliGRAM(s) Oral every 6 hours  albuterol/ipratropium for Nebulization 3 milliLiter(s) Nebulizer every 6 hours  atorvastatin 40 milliGRAM(s) Oral at bedtime  buDESOnide    Inhalation Suspension 0.5 milliGRAM(s) Inhalation every 12 hours  finasteride 5 milliGRAM(s) Oral daily  furosemide    Tablet 20 milliGRAM(s) Oral two times a day  gabapentin 100 milliGRAM(s) Oral every 8 hours  glycopyrrolate 1 milliGRAM(s) Oral two times a day  guaiFENesin ER 1200 milliGRAM(s) Oral every 12 hours  heparin  Infusion 1550 Unit(s)/Hr (15.5 mL/Hr) IV Continuous <Continuous>  insulin glargine Injectable (LANTUS) 10 Unit(s) SubCutaneous at bedtime  insulin lispro (ADMELOG) corrective regimen sliding scale   SubCutaneous three times a day before meals  insulin lispro (ADMELOG) corrective regimen sliding scale   SubCutaneous at bedtime  insulin lispro Injectable (ADMELOG) 1 Unit(s) SubCutaneous three times a day before meals  levothyroxine 25 MICROGram(s) Oral daily  metoprolol succinate ER 25 milliGRAM(s) Oral daily  montelukast 10 milliGRAM(s) Oral daily  multivitamin/minerals 1 Tablet(s) Oral daily  pantoprazole    Tablet 40 milliGRAM(s) Oral before breakfast  piperacillin/tazobactam IVPB.. 3.375 Gram(s) IV Intermittent every 8 hours  polyethylene glycol 3350 17 Gram(s) Oral daily  senna 2 Tablet(s) Oral at bedtime  tamsulosin 0.8 milliGRAM(s) Oral at bedtime    MEDICATIONS  (PRN):  acetaminophen   IVPB .. 1000 milliGRAM(s) IV Intermittent once PRN Mild Pain (1 - 3)  oxyCODONE    IR 5 milliGRAM(s) Oral every 6 hours PRN Severe Pain (7 - 10)      LABS:                        7.7    5.90  )-----------( 106      ( 01 May 2022 07:28 )             26.1     05-01    141  |  105  |  30<H>  ----------------------------<  135<H>  3.7   |  23  |  1.01    Ca    8.4      01 May 2022 07:11  Phos  3.0     05-01  Mg     2.0     05-01      PT/INR - ( 01 May 2022 07:29 )   PT: 19.0 sec;   INR: 1.63 ratio         PTT - ( 01 May 2022 07:29 )  PTT:60.3 sec    86M PMH HTN, HLD, DM2 and nonhealing wounds of RLE who is now 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 Hospital course complicated by severe COPD exacerbation requiring SICU admission. Now on the floor.    PLAN:  - Daily dressing changes  - Cont abx for now  - Heparin/coumadin bridge; F/u INR and redose warfarin accordingly  - Pain control   - CC Regs  - ISS  - Per podiatry re: left heel discoloration- cont with z flow boot in bed at all times, leave open to air  - Per cards: post op ok to transition to coumadin or Eliquis AND aspirin  - Dispo: [facility], family given choices; has chronic hudson which pt will be dcd with    Vascular Surgery  [id number]   GENERAL SURGERY DAILY PROGRESS NOTE:    Interval:  No acute events overnight.    Subjective:  Patient seen and examined. Reports pain is well controlled. Denies N/V.    Vital Signs Last 24 Hrs  T(C): 37.1 (01 May 2022 21:15), Max: 37.2 (01 May 2022 00:50)  T(F): 98.7 (01 May 2022 21:15), Max: 98.9 (01 May 2022 00:50)  HR: 85 (01 May 2022 21:15) (78 - 85)  BP: 153/69 (01 May 2022 21:15) (129/65 - 159/63)  BP(mean): --  RR: 18 (01 May 2022 21:15) (18 - 18)  SpO2: 95% (01 May 2022 21:15) (94% - 99%)    Physical Exam:  General lying in bed in nad  Respiratory: non labored   R BKA stump with operative dressing in place that is c/d/i, no strikethrough noted, stump appears swollen, erythematous and with some skin discoloration along the stump line, staples are intact with out any purulent or serosanguinous discharge, knee immobilizer in place    I&O's Detail    30 Apr 2022 07:01  -  01 May 2022 07:00  --------------------------------------------------------  IN:    Oral Fluid: 540 mL  Total IN: 540 mL    OUT:    Indwelling Catheter - Urethral (mL): 1600 mL  Total OUT: 1600 mL    Total NET: -1060 mL      01 May 2022 07:01  -  02 May 2022 00:37  --------------------------------------------------------  IN:    Oral Fluid: 660 mL  Total IN: 660 mL    OUT:    Indwelling Catheter - Urethral (mL): 1300 mL  Total OUT: 1300 mL    Total NET: -640 mL          Daily     Daily     MEDICATIONS  (STANDING):  acetaminophen     Tablet .. 975 milliGRAM(s) Oral every 6 hours  albuterol/ipratropium for Nebulization 3 milliLiter(s) Nebulizer every 6 hours  atorvastatin 40 milliGRAM(s) Oral at bedtime  buDESOnide    Inhalation Suspension 0.5 milliGRAM(s) Inhalation every 12 hours  finasteride 5 milliGRAM(s) Oral daily  furosemide    Tablet 20 milliGRAM(s) Oral two times a day  gabapentin 100 milliGRAM(s) Oral every 8 hours  glycopyrrolate 1 milliGRAM(s) Oral two times a day  guaiFENesin ER 1200 milliGRAM(s) Oral every 12 hours  heparin  Infusion 1550 Unit(s)/Hr (15.5 mL/Hr) IV Continuous <Continuous>  insulin glargine Injectable (LANTUS) 10 Unit(s) SubCutaneous at bedtime  insulin lispro (ADMELOG) corrective regimen sliding scale   SubCutaneous three times a day before meals  insulin lispro (ADMELOG) corrective regimen sliding scale   SubCutaneous at bedtime  insulin lispro Injectable (ADMELOG) 1 Unit(s) SubCutaneous three times a day before meals  levothyroxine 25 MICROGram(s) Oral daily  metoprolol succinate ER 25 milliGRAM(s) Oral daily  montelukast 10 milliGRAM(s) Oral daily  multivitamin/minerals 1 Tablet(s) Oral daily  pantoprazole    Tablet 40 milliGRAM(s) Oral before breakfast  piperacillin/tazobactam IVPB.. 3.375 Gram(s) IV Intermittent every 8 hours  polyethylene glycol 3350 17 Gram(s) Oral daily  senna 2 Tablet(s) Oral at bedtime  tamsulosin 0.8 milliGRAM(s) Oral at bedtime    MEDICATIONS  (PRN):  acetaminophen   IVPB .. 1000 milliGRAM(s) IV Intermittent once PRN Mild Pain (1 - 3)  oxyCODONE    IR 5 milliGRAM(s) Oral every 6 hours PRN Severe Pain (7 - 10)      LABS:                        7.7    5.90  )-----------( 106      ( 01 May 2022 07:28 )             26.1     05-01    141  |  105  |  30<H>  ----------------------------<  135<H>  3.7   |  23  |  1.01    Ca    8.4      01 May 2022 07:11  Phos  3.0     05-01  Mg     2.0     05-01      PT/INR - ( 01 May 2022 07:29 )   PT: 19.0 sec;   INR: 1.63 ratio         PTT - ( 01 May 2022 07:29 )  PTT:60.3 sec    86M PMH HTN, HLD, DM2 and nonhealing wounds of RLE who is now 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 Hospital course complicated by severe COPD exacerbation requiring SICU admission. Now on the floor.    PLAN:  - Daily dressing changes  - Cont abx for now  - Heparin/coumadin bridge; F/u INR and redose warfarin accordingly  - Pain control   - CC Regs  - ISS  - Per podiatry re: left heel discoloration- cont with z flow boot in bed at all times, leave open to air  - Dispo: [facility], family given choices; has chronic hudson which pt will be dcd with    Vascular Surgery  [id number]

## 2022-05-02 NOTE — DISCHARGE NOTE PROVIDER - HOSPITAL COURSE
Mr. Heart is an 86 year old gentleman with a PMH DM, HTN, HLD who has been followed for the past 6 months for foot wounds and leg pain. He underwent and angiogram with Dr. Quinn in september of 2021 and has been followed in the office. He reports having pain in the RLE mostly in the toes and has open wounds with some purulence and gangrene. The pain is worse at night when lying in bed, and improves with tylenol and dangling the foot off the bed. He is non-ambulatory at home.  He presents today from the office for preoperative planning and optimization for a RLE AKA on Monday 4/4 with Dr. Quinn    Pt admitted to vascular surgery. Cards/medicine/nephro consulted for pre op optimization. 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 The patient tolerated the operation well and there were no post-operative complications identified. Hep gtt restarted. 4/5 PT wound care evaluated/applied wound vac. Pulm consulted for acute hypoxic resp failure, recs followed- supp 02, ctx x 5 days, steroids, inhalers, acapella device. 4/7 sicu consulted for  HD monitoring in setting of severe COPD exacerbation requiring escalation of O2 supplementation with HFNC.   4/8  Started on HFNC for desaturation on the floor, diuresed with Lasix 20mg x1, then 80mg x1, hudson placed for strict I/O monitoring , Started on insulin gtt; Endo also consulted  4/9 Metolazone and losartan d/c'd due to nephrotoxicity iso worsening KARLOS   4/10 Weaned from HFNC to 3L NC, 1u pRBC for H/H 6.9/23.5  4/11 Failed trial of void x3, hudson catheter reinserted  4/14 patient was noted to have left sided facial droop, otherwise neuro exam at baseline per family (left sided mild weakness), given being on heparin infusion, it was stopped and emergent CT of head was performed which showed no ICH, and no signs of acute ischemic stroke. This is likely recrudescence; Transitioned off insulin gtt, started on Admelog 12 and Lantus 30, Hyperglycemic overnight, extra 10U Lantus and 8U Admelog given,  Insulin gtt restarted 2/2 persistent hyperglycemia, increasing leukocytosis, BCx sent and started on zosyn. Left labial facial droop, CTH overnight negative. On eeg- No epileptiform patterns or seizures recorded.  4/15 d/c insulin gtt, now on ISS and lantus pre meal  4/16 transferred to floors  4/18 ID consulted as amp site with erythema and pt coughing w rhonchi and lower lobe infiltrates, rec cont zosyn; podiatry consulted-  left heel discoloration secondary to ischemic changes , cont with z flow boot in bed at all times, leave open to air at this time  4/19 evaluated by speech- rec fees (had some coughing/wheezing when eating)  4/20- family had goals of care discussion about swallowing difficulty, does not wish to proceed with a PEG tube at this time. They are aware of risks of aspiration, such as aspiration pneumonia or death. Will start patient on pureed diet, moderately thickened liquids, per speech and swallow recommendations  4/22 sp OR for RLE completion BKA  4/25 failed tov, straight cath'd x3  4/26 sp 1u prbc, hudson reinserted  4/27 RRT called for AMS; pt found to be hypoglycemic to 53 mg/dl. Per RN, pt had received premeal insulin however did not eat much lunch. Vitals wnl on 2l NC. Pt arousable w/sternal rub. Pt sounds congested by breathing on 2L NC. RLE stump w/some dark fringes around erythematous base, no drainage. Labs notable for troponin of 204, .4. EKG w/o ischemic changes. Gave amp of D50 with improvement of glucose to 217 mg/dl. RRT ended.   Around 1.5hrs later, a further RRT was called for AMS. Pt arousable again to  sternal rub. POC glucose in 100's. ABG drawn, unremarkable. CXR ordered, no signs of pulm edema or pleural effusion. No FND. Pt responsive however AOx1, (baseline AOx2). CKMB and Myoglobin sent. RRT ended, per discussion w/fellow and attending, will obtain CT head non con, Abx, aspiration precautions, chest pt, and ICU consult; pt transferred to SICU for closer monitoring  4/29  Metoprolol decreased from 50mg to 25mg ER daily, Naproxen discontinued to preserve kidney function  4/30 Mental status improving, Per Cards ok to transition to coumadin or Eliquis AND aspirin, Hyperglycemic-> started on Lantus and Abx now administered with NS    Transferred to floors. Heparin gtt dcd when inr therapeutic and resumed home coumadin dosing. Steroids tapered off. Abx****    PT evaluated pt and recd keisha. PMR recd keisha. Pt to be dcd with chronic hudson. R knee immobilizer in place, daily dressing changes to stump.     Endo recs on dc*****    Patient continued to recover appropriately and was deemed stable for dc. At the time of discharge, the patient was hemodynamically stable, tolerating PO diet, passing stool, and was comfortable with adequate pain control. The patient was instructed to follow up with Dr. Quinn, jamari, optho, podiatry, nephrology and PMD within 1-2 weeks after discharge. Mr. Heart is an 86 year old gentleman with a PMH DM, HTN, HLD who has been followed for the past 6 months for foot wounds and leg pain. He underwent and angiogram with Dr. Quinn in september of 2021 and has been followed in the office. He reports having pain in the RLE mostly in the toes and has open wounds with some purulence and gangrene. The pain is worse at night when lying in bed, and improves with tylenol and dangling the foot off the bed. He is non-ambulatory at home.  He presents today from the office for preoperative planning and optimization for a RLE AKA on Monday 4/4 with Dr. Quinn    Pt admitted to vascular surgery. Cards/medicine/nephro consulted for pre op optimization. 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 The patient tolerated the operation well and there were no post-operative complications identified. Hep gtt restarted. 4/5 PT wound care evaluated/applied wound vac. Pulm consulted for acute hypoxic resp failure, recs followed- supp 02, ctx x 5 days, steroids, inhalers, acapella device. 4/7 sicu consulted for  HD monitoring in setting of severe COPD exacerbation requiring escalation of O2 supplementation with HFNC.   4/8  Started on HFNC for desaturation on the floor, diuresed with Lasix 20mg x1, then 80mg x1, hudson placed for strict I/O monitoring , Started on insulin gtt; Endo also consulted  4/9 Metolazone and losartan d/c'd due to nephrotoxicity iso worsening KARLOS   4/10 Weaned from HFNC to 3L NC, 1u pRBC for H/H 6.9/23.5  4/11 Failed trial of void x3, hudson catheter reinserted  4/14 patient was noted to have left sided facial droop, otherwise neuro exam at baseline per family (left sided mild weakness), given being on heparin infusion, it was stopped and emergent CT of head was performed which showed no ICH, and no signs of acute ischemic stroke. This is likely recrudescence; Transitioned off insulin gtt, started on Admelog 12 and Lantus 30, Hyperglycemic overnight, extra 10U Lantus and 8U Admelog given,  Insulin gtt restarted 2/2 persistent hyperglycemia, increasing leukocytosis, BCx sent and started on zosyn. Left labial facial droop, CTH overnight negative. On eeg- No epileptiform patterns or seizures recorded.  4/15 d/c insulin gtt, now on ISS and lantus pre meal  4/16 transferred to floors  4/18 ID consulted as amp site with erythema and pt coughing w rhonchi and lower lobe infiltrates, rec cont zosyn; podiatry consulted-  left heel discoloration secondary to ischemic changes , cont with z flow boot in bed at all times, leave open to air at this time  4/19 evaluated by speech- rec fees (had some coughing/wheezing when eating)  4/20- family had goals of care discussion about swallowing difficulty, does not wish to proceed with a PEG tube at this time. They are aware of risks of aspiration, such as aspiration pneumonia or death. Will start patient on pureed diet, moderately thickened liquids, per speech and swallow recommendations  4/22 sp OR for RLE completion BKA  4/25 failed tov, straight cath'd x3  4/26 sp 1u prbc, hudson reinserted  4/27 RRT called for AMS; pt found to be hypoglycemic to 53 mg/dl. Per RN, pt had received premeal insulin however did not eat much lunch. Vitals wnl on 2l NC. Pt arousable w/sternal rub. Pt sounds congested by breathing on 2L NC. RLE stump w/some dark fringes around erythematous base, no drainage. Labs notable for troponin of 204, .4. EKG w/o ischemic changes. Gave amp of D50 with improvement of glucose to 217 mg/dl. RRT ended.   Around 1.5hrs later, a further RRT was called for AMS. Pt arousable again to  sternal rub. POC glucose in 100's. ABG drawn, unremarkable. CXR ordered, no signs of pulm edema or pleural effusion. No FND. Pt responsive however AOx1, (baseline AOx2). CKMB and Myoglobin sent. RRT ended, per discussion w/fellow and attending, will obtain CT head non con, Abx, aspiration precautions, chest pt, and ICU consult; pt transferred to SICU for closer monitoring  4/29  Metoprolol decreased from 50mg to 25mg ER daily, Naproxen discontinued to preserve kidney function  4/30 Mental status improving, Per Cards ok to transition to coumadin or Eliquis AND aspirin, Hyperglycemic-> started on Lantus and Abx now administered with NS    Transferred to floors. Heparin gtt dcd when inr therapeutic and resumed home coumadin dosing. Steroids tapered off. Abx 7 days augementin.     PT evaluated pt and recd keisha. PMR recd keisha. Pt to be dcd with chronic hudson. R knee immobilizer in place, daily dressing changes to stump.    CT 5/3 with likely PNA. Discussed with pulmonary. OK to discharge on 7 days Augmentin.     Patient continued to recover appropriately and was deemed stable for dc. At the time of discharge, the patient was hemodynamically stable, tolerating PO diet, passing stool, and was comfortable with adequate pain control. The patient was instructed to follow up with Dr. Quinn, jamari, optho, podiatry, nephrology and PMD within 1-2 weeks after discharge. Mr. Heart is an 86 year old gentleman with a PMH DM, HTN, HLD who has been followed for the past 6 months for foot wounds and leg pain. He underwent and angiogram with Dr. Quinn in september of 2021 and has been followed in the office. He reports having pain in the RLE mostly in the toes and has open wounds with some purulence and gangrene. The pain is worse at night when lying in bed, and improves with tylenol and dangling the foot off the bed. He is non-ambulatory at home.  He presents today from the office for preoperative planning and optimization for a RLE AKA on Monday 4/4 with Dr. Quinn    Pt admitted to vascular surgery. Cards/medicine/nephro consulted for pre op optimization. 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 The patient tolerated the operation well and there were no post-operative complications identified. Hep gtt restarted. 4/5 PT wound care evaluated/applied wound vac. Pulm consulted for acute hypoxic resp failure, recs followed- supp 02, ctx x 5 days, steroids, inhalers, acapella device. 4/7 sicu consulted for  HD monitoring in setting of severe COPD exacerbation requiring escalation of O2 supplementation with HFNC.   4/8  Started on HFNC for desaturation on the floor, diuresed with Lasix 20mg x1, then 80mg x1, husdon placed for strict I/O monitoring , Started on insulin gtt; Endo also consulted  4/9 Metolazone and losartan d/c'd due to nephrotoxicity iso worsening KARLOS   4/10 Weaned from HFNC to 3L NC, 1u pRBC for H/H 6.9/23.5  4/11 Failed trial of void x3, hudson catheter reinserted  4/14 patient was noted to have left sided facial droop, otherwise neuro exam at baseline per family (left sided mild weakness), given being on heparin infusion, it was stopped and emergent CT of head was performed which showed no ICH, and no signs of acute ischemic stroke. This is likely recrudescence; Transitioned off insulin gtt, started on Admelog 12 and Lantus 30, Hyperglycemic overnight, extra 10U Lantus and 8U Admelog given,  Insulin gtt restarted 2/2 persistent hyperglycemia, increasing leukocytosis, BCx sent and started on zosyn. Left labial facial droop, CTH overnight negative. On eeg- No epileptiform patterns or seizures recorded.  4/15 d/c insulin gtt, now on ISS and lantus pre meal  4/16 transferred to floors  4/18 ID consulted as amp site with erythema and pt coughing w rhonchi and lower lobe infiltrates, rec cont zosyn; podiatry consulted-  left heel discoloration secondary to ischemic changes , cont with z flow boot in bed at all times, leave open to air at this time  4/19 evaluated by speech- rec fees (had some coughing/wheezing when eating)  4/20- family had goals of care discussion about swallowing difficulty, does not wish to proceed with a PEG tube at this time. They are aware of risks of aspiration, such as aspiration pneumonia or death. Will start patient on pureed diet, moderately thickened liquids, per speech and swallow recommendations  4/22 sp OR for RLE completion BKA  4/25 failed tov, straight cath'd x3  4/26 sp 1u prbc, hudson reinserted  4/27 RRT called for AMS; pt found to be hypoglycemic to 53 mg/dl. Per RN, pt had received premeal insulin however did not eat much lunch. Vitals wnl on 2l NC. Pt arousable w/sternal rub. Pt sounds congested by breathing on 2L NC. RLE stump w/some dark fringes around erythematous base, no drainage. Labs notable for troponin of 204, .4. EKG w/o ischemic changes. Gave amp of D50 with improvement of glucose to 217 mg/dl. RRT ended.   Around 1.5hrs later, a further RRT was called for AMS. Pt arousable again to  sternal rub. POC glucose in 100's. ABG drawn, unremarkable. CXR ordered, no signs of pulm edema or pleural effusion. No FND. Pt responsive however AOx1, (baseline AOx2). CKMB and Myoglobin sent. RRT ended, per discussion w/fellow and attending, will obtain CT head non con, Abx, aspiration precautions, chest pt, and ICU consult; pt transferred to SICU for closer monitoring  4/29  Metoprolol decreased from 50mg to 25mg ER daily, Naproxen discontinued to preserve kidney function  4/30 Mental status improving, Per Cards ok to transition to coumadin or Eliquis AND aspirin, Hyperglycemic-> started on Lantus and Abx now administered with NS    Transferred to floors. Heparin gtt dcd when inr therapeutic and resumed home coumadin dosing. Steroids tapered off. Abx 7 days augementin.     PT evaluated pt and recd keisha. PMR recd keisha. Pt to be dcd with chronic hudson. R knee immobilizer in place, daily dressing changes to stump.    CT 5/3 with likely PNA. Discussed with pulmonary. OK to discharge on 7 days Augmentin.     Patient continued to recover appropriately and was deemed stable for dc. At the time of discharge, the patient was hemodynamically stable, tolerating PO diet, passing stool, and was comfortable with adequate pain control. The patient was instructed to follow up with Dr. Quinn, jamari, optho, podiatry, nephrology and PMD within 1-2 weeks after discharge.     ok to skip 6 pm dose of robinol on 5/3 as rehab will take 1 day to get it in stock Mr. Heart is an 86 year old gentleman with a PMH DM, HTN, HLD who has been followed for the past 6 months for foot wounds and leg pain. He underwent and angiogram with Dr. Quinn in september of 2021 and has been followed in the office. He reports having pain in the RLE mostly in the toes and has open wounds with some purulence and gangrene. The pain is worse at night when lying in bed, and improves with tylenol and dangling the foot off the bed. He is non-ambulatory at home.  He presents today from the office for preoperative planning and optimization for a RLE AKA on Monday 4/4 with Dr. Quinn    Pt admitted to vascular surgery. Cards/medicine/nephro consulted for pre op optimization. 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 The patient tolerated the operation well and there were no post-operative complications identified. Hep gtt restarted. 4/5 PT wound care evaluated/applied wound vac. Pulm consulted for acute hypoxic resp failure, recs followed- supp 02, ctx x 5 days, steroids, inhalers, acapella device. 4/7 sicu consulted for  HD monitoring in setting of severe COPD exacerbation requiring escalation of O2 supplementation with HFNC.   4/8  Started on HFNC for desaturation on the floor, diuresed with Lasix 20mg x1, then 80mg x1, hudson placed for strict I/O monitoring , Started on insulin gtt; Endo also consulted  4/9 Metolazone and losartan d/c'd due to nephrotoxicity iso worsening KARLOS   4/10 Weaned from HFNC to 3L NC, 1u pRBC for H/H 6.9/23.5  4/11 Failed trial of void x3, hudson catheter reinserted  4/14 patient was noted to have left sided facial droop, otherwise neuro exam at baseline per family (left sided mild weakness), given being on heparin infusion, it was stopped and emergent CT of head was performed which showed no ICH, and no signs of acute ischemic stroke. This is likely recrudescence; Transitioned off insulin gtt, started on Admelog 12 and Lantus 30, Hyperglycemic overnight, extra 10U Lantus and 8U Admelog given,  Insulin gtt restarted 2/2 persistent hyperglycemia, increasing leukocytosis, BCx sent and started on zosyn. Left labial facial droop, CTH overnight negative. On eeg- No epileptiform patterns or seizures recorded.  4/15 d/c insulin gtt, now on ISS and lantus pre meal  4/16 transferred to floors  4/18 ID consulted as amp site with erythema and pt coughing w rhonchi and lower lobe infiltrates, rec cont zosyn; podiatry consulted-  left heel discoloration secondary to ischemic changes , cont with z flow boot in bed at all times, leave open to air at this time  4/19 evaluated by speech- rec fees (had some coughing/wheezing when eating)  4/20- family had goals of care discussion about swallowing difficulty, does not wish to proceed with a PEG tube at this time. They are aware of risks of aspiration, such as aspiration pneumonia or death. Will start patient on pureed diet, moderately thickened liquids, per speech and swallow recommendations  4/22 sp OR for RLE completion BKA  4/25 failed tov, straight cath'd x3  4/26 sp 1u prbc, hudson reinserted  4/27 RRT called for AMS; pt found to be hypoglycemic to 53 mg/dl. Per RN, pt had received premeal insulin however did not eat much lunch. Vitals wnl on 2l NC. Pt arousable w/sternal rub. Pt sounds congested by breathing on 2L NC. RLE stump w/some dark fringes around erythematous base, no drainage. Labs notable for troponin of 204, .4. EKG w/o ischemic changes. Gave amp of D50 with improvement of glucose to 217 mg/dl. RRT ended.   Around 1.5hrs later, a further RRT was called for AMS. Pt arousable again to  sternal rub. POC glucose in 100's. ABG drawn, unremarkable. CXR ordered, no signs of pulm edema or pleural effusion. No FND. Pt responsive however AOx1, (baseline AOx2). CKMB and Myoglobin sent. RRT ended, per discussion w/fellow and attending, will obtain CT head non con, Abx, aspiration precautions, chest pt, and ICU consult; pt transferred to SICU for closer monitoring  4/29  Metoprolol decreased from 50mg to 25mg ER daily, Naproxen discontinued to preserve kidney function  4/30 Mental status improving, Per Cards ok to transition to coumadin or Eliquis AND aspirin, Hyperglycemic-> started on Lantus and Abx now administered with NS    Transferred to floors. Heparin gtt dcd when inr therapeutic and resumed home coumadin dosing. Steroids tapered off. Abx 7 days augementin.     PT evaluated pt and recd keisha. PMR recd keisha. Pt to be dcd with chronic hudson. R knee immobilizer in place, daily dressing changes to stump.    CT 5/3 with likely PNA. Discussed with pulmonary. OK to discharge on 7 days Augmentin.     Patient continued to recover appropriately and was deemed stable for dc. At the time of discharge, the patient was hemodynamically stable, tolerating PO diet, passing stool, and was comfortable with adequate pain control. The patient was instructed to follow up with Dr. Quinn, jamari, optho, podiatry, nephrology and PMD within 1-2 weeks after discharge.     ok to skip 6 pm dose of robinol on 5/3 and 6 am dose on 5/4 as rehab will take 1 day to get it in stock

## 2022-05-02 NOTE — PROGRESS NOTE ADULT - SUBJECTIVE AND OBJECTIVE BOX
[facility] - ORESTES Wall / ORESTES Wynne / KARIE Melendez/ ORESTES Knight/ ORESTES Jang/ ADRIA Lee / TOÑITO Mora / ROBB Kapoor  ---------------------------------------------------------------------------------------------------------------  seen and examined today for KARLOS  Interval : serum creatinine improving  VITALS:  T(F): 98.1 (05-02-22 @ 09:32), Max: 98.7 (05-01-22 @ 21:15)  HR: 74 (05-02-22 @ 09:32)  BP: 115/61 (05-02-22 @ 09:32)  RR: 18 (05-02-22 @ 09:32)  SpO2: 94% (05-02-22 @ 09:32)  Wt(kg): --    05-01 @ 07:01  -  05-02 @ 07:00  --------------------------------------------------------  IN: 946 mL / OUT: 1900 mL / NET: -954 mL    05-02 @ 07:01  -  05-02 @ 13:57  --------------------------------------------------------  IN: 440 mL / OUT: 600 mL / NET: -160 mL      Physical Exam :-  Constitutional: NAD  Neck: Supple.  Respiratory: Bilateral equal breath sounds,  Cardiovascular: S1, S2 normal,  Gastrointestinal: Bowel Sounds present, soft, non tender.  Extremities: No edema  Neurological: Alert and Oriented  Psychiatric: Normal mood, normal affect  Data:-  Allergies :   No Known Allergies    Hospital Medications:   MEDICATIONS  (STANDING):  acetaminophen     Tablet .. 975 milliGRAM(s) Oral every 6 hours  albuterol/ipratropium for Nebulization 3 milliLiter(s) Nebulizer every 6 hours  atorvastatin 40 milliGRAM(s) Oral at bedtime  buDESOnide    Inhalation Suspension 0.5 milliGRAM(s) Inhalation every 12 hours  finasteride 5 milliGRAM(s) Oral daily  furosemide    Tablet 20 milliGRAM(s) Oral two times a day  gabapentin 100 milliGRAM(s) Oral every 8 hours  glycopyrrolate 1 milliGRAM(s) Oral two times a day  guaiFENesin ER 1200 milliGRAM(s) Oral every 12 hours  insulin glargine Injectable (LANTUS) 12 Unit(s) SubCutaneous at bedtime  insulin lispro (ADMELOG) corrective regimen sliding scale   SubCutaneous three times a day before meals  insulin lispro (ADMELOG) corrective regimen sliding scale   SubCutaneous at bedtime  insulin lispro Injectable (ADMELOG) 2 Unit(s) SubCutaneous three times a day before meals  levothyroxine 25 MICROGram(s) Oral daily  metoprolol succinate ER 25 milliGRAM(s) Oral daily  montelukast 10 milliGRAM(s) Oral daily  multivitamin/minerals 1 Tablet(s) Oral daily  pantoprazole    Tablet 40 milliGRAM(s) Oral before breakfast  polyethylene glycol 3350 17 Gram(s) Oral daily  senna 2 Tablet(s) Oral at bedtime  tamsulosin 0.8 milliGRAM(s) Oral at bedtime    05-02    137  |  104  |  24<H>  ----------------------------<  158<H>  4.0   |  20<L>  |  1.01    Ca    8.3<L>      02 May 2022 07:25  Phos  2.6     05-02  Mg     1.8     05-02      Creatinine Trend: 1.01 <--, 1.01 <--, 1.10 <--, 1.27 <--, 0.93 <--, 0.98 <--, 0.99 <--, 1.10 <--                        7.7    5.64  )-----------( 127      ( 02 May 2022 07:16 )             25.5

## 2022-05-02 NOTE — PROGRESS NOTE ADULT - ASSESSMENT
ASSESSMENT:    86 year old gentleman, former smoker, followed by Dr. Alicja Rob of our practice for asthma/COPD overlap  syndrome and obstructive sleep apnea being treated conservatively. He has no history of TOM colonization/infection as listed in the "past medical history". He has been stable from a pulmonary perspective and maintained on budesonide and duoneb once daily and if needed. He also has a history of HTN, HLD, DM, CKD, CVA, CHF (mild systolic dysfunction) and atrial fibrillation with sick sinus syndrome s/p pacemaker implantation. He has been followed for many months for chronic foot wounds and leg pain now with some purulence and gangrene. The pain is exacerbated when laying in bed and improves with tylenol and when hanging the legs off of the bed. He is no longer able to ambulate. 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 The patient has developed marked shortness of breath with severe hypoxemia currently requiring a nasal canula @ 5lpm to maintain saturation @ 90%. He has a cough with copious mucous in his chest which he is unable to expectorate. He has chest congestion and wheeze. No fevers, chills or sweats. No chest pain/pressure or palpitations. Called by the patient's family and asked to be involved with his pulmonary care.    acute hypoxic respiratory failure    1) severe COPD exacerbation -> slowly improving but exacerbated by ongoing aspiration  2) 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 and respiratory muscle weakness limiting diaphragmatic excursion and chest wall expansion -> bibasilar atelectasis has improved on repeat CXR  3) no evidence of pulmonary edema or pneumonia  4) 4/28 -> possible aspiration pneumonia    leukocytosis -> resolved    steroid induced hyperglycemia -> resolved    CKD/KARLOS due to diuresis in the setting of euvolemia -> improved    4/14 - remains in the ICU; continues on an insulin infusion for steroid induced hyperglycemia; worsening of his mental status and chronic left sided weakness and left facial droop after analgesics prior to the VAC change yesterday; CT scan and EEG are unremarkable; started on zosyn for possible "sepsis"; now awake and alert sitting in the chair and back to his baseline mental status; no shortness of breath or hypoxemia on room air; occasional cough productive of scant sputum; minimal chest congestion and wheeze; no fevers, chills or sweats; no chest pain/pressure or palpitations; CXR now has bibasilar atelectasis - there is no evidence of pulmonary edema; on heparin gtt for PAD    4/15 - transferred to the surgical floor; mental status is back to baseline; left facial droop and left sided weakness are no worse than usual; continues on zosyn for possible "sepsis"; awake and alert sitting in the chair; no shortness of breath or hypoxemia on room air; occasional cough productive of scant sputum; minimal chest congestion and wheeze; no fevers, chills or sweats; no chest pain/pressure or palpitations; CXR is with a small left pleural effusion and bibasilar atelectasis - there is no evidence of pulmonary edema; on heparin gtt for PAD    4/20 -  left arm swelling ->no evidence of DVT on Duplex; FEEST revealed severe dysphagia -> non oral nutrition recommended -> the family has elected to continue oral feeding understanding the risks of aspiration; mental status is back to baseline; left facial droop and left sided weakness are no worse than usual; continues on zosyn for possible "sepsis"; awake and alert sitting in the chair; no shortness of breath or hypoxemia on room air; occasional cough productive of scant sputum; mild chest congestion and wheeze especially after eating; no fevers, chills or sweats; no chest pain/pressure or palpitations; CXR reveals improved bibasilar atelectasis - there is no evidence of pulmonary edema; on heparin gtt for PAD    4/22 - NPO and off heparin gtt awaiting AKA; awake and alert sitting up in bed; no shortness of breath or hypoxemia on room air; occasional cough productive of scant sputum; chest congestion and wheeze iis much improved and exacerbated when eating; no fevers, chills or sweats; no chest pain/pressure or palpitations; decreased left arm swelling ->no evidence of DVT on Duplex    4/25 - 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; seems sedated on an increased dose of gabapentin and "as needed" ultram for ongoing leg pain; remains on a heparin gtt now being bridged to coumadin; no shortness of breath or hypoxemia on a 2lpm nasal canula; occasional weak cough productive of scant sputum; chest congestion and wheeze is much improved and exacerbated when eating;    4/28 -  confusion yesterday initially due to hypoglycemia off steroids -> improved after glucose -> insulin adjusted; a second episode of altered mental followed with normal glucose -> started on antibiotics due to concern of a stump infection and transferred to the ICU for observation; now more awake and alert but not back to baseline mental status sitting in the chair; 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 -> significant right leg pain continues now off narcotics and gabapentin due to confusion; no shortness of breath or hypoxemia on a 2lpm nasal canula; occasional weak cough productive of scant sputum; persistent chest congestion and wheeze exacerbated when eating; no fevers, chills or sweats; no chest pain/pressure or palpitations; FEEST revealed severe dysphagia -> non oral nutrition recommended -> the family has elected to continue oral feeding understanding the risks of aspiration (small bites and easy to chew diet with moderately thickened fluids ordered); left facial droop and left sided weakness are at baseline); received 1 unit PRBCs 4/26 for post-operative anemia; hudson remains in place due to urinary retention    5/2 - up in bed; much more awake and alert; continues on antibiotics due to concern of a stump infection and aspiration pneumonia; 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 -> right leg pain is improved; no shortness of breath or hypoxemia on room air; occasional weak cough productive of scant sputum; persistent chest congestion and wheeze exacerbated when eating; no fevers, chills or sweats; no chest pain/pressure or palpitations; FEEST revealed severe dysphagia -> non oral nutrition recommended -> the family has elected to continue oral feeding understanding the risks of aspiration (small bites and easy to chew diet with moderately thickened fluids ordered); left facial droop and left sided weakness are at baseline); hudson remains in place due to urinary retention    PLAN/RECOMMENDATIONS:    stable oxygenation on room air  has completed a steroid taper - glucose control is improved  albuterol/atrovent nebs q6h  pulmicort 0.5mg nebs q12h - give after duoneb - rinse mouth after use  mucinex 1200mg 2 times daily  singulair 10mg @ bedtime  glycopyrrolate 1mg 2 times - watch for tachycardia, extreme dryness, urinary retention, etc  acapella device/incentive spirometer  discontinue zosyn - blood cultures are negative - MRSA nasal swab is negative - vancomycin discontinued  cardiac meds: lipitor/toprol XL/lasix - still holding cozaar and metolazone  flomax/proscar -> hudson replaced due to urinary retention  GI prophylaxis - protonix  bowel regimen  glucose control    speech and swallow evaluation noted - severe dysphagia - NPO with non-oral feeding recommended - I explained the ongoing risk of aspiration with possible pneumonia, worsening bronchospasm, hypoxemia, respiratory failure etc to the patient and family - they do not want placement of a NGT or PEG and wish to continue an oral diet - written for a bite sized and easy to chew diet with moderately thickened fluids - small bites - no straws - chin tuck maneuver explained and demonstrated    EEG -> mild to moderate nonspecific diffuse or multifocal cerebral dysfunction -  no epileptiform patterns or seizures recorded.  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, stable in appearance compared with prior dated 9/8/2019  CXR 4/18 - improving bilateral lower lobe opacities (atelectasis)  vascular surgery follow-up    INR is therapeutic on coumadin - heparin discontinued    analgesics - gabapentin/oxycodone    s/p completion BKA    wound care  out of bed and into the chair  LUE Duplex is without DVT    Will follow with you. Plan of care discussed with the patient and his family at bedside and with the vascular team. Discharge planning.    Sarabjit Wright MD, [facility]  [phone]  Pulmonary Medicine

## 2022-05-02 NOTE — PROGRESS NOTE ADULT - SUBJECTIVE AND OBJECTIVE BOX
[facility] - PROGRESS NOTE    CHIEF COMPLAINT: acute hypoxic respiratory failure; COPD exacerbation; mucous plugging; atelectasis; weak cough; pleural effusion; dysphagia; right foot gangrene s/p BKA    INTERVAL HISTORY: up in bed; much more awake and alert; continues on antibiotics due to concern of a stump infection and aspiration pneumonia; s/p completion right BKA  -> right leg pain is improved; no shortness of breath or hypoxemia on room air; occasional weak cough productive of scant sputum; persistent chest congestion and wheeze exacerbated when eating; no fevers, chills or sweats; no chest pain/pressure or palpitations; FEEST revealed severe dysphagia -> non oral nutrition recommended -> the family has elected to continue oral feeding understanding the risks of aspiration (small bites and easy to chew diet with moderately thickened fluids ordered); left facial droop and left sided weakness are at baseline); hudson remains in place due to urinary retention      REVIEW OF SYSTEMS:  Constitutional: As per interval history  HEENT: Within normal limits  CV: As per interval history  Resp: As per interval history  GI: dysphagia  : Within normal limits  Musculoskeletal: Within normal limits  Skin: Within normal limits  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 - left facial droop - dysphagia - confusion  Psychiatric: Within normal limits  Endocrine: diabetes -> hyperglycemia -> symptomatic hypoglycemia  Hematologic/Lymphatic: Within normal limits  Allergic/Immunologic: Within normal limits    MEDICATIONS:     Pulmonary "  albuterol/ipratropium for Nebulization 3 milliLiter(s) Nebulizer every 6 hours  buDESOnide    Inhalation Suspension 0.5 milliGRAM(s) Inhalation every 12 hours  guaiFENesin ER 1200 milliGRAM(s) Oral every 12 hours  montelukast 10 milliGRAM(s) Oral daily    Anti-microbials:    Cardiovascular:  furosemide    Tablet 20 milliGRAM(s) Oral two times a day  metoprolol succinate ER 25 milliGRAM(s) Oral daily  tamsulosin 0.8 milliGRAM(s) Oral at bedtime    Other:  acetaminophen     Tablet .. 975 milliGRAM(s) Oral every 6 hours  atorvastatin 40 milliGRAM(s) Oral at bedtime  finasteride 5 milliGRAM(s) Oral daily  gabapentin 100 milliGRAM(s) Oral every 8 hours  glycopyrrolate 1 milliGRAM(s) Oral two times a day  insulin glargine Injectable (LANTUS) 12 Unit(s) SubCutaneous at bedtime  insulin lispro (ADMELOG) corrective regimen sliding scale   SubCutaneous three times a day before meals  insulin lispro (ADMELOG) corrective regimen sliding scale   SubCutaneous at bedtime  insulin lispro Injectable (ADMELOG) 2 Unit(s) SubCutaneous three times a day before meals  levothyroxine 25 MICROGram(s) Oral daily  multivitamin/minerals 1 Tablet(s) Oral daily  pantoprazole    Tablet 40 milliGRAM(s) Oral before breakfast  polyethylene glycol 3350 17 Gram(s) Oral daily  senna 2 Tablet(s) Oral at bedtime    MEDICATIONS  (PRN):  acetaminophen   IVPB .. 1000 milliGRAM(s) IV Intermittent once PRN Mild Pain (1 - 3)  oxyCODONE    IR 5 milliGRAM(s) Oral every 6 hours PRN Severe Pain (7 - 10)    OBJECTIVE:    POCT Blood Glucose.: 217 mg/dL (02 May 2022 11:47)  POCT Blood Glucose.: 214 mg/dL (02 May 2022 08:45)  POCT Blood Glucose.: 234 mg/dL (01 May 2022 22:18)  POCT Blood Glucose.: 211 mg/dL (01 May 2022 18:33)  POCT Blood Glucose.: 179 mg/dL (01 May 2022 14:02)      PHYSICAL EXAM:       ICU Vital Signs Last 24 Hrs  T(C): 36.7 (02 May 2022 09:32), Max: 37.1 (01 May 2022 21:15)  T(F): 98.1 (02 May 2022 09:32), Max: 98.7 (01 May 2022 21:15)  HR: 74 (02 May 2022 09:32) (74 - 85)  BP: 115/61 (02 May 2022 09:32) (115/61 - 159/67)  BP(mean): --  ABP: --  ABP(mean): --  RR: 18 (02 May 2022 09:32) (18 - 18)  SpO2: 94% (02 May 2022 09:32) (94% - 95%) on room air    General: Awake. Alert. Much less confused. Cooperative. No distress. Appears stated age. Obese.   HEENT: Atraumatic. Normocephalic. Anicteric. Normal oral mucosa. PERRL. EOMI.  Neck: Supple. Trachea midline. Thyroid without enlargement/tenderness/nodules. No carotid bruit. No JVD.	  Cardiovascular: Regular rate and rhythm. Distant S1 S2. No murmurs, rubs or gallops.  Respiratory: Respirations unlabored. Bilateral rhonchi and wheeze. No curvature.  Abdomen: Soft. Non-tender. Non-distended. No organomegaly. No masses. Normal bowel sounds.  Extremities: R BKA stump with operative dressing in place that is c/d/i - per vascular -> no strikethrough noted - stump appears swollen, erythematous and discolored along the stump line - staples are intact with out any purulent or serosanguinous discharge - knee immobilizer in place. Decreased swelling and pitting edema of the left upper extremity  Pulses: Decreased peripheral pulses LLE  Skin: Venous stasis changes left lower extremity  Lymph Nodes: Cervical, supraclavicular and axillary nodes normal  Neurological: Left sided weakness left > arm. Left facial droop. A and O x 1  Psychiatry: Appropriate mood and affect.    LABS:                          7.7    5.64  )-----------( 127      ( 02 May 2022 07:16 )             25.5     CBC    WBC  5.64 <==, 5.90 <==, 7.69 <==, 9.32 <==, 9.29 <==, 8.42 <==, 10.71 <==    Hemoglobin  7.7 <<==, 7.7 <<==, 7.7 <<==, 7.5 <<==, 8.5 <<==, 7.8 <<==, 8.2 <<==    Hematocrit  25.5 <==, 26.1 <==, 24.9 <==, 24.5 <==, 28.7 <==, 26.4 <==, 26.3 <==    Platelets  127 <==, 106 <==, 96 <==, 105 <==, 118 <==, 104 <==, 127 <==      137  |  104  |  24<H>  ----------------------------<  158<H>    05-02  4.0   |  20<L>  |  1.01      LYTES    sodium  137 <==, 141 <==, 138 <==, 141 <==, 145 <==, 144 <==, 142 <==    potassium   4.0 <==, 3.7 <==, 3.9 <==, 4.0 <==, 3.7 <==, 3.7 <==, 3.9 <==    chloride  104 <==, 105 <==, 102 <==, 104 <==, 106 <==, 106 <==, 105 <==    carbon dioxide  20 <==, 23 <==, 22 <==, 22 <==, 26 <==, 26 <==, 26 <==    =============================================================================================  RENAL FUNCTION:    Creatinine:   1.01  <<==, 1.01  <<==, 1.10  <<==, 1.27  <<==, 0.93  <<==, 0.98  <<==, 0.99  <<==    BUN:   24 <==, 30 <==, 38 <==, 42 <==, 34 <==, 39 <==, 39 <==    ============================================================================================    calcium   8.3 <==, 8.4 <==, 8.3 <==, 8.2 <==, 8.9 <==, 8.4 <==, 8.5 <==    phos   2.6 <==, 3.0 <==, 3.5 <==, 3.0 <==, 3.7 <==, 3.6 <==, 2.8 <==    mag   1.8 <==, 2.0 <==, 2.1 <==, 1.8 <==, 2.0 <==, 2.0 <==, 2.0 <==    ============================================================================================  LFTs    AST:   9 <==     ALT:  14  <==     AP:  55  <=    Bili:  0.5  <=    PT/INR - ( 02 May 2022 07:10 )   PT: 30.8 sec;   INR: 2.63 ratio      PTT - ( 02 May 2022 07:10 )  PTT:82.0 sec    Venous Blood Gas:   @ 03:32  7.39/52/38/32/61.8  VBG Lactate: 1.0    ABG - ( 2022 19:45 )  pH: 7.48  /  pCO2: 41    /  pO2: 106   / HCO3: 30    / Base Excess: 6.4   /  SaO2: 99.3      Venous Blood Gas:   @ 22:10  7.40/41/52/25/84.4  VBG Lactate: 1.8    Venous Blood Gas:   @ 22:23  7.40/39/45/24/78.1  VBG Lactate: 2.4    Venous Blood Gas:   @ 22:23  7.40/39/45/24/78.1  VBG Lactate: 2.4    Venous Blood Gas:  04-10 @ 23:19  7.39/42/44/25/72.2  VBG Lactate: 2.5      Procalcitonin, Serum: 0.37 ng/mL ( @ 00:36)  Procalcitonin, Serum: 0.33 ng/mL ( @ 03:35)      CARDIAC MARKERS ( 2022 20:08 )  CPK x     /CKMB x     /CKMB Units 2.9 ng/mL    troponin x        CARDIAC MARKERS ( 2022 18:27 )  CPK x     /CKMB x     /CKMB Units x        troponin 204 ng/L    < from: TTE with Doppler (w/Cont) (22 @ 15:07) >    Patient name: Martir Heart  YOB: 1935   Age: 86 (M)   MR#: [id number]  Study Date: 4/3/2022  Location: [address] [place], [place] [id number]: Angie Olivarez MAGO  Study quality: Technically difficult  Referring Physician: Anmol Quinn MD  BloodPressure: 115/70 mmHg  Height: 173 cm  Weight: 92 kg  BSA: 2.1 m2  ------------------------------------------------------------------------  PROCEDURE: Transthoracic echocardiogram with 2-D, M-Mode  and complete spectral and color flow Doppler. Verbal  consent was obtained for injection of  Ultrasonic Enhancing  Agent following a discussion of risks and benefits.  Following intravenous injection of Ultrasonic Enhancing  Agent, harmonic imaging was performed.  INDICATION: Cardiomyopathy, unspecified (I42.9)  ------------------------------------------------------------------------  Dimensions:    Normal Values:  LA:     3.1    2.0 - 4.0 cm  Ao:     3.5    2.0 - 3.8 cm  SEPTUM: 1.1    0.6 - 1.2 cm  PWT:    1.3    0.6 - 1.1 cm  LVIDd:  4.3    3.0- 5.6 cm  LVIDs:  3.2    1.8 - 4.0 cm  Derived variables:  LVMI: 90 g/m2  RWT: 0.60  Fractional short: 26 %  EF (Visual Estimate): NWV %  Doppler Peak Velocity (m/sec): MV=1.6 AoV=1.4  ------------------------------------------------------------------------  Conclusions:  1. Aortic valve not well visualized; appears calcified.  Peak transaortic valve gradient equals 8 mm Hg, mean  transaortic valve gradient equals 3 mm Hg, estimated aortic  valve area equals 2 sqcm (by continuity equation), aortic  valve velocity time integral equals 22 cm, consistent with  mild aortic stenosis.  2. Endocardial visualization enhanced with intravenous  injection of Ultrasonic Enhancing Agent (Definity). Mild  left ventricular systolic dysfunction. The inferior wall,  and the inferoseptum are hypokinetic.  3. The right ventricle is not well visualized; grossly  normal right ventricular systolic function.  ------------------------------------------------------------------------  Confirmed on  4/3/2022 - 17:12:14 by BRITTANY Giraldo  ------------------------------------------------------------------------  -----------------------------------------------------------  MICROBIOLOGY:     COVID-19 PCR . (22 @ 18:23)   COVID-19 PCR: NotDetec:     Urinalysis Basic - ( 2022 02:47 )    Color: Yellow / Appearance: Clear / S.021 / pH: x  Gluc: x / Ketone: Negative  / Bili: Negative / Urobili: Negative   Blood: x / Protein: 30 mg/dL / Nitrite: Negative   Leuk Esterase: Negative / RBC: 12 /hpf / WBC 2 /HPF   Sq Epi: x / Non Sq Epi: 0 /hpf / Bacteria: Negative    Culture - Blood (22 @ 09:14)   Specimen Source: .Blood Blood-Peripheral   Culture Results:   No growth to date.     Culture - Blood (22 @ 09:14)   Specimen Source: .Blood Blood-Peripheral   Culture Results:   No growth to date.     RADIOLOGY:  [x] Chest radiographs reviewed and interpreted by me    EXAM:  XR CHEST PORTABLE URGENT 1V                          PROCEDURE DATE:  2022      FINDINGS:    The cardiomediastinal silhouette is not well evaluated in this   projection. Thoracic aortic calcifications. 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  New patchy right basilar opacities.  Worsening patchy left perihilar opacities, most pronounced in the mid to   lower lung.  No right pleural effusion.  There is continued left basilar and retrocardiac opacity.  No pneumothorax.  No acute bony abnormality.    IMPRESSION:  New patchy right basilar opacities which could be due to subsegmental   atelectasis or pneumonia.    Worsening patchy left perihilar opacities, most pronounced in the mid to   lower lung, possibly asymmetric pulmonary edema, atelectasis, and/or   pneumonia.    Continued left basilar and retrocardiac opacity which may be due to a   left pleural effusion with passive atelectasis, atelectasis of other   cause, and/or pneumonia.    DUSTY ZUÑIGA DO;Resident Radiologist  This document has been electronically signed.  IGLESIA DEL ROSARIO MD; Attending Radiologist  This document has been electronically signed. 2022  1:32PM  ---------------------------------------------------------------------------------------------------------------  EXAM:  CT BRAIN                          PROCEDURE DATE:  2022      FINDINGS:    Redemonstration of right cerebellar hemisphere, right greater than left   occipital, and high left posterior frontal chronic infarcts    Similar low density left lateral convexity subdural collection measuring   1 cm in greatest depth.    No midline shift or effacement of basal cisterns. No hydrocephalus.    No acute intracranial hemorrhage or brain edema. Moderate white matter   microvascular ischemic disease.    IMPRESSION:    No hydrocephalus, acute intracranial hemorrhage, mass effect, or brain   edema.    Similar low density left lateral convexity subdural collection measuring   1 cm in greatest depth.    No midline shift or effacement of basal cisterns. No hydrocephalus.    Chronic right cerebellar hemisphere, right greater than left occipital,   and high left posterior frontal infarcts.    KENA CASAS MD; Attending Radiologist  This document has been electronically signed. 2022  9:00AM  ---------------------------------------------------------------------------------------------------------------  EXAM:  DUPLEX EXT VEINS UPPER LT                          PROCEDURE DATE:  2022      IMPRESSION:  No evidence of left upper extremity deep venous thrombosis.    NARCISO MUÑOZ MD; Attending Radiologist  This document has been electronically signed. 2022  8:48PM  ---------------------------------------------------------------------------------------------------------------  EXAM:  XR CHEST PORTABLE URGENT 1V                          PROCEDURE DATE:  2022      FINDINGS:  Left chest wall pacemaker.  The heart size cannot be accurately evaluated on this projection.  Bilateral lower lung hazy opacities, reduced since 2022.  There is no pneumothorax.    IMPRESSION:  Partial clearing of the bases and left effusion.    ANITA INFANTE MD; Resident Radiologist  This document has been electronically signed.  HORACIO HELMS MD; Attending Interventional Radiologist  Thisdocument has been electronically signed. 2022  3:57PM  --------------------------------------------------------------------------------------------------------------

## 2022-05-02 NOTE — PROGRESS NOTE ADULT - NSPROGADDITIONALINFOA_GEN_ALL_CORE
I reviewed the overnight course of events on the unit, re-confirming the patient history. I discussed the care with the patient and their family. The plan of care was discussed with the ACP team and modifications were made to the notation where appropriate. Differential diagnosis and plan of care discussed with patient after the evaluation. Advanced care planning was discussed with patient and family.  Advanced care planning forms were reviewed and discussed.  Risks, benefits and alternatives of cardiac procedures were discussed in detail and all questions were answered. 35 minutes spent on total encounter of which more than fifty percent of the encounter was spent counseling and/or coordinating care by the attending physician.
26 minutes spent on the development of plan of care/coordination of care/glycemic control through review of labs, blood glucose values and vital signs.
26 minutes spent on the development of plan of care/coordination of care/glycemic control through review of labs, blood glucose values and vital signs.
-Plan discussed with pt/team.  Contact info: [phone] (24/7). pager [phone]  [email or web address] password [email or web address]  Spent  over 25 minutes assessing pt/labs/meds and discussing plan of care with primary team  Adjusting insulin  Discharge plan  Follow up care

## 2022-05-02 NOTE — DISCHARGE NOTE PROVIDER - NSDCFUADDAPPT_GEN_ALL_CORE_FT
YOUR PRIMARY CARE DOCTOR--Please call for a follow up appointment in the next 1-2 weeks regarding your recent surgery and hospitalization; your primary doctor can help coordinate a follow up appointment with ophthalmology as recommended by endocrine     Please call to schedule a follow up appointment with endocrinology after discharge    Please call to schedule a follow up appointment with podiatry after discharge; as per podiatry re: left heel discoloration: continue with z flow boot in bed at all times, leave open to air

## 2022-05-02 NOTE — DISCHARGE NOTE PROVIDER - NSDCCPCAREPLAN_GEN_ALL_CORE_FT
PRINCIPAL DISCHARGE DIAGNOSIS  Diagnosis: Gangrene of right foot  Assessment and Plan of Treatment: status post surgical intervention        SECONDARY DISCHARGE DIAGNOSES  Diagnosis: Uncontrolled type 2 diabetes mellitus with hyperglycemia  Assessment and Plan of Treatment: carbohydrate consistent diet  continue diabetic medications as per discharge medication list  please call to schedule an outpatient follow up appointment with endocrinology after discharge    Diagnosis: Essential hypertension  Assessment and Plan of Treatment:     Diagnosis: Hypothyroidism  Assessment and Plan of Treatment: repeat thyroid functin tests as outpatient    Diagnosis: Acute kidney injury superimposed on CKD  Assessment and Plan of Treatment: please call to schedule an outpatient follow up appointment with nephrology after discharge    Diagnosis: Chronic systolic heart failure  Assessment and Plan of Treatment:     Diagnosis: Atrial fibrillation  Assessment and Plan of Treatment:

## 2022-05-02 NOTE — PROGRESS NOTE ADULT - PROBLEM SELECTOR PLAN 3
C/w atorvastatin 40 milliGRAM(s) Oral at bedtime  -F/u levels as out pt
C/w atorvastatin 40 milliGRAM(s) Oral at bedtime  -F/u levels as out pt.
on statin.

## 2022-05-02 NOTE — PROGRESS NOTE ADULT - ASSESSMENT
86M with PMH of COPD (not on home o2), prior smoking history (40 pack years), HTN, HLD, Afib, CHF, T2DM, CVA, BPH, and PAD admitted for elective RLE AKA. Renal consulted for CKD Mx.    KARLOS on CKD 3,   serum k stable  bicarb stable    Plan  cont monitor Serum Creatinine   Edema present, on lasix 20 IV BID  off losartan  Advised to hold lasix for now in light of decreasing blood pressure   monitor BMP daily and u/o   dose all meds for eGFR  avoid NSAIDs/Nephrotoxics.    Rt LE nonhealing wound:  follow up with vascular  S/P BKA      For any question, call:  Cell # [phone]  Pager # [phone]  Callback # [phone]

## 2022-05-02 NOTE — DISCHARGE NOTE PROVIDER - NSFOLLOWUPCLINICS_GEN_ALL_ED_FT
[facility] End[facility]  Endocrinology  [address] [place] [address]  Phone: [phone]  Fax:

## 2022-05-02 NOTE — DISCHARGE NOTE PROVIDER - NSDCMRMEDTOKEN_GEN_ALL_CORE_FT
acetaminophen 325 mg oral tablet: 2 tab(s) orally every 6 hours, As needed, Mild Pain (1 - 3)  aspirin 81 mg oral tablet, chewable: 1 tab(s) orally once a day  budesonide 0.5 mg/2 mL inhalation suspension: 0.5 milligram(s) inhaled 2 times a day  Centrum Silver oral tablet: 1 tab(s) orally once a day  Coumadin 5 mg oral tablet: 1 tab(s) orally once a day (at bedtime)  Crestor 10 mg oral tablet: 1 tab(s) orally once a day (at bedtime)  finasteride 5 mg oral tablet: 1 tab(s) orally once a day  furosemide 20 mg oral tablet: 1 tab(s) orally 2 times a day    gabapentin 100 mg oral tablet: 1 tab(s) orally 2 times a day  glipiZIDE 10 mg oral tablet: 1 tab(s) orally 2 times a day  insulin glargine: 30 unit(s) subcutaneous once a day (at bedtime)  metformin 500 mg oral tablet: 1 tab(s) orally 2 times a day  metOLazone 2.5 mg oral tablet: 1 tab(s) orally 3 times a week  metoprolol succinate 25 mg oral capsule, extended release: 1 cap(s) orally once a day  montelukast 10 mg oral tablet: 1 tab(s) orally once a day  omeprazole 40 mg oral delayed release capsule: 1 cap(s) orally once a day  Synthroid 25 mcg (0.025 mg) oral tablet: 1 tab(s) orally once a day  tamsulosin 0.4 mg oral capsule: 2 cap(s) orally once a day (at bedtime)  telmisartan 80 mg oral tablet: 1 tab(s) orally once a day  traMADol 50 mg oral tablet: 1 tab(s) orally every 6 hours, As Needed   acetaminophen 325 mg oral tablet: 2 tab(s) orally every 6 hours, As needed, Mild Pain (1 - 3)  amoxicillin-clavulanate 875 mg-125 mg oral tablet: 1 tab(s) orally 2 times a day  end date 5/10  aspirin 81 mg oral tablet, chewable: 1 tab(s) orally once a day  budesonide 0.5 mg/2 mL inhalation suspension: 0.5 milligram(s) inhaled 2 times a day  Coumadin 5 mg oral tablet: 1 tab(s) orally once a day (at bedtime)    held 5/3 given INR elvated. Please check PT/INR in rehab  Crestor 10 mg oral tablet: 1 tab(s) orally once a day (at bedtime)  finasteride 5 mg oral tablet: 1 tab(s) orally once a day  furosemide 20 mg oral tablet: 1 tab(s) orally 2 times a day    gabapentin 100 mg oral tablet: 1 tab(s) orally 2 times a day  glycopyrrolate 1 mg oral tablet: 1 tab(s) orally 2 times a day  insulin glargine: 30 unit(s) subcutaneous once a day (at bedtime)  ipratropium-albuterol 0.5 mg-2.5 mg/3 mL inhalation solution: 3 milliliter(s) inhaled every 6 hours  metoprolol succinate 25 mg oral tablet, extended release: 1 tab(s) orally once a day  montelukast 10 mg oral tablet: 1 tab(s) orally once a day  Multiple Vitamins with Minerals oral tablet: 1 tab(s) orally once a day  omeprazole 40 mg oral delayed release capsule: 1 cap(s) orally once a day  oxyCODONE 5 mg oral tablet: 1 tab(s) orally every 6 hours, As needed, Severe Pain (7 - 10)  polyethylene glycol 3350 oral powder for reconstitution: 17 gram(s) orally once a day  senna oral tablet: 2 tab(s) orally once a day (at bedtime)  Synthroid 25 mcg (0.025 mg) oral tablet: 1 tab(s) orally once a day  tamsulosin 0.4 mg oral capsule: 2 cap(s) orally once a day (at bedtime)  telmisartan 80 mg oral tablet: 1 tab(s) orally once a day  traMADol 50 mg oral tablet: 1 tab(s) orally every 6 hours, As Needed

## 2022-05-02 NOTE — DISCHARGE NOTE PROVIDER - NSDCFUSCHEDAPPT_GEN_ALL_CORE_FT
[facility]  Cardio Electro [id number]  Scheduled Appointment: 06/10/2022    Evelio Patel  [facility]  Cardio [address] [facility]  Scheduled Appointment: 06/17/2022    [facility]  Cardio [address] [facility]  Scheduled Appointment: 06/17/2022

## 2022-05-02 NOTE — PROGRESS NOTE ADULT - SUBJECTIVE AND OBJECTIVE BOX
Diabetes Follow up note:    Chief complaint: T2DM    Interval Hx: BG values 170-200s over past 24 hours. Pt seen at bedside. Reports good appetite and ate most of his breakfast this AM.     Review of Systems:  General: denies pain  GI: Tolerating POs. Denies N/V/D/Abd pain  CV: Denies CP/SOB  ENDO: No S&Sx of hypoglycemia    MEDS:  atorvastatin 40 milliGRAM(s) Oral at bedtime  finasteride 5 milliGRAM(s) Oral daily  insulin glargine Injectable (LANTUS) 10 Unit(s) SubCutaneous at bedtime  insulin lispro (ADMELOG) corrective regimen sliding scale   SubCutaneous three times a day before meals  insulin lispro (ADMELOG) corrective regimen sliding scale   SubCutaneous at bedtime  insulin lispro Injectable (ADMELOG) 1 Unit(s) SubCutaneous three times a day before meals  levothyroxine 25 MICROGram(s) Oral daily    piperacillin/tazobactam IVPB.. 3.375 Gram(s) IV Intermittent every 8 hours    Allergies    No Known Allergies        PE:  General: Male lying in bed. NAD>   Vital Signs Last 24 Hrs  T(C): 36.7 (02 May 2022 09:32), Max: 37.1 (01 May 2022 21:15)  T(F): 98.1 (02 May 2022 09:32), Max: 98.7 (01 May 2022 21:15)  HR: 74 (02 May 2022 09:32) (74 - 85)  BP: 115/61 (02 May 2022 09:32) (115/61 - 159/67)  BP(mean): --  RR: 18 (02 May 2022 09:32) (18 - 18)  SpO2: 94% (02 May 2022 09:32) (94% - 96%)  Abd: Soft, NT,ND,   : Adair catheter in place.   Extremities: Warm. R BKA stump w/dsg c/d/i  Neuro: A&O X3    LABS:  POCT Blood Glucose.: 214 mg/dL (05-02-22 @ 08:45)  POCT Blood Glucose.: 234 mg/dL (05-01-22 @ 22:18)  POCT Blood Glucose.: 211 mg/dL (05-01-22 @ 18:33)  POCT Blood Glucose.: 179 mg/dL (05-01-22 @ 14:02)  POCT Blood Glucose.: 152 mg/dL (05-01-22 @ 09:43)  POCT Blood Glucose.: 196 mg/dL (04-30-22 @ 22:07)  POCT Blood Glucose.: 208 mg/dL (04-30-22 @ 17:40)  POCT Blood Glucose.: 195 mg/dL (04-30-22 @ 14:48)  POCT Blood Glucose.: 157 mg/dL (04-30-22 @ 09:20)  POCT Blood Glucose.: 242 mg/dL (04-29-22 @ 21:42)  POCT Blood Glucose.: 133 mg/dL (04-29-22 @ 17:40)  POCT Blood Glucose.: 230 mg/dL (04-29-22 @ 11:54)                            7.7    5.64  )-----------( 127      ( 02 May 2022 07:16 )             25.5       05-02    137  |  104  |  24<H>  ----------------------------<  158<H>  4.0   |  20<L>  |  1.01    Ca    8.3<L>      02 May 2022 07:25  Phos  2.6     05-02  Mg     1.8     05-02        A1C with Estimated Average Glucose Result: 6.8 % (04-02-22 @ 12:21)  A1C with Estimated Average Glucose Result: 7.4 % (09-03-21 @ 19:03)          Contact number: daily [phone] or [phone]

## 2022-05-02 NOTE — PROGRESS NOTE ADULT - ASSESSMENT
86 y/o M w/ uncontrolled Type 2 DM complicated by neuropathy and nephropathy with hyperglycemia exacerbated by steroids> off steroids now. Also HTN, HLD, hypothyroidism admitted for R BKA completion 4/22. On 4/27 he had 2 rapids called on him and was transferred to the SICU for monitoring. He is now stable and doing better and back on floors. Tolerating POs. BG values above goal on current basal/bolus regimen. BG goal (100-200mg/dl).

## 2022-05-02 NOTE — PROGRESS NOTE ADULT - PROBLEM SELECTOR PROBLEM 4
Hypothyroidism

## 2022-05-02 NOTE — DISCHARGE NOTE PROVIDER - PROVIDER TOKENS
PROVIDER:[TOKEN:[[id number]],FOLLOWUP:[1 week]],PROVIDER:[TOKEN:[[id number]],FOLLOWUP:[1 week]],PROVIDER:[TOKEN:[[id number]],FOLLOWUP:[1 week]],PROVIDER:[TOKEN:[[id number]],FOLLOWUP:[1 week]]

## 2022-05-02 NOTE — PROGRESS NOTE ADULT - PROBLEM SELECTOR PLAN 4
-C/w levothyroxine 25 MICROGram(s) Oral daily    Repeat TFTs as outpatient  -Pt still receiving synthroid at same time as protonix & other medications which alters synthroid absorption  please ensure pt is receiving synthroid 1 hour prior to all other medications on an empty stomach   -repeat TFT as outpatient
-C/w levothyroxine 25 MICROGram(s) Oral daily    Repeat TFTs as outpatient  -synthroid received w/ other medications this am but 1 hour prior to protonix, synthroid should be given by itself 1 hour prior to any medications  avoid administering protonix and synthroid together as protonix will alter absorption of synthroid    Can be reached via TEAMS/pager: [phone]   office:  [phone] (M-F 9a-5pm)               [phone] (nights/weekends)   [email or web address] password [email or web address]
continue levothyroxine. Repeat TFTs as outpatient.      Can be reached via TEAMS/pager: [phone]   office:  [phone] (M-F 9a-5pm)               [phone] (nights/weekends)   [email or web address] password [email or web address]

## 2022-05-02 NOTE — PROGRESS NOTE ADULT - PROBLEM SELECTOR PLAN 1
-test BG AC/HS  -Decrease Lantus 36 units QHS  -Adjust Admelog 13-10-10 w/meals  -c/w Admelog moderate correction scale AC and mod HS scale  -cons carb diet  -Prednisone taper ongoing  Outpt. endo follow-up  Outpt. optho, podiatry, nephrology  Plan to d/c on basal + orals including SGLT2 given CKD. Would avoid metformin and sulfonylurea.
-test BG AC/HS  -Increase Lantus 12 units QHS  -Increase Admelog 2 units TID w/meals. Will increase further if BG above goal at lunch/dinner today  -c/w Admelog low correction scale AC and low HS scale  -cons carb  Discharge:   Likely can continue w/basal/bolus vs basal + oral regimen on discharge to [facility].   Outpt. endo follow-up  Outpt. optho, podiatry, nephrology.
-test BG AC/HS  -c/w Admelog low dose correction scale AC and HS scale  -Will add basal/bolus if needed  -Please change Zosyn infusion to NS instead of D5  Discharge:   Will depend on PO intake/bg levels and insulin needs. Dex going to rehab  Outpt. endo follow-up  Outpt. optho, podiatry, nephrology

## 2022-05-02 NOTE — PROGRESS NOTE ADULT - PROBLEM SELECTOR PLAN 2
Goal BP <140/90, on metoprolol
Goal BP <140/90, on metoprolol.  Manage per ICU team
Goal BP <140/90, on metoprolol.  Manage per primary team.

## 2022-05-02 NOTE — PROGRESS NOTE ADULT - NUTRITIONAL ASSESSMENT
Diet, Soft and Bite Sized:   Consistent Carbohydrate {Evening Snack} (CSTCHOSN)  Moderately Thick Liquids (MODTHICKLIQS) (04-22-22 @ 15:21) [Active]
Diet, Regular:   Consistent Carbohydrate {Evening Snack} (CSTCHOSN)  Moderately Thick Liquids (MODTHICKLIQS) (04-14-22 @ 12:36) [Active]
Diet, Regular:   Consistent Carbohydrate {Evening Snack} (CSTCHOSN)  Moderately Thick Liquids (MODTHICKLIQS) (04-14-22 @ 12:36) [Active]
Diet, Soft and Bite Sized:   Consistent Carbohydrate {Evening Snack} (CSTCHOSN)  Moderately Thick Liquids (MODTHICKLIQS) (04-22-22 @ 15:21) [Active]      Please see RD assessment and/or follow up.  Managed by primary team as well
Diet, NPO after Midnight:      NPO Start Date: 21-Apr-2022,   NPO Start Time: 23:59  Except Medications (04-21-22 @ 05:41) [Active]  Diet, Soft and Bite Sized:   Moderately Thick Liquids (MODTHICKLIQS) (04-20-22 @ 15:32) [Active]
Diet, NPO after Midnight:      NPO Start Date: 21-Apr-2022,   NPO Start Time: 23:59  Except Medications (04-21-22 @ 05:41) [Active]  Diet, Soft and Bite Sized:   Moderately Thick Liquids (MODTHICKLIQS) (04-20-22 @ 15:32) [Active]
Diet, Soft and Bite Sized:   Consistent Carbohydrate {Evening Snack} (CSTCHOSN)  Moderately Thick Liquids (MODTHICKLIQS) (04-22-22 @ 15:21) [Active]
Diet, Soft and Bite Sized:   Consistent Carbohydrate {Evening Snack} (CSTCHOSN)  Moderately Thick Liquids (MODTHICKLIQS) (04-22-22 @ 15:21) [Active]

## 2022-05-02 NOTE — PROGRESS NOTE ADULT - PROBLEM SELECTOR PROBLEM 1
Uncontrolled type 2 diabetes mellitus with hyperglycemia

## 2022-05-03 NOTE — PROGRESS NOTE ADULT - SUBJECTIVE AND OBJECTIVE BOX
[facility] - PROGRESS NOTE    CHIEF COMPLAINT: acute hypoxic respiratory failure; COPD exacerbation; mucous plugging; atelectasis; weak cough; pleural effusion; dysphagia; right foot gangrene s/p BKA    INTERVAL HISTORY: antibiotics restarted due to concerns about a stump infection and possible aspiration pneumonia; up in bed; awake and alert; s/p completion right BKA  -> right leg pain is improved; no shortness of breath or hypoxemia on room air; occasional weak cough productive of scant sputum; persistent chest congestion and wheeze exacerbated when eating; no fevers, chills or sweats; no chest pain/pressure or palpitations; FEEST revealed severe dysphagia -> non oral nutrition recommended -> the family has elected to continue oral feeding understanding the risks of aspiration (small bites and easy to chew diet with moderately thickened fluids ordered); left facial droop and left sided weakness are at baseline); hudson remains in place due to urinary retention      REVIEW OF SYSTEMS:  Constitutional: As per interval history  HEENT: Within normal limits  CV: As per interval history  Resp: As per interval history  GI: dysphagia  : Within normal limits  Musculoskeletal: Within normal limits  Skin: Within normal limits  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 3.375 Gram(s) IV Intermittent every 8 hours    Cardiovascular:  furosemide    Tablet 20 milliGRAM(s) Oral two times a day  metoprolol succinate ER 25 milliGRAM(s) Oral daily  tamsulosin 0.8 milliGRAM(s) Oral at bedtime    Other:  acetaminophen     Tablet .. 975 milliGRAM(s) Oral every 6 hours  atorvastatin 40 milliGRAM(s) Oral at bedtime  finasteride 5 milliGRAM(s) Oral daily  gabapentin 100 milliGRAM(s) Oral every 8 hours  glycopyrrolate 1 milliGRAM(s) Oral two times a day  insulin glargine Injectable (LANTUS) 12 Unit(s) SubCutaneous at bedtime  insulin lispro (ADMELOG) corrective regimen sliding scale   SubCutaneous three times a day before meals  insulin lispro (ADMELOG) corrective regimen sliding scale   SubCutaneous at bedtime  insulin lispro Injectable (ADMELOG) 2 Unit(s) SubCutaneous three times a day before meals  levothyroxine 25 MICROGram(s) Oral daily  multivitamin/minerals 1 Tablet(s) Oral daily  pantoprazole    Tablet 40 milliGRAM(s) Oral before breakfast  polyethylene glycol 3350 17 Gram(s) Oral daily  senna 2 Tablet(s) Oral at bedtime    MEDICATIONS  (PRN):  acetaminophen   IVPB .. 1000 milliGRAM(s) IV Intermittent once PRN Mild Pain (1 - 3)  oxyCODONE    IR 5 milliGRAM(s) Oral every 6 hours PRN Severe Pain (7 - 10)      OBJECTIVE:    POCT Blood Glucose.: 196 mg/dL (02 May 2022 21:37)  POCT Blood Glucose.: 134 mg/dL (02 May 2022 17:38)  POCT Blood Glucose.: 217 mg/dL (02 May 2022 11:47)  POCT Blood Glucose.: 214 mg/dL (02 May 2022 08:45)      PHYSICAL EXAM:       ICU Vital Signs Last 24 Hrs  T(C): 37 (03 May 2022 05:07), Max: 37.2 (02 May 2022 21:09)  T(F): 98.6 (03 May 2022 05:07), Max: 99 (02 May 2022 21:09)  HR: 90 (03 May 2022 05:07) (74 - 90)  BP: 133/66 (03 May 2022 05:07) (115/61 - 138/60)  BP(mean): --  ABP: --  ABP(mean): --  RR: 18 (03 May 2022 05:07) (18 - 18)  SpO2: 93% (03 May 2022 05:07) (92% - 98%) on room air     General: Awake. Alert. Slow mentation. Cooperative. No distress. Appears stated age. Obese.   HEENT: Atraumatic. Normocephalic. Anicteric. Normal oral mucosa. PERRL. EOMI.  Neck: Supple. Trachea midline. Thyroid without enlargement/tenderness/nodules. No carotid bruit. No JVD.	  Cardiovascular: Regular rate and rhythm. Distant S1 S2. No murmurs, rubs or gallops.  Respiratory: Respirations unlabored. Bilateral rhonchi and wheeze. No curvature.  Abdomen: Soft. Non-tender. Non-distended. No organomegaly. No masses. Normal bowel sounds.  Extremities: R BKA stump with operative dressing in place that is c/d/i - per vascular -> no strikethrough noted - stump appears swollen, erythematous and discolored along the stump line - staples are intact with out any purulent or serosanguinous discharge - knee immobilizer in place. Decreased swelling and pitting edema of the left upper extremity  Pulses: Decreased peripheral pulses LLE  Skin: Venous stasis changes left lower extremity  Lymph Nodes: Cervical, supraclavicular and axillary nodes normal  Neurological: Left sided weakness left > arm. Left facial droop. A and O x 3  Psychiatry: Appropriate mood and affect.    LABS:                          8.1    5.42  )-----------( 185      ( 03 May 2022 06:49 )             27.0     CBC    WBC  5.42 <==, 5.64 <==, 5.90 <==, 7.69 <==, 9.32 <==, 9.29 <==, 8.42 <==    Hemoglobin  8.1 <<==, 7.7 <<==, 7.7 <<==, 7.7 <<==, 7.5 <<==, 8.5 <<==, 7.8 <<==    Hematocrit  27.0 <==, 25.5 <==, 26.1 <==, 24.9 <==, 24.5 <==, 28.7 <==, 26.4 <==    Platelets  185 <==, 127 <==, 106 <==, 96 <==, 105 <==, 118 <==, 104 <==      137  |  104  |  24<H>  ----------------------------<  158<H>    05-02  4.0   |  20<L>  |  1.01      LYTES    sodium  137 <==, 141 <==, 138 <==, 141 <==, 145 <==, 144 <==, 142 <==    potassium   4.0 <==, 3.7 <==, 3.9 <==, 4.0 <==, 3.7 <==, 3.7 <==, 3.9 <==    chloride  104 <==, 105 <==, 102 <==, 104 <==, 106 <==, 106 <==, 105 <==    carbon dioxide  20 <==, 23 <==, 22 <==, 22 <==, 26 <==, 26 <==, 26 <==    =============================================================================================  RENAL FUNCTION:    Creatinine:   1.01  <<==, 1.01  <<==, 1.10  <<==, 1.27  <<==, 0.93  <<==, 0.98  <<==, 0.99  <<==    BUN:   24 <==, 30 <==, 38 <==, 42 <==, 34 <==, 39 <==, 39 <==    ============================================================================================    calcium   8.3 <==, 8.4 <==, 8.3 <==, 8.2 <==, 8.9 <==, 8.4 <==, 8.5 <==    phos   2.6 <==, 3.0 <==, 3.5 <==, 3.0 <==, 3.7 <==, 3.6 <==, 2.8 <==    mag   1.8 <==, 2.0 <==, 2.1 <==, 1.8 <==, 2.0 <==, 2.0 <==, 2.0 <==    ============================================================================================  LFTs    AST:   9 <==     ALT:  14  <==     AP:  55  <=    Bili:  0.5  <=    PT/INR - ( 03 May 2022 06:49 )   PT: 46.3 sec;   INR: 3.97 ratio       PTT - ( 03 May 2022 06:49 )  PTT:40.7 sec    Venous Blood Gas:   @ 03:32  7.39/52/38/32/61.8  VBG Lactate: 1.0    ABG - ( 2022 19:45 )  pH: 7.48  /  pCO2: 41    /  pO2: 106   / HCO3: 30    / Base Excess: 6.4   /  SaO2: 99.3      Venous Blood Gas:   @ 22:10  7.40/41/52/25/84.4  VBG Lactate: 1.8    Venous Blood Gas:   @ 22:23  7.40/39/45/24/78.1  VBG Lactate: 2.4    Venous Blood Gas:   @ 22:23  7.40/39/45/24/78.1  VBG Lactate: 2.4    Venous Blood Gas:  04-10 @ 23:19  7.39/42/44/25/72.2  VBG Lactate: 2.5      Procalcitonin, Serum: 0.37 ng/mL ( @ 00:36)  Procalcitonin, Serum: 0.33 ng/mL ( @ 03:35)    < from: TTE with Doppler (w/Cont) (22 @ 15:07) >    Patient name: Martir Heart  YOB: 1935   Age: 86 (M)   MR#: [id number]  Study Date: 4/3/2022  Location: [address] [place], [place] [id number]: [facility]  Study quality: Technically difficult  Referring Physician: Anmol Quinn MD  BloodPressure: 115/70 mmHg  Height: 173 cm  Weight: 92 kg  BSA: 2.1 m2  ------------------------------------------------------------------------  PROCEDURE: Transthoracic echocardiogram with 2-D, M-Mode  and complete spectral and color flow Doppler. Verbal  consent was obtained for injection of  Ultrasonic Enhancing  Agent following a discussion of risks and benefits.  Following intravenous injection of Ultrasonic Enhancing  Agent, harmonic imaging was performed.  INDICATION: Cardiomyopathy, unspecified (I42.9)  ------------------------------------------------------------------------  Dimensions:    Normal Values:  LA:     3.1    2.0 - 4.0 cm  Ao:     3.5    2.0 - 3.8 cm  SEPTUM: 1.1    0.6 - 1.2 cm  PWT:    1.3    0.6 - 1.1 cm  LVIDd:  4.3    3.0- 5.6 cm  LVIDs:  3.2    1.8 - 4.0 cm  Derived variables:  LVMI: 90 g/m2  RWT: 0.60  Fractional short: 26 %  EF (Visual Estimate): NWV %  Doppler Peak Velocity (m/sec): MV=1.6 AoV=1.4  ------------------------------------------------------------------------  Conclusions:  1. Aortic valve not well visualized; appears calcified.  Peak transaortic valve gradient equals 8 mm Hg, mean  transaortic valve gradient equals 3 mm Hg, estimated aortic  valve area equals 2 sqcm (by continuity equation), aortic  valve velocity time integral equals 22 cm, consistent with  mild aortic stenosis.  2. Endocardial visualization enhanced with intravenous  injection of Ultrasonic Enhancing Agent (Definity). Mild  left ventricular systolic dysfunction. The inferior wall,  and the inferoseptum are hypokinetic.  3. The right ventricle is not well visualized; grossly  normal right ventricular systolic function.  ------------------------------------------------------------------------  Confirmed on  4/3/2022 - 17:12:14 by BRITTANY Giraldo  ------------------------------------------------------------------------  -----------------------------------------------------------  MICROBIOLOGY:     COVID-19 PCR . (22 @ 18:23)   COVID-19 PCR: NotDetec:     Urinalysis Basic - ( 2022 02:47 )    Color: Yellow / Appearance: Clear / S.021 / pH: x  Gluc: x / Ketone: Negative  / Bili: Negative / Urobili: Negative   Blood: x / Protein: 30 mg/dL / Nitrite: Negative   Leuk Esterase: Negative / RBC: 12 /hpf / WBC 2 /HPF   Sq Epi: x / Non Sq Epi: 0 /hpf / Bacteria: Negative    Culture - Blood (22 @ 09:14)   Specimen Source: .Blood Blood-Peripheral   Culture Results:   No growth to date.     Culture - Blood (22 @ 09:14)   Specimen Source: .Blood Blood-Peripheral   Culture Results:   No growth to date.     RADIOLOGY:  [x] Chest radiographs reviewed and interpreted by me    EXAM:  XR CHEST PORTABLE URGENT 1V                          PROCEDURE DATE:  2022      FINDINGS:    The cardiomediastinal silhouette is not well evaluated in this   projection. Thoracic aortic calcifications. 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  New patchy right basilar opacities.  Worsening patchy left perihilar opacities, most pronounced in the mid to   lower lung.  No right pleural effusion.  There is continued left basilar and retrocardiac opacity.  No pneumothorax.  No acute bony abnormality.    IMPRESSION:  New patchy right basilar opacities which could be due to subsegmental   atelectasis or pneumonia.    Worsening patchy left perihilar opacities, most pronounced in the mid to   lower lung, possibly asymmetric pulmonary edema, atelectasis, and/or   pneumonia.    Continued left basilar and retrocardiac opacity which may be due to a   left pleural effusion with passive atelectasis, atelectasis of other   cause, and/or pneumonia.    DUSTY ZUÑIGA DO;Resident Radiologist  This document has been electronically signed.  IGLESIA DEL ROSARIO MD; Attending Radiologist  This document has been electronically signed. 2022  1:32PM  ---------------------------------------------------------------------------------------------------------------  EXAM:  CT BRAIN                          PROCEDURE DATE:  2022      FINDINGS:    Redemonstration of right cerebellar hemisphere, right greater than left   occipital, and high left posterior frontal chronic infarcts    Similar low density left lateral convexity subdural collection measuring   1 cm in greatest depth.    No midline shift or effacement of basal cisterns. No hydrocephalus.    No acute intracranial hemorrhage or brain edema. Moderate white matter   microvascular ischemic disease.    IMPRESSION:    No hydrocephalus, acute intracranial hemorrhage, mass effect, or brain   edema.    Similar low density left lateral convexity subdural collection measuring   1 cm in greatest depth.    No midline shift or effacement of basal cisterns. No hydrocephalus.    Chronic right cerebellar hemisphere, right greater than left occipital,   and high left posterior frontal infarcts.    KENA CASAS MD; Attending Radiologist  This document has been electronically signed. 2022  9:00AM  ---------------------------------------------------------------------------------------------------------------  EXAM:  DUPLEX EXT VEINS UPPER LT                          PROCEDURE DATE:  2022      IMPRESSION:  No evidence of left upper extremity deep venous thrombosis.    NARCISO MUÑOZ MD; Attending Radiologist  This document has been electronically signed. 2022  8:48PM  ---------------------------------------------------------------------------------------------------------------  EXAM:  XR CHEST PORTABLE URGENT 1V                          PROCEDURE DATE:  2022      FINDINGS:  Left chest wall pacemaker.  The heart size cannot be accurately evaluated on this projection.  Bilateral lower lung hazy opacities, reduced since 2022.  There is no pneumothorax.    IMPRESSION:  Partial clearing of the bases and left effusion.    ANITA INFANTE MD; Resident Radiologist  This document has been electronically signed.  HORACIO HELMS MD; Attending Interventional Radiologist  Thisdocument has been electronically signed. 2022  3:57PM  --------------------------------------------------------------------------------------------------------------       [facility] - PROGRESS NOTE    CHIEF COMPLAINT: acute hypoxic respiratory failure; COPD exacerbation; mucous plugging; atelectasis; weak cough; pleural effusion; dysphagia; right foot gangrene s/p BKA    INTERVAL HISTORY: antibiotics restarted due to concerns about a stump infection and possible aspiration pneumonia; up in bed; awake and alert; s/p completion right BKA  -> right leg pain is improved; no shortness of breath or hypoxemia on room air; occasional weak cough productive of scant sputum; persistent chest congestion and wheeze exacerbated when eating; no fevers, chills or sweats; no chest pain/pressure or palpitations; FEEST revealed severe dysphagia -> non oral nutrition recommended -> the family has elected to continue oral feeding understanding the risks of aspiration (small bites and easy to chew diet with moderately thickened fluids ordered); left facial droop and left sided weakness are at baseline); hudson remains in place due to urinary retention      REVIEW OF SYSTEMS:  Constitutional: As per interval history  HEENT: Within normal limits  CV: As per interval history  Resp: As per interval history  GI: dysphagia  : Within normal limits  Musculoskeletal: Within normal limits  Skin: Within normal limits  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 3.375 Gram(s) IV Intermittent every 8 hours    Cardiovascular:  furosemide    Tablet 20 milliGRAM(s) Oral two times a day  metoprolol succinate ER 25 milliGRAM(s) Oral daily  tamsulosin 0.8 milliGRAM(s) Oral at bedtime    Other:  acetaminophen     Tablet .. 975 milliGRAM(s) Oral every 6 hours  atorvastatin 40 milliGRAM(s) Oral at bedtime  finasteride 5 milliGRAM(s) Oral daily  gabapentin 100 milliGRAM(s) Oral every 8 hours  glycopyrrolate 1 milliGRAM(s) Oral two times a day  insulin glargine Injectable (LANTUS) 12 Unit(s) SubCutaneous at bedtime  insulin lispro (ADMELOG) corrective regimen sliding scale   SubCutaneous three times a day before meals  insulin lispro (ADMELOG) corrective regimen sliding scale   SubCutaneous at bedtime  insulin lispro Injectable (ADMELOG) 2 Unit(s) SubCutaneous three times a day before meals  levothyroxine 25 MICROGram(s) Oral daily  multivitamin/minerals 1 Tablet(s) Oral daily  pantoprazole    Tablet 40 milliGRAM(s) Oral before breakfast  polyethylene glycol 3350 17 Gram(s) Oral daily  senna 2 Tablet(s) Oral at bedtime    MEDICATIONS  (PRN):  acetaminophen   IVPB .. 1000 milliGRAM(s) IV Intermittent once PRN Mild Pain (1 - 3)  oxyCODONE    IR 5 milliGRAM(s) Oral every 6 hours PRN Severe Pain (7 - 10)      OBJECTIVE:    POCT Blood Glucose.: 196 mg/dL (02 May 2022 21:37)  POCT Blood Glucose.: 134 mg/dL (02 May 2022 17:38)  POCT Blood Glucose.: 217 mg/dL (02 May 2022 11:47)  POCT Blood Glucose.: 214 mg/dL (02 May 2022 08:45)      PHYSICAL EXAM:       ICU Vital Signs Last 24 Hrs  T(C): 37 (03 May 2022 05:07), Max: 37.2 (02 May 2022 21:09)  T(F): 98.6 (03 May 2022 05:07), Max: 99 (02 May 2022 21:09)  HR: 90 (03 May 2022 05:07) (74 - 90)  BP: 133/66 (03 May 2022 05:07) (115/61 - 138/60)  BP(mean): --  ABP: --  ABP(mean): --  RR: 18 (03 May 2022 05:07) (18 - 18)  SpO2: 93% (03 May 2022 05:07) (92% - 98%) on room air     General: Awake. Alert. Slow mentation. Cooperative. No distress. Appears stated age. Obese.   HEENT: Atraumatic. Normocephalic. Anicteric. Normal oral mucosa. PERRL. EOMI.  Neck: Supple. Trachea midline. Thyroid without enlargement/tenderness/nodules. No carotid bruit. No JVD.	  Cardiovascular: Regular rate and rhythm. Distant S1 S2. No murmurs, rubs or gallops.  Respiratory: Respirations unlabored. Bilateral rhonchi and wheeze. No curvature.  Abdomen: Soft. Non-tender. Non-distended. No organomegaly. No masses. Normal bowel sounds.  Extremities: R BKA stump with operative dressing in place that is c/d/i - per vascular -> no strikethrough noted - stump appears swollen, erythematous and discolored along the stump line - staples are intact with out any purulent or serosanguinous discharge - knee immobilizer in place. Decreased swelling and pitting edema of the left upper extremity  Pulses: Decreased peripheral pulses LLE  Skin: Venous stasis changes left lower extremity  Lymph Nodes: Cervical, supraclavicular and axillary nodes normal  Neurological: Left sided weakness left > arm. Left facial droop. A and O x 3  Psychiatry: Appropriate mood and affect.    LABS:                          8.1    5.42  )-----------( 185      ( 03 May 2022 06:49 )             27.0     CBC    WBC  5.42 <==, 5.64 <==, 5.90 <==, 7.69 <==, 9.32 <==, 9.29 <==, 8.42 <==    Hemoglobin  8.1 <<==, 7.7 <<==, 7.7 <<==, 7.7 <<==, 7.5 <<==, 8.5 <<==, 7.8 <<==    Hematocrit  27.0 <==, 25.5 <==, 26.1 <==, 24.9 <==, 24.5 <==, 28.7 <==, 26.4 <==    Platelets  185 <==, 127 <==, 106 <==, 96 <==, 105 <==, 118 <==, 104 <==      137  |  104  |  24<H>  ----------------------------<  158<H>    05-02  4.0   |  20<L>  |  1.01      LYTES    sodium  137 <==, 141 <==, 138 <==, 141 <==, 145 <==, 144 <==, 142 <==    potassium   4.0 <==, 3.7 <==, 3.9 <==, 4.0 <==, 3.7 <==, 3.7 <==, 3.9 <==    chloride  104 <==, 105 <==, 102 <==, 104 <==, 106 <==, 106 <==, 105 <==    carbon dioxide  20 <==, 23 <==, 22 <==, 22 <==, 26 <==, 26 <==, 26 <==    =============================================================================================  RENAL FUNCTION:    Creatinine:   1.01  <<==, 1.01  <<==, 1.10  <<==, 1.27  <<==, 0.93  <<==, 0.98  <<==, 0.99  <<==    BUN:   24 <==, 30 <==, 38 <==, 42 <==, 34 <==, 39 <==, 39 <==    ============================================================================================    calcium   8.3 <==, 8.4 <==, 8.3 <==, 8.2 <==, 8.9 <==, 8.4 <==, 8.5 <==    phos   2.6 <==, 3.0 <==, 3.5 <==, 3.0 <==, 3.7 <==, 3.6 <==, 2.8 <==    mag   1.8 <==, 2.0 <==, 2.1 <==, 1.8 <==, 2.0 <==, 2.0 <==, 2.0 <==    ============================================================================================  LFTs    AST:   9 <==     ALT:  14  <==     AP:  55  <=    Bili:  0.5  <=    PT/INR - ( 03 May 2022 06:49 )   PT: 46.3 sec;   INR: 3.97 ratio       PTT - ( 03 May 2022 06:49 )  PTT:40.7 sec    Venous Blood Gas:   @ 03:32  7.39/52/38/32/61.8  VBG Lactate: 1.0    ABG - ( 2022 19:45 )  pH: 7.48  /  pCO2: 41    /  pO2: 106   / HCO3: 30    / Base Excess: 6.4   /  SaO2: 99.3      Venous Blood Gas:   @ 22:10  7.40/41/52/25/84.4  VBG Lactate: 1.8    Venous Blood Gas:   @ 22:23  7.40/39/45/24/78.1  VBG Lactate: 2.4    Venous Blood Gas:   @ 22:23  7.40/39/45/24/78.1  VBG Lactate: 2.4    Venous Blood Gas:  04-10 @ 23:19  7.39/42/44/25/72.2  VBG Lactate: 2.5      Procalcitonin, Serum: 0.37 ng/mL ( @ 00:36)  Procalcitonin, Serum: 0.33 ng/mL ( @ 03:35)    < from: TTE with Doppler (w/Cont) (22 @ 15:07) >    Patient name: Martir Heart  YOB: 1935   Age: 86 (M)   MR#: [id number]  Study Date: 4/3/2022  Location: [address] [place], [place] [id number]: [facility]  Study quality: Technically difficult  Referring Physician: Anmol Quinn MD  BloodPressure: 115/70 mmHg  Height: 173 cm  Weight: 92 kg  BSA: 2.1 m2  ------------------------------------------------------------------------  PROCEDURE: Transthoracic echocardiogram with 2-D, M-Mode  and complete spectral and color flow Doppler. Verbal  consent was obtained for injection of  Ultrasonic Enhancing  Agent following a discussion of risks and benefits.  Following intravenous injection of Ultrasonic Enhancing  Agent, harmonic imaging was performed.  INDICATION: Cardiomyopathy, unspecified (I42.9)  ------------------------------------------------------------------------  Dimensions:    Normal Values:  LA:     3.1    2.0 - 4.0 cm  Ao:     3.5    2.0 - 3.8 cm  SEPTUM: 1.1    0.6 - 1.2 cm  PWT:    1.3    0.6 - 1.1 cm  LVIDd:  4.3    3.0- 5.6 cm  LVIDs:  3.2    1.8 - 4.0 cm  Derived variables:  LVMI: 90 g/m2  RWT: 0.60  Fractional short: 26 %  EF (Visual Estimate): NWV %  Doppler Peak Velocity (m/sec): MV=1.6 AoV=1.4  ------------------------------------------------------------------------  Conclusions:  1. Aortic valve not well visualized; appears calcified.  Peak transaortic valve gradient equals 8 mm Hg, mean  transaortic valve gradient equals 3 mm Hg, estimated aortic  valve area equals 2 sqcm (by continuity equation), aortic  valve velocity time integral equals 22 cm, consistent with  mild aortic stenosis.  2. Endocardial visualization enhanced with intravenous  injection of Ultrasonic Enhancing Agent (Definity). Mild  left ventricular systolic dysfunction. The inferior wall,  and the inferoseptum are hypokinetic.  3. The right ventricle is not well visualized; grossly  normal right ventricular systolic function.  ------------------------------------------------------------------------  Confirmed on  4/3/2022 - 17:12:14 by BRITTANY Giraldo  ------------------------------------------------------------------------  -----------------------------------------------------------  MICROBIOLOGY:     COVID-19 PCR . (22 @ 18:23)   COVID-19 PCR: NotDetec:     Urinalysis Basic - ( 2022 02:47 )    Color: Yellow / Appearance: Clear / S.021 / pH: x  Gluc: x / Ketone: Negative  / Bili: Negative / Urobili: Negative   Blood: x / Protein: 30 mg/dL / Nitrite: Negative   Leuk Esterase: Negative / RBC: 12 /hpf / WBC 2 /HPF   Sq Epi: x / Non Sq Epi: 0 /hpf / Bacteria: Negative    Culture - Blood (22 @ 09:14)   Specimen Source: .Blood Blood-Peripheral   Culture Results:   No growth to date.     Culture - Blood (22 @ 09:14)   Specimen Source: .Blood Blood-Peripheral   Culture Results:   No growth to date.     RADIOLOGY:  [x] Chest radiographs reviewed and interpreted by me    EXAM:  CT CHEST                          PROCEDURE DATE:  2022      FINDINGS:    LUNGS AND AIRWAYS: Emphysema. Impaction of right lower lobe and left   lower lobe bronchi with consolidative opacity of the posterior aspect of   the left lower lobe. Additional patchy and nodular opacities of within   right lower lobe, left upper lobe, and lingula. Punctate calcifications   in the right lower lobe.    PLEURA: Small left pleural effusion.  MEDIASTINUM AND MARK: No lymphadenopathy.  VESSELS: Calcifications involving the aorta, subclavian arteries, and   intra-abdominal visceral arteries  HEART: Heartsize is normal. Aortic valve calcifications. Coronary artery   calcifications. No pericardial effusion.  CHEST WALL AND LOWER NECK: Left chest wall dual-lead pacemaker with leads   in the right atrium and right ventricle.  VISUALIZED UPPER ABDOMEN: Within normal limits.  BONES: Multilevel degenerative changes of the spine. Old fracture   deformity of posterior left 9th rib.    IMPRESSION:    Bibasilar impacted airways and bilateral consolidations opacities likely   representing pneumonia.    Emphysema.    KENA CARRINGTON MD; Resident Radiologist  This document has been electronically signed.  KIMBERLEY BREWER MD; Attending Radiologist  This document has been electronically signed. May  3 2022 11:31AM  ---------------------------------------------------------------------------------------------------------------    EXAM:  XR CHEST PORTABLE URGENT 1V                          PROCEDURE DATE:  2022      FINDINGS:    The cardiomediastinal silhouette is not well evaluated in this   projection. Thoracic aortic calcifications. 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  New patchy right basilar opacities.  Worsening patchy left perihilar opacities, most pronounced in the mid to   lower lung.  No right pleural effusion.  There is continued left basilar and retrocardiac opacity.  No pneumothorax.  No acute bony abnormality.    IMPRESSION:  New patchy right basilar opacities which could be due to subsegmental   atelectasis or pneumonia.    Worsening patchy left perihilar opacities, most pronounced in the mid to   lower lung, possibly asymmetric pulmonary edema, atelectasis, and/or   pneumonia.    Continued left basilar and retrocardiac opacity which may be due to a   left pleural effusion with passive atelectasis, atelectasis of other   cause, and/or pneumonia.    DUSTY ZUÑIGA DO;Resident Radiologist  This document has been electronically signed.  IGLESIA DEL ROSARIO MD; Attending Radiologist  This document has been electronically signed. 2022  1:32PM  ---------------------------------------------------------------------------------------------------------------  EXAM:  CT BRAIN                          PROCEDURE DATE:  2022      FINDINGS:    Redemonstration of right cerebellar hemisphere, right greater than left   occipital, and high left posterior frontal chronic infarcts    Similar low density left lateral convexity subdural collection measuring   1 cm in greatest depth.    No midline shift or effacement of basal cisterns. No hydrocephalus.    No acute intracranial hemorrhage or brain edema. Moderate white matter   microvascular ischemic disease.    IMPRESSION:    No hydrocephalus, acute intracranial hemorrhage, mass effect, or brain   edema.    Similar low density left lateral convexity subdural collection measuring   1 cm in greatest depth.    No midline shift or effacement of basal cisterns. No hydrocephalus.    Chronic right cerebellar hemisphere, right greater than left occipital,   and high left posterior frontal infarcts.    KENA CASAS MD; Attending Radiologist  This document has been electronically signed. 2022  9:00AM  ---------------------------------------------------------------------------------------------------------------  EXAM:  DUPLEX EXT VEINS UPPER LT                          PROCEDURE DATE:  2022      IMPRESSION:  No evidence of left upper extremity deep venous thrombosis.    NARCISO MUÑOZ MD; Attending Radiologist  This document has been electronically signed. 2022  8:48PM  ---------------------------------------------------------------------------------------------------------------  EXAM:  XR CHEST PORTABLE URGENT 1V                          PROCEDURE DATE:  2022      FINDINGS:  Left chest wall pacemaker.  The heart size cannot be accurately evaluated on this projection.  Bilateral lower lung hazy opacities, reduced since 2022.  There is no pneumothorax.    IMPRESSION:  Partial clearing of the bases and left effusion.    ANITA INFANTE MD; Resident Radiologist  This document has been electronically signed.  HORACIO HELMS MD; Attending Interventional Radiologist  Thisdocument has been electronically signed. 2022  3:57PM  --------------------------------------------------------------------------------------------------------------

## 2022-05-03 NOTE — PROGRESS NOTE ADULT - PROVIDER SPECIALTY LIST ADULT
Cardiology
Infectious Disease
Internal Medicine
Nephrology
Pulmonology
Vascular Surgery
Vascular Surgery
Cardiology
Internal Medicine
Nephrology
Pulmonology
SICU
Vascular Surgery
Cardiology
Endocrinology
Infectious Disease
Infectious Disease
Internal Medicine
Nephrology
Podiatry
Pulmonology
SICU
Surgery
Vascular Surgery
Cardiology
Endocrinology
Infectious Disease
Infectious Disease
Internal Medicine
Nephrology
Pulmonology
SICU
Vascular Surgery
Cardiology
Endocrinology
Endocrinology
Cardiology
Cardiology
Endocrinology

## 2022-05-03 NOTE — PROGRESS NOTE ADULT - ASSESSMENT
ASSESSMENT:    86 year old gentleman, former smoker, followed by Dr. Alicja Rob of our practice for asthma/COPD overlap  syndrome and obstructive sleep apnea being treated conservatively. He has no history of TOM colonization/infection as listed in the "past medical history". He has been stable from a pulmonary perspective and maintained on budesonide and duoneb once daily and if needed. He also has a history of HTN, HLD, DM, CKD, CVA, CHF (mild systolic dysfunction) and atrial fibrillation with sick sinus syndrome s/p pacemaker implantation. He has been followed for many months for chronic foot wounds and leg pain now with some purulence and gangrene. The pain is exacerbated when laying in bed and improves with tylenol and when hanging the legs off of the bed. He is no longer able to ambulate. 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 The patient has developed marked shortness of breath with severe hypoxemia currently requiring a nasal canula @ 5lpm to maintain saturation @ 90%. He has a cough with copious mucous in his chest which he is unable to expectorate. He has chest congestion and wheeze. No fevers, chills or sweats. No chest pain/pressure or palpitations. Called by the patient's family and asked to be involved with his pulmonary care.    acute hypoxic respiratory failure -> resolved    1) severe COPD exacerbation -> slowly improving but exacerbated by ongoing aspiration  2) 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 and respiratory muscle weakness limiting diaphragmatic excursion and chest wall expansion -> bibasilar atelectasis has improved on repeat CXR  3) no evidence of pulmonary edema or pneumonia  4) 4/28 -> possible aspiration pneumonia    leukocytosis -> resolved    steroid induced hyperglycemia -> resolved    CKD/KARLOS due to diuresis in the setting of euvolemia -> improved    4/14 - remains in the ICU; continues on an insulin infusion for steroid induced hyperglycemia; worsening of his mental status and chronic left sided weakness and left facial droop after analgesics prior to the VAC change yesterday; CT scan and EEG are unremarkable; started on zosyn for possible "sepsis"; now awake and alert sitting in the chair and back to his baseline mental status; no shortness of breath or hypoxemia on room air; occasional cough productive of scant sputum; minimal chest congestion and wheeze; no fevers, chills or sweats; no chest pain/pressure or palpitations; CXR now has bibasilar atelectasis - there is no evidence of pulmonary edema; on heparin gtt for PAD    4/15 - transferred to the surgical floor; mental status is back to baseline; left facial droop and left sided weakness are no worse than usual; continues on zosyn for possible "sepsis"; awake and alert sitting in the chair; no shortness of breath or hypoxemia on room air; occasional cough productive of scant sputum; minimal chest congestion and wheeze; no fevers, chills or sweats; no chest pain/pressure or palpitations; CXR is with a small left pleural effusion and bibasilar atelectasis - there is no evidence of pulmonary edema; on heparin gtt for PAD    4/20 -  left arm swelling ->no evidence of DVT on Duplex; FEEST revealed severe dysphagia -> non oral nutrition recommended -> the family has elected to continue oral feeding understanding the risks of aspiration; mental status is back to baseline; left facial droop and left sided weakness are no worse than usual; continues on zosyn for possible "sepsis"; awake and alert sitting in the chair; no shortness of breath or hypoxemia on room air; occasional cough productive of scant sputum; mild chest congestion and wheeze especially after eating; no fevers, chills or sweats; no chest pain/pressure or palpitations; CXR reveals improved bibasilar atelectasis - there is no evidence of pulmonary edema; on heparin gtt for PAD    4/22 - NPO and off heparin gtt awaiting AKA; awake and alert sitting up in bed; no shortness of breath or hypoxemia on room air; occasional cough productive of scant sputum; chest congestion and wheeze iis much improved and exacerbated when eating; no fevers, chills or sweats; no chest pain/pressure or palpitations; decreased left arm swelling ->no evidence of DVT on Duplex    4/25 - 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; seems sedated on an increased dose of gabapentin and "as needed" ultram for ongoing leg pain; remains on a heparin gtt now being bridged to coumadin; no shortness of breath or hypoxemia on a 2lpm nasal canula; occasional weak cough productive of scant sputum; chest congestion and wheeze is much improved and exacerbated when eating;    4/28 -  confusion yesterday initially due to hypoglycemia off steroids -> improved after glucose -> insulin adjusted; a second episode of altered mental followed with normal glucose -> started on antibiotics due to concern of a stump infection and transferred to the ICU for observation; now more awake and alert but not back to baseline mental status sitting in the chair; 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 -> significant right leg pain continues now off narcotics and gabapentin due to confusion; no shortness of breath or hypoxemia on a 2lpm nasal canula; occasional weak cough productive of scant sputum; persistent chest congestion and wheeze exacerbated when eating; no fevers, chills or sweats; no chest pain/pressure or palpitations; FEEST revealed severe dysphagia -> non oral nutrition recommended -> the family has elected to continue oral feeding understanding the risks of aspiration (small bites and easy to chew diet with moderately thickened fluids ordered); left facial droop and left sided weakness are at baseline); received 1 unit PRBCs 4/26 for post-operative anemia; hudson remains in place due to urinary retention    5/2 - up in bed; much more awake and alert; continues on antibiotics due to concern of a stump infection and aspiration pneumonia; 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 -> right leg pain is improved; no shortness of breath or hypoxemia on room air; occasional weak cough productive of scant sputum; persistent chest congestion and wheeze exacerbated when eating; no fevers, chills or sweats; no chest pain/pressure or palpitations; FEEST revealed severe dysphagia -> non oral nutrition recommended -> the family has elected to continue oral feeding understanding the risks of aspiration (small bites and easy to chew diet with moderately thickened fluids ordered); left facial droop and left sided weakness are at baseline); hudson remains in place due to urinary retention    PLAN/RECOMMENDATIONS:    stable oxygenation on room air  continues on zosyn due to concerns regarding possible stump infection or aspiration pneumonia  CT chest to better evaluate for the presence or absence of pulmonary infection  has completed a steroid taper - glucose control is improved  albuterol/atrovent nebs q6h  pulmicort 0.5mg nebs q12h - give after duoneb - rinse mouth after use  mucinex 1200mg 2 times daily  singulair 10mg @ bedtime  glycopyrrolate 1mg 2 times - watch for tachycardia, extreme dryness, urinary retention, etc  acapella device/incentive spirometer  cardiac meds: lipitor/toprol XL/lasix - still holding cozaar and metolazone  flomax/proscar -> hudson replaced due to urinary retention  GI prophylaxis - protonix  bowel regimen  glucose control  vascular surgery follow-up    INR is supratherapeutic on coumadin     analgesics - gabapentin/oxycodone as needed/tylenol as needed    s/p completion BKA    wound care (?) stump infection  out of bed and into the chair    speech and swallow evaluation noted - severe dysphagia - NPO with non-oral feeding recommended - I explained the ongoing risk of aspiration with possible pneumonia, worsening bronchospasm, hypoxemia, respiratory failure etc to the patient and family - they do not want placement of a NGT or PEG and wish to continue an oral diet - written for a bite sized and easy to chew diet with moderately thickened fluids - small bites - no straws - chin tuck maneuver explained and demonstrated    EEG -> mild to moderate nonspecific diffuse or multifocal cerebral dysfunction -  no epileptiform patterns or seizures recorded.  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, stable in appearance compared with prior dated 9/8/2019  CXR 4/18 - improving bilateral lower lobe opacities (atelectasis)  LUE Duplex is without DVT    Will follow with you. Plan of care discussed with the patient and his family at bedside and with the vascular team. Discharge planning if infection is absent    Sarabjit Wright MD, [facility]  [phone]  Pulmonary Medicine   ASSESSMENT:    86 year old gentleman, former smoker, followed by Dr. Alicja Rob of our practice for asthma/COPD overlap  syndrome and obstructive sleep apnea being treated conservatively. He has no history of TOM colonization/infection as listed in the "past medical history". He has been stable from a pulmonary perspective and maintained on budesonide and duoneb once daily and if needed. He also has a history of HTN, HLD, DM, CKD, CVA, CHF (mild systolic dysfunction) and atrial fibrillation with sick sinus syndrome s/p pacemaker implantation. He has been followed for many months for chronic foot wounds and leg pain now with some purulence and gangrene. The pain is exacerbated when laying in bed and improves with tylenol and when hanging the legs off of the bed. He is no longer able to ambulate. 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 The patient has developed marked shortness of breath with severe hypoxemia currently requiring a nasal canula @ 5lpm to maintain saturation @ 90%. He has a cough with copious mucous in his chest which he is unable to expectorate. He has chest congestion and wheeze. No fevers, chills or sweats. No chest pain/pressure or palpitations. Called by the patient's family and asked to be involved with his pulmonary care.    acute hypoxic respiratory failure -> resolved    1) severe COPD exacerbation -> slowly improving but exacerbated by ongoing aspiration  2) 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 and respiratory muscle weakness limiting diaphragmatic excursion and chest wall expansion -> bibasilar atelectasis has improved on repeat CXR  3) no evidence of pulmonary edema or pneumonia  4) 4/28 -> possible aspiration pneumonia    leukocytosis -> resolved    steroid induced hyperglycemia -> resolved    CKD/KARLOS due to diuresis in the setting of euvolemia -> improved    4/14 - remains in the ICU; continues on an insulin infusion for steroid induced hyperglycemia; worsening of his mental status and chronic left sided weakness and left facial droop after analgesics prior to the VAC change yesterday; CT scan and EEG are unremarkable; started on zosyn for possible "sepsis"; now awake and alert sitting in the chair and back to his baseline mental status; no shortness of breath or hypoxemia on room air; occasional cough productive of scant sputum; minimal chest congestion and wheeze; no fevers, chills or sweats; no chest pain/pressure or palpitations; CXR now has bibasilar atelectasis - there is no evidence of pulmonary edema; on heparin gtt for PAD    4/15 - transferred to the surgical floor; mental status is back to baseline; left facial droop and left sided weakness are no worse than usual; continues on zosyn for possible "sepsis"; awake and alert sitting in the chair; no shortness of breath or hypoxemia on room air; occasional cough productive of scant sputum; minimal chest congestion and wheeze; no fevers, chills or sweats; no chest pain/pressure or palpitations; CXR is with a small left pleural effusion and bibasilar atelectasis - there is no evidence of pulmonary edema; on heparin gtt for PAD    4/20 -  left arm swelling ->no evidence of DVT on Duplex; FEEST revealed severe dysphagia -> non oral nutrition recommended -> the family has elected to continue oral feeding understanding the risks of aspiration; mental status is back to baseline; left facial droop and left sided weakness are no worse than usual; continues on zosyn for possible "sepsis"; awake and alert sitting in the chair; no shortness of breath or hypoxemia on room air; occasional cough productive of scant sputum; mild chest congestion and wheeze especially after eating; no fevers, chills or sweats; no chest pain/pressure or palpitations; CXR reveals improved bibasilar atelectasis - there is no evidence of pulmonary edema; on heparin gtt for PAD    4/22 - NPO and off heparin gtt awaiting AKA; awake and alert sitting up in bed; no shortness of breath or hypoxemia on room air; occasional cough productive of scant sputum; chest congestion and wheeze iis much improved and exacerbated when eating; no fevers, chills or sweats; no chest pain/pressure or palpitations; decreased left arm swelling ->no evidence of DVT on Duplex    4/25 - 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; seems sedated on an increased dose of gabapentin and "as needed" ultram for ongoing leg pain; remains on a heparin gtt now being bridged to coumadin; no shortness of breath or hypoxemia on a 2lpm nasal canula; occasional weak cough productive of scant sputum; chest congestion and wheeze is much improved and exacerbated when eating;    4/28 -  confusion yesterday initially due to hypoglycemia off steroids -> improved after glucose -> insulin adjusted; a second episode of altered mental followed with normal glucose -> started on antibiotics due to concern of a stump infection and transferred to the ICU for observation; now more awake and alert but not back to baseline mental status sitting in the chair; 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 -> significant right leg pain continues now off narcotics and gabapentin due to confusion; no shortness of breath or hypoxemia on a 2lpm nasal canula; occasional weak cough productive of scant sputum; persistent chest congestion and wheeze exacerbated when eating; no fevers, chills or sweats; no chest pain/pressure or palpitations; FEEST revealed severe dysphagia -> non oral nutrition recommended -> the family has elected to continue oral feeding understanding the risks of aspiration (small bites and easy to chew diet with moderately thickened fluids ordered); left facial droop and left sided weakness are at baseline); received 1 unit PRBCs 4/26 for post-operative anemia; hudson remains in place due to urinary retention    5/2 - up in bed; much more awake and alert; continues on antibiotics due to concern of a stump infection and aspiration pneumonia; 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 -> right leg pain is improved; no shortness of breath or hypoxemia on room air; occasional weak cough productive of scant sputum; persistent chest congestion and wheeze exacerbated when eating; no fevers, chills or sweats; no chest pain/pressure or palpitations; FEEST revealed severe dysphagia -> non oral nutrition recommended -> the family has elected to continue oral feeding understanding the risks of aspiration (small bites and easy to chew diet with moderately thickened fluids ordered); left facial droop and left sided weakness are at baseline); hudson remains in place due to urinary retention    PLAN/RECOMMENDATIONS:    stable oxygenation on room air  continues on zosyn due to concerns regarding possible stump infection or aspiration pneumonia  CT chest to better evaluate for the presence or absence of pulmonary infection -> "to my eye" -> bibasilar impacted airways and bilateral consolidations opacities likely representing atelectasis in the absence of fevers or leukocytosis - the patient is at continuous risk of aspiration pneumonia - emphysema.  has completed a steroid taper - glucose control is improved  albuterol/atrovent nebs q6h  pulmicort 0.5mg nebs q12h - give after duoneb - rinse mouth after use  mucinex 1200mg 2 times daily  singulair 10mg @ bedtime  glycopyrrolate 1mg 2 times - watch for tachycardia, extreme dryness, urinary retention, etc  acapella device/incentive spirometer  cardiac meds: lipitor/toprol XL/lasix - still holding cozaar and metolazone  flomax/proscar -> hudson replaced due to urinary retention  GI prophylaxis - protonix  bowel regimen  glucose control  vascular surgery follow-up    INR is supratherapeutic on coumadin     analgesics - gabapentin/oxycodone as needed/tylenol as needed    s/p completion BKA    wound care (?) stump infection  out of bed and into the chair    speech and swallow evaluation noted - severe dysphagia - NPO with non-oral feeding recommended - I explained the ongoing risk of aspiration with possible pneumonia, worsening bronchospasm, hypoxemia, respiratory failure etc to the patient and family - they do not want placement of a NGT or PEG and wish to continue an oral diet - written for a bite sized and easy to chew diet with moderately thickened fluids - small bites - no straws - chin tuck maneuver explained and demonstrated    EEG -> mild to moderate nonspecific diffuse or multifocal cerebral dysfunction -  no epileptiform patterns or seizures recorded.  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, stable in appearance compared with prior dated 9/8/2019  CXR 4/18 - improving bilateral lower lobe opacities (atelectasis)  LUE Duplex is without DVT    Will follow with you. Plan of care discussed with the patient and his family at bedside and with the vascular team. No pulmonary objection to discharge.    Sarabjit Wright MD, [facility]  [phone]  Pulmonary Medicine

## 2022-05-03 NOTE — DISCHARGE NOTE NURSING/CASE MANAGEMENT/SOCIAL WORK - PATIENT PORTAL LINK FT
You can access the FollowMyHealth Patient Portal offered by [facility] by registering at the following website: [email or web address]. By joining [employer]’s FollowMyHealth portal, you will also be able to view your health information using other applications (apps) compatible with our system.

## 2022-05-03 NOTE — CHART NOTE - NSCHARTNOTESELECT_GEN_ALL_CORE
Endocrinology/Event Note
Event Note
Event Note
Nutrition Services
Transfusion and [facility]
endocrine/Event Note
preop
preop note/Event Note
Endocrinology/Event Note
Endocrinology/Event Note
Event Note
Multiple RRT's
Nephrology
Post OP Check
Post-Op check
Speech and Swallow
TOV
Wheezing
Wound Care Team Note:/Event Note
endocrine/Event Note

## 2022-05-03 NOTE — PROGRESS NOTE ADULT - ASSESSMENT
86M with PMH of COPD (not on home o2), prior smoking history (40 pack years), HTN, HLD, Afib, CHF, T2DM, CVA, BPH, and PAD admitted for elective RLE AKA. Renal consulted for CKD Mx.    KARLOS on CKD 3,   serum k stable  bicarb stable    Plan  cont monitor Serum Creatinine   Edema present, on lasix 20 PO BID  off losartan  Advised to hold lasix for now in light of decreasing blood pressure   monitor BMP daily and u/o   dose all meds for eGFR  avoid NSAIDs/Nephrotoxics.    Rt LE nonhealing wound:  follow up with vascular  S/P BKA      For any question, call:  Cell # [phone]  Pager # [phone]  Callback # [phone]

## 2022-05-03 NOTE — PROGRESS NOTE ADULT - SUBJECTIVE AND OBJECTIVE BOX
[facility] - ORESTES Wall / ORESTES Wynne / KARIE Melendez/ ORESTES Knight/ ORESTES Jang/ ADRIA Lee / TOÑITO Mora / ROBB Kapoor  ---------------------------------------------------------------------------------------------------------------  seen and examined today for KARLOS  Interval : edema +  VITALS:  T(F): 98 (05-03-22 @ 12:33), Max: 99 (05-02-22 @ 21:09)  HR: 70 (05-03-22 @ 12:33)  BP: 129/67 (05-03-22 @ 12:33)  RR: 18 (05-03-22 @ 12:33)  SpO2: 94% (05-03-22 @ 12:33)  Wt(kg): --    05-02 @ 07:01  -  05-03 @ 07:00  --------------------------------------------------------  IN: 1080 mL / OUT: 1500 mL / NET: -420 mL    05-03 @ 07:01  -  05-03 @ 14:10  --------------------------------------------------------  IN: 290 mL / OUT: 450 mL / NET: -160 mL      Physical Exam :-  Constitutional: NAD  Neck: Supple.  Respiratory: Bilateral equal breath sounds,  Cardiovascular: S1, S2 normal,  Gastrointestinal: Bowel Sounds present, soft, non tender.  Extremities: No edema  Neurological: Alert and Oriented x 3, no focal deficits  Psychiatric: Normal mood, normal affect  Data:-  Allergies :   No Known Allergies    Hospital Medications:   MEDICATIONS  (STANDING):  acetaminophen     Tablet .. 975 milliGRAM(s) Oral every 6 hours  albuterol/ipratropium for Nebulization 3 milliLiter(s) Nebulizer every 6 hours  amoxicillin  875 milliGRAM(s)/clavulanate 1 Tablet(s) Oral two times a day  atorvastatin 40 milliGRAM(s) Oral at bedtime  buDESOnide    Inhalation Suspension 0.5 milliGRAM(s) Inhalation every 12 hours  finasteride 5 milliGRAM(s) Oral daily  furosemide    Tablet 20 milliGRAM(s) Oral two times a day  gabapentin 100 milliGRAM(s) Oral every 8 hours  glycopyrrolate 1 milliGRAM(s) Oral two times a day  guaiFENesin ER 1200 milliGRAM(s) Oral every 12 hours  insulin glargine Injectable (LANTUS) 12 Unit(s) SubCutaneous at bedtime  insulin lispro (ADMELOG) corrective regimen sliding scale   SubCutaneous at bedtime  insulin lispro (ADMELOG) corrective regimen sliding scale   SubCutaneous three times a day before meals  insulin lispro Injectable (ADMELOG) 2 Unit(s) SubCutaneous three times a day before meals  levothyroxine 25 MICROGram(s) Oral daily  metoprolol succinate ER 25 milliGRAM(s) Oral daily  montelukast 10 milliGRAM(s) Oral daily  multivitamin/minerals 1 Tablet(s) Oral daily  pantoprazole    Tablet 40 milliGRAM(s) Oral before breakfast  polyethylene glycol 3350 17 Gram(s) Oral daily  senna 2 Tablet(s) Oral at bedtime  tamsulosin 0.8 milliGRAM(s) Oral at bedtime    05-03    137  |  104  |  29<H>  ----------------------------<  161<H>  3.9   |  19<L>  |  0.99    Ca    8.5      03 May 2022 06:49  Phos  2.7     05-03  Mg     1.7     05-03      Creatinine Trend: 0.99 <--, 1.01 <--, 1.01 <--, 1.10 <--, 1.27 <--, 0.93 <--, 0.98 <--, 0.99 <--                        8.1    5.42  )-----------( 185      ( 03 May 2022 06:49 )             27.0

## 2022-05-03 NOTE — PROGRESS NOTE ADULT - ATTENDING COMMENTS
88 yo M s/p R BKA  recovering well  BKA stump with ischemic skin edges but no dehiscence, ischemic appearing posterior flap  continue to offload stump  continue knee immobilizer  pain control  transition to Augmentin for 7 day course  physical therapy  discharge planning

## 2022-05-03 NOTE — PROGRESS NOTE ADULT - SUBJECTIVE AND OBJECTIVE BOX
Ritesh Bell MD  Interventional Cardiology / Endovascular Specialist  [place] Office : [phone] [place] [address] [place] [address]  Tel:   [place] Office : [address] [facility] [place] [address]  Tel: [phone]      Subjective/Overnight events: Patient lying in bed comfortably. No acute distress.  	  MEDICATIONS:  furosemide    Tablet 20 milliGRAM(s) Oral two times a day  metoprolol succinate ER 25 milliGRAM(s) Oral daily  tamsulosin 0.8 milliGRAM(s) Oral at bedtime    piperacillin/tazobactam IVPB.. 3.375 Gram(s) IV Intermittent every 8 hours    albuterol/ipratropium for Nebulization 3 milliLiter(s) Nebulizer every 6 hours  buDESOnide    Inhalation Suspension 0.5 milliGRAM(s) Inhalation every 12 hours  guaiFENesin ER 1200 milliGRAM(s) Oral every 12 hours  montelukast 10 milliGRAM(s) Oral daily    acetaminophen     Tablet .. 975 milliGRAM(s) Oral every 6 hours  acetaminophen   IVPB .. 1000 milliGRAM(s) IV Intermittent once PRN  gabapentin 100 milliGRAM(s) Oral every 8 hours  oxyCODONE    IR 5 milliGRAM(s) Oral every 6 hours PRN    glycopyrrolate 1 milliGRAM(s) Oral two times a day  pantoprazole    Tablet 40 milliGRAM(s) Oral before breakfast  polyethylene glycol 3350 17 Gram(s) Oral daily  senna 2 Tablet(s) Oral at bedtime    atorvastatin 40 milliGRAM(s) Oral at bedtime  finasteride 5 milliGRAM(s) Oral daily  insulin glargine Injectable (LANTUS) 12 Unit(s) SubCutaneous at bedtime  insulin lispro (ADMELOG) corrective regimen sliding scale   SubCutaneous three times a day before meals  insulin lispro (ADMELOG) corrective regimen sliding scale   SubCutaneous at bedtime  insulin lispro Injectable (ADMELOG) 2 Unit(s) SubCutaneous three times a day before meals  levothyroxine 25 MICROGram(s) Oral daily    multivitamin/minerals 1 Tablet(s) Oral daily      PAST MEDICAL/SURGICAL HISTORY  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    Deep Vein Thrombosis (DVT)  17 yeras ago on Coumadin    CHF (congestive heart failure)  last exacerbation in 1/2017    Enlarged prostate    GERD (gastroesophageal reflux disease)    Hernia  umblical    Calculus of bile duct without cholangitis or cholecystitis without obstruction    Atrial fibrillation    S/P Hernia Repair    S/P cataract surgery, unspecified laterality    S/P ERCP  3/2017        SOCIAL HISTORY: Substance Use (street drugs): ( x ) never used  (  ) other:    FAMILY HISTORY:        PHYSICAL EXAM:  T(C): 37 (05-03-22 @ 05:07), Max: 37.2 (05-02-22 @ 21:09)  HR: 90 (05-03-22 @ 05:07) (80 - 90)  BP: 133/66 (05-03-22 @ 05:07) (130/63 - 138/60)  RR: 18 (05-03-22 @ 05:07) (18 - 18)  SpO2: 93% (05-03-22 @ 05:07) (92% - 98%)  Wt(kg): --  I&O's Summary    02 May 2022 07:01  -  03 May 2022 07:00  --------------------------------------------------------  IN: 1080 mL / OUT: 1500 mL / NET: -420 mL             EYES:   PERRLA   ENMT:   Moist mucous membranes, Good dentition, No lesions  Cardiovascular: Normal S1 S2, No JVD, No murmurs, No edema  Respiratory: b/l rhonchi   Gastrointestinal:  Soft, Non-tender, + BS	  Extremities: s/p AKA                              8.1    5.42  )-----------( 185      ( 03 May 2022 06:49 )             27.0     05-03    137  |  104  |  29<H>  ----------------------------<  161<H>  3.9   |  19<L>  |  0.99    Ca    8.5      03 May 2022 06:49  Phos  2.7     05-03  Mg     1.7     05-03      proBNP:   Lipid Profile:   HgA1c:   TSH:     Consultant(s) Notes Reviewed:  [x ] YES  [ ] NO    Care Discussed with Consultants/Other Providers [ x] YES  [ ] NO    Imaging Personally Reviewed independently:  [x] YES  [ ] NO    All labs, radiologic studies, vitals, orders and medications list reviewed. Patient is seen and examined at bedside. Case discussed with medical team.

## 2022-05-03 NOTE — PROGRESS NOTE ADULT - SUBJECTIVE AND OBJECTIVE BOX
GENERAL SURGERY DAILY PROGRESS NOTE:    Interval:  No acute events overnight.    Subjective:  Patient seen and examined. Reports pain is well controlled. Denies N/V.    Vital Signs Last 24 Hrs  T(C): 37.2 (02 May 2022 21:09), Max: 37.2 (02 May 2022 21:09)  T(F): 99 (02 May 2022 21:09), Max: 99 (02 May 2022 21:09)  HR: 84 (02 May 2022 21:09) (74 - 85)  BP: 134/69 (02 May 2022 21:09) (115/61 - 159/67)  BP(mean): --  RR: 18 (02 May 2022 21:09) (18 - 18)  SpO2: 93% (02 May 2022 21:09) (92% - 96%)    Physical Exam:  General lying in bed in nad  Respiratory: non labored   R BKA stump with operative dressing in place that is c/d/i, no strikethrough noted, stump appears swollen, erythematous and with some skin discoloration along the stump line, staples are intact with out any purulent or serosanguinous discharge, knee immobilizer in place      I&O's Detail    01 May 2022 07:01  -  02 May 2022 07:00  --------------------------------------------------------  IN:    Heparin: 186 mL    IV PiggyBack: 100 mL    Oral Fluid: 660 mL  Total IN: 946 mL    OUT:    Indwelling Catheter - Urethral (mL): 1900 mL  Total OUT: 1900 mL    Total NET: -954 mL      02 May 2022 07:01  -  03 May 2022 00:06  --------------------------------------------------------  IN:    Oral Fluid: 880 mL  Total IN: 880 mL    OUT:    Indwelling Catheter - Urethral (mL): 1150 mL  Total OUT: 1150 mL    Total NET: -270 mL          Daily     Daily     MEDICATIONS  (STANDING):  acetaminophen     Tablet .. 975 milliGRAM(s) Oral every 6 hours  albuterol/ipratropium for Nebulization 3 milliLiter(s) Nebulizer every 6 hours  atorvastatin 40 milliGRAM(s) Oral at bedtime  buDESOnide    Inhalation Suspension 0.5 milliGRAM(s) Inhalation every 12 hours  finasteride 5 milliGRAM(s) Oral daily  furosemide    Tablet 20 milliGRAM(s) Oral two times a day  gabapentin 100 milliGRAM(s) Oral every 8 hours  glycopyrrolate 1 milliGRAM(s) Oral two times a day  guaiFENesin ER 1200 milliGRAM(s) Oral every 12 hours  insulin glargine Injectable (LANTUS) 12 Unit(s) SubCutaneous at bedtime  insulin lispro (ADMELOG) corrective regimen sliding scale   SubCutaneous three times a day before meals  insulin lispro (ADMELOG) corrective regimen sliding scale   SubCutaneous at bedtime  insulin lispro Injectable (ADMELOG) 2 Unit(s) SubCutaneous three times a day before meals  levothyroxine 25 MICROGram(s) Oral daily  metoprolol succinate ER 25 milliGRAM(s) Oral daily  montelukast 10 milliGRAM(s) Oral daily  multivitamin/minerals 1 Tablet(s) Oral daily  pantoprazole    Tablet 40 milliGRAM(s) Oral before breakfast  piperacillin/tazobactam IVPB.. 3.375 Gram(s) IV Intermittent every 8 hours  polyethylene glycol 3350 17 Gram(s) Oral daily  senna 2 Tablet(s) Oral at bedtime  tamsulosin 0.8 milliGRAM(s) Oral at bedtime    MEDICATIONS  (PRN):  acetaminophen   IVPB .. 1000 milliGRAM(s) IV Intermittent once PRN Mild Pain (1 - 3)  oxyCODONE    IR 5 milliGRAM(s) Oral every 6 hours PRN Severe Pain (7 - 10)      LABS:                        7.7    5.64  )-----------( 127      ( 02 May 2022 07:16 )             25.5     05-02    137  |  104  |  24<H>  ----------------------------<  158<H>  4.0   |  20<L>  |  1.01    Ca    8.3<L>      02 May 2022 07:25  Phos  2.6     05-02  Mg     1.8     05-02      PT/INR - ( 02 May 2022 07:10 )   PT: 30.8 sec;   INR: 2.63 ratio         PTT - ( 02 May 2022 07:10 )  PTT:82.0 sec    86M PMH HTN, HLD, DM2 and nonhealing wounds of RLE who is now 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 Hospital course complicated by severe COPD exacerbation requiring SICU admission. Now on the floor.    PLAN:  - Daily dressing changes  - Cont abx for now  - F/u INR and redose warfarin accordingly  - Pain control   - CC Regs  - ISS  - Per podiatry re: left heel discoloration- cont with z flow boot in bed at all times, leave open to air  - Dispo: [facility], family given choices; has chronic hudson which pt will be dcd with    Vascular Surgery  [id number]   GENERAL SURGERY DAILY PROGRESS NOTE:    Interval:  No acute events overnight.    Subjective:  Patient seen and examined. Reports pain is well controlled. Denies N/V.    Vital Signs Last 24 Hrs  T(C): 37.2 (02 May 2022 21:09), Max: 37.2 (02 May 2022 21:09)  T(F): 99 (02 May 2022 21:09), Max: 99 (02 May 2022 21:09)  HR: 84 (02 May 2022 21:09) (74 - 85)  BP: 134/69 (02 May 2022 21:09) (115/61 - 159/67)  BP(mean): --  RR: 18 (02 May 2022 21:09) (18 - 18)  SpO2: 93% (02 May 2022 21:09) (92% - 96%)    Physical Exam:  General lying in bed in nad  Respiratory: non labored   R BKA stump with operative dressing in place that is c/d/i, no strikethrough noted, stump appears swollen, erythematous and with some skin discoloration along the stump line, staples are intact with out any purulent or serosanguinous discharge, knee immobilizer in place      I&O's Detail    01 May 2022 07:01  -  02 May 2022 07:00  --------------------------------------------------------  IN:    Heparin: 186 mL    IV PiggyBack: 100 mL    Oral Fluid: 660 mL  Total IN: 946 mL    OUT:    Indwelling Catheter - Urethral (mL): 1900 mL  Total OUT: 1900 mL    Total NET: -954 mL      02 May 2022 07:01  -  03 May 2022 00:06  --------------------------------------------------------  IN:    Oral Fluid: 880 mL  Total IN: 880 mL    OUT:    Indwelling Catheter - Urethral (mL): 1150 mL  Total OUT: 1150 mL    Total NET: -270 mL          Daily     Daily     MEDICATIONS  (STANDING):  acetaminophen     Tablet .. 975 milliGRAM(s) Oral every 6 hours  albuterol/ipratropium for Nebulization 3 milliLiter(s) Nebulizer every 6 hours  atorvastatin 40 milliGRAM(s) Oral at bedtime  buDESOnide    Inhalation Suspension 0.5 milliGRAM(s) Inhalation every 12 hours  finasteride 5 milliGRAM(s) Oral daily  furosemide    Tablet 20 milliGRAM(s) Oral two times a day  gabapentin 100 milliGRAM(s) Oral every 8 hours  glycopyrrolate 1 milliGRAM(s) Oral two times a day  guaiFENesin ER 1200 milliGRAM(s) Oral every 12 hours  insulin glargine Injectable (LANTUS) 12 Unit(s) SubCutaneous at bedtime  insulin lispro (ADMELOG) corrective regimen sliding scale   SubCutaneous three times a day before meals  insulin lispro (ADMELOG) corrective regimen sliding scale   SubCutaneous at bedtime  insulin lispro Injectable (ADMELOG) 2 Unit(s) SubCutaneous three times a day before meals  levothyroxine 25 MICROGram(s) Oral daily  metoprolol succinate ER 25 milliGRAM(s) Oral daily  montelukast 10 milliGRAM(s) Oral daily  multivitamin/minerals 1 Tablet(s) Oral daily  pantoprazole    Tablet 40 milliGRAM(s) Oral before breakfast  piperacillin/tazobactam IVPB.. 3.375 Gram(s) IV Intermittent every 8 hours  polyethylene glycol 3350 17 Gram(s) Oral daily  senna 2 Tablet(s) Oral at bedtime  tamsulosin 0.8 milliGRAM(s) Oral at bedtime    MEDICATIONS  (PRN):  acetaminophen   IVPB .. 1000 milliGRAM(s) IV Intermittent once PRN Mild Pain (1 - 3)  oxyCODONE    IR 5 milliGRAM(s) Oral every 6 hours PRN Severe Pain (7 - 10)      LABS:                        7.7    5.64  )-----------( 127      ( 02 May 2022 07:16 )             25.5     05-02    137  |  104  |  24<H>  ----------------------------<  158<H>  4.0   |  20<L>  |  1.01    Ca    8.3<L>      02 May 2022 07:25  Phos  2.6     05-02  Mg     1.8     05-02      PT/INR - ( 02 May 2022 07:10 )   PT: 30.8 sec;   INR: 2.63 ratio         PTT - ( 02 May 2022 07:10 )  PTT:82.0 sec    86M PMH HTN, HLD, DM2 and nonhealing wounds of RLE who is now 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 Hospital course complicated by severe COPD exacerbation requiring SICU admission. Now on the floor.    PLAN:  - Daily dressing changes  - change abx to augmentin x 7 days   - F/u INR and redose warfarin accordingly  - Pain control   - CC Regs  - ISS  - Per podiatry re: left heel discoloration- cont with z flow boot in bed at all times, leave open to air  - Dispo: [facility], family given choices; has chronic hudson which pt will be dcd with    Vascular Surgery  [id number]

## 2022-05-03 NOTE — DISCHARGE NOTE NURSING/CASE MANAGEMENT/SOCIAL WORK - NSDCPEFALRISK_GEN_ALL_CORE
For information on Fall & Injury Prevention, visit: [email or web address] OR  [email or web address] OR  https://www.cdc.gov/steadi/patient.html

## 2022-05-03 NOTE — PROGRESS NOTE ADULT - REASON FOR ADMISSION
Preoperative Planning for Right above knee amputation
Right above knee amputation
Preoperative Planning for Right above knee amputation

## 2022-05-03 NOTE — PROGRESS NOTE ADULT - ASSESSMENT
86 year old gentleman with a PMH DM, HTN, HLD who has been followed for the past 6 months for foot wounds and leg pain.      EKG -  A sense V paced PVC's  Echo - Mild LV dysfunction mild aortic stenosis    1) Post-op assessment   -pt has h/o moderate LV dysfunction as per echo 2017, no chest pains 2d echo now shows mild LV dysfunction  -12 lead EKG ok,  PPM interrogated  -s/p BKA     2) HTN  -controlled  -c/w metoprolol  -continue to monitor BP    3) Chronic systolic CHF   - hold metolazone   -c/w PO lasix 20mg BID  -monitor I&Os    4) ?Atrial fib   INR supratherapeutic 3.97 hold coumadin tonight   -monitor INR     5) KARLOS  -improving  -continue to hold losartan and metolazone  -f/u renal

## 2022-05-03 NOTE — CHART NOTE - NSCHARTNOTEFT_GEN_A_CORE
POC glucose, insulin requirements, lab values reviewed.  pt seen at bedside , tolerating po, dc to rehab today     c/w basal bolus regimen at rehab current doses (lantus 12 units sq qhs and admelog 2 units sq TID AC) can be further titrated by rehab MD  . can consider basal + orals on dc from rehab     spoke with vascular     CAPILLARY BLOOD GLUCOSE      POCT Blood Glucose.: 204 mg/dL (03 May 2022 13:24)  POCT Blood Glucose.: 172 mg/dL (03 May 2022 09:41)  POCT Blood Glucose.: 196 mg/dL (02 May 2022 21:37)  POCT Blood Glucose.: 134 mg/dL (02 May 2022 17:38)

## 2022-05-03 NOTE — DISCHARGE NOTE NURSING/CASE MANAGEMENT/SOCIAL WORK - NSDCVIVACCINE_GEN_ALL_CORE_FT
influenza, injectable, quadrivalent, preservative free; 11-Sep-2019 13:29; Jerry Modi ([facility]); [place]; [id number] (Exp. Date: 30-Jun-2020); IntraMuscular; Deltoid Left.; 0.5 milliLiter(s); VIS (VIS Published: 15-Aug-2019, VIS Presented: 11-Sep-2019);

## 2022-05-08 NOTE — SWALLOW BEDSIDE ASSESSMENT ADULT - ORAL PHASE
No definite etiology apparent.  Possibly clindamycin use.  Off intravenous clindamycin.  Use oral Benadryl 25 mg q.6 hours p.r.n. patient will be monitored closely.    4/30 - resolved     Within functional limits

## 2022-06-01 NOTE — HISTORY OF PRESENT ILLNESS
[FreeTextEntry1] : 86 year old male with hx of PVD presented with RLE ischemia affecting his R 2nd toe  underwent diagnostic angiogram  not a candidate for revascularization  [de-identified] : S/P R below knee amputation one month ago. Has intermittent phantom pain in RLE but otherwise no complaints. Denies fevers or chills.

## 2022-06-01 NOTE — PHYSICAL EXAM
[JVD] : no jugular venous distention  [Respiratory Effort] : normal respiratory effort [Normal Rate and Rhythm] : normal rate and rhythm [2+] : left 2+ [0] : left 0 [Ankle Swelling (On Exam)] : present [Varicose Veins Of Lower Extremities] : not present [] : of the left leg [Ankle Swelling On The Left] : moderate [Skin Ulcer] : ulcer [Alert] : alert [Oriented to Person] : oriented to person [Oriented to Place] : oriented to place [Oriented to Time] : oriented to time [Calm] : calm [de-identified] : appears stated age [de-identified] : normocephalic, atraumatic [FreeTextEntry1] : Left leg ischemia progressing\par Right BKA stump with large medial wound secondary to dehiscence.

## 2022-06-01 NOTE — ASSESSMENT
[FreeTextEntry1] : Problem #1 Right BKA stump wound\par - Will start BID Santyl treatments\par - all staples removed today\par - follow up in two weeks for wound check

## 2022-06-04 NOTE — SWALLOW FEES ASSESSMENT ADULT - RESIDUE IN LARYNGEAL VESTIBULE/VENTRICAL
Trace residue remaining on the AE folds and on the anterior notch of the vocal folds No difficulties Trace-mild residue noted on the anterior vocal fold, on b/l AE folds, and in interarytenoid space. Cued cough does not clear. Mild residue observed deeply within the laryngeal surface of the epiglottis and on the laryngeal surface of the arytenoids. Cued cough does not clear.

## 2022-06-22 NOTE — PHYSICAL EXAM
[JVD] : no jugular venous distention  [Respiratory Effort] : normal respiratory effort [Normal Rate and Rhythm] : normal rate and rhythm [2+] : left 2+ [0] : left 0 [Ankle Swelling (On Exam)] : present [Varicose Veins Of Lower Extremities] : not present [] : of the left leg [Ankle Swelling On The Left] : moderate [Skin Ulcer] : ulcer [Alert] : alert [Oriented to Person] : oriented to person [Oriented to Place] : oriented to place [Oriented to Time] : oriented to time [Calm] : calm [de-identified] : appears stated age [de-identified] : normocephalic, atraumatic [FreeTextEntry1] : Left leg ischemia progressing\par Right BKA stump with large medial wound secondary to dehiscence which is granulating and mary well

## 2022-06-22 NOTE — HISTORY OF PRESENT ILLNESS
[FreeTextEntry1] : 86 year old male with hx of PVD  presented with RLE ischemia affecting his R 2nd toe  underwent diagnostic angiogram  not a candidate for revascularization \par Now s/p right BKA [de-identified] : Denies any new complaints.

## 2022-06-22 NOTE — ASSESSMENT
[FreeTextEntry1] : Problem #1 Right BKA stump wound\par - Continue Santyl BID with dressing changes\par - Fibrinous exudate debrided today and wound bed scraped\par - Follow up in two weeks for wound check

## 2022-07-06 NOTE — ASSESSMENT
[FreeTextEntry1] : Problem #1 R BKA stump wound\par - Continues to contract and granulate well\par - Debrided today in office\par - Continue BID Santyl therapy with dressing changes\par - Follow up in 2 weeks for interval wound check\par \par Problem #2 L heel wound\par - Small eschar, no evidence of infection\par - Continue to offload

## 2022-07-06 NOTE — PHYSICAL EXAM
[JVD] : no jugular venous distention  [Respiratory Effort] : normal respiratory effort [Normal Rate and Rhythm] : normal rate and rhythm [2+] : left 2+ [0] : left 0 [Ankle Swelling (On Exam)] : present [Varicose Veins Of Lower Extremities] : not present [] : of the left leg [Ankle Swelling On The Left] : moderate [Skin Ulcer] : ulcer [Alert] : alert [Oriented to Person] : oriented to person [Oriented to Place] : oriented to place [Oriented to Time] : oriented to time [Calm] : calm [de-identified] : appears stated age [de-identified] : normocephalic, atraumatic [FreeTextEntry1] : Left leg ischemia progressing\par Right BKA stump with large medial wound secondary to dehiscence which is granulating and mary well

## 2022-07-06 NOTE — HISTORY OF PRESENT ILLNESS
[FreeTextEntry1] : 86 year old male with hx of PVD  presented with RLE ischemia affecting his R 2nd toe  underwent diagnostic angiogram  not a candidate for revascularization \par Now s/p right BKA [de-identified] : Denies any new complaints.

## 2022-07-20 NOTE — ASSESSMENT
[FreeTextEntry1] : Problem #1 s/p Right BKA\par - stump continuing to heal well\par - continue daily santyl treatments with dressing changes\par - offload stump\par - continue knee immobilizer\par \par Problem #2 left heel decubitus ulcer\par - being managed by wound care at rehab\par - recommend offloading left heel

## 2022-07-20 NOTE — PHYSICAL EXAM
[JVD] : no jugular venous distention  [Respiratory Effort] : normal respiratory effort [Normal Rate and Rhythm] : normal rate and rhythm [2+] : left 2+ [0] : left 0 [Ankle Swelling (On Exam)] : present [Varicose Veins Of Lower Extremities] : not present [] : of the left leg [Ankle Swelling On The Left] : moderate [Skin Ulcer] : ulcer [Alert] : alert [Oriented to Person] : oriented to person [Oriented to Place] : oriented to place [Oriented to Time] : oriented to time [Calm] : calm [de-identified] : appears stated age [de-identified] : normocephalic, atraumatic [FreeTextEntry1] : Left leg ischemia progressing\par Right BKA stump with  medial wound secondary to dehiscence which is granulating and mary well

## 2022-07-20 NOTE — HISTORY OF PRESENT ILLNESS
[FreeTextEntry1] : 86 year old male with hx of PVD  presented with RLE ischemia affecting his R 2nd toe  underwent diagnostic angiogram  not a candidate for revascularization \par Now s/p right BKA [de-identified] : Denies any new complaints.

## 2022-08-03 NOTE — ASSESSMENT
[FreeTextEntry1] : Problem #1 s/p right BKA\par - medial wound continuing to granulate and contract well\par - lateral wound eschar sloughing\par - continue santyl to medial wound\par - 3 week follow up

## 2022-08-03 NOTE — HISTORY OF PRESENT ILLNESS
[FreeTextEntry1] : 86 year old male with hx of PVD  presented with RLE ischemia affecting his R 2nd toe  underwent diagnostic angiogram  not a candidate for revascularization \par Now s/p right BKA [de-identified] : Denies any new complaints.

## 2022-08-03 NOTE — PHYSICAL EXAM
[JVD] : no jugular venous distention  [Respiratory Effort] : normal respiratory effort [Normal Rate and Rhythm] : normal rate and rhythm [2+] : left 2+ [0] : left 0 [Ankle Swelling (On Exam)] : present [Varicose Veins Of Lower Extremities] : not present [] : of the left leg [Ankle Swelling On The Left] : moderate [Skin Ulcer] : ulcer [Alert] : alert [Oriented to Person] : oriented to person [Oriented to Place] : oriented to place [Oriented to Time] : oriented to time [Calm] : calm [de-identified] : appears stated age [de-identified] : normocephalic, atraumatic [FreeTextEntry1] : Left leg ischemia progressing\par Right BKA stump with  medial wound secondary to dehiscence which is granulating and mary well

## 2022-08-24 NOTE — ASSESSMENT
[FreeTextEntry1] : Problem #1 R BKA Stump wound\par - healing well\par - applied silver nitrate to medial granulation today to help prevent ongoing serous drainage\par - follow up in 3 weeks

## 2022-08-24 NOTE — PHYSICAL EXAM
[JVD] : no jugular venous distention  [Respiratory Effort] : normal respiratory effort [Normal Rate and Rhythm] : normal rate and rhythm [2+] : left 2+ [0] : left 0 [Ankle Swelling (On Exam)] : present [Varicose Veins Of Lower Extremities] : not present [] : of the left leg [Ankle Swelling On The Left] : moderate [Skin Ulcer] : ulcer [Alert] : alert [Oriented to Person] : oriented to person [Oriented to Place] : oriented to place [Oriented to Time] : oriented to time [Calm] : calm [de-identified] : appears stated age [de-identified] : normocephalic, atraumatic [FreeTextEntry1] : Left leg ischemia progressing, stable left lateral foot wound, dry\par Right BKA stump with  medial wound secondary to dehiscence which is granulating and mary well

## 2022-08-24 NOTE — HISTORY OF PRESENT ILLNESS
[FreeTextEntry1] : 86 year old male with hx of PVD  presented with RLE ischemia affecting his R 2nd toe  underwent diagnostic angiogram  not a candidate for revascularization \par Now s/p right BKA [de-identified] : Denies any new complaints.

## 2022-09-14 PROBLEM — Z86.79 HISTORY OF CONGESTIVE HEART FAILURE: Status: RESOLVED | Noted: 2017-04-29 | Resolved: 2022-01-01

## 2022-09-14 NOTE — ED PROVIDER NOTE - ATTENDING APP SHARED VISIT CONTRIBUTION OF CARE
87 M w/ BP, PAD s/p R bka, COPD HTN, HLD afib CVA, w 87 M w/ BPh, PAD s/p R bka, COPD HTN, HLD afib CVA, w/ residual L sided weakness, presents to the ER w/ SOB and crackles, pt w/ no cp. has appointment for L foot wounds and was found to have sob and trouble breathing. pt is brought in by daughter, hx provided by daughter primarily,   pt is aaox3, but slow to respond to questions  Per daughter pt reports cough, has no fevers, no chills.   On exam, Const: no acute distress, Well-developed, Eyes: no conjunctival injection and no scleral icterus ENMT: Moist mucus membranes, CVS: +S1/S2, radial pulse 2+ bilaterally RESP: coarse breath sounds bialterally, GI: Nontender/Nondistended soft abdomen MSK: weakness of the LUE and LLE (baseline per wife), pt w/ BKA on the R side w/ no erythema of the bialteral lower legs,  Psych: Awake, Alert, & Orientedx3;  Appropriate mood and affect, cooperative  Plan for labs imaging and reassessment findings concerning for chf exacerbation, low suspicion for pneumonia

## 2022-09-14 NOTE — H&P ADULT - ASSESSMENT
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   PMH earlier this year: The patient was admitted in April after many months of outpatient therapy for chronic right foot wounds and leg pain. 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   Post-operative course was notable for poor wound healing (was considered for an AKA), hypoxic respiratory failure due to a COPD exacerbation, restrictive lung disease and aspiration pneumonia. He required ICU admission for an insulin infusion for steroid induced hyperglycemia, heparin gtt for PAD and encephalopathy related to narcotic analgesics for post-operative pain.   The patient was found to have dysphagia but the family decided against non-oral feeding and opted for "pleasure feeds".   The patient was discharged on May 3rd.   He has been at rehab since that time. He recently was fitted for a prosthesis in hopes of starting ambulation.   He has a wound on his left foot.   His LAsix was lowered 2/2 hypernatremia few days PTA  He is sent to the ER with shortness of breath, hypoxemia and "gurgling" in his chest.   The patient has a cough with difficulty expectorating sputum. He has chest congestion and wheeze. No fevers, chills or sweats. No chest pain/pressure or palpitations. Chest radiograph is abnormal.    CT -> patent central airways - centrilobular emphysema - moderate left and small right pleural effusions with associated partial right and complete left lower lobe compressive atelectasis - patchy and nodular areas of consolidation throughout the aerated portions of the right lung dependently - other nodular areas of consolidation in the posterior left upper lobe persist although have improved    multifactorial hypoxemia and chronic hypercapnic respiratory failure  1) COPD/emphysema with exacerbation  2) bilateral left > right pleural effusions with associated atelectasis due to ischemic and valvular heart disease  3) 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 and respiratory muscle weakness  4) Aspiration PNA  5) acute on chronic systolic CHF    consults called: cardiology, pulmonary, endo, ID    on  3lpm nasal canula  pulm to determine if thoracentesis would be helpful  start ZOsyn for Aspiration PNA- await blood, urine and sputum culture  albuterol/atrovent nebs q6h  pulmicort 0.5mg nebs q12h - give after duoneb - rinse mouth after use  robitussin DM 300mg 4 times daily  singulair 10mg @ bedtime  cont  lipitor/losartan/metoprolol  start IV lasix 40 mg q12H  strict is and os  daily weights  check TTE  EP to interrogate PPM  Afib on Coumadin, check daily PT/INR  endo consult for regulating blood glucose  proscar/flomax - hudson catheter is in place  DVT prophylaxis not necessary, ptn is on full AC  bowel regimen

## 2022-09-14 NOTE — ED PROVIDER NOTE - SKIN WOUND TYPE
stage 2 pressure ulcers noted to L heel and L lateral foot with minimal periwound erythema./PRESSURE ULCER(S)

## 2022-09-14 NOTE — ED PROVIDER NOTE - OBJECTIVE STATEMENT
86 yo male pmhx BPH, PAD s/p R BKA, COPD (not on home O2), HTN, HLD, afib, CVA w/ residual L hemiparesis on coumadin, insulin-dependent diabetes, CHF on lasix (recently decreased dose 2 days ago 2/2 hypernatremia), presents to the ED c/o increasing shortness of breath and "gurgling" sounds in chest. Pt went to vascular Dr. Quinn's office today for routine appt for L foot wounds, in office was found to be short of breath and doctor heard gurgling cough and called EMS. Per daughter at bedside feels his shortness of breath has gotten worse since his lasix was decreased 2 days ago. Pt states he always has shortness of breath, feels it's slightly worse than baseline. Daughter states left leg swelling is better than it normally is. Cough is slightly productive with clear sputum, no discolored phlegm. Denies chest pain, abdominal pain, n/v/d, fever/chills, weakness, headache, dizziness, lightheadedness. 86 yo male pmhx BPH, PAD s/p R BKA, COPD (not on home O2), HTN, HLD, afib, CVA w/ residual L hemiparesis on coumadin, insulin-dependent diabetes, CHF on lasix (recently decreased dose 2 days ago 2/2 hypernatremia), presents to the ED c/o increasing shortness of breath and "gurgling" sounds in chest. Pt went to vascular Dr. Quinn's office today for routine appt for L foot wounds, in office was found to be short of breath and doctor heard gurgling cough and called EMS. Per daughter at bedside feels his shortness of breath has gotten worse since his lasix was decreased 2 days ago. Pt states he always has shortness of breath, feels it's slightly worse than baseline. Daughter states left leg swelling is better than it normally is. Cough is slightly productive with clear sputum, no discolored phlegm. Denies chest pain, abdominal pain, n/v/d, fever/chills, weakness, headache, dizziness, lightheadedness.  PMD: Dr. Francesco Savage  Cards: Dr. Evelio Patel

## 2022-09-14 NOTE — H&P ADULT - NSHPPHYSICALEXAM_GEN_ALL_CORE
T(C): 36.6 (09-14-22 @ 19:13), Max: 36.9 (09-14-22 @ 11:55)  HR: 81 (09-14-22 @ 19:13) (77 - 87)  BP: 124/64 (09-14-22 @ 19:13) (118/69 - 137/52)  RR: 22 (09-14-22 @ 19:13) (20 - 22)  SpO2: 95% (09-14-22 @ 19:13) (95% - 99%)    PHYSICAL EXAM:  GENERAL: NAD, well-developed  HEAD:  Atraumatic, Normocephalic  EYES: EOMI, PERRLA, conjunctiva and sclera clear  NECK: Supple, No JVD  CHEST/LUNG: Clear to auscultation bilaterally; No wheeze  HEART: Regular rate and rhythm; No murmurs, rubs, or gallops  ABDOMEN: Soft, Nontender, Nondistended; Bowel sounds present  EXTREMITIES:  2+ Peripheral Pulses, No clubbing, cyanosis, or edema  PSYCH: AAOx3  NEUROLOGY: non-focal  SKIN: No rashes or lesions

## 2022-09-14 NOTE — HISTORY OF PRESENT ILLNESS
[FreeTextEntry1] : 86 year old male with hx of PVD  presented with RLE ischemia affecting his R 2nd toe  underwent diagnostic angiogram  not a candidate for revascularization \par Now s/p right BKA [de-identified] : Occasional phantom limb pain. Currently on oxygen and appear short of breath.

## 2022-09-14 NOTE — CONSULT NOTE ADULT - ASSESSMENT
multifactorial hypoxemia and chronic hypercapnic respiratory failure  1) COPD/emphysema with exacerbation  2) bilateral left > right pleural effusions with associated atelectasis  3) 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 and respiratory muscle weakness  4) pneumonia  5) no evidence of pulmonary embolism    PLAN/RECOMMENDATIONS:    oxygen supplementation to keep saturation greater than 92%  bilateral thoracenteses  zosyn/doxycycline  solumedrol 20mg IVP q6h  albuterol/atrovent nebs q6h  pulmicort 0.5mg nebs q12h - give after duoneb - rinse mouth after use  robitussin DM 300mg 4 times daily  singulair 10mg @ bedtime  acapella device/incentive spirometer         ASSESSMENT:     87 year old gentleman, former smoker, followed by Dr. Alicja Rob of our practice for asthma/COPD overlap syndrome and obstructive sleep apnea being treated conservatively. He has no history of OTM colonization/infection as listed in the "past medical history". 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 The patient was admitted in April after many months of outpatient therapy for chronic right foot wounds and leg pain. 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 Post-operative course was notable for poor wound healing (was considered for an AKA), hypoxic respiratory failure due to a COPD exacerbation, restrictive lung disease and aspiration pneumonia. He required ICU admission for an insulin infusion for steroid induced hyperglycemia, heparin gtt for PAD and encephalopathy related to narcotic analgesics for post-operative pain. The patient was found to have dysphagia but the family decided against non-oral feeding and for "pleasure feeds". The patient was discharged on May 3rd. He has been at rehab since that time. He recently was fitted for a prosthesis in hopes of starting ambulation. He has a wound on his left foot. He is sent to the ER shortly after decreasing his lasix dose due to hypernatremia with shortness of breath, hypoxemia and "gurgling" in his chest. The patient has a cough with difficulty expectorating sputum. He has chest congestion and wheeze. No fevers, chills or sweats. No chest pain/pressure or palpitations. Chest radiograph is abnormal.    CT -> patent central airways - centrilobular emphysema - moderate left and small right pleural effusions with associated partial right and complete left lower lobe compressive atelectasis - patchy and nodular areas of consolidation throughout the aerated portions of the right lung dependently - other nodular areas of consolidation in the posterior left upper lobe persist although have improved    multifactorial hypoxemia and chronic hypercapnic respiratory failure  1) COPD/emphysema with exacerbation  2) bilateral left > right pleural effusions with associated atelectasis due to ischemic and valvular heart disease  3) 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 and respiratory muscle weakness  4) pneumonia perhaps related to aspiration  5) no evidence of pulmonary embolism    PLAN/RECOMMENDATIONS:    oxygen supplementation to keep saturation greater than 92% - currently on a 3lpm nasal canula  bilateral thoracenteses today if INR is less than 1.8  zosyn/doxycycline - await blood, urine and sputum culture  solumedrol 20mg IVP q6h  albuterol/atrovent nebs q6h  pulmicort 0.5mg nebs q12h - give after duoneb - rinse mouth after use  robitussin DM 300mg 4 times daily  singulair 10mg @ bedtime  acapella device/incentive spirometer  cardiac meds: lipitor/losartan/metoprolol/lasix  proscar/flomax - hudson catheter is in place  GI/DVT prophylaxis  bowel regimen    Thank you for the courtesy of this referral. Plan of care discussed with the patient at bedside and with Dr. Spaulding.    Sarabjit Wright MD, [facility]  [phone]  Pulmonary Medicine           ASSESSMENT:     87 year old gentleman, former smoker, followed by Dr. Alicja Rob of our practice for asthma/COPD overlap syndrome and obstructive sleep apnea being treated conservatively. He has no history of TOM colonization/infection as listed in the "past medical history". 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 The patient was admitted in April after many months of outpatient therapy for chronic right foot wounds and leg pain. 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 Post-operative course was notable for poor wound healing (was considered for an AKA), hypoxic respiratory failure due to a COPD exacerbation, restrictive lung disease and aspiration pneumonia. He required ICU admission for an insulin infusion for steroid induced hyperglycemia, heparin gtt for PAD and encephalopathy related to narcotic analgesics for post-operative pain. The patient was found to have dysphagia but the family decided against non-oral feeding and for "pleasure feeds". The patient was discharged on May 3rd. He has been at rehab since that time. He recently was fitted for a prosthesis in hopes of starting ambulation. He has a wound on his left foot. He is sent to the ER shortly after decreasing his lasix dose due to hypernatremia with shortness of breath, hypoxemia and "gurgling" in his chest. The patient has a cough with difficulty expectorating sputum. He has chest congestion and wheeze. No fevers, chills or sweats. No chest pain/pressure or palpitations. Chest radiograph is abnormal.    CT -> patent central airways - centrilobular emphysema - moderate left and small right pleural effusions with associated partial right and complete left lower lobe compressive atelectasis - patchy and nodular areas of consolidation throughout the aerated portions of the right lung dependently - other nodular areas of consolidation in the posterior left upper lobe persist although have improved    multifactorial hypoxemia and chronic hypercapnic respiratory failure  1) COPD/emphysema with exacerbation  2) bilateral left > right pleural effusions with associated atelectasis due to ischemic and valvular heart disease  3) 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 and respiratory muscle weakness  4) pneumonia perhaps related to aspiration  5) no evidence of pulmonary embolism    PLAN/RECOMMENDATIONS:    oxygen supplementation to keep saturation greater than 92% - currently on a 3lpm nasal canula  right thoracenteses today - the left effusion seems to small to "tap" safely  zosyn/doxycycline - await blood, urine and sputum culture  solumedrol 20mg IVP q6h  albuterol/atrovent nebs q6h  pulmicort 0.5mg nebs q12h - give after duoneb - rinse mouth after use  robitussin DM 300mg 4 times daily  singulair 10mg @ bedtime  acapella device/incentive spirometer  cardiac meds: lipitor/losartan/metoprolol/lasix  proscar/flomax - hudson catheter is in place  GI/DVT prophylaxis  bowel regimen    Thank you for the courtesy of this referral. Plan of care discussed with the patient at bedside and with Dr. Spaulding.    Sarabjit Wright MD, [facility]  [phone]  Pulmonary Medicine           ASSESSMENT:     87 year old gentleman, former smoker, followed by Dr. Alicja Rob of our practice for asthma/COPD overlap syndrome and obstructive sleep apnea being treated conservatively. He has no history of TOM colonization/infection as listed in the "past medical history". 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 The patient was admitted in April after many months of outpatient therapy for chronic right foot wounds and leg pain. 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 Post-operative course was notable for poor wound healing (was considered for an AKA), hypoxic respiratory failure due to a COPD exacerbation, restrictive lung disease and aspiration pneumonia. He required ICU admission for an insulin infusion for steroid induced hyperglycemia, heparin gtt for PAD and encephalopathy related to narcotic analgesics for post-operative pain. The patient was found to have dysphagia but the family decided against non-oral feeding and for "pleasure feeds". The patient was discharged on May 3rd. He has been at rehab since that time. He recently was fitted for a prosthesis in hopes of starting ambulation. He has a wound on his left foot. He is sent to the ER shortly after decreasing his lasix dose due to hypernatremia with shortness of breath, hypoxemia and "gurgling" in his chest. The patient has a cough with difficulty expectorating sputum. He has chest congestion and wheeze. No fevers, chills or sweats. No chest pain/pressure or palpitations. Chest radiograph is abnormal.    CT -> patent central airways - centrilobular emphysema - moderate left and small right pleural effusions with associated partial right and complete left lower lobe compressive atelectasis - patchy and nodular areas of consolidation throughout the aerated portions of the right lung dependently - other nodular areas of consolidation in the posterior left upper lobe persist although have improved    multifactorial hypoxemia and chronic hypercapnic respiratory failure  1) COPD/emphysema with exacerbation  2) bilateral left > right pleural effusions with associated atelectasis due to ischemic and valvular heart disease  3) 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 and respiratory muscle weakness  4) pneumonia perhaps related to aspiration  5) no evidence of pulmonary embolism    PLAN/RECOMMENDATIONS:    oxygen supplementation to keep saturation greater than 92% - currently on a 3lpm nasal canula  right thoracenteses today - the left effusion seems to small to "tap" safely  zosyn/doxycycline - await blood, urine and sputum culture  solumedrol 20mg IVP q6h  albuterol/atrovent nebs q6h  pulmicort 0.5mg nebs q12h - give after duoneb - rinse mouth after use  robitussin DM 300mg 4 times daily  singulair 10mg @ bedtime  acapella device/incentive spirometer  cardiac meds: lipitor/losartan/metoprolol/lasix  proscar/flomax - hudson catheter is in place  GI/DVT prophylaxis  bowel regimen    Thank you for the courtesy of this referral. Plan of care discussed with the patient at bedside and with Dr. Spaulding.    I will be away from Friday 9/16 until Monday 9/19. The patient's respiratory status remains stable. Dr. Javier Bazzi  will be covering our service. Please call [phone] with questions or clinical changes. Thank you.      Sarabjit Wright MD, [facility]  [phone]  Pulmonary Medicine

## 2022-09-14 NOTE — ASSESSMENT
[FreeTextEntry1] : Problem #1 Peripheral arterial disease, s/p right below knee amputation\par - Ready to be fit with prosthetic\par - continue offloading left heel\par \par Problem #2 shortness of breath\par - arranged for ambulance transportation to emergency room for further management of possible CHF exacerbation versus pneumonia

## 2022-09-14 NOTE — CONSULT NOTE ADULT - CONSULT REASON
chronic hypoxic/hyercapnic respiratory failure hypoxemia; chronic hypercapnic respiratory failure; COPD exacerbation; emphysema; bilateral pleural effusions; atelectasis; pneumonia

## 2022-09-14 NOTE — PHYSICAL EXAM
[JVD] : no jugular venous distention  [Wheezing] : wheezing was heard [Normal Rate and Rhythm] : normal rate and rhythm [2+] : left 2+ [0] : left 0 [Ankle Swelling (On Exam)] : present [Varicose Veins Of Lower Extremities] : not present [] : of the left leg [Ankle Swelling On The Left] : moderate [Skin Ulcer] : ulcer [Alert] : alert [Oriented to Person] : oriented to person [Oriented to Place] : oriented to place [Oriented to Time] : oriented to time [Calm] : calm [de-identified] : appears stated age [de-identified] : normocephalic, atraumatic [de-identified] : tachypneic, rales present [FreeTextEntry1] : Right BKA stump well healed

## 2022-09-14 NOTE — ED ADULT NURSE NOTE - HOW OFTEN DO YOU HAVE A DRINK CONTAINING ALCOHOL?
Medication changes made today:  None      Tests Ordered:  None      Follow up:  In office in 6 months  Remote in 3 months    Understanding your instructions:  If you feel that things may have been explained in a way that you do not understand or did not receive easy to understand instruction, please contact me at [phone] and I would be more than happy to review your plan of care with you. Your healthcare is important to us and we want to make sure you understand your directions.    Our Electrophysiology Team will continue to collaborate and work very closely together to best meet your needs.    Thank you for choosing us to take care of your electrophysiology needs. It is a pleasure to work with you, and again, please contact us for any questions or concerns anytime.   Medication changes made today:  None      Tests Ordered:  None      Follow up:  In office in 6 months  Remote in 3 months    Monitor BP daily, take your BP one hour after you have taken your morning pills, after you have been sitting for 10 minutes.  Maintain a log.  Bring your machine and log in with you at your next office visit      Understanding your instructions:  If you feel that things may have been explained in a way that you do not understand or did not receive easy to understand instruction, please contact me at [phone] and I would be more than happy to review your plan of care with you. Your healthcare is important to us and we want to make sure you understand your directions.    Our Electrophysiology Team will continue to collaborate and work very closely together to best meet your needs.    Thank you for choosing us to take care of your electrophysiology needs. It is a pleasure to work with you, and again, please contact us for any questions or concerns anytime.     
Never

## 2022-09-14 NOTE — ED ADULT NURSE NOTE - NSICDXPASTSURGICALHX_GEN_ALL_CORE_FT
PAST SURGICAL HISTORY:  S/P cataract surgery, unspecified laterality     S/P ERCP 3/2017    S/P Hernia Repair     
negative

## 2022-09-14 NOTE — ED PROVIDER NOTE - MUSCULOSKELETAL, MLM
Spine appears normal. R BKA. L leg w/ chronic vascular skin changes to distal 1/3rd lower leg. Stage 2 pressure ulcers noted to heel of L foot and lateral L foot.

## 2022-09-14 NOTE — ED PROVIDER NOTE - PROGRESS NOTE DETAILS
CT w/ pleural effusions and possible RLL infectious etiology, will cover w/ abx. Pt endorsed to Dr. Spaulding. - Richy Sequeira PA-C

## 2022-09-14 NOTE — ED ADULT NURSE REASSESSMENT NOTE - NS ED NURSE REASSESS COMMENT FT1
received report from ORACIO Leal. pt is comfortable resting in stretcher with side rails up for safety and call bell in reach. pt is A&Ox3, spontaneous unlabored respirations, VSS, denying pain, NAD noted at this time. Pt has chronic hudson in place, 600mL clear yellow urine noted in drainage bag. Patient admitted to Telemetry. Admission band noted in place, confirmed with 2 patient identifiers. Patient notified and updated on plan of care, pending bed.

## 2022-09-14 NOTE — ED ADULT NURSE NOTE - OBJECTIVE STATEMENT
87y male arrived to ED complaining of SOB. Patient PMHx R BKA, CHF, COPD, CVA x2 w/ residual L sided weakness, DM, BPH, HTN, HLD, afib. Patient on coumadin. Present with hudson in place from outside of hospital. Patient sent for labored breathing, gurgling sounds, cough and LE swelling. Patient decreased on his lasix 2 days ago and has worsened SOB since. Patient denies CP, n/v/d, abd pain, chills, fever. Patient A&Ox3, nonambulatory, VS stable, daughter at bedside.

## 2022-09-14 NOTE — H&P ADULT - HISTORY OF PRESENT ILLNESS
86 yo male pmhx BPH, PAD s/p R BKA, COPD (not on home O2), HTN, HLD, afib, CVA w/ residual L hemiparesis on coumadin, insulin-dependent diabetes, CHF on lasix (recently decreased dose 2 days ago 2/2 hypernatremia), presents to the ED c/o increasing shortness of breath and "gurgling" sounds in chest. Pt went to vascular Dr. Quinn's office today for routine appt for L foot wounds, in office was found to be short of breath and doctor heard gurgling cough and called EMS. Per daughter at bedside feels his shortness of breath has gotten worse since his lasix was decreased 2 days ago. Pt states he always has shortness of breath, feels it's slightly worse than baseline. Daughter states left leg swelling is better than it normally is. Cough is slightly productive with clear sputum, no discolored phlegm. Denies chest pain, abdominal pain, n/v/d, fever/chills, weakness, headache, dizziness, lightheadedness.

## 2022-09-14 NOTE — CONSULT NOTE ADULT - SUBJECTIVE AND OBJECTIVE BOX
[facility] - CONSULT NOTE    CHART REVIEW - patient to be seen 9/15    HPI:  87 year old gentleman, former smoker, followed by Dr. Alicja Rob of our practice for asthma/COPD overlap syndrome and obstructive sleep apnea being treated conservatively. He has no history of TOM colonization/infection as listed in the "past medical history". He has been stable from a pulmonary perspective and maintained on budesonide and duoneb once daily and if needed. 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 The patient was admitted in April being followed for many months for chronic right foot wounds and leg pain now with some purulence and gangrene. 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 Post-operative course was notable for the development of hypoxic respiratory failure due to a COPD exacerbation, 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 and respiratory muscle weakness and aspiration pneumonia. He required ICU admission for an insulin infusion for steroid induced hyperglycemia. heparin gtt for PAD and encephalopathy related to analgesics for post-operative pain. The patient was found to have dysphagia but the family decided against non-oral feeding and for "pleasure feeds". The patient was discharge on May 3rd. He returns shortly after decreasing his lasix dose due to hypernatremia with shortness of breath and "gurgling" in his chest. The patient has a cough with difficulty expectorating sputum. He has chest congestion and wheeze. No fevers, chills or sweats. No chest pain/pressure or palpitations.    PMHX:  Chronic Obstructive Pulmonary Disease (COPD)  Asthma  Mycobacterium Avium-Intracellulare Infection (?)  Obesity (mild)  Obstructive Sleep Apnea  Deep Vein Thrombosis (DVT)  Atrial fibrillation  Sick sinus syndrome  Hypertension  Hyperlipidemia  Diabetes Mellitus  Chronic kidney disease  CVA (Cerebral Vascular Accident) x 2  CHF (congestive heart failure)  BPH (benign prostatic hypertrophy)  GERD (gastroesophageal reflux disease)  Hernia  Biliary stones    PSHX:  Hernia Repair  Cataract surgery  ERCP for biliary stent  Pacemaker implanation    FAMILY HISTORY:  no pertinent past medical history in first degree relatives    SOCIAL HISTORY:  former smoker;  - lives with his wife    Pulmonary Medications:       Antimicrobials:  azithromycin  IVPB      azithromycin  IVPB 500 milliGRAM(s) IV Intermittent once  cefTRIAXone   IVPB          Cardiology:  furosemide   Injectable 40 milliGRAM(s) IV Push every 12 hours  metoprolol tartrate 25 milliGRAM(s) Oral two times a day      Other:  atorvastatin 80 milliGRAM(s) Oral at bedtime  dextrose 5%. 1000 milliLiter(s) IV Continuous <Continuous>  dextrose 5%. 1000 milliLiter(s) IV Continuous <Continuous>  dextrose 50% Injectable 25 Gram(s) IV Push once  dextrose 50% Injectable 12.5 Gram(s) IV Push once  dextrose 50% Injectable 25 Gram(s) IV Push once  enoxaparin Injectable 30 milliGRAM(s) SubCutaneous every 24 hours  glucagon  Injectable 1 milliGRAM(s) IntraMuscular once  insulin lispro (ADMELOG) corrective regimen sliding scale   SubCutaneous three times a day before meals      Prn:  MEDICATIONS  (PRN):  dextrose Oral Gel 15 Gram(s) Oral once PRN Blood Glucose LESS THAN 70 milliGRAM(s)/deciliter      Allergies    No Known Allergies    HOME MEDICATIONS: see  H & P    REVIEW OF SYSTEMS:  Constitutional: As per HPI  HEENT: Within normal limits  CV: As per HPI  Resp: As per HPI  GI: Within normal limits   : Within normal limits  Musculoskeletal: Within normal limits  Skin: Within normal limits  Neurological: Within normal limits  Psychiatric: Within normal limits  Endocrine: Within normal limits  Hematologic/Lymphatic: Within normal limits  Allergic/Immunologic: Within normal limits    [x] All other systems negative    OBJECTIVE:    Daily Height in cm: 172.72 (14 Sep 2022 11:55)      PHYSICAL EXAM:  ICU Vital Signs Last 24 Hrs  T(C): 36.6 (14 Sep 2022 16:23), Max: 36.9 (14 Sep 2022 11:55)  T(F): 97.8 (14 Sep 2022 16:23), Max: 98.5 (14 Sep 2022 11:55)  HR: 87 (14 Sep 2022 16:23) (77 - 87)  BP: 121/84 (14 Sep 2022 16:23) (118/69 - 137/52)  BP(mean): --  ABP: --  ABP(mean): --  RR: 21 (14 Sep 2022 16:23) (20 - 22)  SpO2: 96% (14 Sep 2022 16:23) (96% - 99%) on 1lpm nasal canula    General: Awake. Alert. Cooperative. No distress. Appears stated age 	  HEENT:   Atraumatic. Normocephalic. Anicteric. Normal oral mucosa. PERRL. EOMI.  Neck: Supple. Trachea midline. Thyroid without enlargement/tenderness/nodules. No carotid bruit. No JVD.	  Cardiovascular: Regular rate and rhythm. S1 S2 normal. No murmurs, rubs or gallops.  Respiratory: Respirations unlabored. Clear to auscultation and percussion bilaterally. No curvature.  Abdomen: Soft. Non-tender. Non-distended. No organomegaly. No masses. Normal bowel sounds.  Extremities: Warm to touch. No clubbing or cyanosis. No pedal edema.  Pulses: 2+ peripheral pulses all extremities.	  Skin: Normal skin color. No rashes or lesions. No ecchymoses. No cyanosis. Warm to touch.  Lymph Nodes: Cervical, supraclavicular and axillary nodes normal  Neurological: Motor and sensory examination equal and normal. A and O x 3  Psychiatry: Appropriate mood and affect.      LABS:                          7.7    9.40  )-----------( 293      ( 14 Sep 2022 13:36 )             27.5     CBC    WBC  9.40 <==    Hemoglobin  7.7 <<==    Hematocrit  27.5 <==    Platelets  293 <==      143  |  104  |  22  ----------------------------<  167<H>    09-14  4.0   |  31  |  0.89    LYTES    sodium  143 <==    potassium   4.0 <==    chloride  104 <==    carbon dioxide  31 <==    =============================================================================================  RENAL FUNCTION:    Creatinine:   0.89  <<==    BUN:   22 <==    ============================================================================================    calcium   8.2 <==    mag   1.8 <==    ============================================================================================  LFTs    AST:   12 <==     ALT:  11  <==     AP:  79  <=    Bili:  0.3  <=    PT/INR - ( 14 Sep 2022 13:36 )   PT: 23.2 sec;   INR: 2.00 ratio       PTT - ( 14 Sep 2022 13:36 )  PTT:35.0 sec    Venous Blood Gas:  09-14 @ 13:10  7.38/57/31/34/45.9  VBG Lactate: 1.2    Serum Pro-Brain Natriuretic Peptide: 2388 pg/mL (09-14 @ 13:36)    CARDIAC MARKERS ( 14 Sep 2022 16:27 )  CPK x     /CKMB x     /CKMB Units x        troponin 145 ng/L    CARDIAC MARKERS ( 14 Sep 2022 13:36 )  CPK x     /CKMB x     /CKMB Units x        troponin 154 ng/L    < from: TTE with Doppler (w/Cont) (04.03.22 @ 15:07) >    Patient name: Martir Heart  YOB: 1935   Age: 86 (M)   MR#: [id number]  Study Date: 4/3/2022  Location: [address] [place], [place] [id number]: Angie Olivarez RDCS  Study quality: Technically difficult  Referring Physician: Anmol Quinn MD  BloodPressure: 115/70 mmHg  Height: 173 cm  Weight: 92 kg  BSA: 2.1 m2  ------------------------------------------------------------------------  PROCEDURE: Transthoracic echocardiogram with 2-D, M-Mode  and complete spectral and color flow Doppler. Verbal  consent was obtained for injection of  Ultrasonic Enhancing  Agent following a discussion of risks and benefits.  Following intravenous injection of Ultrasonic Enhancing  Agent, harmonic imaging was performed.  INDICATION: Cardiomyopathy, unspecified (I42.9)  ------------------------------------------------------------------------  Dimensions:    Normal Values:  LA:     3.1    2.0 - 4.0 cm  Ao:     3.5    2.0 - 3.8 cm  SEPTUM: 1.1    0.6 - 1.2 cm  PWT:    1.3    0.6 - 1.1 cm  LVIDd:  4.3    3.0- 5.6 cm  LVIDs:  3.2    1.8 - 4.0 cm  Derived variables:  LVMI: 90 g/m2  RWT: 0.60  Fractional short: 26 %  EF (Visual Estimate): NWV %  Doppler Peak Velocity (m/sec): MV=1.6 AoV=1.4  ------------------------------------------------------------------------  Observations:  Mitral Valve: Mitral valve not well visualized. Mitral  annular calcification. Peak mitral valve gradient equals 10  mm Hg, mean transmitral valve gradient equals 4 mm Hg,  consistent with mild mitral stenosis.  Aortic Valve/Aorta: Aortic valve not well visualized;  appears calcified. Peak transaortic valve gradient equals 8  mm Hg, mean transaortic valve gradient equals 3 mm Hg,  estimated aortic valve area equals 2 sqcm (by continuity  equation), aortic valve velocity time integral equals 22  cm, consistent with mild aortic stenosis. Peak left  ventricular outflow tract gradient equals 3 mm Hg, mean  gradient is equal to 1 mm Hg, LVOT velocity time integral  equals 12 cm.  Aortic Root: 3.5 cm.  Left Atrium: Normal left atrium.  Left Ventricle: Endocardial visualization enhanced with  intravenous injection of Ultrasonic Enhancing Agent  (Definity). Mild left ventricular systolic dysfunction. The  inferior wall, and the inferoseptum are hypokinetic.  Normal left ventricular internal dimensions and wall  thicknesses. Unable to evaluate diastology.  Right Heart: A device wire is noted in the right heart. The  right ventricle is not well visualized; grossly normal  right ventricular systolic function. Tricuspid valve not  well visualized. Pulmonic valve not well visualized,  probably normal.  Pericardium/Pleura: Normal pericardium with trace  pericardial effusion.  Hemodynamic: Estimated right atrial pressure is 8 mm Hg.  ------------------------------------------------------------------------  Conclusions:  1. Aortic valve not well visualized; appears calcified.  Peak transaortic valve gradient equals 8 mm Hg, mean  transaortic valve gradient equals 3 mm Hg, estimated aortic  valve area equals 2 sqcm (by continuity equation), aortic  valve velocity time integral equals 22 cm, consistent with  mild aortic stenosis.  2. Endocardial visualization enhanced with intravenous  injection of Ultrasonic Enhancing Agent (Definity). Mild  left ventricular systolicdysfunction. The inferior wall,  and the inferoseptum are hypokinetic.  3. The right ventricle is not well visualized; grossly  normal right ventricular systolic function.  ------------------------------------------------------------------------  Confirmed on  4/3/2022 - 17:12:14 by BRITTANY Giraldo  ------------------------------------------------------------------------    < end of copied text >      MICROBIOLOGY:     Respiratory Viral Panel with COVID-19 by TANJA (09.14.22 @ 13:36)   Rapid RVP Result: [facility]   SARS-CoV-2: [facility]  This Respiratory Panel uses polymerase chain reaction (PCR) to detect for   adenovirus; coronavirus (HKU1, NL63, 229E, OC43); human metapneumovirus   (hMPV); human enterovirus/rhinovirus (Entero/RV); influenza A; influenza   A/H1; influenza A/H3; influenza A/H1-2009; influenza B; parainfluenza   viruses 1, 2, 3, 4; respiratory syncytial virus; Mycoplasma pneumoniae;   Chlamydophila pneumoniae; and SARS-CoV-2.       RADIOLOGY:  [x ] Chest radiographs reviewed and interpreted by me    EXAM:  XR CHEST PORTABLE URGENT 1V                          PROCEDURE DATE:  09/14/2022      FINDINGS:    Left chest wall pacemaker.  The heart size cannot be assessed on this projection.  Small left pleural effusion with associated atelectasis, mildly increased   since 4/18/2022. The right lung is clear. No pneumothorax.    IMPRESSION:  Small left pleural effusion mildly increased since 4/18/2022.    YAN DELGADO MD; Resident Radiology  This document has been electronically signed.  SATURNINO CHERRY MD; Attending Radiologist  This document has been electronically signed. Sep 14 2022  2:07PM    ---------------------------------------------------------------------------------------------------------------  [facility]                          PROCEDURE DATE:  09/14/2022      FINDINGS:    LUNGS AND AIRWAYS/PLEURA: Patent central airways.  Centrilobular   emphysema. Moderate left and small right pleural effusions with   associated partial right and complete left lower lobe compressive   atelectasis. There are patchy and nodular areas of consolidation   throughout the aerated portions of the right lung dependently, new since   May 2022 (i.e. 2:54 in the posterior right upper lobe). Other nodular   areas of consolidation in the posterior left upper lobe persist although   have improved since May 2022 (2:38).    MEDIASTINUM AND MARK: No lymphadenopathy.    VESSELS: Calcifications of the aorta and coronary arteries. No main,   right, left, lobar or segmental pulmonary embolus. Limited evaluation of   the subsegmental branches. And great vessels are normal in size.    HEART: Left chest wall dual chamber pacemaker with right atrial and right   ventricular leads. Heart size is normal. No pericardial effusion.    VISUALIZED UPPER ABDOMEN: Cholelithiasis.    BONES: Remote left posterior rib fractures.    IMPRESSION:  No pulmonary embolism.    Moderate left and small right pleural effusions, increased since May   2022, with worsening lower lobe compressive atelectasis.    New right lung patchy and nodular areas of consolidation, likely   infectious/inflammatory.    JAQUAN LARA MD; Resident Radiologist  This document has been electronically signed.  MELANY MCNAIR M.D., Attending Radiologist  This document has been electronically signed. Sep 14 2022  5:06PM  ---------------------------------------------------------------------------------------------------------------             [facility] - [facility] - CONSULT NOTE    HPI:  87 year old gentleman, former smoker, followed by Dr. Alicja Rob of our practice for asthma/COPD overlap syndrome and obstructive sleep apnea being treated conservatively. He has no history of TOM colonization/infection as listed in the "past medical history". 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 The patient was admitted in April after many months of outpatient therapy for chronic right foot wounds and leg pain. 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 Post-operative course was notable for poor wound healing (was considered for an AKA), hypoxic respiratory failure due to a COPD exacerbation, restrictive lung disease and aspiration pneumonia. He required ICU admission for an insulin infusion for steroid induced hyperglycemia, heparin gtt for PAD and encephalopathy related to narcotic analgesics for post-operative pain. The patient was found to have dysphagia but the family decided against non-oral feeding and for "pleasure feeds". The patient was discharged on May 3rd. He has been at rehab since that time. He recently was fitted for a prosthesis in hopes of starting ambulation. He has a wound on his left foot. He is sent to the ER shortly after decreasing his lasix dose due to hypernatremia with shortness of breath, hypoxemia and "gurgling" in his chest. The patient has a cough with difficulty expectorating sputum. He has chest congestion and wheeze. No fevers, chills or sweats. No chest pain/pressure or palpitations. Chest radiograph is abnormal. Asked to evaluate.    PMHX:  Chronic Obstructive Pulmonary Disease (COPD)  Asthma  Mycobacterium Avium-Intracellulare Infection (?)  Obesity (mild)  Obstructive Sleep Apnea  Deep Vein Thrombosis (DVT)  Atrial fibrillation  Sick sinus syndrome  Hypertension  Hyperlipidemia  Diabetes Mellitus  Chronic kidney disease  CVA (Cerebral Vascular Accident) x 2  CHF (congestive heart failure)  BPH (benign prostatic hypertrophy)  GERD (gastroesophageal reflux disease)  Hernia  Biliary stones    PSHX:  Hernia Repair  Cataract surgery  ERCP for biliary stent  Pacemaker implanation    FAMILY HISTORY:  no pertinent past medical history in first degree relatives    SOCIAL HISTORY:  former smoker;  - lives with his wife;     Pulmonary Medications:       Antimicrobials:  azithromycin  IVPB      azithromycin  IVPB 500 milliGRAM(s) IV Intermittent once  cefTRIAXone   IVPB          Cardiology:  furosemide   Injectable 40 milliGRAM(s) IV Push every 12 hours  metoprolol tartrate 25 milliGRAM(s) Oral two times a day      Other:  atorvastatin 80 milliGRAM(s) Oral at bedtime  dextrose 5%. 1000 milliLiter(s) IV Continuous <Continuous>  dextrose 5%. 1000 milliLiter(s) IV Continuous <Continuous>  dextrose 50% Injectable 25 Gram(s) IV Push once  dextrose 50% Injectable 12.5 Gram(s) IV Push once  dextrose 50% Injectable 25 Gram(s) IV Push once  enoxaparin Injectable 30 milliGRAM(s) SubCutaneous every 24 hours  glucagon  Injectable 1 milliGRAM(s) IntraMuscular once  insulin lispro (ADMELOG) corrective regimen sliding scale   SubCutaneous three times a day before meals      Prn:  MEDICATIONS  (PRN):  dextrose Oral Gel 15 Gram(s) Oral once PRN Blood Glucose LESS THAN 70 milliGRAM(s)/deciliter      Allergies    No Known Allergies    HOME MEDICATIONS: see  H & P    REVIEW OF SYSTEMS:  Constitutional: As per HPI  HEENT: Within normal limits  CV: As per HPI  Resp: As per HPI  GI: dysphagia  : Within normal limits  Musculoskeletal: Within normal limits  Skin: Within normal limits  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 - left facial droop - dysphagia   Psychiatric: Within normal limits  Endocrine: diabetes  Hematologic/Lymphatic: Within normal limits  Allergic/Immunologic: Within normal limits    [x] All other systems negative    OBJECTIVE:    Daily Height in cm: 172.72 (14 Sep 2022 11:55)      PHYSICAL EXAM:  ICU Vital Signs Last 24 Hrs  T(C): 36.6 (14 Sep 2022 16:23), Max: 36.9 (14 Sep 2022 11:55)  T(F): 97.8 (14 Sep 2022 16:23), Max: 98.5 (14 Sep 2022 11:55)  HR: 87 (14 Sep 2022 16:23) (77 - 87)  BP: 121/84 (14 Sep 2022 16:23) (118/69 - 137/52)  BP(mean): --  ABP: --  ABP(mean): --  RR: 21 (14 Sep 2022 16:23) (20 - 22)  SpO2: 96% (14 Sep 2022 16:23) (96% - 99%) on 3lpm nasal canula    General: Awake. Alert. Improved mentation. Cooperative. No distress. Appears stated age. Obese.   HEENT: Atraumatic. Normocephalic. Anicteric. Normal oral mucosa. PERRL. EOMI.  Neck: Supple. Trachea midline. Thyroid without enlargement/tenderness/nodules. No carotid bruit. No JVD.	  Cardiovascular: Regular rate and rhythm. Distant S1 S2. No murmurs, rubs or gallops.  Respiratory: Respirations unlabored. Bilateral rhonchi and wheeze. Decreased breath sounds left hemithorax ~ 1/3 up and at the right base with dullness to percussion. No curvature.  Abdomen: Soft. Non-tender. Non-distended. No organomegaly. No masses. Normal bowel sounds. 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 Left facial droop. A and O x 3  Psychiatry: Appropriate mood and affect.        LABS:                          7.7    9.40  )-----------( 293      ( 14 Sep 2022 13:36 )             27.5     CBC    WBC  9.40 <==    Hemoglobin  7.7 <<==    Hematocrit  27.5 <==    Platelets  293 <==      143  |  104  |  22  ----------------------------<  167<H>    09-14  4.0   |  31  |  0.89    LYTES    sodium  143 <==    potassium   4.0 <==    chloride  104 <==    carbon dioxide  31 <==    =============================================================================================  RENAL FUNCTION:    Creatinine:   0.89  <<==    BUN:   22 <==    ============================================================================================    calcium   8.2 <==    mag   1.8 <==    ============================================================================================  LFTs    AST:   12 <==     ALT:  11  <==     AP:  79  <=    Bili:  0.3  <=    PT/INR - ( 14 Sep 2022 13:36 )   PT: 23.2 sec;   INR: 2.00 ratio       PTT - ( 14 Sep 2022 13:36 )  PTT:35.0 sec    Venous Blood Gas:  09-14 @ 13:10  7.38/57/31/34/45.9  VBG Lactate: 1.2    Serum Pro-Brain Natriuretic Peptide: 2388 pg/mL (09-14 @ 13:36)    CARDIAC MARKERS ( 14 Sep 2022 16:27 )  CPK x     /CKMB x     /CKMB Units x        troponin 145 ng/L    CARDIAC MARKERS ( 14 Sep 2022 13:36 )  CPK x     /CKMB x     /CKMB Units x        troponin 154 ng/L    < from: TTE with Doppler (w/Cont) (04.03.22 @ 15:07) >    Patient name: Martir Heart  YOB: 1935   Age: 86 (M)   MR#: [id number]  Study Date: 4/3/2022  ------------------------------------------------------------------------  Dimensions:    Normal Values:  LA:     3.1    2.0 - 4.0 cm  Ao:     3.5    2.0 - 3.8 cm  SEPTUM: 1.1    0.6 - 1.2 cm  PWT:    1.3    0.6 - 1.1 cm  LVIDd:  4.3    3.0- 5.6 cm  LVIDs:  3.2    1.8 - 4.0 cm  Derived variables:  LVMI: 90 g/m2  RWT: 0.60  Fractional short: 26 %  EF (Visual Estimate): NWV %  Doppler Peak Velocity (m/sec): MV=1.6 AoV=1.4  ------------------------------------------------------------------------  Observations:  Mitral Valve: Mitral valve not well visualized. Mitral  annular calcification. Peak mitral valve gradient equals 10  mm Hg, mean transmitral valve gradient equals 4 mm Hg,  consistent with mild mitral stenosis.  Aortic Valve/Aorta: Aortic valve not well visualized;  appears calcified. Peak transaortic valve gradient equals 8  mm Hg, mean transaortic valve gradient equals 3 mm Hg,  estimated aortic valve area equals 2 sqcm (by continuity  equation), aortic valve velocity time integral equals 22  cm, consistent with mild aortic stenosis. Peak left  ventricular outflow tract gradient equals 3 mm Hg, mean  gradient is equal to 1 mm Hg, LVOT velocity time integral  equals 12 cm.  Aortic Root: 3.5 cm.  Left Atrium: Normal left atrium.  Left Ventricle: Endocardial visualization enhanced with  intravenous injection of Ultrasonic Enhancing Agent  (Definity). Mild left ventricular systolic dysfunction. The  inferior wall, and the inferoseptum are hypokinetic.  Normal left ventricular internal dimensions and wall  thicknesses. Unable to evaluate diastology.  Right Heart: A device wire is noted in the right heart. The  right ventricle is not well visualized; grossly normal  right ventricular systolic function. Tricuspid valve not  well visualized. Pulmonic valve not well visualized,  probably normal.  Pericardium/Pleura: Normal pericardium with trace  pericardial effusion.  Hemodynamic: Estimated right atrial pressure is 8 mm Hg.  ------------------------------------------------------------------------  Conclusions:  1. Aortic valve not well visualized; appears calcified.  Peak transaortic valve gradient equals 8 mm Hg, mean  transaortic valve gradient equals 3 mm Hg, estimated aortic  valve area equals 2 sqcm (by continuity equation), aortic  valve velocity time integral equals 22 cm, consistent with  mild aortic stenosis.  2. Endocardial visualization enhanced with intravenous  injection of Ultrasonic Enhancing Agent (Definity). Mild  left ventricular systolic dysfunction. The inferior wall,  and the inferoseptum are hypokinetic.  3. The right ventricle is not well visualized; grossly  normal right ventricular systolic function.  ------------------------------------------------------------------------  Confirmed on  4/3/2022 - 17:12:14 by BRITTANY Giraldo  ------------------------------------------------------------------------  ---------------------------------------------------------------------------------------------------------------    MICROBIOLOGY:     Respiratory Viral Panel with COVID-19 by TANJA (09.14.22 @ 13:36)   Rapid RVP Result: [facility]   SARS-CoV-2: [facility]  This Respiratory Panel uses polymerase chain reaction (PCR) to detect for   adenovirus; coronavirus (HKU1, NL63, 229E, OC43); human metapneumovirus   (hMPV); human enterovirus/rhinovirus (Entero/RV); influenza A; influenza   A/H1; influenza A/H3; influenza A/H1-2009; influenza B; parainfluenza   viruses 1, 2, 3, 4; respiratory syncytial virus; Mycoplasma pneumoniae;   Chlamydophila pneumoniae; and SARS-CoV-2.       RADIOLOGY:  [x ] Chest radiographs reviewed and interpreted by me    EXAM:  XR CHEST PORTABLE URGENT 1V                          PROCEDURE DATE:  09/14/2022      FINDINGS:    Left chest wall pacemaker.  The heart size cannot be assessed on this projection.  Small left pleural effusion with associated atelectasis, mildly increased   since 4/18/2022. The right lung is clear. No pneumothorax.    IMPRESSION:  Small left pleural effusion mildly increased since 4/18/2022.    YAN DELGADO MD; Resident Radiology  This document has been electronically signed.  SATURNINO CHERRY MD; Attending Radiologist  This document has been electronically signed. Sep 14 2022  2:07PM    ---------------------------------------------------------------------------------------------------------------  [facility]                          PROCEDURE DATE:  09/14/2022      FINDINGS:    LUNGS AND AIRWAYS/PLEURA: Patent central airways.  Centrilobular   emphysema. Moderate left and small right pleural effusions with   associated partial right and complete left lower lobe compressive   atelectasis. There are patchy and nodular areas of consolidation   throughout the aerated portions of the right lung dependently, new since   May 2022 (i.e. 2:54 in the posterior right upper lobe). Other nodular   areas of consolidation in the posterior left upper lobe persist although   have improved since May 2022 (2:38).    MEDIASTINUM AND MARK: No lymphadenopathy.    VESSELS: Calcifications of the aorta and coronary arteries. No main,   right, left, lobar or segmental pulmonary embolus. Limited evaluation of   the subsegmental branches. And great vessels are normal in size.    HEART: Left chest wall dual chamber pacemaker with right atrial and right   ventricular leads. Heart size is normal. No pericardial effusion.    VISUALIZED UPPER ABDOMEN: Cholelithiasis.    BONES: Remote left posterior rib fractures.    IMPRESSION:  No pulmonary embolism.    Moderate left and small right pleural effusions, increased since May   2022, with worsening lower lobe compressive atelectasis.    New right lung patchy and nodular areas of consolidation, likely   infectious/inflammatory.    JAQUAN LARA MD; Resident Radiologist  This document has been electronically signed.  MELANY MCNAIR M.D., Attending Radiologist  This document has been electronically signed. Sep 14 2022  5:06PM  ---------------------------------------------------------------------------------------------------------------

## 2022-09-15 NOTE — CONSULT NOTE ADULT - SUBJECTIVE AND OBJECTIVE BOX
CARDIOLOGY CONSULT - Dr. 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 Pt went to vascular Dr. Quinn's office today for routine appt for L foot wounds, in office was found to be short of breath and doctor heard gurgling cough and called EMS. Per daughter at bedside feels his shortness of breath has gotten worse since his lasix was decreased 2 days ago. Pt states he always has shortness of breath, feels it's slightly worse than baseline. Daughter states left leg swelling is better than it normally is. Cough is slightly productive with clear sputum, no discolored phlegm. 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 . Mild left ventricular systolic dysfunction. The inferior wall, and the inferoseptum are hypokinetic.    MEDICATIONS:  Home Medications:  albuterol 2.5 mg/3 mL (0.083%) inhalation solution: 3 milliliter(s) inhaled every 6 hours (14 Sep 2022 19:13)  aspirin 81 mg oral delayed release tablet: 1 tab(s) orally once a day (14 Sep 2022 19:13)  bacitracin 500 units/g topical ointment: Apply topically to affected area 2 times a day (14 Sep 2022 19:13)  budesonide 0.5 mg/2 mL inhalation suspension: 0.5 milligram(s) inhaled 2 times a day (14 Sep 2022 19:13)  Coumadin 5 mg oral tablet: 1 tab(s) orally once a day (at bedtime) (14 Sep 2022 19:13)  Crestor 10 mg oral tablet: 1 tab(s) orally once a day (at bedtime) (14 Sep 2022 19:13)  finasteride 5 mg oral tablet: 1 tab(s) orally once a day (14 Sep 2022 19:13)  furosemide 20 mg oral tablet: 1 tab(s) orally once a day (14 Sep 2022 19:13)  gabapentin 100 mg oral tablet: 1 tab(s) orally 2 times a day (14 Sep 2022 19:13)  glycopyrrolate 1 mg oral tablet: 1 tab(s) orally 2 times a day (14 Sep 2022 19:13)  insulin lispro 100 units/mL injectable solution: 3 unit(s) injectable 3 times a day (before meals) (14 Sep 2022 19:13)  ipratropium 500 mcg/2.5 mL inhalation solution: 2.5 milliliter(s) inhaled 4 times a day (14 Sep 2022 19:13)  Levemir FlexTouch 100 units/mL subcutaneous solution: 30 unit(s) subcutaneous once a day (at bedtime) (14 Sep 2022 19:13)  losartan 100 mg oral tablet: 1 tab(s) orally once a day (14 Sep 2022 19:13)  metoprolol succinate 25 mg oral tablet, extended release: 1 tab(s) orally once a day (14 Sep 2022 19:13)  montelukast 10 mg oral tablet: 1 tab(s) orally once a day (14 Sep 2022 19:13)  Multiple Vitamins with Minerals oral tablet: 1 tab(s) orally once a day (14 Sep 2022 19:13)  pantoprazole 40 mg oral delayed release tablet: 1 tab(s) orally once a day (14 Sep 2022 19:13)  polyethylene glycol 3350 oral powder for reconstitution: 17 gram(s) orally once a day (14 Sep 2022 19:13)  Santyl 250 units/g topical ointment: Apply topically to affected area 2 times a day (14 Sep 2022 19:13)  senna oral tablet: 2 tab(s) orally once a day (at bedtime) (14 Sep 2022 19:13)  Synthroid 25 mcg (0.025 mg) oral tablet: 1 tab(s) orally once a day (14 Sep 2022 19:13)  tamsulosin 0.4 mg oral capsule: 2 cap(s) orally once a day (at bedtime) (14 Sep 2022 19:13)      MEDICATIONS  (STANDING):  albuterol/ipratropium for Nebulization 3 milliLiter(s) Nebulizer every 6 hours  aspirin enteric coated 81 milliGRAM(s) Oral daily  atorvastatin 80 milliGRAM(s) Oral at bedtime  buDESOnide    Inhalation Suspension 0.5 milliGRAM(s) Inhalation two times a day  dextrose 5%. 1000 milliLiter(s) (100 mL/Hr) IV Continuous <Continuous>  dextrose 5%. 1000 milliLiter(s) (50 mL/Hr) IV Continuous <Continuous>  dextrose 50% Injectable 25 Gram(s) IV Push once  dextrose 50% Injectable 12.5 Gram(s) IV Push once  dextrose 50% Injectable 25 Gram(s) IV Push once  doxycycline IVPB      doxycycline IVPB 100 milliGRAM(s) IV Intermittent every 12 hours  finasteride 5 milliGRAM(s) Oral daily  furosemide   Injectable 40 milliGRAM(s) IV Push every 12 hours  gabapentin 100 milliGRAM(s) Oral two times a day  glucagon  Injectable 1 milliGRAM(s) IntraMuscular once  guaifenesin/dextromethorphan Oral Liquid 10 milliLiter(s) Oral every 6 hours  insulin glargine Injectable (LANTUS) 20 Unit(s) SubCutaneous at bedtime  insulin lispro (ADMELOG) corrective regimen sliding scale   SubCutaneous three times a day before meals  levothyroxine 25 MICROGram(s) Oral daily  losartan 100 milliGRAM(s) Oral daily  methylPREDNISolone sodium succinate Injectable 20 milliGRAM(s) IV Push every 6 hours  metoprolol tartrate 25 milliGRAM(s) Oral two times a day  montelukast 10 milliGRAM(s) Oral at bedtime  multivitamin/minerals 1 Tablet(s) Oral daily  pantoprazole    Tablet 40 milliGRAM(s) Oral before breakfast  piperacillin/tazobactam IVPB.. 3.375 Gram(s) IV Intermittent every 8 hours  polyethylene glycol 3350 17 Gram(s) Oral daily  senna 2 Tablet(s) Oral at bedtime  tamsulosin 0.8 milliGRAM(s) Oral at bedtime      FAMILY HISTORY:      SOCIAL HISTORY:    [ ] Non-smoker  [ ] Smoker  [ ] Alcohol    Allergies    No Known Allergies    Intolerances    	    REVIEW OF SYSTEMS:  CONSTITUTIONAL: No fever, weight loss, or fatigue  EYES: No eye pain, visual disturbances, or discharge  ENMT:  No difficulty hearing, tinnitus, vertigo; No sinus or throat pain  NECK: No pain or stiffness  RESPIRATORY: No cough, wheezing, chills or hemoptysis; No Shortness of Breath  CARDIOVASCULAR: No chest pain, palpitations, passing out, dizziness, or leg swelling  GASTROINTESTINAL: No abdominal or epigastric pain. No nausea, vomiting, or hematemesis; No diarrhea or constipation. No melena or hematochezia.  GENITOURINARY: No dysuria, frequency, hematuria, or incontinence  NEUROLOGICAL: No headaches, memory loss, loss of strength, numbness, or tremors  SKIN: No itching, burning, rashes, or lesions   	    [ ] All others negative	  [ ] Unable to obtain    PHYSICAL EXAM:  T(C): 36.7 (09-15-22 @ 05:00), Max: 36.9 (09-14-22 @ 11:55)  HR: 66 (09-15-22 @ 05:00) (66 - 87)  BP: 118/66 (09-15-22 @ 05:00) (117/73 - 137/52)  RR: 20 (09-15-22 @ 05:00) (20 - 22)  SpO2: 94% (09-15-22 @ 05:00) (88% - 99%)  Wt(kg): --  I&O's Summary      Appearance: Normal	  Psychiatry: A & O x 3, Mood & affect appropriate  HEENT:   Normal oral mucosa, PERRL, EOMI	  Lymphatic: No lymphadenopathy  Cardiovascular: Normal S1 S2,RRR, No JVD, No murmurs  Respiratory: Lungs clear to auscultation	  Gastrointestinal:  Soft, Non-tender, + BS	  Skin: No rashes, No ecchymoses, No cyanosis	  Neurologic: Non-focal  Extremities: Normal range of motion, No clubbing, cyanosis or edema  Vascular: Peripheral pulses palpable 2+ bilaterally    TELEMETRY: 	    ECG:  	  RADIOLOGY:  CT Angio Chest PE Protocol w/ IV Cont (09.14.22 @ 16:23) >    FINDINGS:    LUNGS AND AIRWAYS/PLEURA: Patent central airways.  Centrilobular   emphysema. Moderate left and small right pleural effusions with   associated partial right and complete left lower lobe compressive   atelectasis. There are patchy and nodular areas of consolidation   throughout the aerated portions of the right lung dependently, new since   May 2022 (i.e. 2:54 in the posterior right upper lobe). Other nodular   areas of consolidation in the posterior left upper lobe persist although   have improved since May 2022 (2:38).    MEDIASTINUM AND MARK: No lymphadenopathy.    VESSELS: Calcifications of the aorta and coronary arteries. No main,   right, left, lobar orsegmental pulmonary embolus. Limited evaluation of   the subsegmental branches. And great vessels are normal in size.    HEART: Left chest wall dual chamber pacemaker with right atrial and right   ventricular leads. Heart size is normal. No pericardial effusion.    VISUALIZED UPPER ABDOMEN: Cholelithiasis.    BONES: Remote left posterior rib fractures.    IMPRESSION:  No pulmonary embolism.    Moderate left and small right pleural effusions, increased since May   2022, with worsening lower lobe compressive atelectasis.    New right lung patchy and nodular areas of consolidation, likely   infectious/inflammatory.      < end of copied text >      OTHER: 	  	  LABS:	 	    CARDIAC MARKERS:  Troponin T, High Sensitivity Result: 165 ng/L (09-15 @ 07:22)  Troponin T, High Sensitivity Result: 145 ng/L (09-14 @ 16:27)  Troponin T, High Sensitivity Result: 154 ng/L (09-14 @ 13:36)                                  8.5    7.73  )-----------( 293      ( 15 Sep 2022 07:22 )             30.1     09-15    140  |  102  |  21  ----------------------------<  190<H>  4.1   |  27  |  0.95    Ca    8.7      15 Sep 2022 07:22  Mg     1.8     09-14    TPro  6.0  /  Alb  2.6<L>  /  TBili  0.3  /  DBili  x   /  AST  12  /  ALT  11  /  AlkPhos  79  09-14    PT/INR - ( 14 Sep 2022 13:36 )   PT: 23.2 sec;   INR: 2.00 ratio         PTT - ( 14 Sep 2022 13:36 )  PTT:35.0 sec  proBNP: Serum Pro-Brain Natriuretic Peptide: 2388 pg/mL (09-14 @ 13:36)    Lipid Profile:   HgA1c:   TSH:        CARDIOLOGY CONSULT - Dr. 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 Pt went to vascular Dr. Quinn's office today for routine appt for L foot wounds, in office was found to be short of breath and doctor heard gurgling cough and called EMS. per h/p-- Per daughter at bedside feels his shortness of breath has gotten worse since his lasix was decreased 2 days ago. Pt states he always has shortness of breath, feels it's slightly worse than baseline. Daughter states left leg swelling is better than it normally is. Cough is slightly productive with clear sputum, no discolored phlegm. Denies chest pain, abdominal pain, n/v/d, fever/chills, weakness, headache, dizziness, lightheadedness. Echo 4/2022 mild aortic stenosis. . Mild left ventricular systolic dysfunction. The inferior wall, and the inferoseptum are hypokinetic.      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 . Mild left ventricular systolic dysfunction. The inferior wall, and the inferoseptum are hypokinetic.    MEDICATIONS:  Home Medications:  albuterol 2.5 mg/3 mL (0.083%) inhalation solution: 3 milliliter(s) inhaled every 6 hours (14 Sep 2022 19:13)  aspirin 81 mg oral delayed release tablet: 1 tab(s) orally once a day (14 Sep 2022 19:13)  bacitracin 500 units/g topical ointment: Apply topically to affected area 2 times a day (14 Sep 2022 19:13)  budesonide 0.5 mg/2 mL inhalation suspension: 0.5 milligram(s) inhaled 2 times a day (14 Sep 2022 19:13)  Coumadin 5 mg oral tablet: 1 tab(s) orally once a day (at bedtime) (14 Sep 2022 19:13)  Crestor 10 mg oral tablet: 1 tab(s) orally once a day (at bedtime) (14 Sep 2022 19:13)  finasteride 5 mg oral tablet: 1 tab(s) orally once a day (14 Sep 2022 19:13)  furosemide 20 mg oral tablet: 1 tab(s) orally once a day (14 Sep 2022 19:13)  gabapentin 100 mg oral tablet: 1 tab(s) orally 2 times a day (14 Sep 2022 19:13)  glycopyrrolate 1 mg oral tablet: 1 tab(s) orally 2 times a day (14 Sep 2022 19:13)  insulin lispro 100 units/mL injectable solution: 3 unit(s) injectable 3 times a day (before meals) (14 Sep 2022 19:13)  ipratropium 500 mcg/2.5 mL inhalation solution: 2.5 milliliter(s) inhaled 4 times a day (14 Sep 2022 19:13)  Levemir FlexTouch 100 units/mL subcutaneous solution: 30 unit(s) subcutaneous once a day (at bedtime) (14 Sep 2022 19:13)  losartan 100 mg oral tablet: 1 tab(s) orally once a day (14 Sep 2022 19:13)  metoprolol succinate 25 mg oral tablet, extended release: 1 tab(s) orally once a day (14 Sep 2022 19:13)  montelukast 10 mg oral tablet: 1 tab(s) orally once a day (14 Sep 2022 19:13)  Multiple Vitamins with Minerals oral tablet: 1 tab(s) orally once a day (14 Sep 2022 19:13)  pantoprazole 40 mg oral delayed release tablet: 1 tab(s) orally once a day (14 Sep 2022 19:13)  polyethylene glycol 3350 oral powder for reconstitution: 17 gram(s) orally once a day (14 Sep 2022 19:13)  Santyl 250 units/g topical ointment: Apply topically to affected area 2 times a day (14 Sep 2022 19:13)  senna oral tablet: 2 tab(s) orally once a day (at bedtime) (14 Sep 2022 19:13)  Synthroid 25 mcg (0.025 mg) oral tablet: 1 tab(s) orally once a day (14 Sep 2022 19:13)  tamsulosin 0.4 mg oral capsule: 2 cap(s) orally once a day (at bedtime) (14 Sep 2022 19:13)      MEDICATIONS  (STANDING):  albuterol/ipratropium for Nebulization 3 milliLiter(s) Nebulizer every 6 hours  aspirin enteric coated 81 milliGRAM(s) Oral daily  atorvastatin 80 milliGRAM(s) Oral at bedtime  buDESOnide    Inhalation Suspension 0.5 milliGRAM(s) Inhalation two times a day  dextrose 5%. 1000 milliLiter(s) (100 mL/Hr) IV Continuous <Continuous>  dextrose 5%. 1000 milliLiter(s) (50 mL/Hr) IV Continuous <Continuous>  dextrose 50% Injectable 25 Gram(s) IV Push once  dextrose 50% Injectable 12.5 Gram(s) IV Push once  dextrose 50% Injectable 25 Gram(s) IV Push once  doxycycline IVPB      doxycycline IVPB 100 milliGRAM(s) IV Intermittent every 12 hours  finasteride 5 milliGRAM(s) Oral daily  furosemide   Injectable 40 milliGRAM(s) IV Push every 12 hours  gabapentin 100 milliGRAM(s) Oral two times a day  glucagon  Injectable 1 milliGRAM(s) IntraMuscular once  guaifenesin/dextromethorphan Oral Liquid 10 milliLiter(s) Oral every 6 hours  insulin glargine Injectable (LANTUS) 20 Unit(s) SubCutaneous at bedtime  insulin lispro (ADMELOG) corrective regimen sliding scale   SubCutaneous three times a day before meals  levothyroxine 25 MICROGram(s) Oral daily  losartan 100 milliGRAM(s) Oral daily  methylPREDNISolone sodium succinate Injectable 20 milliGRAM(s) IV Push every 6 hours  metoprolol tartrate 25 milliGRAM(s) Oral two times a day  montelukast 10 milliGRAM(s) Oral at bedtime  multivitamin/minerals 1 Tablet(s) Oral daily  pantoprazole    Tablet 40 milliGRAM(s) Oral before breakfast  piperacillin/tazobactam IVPB.. 3.375 Gram(s) IV Intermittent every 8 hours  polyethylene glycol 3350 17 Gram(s) Oral daily  senna 2 Tablet(s) Oral at bedtime  tamsulosin 0.8 milliGRAM(s) Oral at bedtime      FAMILY HISTORY:      SOCIAL HISTORY:    [x ] Non-smoker  [ ] Smoker  [ ] Alcohol    Allergies    No Known Allergies    Intolerances    	    REVIEW OF SYSTEMS:  CONSTITUTIONAL: No fever, weight loss, or fatigue  EYES: No eye pain, visual disturbances, or discharge  ENMT:  No difficulty hearing, tinnitus, vertigo; No sinus or throat pain  NECK: No pain or stiffness  RESPIRATORY: see hpi   CARDIOVASCULAR: No chest pain, palpitations, passing out, dizziness, or leg swelling  GASTROINTESTINAL: No abdominal or epigastric pain. No nausea, vomiting, or hematemesis; No diarrhea or constipation. No melena or hematochezia.  GENITOURINARY: No dysuria, frequency, hematuria, or incontinence  NEUROLOGICAL: No headaches, memory loss, loss of strength, numbness, or tremors  SKIN: No itching, burning, rashes, or lesions   	    [ x] All others negative	  [ ] Unable to obtain    PHYSICAL EXAM:  T(C): 36.7 (09-15-22 @ 05:00), Max: 36.9 (09-14-22 @ 11:55)  HR: 66 (09-15-22 @ 05:00) (66 - 87)  BP: 118/66 (09-15-22 @ 05:00) (117/73 - 137/52)  RR: 20 (09-15-22 @ 05:00) (20 - 22)  SpO2: 94% (09-15-22 @ 05:00) (88% - 99%)  Wt(kg): --  I&O's Summary      Appearance: Normal	  Psychiatry: A & O x 3, Mood & affect appropriate  HEENT:   Normal oral mucosa, PERRL, EOMI	  Lymphatic: No lymphadenopathy  Cardiovascular: Normal S1 S2,RR  Respiratory:  rhonchi  Gastrointestinal:  Soft, Non-tender, + BS	  Skin: No rashes, No ecchymoses, No cyanosis	  Neurologic: Non-focal  Extremities: bka    TELEMETRY: 	    ECG:  	PENIDNG  RADIOLOGY:  CT Angio Chest PE Protocol w/ IV Cont (09.14.22 @ 16:23) >    FINDINGS:    LUNGS AND AIRWAYS/PLEURA: Patent central airways.  Centrilobular   emphysema. Moderate left and small right pleural effusions with   associated partial right and complete left lower lobe compressive   atelectasis. There are patchy and nodular areas of consolidation   throughout the aerated portions of the right lung dependently, new since   May 2022 (i.e. 2:54 in the posterior right upper lobe). Other nodular   areas of consolidation in the posterior left upper lobe persist although   have improved since May 2022 (2:38).    MEDIASTINUM AND MARK: No lymphadenopathy.    VESSELS: Calcifications of the aorta and coronary arteries. No main,   right, left, lobar orsegmental pulmonary embolus. Limited evaluation of   the subsegmental branches. And great vessels are normal in size.    HEART: Left chest wall dual chamber pacemaker with right atrial and right   ventricular leads. Heart size is normal. No pericardial effusion.    VISUALIZED UPPER ABDOMEN: Cholelithiasis.    BONES: Remote left posterior rib fractures.    IMPRESSION:  No pulmonary embolism.    Moderate left and small right pleural effusions, increased since May   2022, with worsening lower lobe compressive atelectasis.    New right lung patchy and nodular areas of consolidation, likely   infectious/inflammatory.      < end of copied text >      OTHER: 	  	  LABS:	 	    CARDIAC MARKERS:  Troponin T, High Sensitivity Result: 165 ng/L (09-15 @ 07:22)  Troponin T, High Sensitivity Result: 145 ng/L (09-14 @ 16:27)  Troponin T, High Sensitivity Result: 154 ng/L (09-14 @ 13:36)                                  8.5    7.73  )-----------( 293      ( 15 Sep 2022 07:22 )             30.1     09-15    140  |  102  |  21  ----------------------------<  190<H>  4.1   |  27  |  0.95    Ca    8.7      15 Sep 2022 07:22  Mg     1.8     09-14    TPro  6.0  /  Alb  2.6<L>  /  TBili  0.3  /  DBili  x   /  AST  12  /  ALT  11  /  AlkPhos  79  09-14    PT/INR - ( 14 Sep 2022 13:36 )   PT: 23.2 sec;   INR: 2.00 ratio         PTT - ( 14 Sep 2022 13:36 )  PTT:35.0 sec  proBNP: Serum Pro-Brain Natriuretic Peptide: 2388 pg/mL (09-14 @ 13:36)    Lipid Profile:   HgA1c:   TSH:

## 2022-09-15 NOTE — ED ADULT NURSE REASSESSMENT NOTE - NS ED NURSE REASSESS COMMENT FT1
Following duoneb treatment, pt now found to be sating 88% on 1L NC while laying flat. Pt sat up in bed, coughing and deep breathing exercises encouraged. 02 increased to 3L NC. Pt denies any SOB, states he feels fine. Pt now sating 95% on 3L NC. Admitting ACP Rafa made aware. Holding RN that has already received handoff report made aware as well.

## 2022-09-15 NOTE — CONSULT NOTE ADULT - TIME BILLING
Patient seen and examined.  Agree with above NP note.  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, insulin-dependent diabetes, sys CHF on lasix (recently decreased dose 2 days ago 2/2 hypernatremia), presents with shortness of breath    #SOB   -multifactorial secondary to pna, acute chf, chronic lung disease, possible aspiration component  -cta chest with no pe, Moderate left and small right pleural effusions, New right lung patchy and nodular areas of consolidation  -Pulm fu  -IV steroids, IV abx  -c/w IV diuretics   -echo noted     #acute on chronic systolic CHF   -overloaded on exam   -c/w lasix 40 mg IVP BID   -echo with moderate severe lv dysfxn, unchanged from [facility] 4/2022  -continue medical management of CMP/HF  -no ACS  -continue bb  -d/c arb  -start entresto lc    #Pafib, sp PPM   -Please call EP to interrogate PPM   -Resume AC once cleared sp thoracentesis   -c/w BB     #mild as   -echo noted

## 2022-09-15 NOTE — ED ADULT NURSE REASSESSMENT NOTE - NS ED NURSE REASSESS COMMENT FT1
Patient complaining of pain to his foot, requesting tylenol for management. Admitting ACP Rafa contacted and made aware.  pending orders.

## 2022-09-15 NOTE — CONSULT NOTE ADULT - ASSESSMENT
TTE with Doppler (w/Cont) (04.03.22 @ 15:07) >  mild aortic stenosis. . Mild left ventricular systolic dysfunction. The inferior wall, and the inferoseptum are hypokinetic.    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 chf   -cta chest with no pe, Moderate left and small right pleural effusions, increased since May  2022, with worsening lower lobe compressive atelectasis. New right lung patchy and nodular areas of consolidation, likely  infectious/inflammatory  -Pulm fu, IV steroids, IV abx, plan for Thoracentesis ?   -c/w IV diuretics   -Check echo     # acute on chronic systolic CHF   -c/w lasix 40 mg IVP BID   -check echo   -fu ecg, HS trop elevated secondary to hypoxia/ chf/ resp infection   - add ck ckmb   -c/w bb arb     #Pafib, sp PPM   -Please call EP to interrogate PPM   -Resume AC once cleared sp thoracentesis   -c/w BB     #mild as   -recheck echo              TTE with Doppler (w/Cont) (04.03.22 @ 15:07) >  mild aortic stenosis. . Mild left ventricular systolic dysfunction. The inferior wall, and the inferoseptum are hypokinetic.    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 chf   -cta chest with no pe, Moderate left and small right pleural effusions, increased since May  2022, with worsening lower lobe compressive atelectasis. New right lung patchy and nodular areas of consolidation, likely  infectious/inflammatory  -Pulm fu, IV steroids, IV abx, plan for Thoracentesis ?   -c/w IV diuretics   -Check echo     # acute on chronic systolic CHF   -c/w lasix 40 mg IVP BID   -check echo   -fu ecg, HS trop elevated secondary to hypoxia/ chf/ resp infection   - add ck ckmb   -c/w bb arb . consider change to entresto    #Pafib, sp PPM   -Please call EP to interrogate PPM   -Resume AC once cleared sp thoracentesis   -c/w BB     #mild as   -recheck echo

## 2022-09-15 NOTE — PROGRESS NOTE ADULT - ASSESSMENT
88 yo male w h/o asthma/COPD, ZACKARY, HTN, HLD, DM, CKD, CVA with residual left sided weakness and left facial droop, CHF (mild systolic dysfunction) and atrial fibrillation with SSS s/p PPM.   PMH earlier this year: The patient was admitted in April after many months of outpatient therapy for chronic right foot wounds and leg pain. 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   Post-operative course was notable for poor wound healing (was considered for an AKA), hypoxic respiratory failure due to a COPD exacerbation, restrictive lung disease and aspiration pneumonia. He required ICU admission for an insulin infusion for steroid induced hyperglycemia, heparin gtt for PAD and encephalopathy related to narcotic analgesics for post-operative pain.   The patient was found to have dysphagia but the family decided against non-oral feeding and opted for "pleasure feeds".   The patient was discharged on May 3rd.   He has been at rehab since that time. He recently was fitted for a prosthesis in hopes of starting ambulation.   He has a wound on his left foot.   His LAsix was lowered 2/2 hypernatremia few days PTA  He is sent to the ER with shortness of breath, hypoxemia and "gurgling" in his chest.   The patient has a cough with difficulty expectorating sputum. He has chest congestion and wheeze. No fevers, chills or sweats. No chest pain/pressure or palpitations. Chest radiograph is abnormal.    CT -> patent central airways - centrilobular emphysema - moderate left and small right pleural effusions with associated partial right and complete left lower lobe compressive atelectasis - patchy and nodular areas of consolidation throughout the aerated portions of the right lung dependently - other nodular areas of consolidation in the posterior left upper lobe persist although have improved    multifactorial hypoxemia and chronic hypercapnic respiratory failure  1) COPD/emphysema with exacerbation  2) bilateral left > right pleural effusions with associated atelectasis due to ischemic and valvular heart disease  3) 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 and respiratory muscle weakness  4) Aspiration PNA  5) acute on chronic systolic CHF    ptn was seen by cardiology and pulmonary    on  3lpm nasal canula  right thoracenteses done today by pulm: 1 liter removed w resolution of dyspnea  as per pulm: the left effusion seems to small to "tap" safely  zosyn/doxycycline - await blood, urine and sputum culture  solumedrol 20mg IVP q6h  albuterol/atrovent nebs q6h  pulmicort 0.5mg nebs q12h - give after duoneb - rinse mouth after use  robitussin DM 300mg 4 times daily  singulair 10mg @ bedtime  acapella device/incentive spirometer  cardiac meds: lipitor/losartan/metoprolol/IV lasix 40 mg q12H  strict is and os  daily weights  check TTE  EP to interrogate PPM  Afib on Coumadin  endo consult for regulating blood glucose  proscar/flomax - hudson catheter is in place  GI/DVT prophylaxis  bowel regimen

## 2022-09-16 NOTE — PROGRESS NOTE ADULT - ASSESSMENT
88 yo male w h/o asthma/COPD, ZACKARY, HTN, HLD, DM, CKD, CVA with residual left sided weakness and left facial droop, CHF (mild systolic dysfunction) and atrial fibrillation with SSS s/p PPM.   PMH earlier this year: The patient was admitted in April after many months of outpatient therapy for chronic right foot wounds and leg pain. 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   Post-operative course was notable for poor wound healing (was considered for an AKA), hypoxic respiratory failure due to a COPD exacerbation, restrictive lung disease and aspiration pneumonia. He required ICU admission for an insulin infusion for steroid induced hyperglycemia, heparin gtt for PAD and encephalopathy related to narcotic analgesics for post-operative pain.   The patient was found to have dysphagia but the family decided against non-oral feeding and opted for "pleasure feeds".   The patient was discharged on May 3rd.   He has been at rehab since that time. He recently was fitted for a prosthesis in hopes of starting ambulation.   He has a wound on his left foot.   His LAsix was lowered 2/2 hypernatremia few days PTA  He is sent to the ER with shortness of breath, hypoxemia and "gurgling" in his chest.   The patient has a cough with difficulty expectorating sputum. He has chest congestion and wheeze. No fevers, chills or sweats. No chest pain/pressure or palpitations. Chest radiograph is abnormal.    CT -> patent central airways - centrilobular emphysema - moderate left and small right pleural effusions with associated partial right and complete left lower lobe compressive atelectasis - patchy and nodular areas of consolidation throughout the aerated portions of the right lung dependently - other nodular areas of consolidation in the posterior left upper lobe persist although have improved    multifactorial hypoxemia and chronic hypercapnic respiratory failure  1) COPD/emphysema with exacerbation  2) bilateral left > right pleural effusions with associated atelectasis due to ischemic and valvular heart disease  3) 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 and respiratory muscle weakness  4) Aspiration PNA  5) acute on chronic systolic CHF    ptn was seen by cardiology and pulmonary    on  3lpm nasal canula  right thoracenteses done today by pulm: 1 liter removed w resolution of dyspnea  as per pulm: the left effusion seems to small to "tap" safely  zosyn/doxycycline - all cx NTD  solumedrol 20mg IVP q6h  albuterol/atrovent nebs q6h  pulmicort 0.5mg nebs q12h - give after duoneb - rinse mouth after use  robitussin DM 300mg 4 times daily  singulair 10mg @ bedtime  acapella device/incentive spirometer  cardiac meds: lipitor/losartan/metoprolol/IV lasix 40 mg q12H  strict is and os  daily weights  TTE is stable  EP to interrogate PPM  Afib on Coumadin  endo consult for regulating blood glucose  proscar/flomax - hudson catheter is in place  GI/DVT prophylaxis  bowel regimen

## 2022-09-16 NOTE — PROGRESS NOTE ADULT - TIME BILLING
agree with the above assessment and plan by BLANCA Blount.  cv stable  feeling better  Pulm fu, IV steroids, IV abx,  sp thoracentesis 9/15   c/w IV diuretics   repeat echo with moderate severe lv dysfxn, unchanged from echo  4/2022  continue medical management of CMP/HF  c/w bb / entresto   Please call EP to interrogate PPM

## 2022-09-16 NOTE — CONSULT NOTE ADULT - ASSESSMENT
86 y/o male pt with left heel discoloration   - pt seen and evaluated  - left foot lateral 5th MPJ ulcer with no signs of infection  - rec daily dressing changes with betadine and allevyn foam  - rec cont with z flow boot in bed at all times  - will monitor periodically for any signs of worsening during this admission

## 2022-09-16 NOTE — CONSULT NOTE ADULT - SUBJECTIVE AND OBJECTIVE BOX
Patient is a 87y old  Male who presents with a chief complaint of acute rayshawn chronic diastolic CHF exacerbation, Pneumonia (16 Sep 2022 16:40)      HPI:  86 yo male pmhx BPH, PAD s/p R BKA, COPD (not on home O2), HTN, HLD, afib, CVA w/ residual L hemiparesis on coumadin, insulin-dependent diabetes, CHF on lasix (recently decreased dose 2 days ago 2/2 hypernatremia), presents to the ED c/o increasing shortness of breath and "gurgling" sounds in chest. Pt went to vascular Dr. Quinn's office today for routine appt for L foot wounds, in office was found to be short of breath and doctor heard gurgling cough and called EMS. Per daughter at bedside feels his shortness of breath has gotten worse since his lasix was decreased 2 days ago. Pt states he always has shortness of breath, feels it's slightly worse than baseline. Daughter states left leg swelling is better than it normally is. Cough is slightly productive with clear sputum, no discolored phlegm. Denies chest pain, abdominal pain, n/v/d, fever/chills, weakness, headache, dizziness, lightheadedness. 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      Deep Vein Thrombosis (DVT)  17 yeras ago on Coumadin      CHF (congestive heart failure)  last exacerbation in 1/2017      Enlarged prostate      GERD (gastroesophageal reflux disease)      Hernia  umblical      Calculus of bile duct without cholangitis or cholecystitis without obstruction      Atrial fibrillation      S/P Hernia Repair      S/P cataract surgery, unspecified laterality      S/P ERCP  3/2017          MEDICATIONS  (STANDING):  albuterol/ipratropium for Nebulization 3 milliLiter(s) Nebulizer every 6 hours  aspirin enteric coated 81 milliGRAM(s) Oral daily  atorvastatin 80 milliGRAM(s) Oral at bedtime  buDESOnide    Inhalation Suspension 0.5 milliGRAM(s) Inhalation two times a day  dextrose 5%. 1000 milliLiter(s) (100 mL/Hr) IV Continuous <Continuous>  dextrose 5%. 1000 milliLiter(s) (50 mL/Hr) IV Continuous <Continuous>  dextrose 50% Injectable 25 Gram(s) IV Push once  dextrose 50% Injectable 12.5 Gram(s) IV Push once  dextrose 50% Injectable 25 Gram(s) IV Push once  doxycycline IVPB      doxycycline IVPB 100 milliGRAM(s) IV Intermittent every 12 hours  finasteride 5 milliGRAM(s) Oral daily  furosemide   Injectable 40 milliGRAM(s) IV Push every 12 hours  gabapentin 100 milliGRAM(s) Oral two times a day  glucagon  Injectable 1 milliGRAM(s) IntraMuscular once  guaifenesin/dextromethorphan Oral Liquid 10 milliLiter(s) Oral every 6 hours  insulin glargine Injectable (LANTUS) 24 Unit(s) SubCutaneous at bedtime  insulin lispro (ADMELOG) corrective regimen sliding scale   SubCutaneous three times a day before meals  insulin lispro Injectable (ADMELOG) 7 Unit(s) SubCutaneous three times a day before meals  levothyroxine 25 MICROGram(s) Oral daily  methylPREDNISolone sodium succinate Injectable 20 milliGRAM(s) IV Push every 6 hours  metoprolol tartrate 25 milliGRAM(s) Oral two times a day  montelukast 10 milliGRAM(s) Oral at bedtime  multivitamin/minerals 1 Tablet(s) Oral daily  pantoprazole    Tablet 40 milliGRAM(s) Oral before breakfast  piperacillin/tazobactam IVPB.. 3.375 Gram(s) IV Intermittent every 8 hours  polyethylene glycol 3350 17 Gram(s) Oral daily  sacubitril 24 mG/valsartan 26 mG 1 Tablet(s) Oral two times a day  senna 2 Tablet(s) Oral at bedtime  tamsulosin 0.8 milliGRAM(s) Oral at bedtime    MEDICATIONS  (PRN):  acetaminophen     Tablet .. 650 milliGRAM(s) Oral every 6 hours PRN Moderate Pain (4 - 6)  dextrose Oral Gel 15 Gram(s) Oral once PRN Blood Glucose LESS THAN 70 milliGRAM(s)/deciliter      Allergies    No Known Allergies    Intolerances        VITALS:    Vital Signs Last 24 Hrs  T(C): 36.5 (16 Sep 2022 21:12), Max: 36.7 (16 Sep 2022 04:31)  T(F): 97.7 (16 Sep 2022 21:12), Max: 98 (16 Sep 2022 04:31)  HR: 91 (16 Sep 2022 21:12) (64 - 91)  BP: 124/65 (16 Sep 2022 21:12) (119/61 - 137/61)  BP(mean): --  RR: 18 (16 Sep 2022 21:12) (18 - 19)  SpO2: 90% (16 Sep 2022 21:12) (90% - 100%)    Parameters below as of 16 Sep 2022 21:12  Patient On (Oxygen Delivery Method): nasal cannula        LABS:                          8.5    7.73  )-----------( 293      ( 15 Sep 2022 07:22 )             30.1       09-15    140  |  102  |  21  ----------------------------<  190<H>  4.1   |  27  |  0.95    Ca    8.7      15 Sep 2022 07:22        CAPILLARY BLOOD GLUCOSE      POCT Blood Glucose.: 381 mg/dL (16 Sep 2022 21:53)  POCT Blood Glucose.: 298 mg/dL (16 Sep 2022 17:22)  POCT Blood Glucose.: 409 mg/dL (16 Sep 2022 12:37)  POCT Blood Glucose.: 427 mg/dL (16 Sep 2022 12:36)  POCT Blood Glucose.: 392 mg/dL (16 Sep 2022 08:43)      PT/INR - ( 16 Sep 2022 05:26 )   PT: 24.0 sec;   INR: 2.05 ratio             LOWER EXTREMITY PHYSICAL EXAM:    s/p RLE BKA amputation  left foot exam:  Vasular: DP 1/4, PT 0/4 CFT <3 seconds, Temperature gradient wnl.   Neuro: Epicritic sensation decreased to the level of foot  Musculoskeletal/Ortho: hammertoes 2-5   Skin: left foot heel lateral 5th MPJ wound full thickness secondary to pressure present on admission with no signs of infection

## 2022-09-17 NOTE — PHYSICAL THERAPY INITIAL EVALUATION ADULT - ADDITIONAL COMMENTS
Pt has been at [facility] for subacute rehab since May of 2022 (s/p R BKA in April of 2022). Pt is dependent for all functional mobility. + use of dependent mechanical lift for OOB activities. Pt only performs therex with PT, no transfer training or gait training attempted yet. Pt awaiting P&O consult for prosthesis.

## 2022-09-17 NOTE — PROGRESS NOTE ADULT - ASSESSMENT
TTE with Doppler (w/Cont) (04.03.22 @ 15:07) >  mild aortic stenosis. . Mild left ventricular systolic dysfunction. The inferior wall, and the inferoseptum are hypokinetic.  Echo 9/15/22: .EF 35-40% Severe segmental left ventricular systolic dysfunction. The mid to distal anteroseptum and severely hypokinetic. The mid to basal inferolateral wall is hypokinetic.      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 chf   -cta chest with no pe, Moderate left and small right pleural effusions, increased since May  2022, with worsening lower lobe compressive atelectasis. New right lung patchy and nodular areas of consolidation, likely  infectious/inflammatory  -Pulm fu, IV steroids, IV abx,  sp thoracentesis 9/15   -c/w IV diuretics   -echo with .EF 35-40% Severe segmental left ventricular systolic dysfunction. The mid to distal anteroseptum and severely hypokinetic. The mid to basal inferolateral wall is hypokinetic.    # acute on chronic systolic CHF   -c/w lasix 40 mg IVP BID   --1.2 liters  -repeat echo with moderate severe lv dysfxn, unchanged from echo  4/2022  -continue medical management of CMP/HF  -ecg, no ischemic changes, HS trop elevated secondary to hypoxia/ chf/ resp infection   -c/w bb / entresto     #Pafib, sp PPM   -Please call EP to interrogate PPM   -c/w BB /ac     #mild as   -echo stable         35 minutes spent on total encounter; more than 50% of the visit was spent counseling and/or coordinating care by the attending physician.

## 2022-09-17 NOTE — PHYSICAL THERAPY INITIAL EVALUATION ADULT - BALANCE TRAINING, PT EVAL
GOAL: pt will be able to independently sit at edge of bed to assist with ADL's within 12 weeks. pt will be able to independently transfer bed to chair with use of rolling walker via stand pivot within 12 weeks

## 2022-09-17 NOTE — CONSULT NOTE ADULT - ASSESSMENT
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, T2DM, CHF on lasix (recently decreased dose 2 days ago 2/2 hypernatremia), presents to the ED c/o increasing shortness of breath and "gurgling" sounds in chest, admitted for hypercapnic respiratory failure in the setting of aspiration pneumonia, COPD exacerbation, restrictive lung disease as well as acute on chronic systolic CHF. Endocrinology consulted for hyperglycemia management in the setting of T2DM exacerbated by steroid. High risk patient with high decision making.     1.  DM  - T2DM   - Most recent Hemoglobin A1C 9.1 may be accurate in the setting of CKD  - Current FS ranges from 300-400  - Current inpatient regimen: Glargine 24, Admelog 7   - Current diet:  CHO DASH  - Please monitor blood glucose values TID AC & QHS while eating regular meals and Q6H while NPO  - Blood glucose goals pre-meal less than 140 mg/dL and random blood glucose less than 180 mg/dL  - Recommendations:  - Glucose elevated due to baseline uncontrolled T2DM exacerbated by steroid  - Increase insulin Glargine to 26 units QHS  - Increase insulin Lispro to 13 units TID with meals, hold if NPO or if eating less than 50% of meals  - Change to moderate Correctional scale TID with meals  - Start low dose Correctional scale QHS    2. HTN  - BP goal 130/80  - Manage per primary team     3. HLD  - Continue with atorvastatin 80 mg daily   - Manage as outpatient     4. Hypothyroidism   - At home on Levothyroxine 25 mcg, continue      Discharge planning:  - Likely will need basal and bolus, will need to clarify home dose and verify that wife is giving him the insulin injection   - Spoke with the primary team, steroid will not be tapered over the plan, so his final insulin dosing will be determine once he is off the steroid   - Will need outpatient follow up with nephrology, opthalmology     Thank you for the consult. Will continue to monitor. Discussed the plan with the primary team NP. Contact via pager or [employer] Teams during business hours. For follow up questions, discharge recommendations, or new consults please call answering service at [phone] (weekdays), [phone] (nights/weekends). For nonurgent matters, please email [email or web address] or [email or web address].      Fanny Keenan MD  Department of Endocrinology, Diabetes and metabolism   Pager [phone]

## 2022-09-17 NOTE — PHYSICAL THERAPY INITIAL EVALUATION ADULT - PERTINENT HX OF CURRENT PROBLEM, REHAB EVAL
Pt is an 86yo M admitted from rehab 2/2 increasing SOB. CTA negative for PE. Pt being treated for acute on chronic CHF exacerbation, + PNA. Pt s/p thoracentesis on 9/15.

## 2022-09-17 NOTE — PROGRESS NOTE ADULT - ASSESSMENT
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   PMH earlier this year: The patient was admitted in April after many months of outpatient therapy for chronic right foot wounds and leg pain. 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   Post-operative course was notable for poor wound healing (was considered for an AKA), hypoxic respiratory failure due to a COPD exacerbation, restrictive lung disease and aspiration pneumonia. He required ICU admission for an insulin infusion for steroid induced hyperglycemia, heparin gtt for PAD and encephalopathy related to narcotic analgesics for post-operative pain.   The patient was found to have dysphagia but the family decided against non-oral feeding and opted for "pleasure feeds".   The patient was discharged on May 3rd.   He has been at rehab since that time. He recently was fitted for a prosthesis in hopes of starting ambulation.   He has a wound on his left foot.   His LAsix was lowered 2/2 hypernatremia few days PTA  He is sent to the ER with shortness of breath, hypoxemia and "gurgling" in his chest.   The patient has a cough with difficulty expectorating sputum. He has chest congestion and wheeze. No fevers, chills or sweats. No chest pain/pressure or palpitations. Chest radiograph is abnormal.    CT -> patent central airways - centrilobular emphysema - moderate left and small right pleural effusions with associated partial right and complete left lower lobe compressive atelectasis - patchy and nodular areas of consolidation throughout the aerated portions of the right lung dependently - other nodular areas of consolidation in the posterior left upper lobe persist although have improved    multifactorial hypoxemia and chronic hypercapnic respiratory failure  1) COPD/emphysema with exacerbation  2) bilateral left > right pleural effusions with associated atelectasis due to ischemic and valvular heart disease  3) 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 and respiratory muscle weakness  4) Aspiration PNA  5) acute on chronic systolic CHF    ptn was seen by cardiology and pulmonary    on  3lpm nasal canula  right thoracenteses done today by pulm: 1 liter removed w resolution of dyspnea  as per pulm: the left effusion seems to small to "tap" safely  zosyn/doxycycline - all cx NTD  solumedrol 20mg IVP q6h  albuterol/atrovent nebs q6h  pulmicort 0.5mg nebs q12h - give after duoneb - rinse mouth after use  robitussin DM 300mg 4 times daily  singulair 10mg @ bedtime  acapella device/incentive spirometer  cardiac meds: lipitor/losartan/metoprolol/IV lasix 40 mg q12H  strict is and os  daily weights  TTE is stable  EP to interrogate PPM  Afib on Coumadin  endo consult for regulating blood glucose  proscar/flomax - hudson catheter is in place  GI/DVT prophylaxis  bowel regimen

## 2022-09-17 NOTE — CONSULT NOTE ADULT - SUBJECTIVE AND OBJECTIVE BOX
HPI: Patient is a 86 yo male pmhx BPH, PAD s/p R BKA, COPD (not on home O2), HTN, HLD, afib, CVA w/ residual L hemiparesis on coumadin, T2DM, CHF on lasix (recently decreased dose 2 days ago 2/2 hypernatremia), presents to the ED c/o increasing shortness of breath and "gurgling" sounds in chest, admitted for hypercapnic respiratory failure in the setting of aspiration pneumonia, COPD exacerbation, restrictive lung disease as well as acute on chronic systolic CHF. Endocrinology consulted for hyperglycemia management in the setting of T2DM exacerbated by steroid.     Patient is currently on Methylprednisolone 20 mg Q6H.     Diabetes history:  Patient is not the best historian, history is obtained both from him and the chart.   Patient said he was diagnosed with diabetes for many years, has been on insulin for at least 10+ years. At home wife gives him the insulin injections. Does check finger stick at home, usually in the 200s, never 300s. Denies hypoglycemia.   No family history of diabetes.   Denies smoking, alcohol use. Lives at home with wife and daughter.     Most recent A1C 9.1    Home DM medications:  According to outpatient records, patient said he doesn't know his dose at home   - Levemir 30 units QHS  - Lispro 3 units TID with meals   Also on crestor 10 mg   Levothyroxine 25 mcg      Current inpatient DM Meds:   Glargine 24 units at bed time   - Admelog 7 units TID with meals   - LDCC     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 650 milliGRAM(s) Oral every 6 hours PRN  albuterol/ipratropium for Nebulization 3 milliLiter(s) Nebulizer every 6 hours  aspirin enteric coated 81 milliGRAM(s) Oral daily  atorvastatin 80 milliGRAM(s) Oral at bedtime  buDESOnide    Inhalation Suspension 0.5 milliGRAM(s) Inhalation two times a day  cadexomer iodine 0.9% Gel 1 Application(s) Topical daily  dextrose 5%. 1000 milliLiter(s) IV Continuous <Continuous>  dextrose 5%. 1000 milliLiter(s) IV Continuous <Continuous>  dextrose 50% Injectable 25 Gram(s) IV Push once  dextrose 50% Injectable 12.5 Gram(s) IV Push once  dextrose 50% Injectable 25 Gram(s) IV Push once  dextrose Oral Gel 15 Gram(s) Oral once PRN  doxycycline IVPB      doxycycline IVPB 100 milliGRAM(s) IV Intermittent every 12 hours  finasteride 5 milliGRAM(s) Oral daily  furosemide   Injectable 40 milliGRAM(s) IV Push every 12 hours  gabapentin 100 milliGRAM(s) Oral two times a day  glucagon  Injectable 1 milliGRAM(s) IntraMuscular once  guaifenesin/dextromethorphan Oral Liquid 10 milliLiter(s) Oral every 6 hours  insulin glargine Injectable (LANTUS) 24 Unit(s) SubCutaneous at bedtime  insulin lispro (ADMELOG) corrective regimen sliding scale   SubCutaneous three times a day before meals  insulin lispro Injectable (ADMELOG) 7 Unit(s) SubCutaneous three times a day before meals  levothyroxine 25 MICROGram(s) Oral daily  methylPREDNISolone sodium succinate Injectable 20 milliGRAM(s) IV Push every 6 hours  metoprolol tartrate 25 milliGRAM(s) Oral two times a day  montelukast 10 milliGRAM(s) Oral at bedtime  multivitamin/minerals 1 Tablet(s) Oral daily  pantoprazole    Tablet 40 milliGRAM(s) Oral before breakfast  piperacillin/tazobactam IVPB.. 3.375 Gram(s) IV Intermittent every 8 hours  polyethylene glycol 3350 17 Gram(s) Oral daily  sacubitril 24 mG/valsartan 26 mG 1 Tablet(s) Oral two times a day  senna 2 Tablet(s) Oral at bedtime  tamsulosin 0.8 milliGRAM(s) Oral at bedtime  warfarin 7.5 milliGRAM(s) Oral once      Allergies:  No Known Allergies      ROS:     General: Denies weight loss/weight gain, endorses poor appetite and fatigue   Eyes: Denies Blurry vision, double vision, visual changes  ENT: Denies Throat pain, changes in voice,   CV: Denies palpitations, SOB, CP, cough  GI: Denies NVD, difficulty swallowing, abdominal pain  : Denies polyuria, dysuria  MSK: Denies weakness, joint pain  Skin: Denies rash, dryness, diaphoresis  Heme: Denies Easy bruising or bleeding  Neuro: Denies HA, dizziness, lightheadedness, numbness tingling  Psych: Denies Anxiety, Depression    Vital Signs Last 24 Hrs  T(C): 36.3 (17 Sep 2022 12:00), Max: 36.5 (16 Sep 2022 21:12)  T(F): 97.4 (17 Sep 2022 12:00), Max: 97.7 (16 Sep 2022 21:12)  HR: 68 (17 Sep 2022 12:00) (64 - 91)  BP: 145/69 (17 Sep 2022 12:00) (124/65 - 145/69)  BP(mean): --  RR: 18 (17 Sep 2022 12:00) (18 - 18)  SpO2: 96% (17 Sep 2022 12:00) (90% - 100%)    Parameters below as of 17 Sep 2022 12:00  Patient On (Oxygen Delivery Method): nasal cannula  O2 Flow (L/min): 2    Height (cm): 172.7 (09-14 @ 11:55)  Weight (kg): 86.2 (09-14 @ 11:55)  BMI (kg/m2): 28.9 (09-14 @ 11:55)      Constitutional: wn/wd in NAD.   HEENT: clear, EOMI, , no proptosis or lid retraction  Neck: no thyromegaly or palpable thyroid nodules   Respiratory: lungs CTAB.  Cardiovascular: regular rhythm, normal S1 and S2,    GI: soft, NT/ND  Neurology: no tremors  Skin: no visible rashes  Psychiatric: AAO x 3, normal affect/mood.  Ext: 2+ pitting edema in the LLE with left heel ulcer       LABS:    09-17    138  |  100  |  49<H>  ----------------------------<  400<H>  3.5   |  29  |  1.76<H>    Ca    8.3<L>      17 Sep 2022 07:18  Mg     1.9     09-17      PT/INR - ( 17 Sep 2022 07:23 )   PT: 18.8 sec;   INR: 1.63 ratio               Thyroid Stimulating Hormone, Serum: 1.71 (09-16 @ 05:26)      RADIOLOGY & ADDITIONAL STUDIES:  CAPILLARY BLOOD GLUCOSE      POCT Blood Glucose.: 462 mg/dL (17 Sep 2022 12:57)  POCT Blood Glucose.: 387 mg/dL (17 Sep 2022 08:41)  POCT Blood Glucose.: 381 mg/dL (16 Sep 2022 21:53)  POCT Blood Glucose.: 298 mg/dL (16 Sep 2022 17:22)

## 2022-09-18 NOTE — PROGRESS NOTE ADULT - ASSESSMENT
TTE with Doppler (w/Cont) (04.03.22 @ 15:07) >  mild aortic stenosis. . Mild left ventricular systolic dysfunction. The inferior wall, and the inferoseptum are hypokinetic.  Echo 9/15/22: .EF 35-40% Severe segmental left ventricular systolic dysfunction. The mid to distal anteroseptum and severely hypokinetic. The mid to basal inferolateral wall is hypokinetic.      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 chf   -cta chest with no pe, Moderate left and small right pleural effusions, increased since May  2022, with worsening lower lobe compressive atelectasis. New right lung patchy and nodular areas of consolidation, likely  infectious/inflammatory  -Pulm fu, IV steroids, IV abx,  sp thoracentesis 9/15   -c/w IV diuretics   -echo with .EF 35-40% Severe segmental left ventricular systolic dysfunction. The mid to distal anteroseptum and severely hypokinetic. The mid to basal inferolateral wall is hypokinetic.    # acute on chronic systolic CHF   -c/w lasix 40 mg IVP BID   --1.2 liters  -repeat echo with moderate severe lv dysfxn, unchanged from echo  4/2022  -continue medical management of CMP/HF  -ecg, no ischemic changes, HS trop elevated secondary to hypoxia/ chf/ resp infection   -c/w bb / entresto     #Pafib, sp PPM   -Please call EP to interrogate PPM   -c/w BB /ac   -WCT noted increase metoprolol to 50mg PO BID    #mild as   -echo stable         35 minutes spent on total encounter; more than 50% of the visit was spent counseling and/or coordinating care by the attending physician.

## 2022-09-18 NOTE — PROGRESS NOTE ADULT - ASSESSMENT
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   PMH earlier this year: The patient was admitted in April after many months of outpatient therapy for chronic right foot wounds and leg pain. 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   Post-operative course was notable for poor wound healing (was considered for an AKA), hypoxic respiratory failure due to a COPD exacerbation, restrictive lung disease and aspiration pneumonia. He required ICU admission for an insulin infusion for steroid induced hyperglycemia, heparin gtt for PAD and encephalopathy related to narcotic analgesics for post-operative pain.   The patient was found to have dysphagia but the family decided against non-oral feeding and opted for "pleasure feeds".   The patient was discharged on May 3rd.   He has been at rehab since that time. He recently was fitted for a prosthesis in hopes of starting ambulation.   He has a wound on his left foot.   His LAsix was lowered 2/2 hypernatremia few days PTA  He is sent to the ER with shortness of breath, hypoxemia and "gurgling" in his chest.   The patient has a cough with difficulty expectorating sputum. He has chest congestion and wheeze. No fevers, chills or sweats. No chest pain/pressure or palpitations. Chest radiograph is abnormal.    CT -> patent central airways - centrilobular emphysema - moderate left and small right pleural effusions with associated partial right and complete left lower lobe compressive atelectasis - patchy and nodular areas of consolidation throughout the aerated portions of the right lung dependently - other nodular areas of consolidation in the posterior left upper lobe persist although have improved    multifactorial hypoxemia and chronic hypercapnic respiratory failure  1) COPD/emphysema with exacerbation  2) bilateral left > right pleural effusions with associated atelectasis due to ischemic and valvular heart disease  3) 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 and respiratory muscle weakness  4) Aspiration PNA  5) acute on chronic systolic CHF    ptn was seen by cardiology and pulmonary    on  3lpm nasal canula  right thoracenteses done today by pulm: 1 liter removed w resolution of dyspnea  as per pulm: the left effusion seems to small to "tap" safely  zosyn/doxycycline - all cx NTD  solumedrol 20mg IVP q6h  albuterol/atrovent nebs q6h  pulmicort 0.5mg nebs q12h - give after duoneb - rinse mouth after use  robitussin DM 300mg 4 times daily  singulair 10mg @ bedtime  acapella device/incentive spirometer  cardiac meds: lipitor/losartan/metoprolol/IV lasix 40 mg q12H  strict is and os  daily weights  TTE is stable  EP to interrogate PPM  Afib on Coumadin  endo consult for regulating blood glucose, BGM remain elevated, endo following  proscar/flomax - hduson catheter is in place  GI/DVT prophylaxis  bowel regimen

## 2022-09-18 NOTE — PROGRESS NOTE ADULT - ASSESSMENT
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, T2DM, CHF on lasix (recently decreased dose 2 days ago 2/2 hypernatremia), presents to the ED c/o increasing shortness of breath and "gurgling" sounds in chest, admitted for hypercapnic respiratory failure in the setting of aspiration pneumonia, COPD exacerbation, restrictive lung disease as well as acute on chronic systolic CHF. Endocrinology consulted for hyperglycemia management in the setting of T2DM exacerbated by steroid. High risk patient with high decision making.     1.  DM  - T2DM   - Most recent Hemoglobin A1C 9.1 may be accurate in the setting of CKD  - Current FS ranges from 200-400  - Current inpatient regimen: Glargine 24, Admelog 13   - Current diet:  CHO DASH  - Please monitor blood glucose values TID AC & QHS while eating regular meals and Q6H while NPO  - Blood glucose goals pre-meal less than 140 mg/dL and random blood glucose less than 180 mg/dL  - Recommendations:  - Glucose elevated due to baseline uncontrolled T2DM exacerbated by steroid  - Continue with insulin Glargine 26 units QHS  - Increase insulin Lispro to 20 units TID with meals, hold if NPO or if eating less than 50% of meals  - Continue with moderate Correctional scale TID with meals  - Continue with low dose Correctional scale QHS    2. HTN  - BP goal 130/80  - Manage per primary team     3. HLD  - Continue with atorvastatin 80 mg daily   - Manage as outpatient     4. Hypothyroidism   - At home on Levothyroxine 25 mcg, continue      Discharge planning:  - Likely will need basal and bolus, will need to clarify home dose and verify that wife is giving him the insulin injection   - Spoke with the primary team, steroid will not be tapered over the plan, so his final insulin dosing will be determined once he is off the steroid   - Will need outpatient follow up with nephrology, opthalmology     Thank you for the consult. Will continue to monitor.  Contact via pager or [employer] Teams during business hours. For follow up questions, discharge recommendations, or new consults please call answering service at [phone] (weekdays), [phone] (nights/weekends). For nonurgent matters, please email [email or web address] or [email or web address].      Fanny Keenan MD  Department of Endocrinology, Diabetes and metabolism   Pager [phone]

## 2022-09-19 NOTE — PROGRESS NOTE ADULT - ASSESSMENT
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   PMH earlier this year: The patient was admitted in April after many months of outpatient therapy for chronic right foot wounds and leg pain. 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   Post-operative course was notable for poor wound healing (was considered for an AKA), hypoxic respiratory failure due to a COPD exacerbation, restrictive lung disease and aspiration pneumonia. He required ICU admission for an insulin infusion for steroid induced hyperglycemia, heparin gtt for PAD and encephalopathy related to narcotic analgesics for post-operative pain.   The patient was found to have dysphagia but the family decided against non-oral feeding and opted for "pleasure feeds".   The patient was discharged on May 3rd.   He has been at rehab since that time. He recently was fitted for a prosthesis in hopes of starting ambulation.   He has a wound on his left foot.   His LAsix was lowered 2/2 hypernatremia few days PTA  He is sent to the ER with shortness of breath, hypoxemia and "gurgling" in his chest.   The patient has a cough with difficulty expectorating sputum. He has chest congestion and wheeze. No fevers, chills or sweats. No chest pain/pressure or palpitations. Chest radiograph is abnormal.    CT -> patent central airways - centrilobular emphysema - moderate left and small right pleural effusions with associated partial right and complete left lower lobe compressive atelectasis - patchy and nodular areas of consolidation throughout the aerated portions of the right lung dependently - other nodular areas of consolidation in the posterior left upper lobe persist although have improved    multifactorial hypoxemia and chronic hypercapnic respiratory failure  1) COPD/emphysema with exacerbation  2) bilateral left > right pleural effusions with associated atelectasis due to ischemic and valvular heart disease  3) 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 and respiratory muscle weakness  4) Aspiration PNA  5) acute on chronic systolic CHF    ptn was seen by cardiology and pulmonary    on  3lpm nasal canula  right thoracenteses done today by pulm: 1 liter removed w resolution of dyspnea  as per pulm: the left effusion seems to small to "tap" safely  zosyn/doxycycline - all cx NTD  solumedrol 20mg IVP q6h  albuterol/atrovent nebs q6h  pulmicort 0.5mg nebs q12h - give after duoneb - rinse mouth after use  robitussin DM 300mg 4 times daily  singulair 10mg @ bedtime  acapella device/incentive spirometer  cardiac meds: lipitor/losartan/metoprolol/IV lasix 40 mg q12H  strict is and os  daily weights  TTE is stable  EP to interrogate PPM  Afib on Coumadin  endo consult for regulating blood glucose, BGM remain elevated, endo following  proscar/flomax - hudson catheter is in place  GI/DVT prophylaxis  bowel regimen

## 2022-09-19 NOTE — PROGRESS NOTE ADULT - ASSESSMENT
ASSESSMENT:     87 year old gentleman, former smoker, followed by Dr. Alicja Rob of our practice for asthma/COPD overlap syndrome and obstructive sleep apnea being treated conservatively. He has no history of TOM colonization/infection as listed in the "past medical history". 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 The patient was admitted in April after many months of outpatient therapy for chronic right foot wounds and leg pain. 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 Post-operative course was notable for poor wound healing (was considered for an AKA), hypoxic respiratory failure due to a COPD exacerbation, restrictive lung disease and aspiration pneumonia. He required ICU admission for an insulin infusion for steroid induced hyperglycemia, heparin gtt for PAD and encephalopathy related to narcotic analgesics for post-operative pain. The patient was found to have dysphagia but the family decided against non-oral feeding and for "pleasure feeds". The patient was discharged on May 3rd. He has been at rehab since that time. He recently was fitted for a prosthesis in hopes of starting ambulation. He has a wound on his left foot. He is sent to the ER shortly after decreasing his lasix dose due to hypernatremia with shortness of breath, hypoxemia and "gurgling" in his chest. The patient has a cough with difficulty expectorating sputum. He has chest congestion and wheeze. No fevers, chills or sweats. No chest pain/pressure or palpitations. Chest radiograph is abnormal.    CT -> patent central airways - centrilobular emphysema - moderate left and small right pleural effusions with associated partial right and complete left lower lobe compressive atelectasis - patchy and nodular areas of consolidation throughout the aerated portions of the right lung dependently - other nodular areas of consolidation in the posterior left upper lobe persist although have improved    multifactorial hypoxemia and chronic hypercapnic respiratory failure  1) COPD/emphysema with exacerbation  2) bilateral left > right pleural effusions with associated atelectasis due to ischemic and valvular heart disease  3) 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 and respiratory muscle weakness  4) pneumonia perhaps related to aspiration  5) no evidence of pulmonary embolism    PLAN/RECOMMENDATIONS:    oxygen supplementation to keep saturation greater than 92% - currently on a 2lpm nasal canula  s/p a large volume left sided thoracenteses 9/15 -> 1000cc - transudative appearing fluid - the right effusion was too small to "tap" safely  follow-up chest CT  complete a 5 - 7 day course of zosyn/doxycycline   continue solumedrol 20mg IVP q6h  continue albuterol/atrovent nebs q6h  continue continue pulmicort 0.5mg nebs q12h - give after duoneb - rinse mouth after use  robitussin DM 300mg 4 times daily  continue singulair 10mg @ bedtime  acapella device/incentive spirometer  cardiology follow-up noted    ECHO -> severe segmental left ventricular systolic dysfunction - the mid to distal anteroseptum is severely hypokinetic - the mid to basal inferolateral wall is hypokinetic - icreased E/e'  is consistent with elevated left ventricular filling pressure    cardiac meds: ASA/lipitor/entresto/metoprolol/lasix  proscar/flomax - hudson catheter is in place  GI/DVT prophylaxis  bowel regimen  out of bed and into the chair    Will follow with you. Plan of care discussed with the patient at bedside and with Dr. Jasson Wright [facility], [facility]  [phone]  Pulmonary Medicine

## 2022-09-19 NOTE — PROGRESS NOTE ADULT - ASSESSMENT
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 Endocrinology consulted for hyperglycemia management in the setting of T2DM exacerbated by steroid.      1.  DM  - T2DM   - Most recent Hemoglobin A1C 9.1 may be accurate in the setting of CKD  - Current FS ranges from 200-250, improving   - Current inpatient regimen: Glargine 26, Admelog 20  - Current diet:  CHO DASH  - Please monitor blood glucose values TID AC & QHS while eating regular meals and Q6H while NPO  - Blood glucose goals pre-meal less than 140 mg/dL and random blood glucose less than 180 mg/dL  - Recommendations:  - Glucose elevated due to baseline uncontrolled T2DM exacerbated by steroid  - Glucose now improving, steroid dose was kept the same since 9/17  - Continue with insulin Glargine 26 units QHS  - Increase insulin Lispro to 24 units TID with meals, hold if NPO or if eating less than 50% of meals  - Continue with moderate Correctional scale TID with meals  - Continue with low dose Correctional scale QHS    2. HTN  - BP goal 130/80  - Manage per primary team     3. HLD  - Continue with atorvastatin 80 mg daily   - Manage as outpatient     4. Hypothyroidism   - At home on Levothyroxine 25 mcg, continue      Discharge planning:  - Home DM medications: - Levemir 30 units QHS and - Lispro 3 units TID with meals given by patient's wife   - Likely will be discharged on basal and bolus, will need to  verify that wife is giving him the insulin injection   - Will need outpatient follow up with nephrology, opthalmology     Thank you for the consult. Will continue to monitor.  Contact via pager or [employer] Teams during business hours. For follow up questions, discharge recommendations, or new consults please call answering service at [phone] (weekdays), [phone] (nights/weekends). For nonurgent matters, please email [email or web address] or [email or web address].      Fanny Keenan MD  Department of Endocrinology, Diabetes and metabolism   Pager [phone]

## 2022-09-20 NOTE — PROGRESS NOTE ADULT - ASSESSMENT
TTE with Doppler (w/Cont) (04.03.22 @ 15:07) >  mild aortic stenosis. . Mild left ventricular systolic dysfunction. The inferior wall, and the inferoseptum are hypokinetic.  Echo 9/15/22: .EF 35-40% Severe segmental left ventricular systolic dysfunction. The mid to distal anteroseptum and severely hypokinetic. The mid to basal inferolateral wall is hypokinetic.      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 chf   -cta chest with no pe, Moderate left and small right pleural effusions, increased since May  2022, with worsening lower lobe compressive atelectasis. New right lung patchy and nodular areas of consolidation, likely  infectious/inflammatory  -Pulm fu, IV steroids, IV abx,  sp thoracentesis 9/15   -change to PO diuretics  -echo with .EF 35-40% Severe segmental left ventricular systolic dysfunction. The mid to distal anteroseptum and severely hypokinetic. The mid to basal inferolateral wall is hypokinetic.    # acute on chronic systolic CHF   -transition to 40mg PO BID  -repeat echo with moderate severe lv dysfxn, unchanged from echo  4/2022  -continue medical management of CMP/HF  -ecg, no ischemic changes, HS trop elevated secondary to hypoxia/ chf/ resp infection   -c/w bb / entresto     #Pafib, sp PPM   -Please call EP to interrogate PPM   -c/w BB /ac       #mild as   -echo stable         35 minutes spent on total encounter; more than 50% of the visit was spent counseling and/or coordinating care by the attending physician.

## 2022-09-20 NOTE — CONSULT NOTE ADULT - REASON FOR ADMISSION
acute rayshawn chronic diastolic CHF exacerbation, Pneumonia

## 2022-09-20 NOTE — CONSULT NOTE ADULT - ASSESSMENT
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, T2DM, CHF on lasix (recently decreased dose 2 days ago 2/2 hypernatremia), presents to the ED c/o increasing shortness of breath and "gurgling" sounds in chest, admitted for hypercapnic respiratory failure in the setting of aspiration pneumonia, COPD exacerbation, restrictive lung disease as well as acute on chronic systolic CHF now with KARLOS      1) KARLOS  b/l cr previously was around 1.7mg/dl however from recently hospitalization pts new baseline around 1mg/dl likely 2/2 muscle loss  Cr now is 1.48mg/dl which maybe new baseline vs karlos 2/2 hemodynamic effects vs cardiorenal  cont with hudson from previous retention  currently pt is on entresto which we can continue but need to watch carefully  c/w lasix 40mg iv q12h   check ua  check urine na/cr/cl/osm/k  c/w hudson  avoid iv contrast   trend bmp    2) Hypokalemia- likely 2/2 to lasix  s/p kcl 40meq po x 1  trend k    Dr Knight  [phone]

## 2022-09-20 NOTE — PROGRESS NOTE ADULT - ASSESSMENT
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 Endocrinology consulted for hyperglycemia management in the setting of T2DM exacerbated by steroid.      1.  DM  - T2DM   - Most recent Hemoglobin A1C 9.1 may be accurate in the setting of CKD  - Current FS ranges from  improving  - Current inpatient regimen: Glargine 26, Admelog 24  - Current diet:  CHO DASH  - Please monitor blood glucose values TID AC & QHS while eating regular meals and Q6H while NPO  - Blood glucose goals pre-meal less than 140 mg/dL and random blood glucose less than 180 mg/dL  - Recommendations:  - Glucose elevated due to baseline uncontrolled T2DM exacerbated by steroid  - Glucose now improving, steroid dose now tapered to prednisone 50 mg daily for 5 days (9/20-9/24)  - Continue with insulin Glargine 26 units QHS  - Decrease insulin Lispro to 20 units TID with meals, hold if NPO or if eating less than 50% of meals  - Continue with moderate Correctional scale TID with meals  - Continue with low dose Correctional scale QHS    2. HTN  - BP goal 130/80  - Manage per primary team     3. HLD  - Continue with atorvastatin 80 mg daily   - Manage as outpatient     4. Hypothyroidism   - At home on Levothyroxine 25 mcg, continue      Discharge planning:  - Home DM medications: - Levemir 30 units QHS and - Lispro 3 units TID with meals given by patient's wife   - Likely will be discharged on basal and bolus   - Final insulin dose depending on insulin requirements after steroid taper   - Will need outpatient follow up with nephrology, opthalmology   - Can follow up with primary care for glucose management     Thank you for the consult. Will continue to monitor.  Contact via pager or [employer] Teams during business hours. For follow up questions, discharge recommendations, or new consults please call answering service at [phone] (weekdays), [phone] (nights/weekends). For nonurgent matters, please email [email or web address] or [email or web address].      Fanny Keenan MD  Department of Endocrinology, Diabetes and metabolism   Pager [phone]

## 2022-09-20 NOTE — CHART NOTE - NSCHARTNOTEFT_GEN_A_CORE
consulted to see pt w/ foot wound,  pt seen by dpm.  d/w team who is agreeable to defer to dpm.  remain available as requested.

## 2022-09-20 NOTE — CONSULT NOTE ADULT - SUBJECTIVE AND OBJECTIVE BOX
[facility] Consult Note Nephrology - CONSULTATION NOTE    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, T2DM, CHF on lasix (recently decreased dose 2 days ago 2/2 hypernatremia), presents to the ED c/o increasing shortness of breath and "gurgling" sounds in chest, admitted for hypercapnic respiratory failure in the setting of aspiration pneumonia, COPD exacerbation, restrictive lung disease as well as acute on chronic systolic CHF. Endocrinology consulted for hyperglycemia management in the setting of T2DM exacerbated by steroid.     Patient is currently on Methylprednisolone 20 mg Q6H.     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      Deep Vein Thrombosis (DVT)  17 yeras ago on Coumadin      CHF (congestive heart failure)  last exacerbation in 1/2017      Enlarged prostate      GERD (gastroesophageal reflux disease)      Hernia  umblical      Calculus of bile duct without cholangitis or cholecystitis without obstruction      Atrial fibrillation      S/P Hernia Repair      S/P cataract surgery, unspecified laterality      S/P ERCP  3/2017        No Known Allergies    Home Medications Reviewed  Hospital Medications:   MEDICATIONS  (STANDING):  albuterol/ipratropium for Nebulization 3 milliLiter(s) Nebulizer every 6 hours  aspirin enteric coated 81 milliGRAM(s) Oral daily  atorvastatin 80 milliGRAM(s) Oral at bedtime  buDESOnide    Inhalation Suspension 0.5 milliGRAM(s) Inhalation two times a day  cadexomer iodine 0.9% Gel 1 Application(s) Topical daily  dextrose 5%. 1000 milliLiter(s) (100 mL/Hr) IV Continuous <Continuous>  dextrose 5%. 1000 milliLiter(s) (50 mL/Hr) IV Continuous <Continuous>  dextrose 50% Injectable 25 Gram(s) IV Push once  dextrose 50% Injectable 12.5 Gram(s) IV Push once  dextrose 50% Injectable 25 Gram(s) IV Push once  doxycycline IVPB 100 milliGRAM(s) IV Intermittent every 12 hours  doxycycline IVPB      finasteride 5 milliGRAM(s) Oral daily  furosemide   Injectable 40 milliGRAM(s) IV Push every 12 hours  gabapentin 100 milliGRAM(s) Oral two times a day  glucagon  Injectable 1 milliGRAM(s) IntraMuscular once  guaifenesin/dextromethorphan Oral Liquid 10 milliLiter(s) Oral every 6 hours  insulin glargine Injectable (LANTUS) 26 Unit(s) SubCutaneous at bedtime  insulin lispro (ADMELOG) corrective regimen sliding scale   SubCutaneous three times a day before meals  insulin lispro (ADMELOG) corrective regimen sliding scale   SubCutaneous at bedtime  insulin lispro Injectable (ADMELOG) 20 Unit(s) SubCutaneous three times a day before meals  levothyroxine 25 MICROGram(s) Oral daily  metoprolol tartrate 50 milliGRAM(s) Oral two times a day  montelukast 10 milliGRAM(s) Oral at bedtime  multivitamin/minerals 1 Tablet(s) Oral daily  pantoprazole    Tablet 40 milliGRAM(s) Oral before breakfast  piperacillin/tazobactam IVPB.. 3.375 Gram(s) IV Intermittent every 8 hours  polyethylene glycol 3350 17 Gram(s) Oral daily  potassium chloride    Tablet ER 40 milliEquivalent(s) Oral once  predniSONE   Tablet 50 milliGRAM(s) Oral daily  sacubitril 24 mG/valsartan 26 mG 1 Tablet(s) Oral two times a day  senna 2 Tablet(s) Oral at bedtime  tamsulosin 0.8 milliGRAM(s) Oral at bedtime    SOCIAL HISTORY:  Denies ETOh,Smoking,   FAMILY HISTORY:    REVIEW OF SYSTEMS:  CONSTITUTIONAL: No weakness, fevers or chills  EYES/ENT: No visual changes;  No vertigo or throat pain   NECK: No pain or stiffness  RESPIRATORY: + shortness of breath  CARDIOVASCULAR: No chest pain or palpitations.  GASTROINTESTINAL: No abdominal or epigastric pain. No nausea, vomiting, or hematemesis; No diarrhea or constipation. No melena or hematochezia.  GENITOURINARY: No dysuria, frequency, foamy urine, urinary urgency, incontinence or hematuria  NEUROLOGICAL: No numbness or weakness  SKIN: No itching, burning, rashes, or lesions   VASCULAR: ++ bilateral lower extremity edema.   All other review of systems is negative unless indicated above.    VITALS:  T(F): 97.6 (09-20-22 @ 11:45), Max: 97.6 (09-19-22 @ 20:40)  HR: 58 (09-20-22 @ 13:32)  BP: 106/53 (09-20-22 @ 13:32)  RR: 18 (09-20-22 @ 11:45)  SpO2: 96% (09-20-22 @ 13:32)  Wt(kg): --    09-19 @ 07:01  -  09-20 @ 07:00  --------------------------------------------------------  IN: 0 mL / OUT: 1800 mL / NET: -1800 mL    09-20 @ 07:01  -  09-20 @ 15:01  --------------------------------------------------------  IN: 0 mL / OUT: 800 mL / NET: -800 mL        PHYSICAL EXAM:  Constitutional: NAD  HEENT: anicteric sclera, oropharynx clear, MMM  Neck: No JVD  Respiratory:b/l rhonchi  Cardiovascular: S1, S2, RRR  Gastrointestinal: BS+, soft, NT/ND  Extremities: + peripheral edema  Neurological: A/O x 2,  Psychiatric: flat affect  : +indwelling hudson.       LABS:  09-20    145  |  105  |  63<H>  ----------------------------<  41<LL>  3.4<L>   |  27  |  1.48<H>    Ca    8.5      20 Sep 2022 07:28  Mg     1.8     09-19      Creatinine Trend: 1.48 <--, 1.44 <--, 1.56 <--, 1.76 <--, 0.95 <--, 0.89 <--    Urine Studies:      RADIOLOGY & ADDITIONAL STUDIES:

## 2022-09-20 NOTE — PROGRESS NOTE ADULT - ASSESSMENT
ASSESSMENT:     87 year old gentleman, former smoker, followed by Dr. Alicja Rob of our practice for asthma/COPD overlap syndrome and obstructive sleep apnea being treated conservatively. He has no history of TOM colonization/infection as listed in the "past medical history". 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 The patient was admitted in April after many months of outpatient therapy for chronic right foot wounds and leg pain. 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 Post-operative course was notable for poor wound healing (was considered for an AKA), hypoxic respiratory failure due to a COPD exacerbation, restrictive lung disease and aspiration pneumonia. He required ICU admission for an insulin infusion for steroid induced hyperglycemia, heparin gtt for PAD and encephalopathy related to narcotic analgesics for post-operative pain. The patient was found to have dysphagia but the family decided against non-oral feeding and for "pleasure feeds". The patient was discharged on May 3rd. He has been at rehab since that time. He recently was fitted for a prosthesis in hopes of starting ambulation. He has a wound on his left foot. He is sent to the ER shortly after decreasing his lasix dose due to hypernatremia with shortness of breath, hypoxemia and "gurgling" in his chest. The patient has a cough with difficulty expectorating sputum. He has chest congestion and wheeze. No fevers, chills or sweats. No chest pain/pressure or palpitations. Chest radiograph is abnormal.    CT -> patent central airways - centrilobular emphysema - moderate left and small right pleural effusions with associated partial right and complete left lower lobe compressive atelectasis - patchy and nodular areas of consolidation throughout the aerated portions of the right lung dependently - other nodular areas of consolidation in the posterior left upper lobe persist although have improved    multifactorial hypoxemia and chronic hypercapnic respiratory failure  1) COPD/emphysema with exacerbation  2) bilateral left > right pleural effusions with associated atelectasis due to ischemic and valvular heart disease  3) 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 and respiratory muscle weakness  4) pneumonia perhaps related to aspiration  5) no evidence of pulmonary embolism    PLAN/RECOMMENDATIONS:    oxygen supplementation to keep saturation greater than 92% - currently on a 3lpm nasal canula  s/p a large volume left sided thoracenteses 9/15 -> 1000cc - transudative appearing fluid - the right effusion was too small to "tap" safely  follow-up chest CT  complete a 5 - 7 day course of zosyn/doxycycline   transition solumedrol -> prednisone 50mg daily x 5 days  continue albuterol/atrovent nebs q6h  continue continue pulmicort 0.5mg nebs q12h - give after duoneb - rinse mouth after use  robitussin DM 300mg 4 times daily  continue singulair 10mg @ bedtime  acapella device/incentive spirometer  cardiology follow-up noted    ECHO -> severe segmental left ventricular systolic dysfunction - the mid to distal anteroseptum is severely hypokinetic - the mid to basal inferolateral wall is hypokinetic - icreased E/e'  is consistent with elevated left ventricular filling pressure    cardiac meds: ASA/lipitor/entresto/metoprolol/lasix  proscar/flomax - hudson catheter is in place  GI/DVT prophylaxis  bowel regimen  out of bed and into the chair    Will follow with you. Plan of care discussed with the patient at bedside and with Dr. Jasson Wright MD, [facility]  [phone]  Pulmonary Medicine     ASSESSMENT:     87 year old gentleman, former smoker, followed by Dr. Alicja Rob of our practice for asthma/COPD overlap syndrome and obstructive sleep apnea being treated conservatively. He has no history of TOM colonization/infection as listed in the "past medical history". 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 The patient was admitted in April after many months of outpatient therapy for chronic right foot wounds and leg pain. 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 Post-operative course was notable for poor wound healing (was considered for an AKA), hypoxic respiratory failure due to a COPD exacerbation, restrictive lung disease and aspiration pneumonia. He required ICU admission for an insulin infusion for steroid induced hyperglycemia, heparin gtt for PAD and encephalopathy related to narcotic analgesics for post-operative pain. The patient was found to have dysphagia but the family decided against non-oral feeding and for "pleasure feeds". The patient was discharged on May 3rd. He has been at rehab since that time. He recently was fitted for a prosthesis in hopes of starting ambulation. He has a wound on his left foot. He is sent to the ER shortly after decreasing his lasix dose due to hypernatremia with shortness of breath, hypoxemia and "gurgling" in his chest. The patient has a cough with difficulty expectorating sputum. He has chest congestion and wheeze. No fevers, chills or sweats. No chest pain/pressure or palpitations. Chest radiograph is abnormal.    CT -> patent central airways - centrilobular emphysema - moderate left and small right pleural effusions with associated partial right and complete left lower lobe compressive atelectasis - patchy and nodular areas of consolidation throughout the aerated portions of the right lung dependently - other nodular areas of consolidation in the posterior left upper lobe persist although have improved    multifactorial hypoxemia and chronic hypercapnic respiratory failure  1) COPD/emphysema with exacerbation  2) bilateral left > right pleural effusions with associated atelectasis due to ischemic and valvular heart disease  3) 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 and respiratory muscle weakness  4) pneumonia perhaps related to aspiration  5) no evidence of pulmonary embolism    PLAN/RECOMMENDATIONS:    oxygen supplementation to keep saturation greater than 92% - currently on a 3lpm nasal canula  s/p a large volume left sided thoracenteses 9/15 -> 1000cc - transudative appearing fluid - the right effusion was too small to "tap" safely  follow-up chest CT ordered  day # 6/7 of zosyn/doxycycline   transition solumedrol -> prednisone 50mg daily x 5 days  continue albuterol/atrovent nebs q6h  continue continue pulmicort 0.5mg nebs q12h - give after duoneb - rinse mouth after use  robitussin DM 300mg 4 times daily  continue singulair 10mg @ bedtime  acapella device/incentive spirometer  cardiology follow-up noted    ECHO -> severe segmental left ventricular systolic dysfunction - the mid to distal anteroseptum is severely hypokinetic - the mid to basal inferolateral wall is hypokinetic - icreased E/e'  is consistent with elevated left ventricular filling pressure    cardiac meds: ASA/lipitor/entresto/metoprolol/lasix  proscar/flomax - hudson catheter is in place  GI/DVT prophylaxis  bowel regimen  out of bed and into the chair    Will follow with you. Plan of care discussed with the patient at bedside and with Dr. Jass[facility] Wright [facility], [facility]  [phone]  Pulmonary Medicine

## 2022-09-20 NOTE — PROGRESS NOTE ADULT - ASSESSMENT
88 yo male w h/o asthma/COPD, ZACKARY, HTN, HLD, DM, CKD, CVA with residual left sided weakness and left facial droop, CHF (mild systolic dysfunction) and atrial fibrillation with SSS s/p PPM.   PMH earlier this year: The patient was admitted in April after many months of outpatient therapy for chronic right foot wounds and leg pain. 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   Post-operative course was notable for poor wound healing (was considered for an AKA), hypoxic respiratory failure due to a COPD exacerbation, restrictive lung disease and aspiration pneumonia. He required ICU admission for an insulin infusion for steroid induced hyperglycemia, heparin gtt for PAD and encephalopathy related to narcotic analgesics for post-operative pain.   The patient was found to have dysphagia but the family decided against non-oral feeding and opted for "pleasure feeds".   The patient was discharged on May 3rd.   He has been at rehab since that time. He recently was fitted for a prosthesis in hopes of starting ambulation.   He has a wound on his left foot.   His LAsix was lowered 2/2 hypernatremia few days PTA  He is sent to the ER with shortness of breath, hypoxemia and "gurgling" in his chest.   The patient has a cough with difficulty expectorating sputum. He has chest congestion and wheeze. No fevers, chills or sweats. No chest pain/pressure or palpitations. Chest radiograph is abnormal.    CT -> patent central airways - centrilobular emphysema - moderate left and small right pleural effusions with associated partial right and complete left lower lobe compressive atelectasis - patchy and nodular areas of consolidation throughout the aerated portions of the right lung dependently - other nodular areas of consolidation in the posterior left upper lobe persist although have improved    multifactorial hypoxemia and chronic hypercapnic respiratory failure  1) COPD/emphysema with exacerbation  2) bilateral left > right pleural effusions with associated atelectasis due to ischemic and valvular heart disease  3) 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 and respiratory muscle weakness  4) Aspiration PNA  5) acute on chronic systolic CHF    ptn was seen by cardiology and pulmonary    on  3lpm nasal canula  right thoracenteses done today by pulm: 1 liter removed w resolution of dyspnea  as per pulm: the left effusion seems to small to "tap" safely  zosyn/doxycycline - all cx NTD, completing 7 day course 2/21  switched to po Prednisone  albuterol/atrovent nebs q6h  pulmicort 0.5mg nebs q12h - give after duoneb - rinse mouth after use  robitussin DM 300mg 4 times daily  singulair 10mg @ bedtime  acapella device/incentive spirometer  cardiac meds: lipitor/losartan/metoprolol/IV lasix 40 mg q12H  strict is and os  daily weights  TTE is stable  PPM interrogated  Afib on Coumadin  endo consult for regulating blood glucose, BGM remain elevated, endo following  proscar/flomax - hudson catheter is in place  GI/DVT prophylaxis  bowel regimen

## 2022-09-21 NOTE — PROGRESS NOTE ADULT - ASSESSMENT
ASSESSMENT:     87 year old gentleman, former smoker, followed by Dr. Alicja Rob of our practice for asthma/COPD overlap syndrome and obstructive sleep apnea being treated conservatively. He has no history of TOM colonization/infection as listed in the "past medical history". 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 The patient was admitted in April after many months of outpatient therapy for chronic right foot wounds and leg pain. 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 Post-operative course was notable for poor wound healing (was considered for an AKA) and hypoxic respiratory failure due to a COPD exacerbation, restrictive lung disease and aspiration pneumonia. He required ICU admission for an insulin infusion for steroid induced hyperglycemia, heparin gtt for PAD and encephalopathy related to narcotic analgesics for post-operative pain. The patient was found to have dysphagia but the family decided against non-oral feeding and for "pleasure feeds". The patient was discharged on May 3rd. He has been at rehab since that time. He recently was fitted for a prosthesis in hopes of starting ambulation. He has a wound on his left foot. He is sent to the ER shortly after decreasing his lasix dose due to hypernatremia with shortness of breath, hypoxemia and "gurgling" in his chest. The patient has a cough with difficulty expectorating sputum. He has chest congestion and wheeze. No fevers, chills or sweats. No chest pain/pressure or palpitations. Chest radiograph is abnormal.    CT -> patent central airways - centrilobular emphysema - moderate left and small right pleural effusions with associated partial right and complete left lower lobe compressive atelectasis - patchy and nodular areas of consolidation throughout the aerated portions of the right lung dependently - other nodular areas of consolidation in the posterior left upper lobe persist although have improved    multifactorial hypoxemia and chronic hypercapnic respiratory failure  1) COPD/emphysema with exacerbation  2) bilateral left > right pleural effusions with associated atelectasis due to ischemic and valvular heart disease  3) 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 and respiratory muscle weakness  4) pneumonia perhaps related to aspiration  5) no evidence of pulmonary embolism  6) extensive mucous plugging with atelectasis    PLAN/RECOMMENDATIONS:    oxygen supplementation to keep saturation greater than 92% - currently on a 2lpm nasal canula -> taper as tolerated  s/p a large volume left sided thoracenteses 9/15 -> 1000cc - transudative appearing fluid - the right effusion was too small to "tap" safely  follow-up chest CT -> centrilobular and paraseptal emphysema - retained secretions in the trachea and new extensive mucous plugging in the left lower lobe - stable tubular nodularity in the right middle lobe and posterior right upper lobe - extensive centrilobular and branching linear densities in the right middle and right lower lobes some of which are calcified - ill-defined calcifications in the left hepatic lobe - decreased moderate left and small right pleural effusions.  has completed a 7 day course of zosyn/doxycycline   prednisone 50mg daily x 5 days  continue albuterol/atrovent nebs q6h  continue continue pulmicort 0.5mg nebs q12h - give after duoneb - rinse mouth after use  start mucomyst and hypertonic saline nebs  robitussin DM 300mg 4 times daily  continue singulair 10mg @ bedtime  chest vest ordered  cardiology follow-up noted    ECHO -> severe segmental left ventricular systolic dysfunction - the mid to distal anteroseptum is severely hypokinetic - the mid to basal inferolateral wall is hypokinetic - icreased E/e'  is consistent with elevated left ventricular filling pressure    cardiac meds: ASA/lipitor/entresto/metoprolol/lasix  proscar/flomax - hudson catheter is in place  GI/DVT prophylaxis  bowel regimen  out of bed and into the chair    Will follow with you. Plan of care discussed with the patient at bedside and with Dr. Jasson Wright MD, [facility]  [phone]  Pulmonary Medicine

## 2022-09-21 NOTE — PROGRESS NOTE ADULT - ASSESSMENT
TTE with Doppler (w/Cont) (04.03.22 @ 15:07) >  mild aortic stenosis. . Mild left ventricular systolic dysfunction. The inferior wall, and the inferoseptum are hypokinetic.  Echo 9/15/22: .EF 35-40% Severe segmental left ventricular systolic dysfunction. The mid to distal anteroseptum and severely hypokinetic. The mid to basal inferolateral wall is hypokinetic.      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 chf   -cta chest with no pe, Moderate left and small right pleural effusions, increased since May  2022, with worsening lower lobe compressive atelectasis. New right lung patchy and nodular areas of consolidation, likely  infectious/inflammatory  -Pulm fu, IV steroids, IV abx,  sp thoracentesis 9/15   -change to PO diuretics  -echo with .EF 35-40% Severe segmental left ventricular systolic dysfunction. The mid to distal anteroseptum and severely hypokinetic. The mid to basal inferolateral wall is hypokinetic.    # acute on chronic systolic CHF   -transition to 40mg PO BID  -repeat echo with moderate severe lv dysfxn, unchanged from echo  4/2022  -continue medical management of CMP/HF  -ecg, no ischemic changes, HS trop elevated secondary to hypoxia/ chf/ resp infection   -c/w bb / entresto     #Pafib, sp PPM   -Please call EP to interrogate PPM   -c/w BB /ac       #mild as   -echo stable         35 minutes spent on total encounter; more than 50% of the visit was spent counseling and/or coordinating care by the attending physician.

## 2022-09-21 NOTE — PROGRESS NOTE ADULT - NSPROGADDITIONALINFOA_GEN_ALL_CORE
-Plan discussed with pt/team.  Contact info: [phone] (24/7). pager [phone]  [email or web address] password [email or web address]  Spent 27 assessing pt/labs/meds and discussing plan of care with primary team  Adjusting insulin  Discharge plan  Follow up care

## 2022-09-21 NOTE — PROGRESS NOTE ADULT - PROBLEM SELECTOR PLAN 1
- Test BG TID AC & QHS while eating regular meals and Q6H while NPO  - Change Glargine dose to 20 units QHS  - Decrease insulin Lispro to 20-15-15  units TID with meals, hold if NPO or if eating less than 50% of meals  - Change [employer] scale TID with meals and hs to  low dose due to CKD   - Needs RD consult with wife  Discharge:  - Will be determined according to BG/insulin needs/PO intake and steroid therapy at time of discharge.  -Likely Levemir QHS and - Lispro  TID with meals. Doses TBD> insulin given by patient's wife   - Will need outpatient follow up with nephrology, opthalmology   - Can follow up with primary care for glucose management. Endo practice if pt/family wishes at [address] [facility] [address]. Phone .  -Make sure pt has Rx for all DM supplies and insulin/ DM meds.

## 2022-09-21 NOTE — PROGRESS NOTE ADULT - ASSESSMENT
88 yo M w/h/o uncontrolled T2DM (A1C 9.1%) on basal/bolus insulin at home. DM c/b PAD> s/p R BKA/ CVA with L sided weakness/ CKD. Also h/o BPH, COPD (not on home O2), HTN, HLD, afib, CHF. Here with increasing  SOB "gurgling" sounds in chest. Found to have hypercapnic respiratory failure in the setting of aspiration pneumonia, COPD exacerbation, restrictive lung disease as well as acute on chronic systolic CHF. Endocrinology consulted for hyperglycemia management in the setting of T2DM exacerbated by steroid. Remains on high Prednisone dose of 50mg but with BG tightly control specially with fasting hypoglycemia for the past 2 days per BMP values. Pt eating well so will lower insulin doses to BG goal 100 to 180s. Noted creat up today    Home regimen: Levemir 30 units q hs plus Lispro 3 ac meals

## 2022-09-21 NOTE — PROGRESS NOTE ADULT - ASSESSMENT
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, T2DM, CHF on lasix (recently decreased dose 2 days ago 2/2 hypernatremia), presents to the ED c/o increasing shortness of breath and "gurgling" sounds in chest, admitted for hypercapnic respiratory failure in the setting of aspiration pneumonia, COPD exacerbation, restrictive lung disease as well as acute on chronic systolic CHF now with KARLOS      1) KARLOS  b/l cr previously was around 1.7mg/dl however from recently hospitalization pts new baseline around 1mg/dl likely 2/2 muscle loss  ddx includes karlos 2/2 hemodynamic effects vs cardiorenal  cont with hudson from previous retention  currently pt is on entresto which we can continue but need to watch carefully  Urine lytes indicating intravascular depletion--> will D/C LASIX FOR NOW  c/w hudson  avoid iv contrast   trend bmp    2) Hypokalemia- likely 2/2 to lasix  s/p kcl 40meq po x 1  trend k    Dr Knight  [phone]

## 2022-09-22 NOTE — PROVIDER CONTACT NOTE (HYPOGLYCEMIA EVENT) - NS PROVIDER CONTACT NOTE-PROVIDER NOTIFIED-HYPO
DATE: 10/4/2017  PATIENT: Andre Padgett    Supervising Physician: Hosea Dudley M.D.    CHIEF COMPLAINT: back pain    HISTORY:  Andre Padgett is a 46 y.o. male who works at the [employer] here for initial evaluation of low back pain (Back - 3). The pain has been present intermittently for 3-4 months.  He slipped in his driveway and has had intermittent back pain since then.   The patient describes the pain as throbbing.  The pain is worse with nothing in particular and improved by walking. There is no associated numbness and tingling. There is no subjective weakness. Prior treatments have included a medrol dose pack, flexeril and advil, but no prescription anti-inflammatory medications, physical therapy, ESIs or surgery.    The patient denies myelopathic symptoms such as handwriting changes or difficulty with buttons/coins/keys. Denies perineal paresthesias, bowel/bladder dysfunction.    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    Anxiety     Generalized headaches     Hyperlipidemia      Past Surgical History:   Procedure Laterality Date    BRAIN SURGERY  2011    angiogram/coiling       Current Medications:   Current Outpatient Prescriptions:     alprazolam (XANAX) 0.5 MG tablet, take 1 tablet by mouth at bedtime if needed, Disp: 30 tablet, Rfl: 1    ascorbic acid (VITAMIN C) 100 MG tablet, Take 100 mg by mouth once daily., Disp: , Rfl:     esomeprazole (NEXIUM) 40 MG capsule, Take 1 capsule (40 mg total) by mouth before breakfast., Disp: 30 capsule, Rfl: 11    methylPREDNISolone (MEDROL DOSEPACK) 4 mg tablet, use as directed, Disp: 1 Package, Rfl: 0    ondansetron (ZOFRAN) 4 MG tablet, Take 1 tablet (4 mg total) by mouth every 6 (six) hours., Disp: 12 tablet, Rfl: 0    atorvastatin (LIPITOR) 40 MG tablet, Take 1 tablet (40 mg total) by mouth once daily., Disp: 90 tablet, Rfl: 3     "lorazepam (ATIVAN) 1 MG tablet, Take 1 tablet (1 mg total) by mouth as needed for Anxiety (MRI). 1 tab 45 minutes prior to MRI.  May repeat x1 if needed., Disp: 2 tablet, Rfl: 0    meloxicam (MOBIC) 15 MG tablet, Take 1 tablet (15 mg total) by mouth once daily., Disp: 30 tablet, Rfl: 0    Current Facility-Administered Medications:     albuterol nebulizer solution 1.25 mg, 1.25 mg, Nebulization, 1 time in Clinic/HOD, Anjelica Loyola MD    Social History:   Social History     Social History    Marital status:      Spouse name: N/A    Number of children: N/A    Years of education: N/A     Occupational History    Not on file.     Social History Main Topics    Smoking status: Never Smoker    Smokeless tobacco: Never Used    Alcohol use Yes      Comment: ocassionally - twice a week    Drug use: No    Sexual activity: Not on file     Other Topics Concern    Not on file     Social History Narrative    No narrative on file       REVIEW OF SYSTEMS:  Constitution: Negative. Negative for chills, fever and night sweats.   Cardiovascular: Negative for chest pain and syncope.   Respiratory: Negative for cough and shortness of breath.   Gastrointestinal: See HPI. Negative for nausea/vomiting. Negative for abdominal pain.  Genitourinary: See HPI. Negative for discoloration or dysuria.  Skin: Negative for dry skin, itching and rash.   Hematologic/Lymphatic: Negative for bleeding problem. Does not bruise/bleed easily.   Musculoskeletal: Negative for falls and muscle weakness.   Neurological: See HPI. No seizures.   Endocrine: Negative for polydipsia, polyphagia and polyuria.   Allergic/Immunologic: Negative for hives and persistent infections.    PHYSICAL EXAMINATION:    Ht 5' 10" (1.778 m)   Wt 89.5 kg (197 lb 6.8 oz)   BMI 28.33 kg/m²     General: The patient is a very pleasant 46 y.o. male in no apparent distress, the patient is oriented to person, place and time.   Psych: Normal mood and affect  HEENT: " Vision grossly intact, hearing intact to the spoken word.  Lungs: Respirations unlabored.  Gait: Normal station and gait, no difficulty with toe or heel walk.   Skin: Dorsal lumbar skin negative for rashes, lesions, hairy patches and surgical scars.  Range of motion: Lumbar range of motion is acceptable. There is mild right sided lumbar tenderness to palpation.  Spinal Balance: Global saggital and coronal spinal balance acceptable, no significant for scoliosis and kyphosis.  Musculoskeletal: No pain with the range of motion of the bilateral hips. No trochanteric tenderness to palpation.  Vascular: Bilateral lower extremities warm and well perfused, Dorsalis pedis pulses 2+ bilaterally.  Neurological: Normal strength and tone in all major motor groups in the bilateral lower extremities. Normal sensation to light touch in the L2-S1 dermatomes bilaterally.  Deep tendon reflexes symmetric 2+ in the bilateral lower extremities.  Negative Babinski bilaterally.  Straight leg raise negative bilaterally.     IMAGING:   Today I personally reviewed AP, Lat and Flex/Ex upright L-spine films that demonstrate normal disc spacing and alignment.  Mild degenerative changes.     ASSESSMENT/PLAN:    Diagnoses and all orders for this visit:    Right-sided low back pain without sciatica, unspecified chronicity  -     Ambulatory Referral to Physical/Occupational Therapy    Other orders  -     meloxicam (MOBIC) 15 MG tablet; Take 1 tablet (15 mg total) by mouth once daily.        Referral for PT on [facility] placed today.  Follow up after therapy if symptoms persist.     We discussed the department policy regarding pain medications.  Patient understands and agrees.       Follow up after therapy in about 6 weeks if symptoms persist, sooner with any new or worsening symptoms.     Return if symptoms worsen or fail to improve.     Katt Khanna NP

## 2022-09-22 NOTE — PROGRESS NOTE ADULT - ASSESSMENT
86 yo M w/h/o uncontrolled T2DM (A1C 9.1%) on basal/bolus insulin at home. DM c/b PAD> s/p R BKA/ CVA with L sided weakness/ CKD. Also h/o BPH, COPD (not on home O2), HTN, HLD, afib, CHF. Here with increasing  SOB "gurgling" sounds in chest. Found to have hypercapnic respiratory failure in the setting of aspiration pneumonia, COPD exacerbation, restrictive lung disease as well as acute on chronic systolic CHF. Endocrinology consulted for hyperglycemia management in the setting of T2DM exacerbated by steroid. BG values at goal yesterday but hypoglycemic this AM. Will reduced basal/bolus as pt requiring less insulin overnight due to Prednisone mechanism of action. Encouraged pt to eat small snack at bedtime to prevent overnight drops. BG goal (100-180mg/dl).

## 2022-09-22 NOTE — PROGRESS NOTE ADULT - ASSESSMENT
ASSESSMENT:     87 year old gentleman, former smoker, followed by Dr. Alicja Rob of our practice for asthma/COPD overlap syndrome and obstructive sleep apnea being treated conservatively. He has no history of TOM colonization/infection as listed in the "past medical history". 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 The patient was admitted in April after many months of outpatient therapy for chronic right foot wounds and leg pain. 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 Post-operative course was notable for poor wound healing (was considered for an AKA) and hypoxic respiratory failure due to a COPD exacerbation, restrictive lung disease and aspiration pneumonia. He required ICU admission for an insulin infusion for steroid induced hyperglycemia, heparin gtt for PAD and encephalopathy related to narcotic analgesics for post-operative pain. The patient was found to have dysphagia but the family decided against non-oral feeding and for "pleasure feeds". The patient was discharged on May 3rd. He has been at rehab since that time. He recently was fitted for a prosthesis in hopes of starting ambulation. He has a wound on his left foot. He is sent to the ER shortly after decreasing his lasix dose due to hypernatremia with shortness of breath, hypoxemia and "gurgling" in his chest. The patient has a cough with difficulty expectorating sputum. He has chest congestion and wheeze. No fevers, chills or sweats. No chest pain/pressure or palpitations. Chest radiograph is abnormal.    CT -> patent central airways - centrilobular emphysema - moderate left and small right pleural effusions with associated partial right and complete left lower lobe compressive atelectasis - patchy and nodular areas of consolidation throughout the aerated portions of the right lung dependently - other nodular areas of consolidation in the posterior left upper lobe persist although have improved    multifactorial hypoxemia and chronic hypercapnic respiratory failure  1) COPD/emphysema with exacerbation  2) bilateral left > right pleural effusions with associated atelectasis due to ischemic and valvular heart disease  3) 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 and respiratory muscle weakness  4) pneumonia perhaps related to aspiration  5) no evidence of pulmonary embolism  6) extensive mucous plugging with atelectasis    PLAN/RECOMMENDATIONS:    oxygen supplementation to keep saturation greater than 92% - currently on a 2lpm nasal canula -> taper as tolerated  s/p a large volume left sided thoracenteses 9/15 -> 1000cc - transudative appearing fluid - the right effusion was too small to "tap" safely  follow-up chest CT -> centrilobular and paraseptal emphysema - retained secretions in the trachea and new extensive mucous plugging in the left lower lobe - stable tubular nodularity in the right middle lobe and posterior right upper lobe - extensive centrilobular and branching linear densities in the right middle and right lower lobes some of which are calcified - ill-defined calcifications in the left hepatic lobe - decreased moderate left and small right pleural effusions.  has completed a 7 day course of zosyn/doxycycline   prednisone 50mg daily x 5 days  continue albuterol/atrovent nebs q6h  continue pulmicort 0.5mg nebs q12h - give after duoneb - rinse mouth after use  continue mucomyst and hypertonic saline nebs  robitussin DM 300mg 4 times daily  continue singulair 10mg @ bedtime  chest vest 3 times daily  the patient is a poor candidate for bronchoscopy for pulmonary toilet given severe underlying cardiopulmonary disease  cardiology follow-up noted    ECHO -> severe segmental left ventricular systolic dysfunction - the mid to distal anteroseptum is severely hypokinetic - the mid to basal inferolateral wall is hypokinetic - icreased E/e'  is consistent with elevated left ventricular filling pressure    cardiac meds: ASA/lipitor/entresto/metoprolol - lasix discontinued due to evidence of intravascular volume depleation  proscar/flomax - hudson catheter is in place  GI/DVT prophylaxis  bowel regimen  glucose control  out of bed and into the chair    Will follow with you. Plan of care discussed with the patient at bedside and with Dr. Jasson Wright MD, [facility]  [phone]  Pulmonary Medicine

## 2022-09-22 NOTE — PROGRESS NOTE ADULT - ASSESSMENT
88 yo male w h/o asthma/COPD, ZAKCARY, HTN, HLD, DM, CKD, CVA with residual left sided weakness and left facial droop, CHF (mild systolic dysfunction) and atrial fibrillation with SSS s/p PPM.   PMH earlier this year: The patient was admitted in April after many months of outpatient therapy for chronic right foot wounds and leg pain. 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   Post-operative course was notable for poor wound healing (was considered for an AKA), hypoxic respiratory failure due to a COPD exacerbation, restrictive lung disease and aspiration pneumonia. He required ICU admission for an insulin infusion for steroid induced hyperglycemia, heparin gtt for PAD and encephalopathy related to narcotic analgesics for post-operative pain.   The patient was found to have dysphagia but the family decided against non-oral feeding and opted for "pleasure feeds".   The patient was discharged on May 3rd.   He has been at rehab since that time. He recently was fitted for a prosthesis in hopes of starting ambulation.   He has a wound on his left foot.   His LAsix was lowered 2/2 hypernatremia few days PTA  He is sent to the ER with shortness of breath, hypoxemia and "gurgling" in his chest.   The patient has a cough with difficulty expectorating sputum. He has chest congestion and wheeze. No fevers, chills or sweats. No chest pain/pressure or palpitations. Chest radiograph is abnormal.    CT -> patent central airways - centrilobular emphysema - moderate left and small right pleural effusions with associated partial right and complete left lower lobe compressive atelectasis - patchy and nodular areas of consolidation throughout the aerated portions of the right lung dependently - other nodular areas of consolidation in the posterior left upper lobe persist although have improved    multifactorial hypoxemia and chronic hypercapnic respiratory failure  1) COPD/emphysema with exacerbation  2) bilateral left > right pleural effusions with associated atelectasis due to ischemic and valvular heart disease  3) 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 and respiratory muscle weakness  4) Aspiration PNA  5) acute on chronic systolic CHF    ptn was seen by cardiology and pulmonary    on  3lpm nasal canula  9/15: right thoracenteses done today by pulm: 1 liter removed w resolution of dyspnea  9/22: 600 cc therapeutic L Thoracentesis done,   worsening KARLOS: off ENTRESTO and LASIX   zosyn/doxycycline - all cx NTD, completed 7 day course 2/21  switched to po Prednisone, has steroid induced Hyperglycemia  albuterol/atrovent nebs q6h  pulmicort 0.5mg nebs q12h - give after duoneb - rinse mouth after use  robitussin DM 300mg 4 times daily  singulair 10mg @ bedtime  acapella device/incentive spirometer  cardiac meds: lipitor/losartan/metoprolol/IV lasix 40 mg q12H  strict is and os  daily weights  TTE is stable  PPM interrogated  Afib on Coumadin  endo consult for regulating blood glucose, BGM remain elevated, endo following  proscar/flomax - hudson catheter is in place  GI/DVT prophylaxis  bowel regimen

## 2022-09-22 NOTE — PROVIDER CONTACT NOTE (HYPOGLYCEMIA EVENT) - NS PROVIDER CONTACT BACKGROUND-HYPO
Age: 87y    Gender: Male    POCT Blood Glucose:  339 mg/dL (09-22-22 @ 17:12)  343 mg/dL (09-22-22 @ 17:10)  267 mg/dL (09-22-22 @ 12:32)  94 mg/dL (09-22-22 @ 09:45)  56 mg/dL (09-22-22 @ 09:09)  57 mg/dL (09-22-22 @ 08:51)  53 mg/dL (09-22-22 @ 08:49)  126 mg/dL (09-21-22 @ 22:29)      eMAR:atorvastatin   80 milliGRAM(s) Oral (09-21-22 @ 22:53)    finasteride   5 milliGRAM(s) Oral (09-22-22 @ 12:51)    insulin glargine Injectable (LANTUS)   20 Unit(s) SubCutaneous (09-21-22 @ 22:57)    insulin lispro (ADMELOG) corrective regimen sliding scale   4 Unit(s) SubCutaneous (09-22-22 @ 17:50)   3 Unit(s) SubCutaneous (09-22-22 @ 12:53)    insulin lispro Injectable (ADMELOG)   12 Unit(s) SubCutaneous (09-22-22 @ 17:50)    insulin lispro Injectable (ADMELOG)   12 Unit(s) SubCutaneous (09-22-22 @ 12:53)    levothyroxine   25 MICROGram(s) Oral (09-22-22 @ 06:53)    predniSONE   Tablet   50 milliGRAM(s) Oral (09-22-22 @ 06:54)

## 2022-09-22 NOTE — PROGRESS NOTE ADULT - ASSESSMENT
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, T2DM, CHF on lasix (recently decreased dose 2 days ago 2/2 hypernatremia), presents to the ED c/o increasing shortness of breath and "gurgling" sounds in chest, admitted for hypercapnic respiratory failure in the setting of aspiration pneumonia, COPD exacerbation, restrictive lung disease as well as acute on chronic systolic CHF now with KARLOS      1) KARLOS  b/l cr previously was around 1.7mg/dl however from recently hospitalization pts new baseline around 1mg/dl likely 2/2 muscle loss  ddx includes karlos 2/2 hemodynamic effects vs cardiorenal  cont with hudson from previous retention  currently pt is on entresto which we can continue but need to watch carefully  Urine lytes indicating intravascular depletion--> will D/C LASIX FOR NOW  c/w hudson  BP still noted low, BUN/creat still rising -> STOP Entresto and reeval  avoid iv contrast   trend bmp    2) Hypokalemia- likely 2/2 to lasix  s/p kcl 40meq po x 1  trend k      For any question, call:  Cell # [phone]  Pager # [phone]  Callback # [phone]

## 2022-09-22 NOTE — PROGRESS NOTE ADULT - PROBLEM SELECTOR PLAN 1
Test BG TID AC & QHS while eating regular meals and Q6H while NPO  - Decrease Lantus 10 units QHS  - Decrease insulin Lispro to 18-12-12  units TID with meals, hold if NPO or if eating less than 50% of meals  - C/w Admelog low Correctional scale TID with meals and hs low dose due to CKD   - cons carb diet w/snack  -Notify endocrine service for any change to steroid regimen.   Discharge:  - Will be determined according to BG/insulin needs/PO intake and steroid therapy at time of discharge.  -Likely Levemir QHS and - Lispro  TID with meals. Doses TBD> insulin given by patient's wife   - Will need outpatient follow up with nephrology, opthalmology   - Can follow up with primary care for glucose management. Endo practice if pt/family wishes at [address] [place] [address] [address]. Phone .  -Make sure pt has Rx for all DM supplies and insulin/ DM meds.

## 2022-09-22 NOTE — PROGRESS NOTE ADULT - ASSESSMENT
TTE with Doppler (w/Cont) (04.03.22 @ 15:07) >  mild aortic stenosis. . Mild left ventricular systolic dysfunction. The inferior wall, and the inferoseptum are hypokinetic.  Echo 9/15/22: .EF 35-40% Severe segmental left ventricular systolic dysfunction. The mid to distal anteroseptum and severely hypokinetic. The mid to basal inferolateral wall is hypokinetic.      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 chf   -cta chest with no pe, Moderate left and small right pleural effusions, increased since May  2022, with worsening lower lobe compressive atelectasis. New right lung patchy and nodular areas of consolidation, likely  infectious/inflammatory  -Pulm fu, IV steroids, IV abx,  sp thoracentesis 9/15   -diuretics on hold  -echo with .EF 35-40% Severe segmental left ventricular systolic dysfunction. The mid to distal anteroseptum and severely hypokinetic. The mid to basal inferolateral wall is hypokinetic.    # acute on chronic systolic CHF   -diuretics on hold resume per renal  -repeat echo with moderate severe lv dysfxn, unchanged from echo  4/2022  -continue medical management of CMP/HF  -ecg, no ischemic changes, HS trop elevated secondary to hypoxia/ chf/ resp infection   -c/w bb / entresto     #Pafib, sp PPM     -c/w BB /ac       #mild as   -echo stable         35 minutes spent on total encounter; more than 50% of the visit was spent counseling and/or coordinating care by the attending physician.

## 2022-09-23 NOTE — DISCHARGE NOTE NURSING/CASE MANAGEMENT/SOCIAL WORK - NSDCVIVACCINE_GEN_ALL_CORE_FT
influenza, injectable, quadrivalent, preservative free; 11-Sep-2019 13:29; Jerry Modi (ORACIO); [employer]; [id number] (Exp. Date: 30-Jun-2020); IntraMuscular; Deltoid Left.; 0.5 milliLiter(s); VIS (VIS Published: 15-Aug-2019, VIS Presented: 11-Sep-2019);

## 2022-09-23 NOTE — DIETITIAN INITIAL EVALUATION ADULT - PERTINENT LABORATORY DATA
09-23    142  |  103  |  94<H>  ----------------------------<  168<H>  4.0   |  26  |  1.70<H>    Ca    8.3<L>      23 Sep 2022 07:08    POCT Blood Glucose.: 182 mg/dL (09-23-22 @ 08:32)  A1C with Estimated Average Glucose Result: 9.1 % (09-15-22 @ 07:22)  A1C with Estimated Average Glucose Result: 6.8 % (04-02-22 @ 12:21)

## 2022-09-23 NOTE — PROGRESS NOTE ADULT - PROVIDER SPECIALTY LIST ADULT
Cardiology
Cardiology
Internal Medicine
Pulmonology
Cardiology
Internal Medicine
Internal Medicine
Pulmonology
Cardiology
Cardiology
Internal Medicine
Internal Medicine
Pulmonology
Cardiology
Endocrinology
Internal Medicine
Internal Medicine
Nephrology
Endocrinology

## 2022-09-23 NOTE — PROGRESS NOTE ADULT - ASSESSMENT
86 yo M w/h/o uncontrolled T2DM (A1C 9.1%) on basal/bolus insulin at home. DM c/b PAD> s/p R BKA/ CVA with L sided weakness/ CKD. Also h/o BPH, COPD (not on home O2), HTN, HLD, afib, CHF. Here with increasing  SOB "gurgling" sounds in chest. Found to have hypercapnic respiratory failure in the setting of aspiration pneumonia, COPD exacerbation, restrictive lung disease as well as acute on chronic systolic CHF. Endocrinology consulted for hyperglycemia management in the setting of T2DM exacerbated by steroid. Remains on high Prednisone dose of 50mg but with BG still not at goal. Now with prandial hyperglycemia. Will adjust premeal insulin doses to BG goal 100 to 180s. Noted creat high1s stable    Home regimen: Levemir 30 units q hs plus Lispro 3 ac meals

## 2022-09-23 NOTE — DISCHARGE NOTE PROVIDER - NSDCCPCAREPLAN_GEN_ALL_CORE_FT
PRINCIPAL DISCHARGE DIAGNOSIS  Diagnosis: CHF exacerbation  Assessment and Plan of Treatment: Continue medications as prescribed. EF: 35-40%. Continue supplimentary oxygen at 2 L/Min nasal cannula. Continue Coumadin 3mg PO daily at HS for A Fib. Continue using Acapella device for airway clearance due to COPD. Follow up with Primary MD and Pulmonary Dr Wright after transition from rehab.      SECONDARY DISCHARGE DIAGNOSES  Diagnosis: Pneumonia  Assessment and Plan of Treatment:      PRINCIPAL DISCHARGE DIAGNOSIS  Diagnosis: CHF exacerbation  Assessment and Plan of Treatment: Continue medications as prescribed. EF: 35-40%. Continue supplimentary oxygen at 2 L/Min nasal cannula and Acapella device for airway clearance. Continue Coumadin 3mg PO daily at HS for A Fib. Continue using Acapella device for airway clearance due to COPD. Follow up with Primary MD and Pulmonary Dr Wright after transition from rehab.      SECONDARY DISCHARGE DIAGNOSES  Diagnosis: Pneumonia  Assessment and Plan of Treatment:      PRINCIPAL DISCHARGE DIAGNOSIS  Diagnosis: CHF exacerbation  Assessment and Plan of Treatment: Continue medications as prescribed. EF: 35-40%. Continue supplimentary oxygen at 2 L/Min nasal cannula and Acapella device for airway clearance. Continue Coumadin 3mg PO daily at HS for A Fib. Continue using Acapella device for airway clearance due to COPD. Follow up with Primary MD and Pulmonary Dr Wright after transition from rehab.      SECONDARY DISCHARGE DIAGNOSES  Diagnosis: Pneumonia  Assessment and Plan of Treatment:     Diagnosis: DM (diabetes mellitus)  Assessment and Plan of Treatment: ON SUNDAY 9/25 DAILY PREMEAL INSULIN DOSING MUST BE DECREASED TO 5 units PRIOR TO EACH MEAL AS PT WILL COMPLETE THE PREDNISONE ON SATURDAY. PT WILL RISK HYPOGLYCEMIA ON THESE HIGHER DOSES OF PRE-MEAL INSULIN. CONTINUE THE LANTUS AT 10 UNITS.

## 2022-09-23 NOTE — PROGRESS NOTE ADULT - NUTRITIONAL ASSESSMENT
Diet, DASH/TLC:   Sodium & Cholesterol Restricted  Consistent Carbohydrate {Evening Snack} (CSTCHOSN)  Moderately Thick Liquids (MODTHICKLIQS) (09-15-22 @ 13:23) [Active]      Needs RD consult
Diet, DASH/TLC:   Sodium & Cholesterol Restricted  Consistent Carbohydrate {Evening Snack} (CSTCHOSN)  Moderately Thick Liquids (MODTHICKLIQS) (09-15-22 @ 13:23) [Active]
Diet, DASH/TLC:   Sodium & Cholesterol Restricted  Consistent Carbohydrate {Evening Snack} (CSTCHOSN)  Moderately Thick Liquids (MODTHICKLIQS) (09-15-22 @ 13:23) [Active]    Needs RD consult

## 2022-09-23 NOTE — PROGRESS NOTE ADULT - ASSESSMENT
ASSESSMENT:     87 year old gentleman, former smoker, followed by Dr. Alicja Rob of our practice for asthma/COPD overlap syndrome and obstructive sleep apnea being treated conservatively. He has no history of TOM colonization/infection as listed in the "past medical history". 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 The patient was admitted in April after many months of outpatient therapy for chronic right foot wounds and leg pain. 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 Post-operative course was notable for poor wound healing (was considered for an AKA) and hypoxic respiratory failure due to a COPD exacerbation, restrictive lung disease and aspiration pneumonia. He required ICU admission for an insulin infusion for steroid induced hyperglycemia, heparin gtt for PAD and encephalopathy related to narcotic analgesics for post-operative pain. The patient was found to have dysphagia but the family decided against non-oral feeding and for "pleasure feeds". The patient was discharged on May 3rd. He has been at rehab since that time. He recently was fitted for a prosthesis in hopes of starting ambulation. He has a wound on his left foot. He is sent to the ER shortly after decreasing his lasix dose due to hypernatremia with shortness of breath, hypoxemia and "gurgling" in his chest. The patient has a cough with difficulty expectorating sputum. He has chest congestion and wheeze. No fevers, chills or sweats. No chest pain/pressure or palpitations. Chest radiograph is abnormal.    CT -> patent central airways - centrilobular emphysema - moderate left and small right pleural effusions with associated partial right and complete left lower lobe compressive atelectasis - patchy and nodular areas of consolidation throughout the aerated portions of the right lung dependently - other nodular areas of consolidation in the posterior left upper lobe persist although have improved    multifactorial hypoxemia and chronic hypercapnic respiratory failure  1) COPD/emphysema with exacerbation  2) bilateral left > right pleural effusions with associated atelectasis due to ischemic and valvular heart disease  3) 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 and respiratory muscle weakness  4) pneumonia perhaps related to aspiration  5) no evidence of pulmonary embolism  6) extensive mucous plugging with atelectasis    PLAN/RECOMMENDATIONS:    oxygen supplementation to keep saturation greater than 92% - currently on a 2lpm nasal canula -> taper as tolerated  s/p a large volume left sided thoracenteses 9/15 -> 1000cc - transudative appearing fluid  s/p another left sided thoracentesis 9/22 -> 600cc clear yellow fluid removed  post-procedure CXR is without a pneumothorax - right side is clear - small left sided effusion  follow-up chest CT -> centrilobular and paraseptal emphysema - retained secretions in the trachea and new extensive mucous plugging in the left lower lobe - stable tubular nodularity in the right middle lobe and posterior right upper lobe - extensive centrilobular and branching linear densities in the right middle and right lower lobes some of which are calcified - ill-defined calcifications in the left hepatic lobe - decreased moderate left and small right pleural effusions.  has completed a 7 day course of zosyn/doxycycline   completing a 5 day course of prednisone 50mg daily  continue albuterol/atrovent nebs q6h  continue pulmicort 0.5mg nebs q12h - give after duoneb - rinse mouth after use  continue mucomyst and hypertonic saline nebs  robitussin DM 300mg 4 times daily  continue singulair 10mg @ bedtime  chest vest 3 times daily  acapella device hourly  the patient is a poor candidate for bronchoscopy for pulmonary toilet given severe underlying cardiopulmonary disease  cardiology follow-up noted    ECHO -> severe segmental left ventricular systolic dysfunction - the mid to distal anteroseptum is severely hypokinetic - the mid to basal inferolateral wall is hypokinetic - icreased E/e'  is consistent with elevated left ventricular filling pressure    cardiac meds: ASA/lipitor/metoprolol/coumadin - lasix discontinued due to evidence of intravascular volume depletion - entresto discontinued due to renal dysfunction and "soft" blood pressure  proscar/flomax - hudson catheter is in place  GI/DVT prophylaxis  bowel regimen  glucose control - endocrine follow-up noted  out of bed and into the chair    Will follow with you. Plan of care discussed with the patient at bedside and with Dr. Jasson Wright MD, [facility]  [phone]  Pulmonary Medicine

## 2022-09-23 NOTE — DISCHARGE NOTE NURSING/CASE MANAGEMENT/SOCIAL WORK - NSDCPEFALRISK_GEN_ALL_CORE
For information on Fall & Injury Prevention, visit: [email or web address] OR  [email or web address] OR  https://www.cdc.gov/steadi/patient.html

## 2022-09-23 NOTE — PROGRESS NOTE ADULT - PROBLEM SELECTOR PLAN 4
- C/w home Levothyroxine 25 mcg  -Please make sure LT4 is given on an empty stomach at least one hour apart from other meds and food to ensure med absorption. DO NOT GIVE WITH VITAMINS/ANTACIDS  -Follow up levels as  out pt
- C/w home Levothyroxine 25 mcg  -Please make sure LT4 is given on an empty stomach at least one hour apart from other meds and food to ensure med absorption. DO NOT GIVE WITH VITAMINS/ANTACIDS  -Follow up levels as  out pt.    discussed w/pt and ACP  Can be reached via TEAMS/pager: [phone]   office:  [phone] (M-F 9a-5pm)               [phone] (nights/weekends)   [email or web address] password [email or web address]
- C/w home Levothyroxine 25 mcg  -Noted med given with protonix so LT4 can't be absorbed   -Please make sure LT4 is given on an empty stomach at least one hour apart from other meds and food to ensure med absorption. DO NOT GIVE WITH VITAMINS/ANTACIDS  -Follow up levels as  out pt

## 2022-09-23 NOTE — DISCHARGE NOTE PROVIDER - HOSPITAL COURSE
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   PMH earlier this year: The patient was admitted in April after many months of outpatient therapy for chronic right foot wounds and leg pain. 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   Post-operative course was notable for poor wound healing (was considered for an AKA), hypoxic respiratory failure due to a COPD exacerbation, restrictive lung disease and aspiration pneumonia. He required ICU admission for an insulin infusion for steroid induced hyperglycemia, heparin gtt for PAD and encephalopathy related to narcotic analgesics for post-operative pain.   The patient was found to have dysphagia but the family decided against non-oral feeding and opted for "pleasure feeds".   The patient was discharged on May 3rd.   He has been at rehab since that time. He recently was fitted for a prosthesis in hopes of starting ambulation.   He has a wound on his left foot.   His LAsix was lowered 2/2 hypernatremia few days PTA  He is sent to the ER with shortness of breath, hypoxemia and "gurgling" in his chest.   The patient has a cough with difficulty expectorating sputum. He has chest congestion and wheeze. No fevers, chills or sweats. No chest pain/pressure or palpitations. Chest radiograph is abnormal.    CT -> patent central airways - centrilobular emphysema - moderate left and small right pleural effusions with associated partial right and complete left lower lobe compressive atelectasis - patchy and nodular areas of consolidation throughout the aerated portions of the right lung dependently - other nodular areas of consolidation in the posterior left upper lobe persist although have improved    multifactorial hypoxemia and chronic hypercapnic respiratory failure  1) COPD/emphysema with exacerbation  2) bilateral left > right pleural effusions with associated atelectasis due to ischemic and valvular heart disease  3) 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 and respiratory muscle weakness  4) Aspiration PNA  5) acute on chronic systolic CHF    consults called: cardiology, pulmonary, endo, ID    on  3lpm nasal canula  pulm to determine if thoracentesis would be helpful  start ZOsyn for Aspiration PNA- await blood, urine and sputum culture  albuterol/atrovent nebs q6h  pulmicort 0.5mg nebs q12h - give after duoneb - rinse mouth after use  robitussin DM 300mg 4 times daily  singulair 10mg @ bedtime  cont  lipitor/losartan/metoprolol  started IV lasix 40 mg q12H  strict is and os  daily weights  check TTE  EP to interrogate PPM  Afib on Coumadin, checking daily PT/INR  endo consult for regulating blood glucose  proscar/flomax - chronic hudson catheter is in place  DVT prophylaxis not necessary, ptn is on full AC  bowel regimen    Stable for discharge to [facility] with F/U with PMD after discharge from rehab   88 yo male w h/o asthma/COPD, ZACKARY, HTN, HLD, DM, CKD, CVA with residual left sided weakness and left facial droop, CHF (mild systolic dysfunction) and atrial fibrillation with SSS s/p PPM.   PMH earlier this year: The patient was admitted in April after many months of outpatient therapy for chronic right foot wounds and leg pain. 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   Post-operative course was notable for poor wound healing (was considered for an AKA), hypoxic respiratory failure due to a COPD exacerbation, restrictive lung disease and aspiration pneumonia. He required ICU admission for an insulin infusion for steroid induced hyperglycemia, heparin gtt for PAD and encephalopathy related to narcotic analgesics for post-operative pain.   The patient was found to have dysphagia but the family decided against non-oral feeding and opted for "pleasure feeds".   The patient was discharged on May 3rd.   He has been at rehab since that time. He recently was fitted for a prosthesis in hopes of starting ambulation.   He has a wound on his left foot.   His LAsix was lowered 2/2 hypernatremia few days PTA  He is sent to the ER with shortness of breath, hypoxemia and "gurgling" in his chest.   The patient has a cough with difficulty expectorating sputum. He has chest congestion and wheeze. No fevers, chills or sweats. No chest pain/pressure or palpitations. Chest radiograph is abnormal.    CT -> patent central airways - centrilobular emphysema - moderate left and small right pleural effusions with associated partial right and complete left lower lobe compressive atelectasis - patchy and nodular areas of consolidation throughout the aerated portions of the right lung dependently - other nodular areas of consolidation in the posterior left upper lobe persist although have improved    multifactorial hypoxemia and chronic hypercapnic respiratory failure  1) COPD/emphysema with exacerbation  2) bilateral left > right pleural effusions with associated atelectasis due to ischemic and valvular heart disease  3) 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 and respiratory muscle weakness  4) Aspiration PNA  5) acute on chronic systolic CHF    consults called: cardiology, pulmonary, endo, ID    on  3lpm nasal canula  pulm to determine if thoracentesis would be helpful  start ZOsyn for Aspiration PNA- await blood, urine and sputum culture  albuterol/atrovent nebs q6h  pulmicort 0.5mg nebs q12h - give after duoneb - rinse mouth after use  robitussin DM 300mg 4 times daily  singulair 10mg @ bedtime  cont  lipitor/losartan/metoprolol  started IV lasix 40 mg q12H  strict is and os  daily weights  check TTE  EP to interrogate PPM  Afib on Coumadin, checking daily PT/INR  endo consult for regulating blood glucose  proscar/flomax - chronic hudson catheter is in place  DVT prophylaxis not necessary, ptn is on full AC  bowel regimen    Continue supplemental O2 at 2 L/min NC. Continue Acapella device for airway clearance.    Stable for discharge to [facility] with F/U with PMD after discharge from rehab. F/U with Pulmonary seen in patient by Dr Wright.

## 2022-09-23 NOTE — PROGRESS NOTE ADULT - PROBLEM SELECTOR PLAN 3
LDL goal <70  Pt LDL 44  On Crestor 10mg PTA  On atorvastatin 80 mg daily while in hospital  Manage per cardiology team.
LDL goal <70  Pt LDL 44  On Crestor 10mg PTA  On atorvastatin 80 mg daily while in hospital  Manage per cardiology team
LDL goal <70  Pt LDL 44  On Crestor 10mg PTA  On atorvastatin 80 mg daily while in hospital  Manage per cardiology team

## 2022-09-23 NOTE — PROGRESS NOTE ADULT - ASSESSMENT
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   PMH earlier this year: The patient was admitted in April after many months of outpatient therapy for chronic right foot wounds and leg pain. 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   Post-operative course was notable for poor wound healing (was considered for an AKA), hypoxic respiratory failure due to a COPD exacerbation, restrictive lung disease and aspiration pneumonia. He required ICU admission for an insulin infusion for steroid induced hyperglycemia, heparin gtt for PAD and encephalopathy related to narcotic analgesics for post-operative pain.   The patient was found to have dysphagia but the family decided against non-oral feeding and opted for "pleasure feeds".   The patient was discharged on May 3rd.   He has been at rehab since that time. He recently was fitted for a prosthesis in hopes of starting ambulation.   He has a wound on his left foot.   His LAsix was lowered 2/2 hypernatremia few days PTA  He is sent to the ER with shortness of breath, hypoxemia and "gurgling" in his chest.   The patient has a cough with difficulty expectorating sputum. He has chest congestion and wheeze. No fevers, chills or sweats. No chest pain/pressure or palpitations. Chest radiograph is abnormal.    CT -> patent central airways - centrilobular emphysema - moderate left and small right pleural effusions with associated partial right and complete left lower lobe compressive atelectasis - patchy and nodular areas of consolidation throughout the aerated portions of the right lung dependently - other nodular areas of consolidation in the posterior left upper lobe persist although have improved    multifactorial hypoxemia and chronic hypercapnic respiratory failure  1) COPD/emphysema with exacerbation  2) bilateral left > right pleural effusions with associated atelectasis due to ischemic and valvular heart disease  3) 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 and respiratory muscle weakness  4) Aspiration PNA  5) acute on chronic systolic CHF    ptn was seen by cardiology and pulmonary    on  3lpm nasal canula  9/15: right thoracenteses done today by pulm: 1 liter removed w resolution of dyspnea  9/22: 600 cc therapeutic L Thoracentesis done,   worsening KARLOS: off ENTRESTO and LASIX   zosyn/doxycycline - all cx NTD, completed 7 day course 2/21  switched to po Prednisone, has steroid induced Hyperglycemia  albuterol/atrovent nebs q6h  pulmicort 0.5mg nebs q12h - give after duoneb - rinse mouth after use  robitussin DM 300mg 4 times daily  singulair 10mg @ bedtime  acapella device/incentive spirometer  cardiac meds: lipitor/losartan/metoprolol/IV lasix 40 mg q12H  strict is and os  daily weights  TTE is stable  PPM interrogated  Afib on Coumadin  endo consult for regulating blood glucose, BGM remain elevated, endo following  proscar/flomax - hudson catheter is in place  GI/DVT prophylaxis  bowel regimen

## 2022-09-23 NOTE — DISCHARGE NOTE PROVIDER - NSDCMRMEDTOKEN_GEN_ALL_CORE_FT
acetaminophen 325 mg oral tablet: 2 tab(s) orally every 6 hours, As needed, Moderate Pain (4 - 6)  acetylcysteine 20% inhalation solution: 4 milliliter(s) inhaled 3 times a day  aspirin 81 mg oral delayed release tablet: 1 tab(s) orally once a day  atorvastatin 80 mg oral tablet: 1 tab(s) orally once a day (at bedtime)  budesonide 0.5 mg/2 mL inhalation suspension: 0.5 milligram(s) inhaled 2 times a day  cadexomer iodine 0.9% topical gel: 1 application topically once a day  Coumadin 3 mg oral tablet: 1 tab(s) orally once a day (at bedtime)  finasteride 5 mg oral tablet: 1 tab(s) orally once a day  gabapentin 100 mg oral tablet: 1 tab(s) orally 2 times a day  guaifenesin-dextromethorphan 100 mg-10 mg/5 mL oral liquid: 10 milliliter(s) orally every 6 hours  insulin glargine 100 units/mL subcutaneous solution: 10 unit(s) subcutaneous once a day (at bedtime)  insulin lispro 100 units/mL injectable solution: 12 unit(s) injectable once a day (in the evening) before dinner  insulin lispro 100 units/mL injectable solution: 12 unit(s) injectable once a day before lunch  insulin lispro 100 units/mL injectable solution: 18 unit(s) injectable once a day before Breakfast  ipratropium-albuterol 0.5 mg-2.5 mg/3 mL inhalation solution: 3 milliliter(s) inhaled every 6 hours  metoprolol tartrate 50 mg oral tablet: 1 tab(s) orally 2 times a day  montelukast 10 mg oral tablet: 1 tab(s) orally once a day (at bedtime)  Multiple Vitamins with Minerals oral tablet: 1 tab(s) orally once a day  pantoprazole 40 mg oral delayed release tablet: 1 tab(s) orally once a day (before a meal)  polyethylene glycol 3350 oral powder for reconstitution: 17 gram(s) orally once a day  predniSONE 50 mg oral tablet: 1 tab(s) orally once a day for one more dose on 9/24/22 then D/C  senna leaf extract oral tablet: 2 tab(s) orally once a day (at bedtime)  sodium chloride 3% inhalation solution: 4 milliliter(s) inhaled every 12 hours  Synthroid 25 mcg (0.025 mg) oral tablet: 1 tab(s) orally once a day  tamsulosin 0.4 mg oral capsule: 2 cap(s) orally once a day (at bedtime)   acetaminophen 325 mg oral tablet: 2 tab(s) orally every 6 hours, As needed, Moderate Pain (4 - 6)  acetylcysteine 20% inhalation solution: 4 milliliter(s) inhaled 3 times a day  aspirin 81 mg oral delayed release tablet: 1 tab(s) orally once a day  atorvastatin 80 mg oral tablet: 1 tab(s) orally once a day (at bedtime)  budesonide 0.5 mg/2 mL inhalation suspension: 0.5 milligram(s) inhaled 2 times a day  cadexomer iodine 0.9% topical gel: 1 application topically once a day  Coumadin 3 mg oral tablet: 1 tab(s) orally once a day (at bedtime)  finasteride 5 mg oral tablet: 1 tab(s) orally once a day  gabapentin 100 mg oral tablet: 1 tab(s) orally 2 times a day  guaifenesin-dextromethorphan 100 mg-10 mg/5 mL oral liquid: 10 milliliter(s) orally every 6 hours  insulin glargine 100 units/mL subcutaneous solution: 10 unit(s) subcutaneous once a day (at bedtime)  insulin lispro 100 units/mL injectable solution: 14 unit(s) injectable once a day before dinner  insulin lispro 100 units/mL injectable solution: 14 unit(s) injectable once a day before lunch  insulin lispro 100 units/mL injectable solution: 20 unit(s) injectable once a day before Breakfast  ipratropium-albuterol 0.5 mg-2.5 mg/3 mL inhalation solution: 3 milliliter(s) inhaled every 6 hours  metoprolol tartrate 50 mg oral tablet: 1 tab(s) orally 2 times a day  montelukast 10 mg oral tablet: 1 tab(s) orally once a day (at bedtime)  Multiple Vitamins with Minerals oral tablet: 1 tab(s) orally once a day  pantoprazole 40 mg oral delayed release tablet: 1 tab(s) orally once a day (before a meal)  polyethylene glycol 3350 oral powder for reconstitution: 17 gram(s) orally once a day  predniSONE 50 mg oral tablet: 1 tab(s) orally once a day for one more dose on 9/24/22 then D/C  senna leaf extract oral tablet: 2 tab(s) orally once a day (at bedtime)  sodium chloride 3% inhalation solution: 4 milliliter(s) inhaled every 12 hours  Synthroid 25 mcg (0.025 mg) oral tablet: 1 tab(s) orally once a day  tamsulosin 0.4 mg oral capsule: 2 cap(s) orally once a day (at bedtime)

## 2022-09-23 NOTE — DISCHARGE NOTE NURSING/CASE MANAGEMENT/SOCIAL WORK - PATIENT PORTAL LINK FT
You can access the FollowMyHealth Patient Portal offered by [facility] by registering at the following website: [email or web address]. By joining [employer]’s FollowMyHealth portal, you will also be able to view your health information using other applications (apps) compatible with our system.

## 2022-09-23 NOTE — PROGRESS NOTE ADULT - SUBJECTIVE AND OBJECTIVE BOX
CARDIOLOGY FOLLOW UP - Dr. Stephens  DATE OF SERVICE: 9/16/22    CC feels better less sob   no cp       REVIEW OF SYSTEMS:  CONSTITUTIONAL: No fever, weight loss, or fatigue  RESPIRATORY: No cough, wheezing, chills or hemoptysis; No Shortness of Breath  CARDIOVASCULAR: No chest pain, palpitations, passing out, dizziness, or leg swelling  GASTROINTESTINAL: No abdominal or epigastric pain. No nausea, vomiting, or hematemesis; No diarrhea or constipation. No melena or hematochezia.    PHYSICAL EXAM:  T(C): 36.4 (09-16-22 @ 11:17), Max: 36.7 (09-15-22 @ 12:39)  HR: 66 (09-16-22 @ 11:17) (66 - 91)  BP: 119/61 (09-16-22 @ 11:17) (119/61 - 150/74)  RR: 18 (09-16-22 @ 11:17) (18 - 22)  SpO2: 100% (09-16-22 @ 11:17) (92% - 100%)  Wt(kg): --  I&O's Summary    15 Sep 2022 07:01  -  16 Sep 2022 07:00  --------------------------------------------------------  IN: 120 mL / OUT: 1500 mL / NET: -1380 mL    16 Sep 2022 07:01  -  16 Sep 2022 12:15  --------------------------------------------------------  IN: 240 mL / OUT: 0 mL / NET: 240 mL        Appearance: Normal	  Cardiovascular: Normal S1 S2,RR  Respiratory: insp wheezing   Gastrointestinal:  Soft, Non-tender, + BS	  Extremities: + edema       Home Medications:  albuterol 2.5 mg/3 mL (0.083%) inhalation solution: 3 milliliter(s) inhaled every 6 hours (14 Sep 2022 19:13)  aspirin 81 mg oral delayed release tablet: 1 tab(s) orally once a day (14 Sep 2022 19:13)  bacitracin 500 units/g topical ointment: Apply topically to affected area 2 times a day (14 Sep 2022 19:13)  budesonide 0.5 mg/2 mL inhalation suspension: 0.5 milligram(s) inhaled 2 times a day (14 Sep 2022 19:13)  Coumadin 5 mg oral tablet: 1 tab(s) orally once a day (at bedtime) (14 Sep 2022 19:13)  Crestor 10 mg oral tablet: 1 tab(s) orally once a day (at bedtime) (14 Sep 2022 19:13)  finasteride 5 mg oral tablet: 1 tab(s) orally once a day (14 Sep 2022 19:13)  furosemide 20 mg oral tablet: 1 tab(s) orally once a day (14 Sep 2022 19:13)  gabapentin 100 mg oral tablet: 1 tab(s) orally 2 times a day (14 Sep 2022 19:13)  glycopyrrolate 1 mg oral tablet: 1 tab(s) orally 2 times a day (14 Sep 2022 19:13)  insulin lispro 100 units/mL injectable solution: 3 unit(s) injectable 3 times a day (before meals) (14 Sep 2022 19:13)  ipratropium 500 mcg/2.5 mL inhalation solution: 2.5 milliliter(s) inhaled 4 times a day (14 Sep 2022 19:13)  Levemir FlexTouch 100 units/mL subcutaneous solution: 30 unit(s) subcutaneous once a day (at bedtime) (14 Sep 2022 19:13)  losartan 100 mg oral tablet: 1 tab(s) orally once a day (14 Sep 2022 19:13)  metoprolol succinate 25 mg oral tablet, extended release: 1 tab(s) orally once a day (14 Sep 2022 19:13)  montelukast 10 mg oral tablet: 1 tab(s) orally once a day (14 Sep 2022 19:13)  Multiple Vitamins with Minerals oral tablet: 1 tab(s) orally once a day (14 Sep 2022 19:13)  pantoprazole 40 mg oral delayed release tablet: 1 tab(s) orally once a day (14 Sep 2022 19:13)  polyethylene glycol 3350 oral powder for reconstitution: 17 gram(s) orally once a day (14 Sep 2022 19:13)  Santyl 250 units/g topical ointment: Apply topically to affected area 2 times a day (14 Sep 2022 19:13)  senna oral tablet: 2 tab(s) orally once a day (at bedtime) (14 Sep 2022 19:13)  Synthroid 25 mcg (0.025 mg) oral tablet: 1 tab(s) orally once a day (14 Sep 2022 19:13)  tamsulosin 0.4 mg oral capsule: 2 cap(s) orally once a day (at bedtime) (14 Sep 2022 19:13)      MEDICATIONS  (STANDING):  albuterol/ipratropium for Nebulization 3 milliLiter(s) Nebulizer every 6 hours  aspirin enteric coated 81 milliGRAM(s) Oral daily  atorvastatin 80 milliGRAM(s) Oral at bedtime  buDESOnide    Inhalation Suspension 0.5 milliGRAM(s) Inhalation two times a day  dextrose 5%. 1000 milliLiter(s) (100 mL/Hr) IV Continuous <Continuous>  dextrose 5%. 1000 milliLiter(s) (50 mL/Hr) IV Continuous <Continuous>  dextrose 50% Injectable 25 Gram(s) IV Push once  dextrose 50% Injectable 12.5 Gram(s) IV Push once  dextrose 50% Injectable 25 Gram(s) IV Push once  doxycycline IVPB      doxycycline IVPB 100 milliGRAM(s) IV Intermittent every 12 hours  finasteride 5 milliGRAM(s) Oral daily  furosemide   Injectable 40 milliGRAM(s) IV Push every 12 hours  gabapentin 100 milliGRAM(s) Oral two times a day  glucagon  Injectable 1 milliGRAM(s) IntraMuscular once  guaifenesin/dextromethorphan Oral Liquid 10 milliLiter(s) Oral every 6 hours  insulin glargine Injectable (LANTUS) 24 Unit(s) SubCutaneous at bedtime  insulin lispro (ADMELOG) corrective regimen sliding scale   SubCutaneous three times a day before meals  insulin lispro Injectable (ADMELOG) 7 Unit(s) SubCutaneous three times a day before meals  levothyroxine 25 MICROGram(s) Oral daily  methylPREDNISolone sodium succinate Injectable 20 milliGRAM(s) IV Push every 6 hours  metoprolol tartrate 25 milliGRAM(s) Oral two times a day  montelukast 10 milliGRAM(s) Oral at bedtime  multivitamin/minerals 1 Tablet(s) Oral daily  pantoprazole    Tablet 40 milliGRAM(s) Oral before breakfast  piperacillin/tazobactam IVPB.. 3.375 Gram(s) IV Intermittent every 8 hours  polyethylene glycol 3350 17 Gram(s) Oral daily  sacubitril 24 mG/valsartan 26 mG 1 Tablet(s) Oral two times a day  senna 2 Tablet(s) Oral at bedtime  tamsulosin 0.8 milliGRAM(s) Oral at bedtime  warfarin 5 milliGRAM(s) Oral once      TELEMETRY: v paced 	    ECG:  	  RADIOLOGY:   DIAGNOSTIC TESTING:  [ ] Echocardiogram:  =TTE with Doppler (w/Cont) (09.15.22 @ 12:59) >  EF (Visual Estimate): 35-40 %  Doppler Peak Velocity (m/sec): AoV=1.6  ------------------------------------------------------------------------  Observations:  Mitral Valve: Normal mitral valve.  Aortic Valve/Aorta: Normal trileaflet aortic valve. Peak  transaortic valve gradient equals 11 mm Hg, mean  transaortic valve gradient equals 6 mmHg, aortic valve  velocity time integral equals 37 cm. Peak left ventricular  outflow tract gradient equals 3 mm Hg, mean gradient is  equal to 2 mm Hg, LVOT velocity time integral equals 16 cm.  Aortic Root: 3.6 cm.  Ascending Aorta: 2.5 cm.  LVOT diameter: 2.5 cm.  Left Atrium: Normal left atrium.  LA volume index = 25  cc/m2.  Left Ventricle: Endocardial visualization enhanced with  intravenous injection of Ultrasonic Enhancing Agent  (Lumason). Severe segmental left ventricular systolic  dysfunction. The mid to distal anteroseptum and severely  hypokinetic. The mid to basal inferolateral wall is  hypokinetic.  Increased relative wall thickness with normal  left ventricular mass index, consistent with concentric  left ventricular remodeling. Increased E/e'  is consistent  with elevated left ventricular filling pressure.  Right Heart: Normal right atrium. Normal right ventricular  size and function. Normal tricuspid valve. Normal pulmonic  valve. Mild pulmonic regurgitation.  Pericardium/Pleura: Normal pericardium with no pericardial  effusion.  Hemodynamic: Estimated right atrial pressure is 8 mm Hg.  ------------------------------------------------------------------------  Conclusions:  1. Increased relative wall thickness with normal left  ventricular mass index, consistent with concentric left  ventricular remodeling.  2. Endocardial visualization enhanced with intravenous  injection of Ultrasonic Enhancing Agent (Lumason). Severe  segmental left ventricular systolic dysfunction. The mid to  distal anteroseptum and severely hypokinetic. The mid to  basal inferolateral wall is hypokinetic.  3. Increased E/e'  is consistent with elevated left  ventricular filling pressure.  4. Normal right ventricular size and function.  *** Compared with echocardiogram of 4/3/2022, no  significant changes noted.  ------------------------------------------------------------------------  Confirmed on  9/15/2022 - 16:03:54 by Zain Tucker M.D.  ------------------------------------------------------------------------    < end of copied text >    [ ]  Catheterization:  [ ] Stress Test:    OTHER: 	    LABS:	 	    Troponin T, High Sensitivity Result: 165 ng/L [0 - 51] (09-15 @ 07:22)  Troponin T, High Sensitivity Result: 145 ng/L [0 - 51] (09-14 @ 16:27)  Troponin T, High Sensitivity Result: 154 ng/L [0 - 51] (09-14 @ 13:36)                          8.5    7.73  )-----------( 293      ( 15 Sep 2022 07:22 )             30.1     09-15    140  |  102  |  21  ----------------------------<  190<H>  4.1   |  27  |  0.95    Ca    8.7      15 Sep 2022 07:22  Mg     1.8     09-14    TPro  6.0  /  Alb  2.6<L>  /  TBili  0.3  /  DBili  x   /  AST  12  /  ALT  11  /  AlkPhos  79  09-14    PT/INR - ( 16 Sep 2022 05:26 )   PT: 24.0 sec;   INR: 2.05 ratio         PTT - ( 14 Sep 2022 13:36 )  PTT:35.0 sec        
[facility] - [facility] - PROGRESS NOTE    CHIEF COMPLAINT: hypoxemia; chronic hypercapnic respiratory failure; COPD exacerbation; emphysema; acute on chronic systolic heart failure; bilateral pleural effusions; atelectasis; pneumonia    INTERVAL HISTORY: s/p another left sided thoracentesis ~ 600cc clear yellow fluid - post-procedure CXR is without a pneumothorax - right side is clear - small left sided effusion; no shortness of breath or hypoxemia on a 2lpm nasal canula;  persistent cough now on a chest vest and reportedly using an acapella device well; resolved chest congestion and wheeze; no fevers, chills or sweats; no chest pain/pressure or palpitations; 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 is better controlled; 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;     REVIEW OF SYSTEMS:  Constitutional: As per interval history  HEENT: Within normal limits  CV: As per interval history  Resp: As per interval history  GI: dysphagia  : Within normal limits  Musculoskeletal: Within normal limits  Skin: Within normal limits  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 - left facial droop - dysphagia   Psychiatric: Within normal limits  Endocrine: diabetes  Hematologic/Lymphatic: Within normal limits  Allergic/Immunologic: Within normal limits    MEDICATIONS:     Pulmonary "  acetylcysteine 20%  Inhalation 4 milliLiter(s) Inhalation three times a day  albuterol/ipratropium for Nebulization 3 milliLiter(s) Nebulizer every 6 hours  buDESOnide    Inhalation Suspension 0.5 milliGRAM(s) Inhalation two times a day  guaifenesin/dextromethorphan Oral Liquid 10 milliLiter(s) Oral every 6 hours  montelukast 10 milliGRAM(s) Oral at bedtime  sodium chloride 3%  Inhalation 4 milliLiter(s) Inhalation every 12 hours    Anti-microbials:    Cardiovascular:  metoprolol tartrate 50 milliGRAM(s) Oral two times a day  tamsulosin 0.8 milliGRAM(s) Oral at bedtime    Other:  aspirin enteric coated 81 milliGRAM(s) Oral daily  atorvastatin 80 milliGRAM(s) Oral at bedtime  cadexomer iodine 0.9% Gel 1 Application(s) Topical daily  dextrose 5%. 1000 milliLiter(s) IV Continuous <Continuous>  dextrose 5%. 1000 milliLiter(s) IV Continuous <Continuous>  dextrose 50% Injectable 25 Gram(s) IV Push once  dextrose 50% Injectable 12.5 Gram(s) IV Push once  dextrose 50% Injectable 25 Gram(s) IV Push once  finasteride 5 milliGRAM(s) Oral daily  gabapentin 100 milliGRAM(s) Oral two times a day  glucagon  Injectable 1 milliGRAM(s) IntraMuscular once  insulin glargine Injectable (LANTUS) 10 Unit(s) SubCutaneous at bedtime  insulin lispro (ADMELOG) corrective regimen sliding scale   SubCutaneous at bedtime  insulin lispro (ADMELOG) corrective regimen sliding scale   SubCutaneous three times a day before meals  insulin lispro Injectable (ADMELOG) 12 Unit(s) SubCutaneous before dinner  insulin lispro Injectable (ADMELOG) 18 Unit(s) SubCutaneous before breakfast  insulin lispro Injectable (ADMELOG) 12 Unit(s) SubCutaneous before lunch  levothyroxine 25 MICROGram(s) Oral daily  multivitamin/minerals 1 Tablet(s) Oral daily  pantoprazole    Tablet 40 milliGRAM(s) Oral before breakfast  polyethylene glycol 3350 17 Gram(s) Oral daily  predniSONE   Tablet 50 milliGRAM(s) Oral daily  senna 2 Tablet(s) Oral at bedtime    MEDICATIONS  (PRN):  acetaminophen     Tablet .. 650 milliGRAM(s) Oral every 6 hours PRN Moderate Pain (4 - 6)  dextrose Oral Gel 15 Gram(s) Oral once PRN Blood Glucose LESS THAN 70 milliGRAM(s)/deciliter    OBJECTIVE:    Daily Weight in k (23 Sep 2022 04:30)    POCT Blood Glucose.: 288 mg/dL (22 Sep 2022 21:35)  POCT Blood Glucose.: 339 mg/dL (22 Sep 2022 17:12)  POCT Blood Glucose.: 343 mg/dL (22 Sep 2022 17:10)  POCT Blood Glucose.: 267 mg/dL (22 Sep 2022 12:32)  POCT Blood Glucose.: 94 mg/dL (22 Sep 2022 09:45)  POCT Blood Glucose.: 56 mg/dL (22 Sep 2022 09:09)  POCT Blood Glucose.: 57 mg/dL (22 Sep 2022 08:51)  POCT Blood Glucose.: 53 mg/dL (22 Sep 2022 08:49)      PHYSICAL EXAM:       ICU Vital Signs Last 24 Hrs  T(C): 36.3 (23 Sep 2022 04:30), Max: 36.4 (22 Sep 2022 21:09)  T(F): 97.4 (23 Sep 2022 04:30), Max: 97.5 (22 Sep 2022 21:09)  HR: 60 (23 Sep 2022 04:30) (58 - 67)  BP: 107/61 (23 Sep 2022 04:30) (102/62 - 118/52)  BP(mean): --  ABP: --  ABP(mean): --  RR: 18 (23 Sep 2022 04:30) (18 - 18)  SpO2: 96% (23 Sep 2022 04:30) (95% - 97%) on 2lpm nasal canula    General: Awake. Alert. Cooperative. No distress. Appears stated age. Obese.   HEENT: Atraumatic. Normocephalic. Anicteric. Normal oral mucosa. PERRL. EOMI.  Neck: Supple. Trachea midline. Thyroid without enlargement/tenderness/nodules. No carotid bruit. No JVD.	  Cardiovascular: Regular rate and rhythm. Distant S1 S2. No murmurs, rubs or gallops.  Respiratory: Respirations unlabored. Bilateral course breath sounds. Improved breath sounds at the left base. No curvature.  Abdomen: Soft. Non-tender. Non-distended. No organomegaly. No masses. Normal bowel sounds. Adair catheter  Extremities: R BKA stump with healing surgical wound  Pulses: Decreased peripheral pulses LLE  Skin: Venous stasis changes left lower extremity. Left heel skin tear. Dorsal surface of the left foot bullae.  Lymph Nodes: Cervical, supraclavicular and axillary nodes normal  Neurological: Left sided weakness left > arm. Left facial droop. A and O x 3  Psychiatry: Appropriate mood and affect.    LABS:                          9.8    12.65 )-----------( 299      ( 23 Sep 2022 07:11 )             34.4     CBC    WBC  12.65 <==, 10.57 <==    Hemoglobin  9.8 <<==, 9.8 <<==    Hematocrit  34.4 <==, 34.5 <==    Platelets  299 <==, 324 <==      143  |  104  |  84<H>  ----------------------------<  51<LL>    09-22  3.2<L>   |  27  |  1.85<H>      LYTES    sodium  143 <==, 142 <==, 145 <==, 143 <==, 142 <==, 138 <==    potassium   3.2 <==, 3.5 <==, 3.4 <==, 3.4 <==, 3.1 <==, 3.5 <==    chloride  104 <==, 103 <==, 105 <==, 102 <==, 103 <==, 100 <==    carbon dioxide  27 <==, 28 <==, 27 <==, 29 <==, 25 <==, 29 <==    =============================================================================================  RENAL FUNCTION:    Creatinine:   1.85  <<==, 1.73  <<==, 1.48  <<==, 1.44  <<==, 1.56  <<==, 1.76  <<==    BUN:   84 <==, 80 <==, 63 <==, 56 <==, 56 <==, 49 <==    ============================================================================================    calcium   8.1 <==, 8.4 <==, 8.5 <==, 8.7 <==, 8.6 <==, 8.3 <==    phos   3.4 <==    mag   1.8 <==, 1.8 <==, 1.8 <==, 1.9 <==    ============================================================================================  PT/INR - ( 22 Sep 2022 07:30 )   PT: 34.3 sec;   INR: 2.95 ratio      PTT - ( 22 Sep 2022 07:30 )  PTT:34.8 sec    Venous Blood Gas:   @ 13:10  7.38/57/31/34/45.9  VBG Lactate: 1.2    Serum Pro-Brain Natriuretic Peptide: 2388 pg/mL ( @ 13:36)    CARDIAC MARKERS ( 15 Sep 2022 07:22 )  CPK x     /CKMB x     /CKMB Units x        troponin 165 ng/L    CARDIAC MARKERS ( 14 Sep 2022 16:27 )  CPK x     /CKMB x     /CKMB Units x        troponin 145 ng/L    CARDIAC MARKERS ( 14 Sep 2022 13:36 )  CPK x     /CKMB x     /CKMB Units x        troponin 154 ng/L    < from: TTE with Doppler (w/Cont) (09.15.22 @ 12:59) >    Patient name: Martir Heart  YOB: 1935   Age: 87 (M)   MR#: [id number]  Study Date: 9/15/2022  ------------------------------------------------------------------------  Dimensions:    Normal Values:  LA:     3.5    2.0 - 4.0 cm  Ao:     3.6    2.0 - 3.8 cm  SEPTUM: 1.0    0.6 - 1.2 cm  PWT:    1.0    0.6 - 1.1 cm  LVIDd:  4.3    3.0 - 5.6 cm  LVIDs:  3.4    1.8 - 4.0 cm  Derived variables:  LVMI: 78 g/m2  RWT: 0.50  Fractional short: 21 %  EF (Visual Estimate): 35-40 %  Doppler Peak Velocity (m/sec): AoV=1.6  ---------------------------------------------------------------------------------------------------------------  Conclusions:  1. Increased relative wall thickness with normal left  ventricular mass index, consistent with concentric left  ventricular remodeling.  2. Endocardial visualization enhanced with intravenous  injection of Ultrasonic Enhancing Agent (Lumason). Severe  segmental left ventricular systolic dysfunction. The mid to  distal anteroseptum and severely hypokinetic. The mid to  basal inferolateral wall is hypokinetic.  3. Increased E/e'  is consistent with elevated left  ventricular filling pressure.  4. Normal right ventricular size and function.  *** Compared with echocardiogram of 4/3/2022, no  significant changes noted.  ------------------------------------------------------------------------  Confirmed on  9/15/2022 - 16:03:54 by Zain Tucker M.D.  ------------------------------------------------------------------------  ---------------------------------------------------------------------------------------------------------------    MICROBIOLOGY:     Respiratory Viral Panel with COVID-19 by TANJA (22 @ 13:36)   Rapid RVP Result: [facility]   SARS-CoV-2: [facility]  This Respiratory Panel uses polymerase chain reaction (PCR) to detect for   adenovirus; coronavirus (HKU1, NL63, 229E, OC43); human metapneumovirus   (hMPV); human enterovirus/rhinovirus (Entero/RV); influenza A; influenza   A/H1; influenza A/H3; influenza A/H1-2009; influenza B; parainfluenza   viruses 1, 2, 3, 4; respiratory syncytial virus; Mycoplasma pneumoniae;   Chlamydophila pneumoniae; and SARS-CoV-2.     Urinalysis Basic - ( 20 Sep 2022 18:50 )    Color: Yellow / Appearance: Clear / S.019 / pH: x  Gluc: x / Ketone: Negative  / Bili: Negative / Urobili: Negative   Blood: x / Protein: Negative / Nitrite: Negative   Leuk Esterase: Negative / RBC: x / WBC x   Sq Epi: x / Non Sq Epi: x / Bacteria: x    RADIOLOGY:  [x] Chest radiographs reviewed and interpreted by me    EXAM:  CT CHEST                          PROCEDURE DATE:  2022      FINDINGS:    Lungs/Airways/Pleura: There is centrilobular and paraseptal emphysema.   There are retained secretions in the trachea and new extensive mucous   plugging in the left lower lobe. Stable tubular nodularity in the right   middle lobe and posterior right upper lobe. There is extensive   centrilobular and branching linear densities in the right middle and   right lower lobes, some of which are calcified. Ill-defined   calcifications in the left hepatic lobe. Decreased moderate left and   small right pleural effusions.    Mediastinum/Lymph nodes: No thoracic adenopathy.    Heart and Vessels: The heart is enlarged. No pericardial effusion. There   are coronary artery calcifications. The aorta is normal in caliber.   Dual-chamber pacer leads terminate in the right atrium and right   ventricle.    Upper Abdomen: Unremarkable.    Osseous structures and Soft Tissues: There is qualitative osteopenia. Old   rib fractures are unchanged.    IMPRESSION:    1.  Decreased moderate left and small right pleural effusions    2.  New mucous plugging in both lower lobes with retained secretions in   the trachea, question aspiration. Extensive small airways impaction in   the right middle and right lower lobes, some of which is calcified, may   reflect prior aspiration event.    3.  Stable tubular nodularity in the right upper lobe and right middle   lobe. Reassessment on follow-up chest CT scan is recommended in 4-6 weeks    4.  Emphysema    BRITTANY ESPINAL M.D., Attending Radiologist  This document hasbeen electronically signed. Sep 20 2022  5:06PM  ---------------------------------------------------------------------------------------------------------------  EXAM:  XR CHEST AP OR PA 1V                          PROCEDURE DATE:  09/15/2022      Frontal expiratory view of the chest shows the heart to be similar in   size. Left cardiac pacemaker is again noted.    The lungs show less right atelectasis with similar left effusion and   there is no evidence of pneumothorax nor right pleural effusion.    IMPRESSION:  No pneumothorax.    HORACIO HELMS MD; Attending Interventional Radiologist  This document has been electronically signed. Sep 16 2022 11:18AM  ---------------------------------------------------------------------------------------------------------------  EXAM:  XR CHEST PORTABLE URGENT 1V                          PROCEDURE DATE:  2022      FINDINGS:    Left chest wall pacemaker.  The heart size cannot be assessed on this projection.  Small left pleural effusion with associated atelectasis, mildly increased   since 2022. The right lung is clear. No pneumothorax.    IMPRESSION:  Small left pleural effusion mildly increased since 2022.    YAN DELGADO MD; Resident Radiology  This document has been electronically signed.  SATURNINO CHERRY MD; Attending Radiologist  This document has been electronically signed. Sep 14 2022  2:07PM    ---------------------------------------------------------------------------------------------------------------  EXAM:  [facility]                          PROCEDURE DATE:  2022      FINDINGS:    LUNGS AND AIRWAYS/PLEURA: Patent central airways.  Centrilobular   emphysema. Moderate left and small right pleural effusions with   associated partial right and complete left lower lobe compressive   atelectasis. There are patchy and nodular areas of consolidation   throughout the aerated portions of the right lung dependently, new since   May 2022 (i.e. 2:54 in the posterior right upper lobe). Other nodular   areas of consolidation in the posterior left upper lobe persist although   have improved since May 2022 (2:38).    MEDIASTINUM AND MARK: No lymphadenopathy.    VESSELS: Calcifications of the aorta and coronary arteries. No main,   right, left, lobar or segmental pulmonary embolus. Limited evaluation of   the subsegmental branches. And great vessels are normal in size.    HEART: Left chest wall dual chamber pacemaker with right atrial and right   ventricular leads. Heart size is normal. No pericardial effusion.    VISUALIZED UPPER ABDOMEN: Cholelithiasis.    BONES: Remote left posterior rib fractures.    IMPRESSION:  No pulmonary embolism.    Moderate left and small right pleural effusions, increased since May   2022, with worsening lower lobe compressive atelectasis.    New right lung patchy and nodular areas of consolidation, likely   infectious/inflammatory.    JAQUAN LARA MD; Resident Radiologist  This document has been electronically signed.  MELANY MCNAIR M.D., Attending Radiologist  This document has been electronically signed. Sep 14 2022  5:06PM  ---------------------------------------------------------------------------------------------------------------    
[facility]  DATE: 9/15/2022    PROCEDURE: THORACENTESIS    INDICATION: PLEURAL EFFUSION                    [ ] DIAGNOSTIC                    [x] THERAPEUTIC                            [x] CHF                                    [ ] r/o neoplasm                   [ ] r/o empyema                    [ ] r/o hemothorax      PRE-PROCEDURE  Informed consent was obtained after the risks and benefits of the procedure were explained. Risks described included but were not limited to bleeding, cough, pneumothorax (potentially requiring chest tube placement), vaso-vagal reactions, chest pain and death.    A TIME OUT was performed prior to the procedure with verbal confirmation of correct patient identity, correct site, availability of necessary equipment, and accuracy of the consent form. Safety precautions based on the patient's history and medication use have been addressed.    The patient was placed in a sitting position at the edge of the bed with arms resting on a table. After localization of the pleural fluid using ultrasound guidance, the posterior     [x] left    [ ] right      chest wall was prepped and draped with strict adherence to aseptic technique. Xylocaine solution was used for local anesthesia. A thoracentesis catheter was then advanced into the pleural space.       Approximately   1000 cc  of                [ ] clear yellow          [x] straw colored          [ ] serosanguinous          [ ] blood-tinged          [ ] bloody          [ ] purulent          [ ] chylous          [ ] cloudy     fluid was removed.    The patient tolerated the procedure well and there were no immediate complications. Post-procedure vital signs were stable.    [ ] Specimens were sent for cytology, AFB smear and culture, fungal culture, routine culture, gram stain, cell count, pH and chemistry.    [x] Specimens were not sent.    [x] A post-procedure CXR to r/o PTX has been ordered and will be checked.        Sarabjit Wright MD, [facility]  [phone]            
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 making sleep difficult; 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; out of bed and into the chair using a chelsy lift again yesterday    REVIEW OF SYSTEMS:  Constitutional: As per interval history  HEENT: Within normal limits  CV: As per interval history  Resp: As per interval history  GI: dysphagia  : Within normal limits  Musculoskeletal: Within normal limits  Skin: Within normal limits  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 - left facial droop - dysphagia   Psychiatric: Within normal limits  Endocrine: diabetes  Hematologic/Lymphatic: Within normal limits  Allergic/Immunologic: Within normal limits    MEDICATIONS:     Pulmonary "  albuterol/ipratropium for Nebulization 3 milliLiter(s) Nebulizer every 6 hours  buDESOnide    Inhalation Suspension 0.5 milliGRAM(s) Inhalation two times a day  guaifenesin/dextromethorphan Oral Liquid 10 milliLiter(s) Oral every 6 hours  montelukast 10 milliGRAM(s) Oral at bedtime    Anti-microbials:  doxycycline IVPB      doxycycline IVPB 100 milliGRAM(s) IV Intermittent every 12 hours  piperacillin/tazobactam IVPB.. 3.375 Gram(s) IV Intermittent every 8 hours    Cardiovascular:  furosemide   Injectable 40 milliGRAM(s) IV Push every 12 hours  metoprolol tartrate 50 milliGRAM(s) Oral two times a day  sacubitril 24 mG/valsartan 26 mG 1 Tablet(s) Oral two times a day  tamsulosin 0.8 milliGRAM(s) Oral at bedtime    Other:  aspirin enteric coated 81 milliGRAM(s) Oral daily  atorvastatin 80 milliGRAM(s) Oral at bedtime  cadexomer iodine 0.9% Gel 1 Application(s) Topical daily  dextrose 5%. 1000 milliLiter(s) IV Continuous <Continuous>  dextrose 5%. 1000 milliLiter(s) IV Continuous <Continuous>  dextrose 50% Injectable 25 Gram(s) IV Push once  dextrose 50% Injectable 12.5 Gram(s) IV Push once  dextrose 50% Injectable 25 Gram(s) IV Push once  finasteride 5 milliGRAM(s) Oral daily  gabapentin 100 milliGRAM(s) Oral two times a day  glucagon  Injectable 1 milliGRAM(s) IntraMuscular once  insulin glargine Injectable (LANTUS) 26 Unit(s) SubCutaneous at bedtime  insulin lispro (ADMELOG) corrective regimen sliding scale   SubCutaneous three times a day before meals  insulin lispro (ADMELOG) corrective regimen sliding scale   SubCutaneous at bedtime  insulin lispro Injectable (ADMELOG) 20 Unit(s) SubCutaneous three times a day before meals  levothyroxine 25 MICROGram(s) Oral daily  multivitamin/minerals 1 Tablet(s) Oral daily  pantoprazole    Tablet 40 milliGRAM(s) Oral before breakfast  polyethylene glycol 3350 17 Gram(s) Oral daily  predniSONE   Tablet 50 milliGRAM(s) Oral daily  senna 2 Tablet(s) Oral at bedtime    MEDICATIONS  (PRN):  acetaminophen     Tablet .. 650 milliGRAM(s) Oral every 6 hours PRN Moderate Pain (4 - 6)  dextrose Oral Gel 15 Gram(s) Oral once PRN Blood Glucose LESS THAN 70 milliGRAM(s)/deciliter    OBJECTIVE:    POCT Blood Glucose.: 102 mg/dL (20 Sep 2022 21:55)  POCT Blood Glucose.: 130 mg/dL (20 Sep 2022 17:29)  POCT Blood Glucose.: 101 mg/dL (20 Sep 2022 12:58)  POCT Blood Glucose.: 73 mg/dL (20 Sep 2022 09:04)      PHYSICAL EXAM:       ICU Vital Signs Last 24 Hrs  T(C): 36.3 (21 Sep 2022 04:47), Max: 36.4 (20 Sep 2022 11:45)  T(F): 97.3 (21 Sep 2022 04:47), Max: 97.6 (20 Sep 2022 11:45)  HR: 60 (21 Sep 2022 04:47) (58 - 60)  BP: 117/63 (21 Sep 2022 04:47) (106/53 - 122/62)  BP(mean): --  ABP: --  ABP(mean): --  RR: 18 (21 Sep 2022 04:47) (18 - 18)  SpO2: 98% (21 Sep 2022 04:47) (96% - 98%) on a 2lpm nasal canula    General: Awake. Alert. Cooperative. No distress. Appears stated age. Obese.   HEENT: Atraumatic. Normocephalic. Anicteric. Normal oral mucosa. PERRL. EOMI.  Neck: Supple. Trachea midline. Thyroid without enlargement/tenderness/nodules. No carotid bruit. No JVD.	  Cardiovascular: Regular rate and rhythm. Distant S1 S2. No murmurs, rubs or gallops.  Respiratory: Respirations unlabored. Decreased bilateral rhonchi and wheeze. Improved breath sounds left hemithorax. Decreased breath sounds at the bases with dullness to percussion. No curvature.  Abdomen: Soft. Non-tender. Non-distended. No organomegaly. No masses. Normal bowel sounds. Adair catheter  Extremities: R BKA stump with healing surgical wound  Pulses: Decreased peripheral pulses LLE  Skin: Venous stasis changes left lower extremity. Abrasions on the left heel.   Lymph Nodes: Cervical, supraclavicular and axillary nodes normal  Neurological: Left sided weakness left > arm. Left facial droop. A and O x 3  Psychiatry: Appropriate mood and affect.    LABS:                          9.8    10.57 )-----------( 324      ( 21 Sep 2022 07:36 )             34.5     CBC    WBC  10.57 <==, 7.73 <==, 9.40 <==    Hemoglobin  9.8 <<==, 8.5 <<==, 7.7 <<==    Hematocrit  34.5 <==, 30.1 <==, 27.5 <==    Platelets  324 <==, 293 <==, 293 <==      145  |  105  |  63<H>  ----------------------------<  41<LL>    09-20  3.4<L>   |  27  |  1.48<H>      LYTES    sodium  145 <==, 143 <==, 142 <==, 138 <==, 140 <==, 143 <==    potassium   3.4 <==, 3.4 <==, 3.1 <==, 3.5 <==, 4.1 <==, 4.0 <==    chloride  105 <==, 102 <==, 103 <==, 100 <==, 102 <==, 104 <==    carbon dioxide  27 <==, 29 <==, 25 <==, 29 <==, 27 <==, 31 <==    =============================================================================================  RENAL FUNCTION:    Creatinine:   1.48  <<==, 1.44  <<==, 1.56  <<==, 1.76  <<==, 0.95  <<==, 0.89  <<==    BUN:   63 <==, 56 <==, 56 <==, 49 <==, 21 <==, 22 <==    ============================================================================================    calcium   8.5 <==, 8.7 <==, 8.6 <==, 8.3 <==, 8.7 <==, 8.2 <==    phos   3.4 <==    mag   1.8 <==, 1.8 <==, 1.9 <==, 1.8 <==    ============================================================================================  LFTs    AST:   12 <==     ALT:  11  <==     AP:  79  <=    Bili:  0.3  <=    PT/INR - ( 21 Sep 2022 07:38 )   PT: 37.8 sec;   INR: 3.22 ratio       PTT - ( 21 Sep 2022 07:38 )  PTT:35.4 sec    Venous Blood Gas:  09-14 @ 13:10  7.38/57/31/34/45.9  VBG Lactate: 1.2    Serum Pro-Brain Natriuretic Peptide: 2388 pg/mL (09-14 @ 13:36)    CARDIAC MARKERS ( 15 Sep 2022 07:22 )  CPK x     /CKMB x     /CKMB Units x        troponin 165 ng/L    CARDIAC MARKERS ( 14 Sep 2022 16:27 )  CPK x     /CKMB x     /CKMB Units x        troponin 145 ng/L    CARDIAC MARKERS ( 14 Sep 2022 13:36 )  CPK x     /CKMB x     /CKMB Units x        troponin 154 ng/L    < from: TTE with Doppler (w/Cont) (09.15.22 @ 12:59) >    Patient name: Martir Heart  YOB: 1935   Age: 87 (M)   MR#: [id number]  Study Date: 9/15/2022  ------------------------------------------------------------------------  Dimensions:    Normal Values:  LA:     3.5    2.0 - 4.0 cm  Ao:     3.6    2.0 - 3.8 cm  SEPTUM: 1.0    0.6 - 1.2 cm  PWT:    1.0    0.6 - 1.1 cm  LVIDd:  4.3    3.0 - 5.6 cm  LVIDs:  3.4    1.8 - 4.0 cm  Derived variables:  LVMI: 78 g/m2  RWT: 0.50  Fractional short: 21 %  EF (Visual Estimate): 35-40 %  Doppler Peak Velocity (m/sec): AoV=1.6  ---------------------------------------------------------------------------------------------------------------  Conclusions:  1. Increased relative wall thickness with normal left  ventricular mass index, consistent with concentric left  ventricular remodeling.  2. Endocardial visualization enhanced with intravenous  injection of Ultrasonic Enhancing Agent (Lumason). Severe  segmental left ventricular systolic dysfunction. The mid to  distal anteroseptum and severely hypokinetic. The mid to  basal inferolateral wall is hypokinetic.  3. Increased E/e'  is consistent with elevated left  ventricular filling pressure.  4. Normal right ventricular size and function.  *** Compared with echocardiogram of 4/3/2022, no  significant changes noted.  ------------------------------------------------------------------------  Confirmed on  9/15/2022 - 16:03:54 by Zain Tucker M.D.  ------------------------------------------------------------------------  ---------------------------------------------------------------------------------------------------------------    MICROBIOLOGY:     Respiratory Viral Panel with COVID-19 by TANJA (09.14.22 @ 13:36)   Rapid RVP Result: [facility]   SARS-CoV-2: [facility]  This Respiratory Panel uses polymerase chain reaction (PCR) to detect for   adenovirus; coronavirus (HKU1, NL63, 229E, OC43); human metapneumovirus   (hMPV); human enterovirus/rhinovirus (Entero/RV); influenza A; influenza   A/H1; influenza A/H3; influenza A/H1-2009; influenza B; parainfluenza   viruses 1, 2, 3, 4; respiratory syncytial virus; Mycoplasma pneumoniae;   Chlamydophila pneumoniae; and SARS-CoV-2.       RADIOLOGY:  [x] Chest radiographs reviewed and interpreted by me    EXAM:  CT CHEST                          PROCEDURE DATE:  09/20/2022      FINDINGS:    Lungs/Airways/Pleura: There is centrilobular and paraseptal emphysema.   There are retained secretions in the trachea and new extensive mucous   plugging in the left lower lobe. Stable tubular nodularity in the right   middle lobe and posterior right upper lobe. There is extensive   centrilobular and branching linear densities in the right middle and   right lower lobes, some of which are calcified. Ill-defined   calcifications in the left hepatic lobe. Decreased moderate left and   small right pleural effusions.    Mediastinum/Lymph nodes: No thoracic adenopathy.    Heart and Vessels: The heart is enlarged. No pericardial effusion. There   are coronary artery calcifications. The aorta is normal in caliber.   Dual-chamber pacer leads terminate in the right atrium and right   ventricle.    Upper Abdomen: Unremarkable.    Osseous structures and Soft Tissues: There is qualitative osteopenia. Old   rib fractures are unchanged.    IMPRESSION:    1.  Decreased moderate left and small right pleural effusions    2.  New mucous plugging in both lower lobes with retained secretions in   the trachea, question aspiration. Extensive small airways impaction in   the right middle and right lower lobes, some of which is calcified, may   reflect prior aspiration event.    3.  Stable tubular nodularity in the right upper lobe and right middle   lobe. Reassessment on follow-up chest CT scan is recommended in 4-6 weeks    4.  Emphysema    BRITTANY ESPINAL M.D., Attending Radiologist  This document hasbeen electronically signed. Sep 20 2022  5:06PM  ---------------------------------------------------------------------------------------------------------------  EXAM:  XR CHEST AP OR PA 1V                          PROCEDURE DATE:  09/15/2022      Frontal expiratory view of the chest shows the heart to be similar in   size. Left cardiac pacemaker is again noted.    The lungs show less right atelectasis with similar left effusion and   there is no evidence of pneumothorax nor right pleural effusion.    IMPRESSION:  No pneumothorax.    HORACIO HELMS MD; Attending Interventional Radiologist  This document has been electronically signed. Sep 16 2022 11:18AM  ---------------------------------------------------------------------------------------------------------------  EXAM:  XR CHEST PORTABLE URGENT 1V                          PROCEDURE DATE:  09/14/2022      FINDINGS:    Left chest wall pacemaker.  The heart size cannot be assessed on this projection.  Small left pleural effusion with associated atelectasis, mildly increased   since 4/18/2022. The right lung is clear. No pneumothorax.    IMPRESSION:  Small left pleural effusion mildly increased since 4/18/2022.    YAN DELGADO MD; Resident Radiology  This document has been electronically signed.  SATURNINO CHERRY MD; Attending Radiologist  This document has been electronically signed. Sep 14 2022  2:07PM    ---------------------------------------------------------------------------------------------------------------  EXAM:  CT ANGIO CHEST PULM ABHIJEET MILLER                          PROCEDURE DATE:  09/14/2022      FINDINGS:    LUNGS AND AIRWAYS/PLEURA: Patent central airways.  Centrilobular   emphysema. Moderate left and small right pleural effusions with   associated partial right and complete left lower lobe compressive   atelectasis. There are patchy and nodular areas of consolidation   throughout the aerated portions of the right lung dependently, new since   May 2022 (i.e. 2:54 in the posterior right upper lobe). Other nodular   areas of consolidation in the posterior left upper lobe persist although   have improved since May 2022 (2:38).    MEDIASTINUM AND MARK: No lymphadenopathy.    VESSELS: Calcifications of the aorta and coronary arteries. No main,   right, left, lobar or segmental pulmonary embolus. Limited evaluation of   the subsegmental branches. And great vessels are normal in size.    HEART: Left chest wall dual chamber pacemaker with right atrial and right   ventricular leads. Heart size is normal. No pericardial effusion.    VISUALIZED UPPER ABDOMEN: Cholelithiasis.    BONES: Remote left posterior rib fractures.    IMPRESSION:  No pulmonary embolism.    Moderate left and small right pleural effusions, increased since May   2022, with worsening lower lobe compressive atelectasis.    New right lung patchy and nodular areas of consolidation, likely   infectious/inflammatory.    JAQUAN LARA MD; Resident Radiologist  This document has been electronically signed.  MELANY MCNAIR M.D., Attending Radiologist  This document has been electronically signed. Sep 14 2022  5:06PM  ---------------------------------------------------------------------------------------------------------------      
Patient is a 87y old  Male who presents with a chief complaint of acute rayshawn chronic diastolic CHF exacerbation, Pneumonia (15 Sep 2022 10:08)      SUBJECTIVE / OVERNIGHT EVENTS: ptn is s/p R therapeutic thoracentesis: 1 liter removed , ptn feels better, denies dyspnea at present    MEDICATIONS  (STANDING):  albuterol/ipratropium for Nebulization 3 milliLiter(s) Nebulizer every 6 hours  aspirin enteric coated 81 milliGRAM(s) Oral daily  atorvastatin 80 milliGRAM(s) Oral at bedtime  buDESOnide    Inhalation Suspension 0.5 milliGRAM(s) Inhalation two times a day  dextrose 5%. 1000 milliLiter(s) (100 mL/Hr) IV Continuous <Continuous>  dextrose 5%. 1000 milliLiter(s) (50 mL/Hr) IV Continuous <Continuous>  dextrose 50% Injectable 25 Gram(s) IV Push once  dextrose 50% Injectable 12.5 Gram(s) IV Push once  dextrose 50% Injectable 25 Gram(s) IV Push once  doxycycline IVPB      doxycycline IVPB 100 milliGRAM(s) IV Intermittent every 12 hours  finasteride 5 milliGRAM(s) Oral daily  furosemide   Injectable 40 milliGRAM(s) IV Push every 12 hours  gabapentin 100 milliGRAM(s) Oral two times a day  glucagon  Injectable 1 milliGRAM(s) IntraMuscular once  guaifenesin/dextromethorphan Oral Liquid 10 milliLiter(s) Oral every 6 hours  insulin glargine Injectable (LANTUS) 24 Unit(s) SubCutaneous at bedtime  insulin lispro (ADMELOG) corrective regimen sliding scale   SubCutaneous three times a day before meals  insulin lispro Injectable (ADMELOG) 7 Unit(s) SubCutaneous three times a day before meals  levothyroxine 25 MICROGram(s) Oral daily  losartan 100 milliGRAM(s) Oral daily  methylPREDNISolone sodium succinate Injectable 20 milliGRAM(s) IV Push every 6 hours  metoprolol tartrate 25 milliGRAM(s) Oral two times a day  montelukast 10 milliGRAM(s) Oral at bedtime  multivitamin/minerals 1 Tablet(s) Oral daily  pantoprazole    Tablet 40 milliGRAM(s) Oral before breakfast  piperacillin/tazobactam IVPB.. 3.375 Gram(s) IV Intermittent every 8 hours  polyethylene glycol 3350 17 Gram(s) Oral daily  senna 2 Tablet(s) Oral at bedtime  tamsulosin 0.8 milliGRAM(s) Oral at bedtime  warfarin 5 milliGRAM(s) Oral at bedtime    MEDICATIONS  (PRN):  dextrose Oral Gel 15 Gram(s) Oral once PRN Blood Glucose LESS THAN 70 milliGRAM(s)/deciliter      Vital Signs Last 24 Hrs  T(F): 98 (09-15-22 @ 12:39), Max: 98 (09-15-22 @ 05:00)  HR: 80 (09-15-22 @ 12:39) (66 - 81)  BP: 130/59 (09-15-22 @ 12:39) (117/73 - 134/62)  RR: 22 (09-15-22 @ 12:39) (20 - 22)  SpO2: 97% (09-15-22 @ 12:39) (88% - 97%)  Telemetry:   CAPILLARY BLOOD GLUCOSE      POCT Blood Glucose.: 308 mg/dL (15 Sep 2022 13:05)  POCT Blood Glucose.: 262 mg/dL (15 Sep 2022 09:01)  POCT Blood Glucose.: 180 mg/dL (14 Sep 2022 22:12)    I&O's Summary    15 Sep 2022 07:01  -  15 Sep 2022 16:58  --------------------------------------------------------  IN: 0 mL / OUT: 800 mL / NET: -800 mL        PHYSICAL EXAM:  GENERAL: NAD, well-developed  HEAD:  Atraumatic, Normocephalic  EYES: EOMI, PERRLA, conjunctiva and sclera clear  NECK: Supple, No JVD  CHEST/LUNG: Clear to auscultation bilaterally; No wheeze  HEART: Regular rate and rhythm; No murmurs, rubs, or gallops  ABDOMEN: Soft, Nontender, Nondistended; Bowel sounds present  EXTREMITIES:  2+ Peripheral Pulses, No clubbing, cyanosis, or edema  PSYCH: AAOx3  NEUROLOGY: non-focal  SKIN: No rashes or lesions    LABS:                        8.5    7.73  )-----------( 293      ( 15 Sep 2022 07:22 )             30.1     09-15    140  |  102  |  21  ----------------------------<  190<H>  4.1   |  27  |  0.95    Ca    8.7      15 Sep 2022 07:22  Mg     1.8     09-14    TPro  6.0  /  Alb  2.6<L>  /  TBili  0.3  /  DBili  x   /  AST  12  /  ALT  11  /  AlkPhos  79  09-14    PT/INR - ( 15 Sep 2022 10:11 )   PT: 24.8 sec;   INR: 2.12 ratio         PTT - ( 14 Sep 2022 13:36 )  PTT:35.0 sec          RADIOLOGY & ADDITIONAL TESTS:    Imaging Personally Reviewed:    Consultant(s) Notes Reviewed:      Care Discussed with Consultants/Other Providers:  
Patient is a 87y old  Male who presents with a chief complaint of acute rayshawn chronic diastolic CHF exacerbation, Pneumonia (19 Sep 2022 10:29)      SUBJECTIVE / OVERNIGHT EVENTS: no new c/o, remains on IV steroids    MEDICATIONS  (STANDING):  albuterol/ipratropium for Nebulization 3 milliLiter(s) Nebulizer every 6 hours  aspirin enteric coated 81 milliGRAM(s) Oral daily  atorvastatin 80 milliGRAM(s) Oral at bedtime  buDESOnide    Inhalation Suspension 0.5 milliGRAM(s) Inhalation two times a day  cadexomer iodine 0.9% Gel 1 Application(s) Topical daily  dextrose 5%. 1000 milliLiter(s) (100 mL/Hr) IV Continuous <Continuous>  dextrose 5%. 1000 milliLiter(s) (50 mL/Hr) IV Continuous <Continuous>  dextrose 50% Injectable 25 Gram(s) IV Push once  dextrose 50% Injectable 12.5 Gram(s) IV Push once  dextrose 50% Injectable 25 Gram(s) IV Push once  doxycycline IVPB 100 milliGRAM(s) IV Intermittent every 12 hours  doxycycline IVPB      finasteride 5 milliGRAM(s) Oral daily  furosemide   Injectable 40 milliGRAM(s) IV Push every 12 hours  gabapentin 100 milliGRAM(s) Oral two times a day  glucagon  Injectable 1 milliGRAM(s) IntraMuscular once  guaifenesin/dextromethorphan Oral Liquid 10 milliLiter(s) Oral every 6 hours  insulin glargine Injectable (LANTUS) 26 Unit(s) SubCutaneous at bedtime  insulin lispro (ADMELOG) corrective regimen sliding scale   SubCutaneous three times a day before meals  insulin lispro (ADMELOG) corrective regimen sliding scale   SubCutaneous at bedtime  insulin lispro Injectable (ADMELOG) 24 Unit(s) SubCutaneous three times a day before meals  levothyroxine 25 MICROGram(s) Oral daily  methylPREDNISolone sodium succinate Injectable 20 milliGRAM(s) IV Push every 6 hours  metoprolol tartrate 50 milliGRAM(s) Oral two times a day  montelukast 10 milliGRAM(s) Oral at bedtime  multivitamin/minerals 1 Tablet(s) Oral daily  pantoprazole    Tablet 40 milliGRAM(s) Oral before breakfast  piperacillin/tazobactam IVPB.. 3.375 Gram(s) IV Intermittent every 8 hours  polyethylene glycol 3350 17 Gram(s) Oral daily  sacubitril 24 mG/valsartan 26 mG 1 Tablet(s) Oral two times a day  senna 2 Tablet(s) Oral at bedtime  tamsulosin 0.8 milliGRAM(s) Oral at bedtime    MEDICATIONS  (PRN):  acetaminophen     Tablet .. 650 milliGRAM(s) Oral every 6 hours PRN Moderate Pain (4 - 6)  dextrose Oral Gel 15 Gram(s) Oral once PRN Blood Glucose LESS THAN 70 milliGRAM(s)/deciliter      Vital Signs Last 24 Hrs  T(F): 97.6 (09-19-22 @ 04:32), Max: 97.6 (09-18-22 @ 22:11)  HR: 69 (09-19-22 @ 04:32) (62 - 69)  BP: 135/72 (09-19-22 @ 04:32) (124/60 - 148/68)  RR: 18 (09-19-22 @ 04:32) (18 - 18)  SpO2: 97% (09-19-22 @ 04:32) (96% - 97%)  Telemetry:   CAPILLARY BLOOD GLUCOSE      POCT Blood Glucose.: 215 mg/dL (19 Sep 2022 08:36)  POCT Blood Glucose.: 212 mg/dL (18 Sep 2022 22:09)  POCT Blood Glucose.: 176 mg/dL (18 Sep 2022 17:44)  POCT Blood Glucose.: 343 mg/dL (18 Sep 2022 13:26)    I&O's Summary    18 Sep 2022 07:01  -  19 Sep 2022 07:00  --------------------------------------------------------  IN: 0 mL / OUT: 2700 mL / NET: -2700 mL        PHYSICAL EXAM:  GENERAL: NAD, well-developed  HEAD:  Atraumatic, Normocephalic  EYES: EOMI, PERRLA, conjunctiva and sclera clear  NECK: Supple, No JVD  CHEST/LUNG: Clear to auscultation bilaterally; No wheeze  HEART: Regular rate and rhythm; No murmurs, rubs, or gallops  ABDOMEN: Soft, Nontender, Nondistended; Bowel sounds present  EXTREMITIES:  2+ Peripheral Pulses, No clubbing, cyanosis, or edema  PSYCH: AAOx3  NEUROLOGY: non-focal  SKIN: No rashes or lesions    LABS:    09-19    143  |  102  |  56<H>  ----------------------------<  218<H>  3.4<L>   |  29  |  1.44<H>    Ca    8.7      19 Sep 2022 07:11  Phos  3.4     09-18  Mg     1.8     09-19      PT/INR - ( 19 Sep 2022 07:22 )   PT: 42.8 sec;   INR: 3.67 ratio         PTT - ( 19 Sep 2022 07:22 )  PTT:31.0 sec          RADIOLOGY & ADDITIONAL TESTS:    Imaging Personally Reviewed:    Consultant(s) Notes Reviewed:      Care Discussed with Consultants/Other Providers:  
CARDIOLOGY FOLLOW UP - Dr. Stephens  Date of Service: 9/18/22  CC: tele events noted    Review of Systems:  Constitutional: No fever, weight loss, or fatigue  Respiratory: No cough, wheezing, or hemoptysis, no shortness of breath  Cardiovascular: No chest pain, palpitations, passing out, dizziness, or leg swelling  Gastrointestinal: No abd or epigastric pain. No nausea, vomiting, or hematemesis; no diarrhea or consiptaiton, no melena or hematochezia  Vascular: No edema     TELEMETRY:    PHYSICAL EXAM:  T(C): 36.3 (09-18-22 @ 04:02), Max: 36.4 (09-17-22 @ 21:19)  HR: 69 (09-18-22 @ 04:02) (68 - 94)  BP: 146/67 (09-18-22 @ 04:02) (120/64 - 146/67)  RR: 18 (09-18-22 @ 04:02) (18 - 18)  SpO2: 99% (09-18-22 @ 04:02) (93% - 100%)  Wt(kg): --  I&O's Summary    17 Sep 2022 07:01  -  18 Sep 2022 07:00  --------------------------------------------------------  IN: 360 mL / OUT: 3150 mL / NET: -2790 mL        Appearance: Normal	  Cardiovascular: Normal S1 S2,RRR, No JVD, No murmurs  Respiratory: Lungs clear to auscultation	  Gastrointestinal:  Soft, Non-tender, + BS	  Extremities: Normal range of motion, No clubbing, cyanosis or edema  Vascular: Peripheral pulses palpable 2+ bilaterally       Home Medications:  albuterol 2.5 mg/3 mL (0.083%) inhalation solution: 3 milliliter(s) inhaled every 6 hours (14 Sep 2022 19:13)  aspirin 81 mg oral delayed release tablet: 1 tab(s) orally once a day (14 Sep 2022 19:13)  bacitracin 500 units/g topical ointment: Apply topically to affected area 2 times a day (14 Sep 2022 19:13)  budesonide 0.5 mg/2 mL inhalation suspension: 0.5 milligram(s) inhaled 2 times a day (14 Sep 2022 19:13)  Coumadin 5 mg oral tablet: 1 tab(s) orally once a day (at bedtime) (14 Sep 2022 19:13)  Crestor 10 mg oral tablet: 1 tab(s) orally once a day (at bedtime) (14 Sep 2022 19:13)  finasteride 5 mg oral tablet: 1 tab(s) orally once a day (14 Sep 2022 19:13)  furosemide 20 mg oral tablet: 1 tab(s) orally once a day (14 Sep 2022 19:13)  gabapentin 100 mg oral tablet: 1 tab(s) orally 2 times a day (14 Sep 2022 19:13)  glycopyrrolate 1 mg oral tablet: 1 tab(s) orally 2 times a day (14 Sep 2022 19:13)  insulin lispro 100 units/mL injectable solution: 3 unit(s) injectable 3 times a day (before meals) (14 Sep 2022 19:13)  ipratropium 500 mcg/2.5 mL inhalation solution: 2.5 milliliter(s) inhaled 4 times a day (14 Sep 2022 19:13)  Levemir FlexTouch 100 units/mL subcutaneous solution: 30 unit(s) subcutaneous once a day (at bedtime) (14 Sep 2022 19:13)  losartan 100 mg oral tablet: 1 tab(s) orally once a day (14 Sep 2022 19:13)  metoprolol succinate 25 mg oral tablet, extended release: 1 tab(s) orally once a day (14 Sep 2022 19:13)  montelukast 10 mg oral tablet: 1 tab(s) orally once a day (14 Sep 2022 19:13)  Multiple Vitamins with Minerals oral tablet: 1 tab(s) orally once a day (14 Sep 2022 19:13)  pantoprazole 40 mg oral delayed release tablet: 1 tab(s) orally once a day (14 Sep 2022 19:13)  polyethylene glycol 3350 oral powder for reconstitution: 17 gram(s) orally once a day (14 Sep 2022 19:13)  Santyl 250 units/g topical ointment: Apply topically to affected area 2 times a day (14 Sep 2022 19:13)  senna oral tablet: 2 tab(s) orally once a day (at bedtime) (14 Sep 2022 19:13)  Synthroid 25 mcg (0.025 mg) oral tablet: 1 tab(s) orally once a day (14 Sep 2022 19:13)  tamsulosin 0.4 mg oral capsule: 2 cap(s) orally once a day (at bedtime) (14 Sep 2022 19:13)        MEDICATIONS  (STANDING):  albuterol/ipratropium for Nebulization 3 milliLiter(s) Nebulizer every 6 hours  aspirin enteric coated 81 milliGRAM(s) Oral daily  atorvastatin 80 milliGRAM(s) Oral at bedtime  buDESOnide    Inhalation Suspension 0.5 milliGRAM(s) Inhalation two times a day  cadexomer iodine 0.9% Gel 1 Application(s) Topical daily  dextrose 5%. 1000 milliLiter(s) (100 mL/Hr) IV Continuous <Continuous>  dextrose 5%. 1000 milliLiter(s) (50 mL/Hr) IV Continuous <Continuous>  dextrose 50% Injectable 25 Gram(s) IV Push once  dextrose 50% Injectable 12.5 Gram(s) IV Push once  dextrose 50% Injectable 25 Gram(s) IV Push once  doxycycline IVPB      doxycycline IVPB 100 milliGRAM(s) IV Intermittent every 12 hours  finasteride 5 milliGRAM(s) Oral daily  furosemide   Injectable 40 milliGRAM(s) IV Push every 12 hours  gabapentin 100 milliGRAM(s) Oral two times a day  glucagon  Injectable 1 milliGRAM(s) IntraMuscular once  guaifenesin/dextromethorphan Oral Liquid 10 milliLiter(s) Oral every 6 hours  insulin glargine Injectable (LANTUS) 26 Unit(s) SubCutaneous at bedtime  insulin lispro (ADMELOG) corrective regimen sliding scale   SubCutaneous three times a day before meals  insulin lispro (ADMELOG) corrective regimen sliding scale   SubCutaneous at bedtime  insulin lispro Injectable (ADMELOG) 13 Unit(s) SubCutaneous three times a day before meals  levothyroxine 25 MICROGram(s) Oral daily  methylPREDNISolone sodium succinate Injectable 20 milliGRAM(s) IV Push every 6 hours  metoprolol tartrate 25 milliGRAM(s) Oral two times a day  montelukast 10 milliGRAM(s) Oral at bedtime  multivitamin/minerals 1 Tablet(s) Oral daily  pantoprazole    Tablet 40 milliGRAM(s) Oral before breakfast  piperacillin/tazobactam IVPB.. 3.375 Gram(s) IV Intermittent every 8 hours  polyethylene glycol 3350 17 Gram(s) Oral daily  sacubitril 24 mG/valsartan 26 mG 1 Tablet(s) Oral two times a day  senna 2 Tablet(s) Oral at bedtime  tamsulosin 0.8 milliGRAM(s) Oral at bedtime        EKG:  RADIOLOGY:  DIAGNOSTIC TESTING:  [ ] Echocardiogram:  [ ] Catherterization:  [ ] Stress Test:  OTHER:     LABS:	 	      09-18    142  |  103  |  56<H>  ----------------------------<  193<H>  3.1<L>   |  25  |  1.56<H>    Ca    8.6      18 Sep 2022 06:52  Phos  3.4     09-18  Mg     1.8     09-18        PT/INR - ( 18 Sep 2022 06:51 )   PT: 28.4 sec;   INR: 2.43 ratio         PTT - ( 18 Sep 2022 06:51 )  PTT:30.4 sec    CARDIAC MARKERS:                  
CARDIOLOGY FOLLOW UP - Dr. Stephens  Date of Service: 9/22/22  CC: no events    Review of Systems:  Constitutional: No fever, weight loss, or fatigue  Respiratory: No cough, wheezing, or hemoptysis, no shortness of breath  Cardiovascular: No chest pain, palpitations, passing out, dizziness, or leg swelling  Gastrointestinal: No abd or epigastric pain. No nausea, vomiting, or hematemesis; no diarrhea or consiptaiton, no melena or hematochezia  Vascular: No edema     TELEMETRY:    PHYSICAL EXAM:  T(C): 36.6 (09-22-22 @ 04:14), Max: 36.6 (09-22-22 @ 04:14)  HR: 60 (09-22-22 @ 04:14) (60 - 67)  BP: 107/66 (09-22-22 @ 04:14) (92/50 - 113/64)  RR: 18 (09-22-22 @ 04:14) (18 - 18)  SpO2: 96% (09-22-22 @ 04:14) (94% - 96%)  Wt(kg): --  I&O's Summary    21 Sep 2022 07:01  -  22 Sep 2022 07:00  --------------------------------------------------------  IN: 410 mL / OUT: 1100 mL / NET: -690 mL        Appearance: Normal	  Cardiovascular: Normal S1 S2,RRR, No JVD, No murmurs  Respiratory: Lungs clear to auscultation	  Gastrointestinal:  Soft, Non-tender, + BS	  Extremities: Normal range of motion, No clubbing, cyanosis or edema  Vascular: Peripheral pulses palpable 2+ bilaterally       Home Medications:  albuterol 2.5 mg/3 mL (0.083%) inhalation solution: 3 milliliter(s) inhaled every 6 hours (14 Sep 2022 19:13)  aspirin 81 mg oral delayed release tablet: 1 tab(s) orally once a day (14 Sep 2022 19:13)  bacitracin 500 units/g topical ointment: Apply topically to affected area 2 times a day (14 Sep 2022 19:13)  budesonide 0.5 mg/2 mL inhalation suspension: 0.5 milligram(s) inhaled 2 times a day (14 Sep 2022 19:13)  Coumadin 5 mg oral tablet: 1 tab(s) orally once a day (at bedtime) (14 Sep 2022 19:13)  Crestor 10 mg oral tablet: 1 tab(s) orally once a day (at bedtime) (14 Sep 2022 19:13)  finasteride 5 mg oral tablet: 1 tab(s) orally once a day (14 Sep 2022 19:13)  furosemide 20 mg oral tablet: 1 tab(s) orally once a day (14 Sep 2022 19:13)  gabapentin 100 mg oral tablet: 1 tab(s) orally 2 times a day (14 Sep 2022 19:13)  glycopyrrolate 1 mg oral tablet: 1 tab(s) orally 2 times a day (14 Sep 2022 19:13)  insulin lispro 100 units/mL injectable solution: 3 unit(s) injectable 3 times a day (before meals) (14 Sep 2022 19:13)  ipratropium 500 mcg/2.5 mL inhalation solution: 2.5 milliliter(s) inhaled 4 times a day (14 Sep 2022 19:13)  Levemir FlexTouch 100 units/mL subcutaneous solution: 30 unit(s) subcutaneous once a day (at bedtime) (14 Sep 2022 19:13)  losartan 100 mg oral tablet: 1 tab(s) orally once a day (14 Sep 2022 19:13)  metoprolol succinate 25 mg oral tablet, extended release: 1 tab(s) orally once a day (14 Sep 2022 19:13)  montelukast 10 mg oral tablet: 1 tab(s) orally once a day (14 Sep 2022 19:13)  Multiple Vitamins with Minerals oral tablet: 1 tab(s) orally once a day (14 Sep 2022 19:13)  pantoprazole 40 mg oral delayed release tablet: 1 tab(s) orally once a day (14 Sep 2022 19:13)  polyethylene glycol 3350 oral powder for reconstitution: 17 gram(s) orally once a day (14 Sep 2022 19:13)  Santyl 250 units/g topical ointment: Apply topically to affected area 2 times a day (14 Sep 2022 19:13)  senna oral tablet: 2 tab(s) orally once a day (at bedtime) (14 Sep 2022 19:13)  Synthroid 25 mcg (0.025 mg) oral tablet: 1 tab(s) orally once a day (14 Sep 2022 19:13)  tamsulosin 0.4 mg oral capsule: 2 cap(s) orally once a day (at bedtime) (14 Sep 2022 19:13)        MEDICATIONS  (STANDING):  acetylcysteine 20%  Inhalation 4 milliLiter(s) Inhalation three times a day  albuterol/ipratropium for Nebulization 3 milliLiter(s) Nebulizer every 6 hours  aspirin enteric coated 81 milliGRAM(s) Oral daily  atorvastatin 80 milliGRAM(s) Oral at bedtime  buDESOnide    Inhalation Suspension 0.5 milliGRAM(s) Inhalation two times a day  cadexomer iodine 0.9% Gel 1 Application(s) Topical daily  dextrose 5%. 1000 milliLiter(s) (50 mL/Hr) IV Continuous <Continuous>  dextrose 5%. 1000 milliLiter(s) (100 mL/Hr) IV Continuous <Continuous>  dextrose 50% Injectable 25 Gram(s) IV Push once  dextrose 50% Injectable 12.5 Gram(s) IV Push once  dextrose 50% Injectable 25 Gram(s) IV Push once  finasteride 5 milliGRAM(s) Oral daily  gabapentin 100 milliGRAM(s) Oral two times a day  glucagon  Injectable 1 milliGRAM(s) IntraMuscular once  guaifenesin/dextromethorphan Oral Liquid 10 milliLiter(s) Oral every 6 hours  insulin glargine Injectable (LANTUS) 10 Unit(s) SubCutaneous at bedtime  insulin lispro (ADMELOG) corrective regimen sliding scale   SubCutaneous three times a day before meals  insulin lispro (ADMELOG) corrective regimen sliding scale   SubCutaneous at bedtime  insulin lispro Injectable (ADMELOG) 12 Unit(s) SubCutaneous before dinner  insulin lispro Injectable (ADMELOG) 12 Unit(s) SubCutaneous before lunch  levothyroxine 25 MICROGram(s) Oral daily  metoprolol tartrate 50 milliGRAM(s) Oral two times a day  montelukast 10 milliGRAM(s) Oral at bedtime  multivitamin/minerals 1 Tablet(s) Oral daily  pantoprazole    Tablet 40 milliGRAM(s) Oral before breakfast  polyethylene glycol 3350 17 Gram(s) Oral daily  predniSONE   Tablet 50 milliGRAM(s) Oral daily  sacubitril 24 mG/valsartan 26 mG 1 Tablet(s) Oral two times a day  senna 2 Tablet(s) Oral at bedtime  sodium chloride 3%  Inhalation 4 milliLiter(s) Inhalation every 12 hours  tamsulosin 0.8 milliGRAM(s) Oral at bedtime        EKG:  RADIOLOGY:  DIAGNOSTIC TESTING:  [ ] Echocardiogram:  [ ] Catherterization:  [ ] Stress Test:  OTHER:     LABS:	 	                          9.8    10.57 )-----------( 324      ( 21 Sep 2022 07:36 )             34.5     09-22    143  |  104  |  84<H>  ----------------------------<  51<LL>  3.2<L>   |  27  |  1.85<H>    Ca    8.1<L>      22 Sep 2022 07:30  Mg     1.8     09-21        PT/INR - ( 22 Sep 2022 07:30 )   PT: 34.3 sec;   INR: 2.95 ratio         PTT - ( 22 Sep 2022 07:30 )  PTT:34.8 sec    CARDIAC MARKERS:                  
CARDIOLOGY FOLLOW UP - Dr. Stephens  Date of Service: 9/23/2022  CC: no events    Review of Systems:  Constitutional: No fever, weight loss, or fatigue  Respiratory: No cough, wheezing, or hemoptysis, no shortness of breath  Cardiovascular: No chest pain, palpitations, passing out, dizziness, or leg swelling  Gastrointestinal: No abd or epigastric pain. No nausea, vomiting, or hematemesis; no diarrhea or consiptaiton, no melena or hematochezia  Vascular: No edema     TELEMETRY:    PHYSICAL EXAM:  T(C): 36.2 (09-23-22 @ 12:23), Max: 36.4 (09-22-22 @ 21:09)  HR: 61 (09-23-22 @ 12:23) (60 - 67)  BP: 90/50 (09-23-22 @ 12:23) (90/50 - 118/52)  RR: 18 (09-23-22 @ 12:23) (18 - 18)  SpO2: 98% (09-23-22 @ 12:23) (95% - 98%)  Wt(kg): --  I&O's Summary    22 Sep 2022 07:01  -  23 Sep 2022 07:00  --------------------------------------------------------  IN: 180 mL / OUT: 700 mL / NET: -520 mL    23 Sep 2022 07:01  -  23 Sep 2022 13:58  --------------------------------------------------------  IN: 180 mL / OUT: 0 mL / NET: 180 mL        Appearance: Normal	  Cardiovascular: Normal S1 S2,RRR, No JVD, No murmurs  Respiratory: Lungs clear to auscultation	  Gastrointestinal:  Soft, Non-tender, + BS	  Extremities: Normal range of motion, No clubbing, cyanosis or edema  Vascular: Peripheral pulses palpable 2+ bilaterally       Home Medications:  albuterol 2.5 mg/3 mL (0.083%) inhalation solution: 3 milliliter(s) inhaled every 6 hours (14 Sep 2022 19:13)  aspirin 81 mg oral delayed release tablet: 1 tab(s) orally once a day (14 Sep 2022 19:13)  bacitracin 500 units/g topical ointment: Apply topically to affected area 2 times a day (14 Sep 2022 19:13)  budesonide 0.5 mg/2 mL inhalation suspension: 0.5 milligram(s) inhaled 2 times a day (14 Sep 2022 19:13)  Coumadin 5 mg oral tablet: 1 tab(s) orally once a day (at bedtime) (14 Sep 2022 19:13)  Crestor 10 mg oral tablet: 1 tab(s) orally once a day (at bedtime) (14 Sep 2022 19:13)  finasteride 5 mg oral tablet: 1 tab(s) orally once a day (14 Sep 2022 19:13)  furosemide 20 mg oral tablet: 1 tab(s) orally once a day (14 Sep 2022 19:13)  gabapentin 100 mg oral tablet: 1 tab(s) orally 2 times a day (14 Sep 2022 19:13)  glycopyrrolate 1 mg oral tablet: 1 tab(s) orally 2 times a day (14 Sep 2022 19:13)  insulin lispro 100 units/mL injectable solution: 3 unit(s) injectable 3 times a day (before meals) (14 Sep 2022 19:13)  ipratropium 500 mcg/2.5 mL inhalation solution: 2.5 milliliter(s) inhaled 4 times a day (14 Sep 2022 19:13)  Levemir FlexTouch 100 units/mL subcutaneous solution: 30 unit(s) subcutaneous once a day (at bedtime) (14 Sep 2022 19:13)  losartan 100 mg oral tablet: 1 tab(s) orally once a day (14 Sep 2022 19:13)  metoprolol succinate 25 mg oral tablet, extended release: 1 tab(s) orally once a day (14 Sep 2022 19:13)  montelukast 10 mg oral tablet: 1 tab(s) orally once a day (14 Sep 2022 19:13)  Multiple Vitamins with Minerals oral tablet: 1 tab(s) orally once a day (14 Sep 2022 19:13)  pantoprazole 40 mg oral delayed release tablet: 1 tab(s) orally once a day (14 Sep 2022 19:13)  polyethylene glycol 3350 oral powder for reconstitution: 17 gram(s) orally once a day (14 Sep 2022 19:13)  Santyl 250 units/g topical ointment: Apply topically to affected area 2 times a day (14 Sep 2022 19:13)  senna oral tablet: 2 tab(s) orally once a day (at bedtime) (14 Sep 2022 19:13)  Synthroid 25 mcg (0.025 mg) oral tablet: 1 tab(s) orally once a day (14 Sep 2022 19:13)  tamsulosin 0.4 mg oral capsule: 2 cap(s) orally once a day (at bedtime) (14 Sep 2022 19:13)        MEDICATIONS  (STANDING):  acetylcysteine 20%  Inhalation 4 milliLiter(s) Inhalation three times a day  albuterol/ipratropium for Nebulization 3 milliLiter(s) Nebulizer every 6 hours  aspirin enteric coated 81 milliGRAM(s) Oral daily  atorvastatin 80 milliGRAM(s) Oral at bedtime  buDESOnide    Inhalation Suspension 0.5 milliGRAM(s) Inhalation two times a day  cadexomer iodine 0.9% Gel 1 Application(s) Topical daily  dextrose 5%. 1000 milliLiter(s) (100 mL/Hr) IV Continuous <Continuous>  dextrose 5%. 1000 milliLiter(s) (50 mL/Hr) IV Continuous <Continuous>  dextrose 50% Injectable 25 Gram(s) IV Push once  dextrose 50% Injectable 12.5 Gram(s) IV Push once  dextrose 50% Injectable 25 Gram(s) IV Push once  finasteride 5 milliGRAM(s) Oral daily  gabapentin 100 milliGRAM(s) Oral two times a day  glucagon  Injectable 1 milliGRAM(s) IntraMuscular once  guaifenesin/dextromethorphan Oral Liquid 10 milliLiter(s) Oral every 6 hours  insulin glargine Injectable (LANTUS) 10 Unit(s) SubCutaneous at bedtime  insulin lispro (ADMELOG) corrective regimen sliding scale   SubCutaneous at bedtime  insulin lispro (ADMELOG) corrective regimen sliding scale   SubCutaneous three times a day before meals  insulin lispro Injectable (ADMELOG) 12 Unit(s) SubCutaneous before dinner  insulin lispro Injectable (ADMELOG) 18 Unit(s) SubCutaneous before breakfast  insulin lispro Injectable (ADMELOG) 12 Unit(s) SubCutaneous before lunch  levothyroxine 25 MICROGram(s) Oral daily  metoprolol tartrate 50 milliGRAM(s) Oral two times a day  montelukast 10 milliGRAM(s) Oral at bedtime  multivitamin/minerals 1 Tablet(s) Oral daily  pantoprazole    Tablet 40 milliGRAM(s) Oral before breakfast  polyethylene glycol 3350 17 Gram(s) Oral daily  predniSONE   Tablet 50 milliGRAM(s) Oral daily  senna 2 Tablet(s) Oral at bedtime  sodium chloride 3%  Inhalation 4 milliLiter(s) Inhalation every 12 hours  tamsulosin 0.8 milliGRAM(s) Oral at bedtime  warfarin 3 milliGRAM(s) Oral once        EKG:  RADIOLOGY:  DIAGNOSTIC TESTING:  [ ] Echocardiogram:  [ ] Catherterization:  [ ] Stress Test:  OTHER:     LABS:	 	                          9.8    12.65 )-----------( 299      ( 23 Sep 2022 07:11 )             34.4     09-23    142  |  103  |  94<H>  ----------------------------<  168<H>  4.0   |  26  |  1.70<H>    Ca    8.3<L>      23 Sep 2022 07:08        PT/INR - ( 23 Sep 2022 07:11 )   PT: 22.4 sec;   INR: 1.93 ratio         PTT - ( 22 Sep 2022 07:30 )  PTT:34.8 sec    CARDIAC MARKERS:                  
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 Endocrinology consulted for hyperglycemia management in the setting of T2DM exacerbated by steroid.      Home diabetes medications:   - According to outpatient records, patient said he doesn't know his dose at home   - Levemir 30 units QHS  - Lispro 3 units TID with meals   Also on crestor 10 mg   Levothyroxine 25 mcg     Inpatient diabetes medications:   - Glargine 26 units at bed time   - Admelog 13 units TID with meals   - [facility]     Most recent HbA1C: 9.1      INTERVAL HPI/OVERNIGHT EVENTS:  Patient is currently on Methylprednisolone 20 mg Q6H.   Currently denies polydipsia, polyuria , visual changes, numbness in feet. Seen at the bedside, sitting in a chair. Ate breakfast which consisted of corn flakes, [place] toast and egg. Denies nausea or vomiting.       Review of systems:   CONSTITUTIONAL:  Feels well, good appetite  CARDIOVASCULAR:  Negative for chest pain or palpitations  RESPIRATORY:  Negative for cough, or SOB   GASTROINTESTINAL:  Negative for nausea, vomiting, or abdominal pain  GENITOURINARY:  Negative frequency, urgency or dysuria     CAPILLARY BLOOD GLUCOSE      POCT Blood Glucose.: 229 mg/dL (18 Sep 2022 08:58)  POCT Blood Glucose.: 319 mg/dL (17 Sep 2022 21:29)  POCT Blood Glucose.: 366 mg/dL (17 Sep 2022 17:34)       MEDICATIONS  (STANDING):  albuterol/ipratropium for Nebulization 3 milliLiter(s) Nebulizer every 6 hours  aspirin enteric coated 81 milliGRAM(s) Oral daily  atorvastatin 80 milliGRAM(s) Oral at bedtime  buDESOnide    Inhalation Suspension 0.5 milliGRAM(s) Inhalation two times a day  cadexomer iodine 0.9% Gel 1 Application(s) Topical daily  dextrose 5%. 1000 milliLiter(s) (100 mL/Hr) IV Continuous <Continuous>  dextrose 5%. 1000 milliLiter(s) (50 mL/Hr) IV Continuous <Continuous>  dextrose 50% Injectable 25 Gram(s) IV Push once  dextrose 50% Injectable 12.5 Gram(s) IV Push once  dextrose 50% Injectable 25 Gram(s) IV Push once  doxycycline IVPB      doxycycline IVPB 100 milliGRAM(s) IV Intermittent every 12 hours  finasteride 5 milliGRAM(s) Oral daily  furosemide   Injectable 40 milliGRAM(s) IV Push every 12 hours  gabapentin 100 milliGRAM(s) Oral two times a day  glucagon  Injectable 1 milliGRAM(s) IntraMuscular once  guaifenesin/dextromethorphan Oral Liquid 10 milliLiter(s) Oral every 6 hours  insulin glargine Injectable (LANTUS) 26 Unit(s) SubCutaneous at bedtime  insulin lispro (ADMELOG) corrective regimen sliding scale   SubCutaneous three times a day before meals  insulin lispro (ADMELOG) corrective regimen sliding scale   SubCutaneous at bedtime  insulin lispro Injectable (ADMELOG) 20 Unit(s) SubCutaneous three times a day before meals  levothyroxine 25 MICROGram(s) Oral daily  methylPREDNISolone sodium succinate Injectable 20 milliGRAM(s) IV Push every 6 hours  metoprolol tartrate 50 milliGRAM(s) Oral two times a day  metoprolol tartrate 25 milliGRAM(s) Oral once  montelukast 10 milliGRAM(s) Oral at bedtime  multivitamin/minerals 1 Tablet(s) Oral daily  pantoprazole    Tablet 40 milliGRAM(s) Oral before breakfast  piperacillin/tazobactam IVPB.. 3.375 Gram(s) IV Intermittent every 8 hours  polyethylene glycol 3350 17 Gram(s) Oral daily  potassium chloride    Tablet ER 40 milliEquivalent(s) Oral every 4 hours  sacubitril 24 mG/valsartan 26 mG 1 Tablet(s) Oral two times a day  senna 2 Tablet(s) Oral at bedtime  tamsulosin 0.8 milliGRAM(s) Oral at bedtime    MEDICATIONS  (PRN):  acetaminophen     Tablet .. 650 milliGRAM(s) Oral every 6 hours PRN Moderate Pain (4 - 6)  dextrose Oral Gel 15 Gram(s) Oral once PRN Blood Glucose LESS THAN 70 milliGRAM(s)/deciliter      PHYSICAL EXAM  Vital Signs Last 24 Hrs  T(C): 36.4 (18 Sep 2022 12:30), Max: 36.4 (17 Sep 2022 21:19)  T(F): 97.5 (18 Sep 2022 12:30), Max: 97.5 (17 Sep 2022 21:19)  HR: 62 (18 Sep 2022 12:30) (62 - 94)  BP: 124/60 (18 Sep 2022 12:30) (120/64 - 146/67)  BP(mean): --  RR: 18 (18 Sep 2022 12:30) (18 - 18)  SpO2: 96% (18 Sep 2022 12:30) (93% - 99%)    Parameters below as of 18 Sep 2022 12:30  Patient On (Oxygen Delivery Method): nasal cannula        GENERAL: Male laying in bed in NAD  RESPIRATORY: nonlabored breathing, no accessory muscle use  Extremities: Warm, no edema in all 4 exts   NEURO: A&O X3    LABS:    09-18    142  |  103  |  56<H>  ----------------------------<  193<H>  3.1<L>   |  25  |  1.56<H>    Ca    8.6      18 Sep 2022 06:52  Phos  3.4     09-18  Mg     1.8     09-18      PT/INR - ( 18 Sep 2022 06:51 )   PT: 28.4 sec;   INR: 2.43 ratio         PTT - ( 18 Sep 2022 06:51 )  PTT:30.4 sec    Thyroid Stimulating Hormone, Serum: 1.71 uIU/mL (09-16 @ 05:26)       
[facility] - [facility] - PROGRESS NOTE    CHIEF COMPLAINT: hypoxemia; chronic hypercapnic respiratory failure; COPD exacerbation; emphysema; acute on chronic systolic heart failure; bilateral pleural effusions; atelectasis; pneumonia    INTERVAL HISTORY: weak and frail; shortness of breath is much improved following a large volume left sided thoracentesis but still remains on a 3lpm nasal canula; decreased cough with scant sputum production; improved chest congestion and wheeze; no fevers, chills or sweats; no chest pain/pressure or palpitations; discomfort at his right BKA stump and right heel; 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    REVIEW OF SYSTEMS:  Constitutional: As per interval history  HEENT: Within normal limits  CV: As per interval history  Resp: As per interval history  GI: dysphagia  : Within normal limits  Musculoskeletal: Within normal limits  Skin: Within normal limits  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 - left facial droop - dysphagia   Psychiatric: Within normal limits  Endocrine: diabetes  Hematologic/Lymphatic: Within normal limits  Allergic/Immunologic: Within normal limits      MEDICATIONS:     Pulmonary "  albuterol/ipratropium for Nebulization 3 milliLiter(s) Nebulizer every 6 hours  buDESOnide    Inhalation Suspension 0.5 milliGRAM(s) Inhalation two times a day  guaifenesin/dextromethorphan Oral Liquid 10 milliLiter(s) Oral every 6 hours  montelukast 10 milliGRAM(s) Oral at bedtime    Anti-microbials:  doxycycline IVPB 100 milliGRAM(s) IV Intermittent every 12 hours  doxycycline IVPB      piperacillin/tazobactam IVPB.. 3.375 Gram(s) IV Intermittent every 8 hours    Cardiovascular:  furosemide   Injectable 40 milliGRAM(s) IV Push every 12 hours  metoprolol tartrate 50 milliGRAM(s) Oral two times a day  sacubitril 24 mG/valsartan 26 mG 1 Tablet(s) Oral two times a day  tamsulosin 0.8 milliGRAM(s) Oral at bedtime    Other:  aspirin enteric coated 81 milliGRAM(s) Oral daily  atorvastatin 80 milliGRAM(s) Oral at bedtime  cadexomer iodine 0.9% Gel 1 Application(s) Topical daily  dextrose 5%. 1000 milliLiter(s) IV Continuous <Continuous>  dextrose 5%. 1000 milliLiter(s) IV Continuous <Continuous>  dextrose 50% Injectable 25 Gram(s) IV Push once  dextrose 50% Injectable 12.5 Gram(s) IV Push once  dextrose 50% Injectable 25 Gram(s) IV Push once  finasteride 5 milliGRAM(s) Oral daily  gabapentin 100 milliGRAM(s) Oral two times a day  glucagon  Injectable 1 milliGRAM(s) IntraMuscular once  insulin glargine Injectable (LANTUS) 26 Unit(s) SubCutaneous at bedtime  insulin lispro (ADMELOG) corrective regimen sliding scale   SubCutaneous three times a day before meals  insulin lispro (ADMELOG) corrective regimen sliding scale   SubCutaneous at bedtime  insulin lispro Injectable (ADMELOG) 20 Unit(s) SubCutaneous three times a day before meals  levothyroxine 25 MICROGram(s) Oral daily  methylPREDNISolone sodium succinate Injectable 20 milliGRAM(s) IV Push every 6 hours  multivitamin/minerals 1 Tablet(s) Oral daily  pantoprazole    Tablet 40 milliGRAM(s) Oral before breakfast  polyethylene glycol 3350 17 Gram(s) Oral daily  senna 2 Tablet(s) Oral at bedtime    MEDICATIONS  (PRN):  acetaminophen     Tablet .. 650 milliGRAM(s) Oral every 6 hours PRN Moderate Pain (4 - 6)  dextrose Oral Gel 15 Gram(s) Oral once PRN Blood Glucose LESS THAN 70 milliGRAM(s)/deciliter    OBJECTIVE:    POCT Blood Glucose.: 212 mg/dL (18 Sep 2022 22:09)  POCT Blood Glucose.: 176 mg/dL (18 Sep 2022 17:44)  POCT Blood Glucose.: 343 mg/dL (18 Sep 2022 13:26)  POCT Blood Glucose.: 229 mg/dL (18 Sep 2022 08:58)      PHYSICAL EXAM:       ICU Vital Signs Last 24 Hrs  T(C): 36.4 (19 Sep 2022 04:32), Max: 36.4 (18 Sep 2022 12:30)  T(F): 97.6 (19 Sep 2022 04:32), Max: 97.6 (18 Sep 2022 22:11)  HR: 69 (19 Sep 2022 04:32) (62 - 69)  BP: 135/72 (19 Sep 2022 04:32) (124/60 - 148/68)  BP(mean): --  ABP: --  ABP(mean): --  RR: 18 (19 Sep 2022 04:32) (18 - 18)  SpO2: 97% (19 Sep 2022 04:32) (96% - 97%) on a 2lpm nasal canula    General: Awake. Alert. Cooperative. No distress. Appears stated age. Obese.   HEENT: Atraumatic. Normocephalic. Anicteric. Normal oral mucosa. PERRL. EOMI.  Neck: Supple. Trachea midline. Thyroid without enlargement/tenderness/nodules. No carotid bruit. No JVD.	  Cardiovascular: Regular rate and rhythm. Distant S1 S2. No murmurs, rubs or gallops.  Respiratory: Respirations unlabored. Decreased bilateral rhonchi and wheeze. Improved breath sounds left hemithorax. Decreased breath sounds at the bases with dullness to percussion. No curvature.  Abdomen: Soft. Non-tender. Non-distended. No organomegaly. No masses. Normal bowel sounds. 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 Left facial droop. A and O x 3  Psychiatry: Appropriate mood and affect.    LABS:      CBC    WBC  7.73 <==, 9.40 <==    Hemoglobin  8.5 <<==, 7.7 <<==    Hematocrit  30.1 <==, 27.5 <==    Platelets  293 <==, 293 <==      143  |  102  |  56<H>  ----------------------------<  218<H>    09-19  3.4<L>   |  29  |  1.44<H>      LYTES    sodium  143 <==, 142 <==, 138 <==, 140 <==, 143 <==    potassium   3.4 <==, 3.1 <==, 3.5 <==, 4.1 <==, 4.0 <==    chloride  102 <==, 103 <==, 100 <==, 102 <==, 104 <==    carbon dioxide  29 <==, 25 <==, 29 <==, 27 <==, 31 <==    =============================================================================================  RENAL FUNCTION:    Creatinine:   1.44  <<==, 1.56  <<==, 1.76  <<==, 0.95  <<==, 0.89  <<==    BUN:   56 <==, 56 <==, 49 <==, 21 <==, 22 <==    ============================================================================================    calcium   8.7 <==, 8.6 <==, 8.3 <==, 8.7 <==, 8.2 <==    phos   3.4 <==    mag   1.8 <==, 1.8 <==, 1.9 <==, 1.8 <==    ============================================================================================  LFTs    AST:   12 <==     ALT:  11  <==     AP:  79  <=    Bili:  0.3  <=    PT/INR - ( 19 Sep 2022 07:22 )   PT: 42.8 sec;   INR: 3.67 ratio       PTT - ( 19 Sep 2022 07:22 )  PTT:31.0 sec    Venous Blood Gas:  09-14 @ 13:10  7.38/57/31/34/45.9  VBG Lactate: 1.2    Serum Pro-Brain Natriuretic Peptide: 2388 pg/mL (09-14 @ 13:36)    CARDIAC MARKERS ( 15 Sep 2022 07:22 )  CPK x     /CKMB x     /CKMB Units x        troponin 165 ng/L    CARDIAC MARKERS ( 14 Sep 2022 16:27 )  CPK x     /CKMB x     /CKMB Units x        troponin 145 ng/L    CARDIAC MARKERS ( 14 Sep 2022 13:36 )  CPK x     /CKMB x     /CKMB Units x        troponin 154 ng/L    < from: TTE with Doppler (w/Cont) (09.15.22 @ 12:59) >    Patient name: Martir Heart  YOB: 1935   Age: 87 (M)   MR#: [id number]  Study Date: 9/15/2022  ------------------------------------------------------------------------  Dimensions:    Normal Values:  LA:     3.5    2.0 - 4.0 cm  Ao:     3.6    2.0 - 3.8 cm  SEPTUM: 1.0    0.6 - 1.2 cm  PWT:    1.0    0.6 - 1.1 cm  LVIDd:  4.3    3.0 - 5.6 cm  LVIDs:  3.4    1.8 - 4.0 cm  Derived variables:  LVMI: 78 g/m2  RWT: 0.50  Fractional short: 21 %  EF (Visual Estimate): 35-40 %  Doppler Peak Velocity (m/sec): AoV=1.6  ---------------------------------------------------------------------------------------------------------------  Conclusions:  1. Increased relative wall thickness with normal left  ventricular mass index, consistent with concentric left  ventricular remodeling.  2. Endocardial visualization enhanced with intravenous  injection of Ultrasonic Enhancing Agent (Lumason). Severe  segmental left ventricular systolic dysfunction. The mid to  distal anteroseptum and severely hypokinetic. The mid to  basal inferolateral wall is hypokinetic.  3. Increased E/e'  is consistent with elevated left  ventricular filling pressure.  4. Normal right ventricular size and function.  *** Compared with echocardiogram of 4/3/2022, no  significant changes noted.  ------------------------------------------------------------------------  Confirmed on  9/15/2022 - 16:03:54 by Zain Tucker M.D.  ------------------------------------------------------------------------  ---------------------------------------------------------------------------------------------------------------    MICROBIOLOGY:     Respiratory Viral Panel with COVID-19 by TANJA (09.14.22 @ 13:36)   Rapid RVP Result: [facility]   SARS-CoV-2: [facility]  This Respiratory Panel uses polymerase chain reaction (PCR) to detect for   adenovirus; coronavirus (HKU1, NL63, 229E, OC43); human metapneumovirus   (hMPV); human enterovirus/rhinovirus (Entero/RV); influenza A; influenza   A/H1; influenza A/H3; influenza A/H1-2009; influenza B; parainfluenza   viruses 1, 2, 3, 4; respiratory syncytial virus; Mycoplasma pneumoniae;   Chlamydophila pneumoniae; and SARS-CoV-2.         RADIOLOGY:  [x] Chest radiographs reviewed and interpreted by me    < from: Xray Chest 1 View AP/PA (09.15.22 @ 12:26) >    ACC: [id number] EXAM:  XR CHEST AP OR PA 1V                          PROCEDURE DATE:  09/15/2022          INTERPRETATION:  Chest one view    HISTORY: Postthoracentesis    COMPARISON STUDY: 9/14/2022    Frontal expiratory view of the chest shows the heart to be similar in   size. Left cardiac pacemaker is again noted.    The lungs show less right atelectasis with similar left effusion and   there is no evidence of pneumothorax nor right pleural effusion.    IMPRESSION:  No pneumothorax.    HORACIO HELMS MD; Attending Interventional Radiologist  This document has been electronically signed. Sep 16 2022 11:18AM  ---------------------------------------------------------------------------------------------------------------  EXAM:  XR CHEST PORTABLE URGENT 1V                          PROCEDURE DATE:  09/14/2022      FINDINGS:    Left chest wall pacemaker.  The heart size cannot be assessed on this projection.  Small left pleural effusion with associated atelectasis, mildly increased   since 4/18/2022. The right lung is clear. No pneumothorax.    IMPRESSION:  Small left pleural effusion mildly increased since 4/18/2022.    YAN DELGADO MD; Resident Radiology  This document has been electronically signed.  SATURNINO CHERRY MD; Attending Radiologist  This document has been electronically signed. Sep 14 2022  2:07PM    ---------------------------------------------------------------------------------------------------------------  EXAM:  CT ANGIO CHEST PUL ABHIJEET MILLER                          PROCEDURE DATE:  09/14/2022      FINDINGS:    LUNGS AND AIRWAYS/PLEURA: Patent central airways.  Centrilobular   emphysema. Moderate left and small right pleural effusions with   associated partial right and complete left lower lobe compressive   atelectasis. There are patchy and nodular areas of consolidation   throughout the aerated portions of the right lung dependently, new since   May 2022 (i.e. 2:54 in the posterior right upper lobe). Other nodular   areas of consolidation in the posterior left upper lobe persist although   have improved since May 2022 (2:38).    MEDIASTINUM AND MARK: No lymphadenopathy.    VESSELS: Calcifications of the aorta and coronary arteries. No main,   right, left, lobar or segmental pulmonary embolus. Limited evaluation of   the subsegmental branches. And great vessels are normal in size.    HEART: Left chest wall dual chamber pacemaker with right atrial and right   ventricular leads. Heart size is normal. No pericardial effusion.    VISUALIZED UPPER ABDOMEN: Cholelithiasis.    BONES: Remote left posterior rib fractures.    IMPRESSION:  No pulmonary embolism.    Moderate left and small right pleural effusions, increased since May   2022, with worsening lower lobe compressive atelectasis.    New right lung patchy and nodular areas of consolidation, likely   infectious/inflammatory.    JAQUAN LARA MD; Resident Radiologist  This document has been electronically signed.  MELANY MCNAIR M.D., Attending Radiologist  This document has been electronically signed. Sep 14 2022  5:06PM  ---------------------------------------------------------------------------------------------------------------        
[facility]  DATE: 9/22/2022    PROCEDURE: THORACENTESIS    INDICATION: PLEURAL EFFUSION                    [ ] DIAGNOSTIC                    [x] THERAPEUTIC                            [x] CHF                                    [ ] r/o neoplasm                   [ ] r/o empyema                    [ ] r/o hemothorax      PRE-PROCEDURE  Informed consent was obtained after the risks and benefits of the procedure were explained. Risks described included but were not limited to bleeding, cough, pneumothorax (potentially requiring chest tube placement), vaso-vagal reactions, chest pain and death.    A TIME OUT was performed prior to the procedure with verbal confirmation of correct patient identity, correct site, availability of necessary equipment, and accuracy of the consent form. Safety precautions based on the patient's history and medication use have been addressed.    The patient was placed in a sitting position at the edge of the bed with arms resting on a table. After localization of the pleural fluid using ultrasound guidance, the posterior     [x] left    [ ] right      chest wall was prepped and draped with strict adherence to aseptic technique. Xylocaine solution was used for local anesthesia. A thoracentesis catheter was then advanced into the pleural space.       Approximately   600 cc  of                [x] clear yellow          [ ] straw colored          [ ] serosanguinous          [ ] blood-tinged          [ ] bloody          [ ] purulent          [ ] chylous          [ ] cloudy     fluid was removed.    The patient tolerated the procedure well and there were no immediate complications. Post-procedure vital signs were stable.    [ ] Specimens were sent for cytology, AFB smear and culture, fungal culture, routine culture, gram stain, cell count, pH and chemistry.    [x] Specimens were not sent.    [x] A post-procedure CXR to r/o PTX has been ordered and will be checked.        Sarabjit Wright MD, [facility] [place]  [phone]            
Patient is a 87y old  Male who presents with a chief complaint of acute rayshawn chronic diastolic CHF exacerbation, Pneumonia (20 Sep 2022 15:00)      SUBJECTIVE / OVERNIGHT EVENTS: ptn w no new c/o, switched to po Prednisone    MEDICATIONS  (STANDING):  albuterol/ipratropium for Nebulization 3 milliLiter(s) Nebulizer every 6 hours  aspirin enteric coated 81 milliGRAM(s) Oral daily  atorvastatin 80 milliGRAM(s) Oral at bedtime  buDESOnide    Inhalation Suspension 0.5 milliGRAM(s) Inhalation two times a day  cadexomer iodine 0.9% Gel 1 Application(s) Topical daily  dextrose 5%. 1000 milliLiter(s) (100 mL/Hr) IV Continuous <Continuous>  dextrose 5%. 1000 milliLiter(s) (50 mL/Hr) IV Continuous <Continuous>  dextrose 50% Injectable 25 Gram(s) IV Push once  dextrose 50% Injectable 12.5 Gram(s) IV Push once  dextrose 50% Injectable 25 Gram(s) IV Push once  doxycycline IVPB 100 milliGRAM(s) IV Intermittent every 12 hours  doxycycline IVPB      finasteride 5 milliGRAM(s) Oral daily  furosemide   Injectable 40 milliGRAM(s) IV Push every 12 hours  gabapentin 100 milliGRAM(s) Oral two times a day  glucagon  Injectable 1 milliGRAM(s) IntraMuscular once  guaifenesin/dextromethorphan Oral Liquid 10 milliLiter(s) Oral every 6 hours  insulin glargine Injectable (LANTUS) 26 Unit(s) SubCutaneous at bedtime  insulin lispro (ADMELOG) corrective regimen sliding scale   SubCutaneous three times a day before meals  insulin lispro (ADMELOG) corrective regimen sliding scale   SubCutaneous at bedtime  insulin lispro Injectable (ADMELOG) 20 Unit(s) SubCutaneous three times a day before meals  levothyroxine 25 MICROGram(s) Oral daily  metoprolol tartrate 50 milliGRAM(s) Oral two times a day  montelukast 10 milliGRAM(s) Oral at bedtime  multivitamin/minerals 1 Tablet(s) Oral daily  pantoprazole    Tablet 40 milliGRAM(s) Oral before breakfast  piperacillin/tazobactam IVPB.. 3.375 Gram(s) IV Intermittent every 8 hours  polyethylene glycol 3350 17 Gram(s) Oral daily  potassium chloride    Tablet ER 40 milliEquivalent(s) Oral once  predniSONE   Tablet 50 milliGRAM(s) Oral daily  sacubitril 24 mG/valsartan 26 mG 1 Tablet(s) Oral two times a day  senna 2 Tablet(s) Oral at bedtime  tamsulosin 0.8 milliGRAM(s) Oral at bedtime    MEDICATIONS  (PRN):  acetaminophen     Tablet .. 650 milliGRAM(s) Oral every 6 hours PRN Moderate Pain (4 - 6)  dextrose Oral Gel 15 Gram(s) Oral once PRN Blood Glucose LESS THAN 70 milliGRAM(s)/deciliter      Vital Signs Last 24 Hrs  T(F): 97.6 (09-20-22 @ 11:45), Max: 97.6 (09-19-22 @ 20:40)  HR: 58 (09-20-22 @ 13:32) (58 - 76)  BP: 106/53 (09-20-22 @ 13:32) (106/53 - 130/66)  RR: 18 (09-20-22 @ 11:45) (18 - 18)  SpO2: 96% (09-20-22 @ 13:32) (90% - 100%)  Telemetry:   CAPILLARY BLOOD GLUCOSE      POCT Blood Glucose.: 101 mg/dL (20 Sep 2022 12:58)  POCT Blood Glucose.: 73 mg/dL (20 Sep 2022 09:04)  POCT Blood Glucose.: 116 mg/dL (19 Sep 2022 22:28)  POCT Blood Glucose.: 165 mg/dL (19 Sep 2022 17:46)    I&O's Summary    19 Sep 2022 07:01  -  20 Sep 2022 07:00  --------------------------------------------------------  IN: 0 mL / OUT: 1800 mL / NET: -1800 mL    20 Sep 2022 07:01  -  20 Sep 2022 16:35  --------------------------------------------------------  IN: 0 mL / OUT: 800 mL / NET: -800 mL        PHYSICAL EXAM:  GENERAL: NAD, well-developed  HEAD:  Atraumatic, Normocephalic  EYES: EOMI, PERRLA, conjunctiva and sclera clear  NECK: Supple, No JVD  CHEST/LUNG: Clear to auscultation bilaterally; No wheeze  HEART: Regular rate and rhythm; No murmurs, rubs, or gallops  ABDOMEN: Soft, Nontender, Nondistended; Bowel sounds present  EXTREMITIES:  2+ Peripheral Pulses, No clubbing, cyanosis, or edema  PSYCH: AAOx3  NEUROLOGY: non-focal  SKIN: No rashes or lesions    LABS:    09-20    145  |  105  |  63<H>  ----------------------------<  41<LL>  3.4<L>   |  27  |  1.48<H>    Ca    8.5      20 Sep 2022 07:28  Mg     1.8     09-19      PT/INR - ( 20 Sep 2022 07:28 )   PT: 53.5 sec;   INR: 4.58 ratio         PTT - ( 20 Sep 2022 07:28 )  PTT:36.7 sec          RADIOLOGY & ADDITIONAL TESTS:    Imaging Personally Reviewed:    Consultant(s) Notes Reviewed:      Care Discussed with Consultants/Other Providers:  
Patient is a 87y old  Male who presents with a chief complaint of acute rayshawn chronic diastolic CHF exacerbation, Pneumonia (23 Sep 2022 16:23)      SUBJECTIVE / OVERNIGHT EVENTS: no new c/o    MEDICATIONS  (STANDING):  acetylcysteine 20%  Inhalation 4 milliLiter(s) Inhalation three times a day  albuterol/ipratropium for Nebulization 3 milliLiter(s) Nebulizer every 6 hours  aspirin enteric coated 81 milliGRAM(s) Oral daily  atorvastatin 80 milliGRAM(s) Oral at bedtime  buDESOnide    Inhalation Suspension 0.5 milliGRAM(s) Inhalation two times a day  cadexomer iodine 0.9% Gel 1 Application(s) Topical daily  dextrose 5%. 1000 milliLiter(s) (50 mL/Hr) IV Continuous <Continuous>  dextrose 5%. 1000 milliLiter(s) (100 mL/Hr) IV Continuous <Continuous>  dextrose 50% Injectable 25 Gram(s) IV Push once  dextrose 50% Injectable 25 Gram(s) IV Push once  dextrose 50% Injectable 12.5 Gram(s) IV Push once  finasteride 5 milliGRAM(s) Oral daily  gabapentin 100 milliGRAM(s) Oral two times a day  glucagon  Injectable 1 milliGRAM(s) IntraMuscular once  guaifenesin/dextromethorphan Oral Liquid 10 milliLiter(s) Oral every 6 hours  insulin glargine Injectable (LANTUS) 10 Unit(s) SubCutaneous at bedtime  insulin lispro (ADMELOG) corrective regimen sliding scale   SubCutaneous three times a day before meals  insulin lispro (ADMELOG) corrective regimen sliding scale   SubCutaneous at bedtime  insulin lispro Injectable (ADMELOG) 12 Unit(s) SubCutaneous before dinner  insulin lispro Injectable (ADMELOG) 18 Unit(s) SubCutaneous before breakfast  insulin lispro Injectable (ADMELOG) 12 Unit(s) SubCutaneous before lunch  levothyroxine 25 MICROGram(s) Oral daily  metoprolol tartrate 50 milliGRAM(s) Oral two times a day  montelukast 10 milliGRAM(s) Oral at bedtime  multivitamin/minerals 1 Tablet(s) Oral daily  pantoprazole    Tablet 40 milliGRAM(s) Oral before breakfast  polyethylene glycol 3350 17 Gram(s) Oral daily  predniSONE   Tablet 50 milliGRAM(s) Oral daily  senna 2 Tablet(s) Oral at bedtime  sodium chloride 3%  Inhalation 4 milliLiter(s) Inhalation every 12 hours  tamsulosin 0.8 milliGRAM(s) Oral at bedtime  warfarin 3 milliGRAM(s) Oral once    MEDICATIONS  (PRN):  acetaminophen     Tablet .. 650 milliGRAM(s) Oral every 6 hours PRN Moderate Pain (4 - 6)  dextrose Oral Gel 15 Gram(s) Oral once PRN Blood Glucose LESS THAN 70 milliGRAM(s)/deciliter      Vital Signs Last 24 Hrs  T(F): 97.2 (09-23-22 @ 12:23), Max: 97.5 (09-22-22 @ 21:09)  HR: 61 (09-23-22 @ 12:23) (60 - 67)  BP: 90/50 (09-23-22 @ 12:23) (90/50 - 118/52)  RR: 18 (09-23-22 @ 12:23) (18 - 18)  SpO2: 98% (09-23-22 @ 12:23) (95% - 98%)  Telemetry:   CAPILLARY BLOOD GLUCOSE      POCT Blood Glucose.: 234 mg/dL (23 Sep 2022 12:48)  POCT Blood Glucose.: 182 mg/dL (23 Sep 2022 08:32)  POCT Blood Glucose.: 288 mg/dL (22 Sep 2022 21:35)    I&O's Summary    22 Sep 2022 07:01  -  23 Sep 2022 07:00  --------------------------------------------------------  IN: 180 mL / OUT: 700 mL / NET: -520 mL    23 Sep 2022 07:01  -  23 Sep 2022 17:25  --------------------------------------------------------  IN: 180 mL / OUT: 0 mL / NET: 180 mL        PHYSICAL EXAM:  GENERAL: NAD, well-developed  HEAD:  Atraumatic, Normocephalic  EYES: EOMI, PERRLA, conjunctiva and sclera clear  NECK: Supple, No JVD  CHEST/LUNG: Clear to auscultation bilaterally; No wheeze  HEART: Regular rate and rhythm; No murmurs, rubs, or gallops  ABDOMEN: Soft, Nontender, Nondistended; Bowel sounds present  EXTREMITIES:  2+ Peripheral Pulses, No clubbing, cyanosis, or edema  PSYCH: AAOx3  NEUROLOGY: non-focal  SKIN: No rashes or lesions    LABS:                        9.8    12.65 )-----------( 299      ( 23 Sep 2022 07:11 )             34.4     09-23    142  |  103  |  94<H>  ----------------------------<  168<H>  4.0   |  26  |  1.70<H>    Ca    8.3<L>      23 Sep 2022 07:08      PT/INR - ( 23 Sep 2022 07:11 )   PT: 22.4 sec;   INR: 1.93 ratio         PTT - ( 22 Sep 2022 07:30 )  PTT:34.8 sec          RADIOLOGY & ADDITIONAL TESTS:    Imaging Personally Reviewed:    Consultant(s) Notes Reviewed:      Care Discussed with Consultants/Other Providers:  
  Patient seen and examined  no complaints    No Known Allergies    Hospital Medications:   MEDICATIONS  (STANDING):  acetylcysteine 20%  Inhalation 4 milliLiter(s) Inhalation three times a day  albuterol/ipratropium for Nebulization 3 milliLiter(s) Nebulizer every 6 hours  aspirin enteric coated 81 milliGRAM(s) Oral daily  atorvastatin 80 milliGRAM(s) Oral at bedtime  buDESOnide    Inhalation Suspension 0.5 milliGRAM(s) Inhalation two times a day  cadexomer iodine 0.9% Gel 1 Application(s) Topical daily  dextrose 5%. 1000 milliLiter(s) (100 mL/Hr) IV Continuous <Continuous>  dextrose 5%. 1000 milliLiter(s) (50 mL/Hr) IV Continuous <Continuous>  dextrose 50% Injectable 25 Gram(s) IV Push once  dextrose 50% Injectable 12.5 Gram(s) IV Push once  dextrose 50% Injectable 25 Gram(s) IV Push once  finasteride 5 milliGRAM(s) Oral daily  furosemide    Tablet 40 milliGRAM(s) Oral two times a day  gabapentin 100 milliGRAM(s) Oral two times a day  glucagon  Injectable 1 milliGRAM(s) IntraMuscular once  guaifenesin/dextromethorphan Oral Liquid 10 milliLiter(s) Oral every 6 hours  insulin glargine Injectable (LANTUS) 20 Unit(s) SubCutaneous at bedtime  insulin lispro (ADMELOG) corrective regimen sliding scale   SubCutaneous three times a day before meals  insulin lispro (ADMELOG) corrective regimen sliding scale   SubCutaneous at bedtime  insulin lispro Injectable (ADMELOG) 15 Unit(s) SubCutaneous three times a day before meals  levothyroxine 25 MICROGram(s) Oral daily  metoprolol tartrate 50 milliGRAM(s) Oral two times a day  montelukast 10 milliGRAM(s) Oral at bedtime  multivitamin/minerals 1 Tablet(s) Oral daily  pantoprazole    Tablet 40 milliGRAM(s) Oral before breakfast  polyethylene glycol 3350 17 Gram(s) Oral daily  predniSONE   Tablet 50 milliGRAM(s) Oral daily  sacubitril 24 mG/valsartan 26 mG 1 Tablet(s) Oral two times a day  senna 2 Tablet(s) Oral at bedtime  sodium chloride 3%  Inhalation 4 milliLiter(s) Inhalation every 12 hours  tamsulosin 0.8 milliGRAM(s) Oral at bedtime        VITALS:  T(F): 97.5 (22 @ 11:54), Max: 97.5 (22 @ 21:07)  HR: 59 (22 @ 11:54)  BP: 101/54 (22 @ 11:54)  RR: 18 (22 @ 11:54)  SpO2: 99% (22 @ 11:54)  Wt(kg): --     @ 07:01  -   @ 07:00  --------------------------------------------------------  IN: 920 mL / OUT: 1300 mL / NET: -380 mL     @ 07:01  -   @ 15:41  --------------------------------------------------------  IN: 360 mL / OUT: 550 mL / NET: -190 mL        PHYSICAL EXAM:  Constitutional: NAD  HEENT: anicteric sclera, oropharynx clear, MMM  Neck: No JVD  Respiratory:b/l rhonchi  Cardiovascular: S1, S2, RRR  Gastrointestinal: BS+, soft, NT/ND  Extremities: + peripheral edema  Neurological: A/O x 2,  Psychiatric: flat affect  : +indwelling hudson.     LABS:      142  |  103  |  80<H>  ----------------------------<  58<L>  3.5   |  28  |  1.73<H>    Ca    8.4      21 Sep 2022 07:40  Mg     1.8           Creatinine Trend: 1.73 <--, 1.48 <--, 1.44 <--, 1.56 <--, 1.76 <--, 0.95 <--                        9.8    10.57 )-----------( 324      ( 21 Sep 2022 07:36 )             34.5     Urine Studies:  Urinalysis Basic - ( 20 Sep 2022 18:50 )    Color: Yellow / Appearance: Clear / S.019 / pH:   Gluc:  / Ketone: Negative  / Bili: Negative / Urobili: Negative   Blood:  / Protein: Negative / Nitrite: Negative   Leuk Esterase: Negative / RBC:  / WBC    Sq Epi:  / Non Sq Epi:  / Bacteria:       Potassium, Random Urine: 35 mmol/L ( @ 04:16)  Sodium, Random Urine: 22 mmol/L ( @ 04:16)  Chloride, Random Urine: 21 mmol/L ( @ 04:16)  Creatinine, Random Urine: 42 mg/dL ( @ 04:16)  Protein/Creatinine Ratio Calculation: <0.2 Ratio ( @ 04:16)  Osmolality, Random Urine: 455 mos/kg ( @ 18:50)    RADIOLOGY & ADDITIONAL STUDIES:  
CARDIOLOGY FOLLOW UP - Dr. Stephens  Date of Service: 9/17/2022  CC: no events    Review of Systems:  Constitutional: No fever, weight loss, or fatigue  Respiratory: No cough, wheezing, or hemoptysis, no shortness of breath  Cardiovascular: No chest pain, palpitations, passing out, dizziness, or leg swelling  Gastrointestinal: No abd or epigastric pain. No nausea, vomiting, or hematemesis; no diarrhea or consiptaiton, no melena or hematochezia  Vascular: No edema     TELEMETRY:    PHYSICAL EXAM:  T(C): 36.3 (09-17-22 @ 05:02), Max: 36.5 (09-16-22 @ 21:12)  HR: 75 (09-17-22 @ 05:02) (64 - 91)  BP: 142/66 (09-17-22 @ 05:02) (119/61 - 142/66)  RR: 18 (09-17-22 @ 05:02) (18 - 18)  SpO2: 100% (09-17-22 @ 05:02) (90% - 100%)  Wt(kg): --  I&O's Summary    16 Sep 2022 07:01  -  17 Sep 2022 07:00  --------------------------------------------------------  IN: 540 mL / OUT: 1200 mL / NET: -660 mL        Appearance: Normal	  Cardiovascular: Normal S1 S2,RRR, No JVD, No murmurs  Respiratory: Lungs clear to auscultation	  Gastrointestinal:  Soft, Non-tender, + BS	  Extremities: Normal range of motion, No clubbing, cyanosis or edema  Vascular: Peripheral pulses palpable 2+ bilaterally       Home Medications:  albuterol 2.5 mg/3 mL (0.083%) inhalation solution: 3 milliliter(s) inhaled every 6 hours (14 Sep 2022 19:13)  aspirin 81 mg oral delayed release tablet: 1 tab(s) orally once a day (14 Sep 2022 19:13)  bacitracin 500 units/g topical ointment: Apply topically to affected area 2 times a day (14 Sep 2022 19:13)  budesonide 0.5 mg/2 mL inhalation suspension: 0.5 milligram(s) inhaled 2 times a day (14 Sep 2022 19:13)  Coumadin 5 mg oral tablet: 1 tab(s) orally once a day (at bedtime) (14 Sep 2022 19:13)  Crestor 10 mg oral tablet: 1 tab(s) orally once a day (at bedtime) (14 Sep 2022 19:13)  finasteride 5 mg oral tablet: 1 tab(s) orally once a day (14 Sep 2022 19:13)  furosemide 20 mg oral tablet: 1 tab(s) orally once a day (14 Sep 2022 19:13)  gabapentin 100 mg oral tablet: 1 tab(s) orally 2 times a day (14 Sep 2022 19:13)  glycopyrrolate 1 mg oral tablet: 1 tab(s) orally 2 times a day (14 Sep 2022 19:13)  insulin lispro 100 units/mL injectable solution: 3 unit(s) injectable 3 times a day (before meals) (14 Sep 2022 19:13)  ipratropium 500 mcg/2.5 mL inhalation solution: 2.5 milliliter(s) inhaled 4 times a day (14 Sep 2022 19:13)  Levemir FlexTouch 100 units/mL subcutaneous solution: 30 unit(s) subcutaneous once a day (at bedtime) (14 Sep 2022 19:13)  losartan 100 mg oral tablet: 1 tab(s) orally once a day (14 Sep 2022 19:13)  metoprolol succinate 25 mg oral tablet, extended release: 1 tab(s) orally once a day (14 Sep 2022 19:13)  montelukast 10 mg oral tablet: 1 tab(s) orally once a day (14 Sep 2022 19:13)  Multiple Vitamins with Minerals oral tablet: 1 tab(s) orally once a day (14 Sep 2022 19:13)  pantoprazole 40 mg oral delayed release tablet: 1 tab(s) orally once a day (14 Sep 2022 19:13)  polyethylene glycol 3350 oral powder for reconstitution: 17 gram(s) orally once a day (14 Sep 2022 19:13)  Santyl 250 units/g topical ointment: Apply topically to affected area 2 times a day (14 Sep 2022 19:13)  senna oral tablet: 2 tab(s) orally once a day (at bedtime) (14 Sep 2022 19:13)  Synthroid 25 mcg (0.025 mg) oral tablet: 1 tab(s) orally once a day (14 Sep 2022 19:13)  tamsulosin 0.4 mg oral capsule: 2 cap(s) orally once a day (at bedtime) (14 Sep 2022 19:13)        MEDICATIONS  (STANDING):  albuterol/ipratropium for Nebulization 3 milliLiter(s) Nebulizer every 6 hours  aspirin enteric coated 81 milliGRAM(s) Oral daily  atorvastatin 80 milliGRAM(s) Oral at bedtime  buDESOnide    Inhalation Suspension 0.5 milliGRAM(s) Inhalation two times a day  cadexomer iodine 0.9% Gel 1 Application(s) Topical daily  dextrose 5%. 1000 milliLiter(s) (100 mL/Hr) IV Continuous <Continuous>  dextrose 5%. 1000 milliLiter(s) (50 mL/Hr) IV Continuous <Continuous>  dextrose 50% Injectable 25 Gram(s) IV Push once  dextrose 50% Injectable 12.5 Gram(s) IV Push once  dextrose 50% Injectable 25 Gram(s) IV Push once  doxycycline IVPB      doxycycline IVPB 100 milliGRAM(s) IV Intermittent every 12 hours  finasteride 5 milliGRAM(s) Oral daily  furosemide   Injectable 40 milliGRAM(s) IV Push every 12 hours  gabapentin 100 milliGRAM(s) Oral two times a day  glucagon  Injectable 1 milliGRAM(s) IntraMuscular once  guaifenesin/dextromethorphan Oral Liquid 10 milliLiter(s) Oral every 6 hours  insulin glargine Injectable (LANTUS) 24 Unit(s) SubCutaneous at bedtime  insulin lispro (ADMELOG) corrective regimen sliding scale   SubCutaneous three times a day before meals  insulin lispro Injectable (ADMELOG) 7 Unit(s) SubCutaneous three times a day before meals  levothyroxine 25 MICROGram(s) Oral daily  methylPREDNISolone sodium succinate Injectable 20 milliGRAM(s) IV Push every 6 hours  metoprolol tartrate 25 milliGRAM(s) Oral two times a day  montelukast 10 milliGRAM(s) Oral at bedtime  multivitamin/minerals 1 Tablet(s) Oral daily  pantoprazole    Tablet 40 milliGRAM(s) Oral before breakfast  piperacillin/tazobactam IVPB.. 3.375 Gram(s) IV Intermittent every 8 hours  polyethylene glycol 3350 17 Gram(s) Oral daily  sacubitril 24 mG/valsartan 26 mG 1 Tablet(s) Oral two times a day  senna 2 Tablet(s) Oral at bedtime  tamsulosin 0.8 milliGRAM(s) Oral at bedtime        EKG:  RADIOLOGY:  DIAGNOSTIC TESTING:  [ ] Echocardiogram:  [ ] Catherterization:  [ ] Stress Test:  OTHER:     LABS:	 	              PT/INR - ( 17 Sep 2022 07:23 )   PT: 18.8 sec;   INR: 1.63 ratio             CARDIAC MARKERS:                  
CARDIOLOGY FOLLOW UP - Dr. Stephens  Date of Service: 9/19/2022  CC: no events    Review of Systems:  Constitutional: No fever, weight loss, or fatigue  Respiratory: No cough, wheezing, or hemoptysis, no shortness of breath  Cardiovascular: No chest pain, palpitations, passing out, dizziness, or leg swelling  Gastrointestinal: No abd or epigastric pain. No nausea, vomiting, or hematemesis; no diarrhea or consiptaiton, no melena or hematochezia  Vascular: No edema     TELEMETRY:    PHYSICAL EXAM:  T(C): 36.4 (09-19-22 @ 11:57), Max: 36.4 (09-18-22 @ 22:11)  HR: 64 (09-19-22 @ 11:57) (64 - 69)  BP: 115/58 (09-19-22 @ 11:57) (115/58 - 148/68)  RR: 18 (09-19-22 @ 11:57) (18 - 18)  SpO2: 100% (09-19-22 @ 11:57) (96% - 100%)  Wt(kg): --  I&O's Summary    18 Sep 2022 07:01  -  19 Sep 2022 07:00  --------------------------------------------------------  IN: 0 mL / OUT: 2700 mL / NET: -2700 mL        Appearance: Normal	  Cardiovascular: Normal S1 S2,RRR, No JVD, No murmurs  Respiratory: Lungs clear to auscultation	  Gastrointestinal:  Soft, Non-tender, + BS	  Extremities: Normal range of motion, No clubbing, cyanosis or edema  Vascular: Peripheral pulses palpable 2+ bilaterally       Home Medications:  albuterol 2.5 mg/3 mL (0.083%) inhalation solution: 3 milliliter(s) inhaled every 6 hours (14 Sep 2022 19:13)  aspirin 81 mg oral delayed release tablet: 1 tab(s) orally once a day (14 Sep 2022 19:13)  bacitracin 500 units/g topical ointment: Apply topically to affected area 2 times a day (14 Sep 2022 19:13)  budesonide 0.5 mg/2 mL inhalation suspension: 0.5 milligram(s) inhaled 2 times a day (14 Sep 2022 19:13)  Coumadin 5 mg oral tablet: 1 tab(s) orally once a day (at bedtime) (14 Sep 2022 19:13)  Crestor 10 mg oral tablet: 1 tab(s) orally once a day (at bedtime) (14 Sep 2022 19:13)  finasteride 5 mg oral tablet: 1 tab(s) orally once a day (14 Sep 2022 19:13)  furosemide 20 mg oral tablet: 1 tab(s) orally once a day (14 Sep 2022 19:13)  gabapentin 100 mg oral tablet: 1 tab(s) orally 2 times a day (14 Sep 2022 19:13)  glycopyrrolate 1 mg oral tablet: 1 tab(s) orally 2 times a day (14 Sep 2022 19:13)  insulin lispro 100 units/mL injectable solution: 3 unit(s) injectable 3 times a day (before meals) (14 Sep 2022 19:13)  ipratropium 500 mcg/2.5 mL inhalation solution: 2.5 milliliter(s) inhaled 4 times a day (14 Sep 2022 19:13)  Levemir FlexTouch 100 units/mL subcutaneous solution: 30 unit(s) subcutaneous once a day (at bedtime) (14 Sep 2022 19:13)  losartan 100 mg oral tablet: 1 tab(s) orally once a day (14 Sep 2022 19:13)  metoprolol succinate 25 mg oral tablet, extended release: 1 tab(s) orally once a day (14 Sep 2022 19:13)  montelukast 10 mg oral tablet: 1 tab(s) orally once a day (14 Sep 2022 19:13)  Multiple Vitamins with Minerals oral tablet: 1 tab(s) orally once a day (14 Sep 2022 19:13)  pantoprazole 40 mg oral delayed release tablet: 1 tab(s) orally once a day (14 Sep 2022 19:13)  polyethylene glycol 3350 oral powder for reconstitution: 17 gram(s) orally once a day (14 Sep 2022 19:13)  Santyl 250 units/g topical ointment: Apply topically to affected area 2 times a day (14 Sep 2022 19:13)  senna oral tablet: 2 tab(s) orally once a day (at bedtime) (14 Sep 2022 19:13)  Synthroid 25 mcg (0.025 mg) oral tablet: 1 tab(s) orally once a day (14 Sep 2022 19:13)  tamsulosin 0.4 mg oral capsule: 2 cap(s) orally once a day (at bedtime) (14 Sep 2022 19:13)        MEDICATIONS  (STANDING):  albuterol/ipratropium for Nebulization 3 milliLiter(s) Nebulizer every 6 hours  aspirin enteric coated 81 milliGRAM(s) Oral daily  atorvastatin 80 milliGRAM(s) Oral at bedtime  buDESOnide    Inhalation Suspension 0.5 milliGRAM(s) Inhalation two times a day  cadexomer iodine 0.9% Gel 1 Application(s) Topical daily  dextrose 5%. 1000 milliLiter(s) (100 mL/Hr) IV Continuous <Continuous>  dextrose 5%. 1000 milliLiter(s) (50 mL/Hr) IV Continuous <Continuous>  dextrose 50% Injectable 25 Gram(s) IV Push once  dextrose 50% Injectable 12.5 Gram(s) IV Push once  dextrose 50% Injectable 25 Gram(s) IV Push once  doxycycline IVPB 100 milliGRAM(s) IV Intermittent every 12 hours  doxycycline IVPB      finasteride 5 milliGRAM(s) Oral daily  furosemide   Injectable 40 milliGRAM(s) IV Push every 12 hours  gabapentin 100 milliGRAM(s) Oral two times a day  glucagon  Injectable 1 milliGRAM(s) IntraMuscular once  guaifenesin/dextromethorphan Oral Liquid 10 milliLiter(s) Oral every 6 hours  insulin glargine Injectable (LANTUS) 26 Unit(s) SubCutaneous at bedtime  insulin lispro (ADMELOG) corrective regimen sliding scale   SubCutaneous three times a day before meals  insulin lispro (ADMELOG) corrective regimen sliding scale   SubCutaneous at bedtime  insulin lispro Injectable (ADMELOG) 24 Unit(s) SubCutaneous three times a day before meals  levothyroxine 25 MICROGram(s) Oral daily  methylPREDNISolone sodium succinate Injectable 20 milliGRAM(s) IV Push every 6 hours  metoprolol tartrate 50 milliGRAM(s) Oral two times a day  montelukast 10 milliGRAM(s) Oral at bedtime  multivitamin/minerals 1 Tablet(s) Oral daily  pantoprazole    Tablet 40 milliGRAM(s) Oral before breakfast  piperacillin/tazobactam IVPB.. 3.375 Gram(s) IV Intermittent every 8 hours  polyethylene glycol 3350 17 Gram(s) Oral daily  sacubitril 24 mG/valsartan 26 mG 1 Tablet(s) Oral two times a day  senna 2 Tablet(s) Oral at bedtime  tamsulosin 0.8 milliGRAM(s) Oral at bedtime        EKG:  RADIOLOGY:  DIAGNOSTIC TESTING:  [ ] Echocardiogram:  [ ] Catherterization:  [ ] Stress Test:  OTHER:     LABS:	 	      09-19    143  |  102  |  56<H>  ----------------------------<  218<H>  3.4<L>   |  29  |  1.44<H>    Ca    8.7      19 Sep 2022 07:11  Phos  3.4     09-18  Mg     1.8     09-19        PT/INR - ( 19 Sep 2022 07:22 )   PT: 42.8 sec;   INR: 3.67 ratio         PTT - ( 19 Sep 2022 07:22 )  PTT:31.0 sec    CARDIAC MARKERS:                  
CARDIOLOGY FOLLOW UP - Dr. Stephens  Date of Service: 9/20/2022  CC: no events    Review of Systems:  Constitutional: No fever, weight loss, or fatigue  Respiratory: No cough, wheezing, or hemoptysis, no shortness of breath  Cardiovascular: No chest pain, palpitations, passing out, dizziness, or leg swelling  Gastrointestinal: No abd or epigastric pain. No nausea, vomiting, or hematemesis; no diarrhea or consiptaiton, no melena or hematochezia  Vascular: No edema     TELEMETRY:    PHYSICAL EXAM:  T(C): 36.4 (09-20-22 @ 11:45), Max: 36.4 (09-19-22 @ 20:40)  HR: 60 (09-20-22 @ 11:45) (60 - 76)  BP: 108/53 (09-20-22 @ 11:45) (108/53 - 130/66)  RR: 18 (09-20-22 @ 11:45) (18 - 18)  SpO2: 97% (09-20-22 @ 11:45) (90% - 100%)  Wt(kg): --  I&O's Summary    19 Sep 2022 07:01  -  20 Sep 2022 07:00  --------------------------------------------------------  IN: 0 mL / OUT: 1800 mL / NET: -1800 mL        Appearance: Normal	  Cardiovascular: Normal S1 S2,RRR, No JVD, No murmurs  Respiratory: Lungs clear to auscultation	  Gastrointestinal:  Soft, Non-tender, + BS	  Extremities: Normal range of motion, No clubbing, cyanosis or edema  Vascular: Peripheral pulses palpable 2+ bilaterally       Home Medications:  albuterol 2.5 mg/3 mL (0.083%) inhalation solution: 3 milliliter(s) inhaled every 6 hours (14 Sep 2022 19:13)  aspirin 81 mg oral delayed release tablet: 1 tab(s) orally once a day (14 Sep 2022 19:13)  bacitracin 500 units/g topical ointment: Apply topically to affected area 2 times a day (14 Sep 2022 19:13)  budesonide 0.5 mg/2 mL inhalation suspension: 0.5 milligram(s) inhaled 2 times a day (14 Sep 2022 19:13)  Coumadin 5 mg oral tablet: 1 tab(s) orally once a day (at bedtime) (14 Sep 2022 19:13)  Crestor 10 mg oral tablet: 1 tab(s) orally once a day (at bedtime) (14 Sep 2022 19:13)  finasteride 5 mg oral tablet: 1 tab(s) orally once a day (14 Sep 2022 19:13)  furosemide 20 mg oral tablet: 1 tab(s) orally once a day (14 Sep 2022 19:13)  gabapentin 100 mg oral tablet: 1 tab(s) orally 2 times a day (14 Sep 2022 19:13)  glycopyrrolate 1 mg oral tablet: 1 tab(s) orally 2 times a day (14 Sep 2022 19:13)  insulin lispro 100 units/mL injectable solution: 3 unit(s) injectable 3 times a day (before meals) (14 Sep 2022 19:13)  ipratropium 500 mcg/2.5 mL inhalation solution: 2.5 milliliter(s) inhaled 4 times a day (14 Sep 2022 19:13)  Levemir FlexTouch 100 units/mL subcutaneous solution: 30 unit(s) subcutaneous once a day (at bedtime) (14 Sep 2022 19:13)  losartan 100 mg oral tablet: 1 tab(s) orally once a day (14 Sep 2022 19:13)  metoprolol succinate 25 mg oral tablet, extended release: 1 tab(s) orally once a day (14 Sep 2022 19:13)  montelukast 10 mg oral tablet: 1 tab(s) orally once a day (14 Sep 2022 19:13)  Multiple Vitamins with Minerals oral tablet: 1 tab(s) orally once a day (14 Sep 2022 19:13)  pantoprazole 40 mg oral delayed release tablet: 1 tab(s) orally once a day (14 Sep 2022 19:13)  polyethylene glycol 3350 oral powder for reconstitution: 17 gram(s) orally once a day (14 Sep 2022 19:13)  Santyl 250 units/g topical ointment: Apply topically to affected area 2 times a day (14 Sep 2022 19:13)  senna oral tablet: 2 tab(s) orally once a day (at bedtime) (14 Sep 2022 19:13)  Synthroid 25 mcg (0.025 mg) oral tablet: 1 tab(s) orally once a day (14 Sep 2022 19:13)  tamsulosin 0.4 mg oral capsule: 2 cap(s) orally once a day (at bedtime) (14 Sep 2022 19:13)        MEDICATIONS  (STANDING):  albuterol/ipratropium for Nebulization 3 milliLiter(s) Nebulizer every 6 hours  aspirin enteric coated 81 milliGRAM(s) Oral daily  atorvastatin 80 milliGRAM(s) Oral at bedtime  buDESOnide    Inhalation Suspension 0.5 milliGRAM(s) Inhalation two times a day  cadexomer iodine 0.9% Gel 1 Application(s) Topical daily  dextrose 5%. 1000 milliLiter(s) (100 mL/Hr) IV Continuous <Continuous>  dextrose 5%. 1000 milliLiter(s) (50 mL/Hr) IV Continuous <Continuous>  dextrose 50% Injectable 25 Gram(s) IV Push once  dextrose 50% Injectable 12.5 Gram(s) IV Push once  dextrose 50% Injectable 25 Gram(s) IV Push once  doxycycline IVPB 100 milliGRAM(s) IV Intermittent every 12 hours  doxycycline IVPB      finasteride 5 milliGRAM(s) Oral daily  furosemide   Injectable 40 milliGRAM(s) IV Push every 12 hours  gabapentin 100 milliGRAM(s) Oral two times a day  glucagon  Injectable 1 milliGRAM(s) IntraMuscular once  guaifenesin/dextromethorphan Oral Liquid 10 milliLiter(s) Oral every 6 hours  insulin glargine Injectable (LANTUS) 26 Unit(s) SubCutaneous at bedtime  insulin lispro (ADMELOG) corrective regimen sliding scale   SubCutaneous three times a day before meals  insulin lispro (ADMELOG) corrective regimen sliding scale   SubCutaneous at bedtime  insulin lispro Injectable (ADMELOG) 20 Unit(s) SubCutaneous three times a day before meals  levothyroxine 25 MICROGram(s) Oral daily  metoprolol tartrate 50 milliGRAM(s) Oral two times a day  montelukast 10 milliGRAM(s) Oral at bedtime  multivitamin/minerals 1 Tablet(s) Oral daily  pantoprazole    Tablet 40 milliGRAM(s) Oral before breakfast  piperacillin/tazobactam IVPB.. 3.375 Gram(s) IV Intermittent every 8 hours  polyethylene glycol 3350 17 Gram(s) Oral daily  predniSONE   Tablet 50 milliGRAM(s) Oral daily  sacubitril 24 mG/valsartan 26 mG 1 Tablet(s) Oral two times a day  senna 2 Tablet(s) Oral at bedtime  tamsulosin 0.8 milliGRAM(s) Oral at bedtime        EKG:  RADIOLOGY:  DIAGNOSTIC TESTING:  [ ] Echocardiogram:  [ ] Catherterization:  [ ] Stress Test:  OTHER:     LABS:	 	      09-20    145  |  105  |  63<H>  ----------------------------<  41<LL>  3.4<L>   |  27  |  1.48<H>    Ca    8.5      20 Sep 2022 07:28  Mg     1.8     09-19        PT/INR - ( 20 Sep 2022 07:28 )   PT: 53.5 sec;   INR: 4.58 ratio         PTT - ( 20 Sep 2022 07:28 )  PTT:36.7 sec    CARDIAC MARKERS:                  
CARDIOLOGY FOLLOW UP - Dr. Stephens  Date of Service: 9/21/2022  CC: no events    Review of Systems:  Constitutional: No fever, weight loss, or fatigue  Respiratory: No cough, wheezing, or hemoptysis, no shortness of breath  Cardiovascular: No chest pain, palpitations, passing out, dizziness, or leg swelling  Gastrointestinal: No abd or epigastric pain. No nausea, vomiting, or hematemesis; no diarrhea or consiptaiton, no melena or hematochezia  Vascular: No edema     TELEMETRY:    PHYSICAL EXAM:  T(C): 36.4 (09-21-22 @ 11:54), Max: 36.4 (09-20-22 @ 21:07)  HR: 59 (09-21-22 @ 11:54) (58 - 60)  BP: 101/54 (09-21-22 @ 11:54) (101/54 - 122/62)  RR: 18 (09-21-22 @ 11:54) (18 - 18)  SpO2: 99% (09-21-22 @ 11:54) (96% - 99%)  Wt(kg): --  I&O's Summary    20 Sep 2022 07:01  -  21 Sep 2022 07:00  --------------------------------------------------------  IN: 920 mL / OUT: 1300 mL / NET: -380 mL    21 Sep 2022 07:01  -  21 Sep 2022 13:27  --------------------------------------------------------  IN: 240 mL / OUT: 0 mL / NET: 240 mL        Appearance: Normal	  Cardiovascular: Normal S1 S2,RRR, No JVD, No murmurs  Respiratory: Lungs clear to auscultation	  Gastrointestinal:  Soft, Non-tender, + BS	  Extremities: Normal range of motion, No clubbing, cyanosis or edema  Vascular: Peripheral pulses palpable 2+ bilaterally       Home Medications:  albuterol 2.5 mg/3 mL (0.083%) inhalation solution: 3 milliliter(s) inhaled every 6 hours (14 Sep 2022 19:13)  aspirin 81 mg oral delayed release tablet: 1 tab(s) orally once a day (14 Sep 2022 19:13)  bacitracin 500 units/g topical ointment: Apply topically to affected area 2 times a day (14 Sep 2022 19:13)  budesonide 0.5 mg/2 mL inhalation suspension: 0.5 milligram(s) inhaled 2 times a day (14 Sep 2022 19:13)  Coumadin 5 mg oral tablet: 1 tab(s) orally once a day (at bedtime) (14 Sep 2022 19:13)  Crestor 10 mg oral tablet: 1 tab(s) orally once a day (at bedtime) (14 Sep 2022 19:13)  finasteride 5 mg oral tablet: 1 tab(s) orally once a day (14 Sep 2022 19:13)  furosemide 20 mg oral tablet: 1 tab(s) orally once a day (14 Sep 2022 19:13)  gabapentin 100 mg oral tablet: 1 tab(s) orally 2 times a day (14 Sep 2022 19:13)  glycopyrrolate 1 mg oral tablet: 1 tab(s) orally 2 times a day (14 Sep 2022 19:13)  insulin lispro 100 units/mL injectable solution: 3 unit(s) injectable 3 times a day (before meals) (14 Sep 2022 19:13)  ipratropium 500 mcg/2.5 mL inhalation solution: 2.5 milliliter(s) inhaled 4 times a day (14 Sep 2022 19:13)  Levemir FlexTouch 100 units/mL subcutaneous solution: 30 unit(s) subcutaneous once a day (at bedtime) (14 Sep 2022 19:13)  losartan 100 mg oral tablet: 1 tab(s) orally once a day (14 Sep 2022 19:13)  metoprolol succinate 25 mg oral tablet, extended release: 1 tab(s) orally once a day (14 Sep 2022 19:13)  montelukast 10 mg oral tablet: 1 tab(s) orally once a day (14 Sep 2022 19:13)  Multiple Vitamins with Minerals oral tablet: 1 tab(s) orally once a day (14 Sep 2022 19:13)  pantoprazole 40 mg oral delayed release tablet: 1 tab(s) orally once a day (14 Sep 2022 19:13)  polyethylene glycol 3350 oral powder for reconstitution: 17 gram(s) orally once a day (14 Sep 2022 19:13)  Santyl 250 units/g topical ointment: Apply topically to affected area 2 times a day (14 Sep 2022 19:13)  senna oral tablet: 2 tab(s) orally once a day (at bedtime) (14 Sep 2022 19:13)  Synthroid 25 mcg (0.025 mg) oral tablet: 1 tab(s) orally once a day (14 Sep 2022 19:13)  tamsulosin 0.4 mg oral capsule: 2 cap(s) orally once a day (at bedtime) (14 Sep 2022 19:13)        MEDICATIONS  (STANDING):  acetylcysteine 20%  Inhalation 4 milliLiter(s) Inhalation three times a day  albuterol/ipratropium for Nebulization 3 milliLiter(s) Nebulizer every 6 hours  aspirin enteric coated 81 milliGRAM(s) Oral daily  atorvastatin 80 milliGRAM(s) Oral at bedtime  buDESOnide    Inhalation Suspension 0.5 milliGRAM(s) Inhalation two times a day  cadexomer iodine 0.9% Gel 1 Application(s) Topical daily  dextrose 5%. 1000 milliLiter(s) (100 mL/Hr) IV Continuous <Continuous>  dextrose 5%. 1000 milliLiter(s) (50 mL/Hr) IV Continuous <Continuous>  dextrose 50% Injectable 25 Gram(s) IV Push once  dextrose 50% Injectable 12.5 Gram(s) IV Push once  dextrose 50% Injectable 25 Gram(s) IV Push once  finasteride 5 milliGRAM(s) Oral daily  furosemide   Injectable 40 milliGRAM(s) IV Push every 12 hours  gabapentin 100 milliGRAM(s) Oral two times a day  glucagon  Injectable 1 milliGRAM(s) IntraMuscular once  guaifenesin/dextromethorphan Oral Liquid 10 milliLiter(s) Oral every 6 hours  insulin glargine Injectable (LANTUS) 20 Unit(s) SubCutaneous at bedtime  insulin lispro (ADMELOG) corrective regimen sliding scale   SubCutaneous at bedtime  insulin lispro (ADMELOG) corrective regimen sliding scale   SubCutaneous three times a day before meals  insulin lispro Injectable (ADMELOG) 15 Unit(s) SubCutaneous three times a day before meals  levothyroxine 25 MICROGram(s) Oral daily  metoprolol tartrate 50 milliGRAM(s) Oral two times a day  montelukast 10 milliGRAM(s) Oral at bedtime  multivitamin/minerals 1 Tablet(s) Oral daily  pantoprazole    Tablet 40 milliGRAM(s) Oral before breakfast  polyethylene glycol 3350 17 Gram(s) Oral daily  predniSONE   Tablet 50 milliGRAM(s) Oral daily  sacubitril 24 mG/valsartan 26 mG 1 Tablet(s) Oral two times a day  senna 2 Tablet(s) Oral at bedtime  sodium chloride 3%  Inhalation 4 milliLiter(s) Inhalation every 12 hours  tamsulosin 0.8 milliGRAM(s) Oral at bedtime        EKG:  RADIOLOGY:  DIAGNOSTIC TESTING:  [ ] Echocardiogram:  [ ] Catherterization:  [ ] Stress Test:  OTHER:     LABS:	 	                          9.8    10.57 )-----------( 324      ( 21 Sep 2022 07:36 )             34.5     09-21    142  |  103  |  80<H>  ----------------------------<  58<L>  3.5   |  28  |  1.73<H>    Ca    8.4      21 Sep 2022 07:40  Mg     1.8     09-21        PT/INR - ( 21 Sep 2022 07:38 )   PT: 37.8 sec;   INR: 3.22 ratio         PTT - ( 21 Sep 2022 07:38 )  PTT:35.4 sec    CARDIAC MARKERS:                  
DIABETES FOLLOW UP NOTE: Saw pt earlier today    Chief Complaint: Endocrine consult requested for management of T2DM    INTERVAL HX: Pt stable, reports tolerating POs and eating >50% of his meals with BG levels tightly controlled. Noted FBG per BMP 41 yesterday and 58 today. Pt denies s/sx of hypoglycemia. Denies any CP/SOB at time of visit> reports feeling better. However, pt slow to answer > has trouble understanding questions. Pt unable to recall DM meds he takes at home and states his wife does everything for him at home.  On Prednisone 50mg/day now.     Review of Systems:  General: As above  Cardiovascular: No chest pain, palpitations  Respiratory: No SOB, no cough  GI: No nausea, vomiting, abdominal pain  Endocrine: No S&Sx of hypoglycemia    Allergies    No Known Allergies    Intolerances      MEDICATIONS:  atorvastatin 80 milliGRAM(s) Oral at bedtime  finasteride 5 milliGRAM(s) Oral daily  insulin glargine Injectable (LANTUS) 20 Unit(s) SubCutaneous at bedtime  insulin lispro (ADMELOG) corrective regimen sliding scale   SubCutaneous three times a day before meals  insulin lispro (ADMELOG) corrective regimen sliding scale   SubCutaneous at bedtime  insulin lispro Injectable (ADMELOG) 15 Unit(s) SubCutaneous three times a day before meals  levothyroxine 25 MICROGram(s) Oral daily  predniSONE   Tablet 50 milliGRAM(s) Oral daily    PHYSICAL EXAM:  VITALS: T(C): 36.4 (09-21-22 @ 11:54)  T(F): 97.5 (09-21-22 @ 11:54), Max: 97.5 (09-20-22 @ 21:07)  HR: 59 (09-21-22 @ 11:54) (59 - 60)  BP: 101/54 (09-21-22 @ 11:54) (101/54 - 122/62)  RR:  (18 - 18)  SpO2:  (98% - 99%)  Wt(kg): --  GENERAL: Male laying in bed in NAD  Abdomen: Soft, nontender, non distended, central adiposity  Extremities: Warm, no edema in all 4 exts with chronic vascular changes in LLE> darker skin discoloration and dryness. R BKA healed intact  NEURO: A&O X3    LABS:  POCT Blood Glucose.: 152 mg/dL (09-21-22 @ 12:27)  POCT Blood Glucose.: 115 mg/dL (09-21-22 @ 08:41)  POCT Blood Glucose.: 102 mg/dL (09-20-22 @ 21:55)  POCT Blood Glucose.: 130 mg/dL (09-20-22 @ 17:29)  POCT Blood Glucose.: 101 mg/dL (09-20-22 @ 12:58)  POCT Blood Glucose.: 73 mg/dL (09-20-22 @ 09:04)  POCT Blood Glucose.: 116 mg/dL (09-19-22 @ 22:28)  POCT Blood Glucose.: 165 mg/dL (09-19-22 @ 17:46)  POCT Blood Glucose.: 337 mg/dL (09-19-22 @ 12:47)  POCT Blood Glucose.: 215 mg/dL (09-19-22 @ 08:36)  POCT Blood Glucose.: 212 mg/dL (09-18-22 @ 22:09)  POCT Blood Glucose.: 176 mg/dL (09-18-22 @ 17:44)                            9.8    10.57 )-----------( 324      ( 21 Sep 2022 07:36 )             34.5       09-21    142  |  103  |  80<H>  ----------------------------<  58<L>  3.5   |  28  |  1.73<H>    eGFR: 38<L>    Ca    8.4      09-21  Mg     1.8     09-2      Thyroid Function Tests:  09-16 @ 05:26 TSH 1.71 FreeT4 0.9 T3 -- Anti TPO -- Anti Thyroglobulin Ab -- TSI --      A1C with Estimated Average Glucose Result: 9.1 % (09-15-22 @ 07:22)      Estimated Average Glucose: 214 mg/dL (09-15-22 @ 07:22)        09-15 Chol 91 Direct LDL -- LDL calculated 44 HDL 26<L> Trig 107              
[facility] - ORESTES Wall / ORESTES Wynne / KARIE Melendez/ ORESTES Knight/ ORESTES Jang/ ADRIA Lee / TOÑITO Mora / ROBB Kapoor  ---------------------------------------------------------------------------------------------------------------  seen and examined today for KARLOS on CKD  Interval : serum creatinine rising  VITALS:  T(F): 97.3 (09-22-22 @ 12:31), Max: 97.8 (09-22-22 @ 04:14)  HR: 58 (09-22-22 @ 12:31)  BP: 102/62 (09-22-22 @ 12:31)  RR: 18 (09-22-22 @ 12:31)  SpO2: 97% (09-22-22 @ 12:31)  Wt(kg): --    09-21 @ 07:01  -  09-22 @ 07:00  --------------------------------------------------------  IN: 410 mL / OUT: 1100 mL / NET: -690 mL      Physical Exam :-  Constitutional: NAD  Neck: Supple.  Respiratory: Bilateral equal breath sounds,  Cardiovascular: S1, S2 normal,  Gastrointestinal: Bowel Sounds present, soft, non tender.  Extremities: RLE BKA, LLE edema 2+  Neurological: Alert and Oriented  Psychiatric: Normal mood, normal affect  Data:-  Allergies :   No Known Allergies    Hospital Medications:   MEDICATIONS  (STANDING):  acetylcysteine 20%  Inhalation 4 milliLiter(s) Inhalation three times a day  albuterol/ipratropium for Nebulization 3 milliLiter(s) Nebulizer every 6 hours  aspirin enteric coated 81 milliGRAM(s) Oral daily  atorvastatin 80 milliGRAM(s) Oral at bedtime  buDESOnide    Inhalation Suspension 0.5 milliGRAM(s) Inhalation two times a day  cadexomer iodine 0.9% Gel 1 Application(s) Topical daily  dextrose 5%. 1000 milliLiter(s) (50 mL/Hr) IV Continuous <Continuous>  dextrose 5%. 1000 milliLiter(s) (100 mL/Hr) IV Continuous <Continuous>  dextrose 50% Injectable 25 Gram(s) IV Push once  dextrose 50% Injectable 12.5 Gram(s) IV Push once  dextrose 50% Injectable 25 Gram(s) IV Push once  finasteride 5 milliGRAM(s) Oral daily  gabapentin 100 milliGRAM(s) Oral two times a day  glucagon  Injectable 1 milliGRAM(s) IntraMuscular once  guaifenesin/dextromethorphan Oral Liquid 10 milliLiter(s) Oral every 6 hours  insulin glargine Injectable (LANTUS) 10 Unit(s) SubCutaneous at bedtime  insulin lispro (ADMELOG) corrective regimen sliding scale   SubCutaneous three times a day before meals  insulin lispro (ADMELOG) corrective regimen sliding scale   SubCutaneous at bedtime  insulin lispro Injectable (ADMELOG) 12 Unit(s) SubCutaneous before dinner  insulin lispro Injectable (ADMELOG) 12 Unit(s) SubCutaneous before lunch  levothyroxine 25 MICROGram(s) Oral daily  metoprolol tartrate 50 milliGRAM(s) Oral two times a day  montelukast 10 milliGRAM(s) Oral at bedtime  multivitamin/minerals 1 Tablet(s) Oral daily  pantoprazole    Tablet 40 milliGRAM(s) Oral before breakfast  polyethylene glycol 3350 17 Gram(s) Oral daily  potassium chloride    Tablet ER 40 milliEquivalent(s) Oral once  predniSONE   Tablet 50 milliGRAM(s) Oral daily  senna 2 Tablet(s) Oral at bedtime  sodium chloride 3%  Inhalation 4 milliLiter(s) Inhalation every 12 hours  tamsulosin 0.8 milliGRAM(s) Oral at bedtime    09-22    143  |  104  |  84<H>  ----------------------------<  51<LL>  3.2<L>   |  27  |  1.85<H>    Ca    8.1<L>      22 Sep 2022 07:30  Mg     1.8     09-21      Creatinine Trend: 1.85 <--, 1.73 <--, 1.48 <--, 1.44 <--, 1.56 <--, 1.76 <--                        9.8    10.57 )-----------( 324      ( 21 Sep 2022 07:36 )             34.5   
[facility] - [facility] - PROGRESS NOTE    CHIEF COMPLAINT: hypoxemia; chronic hypercapnic respiratory failure; COPD exacerbation; emphysema; acute on chronic systolic heart failure; bilateral pleural effusions; atelectasis; pneumonia    INTERVAL HISTORY: weak and frail; shortness of breath is much improved following a large volume left sided thoracentesis but still remains on a 3lpm nasal canula; decreased cough with scant sputum production; improved chest congestion and wheeze; no fevers, chills or sweats; no chest pain/pressure or palpitations; resolved discomfort at his right BKA stump and left heel; 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; out of bed and into the chair using a chelsy lift yesterday    REVIEW OF SYSTEMS:  Constitutional: As per interval history  HEENT: Within normal limits  CV: As per interval history  Resp: As per interval history  GI: dysphagia  : Within normal limits  Musculoskeletal: Within normal limits  Skin: Within normal limits  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 - left facial droop - dysphagia   Psychiatric: Within normal limits  Endocrine: diabetes  Hematologic/Lymphatic: Within normal limits  Allergic/Immunologic: Within normal limits      MEDICATIONS:     Pulmonary "  albuterol/ipratropium for Nebulization 3 milliLiter(s) Nebulizer every 6 hours  buDESOnide    Inhalation Suspension 0.5 milliGRAM(s) Inhalation two times a day  guaifenesin/dextromethorphan Oral Liquid 10 milliLiter(s) Oral every 6 hours  montelukast 10 milliGRAM(s) Oral at bedtime    Anti-microbials:  doxycycline IVPB 100 milliGRAM(s) IV Intermittent every 12 hours  doxycycline IVPB      piperacillin/tazobactam IVPB.. 3.375 Gram(s) IV Intermittent every 8 hours    Cardiovascular:  furosemide   Injectable 40 milliGRAM(s) IV Push every 12 hours  metoprolol tartrate 50 milliGRAM(s) Oral two times a day  sacubitril 24 mG/valsartan 26 mG 1 Tablet(s) Oral two times a day  tamsulosin 0.8 milliGRAM(s) Oral at bedtime    Other:  aspirin enteric coated 81 milliGRAM(s) Oral daily  atorvastatin 80 milliGRAM(s) Oral at bedtime  cadexomer iodine 0.9% Gel 1 Application(s) Topical daily  dextrose 5%. 1000 milliLiter(s) IV Continuous <Continuous>  dextrose 5%. 1000 milliLiter(s) IV Continuous <Continuous>  dextrose 50% Injectable 25 Gram(s) IV Push once  dextrose 50% Injectable 12.5 Gram(s) IV Push once  dextrose 50% Injectable 25 Gram(s) IV Push once  finasteride 5 milliGRAM(s) Oral daily  gabapentin 100 milliGRAM(s) Oral two times a day  glucagon  Injectable 1 milliGRAM(s) IntraMuscular once  insulin glargine Injectable (LANTUS) 26 Unit(s) SubCutaneous at bedtime  insulin lispro (ADMELOG) corrective regimen sliding scale   SubCutaneous three times a day before meals  insulin lispro (ADMELOG) corrective regimen sliding scale   SubCutaneous at bedtime  insulin lispro Injectable (ADMELOG) 24 Unit(s) SubCutaneous three times a day before meals  levothyroxine 25 MICROGram(s) Oral daily  methylPREDNISolone sodium succinate Injectable 20 milliGRAM(s) IV Push every 6 hours  multivitamin/minerals 1 Tablet(s) Oral daily  pantoprazole    Tablet 40 milliGRAM(s) Oral before breakfast  polyethylene glycol 3350 17 Gram(s) Oral daily  senna 2 Tablet(s) Oral at bedtime    MEDICATIONS  (PRN):  acetaminophen     Tablet .. 650 milliGRAM(s) Oral every 6 hours PRN Moderate Pain (4 - 6)  dextrose Oral Gel 15 Gram(s) Oral once PRN Blood Glucose LESS THAN 70 milliGRAM(s)/deciliter      OBJECTIVE:    POCT Blood Glucose.: 116 mg/dL (19 Sep 2022 22:28)  POCT Blood Glucose.: 165 mg/dL (19 Sep 2022 17:46)  POCT Blood Glucose.: 337 mg/dL (19 Sep 2022 12:47)  POCT Blood Glucose.: 215 mg/dL (19 Sep 2022 08:36)      PHYSICAL EXAM:       ICU Vital Signs Last 24 Hrs  T(C): 36.3 (20 Sep 2022 04:02), Max: 36.4 (19 Sep 2022 11:57)  T(F): 97.3 (20 Sep 2022 04:02), Max: 97.6 (19 Sep 2022 11:57)  HR: 68 (20 Sep 2022 04:02) (60 - 76)  BP: 115/62 (20 Sep 2022 04:49) (111/58 - 130/66)  BP(mean): 77 (19 Sep 2022 11:57) (77 - 77)  ABP: --  ABP(mean): --  RR: 18 (20 Sep 2022 04:02) (18 - 18)  SpO2: 96% (20 Sep 2022 04:49) (90% - 100%) on 3lpm nasal canula    General: Awake. Alert. Cooperative. No distress. Appears stated age. Obese.   HEENT: Atraumatic. Normocephalic. Anicteric. Normal oral mucosa. PERRL. EOMI.  Neck: Supple. Trachea midline. Thyroid without enlargement/tenderness/nodules. No carotid bruit. No JVD.	  Cardiovascular: Regular rate and rhythm. Distant S1 S2. No murmurs, rubs or gallops.  Respiratory: Respirations unlabored. Decreased bilateral rhonchi and wheeze. Improved breath sounds left hemithorax. Decreased breath sounds at the bases with dullness to percussion. No curvature.  Abdomen: Soft. Non-tender. Non-distended. No organomegaly. No masses. Normal bowel sounds. 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 Left facial droop. A and O x 3  Psychiatry: Appropriate mood and affect.    LABS:      CBC    WBC  7.73 <==, 9.40 <==    Hemoglobin  8.5 <<==, 7.7 <<==    Hematocrit  30.1 <==, 27.5 <==    Platelets  293 <==, 293 <==      143  |  102  |  56<H>  ----------------------------<  218<H>    09-19  3.4<L>   |  29  |  1.44<H>      LYTES    sodium  143 <==, 142 <==, 138 <==, 140 <==, 143 <==    potassium   3.4 <==, 3.1 <==, 3.5 <==, 4.1 <==, 4.0 <==    chloride  102 <==, 103 <==, 100 <==, 102 <==, 104 <==    carbon dioxide  29 <==, 25 <==, 29 <==, 27 <==, 31 <==    =============================================================================================  RENAL FUNCTION:    Creatinine:   1.44  <<==, 1.56  <<==, 1.76  <<==, 0.95  <<==, 0.89  <<==    BUN:   56 <==, 56 <==, 49 <==, 21 <==, 22 <==    ============================================================================================    calcium   8.7 <==, 8.6 <==, 8.3 <==, 8.7 <==, 8.2 <==    phos   3.4 <==    mag   1.8 <==, 1.8 <==, 1.9 <==, 1.8 <==    ============================================================================================  LFTs    AST:   12 <==     ALT:  11  <==     AP:  79  <=    Bili:  0.3  <=    PT/INR - ( 19 Sep 2022 07:22 )   PT: 42.8 sec;   INR: 3.67 ratio      PTT - ( 19 Sep 2022 07:22 )  PTT:31.0 sec    Venous Blood Gas:  09-14 @ 13:10  7.38/57/31/34/45.9  VBG Lactate: 1.2    Serum Pro-Brain Natriuretic Peptide: 2388 pg/mL (09-14 @ 13:36)    CARDIAC MARKERS ( 15 Sep 2022 07:22 )  CPK x     /CKMB x     /CKMB Units x        troponin 165 ng/L    CARDIAC MARKERS ( 14 Sep 2022 16:27 )  CPK x     /CKMB x     /CKMB Units x        troponin 145 ng/L    CARDIAC MARKERS ( 14 Sep 2022 13:36 )  CPK x     /CKMB x     /CKMB Units x        troponin 154 ng/L    < from: TTE with Doppler (w/Cont) (09.15.22 @ 12:59) >    Patient name: Martir Heart  YOB: 1935   Age: 87 (M)   MR#: [id number]  Study Date: 9/15/2022  ------------------------------------------------------------------------  Dimensions:    Normal Values:  LA:     3.5    2.0 - 4.0 cm  Ao:     3.6    2.0 - 3.8 cm  SEPTUM: 1.0    0.6 - 1.2 cm  PWT:    1.0    0.6 - 1.1 cm  LVIDd:  4.3    3.0 - 5.6 cm  LVIDs:  3.4    1.8 - 4.0 cm  Derived variables:  LVMI: 78 g/m2  RWT: 0.50  Fractional short: 21 %  EF (Visual Estimate): 35-40 %  Doppler Peak Velocity (m/sec): AoV=1.6  ---------------------------------------------------------------------------------------------------------------  Conclusions:  1. Increased relative wall thickness with normal left  ventricular mass index, consistent with concentric left  ventricular remodeling.  2. Endocardial visualization enhanced with intravenous  injection of Ultrasonic Enhancing Agent (Lumason). Severe  segmental left ventricular systolic dysfunction. The mid to  distal anteroseptum and severely hypokinetic. The mid to  basal inferolateral wall is hypokinetic.  3. Increased E/e'  is consistent with elevated left  ventricular filling pressure.  4. Normal right ventricular size and function.  *** Compared with echocardiogram of 4/3/2022, no  significant changes noted.  ------------------------------------------------------------------------  Confirmed on  9/15/2022 - 16:03:54 by Zain Tucker M.D.  ------------------------------------------------------------------------  ---------------------------------------------------------------------------------------------------------------    MICROBIOLOGY:     Respiratory Viral Panel with COVID-19 by TANJA (09.14.22 @ 13:36)   Rapid RVP Result: [facility]   SARS-CoV-2: [facility]  This Respiratory Panel uses polymerase chain reaction (PCR) to detect for   adenovirus; coronavirus (HKU1, NL63, 229E, OC43); human metapneumovirus   (hMPV); human enterovirus/rhinovirus (Entero/RV); influenza A; influenza   A/H1; influenza A/H3; influenza A/H1-2009; influenza B; parainfluenza   viruses 1, 2, 3, 4; respiratory syncytial virus; Mycoplasma pneumoniae;   Chlamydophila pneumoniae; and SARS-CoV-2.         RADIOLOGY:  [x] Chest radiographs reviewed and interpreted by me    EXAM:  XR CHEST AP OR PA 1V                          PROCEDURE DATE:  09/15/2022      Frontal expiratory view of the chest shows the heart to be similar in   size. Left cardiac pacemaker is again noted.    The lungs show less right atelectasis with similar left effusion and   there is no evidence of pneumothorax nor right pleural effusion.    IMPRESSION:  No pneumothorax.    HORACIO HELMS MD; Attending Interventional Radiologist  This document has been electronically signed. Sep 16 2022 11:18AM  ---------------------------------------------------------------------------------------------------------------  EXAM:  XR CHEST PORTABLE URGENT 1V                          PROCEDURE DATE:  09/14/2022      FINDINGS:    Left chest wall pacemaker.  The heart size cannot be assessed on this projection.  Small left pleural effusion with associated atelectasis, mildly increased   since 4/18/2022. The right lung is clear. No pneumothorax.    IMPRESSION:  Small left pleural effusion mildly increased since 4/18/2022.    YAN DELGADO MD; Resident Radiology  This document has been electronically signed.  SATURNINO CHERRY MD; Attending Radiologist  This document has been electronically signed. Sep 14 2022  2:07PM    ---------------------------------------------------------------------------------------------------------------  EXAM:  CT ANGIO CHEST PUL ABHIJEET REMY                          PROCEDURE DATE:  09/14/2022      FINDINGS:    LUNGS AND AIRWAYS/PLEURA: Patent central airways.  Centrilobular   emphysema. Moderate left and small right pleural effusions with   associated partial right and complete left lower lobe compressive   atelectasis. There are patchy and nodular areas of consolidation   throughout the aerated portions of the right lung dependently, new since   May 2022 (i.e. 2:54 in the posterior right upper lobe). Other nodular   areas of consolidation in the posterior left upper lobe persist although   have improved since May 2022 (2:38).    MEDIASTINUM AND MARK: No lymphadenopathy.    VESSELS: Calcifications of the aorta and coronary arteries. No main,   right, left, lobar or segmental pulmonary embolus. Limited evaluation of   the subsegmental branches. And great vessels are normal in size.    HEART: Left chest wall dual chamber pacemaker with right atrial and right   ventricular leads. Heart size is normal. No pericardial effusion.    VISUALIZED UPPER ABDOMEN: Cholelithiasis.    BONES: Remote left posterior rib fractures.    IMPRESSION:  No pulmonary embolism.    Moderate left and small right pleural effusions, increased since May   2022, with worsening lower lobe compressive atelectasis.    New right lung patchy and nodular areas of consolidation, likely   infectious/inflammatory.    JAQUAN LARA MD; Resident Radiologist  This document has been electronically signed.  MELANY MCNAIR M.D., Attending Radiologist  This document has been electronically signed. Sep 14 2022  5:06PM  ---------------------------------------------------------------------------------------------------------------        
[facility] - PROGRESS NOTE    CHIEF COMPLAINT: hypoxemia; chronic hypercapnic respiratory failure; COPD exacerbation; emphysema; acute on chronic systolic heart failure; bilateral pleural effusions; atelectasis; pneumonia    INTERVAL HISTORY: stronger; no shortness of breath or hypoxemia on a 2lpm nasal canula following a large volume left sided thoracentesis; persistent cough but is unable to expectorate sputum; resolved chest congestion and wheeze; no fevers, chills or sweats; no chest pain/pressure or palpitations; 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 making sleep difficult; 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; not out of bed and into the chair yesterday; good appetite    REVIEW OF SYSTEMS:  Constitutional: As per interval history  HEENT: Within normal limits  CV: As per interval history  Resp: As per interval history  GI: dysphagia  : Within normal limits  Musculoskeletal: Within normal limits  Skin: Within normal limits  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 - left facial droop - dysphagia   Psychiatric: Within normal limits  Endocrine: diabetes  Hematologic/Lymphatic: Within normal limits  Allergic/Immunologic: Within normal limits    MEDICATIONS:     Pulmonary "  acetylcysteine 20%  Inhalation 4 milliLiter(s) Inhalation three times a day  albuterol/ipratropium for Nebulization 3 milliLiter(s) Nebulizer every 6 hours  buDESOnide    Inhalation Suspension 0.5 milliGRAM(s) Inhalation two times a day  guaifenesin/dextromethorphan Oral Liquid 10 milliLiter(s) Oral every 6 hours  montelukast 10 milliGRAM(s) Oral at bedtime  sodium chloride 3%  Inhalation 4 milliLiter(s) Inhalation every 12 hours    Anti-microbials:    Cardiovascular:  metoprolol tartrate 50 milliGRAM(s) Oral two times a day  sacubitril 24 mG/valsartan 26 mG 1 Tablet(s) Oral two times a day  tamsulosin 0.8 milliGRAM(s) Oral at bedtime    Other:  aspirin enteric coated 81 milliGRAM(s) Oral daily  atorvastatin 80 milliGRAM(s) Oral at bedtime  cadexomer iodine 0.9% Gel 1 Application(s) Topical daily  dextrose 5%. 1000 milliLiter(s) IV Continuous <Continuous>  dextrose 5%. 1000 milliLiter(s) IV Continuous <Continuous>  dextrose 50% Injectable 25 Gram(s) IV Push once  dextrose 50% Injectable 12.5 Gram(s) IV Push once  dextrose 50% Injectable 25 Gram(s) IV Push once  finasteride 5 milliGRAM(s) Oral daily  gabapentin 100 milliGRAM(s) Oral two times a day  glucagon  Injectable 1 milliGRAM(s) IntraMuscular once  insulin glargine Injectable (LANTUS) 20 Unit(s) SubCutaneous at bedtime  insulin lispro (ADMELOG) corrective regimen sliding scale   SubCutaneous at bedtime  insulin lispro (ADMELOG) corrective regimen sliding scale   SubCutaneous three times a day before meals  insulin lispro Injectable (ADMELOG) 20 Unit(s) SubCutaneous before breakfast  insulin lispro Injectable (ADMELOG) 15 Unit(s) SubCutaneous before lunch  insulin lispro Injectable (ADMELOG) 15 Unit(s) SubCutaneous before dinner  levothyroxine 25 MICROGram(s) Oral daily  multivitamin/minerals 1 Tablet(s) Oral daily  pantoprazole    Tablet 40 milliGRAM(s) Oral before breakfast  polyethylene glycol 3350 17 Gram(s) Oral daily  predniSONE   Tablet 50 milliGRAM(s) Oral daily  senna 2 Tablet(s) Oral at bedtime    MEDICATIONS  (PRN):  acetaminophen     Tablet .. 650 milliGRAM(s) Oral every 6 hours PRN Moderate Pain (4 - 6)  dextrose Oral Gel 15 Gram(s) Oral once PRN Blood Glucose LESS THAN 70 milliGRAM(s)/deciliter      OBJECTIVE:    POCT Blood Glucose.: 126 mg/dL (21 Sep 2022 22:29)  POCT Blood Glucose.: 128 mg/dL (21 Sep 2022 17:46)  POCT Blood Glucose.: 152 mg/dL (21 Sep 2022 12:27)  POCT Blood Glucose.: 115 mg/dL (21 Sep 2022 08:41)    PHYSICAL EXAM:       ICU Vital Signs Last 24 Hrs  T(C): 36.6 (22 Sep 2022 04:14), Max: 36.6 (22 Sep 2022 04:14)  T(F): 97.8 (22 Sep 2022 04:14), Max: 97.8 (22 Sep 2022 04:14)  HR: 60 (22 Sep 2022 04:14) (59 - 67)  BP: 107/66 (22 Sep 2022 04:14) (92/50 - 113/64)  BP(mean): --  ABP: --  ABP(mean): --  RR: 18 (22 Sep 2022 04:14) (18 - 18)  SpO2: 96% (22 Sep 2022 04:14) (94% - 99%) on 2lpm nasal canula     General: Awake. Alert. Cooperative. No distress. Appears stated age. Obese.   HEENT: Atraumatic. Normocephalic. Anicteric. Normal oral mucosa. PERRL. EOMI.  Neck: Supple. Trachea midline. Thyroid without enlargement/tenderness/nodules. No carotid bruit. No JVD.	  Cardiovascular: Regular rate and rhythm. Distant S1 S2. No murmurs, rubs or gallops.  Respiratory: Respirations unlabored. Bilateral course breath sounds. Improved breath sounds left hemithorax. Decreased breath sounds at the bases with dullness to percussion. No curvature.  Abdomen: Soft. Non-tender. Non-distended. No organomegaly. No masses. Normal bowel sounds. Adair catheter  Extremities: R BKA stump with healing surgical wound  Pulses: Decreased peripheral pulses LLE  Skin: Venous stasis changes left lower extremity. Left heel skin tear. Dorsal surface of the left foot bullae.  Lymph Nodes: Cervical, supraclavicular and axillary nodes normal  Neurological: Left sided weakness left > arm. Left facial droop. A and O x 3  Psychiatry: Appropriate mood and affect.    LABS:                          9.8    10.57 )-----------( 324      ( 21 Sep 2022 07:36 )             34.5     CBC    WBC  10.57 <==    Hemoglobin  9.8 <<==    Hematocrit  34.5 <==    Platelets  324 <==      142  |  103  |  80<H>  ----------------------------<  58<L>      3.5   |  28  |  1.73<H>      LYTES    sodium  142 <==, 145 <==, 143 <==, 142 <==, 138 <==    potassium   3.5 <==, 3.4 <==, 3.4 <==, 3.1 <==, 3.5 <==    chloride  103 <==, 105 <==, 102 <==, 103 <==, 100 <==    carbon dioxide  28 <==, 27 <==, 29 <==, 25 <==, 29 <==    =============================================================================================  RENAL FUNCTION:    Creatinine:   1.73  <<==, 1.48  <<==, 1.44  <<==, 1.56  <<==, 1.76  <<==    BUN:   80 <==, 63 <==, 56 <==, 56 <==, 49 <==    ============================================================================================    calcium   8.4 <==, 8.5 <==, 8.7 <==, 8.6 <==, 8.3 <==    phos   3.4 <==    mag   1.8 <==, 1.8 <==, 1.8 <==, 1.9 <==    ============================================================================================  PT/INR - ( 22 Sep 2022 07:30 )   PT: 34.3 sec;   INR: 2.95 ratio      PTT - ( 22 Sep 2022 07:30 )  PTT:34.8 sec    Venous Blood Gas:   @ 13:10  7.38/57/31/34/45.9  VBG Lactate: 1.2    Serum Pro-Brain Natriuretic Peptide: 2388 pg/mL ( @ 13:36)    CARDIAC MARKERS ( 15 Sep 2022 07:22 )  CPK x     /CKMB x     /CKMB Units x        troponin 165 ng/L    CARDIAC MARKERS ( 14 Sep 2022 16:27 )  CPK x     /CKMB x     /CKMB Units x        troponin 145 ng/L    CARDIAC MARKERS ( 14 Sep 2022 13:36 )  CPK x     /CKMB x     /CKMB Units x        troponin 154 ng/L    < from: TTE with Doppler (w/Cont) (09.15.22 @ 12:59) >    Patient name: Martir Heart  YOB: 1935   Age: 87 (M)   MR#: [id number]  Study Date: 9/15/2022  ------------------------------------------------------------------------  Dimensions:    Normal Values:  LA:     3.5    2.0 - 4.0 cm  Ao:     3.6    2.0 - 3.8 cm  SEPTUM: 1.0    0.6 - 1.2 cm  PWT:    1.0    0.6 - 1.1 cm  LVIDd:  4.3    3.0 - 5.6 cm  LVIDs:  3.4    1.8 - 4.0 cm  Derived variables:  LVMI: 78 g/m2  RWT: 0.50  Fractional short: 21 %  EF (Visual Estimate): 35-40 %  Doppler Peak Velocity (m/sec): AoV=1.6  ---------------------------------------------------------------------------------------------------------------  Conclusions:  1. Increased relative wall thickness with normal left  ventricular mass index, consistent with concentric left  ventricular remodeling.  2. Endocardial visualization enhanced with intravenous  injection of Ultrasonic Enhancing Agent (Lumason). Severe  segmental left ventricular systolic dysfunction. The mid to  distal anteroseptum and severely hypokinetic. The mid to  basal inferolateral wall is hypokinetic.  3. Increased E/e'  is consistent with elevated left  ventricular filling pressure.  4. Normal right ventricular size and function.  *** Compared with echocardiogram of 4/3/2022, no  significant changes noted.  ------------------------------------------------------------------------  Confirmed on  9/15/2022 - 16:03:54 by Zain Tucker M.D.  ------------------------------------------------------------------------  ---------------------------------------------------------------------------------------------------------------    MICROBIOLOGY:     Respiratory Viral Panel with COVID-19 by TANJA (22 @ 13:36)   Rapid RVP Result: [facility]   SARS-CoV-2: [facility]  This Respiratory Panel uses polymerase chain reaction (PCR) to detect for   adenovirus; coronavirus (HKU1, NL63, 229E, OC43); human metapneumovirus   (hMPV); human enterovirus/rhinovirus (Entero/RV); influenza A; influenza   A/H1; influenza A/H3; influenza A/H1-2009; influenza B; parainfluenza   viruses 1, 2, 3, 4; respiratory syncytial virus; Mycoplasma pneumoniae;   Chlamydophila pneumoniae; and SARS-CoV-2.     Urinalysis Basic - ( 20 Sep 2022 18:50 )    Color: Yellow / Appearance: Clear / S.019 / pH: x  Gluc: x / Ketone: Negative  / Bili: Negative / Urobili: Negative   Blood: x / Protein: Negative / Nitrite: Negative   Leuk Esterase: Negative / RBC: x / WBC x   Sq Epi: x / Non Sq Epi: x / Bacteria: x    RADIOLOGY:  [x] Chest radiographs reviewed and interpreted by me    EXAM:  CT CHEST                          PROCEDURE DATE:  2022      FINDINGS:    Lungs/Airways/Pleura: There is centrilobular and paraseptal emphysema.   There are retained secretions in the trachea and new extensive mucous   plugging in the left lower lobe. Stable tubular nodularity in the right   middle lobe and posterior right upper lobe. There is extensive   centrilobular and branching linear densities in the right middle and   right lower lobes, some of which are calcified. Ill-defined   calcifications in the left hepatic lobe. Decreased moderate left and   small right pleural effusions.    Mediastinum/Lymph nodes: No thoracic adenopathy.    Heart and Vessels: The heart is enlarged. No pericardial effusion. There   are coronary artery calcifications. The aorta is normal in caliber.   Dual-chamber pacer leads terminate in the right atrium and right   ventricle.    Upper Abdomen: Unremarkable.    Osseous structures and Soft Tissues: There is qualitative osteopenia. Old   rib fractures are unchanged.    IMPRESSION:    1.  Decreased moderate left and small right pleural effusions    2.  New mucous plugging in both lower lobes with retained secretions in   the trachea, question aspiration. Extensive small airways impaction in   the right middle and right lower lobes, some of which is calcified, may   reflect prior aspiration event.    3.  Stable tubular nodularity in the right upper lobe and right middle   lobe. Reassessment on follow-up chest CT scan is recommended in 4-6 weeks    4.  Emphysema    BRITTANY ESPINAL M.D., Attending Radiologist  This document hasbeen electronically signed. Sep 20 2022  5:06PM  ---------------------------------------------------------------------------------------------------------------  EXAM:  XR CHEST AP OR PA 1V                          PROCEDURE DATE:  09/15/2022      Frontal expiratory view of the chest shows the heart to be similar in   size. Left cardiac pacemaker is again noted.    The lungs show less right atelectasis with similar left effusion and   there is no evidence of pneumothorax nor right pleural effusion.    IMPRESSION:  No pneumothorax.    HORACIO HELMS MD; Attending Interventional Radiologist  This document has been electronically signed. Sep 16 2022 11:18AM  ---------------------------------------------------------------------------------------------------------------  EXAM:  XR CHEST PORTABLE URGENT 1V                          PROCEDURE DATE:  2022      FINDINGS:    Left chest wall pacemaker.  The heart size cannot be assessed on this projection.  Small left pleural effusion with associated atelectasis, mildly increased   since 2022. The right lung is clear. No pneumothorax.    IMPRESSION:  Small left pleural effusion mildly increased since 2022.    YAN DELGADO MD; Resident Radiology  This document has been electronically signed.  SATURNINO CHERRY MD; Attending Radiologist  This document has been electronically signed. Sep 14 2022  2:07PM    ---------------------------------------------------------------------------------------------------------------  EXAM:  CT ANGIO CHEST PULM ART WAWIC                          PROCEDURE DATE:  2022      FINDINGS:    LUNGS AND AIRWAYS/PLEURA: Patent central airways.  Centrilobular   emphysema. Moderate left and small right pleural effusions with   associated partial right and complete left lower lobe compressive   atelectasis. There are patchy and nodular areas of consolidation   throughout the aerated portions of the right lung dependently, new since   May 2022 (i.e. 2:54 in the posterior right upper lobe). Other nodular   areas of consolidation in the posterior left upper lobe persist although   have improved since May 2022 (2:38).    MEDIASTINUM AND MARK: No lymphadenopathy.    VESSELS: Calcifications of the aorta and coronary arteries. No main,   right, left, lobar or segmental pulmonary embolus. Limited evaluation of   the subsegmental branches. And great vessels are normal in size.    HEART: Left chest wall dual chamber pacemaker with right atrial and right   ventricular leads. Heart size is normal. No pericardial effusion.    VISUALIZED UPPER ABDOMEN: Cholelithiasis.    BONES: Remote left posterior rib fractures.    IMPRESSION:  No pulmonary embolism.    Moderate left and small right pleural effusions, increased since May   2022, with worsening lower lobe compressive atelectasis.    New right lung patchy and nodular areas of consolidation, likely   infectious/inflammatory.    JAQUAN LARA MD; Resident Radiologist  This document has been electronically signed.  MELANY MCNAIR M.D., Attending Radiologist  This document has been electronically signed. Sep 14 2022  5:06PM  ---------------------------------------------------------------------------------------------------------------      
Patient is a 87y old  Male who presents with a chief complaint of acute rayshawn chronic diastolic CHF exacerbation, Pneumonia (17 Sep 2022 14:29)      SUBJECTIVE / OVERNIGHT EVENTS: no new events    MEDICATIONS  (STANDING):  albuterol/ipratropium for Nebulization 3 milliLiter(s) Nebulizer every 6 hours  aspirin enteric coated 81 milliGRAM(s) Oral daily  atorvastatin 80 milliGRAM(s) Oral at bedtime  buDESOnide    Inhalation Suspension 0.5 milliGRAM(s) Inhalation two times a day  cadexomer iodine 0.9% Gel 1 Application(s) Topical daily  dextrose 5%. 1000 milliLiter(s) (100 mL/Hr) IV Continuous <Continuous>  dextrose 5%. 1000 milliLiter(s) (50 mL/Hr) IV Continuous <Continuous>  dextrose 50% Injectable 25 Gram(s) IV Push once  dextrose 50% Injectable 12.5 Gram(s) IV Push once  dextrose 50% Injectable 25 Gram(s) IV Push once  doxycycline IVPB      doxycycline IVPB 100 milliGRAM(s) IV Intermittent every 12 hours  finasteride 5 milliGRAM(s) Oral daily  furosemide   Injectable 40 milliGRAM(s) IV Push every 12 hours  gabapentin 100 milliGRAM(s) Oral two times a day  glucagon  Injectable 1 milliGRAM(s) IntraMuscular once  guaifenesin/dextromethorphan Oral Liquid 10 milliLiter(s) Oral every 6 hours  insulin glargine Injectable (LANTUS) 26 Unit(s) SubCutaneous at bedtime  insulin lispro (ADMELOG) corrective regimen sliding scale   SubCutaneous three times a day before meals  insulin lispro (ADMELOG) corrective regimen sliding scale   SubCutaneous at bedtime  insulin lispro Injectable (ADMELOG) 13 Unit(s) SubCutaneous three times a day before meals  levothyroxine 25 MICROGram(s) Oral daily  methylPREDNISolone sodium succinate Injectable 20 milliGRAM(s) IV Push every 6 hours  metoprolol tartrate 25 milliGRAM(s) Oral two times a day  montelukast 10 milliGRAM(s) Oral at bedtime  multivitamin/minerals 1 Tablet(s) Oral daily  pantoprazole    Tablet 40 milliGRAM(s) Oral before breakfast  piperacillin/tazobactam IVPB.. 3.375 Gram(s) IV Intermittent every 8 hours  polyethylene glycol 3350 17 Gram(s) Oral daily  sacubitril 24 mG/valsartan 26 mG 1 Tablet(s) Oral two times a day  senna 2 Tablet(s) Oral at bedtime  tamsulosin 0.8 milliGRAM(s) Oral at bedtime    MEDICATIONS  (PRN):  acetaminophen     Tablet .. 650 milliGRAM(s) Oral every 6 hours PRN Moderate Pain (4 - 6)  dextrose Oral Gel 15 Gram(s) Oral once PRN Blood Glucose LESS THAN 70 milliGRAM(s)/deciliter      Vital Signs Last 24 Hrs  T(F): 97.5 (09-17-22 @ 21:19), Max: 97.5 (09-17-22 @ 21:19)  HR: 94 (09-17-22 @ 21:19) (68 - 94)  BP: 138/67 (09-17-22 @ 21:19) (138/67 - 145/69)  RR: 18 (09-17-22 @ 21:19) (18 - 18)  SpO2: 94% (09-17-22 @ 21:19) (94% - 100%)  Telemetry:   CAPILLARY BLOOD GLUCOSE      POCT Blood Glucose.: 319 mg/dL (17 Sep 2022 21:29)  POCT Blood Glucose.: 366 mg/dL (17 Sep 2022 17:34)  POCT Blood Glucose.: 462 mg/dL (17 Sep 2022 12:57)  POCT Blood Glucose.: 387 mg/dL (17 Sep 2022 08:41)    I&O's Summary    16 Sep 2022 07:01  -  17 Sep 2022 07:00  --------------------------------------------------------  IN: 540 mL / OUT: 1200 mL / NET: -660 mL    17 Sep 2022 07:01  -  17 Sep 2022 22:25  --------------------------------------------------------  IN: 360 mL / OUT: 2650 mL / NET: -2290 mL        PHYSICAL EXAM:  GENERAL: NAD, well-developed  HEAD:  Atraumatic, Normocephalic  EYES: EOMI, PERRLA, conjunctiva and sclera clear  NECK: Supple, No JVD  CHEST/LUNG: Clear to auscultation bilaterally; No wheeze  HEART: Regular rate and rhythm; No murmurs, rubs, or gallops  ABDOMEN: Soft, Nontender, Nondistended; Bowel sounds present  EXTREMITIES:  2+ Peripheral Pulses, No clubbing, cyanosis, or edema  PSYCH: AAOx3  NEUROLOGY: non-focal  SKIN: No rashes or lesions    LABS:    09-17    138  |  100  |  49<H>  ----------------------------<  400<H>  3.5   |  29  |  1.76<H>    Ca    8.3<L>      17 Sep 2022 07:18  Mg     1.9     09-17      PT/INR - ( 17 Sep 2022 07:23 )   PT: 18.8 sec;   INR: 1.63 ratio                   RADIOLOGY & ADDITIONAL TESTS:    Imaging Personally Reviewed:    Consultant(s) Notes Reviewed:      Care Discussed with Consultants/Other Providers:  
[facility] - ORESTES Wall / ORESTES Wynne / KARIE Melendez/ ORESTES Knight/ ORESTES Jang/ ADRIA Lee / TOÑITO Mora / ROBB Kapoor  ---------------------------------------------------------------------------------------------------------------  seen and examined today for KARLOS on CKD  Interval : serum creatinine improving, risung BUN  VITALS:  T(F): 97.4 (09-23-22 @ 04:30), Max: 97.5 (09-22-22 @ 21:09)  HR: 60 (09-23-22 @ 04:30)  BP: 107/61 (09-23-22 @ 04:30)  RR: 18 (09-23-22 @ 04:30)  SpO2: 96% (09-23-22 @ 04:30)  Wt(kg): --    09-22 @ 07:01  -  09-23 @ 07:00  --------------------------------------------------------  IN: 180 mL / OUT: 700 mL / NET: -520 mL    09-23 @ 07:01  -  09-23 @ 10:47  --------------------------------------------------------  IN: 180 mL / OUT: 0 mL / NET: 180 mL      Physical Exam :-  Constitutional: NAD  Neck: Supple.  Respiratory: Bilateral equal breath sounds,  Cardiovascular: S1, S2 normal,  Gastrointestinal: Bowel Sounds present, soft, non tender.  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 1000 milliLiter(s) (50 mL/Hr) IV Continuous <Continuous>  dextrose 5%. 1000 milliLiter(s) (100 mL/Hr) IV Continuous <Continuous>  dextrose 50% Injectable 25 Gram(s) IV Push once  dextrose 50% Injectable 25 Gram(s) IV Push once  dextrose 50% Injectable 12.5 Gram(s) IV Push once  finasteride 5 milliGRAM(s) Oral daily  gabapentin 100 milliGRAM(s) Oral two times a day  glucagon  Injectable 1 milliGRAM(s) IntraMuscular once  guaifenesin/dextromethorphan Oral Liquid 10 milliLiter(s) Oral every 6 hours  insulin glargine Injectable (LANTUS) 10 Unit(s) SubCutaneous at bedtime  insulin lispro (ADMELOG) corrective regimen sliding scale   SubCutaneous three times a day before meals  insulin lispro (ADMELOG) corrective regimen sliding scale   SubCutaneous at bedtime  insulin lispro Injectable (ADMELOG) 12 Unit(s) SubCutaneous before dinner  insulin lispro Injectable (ADMELOG) 18 Unit(s) SubCutaneous before breakfast  insulin lispro Injectable (ADMELOG) 12 Unit(s) SubCutaneous before lunch  levothyroxine 25 MICROGram(s) Oral daily  metoprolol tartrate 50 milliGRAM(s) Oral two times a day  montelukast 10 milliGRAM(s) Oral at bedtime  multivitamin/minerals 1 Tablet(s) Oral daily  pantoprazole    Tablet 40 milliGRAM(s) Oral before breakfast  polyethylene glycol 3350 17 Gram(s) Oral daily  predniSONE   Tablet 50 milliGRAM(s) Oral daily  senna 2 Tablet(s) Oral at bedtime  sodium chloride 3%  Inhalation 4 milliLiter(s) Inhalation every 12 hours  tamsulosin 0.8 milliGRAM(s) Oral at bedtime  warfarin 3 milliGRAM(s) Oral once    09-23    142  |  103  |  94<H>  ----------------------------<  168<H>  4.0   |  26  |  1.70<H>    Ca    8.3<L>      23 Sep 2022 07:08      Creatinine Trend: 1.70 <--, 1.85 <--, 1.73 <--, 1.48 <--, 1.44 <--, 1.56 <--, 1.76 <--                        9.8    12.65 )-----------( 299      ( 23 Sep 2022 07:11 )             34.4   
Patient is a 87y old  Male who presents with a chief complaint of acute rayshawn chronic diastolic CHF exacerbation, Pneumonia (16 Sep 2022 12:15)      SUBJECTIVE / OVERNIGHT EVENTS: no new c/o. TTE done 9/15/22, no changes comp to 4/2022    MEDICATIONS  (STANDING):  albuterol/ipratropium for Nebulization 3 milliLiter(s) Nebulizer every 6 hours  aspirin enteric coated 81 milliGRAM(s) Oral daily  atorvastatin 80 milliGRAM(s) Oral at bedtime  buDESOnide    Inhalation Suspension 0.5 milliGRAM(s) Inhalation two times a day  dextrose 5%. 1000 milliLiter(s) (100 mL/Hr) IV Continuous <Continuous>  dextrose 5%. 1000 milliLiter(s) (50 mL/Hr) IV Continuous <Continuous>  dextrose 50% Injectable 25 Gram(s) IV Push once  dextrose 50% Injectable 12.5 Gram(s) IV Push once  dextrose 50% Injectable 25 Gram(s) IV Push once  doxycycline IVPB      doxycycline IVPB 100 milliGRAM(s) IV Intermittent every 12 hours  finasteride 5 milliGRAM(s) Oral daily  furosemide   Injectable 40 milliGRAM(s) IV Push every 12 hours  gabapentin 100 milliGRAM(s) Oral two times a day  glucagon  Injectable 1 milliGRAM(s) IntraMuscular once  guaifenesin/dextromethorphan Oral Liquid 10 milliLiter(s) Oral every 6 hours  insulin glargine Injectable (LANTUS) 24 Unit(s) SubCutaneous at bedtime  insulin lispro (ADMELOG) corrective regimen sliding scale   SubCutaneous three times a day before meals  insulin lispro Injectable (ADMELOG) 7 Unit(s) SubCutaneous three times a day before meals  levothyroxine 25 MICROGram(s) Oral daily  methylPREDNISolone sodium succinate Injectable 20 milliGRAM(s) IV Push every 6 hours  metoprolol tartrate 25 milliGRAM(s) Oral two times a day  montelukast 10 milliGRAM(s) Oral at bedtime  multivitamin/minerals 1 Tablet(s) Oral daily  pantoprazole    Tablet 40 milliGRAM(s) Oral before breakfast  piperacillin/tazobactam IVPB.. 3.375 Gram(s) IV Intermittent every 8 hours  polyethylene glycol 3350 17 Gram(s) Oral daily  sacubitril 24 mG/valsartan 26 mG 1 Tablet(s) Oral two times a day  senna 2 Tablet(s) Oral at bedtime  tamsulosin 0.8 milliGRAM(s) Oral at bedtime  warfarin 5 milliGRAM(s) Oral once    MEDICATIONS  (PRN):  acetaminophen     Tablet .. 650 milliGRAM(s) Oral every 6 hours PRN Moderate Pain (4 - 6)  dextrose Oral Gel 15 Gram(s) Oral once PRN Blood Glucose LESS THAN 70 milliGRAM(s)/deciliter      Vital Signs Last 24 Hrs  T(F): 97.5 (09-16-22 @ 11:17), Max: 98 (09-16-22 @ 04:31)  HR: 66 (09-16-22 @ 11:17) (66 - 91)  BP: 119/61 (09-16-22 @ 11:17) (119/61 - 150/74)  RR: 18 (09-16-22 @ 11:17) (18 - 20)  SpO2: 100% (09-16-22 @ 11:17) (92% - 100%)  Telemetry:   CAPILLARY BLOOD GLUCOSE      POCT Blood Glucose.: 409 mg/dL (16 Sep 2022 12:37)  POCT Blood Glucose.: 427 mg/dL (16 Sep 2022 12:36)  POCT Blood Glucose.: 392 mg/dL (16 Sep 2022 08:43)    I&O's Summary    15 Sep 2022 07:01  -  16 Sep 2022 07:00  --------------------------------------------------------  IN: 120 mL / OUT: 1500 mL / NET: -1380 mL    16 Sep 2022 07:01  -  16 Sep 2022 16:41  --------------------------------------------------------  IN: 420 mL / OUT: 500 mL / NET: -80 mL        PHYSICAL EXAM:  GENERAL: NAD, well-developed  HEAD:  Atraumatic, Normocephalic  EYES: EOMI, PERRLA, conjunctiva and sclera clear  NECK: Supple, No JVD  CHEST/LUNG: Clear to auscultation bilaterally; No wheeze  HEART: Regular rate and rhythm; No murmurs, rubs, or gallops  ABDOMEN: Soft, Nontender, Nondistended; Bowel sounds present  EXTREMITIES:  2+ Peripheral Pulses, No clubbing, cyanosis, or edema  PSYCH: AAOx3  NEUROLOGY: non-focal  SKIN: No rashes or lesions    LABS:                        8.5    7.73  )-----------( 293      ( 15 Sep 2022 07:22 )             30.1     09-15    140  |  102  |  21  ----------------------------<  190<H>  4.1   |  27  |  0.95    Ca    8.7      15 Sep 2022 07:22      PT/INR - ( 16 Sep 2022 05:26 )   PT: 24.0 sec;   INR: 2.05 ratio                   RADIOLOGY & ADDITIONAL TESTS:    Imaging Personally Reviewed:    Consultant(s) Notes Reviewed:      Care Discussed with Consultants/Other Providers:  
Patient is a 87y old  Male who presents with a chief complaint of acute rayshawn chronic diastolic CHF exacerbation, Pneumonia (18 Sep 2022 13:02)      SUBJECTIVE / OVERNIGHT EVENTS: BGM still elevated, Endo following    MEDICATIONS  (STANDING):  albuterol/ipratropium for Nebulization 3 milliLiter(s) Nebulizer every 6 hours  aspirin enteric coated 81 milliGRAM(s) Oral daily  atorvastatin 80 milliGRAM(s) Oral at bedtime  buDESOnide    Inhalation Suspension 0.5 milliGRAM(s) Inhalation two times a day  cadexomer iodine 0.9% Gel 1 Application(s) Topical daily  dextrose 5%. 1000 milliLiter(s) (100 mL/Hr) IV Continuous <Continuous>  dextrose 5%. 1000 milliLiter(s) (50 mL/Hr) IV Continuous <Continuous>  dextrose 50% Injectable 25 Gram(s) IV Push once  dextrose 50% Injectable 12.5 Gram(s) IV Push once  dextrose 50% Injectable 25 Gram(s) IV Push once  doxycycline IVPB      doxycycline IVPB 100 milliGRAM(s) IV Intermittent every 12 hours  finasteride 5 milliGRAM(s) Oral daily  furosemide   Injectable 40 milliGRAM(s) IV Push every 12 hours  gabapentin 100 milliGRAM(s) Oral two times a day  glucagon  Injectable 1 milliGRAM(s) IntraMuscular once  guaifenesin/dextromethorphan Oral Liquid 10 milliLiter(s) Oral every 6 hours  insulin glargine Injectable (LANTUS) 26 Unit(s) SubCutaneous at bedtime  insulin lispro (ADMELOG) corrective regimen sliding scale   SubCutaneous three times a day before meals  insulin lispro (ADMELOG) corrective regimen sliding scale   SubCutaneous at bedtime  insulin lispro Injectable (ADMELOG) 20 Unit(s) SubCutaneous three times a day before meals  levothyroxine 25 MICROGram(s) Oral daily  methylPREDNISolone sodium succinate Injectable 20 milliGRAM(s) IV Push every 6 hours  metoprolol tartrate 50 milliGRAM(s) Oral two times a day  metoprolol tartrate 25 milliGRAM(s) Oral once  montelukast 10 milliGRAM(s) Oral at bedtime  multivitamin/minerals 1 Tablet(s) Oral daily  pantoprazole    Tablet 40 milliGRAM(s) Oral before breakfast  piperacillin/tazobactam IVPB.. 3.375 Gram(s) IV Intermittent every 8 hours  polyethylene glycol 3350 17 Gram(s) Oral daily  potassium chloride    Tablet ER 40 milliEquivalent(s) Oral every 4 hours  sacubitril 24 mG/valsartan 26 mG 1 Tablet(s) Oral two times a day  senna 2 Tablet(s) Oral at bedtime  tamsulosin 0.8 milliGRAM(s) Oral at bedtime  warfarin 3 milliGRAM(s) Oral once    MEDICATIONS  (PRN):  acetaminophen     Tablet .. 650 milliGRAM(s) Oral every 6 hours PRN Moderate Pain (4 - 6)  dextrose Oral Gel 15 Gram(s) Oral once PRN Blood Glucose LESS THAN 70 milliGRAM(s)/deciliter      Vital Signs Last 24 Hrs  T(F): 97.5 (09-18-22 @ 12:30), Max: 97.5 (09-17-22 @ 21:19)  HR: 62 (09-18-22 @ 12:30) (62 - 94)  BP: 124/60 (09-18-22 @ 12:30) (120/64 - 146/67)  RR: 18 (09-18-22 @ 12:30) (18 - 18)  SpO2: 96% (09-18-22 @ 12:30) (93% - 99%)  Telemetry:   CAPILLARY BLOOD GLUCOSE      POCT Blood Glucose.: 343 mg/dL (18 Sep 2022 13:26)  POCT Blood Glucose.: 229 mg/dL (18 Sep 2022 08:58)  POCT Blood Glucose.: 319 mg/dL (17 Sep 2022 21:29)  POCT Blood Glucose.: 366 mg/dL (17 Sep 2022 17:34)    I&O's Summary    17 Sep 2022 07:01  -  18 Sep 2022 07:00  --------------------------------------------------------  IN: 360 mL / OUT: 3150 mL / NET: -2790 mL        PHYSICAL EXAM:  GENERAL: NAD, well-developed  HEAD:  Atraumatic, Normocephalic  EYES: EOMI, PERRLA, conjunctiva and sclera clear  NECK: Supple, No JVD  CHEST/LUNG: Clear to auscultation bilaterally; No wheeze  HEART: Regular rate and rhythm; No murmurs, rubs, or gallops  ABDOMEN: Soft, Nontender, Nondistended; Bowel sounds present  EXTREMITIES:  2+ Peripheral Pulses, No clubbing, cyanosis, or edema  PSYCH: AAOx3  NEUROLOGY: non-focal  SKIN: No rashes or lesions    LABS:    09-18    142  |  103  |  56<H>  ----------------------------<  193<H>  3.1<L>   |  25  |  1.56<H>    Ca    8.6      18 Sep 2022 06:52  Phos  3.4     09-18  Mg     1.8     09-18      PT/INR - ( 18 Sep 2022 06:51 )   PT: 28.4 sec;   INR: 2.43 ratio         PTT - ( 18 Sep 2022 06:51 )  PTT:30.4 sec          RADIOLOGY & ADDITIONAL TESTS:    Imaging Personally Reviewed:    Consultant(s) Notes Reviewed:      Care Discussed with Consultants/Other Providers:  
Patient is a 87y old  Male who presents with a chief complaint of acute rayshawn chronic diastolic CHF exacerbation, Pneumonia (21 Sep 2022 15:41)      SUBJECTIVE / OVERNIGHT EVENTS: awaiting DC to [facility]    MEDICATIONS  (STANDING):  acetylcysteine 20%  Inhalation 4 milliLiter(s) Inhalation three times a day  albuterol/ipratropium for Nebulization 3 milliLiter(s) Nebulizer every 6 hours  aspirin enteric coated 81 milliGRAM(s) Oral daily  atorvastatin 80 milliGRAM(s) Oral at bedtime  buDESOnide    Inhalation Suspension 0.5 milliGRAM(s) Inhalation two times a day  cadexomer iodine 0.9% Gel 1 Application(s) Topical daily  dextrose 5%. 1000 milliLiter(s) (100 mL/Hr) IV Continuous <Continuous>  dextrose 5%. 1000 milliLiter(s) (50 mL/Hr) IV Continuous <Continuous>  dextrose 50% Injectable 25 Gram(s) IV Push once  dextrose 50% Injectable 12.5 Gram(s) IV Push once  dextrose 50% Injectable 25 Gram(s) IV Push once  finasteride 5 milliGRAM(s) Oral daily  gabapentin 100 milliGRAM(s) Oral two times a day  glucagon  Injectable 1 milliGRAM(s) IntraMuscular once  guaifenesin/dextromethorphan Oral Liquid 10 milliLiter(s) Oral every 6 hours  insulin glargine Injectable (LANTUS) 20 Unit(s) SubCutaneous at bedtime  insulin lispro (ADMELOG) corrective regimen sliding scale   SubCutaneous at bedtime  insulin lispro (ADMELOG) corrective regimen sliding scale   SubCutaneous three times a day before meals  insulin lispro Injectable (ADMELOG) 15 Unit(s) SubCutaneous before dinner  levothyroxine 25 MICROGram(s) Oral daily  metoprolol tartrate 50 milliGRAM(s) Oral two times a day  montelukast 10 milliGRAM(s) Oral at bedtime  multivitamin/minerals 1 Tablet(s) Oral daily  pantoprazole    Tablet 40 milliGRAM(s) Oral before breakfast  polyethylene glycol 3350 17 Gram(s) Oral daily  predniSONE   Tablet 50 milliGRAM(s) Oral daily  sacubitril 24 mG/valsartan 26 mG 1 Tablet(s) Oral two times a day  senna 2 Tablet(s) Oral at bedtime  sodium chloride 3%  Inhalation 4 milliLiter(s) Inhalation every 12 hours  tamsulosin 0.8 milliGRAM(s) Oral at bedtime    MEDICATIONS  (PRN):  acetaminophen     Tablet .. 650 milliGRAM(s) Oral every 6 hours PRN Moderate Pain (4 - 6)  dextrose Oral Gel 15 Gram(s) Oral once PRN Blood Glucose LESS THAN 70 milliGRAM(s)/deciliter      Vital Signs Last 24 Hrs  T(F): 97.5 (22 @ 11:54), Max: 97.5 (22 @ 21:07)  HR: 59 (22 @ 11:54) (59 - 60)  BP: 101/54 (22 @ 11:54) (101/54 - 122/62)  RR: 18 (22 @ 11:54) (18 - 18)  SpO2: 99% (22 @ 11:54) (98% - 99%)  Telemetry:   CAPILLARY BLOOD GLUCOSE      POCT Blood Glucose.: 128 mg/dL (21 Sep 2022 17:46)  POCT Blood Glucose.: 152 mg/dL (21 Sep 2022 12:27)  POCT Blood Glucose.: 115 mg/dL (21 Sep 2022 08:41)  POCT Blood Glucose.: 102 mg/dL (20 Sep 2022 21:55)    I&O's Summary    20 Sep 2022 07:01  -  21 Sep 2022 07:00  --------------------------------------------------------  IN: 920 mL / OUT: 1300 mL / NET: -380 mL    21 Sep 2022 07:01  -  21 Sep 2022 18:15  --------------------------------------------------------  IN: 360 mL / OUT: 550 mL / NET: -190 mL        PHYSICAL EXAM:  GENERAL: NAD, well-developed  HEAD:  Atraumatic, Normocephalic  EYES: EOMI, PERRLA, conjunctiva and sclera clear  NECK: Supple, No JVD  CHEST/LUNG: Clear to auscultation bilaterally; No wheeze  HEART: Regular rate and rhythm; No murmurs, rubs, or gallops  ABDOMEN: Soft, Nontender, Nondistended; Bowel sounds present  EXTREMITIES:  2+ Peripheral Pulses, No clubbing, cyanosis, or edema  PSYCH: AAOx3  NEUROLOGY: non-focal  SKIN: No rashes or lesions    LABS:                        9.8    10.57 )-----------( 324      ( 21 Sep 2022 07:36 )             34.5         142  |  103  |  80<H>  ----------------------------<  58<L>  3.5   |  28  |  1.73<H>    Ca    8.4      21 Sep 2022 07:40  Mg     1.8           PT/INR - ( 21 Sep 2022 07:38 )   PT: 37.8 sec;   INR: 3.22 ratio         PTT - ( 21 Sep 2022 07:38 )  PTT:35.4 sec      Urinalysis Basic - ( 20 Sep 2022 18:50 )    Color: Yellow / Appearance: Clear / S.019 / pH: x  Gluc: x / Ketone: Negative  / Bili: Negative / Urobili: Negative   Blood: x / Protein: Negative / Nitrite: Negative   Leuk Esterase: Negative / RBC: x / WBC x   Sq Epi: x / Non Sq Epi: x / Bacteria: x        RADIOLOGY & ADDITIONAL TESTS:    Imaging Personally Reviewed:    Consultant(s) Notes Reviewed:      Care Discussed with Consultants/Other Providers:  
Patient is a 87y old  Male who presents with a chief complaint of acute rayshawn chronic diastolic CHF exacerbation, Pneumonia (22 Sep 2022 13:51)      SUBJECTIVE / OVERNIGHT EVENTS: 600 cc therapeutic L Thoracentesis done, off ENTRESTO and LASIX 2/2 KARLOS    MEDICATIONS  (STANDING):  acetylcysteine 20%  Inhalation 4 milliLiter(s) Inhalation three times a day  albuterol/ipratropium for Nebulization 3 milliLiter(s) Nebulizer every 6 hours  aspirin enteric coated 81 milliGRAM(s) Oral daily  atorvastatin 80 milliGRAM(s) Oral at bedtime  buDESOnide    Inhalation Suspension 0.5 milliGRAM(s) Inhalation two times a day  cadexomer iodine 0.9% Gel 1 Application(s) Topical daily  dextrose 5%. 1000 milliLiter(s) (50 mL/Hr) IV Continuous <Continuous>  dextrose 5%. 1000 milliLiter(s) (100 mL/Hr) IV Continuous <Continuous>  dextrose 50% Injectable 25 Gram(s) IV Push once  dextrose 50% Injectable 12.5 Gram(s) IV Push once  dextrose 50% Injectable 25 Gram(s) IV Push once  finasteride 5 milliGRAM(s) Oral daily  gabapentin 100 milliGRAM(s) Oral two times a day  glucagon  Injectable 1 milliGRAM(s) IntraMuscular once  guaifenesin/dextromethorphan Oral Liquid 10 milliLiter(s) Oral every 6 hours  insulin glargine Injectable (LANTUS) 10 Unit(s) SubCutaneous at bedtime  insulin lispro (ADMELOG) corrective regimen sliding scale   SubCutaneous three times a day before meals  insulin lispro (ADMELOG) corrective regimen sliding scale   SubCutaneous at bedtime  insulin lispro Injectable (ADMELOG) 12 Unit(s) SubCutaneous before dinner  insulin lispro Injectable (ADMELOG) 12 Unit(s) SubCutaneous before lunch  levothyroxine 25 MICROGram(s) Oral daily  metoprolol tartrate 50 milliGRAM(s) Oral two times a day  montelukast 10 milliGRAM(s) Oral at bedtime  multivitamin/minerals 1 Tablet(s) Oral daily  oxycodone    5 mG/acetaminophen 325 mG 2 Tablet(s) Oral once  pantoprazole    Tablet 40 milliGRAM(s) Oral before breakfast  polyethylene glycol 3350 17 Gram(s) Oral daily  predniSONE   Tablet 50 milliGRAM(s) Oral daily  senna 2 Tablet(s) Oral at bedtime  sodium chloride 3%  Inhalation 4 milliLiter(s) Inhalation every 12 hours  tamsulosin 0.8 milliGRAM(s) Oral at bedtime  warfarin 3 milliGRAM(s) Oral once    MEDICATIONS  (PRN):  acetaminophen     Tablet .. 650 milliGRAM(s) Oral every 6 hours PRN Moderate Pain (4 - 6)  dextrose Oral Gel 15 Gram(s) Oral once PRN Blood Glucose LESS THAN 70 milliGRAM(s)/deciliter      Vital Signs Last 24 Hrs  T(F): 97.3 (09-22-22 @ 12:31), Max: 97.8 (09-22-22 @ 04:14)  HR: 58 (09-22-22 @ 12:31) (58 - 67)  BP: 102/62 (09-22-22 @ 12:31) (92/50 - 113/64)  RR: 18 (09-22-22 @ 12:31) (18 - 18)  SpO2: 97% (09-22-22 @ 12:31) (94% - 97%)  Telemetry:   CAPILLARY BLOOD GLUCOSE      POCT Blood Glucose.: 339 mg/dL (22 Sep 2022 17:12)  POCT Blood Glucose.: 343 mg/dL (22 Sep 2022 17:10)  POCT Blood Glucose.: 267 mg/dL (22 Sep 2022 12:32)  POCT Blood Glucose.: 94 mg/dL (22 Sep 2022 09:45)  POCT Blood Glucose.: 56 mg/dL (22 Sep 2022 09:09)  POCT Blood Glucose.: 57 mg/dL (22 Sep 2022 08:51)  POCT Blood Glucose.: 53 mg/dL (22 Sep 2022 08:49)  POCT Blood Glucose.: 126 mg/dL (21 Sep 2022 22:29)    I&O's Summary    21 Sep 2022 07:01  -  22 Sep 2022 07:00  --------------------------------------------------------  IN: 410 mL / OUT: 1100 mL / NET: -690 mL    22 Sep 2022 07:01  -  22 Sep 2022 19:20  --------------------------------------------------------  IN: 180 mL / OUT: 0 mL / NET: 180 mL        PHYSICAL EXAM:  GENERAL: NAD, well-developed  HEAD:  Atraumatic, Normocephalic  EYES: EOMI, PERRLA, conjunctiva and sclera clear  NECK: Supple, No JVD  CHEST/LUNG: Clear to auscultation bilaterally; No wheeze  HEART: Regular rate and rhythm; No murmurs, rubs, or gallops  ABDOMEN: Soft, Nontender, Nondistended; Bowel sounds present  EXTREMITIES:  2+ Peripheral Pulses, No clubbing, cyanosis, or edema  PSYCH: AAOx3  NEUROLOGY: non-focal  SKIN: No rashes or lesions    LABS:                        9.8    10.57 )-----------( 324      ( 21 Sep 2022 07:36 )             34.5     09-22    143  |  104  |  84<H>  ----------------------------<  51<LL>  3.2<L>   |  27  |  1.85<H>    Ca    8.1<L>      22 Sep 2022 07:30  Mg     1.8     09-21      PT/INR - ( 22 Sep 2022 07:30 )   PT: 34.3 sec;   INR: 2.95 ratio         PTT - ( 22 Sep 2022 07:30 )  PTT:34.8 sec          RADIOLOGY & ADDITIONAL TESTS:    Imaging Personally Reviewed:    Consultant(s) Notes Reviewed:      Care Discussed with Consultants/Other Providers:  
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 Endocrinology consulted for hyperglycemia management in the setting of T2DM exacerbated by steroid.      Home diabetes medications:   - According to outpatient records, patient said he doesn't know his dose at home   - Levemir 30 units QHS  - Lispro 3 units TID with meals   Also on crestor 10 mg   Levothyroxine 25 mcg     Inpatient diabetes medications:   - Glargine 26 units at bed time   - Admelog 20 units TID with meals   - [facility]     Most recent HbA1C: 9.1      INTERVAL HPI/OVERNIGHT EVENTS:  Patient is currently on Methylprednisolone 20 mg Q6H, per pulm note this morning, will continue this dose for today.   Seen at the bedside. No complaints. Appetite is very good.  Eating almost 100% of his tray.  Denies nausea or vomiting.       Review of systems:   CONSTITUTIONAL:  Feels well, good appetite  CARDIOVASCULAR:  Negative for chest pain or palpitations  RESPIRATORY:  Negative for cough, or SOB   GASTROINTESTINAL:  Negative for nausea, vomiting, or abdominal pain  GENITOURINARY:  Negative frequency, urgency or dysuria     CAPILLARY BLOOD GLUCOSE    POCT Blood Glucose.: 215 mg/dL (19 Sep 2022 08:36)  POCT Blood Glucose.: 212 mg/dL (18 Sep 2022 22:09)  POCT Blood Glucose.: 176 mg/dL (18 Sep 2022 17:44)  POCT Blood Glucose.: 343 mg/dL (18 Sep 2022 13:26)        MEDICATIONS  (STANDING):  albuterol/ipratropium for Nebulization 3 milliLiter(s) Nebulizer every 6 hours  aspirin enteric coated 81 milliGRAM(s) Oral daily  atorvastatin 80 milliGRAM(s) Oral at bedtime  buDESOnide    Inhalation Suspension 0.5 milliGRAM(s) Inhalation two times a day  cadexomer iodine 0.9% Gel 1 Application(s) Topical daily  dextrose 5%. 1000 milliLiter(s) (100 mL/Hr) IV Continuous <Continuous>  dextrose 5%. 1000 milliLiter(s) (50 mL/Hr) IV Continuous <Continuous>  dextrose 50% Injectable 25 Gram(s) IV Push once  dextrose 50% Injectable 12.5 Gram(s) IV Push once  dextrose 50% Injectable 25 Gram(s) IV Push once  doxycycline IVPB 100 milliGRAM(s) IV Intermittent every 12 hours  doxycycline IVPB      finasteride 5 milliGRAM(s) Oral daily  furosemide   Injectable 40 milliGRAM(s) IV Push every 12 hours  gabapentin 100 milliGRAM(s) Oral two times a day  glucagon  Injectable 1 milliGRAM(s) IntraMuscular once  guaifenesin/dextromethorphan Oral Liquid 10 milliLiter(s) Oral every 6 hours  insulin glargine Injectable (LANTUS) 26 Unit(s) SubCutaneous at bedtime  insulin lispro (ADMELOG) corrective regimen sliding scale   SubCutaneous three times a day before meals  insulin lispro (ADMELOG) corrective regimen sliding scale   SubCutaneous at bedtime  insulin lispro Injectable (ADMELOG) 20 Unit(s) SubCutaneous three times a day before meals  levothyroxine 25 MICROGram(s) Oral daily  methylPREDNISolone sodium succinate Injectable 20 milliGRAM(s) IV Push every 6 hours  metoprolol tartrate 50 milliGRAM(s) Oral two times a day  montelukast 10 milliGRAM(s) Oral at bedtime  multivitamin/minerals 1 Tablet(s) Oral daily  pantoprazole    Tablet 40 milliGRAM(s) Oral before breakfast  piperacillin/tazobactam IVPB.. 3.375 Gram(s) IV Intermittent every 8 hours  polyethylene glycol 3350 17 Gram(s) Oral daily  sacubitril 24 mG/valsartan 26 mG 1 Tablet(s) Oral two times a day  senna 2 Tablet(s) Oral at bedtime  tamsulosin 0.8 milliGRAM(s) Oral at bedtime    MEDICATIONS  (PRN):  acetaminophen     Tablet .. 650 milliGRAM(s) Oral every 6 hours PRN Moderate Pain (4 - 6)  dextrose Oral Gel 15 Gram(s) Oral once PRN Blood Glucose LESS THAN 70 milliGRAM(s)/deciliter        PHYSICAL EXAM   Vital Signs Last 24 Hrs  T(C): 36.4 (19 Sep 2022 04:32), Max: 36.4 (18 Sep 2022 12:30)  T(F): 97.6 (19 Sep 2022 04:32), Max: 97.6 (18 Sep 2022 22:11)  HR: 69 (19 Sep 2022 04:32) (62 - 69)  BP: 135/72 (19 Sep 2022 04:32) (124/60 - 148/68)  BP(mean): --  RR: 18 (19 Sep 2022 04:32) (18 - 18)  SpO2: 97% (19 Sep 2022 04:32) (96% - 97%)    Parameters below as of 19 Sep 2022 04:32  Patient On (Oxygen Delivery Method): nasal cannula           GENERAL: Male laying in bed in NAD  RESPIRATORY: nonlabored breathing, no accessory muscle use  Extremities: Warm, no edema in all 4 exts   NEURO: A&O X3    LABS:   09-19    143  |  102  |  56<H>  ----------------------------<  218<H>  3.4<L>   |  29  |  1.44<H>    Ca    8.7      19 Sep 2022 07:11  Phos  3.4     09-18  Mg     1.8     09-19        PT/INR - ( 18 Sep 2022 06:51 )   PT: 28.4 sec;   INR: 2.43 ratio         PTT - ( 18 Sep 2022 06:51 )  PTT:30.4 sec    Thyroid Stimulating Hormone, Serum: 1.71 uIU/mL (09-16 @ 05:26)       
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 Endocrinology consulted for hyperglycemia management in the setting of T2DM exacerbated by steroid.      Home diabetes medications:   - According to outpatient records, patient said he doesn't know his dose at home   - Levemir 30 units QHS  - Lispro 3 units TID with meals   Also on crestor 10 mg   Levothyroxine 25 mcg     Inpatient diabetes medications:   - Glargine 26 units at bed time   - Admelog 24 units TID with meals   - [facility]     Most recent HbA1C: 9.1      INTERVAL HPI/OVERNIGHT EVENTS:  Steroid tapered to prednisone 50 mg daily for 5 days per pulm note this morning.   Seen at the bedside. Sitting in a chair, ate full breakfast that consists of cereal and egg whites.  No complaints. Appetite is very good.  Denies nausea or vomiting.       Review of systems:   CONSTITUTIONAL:  Feels well, good appetite  CARDIOVASCULAR:  Negative for chest pain or palpitations  RESPIRATORY:  Negative for cough, or SOB   GASTROINTESTINAL:  Negative for nausea, vomiting, or abdominal pain  GENITOURINARY:  Negative frequency, urgency or dysuria     CAPILLARY BLOOD GLUCOSE      POCT Blood Glucose.: 73 mg/dL (20 Sep 2022 09:04)  POCT Blood Glucose.: 116 mg/dL (19 Sep 2022 22:28)  POCT Blood Glucose.: 165 mg/dL (19 Sep 2022 17:46)  POCT Blood Glucose.: 337 mg/dL (19 Sep 2022 12:47)       MEDICATIONS  (STANDING):  albuterol/ipratropium for Nebulization 3 milliLiter(s) Nebulizer every 6 hours  aspirin enteric coated 81 milliGRAM(s) Oral daily  atorvastatin 80 milliGRAM(s) Oral at bedtime  buDESOnide    Inhalation Suspension 0.5 milliGRAM(s) Inhalation two times a day  cadexomer iodine 0.9% Gel 1 Application(s) Topical daily  dextrose 5%. 1000 milliLiter(s) (100 mL/Hr) IV Continuous <Continuous>  dextrose 5%. 1000 milliLiter(s) (50 mL/Hr) IV Continuous <Continuous>  dextrose 50% Injectable 25 Gram(s) IV Push once  dextrose 50% Injectable 12.5 Gram(s) IV Push once  dextrose 50% Injectable 25 Gram(s) IV Push once  doxycycline IVPB 100 milliGRAM(s) IV Intermittent every 12 hours  doxycycline IVPB      finasteride 5 milliGRAM(s) Oral daily  furosemide   Injectable 40 milliGRAM(s) IV Push every 12 hours  gabapentin 100 milliGRAM(s) Oral two times a day  glucagon  Injectable 1 milliGRAM(s) IntraMuscular once  guaifenesin/dextromethorphan Oral Liquid 10 milliLiter(s) Oral every 6 hours  insulin glargine Injectable (LANTUS) 26 Unit(s) SubCutaneous at bedtime  insulin lispro (ADMELOG) corrective regimen sliding scale   SubCutaneous three times a day before meals  insulin lispro (ADMELOG) corrective regimen sliding scale   SubCutaneous at bedtime  insulin lispro Injectable (ADMELOG) 20 Unit(s) SubCutaneous three times a day before meals  levothyroxine 25 MICROGram(s) Oral daily  metoprolol tartrate 50 milliGRAM(s) Oral two times a day  montelukast 10 milliGRAM(s) Oral at bedtime  multivitamin/minerals 1 Tablet(s) Oral daily  pantoprazole    Tablet 40 milliGRAM(s) Oral before breakfast  piperacillin/tazobactam IVPB.. 3.375 Gram(s) IV Intermittent every 8 hours  polyethylene glycol 3350 17 Gram(s) Oral daily  predniSONE   Tablet 50 milliGRAM(s) Oral daily  sacubitril 24 mG/valsartan 26 mG 1 Tablet(s) Oral two times a day  senna 2 Tablet(s) Oral at bedtime  tamsulosin 0.8 milliGRAM(s) Oral at bedtime    MEDICATIONS  (PRN):  acetaminophen     Tablet .. 650 milliGRAM(s) Oral every 6 hours PRN Moderate Pain (4 - 6)  dextrose Oral Gel 15 Gram(s) Oral once PRN Blood Glucose LESS THAN 70 milliGRAM(s)/deciliter          PHYSICAL EXAM   Vital Signs Last 24 Hrs  T(C): 36.4 (20 Sep 2022 11:45), Max: 36.4 (19 Sep 2022 20:40)  T(F): 97.6 (20 Sep 2022 11:45), Max: 97.6 (19 Sep 2022 20:40)  HR: 60 (20 Sep 2022 11:45) (60 - 76)  BP: 108/53 (20 Sep 2022 11:45) (108/53 - 130/66)  BP(mean): --  RR: 18 (20 Sep 2022 11:45) (18 - 18)  SpO2: 97% (20 Sep 2022 11:45) (90% - 100%)    Parameters below as of 20 Sep 2022 11:45  Patient On (Oxygen Delivery Method): nasal cannula        Parameters below as of 19 Sep 2022 04:32  Patient On (Oxygen Delivery Method): nasal cannula           GENERAL: Male sitting in a chair.   RESPIRATORY: nonlabored breathing, no accessory muscle use  Extremities: Warm, no edema in all 4 exts   NEURO: A&O X3    LABS:   09-20    145  |  105  |  63<H>  ----------------------------<  41<LL>  3.4<L>   |  27  |  1.48<H>    Ca    8.5      20 Sep 2022 07:28  Mg     1.8     09-19          PT/INR - ( 18 Sep 2022 06:51 )   PT: 28.4 sec;   INR: 2.43 ratio         PTT - ( 18 Sep 2022 06:51 )  PTT:30.4 sec    Thyroid Stimulating Hormone, Serum: 1.71 uIU/mL (09-16 @ 05:26)       
DIABETES FOLLOW UP NOTE: Saw pt earlier today    Chief Complaint: Endocrine consult requested for management of T2DM    INTERVAL HX: Pt stable, reports tolerating POs but pt very sleepy at time of visit. States feeling tired and doesn't want to be bothered. above goal (200 to 300s) after pt receives Prednisone 50mg in am. No further fasting hypoglycemia. Pt denies s/sx of hypoglycemia.       Review of Systems:  General: As above  Declined to answer.       Allergies    No Known Allergies    Intolerances      MEDICATIONS:  atorvastatin 80 milliGRAM(s) Oral at bedtime  insulin glargine Injectable (LANTUS) 10 Unit(s) SubCutaneous at bedtime  insulin lispro (ADMELOG) corrective regimen sliding scale   SubCutaneous three times a day before meals  insulin lispro (ADMELOG) corrective regimen sliding scale   SubCutaneous at bedtime  insulin lispro Injectable (ADMELOG) 12 Unit(s) SubCutaneous before dinner  insulin lispro Injectable (ADMELOG) 18 Unit(s) SubCutaneous before breakfast  insulin lispro Injectable (ADMELOG) 12 Unit(s) SubCutaneous before lunch  levothyroxine 25 MICROGram(s) Oral daily  predniSONE   Tablet 50 milliGRAM(s) Oral daily    PHYSICAL EXAM:  VITALS: T(C): 36.2 (09-23-22 @ 12:23)  T(F): 97.2 (09-23-22 @ 12:23), Max: 97.5 (09-22-22 @ 21:09)  HR: 61 (09-23-22 @ 12:23) (60 - 67)  BP: 90/50 (09-23-22 @ 12:23) (90/50 - 118/52)  RR:  (18 - 18)  SpO2:  (95% - 98%)  Wt(kg): --  GENERAL: Male laying in bed in NAD  Abdomen: Soft, nontender, non distended, central adiposity  Extremities: Warm, no edema in all 4 exts with chronic vascular changes in LLE> darker skin discoloration and dryness. R BKA healed intact, L foot dressing D&I  NEURO: Sleepy, declines to answer most questions.       LABS:  POCT Blood Glucose.: 234 mg/dL (09-23-22 @ 12:48)  POCT Blood Glucose.: 182 mg/dL (09-23-22 @ 08:32)  POCT Blood Glucose.: 288 mg/dL (09-22-22 @ 21:35)  POCT Blood Glucose.: 339 mg/dL (09-22-22 @ 17:12)  POCT Blood Glucose.: 343 mg/dL (09-22-22 @ 17:10)  POCT Blood Glucose.: 267 mg/dL (09-22-22 @ 12:32)  POCT Blood Glucose.: 94 mg/dL (09-22-22 @ 09:45)  POCT Blood Glucose.: 56 mg/dL (09-22-22 @ 09:09)  POCT Blood Glucose.: 57 mg/dL (09-22-22 @ 08:51)  POCT Blood Glucose.: 53 mg/dL (09-22-22 @ 08:49)  POCT Blood Glucose.: 126 mg/dL (09-21-22 @ 22:29)  POCT Blood Glucose.: 128 mg/dL (09-21-22 @ 17:46)  POCT Blood Glucose.: 152 mg/dL (09-21-22 @ 12:27)  POCT Blood Glucose.: 115 mg/dL (09-21-22 @ 08:41)  POCT Blood Glucose.: 102 mg/dL (09-20-22 @ 21:55)  POCT Blood Glucose.: 130 mg/dL (09-20-22 @ 17:29)                            9.8    12.65 )-----------( 299      ( 23 Sep 2022 07:11 )             34.4       09-23    142  |  103  |  94<H>  ----------------------------<  168<H>  4.0   |  26  |  1.70<H>    eGFR: 39<L>    Ca    8.3<L>      09-23  Mg     1.8     09-21      Thyroid Function Tests:  09-16 @ 05:26 TSH 1.71 FreeT4 0.9 T3 -- Anti TPO -- Anti Thyroglobulin Ab -- TSI --      A1C with Estimated Average Glucose Result: 9.1 % (09-15-22 @ 07:22)      Estimated Average Glucose: 214 mg/dL (09-15-22 @ 07:22)        09-15 Chol 91 Direct LDL -- LDL calculated 44 HDL 26<L> Trig 107              
Diabetes Follow up note:    Chief complaint: T2DM/steroid induced hyperglycemia    Interval Hx: Hypoglycemic to 50s this AM. Pt reports non-symptomatic this AM. Pt not eating snack at bedtime. Ate breakfast w/good appetite. On prednisone 50mg day. Reports breathing improving.     Review of Systems:  General: denies pain  GI: Tolerating POs. Denies N/V/D/Abd pain  CV: Denies CP/SOB  ENDO: No S&Sx of hypoglycemia    MEDS:  atorvastatin 80 milliGRAM(s) Oral at bedtime  finasteride 5 milliGRAM(s) Oral daily  insulin glargine Injectable (LANTUS) 20 Unit(s) SubCutaneous at bedtime  insulin lispro (ADMELOG) corrective regimen sliding scale   SubCutaneous at bedtime  insulin lispro (ADMELOG) corrective regimen sliding scale   SubCutaneous three times a day before meals  insulin lispro Injectable (ADMELOG) 20 Unit(s) SubCutaneous before breakfast  insulin lispro Injectable (ADMELOG) 15 Unit(s) SubCutaneous before lunch  insulin lispro Injectable (ADMELOG) 15 Unit(s) SubCutaneous before dinner  levothyroxine 25 MICROGram(s) Oral daily  predniSONE   Tablet 50 milliGRAM(s) Oral daily      Allergies    No Known Allergies      PE:  General: Male lying in bed. NAD.   Vital Signs Last 24 Hrs  T(C): 36.6 (22 Sep 2022 04:14), Max: 36.6 (22 Sep 2022 04:14)  T(F): 97.8 (22 Sep 2022 04:14), Max: 97.8 (22 Sep 2022 04:14)  HR: 60 (22 Sep 2022 04:14) (59 - 67)  BP: 107/66 (22 Sep 2022 04:14) (92/50 - 113/64)  BP(mean): --  RR: 18 (22 Sep 2022 04:14) (18 - 18)  SpO2: 96% (22 Sep 2022 04:14) (94% - 99%)    Parameters below as of 22 Sep 2022 04:14  Patient On (Oxygen Delivery Method): nasal cannula    Abd: Soft, NT,ND,   Extremities: Warm. R BKA  Neuro: A&O X3    LABS:  POCT Blood Glucose.: 94 mg/dL (09-22-22 @ 09:45)  POCT Blood Glucose.: 56 mg/dL (09-22-22 @ 09:09)  POCT Blood Glucose.: 57 mg/dL (09-22-22 @ 08:51)  POCT Blood Glucose.: 53 mg/dL (09-22-22 @ 08:49)  POCT Blood Glucose.: 126 mg/dL (09-21-22 @ 22:29)  POCT Blood Glucose.: 128 mg/dL (09-21-22 @ 17:46)  POCT Blood Glucose.: 152 mg/dL (09-21-22 @ 12:27)  POCT Blood Glucose.: 115 mg/dL (09-21-22 @ 08:41)  POCT Blood Glucose.: 102 mg/dL (09-20-22 @ 21:55)  POCT Blood Glucose.: 130 mg/dL (09-20-22 @ 17:29)  POCT Blood Glucose.: 101 mg/dL (09-20-22 @ 12:58)  POCT Blood Glucose.: 73 mg/dL (09-20-22 @ 09:04)  POCT Blood Glucose.: 116 mg/dL (09-19-22 @ 22:28)  POCT Blood Glucose.: 165 mg/dL (09-19-22 @ 17:46)  POCT Blood Glucose.: 337 mg/dL (09-19-22 @ 12:47)                            9.8    10.57 )-----------( 324      ( 21 Sep 2022 07:36 )             34.5       09-22    143  |  104  |  84<H>  ----------------------------<  51<LL>  3.2<L>   |  27  |  1.85<H>    eGFR: 35<L>    Ca    8.1<L>      09-22  Mg     1.8     09-21        Thyroid Function Tests:  09-16 @ 05:26 TSH 1.71 FreeT4 0.9 T3 -- Anti TPO -- Anti Thyroglobulin Ab -- TSI --      A1C with Estimated Average Glucose Result: 9.1 % (09-15-22 @ 07:22)  A1C with Estimated Average Glucose Result: 6.8 % (04-02-22 @ 12:21)          Contact number: daily [phone] or [phone]

## 2022-09-23 NOTE — DIETITIAN INITIAL EVALUATION ADULT - ORAL INTAKE PTA/DIET HISTORY
cereal for breakfast, jelly sandwich for lunch, pasta, meat for dinner. lives with daughter and she prepares his meals.

## 2022-09-23 NOTE — PROGRESS NOTE ADULT - PROBLEM SELECTOR PLAN 1
- Test BG TID AC & QHS while eating regular meals and Q6H while NPO  - C/w Glargine 10 units QHS  - Change insulin Lispro to 20-14-14  units TID with meals, hold if NPO or if eating less than 50% of meals  - C/w Admelog Correctional scale TID with meals and hs to  low dose due to CKD   - Needs RD consult with wife  Discharge:  - Will be determined according to BG/insulin needs/PO intake and steroid therapy at time of discharge.  -Likely Levemir QHS and - Lispro  TID with meals. Doses TBD> insulin given by patient's wife   - Will need outpatient follow up with nephrology, opthalmology   - Can follow up with primary care for glucose management. Endo practice if pt/family wishes at [address] [facility] [address] Phone .  -Make sure pt has Rx for all DM supplies and insulin/ DM meds. - Test BG TID AC & QHS while eating regular meals and Q6H while NPO  - C/w Glargine 10 units QHS  - Change insulin Lispro to 20-14-14  units TID with meals, hold if NPO or if eating less than 50% of meals  - C/w Admelog Correctional scale TID with meals and hs to  low dose due to CKD   - Needs RD consult with wife  Discharge:  - Spoke to primary team. Pt going to rehab this pm on present insulin doses. However, pt only has one more day of steroid therapy (9/24) and then will be off steroids starting 9/25. Spoke to NP Leti, Once pt is off steroids need to c/w Lantus 10 units qhs but decrease Admelog to 5 units ac meals. Might need further insulin adjustments while in rehab.    -Once going home need to evaluate Levemir QHS and - Lispro  TID with meals doses with wife since she is pt's care giver.    - Will need outpatient follow up with nephrology, opthalmology   - Can follow up with primary care for glucose management. Endo practice if pt/family wishes at [address] [facility] [address]. Phone .  -Make sure pt has Rx for all DM supplies and insulin/ DM meds.

## 2022-09-23 NOTE — PROGRESS NOTE ADULT - PROBLEM SELECTOR PLAN 2
- BP goal 130/80  - Manage per primary team
BP goal 130/80  - Manage per primary team.
- BP goal 130/80  - Manage per primary team

## 2022-09-23 NOTE — PROGRESS NOTE ADULT - REASON FOR ADMISSION
acute rayshawn chronic diastolic CHF exacerbation, Pneumonia
acute on chronic diastolic CHF exacerbation, Pneumonia
acute rayshawn chronic diastolic CHF exacerbation, Pneumonia

## 2022-09-23 NOTE — DIETITIAN INITIAL EVALUATION ADULT - PERTINENT MEDS FT
MEDICATIONS  (STANDING):  acetylcysteine 20%  Inhalation 4 milliLiter(s) Inhalation three times a day  albuterol/ipratropium for Nebulization 3 milliLiter(s) Nebulizer every 6 hours  aspirin enteric coated 81 milliGRAM(s) Oral daily  atorvastatin 80 milliGRAM(s) Oral at bedtime  buDESOnide    Inhalation Suspension 0.5 milliGRAM(s) Inhalation two times a day  cadexomer iodine 0.9% Gel 1 Application(s) Topical daily  dextrose 5%. 1000 milliLiter(s) (50 mL/Hr) IV Continuous <Continuous>  dextrose 5%. 1000 milliLiter(s) (100 mL/Hr) IV Continuous <Continuous>  dextrose 50% Injectable 25 Gram(s) IV Push once  dextrose 50% Injectable 25 Gram(s) IV Push once  dextrose 50% Injectable 12.5 Gram(s) IV Push once  finasteride 5 milliGRAM(s) Oral daily  gabapentin 100 milliGRAM(s) Oral two times a day  glucagon  Injectable 1 milliGRAM(s) IntraMuscular once  guaifenesin/dextromethorphan Oral Liquid 10 milliLiter(s) Oral every 6 hours  insulin glargine Injectable (LANTUS) 10 Unit(s) SubCutaneous at bedtime  insulin lispro (ADMELOG) corrective regimen sliding scale   SubCutaneous three times a day before meals  insulin lispro (ADMELOG) corrective regimen sliding scale   SubCutaneous at bedtime  insulin lispro Injectable (ADMELOG) 12 Unit(s) SubCutaneous before dinner  insulin lispro Injectable (ADMELOG) 18 Unit(s) SubCutaneous before breakfast  insulin lispro Injectable (ADMELOG) 12 Unit(s) SubCutaneous before lunch  levothyroxine 25 MICROGram(s) Oral daily  metoprolol tartrate 50 milliGRAM(s) Oral two times a day  montelukast 10 milliGRAM(s) Oral at bedtime  multivitamin/minerals 1 Tablet(s) Oral daily  pantoprazole    Tablet 40 milliGRAM(s) Oral before breakfast  polyethylene glycol 3350 17 Gram(s) Oral daily  predniSONE   Tablet 50 milliGRAM(s) Oral daily  senna 2 Tablet(s) Oral at bedtime  sodium chloride 3%  Inhalation 4 milliLiter(s) Inhalation every 12 hours  tamsulosin 0.8 milliGRAM(s) Oral at bedtime  warfarin 3 milliGRAM(s) Oral once    MEDICATIONS  (PRN):  acetaminophen     Tablet .. 650 milliGRAM(s) Oral every 6 hours PRN Moderate Pain (4 - 6)  dextrose Oral Gel 15 Gram(s) Oral once PRN Blood Glucose LESS THAN 70 milliGRAM(s)/deciliter

## 2022-09-23 NOTE — DIETITIAN INITIAL EVALUATION ADULT - OTHER CALCULATIONS
fluid deferred to MD due to HF fluid deferred to MD due to HF  IBW+10% taking into consideration BKA

## 2022-09-23 NOTE — PROGRESS NOTE ADULT - ASSESSMENT
TTE with Doppler (w/Cont) (04.03.22 @ 15:07) >  mild aortic stenosis. . Mild left ventricular systolic dysfunction. The inferior wall, and the inferoseptum are hypokinetic.  Echo 9/15/22: .EF 35-40% Severe segmental left ventricular systolic dysfunction. The mid to distal anteroseptum and severely hypokinetic. The mid to basal inferolateral wall is hypokinetic.      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 chf   -cta chest with no pe, Moderate left and small right pleural effusions, increased since May  2022, with worsening lower lobe compressive atelectasis. New right lung patchy and nodular areas of consolidation, likely  infectious/inflammatory  -Pulm fu, IV steroids, IV abx,  sp thoracentesis   -diuretics on hold  -echo with .EF 35-40% Severe segmental left ventricular systolic dysfunction. The mid to distal anteroseptum and severely hypokinetic. The mid to basal inferolateral wall is hypokinetic.    # acute on chronic systolic CHF   -diuretics on hold resume per renal  -repeat echo with moderate severe lv dysfxn, unchanged from echo  4/2022  -continue medical management of CMP/HF  -ecg, no ischemic changes, HS trop elevated secondary to hypoxia/ chf/ resp infection   -c/w bb     #Pafib, sp PPM     -c/w BB /ac       #mild as   -echo stable         35 minutes spent on total encounter; more than 50% of the visit was spent counseling and/or coordinating care by the attending physician.

## 2022-09-23 NOTE — DISCHARGE NOTE PROVIDER - CARE PROVIDER_API CALL
Sarabjit Wright)  Internal Medicine; Pulmonary Disease  [address] [place] [address], [place] [address]  Phone: [phone]  Fax: [phone]  Follow Up Time:

## 2022-09-23 NOTE — DISCHARGE NOTE NURSING/CASE MANAGEMENT/SOCIAL WORK - NSDCPEPTCOWADIET_GEN_ALL_CORE
Keep your intake of vitamin K regular. The highest amount of vitamin K is found in green and leafy vegetables like broccoli, lettuces, cabbage, and spinach. You can eat these foods but keep the portion size the same. Changes in the amount you eat can affect your PT/INR blood test. Contact your doctor before making any major changes in your diet. Limit your alcohol intake. Review of Systems:  	•	CONSTITUTIONAL: no fever  	•	SKIN: no rash  	•	EYES: no eye pain, no blurry vision  	•	ENT: no change in hearing, no tinnitus   	•	RESPIRATORY: no shortness of breath, no cough  	•	CARDIAC: no chest pain, no palpitations  	•	GI: no abd pain, +nausea, no vomiting  	•	MUSCULOSKELETAL: no joint paint, no swelling, no redness  	•	NEUROLOGIC: +head injury, no loc, no weakness  	•	PSYCH: no anxiety, non suicidal, non homicidal, no hallucination, no depression

## 2022-09-23 NOTE — PROGRESS NOTE ADULT - NSPROGADDITIONALINFOA_GEN_ALL_CORE
-Plan discussed with pt/team.  Contact info: [phone] (24/7). pager [phone]  [email or web address] password [email or web address]  Spent 27 assessing pt/labs/meds and discussing plan of care with primary team  Adjusting insulin  Discharge plan  Follow up care -Plan discussed with pt/team.  Contact info: [phone] (24/7). pager [phone]  [email or web address] password [email or web address]  Spent 30 assessing pt/labs/meds and discussing discharge plan of care with primary team  Adjusting insulin  Discharge plan  Follow up care

## 2022-09-23 NOTE — DIETITIAN INITIAL EVALUATION ADULT - NS FNS DIET ORDER
Diet, DASH/TLC:   Sodium & Cholesterol Restricted  Consistent Carbohydrate {Evening Snack} (CSTCHOSN)  Moderately Thick Liquids (MODTHICKLIQS) (09-15-22 @ 13:23) [Active]

## 2022-09-23 NOTE — PROGRESS NOTE ADULT - PROBLEM SELECTOR PROBLEM 2
Hypertension
Steroid-induced hyperglycemia
Steroid-induced hyperglycemia
Hypertension
Steroid-induced hyperglycemia
Hypertension

## 2022-09-23 NOTE — DISCHARGE NOTE PROVIDER - NSDCFUSCHEDAPPT_GEN_ALL_CORE_FT
[facility] Physician [facility] [phone]  Scheduled Appointment: 12/09/2022

## 2022-09-23 NOTE — PROGRESS NOTE ADULT - ASSESSMENT
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, T2DM, CHF on lasix (recently decreased dose 2 days ago 2/2 hypernatremia), presents to the ED c/o increasing shortness of breath and "gurgling" sounds in chest, admitted for hypercapnic respiratory failure in the setting of aspiration pneumonia, COPD exacerbation, restrictive lung disease as well as acute on chronic systolic CHF now with KARLOS      1) KARLOS  b/l cr previously was around 1.7mg/dl however from recently hospitalization pts new baseline around 1mg/dl likely 2/2 muscle loss  ddx includes karlos 2/2 hemodynamic effects vs cardiorenal  cont with hudson from previous retention  Creatinine improving S/P DC of Entresto and Lasix  Rising BUN suspected to be due to steroid  avoid iv contrast   trend bmp    2) Hypokalemia-   resolved  trend k      For any question, call:  Cell # [phone]  Pager # [phone]  Callback # [phone]

## 2022-10-24 NOTE — ED ADULT NURSE NOTE - CHIEF COMPLAINT QUOTE
Pt. brought from [facility] for becoming more lethargic today. Pt. states "everything is bothering him". EMS states there were no nurses to get report. On 3L NC. h/o COPD, DM, pacemaker

## 2022-10-24 NOTE — ED PROVIDER NOTE - PHYSICAL EXAMINATION
Gen: NAD. A&Ox4.  HEENT: Normocephalic and atraumatic. PERRL, EOMI, no nasal discharge, mucous membranes moist, no scleral icterus.  CV: Paced rhythm. Regular rate. S1/S2, no M/R/G. 3+ b/l pitting edema. Radial pulses present and symmetrical.  Resp: Increased effort and rate. Diffuse crackles heard in all lung fields. Satting 97% on 4L O2 via NC.  GI: Abdomen soft, non-distended, non tender to palpation. No masses appreciated.  MSK: Right sided below knee amputation. LUE with moderate swelling, erythema, and warmth to touch.  Neuro: Following commands, speaking in full sentences, moving extremities spontaneously  Psych: Appropriate mood, cooperative

## 2022-10-24 NOTE — ED PROVIDER NOTE - CARE PLAN
Principal Discharge DX:	CHF exacerbation   1 Principal Discharge DX:	CHF exacerbation  Secondary Diagnosis:	Acute respiratory failure with hypoxia  Secondary Diagnosis:	COPD, severe  Secondary Diagnosis:	Pleural effusion   Principal Discharge DX:	CHF exacerbation  Secondary Diagnosis:	Acute respiratory failure with hypoxia  Secondary Diagnosis:	COPD, severe  Secondary Diagnosis:	Pleural effusion  Secondary Diagnosis:	Elevated troponin level

## 2022-10-24 NOTE — ED ADULT NURSE NOTE - NSIMPLEMENTINTERV_GEN_ALL_ED
Implemented All Fall with Harm Risk Interventions:  [place] to call system. Call bell, personal items and telephone within reach. Instruct patient to call for assistance. Room bathroom lighting operational. Non-slip footwear when patient is off stretcher. Physically safe environment: no spills, clutter or unnecessary equipment. Stretcher in lowest position, wheels locked, appropriate side rails in place. Provide visual cue, wrist band, yellow gown, etc. Monitor gait and stability. Monitor for mental status changes and reorient to person, place, and time. Review medications for side effects contributing to fall risk. Reinforce activity limits and safety measures with patient and family. Provide visual clues: red socks.

## 2022-10-24 NOTE — ED PROVIDER NOTE - CLINICAL SUMMARY MEDICAL DECISION MAKING FREE TEXT BOX
Jewel Hummel MD (PGY-3): Jewel Hummel MD (PGY-3): The patient is a 87y Male with pmhx of severe systolic CHF, b/l pleural effusion s/p thoracentesis in the past, SSS w/ pacemaker w/ f/u w/ EP, CVA w/ residual left sided UE weakness, chronic Adair, BPH, COPD (on 2L O2 at baseline), restrictive lung dx 2/2 obesity, right AKA, hypothyroidism, Afib on coumadin who presents from [facility] with lethargy and sob. Concern for CHF exacerbation, mixed picture with COPD exacerbation, ACS, PE. Lasix, duonebs, solu-medrol, labs, cxr, CTA chest, ekg, BIPAP for increased WOB, MICU consult. Pt will be admitted to MICU vs. floor.

## 2022-10-24 NOTE — ED ADULT TRIAGE NOTE - O2 FLOW (L/MIN)
Spoke with patient informed him medication was approved and sent to his pharmacy with no refills. Patient will call back to schedule appointment for his refills. 3

## 2022-10-24 NOTE — ED ADULT TRIAGE NOTE - CHIEF COMPLAINT QUOTE
Pt. brought from [facility] for becoming more lethargic today. Pt. states "everything is bothering him". EMS states there were no nurses to get report. On 3L NC. h/o COPD, DM, pacemaker

## 2022-10-24 NOTE — ED PROVIDER NOTE - NSICDXPASTMEDICALHX_GEN_ALL_CORE_FT
PAST MEDICAL HISTORY:  BPH (benign prostatic hyperplasia)     Cerebrovascular accident (CVA)     Chronic atrial fibrillation     Chronic systolic congestive heart failure     COPD, severe     H/O sick sinus syndrome     Hypothyroid

## 2022-10-24 NOTE — ED PROVIDER NOTE - ATTENDING CONTRIBUTION TO CARE
I have personally performed a face to face medical and diagnostic evaluation of the patient. I have discussed with and reviewed the Resident's note and agree with the History, ROS, Physical Exam and MDM unless otherwise indicated. A brief summary of my personal evaluation and impression can be found below.    Charito CAMEJO: 87M hx of severe systolic CHF, b/l pleural effusion s/p thoracentesis in the past, SSS w/ pacemaker w/ f/u w/ EP, CVA w/ residual left sided UE weakness, chronic Adair, BPH, COPD (on 2L O2 at baseline), restrictive lung dx 2/2 obesity, right AKA, hypothyroidism, Afib on coumadin who presents from [facility] with lethargy and sob, per EMS sob a/w lethargy and difficult to on arrival pt notes cough otherwise has no complaints. Daughter at beside reports pt recently had a similar episode and was admitted to [facility], notes swelling to LUE that she thinks is new. Pt has never  required bipap in past.     ROS limited secondary to AMS(?) critical pt condition requiring intervention.    VITALS: Initial triage and subsequent vitals have been reviewed by me.  GEN APPEARANCE: WDWN, acute on chronically ill appearing, resp distress, belly breathing, audible crackles when talking  HEAD: Atraumatic.  EYES: PERRLa, EOMI, vision grossly intact.   NECK: Supple  CV: RRR, S1S2, no c/r/m/g. Cap refill <2 seconds. No bruits.   LUNGS: diffuse crackles  ABDOMEN: Soft, NTND. No guarding or rebound.   MSK/EXT: No spinal or extremity point tenderness. No CVA ttp. Pelvis stable. No peripheral edema. R BKA, LUE w mild warmth erythema around medial upper arm  NEURO: Alert, follows commands.. Speech normal. Sensation and motor normal x4 extremities.   SKIN: Warm, dry and intact. No rash.  PSYCH: Appropriate    Plan/MDM: Charito CAMEJO: 87M hx of severe systolic CHF, b/l pleural effusion s/p thoracentesis in the past, SSS w/ pacemaker w/ f/u w/ EP, CVA w/ residual left sided UE weakness, chronic Aadir, BPH, COPD (on 2L O2 at baseline), restrictive lung dx 2/2 obesity, right AKA, hypothyroidism, Afib on coumadin who presents from [facility] with lethargy and sob, per EMS sob a/w lethargy and difficult to on arrival pt notes cough otherwise has no complaints. exam ill appearing w obvious resp dsitress and crackles ddx c/f fluid overload chf exacerbation  vs pe vs acs, hypoxic, will check labs cxr cta for pe, ekg, cardaics, start bipap disucss w micu, admit I have personally performed a face to face medical and diagnostic evaluation of the patient. I have discussed with and reviewed the Resident's note and agree with the History, ROS, Physical Exam and MDM unless otherwise indicated. A brief summary of my personal evaluation and impression can be found below.    Charito CAMEJO: 87M hx of severe systolic CHF, b/l pleural effusion s/p thoracentesis in the past, SSS w/ pacemaker w/ f/u w/ EP, CVA w/ residual left sided UE weakness, chronic Adair, BPH, COPD (on 2L O2 at baseline), restrictive lung dx 2/2 obesity, right AKA, hypothyroidism, Afib on coumadin who presents from [facility] with lethargy and sob, per EMS sob a/w lethargy and difficult to on arrival pt notes cough otherwise has no complaints. Daughter at beside reports pt recently had a similar episode and was admitted to [facility], notes swelling to LUE that she thinks is new. Pt has never  required bipap in past. Daughter reports pt has required drain for lungs in past.      ROS limited secondary to AMS(?) critical pt condition requiring intervention.    VITALS: Initial triage and subsequent vitals have been reviewed by me.  GEN APPEARANCE: WDWN, acute on chronically ill appearing, resp distress, belly breathing, audible crackles when talking  HEAD: Atraumatic.  EYES: PERRLa, EOMI, vision grossly intact.   NECK: Supple  CV: RRR, S1S2, no c/r/m/g. Cap refill <2 seconds. No bruits.   LUNGS: diffuse crackles  ABDOMEN: Soft, NTND. No guarding or rebound.   MSK/EXT: No spinal or extremity point tenderness. No CVA ttp. Pelvis stable. No peripheral edema. R BKA, LUE w mild warmth erythema around medial upper arm  NEURO: Alert, follows commands.. Speech normal. Sensation and motor normal x4 extremities.   SKIN: Warm, dry and intact. No rash.  PSYCH: Appropriate    Plan/MDM: Charito CAMEJO: 87M hx of severe systolic CHF, b/l pleural effusion s/p thoracentesis in the past, SSS w/ pacemaker w/ f/u w/ EP, CVA w/ residual left sided UE weakness, chronic Adair, BPH, COPD (on 2L O2 at baseline), restrictive lung dx 2/2 obesity, right AKA, hypothyroidism, Afib on coumadin who presents from [facility] with lethargy and sob, per EMS sob a/w lethargy and difficult to on arrival pt notes cough otherwise has no complaints. exam ill appearing w obvious resp dsitress and crackles ddx c/f fluid overload chf exacerbation  vs pe vs acs, hypoxic, will check labs cxr cta for pe, ekg, cardaics, start bipap disucss w micu, admit

## 2022-10-24 NOTE — ED PROVIDER NOTE - PROGRESS NOTE DETAILS
Charito CAMEJO: (Back Charting) seen by both micu and cards, brice does not think nstemi at this time, not a micu candidate, will arrange for admission improved on bipap.

## 2022-10-24 NOTE — ED PROVIDER NOTE - OBJECTIVE STATEMENT
The patient is a 87y Male with pmhx of severe systolic CHF, b/l pleural effusion s/p thoracentesis in the past, SSS w/ pacemaker w/ f/u w/ EP, CVA w/ residual left sided UE weakness, chronic Adair, BPH, COPD (on 2L O2 at baseline), restrictive lung dx 2/2 obesity, right AKA, hypothyroidism, Afib on coumadin who presents from [facility] with lethargy and sob. Per EMS, pt was having increased work of breathing at [place] and difficult to arouse, even with sternal rub. EMS said that his hemoglobin has been trending down and WBC has been trending up. Pt is currently A&Ox3 in ED. Pt denies any acute complaints at this time. Per daughters, no recent episodes of fevers, diarrhea, headaches, or falls. NKDA.

## 2022-10-25 NOTE — CONSULT NOTE ADULT - SUBJECTIVE AND OBJECTIVE BOX
CHIEF COMPLAINT:    HPI:    PAST MEDICAL & SURGICAL HISTORY:      FAMILY HISTORY:      SOCIAL HISTORY:  Smoking: __ packs x ___ years  EtOH Use:  Marital Status:  Occupation:  Recent Travel:  Country of Birth:  Advance Directives:    Allergies    No Known Allergies    Intolerances        HOME MEDICATIONS:  Per nursing home documents:     REVIEW OF SYSTEMS:  [x] Unable to assess ROS because of lethargy    OBJECTIVE:  ICU Vital Signs Last 24 Hrs  T(C): 36.5 (24 Oct 2022 19:36), Max: 36.5 (24 Oct 2022 19:36)  T(F): 97.7 (24 Oct 2022 19:36), Max: 97.7 (24 Oct 2022 19:36)  HR: 88 (24 Oct 2022 21:30) (86 - 88)  BP: 145/77 (24 Oct 2022 19:36) (145/77 - 145/77)  BP(mean): --  ABP: --  ABP(mean): --  RR: 20 (24 Oct 2022 19:36) (20 - 20)  SpO2: 96% (24 Oct 2022 21:30) (95% - 96%)    O2 Parameters below as of 24 Oct 2022 19:36  Patient On (Oxygen Delivery Method): nasal cannula  O2 Flow (L/min): 3            CAPILLARY BLOOD GLUCOSE      POCT Blood Glucose.: 108 mg/dL (24 Oct 2022 19:47)      PHYSICAL EXAM:       HOSPITAL MEDICATIONS:  MEDICATIONS  (STANDING):    MEDICATIONS  (PRN):      LABS:                        8.2    8.67  )-----------( 267      ( 24 Oct 2022 21:30 )             30.0         156<H>  |  112<H>  |  34<H>  ----------------------------<  63<L>  3.8   |  35<H>  |  0.98    Ca    8.9      24 Oct 2022 21:30  Mg     1.80     10-24    TPro  5.9<L>  /  Alb  2.5<L>  /  TBili  0.5  /  DBili  x   /  AST  13  /  ALT  7   /  AlkPhos  93  10-24    PT/INR - ( 24 Oct 2022 23:05 )   PT: 24.2 sec;   INR: 2.07 ratio         PTT - ( 24 Oct 2022 23:05 )  PTT:34.2 sec      Venous Blood Gas:  10-24 @ 21:30  7.39/65/35/39/49.6  VBG Lactate: 1.6      MICROBIOLOGY:     RADIOLOGY:  [ ] Reviewed and interpreted by me    EKG: CHIEF COMPLAINT:    HPI:    PAST MEDICAL & SURGICAL HISTORY:      FAMILY HISTORY:      SOCIAL HISTORY:  Smoking: __ packs x ___ years  EtOH Use:  Marital Status:  Occupation:  Recent Travel:  Country of Birth:  Advance Directives:    Allergies    No Known Allergies    Intolerances        HOME MEDICATIONS:  Per nursing home documents:   tamsumulin, synthroid, protonix  Motelukast, budesonide, duonebs  Metoprolol tartrate   aspirin  atorvastatin   lasix 20mg  warfarin   Insulin     REVIEW OF SYSTEMS:  [x] Unable to assess ROS because of lethargy    OBJECTIVE:  ICU Vital Signs Last 24 Hrs  T(C): 36.5 (24 Oct 2022 19:36), Max: 36.5 (24 Oct 2022 19:36)  T(F): 97.7 (24 Oct 2022 19:36), Max: 97.7 (24 Oct 2022 19:36)  HR: 88 (24 Oct 2022 21:30) (86 - 88)  BP: 145/77 (24 Oct 2022 19:36) (145/77 - 145/77)    RR: 20 (24 Oct 2022 19:36) (20 - 20)  SpO2: 96% (24 Oct 2022 21:30) (95% - 96%)    O2 Parameters below as of 24 Oct 2022 19:36  Patient On (Oxygen Delivery Method): nasal cannula  O2 Flow (L/min): 3        PHYSICAL EXAM:  GENERAL: NAD  HEENT: NC/AT, EOMI, PERRL  NECK: Supple, No JVD  CHEST/LUNG: Diminished breath sounds on the left. No wheezes, no increased WOB  HEART: RRR, no m/r/g  ABDOMEN: soft, NT, ND, BS+  EXTREMITIES:  2+ peripheral pulses, no clubbing, 3+ pitting edema   NERVOUS SYSTEM: A&Ox2-3, nods to questions and follows commands   MSK: FROM all 4 extremities, full and equal strength  SKIN: No rashes or lesions    HOSPITAL MEDICATIONS:  MEDICATIONS  (STANDING):    MEDICATIONS  (PRN):      LABS:                        8.2    8.67  )-----------( 267      ( 24 Oct 2022 21:30 )             30.0         156<H>  |  112<H>  |  34<H>  ----------------------------<  63<L>  3.8   |  35<H>  |  0.98    Ca    8.9      24 Oct 2022 21:30  Mg     1.80     10-24    TPro  5.9<L>  /  Alb  2.5<L>  /  TBili  0.5  /  DBili  x   /  AST  13  /  ALT  7   /  AlkPhos  93  10-24    PT/INR - ( 24 Oct 2022 23:05 )   PT: 24.2 sec;   INR: 2.07 ratio         PTT - ( 24 Oct 2022 23:05 )  PTT:34.2 sec      Venous Blood Gas:  10-24 @ 21:30  7.39/65/35/39/49.6  VBG Lactate: 1.6      MICROBIOLOGY:     RADIOLOGY:  [ ] Reviewed and interpreted by me    EKG: CHIEF COMPLAINT: weakness    HPI:    88 yo male w h/o asthma/COPD, ZACKARY, HTN, HLD, DM, CKD, 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 Per EMS no nurses to get report. Currently, patient lethargic but  nods to questions and follows commands. On BIPAP 10/5, 40% FiO2. CXR with left sided pleural effusion. Troponin elevated 288 -> 290. Does not appear to be in distress.     Patient has duplicate chart. MRN [id number]. Most recent admission from 09/14-09/23 at [facility] for acute hypoxemia and chronic hypercapnic respiratory failure, with bilateral left > right pleural effusions with atelectasis. Underwent thoracentesis x2. TTE from [id number] with severe segmental left ventricular systolic dysfunction (mid to distal anteroseptum is severely hypokinetic. the mid to basal inferolateral wall is hypokinetic). On 20mg lasix daily per nursing home notes.     Patient followed by Dr. Alicja Rob (pulm) and Dr. Sarabjit Stephens ([facility])    PAST MEDICAL & SURGICAL HISTORY:      FAMILY HISTORY:      SOCIAL HISTORY:  Smoking: __ packs x ___ years  EtOH Use:  Marital Status:  Occupation:  Recent Travel:  Country of Birth:  Advance Directives:    Allergies    No Known Allergies    Intolerances        HOME MEDICATIONS:  Per nursing home documents:   tamsumulin, synthroid, protonix  Motelukast, budesonide, duonebs  Metoprolol tartrate   aspirin  atorvastatin   lasix 20mg  warfarin   Insulin     REVIEW OF SYSTEMS:  [x] Unable to assess ROS because of lethargy    OBJECTIVE:  ICU Vital Signs Last 24 Hrs  T(C): 36.5 (24 Oct 2022 19:36), Max: 36.5 (24 Oct 2022 19:36)  T(F): 97.7 (24 Oct 2022 19:36), Max: 97.7 (24 Oct 2022 19:36)  HR: 88 (24 Oct 2022 21:30) (86 - 88)  BP: 145/77 (24 Oct 2022 19:36) (145/77 - 145/77)    RR: 20 (24 Oct 2022 19:36) (20 - 20)  SpO2: 96% (24 Oct 2022 21:30) (95% - 96%)    O2 Parameters below as of 24 Oct 2022 19:36  Patient On (Oxygen Delivery Method): nasal cannula  O2 Flow (L/min): 3        PHYSICAL EXAM:  GENERAL: NAD  HEENT: NC/AT, EOMI, PERRL  NECK: Supple, No JVD  CHEST/LUNG: Diminished breath sounds on the left. No wheezes, no increased WOB  HEART: RRR, no m/r/g  ABDOMEN: soft, NT, ND, BS+  EXTREMITIES:  2+ peripheral pulses, no clubbing, 3+ pitting edema   NERVOUS SYSTEM: A&Ox2-3, nods to questions and follows commands   MSK: FROM all 4 extremities, full and equal strength  SKIN: No rashes or lesions    HOSPITAL MEDICATIONS:  MEDICATIONS  (STANDING):    MEDICATIONS  (PRN):      LABS:                        8.2    8.67  )-----------( 267      ( 24 Oct 2022 21:30 )             30.0         156<H>  |  112<H>  |  34<H>  ----------------------------<  63<L>  3.8   |  35<H>  |  0.98    Ca    8.9      24 Oct 2022 21:30  Mg     1.80     10-24    TPro  5.9<L>  /  Alb  2.5<L>  /  TBili  0.5  /  DBili  x   /  AST  13  /  ALT  7   /  AlkPhos  93  10-24    PT/INR - ( 24 Oct 2022 23:05 )   PT: 24.2 sec;   INR: 2.07 ratio         PTT - ( 24 Oct 2022 23:05 )  PTT:34.2 sec      Venous Blood Gas:  10-24 @ 21:30  7.39/65/35/39/49.6  VBG Lactate: 1.6      MICROBIOLOGY:     RADIOLOGY:  [ ] Reviewed and interpreted by me    EKG:

## 2022-10-25 NOTE — ED ADULT NURSE REASSESSMENT NOTE - NS ED NURSE REASSESS COMMENT FT1
NP at bedside, advised to trial patient off BiPap. Pt placed on 5lpm via NC. Tolerating well. Will continue to monitor.

## 2022-10-25 NOTE — DISCHARGE NOTE PROVIDER - NSDCADMDATE_GEN_ALL_CORE_FT
Final Anesthesia Post-op Assessment    Patient: Efrain Rider  Procedure(s) Performed: LEFT QUADRICEPS TENDON REPAIR - LEFT  Anesthesia type: General    Vitals Value Taken Time   Temp 97.7 01/14/22 1420   Pulse 94 01/14/22 1418   Resp 12 01/14/22 1420   SpO2 93 % 01/14/22 1418   /88 01/14/22 1415   Vitals shown include unvalidated device data.      Patient Location: PACU Phase 1  Post-op Vital Signs:stable  Level of Consciousness: participates in exam, awake and alert  Respiratory Status: spontaneous ventilation  Cardiovascular blood pressure returned to baseline  Hydration: euvolemic  Pain Management: well controlled  Vomiting: none  Nausea: None  Airway Patency:patent  Post-op Assessment: awake, alert, appropriately conversant, or baseline, no complications, patient tolerated procedure well with no complications, no evidence of recall, dentition within defined limits, moving all extremities and No Corneal Abrasion      No complications documented.    25-Oct-2022 00:19

## 2022-10-25 NOTE — H&P ADULT - NSHPLABSRESULTS_GEN_ALL_CORE
LABS:                         8.2    8.67  )-----------( 267      ( 24 Oct 2022 21:30 )             30.0     10-24    156<H>  |  112<H>  |  34<H>  ----------------------------<  63<L>  3.8   |  35<H>  |  0.98    Ca    8.9      24 Oct 2022 21:30  Mg     1.80     10-24    TPro  5.9<L>  /  Alb  2.5<L>  /  TBili  0.5  /  DBili  x   /  AST  13  /  ALT  7   /  AlkPhos  93  10-24    PT/INR - ( 24 Oct 2022 23:05 )   PT: 24.2 sec;   INR: 2.07 ratio         PTT - ( 24 Oct 2022 23:05 )  PTT:34.2 sec        Serum Pro-Brain Natriuretic Peptide: 4737 pg/mL (10-24 @ 21:30)        RADIOLOGY, EKG & ADDITIONAL TESTS: Reviewed.

## 2022-10-25 NOTE — H&P ADULT - NSHPPHYSICALEXAM_GEN_ALL_CORE
PHYSICAL EXAM:  GENERAL: NAD, well-developed  HEAD:  Atraumatic, Normocephalic  EYES: EOMI, PERRLA, conjunctiva and sclera clear  NECK: Supple, No JVD  CHEST/LUNG: Diminished breath sounds left lung field, crackles right lung field.  HEART: Regular rate and rhythm; No murmurs, rubs, or gallops  ABDOMEN: Soft, Nontender, Nondistended; Bowel sounds present  EXTREMITIES:  2+ Peripheral Pulses, No clubbing, cyanosis, or edema  PSYCH: AAOx1 to person.  NEUROLOGY: 5/5 RUE and LLE strength, 0/5 LUE strength, sensation grossly intact  SKIN: sacral ulcer, right BKA

## 2022-10-25 NOTE — H&P ADULT - PROBLEM SELECTOR PLAN 3
-hold coumadin for now, pt likely would benefit from thoracentesis  -once off bipap metoprolol BID, can give metoprolol 5 mg iv if HR >110 (though currently paced) -hold coumadin for now, pt likely would benefit from thoracentesis  -once off bipap metoprolol BID, can give metoprolol 5 mg iv if HR >110 (though currently paced)    #Anemia  -Hg 8.2   -no melena or hematochezia  -check iron panel in am

## 2022-10-25 NOTE — H&P ADULT - PROBLEM SELECTOR PLAN 4
yes -large pleural effusion left sided, has had effusions prior w/ thoracentesis 2/2 CHF  -pulm eval  -check procal, if elevated start vanc/zosyn

## 2022-10-25 NOTE — CHART NOTE - NSCHARTNOTEFT_GEN_A_CORE
Spoke to radiology regarding CT chest results showing abrupt cutoff of the left mainstem bronchus with total left lung collapse. Large left and moderate right pleural effusions and emphysema.  Spoke with Dr. Felton and he recommends a CTsx consult. Will follow up recs.     DWAYNE Starr  Department of Medicine   In House # [id number] Spoke to radiology regarding CT chest results showing abrupt cutoff of the left mainstem bronchus with total left lung collapse. Large left and moderate right pleural effusions and emphysema.  Spoke with Dr. Felton and he recommends a CTsx consult. CT surgery consulted. Will follow up recs.     DWAYNE Starr  Department of Medicine   In House # [id number]

## 2022-10-25 NOTE — DISCHARGE NOTE PROVIDER - NSDCFUADDAPPT_GEN_ALL_CORE_FT
Podiatry Discharge Instructions:  Follow up: Please follow up with Dr. Joseph Waterhouse within 1 week of discharge from the hospital, please call [phone] for appointment and discuss that you recently were seen in the hospital.  Wound Care: Please apply santyl to left foot wounds followed by sterile 4x4 gauze abd pad and kerlex, 3x per week  Weight bearing: Please off-load left leg and foot with blue ZLFOW boot at all times   Podiatry Discharge Instructions:  Follow up: Please follow up with Dr. Joseph Waterhouse within 1 week of discharge from the hospital, if within the goals of care  please call [phone] for appointment and discuss that you recently were seen in the hospital.  Wound Care: Please apply santyl to left foot wounds followed by sterile 4x4 gauze abd pad and kerlex, 3x per week  Weight bearing: Please off-load left leg and foot with blue ZLFOW boot at all times

## 2022-10-25 NOTE — H&P ADULT - PROBLEM SELECTOR PLAN 1
-2/2 large pleural effusion and pulm vasc congestion  -troponin elevation likely 2/2 CHF exacerbation, add on ckmb and ck to r/o acute ACS  -check TTE  -tele monitoring  -lasix 40 mg iv BID, has chronic hudson for strict ins and outs.  -bipap 14/7 w/ fio2 60%  -recheck vbg as CO2 was elevated (likely chronic as pH wnl)  -pulm consult in am

## 2022-10-25 NOTE — ADVANCED PRACTICE NURSE CONSULT - ASSESSMENT
General: A&Ox2-3, bedbound. LUE and LLE weakness, able to turn to the side with 2 assist. Patient with nasal cannula (has been wearing since rehab as per daughter). Stable yellow scab to right posterior ear. Adair catheter in place, continent of stool as per Yael. Skin warm, dry with increased moisture in intertriginous folds (bilateral groin at risk for moisture associated dermatitis), sacrum to bilateral buttocks with blanchable erythema, scattered areas of hyperpigmentation and hypopigmentation, scattered areas of ecchymosis (without hematoma) on bilateral upper extremities. +3 edema to LUE. Generalized patches of dry flaky skin to BL UE and BL LE.     Left posterior ear - Stage I MDRPI, area of nonblanchable erythema measuring 0.5cmx0.5cm. No drainage, no odor, periwound with no erythema, no increased warmth, no edema, no induration, no signs or symptoms of overt skin infection. Goals of care: offload pressure, protect periwound skin. Area has improved as per daughter at bedside.     Cervical spine - atypical wound of unknown etiology measuring 2.6ivh3tmy6.2cm presenting with 60% friable pink base, 30% white fibrinous(?) film in center. Wound edge well demarcated with ring of hyperpigmentation circumferentially, moderate serosanguinous drainage, no odor. Periwound with blanchable erythema extending 2cm circumferentially, no increased warmth, no edema, no induration. Goals of care: atraumatic dressing, contain moderate drainage. Daughter reports patient has had this wound "for a long time".     Left upper posterior arm - skin tear vs deroofed blister measuring 4zrp3rty1.2cm, wound base with 100% pink moist dermis with small serous drainage, no odor. Daughter reports large amounts of serous drainage previously. Periwound with no increased warmth, no edema, no induration, no signs or symptoms of overt skin infection. Goals of care: contain small drainage.      Left buttock - Stage I PI as evidenced by nonblanching erythema measuring 1cmx0.5dej0tj. No drainage, no odor. Periwound with blanchable erythema, no increased warmth, no edema, no induration, no signs or symptoms of overt skin infection. Goals of care: offload pressure, monitor for tissue type changes.      Vascular: R BKA incision site healed, well approximated. Left lower extremity with hemosiderin staining, shiny taut skin, thickened-yellow hyperpigmented toenails. Left knee with area of blanchable erythema and pinpoint stable scab. Increased coolness from knee to distal toes. + 3 pitting edema. Unable to obtain pulses due to patient complaint of pain upon palpation. Capillary refill >3 seconds. Patient denies numbness and tingling of L toes. L plantar foot with dry flaky skin.     Left foot with multiple neuropathic ulcers with punched out appearance and 100% dry fibrinous base, no drainage, no odor. Periwound with blanchable erythema circumferentially, no increased warmth, no edema, no induration, no signs or symptoms of overt skin infection.     Left heel - 0.8cmx0.8cmx0.3cm  Left lateral foot 0.8cmx0.8cmx0.3cm    Goals of care: antimicrobial support, offload pressure, protect from friction and shear.  General: A&Ox2-3, bedbound. LUE and LLE weakness, able to turn to the side with 2 assist. Patient with nasal cannula (has been wearing since rehab as per daughter). Stable yellow scab to right posterior ear. Adair catheter in place, continent of stool as per Yael. Skin warm, dry with increased moisture in intertriginous folds (bilateral groin at risk for moisture associated dermatitis), sacrum to bilateral buttocks with blanchable erythema, scattered areas of hyperpigmentation and hypopigmentation, scattered areas of ecchymosis (without hematoma) on bilateral upper extremities. +3 edema to LUE. Generalized patches of dry flaky skin to BL UE and BL LE.     Left posterior ear - Stage I MDRPI, area of nonblanchable erythema measuring 0.5cmx0.5cm. No drainage, no odor, periwound with no erythema, no increased warmth, no edema, no induration, no signs or symptoms of overt skin infection. Goals of care: offload pressure, protect periwound skin. Area has improved as per daughter at bedside.     Cervical spine - atypical wound of unknown etiology measuring 2.5tpb3qhx9.2cm presenting with 60% friable pink base, 40% white-yellow fibrinous(?) film in center. Wound edge well demarcated with hyperpigmentation circumferentially, moderate serosanguinous drainage, no odor. Periwound with blanchable erythema extending 2cm circumferentially, no increased warmth, no edema, no induration. Goals of care: atraumatic dressing, contain moderate drainage. Daughter reports patient has had this wound "for a long time".     Left upper posterior arm - skin tear vs deroofed blister measuring 0xet4izd6.2cm with irregular border, wound base with 100% pink moist dermis with small serous drainage, no odor. Daughter reports large amounts of serous drainage previously. Periwound with no increased warmth, no edema, no induration, no signs or symptoms of overt skin infection. Goals of care: atraumatic dressing, contain small drainage.      Left buttock - Stage I PI as evidenced by nonblanching erythema measuring 1cmx0.7euy0fa. No drainage, no odor. Periwound with blanchable erythema, no increased warmth, no edema, no induration, no signs or symptoms of overt skin infection. Goals of care: offload pressure, protect from friction and shear, monitor for tissue type changes.      Vascular: R BKA incision site healed, well approximated. Left lower extremity with hemosiderin staining, shiny taut skin, thickened-yellow hyperpigmented toenails. Left knee with area of blanchable erythema and pinpoint stable scab. Increased coolness from knee to distal toes. + 3 pitting edema. Unable to obtain pulses due to patient complaint of pain upon palpation. Capillary refill >3 seconds. Patient denies numbness and tingling of L toes. L plantar foot with dry flaky skin.     Left foot with multiple neuropathic ulcers with punched out appearance and 100% dry fibrinous base, no drainage, no odor. Periwound with blanchable erythema circumferentially, no increased warmth, no edema, no induration, no signs or symptoms of overt skin infection.     Left heel - 0.8cmx0.8cmx0.3cm  Left lateral foot 0.8cmx0.8cmx0.3cm    Goals of care: antimicrobial support, offload pressure, protect from friction and shear.  General: A&Ox2-3, bedbound. LUE and LLE weakness, able to turn to the side with 2 assist. Patient with nasal cannula (has been wearing since rehab as per daughter). Stable yellow scab to right posterior ear. Adair catheter in place, continent of stool as per Yael. Skin warm, dry with increased moisture in intertriginous folds (bilateral groin at risk for moisture associated dermatitis), sacrum to bilateral buttocks with blanchable erythema, scattered areas of hyperpigmentation and hypopigmentation, scattered areas of ecchymosis (without hematoma) on bilateral upper extremities. +3 edema to LUE. Generalized patches of dry flaky skin to BL UE and BL LE.     Left posterior ear - Stage I MDRPI, area of nonblanchable erythema measuring 0.5cmx0.2mje8dr. No drainage, no odor, periwound with no erythema, no increased warmth, no edema, no induration, no signs or symptoms of overt skin infection. Goals of care: offload pressure, protect periwound skin. Area has improved as per daughter at bedside.     Cervical spine - atypical wound of unknown etiology measuring 2.9euy3jzr6.2cm presenting with 60% friable pink base, 40% white-yellow fibrinous(?) film in center. Wound edge well demarcated with hyperpigmentation circumferentially, moderate serosanguinous drainage, no odor. Periwound with blanchable erythema extending 2cm circumferentially, no increased warmth, no edema, no induration. Goals of care: atraumatic dressing, contain moderate drainage. Daughter reports patient has had this wound "for a long time".     Left upper posterior arm - skin tear vs deroofed blister measuring 3pwl2twg6.2cm with irregular border, wound base with 100% pink moist dermis with small serous drainage, no odor. Daughter reports large amounts of serous drainage previously. Periwound with no increased warmth, no edema, no induration, no signs or symptoms of overt skin infection. Goals of care: atraumatic dressing, contain small drainage.      Left buttock - Stage I PI as evidenced by nonblanching erythema measuring 1cmx0.9wof5rc. No drainage, no odor. Periwound with blanchable erythema, no increased warmth, no edema, no induration, no signs or symptoms of overt skin infection. Goals of care: offload pressure, protect from friction and shear, monitor for tissue type changes.      Vascular: R BKA incision site healed, well approximated. Left lower extremity with hemosiderin staining, shiny taut skin, thickened-yellow hyperpigmented toenails. Left knee with area of blanchable erythema and pinpoint stable scab. Increased coolness from knee to distal toes. + 3 pitting edema. Unable to obtain pulses due to patient complaint of pain upon palpation. Capillary refill >3 seconds. Patient denies numbness and tingling of L toes. L plantar foot with dry flaky skin.     Left foot with multiple neuropathic ulcers with punched out appearance and 100% dry fibrinous base, no drainage, no odor. Periwound with blanchable erythema circumferentially, no increased warmth, no edema, no induration, no signs or symptoms of overt skin infection.     Left heel - 0.8cmx0.8cmx0.3cm  Left lateral foot 0.8cmx0.8cmx0.3cm    Goals of care: antimicrobial support, offload pressure, protect from friction and shear.  General: A&Ox2-3, bedbound. LUE and LLE weakness, able to turn to the side with 2 assist. Patient with nasal cannula (has been wearing since rehab as per daughter). Stable yellow scab to right posterior ear. Adair catheter in place, continent of stool as per Yael. Skin warm, dry with increased moisture in intertriginous folds (bilateral groin at risk for moisture associated dermatitis), sacrum to bilateral buttocks with blanchable erythema, scattered areas of hyperpigmentation and hypopigmentation, scattered areas of ecchymosis (without hematoma) on bilateral upper extremities. +3 edema to LUE. Generalized patches of dry flaky skin to BL UE and BL LE.     Left posterior ear - Stage I MDRPI, area of nonblanchable erythema measuring 0.5cmx0.7uwt0or. No drainage, no odor, periwound with no erythema, no increased warmth, no edema, no induration, no signs or symptoms of overt skin infection. Goals of care: offload pressure, protect periwound skin. Area has improved as per daughter at bedside.     Cervical spine - atypical wound of unknown etiology measuring 2.5nym5gfp5.2cm presenting with 60% friable pink base, 40% white-yellow fibrinous(?) film in center. Wound edge well demarcated with hyperpigmentation circumferentially, moderate serosanguinous drainage, no odor. Periwound with blanchable erythema extending 2cm circumferentially, no increased warmth, no edema, no induration. Goals of care: atraumatic dressing, contain moderate drainage. Daughter reports patient has had this wound "for a long time".     Left upper posterior arm - skin tear vs deroofed blister measuring 1ijs7npn1.2cm with irregular border, wound base with 100% pink moist dermis with small serous drainage, no odor. Daughter reports large amounts of serous drainage previously. Periwound with no increased warmth, no edema, no induration, no signs or symptoms of overt skin infection. Goals of care: atraumatic dressing, contain small drainage.      Left buttock - Stage I PI as evidenced by nonblanching erythema measuring 1cmx0.7pci3mg. No drainage, no odor. Periwound with blanchable erythema, no increased warmth, no edema, no induration, no signs or symptoms of overt skin infection. Goals of care: offload pressure, protect from friction and shear, monitor for tissue type changes.      Vascular: R AKA incision site healed, well approximated. Left lower extremity with hemosiderin staining, shiny taut skin, thickened-yellow hyperpigmented toenails. Left knee with area of blanchable erythema and pinpoint stable scab. Increased coolness from knee to distal toes. + 3 pitting edema. Unable to obtain pulses due to patient complaint of pain upon palpation. Capillary refill >3 seconds. Patient denies numbness and tingling of L toes. L plantar foot with dry flaky skin.     Left foot with multiple neuropathic ulcers with punched out appearance and 100% dry fibrinous base, no drainage, no odor. Periwound with blanchable erythema circumferentially, no increased warmth, no edema, no induration, no signs or symptoms of overt skin infection.     Left heel - 0.8cmx0.8cmx0.3cm  Left lateral foot 0.8cmx0.8cmx0.3cm    Goals of care: antimicrobial support, offload pressure, protect from friction and shear.

## 2022-10-25 NOTE — CONSULT NOTE ADULT - SUBJECTIVE AND OBJECTIVE BOX
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 He was having increased work of breathing at the center. When he came to the ED he was a/o times 3. He was placed on bipap for work of breathing. MICU and cardiology was consulted, given troponin elevation of 280 (is around his baseline from last admission). Collateral obtained per daughters given patient is confused, states no bleeding or melena, and that he had a drop in his hemoglobin around this time. His vbg shows CO2 of 65, w/ compensation of pH. Per daughters, no episodes of fevers, diarrhea, sob, headaches, or falls.     EKG: ventricular paced rhythm, no acute ST elevations.  Chest xray w/ large left pleural effusion and pulm vascular congestion   (25 Oct 2022 01:47)  he was recently adm to hospital where he had bl thoracentesis  : rt side 1 l removed and left side 600 cc removed:  he was also treated for aspiration pneumonia and a had extensive mucus plugging and was not deemed to be good candidate for bronchoscopy :  now he presented again with sob : he is alert and awake and much less confused  daughter is at bedside:     ?FOLLOWING PRESENT  [x ] Hx of PE/DVT, [y ] Hx COPD, [x ] Hx of Asthma, [y ] Hx of Hospitalization, [ x]  Hx of BiPAP/CPAP use, [ y] Hx of ZACKARY    Allergies    No Known Allergies    Intolerances        PAST MEDICAL & SURGICAL HISTORY:  Diabetes Mellitus      Hypertension      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 in 17 yeras ago      Chronic Obstructive Pulmonary Disease (COPD)      Obstructive Sleep Apnea      Mycobacterium Avium-Intracellulare Infection  2009      Deep Vein Thrombosis (DVT)  17 yeras ago on Coumadin      CHF (congestive heart failure)  last exacerbation in 2017      Enlarged prostate      GERD (gastroesophageal reflux disease)      Hernia  umblical      Calculus of bile duct without cholangitis or cholecystitis without obstruction      Atrial fibrillation      COPD, severe      Chronic systolic congestive heart failure      H/O sick sinus syndrome      Cerebrovascular accident (CVA)      Hypothyroid      BPH (benign prostatic hyperplasia)      Chronic atrial fibrillation      S/P Hernia Repair      S/P cataract surgery, unspecified laterality      S/P ERCP  3/2017      S/P ERCP      H/O hernia repair          FAMILY HISTORY:  FH: HTN (hypertension) (Father)        Social History: [ ex sm ker ] TOBACCO                  [  x] ETOH                                 [ x ] IVDA/DRUGS    REVIEW OF SYSTEMS      General:	x    Skin/Breast:x  	  Ophthalmologic:x  	  ENMT:	x    Respiratory and Thorax:  sob, confusion   	  Cardiovascular:	x    Gastrointestinal:	x    Genitourinary:	x    Musculoskeletal:	  leg edema    Neurological:	x    Psychiatric:	x    Hematology/Lymphatics:	x    Endocrine:	x    Allergic/Immunologic:	x    MEDICATIONS  (STANDING):  aspirin enteric coated 81 milliGRAM(s) Oral daily  atorvastatin 80 milliGRAM(s) Oral at bedtime  BACItracin   Ointment 1 Application(s) Topical daily  finasteride 5 milliGRAM(s) Oral daily  furosemide   Injectable 40 milliGRAM(s) IV Push every 12 hours  levothyroxine Injectable 20 MICROGram(s) IV Push at bedtime  metoprolol tartrate 50 milliGRAM(s) Oral two times a day  montelukast 10 milliGRAM(s) Oral daily  pantoprazole  Injectable 40 milliGRAM(s) IV Push every 24 hours  polyethylene glycol 3350 17 Gram(s) Oral every 12 hours  tamsulosin 0.4 milliGRAM(s) Oral at bedtime    MEDICATIONS  (PRN):  albuterol/ipratropium for Nebulization 3 milliLiter(s) Nebulizer every 6 hours PRN Shortness of Breath and/or Wheezing       Vital Signs Last 24 Hrs  T(C): 36.6 (25 Oct 2022 12:31), Max: 36.8 (25 Oct 2022 08:30)  T(F): 97.8 (25 Oct 2022 12:31), Max: 98.3 (25 Oct 2022 08:30)  HR: 102 (25 Oct 2022 12:31) (70 - 109)  BP: 127/72 (25 Oct 2022 12:31) (124/55 - 145/77)  BP(mean): --  RR: 16 (25 Oct 2022 12:31) (16 - 23)  SpO2: 93% (25 Oct 2022 12:31) (93% - 100%)    Parameters below as of 25 Oct 2022 12:31  Patient On (Oxygen Delivery Method): nasal cannula  O2 Flow (L/min): 3  Orthostatic VS          I&O's Summary    24 Oct 2022 07:01  -  25 Oct 2022 07:00  --------------------------------------------------------  IN: 0 mL / OUT: 1400 mL / NET: -1400 mL        Physical Exam:   GENERAL: NAD, well-groomed, well-developed  HEENT: PETRONA/   Atraumatic, Normocephalic  ENMT: No tonsillar erythema, exudates, or enlargement; Moist mucous membranes, Good dentition, No lesions  NECK: Supple, No JVD, Normal thyroid  CHEST/LUNG: cracekls with decreased air entry : no wheezing:   CVS: Regular rate and rhythm; No murmurs, rubs, or gallops  GI: : Soft, Nontender, Nondistended; Bowel sounds present  NERVOUS SYSTEM:  Alert & Oriented X2- 3  EXTREMITIES:+ edema : rt AKA wmputatoin  LYMPH: No lymphadenopathy noted  SKIN: No rashes or lesions  ENDOCRINOLOGY: No Thyromegaly  PSYCH: Appropriate    Labs:  9.0<60<4>>40<<7.385>>9.0<<3><<4><<5<<409>>, Venous<65<4>>35<<7.395>>Venous<<3><<4><<5<<359>>SARS-CoV-2: NotDetec (24 Oct 2022 21:43)  COVID-19 PCR: NotDetec (21 Sep 2022 07:54)  SARS-CoV-2: NotDetec (14 Sep 2022 13:36)  COVID-19 PCR: NotDetec (02 May 2022 07:23)    CARDIAC MARKERS ( 24 Oct 2022 23:05 )  x     / x     / 74 U/L / x     / 8.2 ng/mL                            8.4    7.85  )-----------( 238      ( 25 Oct 2022 06:49 )             30.4                         8.2    8.67  )-----------( 267      ( 24 Oct 2022 21:30 )             30.0     10-25    153<H>  |  109<H>  |  35<H>  ----------------------------<  187<H>  5.3   |  27  |  0.96  10-24    156<H>  |  112<H>  |  34<H>  ----------------------------<  63<L>  3.8   |  35<H>  |  0.98    Ca    8.4      25 Oct 2022 06:49  Ca    8.9      24 Oct 2022 21:30  Phos  4.0     10-25  Mg     2.00     10-25  Mg     1.80     10-24    TPro  5.7<L>  /  Alb  2.5<L>  /  TBili  0.5  /  DBili  x   /  AST  31  /  ALT  8   /  AlkPhos  90  10-25  TPro  5.9<L>  /  Alb  2.5<L>  /  TBili  0.5  /  DBili  x   /  AST  13  /  ALT  7   /  AlkPhos  93  10-24    CAPILLARY BLOOD GLUCOSE      POCT Blood Glucose.: 119 mg/dL (25 Oct 2022 00:55)  POCT Blood Glucose.: 108 mg/dL (24 Oct 2022 19:47)    LIVER FUNCTIONS - ( 25 Oct 2022 06:49 )  Alb: 2.5 g/dL / Pro: 5.7 g/dL / ALK PHOS: 90 U/L / ALT: 8 U/L / AST: 31 U/L / GGT: x           PT/INR - ( 25 Oct 2022 06:49 )   PT: 24.0 sec;   INR: 2.05 ratio         PTT - ( 25 Oct 2022 06:49 )  PTT:34.5 sec  Urinalysis Basic - ( 25 Oct 2022 07:30 )    Color: Light Yellow / Appearance: Slightly Turbid / S.012 / pH: x  Gluc: x / Ketone: Trace  / Bili: Negative / Urobili: <2 mg/dL   Blood: x / Protein: Trace / Nitrite: Negative   Leuk Esterase: Large / RBC: 2 /HPF / WBC 99 /HPF   Sq Epi: x / Non Sq Epi: 1 /HPF / Bacteria: Many      D DImer  Procalcitonin, Serum: 0.26 ng/mL (10-24 @ 23:05)  D-Dimer Assay, Quantitative: 399 ng/mL DDU (10-25 @ 01:05)  Serum Pro-Brain Natriuretic Peptide: 4737 pg/mL (10-24 @ 21:30)      Studies  Chest X-RAY  CT SCAN Chest   CT Abdomen  Venous Dopplers: LE:   Others    rad< from: Xray Chest 1 View- PORTABLE-Urgent (Xray Chest 1 View- PORTABLE-Urgent .) (10.24.22 @ 23:51) >      INTERPRETATION:  CLINICAL INFORMATION: Lethargy, shortness of breath.    TECHNIQUE: AP radiograph of the chest.    COMPARISON: No comparison available.    FINDINGS:    Large left pleural effusion. No pneumothorax.  Heart size is poorly assessed on this projection. Dual lead pacemaker in   the left chest wall.  Osseous degenerative changes.    IMPRESSION:    Large left pleural effusion with underlying atelectasis.    Comment: CT chest might provide further information.      --- End of Report ---          JOBY HANSEN MD; Resident Radiologist  This document has been electronically signed.  JUNIOR MENG MD; Attending Radiologist  This document has been electronically signed. Oct 25 2022  6:37AM    < end of copied text >  < from: Xray Chest 1 View- PORTABLE-Urgent (Xray Chest 1 View- PORTABLE-Urgent .) (10.24.22 @ 23:51) >      INTERPRETATION:  CLINICAL INFORMATION: Lethargy, shortness of breath.    TECHNIQUE: AP radiograph of the chest.    COMPARISON: No comparison available.    FINDINGS:    Large left pleural effusion. No pneumothorax.  Heart size is poorly assessed on this projection. Dual lead pacemaker in   the left chest wall.  Osseous degenerative changes.    IMPRESSION:    Large left pleural effusion with underlying atelectasis.    Comment: CT chest might provide further information.      --- End of Report ---      ct    JOBY HANSEN MD; Resident Radiologist  This document has been electronically signed.  JUNIOR MENG MD; Attending Radiologist  This document has been electronically signed. Oct 25 2022  6:37AM    < end of copied text >  < from: CT Chest No Cont (22 @ 16:47) >    Upper Abdomen: Unremarkable.    Osseous structures and Soft Tissues: There is qualitative osteopenia. Old   rib fractures are unchanged.    IMPRESSION:    1.  Decreased moderate left and small right pleural effusions    2.  New mucous plugging in both lower lobes with retained secretions in   the trachea, question aspiration. Extensive small airways impaction in   the right middle and right lower lobes, some of which is calcified, may   reflect prior aspiration event.    3.  Stable tubular nodularity in the right upper lobe and right middle   lobe. Reassessment on follow-up chest CT scan is recommended in 4-6 weeks    4.  Emphysema    --- End of Report ---    < end of copied text >

## 2022-10-25 NOTE — ADVANCED PRACTICE NURSE CONSULT - REASON FOR CONSULT
Patient seen on skin care rounds after wound care referral received for assessment of skin impairment and recommendations of topical management of sacral wound and right AKA. 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 As per patient's daughter Yael at bedside, patient follows up with a vascular surgeon, states that patient has had chronic hudson since April. Chart reviewed: WBC 7.85 , H/H 8.4/30.4, INR 2.05 , Platelets 238, hA1c 9.1, BMI 31.3. Pending LUE duplex to r/o DVT.

## 2022-10-25 NOTE — CONSULT NOTE ADULT - ASSESSMENT
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 Low suspicion for COPD exacerbation at this time. He has had thoracenteses in the past that are unrevealing. CT chest from last admission with centrilobular emphysema but otherwise, unremarkable for obstructive process such as malignancy.     # Acute on chronic hypoxemic respiratory failure  # Bilateral pleural effusions (L>R) due to ischemic cardiomyopathy   # Acute on chronic systolic CHF   # Chronic hypercapnic respiratory failure due to COPD/emphysema   # Elevated troponin     Recommendations  - Recommend diuresis IV lasix 40mg   - Continue BIPAP as tolerated   - Keep NPO  - Place patient on his right side to improve VQ mismatch   - Continue with duobnebs and home inhalers   - Cardiology and pulm follow up     Not a MICU candidate at this time.     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 Low suspicion for COPD exacerbation at this time. He has had thoracenteses in the past that are unrevealing. TTE from 09/2022 with severe segmental left ventricular systolic dysfunction (mid to distal anteroseptum is severely hypokinetic. the mid to basal inferolateral wall is hypokinetic). On 20mg lasix daily per nursing home notes.  CT chest from last admission with centrilobular emphysema but otherwise, unremarkable for obstructive process such as malignancy.     # Acute on chronic hypoxemic respiratory failure  # Bilateral pleural effusions (L>R) due to ischemic cardiomyopathy   # Acute on chronic systolic CHF   # Chronic hypercapnic respiratory failure due to COPD/emphysema   # Elevated troponin     Recommendations  - Recommend diuresis IV lasix 40mg   - Continue BIPAP as tolerated   - Keep NPO  - Place patient on his right side to improve VQ mismatch   - Continue with duobnebs and home inhalers   - Cardiology and pulm follow up     Not a MICU candidate at this time.

## 2022-10-25 NOTE — CONSULT NOTE ADULT - SUBJECTIVE AND OBJECTIVE BOX
Podiatry pager #: [phone]/ [phone]    Patient is a 87y old  Male who presents with a chief complaint of Acute hypoxic respiratory failure (25 Oct 2022 14:28)      HPI:  Patient is an 87 year old M hx of severe systolic CHF, b/l pleural effusion s/p thoracentesis in the past, SSS w/ pacemaker w/ f/u w/ EP, CVA w/ residual left sided UE weakness, chronic hudson, BPH, COPD/asthma, restrictive lung dx 2/2 obesity, right AKA, hypothyroid, afib on coumadin who presents from [facility] w/ lethargy and sob. He was having increased work of breathing at the center. When he came to the ED he was a/o times 3. He was placed on bipap for work of breathing. MICU and cardiology was consulted, given troponin elevation of 280 (is around his baseline from last admission). Collateral obtained per daughters given patient is confused, states no bleeding or melena, and that he had a drop in his hemoglobin around this time. His vbg shows CO2 of 65, w/ compensation of pH. Per daughters, no episodes of fevers, diarrhea, sob, headaches, or falls.     Podiatry consulted for left foot wounds, per daughter wounds have been similar and size and appearance for 3 months. Pt denies pain from wounds, denies any other pedal complaints.      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      Deep Vein Thrombosis (DVT)  17 yeras ago on Coumadin      CHF (congestive heart failure)  last exacerbation in 1/2017      Enlarged prostate      GERD (gastroesophageal reflux disease)      Hernia  umblical      Calculus of bile duct without cholangitis or cholecystitis without obstruction      Atrial fibrillation      COPD, severe      Chronic systolic congestive heart failure      H/O sick sinus syndrome      Cerebrovascular accident (CVA)      Hypothyroid      BPH (benign prostatic hyperplasia)      Chronic atrial fibrillation      S/P Hernia Repair      S/P cataract surgery, unspecified laterality      S/P ERCP  3/2017      S/P ERCP      H/O hernia repair          MEDICATIONS  (STANDING):  aspirin enteric coated 81 milliGRAM(s) Oral daily  atorvastatin 80 milliGRAM(s) Oral at bedtime  BACItracin   Ointment 1 Application(s) Topical daily  buDESOnide    Inhalation Suspension 0.5 milliGRAM(s) Inhalation every 12 hours  finasteride 5 milliGRAM(s) Oral daily  furosemide   Injectable 40 milliGRAM(s) IV Push every 12 hours  levothyroxine Injectable 20 MICROGram(s) IV Push at bedtime  metoprolol tartrate 50 milliGRAM(s) Oral two times a day  montelukast 10 milliGRAM(s) Oral daily  pantoprazole  Injectable 40 milliGRAM(s) IV Push every 24 hours  polyethylene glycol 3350 17 Gram(s) Oral every 12 hours  tamsulosin 0.4 milliGRAM(s) Oral at bedtime    MEDICATIONS  (PRN):  albuterol/ipratropium for Nebulization 3 milliLiter(s) Nebulizer every 6 hours PRN Shortness of Breath and/or Wheezing      Allergies    No Known Allergies    Intolerances        VITALS:    Vital Signs Last 24 Hrs  T(C): 36.6 (25 Oct 2022 12:31), Max: 36.8 (25 Oct 2022 08:30)  T(F): 97.8 (25 Oct 2022 12:31), Max: 98.3 (25 Oct 2022 08:30)  HR: 102 (25 Oct 2022 12:31) (70 - 109)  BP: 127/72 (25 Oct 2022 12:31) (124/55 - 145/77)  BP(mean): --  RR: 16 (25 Oct 2022 12:31) (16 - 23)  SpO2: 93% (25 Oct 2022 12:31) (93% - 100%)    Parameters below as of 25 Oct 2022 12:31  Patient On (Oxygen Delivery Method): nasal cannula  O2 Flow (L/min): 3      LABS:                          8.4    7.85  )-----------( 238      ( 25 Oct 2022 06:49 )             30.4       10-25    153<H>  |  109<H>  |  35<H>  ----------------------------<  187<H>  5.3   |  27  |  0.96    Ca    8.4      25 Oct 2022 06:49  Phos  4.0     10-25  Mg     2.00     10-25    TPro  5.7<L>  /  Alb  2.5<L>  /  TBili  0.5  /  DBili  x   /  AST  31  /  ALT  8   /  AlkPhos  90  10-25      CAPILLARY BLOOD GLUCOSE      POCT Blood Glucose.: 119 mg/dL (25 Oct 2022 00:55)  POCT Blood Glucose.: 108 mg/dL (24 Oct 2022 19:47)      PT/INR - ( 25 Oct 2022 06:49 )   PT: 24.0 sec;   INR: 2.05 ratio         PTT - ( 25 Oct 2022 06:49 )  PTT:34.5 sec    LOWER EXTREMITY PHYSICAL EXAM:    Vasular: DP/PT _/4, L, CFT < 3 seconds L, Temperature gradient warm to cool, L.   Neuro: Epicritic sensation diminished to the level of the toes, L.  Musculoskeletal/Ortho: R AKA  Skin: punctate posterior heel wound to level of subq with fibrotic base, no undermining or tracking, no purulence or malodor, lateral 5th MPJ punctate wound to level of capsule with fibrotic base, mild periwound erythema, no drainage or clinical signs of infection. Right AKA.      RADIOLOGY & ADDITIONAL STUDIES:

## 2022-10-25 NOTE — CONSULT NOTE ADULT - ATTENDING COMMENTS
pt is an 86 yo male with hx HTN, DM< copd, CKD<ZACKARY,PPM  CVA who presents from nursing home with episode of   lethargy, Pt noted to have hypercapnia with hx left pleural  effusion. Pt placed on noninvasive ventilation for acute on  chronic hypoxemic respiratory failure from decompensatd  heart failure with copd.   PE arousable bp 140/68 rr 22 heent pupils reactive  lungs dec bs on the left, heart s1s2 abdomen  obese nontender  umbilical hernia, ext edema       wbc 8 hgb 8 hct 30            cxr large left effusion  bicarb 35 cr 0.98  EKG pacemaker rhythm    A/P   86 yo male with hx heart failure, left  pleural effusion, with hypoxemia and hypercapnia  -continue noninvasive ventilation bipap 10/5   -monitor pulse ox  -diuresis with furosemide as tolerated  -echo to evaluate lv function  -cardiology evaluation,   -check troponin, ekg , q 8 hrs  -dvt prophylaxis  -vbg on bipap to trend paco2

## 2022-10-25 NOTE — DISCHARGE NOTE PROVIDER - HOSPITAL COURSE
87 year old M hx of severe systolic CHF, b/l pleural effusion s/p thoracentesis in the past, SSS w/ pacemaker w/ f/u w/ EP, CVA w/ residual left sided UE weakness, chronic hudson, BPH, COPD/asthma, restrictive lung dx 2/2 obesity, right AKA, hypothyroid, afib on coumadin who presents from [facility] w/ lethargy and sob.     Acute respiratory failure with hypoxia.   ·  Plan: - 2/2 large pleural effusion and pulm vasc congestion  - troponin elevation likely 2/2 CHF exacerbation (stable x 3 sets). card following   - TTE: Severe global left ventricular systolic dysfunction. EF 28%  - tele monitoring: no events. paced rhythm.   - c/w Lasix 40 mg iv BID, has chronic Hudson for strict ins and outs.  - pulm consult: Dr. Felton.   - CT surgery on the case for thoracentesis (INR 2.5, procedure canceled)  - vit K PO x 1 for elevated INR in anticipation for thoracentesis, which was done 10/31/22: s/p 1500 cc fluid removed.   - no plans for further imaging or invasive procedures  - poor prognosis, comfort care only  - Advance directives discussed with the daughter Willow HCP requests palliative eval. Consulted.   - The daughter Willow asking for DNR/DNI. wishes that he will be going home with home hospice once he is a little better optimized. d/w palliative team Delmy.  - Low-dose IV MSO4 as need for relieving work of breathing is appropriate.    COPD, severe.   ·  Plan: - no wheezing  - c/w duonebs Q6 prn wheezing  - per ED no wheezing earlier either, but crackles  - diuretics as above.     Chronic atrial fibrillation.   ·  Plan: - on Lovenox SQ BID   - holding coumadin for now.     Anemia  -Hg 8.2   -no melena or hematochezia  -iron panel: ACD and iron low sats  -IV Iron sucrose x 1 (10/29/22) and (10/31/22).    Pleural effusion.   ·  Plan: -large pleural effusion left sided, has had effusions prior w/ thoracentesis 2/2 CHF  -pulm eval, s/p thoracentesis as above  -procal low at 0.26 on admission  -comfort care measures.    Hypothyroid.   ·  Plan: -c/w synthroid while pt is NPO can do 18.75 mcg IV  -TSH 1.66.    H/O: CVA (cerebrovascular accident).   ·  Plan: -c/w asp/lipitor.    BPH (benign prostatic hyperplasia).   ·  Plan: -has chronic hudson  -can c/w flomax and finasteride.    Nutrition, metabolism, and development symptoms.   ·  Plan: off bipap --> weaned to nasal canula.   seen by speech, pureed diet with mod thick liquid recommended    Hypernatremia  Na improving, s/p D5W per renal.   diuretic also on hold  renal f/u appreciated.  comfort care measure at this point, so no further labwork, .    On ___ this case was reviewed with  ____, the patient is medically stable and optimized for discharge. All medications were reviewed and prescriptions were sent to mutually agreed upon pharmacy.

## 2022-10-25 NOTE — PATIENT PROFILE ADULT - FALL HARM RISK - HARM RISK INTERVENTIONS
Assistance with ambulation/Assistance OOB with selected safe patient handling equipment/Communicate Risk of Fall with Harm to all staff/Discuss with provider need for PT consult/Monitor gait and stability/Reinforce activity limits and safety measures with patient and family/Tailored Fall Risk Interventions/Visual Cue: Yellow wristband and red socks/Bed in lowest position, wheels locked, appropriate side rails in place/Call bell, personal items and telephone in reach/Instruct patient to call for assistance before getting out of bed or chair/Non-slip footwear when patient is out of bed/[place] to call system/Physically safe environment - no spills, clutter or unnecessary equipment/Purposeful Proactive Rounding/Room/bathroom lighting operational, light cord in reach

## 2022-10-25 NOTE — CONSULT NOTE ADULT - ASSESSMENT
Patient is an 87 year old M hx of severe systolic CHF, b/l pleural effusion s/p thoracentesis in the past, SSS w/ pacemaker w/ f/u w/ EP, CVA w/ residual left sided UE weakness, chronic hudson, BPH, COPD/asthma, restrictive lung dx 2/2 obesity, right AKA, hypothyroid, afib on coumadin who presents from [facility] w/ lethargy and sob.

## 2022-10-25 NOTE — CONSULT NOTE ADULT - SUBJECTIVE AND OBJECTIVE BOX
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 He was having increased work of breathing at the center. When he came to the ED he was a/o times 3. He was placed on bipap for work of breathing. MICU and cardiology was consulted, given troponin elevation of 280 (is around his baseline from last admission). Collateral obtained per daughters given patient is confused, states no bleeding or melena, and that he had a drop in his hemoglobin around this time. His vbg shows CO2 of 65, w/ compensation of pH. Per daughters, no episodes of fevers, diarrhea, sob, headaches, or falls.     EKG: ventricular paced rhythm, no acute ST elevations.  Chest xray w/ large left pleural effusion and pulm vascular congestion   (25 Oct 2022 01:47)  CT chest showing large  left effusion and moderate right effusion, abrupt cutoff of the left mainstem bronchus with total left lung collapse.  Patient s/p left thoracentesis x2 last month (9/15 and ), no path report seen, but from note fluid appears transudative.  Patient on coumadin, last dose possibly on 10/24, INR 2.05.      PAST MEDICAL & SURGICAL HISTORY:  Diabetes Mellitus      Hypertension      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 in 17 yeras ago      Chronic Obstructive Pulmonary Disease (COPD)      Obstructive Sleep Apnea      Mycobacterium Avium-Intracellulare Infection  2009      Deep Vein Thrombosis (DVT)  17 yeras ago on Coumadin      CHF (congestive heart failure)  last exacerbation in 2017      Enlarged prostate      GERD (gastroesophageal reflux disease)      Hernia  umblical      Calculus of bile duct without cholangitis or cholecystitis without obstruction      Atrial fibrillation      COPD, severe      Chronic systolic congestive heart failure      H/O sick sinus syndrome      Cerebrovascular accident (CVA)      Hypothyroid      BPH (benign prostatic hyperplasia)      Chronic atrial fibrillation      S/P Hernia Repair      S/P cataract surgery, unspecified laterality      S/P ERCP  3/2017      S/P ERCP      H/O hernia repair          REVIEW OF SYSTEMS    Patient poor historian   no bleeding or melena, and that he had a drop in his hemoglobin around this time.  Per daughters, no episodes of fevers, diarrhea, sob, headaches, or falls.   lethargy and sob. He was having increased work of breathing    MEDICATIONS  (STANDING):  aspirin enteric coated 81 milliGRAM(s) Oral daily  atorvastatin 80 milliGRAM(s) Oral at bedtime  BACItracin   Ointment 1 Application(s) Topical daily  buDESOnide    Inhalation Suspension 0.5 milliGRAM(s) Inhalation every 12 hours  collagenase Ointment 1 Application(s) Topical daily  finasteride 5 milliGRAM(s) Oral daily  furosemide   Injectable 40 milliGRAM(s) IV Push every 12 hours  levothyroxine Injectable 20 MICROGram(s) IV Push at bedtime  metoprolol tartrate 50 milliGRAM(s) Oral two times a day  montelukast 10 milliGRAM(s) Oral daily  pantoprazole  Injectable 40 milliGRAM(s) IV Push every 24 hours  polyethylene glycol 3350 17 Gram(s) Oral every 12 hours  tamsulosin 0.4 milliGRAM(s) Oral at bedtime    MEDICATIONS  (PRN):  albuterol/ipratropium for Nebulization 3 milliLiter(s) Nebulizer every 6 hours PRN Shortness of Breath and/or Wheezing      Allergies    No Known Allergies    Intolerances        SOCIAL HISTORY:  From [facility]     FAMILY HISTORY:  FH: HTN (hypertension) (Father)        Vital Signs Last 24 Hrs  T(C): 36.8 (25 Oct 2022 21:25), Max: 36.8 (25 Oct 2022 08:30)  T(F): 98.2 (25 Oct 2022 21:25), Max: 98.3 (25 Oct 2022 08:30)  HR: 76 (25 Oct 2022 21:25) (70 - 109)  BP: 134/60 (25 Oct 2022 21:25) (124/55 - 134/60)  BP(mean): --  RR: 20 (25 Oct 2022 21:25) (16 - 23)  SpO2: 97% (25 Oct 2022 21:25) (93% - 100%)    Parameters below as of 25 Oct 2022 21:25  Patient On (Oxygen Delivery Method): nasal cannula  O2 Flow (L/min): 3      General: WN/WD NAD  Neurology: Awake,  Eyes: Scleras clear, PERRLA/ EOMI, Gross vision intact  ENT:Gross hearing intact, grossly patent pharynx, no stridor  Neck: Neck supple, trachea midline, No JVD,   CHEST/LUNG: Diminished breath sounds left lung field, crackles right lung field.  HEART: Regular rate and rhythm; No murmurs, rubs, or gallops  ABDOMEN: Soft, Nontender, Nondistended; Bowel sounds present  EXTREMITIES:  2+ Peripheral Pulses, +lower extremity edema   PSYCH: AAOx1 to person.  NEUROLOGY: 5/5 RUE and LLE strength, 0/5 LUE strength, sensation grossly intact  SKIN: sacral ulcer, right BKA  LABS:                        8.4    7.85  )-----------( 238      ( 25 Oct 2022 06:49 )             30.4     10-25    153<H>  |  109<H>  |  35<H>  ----------------------------<  187<H>  5.3   |  27  |  0.96    Ca    8.4      25 Oct 2022 06:49  Phos  4.0     10-25  Mg     2.00     10-25    TPro  5.7<L>  /  Alb  2.5<L>  /  TBili  0.5  /  DBili  x   /  AST  31  /  ALT  8   /  AlkPhos  90  10-25    PT/INR - ( 25 Oct 2022 06:49 )   PT: 24.0 sec;   INR: 2.05 ratio         PTT - ( 25 Oct 2022 06:49 )  PTT:34.5 sec  Urinalysis Basic - ( 25 Oct 2022 07:30 )    Color: Light Yellow / Appearance: Slightly Turbid / S.012 / pH: x  Gluc: x / Ketone: Trace  / Bili: Negative / Urobili: <2 mg/dL   Blood: x / Protein: Trace / Nitrite: Negative   Leuk Esterase: Large / RBC: 2 /HPF / WBC 99 /HPF   Sq Epi: x / Non Sq Epi: 1 /HPF / Bacteria: Many        RADIOLOGY & ADDITIONAL STUDIES:  CT Chest: Large left pleural effusion, moderate right effusion, abrupt cutoff of the left mainstem bronchus with total left lung collapse   ASSESSMENT:   87yMalePAST MEDICAL & SURGICAL HISTORY:  Diabetes Mellitus      Hypertension      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 in 17 yeras ago      Chronic Obstructive Pulmonary Disease (COPD)      Obstructive Sleep Apnea      Mycobacterium Avium-Intracellulare Infection  2009      Deep Vein Thrombosis (DVT)  17 yeras ago on Coumadin      CHF (congestive heart failure)  last exacerbation in 2017      Enlarged prostate      GERD (gastroesophageal reflux disease)      Hernia  umblical      Calculus of bile duct without cholangitis or cholecystitis without obstruction      Atrial fibrillation      COPD, severe      Chronic systolic congestive heart failure      H/O sick sinus syndrome      Cerebrovascular accident (CVA)      Hypothyroid      BPH (benign prostatic hyperplasia)      Chronic atrial fibrillation      S/P Hernia Repair      S/P cataract surgery, unspecified laterality      S/P ERCP  3/2017      S/P ERCP      H/O hernia repair      HEALTH ISSUES - PROBLEM Dx:  COPD, severe    Pleural effusion    Hypothyroid    Acute respiratory failure with hypoxia    BPH (benign prostatic hyperplasia)    H/O: CVA (cerebrovascular accident)    Nutrition, metabolism, and development symptoms    Chronic atrial fibrillation        HEALTH ISSUES - R/O PROBLEM Dx:  Bilateral pleural effusion     PLAN:  Add on for Flex Bronch, left pleurx cath placement   Continue to hold coumadin   Repeat Coags in Am   NPO after midnight   Above discussed with Dr. 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 He was having increased work of breathing at the center. When he came to the ED he was a/o times 3. He was placed on bipap for work of breathing. MICU and cardiology was consulted, given troponin elevation of 280 (is around his baseline from last admission). Collateral obtained per daughters given patient is confused, states no bleeding or melena, and that he had a drop in his hemoglobin around this time. His vbg shows CO2 of 65, w/ compensation of pH. Per daughters, no episodes of fevers, diarrhea, sob, headaches, or falls.     EKG: ventricular paced rhythm, no acute ST elevations.  Chest xray w/ large left pleural effusion and pulm vascular congestion   (25 Oct 2022 01:47)  CT chest showing large  left effusion and moderate right effusion, abrupt cutoff of the left mainstem bronchus with total left lung collapse.  Patient s/p left thoracentesis x2 last month (9/15 and ), no path report seen, but from note fluid appears transudative.  Patient on coumadin, last dose possibly on 10/24, INR 2.05.  Echo- EF 28%, severe global LV systolic dysfunction    PAST MEDICAL & SURGICAL HISTORY:  Diabetes Mellitus      Hypertension      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 in 17 yeras ago      Chronic Obstructive Pulmonary Disease (COPD)      Obstructive Sleep Apnea      Mycobacterium Avium-Intracellulare Infection  2009      Deep Vein Thrombosis (DVT)  17 yeras ago on Coumadin      CHF (congestive heart failure)  last exacerbation in 2017      Enlarged prostate      GERD (gastroesophageal reflux disease)      Hernia  umblical      Calculus of bile duct without cholangitis or cholecystitis without obstruction      Atrial fibrillation      COPD, severe      Chronic systolic congestive heart failure      H/O sick sinus syndrome      Cerebrovascular accident (CVA)      Hypothyroid      BPH (benign prostatic hyperplasia)      Chronic atrial fibrillation      S/P Hernia Repair      S/P cataract surgery, unspecified laterality      S/P ERCP  3/2017      S/P ERCP      H/O hernia repair          REVIEW OF SYSTEMS    Patient poor historian   no bleeding or melena, and that he had a drop in his hemoglobin around this time.  Per daughters, no episodes of fevers, diarrhea, sob, headaches, or falls.   lethargy and sob. He was having increased work of breathing    MEDICATIONS  (STANDING):  aspirin enteric coated 81 milliGRAM(s) Oral daily  atorvastatin 80 milliGRAM(s) Oral at bedtime  BACItracin   Ointment 1 Application(s) Topical daily  buDESOnide    Inhalation Suspension 0.5 milliGRAM(s) Inhalation every 12 hours  collagenase Ointment 1 Application(s) Topical daily  finasteride 5 milliGRAM(s) Oral daily  furosemide   Injectable 40 milliGRAM(s) IV Push every 12 hours  levothyroxine Injectable 20 MICROGram(s) IV Push at bedtime  metoprolol tartrate 50 milliGRAM(s) Oral two times a day  montelukast 10 milliGRAM(s) Oral daily  pantoprazole  Injectable 40 milliGRAM(s) IV Push every 24 hours  polyethylene glycol 3350 17 Gram(s) Oral every 12 hours  tamsulosin 0.4 milliGRAM(s) Oral at bedtime    MEDICATIONS  (PRN):  albuterol/ipratropium for Nebulization 3 milliLiter(s) Nebulizer every 6 hours PRN Shortness of Breath and/or Wheezing      Allergies    No Known Allergies    Intolerances        SOCIAL HISTORY:  From [facility]     FAMILY HISTORY:  FH: HTN (hypertension) (Father)        Vital Signs Last 24 Hrs  T(C): 36.8 (25 Oct 2022 21:25), Max: 36.8 (25 Oct 2022 08:30)  T(F): 98.2 (25 Oct 2022 21:25), Max: 98.3 (25 Oct 2022 08:30)  HR: 76 (25 Oct 2022 21:25) (70 - 109)  BP: 134/60 (25 Oct 2022 21:25) (124/55 - 134/60)  BP(mean): --  RR: 20 (25 Oct 2022 21:25) (16 - 23)  SpO2: 97% (25 Oct 2022 21:25) (93% - 100%)    Parameters below as of 25 Oct 2022 21:25  Patient On (Oxygen Delivery Method): nasal cannula  O2 Flow (L/min): 3      General: WN/WD NAD  Neurology: Awake,  Eyes: Scleras clear, PERRLA/ EOMI, Gross vision intact  ENT:Gross hearing intact, grossly patent pharynx, no stridor  Neck: Neck supple, trachea midline, No JVD,   CHEST/LUNG: Diminished breath sounds left lung field, crackles right lung field.  HEART: Regular rate and rhythm; No murmurs, rubs, or gallops  ABDOMEN: Soft, Nontender, Nondistended; Bowel sounds present  EXTREMITIES:  2+ Peripheral Pulses, +lower extremity edema   PSYCH: AAOx1 to person.  NEUROLOGY: 5/5 RUE and LLE strength, 0/5 LUE strength, sensation grossly intact  SKIN: sacral ulcer, right BKA  LABS:                        8.4    7.85  )-----------( 238      ( 25 Oct 2022 06:49 )             30.4     10-25    153<H>  |  109<H>  |  35<H>  ----------------------------<  187<H>  5.3   |  27  |  0.96    Ca    8.4      25 Oct 2022 06:49  Phos  4.0     10-25  Mg     2.00     10-25    TPro  5.7<L>  /  Alb  2.5<L>  /  TBili  0.5  /  DBili  x   /  AST  31  /  ALT  8   /  AlkPhos  90  10-25    PT/INR - ( 25 Oct 2022 06:49 )   PT: 24.0 sec;   INR: 2.05 ratio         PTT - ( 25 Oct 2022 06:49 )  PTT:34.5 sec  Urinalysis Basic - ( 25 Oct 2022 07:30 )    Color: Light Yellow / Appearance: Slightly Turbid / S.012 / pH: x  Gluc: x / Ketone: Trace  / Bili: Negative / Urobili: <2 mg/dL   Blood: x / Protein: Trace / Nitrite: Negative   Leuk Esterase: Large / RBC: 2 /HPF / WBC 99 /HPF   Sq Epi: x / Non Sq Epi: 1 /HPF / Bacteria: Many        RADIOLOGY & ADDITIONAL STUDIES:  CT Chest: Large left pleural effusion, moderate right effusion, abrupt cutoff of the left mainstem bronchus with total left lung collapse   Echo:EF 28%, severe global LV systolic dysfunction  ASSESSMENT:   87yMalePAST MEDICAL & SURGICAL HISTORY:  Diabetes Mellitus      Hypertension      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 in 17 yeras ago      Chronic Obstructive Pulmonary Disease (COPD)      Obstructive Sleep Apnea      Mycobacterium Avium-Intracellulare Infection  2009      Deep Vein Thrombosis (DVT)  17 yeras ago on Coumadin      CHF (congestive heart failure)  last exacerbation in 2017      Enlarged prostate      GERD (gastroesophageal reflux disease)      Hernia  umblical      Calculus of bile duct without cholangitis or cholecystitis without obstruction      Atrial fibrillation      COPD, severe      Chronic systolic congestive heart failure      H/O sick sinus syndrome      Cerebrovascular accident (CVA)      Hypothyroid      BPH (benign prostatic hyperplasia)      Chronic atrial fibrillation      S/P Hernia Repair      S/P cataract surgery, unspecified laterality      S/P ERCP  3/2017      S/P ERCP      H/O hernia repair      HEALTH ISSUES - PROBLEM Dx:  COPD, severe    Pleural effusion    Hypothyroid    Acute respiratory failure with hypoxia    BPH (benign prostatic hyperplasia)    H/O: CVA (cerebrovascular accident)    Nutrition, metabolism, and development symptoms    Chronic atrial fibrillation        HEALTH ISSUES - R/O PROBLEM Dx:  Bilateral pleural effusion     PLAN:  Add on for Flex Bronch, left pleurx cath placement   Continue to hold coumadin   Repeat Coags in Am   NPO after midnight   Above discussed with Dr. Rodney and medicine ACP

## 2022-10-25 NOTE — ADVANCED PRACTICE NURSE CONSULT - RECOMMEDATIONS
Recommend podiatry consult for neuropathic ulcers of left foot.   Recommend dermatology consult for atypical friable lesion on cervical spine.   Recommend endocrinology for acute glucose control to optimize wound healing.     Topical recommendations:     Bilateral posterior ears - Apply liquid barrier film daily. Offload nasal cannula from ears with trach ties.     Cervical spine - Cleanse with NS, pat dry. Apply Liquid barrier film to periwound skin (allow to dry). Cover with small silicone foam with border. Change daily and PRN if soiled.     Left upper posterior arm - Cleanse with NS, pat dry. Apply Liquid barrier film to periwound skin (allow to dry). Cover with small silicone foam with border. Change daily and PRN if soiled.     Bilateral groin - after cleansing, apply Interdry textile sheeting, between folds leaving 2 inches exposed at ends to wick, remove to wash & dry affected area, then replace. Individual sheeting may be used for up to 5 days unless soiled. Inspect skin daily.     L buttock, area of nonblanchable erythema - Apply liquid barrier film daily, monitor for tissue type changes.     Left foot ulcers - Paint with betadine daily, allow to dry. Cover with abdominal pad, secure with kerlix. Change daily and PRN if soiled.     Continue low air loss bed therapy, continue heel elevation with complete CAIR boots, continue to turn & reposition per protocol,, continue moisture management with single breathable pad, continue measures to decrease friction/shear/pressure.     Please contact Wound/Ostomy Care Service Line if we can be of further assistance (ext [phone]).    Recommend podiatry consult for neuropathic ulcers of left foot.   Recommend dermatology consult for atypical friable lesion on cervical spine.   Recommend endocrinology for acute glucose control to optimize wound healing.     Topical recommendations:     Bilateral posterior ears - Apply liquid barrier film daily. Offload nasal cannula from ears with trach ties.     Cervical spine - Cleanse with NS, pat dry. Apply Liquid barrier film to periwound skin (allow to dry). Cover with small silicone foam with border. Change daily and PRN if soiled.     Left upper posterior arm - Cleanse with NS, pat dry. Apply Liquid barrier film to periwound skin (allow to dry). Cover with small silicone foam with border. Change daily and PRN if soiled.     Bilateral groin - after cleansing, apply Interdry textile sheeting, between folds leaving 2 inches exposed at ends to wick, remove to wash & dry affected area, then replace. Individual sheeting may be used for up to 5 days unless soiled. Inspect skin daily.     L buttock, area of nonblanchable erythema - Apply liquid barrier film, allow to dry, can cover with small silicone foam with border to protect from friction and shear, change daily and PRN if soiled. Monitor for tissue type changes.     Left foot ulcers - Paint with betadine daily, allow to dry. Cover with abdominal pad, secure with kerlix. Change daily and PRN if soiled.     Areas of dry flaky skin - after cleansing, apply sween24 moisturizer daily. Avoid in between the toes.     Continue low air loss bed therapy, continue heel elevation with complete CAIR boots, continue to turn & reposition per protocol, continue moisture management with single breathable pad, continue measures to decrease friction/shear/pressure.     Please contact Wound/Ostomy Care Service Line if we can be of further assistance (ext [phone]).    Recommend podiatry consult for neuropathic ulcers of left foot. Consider workup to r/o arterial disease.   Recommend dermatology consult for atypical friable lesion on cervical spine.   Recommend endocrinology for acute glucose control to optimize wound healing.     Topical recommendations:     Bilateral posterior ears - Apply liquid barrier film daily. Offload nasal cannula from ears with trach ties.     Cervical spine - Cleanse with NS, pat dry. Apply Liquid barrier film to periwound skin (allow to dry). Cover with small silicone foam with border. Change daily and PRN if soiled.     Left upper posterior arm - Cleanse with NS, pat dry. Apply Liquid barrier film to periwound skin (allow to dry). Cover with small silicone foam with border. Change daily and PRN if soiled.     Bilateral groin - after cleansing, apply Interdry textile sheeting, between folds leaving 2 inches exposed at ends to wick, remove to wash & dry affected area, then replace. Individual sheeting may be used for up to 5 days unless soiled. Inspect skin daily.     L buttock, area of nonblanchable erythema - Apply liquid barrier film, allow to dry, can cover with small silicone foam with border to protect from friction and shear, change daily and PRN if soiled. Monitor for tissue type changes.     Left foot ulcers - Paint with betadine daily, allow to dry. Cover with abdominal pad, secure with kerlix. Change daily and PRN if soiled.     Areas of dry flaky skin - after cleansing, apply sween24 moisturizer daily. Avoid in between the toes.     Continue low air loss bed therapy, continue heel elevation with complete CAIR boots, continue to turn & reposition per protocol, continue moisture management with single breathable pad, continue measures to decrease friction/shear/pressure.     Please contact Wound/Ostomy Care Service Line if we can be of further assistance (ext [phone]).

## 2022-10-25 NOTE — DISCHARGE NOTE PROVIDER - CARE PROVIDER_API CALL
[facility],   Please call and follow up with [facility] Team for any questions, concerns, and changes in condition, pain/medical management.  Phone: (   )    -  Fax: (   )    -  Follow Up Time:

## 2022-10-25 NOTE — H&P ADULT - HISTORY OF PRESENT ILLNESS
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 He was having increased work of breathing at the center. When he came to the ED he was a/o times 3. He was placed on bipap for work of breathing. MICU and cardiology was consulted, given troponin elevation of 280 (is around his baseline from last admission). Collateral obtained per daughters given patient is confused, states no bleeding or melena, and that he had a drop in his hemoglobin around this time. His vbg shows CO2 of 65, w/ compensation of pH. Per daughters, no episodes of fevers, diarrhea, sob, headaches, or falls.     EKG: ventricular paced rhythm, no acute ST elevations.  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 He was having increased work of breathing at the center. When he came to the ED he was a/o times 3. He was placed on bipap for work of breathing. MICU and cardiology was consulted, given troponin elevation of 280 (is around his baseline from last admission). Collateral obtained per daughters given patient is confused, states no bleeding or melena, and that he had a drop in his hemoglobin around this time. His vbg shows CO2 of 65, w/ compensation of pH. Per daughters, no episodes of fevers, diarrhea, sob, headaches, or falls.     EKG: ventricular paced rhythm, no acute ST elevations.  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 He was having increased work of breathing at the center. When he came to the ED he was a/o times 3. He was placed on bipap for work of breathing. MICU and cardiology was consulted, given troponin elevation of 280 (is around his baseline from last admission). Collateral obtained per daughters given patient is confused, states no bleeding or melena, and that he had a drop in his hemoglobin around this time. His vbg shows CO2 of 65, w/ compensation of pH. Per daughters, no episodes of fevers, diarrhea, sob, headaches, or falls.     EKG: ventricular paced rhythm, no acute ST elevations.  Chest xray w/ large left pleural effusion and pulm vascular congestion

## 2022-10-25 NOTE — CONSULT NOTE ADULT - ASSESSMENT
TTE with Doppler (w/Cont) (04.03.22 @ 15:07) >  mild aortic stenosis. . Mild left ventricular systolic dysfunction. The inferior wall, and the inferoseptum are hypokinetic.  Echo 9/15/22: .EF 35-40% Severe segmental left ventricular systolic dysfunction. The mid to distal anteroseptum and severely hypokinetic. The mid to basal inferolateral wall is hypokinetic.      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 New right lung patchy and nodular areas of consolidation, likely  infectious/inflammatory  -Pulm eval  -Med eval  -repeat thoracentesis?  -recent echo with .EF 35-40% Severe segmental left ventricular systolic dysfunction. The mid to distal anteroseptum and severely hypokinetic. The mid to basal inferolateral wall is hypokinetic.    # acute on chronic systolic CHF   -diuretics   -repeat echo with moderate severe lv dysfxn, unchanged from echo  4/2022  -continue medical management of CMP/HF  -ecg, no ischemic changes, HS trop elevated secondary to hypoxia/ chf/ resp infection   -c/w bb     #Pafib, sp PPM     -c/w BB /ac       #mild as   -echo stable     #Abnl UA  -abx per med      70 minutes spent on total encounter; more than 50% of the visit was spent counseling and/or coordinating care by the attending physician.

## 2022-10-25 NOTE — CONSULT NOTE ADULT - SUBJECTIVE AND OBJECTIVE BOX
CARDIOLOGY CONSULT - Dr. Stephens  Date of Service: 10/25/22      HPI:  Patient is an 87 year old M hx of severe systolic CHF, b/l pleural effusion s/p thoracentesis in the past, SSS w/ pacemaker w/ f/u w/ EP, CVA w/ residual left sided UE weakness, chronic hudson, BPH, COPD/asthma, restrictive lung dx 2/2 obesity, right AKA, hypothyroid, afib on coumadin who presents from [facility] w/ lethargy and sob. He was having increased work of breathing at the center. When he came to the ED he was a/o times 3. He was placed on bipap for work of breathing. MICU and cardiology was consulted, given troponin elevation of 280 (is around his baseline from last admission). Collateral obtained per daughters given patient is confused, states no bleeding or melena, and that he had a drop in his hemoglobin around this time. His vbg shows CO2 of 65, w/ compensation of pH. Per daughters, no episodes of fevers, diarrhea, sob, headaches, or falls.     EKG: ventricular paced rhythm, no acute ST elevations.  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      Deep Vein Thrombosis (DVT)  17 yeras ago on Coumadin      CHF (congestive heart failure)  last exacerbation in 1/2017      Enlarged prostate      GERD (gastroesophageal reflux disease)      Hernia  umblical      Calculus of bile duct without cholangitis or cholecystitis without obstruction      Atrial fibrillation      COPD, severe      Chronic systolic congestive heart failure      H/O sick sinus syndrome      Cerebrovascular accident (CVA)      Hypothyroid      BPH (benign prostatic hyperplasia)      Chronic atrial fibrillation      S/P Hernia Repair      S/P cataract surgery, unspecified laterality      S/P ERCP  3/2017      S/P ERCP      H/O hernia repair              PREVIOUS DIAGNOSTIC TESTING:    [ ] Echocardiogram:  [ ]  Catheterization:  [ ] Stress Test:  	    MEDICATIONS:  MEDICATIONS  (STANDING):  aspirin enteric coated 81 milliGRAM(s) Oral daily  atorvastatin 80 milliGRAM(s) Oral at bedtime  BACItracin   Ointment 1 Application(s) Topical daily  finasteride 5 milliGRAM(s) Oral daily  furosemide   Injectable 40 milliGRAM(s) IV Push every 12 hours  levothyroxine Injectable 20 MICROGram(s) IV Push at bedtime  metoprolol tartrate 50 milliGRAM(s) Oral two times a day  montelukast 10 milliGRAM(s) Oral daily  pantoprazole  Injectable 40 milliGRAM(s) IV Push every 24 hours  polyethylene glycol 3350 17 Gram(s) Oral every 12 hours  tamsulosin 0.4 milliGRAM(s) Oral at bedtime      FAMILY HISTORY:  FH: HTN (hypertension) (Father)        SOCIAL HISTORY:    [ ] Non-smoker  [ ] Smoker  [ ] Alcohol    Allergies    No Known Allergies    Intolerances    	    REVIEW OF SYSTEMS:  CONSTITUTIONAL: No fever, weight loss, or fatigue  EYES: No eye pain, visual disturbances, or discharge  ENMT:  No difficulty hearing, tinnitus, vertigo; No sinus or throat pain  NECK: No pain or stiffness  RESPIRATORY: No cough, wheezing, chills or hemoptysis; No Shortness of Breath  CARDIOVASCULAR: No chest pain, palpitations, passing out, dizziness, or leg swelling  GASTROINTESTINAL: No abdominal or epigastric pain. No nausea, vomiting, or hematemesis; No diarrhea or constipation. No melena or hematochezia.  GENITOURINARY: No dysuria, frequency, hematuria, or incontinence  NEUROLOGICAL: No headaches, memory loss, loss of strength, numbness, or tremors  SKIN: No itching, burning, rashes, or lesions   	    [ ] All others negative	  [ ] Unable to obtain    PHYSICAL EXAM:  T(C): 36.8 (10-25-22 @ 08:30), Max: 36.8 (10-25-22 @ 08:30)  HR: 70 (10-25-22 @ 08:30) (70 - 109)  BP: 124/55 (10-25-22 @ 08:30) (124/55 - 145/77)  RR: 20 (10-25-22 @ 08:30) (20 - 23)  SpO2: 100% (10-25-22 @ 08:30) (95% - 100%)  Wt(kg): --  I&O's Summary    24 Oct 2022 07:01  -  25 Oct 2022 07:00  --------------------------------------------------------  IN: 0 mL / OUT: 1400 mL / NET: -1400 mL        Appearance: Normal	  Psychiatry: A & O x 3, Mood & affect appropriate  HEENT:   Normal oral mucosa, PERRL, EOMI	  Lymphatic: No lymphadenopathy  Cardiovascular: Normal S1 S2,RRR, No JVD, No murmurs  Respiratory: Lungs clear to auscultation	  Gastrointestinal:  Soft, Non-tender, + BS	  Skin: No rashes, No ecchymoses, No cyanosis	  Neurologic: Non-focal  Extremities: Normal range of motion, No clubbing, cyanosis or edema  Vascular: Peripheral pulses palpable 2+ bilaterally    TELEMETRY: 	    ECG:  	  RADIOLOGY:  OTHER: 	  	  LABS:	 	    CARDIAC MARKERS:                                  8.4    7.85  )-----------( 238      ( 25 Oct 2022 06:49 )             30.4     10-25    153<H>  |  109<H>  |  35<H>  ----------------------------<  187<H>  5.3   |  27  |  0.96    Ca    8.4      25 Oct 2022 06:49  Phos  4.0     10-25  Mg     2.00     10-25    TPro  5.7<L>  /  Alb  2.5<L>  /  TBili  0.5  /  DBili  x   /  AST  31  /  ALT  8   /  AlkPhos  90  10-25    PT/INR - ( 25 Oct 2022 06:49 )   PT: 24.0 sec;   INR: 2.05 ratio         PTT - ( 25 Oct 2022 06:49 )  PTT:34.5 sec  proBNP: Serum Pro-Brain Natriuretic Peptide: 4737 pg/mL (10-24 @ 21:30)    Lipid Profile:   HgA1c:   TSH: Thyroid Stimulating Hormone, Serum: 1.66 uIU/mL (10-25 @ 06:49)      ASSESSMENT/PLAN: 	              70 minutes spent on total encounter; more than 50% of the visit was spent counseling and/or coordinating care by the attending physician.

## 2022-10-25 NOTE — DISCHARGE NOTE PROVIDER - NSDCFUSCHEDAPPT_GEN_ALL_CORE_FT
[facility] [phone]  Scheduled Appointment: 12/09/2022

## 2022-10-25 NOTE — DISCHARGE NOTE PROVIDER - PROVIDER TOKENS
FREE:[LAST:[[facility]PHONE:[(   )    -],FAX:[(   )    -],ADDRESS:[Please call and follow up with [facility] Team for any questions, concerns, and changes in condition, pain/medical management.]]

## 2022-10-25 NOTE — CONSULT NOTE ADULT - ASSESSMENT
86 y/o M w/ left foot wounds  - Pt seen and evaluated  - Afebrile, no leukocytosis  - Punctate posterior heel wound to level of subq with fibrotic base, no undermining or tracking, no purulence or malodor, lateral 5th MPJ punctate wound to level of capsule with fibrotic base, mild periwound erythema, no drainage or clinical signs of infection. Right AKA.  - No wound culture obtained as it would likely yield skin contaminant  - L foot XR: No OM, no tracking soft tissue air  - Offload L heel with ZFLOW boot at all times  - Pod plan LWC  - Per daughter, pt and family are in agreement w/ vasc (Dr. Quinn) to not pursue any further intervention for revascularization of LLE, will forego ALMAZ/PVR at this time in the absence of tripp gangrene/ischemia  - Discussed with attending

## 2022-10-25 NOTE — H&P ADULT - ASSESSMENT
Patient is an 87 year old M hx of severe systolic CHF, b/l pleural effusion s/p thoracentesis in the past, SSS w/ pacemaker w/ f/u w/ EP, CVA w/ residual left sided UE weakness, chronic hudson, BPH, COPD/asthma, restrictive lung dx 2/2 obesity, right AKA, hypothyroid, afib on coumadin who presents from [facility] w/ lethargy and sob.

## 2022-10-25 NOTE — DISCHARGE NOTE PROVIDER - NSDCMRMEDTOKEN_GEN_ALL_CORE_FT
acetaminophen 325 mg oral tablet: 2 tab(s) orally every 6 hours, As needed, Moderate Pain (4 - 6)  acetylcysteine 20% inhalation solution: 4 milliliter(s) inhaled 4 times a day as needed  acetylcysteine 20% inhalation solution: 4 milliliter(s) inhaled 3 times a day  aspirin 81 mg oral delayed release tablet: 1 tab(s) orally once a day  Aspirin Enteric Coated 81 mg oral delayed release tablet: 1 tab(s) orally once a day  atorvastatin 80 mg oral tablet: 1 tab(s) orally once a day (at bedtime)  bacitracin 500 units/g topical ointment: Apply topically to affected area twice to back  budesonide 0.5 mg/2 mL inhalation suspension: 0.5 milligram(s) inhaled 2 times a day  budesonide 0.5 mg/2 mL inhalation suspension: 2 milliliter(s) inhaled 2 times a day  cadexomer iodine 0.9% topical gel: 1 application topically once a day  Coumadin 3 mg oral tablet: 1 tab(s) orally once a day (at bedtime)  Coumadin 4 mg oral tablet: 1 tab(s) orally once a day  finasteride 5 mg oral tablet: 1 tab(s) orally once a day  finasteride 5 mg oral tablet: 1 tab(s) orally once a day  Flomax 0.4 mg oral capsule: 1 cap(s) orally once a day  gabapentin 100 mg oral capsule: 1 tab(s) orally 2 times a day  gabapentin 100 mg oral tablet: 1 tab(s) orally 2 times a day  guaifenesin-dextromethorphan 100 mg-10 mg/5 mL oral liquid: 10 milliliter(s) orally every 6 hours  insulin glargine 100 units/mL subcutaneous solution: 10 unit(s) subcutaneous once a day (at bedtime)  insulin lispro 100 units/mL injectable solution: 14 unit(s) injectable once a day before dinner  insulin lispro 100 units/mL injectable solution: 14 unit(s) injectable once a day before lunch  insulin lispro 100 units/mL injectable solution: 20 unit(s) injectable once a day before Breakfast  insulin lispro 100 units/mL subcutaneous solution: 14 unit(s) subcutaneous before lunch and dinner  Iodosorb 0.9% topical gel: to heel  ipratropium: inhalation as needed  ipratropium-albuterol 0.5 mg-2.5 mg/3 mL inhalation solution: 3 milliliter(s) inhaled every 6 hours  Lantus 100 units/mL subcutaneous solution: 10 unit(s) subcutaneous once a day (at bedtime)  Lasix 20 mg oral tablet: 1 tab(s) orally once a day  Lipitor 80 mg oral tablet: 1 tab(s) orally once a day  medihoney:   metoprolol tartrate 50 mg oral tablet: 1 tab(s) orally 2 times a day  Metoprolol Tartrate 50 mg oral tablet: 1 tab(s) orally 2 times a day  montelukast 10 mg oral tablet: 1 tab(s) orally once a day (at bedtime)  Multiple Vitamins with Minerals oral tablet: 1 tab(s) orally once a day  pantoprazole 40 mg oral delayed release tablet: 1 tab(s) orally once a day (before a meal)  polyethylene glycol 3350 oral powder for reconstitution: 17 gram(s) orally once a day  predniSONE 50 mg oral tablet: 1 tab(s) orally once a day for one more dose on 9/24/22 then D/C  Protonix 40 mg oral delayed release tablet: 1 tab(s) orally once a day  senna leaf extract oral tablet: 2 tab(s) orally once a day (at bedtime)  Singulair 10 mg oral tablet: 1 tab(s) orally once a day  sodium chloride 3% inhalation solution: 4 milliliter(s) inhaled every 12 hours  Synthroid 25 mcg (0.025 mg) oral tablet: 1 tab(s) orally once a day  Synthroid 25 mcg (0.025 mg) oral tablet: 1 tab(s) orally once a day  tamsulosin 0.4 mg oral capsule: 2 cap(s) orally once a day (at bedtime)  zinc oxide topical cream: Apply topically to affected area twice to AKA

## 2022-10-25 NOTE — DISCHARGE NOTE PROVIDER - NSDCCPCAREPLAN_GEN_ALL_CORE_FT
PRINCIPAL DISCHARGE DIAGNOSIS  Diagnosis: CHF exacerbation  Assessment and Plan of Treatment:       SECONDARY DISCHARGE DIAGNOSES  Diagnosis: Pleural effusion  Assessment and Plan of Treatment:     Diagnosis: Acute respiratory failure with hypoxia  Assessment and Plan of Treatment:     Diagnosis: COPD, severe  Assessment and Plan of Treatment:     Diagnosis: Elevated troponin level  Assessment and Plan of Treatment:      PRINCIPAL DISCHARGE DIAGNOSIS  Diagnosis: Acute respiratory failure with hypoxia  Assessment and Plan of Treatment:       SECONDARY DISCHARGE DIAGNOSES  Diagnosis: Pleural effusion  Assessment and Plan of Treatment:     Diagnosis: COPD, severe  Assessment and Plan of Treatment: Smoking cessation, continue current medications as prescribed. Follow up with your routine physician's appointments.      Diagnosis: Elevated troponin level  Assessment and Plan of Treatment:     Diagnosis: Acute respiratory failure with hypoxia  Assessment and Plan of Treatment:

## 2022-10-25 NOTE — ED ADULT NURSE REASSESSMENT NOTE - NS ED NURSE REASSESS COMMENT FT1
Pt remains in stretcher; awake, alert, responsive to all stimuli. Able to verbalize needs. Pt remains on BIPAP; tolerating settings well. Cardiac monitoring ongoing. Vital signs stable. 0600 medications administered. Pt requesting food; NPO status maintained. Pt is admitted and pending bed assignment. Safety maintained. Will continue to monitor.

## 2022-10-25 NOTE — CHART NOTE - NSCHARTNOTEFT_GEN_A_CORE
Discussed with Tierra RICE, patient is planned for pleurX on 10/26/2022. Will make patient NPO at 23:59 10/25/2022 and repeat coags in AM.    Lola Cortez PA-C  pager [phone]

## 2022-10-26 PROBLEM — E03.9 HYPOTHYROIDISM, UNSPECIFIED: Chronic | Status: ACTIVE | Noted: 2022-01-01

## 2022-10-26 PROBLEM — I63.9 CEREBRAL INFARCTION, UNSPECIFIED: Chronic | Status: ACTIVE | Noted: 2022-01-01

## 2022-10-26 PROBLEM — I50.22 CHRONIC SYSTOLIC (CONGESTIVE) HEART FAILURE: Chronic | Status: ACTIVE | Noted: 2022-01-01

## 2022-10-26 PROBLEM — J44.9 CHRONIC OBSTRUCTIVE PULMONARY DISEASE, UNSPECIFIED: Chronic | Status: ACTIVE | Noted: 2022-01-01

## 2022-10-26 PROBLEM — Z86.79 PERSONAL HISTORY OF OTHER DISEASES OF THE CIRCULATORY SYSTEM: Chronic | Status: ACTIVE | Noted: 2022-01-01

## 2022-10-26 PROBLEM — I48.20 CHRONIC ATRIAL FIBRILLATION, UNSPECIFIED: Chronic | Status: ACTIVE | Noted: 2022-01-01

## 2022-10-26 PROBLEM — N40.0 BENIGN PROSTATIC HYPERPLASIA WITHOUT LOWER URINARY TRACT SYMPTOMS: Chronic | Status: ACTIVE | Noted: 2022-01-01

## 2022-10-26 NOTE — PROGRESS NOTE ADULT - SUBJECTIVE AND OBJECTIVE BOX
Patient is a 87y old  Male who presents with a chief complaint of Acute hypoxic respiratory failure (26 Oct 2022 19:01)       INTERVAL HPI/OVERNIGHT EVENTS:  Patient seen and evaluated at bedside.  Pt is resting comfortable in NAD. Denies N/V/F/C.  Pain rated at X/10    Allergies    No Known Allergies    Intolerances        Vital Signs Last 24 Hrs  T(C): 36.8 (26 Oct 2022 17:30), Max: 36.8 (25 Oct 2022 21:25)  T(F): 98.2 (26 Oct 2022 17:30), Max: 98.3 (26 Oct 2022 12:16)  HR: 90 (26 Oct 2022 17:30) (67 - 90)  BP: 126/70 (26 Oct 2022 17:30) (126/70 - 152/80)  BP(mean): --  RR: 19 (26 Oct 2022 17:30) (18 - 20)  SpO2: 91% (26 Oct 2022 17:30) (91% - 98%)    Parameters below as of 26 Oct 2022 17:30  Patient On (Oxygen Delivery Method): nasal cannula  O2 Flow (L/min): 3      LABS:                        8.0    7.61  )-----------( 222      ( 26 Oct 2022 05:15 )             28.3     10-    157<H>  |  112<H>  |  40<H>  ----------------------------<  280<H>  3.6   |  34<H>  |  1.04    Ca    8.4      26 Oct 2022 18:20  Phos  2.6     10-  Mg     1.80     10-    TPro  5.7<L>  /  Alb  2.5<L>  /  TBili  0.5  /  DBili  x   /  AST  31  /  ALT  8   /  AlkPhos  90  10-25    PT/INR - ( 26 Oct 2022 05:15 )   PT: 29.5 sec;   INR: 2.52 ratio         PTT - ( 26 Oct 2022 05:15 )  PTT:34.1 sec  Urinalysis Basic - ( 25 Oct 2022 07:30 )    Color: Light Yellow / Appearance: Slightly Turbid / S.012 / pH: x  Gluc: x / Ketone: Trace  / Bili: Negative / Urobili: <2 mg/dL   Blood: x / Protein: Trace / Nitrite: Negative   Leuk Esterase: Large / RBC: 2 /HPF / WBC 99 /HPF   Sq Epi: x / Non Sq Epi: 1 /HPF / Bacteria: Many      CAPILLARY BLOOD GLUCOSE      POCT Blood Glucose.: 282 mg/dL (26 Oct 2022 18:19)  POCT Blood Glucose.: 279 mg/dL (26 Oct 2022 18:18)  POCT Blood Glucose.: 231 mg/dL (25 Oct 2022 22:41)      Lower Extremity Physical Exam:  s/p RLE BKA amputation  Vasular: DP/PT 0/4, L, CFT < 3 seconds L, Temperature gradient warm to cool, L.   Neuro: Epicritic sensation diminished to the level of the toes, L.  Musculoskeletal/Ortho: R BKA  Skin: punctate posterior heel wound to level of subq with fibrotic base, no undermining or tracking, no purulence or malodor, lateral 5th MPJ punctate wound to level of capsule with fibrotic base, mild periwound erythema, no drainage or clinical signs of infection. Right BKA.  RADIOLOGY & ADDITIONAL TESTS:

## 2022-10-26 NOTE — PROGRESS NOTE ADULT - SUBJECTIVE AND OBJECTIVE BOX
SUBJECTIVE/ OVERNIGHT EVENTS:  events reviewed.   overall feels okay  breathing stable but not optimal  hypoxic on room air  stable on room air  no complaints.   tele reviewed.   no cp, no sob, no n/v/d.  no abd pain.   Na repeat now normal    --------------------------------------------------------------------------------------------  LABS:                        8.0    7.61  )-----------( 222      ( 26 Oct 2022 05:15 )             28.3     10-26    139  |  99  |  37<H>  ----------------------------<  665<HH>  3.0<L>   |  30  |  0.93    Ca    7.7<L>      26 Oct 2022 16:40  Phos  2.3     10-26  Mg     1.70     10-26    TPro  5.7<L>  /  Alb  2.5<L>  /  TBili  0.5  /  DBili  x   /  AST  31  /  ALT  8   /  AlkPhos  90  10-25    PT/INR - ( 26 Oct 2022 05:15 )   PT: 29.5 sec;   INR: 2.52 ratio         PTT - ( 26 Oct 2022 05:15 )  PTT:34.1 sec  CAPILLARY BLOOD GLUCOSE      POCT Blood Glucose.: 282 mg/dL (26 Oct 2022 18:19)  POCT Blood Glucose.: 279 mg/dL (26 Oct 2022 18:18)  POCT Blood Glucose.: 231 mg/dL (25 Oct 2022 22:41)    CARDIAC MARKERS ( 24 Oct 2022 23:05 )  x     / x     / 74 U/L / x     / 8.2 ng/mL      Urinalysis Basic - ( 25 Oct 2022 07:30 )    Color: Light Yellow / Appearance: Slightly Turbid / S.012 / pH: x  Gluc: x / Ketone: Trace  / Bili: Negative / Urobili: <2 mg/dL   Blood: x / Protein: Trace / Nitrite: Negative   Leuk Esterase: Large / RBC: 2 /HPF / WBC 99 /HPF   Sq Epi: x / Non Sq Epi: 1 /HPF / Bacteria: Many        RADIOLOGY & ADDITIONAL TESTS:    Imaging Personally Reviewed:  [x] YES  [ ] NO    Consultant(s) Notes Reviewed:  [x] YES  [ ] NO    MEDICATIONS  (STANDING):  aspirin enteric coated 81 milliGRAM(s) Oral daily  atorvastatin 80 milliGRAM(s) Oral at bedtime  BACItracin   Ointment 1 Application(s) Topical daily  buDESOnide    Inhalation Suspension 0.5 milliGRAM(s) Inhalation every 12 hours  collagenase Ointment 1 Application(s) Topical daily  dextrose 5%. 1000 milliLiter(s) (50 mL/Hr) IV Continuous <Continuous>  finasteride 5 milliGRAM(s) Oral daily  furosemide   Injectable 40 milliGRAM(s) IV Push every 12 hours  levothyroxine Injectable 20 MICROGram(s) IV Push at bedtime  metoprolol tartrate 50 milliGRAM(s) Oral two times a day  montelukast 10 milliGRAM(s) Oral daily  pantoprazole  Injectable 40 milliGRAM(s) IV Push every 24 hours  polyethylene glycol 3350 17 Gram(s) Oral every 12 hours  tamsulosin 0.4 milliGRAM(s) Oral at bedtime    MEDICATIONS  (PRN):  albuterol/ipratropium for Nebulization 3 milliLiter(s) Nebulizer every 6 hours PRN Shortness of Breath and/or Wheezing      Care Discussed with Consultants/Other Providers [x] YES  [ ] NO    Vital Signs Last 24 Hrs  T(C): 36.8 (26 Oct 2022 17:30), Max: 36.8 (25 Oct 2022 21:25)  T(F): 98.2 (26 Oct 2022 17:30), Max: 98.3 (26 Oct 2022 12:16)  HR: 90 (26 Oct 2022 17:30) (67 - 90)  BP: 126/70 (26 Oct 2022 17:30) (126/70 - 152/80)  BP(mean): --  RR: 19 (26 Oct 2022 17:30) (18 - 20)  SpO2: 91% (26 Oct 2022 17:30) (91% - 98%)    Parameters below as of 26 Oct 2022 17:30  Patient On (Oxygen Delivery Method): nasal cannula  O2 Flow (L/min): 3    I&O's Summary    25 Oct 2022 07:01  -  26 Oct 2022 07:00  --------------------------------------------------------  IN: 0 mL / OUT: 1700 mL / NET: -1700 mL    26 Oct 2022 07:01  -  26 Oct 2022 19:01  --------------------------------------------------------  IN: 0 mL / OUT: 1500 mL / NET: -1500 mL      PHYSICAL EXAM:  GENERAL: NAD, well-developed, obese man, comfortable on nasal canula  HEAD:  Atraumatic, Normocephalic  EYES: EOMI, PERRLA, conjunctiva and sclera clear  NECK: Supple, No JVD  CHEST/LUNG: mild decrease breath sounds bilaterally; No wheeze   HEART: Irregular rate and rhythm; No murmurs, rubs, or gallops  ABDOMEN: Soft, Nontender, Nondistended; Bowel sounds present  : [place] in place, britney color urine, neg CVAT   Neuro: AAOx3, no focal weakness, mild left UE weakness baseline   EXTREMITIES:  2+ Peripheral Pulses, No clubbing, cyanosis, + edema  SKIN: No rashes or lesions

## 2022-10-26 NOTE — SWALLOW BEDSIDE ASSESSMENT ADULT - ASR SWALLOW DENTITION
bottom dentition; Patient attempted to place upper dentures, however loose fitting therefore upper dentures were removed/upper dentures

## 2022-10-26 NOTE — SWALLOW BEDSIDE ASSESSMENT ADULT - ADDITIONAL RECOMMENDATIONS
This service to follow up as schedule permits for diet tolerance. Medical team advised to reconsult if there is a change in meical status and/or observed change in patient's tolerance of recommended PO Diet. This service to follow up as schedule permits for diet tolerance. Medical team advised to reconsult if there is a change in medical status and/or observed change in patient's tolerance of recommended PO Diet. Advancement Flap (Single) Text: The defect edges were debeveled with a #15 scalpel blade.  Given the location of the defect and the proximity to free margins a single advancement flap was deemed most appropriate.  Using a sterile surgical marker, an appropriate advancement flap was drawn incorporating the defect and placing the expected incisions within the relaxed skin tension lines where possible.    The area thus outlined was incised deep to adipose tissue with a #15 scalpel blade.  The skin margins were undermined to an appropriate distance in all directions utilizing iris scissors.

## 2022-10-26 NOTE — SWALLOW BEDSIDE ASSESSMENT ADULT - ASR SWALLOW REFERRAL
Patient will benefit oral supplements to maximize caloric needs (e.g. Ensure Pudding)/Registered Dietitian

## 2022-10-26 NOTE — SWALLOW BEDSIDE ASSESSMENT ADULT - SLP GENERAL OBSERVATIONS
Patient is alert and oriented; able to follow commands and express simple needs. Patient's daughter (Yael) is present. Patient utilizing yankauer when coughing up thick phlegm/mucous to remove from the oral cavity.

## 2022-10-26 NOTE — SWALLOW BEDSIDE ASSESSMENT ADULT - ASR SWALLOW RECOMMEND DIAG
Objective testing is not warranted given Patient with overt signs of Aspiration for Thin Liquids with Nursing and Mildly Thick Liquids during this clinical swallow evaluation. Patient tolerated puree/moderately thick liquids without signs of Aspiration and no desaturation episode.

## 2022-10-26 NOTE — SWALLOW BEDSIDE ASSESSMENT ADULT - SWALLOW EVAL: DIAGNOSIS
Patient presents with OroPharyngeal Stage Dysphagia characterized by adequate oral containment with teaspoon utensil presentation, slow bolus manipulation and transfer for puree/moderately thick liquids; suspect premature loss/spillage for mildly thick liquids; adequate oral clearance post swallow. There is laryngeal elevation upon palpation, suspect delayed initiation of the  pharyngeal swallow. There were overt signs of impaired airway protection for Mildly Thick Liquids. Nursing came into Patient's room twice given desaturation episode occurred on monitor when consuming Mildly Thick Liquids. There were no overt signs of impaired airway protection for puree/moderately thick liquids with no desaturation episodes.

## 2022-10-26 NOTE — PROGRESS NOTE ADULT - PROBLEM SELECTOR PLAN 1
-2/2 large pleural effusion and pulm vasc congestion  -troponin elevation likely 2/2 CHF exacerbation (stable x 3 sets). card following   -TTE: Severe global left ventricular systolic dysfunction. EF 28%  -tele monitoring  -Lasix 40 mg iv BID, has chronic hudson for strict ins and outs.  -bipap 14/7 w/ fio2 60%  -pulm consulted  - CT surgery eval for thoracentesis (INR 2.5, procedure canceled)  - repeat labs in am

## 2022-10-26 NOTE — CONSULT NOTE ADULT - ASSESSMENT
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 w/ lethargy and sob.     Hypernatremia  The patient appears hypervolemic  Agreed with Lasix 40mg IV BID  Agreed with IV D5W with continual diuresis to promote natreuresis  BMP q12hrs for now    B/L Pleural effusions + Left lung collapse  Per thoracic surgery      Thank you for allowing me to participate in the care of your patient    For any question, call:  Cell # [phone]  Pager # [phone]  Callback # [phone]

## 2022-10-26 NOTE — PROGRESS NOTE ADULT - ASSESSMENT
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

## 2022-10-26 NOTE — CHART NOTE - NSCHARTNOTEFT_GEN_A_CORE
Spoke with Dr. Heller regarding sodium level of 157. Planned for Pleurx in am. Will continue with D5 @ 50 cc and follow up Na level. If improving will continue fluids at current rate. If worsening will increase rate to 75cc/hr and hold am Lasix.     Na at 00:00 is improving at 154. Will continue fluids at 50cc/hr.     DWAYNE Starr  Department of Medicine   In House # [id number]

## 2022-10-26 NOTE — SWALLOW BEDSIDE ASSESSMENT ADULT - PHARYNGEAL PHASE
Cough post oral intake No cough/throat clear/vocal quality is clear/Within functional limits No cough/throat clearing/vocal quality is clear/Within functional limits

## 2022-10-26 NOTE — PROGRESS NOTE ADULT - ASSESSMENT
86 y/o M w/ left foot wounds  - Pt seen and evaluated  - Afebrile, no leukocytosis  - Punctate posterior heel wound to level of subq with fibrotic base, no undermining or tracking, no purulence or malodor, lateral 5th MPJ punctate wound to level of capsule with fibrotic base, mild periwound erythema, no drainage or clinical signs of infection. Right AKA.  - No wound culture obtained as it would likely yield skin contaminant  - L foot XR: No OM, no tracking soft tissue air  - Offload L heel with ZFLOW boot at all times  - Pod plan LWC  - Per daughter, pt and family are in agreement w/ vasc (Dr. Quinn) to not pursue any further intervention for revascularization of LLE, will forego ALMAZ/PVR at this time in the absence of tripp gangrene/ischemia  - Will follow

## 2022-10-26 NOTE — PROGRESS NOTE ADULT - SUBJECTIVE AND OBJECTIVE BOX
CARDIOLOGY FOLLOW UP - Dr. Stephens  Date of Service: 10/26/22  CC: pt awake, appears comfortable, mildly confused    Review of Systems:  Constitutional: No fever, weight loss, or fatigue  Respiratory: No cough, wheezing, or hemoptysis, no shortness of breath  Cardiovascular: No chest pain, palpitations, passing out, dizziness, or leg swelling  Gastrointestinal: No abd or epigastric pain. No nausea, vomiting, or hematemesis; no diarrhea or consiptaiton, no melena or hematochezia  Vascular: No edema     TELEMETRY:    PHYSICAL EXAM:  T(C): 36.4 (10-26-22 @ 05:00), Max: 36.8 (10-25-22 @ 21:25)  HR: 67 (10-26-22 @ 09:26) (67 - 104)  BP: 139/60 (10-26-22 @ 05:00) (127/72 - 152/80)  RR: 18 (10-26-22 @ 05:00) (16 - 20)  SpO2: 98% (10-26-22 @ 09:26) (93% - 98%)  Wt(kg): --  I&O's Summary    25 Oct 2022 07:01  -  26 Oct 2022 07:00  --------------------------------------------------------  IN: 0 mL / OUT: 1700 mL / NET: -1700 mL    26 Oct 2022 07:01  -  26 Oct 2022 11:23  --------------------------------------------------------  IN: 0 mL / OUT: 900 mL / NET: -900 mL        Appearance: Normal	  Cardiovascular: Normal S1 S2,RRR, No JVD, No murmurs  Respiratory: Lungs clear to auscultation	  Gastrointestinal:  Soft, Non-tender, + BS	  Extremities: Normal range of motion, No clubbing, cyanosis or edema  Vascular: Peripheral pulses palpable 2+ bilaterally       Home Medications:  acetaminophen 325 mg oral tablet: 2 tab(s) orally every 6 hours, As needed, Moderate Pain (4 - 6) (23 Sep 2022 16:43)  acetylcysteine 20% inhalation solution: 4 milliliter(s) inhaled 4 times a day as needed (25 Oct 2022 10:37)  acetylcysteine 20% inhalation solution: 4 milliliter(s) inhaled 3 times a day (23 Sep 2022 16:43)  aspirin 81 mg oral delayed release tablet: 1 tab(s) orally once a day (23 Sep 2022 16:43)  Aspirin Enteric Coated 81 mg oral delayed release tablet: 1 tab(s) orally once a day (25 Oct 2022 10:37)  atorvastatin 80 mg oral tablet: 1 tab(s) orally once a day (at bedtime) (23 Sep 2022 16:43)  bacitracin 500 units/g topical ointment: Apply topically to affected area twice to back (25 Oct 2022 10:37)  budesonide 0.5 mg/2 mL inhalation suspension: 0.5 milligram(s) inhaled 2 times a day (14 Sep 2022 19:13)  budesonide 0.5 mg/2 mL inhalation suspension: 2 milliliter(s) inhaled 2 times a day (25 Oct 2022 10:37)  cadexomer iodine 0.9% topical gel: 1 application topically once a day (23 Sep 2022 16:43)  Coumadin 3 mg oral tablet: 1 tab(s) orally once a day (at bedtime) (23 Sep 2022 16:43)  Coumadin 4 mg oral tablet: 1 tab(s) orally once a day (25 Oct 2022 10:37)  finasteride 5 mg oral tablet: 1 tab(s) orally once a day (14 Sep 2022 19:13)  finasteride 5 mg oral tablet: 1 tab(s) orally once a day (25 Oct 2022 10:37)  Flomax 0.4 mg oral capsule: 1 cap(s) orally once a day (25 Oct 2022 10:37)  gabapentin 100 mg oral capsule: 1 tab(s) orally 2 times a day (25 Oct 2022 10:37)  gabapentin 100 mg oral tablet: 1 tab(s) orally 2 times a day (14 Sep 2022 19:13)  guaifenesin-dextromethorphan 100 mg-10 mg/5 mL oral liquid: 10 milliliter(s) orally every 6 hours (23 Sep 2022 16:43)  insulin glargine 100 units/mL subcutaneous solution: 10 unit(s) subcutaneous once a day (at bedtime) (23 Sep 2022 16:43)  insulin lispro 100 units/mL injectable solution: 14 unit(s) injectable once a day before dinner (23 Sep 2022 18:00)  insulin lispro 100 units/mL injectable solution: 14 unit(s) injectable once a day before lunch (23 Sep 2022 18:00)  insulin lispro 100 units/mL injectable solution: 20 unit(s) injectable once a day before Breakfast (23 Sep 2022 18:00)  insulin lispro 100 units/mL subcutaneous solution: 14 unit(s) subcutaneous before lunch and dinner (25 Oct 2022 10:37)  Iodosorb 0.9% topical gel: to heel (25 Oct 2022 10:37)  ipratropium: inhalation as needed (25 Oct 2022 10:37)  ipratropium-albuterol 0.5 mg-2.5 mg/3 mL inhalation solution: 3 milliliter(s) inhaled every 6 hours (23 Sep 2022 16:43)  Lantus 100 units/mL subcutaneous solution: 10 unit(s) subcutaneous once a day (at bedtime) (25 Oct 2022 10:37)  Lasix 20 mg oral tablet: 1 tab(s) orally once a day (25 Oct 2022 10:37)  Lipitor 80 mg oral tablet: 1 tab(s) orally once a day (25 Oct 2022 10:37)  medihoney:  (25 Oct 2022 10:37)  metoprolol tartrate 50 mg oral tablet: 1 tab(s) orally 2 times a day (23 Sep 2022 16:43)  Metoprolol Tartrate 50 mg oral tablet: 1 tab(s) orally 2 times a day (25 Oct 2022 10:37)  montelukast 10 mg oral tablet: 1 tab(s) orally once a day (at bedtime) (23 Sep 2022 16:43)  Multiple Vitamins with Minerals oral tablet: 1 tab(s) orally once a day (23 Sep 2022 16:43)  pantoprazole 40 mg oral delayed release tablet: 1 tab(s) orally once a day (before a meal) (23 Sep 2022 16:43)  polyethylene glycol 3350 oral powder for reconstitution: 17 gram(s) orally once a day (23 Sep 2022 16:43)  predniSONE 50 mg oral tablet: 1 tab(s) orally once a day for one more dose on 9/24/22 then D/C (23 Sep 2022 16:43)  Protonix 40 mg oral delayed release tablet: 1 tab(s) orally once a day (25 Oct 2022 10:37)  senna leaf extract oral tablet: 2 tab(s) orally once a day (at bedtime) (23 Sep 2022 16:43)  Singulair 10 mg oral tablet: 1 tab(s) orally once a day (25 Oct 2022 10:37)  sodium chloride 3% inhalation solution: 4 milliliter(s) inhaled every 12 hours (23 Sep 2022 16:43)  Synthroid 25 mcg (0.025 mg) oral tablet: 1 tab(s) orally once a day (25 Oct 2022 10:37)  Synthroid 25 mcg (0.025 mg) oral tablet: 1 tab(s) orally once a day (14 Sep 2022 19:13)  tamsulosin 0.4 mg oral capsule: 2 cap(s) orally once a day (at bedtime) (14 Sep 2022 19:13)  zinc oxide topical cream: Apply topically to affected area twice to AKA (25 Oct 2022 10:37)        MEDICATIONS  (STANDING):  aspirin enteric coated 81 milliGRAM(s) Oral daily  atorvastatin 80 milliGRAM(s) Oral at bedtime  BACItracin   Ointment 1 Application(s) Topical daily  buDESOnide    Inhalation Suspension 0.5 milliGRAM(s) Inhalation every 12 hours  collagenase Ointment 1 Application(s) Topical daily  dextrose 5%. 1000 milliLiter(s) (50 mL/Hr) IV Continuous <Continuous>  finasteride 5 milliGRAM(s) Oral daily  furosemide   Injectable 40 milliGRAM(s) IV Push every 12 hours  levothyroxine Injectable 20 MICROGram(s) IV Push at bedtime  metoprolol tartrate 50 milliGRAM(s) Oral two times a day  montelukast 10 milliGRAM(s) Oral daily  pantoprazole  Injectable 40 milliGRAM(s) IV Push every 24 hours  polyethylene glycol 3350 17 Gram(s) Oral every 12 hours  potassium chloride   Powder 40 milliEquivalent(s) Oral every 4 hours  tamsulosin 0.4 milliGRAM(s) Oral at bedtime        EKG:  RADIOLOGY:  DIAGNOSTIC TESTING:  [ ] Echocardiogram:  [ ] Catherterization:  [ ] Stress Test:  OTHER:     LABS:	 	                          8.0    7.61  )-----------( 222      ( 26 Oct 2022 05:15 )             28.3     10-26    153<H>  |  109<H>  |  44<H>  ----------------------------<  191<H>  3.2<L>   |  31  |  1.08    Ca    8.5      26 Oct 2022 05:15  Phos  3.2     10-26  Mg     1.90     10-26    TPro  5.7<L>  /  Alb  2.5<L>  /  TBili  0.5  /  DBili  x   /  AST  31  /  ALT  8   /  AlkPhos  90  10-25      PT/INR - ( 26 Oct 2022 05:15 )   PT: 29.5 sec;   INR: 2.52 ratio         PTT - ( 26 Oct 2022 05:15 )  PTT:34.1 sec    CARDIAC MARKERS:

## 2022-10-26 NOTE — PROGRESS NOTE ADULT - NSPROGADDITIONALINFOA_GEN_ALL_CORE
d/w pt and NP.  d/w card.  d/w Dr. Padmaja Spaulding PCP. Away until 11/2. Will cover pt until then.     - Dr. SADIE Mandujanoet ([facility])  - [phone] d/w pt and NP.  d/w card.  d/w Dr. Padmaja Spaulding PCP. Away until 11/2. Will cover pt until then.     Na corrected from 153 to 139. d/w renal. will hold D5W IVF for now.   repeat labs in am.     - Dr. SADIE Heller ([facility])  - [phone] d/w pt and NP.  d/w card.  d/w Dr. Padmaja Spaulding PCP. Away until 11/2. Will cover pt until then.     Na corrected from 153 to 139. d/w renal.   Labs repeated. Na actually is 157.  c/w D5W IVF.     - Dr. SADIE Heller ([facility])  - [phone] d/w pt and NP.  d/w card.  d/w Dr. Padmaja Spaulding PCP. Away until 11/2. Will cover pt until then.     Na corrected from 153 to 139. d/w renal.   Labs repeated. Na actually is 157.  c/w D5W IVF.   will repeat lab midnight and will increase D5W rate if still hypernatremic.     - Dr. SADIE Mandujanoet ([facility])  - [phone]

## 2022-10-26 NOTE — PROGRESS NOTE ADULT - ASSESSMENT
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

## 2022-10-26 NOTE — SWALLOW BEDSIDE ASSESSMENT ADULT - SWALLOW EVAL: RECOMMENDED FEEDING/EATING TECHNIQUES
Feeding/Swallowing Guidelines: Upright position as tolerated, Puree and Moderately Thick Liquids via Teaspoon at a time, Feed Slowly, allow patient to swallow prior to next presentation,

## 2022-10-26 NOTE — PROGRESS NOTE ADULT - SUBJECTIVE AND OBJECTIVE BOX
Date of Service: 10-26-22 @ 15:16    Patient is a 87y old  Male who presents with a chief complaint of Acute hypoxic respiratory failure (26 Oct 2022 11:23)      Any change in ROS: she is doing reasonable    MEDICATIONS  (STANDING):  aspirin enteric coated 81 milliGRAM(s) Oral daily  atorvastatin 80 milliGRAM(s) Oral at bedtime  BACItracin   Ointment 1 Application(s) Topical daily  buDESOnide    Inhalation Suspension 0.5 milliGRAM(s) Inhalation every 12 hours  collagenase Ointment 1 Application(s) Topical daily  dextrose 5%. 1000 milliLiter(s) (50 mL/Hr) IV Continuous <Continuous>  finasteride 5 milliGRAM(s) Oral daily  furosemide   Injectable 40 milliGRAM(s) IV Push every 12 hours  levothyroxine Injectable 20 MICROGram(s) IV Push at bedtime  metoprolol tartrate 50 milliGRAM(s) Oral two times a day  montelukast 10 milliGRAM(s) Oral daily  pantoprazole  Injectable 40 milliGRAM(s) IV Push every 24 hours  polyethylene glycol 3350 17 Gram(s) Oral every 12 hours  potassium chloride   Powder 40 milliEquivalent(s) Oral every 4 hours  tamsulosin 0.4 milliGRAM(s) Oral at bedtime    MEDICATIONS  (PRN):  albuterol/ipratropium for Nebulization 3 milliLiter(s) Nebulizer every 6 hours PRN Shortness of Breath and/or Wheezing    Vital Signs Last 24 Hrs  T(C): 36.8 (26 Oct 2022 12:16), Max: 36.8 (25 Oct 2022 21:25)  T(F): 98.3 (26 Oct 2022 12:16), Max: 98.3 (26 Oct 2022 12:16)  HR: 70 (26 Oct 2022 12:16) (67 - 104)  BP: 130/62 (26 Oct 2022 12:16) (130/62 - 152/80)  BP(mean): --  RR: 19 (26 Oct 2022 12:16) (18 - 20)  SpO2: 95% (26 Oct 2022 12:16) (95% - 98%)    Parameters below as of 26 Oct 2022 12:16  Patient On (Oxygen Delivery Method): nasal cannula  O2 Flow (L/min): 3      I&O's Summary    25 Oct 2022 07:01  -  26 Oct 2022 07:00  --------------------------------------------------------  IN: 0 mL / OUT: 1700 mL / NET: -1700 mL    26 Oct 2022 07:01  -  26 Oct 2022 15:16  --------------------------------------------------------  IN: 0 mL / OUT: 900 mL / NET: -900 mL          Physical Exam:   GENERAL: NAD, well-groomed, well-developed  HEENT: PETRONA/   Atraumatic, Normocephalic  ENMT: No tonsillar erythema, exudates, or enlargement; Moist mucous membranes, Good dentition, No lesions  NECK: Supple, No JVD, Normal thyroid  CHEST/LUNG: decreased air entery leatha bases:  CVS: Regular rate and rhythm; No murmurs, rubs, or gallops  GI: : Soft, Nontender, Nondistended; Bowel sounds present  NERVOUS SYSTEM:  Alert & awake  EXTREMITIES: +edema  LYMPH: No lymphadenopathy noted  SKIN: No rashes or lesions  ENDOCRINOLOGY: No Thyromegaly  PSYCH: Appropriate    Labs:  36, 39  CARDIAC MARKERS ( 24 Oct 2022 23:05 )  x     / x     / 74 U/L / x     / 8.2 ng/mL                            8.0    7.61  )-----------( 222      ( 26 Oct 2022 05:15 )             28.3                         8.4    7.85  )-----------( 238      ( 25 Oct 2022 06:49 )             30.4                         8.2    8.67  )-----------( 267      ( 24 Oct 2022 21:30 )             30.0     10-    153<H>  |  109<H>  |  44<H>  ----------------------------<  191<H>  3.2<L>   |  31  |  1.08  10-25    153<H>  |  109<H>  |  35<H>  ----------------------------<  187<H>  5.3   |  27  |  0.96  10-24    156<H>  |  112<H>  |  34<H>  ----------------------------<  63<L>  3.8   |  35<H>  |  0.98    Ca    8.5      26 Oct 2022 05:15  Ca    8.4      25 Oct 2022 06:49  Ca    8.9      24 Oct 2022 21:30  Phos  3.2     10-  Phos  4.0     10-25  Mg     1.90     10-  Mg     2.00     10-25  Mg     1.80     10-24    TPro  5.7<L>  /  Alb  2.5<L>  /  TBili  0.5  /  DBili  x   /  AST  31  /  ALT  8   /  AlkPhos  90  10-25  TPro  5.9<L>  /  Alb  2.5<L>  /  TBili  0.5  /  DBili  x   /  AST  13  /  ALT  7   /  AlkPhos  93  10-24    CAPILLARY BLOOD GLUCOSE      POCT Blood Glucose.: 231 mg/dL (25 Oct 2022 22:41)      LIVER FUNCTIONS - ( 25 Oct 2022 06:49 )  Alb: 2.5 g/dL / Pro: 5.7 g/dL / ALK PHOS: 90 U/L / ALT: 8 U/L / AST: 31 U/L / GGT: x           PT/INR - ( 26 Oct 2022 05:15 )   PT: 29.5 sec;   INR: 2.52 ratio         PTT - ( 26 Oct 2022 05:15 )  PTT:34.1 sec  Urinalysis Basic - ( 25 Oct 2022 07:30 )    Color: Light Yellow / Appearance: Slightly Turbid / S.012 / pH: x  Gluc: x / Ketone: Trace  / Bili: Negative / Urobili: <2 mg/dL   Blood: x / Protein: Trace / Nitrite: Negative   Leuk Esterase: Large / RBC: 2 /HPF / WBC 99 /HPF   Sq Epi: x / Non Sq Epi: 1 /HPF / Bacteria: Many      D-Dimer Assay, Quantitative: 399 ng/mL DDU (10-25 @ 01:05)  Procalcitonin, Serum: 0.26 ng/mL (10-24 @ 23:05)  Serum Pro-Brain Natriuretic Peptide: 4737 pg/mL (10-24 @ 21:30)        RECENT CULTURES:      rad< from: CT Chest No Cont (10.25.22 @ 16:56) >  inating in right atrium and right ventricle. Coronary and aortic   calcifications.    AIRWAYS/LUNGS/PLEURA: Complete opacification of left lung secondary to   pleural effusion and left main bronchial collapse as well as distal   bronchial collapse. Prior studies dating back to 5/3/2022 demonstrate   similar bronchial secretions with impaired clearance, concerning for   endobronchial mass.    Interval increase in size of right sided pleural effusion.    Centrilobular emphysema, similar in appearance to prior CT.    Peripheral tree-in-bud nodular opacities in the right upper and lower   lobes.    UPPER ABDOMEN: Cholelithiasis.     BONES/SOFT TISSUES: Degenerative changes. Old fracture of left 11th and   ninth ribs.    IMPRESSION:    Complete opacification of left lung secondary to pleural effusion and   left main bronchial collapse with complete atelectasis. Prior studies   dating back to 5/3/2022 demonstrate similar endobronchial secretions with   impaired mucus clearance. Bronchoscopy can be performed to evaluate for   endobronchial mass if deemed clinically necessary.    Interval increase in right-sided pleural effusion from prior CT.    Peripheral tree-in-bud nodular opacities in right upper and lower lobe   secondary to mucoid impaction.    Centrilobular emphysema.    --- End of Report ---    < end of copied text >    RESPIRATORY CULTURES:          Studies  Chest X-RAY  CT SCAN Chest   Venous Dopplers: LE:   CT Abdomen  Others

## 2022-10-26 NOTE — PROGRESS NOTE ADULT - ASSESSMENT
TTE with Doppler (w/Cont) (04.03.22 @ 15:07) >  mild aortic stenosis. . Mild left ventricular systolic dysfunction. The inferior wall, and the inferoseptum are hypokinetic.  Echo 9/15/22: .EF 35-40% Severe segmental left ventricular systolic dysfunction. The mid to distal anteroseptum and severely hypokinetic. The mid to basal inferolateral wall is hypokinetic.      a/p  86 yo male pmhx BPH, PAD s/p R BKA, COPD (not on home O2), HTN, HLD, afib, PPM ([employer])  CVA w/ residual L hemiparesis on coumadin, insulin-dependent diabetes, sys CHF on lasix presents with shortness of breath    #SOB   -secondary to chf and large left effusuion  -plan for bronch and pleurex per thoracic, caste postponed due to INR  -decrease Lasix to daily  -prior cta chest with no pe, Moderate left and small right pleural effusions, increased since May  2022, with worsening lower lobe compressive atelectasis. New right lung patchy and nodular areas of consolidation, likely  infectious/inflammatory  -Pulm eval noted  -Med eval noted  -repeat echo reveals severe LV dysfunction    # acute on chronic systolic CHF   -diuretics as above  -repeat echo w severe lv dysfxn, unchanged from echo   -continue medical management of CMP/HF  -ecg, no ischemic changes, HS trop elevated secondary to hypoxia/ chf/ resp infection   -c/w bb     #Pafib, sp PPM     -c/w BB /ac       #mild as   -echo stable     #Abnl UA  -abx per med      35 minutes spent on total encounter; more than 50% of the visit was spent counseling and/or coordinating care by the attending physician.

## 2022-10-26 NOTE — SWALLOW BEDSIDE ASSESSMENT ADULT - COMMENTS
Thoracic Surgery Note 10/26/2022 -86 y/o Male w/ extensive pmhx admitted to [facility] with respiratory failure w/ hypoxia. On CT chest found to have b/l pleural effusions (s/p thoracentesis on 9/15 and 9/22) with L>R. Thoracic surgery consulted for drainage of effusion and PleurX placement. 10/26 - case cancelled d/t elevated sodium (153), and elevated INR (2.52).    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    CT Chest - Complete opacification of left lung secondary to pleural effusion and left main bronchial collapse with complete atelectasis. Prior studies dating back to 5/3/2022 demonstrate similar endobronchial secretions with impaired mucus clearance. Bronchoscopy can be performed to evaluate for endobronchial mass if deemed clinically necessary.Interval increase in right-sided pleural effusion from prior CT.Peripheral tree-in-bud nodular opacities in right upper and lower lobe secondary to mucoid impaction.    Of Note: Patient had an admission at [facility] 4/2022 and had FEES Test with Rx NPO with Goals of Care discussion. Family opted not to pursue PEG with understanding of Aspiration Pneumonia (See Notes for details).     Nurse reports Patient coughing when given PO meds with water. Clinical Swallow Evaluation requested.     Patient seen at bedside, alert and oriented, able to follow simple commands. Patient's daughter (Yael) is present. Daughter confirms Regular with Nectar Thick/Mildly Thick Liquids at [place]. Patient's daughter also confirms objective testing completed at [facility] in April 2022 and decline PEG placement and pursued an oral diet of Puree/Moderately Thick Liquids at that time. Thoracic Surgery Note 10/26/2022 -88 y/o Male w/ extensive pmhx admitted to [facility] with respiratory failure w/ hypoxia. On CT chest found to have b/l pleural effusions (s/p thoracentesis on 9/15 and 9/22) with L>R. Thoracic surgery consulted for drainage of effusion and PleurX placement. 10/26 - case cancelled d/t elevated sodium (153), and elevated INR (2.52).    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 w/ lethargy and sob.    CT Chest - Complete opacification of left lung secondary to pleural effusion and left main bronchial collapse with complete atelectasis. Prior studies dating back to 5/3/2022 demonstrate similar endobronchial secretions with impaired mucus clearance. Bronchoscopy can be performed to evaluate for endobronchial mass if deemed clinically necessary.Interval increase in right-sided pleural effusion from prior CT.Peripheral tree-in-bud nodular opacities in right upper and lower lobe secondary to mucoid impaction.    Of Note: Patient had an admission at [facility] 4/2022 and had Fiberoptic Endoscopic Eval Swallow FEES Test with Rx NPO with Goals of Care discussion. Family opted not to pursue PEG with understanding of Aspiration Pneumonia (See Notes for details).     Nurse reports Patient coughing when given PO meds with water. Clinical Swallow Evaluation requested.     Patient seen at bedside, alert and oriented, able to follow simple commands. Patient's daughter (Yael) is present. Daughter confirms Regular with Nectar Thick/Mildly Thick Liquids at [place]. Patient's daughter also confirms objective testing completed at [facility] in April 2022 and decline PEG placement and pursued an oral diet of Puree/Moderately Thick Liquids at that time.

## 2022-10-26 NOTE — PROGRESS NOTE ADULT - SUBJECTIVE AND OBJECTIVE BOX
Patient not optimized for OR today, case cancelled, OR aware.  Must optimize INR, Sodium, Potassium prior to OR, d/w ACP.  Will obtain consent via family.    Vital Signs:  Vital Signs Last 24 Hrs  T(C): 36.4 (10-26-22 @ 05:00), Max: 36.8 (10-25-22 @ 21:25)  T(F): 97.5 (10-26-22 @ 05:00), Max: 98.2 (10-25-22 @ 21:25)  HR: 67 (10-26-22 @ 09:26) (67 - 104)  BP: 139/60 (10-26-22 @ 05:00) (127/72 - 152/80)  RR: 18 (10-26-22 @ 05:00) (16 - 20)  SpO2: 98% (10-26-22 @ 09:26) (93% - 98%)     General: WN/WD NAD  Neurology: Awake,  Eyes: Scleras clear, PERRLA/ EOMI, Gross vision intact  ENT:Gross hearing intact, grossly patent pharynx, no stridor  Neck: Neck supple, trachea midline, No JVD,   CHEST/LUNG: Diminished breath sounds left lung field, crackles right lung field.  HEART: Regular rate and rhythm  ABDOMEN: Soft, Nontender, Nondistended; Bowel sounds present  EXTREMITIES:  2+ Peripheral Pulses, +lower extremity edema   PSYCH: AAOx1 to person.  NEUROLOGY: 5/5 RUE and LLE strength, 0/5 LUE strength, sensation grossly intact  SKIN: sacral ulcer, right BKA  Relevant labs, radiology and Medications reviewed                        8.0    7.61  )-----------( 222      ( 26 Oct 2022 05:15 )             28.3     10-26    153<H>  |  109<H>  |  44<H>  ----------------------------<  191<H>  3.2<L>   |  31  |  1.08    Ca    8.5      26 Oct 2022 05:15  Phos  3.2     10-26  Mg     1.90     10-26    TPro  5.7<L>  /  Alb  2.5<L>  /  TBili  0.5  /  DBili  x   /  AST  31  /  ALT  8   /  AlkPhos  90  10-25    PT/INR - ( 26 Oct 2022 05:15 )   PT: 29.5 sec;   INR: 2.52 ratio         PTT - ( 26 Oct 2022 05:15 )  PTT:34.1 sec  MEDICATIONS  (STANDING):  aspirin enteric coated 81 milliGRAM(s) Oral daily  atorvastatin 80 milliGRAM(s) Oral at bedtime  BACItracin   Ointment 1 Application(s) Topical daily  buDESOnide    Inhalation Suspension 0.5 milliGRAM(s) Inhalation every 12 hours  collagenase Ointment 1 Application(s) Topical daily  dextrose 5%. 1000 milliLiter(s) (50 mL/Hr) IV Continuous <Continuous>  finasteride 5 milliGRAM(s) Oral daily  furosemide   Injectable 40 milliGRAM(s) IV Push every 12 hours  levothyroxine Injectable 20 MICROGram(s) IV Push at bedtime  metoprolol tartrate 50 milliGRAM(s) Oral two times a day  montelukast 10 milliGRAM(s) Oral daily  pantoprazole  Injectable 40 milliGRAM(s) IV Push every 24 hours  polyethylene glycol 3350 17 Gram(s) Oral every 12 hours  potassium chloride   Powder 40 milliEquivalent(s) Oral every 4 hours  tamsulosin 0.4 milliGRAM(s) Oral at bedtime    MEDICATIONS  (PRN):  albuterol/ipratropium for Nebulization 3 milliLiter(s) Nebulizer every 6 hours PRN Shortness of Breath and/or Wheezing    Pertinent Physical Exam  I&O's Summary    25 Oct 2022 07:01  -  26 Oct 2022 07:00  --------------------------------------------------------  IN: 0 mL / OUT: 1700 mL / NET: -1700 mL        Assessment:  88 y/o Male w/ extensive pmhx admitted to [facility] with respiratory failure w/ hypoxia. On CT chest found to have b/l pleural effusions (s/p thoracentesis on 9/15 and 9/22) with L>R.  Thoracic surgery consulted for drainage of effusion and PleurX placement.  10/26 - case cancelled d/t elevated sodium (153), and elevated INR (2.52)    Plan:  -Case today (10/26) cancelled.  -discussed correcting sodium, potassium, INR in order for OR optimization with primary team.  -Plan for OR tomorrow (10/27) or Friday (10/28).  -Remainder of care per primary team  -Daily CXRs  -Must repeat labs for OR.  -NPO at midnight. Patient not optimized for OR today, case cancelled, OR aware.  Must optimize INR, Sodium, Potassium prior to OR, d/w ACP.  Will obtain consent via family.    Vital Signs:  Vital Signs Last 24 Hrs  T(C): 36.4 (10-26-22 @ 05:00), Max: 36.8 (10-25-22 @ 21:25)  T(F): 97.5 (10-26-22 @ 05:00), Max: 98.2 (10-25-22 @ 21:25)  HR: 67 (10-26-22 @ 09:26) (67 - 104)  BP: 139/60 (10-26-22 @ 05:00) (127/72 - 152/80)  RR: 18 (10-26-22 @ 05:00) (16 - 20)  SpO2: 98% (10-26-22 @ 09:26) (93% - 98%)     General: WN/WD NAD  Neurology: Awake,  Eyes: Scleras clear, PERRLA/ EOMI, Gross vision intact  ENT:Gross hearing intact, grossly patent pharynx, no stridor  Neck: Neck supple, trachea midline, No JVD,   CHEST/LUNG: Diminished breath sounds left lung field, crackles right lung field.  HEART: Regular rate and rhythm  ABDOMEN: Soft, Nontender, Nondistended; Bowel sounds present  EXTREMITIES:  2+ Peripheral Pulses, +lower extremity edema   PSYCH: AAOx1 to person.  NEUROLOGY: 5/5 RUE and LLE strength, 0/5 LUE strength, sensation grossly intact  SKIN: sacral ulcer, right BKA  Relevant labs, radiology and Medications reviewed                        8.0    7.61  )-----------( 222      ( 26 Oct 2022 05:15 )             28.3     10-26    153<H>  |  109<H>  |  44<H>  ----------------------------<  191<H>  3.2<L>   |  31  |  1.08    Ca    8.5      26 Oct 2022 05:15  Phos  3.2     10-26  Mg     1.90     10-26    TPro  5.7<L>  /  Alb  2.5<L>  /  TBili  0.5  /  DBili  x   /  AST  31  /  ALT  8   /  AlkPhos  90  10-25    PT/INR - ( 26 Oct 2022 05:15 )   PT: 29.5 sec;   INR: 2.52 ratio         PTT - ( 26 Oct 2022 05:15 )  PTT:34.1 sec  MEDICATIONS  (STANDING):  aspirin enteric coated 81 milliGRAM(s) Oral daily  atorvastatin 80 milliGRAM(s) Oral at bedtime  BACItracin   Ointment 1 Application(s) Topical daily  buDESOnide    Inhalation Suspension 0.5 milliGRAM(s) Inhalation every 12 hours  collagenase Ointment 1 Application(s) Topical daily  dextrose 5%. 1000 milliLiter(s) (50 mL/Hr) IV Continuous <Continuous>  finasteride 5 milliGRAM(s) Oral daily  furosemide   Injectable 40 milliGRAM(s) IV Push every 12 hours  levothyroxine Injectable 20 MICROGram(s) IV Push at bedtime  metoprolol tartrate 50 milliGRAM(s) Oral two times a day  montelukast 10 milliGRAM(s) Oral daily  pantoprazole  Injectable 40 milliGRAM(s) IV Push every 24 hours  polyethylene glycol 3350 17 Gram(s) Oral every 12 hours  potassium chloride   Powder 40 milliEquivalent(s) Oral every 4 hours  tamsulosin 0.4 milliGRAM(s) Oral at bedtime    MEDICATIONS  (PRN):  albuterol/ipratropium for Nebulization 3 milliLiter(s) Nebulizer every 6 hours PRN Shortness of Breath and/or Wheezing    Pertinent Physical Exam  I&O's Summary    25 Oct 2022 07:01  -  26 Oct 2022 07:00  --------------------------------------------------------  IN: 0 mL / OUT: 1700 mL / NET: -1700 mL        Assessment:  88 y/o Male w/ extensive pmhx admitted to [facility] with respiratory failure w/ hypoxia. On CT chest found to have b/l pleural effusions (s/p thoracentesis on 9/15 and 9/22) with L>R.  Thoracic surgery consulted for drainage of effusion and PleurX placement.  10/26 - case cancelled d/t elevated sodium (153), and elevated INR (2.52)    Plan:  -Case today (10/26) cancelled.  -Discussed correcting sodium, potassium, INR in order for OR optimization with primary team.  -Plan for OR tomorrow (10/27) or Friday (10/28).  -Daily CXRs  -Must repeat labs for OR (Coags, CBC, CMP)  -NPO at midnight.  -Remainder of care per primary team

## 2022-10-26 NOTE — PROGRESS NOTE ADULT - PROBLEM SELECTOR PLAN 1
events noted: seen by thoracic surgery  : was supposed to get leurl effusion drained today  : but labs needed to be optimized:  now due for tomorrow: he recently had thoracenteses  done on both sides at [facility]:  has poor LV function:  currently on diuresis

## 2022-10-26 NOTE — CONSULT NOTE ADULT - SUBJECTIVE AND OBJECTIVE BOX
[facility] [place] [place] [facility] - ORESTES Wall / ORESTES Wynne / KARIE Melendez/ ORESTES Knight/ ORESTES Jang/ ADRIA Lee / TOÑITO Mora / ROBB Kapoor  -------------------------------------------------------------------------------------------------------  The patient seen and examined today.  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 w/ lethargy and sob. He was having increased work of breathing at the center. When he came to the ED he was a/o times 3. He was placed on bipap for work of breathing. MICU and cardiology was consulted, given troponin elevation of 280 (is around his baseline from last admission). Collateral obtained per daughters given patient is confused, states no bleeding or melena, and that he had a drop in his hemoglobin around this time. His vbg shows CO2 of 65, w/ compensation of pH. Per daughters, no episodes of fevers, diarrhea, sob, headaches, or falls.     EKG: ventricular paced rhythm, no acute ST elevations.  Chest xray w/ large left pleural effusion and pulm vascular congestion   (25 Oct 2022 01:47)      PAST MEDICAL & SURGICAL HISTORY:  Diabetes Mellitus      Hypertension      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 in 17 yeras ago      Chronic Obstructive Pulmonary Disease (COPD)      Obstructive Sleep Apnea      Mycobacterium Avium-Intracellulare Infection  2009      Deep Vein Thrombosis (DVT)  17 yeras ago on Coumadin      CHF (congestive heart failure)  last exacerbation in 2017      Enlarged prostate      GERD (gastroesophageal reflux disease)      Hernia  umblical      Calculus of bile duct without cholangitis or cholecystitis without obstruction      Atrial fibrillation      COPD, severe      Chronic systolic congestive heart failure      H/O sick sinus syndrome      Cerebrovascular accident (CVA)      Hypothyroid      BPH (benign prostatic hyperplasia)      Chronic atrial fibrillation      S/P Hernia Repair      S/P cataract surgery, unspecified laterality      S/P ERCP  3/2017      S/P ERCP      H/O hernia repair        Allergies :- No Known Allergies    Home Medications Reviewed  Hospital Medications:   MEDICATIONS  (STANDING):  aspirin enteric coated 81 milliGRAM(s) Oral daily  atorvastatin 80 milliGRAM(s) Oral at bedtime  BACItracin   Ointment 1 Application(s) Topical daily  buDESOnide    Inhalation Suspension 0.5 milliGRAM(s) Inhalation every 12 hours  collagenase Ointment 1 Application(s) Topical daily  dextrose 5%. 1000 milliLiter(s) (50 mL/Hr) IV Continuous <Continuous>  finasteride 5 milliGRAM(s) Oral daily  furosemide   Injectable 40 milliGRAM(s) IV Push every 12 hours  levothyroxine Injectable 20 MICROGram(s) IV Push at bedtime  metoprolol tartrate 50 milliGRAM(s) Oral two times a day  montelukast 10 milliGRAM(s) Oral daily  pantoprazole  Injectable 40 milliGRAM(s) IV Push every 24 hours  polyethylene glycol 3350 17 Gram(s) Oral every 12 hours  potassium chloride   Powder 40 milliEquivalent(s) Oral every 4 hours  tamsulosin 0.4 milliGRAM(s) Oral at bedtime    SOCIAL HISTORY:  Denies ETOh,Smoking,   FAMILY HISTORY:  FH: HTN (hypertension) (Father)        REVIEW OF SYSTEMS:  CONSTITUTIONAL: Lethargy +  EYES/ENT: No visual changes;  No vertigo or throat pain   NECK: No pain or stiffness  RESPIRATORY: SOB +  CARDIOVASCULAR: SOB +  GASTROINTESTINAL: No abdominal or epigastric pain. No nausea, vomiting, or hematemesis; No diarrhea or constipation. No melena or hematochezia.  GENITOURINARY: No dysuria, frequency, foamy urine, urinary urgency, incontinence or hematuria  NEUROLOGICAL: Lethargy +  SKIN: No itching, burning, rashes, or lesions   VASCULAR: No bilateral lower extremity edema.   All other review of systems is negative unless indicated above.    VITALS:  T(F): 97.5 (10-26-22 @ 05:00), Max: 98.2 (10-25-22 @ 21:25)  HR: 67 (10-26-22 @ 09:26)  BP: 139/60 (10-26-22 @ 05:00)  RR: 18 (10-26-22 @ 05:00)  SpO2: 98% (10-26-22 @ 09:26)  Wt(kg): --    10-25 @ 07:01  -  10-26 @ 07:00  --------------------------------------------------------  IN: 0 mL / OUT: 1700 mL / NET: -1700 mL    10-26 @ 07:01  -  10-26 @ 10:35  --------------------------------------------------------  IN: 0 mL / OUT: 900 mL / NET: -900 mL        Weight (kg): 93 (10-25 @ 22:20)    PHYSICAL EXAM:  Constitutional: NAD  HEENT: anicteric sclera, oropharynx clear, MMM  Neck: supple.   Respiratory: coarse breath sounds B/L  Cardiovascular: S1, S2, Regular, Murmur present.  Gastrointestinal: Bowel Sound present, soft, NT/ND  Extremities: B/L BKA  Neurological: Alert and oriented   Psychiatric: Normal mood, normal affect  : No CVA tenderness. No hudson.   Skin: No rashes    Data:  10-26    153<H>  |  109<H>  |  44<H>  ----------------------------<  191<H>  3.2<L>   |  31  |  1.08    Ca    8.5      26 Oct 2022 05:15  Phos  3.2     10-26  Mg     1.90     10-26    TPro  5.7<L>  /  Alb  2.5<L>  /  TBili  0.5  /  DBili      /  AST  31  /  ALT  8   /  AlkPhos  90  10-25    Creatinine Trend: 1.08 <--, 0.96 <--, 0.98 <--                        8.0    7.61  )-----------( 222      ( 26 Oct 2022 05:15 )             28.3     Urine Studies:  Urinalysis Basic - ( 25 Oct 2022 07:30 )    Color: Light Yellow / Appearance: Slightly Turbid / S.012 / pH:   Gluc:  / Ketone: Trace  / Bili: Negative / Urobili: <2 mg/dL   Blood:  / Protein: Trace / Nitrite: Negative   Leuk Esterase: Large / RBC: 2 /HPF / WBC 99 /HPF   Sq Epi:  / Non Sq Epi: 1 /HPF / Bacteria: Many

## 2022-10-27 NOTE — DIETITIAN INITIAL EVALUATION ADULT - PROBLEM SELECTOR PROBLEM 4
Mary Murphy continues to have mild ABBY with an AHI of 12. Her current CPAP machine is nonfunctional  I think optimal plan is to order an auto titrating CPAP  Please call her and ask her preferred DME company? Pleural effusion

## 2022-10-27 NOTE — PROGRESS NOTE ADULT - ASSESSMENT
TTE with Doppler (w/Cont) (04.03.22 @ 15:07) >  mild aortic stenosis. . Mild left ventricular systolic dysfunction. The inferior wall, and the inferoseptum are hypokinetic.  Echo 9/15/22: .EF 35-40% Severe segmental left ventricular systolic dysfunction. The mid to distal anteroseptum and severely hypokinetic. The mid to basal inferolateral wall is hypokinetic.      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 New right lung patchy and nodular areas of consolidation, likely  infectious/inflammatory  -Pulm/Med/thoracic  f/u  -recent echo with .EF 35-40% Severe segmental left ventricular systolic dysfunction. The mid to distal anteroseptum and severely hypokinetic. The mid to basal inferolateral wall is hypokinetic.  -await OR with thoracic surgery next week     # acute on chronic systolic CHF   -stalbe, cont diuretics   -repeat echo with moderate severe lv dysfxn, unchanged from echo  4/2022  -continue medical management of CMP/HF  -ecg, no ischemic changes, HS trop elevated secondary to hypoxia/ chf/ resp infection   -c/w bb     #Pafib, sp PPM   -c/w BB /ac     #mild as   -echo stable     #Abnl UA  -abx per med      #hypernatremia   -free water per renal     35 minutes spent on total encounter; more than 50% of the visit was spent counseling and/or coordinating care by the attending physician.

## 2022-10-27 NOTE — PROGRESS NOTE ADULT - SUBJECTIVE AND OBJECTIVE BOX
Subjective:    Vital Signs:  Vital Signs Last 24 Hrs  T(C): 36.7 (10-27-22 @ 05:32), Max: 36.8 (10-26-22 @ 12:16)  T(F): 98.1 (10-27-22 @ 05:32), Max: 98.3 (10-26-22 @ 12:16)  HR: 76 (10-27-22 @ 08:22) (67 - 99)  BP: 134/82 (10-27-22 @ 05:32) (123/55 - 141/56)  RR: 18 (10-27-22 @ 05:32) (18 - 19)  SpO2: 98% (10-27-22 @ 08:22) (90% - 99%)     General: Appears stated age, NAD  Neurology: Awake  Eyes: Scleras clear, EOMI, Gross vision intact  ENT: Gross hearing intact, grossly patent pharynx, no stridor  Neck: Neck supple, trachea midline  CHEST/LUNG: Diminished breath sounds left lung field, crackles right lung field.  HEART: Regular rate and rhythm  ABDOMEN: Soft, Nontender, Nondistended  EXTREMITIES:  2+ Peripheral Pulses, +lower extremity edema   PSYCH: AAOx1 to person.  NEUROLOGY: 5/5 RUE and LLE strength, 0/5 LUE strength, sensation grossly intact  SKIN: sacral ulcer, right BKA  Relevant labs, radiology and Medications reviewed                          8.0    7.61  )-----------( 222      ( 26 Oct 2022 05:15 )             28.3     10-27    154<H>  |  110<H>  |  36<H>  ----------------------------<  252<H>  3.7   |  33<H>  |  1.06    Ca    8.4      27 Oct 2022 06:30  Phos  2.4     10-27  Mg     1.80     10-27      PT/INR - ( 27 Oct 2022 06:30 )   PT: 29.6 sec;   INR: 2.53 ratio         PTT - ( 27 Oct 2022 06:30 )  PTT:34.2 sec  MEDICATIONS  (STANDING):  aspirin enteric coated 81 milliGRAM(s) Oral daily  atorvastatin 80 milliGRAM(s) Oral at bedtime  BACItracin   Ointment 1 Application(s) Topical daily  buDESOnide    Inhalation Suspension 0.5 milliGRAM(s) Inhalation every 12 hours  collagenase Ointment 1 Application(s) Topical daily  dextrose 5%. 1000 milliLiter(s) (50 mL/Hr) IV Continuous <Continuous>  dextrose 5%. 1000 milliLiter(s) (75 mL/Hr) IV Continuous <Continuous>  dextrose 5%. 1000 milliLiter(s) (100 mL/Hr) IV Continuous <Continuous>  dextrose 50% Injectable 25 Gram(s) IV Push once  dextrose 50% Injectable 12.5 Gram(s) IV Push once  dextrose 50% Injectable 25 Gram(s) IV Push once  finasteride 5 milliGRAM(s) Oral daily  furosemide   Injectable 40 milliGRAM(s) IV Push every 12 hours  glucagon  Injectable 1 milliGRAM(s) IntraMuscular once  insulin glargine Injectable (LANTUS) 10 Unit(s) SubCutaneous at bedtime  insulin lispro (ADMELOG) corrective regimen sliding scale   SubCutaneous three times a day before meals  insulin lispro (ADMELOG) corrective regimen sliding scale   SubCutaneous at bedtime  insulin lispro Injectable (ADMELOG) 6 Unit(s) SubCutaneous three times a day before meals  levothyroxine Injectable 20 MICROGram(s) IV Push at bedtime  metoprolol tartrate 50 milliGRAM(s) Oral two times a day  montelukast 10 milliGRAM(s) Oral daily  pantoprazole  Injectable 40 milliGRAM(s) IV Push every 24 hours  polyethylene glycol 3350 17 Gram(s) Oral every 12 hours  tamsulosin 0.4 milliGRAM(s) Oral at bedtime    MEDICATIONS  (PRN):  acetaminophen     Tablet .. 650 milliGRAM(s) Oral every 6 hours PRN Mild Pain (1 - 3), Moderate Pain (4 - 6)  albuterol/ipratropium for Nebulization 3 milliLiter(s) Nebulizer every 6 hours PRN Shortness of Breath and/or Wheezing  dextrose Oral Gel 15 Gram(s) Oral once PRN Blood Glucose LESS THAN 70 milliGRAM(s)/deciliter    Pertinent Physical Exam  I&O's Summary    26 Oct 2022 07:01  -  27 Oct 2022 07:00  --------------------------------------------------------  IN: 0 mL / OUT: 2900 mL / NET: -2900 mL        Assessment  88 y/o Male w/ extensive pmhx admitted to [facility] with respiratory failure w/ hypoxia. On CT chest found to have b/l pleural effusions (s/p thoracentesis on 9/15 and 9/22) with L>R.  Thoracic surgery consulted for drainage of effusion and PleurX placement.  10/26 - case cancelled d/t elevated sodium (153), and elevated INR (2.52)  10/27 - Na (153), INR (2.5)    Plan:  -Case booked for Monday (10/31)  -Discussed correcting sodium, potassium, INR in order for OR optimization with primary team.  -Plan for OR tomorrow (10/27) or Friday (10/28).  -Daily CXRs  -Must repeat labs for OR (Coags, CBC, CMP)  -NPO at midnight.  -Remainder of care per primary team   Patient seen at bedside by Thoracic.  Pt not optimized for OR yet. Still with elevated Sodium, INR, and low potassium. Will need medical optimization prior to OR.  Planning for OR Monday pending optimization.      Vital Signs:  Vital Signs Last 24 Hrs  T(C): 36.7 (10-27-22 @ 05:32), Max: 36.8 (10-26-22 @ 12:16)  T(F): 98.1 (10-27-22 @ 05:32), Max: 98.3 (10-26-22 @ 12:16)  HR: 76 (10-27-22 @ 08:22) (67 - 99)  BP: 134/82 (10-27-22 @ 05:32) (123/55 - 141/56)  RR: 18 (10-27-22 @ 05:32) (18 - 19)  SpO2: 98% (10-27-22 @ 08:22) (90% - 99%)     General: Appears stated age, NAD  Neurology: Awake  CHEST/LUNG: Diminished breath sounds left lung field, crackles right lung field.  HEART: Regular rate and rhythm  ABDOMEN: Soft, Nontender, Nondistended  EXTREMITIES: +lower extremity edema   PSYCH: AAOx1 to person.  SKIN: sacral ulcer, right BKA  Relevant labs, radiology and Medications reviewed                          8.0    7.61  )-----------( 222      ( 26 Oct 2022 05:15 )             28.3     10-27    154<H>  |  110<H>  |  36<H>  ----------------------------<  252<H>  3.7   |  33<H>  |  1.06    Ca    8.4      27 Oct 2022 06:30  Phos  2.4     10-27  Mg     1.80     10-27      PT/INR - ( 27 Oct 2022 06:30 )   PT: 29.6 sec;   INR: 2.53 ratio         PTT - ( 27 Oct 2022 06:30 )  PTT:34.2 sec  MEDICATIONS  (STANDING):  aspirin enteric coated 81 milliGRAM(s) Oral daily  atorvastatin 80 milliGRAM(s) Oral at bedtime  BACItracin   Ointment 1 Application(s) Topical daily  buDESOnide    Inhalation Suspension 0.5 milliGRAM(s) Inhalation every 12 hours  collagenase Ointment 1 Application(s) Topical daily  dextrose 5%. 1000 milliLiter(s) (50 mL/Hr) IV Continuous <Continuous>  dextrose 5%. 1000 milliLiter(s) (75 mL/Hr) IV Continuous <Continuous>  dextrose 5%. 1000 milliLiter(s) (100 mL/Hr) IV Continuous <Continuous>  dextrose 50% Injectable 25 Gram(s) IV Push once  dextrose 50% Injectable 12.5 Gram(s) IV Push once  dextrose 50% Injectable 25 Gram(s) IV Push once  finasteride 5 milliGRAM(s) Oral daily  furosemide   Injectable 40 milliGRAM(s) IV Push every 12 hours  glucagon  Injectable 1 milliGRAM(s) IntraMuscular once  insulin glargine Injectable (LANTUS) 10 Unit(s) SubCutaneous at bedtime  insulin lispro (ADMELOG) corrective regimen sliding scale   SubCutaneous three times a day before meals  insulin lispro (ADMELOG) corrective regimen sliding scale   SubCutaneous at bedtime  insulin lispro Injectable (ADMELOG) 6 Unit(s) SubCutaneous three times a day before meals  levothyroxine Injectable 20 MICROGram(s) IV Push at bedtime  metoprolol tartrate 50 milliGRAM(s) Oral two times a day  montelukast 10 milliGRAM(s) Oral daily  pantoprazole  Injectable 40 milliGRAM(s) IV Push every 24 hours  polyethylene glycol 3350 17 Gram(s) Oral every 12 hours  tamsulosin 0.4 milliGRAM(s) Oral at bedtime    MEDICATIONS  (PRN):  acetaminophen     Tablet .. 650 milliGRAM(s) Oral every 6 hours PRN Mild Pain (1 - 3), Moderate Pain (4 - 6)  albuterol/ipratropium for Nebulization 3 milliLiter(s) Nebulizer every 6 hours PRN Shortness of Breath and/or Wheezing  dextrose Oral Gel 15 Gram(s) Oral once PRN Blood Glucose LESS THAN 70 milliGRAM(s)/deciliter    Pertinent Physical Exam  I&O's Summary    26 Oct 2022 07:01  -  27 Oct 2022 07:00  --------------------------------------------------------  IN: 0 mL / OUT: 2900 mL / NET: -2900 mL        Assessment  86 y/o Male w/ extensive pmhx admitted to [facility] with respiratory failure w/ hypoxia. On CT chest found to have b/l pleural effusions (s/p thoracentesis on 9/15 and 9/22) with L>R.  Thoracic surgery consulted for drainage of effusion and PleurX placement.  10/26 - case cancelled d/t elevated sodium (153), and elevated INR (2.52)  10/27 - Na (153), INR (2.5)    Plan:  -Case booked for Monday (10/31)  -Must correct sodium, potassium, INR in order for OR optimization. Primary team aware.  -Plan for OR Monday (10/31)  -Daily CXRs  -Must be optimized for OR, will need labs (Coags, CBC, CMP, active T&S, active Covid)  -Remainder of care per primary team   Patient seen at bedside by Thoracic. Only complaint of foot pain.  Pt not optimized for OR yet. Still with elevated Sodium, INR, and low potassium. Will need medical optimization prior to OR.  Planning for OR Monday pending optimization.      Vital Signs:  Vital Signs Last 24 Hrs  T(C): 36.7 (10-27-22 @ 05:32), Max: 36.8 (10-26-22 @ 12:16)  T(F): 98.1 (10-27-22 @ 05:32), Max: 98.3 (10-26-22 @ 12:16)  HR: 76 (10-27-22 @ 08:22) (67 - 99)  BP: 134/82 (10-27-22 @ 05:32) (123/55 - 141/56)  RR: 18 (10-27-22 @ 05:32) (18 - 19)  SpO2: 98% (10-27-22 @ 08:22) (90% - 99%)     General: Appears stated age, NAD  Neurology: Awake  CHEST/LUNG: Diminished breath sounds left lung field, crackles right lung field.  HEART: Regular rate and rhythm  ABDOMEN: Soft, Nontender, Nondistended  EXTREMITIES: +lower extremity edema   PSYCH: AAOx1 to person.  SKIN: sacral ulcer, right BKA  Relevant labs, radiology and Medications reviewed                          8.0    7.61  )-----------( 222      ( 26 Oct 2022 05:15 )             28.3     10-27    154<H>  |  110<H>  |  36<H>  ----------------------------<  252<H>  3.7   |  33<H>  |  1.06    Ca    8.4      27 Oct 2022 06:30  Phos  2.4     10-27  Mg     1.80     10-27      PT/INR - ( 27 Oct 2022 06:30 )   PT: 29.6 sec;   INR: 2.53 ratio         PTT - ( 27 Oct 2022 06:30 )  PTT:34.2 sec  MEDICATIONS  (STANDING):  aspirin enteric coated 81 milliGRAM(s) Oral daily  atorvastatin 80 milliGRAM(s) Oral at bedtime  BACItracin   Ointment 1 Application(s) Topical daily  buDESOnide    Inhalation Suspension 0.5 milliGRAM(s) Inhalation every 12 hours  collagenase Ointment 1 Application(s) Topical daily  dextrose 5%. 1000 milliLiter(s) (50 mL/Hr) IV Continuous <Continuous>  dextrose 5%. 1000 milliLiter(s) (75 mL/Hr) IV Continuous <Continuous>  dextrose 5%. 1000 milliLiter(s) (100 mL/Hr) IV Continuous <Continuous>  dextrose 50% Injectable 25 Gram(s) IV Push once  dextrose 50% Injectable 12.5 Gram(s) IV Push once  dextrose 50% Injectable 25 Gram(s) IV Push once  finasteride 5 milliGRAM(s) Oral daily  furosemide   Injectable 40 milliGRAM(s) IV Push every 12 hours  glucagon  Injectable 1 milliGRAM(s) IntraMuscular once  insulin glargine Injectable (LANTUS) 10 Unit(s) SubCutaneous at bedtime  insulin lispro (ADMELOG) corrective regimen sliding scale   SubCutaneous three times a day before meals  insulin lispro (ADMELOG) corrective regimen sliding scale   SubCutaneous at bedtime  insulin lispro Injectable (ADMELOG) 6 Unit(s) SubCutaneous three times a day before meals  levothyroxine Injectable 20 MICROGram(s) IV Push at bedtime  metoprolol tartrate 50 milliGRAM(s) Oral two times a day  montelukast 10 milliGRAM(s) Oral daily  pantoprazole  Injectable 40 milliGRAM(s) IV Push every 24 hours  polyethylene glycol 3350 17 Gram(s) Oral every 12 hours  tamsulosin 0.4 milliGRAM(s) Oral at bedtime    MEDICATIONS  (PRN):  acetaminophen     Tablet .. 650 milliGRAM(s) Oral every 6 hours PRN Mild Pain (1 - 3), Moderate Pain (4 - 6)  albuterol/ipratropium for Nebulization 3 milliLiter(s) Nebulizer every 6 hours PRN Shortness of Breath and/or Wheezing  dextrose Oral Gel 15 Gram(s) Oral once PRN Blood Glucose LESS THAN 70 milliGRAM(s)/deciliter    Pertinent Physical Exam  I&O's Summary    26 Oct 2022 07:01  -  27 Oct 2022 07:00  --------------------------------------------------------  IN: 0 mL / OUT: 2900 mL / NET: -2900 mL        Assessment  86 y/o Male w/ extensive pmhx admitted to [facility] with respiratory failure w/ hypoxia. On CT chest found to have b/l pleural effusions (s/p thoracentesis on 9/15 and 9/22) with L>R.  Thoracic surgery consulted for drainage of effusion and PleurX placement.  10/26 - case cancelled d/t elevated sodium (153), and elevated INR (2.52)  10/27 - Na (153), INR (2.5)    Plan:  -Case booked for Monday (10/31)  -Must correct sodium, potassium, INR in order for OR optimization. Primary team aware.  -Daily CXRs  -Must be optimized for OR, will need labs (Coags, CBC, CMP, active T&S, active Covid)  -Remainder of care per primary team

## 2022-10-27 NOTE — PROGRESS NOTE ADULT - ASSESSMENT
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     Hypernatremia  The patient appears hypervolemic  Agreed with Lasix 40mg IV BID  Agreed with IV D5W with continual diuresis to promote natreuresis  If hypernatremia is holding up patient for an important procedure, can increase D5W to 100cc/hr for 24hrs to improve number  BMP q12hrs for now    B/L Pleural effusions + Left lung collapse  Per thoracic surgery      For any question, call:  Cell # [phone]  Pager # [phone]  Callback # [phone]

## 2022-10-27 NOTE — DIETITIAN INITIAL EVALUATION ADULT - PERTINENT MEDS FT
MEDICATIONS  (STANDING):  aspirin enteric coated 81 milliGRAM(s) Oral daily  atorvastatin 80 milliGRAM(s) Oral at bedtime  BACItracin   Ointment 1 Application(s) Topical daily  buDESOnide    Inhalation Suspension 0.5 milliGRAM(s) Inhalation every 12 hours  collagenase Ointment 1 Application(s) Topical daily  dextrose 5%. 1000 milliLiter(s) (50 mL/Hr) IV Continuous <Continuous>  dextrose 5%. 1000 milliLiter(s) (75 mL/Hr) IV Continuous <Continuous>  dextrose 5%. 1000 milliLiter(s) (100 mL/Hr) IV Continuous <Continuous>  dextrose 50% Injectable 25 Gram(s) IV Push once  dextrose 50% Injectable 12.5 Gram(s) IV Push once  dextrose 50% Injectable 25 Gram(s) IV Push once  finasteride 5 milliGRAM(s) Oral daily  furosemide   Injectable 40 milliGRAM(s) IV Push every 12 hours  glucagon  Injectable 1 milliGRAM(s) IntraMuscular once  insulin glargine Injectable (LANTUS) 10 Unit(s) SubCutaneous at bedtime  insulin lispro (ADMELOG) corrective regimen sliding scale   SubCutaneous three times a day before meals  insulin lispro (ADMELOG) corrective regimen sliding scale   SubCutaneous at bedtime  insulin lispro Injectable (ADMELOG) 6 Unit(s) SubCutaneous three times a day before meals  levothyroxine Injectable 20 MICROGram(s) IV Push at bedtime  metoprolol tartrate 50 milliGRAM(s) Oral two times a day  montelukast 10 milliGRAM(s) Oral daily  pantoprazole  Injectable 40 milliGRAM(s) IV Push every 24 hours  polyethylene glycol 3350 17 Gram(s) Oral every 12 hours  tamsulosin 0.4 milliGRAM(s) Oral at bedtime    MEDICATIONS  (PRN):  acetaminophen     Tablet .. 650 milliGRAM(s) Oral every 6 hours PRN Mild Pain (1 - 3), Moderate Pain (4 - 6)  albuterol/ipratropium for Nebulization 3 milliLiter(s) Nebulizer every 6 hours PRN Shortness of Breath and/or Wheezing  dextrose Oral Gel 15 Gram(s) Oral once PRN Blood Glucose LESS THAN 70 milliGRAM(s)/deciliter

## 2022-10-27 NOTE — DIETITIAN INITIAL EVALUATION ADULT - OTHER INFO
Pt seen sleeping at bedside, observed breakfast tray at bedside ~25% consumed. As per SLP recommendation (10/26/22)- Puree with moderately thick liquids. Noted skin wound cervical spine, Lt upper posterior arm, Stage 1 Posterior ear, stage 1 Lt buttocks per nursing flow sheet.  88 yo male pmh BPH, PAD s/p R BKA, COPD (not on home O2), HTN, HLD, afib, PPM ([employer])  CVA w/ residual L hemiparesis on coumadin, insulin-dependent diabetes, sys CHF on lasix presents with shortness of breath.    Pt seen sleeping at bedside, observed breakfast tray at bedside ~25% consumed. As per SLP recommendation (10/26/22)- Puree with moderately thick liquids. Noted skin wound cervical spine, Lt upper posterior arm, Stage 1 Posterior ear, stage 1 Lt buttocks per nursing flow sheet. Labs reviewed. A1c- 9.1%

## 2022-10-27 NOTE — PROGRESS NOTE ADULT - PROBLEM SELECTOR PLAN 1
events noted: seen by thoracic surgery  : was supposed to get leurl effusion drained today  : but labs needed to be optimized:  now due for tomorrow: he recently had thoracenteses  done on both sides at [facility]:  has poor LV function:  currently on diuresis    10/27: diuresis being done : no chest tube done as he is not optimized for the OR yet:  appreciate thoracic consult:  cont diuresis for now:  chest xray today seems  with better oxygenation

## 2022-10-27 NOTE — PROGRESS NOTE ADULT - SUBJECTIVE AND OBJECTIVE BOX
Date of Service: 10-27-22 @ 14:29    Patient is a 87y old  Male who presents with a chief complaint of Acute hypoxic respiratory failure (27 Oct 2022 13:12)      Any change in ROS: seems comfortable :no sob:  no apparent resp distress     MEDICATIONS  (STANDING):  aspirin enteric coated 81 milliGRAM(s) Oral daily  atorvastatin 80 milliGRAM(s) Oral at bedtime  BACItracin   Ointment 1 Application(s) Topical daily  buDESOnide    Inhalation Suspension 0.5 milliGRAM(s) Inhalation every 12 hours  collagenase Ointment 1 Application(s) Topical daily  dextrose 5%. 1000 milliLiter(s) (50 mL/Hr) IV Continuous <Continuous>  dextrose 5%. 1000 milliLiter(s) (75 mL/Hr) IV Continuous <Continuous>  dextrose 5%. 1000 milliLiter(s) (100 mL/Hr) IV Continuous <Continuous>  dextrose 50% Injectable 25 Gram(s) IV Push once  dextrose 50% Injectable 12.5 Gram(s) IV Push once  dextrose 50% Injectable 25 Gram(s) IV Push once  finasteride 5 milliGRAM(s) Oral daily  furosemide   Injectable 40 milliGRAM(s) IV Push every 12 hours  glucagon  Injectable 1 milliGRAM(s) IntraMuscular once  insulin glargine Injectable (LANTUS) 10 Unit(s) SubCutaneous at bedtime  insulin lispro (ADMELOG) corrective regimen sliding scale   SubCutaneous three times a day before meals  insulin lispro (ADMELOG) corrective regimen sliding scale   SubCutaneous at bedtime  insulin lispro Injectable (ADMELOG) 6 Unit(s) SubCutaneous three times a day before meals  levothyroxine Injectable 20 MICROGram(s) IV Push at bedtime  metoprolol tartrate 50 milliGRAM(s) Oral two times a day  montelukast 10 milliGRAM(s) Oral daily  pantoprazole  Injectable 40 milliGRAM(s) IV Push every 24 hours  polyethylene glycol 3350 17 Gram(s) Oral every 12 hours  tamsulosin 0.4 milliGRAM(s) Oral at bedtime    MEDICATIONS  (PRN):  acetaminophen     Tablet .. 650 milliGRAM(s) Oral every 6 hours PRN Mild Pain (1 - 3), Moderate Pain (4 - 6)  albuterol/ipratropium for Nebulization 3 milliLiter(s) Nebulizer every 6 hours PRN Shortness of Breath and/or Wheezing  dextrose Oral Gel 15 Gram(s) Oral once PRN Blood Glucose LESS THAN 70 milliGRAM(s)/deciliter    Vital Signs Last 24 Hrs  T(C): 36.7 (27 Oct 2022 13:00), Max: 36.8 (26 Oct 2022 17:30)  T(F): 98 (27 Oct 2022 13:00), Max: 98.2 (26 Oct 2022 17:30)  HR: 78 (27 Oct 2022 13:00) (67 - 99)  BP: 126/68 (27 Oct 2022 13:00) (123/55 - 141/56)  BP(mean): --  RR: 18 (27 Oct 2022 13:00) (18 - 19)  SpO2: 92% (27 Oct 2022 13:00) (90% - 99%)    Parameters below as of 27 Oct 2022 13:00  Patient On (Oxygen Delivery Method): nasal cannula  O2 Flow (L/min): 4      I&O's Summary    26 Oct 2022 07:01  -  27 Oct 2022 07:00  --------------------------------------------------------  IN: 0 mL / OUT: 2900 mL / NET: -2900 mL          Physical Exam:   GENERAL: NAD, well-groomed, well-developed  HEENT: PETRONA/   Atraumatic, Normocephalic  ENMT: No tonsillar erythema, exudates, or enlargement; Moist mucous membranes, Good dentition, No lesions  NECK: Supple, No JVD, Normal thyroid  CHEST/LUNG: bibasilar crackles : nowheezing  CVS: Regular rate and rhythm; No murmurs, rubs, or gallops  GI: : Soft, Nontender, Nondistended; Bowel sounds present  NERVOUS SYSTEM:  Alert & awake  EXTREMITIES:  + edema  LYMPH: No lymphadenopathy noted  SKIN: No rashes or lesions  ENDOCRINOLOGY: No Thyromegaly  PSYCH: Appropriate    Labs:  36, 39                            8.0    7.61  )-----------( 222      ( 26 Oct 2022 05:15 )             28.3                         8.4    7.85  )-----------( 238      ( 25 Oct 2022 06:49 )             30.4                         8.2    8.67  )-----------( 267      ( 24 Oct 2022 21:30 )             30.0     10-27    154<H>  |  110<H>  |  36<H>  ----------------------------<  252<H>  3.7   |  33<H>  |  1.06  10-27    154<H>  |  111<H>  |  40<H>  ----------------------------<  338<H>  3.3<L>   |  34<H>  |  1.11  10-26    157<H>  |  112<H>  |  40<H>  ----------------------------<  280<H>  3.6   |  34<H>  |  1.04  10-26    139  |  99  |  37<H>  ----------------------------<  665<HH>  3.0<L>   |  30  |  0.93  10-26    153<H>  |  109<H>  |  44<H>  ----------------------------<  191<H>  3.2<L>   |  31  |  1.08  10-25    153<H>  |  109<H>  |  35<H>  ----------------------------<  187<H>  5.3   |  27  |  0.96  10-24    156<H>  |  112<H>  |  34<H>  ----------------------------<  63<L>  3.8   |  35<H>  |  0.98    Ca    8.4      27 Oct 2022 06:30  Ca    8.2<L>      27 Oct 2022 00:47  Ca    8.4      26 Oct 2022 18:20  Ca    7.7<L>      26 Oct 2022 16:40  Ca    8.5      26 Oct 2022 05:15  Phos  2.4     10-27  Phos  2.4     10-27  Phos  2.6     10-26  Phos  2.3     10-26  Phos  3.2     10-26  Mg     1.80     10-27  Mg     1.70     10-27  Mg     1.80     10-26  Mg     1.70     10-26  Mg     1.90     10-26    TPro  5.7<L>  /  Alb  2.5<L>  /  TBili  0.5  /  DBili  x   /  AST  31  /  ALT  8   /  AlkPhos  90  10-25  TPro  5.9<L>  /  Alb  2.5<L>  /  TBili  0.5  /  DBili  x   /  AST  13  /  ALT  7   /  AlkPhos  93  10-24    CAPILLARY BLOOD GLUCOSE      POCT Blood Glucose.: 294 mg/dL (27 Oct 2022 12:22)  POCT Blood Glucose.: 275 mg/dL (27 Oct 2022 08:55)  POCT Blood Glucose.: 284 mg/dL (26 Oct 2022 23:33)  POCT Blood Glucose.: 282 mg/dL (26 Oct 2022 18:19)  POCT Blood Glucose.: 279 mg/dL (26 Oct 2022 18:18)        PT/INR - ( 27 Oct 2022 06:30 )   PT: 29.6 sec;   INR: 2.53 ratio         PTT - ( 27 Oct 2022 06:30 )  PTT:34.2 sec    D-Dimer Assay, Quantitative: 399 ng/mL DDU (10-25 @ 01:05)  Procalcitonin, Serum: 0.26 ng/mL (10-24 @ 23:05)  Serum Pro-Brain Natriuretic Peptide: 4737 pg/mL (10-24 @ 21:30)        RECENT CULTURES:  10-25 @ 09:51 Clean Catch Clean Catch (Midstream)                >=3 organisms. Probable collection contamination.    rad< from: Xray Chest 1 View- PORTABLE-Routine (Xray Chest 1 View- PORTABLE-Routine in AM.) (10.27.22 @ 07:35) >  ACC: [id number] EXAM:  XR CHEST PORTABLE ROUTINE 1V                          PROCEDURE DATE:  10/26/2022          INTERPRETATION:  Chest one view 10/26/2022 7:19 AM    HISTORY: Postthoracentesis    COMPARISON STUDY: 10/24/2022    Frontal expiratory view of the chest shows the heart to be similar in   size. Left cardiac pacemaker is again noted. The lungs show mild right   lung congestion with progression of left chest whiteout and there is no   evidence of pneumothorax nor right pleural effusion.    Chest one view 10/27/2022 7:12 AM  Compared to the prior study, there is improved aeration of the left lung.   Pulmonary vascularity is less congested. There is no pneumothorax.    IMPRESSION:  Left chest whiteout followed by improved left lung aeration. No   pneumothorax.        Thank you for the courtesy of this referral.    --- End of Report ---      < end of copied text >        RESPIRATORY CULTURES:          Studies  Chest X-RAY  CT SCAN Chest   Venous Dopplers: LE:   CT Abdomen  Others

## 2022-10-27 NOTE — PROGRESS NOTE ADULT - SUBJECTIVE AND OBJECTIVE BOX
[facility] [place], [place] [facility] - ORESTES Wall / ORESTES Wynne / KARIE Melendez/ ORESTES Knight/ ORESTES Jang/ ADRIA Lee / TOÑITO Mora / ROBB Kapoor  ---------------------------------------------------------------------------------------------------------------  seen and examined today for Hypernatremia  Interval : sodium stable  VITALS:  T(F): 98.1 (10-27-22 @ 05:32), Max: 98.2 (10-26-22 @ 17:30)  HR: 76 (10-27-22 @ 08:22)  BP: 134/82 (10-27-22 @ 05:32)  RR: 18 (10-27-22 @ 05:32)  SpO2: 98% (10-27-22 @ 08:22)  Wt(kg): --    10-26 @ 07:01  -  10-27 @ 07:00  --------------------------------------------------------  IN: 0 mL / OUT: 2900 mL / NET: -2900 mL      Physical Exam :-  Constitutional: NAD  Neck: Supple.  Respiratory: Bilateral equal breath sounds,  Cardiovascular: S1, S2 normal,  Gastrointestinal: Bowel Sounds present, soft, non tender.  Extremities: B/L BKA  Neurological: Alert and Oriented  Psychiatric: Normal mood, normal affect  Data:-  Allergies :   No Known Allergies    Hospital Medications:   MEDICATIONS  (STANDING):  aspirin enteric coated 81 milliGRAM(s) Oral daily  atorvastatin 80 milliGRAM(s) Oral at bedtime  BACItracin   Ointment 1 Application(s) Topical daily  buDESOnide    Inhalation Suspension 0.5 milliGRAM(s) Inhalation every 12 hours  collagenase Ointment 1 Application(s) Topical daily  dextrose 5%. 1000 milliLiter(s) (50 mL/Hr) IV Continuous <Continuous>  dextrose 5%. 1000 milliLiter(s) (75 mL/Hr) IV Continuous <Continuous>  dextrose 5%. 1000 milliLiter(s) (100 mL/Hr) IV Continuous <Continuous>  dextrose 50% Injectable 25 Gram(s) IV Push once  dextrose 50% Injectable 12.5 Gram(s) IV Push once  dextrose 50% Injectable 25 Gram(s) IV Push once  finasteride 5 milliGRAM(s) Oral daily  furosemide   Injectable 40 milliGRAM(s) IV Push every 12 hours  glucagon  Injectable 1 milliGRAM(s) IntraMuscular once  insulin glargine Injectable (LANTUS) 10 Unit(s) SubCutaneous at bedtime  insulin lispro (ADMELOG) corrective regimen sliding scale   SubCutaneous three times a day before meals  insulin lispro (ADMELOG) corrective regimen sliding scale   SubCutaneous at bedtime  insulin lispro Injectable (ADMELOG) 6 Unit(s) SubCutaneous three times a day before meals  levothyroxine Injectable 20 MICROGram(s) IV Push at bedtime  metoprolol tartrate 50 milliGRAM(s) Oral two times a day  montelukast 10 milliGRAM(s) Oral daily  pantoprazole  Injectable 40 milliGRAM(s) IV Push every 24 hours  polyethylene glycol 3350 17 Gram(s) Oral every 12 hours  tamsulosin 0.4 milliGRAM(s) Oral at bedtime    10-27    154<H>  |  110<H>  |  36<H>  ----------------------------<  252<H>  3.7   |  33<H>  |  1.06    Ca    8.4      27 Oct 2022 06:30  Phos  2.4     10-27  Mg     1.80     10-27      Creatinine Trend: 1.06 <--, 1.11 <--, 1.04 <--, 0.93 <--, 1.08 <--, 0.96 <--, 0.98 <--                        8.0    7.61  )-----------( 222      ( 26 Oct 2022 05:15 )             28.3

## 2022-10-27 NOTE — DIETITIAN INITIAL EVALUATION ADULT - NSFNSPHYEXAMSKINFT_GEN_A_CORE
Stage 1 posterior ear and Lt buttocks.  Wound- cervical spine, Lt upper posterior arm per RN flow sheet.

## 2022-10-27 NOTE — PROGRESS NOTE ADULT - ASSESSMENT
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

## 2022-10-27 NOTE — PROGRESS NOTE ADULT - SUBJECTIVE AND OBJECTIVE BOX
CARDIOLOGY FOLLOW UP NOTE - DR. MARI    Patient Name: LIBRA PEÑA  Date of Service: 10-27-22 @ 11:52    Patient seen and examined    Subjective:    cv: denies chest pain, dyspnea, palpitations, dizziness  pulmonary: denies cough  GI: denies abdominal pain, nausea, vomiting  vascular/legs: no edema   skin: no rash  ROS: otherwise negative   overnight events:      PHYSICAL EXAM:  T(C): 36.7 (10-27-22 @ 05:32), Max: 36.8 (10-26-22 @ 12:16)  HR: 76 (10-27-22 @ 08:22) (67 - 99)  BP: 134/82 (10-27-22 @ 05:32) (123/55 - 141/56)  RR: 18 (10-27-22 @ 05:32) (18 - 19)  SpO2: 98% (10-27-22 @ 08:22) (90% - 99%)  Wt(kg): --  I&O's Summary    26 Oct 2022 07:01  -  27 Oct 2022 07:00  --------------------------------------------------------  IN: 0 mL / OUT: 2900 mL / NET: -2900 mL      Daily     Daily     Appearance: Normal	  Cardiovascular: Normal S1 S2,RRR, No JVD, No murmurs  Respiratory: Lungs clear to auscultation	  Gastrointestinal:  Soft, Non-tender, + BS	  Extremities: Normal range of motion, No clubbing, cyanosis or edema      Home Medications:  acetaminophen 325 mg oral tablet: 2 tab(s) orally every 6 hours, As needed, Moderate Pain (4 - 6) (23 Sep 2022 16:43)  acetylcysteine 20% inhalation solution: 4 milliliter(s) inhaled 4 times a day as needed (25 Oct 2022 10:37)  acetylcysteine 20% inhalation solution: 4 milliliter(s) inhaled 3 times a day (23 Sep 2022 16:43)  aspirin 81 mg oral delayed release tablet: 1 tab(s) orally once a day (23 Sep 2022 16:43)  Aspirin Enteric Coated 81 mg oral delayed release tablet: 1 tab(s) orally once a day (25 Oct 2022 10:37)  atorvastatin 80 mg oral tablet: 1 tab(s) orally once a day (at bedtime) (23 Sep 2022 16:43)  bacitracin 500 units/g topical ointment: Apply topically to affected area twice to back (25 Oct 2022 10:37)  budesonide 0.5 mg/2 mL inhalation suspension: 0.5 milligram(s) inhaled 2 times a day (14 Sep 2022 19:13)  budesonide 0.5 mg/2 mL inhalation suspension: 2 milliliter(s) inhaled 2 times a day (25 Oct 2022 10:37)  cadexomer iodine 0.9% topical gel: 1 application topically once a day (23 Sep 2022 16:43)  Coumadin 3 mg oral tablet: 1 tab(s) orally once a day (at bedtime) (23 Sep 2022 16:43)  Coumadin 4 mg oral tablet: 1 tab(s) orally once a day (25 Oct 2022 10:37)  finasteride 5 mg oral tablet: 1 tab(s) orally once a day (14 Sep 2022 19:13)  finasteride 5 mg oral tablet: 1 tab(s) orally once a day (25 Oct 2022 10:37)  Flomax 0.4 mg oral capsule: 1 cap(s) orally once a day (25 Oct 2022 10:37)  gabapentin 100 mg oral capsule: 1 tab(s) orally 2 times a day (25 Oct 2022 10:37)  gabapentin 100 mg oral tablet: 1 tab(s) orally 2 times a day (14 Sep 2022 19:13)  guaifenesin-dextromethorphan 100 mg-10 mg/5 mL oral liquid: 10 milliliter(s) orally every 6 hours (23 Sep 2022 16:43)  insulin glargine 100 units/mL subcutaneous solution: 10 unit(s) subcutaneous once a day (at bedtime) (23 Sep 2022 16:43)  insulin lispro 100 units/mL injectable solution: 14 unit(s) injectable once a day before dinner (23 Sep 2022 18:00)  insulin lispro 100 units/mL injectable solution: 14 unit(s) injectable once a day before lunch (23 Sep 2022 18:00)  insulin lispro 100 units/mL injectable solution: 20 unit(s) injectable once a day before Breakfast (23 Sep 2022 18:00)  insulin lispro 100 units/mL subcutaneous solution: 14 unit(s) subcutaneous before lunch and dinner (25 Oct 2022 10:37)  Iodosorb 0.9% topical gel: to heel (25 Oct 2022 10:37)  ipratropium: inhalation as needed (25 Oct 2022 10:37)  ipratropium-albuterol 0.5 mg-2.5 mg/3 mL inhalation solution: 3 milliliter(s) inhaled every 6 hours (23 Sep 2022 16:43)  Lantus 100 units/mL subcutaneous solution: 10 unit(s) subcutaneous once a day (at bedtime) (25 Oct 2022 10:37)  Lasix 20 mg oral tablet: 1 tab(s) orally once a day (25 Oct 2022 10:37)  Lipitor 80 mg oral tablet: 1 tab(s) orally once a day (25 Oct 2022 10:37)  medihoney:  (25 Oct 2022 10:37)  metoprolol tartrate 50 mg oral tablet: 1 tab(s) orally 2 times a day (23 Sep 2022 16:43)  Metoprolol Tartrate 50 mg oral tablet: 1 tab(s) orally 2 times a day (25 Oct 2022 10:37)  montelukast 10 mg oral tablet: 1 tab(s) orally once a day (at bedtime) (23 Sep 2022 16:43)  Multiple Vitamins with Minerals oral tablet: 1 tab(s) orally once a day (23 Sep 2022 16:43)  pantoprazole 40 mg oral delayed release tablet: 1 tab(s) orally once a day (before a meal) (23 Sep 2022 16:43)  polyethylene glycol 3350 oral powder for reconstitution: 17 gram(s) orally once a day (23 Sep 2022 16:43)  predniSONE 50 mg oral tablet: 1 tab(s) orally once a day for one more dose on 9/24/22 then D/C (23 Sep 2022 16:43)  Protonix 40 mg oral delayed release tablet: 1 tab(s) orally once a day (25 Oct 2022 10:37)  senna leaf extract oral tablet: 2 tab(s) orally once a day (at bedtime) (23 Sep 2022 16:43)  Singulair 10 mg oral tablet: 1 tab(s) orally once a day (25 Oct 2022 10:37)  sodium chloride 3% inhalation solution: 4 milliliter(s) inhaled every 12 hours (23 Sep 2022 16:43)  Synthroid 25 mcg (0.025 mg) oral tablet: 1 tab(s) orally once a day (25 Oct 2022 10:37)  Synthroid 25 mcg (0.025 mg) oral tablet: 1 tab(s) orally once a day (14 Sep 2022 19:13)  tamsulosin 0.4 mg oral capsule: 2 cap(s) orally once a day (at bedtime) (14 Sep 2022 19:13)  zinc oxide topical cream: Apply topically to affected area twice to AKA (25 Oct 2022 10:37)      MEDICATIONS  (STANDING):  aspirin enteric coated 81 milliGRAM(s) Oral daily  atorvastatin 80 milliGRAM(s) Oral at bedtime  BACItracin   Ointment 1 Application(s) Topical daily  buDESOnide    Inhalation Suspension 0.5 milliGRAM(s) Inhalation every 12 hours  collagenase Ointment 1 Application(s) Topical daily  dextrose 5%. 1000 milliLiter(s) (50 mL/Hr) IV Continuous <Continuous>  dextrose 5%. 1000 milliLiter(s) (75 mL/Hr) IV Continuous <Continuous>  dextrose 5%. 1000 milliLiter(s) (100 mL/Hr) IV Continuous <Continuous>  dextrose 50% Injectable 25 Gram(s) IV Push once  dextrose 50% Injectable 12.5 Gram(s) IV Push once  dextrose 50% Injectable 25 Gram(s) IV Push once  finasteride 5 milliGRAM(s) Oral daily  furosemide   Injectable 40 milliGRAM(s) IV Push every 12 hours  glucagon  Injectable 1 milliGRAM(s) IntraMuscular once  insulin glargine Injectable (LANTUS) 10 Unit(s) SubCutaneous at bedtime  insulin lispro (ADMELOG) corrective regimen sliding scale   SubCutaneous three times a day before meals  insulin lispro (ADMELOG) corrective regimen sliding scale   SubCutaneous at bedtime  insulin lispro Injectable (ADMELOG) 6 Unit(s) SubCutaneous three times a day before meals  levothyroxine Injectable 20 MICROGram(s) IV Push at bedtime  metoprolol tartrate 50 milliGRAM(s) Oral two times a day  montelukast 10 milliGRAM(s) Oral daily  pantoprazole  Injectable 40 milliGRAM(s) IV Push every 24 hours  polyethylene glycol 3350 17 Gram(s) Oral every 12 hours  tamsulosin 0.4 milliGRAM(s) Oral at bedtime      TELEMETRY: 	    ECG:  	  RADIOLOGY:   DIAGNOSTIC TESTING:  [ ] Echocardiogram:  [ ] Catheterization:  [ ] Stress Test:    OTHER: 	    LABS:	 	    CARDIAC MARKERS:        Troponin T, High Sensitivity Result: 228 ng/L (10-25 @ 06:49)  Troponin T, High Sensitivity Result: 290 ng/L (10-24 @ 23:05)  Troponin T, High Sensitivity Result: 288 ng/L (10-24 @ 21:30)                                8.0    7.61  )-----------( 222      ( 26 Oct 2022 05:15 )             28.3     10-27    154<H>  |  110<H>  |  36<H>  ----------------------------<  252<H>  3.7   |  33<H>  |  1.06    Ca    8.4      27 Oct 2022 06:30  Phos  2.4     10-27  Mg     1.80     10-27      proBNP:   PT/INR - ( 27 Oct 2022 06:30 )   PT: 29.6 sec;   INR: 2.53 ratio         PTT - ( 27 Oct 2022 06:30 )  PTT:34.2 sec  Lipid Profile:   HgA1c:     Creatinine, Serum: 1.06 mg/dL (10-27-22 @ 06:30)  Creatinine, Serum: 1.11 mg/dL (10-27-22 @ 00:47)  Creatinine, Serum: 1.04 mg/dL (10-26-22 @ 18:20)  Creatinine, Serum: 0.93 mg/dL (10-26-22 @ 16:40)  Creatinine, Serum: 1.08 mg/dL (10-26-22 @ 05:15)  Creatinine, Serum: 0.96 mg/dL (10-25-22 @ 06:49)  Creatinine, Serum: 0.98 mg/dL (10-24-22 @ 21:30)

## 2022-10-27 NOTE — DIETITIAN INITIAL EVALUATION ADULT - NS FNS DIET ORDER
Diet, Pureed:   Consistent Carbohydrate {Evening Snack} (CSTCHOSN)  DASH/TLC {Sodium & Cholesterol Restricted} (DASH)  1500mL Fluid Restriction ([id number]) (10-26-22 @ 15:53)

## 2022-10-27 NOTE — CHART NOTE - NSCHARTNOTEFT_GEN_A_CORE
Patient's sodium level at 155 after being on D5W. Per nephrology, will increase D5W rate to 100 ml/hr for 24 hours. Will repeat BMP later tonight.

## 2022-10-28 NOTE — PROGRESS NOTE ADULT - PROBLEM SELECTOR PLAN 1
events noted: seen by thoracic surgery  : was supposed to get pleurl effusion drained today  : but labs needed to be optimized:  now due for tomorrow: he recently had thoracenteses  done on both sides at [facility]:  has poor LV function:  currently on diuresis    10/27: diuresis being done : no chest tube done as he is not optimized for the OR yet:  appreciate thoracic consult:  cont diuresis for now:  chest xray today seems  with better oxygenation    10/28: he seems to doing  ok ; no sob:  has large effusion on the  left side and he was recently tapped at [facility]:  has severe LV dysfunction:: pl for drainage on Monday : [facility] thoracic

## 2022-10-28 NOTE — PROGRESS NOTE ADULT - SUBJECTIVE AND OBJECTIVE BOX
CARDIOLOGY FOLLOW UP NOTE - DR. MARI    Patient Name: LIBRA PEÑA  Date of Service: 10-28-22 @ 11:58    Patient seen and examined  less dyspnea    Subjective:    cv: denies chest pain, palpitations, dizziness  pulmonary: denies cough  GI: denies abdominal pain, nausea, vomiting  vascular/legs: + edema   skin: no rash  ROS: otherwise negative   overnight events:      PHYSICAL EXAM:  T(C): 36.6 (10-27-22 @ 21:20), Max: 36.7 (10-27-22 @ 13:00)  HR: 66 (10-28-22 @ 08:45) (65 - 108)  BP: 126/71 (10-28-22 @ 02:00) (110/80 - 145/76)  RR: 16 (10-28-22 @ 02:00) (16 - 18)  SpO2: 98% (10-28-22 @ 08:45) (92% - 100%)  Wt(kg): --  I&O's Summary    27 Oct 2022 07:01  -  28 Oct 2022 07:00  --------------------------------------------------------  IN: 293 mL / OUT: 1700 mL / NET: -1407 mL      Daily     Daily     Appearance: Normal	  Cardiovascular: Normal S1 S2,RRR, No JVD, No murmurs  Respiratory: Lungs clear to auscultation	dec bs bases  Gastrointestinal:  Soft, Non-tender, + BS	  Extremities: Normal range of motion, + edema b/l      Home Medications:  acetaminophen 325 mg oral tablet: 2 tab(s) orally every 6 hours, As needed, Moderate Pain (4 - 6) (23 Sep 2022 16:43)  acetylcysteine 20% inhalation solution: 4 milliliter(s) inhaled 4 times a day as needed (25 Oct 2022 10:37)  acetylcysteine 20% inhalation solution: 4 milliliter(s) inhaled 3 times a day (23 Sep 2022 16:43)  aspirin 81 mg oral delayed release tablet: 1 tab(s) orally once a day (23 Sep 2022 16:43)  Aspirin Enteric Coated 81 mg oral delayed release tablet: 1 tab(s) orally once a day (25 Oct 2022 10:37)  atorvastatin 80 mg oral tablet: 1 tab(s) orally once a day (at bedtime) (23 Sep 2022 16:43)  bacitracin 500 units/g topical ointment: Apply topically to affected area twice to back (25 Oct 2022 10:37)  budesonide 0.5 mg/2 mL inhalation suspension: 0.5 milligram(s) inhaled 2 times a day (14 Sep 2022 19:13)  budesonide 0.5 mg/2 mL inhalation suspension: 2 milliliter(s) inhaled 2 times a day (25 Oct 2022 10:37)  cadexomer iodine 0.9% topical gel: 1 application topically once a day (23 Sep 2022 16:43)  Coumadin 3 mg oral tablet: 1 tab(s) orally once a day (at bedtime) (23 Sep 2022 16:43)  Coumadin 4 mg oral tablet: 1 tab(s) orally once a day (25 Oct 2022 10:37)  finasteride 5 mg oral tablet: 1 tab(s) orally once a day (14 Sep 2022 19:13)  finasteride 5 mg oral tablet: 1 tab(s) orally once a day (25 Oct 2022 10:37)  Flomax 0.4 mg oral capsule: 1 cap(s) orally once a day (25 Oct 2022 10:37)  gabapentin 100 mg oral capsule: 1 tab(s) orally 2 times a day (25 Oct 2022 10:37)  gabapentin 100 mg oral tablet: 1 tab(s) orally 2 times a day (14 Sep 2022 19:13)  guaifenesin-dextromethorphan 100 mg-10 mg/5 mL oral liquid: 10 milliliter(s) orally every 6 hours (23 Sep 2022 16:43)  insulin glargine 100 units/mL subcutaneous solution: 10 unit(s) subcutaneous once a day (at bedtime) (23 Sep 2022 16:43)  insulin lispro 100 units/mL injectable solution: 14 unit(s) injectable once a day before dinner (23 Sep 2022 18:00)  insulin lispro 100 units/mL injectable solution: 14 unit(s) injectable once a day before lunch (23 Sep 2022 18:00)  insulin lispro 100 units/mL injectable solution: 20 unit(s) injectable once a day before Breakfast (23 Sep 2022 18:00)  insulin lispro 100 units/mL subcutaneous solution: 14 unit(s) subcutaneous before lunch and dinner (25 Oct 2022 10:37)  Iodosorb 0.9% topical gel: to heel (25 Oct 2022 10:37)  ipratropium: inhalation as needed (25 Oct 2022 10:37)  ipratropium-albuterol 0.5 mg-2.5 mg/3 mL inhalation solution: 3 milliliter(s) inhaled every 6 hours (23 Sep 2022 16:43)  Lantus 100 units/mL subcutaneous solution: 10 unit(s) subcutaneous once a day (at bedtime) (25 Oct 2022 10:37)  Lasix 20 mg oral tablet: 1 tab(s) orally once a day (25 Oct 2022 10:37)  Lipitor 80 mg oral tablet: 1 tab(s) orally once a day (25 Oct 2022 10:37)  medihoney:  (25 Oct 2022 10:37)  metoprolol tartrate 50 mg oral tablet: 1 tab(s) orally 2 times a day (23 Sep 2022 16:43)  Metoprolol Tartrate 50 mg oral tablet: 1 tab(s) orally 2 times a day (25 Oct 2022 10:37)  montelukast 10 mg oral tablet: 1 tab(s) orally once a day (at bedtime) (23 Sep 2022 16:43)  Multiple Vitamins with Minerals oral tablet: 1 tab(s) orally once a day (23 Sep 2022 16:43)  pantoprazole 40 mg oral delayed release tablet: 1 tab(s) orally once a day (before a meal) (23 Sep 2022 16:43)  polyethylene glycol 3350 oral powder for reconstitution: 17 gram(s) orally once a day (23 Sep 2022 16:43)  predniSONE 50 mg oral tablet: 1 tab(s) orally once a day for one more dose on 9/24/22 then D/C (23 Sep 2022 16:43)  Protonix 40 mg oral delayed release tablet: 1 tab(s) orally once a day (25 Oct 2022 10:37)  senna leaf extract oral tablet: 2 tab(s) orally once a day (at bedtime) (23 Sep 2022 16:43)  Singulair 10 mg oral tablet: 1 tab(s) orally once a day (25 Oct 2022 10:37)  sodium chloride 3% inhalation solution: 4 milliliter(s) inhaled every 12 hours (23 Sep 2022 16:43)  Synthroid 25 mcg (0.025 mg) oral tablet: 1 tab(s) orally once a day (25 Oct 2022 10:37)  Synthroid 25 mcg (0.025 mg) oral tablet: 1 tab(s) orally once a day (14 Sep 2022 19:13)  tamsulosin 0.4 mg oral capsule: 2 cap(s) orally once a day (at bedtime) (14 Sep 2022 19:13)  zinc oxide topical cream: Apply topically to affected area twice to AKA (25 Oct 2022 10:37)      MEDICATIONS  (STANDING):  aspirin enteric coated 81 milliGRAM(s) Oral daily  atorvastatin 80 milliGRAM(s) Oral at bedtime  BACItracin   Ointment 1 Application(s) Topical daily  buDESOnide    Inhalation Suspension 0.5 milliGRAM(s) Inhalation every 12 hours  collagenase Ointment 1 Application(s) Topical daily  dextrose 5%. 1000 milliLiter(s) (50 mL/Hr) IV Continuous <Continuous>  dextrose 5%. 1000 milliLiter(s) (100 mL/Hr) IV Continuous <Continuous>  dextrose 50% Injectable 12.5 Gram(s) IV Push once  dextrose 50% Injectable 25 Gram(s) IV Push once  dextrose 50% Injectable 25 Gram(s) IV Push once  finasteride 5 milliGRAM(s) Oral daily  furosemide   Injectable 40 milliGRAM(s) IV Push every 12 hours  glucagon  Injectable 1 milliGRAM(s) IntraMuscular once  insulin glargine Injectable (LANTUS) 10 Unit(s) SubCutaneous at bedtime  insulin lispro (ADMELOG) corrective regimen sliding scale   SubCutaneous three times a day before meals  insulin lispro (ADMELOG) corrective regimen sliding scale   SubCutaneous at bedtime  insulin lispro Injectable (ADMELOG) 6 Unit(s) SubCutaneous three times a day before meals  levothyroxine Injectable 20 MICROGram(s) IV Push at bedtime  magnesium sulfate  IVPB 1 Gram(s) IV Intermittent once  metoprolol tartrate 50 milliGRAM(s) Oral two times a day  montelukast 10 milliGRAM(s) Oral daily  pantoprazole  Injectable 40 milliGRAM(s) IV Push every 24 hours  polyethylene glycol 3350 17 Gram(s) Oral every 12 hours  tamsulosin 0.4 milliGRAM(s) Oral at bedtime      TELEMETRY: 	    ECG:  	  RADIOLOGY:   DIAGNOSTIC TESTING:  [ ] Echocardiogram:  [ ] Catheterization:  [ ] Stress Test:    OTHER: 	    LABS:	 	    CARDIAC MARKERS:        Troponin T, High Sensitivity Result: 228 ng/L (10-25 @ 06:49)  Troponin T, High Sensitivity Result: 290 ng/L (10-24 @ 23:05)  Troponin T, High Sensitivity Result: 288 ng/L (10-24 @ 21:30)                                8.0    6.63  )-----------( 141      ( 28 Oct 2022 05:20 )             28.4     10-28    136  |  96<L>  |  26<H>  ----------------------------<  519<HH>  2.9<LL>   |  33<H>  |  0.89    Ca    7.5<L>      28 Oct 2022 05:20  Phos  2.6     10-28  Mg     1.50     10-28      proBNP:   PT/INR - ( 28 Oct 2022 05:20 )   PT: 26.6 sec;   INR: 2.27 ratio         PTT - ( 28 Oct 2022 05:20 )  PTT:33.4 sec  Lipid Profile:   HgA1c:     Creatinine, Serum: 0.89 mg/dL (10-28-22 @ 05:20)  Creatinine, Serum: 1.00 mg/dL (10-27-22 @ 22:00)  Creatinine, Serum: 0.97 mg/dL (10-27-22 @ 15:26)  Creatinine, Serum: 1.06 mg/dL (10-27-22 @ 06:30)  Creatinine, Serum: 1.11 mg/dL (10-27-22 @ 00:47)  Creatinine, Serum: 1.04 mg/dL (10-26-22 @ 18:20)  Creatinine, Serum: 0.93 mg/dL (10-26-22 @ 16:40)  Creatinine, Serum: 1.08 mg/dL (10-26-22 @ 05:15)

## 2022-10-28 NOTE — PROGRESS NOTE ADULT - NSPROGADDITIONALINFOA_GEN_ALL_CORE
d/w pt and NP.  d/w card and renal.    Na improving  edema slightly worse  D5W discontinued    - Dr. NOEL Htet ([facility])  - [phone]

## 2022-10-28 NOTE — PROGRESS NOTE ADULT - SUBJECTIVE AND OBJECTIVE BOX
[facility] - ORESTES Wall / ORESTES Wynne / KARIE Melendez/ ORESTES Knight/ ORESTES Jang/ ADRIA Lee / TOÑITO Mora / ROBB Kapoor  ---------------------------------------------------------------------------------------------------------------  seen and examined today for hypernatremia  Interval : sudden drop in sodium again  VITALS:  T(F): 97.8 (10-27-22 @ 21:20), Max: 98 (10-27-22 @ 13:00)  HR: 66 (10-28-22 @ 08:45)  BP: 126/71 (10-28-22 @ 02:00)  RR: 16 (10-28-22 @ 02:00)  SpO2: 98% (10-28-22 @ 08:45)  Wt(kg): --    10-27 @ 07:01  -  10-28 @ 07:00  --------------------------------------------------------  IN: 293 mL / OUT: 1700 mL / NET: -1407 mL      Physical Exam :-  Constitutional: NAD  Neck: Supple.  Respiratory: Bilateral equal breath sounds,  Cardiovascular: S1, S2 normal,  Gastrointestinal: Bowel Sounds present, soft, non tender.  Extremities: B/L BKA  Neurological: Alert and Oriented   Psychiatric: Normal mood, normal affect  Data:-  Allergies :   No Known Allergies    Hospital Medications:   MEDICATIONS  (STANDING):  aspirin enteric coated 81 milliGRAM(s) Oral daily  atorvastatin 80 milliGRAM(s) Oral at bedtime  BACItracin   Ointment 1 Application(s) Topical daily  buDESOnide    Inhalation Suspension 0.5 milliGRAM(s) Inhalation every 12 hours  collagenase Ointment 1 Application(s) Topical daily  dextrose 5%. 1000 milliLiter(s) (50 mL/Hr) IV Continuous <Continuous>  dextrose 5%. 1000 milliLiter(s) (100 mL/Hr) IV Continuous <Continuous>  dextrose 50% Injectable 12.5 Gram(s) IV Push once  dextrose 50% Injectable 25 Gram(s) IV Push once  dextrose 50% Injectable 25 Gram(s) IV Push once  finasteride 5 milliGRAM(s) Oral daily  furosemide   Injectable 40 milliGRAM(s) IV Push every 12 hours  glucagon  Injectable 1 milliGRAM(s) IntraMuscular once  insulin glargine Injectable (LANTUS) 10 Unit(s) SubCutaneous at bedtime  insulin lispro (ADMELOG) corrective regimen sliding scale   SubCutaneous three times a day before meals  insulin lispro (ADMELOG) corrective regimen sliding scale   SubCutaneous at bedtime  insulin lispro Injectable (ADMELOG) 6 Unit(s) SubCutaneous three times a day before meals  levothyroxine Injectable 20 MICROGram(s) IV Push at bedtime  metoprolol tartrate 50 milliGRAM(s) Oral two times a day  montelukast 10 milliGRAM(s) Oral daily  pantoprazole  Injectable 40 milliGRAM(s) IV Push every 24 hours  polyethylene glycol 3350 17 Gram(s) Oral every 12 hours  tamsulosin 0.4 milliGRAM(s) Oral at bedtime    10-28    136  |  96<L>  |  26<H>  ----------------------------<  519<HH>  2.9<LL>   |  33<H>  |  0.89    Ca    7.5<L>      28 Oct 2022 05:20  Phos  2.6     10-28  Mg     1.50     10-28      Creatinine Trend: 0.89 <--, 1.00 <--, 0.97 <--, 1.06 <--, 1.11 <--, 1.04 <--, 0.93 <--, 1.08 <--, 0.96 <--, 0.98 <--                        8.0    6.63  )-----------( 141      ( 28 Oct 2022 05:20 )             28.4

## 2022-10-28 NOTE — PROGRESS NOTE ADULT - SUBJECTIVE AND OBJECTIVE BOX
Date of Service: 10-28-22 @ 15:58    Patient is a 87y old  Male who presents with a chief complaint of Acute hypoxic respiratory failure (28 Oct 2022 11:57)      Any change in ROS:  he is alert and awake:  no sob:   no cough :       MEDICATIONS  (STANDING):  aspirin enteric coated 81 milliGRAM(s) Oral daily  atorvastatin 80 milliGRAM(s) Oral at bedtime  BACItracin   Ointment 1 Application(s) Topical daily  buDESOnide    Inhalation Suspension 0.5 milliGRAM(s) Inhalation every 12 hours  collagenase Ointment 1 Application(s) Topical daily  dextrose 5%. 1000 milliLiter(s) (50 mL/Hr) IV Continuous <Continuous>  dextrose 5%. 1000 milliLiter(s) (100 mL/Hr) IV Continuous <Continuous>  dextrose 50% Injectable 25 Gram(s) IV Push once  dextrose 50% Injectable 12.5 Gram(s) IV Push once  dextrose 50% Injectable 25 Gram(s) IV Push once  finasteride 5 milliGRAM(s) Oral daily  furosemide   Injectable 40 milliGRAM(s) IV Push every 12 hours  glucagon  Injectable 1 milliGRAM(s) IntraMuscular once  insulin glargine Injectable (LANTUS) 10 Unit(s) SubCutaneous at bedtime  insulin lispro (ADMELOG) corrective regimen sliding scale   SubCutaneous three times a day before meals  insulin lispro (ADMELOG) corrective regimen sliding scale   SubCutaneous at bedtime  insulin lispro Injectable (ADMELOG) 6 Unit(s) SubCutaneous three times a day before meals  levothyroxine Injectable 20 MICROGram(s) IV Push at bedtime  metoprolol tartrate 50 milliGRAM(s) Oral two times a day  montelukast 10 milliGRAM(s) Oral daily  pantoprazole  Injectable 40 milliGRAM(s) IV Push every 24 hours  phytonadione   Solution 5 milliGRAM(s) Oral once  polyethylene glycol 3350 17 Gram(s) Oral every 12 hours  tamsulosin 0.4 milliGRAM(s) Oral at bedtime    MEDICATIONS  (PRN):  acetaminophen     Tablet .. 650 milliGRAM(s) Oral every 6 hours PRN Mild Pain (1 - 3), Moderate Pain (4 - 6)  albuterol/ipratropium for Nebulization 3 milliLiter(s) Nebulizer every 6 hours PRN Shortness of Breath and/or Wheezing  dextrose Oral Gel 15 Gram(s) Oral once PRN Blood Glucose LESS THAN 70 milliGRAM(s)/deciliter    Vital Signs Last 24 Hrs  T(C): 36.6 (27 Oct 2022 21:20), Max: 36.6 (27 Oct 2022 17:00)  T(F): 97.8 (27 Oct 2022 21:20), Max: 97.8 (27 Oct 2022 17:00)  HR: 89 (28 Oct 2022 11:56) (65 - 108)  BP: 126/71 (28 Oct 2022 02:00) (110/80 - 145/76)  BP(mean): --  RR: 16 (28 Oct 2022 02:00) (16 - 18)  SpO2: 97% (28 Oct 2022 11:56) (94% - 100%)    Parameters below as of 28 Oct 2022 08:45  Patient On (Oxygen Delivery Method): nasal cannula        I&O's Summary    27 Oct 2022 07:01  -  28 Oct 2022 07:00  --------------------------------------------------------  IN: 293 mL / OUT: 1700 mL / NET: -1407 mL          Physical Exam:   GENERAL: NAD, well-groomed, well-developed  HEENT: PETRONA/   Atraumatic, Normocephalic  ENMT: No tonsillar erythema, exudates, or enlargement; Moist mucous membranes, Good dentition, No lesions  NECK: Supple, No JVD, Normal thyroid  CHEST/LUNG: decreased air entry bilaterally:   CVS: Regular rate and rhythm; No murmurs, rubs, or gallops  GI: : Soft, Nontender, Nondistended; Bowel sounds present  NERVOUS SYSTEM:  Alert & Oriented X3  EXTREMITIES: + edema  LYMPH: No lymphadenopathy noted  SKIN: No rashes or lesions  ENDOCRINOLOGY: No Thyromegaly  PSYCH: calm +    Labs:  36, 39                            8.0    6.63  )-----------( 141      ( 28 Oct 2022 05:20 )             28.4                         8.0    7.61  )-----------( 222      ( 26 Oct 2022 05:15 )             28.3                         8.4    7.85  )-----------( 238      ( 25 Oct 2022 06:49 )             30.4                         8.2    8.67  )-----------( 267      ( 24 Oct 2022 21:30 )             30.0     10-28    143  |  103  |  27<H>  ----------------------------<  240<H>  3.2<L>   |  32<H>  |  0.92  10-28    136  |  96<L>  |  26<H>  ----------------------------<  519<HH>  2.9<LL>   |  33<H>  |  0.89  10-27    154<H>  |  110<H>  |  31<H>  ----------------------------<  175<H>  4.1   |  35<H>  |  1.00  10-27    155<H>  |  109<H>  |  34<H>  ----------------------------<  252<H>  3.3<L>   |  36<H>  |  0.97  10-27    154<H>  |  110<H>  |  36<H>  ----------------------------<  252<H>  3.7   |  33<H>  |  1.06  10-27    154<H>  |  111<H>  |  40<H>  ----------------------------<  338<H>  3.3<L>   |  34<H>  |  1.11  10-26    157<H>  |  112<H>  |  40<H>  ----------------------------<  280<H>  3.6   |  34<H>  |  1.04  10-26    139  |  99  |  37<H>  ----------------------------<  665<HH>  3.0<L>   |  30  |  0.93  10-26    153<H>  |  109<H>  |  44<H>  ----------------------------<  191<H>  3.2<L>   |  31  |  1.08  10-25    153<H>  |  109<H>  |  35<H>  ----------------------------<  187<H>  5.3   |  27  |  0.96    Ca    7.8<L>      28 Oct 2022 11:49  Ca    7.5<L>      28 Oct 2022 05:20  Ca    8.2<L>      27 Oct 2022 22:00  Ca    8.5      27 Oct 2022 15:26  Ca    8.4      27 Oct 2022 06:30  Phos  2.7     10-28  Phos  2.6     10-28  Phos  3.6     10-27  Phos  2.4     10-27  Phos  2.4     10-27  Mg     1.60     10-28  Mg     1.50     10-28  Mg     1.70     10-27  Mg     1.70     10-27  Mg     1.80     10-27    TPro  5.7<L>  /  Alb  2.5<L>  /  TBili  0.5  /  DBili  x   /  AST  31  /  ALT  8   /  AlkPhos  90  10-25  TPro  5.9<L>  /  Alb  2.5<L>  /  TBili  0.5  /  DBili  x   /  AST  13  /  ALT  7   /  AlkPhos  93  10-24    CAPILLARY BLOOD GLUCOSE      POCT Blood Glucose.: 244 mg/dL (28 Oct 2022 12:00)  POCT Blood Glucose.: 210 mg/dL (28 Oct 2022 10:05)  POCT Blood Glucose.: 251 mg/dL (28 Oct 2022 08:17)  POCT Blood Glucose.: 189 mg/dL (27 Oct 2022 22:06)  POCT Blood Glucose.: 226 mg/dL (27 Oct 2022 17:35)        PT/INR - ( 28 Oct 2022 05:20 )   PT: 26.6 sec;   INR: 2.27 ratio         PTT - ( 28 Oct 2022 05:20 )  PTT:33.4 sec    D-Dimer Assay, Quantitative: 399 ng/mL DDU (10-25 @ 01:05)  Procalcitonin, Serum: 0.26 ng/mL (10-24 @ 23:05)        RECENT CULTURES:  10-25 @ 09:51 Clean Catch Clean Catch (Midstream)       rad< from: Xray Chest 1 View- PORTABLE-Routine (Xray Chest 1 View- PORTABLE-Routine in AM.) (10.28.22 @ 05:56) >        INTERPRETATION:  Chest one view    HISTORY: Follow-up pleural effusion    COMPARISON STUDY: 10/27/2022    Frontal expiratory view of thechest shows the heart to be similar in   size. Left cardiac pacemaker is again noted.    The lungs show progression of left pleural effusion with partial clearing   of the right base and there is no evidence of pneumothorax nor right   pleural effusion.    IMPRESSION:  Progression of left effusion.        Thank you for the courtesy of this referral.    --- End of Report ---            HORACIO HELMS MD; Attending Interventional Radiologist  This document has been electronically signed. Oct 28 2022  1:38PM    < end of copied text >    r< from: Xray Chest 1 View- PORTABLE-Routine (Xray Chest 1 View- PORTABLE-Routine in AM.) (10.27.22 @ 07:35) >    INTERPRETATION:  Chest one view 10/26/2022 7:19 AM    HISTORY: Postthoracentesis    COMPARISON STUDY: 10/24/2022    Frontal expiratory view of the chest shows the heart to be similar in   size. Left cardiac pacemaker is again noted. The lungs show mild right   lung congestion with progression of left chest whiteout and there is no   evidence of pneumothorax nor right pleural effusion.    Chest one view 10/27/2022 7:12 AM  Compared to the prior study, there is improved aeration of the left lung.   Pulmonary vascularity is less congested. There is no pneumothorax.    IMPRESSION:  Left chest whiteout followed by improved left lung aeration. No   pneumothorax.        Thank you for the courtesy of this referral.    --- End of Report ---            HORACIO HELMS MD; Attending Interventional Radiologist  This document has been electronically signed. Oct 27 2022 11:29AM    < end of copied text >         >=3 organisms. Probable collection contamination.          RESPIRATORY CULTURES:          Studies  Chest X-RAY  CT SCAN Chest   Venous Dopplers: LE:   CT Abdomen  Others

## 2022-10-28 NOTE — PROGRESS NOTE ADULT - ASSESSMENT
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

## 2022-10-28 NOTE — PROGRESS NOTE ADULT - PROBLEM SELECTOR PLAN 1
-2/2 large pleural effusion and pulm vasc congestion  -troponin elevation likely 2/2 CHF exacerbation (stable x 3 sets). card following   -TTE: Severe global left ventricular systolic dysfunction. EF 28%  -tele monitoring  -Lasix 40 mg iv BID, has chronic hudson for strict ins and outs.  -bipap 14/7 w/ fio2 60%  -pulm consulted  - CT surgery eval for thoracentesis (INR 2.5, procedure canceled)  - vit K PO x 1 for elevated INR in anticipation for thoracentesis  - if INR below 2, will start hep gtt

## 2022-10-28 NOTE — PROGRESS NOTE ADULT - SUBJECTIVE AND OBJECTIVE BOX
SUBJECTIVE/ OVERNIGHT EVENTS:  Na improving  edema slightly worse  D5W discontinued  no cp, no sob, no n/v/d. no abdominal pain.  no headache, no dizziness.   comfortable on nasal canula  vit K PO x 1 for elevated INR in anticipation for thoracentesis  if below 2, will start hep gtt    --------------------------------------------------------------------------------------------  LABS:                        8.0    6.63  )-----------( 141      ( 28 Oct 2022 05:20 )             28.4     10-28    143  |  103  |  27<H>  ----------------------------<  240<H>  3.2<L>   |  32<H>  |  0.92    Ca    7.8<L>      28 Oct 2022 11:49  Phos  2.7     10-28  Mg     1.60     10-28      PT/INR - ( 28 Oct 2022 05:20 )   PT: 26.6 sec;   INR: 2.27 ratio         PTT - ( 28 Oct 2022 05:20 )  PTT:33.4 sec  CAPILLARY BLOOD GLUCOSE      POCT Blood Glucose.: 202 mg/dL (28 Oct 2022 17:27)  POCT Blood Glucose.: 244 mg/dL (28 Oct 2022 12:00)  POCT Blood Glucose.: 210 mg/dL (28 Oct 2022 10:05)  POCT Blood Glucose.: 251 mg/dL (28 Oct 2022 08:17)  POCT Blood Glucose.: 189 mg/dL (27 Oct 2022 22:06)            RADIOLOGY & ADDITIONAL TESTS:    Imaging Personally Reviewed:  [x] YES  [ ] NO    Consultant(s) Notes Reviewed:  [x] YES  [ ] NO    MEDICATIONS  (STANDING):  aspirin enteric coated 81 milliGRAM(s) Oral daily  atorvastatin 80 milliGRAM(s) Oral at bedtime  BACItracin   Ointment 1 Application(s) Topical daily  buDESOnide    Inhalation Suspension 0.5 milliGRAM(s) Inhalation every 12 hours  collagenase Ointment 1 Application(s) Topical daily  dextrose 5%. 1000 milliLiter(s) (50 mL/Hr) IV Continuous <Continuous>  dextrose 5%. 1000 milliLiter(s) (100 mL/Hr) IV Continuous <Continuous>  dextrose 50% Injectable 25 Gram(s) IV Push once  dextrose 50% Injectable 12.5 Gram(s) IV Push once  dextrose 50% Injectable 25 Gram(s) IV Push once  finasteride 5 milliGRAM(s) Oral daily  furosemide   Injectable 40 milliGRAM(s) IV Push every 12 hours  glucagon  Injectable 1 milliGRAM(s) IntraMuscular once  insulin glargine Injectable (LANTUS) 10 Unit(s) SubCutaneous at bedtime  insulin lispro (ADMELOG) corrective regimen sliding scale   SubCutaneous three times a day before meals  insulin lispro (ADMELOG) corrective regimen sliding scale   SubCutaneous at bedtime  insulin lispro Injectable (ADMELOG) 6 Unit(s) SubCutaneous three times a day before meals  levothyroxine Injectable 20 MICROGram(s) IV Push at bedtime  metoprolol tartrate 50 milliGRAM(s) Oral two times a day  montelukast 10 milliGRAM(s) Oral daily  pantoprazole  Injectable 40 milliGRAM(s) IV Push every 24 hours  phytonadione   Solution 5 milliGRAM(s) Oral once  polyethylene glycol 3350 17 Gram(s) Oral every 12 hours  tamsulosin 0.4 milliGRAM(s) Oral at bedtime    MEDICATIONS  (PRN):  acetaminophen     Tablet .. 650 milliGRAM(s) Oral every 6 hours PRN Mild Pain (1 - 3), Moderate Pain (4 - 6)  albuterol/ipratropium for Nebulization 3 milliLiter(s) Nebulizer every 6 hours PRN Shortness of Breath and/or Wheezing  dextrose Oral Gel 15 Gram(s) Oral once PRN Blood Glucose LESS THAN 70 milliGRAM(s)/deciliter      Care Discussed with Consultants/Other Providers [x] YES  [ ] NO    Vital Signs Last 24 Hrs  T(C): 36.6 (28 Oct 2022 09:57), Max: 36.6 (27 Oct 2022 21:20)  T(F): 97.8 (28 Oct 2022 09:57), Max: 97.8 (27 Oct 2022 21:20)  HR: 72 (28 Oct 2022 17:40) (65 - 108)  BP: 125/69 (28 Oct 2022 17:40) (110/80 - 126/71)  BP(mean): --  RR: 18 (28 Oct 2022 09:57) (16 - 18)  SpO2: 97% (28 Oct 2022 11:56) (94% - 98%)    Parameters below as of 28 Oct 2022 09:57  Patient On (Oxygen Delivery Method): nasal cannula  O2 Flow (L/min): 3    I&O's Summary    27 Oct 2022 07:01  -  28 Oct 2022 07:00  --------------------------------------------------------  IN: 293 mL / OUT: 1700 mL / NET: -1407 mL              PHYSICAL EXAM:  GENERAL: NAD, well-developed, obese man, comfortable on nasal canula  HEAD:  Atraumatic, Normocephalic  EYES: EOMI, PERRLA, conjunctiva and sclera clear  NECK: Supple, No JVD  CHEST/LUNG: mild decrease breath sounds bilaterally; No wheeze   HEART: Irregular rate and rhythm; No murmurs, rubs, or gallops  ABDOMEN: Soft, Nontender, Nondistended; Bowel sounds present  : [place] in place, britney color urine, neg CVAT   Neuro: AAOx3, no focal weakness, mild left UE weakness baseline   EXTREMITIES:  2+ Peripheral Pulses, No clubbing, cyanosis, + edema  SKIN: No rashes or lesions

## 2022-10-28 NOTE — PROGRESS NOTE ADULT - ASSESSMENT
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

## 2022-10-28 NOTE — PROGRESS NOTE ADULT - ASSESSMENT
TTE with Doppler (w/Cont) (04.03.22 @ 15:07) >  mild aortic stenosis. . Mild left ventricular systolic dysfunction. The inferior wall, and the inferoseptum are hypokinetic.  Echo 9/15/22: .EF 35-40% Severe segmental left ventricular systolic dysfunction. The mid to distal anteroseptum and severely hypokinetic. The mid to basal inferolateral wall is hypokinetic.      a/p  86 yo male pmhx BPH, PAD s/p R BKA, COPD (not on home O2), HTN, HLD, afib, PPM ([employer])  CVA w/ residual L hemiparesis on coumadin, insulin-dependent diabetes, sys CHF on lasix presents with shortness of breath    #SOB   -secondary to chf and large left effusuion  -pt initially off BIPAP now on NC  -cont IV Lasix as ordered  -prior cta chest with no pe, Moderate left and small right pleural effusions, increased since May  2022, with worsening lower lobe compressive atelectasis. New right lung patchy and nodular areas of consolidation, likely  infectious/inflammatory  -Pulm/Med/thoracic  f/u  -recent echo with .EF 35-40% Severe segmental left ventricular systolic dysfunction. The mid to distal anteroseptum and severely hypokinetic. The mid to basal inferolateral wall is hypokinetic.  -await OR with thoracic surgery next week     # acute on chronic systolic CHF   -stable, cont diuretics   -repeat echo with moderate severe lv dysfxn, unchanged from echo  4/2022  -continue medical management of CMP/HF  -ecg, no ischemic changes, HS trop elevated secondary to hypoxia/ chf/ resp infection   -c/w bb     #Pafib, sp PPM   -c/w BB  -off oral ac/, inr > 2     #mild as   -echo stable     #Abnl UA  -abx per med      #hypernatremia   -renal f/u    35 minutes spent on total encounter; more than 50% of the visit was spent counseling and/or coordinating care by the attending physician.

## 2022-10-28 NOTE — PROGRESS NOTE ADULT - ASSESSMENT
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 w/ lethargy and sob.     Hypernatremia  The patient appears hypervolemic  Agreed with Lasix 40mg IV BID  Agreed with IV D5W with continual diuresis to promote natreuresis  sodium crop noted along with all lytes and high glucose, most likely due to blood draw diluted with IV D5W, please redraw BMP, agree with holding IVF for now  BMP q12hrs for now    B/L Pleural effusions + Left lung collapse  Per thoracic surgery      For any question, call:  Cell # [phone]  Pager # [phone]  Callback # [phone]

## 2022-10-28 NOTE — PROVIDER CONTACT NOTE (CRITICAL VALUE NOTIFICATION) - ACTION/TREATMENT ORDERED:
ACP will review chart ACP will review chart  0807 ACP place order to do fingerstick    0820 fingerstick text paged to provider [phone]

## 2022-10-28 NOTE — PROGRESS NOTE ADULT - SUBJECTIVE AND OBJECTIVE BOX
Patient is a 87y old  Male who presents with a chief complaint of Acute hypoxic respiratory failure (27 Oct 2022 14:28)       INTERVAL HPI/OVERNIGHT EVENTS:  Patient seen and evaluated at bedside.  Pt is resting comfortable in NAD. Denies N/V/F/C.  Pain rated at X/10    Allergies    No Known Allergies    Intolerances        Vital Signs Last 24 Hrs  T(C): 36.6 (27 Oct 2022 21:20), Max: 36.7 (27 Oct 2022 13:00)  T(F): 97.8 (27 Oct 2022 21:20), Max: 98 (27 Oct 2022 13:00)  HR: 66 (28 Oct 2022 08:45) (65 - 108)  BP: 126/71 (28 Oct 2022 02:00) (110/80 - 145/76)  BP(mean): --  RR: 16 (28 Oct 2022 02:00) (16 - 18)  SpO2: 98% (28 Oct 2022 08:45) (92% - 100%)    Parameters below as of 28 Oct 2022 08:45  Patient On (Oxygen Delivery Method): nasal cannula        LABS:                        8.0    6.63  )-----------( 141      ( 28 Oct 2022 05:20 )             28.4     10-28    136  |  96<L>  |  26<H>  ----------------------------<  519<HH>  2.9<LL>   |  33<H>  |  0.89    Ca    7.5<L>      28 Oct 2022 05:20  Phos  2.6     10-28  Mg     1.50     10-28      PT/INR - ( 28 Oct 2022 05:20 )   PT: 26.6 sec;   INR: 2.27 ratio         PTT - ( 28 Oct 2022 05:20 )  PTT:33.4 sec    CAPILLARY BLOOD GLUCOSE      POCT Blood Glucose.: 251 mg/dL (28 Oct 2022 08:17)  POCT Blood Glucose.: 189 mg/dL (27 Oct 2022 22:06)  POCT Blood Glucose.: 226 mg/dL (27 Oct 2022 17:35)  POCT Blood Glucose.: 294 mg/dL (27 Oct 2022 12:22)      Lower Extremity Physical Exam:  s/p RLE BKA amputation  Vasular: DP/PT 0/4, L, CFT < 3 seconds L, Temperature gradient warm to cool, L.   Neuro: Epicritic sensation diminished to the level of the toes, L.  Musculoskeletal/Ortho: R BKA  Skin: punctate posterior heel wound to level of subq with fibrotic base, no undermining or tracking, no purulence or malodor, lateral 5th MPJ punctate wound to level of capsule with fibrotic base, mild periwound erythema, no drainage or clinical signs of infection. Right BKA.    RADIOLOGY & ADDITIONAL TESTS:

## 2022-10-28 NOTE — CHART NOTE - NSCHARTNOTEFT_GEN_A_CORE
Patient seen at bedside by Thoracic.   Pt not optimized for OR yet.   Will need medical optimization prior to OR.  Planning for OR Monday pending optimization.    Vital Signs Last 24 Hrs  T(C): 36.6 (27 Oct 2022 21:20), Max: 36.6 (27 Oct 2022 17:00)  T(F): 97.8 (27 Oct 2022 21:20), Max: 97.8 (27 Oct 2022 17:00)  HR: 89 (28 Oct 2022 11:56) (65 - 108)  BP: 126/71 (28 Oct 2022 02:00) (110/80 - 145/76)  BP(mean): --  ABP: --  ABP(mean): --  RR: 16 (28 Oct 2022 02:00) (16 - 18)  SpO2: 97% (28 Oct 2022 11:56) (94% - 100%)    O2 Parameters below as of 28 Oct 2022 08:45  Patient On (Oxygen Delivery Method): nasal cannula 4L    General: NAD  Neurology: Awake; AAOx2 to person and location.  CHEST/LUNG: Diminished breath sounds left lung field, crackles right lung field.  HEART: Regular rate and rhythm  Relevant labs, radiology and Medications reviewed    Plan:  -Case booked for Monday (10/31) pending medical optimization. Primary team aware.  -Daily CXRs  -Must be optimized for OR, will need labs (Coags, CBC, CMP, active T&S, active Covid)  -Remainder of care per primary team.

## 2022-10-29 NOTE — PROGRESS NOTE ADULT - SUBJECTIVE AND OBJECTIVE BOX
Date of Service: 10-29-22 @ 13:34    Patient is a 87y old  Male who presents with a chief complaint of Acute hypoxic respiratory failure (29 Oct 2022 11:54)      Any change in ROS: He is doing same:    on 3 L of oxygen :     MEDICATIONS  (STANDING):  aspirin enteric coated 81 milliGRAM(s) Oral daily  atorvastatin 80 milliGRAM(s) Oral at bedtime  BACItracin   Ointment 1 Application(s) Topical daily  buDESOnide    Inhalation Suspension 0.5 milliGRAM(s) Inhalation every 12 hours  collagenase Ointment 1 Application(s) Topical daily  dextrose 5%. 1000 milliLiter(s) (75 mL/Hr) IV Continuous <Continuous>  dextrose 5%. 1000 milliLiter(s) (100 mL/Hr) IV Continuous <Continuous>  dextrose 5%. 1000 milliLiter(s) (50 mL/Hr) IV Continuous <Continuous>  dextrose 50% Injectable 25 Gram(s) IV Push once  dextrose 50% Injectable 12.5 Gram(s) IV Push once  dextrose 50% Injectable 25 Gram(s) IV Push once  enoxaparin Injectable 90 milliGRAM(s) SubCutaneous every 12 hours  finasteride 5 milliGRAM(s) Oral daily  glucagon  Injectable 1 milliGRAM(s) IntraMuscular once  insulin glargine Injectable (LANTUS) 10 Unit(s) SubCutaneous at bedtime  insulin lispro (ADMELOG) corrective regimen sliding scale   SubCutaneous three times a day before meals  insulin lispro (ADMELOG) corrective regimen sliding scale   SubCutaneous at bedtime  insulin lispro Injectable (ADMELOG) 6 Unit(s) SubCutaneous three times a day before meals  levothyroxine Injectable 20 MICROGram(s) IV Push at bedtime  metoprolol tartrate 50 milliGRAM(s) Oral two times a day  montelukast 10 milliGRAM(s) Oral daily  pantoprazole  Injectable 40 milliGRAM(s) IV Push every 24 hours  polyethylene glycol 3350 17 Gram(s) Oral every 12 hours  tamsulosin 0.4 milliGRAM(s) Oral at bedtime    MEDICATIONS  (PRN):  acetaminophen     Tablet .. 650 milliGRAM(s) Oral every 6 hours PRN Mild Pain (1 - 3), Moderate Pain (4 - 6)  albuterol/ipratropium for Nebulization 3 milliLiter(s) Nebulizer every 6 hours PRN Shortness of Breath and/or Wheezing  dextrose Oral Gel 15 Gram(s) Oral once PRN Blood Glucose LESS THAN 70 milliGRAM(s)/deciliter    Vital Signs Last 24 Hrs  T(C): 36.7 (29 Oct 2022 09:30), Max: 36.8 (29 Oct 2022 01:37)  T(F): 98.1 (29 Oct 2022 09:30), Max: 98.2 (29 Oct 2022 01:37)  HR: 99 (29 Oct 2022 09:30) (72 - 113)  BP: 114/66 (29 Oct 2022 09:30) (108/56 - 128/71)  BP(mean): --  RR: 18 (29 Oct 2022 09:30) (18 - 18)  SpO2: 100% (29 Oct 2022 09:30) (95% - 100%)    Parameters below as of 29 Oct 2022 09:30  Patient On (Oxygen Delivery Method): nasal cannula  O2 Flow (L/min): 3      I&O's Summary        Physical Exam:   GENERAL: NAD, well-groomed, well-developed  HEENT: PETRONA/   Atraumatic, Normocephalic  ENMT: No tonsillar erythema, exudates, or enlargement; Moist mucous membranes, Good dentition, No lesions  NECK: Supple, No JVD, Normal thyroid  CHEST/LUNG: decreased air [id number] bilaterally:   CVS: Regular rate and rhythm; No murmurs, rubs, or gallops  GI: : Soft, Nontender, Nondistended; Bowel sounds present  NERVOUS SYSTEM:  Alert & Oriented X3  EXTREMITIES:  + edema  LYMPH: No lymphadenopathy noted  SKIN: No rashes or lesions  ENDOCRINOLOGY: No Thyromegaly  PSYCH: Appropriate    Labs:  36, 39                            8.9    6.97  )-----------( 140      ( 29 Oct 2022 09:27 )             31.4                         8.0    6.63  )-----------( 141      ( 28 Oct 2022 05:20 )             28.4                         8.0    7.61  )-----------( 222      ( 26 Oct 2022 05:15 )             28.3     10-29    154<H>  |  110<H>  |  24<H>  ----------------------------<  148<H>  3.7   |  35<H>  |  0.94  10-28    147<H>  |  105  |  26<H>  ----------------------------<  119<H>  3.4<L>   |  34<H>  |  0.98  10-28    143  |  103  |  27<H>  ----------------------------<  240<H>  3.2<L>   |  32<H>  |  0.92  10-28    136  |  96<L>  |  26<H>  ----------------------------<  519<HH>  2.9<LL>   |  33<H>  |  0.89  10-27    154<H>  |  110<H>  |  31<H>  ----------------------------<  175<H>  4.1   |  35<H>  |  1.00  10-27    155<H>  |  109<H>  |  34<H>  ----------------------------<  252<H>  3.3<L>   |  36<H>  |  0.97  10-27    154<H>  |  110<H>  |  36<H>  ----------------------------<  252<H>  3.7   |  33<H>  |  1.06  10-27    154<H>  |  111<H>  |  40<H>  ----------------------------<  338<H>  3.3<L>   |  34<H>  |  1.11  10-26    157<H>  |  112<H>  |  40<H>  ----------------------------<  280<H>  3.6   |  34<H>  |  1.04  10-26    139  |  99  |  37<H>  ----------------------------<  665<HH>  3.0<L>   |  30  |  0.93    Ca    8.3<L>      29 Oct 2022 09:27  Ca    8.0<L>      28 Oct 2022 23:09  Ca    7.8<L>      28 Oct 2022 11:49  Ca    7.5<L>      28 Oct 2022 05:20  Ca    8.2<L>      27 Oct 2022 22:00  Phos  3.0     10-29  Phos  2.7     10-28  Phos  2.6     10-28  Phos  3.6     10-27  Phos  2.4     10-27  Mg     1.80     10-29  Mg     1.60     10-28  Mg     1.50     10-28  Mg     1.70     10-27  Mg     1.70     10-27      CAPILLARY BLOOD GLUCOSE      POCT Blood Glucose.: 177 mg/dL (29 Oct 2022 12:49)  POCT Blood Glucose.: 193 mg/dL (29 Oct 2022 08:35)  POCT Blood Glucose.: 96 mg/dL (28 Oct 2022 21:35)  POCT Blood Glucose.: 202 mg/dL (28 Oct 2022 17:27)        PT/INR - ( 29 Oct 2022 09:27 )   PT: 18.3 sec;   INR: 1.57 ratio         PTT - ( 28 Oct 2022 05:20 )  PTT:33.4 sec    D-Dimer Assay, Quantitative: 399 ng/mL DDU (10-25 @ 01:05)        RECENT CULTURES:  10-25 @ 09:51 Clean Catch Clean Catch (Midstream)            rad< from: Xray Chest 1 View- PORTABLE-Routine (Xray Chest 1 View- PORTABLE-Routine in AM.) (10.28.22 @ 05:56) >    HISTORY: Follow-up pleural effusion    COMPARISON STUDY: 10/27/2022    Frontal expiratory view of thechest shows the heart to be similar in   size. Left cardiac pacemaker is again noted.    The lungs show progression of left pleural effusion with partial clearing   of the right base and there is no evidence of pneumothorax nor right   pleural effusion.    IMPRESSION:  Progression of left effusion.        Thank you for the courtesy of this referral.    --- End of Report ---            HORACIO HELMS MD; Attending Interventional Radiologist  This document has been electronically signed. Oct 28 2022  1:38PM    < end of copied text >      >=3 organisms. Probable collection contamination.          RESPIRATORY CULTURES:          Studies  Chest X-RAY  CT SCAN Chest   Venous Dopplers: LE:   CT Abdomen  Others

## 2022-10-29 NOTE — PROGRESS NOTE ADULT - ASSESSMENT
88 y/o M w/ left foot wounds  - Pt seen and evaluated  - Afebrile, no leukocytosis  - Punctate posterior heel wound to level of subq with fibrotic base, no undermining or tracking, no purulence or malodor, lateral 5th MPJ punctate wound to level of capsule with fibrotic base, mild periwound erythema, no drainage or clinical signs of infection. Right AKA.  - No wound culture obtained as it would likely yield skin contaminant  - L foot XR: No OM, no tracking soft tissue air  - Offload L heel with ZFLOW boot at all times  - Pod plan LWC  - Per daughter, pt and family are in agreement w/ vasc (Dr. Quinn) to not pursue any further intervention for revascularization of LLE, will forego ALMAZ/PVR at this time in the absence of tripp gangrene/ischemia  - Will follow

## 2022-10-29 NOTE — PROGRESS NOTE ADULT - ASSESSMENT
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

## 2022-10-29 NOTE — PROGRESS NOTE ADULT - SUBJECTIVE AND OBJECTIVE BOX
[facility] - S [facility] / Ricci S /D Nora/ S Antonia/ SHAHANA Jang/ Jeremi Lee / KENDALL Haynesu/ O Antwon  service -[phone], office [phone]  ---------------------------------------------------------------------------------------------------------------    Patient seen and examined bedside    Subjective and Objective: No overnight events, sob resolved. No complaints today. feeling better    Allergies: No Known Allergies      Hospital Medications:   MEDICATIONS  (STANDING):  aspirin enteric coated 81 milliGRAM(s) Oral daily  atorvastatin 80 milliGRAM(s) Oral at bedtime  BACItracin   Ointment 1 Application(s) Topical daily  buDESOnide    Inhalation Suspension 0.5 milliGRAM(s) Inhalation every 12 hours  collagenase Ointment 1 Application(s) Topical daily  dextrose 5%. 1000 milliLiter(s) (75 mL/Hr) IV Continuous <Continuous>  dextrose 5%. 1000 milliLiter(s) (100 mL/Hr) IV Continuous <Continuous>  dextrose 5%. 1000 milliLiter(s) (50 mL/Hr) IV Continuous <Continuous>  dextrose 50% Injectable 25 Gram(s) IV Push once  dextrose 50% Injectable 12.5 Gram(s) IV Push once  dextrose 50% Injectable 25 Gram(s) IV Push once  enoxaparin Injectable 90 milliGRAM(s) SubCutaneous every 12 hours  finasteride 5 milliGRAM(s) Oral daily  glucagon  Injectable 1 milliGRAM(s) IntraMuscular once  insulin glargine Injectable (LANTUS) 10 Unit(s) SubCutaneous at bedtime  insulin lispro (ADMELOG) corrective regimen sliding scale   SubCutaneous three times a day before meals  insulin lispro (ADMELOG) corrective regimen sliding scale   SubCutaneous at bedtime  insulin lispro Injectable (ADMELOG) 6 Unit(s) SubCutaneous three times a day before meals  levothyroxine Injectable 20 MICROGram(s) IV Push at bedtime  metoprolol tartrate 50 milliGRAM(s) Oral two times a day  montelukast 10 milliGRAM(s) Oral daily  pantoprazole  Injectable 40 milliGRAM(s) IV Push every 24 hours  polyethylene glycol 3350 17 Gram(s) Oral every 12 hours  tamsulosin 0.4 milliGRAM(s) Oral at bedtime    VITALS:  T(F): 97.8 (10-29-22 @ 17:15), Max: 98.2 (10-29-22 @ 01:37)  HR: 71 (10-29-22 @ 17:15)  BP: 133/66 (10-29-22 @ 17:15)  RR: 18 (10-29-22 @ 17:15)  SpO2: 95% (10-29-22 @ 17:15)  Wt(kg): --    10-29 @ 07:01  -  10-29 @ 17:45  --------------------------------------------------------  IN: 377 mL / OUT: 800 mL / NET: -423 mL      PHYSICAL EXAM:  Constitutional: NAD  HEENT: anicteric sclera  Neck: No JVD  Respiratory: CTAB, no wheezes, rales or rhonchi  Cardiovascular: S1, S2, RRR  Gastrointestinal: BS+, soft, NT/ND  Extremities: no pedal edema b/l   Neurological: A/O   Psychiatric: Normal mood, normal affect  : + hudson.     LABS:  10-29    154<H>  |  110<H>  |  24<H>  ----------------------------<  148<H>  3.7   |  35<H>  |  0.94    Ca    8.3<L>      29 Oct 2022 09:27  Phos  3.0     10-29  Mg     1.80     10-29      Creatinine Trend: 0.94 <--, 0.98 <--, 0.92 <--, 0.89 <--, 1.00 <--, 0.97 <--, 1.06 <--, 1.11 <--, 1.04 <--, 0.93 <--, 1.08 <--, 0.96 <--, 0.98 <--                        8.9    6.97  )-----------( 140      ( 29 Oct 2022 09:27 )             31.4     Urine Studies:  Urinalysis Basic - ( 25 Oct 2022 07:30 )    Color: Light Yellow / Appearance: Slightly Turbid / S.012 / pH:   Gluc:  / Ketone: Trace  / Bili: Negative / Urobili: <2 mg/dL   Blood:  / Protein: Trace / Nitrite: Negative   Leuk Esterase: Large / RBC: 2 /HPF / WBC 99 /HPF   Sq Epi:  / Non Sq Epi: 1 /HPF / Bacteria: Many          RADIOLOGY & ADDITIONAL STUDIES:   [facility] - [facility] / Ricci S /D Nora/ S Antonia/ S Suhail/ Jeremi Lee / KENDALL Haynesu/ O Antwon  service -[phone], office [phone]  ---------------------------------------------------------------------------------------------------------------    Patient seen and examined bedside    Subjective and Objective: No overnight events, No complaints today.     Allergies: No Known Allergies      Hospital Medications:   MEDICATIONS  (STANDING):  aspirin enteric coated 81 milliGRAM(s) Oral daily  atorvastatin 80 milliGRAM(s) Oral at bedtime  BACItracin   Ointment 1 Application(s) Topical daily  buDESOnide    Inhalation Suspension 0.5 milliGRAM(s) Inhalation every 12 hours  collagenase Ointment 1 Application(s) Topical daily  dextrose 5%. 1000 milliLiter(s) (75 mL/Hr) IV Continuous <Continuous>  dextrose 5%. 1000 milliLiter(s) (100 mL/Hr) IV Continuous <Continuous>  dextrose 5%. 1000 milliLiter(s) (50 mL/Hr) IV Continuous <Continuous>  dextrose 50% Injectable 25 Gram(s) IV Push once  dextrose 50% Injectable 12.5 Gram(s) IV Push once  dextrose 50% Injectable 25 Gram(s) IV Push once  enoxaparin Injectable 90 milliGRAM(s) SubCutaneous every 12 hours  finasteride 5 milliGRAM(s) Oral daily  glucagon  Injectable 1 milliGRAM(s) IntraMuscular once  insulin glargine Injectable (LANTUS) 10 Unit(s) SubCutaneous at bedtime  insulin lispro (ADMELOG) corrective regimen sliding scale   SubCutaneous three times a day before meals  insulin lispro (ADMELOG) corrective regimen sliding scale   SubCutaneous at bedtime  insulin lispro Injectable (ADMELOG) 6 Unit(s) SubCutaneous three times a day before meals  levothyroxine Injectable 20 MICROGram(s) IV Push at bedtime  metoprolol tartrate 50 milliGRAM(s) Oral two times a day  montelukast 10 milliGRAM(s) Oral daily  pantoprazole  Injectable 40 milliGRAM(s) IV Push every 24 hours  polyethylene glycol 3350 17 Gram(s) Oral every 12 hours  tamsulosin 0.4 milliGRAM(s) Oral at bedtime    VITALS:  T(F): 97.8 (10-29-22 @ 17:15), Max: 98.2 (10-29-22 @ 01:37)  HR: 71 (10-29-22 @ 17:15)  BP: 133/66 (10-29-22 @ 17:15)  RR: 18 (10-29-22 @ 17:15)  SpO2: 95% (10-29-22 @ 17:15)  Wt(kg): --    10-29 @ 07:01  -  10-29 @ 17:45  --------------------------------------------------------  IN: 377 mL / OUT: 800 mL / NET: -423 mL      PHYSICAL EXAM:  Constitutional: NAD  HEENT: anicteric sclera  Neck: No JVD  Respiratory: CTAB, no wheezes, rales or rhonchi  Cardiovascular: S1, S2, RRR  Gastrointestinal: BS+, soft, NT/ND  Extremities: + pedal edema b/l   Neurological: awake  Psychiatric: Normal mood, normal affect  : + hudson.     LABS:  10-29    154<H>  |  110<H>  |  24<H>  ----------------------------<  148<H>  3.7   |  35<H>  |  0.94    Ca    8.3<L>      29 Oct 2022 09:27  Phos  3.0     10-29  Mg     1.80     10-29      Creatinine Trend: 0.94 <--, 0.98 <--, 0.92 <--, 0.89 <--, 1.00 <--, 0.97 <--, 1.06 <--, 1.11 <--, 1.04 <--, 0.93 <--, 1.08 <--, 0.96 <--, 0.98 <--                        8.9    6.97  )-----------( 140      ( 29 Oct 2022 09:27 )             31.4     Urine Studies:  Urinalysis Basic - ( 25 Oct 2022 07:30 )    Color: Light Yellow / Appearance: Slightly Turbid / S.012 / pH:   Gluc:  / Ketone: Trace  / Bili: Negative / Urobili: <2 mg/dL   Blood:  / Protein: Trace / Nitrite: Negative   Leuk Esterase: Large / RBC: 2 /HPF / WBC 99 /HPF   Sq Epi:  / Non Sq Epi: 1 /HPF / Bacteria: Many    RADIOLOGY & ADDITIONAL STUDIES:  < from: Xray Chest 1 View- PORTABLE-Routine (Xray Chest 1 View- PORTABLE-Routine in AM.) (10.28.22 @ 05:56) >  IMPRESSION:  Progression of left effusion.    < end of copied text >

## 2022-10-29 NOTE — PROGRESS NOTE ADULT - ASSESSMENT
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     Hypernatremia  The patient appears hypervolemic  Agreed with Lasix 40mg IV BID  Agreed with IV D5W with continual diuresis to promote natreuresis  sodium crop noted along with all lytes and high glucose, most likely due to blood draw diluted with IV D5W, please redraw BMP, agree with holding IVF for now  BMP q12hrs for now    B/L Pleural effusions + Left lung collapse  Per thoracic surgery      For any question, call:  Cell # [phone]  Pager # [phone]  Callback # [phone] 87 year old M hx of severe systolic CHF, b/l pleural effusion s/p thoracentesis in the past, SSS w/ pacemaker w/ f/u w/ EP, 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 Renal following for Hypernatremia Mx    Hypernatremia 2/2 free water deficit -  Na 143>147 >154     s/p Lasix 40mg IV BID-held today 2/2 worsening Na  Agree with IVF D5W @75ml/hr x12hrs, reassess tomorrow  on pureed diet   BMP qdaily    B/L Pleural effusions + Left lung collapse  Per thoracic surgery  awaiting drainage    will closely follow up.   labs, rad, chart reviewed  For any question, pl call:  Nephrology  Cell -[phone]  Office [phone]  Ans Serv [phone]

## 2022-10-29 NOTE — CHART NOTE - NSCHARTNOTEFT_GEN_A_CORE
Vital Signs Last 24 Hrs  T(C): 36.7 (29 Oct 2022 09:30), Max: 36.8 (29 Oct 2022 01:37)  T(F): 98.1 (29 Oct 2022 09:30), Max: 98.2 (29 Oct 2022 01:37)  HR: 99 (29 Oct 2022 09:30) (72 - 113)  BP: 114/66 (29 Oct 2022 09:30) (108/56 - 128/71)  BP(mean): --  RR: 18 (29 Oct 2022 09:30) (18 - 18)  SpO2: 100% (29 Oct 2022 09:30) (95% - 100%)    Parameters below as of 29 Oct 2022 09:30  Patient On (Oxygen Delivery Method): nasal cannula  O2 Flow (L/min): 3                          8.9    6.97  )-----------( 140      ( 29 Oct 2022 09:27 )             31.4   10-29    154<H>  |  110<H>  |  24<H>  ----------------------------<  148<H>  3.7   |  35<H>  |  0.94    Ca    8.3<L>      29 Oct 2022 09:27  Phos  3.0     10-29  Mg     1.80     10-29    Spoke with Nephro Dr. Kapoor Na 154, recommended to start D5 @75cc [id number] and repeat BMP tonight. As per Cards holding IV lasix for now. Results and case discussed Dr. Heller, therapeutic lovenox 90mg BID ordered per MD recs, INR 1.57 today. Will closely continue to monitor

## 2022-10-29 NOTE — PROGRESS NOTE ADULT - NSPROGADDITIONALINFOA_GEN_ALL_CORE
[Patient Intake Form Reviewed] : Patient intake form was reviewed [As Noted in HPI] : as noted in HPI [Negative] : Heme/Lymph d/w pt and NP.  d/w card and renal.    # Hypernatremia  Na improving, edema slightly worse  D5W discontinued  edema better but Na now worse  restarted D5W. +/- diuretics per renal     - Dr. NOEL Htet ([facility])  - [phone]

## 2022-10-29 NOTE — PROGRESS NOTE ADULT - ASSESSMENT
TTE with Doppler (w/Cont) (04.03.22 @ 15:07) >  mild aortic stenosis. . Mild left ventricular systolic dysfunction. The inferior wall, and the inferoseptum are hypokinetic.  Echo 9/15/22: .EF 35-40% Severe segmental left ventricular systolic dysfunction. The mid to distal anteroseptum and severely hypokinetic. The mid to basal inferolateral wall is hypokinetic.      a/p  86 yo male pmhx BPH, PAD s/p R BKA, COPD (not on home O2), HTN, HLD, afib, PPM ([employer])  CVA w/ residual L hemiparesis on coumadin, insulin-dependent diabetes, sys CHF on lasix presents with shortness of breath    #SOB   -secondary to chf and large left effusuion  -pt initially off BIPAP now on NC  -cont IV Lasix as ordered  -prior cta chest with no pe, Moderate left and small right pleural effusions, increased since May  2022, with worsening lower lobe compressive atelectasis. New right lung patchy and nodular areas of consolidation, likely  infectious/inflammatory  -Pulm/Med/thoracic  f/u  -recent echo with .EF 35-40% Severe segmental left ventricular systolic dysfunction. The mid to distal anteroseptum and severely hypokinetic. The mid to basal inferolateral wall is hypokinetic.  -await OR with thoracic surgery next week     # acute on chronic systolic CHF   -stable,  -hold diuretics today, hypernatremia noted  -repeat echo with moderate severe lv dysfxn, unchanged from echo  4/2022  -continue medical management of CMP/HF  -ecg, no ischemic changes, HS trop elevated secondary to hypoxia/ chf/ resp infection   -c/w bb     #Pafib, sp PPM   -c/w BB  -off oral ac/, inr > 2     #mild as   -echo stable     #Abnl UA  -abx per med      #hypernatremia   -renal f/u    35 minutes spent on total encounter; more than 50% of the visit was spent counseling and/or coordinating care by the attending physician.

## 2022-10-29 NOTE — PROGRESS NOTE ADULT - SUBJECTIVE AND OBJECTIVE BOX
SUBJECTIVE/ OVERNIGHT EVENTS:  Na rising  D5W restarted  awake alert  no cp, no sob, no n/v/d. no abdominal pain.  no headache, no dizziness.   left foot pain, podiatry seeing  conservative management per discussion between podiatry/vascular and the family  INR <2, Lovenox SQ BID started.     --------------------------------------------------------------------------------------------  LABS:                        8.9    6.97  )-----------( 140      ( 29 Oct 2022 09:27 )             31.4     10-29    154<H>  |  110<H>  |  24<H>  ----------------------------<  148<H>  3.7   |  35<H>  |  0.94    Ca    8.3<L>      29 Oct 2022 09:27  Phos  3.0     10-29  Mg     1.80     10-29      PT/INR - ( 29 Oct 2022 09:27 )   PT: 18.3 sec;   INR: 1.57 ratio         PTT - ( 28 Oct 2022 05:20 )  PTT:33.4 sec  CAPILLARY BLOOD GLUCOSE      POCT Blood Glucose.: 99 mg/dL (29 Oct 2022 17:49)  POCT Blood Glucose.: 177 mg/dL (29 Oct 2022 12:49)  POCT Blood Glucose.: 193 mg/dL (29 Oct 2022 08:35)  POCT Blood Glucose.: 96 mg/dL (28 Oct 2022 21:35)            RADIOLOGY & ADDITIONAL TESTS:    Imaging Personally Reviewed:  [x] YES  [ ] NO    Consultant(s) Notes Reviewed:  [x] YES  [ ] NO    MEDICATIONS  (STANDING):  aspirin enteric coated 81 milliGRAM(s) Oral daily  atorvastatin 80 milliGRAM(s) Oral at bedtime  BACItracin   Ointment 1 Application(s) Topical daily  buDESOnide    Inhalation Suspension 0.5 milliGRAM(s) Inhalation every 12 hours  collagenase Ointment 1 Application(s) Topical daily  dextrose 5%. 1000 milliLiter(s) (75 mL/Hr) IV Continuous <Continuous>  dextrose 5%. 1000 milliLiter(s) (100 mL/Hr) IV Continuous <Continuous>  dextrose 5%. 1000 milliLiter(s) (50 mL/Hr) IV Continuous <Continuous>  dextrose 50% Injectable 25 Gram(s) IV Push once  dextrose 50% Injectable 12.5 Gram(s) IV Push once  dextrose 50% Injectable 25 Gram(s) IV Push once  enoxaparin Injectable 90 milliGRAM(s) SubCutaneous every 12 hours  finasteride 5 milliGRAM(s) Oral daily  glucagon  Injectable 1 milliGRAM(s) IntraMuscular once  insulin glargine Injectable (LANTUS) 10 Unit(s) SubCutaneous at bedtime  insulin lispro (ADMELOG) corrective regimen sliding scale   SubCutaneous three times a day before meals  insulin lispro (ADMELOG) corrective regimen sliding scale   SubCutaneous at bedtime  insulin lispro Injectable (ADMELOG) 6 Unit(s) SubCutaneous three times a day before meals  levothyroxine Injectable 20 MICROGram(s) IV Push at bedtime  metoprolol tartrate 50 milliGRAM(s) Oral two times a day  montelukast 10 milliGRAM(s) Oral daily  pantoprazole  Injectable 40 milliGRAM(s) IV Push every 24 hours  polyethylene glycol 3350 17 Gram(s) Oral every 12 hours  tamsulosin 0.4 milliGRAM(s) Oral at bedtime    MEDICATIONS  (PRN):  acetaminophen     Tablet .. 650 milliGRAM(s) Oral every 6 hours PRN Mild Pain (1 - 3), Moderate Pain (4 - 6)  albuterol/ipratropium for Nebulization 3 milliLiter(s) Nebulizer every 6 hours PRN Shortness of Breath and/or Wheezing  dextrose Oral Gel 15 Gram(s) Oral once PRN Blood Glucose LESS THAN 70 milliGRAM(s)/deciliter      Care Discussed with Consultants/Other Providers [x] YES  [ ] NO    Vital Signs Last 24 Hrs  T(C): 36.6 (29 Oct 2022 17:15), Max: 36.8 (29 Oct 2022 01:37)  T(F): 97.8 (29 Oct 2022 17:15), Max: 98.2 (29 Oct 2022 01:37)  HR: 71 (29 Oct 2022 17:15) (71 - 113)  BP: 133/66 (29 Oct 2022 17:15) (108/56 - 133/66)  BP(mean): --  RR: 18 (29 Oct 2022 17:15) (18 - 18)  SpO2: 95% (29 Oct 2022 17:15) (95% - 100%)    Parameters below as of 29 Oct 2022 17:15  Patient On (Oxygen Delivery Method): nasal cannula  O2 Flow (L/min): 3    I&O's Summary    29 Oct 2022 07:01  -  29 Oct 2022 18:24  --------------------------------------------------------  IN: 377 mL / OUT: 800 mL / NET: -423 mL        PHYSICAL EXAM:  GENERAL: NAD, well-developed, obese man, comfortable on nasal canula  HEAD:  Atraumatic, Normocephalic  EYES: EOMI, PERRLA, conjunctiva and sclera clear  NECK: Supple, No JVD  CHEST/LUNG: mild decrease breath sounds bilaterally; No wheeze   HEART: Irregular rate and rhythm; No murmurs, rubs, or gallops  ABDOMEN: Soft, Nontender, Nondistended; Bowel sounds present  : [place] in place, britney color urine, neg CVAT   Neuro: AAOx3, no focal weakness, mild left UE weakness baseline   EXTREMITIES:  + Peripheral Pulses, No clubbing, cyanosis, + edema, right AKA, left foot dressing d/c/i  SKIN: No rashes or lesions    normal (ped)...

## 2022-10-29 NOTE — PROGRESS NOTE ADULT - ASSESSMENT
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

## 2022-10-29 NOTE — PROGRESS NOTE ADULT - SUBJECTIVE AND OBJECTIVE BOX
Patient is a 87y old  Male who presents with a chief complaint of Acute hypoxic respiratory failure (29 Oct 2022 10:41)       INTERVAL HPI/OVERNIGHT EVENTS:  Patient seen and evaluated at bedside.  Pt is resting comfortable in NAD. Denies N/V/F/C.  Pain rated at X/10    Allergies    No Known Allergies    Intolerances        Vital Signs Last 24 Hrs  T(C): 36.7 (29 Oct 2022 09:30), Max: 36.8 (29 Oct 2022 01:37)  T(F): 98.1 (29 Oct 2022 09:30), Max: 98.2 (29 Oct 2022 01:37)  HR: 99 (29 Oct 2022 09:30) (72 - 113)  BP: 114/66 (29 Oct 2022 09:30) (108/56 - 128/71)  BP(mean): --  RR: 18 (29 Oct 2022 09:30) (18 - 18)  SpO2: 100% (29 Oct 2022 09:30) (95% - 100%)    Parameters below as of 29 Oct 2022 09:30  Patient On (Oxygen Delivery Method): nasal cannula  O2 Flow (L/min): 3      LABS:                        8.9    6.97  )-----------( 140      ( 29 Oct 2022 09:27 )             31.4     10-29    154<H>  |  110<H>  |  24<H>  ----------------------------<  148<H>  3.7   |  35<H>  |  0.94    Ca    8.3<L>      29 Oct 2022 09:27  Phos  3.0     10-29  Mg     1.80     10-29      PT/INR - ( 29 Oct 2022 09:27 )   PT: 18.3 sec;   INR: 1.57 ratio         PTT - ( 28 Oct 2022 05:20 )  PTT:33.4 sec    CAPILLARY BLOOD GLUCOSE      POCT Blood Glucose.: 193 mg/dL (29 Oct 2022 08:35)  POCT Blood Glucose.: 96 mg/dL (28 Oct 2022 21:35)  POCT Blood Glucose.: 202 mg/dL (28 Oct 2022 17:27)  POCT Blood Glucose.: 244 mg/dL (28 Oct 2022 12:00)      Lower Extremity Physical Exam:  s/p RLE BKA amputation  Vasular: DP/PT 0/4, L, CFT < 3 seconds L, Temperature gradient warm to cool, L.   Neuro: Epicritic sensation diminished to the level of the toes, L.  Musculoskeletal/Ortho: R BKA  Skin: punctate posterior heel wound to level of subq with fibrotic base, no undermining or tracking, no purulence or malodor, lateral 5th MPJ punctate wound to level of capsule with fibrotic base, mild periwound erythema, no drainage or clinical signs of infection. Right BKA.    RADIOLOGY & ADDITIONAL TESTS:

## 2022-10-29 NOTE — PROGRESS NOTE ADULT - PROBLEM SELECTOR PLAN 1
events noted: seen by thoracic surgery  : was supposed to get pleurl effusion drained today  : but labs needed to be optimized:  now due for tomorrow: he recently had thoracenteses  done on both sides at [facility]:  has poor LV function:  currently on diuresis    10/27: diuresis being done : no chest tube done as he is not optimized for the OR yet:  appreciate thoracic consult:  cont diuresis for now:  chest xray today seems  with better oxygenation    10/28: he seems to doing  ok ; no sob:  has large effusion on the  left side and he was recently tapped at [facility]:  has severe LV dysfunction:: pl for drainage on Monday : haley acp thoracic    10/29: still has large effusion on left side:  awaiting procedure on left side

## 2022-10-29 NOTE — PROGRESS NOTE ADULT - SUBJECTIVE AND OBJECTIVE BOX
CARDIOLOGY FOLLOW UP - Dr. Stephens  Date of Service: 10/29/2022  CC: no new complaints    Review of Systems:  Constitutional: No fever, weight loss, or fatigue  Respiratory: No cough, wheezing, or hemoptysis, no shortness of breath  Cardiovascular: No chest pain, palpitations, passing out, dizziness, or leg swelling  Gastrointestinal: No abd or epigastric pain. No nausea, vomiting, or hematemesis; no diarrhea or consiptaiton, no melena or hematochezia  Vascular: No edema     TELEMETRY:    PHYSICAL EXAM:  T(C): 36.7 (10-29-22 @ 09:30), Max: 36.8 (10-29-22 @ 01:37)  HR: 99 (10-29-22 @ 09:30) (72 - 113)  BP: 114/66 (10-29-22 @ 09:30) (108/56 - 128/71)  RR: 18 (10-29-22 @ 09:30) (18 - 18)  SpO2: 100% (10-29-22 @ 09:30) (95% - 100%)  Wt(kg): --  I&O's Summary      Appearance: Normal	  Cardiovascular: Normal S1 S2,RRR, No JVD, No murmurs  Respiratory: Lungs clear to auscultation	  Gastrointestinal:  Soft, Non-tender, + BS	  Extremities: Normal range of motion, No clubbing, cyanosis or edema  Vascular: Peripheral pulses palpable 2+ bilaterally       Home Medications:  acetaminophen 325 mg oral tablet: 2 tab(s) orally every 6 hours, As needed, Moderate Pain (4 - 6) (23 Sep 2022 16:43)  acetylcysteine 20% inhalation solution: 4 milliliter(s) inhaled 4 times a day as needed (25 Oct 2022 10:37)  acetylcysteine 20% inhalation solution: 4 milliliter(s) inhaled 3 times a day (23 Sep 2022 16:43)  aspirin 81 mg oral delayed release tablet: 1 tab(s) orally once a day (23 Sep 2022 16:43)  Aspirin Enteric Coated 81 mg oral delayed release tablet: 1 tab(s) orally once a day (25 Oct 2022 10:37)  atorvastatin 80 mg oral tablet: 1 tab(s) orally once a day (at bedtime) (23 Sep 2022 16:43)  bacitracin 500 units/g topical ointment: Apply topically to affected area twice to back (25 Oct 2022 10:37)  budesonide 0.5 mg/2 mL inhalation suspension: 0.5 milligram(s) inhaled 2 times a day (14 Sep 2022 19:13)  budesonide 0.5 mg/2 mL inhalation suspension: 2 milliliter(s) inhaled 2 times a day (25 Oct 2022 10:37)  cadexomer iodine 0.9% topical gel: 1 application topically once a day (23 Sep 2022 16:43)  Coumadin 3 mg oral tablet: 1 tab(s) orally once a day (at bedtime) (23 Sep 2022 16:43)  Coumadin 4 mg oral tablet: 1 tab(s) orally once a day (25 Oct 2022 10:37)  finasteride 5 mg oral tablet: 1 tab(s) orally once a day (14 Sep 2022 19:13)  finasteride 5 mg oral tablet: 1 tab(s) orally once a day (25 Oct 2022 10:37)  Flomax 0.4 mg oral capsule: 1 cap(s) orally once a day (25 Oct 2022 10:37)  gabapentin 100 mg oral capsule: 1 tab(s) orally 2 times a day (25 Oct 2022 10:37)  gabapentin 100 mg oral tablet: 1 tab(s) orally 2 times a day (14 Sep 2022 19:13)  guaifenesin-dextromethorphan 100 mg-10 mg/5 mL oral liquid: 10 milliliter(s) orally every 6 hours (23 Sep 2022 16:43)  insulin glargine 100 units/mL subcutaneous solution: 10 unit(s) subcutaneous once a day (at bedtime) (23 Sep 2022 16:43)  insulin lispro 100 units/mL injectable solution: 14 unit(s) injectable once a day before dinner (23 Sep 2022 18:00)  insulin lispro 100 units/mL injectable solution: 14 unit(s) injectable once a day before lunch (23 Sep 2022 18:00)  insulin lispro 100 units/mL injectable solution: 20 unit(s) injectable once a day before Breakfast (23 Sep 2022 18:00)  insulin lispro 100 units/mL subcutaneous solution: 14 unit(s) subcutaneous before lunch and dinner (25 Oct 2022 10:37)  Iodosorb 0.9% topical gel: to heel (25 Oct 2022 10:37)  ipratropium: inhalation as needed (25 Oct 2022 10:37)  ipratropium-albuterol 0.5 mg-2.5 mg/3 mL inhalation solution: 3 milliliter(s) inhaled every 6 hours (23 Sep 2022 16:43)  Lantus 100 units/mL subcutaneous solution: 10 unit(s) subcutaneous once a day (at bedtime) (25 Oct 2022 10:37)  Lasix 20 mg oral tablet: 1 tab(s) orally once a day (25 Oct 2022 10:37)  Lipitor 80 mg oral tablet: 1 tab(s) orally once a day (25 Oct 2022 10:37)  medihoney:  (25 Oct 2022 10:37)  metoprolol tartrate 50 mg oral tablet: 1 tab(s) orally 2 times a day (23 Sep 2022 16:43)  Metoprolol Tartrate 50 mg oral tablet: 1 tab(s) orally 2 times a day (25 Oct 2022 10:37)  montelukast 10 mg oral tablet: 1 tab(s) orally once a day (at bedtime) (23 Sep 2022 16:43)  Multiple Vitamins with Minerals oral tablet: 1 tab(s) orally once a day (23 Sep 2022 16:43)  pantoprazole 40 mg oral delayed release tablet: 1 tab(s) orally once a day (before a meal) (23 Sep 2022 16:43)  polyethylene glycol 3350 oral powder for reconstitution: 17 gram(s) orally once a day (23 Sep 2022 16:43)  predniSONE 50 mg oral tablet: 1 tab(s) orally once a day for one more dose on 9/24/22 then D/C (23 Sep 2022 16:43)  Protonix 40 mg oral delayed release tablet: 1 tab(s) orally once a day (25 Oct 2022 10:37)  senna leaf extract oral tablet: 2 tab(s) orally once a day (at bedtime) (23 Sep 2022 16:43)  Singulair 10 mg oral tablet: 1 tab(s) orally once a day (25 Oct 2022 10:37)  sodium chloride 3% inhalation solution: 4 milliliter(s) inhaled every 12 hours (23 Sep 2022 16:43)  Synthroid 25 mcg (0.025 mg) oral tablet: 1 tab(s) orally once a day (25 Oct 2022 10:37)  Synthroid 25 mcg (0.025 mg) oral tablet: 1 tab(s) orally once a day (14 Sep 2022 19:13)  tamsulosin 0.4 mg oral capsule: 2 cap(s) orally once a day (at bedtime) (14 Sep 2022 19:13)  zinc oxide topical cream: Apply topically to affected area twice to AKA (25 Oct 2022 10:37)        MEDICATIONS  (STANDING):  aspirin enteric coated 81 milliGRAM(s) Oral daily  atorvastatin 80 milliGRAM(s) Oral at bedtime  BACItracin   Ointment 1 Application(s) Topical daily  buDESOnide    Inhalation Suspension 0.5 milliGRAM(s) Inhalation every 12 hours  collagenase Ointment 1 Application(s) Topical daily  dextrose 5%. 1000 milliLiter(s) (50 mL/Hr) IV Continuous <Continuous>  dextrose 5%. 1000 milliLiter(s) (100 mL/Hr) IV Continuous <Continuous>  dextrose 50% Injectable 25 Gram(s) IV Push once  dextrose 50% Injectable 12.5 Gram(s) IV Push once  dextrose 50% Injectable 25 Gram(s) IV Push once  enoxaparin Injectable 80 milliGRAM(s) SubCutaneous every 12 hours  finasteride 5 milliGRAM(s) Oral daily  furosemide   Injectable 40 milliGRAM(s) IV Push every 12 hours  glucagon  Injectable 1 milliGRAM(s) IntraMuscular once  insulin glargine Injectable (LANTUS) 10 Unit(s) SubCutaneous at bedtime  insulin lispro (ADMELOG) corrective regimen sliding scale   SubCutaneous three times a day before meals  insulin lispro (ADMELOG) corrective regimen sliding scale   SubCutaneous at bedtime  insulin lispro Injectable (ADMELOG) 6 Unit(s) SubCutaneous three times a day before meals  levothyroxine Injectable 20 MICROGram(s) IV Push at bedtime  metoprolol tartrate 50 milliGRAM(s) Oral two times a day  montelukast 10 milliGRAM(s) Oral daily  pantoprazole  Injectable 40 milliGRAM(s) IV Push every 24 hours  polyethylene glycol 3350 17 Gram(s) Oral every 12 hours  tamsulosin 0.4 milliGRAM(s) Oral at bedtime        EKG:  RADIOLOGY:  DIAGNOSTIC TESTING:  [ ] Echocardiogram:  [ ] Catherterization:  [ ] Stress Test:  OTHER:     LABS:	 	                          8.9    6.97  )-----------( 140      ( 29 Oct 2022 09:27 )             31.4     10-29    154<H>  |  110<H>  |  24<H>  ----------------------------<  148<H>  3.7   |  35<H>  |  0.94    Ca    8.3<L>      29 Oct 2022 09:27  Phos  3.0     10-29  Mg     1.80     10-29        PT/INR - ( 29 Oct 2022 09:27 )   PT: 18.3 sec;   INR: 1.57 ratio         PTT - ( 28 Oct 2022 05:20 )  PTT:33.4 sec    CARDIAC MARKERS:

## 2022-10-30 NOTE — CHART NOTE - NSCHARTNOTEFT_GEN_A_CORE
Age: 87y    Gender: Male    Notified by day provider pt hypoglycemic with FS of 50. pt asymptomatic. Hypoglycemia protocol initiated. Per RN pt ate 75% of his meal, D50 had to be given. FS now 186. Will continue to monitor. Pt NPO after midnight for procedure, will make FS q6h while NPO.     POCT Blood Glucose:  186 mg/dL (10-30-22 @ 20:02)  59 mg/dL (10-30-22 @ 19:04)  69 mg/dL (10-30-22 @ 18:04)  50 mg/dL (10-30-22 @ 18:01)  160 mg/dL (10-30-22 @ 12:20)  149 mg/dL (10-30-22 @ 08:45)  288 mg/dL (10-29-22 @ 22:11)      eMAR:atorvastatin   80 milliGRAM(s) Oral (10-29-22 @ 22:50)    dextrose 50% Injectable   25 Gram(s) IV Push (10-30-22 @ 19:35)    finasteride   5 milliGRAM(s) Oral (10-30-22 @ 12:11)    insulin glargine Injectable (LANTUS)   10 Unit(s) SubCutaneous (10-29-22 @ 22:50)    insulin lispro (ADMELOG) corrective regimen sliding scale   1 Unit(s) SubCutaneous (10-30-22 @ 13:14)    insulin lispro (ADMELOG) corrective regimen sliding scale   1 Unit(s) SubCutaneous (10-29-22 @ 22:49)    insulin lispro Injectable (ADMELOG)   6 Unit(s) SubCutaneous (10-30-22 @ 13:14)   6 Unit(s) SubCutaneous (10-30-22 @ 09:05)    levothyroxine Injectable   20 MICROGram(s) IV Push (10-30-22 @ 01:34)

## 2022-10-30 NOTE — PROGRESS NOTE ADULT - NSPROGADDITIONALINFOA_GEN_ALL_CORE
paged acp : awaiting response:    10/28: haley acp and thoracic NP    10/29: paged acp    10/30: haley acp

## 2022-10-30 NOTE — PROGRESS NOTE ADULT - SUBJECTIVE AND OBJECTIVE BOX
Date of Service: 10-30-22 @ 14:07    Patient is a 87y old  Male who presents with a chief complaint of Acute hypoxic respiratory failure (30 Oct 2022 09:25)      Any change in ROS: seems OK : no sob:  alert and awke: to me he says he has no sob: :  on room air at this time:     MEDICATIONS  (STANDING):  aspirin enteric coated 81 milliGRAM(s) Oral daily  atorvastatin 80 milliGRAM(s) Oral at bedtime  BACItracin   Ointment 1 Application(s) Topical daily  buDESOnide    Inhalation Suspension 0.5 milliGRAM(s) Inhalation every 12 hours  collagenase Ointment 1 Application(s) Topical daily  dextrose 5%. 1000 milliLiter(s) (100 mL/Hr) IV Continuous <Continuous>  dextrose 5%. 1000 milliLiter(s) (50 mL/Hr) IV Continuous <Continuous>  dextrose 5%. 1000 milliLiter(s) (75 mL/Hr) IV Continuous <Continuous>  dextrose 50% Injectable 25 Gram(s) IV Push once  dextrose 50% Injectable 12.5 Gram(s) IV Push once  dextrose 50% Injectable 25 Gram(s) IV Push once  finasteride 5 milliGRAM(s) Oral daily  glucagon  Injectable 1 milliGRAM(s) IntraMuscular once  insulin glargine Injectable (LANTUS) 7 Unit(s) SubCutaneous at bedtime  insulin lispro (ADMELOG) corrective regimen sliding scale   SubCutaneous three times a day before meals  insulin lispro (ADMELOG) corrective regimen sliding scale   SubCutaneous at bedtime  insulin lispro Injectable (ADMELOG) 6 Unit(s) SubCutaneous three times a day before meals  levothyroxine Injectable 20 MICROGram(s) IV Push at bedtime  metoprolol tartrate 50 milliGRAM(s) Oral two times a day  montelukast 10 milliGRAM(s) Oral daily  pantoprazole  Injectable 40 milliGRAM(s) IV Push every 24 hours  polyethylene glycol 3350 17 Gram(s) Oral every 12 hours  tamsulosin 0.4 milliGRAM(s) Oral at bedtime    MEDICATIONS  (PRN):  acetaminophen     Tablet .. 650 milliGRAM(s) Oral every 6 hours PRN Mild Pain (1 - 3), Moderate Pain (4 - 6)  albuterol/ipratropium for Nebulization 3 milliLiter(s) Nebulizer every 6 hours PRN Shortness of Breath and/or Wheezing  dextrose Oral Gel 15 Gram(s) Oral once PRN Blood Glucose LESS THAN 70 milliGRAM(s)/deciliter    Vital Signs Last 24 Hrs  T(C): 36.4 (30 Oct 2022 13:00), Max: 36.9 (30 Oct 2022 05:01)  T(F): 97.6 (30 Oct 2022 13:00), Max: 98.4 (30 Oct 2022 05:01)  HR: 82 (30 Oct 2022 13:00) (71 - 98)  BP: 145/65 (30 Oct 2022 13:00) (122/64 - 145/65)  BP(mean): --  RR: 18 (30 Oct 2022 13:00) (18 - 18)  SpO2: 98% (30 Oct 2022 13:00) (91% - 98%)    Parameters below as of 30 Oct 2022 13:00  Patient On (Oxygen Delivery Method): room air        I&O's Summary    29 Oct 2022 07:01  -  30 Oct 2022 07:00  --------------------------------------------------------  IN: 577 mL / OUT: 1100 mL / NET: -523 mL    30 Oct 2022 07:01  -  30 Oct 2022 14:07  --------------------------------------------------------  IN: 200 mL / OUT: 0 mL / NET: 200 mL          Physical Exam:   GENERAL: NAD, well-groomed, well-developed  HEENT: PETRONA/   Atraumatic, Normocephalic  ENMT: No tonsillar erythema, exudates, or enlargement; Moist mucous membranes, Good dentition, No lesions  NECK: Supple, No JVD, Normal thyroid  CHEST/LUNG: Crackels : with decreased air entry bilaterally   CVS: Regular rate and rhythm; No murmurs, rubs, or gallops  GI: : Soft, Nontender, Nondistended; Bowel sounds present  NERVOUS SYSTEM:  Alert & Oriented X3  EXTREMITIES:+edema  LYMPH: No lymphadenopathy noted  SKIN: No rashes or lesions  ENDOCRINOLOGY: No Thyromegaly  PSYCH: Appropriate    Labs:  36, 39                            9.0    7.44  )-----------( 137      ( 30 Oct 2022 04:55 )             31.4                         8.9    6.97  )-----------( 140      ( 29 Oct 2022 09:27 )             31.4                         8.0    6.63  )-----------( 141      ( 28 Oct 2022 05:20 )             28.4     10-30    146<H>  |  105  |  22  ----------------------------<  150<H>  3.3<L>   |  35<H>  |  0.89  10-29    154<H>  |  110<H>  |  24<H>  ----------------------------<  148<H>  3.7   |  35<H>  |  0.94  10-28    147<H>  |  105  |  26<H>  ----------------------------<  119<H>  3.4<L>   |  34<H>  |  0.98  10-28    143  |  103  |  27<H>  ----------------------------<  240<H>  3.2<L>   |  32<H>  |  0.92  10-28    136  |  96<L>  |  26<H>  ----------------------------<  519<HH>  2.9<LL>   |  33<H>  |  0.89  10-27    154<H>  |  110<H>  |  31<H>  ----------------------------<  175<H>  4.1   |  35<H>  |  1.00  10-27    155<H>  |  109<H>  |  34<H>  ----------------------------<  252<H>  3.3<L>   |  36<H>  |  0.97  10-27    154<H>  |  110<H>  |  36<H>  ----------------------------<  252<H>  3.7   |  33<H>  |  1.06  10-27    154<H>  |  111<H>  |  40<H>  ----------------------------<  338<H>  3.3<L>   |  34<H>  |  1.11  10-26    157<H>  |  112<H>  |  40<H>  ----------------------------<  280<H>  3.6   |  34<H>  |  1.04    Ca    8.1<L>      30 Oct 2022 04:55  Ca    8.3<L>      29 Oct 2022 09:27  Ca    8.0<L>      28 Oct 2022 23:09  Phos  3.0     10-29  Mg     1.80     10-29      CAPILLARY BLOOD GLUCOSE      POCT Blood Glucose.: 160 mg/dL (30 Oct 2022 12:20)  POCT Blood Glucose.: 149 mg/dL (30 Oct 2022 08:45)  POCT Blood Glucose.: 288 mg/dL (29 Oct 2022 22:11)  POCT Blood Glucose.: 99 mg/dL (29 Oct 2022 17:49)        PT/INR - ( 30 Oct 2022 04:55 )   PT: 15.0 sec;   INR: 1.29 ratio         PTT - ( 30 Oct 2022 04:55 )  PTT:35.6 sec    D-Dimer Assay, Quantitative: 399 ng/mL DDU (10-25 @ 01:05)        RECENT CULTURES:  10-25 @ 09:51 Clean Catch Clean Catch (Midstream)       r< from: Xray Chest 1 View- PORTABLE-Routine (Xray Chest 1 View- PORTABLE-Routine in AM.) (10.28.22 @ 05:56) >  PROCEDURE DATE:  10/28/2022          INTERPRETATION:  Chest one view    HISTORY: Follow-up pleural effusion    COMPARISON STUDY: 10/27/2022    Frontal expiratory view of thechest shows the heart to be similar in   size. Left cardiac pacemaker is again noted.    The lungs show progression of left pleural effusion with partial clearing   of the right base and there is no evidence of pneumothorax nor right   pleural effusion.    IMPRESSION:  Progression of left effusion.        Thank you for the courtesy of this referral.    --- End of Report ---            HORACIO HELMS MD; Attending Interventional Radiologist  This document has been electronically signed. Oct 28 2022  1:38PM    < end of copied text >           >=3 organisms. Probable collection contamination.          RESPIRATORY CULTURES:          Studies  Chest X-RAY  CT SCAN Chest   Venous Dopplers: LE:   CT Abdomen  Others

## 2022-10-30 NOTE — PROGRESS NOTE ADULT - ASSESSMENT
TTE with Doppler (w/Cont) (04.03.22 @ 15:07) >  mild aortic stenosis. . Mild left ventricular systolic dysfunction. The inferior wall, and the inferoseptum are hypokinetic.  Echo 9/15/22: .EF 35-40% Severe segmental left ventricular systolic dysfunction. The mid to distal anteroseptum and severely hypokinetic. The mid to basal inferolateral wall is hypokinetic.      a/p  86 yo male pmhx BPH, PAD s/p R BKA, COPD (not on home O2), HTN, HLD, afib, PPM ([employer])  CVA w/ residual L hemiparesis on coumadin, insulin-dependent diabetes, sys CHF on lasix presents with shortness of breath    #SOB   -secondary to chf and large left effusuion  -pt initially off BIPAP now on NC  -lasix on hold  -prior cta chest with no pe, Moderate left and small right pleural effusions, increased since May  2022, with worsening lower lobe compressive atelectasis. New right lung patchy and nodular areas of consolidation, likely  infectious/inflammatory  -Pulm/Med/thoracic  f/u  -recent echo with .EF 35-40% Severe segmental left ventricular systolic dysfunction. The mid to distal anteroseptum and severely hypokinetic. The mid to basal inferolateral wall is hypokinetic.  -await OR with thoracic surgery next week     # acute on chronic systolic CHF   -stable,  -hold diuretics today, hypernatremia noted  -repeat echo with moderate severe lv dysfxn, unchanged from echo  4/2022  -continue medical management of CMP/HF  -ecg, no ischemic changes, HS trop elevated secondary to hypoxia/ chf/ resp infection   -c/w bb     #Pafib, sp PPM   -c/w BB  -off oral ac/, inr > 2     #mild as   -echo stable     #Abnl UA  -abx per med      #hypernatremia   -renal f/u    35 minutes spent on total encounter; more than 50% of the visit was spent counseling and/or coordinating care by the attending physician.

## 2022-10-30 NOTE — CHART NOTE - NSCHARTNOTEFT_GEN_A_CORE
Subjective:  Pt seen and examined by thoracic team this AM.  Pt AOx4 and in agreement with thoracic surgery tomorrow. Also called daughter Yael and obtained consent for OR procedure.  Pt denies chest pain, abdominal pain, worsening shortness of breath, headache, N/V.    Vital Signs:  Vital Signs Last 24 Hrs  T(C): 36.4 (10-30-22 @ 13:00), Max: 36.9 (10-30-22 @ 05:01)  T(F): 97.6 (10-30-22 @ 13:00), Max: 98.4 (10-30-22 @ 05:01)  HR: 82 (10-30-22 @ 13:00) (71 - 98)  BP: 145/65 (10-30-22 @ 13:00) (122/64 - 145/65)  RR: 18 (10-30-22 @ 13:00) (18 - 18)  SpO2: 98% (10-30-22 @ 13:00) (91% - 98%) on (O2)    CHEST/LUNG: Diminished breath sounds left lung field, crackles right lung field.  HEART: Regular rate and rhythm  Relevant labs, radiology and Medications reviewed    Plan:  -Case booked for Monday (10/31) with Dr. Tirado pending medical optimization. Primary team aware.  -Daily CXRs  -Must be optimized for OR, will need labs (Coags, CBC, CMP, active T&S, active Covid)  -Remainder of care per primary team.

## 2022-10-30 NOTE — PROGRESS NOTE ADULT - PROBLEM SELECTOR PLAN 1
-2/2 large pleural effusion and pulm vasc congestion  -troponin elevation likely 2/2 CHF exacerbation (stable x 3 sets). card following   -TTE: Severe global left ventricular systolic dysfunction. EF 28%  -tele monitoring  -Lasix 40 mg iv BID, has chronic hudson for strict ins and outs.  -bipap 14/7 w/ fio2 60%  -pulm consulted  - CT surgery eval for thoracentesis (INR 2.5, procedure canceled)  - vit K PO x 1 for elevated INR in anticipation for thoracentesis  - INR now below 2, Cr okay, so start Lovenox 90 mg SQ BID (weight based), holding for procedure.

## 2022-10-30 NOTE — PROGRESS NOTE ADULT - SUBJECTIVE AND OBJECTIVE BOX
CARDIOLOGY FOLLOW UP - Dr. Stephens  Date of Service: 10/30/2022  CC: no events    Review of Systems:  Constitutional: No fever, weight loss, or fatigue  Respiratory: No cough, wheezing, or hemoptysis, no shortness of breath  Cardiovascular: No chest pain, palpitations, passing out, dizziness, or leg swelling  Gastrointestinal: No abd or epigastric pain. No nausea, vomiting, or hematemesis; no diarrhea or consiptaiton, no melena or hematochezia  Vascular: No edema     TELEMETRY:    PHYSICAL EXAM:  T(C): 36.9 (10-30-22 @ 05:01), Max: 36.9 (10-30-22 @ 05:01)  HR: 74 (10-30-22 @ 07:31) (71 - 99)  BP: 124/71 (10-30-22 @ 05:01) (110/71 - 133/66)  RR: 18 (10-30-22 @ 05:01) (18 - 18)  SpO2: 95% (10-30-22 @ 07:31) (91% - 100%)  Wt(kg): --  I&O's Summary    29 Oct 2022 07:01  -  30 Oct 2022 07:00  --------------------------------------------------------  IN: 577 mL / OUT: 1100 mL / NET: -523 mL    30 Oct 2022 07:01  -  30 Oct 2022 09:25  --------------------------------------------------------  IN: 200 mL / OUT: 0 mL / NET: 200 mL        Appearance: Normal	  Cardiovascular: Normal S1 S2,RRR, No JVD, No murmurs  Respiratory: Lungs clear to auscultation	  Gastrointestinal:  Soft, Non-tender, + BS	  Extremities: Normal range of motion, No clubbing, cyanosis or edema  Vascular: Peripheral pulses palpable 2+ bilaterally       Home Medications:  acetaminophen 325 mg oral tablet: 2 tab(s) orally every 6 hours, As needed, Moderate Pain (4 - 6) (23 Sep 2022 16:43)  acetylcysteine 20% inhalation solution: 4 milliliter(s) inhaled 4 times a day as needed (25 Oct 2022 10:37)  acetylcysteine 20% inhalation solution: 4 milliliter(s) inhaled 3 times a day (23 Sep 2022 16:43)  aspirin 81 mg oral delayed release tablet: 1 tab(s) orally once a day (23 Sep 2022 16:43)  Aspirin Enteric Coated 81 mg oral delayed release tablet: 1 tab(s) orally once a day (25 Oct 2022 10:37)  atorvastatin 80 mg oral tablet: 1 tab(s) orally once a day (at bedtime) (23 Sep 2022 16:43)  bacitracin 500 units/g topical ointment: Apply topically to affected area twice to back (25 Oct 2022 10:37)  budesonide 0.5 mg/2 mL inhalation suspension: 0.5 milligram(s) inhaled 2 times a day (14 Sep 2022 19:13)  budesonide 0.5 mg/2 mL inhalation suspension: 2 milliliter(s) inhaled 2 times a day (25 Oct 2022 10:37)  cadexomer iodine 0.9% topical gel: 1 application topically once a day (23 Sep 2022 16:43)  Coumadin 3 mg oral tablet: 1 tab(s) orally once a day (at bedtime) (23 Sep 2022 16:43)  Coumadin 4 mg oral tablet: 1 tab(s) orally once a day (25 Oct 2022 10:37)  finasteride 5 mg oral tablet: 1 tab(s) orally once a day (14 Sep 2022 19:13)  finasteride 5 mg oral tablet: 1 tab(s) orally once a day (25 Oct 2022 10:37)  Flomax 0.4 mg oral capsule: 1 cap(s) orally once a day (25 Oct 2022 10:37)  gabapentin 100 mg oral capsule: 1 tab(s) orally 2 times a day (25 Oct 2022 10:37)  gabapentin 100 mg oral tablet: 1 tab(s) orally 2 times a day (14 Sep 2022 19:13)  guaifenesin-dextromethorphan 100 mg-10 mg/5 mL oral liquid: 10 milliliter(s) orally every 6 hours (23 Sep 2022 16:43)  insulin glargine 100 units/mL subcutaneous solution: 10 unit(s) subcutaneous once a day (at bedtime) (23 Sep 2022 16:43)  insulin lispro 100 units/mL injectable solution: 14 unit(s) injectable once a day before dinner (23 Sep 2022 18:00)  insulin lispro 100 units/mL injectable solution: 14 unit(s) injectable once a day before lunch (23 Sep 2022 18:00)  insulin lispro 100 units/mL injectable solution: 20 unit(s) injectable once a day before Breakfast (23 Sep 2022 18:00)  insulin lispro 100 units/mL subcutaneous solution: 14 unit(s) subcutaneous before lunch and dinner (25 Oct 2022 10:37)  Iodosorb 0.9% topical gel: to heel (25 Oct 2022 10:37)  ipratropium: inhalation as needed (25 Oct 2022 10:37)  ipratropium-albuterol 0.5 mg-2.5 mg/3 mL inhalation solution: 3 milliliter(s) inhaled every 6 hours (23 Sep 2022 16:43)  Lantus 100 units/mL subcutaneous solution: 10 unit(s) subcutaneous once a day (at bedtime) (25 Oct 2022 10:37)  Lasix 20 mg oral tablet: 1 tab(s) orally once a day (25 Oct 2022 10:37)  Lipitor 80 mg oral tablet: 1 tab(s) orally once a day (25 Oct 2022 10:37)  medihoney:  (25 Oct 2022 10:37)  metoprolol tartrate 50 mg oral tablet: 1 tab(s) orally 2 times a day (23 Sep 2022 16:43)  Metoprolol Tartrate 50 mg oral tablet: 1 tab(s) orally 2 times a day (25 Oct 2022 10:37)  montelukast 10 mg oral tablet: 1 tab(s) orally once a day (at bedtime) (23 Sep 2022 16:43)  Multiple Vitamins with Minerals oral tablet: 1 tab(s) orally once a day (23 Sep 2022 16:43)  pantoprazole 40 mg oral delayed release tablet: 1 tab(s) orally once a day (before a meal) (23 Sep 2022 16:43)  polyethylene glycol 3350 oral powder for reconstitution: 17 gram(s) orally once a day (23 Sep 2022 16:43)  predniSONE 50 mg oral tablet: 1 tab(s) orally once a day for one more dose on 9/24/22 then D/C (23 Sep 2022 16:43)  Protonix 40 mg oral delayed release tablet: 1 tab(s) orally once a day (25 Oct 2022 10:37)  senna leaf extract oral tablet: 2 tab(s) orally once a day (at bedtime) (23 Sep 2022 16:43)  Singulair 10 mg oral tablet: 1 tab(s) orally once a day (25 Oct 2022 10:37)  sodium chloride 3% inhalation solution: 4 milliliter(s) inhaled every 12 hours (23 Sep 2022 16:43)  Synthroid 25 mcg (0.025 mg) oral tablet: 1 tab(s) orally once a day (25 Oct 2022 10:37)  Synthroid 25 mcg (0.025 mg) oral tablet: 1 tab(s) orally once a day (14 Sep 2022 19:13)  tamsulosin 0.4 mg oral capsule: 2 cap(s) orally once a day (at bedtime) (14 Sep 2022 19:13)  zinc oxide topical cream: Apply topically to affected area twice to AKA (25 Oct 2022 10:37)        MEDICATIONS  (STANDING):  aspirin enteric coated 81 milliGRAM(s) Oral daily  atorvastatin 80 milliGRAM(s) Oral at bedtime  BACItracin   Ointment 1 Application(s) Topical daily  buDESOnide    Inhalation Suspension 0.5 milliGRAM(s) Inhalation every 12 hours  collagenase Ointment 1 Application(s) Topical daily  dextrose 5%. 1000 milliLiter(s) (50 mL/Hr) IV Continuous <Continuous>  dextrose 5%. 1000 milliLiter(s) (100 mL/Hr) IV Continuous <Continuous>  dextrose 5%. 1000 milliLiter(s) (75 mL/Hr) IV Continuous <Continuous>  dextrose 50% Injectable 25 Gram(s) IV Push once  dextrose 50% Injectable 12.5 Gram(s) IV Push once  dextrose 50% Injectable 25 Gram(s) IV Push once  enoxaparin Injectable 90 milliGRAM(s) SubCutaneous every 12 hours  finasteride 5 milliGRAM(s) Oral daily  glucagon  Injectable 1 milliGRAM(s) IntraMuscular once  insulin glargine Injectable (LANTUS) 7 Unit(s) SubCutaneous at bedtime  insulin lispro (ADMELOG) corrective regimen sliding scale   SubCutaneous three times a day before meals  insulin lispro (ADMELOG) corrective regimen sliding scale   SubCutaneous at bedtime  insulin lispro Injectable (ADMELOG) 6 Unit(s) SubCutaneous three times a day before meals  levothyroxine Injectable 20 MICROGram(s) IV Push at bedtime  metoprolol tartrate 50 milliGRAM(s) Oral two times a day  montelukast 10 milliGRAM(s) Oral daily  pantoprazole  Injectable 40 milliGRAM(s) IV Push every 24 hours  polyethylene glycol 3350 17 Gram(s) Oral every 12 hours  tamsulosin 0.4 milliGRAM(s) Oral at bedtime        EKG:  RADIOLOGY:  DIAGNOSTIC TESTING:  [ ] Echocardiogram:  [ ] Catherterization:  [ ] Stress Test:  OTHER:     LABS:	 	                          9.0    7.44  )-----------( 137      ( 30 Oct 2022 04:55 )             31.4     10-30    146<H>  |  105  |  22  ----------------------------<  150<H>  3.3<L>   |  35<H>  |  0.89    Ca    8.1<L>      30 Oct 2022 04:55  Phos  3.0     10-29  Mg     1.80     10-29        PT/INR - ( 30 Oct 2022 04:55 )   PT: 15.0 sec;   INR: 1.29 ratio         PTT - ( 30 Oct 2022 04:55 )  PTT:35.6 sec    CARDIAC MARKERS:

## 2022-10-30 NOTE — PROGRESS NOTE ADULT - ASSESSMENT
Patient is an 87 year old M hx of severe systolic CHF, b/l pleural effusion s/p thoracentesis in the past, SSS w/ pacemaker w/ f/u w/ EP, CVA w/ residual left sided UE weakness, chronic hudson, BPH, COPD/asthma, restrictive lung dx 2/2 obesity, right AKA, hypothyroid, afib on coumadin who presents from [facility] w/ lethargy and sob.

## 2022-10-30 NOTE — PROGRESS NOTE ADULT - SUBJECTIVE AND OBJECTIVE BOX
SUBJECTIVE/ OVERNIGHT EVENTS:  low sugars  premeal stopped  Pt seen and examined earlier today.   comfortable.   denied pain.   no cp, no sob, no n/v/d.  no HA, no dizziness.       --------------------------------------------------------------------------------------------  LABS:                        9.0    7.44  )-----------( 137      ( 30 Oct 2022 04:55 )             31.4     10-30    146<H>  |  105  |  22  ----------------------------<  150<H>  3.3<L>   |  35<H>  |  0.89    Ca    8.1<L>      30 Oct 2022 04:55  Phos  3.0     10-29  Mg     1.80     10-29      PT/INR - ( 30 Oct 2022 04:55 )   PT: 15.0 sec;   INR: 1.29 ratio         PTT - ( 30 Oct 2022 04:55 )  PTT:35.6 sec  CAPILLARY BLOOD GLUCOSE      POCT Blood Glucose.: 186 mg/dL (30 Oct 2022 20:02)  POCT Blood Glucose.: 59 mg/dL (30 Oct 2022 19:04)  POCT Blood Glucose.: 69 mg/dL (30 Oct 2022 18:04)  POCT Blood Glucose.: 50 mg/dL (30 Oct 2022 18:01)  POCT Blood Glucose.: 160 mg/dL (30 Oct 2022 12:20)  POCT Blood Glucose.: 149 mg/dL (30 Oct 2022 08:45)  POCT Blood Glucose.: 288 mg/dL (29 Oct 2022 22:11)            RADIOLOGY & ADDITIONAL TESTS:    Imaging Personally Reviewed:  [x] YES  [ ] NO    Consultant(s) Notes Reviewed:  [x] YES  [ ] NO    MEDICATIONS  (STANDING):  aspirin enteric coated 81 milliGRAM(s) Oral daily  atorvastatin 80 milliGRAM(s) Oral at bedtime  BACItracin   Ointment 1 Application(s) Topical daily  buDESOnide    Inhalation Suspension 0.5 milliGRAM(s) Inhalation every 12 hours  collagenase Ointment 1 Application(s) Topical daily  dextrose 5%. 1000 milliLiter(s) (50 mL/Hr) IV Continuous <Continuous>  dextrose 5%. 1000 milliLiter(s) (100 mL/Hr) IV Continuous <Continuous>  dextrose 5%. 1000 milliLiter(s) (75 mL/Hr) IV Continuous <Continuous>  dextrose 50% Injectable 25 Gram(s) IV Push once  dextrose 50% Injectable 25 Gram(s) IV Push once  dextrose 50% Injectable 12.5 Gram(s) IV Push once  dextrose 50% Injectable 25 Gram(s) IV Push once  finasteride 5 milliGRAM(s) Oral daily  glucagon  Injectable 1 milliGRAM(s) IntraMuscular once  insulin glargine Injectable (LANTUS) 7 Unit(s) SubCutaneous at bedtime  insulin lispro (ADMELOG) corrective regimen sliding scale   SubCutaneous three times a day before meals  insulin lispro (ADMELOG) corrective regimen sliding scale   SubCutaneous at bedtime  levothyroxine Injectable 20 MICROGram(s) IV Push at bedtime  metoprolol tartrate 50 milliGRAM(s) Oral two times a day  montelukast 10 milliGRAM(s) Oral daily  pantoprazole  Injectable 40 milliGRAM(s) IV Push every 24 hours  polyethylene glycol 3350 17 Gram(s) Oral every 12 hours  tamsulosin 0.4 milliGRAM(s) Oral at bedtime    MEDICATIONS  (PRN):  acetaminophen     Tablet .. 650 milliGRAM(s) Oral every 6 hours PRN Mild Pain (1 - 3), Moderate Pain (4 - 6)  albuterol/ipratropium for Nebulization 3 milliLiter(s) Nebulizer every 6 hours PRN Shortness of Breath and/or Wheezing  dextrose Oral Gel 15 Gram(s) Oral once PRN Blood Glucose LESS THAN 70 milliGRAM(s)/deciliter      Care Discussed with Consultants/Other Providers [x] YES  [ ] NO    Vital Signs Last 24 Hrs  T(C): 36.7 (30 Oct 2022 21:18), Max: 36.9 (30 Oct 2022 05:01)  T(F): 98 (30 Oct 2022 21:18), Max: 98.4 (30 Oct 2022 05:01)  HR: 78 (30 Oct 2022 21:18) (74 - 98)  BP: 142/57 (30 Oct 2022 21:18) (122/64 - 145/65)  BP(mean): --  RR: 18 (30 Oct 2022 21:18) (18 - 18)  SpO2: 94% (30 Oct 2022 21:18) (94% - 98%)    Parameters below as of 30 Oct 2022 21:18  Patient On (Oxygen Delivery Method): nasal cannula      I&O's Summary    29 Oct 2022 07:01  -  30 Oct 2022 07:00  --------------------------------------------------------  IN: 577 mL / OUT: 1100 mL / NET: -523 mL    30 Oct 2022 07:01  -  30 Oct 2022 21:39  --------------------------------------------------------  IN: 200 mL / OUT: 0 mL / NET: 200 mL            PHYSICAL EXAM:  GENERAL: NAD, well-developed, obese man, comfortable on nasal canula  HEAD:  Atraumatic, Normocephalic  EYES: EOMI, PERRLA, conjunctiva and sclera clear  NECK: Supple, No JVD  CHEST/LUNG: mild decrease breath sounds bilaterally; No wheeze   HEART: Irregular rate and rhythm; No murmurs, rubs, or gallops  ABDOMEN: Soft, Nontender, Nondistended; Bowel sounds present  : [place] in place, britney color urine, neg CVAT   Neuro: AAOx3, no focal weakness, mild left UE weakness baseline   EXTREMITIES:  + Peripheral Pulses, No clubbing, cyanosis, + edema, right AKA, left foot dressing d/c/i  SKIN: No rashes or lesions

## 2022-10-30 NOTE — PROGRESS NOTE ADULT - SUBJECTIVE AND OBJECTIVE BOX
[facility] - S [facility] / Ricci S /D Nora/ S Antonia/ SHAHANA Jang/ Jeremi Lee / KENDALL Haynesu/ O Antwon  service -[phone], office [phone]  ---------------------------------------------------------------------------------------------------------------    Patient seen and examined bedside    Subjective and Objective: No overnight events, sob resolved. No complaints today. feeling better    Allergies: No Known Allergies      Hospital Medications:   MEDICATIONS  (STANDING):  aspirin enteric coated 81 milliGRAM(s) Oral daily  atorvastatin 80 milliGRAM(s) Oral at bedtime  BACItracin   Ointment 1 Application(s) Topical daily  buDESOnide    Inhalation Suspension 0.5 milliGRAM(s) Inhalation every 12 hours  collagenase Ointment 1 Application(s) Topical daily  dextrose 5%. 1000 milliLiter(s) (50 mL/Hr) IV Continuous <Continuous>  dextrose 5%. 1000 milliLiter(s) (100 mL/Hr) IV Continuous <Continuous>  dextrose 5%. 1000 milliLiter(s) (75 mL/Hr) IV Continuous <Continuous>  dextrose 50% Injectable 25 Gram(s) IV Push once  dextrose 50% Injectable 12.5 Gram(s) IV Push once  dextrose 50% Injectable 25 Gram(s) IV Push once  finasteride 5 milliGRAM(s) Oral daily  glucagon  Injectable 1 milliGRAM(s) IntraMuscular once  insulin glargine Injectable (LANTUS) 7 Unit(s) SubCutaneous at bedtime  insulin lispro (ADMELOG) corrective regimen sliding scale   SubCutaneous three times a day before meals  insulin lispro (ADMELOG) corrective regimen sliding scale   SubCutaneous at bedtime  insulin lispro Injectable (ADMELOG) 6 Unit(s) SubCutaneous three times a day before meals  levothyroxine Injectable 20 MICROGram(s) IV Push at bedtime  metoprolol tartrate 50 milliGRAM(s) Oral two times a day  montelukast 10 milliGRAM(s) Oral daily  pantoprazole  Injectable 40 milliGRAM(s) IV Push every 24 hours  polyethylene glycol 3350 17 Gram(s) Oral every 12 hours  tamsulosin 0.4 milliGRAM(s) Oral at bedtime      REVIEW OF SYSTEMS:  CONSTITUTIONAL: No weakness, fevers or chills  EYES/ENT: No visual changes;  No vertigo or throat pain   NECK: No pain or stiffness  RESPIRATORY: No cough, wheezing, hemoptysis; No shortness of breath  CARDIOVASCULAR: No chest pain or palpitations.  GASTROINTESTINAL: No abdominal or epigastric pain. No nausea, vomiting, or hematemesis; No diarrhea or constipation. No melena or hematochezia.  GENITOURINARY: No dysuria, frequency, foamy urine, urinary urgency, incontinence or hematuria  NEUROLOGICAL: No numbness or weakness  SKIN: No itching, burning, rashes, or lesions   VASCULAR: No bilateral lower extremity edema.   All other review of systems is negative unless indicated above.    VITALS:  T(F): 97.6 (10-30-22 @ 13:00), Max: 98.4 (10-30-22 @ 05:01)  HR: 80 (10-30-22 @ 15:49)  BP: 145/65 (10-30-22 @ 13:00)  RR: 18 (10-30-22 @ 13:00)  SpO2: 95% (10-30-22 @ 15:49)  Wt(kg): --    10-29 @ :  -  10-30 @ 07:00  --------------------------------------------------------  IN: 577 mL / OUT: 1100 mL / NET: -523 mL    10-30 @ :  -  10-30 @ 18:02  --------------------------------------------------------  IN: 200 mL / OUT: 0 mL / NET: 200 mL          PHYSICAL EXAM:  Constitutional: NAD  HEENT: anicteric sclera, oropharynx clear  Neck: No JVD  Respiratory: CTAB, no wheezes, rales or rhonchi  Cardiovascular: S1, S2, RRR  Gastrointestinal: BS+, soft, NT/ND  Extremities: No cyanosis or clubbing. No peripheral edema  Neurological: A/O x 3, no focal deficits  Psychiatric: Normal mood, normal affect  : No CVA tenderness. No hudson.   Skin: No rashes  Vascular Access:    LABS:  10-30    146<H>  |  105  |  22  ----------------------------<  150<H>  3.3<L>   |  35<H>  |  0.89    Ca    8.1<L>      30 Oct 2022 04:55  Phos  3.0     10-29  Mg     1.80     10-29      Creatinine Trend: 0.89 <--, 0.94 <--, 0.98 <--, 0.92 <--, 0.89 <--, 1.00 <--, 0.97 <--, 1.06 <--, 1.11 <--, 1.04 <--, 0.93 <--, 1.08 <--, 0.96 <--, 0.98 <--                        9.0    7.44  )-----------( 137      ( 30 Oct 2022 04:55 )             31.4     Urine Studies:  Urinalysis Basic - ( 25 Oct 2022 07:30 )    Color: Light Yellow / Appearance: Slightly Turbid / S.012 / pH:   Gluc:  / Ketone: Trace  / Bili: Negative / Urobili: <2 mg/dL   Blood:  / Protein: Trace / Nitrite: Negative   Leuk Esterase: Large / RBC: 2 /HPF / WBC 99 /HPF   Sq Epi:  / Non Sq Epi: 1 /HPF / Bacteria: Many          RADIOLOGY & ADDITIONAL STUDIES:   [facility] - [facility] / Ricci S /D Nora/ S Antonia/ S Suhail/ Jeremi Lee / KENDALL Haynesu/ O Antwon  service -[phone], office [phone]  ---------------------------------------------------------------------------------------------------------------    Patient seen and examined bedside    Subjective and Objective: No overnight events, denied sob. No complaints today.    Allergies: No Known Allergies      Hospital Medications:   MEDICATIONS  (STANDING):  aspirin enteric coated 81 milliGRAM(s) Oral daily  atorvastatin 80 milliGRAM(s) Oral at bedtime  BACItracin   Ointment 1 Application(s) Topical daily  buDESOnide    Inhalation Suspension 0.5 milliGRAM(s) Inhalation every 12 hours  collagenase Ointment 1 Application(s) Topical daily  dextrose 5%. 1000 milliLiter(s) (50 mL/Hr) IV Continuous <Continuous>  dextrose 5%. 1000 milliLiter(s) (100 mL/Hr) IV Continuous <Continuous>  dextrose 5%. 1000 milliLiter(s) (75 mL/Hr) IV Continuous <Continuous>  dextrose 50% Injectable 25 Gram(s) IV Push once  dextrose 50% Injectable 12.5 Gram(s) IV Push once  dextrose 50% Injectable 25 Gram(s) IV Push once  finasteride 5 milliGRAM(s) Oral daily  glucagon  Injectable 1 milliGRAM(s) IntraMuscular once  insulin glargine Injectable (LANTUS) 7 Unit(s) SubCutaneous at bedtime  insulin lispro (ADMELOG) corrective regimen sliding scale   SubCutaneous three times a day before meals  insulin lispro (ADMELOG) corrective regimen sliding scale   SubCutaneous at bedtime  insulin lispro Injectable (ADMELOG) 6 Unit(s) SubCutaneous three times a day before meals  levothyroxine Injectable 20 MICROGram(s) IV Push at bedtime  metoprolol tartrate 50 milliGRAM(s) Oral two times a day  montelukast 10 milliGRAM(s) Oral daily  pantoprazole  Injectable 40 milliGRAM(s) IV Push every 24 hours  polyethylene glycol 3350 17 Gram(s) Oral every 12 hours  tamsulosin 0.4 milliGRAM(s) Oral at bedtime    VITALS:  T(F): 97.6 (10-30-22 @ 13:00), Max: 98.4 (10-30-22 @ 05:01)  HR: 80 (10-30-22 @ 15:49)  BP: 145/65 (10-30-22 @ 13:00)  RR: 18 (10-30-22 @ 13:00)  SpO2: 95% (10-30-22 @ 15:49)  Wt(kg): --    10-29 @ 07:01  -  10-30 @ 07:00  --------------------------------------------------------  IN: 577 mL / OUT: 1100 mL / NET: -523 mL    10-30 @ 07:01  -  10-30 @ 18:02  --------------------------------------------------------  IN: 200 mL / OUT: 0 mL / NET: 200 mL      PHYSICAL EXAM:  Constitutional: NAD  HEENT: anicteric sclera  Neck: No JVD  Respiratory: bibasila rcrepts, no wheezes  Cardiovascular: S1, S2, RRR  Gastrointestinal: BS+, soft, NT/ND  Extremities: +lle pedal edema. rt AKA+   Neurological: awake  Psychiatric: Normal mood, normal affect  : + hudson.     LABS:  10-30    146<H>  |  105  |  22  ----------------------------<  150<H>  3.3<L>   |  35<H>  |  0.89    Ca    8.1<L>      30 Oct 2022 04:55  Phos  3.0     10-29  Mg     1.80     10-      Creatinine Trend: 0.89 <--, 0.94 <--, 0.98 <--, 0.92 <--, 0.89 <--, 1.00 <--, 0.97 <--, 1.06 <--, 1.11 <--, 1.04 <--, 0.93 <--, 1.08 <--, 0.96 <--, 0.98 <--                        9.0    7.44  )-----------( 137      ( 30 Oct 2022 04:55 )             31.4     Urine Studies:  Urinalysis Basic - ( 25 Oct 2022 07:30 )    Color: Light Yellow / Appearance: Slightly Turbid / S.012 / pH:   Gluc:  / Ketone: Trace  / Bili: Negative / Urobili: <2 mg/dL   Blood:  / Protein: Trace / Nitrite: Negative   Leuk Esterase: Large / RBC: 2 /HPF / WBC 99 /HPF   Sq Epi:  / Non Sq Epi: 1 /HPF / Bacteria: Many          RADIOLOGY & ADDITIONAL STUDIES:

## 2022-10-30 NOTE — PROGRESS NOTE ADULT - PROBLEM SELECTOR PLAN 1
events noted: seen by thoracic surgery  : was supposed to get pleurl effusion drained today  : but labs needed to be optimized:  now due for tomorrow: he recently had thoracenteses  done on both sides at [facility]:  has poor LV function:  currently on diuresis    10/27: diuresis being done : no chest tube done as he is not optimized for the OR yet:  appreciate thoracic consult:  cont diuresis for now:  chest xray today seems  with better oxygenation    10/28: he seems to doing  ok ; no sob:  has large effusion on the  left side and he was recently tapped at [facility]:  has severe LV dysfunction:: pl for drainage on Monday : haley acp thoracic    10/29: still has large effusion on left side:  awaiting procedure on left side    10/30: seems OK : stephen any sob:  but he looks sob to me:  awaiting pl fluid drainage in am

## 2022-10-30 NOTE — PROGRESS NOTE ADULT - NSPROGADDITIONALINFOA_GEN_ALL_CORE
d/w pt and NP.  d/w card and renal. d/w CT surgery.     # Hypernatremia  Na improving, s/p D5W  repeat BMP this evening.  diuretic also on hold  depending on the Na, may need gentle D5W.     holding Lovenox SQ for procedure tomorrow    - Dr. SADIE Heller ([facility])  - [phone]

## 2022-10-30 NOTE — PROGRESS NOTE ADULT - ASSESSMENT
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

## 2022-10-30 NOTE — PROGRESS NOTE ADULT - ASSESSMENT
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 Renal following for Hypernatremia Mx    Hypernatremia 2/2 free water deficit -  Na 143>147 >154 >146 today -improving    s/p Lasix 40mg IV BID-held 10/29 2/2 worsening Na  s/p IVF D5W @75ml/hr x12hrs, Na level betetr. watch off ivf. high risk for chf   on pureed diet   BMP qdaily    Acute respiratory failure with hypoxia 2/2 large pleural effusion and pulm vasc congestion  -CMP - TTE: Severe global left ventricular systolic dysfunction. EF 28%  -tele monitoring  - s/p Lasix 40 mg iv BID, now on hold  -bipap 14/7 w/ fio2 60%  - f/u pulm   - f/u CT surgery eval for thoracentesis  optimized renal stand point for procedure/OR    will closely follow up.   labs, rad, chart reviewed  For any question, pl call:  Nephrology  Cell -[phone]  Office [phone]  Ans Serv [phone]

## 2022-10-31 NOTE — PROGRESS NOTE ADULT - PROBLEM SELECTOR PLAN 1
astonm for patient to call us back to schedule a 4 month f/u appointment with Dr Cadet   events noted: seen by thoracic surgery  : was supposed to get pleurl effusion drained today  : but labs needed to be optimized:  now due for tomorrow: he recently had thoracenteses  done on both sides at [facility]:  has poor LV function:  currently on diuresis    10/27: diuresis being done : no chest tube done as he is not optimized for the OR yet:  appreciate thoracic consult:  cont diuresis for now:  chest xray today seems  with better oxygenation    10/28: he seems to doing  ok ; no sob:  has large effusion on the  left side and he was recently tapped at [facility]:  has severe LV dysfunction:: pl for drainage on Monday : haley acp thoracic    10/29: still has large effusion on left side:  awaiting procedure on left side    10/30: seems OK : stephen any sob:  but he looks sob to me:  awaiting pl fluid drainage in am    10/31: drained about 1500 cc of fluid:

## 2022-10-31 NOTE — PROGRESS NOTE ADULT - ASSESSMENT
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

## 2022-10-31 NOTE — PROCEDURE NOTE - NSPROCDETAILS_GEN_ALL_CORE
location identified, draped/prepped, sterile technique used, needle inserted/introduced/catheter inserted over needle/connection to syringe

## 2022-10-31 NOTE — PROGRESS NOTE ADULT - ASSESSMENT
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

## 2022-10-31 NOTE — PROGRESS NOTE ADULT - SUBJECTIVE AND OBJECTIVE BOX
CARDIOLOGY FOLLOW UP NOTE - DR. MARI    Patient Name: LIBRA PEÑA  Date of Service: 10-31-22 @ 12:53    Patient seen and examined  s/p thoracentesis   less dyspnea      Subjective:    cv: denies chest pain, + dyspnea, palpitations, dizziness  pulmonary: + cough  GI: denies abdominal pain, nausea, vomiting  vascular/legs: no edema   skin: no rash  ROS: otherwise negative   overnight events:      PHYSICAL EXAM:  T(C): 36.6 (10-31-22 @ 12:34), Max: 37 (10-31-22 @ 04:34)  HR: 72 (10-31-22 @ 12:34) (60 - 82)  BP: 128/56 (10-31-22 @ 12:34) (128/56 - 145/65)  RR: 18 (10-31-22 @ 08:34) (18 - 18)  SpO2: 100% (10-31-22 @ 12:34) (94% - 100%)  Wt(kg): --  I&O's Summary    30 Oct 2022 07:01  -  31 Oct 2022 07:00  --------------------------------------------------------  IN: 200 mL / OUT: 1300 mL / NET: -1100 mL      Daily     Daily     Appearance: Normal	  Cardiovascular: Normal S1 S2,RRR, No JVD, No murmurs  Respiratory: Lungs clear to auscultation dec bs bases	  Gastrointestinal:  Soft, Non-tender, + BS	  Extremities: Normal range of motion, + edema       Home Medications:  acetaminophen 325 mg oral tablet: 2 tab(s) orally every 6 hours, As needed, Moderate Pain (4 - 6) (23 Sep 2022 16:43)  acetylcysteine 20% inhalation solution: 4 milliliter(s) inhaled 4 times a day as needed (25 Oct 2022 10:37)  acetylcysteine 20% inhalation solution: 4 milliliter(s) inhaled 3 times a day (23 Sep 2022 16:43)  aspirin 81 mg oral delayed release tablet: 1 tab(s) orally once a day (23 Sep 2022 16:43)  Aspirin Enteric Coated 81 mg oral delayed release tablet: 1 tab(s) orally once a day (25 Oct 2022 10:37)  atorvastatin 80 mg oral tablet: 1 tab(s) orally once a day (at bedtime) (23 Sep 2022 16:43)  bacitracin 500 units/g topical ointment: Apply topically to affected area twice to back (25 Oct 2022 10:37)  budesonide 0.5 mg/2 mL inhalation suspension: 0.5 milligram(s) inhaled 2 times a day (14 Sep 2022 19:13)  budesonide 0.5 mg/2 mL inhalation suspension: 2 milliliter(s) inhaled 2 times a day (25 Oct 2022 10:37)  cadexomer iodine 0.9% topical gel: 1 application topically once a day (23 Sep 2022 16:43)  Coumadin 3 mg oral tablet: 1 tab(s) orally once a day (at bedtime) (23 Sep 2022 16:43)  Coumadin 4 mg oral tablet: 1 tab(s) orally once a day (25 Oct 2022 10:37)  finasteride 5 mg oral tablet: 1 tab(s) orally once a day (14 Sep 2022 19:13)  finasteride 5 mg oral tablet: 1 tab(s) orally once a day (25 Oct 2022 10:37)  Flomax 0.4 mg oral capsule: 1 cap(s) orally once a day (25 Oct 2022 10:37)  gabapentin 100 mg oral capsule: 1 tab(s) orally 2 times a day (25 Oct 2022 10:37)  gabapentin 100 mg oral tablet: 1 tab(s) orally 2 times a day (14 Sep 2022 19:13)  guaifenesin-dextromethorphan 100 mg-10 mg/5 mL oral liquid: 10 milliliter(s) orally every 6 hours (23 Sep 2022 16:43)  insulin glargine 100 units/mL subcutaneous solution: 10 unit(s) subcutaneous once a day (at bedtime) (23 Sep 2022 16:43)  insulin lispro 100 units/mL injectable solution: 14 unit(s) injectable once a day before dinner (23 Sep 2022 18:00)  insulin lispro 100 units/mL injectable solution: 14 unit(s) injectable once a day before lunch (23 Sep 2022 18:00)  insulin lispro 100 units/mL injectable solution: 20 unit(s) injectable once a day before Breakfast (23 Sep 2022 18:00)  insulin lispro 100 units/mL subcutaneous solution: 14 unit(s) subcutaneous before lunch and dinner (25 Oct 2022 10:37)  Iodosorb 0.9% topical gel: to heel (25 Oct 2022 10:37)  ipratropium: inhalation as needed (25 Oct 2022 10:37)  ipratropium-albuterol 0.5 mg-2.5 mg/3 mL inhalation solution: 3 milliliter(s) inhaled every 6 hours (23 Sep 2022 16:43)  Lantus 100 units/mL subcutaneous solution: 10 unit(s) subcutaneous once a day (at bedtime) (25 Oct 2022 10:37)  Lasix 20 mg oral tablet: 1 tab(s) orally once a day (25 Oct 2022 10:37)  Lipitor 80 mg oral tablet: 1 tab(s) orally once a day (25 Oct 2022 10:37)  medihoney:  (25 Oct 2022 10:37)  metoprolol tartrate 50 mg oral tablet: 1 tab(s) orally 2 times a day (23 Sep 2022 16:43)  Metoprolol Tartrate 50 mg oral tablet: 1 tab(s) orally 2 times a day (25 Oct 2022 10:37)  montelukast 10 mg oral tablet: 1 tab(s) orally once a day (at bedtime) (23 Sep 2022 16:43)  Multiple Vitamins with Minerals oral tablet: 1 tab(s) orally once a day (23 Sep 2022 16:43)  pantoprazole 40 mg oral delayed release tablet: 1 tab(s) orally once a day (before a meal) (23 Sep 2022 16:43)  polyethylene glycol 3350 oral powder for reconstitution: 17 gram(s) orally once a day (23 Sep 2022 16:43)  predniSONE 50 mg oral tablet: 1 tab(s) orally once a day for one more dose on 9/24/22 then D/C (23 Sep 2022 16:43)  Protonix 40 mg oral delayed release tablet: 1 tab(s) orally once a day (25 Oct 2022 10:37)  senna leaf extract oral tablet: 2 tab(s) orally once a day (at bedtime) (23 Sep 2022 16:43)  Singulair 10 mg oral tablet: 1 tab(s) orally once a day (25 Oct 2022 10:37)  sodium chloride 3% inhalation solution: 4 milliliter(s) inhaled every 12 hours (23 Sep 2022 16:43)  Synthroid 25 mcg (0.025 mg) oral tablet: 1 tab(s) orally once a day (25 Oct 2022 10:37)  Synthroid 25 mcg (0.025 mg) oral tablet: 1 tab(s) orally once a day (14 Sep 2022 19:13)  tamsulosin 0.4 mg oral capsule: 2 cap(s) orally once a day (at bedtime) (14 Sep 2022 19:13)  zinc oxide topical cream: Apply topically to affected area twice to AKA (25 Oct 2022 10:37)      MEDICATIONS  (STANDING):  aspirin enteric coated 81 milliGRAM(s) Oral daily  atorvastatin 80 milliGRAM(s) Oral at bedtime  BACItracin   Ointment 1 Application(s) Topical daily  buDESOnide    Inhalation Suspension 0.5 milliGRAM(s) Inhalation every 12 hours  collagenase Ointment 1 Application(s) Topical daily  dextrose 5%. 1000 milliLiter(s) (50 mL/Hr) IV Continuous <Continuous>  dextrose 5%. 1000 milliLiter(s) (100 mL/Hr) IV Continuous <Continuous>  dextrose 5%. 1000 milliLiter(s) (75 mL/Hr) IV Continuous <Continuous>  dextrose 5%. 1000 milliLiter(s) (75 mL/Hr) IV Continuous <Continuous>  dextrose 5%. 1000 milliLiter(s) (75 mL/Hr) IV Continuous <Continuous>  dextrose 50% Injectable 25 Gram(s) IV Push once  dextrose 50% Injectable 25 Gram(s) IV Push once  dextrose 50% Injectable 12.5 Gram(s) IV Push once  dextrose 50% Injectable 25 Gram(s) IV Push once  finasteride 5 milliGRAM(s) Oral daily  glucagon  Injectable 1 milliGRAM(s) IntraMuscular once  insulin glargine Injectable (LANTUS) 7 Unit(s) SubCutaneous at bedtime  insulin lispro (ADMELOG) corrective regimen sliding scale   SubCutaneous three times a day before meals  insulin lispro (ADMELOG) corrective regimen sliding scale   SubCutaneous at bedtime  levothyroxine Injectable 20 MICROGram(s) IV Push at bedtime  metoprolol tartrate 50 milliGRAM(s) Oral two times a day  montelukast 10 milliGRAM(s) Oral daily  pantoprazole  Injectable 40 milliGRAM(s) IV Push every 24 hours  polyethylene glycol 3350 17 Gram(s) Oral every 12 hours  tamsulosin 0.4 milliGRAM(s) Oral at bedtime      TELEMETRY: 	    ECG:  	  RADIOLOGY:   DIAGNOSTIC TESTING:  [ ] Echocardiogram:  [ ] Catheterization:  [ ] Stress Test:    OTHER: 	    LABS:	 	    CARDIAC MARKERS:                                      8.5    7.70  )-----------( 115      ( 31 Oct 2022 07:12 )             31.7     10-31    147<H>  |  102  |  23  ----------------------------<  75  4.6   |  27  |  0.91    Ca    8.2<L>      31 Oct 2022 07:12  Phos  2.2     10-31  Mg     1.70     10-31      proBNP:   PT/INR - ( 31 Oct 2022 05:40 )   PT: 14.5 sec;   INR: 1.25 ratio         PTT - ( 31 Oct 2022 07:12 )  PTT:36.5 sec  Lipid Profile:   HgA1c:     Creatinine, Serum: 0.91 mg/dL (10-31-22 @ 07:12)  Creatinine, Serum: 0.88 mg/dL (10-31-22 @ 05:40)  Creatinine, Serum: 0.90 mg/dL (10-30-22 @ 20:22)  Creatinine, Serum: 0.89 mg/dL (10-30-22 @ 04:55)  Creatinine, Serum: 0.94 mg/dL (10-29-22 @ 09:27)  Creatinine, Serum: 0.98 mg/dL (10-28-22 @ 23:09)

## 2022-10-31 NOTE — PROGRESS NOTE ADULT - NSPROGADDITIONALINFOA_GEN_ALL_CORE
paged acp : awaiting response:    10/28: dw acp and thoracic NP    10/29: paged acp    10/30: dw acp    10/31: dw acp

## 2022-10-31 NOTE — PROGRESS NOTE ADULT - ASSESSMENT
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 Renal following for Hypernatremia Mx    Hypernatremia 2/2 free water deficit -  Na 143>147 >154 >146>147    can provide IVF over 12hrs again today, then observe off fluids and diuretics  on pureed diet   BMP qdaily    Acute respiratory failure with hypoxia 2/2 large pleural effusion and pulm vasc congestion  -CMP - TTE: Severe global left ventricular systolic dysfunction. EF 28%  -tele monitoring  - s/p Lasix 40 mg iv BID, now on hold  -bipap 14/7 w/ fio2 60%  - f/u pulm   - f/u CT surgery eval for thoracentesis  optimized renal stand point for procedure/OR      For any question, call:  Cell # [phone]  Pager # [phone]  Callback # [phone]

## 2022-10-31 NOTE — PROGRESS NOTE ADULT - SUBJECTIVE AND OBJECTIVE BOX
[facility] - ORESTES Wall / ORESTES Wynne / KARIE Melendez/ ORESTES Knight/ ORESTES Jang/ ADRIA Lee / TOÑITO Mora / ROBB Kapoor  ---------------------------------------------------------------------------------------------------------------  seen and examined today for hypernatremia  Interval : NAD  VITALS:  T(F): 97.4 (10-31-22 @ 08:34), Max: 98.6 (10-31-22 @ 04:34)  HR: 62 (10-31-22 @ 09:17)  BP: 129/52 (10-31-22 @ 08:34)  RR: 18 (10-31-22 @ 08:34)  SpO2: 98% (10-31-22 @ 09:17)  Wt(kg): --    10-30 @ 07:01  -  10-31 @ 07:00  --------------------------------------------------------  IN: 200 mL / OUT: 1300 mL / NET: -1100 mL      Physical Exam :-  Constitutional: NAD  Neck: Supple.  Respiratory: Bilateral equal breath sounds,  Cardiovascular: S1, S2 normal,  Gastrointestinal: Bowel Sounds present, soft, non tender.  Extremities: Right BKA, upper extremity edema +  Neurological: Alert and Oriented   Psychiatric: Normal mood, normal affect  Data:-  Allergies :   No Known Allergies    Hospital Medications:   MEDICATIONS  (STANDING):  aspirin enteric coated 81 milliGRAM(s) Oral daily  atorvastatin 80 milliGRAM(s) Oral at bedtime  BACItracin   Ointment 1 Application(s) Topical daily  buDESOnide    Inhalation Suspension 0.5 milliGRAM(s) Inhalation every 12 hours  collagenase Ointment 1 Application(s) Topical daily  dextrose 5%. 1000 milliLiter(s) (50 mL/Hr) IV Continuous <Continuous>  dextrose 5%. 1000 milliLiter(s) (100 mL/Hr) IV Continuous <Continuous>  dextrose 5%. 1000 milliLiter(s) (75 mL/Hr) IV Continuous <Continuous>  dextrose 5%. 1000 milliLiter(s) (75 mL/Hr) IV Continuous <Continuous>  dextrose 5%. 1000 milliLiter(s) (75 mL/Hr) IV Continuous <Continuous>  dextrose 50% Injectable 25 Gram(s) IV Push once  dextrose 50% Injectable 25 Gram(s) IV Push once  dextrose 50% Injectable 12.5 Gram(s) IV Push once  dextrose 50% Injectable 25 Gram(s) IV Push once  finasteride 5 milliGRAM(s) Oral daily  glucagon  Injectable 1 milliGRAM(s) IntraMuscular once  insulin glargine Injectable (LANTUS) 7 Unit(s) SubCutaneous at bedtime  insulin lispro (ADMELOG) corrective regimen sliding scale   SubCutaneous three times a day before meals  insulin lispro (ADMELOG) corrective regimen sliding scale   SubCutaneous at bedtime  levothyroxine Injectable 20 MICROGram(s) IV Push at bedtime  lidocaine 1% Injectable 10 milliLiter(s) Local Injection once  metoprolol tartrate 50 milliGRAM(s) Oral two times a day  montelukast 10 milliGRAM(s) Oral daily  pantoprazole  Injectable 40 milliGRAM(s) IV Push every 24 hours  polyethylene glycol 3350 17 Gram(s) Oral every 12 hours  potassium chloride  10 mEq/100 mL IVPB 10 milliEquivalent(s) IV Intermittent once  tamsulosin 0.4 milliGRAM(s) Oral at bedtime    10-31    147<H>  |  102  |  23  ----------------------------<  75  4.6   |  27  |  0.91    Ca    8.2<L>      31 Oct 2022 07:12  Phos  2.2     10-31  Mg     1.70     10-31      Creatinine Trend: 0.91 <--, 0.88 <--, 0.90 <--, 0.89 <--, 0.94 <--, 0.98 <--, 0.92 <--, 0.89 <--, 1.00 <--, 0.97 <--, 1.06 <--, 1.11 <--, 1.04 <--, 0.93 <--, 1.08 <--, 0.96 <--, 0.98 <--                        8.5    7.70  )-----------( 115      ( 31 Oct 2022 07:12 )             31.7

## 2022-10-31 NOTE — PROGRESS NOTE ADULT - SUBJECTIVE AND OBJECTIVE BOX
Patient is a 87y old  Male who presents with a chief complaint of Acute hypoxic respiratory failure (31 Oct 2022 10:34)       INTERVAL HPI/OVERNIGHT EVENTS:  Patient seen and evaluated at bedside.  Pt is resting comfortable in NAD. Denies N/V/F/C.  Pain rated at X/10    Allergies    No Known Allergies    Intolerances        Vital Signs Last 24 Hrs  T(C): 36.3 (31 Oct 2022 08:34), Max: 37 (31 Oct 2022 04:34)  T(F): 97.4 (31 Oct 2022 08:34), Max: 98.6 (31 Oct 2022 04:34)  HR: 62 (31 Oct 2022 09:17) (60 - 82)  BP: 129/52 (31 Oct 2022 08:34) (129/52 - 145/65)  BP(mean): --  RR: 18 (31 Oct 2022 08:34) (18 - 18)  SpO2: 98% (31 Oct 2022 09:17) (94% - 99%)    Parameters below as of 31 Oct 2022 09:17  Patient On (Oxygen Delivery Method): nasal cannula        LABS:                        8.5    7.70  )-----------( 115      ( 31 Oct 2022 07:12 )             31.7     10-31    147<H>  |  102  |  23  ----------------------------<  75  4.6   |  27  |  0.91    Ca    8.2<L>      31 Oct 2022 07:12  Phos  2.2     10-31  Mg     1.70     10-31      PT/INR - ( 31 Oct 2022 05:40 )   PT: 14.5 sec;   INR: 1.25 ratio         PTT - ( 31 Oct 2022 07:12 )  PTT:36.5 sec    CAPILLARY BLOOD GLUCOSE      POCT Blood Glucose.: 178 mg/dL (31 Oct 2022 08:53)  POCT Blood Glucose.: 180 mg/dL (31 Oct 2022 08:03)  POCT Blood Glucose.: 238 mg/dL (30 Oct 2022 22:16)  POCT Blood Glucose.: 186 mg/dL (30 Oct 2022 20:02)  POCT Blood Glucose.: 59 mg/dL (30 Oct 2022 19:04)  POCT Blood Glucose.: 69 mg/dL (30 Oct 2022 18:04)  POCT Blood Glucose.: 50 mg/dL (30 Oct 2022 18:01)  POCT Blood Glucose.: 160 mg/dL (30 Oct 2022 12:20)      Lower Extremity Physical Exam:  s/p RLE BKA amputation  Vasular: DP/PT 0/4, L, CFT < 3 seconds L, Temperature gradient warm to cool, L.   Neuro: Epicritic sensation diminished to the level of the toes, L.  Musculoskeletal/Ortho: R BKA  Skin: punctate posterior heel wound to level of subq with fibrotic base, no undermining or tracking, no purulence or malodor, lateral 5th MPJ punctate wound to level of capsule with fibrotic base, mild periwound erythema, no drainage or clinical signs of infection. Right BKA.    RADIOLOGY & ADDITIONAL TESTS:

## 2022-10-31 NOTE — CHART NOTE - NSCHARTNOTEFT_GEN_A_CORE
Pt seen and examined by thoracic this AM.  s/p L thoracentesis with 1500 ml removed this AM  pt states shortness of breath has improved and is more comfortable   Pt denies chest pain, abdominal pain, headache, N/V.    Vital Signs Last 24 Hrs  T(C): 36.6 (31 Oct 2022 12:34), Max: 37 (31 Oct 2022 04:34)  T(F): 97.9 (31 Oct 2022 12:34), Max: 98.6 (31 Oct 2022 04:34)  HR: 72 (31 Oct 2022 12:34) (60 - 80)  BP: 128/56 (31 Oct 2022 12:34) (128/56 - 142/57)  BP(mean): --  ABP: --  ABP(mean): --  RR: 18 (31 Oct 2022 08:34) (18 - 18)  SpO2: 100% (31 Oct 2022 12:34) (94% - 100%)    O2 Parameters below as of 31 Oct 2022 12:34  Patient On (Oxygen Delivery Method): nasal cannula  O2 Flow (L/min): 2    General: resting comfortably, NAD  CHEST/LUNG: crackles right lung field  HEART: Regular rate and rhythm  Relevant labs, radiology and Medications reviewed  CXR reviewed    Plan:  - care per primary team  - Recommend AM CXR  -call thoracic with any questions at [phone]

## 2022-10-31 NOTE — PROGRESS NOTE ADULT - PROBLEM SELECTOR PLAN 1
- 2/2 large pleural effusion and pulm vasc congestion  - troponin elevation likely 2/2 CHF exacerbation (stable x 3 sets). card following   - TTE: Severe global left ventricular systolic dysfunction. EF 28%  - tele monitoring: no events. paced rhythm.   - Lasix 40 mg iv BID, has chronic [place] for strict ins and outs.  - pulm consulted  - CT surgery eval for thoracentesis (INR 2.5, procedure canceled)  - vit K PO x 1 for elevated INR in anticipation for thoracentesis, done 10/31/22: s/p 1500 cc fluid removed.   - Lovenox 90 mg SQ BID (weight based) bridge, restarted procedure.   - will not bridge coumadin yet today (may need Pleurx vs. repeat tap if reaccumulate)

## 2022-10-31 NOTE — PROGRESS NOTE ADULT - NSPROGADDITIONALINFOA_GEN_ALL_CORE
d/w pt and NP.  d/w card and renal. d/w CT surgery.     # Hypernatremia  Na improving, s/p D5W  diuretic also on hold  depending on the Na, may need gentle D5W vs. restarting diuretics.  renal f/u.     - Dr. NOEL Htet ([facility])  - [phone]

## 2022-10-31 NOTE — PROGRESS NOTE ADULT - ASSESSMENT
TTE with Doppler (w/Cont) (04.03.22 @ 15:07) >  mild aortic stenosis. . Mild left ventricular systolic dysfunction. The inferior wall, and the inferoseptum are hypokinetic.  Echo 9/15/22: .EF 35-40% Severe segmental left ventricular systolic dysfunction. The mid to distal anteroseptum and severely hypokinetic. The mid to basal inferolateral wall is hypokinetic.      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 The mid to distal anteroseptum and severely hypokinetic. The mid to basal inferolateral wall is hypokinetic.  -thoracic surgery f/u    # acute on chronic systolic CHF   -stable vol status   -hold diuretics for now   -eventual resume daily lasix   -echo with moderate severe lv dysfxn, unchanged from echo  4/2022  -continue medical management of CMP/HF  -ecg, no ischemic changes, HS trop elevated secondary to hypoxia/ chf/ resp infection   -c/w bb     #Pafib, sp PPM   -c/w BB  -off oral ac  -NEED TO START IV HEP FOR A/C, HIGH CHADS SCORE,oral a/c on hold    #mild as   -echo stable     #Abnl UA  -abx per med      #hypernatremia   -renal f/u    35 minutes spent on total encounter; more than 50% of the visit was spent counseling and/or coordinating care by the attending physician.

## 2022-11-01 NOTE — PROGRESS NOTE ADULT - ASSESSMENT
TTE with Doppler (w/Cont) (04.03.22 @ 15:07) >  mild aortic stenosis. . Mild left ventricular systolic dysfunction. The inferior wall, and the inferoseptum are hypokinetic.  Echo 9/15/22: .EF 35-40% Severe segmental left ventricular systolic dysfunction. The mid to distal anteroseptum and severely hypokinetic. The mid to basal inferolateral wall is hypokinetic.      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 The mid to distal anteroseptum and severely hypokinetic. The mid to basal inferolateral wall is hypokinetic.  -thoracic surgery f/u    # acute on chronic systolic CHF   -stable vol status   -hold diuretics for now   -eventual resume daily lasix   -echo with moderate severe lv dysfxn, unchanged from echo  4/2022  -continue medical management of CMP/HF  -ecg, no ischemic changes, HS trop elevated secondary to hypoxia/ chf/ resp infection   -c/w bb     #Pafib, sp PPM   -c/w BB  -off oral ac  -cont AC    #mild as   -echo stable     #Abnl UA  -abx per med      #hypernatremia   -renal f/u    35 minutes spent on total encounter; more than 50% of the visit was spent counseling and/or coordinating care by the attending physician.

## 2022-11-01 NOTE — PROGRESS NOTE ADULT - NSPROGADDITIONALINFOA_GEN_ALL_CORE
d/w pt and NP.  d/w card and renal.   d/w the daughter Willow ALONSO. (the other daughter Nola lives in [place]).   Advance directives discussed.  Introduced the philosophy of palliative care: quality vs. quantity of life.   Goals of care discussed. All questions answered. The daughter wishes conservative management given irreversible condition associated with the underlying disease process and numerous comobidities and recurrent hospitalizations.   Pt requests DNR/DNI. No feeding tube.    Advance care planning time: approximately 30 mins spent discussing goals of care, prognosis, diagnosis and treatment plan.     - Dr. SADIE Heller ([facility])  - [phone] d/w pt and NP.  d/w card and renal.   d/w the daughter Willow ALONSO. (the other daughter Nola lives in [place]).   Advance directives discussed.  Introduced the philosophy of palliative care: quality vs. quantity of life.   Goals of care discussed. All questions answered. The daughter wishes conservative management given irreversible condition associated with the underlying disease process and numerous comobidities and recurrent hospitalizations.   Pt requests DNR/DNI. No feeding tube.    The daughter wishes that he will be going home with home hospice once he is a little better optimized.   Advance care planning time: approximately 37 mins spent discussing goals of care, prognosis, diagnosis and treatment plan.     - Dr. SADIE Heller ([employer])  - [phone]

## 2022-11-01 NOTE — CONSULT NOTE ADULT - ASSESSMENT
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 Palliative care called for goals of care and advance care planning

## 2022-11-01 NOTE — CONSULT NOTE ADULT - PROBLEM SELECTOR RECOMMENDATION 2
no wheezing : start BD and Duoneb : ct scan chest reveals emphysema : has chr retnetionofpco2: was on bipap earlier : now none:
Due to large pleural effusion and pulmonary vascular  congestion  Management per primary team   Lasix ongoing   Chronic hudson  strick I&O

## 2022-11-01 NOTE — RAPID RESPONSE TEAM SUMMARY - NSSITUATIONBACKGROUNDRRT_GEN_ALL_CORE
87 year old M hx of severe systolic CHF, b/l pleural effusion s/p thoracentesis in the past, SSS w/ pacemaker w/ f/u w/ EP, CVA w/ residual left sided UE weakness, chronic hudson, BPH, COPD/asthma, restrictive lung dx 2/2 obesity, right AKA, hypothyroid, afib on coumadin who presents from [facility] w/ lethargy and sob. Per primary team/RN at bedside pt has been stable throughout the day on HFNC. RRT called for acute hypoxia (O2 sat in 40s) and unresponsive episode, reportedly in setting of removing HFNC. On arrival to RRT, patient with O2 sat in low 90s, responsive to commands, continued to improve throughout RRT with replacement of HFNC. Patient on 40 mg Lasix IV BID, last given this morning. Administered next dose early during RRT. Patient currently full code with ongoing discussion with family/palliative re: code status. Primary team attempted to reach out to family during RRT but was unsuccessful. After discussion with primary team/RN/RRT team, RRT ended with primary team to follow up on urine output, work of breathing, oxygenation, with ongoing discussion with patient/family re: GOC. Patient remained on HFNC with continued close monitoring.

## 2022-11-01 NOTE — CONSULT NOTE ADULT - CONVERSATION DETAILS
Met with patient at bedside , alert / oriented x 3 but unable to participate in conversation due to persistent shortness of breath and anxiety.   Spoke with daughter/HCP/Willow by phone for greater than 20 minutes , she shares that [facility], [facility], has already been in contact with the [facility] for initiation of home services but patient was ultimately hospitalized for sob.  Patient has been asked multiple time about advance directives and always refused to sign any documents.  Willow is prepared to act on behalf of her father by established DNR/DNI but does not want her father to know .  I explained that due to the fact patient is compos mentis , we must have a conversation with the patient about these directives and consent for hospice.   Willow verbalized understanding and will discuss further with her family. Perhaps a family meeting with the patient can be established . Palliative will continue to follow

## 2022-11-01 NOTE — CONSULT NOTE ADULT - SUBJECTIVE AND OBJECTIVE BOX
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 w/ lethargy and sob. He was having increased work of breathing at the center. When he came to the ED he was a/o times 3. He was placed on bipap for work of breathing. MICU and cardiology was consulted, given troponin elevation of 280 (is around his baseline from last admission). Collateral obtained per daughters given patient is confused, states no bleeding or melena, and that he had a drop in his hemoglobin around this time. His vbg shows CO2 of 65, w/ compensation of pH. Per daughters, no episodes of fevers, diarrhea, sob, headaches, or falls.     EKG: ventricular paced rhythm, no acute ST elevations.  Chest xray w/ large left pleural effusion and pulm vascular congestion   (25 Oct 2022 01:47)    PERTINENT PM/SXH:   Diabetes Mellitus    Hypertension    CVA (Cerebral Vascular Accident)    Chronic Obstructive Pulmonary Disease (COPD)    Obstructive Sleep Apnea    Mycobacterium Avium-Intracellulare Infection    Deep Vein Thrombosis (DVT)    CHF (congestive heart failure)    Enlarged prostate    GERD (gastroesophageal reflux disease)    Hernia    Calculus of bile duct without cholangitis or cholecystitis without obstruction    Atrial fibrillation    COPD, severe    Chronic systolic congestive heart failure    H/O sick sinus syndrome    Cerebrovascular accident (CVA)    Hypothyroid    BPH (benign prostatic hyperplasia)    Chronic atrial fibrillation      S/P Hernia Repair    S/P cataract surgery, unspecified laterality    S/P ERCP    S/P ERCP    H/O hernia repair      FAMILY HISTORY:  FH: HTN (hypertension) (Father)      Family Hx substance abuse [ ]yes [ ]no  ITEMS NOT CHECKED ARE NOT PRESENT    SOCIAL HISTORY:   Significant other/partner[ x]  Children[ ]  Episcopalian/Spirituality:  Substance hx:  [ ]   Tobacco hx:  [ ]   Alcohol hx: [ ]   Home Opioid hx:  [ ] I-Stop Reference No:  Living Situation: [ x]Home  [ ]Long term care  [ ]Rehab [ ]Other    ADVANCE DIRECTIVES:    DNR/MOLST  [ ]  Living Will  [ ]   DECISION MAKER(s):  [ x] Health Care Proxy(s)  [ ] Surrogate(s)  [ ] Guardian           Name(s): Phone Number(s):  Daughter Willow  [phone]  BASELINE (I)ADL(s) (prior to admission):  [place]: [ ]Total  [ ] Moderate [x ]Dependent    Allergies    No Known Allergies    Intolerances    MEDICATIONS  (STANDING):  aspirin enteric coated 81 milliGRAM(s) Oral daily  atorvastatin 80 milliGRAM(s) Oral at bedtime  BACItracin   Ointment 1 Application(s) Topical daily  buDESOnide    Inhalation Suspension 0.5 milliGRAM(s) Inhalation every 12 hours  collagenase Ointment 1 Application(s) Topical daily  dextrose 5%. 1000 milliLiter(s) (50 mL/Hr) IV Continuous <Continuous>  dextrose 5%. 1000 milliLiter(s) (100 mL/Hr) IV Continuous <Continuous>  dextrose 50% Injectable 25 Gram(s) IV Push once  dextrose 50% Injectable 25 Gram(s) IV Push once  dextrose 50% Injectable 25 Gram(s) IV Push once  dextrose 50% Injectable 12.5 Gram(s) IV Push once  enoxaparin Injectable 90 milliGRAM(s) SubCutaneous every 12 hours  finasteride 5 milliGRAM(s) Oral daily  furosemide   Injectable 40 milliGRAM(s) IV Push two times a day  glucagon  Injectable 1 milliGRAM(s) IntraMuscular once  insulin glargine Injectable (LANTUS) 7 Unit(s) SubCutaneous at bedtime  insulin lispro (ADMELOG) corrective regimen sliding scale   SubCutaneous three times a day before meals  insulin lispro (ADMELOG) corrective regimen sliding scale   SubCutaneous at bedtime  levothyroxine Injectable 20 MICROGram(s) IV Push at bedtime  methylPREDNISolone sodium succinate Injectable 20 milliGRAM(s) IV Push every 6 hours  metoprolol tartrate 50 milliGRAM(s) Oral two times a day  montelukast 10 milliGRAM(s) Oral daily  pantoprazole  Injectable 40 milliGRAM(s) IV Push every 24 hours  polyethylene glycol 3350 17 Gram(s) Oral every 12 hours  tamsulosin 0.4 milliGRAM(s) Oral at bedtime    MEDICATIONS  (PRN):  acetaminophen     Tablet .. 650 milliGRAM(s) Oral every 6 hours PRN Mild Pain (1 - 3), Moderate Pain (4 - 6)  dextrose Oral Gel 15 Gram(s) Oral once PRN Blood Glucose LESS THAN 70 milliGRAM(s)/deciliter    PRESENT SYMPTOMS: [ ]Unable to self-report  [ ] CPOT [ ] PAINADs [ ] RDOS  Source if other than patient:  [ ]Family   [ ]Team     Pain: [ ]yes [ x]no  QOL impact -   Location -                    Aggravating factors -  Quality -  Radiation -  Timing-  Severity (0-10 scale):  Minimal acceptable level (0-10 scale):     CPOT:    https://www.[facility].org/getattachment/[email or web address]-1x4p-1t9u-6679b6185j6r/Critical-Care-Pain-Observation-Tool-(CPOT)    PAIN AD Score:   http://geriatrictoolkit.[facility]/cog/painad.pdf (press ctrl +  left click to view)    Dyspnea:                           [ ]Mild [ ]Moderate [ ]Severe      RDOS:  0 to 2  minimal or no respiratory distress   3  mild distress 3  4 to 6 moderate distress  >7 severe distress  https://homecareinformation.net/handouts/hen/Respiratory_Distress_Observation_Scale.pdf (Ctrl +  left click to view)     Anxiety:                             [ ]Mild [x ]Moderate [ ]Severe  Fatigue:                             [ ]Mild [ x]Moderate [ ]Severe  Nausea:                             [ ]Mild [ ]Moderate [ ]Severe  Loss of appetite:              [ ]Mild [x ]Moderate [ ]Severe  Constipation:                    [ ]Mild [ ]Moderate [ ]Severe    PCSSQ [Palliative Care Spiritual Screening Question]   Severity (0-10):0  Score of 4 or > indicate consideration of Chaplaincy referral.  Chaplaincy Referral: [ ] yes [ ] refused [ ] following deferred xx    Caregiver [place]ale? : [x ] yes [ ] no  Social work referral [ x] Patient & Family Centered Care Referral [ ]     Anticipatory Grief Present?: [x ] yes [ ] no  Social work referral [x ]  Patient & Family Centered Care Referral [ ]       Other Symptoms:  [ x]All other review of systems negative     Palliative Performance Status Version 2:   30      %    http://npcrc.org/files/news/palliative_performance_scale_ppsv2.pdf  PHYSICAL EXAM:  Vital Signs Last 24 Hrs  T(C): 36.6 (01 Nov 2022 14:10), Max: 36.8 (31 Oct 2022 18:15)  T(F): 97.9 (01 Nov 2022 14:10), Max: 98.2 (31 Oct 2022 18:15)  HR: 92 (01 Nov 2022 14:10) (71 - 767)  BP: 150/64 (01 Nov 2022 14:10) (119/58 - 155/58)  BP(mean): --  RR: 25 (01 Nov 2022 14:10) (17 - 25)  SpO2: 95% (01 Nov 2022 14:10) (95% - 99%)    Parameters below as of 01 Nov 2022 14:10  Patient On (Oxygen Delivery Method): nasal cannula  O2 Flow (L/min): 3   I&O's Summary    31 Oct 2022 07:01  -  01 Nov 2022 07:00  --------------------------------------------------------  IN: 1300 mL / OUT: 800 mL / NET: 500 mL    01 Nov 2022 07:01  -  01 Nov 2022 14:57  --------------------------------------------------------  IN: 400 mL / OUT: 1325 mL / NET: -925 mL      GENERAL: [ ]Cachexia    [ x]Alert  [x ]Oriented x2-3   [ ]Lethargic  [ ]Unarousable  [ x]Verbal  [ ]Non-Verbal  Behavioral:   [x ] Anxiety  [ ] Delirium [ ] Agitation [ ] Other  HEENT:  [ ]Normal   [ x]Dry mouth   [ ]ET Tube/Trach  [ ]Oral lesions  PULMONARY:   [ ]Clear [x ]Tachypnea  [x ]Audible excessive secretions   [ x]Rhonchi        [ ]Right [ ]Left [x ]Bilateral  [ ]Crackles        [ ]Right [ ]Left [ ]Bilateral  [ ]Wheezing     [ ]Right [ ]Left [ ]Bilateral  [ ]Diminished breath sounds [ ]right [ ]left [ ]bilateral  CARDIOVASCULAR:    [ ]Regular x[ ]Irregular [ ]Tachy  [ ][place] [ ]Murmur [ ]Other  GASTROINTESTINAL:  [ xSoft  [ ]Distended   [x ]+BS  [x ]Non tender [ ]Tender  [ ]Other [ ]PEG [ ]OGT/ NGT  Last BM:  GENITOURINARY:  [ ]Normal [ x] Incontinent   [ ]Oliguria/Anuria   [ ]Hudson  MUSCULOSKELETAL:   [ ]Normal   [ ]Weakness  [x ]Bed/Wheelchair bound [ ]Edema  NEUROLOGIC:   [ x]No focal deficits  [ ]Cognitive impairment  [ ]Dysphagia [ ]Dysarthria [ ]Paresis [ ]Other   SKIN:   [ ]Normal  [ ]Rash  [ ]Other  [ ]Pressure ulcer(s)       Present on admission [ ]y [ ]n    CRITICAL CARE:  [ ] Shock Present  [ ]Septic [ x]Cardiogenic [ ]Neurologic [ ]Hypovolemic  [ ]  Vasopressors [ ]  Inotropes   [ x]Respiratory failure present [ ]Mechanical ventilation [ ]Non-invasive ventilatory support [ ]High flow    [ ]Acute  [ ]Chronic [ ]Hypoxic  [ ]Hypercarbic [ ]Other  [ x]Other organ failure     LABS:                        8.5    8.18  )-----------( 125      ( 01 Nov 2022 07:34 )             29.5   11-01    142  |  102  |  14  ----------------------------<  143<H>  3.3<L>   |  32<H>  |  0.76    Ca    7.9<L>      01 Nov 2022 07:34  Phos  2.0     11-01  Mg     1.60     11-01    PT/INR - ( 31 Oct 2022 05:40 )   PT: 14.5 sec;   INR: 1.25 ratio         PTT - ( 31 Oct 2022 07:12 )  PTT:36.5 sec      RADIOLOGY & ADDITIONAL STUDIES:    < from: Xray Chest 1 View- PORTABLE-Routine (Xray Chest 1 View- PORTABLE-Routine in AM.) (11.01.22 @ 07:26) >    ACC: [id number] EXAM:  XR CHEST PORTABLE ROUTINE 1V                          PROCEDURE DATE:  11/01/2022          INTERPRETATION:  Chest one view    HISTORY: Postthoracentesis    COMPARISON STUDY: 10/31/2022    Frontal expiratory view of the chest shows the heart to be similarly   enlarged in size. Left cardiac pacemaker is again noted.    The lungs show similar pulmonary congestion with smaller left effusion   and there is no evidence of pneumothorax nor right pleural effusion.    IMPRESSION:  No pneumothorax.        Thank you for the courtesy of this referral.    --- End of Report ---            HORACIO HELMS MD; Attending Interventional Radiologist  This document has been electronically signed. Nov 1 2022  1:06PM    < end of copied text >  < from: CT Chest No Cont (10.25.22 @ 16:56) >    ACC: [id number] EXAM:  CT CHEST                          PROCEDURE DATE:  10/25/2022          INTERPRETATION:  CLINICAL INFORMATION: Follow-up pleural effusion seen on   prior CT.    COMPARISON: CT chest 9/20/2022. CT chest 9/14/2022. Chest x-ray   10/24/2022..    PROCEDURE:  CT scan of the chest was obtained without intravenous contrast.    FINDINGS:    LYMPH NODES: No lymphadenopathy.    HEART/VASCULATURE: Trace pericardial effusion. Aorta and pulmonary artery   are normal in caliber. Left chest wall dual chamber pacemaker with leads   terminating in right atrium and right ventricle. Coronary and aortic   calcifications.    AIRWAYS/LUNGS/PLEURA: Complete opacification of left lung secondary to   pleural effusion and left main bronchial collapse as well as distal   bronchial collapse. Prior studies dating back to 5/3/2022 demonstrate   similar bronchial secretions with impaired clearance, concerning for   endobronchial mass.    Interval increase in size of right sided pleural effusion.    Centrilobular emphysema, similar in appearance to prior CT.    Peripheral tree-in-bud nodular opacities in the right upper and lower   lobes.    UPPER ABDOMEN: Cholelithiasis.     BONES/SOFT TISSUES: Degenerative changes. Old fracture of left 11th and   ninth ribs.    IMPRESSION:    Complete opacification of left lung secondary to pleural effusion and   left main bronchial collapse with complete atelectasis. Prior studies   dating back to 5/3/2022 demonstrate similar endobronchial secretions with   impaired mucus clearance. Bronchoscopy can be performed to evaluate for   endobronchial mass if deemed clinically necessary.    Interval increase in right-sided pleural effusion from prior CT.    Peripheral tree-in-bud nodular opacities in right upper and lower lobe   secondary to mucoid impaction.    Centrilobular emphysema.    --- End of Report ---      < end of copied text >      PROTEIN CALORIE MALNUTRITION PRESENT: [ ]mild [ ]moderate [ ]severe [ ]underweight [x ]morbid obesity  https://www.andeal.org/vault/5680/web/files/ONC/Table_Clinical%20Characteristics%20to%20Document%20Malnutrition-White%20JV%20et%20al%202012.pdf    Height (cm): 172.7 (10-25-22 @ 01:53), 172.7 (09-14-22 @ 11:55), 172.7 (04-22-22 @ 12:23)  Weight (kg): 89.5 (11-01-22 @ 06:10), 86.2 (09-14-22 @ 11:55), 92.4 (04-22-22 @ 12:23)  BMI (kg/m2): 30 (11-01-22 @ 06:10), 28.9 (10-25-22 @ 01:53), 28.9 (09-14-22 @ 11:55)    [ ]PPSV2 < or = to 30% [ ]significant weight loss  [ ]poor nutritional intake  [ ]anasarca[ ]Artificial Nutrition      Other REFERRALS:  [ x]Hospice  [ ]Child Life  [ ]Social Work  [x ]Case management [ ]Holistic Therapy     Goals of Care Document:

## 2022-11-01 NOTE — PROGRESS NOTE ADULT - ASSESSMENT
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

## 2022-11-01 NOTE — CONSULT NOTE ADULT - PROBLEM SELECTOR RECOMMENDATION 9
heis doing  ok  at this time : abg noted with chr hypercapnia: he has large  effusion on left side : he has severe LV dysfunction on echo : he is awaiting ct scan chest : he may need repeat thoracentesis: no chem on computer:  He also had rt leg amputation recently
Left large pleural effusion   Multiple thoracentesis  in setting of end stage CHF   SEvere global left ventricular systolic dysfunction  EF  28%

## 2022-11-01 NOTE — PROGRESS NOTE ADULT - ASSESSMENT
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

## 2022-11-01 NOTE — CHART NOTE - NSCHARTNOTEFT_GEN_A_CORE
[facility] MEDICINE COVERAGE    RRT called by RN as pt removed HFNC desatted to 40s w/ unresponsive episode.  HFNC replaced, pt awoke, SpO2 improved to low 90s - pt now more responsive.  Pt received Lasix 40mg IVP x1 given congested lung sounds 2/2 pleural effusion during RRT.  Pt back to baseline presently (A&Ox3), answering questions when asked.  Pt made aware to please keep HFNC on as it is supporting his breathing.    Vital Signs Last 24 Hrs  T(C): 36.7 (01 Nov 2022 17:50), Max: 36.7 (01 Nov 2022 17:50)  T(F): 98.1 (01 Nov 2022 17:50), Max: 98.1 (01 Nov 2022 17:50)  HR: 98 (01 Nov 2022 17:50) (71 - 100)  BP: 148/60 (01 Nov 2022 17:50) (119/58 - 155/58)  RR: 20 (01 Nov 2022 17:50) (17 - 25)  SpO2: 100% (01 Nov 2022 17:50) (82% - 100%)    O2 Parameters below as of 01 Nov 2022 19:40  Patient On (Oxygen Delivery Method): nasal cannula, high flow    Plan:  -D/w RN and floor staff to maintain HFNC, c/w aggressive chest PT, suctioning PRN, airway clearance, standing Duonebs, and remainder of pulmonary treatment.  -Monitor urine output given pt is still receiving IV diuresis.  -Attending, Dr. Heller, made aware.    Contacted pt's daughter Nola [phone] to provide update, made her aware of event and that the pt is hemodynamically stable presently following improvement in oxygen saturation.  She stated that her mother and sister (Willow) are visiting pt tomorrow.  Recommend a family meeting for ongoing goals of care discussion.  All questions answered over telephone.    Pt stable at this time, will continue to monitor.    Martir Torres PA-C  Department of Medicine - [facility]  In-House Pager: #[phone]

## 2022-11-01 NOTE — PROGRESS NOTE ADULT - SUBJECTIVE AND OBJECTIVE BOX
[facility] - S [facility] / Ricci S /D Nora/ S Antonia/ SHAHANA Jang/ Jeremi Lee / KENDALL Haynesu/ O Antwon  service -[phone], office [phone]  ---------------------------------------------------------------------------------------------------------------    Patient seen and examined bedside    Subjective and Objective: No overnight events, sob resolved. No complaints today. feeling better    Allergies: No Known Allergies      Hospital Medications:   MEDICATIONS  (STANDING):  aspirin enteric coated 81 milliGRAM(s) Oral daily  atorvastatin 80 milliGRAM(s) Oral at bedtime  BACItracin   Ointment 1 Application(s) Topical daily  buDESOnide    Inhalation Suspension 0.5 milliGRAM(s) Inhalation every 12 hours  collagenase Ointment 1 Application(s) Topical daily  dextrose 5%. 1000 milliLiter(s) (50 mL/Hr) IV Continuous <Continuous>  dextrose 5%. 1000 milliLiter(s) (100 mL/Hr) IV Continuous <Continuous>  dextrose 50% Injectable 25 Gram(s) IV Push once  dextrose 50% Injectable 25 Gram(s) IV Push once  dextrose 50% Injectable 12.5 Gram(s) IV Push once  dextrose 50% Injectable 25 Gram(s) IV Push once  enoxaparin Injectable 90 milliGRAM(s) SubCutaneous every 12 hours  finasteride 5 milliGRAM(s) Oral daily  furosemide   Injectable 40 milliGRAM(s) IV Push two times a day  glucagon  Injectable 1 milliGRAM(s) IntraMuscular once  insulin glargine Injectable (LANTUS) 7 Unit(s) SubCutaneous at bedtime  insulin lispro (ADMELOG) corrective regimen sliding scale   SubCutaneous three times a day before meals  insulin lispro (ADMELOG) corrective regimen sliding scale   SubCutaneous at bedtime  levothyroxine Injectable 20 MICROGram(s) IV Push at bedtime  metoprolol tartrate 50 milliGRAM(s) Oral two times a day  montelukast 10 milliGRAM(s) Oral daily  pantoprazole  Injectable 40 milliGRAM(s) IV Push every 24 hours  polyethylene glycol 3350 17 Gram(s) Oral every 12 hours  tamsulosin 0.4 milliGRAM(s) Oral at bedtime      REVIEW OF SYSTEMS:  CONSTITUTIONAL: No weakness, fevers or chills  EYES/ENT: No visual changes;  No vertigo or throat pain   NECK: No pain or stiffness  RESPIRATORY: No cough, wheezing, hemoptysis; No shortness of breath  CARDIOVASCULAR: No chest pain or palpitations.  GASTROINTESTINAL: No abdominal or epigastric pain. No nausea, vomiting, or hematemesis; No diarrhea or constipation. No melena or hematochezia.  GENITOURINARY: No dysuria, frequency, foamy urine, urinary urgency, incontinence or hematuria  NEUROLOGICAL: No numbness or weakness  SKIN: No itching, burning, rashes, or lesions   VASCULAR: No bilateral lower extremity edema.   All other review of systems is negative unless indicated above.    VITALS:  T(F): 97.5 (11-01-22 @ 10:10), Max: 98.2 (10-31-22 @ 18:15)  HR: 94 (11-01-22 @ 10:10)  BP: 130/58 (11-01-22 @ 10:10)  RR: 20 (11-01-22 @ 10:10)  SpO2: 95% (11-01-22 @ 10:10)  Wt(kg): --    10-31 @ 07:01  -  11-01 @ 07:00  --------------------------------------------------------  IN: 1300 mL / OUT: 800 mL / NET: 500 mL        Weight (kg): 89.5 (11-01 @ 06:10)    PHYSICAL EXAM:  Constitutional: NAD  HEENT: anicteric sclera, oropharynx clear  Neck: No JVD  Respiratory: CTAB, no wheezes, rales or rhonchi  Cardiovascular: S1, S2, RRR  Gastrointestinal: BS+, soft, NT/ND  Extremities: No cyanosis or clubbing. No peripheral edema  Neurological: A/O x 3, no focal deficits  Psychiatric: Normal mood, normal affect  : No CVA tenderness. No hudson.   Skin: No rashes  Vascular Access:    LABS:  11-01    142  |  102  |  14  ----------------------------<  143<H>  3.3<L>   |  32<H>  |  0.76    Ca    7.9<L>      01 Nov 2022 07:34  Phos  2.0     11-01  Mg     1.60     11-01      Creatinine Trend: 0.76 <--, 0.91 <--, 0.88 <--, 0.90 <--, 0.89 <--, 0.94 <--, 0.98 <--, 0.92 <--, 0.89 <--, 1.00 <--, 0.97 <--, 1.06 <--, 1.11 <--, 1.04 <--, 0.93 <--, 1.08 <--                        8.5    8.18  )-----------( 125      ( 01 Nov 2022 07:34 )             29.5     Urine Studies:        RADIOLOGY & ADDITIONAL STUDIES:   [facility] - S [facility] / Ricci S /D Nora/ S Antonia/ SHAHANA Jang/ Jeremi Lee / KENDALL Haynesu/ O Antwon  service -[phone], office [phone]  ---------------------------------------------------------------------------------------------------------------    Patient seen and examined bedside    Subjective and Objective: No overnight events,     Allergies: No Known Allergies      Hospital Medications:   MEDICATIONS  (STANDING):  aspirin enteric coated 81 milliGRAM(s) Oral daily  atorvastatin 80 milliGRAM(s) Oral at bedtime  BACItracin   Ointment 1 Application(s) Topical daily  buDESOnide    Inhalation Suspension 0.5 milliGRAM(s) Inhalation every 12 hours  collagenase Ointment 1 Application(s) Topical daily  dextrose 5%. 1000 milliLiter(s) (50 mL/Hr) IV Continuous <Continuous>  dextrose 5%. 1000 milliLiter(s) (100 mL/Hr) IV Continuous <Continuous>  dextrose 50% Injectable 25 Gram(s) IV Push once  dextrose 50% Injectable 25 Gram(s) IV Push once  dextrose 50% Injectable 12.5 Gram(s) IV Push once  dextrose 50% Injectable 25 Gram(s) IV Push once  enoxaparin Injectable 90 milliGRAM(s) SubCutaneous every 12 hours  finasteride 5 milliGRAM(s) Oral daily  furosemide   Injectable 40 milliGRAM(s) IV Push two times a day  glucagon  Injectable 1 milliGRAM(s) IntraMuscular once  insulin glargine Injectable (LANTUS) 7 Unit(s) SubCutaneous at bedtime  insulin lispro (ADMELOG) corrective regimen sliding scale   SubCutaneous three times a day before meals  insulin lispro (ADMELOG) corrective regimen sliding scale   SubCutaneous at bedtime  levothyroxine Injectable 20 MICROGram(s) IV Push at bedtime  metoprolol tartrate 50 milliGRAM(s) Oral two times a day  montelukast 10 milliGRAM(s) Oral daily  pantoprazole  Injectable 40 milliGRAM(s) IV Push every 24 hours  polyethylene glycol 3350 17 Gram(s) Oral every 12 hours  tamsulosin 0.4 milliGRAM(s) Oral at bedtime      REVIEW OF SYSTEMS:  unable to obtain FROM PT    VITALS:  T(F): 97.5 (11-01-22 @ 10:10), Max: 98.2 (10-31-22 @ 18:15)  HR: 94 (11-01-22 @ 10:10)  BP: 130/58 (11-01-22 @ 10:10)  RR: 20 (11-01-22 @ 10:10)  SpO2: 95% (11-01-22 @ 10:10)  Wt(kg): --    10-31 @ 07:01  -  11-01 @ 07:00  --------------------------------------------------------  IN: 1300 mL / OUT: 800 mL / NET: 500 mL      Weight (kg): 89.5 (11-01 @ 06:10)    PHYSICAL EXAM:  Constitutional: NAD  HEENT: anicteric sclera  Neck: No JVD  Respiratory: bibasila rcrepts, no wheezes  Cardiovascular: S1, S2, RRR  Gastrointestinal: BS+, soft, NT/ND  Extremities: +lle pedal edema. rt AKA+   Neurological: awake  Psychiatric: Normal mood, normal affect  : + hudson.     LABS:  11-01    142  |  102  |  14  ----------------------------<  143<H>  3.3<L>   |  32<H>  |  0.76    Ca    7.9<L>      01 Nov 2022 07:34  Phos  2.0     11-01  Mg     1.60     11-01      Creatinine Trend: 0.76 <--, 0.91 <--, 0.88 <--, 0.90 <--, 0.89 <--, 0.94 <--, 0.98 <--, 0.92 <--, 0.89 <--, 1.00 <--, 0.97 <--, 1.06 <--, 1.11 <--, 1.04 <--, 0.93 <--, 1.08 <--                        8.5    8.18  )-----------( 125      ( 01 Nov 2022 07:34 )             29.5     Urine Studies:        RADIOLOGY & ADDITIONAL STUDIES:

## 2022-11-01 NOTE — CONSULT NOTE ADULT - CONSULT REASON
Hypernatremia
Lethargy
Bilateral pleural effusion
chf
left foot wounds
sob:  daughter also contributed to the history
Goals of care

## 2022-11-01 NOTE — CHART NOTE - NSCHARTNOTEFT_GEN_A_CORE
Provider contacted by RN this morning as patient with unilateral LUE edema, rhonchi on auscultation.    Patient on Lasix prior, however, was held prior to thoracentesis 10/31, will continue now. Patient also refusing BiPAP overnight, VBG ordered. LUE Duplex to rule out DVT ordered, as patient with unilateral edema, however, less likely DVT as patient has been on therapeutic dose Lovenox.    Per discussion with medicine attending, palliative consulted for GOC.
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
## 2022-11-01 NOTE — PROGRESS NOTE ADULT - SUBJECTIVE AND OBJECTIVE BOX
CARDIOLOGY FOLLOW UP - Dr. Stephens  Date of Service: 11/1/2022  CC: events noted    Review of Systems:  Constitutional: No fever, weight loss, or fatigue  Respiratory: No cough, wheezing, or hemoptysis, no shortness of breath  Cardiovascular: No chest pain, palpitations, passing out, dizziness, or leg swelling  Gastrointestinal: No abd or epigastric pain. No nausea, vomiting, or hematemesis; no diarrhea or consiptaiton, no melena or hematochezia  Vascular: No edema     TELEMETRY:    PHYSICAL EXAM:  T(C): 36.4 (11-01-22 @ 10:10), Max: 36.8 (10-31-22 @ 18:15)  HR: 94 (11-01-22 @ 10:10) (71 - 767)  BP: 130/58 (11-01-22 @ 10:10) (119/58 - 155/58)  RR: 20 (11-01-22 @ 10:10) (17 - 20)  SpO2: 95% (11-01-22 @ 10:10) (95% - 100%)  Wt(kg): --  I&O's Summary    31 Oct 2022 07:01  -  01 Nov 2022 07:00  --------------------------------------------------------  IN: 1300 mL / OUT: 800 mL / NET: 500 mL        Appearance: Normal	  Cardiovascular: Normal S1 S2,RRR, No JVD, No murmurs  Respiratory: Lungs clear to auscultation	  Gastrointestinal:  Soft, Non-tender, + BS	  Extremities: Normal range of motion, No clubbing, cyanosis or edema  Vascular: Peripheral pulses palpable 2+ bilaterally       Home Medications:  acetaminophen 325 mg oral tablet: 2 tab(s) orally every 6 hours, As needed, Moderate Pain (4 - 6) (23 Sep 2022 16:43)  acetylcysteine 20% inhalation solution: 4 milliliter(s) inhaled 4 times a day as needed (25 Oct 2022 10:37)  acetylcysteine 20% inhalation solution: 4 milliliter(s) inhaled 3 times a day (23 Sep 2022 16:43)  aspirin 81 mg oral delayed release tablet: 1 tab(s) orally once a day (23 Sep 2022 16:43)  Aspirin Enteric Coated 81 mg oral delayed release tablet: 1 tab(s) orally once a day (25 Oct 2022 10:37)  atorvastatin 80 mg oral tablet: 1 tab(s) orally once a day (at bedtime) (23 Sep 2022 16:43)  bacitracin 500 units/g topical ointment: Apply topically to affected area twice to back (25 Oct 2022 10:37)  budesonide 0.5 mg/2 mL inhalation suspension: 0.5 milligram(s) inhaled 2 times a day (14 Sep 2022 19:13)  budesonide 0.5 mg/2 mL inhalation suspension: 2 milliliter(s) inhaled 2 times a day (25 Oct 2022 10:37)  cadexomer iodine 0.9% topical gel: 1 application topically once a day (23 Sep 2022 16:43)  Coumadin 3 mg oral tablet: 1 tab(s) orally once a day (at bedtime) (23 Sep 2022 16:43)  Coumadin 4 mg oral tablet: 1 tab(s) orally once a day (25 Oct 2022 10:37)  finasteride 5 mg oral tablet: 1 tab(s) orally once a day (14 Sep 2022 19:13)  finasteride 5 mg oral tablet: 1 tab(s) orally once a day (25 Oct 2022 10:37)  Flomax 0.4 mg oral capsule: 1 cap(s) orally once a day (25 Oct 2022 10:37)  gabapentin 100 mg oral capsule: 1 tab(s) orally 2 times a day (25 Oct 2022 10:37)  gabapentin 100 mg oral tablet: 1 tab(s) orally 2 times a day (14 Sep 2022 19:13)  guaifenesin-dextromethorphan 100 mg-10 mg/5 mL oral liquid: 10 milliliter(s) orally every 6 hours (23 Sep 2022 16:43)  insulin glargine 100 units/mL subcutaneous solution: 10 unit(s) subcutaneous once a day (at bedtime) (23 Sep 2022 16:43)  insulin lispro 100 units/mL injectable solution: 14 unit(s) injectable once a day before dinner (23 Sep 2022 18:00)  insulin lispro 100 units/mL injectable solution: 14 unit(s) injectable once a day before lunch (23 Sep 2022 18:00)  insulin lispro 100 units/mL injectable solution: 20 unit(s) injectable once a day before Breakfast (23 Sep 2022 18:00)  insulin lispro 100 units/mL subcutaneous solution: 14 unit(s) subcutaneous before lunch and dinner (25 Oct 2022 10:37)  Iodosorb 0.9% topical gel: to heel (25 Oct 2022 10:37)  ipratropium: inhalation as needed (25 Oct 2022 10:37)  ipratropium-albuterol 0.5 mg-2.5 mg/3 mL inhalation solution: 3 milliliter(s) inhaled every 6 hours (23 Sep 2022 16:43)  Lantus 100 units/mL subcutaneous solution: 10 unit(s) subcutaneous once a day (at bedtime) (25 Oct 2022 10:37)  Lasix 20 mg oral tablet: 1 tab(s) orally once a day (25 Oct 2022 10:37)  Lipitor 80 mg oral tablet: 1 tab(s) orally once a day (25 Oct 2022 10:37)  medihoney:  (25 Oct 2022 10:37)  metoprolol tartrate 50 mg oral tablet: 1 tab(s) orally 2 times a day (23 Sep 2022 16:43)  Metoprolol Tartrate 50 mg oral tablet: 1 tab(s) orally 2 times a day (25 Oct 2022 10:37)  montelukast 10 mg oral tablet: 1 tab(s) orally once a day (at bedtime) (23 Sep 2022 16:43)  Multiple Vitamins with Minerals oral tablet: 1 tab(s) orally once a day (23 Sep 2022 16:43)  pantoprazole 40 mg oral delayed release tablet: 1 tab(s) orally once a day (before a meal) (23 Sep 2022 16:43)  polyethylene glycol 3350 oral powder for reconstitution: 17 gram(s) orally once a day (23 Sep 2022 16:43)  predniSONE 50 mg oral tablet: 1 tab(s) orally once a day for one more dose on 9/24/22 then D/C (23 Sep 2022 16:43)  Protonix 40 mg oral delayed release tablet: 1 tab(s) orally once a day (25 Oct 2022 10:37)  senna leaf extract oral tablet: 2 tab(s) orally once a day (at bedtime) (23 Sep 2022 16:43)  Singulair 10 mg oral tablet: 1 tab(s) orally once a day (25 Oct 2022 10:37)  sodium chloride 3% inhalation solution: 4 milliliter(s) inhaled every 12 hours (23 Sep 2022 16:43)  Synthroid 25 mcg (0.025 mg) oral tablet: 1 tab(s) orally once a day (25 Oct 2022 10:37)  Synthroid 25 mcg (0.025 mg) oral tablet: 1 tab(s) orally once a day (14 Sep 2022 19:13)  tamsulosin 0.4 mg oral capsule: 2 cap(s) orally once a day (at bedtime) (14 Sep 2022 19:13)  zinc oxide topical cream: Apply topically to affected area twice to AKA (25 Oct 2022 10:37)        MEDICATIONS  (STANDING):  aspirin enteric coated 81 milliGRAM(s) Oral daily  atorvastatin 80 milliGRAM(s) Oral at bedtime  BACItracin   Ointment 1 Application(s) Topical daily  buDESOnide    Inhalation Suspension 0.5 milliGRAM(s) Inhalation every 12 hours  collagenase Ointment 1 Application(s) Topical daily  dextrose 5%. 1000 milliLiter(s) (50 mL/Hr) IV Continuous <Continuous>  dextrose 5%. 1000 milliLiter(s) (100 mL/Hr) IV Continuous <Continuous>  dextrose 50% Injectable 25 Gram(s) IV Push once  dextrose 50% Injectable 25 Gram(s) IV Push once  dextrose 50% Injectable 12.5 Gram(s) IV Push once  dextrose 50% Injectable 25 Gram(s) IV Push once  enoxaparin Injectable 90 milliGRAM(s) SubCutaneous every 12 hours  finasteride 5 milliGRAM(s) Oral daily  furosemide   Injectable 40 milliGRAM(s) IV Push two times a day  glucagon  Injectable 1 milliGRAM(s) IntraMuscular once  insulin glargine Injectable (LANTUS) 7 Unit(s) SubCutaneous at bedtime  insulin lispro (ADMELOG) corrective regimen sliding scale   SubCutaneous three times a day before meals  insulin lispro (ADMELOG) corrective regimen sliding scale   SubCutaneous at bedtime  levothyroxine Injectable 20 MICROGram(s) IV Push at bedtime  metoprolol tartrate 50 milliGRAM(s) Oral two times a day  montelukast 10 milliGRAM(s) Oral daily  pantoprazole  Injectable 40 milliGRAM(s) IV Push every 24 hours  polyethylene glycol 3350 17 Gram(s) Oral every 12 hours  tamsulosin 0.4 milliGRAM(s) Oral at bedtime        EKG:  RADIOLOGY:  DIAGNOSTIC TESTING:  [ ] Echocardiogram:  [ ] Catherterization:  [ ] Stress Test:  OTHER:     LABS:	 	                          8.5    8.18  )-----------( 125      ( 01 Nov 2022 07:34 )             29.5     11-01    142  |  102  |  14  ----------------------------<  143<H>  3.3<L>   |  32<H>  |  0.76    Ca    7.9<L>      01 Nov 2022 07:34  Phos  2.0     11-01  Mg     1.60     11-01        PT/INR - ( 31 Oct 2022 05:40 )   PT: 14.5 sec;   INR: 1.25 ratio         PTT - ( 31 Oct 2022 07:12 )  PTT:36.5 sec    CARDIAC MARKERS:

## 2022-11-01 NOTE — PROGRESS NOTE ADULT - SUBJECTIVE AND OBJECTIVE BOX
Date of Service: 11-01-22 @ 14:41    Patient is a 87y old  Male who presents with a chief complaint of Acute hypoxic respiratory failure (01 Nov 2022 13:03)      Any change in ROS: tachypneic  sob:      MEDICATIONS  (STANDING):  aspirin enteric coated 81 milliGRAM(s) Oral daily  atorvastatin 80 milliGRAM(s) Oral at bedtime  BACItracin   Ointment 1 Application(s) Topical daily  buDESOnide    Inhalation Suspension 0.5 milliGRAM(s) Inhalation every 12 hours  collagenase Ointment 1 Application(s) Topical daily  dextrose 5%. 1000 milliLiter(s) (50 mL/Hr) IV Continuous <Continuous>  dextrose 5%. 1000 milliLiter(s) (100 mL/Hr) IV Continuous <Continuous>  dextrose 50% Injectable 25 Gram(s) IV Push once  dextrose 50% Injectable 25 Gram(s) IV Push once  dextrose 50% Injectable 25 Gram(s) IV Push once  dextrose 50% Injectable 12.5 Gram(s) IV Push once  enoxaparin Injectable 90 milliGRAM(s) SubCutaneous every 12 hours  finasteride 5 milliGRAM(s) Oral daily  furosemide   Injectable 40 milliGRAM(s) IV Push two times a day  glucagon  Injectable 1 milliGRAM(s) IntraMuscular once  insulin glargine Injectable (LANTUS) 7 Unit(s) SubCutaneous at bedtime  insulin lispro (ADMELOG) corrective regimen sliding scale   SubCutaneous three times a day before meals  insulin lispro (ADMELOG) corrective regimen sliding scale   SubCutaneous at bedtime  levothyroxine Injectable 20 MICROGram(s) IV Push at bedtime  metoprolol tartrate 50 milliGRAM(s) Oral two times a day  montelukast 10 milliGRAM(s) Oral daily  pantoprazole  Injectable 40 milliGRAM(s) IV Push every 24 hours  polyethylene glycol 3350 17 Gram(s) Oral every 12 hours  tamsulosin 0.4 milliGRAM(s) Oral at bedtime    MEDICATIONS  (PRN):  acetaminophen     Tablet .. 650 milliGRAM(s) Oral every 6 hours PRN Mild Pain (1 - 3), Moderate Pain (4 - 6)  albuterol/ipratropium for Nebulization 3 milliLiter(s) Nebulizer every 6 hours PRN Shortness of Breath and/or Wheezing  dextrose Oral Gel 15 Gram(s) Oral once PRN Blood Glucose LESS THAN 70 milliGRAM(s)/deciliter    Vital Signs Last 24 Hrs  T(C): 36.4 (01 Nov 2022 10:10), Max: 36.8 (31 Oct 2022 18:15)  T(F): 97.5 (01 Nov 2022 10:10), Max: 98.2 (31 Oct 2022 18:15)  HR: 94 (01 Nov 2022 10:10) (71 - 767)  BP: 130/58 (01 Nov 2022 10:10) (119/58 - 155/58)  BP(mean): --  RR: 20 (01 Nov 2022 10:10) (17 - 20)  SpO2: 95% (01 Nov 2022 10:10) (95% - 99%)    Parameters below as of 01 Nov 2022 10:10  Patient On (Oxygen Delivery Method): nasal cannula  O2 Flow (L/min): 3      I&O's Summary    31 Oct 2022 07:01  -  01 Nov 2022 07:00  --------------------------------------------------------  IN: 1300 mL / OUT: 800 mL / NET: 500 mL    01 Nov 2022 07:01  -  01 Nov 2022 14:41  --------------------------------------------------------  IN: 400 mL / OUT: 1325 mL / NET: -925 mL          Physical Exam:   GENERAL: obese  HEENT: PETRONA/   Atraumatic, Normocephalic  ENMT: No tonsillar erythema, exudates, or enlargement; Moist mucous membranes, Good dentition, No lesions  NECK: Supple, No JVD, Normal thyroid  CHEST/LUNG: coarse breath sounds  with cracekls  CVS: Regular rate and rhythm; No murmurs, rubs, or gallops  GI: : Soft, Nontender, Nondistended; Bowel sounds present  NERVOUS SYSTEM:  Alert & Oriented X3  EXTREMITIES+ edema  LYMPH: No lymphadenopathy noted  SKIN: No rashes or lesions  ENDOCRINOLOGY: No Thyromegaly  PSYCH: Appropriate    Labs:  34                            8.5    8.18  )-----------( 125      ( 01 Nov 2022 07:34 )             29.5                         8.5    7.70  )-----------( 115      ( 31 Oct 2022 07:12 )             31.7                         7.9    6.03  )-----------( 115      ( 31 Oct 2022 05:40 )             27.5                         9.0    7.44  )-----------( 137      ( 30 Oct 2022 04:55 )             31.4                         8.9    6.97  )-----------( 140      ( 29 Oct 2022 09:27 )             31.4     11-01    142  |  102  |  14  ----------------------------<  143<H>  3.3<L>   |  32<H>  |  0.76  10-31    147<H>  |  102  |  23  ----------------------------<  75  4.6   |  27  |  0.91  10-31    145  |  105  |  18  ----------------------------<  164<H>  3.3<L>   |  32<H>  |  0.88  10-30    147<H>  |  106  |  20  ----------------------------<  211<H>  3.5   |  39<H>  |  0.90  10-30    146<H>  |  105  |  22  ----------------------------<  150<H>  3.3<L>   |  35<H>  |  0.89  10-29    154<H>  |  110<H>  |  24<H>  ----------------------------<  148<H>  3.7   |  35<H>  |  0.94  10-28    147<H>  |  105  |  26<H>  ----------------------------<  119<H>  3.4<L>   |  34<H>  |  0.98    Ca    7.9<L>      01 Nov 2022 07:34  Ca    8.2<L>      31 Oct 2022 07:12  Ca    8.0<L>      31 Oct 2022 05:40  Ca    8.1<L>      30 Oct 2022 20:22  Phos  2.0     11-01  Phos  2.2     10-31  Mg     1.60     11-01  Mg     1.70     10-31      CAPILLARY BLOOD GLUCOSE      POCT Blood Glucose.: 158 mg/dL (01 Nov 2022 12:29)  POCT Blood Glucose.: 132 mg/dL (01 Nov 2022 08:54)  POCT Blood Glucose.: 245 mg/dL (31 Oct 2022 21:39)  POCT Blood Glucose.: 153 mg/dL (31 Oct 2022 17:33)        PT/INR - ( 31 Oct 2022 05:40 )   PT: 14.5 sec;   INR: 1.25 ratio         PTT - ( 31 Oct 2022 07:12 )  PTT:36.5 sec    Fluid Source --  Albumin, Fluid1.2 g/dL  Glucose, [id number] mg/dL  Protein total, Fluid2.5 g/dL  Lacatate Dehydrogenase, [id number] U/L  pH, Fluid8.4  Cytopathology-Non Gyn Report--        RECENT CULTURES:  10-31 @ 12:48 Pleural Fl Pleural Fluid       polymorphonuclear leukocytes seen  No organisms seen  by cytocentrifuge           Culture in progress          RESPIRATORY CULTURES:        ra< from: Xray Chest 1 View- PORTABLE-Routine (Xray Chest 1 View- PORTABLE-Routine in AM.) (11.01.22 @ 07:26) >    ACC: [id number] EXAM:  XR CHEST PORTABLE ROUTINE 1V                          PROCEDURE DATE:  11/01/2022          INTERPRETATION:  Chest one view    HISTORY: Postthoracentesis    COMPARISON STUDY: 10/31/2022    Frontal expiratory view of the chest shows the heart to be similarly   enlarged in size. Left cardiac pacemaker is again noted.    The lungs show similar pulmonary congestion with smaller left effusion   and there is no evidence of pneumothorax nor right pleural effusion.    IMPRESSION:  No pneumothorax.        Thank you for the courtesy of this referral.    --- End of Report ---            HORACIO HELMS MD; Attending Interventional Radiologist  This document has been electronically signed. Nov 1 2022  1:06PM    < end of copied text >    Studies  Chest X-RAY  CT SCAN Chest   Venous Dopplers: LE:   CT Abdomen  Others

## 2022-11-01 NOTE — PROGRESS NOTE ADULT - NSPROGADDITIONALINFOA_GEN_ALL_CORE
paged acp : awaiting response:    10/28: dw acp and thoracic NP    10/29: paged acp    10/30: dw acp    10/31: dw acp    11/1 : dc acp

## 2022-11-01 NOTE — CONSULT NOTE ADULT - CONSULT REQUESTED DATE/TIME
25-Oct-2022 00:23
25-Oct-2022 12:38
01-Nov-2022 14:00
25-Oct-2022 23:16
26-Oct-2022 10:35
25-Oct-2022 18:39
25-Oct-2022 14:28

## 2022-11-01 NOTE — CONSULT NOTE ADULT - REASON FOR ADMISSION
Acute hypoxic respiratory failure
SOB
Acute hypoxic respiratory failure

## 2022-11-01 NOTE — PROGRESS NOTE ADULT - PROBLEM SELECTOR PLAN 1
- 2/2 large pleural effusion and pulm vasc congestion  - troponin elevation likely 2/2 CHF exacerbation (stable x 3 sets). card following   - TTE: Severe global left ventricular systolic dysfunction. EF 28%  - tele monitoring: no events. paced rhythm.   - c/w Lasix 40 mg iv BID, has chronic Adair for strict ins and outs.  - pulm consult: Dr. Felton.   - CT surgery on the case for thoracentesis (INR 2.5, procedure canceled)  - vit K PO x 1 for elevated INR in anticipation for thoracentesis, which was done 10/31/22: s/p 1500 cc fluid removed.   - Lovenox 90 mg SQ BID (weight based) bridge, restarted post procedure.   - will not bridge coumadin yet since pt may need Pleurx if fluid reaccumulate

## 2022-11-01 NOTE — CHART NOTE - NSCHARTNOTEFT_GEN_A_CORE
Chest vest ordered, but per RN who spoke with respiratory care, no chest vest available currently. RN to continue with aggressive manual chest PT until chest vest becomes available.

## 2022-11-01 NOTE — PROGRESS NOTE ADULT - ASSESSMENT
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 Renal following for Hypernatremia Mx    Hypernatremia 2/2 free water deficit - resolving  Na 143>147 >154 >146 >142 today -improving  nml renal function   bp stable  hypokalemia noted  mild hypomagnesemia    s/p Lasix 40mg IV BID-held 10/29 2/2 worsening Na- now resumed. agree  replete kcl po/iv and magso4 ivpb prn  s/p IVF D5W @75ml/hr x12hrs, Na level betetr. watch off ivf. high risk for chf   on pureed diet   BMP qdaily    Acute respiratory failure with hypoxia 2/2 large pleural effusion and pulm vasc congestion  -CMP - TTE: Severe global left ventricular systolic dysfunction. EF 28%  -tele monitoring  - Lasix 40 mg iv BID, now resumed  -bipap 14/7 w/ fio2 60%  - f/u pulm   - f/u CT surgery eval for thoracentesis  optimized renal stand point for procedure/OR    agree w/ POC eval  will closely follow up.   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 Renal following for Hypernatremia Mx    Hypernatremia 2/2 free water deficit - resolving  Na 143>147 >154 >146 >142 today -improving  nml renal function   bp stable  hypokalemia noted  mild hypomagnesemia    s/p Lasix 40mg IV BID-held 10/29 2/2 worsening Na- now resumed. agree  replete kcl po/iv and magso4 ivpb prn  s/p IVF D5W @75ml/hr x12hrs, Na level betetr. watch off ivf. high risk for chf   on pureed diet   BMP qdaily    Acute respiratory failure with hypoxia 2/2 large pleural effusion and pulm vasc congestion  -CMP - TTE: Severe global left ventricular systolic dysfunction. EF 28%  -tele monitoring  - Lasix 40 mg iv BID, now resumed  -bipap 14/7 w/ fio2 60%  - f/u pulm   - f/u CT surgery eval for thoracentesis  optimized renal stand point for procedure/OR    agree w/ Palliative care eval  will closely follow up.   labs, chart reviewed  For any question, pl call:  Nephrology  Cell -[phone]  Office [phone]  Ans Serv [phone]

## 2022-11-01 NOTE — PROGRESS NOTE ADULT - SUBJECTIVE AND OBJECTIVE BOX
SUBJECTIVE/ OVERNIGHT EVENTS:  Pt seen and examined earlier today.   overnight events reviewed.  increased work of breathing, placed on bipap but refusing.  this Am more comfortable, still with congestions and cough.  with accessary muscle use.  the nephew at bedside.   seen by pulm, chest vest ordered. steroids started.  if refusing bipap, then trial of hi-flow.   The daughter Willow HCP requests palliative eval. Consulted.   The daughter Willow asking for DNR/DNI. wishes that he will be going home with home hospice once he is a little better optimized.   d/w palliative team Delmy.  Diuretics dose increased.     Pt denied cp, sob, n/v/d.   no HA, no dizziness.   no melena, no hematochezia. no BRBPR.   no dysuria, no urinary urgency/frequency. no bowel/bladder incontinence.       --------------------------------------------------------------------------------------------  LABS:                        8.5    8.18  )-----------( 125      ( 01 Nov 2022 07:34 )             29.5     11-01    142  |  102  |  14  ----------------------------<  143<H>  3.3<L>   |  32<H>  |  0.76    Ca    7.9<L>      01 Nov 2022 07:34  Phos  2.0     11-01  Mg     1.60     11-01    TPro  5.6<L>  /  Alb  x   /  TBili  x   /  DBili  x   /  AST  x   /  ALT  x   /  AlkPhos  x   11-01    PT/INR - ( 31 Oct 2022 05:40 )   PT: 14.5 sec;   INR: 1.25 ratio         PTT - ( 31 Oct 2022 07:12 )  PTT:36.5 sec  CAPILLARY BLOOD GLUCOSE      POCT Blood Glucose.: 214 mg/dL (01 Nov 2022 18:58)  POCT Blood Glucose.: 165 mg/dL (01 Nov 2022 17:36)  POCT Blood Glucose.: 158 mg/dL (01 Nov 2022 12:29)  POCT Blood Glucose.: 132 mg/dL (01 Nov 2022 08:54)  POCT Blood Glucose.: 245 mg/dL (31 Oct 2022 21:39)            RADIOLOGY & ADDITIONAL TESTS:    Imaging Personally Reviewed:  [x] YES  [ ] NO    Consultant(s) Notes Reviewed:  [x] YES  [ ] NO    MEDICATIONS  (STANDING):  albuterol/ipratropium for Nebulization 3 milliLiter(s) Nebulizer every 6 hours  aspirin enteric coated 81 milliGRAM(s) Oral daily  atorvastatin 80 milliGRAM(s) Oral at bedtime  BACItracin   Ointment 1 Application(s) Topical daily  buDESOnide    Inhalation Suspension 0.5 milliGRAM(s) Inhalation every 12 hours  collagenase Ointment 1 Application(s) Topical daily  dextrose 5%. 1000 milliLiter(s) (50 mL/Hr) IV Continuous <Continuous>  dextrose 5%. 1000 milliLiter(s) (100 mL/Hr) IV Continuous <Continuous>  dextrose 50% Injectable 25 Gram(s) IV Push once  dextrose 50% Injectable 12.5 Gram(s) IV Push once  dextrose 50% Injectable 25 Gram(s) IV Push once  dextrose 50% Injectable 25 Gram(s) IV Push once  enoxaparin Injectable 90 milliGRAM(s) SubCutaneous every 12 hours  finasteride 5 milliGRAM(s) Oral daily  furosemide   Injectable 40 milliGRAM(s) IV Push two times a day  glucagon  Injectable 1 milliGRAM(s) IntraMuscular once  insulin glargine Injectable (LANTUS) 7 Unit(s) SubCutaneous at bedtime  insulin lispro (ADMELOG) corrective regimen sliding scale   SubCutaneous three times a day before meals  insulin lispro (ADMELOG) corrective regimen sliding scale   SubCutaneous at bedtime  levothyroxine Injectable 20 MICROGram(s) IV Push at bedtime  methylPREDNISolone sodium succinate Injectable 20 milliGRAM(s) IV Push every 6 hours  metoprolol tartrate 50 milliGRAM(s) Oral two times a day  montelukast 10 milliGRAM(s) Oral daily  pantoprazole  Injectable 40 milliGRAM(s) IV Push every 24 hours  polyethylene glycol 3350 17 Gram(s) Oral every 12 hours  tamsulosin 0.4 milliGRAM(s) Oral at bedtime    MEDICATIONS  (PRN):  acetaminophen     Tablet .. 650 milliGRAM(s) Oral every 6 hours PRN Mild Pain (1 - 3), Moderate Pain (4 - 6)  dextrose Oral Gel 15 Gram(s) Oral once PRN Blood Glucose LESS THAN 70 milliGRAM(s)/deciliter      Care Discussed with Consultants/Other Providers [x] YES  [ ] NO    Vital Signs Last 24 Hrs  T(C): 36.7 (01 Nov 2022 17:50), Max: 36.7 (01 Nov 2022 17:50)  T(F): 98.1 (01 Nov 2022 17:50), Max: 98.1 (01 Nov 2022 17:50)  HR: 98 (01 Nov 2022 17:50) (71 - 100)  BP: 148/60 (01 Nov 2022 17:50) (119/58 - 155/58)  BP(mean): --  RR: 20 (01 Nov 2022 17:50) (17 - 25)  SpO2: 100% (01 Nov 2022 17:50) (82% - 100%)    Parameters below as of 01 Nov 2022 19:40  Patient On (Oxygen Delivery Method): nasal cannula, high flow      I&O's Summary    31 Oct 2022 07:01  -  01 Nov 2022 07:00  --------------------------------------------------------  IN: 1300 mL / OUT: 800 mL / NET: 500 mL    01 Nov 2022 07:01  -  01 Nov 2022 20:04  --------------------------------------------------------  IN: 1000 mL / OUT: 2225 mL / NET: -1225 mL        PHYSICAL EXAM:  GENERAL: NAD, well-developed, obese man, comfortable on nasal canula, with occasional accessory muscle use  HEAD:  Atraumatic, Normocephalic  EYES: EOMI, PERRLA, conjunctiva and sclera clear  NECK: Supple, No JVD  CHEST/LUNG: mild decrease breath sounds bilaterally; No wheeze   HEART: Irregular rate and rhythm; No murmurs, rubs, or gallops  ABDOMEN: Soft, Nontender, Nondistended; Bowel sounds present  : chronic Ad[place] in place, britney color urine, neg CVAT   Neuro: AAOx2-3, no focal weakness, mild left UE weakness baseline   EXTREMITIES:  + Peripheral Pulses, No clubbing, cyanosis, + edema, right AKA, left foot dressing d/c/i, + arm edema  SKIN: No rashes or lesions

## 2022-11-01 NOTE — CHART NOTE - NSCHARTNOTEFT_GEN_A_CORE
[facility] NIGHT MEDICINE COVERAGE    Spoke with patient, his daughter Willow (designated Healthcare Proxy), and spouse at bedside, other daughter, Nola, present on video chat on cell phone.  Pt is awake, alert, speaks in short sentences.  Goals of care discussed with pt and family.  Pt knows why he is in the hospital, able to explain he is here because of his lungs and his leg and that he is requiring high amounts of oxygen for his respiratory status.    Pt saying he does not want a breathing tube placed or to be on a machine, but saying "I want to live, for her" referring to his wife.  Explained to pt that if one does not wish to have breathing tube placed, then chest compressions cannot be performed, they go together.  Explained that DNI status requires DNR status.  Pt and family to discuss goals of care together before reaching decision regarding MOLST form.    Attending, Dr. Heller, made aware, will make day provider aware in AM.  Pt stable at this time, will continue to monitor.    Martir Torres PA-C  Department of Medicine - ACP  In-House Pager: #[phone]

## 2022-11-01 NOTE — PROGRESS NOTE ADULT - PROBLEM SELECTOR PLAN 1
events noted: seen by thoracic surgery  : was supposed to get pleurl effusion drained today  : but labs needed to be optimized:  now due for tomorrow: he recently had thoracenteses  done on both sides at [facility]:  has poor LV function:  currently on diuresis    10/27: diuresis being done : no chest tube done as he is not optimized for the OR yet:  appreciate thoracic consult:  cont diuresis for now:  chest xray today seems  with better oxygenation    10/28: he seems to doing  ok ; no sob:  has large effusion on the  left side and he was recently tapped at [facility]:  has severe LV dysfunction:: pl for drainage on Monday : dw acp thoracic    10/29: still has large effusion on left side:  awaiting procedure on left side    10/30: seems OK : stephen any sob:  but he looks sob to me:  awaiting pl fluid drainage in am    10/31: drained about 1500 cc of fluid:    11/1: he has lot of phlegm in his chest which he isnot able to cough  out: start chest vest; chest apt AGGRESSIVE AND USE BiPAP FOR WOB:  IF HE CANT USE BIP : USE HIGH FLOW AND INCENTIVE SPIROMETRY: HIS CHEST X-RAY WITH ATELECTASIS  : NO PTX : HE HEEDS AGGRESSIVE CHEST TOILET OTHER ISE THERE IS A RISK OF HIM GETTING INTUBATED:

## 2022-11-02 NOTE — PROGRESS NOTE ADULT - SUBJECTIVE AND OBJECTIVE BOX
CARDIOLOGY FOLLOW UP - Dr. Stephens  Date of Service: 11/2/2022  CC: events noted, remains on HFNC    Review of Systems:  Constitutional: No fever, weight loss, or fatigue  Respiratory: No cough, wheezing, or hemoptysis, no shortness of breath  Cardiovascular: No chest pain, palpitations, passing out, dizziness, or leg swelling  Gastrointestinal: No abd or epigastric pain. No nausea, vomiting, or hematemesis; no diarrhea or consiptaiton, no melena or hematochezia  Vascular: No edema     TELEMETRY:    PHYSICAL EXAM:  T(C): 36.4 (11-02-22 @ 09:25), Max: 36.9 (11-02-22 @ 05:26)  HR: 88 (11-02-22 @ 10:00) (75 - 98)  BP: 101/82 (11-02-22 @ 09:25) (101/82 - 150/64)  RR: 19 (11-02-22 @ 10:00) (18 - 25)  SpO2: 100% (11-02-22 @ 10:00) (82% - 100%)  Wt(kg): --  I&O's Summary    01 Nov 2022 07:01  -  02 Nov 2022 07:00  --------------------------------------------------------  IN: 1000 mL / OUT: 3125 mL / NET: -2125 mL        Appearance: Normal	  Cardiovascular: Normal S1 S2,RRR, No JVD, No murmurs  Respiratory: Lungs clear to auscultation	  Gastrointestinal:  Soft, Non-tender, + BS	  Extremities: Normal range of motion, No clubbing, cyanosis or edema  Vascular: Peripheral pulses palpable 2+ bilaterally       Home Medications:  acetaminophen 325 mg oral tablet: 2 tab(s) orally every 6 hours, As needed, Moderate Pain (4 - 6) (23 Sep 2022 16:43)  acetylcysteine 20% inhalation solution: 4 milliliter(s) inhaled 4 times a day as needed (25 Oct 2022 10:37)  acetylcysteine 20% inhalation solution: 4 milliliter(s) inhaled 3 times a day (23 Sep 2022 16:43)  aspirin 81 mg oral delayed release tablet: 1 tab(s) orally once a day (23 Sep 2022 16:43)  Aspirin Enteric Coated 81 mg oral delayed release tablet: 1 tab(s) orally once a day (25 Oct 2022 10:37)  atorvastatin 80 mg oral tablet: 1 tab(s) orally once a day (at bedtime) (23 Sep 2022 16:43)  bacitracin 500 units/g topical ointment: Apply topically to affected area twice to back (25 Oct 2022 10:37)  budesonide 0.5 mg/2 mL inhalation suspension: 0.5 milligram(s) inhaled 2 times a day (14 Sep 2022 19:13)  budesonide 0.5 mg/2 mL inhalation suspension: 2 milliliter(s) inhaled 2 times a day (25 Oct 2022 10:37)  cadexomer iodine 0.9% topical gel: 1 application topically once a day (23 Sep 2022 16:43)  Coumadin 3 mg oral tablet: 1 tab(s) orally once a day (at bedtime) (23 Sep 2022 16:43)  Coumadin 4 mg oral tablet: 1 tab(s) orally once a day (25 Oct 2022 10:37)  finasteride 5 mg oral tablet: 1 tab(s) orally once a day (14 Sep 2022 19:13)  finasteride 5 mg oral tablet: 1 tab(s) orally once a day (25 Oct 2022 10:37)  Flomax 0.4 mg oral capsule: 1 cap(s) orally once a day (25 Oct 2022 10:37)  gabapentin 100 mg oral capsule: 1 tab(s) orally 2 times a day (25 Oct 2022 10:37)  gabapentin 100 mg oral tablet: 1 tab(s) orally 2 times a day (14 Sep 2022 19:13)  guaifenesin-dextromethorphan 100 mg-10 mg/5 mL oral liquid: 10 milliliter(s) orally every 6 hours (23 Sep 2022 16:43)  insulin glargine 100 units/mL subcutaneous solution: 10 unit(s) subcutaneous once a day (at bedtime) (23 Sep 2022 16:43)  insulin lispro 100 units/mL injectable solution: 14 unit(s) injectable once a day before dinner (23 Sep 2022 18:00)  insulin lispro 100 units/mL injectable solution: 14 unit(s) injectable once a day before lunch (23 Sep 2022 18:00)  insulin lispro 100 units/mL injectable solution: 20 unit(s) injectable once a day before Breakfast (23 Sep 2022 18:00)  insulin lispro 100 units/mL subcutaneous solution: 14 unit(s) subcutaneous before lunch and dinner (25 Oct 2022 10:37)  Iodosorb 0.9% topical gel: to heel (25 Oct 2022 10:37)  ipratropium: inhalation as needed (25 Oct 2022 10:37)  ipratropium-albuterol 0.5 mg-2.5 mg/3 mL inhalation solution: 3 milliliter(s) inhaled every 6 hours (23 Sep 2022 16:43)  Lantus 100 units/mL subcutaneous solution: 10 unit(s) subcutaneous once a day (at bedtime) (25 Oct 2022 10:37)  Lasix 20 mg oral tablet: 1 tab(s) orally once a day (25 Oct 2022 10:37)  Lipitor 80 mg oral tablet: 1 tab(s) orally once a day (25 Oct 2022 10:37)  medihoney:  (25 Oct 2022 10:37)  metoprolol tartrate 50 mg oral tablet: 1 tab(s) orally 2 times a day (23 Sep 2022 16:43)  Metoprolol Tartrate 50 mg oral tablet: 1 tab(s) orally 2 times a day (25 Oct 2022 10:37)  montelukast 10 mg oral tablet: 1 tab(s) orally once a day (at bedtime) (23 Sep 2022 16:43)  Multiple Vitamins with Minerals oral tablet: 1 tab(s) orally once a day (23 Sep 2022 16:43)  pantoprazole 40 mg oral delayed release tablet: 1 tab(s) orally once a day (before a meal) (23 Sep 2022 16:43)  polyethylene glycol 3350 oral powder for reconstitution: 17 gram(s) orally once a day (23 Sep 2022 16:43)  predniSONE 50 mg oral tablet: 1 tab(s) orally once a day for one more dose on 9/24/22 then D/C (23 Sep 2022 16:43)  Protonix 40 mg oral delayed release tablet: 1 tab(s) orally once a day (25 Oct 2022 10:37)  senna leaf extract oral tablet: 2 tab(s) orally once a day (at bedtime) (23 Sep 2022 16:43)  Singulair 10 mg oral tablet: 1 tab(s) orally once a day (25 Oct 2022 10:37)  sodium chloride 3% inhalation solution: 4 milliliter(s) inhaled every 12 hours (23 Sep 2022 16:43)  Synthroid 25 mcg (0.025 mg) oral tablet: 1 tab(s) orally once a day (25 Oct 2022 10:37)  Synthroid 25 mcg (0.025 mg) oral tablet: 1 tab(s) orally once a day (14 Sep 2022 19:13)  tamsulosin 0.4 mg oral capsule: 2 cap(s) orally once a day (at bedtime) (14 Sep 2022 19:13)  zinc oxide topical cream: Apply topically to affected area twice to AKA (25 Oct 2022 10:37)        MEDICATIONS  (STANDING):  albuterol/ipratropium for Nebulization 3 milliLiter(s) Nebulizer every 6 hours  aspirin enteric coated 81 milliGRAM(s) Oral daily  atorvastatin 80 milliGRAM(s) Oral at bedtime  BACItracin   Ointment 1 Application(s) Topical daily  buDESOnide    Inhalation Suspension 0.5 milliGRAM(s) Inhalation every 12 hours  collagenase Ointment 1 Application(s) Topical daily  dextrose 5%. 1000 milliLiter(s) (50 mL/Hr) IV Continuous <Continuous>  dextrose 5%. 1000 milliLiter(s) (100 mL/Hr) IV Continuous <Continuous>  dextrose 50% Injectable 25 Gram(s) IV Push once  dextrose 50% Injectable 25 Gram(s) IV Push once  dextrose 50% Injectable 12.5 Gram(s) IV Push once  dextrose 50% Injectable 25 Gram(s) IV Push once  enoxaparin Injectable 90 milliGRAM(s) SubCutaneous every 12 hours  finasteride 5 milliGRAM(s) Oral daily  furosemide   Injectable 40 milliGRAM(s) IV Push two times a day  glucagon  Injectable 1 milliGRAM(s) IntraMuscular once  insulin glargine Injectable (LANTUS) 7 Unit(s) SubCutaneous at bedtime  insulin lispro (ADMELOG) corrective regimen sliding scale   SubCutaneous three times a day before meals  insulin lispro (ADMELOG) corrective regimen sliding scale   SubCutaneous at bedtime  levothyroxine Injectable 20 MICROGram(s) IV Push at bedtime  methylPREDNISolone sodium succinate Injectable 20 milliGRAM(s) IV Push every 6 hours  metoprolol tartrate 50 milliGRAM(s) Oral two times a day  montelukast 10 milliGRAM(s) Oral daily  pantoprazole  Injectable 40 milliGRAM(s) IV Push every 24 hours  polyethylene glycol 3350 17 Gram(s) Oral every 12 hours  tamsulosin 0.4 milliGRAM(s) Oral at bedtime        EKG:  RADIOLOGY:  DIAGNOSTIC TESTING:  [ ] Echocardiogram:  [ ] Catherterization:  [ ] Stress Test:  OTHER:     LABS:	 	                          9.4    4.15  )-----------( 123      ( 02 Nov 2022 05:30 )             33.5     11-02    143  |  103  |  15  ----------------------------<  215<H>  4.1   |  30  |  0.82    Ca    8.3<L>      02 Nov 2022 07:02  Phos  3.5     11-02  Mg     1.60     11-02    TPro  5.6<L>  /  Alb  x   /  TBili  x   /  DBili  x   /  AST  x   /  ALT  x   /  AlkPhos  x   11-01          CARDIAC MARKERS:

## 2022-11-02 NOTE — PROGRESS NOTE ADULT - ASSESSMENT
TTE with Doppler (w/Cont) (04.03.22 @ 15:07) >  mild aortic stenosis. . Mild left ventricular systolic dysfunction. The inferior wall, and the inferoseptum are hypokinetic.  Echo 9/15/22: .EF 35-40% Severe segmental left ventricular systolic dysfunction. The mid to distal anteroseptum and severely hypokinetic. The mid to basal inferolateral wall is hypokinetic.      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 The mid to distal anteroseptum and severely hypokinetic. The mid to basal inferolateral wall is hypokinetic.  -thoracic surgery f/u    # acute on chronic systolic CHF   -overloaded  -IV lasix as above   -echo with moderate severe lv dysfxn, unchanged from echo  4/2022  -continue medical management of CMP/HF  -ecg, no ischemic changes, HS trop elevated secondary to hypoxia/ chf/ resp infection   -c/w bb     #Pafib, sp PPM   -c/w BB  -off oral ac  -cont AC    #mild as   -echo stable     #Abnl UA  -abx per med      #hypernatremia   -renal f/u    35 minutes spent on total encounter; more than 50% of the visit was spent counseling and/or coordinating care by the attending physician.

## 2022-11-02 NOTE — PROGRESS NOTE ADULT - PROBLEM SELECTOR PLAN 1
events noted: seen by thoracic surgery  : was supposed to get pleurl effusion drained today  : but labs needed to be optimized:  now due for tomorrow: he recently had thoracenteses  done on both sides at [facility]:  has poor LV function:  currently on diuresis    10/27: diuresis being done : no chest tube done as he is not optimized for the OR yet:  appreciate thoracic consult:  cont diuresis for now:  chest xray today seems  with better oxygenation    10/28: he seems to doing  ok ; no sob:  has large effusion on the  left side and he was recently tapped at [facility]:  has severe LV dysfunction:: pl for drainage on Monday : dw acp thoracic    10/29: still has large effusion on left side:  awaiting procedure on left side    10/30: seems OK : stephen any sob:  but he looks sob to me:  awaiting pl fluid drainage in am    10/31: drained about 1500 cc of fluid:    11/1: he has lot of phlegm in his chest which he isnot able to cough  out: start chest vest; chest apt AGGRESSIVE AND USE BiPAP FOR WOB:  IF HE CANT USE BIP : USE HIGH FLOW AND INCENTIVE SPIROMETRY: HIS CHEST X-RAY WITH ATELECTASIS  : NO PTX : HE HEEDS AGGRESSIVE CHEST TOILET OTHER ISE THERE IS A RISK OF HIM GETTING INTUBATED:    11/2: little better then yesterday : still on chest vest: his ABG from yesterday with reasonable ph:  and 59 pco2: he has pretty bad ef:  and his chest xray is suggestive of chf: cont iv steroids today too with BD and decrease to 20 mg q 12 hours from am  : use bipap prn as needed and full time at night time:

## 2022-11-02 NOTE — PROGRESS NOTE ADULT - ASSESSMENT
87 year old M hx of severe systolic CHF, b/l pleural effusion s/p thoracentesis in the past, SSS w/ pacemaker w/ f/u w/ EP, CVA w/ residual left sided UE weakness, chronic hudson, BPH, COPD/asthma, restrictive lung dx 2/2 obesity, right AKA, hypothyroid, afib on coumadin who presents from [facility] w/ lethargy and sob.

## 2022-11-02 NOTE — SWALLOW FEES ASSESSMENT ADULT - DIAGNOSTIC IMPRESSIONS
CHIEF COMPLAINT    Chief Complaint   Patient presents with   • Knee Pain       HPI    52-year-old male with past medical history of HDL, hyperglycemia, and GERD presents for right knee infection.     Pt had a right knee arthroscopy with meniscectomy with versus meniscus repair done by Dr. De on 12/31/2021.  Past 2 days he has developed increase chills body aches and a warm swollen right knee.  Was seen by Dr. De in the office today in which a knee tap was performed.  Positive for infection.  Patient denies any numbness tingling or paresthesias distally.  Patient's pain is a 3/10 sitting while standing or movement is a 10/10.  No calf pain or unilateral leg swelling no chest pain or shortness of breath. No right hip pain.   Feels fevers but no documented fever.    Dr. De called and will take to OR tonight. Pt had a right knee tap performed in office today that was positive for infection.     Allergies    ALLERGIES:   Allergen Reactions   • Amoxicillin RASH     Potential reaction of a rash with this medication (was taken with 2 other medications so cannot confirm)    • Clarithromycin RASH     Potential reaction of a rash with this medication (was taken with 2 other medications so cannot confirm)    • Codeine RASH   • Omeprazole RASH     Potential reaction of a rash to this medication       Current Medications   Current Facility-Administered Medications   Medication Dose Route Frequency Provider Last Rate Last Admin   • sodium chloride (NORMAL SALINE) 0.9 % bolus 1,000 mL  1,000 mL Intravenous Once Chen Tidwell PA-C 999 mL/hr at 01/09/22 1933 1,000 mL at 01/09/22 1933   • [MAR Hold] ceFAZolin (ANCEF) syringe 2,000 mg  2,000 mg Intravenous Once Chen Tidwell PA-C       • [MAR Hold] vancomycin 2,000 mg in sodium chloride 0.9% 500 mL IVPB  2,000 mg Intravenous Once Chen Tidwell PA-C           Past Medical History    Past Medical History:   Diagnosis Date   • Failed moderate sedation during procedure  Pt called and is requesting his lab orders to be mailed to his home address.    Thanks    01/27/2020    maximum amount of IV sedation given during colonoscopy/EGD; patient awake throughout procedures   • Melanoma (CMS/HCC) 2016       Surgical History    Past Surgical History:   Procedure Laterality Date   • Colonoscopy w/ polypectomy  01/27/2020    repeat colonoscopy in 5 years for hx rectal polyp due Jan 2025   • Esophagogastroduodenoscopy  01/27/2020    positive for H pylori and treated   • [place] tooth extraction Bilateral        Social History    Social History     Tobacco Use   • Smoking status: Never Smoker   • Smokeless tobacco: Never Used   Substance Use Topics   • Alcohol use: Yes     Alcohol/week: 0.0 standard drinks     Comment: rarely    • Drug use: Never       Family History    Family History   Problem Relation Age of Onset   • Diabetes Mother    • Heart disease Father    • Heart disease Maternal Grandfather        REVIEW OF SYSTEMS    ALL 13 SYSTEMS REVIEWED AND NEGATIVE OR NONCONTRIBUTORY UNLESS OTHERWISE NOTED IN HPI      PHYSICAL EXAM     ED Triage Vitals [01/09/22 1907]   ED Triage Vitals Group      Temp 97.9 °F (36.6 °C)      Heart Rate 80      Resp (!) 24      /49      SpO2 98 %      EtCO2 mmHg       Height       Weight 220 lb (99.8 kg)      Weight Scale Used       BMI (Calculated)       IBW/kg (Calculated)        Gen:   AAOx3 in no acute distress but appears uncomfortable and arrives in wheelchair.   Head:  Normocephalic, without abnormal findings  Neck: Normal ROM and supple.   Resp: Clear to ascultation bilaterally without wheezes, rhonchi, or rales. No sign of respiratory distress.   CVS: Regular rate and rhythm without significant murmur, rub or gallop  ABD: Normoactive BS, Nontender, No masses or organomegaly  Back: No CVA tenderness, deformities, swelling or ecchymosis  Ext:  Patient has significant ecchymosis to the right knee medial and laterally secondary to surgery.  No posterior knee pain or swelling.  Pre patella and inferior patella is warm and swollen to the  touch.  No significant cellulitis.  Calf is soft.  Negative Homans signs.  3+ dorsalis pedis pulse distally.  Normal sensation to each toes good cap refill.  Skin:  Well perfused, pt clammy and diaphoretic.   Neuro: No focal Neuro deficits with equal UE/LE strength and sensation.  Psych:Alert and interactive with normal affect and interaction.      Procedures      MDM    Knee Pain  Knee sprain, knee strain, traumatic effusion, patellar fracture, ligamentous injury, cartilaginous injury, septic joint, dislocation, patellar dislocation, gout, fibular/tibial/femur fracture    Patient's knee tap review found to have large amount of white blood cells and neutrophils.  Patient did have a slight leukocytosis.  Soft blood pressure fluids were provided.  Patient was given Ancef and vanco while in the department.  Patient was taken directly to the OR by Dr. De for a washout    Labs  Results for orders placed or performed during the hospital encounter of 01/09/22   Basic Metabolic Panel   Result Value    Fasting Status     Sodium 140    Potassium 4.1    Chloride 103    Carbon Dioxide 26    Anion Gap 15    Glucose 122 (H)    BUN 22 (H)    Creatinine 0.97    Glomerular Filtration Rate 89     Comment: eGFR results = or >60 mL/min/1.73m2 = Normal kidney function. Estimated GFR calculated using the 2009 CKD-EPI creatinine equation.      BUN/ Creatinine Ratio 23    Calcium 9.5   C Reactive Protein   Result Value    C-Reactive Protein 0.6   CBC with Automated Differential (performable only)   Result Value    WBC 14.6 (H)    RBC 5.31    HGB 15.8    HCT 45.9    MCV 86.4    MCH 29.8    MCHC 34.4    RDW-CV 12.5    RDW-SD 39.5        NRBC 0    Neutrophil, Percent 84    Lymphocytes, Percent 9    Mono, Percent 5    Eosinophils, Percent 1    Basophils, Percent 0    Immature Granulocytes 1    Absolute Neutrophils 12.3 (H)    Absolute Lymphocytes 1.3    Absolute Monocytes 0.7    Absolute Eosinophils  0.2    Absolute Basophils 0.1     Absolute Immmature Granulocytes 0.1   Rapid SARS-CoV-2 by PCR    Specimen: Nasal, Mid-turbinate; Swab   Result Value    Rapid SARS-COV-2 by PCR Not Detected    Isolation Guidelines      Comment: Do not use this test result as the sole decision-maker for discontinuation of isolation.   Clinical evaluation should be considered for other respiratory illness requiring transmission-based isolation.    -    No fever (<99.0 F/37.2 C) for at least 24 hours without the use of fever-reducing medications    AND  -    Respiratory symptoms have improved or resolved (e.g. cough, shortness of breath)     AND  -    COVID-19 negative test    See COVID-19 Deisolation Resource Guide    Procedural Comment      Comment: SARS-CoV-2 nucleic acid has not been detected indicating the absence of COVID-19.       Testing was performed using the E[employer] Xpert Xpress SARS-CoV-2 RT-PCR assay that has been given Emergency Use Authorization (EUA) by the [place] Food and Drug Administration (FDA).  These results are considered definitive and do not need to be confirmed by another method.             Medications   sodium chloride (NORMAL SALINE) 0.9 % bolus 1,000 mL (1,000 mLs Intravenous New Bag 1/9/22 1933)   ceFAZolin (ANCEF) syringe 2,000 mg ( Intravenous Automatically Held 1/9/22 2000)   vancomycin 2,000 mg in sodium chloride 0.9% 500 mL IVPB ( Intravenous Automatically Held 1/9/22 2000)   ondansetron (ZOFRAN) injection 4 mg (4 mg Intravenous Given 1/9/22 1934)       Rechecks   7:45 PM pt updated with leukocytosis. Pt's nausea has improved. Fluids infusing.     8:01 PM OR updated with negative covid and normal CRP      Consults  Is case discussed with Dr. De will admit patient for septic knee, would like vanco and ancef 2 grams ordered.       Diagnosis:  ED Diagnoses        Final diagnoses    Pyogenic arthritis of right knee joint, due to unspecified organism (CMS/[facility])          Acute pain of right knee                     Chen Tidwell,  TRISTIAN  01/09/22 2010     87 y/o M s/p elective BKA 4/1, postoperative course c/b severe COPD exacerbation. Scheduled for AKA completion on 4/22. Patient seen for Fiberoptic Endoscopic Evaluation of Swallow (FEES) 2/2 reports of coughing/gurgling during PO intake. Pt presents with severe amisha-pharyngeal dysphagia. Pt was trialed with ice chips, moderately thick liquids, and puree. The oral stage was marked by uncontrolled spillover of all viscosities with deep penetration of puree texture to the level of the vocal folds before the swallow, without retrieval despite cued cough. Chin tuck was unsuccessful in improving airway protection. The pharyngeal stage was marked by delayed trigger of pharyngeal swallow (up to 9 seconds at times). Pt demonstrated deep penetration during the swallow to the level of the vocal folds with moderately thick liquids and ice chip trials. Intermittent delayed wet coughing throughout study and increase in wheezing. Pt is at HIGH risk for aspiration of all PO intake.     Disorders: reduced BOT to posterior pharyngeal wall contact, delay in trigger of the swallow reflex, reduced hyo-laryngeal excursion, reduced laryngeal closure, reduced pharyngeal contractility, and reduced supraglottic/subglottic sensation.

## 2022-11-02 NOTE — CHART NOTE - NSCHARTNOTEFT_GEN_A_CORE
CXR post thoracentesis remains stable  Cultures NGTD  Cytology testing  No other thoracic surgical intervention planned at this time  Recall as needed  Care per primary team

## 2022-11-02 NOTE — ADVANCED PRACTICE NURSE CONSULT - REASON FOR CONSULT
Patient known to [facility] service line last seen on 10/25/22, Patient seen on skin care rounds after wound care referral received for assessment of skin impairment and recommendations of topical management of R AKA abscess with purulent drainage. 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     As per Goals of Care Chart Note on 11/1/22 @ 9927: Pt is awake, alert, speaks in short sentences. Pt saying he does not want a breathing tube placed or to be on a machine, but saying "I want to live, for her" referring to his wife. PA Martir Torres explained to pt that if one does not wish to have breathing tube placed, then chest compressions cannot be performed, they go together. It was also explained that DNI status requires DNR status.  Pt and family to discuss goals of care together before reaching decision regarding MOLST form.    As per provider contact note from 11/2/22 at 07:55 and 07:58: Patient with altered mental status. Patient currently A&Ox1 only able to state name. When asked if patient knows where he is, patient stated "I am at [employer] [facility] house." Patient also stated the year is 2024 and stated he does not know the month. Patient with use of accessory muscles. Patient denies shortness of breath, respirations 25 on high flow 40/40.    As per PA Event Note from 11/2/22 @1046: PA spoke with his daughter Willow (designated Healthcare proxy) on the phone to review goals of care. Willow states she wants her father to be DNR and DNI. MOLST form filled out and witnessed by RN. MOLST form placed in chart. Attending Dr. Keenan notified and made aware.    Patient has been on methylPrednisone IVP 20mg from 11/1/22.    Chart reviewed: WBC: 4.15, H&H: 9.4/33.5, Platelets: 123, INR: 1.25, A1C: 9.1, BMI: 30.0 Rolando Rodriguez. Patient known to [facility] service line last seen on 10/25/22, Patient seen on skin care rounds after wound care referral received for assessment of skin impairment and recommendations of topical management of R AKA abscess with purulent drainage. 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 w/ lethargy and SOB.     As per PA Event Note from 11/2/22 @1046: PA spoke with his daughter Willow (designated Healthcare proxy) on the phone to review goals of care. Willow states she wants her father to be DNR and DNI. MOLST form filled out and witnessed by RN. MOLST form placed in chart. Attending Dr. Keenan notified and made aware.    Patient has been on methylPrednisone IVP 20mg from 11/1/22.    VA Duplex of L Upper veins from 10/25/22: No evidence of deep vein thrombosis in the visualized left upper extremity. Superficial vein thrombosis noted within the left upper arm cephalic vein.    Chart reviewed: WBC: 4.15, H&H: 9.4/33.5, Platelets: 123, INR: 1.25, A1C: 9.1, BMI: 30.0 Rolando 14.

## 2022-11-02 NOTE — ADVANCED PRACTICE NURSE CONSULT - ASSESSMENT
General: A&Ox2, somnolent, bedbound. LUE and LLE weakness, able to turn to the side with 2 assist. Patient with high flow O2.  Adair catheter in place, incontinent of stool. On assessment, incontinence care of pasty brown stool provided. Skin warm, dry with increased moisture in intertriginous folds, sacrum to bilateral buttocks with blanchable erythema, scattered areas of hyperpigmentation and hypopigmentation, scattered areas of ecchymosis on bilateral upper extremities without hematoma. +3 weeping edema to LUE. Generalized patches of dry flaky skin to BL UE and BL LE.     Left posterior ear: Originally assessed as Stage I MDRPI, now healed as evidenced by blanchable erythema.     R medial scapula cervical spine: atypical wound of unknown etiology measuring 2.5cmx2.5cmx0.2cm presenting with 60% friable pink base, 40% white-yellow fibrinous film in center with scar tissue to center of wound. Wound edge well demarcated with hyperpigmentation and reepithelialization circumferentially, small serosanguinous drainage, no odor. Periwound with blanchable erythema <2 2cm circumferentially, no increased warmth, no edema, no induration. Goals of care: atraumatic dressing, contain moderate drainage. As per previous note, daughter reports patient has had this wound "for a long time".     Left upper posterior arm: originally seen as skin tear vs deroofed blister previously measuring 0gpn4ujo9.2cm (10/25/22) now appearing crusted over with blanchable erythema and no drainage.      Left buttock: Stage I PI complicated by candidiasis as evidenced by non-blanchable erythema and hyperpigmentation of exposed area: dark connie red hue, and erythema more intense in the center measuring 1cmx0.2abl2gh. No drainage, no odor. Periwound with blanchable erythema, no increased warmth, no edema, no induration, no signs or symptoms of overt skin infection. Goals of care: offload pressure, protect from friction and shear, monitor for tissue type changes.      R groin: Incontinence and Moisture Associated Dermatitis complicated by suspected candidiasis as evidence by pinpoint satellite lesions visible at the periphery, Hyperpigmentation of exposed area: dark connie red hue, and erythema more intense in the center.     BL groin/abdominal pannus: Moisture associated dermatitis evidenced by increased moisture, blanchable erythema and hyperpigmentation.     Vascular: R AKA incision site well approximated with intact serosanguinous bullae with fibrin. Left lower extremity with hemosiderin staining, shiny taut skin, thickened-yellow hyperpigmented toenails. Left knee with area of blanchable erythema and stable scab measuring [id number]. Increased coolness from knee to distal toes. Unable to palpate R popiteal pulses, weak, monophasic popiteal doppler sounds.    R AKA: Intact serosanguinous and fibrin vs purulent filled bullae measuring [id number] with linear purple-maroon discoloration at 9 o'clock measuring [id number] with no fluctuance, crepitus, no induration, no erythema, no edema, no temperature changes, no overt signs of infection noted. Goals of care: Antimicrobial support, prevent from friction, shearing, pressure. Monitor for tissue type changes.     Left heel and lateral foot: managed by podiatry, dressing clean, dry and intact. General: A&Ox2, somnolent, bedbound. LUE and LLE weakness, able to turn to the side with 2 assist. Patient with high flow O2.  Adair catheter in place, incontinent of stool. On assessment, incontinence care of pasty brown stool provided. Skin warm, dry with increased moisture in intertriginous folds, sacrum to bilateral buttocks with blanchable erythema, scattered areas of hyperpigmentation and hypopigmentation, scattered areas of ecchymosis on bilateral upper extremities without hematoma. +3 weeping edema to LUE. Generalized patches of dry flaky skin to BL UE and BL LE.     Left posterior ear: Previously assessed as Stage I MDRPI, now healed as evidenced by blanchable erythema.     R medial scapula cervical spine: atypical wound of unknown etiology measuring 2.5cmx2.5cmx0.2cm presenting with 60% friable pink base, 40% white-yellow fibrinous film in center with scar tissue to center of wound. Wound edge well demarcated with hyperpigmentation and reepithelialization circumferentially, small serosanguinous drainage, no odor. Periwound with blanchable erythema <2 2cm circumferentially, no increased warmth, no edema, no induration. Goals of care: atraumatic dressing, contain small drainage, monitor for tissues type changes. As per previous note, daughter reports patient has had this wound "for a long time".     Left upper posterior arm: originally seen as skin tear vs deroofed blister previously measuring 8ivd2snd5.2cm (10/25/22) now appearing as a healed skin tear as evidenced by stable scab with blanchable erythema <2cm and no drainage. No induration, no erythema, no edema, no temperature changes, no overt signs of infection noted.      Left buttock: Stage I PI complicated by candidiasis as evidenced by area of non-blanchable erythema measuring 1cmx0.0qdo9si and hyperpigmentation of exposed area: dark connie red hue, and erythema more intense in the center. No drainage, no odor. Periwound with blanchable erythema, no increased warmth, no edema, no induration, no signs or symptoms of overt skin infection. Goals of care: offload pressure, protect from friction and shear, monitor for tissue type changes.      R groin: Incontinence and Moisture Associated Dermatitis complicated by suspected candidiasis as evidence by pinpoint satellite lesions visible at the periphery, Hyperpigmentation of exposed area: dark connie red hue, and erythema more intense in the center.     BL groin/abdominal pannus: Moisture associated dermatitis evidenced by increased moisture, blanchable erythema and hyperpigmentation.     Vascular: Left LE to foot with intact, dry dressing managed by podiatry for left foot wounds. R BKA chronic incision site (from 4/22/22) well approximated. Able to visualize left lower extremity with hemosiderin staining, shiny taut skin, thickened-yellow hyperpigmented toenails. Increased coolness from L knee to distal toes. Unable to palpate R popiteal pulses, weak, monophasic popiteal doppler sounds.    R BKA: Intact serosanguinous bullae with fibrin vs purulent filled drainage at the base and no active drainage measuring 1.2wwn1lkv9jv with linear purple-maroon discoloration at 9 o'clock measuring [id number] with no fluctuance, crepitus, no induration, no erythema, no edema, no temperature changes, no overt signs of infection noted and no tissue type changes palpated over purple-maroon discoloration. Goals of care: Antimicrobial support, prevent from friction, shearing, pressure. Monitor for tissue type changes.     Left heel and lateral foot: managed by podiatry, dressing clean, dry and intact.

## 2022-11-02 NOTE — PROGRESS NOTE ADULT - SUBJECTIVE AND OBJECTIVE BOX
Date of Service: 11-02-22 @ 12:44    Patient is a 87y old  Male who presents with a chief complaint of Acute hypoxic respiratory failure (02 Nov 2022 11:46)      Any change in ROS: pt is doing very poorly:  now dnr and dni :  on chest vest:   not in resp distress: today :     MEDICATIONS  (STANDING):  albuterol/ipratropium for Nebulization 3 milliLiter(s) Nebulizer every 6 hours  aspirin enteric coated 81 milliGRAM(s) Oral daily  atorvastatin 80 milliGRAM(s) Oral at bedtime  BACItracin   Ointment 1 Application(s) Topical daily  buDESOnide    Inhalation Suspension 0.5 milliGRAM(s) Inhalation every 12 hours  collagenase Ointment 1 Application(s) Topical daily  dextrose 5%. 1000 milliLiter(s) (50 mL/Hr) IV Continuous <Continuous>  dextrose 5%. 1000 milliLiter(s) (100 mL/Hr) IV Continuous <Continuous>  dextrose 50% Injectable 25 Gram(s) IV Push once  dextrose 50% Injectable 25 Gram(s) IV Push once  dextrose 50% Injectable 12.5 Gram(s) IV Push once  dextrose 50% Injectable 25 Gram(s) IV Push once  enoxaparin Injectable 90 milliGRAM(s) SubCutaneous every 12 hours  finasteride 5 milliGRAM(s) Oral daily  furosemide   Injectable 80 milliGRAM(s) IV Push two times a day  glucagon  Injectable 1 milliGRAM(s) IntraMuscular once  insulin glargine Injectable (LANTUS) 7 Unit(s) SubCutaneous at bedtime  insulin lispro (ADMELOG) corrective regimen sliding scale   SubCutaneous three times a day before meals  insulin lispro (ADMELOG) corrective regimen sliding scale   SubCutaneous at bedtime  levothyroxine Injectable 20 MICROGram(s) IV Push at bedtime  methylPREDNISolone sodium succinate Injectable 20 milliGRAM(s) IV Push every 6 hours  metoprolol tartrate 50 milliGRAM(s) Oral two times a day  montelukast 10 milliGRAM(s) Oral daily  pantoprazole  Injectable 40 milliGRAM(s) IV Push every 24 hours  polyethylene glycol 3350 17 Gram(s) Oral every 12 hours  tamsulosin 0.4 milliGRAM(s) Oral at bedtime    MEDICATIONS  (PRN):  acetaminophen     Tablet .. 650 milliGRAM(s) Oral every 6 hours PRN Mild Pain (1 - 3), Moderate Pain (4 - 6)  dextrose Oral Gel 15 Gram(s) Oral once PRN Blood Glucose LESS THAN 70 milliGRAM(s)/deciliter    Vital Signs Last 24 Hrs  T(C): 36.4 (02 Nov 2022 09:25), Max: 36.9 (02 Nov 2022 05:26)  T(F): 97.5 (02 Nov 2022 09:25), Max: 98.4 (02 Nov 2022 05:26)  HR: 88 (02 Nov 2022 10:00) (75 - 98)  BP: 101/82 (02 Nov 2022 09:25) (101/82 - 150/64)  BP(mean): --  RR: 19 (02 Nov 2022 10:00) (18 - 25)  SpO2: 100% (02 Nov 2022 10:00) (95% - 100%)    Parameters below as of 02 Nov 2022 10:00  Patient On (Oxygen Delivery Method): nasal cannula, high flow  O2 Flow (L/min): 40  O2 Concentration (%): 40    I&O's Summary    01 Nov 2022 07:01  -  02 Nov 2022 07:00  --------------------------------------------------------  IN: 1000 mL / OUT: 3125 mL / NET: -2125 mL    02 Nov 2022 07:01  -  02 Nov 2022 12:44  --------------------------------------------------------  IN: 500 mL / OUT: 950 mL / NET: -450 mL          Physical Exam:   GENERAL: NAD, well-groomed, well-developed  HEENT: PETRONA/   Atraumatic, Normocephalic  ENMT: No tonsillar erythema, exudates, or enlargement; Moist mucous membranes, Good dentition, No lesions  NECK: Supple, No JVD, Normal thyroid  CHEST/LUNG: coarse rhodochrous sounds:   CVS: Regular rate and rhythm; No murmurs, rubs, or gallops  GI: : Soft, Nontender, Nondistended; Bowel sounds present  NERVOUS SYSTEM:  opne eys and tries to responds  EXTREMITIES: +edema  LYMPH: No lymphadenopathy noted  SKIN: No rashes or lesions  ENDOCRINOLOGY: No Thyromegaly  PSYCH: calm +    Labs:  34                            9.4    4.15  )-----------( 123      ( 02 Nov 2022 05:30 )             33.5                         8.5    8.18  )-----------( 125      ( 01 Nov 2022 07:34 )             29.5                         8.5    7.70  )-----------( 115      ( 31 Oct 2022 07:12 )             31.7                         7.9    6.03  )-----------( 115      ( 31 Oct 2022 05:40 )             27.5                         9.0    7.44  )-----------( 137      ( 30 Oct 2022 04:55 )             31.4     11-02    143  |  103  |  15  ----------------------------<  215<H>  4.1   |  30  |  0.82  11-01    142  |  102  |  14  ----------------------------<  143<H>  3.3<L>   |  32<H>  |  0.76  10-31    147<H>  |  102  |  23  ----------------------------<  75  4.6   |  27  |  0.91  10-31    145  |  105  |  18  ----------------------------<  164<H>  3.3<L>   |  32<H>  |  0.88  10-30    147<H>  |  106  |  20  ----------------------------<  211<H>  3.5   |  39<H>  |  0.90  10-30    146<H>  |  105  |  22  ----------------------------<  150<H>  3.3<L>   |  35<H>  |  0.89    Ca    8.3<L>      02 Nov 2022 07:02  Ca    7.9<L>      01 Nov 2022 07:34  Phos  3.5     11-02  Phos  2.0     11-01  Mg     1.60     11-02  Mg     1.60     11-01    TPro  5.6<L>  /  Alb  x   /  TBili  x   /  DBili  x   /  AST  x   /  ALT  x   /  AlkPhos  x   11-01    CAPILLARY BLOOD GLUCOSE      POCT Blood Glucose.: 274 mg/dL (02 Nov 2022 12:19)  POCT Blood Glucose.: 229 mg/dL (02 Nov 2022 08:42)  POCT Blood Glucose.: 273 mg/dL (01 Nov 2022 21:27)  POCT Blood Glucose.: 214 mg/dL (01 Nov 2022 18:58)  POCT Blood Glucose.: 165 mg/dL (01 Nov 2022 17:36)      LIVER FUNCTIONS - ( 01 Nov 2022 15:22 )  Alb: x     / Pro: 5.6 g/dL / ALK PHOS: x     / ALT: x     / AST: x     / GGT: x               Procalcitonin, Serum: 0.20 ng/mL (11-01 @ 15:22)  Fluid Source --  Albumin, Fluid1.2 g/dL  Glucose, [id number] mg/dL  Protein total, Fluid2.5 g/dL  Lacatate Dehydrogenase, [id number] U/L  pH, Fluid8.4  Cytopathology-Non Gyn Report--        RECENT CULTURES:  10-31 @ 12:48 Pleural Fl Pleural Fluid       polymorphonuclear leukocytes seen  No organisms seen  by cytocentrifuge       rad< from: Xray Chest 1 View- PORTABLE-Routine (Xray Chest 1 View- PORTABLE-Routine in AM.) (11.01.22 @ 07:26) >    HISTORY: Postthoracentesis    COMPARISON STUDY: 10/31/2022    Frontal expiratory view of the chest shows the heart to be similarly   enlarged in size. Left cardiac pacemaker is again noted.    The lungs show similar pulmonary congestion with smaller left effusion   and there is no evidence of pneumothorax nor right pleural effusion.    IMPRESSION:  No pneumothorax.        Thank you for the courtesy of this referral.    --- End of Report ---            HORACIO HELMS MD; Attending Interventional Radiologist  This document has been electronically signed. Nov 1 2022  1:06PM    < end of copied text >  < from: Xray Chest 1 View- PORTABLE-Routine (Xray Chest 1 View- PORTABLE-Routine in AM.) (11.01.22 @ 07:26) >    HISTORY: Postthoracentesis    COMPARISON STUDY: 10/31/2022    Frontal expiratory view of the chest shows the heart to be similarly   enlarged in size. Left cardiac pacemaker is again noted.    The lungs show similar pulmonary congestion with smaller left effusion   and there is no evidence of pneumothorax nor right pleural effusion.    IMPRESSION:  No pneumothorax.        Thank you for the courtesy of this referral.    --- End of Report ---            HORACIO HELMS MD; Attending Interventional Radiologist  This document has been electronically signed. Nov 1 2022  1:06PM    < end of copied text >  < from: TTE with Doppler (w/Cont) (10.25.22 @ 15:45) >  cc/m2.  Left Ventricle: Severe global left ventricular systolic  dysfunction.  Endocardial visualization enhanced with  intravenous injection of echo contrast (Definity). Normal  left ventricular internal dimensions and wall thicknesses.  Right Heart: Normal right atrium. The right ventricle is  not well visualized; grossly normal right ventricular  systolic function.  A device wire is noted in the right  heart. Normal tricuspid valve. Minimal tricuspid  regurgitation. Normal pulmonic valve.  Pericardium/PleuraNormal pericardium with no pericardial  effusion.  ------------------------------------------------------------------------  CONCLUSIONS:  1. Mitral annular calcification, otherwise normal mitral  valve. Minimal mitral regurgitation.  2. Normal left ventricular internal dimensions and wall  thicknesses.  3. Severe global left ventricular systolic dysfunction.  Endocardial visualization enhanced with intravenous  injection of echo contrast (Definity).  4. The right ventricle is not well visualized; grossly  normal right ventricular systolic function.  A device wire  is noted in the right heart.  *** No previous Echo exam.  ------------------------------------------------------------------------  Confirmed on  10/25/2022 - 16:55:32 by Sarabjit Burnett M.D.,  [facility], [facility]    < end of copied text >      No growth          RESPIRATORY CULTURES:          Studies  Chest X-RAY  CT SCAN Chest   Venous Dopplers: LE:   CT Abdomen  Others

## 2022-11-02 NOTE — PROGRESS NOTE ADULT - NSPROGADDITIONALINFOA_GEN_ALL_CORE
paged acp : awaiting response:    10/28: dw acp and thoracic NP    10/29: paged acp    10/30: dw acp    10/31: dw acp    11/1 : dc acp    11/2: pt Is not doing well : haley acp

## 2022-11-02 NOTE — ADVANCED PRACTICE NURSE CONSULT - RECOMMEDATIONS
Recommend dermatology for biopsy of atypical wound to R medial scapula cervical spine    Podiatry to continue to follow for L foot/heel.     Consider endocrinology consult to help maintain FS and promote wound healing.     Consider I&D of R AKA anterior bullae     Topical Recommendations    Cervical Spine Atypical wound: Cleanse with NS, pat dry. Apply Liquid barrier film to periwound skin (allow to dry). Apply aquacel to base of wound, secure with foam with borders. Change daily and PRN if soiled.     Perineal area, BL buttocks, BL inner groin: Cleanse with skin cleanser, pat dry. Apply Wendy antifungal moisture barrier cream twice a day and PRN with incontinent episodes.     Abdominal Pannus: Cleanse with skin cleanser, pat dry. Place interdry textile sheeting, under intertriginous folds leaving 2 inches exposed at ends to wick, remove to wash & dry affected area, then replace. Individual sheeting may be used for up to 5 days unless soiled.     R AKA Abscess: Cleanse with NS, pat dry. Apply Liquid barrier film to periwound skin (allow to dry). Paint with iodine, allow to dry. Secure with abdominal pad, kerlix wrap and paper tape. Change daily and PRN if soiled. Monitor for tissue type changes.     L heel and lateral foot: Orders as per podiatry, continue to follow.     Continue low air loss bed therapy, continue heel elevation, continue to turn & reposition per protocol, soft pillow between bony prominences, continue moisture management with barrier creams & single breathable pad, continue measures to decrease friction/shear/pressure. Continue with nutritional support as per dietary/orders.     Plan of care discussed with primary RN Rick and primary ACP Katerina Russell.    Please contact Wound Care Service Line if we can be of further assistance (ext [phone]).     If consistent with goals of care, recommend dermatology for biopsy of atypical wound to R medial scapula cervical spine.    Podiatry to continue to follow for L foot/heel.     Consider endocrinology consult for elevated A1C (9.1).    Recommend vascular surgery consult to evaluate R BKA bullae with fibrinous vs purulent drainage at base, not actively draining and no associated cellulitis.     Topical Recommendations    Utilize small z-flow pillow to offload pressure of head and ears and R stump.    L arm w/ weeping edema: Cleanse with NS, pat dry. Apply Liquid barrier film to periwound skin (allow to dry). Place abdominal pad over areas of weeping edema, secure with kerlix and paper tape, change daily and PRN if soiled. Elevate L arm utilizing pillows.     Cervical Spine Atypical wound: Cleanse with NS, pat dry. Apply Liquid barrier film to periwound skin (allow to dry). Apply aquacel AG hydrofiber to base of wound, secure with foam with borders. Change daily and PRN if soiled.     Perineal area, BL buttocks, BL inner groin: Cleanse with skin cleanser, pat dry. Apply Wendy antifungal moisture barrier cream twice a day and PRN with incontinent episodes.     Abdominal Pannus: Cleanse with skin cleanser, pat dry. Place interdry textile sheeting, under intertriginous folds leaving 2 inches exposed at ends to wick, remove to wash & dry affected area, then replace. Individual sheeting may be used for up to 5 days unless soiled.     R BKA bullae: Cleanse with NS, pat dry. Apply Liquid barrier film to periwound skin (allow to dry). Paint with iodine, allow to dry. Secure with abdominal pad, kerlix wrap and paper tape. Change daily and PRN if soiled. Monitor for tissue type changes.     L heel and lateral foot: Orders as per podiatry, continue to follow.     Continue low air loss bed therapy, continue heel elevation, continue to turn & reposition per protocol, soft pillow between bony prominences, continue moisture management with barrier creams & single breathable pad, continue measures to decrease friction/shear/pressure. Continue with nutritional support as per dietary/orders.     Plan of care discussed with primary RN Rick and primary ACP Katerina Russell.    Please contact Wound Care Service Line if we can be of further assistance (ext [phone]).

## 2022-11-02 NOTE — CHART NOTE - NSCHARTNOTEFT_GEN_A_CORE
Patient assessed and examined at bedside. Patient is awake and alert. Patient is currently A&O x 1 (only knows his name). Patient couldn't state where he was or the year. Spoke with his daughter Willow (designated Healthcare proxy) on the phone to review goals of care. Willow states she wants her father to be DNR and DNI. MOLST form filled out and witnessed by RN. MOLST form placed in chart. Attending Dr. Keenan notified and made aware.

## 2022-11-02 NOTE — PROGRESS NOTE ADULT - SUBJECTIVE AND OBJECTIVE BOX
On 40% Hi-Jaylon NC  Work of breathing not as severe as yesterday  More lethargic and confused today    Vital Signs Last 24 Hrs  T(C): 36.4 (02 Nov 2022 15:14), Max: 36.9 (02 Nov 2022 05:26)  T(F): 97.6 (02 Nov 2022 15:14), Max: 98.4 (02 Nov 2022 05:26)  HR: 103 (02 Nov 2022 15:14) (81 - 103)  BP: 130/85 (02 Nov 2022 15:14) (101/82 - 148/76)  BP(mean): --  RR: 20 (02 Nov 2022 15:14) (18 - 20)  SpO2: 100% (02 Nov 2022 15:14) (96% - 100%)    I&O's Summary    11-01-22 @ 07:01  -  11-02-22 @ 07:00  --------------------------------------------------------  IN: 1000 mL / OUT: 3125 mL / NET: -2125 mL    11-02-22 @ 07:01  -  11-02-22 @ 16:37  --------------------------------------------------------  IN: 500 mL / OUT: 950 mL / NET: -450 mL        PHYSICAL EXAM:  GENERAL: lethargic  HEAD:  Atraumatic, Normocephalic  EYES: EOMI, PERRLA, conjunctiva and sclera clear  NECK: Supple, No JVD  CHEST/LUNG: mild decrease breath sounds bilaterally; No wheeze   HEART: Irregular rate and rhythm; No murmurs, rubs, or gallops  ABDOMEN: Soft, Nontender, Nondistended; Bowel sounds present  : chronic Adair in place, britney color urine, neg CVAT   Neuro: AAOx1, no focal weakness, mild left UE weakness baseline   EXTREMITIES:  + Peripheral Pulses, No clubbing, cyanosis, + edema, right AKA, left foot dressing d/c/i, + arm edema    LABS:                        9.4    4.15  )-----------( 123      ( 02 Nov 2022 05:30 )             33.5     11-02    143  |  103  |  15  ----------------------------<  215<H>  4.1   |  30  |  0.82    Ca    8.3<L>      02 Nov 2022 07:02  Phos  3.5     11-02  Mg     1.60     11-02    TPro  5.6<L>  /  Alb  x   /  TBili  x   /  DBili  x   /  AST  x   /  ALT  x   /  AlkPhos  x   11-01      CAPILLARY BLOOD GLUCOSE      POCT Blood Glucose.: 274 mg/dL (02 Nov 2022 12:19)  POCT Blood Glucose.: 229 mg/dL (02 Nov 2022 08:42)  POCT Blood Glucose.: 273 mg/dL (01 Nov 2022 21:27)  POCT Blood Glucose.: 214 mg/dL (01 Nov 2022 18:58)  POCT Blood Glucose.: 165 mg/dL (01 Nov 2022 17:36)            RADIOLOGY & ADDITIONAL TESTS:    Imaging Personally Reviewed:  [x] YES  [ ] NO    Will obtain old records:  [ ] YES  [x] NO

## 2022-11-02 NOTE — PROGRESS NOTE ADULT - ASSESSMENT
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

## 2022-11-03 NOTE — PROGRESS NOTE ADULT - ASSESSMENT
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

## 2022-11-03 NOTE — PROGRESS NOTE ADULT - SUBJECTIVE AND OBJECTIVE BOX
On 40% Hi-Jaylon NC  Work of breathing not as severe as yesterday  Pt remains lethargic and confused today    Vital Signs Last 24 Hrs  T(C): 36.4 (03 Nov 2022 09:20), Max: 36.7 (03 Nov 2022 05:20)  T(F): 97.6 (03 Nov 2022 09:20), Max: 98 (03 Nov 2022 05:20)  HR: 100 (03 Nov 2022 10:01) (73 - 103)  BP: 139/83 (03 Nov 2022 09:20) (130/85 - 148/76)  BP(mean): --  RR: 20 (03 Nov 2022 10:01) (18 - 20)  SpO2: 100% (03 Nov 2022 10:01) (97% - 100%)    I&O's Summary    11-02-22 @ 07:01  -  11-03-22 @ 07:00  --------------------------------------------------------  IN: 1000 mL / OUT: 2150 mL / NET: -1150 mL        PHYSICAL EXAM:  GENERAL: lethargic  HEAD:  Atraumatic, Normocephalic  EYES: EOMI, PERRLA, conjunctiva and sclera clear  NECK: Supple, No JVD  CHEST/LUNG: mild decrease breath sounds bilaterally; No wheeze   HEART: Irregular rate and rhythm; No murmurs, rubs, or gallops  ABDOMEN: Soft, Nontender, Nondistended; Bowel sounds present  : chronic Adair in place, britney color urine, neg CVAT   Neuro: AAOx1, no focal weakness, mild left UE weakness baseline   EXTREMITIES:  + Peripheral Pulses, No clubbing, cyanosis, + edema, right AKA, left foot dressing d/c/i, + arm edema      LABS:                        10.5   4.38  )-----------( 138      ( 03 Nov 2022 06:55 )             38.4     11-03    142  |  101  |  19  ----------------------------<  219<H>  4.9   |  30  |  0.95    Ca    8.7      03 Nov 2022 05:50  Phos  3.3     11-03  Mg     1.70     11-03    TPro  5.6<L>  /  Alb  x   /  TBili  x   /  DBili  x   /  AST  x   /  ALT  x   /  AlkPhos  x   11-01      CAPILLARY BLOOD GLUCOSE      POCT Blood Glucose.: 220 mg/dL (03 Nov 2022 09:03)  POCT Blood Glucose.: 348 mg/dL (02 Nov 2022 21:44)  POCT Blood Glucose.: 309 mg/dL (02 Nov 2022 17:31)  POCT Blood Glucose.: 274 mg/dL (02 Nov 2022 12:19)            RADIOLOGY & ADDITIONAL TESTS:    Imaging Personally Reviewed:  [x] YES  [ ] NO    Will obtain old records:  [ ] YES  [x] NO

## 2022-11-03 NOTE — PROGRESS NOTE ADULT - PROBLEM SELECTOR PLAN 1
events noted: seen by thoracic surgery  : was supposed to get pleurl effusion drained today  : but labs needed to be optimized:  now due for tomorrow: he recently had thoracenteses  done on both sides at [facility]:  has poor LV function:  currently on diuresis    10/27: diuresis being done : no chest tube done as he is not optimized for the OR yet:  appreciate thoracic consult:  cont diuresis for now:  chest xray today seems  with better oxygenation    10/28: he seems to doing  ok ; no sob:  has large effusion on the  left side and he was recently tapped at [facility]:  has severe LV dysfunction:: pl for drainage on Monday : dw acp thoracic    10/29: still has large effusion on left side:  awaiting procedure on left side    10/30: seems OK : stephen any sob:  but he looks sob to me:  awaiting pl fluid drainage in am    10/31: drained about 1500 cc of fluid:    11/1: he has lot of phlegm in his chest which he isnot able to cough  out: start chest vest; chest apt AGGRESSIVE AND USE BiPAP FOR WOB:  IF HE CANT USE BIP : USE HIGH FLOW AND INCENTIVE SPIROMETRY: HIS CHEST X-RAY WITH ATELECTASIS  : NO PTX : HE HEEDS AGGRESSIVE CHEST TOILET OTHER ISE THERE IS A RISK OF HIM GETTING INTUBATED:    11/2: little better then yesterday : still on chest vest: his ABG from yesterday with reasonable ph:  and 59 pco2: he has pretty bad ef:  and his chest xray is suggestive of chf: cont iv steroids today too with BD and decrease to 20 mg   q 12 hours from am  : use bipap prn as needed and full time at night time:    11/13:  11/3: seems to be doing very poorly:  dnr and dni : cont current management:

## 2022-11-03 NOTE — PROGRESS NOTE ADULT - ASSESSMENT
TTE with Doppler (w/Cont) (04.03.22 @ 15:07) >  mild aortic stenosis. . Mild left ventricular systolic dysfunction. The inferior wall, and the inferoseptum are hypokinetic.  Echo 9/15/22: .EF 35-40% Severe segmental left ventricular systolic dysfunction. The mid to distal anteroseptum and severely hypokinetic. The mid to basal inferolateral wall is hypokinetic.      a/p  86 yo male pmhx BPH, PAD s/p R BKA, COPD (not on home O2), HTN, HLD, afib, PPM ([employer])  CVA w/ residual L hemiparesis on coumadin, insulin-dependent diabetes, sys CHF on lasix presents with shortness of breath    #SOB   -resp status now worsened  -on high flow   -cont iv lasix   -s/p 1500 cc thoracentesis 10/31/2022  -Pulm/Med/thoracic  f/u  -recent echo with .EF 35-40% Severe segmental left ventricular systolic dysfunction. The mid to distal anteroseptum and severely hypokinetic. The mid to basal inferolateral wall is hypokinetic.  -thoracic surgery f/u    # acute on chronic systolic CHF   -overloaded  -IV lasix as above   -echo with moderate severe lv dysfxn, unchanged from echo  4/2022  -continue medical management of CMP/HF  -ecg, no ischemic changes, HS trop elevated secondary to hypoxia/ chf/ resp infection   -c/w bb     #Pafib, sp PPM   -c/w BB  -off oral ac  -cont AC    #mild as   -echo stable     #Abnl UA  -abx per med      #hypernatremia   -renal f/u    45 minutes spent on total encounter; more than 50% of the visit was spent counseling and/or coordinating care by the attending physician.    poor prognosis  dnr/dni  med f/u

## 2022-11-03 NOTE — PROGRESS NOTE ADULT - ASSESSMENT
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

## 2022-11-03 NOTE — PROGRESS NOTE ADULT - SUBJECTIVE AND OBJECTIVE BOX
Date of Service: 11-03-22 @ 14:16    Patient is a 87y old  Male who presents with a chief complaint of Acute hypoxic respiratory failure (03 Nov 2022 11:58)      Any change in ROS:   on 100 % NRB  : very lethargic : barely open years  MEDICATIONS  (STANDING):  albuterol/ipratropium for Nebulization 3 milliLiter(s) Nebulizer every 6 hours  aspirin enteric coated 81 milliGRAM(s) Oral daily  atorvastatin 80 milliGRAM(s) Oral at bedtime  BACItracin   Ointment 1 Application(s) Topical daily  buDESOnide    Inhalation Suspension 0.5 milliGRAM(s) Inhalation every 12 hours  collagenase Ointment 1 Application(s) Topical daily  dextrose 5%. 1000 milliLiter(s) (50 mL/Hr) IV Continuous <Continuous>  dextrose 5%. 1000 milliLiter(s) (100 mL/Hr) IV Continuous <Continuous>  dextrose 50% Injectable 25 Gram(s) IV Push once  dextrose 50% Injectable 25 Gram(s) IV Push once  dextrose 50% Injectable 12.5 Gram(s) IV Push once  dextrose 50% Injectable 25 Gram(s) IV Push once  enoxaparin Injectable 90 milliGRAM(s) SubCutaneous every 12 hours  finasteride 5 milliGRAM(s) Oral daily  furosemide   Injectable 80 milliGRAM(s) IV Push two times a day  glucagon  Injectable 1 milliGRAM(s) IntraMuscular once  insulin glargine Injectable (LANTUS) 7 Unit(s) SubCutaneous at bedtime  insulin lispro (ADMELOG) corrective regimen sliding scale   SubCutaneous three times a day before meals  insulin lispro (ADMELOG) corrective regimen sliding scale   SubCutaneous at bedtime  levothyroxine Injectable 20 MICROGram(s) IV Push at bedtime  methylPREDNISolone sodium succinate Injectable 20 milliGRAM(s) IV Push every 12 hours  metoprolol tartrate 50 milliGRAM(s) Oral two times a day  montelukast 10 milliGRAM(s) Oral daily  pantoprazole  Injectable 40 milliGRAM(s) IV Push every 24 hours  polyethylene glycol 3350 17 Gram(s) Oral every 12 hours  tamsulosin 0.4 milliGRAM(s) Oral at bedtime    MEDICATIONS  (PRN):  acetaminophen     Tablet .. 650 milliGRAM(s) Oral every 6 hours PRN Mild Pain (1 - 3), Moderate Pain (4 - 6)  dextrose Oral Gel 15 Gram(s) Oral once PRN Blood Glucose LESS THAN 70 milliGRAM(s)/deciliter    Vital Signs Last 24 Hrs  T(C): 36.3 (03 Nov 2022 13:41), Max: 36.7 (03 Nov 2022 05:20)  T(F): 97.3 (03 Nov 2022 13:41), Max: 98 (03 Nov 2022 05:20)  HR: 102 (03 Nov 2022 13:41) (73 - 103)  BP: 110/63 (03 Nov 2022 13:41) (110/63 - 151/84)  BP(mean): --  RR: 20 (03 Nov 2022 13:41) (18 - 20)  SpO2: 100% (03 Nov 2022 13:41) (97% - 100%)    Parameters below as of 03 Nov 2022 13:41  Patient On (Oxygen Delivery Method): mask, nonrebreather        I&O's Summary    02 Nov 2022 07:01  -  03 Nov 2022 07:00  --------------------------------------------------------  IN: 1000 mL / OUT: 2150 mL / NET: -1150 mL          Physical Exam:   GENERAL: obese on 100% nrb    HEENT: PETRONA/   Atraumatic, Normocephalic  ENMT: No tonsillar erythema, exudates, or enlargement; Moist mucous membranes, Good dentition, No lesions  NECK: Supple, No JVD, Normal thyroid  CHEST/LUNG: decreased air entry bilaterally: no wheezing  CVS: Regular rate and rhythm; No murmurs, rubs, or gallops  GI: : Soft, Nontender, Nondistended; Bowel sounds present  NERVOUS SYSTEM:  lethargic  EXTREMITIES:  + edema  LYMPH: No lymphadenopathy noted  SKIN: No rashes or lesions  ENDOCRINOLOGY: No Thyromegaly  PSYCH: Appropriate    Labs:  34                            10.5   4.38  )-----------( 138      ( 03 Nov 2022 06:55 )             38.4                         9.4    4.15  )-----------( 123      ( 02 Nov 2022 05:30 )             33.5                         8.5    8.18  )-----------( 125      ( 01 Nov 2022 07:34 )             29.5                         8.5    7.70  )-----------( 115      ( 31 Oct 2022 07:12 )             31.7                         7.9    6.03  )-----------( 115      ( 31 Oct 2022 05:40 )             27.5     11-03    142  |  101  |  19  ----------------------------<  219<H>  4.9   |  30  |  0.95  11-02    143  |  103  |  15  ----------------------------<  215<H>  4.1   |  30  |  0.82  11-01    142  |  102  |  14  ----------------------------<  143<H>  3.3<L>   |  32<H>  |  0.76  10-31    147<H>  |  102  |  23  ----------------------------<  75  4.6   |  27  |  0.91  10-31    145  |  105  |  18  ----------------------------<  164<H>  3.3<L>   |  32<H>  |  0.88  10-30    147<H>  |  106  |  20  ----------------------------<  211<H>  3.5   |  39<H>  |  0.90    Ca    8.7      03 Nov 2022 05:50  Ca    8.3<L>      02 Nov 2022 07:02  Phos  3.3     11-03  Phos  3.5     11-02  Mg     1.70     11-03  Mg     1.60     11-02    TPro  5.6<L>  /  Alb  x   /  TBili  x   /  DBili  x   /  AST  x   /  ALT  x   /  AlkPhos  x   11-01    CAPILLARY BLOOD GLUCOSE      POCT Blood Glucose.: 297 mg/dL (03 Nov 2022 12:20)  POCT Blood Glucose.: 220 mg/dL (03 Nov 2022 09:03)  POCT Blood Glucose.: 348 mg/dL (02 Nov 2022 21:44)  POCT Blood Glucose.: 309 mg/dL (02 Nov 2022 17:31)     (10-31 @ 14:43)    LIVER FUNCTIONS - ( 01 Nov 2022 15:22 )  Alb: x     / Pro: 5.6 g/dL / ALK PHOS: x     / ALT: x     / AST: x     / GGT: x               Procalcitonin, Serum: 0.20 ng/mL (11-01 @ 15:22)  Fluid Source --  Albumin, Fluid--  Glucose, Fluid--  Protein total, Fluid--  Lacatate Dehydrogenase, Fluid--  pH, Fluid--  Cytopathology-Non Gyn Report  ACCESSION No:  [id number]  Patient:   LIBRA PEÑA      Accession:                             [id number]    Collected Date/Time:                   10/31/2022 14:43 EDT  Received Date/Time:                    10/31/2022 23:30 EDT    Non-Gynecologic Report - Auth (Verified)    Specimen(s) Submitted    PLEURAL FLUID    Final Diagnosis  PLEURAL FLUID  NEGATIVE FOR MALIGNANT CELLS.    Cytology slides and cell block consist of benign mesothelial cells and    histiocytes.  Screened by: Gabriella GURROLA (ASCP)  Verified by: Rebecca Ledezma MD  (Electronic Signature)  Reported on: 11/02/22 19:47 EDT, [facility], [address] [place] [address]  Phone: [phone]   Fax: [phone]  Cytologytechnical processing performed at [address] [place] [id number]  _________________________________________________________________      Clinical Information  Pleural effusion; CHF.    Gross Description  Received: 20 ml of unfixed dark-yellow fluid. CytoLyt added in the Lab.  Prepared: 1 ThinPrep slide, 1 Cell block, 1 Smear  Fluid Source --  Albumin, Fluid1.2 g/dL  Glucose, [id number] mg/dL  Protein total, Fluid2.5 g/dL  Lacatate Dehydrogenase, [id number] U/L  pH, Fluid8.4  Cytopathology-Non Gyn Report--        RECENT CULTURES:  10-31 @ 12:48 Pleural Fl Pleural Fluid       polymorphonuclear leukocytes seen  No organisms seen  by cytocentrifuge           No growth          RESPIRATORY CULTURES:          Studies  Chest X-RAY  CT SCAN Chest   Venous Dopplers: LE:   CT Abdomen  Others          rad< from: Xray Chest 1 View- PORTABLE-Routine (Xray Chest 1 View- PORTABLE-Routine in AM.) (11.03.22 @ 09:52) >  EXAM: Portable chest    FINDINGS:  Moderate layering left effusion is present. Mild ischemic congestion   unchanged. Heart size is stable. Small layering right effusion. No focal   pulmonary consolidations. No pneumothorax.        COMPARISON: November 2        IMPRESSION: Follow-up post left thoracentesis with residual fluid on this   side but no detectable pneumothorax.    --- End of Report ---            JUNIOR MENG MD; Attending Radiologist  This document has been electronically signed. Nov  3 2022 12:25PM    < end of copied text >  < from: Xray Chest 1 View- PORTABLE-Routine (Xray Chest 1 View- PORTABLE-Routine in AM.) (11.02.22 @ 06:59) >    ACC: [id number] EXAM:  XR CHEST PORTABLE ROUTINE 1V                          PROCEDURE DATE:  11/02/2022          INTERPRETATION:  Chest one view    HISTORY: Postthoracentesis    COMPARISON STUDY: 11/1/2022    Frontal expiratory view of the chest shows the heart to be similar in   size. The lungs show less pulmonary congestion with small left effusion   and there is no evidence of pneumothorax nor definite right pleural   effusion.    IMPRESSION:  No pneumothorax.        Thank you for the courtesy of this referral.    --- End of Report ---            HORACIO HELMS MD; Attending Interventional Radiologist  This document has been electronically signed. Nov 2 2022  4:13PM    < end of copied text >

## 2022-11-03 NOTE — PROGRESS NOTE ADULT - SUBJECTIVE AND OBJECTIVE BOX
Patient is a 87y old  Male who presents with a chief complaint of Acute hypoxic respiratory failure (02 Nov 2022 16:37)       INTERVAL HPI/OVERNIGHT EVENTS:  Patient seen and evaluated at bedside.  Pt is resting comfortable in NAD. Denies N/V/F/C.  Pain rated at X/10    Allergies    No Known Allergies    Intolerances        Vital Signs Last 24 Hrs  T(C): 36.7 (03 Nov 2022 05:20), Max: 36.7 (03 Nov 2022 05:20)  T(F): 98 (03 Nov 2022 05:20), Max: 98 (03 Nov 2022 05:20)  HR: 100 (03 Nov 2022 05:20) (73 - 103)  BP: 145/82 (03 Nov 2022 05:20) (101/82 - 148/76)  BP(mean): --  RR: 19 (03 Nov 2022 05:20) (18 - 20)  SpO2: 100% (03 Nov 2022 05:20) (97% - 100%)    Parameters below as of 03 Nov 2022 05:20  Patient On (Oxygen Delivery Method): nasal cannula,highflow  O2 Flow (L/min): 40  O2 Concentration (%): 40    LABS:                        9.4    4.15  )-----------( 123      ( 02 Nov 2022 05:30 )             33.5     11-03    142  |  101  |  19  ----------------------------<  219<H>  4.9   |  30  |  0.95    Ca    8.7      03 Nov 2022 05:50  Phos  3.3     11-03  Mg     1.70     11-03    TPro  5.6<L>  /  Alb  x   /  TBili  x   /  DBili  x   /  AST  x   /  ALT  x   /  AlkPhos  x   11-01        CAPILLARY BLOOD GLUCOSE      POCT Blood Glucose.: 348 mg/dL (02 Nov 2022 21:44)  POCT Blood Glucose.: 309 mg/dL (02 Nov 2022 17:31)  POCT Blood Glucose.: 274 mg/dL (02 Nov 2022 12:19)  POCT Blood Glucose.: 229 mg/dL (02 Nov 2022 08:42)      Lower Extremity Physical Exam:  s/p RLE BKA amputation  Vasular: DP/PT 0/4, L, CFT < 3 seconds L, Temperature gradient warm to cool, L.   Neuro: Epicritic sensation diminished to the level of the toes, L.  Musculoskeletal/Ortho: R BKA  Skin: punctate posterior heel wound to level of subq with fibrotic base, no undermining or tracking, no purulence or malodor, lateral 5th MPJ punctate wound to level of capsule with fibrotic base, mild periwound erythema, no drainage or clinical signs of infection. Right BKA.    RADIOLOGY & ADDITIONAL TESTS:

## 2022-11-03 NOTE — PROGRESS NOTE ADULT - NSPROGADDITIONALINFOA_GEN_ALL_CORE
paged acp : awaiting response:    10/28: haley acp and thoracic NP    10/29: paged acp    10/30: dw acp    10/31: haley acp    11/1 : dc acp    11/2: pt Is not doing well : haley acp    11/13: pt is doing very poorly:  on 100% NRB  : cont current care  : palliative care following ? for home hospice: haley acp

## 2022-11-03 NOTE — PROGRESS NOTE ADULT - SUBJECTIVE AND OBJECTIVE BOX
CARDIOLOGY FOLLOW UP NOTE - DR. MARI    Patient Name: LIBRA PEÑA  Date of Service: 22 @ 11:58    Patient seen and examined  events noted  on high flow   lethargic   DNR/DNI      Subjective:    uto      PHYSICAL EXAM:  T(C): 36.4 (22 @ 09:20), Max: 36.7 (22 @ 05:20)  HR: 100 (22 @ 10:01) (73 - 103)  BP: 139/83 (22 @ 09:20) (130/85 - 148/76)  RR: 20 (22 @ 10:01) (18 - 20)  SpO2: 100% (22 @ 10:01) (97% - 100%)  Wt(kg): --  I&O's Summary    2022 07:01  -  2022 07:00  --------------------------------------------------------  IN: 1000 mL / OUT: 2150 mL / NET: -1150 mL      Daily     Daily Weight in k.9 (2022 05:20)    Appearance: lethargic 	tachypneic  Cardiovascular: Normal S1 S2,RRR, No JVD, No murmurs  Respiratory: Lungs clear to auscultation	  Gastrointestinal:  Soft, Non-tender, + BS	  Extremities: Normal range of motion,+ edema       Home Medications:  acetaminophen 325 mg oral tablet: 2 tab(s) orally every 6 hours, As needed, Moderate Pain (4 - 6) (23 Sep 2022 16:43)  acetylcysteine 20% inhalation solution: 4 milliliter(s) inhaled 4 times a day as needed (25 Oct 2022 10:37)  acetylcysteine 20% inhalation solution: 4 milliliter(s) inhaled 3 times a day (23 Sep 2022 16:43)  aspirin 81 mg oral delayed release tablet: 1 tab(s) orally once a day (23 Sep 2022 16:43)  Aspirin Enteric Coated 81 mg oral delayed release tablet: 1 tab(s) orally once a day (25 Oct 2022 10:37)  atorvastatin 80 mg oral tablet: 1 tab(s) orally once a day (at bedtime) (23 Sep 2022 16:43)  bacitracin 500 units/g topical ointment: Apply topically to affected area twice to back (25 Oct 2022 10:37)  budesonide 0.5 mg/2 mL inhalation suspension: 0.5 milligram(s) inhaled 2 times a day (14 Sep 2022 19:13)  budesonide 0.5 mg/2 mL inhalation suspension: 2 milliliter(s) inhaled 2 times a day (25 Oct 2022 10:37)  cadexomer iodine 0.9% topical gel: 1 application topically once a day (23 Sep 2022 16:43)  Coumadin 3 mg oral tablet: 1 tab(s) orally once a day (at bedtime) (23 Sep 2022 16:43)  Coumadin 4 mg oral tablet: 1 tab(s) orally once a day (25 Oct 2022 10:37)  finasteride 5 mg oral tablet: 1 tab(s) orally once a day (14 Sep 2022 19:13)  finasteride 5 mg oral tablet: 1 tab(s) orally once a day (25 Oct 2022 10:37)  Flomax 0.4 mg oral capsule: 1 cap(s) orally once a day (25 Oct 2022 10:37)  gabapentin 100 mg oral capsule: 1 tab(s) orally 2 times a day (25 Oct 2022 10:37)  gabapentin 100 mg oral tablet: 1 tab(s) orally 2 times a day (14 Sep 2022 19:13)  guaifenesin-dextromethorphan 100 mg-10 mg/5 mL oral liquid: 10 milliliter(s) orally every 6 hours (23 Sep 2022 16:43)  insulin glargine 100 units/mL subcutaneous solution: 10 unit(s) subcutaneous once a day (at bedtime) (23 Sep 2022 16:43)  insulin lispro 100 units/mL injectable solution: 14 unit(s) injectable once a day before dinner (23 Sep 2022 18:00)  insulin lispro 100 units/mL injectable solution: 14 unit(s) injectable once a day before lunch (23 Sep 2022 18:00)  insulin lispro 100 units/mL injectable solution: 20 unit(s) injectable once a day before Breakfast (23 Sep 2022 18:00)  insulin lispro 100 units/mL subcutaneous solution: 14 unit(s) subcutaneous before lunch and dinner (25 Oct 2022 10:37)  Iodosorb 0.9% topical gel: to heel (25 Oct 2022 10:37)  ipratropium: inhalation as needed (25 Oct 2022 10:37)  ipratropium-albuterol 0.5 mg-2.5 mg/3 mL inhalation solution: 3 milliliter(s) inhaled every 6 hours (23 Sep 2022 16:43)  Lantus 100 units/mL subcutaneous solution: 10 unit(s) subcutaneous once a day (at bedtime) (25 Oct 2022 10:37)  Lasix 20 mg oral tablet: 1 tab(s) orally once a day (25 Oct 2022 10:37)  Lipitor 80 mg oral tablet: 1 tab(s) orally once a day (25 Oct 2022 10:37)  medihoney:  (25 Oct 2022 10:37)  metoprolol tartrate 50 mg oral tablet: 1 tab(s) orally 2 times a day (23 Sep 2022 16:43)  Metoprolol Tartrate 50 mg oral tablet: 1 tab(s) orally 2 times a day (25 Oct 2022 10:37)  montelukast 10 mg oral tablet: 1 tab(s) orally once a day (at bedtime) (23 Sep 2022 16:43)  Multiple Vitamins with Minerals oral tablet: 1 tab(s) orally once a day (23 Sep 2022 16:43)  pantoprazole 40 mg oral delayed release tablet: 1 tab(s) orally once a day (before a meal) (23 Sep 2022 16:43)  polyethylene glycol 3350 oral powder for reconstitution: 17 gram(s) orally once a day (23 Sep 2022 16:43)  predniSONE 50 mg oral tablet: 1 tab(s) orally once a day for one more dose on 22 then D/C (23 Sep 2022 16:43)  Protonix 40 mg oral delayed release tablet: 1 tab(s) orally once a day (25 Oct 2022 10:37)  senna leaf extract oral tablet: 2 tab(s) orally once a day (at bedtime) (23 Sep 2022 16:43)  Singulair 10 mg oral tablet: 1 tab(s) orally once a day (25 Oct 2022 10:37)  sodium chloride 3% inhalation solution: 4 milliliter(s) inhaled every 12 hours (23 Sep 2022 16:43)  Synthroid 25 mcg (0.025 mg) oral tablet: 1 tab(s) orally once a day (25 Oct 2022 10:37)  Synthroid 25 mcg (0.025 mg) oral tablet: 1 tab(s) orally once a day (14 Sep 2022 19:13)  tamsulosin 0.4 mg oral capsule: 2 cap(s) orally once a day (at bedtime) (14 Sep 2022 19:13)  zinc oxide topical cream: Apply topically to affected area twice to AKA (25 Oct 2022 10:37)      MEDICATIONS  (STANDING):  albuterol/ipratropium for Nebulization 3 milliLiter(s) Nebulizer every 6 hours  aspirin enteric coated 81 milliGRAM(s) Oral daily  atorvastatin 80 milliGRAM(s) Oral at bedtime  BACItracin   Ointment 1 Application(s) Topical daily  buDESOnide    Inhalation Suspension 0.5 milliGRAM(s) Inhalation every 12 hours  collagenase Ointment 1 Application(s) Topical daily  dextrose 5%. 1000 milliLiter(s) (100 mL/Hr) IV Continuous <Continuous>  dextrose 5%. 1000 milliLiter(s) (50 mL/Hr) IV Continuous <Continuous>  dextrose 50% Injectable 25 Gram(s) IV Push once  dextrose 50% Injectable 25 Gram(s) IV Push once  dextrose 50% Injectable 12.5 Gram(s) IV Push once  dextrose 50% Injectable 25 Gram(s) IV Push once  enoxaparin Injectable 90 milliGRAM(s) SubCutaneous every 12 hours  finasteride 5 milliGRAM(s) Oral daily  furosemide   Injectable 80 milliGRAM(s) IV Push two times a day  glucagon  Injectable 1 milliGRAM(s) IntraMuscular once  insulin glargine Injectable (LANTUS) 7 Unit(s) SubCutaneous at bedtime  insulin lispro (ADMELOG) corrective regimen sliding scale   SubCutaneous three times a day before meals  insulin lispro (ADMELOG) corrective regimen sliding scale   SubCutaneous at bedtime  levothyroxine Injectable 20 MICROGram(s) IV Push at bedtime  methylPREDNISolone sodium succinate Injectable 20 milliGRAM(s) IV Push every 12 hours  metoprolol tartrate 50 milliGRAM(s) Oral two times a day  montelukast 10 milliGRAM(s) Oral daily  pantoprazole  Injectable 40 milliGRAM(s) IV Push every 24 hours  polyethylene glycol 3350 17 Gram(s) Oral every 12 hours  tamsulosin 0.4 milliGRAM(s) Oral at bedtime      TELEMETRY: 	    ECG:  	  RADIOLOGY:   DIAGNOSTIC TESTING:  [ ] Echocardiogram:  [ ] Catheterization:  [ ] Stress Test:    OTHER: 	    LABS:	 	    CARDIAC MARKERS:                                      10.5   4.38  )-----------( 138      ( 2022 06:55 )             38.4         142  |  101  |  19  ----------------------------<  219<H>  4.9   |  30  |  0.95    Ca    8.7      2022 05:50  Phos  3.3       Mg     1.70         TPro  5.6<L>  /  Alb  x   /  TBili  x   /  DBili  x   /  AST  x   /  ALT  x   /  AlkPhos  x       proBNP:     Lipid Profile:   HgA1c:     Creatinine, Serum: 0.95 mg/dL (22 @ 05:50)  Creatinine, Serum: 0.82 mg/dL (22 @ 07:02)  Creatinine, Serum: 0.76 mg/dL (22 @ 07:34)

## 2022-11-04 NOTE — PROGRESS NOTE ADULT - NSPROGADDITIONALINFOA_GEN_ALL_CORE
paged acp : awaiting response:    10/28: haley acp and thoracic NP    10/29: paged acp    10/30: dw acp    10/31: haley acp    11/1 : dc acp    11/2: pt Is not doing well : haley acp    11/4: haley ac     11/13: pt is doing very poorly:  on 100% NRB  : cont current care  : palliative care following ? for home hospice: haley acp

## 2022-11-04 NOTE — CHART NOTE - NSCHARTNOTEFT_GEN_A_CORE
Patient lost IV access earlier in the day. IV nurse to attempt one time, as per patient's wishes. IV currently being used for IV Lasix given his fluid overloaded state. Discussed this and further GOC with patient's wife and daughter at bedside. Both are requesting to take patient home with hospice as soon as possible. Equipment has not yet been delivered to the home - will need to touch base with hospice over the weekend vs Monday morning. We have discussed further blood draws - will discontinue all morning AM labs as family understands this will not change his management in the long run. We can continue to do vital signs per routine and treat him for hypotension, pain etc. Will need to further discuss anticoagulation with the family. Patient lost IV access earlier in the day. IV nurse to attempt one time, as per patient's wishes. IV currently being used for IV Lasix given his fluid overloaded state. Discussed this and further GOC with patient's wife and daughter at bedside. Both are requesting to take patient home with hospice as soon as possible. Equipment has not yet been delivered to the home - will need to touch base with hospice over the weekend vs Monday morning. We have discussed further blood draws - will discontinue all morning AM labs as family understands this will not change his management in the long run. We can continue to do vital signs per routine and treat him for hypotension, pain etc. Will need to further discuss anticoagulation with the family as unclear why Coumadin vs Lovenox is the only choice for his afib.

## 2022-11-04 NOTE — PROGRESS NOTE ADULT - PROBLEM SELECTOR PLAN 1
10/29: still has large effusion on left side:  awaiting procedure on left side    10/30: seems OK : stephen any sob:  but he looks sob to me:  awaiting pl fluid drainage in am    10/31: drained about 1500 cc of fluid:    11/1: he has lot of phlegm in his chest which he isnot able to cough  out: start chest vest; chest apt AGGRESSIVE AND USE BiPAP FOR WOB:  IF HE CANT USE BIP : USE HIGH FLOW AND INCENTIVE SPIROMETRY: HIS CHEST X-RAY WITH ATELECTASIS  : NO PTX : HE HEEDS AGGRESSIVE CHEST TOILET OTHER ISE THERE IS A RISK OF HIM GETTING INTUBATED:    11/2: little better then yesterday : still on chest vest: his ABG from yesterday with reasonable ph:  and 59 pco2: he has pretty bad ef:  and his chest xray is suggestive of chf: cont iv steroids today too with BD and decrease to 20 mg   q 12 hours from am  : use bipap prn as needed and full time at night time:    11/3: seems to be doing very poorly:  dnr and dni : cont current management:    11/4: he is alert and awake  ; and responding to questions : on 1005 NRB ; change to nasal cannula or high flow:  change to po prednisone

## 2022-11-04 NOTE — PROGRESS NOTE ADULT - SUBJECTIVE AND OBJECTIVE BOX
On 100% NRB  Na noted to be 148, D5W reordered  Pt remains lethargic and confused     Vital Signs Last 24 Hrs  T(C): 36.6 (04 Nov 2022 09:10), Max: 36.8 (03 Nov 2022 17:20)  T(F): 97.9 (04 Nov 2022 09:10), Max: 98.3 (03 Nov 2022 17:20)  HR: 109 (04 Nov 2022 09:10) (84 - 119)  BP: 120/63 (04 Nov 2022 09:10) (103/56 - 151/84)  BP(mean): --  RR: 17 (04 Nov 2022 09:10) (17 - 20)  SpO2: 100% (04 Nov 2022 09:10) (96% - 100%)    I&O's Summary    11-03-22 @ 07:01  -  11-04-22 @ 07:00  --------------------------------------------------------  IN: 650 mL / OUT: 1100 mL / NET: -450 mL        PHYSICAL EXAM:  GENERAL: lethargic  HEAD:  Atraumatic, Normocephalic  EYES: EOMI, PERRLA, conjunctiva and sclera clear  NECK: Supple, No JVD  CHEST/LUNG: mild decrease breath sounds bilaterally; No wheeze   HEART: Irregular rate and rhythm; No murmurs, rubs, or gallops  ABDOMEN: Soft, Nontender, Nondistended; Bowel sounds present  : chronic [place] in place, britney color urine, neg CVAT   Neuro: AAOx1, no focal weakness, mild left UE weakness baseline   EXTREMITIES:  + Peripheral Pulses, No clubbing, cyanosis, + edema, right AKA, left foot dressing d/c/i, + arm edema    LABS:                        8.3    7.33  )-----------( 169      ( 04 Nov 2022 04:30 )             29.1     11-04    148<H>  |  105  |  27<H>  ----------------------------<  245<H>  3.6   |  35<H>  |  1.11    Ca    8.3<L>      04 Nov 2022 04:30  Phos  2.6     11-04  Mg     1.70     11-04        CAPILLARY BLOOD GLUCOSE      POCT Blood Glucose.: 257 mg/dL (04 Nov 2022 08:55)  POCT Blood Glucose.: 285 mg/dL (03 Nov 2022 21:46)  POCT Blood Glucose.: 216 mg/dL (03 Nov 2022 18:21)  POCT Blood Glucose.: 297 mg/dL (03 Nov 2022 12:20)            RADIOLOGY & ADDITIONAL TESTS:    Imaging Personally Reviewed:  [x] YES  [ ] NO    Will obtain old records:  [ ] YES  [x] NO

## 2022-11-04 NOTE — CHART NOTE - NSCHARTNOTEFT_GEN_A_CORE
[facility] [employer].    Due to lack of IV access throughout day, pt did not receive 12hrs of D50 @ 60cc/hr for his Hypernatremia of 148 (corrected to 150).  IVF reordered to start this evening and will reassess BMP in AM.     Vital Signs Last 24 Hrs  T(C): 36.6 (04 Nov 2022 17:10), Max: 36.8 (04 Nov 2022 13:10)  T(F): 97.9 (04 Nov 2022 17:10), Max: 98.2 (04 Nov 2022 13:10)  HR: 111 (04 Nov 2022 17:10) (100 - 119)  BP: 107/63 (04 Nov 2022 17:10) (107/63 - 133/66)  RR: 16 (04 Nov 2022 17:10) (16 - 19)  SpO2: 100% (04 Nov 2022 17:10) (96% - 100%)    Parameters below as of 04 Nov 2022 19:40  Patient On (Oxygen Delivery Method): 9L ANAMIKA Barahona PA  Medicine [id number]

## 2022-11-04 NOTE — PROGRESS NOTE ADULT - ASSESSMENT
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

## 2022-11-04 NOTE — PROGRESS NOTE ADULT - ASSESSMENT
TTE with Doppler (w/Cont) (04.03.22 @ 15:07) >  mild aortic stenosis. . Mild left ventricular systolic dysfunction. The inferior wall, and the inferoseptum are hypokinetic.  Echo 9/15/22: .EF 35-40% Severe segmental left ventricular systolic dysfunction. The mid to distal anteroseptum and severely hypokinetic. The mid to basal inferolateral wall is hypokinetic.      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, insulin-dependent diabetes, sys CHF on lasix presents with shortness of breath    #SOB   -resp status now worsened  -on high flow   -cont iv lasix   -s/p 1500 cc thoracentesis 10/31/2022  -Pulm/Med/thoracic  f/u  -recent echo with .EF 35-40% Severe segmental left ventricular systolic dysfunction. The mid to distal anteroseptum and severely hypokinetic. The mid to basal inferolateral wall is hypokinetic.  -thoracic surgery f/u    # acute on chronic systolic CHF   -overloaded  -IV lasix as above   -echo with moderate severe lv dysfxn, unchanged from echo  4/2022  -continue medical management of CMP/HF  -ecg, no ischemic changes, HS trop elevated secondary to hypoxia/ chf/ resp infection   -c/w bb     #Pafib, sp PPM   -c/w BB  -off oral ac  -cont AC    #mild as   -echo stable     #Abnl UA  -abx per med      #hypernatremia   -renal f/u    45 minutes spent on total encounter; more than 50% of the visit was spent counseling and/or coordinating care by the attending physician.    poor prognosis  dnr/dni  med f/u

## 2022-11-04 NOTE — PROGRESS NOTE ADULT - SUBJECTIVE AND OBJECTIVE BOX
Date of Service: 11-04-22 @ 12:53    Patient is a 87y old  Male who presents with a chief complaint of Acute hypoxic respiratory failure (04 Nov 2022 09:42)      Any change in ROS:  on 1005 NRB and he is alert and awake:     MEDICATIONS  (STANDING):  albuterol/ipratropium for Nebulization 3 milliLiter(s) Nebulizer every 6 hours  aspirin enteric coated 81 milliGRAM(s) Oral daily  atorvastatin 80 milliGRAM(s) Oral at bedtime  BACItracin   Ointment 1 Application(s) Topical daily  buDESOnide    Inhalation Suspension 0.5 milliGRAM(s) Inhalation every 12 hours  collagenase Ointment 1 Application(s) Topical daily  dextrose 5%. 1000 milliLiter(s) (100 mL/Hr) IV Continuous <Continuous>  dextrose 5%. 1000 milliLiter(s) (60 mL/Hr) IV Continuous <Continuous>  dextrose 5%. 1000 milliLiter(s) (50 mL/Hr) IV Continuous <Continuous>  dextrose 50% Injectable 25 Gram(s) IV Push once  dextrose 50% Injectable 25 Gram(s) IV Push once  dextrose 50% Injectable 12.5 Gram(s) IV Push once  dextrose 50% Injectable 25 Gram(s) IV Push once  enoxaparin Injectable 90 milliGRAM(s) SubCutaneous every 12 hours  finasteride 5 milliGRAM(s) Oral daily  furosemide   Injectable 80 milliGRAM(s) IV Push two times a day  glucagon  Injectable 1 milliGRAM(s) IntraMuscular once  insulin glargine Injectable (LANTUS) 7 Unit(s) SubCutaneous at bedtime  insulin lispro (ADMELOG) corrective regimen sliding scale   SubCutaneous three times a day before meals  insulin lispro (ADMELOG) corrective regimen sliding scale   SubCutaneous at bedtime  levothyroxine Injectable 20 MICROGram(s) IV Push at bedtime  methylPREDNISolone sodium succinate Injectable 20 milliGRAM(s) IV Push every 12 hours  metoprolol tartrate 50 milliGRAM(s) Oral two times a day  montelukast 10 milliGRAM(s) Oral daily  pantoprazole  Injectable 40 milliGRAM(s) IV Push every 24 hours  polyethylene glycol 3350 17 Gram(s) Oral every 12 hours  potassium chloride    Tablet ER 40 milliEquivalent(s) Oral once  tamsulosin 0.4 milliGRAM(s) Oral at bedtime    MEDICATIONS  (PRN):  acetaminophen     Tablet .. 650 milliGRAM(s) Oral every 6 hours PRN Mild Pain (1 - 3), Moderate Pain (4 - 6)  dextrose Oral Gel 15 Gram(s) Oral once PRN Blood Glucose LESS THAN 70 milliGRAM(s)/deciliter    Vital Signs Last 24 Hrs  T(C): 36.6 (04 Nov 2022 09:10), Max: 36.8 (03 Nov 2022 17:20)  T(F): 97.9 (04 Nov 2022 09:10), Max: 98.3 (03 Nov 2022 17:20)  HR: 109 (04 Nov 2022 09:10) (84 - 119)  BP: 120/63 (04 Nov 2022 09:10) (103/56 - 151/84)  BP(mean): --  RR: 17 (04 Nov 2022 09:10) (17 - 20)  SpO2: 100% (04 Nov 2022 09:10) (96% - 100%)    Parameters below as of 04 Nov 2022 09:10  Patient On (Oxygen Delivery Method): mask, nonrebreather        I&O's Summary    03 Nov 2022 07:01  -  04 Nov 2022 07:00  --------------------------------------------------------  IN: 650 mL / OUT: 1100 mL / NET: -450 mL          Physical Exam:   GENERAL: NAD, well-groomed, well-developed  HEENT: PETRONA/   Atraumatic, Normocephalic  ENMT: No tonsillar erythema, exudates, or enlargement; Moist mucous membranes, Good dentition, No lesions  NECK: Supple, No JVD, Normal thyroid  CHEST/LUNG: mild coarse rhonchi:   CVS: Regular rate and rhythm; No murmurs, rubs, or gallops  GI: : Soft, Nontender, Nondistended; Bowel sounds present  NERVOUS SYSTEM:  Alert & awake  EXTREMITIES:  Mild  edema  LYMPH: No lymphadenopathy noted  SKIN: No rashes or lesions  ENDOCRINOLOGY: No Thyromegaly  PSYCH: Appropriate    Labs:  34                            8.3    7.33  )-----------( 169      ( 04 Nov 2022 04:30 )             29.1                         10.5   4.38  )-----------( 138      ( 03 Nov 2022 06:55 )             38.4                         9.4    4.15  )-----------( 123      ( 02 Nov 2022 05:30 )             33.5                         8.5    8.18  )-----------( 125      ( 01 Nov 2022 07:34 )             29.5     11-04    148<H>  |  105  |  27<H>  ----------------------------<  245<H>  3.6   |  35<H>  |  1.11  11-03    142  |  101  |  19  ----------------------------<  219<H>  4.9   |  30  |  0.95  11-02    143  |  103  |  15  ----------------------------<  215<H>  4.1   |  30  |  0.82  11-01    142  |  102  |  14  ----------------------------<  143<H>  3.3<L>   |  32<H>  |  0.76    Ca    8.3<L>      04 Nov 2022 04:30  Ca    8.7      03 Nov 2022 05:50  Phos  2.6     11-04  Phos  3.3     11-03  Mg     1.70     11-04  Mg     1.70     11-03    TPro  5.6<L>  /  Alb  x   /  TBili  x   /  DBili  x   /  AST  x   /  ALT  x   /  AlkPhos  x   11-01    CAPILLARY BLOOD GLUCOSE      POCT Blood Glucose.: 314 mg/dL (04 Nov 2022 12:40)  POCT Blood Glucose.: 257 mg/dL (04 Nov 2022 08:55)  POCT Blood Glucose.: 285 mg/dL (03 Nov 2022 21:46)  POCT Blood Glucose.: 216 mg/dL (03 Nov 2022 18:21)     (10-31 @ 14:43)          Procalcitonin, Serum: 0.20 ng/mL (11-01 @ 15:22)  Fluid Source --  Albumin, Fluid--  Glucose, Fluid--  Protein total, Fluid--  Lacatate Dehydrogenase, Fluid--  pH, Fluid--  Cytopathology-Non Gyn Report  ACCESSION No:  [id number]  Patient:   LIBRA PEÑA      Accession:                             [id number]    Collected Date/Time:                   10/31/2022 14:43 EDT  Received Date/Time:                    10/31/2022 23:30 EDT    Non-Gynecologic Report - Auth (Verified)    Specimen(s) Submitted    PLEURAL FLUID    Final Diagnosis  PLEURAL FLUID  NEGATIVE FOR MALIGNANT CELLS.    Cytology slides and cell block consist of benign mesothelial cells and    histiocytes.  Screened by: Gabriella GURROLA (ASCP)  Verified by: Rebecca Ledezma MD  (Electronic Signature)  Reported on: 11/02/22 19:47 EDT, [facility], [address] [place] [address]  Phone: [phone]   Fax: [phone]  Cytologytechnical processing performed at [address] [place] [id number]  _________________________________________________________________      Clinical Information  Pleural effusion; CHF.    Gross Description  Received: 20 ml of unfixed dark-yellow fluid. CytoLyt added in the Lab.  Prepared: 1 ThinPrep slide, 1 Cell block, 1 Smear  Fluid Source --  Albumin, Fluid1.2 g/dL  Glucose, [id number] mg/dL  Protein total, Fluid2.5 g/dL  Lacatate Dehydrogenase, [id number] U/L  pH, Fluid8.4  Cytopathology-Non Gyn Report--        RECENT CULTURES:  10-31 @ 12:48 Pleural Fl Pleural Fluid       polymorphonuclear leukocytes seen  No organisms seen  by cytocentrifuge           No growth          RESPIRATORY CULTURES:      rad< from: Xray Chest 1 View- PORTABLE-Routine (Xray Chest 1 View- PORTABLE-Routine in AM.) (11.03.22 @ 09:52) >    ACC: [id number] EXAM:  XR CHEST PORTABLE ROUTINE 1V                          PROCEDURE DATE:  11/03/2022          INTERPRETATION:  CLINICAL INFORMATION: Post left thoracentesis    TIME OF EXAMINATION: November 3 at 9:19 AM    EXAM: Portable chest    FINDINGS:  Moderate layering left effusion is present. Mild ischemic congestion   unchanged. Heart size is stable. Small layering right effusion. No focal   pulmonary consolidations. No pneumothorax.        COMPARISON: November 2        IMPRESSION: Follow-up post left thoracentesis with residual fluid on this   side but no detectable pneumothorax.    --- End of Report ---            JUNIOR MENG MD; Attending Radiologist  This document has been electronically signed. Nov  3 2022 12:25PM    < end of copied text >      Studies  Chest X-RAY  CT SCAN Chest   Venous Dopplers: LE:   CT Abdomen  Others

## 2022-11-04 NOTE — PROGRESS NOTE ADULT - SUBJECTIVE AND OBJECTIVE BOX
CARDIOLOGY FOLLOW UP - Dr. Stephens  Date of Service: 11/4/2022  CC: events noted    Review of Systems:  Constitutional: No fever, weight loss, or fatigue  Respiratory: No cough, wheezing, or hemoptysis, no shortness of breath  Cardiovascular: No chest pain, palpitations, passing out, dizziness, or leg swelling  Gastrointestinal: No abd or epigastric pain. No nausea, vomiting, or hematemesis; no diarrhea or consiptaiton, no melena or hematochezia  Vascular: No edema     TELEMETRY:    PHYSICAL EXAM:  T(C): 36.6 (11-04-22 @ 09:10), Max: 36.8 (11-03-22 @ 17:20)  HR: 109 (11-04-22 @ 09:10) (100 - 119)  BP: 120/63 (11-04-22 @ 09:10) (103/56 - 125/64)  RR: 17 (11-04-22 @ 09:10) (17 - 20)  SpO2: 100% (11-04-22 @ 09:10) (96% - 100%)  Wt(kg): --  I&O's Summary    03 Nov 2022 07:01  -  04 Nov 2022 07:00  --------------------------------------------------------  IN: 650 mL / OUT: 1100 mL / NET: -450 mL        Appearance: Normal	  Cardiovascular: Normal S1 S2,RRR, No JVD, No murmurs  Respiratory: Lungs clear to auscultation	  Gastrointestinal:  Soft, Non-tender, + BS	  Extremities: Normal range of motion, No clubbing, cyanosis or edema  Vascular: Peripheral pulses palpable 2+ bilaterally       Home Medications:  acetaminophen 325 mg oral tablet: 2 tab(s) orally every 6 hours, As needed, Moderate Pain (4 - 6) (23 Sep 2022 16:43)  acetylcysteine 20% inhalation solution: 4 milliliter(s) inhaled 4 times a day as needed (25 Oct 2022 10:37)  acetylcysteine 20% inhalation solution: 4 milliliter(s) inhaled 3 times a day (23 Sep 2022 16:43)  aspirin 81 mg oral delayed release tablet: 1 tab(s) orally once a day (23 Sep 2022 16:43)  Aspirin Enteric Coated 81 mg oral delayed release tablet: 1 tab(s) orally once a day (25 Oct 2022 10:37)  atorvastatin 80 mg oral tablet: 1 tab(s) orally once a day (at bedtime) (23 Sep 2022 16:43)  bacitracin 500 units/g topical ointment: Apply topically to affected area twice to back (25 Oct 2022 10:37)  budesonide 0.5 mg/2 mL inhalation suspension: 0.5 milligram(s) inhaled 2 times a day (14 Sep 2022 19:13)  budesonide 0.5 mg/2 mL inhalation suspension: 2 milliliter(s) inhaled 2 times a day (25 Oct 2022 10:37)  cadexomer iodine 0.9% topical gel: 1 application topically once a day (23 Sep 2022 16:43)  Coumadin 3 mg oral tablet: 1 tab(s) orally once a day (at bedtime) (23 Sep 2022 16:43)  Coumadin 4 mg oral tablet: 1 tab(s) orally once a day (25 Oct 2022 10:37)  finasteride 5 mg oral tablet: 1 tab(s) orally once a day (14 Sep 2022 19:13)  finasteride 5 mg oral tablet: 1 tab(s) orally once a day (25 Oct 2022 10:37)  Flomax 0.4 mg oral capsule: 1 cap(s) orally once a day (25 Oct 2022 10:37)  gabapentin 100 mg oral capsule: 1 tab(s) orally 2 times a day (25 Oct 2022 10:37)  gabapentin 100 mg oral tablet: 1 tab(s) orally 2 times a day (14 Sep 2022 19:13)  guaifenesin-dextromethorphan 100 mg-10 mg/5 mL oral liquid: 10 milliliter(s) orally every 6 hours (23 Sep 2022 16:43)  insulin glargine 100 units/mL subcutaneous solution: 10 unit(s) subcutaneous once a day (at bedtime) (23 Sep 2022 16:43)  insulin lispro 100 units/mL injectable solution: 14 unit(s) injectable once a day before dinner (23 Sep 2022 18:00)  insulin lispro 100 units/mL injectable solution: 14 unit(s) injectable once a day before lunch (23 Sep 2022 18:00)  insulin lispro 100 units/mL injectable solution: 20 unit(s) injectable once a day before Breakfast (23 Sep 2022 18:00)  insulin lispro 100 units/mL subcutaneous solution: 14 unit(s) subcutaneous before lunch and dinner (25 Oct 2022 10:37)  Iodosorb 0.9% topical gel: to heel (25 Oct 2022 10:37)  ipratropium: inhalation as needed (25 Oct 2022 10:37)  ipratropium-albuterol 0.5 mg-2.5 mg/3 mL inhalation solution: 3 milliliter(s) inhaled every 6 hours (23 Sep 2022 16:43)  Lantus 100 units/mL subcutaneous solution: 10 unit(s) subcutaneous once a day (at bedtime) (25 Oct 2022 10:37)  Lasix 20 mg oral tablet: 1 tab(s) orally once a day (25 Oct 2022 10:37)  Lipitor 80 mg oral tablet: 1 tab(s) orally once a day (25 Oct 2022 10:37)  medihoney:  (25 Oct 2022 10:37)  metoprolol tartrate 50 mg oral tablet: 1 tab(s) orally 2 times a day (23 Sep 2022 16:43)  Metoprolol Tartrate 50 mg oral tablet: 1 tab(s) orally 2 times a day (25 Oct 2022 10:37)  montelukast 10 mg oral tablet: 1 tab(s) orally once a day (at bedtime) (23 Sep 2022 16:43)  Multiple Vitamins with Minerals oral tablet: 1 tab(s) orally once a day (23 Sep 2022 16:43)  pantoprazole 40 mg oral delayed release tablet: 1 tab(s) orally once a day (before a meal) (23 Sep 2022 16:43)  polyethylene glycol 3350 oral powder for reconstitution: 17 gram(s) orally once a day (23 Sep 2022 16:43)  predniSONE 50 mg oral tablet: 1 tab(s) orally once a day for one more dose on 9/24/22 then D/C (23 Sep 2022 16:43)  Protonix 40 mg oral delayed release tablet: 1 tab(s) orally once a day (25 Oct 2022 10:37)  senna leaf extract oral tablet: 2 tab(s) orally once a day (at bedtime) (23 Sep 2022 16:43)  Singulair 10 mg oral tablet: 1 tab(s) orally once a day (25 Oct 2022 10:37)  sodium chloride 3% inhalation solution: 4 milliliter(s) inhaled every 12 hours (23 Sep 2022 16:43)  Synthroid 25 mcg (0.025 mg) oral tablet: 1 tab(s) orally once a day (25 Oct 2022 10:37)  Synthroid 25 mcg (0.025 mg) oral tablet: 1 tab(s) orally once a day (14 Sep 2022 19:13)  tamsulosin 0.4 mg oral capsule: 2 cap(s) orally once a day (at bedtime) (14 Sep 2022 19:13)  zinc oxide topical cream: Apply topically to affected area twice to AKA (25 Oct 2022 10:37)        MEDICATIONS  (STANDING):  albuterol/ipratropium for Nebulization 3 milliLiter(s) Nebulizer every 6 hours  aspirin enteric coated 81 milliGRAM(s) Oral daily  atorvastatin 80 milliGRAM(s) Oral at bedtime  BACItracin   Ointment 1 Application(s) Topical daily  buDESOnide    Inhalation Suspension 0.5 milliGRAM(s) Inhalation every 12 hours  collagenase Ointment 1 Application(s) Topical daily  dextrose 5%. 1000 milliLiter(s) (50 mL/Hr) IV Continuous <Continuous>  dextrose 5%. 1000 milliLiter(s) (100 mL/Hr) IV Continuous <Continuous>  dextrose 5%. 1000 milliLiter(s) (60 mL/Hr) IV Continuous <Continuous>  dextrose 50% Injectable 25 Gram(s) IV Push once  dextrose 50% Injectable 25 Gram(s) IV Push once  dextrose 50% Injectable 12.5 Gram(s) IV Push once  dextrose 50% Injectable 25 Gram(s) IV Push once  enoxaparin Injectable 90 milliGRAM(s) SubCutaneous every 12 hours  finasteride 5 milliGRAM(s) Oral daily  furosemide   Injectable 80 milliGRAM(s) IV Push two times a day  glucagon  Injectable 1 milliGRAM(s) IntraMuscular once  insulin glargine Injectable (LANTUS) 7 Unit(s) SubCutaneous at bedtime  insulin lispro (ADMELOG) corrective regimen sliding scale   SubCutaneous three times a day before meals  insulin lispro (ADMELOG) corrective regimen sliding scale   SubCutaneous at bedtime  levothyroxine Injectable 20 MICROGram(s) IV Push at bedtime  metoprolol tartrate 50 milliGRAM(s) Oral two times a day  montelukast 10 milliGRAM(s) Oral daily  pantoprazole  Injectable 40 milliGRAM(s) IV Push every 24 hours  polyethylene glycol 3350 17 Gram(s) Oral every 12 hours  predniSONE   Tablet 20 milliGRAM(s) Oral daily  tamsulosin 0.4 milliGRAM(s) Oral at bedtime        EKG:  RADIOLOGY:  DIAGNOSTIC TESTING:  [ ] Echocardiogram:  [ ] Catherterization:  [ ] Stress Test:  OTHER:     LABS:	 	                          8.3    7.33  )-----------( 169      ( 04 Nov 2022 04:30 )             29.1     11-04    148<H>  |  105  |  27<H>  ----------------------------<  245<H>  3.6   |  35<H>  |  1.11    Ca    8.3<L>      04 Nov 2022 04:30  Phos  2.6     11-04  Mg     1.70     11-04            CARDIAC MARKERS:

## 2022-11-04 NOTE — PROGRESS NOTE ADULT - ASSESSMENT
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

## 2022-11-05 NOTE — PROGRESS NOTE ADULT - ASSESSMENT
TTE with Doppler (w/Cont) (04.03.22 @ 15:07) >  mild aortic stenosis. . Mild left ventricular systolic dysfunction. The inferior wall, and the inferoseptum are hypokinetic.  Echo 9/15/22: .EF 35-40% Severe segmental left ventricular systolic dysfunction. The mid to distal anteroseptum and severely hypokinetic. The mid to basal inferolateral wall is hypokinetic.      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, insulin-dependent diabetes, sys CHF on lasix presents with shortness of breath    #SOB   -resp status now worsened  -on high flow   -cont iv lasix , give additional dose as needed  -s/p 1500 cc thoracentesis 10/31/2022  -Pulm/Med/thoracic  f/u  -recent echo with .EF 35-40% Severe segmental left ventricular systolic dysfunction. The mid to distal anteroseptum and severely hypokinetic. The mid to basal inferolateral wall is hypokinetic.  -thoracic surgery f/u    # acute on chronic systolic CHF   -overloaded  -IV lasix as above   -echo with moderate severe lv dysfxn, unchanged from echo  4/2022  -continue medical management of CMP/HF  -ecg, no ischemic changes, HS trop elevated secondary to hypoxia/ chf/ resp infection   -c/w bb     #Pafib, sp PPM   -c/w BB  -off oral ac  -cont AC    #mild as   -echo stable     #Abnl UA  -abx per med      #hypernatremia   -renal f/u    45 minutes spent on total encounter; more than 50% of the visit was spent counseling and/or coordinating care by the attending physician.    poor prognosis  dnr/dni  med f/u

## 2022-11-05 NOTE — PROVIDER CONTACT NOTE (OTHER) - BACKGROUND
Patient came for SOB, HF,Pleural effusion hypoxic. Ptient also came with a chronic hudson. Hx of DM, HTN. Patient is also on lasix 80 mg IVP
Patient came for SOB, HF,Pleural effusion hypoxic. Ptient also came with a chronic hudson. Hx of DM, HTN. Patient is also on lasix 80 mg IVP
patient admitted for  shortness of breath secondary to CHF exacerbation
Patient came for SOB, HF,Pleural effusion hypoxic. Ptient also came with a chronic hudson. Hx of DM, HTN. Patient is also on lasix 80 mg IVP
patient admitted for  shortness of breath secondary to CHF exacerbation

## 2022-11-05 NOTE — PROGRESS NOTE ADULT - NSPROGADDITIONALINFOA_GEN_ALL_CORE
paged acp : awaiting response:    10/28: haley acp and thoracic NP    10/29: paged acp    10/30: haley acp    10/31: haley acp    11/1 : dc acp    11/2: pt Is not doing well : haley acp    11/4: haley mai:  the HCP in detail : holf off ct scan chest for now: she will talk to her ssiter:     11/4: haley ac     11/13: pt is doing very poorly:  on 100% NRB  : cont current care  : palliative care following ? for home hospice: haley acp paged acp : awaiting response:    10/28: haley acp and thoracic NP    10/29: paged acp    10/30: dw acp    10/31: haley acp    11/1 : dc acp    11/2: pt Is not doing well : haley HCP:  : they do now want any further intervention: no ct scan chest  : cont chest pt and vest therapy : haley acp  : FOR HOME HOSPICE NEXT WEEK     11/4: haley mai:  the HCP in detail : holf off ct scan chest for now: she will talk to her ssiter:     11/4: haley ac     11/13: pt is doing very poorly:  on 100% NRB  : cont current care  : palliative care following ? for home hospice: haley acp

## 2022-11-05 NOTE — PROVIDER CONTACT NOTE (OTHER) - DATE AND TIME:
01-Nov-2022 15:10
02-Nov-2022 07:48
02-Nov-2022 15:20
01-Nov-2022 07:54
05-Nov-2022 10:27
05-Nov-2022 17:55
01-Nov-2022 07:54
01-Nov-2022 14:40
03-Nov-2022 13:41
26-Oct-2022 20:22
26-Oct-2022 22:13
27-Oct-2022 01:30
01-Nov-2022 07:54
01-Nov-2022 11:00
01-Nov-2022 12:35
02-Nov-2022 07:48
30-Oct-2022 20:42
04-Nov-2022 05:57

## 2022-11-05 NOTE — PROGRESS NOTE ADULT - PROBLEM SELECTOR PLAN 1
10/29: still has large effusion on left side:  awaiting procedure on left side    10/30: seems OK : stephen any sob:  but he looks sob to me:  awaiting pl fluid drainage in am    10/31: drained about 1500 cc of fluid:    11/1: he has lot of phlegm in his chest which he isnot able to cough  out: start chest vest; chest apt AGGRESSIVE AND USE BiPAP FOR WOB:  IF HE CANT USE BIP : USE HIGH FLOW AND INCENTIVE SPIROMETRY: HIS CHEST X-RAY WITH ATELECTASIS  : NO PTX : HE HEEDS AGGRESSIVE CHEST TOILET OTHER ISE THERE IS A RISK OF HIM GETTING INTUBATED:    11/2: little better then yesterday : still on chest vest: his ABG from yesterday with reasonable ph:  and 59 pco2: he has pretty bad ef:  and his chest xray is suggestive of chf: cont iv steroids today too with BD and decrease to 20 mg   q 12 hours from am  : use bipap prn as needed and full time at night time:    11/3: seems to be doing very poorly:  dnr and dni : cont current management:    11/4:   seems alert and awake:  on 100? NRB can start bipap to see if it opens up the lug:  ? pl effusion vas mucus plugging : haley mai:  ct scan is agreeable:  and they would agree with pleurax : he is DNR and     11/4: he is alert and awake  ; and responding to questions : on 1005 NRB ; change to nasal cannula or high flow:  change to po prednisone

## 2022-11-05 NOTE — PROVIDER CONTACT NOTE (OTHER) - RECOMMENDATIONS
PA notified, as per PA will come to bedside to assess
As per PA, ordered chest PT vest, duonebs q6 and high flow NC since patient not complaint with bipap. MD ordered IV steroids
PA made aware. Awaiting for orders.
As per PA, at this time continue with aggressive manual chest PT until vest becomes available and teach incentive spirometry
PA notified, as per PA will come to bedside to assess
PA notified, pending further orders
As per PA, continue to monitor O2 on 4L at this moment and wean as tolerated
As per PA, obtain EKG and will come to bedside
DWAYNE Borges notified, as per PA will f/u
PA notified, as per PA will come to bedside to assess
As per PA, administer lopressor and continue to monitor
As per PA, continue to monitor at this time
Continue pt on tele
DWAYNE Borges notified, as per PA will f/u
repeated Blood glucose for 69 and activated hypoglycemia protocol . pushed Dextrose 50% IV. Notify ACP. HOLD basal insulin .

## 2022-11-05 NOTE — PROGRESS NOTE ADULT - PROBLEM SELECTOR PLAN 1
- 2/2 large pleural effusion and pulm vasc congestion  - troponin elevation likely 2/2 CHF exacerbation (stable x 3 sets). card following   - TTE: Severe global left ventricular systolic dysfunction. EF 28%  - tele monitoring: no events. paced rhythm.   - c/w Lasix 40 mg iv BID, has chronic Adair for strict ins and outs.  - pulm consult: Dr. Felton.   - CT surgery on the case for thoracentesis (INR 2.5, procedure canceled)  - vit K PO x 1 for elevated INR in anticipation for thoracentesis, which was done 10/31/22: s/p 1500 cc fluid removed.   - Lovenox 90 mg SQ BID (weight based) bridge, restarted post procedure.   - will not bridge coumadin yet until decision on CT chest made by family

## 2022-11-05 NOTE — PROGRESS NOTE ADULT - ASSESSMENT
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

## 2022-11-05 NOTE — PROVIDER CONTACT NOTE (OTHER) - ASSESSMENT
Patient asymptomatic. Vitals are stable as per flowsheet. Pt is resting comfortably in bed
Patient with dyspnea on exertion, use of accessory muscles and lung sounds are congested. However patient denies shortness of breath.
Patient with episode desaturation while turning, O2 dropped to 82% on 3L, O2 increased to 4L and placed in upright position
Patient with tachycardia. . Patient denies any distress or discomfort. All other vitals stable
patient blood glucose was 59 during dinner time check. repeated Blood glucose for 69 and activated hypoglycemia protocol. patient is asymptomatic. alert and oriented and is about to eat dinner.
Abscess draining purulent drainage. Patient denies any distress or discomfort.
Patient with +4 edema to L ARM. Patient denies any numbness or tingling. +1 weak radial pulse, unable to assess brachial pulse.
Patient with 7 beats of vtach. VS stable. Denies any distress or discomfort
Patient with dyspnea. RR 25 with use of accessory muscles s/p nasal suctioning. Patient with still congested lung sounds.
Patient with use of accessory muscles. Patient denies shortness of breath, respirations 25 on high flow 40/40
Patient with altered mental status. Patient currently A&O x1 only able to state name. When asked if patient knows where he is, patient stated I am [facility]. Patient also stated the year is 2024 and stated he does not know the month
Patient with tachycardia and frequent trigeminy. -120. Patient denies any distress or discomfort. VS stable
As per respiratory, chest PT vest not available at this time
From previous RN report, patient has periods of confusion and sundowns. This AM, patient only oriented to name. no other deficits noted.
Patient only had 200 ml in the hudson bag at 2000. No output for the rest of this shift. Bladder scan performed, 0 ml retained in bladder.

## 2022-11-05 NOTE — PROVIDER CONTACT NOTE (OTHER) - SITUATION
pt have order for Hudson to be removed, pt came in with the hudson. Can you clarify if I am to proceed
Patient only had 200 ml in the hudson bag at 2000. No output for the rest of this shift.
Patient with dyspnea
As per respiratory, chest PT vest not available at this time
Patient had 3 beats of V-tach
Patient with altered mental status
potassium 3.3
pt is diabetic no Sliding scale, ALso pt is on D5 at 50.  I see from the am shift his last accucheck was 282, but no coverage. Can you please put Sliding scale. thanks
Patient with altered mental status
Patient with purulent abscess on R AKA incision line
Patient with tachycardia and frequent trigeminy
hypoglycemia event.
Patient with dyspnea on exertion, use of accessory muscles and lung sounds are congested
Patient with +4 edema to L ARM
Patient with 7 beats of vtach
Patient with episode desaturation while turning
Patient with tachycardia
Patient with use of accessory muscles

## 2022-11-05 NOTE — PROGRESS NOTE ADULT - ASSESSMENT
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

## 2022-11-05 NOTE — PROGRESS NOTE ADULT - SUBJECTIVE AND OBJECTIVE BOX
Overnight events noted by PA  Family mulling over whether they would want to pursue CT chest today    Vital Signs Last 24 Hrs  T(C): 36.6 (05 Nov 2022 05:28), Max: 36.8 (04 Nov 2022 13:10)  T(F): 97.8 (05 Nov 2022 05:28), Max: 98.2 (04 Nov 2022 13:10)  HR: 94 (05 Nov 2022 05:28) (89 - 119)  BP: 141/60 (05 Nov 2022 05:28) (107/63 - 141/60)  BP(mean): --  RR: 19 (05 Nov 2022 05:28) (16 - 19)  SpO2: 100% (05 Nov 2022 05:28) (96% - 100%)    I&O's Summary    11-04-22 @ 07:01  -  11-05-22 @ 07:00  --------------------------------------------------------  IN: 170 mL / OUT: 1500 mL / NET: -1330 mL          PHYSICAL EXAM:  GENERAL: lethargic  HEAD:  Atraumatic, Normocephalic  EYES: EOMI, PERRLA, conjunctiva and sclera clear  NECK: Supple, No JVD  CHEST/LUNG: mild decrease breath sounds bilaterally; No wheeze   HEART: Irregular rate and rhythm; No murmurs, rubs, or gallops  ABDOMEN: Soft, Nontender, Nondistended; Bowel sounds present  : chronic Adair in place, britney color urine, neg CVAT   Neuro: AAOx1, no focal weakness, mild left UE weakness baseline   EXTREMITIES:  + Peripheral Pulses, No clubbing, cyanosis, + edema, right AKA, left foot dressing d/c/i, + arm edema      LABS:                        8.3    7.33  )-----------( 169      ( 04 Nov 2022 04:30 )             29.1     11-04    148<H>  |  105  |  27<H>  ----------------------------<  245<H>  3.6   |  35<H>  |  1.11    Ca    8.3<L>      04 Nov 2022 04:30  Phos  2.6     11-04  Mg     1.70     11-04        CAPILLARY BLOOD GLUCOSE      POCT Blood Glucose.: 306 mg/dL (04 Nov 2022 21:31)  POCT Blood Glucose.: 299 mg/dL (04 Nov 2022 17:49)  POCT Blood Glucose.: 314 mg/dL (04 Nov 2022 12:40)  POCT Blood Glucose.: 257 mg/dL (04 Nov 2022 08:55)            RADIOLOGY & ADDITIONAL TESTS:    Imaging Personally Reviewed:  [x] YES  [ ] NO    Will obtain old records:  [ ] YES  [x] NO

## 2022-11-05 NOTE — PROGRESS NOTE ADULT - SUBJECTIVE AND OBJECTIVE BOX
Date of Service: 11-05-22 @ 14:01    Patient is a 87y old  Male who presents with a chief complaint of Acute hypoxic respiratory failure (05 Nov 2022 10:58)      Any change in ROS:     MEDICATIONS  (STANDING):  albuterol/ipratropium for Nebulization 3 milliLiter(s) Nebulizer every 6 hours  aspirin enteric coated 81 milliGRAM(s) Oral daily  atorvastatin 80 milliGRAM(s) Oral at bedtime  BACItracin   Ointment 1 Application(s) Topical daily  buDESOnide    Inhalation Suspension 0.5 milliGRAM(s) Inhalation every 12 hours  collagenase Ointment 1 Application(s) Topical daily  dextrose 5%. 1000 milliLiter(s) (100 mL/Hr) IV Continuous <Continuous>  dextrose 5%. 1000 milliLiter(s) (50 mL/Hr) IV Continuous <Continuous>  dextrose 5%. 1000 milliLiter(s) (60 mL/Hr) IV Continuous <Continuous>  dextrose 50% Injectable 25 Gram(s) IV Push once  dextrose 50% Injectable 25 Gram(s) IV Push once  dextrose 50% Injectable 12.5 Gram(s) IV Push once  dextrose 50% Injectable 25 Gram(s) IV Push once  enoxaparin Injectable 90 milliGRAM(s) SubCutaneous every 12 hours  finasteride 5 milliGRAM(s) Oral daily  furosemide   Injectable 80 milliGRAM(s) IV Push two times a day  glucagon  Injectable 1 milliGRAM(s) IntraMuscular once  insulin glargine Injectable (LANTUS) 7 Unit(s) SubCutaneous at bedtime  insulin lispro (ADMELOG) corrective regimen sliding scale   SubCutaneous three times a day before meals  insulin lispro (ADMELOG) corrective regimen sliding scale   SubCutaneous at bedtime  levothyroxine Injectable 20 MICROGram(s) IV Push at bedtime  metoprolol tartrate 50 milliGRAM(s) Oral two times a day  montelukast 10 milliGRAM(s) Oral daily  pantoprazole  Injectable 40 milliGRAM(s) IV Push every 24 hours  polyethylene glycol 3350 17 Gram(s) Oral every 12 hours  predniSONE   Tablet 20 milliGRAM(s) Oral daily  tamsulosin 0.4 milliGRAM(s) Oral at bedtime    MEDICATIONS  (PRN):  acetaminophen     Tablet .. 650 milliGRAM(s) Oral every 6 hours PRN Mild Pain (1 - 3), Moderate Pain (4 - 6)  dextrose Oral Gel 15 Gram(s) Oral once PRN Blood Glucose LESS THAN 70 milliGRAM(s)/deciliter    Vital Signs Last 24 Hrs  T(C): 36.7 (05 Nov 2022 10:24), Max: 36.7 (05 Nov 2022 10:24)  T(F): 98 (05 Nov 2022 10:24), Max: 98 (05 Nov 2022 10:24)  HR: 100 (05 Nov 2022 10:24) (89 - 111)  BP: 104/60 (05 Nov 2022 10:24) (104/60 - 141/60)  BP(mean): --  RR: 20 (05 Nov 2022 10:24) (16 - 20)  SpO2: 98% (05 Nov 2022 10:24) (96% - 100%)    Parameters below as of 05 Nov 2022 10:24  Patient On (Oxygen Delivery Method): mask, nonrebreather  O2 Flow (L/min): 10      I&O's Summary    04 Nov 2022 07:01  -  05 Nov 2022 07:00  --------------------------------------------------------  IN: 170 mL / OUT: 1500 mL / NET: -1330 mL          Physical Exam:   GENERAL: NAD, well-groomed, well-developed  HEENT: PETRONA/   Atraumatic, Normocephalic  ENMT: No tonsillar erythema, exudates, or enlargement; Moist mucous membranes, Good dentition, No lesions  NECK: Supple, No JVD, Normal thyroid  CHEST/LUNG: Clear to auscultaion, ; No rales, rhonchi, wheezing, or rubs  CVS: Regular rate and rhythm; No murmurs, rubs, or gallops  GI: : Soft, Nontender, Nondistended; Bowel sounds present  NERVOUS SYSTEM:  Alert & Oriented X3, Good concentration; Motor Strength 5/5 B/L upper and lower extremities; DTRs 2+ intact and symmetric  EXTREMITIES:  RT bkA  LYMPH: No lymphadenopathy noted  SKIN: No rashes or lesions  ENDOCRINOLOGY: No Thyromegaly  PSYCH: Appropriate    Labs:  34                            8.3    7.33  )-----------( 169      ( 04 Nov 2022 04:30 )             29.1                         10.5   4.38  )-----------( 138      ( 03 Nov 2022 06:55 )             38.4                         9.4    4.15  )-----------( 123      ( 02 Nov 2022 05:30 )             33.5     11-04    148<H>  |  105  |  27<H>  ----------------------------<  245<H>  3.6   |  35<H>  |  1.11  11-03    142  |  101  |  19  ----------------------------<  219<H>  4.9   |  30  |  0.95  11-02    143  |  103  |  15  ----------------------------<  215<H>  4.1   |  30  |  0.82    Ca    8.3<L>      04 Nov 2022 04:30  Phos  2.6     11-04  Mg     1.70     11-04    TPro  5.6<L>  /  Alb  x   /  TBili  x   /  DBili  x   /  AST  x   /  ALT  x   /  AlkPhos  x   11-01    CAPILLARY BLOOD GLUCOSE      POCT Blood Glucose.: 328 mg/dL (05 Nov 2022 12:27)  POCT Blood Glucose.: 274 mg/dL (05 Nov 2022 08:51)  POCT Blood Glucose.: 306 mg/dL (04 Nov 2022 21:31)  POCT Blood Glucose.: 299 mg/dL (04 Nov 2022 17:49)     (10-31 @ 14:43)          Procalcitonin, Serum: 0.20 ng/mL (11-01 @ 15:22)  Fluid Source --  Albumin, Fluid--  Glucose, Fluid--  Protein total, Fluid--  Lacatate Dehydrogenase, Fluid--  pH, Fluid--  Cytopathology-Non Gyn Report  ACCESSION No:  [id number]  Patient:   LIBRA PEÑA      Accession:                             [id number]    Collected Date/Time:                   10/31/2022 14:43 EDT  Received Date/Time:                    10/31/2022 23:30 EDT    Non-Gynecologic Report - Auth (Verified)    Specimen(s) Submitted    PLEURAL FLUID    Final Diagnosis  PLEURAL FLUID  NEGATIVE FOR MALIGNANT CELLS.    Cytology slides and cell block consist of benign mesothelial cells and    histiocytes.  Screened by: Gabriella GURROLA (ASCP)  Verified by: Rebecca Ledezma MD  (Electronic Signature)  Reported on: 11/02/22 19:47 EDT, [facility], [address] [place] [address] [address], [place] [address]  Phone: [phone]   Fax: [phone]  Cytologytechnical processing performed at [address] [place] [id number]  _________________________________________________________________      Clinical Information  Pleural effusion; CHF.    Gross Description  Received: 20 ml of unfixed dark-yellow fluid. CytoLyt added in the Lab.  Prepared: 1 ThinPrep slide, 1 Cell block, 1 Smear  Fluid Source --  Albumin, Fluid1.2 g/dL  Glucose, [id number] mg/dL  Protein total, Fluid2.5 g/dL  Lacatate Dehydrogenase, [id number] U/L  pH, Fluid8.4  Cytopathology-Non Gyn Report--        RECENT CULTURES:  10-31 @ 12:48 Pleural Fl Pleural Fluid       polymorphonuclear leukocytes seen  No organisms seen  by cytocentrifuge           No growth at 5 days          RESPIRATORY CULTURES:          Studies  Chest X-RAY  CT SCAN Chest   Venous Dopplers: LE:   CT Abdomen  Others        rad< from: Xray Chest 1 View- PORTABLE-Routine (Xray Chest 1 View- PORTABLE-Routine in AM.) (11.04.22 @ 07:24) >    ACC: [id number] EXAM:  XR CHEST PORTABLE ROUTINE 1V                          PROCEDURE DATE:  11/04/2022          INTERPRETATION:  Chest one view    HISTORY: Postthoracentesis    COMPARISON STUDY: 11/3/2022    Frontal expiratory view of the chest shows progression of left chest   whiteout. Right lung shows mild congestive changes. Left cardiac   pacemaker is again noted. There is no evidence of pneumothorax.    IMPRESSION: Left chest whiteout.        Thank you for the courtesy of this referral.    --- End of Report ---            HORACIO HELMS MD; Attending Interventional Radiologist  This document has been electronically signed. Nov 4 2022  3:16PM    < end of copied text >

## 2022-11-05 NOTE — PROGRESS NOTE ADULT - SUBJECTIVE AND OBJECTIVE BOX
CARDIOLOGY FOLLOW UP - Dr. Stephens  Date of Service: 11/5/2022  CC: no events    Review of Systems:  Constitutional: No fever, weight loss, or fatigue  Respiratory: No cough, wheezing, or hemoptysis, no shortness of breath  Cardiovascular: No chest pain, palpitations, passing out, dizziness, or leg swelling  Gastrointestinal: No abd or epigastric pain. No nausea, vomiting, or hematemesis; no diarrhea or consiptaiton, no melena or hematochezia  Vascular: No edema     TELEMETRY:    PHYSICAL EXAM:  T(C): 36.7 (11-05-22 @ 10:24), Max: 36.8 (11-04-22 @ 13:10)  HR: 100 (11-05-22 @ 10:24) (89 - 111)  BP: 104/60 (11-05-22 @ 10:24) (104/60 - 141/60)  RR: 20 (11-05-22 @ 10:24) (16 - 20)  SpO2: 98% (11-05-22 @ 10:24) (96% - 100%)  Wt(kg): --  I&O's Summary    04 Nov 2022 07:01  -  05 Nov 2022 07:00  --------------------------------------------------------  IN: 170 mL / OUT: 1500 mL / NET: -1330 mL        Appearance: Normal	  Cardiovascular: Normal S1 S2,RRR, No JVD, No murmurs  Respiratory: Lungs clear to auscultation	  Gastrointestinal:  Soft, Non-tender, + BS	  Extremities: Normal range of motion, No clubbing, cyanosis or edema  Vascular: Peripheral pulses palpable 2+ bilaterally       Home Medications:  acetaminophen 325 mg oral tablet: 2 tab(s) orally every 6 hours, As needed, Moderate Pain (4 - 6) (23 Sep 2022 16:43)  acetylcysteine 20% inhalation solution: 4 milliliter(s) inhaled 4 times a day as needed (25 Oct 2022 10:37)  acetylcysteine 20% inhalation solution: 4 milliliter(s) inhaled 3 times a day (23 Sep 2022 16:43)  aspirin 81 mg oral delayed release tablet: 1 tab(s) orally once a day (23 Sep 2022 16:43)  Aspirin Enteric Coated 81 mg oral delayed release tablet: 1 tab(s) orally once a day (25 Oct 2022 10:37)  atorvastatin 80 mg oral tablet: 1 tab(s) orally once a day (at bedtime) (23 Sep 2022 16:43)  bacitracin 500 units/g topical ointment: Apply topically to affected area twice to back (25 Oct 2022 10:37)  budesonide 0.5 mg/2 mL inhalation suspension: 0.5 milligram(s) inhaled 2 times a day (14 Sep 2022 19:13)  budesonide 0.5 mg/2 mL inhalation suspension: 2 milliliter(s) inhaled 2 times a day (25 Oct 2022 10:37)  cadexomer iodine 0.9% topical gel: 1 application topically once a day (23 Sep 2022 16:43)  Coumadin 3 mg oral tablet: 1 tab(s) orally once a day (at bedtime) (23 Sep 2022 16:43)  Coumadin 4 mg oral tablet: 1 tab(s) orally once a day (25 Oct 2022 10:37)  finasteride 5 mg oral tablet: 1 tab(s) orally once a day (14 Sep 2022 19:13)  finasteride 5 mg oral tablet: 1 tab(s) orally once a day (25 Oct 2022 10:37)  Flomax 0.4 mg oral capsule: 1 cap(s) orally once a day (25 Oct 2022 10:37)  gabapentin 100 mg oral capsule: 1 tab(s) orally 2 times a day (25 Oct 2022 10:37)  gabapentin 100 mg oral tablet: 1 tab(s) orally 2 times a day (14 Sep 2022 19:13)  guaifenesin-dextromethorphan 100 mg-10 mg/5 mL oral liquid: 10 milliliter(s) orally every 6 hours (23 Sep 2022 16:43)  insulin glargine 100 units/mL subcutaneous solution: 10 unit(s) subcutaneous once a day (at bedtime) (23 Sep 2022 16:43)  insulin lispro 100 units/mL injectable solution: 14 unit(s) injectable once a day before dinner (23 Sep 2022 18:00)  insulin lispro 100 units/mL injectable solution: 14 unit(s) injectable once a day before lunch (23 Sep 2022 18:00)  insulin lispro 100 units/mL injectable solution: 20 unit(s) injectable once a day before Breakfast (23 Sep 2022 18:00)  insulin lispro 100 units/mL subcutaneous solution: 14 unit(s) subcutaneous before lunch and dinner (25 Oct 2022 10:37)  Iodosorb 0.9% topical gel: to heel (25 Oct 2022 10:37)  ipratropium: inhalation as needed (25 Oct 2022 10:37)  ipratropium-albuterol 0.5 mg-2.5 mg/3 mL inhalation solution: 3 milliliter(s) inhaled every 6 hours (23 Sep 2022 16:43)  Lantus 100 units/mL subcutaneous solution: 10 unit(s) subcutaneous once a day (at bedtime) (25 Oct 2022 10:37)  Lasix 20 mg oral tablet: 1 tab(s) orally once a day (25 Oct 2022 10:37)  Lipitor 80 mg oral tablet: 1 tab(s) orally once a day (25 Oct 2022 10:37)  medihoney:  (25 Oct 2022 10:37)  metoprolol tartrate 50 mg oral tablet: 1 tab(s) orally 2 times a day (23 Sep 2022 16:43)  Metoprolol Tartrate 50 mg oral tablet: 1 tab(s) orally 2 times a day (25 Oct 2022 10:37)  montelukast 10 mg oral tablet: 1 tab(s) orally once a day (at bedtime) (23 Sep 2022 16:43)  Multiple Vitamins with Minerals oral tablet: 1 tab(s) orally once a day (23 Sep 2022 16:43)  pantoprazole 40 mg oral delayed release tablet: 1 tab(s) orally once a day (before a meal) (23 Sep 2022 16:43)  polyethylene glycol 3350 oral powder for reconstitution: 17 gram(s) orally once a day (23 Sep 2022 16:43)  predniSONE 50 mg oral tablet: 1 tab(s) orally once a day for one more dose on 9/24/22 then D/C (23 Sep 2022 16:43)  Protonix 40 mg oral delayed release tablet: 1 tab(s) orally once a day (25 Oct 2022 10:37)  senna leaf extract oral tablet: 2 tab(s) orally once a day (at bedtime) (23 Sep 2022 16:43)  Singulair 10 mg oral tablet: 1 tab(s) orally once a day (25 Oct 2022 10:37)  sodium chloride 3% inhalation solution: 4 milliliter(s) inhaled every 12 hours (23 Sep 2022 16:43)  Synthroid 25 mcg (0.025 mg) oral tablet: 1 tab(s) orally once a day (25 Oct 2022 10:37)  Synthroid 25 mcg (0.025 mg) oral tablet: 1 tab(s) orally once a day (14 Sep 2022 19:13)  tamsulosin 0.4 mg oral capsule: 2 cap(s) orally once a day (at bedtime) (14 Sep 2022 19:13)  zinc oxide topical cream: Apply topically to affected area twice to AKA (25 Oct 2022 10:37)        MEDICATIONS  (STANDING):  albuterol/ipratropium for Nebulization 3 milliLiter(s) Nebulizer every 6 hours  aspirin enteric coated 81 milliGRAM(s) Oral daily  atorvastatin 80 milliGRAM(s) Oral at bedtime  BACItracin   Ointment 1 Application(s) Topical daily  buDESOnide    Inhalation Suspension 0.5 milliGRAM(s) Inhalation every 12 hours  collagenase Ointment 1 Application(s) Topical daily  dextrose 5%. 1000 milliLiter(s) (50 mL/Hr) IV Continuous <Continuous>  dextrose 5%. 1000 milliLiter(s) (100 mL/Hr) IV Continuous <Continuous>  dextrose 5%. 1000 milliLiter(s) (60 mL/Hr) IV Continuous <Continuous>  dextrose 50% Injectable 25 Gram(s) IV Push once  dextrose 50% Injectable 25 Gram(s) IV Push once  dextrose 50% Injectable 12.5 Gram(s) IV Push once  dextrose 50% Injectable 25 Gram(s) IV Push once  enoxaparin Injectable 90 milliGRAM(s) SubCutaneous every 12 hours  finasteride 5 milliGRAM(s) Oral daily  furosemide   Injectable 80 milliGRAM(s) IV Push two times a day  glucagon  Injectable 1 milliGRAM(s) IntraMuscular once  insulin glargine Injectable (LANTUS) 7 Unit(s) SubCutaneous at bedtime  insulin lispro (ADMELOG) corrective regimen sliding scale   SubCutaneous three times a day before meals  insulin lispro (ADMELOG) corrective regimen sliding scale   SubCutaneous at bedtime  levothyroxine Injectable 20 MICROGram(s) IV Push at bedtime  metoprolol tartrate 50 milliGRAM(s) Oral two times a day  montelukast 10 milliGRAM(s) Oral daily  pantoprazole  Injectable 40 milliGRAM(s) IV Push every 24 hours  polyethylene glycol 3350 17 Gram(s) Oral every 12 hours  predniSONE   Tablet 20 milliGRAM(s) Oral daily  tamsulosin 0.4 milliGRAM(s) Oral at bedtime        EKG:  RADIOLOGY:  DIAGNOSTIC TESTING:  [ ] Echocardiogram:  [ ] Catherterization:  [ ] Stress Test:  OTHER:     LABS:	 	                          8.3    7.33  )-----------( 169      ( 04 Nov 2022 04:30 )             29.1     11-04    148<H>  |  105  |  27<H>  ----------------------------<  245<H>  3.6   |  35<H>  |  1.11    Ca    8.3<L>      04 Nov 2022 04:30  Phos  2.6     11-04  Mg     1.70     11-04            CARDIAC MARKERS:

## 2022-11-05 NOTE — PROVIDER CONTACT NOTE (OTHER) - REASON
Potassium 3.3
clarify pt has order for hudson removal
Patient with purulent abscess on R AKA incision line
Patient had 3 beats of V-tach
Patient with use of accessory muscles
no sliding scale
Patient with altered mental status
Patient with dyspnea on exertion, use of accessory muscles and lung sounds are congested
No urine output
Patient with 7 beats of vtach
Patient with tachycardia
Patient with +4 edema to L ARM
Patient with altered mental status
Patient with dyspnea
Patient with episode desaturation while turning
As per respiratory, chest PT vest not available at this time
Patient with tachycardia and frequent trigeminy
hypoglycemia event

## 2022-11-05 NOTE — PROVIDER CONTACT NOTE (OTHER) - NAME OF MD/NP/PA/DO NOTIFIED:
DWAYNE Reyes
DWAYNE Reyes
Leisa Peres
DWAYNE Reyes
DWAYNE Russell
Magaly YOUSIF
SAIDA Henry
SAIDA YOUSIF
DWAYNE Reyes
DWAYNE Russell
DWAYNE Russell
Magaly YOUSIF, ACP
Naheed Harvey [id number]
DWAYNE Russell
DWAYNE Reyes
DWAYNE Russell

## 2022-11-05 NOTE — PROVIDER CONTACT NOTE (OTHER) - ACTION/TREATMENT ORDERED:
ACP message page sent
As per PA, ordered chest PT vest, duonebs q6 and high flow NC since patient not complaint with bipap. MD ordered IV steroids
DWAYNE Borges notified, as per PA will f/u
As per PA, obtain EKG and will come to bedside
PA notified, as per PA will come to bedside to assess
ACP made aware
As per PA, continue to monitor O2 on 4L at this moment and wean as tolerated
PA notified, as per PA will come to bedside to assess
ACP Magaly YOUSIF will review,  SAIDA YOUSIF, said that she will d/c order to remove becca
ACP notified . patient blood glucose is 189.  acp aware. patient ate 75% of dinner. will continue to monitor.
As per PA, administer lopressor and continue to monitor
As per PA, continue to monitor at this time
PA made aware. Awaiting for orders.
As per ACP Jaycee, will monitor pt on tele for events and notify ACP if patient becomes symptomatic
As per PA, at this time continue with aggressive manual chest PT until vest becomes available and teach incentive spirometry
PA notified, pending further orders
DWAYNE Borges notified, as per PA will f/u
PA notified, as per PA will come to bedside to assess

## 2022-11-06 NOTE — PROGRESS NOTE ADULT - ASSESSMENT
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

## 2022-11-06 NOTE — PROGRESS NOTE ADULT - ASSESSMENT
TTE with Doppler (w/Cont) (04.03.22 @ 15:07) >  mild aortic stenosis. . Mild left ventricular systolic dysfunction. The inferior wall, and the inferoseptum are hypokinetic.  Echo 9/15/22: .EF 35-40% Severe segmental left ventricular systolic dysfunction. The mid to distal anteroseptum and severely hypokinetic. The mid to basal inferolateral wall is hypokinetic.      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, insulin-dependent diabetes, sys CHF on lasix presents with shortness of breath    #SOB   -cont supplem O2 per pulm  -cont iv lasix , give additional dose as needed  -s/p 1500 cc thoracentesis 10/31/2022  -Pulm/Med/thoracic  f/u  -recent echo with .EF 35-40% Severe segmental left ventricular systolic dysfunction. The mid to distal anteroseptum and severely hypokinetic. The mid to basal inferolateral wall is hypokinetic.  -thoracic surgery f/u    # acute on chronic systolic CHF   -IV lasix as above   -echo with moderate severe lv dysfxn, unchanged from echo  4/2022  -continue medical management of CMP/HF  -ecg, no ischemic changes, HS trop elevated secondary to hypoxia/ chf/ resp infection   -c/w bb     #Pafib, sp PPM   -c/w BB  -off oral ac  -cont AC    #mild as   -echo stable     #Abnl UA  -abx per med      #hypernatremia   -renal f/u    45 minutes spent on total encounter; more than 50% of the visit was spent counseling and/or coordinating care by the attending physician.    poor prognosis  dnr/dni  med f/u

## 2022-11-06 NOTE — PROGRESS NOTE ADULT - NSPROGADDITIONALINFOA_GEN_ALL_CORE
paged acp : awaiting response:    10/28: haley acp and thoracic NP    10/29: paged acp    10/30: dw acp    10/31: haley acp    11/1 : dc acp    11/2: pt Is not doing well : haley HCP:  : they do now want any further intervention: no ct scan chest  : cont chest pt and vest therapy : haley acp  : FOR HOME HOSPICE NEXT WEEK     11/4: haley mai:  the HCP in detail : hold off ct scan chest for now: she will talk to her ssiter:     11/4: haley ac     11/13: pt is doing very poorly:  on 100% NRB  : cont current care  : palliative care following ? for home hospice: haley acp    11/6: now covid positive: ? Mab?

## 2022-11-06 NOTE — PROGRESS NOTE ADULT - SUBJECTIVE AND OBJECTIVE BOX
Date of Service: 11-06-22 @ 14:48    Patient is a 87y old  Male who presents with a chief complaint of Acute hypoxic respiratory failure (06 Nov 2022 10:40)      Any change in ROS: He is on 56 L of oxygen  :  he is alert and awake:  but no resp distress   he does not talk much  : not able to verbalize words:       MEDICATIONS  (STANDING):  albuterol/ipratropium for Nebulization 3 milliLiter(s) Nebulizer every 6 hours  aspirin enteric coated 81 milliGRAM(s) Oral daily  atorvastatin 80 milliGRAM(s) Oral at bedtime  BACItracin   Ointment 1 Application(s) Topical daily  buDESOnide    Inhalation Suspension 0.5 milliGRAM(s) Inhalation every 12 hours  collagenase Ointment 1 Application(s) Topical daily  dextrose 5%. 1000 milliLiter(s) (60 mL/Hr) IV Continuous <Continuous>  dextrose 5%. 1000 milliLiter(s) (100 mL/Hr) IV Continuous <Continuous>  dextrose 5%. 1000 milliLiter(s) (50 mL/Hr) IV Continuous <Continuous>  dextrose 50% Injectable 25 Gram(s) IV Push once  dextrose 50% Injectable 12.5 Gram(s) IV Push once  dextrose 50% Injectable 25 Gram(s) IV Push once  dextrose 50% Injectable 25 Gram(s) IV Push once  enoxaparin Injectable 90 milliGRAM(s) SubCutaneous every 12 hours  finasteride 5 milliGRAM(s) Oral daily  furosemide   Injectable 80 milliGRAM(s) IV Push two times a day  glucagon  Injectable 1 milliGRAM(s) IntraMuscular once  insulin glargine Injectable (LANTUS) 7 Unit(s) SubCutaneous at bedtime  insulin lispro (ADMELOG) corrective regimen sliding scale   SubCutaneous at bedtime  insulin lispro (ADMELOG) corrective regimen sliding scale   SubCutaneous three times a day before meals  levothyroxine Injectable 20 MICROGram(s) IV Push at bedtime  metoprolol tartrate 50 milliGRAM(s) Oral two times a day  montelukast 10 milliGRAM(s) Oral daily  pantoprazole  Injectable 40 milliGRAM(s) IV Push every 24 hours  polyethylene glycol 3350 17 Gram(s) Oral every 12 hours  predniSONE   Tablet 20 milliGRAM(s) Oral daily  tamsulosin 0.4 milliGRAM(s) Oral at bedtime    MEDICATIONS  (PRN):  acetaminophen     Tablet .. 650 milliGRAM(s) Oral every 6 hours PRN Mild Pain (1 - 3), Moderate Pain (4 - 6)  dextrose Oral Gel 15 Gram(s) Oral once PRN Blood Glucose LESS THAN 70 milliGRAM(s)/deciliter    Vital Signs Last 24 Hrs  T(C): 36.8 (06 Nov 2022 11:15), Max: 37.1 (05 Nov 2022 17:51)  T(F): 98.2 (06 Nov 2022 11:15), Max: 98.7 (05 Nov 2022 17:51)  HR: 91 (06 Nov 2022 11:15) (83 - 118)  BP: 135/68 (06 Nov 2022 11:15) (91/53 - 153/75)  BP(mean): --  RR: 20 (06 Nov 2022 11:15) (18 - 20)  SpO2: 92% (06 Nov 2022 11:15) (92% - 100%)    Parameters below as of 06 Nov 2022 11:15  Patient On (Oxygen Delivery Method): nasal cannula w/ humidification  O2 Flow (L/min): 6      I&O's Summary    05 Nov 2022 08:01  -  06 Nov 2022 07:00  --------------------------------------------------------  IN: 1563 mL / OUT: 2750 mL / NET: -1187 mL    06 Nov 2022 07:01  -  06 Nov 2022 14:48  --------------------------------------------------------  IN: 0 mL / OUT: 350 mL / NET: -350 mL          Physical Exam:   GENERAL: NAD, well-groomed, well-developed  HEENT: PETRONA/   Atraumatic, Normocephalic  ENMT: No tonsillar erythema, exudates, or enlargement; Moist mucous membranes, Good dentition, No lesions  NECK: Supple, No JVD, Normal thyroid  CHEST/LUNG: decreased air entry left side  CVS: Regular rate and rhythm; No murmurs, rubs, or gallops  GI: : Soft, Nontender, Nondistended; Bowel sounds present  NERVOUS SYSTEM:  Alert & awake  EXTREMITIES: + edema  LYMPH: No lymphadenopathy noted  SKIN: No rashes or lesions  ENDOCRINOLOGY: No Thyromegaly  PSYCH: calm     Labs:  34                            8.3    7.33  )-----------( 169      ( 04 Nov 2022 04:30 )             29.1                         10.5   4.38  )-----------( 138      ( 03 Nov 2022 06:55 )             38.4     11-04    148<H>  |  105  |  27<H>  ----------------------------<  245<H>  3.6   |  35<H>  |  1.11  11-03    142  |  101  |  19  ----------------------------<  219<H>  4.9   |  30  |  0.95        CAPILLARY BLOOD GLUCOSE      POCT Blood Glucose.: 391 mg/dL (06 Nov 2022 11:27)  POCT Blood Glucose.: 242 mg/dL (06 Nov 2022 07:20)  POCT Blood Glucose.: 320 mg/dL (05 Nov 2022 20:50)  POCT Blood Glucose.: 327 mg/dL (05 Nov 2022 20:15)  POCT Blood Glucose.: 334 mg/dL (05 Nov 2022 17:29)     (10-31 @ 14:43)          Fluid Source --  Albumin, Fluid--  Glucose, Fluid--  Protein total, Fluid--  Lacatate Dehydrogenase, Fluid--  pH, Fluid--  Cytopathology-Non Gyn Report  ACCESSION No:  [id number]  Patient:   LIBRA PEÑA      Accession:                             [id number]    Collected Date/Time:                   10/31/2022 14:43 EDT  Received Date/Time:                    10/31/2022 23:30 EDT    Non-Gynecologic Report - Auth (Verified)    Specimen(s) Submitted    PLEURAL FLUID    Final Diagnosis  PLEURAL FLUID  NEGATIVE FOR MALIGNANT CELLS.    Cytology slides and cell block consist of benign mesothelial cells and    histiocytes.  Screened by: Gabriella GURROLA (ASCP)  Verified by: Rebecca Ledezma MD  (Electronic Signature)  Reported on: 11/02/22 19:47 EDT, [facility], [address], [place] [address]  Phone: [phone]   Fax: [phone]  Cytologytechnical processing performed at [address] [place] [address], [place] [address]  _________________________________________________________________      Clinical Information  Pleural effusion; CHF.    Gross Description  Received: 20 ml of unfixed dark-yellow fluid. CytoLyt added in the Lab.  Prepared: 1 ThinPrep slide, 1 Cell block, 1 Smear  Fluid Source --  Albumin, Fluid1.2 g/dL  Glucose, [id number] mg/dL  Protein total, Fluid2.5 g/dL  Lacatate Dehydrogenase, [id number] U/L  pH, Fluid8.4  Cytopathology-Non Gyn Report--        RECENT CULTURES:  10-31 @ 12:48 Pleural Fl Pleural Fluid       polymorphonuclear leukocytes seen  No organisms seen  by cytocentrifuge           No growth at 5 days          RESPIRATORY CULTURES:        rad< from: Xray Chest 1 View- PORTABLE-Routine (Xray Chest 1 View- PORTABLE-Routine in AM.) (11.05.22 @ 08:56) >    ACC: [id number] EXAM:  XR CHEST PORTABLE ROUTINE 1V                          PROCEDURE DATE:  11/05/2022          INTERPRETATION:  CLINICAL INDICATION: follow-up    EXAM:  Frontal views of the chest from 11/5/2022 at 0744. Compared to prior   study from 11/4/2022.    Projection lordotic.  Rotated left.    IMPRESSION:  Redemonstrated complete left hemithorax opacification.    Slight right basilar haziness could be due to overlying soft tissues   versus small layering pleural effusion. Right lung markings again appear   slightly prominent and indistinct which may reflect degree of edema. No   pneumothorax.    Left chest wall dual-lead pacemaker again noted. Cardiac and mediastinal   silhouettes otherwise mostly obscured by the left hemithorax   opacification.    --- End of Report ---            JORDAN CORTEZ MD; Attending Radiologist  This document has been electronically signed. Nov 5 2022  2:35PM    < end of copied text >    Studies  Chest X-RAY  CT SCAN Chest   Venous Dopplers: LE:   CT Abdomen  Others

## 2022-11-06 NOTE — CHART NOTE - NSCHARTNOTEFT_GEN_A_CORE
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 Cardiology and Pulmonology consulted s/p thoracentesis. Course c/b hypernatremia and left foot wounds.  s/p RRT for increased work of breathing, hypoxia, placed on bipap but refusing, seen by pulm, chest vest ordered. steroids started empirically. O2 titrated from HFNC to now 4-6 l nC. Palliative consulted and following, now DNR/DNI. Family requesting to take patient home on [facility] once he is better optimized.    Covid + 11/6/22. Patient is in no distress. O2 sat 92-94% on 6l nC. RR-20. Sitting up in bed visiting with family. Discussed findings with daughter Willow, HCP, along with wife and sister Cynthia. Family has decided not to treat Covid infection. Updated Pulmonologist Dr Felton, and Attending Dr Keenan.

## 2022-11-06 NOTE — PROGRESS NOTE ADULT - ASSESSMENT
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

## 2022-11-06 NOTE — PROGRESS NOTE ADULT - PROBLEM SELECTOR PLAN 1
start remdesivir and dexa  if MAB cant be given:  and if it is in the line of GOC with the family, and yesterday they want  want any aggressive intervention hence ct rivera chest was not done: He is already on full  anticoagulation;

## 2022-11-06 NOTE — PROGRESS NOTE ADULT - PROBLEM SELECTOR PLAN 1
- 2/2 large pleural effusion and pulm vasc congestion  - troponin elevation likely 2/2 CHF exacerbation (stable x 3 sets). card following   - TTE: Severe global left ventricular systolic dysfunction. EF 28%  - tele monitoring: no events. paced rhythm.   - c/w Lasix 40 mg iv BID, has chronic Adair for strict ins and outs.  - pulm consult: Dr. Felton.   - CT surgery on the case for thoracentesis (INR 2.5, procedure canceled)  - vit K PO x 1 for elevated INR in anticipation for thoracentesis, which was done 10/31/22: s/p 1500 cc fluid removed.   - Lovenox 90 mg SQ BID (weight based) bridge, restarted post procedure.   - will not restart coumadin yet until decision on CT chest + PleurX made by family

## 2022-11-06 NOTE — PROGRESS NOTE ADULT - SUBJECTIVE AND OBJECTIVE BOX
Overnight events noted by PA  On 6L humidified NC O2    Vital Signs Last 24 Hrs  T(C): 36.4 (06 Nov 2022 05:30), Max: 37.1 (05 Nov 2022 17:51)  T(F): 97.6 (06 Nov 2022 05:30), Max: 98.7 (05 Nov 2022 17:51)  HR: 89 (06 Nov 2022 05:30) (83 - 118)  BP: 120/67 (06 Nov 2022 05:30) (91/53 - 153/75)  BP(mean): --  RR: 20 (06 Nov 2022 05:30) (18 - 20)  SpO2: 94% (06 Nov 2022 05:30) (92% - 100%)    I&O's Summary    11-05-22 @ 08:01  -  11-06-22 @ 07:00  --------------------------------------------------------  IN: 1563 mL / OUT: 2750 mL / NET: -1187 mL    11-06-22 @ 07:01  -  11-06-22 @ 10:40  --------------------------------------------------------  IN: 0 mL / OUT: 200 mL / NET: -200 mL        PHYSICAL EXAM:  GENERAL: lethargic  HEAD:  Atraumatic, Normocephalic  EYES: EOMI, PERRLA, conjunctiva and sclera clear  NECK: Supple, No JVD  CHEST/LUNG: mild decrease breath sounds bilaterally; No wheeze   HEART: Irregular rate and rhythm; No murmurs, rubs, or gallops  ABDOMEN: Soft, Nontender, Nondistended; Bowel sounds present  : chronic Adair in place, britney color urine, neg CVAT   Neuro: AAOx1, no focal weakness, mild left UE weakness baseline   EXTREMITIES:  + Peripheral Pulses, No clubbing, cyanosis, + edema, right AKA, left foot dressing d/c/i, + arm edema    LABS:            CAPILLARY BLOOD GLUCOSE      POCT Blood Glucose.: 242 mg/dL (06 Nov 2022 07:20)  POCT Blood Glucose.: 320 mg/dL (05 Nov 2022 20:50)  POCT Blood Glucose.: 327 mg/dL (05 Nov 2022 20:15)  POCT Blood Glucose.: 334 mg/dL (05 Nov 2022 17:29)  POCT Blood Glucose.: 328 mg/dL (05 Nov 2022 12:27)            RADIOLOGY & ADDITIONAL TESTS:    Imaging Personally Reviewed:  [x] YES  [ ] NO    Will obtain old records:  [ ] YES  [x] NO

## 2022-11-07 NOTE — DISCHARGE NOTE NURSING/CASE MANAGEMENT/SOCIAL WORK - NSDCVIVACCINE_GEN_ALL_CORE_FT
influenza, injectable, quadrivalent, preservative free; 11-Sep-2019 13:29; Jerry Modi (ORACIO); Hubub; [id number] (Exp. Date: 30-Jun-2020); IntraMuscular; Deltoid Left.; 0.5 milliLiter(s); VIS (VIS Published: 15-Aug-2019, VIS Presented: 11-Sep-2019);

## 2022-11-07 NOTE — PROGRESS NOTE ADULT - ASSESSMENT
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 Renal following for Hypernatremia Mx    Hypernatremia 2/2 free water deficit - resolving  Na 152  rising BUN and Bicarb  likely due to overdiuresis,  consider decreasing Lasix to 40IV QD or holding Lasix at this time  Hyperglycemic at this time  once glucose under control would start D5W @ 60cc/hr  family might pick possible hospice, will defer to pal care in that case  BMP qdaily    Acute respiratory failure with hypoxia 2/2 large pleural effusion and pulm vasc congestion  -CMP - TTE: Severe global left ventricular systolic dysfunction. 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 Renal following for Hypernatremia Mx    Hypernatremia 2/2 free water deficit - resolving  Na 152  rising BUN and Bicarb  likely due to overdiuresis,  consider decreasing Lasix to 40IV QD or holding Lasix at this time  Hyperglycemic at this time  once glucose under control would start D5W @ 60cc/hr  family might pick possible hospice, will defer to pal care in that case  BMP qdaily    Acute respiratory failure with hypoxia 2/2 large pleural effusion and pulm vasc congestion  -CMP - TTE: Severe global left ventricular systolic dysfunction. EF 28%  -tele monitoring  - Lasix 40 mg iv BID, now resumed  -bipap 14/7 w/ fio2 60%  - f/u pulm   - f/u CT surgery eval for thoracentesis  optimized renal stand point for procedure/OR    Hypokalemia  Supplemented      For any question, call:  Cell # [phone]  Pager # [phone]  Callback # [phone]

## 2022-11-07 NOTE — PROGRESS NOTE ADULT - SUBJECTIVE AND OBJECTIVE BOX
Date of Service: 11-07-22 @ 13:19    Patient is a 87y old  Male who presents with a chief complaint of Acute hypoxic respiratory failure (07 Nov 2022 12:59)      Any change in ROS: pt is doing poorly: on 5 L today  ;  \family had refused for covid rx:        MEDICATIONS  (STANDING):  ALBUTerol    90 MICROgram(s) HFA Inhaler 2 Puff(s) Inhalation every 6 hours  aspirin enteric coated 81 milliGRAM(s) Oral daily  atorvastatin 80 milliGRAM(s) Oral at bedtime  BACItracin   Ointment 1 Application(s) Topical daily  buDESOnide    Inhalation Suspension 0.5 milliGRAM(s) Inhalation every 12 hours  collagenase Ointment 1 Application(s) Topical daily  dextrose 5%. 1000 milliLiter(s) (100 mL/Hr) IV Continuous <Continuous>  dextrose 5%. 1000 milliLiter(s) (50 mL/Hr) IV Continuous <Continuous>  dextrose 5%. 1000 milliLiter(s) (60 mL/Hr) IV Continuous <Continuous>  dextrose 50% Injectable 25 Gram(s) IV Push once  dextrose 50% Injectable 25 Gram(s) IV Push once  dextrose 50% Injectable 12.5 Gram(s) IV Push once  dextrose 50% Injectable 25 Gram(s) IV Push once  enoxaparin Injectable 90 milliGRAM(s) SubCutaneous every 12 hours  finasteride 5 milliGRAM(s) Oral daily  glucagon  Injectable 1 milliGRAM(s) IntraMuscular once  insulin glargine Injectable (LANTUS) 7 Unit(s) SubCutaneous at bedtime  insulin lispro (ADMELOG) corrective regimen sliding scale   SubCutaneous three times a day before meals  insulin lispro (ADMELOG) corrective regimen sliding scale   SubCutaneous at bedtime  levothyroxine Injectable 20 MICROGram(s) IV Push at bedtime  metoprolol tartrate 50 milliGRAM(s) Oral two times a day  montelukast 10 milliGRAM(s) Oral daily  pantoprazole  Injectable 40 milliGRAM(s) IV Push every 24 hours  polyethylene glycol 3350 17 Gram(s) Oral every 12 hours  potassium phosphate / sodium phosphate Powder (PHOS-NaK) 1 Packet(s) Oral two times a day  tamsulosin 0.4 milliGRAM(s) Oral at bedtime  tiotropium 18 MICROgram(s) Capsule 1 Capsule(s) Inhalation daily    MEDICATIONS  (PRN):  acetaminophen     Tablet .. 650 milliGRAM(s) Oral every 6 hours PRN Mild Pain (1 - 3), Moderate Pain (4 - 6)  dextrose Oral Gel 15 Gram(s) Oral once PRN Blood Glucose LESS THAN 70 milliGRAM(s)/deciliter    Vital Signs Last 24 Hrs  T(C): 37 (07 Nov 2022 12:22), Max: 37 (07 Nov 2022 12:22)  T(F): 98.6 (07 Nov 2022 12:22), Max: 98.6 (07 Nov 2022 12:22)  HR: 98 (07 Nov 2022 12:22) (87 - 98)  BP: 134/54 (07 Nov 2022 12:22) (125/60 - 155/89)  BP(mean): --  RR: 21 (07 Nov 2022 12:22) (20 - 21)  SpO2: 98% (07 Nov 2022 12:22) (92% - 99%)    Parameters below as of 07 Nov 2022 12:22  Patient On (Oxygen Delivery Method): nasal cannula w/ humidification  O2 Flow (L/min): 5      I&O's Summary    06 Nov 2022 07:01  -  07 Nov 2022 07:00  --------------------------------------------------------  IN: 0 mL / OUT: 1350 mL / NET: -1350 mL          Physical Exam:   GENERAL: NAD, well-groomed, well-developed  HEENT: PETRONA/   Atraumatic, Normocephalic  ENMT: No tonsillar erythema, exudates, or enlargement; Moist mucous membranes, Good dentition, No lesions  NECK: Supple, No JVD, Normal thyroid  CHEST/LUNG: Clear to auscultaion, ; No rales, rhonchi, wheezing, or rubs  CVS: Regular rate and rhythm; No murmurs, rubs, or gallops  GI: : Soft, Nontender, Nondistended; Bowel sounds present  NERVOUS SYSTEM:  lethargic   EXTREMITIES:+ edema  LYMPH: No lymphadenopathy noted  SKIN: No rashes or lesions  ENDOCRINOLOGY: No Thyromegaly  PSYCH: calm     Labs:  34                            8.7    8.66  )-----------( 193      ( 07 Nov 2022 07:01 )             31.3                         8.3    7.33  )-----------( 169      ( 04 Nov 2022 04:30 )             29.1     11-07    152<H>  |  100  |  29<H>  ----------------------------<  209<H>  3.1<L>   |  37<H>  |  0.92  11-04    148<H>  |  105  |  27<H>  ----------------------------<  245<H>  3.6   |  35<H>  |  1.11    Ca    8.6      07 Nov 2022 07:01  Phos  2.0     11-07  Mg     1.50     11-07      CAPILLARY BLOOD GLUCOSE      POCT Blood Glucose.: 320 mg/dL (07 Nov 2022 11:21)  POCT Blood Glucose.: 338 mg/dL (07 Nov 2022 08:27)  POCT Blood Glucose.: 353 mg/dL (06 Nov 2022 20:59)  POCT Blood Glucose.: 347 mg/dL (06 Nov 2022 16:50)     (10-31 @ 14:43)          Fluid Source --  Albumin, Fluid--  Glucose, Fluid--  Protein total, Fluid--  Lacatate Dehydrogenase, Fluid--  pH, Fluid--  Cytopathology-Non Gyn Report  ACCESSION No:  [id number]  Patient:   LIBRA PEÑA      Accession:                             [id number]    Collected Date/Time:                   10/31/2022 14:43 EDT  Received Date/Time:                    10/31/2022 23:30 EDT    Non-Gynecologic Report - Auth (Verified)    Specimen(s) Submitted    PLEURAL FLUID    Final Diagnosis  PLEURAL FLUID  NEGATIVE FOR MALIGNANT CELLS.    Cytology slides and cell block consist of benign mesothelial cells and    histiocytes.  Screened by: Gabriella GURROLA (ASCP)  Verified by: Rebecca Ledezma MD  (Electronic Signature)  Reported on: 11/02/22 19:47 EDT, [facility], [address] [place] [address]  Phone: [phone]   Fax: [phone]  Cytologytechnical processing performed at [address] [place] [address], [place] [address]  _________________________________________________________________      Clinical Information  Pleural effusion; CHF.    Gross Description  Received: 20 ml of unfixed dark-yellow fluid. CytoLyt added in the Lab.  Prepared: 1 ThinPrep slide, 1 Cell block, 1 Smear        RECENT CULTURES:        RESPIRATORY CULTURES:          Studies  Chest X-RAY  CT SCAN Chest   Venous Dopplers: LE:   CT Abdomen  Others      rad< from: Xray Chest 1 View- PORTABLE-Routine (Xray Chest 1 View- PORTABLE-Routine in AM.) (11.05.22 @ 08:56) >  ACC: [id number] EXAM:  XR CHEST PORTABLE ROUTINE 1V                          PROCEDURE DATE:  11/05/2022          INTERPRETATION:  CLINICAL INDICATION: follow-up    EXAM:  Frontal views of the chest from 11/5/2022 at 0744. Compared to prior   study from 11/4/2022.    Projection lordotic.  Rotated left.    IMPRESSION:  Redemonstrated complete left hemithorax opacification.    Slight right basilar haziness could be due to overlying soft tissues   versus small layering pleural effusion. Right lung markings again appear   slightly prominent and indistinct which may reflect degree of edema. No   pneumothorax.    Left chest wall dual-lead pacemaker again noted. Cardiac and mediastinal   silhouettes otherwise mostly obscured by the left hemithorax   opacification.    --- End of Report ---            JORDAN CORTEZ MD; Attending Radiologist  This document has been electronically signed. Nov 5 2022  2:35PM    < end of copied text >  < from: Xray Chest 1 View- PORTABLE-Routine (Xray Chest 1 View- PORTABLE-Routine in AM.) (11.04.22 @ 07:24) >    ACC: [id number] EXAM:  XR CHEST PORTABLE ROUTINE 1V                          PROCEDURE DATE:  11/04/2022          INTERPRETATION:  Chest one view    HISTORY: Postthoracentesis    COMPARISON STUDY: 11/3/2022    Frontal expiratory view of the chest shows progression of left chest   whiteout. Right lung shows mild congestive changes. Left cardiac   pacemaker is again noted. There is no evidence of pneumothorax.    IMPRESSION: Left chest whiteout.        Thank you for the courtesy of this referral.    --- End of Report ---            HORACIO HELMS MD; Attending Interventional Radiologist  This document has been electronically signed. Nov 4 2022  3:16PM    < end of copied text >

## 2022-11-07 NOTE — PROGRESS NOTE ADULT - PROBLEM SELECTOR PLAN 1
- 2/2 large pleural effusion and pulm vasc congestion  - troponin elevation likely 2/2 CHF exacerbation (stable x 3 sets). card following   - TTE: Severe global left ventricular systolic dysfunction. EF 28%  - tele monitoring: no events. paced rhythm.   - c/w Lasix 40 mg iv BID, has chronic Adair for strict ins and outs.  - pulm consult: Dr. Felton.   - CT surgery on the case for thoracentesis (INR 2.5, procedure canceled)  - vit K PO x 1 for elevated INR in anticipation for thoracentesis, which was done 10/31/22: s/p 1500 cc fluid removed.   - no plans for further imaging or invasive procedures  - poor prognosis, comfort care only  - Advance directives discussed with the daughter Willow HCP requests palliative eval. Consulted.   - The daughter Willow asking for DNR/DNI. wishes that he will be going home with home hospice once he is a little better optimized. d/w palliative team Delmy.  - Low-dose IV MSO4 as need for relieving work of breathing is appropriate

## 2022-11-07 NOTE — PROGRESS NOTE ADULT - SUBJECTIVE AND OBJECTIVE BOX
On 5L humidified NCO2    Vital Signs Last 24 Hrs  T(C): 37 (07 Nov 2022 12:22), Max: 37 (07 Nov 2022 12:22)  T(F): 98.6 (07 Nov 2022 12:22), Max: 98.6 (07 Nov 2022 12:22)  HR: 98 (07 Nov 2022 12:22) (87 - 98)  BP: 134/54 (07 Nov 2022 12:22) (125/60 - 155/89)  BP(mean): --  RR: 21 (07 Nov 2022 12:22) (20 - 21)  SpO2: 98% (07 Nov 2022 12:22) (92% - 99%)    I&O's Summary    11-06-22 @ 07:01  -  11-07-22 @ 07:00  --------------------------------------------------------  IN: 0 mL / OUT: 1350 mL / NET: -1350 mL        PHYSICAL EXAM:  GENERAL: lethargic  HEAD:  Atraumatic, Normocephalic  EYES: EOMI, PERRLA, conjunctiva and sclera clear  NECK: Supple, No JVD  CHEST/LUNG: mild decrease breath sounds bilaterally; No wheeze   HEART: Irregular rate and rhythm; No murmurs, rubs, or gallops  ABDOMEN: Soft, Nontender, Nondistended; Bowel sounds present  : chronic [place] in place, britney color urine, neg CVAT   Neuro: AAOx1, no focal weakness, mild left UE weakness baseline   EXTREMITIES:  + Peripheral Pulses, No clubbing, cyanosis, + edema, right AKA, left foot dressing d/c/i, + arm edema      LABS:                        8.7    8.66  )-----------( 193      ( 07 Nov 2022 07:01 )             31.3     11-07    152<H>  |  100  |  29<H>  ----------------------------<  209<H>  3.1<L>   |  37<H>  |  0.92    Ca    8.6      07 Nov 2022 07:01  Phos  2.0     11-07  Mg     1.50     11-07        CAPILLARY BLOOD GLUCOSE      POCT Blood Glucose.: 320 mg/dL (07 Nov 2022 11:21)  POCT Blood Glucose.: 338 mg/dL (07 Nov 2022 08:27)  POCT Blood Glucose.: 353 mg/dL (06 Nov 2022 20:59)  POCT Blood Glucose.: 347 mg/dL (06 Nov 2022 16:50)            RADIOLOGY & ADDITIONAL TESTS:    Imaging Personally Reviewed:  [x] YES  [ ] NO    Will obtain old records:  [ ] YES  [x] NO

## 2022-11-07 NOTE — PROGRESS NOTE ADULT - PROBLEM SELECTOR PLAN 1
start remdesivir and dexa  if MAB cant be given:  and if it is in the line of GOC with the family, and yesterday they want  want any aggressive intervention hence ct rivera chest was not done: He is already on full  anticoagulation;    11/7 pt is doing very poorly:   now GOC is comfort care:   quality  of lief is very poor   family has refused fro rx for covid:   now for home hospice

## 2022-11-07 NOTE — DISCHARGE NOTE NURSING/CASE MANAGEMENT/SOCIAL WORK - NSDCFUADDAPPT_GEN_ALL_CORE_FT
Podiatry Discharge Instructions:  Follow up: Please follow up with Dr. Joseph Waterhouse within 1 week of discharge from the hospital, please call [phone] for appointment and discuss that you recently were seen in the hospital.  Wound Care: Please apply santyl to left foot wounds followed by sterile 4x4 gauze abd pad and kerlex, 3x per week  Weight bearing: Please off-load left leg and foot with blue ZLFOW boot at all times

## 2022-11-07 NOTE — DISCHARGE NOTE NURSING/CASE MANAGEMENT/SOCIAL WORK - NSDCPEFALRISK_GEN_ALL_CORE
For information on Fall & Injury Prevention, visit: [email or web address] OR  [email or web address] OR  https://www.cdc.gov/steadi/patient.html

## 2022-11-07 NOTE — DISCHARGE NOTE NURSING/CASE MANAGEMENT/SOCIAL WORK - NSDCPNINST_GEN_ALL_CORE
Wound Care:   - Left arm: cleanse with normal saline, pat dry.  Apply Liquid barrier film to periwound skin. Place ABD pad over areas of weeping edema, secure with kerlix and paper tape. Elevate left arm.   - Cervical Spine: cleanse with NS, apply liquid barrier film to periwound skin.  Apply aquacel AG hydrofiber to base of wound, secure with foam with border.   Perineal areas, buttocks, groin: cleanse with skin cleanser, pat dry.  Apply nikhil antifungal twice per day or PRN with incontinence care.   Abdominal pannus: cleanse with skin cleanser, pat dry.  Place interdry textile sheeting under folds leaving 2 inches exposed at ends to wick, remove to wash and dry affected area, then replace.  May stay in place up to 5 days or until soiled.   Right BKA: Cleanse with NS, pat dry. Apply Liquid barrier film to periwound skin (allow to dry). Paint with iodine, allow to dry. Secure with abdominal pad, kerlix wrap and paper tape. Change daily and PRN if soiled. Monitor for tissue type changes  Left heel: apply santyl to posterior heel and lateral 5th toe wound and dress with 4x4 gauze and kerlex.

## 2022-11-07 NOTE — PROGRESS NOTE ADULT - SUBJECTIVE AND OBJECTIVE BOX
CARDIOLOGY FOLLOW UP - Dr. Stephens  Date of Service: 11/7/2022  CC: no acute events    Review of Systems:  Constitutional: No fever, weight loss, or fatigue  Respiratory: No cough, wheezing, or hemoptysis, no shortness of breath  Cardiovascular: No chest pain, palpitations, passing out, dizziness, or leg swelling  Gastrointestinal: No abd or epigastric pain. No nausea, vomiting, or hematemesis; no diarrhea or consiptaiton, no melena or hematochezia  Vascular: No edema     TELEMETRY:    PHYSICAL EXAM:  T(C): 37 (11-07-22 @ 12:22), Max: 37 (11-07-22 @ 12:22)  HR: 98 (11-07-22 @ 12:22) (87 - 98)  BP: 134/54 (11-07-22 @ 12:22) (125/60 - 155/89)  RR: 21 (11-07-22 @ 12:22) (20 - 21)  SpO2: 98% (11-07-22 @ 12:22) (92% - 99%)  Wt(kg): --  I&O's Summary    06 Nov 2022 07:01  -  07 Nov 2022 07:00  --------------------------------------------------------  IN: 0 mL / OUT: 1350 mL / NET: -1350 mL        Appearance: Normal	  Cardiovascular: Normal S1 S2,RRR, No JVD, No murmurs  Respiratory: Lungs clear to auscultation	  Gastrointestinal:  Soft, Non-tender, + BS	  Extremities: Normal range of motion, No clubbing, cyanosis or edema  Vascular: Peripheral pulses palpable 2+ bilaterally       Home Medications:  acetaminophen 325 mg oral tablet: 2 tab(s) orally every 6 hours, As needed, Moderate Pain (4 - 6) (23 Sep 2022 16:43)  acetylcysteine 20% inhalation solution: 4 milliliter(s) inhaled 4 times a day as needed (25 Oct 2022 10:37)  acetylcysteine 20% inhalation solution: 4 milliliter(s) inhaled 3 times a day (23 Sep 2022 16:43)  aspirin 81 mg oral delayed release tablet: 1 tab(s) orally once a day (23 Sep 2022 16:43)  Aspirin Enteric Coated 81 mg oral delayed release tablet: 1 tab(s) orally once a day (25 Oct 2022 10:37)  atorvastatin 80 mg oral tablet: 1 tab(s) orally once a day (at bedtime) (23 Sep 2022 16:43)  bacitracin 500 units/g topical ointment: Apply topically to affected area twice to back (25 Oct 2022 10:37)  budesonide 0.5 mg/2 mL inhalation suspension: 0.5 milligram(s) inhaled 2 times a day (14 Sep 2022 19:13)  budesonide 0.5 mg/2 mL inhalation suspension: 2 milliliter(s) inhaled 2 times a day (25 Oct 2022 10:37)  cadexomer iodine 0.9% topical gel: 1 application topically once a day (23 Sep 2022 16:43)  Coumadin 3 mg oral tablet: 1 tab(s) orally once a day (at bedtime) (23 Sep 2022 16:43)  Coumadin 4 mg oral tablet: 1 tab(s) orally once a day (25 Oct 2022 10:37)  finasteride 5 mg oral tablet: 1 tab(s) orally once a day (14 Sep 2022 19:13)  finasteride 5 mg oral tablet: 1 tab(s) orally once a day (25 Oct 2022 10:37)  Flomax 0.4 mg oral capsule: 1 cap(s) orally once a day (25 Oct 2022 10:37)  gabapentin 100 mg oral capsule: 1 tab(s) orally 2 times a day (25 Oct 2022 10:37)  gabapentin 100 mg oral tablet: 1 tab(s) orally 2 times a day (14 Sep 2022 19:13)  guaifenesin-dextromethorphan 100 mg-10 mg/5 mL oral liquid: 10 milliliter(s) orally every 6 hours (23 Sep 2022 16:43)  insulin glargine 100 units/mL subcutaneous solution: 10 unit(s) subcutaneous once a day (at bedtime) (23 Sep 2022 16:43)  insulin lispro 100 units/mL injectable solution: 14 unit(s) injectable once a day before dinner (23 Sep 2022 18:00)  insulin lispro 100 units/mL injectable solution: 14 unit(s) injectable once a day before lunch (23 Sep 2022 18:00)  insulin lispro 100 units/mL injectable solution: 20 unit(s) injectable once a day before Breakfast (23 Sep 2022 18:00)  insulin lispro 100 units/mL subcutaneous solution: 14 unit(s) subcutaneous before lunch and dinner (25 Oct 2022 10:37)  Iodosorb 0.9% topical gel: to heel (25 Oct 2022 10:37)  ipratropium: inhalation as needed (25 Oct 2022 10:37)  ipratropium-albuterol 0.5 mg-2.5 mg/3 mL inhalation solution: 3 milliliter(s) inhaled every 6 hours (23 Sep 2022 16:43)  Lantus 100 units/mL subcutaneous solution: 10 unit(s) subcutaneous once a day (at bedtime) (25 Oct 2022 10:37)  Lasix 20 mg oral tablet: 1 tab(s) orally once a day (25 Oct 2022 10:37)  Lipitor 80 mg oral tablet: 1 tab(s) orally once a day (25 Oct 2022 10:37)  medihoney:  (25 Oct 2022 10:37)  metoprolol tartrate 50 mg oral tablet: 1 tab(s) orally 2 times a day (23 Sep 2022 16:43)  Metoprolol Tartrate 50 mg oral tablet: 1 tab(s) orally 2 times a day (25 Oct 2022 10:37)  montelukast 10 mg oral tablet: 1 tab(s) orally once a day (at bedtime) (23 Sep 2022 16:43)  Multiple Vitamins with Minerals oral tablet: 1 tab(s) orally once a day (23 Sep 2022 16:43)  pantoprazole 40 mg oral delayed release tablet: 1 tab(s) orally once a day (before a meal) (23 Sep 2022 16:43)  polyethylene glycol 3350 oral powder for reconstitution: 17 gram(s) orally once a day (23 Sep 2022 16:43)  predniSONE 50 mg oral tablet: 1 tab(s) orally once a day for one more dose on 9/24/22 then D/C (23 Sep 2022 16:43)  Protonix 40 mg oral delayed release tablet: 1 tab(s) orally once a day (25 Oct 2022 10:37)  senna leaf extract oral tablet: 2 tab(s) orally once a day (at bedtime) (23 Sep 2022 16:43)  Singulair 10 mg oral tablet: 1 tab(s) orally once a day (25 Oct 2022 10:37)  sodium chloride 3% inhalation solution: 4 milliliter(s) inhaled every 12 hours (23 Sep 2022 16:43)  Synthroid 25 mcg (0.025 mg) oral tablet: 1 tab(s) orally once a day (25 Oct 2022 10:37)  Synthroid 25 mcg (0.025 mg) oral tablet: 1 tab(s) orally once a day (14 Sep 2022 19:13)  tamsulosin 0.4 mg oral capsule: 2 cap(s) orally once a day (at bedtime) (14 Sep 2022 19:13)  zinc oxide topical cream: Apply topically to affected area twice to AKA (25 Oct 2022 10:37)        MEDICATIONS  (STANDING):  ALBUTerol    90 MICROgram(s) HFA Inhaler 2 Puff(s) Inhalation every 6 hours  aspirin enteric coated 81 milliGRAM(s) Oral daily  atorvastatin 80 milliGRAM(s) Oral at bedtime  BACItracin   Ointment 1 Application(s) Topical daily  buDESOnide    Inhalation Suspension 0.5 milliGRAM(s) Inhalation every 12 hours  collagenase Ointment 1 Application(s) Topical daily  dextrose 5%. 1000 milliLiter(s) (50 mL/Hr) IV Continuous <Continuous>  dextrose 5%. 1000 milliLiter(s) (100 mL/Hr) IV Continuous <Continuous>  dextrose 5%. 1000 milliLiter(s) (60 mL/Hr) IV Continuous <Continuous>  dextrose 50% Injectable 25 Gram(s) IV Push once  dextrose 50% Injectable 25 Gram(s) IV Push once  dextrose 50% Injectable 12.5 Gram(s) IV Push once  dextrose 50% Injectable 25 Gram(s) IV Push once  enoxaparin Injectable 90 milliGRAM(s) SubCutaneous every 12 hours  finasteride 5 milliGRAM(s) Oral daily  glucagon  Injectable 1 milliGRAM(s) IntraMuscular once  insulin glargine Injectable (LANTUS) 7 Unit(s) SubCutaneous at bedtime  insulin lispro (ADMELOG) corrective regimen sliding scale   SubCutaneous three times a day before meals  insulin lispro (ADMELOG) corrective regimen sliding scale   SubCutaneous at bedtime  levothyroxine Injectable 20 MICROGram(s) IV Push at bedtime  metoprolol tartrate 50 milliGRAM(s) Oral two times a day  montelukast 10 milliGRAM(s) Oral daily  pantoprazole  Injectable 40 milliGRAM(s) IV Push every 24 hours  polyethylene glycol 3350 17 Gram(s) Oral every 12 hours  potassium phosphate / sodium phosphate Powder (PHOS-NaK) 1 Packet(s) Oral two times a day  tamsulosin 0.4 milliGRAM(s) Oral at bedtime  tiotropium 18 MICROgram(s) Capsule 1 Capsule(s) Inhalation daily        EKG:  RADIOLOGY:  DIAGNOSTIC TESTING:  [ ] Echocardiogram:  [ ] Catherterization:  [ ] Stress Test:  OTHER:     LABS:	 	                          8.7    8.66  )-----------( 193      ( 07 Nov 2022 07:01 )             31.3     11-07    152<H>  |  100  |  29<H>  ----------------------------<  209<H>  3.1<L>   |  37<H>  |  0.92    Ca    8.6      07 Nov 2022 07:01  Phos  2.0     11-07  Mg     1.50     11-07            CARDIAC MARKERS:

## 2022-11-07 NOTE — DISCHARGE NOTE NURSING/CASE MANAGEMENT/SOCIAL WORK - PATIENT PORTAL LINK FT
You can access the FollowMyHealth Patient Portal offered by [facility] by registering at the following website: [email or web address]. By joining [employer]’s FollowMyHealth portal, you will also be able to view your health information using other applications (apps) compatible with our system.

## 2022-11-07 NOTE — PROGRESS NOTE ADULT - ASSESSMENT
TTE with Doppler (w/Cont) (04.03.22 @ 15:07) >  mild aortic stenosis. . Mild left ventricular systolic dysfunction. The inferior wall, and the inferoseptum are hypokinetic.  Echo 9/15/22: .EF 35-40% Severe segmental left ventricular systolic dysfunction. The mid to distal anteroseptum and severely hypokinetic. The mid to basal inferolateral wall is hypokinetic.      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 The mid to distal anteroseptum and severely hypokinetic. The mid to basal inferolateral wall is hypokinetic.  -thoracic surgery f/u    # acute on chronic systolic CHF   -lasix held for hypernatremia  -echo with moderate severe lv dysfxn, unchanged from echo  4/2022  -continue medical management of CMP/HF  -ecg, no ischemic changes, HS trop elevated secondary to hypoxia/ chf/ resp infection   -c/w bb     #Pafib, sp PPM   -c/w BB  -off oral ac  -cont AC    #mild as   -echo stable     #Abnl UA  -abx per med      #hypernatremia   -renal f/u  -hold alsix    45 minutes spent on total encounter; more than 50% of the visit was spent counseling and/or coordinating care by the attending physician.    poor prognosis  dnr/dni  med f/u

## 2022-11-07 NOTE — PROGRESS NOTE ADULT - SUBJECTIVE AND OBJECTIVE BOX
[facility] - ORESTES Wall / ORESTES Wynne / KARIE Melendez/ ORESTES Knight/ ORESTES Jang/ ADRIA Lee / TOÑITO Mora / ROBB Kapoor  ---------------------------------------------------------------------------------------------------------------  seen and examined today for   Interval :  VITALS:  T(F): 98.5 (11-07-22 @ 06:04), Max: 98.5 (11-07-22 @ 06:04)  HR: 97 (11-07-22 @ 06:15)  BP: 145/70 (11-07-22 @ 06:15)  RR: 20 (11-07-22 @ 06:15)  SpO2: 99% (11-07-22 @ 06:15)  Wt(kg): --    11-06 @ 07:01  -  11-07 @ 07:00  --------------------------------------------------------  IN: 0 mL / OUT: 1350 mL / NET: -1350 mL      Physical Exam :-  Constitutional: NAD  Neck: Supple.  Respiratory: Bilateral equal breath sounds,  Cardiovascular: S1, S2 normal,  Gastrointestinal: Bowel Sounds present, soft, non tender.  Extremities: No edema  Neurological: Alert and Oriented x 3, no focal deficits  Psychiatric: Normal mood, normal affect  Data:-  Allergies :   No Known Allergies    Hospital Medications:   MEDICATIONS  (STANDING):  ALBUTerol    90 MICROgram(s) HFA Inhaler 2 Puff(s) Inhalation every 6 hours  aspirin enteric coated 81 milliGRAM(s) Oral daily  atorvastatin 80 milliGRAM(s) Oral at bedtime  BACItracin   Ointment 1 Application(s) Topical daily  buDESOnide    Inhalation Suspension 0.5 milliGRAM(s) Inhalation every 12 hours  collagenase Ointment 1 Application(s) Topical daily  dextrose 5%. 1000 milliLiter(s) (50 mL/Hr) IV Continuous <Continuous>  dextrose 5%. 1000 milliLiter(s) (100 mL/Hr) IV Continuous <Continuous>  dextrose 5%. 1000 milliLiter(s) (60 mL/Hr) IV Continuous <Continuous>  dextrose 50% Injectable 25 Gram(s) IV Push once  dextrose 50% Injectable 25 Gram(s) IV Push once  dextrose 50% Injectable 12.5 Gram(s) IV Push once  dextrose 50% Injectable 25 Gram(s) IV Push once  enoxaparin Injectable 90 milliGRAM(s) SubCutaneous every 12 hours  finasteride 5 milliGRAM(s) Oral daily  furosemide   Injectable 80 milliGRAM(s) IV Push two times a day  glucagon  Injectable 1 milliGRAM(s) IntraMuscular once  insulin glargine Injectable (LANTUS) 7 Unit(s) SubCutaneous at bedtime  insulin lispro (ADMELOG) corrective regimen sliding scale   SubCutaneous three times a day before meals  insulin lispro (ADMELOG) corrective regimen sliding scale   SubCutaneous at bedtime  levothyroxine Injectable 20 MICROGram(s) IV Push at bedtime  metoprolol tartrate 50 milliGRAM(s) Oral two times a day  montelukast 10 milliGRAM(s) Oral daily  pantoprazole  Injectable 40 milliGRAM(s) IV Push every 24 hours  polyethylene glycol 3350 17 Gram(s) Oral every 12 hours  potassium phosphate / sodium phosphate Powder (PHOS-NaK) 1 Packet(s) Oral two times a day  tamsulosin 0.4 milliGRAM(s) Oral at bedtime  tiotropium 18 MICROgram(s) Capsule 1 Capsule(s) Inhalation daily    11-07    152<H>  |  100  |  29<H>  ----------------------------<  209<H>  3.1<L>   |  37<H>  |  0.92    Ca    8.6      07 Nov 2022 07:01  Phos  2.0     11-07  Mg     1.50     11-07      Creatinine Trend: 0.92 <--, 1.11 <--, 0.95 <--, 0.82 <--, 0.76 <--                        8.7    8.66  )-----------( 193      ( 07 Nov 2022 07:01 )             31.3    [facility] - ORESTES Wall / ORESTES Wynne / KARIE Melendez/ ORESTES Knight/ ORESTES Jang/ ADRIA Lee / TOÑITO Mora / ROBB Kapoor  ---------------------------------------------------------------------------------------------------------------  seen and examined today for Hypernatremia  Interval : now COVID +  VITALS:  T(F): 98.5 (11-07-22 @ 06:04), Max: 98.5 (11-07-22 @ 06:04)  HR: 97 (11-07-22 @ 06:15)  BP: 145/70 (11-07-22 @ 06:15)  RR: 20 (11-07-22 @ 06:15)  SpO2: 99% (11-07-22 @ 06:15)  Wt(kg): --    11-06 @ 07:01  -  11-07 @ 07:00  --------------------------------------------------------  IN: 0 mL / OUT: 1350 mL / NET: -1350 mL      Physical Exam :-  Constitutional: NAD  Neck: Supple.  Respiratory: Bilateral equal breath sounds, coarse breath sounds  Cardiovascular: S1, S2 normal,  Gastrointestinal: Bowel Sounds present, soft, non tender.  Extremities: No edema  Neurological: Alert and confused  Psychiatric: Normal mood, normal affect  Data:-  Allergies :   No Known Allergies    Hospital Medications:   MEDICATIONS  (STANDING):  ALBUTerol    90 MICROgram(s) HFA Inhaler 2 Puff(s) Inhalation every 6 hours  aspirin enteric coated 81 milliGRAM(s) Oral daily  atorvastatin 80 milliGRAM(s) Oral at bedtime  BACItracin   Ointment 1 Application(s) Topical daily  buDESOnide    Inhalation Suspension 0.5 milliGRAM(s) Inhalation every 12 hours  collagenase Ointment 1 Application(s) Topical daily  dextrose 5%. 1000 milliLiter(s) (50 mL/Hr) IV Continuous <Continuous>  dextrose 5%. 1000 milliLiter(s) (100 mL/Hr) IV Continuous <Continuous>  dextrose 5%. 1000 milliLiter(s) (60 mL/Hr) IV Continuous <Continuous>  dextrose 50% Injectable 25 Gram(s) IV Push once  dextrose 50% Injectable 25 Gram(s) IV Push once  dextrose 50% Injectable 12.5 Gram(s) IV Push once  dextrose 50% Injectable 25 Gram(s) IV Push once  enoxaparin Injectable 90 milliGRAM(s) SubCutaneous every 12 hours  finasteride 5 milliGRAM(s) Oral daily  furosemide   Injectable 80 milliGRAM(s) IV Push two times a day  glucagon  Injectable 1 milliGRAM(s) IntraMuscular once  insulin glargine Injectable (LANTUS) 7 Unit(s) SubCutaneous at bedtime  insulin lispro (ADMELOG) corrective regimen sliding scale   SubCutaneous three times a day before meals  insulin lispro (ADMELOG) corrective regimen sliding scale   SubCutaneous at bedtime  levothyroxine Injectable 20 MICROGram(s) IV Push at bedtime  metoprolol tartrate 50 milliGRAM(s) Oral two times a day  montelukast 10 milliGRAM(s) Oral daily  pantoprazole  Injectable 40 milliGRAM(s) IV Push every 24 hours  polyethylene glycol 3350 17 Gram(s) Oral every 12 hours  potassium phosphate / sodium phosphate Powder (PHOS-NaK) 1 Packet(s) Oral two times a day  tamsulosin 0.4 milliGRAM(s) Oral at bedtime  tiotropium 18 MICROgram(s) Capsule 1 Capsule(s) Inhalation daily    11-07    152<H>  |  100  |  29<H>  ----------------------------<  209<H>  3.1<L>   |  37<H>  |  0.92    Ca    8.6      07 Nov 2022 07:01  Phos  2.0     11-07  Mg     1.50     11-07      Creatinine Trend: 0.92 <--, 1.11 <--, 0.95 <--, 0.82 <--, 0.76 <--                        8.7    8.66  )-----------( 193      ( 07 Nov 2022 07:01 )             31.3

## 2022-11-07 NOTE — PROGRESS NOTE ADULT - NSPROGADDITIONALINFOA_GEN_ALL_CORE
paged acp : awaiting response:    10/28: haley acp and thoracic NP    10/29: paged acp    10/30: dw acp    10/31: haley acp    11/1 : dc acp    11/2: pt Is not doing well : haley HCP:  : they do now want any further intervention: no ct scan chest  : cont chest pt and vest therapy : haley acp  : FOR HOME HOSPICE NEXT WEEK     11/4: haley mai:  the HCP in detail : hold off ct scan chest for now: she will talk to her ssiter:     11/4: haley ac     11/13: pt is doing very poorly:  on 100% NRB  : cont current care  : palliative care following ? for home hospice: haley acp    11/6: now covid positive: ? Mab?    11/07: no rx for covid per family wishes:  now for home hospice:

## 2022-11-07 NOTE — PROGRESS NOTE ADULT - ASSESSMENT
Patient is an 87 year old M hx of severe systolic CHF, b/l pleural effusion s/p thoracentesis in the past, SSS w/ pacemaker w/ f/u w/ EP, CVA w/ residual left sided UE weakness, chronic hudson, BPH, COPD/asthma, restrictive lung dx 2/2 obesity, right AKA, hypothyroid, afib on coumadin who presents from [facility] w/ lethargy and sob.

## 2022-11-07 NOTE — CHART NOTE - NSCHARTNOTEFT_GEN_A_CORE
HPI- 87M w/ PMHx of severe systolic CHF, COPD, b/l pleural effusion s/p thoracentesis in the past, SSS w/ pacemaker w/ f/u w/ EP, CVA w/ residual left sided UE weakness, BPH w/ chronic hudson, COPD/asthma, restrictive lung dx 2/2 obesity, right AKA, hypothyroid, afib on coumadin who presents from [facility] w/ lethargy and SOB, found to have hypernatremia and admitted for acute respiratory failure with hypoxia 2/2 large pleural effusion and pulmonary vascular congestion, s/p bipap, echo with severe LV dysfunction. Cardiology and Pulmonology consulted s/p thoracentesis. Course c/b hypernatremia and left foot wounds.    Patient is a  DNR/DNI, going Home on Home Hospice, with the request for no further lab work or procedures to be done. Patient case discussed with Attending Dr Keenan, who agrees with the plan. Patient full comfort care, no further fingersticks, and no further injections to be given.

## 2022-11-08 NOTE — PROGRESS NOTE ADULT - SUBJECTIVE AND OBJECTIVE BOX
CARDIOLOGY FOLLOW UP - Dr. Stephens  Date of Service: 11/8/2022  CC: events noted    Review of Systems:  Constitutional: No fever, weight loss, or fatigue  Respiratory: No cough, wheezing, or hemoptysis, no shortness of breath  Cardiovascular: No chest pain, palpitations, passing out, dizziness, or leg swelling  Gastrointestinal: No abd or epigastric pain. No nausea, vomiting, or hematemesis; no diarrhea or consiptaiton, no melena or hematochezia  Vascular: No edema     TELEMETRY:    PHYSICAL EXAM:  T(C): 37.1 (11-08-22 @ 05:00), Max: 37.1 (11-08-22 @ 05:00)  HR: 110 (11-08-22 @ 05:00) (98 - 110)  BP: 121/55 (11-08-22 @ 05:00) (121/55 - 148/61)  RR: 25 (11-08-22 @ 10:00) (20 - 25)  SpO2: 96% (11-08-22 @ 10:00) (84% - 100%)  Wt(kg): --  I&O's Summary    07 Nov 2022 07:01  -  08 Nov 2022 07:00  --------------------------------------------------------  IN: 0 mL / OUT: 625 mL / NET: -625 mL        Appearance: Normal	  Cardiovascular: Normal S1 S2,RRR, No JVD, No murmurs  Respiratory: Lungs clear to auscultation	  Gastrointestinal:  Soft, Non-tender, + BS	  Extremities: Normal range of motion, No clubbing, cyanosis or edema  Vascular: Peripheral pulses palpable 2+ bilaterally       Home Medications:  acetaminophen 325 mg oral tablet: 2 tab(s) orally every 6 hours, As needed, Moderate Pain (4 - 6) (23 Sep 2022 16:43)  acetylcysteine 20% inhalation solution: 4 milliliter(s) inhaled 4 times a day as needed (25 Oct 2022 10:37)  acetylcysteine 20% inhalation solution: 4 milliliter(s) inhaled 3 times a day (23 Sep 2022 16:43)  aspirin 81 mg oral delayed release tablet: 1 tab(s) orally once a day (23 Sep 2022 16:43)  Aspirin Enteric Coated 81 mg oral delayed release tablet: 1 tab(s) orally once a day (25 Oct 2022 10:37)  atorvastatin 80 mg oral tablet: 1 tab(s) orally once a day (at bedtime) (23 Sep 2022 16:43)  bacitracin 500 units/g topical ointment: Apply topically to affected area twice to back (25 Oct 2022 10:37)  budesonide 0.5 mg/2 mL inhalation suspension: 0.5 milligram(s) inhaled 2 times a day (14 Sep 2022 19:13)  budesonide 0.5 mg/2 mL inhalation suspension: 2 milliliter(s) inhaled 2 times a day (25 Oct 2022 10:37)  cadexomer iodine 0.9% topical gel: 1 application topically once a day (23 Sep 2022 16:43)  Coumadin 3 mg oral tablet: 1 tab(s) orally once a day (at bedtime) (23 Sep 2022 16:43)  Coumadin 4 mg oral tablet: 1 tab(s) orally once a day (25 Oct 2022 10:37)  finasteride 5 mg oral tablet: 1 tab(s) orally once a day (14 Sep 2022 19:13)  finasteride 5 mg oral tablet: 1 tab(s) orally once a day (25 Oct 2022 10:37)  Flomax 0.4 mg oral capsule: 1 cap(s) orally once a day (25 Oct 2022 10:37)  gabapentin 100 mg oral capsule: 1 tab(s) orally 2 times a day (25 Oct 2022 10:37)  gabapentin 100 mg oral tablet: 1 tab(s) orally 2 times a day (14 Sep 2022 19:13)  guaifenesin-dextromethorphan 100 mg-10 mg/5 mL oral liquid: 10 milliliter(s) orally every 6 hours (23 Sep 2022 16:43)  insulin glargine 100 units/mL subcutaneous solution: 10 unit(s) subcutaneous once a day (at bedtime) (23 Sep 2022 16:43)  insulin lispro 100 units/mL injectable solution: 14 unit(s) injectable once a day before dinner (23 Sep 2022 18:00)  insulin lispro 100 units/mL injectable solution: 14 unit(s) injectable once a day before lunch (23 Sep 2022 18:00)  insulin lispro 100 units/mL injectable solution: 20 unit(s) injectable once a day before Breakfast (23 Sep 2022 18:00)  insulin lispro 100 units/mL subcutaneous solution: 14 unit(s) subcutaneous before lunch and dinner (25 Oct 2022 10:37)  Iodosorb 0.9% topical gel: to heel (25 Oct 2022 10:37)  ipratropium: inhalation as needed (25 Oct 2022 10:37)  ipratropium-albuterol 0.5 mg-2.5 mg/3 mL inhalation solution: 3 milliliter(s) inhaled every 6 hours (23 Sep 2022 16:43)  Lantus 100 units/mL subcutaneous solution: 10 unit(s) subcutaneous once a day (at bedtime) (25 Oct 2022 10:37)  Lasix 20 mg oral tablet: 1 tab(s) orally once a day (25 Oct 2022 10:37)  Lipitor 80 mg oral tablet: 1 tab(s) orally once a day (25 Oct 2022 10:37)  medihoney:  (25 Oct 2022 10:37)  metoprolol tartrate 50 mg oral tablet: 1 tab(s) orally 2 times a day (23 Sep 2022 16:43)  Metoprolol Tartrate 50 mg oral tablet: 1 tab(s) orally 2 times a day (25 Oct 2022 10:37)  montelukast 10 mg oral tablet: 1 tab(s) orally once a day (at bedtime) (23 Sep 2022 16:43)  Multiple Vitamins with Minerals oral tablet: 1 tab(s) orally once a day (23 Sep 2022 16:43)  pantoprazole 40 mg oral delayed release tablet: 1 tab(s) orally once a day (before a meal) (23 Sep 2022 16:43)  polyethylene glycol 3350 oral powder for reconstitution: 17 gram(s) orally once a day (23 Sep 2022 16:43)  predniSONE 50 mg oral tablet: 1 tab(s) orally once a day for one more dose on 9/24/22 then D/C (23 Sep 2022 16:43)  Protonix 40 mg oral delayed release tablet: 1 tab(s) orally once a day (25 Oct 2022 10:37)  senna leaf extract oral tablet: 2 tab(s) orally once a day (at bedtime) (23 Sep 2022 16:43)  Singulair 10 mg oral tablet: 1 tab(s) orally once a day (25 Oct 2022 10:37)  sodium chloride 3% inhalation solution: 4 milliliter(s) inhaled every 12 hours (23 Sep 2022 16:43)  Synthroid 25 mcg (0.025 mg) oral tablet: 1 tab(s) orally once a day (25 Oct 2022 10:37)  Synthroid 25 mcg (0.025 mg) oral tablet: 1 tab(s) orally once a day (14 Sep 2022 19:13)  tamsulosin 0.4 mg oral capsule: 2 cap(s) orally once a day (at bedtime) (14 Sep 2022 19:13)  zinc oxide topical cream: Apply topically to affected area twice to AKA (25 Oct 2022 10:37)        MEDICATIONS  (STANDING):  ALBUTerol    90 MICROgram(s) HFA Inhaler 2 Puff(s) Inhalation every 6 hours  aspirin enteric coated 81 milliGRAM(s) Oral daily  atorvastatin 80 milliGRAM(s) Oral at bedtime  BACItracin   Ointment 1 Application(s) Topical daily  buDESOnide    Inhalation Suspension 0.5 milliGRAM(s) Inhalation every 12 hours  collagenase Ointment 1 Application(s) Topical daily  finasteride 5 milliGRAM(s) Oral daily  levothyroxine Injectable 20 MICROGram(s) IV Push at bedtime  metoprolol tartrate 50 milliGRAM(s) Oral two times a day  montelukast 10 milliGRAM(s) Oral daily  pantoprazole  Injectable 40 milliGRAM(s) IV Push every 24 hours  polyethylene glycol 3350 17 Gram(s) Oral every 12 hours  tamsulosin 0.4 milliGRAM(s) Oral at bedtime  tiotropium 18 MICROgram(s) Capsule 1 Capsule(s) Inhalation daily        EKG:  RADIOLOGY:  DIAGNOSTIC TESTING:  [ ] Echocardiogram:  [ ] Catherterization:  [ ] Stress Test:  OTHER:     LABS:	 	                          8.7    8.66  )-----------( 193      ( 07 Nov 2022 07:01 )             31.3     11-07    152<H>  |  100  |  29<H>  ----------------------------<  209<H>  3.1<L>   |  37<H>  |  0.92    Ca    8.6      07 Nov 2022 07:01  Phos  2.0     11-07  Mg     1.50     11-07            CARDIAC MARKERS:

## 2022-11-08 NOTE — PROGRESS NOTE ADULT - NSPROGADDITIONALINFOA_GEN_ALL_CORE
Dr Melendez  Nephrology   [facility]  office [phone]  [facility] erxt- [phone]

## 2022-11-08 NOTE — PROGRESS NOTE ADULT - ASSESSMENT
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

## 2022-11-08 NOTE — CHART NOTE - NSCHARTNOTEFT_GEN_A_CORE
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     Nutrition Course: Pt A&Ox1, unable to speak. No recent episodes of nausea, vomiting, diarrhea or constipation, last BM noted on 11/7 per RN flowsheets. No reported chewing/swallowing difficulties with current texture puree, moderately thick liquids. Intake is ranges % per RN flowsheets. Last seen by wound care 11/2 for stage 1 pressure injury left buttock. Weight shifts may be due to edema. Pt is comfort care.     Diet Prescription: Diet, Pureed:   Consistent Carbohydrate {Evening Snack} (CSTCHOSN)  DASH/TLC {Sodium & Cholesterol Restricted} (DASH)  Moderately Thick Liquids (MODTHICKLIQS) (10-27-22 @ 11:35)    Pertinent Medications: MEDICATIONS  (STANDING):  ALBUTerol    90 MICROgram(s) HFA Inhaler 2 Puff(s) Inhalation every 6 hours  aspirin enteric coated 81 milliGRAM(s) Oral daily  atorvastatin 80 milliGRAM(s) Oral at bedtime  BACItracin   Ointment 1 Application(s) Topical daily  buDESOnide    Inhalation Suspension 0.5 milliGRAM(s) Inhalation every 12 hours  collagenase Ointment 1 Application(s) Topical daily  finasteride 5 milliGRAM(s) Oral daily  levothyroxine Injectable 20 MICROGram(s) IV Push at bedtime  metoprolol tartrate 50 milliGRAM(s) Oral two times a day  montelukast 10 milliGRAM(s) Oral daily  pantoprazole  Injectable 40 milliGRAM(s) IV Push every 24 hours  polyethylene glycol 3350 17 Gram(s) Oral every 12 hours  tamsulosin 0.4 milliGRAM(s) Oral at bedtime  tiotropium 18 MICROgram(s) Capsule 1 Capsule(s) Inhalation daily    MEDICATIONS  (PRN):  acetaminophen     Tablet .. 650 milliGRAM(s) Oral every 6 hours PRN Mild Pain (1 - 3), Moderate Pain (4 - 6)  sodium chloride 0.65% Nasal 1 Spray(s) Both Nostrils every 4 hours PRN Nasal Congestion    Pertinent Labs: 11-07 Na152 mmol/L<H> Glu 209 mg/dL<H> K+ 3.1 mmol/L<L> Cr  0.92 mg/dL BUN 29 mg/dL<H> 11-07 Phos 2.0 mg/dL<L>      Weight: Height (cm): 172.7 (10-25 @ 01:53), 172.7 (09-14 @ 11:55)  Weight (kg): 89.5 (11-01 @ 06:10), 86.2 (09-14 @ 11:55)  BMI (kg/m2): 30 (11-01 @ 06:10), 28.9 (10-25 @ 01:53), 28.9 (09-14 @ 11:55)  Weight Assessment: 10/26: 92.8kg, 11/1: 89.5kg,  11/6: 86.7kg. (-2.5% weight loss x1 weeks. Weight shifts may be due to edema, intake has improved %)      Physical Assessment, per flowsheets:  Edema: +1 edema left ankle and leg, +4 edema left arm and wrist.   Skin: stage 1 left buttock per RN flowsheets       Estimated Needs:   [X] No change since previous assessment    Previous Nutrition Diagnosis: [x ] Inadequate Energy Intake   Nutrition Diagnosis is [ ] ongoing  [x ] resolved [ ] not applicable   New Nutrition Diagnosis: Unintended weight loss related to fluid shifts/edema as evidence by 2.5% weight loss x week, 6% weight loss x2 weeks).    Interventions:   1) Recommend liberalize diet: CSTCHO, low sodium   2) Encourage PO intake and honor food preferences as able, provide assistance with meals.   3) RD available prn.    Monitor & Evaluate:  PO intake, tolerance to diet/supplement, nutrition related lab values, weight trends, BMs/GI distress, hydration status, skin integrity.    [place] RD| [phone] (Also available on TEAMS)

## 2022-11-08 NOTE — PROGRESS NOTE ADULT - SUBJECTIVE AND OBJECTIVE BOX
88-90% on 40% Hi-Jaylon NCO2  99% on 100% NRB  Home hospice unable to accommodate NRB    Vital Signs Last 24 Hrs  T(C): 37.1 (08 Nov 2022 05:00), Max: 37.1 (08 Nov 2022 05:00)  T(F): 98.8 (08 Nov 2022 05:00), Max: 98.8 (08 Nov 2022 05:00)  HR: 87 (08 Nov 2022 13:20) (87 - 110)  BP: 121/55 (08 Nov 2022 05:00) (121/55 - 148/61)  BP(mean): --  RR: 24 (08 Nov 2022 13:20) (20 - 25)  SpO2: 99% (08 Nov 2022 13:20) (84% - 100%)    I&O's Summary    11-07-22 @ 07:01  -  11-08-22 @ 07:00  --------------------------------------------------------  IN: 0 mL / OUT: 625 mL / NET: -625 mL        PHYSICAL EXAM:  GENERAL: lethargic  HEAD:  Atraumatic, Normocephalic  EYES: EOMI, PERRLA, conjunctiva and sclera clear  NECK: Supple, No JVD  CHEST/LUNG: mild decrease breath sounds bilaterally; No wheeze   HEART: Irregular rate and rhythm; No murmurs, rubs, or gallops  ABDOMEN: Soft, Nontender, Nondistended; Bowel sounds present  : chronic Adair in place, britney color urine, neg CVAT   Neuro: AAOx1, no focal weakness, mild left UE weakness baseline   EXTREMITIES:  + Peripheral Pulses, No clubbing, cyanosis, + edema, right AKA, left foot dressing d/c/i, + arm edema      LABS:                        8.7    8.66  )-----------( 193      ( 07 Nov 2022 07:01 )             31.3     11-07    152<H>  |  100  |  29<H>  ----------------------------<  209<H>  3.1<L>   |  37<H>  |  0.92    Ca    8.6      07 Nov 2022 07:01  Phos  2.0     11-07  Mg     1.50     11-07        CAPILLARY BLOOD GLUCOSE                RADIOLOGY & ADDITIONAL TESTS:    Imaging Personally Reviewed:  [x] YES  [ ] NO    Will obtain old records:  [ ] YES  [x] NO

## 2022-11-08 NOTE — PROGRESS NOTE ADULT - ASSESSMENT
TTE with Doppler (w/Cont) (04.03.22 @ 15:07) >  mild aortic stenosis. . Mild left ventricular systolic dysfunction. The inferior wall, and the inferoseptum are hypokinetic.  Echo 9/15/22: .EF 35-40% Severe segmental left ventricular systolic dysfunction. The mid to distal anteroseptum and severely hypokinetic. The mid to basal inferolateral wall is hypokinetic.      a/p  86 yo male pmhx BPH, PAD s/p R BKA, COPD (not on home O2), HTN, HLD, afib, PPM ([employer])  CVA w/ residual L hemiparesis on coumadin, insulin-dependent diabetes, sys CHF on lasix presents with shortness of breath    #SOB   -pt now awaiting hospics  -cont supportive care  -cont supplem O2 per pulm  -lasix held  -s/p 1500 cc thoracentesis 10/31/2022  -Pulm/Med/thoracic  f/u  -recent echo with .EF 35-40% Severe segmental left ventricular systolic dysfunction. The mid to distal anteroseptum and severely hypokinetic. The mid to basal inferolateral wall is hypokinetic.  -thoracic surgery f/u    # acute on chronic systolic CHF   -cont supportive care  -echo with moderate severe lv dysfxn, unchanged from echo  4/2022  -continue medical management of CMP/HF  -ecg, no ischemic changes, HS trop elevated secondary to hypoxia/ chf/ resp infection   -c/w bb     #Pafib, sp PPM   -c/w BB  -off oral ac  -cont AC    #mild as   -echo stable     #Abnl UA  -abx per med      #hypernatremia   -renal f/u  -hold alsix    45 minutes spent on total encounter; more than 50% of the visit was spent counseling and/or coordinating care by the attending physician.    poor prognosis  dnr/dni  pt awaiting for home hospice

## 2022-11-08 NOTE — PROGRESS NOTE ADULT - SUBJECTIVE AND OBJECTIVE BOX
[facility] : [facility] Serv [phone], Office [phone]  Dr Melendez/Dr Wall  /Dr Alex butt /Dr SHAHANA Knight/Dr Joshua Jang/Dr Jeremi Lee /Dr KENDALL Mora  _______________________________________________________________________________________________    seen and examined today for high na level and low serum k levle  Interval : low k level noted   VITALS:  T(F): 98.8 (11-08 @ 05:00), Max: 98.8 (11-08 @ 05:00)  HR: 110 (11-08 @ 05:00)  BP: 121/55 (11-08 @ 05:00)  ABP: --  RR: 22 (11-08 @ 05:00)  SpO2: 95% (11-08 @ 05:00)    11-07 @ 07:01  -  11-08 @ 07:00  --------------------------------------------------------  IN: 0 mL / OUT: 625 mL / NET: -625 mL    Physical Exam :-  Constitutional: NAD  Respiratory: Bilateral equal breath sounds, no Crackles present.  Cardiovascular: S1, S2 normal, positive Murmur  Gastrointestinal: Bowel Sounds present, soft  Extremities: no Edema Feet  Neurological: Alert     Data:-  Allergies :   No Known Allergies    Hospital Medications:   MEDICATIONS  (STANDING):  ALBUTerol    90 MICROgram(s) HFA Inhaler 2 Puff(s) Inhalation every 6 hours  aspirin enteric coated 81 milliGRAM(s) Oral daily  atorvastatin 80 milliGRAM(s) Oral at bedtime  BACItracin   Ointment 1 Application(s) Topical daily  buDESOnide    Inhalation Suspension 0.5 milliGRAM(s) Inhalation every 12 hours  collagenase Ointment 1 Application(s) Topical daily  finasteride 5 milliGRAM(s) Oral daily  levothyroxine Injectable 20 MICROGram(s) IV Push at bedtime  metoprolol tartrate 50 milliGRAM(s) Oral two times a day  montelukast 10 milliGRAM(s) Oral daily  pantoprazole  Injectable 40 milliGRAM(s) IV Push every 24 hours  polyethylene glycol 3350 17 Gram(s) Oral every 12 hours  tamsulosin 0.4 milliGRAM(s) Oral at bedtime  tiotropium 18 MICROgram(s) Capsule 1 Capsule(s) Inhalation daily    11-07    152<H>  |  100  |  29<H>  ----------------------------<  209<H>  3.1<L>   |  37<H>  |  0.92    Ca    8.6      07 Nov 2022 07:01  Phos  2.0     11-07  Mg     1.50     11-07      Creatinine Trend: 0.92 <--, 1.11 <--, 0.95 <--, 0.82 <--  egfr trend : 81 <--, 64 <--, 77 <--, 85 <--, 87 <--, 82 <--, 83 <--                        8.7    8.66  )-----------( 193      ( 07 Nov 2022 07:01 )             31.3

## 2022-11-08 NOTE — PROGRESS NOTE ADULT - PROBLEM SELECTOR PLAN 1
- 2/2 large pleural effusion and pulm vasc congestion  - troponin elevation likely 2/2 CHF exacerbation (stable x 3 sets). card following   - TTE: Severe global left ventricular systolic dysfunction. EF 28%  - tele monitoring: no events. paced rhythm.   - c/w Lasix 40 mg iv BID, has chronic Adair for strict ins and outs.  - pulm consult: Dr. Felton.   - CT surgery on the case for thoracentesis (INR 2.5, procedure canceled)  - vit K PO x 1 for elevated INR in anticipation for thoracentesis, which was done 10/31/22: s/p 1500 cc fluid removed.   - no plans for further imaging or invasive procedures  - poor prognosis, comfort care only  - Advance directives discussed with the daughter Willow HCP requests palliative eval. Consulted.   - The daughter Willow asking for DNR/DNI. wishes that he will be going home with home hospice once he is a little better optimized. d/w palliative team Delmy.  - 88-90% on 40% Hi-Jaylon NCO2  - 99% on 100% NRB  - Home hospice unable to accommodate NRB  - Low-dose IV MSO4 as need for relieving work of breathing is appropriate

## 2022-11-08 NOTE — PROGRESS NOTE ADULT - ASSESSMENT
86 y/o M w/ left foot wounds  - Pt seen and evaluated  - Afebrile, no leukocytosis  - Punctate posterior heel wound to level of subq with fibrotic base, no undermining or tracking, no purulence or malodor, lateral 5th MPJ punctate wound to level of capsule with fibrotic base, mild periwound erythema, no drainage or clinical signs of infection. Right BKA.  - No wound culture obtained as it would likely yield skin contaminant  - L foot XR: No OM, no tracking soft tissue air  - Offload L heel with ZFLOW boot at all times  - Pod plan LWC  - Per daughter, pt and family are in agreement w/ vasc (Dr. Quinn) to not pursue any further intervention for revascularization of LLE, will forego ALMAZ/PVR at this time in the absence of tripp gangrene/ischemia  - Will follow

## 2022-11-08 NOTE — PROGRESS NOTE ADULT - ASSESSMENT
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 Renal following for Hypernatremia Mx    Hypernatremia 2/2 free water deficit -Worsening serum na level  Hypokalemia :   Serum Sodium : 152 <--, 148 <--, 142 <--, 143 <--  off lasix at this time  serum k level lower,  serum phos levle lower     Plan  free water via oral/feed- if not npo  daily basic metabolic panel  supplement oral Potassium Chloride 40 meq 1 dose  supplememt phosphorus , check mg- level   recheck basic metabolic panel in afternoon for correction check for serum k level     Acute respiratory failure with hypoxia 2/2 large pleural effusion and pulm vasc congestion  -CMP - TTE: Severe global left ventricular systolic dysfunction. 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 Renal following for Hypernatremia Mx    Hypernatremia 2/2 free water deficit -Worsening serum na level  Hypokalemia :   Serum Sodium : 152 <--, 148 <--, 142 <--, 143 <--  off lasix at this time  serum k level lower- yesterday   Serum Potassium mmol/dl : 3.1 (07 Nov 2022 07:01)<--, 3.6 (04 Nov 2022 04:30)<--  serum phos levle lower - yesterday    Plan  free water via oral/feed- if not npo  daily basic metabolic panel  supplement oral Potassium Chloride 40 meq 1 dose- if today labs shows lower k level   supplememt phosphorus , check mg- level   recheck basic metabolic panel in afternoon for correction check for serum k level     Acute respiratory failure with hypoxia 2/2 large pleural effusion and pulm vasc congestion  -CMP - TTE: Severe global left ventricular systolic dysfunction. EF 28%  -tele monitoring  - off lasix     - f/u pulm   - f/u CT surgery eval for thoracentesis

## 2022-11-09 NOTE — PROGRESS NOTE ADULT - ASSESSMENT
TTE with Doppler (w/Cont) (04.03.22 @ 15:07) >  mild aortic stenosis. . Mild left ventricular systolic dysfunction. The inferior wall, and the inferoseptum are hypokinetic.  Echo 9/15/22: .EF 35-40% Severe segmental left ventricular systolic dysfunction. The mid to distal anteroseptum and severely hypokinetic. The mid to basal inferolateral wall is hypokinetic.      a/p  86 yo male pmhx BPH, PAD s/p R BKA, COPD (not on home O2), HTN, HLD, afib, PPM ([employer])  CVA w/ residual L hemiparesis on coumadin, insulin-dependent diabetes, sys CHF on lasix presents with shortness of breath    #SOB   -pt now awaiting hospice  -cont supportive care  -cont supplm O2 per pulm  -lasix held,   -s/p 1500 cc thoracentesis 10/31/2022  -Pulm/Med/thoracic  f/u  -recent echo with .EF 35-40% Severe segmental left ventricular systolic dysfunction. The mid to distal anteroseptum and severely hypokinetic. The mid to basal inferolateral wall is hypokinetic.  -thoracic surgery f/u    # acute on chronic systolic CHF   -cont supportive care  -echo with moderate severe lv dysfxn, unchanged from echo  4/2022  -continue medical management of CMP/HF  -ecg, no ischemic changes, HS trop elevated secondary to hypoxia/ chf/ resp infection   -c/w bb     #Pafib, sp PPM   -c/w BB  -off oral ac  -cont AC    #mild as   -echo stable     #Abnl UA  -abx per med      #hypernatremia   -renal f/u  -hold alsix    45 minutes spent on total encounter; more than 50% of the visit was spent counseling and/or coordinating care by the attending physician.    poor prognosis  dnr/dni  pt awaiting for home hospice

## 2022-11-09 NOTE — PROGRESS NOTE ADULT - ASSESSMENT
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 Renal following for Hypernatremia Mx    Hypernatremia 2/2 free water deficit -Worsening serum na level  Hypokalemia :   Serum Sodium : 152 <--, 148 <--, 142 <--, 143 <--  off lasix at this time  Serum Potassium mmol/dl : 3.1 (07 Nov 2022 07:01)<--, 3.6 (04 Nov 2022 04:30)<--  serum phos levle lower - yesterday    Plan  free water via oral/feed- if not npo  at this time comforat care as per team,   labs to be drawn as per team-   call us if needed    Acute respiratory failure with hypoxia 2/2 large pleural effusion and pulm vasc congestion  -CMP - TTE: Severe global left ventricular systolic dysfunction. EF 28%  -tele monitoring  - off lasix     - f/u pulm   - f/u CT surgery eval for thoracentesis

## 2022-11-09 NOTE — PROGRESS NOTE ADULT - SUBJECTIVE AND OBJECTIVE BOX
[facility] : Ans Serv [phone], Office [phone]  Dr Melendez/Dr Wall  /Dr Alex butt /Dr SHAHANA Knight/Dr Joshua Jang/Dr Jeremi Lee /Dr KENDALL Mora  _______________________________________________________________________________________________    seen and examined today for renal failure  Interval :  VITALS:  T(F): 98.4 (11-09 @ 11:24), Max: 98.8 (11-08 @ 05:00)  HR: 92 (11-09 @ 11:24)  BP: 114/60 (11-09 @ 11:24)  ABP: --  RR: 20 (11-09 @ 11:24)  SpO2: 98% (11-09 @ 11:24)    Physical Exam :-  Constitutional: NAD  Respiratory: Bilateral equal breath sounds, no Crackles present.  Cardiovascular: S1, S2 normal, positive Murmur  Gastrointestinal: Bowel Sounds present, soft  Extremities: no Edema Feet      Data:-  Allergies :   No Known Allergies    Hospital Medications:   MEDICATIONS  (STANDING):  ALBUTerol    90 MICROgram(s) HFA Inhaler 2 Puff(s) Inhalation every 6 hours  aspirin enteric coated 81 milliGRAM(s) Oral daily  atorvastatin 80 milliGRAM(s) Oral at bedtime  BACItracin   Ointment 1 Application(s) Topical daily  carboxymethylcellulose 0.5% (Preservative-Free) Ophthalmic Solution 1 Drop(s) Both EYES every 3 hours  collagenase Ointment 1 Application(s) Topical daily  finasteride 5 milliGRAM(s) Oral daily  levothyroxine Injectable 20 MICROGram(s) IV Push at bedtime  metoprolol tartrate 50 milliGRAM(s) Oral two times a day  montelukast 10 milliGRAM(s) Oral daily  pantoprazole  Injectable 40 milliGRAM(s) IV Push every 24 hours  polyethylene glycol 3350 17 Gram(s) Oral every 12 hours  tamsulosin 0.4 milliGRAM(s) Oral at bedtime  tiotropium 18 MICROgram(s) Capsule 1 Capsule(s) Inhalation daily          Creatinine Trend: 0.92 <--, 1.11 <--, 0.95 <--  egfr trend : 81 <--, 64 <--, 77 <--, 85 <--, 87 <--, 82 <--, 83 <--

## 2022-11-09 NOTE — PROGRESS NOTE ADULT - NSPROGADDITIONALINFOA_GEN_ALL_CORE
Dr Melendez  Nephrology   [facility]  office [phone]  [facility] qfmm- [phone]

## 2022-11-09 NOTE — PROGRESS NOTE ADULT - PROBLEM SELECTOR PLAN 1
- 2/2 large pleural effusion and pulm vasc congestion  - troponin elevation likely 2/2 CHF exacerbation (stable x 3 sets). card following   - TTE: Severe global left ventricular systolic dysfunction. EF 28%  - tele monitoring: no events. paced rhythm.   - c/w Lasix 40 mg iv BID, has chronic Adair for strict ins and outs.  - pulm consult: Dr. Felton.   - CT surgery on the case for thoracentesis (INR 2.5, procedure canceled)  - vit K PO x 1 for elevated INR in anticipation for thoracentesis, which was done 10/31/22: s/p 1500 cc fluid removed.   - no plans for further imaging or invasive procedures  - poor prognosis, comfort care only  - Advance directives discussed with the daughter Willow HCP requests palliative eval. Consulted.   - The daughter Willow asking for DNR/DNI. wishes that he will be going home with home hospice once he is a little better optimized. d/w palliative team Delmy.  - 88-90% on 40% Hi-Jaylon NCO2  - 99% on 100% NRB  - Home hospice unable to accommodate NRB  - Low-dose IV MSO4 as need for relieving work of breathing is appropriate - 2/2 large pleural effusion and pulm vasc congestion  - troponin elevation likely 2/2 CHF exacerbation (stable x 3 sets). card following   - TTE: Severe global left ventricular systolic dysfunction. EF 28%  - tele monitoring: no events. paced rhythm.   - c/w Lasix 40 mg iv BID, has chronic Adair for strict ins and outs.  - pulm consult: Dr. Felton.   - CT surgery on the case for thoracentesis (INR 2.5, procedure canceled)  - vit K PO x 1 for elevated INR in anticipation for thoracentesis, which was done 10/31/22: s/p 1500 cc fluid removed.   - no plans for further imaging or invasive procedures  - poor prognosis, comfort care only  - Advance directives discussed with the daughter Willow HCP requests palliative eval. Consulted.   - The daughter Willow asking for DNR/DNI. wishes that he will be going home with home hospice once he is a little better optimized. d/w palliative team Delmy.  - 88-90% on 40% Hi-Jaylon NCO2  - 99% on 100% NRB  - Home hospice unable to accommodate NRB  - Low-dose IV MSO4 as need for relieving work of breathing is appropriate  - weaning down O2 as tolerates

## 2022-11-09 NOTE — PROGRESS NOTE ADULT - SUBJECTIVE AND OBJECTIVE BOX
SUBJECTIVE/ OVERNIGHT EVENTS:  Pt seen and examined earlier today.   hi-flow being weaned to NC   comfortable.   the daughter Nola at bedside.   denied pain.   no cp, no sob, no n/v/d.  no HA, no dizziness.       --------------------------------------------------------------------------------------------  LABS:            CAPILLARY BLOOD GLUCOSE                RADIOLOGY & ADDITIONAL TESTS:    Imaging Personally Reviewed:  [x] YES  [ ] NO    Consultant(s) Notes Reviewed:  [x] YES  [ ] NO    MEDICATIONS  (STANDING):  ALBUTerol    90 MICROgram(s) HFA Inhaler 2 Puff(s) Inhalation every 6 hours  aspirin enteric coated 81 milliGRAM(s) Oral daily  atorvastatin 80 milliGRAM(s) Oral at bedtime  BACItracin   Ointment 1 Application(s) Topical daily  carboxymethylcellulose 0.5% (Preservative-Free) Ophthalmic Solution 1 Drop(s) Both EYES every 3 hours  collagenase Ointment 1 Application(s) Topical daily  finasteride 5 milliGRAM(s) Oral daily  levothyroxine Injectable 20 MICROGram(s) IV Push at bedtime  metoprolol tartrate 50 milliGRAM(s) Oral two times a day  montelukast 10 milliGRAM(s) Oral daily  pantoprazole  Injectable 40 milliGRAM(s) IV Push every 24 hours  polyethylene glycol 3350 17 Gram(s) Oral every 12 hours  tamsulosin 0.4 milliGRAM(s) Oral at bedtime  tiotropium 18 MICROgram(s) Capsule 1 Capsule(s) Inhalation daily    MEDICATIONS  (PRN):  acetaminophen     Tablet .. 650 milliGRAM(s) Oral every 6 hours PRN Mild Pain (1 - 3), Moderate Pain (4 - 6)  sodium chloride 0.65% Nasal 1 Spray(s) Both Nostrils every 4 hours PRN Nasal Congestion      Care Discussed with Consultants/Other Providers [x] YES  [ ] NO    Vital Signs Last 24 Hrs  T(C): 36.9 (09 Nov 2022 11:24), Max: 36.9 (09 Nov 2022 11:24)  T(F): 98.4 (09 Nov 2022 11:24), Max: 98.4 (09 Nov 2022 11:24)  HR: 92 (09 Nov 2022 11:24) (88 - 109)  BP: 114/60 (09 Nov 2022 11:24) (100/50 - 114/60)  BP(mean): --  RR: 20 (09 Nov 2022 11:24) (20 - 24)  SpO2: 98% (09 Nov 2022 11:24) (97% - 100%)    Parameters below as of 09 Nov 2022 11:24  Patient On (Oxygen Delivery Method): mask, nonrebreather  O2 Flow (L/min): 15    I&O's Summary          PHYSICAL EXAM:  GENERAL: awake, comfortable, hi-flow being weaned to NC   HEAD:  Atraumatic, Normocephalic  EYES: EOMI, PERRLA, conjunctiva and sclera clear  NECK: Supple, No JVD  CHEST/LUNG: mild decrease breath sounds bilaterally; No wheeze   HEART: Irregular rate and rhythm; No murmurs, rubs, or gallops  ABDOMEN: Soft, Nontender, Nondistended; Bowel sounds present  : chronic Adair in place, britney color urine, neg CVAT   Neuro: AAOx1, no focal weakness, mild left UE weakness baseline   EXTREMITIES:  + Peripheral Pulses, No clubbing, cyanosis, + edema, right AKA, left foot dressing d/c/i, + arm edema

## 2022-11-09 NOTE — PROGRESS NOTE ADULT - NSPROGADDITIONALINFOA_GEN_ALL_CORE
d/w pt and RN.  d/w the daughter Nola at bedside.  Hospice planning.       - Dr. SADIE Heller ([facility])  - [phone]

## 2022-11-09 NOTE — PROGRESS NOTE ADULT - ASSESSMENT
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

## 2022-11-09 NOTE — PROGRESS NOTE ADULT - SUBJECTIVE AND OBJECTIVE BOX
CARDIOLOGY FOLLOW UP - Dr. Stephens  Date of Service: 11/9/2022  CC: no events    Review of Systems:  Constitutional: No fever, weight loss, or fatigue  Respiratory: No cough, wheezing, or hemoptysis, no shortness of breath  Cardiovascular: No chest pain, palpitations, passing out, dizziness, or leg swelling  Gastrointestinal: No abd or epigastric pain. No nausea, vomiting, or hematemesis; no diarrhea or consiptaiton, no melena or hematochezia  Vascular: No edema     TELEMETRY:    PHYSICAL EXAM:  T(C): 36.9 (11-09-22 @ 11:24), Max: 36.9 (11-09-22 @ 11:24)  HR: 92 (11-09-22 @ 11:24) (87 - 109)  BP: 114/60 (11-09-22 @ 11:24) (100/50 - 114/60)  RR: 20 (11-09-22 @ 11:24) (20 - 25)  SpO2: 98% (11-09-22 @ 11:24) (90% - 100%)  Wt(kg): --  I&O's Summary      Appearance: Normal	  Cardiovascular: Normal S1 S2,RRR, No JVD, No murmurs  Respiratory: Lungs clear to auscultation	  Gastrointestinal:  Soft, Non-tender, + BS	  Extremities: Normal range of motion, No clubbing, cyanosis or edema  Vascular: Peripheral pulses palpable 2+ bilaterally       Home Medications:  acetaminophen 325 mg oral tablet: 2 tab(s) orally every 6 hours, As needed, Moderate Pain (4 - 6) (23 Sep 2022 16:43)  acetylcysteine 20% inhalation solution: 4 milliliter(s) inhaled 4 times a day as needed (25 Oct 2022 10:37)  acetylcysteine 20% inhalation solution: 4 milliliter(s) inhaled 3 times a day (23 Sep 2022 16:43)  aspirin 81 mg oral delayed release tablet: 1 tab(s) orally once a day (23 Sep 2022 16:43)  Aspirin Enteric Coated 81 mg oral delayed release tablet: 1 tab(s) orally once a day (25 Oct 2022 10:37)  atorvastatin 80 mg oral tablet: 1 tab(s) orally once a day (at bedtime) (23 Sep 2022 16:43)  bacitracin 500 units/g topical ointment: Apply topically to affected area twice to back (25 Oct 2022 10:37)  budesonide 0.5 mg/2 mL inhalation suspension: 0.5 milligram(s) inhaled 2 times a day (14 Sep 2022 19:13)  budesonide 0.5 mg/2 mL inhalation suspension: 2 milliliter(s) inhaled 2 times a day (25 Oct 2022 10:37)  cadexomer iodine 0.9% topical gel: 1 application topically once a day (23 Sep 2022 16:43)  Coumadin 3 mg oral tablet: 1 tab(s) orally once a day (at bedtime) (23 Sep 2022 16:43)  Coumadin 4 mg oral tablet: 1 tab(s) orally once a day (25 Oct 2022 10:37)  finasteride 5 mg oral tablet: 1 tab(s) orally once a day (14 Sep 2022 19:13)  finasteride 5 mg oral tablet: 1 tab(s) orally once a day (25 Oct 2022 10:37)  Flomax 0.4 mg oral capsule: 1 cap(s) orally once a day (25 Oct 2022 10:37)  gabapentin 100 mg oral capsule: 1 tab(s) orally 2 times a day (25 Oct 2022 10:37)  gabapentin 100 mg oral tablet: 1 tab(s) orally 2 times a day (14 Sep 2022 19:13)  guaifenesin-dextromethorphan 100 mg-10 mg/5 mL oral liquid: 10 milliliter(s) orally every 6 hours (23 Sep 2022 16:43)  insulin glargine 100 units/mL subcutaneous solution: 10 unit(s) subcutaneous once a day (at bedtime) (23 Sep 2022 16:43)  insulin lispro 100 units/mL injectable solution: 14 unit(s) injectable once a day before dinner (23 Sep 2022 18:00)  insulin lispro 100 units/mL injectable solution: 14 unit(s) injectable once a day before lunch (23 Sep 2022 18:00)  insulin lispro 100 units/mL injectable solution: 20 unit(s) injectable once a day before Breakfast (23 Sep 2022 18:00)  insulin lispro 100 units/mL subcutaneous solution: 14 unit(s) subcutaneous before lunch and dinner (25 Oct 2022 10:37)  Iodosorb 0.9% topical gel: to heel (25 Oct 2022 10:37)  ipratropium: inhalation as needed (25 Oct 2022 10:37)  ipratropium-albuterol 0.5 mg-2.5 mg/3 mL inhalation solution: 3 milliliter(s) inhaled every 6 hours (23 Sep 2022 16:43)  Lantus 100 units/mL subcutaneous solution: 10 unit(s) subcutaneous once a day (at bedtime) (25 Oct 2022 10:37)  Lasix 20 mg oral tablet: 1 tab(s) orally once a day (25 Oct 2022 10:37)  Lipitor 80 mg oral tablet: 1 tab(s) orally once a day (25 Oct 2022 10:37)  medihoney:  (25 Oct 2022 10:37)  metoprolol tartrate 50 mg oral tablet: 1 tab(s) orally 2 times a day (23 Sep 2022 16:43)  Metoprolol Tartrate 50 mg oral tablet: 1 tab(s) orally 2 times a day (25 Oct 2022 10:37)  montelukast 10 mg oral tablet: 1 tab(s) orally once a day (at bedtime) (23 Sep 2022 16:43)  Multiple Vitamins with Minerals oral tablet: 1 tab(s) orally once a day (23 Sep 2022 16:43)  pantoprazole 40 mg oral delayed release tablet: 1 tab(s) orally once a day (before a meal) (23 Sep 2022 16:43)  polyethylene glycol 3350 oral powder for reconstitution: 17 gram(s) orally once a day (23 Sep 2022 16:43)  predniSONE 50 mg oral tablet: 1 tab(s) orally once a day for one more dose on 9/24/22 then D/C (23 Sep 2022 16:43)  Protonix 40 mg oral delayed release tablet: 1 tab(s) orally once a day (25 Oct 2022 10:37)  senna leaf extract oral tablet: 2 tab(s) orally once a day (at bedtime) (23 Sep 2022 16:43)  Singulair 10 mg oral tablet: 1 tab(s) orally once a day (25 Oct 2022 10:37)  sodium chloride 3% inhalation solution: 4 milliliter(s) inhaled every 12 hours (23 Sep 2022 16:43)  Synthroid 25 mcg (0.025 mg) oral tablet: 1 tab(s) orally once a day (25 Oct 2022 10:37)  Synthroid 25 mcg (0.025 mg) oral tablet: 1 tab(s) orally once a day (14 Sep 2022 19:13)  tamsulosin 0.4 mg oral capsule: 2 cap(s) orally once a day (at bedtime) (14 Sep 2022 19:13)  zinc oxide topical cream: Apply topically to affected area twice to AKA (25 Oct 2022 10:37)        MEDICATIONS  (STANDING):  ALBUTerol    90 MICROgram(s) HFA Inhaler 2 Puff(s) Inhalation every 6 hours  aspirin enteric coated 81 milliGRAM(s) Oral daily  atorvastatin 80 milliGRAM(s) Oral at bedtime  BACItracin   Ointment 1 Application(s) Topical daily  carboxymethylcellulose 0.5% (Preservative-Free) Ophthalmic Solution 1 Drop(s) Both EYES every 3 hours  collagenase Ointment 1 Application(s) Topical daily  finasteride 5 milliGRAM(s) Oral daily  levothyroxine Injectable 20 MICROGram(s) IV Push at bedtime  metoprolol tartrate 50 milliGRAM(s) Oral two times a day  montelukast 10 milliGRAM(s) Oral daily  pantoprazole  Injectable 40 milliGRAM(s) IV Push every 24 hours  polyethylene glycol 3350 17 Gram(s) Oral every 12 hours  tamsulosin 0.4 milliGRAM(s) Oral at bedtime  tiotropium 18 MICROgram(s) Capsule 1 Capsule(s) Inhalation daily        EKG:  RADIOLOGY:  DIAGNOSTIC TESTING:  [ ] Echocardiogram:  [ ] Catherterization:  [ ] Stress Test:  OTHER:     LABS:	 	                  CARDIAC MARKERS:

## 2022-11-10 NOTE — PROGRESS NOTE ADULT - ASSESSMENT
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

## 2022-11-10 NOTE — PROGRESS NOTE ADULT - NSPROGADDITIONALINFOA_GEN_ALL_CORE
d/w pt and RN.  d/w the daughter Nola at bedside.  Hospice planning in progress.  O2 requirements limiting factor.   weaning down O2 as tolerates, currently on 6L NC. if remain stable, likely home with home hospice.    - Dr. SADIE Heller ([facility])  - [phone]

## 2022-11-10 NOTE — PROGRESS NOTE ADULT - SUBJECTIVE AND OBJECTIVE BOX
SUBJECTIVE/ OVERNIGHT EVENTS:  tolerating NC 6L so far  calm  awake but doesn't talk   the daughter Nola at bedside  pt denied pain  no cp, no sob, no n/v/d. no abdominal pain.  no headache, no dizziness.       --------------------------------------------------------------------------------------------  LABS:            CAPILLARY BLOOD GLUCOSE                RADIOLOGY & ADDITIONAL TESTS:    Imaging Personally Reviewed:  [x] YES  [ ] NO    Consultant(s) Notes Reviewed:  [x] YES  [ ] NO    MEDICATIONS  (STANDING):  ALBUTerol    90 MICROgram(s) HFA Inhaler 2 Puff(s) Inhalation every 6 hours  aspirin enteric coated 81 milliGRAM(s) Oral daily  atorvastatin 80 milliGRAM(s) Oral at bedtime  BACItracin   Ointment 1 Application(s) Topical daily  carboxymethylcellulose 0.5% (Preservative-Free) Ophthalmic Solution 1 Drop(s) Both EYES every 3 hours  collagenase Ointment 1 Application(s) Topical daily  finasteride 5 milliGRAM(s) Oral daily  levothyroxine Injectable 20 MICROGram(s) IV Push at bedtime  metoprolol tartrate 50 milliGRAM(s) Oral two times a day  montelukast 10 milliGRAM(s) Oral daily  pantoprazole  Injectable 40 milliGRAM(s) IV Push every 24 hours  polyethylene glycol 3350 17 Gram(s) Oral every 12 hours  tamsulosin 0.4 milliGRAM(s) Oral at bedtime  tiotropium 18 MICROgram(s) Capsule 1 Capsule(s) Inhalation daily    MEDICATIONS  (PRN):  acetaminophen     Tablet .. 650 milliGRAM(s) Oral every 6 hours PRN Mild Pain (1 - 3), Moderate Pain (4 - 6)  sodium chloride 0.65% Nasal 1 Spray(s) Both Nostrils every 4 hours PRN Nasal Congestion      Care Discussed with Consultants/Other Providers [x] YES  [ ] NO    Vital Signs Last 24 Hrs  T(C): --  T(F): --  HR: --  BP: --  BP(mean): --  RR: --  SpO2: --      I&O's Summary    09 Nov 2022 07:01  -  10 Nov 2022 07:00  --------------------------------------------------------  IN: 0 mL / OUT: 1400 mL / NET: -1400 mL        PHYSICAL EXAM:  GENERAL: awake, comfortable, on 6L NC   HEAD:  Atraumatic, Normocephalic  EYES: EOMI, PERRLA, conjunctiva and sclera clear  NECK: Supple, No JVD  CHEST/LUNG: mild decrease breath sounds bilaterally; No wheeze   HEART: Irregular rate and rhythm; No murmurs, rubs, or gallops  ABDOMEN: Soft, Nontender, Nondistended; Bowel sounds present  : chronic Adair in place, britney color urine, neg CVAT   Neuro: AAOx1, no focal weakness, mild left UE weakness baseline   EXTREMITIES:  + Peripheral Pulses, No clubbing, cyanosis, + edema, right AKA, left foot dressing d/c/i, + arm edema

## 2022-11-10 NOTE — PROGRESS NOTE ADULT - PROBLEM SELECTOR PLAN 1
- 2/2 large pleural effusion and pulm vasc congestion  - troponin elevation likely 2/2 CHF exacerbation (stable x 3 sets). card following   - TTE: Severe global left ventricular systolic dysfunction. EF 28%  - tele monitoring: no events. paced rhythm.   - c/w Lasix 40 mg iv BID, has chronic Adair for strict ins and outs.  - pulm consult: Dr. Felton.   - CT surgery on the case for thoracentesis (INR 2.5, procedure canceled)  - vit K PO x 1 for elevated INR in anticipation for thoracentesis, which was done 10/31/22: s/p 1500 cc fluid removed.   - no plans for further imaging or invasive procedures  - poor prognosis, comfort care only  - Advance directives discussed with the daughter Willow HCP requests palliative eval. Consulted.   - The daughter Willow asking for DNR/DNI. wishes that he will be going home with home hospice once he is a little better optimized. d/w palliative team Delmy.  - 88-90% on 40% Hi-Jaylon NCO2  - 99% on 100% NRB  - Home hospice unable to accommodate NRB  - Low-dose IV MSO4 as need for relieving work of breathing is appropriate  - weaning down O2 as tolerates, currently on 6L NC. if remain stable, likely home with home hospice.

## 2022-11-11 NOTE — PROGRESS NOTE ADULT - SUBJECTIVE AND OBJECTIVE BOX
[facility] Geriatrics and Palliative Care  Basia Win Palliative Care Attending  Contact Info: Page [phone] (including Nights/Weekends), message on Microsoft Teams (Basia Win), or leave  at Palliative Office [phone] (non-urgent)     SUBJECTIVE AND OBJECTIVE: Patient seen this AM, no family members at bedside. Patient was awake but no purposeful eye contact or communication. Pt did not appear to be in distress. Per RN, patient does show signs of distress with moaning with repositioning and possible phantom limb pain.     Indication for Geriatrics and Palliative Care Services/INTERVAL HPI: sx management in setting of advanced illness.     OVERNIGHT EVENTS:  > 11/11: Palliative team reconsulted for symptom management.     DNR on chart:Yes    Allergies    No Known Allergies    Intolerances    MEDICATIONS  (STANDING):  ALBUTerol    90 MICROgram(s) HFA Inhaler 2 Puff(s) Inhalation every 6 hours  aspirin enteric coated 81 milliGRAM(s) Oral daily  atorvastatin 80 milliGRAM(s) Oral at bedtime  BACItracin   Ointment 1 Application(s) Topical daily  carboxymethylcellulose 0.5% (Preservative-Free) Ophthalmic Solution 1 Drop(s) Both EYES every 3 hours  collagenase Ointment 1 Application(s) Topical daily  finasteride 5 milliGRAM(s) Oral daily  levothyroxine Injectable 20 MICROGram(s) IV Push at bedtime  metoprolol tartrate 50 milliGRAM(s) Oral two times a day  montelukast 10 milliGRAM(s) Oral daily  pantoprazole  Injectable 40 milliGRAM(s) IV Push every 24 hours  polyethylene glycol 3350 17 Gram(s) Oral every 12 hours  tamsulosin 0.4 milliGRAM(s) Oral at bedtime  tiotropium 18 MICROgram(s) Capsule 1 Capsule(s) Inhalation daily    MEDICATIONS  (PRN):  acetaminophen     Tablet .. 650 milliGRAM(s) Oral every 6 hours PRN Mild Pain (1 - 3), Moderate Pain (4 - 6)  glycopyrrolate Injectable 0.4 milliGRAM(s) IV Push every 6 hours PRN secretions  morphine  - Injectable 2 milliGRAM(s) IV Push every 4 hours PRN Severe Pain (7 - 10)  sodium chloride 0.65% Nasal 1 Spray(s) Both Nostrils every 4 hours PRN Nasal Congestion      ITEMS UNCHECKED ARE NOT PRESENT    PRESENT SYMPTOMS: [ x]Unable to self-report - see [ ] CPOT [ x] PAINADS [ x] RDOS  Source if other than patient:  [ ]Family   [ x]Team     Pain:  [ ]yes [ ]no- See PAIN AD score   QOL impact -   Location -                    Aggravating factors -  Quality -  Radiation -  Timing-  Severity (0-10 scale):  Minimal acceptable level (0-10 scale):     CPOT:    https://www.[facility].org/getattachment/[email or web address]/Critical-Care-Pain-Observation-Tool-(CPOT)    Dyspnea:                           [ ]Mild [ ]Moderate [ ]Severe  Anxiety:                             [ ]Mild [ ]Moderate [ ]Severe  Fatigue:                             [ ]Mild [ ]Moderate [ ]Severe  Nausea:                             [ ]Mild [ ]Moderate [ ]Severe  Loss of appetite:              [ ]Mild [ ]Moderate [ ]Severe  Constipation:                    [ ]Mild [ ]Moderate [ ]Severe  Other Symptoms:  [ x]All other review of systems negative     PCSSQ[Palliative Care Spiritual Screening Question]   Severity (0-10):   Score of 4 or > indicate consideration of Chaplaincy referral.  Chaplaincy Referral: [ ] yes [ ] refused [ ] following [x ] deferred    Caregiver [place]? : [ ] yes [ ] no [ ] Deferred [ ] Declined             Social work referral [x ] Patient & Family Centered Care Referral [ x]  Anticipatory Grief present?:  [ ] yes [ ] no  [x ] Deferred                  Social work referral [ ] Patient & Family Centered Care Referral [ ]      PHYSICAL EXAM:  Vital Signs Last 24 Hrs  T(C): 36.7 (11 Nov 2022 12:15), Max: 36.8 (11 Nov 2022 05:12)  T(F): 98 (11 Nov 2022 12:15), Max: 98.3 (11 Nov 2022 05:12)  HR: 103 (11 Nov 2022 12:15) (89 - 103)  BP: 140/80 (11 Nov 2022 12:15) (106/62 - 140/80)  BP(mean): --  RR: 19 (11 Nov 2022 12:15) (19 - 19)  SpO2: 98% (11 Nov 2022 12:15) (98% - 100%)    Parameters below as of 11 Nov 2022 12:15  Patient On (Oxygen Delivery Method): nasal cannula     I&O's Summary     GENERAL: [ ]Cachexia    [ ]Alert  [ ]Oriented x   [ ]Lethargic  [ x]Unarousable  [ ]Verbal  [ x]Non-Verbal  Behavioral:   [ ]Anxiety  [ ]Delirium [ ]Agitation [ x]Other  HEENT: [place]   [ ]Normal   [x ]Dry mouth   [ ]ET Tube/Trach  [ ]Oral lesions  PULMONARY:   [ ]Clear [ ]Tachypnea  [ ]Audible excessive secretions   [ ]Rhonchi        [ ]Right [ ]Left [ ]Bilateral  [ ]Crackles        [ ]Right [ ]Left [ ]Bilateral  [ ]Wheezing     [ ]Right [ ]Left [ ]Bilateral  [x ]Diminished BS [ ] Right [ ]Left [x ]Bilateral  CARDIOVASCULAR:    [ ]Regular [ ]Irregular [ ]Tachy  [ ][place] [ ]Murmur [ ]Other  GASTROINTESTINAL:  [ x]Soft  [ ]Distended   [ ]+BS  [ x]Non tender [ ]Tender  [ ]Other [ ]PEG [ ]OGT/ NGT   Last BM: 10/10  GENITOURINARY:  [ ]Normal [ ]Incontinent   [ ]Oliguria/Anuria   [ x]Adair  MUSCULOSKELETAL:  right leg amputation   [ ]Normal   [ x]Weakness  [x ]Bed/Wheelchair bound [ ]Edema  NEUROLOGIC:   [ ]No focal deficits  [x ] Cognitive impairment  [ ] Dysphagia [ ]Dysarthria [ ] Paresis [ ]Other   SKIN: Please see flowsheets   [ ]Normal  [ ]Rash  [ ]Other  [ ]Pressure ulcer(s) [ ]y [ ]n present on admission    CRITICAL CARE:  [ ]Shock Present  [ ]Septic [ ]Cardiogenic [ ]Neurologic [ ]Hypovolemic  [ ]Vasopressors [ ]Inotropes  [ ]Respiratory failure present [ ]Mechanical Ventilation [ ]Non-invasive ventilatory support [ ]High-Flow   [ ]Acute  [ ]Chronic [ ]Hypoxic  [ ]Hypercarbic [ ]Other  [ ]Other organ failure     LABS: n/a     RADIOLOGY & ADDITIONAL STUDIES: n/a     Protein Calorie Malnutrition Present: [ ]mild [ ]moderate [ ]severe [ ]underweight [ ]morbid obesity  https://www.andeal.org/vault/2440/web/files/ONC/Table_Clinical%20Characteristics%20to%20Document%20Malnutrition-White%20JV%20et%20al%202012.pdf    Height (cm): 172.7 (10-25-22 @ 01:53), 172.7 (09-14-22 @ 11:55), 172.7 (04-22-22 @ 12:23)  Weight (kg): 89.5 (11-01-22 @ 06:10), 86.2 (09-14-22 @ 11:55), 92.4 (04-22-22 @ 12:23)  BMI (kg/m2): 30 (11-01-22 @ 06:10), 28.9 (10-25-22 @ 01:53), 28.9 (09-14-22 @ 11:55)    [x ]PPSV2 < or = 30%  [ ]significant weight loss [ ]poor nutritional intake [ ]anasarca[ ]Artificial Nutrition    Other REFERRALS:  [x ]Hospice  [ ]Child Life  [ ]Social Work  [ ]Case management [ ]Holistic Therapy

## 2022-11-11 NOTE — PROGRESS NOTE ADULT - PROBLEM SELECTOR PLAN 1
Pt with large pleural effusion and pulmonary congestion 2/2 CHF (EF 28%)   Pt not tolerating PO meds  For dyspnea- started on 2mg IV morphine q2 hr prn respiratory distress   supplemental oxygen as tolerated   Comfort measures only  bedside fan for dyspnea

## 2022-11-11 NOTE — PROGRESS NOTE ADULT - NSPROGADDITIONALINFOA_GEN_ALL_CORE
d/w pt and RN.  d/w the daughter Nola at bedside.  Hospice planning in progress.  O2 requirements limiting factor.   weaning down O2 as tolerates, currently on 6L NC. if remain stable, likely home with home hospice.  overnight back and form NRB?     - Dr. SADIE Heller ([facility])  - [phone]

## 2022-11-11 NOTE — PROGRESS NOTE ADULT - ASSESSMENT
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 Palliative care called for goals of care and advance care planning

## 2022-11-11 NOTE — PROGRESS NOTE ADULT - SUBJECTIVE AND OBJECTIVE BOX
Patient is a 87y old  Male who presents with a chief complaint of Acute hypoxic respiratory failure (10 Nov 2022 18:38)       INTERVAL HPI/OVERNIGHT EVENTS:  Patient seen and evaluated at bedside.  Pt is resting comfortable in NAD. Denies N/V/F/C.  Pain rated at X/10    Allergies    No Known Allergies    Intolerances        Vital Signs Last 24 Hrs  T(C): 36.8 (11 Nov 2022 05:12), Max: 36.8 (11 Nov 2022 05:12)  T(F): 98.3 (11 Nov 2022 05:12), Max: 98.3 (11 Nov 2022 05:12)  HR: 89 (11 Nov 2022 05:12) (89 - 89)  BP: 106/62 (11 Nov 2022 05:12) (106/62 - 106/62)  BP(mean): --  RR: 19 (11 Nov 2022 05:12) (19 - 19)  SpO2: 100% (11 Nov 2022 05:12) (100% - 100%)    Parameters below as of 11 Nov 2022 05:12  Patient On (Oxygen Delivery Method): nasal cannula  O2 Flow (L/min): 6      LABS:              CAPILLARY BLOOD GLUCOSE          Lower Extremity Physical Exam:  s/p RLE BKA amputation  Vasular: DP/PT 0/4, L, CFT < 3 seconds L, Temperature gradient warm to cool, L.   Neuro: Epicritic sensation diminished to the level of the toes, L.  Musculoskeletal/Ortho: R BKA  Skin: punctate posterior heel wound to level of subq with fibrotic base, no undermining or tracking, no purulence or malodor, lateral 5th MPJ punctate wound to level of capsule with fibrotic base, mild periwound erythema, no drainage or clinical signs of infection. Right BKA.    RADIOLOGY & ADDITIONAL TESTS:

## 2022-11-11 NOTE — PROGRESS NOTE ADULT - SUBJECTIVE AND OBJECTIVE BOX
SUBJECTIVE/ OVERNIGHT EVENTS:  appears comfortable  poor historian  awake alert  minimal words  the daughter Willow at bedside trying to feed.   overnight back and form NRB?   currently on NC      --------------------------------------------------------------------------------------------  LABS:            CAPILLARY BLOOD GLUCOSE                RADIOLOGY & ADDITIONAL TESTS:    Imaging Personally Reviewed:  [x] YES  [ ] NO    Consultant(s) Notes Reviewed:  [x] YES  [ ] NO    MEDICATIONS  (STANDING):  ALBUTerol    90 MICROgram(s) HFA Inhaler 2 Puff(s) Inhalation every 6 hours  aspirin enteric coated 81 milliGRAM(s) Oral daily  atorvastatin 80 milliGRAM(s) Oral at bedtime  BACItracin   Ointment 1 Application(s) Topical daily  carboxymethylcellulose 0.5% (Preservative-Free) Ophthalmic Solution 1 Drop(s) Both EYES every 3 hours  collagenase Ointment 1 Application(s) Topical daily  finasteride 5 milliGRAM(s) Oral daily  levothyroxine Injectable 20 MICROGram(s) IV Push at bedtime  metoprolol tartrate 50 milliGRAM(s) Oral two times a day  montelukast 10 milliGRAM(s) Oral daily  pantoprazole  Injectable 40 milliGRAM(s) IV Push every 24 hours  polyethylene glycol 3350 17 Gram(s) Oral every 12 hours  tamsulosin 0.4 milliGRAM(s) Oral at bedtime  tiotropium 18 MICROgram(s) Capsule 1 Capsule(s) Inhalation daily    MEDICATIONS  (PRN):  acetaminophen     Tablet .. 650 milliGRAM(s) Oral every 6 hours PRN Mild Pain (1 - 3), Moderate Pain (4 - 6)  glycopyrrolate Injectable 0.4 milliGRAM(s) IV Push every 6 hours PRN secretions  morphine  - Injectable 2 milliGRAM(s) IV Push every 4 hours PRN Severe Pain (7 - 10)  morphine  - Injectable 2 milliGRAM(s) IV Push every 2 hours PRN respiratory distress  sodium chloride 0.65% Nasal 1 Spray(s) Both Nostrils every 4 hours PRN Nasal Congestion      Care Discussed with Consultants/Other Providers [x] YES  [ ] NO    Vital Signs Last 24 Hrs  T(C): 36.7 (11 Nov 2022 12:15), Max: 36.8 (11 Nov 2022 05:12)  T(F): 98 (11 Nov 2022 12:15), Max: 98.3 (11 Nov 2022 05:12)  HR: 103 (11 Nov 2022 12:15) (89 - 103)  BP: 140/80 (11 Nov 2022 12:15) (106/62 - 140/80)  BP(mean): --  RR: 19 (11 Nov 2022 12:15) (19 - 19)  SpO2: 98% (11 Nov 2022 12:15) (98% - 100%)    Parameters below as of 11 Nov 2022 12:15  Patient On (Oxygen Delivery Method): nasal cannula      I&O's Summary        PHYSICAL EXAM:  GENERAL: awake, comfortable, on 6L NC   HEAD:  Atraumatic, Normocephalic  EYES: EOMI, PERRLA, conjunctiva and sclera clear  NECK: Supple, No JVD  CHEST/LUNG: mild decrease breath sounds bilaterally; No wheeze   HEART: Irregular rate and rhythm; No murmurs, rubs, or gallops  ABDOMEN: Soft, Nontender, Nondistended; Bowel sounds present  : chronic [place] in place, britney color urine, neg CVAT   Neuro: AAOx1, no focal weakness, mild left UE weakness baseline   EXTREMITIES:  + Peripheral Pulses, No clubbing, cyanosis, + edema, right AKA, left foot dressing d/c/i, + arm edema

## 2022-11-11 NOTE — PROGRESS NOTE ADULT - CONVERSATION DETAILS
Palliative provider spoke with patient's daughter, Willow over the phone. Explained that palliative team was reconsulted to assist with symptom management and ongoing goc discussions. Willow stated that family is aware that patient's clinical condition has declined since being hospitalized and patient no longer can safely swallow PO meds. She was in agreement with symptom medications via IV access. Though family's goal was initially for home hospice, explained that patient may need inpatient hospice transition if he is requiring IV symptom medications, however patient's needs will be continually reassessed. Willow was understanding of this information. Emotional support provided.

## 2022-11-11 NOTE — PROGRESS NOTE ADULT - ASSESSMENT
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

## 2022-11-11 NOTE — PROGRESS NOTE ADULT - ASSESSMENT
88 y/o M w/ left foot wounds  - Pt seen and evaluated  - Afebrile, no leukocytosis  - Punctate posterior heel wound to level of subq with fibrotic base, no undermining or tracking, no purulence or malodor, lateral 5th MPJ punctate wound to level of capsule with fibrotic base, mild periwound erythema, no drainage or clinical signs of infection. Right BKA.  - No wound culture obtained as it would likely yield skin contaminant  - L foot XR: No OM, no tracking soft tissue air  - Offload L heel with ZFLOW boot at all times  - Pod plan LWC  - Per daughter, pt and family are in agreement w/ vasc (Dr. Quinn) to not pursue any further intervention for revascularization of LLE, will forego ALMAZ/PVR at this time in the absence of tripp gangrene/ischemia  - Will follow

## 2022-11-12 NOTE — PROGRESS NOTE ADULT - NSPROGADDITIONALINFOA_GEN_ALL_CORE
d/w pt and RN.  d/w the daughter Nola at bedside.  Hospice planning in progress.  O2 requirements limiting factor.   weaning down O2 as tolerates, stable briefly on 6L, however, now back on NRB.  in active stage of dying  c/w IV Morphine PRN  d/w the wife at bedside. All questions answered.    - Dr[facility] S[facility] ([facility])  - [phone]

## 2022-11-12 NOTE — PROGRESS NOTE ADULT - PROBLEM SELECTOR PROBLEM 5
Hypothyroid
Hypothyroid
Pleural effusion
Hypothyroid
Encounter for palliative care
Hypothyroid
Pleural effusion
Hypothyroid

## 2022-11-12 NOTE — PROGRESS NOTE ADULT - PROBLEM SELECTOR PLAN 7
-has chronic hudson  -can c/w flomax and finasteride
per primary team
per primary team
-has chronic hudson  -can c/w flomax and finasteride
-has chronic Adair  -can c/w Flomax and finasteride
-has chronic hudson  -can c/w flomax and finasteride

## 2022-11-12 NOTE — PROGRESS NOTE ADULT - ASSESSMENT
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

## 2022-11-12 NOTE — PROGRESS NOTE ADULT - PROBLEM SELECTOR PROBLEM 8
BPH (benign prostatic hyperplasia)
Nutrition, metabolism, and development symptoms
BPH (benign prostatic hyperplasia)
Nutrition, metabolism, and development symptoms

## 2022-11-12 NOTE — PROGRESS NOTE ADULT - PROBLEM SELECTOR PLAN 2
- no wheezing  - c/w duonebs Q6 prn wheezing  - per ED no wheezing earlier either, but crackles  - diuretics as above
10/29: still has large effusion on left side:  awaiting procedure on left side    10/30: seems OK : stephen any sob:  but he looks sob to me:  awaiting pl fluid drainage in am    10/31: drained about 1500 cc of fluid:    11/1: he has lot of phlegm in his chest which he isnot able to cough  out: start chest vest; chest apt AGGRESSIVE AND USE BiPAP FOR WOB:  IF HE CANT USE BIP : USE HIGH FLOW AND INCENTIVE SPIROMETRY: HIS CHEST X-RAY WITH ATELECTASIS  : NO PTX : HE HEEDS AGGRESSIVE CHEST TOILET OTHER ISE THERE IS A RISK OF HIM GETTING INTUBATED:    11/2: little better then yesterday : still on chest vest: his ABG from yesterday with reasonable ph:  and 59 pco2: he has pretty bad ef:  and his chest xray is suggestive of chf: cont iv steroids today too with BD and decrease to 20 mg   q 12 hours from am  : use bipap prn as needed and full time at night time:    11/3: seems to be doing very poorly:  dnr and dni : cont current management:    11/4:   seems alert and awake:  on 100% NRB can start bipap to see if it opens up the lug:  ? pl effusion vas mucus plugging : haley mai:  ct scan is agreeable:  and they would agree with pleurax : he is DNR and     11/5: he is alert and awake  ; and responding to questions : on 100% NRB ; change to nasal cannula or high flow:  change to po prednisone    11/6"  : cont prednisone for now
cont bd : no wheezing : abg norted: with chr co2 retention    10/27: cont BD: bipap prn as required with increasing work of breathing 1
cont bd : no wheezing : abg norted: with chr co2 retention    10/27: cont BD: bipap prn as required with increasing work of breathing 1    10/28: e seems to e comfortable : no sob:  he seem to have chr compensated co2 retention:    10/29: no deterioration in his resp status    10/30: seems stable:  cont o2: and nebs:
Per RN, patient with intermittent signs of distress possibly from phantom limb   > Started on 2mg IV morphine q4h prn   > bowel regimen while on opioids- dulcolax suppository 10mg qd prn
cont bd : no wheezing : abg nored: wth chr co2 retention
- no wheezing  - c/w duonebs Q6 prn wheezing  - per ED no wheezing earlier either, but crackles  - diuretics as above    # RRT for increased work of breathing, hypoxia, placed on bipap but refusing.  seen by pulm, chest vest ordered. steroids started empirically.   Cont trial of hi-flow.   Advance directives discussed with the daughter Willow HCP requests palliative eval. Consulted.   The daughter Willow asking for DNR/DNI. wishes that he will be going home with home hospice once he is a little better optimized. d/w palliative team Delmy.   Diuretics dose increased. Extra Lasix IV x 1 given.
-no wheezing  -can do duonebs Q6 prn wheezing  -per ED no wheezing earlier either, but crackles
cont bd : no wheezing : abg noted: with chr co2 retention    10/27: cont BD: bipap prn as required with increasing work of breathing 1    10/28: e seems to e comfortable : no sob:  he seem to have chr compensated co2 retention:    10/29: no deterioration in his resp status    10/30: seems stable:  cont o2: and nebs:    10/31: seems stable:  s/p thoracentesis : ? transudative effusion: check serum LDH and serum proteins:
- no wheezing  - c/w duonebs Q6 prn wheezing  - per ED no wheezing earlier either, but crackles  - diuretics as above    # RRT for increased work of breathing, hypoxia, placed on bipap but refusing.  seen by pulm, chest vest ordered. steroids started empirically.   if refusing bipap, then trial of hi-flow.   Advance directives discussed with the daughter Willow HCP requests palliative eval. Consulted.   The daughter Willow asking for DNR/DNI. wishes that he will be going home with home hospice once he is a little better optimized. d/w palliative team Delmy.   Diuretics dose increased. Extra Lasix IV x 1 given.
- no wheezing  - c/w duonebs Q6 prn wheezing  - per ED no wheezing earlier either, but crackles  - diuretics as above
-no wheezing  -can do duonebs Q6 prn wheezing  -per ED no wheezing earlier either, but crackles  - diuretics as above
cont bd : no wheezing : abg noted: with chr co2 retention    10/27: cont BD: bipap prn as required with increasing work of breathing 1    10/28: e seems to e comfortable : no sob:  he seem to have chr compensated co2 retention:    10/29: no deterioration in his resp status    10/30: seems stable:  cont o2: and nebs:    10/31: seems stable:  s/p thoracentesis : ? transudative effusion: check serum LDH and serum proteins:    11/1: CONT bd: : ADD STEORIDS; to help in expectorating phlegm    11/2: cont chest vest:  steroids and BD:
- no wheezing  - can do duonebs Q6 prn wheezing  - per ED no wheezing earlier either, but crackles  - diuretics as above
he is left lung is white out:  and 5 L of oxygen  : no invasive intervention per family  : now comfort care
cont bd : no wheezing : abg noted: with chr co2 retention    10/27: cont BD: bipap prn as required with increasing work of breathing 1    10/28: e seems to e comfortable : no sob:  he seem to have chr compensated co2 retention:    10/29: no deterioration in his resp status    10/30: seems stable:  cont o2: and nebs:    10/31: seems stable:  s/p thoracentesis : ? transudative effusion: check serum LDH and serum proteins:    11/1: CONT bd: : ADD STEORIDS; to help in expectorating phlegm    11/2: cont chest vest:  steroids and BD:    11/3: cont steroids for today too ; cont BD: decrease tomorrow the dose of steroids    11/04: seems OK : no obvious resp distress: on 100% NRB : wean down oxygen
cont bd : no wheezing : abg noted: with chr co2 retention    10/27: cont BD: bipap prn as required with increasing work of breathing 1    10/28: e seems to e comfortable : no sob:  he seem to have chr compensated co2 retention:    10/29: no deterioration in his resp status    10/30: seems stable:  cont o2: and nebs:    10/31: seems stable:  s/p thoracentesis : ? transudative effusion: check serum LDH and serum proteins:    11/1: CONT bd: : ADD STEORIDS; to help in expectorating phlegm    11/2: cont chest vest:  steroids and BD:    11/3: cont steroids for today too ; cont BD: decrease tomorrow the dose of steroids
- no wheezing  - c/w duonebs Q6 prn wheezing  - per ED no wheezing earlier either, but crackles  - diuretics as above
-no wheezing  -can do duonebs Q6 prn wheezing  -per ED no wheezing earlier either, but crackles  - diuretics as above
cont bd : no wheezing : abg noted: with chr co2 retention    10/27: cont BD: bipap prn as required with increasing work of breathing 1    10/28: e seems to e comfortable : no sob:  he seem to have chr compensated co2 retention:    10/29: no deterioration in his resp status    10/30: seems stable:  cont o2: and nebs:    10/31: seems stable:  s/p thoracentesis : ? transudative effusion: check serum LDH and serum proteins:    11/1: CONT bd: : ADD STEORIDS; to help in expectorating phlegm    11/2: cont chest vest:  steroids and BD:    11/3: cont steroids for today too ; cont BD: decrease tomorrow the dose of steroids    11/04: seems OK : no obvious resp distress: on 100% NRB : wean down oxygen
- no wheezing  - c/w duonebs Q6 prn wheezing  - per ED no wheezing earlier either, but crackles  - diuretics as above    # RRT for increased work of breathing, hypoxia, placed on bipap but refusing.  seen by pulm, chest vest ordered. steroids started empirically.   Cont %  Advance directives discussed with the daughter Willow HCP requests palliative eval. Consulted.   The daughter Willow asking for DNR/DNI. wishes that he will be going home with home hospice once he is a little better optimized. d/w palliative team Delmy.   Diuretics dose increased. Extra Lasix IV x 1 given.
- no wheezing  - c/w duonebs Q6 prn wheezing  - per ED no wheezing earlier either, but crackles  - diuretics as above
- no wheezing  - c/w duonebs Q6 prn wheezing  - per ED no wheezing earlier either, but crackles  - diuretics as above    # RRT for increased work of breathing, hypoxia, placed on bipap but refusing.  seen by pulm, chest vest ordered. steroids started empirically.   Cont trial of hi-flow.   Advance directives discussed with the daughter Willow HCP requests palliative eval. Consulted.   The daughter Willow asking for DNR/DNI. wishes that he will be going home with home hospice once he is a little better optimized. d/w palliative team Delmy.   Diuretics dose increased. Extra Lasix IV x 1 given.
cont bd : no wheezing : abg norted: with chr co2 retention    10/27: cont BD: bipap prn as required with increasing work of breathing 1    10/28: e seems to e comfortable : no sob:  he seem to have chr compensated co2 retention:    10/29: no deterioration in his resp status
- no wheezing  - c/w duonebs Q6 prn wheezing  - per ED no wheezing earlier either, but crackles  - diuretics as above    # RRT for increased work of breathing, hypoxia, placed on bipap but refusing.  seen by pulm, chest vest ordered. steroids started empirically.   Cont %  Advance directives discussed with the daughter Willow HCP requests palliative eval. Consulted.   The daughter Willow asking for DNR/DNI. wishes that he will be going home with home hospice once he is a little better optimized. d/w palliative team Delmy.   Diuretics dose increased. Extra Lasix IV x 1 given.
cont bd : no wheezing : abg noted: with chr co2 retention    10/27: cont BD: bipap prn as required with increasing work of breathing 1    10/28: e seems to e comfortable : no sob:  he seem to have chr compensated co2 retention:    10/29: no deterioration in his resp status    10/30: seems stable:  cont o2: and nebs:    10/31: seems stable:  s/p thoracentesis : ? transudative effusion: check serum LDH and serum proteins:    11/1: CONT bd: : ADD STEORIDS; to help in expectorating phlegm
cont bd : no wheezing : abg norted: with chr co2 retention    10/27: cont BD: bipap prn as required with increasing work of breathing 1    10/28: e seems to e comfortable : no sob:  he seem to have chr compensated co2 retention:
- no wheezing  - c/w duonebs Q6 prn wheezing  - per ED no wheezing earlier either, but crackles  - diuretics as above    # RRT for increased work of breathing, hypoxia, placed on bipap but refusing.  seen by pulm, chest vest ordered. steroids started empirically.   Cont %  Advance directives discussed with the daughter Willow HCP requests palliative eval. Consulted.   The daughter Willow asking for DNR/DNI. wishes that he will be going home with home hospice once he is a little better optimized. d/w palliative team Delmy.   Diuretics dose increased. Extra Lasix IV x 1 given.
-no wheezing  -can do duonebs Q6 prn wheezing  -per ED no wheezing earlier either, but crackles

## 2022-11-12 NOTE — PROGRESS NOTE ADULT - PROBLEM SELECTOR PROBLEM 1
Acute respiratory failure with hypoxia
2019 novel coronavirus disease (COVID-19)
Acute respiratory failure with hypoxia
2019 novel coronavirus disease (COVID-19)
Acute respiratory failure with hypoxia

## 2022-11-12 NOTE — PROGRESS NOTE ADULT - PROBLEM SELECTOR PLAN 5
-c/w synthroid while pt is NPO can do 18.75 mcg IV  -TSH 1.66
on levothyroxine
on levothyroxine
-c/w synthroid while pt is NPO can do 18.75 mcg IV  -TSH 1.66
-c/w synthroid while pt is NPO can do 18.75 mcg IV  -TSH 1.66  - can switch back to PO
on levothyroxine
on levothyroxine
-c/w synthroid while pt is NPO can do 18.75 mcg IV  -TSH 1.66
-c/w synthroid while pt is NPO can do 18.75 mcg IV  -TSH 1.66  - can switch back to PO
-c/w synthroid while pt is NPO can do 18.75 mcg IV  -TSH 1.66
-c/w synthroid while pt is NPO can do 18.75 mcg IV  -TSH 1.66  - can switch back to PO
-c/w synthroid while pt is NPO can do 18.75 mcg IV  -TSH 1.66
on levothyroxine
on levothyroxine
on levotyroxine
-c/w synthroid while pt is NPO can do 18.75 mcg IV  -TSH 1.66
on levothyroxine
on levothyroxine
-c/w synthroid while pt is NPO can do 18.75 mcg IV  -TSH 1.66  - can switch back to PO
-c/w synthroid while pt is NPO can do 18.75 mcg IV  -TSH 1.66
-c/w synthroid while pt is NPO can do 18.75 mcg IV  -TSH 1.66
-c/w synthroid while pt is NPO can do 18.75 mcg IV  -TSH 1.66  - can switch back to PO
on levothyroxine
on levothyroxine
his pleural effusion are transudative in character by protein criteria and LDH  ratio suggest  Exudative:  cytology  : is negative    11/05: ct scan chest reviewed: has effusion / atelectasis:    11/6:  seems stable:    11/07: as above: no intervention
-c/w synthroid while pt is NPO can do 18.75 mcg IV  -TSH 1.66  - can switch back to PO
Thank you for allowing us to participate in your patient's care. We will continue to follow with you. Please page [phone] for any q's or c's.     Basia Win D.O.   Palliative Medicine
-c/w synthroid while pt is NPO can do 18.75 mcg IV  -TSH 1.66
his pleural effusion are transudative in character by protein criteria and LDH  ratio suggest  Exudative:  cytology  : is negative    11/05: ct scan chest reviewed: has effusion / atelctasis:    11/6:  seems stable:

## 2022-11-12 NOTE — PROGRESS NOTE ADULT - REASON FOR ADMISSION
Acute hypoxic respiratory failure

## 2022-11-12 NOTE — PROGRESS NOTE ADULT - PROBLEM SELECTOR PROBLEM 7
BPH (benign prostatic hyperplasia)
H/O: CVA (cerebrovascular accident)
BPH (benign prostatic hyperplasia)
H/O: CVA (cerebrovascular accident)
BPH (benign prostatic hyperplasia)

## 2022-11-12 NOTE — PROGRESS NOTE ADULT - PROBLEM SELECTOR PLAN 6
per primary team
-c/w asp/lipitor
per primary team
-c/w asp/lipitor
-c/w asp/lipitor
-c/w asp/lipitor once pt is able to tolerate oral intake
-c/w asp/lipitor
per primary team
on levothyroxine
-c/w asp/lipitor once pt is able to tolerate oral intake
-c/w asp/lipitor
-c/w asp/lipitor once pt is able to tolerate oral intake
on levothyroxine
per primary team
-c/w asp/lipitor
per primary team
per primary team
-c/w asp/lipitor
-c/w asp/lipitor
-c/w asp/lipitor once pt is able to tolerate oral intake
per primary team
per primary team
-c/w asp/lipitor
-c/w asp/lipitor
-c/w asp/lipitor once pt is able to tolerate oral intake
-c/w asp/lipitor
per primary team
per primary team
-c/w asp/lipitor
per primary team

## 2022-11-12 NOTE — PROGRESS NOTE ADULT - PROBLEM SELECTOR PROBLEM 4
Pleural effusion
Chronic atrial fibrillation
Pleural effusion
Chronic atrial fibrillation
Pleural effusion
ACP (advance care planning)
Pleural effusion

## 2022-11-12 NOTE — PROGRESS NOTE ADULT - PROVIDER SPECIALTY LIST ADULT
Cardiology
Cardiology
Nephrology
Thoracic Surgery
Cardiology
Cardiology
Podiatry
Pulmonology
Pulmonology
Thoracic Surgery
Cardiology
Internal Medicine
Internal Medicine
Nephrology
Podiatry
Cardiology
Nephrology
Nephrology
Internal Medicine
Pulmonology
Internal Medicine
Pulmonology
Pulmonology
Internal Medicine
Pulmonology
Internal Medicine
Pulmonology
Pulmonology
Internal Medicine
Pulmonology
Pulmonology
Internal Medicine
Pulmonology
Internal Medicine
Palliative Care

## 2022-11-12 NOTE — PROGRESS NOTE ADULT - PROBLEM SELECTOR PROBLEM 6
H/O: CVA (cerebrovascular accident)
Hypothyroid
H/O: CVA (cerebrovascular accident)
Hypothyroid
H/O: CVA (cerebrovascular accident)

## 2022-11-12 NOTE — PROGRESS NOTE ADULT - PROBLEM SELECTOR PLAN 8
off bipap --> weaned to nasal canula.   seen by speech, pureed diet with mod thick liquid recommended    # Hypernatremia  Na improving, s/p D5W per renal.   diuretic also on hold  now overloaded, so diuretics IV started as above.   renal f/u appreciated.
off bipap --> weaned to nasal canula.   seen by speech, pureed diet with mod thick liquid recommended    # Hypernatremia  Na improving, s/p D5W per renal.   diuretic also on hold  renal f/u appreciated.  comfort care measure at this point, so no further labwork
off bipap --> weaned to nasal canula.   seen by speech, pureed diet with mod thick liquid recommended    # Hypernatremia  Na improving, s/p D5W per renal.   diuretic also on hold  renal f/u appreciated.  comfort care measure at this point, so no further labwork
off bipap --> weaned to nasal canula.   seen by speech, pureed diet with mod thick liquid recommended    # Hypernatremia  Na improving, s/p D5W per renal.   diuretic also on hold  renal f/u appreciated.
off bipap --> weaned to nasal canula.   seen by speech, pureed diet with mod thick liquid recommended
off bipap --> weaned to nasal canula.   seen by speech, pureed diet with mod thick liquid recommended    # Hypernatremia  Na improving, s/p D5W per renal.   diuretic also on hold  renal f/u appreciated.  comfort care measure at this point, so no further labwork
off bipap --> weaned to nasal canula.   seen by speech, pureed diet with mod thick liquid recommended    # Hypernatremia  Na improving, s/p D5W per renal.   diuretic also on hold  now overloaded, so diuretics IV started as above.   renal f/u appreciated.
off bipap --> weaned to nasal canula.   seen by speech, pureed diet with mod thick liquid recommended    # Hypernatremia  Na improving, s/p D5W per renal.   diuretic also on hold  renal f/u appreciated.
off bipap --> wean to nasal canula.   seen by speech, pureed diet with mod thick liquid recommended
off bipap --> weaned to nasal canula.   seen by speech, pureed diet with mod thick liquid recommended    # Hypernatremia  Na improving, s/p D5W per renal.   diuretic also on hold  renal f/u appreciated.  comfort care measure at this point, so no further labwork
off bipap --> wean to nasal canula.   seen by speech, pureed diet with mod thick liquid recommended
off bipap --> weaned to nasal canula.   seen by speech, pureed diet with mod thick liquid recommended    # Hypernatremia  Na improving, s/p D5W per renal.   diuretic also on hold  renal f/u appreciated.
off bipap --> weaned to nasal canula.   seen by speech, pureed diet with mod thick liquid recommended    # Hypernatremia  Na improving, s/p D5W per renal.   diuretic also on hold  now overloaded, so diuretics IV started as above.   renal f/u appreciated.
off bipap --> weaned to nasal canula.   seen by speech, pureed diet with mod thick liquid recommended    # Hypernatremia  Na improving, s/p D5W per renal.   diuretic also on hold  renal f/u appreciated.  comfort care measure at this point, so no further labwork
off bipap --> wean to nasal canula.   seen by speech, pureed diet with mod thick liquid recommended
off bipap --> weaned to nasal canula.   seen by speech, pureed diet with mod thick liquid recommended
off bipap --> weaned to nasal canula.   seen by speech, pureed diet with mod thick liquid recommended    # Hypernatremia  Na improving, s/p D5W per renal.   diuretic also on hold  renal f/u appreciated.  comfort care measure at this point, so no further labwork

## 2022-11-12 NOTE — PROGRESS NOTE ADULT - SUBJECTIVE AND OBJECTIVE BOX
SUBJECTIVE/ OVERNIGHT EVENTS:  on Nonrebreather   appears comfortable  the wife at bedside.  in active stage of terminal dying  on IV morphine as needed.       --------------------------------------------------------------------------------------------  LABS:            CAPILLARY BLOOD GLUCOSE                RADIOLOGY & ADDITIONAL TESTS:    Imaging Personally Reviewed:  [x] YES  [ ] NO    Consultant(s) Notes Reviewed:  [x] YES  [ ] NO    MEDICATIONS  (STANDING):  ALBUTerol    90 MICROgram(s) HFA Inhaler 2 Puff(s) Inhalation every 6 hours  aspirin enteric coated 81 milliGRAM(s) Oral daily  atorvastatin 80 milliGRAM(s) Oral at bedtime  BACItracin   Ointment 1 Application(s) Topical daily  carboxymethylcellulose 0.5% (Preservative-Free) Ophthalmic Solution 1 Drop(s) Both EYES every 3 hours  collagenase Ointment 1 Application(s) Topical daily  finasteride 5 milliGRAM(s) Oral daily  levothyroxine Injectable 20 MICROGram(s) IV Push at bedtime  metoprolol tartrate 50 milliGRAM(s) Oral two times a day  montelukast 10 milliGRAM(s) Oral daily  pantoprazole  Injectable 40 milliGRAM(s) IV Push every 24 hours  polyethylene glycol 3350 17 Gram(s) Oral every 12 hours  tamsulosin 0.4 milliGRAM(s) Oral at bedtime  tiotropium 18 MICROgram(s) Capsule 1 Capsule(s) Inhalation daily    MEDICATIONS  (PRN):  acetaminophen     Tablet .. 650 milliGRAM(s) Oral every 6 hours PRN Mild Pain (1 - 3), Moderate Pain (4 - 6)  glycopyrrolate Injectable 0.4 milliGRAM(s) IV Push every 6 hours PRN secretions  morphine  - Injectable 2 milliGRAM(s) IV Push every 4 hours PRN Severe Pain (7 - 10)  morphine  - Injectable 2 milliGRAM(s) IV Push every 2 hours PRN respiratory distress  sodium chloride 0.65% Nasal 1 Spray(s) Both Nostrils every 4 hours PRN Nasal Congestion      Care Discussed with Consultants/Other Providers [x] YES  [ ] NO    Vital Signs Last 24 Hrs  T(C): 36.8 (12 Nov 2022 11:06), Max: 36.8 (12 Nov 2022 11:06)  T(F): 98.2 (12 Nov 2022 11:06), Max: 98.2 (12 Nov 2022 11:06)  HR: 114 (12 Nov 2022 13:33) (92 - 118)  BP: 143/70 (12 Nov 2022 13:33) (102/62 - 143/70)  BP(mean): --  RR: 19 (12 Nov 2022 13:33) (18 - 19)  SpO2: 97% (12 Nov 2022 13:33) (96% - 98%)    Parameters below as of 12 Nov 2022 13:33  Patient On (Oxygen Delivery Method): mask, nonrebreather  O2 Flow (L/min): 15    I&O's Summary        PHYSICAL EXAM:  GENERAL: awake, comfortable, on 6L NC --> NRB  HEAD:  Atraumatic, Normocephalic  EYES: EOMI, PERRLA, conjunctiva and sclera clear  NECK: Supple, No JVD  CHEST/LUNG: mild decrease breath sounds bilaterally; No wheeze   HEART: Irregular rate and rhythm; No murmurs, rubs, or gallops  ABDOMEN: Soft, Nontender, Nondistended; Bowel sounds present  : chronic [place] in place, britney color urine, neg CVAT   Neuro: AAOx1, no focal weakness, mild left UE weakness baseline   EXTREMITIES:  + Peripheral Pulses, No clubbing, cyanosis, + edema, right AKA, left foot dressing d/c/i, + arm edema

## 2022-11-12 NOTE — PROGRESS NOTE ADULT - PROBLEM SELECTOR PLAN 4
-large pleural effusion left sided, has had effusions prior w/ thoracentesis 2/2 CHF  -pulm eval, s/p thoracentesis as above  -procal low at 0.26 on admission  -comfort care measures
for drainage again now:    10/27: not drained  yet:  for or on Monday : cont diuresis : he is on  4  of oxygen    10/28: cont diuresis :plan to drain on Monday
-large pleural effusion left sided, has had effusions prior w/ thoracentesis 2/2 CHF  -pulm eval, s/p thoracentesis as above  -procal low at 0.26 on admission  - repeat ordered given respiratory decompensation.
-large pleural effusion left sided, has had effusions prior w/ thoracentesis 2/2 CHF  -pulm eval  -procal low at 0.26  - plan for thoracentesis as above
for drainage again now:    10/27: not drained  yet:  for or on Monday : cont diuresis : he is on  4  of oxygen    10/28: cont diuresis :plan to drain on Monday    10/29: still with large effusion on left side"    10/60: repeat chest xray  in am
-large pleural effusion left sided, has had effusions prior w/ thoracentesis 2/2 CHF  -pulm eval  -procal low at 0.26  -plan for thoracentesis as above
-large pleural effusion left sided, has had effusions prior w/ thoracentesis 2/2 CHF  -pulm eval  -procal low at 0.26  -plan for thoracentesis as above
-large pleural effusion left sided, has had effusions prior w/ thoracentesis 2/2 CHF  -pulm eval, s/p thoracentesis as above  -procal low at 0.26 on admission  - repeat ordered given respiratory decompensation.
-large pleural effusion left sided, has had effusions prior w/ thoracentesis 2/2 CHF  -pulm eval, s/p thoracentesis as above  -procal low at 0.26 on admission  -comfort care measures
-large pleural effusion left sided, has had effusions prior w/ thoracentesis 2/2 CHF  -pulm eval, s/p thoracentesis as above  -procal low at 0.26 on admission  - repeat ordered given respiratory decompensation.
-large pleural effusion left sided, has had effusions prior w/ thoracentesis 2/2 CHF  -pulm eval, s/p thoracentesis as above  -procal low at 0.26 on admission  - repeat ordered given respiratory decompensation.
for drainage again now:    10/27: not drained  yet:  for or on Monday : cont diuresis : he is on  4  of oxygen
his pleural effusion are transudative in character by protein criteria and LDH  ratio suggest  Exudative:  cytology  : isnegative
-large pleural effusion left sided, has had effusions prior w/ thoracentesis 2/2 CHF  -pulm eval, s/p thoracentesis as above  -procal low at 0.26 on admission  -comfort care measures
-large pleural effusion left sided, has had effusions prior w/ thoracentesis 2/2 CHF  -pulm eval, s/p thoracentesis as above  -procal low at 0.26 on admission  -comfort care measures
for drainage again now:    10/27: not drained  yet:  for or on Monday : cont diuresis : he is on  4  of oxygen    10/28: cont diuresis :plan to drain on Monday    10/29: still with large effusion on left side"    10/30: repeat chest xray  in am    10/31 : drained 1500 left side:  seems transudative effusion    1/11: drained: serum LDH and s proteins not sent: dw rn
cont lovenox  11/5: on ac  11/6 : on loveox:
for drainage again now:    10/27: not drained  yet:  for or on Monday : cont diuresis : he is on  4  of oxygen    10/28: cont diuresis :plan to drain on Monday    10/29: still with large effusion on left side"    10/30: repeat chest xray  in am    10/31 : drained 1500 left side:  seems transudative effusion
his pleural effusion are transudative in character by protein criteria and LDH  ratio suggest  Exudative:  await cytology  :
-large pleural effusion left sided, has had effusions prior w/ thoracentesis 2/2 CHF  -pulm eval  -procal low at 0.26  -s/p thoracentesis as above
-large pleural effusion left sided, has had effusions prior w/ thoracentesis 2/2 CHF  -pulm eval, s/p thoracentesis as above  -procal low at 0.26 on admission  -comfort care measures
cont lovenox  11/5: on ac  11/6 : on loveox:
-large pleural effusion left sided, has had effusions prior w/ thoracentesis 2/2 CHF  -pulm eval, s/p thoracentesis as above  -procal low at 0.26 on admission  - repeat ordered given respiratory decompensation.
-large pleural effusion left sided, has had effusions prior w/ thoracentesis 2/2 CHF  -pulm eval, s/p thoracentesis as above  -procal low at 0.26 on admission  -comfort care measures
DNR/DNI, MOLST completed in chart by primary team.   Please see attached goc note regarding hospice discussion with patient's daughter.
his pleural effusion are transudative in character by protein criteria and LDH  ratio suggest  Exudative:  cytology  : is negative
his pleural effusion are transudative in character by protein criteria and LDH  ratio suggest  Exudative:  cytology  : is negative    11/05: ct scan chest reviewed: has effusion / atelctasis:
for drainage again now:
-large pleural effusion left sided, has had effusions prior w/ thoracentesis 2/2 CHF  -pulm eval, s/p thoracentesis as above  -procal low at 0.26 on admission  - repeat ordered given respiratory decompensation.
for drainage again now:    10/27: not drained  yet:  for or on Monday : cont diuresis : he is on  4  of oxygen    10/28: cont diuresis :plan to drain on Monday    10/29: still with large effusion on left side"
-large pleural effusion left sided, has had effusions prior w/ thoracentesis 2/2 CHF  -pulm eval  -procal low at 0.26  - plan for thoracentesis as above

## 2022-11-12 NOTE — PROGRESS NOTE ADULT - PROBLEM SELECTOR PROBLEM 3
Chronic atrial fibrillation
Debility
Chronic atrial fibrillation
COPD, severe
Chronic atrial fibrillation
COPD, severe
Chronic atrial fibrillation

## 2022-11-12 NOTE — PROGRESS NOTE ADULT - PROBLEM SELECTOR PROBLEM 2
COPD, severe
Acute pain
COPD, severe
Acute respiratory failure with hypoxia
COPD, severe
Acute respiratory failure with hypoxia

## 2022-11-12 NOTE — PROGRESS NOTE ADULT - PROBLEM SELECTOR PLAN 3
- Lovenox SQ BID once INR < 2  - hold coumadin for now for thoracentesis in am    #Anemia  -Hg 8.2   -no melena or hematochezia  -iron panel: ACD and iron low sats  -IV Iron sucrose x 1 (10/29/22)
- on Lovenox SQ BID   - holding coumadin for now.     #Anemia  -Hg 8.2   -no melena or hematochezia  -iron panel: ACD and iron low sats  -IV Iron sucrose x 1 (10/29/22) and (10/31/22)
- on Lovenox SQ BID   - holding coumadin for now.     #Anemia  -Hg 8.2   -no melena or hematochezia  -iron panel: ACD and iron low sats  -IV Iron sucrose x 1 (10/29/22) and (10/31/22)
off ac now;    10/27: hr controlled:  off ac for now:    10/28: tren d INR    10/28 INR is 1.57 today  : defer to primary team    10/30: per cards    1/31: restart ac: ?  11/1; per cards ; on full ac:    11/2: cont ac per primary team
-hold coumadin for now, pt likely would benefit from thoracentesis  -once off bipap metoprolol BID, can give metoprolol 5 mg iv if HR >110 (though currently paced)    #Anemia  -Hg 8.2   -no melena or hematochezia  -iron panel: ACD and iron low sats  - will consider IV Iron once stable
- on Lovenox SQ BID   - holding coumadin for now.     #Anemia  -Hg 8.2   -no melena or hematochezia  -iron panel: ACD and iron low sats  -IV Iron sucrose x 1 (10/29/22) and (10/31/22)
- on Lovenox SQ BID   - holding coumadin for now on comfort care.     #Anemia  -Hg 8.2   -no melena or hematochezia  -iron panel: ACD and iron low sats  -IV Iron sucrose x 1 (10/29/22) and (10/31/22)
-hold coumadin for now, pt likely would benefit from thoracentesis  - Lovenox SQ BID once INR < 2    #Anemia  -Hg 8.2   -no melena or hematochezia  -iron panel: ACD and iron low sats  -IV Iron sucrose x 1 (10/29/22)
on 5 L of oxygen :  cont BD:   cont Pulmicort;
- on Lovenox SQ BID   - holding coumadin for now.     #Anemia  -Hg 8.2   -no melena or hematochezia  -iron panel: ACD and iron low sats  -IV Iron sucrose x 1 (10/29/22) and (10/31/22)
off ac now;    10/27: hr controlled:  off ac for now:
- on Lovenox SQ BID   - holding coumadin for now on comfort care.     #Anemia  -Hg 8.2   -no melena or hematochezia  -iron panel: ACD and iron low sats  -IV Iron sucrose x 1 (10/29/22) and (10/31/22)
off ac now;
off ac now;    10/27: hr controlled:  off ac for now:    10/28: tamie botello INR    10/28 INR is 1.57 today  : defer to primary team    10/30: per cards    1/31: restart ac: ?
- on Lovenox SQ BID   - holding coumadin for now.     #Anemia  -Hg 8.2   -no melena or hematochezia  -iron panel: ACD and iron low sats  -IV Iron sucrose x 1 (10/29/22) and (10/31/22)
- on Lovenox SQ BID   - holding coumadin for now.     #Anemia  -Hg 8.2   -no melena or hematochezia  -iron panel: ACD and iron low sats  -IV Iron sucrose x 1 (10/29/22) and (10/31/22)
- Lovenox SQ BID once INR < 2  - holding coumadin for now    #Anemia  -Hg 8.2   -no melena or hematochezia  -iron panel: ACD and iron low sats  -IV Iron sucrose x 1 (10/29/22) and (10/31/22)
- on Lovenox SQ BID   - holding coumadin for now on comfort care.     #Anemia  -Hg 8.2   -no melena or hematochezia  -iron panel: ACD and iron low sats  -IV Iron sucrose x 1 (10/29/22) and (10/31/22)
- on Lovenox SQ BID   - holding coumadin for now.     #Anemia  -Hg 8.2   -no melena or hematochezia  -iron panel: ACD and iron low sats  -IV Iron sucrose x 1 (10/29/22) and (10/31/22)
10/31: seems stable:  s/p thoracentesis : ? transudative effusion: check serum LDH and serum proteins:    11/1: CONT bd: : ADD STEORIDS; to help in expectorating phlegm    11/2: cont chest vest:  steroids and BD:    11/3: cont steroids for today too ; cont BD: decrease tomorrow the dose of steroids    11/04: seems OK : no obvious resp distress: on 100% NRB : wean down oxygen:    11/6: no sob: no wheezing:
cont lovenox
off ac now;    10/27: hr controlled:  off ac for now:    10/28: tren d INR    10/28 INR is 1.57 today  : defer to primary team    10/30: per cards    1/31: restart ac: ?  11/1; per cards ; on full ac:    11/2: cont ac per primary team    11/3; cont curretn care:
off ac now;    10/27: hr controlled:  off ac for now:    10/28: tamie botello INR
- on Lovenox SQ BID   - holding coumadin for now.     #Anemia  -Hg 8.2   -no melena or hematochezia  -iron panel: ACD and iron low sats  -IV Iron sucrose x 1 (10/29/22) and (10/31/22)
-hold coumadin for now, pt likely would benefit from thoracentesis  -once off bipap metoprolol BID, can give metoprolol 5 mg iv if HR >110 (though currently paced)    #Anemia  -Hg 8.2   -no melena or hematochezia  -iron panel: ACD and iron low sats  - will consider IV Iron once stable
PPSV 10%  Please assist with ADLs  reposition routinely
cont lovenox  11/5: on ac
- on Lovenox SQ BID   - holding coumadin for now.     #Anemia  -Hg 8.2   -no melena or hematochezia  -iron panel: ACD and iron low sats  -IV Iron sucrose x 1 (10/29/22) and (10/31/22)
off ac now;    10/27: hr controlled:  off ac for now:    10/28: tamie botello INR    10/28 INR is 1.57 today  : defer to primary team
off ac now;    10/27: hr controlled:  off ac for now:    10/28: tamie botello INR    10/28 INR is 1.57 today  : defer to primary team    10/30: per brice
off ac now;    10/27: hr controlled:  off ac for now:    10/28: tren d INR    10/28 INR is 1.57 today  : defer to primary team    10/30: per cards    1/31: restart ac: ?  11/1; per cards ; on full ac:

## 2022-11-13 NOTE — CHART NOTE - NSCHARTNOTESELECT_GEN_ALL_CORE
Event Note
GoC/Event Note
Hypoglycemia/Event Note
RRT/Event Note
thoracic surgery/Event Note
Event Note
IVF/Event Note
Nutrition Services
Thoracic Surgery/Event Note
Thoracic Surgery/Event Note
Thoracic surgery
hypernatremia/Event Note

## 2022-11-13 NOTE — DISCHARGE NOTE FOR THE EXPIRED PATIENT - HOSPITAL COURSE
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 Cardiology and Pulmnology consulted s/p thoracentesis. Course c/b hypernatremia and left foot wounds.   RRT 11/1- pt removed HFNC, desatted, hypoxic, cyanotic, unresponsive, improved, back to baseline, s/p IV Lasix      Problem/Plan - 1:  ·  Problem: Acute respiratory failure with hypoxia.   ·  Plan: - 2/2 large pleural effusion and pulm vasc congestion  - troponin elevation likely 2/2 CHF exacerbation (stable x 3 sets). card following   - TTE: Severe global left ventricular systolic dysfunction. EF 28%  - tele monitoring: no events. paced rhythm.   - c/w Lasix 40 mg iv BID, has chronic Hudson for strict ins and outs.  - pulm consult: Dr. Felton.   - CT surgery on the case for thoracentesis (INR 2.5, procedure canceled)  - vit K PO x 1 for elevated INR in anticipation for thoracentesis, which was done 10/31/22: s/p 1500 cc fluid removed.   - no plans for further imaging or invasive procedures  - poor prognosis, comfort care only  - Advance directives discussed with the daughter Willow HCP requests palliative eval. Consulted.   - The daughter Willow asking for DNR/DNI. wishes that he will be going home with home hospice once he is a little better optimized. d/w palliative team Delmy.  - 88-90% on 40% Hi-Jaylon NCO2  - 99% on 100% NRB  - Home hospice unable to accommodate NRB  - Low-dose IV MSO4 as need for relieving work of breathing is appropriate  - weaning down O2 as tolerates, currently on 6L NC. if remain stable, likely home with home hospice.     Problem/Plan - 2:  ·  Problem: COPD, severe.   ·  Plan: - no wheezing  - c/w duonebs Q6 prn wheezing  - per ED no wheezing earlier either, but crackles  - diuretics as above.     Problem/Plan - 3:  ·  Problem: Chronic atrial fibrillation.   ·  Plan: - on Lovenox SQ BID   - holding coumadin for now on comfort care.     #Anemia  -Hg 8.2   -no melena or hematochezia  -iron panel: ACD and iron low sats  -IV Iron sucrose x 1 (10/29/22) and (10/31/22).     Problem/Plan - 4:  ·  Problem: Pleural effusion.   ·  Plan: -large pleural effusion left sided, has had effusions prior w/ thoracentesis 2/2 CHF  -pulm eval, s/p thoracentesis as above  -procal low at 0.26 on admission  -comfort care measures.     Problem/Plan - 5:  ·  Problem: Hypothyroid.   ·  Plan: -c/w synthroid while pt is NPO can do 18.75 mcg IV  -TSH 1.66.     Problem/Plan - 6:  ·  Problem: H/O: CVA (cerebrovascular accident).   ·  Plan: -c/w asp/lipitor.     Problem/Plan - 7:  ·  Problem: BPH (benign prostatic hyperplasia).   ·  Plan: -has chronic Hudson  -can c/w Flomax and finasteride.     Problem/Plan - 8:  ·  Problem: Nutrition, metabolism, and development symptoms.   ·  Plan: off bipap --> weaned to nasal canula.   seen by speech, pureed diet with mod thick liquid recommended    # Hypernatremia  Na improving, s/p D5W per renal.   diuretic also on hold  renal f/u appreciated.  comfort care measure at this point, so no further labwork.         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 Cardiology and Pulmnology consulted s/p thoracentesis. Course c/b hypernatremia and left foot wounds.   RRT - pt removed HFNC, desatted, hypoxic, cyanotic, unresponsive, improved, back to baseline, s/p IV Lasix      Problem/Plan - 1:  ·  Problem: Acute respiratory failure with hypoxia.   ·  Plan: - 2/2 large pleural effusion and pulm vasc congestion  - troponin elevation likely 2/2 CHF exacerbation (stable x 3 sets). card following   - TTE: Severe global left ventricular systolic dysfunction. EF 28%  - tele monitoring: no events. paced rhythm.   - c/w Lasix 40 mg iv BID, has chronic Hudson for strict ins and outs.  - pulm consult: Dr. Felton.   - CT surgery on the case for thoracentesis (INR 2.5, procedure canceled)  - vit K PO x 1 for elevated INR in anticipation for thoracentesis, which was done 10/31/22: s/p 1500 cc fluid removed.   - no plans for further imaging or invasive procedures  - poor prognosis, comfort care only  - Advance directives discussed with the daughter Willow HCP requests palliative eval. Consulted.   - The daughter Willow asking for DNR/DNI. wishes that he will be going home with home hospice once he is a little better optimized. d/w palliative team Delmy.  - 88-90% on 40% Hi-Jaylon NCO2  - 99% on 100% NRB  - Home hospice unable to accommodate NRB  - Low-dose IV MSO4 as need for relieving work of breathing is appropriate  - weaning down O2 as tolerates, currently on 6L NC. if remain stable, likely home with home hospice.     Problem/Plan - 2:  ·  Problem: COPD, severe.   ·  Plan: - no wheezing  - c/w duonebs Q6 prn wheezing  - per ED no wheezing earlier either, but crackles  - diuretics as above.     Problem/Plan - 3:  ·  Problem: Chronic atrial fibrillation.   ·  Plan: - on Lovenox SQ BID   - holding coumadin for now on comfort care.     #Anemia  -Hg 8.2   -no melena or hematochezia  -iron panel: ACD and iron low sats  -IV Iron sucrose x 1 (10/29/22) and (10/31/22).     Problem/Plan - 4:  ·  Problem: Pleural effusion.   ·  Plan: -large pleural effusion left sided, has had effusions prior w/ thoracentesis  CHF  -pulm eval, s/p thoracentesis as above  -procal low at 0.26 on admission  -comfort care measures.     Problem/Plan - 5:  ·  Problem: Hypothyroid.   ·  Plan: -c/w synthroid while pt is NPO can do 18.75 mcg IV  -TSH 1.66.     Problem/Plan - 6:  ·  Problem: H/O: CVA (cerebrovascular accident).   ·  Plan: -c/w asp/lipitor.     Problem/Plan - 7:  ·  Problem: BPH (benign prostatic hyperplasia).   ·  Plan: -has chronic Hudson  -can c/w Flomax and finasteride.     Problem/Plan - 8:  ·  Problem: Nutrition, metabolism, and development symptoms.   ·  Plan: off bipap --> weaned to nasal canula.   seen by speech, pureed diet with mod thick liquid recommended    # Hypernatremia  Na improving, s/p D5W per renal.   diuretic also on hold  renal f/u appreciated.  comfort care measure at this point, so no further labwork.    Nurse notified PA  pt. on 15L O2 steadily desaturation 80%-->70%, /63   (PPM)  Daughter at bedside confirming her father is comfort care and was calling family in to be at bedside.  Despite giving Lasix 40mg IV X1, suctioning patient and repositioning patient pt. steadily declined 15L O2 Sat 50-->40 BP unreadable, Nurse gave Morphine 2mg IV for comfort care.  4:20AM patient  no O2 Sat , no  BP and no carotid pulse.  Family all at bedside and thankful for the care patient received.  Dr. Heller notified by phone.

## 2022-11-13 NOTE — CHART NOTE - NSCHARTNOTEFT_GEN_A_CORE
PA Event Note 22 3:30AM    Nurse notified PA  pt. on 15L O2 steadily desaturation 80%-->70%, /63   (PPM)  Daughter at bedside confirming her father is comfort care and was calling family in to be at bedside.  Despite giving Lasix 40mg IV X1, suctioning patient and repositioning patient pt. steadily declined 15L O2 Sat 50-->40 BP unreadable, Nurse gave Morphine 2mg IV for comfort care.  4:20AM patient  no O2 Sat , no  BP and no carotid pulse.  Family all at bedside and thankful for the care patient received.  Dr. Heller notified by phone.      Miranda RICE  Pager [phone]

## 2022-12-09 ENCOUNTER — APPOINTMENT (OUTPATIENT)
Dept: ELECTROPHYSIOLOGY | Facility: CLINIC | Age: 87
End: 2022-12-09

## 2023-01-16 NOTE — ED ADULT TRIAGE NOTE - SPO2 (%)
90 Zygomaticofacial Flap Text: Given the location of the defect, shape of the defect and the proximity to free margins a zygomaticofacial flap was deemed most appropriate for repair.  Using a sterile surgical marker, the appropriate flap was drawn incorporating the defect and placing the expected incisions within the relaxed skin tension lines where possible. The area thus outlined was incised deep to adipose tissue with a #15 scalpel blade with preservation of a vascular pedicle.  The skin margins were undermined to an appropriate distance in all directions utilizing iris scissors.  The flap was then placed into the defect and anchored with interrupted buried subcutaneous sutures.

## 2023-02-17 NOTE — ED ADULT NURSE NOTE - OBJECTIVE STATEMENT
Received 87yr old female patient A&Ox3, brought from [facility] for becoming more lethargic today. As per daughter, hgb is 8 and WBC is increasing. Pt on 3L NC. Made comfortable to room 23. No acute distress noted or verbalized. PMH: COPD, DM, pacemaker Suturegard Body: The suture ends were repeatedly re-tightened and re-clamped to achieve the desired tissue expansion.

## 2023-03-07 NOTE — BRIEF OPERATIVE NOTE - ASSISTANT(S)
Nkechi
Wolf
25 y.o.  JAYCEE 3/18/2023 @ 38.3 weeks presents with c/o LOF since 1030a and vaginal bleeding. Pt states mild "menstrual-like" cramping. Pt states +FM. Hx low lying placenta, resolved 2023 ATU ultrasound.    pt states hx +covid 19 in . Pt is vaccinated x 3 with pfizer covid 19 vaccine.     allergies:  NKDA    medications:  prenatal vitamins  albuterol prn  symbicort    med hx:  mild intermittent asthma - no hospitalizations, or intubations, albuterol prn, symbicort  alpha thalassemia trait - s/p heme onc consult 2022    surg hx:  denies    psychosocial hx:  denies anxiety, depression    ETOH/tobacco/illicit drug use:  denies

## 2023-03-21 ENCOUNTER — APPOINTMENT (OUTPATIENT)
Dept: ELECTROPHYSIOLOGY | Facility: CLINIC | Age: 88
End: 2023-03-21

## 2023-05-03 NOTE — PROGRESS NOTE ADULT - ASSESSMENT
86 year old gentleman with a PMH DM, HTN, HLD who has been followed for the past 6 months for foot wounds and leg pain.      EKG -  A sense V paced PVC's  Echo - Mild LV dysfunction mild aortic stenosis    1) Post-op assessment   -pt has h/o moderate LV dysfunction as per echo 2017, no chest pains 2d echo now shows mild LV dysfunction  -12 lead EKG ok,  PPM interrogated  -s/p BKA     2) HTN  -controlled  -c/w metoprolol  -continue to monitor BP    3) Chronic systolic CHF   - hold metolazone   -c/w PO lasix 20mg BID  -monitor I&Os    4) ?Atrial fib   -c/w hep to coumadin bridge   -monitor INR     5) KARLOS  -improving  -continue to hold losartan and metolazone  -f/u renal sudden onset

## 2023-06-04 NOTE — PHYSICAL THERAPY INITIAL EVALUATION ADULT - PATIENT/FAMILY/SIGNIFICANT OTHER GOALS STATEMENT, PT EVAL
----- Message from Marivel Christiansen sent at 6/4/2023  6:28 PM CDT -----  Patient Name: Selene Escobar  Caller: Patricia Contreras (Daughter)  Call Back # : [phone]  Symptoms: [place], 86, F- patient's daughter is calling for the 6th time calling today.  Her mom is in a lot of pain. She has had 2 angioplasties that failed.  One in March the other in May. She is [facility] patient of Dr. Ethan Srinivasan.  Mom is excruciating pain.  Mom is 7-8 on the pain scale. Daughter is absolutely frustrated that a doctor is not responding to her. Mom has an appointment Thursday June 8th. Mom ran out of Tramadol. Need Medication to get her through the night    Is this for an Active episode for Home Health, Hospice or Palliative Care? NO    Are you currently at (or near) your place of residence? [place]    Use following scripting for patients waiting for a callback:   \"Nurse callback times vary based on call volumes; please be aware the return phone call may come from an unidentified or out of state phone number. If your symptoms worsen or become life threatening while waiting, you should seek immediate assistance by calling 911 or going to the ER for evaluation.\"     to get better

## 2023-06-04 NOTE — CONSULT NOTE ADULT - TIME-BASED
Patient is a 89y old  Female who presents with a chief complaint of SOB (03 Jun 2023 19:33)      HPI:  89 yo F, from home, with PMHx HTN, COPD, Afib, HFpEF (7/29/22 EF 55-60%, LVH, GIDD, Breast CA, and remote h/o LLE DVT p/w SOB and hypoxia. Patient is on oxygen Nasal canula at home (unsure how many liters) but has been requiring more than her baseline over the last few days. Patient denies any coughing or chest pain. Patient was discharged from [facility] in february 2023 for AHRF secondary to COPD exacerbation. Patient denies any fevers, chills, headaches, dizziness, chest pain, cough, abd pain, nv, diarrhea, constipation, hematuria.    In ED VS:   T 98.2, HR 85, /81, RR 18, Spo2 97% 3LNC   trop 64   EKG A fib   cxray heart enlargement and central CHF  (03 Jun 2023 19:33)  ICU consulted for assessment of respiratory status.       Allergies    losartan (Angioedema)    Intolerances    Chicken (Stomach Upset)      MEDICATIONS  (STANDING):  anastrozole 1 milliGRAM(s) Oral daily  apixaban 5 milliGRAM(s) Oral two times a day  budesonide 160 MICROgram(s)/formoterol 4.5 MICROgram(s) Inhaler 2 Puff(s) Inhalation two times a day  carvedilol 6.25 milliGRAM(s) Oral every 12 hours  furosemide   Injectable 40 milliGRAM(s) IV Push daily  montelukast 10 milliGRAM(s) Oral daily  simvastatin 20 milliGRAM(s) Oral at bedtime    MEDICATIONS  (PRN):  albuterol    90 MICROgram(s) HFA Inhaler 1 Puff(s) Inhalation every 6 hours PRN Shortness of Breath and/or Wheezing      Daily Height in cm: 167.64 (03 Jun 2023 13:43)    Daily     Drug Dosing Weight  Height (cm): 167.6 (03 Jun 2023 13:43)  Weight (kg): 119.7 (03 Jun 2023 13:43)  BMI (kg/m2): 42.6 (03 Jun 2023 13:43)  BSA (m2): 2.25 (03 Jun 2023 13:43)    PAST MEDICAL & SURGICAL HISTORY:  Arthritis      Legally blind      Pre-diabetes      Breast cancer  right, no chemo or radiation      COPD exacerbation      Atrial fibrillation      HF (heart failure)  HFpEF 7/29      HTN (hypertension)      Deep vein thrombosis (DVT)  Left Lower Extremity      H/O CHF      No significant past surgical history          FAMILY HISTORY:  FH: HTN (hypertension)        SOCIAL HISTORY: non smoker    ADVANCE DIRECTIVES:    REVIEW OF SYSTEMS:    CONSTITUTIONAL: No fever, weight loss, or fatigue  EYES: No eye pain, visual disturbances, or discharge  ENMT:  No difficulty hearing, tinnitus, vertigo; No sinus or throat pain  NECK: No pain or stiffness  BREASTS: No pain, masses, or nipple discharge  RESPIRATORY: No cough, wheezing, chills or hemoptysis; + shortness of breath  CARDIOVASCULAR: No chest pain, palpitations, dizziness, or leg swelling  GASTROINTESTINAL: No abdominal or epigastric pain. No nausea, vomiting, or hematemesis; No diarrhea or constipation. No melena or hematochezia.  GENITOURINARY: No dysuria, frequency, hematuria, or incontinence  NEUROLOGICAL: No headaches, memory loss, loss of strength, numbness, or tremors  SKIN: No itching, burning, rashes, or lesions   LYMPH NODES: No enlarged glands  ENDOCRINE: No heat or cold intolerance; No hair loss  MUSCULOSKELETAL: No joint pain or swelling; No muscle, back, or extremity pain  PSYCHIATRIC: No depression, anxiety, mood swings, or difficulty sleeping  HEME/LYMPH: No easy bruising, or bleeding gums  ALLERGY AND IMMUNOLOGIC: No hives or eczema          ICU Vital Signs Last 24 Hrs  T(C): 36.6 (03 Jun 2023 19:38), Max: 36.8 (03 Jun 2023 13:43)  T(F): 97.9 (03 Jun 2023 19:38), Max: 98.2 (03 Jun 2023 13:43)  HR: 63 (03 Jun 2023 19:38) (63 - 85)  BP: 103/63 (03 Jun 2023 19:38) (102/65 - 111/81)  BP(mean): --  ABP: --  ABP(mean): --  RR: 20 (03 Jun 2023 19:38) (18 - 20)  SpO2: 95% (03 Jun 2023 19:38) (95% - 100%)    O2 Parameters below as of 03 Jun 2023 19:38  Patient On (Oxygen Delivery Method): nasal cannula  O2 Flow (L/min): 1              I&O's Detail      PHYSICAL EXAM:    GENERAL: NAD,on NC   HEAD:  Atraumatic, Normocephalic  EYES: EOMI, PERRLA, conjunctiva and sclera clear  ENMT: No tonsillar erythema, exudates, or enlargement; Moist mucous membranes, Good dentition, No lesions  NECK: Supple, No JVD, Normal thyroid  NERVOUS SYSTEM:  Alert & Oriented X3, Good concentration; Motor Strength 5/5 B/L upper and lower extremities; DTRs 2+ intact and symmetric  CHEST/LUNG: no wheezes , no crackles  HEART: Regular rate and rhythm; No murmurs, rubs, or gallops  ABDOMEN: Soft, Nontender, Nondistended; Bowel sounds present  EXTREMITIES:  2+ Peripheral Pulses, +1 bilateral LE oedema  LYMPH: No lymphadenopathy noted  SKIN: No rashes or lesions    LABS:  CBC Full  -  ( 03 Jun 2023 14:40 )  WBC Count : 7.13 K/uL  RBC Count : 3.63 M/uL  Hemoglobin : 11.4 g/dL  Hematocrit : 37.4 %  Platelet Count - Automated : 250 K/uL  Mean Cell Volume : 103.0 fl  Mean Cell Hemoglobin : 31.4 pg  Mean Cell Hemoglobin Concentration : 30.5 gm/dL  Auto Neutrophil # : 4.29 K/uL  Auto Lymphocyte # : 1.28 K/uL  Auto Monocyte # : 0.63 K/uL  Auto Eosinophil # : 0.88 K/uL  Auto Basophil # : 0.04 K/uL  Auto Neutrophil % : 60.2 %  Auto Lymphocyte % : 18.0 %  Auto Monocyte % : 8.8 %  Auto Eosinophil % : 12.3 %  Auto Basophil % : 0.6 %    06-03    140  |  102  |  11  ----------------------------<  149<H>  4.3   |  38<H>  |  0.76    Ca    8.5      03 Jun 2023 14:40    TPro  6.6  /  Alb  2.8<L>  /  TBili  0.8  /  DBili  x   /  AST  21  /  ALT  18  /  AlkPhos  102  06-03    CAPILLARY BLOOD GLUCOSE      POCT Blood Glucose.: 220 mg/dL (03 Jun 2023 13:41)                   CHIEF COMPLAINT:Patient is a 89y old  Female who presents with a chief complaint of SOB.      HPI:  89 yo F, from home, with PMHx HTN, COPD, Afib, HFpEF (7/29/22 EF 55-60%, LVH, GIDD, Breast CA, and remote h/o LLE DVT p/w SOB and hypoxia. Patient is on oxygen Nasal canula at home (unsure how many liters) but has been requiring more than her baseline over the last few days. Patient denies any coughing or chest pain. Patient was discharged from [facility] in february 2023 for AHRF secondary to COPD exacerbation. Patient denies any fevers, chills, headaches, dizziness, chest pain, cough, abd pain, nv, diarrhea, constipation, hematuria.    In ED VS:   T 98.2, HR 85, /81, RR 18, Spo2 97% 3LNC   trop 64   EKG A fib   cxray heart enlargement and central CHF  (03 Jun 2023 19:33)      PAST MEDICAL & SURGICAL HISTORY:  Arthritis      Legally blind      Pre-diabetes      Breast cancer  right, no chemo or radiation      COPD exacerbation      Atrial fibrillation      HF (heart failure)  HFpEF 7/29      HTN (hypertension)      Deep vein thrombosis (DVT)  Left Lower Extremity      H/O CHF      No significant past surgical history          MEDICATIONS  (STANDING):  anastrozole 1 milliGRAM(s) Oral daily  apixaban 5 milliGRAM(s) Oral two times a day  budesonide 160 MICROgram(s)/formoterol 4.5 MICROgram(s) Inhaler 2 Puff(s) Inhalation two times a day  carvedilol 6.25 milliGRAM(s) Oral every 12 hours  furosemide   Injectable 40 milliGRAM(s) IV Push daily  montelukast 10 milliGRAM(s) Oral daily  simvastatin 20 milliGRAM(s) Oral at bedtime    MEDICATIONS  (PRN):  albuterol    90 MICROgram(s) HFA Inhaler 1 Puff(s) Inhalation every 6 hours PRN Shortness of Breath and/or Wheezing      FAMILY HISTORY:  FH: HTN (hypertension)        SOCIAL HISTORY:    [x ] Non-smoker    [ x] Alcohol-denies    Allergies    losartan (Angioedema)    Intolerances    Chicken (Stomach Upset)  	    REVIEW OF SYSTEMS:  CONSTITUTIONAL: No fever, weight loss, or fatigue  EYES: No eye pain, visual disturbances, or discharge  ENT:  No difficulty hearing, tinnitus, vertigo; No sinus or throat pain  NECK: No pain or stiffness  RESPIRATORY: No cough, wheezing, chills or hemoptysis; + Shortness of Breath  CARDIOVASCULAR: No chest pain, palpitations, passing out, dizziness, or leg swelling  GASTROINTESTINAL: No abdominal or epigastric pain. No nausea, vomiting, or hematemesis; No diarrhea or constipation. No melena or hematochezia.  GENITOURINARY: No dysuria, frequency, hematuria, or incontinence  NEUROLOGICAL: No headaches, memory loss, loss of strength, numbness, or tremors  SKIN: No itching, burning, rashes, or lesions   LYMPH Nodes: No enlarged glands  ENDOCRINE: No heat or cold intolerance; No hair loss  MUSCULOSKELETAL: No joint pain or swelling; No muscle, back, or extremity pain  PSYCHIATRIC: No depression, anxiety, mood swings, or difficulty sleeping  HEME/LYMPH: No easy bruising, or bleeding gums  ALLERGY AND IMMUNOLOGIC: No hives or eczema	        PHYSICAL EXAM:  T(C): 37 (06-04-23 @ 07:11), Max: 37.6 (06-04-23 @ 04:56)  HR: 61 (06-04-23 @ 07:11) (54 - 85)  BP: 114/62 (06-04-23 @ 07:11) (102/65 - 139/85)  RR: 19 (06-04-23 @ 07:11) (18 - 20)  SpO2: 99% (06-04-23 @ 07:11) (95% - 100%)  Wt(kg): --  I&O's Summary    03 Jun 2023 07:01  -  04 Jun 2023 07:00  --------------------------------------------------------  IN: 0 mL / OUT: 800 mL / NET: -800 mL        Appearance: Normal	  HEENT:   Normal oral mucosa, PERRL, EOMI	  Lymphatic: No lymphadenopathy  Cardiovascular: Normal S1 S2, No JVD, No murmurs, No edema  Respiratory: Dec BS at bases  Psychiatry: A & O x 3, Mood & affect appropriate  Gastrointestinal:  Soft, Non-tender, + BS	  Skin: No rashes, No ecchymoses, No cyanosis	  Neurologic: Non-focal  Extremities: Normal range of motion, No clubbing, cyanosis or edema  Vascular: Peripheral pulses palpable 2+ bilaterally     ECG:  afib,q septal leads	    LABS:	 	    Troponin I, High Sensitivity Result: 62.8 ng/L (06-03-23 @ 20:45)  Troponin I, High Sensitivity Result: 64.4 ng/L (06-03-23 @ 14:40)                          12.6   6.93  )-----------( 253      ( 04 Jun 2023 06:22 )             41.4     06-04    138  |  102  |  7   ----------------------------<  128<H>  4.3   |  34<H>  |  0.65    Ca    9.0      04 Jun 2023 06:22  Phos  3.7     06-04  Mg     2.0     06-04    TPro  7.1  /  Alb  3.2<L>  /  TBili  0.8  /  DBili  x   /  AST  11  /  ALT  19  /  AlkPhos  114  06-04  < from: Transthoracic Echocardiogram (07.29.22 @ 08:30) >  OBSERVATIONS:  Mitral Valve: Normal mitral valve.  Aortic Root: Aortic Root: 4.0 cm.    Aortic Valve: Calcified trileaflet aortic valve with normal  opening.  Left Atrium: Normal left atrium.  LA volume index = 32  cc/m2.  Left Ventricle: Normal Left Ventricular Systolic Function,  (EF = 55 to 60%) Mild concentric left ventricular  hypertrophy. Grade I diastolic dysfunction (Impaired  relaxation, mild).  Right Heart: Normal right atrium. Normal right ventricular  size and function. Normal tricuspid valve. There is trace  pulmonic regurgitation.  Pericardium/PleuraNormal pericardium with no pericardial  effusion.  Hemodynamic: RV systolic pressure is mildly increased at  39 mm Hg.    < end of copied text >  < from: Xray Chest 1 View-PORTABLE IMMEDIATE (06.03.23 @ 14:58) >  ACC: [id number] EXAM:  XR CHEST PORTABLE IMMED 1V   ORDERED BY: ESTHER WAYNE     PROCEDURE DATE:  06/03/2023          INTERPRETATION:  AP erect chest on Rose 3, 2023 2:40 PM. Patient is short   of breath.    Heart enlargement again noted.    Thereis persistent central CHF.    Chest is similar to January 30 this year.    IMPRESSION: Heart enlargement and central CHF again noted.    < end of copied text >     70

## 2023-06-28 NOTE — DISCHARGE NOTE PROVIDER - CARE PROVIDER_API CALL
Call placed to mother to inform that writer spoke with RD, and she is going to try to finish the documentation/notes on patient today. Writer or RD will give mom a call back when they are able to be sent over to [employer].    Francesco Savage)  Family Medicine  [address] [place] [address]  Phone: [phone]  Fax: [phone]  Follow Up Time: 1 week

## 2023-07-20 NOTE — PROGRESS NOTE ADULT - SUBJECTIVE AND OBJECTIVE BOX
Ritesh Bell MD  Interventional Cardiology / Advance Heart Failure and Cardiac Transplant Specialist  [place] Office : [address] [place] [address] [place]. [address]  Tel:   [place] Office : [address] [facility] [place] [address]  Tel: [phone]      Subjective/Overnight events: Pt is lying in bed comfortable not in distress  	  MEDICATIONS:  heparin  Infusion 1200 Unit(s)/Hr IV Continuous <Continuous>  metoprolol succinate ER 25 milliGRAM(s) Oral daily  tamsulosin 0.8 milliGRAM(s) Oral at bedtime    piperacillin/tazobactam IVPB.. 3.375 Gram(s) IV Intermittent every 8 hours    albuterol/ipratropium for Nebulization 3 milliLiter(s) Nebulizer every 6 hours  buDESOnide    Inhalation Suspension 0.5 milliGRAM(s) Inhalation every 12 hours  guaiFENesin ER 1200 milliGRAM(s) Oral every 12 hours  montelukast 10 milliGRAM(s) Oral daily    acetaminophen     Tablet .. 975 milliGRAM(s) Oral every 8 hours  gabapentin 300 milliGRAM(s) Oral every 8 hours  oxyCODONE    IR 10 milliGRAM(s) Oral every 4 hours PRN  oxyCODONE    IR 5 milliGRAM(s) Oral every 4 hours PRN    pantoprazole    Tablet 40 milliGRAM(s) Oral before breakfast  polyethylene glycol 3350 17 Gram(s) Oral daily  senna 2 Tablet(s) Oral at bedtime    atorvastatin 40 milliGRAM(s) Oral at bedtime  finasteride 5 milliGRAM(s) Oral daily  insulin regular Infusion 3 Unit(s)/Hr IV Continuous <Continuous>  levothyroxine 25 MICROGram(s) Oral daily  predniSONE   Tablet 50 milliGRAM(s) Oral daily    chlorhexidine 2% Cloths 1 Application(s) Topical <User Schedule>  multivitamin/minerals 1 Tablet(s) Oral daily      PAST MEDICAL/SURGICAL HISTORY  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    Deep Vein Thrombosis (DVT)  17 yeras ago on Coumadin    CHF (congestive heart failure)  last exacerbation in 1/2017    Enlarged prostate    GERD (gastroesophageal reflux disease)    Hernia  umblical    Calculus of bile duct without cholangitis or cholecystitis without obstruction    Atrial fibrillation    S/P Hernia Repair    S/P cataract surgery, unspecified laterality    S/P ERCP  3/2017        SOCIAL HISTORY: Substance Use (street drugs): ( x ) never used  (  ) other:    FAMILY HISTORY:          PHYSICAL EXAM:  T(C): 36.7 (04-14-22 @ 08:00), Max: 36.7 (04-13-22 @ 16:00)  HR: 69 (04-14-22 @ 10:00) (69 - 99)  BP: 125/58 (04-14-22 @ 10:00) (125/58 - 170/77)  RR: 22 (04-14-22 @ 10:00) (10 - 30)  SpO2: 93% (04-14-22 @ 10:00) (88% - 100%)  Wt(kg): --  I&O's Summary    13 Apr 2022 07:01  -  14 Apr 2022 07:00  --------------------------------------------------------  IN: 1455 mL / OUT: 2565 mL / NET: -1110 mL    14 Apr 2022 07:01  -  14 Apr 2022 10:30  --------------------------------------------------------  IN: 173 mL / OUT: 275 mL / NET: -102 mL          EYES:   PERRLA   ENMT:   Moist mucous membranes, Good dentition, No lesions  Cardiovascular: Normal S1 S2, No JVD, No murmurs, No edema  Respiratory: b/l rhonchi   Gastrointestinal:  Soft, Non-tender, + BS	  Extremities: s/p BKA. +1 LLE edema                                7.8    21.40 )-----------( 281      ( 14 Apr 2022 03:55 )             25.6     04-14    141  |  106  |  99<H>  ----------------------------<  326<H>  5.2   |  21<L>  |  1.75<H>    Ca    9.0      14 Apr 2022 03:55  Phos  4.6     04-14  Mg     2.6     04-14      proBNP:   Lipid Profile:   HgA1c:   TSH:     Consultant(s) Notes Reviewed:  [x ] YES  [ ] NO    Care Discussed with Consultants/Other Providers [ x] YES  [ ] NO    Imaging Personally Reviewed independently:  [x] YES  [ ] NO    All labs, radiologic studies, vitals, orders and medications list reviewed. Patient is seen and examined at bedside. Case discussed with medical team.         Her/She

## 2023-08-04 NOTE — PHYSICAL THERAPY INITIAL EVALUATION ADULT - CRITERIA FOR SKILLED THERAPEUTIC INTERVENTIONS
impairments found/functional limitations in following categories This patient was evaluated for sepsis.  At this time, a diagnosis of sepsis is not supported by the overall clinical picture.

## 2023-08-18 NOTE — ED ADULT TRIAGE NOTE - MEANS OF ARRIVAL
Outpatient Instructions for Monitored Anesthesia Care (MAC)    1. You will be released from the hospital in the care of a responsible adult who should remain with you for at least 6 hours.    2. You are at an increased risk for falls following anesthesia. Use care when changing from a lying to a sitting position. Use your assistive devices ( example: cane, walker or family member).    3. You must NOT drive a car, climb high places such as a ladder, or operate equipment such as electric knives,stoves etc...for at least 12 hours. If you are dizzy for longer than 24 hours, notify your doctor.    4. DO NOT drink any alcoholic beverages for at least 24 hours. Anesthesia may impair your judgement.    5. If you smoke, do not smoke alone due to increased risk of burns/fires.    6. DO NOT undertake any legally binding commitment for at least 24 hours. Anesthesia may impair your judgement.   
stretcher

## 2023-09-09 NOTE — PROGRESS NOTE ADULT - PROBLEM SELECTOR PLAN 1
Physical Therapy    Alexandra Bob  [id number]  [id number]      PT orders received and chart reviewed; pt was transferred to ICU after having code violet; pt now in 5 point restraints and not appropriate for therapy this date; will continue to follow  Electronically signed by GABRIELA JONES PT on 9/9/2023 at 8:52 AM -2/2 large pleural effusion and pulm vasc congestion  -troponin elevation likely 2/2 CHF exacerbation (stable x 3 sets). card following   -TTE: Severe global left ventricular systolic dysfunction. EF 28%  -tele monitoring  -Lasix 40 mg iv BID, has chronic hudson for strict ins and outs.  -bipap 14/7 w/ fio2 60%  -pulm consulted  - CT surgery eval for thoracentesis (INR 2.5, procedure canceled)  - vit K PO x 1 for elevated INR in anticipation for thoracentesis  - INR now below 2, Cr okay, so start Lovenox 90 mg SQ BID (weight based)

## 2023-12-14 NOTE — ED ADULT TRIAGE NOTE - DIRECT TO ROOM CARE INITIATED:
----- Message from Kit Rodarte MD sent at 12/14/2023 12:35 PM CST -----  Please contact patient to schedule eval in the next 1-2 months.  ----- Message -----  From: Lin Rosa MD  Sent: 12/14/2023  12:04 PM CST  To: Kit Rodarte MD    Would appreciate if you would take a look at him.  He is plagued with chronic drainage.  I've done everything I can in terms of meds and allergy injections.  I added budesonide rinses to see if that would help.  He had vidian nerve cryoablation with Tanmay but no relief.  Thanks a lot!     03-Jan-2020 14:21

## 2024-02-09 NOTE — PRE-ANESTHESIA EVALUATION ADULT - MALLAMPATI CLASS
Pts daughter called to get her set up for a hospital follow up. She was discharged from [facility] on 02/08/24 where she was seen for fluid retention. I was not pulling any appts with Gerard until 4/1. How would you like to proceed?    Class III - visualization of the soft palate and the base of the uvula

## 2024-02-26 NOTE — DIETITIAN INITIAL EVALUATION ADULT - PERTINENT LABORATORY DATA
Patient stable. VSS. AOX4. Safety measures ensured.call light within reach. Bed in low position   10-27    154<H>  |  110<H>  |  36<H>  ----------------------------<  252<H>  3.7   |  33<H>  |  1.06    Ca    8.4      27 Oct 2022 06:30  Phos  2.4     10-27  Mg     1.80     10-27    POCT Blood Glucose.: 275 mg/dL (10-27-22 @ 08:55)  A1C with Estimated Average Glucose Result: 9.1 % (09-15-22 @ 07:22)  A1C with Estimated Average Glucose Result: 6.8 % (04-02-22 @ 12:21)   How Severe Is Your Skin Lesion?: moderate Has Your Skin Lesion Been Treated?: not been treated Is This A New Presentation, Or A Follow-Up?: Skin Lesions

## 2025-07-03 NOTE — ED ADULT NURSE NOTE - NSFALLRSKINDICATORS_ED_ALL_ED
Received request via: Pharmacy    Was the patient seen in the last year in this department? Yes    Does the patient have an active prescription (recently filled or refills available) for medication(s) requested? No    Pharmacy Name:   [facility] [address] [place] [id number]  Phone: [phone] Fax: [phone]        Does the patient have nursing home Plus and need 100-day supply? (This applies to ALL medications) Patient does not have SCP   no

## 2025-07-03 NOTE — H&P ADULT. - PMH
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

## (undated) DEVICE — GLV 7.5 PROTEXIS (WHITE)

## (undated) DEVICE — ELCTR BOVIE PENCIL SMOKE EVACUATION

## (undated) DEVICE — MEDICATION LABELS W MARKER

## (undated) DEVICE — SUT POLYSORB 3-0 18" TIES UNDYED

## (undated) DEVICE — DRSG ACE BANDAGE 6"

## (undated) DEVICE — DRSG TEGADERM 6"X8"

## (undated) DEVICE — WARMING BLANKET UPPER ADULT

## (undated) DEVICE — LAP PAD 18 X 18"

## (undated) DEVICE — SYR ASEPTO

## (undated) DEVICE — SUT SOFSILK 2-0 30" V-20

## (undated) DEVICE — SOL IRR POUR NS 0.9% 500ML

## (undated) DEVICE — SPECIMEN CONTAINER 100ML

## (undated) DEVICE — PACK EXTREMITY

## (undated) DEVICE — SUCTION YANKAUER NO CONTROL VENT

## (undated) DEVICE — SUT SURGIPRO 2-0 18" C-15

## (undated) DEVICE — SUT POLYSORB 2-0 30" GS-21 UNDYED

## (undated) DEVICE — DRSG STERISTRIPS 0.5 X 4"

## (undated) DEVICE — SOL IRR POUR H2O 250ML

## (undated) DEVICE — STAPLER SKIN VISI-STAT 35 WIDE

## (undated) DEVICE — BLADE SCALPEL SAFETYLOCK #10

## (undated) DEVICE — SUT SOFSILK 3-0 30" V-20

## (undated) DEVICE — SUT POLYSORB 4-0 18" TIES UNDYED

## (undated) DEVICE — SUT MONOSOF 3-0 30" SC-2

## (undated) DEVICE — BLADE SCALPEL SAFETYLOCK #11

## (undated) DEVICE — DRSG KLING 6"

## (undated) DEVICE — GLV 6.5 PROTEXIS (WHITE)

## (undated) DEVICE — FOLEY HOLDER STATLOCK 2 WAY ADULT

## (undated) DEVICE — VENODYNE/SCD SLEEVE CALF LARGE

## (undated) DEVICE — DRAPE TOWEL BLUE 17" X 24"

## (undated) DEVICE — DRAPE 3/4 SHEET W REINFORCEMENT 56X77"

## (undated) DEVICE — DRAPE INSTRUMENT POUCH 6.75" X 11"

## (undated) DEVICE — DRSG COMBINE 5X9"

## (undated) DEVICE — MARKING PEN W RULER

## (undated) DEVICE — POSITIONER CARDIAC BUMP

## (undated) DEVICE — SUT POLYSORB 3-0 30" V-20 UNDYED

## (undated) DEVICE — GLV 7 PROTEXIS (WHITE)

## (undated) DEVICE — SAW BLADE MICROAIRE OSCILATING 25.4MM X 90MM X 1.27MM

## (undated) DEVICE — DRSG TELFA 3 X 8

## (undated) DEVICE — DRSG CURITY GAUZE SPONGE 4 X 4" 12-PLY

## (undated) DEVICE — DRAPE LIGHT HANDLE COVER (BLUE)

## (undated) DEVICE — DRSG XEROFORM 2 X 2"

## (undated) DEVICE — PREP CHLORAPREP HI-LITE ORANGE 26ML

## (undated) DEVICE — POSITIONER FOAM EGG CRATE ULNAR 2PCS (PINK)

## (undated) DEVICE — SUT POLYSORB 0 18" TIES UNDYED

## (undated) DEVICE — DRSG OPSITE 13.75 X 4"

## (undated) DEVICE — DRSG STOCKINETTE IMPERVIOUS XL

## (undated) DEVICE — GOWN TRIMAX LG

## (undated) DEVICE — FOLEY TRAY 16FR 5CC LTX UMETER CLOSED

## (undated) DEVICE — SUT MONOSOF 2-0 18" C-15

## (undated) DEVICE — SUT POLYSORB 2-0 18" TIES UNDYED

## (undated) DEVICE — GLV 8 PROTEXIS (WHITE)

## (undated) DEVICE — DRAPE ISOLATION BAG 20X20"

## (undated) DEVICE — DRAPE MAYO STAND 30"

## (undated) DEVICE — DRSG XEROFORM 1 X 8"

## (undated) DEVICE — DRAPE 1/2 SHEET 40X57"

## (undated) DEVICE — BLADE SCALPEL SAFETYLOCK #15

## (undated) DEVICE — DRAPE MAGNETIC INSTRUMENT MEDIUM

## (undated) DEVICE — STRYKER INTERPULSE HANDPIECE W IRR SUCTION TUBE